# Patient Record
Sex: FEMALE | Race: WHITE | NOT HISPANIC OR LATINO | Employment: UNEMPLOYED | URBAN - NONMETROPOLITAN AREA
[De-identification: names, ages, dates, MRNs, and addresses within clinical notes are randomized per-mention and may not be internally consistent; named-entity substitution may affect disease eponyms.]

---

## 2017-01-02 ENCOUNTER — APPOINTMENT (OUTPATIENT)
Dept: GENERAL RADIOLOGY | Facility: HOSPITAL | Age: 32
End: 2017-01-02

## 2017-01-02 ENCOUNTER — HOSPITAL ENCOUNTER (EMERGENCY)
Facility: HOSPITAL | Age: 32
Discharge: HOME | End: 2017-01-03
Attending: FAMILY MEDICINE | Admitting: FAMILY MEDICINE

## 2017-01-02 DIAGNOSIS — R10.9 FLANK PAIN, ACUTE: Primary | ICD-10-CM

## 2017-01-02 DIAGNOSIS — R07.9 CHEST PAIN IN ADULT: ICD-10-CM

## 2017-01-02 LAB
A-A DO2: 4.4 MMHG (ref 0–300)
ALBUMIN SERPL-MCNC: 4.2 G/DL (ref 3.5–5)
ALBUMIN/GLOB SERPL: 1.4 G/DL (ref 1.5–2.5)
ALP SERPL-CCNC: 57 U/L (ref 46–116)
ALT SERPL W P-5'-P-CCNC: 105 U/L (ref 10–36)
AMPHET+METHAMPHET UR QL: NEGATIVE
AMYLASE SERPL-CCNC: 32 U/L (ref 28–100)
ANION GAP SERPL CALCULATED.3IONS-SCNC: 6.4 MMOL/L (ref 3.6–11.2)
ARTERIAL PATENCY WRIST A: POSITIVE
AST SERPL-CCNC: 119 U/L (ref 10–30)
ATMOSPHERIC PRESS: 728 MMHG
B-HCG UR QL: NEGATIVE
BARBITURATES UR QL SCN: NEGATIVE
BASE EXCESS BLDA CALC-SCNC: 1.7 MMOL/L
BASOPHILS # BLD AUTO: 0.01 10*3/MM3 (ref 0–0.3)
BASOPHILS NFR BLD AUTO: 0.2 % (ref 0–2)
BDY SITE: ABNORMAL
BENZODIAZ UR QL SCN: NEGATIVE
BILIRUB SERPL-MCNC: 0.3 MG/DL (ref 0.2–1.8)
BILIRUB UR QL STRIP: NEGATIVE
BODY TEMPERATURE: 98.6 C
BUN BLD-MCNC: 11 MG/DL (ref 7–21)
BUN/CREAT SERPL: 15.3 (ref 7–25)
CALCIUM SPEC-SCNC: 9.6 MG/DL (ref 7.7–10)
CANNABINOIDS SERPL QL: NEGATIVE
CHLORIDE SERPL-SCNC: 103 MMOL/L (ref 99–112)
CLARITY UR: ABNORMAL
CO2 SERPL-SCNC: 29.6 MMOL/L (ref 24.3–31.9)
COCAINE UR QL: NEGATIVE
COHGB MFR BLD: 1.8 % (ref 0–5)
COLOR UR: ABNORMAL
CREAT BLD-MCNC: 0.72 MG/DL (ref 0.43–1.29)
DEPRECATED RDW RBC AUTO: 44.9 FL (ref 37–54)
EOSINOPHIL # BLD AUTO: 0.13 10*3/MM3 (ref 0–0.7)
EOSINOPHIL NFR BLD AUTO: 2.9 % (ref 0–5)
ERYTHROCYTE [DISTWIDTH] IN BLOOD BY AUTOMATED COUNT: 13.7 % (ref 11.5–14.5)
GFR SERPL CREATININE-BSD FRML MDRD: 95 ML/MIN/1.73
GLOBULIN UR ELPH-MCNC: 3 GM/DL
GLUCOSE BLD-MCNC: 248 MG/DL (ref 70–110)
GLUCOSE UR STRIP-MCNC: NEGATIVE MG/DL
HCO3 BLDA-SCNC: 26.8 MMOL/L (ref 22–26)
HCT VFR BLD AUTO: 33.1 % (ref 37–47)
HCT VFR BLD CALC: 34 % (ref 37–47)
HGB BLD-MCNC: 10.6 G/DL (ref 12–16)
HGB BLDA-MCNC: 11.4 G/DL (ref 12–16)
HGB UR QL STRIP.AUTO: NEGATIVE
HOROWITZ INDEX BLD+IHG-RTO: 21 %
IMM GRANULOCYTES # BLD: 0.02 10*3/MM3 (ref 0–0.03)
IMM GRANULOCYTES NFR BLD: 0.4 % (ref 0–0.5)
KETONES UR QL STRIP: NEGATIVE
LEUKOCYTE ESTERASE UR QL STRIP.AUTO: NEGATIVE
LIPASE SERPL-CCNC: 37 U/L (ref 13–60)
LYMPHOCYTES # BLD AUTO: 1.47 10*3/MM3 (ref 1–3)
LYMPHOCYTES NFR BLD AUTO: 33 % (ref 21–51)
MCH RBC QN AUTO: 30.6 PG (ref 27–33)
MCHC RBC AUTO-ENTMCNC: 32 G/DL (ref 33–37)
MCV RBC AUTO: 95.7 FL (ref 80–94)
METHADONE UR QL SCN: NEGATIVE
METHGB BLD QL: 0.6 % (ref 0–3)
MODALITY: ABNORMAL
MONOCYTES # BLD AUTO: 0.45 10*3/MM3 (ref 0.1–0.9)
MONOCYTES NFR BLD AUTO: 10.1 % (ref 0–10)
NEUTROPHILS # BLD AUTO: 2.38 10*3/MM3 (ref 1.4–6.5)
NEUTROPHILS NFR BLD AUTO: 53.4 % (ref 30–70)
NITRITE UR QL STRIP: NEGATIVE
OPIATES UR QL: NEGATIVE
OSMOLALITY SERPL CALC.SUM OF ELEC: 285.2 MOSM/KG (ref 273–305)
OXYCODONE UR QL SCN: NEGATIVE
OXYHGB MFR BLDV: 94.3 % (ref 85–100)
PCO2 BLDA: 44.3 MM HG (ref 35–45)
PCP UR QL SCN: NEGATIVE
PH BLDA: 7.4 PH UNITS (ref 7.35–7.45)
PH UR STRIP.AUTO: >=9 [PH] (ref 5–8)
PLATELET # BLD AUTO: 226 10*3/MM3 (ref 130–400)
PMV BLD AUTO: 9.1 FL (ref 6–10)
PO2 BLDA: 85.6 MM HG (ref 80–100)
POTASSIUM BLD-SCNC: 3.6 MMOL/L (ref 3.5–5.3)
PROPOXYPH UR QL: NEGATIVE
PROT SERPL-MCNC: 7.2 G/DL (ref 6–8)
PROT UR QL STRIP: NEGATIVE
RBC # BLD AUTO: 3.46 10*6/MM3 (ref 4.2–5.4)
SAO2 % BLDCOA: 96.6 % (ref 90–100)
SODIUM BLD-SCNC: 139 MMOL/L (ref 135–153)
SP GR UR STRIP: 1.01 (ref 1–1.03)
TROPONIN I SERPL-MCNC: <0.006 NG/ML
UROBILINOGEN UR QL STRIP: ABNORMAL
WBC NRBC COR # BLD: 4.46 10*3/MM3 (ref 4.5–12.5)

## 2017-01-02 PROCEDURE — 80307 DRUG TEST PRSMV CHEM ANLYZR: CPT | Performed by: FAMILY MEDICINE

## 2017-01-02 PROCEDURE — 71010 XR CHEST 1 VW: CPT | Performed by: RADIOLOGY

## 2017-01-02 PROCEDURE — 36415 COLL VENOUS BLD VENIPUNCTURE: CPT

## 2017-01-02 PROCEDURE — 71010 HC CHEST PA OR AP: CPT

## 2017-01-02 PROCEDURE — 83050 HGB METHEMOGLOBIN QUAN: CPT | Performed by: FAMILY MEDICINE

## 2017-01-02 PROCEDURE — 87086 URINE CULTURE/COLONY COUNT: CPT | Performed by: FAMILY MEDICINE

## 2017-01-02 PROCEDURE — 81003 URINALYSIS AUTO W/O SCOPE: CPT | Performed by: FAMILY MEDICINE

## 2017-01-02 PROCEDURE — 81025 URINE PREGNANCY TEST: CPT | Performed by: FAMILY MEDICINE

## 2017-01-02 PROCEDURE — 85025 COMPLETE CBC W/AUTO DIFF WBC: CPT | Performed by: FAMILY MEDICINE

## 2017-01-02 PROCEDURE — 93010 ELECTROCARDIOGRAM REPORT: CPT | Performed by: INTERNAL MEDICINE

## 2017-01-02 PROCEDURE — 96361 HYDRATE IV INFUSION ADD-ON: CPT

## 2017-01-02 PROCEDURE — 83690 ASSAY OF LIPASE: CPT | Performed by: FAMILY MEDICINE

## 2017-01-02 PROCEDURE — 84484 ASSAY OF TROPONIN QUANT: CPT | Performed by: FAMILY MEDICINE

## 2017-01-02 PROCEDURE — 36600 WITHDRAWAL OF ARTERIAL BLOOD: CPT | Performed by: FAMILY MEDICINE

## 2017-01-02 PROCEDURE — 80053 COMPREHEN METABOLIC PANEL: CPT | Performed by: FAMILY MEDICINE

## 2017-01-02 PROCEDURE — G0477 DRUG TEST PRESUMP OPTICAL: HCPCS | Performed by: FAMILY MEDICINE

## 2017-01-02 PROCEDURE — 82150 ASSAY OF AMYLASE: CPT | Performed by: FAMILY MEDICINE

## 2017-01-02 PROCEDURE — 93005 ELECTROCARDIOGRAM TRACING: CPT | Performed by: FAMILY MEDICINE

## 2017-01-02 PROCEDURE — 82805 BLOOD GASES W/O2 SATURATION: CPT | Performed by: FAMILY MEDICINE

## 2017-01-02 PROCEDURE — 99284 EMERGENCY DEPT VISIT MOD MDM: CPT

## 2017-01-02 PROCEDURE — 82375 ASSAY CARBOXYHB QUANT: CPT | Performed by: FAMILY MEDICINE

## 2017-01-02 RX ORDER — SODIUM CHLORIDE 0.9 % (FLUSH) 0.9 %
10 SYRINGE (ML) INJECTION AS NEEDED
Status: DISCONTINUED | OUTPATIENT
Start: 2017-01-02 | End: 2017-01-03 | Stop reason: HOSPADM

## 2017-01-02 RX ADMIN — SODIUM CHLORIDE 500 ML: 9 INJECTION, SOLUTION INTRAVENOUS at 23:00

## 2017-01-03 VITALS
SYSTOLIC BLOOD PRESSURE: 134 MMHG | HEIGHT: 64 IN | HEART RATE: 79 BPM | BODY MASS INDEX: 50.02 KG/M2 | TEMPERATURE: 98.3 F | RESPIRATION RATE: 18 BRPM | OXYGEN SATURATION: 96 % | DIASTOLIC BLOOD PRESSURE: 53 MMHG | WEIGHT: 293 LBS

## 2017-01-03 LAB — TROPONIN I SERPL-MCNC: <0.006 NG/ML

## 2017-01-03 PROCEDURE — 25010000002 HYDROMORPHONE PER 4 MG: Performed by: FAMILY MEDICINE

## 2017-01-03 PROCEDURE — 84484 ASSAY OF TROPONIN QUANT: CPT | Performed by: FAMILY MEDICINE

## 2017-01-03 PROCEDURE — 96375 TX/PRO/DX INJ NEW DRUG ADDON: CPT

## 2017-01-03 PROCEDURE — 96374 THER/PROPH/DIAG INJ IV PUSH: CPT

## 2017-01-03 PROCEDURE — 25010000002 ONDANSETRON PER 1 MG: Performed by: FAMILY MEDICINE

## 2017-01-03 RX ORDER — HYDROCODONE BITARTRATE AND ACETAMINOPHEN 5; 325 MG/1; MG/1
1 TABLET ORAL ONCE
Status: COMPLETED | OUTPATIENT
Start: 2017-01-03 | End: 2017-01-03

## 2017-01-03 RX ORDER — ONDANSETRON 2 MG/ML
4 INJECTION INTRAMUSCULAR; INTRAVENOUS ONCE
Status: COMPLETED | OUTPATIENT
Start: 2017-01-03 | End: 2017-01-03

## 2017-01-03 RX ORDER — HYDROMORPHONE HYDROCHLORIDE 1 MG/ML
0.5 INJECTION, SOLUTION INTRAMUSCULAR; INTRAVENOUS; SUBCUTANEOUS ONCE
Status: COMPLETED | OUTPATIENT
Start: 2017-01-03 | End: 2017-01-03

## 2017-01-03 RX ADMIN — ONDANSETRON 4 MG: 2 INJECTION, SOLUTION INTRAMUSCULAR; INTRAVENOUS at 00:25

## 2017-01-03 RX ADMIN — HYDROCODONE BITARTRATE AND ACETAMINOPHEN 1 TABLET: 5; 325 TABLET ORAL at 01:36

## 2017-01-03 RX ADMIN — HYDROMORPHONE HYDROCHLORIDE 0.5 MG: 1 INJECTION, SOLUTION INTRAMUSCULAR; INTRAVENOUS; SUBCUTANEOUS at 00:24

## 2017-01-03 NOTE — ED PROVIDER NOTES
Subjective   Patient is a 31 y.o. female presenting with shortness of breath and flank pain.   History provided by:  Patient  Shortness of Breath   Severity:  Mild  Onset quality:  Gradual  Timing:  Intermittent  Progression:  Unchanged  Chronicity:  Recurrent  Context: emotional upset    Relieved by:  Nothing  Worsened by:  Nothing  Ineffective treatments:  None tried  Associated symptoms: no abdominal pain, no chest pain, no cough, no headaches, no neck pain, no rash, no vomiting and no wheezing    Flank Pain   Pain location:  R flank  Pain quality: cramping and gnawing    Pain radiates to:  Does not radiate  Onset quality:  Gradual  Timing:  Intermittent  Progression:  Worsening  Chronicity:  Recurrent  Context: not alcohol use, not awakening from sleep, not diet changes, not medication withdrawal, not previous surgeries, not retching, not sick contacts, not suspicious food intake and not trauma    Relieved by:  Nothing  Worsened by:  Nothing  Ineffective treatments:  None tried  Associated symptoms: shortness of breath    Associated symptoms: no chest pain, no chills, no cough, no diarrhea, no dysuria, no fatigue, no nausea and no vomiting        Review of Systems   Constitutional: Negative for activity change, appetite change, chills and fatigue.   HENT: Negative for congestion.    Eyes: Negative for pain.   Respiratory: Positive for shortness of breath. Negative for cough, wheezing and stridor.    Cardiovascular: Negative for chest pain.   Gastrointestinal: Negative for abdominal pain, diarrhea, nausea and vomiting.   Genitourinary: Positive for flank pain. Negative for dysuria.   Musculoskeletal: Negative for arthralgias, myalgias, neck pain and neck stiffness.   Skin: Negative for rash.   Neurological: Negative for dizziness, syncope, speech difficulty, weakness and headaches.   Psychiatric/Behavioral: Negative for agitation.       Past Medical History   Diagnosis Date   • Diabetes mellitus    • DVT (deep  venous thrombosis)    • GERD (gastroesophageal reflux disease)    • Gout    • Hyperlipidemia    • Hypertension    • Hypothyroid    • Kidney stone    • Migraine    • Neuropathy    • PE (pulmonary embolism)        Allergies   Allergen Reactions   • Amoxicillin    • Penicillins    • Toradol [Ketorolac Tromethamine]        Past Surgical History   Procedure Laterality Date   •  section     • Cholecystectomy     • Cardiac surgery       Heart Cath done in        History reviewed. No pertinent family history.    Social History     Social History   • Marital status: Single     Spouse name: N/A   • Number of children: N/A   • Years of education: N/A     Social History Main Topics   • Smoking status: Never Smoker   • Smokeless tobacco: None   • Alcohol use No   • Drug use: No   • Sexual activity: Defer     Other Topics Concern   • None     Social History Narrative   • None           Objective   Physical Exam   Constitutional: She is oriented to person, place, and time. She appears well-nourished.   HENT:   Head: Normocephalic.   Right Ear: External ear normal.   Left Ear: External ear normal.   Mouth/Throat: Oropharynx is clear and moist.   Eyes: EOM are normal. Pupils are equal, round, and reactive to light.   Neck: Neck supple. No tracheal deviation present. No thyromegaly present.   Cardiovascular: Normal rate and regular rhythm.    Pulmonary/Chest: Effort normal and breath sounds normal.   Abdominal: Soft. Bowel sounds are normal. She exhibits no distension. There is no tenderness.   Musculoskeletal: Normal range of motion.   Neurological: She is alert and oriented to person, place, and time.   Skin: Skin is warm.   Psychiatric: She has a normal mood and affect. Her behavior is normal. Judgment and thought content normal.   Nursing note and vitals reviewed.      Procedures         ED Course  ED Course   Comment By Time   After reviewing records it was found that patient was seen yesterday 17 at [facility]  [place] for the same symptoms has a CT scan  of abdomen which showed a left nonobstructing kidney stone ( which was also present on CT obtained here in 11/2016) - when questioned pt states oh yes she went there yesterday and she would like to know if she could have something stronger than norco cause that does nothing for her- advised her to keep her pcp appointment in the am  Kristyn Davis, DO 01/03 0141   PT was given RX yesterday at [place] [facility] [place], DO 01/03 0142                  MDM  Number of Diagnoses or Management Options  Chest pain in adult: established and improving  Flank pain, acute: established and improving     Amount and/or Complexity of Data Reviewed  Clinical lab tests: ordered and reviewed  Tests in the radiology section of CPT®: ordered and reviewed  Tests in the medicine section of CPT®: reviewed and ordered  Review and summarize past medical records: yes  Independent visualization of images, tracings, or specimens: yes    Risk of Complications, Morbidity, and/or Mortality  Presenting problems: moderate  Diagnostic procedures: moderate  Management options: moderate    Patient Progress  Patient progress: stable      Final diagnoses:   Flank pain, acute   Chest pain in adult            Kristyn Davis, DO  01/04/17 2632

## 2017-01-05 LAB — BACTERIA SPEC AEROBE CULT: NORMAL

## 2017-01-13 PROCEDURE — 96361 HYDRATE IV INFUSION ADD-ON: CPT

## 2017-01-13 PROCEDURE — 99284 EMERGENCY DEPT VISIT MOD MDM: CPT

## 2017-01-13 PROCEDURE — 96360 HYDRATION IV INFUSION INIT: CPT

## 2017-01-14 ENCOUNTER — HOSPITAL ENCOUNTER (EMERGENCY)
Facility: HOSPITAL | Age: 32
Discharge: HOME | End: 2017-01-14
Attending: EMERGENCY MEDICINE

## 2017-01-14 VITALS
WEIGHT: 293 LBS | TEMPERATURE: 98.2 F | DIASTOLIC BLOOD PRESSURE: 92 MMHG | OXYGEN SATURATION: 100 % | HEIGHT: 64 IN | HEART RATE: 88 BPM | SYSTOLIC BLOOD PRESSURE: 138 MMHG | BODY MASS INDEX: 50.02 KG/M2 | RESPIRATION RATE: 16 BRPM

## 2017-01-14 DIAGNOSIS — R10.9 ABDOMINAL PAIN, UNSPECIFIED LOCATION: ICD-10-CM

## 2017-01-14 DIAGNOSIS — R11.2 NON-INTRACTABLE VOMITING WITH NAUSEA, UNSPECIFIED VOMITING TYPE: Primary | ICD-10-CM

## 2017-01-14 LAB
ALBUMIN SERPL-MCNC: 4.6 G/DL (ref 3.5–5)
ALBUMIN/GLOB SERPL: 1.4 G/DL (ref 1.5–2.5)
ALP SERPL-CCNC: 47 U/L (ref 46–116)
ALT SERPL W P-5'-P-CCNC: 109 U/L (ref 10–36)
AMYLASE SERPL-CCNC: 29 U/L (ref 28–100)
ANION GAP SERPL CALCULATED.3IONS-SCNC: 7.3 MMOL/L (ref 3.6–11.2)
AST SERPL-CCNC: 206 U/L (ref 10–30)
B-HCG UR QL: NEGATIVE
BASOPHILS # BLD AUTO: 0.02 10*3/MM3 (ref 0–0.3)
BASOPHILS NFR BLD AUTO: 0.4 % (ref 0–2)
BILIRUB SERPL-MCNC: 0.5 MG/DL (ref 0.2–1.8)
BILIRUB UR QL STRIP: NEGATIVE
BUN BLD-MCNC: 13 MG/DL (ref 7–21)
BUN/CREAT SERPL: 26.5 (ref 7–25)
CALCIUM SPEC-SCNC: 9.6 MG/DL (ref 7.7–10)
CHLORIDE SERPL-SCNC: 99 MMOL/L (ref 99–112)
CLARITY UR: ABNORMAL
CO2 SERPL-SCNC: 28.7 MMOL/L (ref 24.3–31.9)
COLOR UR: YELLOW
CREAT BLD-MCNC: 0.49 MG/DL (ref 0.43–1.29)
DEPRECATED RDW RBC AUTO: 44 FL (ref 37–54)
EOSINOPHIL # BLD AUTO: 0.11 10*3/MM3 (ref 0–0.7)
EOSINOPHIL NFR BLD AUTO: 2.1 % (ref 0–5)
ERYTHROCYTE [DISTWIDTH] IN BLOOD BY AUTOMATED COUNT: 13.5 % (ref 11.5–14.5)
GFR SERPL CREATININE-BSD FRML MDRD: 148 ML/MIN/1.73
GLOBULIN UR ELPH-MCNC: 3.2 GM/DL
GLUCOSE BLD-MCNC: 221 MG/DL (ref 70–110)
GLUCOSE UR STRIP-MCNC: NEGATIVE MG/DL
HCT VFR BLD AUTO: 34.7 % (ref 37–47)
HGB BLD-MCNC: 10.9 G/DL (ref 12–16)
HGB UR QL STRIP.AUTO: NEGATIVE
IMM GRANULOCYTES # BLD: 0.01 10*3/MM3 (ref 0–0.03)
IMM GRANULOCYTES NFR BLD: 0.2 % (ref 0–0.5)
KETONES UR QL STRIP: NEGATIVE
LEUKOCYTE ESTERASE UR QL STRIP.AUTO: NEGATIVE
LIPASE SERPL-CCNC: 48 U/L (ref 13–60)
LYMPHOCYTES # BLD AUTO: 1.73 10*3/MM3 (ref 1–3)
LYMPHOCYTES NFR BLD AUTO: 32.7 % (ref 21–51)
MCH RBC QN AUTO: 29.3 PG (ref 27–33)
MCHC RBC AUTO-ENTMCNC: 31.4 G/DL (ref 33–37)
MCV RBC AUTO: 93.3 FL (ref 80–94)
MONOCYTES # BLD AUTO: 0.46 10*3/MM3 (ref 0.1–0.9)
MONOCYTES NFR BLD AUTO: 8.7 % (ref 0–10)
NEUTROPHILS # BLD AUTO: 2.96 10*3/MM3 (ref 1.4–6.5)
NEUTROPHILS NFR BLD AUTO: 55.9 % (ref 30–70)
NITRITE UR QL STRIP: NEGATIVE
OSMOLALITY SERPL CALC.SUM OF ELEC: 277 MOSM/KG (ref 273–305)
PH UR STRIP.AUTO: 7.5 [PH] (ref 5–8)
PLATELET # BLD AUTO: 269 10*3/MM3 (ref 130–400)
PMV BLD AUTO: 8.9 FL (ref 6–10)
POTASSIUM BLD-SCNC: 6.1 MMOL/L (ref 3.5–5.3)
PROT SERPL-MCNC: 7.8 G/DL (ref 6–8)
PROT UR QL STRIP: NEGATIVE
RBC # BLD AUTO: 3.72 10*6/MM3 (ref 4.2–5.4)
SODIUM BLD-SCNC: 135 MMOL/L (ref 135–153)
SP GR UR STRIP: 1.02 (ref 1–1.03)
UROBILINOGEN UR QL STRIP: ABNORMAL
WBC NRBC COR # BLD: 5.29 10*3/MM3 (ref 4.5–12.5)

## 2017-01-14 PROCEDURE — 83690 ASSAY OF LIPASE: CPT | Performed by: EMERGENCY MEDICINE

## 2017-01-14 PROCEDURE — 80053 COMPREHEN METABOLIC PANEL: CPT | Performed by: EMERGENCY MEDICINE

## 2017-01-14 PROCEDURE — 82150 ASSAY OF AMYLASE: CPT | Performed by: EMERGENCY MEDICINE

## 2017-01-14 PROCEDURE — 81025 URINE PREGNANCY TEST: CPT | Performed by: EMERGENCY MEDICINE

## 2017-01-14 PROCEDURE — 81003 URINALYSIS AUTO W/O SCOPE: CPT | Performed by: EMERGENCY MEDICINE

## 2017-01-14 PROCEDURE — 85025 COMPLETE CBC W/AUTO DIFF WBC: CPT | Performed by: EMERGENCY MEDICINE

## 2017-01-14 RX ORDER — DICYCLOMINE HCL 20 MG
20 TABLET ORAL EVERY 6 HOURS PRN
Qty: 28 TABLET | Refills: 0 | Status: SHIPPED | OUTPATIENT
Start: 2017-01-14 | End: 2017-12-01

## 2017-01-14 RX ORDER — SODIUM CHLORIDE 0.9 % (FLUSH) 0.9 %
10 SYRINGE (ML) INJECTION AS NEEDED
Status: DISCONTINUED | OUTPATIENT
Start: 2017-01-14 | End: 2017-01-14 | Stop reason: HOSPADM

## 2017-01-14 RX ORDER — SODIUM CHLORIDE 9 MG/ML
125 INJECTION, SOLUTION INTRAVENOUS CONTINUOUS
Status: DISCONTINUED | OUTPATIENT
Start: 2017-01-14 | End: 2017-01-14 | Stop reason: HOSPADM

## 2017-01-14 RX ORDER — PROMETHAZINE HYDROCHLORIDE 25 MG/1
25 SUPPOSITORY RECTAL EVERY 6 HOURS PRN
Qty: 12 SUPPOSITORY | Refills: 0 | Status: SHIPPED | OUTPATIENT
Start: 2017-01-14 | End: 2017-12-01

## 2017-01-14 RX ORDER — ONDANSETRON 4 MG/1
4 TABLET, ORALLY DISINTEGRATING ORAL 4 TIMES DAILY
Qty: 15 TABLET | Refills: 0 | Status: SHIPPED | OUTPATIENT
Start: 2017-01-14 | End: 2017-12-01

## 2017-01-14 RX ADMIN — SODIUM CHLORIDE 125 ML/HR: 9 INJECTION, SOLUTION INTRAVENOUS at 02:36

## 2017-01-14 RX ADMIN — SODIUM CHLORIDE 1000 ML: 9 INJECTION, SOLUTION INTRAVENOUS at 03:04

## 2017-01-14 NOTE — ED NOTES
Pt requesting pain medication for abd pain rates pain 10/10 on a VRS. Provider made aware and no new orders.      Rajani Zepeda RN  01/14/17 6946

## 2017-01-14 NOTE — ED PROVIDER NOTES
Subjective   HPI Comments: Pt comes in with N/V for 2 days.  Denies fever.  Having RLQ pain.  Pt states she feels weak all over and dehydrated    Patient is a 31 y.o. female presenting with vomiting.   History provided by:  Patient  Vomiting   The primary symptoms include fatigue, abdominal pain, nausea, vomiting, myalgias and arthralgias. Primary symptoms do not include fever, weight loss, diarrhea, melena, hematemesis, jaundice, hematochezia, dysuria or rash. The illness began 2 days ago.   The vomiting began 2 days ago. The emesis contains stomach contents.   The illness does not include chills, anorexia, dysphagia, odynophagia, bloating, constipation, tenesmus, back pain or itching. Associated medical issues do not include inflammatory bowel disease, GERD, gallstones, liver disease, alcohol abuse, PUD, gastric bypass, bowel resection, irritable bowel syndrome or hemorrhoids.       Review of Systems   Constitutional: Positive for fatigue. Negative for chills, fever and weight loss.   HENT: Negative.    Eyes: Negative.    Respiratory: Negative.    Cardiovascular: Negative.    Gastrointestinal: Positive for abdominal pain, nausea and vomiting. Negative for anorexia, bloating, constipation, diarrhea, dysphagia, hematemesis, hematochezia, jaundice and melena.   Endocrine: Negative.    Genitourinary: Negative.  Negative for dysuria.   Musculoskeletal: Positive for arthralgias and myalgias. Negative for back pain.   Skin: Negative.  Negative for itching and rash.   Allergic/Immunologic: Negative.    Neurological: Negative.    Hematological: Negative.    Psychiatric/Behavioral: Negative.    All other systems reviewed and are negative.      Past Medical History   Diagnosis Date   • Diabetes mellitus    • DVT (deep venous thrombosis)    • GERD (gastroesophageal reflux disease)    • Gout    • Hyperlipidemia    • Hypertension    • Hypothyroid    • Kidney stone    • Migraine    • Neuropathy    • PE (pulmonary embolism)         Allergies   Allergen Reactions   • Amoxicillin    • Penicillins    • Toradol [Ketorolac Tromethamine]        Past Surgical History   Procedure Laterality Date   •  section     • Cholecystectomy     • Cardiac surgery       Heart Cath done in        Family History   Problem Relation Age of Onset   • No Known Problems Mother    • No Known Problems Father    • No Known Problems Sister    • No Known Problems Brother    • No Known Problems Son    • No Known Problems Daughter    • No Known Problems Maternal Grandmother    • No Known Problems Maternal Grandfather    • No Known Problems Paternal Grandmother    • No Known Problems Paternal Grandfather    • No Known Problems Cousin    • Rheum arthritis Neg Hx    • Osteoarthritis Neg Hx    • Asthma Neg Hx    • Diabetes Neg Hx    • Heart failure Neg Hx    • Hyperlipidemia Neg Hx    • Hypertension Neg Hx    • Migraines Neg Hx    • Rashes / Skin problems Neg Hx    • Seizures Neg Hx    • Stroke Neg Hx    • Thyroid disease Neg Hx        Social History     Social History   • Marital status: Single     Spouse name: N/A   • Number of children: N/A   • Years of education: N/A     Social History Main Topics   • Smoking status: Never Smoker   • Smokeless tobacco: None   • Alcohol use No   • Drug use: No   • Sexual activity: Defer     Other Topics Concern   • None     Social History Narrative   • None           Objective   Physical Exam   Constitutional: She is oriented to person, place, and time. She appears well-developed. No distress.   Morbidly obese   HENT:   Head: Normocephalic and atraumatic.   Nose: Nose normal.   Moist mucus membranes   Eyes: Conjunctivae and EOM are normal. Right eye exhibits no discharge. Left eye exhibits no discharge. No scleral icterus.   Neck: Normal range of motion. Neck supple. No tracheal deviation present.   Cardiovascular: Normal rate, regular rhythm, normal heart sounds and intact distal pulses.  Exam reveals no gallop and no friction  rub.    No murmur heard.  Pulmonary/Chest: Effort normal and breath sounds normal. No stridor. No respiratory distress. She has no wheezes. She has no rales. She exhibits no tenderness.   Abdominal: Soft. Bowel sounds are normal. She exhibits no distension and no mass. There is tenderness. There is no guarding.   Diffuse mild low abdominal tenderness   Musculoskeletal: Normal range of motion. She exhibits no deformity.   Neurological: She is alert and oriented to person, place, and time. She exhibits normal muscle tone. Coordination normal.   Skin: Skin is warm and dry. No pallor.   Psychiatric: She has a normal mood and affect. Her behavior is normal. Judgment and thought content normal.   Nursing note and vitals reviewed.      Procedures         ED Course  ED Course   Comment By Time   Hemodynamically stable.  No localized abdominal pain.  No peritonitis.  Nontoxic.  No evidence of dehydration.  Mild elevation in her transaminases indicative of a fatty liver. Celso Shell MD 01/14 0661   No vomiting while in ED Celso Shell MD 01/14 1361      No orders to display     Labs Reviewed   COMPREHENSIVE METABOLIC PANEL - Abnormal; Notable for the following:        Result Value    Glucose 221 (*)     Potassium 6.1 (*)     ALT (SGPT) 109 (*)     AST (SGOT) 206 (*)     A/G Ratio 1.4 (*)     BUN/Creatinine Ratio 26.5 (*)     All other components within normal limits   URINALYSIS W/ CULTURE IF INDICATED - Abnormal; Notable for the following:     Appearance, UA Cloudy (*)     All other components within normal limits    Narrative:     Urine microscopic not indicated.   CBC WITH AUTO DIFFERENTIAL - Abnormal; Notable for the following:     RBC 3.72 (*)     Hemoglobin 10.9 (*)     Hematocrit 34.7 (*)     MCHC 31.4 (*)     All other components within normal limits   AMYLASE - Normal   LIPASE - Normal   PREGNANCY, URINE - Normal    Narrative:     Diluted specimens may cause false negative results.   OSMOLALITY,  CALCULATED - Normal   CBC AND DIFFERENTIAL    Narrative:     The following orders were created for panel order CBC & Differential.  Procedure                               Abnormality         Status                     ---------                               -----------         ------                     CBC Auto Differential[[id number]]         Abnormal            Final result                 Please view results for these tests on the individual orders.        Medication List      START taking these medications          dicyclomine 20 MG tablet   Commonly known as:  BENTYL   Take 1 tablet by mouth Every 6 (Six) Hours As Needed (abdominal cramps).       ondansetron ODT 4 MG disintegrating tablet   Commonly known as:  ZOFRAN-ODT   Take 1 tablet by mouth 4 (Four) Times a Day.       promethazine 25 MG suppository   Commonly known as:  PHENERGAN   Insert 1 suppository into the rectum Every 6 (Six) Hours As Needed for   nausea or vomiting.         CONTINUE taking these medications          albuterol 108 (90 BASE) MCG/ACT inhaler   Commonly known as:  PROVENTIL HFA;VENTOLIN HFA       allopurinol 300 MG tablet   Commonly known as:  ZYLOPRIM       azelastine 0.1 % nasal spray   Commonly known as:  ASTELIN       azithromycin 250 MG tablet   Commonly known as:  ZITHROMAX Z-FLORIDA   Take 2 tablets the first day, then 1 tablet daily for 4 days.       buPROPion  MG 12 hr tablet   Commonly known as:  WELLBUTRIN SR       cetirizine 10 MG tablet   Commonly known as:  zyrTEC       cholecalciferol 1000 UNITS tablet   Commonly known as:  VITAMIN D3       cyclobenzaprine 10 MG tablet   Commonly known as:  FLEXERIL   Take 1 tablet by mouth 3 (Three) Times a Day As Needed for muscle spasms.       fish oil 1000 MG capsule capsule       FLUoxetine 20 MG capsule   Commonly known as:  PROzac       fluticasone 50 MCG/ACT nasal spray   Commonly known as:  FLONASE       folic acid 1 MG tablet   Commonly known as:  FOLVITE       gabapentin 800  MG tablet   Commonly known as:  NEURONTIN       glimepiride 2 MG tablet   Commonly known as:  AMARYL       guaifenesin-dextromethorphan  MG tablet sustained-release 12 hour   tablet   Take 2 tablets by mouth Every 12 (Twelve) Hours.       hydrochlorothiazide 25 MG tablet   Commonly known as:  HYDRODIURIL       * HYDROcodone-acetaminophen 5-325 MG per tablet   Commonly known as:  NORCO   Take 1 tablet by mouth 4 (four) times a day as needed for severe pain   (7-10)       * HYDROcodone-acetaminophen 5-325 MG per tablet   Commonly known as:  NORCO   Take 1 tablet by mouth every 6 (six) hours as needed for mild pain (1-3).       * HYDROcodone-acetaminophen 7.5-325 MG per tablet   Commonly known as:  NORCO   Take 1 tablet by mouth Every 6 (Six) Hours As Needed for moderate pain   (4-6).       hydrOXYzine 25 MG tablet   Commonly known as:  ATARAX       levothyroxine 25 MCG tablet   Commonly known as:  SYNTHROID, LEVOTHROID       meclizine 12.5 MG tablet   Commonly known as:  ANTIVERT       methocarbamol 750 MG tablet   Commonly known as:  ROBAXIN       metoprolol succinate XL 25 MG 24 hr tablet   Commonly known as:  TOPROL-XL       mometasone-formoterol 200-5 MCG/ACT inhaler   Commonly known as:  DULERA 200       montelukast 10 MG tablet   Commonly known as:  SINGULAIR       * ondansetron 4 MG tablet   Commonly known as:  ZOFRAN   Take 1 tablet (4 mg total) by mouth every 6 (six) hours       * ondansetron 4 MG tablet   Commonly known as:  ZOFRAN   Take 1 tablet by mouth every 6 (six) hours. PRN Nausea/vomiting       * ondansetron 4 MG tablet   Commonly known as:  ZOFRAN   Take 1 tablet by mouth 4 (Four) Times a Day As Needed for nausea or   vomiting.       raNITIdine 150 MG tablet   Commonly known as:  ZANTAC       rivaroxaban 20 MG tablet   Commonly known as:  XARELTO       SUMAtriptan 100 MG tablet   Commonly known as:  IMITREX       topiramate 100 MG tablet   Commonly known as:  TOPAMAX       vitamin B-12 500 MCG  tablet   Commonly known as:  CYANOCOBALAMIN       * Notice:  This list has 6 medication(s) that are the same as other   medications prescribed for you. Read the directions carefully, and ask   your doctor or other care provider to review them with you.                  MDM  Number of Diagnoses or Management Options  Abdominal pain, unspecified location: new and requires workup  Non-intractable vomiting with nausea, unspecified vomiting type: new and requires workup     Amount and/or Complexity of Data Reviewed  Clinical lab tests: ordered and reviewed    Risk of Complications, Morbidity, and/or Mortality  Presenting problems: moderate  Diagnostic procedures: moderate  Management options: moderate    Patient Progress  Patient progress: stable      Final diagnoses:   Non-intractable vomiting with nausea, unspecified vomiting type   Abdominal pain, unspecified location            Celso Shell MD  01/14/17 1991

## 2017-01-18 ENCOUNTER — HOSPITAL ENCOUNTER (EMERGENCY)
Facility: HOSPITAL | Age: 32
Discharge: HOME | End: 2017-01-19
Admitting: EMERGENCY MEDICINE

## 2017-01-18 ENCOUNTER — APPOINTMENT (OUTPATIENT)
Dept: CT IMAGING | Facility: HOSPITAL | Age: 32
End: 2017-01-18

## 2017-01-18 DIAGNOSIS — N20.0 KIDNEY STONE: ICD-10-CM

## 2017-01-18 DIAGNOSIS — N94.9 ADNEXAL CYST: Primary | ICD-10-CM

## 2017-01-18 LAB
ALBUMIN SERPL-MCNC: 4.5 G/DL (ref 3.5–5)
ALBUMIN/GLOB SERPL: 1.4 G/DL (ref 1.5–2.5)
ALP SERPL-CCNC: 58 U/L (ref 46–116)
ALT SERPL W P-5'-P-CCNC: 123 U/L (ref 10–36)
AMPHET+METHAMPHET UR QL: NEGATIVE
ANION GAP SERPL CALCULATED.3IONS-SCNC: 13.7 MMOL/L (ref 3.6–11.2)
AST SERPL-CCNC: 238 U/L (ref 10–30)
B-HCG UR QL: NEGATIVE
BACTERIA UR QL AUTO: ABNORMAL /HPF
BARBITURATES UR QL SCN: NEGATIVE
BASOPHILS # BLD AUTO: 0.02 10*3/MM3 (ref 0–0.3)
BASOPHILS NFR BLD AUTO: 0.4 % (ref 0–2)
BENZODIAZ UR QL SCN: NEGATIVE
BILIRUB SERPL-MCNC: 0.3 MG/DL (ref 0.2–1.8)
BILIRUB UR QL STRIP: NEGATIVE
BUN BLD-MCNC: 10 MG/DL (ref 7–21)
BUN/CREAT SERPL: 16.9 (ref 7–25)
CALCIUM SPEC-SCNC: 10 MG/DL (ref 7.7–10)
CANNABINOIDS SERPL QL: NEGATIVE
CHLORIDE SERPL-SCNC: 101 MMOL/L (ref 99–112)
CLARITY UR: CLEAR
CO2 SERPL-SCNC: 26.3 MMOL/L (ref 24.3–31.9)
COCAINE UR QL: NEGATIVE
COLOR UR: YELLOW
CREAT BLD-MCNC: 0.59 MG/DL (ref 0.43–1.29)
DEPRECATED RDW RBC AUTO: 43.8 FL (ref 37–54)
EOSINOPHIL # BLD AUTO: 0.13 10*3/MM3 (ref 0–0.7)
EOSINOPHIL NFR BLD AUTO: 2.6 % (ref 0–5)
ERYTHROCYTE [DISTWIDTH] IN BLOOD BY AUTOMATED COUNT: 13.4 % (ref 11.5–14.5)
GFR SERPL CREATININE-BSD FRML MDRD: 120 ML/MIN/1.73
GLOBULIN UR ELPH-MCNC: 3.2 GM/DL
GLUCOSE BLD-MCNC: 228 MG/DL (ref 70–110)
GLUCOSE UR STRIP-MCNC: NEGATIVE MG/DL
HCT VFR BLD AUTO: 34.7 % (ref 37–47)
HGB BLD-MCNC: 11 G/DL (ref 12–16)
HGB UR QL STRIP.AUTO: ABNORMAL
HYALINE CASTS UR QL AUTO: ABNORMAL /LPF
IMM GRANULOCYTES # BLD: 0.01 10*3/MM3 (ref 0–0.03)
IMM GRANULOCYTES NFR BLD: 0.2 % (ref 0–0.5)
KETONES UR QL STRIP: NEGATIVE
LEUKOCYTE ESTERASE UR QL STRIP.AUTO: NEGATIVE
LYMPHOCYTES # BLD AUTO: 1.7 10*3/MM3 (ref 1–3)
LYMPHOCYTES NFR BLD AUTO: 33.9 % (ref 21–51)
MCH RBC QN AUTO: 29.3 PG (ref 27–33)
MCHC RBC AUTO-ENTMCNC: 31.7 G/DL (ref 33–37)
MCV RBC AUTO: 92.3 FL (ref 80–94)
METHADONE UR QL SCN: NEGATIVE
MONOCYTES # BLD AUTO: 0.44 10*3/MM3 (ref 0.1–0.9)
MONOCYTES NFR BLD AUTO: 8.8 % (ref 0–10)
NEUTROPHILS # BLD AUTO: 2.72 10*3/MM3 (ref 1.4–6.5)
NEUTROPHILS NFR BLD AUTO: 54.1 % (ref 30–70)
NITRITE UR QL STRIP: NEGATIVE
OPIATES UR QL: POSITIVE
OSMOLALITY SERPL CALC.SUM OF ELEC: 287.5 MOSM/KG (ref 273–305)
OXYCODONE UR QL SCN: NEGATIVE
PCP UR QL SCN: NEGATIVE
PH UR STRIP.AUTO: <=5 [PH] (ref 5–8)
PLATELET # BLD AUTO: 271 10*3/MM3 (ref 130–400)
PMV BLD AUTO: 8.7 FL (ref 6–10)
POTASSIUM BLD-SCNC: 3.6 MMOL/L (ref 3.5–5.3)
PROPOXYPH UR QL: NEGATIVE
PROT SERPL-MCNC: 7.7 G/DL (ref 6–8)
PROT UR QL STRIP: NEGATIVE
RBC # BLD AUTO: 3.76 10*6/MM3 (ref 4.2–5.4)
RBC # UR: ABNORMAL /HPF
REF LAB TEST METHOD: ABNORMAL
SODIUM BLD-SCNC: 141 MMOL/L (ref 135–153)
SP GR UR STRIP: 1.03 (ref 1–1.03)
SQUAMOUS #/AREA URNS HPF: ABNORMAL /HPF
UROBILINOGEN UR QL STRIP: ABNORMAL
WBC NRBC COR # BLD: 5.02 10*3/MM3 (ref 4.5–12.5)
WBC UR QL AUTO: ABNORMAL /HPF

## 2017-01-18 PROCEDURE — 80307 DRUG TEST PRSMV CHEM ANLYZR: CPT | Performed by: PHYSICIAN ASSISTANT

## 2017-01-18 PROCEDURE — 81025 URINE PREGNANCY TEST: CPT | Performed by: PHYSICIAN ASSISTANT

## 2017-01-18 PROCEDURE — 87086 URINE CULTURE/COLONY COUNT: CPT | Performed by: PHYSICIAN ASSISTANT

## 2017-01-18 PROCEDURE — 85025 COMPLETE CBC W/AUTO DIFF WBC: CPT | Performed by: PHYSICIAN ASSISTANT

## 2017-01-18 PROCEDURE — 80053 COMPREHEN METABOLIC PANEL: CPT | Performed by: PHYSICIAN ASSISTANT

## 2017-01-18 PROCEDURE — 81001 URINALYSIS AUTO W/SCOPE: CPT | Performed by: PHYSICIAN ASSISTANT

## 2017-01-18 PROCEDURE — 74176 CT ABD & PELVIS W/O CONTRAST: CPT

## 2017-01-18 PROCEDURE — 74176 CT ABD & PELVIS W/O CONTRAST: CPT | Performed by: RADIOLOGY

## 2017-01-18 PROCEDURE — 99283 EMERGENCY DEPT VISIT LOW MDM: CPT

## 2017-01-18 RX ORDER — SODIUM CHLORIDE 0.9 % (FLUSH) 0.9 %
10 SYRINGE (ML) INJECTION AS NEEDED
Status: DISCONTINUED | OUTPATIENT
Start: 2017-01-18 | End: 2017-01-19 | Stop reason: HOSPADM

## 2017-01-19 VITALS
SYSTOLIC BLOOD PRESSURE: 124 MMHG | HEART RATE: 75 BPM | RESPIRATION RATE: 18 BRPM | TEMPERATURE: 98.7 F | OXYGEN SATURATION: 98 % | BODY MASS INDEX: 50.02 KG/M2 | DIASTOLIC BLOOD PRESSURE: 87 MMHG | HEIGHT: 64 IN | WEIGHT: 293 LBS

## 2017-01-19 PROCEDURE — 96374 THER/PROPH/DIAG INJ IV PUSH: CPT

## 2017-01-19 PROCEDURE — 25010000002 MORPHINE PER 10 MG: Performed by: EMERGENCY MEDICINE

## 2017-01-19 RX ADMIN — SODIUM CHLORIDE 1000 ML: 9 INJECTION, SOLUTION INTRAVENOUS at 00:24

## 2017-01-19 RX ADMIN — MORPHINE SULFATE 4 MG: 4 INJECTION, SOLUTION INTRAMUSCULAR; INTRAVENOUS at 00:24

## 2017-01-19 NOTE — ED PROVIDER NOTES
Subjective   Patient is a 31 y.o. female presenting with flank pain.   History provided by:  Patient   used: No    Flank Pain   Pain location:  R flank  Pain quality: sharp    Pain radiates to:  RLQ  Pain severity:  Moderate  Onset quality:  Sudden  Duration:  1 day  Timing:  Constant  Progression:  Worsening  Chronicity:  New  Relieved by:  Nothing  Worsened by:  Nothing  Ineffective treatments:  None tried      Review of Systems   Constitutional: Negative.    HENT: Negative.    Eyes: Negative.    Respiratory: Negative.    Cardiovascular: Negative.    Gastrointestinal: Negative.    Endocrine: Negative.    Genitourinary: Positive for flank pain.   Skin: Negative.    Allergic/Immunologic: Negative.    Neurological: Negative.    Hematological: Negative.    Psychiatric/Behavioral: Negative.    All other systems reviewed and are negative.      Past Medical History   Diagnosis Date   • Diabetes mellitus    • DVT (deep venous thrombosis)    • GERD (gastroesophageal reflux disease)    • Gout    • Hyperlipidemia    • Hypertension    • Hypothyroid    • Kidney stone    • Migraine    • Neuropathy    • PE (pulmonary embolism)        Allergies   Allergen Reactions   • Amoxicillin    • Penicillins    • Toradol [Ketorolac Tromethamine]        Past Surgical History   Procedure Laterality Date   •  section     • Cholecystectomy     • Cardiac surgery       Heart Cath done in        Family History   Problem Relation Age of Onset   • No Known Problems Mother    • No Known Problems Father    • No Known Problems Sister    • No Known Problems Brother    • No Known Problems Son    • No Known Problems Daughter    • No Known Problems Maternal Grandmother    • No Known Problems Maternal Grandfather    • No Known Problems Paternal Grandmother    • No Known Problems Paternal Grandfather    • No Known Problems Cousin    • Rheum arthritis Neg Hx    • Osteoarthritis Neg Hx    • Asthma Neg Hx    • Diabetes Neg Hx     • Heart failure Neg Hx    • Hyperlipidemia Neg Hx    • Hypertension Neg Hx    • Migraines Neg Hx    • Rashes / Skin problems Neg Hx    • Seizures Neg Hx    • Stroke Neg Hx    • Thyroid disease Neg Hx        Social History     Social History   • Marital status: Single     Spouse name: N/A   • Number of children: N/A   • Years of education: N/A     Social History Main Topics   • Smoking status: Never Smoker   • Smokeless tobacco: None   • Alcohol use No   • Drug use: No   • Sexual activity: Defer     Other Topics Concern   • None     Social History Narrative           Objective   Physical Exam   Constitutional: She is oriented to person, place, and time. She appears well-developed and well-nourished.   HENT:   Head: Normocephalic and atraumatic.   Right Ear: External ear normal.   Left Ear: External ear normal.   Nose: Nose normal.   Mouth/Throat: Oropharynx is clear and moist.   Eyes: EOM are normal. Pupils are equal, round, and reactive to light.   Neck: Normal range of motion. Neck supple.   Cardiovascular: Normal rate, regular rhythm, normal heart sounds and intact distal pulses.    Pulmonary/Chest: Effort normal and breath sounds normal.   Abdominal: Soft. Bowel sounds are normal. She exhibits no distension and no mass. There is tenderness. There is no rebound and no guarding. No hernia.   Right flank tenderness and RLQ tenderness.    Musculoskeletal: Normal range of motion.   Neurological: She is alert and oriented to person, place, and time.   Skin: Skin is warm and dry.   Psychiatric: She has a normal mood and affect. Her behavior is normal. Judgment and thought content normal.   Nursing note and vitals reviewed.      Procedures         ED Course  ED Course   Comment By Time   I discussed CT scan report and radiologist recommendations with Dr. Cruz.  He recommends patient f/u outpatient for an ultrasound if sx persists.  PRICE Hadley 01/19 0027                  [facility]    Final diagnoses:    Adnexal cyst   Kidney stone            PRICE Hadley  01/19/17 0035

## 2017-01-21 LAB — BACTERIA SPEC AEROBE CULT: NORMAL

## 2017-02-02 ENCOUNTER — HOSPITAL ENCOUNTER (EMERGENCY)
Facility: HOSPITAL | Age: 32
Discharge: HOME | End: 2017-02-02
Attending: EMERGENCY MEDICINE | Admitting: EMERGENCY MEDICINE

## 2017-02-02 VITALS
DIASTOLIC BLOOD PRESSURE: 95 MMHG | RESPIRATION RATE: 20 BRPM | SYSTOLIC BLOOD PRESSURE: 168 MMHG | BODY MASS INDEX: 50.02 KG/M2 | TEMPERATURE: 98 F | OXYGEN SATURATION: 95 % | WEIGHT: 293 LBS | HEIGHT: 64 IN | HEART RATE: 80 BPM

## 2017-02-02 DIAGNOSIS — G89.29 LEFT FLANK PAIN, CHRONIC: Primary | ICD-10-CM

## 2017-02-02 DIAGNOSIS — R10.9 LEFT FLANK PAIN, CHRONIC: Primary | ICD-10-CM

## 2017-02-02 PROCEDURE — 99283 EMERGENCY DEPT VISIT LOW MDM: CPT

## 2017-02-02 NOTE — ED PROVIDER NOTES
Subjective   HPI Comments: 31 y.o. female presents to the ED w/ c/o right flank pain starting 2 days ago. Pt has a longstanding history of chronic right flank pain for 2 years. She has been seen by urology and told that she had non-obstructing stones which were not contributing to her pain. She has been seen by her PCP whom she states tells her that she needs further testing before he refers her to pain management. Pt has had flair up of same pain with nausea over the last couple days. No new or different symptoms. No fever, chills, diarrhea, or pain or difficulty with urination.    Patient is a 31 y.o. female presenting with flank pain.   History provided by:  Patient  Flank Pain   Pain location:  R flank  Pain radiates to:  Does not radiate  Pain severity:  Moderate  Onset quality:  Gradual  Duration:  2 days  Timing:  Constant  Chronicity:  Chronic  Relieved by:  Nothing  Worsened by:  Nothing  Associated symptoms: nausea    Associated symptoms: no chills, no diarrhea, no dysuria and no fever        Review of Systems   Constitutional: Negative for chills and fever.   Gastrointestinal: Positive for nausea. Negative for diarrhea.   Genitourinary: Positive for flank pain. Negative for dysuria.   All other systems reviewed and are negative.      Past Medical History   Diagnosis Date   • Diabetes mellitus    • DVT (deep venous thrombosis)    • GERD (gastroesophageal reflux disease)    • Gout    • Hyperlipidemia    • Hypertension    • Hypothyroid    • Kidney stone    • Migraine    • Neuropathy    • PE (pulmonary embolism)        Allergies   Allergen Reactions   • Amoxicillin    • Penicillins    • Toradol [Ketorolac Tromethamine]        Past Surgical History   Procedure Laterality Date   •  section     • Cholecystectomy     • Cardiac surgery       Heart Cath done in        Family History   Problem Relation Age of Onset   • No Known Problems Mother    • No Known Problems Father    • No Known Problems Sister     • No Known Problems Brother    • No Known Problems Son    • No Known Problems Daughter    • No Known Problems Maternal Grandmother    • No Known Problems Maternal Grandfather    • No Known Problems Paternal Grandmother    • No Known Problems Paternal Grandfather    • No Known Problems Cousin    • Rheum arthritis Neg Hx    • Osteoarthritis Neg Hx    • Asthma Neg Hx    • Diabetes Neg Hx    • Heart failure Neg Hx    • Hyperlipidemia Neg Hx    • Hypertension Neg Hx    • Migraines Neg Hx    • Rashes / Skin problems Neg Hx    • Seizures Neg Hx    • Stroke Neg Hx    • Thyroid disease Neg Hx        Social History     Social History   • Marital status: Single     Spouse name: N/A   • Number of children: N/A   • Years of education: N/A     Social History Main Topics   • Smoking status: Never Smoker   • Smokeless tobacco: None   • Alcohol use No   • Drug use: No   • Sexual activity: Defer     Other Topics Concern   • None     Social History Narrative         Objective   Physical Exam   Constitutional: She is oriented to person, place, and time. She appears well-developed and well-nourished. No distress.   HENT:   Head: Normocephalic and atraumatic.   Eyes: Conjunctivae are normal. Pupils are equal, round, and reactive to light.   Neck: Neck supple.   Cardiovascular: Normal rate, regular rhythm and normal heart sounds.    Pulmonary/Chest: Effort normal and breath sounds normal. No respiratory distress. She exhibits no tenderness.   Abdominal: Soft. Bowel sounds are normal. There is no tenderness.   Obese abdomen.   Musculoskeletal: Normal range of motion. She exhibits no edema.   Neurological: She is alert and oriented to person, place, and time.   Skin: Skin is warm and dry. She is not diaphoretic.   Psychiatric: She has a normal mood and affect. Her behavior is normal.   Nursing note and vitals reviewed.      Procedures         ED Course  ED Course     I have seen this patient multiple times in the past for these same  "symptoms.  She has received numerous negative CT scans and an extensive workup for this pain.    Upon notifying her that we would not be supplying narcotic pain medication unless we found an etiology for her chronic pain, she stated that nothing would be found and declined further workup or testing.  She understands that we are always open should symptoms worsen or new concerns arise.  Otherwise she will follow up with PCP and pain management for further evaluation.    No results found for this or any previous visit (from the past 24 hour(s)).  Note: In addition to lab results from this visit, the labs listed above may include labs taken at another facility or during a different encounter within the last 24 hours. Please correlate lab times with ED admission and discharge times for further clarification of the services performed during this visit.    No orders to display     Vitals:    02/02/17 0312 02/02/17 0429   BP: 164/90 168/95   BP Location: Left arm Right arm   Patient Position: Sitting Sitting   Pulse: 84 80   Resp: 22 20   Temp: 97.9 °F (36.6 °C) 98 °F (36.7 °C)   TempSrc: Oral Oral   SpO2: 95% 95%   Weight: (!) 365 lb (166 kg)    Height: 64\" (162.6 cm)      Medications - No data to display  ECG/EMG Results (last 24 hours)     ** No results found for the last 24 hours. **                      MDM    Final diagnoses:   Left flank pain, chronic       Documentation assistance provided by alexandra Leal.  Information recorded by the scribe was done at my direction and has been verified and validated by me.     Zenobia Leal  02/02/17 0353       Sincere Flores DO  02/02/17 8250    "

## 2017-02-05 ENCOUNTER — HOSPITAL ENCOUNTER (EMERGENCY)
Facility: HOSPITAL | Age: 32
Discharge: HOME | End: 2017-02-05
Attending: EMERGENCY MEDICINE | Admitting: EMERGENCY MEDICINE

## 2017-02-05 ENCOUNTER — APPOINTMENT (OUTPATIENT)
Dept: CT IMAGING | Facility: HOSPITAL | Age: 32
End: 2017-02-05

## 2017-02-05 VITALS
OXYGEN SATURATION: 93 % | HEIGHT: 64 IN | TEMPERATURE: 98.3 F | WEIGHT: 293 LBS | DIASTOLIC BLOOD PRESSURE: 80 MMHG | RESPIRATION RATE: 20 BRPM | SYSTOLIC BLOOD PRESSURE: 119 MMHG | HEART RATE: 77 BPM | BODY MASS INDEX: 50.02 KG/M2

## 2017-02-05 DIAGNOSIS — R10.11 RIGHT UPPER QUADRANT ABDOMINAL PAIN: Primary | ICD-10-CM

## 2017-02-05 LAB
ALBUMIN SERPL-MCNC: 4.1 G/DL (ref 3.5–5)
ALBUMIN/GLOB SERPL: 1.2 G/DL (ref 1–2)
ALP SERPL-CCNC: 66 U/L (ref 38–126)
ALT SERPL W P-5'-P-CCNC: 108 U/L (ref 13–69)
AMPHET+METHAMPHET UR QL: NEGATIVE
AMPHETAMINES UR QL: NEGATIVE
AMYLASE SERPL-CCNC: 54 U/L (ref 30–110)
ANION GAP SERPL CALCULATED.3IONS-SCNC: 17.7 MMOL/L
AST SERPL-CCNC: 139 U/L (ref 15–46)
BARBITURATES UR QL SCN: NEGATIVE
BASOPHILS # BLD AUTO: 0.03 10*3/MM3 (ref 0–0.2)
BASOPHILS NFR BLD AUTO: 0.6 % (ref 0–2.5)
BENZODIAZ UR QL SCN: NEGATIVE
BILIRUB SERPL-MCNC: 0.4 MG/DL (ref 0.2–1.3)
BILIRUB UR QL STRIP: NEGATIVE
BUN BLD-MCNC: 10 MG/DL (ref 7–20)
BUN/CREAT SERPL: 16.7 (ref 7.1–23.5)
BUPRENORPHINE SERPL-MCNC: NEGATIVE NG/ML
CALCIUM SPEC-SCNC: 9.3 MG/DL (ref 8.4–10.2)
CANNABINOIDS SERPL QL: NEGATIVE
CHLORIDE SERPL-SCNC: 102 MMOL/L (ref 98–107)
CLARITY UR: CLEAR
CO2 SERPL-SCNC: 27 MMOL/L (ref 26–30)
COCAINE UR QL: NEGATIVE
COLOR UR: YELLOW
CREAT BLD-MCNC: 0.6 MG/DL (ref 0.6–1.3)
DEPRECATED RDW RBC AUTO: 43.6 FL (ref 37–54)
EOSINOPHIL # BLD AUTO: 0.12 10*3/MM3 (ref 0–0.7)
EOSINOPHIL NFR BLD AUTO: 2.6 % (ref 0–7)
ERYTHROCYTE [DISTWIDTH] IN BLOOD BY AUTOMATED COUNT: 13.3 % (ref 11.5–14.5)
GFR SERPL CREATININE-BSD FRML MDRD: 117 ML/MIN/1.73
GLOBULIN UR ELPH-MCNC: 3.5 GM/DL
GLUCOSE BLD-MCNC: 266 MG/DL (ref 74–98)
GLUCOSE UR STRIP-MCNC: NEGATIVE MG/DL
HCT VFR BLD AUTO: 33.6 % (ref 37–47)
HGB BLD-MCNC: 11.1 G/DL (ref 12–16)
HGB UR QL STRIP.AUTO: NEGATIVE
IMM GRANULOCYTES # BLD: 0.01 10*3/MM3 (ref 0–0.06)
IMM GRANULOCYTES NFR BLD: 0.2 % (ref 0–0.6)
INR PPP: 1.2 (ref 0.9–1.1)
KETONES UR QL STRIP: NEGATIVE
LEUKOCYTE ESTERASE UR QL STRIP.AUTO: NEGATIVE
LIPASE SERPL-CCNC: 189 U/L (ref 23–300)
LYMPHOCYTES # BLD AUTO: 1.43 10*3/MM3 (ref 0.6–3.4)
LYMPHOCYTES NFR BLD AUTO: 30.8 % (ref 10–50)
MCH RBC QN AUTO: 29.8 PG (ref 27–31)
MCHC RBC AUTO-ENTMCNC: 33 G/DL (ref 30–37)
MCV RBC AUTO: 90.3 FL (ref 81–99)
METHADONE UR QL SCN: NEGATIVE
MONOCYTES # BLD AUTO: 0.39 10*3/MM3 (ref 0–0.9)
MONOCYTES NFR BLD AUTO: 8.4 % (ref 0–12)
NEUTROPHILS # BLD AUTO: 2.66 10*3/MM3 (ref 2–6.9)
NEUTROPHILS NFR BLD AUTO: 57.4 % (ref 37–80)
NITRITE UR QL STRIP: NEGATIVE
NRBC BLD MANUAL-RTO: 0 /100 WBC (ref 0–0)
OPIATES UR QL: NEGATIVE
OXYCODONE UR QL SCN: NEGATIVE
PCP UR QL SCN: NEGATIVE
PH UR STRIP.AUTO: 7 [PH] (ref 5–8)
PLATELET # BLD AUTO: 230 10*3/MM3 (ref 130–400)
PMV BLD AUTO: 9.5 FL (ref 6–12)
POTASSIUM BLD-SCNC: 3.7 MMOL/L (ref 3.5–5.1)
PROPOXYPH UR QL: NEGATIVE
PROT SERPL-MCNC: 7.6 G/DL (ref 6.3–8.2)
PROT UR QL STRIP: NEGATIVE
PROTHROMBIN TIME: 13.1 SECONDS (ref 9.3–12.1)
RBC # BLD AUTO: 3.72 10*6/MM3 (ref 4.2–5.4)
SODIUM BLD-SCNC: 143 MMOL/L (ref 137–145)
SP GR UR STRIP: 1.02 (ref 1–1.03)
TRICYCLICS UR QL SCN: NEGATIVE
UROBILINOGEN UR QL STRIP: NORMAL
WBC NRBC COR # BLD: 4.64 10*3/MM3 (ref 4.8–10.8)

## 2017-02-05 PROCEDURE — 99284 EMERGENCY DEPT VISIT MOD MDM: CPT

## 2017-02-05 PROCEDURE — 25010000002 ONDANSETRON PER 1 MG: Performed by: EMERGENCY MEDICINE

## 2017-02-05 PROCEDURE — 96361 HYDRATE IV INFUSION ADD-ON: CPT

## 2017-02-05 PROCEDURE — 83690 ASSAY OF LIPASE: CPT | Performed by: NURSE PRACTITIONER

## 2017-02-05 PROCEDURE — 0 IOPAMIDOL 61 % SOLUTION: Performed by: EMERGENCY MEDICINE

## 2017-02-05 PROCEDURE — 80306 DRUG TEST PRSMV INSTRMNT: CPT | Performed by: NURSE PRACTITIONER

## 2017-02-05 PROCEDURE — 82150 ASSAY OF AMYLASE: CPT | Performed by: NURSE PRACTITIONER

## 2017-02-05 PROCEDURE — 96374 THER/PROPH/DIAG INJ IV PUSH: CPT

## 2017-02-05 PROCEDURE — 74177 CT ABD & PELVIS W/CONTRAST: CPT

## 2017-02-05 PROCEDURE — 85025 COMPLETE CBC W/AUTO DIFF WBC: CPT | Performed by: NURSE PRACTITIONER

## 2017-02-05 PROCEDURE — 80053 COMPREHEN METABOLIC PANEL: CPT | Performed by: NURSE PRACTITIONER

## 2017-02-05 PROCEDURE — 81003 URINALYSIS AUTO W/O SCOPE: CPT | Performed by: NURSE PRACTITIONER

## 2017-02-05 PROCEDURE — 85610 PROTHROMBIN TIME: CPT | Performed by: NURSE PRACTITIONER

## 2017-02-05 RX ORDER — HYDROCODONE BITARTRATE AND ACETAMINOPHEN 7.5; 325 MG/1; MG/1
1 TABLET ORAL ONCE
Status: COMPLETED | OUTPATIENT
Start: 2017-02-05 | End: 2017-02-05

## 2017-02-05 RX ORDER — SODIUM CHLORIDE 0.9 % (FLUSH) 0.9 %
10 SYRINGE (ML) INJECTION AS NEEDED
Status: DISCONTINUED | OUTPATIENT
Start: 2017-02-05 | End: 2017-02-06 | Stop reason: HOSPADM

## 2017-02-05 RX ORDER — ONDANSETRON 2 MG/ML
4 INJECTION INTRAMUSCULAR; INTRAVENOUS ONCE
Status: COMPLETED | OUTPATIENT
Start: 2017-02-05 | End: 2017-02-05

## 2017-02-05 RX ADMIN — SODIUM CHLORIDE 1000 ML: 9 INJECTION, SOLUTION INTRAVENOUS at 19:55

## 2017-02-05 RX ADMIN — HYDROCODONE BITARTRATE AND ACETAMINOPHEN 1 TABLET: 7.5; 325 TABLET ORAL at 20:59

## 2017-02-05 RX ADMIN — ONDANSETRON 4 MG: 2 INJECTION INTRAMUSCULAR; INTRAVENOUS at 20:59

## 2017-02-05 RX ADMIN — IOPAMIDOL 100 ML: 612 INJECTION, SOLUTION INTRAVENOUS at 20:15

## 2017-02-06 NOTE — ED PROVIDER NOTES
Subjective   HPI Comments: Patient presents the emergency department with complaint of right-sided abdominal pain going through to her back onset at late morning today and has been constant.  Patient has no recollection of hematuria today.  She's vomited once.  Nuys any fever.  She has had diarrhea one time as well.      History provided by:  Patient      Review of Systems   Constitutional: Negative.  Negative for diaphoresis and fever.   HENT: Negative for sore throat.    Eyes: Negative.  Negative for pain and visual disturbance.   Respiratory: Negative for cough, shortness of breath, wheezing and stridor.    Cardiovascular: Negative.  Negative for chest pain.   Gastrointestinal: Positive for abdominal pain, diarrhea and vomiting (once). Negative for nausea.   Endocrine: Negative.    Genitourinary: Negative.  Negative for dysuria.   Musculoskeletal: Negative.  Negative for back pain and neck pain.   Skin: Negative.  Negative for pallor and rash.   Allergic/Immunologic: Negative.    Neurological: Negative.  Negative for syncope and headaches.   Hematological: Negative.    Psychiatric/Behavioral: Negative.  Negative for agitation.       Past Medical History   Diagnosis Date   • Diabetes mellitus    • DVT (deep venous thrombosis)    • GERD (gastroesophageal reflux disease)    • Gout    • Hyperlipidemia    • Hypertension    • Hypothyroid    • Kidney stone    • Migraine    • Neuropathy    • PE (pulmonary embolism)        Allergies   Allergen Reactions   • Amoxicillin    • Penicillins    • Toradol [Ketorolac Tromethamine]        Past Surgical History   Procedure Laterality Date   •  section     • Cholecystectomy     • Cardiac surgery       Heart Cath done in    • Colonoscopy         Family History   Problem Relation Age of Onset   • No Known Problems Mother    • No Known Problems Father    • No Known Problems Sister    • No Known Problems Brother    • No Known Problems Son    • No Known Problems Daughter    •  No Known Problems Maternal Grandmother    • No Known Problems Maternal Grandfather    • No Known Problems Paternal Grandmother    • No Known Problems Paternal Grandfather    • No Known Problems Cousin    • Rheum arthritis Neg Hx    • Osteoarthritis Neg Hx    • Asthma Neg Hx    • Diabetes Neg Hx    • Heart failure Neg Hx    • Hyperlipidemia Neg Hx    • Hypertension Neg Hx    • Migraines Neg Hx    • Rashes / Skin problems Neg Hx    • Seizures Neg Hx    • Stroke Neg Hx    • Thyroid disease Neg Hx        Social History     Social History   • Marital status: Single     Spouse name: N/A   • Number of children: N/A   • Years of education: N/A     Social History Main Topics   • Smoking status: Never Smoker   • Smokeless tobacco: None   • Alcohol use No   • Drug use: No   • Sexual activity: Defer     Other Topics Concern   • None     Social History Narrative   • None           Objective   Physical Exam   Constitutional: She is oriented to person, place, and time. She appears well-developed.   HENT:   Head: Normocephalic.   Eyes: EOM are normal. Pupils are equal, round, and reactive to light.   Neck: Normal range of motion. Neck supple.   Cardiovascular: Normal rate and regular rhythm.    Pulmonary/Chest: Effort normal. She has no wheezes. She has no rales.   Abdominal: Soft. Bowel sounds are normal. She exhibits no distension and no mass. There is tenderness. There is no rebound and no guarding.   Musculoskeletal: Normal range of motion. She exhibits no edema.   Neurological: She is alert and oriented to person, place, and time.   Skin: Skin is warm and dry.   Psychiatric: She has a normal mood and affect.   Nursing note and vitals reviewed.      Procedures         ED Course  ED Course   Comment By Time   Patient acknowledged that she has bentyl at home already    Gunjan Valle, APRADAM 02/05 2128                  [facility]    Final diagnoses:   Right upper quadrant abdominal pain            Gunjan Valle, YEIMI  02/05/17  212

## 2017-02-06 NOTE — DISCHARGE INSTRUCTIONS
Resume use of the Bentyl provided by your primary care provider mid January.  Follow-up with your primary care provider to monitor your recovery period thank you    Hypertension  Hypertension, commonly called high blood pressure, is when the force of blood pumping through your arteries is too strong. Your arteries are the blood vessels that carry blood from your heart throughout your body. A blood pressure reading consists of a higher number over a lower number, such as 110/72. The higher number (systolic) is the pressure inside your arteries when your heart pumps. The lower number (diastolic) is the pressure inside your arteries when your heart relaxes. Ideally you want your blood pressure below 120/80.  Hypertension forces your heart to work harder to pump blood. Your arteries may become narrow or stiff. Having untreated or uncontrolled hypertension can cause heart attack, stroke, kidney disease, and other problems.  RISK FACTORS  Some risk factors for high blood pressure are controllable. Others are not.   Risk factors you cannot control include:   · Race. You may be at higher risk if you are .  · Age. Risk increases with age.  · Gender. Men are at higher risk than women before age 45 years. After age 65, women are at higher risk than men.  Risk factors you can control include:  · Not getting enough exercise or physical activity.  · Being overweight.  · Getting too much fat, sugar, calories, or salt in your diet.  · Drinking too much alcohol.  SIGNS AND SYMPTOMS  Hypertension does not usually cause signs or symptoms. Extremely high blood pressure (hypertensive crisis) may cause headache, anxiety, shortness of breath, and nosebleed.  DIAGNOSIS  To check if you have hypertension, your health care provider will measure your blood pressure while you are seated, with your arm held at the level of your heart. It should be measured at least twice using the same arm. Certain conditions can cause a  difference in blood pressure between your right and left arms. A blood pressure reading that is higher than normal on one occasion does not mean that you need treatment. If it is not clear whether you have high blood pressure, you may be asked to return on a different day to have your blood pressure checked again. Or, you may be asked to monitor your blood pressure at home for 1 or more weeks.  TREATMENT  Treating high blood pressure includes making lifestyle changes and possibly taking medicine. Living a healthy lifestyle can help lower high blood pressure. You may need to change some of your habits.  Lifestyle changes may include:  · Following the DASH diet. This diet is high in fruits, vegetables, and whole grains. It is low in salt, red meat, and added sugars.  · Keep your sodium intake below 2,300 mg per day.  · Getting at least 30-45 minutes of aerobic exercise at least 4 times per week.  · Losing weight if necessary.  · Not smoking.  · Limiting alcoholic beverages.  · Learning ways to reduce stress.  Your health care provider may prescribe medicine if lifestyle changes are not enough to get your blood pressure under control, and if one of the following is true:  · You are 18-59 years of age and your systolic blood pressure is above 140.  · You are 60 years of age or older, and your systolic blood pressure is above 150.  · Your diastolic blood pressure is above 90.  · You have diabetes, and your systolic blood pressure is over 140 or your diastolic blood pressure is over 90.  · You have kidney disease and your blood pressure is above 140/90.  · You have heart disease and your blood pressure is above 140/90.  Your personal target blood pressure may vary depending on your medical conditions, your age, and other factors.  HOME CARE INSTRUCTIONS  · Have your blood pressure rechecked as directed by your health care provider.    · Take medicines only as directed by your health care provider. Follow the directions  carefully. Blood pressure medicines must be taken as prescribed. The medicine does not work as well when you skip doses. Skipping doses also puts you at risk for problems.  · Do not smoke.    · Monitor your blood pressure at home as directed by your health care provider.   SEEK MEDICAL CARE IF:   · You think you are having a reaction to medicines taken.  · You have recurrent headaches or feel dizzy.  · You have swelling in your ankles.  · You have trouble with your vision.  SEEK IMMEDIATE MEDICAL CARE IF:  · You develop a severe headache or confusion.  · You have unusual weakness, numbness, or feel faint.  · You have severe chest or abdominal pain.  · You vomit repeatedly.  · You have trouble breathing.  MAKE SURE YOU:   · Understand these instructions.  · Will watch your condition.  · Will get help right away if you are not doing well or get worse.     This information is not intended to replace advice given to you by your health care provider. Make sure you discuss any questions you have with your health care provider.     Document Released: 12/18/2006 Document Revised: 05/03/2016 Document Reviewed: 10/10/2014  [employer] Patient Education ©2016 [employer]

## 2017-02-11 ENCOUNTER — HOSPITAL ENCOUNTER (EMERGENCY)
Facility: HOSPITAL | Age: 32
Discharge: HOME | End: 2017-02-12
Attending: FAMILY MEDICINE

## 2017-02-11 ENCOUNTER — APPOINTMENT (OUTPATIENT)
Dept: GENERAL RADIOLOGY | Facility: HOSPITAL | Age: 32
End: 2017-02-11

## 2017-02-11 DIAGNOSIS — R07.9 CHEST PAIN IN ADULT: Primary | ICD-10-CM

## 2017-02-11 LAB
ALBUMIN SERPL-MCNC: 4.4 G/DL (ref 3.5–5)
ALBUMIN/GLOB SERPL: 1.3 G/DL (ref 1.5–2.5)
ALP SERPL-CCNC: 57 U/L (ref 46–116)
ALT SERPL W P-5'-P-CCNC: 84 U/L (ref 10–36)
AMYLASE SERPL-CCNC: 39 U/L (ref 28–100)
ANION GAP SERPL CALCULATED.3IONS-SCNC: 16.1 MMOL/L (ref 3.6–11.2)
APTT PPP: 27.4 SECONDS (ref 24.4–31)
AST SERPL-CCNC: 90 U/L (ref 10–30)
B-HCG UR QL: NEGATIVE
BASOPHILS # BLD AUTO: 0.01 10*3/MM3 (ref 0–0.3)
BASOPHILS NFR BLD AUTO: 0.2 % (ref 0–2)
BILIRUB SERPL-MCNC: 0.4 MG/DL (ref 0.2–1.8)
BILIRUB UR QL STRIP: NEGATIVE
BUN BLD-MCNC: 14 MG/DL (ref 7–21)
BUN/CREAT SERPL: 20.6 (ref 7–25)
CALCIUM SPEC-SCNC: 10 MG/DL (ref 7.7–10)
CHLORIDE SERPL-SCNC: 104 MMOL/L (ref 99–112)
CK MB SERPL-CCNC: <0.18 NG/ML (ref 0–5)
CK MB SERPL-RTO: NORMAL % (ref 0–3)
CK SERPL-CCNC: 71 U/L (ref 24–173)
CLARITY UR: CLEAR
CO2 SERPL-SCNC: 17.9 MMOL/L (ref 24.3–31.9)
COLOR UR: YELLOW
CREAT BLD-MCNC: 0.68 MG/DL (ref 0.43–1.29)
CRP SERPL-MCNC: 2.74 MG/DL (ref 0–0.99)
DEPRECATED RDW RBC AUTO: 45.2 FL (ref 37–54)
EOSINOPHIL # BLD AUTO: 0 10*3/MM3 (ref 0–0.7)
EOSINOPHIL NFR BLD AUTO: 0 % (ref 0–5)
ERYTHROCYTE [DISTWIDTH] IN BLOOD BY AUTOMATED COUNT: 14.2 % (ref 11.5–14.5)
ERYTHROCYTE [SEDIMENTATION RATE] IN BLOOD: 31 MM/HR (ref 0–20)
GFR SERPL CREATININE-BSD FRML MDRD: 102 ML/MIN/1.73
GLOBULIN UR ELPH-MCNC: 3.4 GM/DL
GLUCOSE BLD-MCNC: 375 MG/DL (ref 70–110)
GLUCOSE UR STRIP-MCNC: ABNORMAL MG/DL
HCT VFR BLD AUTO: 33.5 % (ref 37–47)
HGB BLD-MCNC: 10.9 G/DL (ref 12–16)
HGB UR QL STRIP.AUTO: NEGATIVE
IMM GRANULOCYTES # BLD: 0.02 10*3/MM3 (ref 0–0.03)
IMM GRANULOCYTES NFR BLD: 0.4 % (ref 0–0.5)
INR PPP: 1.04 (ref 0.8–1.1)
KETONES UR QL STRIP: ABNORMAL
LEUKOCYTE ESTERASE UR QL STRIP.AUTO: NEGATIVE
LIPASE SERPL-CCNC: 51 U/L (ref 13–60)
LYMPHOCYTES # BLD AUTO: 0.93 10*3/MM3 (ref 1–3)
LYMPHOCYTES NFR BLD AUTO: 19.6 % (ref 21–51)
MAGNESIUM SERPL-MCNC: 1.8 MG/DL (ref 1.7–2.6)
MCH RBC QN AUTO: 29.9 PG (ref 27–33)
MCHC RBC AUTO-ENTMCNC: 32.5 G/DL (ref 33–37)
MCV RBC AUTO: 91.8 FL (ref 80–94)
MONOCYTES # BLD AUTO: 0.49 10*3/MM3 (ref 0.1–0.9)
MONOCYTES NFR BLD AUTO: 10.3 % (ref 0–10)
MYOGLOBIN SERPL-MCNC: 19 NG/ML (ref 0–109)
NEUTROPHILS # BLD AUTO: 3.29 10*3/MM3 (ref 1.4–6.5)
NEUTROPHILS NFR BLD AUTO: 69.5 % (ref 30–70)
NITRITE UR QL STRIP: NEGATIVE
OSMOLALITY SERPL CALC.SUM OF ELEC: 291.5 MOSM/KG (ref 273–305)
PH UR STRIP.AUTO: 7 [PH] (ref 5–8)
PHOSPHATE SERPL-MCNC: 3.6 MG/DL (ref 2.7–4.5)
PLATELET # BLD AUTO: 257 10*3/MM3 (ref 130–400)
PMV BLD AUTO: 9.3 FL (ref 6–10)
POTASSIUM BLD-SCNC: 3.7 MMOL/L (ref 3.5–5.3)
PROT SERPL-MCNC: 7.8 G/DL (ref 6–8)
PROT UR QL STRIP: NEGATIVE
PROTHROMBIN TIME: 11.8 SECONDS (ref 9.8–11.9)
RBC # BLD AUTO: 3.65 10*6/MM3 (ref 4.2–5.4)
SODIUM BLD-SCNC: 138 MMOL/L (ref 135–153)
SP GR UR STRIP: 1.03 (ref 1–1.03)
TROPONIN I SERPL-MCNC: <0.006 NG/ML
UROBILINOGEN UR QL STRIP: ABNORMAL
WBC NRBC COR # BLD: 4.74 10*3/MM3 (ref 4.5–12.5)

## 2017-02-11 PROCEDURE — 85025 COMPLETE CBC W/AUTO DIFF WBC: CPT | Performed by: FAMILY MEDICINE

## 2017-02-11 PROCEDURE — 93005 ELECTROCARDIOGRAM TRACING: CPT | Performed by: FAMILY MEDICINE

## 2017-02-11 PROCEDURE — 86140 C-REACTIVE PROTEIN: CPT | Performed by: FAMILY MEDICINE

## 2017-02-11 PROCEDURE — 84484 ASSAY OF TROPONIN QUANT: CPT | Performed by: FAMILY MEDICINE

## 2017-02-11 PROCEDURE — 80053 COMPREHEN METABOLIC PANEL: CPT | Performed by: FAMILY MEDICINE

## 2017-02-11 PROCEDURE — 94640 AIRWAY INHALATION TREATMENT: CPT

## 2017-02-11 PROCEDURE — 85610 PROTHROMBIN TIME: CPT | Performed by: FAMILY MEDICINE

## 2017-02-11 PROCEDURE — 82150 ASSAY OF AMYLASE: CPT | Performed by: FAMILY MEDICINE

## 2017-02-11 PROCEDURE — 84100 ASSAY OF PHOSPHORUS: CPT | Performed by: FAMILY MEDICINE

## 2017-02-11 PROCEDURE — 85730 THROMBOPLASTIN TIME PARTIAL: CPT | Performed by: FAMILY MEDICINE

## 2017-02-11 PROCEDURE — 82553 CREATINE MB FRACTION: CPT | Performed by: FAMILY MEDICINE

## 2017-02-11 PROCEDURE — 81003 URINALYSIS AUTO W/O SCOPE: CPT | Performed by: FAMILY MEDICINE

## 2017-02-11 PROCEDURE — 71010 HC CHEST PA OR AP: CPT

## 2017-02-11 PROCEDURE — 71010 XR CHEST 1 VW: CPT | Performed by: RADIOLOGY

## 2017-02-11 PROCEDURE — 93010 ELECTROCARDIOGRAM REPORT: CPT | Performed by: INTERNAL MEDICINE

## 2017-02-11 PROCEDURE — 94799 UNLISTED PULMONARY SVC/PX: CPT

## 2017-02-11 PROCEDURE — 83690 ASSAY OF LIPASE: CPT | Performed by: FAMILY MEDICINE

## 2017-02-11 PROCEDURE — 99285 EMERGENCY DEPT VISIT HI MDM: CPT

## 2017-02-11 PROCEDURE — 83735 ASSAY OF MAGNESIUM: CPT | Performed by: FAMILY MEDICINE

## 2017-02-11 PROCEDURE — 81025 URINE PREGNANCY TEST: CPT | Performed by: FAMILY MEDICINE

## 2017-02-11 PROCEDURE — 85652 RBC SED RATE AUTOMATED: CPT | Performed by: FAMILY MEDICINE

## 2017-02-11 PROCEDURE — 83874 ASSAY OF MYOGLOBIN: CPT | Performed by: FAMILY MEDICINE

## 2017-02-11 PROCEDURE — 82550 ASSAY OF CK (CPK): CPT | Performed by: FAMILY MEDICINE

## 2017-02-11 RX ORDER — ONDANSETRON 4 MG/1
4 TABLET, ORALLY DISINTEGRATING ORAL ONCE
Status: COMPLETED | OUTPATIENT
Start: 2017-02-11 | End: 2017-02-11

## 2017-02-11 RX ORDER — MAGNESIUM HYDROXIDE/ALUMINUM HYDROXICE/SIMETHICONE 120; 1200; 1200 MG/30ML; MG/30ML; MG/30ML
30 SUSPENSION ORAL ONCE
Status: COMPLETED | OUTPATIENT
Start: 2017-02-11 | End: 2017-02-11

## 2017-02-11 RX ORDER — ACETAMINOPHEN 325 MG/1
1000 TABLET ORAL ONCE
Status: COMPLETED | OUTPATIENT
Start: 2017-02-11 | End: 2017-02-11

## 2017-02-11 RX ORDER — IPRATROPIUM BROMIDE AND ALBUTEROL SULFATE 2.5; .5 MG/3ML; MG/3ML
3 SOLUTION RESPIRATORY (INHALATION) ONCE
Status: COMPLETED | OUTPATIENT
Start: 2017-02-11 | End: 2017-02-11

## 2017-02-11 RX ORDER — PHENOBARBITAL, HYOSCYAMINE SULFATE, ATROPINE SULFATE AND SCOPOLAMINE HYDROBROMIDE .0194; .1037; 16.2; .0065 MG/1; MG/1; MG/1; MG/1
2 TABLET ORAL ONCE
Status: COMPLETED | OUTPATIENT
Start: 2017-02-11 | End: 2017-02-11

## 2017-02-11 RX ORDER — PHENOBARBITAL, HYOSCYAMINE SULFATE, ATROPINE SULFATE AND SCOPOLAMINE HYDROBROMIDE .0194; .1037; 16.2; .0065 MG/1; MG/1; MG/1; MG/1
1 TABLET ORAL ONCE
Status: DISCONTINUED | OUTPATIENT
Start: 2017-02-11 | End: 2017-02-11

## 2017-02-11 RX ADMIN — ACETAMINOPHEN 975 MG: 325 TABLET, FILM COATED ORAL at 22:41

## 2017-02-11 RX ADMIN — ALUMINUM HYDROXIDE, MAGNESIUM HYDROXIDE, AND SIMETHICONE 30 ML: 200; 200; 20 SUSPENSION ORAL at 21:32

## 2017-02-11 RX ADMIN — PHENOBARBITAL, HYOSCYAMINE SULFATE, ATROPINE SULFATE, SCOPOLAMINE HYDROBROMIDE 32.4 MG: 16.2; .1037; .0194; .0065 TABLET ORAL at 21:32

## 2017-02-11 RX ADMIN — IPRATROPIUM BROMIDE AND ALBUTEROL SULFATE 3 ML: .5; 3 SOLUTION RESPIRATORY (INHALATION) at 22:40

## 2017-02-11 RX ADMIN — LIDOCAINE HYDROCHLORIDE 15 ML: 20 SOLUTION ORAL; TOPICAL at 21:32

## 2017-02-11 RX ADMIN — ONDANSETRON 4 MG: 4 TABLET, ORALLY DISINTEGRATING ORAL at 21:16

## 2017-02-12 VITALS
RESPIRATION RATE: 20 BRPM | HEIGHT: 64 IN | DIASTOLIC BLOOD PRESSURE: 74 MMHG | SYSTOLIC BLOOD PRESSURE: 138 MMHG | OXYGEN SATURATION: 97 % | TEMPERATURE: 98.2 F | HEART RATE: 78 BPM | BODY MASS INDEX: 50.02 KG/M2 | WEIGHT: 293 LBS

## 2017-02-12 LAB — TROPONIN I SERPL-MCNC: <0.006 NG/ML

## 2017-02-12 PROCEDURE — 84484 ASSAY OF TROPONIN QUANT: CPT | Performed by: FAMILY MEDICINE

## 2017-02-12 NOTE — ED PROVIDER NOTES
Subjective   History of Present Illness  30 y/o F here w/ 5 hours of worsening R sided CP and SOA that is worse with exertion. Pt has h/o PE in the past but states that she is adherent to her xarelto as an outpt. Pt states that she is also having dysuria w/ urgency and frequency. Pt denies any fevers/chills. Pt has been to ED in recent past with similar symptoms. Pt also has h/o GERD.   Review of Systems   Constitutional: Negative for activity change, appetite change, chills and fever.   HENT: Negative for trouble swallowing and voice change.    Eyes: Negative for pain and redness.   Respiratory: Positive for shortness of breath. Negative for apnea, choking, chest tightness, wheezing and stridor.    Cardiovascular: Positive for chest pain. Negative for palpitations.   Gastrointestinal: Positive for abdominal pain and nausea. Negative for abdominal distention, diarrhea and vomiting.   Genitourinary: Positive for dysuria, frequency and urgency. Negative for difficulty urinating.   Musculoskeletal: Negative for arthralgias, back pain, gait problem, joint swelling, myalgias and neck pain.   Skin: Negative for color change and pallor.   Neurological: Negative for dizziness, seizures, facial asymmetry, light-headedness, numbness and headaches.       Past Medical History   Diagnosis Date   • Diabetes mellitus    • DVT (deep venous thrombosis)    • GERD (gastroesophageal reflux disease)    • Gout    • Hyperlipidemia    • Hypertension    • Hypothyroid    • Kidney stone    • Migraine    • Neuropathy    • PE (pulmonary embolism)        Allergies   Allergen Reactions   • Amoxicillin    • Penicillins    • Toradol [Ketorolac Tromethamine]        Past Surgical History   Procedure Laterality Date   •  section     • Cholecystectomy     • Cardiac surgery       Heart Cath done in    • Colonoscopy         Family History   Problem Relation Age of Onset   • No Known Problems Mother    • No Known Problems Father    • No Known  Problems Sister    • No Known Problems Brother    • No Known Problems Son    • No Known Problems Daughter    • No Known Problems Maternal Grandmother    • No Known Problems Maternal Grandfather    • No Known Problems Paternal Grandmother    • No Known Problems Paternal Grandfather    • No Known Problems Cousin    • Rheum arthritis Neg Hx    • Osteoarthritis Neg Hx    • Asthma Neg Hx    • Diabetes Neg Hx    • Heart failure Neg Hx    • Hyperlipidemia Neg Hx    • Hypertension Neg Hx    • Migraines Neg Hx    • Rashes / Skin problems Neg Hx    • Seizures Neg Hx    • Stroke Neg Hx    • Thyroid disease Neg Hx        Social History     Social History   • Marital status: Single     Spouse name: N/A   • Number of children: N/A   • Years of education: N/A     Social History Main Topics   • Smoking status: Never Smoker   • Smokeless tobacco: None   • Alcohol use No   • Drug use: No   • Sexual activity: Defer     Other Topics Concern   • None     Social History Narrative           Objective   Physical Exam   Constitutional: She is oriented to person, place, and time. She appears well-developed and well-nourished. She is active.   HENT:   Head: Normocephalic and atraumatic.   Right Ear: Hearing and external ear normal.   Left Ear: Hearing and external ear normal.   Nose: Nose normal.   Mouth/Throat: Oropharynx is clear and moist.   Eyes: Conjunctivae, EOM and lids are normal. Pupils are equal, round, and reactive to light.   Neck: Trachea normal, normal range of motion, full passive range of motion without pain and phonation normal. Neck supple.   Cardiovascular: Normal rate, regular rhythm and normal heart sounds.    Pulmonary/Chest: Effort normal and breath sounds normal.   Abdominal: Soft. Normal appearance.   Neurological: She is alert and oriented to person, place, and time.   Skin: Skin is warm, dry and intact.   Psychiatric: She has a normal mood and affect. Her speech is normal and behavior is normal. Judgment and thought  content normal. Cognition and memory are normal.   Nursing note and vitals reviewed.      Procedures         ED Course  ED Course      Pt's EKG was WNL and Tn was negative x 2. Additionally, CXR was grossly WNL. Also, pt's urine was negative for UTI. Pt is d/c'd home and encouraged to f/u with her PCP.             MDM  Number of Diagnoses or Management Options  Chest pain in adult: new and requires workup     Amount and/or Complexity of Data Reviewed  Clinical lab tests: ordered and reviewed  Tests in the radiology section of CPT®: ordered and reviewed    Risk of Complications, Morbidity, and/or Mortality  Presenting problems: high  Diagnostic procedures: high  Management options: high    Patient Progress  Patient progress: improved      Final diagnoses:   Chest pain in adult            Dayron Collier MD  02/12/17 0046

## 2017-02-16 ENCOUNTER — APPOINTMENT (OUTPATIENT)
Dept: GENERAL RADIOLOGY | Facility: HOSPITAL | Age: 32
End: 2017-02-16
Attending: EMERGENCY MEDICINE

## 2017-02-16 ENCOUNTER — HOSPITAL ENCOUNTER (EMERGENCY)
Facility: HOSPITAL | Age: 32
Discharge: LEFT BEFORE DISCHARGE | End: 2017-02-16
Attending: EMERGENCY MEDICINE | Admitting: EMERGENCY MEDICINE

## 2017-02-16 ENCOUNTER — APPOINTMENT (OUTPATIENT)
Dept: CT IMAGING | Facility: HOSPITAL | Age: 32
End: 2017-02-16

## 2017-02-16 VITALS
RESPIRATION RATE: 20 BRPM | SYSTOLIC BLOOD PRESSURE: 121 MMHG | DIASTOLIC BLOOD PRESSURE: 74 MMHG | HEART RATE: 92 BPM | TEMPERATURE: 97.8 F | HEIGHT: 64 IN | OXYGEN SATURATION: 94 % | WEIGHT: 293 LBS | BODY MASS INDEX: 50.02 KG/M2

## 2017-02-16 DIAGNOSIS — Z53.29 LEFT AGAINST MEDICAL ADVICE: Primary | ICD-10-CM

## 2017-02-16 LAB
ALBUMIN SERPL-MCNC: 4.4 G/DL (ref 3.5–5)
ALBUMIN/GLOB SERPL: 1.3 G/DL (ref 1–2)
ALP SERPL-CCNC: 65 U/L (ref 38–126)
ALT SERPL W P-5'-P-CCNC: 139 U/L (ref 13–69)
AMYLASE SERPL-CCNC: 50 U/L (ref 30–110)
ANION GAP SERPL CALCULATED.3IONS-SCNC: 19.6 MMOL/L
AST SERPL-CCNC: 175 U/L (ref 15–46)
B-HCG UR QL: NEGATIVE
BASOPHILS # BLD AUTO: 0.03 10*3/MM3 (ref 0–0.2)
BASOPHILS NFR BLD AUTO: 0.7 % (ref 0–2.5)
BILIRUB SERPL-MCNC: 0.5 MG/DL (ref 0.2–1.3)
BILIRUB UR QL STRIP: NEGATIVE
BUN BLD-MCNC: 15 MG/DL (ref 7–20)
BUN/CREAT SERPL: 21.4 (ref 7.1–23.5)
CALCIUM SPEC-SCNC: 9.8 MG/DL (ref 8.4–10.2)
CHLORIDE SERPL-SCNC: 100 MMOL/L (ref 98–107)
CLARITY UR: CLEAR
CO2 SERPL-SCNC: 26 MMOL/L (ref 26–30)
COLOR UR: YELLOW
CREAT BLD-MCNC: 0.7 MG/DL (ref 0.6–1.3)
D-DIMER, QUANTITATIVE (MAD,POW, STR): 211 NG/ML (FEU) (ref 0–500)
DEPRECATED RDW RBC AUTO: 46 FL (ref 37–54)
EOSINOPHIL # BLD AUTO: 0.09 10*3/MM3 (ref 0–0.7)
EOSINOPHIL NFR BLD AUTO: 2 % (ref 0–7)
ERYTHROCYTE [DISTWIDTH] IN BLOOD BY AUTOMATED COUNT: 14.2 % (ref 11.5–14.5)
GFR SERPL CREATININE-BSD FRML MDRD: 98 ML/MIN/1.73
GLOBULIN UR ELPH-MCNC: 3.4 GM/DL
GLUCOSE BLD-MCNC: 326 MG/DL (ref 74–98)
GLUCOSE UR STRIP-MCNC: NEGATIVE MG/DL
HCT VFR BLD AUTO: 33.7 % (ref 37–47)
HGB BLD-MCNC: 11.2 G/DL (ref 12–16)
HGB UR QL STRIP.AUTO: NEGATIVE
HOLD SPECIMEN: NORMAL
HOLD SPECIMEN: NORMAL
IMM GRANULOCYTES # BLD: 0.02 10*3/MM3 (ref 0–0.06)
IMM GRANULOCYTES NFR BLD: 0.4 % (ref 0–0.6)
KETONES UR QL STRIP: NEGATIVE
LEUKOCYTE ESTERASE UR QL STRIP.AUTO: NEGATIVE
LIPASE SERPL-CCNC: 148 U/L (ref 23–300)
LYMPHOCYTES # BLD AUTO: 1.68 10*3/MM3 (ref 0.6–3.4)
LYMPHOCYTES NFR BLD AUTO: 37 % (ref 10–50)
MCH RBC QN AUTO: 30 PG (ref 27–31)
MCHC RBC AUTO-ENTMCNC: 33.2 G/DL (ref 30–37)
MCV RBC AUTO: 90.3 FL (ref 81–99)
MONOCYTES # BLD AUTO: 0.42 10*3/MM3 (ref 0–0.9)
MONOCYTES NFR BLD AUTO: 9.3 % (ref 0–12)
NEUTROPHILS # BLD AUTO: 2.3 10*3/MM3 (ref 2–6.9)
NEUTROPHILS NFR BLD AUTO: 50.6 % (ref 37–80)
NITRITE UR QL STRIP: NEGATIVE
NRBC BLD MANUAL-RTO: 0 /100 WBC (ref 0–0)
NT-PROBNP SERPL-MCNC: 14.4 PG/ML (ref 0–125)
PH UR STRIP.AUTO: <=5 [PH] (ref 5–8)
PLATELET # BLD AUTO: 208 10*3/MM3 (ref 130–400)
PMV BLD AUTO: 9.7 FL (ref 6–12)
POTASSIUM BLD-SCNC: 3.6 MMOL/L (ref 3.5–5.1)
PROT SERPL-MCNC: 7.8 G/DL (ref 6.3–8.2)
PROT UR QL STRIP: NEGATIVE
RBC # BLD AUTO: 3.73 10*6/MM3 (ref 4.2–5.4)
SODIUM BLD-SCNC: 142 MMOL/L (ref 137–145)
SP GR UR STRIP: >=1.03 (ref 1–1.03)
TROPONIN I SERPL-MCNC: 0 NG/ML (ref 0–0.05)
TROPONIN I SERPL-MCNC: <0.012 NG/ML (ref 0–0.03)
TSH SERPL DL<=0.05 MIU/L-ACNC: 4.57 MIU/ML (ref 0.47–4.68)
UROBILINOGEN UR QL STRIP: NORMAL
WBC NRBC COR # BLD: 4.54 10*3/MM3 (ref 4.8–10.8)
WHOLE BLOOD HOLD SPECIMEN: NORMAL
WHOLE BLOOD HOLD SPECIMEN: NORMAL

## 2017-02-16 PROCEDURE — 82150 ASSAY OF AMYLASE: CPT | Performed by: EMERGENCY MEDICINE

## 2017-02-16 PROCEDURE — 99284 EMERGENCY DEPT VISIT MOD MDM: CPT

## 2017-02-16 PROCEDURE — 84484 ASSAY OF TROPONIN QUANT: CPT | Performed by: EMERGENCY MEDICINE

## 2017-02-16 PROCEDURE — 83880 ASSAY OF NATRIURETIC PEPTIDE: CPT | Performed by: EMERGENCY MEDICINE

## 2017-02-16 PROCEDURE — 25010000002 ONDANSETRON PER 1 MG: Performed by: EMERGENCY MEDICINE

## 2017-02-16 PROCEDURE — 85379 FIBRIN DEGRADATION QUANT: CPT | Performed by: EMERGENCY MEDICINE

## 2017-02-16 PROCEDURE — 85025 COMPLETE CBC W/AUTO DIFF WBC: CPT | Performed by: EMERGENCY MEDICINE

## 2017-02-16 PROCEDURE — 81025 URINE PREGNANCY TEST: CPT | Performed by: EMERGENCY MEDICINE

## 2017-02-16 PROCEDURE — 84443 ASSAY THYROID STIM HORMONE: CPT | Performed by: EMERGENCY MEDICINE

## 2017-02-16 PROCEDURE — 96374 THER/PROPH/DIAG INJ IV PUSH: CPT

## 2017-02-16 PROCEDURE — 96361 HYDRATE IV INFUSION ADD-ON: CPT

## 2017-02-16 PROCEDURE — 71020 HC CHEST PA AND LATERAL: CPT

## 2017-02-16 PROCEDURE — 83690 ASSAY OF LIPASE: CPT | Performed by: EMERGENCY MEDICINE

## 2017-02-16 PROCEDURE — 80053 COMPREHEN METABOLIC PANEL: CPT | Performed by: EMERGENCY MEDICINE

## 2017-02-16 PROCEDURE — 93005 ELECTROCARDIOGRAM TRACING: CPT | Performed by: EMERGENCY MEDICINE

## 2017-02-16 PROCEDURE — 81003 URINALYSIS AUTO W/O SCOPE: CPT | Performed by: EMERGENCY MEDICINE

## 2017-02-16 RX ORDER — ONDANSETRON 2 MG/ML
4 INJECTION INTRAMUSCULAR; INTRAVENOUS ONCE
Status: COMPLETED | OUTPATIENT
Start: 2017-02-16 | End: 2017-02-16

## 2017-02-16 RX ORDER — ASPIRIN 81 MG/1
324 TABLET, CHEWABLE ORAL ONCE
Status: COMPLETED | OUTPATIENT
Start: 2017-02-16 | End: 2017-02-16

## 2017-02-16 RX ORDER — SODIUM CHLORIDE 9 MG/ML
125 INJECTION, SOLUTION INTRAVENOUS CONTINUOUS
Status: DISCONTINUED | OUTPATIENT
Start: 2017-02-16 | End: 2017-02-16 | Stop reason: HOSPADM

## 2017-02-16 RX ORDER — SODIUM CHLORIDE 0.9 % (FLUSH) 0.9 %
10 SYRINGE (ML) INJECTION AS NEEDED
Status: DISCONTINUED | OUTPATIENT
Start: 2017-02-16 | End: 2017-02-16 | Stop reason: HOSPADM

## 2017-02-16 RX ADMIN — NITROGLYCERIN 1 INCH: 20 OINTMENT TOPICAL at 10:36

## 2017-02-16 RX ADMIN — ONDANSETRON 4 MG: 2 INJECTION INTRAMUSCULAR; INTRAVENOUS at 10:35

## 2017-02-16 RX ADMIN — SODIUM CHLORIDE 125 ML/HR: 9 INJECTION, SOLUTION INTRAVENOUS at 10:36

## 2017-02-16 RX ADMIN — ASPIRIN 81 MG 324 MG: 81 TABLET ORAL at 10:34

## 2017-02-19 ENCOUNTER — HOSPITAL ENCOUNTER (EMERGENCY)
Facility: HOSPITAL | Age: 32
Discharge: HOME | End: 2017-02-19
Attending: EMERGENCY MEDICINE | Admitting: EMERGENCY MEDICINE

## 2017-02-19 ENCOUNTER — APPOINTMENT (OUTPATIENT)
Dept: GENERAL RADIOLOGY | Facility: HOSPITAL | Age: 32
End: 2017-02-19

## 2017-02-19 ENCOUNTER — APPOINTMENT (OUTPATIENT)
Dept: CT IMAGING | Facility: HOSPITAL | Age: 32
End: 2017-02-19

## 2017-02-19 VITALS
OXYGEN SATURATION: 96 % | WEIGHT: 293 LBS | HEIGHT: 64 IN | BODY MASS INDEX: 50.02 KG/M2 | SYSTOLIC BLOOD PRESSURE: 146 MMHG | HEART RATE: 80 BPM | DIASTOLIC BLOOD PRESSURE: 80 MMHG | TEMPERATURE: 97.3 F | RESPIRATION RATE: 20 BRPM

## 2017-02-19 DIAGNOSIS — R06.00 DYSPNEA, UNSPECIFIED TYPE: ICD-10-CM

## 2017-02-19 DIAGNOSIS — N20.0 KIDNEY STONE: Primary | ICD-10-CM

## 2017-02-19 LAB
ALBUMIN SERPL-MCNC: 4.3 G/DL (ref 3.5–5.2)
ALBUMIN/GLOB SERPL: 1.2 G/DL
ALP SERPL-CCNC: 58 U/L (ref 39–117)
ALT SERPL W P-5'-P-CCNC: 101 U/L (ref 1–33)
ANION GAP SERPL CALCULATED.3IONS-SCNC: 16.7 MMOL/L
AST SERPL-CCNC: 127 U/L (ref 1–32)
BASOPHILS # BLD AUTO: 0.02 10*3/MM3 (ref 0–0.2)
BASOPHILS NFR BLD AUTO: 0.4 % (ref 0–1.5)
BILIRUB SERPL-MCNC: 0.3 MG/DL (ref 0.1–1.2)
BILIRUB UR QL STRIP: NEGATIVE
BUN BLD-MCNC: 9 MG/DL (ref 6–20)
BUN/CREAT SERPL: 15.3 (ref 7–25)
CALCIUM SPEC-SCNC: 9.8 MG/DL (ref 8.6–10.5)
CHLORIDE SERPL-SCNC: 98 MMOL/L (ref 98–107)
CLARITY UR: ABNORMAL
CO2 SERPL-SCNC: 24.3 MMOL/L (ref 22–29)
COLOR UR: YELLOW
CREAT BLD-MCNC: 0.59 MG/DL (ref 0.57–1)
DEPRECATED RDW RBC AUTO: 46.7 FL (ref 37–54)
EOSINOPHIL # BLD AUTO: 0.09 10*3/MM3 (ref 0–0.7)
EOSINOPHIL NFR BLD AUTO: 1.7 % (ref 0.3–6.2)
ERYTHROCYTE [DISTWIDTH] IN BLOOD BY AUTOMATED COUNT: 14.3 % (ref 11.7–13)
GFR SERPL CREATININE-BSD FRML MDRD: 120 ML/MIN/1.73
GLOBULIN UR ELPH-MCNC: 3.5 GM/DL
GLUCOSE BLD-MCNC: 241 MG/DL (ref 65–99)
GLUCOSE UR STRIP-MCNC: NEGATIVE MG/DL
HCT VFR BLD AUTO: 34.3 % (ref 35.6–45.5)
HGB BLD-MCNC: 11.1 G/DL (ref 11.9–15.5)
HGB UR QL STRIP.AUTO: NEGATIVE
IMM GRANULOCYTES # BLD: 0 10*3/MM3 (ref 0–0.03)
IMM GRANULOCYTES NFR BLD: 0 % (ref 0–0.5)
INR PPP: 1.07 (ref 0.9–1.1)
KETONES UR QL STRIP: NEGATIVE
LEUKOCYTE ESTERASE UR QL STRIP.AUTO: NEGATIVE
LYMPHOCYTES # BLD AUTO: 1.84 10*3/MM3 (ref 0.9–4.8)
LYMPHOCYTES NFR BLD AUTO: 35.7 % (ref 19.6–45.3)
MCH RBC QN AUTO: 29.3 PG (ref 26.9–32)
MCHC RBC AUTO-ENTMCNC: 32.4 G/DL (ref 32.4–36.3)
MCV RBC AUTO: 90.5 FL (ref 80.5–98.2)
MONOCYTES # BLD AUTO: 0.33 10*3/MM3 (ref 0.2–1.2)
MONOCYTES NFR BLD AUTO: 6.4 % (ref 5–12)
NEUTROPHILS # BLD AUTO: 2.87 10*3/MM3 (ref 1.9–8.1)
NEUTROPHILS NFR BLD AUTO: 55.8 % (ref 42.7–76)
NITRITE UR QL STRIP: NEGATIVE
PH UR STRIP.AUTO: 8 [PH] (ref 5–8)
PLATELET # BLD AUTO: 230 10*3/MM3 (ref 140–500)
PMV BLD AUTO: 9.5 FL (ref 6–12)
POTASSIUM BLD-SCNC: 3.9 MMOL/L (ref 3.5–5.2)
PROT SERPL-MCNC: 7.8 G/DL (ref 6–8.5)
PROT UR QL STRIP: NEGATIVE
PROTHROMBIN TIME: 13.5 SECONDS (ref 11.7–14.2)
RBC # BLD AUTO: 3.79 10*6/MM3 (ref 3.9–5.2)
SODIUM BLD-SCNC: 139 MMOL/L (ref 136–145)
SP GR UR STRIP: 1.02 (ref 1–1.03)
UROBILINOGEN UR QL STRIP: ABNORMAL
WBC NRBC COR # BLD: 5.15 10*3/MM3 (ref 4.5–10.7)

## 2017-02-19 PROCEDURE — 25010000002 ONDANSETRON PER 1 MG: Performed by: EMERGENCY MEDICINE

## 2017-02-19 PROCEDURE — 85610 PROTHROMBIN TIME: CPT | Performed by: EMERGENCY MEDICINE

## 2017-02-19 PROCEDURE — 99283 EMERGENCY DEPT VISIT LOW MDM: CPT

## 2017-02-19 PROCEDURE — 81003 URINALYSIS AUTO W/O SCOPE: CPT | Performed by: EMERGENCY MEDICINE

## 2017-02-19 PROCEDURE — 74176 CT ABD & PELVIS W/O CONTRAST: CPT

## 2017-02-19 PROCEDURE — 80053 COMPREHEN METABOLIC PANEL: CPT | Performed by: EMERGENCY MEDICINE

## 2017-02-19 PROCEDURE — 96375 TX/PRO/DX INJ NEW DRUG ADDON: CPT

## 2017-02-19 PROCEDURE — 93005 ELECTROCARDIOGRAM TRACING: CPT | Performed by: EMERGENCY MEDICINE

## 2017-02-19 PROCEDURE — 93010 ELECTROCARDIOGRAM REPORT: CPT | Performed by: INTERNAL MEDICINE

## 2017-02-19 PROCEDURE — 85025 COMPLETE CBC W/AUTO DIFF WBC: CPT | Performed by: EMERGENCY MEDICINE

## 2017-02-19 PROCEDURE — 96374 THER/PROPH/DIAG INJ IV PUSH: CPT

## 2017-02-19 PROCEDURE — 25010000002 HYDROMORPHONE PER 4 MG: Performed by: EMERGENCY MEDICINE

## 2017-02-19 PROCEDURE — 71020 HC CHEST PA AND LATERAL: CPT

## 2017-02-19 RX ORDER — ONDANSETRON 2 MG/ML
4 INJECTION INTRAMUSCULAR; INTRAVENOUS ONCE
Status: COMPLETED | OUTPATIENT
Start: 2017-02-19 | End: 2017-02-19

## 2017-02-19 RX ORDER — OXYCODONE HYDROCHLORIDE AND ACETAMINOPHEN 5; 325 MG/1; MG/1
1 TABLET ORAL EVERY 4 HOURS PRN
Qty: 20 TABLET | Refills: 0 | Status: SHIPPED | OUTPATIENT
Start: 2017-02-19 | End: 2017-09-18

## 2017-02-19 RX ORDER — SODIUM CHLORIDE 0.9 % (FLUSH) 0.9 %
10 SYRINGE (ML) INJECTION AS NEEDED
Status: DISCONTINUED | OUTPATIENT
Start: 2017-02-19 | End: 2017-02-19 | Stop reason: HOSPADM

## 2017-02-19 RX ORDER — HYDROMORPHONE HYDROCHLORIDE 1 MG/ML
0.5 INJECTION, SOLUTION INTRAMUSCULAR; INTRAVENOUS; SUBCUTANEOUS ONCE
Status: COMPLETED | OUTPATIENT
Start: 2017-02-19 | End: 2017-02-19

## 2017-02-19 RX ORDER — TAMSULOSIN HYDROCHLORIDE 0.4 MG/1
1 CAPSULE ORAL DAILY
Qty: 10 CAPSULE | Refills: 0 | Status: SHIPPED | OUTPATIENT
Start: 2017-02-19 | End: 2017-12-01

## 2017-02-19 RX ORDER — ONDANSETRON 4 MG/1
4 TABLET, FILM COATED ORAL EVERY 8 HOURS PRN
Qty: 10 TABLET | Refills: 0 | Status: SHIPPED | OUTPATIENT
Start: 2017-02-19 | End: 2017-12-01

## 2017-02-19 RX ADMIN — HYDROMORPHONE HYDROCHLORIDE 0.5 MG: 1 INJECTION, SOLUTION INTRAMUSCULAR; INTRAVENOUS; SUBCUTANEOUS at 04:30

## 2017-02-19 RX ADMIN — ONDANSETRON 4 MG: 2 INJECTION INTRAMUSCULAR; INTRAVENOUS at 04:30

## 2017-02-19 NOTE — ED PROVIDER NOTES
EMERGENCY DEPARTMENT ENCOUNTER    CHIEF COMPLAINT  Chief Complaint: Chest pain, SOB  History given by: Pt  History limited by: N/A  Room Number:   PMD: Miguelito Goodman MD      HPI:  Pt is a 31 y.o. female who presents complaining of chest pain and SOB over the past few hours. She has tried her inhaler at home without relief. Pt also c/o R flank pain radiating into her R groin ongoing for several days. She reports a history of kidney stones and states her flank pain feels similar to those episodes. Pt denies cough, fever, and sore throat. Pt has a history of asthma.    Duration: Several hours  Onset: Sudden  Timing: Constant  Location: Chest, cardiovascular  Radiation: None  Quality: Aching  Intensity/Severity: Moderate  Progression: Unchanged  Associated Symptoms: R flank pain  Aggravating Factors: Nothing  Alleviating Factors: Nothing  Previous Episodes: No  Treatment before arrival: None specified    PAST MEDICAL HISTORY  Active Ambulatory Problems     Diagnosis Date Noted   • Kidney stone 2016     Resolved Ambulatory Problems     Diagnosis Date Noted   • No Resolved Ambulatory Problems     Past Medical History   Diagnosis Date   • Depression    • Diabetes mellitus    • DVT (deep venous thrombosis)    • GERD (gastroesophageal reflux disease)    • Gout    • Hyperlipidemia    • Hypertension    • Hypothyroid    • Migraine    • Neuropathy    • PE (pulmonary embolism)        PAST SURGICAL HISTORY  Past Surgical History   Procedure Laterality Date   •  section     • Cholecystectomy     • Cardiac surgery       Heart Cath done in    • Colonoscopy         FAMILY HISTORY  Family History   Problem Relation Age of Onset   • No Known Problems Mother    • No Known Problems Father    • No Known Problems Sister    • No Known Problems Brother    • No Known Problems Son    • No Known Problems Daughter    • No Known Problems Maternal Grandmother    • No Known Problems Maternal Grandfather    • No Known  Problems Paternal Grandmother    • No Known Problems Paternal Grandfather    • No Known Problems Cousin    • Rheum arthritis Neg Hx    • Osteoarthritis Neg Hx    • Asthma Neg Hx    • Diabetes Neg Hx    • Heart failure Neg Hx    • Hyperlipidemia Neg Hx    • Hypertension Neg Hx    • Migraines Neg Hx    • Rashes / Skin problems Neg Hx    • Seizures Neg Hx    • Stroke Neg Hx    • Thyroid disease Neg Hx        SOCIAL HISTORY  Social History     Social History   • Marital status:      Spouse name: N/A   • Number of children: N/A   • Years of education: N/A     Occupational History   • Not on file.     Social History Main Topics   • Smoking status: Never Smoker   • Smokeless tobacco: Not on file   • Alcohol use No   • Drug use: No   • Sexual activity: Defer     Other Topics Concern   • Not on file     Social History Narrative   • No narrative on file       ALLERGIES  Amoxicillin; Penicillins; and Toradol [ketorolac tromethamine]    REVIEW OF SYSTEMS  Review of Systems   Constitutional: Negative for chills and fever.   HENT: Negative for sore throat and trouble swallowing.    Eyes: Negative for visual disturbance.   Respiratory: Positive for shortness of breath. Negative for cough.    Cardiovascular: Positive for chest pain. Negative for palpitations and leg swelling.   Gastrointestinal: Negative for abdominal pain, diarrhea and vomiting.   Endocrine: Negative.    Genitourinary: Positive for flank pain (R). Negative for decreased urine volume and frequency.   Musculoskeletal: Negative for neck pain.   Skin: Negative for rash.   Allergic/Immunologic: Negative.    Neurological: Negative for syncope, weakness, numbness and headaches.   Hematological: Negative.    Psychiatric/Behavioral: Negative.    All other systems reviewed and are negative.      PHYSICAL EXAM  ED Triage Vitals   Temp Heart Rate Resp BP SpO2   02/19/17 0210 02/19/17 0210 02/19/17 0210 02/19/17 0222 02/19/17 0210   97.3 °F (36.3 °C) 101 18 156/86 95 %       Temp src Heart Rate Source Patient Position BP Location FiO2 (%)   02/19/17 0210 02/19/17 0210 02/19/17 0222 -- --   Tympanic Monitor Sitting         Physical Exam   Constitutional: She is oriented to person, place, and time and well-developed, well-nourished, and in no distress. No distress.   HENT:   Head: Normocephalic and atraumatic.   Eyes: EOM are normal. Pupils are equal, round, and reactive to light.   Neck: Normal range of motion. Neck supple.   Cardiovascular: Normal rate, regular rhythm and normal heart sounds.    Pulmonary/Chest: Effort normal and breath sounds normal. No respiratory distress.   Abdominal: Soft. There is no tenderness. There is CVA tenderness (mild, R). There is no rebound and no guarding.   Pt morbidly obese   Musculoskeletal: Normal range of motion. She exhibits no edema or deformity.   Neurological: She is alert and oriented to person, place, and time. She has normal sensation and normal strength.   No focal motor deficits   Skin: Skin is warm and dry. No rash noted.   Psychiatric: Mood and affect normal.   Nursing note and vitals reviewed.      LAB RESULTS  Lab Results (last 24 hours)     Procedure Component Value Units Date/Time    CBC & Differential [[id number]] Collected:  02/19/17 0252    Specimen:  Blood Updated:  02/19/17 0314    Narrative:       The following orders were created for panel order CBC & Differential.  Procedure                               Abnormality         Status                     ---------                               -----------         ------                     CBC Auto Differential[[id number]]         Abnormal            Final result                 Please view results for these tests on the individual orders.    Comprehensive Metabolic Panel [[id number]]  (Abnormal) Collected:  02/19/17 0252    Specimen:  Blood Updated:  02/19/17 0323     Glucose 241 (H) mg/dL      BUN 9 mg/dL      Creatinine 0.59 mg/dL      Sodium 139 mmol/L      Potassium 3.9 mmol/L       Chloride 98 mmol/L      CO2 24.3 mmol/L      Calcium 9.8 mg/dL      Total Protein 7.8 g/dL      Albumin 4.30 g/dL      ALT (SGPT) 101 (H) U/L      AST (SGOT) 127 (H) U/L      Alkaline Phosphatase 58 U/L      Total Bilirubin 0.3 mg/dL      eGFR Non African Amer 120 mL/min/1.73      Globulin 3.5 gm/dL      A/G Ratio 1.2 g/dL      BUN/Creatinine Ratio 15.3      Anion Gap 16.7 mmol/L     Protime-INR [[id number]]  (Normal) Collected:  02/19/17 0252    Specimen:  Blood Updated:  02/19/17 0345     Protime 13.5 Seconds      INR 1.07     Urinalysis With / Culture If Indicated [[id number]]  (Abnormal) Collected:  02/19/17 0252    Specimen:  Urine from Urine, Clean Catch Updated:  02/19/17 0303     Color, UA Yellow      Appearance, UA Cloudy (A)      pH, UA 8.0      Specific Gravity, UA 1.020      Glucose, UA Negative      Ketones, UA Negative      Bilirubin, UA Negative      Blood, UA Negative      Protein, UA Negative      Leuk Esterase, UA Negative      Nitrite, UA Negative      Urobilinogen, UA 1.0 E.U./dL     Narrative:       Urine microscopic not indicated.    CBC Auto Differential [[id number]]  (Abnormal) Collected:  02/19/17 0252    Specimen:  Blood Updated:  02/19/17 0314     WBC 5.15 10*3/mm3      RBC 3.79 (L) 10*6/mm3      Hemoglobin 11.1 (L) g/dL      Hematocrit 34.3 (L) %      MCV 90.5 fL      MCH 29.3 pg      MCHC 32.4 g/dL      RDW 14.3 (H) %      RDW-SD 46.7 fl      MPV 9.5 fL      Platelets 230 10*3/mm3      Neutrophil % 55.8 %      Lymphocyte % 35.7 %      Monocyte % 6.4 %      Eosinophil % 1.7 %      Basophil % 0.4 %      Immature Grans % 0.0 %      Neutrophils, Absolute 2.87 10*3/mm3      Lymphocytes, Absolute 1.84 10*3/mm3      Monocytes, Absolute 0.33 10*3/mm3      Eosinophils, Absolute 0.09 10*3/mm3      Basophils, Absolute 0.02 10*3/mm3      Immature Grans, Absolute 0.00 10*3/mm3           I ordered the above labs and reviewed the results    RADIOLOGY  CT Abdomen Pelvis Without Contrast    (Results  Pending)   XR Chest 2 View    (Results Pending)   CXR:  NAD    CT Abd Pelvis:  Shows tiny R UVJ stone, no hydro    I ordered the above noted radiological studies. Interpreted by radiologist. Discussed with radiologist (Dr. Alva). Reviewed by me in PACS.     EKG         EKG time: 3:27 AM  Rhythm/Rate: NSR at 76 bpm  P waves and KY: Normal  QRS, axis: Normal  ST and T waves: Inverted T wave in lead III  Interpreted contemporaneously by me and independently viewed.  No prior EKG for comparison.    PROCEDURES  Procedures      PROGRESS AND CONSULTS  ED Course   2:45 AM:  Vitals: BP: 156/86 HR: 101 Temp: 97.3 °F (36.3 °C) (Tympanic) O2 sat: 95%  D/w pt plan for labs, CXR, CT Abd Pelvis, and EKG for further evaluation. Pt understands and agrees with the plan, all questions answered.    4:29 AM:  Vitals: BP: 146/80 HR: 80 Temp: 97.3 °F (36.3 °C) (Tympanic) O2 sat: 96%  Rechecked pt. Pt is resting comfortably. Discussed results of labs and imaging, which showed a small R ureter stone, and the plan for discharge with a prescription for Percocet, Zofran, and Flomax to manage her pain. Advised the pt f/u with her PCP for further care. Pt understands and agrees with the plan, all questions answered.    MEDICAL DECISION MAKING  Results were reviewed/discussed with the patient and they were also made aware of online access. Pt also made aware that some labs, such as cultures, will not be resulted during ER visit and follow up with PMD is necessary.     MDM  Number of Diagnoses or Management Options  Dyspnea, unspecified type:   Kidney stone:      Amount and/or Complexity of Data Reviewed  Clinical lab tests: ordered and reviewed (No significant abnormalities)  Tests in the radiology section of CPT®: reviewed and ordered (CXR: NAD. CT Abd Pelvis: Shows tiny R UVJ stone, no hydro)  Tests in the medicine section of CPT®: ordered and reviewed (EKG time: 3:27 AM  Rhythm/Rate: NSR at 76 bpm  P waves and KY: Normal  QRS, axis:  Normal  ST and T waves: Inverted T wave in lead III  Interpreted contemporaneously by me and independently viewed.  No prior EKG for comparison.)  Discussion of test results with the performing providers: yes  Independent visualization of images, tracings, or specimens: yes    Patient Progress  Patient progress: stable         DIAGNOSIS  Final diagnoses:   Kidney stone   Dyspnea, unspecified type       DISPOSITION  DISCHARGE    Patient discharged in stable condition.    Reviewed implications of results, diagnosis, meds, responsibility to follow up, warning signs and symptoms of possible worsening, potential complications and reasons to return to ER, including any new or worsening symptoms.    Patient/Family voiced understanding of above instructions.    Discussed plan for discharge, as there is no emergent indication for admission.  Pt/family is agreeable and understands need for follow up and repeat testing.  Pt is aware that discharge does not mean that nothing is wrong but it indicates no emergency is present that requires admission and they must continue care with follow-up as given below or physician of their choice.     FOLLOW-UP  Miguelito Goodman MD  [address] [facility] [address] [phone]    Schedule an appointment as soon as possible for a visit           Medication List      New Prescriptions          oxyCODONE-acetaminophen 5-325 MG per tablet   Commonly known as:  PERCOCET   Take 1 tablet by mouth Every 4 (Four) Hours As Needed for moderate pain   (4-6).       tamsulosin 0.4 MG capsule 24 hr capsule   Commonly known as:  FLOMAX   Take 1 capsule by mouth Daily.         Changed          ondansetron 4 MG tablet   Commonly known as:  ZOFRAN   Take 1 tablet by mouth Every 8 (Eight) Hours As Needed for nausea.   What changed:    - when to take this  - reasons to take this  - Another medication with the same name was removed. Continue taking this   medication, and follow the directions  you see here.         Stop          azithromycin 250 MG tablet   Commonly known as:  ZITHROMAX Z-FLORIDA       cyclobenzaprine 10 MG tablet   Commonly known as:  FLEXERIL       guaifenesin-dextromethorphan  MG tablet sustained-release 12 hour   tablet       HYDROcodone-acetaminophen 5-325 MG per tablet   Commonly known as:  NORCO       HYDROcodone-acetaminophen 7.5-325 MG per tablet   Commonly known as:  NORCO       ondansetron ODT 4 MG disintegrating tablet   Commonly known as:  ZOFRAN-ODT       promethazine 25 MG suppository   Commonly known as:  PHENERGAN           Latest Documented Vital Signs:  As of 4:31 AM  BP- 146/80 HR- 80 Temp- 97.3 °F (36.3 °C) (Tympanic) O2 sat- 96%    --  Documentation assistance provided by alexandra Rubalcava for Dr. Schuler.  Information recorded by the scribe was done at my direction and has been verified and validated by me.     Barry Rubalcava  02/19/17 0431       Jayesh Schuler MD  02/19/17 0556

## 2017-02-21 ENCOUNTER — APPOINTMENT (OUTPATIENT)
Dept: GENERAL RADIOLOGY | Facility: HOSPITAL | Age: 32
End: 2017-02-21

## 2017-02-21 ENCOUNTER — APPOINTMENT (OUTPATIENT)
Dept: CT IMAGING | Facility: HOSPITAL | Age: 32
End: 2017-02-21

## 2017-02-21 ENCOUNTER — HOSPITAL ENCOUNTER (EMERGENCY)
Facility: HOSPITAL | Age: 32
Discharge: HOME | End: 2017-02-21
Attending: EMERGENCY MEDICINE | Admitting: EMERGENCY MEDICINE

## 2017-02-21 VITALS
TEMPERATURE: 98 F | RESPIRATION RATE: 18 BRPM | HEART RATE: 82 BPM | HEIGHT: 64 IN | WEIGHT: 293 LBS | BODY MASS INDEX: 50.02 KG/M2 | OXYGEN SATURATION: 97 % | SYSTOLIC BLOOD PRESSURE: 140 MMHG | DIASTOLIC BLOOD PRESSURE: 88 MMHG

## 2017-02-21 DIAGNOSIS — R10.9 CHRONIC RIGHT FLANK PAIN: Primary | ICD-10-CM

## 2017-02-21 DIAGNOSIS — G89.29 CHRONIC RIGHT FLANK PAIN: Primary | ICD-10-CM

## 2017-02-21 LAB
ALBUMIN SERPL-MCNC: 4.1 G/DL (ref 3.5–5)
ALBUMIN/GLOB SERPL: 1.3 G/DL (ref 1.5–2.5)
ALP SERPL-CCNC: 58 U/L (ref 35–104)
ALT SERPL W P-5'-P-CCNC: 115 U/L (ref 10–36)
ANION GAP SERPL CALCULATED.3IONS-SCNC: 5 MMOL/L (ref 3.6–11.2)
AST SERPL-CCNC: 178 U/L (ref 10–30)
B-HCG UR QL: NEGATIVE
BASOPHILS # BLD AUTO: 0.01 10*3/MM3 (ref 0–0.3)
BASOPHILS NFR BLD AUTO: 0.3 % (ref 0–2)
BILIRUB SERPL-MCNC: 0.4 MG/DL (ref 0.2–1.8)
BILIRUB UR QL STRIP: NEGATIVE
BUN BLD-MCNC: 7 MG/DL (ref 7–21)
BUN/CREAT SERPL: 12.7 (ref 7–25)
CALCIUM SPEC-SCNC: 9 MG/DL (ref 7.7–10)
CHLORIDE SERPL-SCNC: 107 MMOL/L (ref 99–112)
CK MB SERPL-CCNC: <0.18 NG/ML (ref 0–5)
CK MB SERPL-RTO: NORMAL % (ref 0–3)
CK SERPL-CCNC: 70 U/L (ref 24–173)
CLARITY UR: CLEAR
CO2 SERPL-SCNC: 25 MMOL/L (ref 24.3–31.9)
COLOR UR: YELLOW
CREAT BLD-MCNC: 0.55 MG/DL (ref 0.43–1.29)
DEPRECATED RDW RBC AUTO: 46.1 FL (ref 37–54)
EOSINOPHIL # BLD AUTO: 0.08 10*3/MM3 (ref 0–0.7)
EOSINOPHIL NFR BLD AUTO: 2.1 % (ref 0–5)
ERYTHROCYTE [DISTWIDTH] IN BLOOD BY AUTOMATED COUNT: 14.2 % (ref 11.5–14.5)
GFR SERPL CREATININE-BSD FRML MDRD: 130 ML/MIN/1.73
GLOBULIN UR ELPH-MCNC: 3.1 GM/DL
GLUCOSE BLD-MCNC: 280 MG/DL (ref 70–110)
GLUCOSE UR STRIP-MCNC: ABNORMAL MG/DL
HCT VFR BLD AUTO: 33.2 % (ref 37–47)
HGB BLD-MCNC: 10.5 G/DL (ref 12–16)
HGB UR QL STRIP.AUTO: NEGATIVE
IMM GRANULOCYTES # BLD: 0.01 10*3/MM3 (ref 0–0.03)
IMM GRANULOCYTES NFR BLD: 0.3 % (ref 0–0.5)
KETONES UR QL STRIP: NEGATIVE
LEUKOCYTE ESTERASE UR QL STRIP.AUTO: NEGATIVE
LYMPHOCYTES # BLD AUTO: 1.2 10*3/MM3 (ref 1–3)
LYMPHOCYTES NFR BLD AUTO: 31.3 % (ref 21–51)
MCH RBC QN AUTO: 29.2 PG (ref 27–33)
MCHC RBC AUTO-ENTMCNC: 31.6 G/DL (ref 33–37)
MCV RBC AUTO: 92.5 FL (ref 80–94)
MONOCYTES # BLD AUTO: 0.35 10*3/MM3 (ref 0.1–0.9)
MONOCYTES NFR BLD AUTO: 9.1 % (ref 0–10)
MYOGLOBIN SERPL-MCNC: 12 NG/ML (ref 0–109)
NEUTROPHILS # BLD AUTO: 2.18 10*3/MM3 (ref 1.4–6.5)
NEUTROPHILS NFR BLD AUTO: 56.9 % (ref 30–70)
NITRITE UR QL STRIP: NEGATIVE
OSMOLALITY SERPL CALC.SUM OF ELEC: 281.9 MOSM/KG (ref 273–305)
PH UR STRIP.AUTO: 8 [PH] (ref 5–8)
PLATELET # BLD AUTO: 212 10*3/MM3 (ref 130–400)
PMV BLD AUTO: 8.9 FL (ref 6–10)
POTASSIUM BLD-SCNC: 4.1 MMOL/L (ref 3.5–5.3)
PROT SERPL-MCNC: 7.2 G/DL (ref 6–8)
PROT UR QL STRIP: NEGATIVE
RBC # BLD AUTO: 3.59 10*6/MM3 (ref 4.2–5.4)
SODIUM BLD-SCNC: 137 MMOL/L (ref 135–153)
SP GR UR STRIP: 1.02 (ref 1–1.03)
TROPONIN I SERPL-MCNC: <0.006 NG/ML
UROBILINOGEN UR QL STRIP: ABNORMAL
WBC NRBC COR # BLD: 3.83 10*3/MM3 (ref 4.5–12.5)

## 2017-02-21 PROCEDURE — 80053 COMPREHEN METABOLIC PANEL: CPT | Performed by: PHYSICIAN ASSISTANT

## 2017-02-21 PROCEDURE — 74176 CT ABD & PELVIS W/O CONTRAST: CPT | Performed by: RADIOLOGY

## 2017-02-21 PROCEDURE — 71020 XR CHEST 2 VW: CPT | Performed by: RADIOLOGY

## 2017-02-21 PROCEDURE — 81003 URINALYSIS AUTO W/O SCOPE: CPT | Performed by: PHYSICIAN ASSISTANT

## 2017-02-21 PROCEDURE — 84484 ASSAY OF TROPONIN QUANT: CPT | Performed by: PHYSICIAN ASSISTANT

## 2017-02-21 PROCEDURE — 71020 HC CHEST PA AND LATERAL: CPT

## 2017-02-21 PROCEDURE — 82553 CREATINE MB FRACTION: CPT | Performed by: PHYSICIAN ASSISTANT

## 2017-02-21 PROCEDURE — 93005 ELECTROCARDIOGRAM TRACING: CPT | Performed by: PHYSICIAN ASSISTANT

## 2017-02-21 PROCEDURE — 96360 HYDRATION IV INFUSION INIT: CPT

## 2017-02-21 PROCEDURE — 85025 COMPLETE CBC W/AUTO DIFF WBC: CPT | Performed by: PHYSICIAN ASSISTANT

## 2017-02-21 PROCEDURE — 74176 CT ABD & PELVIS W/O CONTRAST: CPT

## 2017-02-21 PROCEDURE — 81025 URINE PREGNANCY TEST: CPT | Performed by: PHYSICIAN ASSISTANT

## 2017-02-21 PROCEDURE — 99284 EMERGENCY DEPT VISIT MOD MDM: CPT

## 2017-02-21 PROCEDURE — 82550 ASSAY OF CK (CPK): CPT | Performed by: PHYSICIAN ASSISTANT

## 2017-02-21 PROCEDURE — 83874 ASSAY OF MYOGLOBIN: CPT | Performed by: PHYSICIAN ASSISTANT

## 2017-02-21 RX ORDER — SODIUM CHLORIDE 0.9 % (FLUSH) 0.9 %
10 SYRINGE (ML) INJECTION AS NEEDED
Status: DISCONTINUED | OUTPATIENT
Start: 2017-02-21 | End: 2017-02-21 | Stop reason: HOSPADM

## 2017-02-21 RX ORDER — ACETAMINOPHEN 325 MG/1
1000 TABLET ORAL ONCE
Status: COMPLETED | OUTPATIENT
Start: 2017-02-21 | End: 2017-02-21

## 2017-02-21 RX ADMIN — SODIUM CHLORIDE 1000 ML: 9 INJECTION, SOLUTION INTRAVENOUS at 03:31

## 2017-02-21 RX ADMIN — ACETAMINOPHEN 975 MG: 325 TABLET, FILM COATED ORAL at 04:59

## 2017-02-21 NOTE — ED NOTES
Patient is leaving ED with patient's family x3. Patient is in no acute distress at discharge, patient's respirations are regular and unlabored at this time.     Susu Castellon RN  02/21/17 0571

## 2017-02-21 NOTE — ED PROVIDER NOTES
Subjective   Patient is a 31 y.o. female presenting with shortness of breath and flank pain.   History provided by:  Patient   used: No    Shortness of Breath   Severity:  Mild  Onset quality:  Sudden  Duration:  3 days  Timing:  Constant  Progression:  Worsening  Chronicity:  New  Relieved by:  Nothing  Worsened by:  Nothing  Ineffective treatments:  None tried  Risk factors: hx of PE/DVT and obesity    Risk factors comment:  Patient is taking Xarelto for her hx of PE and DVT  Flank Pain   Pain location:  R flank  Pain quality: sharp    Pain radiates to:  Does not radiate  Pain severity:  Mild  Onset quality:  Sudden  Duration:  3 days  Timing:  Constant  Progression:  Worsening  Chronicity:  New  Relieved by:  Nothing  Worsened by:  Nothing  Ineffective treatments:  None tried  Associated symptoms: dysuria and shortness of breath    Risk factors: obesity        Review of Systems   Constitutional: Negative.    HENT: Negative.    Eyes: Negative.    Respiratory: Positive for shortness of breath.    Cardiovascular: Negative.    Gastrointestinal: Negative.    Endocrine: Negative.    Genitourinary: Positive for dysuria and flank pain.   Skin: Negative.    Allergic/Immunologic: Negative.    Neurological: Negative.    Hematological: Negative.    Psychiatric/Behavioral: Negative.    All other systems reviewed and are negative.      Past Medical History   Diagnosis Date   • Depression    • Diabetes mellitus    • DVT (deep venous thrombosis)    • GERD (gastroesophageal reflux disease)    • Gout    • Hyperlipidemia    • Hypertension    • Hypothyroid    • Kidney stone    • Migraine    • Neuropathy    • PE (pulmonary embolism)        Allergies   Allergen Reactions   • Amoxicillin    • Penicillins    • Toradol [Ketorolac Tromethamine]        Past Surgical History   Procedure Laterality Date   •  section     • Cholecystectomy     • Cardiac surgery       Heart Cath done in    • Colonoscopy          Family History   Problem Relation Age of Onset   • No Known Problems Mother    • No Known Problems Father    • No Known Problems Sister    • No Known Problems Brother    • No Known Problems Son    • No Known Problems Daughter    • No Known Problems Maternal Grandmother    • No Known Problems Maternal Grandfather    • No Known Problems Paternal Grandmother    • No Known Problems Paternal Grandfather    • No Known Problems Cousin    • Rheum arthritis Neg Hx    • Osteoarthritis Neg Hx    • Asthma Neg Hx    • Diabetes Neg Hx    • Heart failure Neg Hx    • Hyperlipidemia Neg Hx    • Hypertension Neg Hx    • Migraines Neg Hx    • Rashes / Skin problems Neg Hx    • Seizures Neg Hx    • Stroke Neg Hx    • Thyroid disease Neg Hx        Social History     Social History   • Marital status:      Spouse name: N/A   • Number of children: N/A   • Years of education: N/A     Social History Main Topics   • Smoking status: Never Smoker   • Smokeless tobacco: None   • Alcohol use No   • Drug use: No   • Sexual activity: Defer     Other Topics Concern   • None     Social History Narrative           Objective   Physical Exam   Constitutional: She is oriented to person, place, and time. She appears well-developed and well-nourished. No distress.   HENT:   Head: Normocephalic and atraumatic.   Right Ear: External ear normal.   Left Ear: External ear normal.   Nose: Nose normal.   Mouth/Throat: Oropharynx is clear and moist. No oropharyngeal exudate.   Eyes: EOM are normal. Pupils are equal, round, and reactive to light. Right eye exhibits no discharge. Left eye exhibits no discharge. No scleral icterus.   Neck: Normal range of motion. Neck supple. No JVD present. No tracheal deviation present. No thyromegaly present.   Cardiovascular: Normal rate, regular rhythm, normal heart sounds and intact distal pulses.  Exam reveals no gallop and no friction rub.    No murmur heard.  Pulmonary/Chest: Effort normal and breath sounds  normal. No stridor. No respiratory distress. She has no wheezes. She has no rales. She exhibits no tenderness.   Abdominal: Soft. Bowel sounds are normal. She exhibits no distension and no mass. There is no tenderness. There is no rebound and no guarding. No hernia.   Musculoskeletal: Normal range of motion. She exhibits no edema, tenderness or deformity.   Lymphadenopathy:     She has no cervical adenopathy.   Neurological: She is alert and oriented to person, place, and time. She displays normal reflexes. No cranial nerve deficit. Coordination normal.   Skin: Skin is warm and dry. No rash noted. She is not diaphoretic. No erythema. No pallor.   Psychiatric: She has a normal mood and affect. Her behavior is normal. Judgment and thought content normal.   Nursing note and vitals reviewed.      Procedures         ED Course  ED Course   Value Comment By Time   ECG 12 Lead 0319- Reviewed by Dr. Davis, rate 78, sinus rhythm, no ischemia PRICE Hadley 02/21 0322    Discussed case with PRICE Schwab 02/21 0287    Discussed results with patient and instructed PCP f/u. Discussed sx that would warrant immediate return to the ED.  PRICE Hadley 02/21 2032                  [facility]    Final diagnoses:   Chronic right flank pain            PRICE Hadley  02/21/17 8385

## 2017-02-21 NOTE — ED NOTES
Patient reports increased pain at this time, PRICE Miguel, notified. No new orders noted     Susu Castellon RN  02/21/17 9326

## 2017-02-21 NOTE — ED NOTES
"Patient reports that pain is \"more than a 10 right now, I can't hardly stand it.\" PRICE Miguel  Notified. Patient's respirations are regular and unlabored, skin is pink, warm, and dry, NADN, will continue to monitor.     Susu Castellon RN  02/21/17 0522    "

## 2017-02-22 ENCOUNTER — TRANSCRIBE ORDERS (OUTPATIENT)
Dept: ADMINISTRATIVE | Facility: HOSPITAL | Age: 32
End: 2017-02-22

## 2017-02-22 DIAGNOSIS — D25.9 UTERINE LEIOMYOMA, UNSPECIFIED LOCATION: Primary | ICD-10-CM

## 2017-02-25 ENCOUNTER — APPOINTMENT (OUTPATIENT)
Dept: GENERAL RADIOLOGY | Facility: HOSPITAL | Age: 32
End: 2017-02-25

## 2017-02-25 ENCOUNTER — HOSPITAL ENCOUNTER (EMERGENCY)
Facility: HOSPITAL | Age: 32
Discharge: HOME | End: 2017-02-25
Attending: EMERGENCY MEDICINE | Admitting: EMERGENCY MEDICINE

## 2017-02-25 VITALS
DIASTOLIC BLOOD PRESSURE: 82 MMHG | OXYGEN SATURATION: 96 % | TEMPERATURE: 98.4 F | BODY MASS INDEX: 45.24 KG/M2 | WEIGHT: 265 LBS | HEIGHT: 64 IN | HEART RATE: 79 BPM | RESPIRATION RATE: 20 BRPM | SYSTOLIC BLOOD PRESSURE: 134 MMHG

## 2017-02-25 DIAGNOSIS — R10.9 ACUTE RIGHT FLANK PAIN: Primary | ICD-10-CM

## 2017-02-25 DIAGNOSIS — R06.00 DYSPNEA, UNSPECIFIED TYPE: ICD-10-CM

## 2017-02-25 LAB
BILIRUB UR QL STRIP: NEGATIVE
CLARITY UR: ABNORMAL
COLOR UR: YELLOW
GLUCOSE UR STRIP-MCNC: NEGATIVE MG/DL
HGB UR QL STRIP.AUTO: NEGATIVE
KETONES UR QL STRIP: NEGATIVE
LEUKOCYTE ESTERASE UR QL STRIP.AUTO: NEGATIVE
NITRITE UR QL STRIP: NEGATIVE
PH UR STRIP.AUTO: 5.5 [PH] (ref 5–8)
PROT UR QL STRIP: NEGATIVE
SP GR UR STRIP: 1.02 (ref 1–1.03)
UROBILINOGEN UR QL STRIP: ABNORMAL

## 2017-02-25 PROCEDURE — 74022 RADEX COMPL AQT ABD SERIES: CPT

## 2017-02-25 PROCEDURE — 81003 URINALYSIS AUTO W/O SCOPE: CPT | Performed by: EMERGENCY MEDICINE

## 2017-02-25 PROCEDURE — 99283 EMERGENCY DEPT VISIT LOW MDM: CPT

## 2017-02-25 RX ORDER — HYDROCODONE BITARTRATE AND ACETAMINOPHEN 7.5; 325 MG/1; MG/1
1 TABLET ORAL ONCE
Status: COMPLETED | OUTPATIENT
Start: 2017-02-25 | End: 2017-02-25

## 2017-02-25 RX ADMIN — HYDROCODONE BITARTRATE AND ACETAMINOPHEN 1 TABLET: 7.5; 325 TABLET ORAL at 03:53

## 2017-03-04 ENCOUNTER — HOSPITAL ENCOUNTER (EMERGENCY)
Facility: HOSPITAL | Age: 32
Discharge: HOME | End: 2017-03-04
Attending: EMERGENCY MEDICINE | Admitting: EMERGENCY MEDICINE

## 2017-03-04 ENCOUNTER — APPOINTMENT (OUTPATIENT)
Dept: GENERAL RADIOLOGY | Facility: HOSPITAL | Age: 32
End: 2017-03-04

## 2017-03-04 VITALS
BODY MASS INDEX: 50.02 KG/M2 | WEIGHT: 293 LBS | DIASTOLIC BLOOD PRESSURE: 91 MMHG | TEMPERATURE: 97.8 F | SYSTOLIC BLOOD PRESSURE: 153 MMHG | HEIGHT: 64 IN | OXYGEN SATURATION: 94 % | HEART RATE: 85 BPM | RESPIRATION RATE: 18 BRPM

## 2017-03-04 DIAGNOSIS — R10.9 RIGHT FLANK PAIN: Primary | ICD-10-CM

## 2017-03-04 LAB
BACTERIA UR QL AUTO: ABNORMAL /HPF
BILIRUB UR QL STRIP: NEGATIVE
CLARITY UR: CLEAR
COLOR UR: YELLOW
GLUCOSE UR STRIP-MCNC: ABNORMAL MG/DL
HGB UR QL STRIP.AUTO: ABNORMAL
HYALINE CASTS UR QL AUTO: ABNORMAL /LPF
KETONES UR QL STRIP: ABNORMAL
LEUKOCYTE ESTERASE UR QL STRIP.AUTO: NEGATIVE
NITRITE UR QL STRIP: NEGATIVE
PH UR STRIP.AUTO: 7.5 [PH] (ref 5–8)
PROT UR QL STRIP: NEGATIVE
RBC # UR: ABNORMAL /HPF
REF LAB TEST METHOD: ABNORMAL
SP GR UR STRIP: 1.02 (ref 1–1.03)
SQUAMOUS #/AREA URNS HPF: ABNORMAL /HPF
UROBILINOGEN UR QL STRIP: ABNORMAL
WBC UR QL AUTO: ABNORMAL /HPF

## 2017-03-04 PROCEDURE — 25010000002 HYDROMORPHONE PER 4 MG: Performed by: EMERGENCY MEDICINE

## 2017-03-04 PROCEDURE — 74000 HC ABDOMEN KUB: CPT

## 2017-03-04 PROCEDURE — 25010000002 PROMETHAZINE PER 50 MG: Performed by: EMERGENCY MEDICINE

## 2017-03-04 PROCEDURE — 96374 THER/PROPH/DIAG INJ IV PUSH: CPT

## 2017-03-04 PROCEDURE — 99284 EMERGENCY DEPT VISIT MOD MDM: CPT

## 2017-03-04 PROCEDURE — 96375 TX/PRO/DX INJ NEW DRUG ADDON: CPT

## 2017-03-04 PROCEDURE — 81001 URINALYSIS AUTO W/SCOPE: CPT | Performed by: EMERGENCY MEDICINE

## 2017-03-04 RX ORDER — PROMETHAZINE HYDROCHLORIDE 25 MG/ML
12.5 INJECTION, SOLUTION INTRAMUSCULAR; INTRAVENOUS ONCE
Status: COMPLETED | OUTPATIENT
Start: 2017-03-04 | End: 2017-03-04

## 2017-03-04 RX ORDER — PROMETHAZINE HYDROCHLORIDE 25 MG/ML
25 INJECTION, SOLUTION INTRAMUSCULAR; INTRAVENOUS ONCE
Status: DISCONTINUED | OUTPATIENT
Start: 2017-03-04 | End: 2017-03-04

## 2017-03-04 RX ORDER — HYDROCODONE BITARTRATE AND ACETAMINOPHEN 7.5; 325 MG/1; MG/1
2 TABLET ORAL ONCE
Status: COMPLETED | OUTPATIENT
Start: 2017-03-04 | End: 2017-03-04

## 2017-03-04 RX ADMIN — PROMETHAZINE HYDROCHLORIDE 12.5 MG: 25 INJECTION INTRAMUSCULAR; INTRAVENOUS at 02:24

## 2017-03-04 RX ADMIN — HYDROMORPHONE HYDROCHLORIDE 1 MG: 1 INJECTION, SOLUTION INTRAMUSCULAR; INTRAVENOUS; SUBCUTANEOUS at 02:25

## 2017-03-04 RX ADMIN — HYDROCODONE BITARTRATE AND ACETAMINOPHEN 2 TABLET: 7.5; 325 TABLET ORAL at 01:16

## 2017-03-04 NOTE — ED NOTES
Constant right flank pain for 6-7 hours. Pt. Says her symptoms started 14 hours ago (pain, painful/burning urination, blood in urine).     Dharmesh Tran RN  03/04/17 0036

## 2017-03-04 NOTE — ED PROVIDER NOTES
EMERGENCY DEPARTMENT ENCOUNTER    CHIEF COMPLAINT  Chief Complaint: flank pain  History given by: patient, family  History limited by: none  Room Number:   PMD: Miguelito Goodman MD      HPI:  Pt is a 31 y.o. female who presents complaining of right flank pain onset 16 hours ago this morning. She describes the pain as sharp and stabbing.   She also c/o SOA and nausea.  She denies fever, HA, cough, vomiting, diarrhea, and weight change.    H/o similar pain due to kidney stone one month ago.     Duration:  16 hours ago  Onset: gradual  Timing: constant  Location: right flank  Radiation: none  Quality: sharp, stabbing  Intensity/Severity: pt states her pain is severe  Progression: unchanged  Associated Symptoms: SOA, nausea  Aggravating Factors: movement  Alleviating Factors: none  Previous Episodes: h/o similar abd pain over the last few weeks  Treatment before arrival: denies    PAST MEDICAL HISTORY  Active Ambulatory Problems     Diagnosis Date Noted   • Kidney stone 2016     Resolved Ambulatory Problems     Diagnosis Date Noted   • No Resolved Ambulatory Problems     Past Medical History   Diagnosis Date   • Depression    • Diabetes mellitus    • DVT (deep venous thrombosis)    • GERD (gastroesophageal reflux disease)    • Gout    • Hyperlipidemia    • Hypertension    • Hypothyroid    • Migraine    • Neuropathy    • PE (pulmonary embolism)        PAST SURGICAL HISTORY  Past Surgical History   Procedure Laterality Date   •  section     • Cholecystectomy     • Cardiac surgery       Heart Cath done in    • Colonoscopy         FAMILY HISTORY  Family History   Problem Relation Age of Onset   • No Known Problems Mother    • No Known Problems Father    • No Known Problems Sister    • No Known Problems Brother    • No Known Problems Son    • No Known Problems Daughter    • No Known Problems Maternal Grandmother    • No Known Problems Maternal Grandfather    • No Known Problems Paternal Grandmother     • No Known Problems Paternal Grandfather    • No Known Problems Cousin    • Rheum arthritis Neg Hx    • Osteoarthritis Neg Hx    • Asthma Neg Hx    • Diabetes Neg Hx    • Heart failure Neg Hx    • Hyperlipidemia Neg Hx    • Hypertension Neg Hx    • Migraines Neg Hx    • Rashes / Skin problems Neg Hx    • Seizures Neg Hx    • Stroke Neg Hx    • Thyroid disease Neg Hx        SOCIAL HISTORY  Social History     Social History   • Marital status:      Spouse name: N/A   • Number of children: N/A   • Years of education: N/A     Occupational History   • Not on file.     Social History Main Topics   • Smoking status: Never Smoker   • Smokeless tobacco: Not on file   • Alcohol use No   • Drug use: No   • Sexual activity: Defer     Other Topics Concern   • Not on file     Social History Narrative       ALLERGIES  Amoxicillin; Penicillins; and Toradol [ketorolac tromethamine]    REVIEW OF SYSTEMS  Review of Systems   Constitutional: Negative.  Negative for unexpected weight change.   HENT: Negative.    Respiratory: Positive for shortness of breath.    Cardiovascular: Negative.    Gastrointestinal: Positive for nausea.   Genitourinary: Positive for flank pain.   All other systems reviewed and are negative.      PHYSICAL EXAM  ED Triage Vitals   Temp Heart Rate Resp BP SpO2   03/04/17 0013 03/04/17 0013 03/04/17 0013 03/04/17 0024 03/04/17 0013   97.8 °F (36.6 °C) 94 22 147/86 96 %      Temp src Heart Rate Source Patient Position BP Location FiO2 (%)   03/04/17 0013 03/04/17 0013 03/04/17 0024 03/04/17 0024 --   Tympanic Monitor Sitting Right arm        Physical Exam   Constitutional: She is oriented to person, place, and time. She appears distressed (pt appears to be in mild distress).   HENT:   Head: Atraumatic.   Cardiovascular: Normal rate and regular rhythm.    Pulmonary/Chest: Effort normal and breath sounds normal. No respiratory distress.   Abdominal: Soft. There is tenderness (mild diffuse tenderness). There  is no rebound and no guarding.   ABD obese.  No CVA tenderness   Musculoskeletal: She exhibits tenderness (left lower lumbar tenderness). She exhibits no edema.   Neurological: She is alert and oriented to person, place, and time.   Nursing note and vitals reviewed.      LAB RESULTS  Lab Results (last 24 hours)     Procedure Component Value Units Date/Time    Urinalysis With / Culture If Indicated [[id number]]  (Abnormal) Collected:  03/04/17 0111    Specimen:  Urine from Urine, Clean Catch Updated:  03/04/17 0131     Color, UA Yellow      Appearance, UA Clear      pH, UA 7.5      Specific Gravity, UA 1.021      Glucose, UA >=1000 mg/dL (3+) (A)      Ketones, UA 15 mg/dL (1+) (A)      Bilirubin, UA Negative      Blood, UA Small (1+) (A)      Protein, UA Negative      Leuk Esterase, UA Negative      Nitrite, UA Negative      Urobilinogen, UA 0.2 E.U./dL     Urinalysis, Microscopic Only [[id number]]  (Abnormal) Collected:  03/04/17 0111    Specimen:  Urine from Urine, Clean Catch Updated:  03/04/17 0131     RBC, UA 0-2 (A) /HPF      WBC, UA 0-2 (A) /HPF      Bacteria, UA None Seen /HPF      Squamous Epithelial Cells, UA 0-2 /HPF      Hyaline Casts, UA None Seen /LPF      Methodology Automated Microscopy           I ordered the above labs and reviewed the results    RADIOLOGY  XR Abdomen KUB    (Results Pending)        I ordered the above noted radiological studies. Interpreted by radiologist. Reviewed by me in PACS.       PROCEDURES  Procedures      PROGRESS AND CONSULTS  ED Course   Comment By Time   1:06 AM  Pt with numerous ER visits (7 in February) many for flank pain.  Had Neg CT on 2/21/17.  I count 11 prior CTs.  Will check UA and plain film to start.  PO Lortab. Neo Segura MD 03/04 0107     1:08 AM  D/w pt review of old records and risks of frequent exposure to radiation from CT scans.   Ordered UA and KUB for further evaluation.     2:04 AM  Pt rechecked. D/w pt radiology results which showed a 3 mm stone in  upper left kidney. D/w pt risks of further radiology given her previous frequency of CTs. Decision to d/c pt was discussed along with referral for urologist. Pt understands and agrees with plan of care, all questions and concerns addressed.         MEDICAL DECISION MAKING  Results were reviewed/discussed with the patient and they were also made aware of online access. Pt also made aware that some labs, such as cultures, will not be resulted during ER visit and follow up with PMD is necessary.     MDM  Number of Diagnoses or Management Options     Amount and/or Complexity of Data Reviewed  Clinical lab tests: reviewed and ordered  Tests in the radiology section of CPT®: reviewed and ordered (ABD KUB - 3mm stone in left upper kidney     Independently viewed by me. Interpreted by radiologist  )  Decide to obtain previous medical records or to obtain history from someone other than the patient: yes (Pt has numerous visits to ED including this ED for similar sx.     Last CT ABD/PEL on 2/21/17:  1. Hepatosplenomegaly.  2. Nonobstructing stones in both kidneys.  3. Right adnexal mass is stable.)  Review and summarize past medical records: yes           DIAGNOSIS  Final diagnoses:   Right flank pain       DISPOSITION    DISCHARGE    Patient discharged in stable condition.    Reviewed implications of results, diagnosis, meds, responsibility to follow up, warning signs and symptoms of possible worsening, potential complications and reasons to return to ER.    Patient/Family voiced understanding of above instructions.    Discussed plan for discharge, as there is no emergent indication for admission.  Pt/family is agreeable and understands need for follow up and repeat testing.  Pt is aware that discharge does not mean that nothing is wrong but it indicates no emergency is present that requires admission and they must continue care with follow-up as given below or physician of their choice.     FOLLOW-UP  Miguelito Goodman,  MD  [address] [facility] [address]  [phone]          Riccardo Thao MD  [address] [place] [address] [place] [id number] [phone]      Call for Appointment         Medication List      Changed          ondansetron 4 MG tablet   Commonly known as:  ZOFRAN   Take 1 tablet (4 mg total) by mouth every 6 (six) hours   What changed:  Another medication with the same name was removed. Continue   taking this medication, and follow the directions you see here.         Stop          azithromycin 250 MG tablet   Commonly known as:  ZITHROMAX Z-FLORIDA       cyclobenzaprine 10 MG tablet   Commonly known as:  FLEXERIL       dicyclomine 20 MG tablet   Commonly known as:  BENTYL       HYDROcodone-acetaminophen 5-325 MG per tablet   Commonly known as:  NORCO       HYDROcodone-acetaminophen 7.5-325 MG per tablet   Commonly known as:  NORCO       oxyCODONE-acetaminophen 5-325 MG per tablet   Commonly known as:  PERCOCET       promethazine 25 MG suppository   Commonly known as:  PHENERGAN       tamsulosin 0.4 MG capsule 24 hr capsule   Commonly known as:  FLOMAX               Latest Documented Vital Signs:  As of 2:14 AM  BP- 147/86 HR- 89 Temp- 97.8 °F (36.6 °C) (Tympanic) O2 sat- 96%    --  Documentation assistance provided by alexandra Flores for Dr. Segura.  Information recorded by the scribe was done at my direction and has been verified and validated by me.       Celso Flores  03/04/17 0159       Celso Flores  03/04/17 0214       Neo Segura MD  03/04/17 0223

## 2017-06-20 ENCOUNTER — HOSPITAL ENCOUNTER (EMERGENCY)
Facility: HOSPITAL | Age: 32
Discharge: HOME | End: 2017-06-21
Attending: EMERGENCY MEDICINE | Admitting: FAMILY MEDICINE

## 2017-06-20 DIAGNOSIS — R10.11 CHRONIC RIGHT UPPER QUADRANT PAIN: ICD-10-CM

## 2017-06-20 DIAGNOSIS — G89.29 CHRONIC RIGHT UPPER QUADRANT PAIN: ICD-10-CM

## 2017-06-20 DIAGNOSIS — N94.89 ADNEXAL MASS: Primary | ICD-10-CM

## 2017-06-20 PROCEDURE — 93005 ELECTROCARDIOGRAM TRACING: CPT | Performed by: NURSE PRACTITIONER

## 2017-06-20 PROCEDURE — 99284 EMERGENCY DEPT VISIT MOD MDM: CPT

## 2017-06-20 RX ORDER — WARFARIN SODIUM 10 MG/1
12 TABLET ORAL
COMMUNITY
End: 2019-04-25

## 2017-06-20 RX ORDER — SODIUM CHLORIDE 0.9 % (FLUSH) 0.9 %
10 SYRINGE (ML) INJECTION AS NEEDED
Status: DISCONTINUED | OUTPATIENT
Start: 2017-06-20 | End: 2017-06-21 | Stop reason: HOSPADM

## 2017-06-21 ENCOUNTER — APPOINTMENT (OUTPATIENT)
Dept: GENERAL RADIOLOGY | Facility: HOSPITAL | Age: 32
End: 2017-06-21

## 2017-06-21 ENCOUNTER — APPOINTMENT (OUTPATIENT)
Dept: ULTRASOUND IMAGING | Facility: HOSPITAL | Age: 32
End: 2017-06-21

## 2017-06-21 ENCOUNTER — APPOINTMENT (OUTPATIENT)
Dept: CT IMAGING | Facility: HOSPITAL | Age: 32
End: 2017-06-21

## 2017-06-21 VITALS
BODY MASS INDEX: 50.02 KG/M2 | TEMPERATURE: 97.5 F | SYSTOLIC BLOOD PRESSURE: 143 MMHG | WEIGHT: 293 LBS | HEIGHT: 64 IN | HEART RATE: 75 BPM | OXYGEN SATURATION: 97 % | RESPIRATION RATE: 18 BRPM | DIASTOLIC BLOOD PRESSURE: 83 MMHG

## 2017-06-21 LAB
6-ACETYL MORPHINE: NEGATIVE
ALBUMIN SERPL-MCNC: 4.4 G/DL (ref 3.5–5)
ALBUMIN/GLOB SERPL: 1.3 G/DL (ref 1.5–2.5)
ALP SERPL-CCNC: 76 U/L (ref 35–104)
ALT SERPL W P-5'-P-CCNC: 64 U/L (ref 10–36)
AMPHET+METHAMPHET UR QL: NEGATIVE
AMYLASE SERPL-CCNC: 33 U/L (ref 28–100)
ANION GAP SERPL CALCULATED.3IONS-SCNC: 13.6 MMOL/L (ref 3.6–11.2)
AST SERPL-CCNC: 105 U/L (ref 10–30)
B-HCG UR QL: NEGATIVE
BARBITURATES UR QL SCN: NEGATIVE
BASOPHILS # BLD AUTO: 0.02 10*3/MM3 (ref 0–0.3)
BASOPHILS NFR BLD AUTO: 0.4 % (ref 0–2)
BENZODIAZ UR QL SCN: NEGATIVE
BILIRUB SERPL-MCNC: 0.3 MG/DL (ref 0.2–1.8)
BILIRUB UR QL STRIP: NEGATIVE
BUN BLD-MCNC: 10 MG/DL (ref 7–21)
BUN/CREAT SERPL: 15.4 (ref 7–25)
BUPRENORPHINE SERPL-MCNC: NEGATIVE NG/ML
CALCIUM SPEC-SCNC: 9.7 MG/DL (ref 7.7–10)
CANNABINOIDS SERPL QL: NEGATIVE
CHLORIDE SERPL-SCNC: 105 MMOL/L (ref 99–112)
CLARITY UR: CLEAR
CO2 SERPL-SCNC: 23.4 MMOL/L (ref 24.3–31.9)
COCAINE UR QL: NEGATIVE
COLOR UR: YELLOW
CREAT BLD-MCNC: 0.65 MG/DL (ref 0.43–1.29)
DEPRECATED RDW RBC AUTO: 47.5 FL (ref 37–54)
EOSINOPHIL # BLD AUTO: 0.13 10*3/MM3 (ref 0–0.7)
EOSINOPHIL NFR BLD AUTO: 2.8 % (ref 0–5)
ERYTHROCYTE [DISTWIDTH] IN BLOOD BY AUTOMATED COUNT: 15.8 % (ref 11.5–14.5)
GFR SERPL CREATININE-BSD FRML MDRD: 106 ML/MIN/1.73
GLOBULIN UR ELPH-MCNC: 3.4 GM/DL
GLUCOSE BLD-MCNC: 245 MG/DL (ref 70–110)
GLUCOSE UR STRIP-MCNC: NEGATIVE MG/DL
HCT VFR BLD AUTO: 33.7 % (ref 37–47)
HGB BLD-MCNC: 10.7 G/DL (ref 12–16)
HGB UR QL STRIP.AUTO: NEGATIVE
IMM GRANULOCYTES # BLD: 0 10*3/MM3 (ref 0–0.03)
IMM GRANULOCYTES NFR BLD: 0 % (ref 0–0.5)
KETONES UR QL STRIP: NEGATIVE
LEUKOCYTE ESTERASE UR QL STRIP.AUTO: NEGATIVE
LIPASE SERPL-CCNC: 50 U/L (ref 13–60)
LYMPHOCYTES # BLD AUTO: 1.56 10*3/MM3 (ref 1–3)
LYMPHOCYTES NFR BLD AUTO: 34.1 % (ref 21–51)
MCH RBC QN AUTO: 26.7 PG (ref 27–33)
MCHC RBC AUTO-ENTMCNC: 31.8 G/DL (ref 33–37)
MCV RBC AUTO: 84 FL (ref 80–94)
MDMA UR QL SCN: POSITIVE
METHADONE UR QL SCN: NEGATIVE
MONOCYTES # BLD AUTO: 0.32 10*3/MM3 (ref 0.1–0.9)
MONOCYTES NFR BLD AUTO: 7 % (ref 0–10)
NEUTROPHILS # BLD AUTO: 2.55 10*3/MM3 (ref 1.4–6.5)
NEUTROPHILS NFR BLD AUTO: 55.7 % (ref 30–70)
NITRITE UR QL STRIP: NEGATIVE
OPIATES UR QL: NEGATIVE
OSMOLALITY SERPL CALC.SUM OF ELEC: 290.3 MOSM/KG (ref 273–305)
OXYCODONE UR QL SCN: NEGATIVE
PCP UR QL SCN: NEGATIVE
PH UR STRIP.AUTO: 6 [PH] (ref 5–8)
PLATELET # BLD AUTO: 254 10*3/MM3 (ref 130–400)
PMV BLD AUTO: 9.1 FL (ref 6–10)
POTASSIUM BLD-SCNC: 3.5 MMOL/L (ref 3.5–5.3)
PROT SERPL-MCNC: 7.8 G/DL (ref 6–8)
PROT UR QL STRIP: NEGATIVE
RBC # BLD AUTO: 4.01 10*6/MM3 (ref 4.2–5.4)
SODIUM BLD-SCNC: 142 MMOL/L (ref 135–153)
SP GR UR STRIP: 1.02 (ref 1–1.03)
UROBILINOGEN UR QL STRIP: NORMAL
WBC NRBC COR # BLD: 4.58 10*3/MM3 (ref 4.5–12.5)

## 2017-06-21 PROCEDURE — 81003 URINALYSIS AUTO W/O SCOPE: CPT | Performed by: NURSE PRACTITIONER

## 2017-06-21 PROCEDURE — 80307 DRUG TEST PRSMV CHEM ANLYZR: CPT | Performed by: NURSE PRACTITIONER

## 2017-06-21 PROCEDURE — 25010000002 MORPHINE PER 10 MG: Performed by: EMERGENCY MEDICINE

## 2017-06-21 PROCEDURE — 74176 CT ABD & PELVIS W/O CONTRAST: CPT | Performed by: RADIOLOGY

## 2017-06-21 PROCEDURE — 96374 THER/PROPH/DIAG INJ IV PUSH: CPT

## 2017-06-21 PROCEDURE — 71020 HC CHEST PA AND LATERAL: CPT

## 2017-06-21 PROCEDURE — 85025 COMPLETE CBC W/AUTO DIFF WBC: CPT | Performed by: NURSE PRACTITIONER

## 2017-06-21 PROCEDURE — 74176 CT ABD & PELVIS W/O CONTRAST: CPT

## 2017-06-21 PROCEDURE — 82150 ASSAY OF AMYLASE: CPT | Performed by: NURSE PRACTITIONER

## 2017-06-21 PROCEDURE — 83690 ASSAY OF LIPASE: CPT | Performed by: NURSE PRACTITIONER

## 2017-06-21 PROCEDURE — 96361 HYDRATE IV INFUSION ADD-ON: CPT

## 2017-06-21 PROCEDURE — 81025 URINE PREGNANCY TEST: CPT | Performed by: NURSE PRACTITIONER

## 2017-06-21 PROCEDURE — 80053 COMPREHEN METABOLIC PANEL: CPT | Performed by: NURSE PRACTITIONER

## 2017-06-21 PROCEDURE — 71020 XR CHEST 2 VW: CPT | Performed by: RADIOLOGY

## 2017-06-21 RX ORDER — MORPHINE SULFATE 2 MG/ML
2 INJECTION, SOLUTION INTRAMUSCULAR; INTRAVENOUS ONCE
Status: COMPLETED | OUTPATIENT
Start: 2017-06-21 | End: 2017-06-21

## 2017-06-21 RX ADMIN — SODIUM CHLORIDE 1000 ML: 9 INJECTION, SOLUTION INTRAVENOUS at 00:11

## 2017-06-21 RX ADMIN — MORPHINE SULFATE 2 MG: 2 INJECTION, SOLUTION INTRAMUSCULAR; INTRAVENOUS at 00:45

## 2017-06-21 NOTE — ED PROVIDER NOTES
Subjective   Patient is a 32 y.o. female presenting with abdominal pain.   History provided by:  Patient  Abdominal Pain   Pain location:  RUQ  Pain quality: aching, sharp and shooting    Pain radiates to:  Epigastric region  Pain severity:  Mild  Onset quality:  Gradual  Timing:  Constant  Progression:  Worsening  Chronicity:  Chronic  Relieved by:  None tried  Ineffective treatments:  None tried  Associated symptoms: no chest pain, no dysuria, no fever, no hematuria, no nausea and no vomiting        Review of Systems   Constitutional: Negative.  Negative for fever.   HENT: Negative.    Respiratory: Negative.    Cardiovascular: Negative.  Negative for chest pain.   Gastrointestinal: Positive for abdominal pain. Negative for nausea and vomiting.   Endocrine: Negative.    Genitourinary: Negative.  Negative for dysuria and hematuria.   Skin: Negative.    Neurological: Negative.    Psychiatric/Behavioral: Negative.    All other systems reviewed and are negative.      Past Medical History:   Diagnosis Date   • Depression    • Diabetes mellitus    • DVT (deep venous thrombosis)    • GERD (gastroesophageal reflux disease)    • Gout    • Hyperlipidemia    • Hypertension    • Hypothyroid    • Kidney stone    • Migraine    • Neuropathy    • PE (pulmonary embolism)        Allergies   Allergen Reactions   • Amoxicillin    • Penicillins    • Toradol [Ketorolac Tromethamine]        Past Surgical History:   Procedure Laterality Date   • CARDIAC SURGERY      Heart Cath done in    •  SECTION     • CHOLECYSTECTOMY     • COLONOSCOPY         Family History   Problem Relation Age of Onset   • No Known Problems Mother    • No Known Problems Father    • No Known Problems Sister    • No Known Problems Brother    • No Known Problems Son    • No Known Problems Daughter    • No Known Problems Maternal Grandmother    • No Known Problems Maternal Grandfather    • No Known Problems Paternal Grandmother    • No Known Problems  Paternal Grandfather    • No Known Problems Cousin    • Rheum arthritis Neg Hx    • Osteoarthritis Neg Hx    • Asthma Neg Hx    • Diabetes Neg Hx    • Heart failure Neg Hx    • Hyperlipidemia Neg Hx    • Hypertension Neg Hx    • Migraines Neg Hx    • Rashes / Skin problems Neg Hx    • Seizures Neg Hx    • Stroke Neg Hx    • Thyroid disease Neg Hx        Social History     Social History   • Marital status:      Spouse name: N/A   • Number of children: N/A   • Years of education: N/A     Social History Main Topics   • Smoking status: Never Smoker   • Smokeless tobacco: None   • Alcohol use No   • Drug use: No   • Sexual activity: Defer     Other Topics Concern   • None     Social History Narrative   • None           Objective   Physical Exam   Constitutional: She is oriented to person, place, and time. She appears well-developed and well-nourished. No distress.   HENT:   Head: Normocephalic and atraumatic.   Right Ear: External ear normal.   Left Ear: External ear normal.   Nose: Nose normal.   Eyes: Conjunctivae and EOM are normal. Pupils are equal, round, and reactive to light.   Neck: Normal range of motion. Neck supple. No JVD present. No tracheal deviation present.   Cardiovascular: Normal rate, regular rhythm and normal heart sounds.    No murmur heard.  Pulmonary/Chest: Effort normal and breath sounds normal. No respiratory distress. She has no wheezes.   Abdominal: Soft. Bowel sounds are normal. There is no tenderness.   Musculoskeletal: Normal range of motion. She exhibits no edema or deformity.   Neurological: She is alert and oriented to person, place, and time. No cranial nerve deficit.   Skin: Skin is warm and dry. No rash noted. She is not diaphoretic. No erythema. No pallor.   Psychiatric: She has a normal mood and affect. Her behavior is normal. Thought content normal.   Nursing note and vitals reviewed.      Procedures         ED Course  ED Course   Value Comment By Time   CT Abdomen Pelvis  Stone Protocol The right adnexa is asymmetrically enlarge and hyperdense measuring 4.7CM, which could represent underlying hemorrhagic cyst.  -per [facility].   When going through records mass on right ovary has been there and unchanged.  Discussed results with patient, as she has had multiple ER visits for similar symptoms.  Suggested following up with GI specialist and GYN. Linda Pascal, APRN 06/21 0207                  MDM  Number of Diagnoses or Management Options  Adnexal mass: established and worsening  Chronic right upper quadrant pain: established and worsening     Amount and/or Complexity of Data Reviewed  Clinical lab tests: ordered and reviewed  Tests in the radiology section of CPT®: reviewed and ordered  Decide to obtain previous medical records or to obtain history from someone other than the patient: yes    Risk of Complications, Morbidity, and/or Mortality  Presenting problems: low  Diagnostic procedures: low  Management options: low    Patient Progress  Patient progress: improved      Final diagnoses:   Adnexal mass   Chronic right upper quadrant pain            Linda Pascal, APRN  06/21/17 0412

## 2017-06-21 NOTE — ED NOTES
Pt c/o right side/flank pain 10/10 on pain scale.  Linda Pascal NP notified.      Barbie Sarmiento RN  06/21/17 0023

## 2017-06-21 NOTE — ED NOTES
Pt return from ct. Pt c/o right flank/side pain. States pain medicine was not very effective.  Linda Pascal NP notified.      Barbie Sarmiento RN  06/21/17 0139

## 2017-07-02 ENCOUNTER — HOSPITAL ENCOUNTER (EMERGENCY)
Facility: HOSPITAL | Age: 32
Discharge: HOME | End: 2017-07-02
Attending: EMERGENCY MEDICINE | Admitting: EMERGENCY MEDICINE

## 2017-07-02 VITALS
WEIGHT: 293 LBS | DIASTOLIC BLOOD PRESSURE: 86 MMHG | HEART RATE: 72 BPM | HEIGHT: 64 IN | BODY MASS INDEX: 50.02 KG/M2 | TEMPERATURE: 97.9 F | OXYGEN SATURATION: 96 % | RESPIRATION RATE: 16 BRPM | SYSTOLIC BLOOD PRESSURE: 142 MMHG

## 2017-07-02 DIAGNOSIS — R10.9 FLANK PAIN: Primary | ICD-10-CM

## 2017-07-02 LAB
ALBUMIN SERPL-MCNC: 4 G/DL (ref 3.5–5)
ALBUMIN/GLOB SERPL: 1.2 G/DL (ref 1–2)
ALP SERPL-CCNC: 67 U/L (ref 38–126)
ALT SERPL W P-5'-P-CCNC: 70 U/L (ref 13–69)
ANION GAP SERPL CALCULATED.3IONS-SCNC: 16.1 MMOL/L
AST SERPL-CCNC: 74 U/L (ref 15–46)
B-HCG UR QL: NEGATIVE
BASOPHILS # BLD AUTO: 0.02 10*3/MM3 (ref 0–0.2)
BASOPHILS NFR BLD AUTO: 0.4 % (ref 0–2.5)
BILIRUB SERPL-MCNC: 0.5 MG/DL (ref 0.2–1.3)
BILIRUB UR QL STRIP: NEGATIVE
BUN BLD-MCNC: 12 MG/DL (ref 7–20)
BUN/CREAT SERPL: 20 (ref 7.1–23.5)
CALCIUM SPEC-SCNC: 9.5 MG/DL (ref 8.4–10.2)
CHLORIDE SERPL-SCNC: 104 MMOL/L (ref 98–107)
CLARITY UR: ABNORMAL
CO2 SERPL-SCNC: 25 MMOL/L (ref 26–30)
COLOR UR: YELLOW
CREAT BLD-MCNC: 0.6 MG/DL (ref 0.6–1.3)
DEPRECATED RDW RBC AUTO: 47.4 FL (ref 37–54)
EOSINOPHIL # BLD AUTO: 0.1 10*3/MM3 (ref 0–0.7)
EOSINOPHIL NFR BLD AUTO: 1.8 % (ref 0–7)
ERYTHROCYTE [DISTWIDTH] IN BLOOD BY AUTOMATED COUNT: 15.9 % (ref 11.5–14.5)
GFR SERPL CREATININE-BSD FRML MDRD: 117 ML/MIN/1.73
GLOBULIN UR ELPH-MCNC: 3.4 GM/DL
GLUCOSE BLD-MCNC: 180 MG/DL (ref 74–98)
GLUCOSE UR STRIP-MCNC: NEGATIVE MG/DL
HCT VFR BLD AUTO: 31.1 % (ref 37–47)
HGB BLD-MCNC: 10.1 G/DL (ref 12–16)
HGB UR QL STRIP.AUTO: NEGATIVE
IMM GRANULOCYTES # BLD: 0.02 10*3/MM3 (ref 0–0.06)
IMM GRANULOCYTES NFR BLD: 0.4 % (ref 0–0.6)
INR PPP: 4.52 (ref 0.9–1.1)
KETONES UR QL STRIP: NEGATIVE
LEUKOCYTE ESTERASE UR QL STRIP.AUTO: NEGATIVE
LIPASE SERPL-CCNC: 104 U/L (ref 23–300)
LYMPHOCYTES # BLD AUTO: 1.75 10*3/MM3 (ref 0.6–3.4)
LYMPHOCYTES NFR BLD AUTO: 32.1 % (ref 10–50)
MCH RBC QN AUTO: 26.6 PG (ref 27–31)
MCHC RBC AUTO-ENTMCNC: 32.5 G/DL (ref 30–37)
MCV RBC AUTO: 81.8 FL (ref 81–99)
MONOCYTES # BLD AUTO: 0.47 10*3/MM3 (ref 0–0.9)
MONOCYTES NFR BLD AUTO: 8.6 % (ref 0–12)
NEUTROPHILS # BLD AUTO: 3.1 10*3/MM3 (ref 2–6.9)
NEUTROPHILS NFR BLD AUTO: 56.7 % (ref 37–80)
NITRITE UR QL STRIP: NEGATIVE
NRBC BLD MANUAL-RTO: 0 /100 WBC (ref 0–0)
PH UR STRIP.AUTO: 7.5 [PH] (ref 5–8)
PLATELET # BLD AUTO: 212 10*3/MM3 (ref 130–400)
PMV BLD AUTO: 9 FL (ref 6–12)
POTASSIUM BLD-SCNC: 4.1 MMOL/L (ref 3.5–5.1)
PROT SERPL-MCNC: 7.4 G/DL (ref 6.3–8.2)
PROT UR QL STRIP: NEGATIVE
PROTHROMBIN TIME: 49.5 SECONDS (ref 9.3–12.1)
RBC # BLD AUTO: 3.8 10*6/MM3 (ref 4.2–5.4)
SODIUM BLD-SCNC: 141 MMOL/L (ref 137–145)
SP GR UR STRIP: 1.02 (ref 1–1.03)
UROBILINOGEN UR QL STRIP: ABNORMAL
WBC NRBC COR # BLD: 5.46 10*3/MM3 (ref 4.8–10.8)
WHOLE BLOOD HOLD SPECIMEN: NORMAL

## 2017-07-02 PROCEDURE — 96361 HYDRATE IV INFUSION ADD-ON: CPT

## 2017-07-02 PROCEDURE — 81025 URINE PREGNANCY TEST: CPT | Performed by: EMERGENCY MEDICINE

## 2017-07-02 PROCEDURE — 80053 COMPREHEN METABOLIC PANEL: CPT | Performed by: EMERGENCY MEDICINE

## 2017-07-02 PROCEDURE — 96374 THER/PROPH/DIAG INJ IV PUSH: CPT

## 2017-07-02 PROCEDURE — 85610 PROTHROMBIN TIME: CPT | Performed by: EMERGENCY MEDICINE

## 2017-07-02 PROCEDURE — 99283 EMERGENCY DEPT VISIT LOW MDM: CPT

## 2017-07-02 PROCEDURE — 85025 COMPLETE CBC W/AUTO DIFF WBC: CPT | Performed by: EMERGENCY MEDICINE

## 2017-07-02 PROCEDURE — 81003 URINALYSIS AUTO W/O SCOPE: CPT

## 2017-07-02 PROCEDURE — 96375 TX/PRO/DX INJ NEW DRUG ADDON: CPT

## 2017-07-02 PROCEDURE — 83690 ASSAY OF LIPASE: CPT | Performed by: EMERGENCY MEDICINE

## 2017-07-02 PROCEDURE — 25010000002 ONDANSETRON PER 1 MG: Performed by: EMERGENCY MEDICINE

## 2017-07-02 PROCEDURE — 25010000002 HALOPERIDOL LACTATE PER 5 MG: Performed by: EMERGENCY MEDICINE

## 2017-07-02 RX ORDER — ACETAMINOPHEN 500 MG
1000 TABLET ORAL ONCE
Status: DISCONTINUED | OUTPATIENT
Start: 2017-07-02 | End: 2017-07-02 | Stop reason: HOSPADM

## 2017-07-02 RX ORDER — LIDOCAINE HYDROCHLORIDE 20 MG/ML
12 INJECTION, SOLUTION EPIDURAL; INFILTRATION; INTRACAUDAL; PERINEURAL ONCE
Status: COMPLETED | OUTPATIENT
Start: 2017-07-02 | End: 2017-07-02

## 2017-07-02 RX ORDER — MELOXICAM 15 MG/1
15 TABLET ORAL DAILY
Qty: 20 TABLET | Refills: 0 | Status: SHIPPED | OUTPATIENT
Start: 2017-07-02 | End: 2019-04-25

## 2017-07-02 RX ORDER — HALOPERIDOL 5 MG/ML
2.5 INJECTION INTRAMUSCULAR ONCE
Status: COMPLETED | OUTPATIENT
Start: 2017-07-02 | End: 2017-07-02

## 2017-07-02 RX ORDER — ONDANSETRON 2 MG/ML
4 INJECTION INTRAMUSCULAR; INTRAVENOUS ONCE
Status: COMPLETED | OUTPATIENT
Start: 2017-07-02 | End: 2017-07-02

## 2017-07-02 RX ADMIN — HALOPERIDOL LACTATE 2.5 MG: 5 INJECTION, SOLUTION INTRAMUSCULAR at 08:23

## 2017-07-02 RX ADMIN — ONDANSETRON 4 MG: 2 INJECTION INTRAMUSCULAR; INTRAVENOUS at 06:51

## 2017-07-02 RX ADMIN — LIDOCAINE HYDROCHLORIDE 12 ML: 20 INJECTION, SOLUTION EPIDURAL; INFILTRATION; INTRACAUDAL; PERINEURAL at 07:46

## 2017-07-02 RX ADMIN — SODIUM CHLORIDE 1000 ML: 9 INJECTION, SOLUTION INTRAVENOUS at 06:50

## 2017-07-02 NOTE — DISCHARGE INSTRUCTIONS
Hold your warfarin for 3 days.  You will need to have your INR rechecked with your primary care provider and then plans can be made for how to resume.

## 2017-07-02 NOTE — ED NOTES
Patient states taking tylenol 600mg 3 hrs ago, Tylenol dose ordered not administered. Dr. Kruse notified.     Hermelinda Jalloh, RN  07/02/17 0656

## 2017-07-02 NOTE — ED PROVIDER NOTES
Subjective   HPI Comments: 32-year-old female presenting with flank pain.  She states that for the last days she's had severe right flank pain that radiates into her right groin.  There've been no alleviating or aggravating factors.  Pain is described as sharp.  She also has some dysuria, nausea, vomiting.  She denies any fevers, chills, abdominal pain, shortness of breath, chest pain.  She states this feels similar to kidney stones that she's had in the past.  Of note she was worked up just over a week ago for the same complaints and an outside emergency department.  At that time CT scan showed a known right ovarian cyst.      Review of Systems   Constitutional: Negative for chills and fever.   HENT: Negative for congestion, rhinorrhea and sore throat.    Eyes: Negative for pain.   Respiratory: Negative for cough and shortness of breath.    Cardiovascular: Negative for chest pain, palpitations and leg swelling.   Gastrointestinal: Positive for nausea and vomiting. Negative for abdominal pain and diarrhea.   Genitourinary: Positive for dysuria, flank pain and frequency. Negative for hematuria, vaginal bleeding and vaginal discharge.   Musculoskeletal: Negative for arthralgias.   Skin: Negative for rash.   Neurological: Negative for weakness and numbness.   Psychiatric/Behavioral: Negative for behavioral problems.       Past Medical History:   Diagnosis Date   • Depression    • Diabetes mellitus    • DVT (deep venous thrombosis)    • GERD (gastroesophageal reflux disease)    • Gout    • Hyperlipidemia    • Hypertension    • Hypothyroid    • Kidney stone    • Migraine    • Neuropathy    • PE (pulmonary embolism)        Allergies   Allergen Reactions   • Amoxicillin    • Penicillins    • Toradol [Ketorolac Tromethamine]        Past Surgical History:   Procedure Laterality Date   • CARDIAC SURGERY      Heart Cath done in    •  SECTION     • CHOLECYSTECTOMY     • COLONOSCOPY         Family History   Problem  Relation Age of Onset   • No Known Problems Mother    • No Known Problems Father    • No Known Problems Sister    • No Known Problems Brother    • No Known Problems Son    • No Known Problems Daughter    • No Known Problems Maternal Grandmother    • No Known Problems Maternal Grandfather    • No Known Problems Paternal Grandmother    • No Known Problems Paternal Grandfather    • No Known Problems Cousin    • Rheum arthritis Neg Hx    • Osteoarthritis Neg Hx    • Asthma Neg Hx    • Diabetes Neg Hx    • Heart failure Neg Hx    • Hyperlipidemia Neg Hx    • Hypertension Neg Hx    • Migraines Neg Hx    • Rashes / Skin problems Neg Hx    • Seizures Neg Hx    • Stroke Neg Hx    • Thyroid disease Neg Hx        Social History     Social History   • Marital status:      Spouse name: N/A   • Number of children: N/A   • Years of education: N/A     Social History Main Topics   • Smoking status: Never Smoker   • Smokeless tobacco: None   • Alcohol use No   • Drug use: No   • Sexual activity: Defer     Other Topics Concern   • None     Social History Narrative           Objective   Physical Exam   Constitutional: She is oriented to person, place, and time. She appears well-developed and well-nourished. No distress.   HENT:   Head: Normocephalic and atraumatic.   Right Ear: External ear normal.   Left Ear: External ear normal.   Nose: Nose normal.   Mouth/Throat: Oropharynx is clear and moist.   Eyes: Conjunctivae and EOM are normal. Pupils are equal, round, and reactive to light.   Neck: Normal range of motion. Neck supple.   Cardiovascular: Normal rate, regular rhythm, normal heart sounds and intact distal pulses.    Pulmonary/Chest: Effort normal and breath sounds normal. No respiratory distress.   Abdominal: Soft. Bowel sounds are normal. She exhibits no distension. There is no tenderness. There is no rebound and no guarding.   Very difficult exam due to habitus, no tenderness   Musculoskeletal: Normal range of motion.  She exhibits no edema, tenderness or deformity.   No CVA tenderness   Neurological: She is alert and oriented to person, place, and time.   Skin: Skin is warm and dry. No rash noted.   Psychiatric: She has a normal mood and affect. Her behavior is normal.   Nursing note and vitals reviewed.      Procedures         ED Course  ED Course   Comment By Time   I assumed care at change of shift.  Laboratories show no significant abnormalities with exception to an INR of 4.52.  Patient was treated with more pain medication with moderate symptom control.  Patient did request to go home afterwards.  Did discuss plan for holding Coumadin for 3 days with the patient instruct her follow with her primary care provider to come up with a plan to resume taking it. Neo Bautista MD 07/02 4295                  MDM  Number of Diagnoses or Management Options  Diagnosis management comments: 32-year-old female with flank pain and dysuria.  Well-developed, well-nourished morbidly obese female in no distress with normal vital signs and nonfocal exam.  History is more consistent with an infection rather than a stone.  Of note she's had 6 CT scans already this year, only one of which showed evidence of a possible stone.  At this time I do not feel she warrants a another CT scan.  We'll check labs, UA as well as treat symptomatically.  Disposition pending workup.    DDX: UTI, pyelonephritis, stone, electrolyte abnormality, musculoskeletal pain, chronic pain      Final diagnoses:   Flank pain            Ted Kruse MD  07/04/17 6721

## 2017-07-11 ENCOUNTER — HOSPITAL ENCOUNTER (EMERGENCY)
Facility: HOSPITAL | Age: 32
Discharge: LEFT BEFORE DISCHARGE | End: 2017-07-12

## 2017-07-11 PROCEDURE — 99211 OFF/OP EST MAY X REQ PHY/QHP: CPT

## 2017-07-12 VITALS
BODY MASS INDEX: 50.02 KG/M2 | HEIGHT: 64 IN | WEIGHT: 293 LBS | DIASTOLIC BLOOD PRESSURE: 88 MMHG | HEART RATE: 80 BPM | TEMPERATURE: 98 F | RESPIRATION RATE: 16 BRPM | SYSTOLIC BLOOD PRESSURE: 132 MMHG | OXYGEN SATURATION: 94 %

## 2017-07-15 ENCOUNTER — HOSPITAL ENCOUNTER (EMERGENCY)
Facility: HOSPITAL | Age: 32
Discharge: LEFT BEFORE DISCHARGE | End: 2017-07-15

## 2017-07-15 ENCOUNTER — HOSPITAL ENCOUNTER (EMERGENCY)
Facility: HOSPITAL | Age: 32
Discharge: LEFT BEFORE DISCHARGE | End: 2017-07-15
Attending: STUDENT IN AN ORGANIZED HEALTH CARE EDUCATION/TRAINING PROGRAM | Admitting: STUDENT IN AN ORGANIZED HEALTH CARE EDUCATION/TRAINING PROGRAM

## 2017-07-15 VITALS
BODY MASS INDEX: 50.02 KG/M2 | TEMPERATURE: 98.2 F | SYSTOLIC BLOOD PRESSURE: 129 MMHG | DIASTOLIC BLOOD PRESSURE: 76 MMHG | WEIGHT: 293 LBS | OXYGEN SATURATION: 96 % | HEIGHT: 64 IN | RESPIRATION RATE: 18 BRPM | HEART RATE: 72 BPM

## 2017-07-15 VITALS
DIASTOLIC BLOOD PRESSURE: 81 MMHG | SYSTOLIC BLOOD PRESSURE: 143 MMHG | WEIGHT: 293 LBS | HEART RATE: 74 BPM | TEMPERATURE: 98.5 F | OXYGEN SATURATION: 96 % | BODY MASS INDEX: 50.02 KG/M2 | HEIGHT: 64 IN | RESPIRATION RATE: 18 BRPM

## 2017-07-15 DIAGNOSIS — R10.9 RIGHT FLANK PAIN: Primary | ICD-10-CM

## 2017-07-15 LAB
ALBUMIN SERPL-MCNC: 4.2 G/DL (ref 3.5–5)
ALBUMIN/GLOB SERPL: 1.2 G/DL (ref 1–2)
ALP SERPL-CCNC: 77 U/L (ref 38–126)
ALT SERPL W P-5'-P-CCNC: 56 U/L (ref 13–69)
AMORPH URATE CRY URNS QL MICRO: ABNORMAL /HPF
ANION GAP SERPL CALCULATED.3IONS-SCNC: 17.8 MMOL/L
AST SERPL-CCNC: 73 U/L (ref 15–46)
BACTERIA UR QL AUTO: ABNORMAL /HPF
BASOPHILS # BLD AUTO: 0.02 10*3/MM3 (ref 0–0.2)
BASOPHILS NFR BLD AUTO: 0.3 % (ref 0–2.5)
BILIRUB SERPL-MCNC: 0.5 MG/DL (ref 0.2–1.3)
BILIRUB UR QL STRIP: NEGATIVE
BUN BLD-MCNC: 11 MG/DL (ref 7–20)
BUN/CREAT SERPL: 18.3 (ref 7.1–23.5)
CALCIUM SPEC-SCNC: 9.8 MG/DL (ref 8.4–10.2)
CHLORIDE SERPL-SCNC: 103 MMOL/L (ref 98–107)
CLARITY UR: ABNORMAL
CO2 SERPL-SCNC: 24 MMOL/L (ref 26–30)
COLOR UR: ABNORMAL
CREAT BLD-MCNC: 0.6 MG/DL (ref 0.6–1.3)
DEPRECATED RDW RBC AUTO: 47.4 FL (ref 37–54)
EOSINOPHIL # BLD AUTO: 0.11 10*3/MM3 (ref 0–0.7)
EOSINOPHIL NFR BLD AUTO: 1.9 % (ref 0–7)
ERYTHROCYTE [DISTWIDTH] IN BLOOD BY AUTOMATED COUNT: 16.2 % (ref 11.5–14.5)
GFR SERPL CREATININE-BSD FRML MDRD: 117 ML/MIN/1.73
GLOBULIN UR ELPH-MCNC: 3.4 GM/DL
GLUCOSE BLD-MCNC: 157 MG/DL (ref 74–98)
GLUCOSE UR STRIP-MCNC: NEGATIVE MG/DL
HCT VFR BLD AUTO: 33.5 % (ref 37–47)
HGB BLD-MCNC: 10.8 G/DL (ref 12–16)
HGB UR QL STRIP.AUTO: ABNORMAL
HYALINE CASTS UR QL AUTO: ABNORMAL /LPF
IMM GRANULOCYTES # BLD: 0.02 10*3/MM3 (ref 0–0.06)
IMM GRANULOCYTES NFR BLD: 0.3 % (ref 0–0.6)
INR PPP: 3.65 (ref 0.9–1.1)
KETONES UR QL STRIP: NEGATIVE
LEUKOCYTE ESTERASE UR QL STRIP.AUTO: NEGATIVE
LYMPHOCYTES # BLD AUTO: 1.9 10*3/MM3 (ref 0.6–3.4)
LYMPHOCYTES NFR BLD AUTO: 32.6 % (ref 10–50)
MCH RBC QN AUTO: 26.3 PG (ref 27–31)
MCHC RBC AUTO-ENTMCNC: 32.2 G/DL (ref 30–37)
MCV RBC AUTO: 81.5 FL (ref 81–99)
MONOCYTES # BLD AUTO: 0.46 10*3/MM3 (ref 0–0.9)
MONOCYTES NFR BLD AUTO: 7.9 % (ref 0–12)
NEUTROPHILS # BLD AUTO: 3.31 10*3/MM3 (ref 2–6.9)
NEUTROPHILS NFR BLD AUTO: 57 % (ref 37–80)
NITRITE UR QL STRIP: NEGATIVE
NRBC BLD MANUAL-RTO: 0 /100 WBC (ref 0–0)
PH UR STRIP.AUTO: 7.5 [PH] (ref 5–8)
PLATELET # BLD AUTO: 241 10*3/MM3 (ref 130–400)
PMV BLD AUTO: 8.7 FL (ref 6–12)
POTASSIUM BLD-SCNC: 3.8 MMOL/L (ref 3.5–5.1)
PROT SERPL-MCNC: 7.6 G/DL (ref 6.3–8.2)
PROT UR QL STRIP: ABNORMAL
PROTHROMBIN TIME: 40 SECONDS (ref 9.3–12.1)
RBC # BLD AUTO: 4.11 10*6/MM3 (ref 4.2–5.4)
RBC # UR: ABNORMAL /HPF
REF LAB TEST METHOD: ABNORMAL
SODIUM BLD-SCNC: 141 MMOL/L (ref 137–145)
SP GR UR STRIP: 1.02 (ref 1–1.03)
SQUAMOUS #/AREA URNS HPF: ABNORMAL /HPF
UROBILINOGEN UR QL STRIP: ABNORMAL
WBC NRBC COR # BLD: 5.82 10*3/MM3 (ref 4.8–10.8)
WBC UR QL AUTO: ABNORMAL /HPF

## 2017-07-15 PROCEDURE — 85025 COMPLETE CBC W/AUTO DIFF WBC: CPT | Performed by: STUDENT IN AN ORGANIZED HEALTH CARE EDUCATION/TRAINING PROGRAM

## 2017-07-15 PROCEDURE — 99283 EMERGENCY DEPT VISIT LOW MDM: CPT

## 2017-07-15 PROCEDURE — 96361 HYDRATE IV INFUSION ADD-ON: CPT

## 2017-07-15 PROCEDURE — 25010000002 HALOPERIDOL LACTATE PER 5 MG: Performed by: STUDENT IN AN ORGANIZED HEALTH CARE EDUCATION/TRAINING PROGRAM

## 2017-07-15 PROCEDURE — 96374 THER/PROPH/DIAG INJ IV PUSH: CPT

## 2017-07-15 PROCEDURE — 81001 URINALYSIS AUTO W/SCOPE: CPT | Performed by: STUDENT IN AN ORGANIZED HEALTH CARE EDUCATION/TRAINING PROGRAM

## 2017-07-15 PROCEDURE — 80053 COMPREHEN METABOLIC PANEL: CPT | Performed by: STUDENT IN AN ORGANIZED HEALTH CARE EDUCATION/TRAINING PROGRAM

## 2017-07-15 PROCEDURE — 85610 PROTHROMBIN TIME: CPT | Performed by: STUDENT IN AN ORGANIZED HEALTH CARE EDUCATION/TRAINING PROGRAM

## 2017-07-15 PROCEDURE — 99211 OFF/OP EST MAY X REQ PHY/QHP: CPT

## 2017-07-15 RX ORDER — LIDOCAINE HYDROCHLORIDE 20 MG/ML
12 INJECTION, SOLUTION EPIDURAL; INFILTRATION; INTRACAUDAL; PERINEURAL ONCE
Status: DISCONTINUED | OUTPATIENT
Start: 2017-07-15 | End: 2017-07-15 | Stop reason: HOSPADM

## 2017-07-15 RX ORDER — HALOPERIDOL 5 MG/ML
2.5 INJECTION INTRAMUSCULAR ONCE
Status: COMPLETED | OUTPATIENT
Start: 2017-07-15 | End: 2017-07-15

## 2017-07-15 RX ADMIN — HALOPERIDOL LACTATE 2.5 MG: 5 INJECTION, SOLUTION INTRAMUSCULAR at 20:34

## 2017-07-15 RX ADMIN — SODIUM CHLORIDE 1000 ML: 9 INJECTION, SOLUTION INTRAVENOUS at 20:21

## 2017-07-15 NOTE — ED NOTES
Patient sitting in lobby with daughter, no acute distress noted.      Latisha Valle, RN  07/15/17 4104

## 2017-07-15 NOTE — ED NOTES
Patient at nurses station reports the wait is to long and she is ready for bed, informed patient of the risks of signing out AMA patient verbalized understanding, patient signed LWBS paperwork, patient accompanied by family, patient alert and oriented x3 no acute distress noted.       Latisha Valle RN  07/15/17 8466

## 2017-07-16 NOTE — ED PROVIDER NOTES
Subjective   HPI Comments: Patient's 32-year-old female who presents with right-sided flank pain ×2 days.  Patient states she does have a history of kidney stones.  She also is on Coumadin for history of DVT and PE.  Flank pain is described as severe, constant, feels like someone is stabbing her, and nothing makes better or worse.      Review of Systems   All other systems reviewed and are negative.      Past Medical History:   Diagnosis Date   • Depression    • Diabetes mellitus    • DVT (deep venous thrombosis)    • GERD (gastroesophageal reflux disease)    • Gout    • Hyperlipidemia    • Hypertension    • Hypothyroid    • Kidney stone    • Migraine    • Neuropathy    • PE (pulmonary embolism)        Allergies   Allergen Reactions   • Amoxicillin    • Penicillins    • Toradol [Ketorolac Tromethamine]        Past Surgical History:   Procedure Laterality Date   • CARDIAC SURGERY      Heart Cath done in    •  SECTION     • CHOLECYSTECTOMY     • COLONOSCOPY         Family History   Problem Relation Age of Onset   • No Known Problems Mother    • No Known Problems Father    • No Known Problems Sister    • No Known Problems Brother    • No Known Problems Son    • No Known Problems Daughter    • No Known Problems Maternal Grandmother    • No Known Problems Maternal Grandfather    • No Known Problems Paternal Grandmother    • No Known Problems Paternal Grandfather    • No Known Problems Cousin    • Rheum arthritis Neg Hx    • Osteoarthritis Neg Hx    • Asthma Neg Hx    • Diabetes Neg Hx    • Heart failure Neg Hx    • Hyperlipidemia Neg Hx    • Hypertension Neg Hx    • Migraines Neg Hx    • Rashes / Skin problems Neg Hx    • Seizures Neg Hx    • Stroke Neg Hx    • Thyroid disease Neg Hx        Social History     Social History   • Marital status:      Spouse name: N/A   • Number of children: N/A   • Years of education: N/A     Social History Main Topics   • Smoking status: Never Smoker   • Smokeless  tobacco: None   • Alcohol use No   • Drug use: No   • Sexual activity: Defer     Other Topics Concern   • None     Social History Narrative           Objective   Physical Exam   Nursing note and vitals reviewed.    GEN: No acute distress, morbidly obese  Head: Normocephalic, atraumatic  Eyes: Pupils equal round reactive to light  ENT: Posterior pharynx normal in appearance, oral mucosa is moist  Chest: Nontender to palpation  Cardiovascular: Regular rate  Lungs: Clear to auscultation bilaterally  Abdomen: Soft, nontender, nondistended, no peritoneal signs  Extremities: No edema, normal appearance  Neuro: GCS 15  Psych: Mood and affect are appropriate    Procedures         ED Course  ED Course                  MDM  Number of Diagnoses or Management Options  Right flank pain:   Diagnosis management comments: Differential diagnosis would include kidney stone, pyelonephritis, musculoskeletal back pain, AAA, aortic dissection, or other concerns.  Patient was offered treatment with IV lidocaine for which she refused.   Patient states that it is not helping her CT scan not performed due to the fact the patient has had 6 CT scans so far this year  Patient signed out AMA whenever she found out she was not going to be receiving narcotics.         Amount and/or Complexity of Data Reviewed  Clinical lab tests: ordered and reviewed        Final diagnoses:   Right flank pain            Neo Bautista MD  07/15/17 3171

## 2017-07-19 ENCOUNTER — HOSPITAL ENCOUNTER (EMERGENCY)
Facility: HOSPITAL | Age: 32
Discharge: LEFT BEFORE DISCHARGE | End: 2017-07-19
Attending: EMERGENCY MEDICINE

## 2017-07-19 VITALS
TEMPERATURE: 97.5 F | BODY MASS INDEX: 50.02 KG/M2 | OXYGEN SATURATION: 97 % | HEART RATE: 63 BPM | HEIGHT: 64 IN | DIASTOLIC BLOOD PRESSURE: 98 MMHG | SYSTOLIC BLOOD PRESSURE: 169 MMHG | WEIGHT: 293 LBS | RESPIRATION RATE: 18 BRPM

## 2017-07-19 PROCEDURE — 99211 OFF/OP EST MAY X REQ PHY/QHP: CPT

## 2017-07-19 NOTE — ED NOTES
Pt not in room at this time. Pt searched for in all waiting areas, outside ER doors, and near vending machines; pt not found     Georgie Barksdale RN  07/19/17 7119

## 2017-07-19 NOTE — ED NOTES
Went in to see pt to complete assessment, pt not in room. Lead nurse and provider made aware     Georgie Barksdale RN  07/19/17 0416       Georgie Barksdale RN  07/19/17 0433

## 2017-07-20 ENCOUNTER — APPOINTMENT (OUTPATIENT)
Dept: CT IMAGING | Facility: HOSPITAL | Age: 32
End: 2017-07-20

## 2017-07-20 ENCOUNTER — HOSPITAL ENCOUNTER (EMERGENCY)
Facility: HOSPITAL | Age: 32
Discharge: HOME | End: 2017-07-20
Attending: EMERGENCY MEDICINE | Admitting: EMERGENCY MEDICINE

## 2017-07-20 ENCOUNTER — APPOINTMENT (OUTPATIENT)
Dept: ULTRASOUND IMAGING | Facility: HOSPITAL | Age: 32
End: 2017-07-20

## 2017-07-20 VITALS
DIASTOLIC BLOOD PRESSURE: 78 MMHG | OXYGEN SATURATION: 95 % | RESPIRATION RATE: 18 BRPM | SYSTOLIC BLOOD PRESSURE: 111 MMHG | HEIGHT: 64 IN | HEART RATE: 67 BPM | TEMPERATURE: 98.2 F | WEIGHT: 293 LBS | BODY MASS INDEX: 50.02 KG/M2

## 2017-07-20 DIAGNOSIS — R10.30 LOWER ABDOMINAL PAIN: Primary | ICD-10-CM

## 2017-07-20 DIAGNOSIS — N83.201 CYST OF RIGHT OVARY: ICD-10-CM

## 2017-07-20 LAB
ALBUMIN SERPL-MCNC: 4.4 G/DL (ref 3.2–4.8)
ALBUMIN/GLOB SERPL: 1.4 G/DL (ref 1.5–2.5)
ALP SERPL-CCNC: 76 U/L (ref 25–100)
ALT SERPL W P-5'-P-CCNC: 44 U/L (ref 7–40)
ANION GAP SERPL CALCULATED.3IONS-SCNC: 10 MMOL/L (ref 3–11)
AST SERPL-CCNC: 53 U/L (ref 0–33)
B-HCG UR QL: NEGATIVE
B-HCG UR QL: NEGATIVE
BACTERIA UR QL AUTO: ABNORMAL /HPF
BASOPHILS # BLD AUTO: 0.03 10*3/MM3 (ref 0–0.2)
BASOPHILS NFR BLD AUTO: 0.6 % (ref 0–1)
BILIRUB SERPL-MCNC: 0.3 MG/DL (ref 0.3–1.2)
BILIRUB UR QL STRIP: NEGATIVE
BUN BLD-MCNC: 7 MG/DL (ref 9–23)
BUN/CREAT SERPL: 11.7 (ref 7–25)
CALCIUM SPEC-SCNC: 9.8 MG/DL (ref 8.7–10.4)
CHLORIDE SERPL-SCNC: 102 MMOL/L (ref 99–109)
CLARITY UR: ABNORMAL
CO2 SERPL-SCNC: 25 MMOL/L (ref 20–31)
COLOR UR: ABNORMAL
CREAT BLD-MCNC: 0.6 MG/DL (ref 0.6–1.3)
DEPRECATED RDW RBC AUTO: 49.4 FL (ref 37–54)
EOSINOPHIL # BLD AUTO: 0.13 10*3/MM3 (ref 0–0.3)
EOSINOPHIL NFR BLD AUTO: 2.4 % (ref 0–3)
ERYTHROCYTE [DISTWIDTH] IN BLOOD BY AUTOMATED COUNT: 16.1 % (ref 11.3–14.5)
GFR SERPL CREATININE-BSD FRML MDRD: 117 ML/MIN/1.73
GLOBULIN UR ELPH-MCNC: 3.2 GM/DL
GLUCOSE BLD-MCNC: 157 MG/DL (ref 70–100)
GLUCOSE UR STRIP-MCNC: NEGATIVE MG/DL
HCT VFR BLD AUTO: 33.8 % (ref 34.5–44)
HGB BLD-MCNC: 10.6 G/DL (ref 11.5–15.5)
HGB UR QL STRIP.AUTO: ABNORMAL
HOLD SPECIMEN: NORMAL
HOLD SPECIMEN: NORMAL
HYALINE CASTS UR QL AUTO: ABNORMAL /LPF
IMM GRANULOCYTES # BLD: 0.01 10*3/MM3 (ref 0–0.03)
IMM GRANULOCYTES NFR BLD: 0.2 % (ref 0–0.6)
INR PPP: 2.82
INTERNAL NEGATIVE CONTROL: NEGATIVE
INTERNAL POSITIVE CONTROL: POSITIVE
KETONES UR QL STRIP: ABNORMAL
LEUKOCYTE ESTERASE UR QL STRIP.AUTO: ABNORMAL
LIPASE SERPL-CCNC: 40 U/L (ref 6–51)
LYMPHOCYTES # BLD AUTO: 2.01 10*3/MM3 (ref 0.6–4.8)
LYMPHOCYTES NFR BLD AUTO: 37.4 % (ref 24–44)
Lab: NORMAL
MCH RBC QN AUTO: 26.5 PG (ref 27–31)
MCHC RBC AUTO-ENTMCNC: 31.4 G/DL (ref 32–36)
MCV RBC AUTO: 84.5 FL (ref 80–99)
MONOCYTES # BLD AUTO: 0.45 10*3/MM3 (ref 0–1)
MONOCYTES NFR BLD AUTO: 8.4 % (ref 0–12)
NEUTROPHILS # BLD AUTO: 2.74 10*3/MM3 (ref 1.5–8.3)
NEUTROPHILS NFR BLD AUTO: 51 % (ref 41–71)
NITRITE UR QL STRIP: NEGATIVE
PH UR STRIP.AUTO: 7 [PH] (ref 5–8)
PLATELET # BLD AUTO: 232 10*3/MM3 (ref 150–450)
PMV BLD AUTO: 9.2 FL (ref 6–12)
POTASSIUM BLD-SCNC: 4 MMOL/L (ref 3.5–5.5)
PROT SERPL-MCNC: 7.6 G/DL (ref 5.7–8.2)
PROT UR QL STRIP: ABNORMAL
PROTHROMBIN TIME: 31.7 SECONDS (ref 9.6–11.5)
RBC # BLD AUTO: 4 10*6/MM3 (ref 3.89–5.14)
RBC # UR: ABNORMAL /HPF
REF LAB TEST METHOD: ABNORMAL
SODIUM BLD-SCNC: 137 MMOL/L (ref 132–146)
SP GR UR STRIP: 1.02 (ref 1–1.03)
SQUAMOUS #/AREA URNS HPF: ABNORMAL /HPF
UROBILINOGEN UR QL STRIP: ABNORMAL
WBC NRBC COR # BLD: 5.37 10*3/MM3 (ref 3.5–10.8)
WBC UR QL AUTO: ABNORMAL /HPF
WHOLE BLOOD HOLD SPECIMEN: NORMAL
WHOLE BLOOD HOLD SPECIMEN: NORMAL

## 2017-07-20 PROCEDURE — 25010000002 ONDANSETRON PER 1 MG: Performed by: EMERGENCY MEDICINE

## 2017-07-20 PROCEDURE — 83690 ASSAY OF LIPASE: CPT | Performed by: EMERGENCY MEDICINE

## 2017-07-20 PROCEDURE — 99284 EMERGENCY DEPT VISIT MOD MDM: CPT

## 2017-07-20 PROCEDURE — 85025 COMPLETE CBC W/AUTO DIFF WBC: CPT | Performed by: EMERGENCY MEDICINE

## 2017-07-20 PROCEDURE — 76830 TRANSVAGINAL US NON-OB: CPT

## 2017-07-20 PROCEDURE — 80053 COMPREHEN METABOLIC PANEL: CPT | Performed by: EMERGENCY MEDICINE

## 2017-07-20 PROCEDURE — 96376 TX/PRO/DX INJ SAME DRUG ADON: CPT

## 2017-07-20 PROCEDURE — 74176 CT ABD & PELVIS W/O CONTRAST: CPT

## 2017-07-20 PROCEDURE — 85610 PROTHROMBIN TIME: CPT | Performed by: EMERGENCY MEDICINE

## 2017-07-20 PROCEDURE — 87086 URINE CULTURE/COLONY COUNT: CPT | Performed by: EMERGENCY MEDICINE

## 2017-07-20 PROCEDURE — 96361 HYDRATE IV INFUSION ADD-ON: CPT

## 2017-07-20 PROCEDURE — 96375 TX/PRO/DX INJ NEW DRUG ADDON: CPT

## 2017-07-20 PROCEDURE — 81025 URINE PREGNANCY TEST: CPT | Performed by: EMERGENCY MEDICINE

## 2017-07-20 PROCEDURE — 81001 URINALYSIS AUTO W/SCOPE: CPT | Performed by: EMERGENCY MEDICINE

## 2017-07-20 PROCEDURE — 25010000002 HYDROMORPHONE PER 4 MG: Performed by: EMERGENCY MEDICINE

## 2017-07-20 PROCEDURE — 96374 THER/PROPH/DIAG INJ IV PUSH: CPT

## 2017-07-20 RX ORDER — HYDROMORPHONE HYDROCHLORIDE 1 MG/ML
0.5 INJECTION, SOLUTION INTRAMUSCULAR; INTRAVENOUS; SUBCUTANEOUS ONCE
Status: COMPLETED | OUTPATIENT
Start: 2017-07-20 | End: 2017-07-20

## 2017-07-20 RX ORDER — SODIUM CHLORIDE 0.9 % (FLUSH) 0.9 %
10 SYRINGE (ML) INJECTION AS NEEDED
Status: DISCONTINUED | OUTPATIENT
Start: 2017-07-20 | End: 2017-07-20 | Stop reason: HOSPADM

## 2017-07-20 RX ORDER — ONDANSETRON 2 MG/ML
4 INJECTION INTRAMUSCULAR; INTRAVENOUS ONCE
Status: COMPLETED | OUTPATIENT
Start: 2017-07-20 | End: 2017-07-20

## 2017-07-20 RX ORDER — HYDROCODONE BITARTRATE AND ACETAMINOPHEN 5; 325 MG/1; MG/1
1 TABLET ORAL EVERY 6 HOURS PRN
Qty: 10 TABLET | Refills: 0 | Status: SHIPPED | OUTPATIENT
Start: 2017-07-20 | End: 2017-09-18

## 2017-07-20 RX ADMIN — ONDANSETRON 4 MG: 2 INJECTION INTRAMUSCULAR; INTRAVENOUS at 11:09

## 2017-07-20 RX ADMIN — ONDANSETRON 4 MG: 2 INJECTION INTRAMUSCULAR; INTRAVENOUS at 16:45

## 2017-07-20 RX ADMIN — HYDROMORPHONE HYDROCHLORIDE 0.5 MG: 1 INJECTION, SOLUTION INTRAMUSCULAR; INTRAVENOUS; SUBCUTANEOUS at 11:10

## 2017-07-20 RX ADMIN — SODIUM CHLORIDE 500 ML: 9 INJECTION, SOLUTION INTRAVENOUS at 11:11

## 2017-07-20 RX ADMIN — HYDROMORPHONE HYDROCHLORIDE 0.5 MG: 1 INJECTION, SOLUTION INTRAMUSCULAR; INTRAVENOUS; SUBCUTANEOUS at 14:50

## 2017-07-20 RX ADMIN — HYDROMORPHONE HYDROCHLORIDE 0.5 MG: 1 INJECTION, SOLUTION INTRAMUSCULAR; INTRAVENOUS; SUBCUTANEOUS at 13:14

## 2017-07-20 RX ADMIN — HYDROMORPHONE HYDROCHLORIDE 0.5 MG: 1 INJECTION, SOLUTION INTRAMUSCULAR; INTRAVENOUS; SUBCUTANEOUS at 12:15

## 2017-07-20 RX ADMIN — HYDROMORPHONE HYDROCHLORIDE 0.5 MG: 1 INJECTION, SOLUTION INTRAMUSCULAR; INTRAVENOUS; SUBCUTANEOUS at 16:45

## 2017-07-20 NOTE — DISCHARGE INSTRUCTIONS
Return if pain worsens or problems sooner.  Please review the medications you are supposed to be taking according to prior physician recommendations. I have not changed your home medications during this visit. If your discharge instructions indicate that I have changed your home medications, this is not the case, and you should disregard. If you have any questions about the medication you should be taking at home, please call your physician.   Information regarding Risks and Benefits When using Opioids and Other Controlled Substances to include Storage and Disposal of Medications    When considering the use of opioids and other controlled substances for the control of pain, anxiety, or for other medical purposes, you need to know of not only the benefits of these drugs but also of potential risks in using these drugs. These drugs, as well as more drugs, have beneficial uses; which is why their use is being considered in your   care, but they have risks involved in their use, too.    Opioids:  Opioids such as hydrocodone, oxycodone, hydromorphone, and codeine are pain relieving drugs, some more potent than others. They are most useful for moderate to more severe painful conditions. Risks include sedation, loss of coordination, decreased concentration, and decreased breathing with possibility of loss of consciousness or even death, especially if used in doses higher than prescribed. Improper usage can lead to addiction, tolerance, or overdose. In addition, many of these drugs are combined with acetaminophen (Tylenol) which can damage or destroy our liver when used excessively.  Alternatives to opioids are useful for mild to moderate pain and include ibuprofen (Motrin), naproxen (Aleve), aspirin, and acetaminophen (Tylenol). As with other drugs, these medications should be used according to directions on the label or from your doctor, as overuse can cause harm.    Benzodiazepines:  This group of drugs include:  alprazolam (Xanax), diazepam (Valium), lorazepam (Ativan), and clonazepam (Klonopin). These drugs are used to control anxiety symptoms including anxiety and panic attacks. Risks using these drugs include: sedation, loss of coordination, decreased ability to concentrate, effects on memory, and decreased breathing with possibility of loss of consciousness or even death. Improper and prolonged usage can lead to addiction. An alternative without addiction potential is hydroxyzine (Vistrail).    Other Controlled Substance:  This group includes Soma, Tramadol, stimulant drugs such as Ritalin, and others. Stimulant drugs are not medications that are prescribed by ER doctors. Soma and Tramadol have sedative and addictive affects similar to opioids with the same dangers mentioned with them.    Overdose:  If you or someone else are concerned that overdose has occurred, call 911 for transportation to the nearest hospital.    Storage and Disposal:  All medications need to be kept out of the reach of children or adults who cannot manage their own medicines. In addition, controlled substances can be targeted by criminals so extra precautions need to be taken to keep them in a safe, secure place. Any unused medications should be disposed of by flushing them down the toilet in the home setting or contact your local pharmacy.

## 2017-07-20 NOTE — ED PROVIDER NOTES
Subjective   HPI Comments: 32-year-old white female with known history of kidney stones complaining of right flank pain and blood in urine this morning.  She states she is also had dysuria and some extent urgency as well.  She denies any fever, diarrhea, or abdominal pain otherwise.  She states she is nauseated.    Patient is a 32 y.o. female presenting with flank pain.   History provided by:  Patient  Flank Pain   Pain location:  R flank  Pain quality: dull    Pain radiates to:  Does not radiate  Pain severity:  Moderate  Onset quality:  Sudden  Duration:  1 day  Timing:  Constant  Progression:  Worsening  Chronicity:  New  Relieved by:  Nothing  Worsened by:  Nothing  Ineffective treatments:  None tried  Associated symptoms: dysuria, nausea and vomiting    Associated symptoms: no anorexia, no belching, no chills, no constipation, no diarrhea and no fever        Review of Systems   Constitutional: Negative for chills and fever.   Gastrointestinal: Positive for nausea and vomiting. Negative for anorexia, constipation and diarrhea.   Genitourinary: Positive for dysuria and flank pain.   All other systems reviewed and are negative.      Past Medical History:   Diagnosis Date   • Depression    • Diabetes mellitus    • DVT (deep venous thrombosis)    • GERD (gastroesophageal reflux disease)    • Gout    • Hyperlipidemia    • Hypertension    • Hypothyroid    • Kidney stone    • Migraine    • Neuropathy    • PE (pulmonary embolism)        Allergies   Allergen Reactions   • Amoxicillin Hives   • Penicillins Hives   • Toradol [Ketorolac Tromethamine] Hives       Past Surgical History:   Procedure Laterality Date   • CARDIAC SURGERY      Heart Cath done in    •  SECTION     • CHOLECYSTECTOMY     • COLONOSCOPY         Family History   Problem Relation Age of Onset   • No Known Problems Mother    • No Known Problems Father    • No Known Problems Sister    • No Known Problems Brother    • No Known Problems Son    •  No Known Problems Daughter    • No Known Problems Maternal Grandmother    • No Known Problems Maternal Grandfather    • No Known Problems Paternal Grandmother    • No Known Problems Paternal Grandfather    • No Known Problems Cousin    • Rheum arthritis Neg Hx    • Osteoarthritis Neg Hx    • Asthma Neg Hx    • Diabetes Neg Hx    • Heart failure Neg Hx    • Hyperlipidemia Neg Hx    • Hypertension Neg Hx    • Migraines Neg Hx    • Rashes / Skin problems Neg Hx    • Seizures Neg Hx    • Stroke Neg Hx    • Thyroid disease Neg Hx        Social History     Social History   • Marital status:      Spouse name: N/A   • Number of children: N/A   • Years of education: N/A     Social History Main Topics   • Smoking status: Never Smoker   • Smokeless tobacco: None   • Alcohol use No   • Drug use: No   • Sexual activity: Defer     Other Topics Concern   • None     Social History Narrative           Objective   Physical Exam   Constitutional: She appears well-developed and well-nourished.   HENT:   Head: Normocephalic and atraumatic.   Eyes: Conjunctivae are normal.   Neck: Normal range of motion.   Cardiovascular: Normal rate, regular rhythm and normal heart sounds.    No murmur heard.  Pulmonary/Chest: Effort normal and breath sounds normal. No respiratory distress.   Abdominal: Soft. Bowel sounds are normal. There is no tenderness. There is no rebound and no guarding.   Musculoskeletal: Normal range of motion. She exhibits no edema.   Mild right CVA tenderness   Neurological: She is alert.   Skin: Skin is warm and dry. No erythema.   Psychiatric: She has a normal mood and affect. Her behavior is normal. Judgment and thought content normal.   Nursing note and vitals reviewed.      Procedures         ED Course  ED Course   Comment By Time   Patient with no other complaints or concerns. PRICE Feliciano 07/20 1651   PRANAY query unsuccessful secondary to prolonged retrieval time/inoperable PRANAY system. PRICE Feliciano  07/20 1652          Recent Results (from the past 24 hour(s))   Urinalysis With / Culture If Indicated    Collection Time: 07/20/17 10:40 AM   Result Value Ref Range    Color, UA Red (A) Yellow, Straw    Appearance, UA Cloudy (A) Clear    pH, UA 7.0 5.0 - 8.0    Specific Gravity, UA 1.023 1.001 - 1.030    Glucose, UA Negative Negative    Ketones, UA Trace (A) Negative    Bilirubin, UA Negative Negative    Blood, UA Large (3+) (A) Negative    Protein, UA 30 mg/dL (1+) (A) Negative    Leuk Esterase, UA Small (1+) (A) Negative    Nitrite, UA Negative Negative    Urobilinogen, UA 1.0 E.U./dL 0.2 - 1.0 E.U./dL   Pregnancy, Urine    Collection Time: 07/20/17 10:40 AM   Result Value Ref Range    HCG, Urine QL Negative Negative   Urinalysis, Microscopic Only    Collection Time: 07/20/17 10:40 AM   Result Value Ref Range    RBC, UA Too Numerous to Count (A) None Seen, 0-2 /HPF    WBC, UA 3-5 (A) None Seen /HPF    Bacteria, UA None Seen None Seen, Trace /HPF    Squamous Epithelial Cells, UA 3-6 (A) None Seen, 0-2 /HPF    Hyaline Casts, UA 0-6 0 - 6 /LPF    Methodology Automated Microscopy    POCT pregnancy, urine    Collection Time: 07/20/17 10:42 AM   Result Value Ref Range    HCG, Urine, QL Negative Negative    Lot Number [id number]     Internal Positive Control Positive     Internal Negative Control Negative    Comprehensive Metabolic Panel    Collection Time: 07/20/17 10:48 AM   Result Value Ref Range    Glucose 157 (H) 70 - 100 mg/dL    BUN 7 (L) 9 - 23 mg/dL    Creatinine 0.60 0.60 - 1.30 mg/dL    Sodium 137 132 - 146 mmol/L    Potassium 4.0 3.5 - 5.5 mmol/L    Chloride 102 99 - 109 mmol/L    CO2 25.0 20.0 - 31.0 mmol/L    Calcium 9.8 8.7 - 10.4 mg/dL    Total Protein 7.6 5.7 - 8.2 g/dL    Albumin 4.40 3.20 - 4.80 g/dL    ALT (SGPT) 44 (H) 7 - 40 U/L    AST (SGOT) 53 (H) 0 - 33 U/L    Alkaline Phosphatase 76 25 - 100 U/L    Total Bilirubin 0.3 0.3 - 1.2 mg/dL    eGFR Non African Amer 117 >60 mL/min/1.73    Globulin 3.2  gm/dL    A/G Ratio 1.4 (L) 1.5 - 2.5 g/dL    BUN/Creatinine Ratio 11.7 7.0 - 25.0    Anion Gap 10.0 3.0 - 11.0 mmol/L   Lipase    Collection Time: 07/20/17 10:48 AM   Result Value Ref Range    Lipase 40 6 - 51 U/L   Light Blue Top    Collection Time: 07/20/17 10:48 AM   Result Value Ref Range    Extra Tube hold for add-on    Green Top (Gel)    Collection Time: 07/20/17 10:48 AM   Result Value Ref Range    Extra Tube Hold for add-ons.    Lavender Top    Collection Time: 07/20/17 10:48 AM   Result Value Ref Range    Extra Tube hold for add-on    Gold Top - SST    Collection Time: 07/20/17 10:48 AM   Result Value Ref Range    Extra Tube Hold for add-ons.    CBC Auto Differential    Collection Time: 07/20/17 10:48 AM   Result Value Ref Range    WBC 5.37 3.50 - 10.80 10*3/mm3    RBC 4.00 3.89 - 5.14 10*6/mm3    Hemoglobin 10.6 (L) 11.5 - 15.5 g/dL    Hematocrit 33.8 (L) 34.5 - 44.0 %    MCV 84.5 80.0 - 99.0 fL    MCH 26.5 (L) 27.0 - 31.0 pg    MCHC 31.4 (L) 32.0 - 36.0 g/dL    RDW 16.1 (H) 11.3 - 14.5 %    RDW-SD 49.4 37.0 - 54.0 fl    MPV 9.2 6.0 - 12.0 fL    Platelets 232 150 - 450 10*3/mm3    Neutrophil % 51.0 41.0 - 71.0 %    Lymphocyte % 37.4 24.0 - 44.0 %    Monocyte % 8.4 0.0 - 12.0 %    Eosinophil % 2.4 0.0 - 3.0 %    Basophil % 0.6 0.0 - 1.0 %    Immature Grans % 0.2 0.0 - 0.6 %    Neutrophils, Absolute 2.74 1.50 - 8.30 10*3/mm3    Lymphocytes, Absolute 2.01 0.60 - 4.80 10*3/mm3    Monocytes, Absolute 0.45 0.00 - 1.00 10*3/mm3    Eosinophils, Absolute 0.13 0.00 - 0.30 10*3/mm3    Basophils, Absolute 0.03 0.00 - 0.20 10*3/mm3    Immature Grans, Absolute 0.01 0.00 - 0.03 10*3/mm3   Protime-INR    Collection Time: 07/20/17 10:48 AM   Result Value Ref Range    Protime 31.7 (H) 9.6 - 11.5 Seconds    INR 2.82      Note: In addition to lab results from this visit, the labs listed above may include labs taken at another facility or during a different encounter within the last 24 hours. Please correlate lab times with ED  admission and discharge times for further clarification of the services performed during this visit.    US Non-ob Transvaginal   Final Result   Single bilateral ovarian cyst. On the right the cyst   measures approximately 1.6 x 1.9 cm and on the left there is a cyst   measuring the same size.       D:  07/20/2017   E:  07/20/2017                This report was finalized on 7/20/2017 4:25 PM by Dr. John Paul Garcia MD.          CT Abdomen Pelvis Without Contrast   Final Result   There are no acute findings. There are small bilateral renal   calcifications, but there is no renal mass, ureteral stone or   obstructive uropathy. The right ovary is enlarged measuring 4.1 x 4.7   cm. The right ovary has been prominent on numerous previous examinations   but is slightly larger than on the last examination of 08/27/2016.       D:  07/20/2017   E:  07/20/2017       This report was finalized on 7/20/2017 3:13 PM by Dr. John Paul Garcia MD.            Vitals:    07/20/17 1308 07/20/17 1414 07/20/17 1551 07/20/17 1647   BP: 103/68 151/82  111/78   BP Location: Left arm Left arm  Left arm   Patient Position: Lying Lying  Lying   Pulse: 63 69 64 67   Resp:    18   Temp:    98.2 °F (36.8 °C)   TempSrc:    Oral   SpO2: 97% 95% 98% 95%   Weight:       Height:         Medications   sodium chloride 0.9 % flush 10 mL (not administered)   sodium chloride 0.9 % bolus 500 mL (0 mL Intravenous Stopped 7/20/17 1215)   HYDROmorphone (DILAUDID) injection 0.5 mg (0.5 mg Intravenous Given 7/20/17 1110)   ondansetron (ZOFRAN) injection 4 mg (4 mg Intravenous Given 7/20/17 1109)   HYDROmorphone (DILAUDID) injection 0.5 mg (0.5 mg Intravenous Given 7/20/17 1215)   HYDROmorphone (DILAUDID) injection 0.5 mg (0.5 mg Intravenous Given 7/20/17 1314)   HYDROmorphone (DILAUDID) injection 0.5 mg (0.5 mg Intravenous Given 7/20/17 1450)   HYDROmorphone (DILAUDID) injection 0.5 mg (0.5 mg Intravenous Given 7/20/17 8344)   ondansetron (ZOFRAN) injection 4 mg (4 mg  Intravenous Given 7/20/17 9883)     ECG/EMG Results (last 24 hours)     ** No results found for the last 24 hours. **              [facility]    Final diagnoses:   Lower abdominal pain   Cyst of right ovary            PRICE Feliciano  07/20/17 1280

## 2017-07-22 LAB — BACTERIA SPEC AEROBE CULT: NORMAL

## 2017-07-24 ENCOUNTER — HOSPITAL ENCOUNTER (EMERGENCY)
Facility: HOSPITAL | Age: 32
Discharge: HOME | End: 2017-07-24
Attending: EMERGENCY MEDICINE | Admitting: EMERGENCY MEDICINE

## 2017-07-24 ENCOUNTER — APPOINTMENT (OUTPATIENT)
Dept: CT IMAGING | Facility: HOSPITAL | Age: 32
End: 2017-07-24

## 2017-07-24 VITALS
WEIGHT: 293 LBS | SYSTOLIC BLOOD PRESSURE: 150 MMHG | BODY MASS INDEX: 50.02 KG/M2 | OXYGEN SATURATION: 98 % | TEMPERATURE: 98.2 F | HEIGHT: 64 IN | DIASTOLIC BLOOD PRESSURE: 78 MMHG | RESPIRATION RATE: 18 BRPM | HEART RATE: 87 BPM

## 2017-07-24 DIAGNOSIS — K76.0 HEPATIC STEATOSIS: ICD-10-CM

## 2017-07-24 DIAGNOSIS — N83.201 RIGHT OVARIAN CYST: ICD-10-CM

## 2017-07-24 DIAGNOSIS — R10.31 RIGHT LOWER QUADRANT ABDOMINAL PAIN: Primary | ICD-10-CM

## 2017-07-24 DIAGNOSIS — N20.0 NEPHROLITHIASIS: ICD-10-CM

## 2017-07-24 LAB
ALBUMIN SERPL-MCNC: 4.6 G/DL (ref 3.5–5)
ALBUMIN/GLOB SERPL: 1.3 G/DL (ref 1.5–2.5)
ALP SERPL-CCNC: 80 U/L (ref 35–104)
ALT SERPL W P-5'-P-CCNC: 35 U/L (ref 10–36)
ANION GAP SERPL CALCULATED.3IONS-SCNC: 11.6 MMOL/L (ref 3.6–11.2)
AST SERPL-CCNC: 46 U/L (ref 10–30)
B-HCG UR QL: NEGATIVE
BACTERIA UR QL AUTO: ABNORMAL /HPF
BASOPHILS # BLD AUTO: 0.02 10*3/MM3 (ref 0–0.3)
BASOPHILS NFR BLD AUTO: 0.4 % (ref 0–2)
BILIRUB SERPL-MCNC: 0.3 MG/DL (ref 0.2–1.8)
BILIRUB UR QL STRIP: NEGATIVE
BUN BLD-MCNC: 10 MG/DL (ref 7–21)
BUN/CREAT SERPL: 16.9 (ref 7–25)
CALCIUM SPEC-SCNC: 10.6 MG/DL (ref 7.7–10)
CHLORIDE SERPL-SCNC: 105 MMOL/L (ref 99–112)
CLARITY UR: ABNORMAL
CO2 SERPL-SCNC: 23.4 MMOL/L (ref 24.3–31.9)
COLOR UR: ABNORMAL
CREAT BLD-MCNC: 0.59 MG/DL (ref 0.43–1.29)
DEPRECATED RDW RBC AUTO: 46.6 FL (ref 37–54)
EOSINOPHIL # BLD AUTO: 0.11 10*3/MM3 (ref 0–0.7)
EOSINOPHIL NFR BLD AUTO: 1.9 % (ref 0–5)
ERYTHROCYTE [DISTWIDTH] IN BLOOD BY AUTOMATED COUNT: 16 % (ref 11.5–14.5)
GFR SERPL CREATININE-BSD FRML MDRD: 119 ML/MIN/1.73
GLOBULIN UR ELPH-MCNC: 3.6 GM/DL
GLUCOSE BLD-MCNC: 145 MG/DL (ref 70–110)
GLUCOSE UR STRIP-MCNC: NEGATIVE MG/DL
HCT VFR BLD AUTO: 34.9 % (ref 37–47)
HGB BLD-MCNC: 11.2 G/DL (ref 12–16)
HGB UR QL STRIP.AUTO: ABNORMAL
HYALINE CASTS UR QL AUTO: ABNORMAL /LPF
IMM GRANULOCYTES # BLD: 0.01 10*3/MM3 (ref 0–0.03)
IMM GRANULOCYTES NFR BLD: 0.2 % (ref 0–0.5)
INR PPP: 3.68 (ref 0.9–1.1)
KETONES UR QL STRIP: NEGATIVE
LEUKOCYTE ESTERASE UR QL STRIP.AUTO: NEGATIVE
LYMPHOCYTES # BLD AUTO: 1.73 10*3/MM3 (ref 1–3)
LYMPHOCYTES NFR BLD AUTO: 30.3 % (ref 21–51)
MCH RBC QN AUTO: 26.2 PG (ref 27–33)
MCHC RBC AUTO-ENTMCNC: 32.1 G/DL (ref 33–37)
MCV RBC AUTO: 81.7 FL (ref 80–94)
MONOCYTES # BLD AUTO: 0.49 10*3/MM3 (ref 0.1–0.9)
MONOCYTES NFR BLD AUTO: 8.6 % (ref 0–10)
NEUTROPHILS # BLD AUTO: 3.35 10*3/MM3 (ref 1.4–6.5)
NEUTROPHILS NFR BLD AUTO: 58.6 % (ref 30–70)
NITRITE UR QL STRIP: NEGATIVE
OSMOLALITY SERPL CALC.SUM OF ELEC: 281 MOSM/KG (ref 273–305)
PH UR STRIP.AUTO: 7 [PH] (ref 5–8)
PLATELET # BLD AUTO: 259 10*3/MM3 (ref 130–400)
PMV BLD AUTO: 8.9 FL (ref 6–10)
POTASSIUM BLD-SCNC: 3.7 MMOL/L (ref 3.5–5.3)
PROT SERPL-MCNC: 8.2 G/DL (ref 6–8)
PROT UR QL STRIP: NEGATIVE
PROTHROMBIN TIME: 37.1 SECONDS (ref 11–15.4)
RBC # BLD AUTO: 4.27 10*6/MM3 (ref 4.2–5.4)
RBC # UR: ABNORMAL /HPF
REF LAB TEST METHOD: ABNORMAL
SODIUM BLD-SCNC: 140 MMOL/L (ref 135–153)
SP GR UR STRIP: 1.02 (ref 1–1.03)
SQUAMOUS #/AREA URNS HPF: ABNORMAL /HPF
UROBILINOGEN UR QL STRIP: ABNORMAL
WBC NRBC COR # BLD: 5.71 10*3/MM3 (ref 4.5–12.5)
WBC UR QL AUTO: ABNORMAL /HPF

## 2017-07-24 PROCEDURE — 87086 URINE CULTURE/COLONY COUNT: CPT | Performed by: EMERGENCY MEDICINE

## 2017-07-24 PROCEDURE — 99284 EMERGENCY DEPT VISIT MOD MDM: CPT

## 2017-07-24 PROCEDURE — 85025 COMPLETE CBC W/AUTO DIFF WBC: CPT | Performed by: EMERGENCY MEDICINE

## 2017-07-24 PROCEDURE — 74176 CT ABD & PELVIS W/O CONTRAST: CPT | Performed by: RADIOLOGY

## 2017-07-24 PROCEDURE — 81001 URINALYSIS AUTO W/SCOPE: CPT | Performed by: EMERGENCY MEDICINE

## 2017-07-24 PROCEDURE — 81025 URINE PREGNANCY TEST: CPT | Performed by: EMERGENCY MEDICINE

## 2017-07-24 PROCEDURE — 80053 COMPREHEN METABOLIC PANEL: CPT | Performed by: EMERGENCY MEDICINE

## 2017-07-24 PROCEDURE — 74176 CT ABD & PELVIS W/O CONTRAST: CPT

## 2017-07-24 PROCEDURE — 85610 PROTHROMBIN TIME: CPT | Performed by: EMERGENCY MEDICINE

## 2017-07-24 RX ORDER — HYDROCODONE BITARTRATE AND ACETAMINOPHEN 5; 325 MG/1; MG/1
1 TABLET ORAL ONCE
Status: COMPLETED | OUTPATIENT
Start: 2017-07-24 | End: 2017-07-24

## 2017-07-24 RX ORDER — HYDROCODONE BITARTRATE AND ACETAMINOPHEN 5; 325 MG/1; MG/1
TABLET ORAL
Status: COMPLETED
Start: 2017-07-24 | End: 2017-07-24

## 2017-07-24 RX ADMIN — HYDROCODONE BITARTRATE AND ACETAMINOPHEN 1 TABLET: 5; 325 TABLET ORAL at 06:03

## 2017-07-24 NOTE — DISCHARGE INSTRUCTIONS
Hypertension  Hypertension, commonly called high blood pressure, is when the force of blood pumping through your arteries is too strong. Your arteries are the blood vessels that carry blood from your heart throughout your body. A blood pressure reading consists of a higher number over a lower number, such as 110/72. The higher number (systolic) is the pressure inside your arteries when your heart pumps. The lower number (diastolic) is the pressure inside your arteries when your heart relaxes. Ideally you want your blood pressure below 120/80.  Hypertension forces your heart to work harder to pump blood. Your arteries may become narrow or stiff. Having untreated or uncontrolled hypertension can cause heart attack, stroke, kidney disease, and other problems.  RISK FACTORS  Some risk factors for high blood pressure are controllable. Others are not.   Risk factors you cannot control include:   · Race. You may be at higher risk if you are .  · Age. Risk increases with age.  · Gender. Men are at higher risk than women before age 45 years. After age 65, women are at higher risk than men.  Risk factors you can control include:  · Not getting enough exercise or physical activity.  · Being overweight.  · Getting too much fat, sugar, calories, or salt in your diet.  · Drinking too much alcohol.  SIGNS AND SYMPTOMS  Hypertension does not usually cause signs or symptoms. Extremely high blood pressure (hypertensive crisis) may cause headache, anxiety, shortness of breath, and nosebleed.  DIAGNOSIS  To check if you have hypertension, your health care provider will measure your blood pressure while you are seated, with your arm held at the level of your heart. It should be measured at least twice using the same arm. Certain conditions can cause a difference in blood pressure between your right and left arms. A blood pressure reading that is higher than normal on one occasion does not mean that you need  treatment. If it is not clear whether you have high blood pressure, you may be asked to return on a different day to have your blood pressure checked again. Or, you may be asked to monitor your blood pressure at home for 1 or more weeks.  TREATMENT  Treating high blood pressure includes making lifestyle changes and possibly taking medicine. Living a healthy lifestyle can help lower high blood pressure. You may need to change some of your habits.  Lifestyle changes may include:  · Following the DASH diet. This diet is high in fruits, vegetables, and whole grains. It is low in salt, red meat, and added sugars.  · Keep your sodium intake below 2,300 mg per day.  · Getting at least 30-45 minutes of aerobic exercise at least 4 times per week.  · Losing weight if necessary.  · Not smoking.  · Limiting alcoholic beverages.  · Learning ways to reduce stress.  Your health care provider may prescribe medicine if lifestyle changes are not enough to get your blood pressure under control, and if one of the following is true:  · You are 18-59 years of age and your systolic blood pressure is above 140.  · You are 60 years of age or older, and your systolic blood pressure is above 150.  · Your diastolic blood pressure is above 90.  · You have diabetes, and your systolic blood pressure is over 140 or your diastolic blood pressure is over 90.  · You have kidney disease and your blood pressure is above 140/90.  · You have heart disease and your blood pressure is above 140/90.  Your personal target blood pressure may vary depending on your medical conditions, your age, and other factors.  HOME CARE INSTRUCTIONS  · Have your blood pressure rechecked as directed by your health care provider.    · Take medicines only as directed by your health care provider. Follow the directions carefully. Blood pressure medicines must be taken as prescribed. The medicine does not work as well when you skip doses. Skipping doses also puts you at risk for  problems.  · Do not smoke.    · Monitor your blood pressure at home as directed by your health care provider.   SEEK MEDICAL CARE IF:   · You think you are having a reaction to medicines taken.  · You have recurrent headaches or feel dizzy.  · You have swelling in your ankles.  · You have trouble with your vision.  SEEK IMMEDIATE MEDICAL CARE IF:  · You develop a severe headache or confusion.  · You have unusual weakness, numbness, or feel faint.  · You have severe chest or abdominal pain.  · You vomit repeatedly.  · You have trouble breathing.  MAKE SURE YOU:   · Understand these instructions.  · Will watch your condition.  · Will get help right away if you are not doing well or get worse.     This information is not intended to replace advice given to you by your health care provider. Make sure you discuss any questions you have with your health care provider.     Document Released: 12/18/2006 Document Revised: 05/03/2016 Document Reviewed: 10/10/2014  [employer] Interactive Patient Education ©2017 [employer]

## 2017-07-24 NOTE — ED PROVIDER NOTES
Subjective   Patient is a 32 y.o. female presenting with lower extremity pain and abdominal pain.   Lower Extremity Issue   Location:  Hip  Time since incident:  2 days  Injury: no    Hip location:  R hip  Pain details:     Quality:  Burning    Radiates to:  Does not radiate    Severity:  Moderate    Timing:  Constant    Progression:  Unchanged  Chronicity:  New  Dislocation: no    Foreign body present:  No foreign bodies  Prior injury to area:  No  Relieved by:  Nothing  Worsened by:  Nothing  Ineffective treatments:  Acetaminophen  Associated symptoms: no back pain, no fatigue, no fever and no neck pain    Risk factors: obesity    Risk factors comment:  Previous ovarian cyst  Abdominal Pain   Pain location:  R flank and RLQ  Pain quality: aching, cramping and dull    Pain radiates to:  R flank  Pain severity:  Mild  Onset quality:  Gradual  Timing:  Constant  Progression:  Worsening  Chronicity:  New  Context: not alcohol use, not awakening from sleep, not diet changes, not eating, not laxative use, not medication withdrawal, not previous surgeries, not recent illness, not recent sexual activity, not recent travel, not retching, not sick contacts, not suspicious food intake and not trauma    Relieved by:  Nothing  Worsened by:  Nothing  Ineffective treatments:  None tried  Associated symptoms: nausea    Associated symptoms: no chest pain, no chills, no cough, no diarrhea, no dysuria, no fatigue, no fever, no shortness of breath, no sore throat and no vomiting    Nausea:     Severity:  Mild    Onset quality:  Gradual    Timing:  Constant    Progression:  Worsening  Risk factors: obesity        Review of Systems   Constitutional: Negative for activity change, appetite change, chills, diaphoresis, fatigue and fever.   HENT: Negative for congestion, ear pain and sore throat.    Eyes: Negative for redness.   Respiratory: Negative for cough, chest tightness, shortness of breath and wheezing.    Cardiovascular: Negative  for chest pain, palpitations and leg swelling.   Gastrointestinal: Positive for abdominal pain and nausea. Negative for diarrhea and vomiting.   Genitourinary: Negative for dysuria and urgency.   Musculoskeletal: Negative for arthralgias, back pain, myalgias and neck pain.   Skin: Negative for pallor, rash and wound.   Neurological: Negative for dizziness, speech difficulty, weakness and headaches.   Psychiatric/Behavioral: Negative for agitation, behavioral problems, confusion and decreased concentration.       Past Medical History:   Diagnosis Date   • Depression    • Diabetes mellitus    • DVT (deep venous thrombosis)    • GERD (gastroesophageal reflux disease)    • Gout    • Hyperlipidemia    • Hypertension    • Hypothyroid    • Kidney stone    • Migraine    • Neuropathy    • PE (pulmonary embolism)        Allergies   Allergen Reactions   • Amoxicillin Hives   • Penicillins Hives   • Toradol [Ketorolac Tromethamine] Hives       Past Surgical History:   Procedure Laterality Date   • CARDIAC SURGERY      Heart Cath done in    •  SECTION     • CHOLECYSTECTOMY     • COLONOSCOPY         Family History   Problem Relation Age of Onset   • No Known Problems Mother    • No Known Problems Father    • No Known Problems Sister    • No Known Problems Brother    • No Known Problems Son    • No Known Problems Daughter    • No Known Problems Maternal Grandmother    • No Known Problems Maternal Grandfather    • No Known Problems Paternal Grandmother    • No Known Problems Paternal Grandfather    • No Known Problems Cousin    • Rheum arthritis Neg Hx    • Osteoarthritis Neg Hx    • Asthma Neg Hx    • Diabetes Neg Hx    • Heart failure Neg Hx    • Hyperlipidemia Neg Hx    • Hypertension Neg Hx    • Migraines Neg Hx    • Rashes / Skin problems Neg Hx    • Seizures Neg Hx    • Stroke Neg Hx    • Thyroid disease Neg Hx        Social History     Social History   • Marital status:      Spouse name: N/A   • Number of  children: N/A   • Years of education: N/A     Social History Main Topics   • Smoking status: Never Smoker   • Smokeless tobacco: None   • Alcohol use No   • Drug use: No   • Sexual activity: Defer     Other Topics Concern   • None     Social History Narrative         Objective   Physical Exam   Constitutional: She is oriented to person, place, and time. She appears well-developed and well-nourished. No distress.   HENT:   Head: Normocephalic and atraumatic.   Right Ear: External ear normal.   Left Ear: External ear normal.   Nose: Nose normal.   Eyes: Conjunctivae and EOM are normal. Pupils are equal, round, and reactive to light.   Neck: Normal range of motion. Neck supple. No JVD present. No tracheal deviation present.   Cardiovascular: Normal rate, regular rhythm and normal heart sounds.    No murmur heard.  Pulmonary/Chest: Effort normal and breath sounds normal. No stridor. No respiratory distress. She has no wheezes.   Abdominal: Soft. Bowel sounds are normal. There is tenderness. There is CVA tenderness.   Musculoskeletal: Normal range of motion. She exhibits no edema or deformity.   Neurological: She is alert and oriented to person, place, and time. She displays normal reflexes. No cranial nerve deficit. She exhibits normal muscle tone.   Skin: Skin is warm and dry. No rash noted. She is not diaphoretic. No erythema. No pallor.   Psychiatric: She has a normal mood and affect. Her behavior is normal. Thought content normal.   Nursing note and vitals reviewed.      Procedures         LAB RESULTS  Lab Results (last 24 hours)     Procedure Component Value Units Date/Time    CBC & Differential [[id number]] Collected:  07/24/17 0449    Specimen:  Blood Updated:  07/24/17 0458    Narrative:       The following orders were created for panel order CBC & Differential.  Procedure                               Abnormality         Status                     ---------                               -----------          ------                     CBC Auto Differential[[id number]]        Abnormal            Final result                 Please view results for these tests on the individual orders.    Comprehensive Metabolic Panel [[id number]]  (Abnormal) Collected:  07/24/17 0449    Specimen:  Blood from Arm, Left Updated:  07/24/17 0522     Glucose 145 (H) mg/dL      BUN 10 mg/dL      Creatinine 0.59 mg/dL      Sodium 140 mmol/L      Potassium 3.7 mmol/L      Chloride 105 mmol/L      CO2 23.4 (L) mmol/L      Calcium 10.6 (H) mg/dL      Total Protein 8.2 (H) g/dL      Albumin 4.60 g/dL      ALT (SGPT) 35 U/L      AST (SGOT) 46 (H) U/L      Alkaline Phosphatase 80 U/L       Note New Reference Ranges        Total Bilirubin 0.3 mg/dL      eGFR Non African Amer 119 mL/min/1.73      Globulin 3.6 gm/dL      A/G Ratio 1.3 (L) g/dL      BUN/Creatinine Ratio 16.9     Anion Gap 11.6 (H) mmol/L     Protime-INR [[id number]]  (Abnormal) Collected:  07/24/17 0449    Specimen:  Blood from Arm, Left Updated:  07/24/17 0511     Protime 37.1 (H) Seconds       Note new Reference Range        INR 3.68 (H)    Narrative:       Suggested INR therapeutic range for stable oral anticoagulant therapy:    Low Intensity therapy:   1.5-2.0  Moderate Intensity therapy:   2.0-3.0  High Intensity therapy:   2.5-4.0    CBC Auto Differential [[id number]]  (Abnormal) Collected:  07/24/17 0449    Specimen:  Blood from Arm, Left Updated:  07/24/17 0458     WBC 5.71 10*3/mm3      RBC 4.27 10*6/mm3      Hemoglobin 11.2 (L) g/dL      Hematocrit 34.9 (L) %      MCV 81.7 fL      MCH 26.2 (L) pg      MCHC 32.1 (L) g/dL      RDW 16.0 (H) %      RDW-SD 46.6 fl      MPV 8.9 fL      Platelets 259 10*3/mm3      Neutrophil % 58.6 %      Lymphocyte % 30.3 %      Monocyte % 8.6 %      Eosinophil % 1.9 %      Basophil % 0.4 %      Immature Grans % 0.2 %      Neutrophils, Absolute 3.35 10*3/mm3      Lymphocytes, Absolute 1.73 10*3/mm3      Monocytes, Absolute 0.49 10*3/mm3      Eosinophils,  Absolute 0.11 10*3/mm3      Basophils, Absolute 0.02 10*3/mm3      Immature Grans, Absolute 0.01 10*3/mm3     Urinalysis With / Culture If Indicated [[id number]]  (Abnormal) Collected:  07/24/17 0514    Specimen:  Urine from Urine, Clean Catch Updated:  07/24/17 0531     Color, UA Dark Yellow (A)     Appearance, UA Turbid (A)     pH, UA 7.0     Specific Gravity, UA 1.020     Glucose, UA Negative     Ketones, UA Negative     Bilirubin, UA Negative     Blood, UA Large (3+) (A)     Protein, UA Negative     Leuk Esterase, UA Negative     Nitrite, UA Negative     Urobilinogen, UA 1.0 E.U./dL    Pregnancy, Urine [[id number]]  (Normal) Collected:  07/24/17 0514    Specimen:  Urine from Urine, Clean Catch Updated:  07/24/17 0520     HCG, Urine QL Negative    Narrative:       Diluted specimens may cause false negative results.    Urinalysis, Microscopic Only [[id number]]  (Abnormal) Collected:  07/24/17 0514    Specimen:  Urine from Urine, Clean Catch Updated:  07/24/17 0531     RBC, UA Too Numerous to Count (A) /HPF      WBC, UA 0-2 (A) /HPF      Bacteria, UA 1+ (A) /HPF      Squamous Epithelial Cells, UA 0-2 /HPF      Hyaline Casts, UA None Seen /LPF      Methodology Manual Light Microscopy    Urine Culture [[id number]] Collected:  07/24/17 0514    Specimen:  Urine from Urine, Clean Catch Updated:  07/24/17 0531          I ordered the above labs and reviewed the results    RADIOLOGY  CT Abdomen Pelvis Without Contrast    (Results Pending)        I ordered the above noted radiological studies. Interpreted by radiologist. Reviewed by me in PACS.     ED Course  ED Course                     MDM  Number of Diagnoses or Management Options  Hepatic steatosis: established and worsening  Nephrolithiasis: established and worsening  Right lower quadrant abdominal pain: established and worsening  Right ovarian cyst: established and worsening     Amount and/or Complexity of Data Reviewed  Clinical lab tests: ordered and reviewed  Tests in  the radiology section of CPT®: ordered and reviewed  Tests in the medicine section of CPT®: ordered and reviewed  Decide to obtain previous medical records or to obtain history from someone other than the patient: yes  Independent visualization of images, tracings, or specimens: yes    Risk of Complications, Morbidity, and/or Mortality  Presenting problems: moderate  Diagnostic procedures: moderate  Management options: moderate    Patient Progress  Patient progress: stable      Final diagnoses:   Right lower quadrant abdominal pain   Nephrolithiasis   Hepatic steatosis   Right ovarian cyst       Documentation assistance provided by alexandra Cruz.  Information recorded by the scribfawad was done at my direction and has been verified and validated by me.     Roly Cruz  07/24/17 0419       Martir Razo MD  07/24/17 0656

## 2017-07-25 ENCOUNTER — HOSPITAL ENCOUNTER (EMERGENCY)
Facility: HOSPITAL | Age: 32
Discharge: HOME | End: 2017-07-25
Attending: EMERGENCY MEDICINE | Admitting: EMERGENCY MEDICINE

## 2017-07-25 VITALS
RESPIRATION RATE: 17 BRPM | WEIGHT: 293 LBS | SYSTOLIC BLOOD PRESSURE: 157 MMHG | HEIGHT: 64 IN | DIASTOLIC BLOOD PRESSURE: 72 MMHG | TEMPERATURE: 97.9 F | BODY MASS INDEX: 50.02 KG/M2 | OXYGEN SATURATION: 98 % | HEART RATE: 82 BPM

## 2017-07-25 DIAGNOSIS — R31.9 URINARY TRACT INFECTION WITH HEMATURIA, SITE UNSPECIFIED: ICD-10-CM

## 2017-07-25 DIAGNOSIS — N83.201 OVARIAN CYST, RIGHT: Primary | ICD-10-CM

## 2017-07-25 DIAGNOSIS — N39.0 URINARY TRACT INFECTION WITH HEMATURIA, SITE UNSPECIFIED: ICD-10-CM

## 2017-07-25 LAB
6-ACETYL MORPHINE: NEGATIVE
ALBUMIN SERPL-MCNC: 4.7 G/DL (ref 3.5–5)
ALBUMIN/GLOB SERPL: 1.3 G/DL (ref 1.5–2.5)
ALP SERPL-CCNC: 72 U/L (ref 35–104)
ALT SERPL W P-5'-P-CCNC: 32 U/L (ref 10–36)
AMPHET+METHAMPHET UR QL: NEGATIVE
ANION GAP SERPL CALCULATED.3IONS-SCNC: 10.5 MMOL/L (ref 3.6–11.2)
AST SERPL-CCNC: 44 U/L (ref 10–30)
BACTERIA UR QL AUTO: ABNORMAL /HPF
BARBITURATES UR QL SCN: NEGATIVE
BASOPHILS # BLD AUTO: 0.03 10*3/MM3 (ref 0–0.3)
BASOPHILS NFR BLD AUTO: 0.5 % (ref 0–2)
BENZODIAZ UR QL SCN: NEGATIVE
BILIRUB SERPL-MCNC: 0.4 MG/DL (ref 0.2–1.8)
BILIRUB UR QL STRIP: ABNORMAL
BUN BLD-MCNC: 17 MG/DL (ref 7–21)
BUN/CREAT SERPL: 27 (ref 7–25)
BUPRENORPHINE SERPL-MCNC: NEGATIVE NG/ML
CALCIUM SPEC-SCNC: 9.6 MG/DL (ref 7.7–10)
CANNABINOIDS SERPL QL: NEGATIVE
CHLORIDE SERPL-SCNC: 107 MMOL/L (ref 99–112)
CLARITY UR: ABNORMAL
CO2 SERPL-SCNC: 22.5 MMOL/L (ref 24.3–31.9)
COCAINE UR QL: NEGATIVE
COLOR UR: ABNORMAL
CREAT BLD-MCNC: 0.63 MG/DL (ref 0.43–1.29)
DEPRECATED RDW RBC AUTO: 47.3 FL (ref 37–54)
EOSINOPHIL # BLD AUTO: 0.09 10*3/MM3 (ref 0–0.7)
EOSINOPHIL NFR BLD AUTO: 1.5 % (ref 0–5)
ERYTHROCYTE [DISTWIDTH] IN BLOOD BY AUTOMATED COUNT: 16.2 % (ref 11.5–14.5)
GFR SERPL CREATININE-BSD FRML MDRD: 110 ML/MIN/1.73
GLOBULIN UR ELPH-MCNC: 3.5 GM/DL
GLUCOSE BLD-MCNC: 146 MG/DL (ref 70–110)
GLUCOSE UR STRIP-MCNC: NEGATIVE MG/DL
HCT VFR BLD AUTO: 35.9 % (ref 37–47)
HGB BLD-MCNC: 11.5 G/DL (ref 12–16)
HGB UR QL STRIP.AUTO: ABNORMAL
HYALINE CASTS UR QL AUTO: ABNORMAL /LPF
IMM GRANULOCYTES # BLD: 0.02 10*3/MM3 (ref 0–0.03)
IMM GRANULOCYTES NFR BLD: 0.3 % (ref 0–0.5)
INR PPP: 3.46 (ref 0.9–1.1)
KETONES UR QL STRIP: NEGATIVE
LEUKOCYTE ESTERASE UR QL STRIP.AUTO: ABNORMAL
LYMPHOCYTES # BLD AUTO: 2.47 10*3/MM3 (ref 1–3)
LYMPHOCYTES NFR BLD AUTO: 42.2 % (ref 21–51)
MCH RBC QN AUTO: 26.3 PG (ref 27–33)
MCHC RBC AUTO-ENTMCNC: 32 G/DL (ref 33–37)
MCV RBC AUTO: 82 FL (ref 80–94)
METHADONE UR QL SCN: NEGATIVE
MONOCYTES # BLD AUTO: 0.57 10*3/MM3 (ref 0.1–0.9)
MONOCYTES NFR BLD AUTO: 9.7 % (ref 0–10)
NEUTROPHILS # BLD AUTO: 2.68 10*3/MM3 (ref 1.4–6.5)
NEUTROPHILS NFR BLD AUTO: 45.8 % (ref 30–70)
NITRITE UR QL STRIP: POSITIVE
OPIATES UR QL: NEGATIVE
OSMOLALITY SERPL CALC.SUM OF ELEC: 283.6 MOSM/KG (ref 273–305)
OXYCODONE UR QL SCN: NEGATIVE
PCP UR QL SCN: NEGATIVE
PH UR STRIP.AUTO: <=5 [PH] (ref 5–8)
PLATELET # BLD AUTO: 250 10*3/MM3 (ref 130–400)
PMV BLD AUTO: 8.7 FL (ref 6–10)
POTASSIUM BLD-SCNC: 3.7 MMOL/L (ref 3.5–5.3)
PROT SERPL-MCNC: 8.2 G/DL (ref 6–8)
PROT UR QL STRIP: ABNORMAL
PROTHROMBIN TIME: 35.4 SECONDS (ref 11–15.4)
RBC # BLD AUTO: 4.38 10*6/MM3 (ref 4.2–5.4)
RBC # UR: ABNORMAL /HPF
REF LAB TEST METHOD: ABNORMAL
SODIUM BLD-SCNC: 140 MMOL/L (ref 135–153)
SP GR UR STRIP: >1.03 (ref 1–1.03)
SQUAMOUS #/AREA URNS HPF: ABNORMAL /HPF
UROBILINOGEN UR QL STRIP: ABNORMAL
WBC NRBC COR # BLD: 5.86 10*3/MM3 (ref 4.5–12.5)
WBC UR QL AUTO: ABNORMAL /HPF

## 2017-07-25 PROCEDURE — 36415 COLL VENOUS BLD VENIPUNCTURE: CPT

## 2017-07-25 PROCEDURE — 81003 URINALYSIS AUTO W/O SCOPE: CPT | Performed by: PHYSICIAN ASSISTANT

## 2017-07-25 PROCEDURE — 80307 DRUG TEST PRSMV CHEM ANLYZR: CPT | Performed by: PHYSICIAN ASSISTANT

## 2017-07-25 PROCEDURE — 87086 URINE CULTURE/COLONY COUNT: CPT | Performed by: PHYSICIAN ASSISTANT

## 2017-07-25 PROCEDURE — 80053 COMPREHEN METABOLIC PANEL: CPT | Performed by: PHYSICIAN ASSISTANT

## 2017-07-25 PROCEDURE — 99284 EMERGENCY DEPT VISIT MOD MDM: CPT

## 2017-07-25 PROCEDURE — 85025 COMPLETE CBC W/AUTO DIFF WBC: CPT | Performed by: PHYSICIAN ASSISTANT

## 2017-07-25 PROCEDURE — 25010000002 ONDANSETRON PER 1 MG: Performed by: FAMILY MEDICINE

## 2017-07-25 PROCEDURE — 25010000002 MORPHINE PER 10 MG: Performed by: EMERGENCY MEDICINE

## 2017-07-25 PROCEDURE — 81001 URINALYSIS AUTO W/SCOPE: CPT | Performed by: PHYSICIAN ASSISTANT

## 2017-07-25 PROCEDURE — 85610 PROTHROMBIN TIME: CPT | Performed by: PHYSICIAN ASSISTANT

## 2017-07-25 PROCEDURE — 96372 THER/PROPH/DIAG INJ SC/IM: CPT

## 2017-07-25 RX ORDER — ASPIRIN 81 MG/1
81 TABLET ORAL DAILY
COMMUNITY
End: 2019-04-25

## 2017-07-25 RX ORDER — ONDANSETRON 2 MG/ML
4 INJECTION INTRAMUSCULAR; INTRAVENOUS ONCE
Status: COMPLETED | OUTPATIENT
Start: 2017-07-25 | End: 2017-07-25

## 2017-07-25 RX ORDER — NITROFURANTOIN 25; 75 MG/1; MG/1
100 CAPSULE ORAL 2 TIMES DAILY
Qty: 14 CAPSULE | Refills: 0 | Status: SHIPPED | OUTPATIENT
Start: 2017-07-25 | End: 2017-08-01

## 2017-07-25 RX ADMIN — MORPHINE SULFATE 4 MG: 4 INJECTION, SOLUTION INTRAMUSCULAR; INTRAVENOUS at 13:51

## 2017-07-25 RX ADMIN — ONDANSETRON 4 MG: 2 INJECTION, SOLUTION INTRAMUSCULAR; INTRAVENOUS at 13:47

## 2017-07-25 NOTE — ED PROVIDER NOTES
Subjective   Patient is a 32 y.o. female presenting with abdominal pain.   History provided by:  Patient   used: No    Abdominal Pain   Pain location:  RLQ  Pain quality: aching and cramping    Pain radiates to:  Does not radiate  Pain severity:  Moderate  Onset quality:  Sudden  Duration:  1 day  Timing:  Constant  Progression:  Worsening  Chronicity:  New  Context: not alcohol use, not diet changes, not medication withdrawal, not previous surgeries, not recent illness, not retching and not sick contacts    Relieved by:  Nothing  Worsened by:  Nothing  Ineffective treatments:  None tried  Associated symptoms: nausea    Associated symptoms: no anorexia, no belching, no chest pain, no cough, no fever, no flatus and no vomiting    Risk factors: no alcohol abuse, no aspirin use, has not had multiple surgeries and no NSAID use        Review of Systems   Constitutional: Negative for fever.   Respiratory: Negative for cough.    Cardiovascular: Negative for chest pain.   Gastrointestinal: Positive for abdominal pain and nausea. Negative for anorexia, flatus and vomiting.   Musculoskeletal: Negative for joint swelling and myalgias.   All other systems reviewed and are negative.      Past Medical History:   Diagnosis Date   • Depression    • Diabetes mellitus    • DVT (deep venous thrombosis)    • GERD (gastroesophageal reflux disease)    • Gout    • Hyperlipidemia    • Hypertension    • Hypothyroid    • Kidney stone    • Migraine    • Neuropathy    • PE (pulmonary embolism)        Allergies   Allergen Reactions   • Amoxicillin Hives   • Penicillins Hives   • Toradol [Ketorolac Tromethamine] Hives       Past Surgical History:   Procedure Laterality Date   • CARDIAC SURGERY      Heart Cath done in    •  SECTION     • CHOLECYSTECTOMY     • COLONOSCOPY         Family History   Problem Relation Age of Onset   • No Known Problems Mother    • No Known Problems Father    • No Known Problems Sister     • No Known Problems Brother    • No Known Problems Son    • No Known Problems Daughter    • No Known Problems Maternal Grandmother    • No Known Problems Maternal Grandfather    • No Known Problems Paternal Grandmother    • No Known Problems Paternal Grandfather    • No Known Problems Cousin    • Rheum arthritis Neg Hx    • Osteoarthritis Neg Hx    • Asthma Neg Hx    • Diabetes Neg Hx    • Heart failure Neg Hx    • Hyperlipidemia Neg Hx    • Hypertension Neg Hx    • Migraines Neg Hx    • Rashes / Skin problems Neg Hx    • Seizures Neg Hx    • Stroke Neg Hx    • Thyroid disease Neg Hx        Social History     Social History   • Marital status:      Spouse name: N/A   • Number of children: N/A   • Years of education: N/A     Social History Main Topics   • Smoking status: Never Smoker   • Smokeless tobacco: None   • Alcohol use No   • Drug use: No   • Sexual activity: Defer     Other Topics Concern   • None     Social History Narrative           Objective   Physical Exam   Constitutional: She is oriented to person, place, and time. She appears well-developed and well-nourished.   HENT:   Head: Normocephalic.   Right Ear: External ear normal.   Left Ear: External ear normal.   Nose: Nose normal.   Mouth/Throat: Oropharynx is clear and moist.   Eyes: Conjunctivae and EOM are normal. Pupils are equal, round, and reactive to light.   Neck: Normal range of motion. Neck supple. No tracheal deviation present. No thyromegaly present.   Cardiovascular: Normal rate, regular rhythm, normal heart sounds and intact distal pulses.    Pulmonary/Chest: Effort normal and breath sounds normal.   Abdominal: Soft. Bowel sounds are normal.   Musculoskeletal: She exhibits tenderness.        Right hip: She exhibits tenderness.   Neurological: She is alert and oriented to person, place, and time. She has normal reflexes.   Skin: Skin is warm and dry.   Psychiatric: She has a normal mood and affect. Her behavior is normal. Judgment  and thought content normal.   Nursing note and vitals reviewed.      Procedures         ED Course  ED Course   Comment By Time   32-year-old female comes in complaining of right hip pain, hematuria, nausea past 2 days.  Patient denies any fever, chills, and vomiting, diarrhea, constipation. Dayron Loco PA-C 07/25 1246                  MDM  Number of Diagnoses or Management Options  Ovarian cyst, right: new and requires workup  Urinary tract infection with hematuria, site unspecified: new and requires workup     Amount and/or Complexity of Data Reviewed  Clinical lab tests: reviewed and ordered  Tests in the radiology section of CPT®: ordered and reviewed    Risk of Complications, Morbidity, and/or Mortality  Presenting problems: moderate  Diagnostic procedures: moderate  Management options: moderate    Patient Progress  Patient progress: stable      Final diagnoses:   Ovarian cyst, right   Urinary tract infection with hematuria, site unspecified            Dayron Loco PA-C  07/25/17 1538

## 2017-07-26 LAB
BACTERIA SPEC AEROBE CULT: NO GROWTH
BACTERIA SPEC AEROBE CULT: NORMAL

## 2017-07-27 ENCOUNTER — APPOINTMENT (OUTPATIENT)
Dept: CT IMAGING | Facility: HOSPITAL | Age: 32
End: 2017-07-27

## 2017-07-27 ENCOUNTER — HOSPITAL ENCOUNTER (EMERGENCY)
Facility: HOSPITAL | Age: 32
Discharge: HOME | End: 2017-07-27
Attending: EMERGENCY MEDICINE | Admitting: EMERGENCY MEDICINE

## 2017-07-27 VITALS
RESPIRATION RATE: 17 BRPM | TEMPERATURE: 98.1 F | HEART RATE: 75 BPM | OXYGEN SATURATION: 94 % | WEIGHT: 293 LBS | BODY MASS INDEX: 50.02 KG/M2 | SYSTOLIC BLOOD PRESSURE: 142 MMHG | HEIGHT: 64 IN | DIASTOLIC BLOOD PRESSURE: 83 MMHG

## 2017-07-27 DIAGNOSIS — R31.9 HEMATURIA: ICD-10-CM

## 2017-07-27 DIAGNOSIS — Z91.199 H/O NONCOMPLIANCE WITH MEDICAL TREATMENT, PRESENTING HAZARDS TO HEALTH: ICD-10-CM

## 2017-07-27 DIAGNOSIS — R10.9 RIGHT FLANK PAIN: Primary | ICD-10-CM

## 2017-07-27 LAB
ALBUMIN SERPL-MCNC: 4.8 G/DL (ref 3.2–4.8)
ALBUMIN/GLOB SERPL: 1.3 G/DL (ref 1.5–2.5)
ALP SERPL-CCNC: 81 U/L (ref 25–100)
ALT SERPL W P-5'-P-CCNC: 38 U/L (ref 7–40)
ANION GAP SERPL CALCULATED.3IONS-SCNC: 11 MMOL/L (ref 3–11)
AST SERPL-CCNC: 42 U/L (ref 0–33)
B-HCG UR QL: NEGATIVE
BACTERIA UR QL AUTO: ABNORMAL /HPF
BASOPHILS # BLD AUTO: 0.02 10*3/MM3 (ref 0–0.2)
BASOPHILS NFR BLD AUTO: 0.3 % (ref 0–1)
BILIRUB SERPL-MCNC: 0.4 MG/DL (ref 0.3–1.2)
BILIRUB UR QL STRIP: ABNORMAL
BUN BLD-MCNC: 11 MG/DL (ref 9–23)
BUN/CREAT SERPL: 18.3 (ref 7–25)
CALCIUM SPEC-SCNC: 9.8 MG/DL (ref 8.7–10.4)
CHLORIDE SERPL-SCNC: 101 MMOL/L (ref 99–109)
CLARITY UR: ABNORMAL
CO2 SERPL-SCNC: 24 MMOL/L (ref 20–31)
COLOR UR: ABNORMAL
CREAT BLD-MCNC: 0.6 MG/DL (ref 0.6–1.3)
DEPRECATED RDW RBC AUTO: 48.8 FL (ref 37–54)
EOSINOPHIL # BLD AUTO: 0.11 10*3/MM3 (ref 0–0.3)
EOSINOPHIL NFR BLD AUTO: 1.6 % (ref 0–3)
ERYTHROCYTE [DISTWIDTH] IN BLOOD BY AUTOMATED COUNT: 16 % (ref 11.3–14.5)
GFR SERPL CREATININE-BSD FRML MDRD: 117 ML/MIN/1.73
GLOBULIN UR ELPH-MCNC: 3.6 GM/DL
GLUCOSE BLD-MCNC: 163 MG/DL (ref 70–100)
GLUCOSE UR STRIP-MCNC: NEGATIVE MG/DL
HCT VFR BLD AUTO: 38 % (ref 34.5–44)
HGB BLD-MCNC: 11.8 G/DL (ref 11.5–15.5)
HGB UR QL STRIP.AUTO: ABNORMAL
HOLD SPECIMEN: NORMAL
HOLD SPECIMEN: NORMAL
HYALINE CASTS UR QL AUTO: ABNORMAL /LPF
IMM GRANULOCYTES # BLD: 0.01 10*3/MM3 (ref 0–0.03)
IMM GRANULOCYTES NFR BLD: 0.1 % (ref 0–0.6)
INR PPP: 4.98
INTERNAL NEGATIVE CONTROL: NORMAL
INTERNAL POSITIVE CONTROL: NORMAL
KETONES UR QL STRIP: ABNORMAL
LEUKOCYTE ESTERASE UR QL STRIP.AUTO: ABNORMAL
LIPASE SERPL-CCNC: 38 U/L (ref 6–51)
LYMPHOCYTES # BLD AUTO: 2.66 10*3/MM3 (ref 0.6–4.8)
LYMPHOCYTES NFR BLD AUTO: 39.2 % (ref 24–44)
Lab: NORMAL
MCH RBC QN AUTO: 25.9 PG (ref 27–31)
MCHC RBC AUTO-ENTMCNC: 31.1 G/DL (ref 32–36)
MCV RBC AUTO: 83.5 FL (ref 80–99)
MONOCYTES # BLD AUTO: 0.57 10*3/MM3 (ref 0–1)
MONOCYTES NFR BLD AUTO: 8.4 % (ref 0–12)
NEUTROPHILS # BLD AUTO: 3.42 10*3/MM3 (ref 1.5–8.3)
NEUTROPHILS NFR BLD AUTO: 50.4 % (ref 41–71)
NITRITE UR QL STRIP: POSITIVE
PH UR STRIP.AUTO: <=5 [PH] (ref 5–8)
PLATELET # BLD AUTO: 257 10*3/MM3 (ref 150–450)
PMV BLD AUTO: 8.7 FL (ref 6–12)
POTASSIUM BLD-SCNC: 3.8 MMOL/L (ref 3.5–5.5)
PROT SERPL-MCNC: 8.4 G/DL (ref 5.7–8.2)
PROT UR QL STRIP: ABNORMAL
PROTHROMBIN TIME: 57 SECONDS (ref 9.6–11.5)
RBC # BLD AUTO: 4.55 10*6/MM3 (ref 3.89–5.14)
RBC # UR: ABNORMAL /HPF
REF LAB TEST METHOD: ABNORMAL
SODIUM BLD-SCNC: 136 MMOL/L (ref 132–146)
SP GR UR STRIP: >=1.03 (ref 1–1.03)
SQUAMOUS #/AREA URNS HPF: ABNORMAL /HPF
UROBILINOGEN UR QL STRIP: ABNORMAL
WBC NRBC COR # BLD: 6.79 10*3/MM3 (ref 3.5–10.8)
WBC UR QL AUTO: ABNORMAL /HPF
WHOLE BLOOD HOLD SPECIMEN: NORMAL
WHOLE BLOOD HOLD SPECIMEN: NORMAL

## 2017-07-27 PROCEDURE — 85025 COMPLETE CBC W/AUTO DIFF WBC: CPT

## 2017-07-27 PROCEDURE — 80053 COMPREHEN METABOLIC PANEL: CPT

## 2017-07-27 PROCEDURE — 25010000002 ONDANSETRON PER 1 MG: Performed by: EMERGENCY MEDICINE

## 2017-07-27 PROCEDURE — 96361 HYDRATE IV INFUSION ADD-ON: CPT

## 2017-07-27 PROCEDURE — 87086 URINE CULTURE/COLONY COUNT: CPT

## 2017-07-27 PROCEDURE — 99284 EMERGENCY DEPT VISIT MOD MDM: CPT

## 2017-07-27 PROCEDURE — 74176 CT ABD & PELVIS W/O CONTRAST: CPT

## 2017-07-27 PROCEDURE — 25010000002 HYDROMORPHONE PER 4 MG: Performed by: EMERGENCY MEDICINE

## 2017-07-27 PROCEDURE — 83690 ASSAY OF LIPASE: CPT

## 2017-07-27 PROCEDURE — 96375 TX/PRO/DX INJ NEW DRUG ADDON: CPT

## 2017-07-27 PROCEDURE — 85610 PROTHROMBIN TIME: CPT | Performed by: EMERGENCY MEDICINE

## 2017-07-27 PROCEDURE — 81001 URINALYSIS AUTO W/SCOPE: CPT

## 2017-07-27 PROCEDURE — 96374 THER/PROPH/DIAG INJ IV PUSH: CPT

## 2017-07-27 PROCEDURE — 81003 URINALYSIS AUTO W/O SCOPE: CPT

## 2017-07-27 RX ORDER — SODIUM CHLORIDE 0.9 % (FLUSH) 0.9 %
10 SYRINGE (ML) INJECTION AS NEEDED
Status: DISCONTINUED | OUTPATIENT
Start: 2017-07-27 | End: 2017-07-27 | Stop reason: HOSPADM

## 2017-07-27 RX ORDER — PHENAZOPYRIDINE HYDROCHLORIDE 200 MG/1
200 TABLET, FILM COATED ORAL 3 TIMES DAILY PRN
Qty: 9 TABLET | Refills: 0 | Status: SHIPPED | OUTPATIENT
Start: 2017-07-27 | End: 2017-12-01

## 2017-07-27 RX ORDER — TRAMADOL HYDROCHLORIDE 50 MG/1
50 TABLET ORAL ONCE
Status: COMPLETED | OUTPATIENT
Start: 2017-07-27 | End: 2017-07-27

## 2017-07-27 RX ORDER — ONDANSETRON 2 MG/ML
4 INJECTION INTRAMUSCULAR; INTRAVENOUS ONCE
Status: COMPLETED | OUTPATIENT
Start: 2017-07-27 | End: 2017-07-27

## 2017-07-27 RX ADMIN — TRAMADOL HYDROCHLORIDE 50 MG: 50 TABLET, COATED ORAL at 09:23

## 2017-07-27 RX ADMIN — SODIUM CHLORIDE 1000 ML: 9 INJECTION, SOLUTION INTRAVENOUS at 07:54

## 2017-07-27 RX ADMIN — ONDANSETRON 4 MG: 2 INJECTION INTRAMUSCULAR; INTRAVENOUS at 07:57

## 2017-07-27 RX ADMIN — HYDROMORPHONE HYDROCHLORIDE 1 MG: 1 INJECTION, SOLUTION INTRAMUSCULAR; INTRAVENOUS; SUBCUTANEOUS at 07:59

## 2017-07-27 NOTE — DISCHARGE INSTRUCTIONS
Hold Coumadin and Aspirin dose for 2 days and have INR rechecked Monday. Make follow up appointment with Dr. Beyer with Urology to address blood in urine. Make appt with Dr. Goodman in 4 days (Monday) for INR recheck and Coumadin adjustment.     !!!!!!You have had multiple CT scans over the past week! Radiation is a cumulative effect. Increased radiation exposure can lead to cancer and other complications. Visiting multiple different facilities increases your risks.

## 2017-07-27 NOTE — ED PROVIDER NOTES
Subjective   Patient is a 32 y.o. female presenting with flank pain.   History provided by:  Patient  Flank Pain   Pain location:  R flank  Pain quality: aching and sharp    Pain radiates to:  Suprapubic region  Pain severity:  Moderate  Onset quality:  Sudden  Duration:  4 hours  Timing:  Constant  Progression:  Unchanged  Chronicity:  New  Context: awakening from sleep    Relieved by:  Nothing  Worsened by:  Nothing  Ineffective treatments:  NSAIDs  Associated symptoms: nausea    Associated symptoms: no chills, no diarrhea, no fever and no vomiting        Review of Systems   Constitutional: Negative for chills and fever.   Gastrointestinal: Positive for nausea. Negative for diarrhea and vomiting.   Genitourinary: Positive for flank pain.   All other systems reviewed and are negative.      Past Medical History:   Diagnosis Date   • Depression    • Diabetes mellitus    • DVT (deep venous thrombosis)    • GERD (gastroesophageal reflux disease)    • Gout    • Hyperlipidemia    • Hypertension    • Hypothyroid    • Kidney stone    • Migraine    • Neuropathy    • PE (pulmonary embolism)        Allergies   Allergen Reactions   • Amoxicillin Hives   • Penicillins Hives   • Toradol [Ketorolac Tromethamine] Hives       Past Surgical History:   Procedure Laterality Date   • CARDIAC SURGERY      Heart Cath done in    •  SECTION     • CHOLECYSTECTOMY     • COLONOSCOPY         Family History   Problem Relation Age of Onset   • No Known Problems Mother    • No Known Problems Father    • No Known Problems Sister    • No Known Problems Brother    • No Known Problems Son    • No Known Problems Daughter    • No Known Problems Maternal Grandmother    • No Known Problems Maternal Grandfather    • No Known Problems Paternal Grandmother    • No Known Problems Paternal Grandfather    • No Known Problems Cousin    • Rheum arthritis Neg Hx    • Osteoarthritis Neg Hx    • Asthma Neg Hx    • Diabetes Neg Hx    • Heart failure Neg  Hx    • Hyperlipidemia Neg Hx    • Hypertension Neg Hx    • Migraines Neg Hx    • Rashes / Skin problems Neg Hx    • Seizures Neg Hx    • Stroke Neg Hx    • Thyroid disease Neg Hx        Social History     Social History   • Marital status:      Spouse name: N/A   • Number of children: N/A   • Years of education: N/A     Social History Main Topics   • Smoking status: Never Smoker   • Smokeless tobacco: None   • Alcohol use No   • Drug use: No   • Sexual activity: Defer     Other Topics Concern   • None     Social History Narrative           Objective   Physical Exam   Constitutional: She is oriented to person, place, and time. She appears well-developed and well-nourished.   HENT:   Right Ear: External ear normal.   Left Ear: External ear normal.   Eyes: Conjunctivae are normal.   Cardiovascular: Normal rate, regular rhythm and normal heart sounds.    Pulmonary/Chest: Effort normal and breath sounds normal.   Abdominal: Soft. Bowel sounds are normal. There is tenderness in the right lower quadrant. There is no rebound and no CVA tenderness.   Large Obese abdomen with pannus   Neurological: She is alert and oriented to person, place, and time.   Skin: Skin is warm and dry.   Psychiatric: She has a normal mood and affect. Her behavior is normal.   Vitals reviewed.      Procedures         ED Course  ED Course      Recent Results (from the past 24 hour(s))   Protime-INR    Collection Time: 07/27/17  7:49 AM   Result Value Ref Range    Protime 57.0 (C) 9.6 - 11.5 Seconds    INR 4.98    Comprehensive Metabolic Panel    Collection Time: 07/27/17  7:49 AM   Result Value Ref Range    Glucose 163 (H) 70 - 100 mg/dL    BUN 11 9 - 23 mg/dL    Creatinine 0.60 0.60 - 1.30 mg/dL    Sodium 136 132 - 146 mmol/L    Potassium 3.8 3.5 - 5.5 mmol/L    Chloride 101 99 - 109 mmol/L    CO2 24.0 20.0 - 31.0 mmol/L    Calcium 9.8 8.7 - 10.4 mg/dL    Total Protein 8.4 (H) 5.7 - 8.2 g/dL    Albumin 4.80 3.20 - 4.80 g/dL    ALT (SGPT)  38 7 - 40 U/L    AST (SGOT) 42 (H) 0 - 33 U/L    Alkaline Phosphatase 81 25 - 100 U/L    Total Bilirubin 0.4 0.3 - 1.2 mg/dL    eGFR Non African Amer 117 >60 mL/min/1.73    Globulin 3.6 gm/dL    A/G Ratio 1.3 (L) 1.5 - 2.5 g/dL    BUN/Creatinine Ratio 18.3 7.0 - 25.0    Anion Gap 11.0 3.0 - 11.0 mmol/L   Lipase    Collection Time: 07/27/17  7:49 AM   Result Value Ref Range    Lipase 38 6 - 51 U/L   Light Blue Top    Collection Time: 07/27/17  7:49 AM   Result Value Ref Range    Extra Tube hold for add-on    Green Top (Gel)    Collection Time: 07/27/17  7:49 AM   Result Value Ref Range    Extra Tube Hold for add-ons.    Lavender Top    Collection Time: 07/27/17  7:49 AM   Result Value Ref Range    Extra Tube hold for add-on    Gold Top - SST    Collection Time: 07/27/17  7:49 AM   Result Value Ref Range    Extra Tube Hold for add-ons.    CBC Auto Differential    Collection Time: 07/27/17  7:49 AM   Result Value Ref Range    WBC 6.79 3.50 - 10.80 10*3/mm3    RBC 4.55 3.89 - 5.14 10*6/mm3    Hemoglobin 11.8 11.5 - 15.5 g/dL    Hematocrit 38.0 34.5 - 44.0 %    MCV 83.5 80.0 - 99.0 fL    MCH 25.9 (L) 27.0 - 31.0 pg    MCHC 31.1 (L) 32.0 - 36.0 g/dL    RDW 16.0 (H) 11.3 - 14.5 %    RDW-SD 48.8 37.0 - 54.0 fl    MPV 8.7 6.0 - 12.0 fL    Platelets 257 150 - 450 10*3/mm3    Neutrophil % 50.4 41.0 - 71.0 %    Lymphocyte % 39.2 24.0 - 44.0 %    Monocyte % 8.4 0.0 - 12.0 %    Eosinophil % 1.6 0.0 - 3.0 %    Basophil % 0.3 0.0 - 1.0 %    Immature Grans % 0.1 0.0 - 0.6 %    Neutrophils, Absolute 3.42 1.50 - 8.30 10*3/mm3    Lymphocytes, Absolute 2.66 0.60 - 4.80 10*3/mm3    Monocytes, Absolute 0.57 0.00 - 1.00 10*3/mm3    Eosinophils, Absolute 0.11 0.00 - 0.30 10*3/mm3    Basophils, Absolute 0.02 0.00 - 0.20 10*3/mm3    Immature Grans, Absolute 0.01 0.00 - 0.03 10*3/mm3   POCT pregnancy, urine    Collection Time: 07/27/17  7:53 AM   Result Value Ref Range    HCG, Urine, QL Negative Negative    Lot Number [id number]     Internal  Positive Control Presumptive Positive     Internal Negative Control Presumptive Negative    Urinalysis With / Culture If Indicated    Collection Time: 07/27/17  8:00 AM   Result Value Ref Range    Color, UA Red (A) Yellow, Straw    Appearance, UA Turbid (A) Clear    pH, UA <=5.0 5.0 - 8.0    Specific Gravity, UA >=1.030 1.001 - 1.030    Glucose, UA Negative Negative    Ketones, UA Trace (A) Negative    Bilirubin, UA Moderate (2+) (A) Negative    Blood, UA Large (3+) (A) Negative    Protein, UA 30 mg/dL (1+) (A) Negative    Leuk Esterase, UA Small (1+) (A) Negative    Nitrite, UA Positive (A) Negative    Urobilinogen, UA 1.0 E.U./dL 0.2 - 1.0 E.U./dL   Urinalysis, Microscopic Only    Collection Time: 07/27/17  8:00 AM   Result Value Ref Range    RBC, UA Too Numerous to Count (A) None Seen, 0-2 /HPF    WBC, UA 6-12 (A) None Seen /HPF    Bacteria, UA None Seen None Seen, Trace /HPF    Squamous Epithelial Cells, UA 21-30 (A) None Seen, 0-2 /HPF    Hyaline Casts, UA None Seen 0 - 6 /LPF    Methodology Manual Light Microscopy      Note: In addition to lab results from this visit, the labs listed above may include labs taken at another facility or during a different encounter within the last 24 hours. Please correlate lab times with ED admission and discharge times for further clarification of the services performed during this visit.    CT Abdomen Pelvis Without Contrast   Preliminary Result   1. No obstructive uropathy.   2. Bilateral nonobstructing renal calculi.    3. Redemonstration of 4 cm right ovary now with trace fluid adjacent to   it may represent recent follicular rupture in a premenopausal female.       D:  07/27/2017   E:  07/27/2017            Vitals:    07/27/17 0900 07/27/17 0920 07/27/17 0922 07/27/17 0946   BP: 136/80 142/83  142/83   BP Location:       Patient Position:       Pulse: 76 76 75 75   Resp:    17   Temp:    98.1 °F (36.7 °C)   TempSrc:       SpO2: 94% 93% 94% 94%   Weight:       Height:          Medications   sodium chloride 0.9 % flush 10 mL (not administered)   sodium chloride 0.9 % flush 10 mL (not administered)   HYDROmorphone (DILAUDID) injection 1 mg (1 mg Intravenous Given 7/27/17 0759)   ondansetron (ZOFRAN) injection 4 mg (4 mg Intravenous Given 7/27/17 0757)   sodium chloride 0.9 % bolus 1,000 mL (0 mL Intravenous Stopped 7/27/17 0924)   traMADol (ULTRAM) tablet 50 mg (50 mg Oral Given 7/27/17 0923)     ECG/EMG Results (last 24 hours)     ** No results found for the last 24 hours. **        Pt denies visiting any other facilities recently.  records show that pt visited their ED this AM prior to arriving in our ER. Has had 10 ER visits between  and [facility] this month for similar complaints.     Spoke with Dr. Goodman who is pts PCP and he states that pt is supposed to be visiting her local Coumadin clinic for INR checks. States that her use of multiple EDs in different towns is a known issue.    Had a long discussion with patient and significant other regarding her use of multiple different ERs and her lack of follow-up especially with her INR checks.  Advised patient that she is increasing her healthcare risks by visiting multiple facilities for this same complaint.  Also educated patient on the cumulative effect of repeated radiation exposure, and her increased risk for developing cancer or other complications.  I emphasized the importance of following up with radiology to evaluate her hematuria.  Patient was instructed to hold her Coumadin dose and aspirin ×2 days and have INR rechecked on Monday at her primary care provider  in [place], [place]. Pt and SO verb understanding and were thankful for the care.  Patient left ambulatory from this facility in no distress.          MDM    Final diagnoses:   Right flank pain   Hematuria   H/O noncompliance with medical treatment, presenting hazards to health            Stephanie Gonzalo, APRN  07/27/17 0966

## 2017-07-29 LAB — BACTERIA SPEC AEROBE CULT: NORMAL

## 2017-08-04 ENCOUNTER — HOSPITAL ENCOUNTER (EMERGENCY)
Facility: HOSPITAL | Age: 32
Discharge: LEFT BEFORE DISCHARGE | End: 2017-08-04
Attending: EMERGENCY MEDICINE | Admitting: EMERGENCY MEDICINE

## 2017-08-04 VITALS
SYSTOLIC BLOOD PRESSURE: 155 MMHG | OXYGEN SATURATION: 80 % | HEIGHT: 64 IN | BODY MASS INDEX: 50.02 KG/M2 | HEART RATE: 93 BPM | DIASTOLIC BLOOD PRESSURE: 84 MMHG | RESPIRATION RATE: 18 BRPM | TEMPERATURE: 97.9 F | WEIGHT: 293 LBS

## 2017-08-04 DIAGNOSIS — G89.29 CHRONIC ABDOMINAL PAIN: Primary | ICD-10-CM

## 2017-08-04 DIAGNOSIS — R10.9 CHRONIC ABDOMINAL PAIN: Primary | ICD-10-CM

## 2017-08-04 DIAGNOSIS — Z53.29 REFUSAL OF CARE BY PATIENT: ICD-10-CM

## 2017-08-04 LAB
ALBUMIN SERPL-MCNC: 4.2 G/DL (ref 3.2–4.8)
ALBUMIN/GLOB SERPL: 1.2 G/DL (ref 1.5–2.5)
ALP SERPL-CCNC: 72 U/L (ref 25–100)
ALT SERPL W P-5'-P-CCNC: 29 U/L (ref 7–40)
ANION GAP SERPL CALCULATED.3IONS-SCNC: 9 MMOL/L (ref 3–11)
AST SERPL-CCNC: 52 U/L (ref 0–33)
B-HCG UR QL: NEGATIVE
BACTERIA UR QL AUTO: ABNORMAL /HPF
BASOPHILS # BLD AUTO: 0.02 10*3/MM3 (ref 0–0.2)
BASOPHILS NFR BLD AUTO: 0.4 % (ref 0–1)
BILIRUB SERPL-MCNC: 0.4 MG/DL (ref 0.3–1.2)
BILIRUB UR QL STRIP: ABNORMAL
BUN BLD-MCNC: 12 MG/DL (ref 9–23)
BUN/CREAT SERPL: 17.1 (ref 7–25)
CALCIUM SPEC-SCNC: 9.3 MG/DL (ref 8.7–10.4)
CHLORIDE SERPL-SCNC: 100 MMOL/L (ref 99–109)
CLARITY UR: ABNORMAL
CO2 SERPL-SCNC: 27 MMOL/L (ref 20–31)
COD CRY URNS QL: ABNORMAL /HPF
COLOR UR: ABNORMAL
CREAT BLD-MCNC: 0.7 MG/DL (ref 0.6–1.3)
DEPRECATED RDW RBC AUTO: 49.1 FL (ref 37–54)
EOSINOPHIL # BLD AUTO: 0.12 10*3/MM3 (ref 0–0.3)
EOSINOPHIL NFR BLD AUTO: 2.5 % (ref 0–3)
ERYTHROCYTE [DISTWIDTH] IN BLOOD BY AUTOMATED COUNT: 16.2 % (ref 11.3–14.5)
GFR SERPL CREATININE-BSD FRML MDRD: 98 ML/MIN/1.73
GLOBULIN UR ELPH-MCNC: 3.4 GM/DL
GLUCOSE BLD-MCNC: 257 MG/DL (ref 70–100)
GLUCOSE UR STRIP-MCNC: NEGATIVE MG/DL
HCT VFR BLD AUTO: 33.6 % (ref 34.5–44)
HGB BLD-MCNC: 10.5 G/DL (ref 11.5–15.5)
HGB UR QL STRIP.AUTO: ABNORMAL
HOLD SPECIMEN: NORMAL
HOLD SPECIMEN: NORMAL
HYALINE CASTS UR QL AUTO: ABNORMAL /LPF
IMM GRANULOCYTES # BLD: 0.01 10*3/MM3 (ref 0–0.03)
IMM GRANULOCYTES NFR BLD: 0.2 % (ref 0–0.6)
INR PPP: 1.64
INTERNAL NEGATIVE CONTROL: NEGATIVE
INTERNAL POSITIVE CONTROL: POSITIVE
KETONES UR QL STRIP: NEGATIVE
LEUKOCYTE ESTERASE UR QL STRIP.AUTO: NEGATIVE
LIPASE SERPL-CCNC: 36 U/L (ref 6–51)
LYMPHOCYTES # BLD AUTO: 1.74 10*3/MM3 (ref 0.6–4.8)
LYMPHOCYTES NFR BLD AUTO: 36.3 % (ref 24–44)
Lab: NORMAL
MCH RBC QN AUTO: 26.2 PG (ref 27–31)
MCHC RBC AUTO-ENTMCNC: 31.3 G/DL (ref 32–36)
MCV RBC AUTO: 83.8 FL (ref 80–99)
MONOCYTES # BLD AUTO: 0.36 10*3/MM3 (ref 0–1)
MONOCYTES NFR BLD AUTO: 7.5 % (ref 0–12)
NEUTROPHILS # BLD AUTO: 2.54 10*3/MM3 (ref 1.5–8.3)
NEUTROPHILS NFR BLD AUTO: 53.1 % (ref 41–71)
NITRITE UR QL STRIP: NEGATIVE
PH UR STRIP.AUTO: <=5 [PH] (ref 5–8)
PLATELET # BLD AUTO: 199 10*3/MM3 (ref 150–450)
PMV BLD AUTO: 8.9 FL (ref 6–12)
POTASSIUM BLD-SCNC: 3.8 MMOL/L (ref 3.5–5.5)
PROT SERPL-MCNC: 7.6 G/DL (ref 5.7–8.2)
PROT UR QL STRIP: NEGATIVE
PROTHROMBIN TIME: 18.1 SECONDS (ref 9.6–11.5)
RBC # BLD AUTO: 4.01 10*6/MM3 (ref 3.89–5.14)
RBC # UR: ABNORMAL /HPF
REF LAB TEST METHOD: ABNORMAL
SODIUM BLD-SCNC: 136 MMOL/L (ref 132–146)
SP GR UR STRIP: >=1.03 (ref 1–1.03)
SQUAMOUS #/AREA URNS HPF: ABNORMAL /HPF
UROBILINOGEN UR QL STRIP: ABNORMAL
WBC NRBC COR # BLD: 4.79 10*3/MM3 (ref 3.5–10.8)
WBC UR QL AUTO: ABNORMAL /HPF
WHOLE BLOOD HOLD SPECIMEN: NORMAL
WHOLE BLOOD HOLD SPECIMEN: NORMAL

## 2017-08-04 PROCEDURE — 87086 URINE CULTURE/COLONY COUNT: CPT | Performed by: EMERGENCY MEDICINE

## 2017-08-04 PROCEDURE — 85025 COMPLETE CBC W/AUTO DIFF WBC: CPT

## 2017-08-04 PROCEDURE — 81001 URINALYSIS AUTO W/SCOPE: CPT | Performed by: EMERGENCY MEDICINE

## 2017-08-04 PROCEDURE — 80053 COMPREHEN METABOLIC PANEL: CPT | Performed by: EMERGENCY MEDICINE

## 2017-08-04 PROCEDURE — 99283 EMERGENCY DEPT VISIT LOW MDM: CPT

## 2017-08-04 PROCEDURE — 85610 PROTHROMBIN TIME: CPT | Performed by: EMERGENCY MEDICINE

## 2017-08-04 PROCEDURE — 83690 ASSAY OF LIPASE: CPT | Performed by: EMERGENCY MEDICINE

## 2017-08-04 RX ORDER — SODIUM CHLORIDE 0.9 % (FLUSH) 0.9 %
10 SYRINGE (ML) INJECTION AS NEEDED
Status: DISCONTINUED | OUTPATIENT
Start: 2017-08-04 | End: 2017-08-04 | Stop reason: HOSPADM

## 2017-08-04 RX ORDER — SODIUM CHLORIDE 9 MG/ML
125 INJECTION, SOLUTION INTRAVENOUS CONTINUOUS
Status: DISCONTINUED | OUTPATIENT
Start: 2017-08-04 | End: 2017-08-04 | Stop reason: HOSPADM

## 2017-08-04 NOTE — ED PROVIDER NOTES
Subjective   HPI Comments: Natalia Arzate is a 32 y.o. female who presents to the ED w/ c/o flank pain. She has been seen multiple times at both  and [facility] ED for similar symptoms, but presents to the ED today with worsening right flank pain rated a 9/10. The pain today is worse because it is sharper and worsening with urination. She was seen in the ED on 7/27 for similar symptoms and prescribed Narco, which she has finished, and reports that she followed-up with her PCP yesterday. She was seeing urologist Wlil Snell out of [place], but states she is in the process of transferring to . She has a history of kidney stones and ovarian cysts and is followed by a gynecologist at . She denies any CP,SOA, fever or chills, but does note nausea, urinary frequency, and dysuria. No history of sleep apnea, but does have a history of PE/DVT and is on Coumadin. Her last INR check 3.6 and done yesterday by her PCP.     Patient is a 32 y.o. female presenting with flank pain.   History provided by:  Patient  Flank Pain   Pain location:  R flank  Pain quality: sharp    Pain radiates to:  Does not radiate  Pain severity:  Severe (9/10)  Duration:  1 week  Timing:  Constant  Progression:  Worsening  Relieved by:  Nothing  Ineffective treatments: prescribed medications.  Associated symptoms: dysuria and nausea    Associated symptoms: no chest pain, no chills, no cough, no fever and no shortness of breath        Review of Systems   Constitutional: Negative for chills and fever.   Respiratory: Negative for cough and shortness of breath.    Cardiovascular: Negative for chest pain.   Gastrointestinal: Positive for nausea.   Genitourinary: Positive for dysuria, flank pain and frequency.   All other systems reviewed and are negative.      Past Medical History:   Diagnosis Date   • Depression    • Diabetes mellitus    • DVT (deep venous thrombosis)    • GERD (gastroesophageal reflux disease)    • Gout    • Hyperlipidemia    •  Hypertension    • Hypothyroid    • Kidney stone    • Migraine    • Neuropathy    • PE (pulmonary embolism)        Allergies   Allergen Reactions   • Amoxicillin Hives   • Penicillins Hives   • Toradol [Ketorolac Tromethamine] Hives       Past Surgical History:   Procedure Laterality Date   • CARDIAC SURGERY      Heart Cath done in    •  SECTION     • CHOLECYSTECTOMY     • COLONOSCOPY         Family History   Problem Relation Age of Onset   • No Known Problems Mother    • No Known Problems Father    • No Known Problems Sister    • No Known Problems Brother    • No Known Problems Son    • No Known Problems Daughter    • No Known Problems Maternal Grandmother    • No Known Problems Maternal Grandfather    • No Known Problems Paternal Grandmother    • No Known Problems Paternal Grandfather    • No Known Problems Cousin    • Rheum arthritis Neg Hx    • Osteoarthritis Neg Hx    • Asthma Neg Hx    • Diabetes Neg Hx    • Heart failure Neg Hx    • Hyperlipidemia Neg Hx    • Hypertension Neg Hx    • Migraines Neg Hx    • Rashes / Skin problems Neg Hx    • Seizures Neg Hx    • Stroke Neg Hx    • Thyroid disease Neg Hx        Social History     Social History   • Marital status:      Spouse name: N/A   • Number of children: N/A   • Years of education: N/A     Social History Main Topics   • Smoking status: Never Smoker   • Smokeless tobacco: None   • Alcohol use No   • Drug use: No   • Sexual activity: Defer     Other Topics Concern   • None     Social History Narrative   • None         Objective   Physical Exam   Constitutional: She is oriented to person, place, and time. She appears well-developed and well-nourished. No distress.   Obese but non toxic appearing. Comfortable appearing on exam.   HENT:   Head: Normocephalic and atraumatic.   Nose: Nose normal.   Mouth/Throat: Oropharynx is clear and moist.   Eyes: Conjunctivae are normal. No scleral icterus.   Neck: Normal range of motion. Neck supple.   No  masses.   Cardiovascular: Normal rate, regular rhythm and normal heart sounds.  Exam reveals no gallop and no friction rub.    No murmur heard.  No tachycardia. No heaves.   Pulmonary/Chest: Effort normal and breath sounds normal. No respiratory distress. She has no wheezes. She has no rhonchi. She has no rales.   Abdominal: Soft. Bowel sounds are normal. She exhibits no mass. There is no tenderness. There is no rebound and no guarding.   Morbidly obese.    Musculoskeletal: Normal range of motion. She exhibits tenderness.   Back with mild right paralumbar muscular tautness and tenderness but no tenderness to the T, L, or S spine. No C/C/E of the lower extremities. No stigmata of DVT.   Lymphadenopathy:     She has no cervical adenopathy.   Neurological: She is alert and oriented to person, place, and time.   Skin: Skin is warm and dry. She is not diaphoretic.   Psychiatric: She has a normal mood and affect. Her behavior is normal.   Nursing note and vitals reviewed.      Procedures         ED Course  ED Course   Comment By Time   Discussed findings with patient and discussed that the [place] nonnarcotic etiologies to her pain control.  She is not tachycardic or diaphoretic and seemed quite comfortable on examination.  I discussed continue to evaluation the emergency department.  I discussed risks of frequent CTs of the patient's artery had 8 CTs in review of her imaging from this year and that just by our facility alone.  She lives in [place] and her specialists are in [place] as well.  Her primary care physician is in [place].The patient was pleasant and cooperative and seemed in agreement with the plan of care.  I discussed hydration.  The patient however told the nurse that she was going to leave.  She did not tell me this.  By the time I was done with my substance: Patient and the patient regarding left AGAINST MEDICAL ADVICE.  Evaluation was not completed.  Discharge orders and instructions were not given. Caio GAO  MD Ghazala 08/04 1947                 Recent Results (from the past 24 hour(s))   Comprehensive Metabolic Panel    Collection Time: 08/04/17  4:01 PM   Result Value Ref Range    Glucose 257 (H) 70 - 100 mg/dL    BUN 12 9 - 23 mg/dL    Creatinine 0.70 0.60 - 1.30 mg/dL    Sodium 136 132 - 146 mmol/L    Potassium 3.8 3.5 - 5.5 mmol/L    Chloride 100 99 - 109 mmol/L    CO2 27.0 20.0 - 31.0 mmol/L    Calcium 9.3 8.7 - 10.4 mg/dL    Total Protein 7.6 5.7 - 8.2 g/dL    Albumin 4.20 3.20 - 4.80 g/dL    ALT (SGPT) 29 7 - 40 U/L    AST (SGOT) 52 (H) 0 - 33 U/L    Alkaline Phosphatase 72 25 - 100 U/L    Total Bilirubin 0.4 0.3 - 1.2 mg/dL    eGFR Non African Amer 98 >60 mL/min/1.73    Globulin 3.4 gm/dL    A/G Ratio 1.2 (L) 1.5 - 2.5 g/dL    BUN/Creatinine Ratio 17.1 7.0 - 25.0    Anion Gap 9.0 3.0 - 11.0 mmol/L   Lipase    Collection Time: 08/04/17  4:01 PM   Result Value Ref Range    Lipase 36 6 - 51 U/L   Protime-INR    Collection Time: 08/04/17  4:01 PM   Result Value Ref Range    Protime 18.1 (H) 9.6 - 11.5 Seconds    INR 1.64    Light Blue Top    Collection Time: 08/04/17  4:01 PM   Result Value Ref Range    Extra Tube hold for add-on    Green Top (Gel)    Collection Time: 08/04/17  4:01 PM   Result Value Ref Range    Extra Tube Hold for add-ons.    Lavender Top    Collection Time: 08/04/17  4:01 PM   Result Value Ref Range    Extra Tube hold for add-on    Gold Top - SST    Collection Time: 08/04/17  4:01 PM   Result Value Ref Range    Extra Tube Hold for add-ons.    CBC Auto Differential    Collection Time: 08/04/17  4:01 PM   Result Value Ref Range    WBC 4.79 3.50 - 10.80 10*3/mm3    RBC 4.01 3.89 - 5.14 10*6/mm3    Hemoglobin 10.5 (L) 11.5 - 15.5 g/dL    Hematocrit 33.6 (L) 34.5 - 44.0 %    MCV 83.8 80.0 - 99.0 fL    MCH 26.2 (L) 27.0 - 31.0 pg    MCHC 31.3 (L) 32.0 - 36.0 g/dL    RDW 16.2 (H) 11.3 - 14.5 %    RDW-SD 49.1 37.0 - 54.0 fl    MPV 8.9 6.0 - 12.0 fL    Platelets 199 150 - 450 10*3/mm3    Neutrophil % 53.1  41.0 - 71.0 %    Lymphocyte % 36.3 24.0 - 44.0 %    Monocyte % 7.5 0.0 - 12.0 %    Eosinophil % 2.5 0.0 - 3.0 %    Basophil % 0.4 0.0 - 1.0 %    Immature Grans % 0.2 0.0 - 0.6 %    Neutrophils, Absolute 2.54 1.50 - 8.30 10*3/mm3    Lymphocytes, Absolute 1.74 0.60 - 4.80 10*3/mm3    Monocytes, Absolute 0.36 0.00 - 1.00 10*3/mm3    Eosinophils, Absolute 0.12 0.00 - 0.30 10*3/mm3    Basophils, Absolute 0.02 0.00 - 0.20 10*3/mm3    Immature Grans, Absolute 0.01 0.00 - 0.03 10*3/mm3   Urinalysis With / Culture If Indicated    Collection Time: 08/04/17  4:43 PM   Result Value Ref Range    Color, UA Dark Yellow (A) Yellow, Straw    Appearance, UA Cloudy (A) Clear    pH, UA <=5.0 5.0 - 8.0    Specific Gravity, UA >=1.030 1.001 - 1.030    Glucose, UA Negative Negative    Ketones, UA Negative Negative    Bilirubin, UA Small (1+) (A) Negative    Blood, UA Large (3+) (A) Negative    Protein, UA Negative Negative    Leuk Esterase, UA Negative Negative    Nitrite, UA Negative Negative    Urobilinogen, UA 0.2 E.U./dL 0.2 - 1.0 E.U./dL   Urinalysis, Microscopic Only    Collection Time: 08/04/17  4:43 PM   Result Value Ref Range    RBC, UA Too Numerous to Count (A) None Seen, 0-2 /HPF    WBC, UA 0-2 (A) None Seen /HPF    Bacteria, UA 2+ (A) None Seen, Trace /HPF    Squamous Epithelial Cells, UA 3-6 (A) None Seen, 0-2 /HPF    Hyaline Casts, UA 0-6 0 - 6 /LPF    Calcium Oxalate Crystals, UA Moderate/2+ None Seen /HPF    Methodology Manual Light Microscopy    POCT pregnancy, urine    Collection Time: 08/04/17  4:49 PM   Result Value Ref Range    HCG, Urine, QL Negative Negative    Lot Number [id number]     Internal Positive Control Positive     Internal Negative Control Negative      Note: In addition to lab results from this visit, the labs listed above may include labs taken at another facility or during a different encounter within the last 24 hours. Please correlate lab times with ED admission and discharge times for further  "clarification of the services performed during this visit.    No orders to display     Vitals:    08/04/17 1550 08/04/17 1554 08/04/17 1643 08/04/17 1700   BP: 143/79  152/99 155/84   BP Location: Left arm      Patient Position: Sitting      Pulse: 93      Resp: 18      Temp: 97.9 °F (36.6 °C)      TempSrc: Oral      SpO2: 94%   (!) 80%   Weight: (!) 350 lb (159 kg) (!) 350 lb (159 kg)     Height: 64\" (162.6 cm) 64\" (162.6 cm)       Medications - No data to display  ECG/EMG Results (last 24 hours)     ** No results found for the last 24 hours. **            MDM    Final diagnoses:   Chronic abdominal pain   Refusal of care by patient       Documentation assistance provided by scrkriss Batres.  Information recorded by the scribe was done at my direction and has been verified and validated by me.     Natalia Batres  08/04/17 1717       Caio Vivar MD  08/04/17 1950    "

## 2017-08-06 LAB — BACTERIA SPEC AEROBE CULT: NORMAL

## 2017-08-13 ENCOUNTER — HOSPITAL ENCOUNTER (EMERGENCY)
Facility: HOSPITAL | Age: 32
Discharge: HOME | End: 2017-08-13
Attending: EMERGENCY MEDICINE | Admitting: EMERGENCY MEDICINE

## 2017-08-13 VITALS
HEIGHT: 64 IN | HEART RATE: 80 BPM | TEMPERATURE: 98.6 F | SYSTOLIC BLOOD PRESSURE: 125 MMHG | WEIGHT: 293 LBS | BODY MASS INDEX: 50.02 KG/M2 | OXYGEN SATURATION: 99 % | RESPIRATION RATE: 19 BRPM | DIASTOLIC BLOOD PRESSURE: 59 MMHG

## 2017-08-13 DIAGNOSIS — R31.9 HEMATURIA: Primary | ICD-10-CM

## 2017-08-13 LAB
ALBUMIN SERPL-MCNC: 4.4 G/DL (ref 3.5–5)
ALBUMIN/GLOB SERPL: 1.4 G/DL (ref 1.5–2.5)
ALP SERPL-CCNC: 66 U/L (ref 35–104)
ALT SERPL W P-5'-P-CCNC: 34 U/L (ref 10–36)
AMYLASE SERPL-CCNC: 38 U/L (ref 28–100)
ANION GAP SERPL CALCULATED.3IONS-SCNC: 9.9 MMOL/L (ref 3.6–11.2)
AST SERPL-CCNC: 45 U/L (ref 10–30)
B-HCG UR QL: NEGATIVE
BACTERIA UR QL AUTO: ABNORMAL /HPF
BASOPHILS # BLD AUTO: 0.01 10*3/MM3 (ref 0–0.3)
BASOPHILS NFR BLD AUTO: 0.2 % (ref 0–2)
BILIRUB SERPL-MCNC: 0.4 MG/DL (ref 0.2–1.8)
BILIRUB UR QL STRIP: NEGATIVE
BUN BLD-MCNC: 11 MG/DL (ref 7–21)
BUN/CREAT SERPL: 14.7 (ref 7–25)
CALCIUM SPEC-SCNC: 9.6 MG/DL (ref 7.7–10)
CHLORIDE SERPL-SCNC: 107 MMOL/L (ref 99–112)
CLARITY UR: ABNORMAL
CO2 SERPL-SCNC: 24.1 MMOL/L (ref 24.3–31.9)
COLOR UR: ABNORMAL
CREAT BLD-MCNC: 0.75 MG/DL (ref 0.43–1.29)
DEPRECATED RDW RBC AUTO: 49.6 FL (ref 37–54)
EOSINOPHIL # BLD AUTO: 0.1 10*3/MM3 (ref 0–0.7)
EOSINOPHIL NFR BLD AUTO: 2.2 % (ref 0–5)
ERYTHROCYTE [DISTWIDTH] IN BLOOD BY AUTOMATED COUNT: 16.4 % (ref 11.5–14.5)
GFR SERPL CREATININE-BSD FRML MDRD: 90 ML/MIN/1.73
GLOBULIN UR ELPH-MCNC: 3.2 GM/DL
GLUCOSE BLD-MCNC: 284 MG/DL (ref 70–110)
GLUCOSE UR STRIP-MCNC: ABNORMAL MG/DL
HCT VFR BLD AUTO: 33.3 % (ref 37–47)
HGB BLD-MCNC: 10.5 G/DL (ref 12–16)
HGB UR QL STRIP.AUTO: ABNORMAL
HYALINE CASTS UR QL AUTO: ABNORMAL /LPF
IMM GRANULOCYTES # BLD: 0.01 10*3/MM3 (ref 0–0.03)
IMM GRANULOCYTES NFR BLD: 0.2 % (ref 0–0.5)
INR PPP: 3.21 (ref 0.9–1.1)
KETONES UR QL STRIP: NEGATIVE
LEUKOCYTE ESTERASE UR QL STRIP.AUTO: NEGATIVE
LIPASE SERPL-CCNC: 45 U/L (ref 13–60)
LYMPHOCYTES # BLD AUTO: 1.37 10*3/MM3 (ref 1–3)
LYMPHOCYTES NFR BLD AUTO: 29.6 % (ref 21–51)
MCH RBC QN AUTO: 26.5 PG (ref 27–33)
MCHC RBC AUTO-ENTMCNC: 31.5 G/DL (ref 33–37)
MCV RBC AUTO: 84.1 FL (ref 80–94)
MONOCYTES # BLD AUTO: 0.36 10*3/MM3 (ref 0.1–0.9)
MONOCYTES NFR BLD AUTO: 7.8 % (ref 0–10)
NEUTROPHILS # BLD AUTO: 2.78 10*3/MM3 (ref 1.4–6.5)
NEUTROPHILS NFR BLD AUTO: 60 % (ref 30–70)
NITRITE UR QL STRIP: NEGATIVE
OSMOLALITY SERPL CALC.SUM OF ELEC: 291 MOSM/KG (ref 273–305)
PH UR STRIP.AUTO: 7 [PH] (ref 5–8)
PLATELET # BLD AUTO: 252 10*3/MM3 (ref 130–400)
PMV BLD AUTO: 9.3 FL (ref 6–10)
POTASSIUM BLD-SCNC: 3.9 MMOL/L (ref 3.5–5.3)
PROT SERPL-MCNC: 7.6 G/DL (ref 6–8)
PROT UR QL STRIP: NEGATIVE
PROTHROMBIN TIME: 33.3 SECONDS (ref 11–15.4)
RBC # BLD AUTO: 3.96 10*6/MM3 (ref 4.2–5.4)
RBC # UR: ABNORMAL /HPF
REF LAB TEST METHOD: ABNORMAL
SODIUM BLD-SCNC: 141 MMOL/L (ref 135–153)
SP GR UR STRIP: >1.03 (ref 1–1.03)
SQUAMOUS #/AREA URNS HPF: ABNORMAL /HPF
UROBILINOGEN UR QL STRIP: ABNORMAL
WBC NRBC COR # BLD: 4.63 10*3/MM3 (ref 4.5–12.5)
WBC UR QL AUTO: ABNORMAL /HPF

## 2017-08-13 PROCEDURE — 81001 URINALYSIS AUTO W/SCOPE: CPT | Performed by: EMERGENCY MEDICINE

## 2017-08-13 PROCEDURE — 96372 THER/PROPH/DIAG INJ SC/IM: CPT

## 2017-08-13 PROCEDURE — 25010000002 ORPHENADRINE CITRATE PER 60 MG: Performed by: EMERGENCY MEDICINE

## 2017-08-13 PROCEDURE — 85025 COMPLETE CBC W/AUTO DIFF WBC: CPT | Performed by: EMERGENCY MEDICINE

## 2017-08-13 PROCEDURE — 82150 ASSAY OF AMYLASE: CPT | Performed by: EMERGENCY MEDICINE

## 2017-08-13 PROCEDURE — 99283 EMERGENCY DEPT VISIT LOW MDM: CPT

## 2017-08-13 PROCEDURE — 80053 COMPREHEN METABOLIC PANEL: CPT | Performed by: EMERGENCY MEDICINE

## 2017-08-13 PROCEDURE — 85610 PROTHROMBIN TIME: CPT | Performed by: EMERGENCY MEDICINE

## 2017-08-13 PROCEDURE — 81003 URINALYSIS AUTO W/O SCOPE: CPT | Performed by: EMERGENCY MEDICINE

## 2017-08-13 PROCEDURE — 81025 URINE PREGNANCY TEST: CPT | Performed by: EMERGENCY MEDICINE

## 2017-08-13 PROCEDURE — 83690 ASSAY OF LIPASE: CPT | Performed by: EMERGENCY MEDICINE

## 2017-08-13 RX ORDER — ORPHENADRINE CITRATE 30 MG/ML
60 INJECTION INTRAMUSCULAR; INTRAVENOUS ONCE
Status: COMPLETED | OUTPATIENT
Start: 2017-08-13 | End: 2017-08-13

## 2017-08-13 RX ADMIN — ORPHENADRINE CITRATE 60 MG: 30 INJECTION INTRAMUSCULAR; INTRAVENOUS at 22:35

## 2017-08-14 NOTE — ED PROVIDER NOTES
Subjective   Patient is a 32 y.o. female presenting with abdominal pain.   History provided by:  Patient  Abdominal Pain   Pain location:  Epigastric  Pain radiates to:  Does not radiate  Pain severity:  Moderate  Timing:  Constant  Chronicity:  Chronic  Context: eating    Relieved by:  Nothing  Ineffective treatments:  None tried  Associated symptoms: nausea    Associated symptoms: no chest pain, no dysuria and no fever        Review of Systems   Constitutional: Negative.  Negative for fever.   HENT: Negative.    Respiratory: Negative.    Cardiovascular: Negative.  Negative for chest pain.   Gastrointestinal: Positive for abdominal pain and nausea.   Endocrine: Negative.    Genitourinary: Negative.  Negative for dysuria.   Skin: Negative.    Neurological: Negative.    Psychiatric/Behavioral: Negative.    All other systems reviewed and are negative.      Past Medical History:   Diagnosis Date   • Depression    • Diabetes mellitus    • DVT (deep venous thrombosis)    • GERD (gastroesophageal reflux disease)    • Gout    • Hyperlipidemia    • Hypertension    • Hypothyroid    • Kidney stone    • Migraine    • Neuropathy    • PE (pulmonary embolism)        Allergies   Allergen Reactions   • Amoxicillin Hives   • Penicillins Hives   • Toradol [Ketorolac Tromethamine] Hives       Past Surgical History:   Procedure Laterality Date   • CARDIAC SURGERY      Heart Cath done in    •  SECTION     • CHOLECYSTECTOMY     • COLONOSCOPY         Family History   Problem Relation Age of Onset   • No Known Problems Mother    • No Known Problems Father    • No Known Problems Sister    • No Known Problems Brother    • No Known Problems Son    • No Known Problems Daughter    • No Known Problems Maternal Grandmother    • No Known Problems Maternal Grandfather    • No Known Problems Paternal Grandmother    • No Known Problems Paternal Grandfather    • No Known Problems Cousin    • Rheum arthritis Neg Hx    • Osteoarthritis Neg  Hx    • Asthma Neg Hx    • Diabetes Neg Hx    • Heart failure Neg Hx    • Hyperlipidemia Neg Hx    • Hypertension Neg Hx    • Migraines Neg Hx    • Rashes / Skin problems Neg Hx    • Seizures Neg Hx    • Stroke Neg Hx    • Thyroid disease Neg Hx        Social History     Social History   • Marital status:      Spouse name: N/A   • Number of children: N/A   • Years of education: N/A     Social History Main Topics   • Smoking status: Never Smoker   • Smokeless tobacco: None   • Alcohol use No   • Drug use: No   • Sexual activity: Defer     Other Topics Concern   • None     Social History Narrative           Objective   Physical Exam   Constitutional: She is oriented to person, place, and time. She appears well-developed and well-nourished. No distress.   HENT:   Head: Normocephalic and atraumatic.   Right Ear: External ear normal.   Left Ear: External ear normal.   Nose: Nose normal.   Eyes: Conjunctivae and EOM are normal. Pupils are equal, round, and reactive to light.   Neck: Normal range of motion. Neck supple. No JVD present. No tracheal deviation present.   Cardiovascular: Normal rate, regular rhythm and normal heart sounds.    No murmur heard.  Pulmonary/Chest: Effort normal and breath sounds normal. No respiratory distress. She has no wheezes.   Abdominal: Soft. Bowel sounds are normal. There is no tenderness.   Tender epigastric area, good BS   Musculoskeletal: Normal range of motion. She exhibits no edema or deformity.   Neurological: She is alert and oriented to person, place, and time. No cranial nerve deficit.   Skin: Skin is warm and dry. No rash noted. She is not diaphoretic. No erythema. No pallor.   Psychiatric: She has a normal mood and affect. Her behavior is normal. Thought content normal.   Nursing note and vitals reviewed.      Procedures         ED Course  ED Course                  MDM    Final diagnoses:   Hematuria            Francisco J Kate MD  08/14/17 [id number]

## 2017-08-18 ENCOUNTER — APPOINTMENT (OUTPATIENT)
Dept: CT IMAGING | Facility: HOSPITAL | Age: 32
End: 2017-08-18

## 2017-08-18 ENCOUNTER — HOSPITAL ENCOUNTER (EMERGENCY)
Facility: HOSPITAL | Age: 32
Discharge: HOME | End: 2017-08-18
Attending: EMERGENCY MEDICINE | Admitting: EMERGENCY MEDICINE

## 2017-08-18 VITALS
DIASTOLIC BLOOD PRESSURE: 87 MMHG | HEART RATE: 76 BPM | HEIGHT: 65 IN | RESPIRATION RATE: 18 BRPM | WEIGHT: 293 LBS | BODY MASS INDEX: 48.82 KG/M2 | TEMPERATURE: 98 F | SYSTOLIC BLOOD PRESSURE: 176 MMHG | OXYGEN SATURATION: 99 %

## 2017-08-18 DIAGNOSIS — N93.9 ABNORMAL UTERINE BLEEDING: Primary | ICD-10-CM

## 2017-08-18 DIAGNOSIS — B37.31 VAGINAL CANDIDIASIS: ICD-10-CM

## 2017-08-18 LAB
6-ACETYL MORPHINE: NEGATIVE
ALBUMIN SERPL-MCNC: 4.6 G/DL (ref 3.5–5)
ALBUMIN/GLOB SERPL: 1.3 G/DL (ref 1.5–2.5)
ALP SERPL-CCNC: 67 U/L (ref 35–104)
ALT SERPL W P-5'-P-CCNC: 42 U/L (ref 10–36)
AMPHET+METHAMPHET UR QL: NEGATIVE
ANION GAP SERPL CALCULATED.3IONS-SCNC: 6.1 MMOL/L (ref 3.6–11.2)
AST SERPL-CCNC: 75 U/L (ref 10–30)
B-HCG UR QL: NEGATIVE
BACTERIA UR QL AUTO: ABNORMAL /HPF
BARBITURATES UR QL SCN: NEGATIVE
BASOPHILS # BLD AUTO: 0.02 10*3/MM3 (ref 0–0.3)
BASOPHILS NFR BLD AUTO: 0.4 % (ref 0–2)
BENZODIAZ UR QL SCN: NEGATIVE
BILIRUB SERPL-MCNC: 0.3 MG/DL (ref 0.2–1.8)
BILIRUB UR QL STRIP: NEGATIVE
BILIRUB UR QL STRIP: NEGATIVE
BUN BLD-MCNC: 11 MG/DL (ref 7–21)
BUN/CREAT SERPL: 19.3 (ref 7–25)
BUPRENORPHINE SERPL-MCNC: NEGATIVE NG/ML
CALCIUM SPEC-SCNC: 9.8 MG/DL (ref 7.7–10)
CANNABINOIDS SERPL QL: NEGATIVE
CHLORIDE SERPL-SCNC: 103 MMOL/L (ref 99–112)
CLARITY UR: ABNORMAL
CLARITY UR: CLEAR
CO2 SERPL-SCNC: 25.9 MMOL/L (ref 24.3–31.9)
COCAINE UR QL: NEGATIVE
COLOR UR: ABNORMAL
COLOR UR: YELLOW
CREAT BLD-MCNC: 0.57 MG/DL (ref 0.43–1.29)
DEPRECATED RDW RBC AUTO: 49.4 FL (ref 37–54)
EOSINOPHIL # BLD AUTO: 0.13 10*3/MM3 (ref 0–0.7)
EOSINOPHIL NFR BLD AUTO: 2.3 % (ref 0–5)
ERYTHROCYTE [DISTWIDTH] IN BLOOD BY AUTOMATED COUNT: 16.6 % (ref 11.5–14.5)
GFR SERPL CREATININE-BSD FRML MDRD: 124 ML/MIN/1.73
GLOBULIN UR ELPH-MCNC: 3.6 GM/DL
GLUCOSE BLD-MCNC: 146 MG/DL (ref 70–110)
GLUCOSE UR STRIP-MCNC: ABNORMAL MG/DL
GLUCOSE UR STRIP-MCNC: ABNORMAL MG/DL
HCT VFR BLD AUTO: 33.4 % (ref 37–47)
HGB BLD-MCNC: 10.6 G/DL (ref 12–16)
HGB UR QL STRIP.AUTO: ABNORMAL
HGB UR QL STRIP.AUTO: NEGATIVE
HYALINE CASTS UR QL AUTO: ABNORMAL /LPF
IMM GRANULOCYTES # BLD: 0 10*3/MM3 (ref 0–0.03)
IMM GRANULOCYTES NFR BLD: 0 % (ref 0–0.5)
INR PPP: 3.27 (ref 0.9–1.1)
KETONES UR QL STRIP: NEGATIVE
KETONES UR QL STRIP: NEGATIVE
LEUKOCYTE ESTERASE UR QL STRIP.AUTO: NEGATIVE
LEUKOCYTE ESTERASE UR QL STRIP.AUTO: NEGATIVE
LYMPHOCYTES # BLD AUTO: 1.87 10*3/MM3 (ref 1–3)
LYMPHOCYTES NFR BLD AUTO: 32.9 % (ref 21–51)
MCH RBC QN AUTO: 26.6 PG (ref 27–33)
MCHC RBC AUTO-ENTMCNC: 31.7 G/DL (ref 33–37)
MCV RBC AUTO: 83.9 FL (ref 80–94)
METHADONE UR QL SCN: NEGATIVE
MONOCYTES # BLD AUTO: 0.52 10*3/MM3 (ref 0.1–0.9)
MONOCYTES NFR BLD AUTO: 9.2 % (ref 0–10)
NEUTROPHILS # BLD AUTO: 3.14 10*3/MM3 (ref 1.4–6.5)
NEUTROPHILS NFR BLD AUTO: 55.2 % (ref 30–70)
NITRITE UR QL STRIP: NEGATIVE
NITRITE UR QL STRIP: NEGATIVE
OPIATES UR QL: NEGATIVE
OSMOLALITY SERPL CALC.SUM OF ELEC: 272.1 MOSM/KG (ref 273–305)
OXYCODONE UR QL SCN: NEGATIVE
PCP UR QL SCN: NEGATIVE
PH UR STRIP.AUTO: 5.5 [PH] (ref 5–8)
PH UR STRIP.AUTO: <=5 [PH] (ref 5–8)
PLATELET # BLD AUTO: 263 10*3/MM3 (ref 130–400)
PMV BLD AUTO: 9.3 FL (ref 6–10)
POTASSIUM BLD-SCNC: 4.6 MMOL/L (ref 3.5–5.3)
PROT SERPL-MCNC: 8.2 G/DL (ref 6–8)
PROT UR QL STRIP: ABNORMAL
PROT UR QL STRIP: NEGATIVE
PROTHROMBIN TIME: 33.8 SECONDS (ref 11–15.4)
RBC # BLD AUTO: 3.98 10*6/MM3 (ref 4.2–5.4)
RBC # UR: ABNORMAL /HPF
REF LAB TEST METHOD: ABNORMAL
SODIUM BLD-SCNC: 135 MMOL/L (ref 135–153)
SP GR UR STRIP: >1.03 (ref 1–1.03)
SP GR UR STRIP: >1.03 (ref 1–1.03)
SQUAMOUS #/AREA URNS HPF: ABNORMAL /HPF
UROBILINOGEN UR QL STRIP: ABNORMAL
UROBILINOGEN UR QL STRIP: ABNORMAL
WBC NRBC COR # BLD: 5.68 10*3/MM3 (ref 4.5–12.5)
WBC UR QL AUTO: ABNORMAL /HPF

## 2017-08-18 PROCEDURE — 25010000002 ONDANSETRON PER 1 MG: Performed by: PHYSICIAN ASSISTANT

## 2017-08-18 PROCEDURE — 80307 DRUG TEST PRSMV CHEM ANLYZR: CPT | Performed by: PHYSICIAN ASSISTANT

## 2017-08-18 PROCEDURE — 25010000002 HYDROMORPHONE PER 4 MG

## 2017-08-18 PROCEDURE — 74176 CT ABD & PELVIS W/O CONTRAST: CPT

## 2017-08-18 PROCEDURE — 96374 THER/PROPH/DIAG INJ IV PUSH: CPT

## 2017-08-18 PROCEDURE — 80053 COMPREHEN METABOLIC PANEL: CPT | Performed by: PHYSICIAN ASSISTANT

## 2017-08-18 PROCEDURE — 87086 URINE CULTURE/COLONY COUNT: CPT | Performed by: PHYSICIAN ASSISTANT

## 2017-08-18 PROCEDURE — 85025 COMPLETE CBC W/AUTO DIFF WBC: CPT | Performed by: PHYSICIAN ASSISTANT

## 2017-08-18 PROCEDURE — 74176 CT ABD & PELVIS W/O CONTRAST: CPT | Performed by: RADIOLOGY

## 2017-08-18 PROCEDURE — P9612 CATHETERIZE FOR URINE SPEC: HCPCS

## 2017-08-18 PROCEDURE — 81003 URINALYSIS AUTO W/O SCOPE: CPT | Performed by: PHYSICIAN ASSISTANT

## 2017-08-18 PROCEDURE — 96375 TX/PRO/DX INJ NEW DRUG ADDON: CPT

## 2017-08-18 PROCEDURE — 99284 EMERGENCY DEPT VISIT MOD MDM: CPT

## 2017-08-18 PROCEDURE — 96361 HYDRATE IV INFUSION ADD-ON: CPT

## 2017-08-18 PROCEDURE — 81025 URINE PREGNANCY TEST: CPT | Performed by: PHYSICIAN ASSISTANT

## 2017-08-18 PROCEDURE — 81001 URINALYSIS AUTO W/SCOPE: CPT | Performed by: PHYSICIAN ASSISTANT

## 2017-08-18 PROCEDURE — 85610 PROTHROMBIN TIME: CPT | Performed by: PHYSICIAN ASSISTANT

## 2017-08-18 RX ORDER — ONDANSETRON 2 MG/ML
4 INJECTION INTRAMUSCULAR; INTRAVENOUS ONCE
Status: COMPLETED | OUTPATIENT
Start: 2017-08-18 | End: 2017-08-18

## 2017-08-18 RX ORDER — HYDROCODONE BITARTRATE AND ACETAMINOPHEN 7.5; 325 MG/1; MG/1
1 TABLET ORAL ONCE
Status: COMPLETED | OUTPATIENT
Start: 2017-08-18 | End: 2017-08-18

## 2017-08-18 RX ORDER — FLUCONAZOLE 150 MG/1
150 TABLET ORAL ONCE
Qty: 2 TABLET | Refills: 0 | Status: SHIPPED | OUTPATIENT
Start: 2017-08-18 | End: 2017-08-18

## 2017-08-18 RX ORDER — SODIUM CHLORIDE 0.9 % (FLUSH) 0.9 %
10 SYRINGE (ML) INJECTION AS NEEDED
Status: DISCONTINUED | OUTPATIENT
Start: 2017-08-18 | End: 2017-08-18 | Stop reason: HOSPADM

## 2017-08-18 RX ADMIN — SODIUM CHLORIDE 500 ML: 9 INJECTION, SOLUTION INTRAVENOUS at 02:52

## 2017-08-18 RX ADMIN — ONDANSETRON 4 MG: 2 INJECTION, SOLUTION INTRAMUSCULAR; INTRAVENOUS at 02:52

## 2017-08-18 RX ADMIN — HYDROCODONE BITARTRATE AND ACETAMINOPHEN 1 TABLET: 7.5; 325 TABLET ORAL at 02:52

## 2017-08-18 RX ADMIN — HYDROMORPHONE HYDROCHLORIDE 1 MG: 1 INJECTION, SOLUTION INTRAMUSCULAR; INTRAVENOUS; SUBCUTANEOUS at 04:43

## 2017-08-18 RX ADMIN — Medication 1 MG: at 04:43

## 2017-08-18 NOTE — ED PROVIDER NOTES
"Subjective   HPI Comments: 32 year old female presents to the ED with right lower abdominal pain. She states she's been hurting for a while, however pain has gotten worse over the past 2 days. Pain radiates to right groin. Pain quality is sharp and stabbing. Intensity if 9/10 and states \"it's getting worse\". Associated symptoms include nausea, hematuria, vaginal itching, and vaginal burning. Denies aggravating or alleviating factors. Last known menstrual period was approx 1 year ago. Reports the blood in her urine to be heavier than a normal menstrual cycle. Unsure of why she doesn't have menstrual cycles anymore. Is not up to date on PAP smear's or pelvic exams. Pertinent medical hx includes nephrolithiasis, morbid obesity, PE/DVT and on Coumadin.    Patient is a 32 y.o. female presenting with abdominal pain.   History provided by:  Patient   used: No    Abdominal Pain   Pain location:  R flank  Pain quality: sharp and stabbing    Pain radiates to:  R flank and groin  Pain severity:  Severe  Duration:  2 days  Timing:  Constant  Progression:  Worsening  Chronicity:  Chronic  Context: not alcohol use, not awakening from sleep and not eating    Relieved by:  Nothing  Worsened by:  Movement  Ineffective treatments:  Lying down, movement, OTC medications and position changes  Associated symptoms: hematuria, vaginal bleeding and vaginal discharge    Associated symptoms: no anorexia, no chest pain, no dysuria and no fever    Risk factors: not pregnant        Review of Systems   Constitutional: Negative.  Negative for fever.   HENT: Negative.    Respiratory: Negative.    Cardiovascular: Negative.  Negative for chest pain.   Gastrointestinal: Positive for abdominal pain. Negative for anorexia.   Endocrine: Negative.    Genitourinary: Positive for flank pain, hematuria, vaginal bleeding and vaginal discharge. Negative for dysuria.        (+) vaginal itching, burning   Skin: Negative.    Neurological: " Negative.    Psychiatric/Behavioral: Negative.    All other systems reviewed and are negative.      Past Medical History:   Diagnosis Date   • Depression    • Diabetes mellitus    • DVT (deep venous thrombosis)    • GERD (gastroesophageal reflux disease)    • Gout    • Hyperlipidemia    • Hypertension    • Hypothyroid    • Kidney stone    • Migraine    • Neuropathy    • PE (pulmonary embolism)        Allergies   Allergen Reactions   • Amoxicillin Hives   • Penicillins Hives   • Toradol [Ketorolac Tromethamine] Hives       Past Surgical History:   Procedure Laterality Date   • CARDIAC SURGERY      Heart Cath done in    •  SECTION     • CHOLECYSTECTOMY     • COLONOSCOPY         Family History   Problem Relation Age of Onset   • No Known Problems Mother    • No Known Problems Father    • No Known Problems Sister    • No Known Problems Brother    • No Known Problems Son    • No Known Problems Daughter    • No Known Problems Maternal Grandmother    • No Known Problems Maternal Grandfather    • No Known Problems Paternal Grandmother    • No Known Problems Paternal Grandfather    • No Known Problems Cousin    • Rheum arthritis Neg Hx    • Osteoarthritis Neg Hx    • Asthma Neg Hx    • Diabetes Neg Hx    • Heart failure Neg Hx    • Hyperlipidemia Neg Hx    • Hypertension Neg Hx    • Migraines Neg Hx    • Rashes / Skin problems Neg Hx    • Seizures Neg Hx    • Stroke Neg Hx    • Thyroid disease Neg Hx        Social History     Social History   • Marital status:      Spouse name: N/A   • Number of children: N/A   • Years of education: N/A     Social History Main Topics   • Smoking status: Never Smoker   • Smokeless tobacco: None   • Alcohol use No   • Drug use: No   • Sexual activity: Defer     Other Topics Concern   • None     Social History Narrative   • None           Objective   Physical Exam   Constitutional: She is oriented to person, place, and time. She appears well-developed and well-nourished. No  distress.   HENT:   Head: Normocephalic and atraumatic.   Right Ear: External ear normal.   Left Ear: External ear normal.   Nose: Nose normal.   Eyes: Conjunctivae and EOM are normal. Pupils are equal, round, and reactive to light.   Neck: Normal range of motion. Neck supple. No JVD present. No tracheal deviation present.   Cardiovascular: Normal rate, regular rhythm and normal heart sounds.    No murmur heard.  Pulmonary/Chest: Effort normal and breath sounds normal. No respiratory distress. She has no wheezes.   Abdominal: Soft. Bowel sounds are normal. She exhibits no distension and no ascites. There is tenderness in the right lower quadrant. There is CVA tenderness.   Morbidly obese. Pannus present. Abdominal exam limited secondary to body habitus.   Musculoskeletal: Normal range of motion. She exhibits no edema or deformity.   Neurological: She is alert and oriented to person, place, and time. No cranial nerve deficit.   Skin: Skin is warm and dry. No rash noted. She is not diaphoretic. No erythema. No pallor.   Psychiatric: She has a normal mood and affect. Her behavior is normal. Thought content normal.   Nursing note and vitals reviewed.      Procedures         ED Course  ED Course   Comment By Time   Discussed imaging and lab findings with patient. It was recommended to her that it is imperative that she follow up with GYN, ASAP. States she has appt at [facility] on Aug. 25. Arjun Alvarse PA-C 08/18 2241   Cased discussed with Dr. Razo regarding plan of care. Arjun Alvares PA-C 08/18 0454                  [facility]    Final diagnoses:   Abnormal uterine bleeding   Vaginal candidiasis            Arjun Alvares PA-C  08/18/17 0457

## 2017-08-20 ENCOUNTER — APPOINTMENT (OUTPATIENT)
Dept: ULTRASOUND IMAGING | Facility: HOSPITAL | Age: 32
End: 2017-08-20

## 2017-08-20 ENCOUNTER — HOSPITAL ENCOUNTER (EMERGENCY)
Facility: HOSPITAL | Age: 32
Discharge: HOME | End: 2017-08-20
Attending: EMERGENCY MEDICINE | Admitting: EMERGENCY MEDICINE

## 2017-08-20 VITALS
WEIGHT: 293 LBS | DIASTOLIC BLOOD PRESSURE: 71 MMHG | TEMPERATURE: 98.4 F | HEART RATE: 79 BPM | BODY MASS INDEX: 50.02 KG/M2 | OXYGEN SATURATION: 96 % | RESPIRATION RATE: 18 BRPM | SYSTOLIC BLOOD PRESSURE: 126 MMHG | HEIGHT: 64 IN

## 2017-08-20 DIAGNOSIS — R31.9 HEMATURIA: ICD-10-CM

## 2017-08-20 DIAGNOSIS — N83.209 CYST OF OVARY, UNSPECIFIED LATERALITY: Primary | ICD-10-CM

## 2017-08-20 LAB
ALBUMIN SERPL-MCNC: 4.4 G/DL (ref 3.5–5)
ALBUMIN/GLOB SERPL: 1.3 G/DL (ref 1.5–2.5)
ALP SERPL-CCNC: 69 U/L (ref 35–104)
ALT SERPL W P-5'-P-CCNC: 53 U/L (ref 10–36)
ANION GAP SERPL CALCULATED.3IONS-SCNC: 8.9 MMOL/L (ref 3.6–11.2)
AST SERPL-CCNC: 79 U/L (ref 10–30)
BACTERIA SPEC AEROBE CULT: NORMAL
BACTERIA UR QL AUTO: ABNORMAL /HPF
BASOPHILS # BLD AUTO: 0.01 10*3/MM3 (ref 0–0.3)
BASOPHILS NFR BLD AUTO: 0.2 % (ref 0–2)
BILIRUB SERPL-MCNC: 0.4 MG/DL (ref 0.2–1.8)
BILIRUB UR QL STRIP: ABNORMAL
BUN BLD-MCNC: 12 MG/DL (ref 7–21)
BUN/CREAT SERPL: 22.2 (ref 7–25)
CALCIUM SPEC-SCNC: 9.4 MG/DL (ref 7.7–10)
CHLORIDE SERPL-SCNC: 103 MMOL/L (ref 99–112)
CLARITY UR: ABNORMAL
CO2 SERPL-SCNC: 25.1 MMOL/L (ref 24.3–31.9)
COLOR UR: ABNORMAL
CREAT BLD-MCNC: 0.54 MG/DL (ref 0.43–1.29)
DEPRECATED RDW RBC AUTO: 48.3 FL (ref 37–54)
EOSINOPHIL # BLD AUTO: 0.09 10*3/MM3 (ref 0–0.7)
EOSINOPHIL NFR BLD AUTO: 2 % (ref 0–5)
ERYTHROCYTE [DISTWIDTH] IN BLOOD BY AUTOMATED COUNT: 16.4 % (ref 11.5–14.5)
GFR SERPL CREATININE-BSD FRML MDRD: 132 ML/MIN/1.73
GLOBULIN UR ELPH-MCNC: 3.3 GM/DL
GLUCOSE BLD-MCNC: 120 MG/DL (ref 70–110)
GLUCOSE UR STRIP-MCNC: ABNORMAL MG/DL
HCT VFR BLD AUTO: 32.9 % (ref 37–47)
HGB BLD-MCNC: 10.5 G/DL (ref 12–16)
HGB UR QL STRIP.AUTO: ABNORMAL
HYALINE CASTS UR QL AUTO: ABNORMAL /LPF
IMM GRANULOCYTES # BLD: 0.01 10*3/MM3 (ref 0–0.03)
IMM GRANULOCYTES NFR BLD: 0.2 % (ref 0–0.5)
INR PPP: 4.77 (ref 0.9–1.1)
INR PPP: 4.94 (ref 0.9–1.1)
KETONES UR QL STRIP: NEGATIVE
LEUKOCYTE ESTERASE UR QL STRIP.AUTO: ABNORMAL
LIPASE SERPL-CCNC: 31 U/L (ref 13–60)
LYMPHOCYTES # BLD AUTO: 1.37 10*3/MM3 (ref 1–3)
LYMPHOCYTES NFR BLD AUTO: 29.9 % (ref 21–51)
MCH RBC QN AUTO: 26.5 PG (ref 27–33)
MCHC RBC AUTO-ENTMCNC: 31.9 G/DL (ref 33–37)
MCV RBC AUTO: 83.1 FL (ref 80–94)
MONOCYTES # BLD AUTO: 0.48 10*3/MM3 (ref 0.1–0.9)
MONOCYTES NFR BLD AUTO: 10.5 % (ref 0–10)
NEUTROPHILS # BLD AUTO: 2.62 10*3/MM3 (ref 1.4–6.5)
NEUTROPHILS NFR BLD AUTO: 57.2 % (ref 30–70)
NITRITE UR QL STRIP: POSITIVE
OSMOLALITY SERPL CALC.SUM OF ELEC: 274.8 MOSM/KG (ref 273–305)
PH UR STRIP.AUTO: <=5 [PH] (ref 5–8)
PLATELET # BLD AUTO: 220 10*3/MM3 (ref 130–400)
PMV BLD AUTO: 8.6 FL (ref 6–10)
POTASSIUM BLD-SCNC: 3.1 MMOL/L (ref 3.5–5.3)
PROT SERPL-MCNC: 7.7 G/DL (ref 6–8)
PROT UR QL STRIP: ABNORMAL
PROTHROMBIN TIME: 45.5 SECONDS (ref 11–15.4)
PROTHROMBIN TIME: 46.8 SECONDS (ref 11–15.4)
RBC # BLD AUTO: 3.96 10*6/MM3 (ref 4.2–5.4)
RBC # UR: ABNORMAL /HPF
REF LAB TEST METHOD: ABNORMAL
SODIUM BLD-SCNC: 137 MMOL/L (ref 135–153)
SP GR UR STRIP: >1.03 (ref 1–1.03)
SQUAMOUS #/AREA URNS HPF: ABNORMAL /HPF
UROBILINOGEN UR QL STRIP: ABNORMAL
WBC NRBC COR # BLD: 4.58 10*3/MM3 (ref 4.5–12.5)
WBC UR QL AUTO: ABNORMAL /HPF

## 2017-08-20 PROCEDURE — 83690 ASSAY OF LIPASE: CPT | Performed by: EMERGENCY MEDICINE

## 2017-08-20 PROCEDURE — 87086 URINE CULTURE/COLONY COUNT: CPT | Performed by: EMERGENCY MEDICINE

## 2017-08-20 PROCEDURE — 96361 HYDRATE IV INFUSION ADD-ON: CPT

## 2017-08-20 PROCEDURE — 25010000002 BUTORPHANOL PER 1 MG: Performed by: EMERGENCY MEDICINE

## 2017-08-20 PROCEDURE — 96375 TX/PRO/DX INJ NEW DRUG ADDON: CPT

## 2017-08-20 PROCEDURE — 85025 COMPLETE CBC W/AUTO DIFF WBC: CPT | Performed by: EMERGENCY MEDICINE

## 2017-08-20 PROCEDURE — 76856 US EXAM PELVIC COMPLETE: CPT | Performed by: RADIOLOGY

## 2017-08-20 PROCEDURE — 80053 COMPREHEN METABOLIC PANEL: CPT | Performed by: EMERGENCY MEDICINE

## 2017-08-20 PROCEDURE — 81001 URINALYSIS AUTO W/SCOPE: CPT | Performed by: EMERGENCY MEDICINE

## 2017-08-20 PROCEDURE — 96374 THER/PROPH/DIAG INJ IV PUSH: CPT

## 2017-08-20 PROCEDURE — 85610 PROTHROMBIN TIME: CPT | Performed by: EMERGENCY MEDICINE

## 2017-08-20 PROCEDURE — 99283 EMERGENCY DEPT VISIT LOW MDM: CPT

## 2017-08-20 PROCEDURE — 25010000002 PROMETHAZINE PER 50 MG: Performed by: EMERGENCY MEDICINE

## 2017-08-20 PROCEDURE — 76856 US EXAM PELVIC COMPLETE: CPT

## 2017-08-20 RX ORDER — SODIUM CHLORIDE 0.9 % (FLUSH) 0.9 %
10 SYRINGE (ML) INJECTION AS NEEDED
Status: DISCONTINUED | OUTPATIENT
Start: 2017-08-20 | End: 2017-08-20 | Stop reason: HOSPADM

## 2017-08-20 RX ORDER — DIFLUNISAL 500 MG/1
500 TABLET, FILM COATED ORAL 2 TIMES DAILY
Qty: 20 TABLET | Refills: 0 | Status: SHIPPED | OUTPATIENT
Start: 2017-08-20 | End: 2017-12-01

## 2017-08-20 RX ORDER — ONDANSETRON 4 MG/1
4 TABLET, ORALLY DISINTEGRATING ORAL 4 TIMES DAILY
Qty: 15 TABLET | Refills: 0 | Status: SHIPPED | OUTPATIENT
Start: 2017-08-20 | End: 2017-12-01

## 2017-08-20 RX ORDER — PROMETHAZINE HYDROCHLORIDE 25 MG/1
25 SUPPOSITORY RECTAL EVERY 6 HOURS PRN
Qty: 12 SUPPOSITORY | Refills: 0 | Status: SHIPPED | OUTPATIENT
Start: 2017-08-20 | End: 2017-12-01

## 2017-08-20 RX ORDER — PROMETHAZINE HYDROCHLORIDE 25 MG/ML
12.5 INJECTION, SOLUTION INTRAMUSCULAR; INTRAVENOUS ONCE
Status: COMPLETED | OUTPATIENT
Start: 2017-08-20 | End: 2017-08-20

## 2017-08-20 RX ORDER — SODIUM CHLORIDE 9 MG/ML
125 INJECTION, SOLUTION INTRAVENOUS CONTINUOUS
Status: DISCONTINUED | OUTPATIENT
Start: 2017-08-20 | End: 2017-08-20 | Stop reason: HOSPADM

## 2017-08-20 RX ADMIN — SODIUM CHLORIDE 1000 ML: 9 INJECTION, SOLUTION INTRAVENOUS at 02:48

## 2017-08-20 RX ADMIN — SODIUM CHLORIDE 125 ML/HR: 9 INJECTION, SOLUTION INTRAVENOUS at 04:07

## 2017-08-20 RX ADMIN — BUTORPHANOL TARTRATE 1 MG: 2 INJECTION, SOLUTION INTRAMUSCULAR; INTRAVENOUS at 03:06

## 2017-08-20 RX ADMIN — PROMETHAZINE HYDROCHLORIDE 12.5 MG: 25 INJECTION INTRAMUSCULAR; INTRAVENOUS at 03:04

## 2017-08-20 NOTE — ED PROVIDER NOTES
Subjective   Patient is a 32 y.o. female presenting with abdominal pain.   History provided by:  Patient and medical records  Abdominal Pain   Pain location:  RLQ  Pain quality: aching and stabbing    Pain radiates to:  Does not radiate  Pain severity:  Severe  Onset quality:  Unable to specify  Duration: This morning.  Timing:  Constant  Progression:  Worsening  Chronicity:  Recurrent  Context: not alcohol use, not awakening from sleep, not diet changes, not eating, not laxative use, not medication withdrawal, not previous surgeries, not recent illness, not recent sexual activity, not recent travel, not retching, not sick contacts, not suspicious food intake and not trauma    Relieved by:  Nothing  Worsened by:  Not moving and palpation  Ineffective treatments:  None tried  Associated symptoms: nausea    Associated symptoms: no anorexia, no belching, no chest pain, no chills, no constipation, no cough, no diarrhea, no dysuria, no fatigue, no fever, no flatus, no hematemesis, no hematochezia, no hematuria, no melena, no shortness of breath, no sore throat, no vaginal bleeding, no vaginal discharge and no vomiting    Risk factors: obesity    Risk factors: no alcohol abuse, no aspirin use, not elderly and not pregnant        Review of Systems   Constitutional: Negative.  Negative for chills, fatigue and fever.   HENT: Negative.  Negative for sore throat.    Eyes: Negative.    Respiratory: Negative.  Negative for cough, chest tightness and shortness of breath.    Cardiovascular: Negative.  Negative for chest pain.   Gastrointestinal: Positive for abdominal pain and nausea. Negative for abdominal distention, anorexia, constipation, diarrhea, flatus, hematemesis, hematochezia, melena and vomiting.   Endocrine: Negative.    Genitourinary: Negative.  Negative for difficulty urinating, dysuria, hematuria, vaginal bleeding and vaginal discharge.   Musculoskeletal: Negative.    Skin: Negative.    Allergic/Immunologic:  Negative.    Neurological: Negative.    Hematological: Negative.    Psychiatric/Behavioral: Negative.    All other systems reviewed and are negative.      Past Medical History:   Diagnosis Date   • Depression    • Diabetes mellitus    • DVT (deep venous thrombosis)    • GERD (gastroesophageal reflux disease)    • Gout    • Hyperlipidemia    • Hypertension    • Hypothyroid    • Kidney stone    • Migraine    • Neuropathy    • PE (pulmonary embolism)        Allergies   Allergen Reactions   • Amoxicillin Hives   • Penicillins Hives   • Toradol [Ketorolac Tromethamine] Hives       Past Surgical History:   Procedure Laterality Date   • CARDIAC SURGERY      Heart Cath done in    •  SECTION     • CHOLECYSTECTOMY     • COLONOSCOPY         Family History   Problem Relation Age of Onset   • No Known Problems Mother    • No Known Problems Father    • No Known Problems Sister    • No Known Problems Brother    • No Known Problems Son    • No Known Problems Daughter    • No Known Problems Maternal Grandmother    • No Known Problems Maternal Grandfather    • No Known Problems Paternal Grandmother    • No Known Problems Paternal Grandfather    • No Known Problems Cousin    • Rheum arthritis Neg Hx    • Osteoarthritis Neg Hx    • Asthma Neg Hx    • Diabetes Neg Hx    • Heart failure Neg Hx    • Hyperlipidemia Neg Hx    • Hypertension Neg Hx    • Migraines Neg Hx    • Rashes / Skin problems Neg Hx    • Seizures Neg Hx    • Stroke Neg Hx    • Thyroid disease Neg Hx        Social History     Social History   • Marital status:      Spouse name: N/A   • Number of children: N/A   • Years of education: N/A     Social History Main Topics   • Smoking status: Never Smoker   • Smokeless tobacco: None   • Alcohol use No   • Drug use: No   • Sexual activity: Defer     Other Topics Concern   • None     Social History Narrative   • None           Objective   Physical Exam   Constitutional: She is oriented to person, place, and  time. She appears well-developed and well-nourished.   HENT:   Head: Normocephalic and atraumatic.   Nose: Nose normal.   Moist mucous membranes   Eyes: Conjunctivae and EOM are normal. Right eye exhibits no discharge. Left eye exhibits no discharge. No scleral icterus.   Neck: Neck supple. No tracheal deviation present.   Cardiovascular: Normal rate, regular rhythm, normal heart sounds and intact distal pulses.  Exam reveals no gallop and no friction rub.    No murmur heard.  Pulmonary/Chest: Effort normal and breath sounds normal. No stridor. No respiratory distress. She has no wheezes. She has no rales. She exhibits no tenderness.   Abdominal: Soft. She exhibits no mass. There is tenderness. There is no guarding.   Diffuse low abdominal tenderness to palpation   Musculoskeletal: Normal range of motion. She exhibits no deformity.   Neurological: She is alert and oriented to person, place, and time. She exhibits normal muscle tone. Coordination normal.   Skin: Skin is warm and dry. No rash noted.   Psychiatric: She has a normal mood and affect. Her behavior is normal. Judgment and thought content normal.   Nursing note and vitals reviewed.      Procedures         ED Course  ED Course      US Pelvis Complete   ED Interpretation   Ultrasound pelvis complete, transabdominal   Faxed report from the rad   Impression: Probable left ovarian cyst.   2.  Nonvisualization of right ovary   Signed Cesario Frausto M.D.      Final Result   1. Nonvisualization of right ovary.   2. Benign-appearing functional cyst left ovary, 2.0 cm.   3. Otherwise unremarkable exam.       This report was finalized on 8/20/2017 8:24 AM by Dr. Spencer Hightower MD.            Labs Reviewed   COMPREHENSIVE METABOLIC PANEL - Abnormal; Notable for the following:        Result Value    Glucose 120 (*)     Potassium 3.1 (*)     ALT (SGPT) 53 (*)     AST (SGOT) 79 (*)     A/G Ratio 1.3 (*)     All other components within normal limits   URINALYSIS W/ CULTURE  IF INDICATED - Abnormal; Notable for the following:     Color, UA Orange (*)     Appearance, UA Cloudy (*)     Specific Gravity, UA >1.030 (*)     Glucose, UA >=1000 mg/dL (3+) (*)     Bilirubin, UA Small (1+) (*)     Blood, UA Large (3+) (*)     Protein, UA Trace (*)     Leuk Esterase, UA Small (1+) (*)     Nitrite, UA Positive (*)     All other components within normal limits   PROTIME-INR - Abnormal; Notable for the following:     Protime 46.8 (*)     INR 4.94 (*)     All other components within normal limits    Narrative:     Suggested INR therapeutic range for stable oral anticoagulant therapy:    Low Intensity therapy:   1.5-2.0  Moderate Intensity therapy:   2.0-3.0  High Intensity therapy:   2.5-4.0   CBC WITH AUTO DIFFERENTIAL - Abnormal; Notable for the following:     RBC 3.96 (*)     Hemoglobin 10.5 (*)     Hematocrit 32.9 (*)     MCH 26.5 (*)     MCHC 31.9 (*)     RDW 16.4 (*)     Monocyte % 10.5 (*)     All other components within normal limits   URINALYSIS, MICROSCOPIC ONLY - Abnormal; Notable for the following:     RBC, UA Too Numerous to Count (*)     WBC, UA 6-12 (*)     Squamous Epithelial Cells, UA 3-6 (*)     All other components within normal limits   PROTIME-INR - Abnormal; Notable for the following:     Protime 45.5 (*)     INR 4.77 (*)     All other components within normal limits    Narrative:     Suggested INR therapeutic range for stable oral anticoagulant therapy:    Low Intensity therapy:   1.5-2.0  Moderate Intensity therapy:   2.0-3.0  High Intensity therapy:   2.5-4.0   LIPASE - Normal   OSMOLALITY, CALCULATED - Normal   URINE CULTURE   CBC AND DIFFERENTIAL    Narrative:     The following orders were created for panel order CBC & Differential.  Procedure                               Abnormality         Status                     ---------                               -----------         ------                     CBC Auto Differential[[id number]]        Abnormal            Final  result                 Please view results for these tests on the individual orders.        Medication List      START taking these medications          diflunisal 500 MG tablet tablet   Commonly known as:  DOLOBID   Take 1 tablet by mouth 2 (Two) Times a Day.         CHANGE how you take these medications          * ondansetron ODT 4 MG disintegrating tablet   Commonly known as:  ZOFRAN-ODT   Take 1 tablet by mouth 4 (Four) Times a Day.   What changed:  Another medication with the same name was added. Make sure   you understand how and when to take each.       * ondansetron ODT 4 MG disintegrating tablet   Commonly known as:  ZOFRAN-ODT   Take 1 tablet by mouth 4 (Four) Times a Day.   What changed:  You were already taking a medication with the same name,   and this prescription was added. Make sure you understand how and when to   take each.       * promethazine 25 MG suppository   Commonly known as:  PHENERGAN   Insert 1 suppository into the rectum Every 6 (Six) Hours As Needed for   nausea or vomiting.   What changed:  Another medication with the same name was added. Make sure   you understand how and when to take each.       * promethazine 25 MG suppository   Commonly known as:  PHENERGAN   Insert 1 suppository into the rectum Every 6 (Six) Hours As Needed for   Nausea or Vomiting.   What changed:  You were already taking a medication with the same name,   and this prescription was added. Make sure you understand how and when to   take each.       * Notice:  This list has 4 medication(s) that are the same as other   medications prescribed for you. Read the directions carefully, and ask   your doctor or other care provider to review them with you.      CONTINUE taking these medications          albuterol 108 (90 Base) MCG/ACT inhaler   Commonly known as:  PROVENTIL HFA;VENTOLIN HFA       allopurinol 300 MG tablet   Commonly known as:  ZYLOPRIM       aspirin 81 MG EC tablet       azelastine 0.1 % nasal spray    Commonly known as:  ASTELIN       azithromycin 250 MG tablet   Commonly known as:  ZITHROMAX Z-FLORIDA   Take 2 tablets the first day, then 1 tablet daily for 4 days.       buPROPion  MG 12 hr tablet   Commonly known as:  WELLBUTRIN SR       cetirizine 10 MG tablet   Commonly known as:  zyrTEC       cholecalciferol 1000 units tablet   Commonly known as:  VITAMIN D3       cyclobenzaprine 10 MG tablet   Commonly known as:  FLEXERIL   Take 1 tablet by mouth 3 (Three) Times a Day As Needed for muscle spasms.       dicyclomine 20 MG tablet   Commonly known as:  BENTYL   Take 1 tablet by mouth Every 6 (Six) Hours As Needed (abdominal cramps).       fish oil 1000 MG capsule capsule       FLUoxetine 20 MG capsule   Commonly known as:  PROzac       fluticasone 50 MCG/ACT nasal spray   Commonly known as:  FLONASE       folic acid 1 MG tablet   Commonly known as:  FOLVITE       gabapentin 800 MG tablet   Commonly known as:  NEURONTIN       glimepiride 2 MG tablet   Commonly known as:  AMARYL       guaifenesin-dextromethorphan  MG tablet sustained-release 12 hour   tablet   Take 2 tablets by mouth Every 12 (Twelve) Hours.       hydrochlorothiazide 25 MG tablet   Commonly known as:  HYDRODIURIL       * HYDROcodone-acetaminophen 5-325 MG per tablet   Commonly known as:  NORCO   Take 1 tablet by mouth 4 (four) times a day as needed for severe pain   (7-10)       * HYDROcodone-acetaminophen 5-325 MG per tablet   Commonly known as:  NORCO   Take 1 tablet by mouth every 6 (six) hours as needed for mild pain (1-3).       * HYDROcodone-acetaminophen 7.5-325 MG per tablet   Commonly known as:  NORCO   Take 1 tablet by mouth Every 6 (Six) Hours As Needed for moderate pain   (4-6).       * HYDROcodone-acetaminophen 5-325 MG per tablet   Commonly known as:  NORCO   Take 1 tablet by mouth Every 6 (Six) Hours As Needed for Moderate Pain .       hydrOXYzine 25 MG tablet   Commonly known as:  ATARAX       levothyroxine 25 MCG tablet    Commonly known as:  SYNTHROID, LEVOTHROID       meclizine 12.5 MG tablet   Commonly known as:  ANTIVERT       meloxicam 15 MG tablet   Commonly known as:  MOBIC   Take 1 tablet by mouth Daily.       methocarbamol 750 MG tablet   Commonly known as:  ROBAXIN       metoprolol succinate XL 25 MG 24 hr tablet   Commonly known as:  TOPROL-XL       mometasone-formoterol 200-5 MCG/ACT inhaler   Commonly known as:  DULERA 200       montelukast 10 MG tablet   Commonly known as:  SINGULAIR       MULTIVITAMIN ADULTS PO       * ondansetron 4 MG tablet   Commonly known as:  ZOFRAN   Take 1 tablet (4 mg total) by mouth every 6 (six) hours       * ondansetron 4 MG tablet   Commonly known as:  ZOFRAN   Take 1 tablet by mouth every 6 (six) hours. PRN Nausea/vomiting       * ondansetron 4 MG tablet   Commonly known as:  ZOFRAN   Take 1 tablet by mouth 4 (Four) Times a Day As Needed for nausea or   vomiting.       * ondansetron 4 MG tablet   Commonly known as:  ZOFRAN   Take 1 tablet by mouth Every 8 (Eight) Hours As Needed for nausea.       oxyCODONE-acetaminophen 5-325 MG per tablet   Commonly known as:  PERCOCET   Take 1 tablet by mouth Every 4 (Four) Hours As Needed for moderate pain   (4-6).       phenazopyridine 200 MG tablet   Commonly known as:  PYRIDIUM   Take 1 tablet by mouth 3 (Three) Times a Day As Needed for bladder spasms.         raNITIdine 150 MG tablet   Commonly known as:  ZANTAC       rivaroxaban 20 MG tablet   Commonly known as:  XARELTO       SUMAtriptan 100 MG tablet   Commonly known as:  IMITREX       tamsulosin 0.4 MG capsule 24 hr capsule   Commonly known as:  FLOMAX   Take 1 capsule by mouth Daily.       topiramate 100 MG tablet   Commonly known as:  TOPAMAX       vitamin B-12 500 MCG tablet   Commonly known as:  CYANOCOBALAMIN       warfarin 10 MG tablet   Commonly known as:  COUMADIN       * Notice:  This list has 8 medication(s) that are the same as other   medications prescribed for you. Read the  directions carefully, and ask   your doctor or other care provider to review them with you.              MDM  Number of Diagnoses or Management Options  Cyst of ovary, unspecified laterality: established and worsening  Hematuria: established and worsening     Amount and/or Complexity of Data Reviewed  Clinical lab tests: ordered and reviewed  Tests in the radiology section of CPT®: ordered and reviewed  Decide to obtain previous medical records or to obtain history from someone other than the patient: yes  Independent visualization of images, tracings, or specimens: yes    Risk of Complications, Morbidity, and/or Mortality  Presenting problems: high  Diagnostic procedures: high  Management options: moderate    Patient Progress  Patient progress: improved      Final diagnoses:   Cyst of ovary, unspecified laterality   Hematuria            Celso Shell MD  08/22/17 0226

## 2017-08-22 LAB — BACTERIA SPEC AEROBE CULT: NORMAL

## 2017-08-27 PROCEDURE — 96372 THER/PROPH/DIAG INJ SC/IM: CPT

## 2017-08-27 PROCEDURE — 99283 EMERGENCY DEPT VISIT LOW MDM: CPT

## 2017-08-28 ENCOUNTER — HOSPITAL ENCOUNTER (EMERGENCY)
Facility: HOSPITAL | Age: 32
Discharge: HOME | End: 2017-08-28
Attending: EMERGENCY MEDICINE | Admitting: EMERGENCY MEDICINE

## 2017-08-28 ENCOUNTER — HOSPITAL ENCOUNTER (EMERGENCY)
Facility: HOSPITAL | Age: 32
Discharge: HOME | End: 2017-08-28
Admitting: NURSE PRACTITIONER

## 2017-08-28 ENCOUNTER — APPOINTMENT (OUTPATIENT)
Dept: CT IMAGING | Facility: HOSPITAL | Age: 32
End: 2017-08-28

## 2017-08-28 VITALS
RESPIRATION RATE: 18 BRPM | DIASTOLIC BLOOD PRESSURE: 85 MMHG | BODY MASS INDEX: 50.02 KG/M2 | TEMPERATURE: 97.8 F | OXYGEN SATURATION: 97 % | SYSTOLIC BLOOD PRESSURE: 137 MMHG | HEIGHT: 64 IN | WEIGHT: 293 LBS | HEART RATE: 74 BPM

## 2017-08-28 VITALS
TEMPERATURE: 98 F | DIASTOLIC BLOOD PRESSURE: 62 MMHG | WEIGHT: 293 LBS | OXYGEN SATURATION: 97 % | BODY MASS INDEX: 50.02 KG/M2 | HEART RATE: 83 BPM | RESPIRATION RATE: 16 BRPM | HEIGHT: 64 IN | SYSTOLIC BLOOD PRESSURE: 139 MMHG

## 2017-08-28 DIAGNOSIS — N83.201 CYST OF RIGHT OVARY: Primary | ICD-10-CM

## 2017-08-28 DIAGNOSIS — R10.31 RIGHT LOWER QUADRANT ABDOMINAL PAIN: Primary | ICD-10-CM

## 2017-08-28 LAB
ALBUMIN SERPL-MCNC: 4.4 G/DL (ref 3.5–5)
ALBUMIN/GLOB SERPL: 1.3 G/DL (ref 1.5–2.5)
ALP SERPL-CCNC: 68 U/L (ref 35–104)
ALT SERPL W P-5'-P-CCNC: 44 U/L (ref 10–36)
ANION GAP SERPL CALCULATED.3IONS-SCNC: 11.6 MMOL/L (ref 3.6–11.2)
AST SERPL-CCNC: 55 U/L (ref 10–30)
B-HCG UR QL: NEGATIVE
BACTERIA UR QL AUTO: ABNORMAL /HPF
BASOPHILS # BLD AUTO: 0.01 10*3/MM3 (ref 0–0.3)
BASOPHILS # BLD AUTO: 0.02 10*3/MM3 (ref 0–0.3)
BASOPHILS NFR BLD AUTO: 0.2 % (ref 0–2)
BASOPHILS NFR BLD AUTO: 0.4 % (ref 0–2)
BILIRUB SERPL-MCNC: 0.5 MG/DL (ref 0.2–1.8)
BILIRUB UR QL STRIP: ABNORMAL
BUN BLD-MCNC: 13 MG/DL (ref 7–21)
BUN/CREAT SERPL: 20.3 (ref 7–25)
CALCIUM SPEC-SCNC: 9.9 MG/DL (ref 7.7–10)
CHLORIDE SERPL-SCNC: 104 MMOL/L (ref 99–112)
CLARITY UR: ABNORMAL
CO2 SERPL-SCNC: 24.4 MMOL/L (ref 24.3–31.9)
COLOR UR: ABNORMAL
CREAT BLD-MCNC: 0.64 MG/DL (ref 0.43–1.29)
DEPRECATED RDW RBC AUTO: 48.1 FL (ref 37–54)
DEPRECATED RDW RBC AUTO: 49.3 FL (ref 37–54)
EOSINOPHIL # BLD AUTO: 0.11 10*3/MM3 (ref 0–0.7)
EOSINOPHIL # BLD AUTO: 0.11 10*3/MM3 (ref 0–0.7)
EOSINOPHIL NFR BLD AUTO: 2 % (ref 0–5)
EOSINOPHIL NFR BLD AUTO: 2 % (ref 0–5)
ERYTHROCYTE [DISTWIDTH] IN BLOOD BY AUTOMATED COUNT: 16.7 % (ref 11.5–14.5)
ERYTHROCYTE [DISTWIDTH] IN BLOOD BY AUTOMATED COUNT: 16.9 % (ref 11.5–14.5)
GFR SERPL CREATININE-BSD FRML MDRD: 108 ML/MIN/1.73
GLOBULIN UR ELPH-MCNC: 3.3 GM/DL
GLUCOSE BLD-MCNC: 117 MG/DL (ref 70–110)
GLUCOSE UR STRIP-MCNC: ABNORMAL MG/DL
HCT VFR BLD AUTO: 30.9 % (ref 37–47)
HCT VFR BLD AUTO: 32.1 % (ref 37–47)
HGB BLD-MCNC: 10.2 G/DL (ref 12–16)
HGB BLD-MCNC: 9.6 G/DL (ref 12–16)
HGB UR QL STRIP.AUTO: ABNORMAL
HYALINE CASTS UR QL AUTO: ABNORMAL /LPF
IMM GRANULOCYTES # BLD: 0.01 10*3/MM3 (ref 0–0.03)
IMM GRANULOCYTES # BLD: 0.02 10*3/MM3 (ref 0–0.03)
IMM GRANULOCYTES NFR BLD: 0.2 % (ref 0–0.5)
IMM GRANULOCYTES NFR BLD: 0.4 % (ref 0–0.5)
KETONES UR QL STRIP: NEGATIVE
LEUKOCYTE ESTERASE UR QL STRIP.AUTO: ABNORMAL
LYMPHOCYTES # BLD AUTO: 1.84 10*3/MM3 (ref 1–3)
LYMPHOCYTES # BLD AUTO: 2.3 10*3/MM3 (ref 1–3)
LYMPHOCYTES NFR BLD AUTO: 34.1 % (ref 21–51)
LYMPHOCYTES NFR BLD AUTO: 41.7 % (ref 21–51)
MCH RBC QN AUTO: 26.4 PG (ref 27–33)
MCH RBC QN AUTO: 26.8 PG (ref 27–33)
MCHC RBC AUTO-ENTMCNC: 31.1 G/DL (ref 33–37)
MCHC RBC AUTO-ENTMCNC: 31.8 G/DL (ref 33–37)
MCV RBC AUTO: 84.5 FL (ref 80–94)
MCV RBC AUTO: 84.9 FL (ref 80–94)
MONOCYTES # BLD AUTO: 0.42 10*3/MM3 (ref 0.1–0.9)
MONOCYTES # BLD AUTO: 0.59 10*3/MM3 (ref 0.1–0.9)
MONOCYTES NFR BLD AUTO: 10.7 % (ref 0–10)
MONOCYTES NFR BLD AUTO: 7.8 % (ref 0–10)
NEUTROPHILS # BLD AUTO: 2.47 10*3/MM3 (ref 1.4–6.5)
NEUTROPHILS # BLD AUTO: 3 10*3/MM3 (ref 1.4–6.5)
NEUTROPHILS NFR BLD AUTO: 44.8 % (ref 30–70)
NEUTROPHILS NFR BLD AUTO: 55.7 % (ref 30–70)
NITRITE UR QL STRIP: POSITIVE
OSMOLALITY SERPL CALC.SUM OF ELEC: 280.5 MOSM/KG (ref 273–305)
PH UR STRIP.AUTO: <=5 [PH] (ref 5–8)
PLATELET # BLD AUTO: 258 10*3/MM3 (ref 130–400)
PLATELET # BLD AUTO: 268 10*3/MM3 (ref 130–400)
PMV BLD AUTO: 9.4 FL (ref 6–10)
PMV BLD AUTO: 9.5 FL (ref 6–10)
POTASSIUM BLD-SCNC: 3.4 MMOL/L (ref 3.5–5.3)
PROT SERPL-MCNC: 7.7 G/DL (ref 6–8)
PROT UR QL STRIP: ABNORMAL
RBC # BLD AUTO: 3.64 10*6/MM3 (ref 4.2–5.4)
RBC # BLD AUTO: 3.8 10*6/MM3 (ref 4.2–5.4)
RBC # UR: ABNORMAL /HPF
REF LAB TEST METHOD: ABNORMAL
SODIUM BLD-SCNC: 140 MMOL/L (ref 135–153)
SP GR UR STRIP: >1.03 (ref 1–1.03)
SQUAMOUS #/AREA URNS HPF: ABNORMAL /HPF
UROBILINOGEN UR QL STRIP: ABNORMAL
WBC NRBC COR # BLD: 5.39 10*3/MM3 (ref 4.5–12.5)
WBC NRBC COR # BLD: 5.51 10*3/MM3 (ref 4.5–12.5)
WBC UR QL AUTO: ABNORMAL /HPF

## 2017-08-28 PROCEDURE — 80053 COMPREHEN METABOLIC PANEL: CPT | Performed by: NURSE PRACTITIONER

## 2017-08-28 PROCEDURE — 81025 URINE PREGNANCY TEST: CPT | Performed by: NURSE PRACTITIONER

## 2017-08-28 PROCEDURE — 87086 URINE CULTURE/COLONY COUNT: CPT | Performed by: NURSE PRACTITIONER

## 2017-08-28 PROCEDURE — 81001 URINALYSIS AUTO W/SCOPE: CPT | Performed by: NURSE PRACTITIONER

## 2017-08-28 PROCEDURE — 85025 COMPLETE CBC W/AUTO DIFF WBC: CPT | Performed by: NURSE PRACTITIONER

## 2017-08-28 PROCEDURE — 85025 COMPLETE CBC W/AUTO DIFF WBC: CPT | Performed by: EMERGENCY MEDICINE

## 2017-08-28 PROCEDURE — 99284 EMERGENCY DEPT VISIT MOD MDM: CPT

## 2017-08-28 PROCEDURE — 74176 CT ABD & PELVIS W/O CONTRAST: CPT

## 2017-08-28 PROCEDURE — 25010000002 ORPHENADRINE CITRATE PER 60 MG: Performed by: EMERGENCY MEDICINE

## 2017-08-28 PROCEDURE — 74176 CT ABD & PELVIS W/O CONTRAST: CPT | Performed by: RADIOLOGY

## 2017-08-28 RX ORDER — ACETAMINOPHEN AND CODEINE PHOSPHATE 300; 30 MG/1; MG/1
1 TABLET ORAL EVERY 6 HOURS PRN
Qty: 15 TABLET | Refills: 0 | Status: SHIPPED | OUTPATIENT
Start: 2017-08-28 | End: 2017-09-18

## 2017-08-28 RX ORDER — PROMETHAZINE HYDROCHLORIDE 25 MG/1
25 TABLET ORAL EVERY 6 HOURS PRN
Qty: 15 TABLET | Refills: 0 | Status: SHIPPED | OUTPATIENT
Start: 2017-08-28 | End: 2017-12-01

## 2017-08-28 RX ORDER — OXYCODONE HYDROCHLORIDE AND ACETAMINOPHEN 5; 325 MG/1; MG/1
1 TABLET ORAL ONCE
Status: COMPLETED | OUTPATIENT
Start: 2017-08-28 | End: 2017-08-28

## 2017-08-28 RX ORDER — ORPHENADRINE CITRATE 30 MG/ML
60 INJECTION INTRAMUSCULAR; INTRAVENOUS ONCE
Status: COMPLETED | OUTPATIENT
Start: 2017-08-28 | End: 2017-08-28

## 2017-08-28 RX ORDER — SODIUM CHLORIDE 0.9 % (FLUSH) 0.9 %
10 SYRINGE (ML) INJECTION AS NEEDED
Status: DISCONTINUED | OUTPATIENT
Start: 2017-08-28 | End: 2017-08-28

## 2017-08-28 RX ORDER — CYCLOBENZAPRINE HCL 10 MG
10 TABLET ORAL 2 TIMES DAILY PRN
Qty: 12 TABLET | Refills: 0 | Status: SHIPPED | OUTPATIENT
Start: 2017-08-28 | End: 2017-12-01

## 2017-08-28 RX ORDER — HYDROCODONE BITARTRATE AND ACETAMINOPHEN 5; 325 MG/1; MG/1
1 TABLET ORAL ONCE
Status: COMPLETED | OUTPATIENT
Start: 2017-08-28 | End: 2017-08-28

## 2017-08-28 RX ADMIN — HYDROCODONE BITARTRATE AND ACETAMINOPHEN 1 TABLET: 5; 325 TABLET ORAL at 14:31

## 2017-08-28 RX ADMIN — OXYCODONE HYDROCHLORIDE AND ACETAMINOPHEN 1 TABLET: 5; 325 TABLET ORAL at 12:22

## 2017-08-28 RX ADMIN — ORPHENADRINE CITRATE 60 MG: 30 INJECTION INTRAMUSCULAR; INTRAVENOUS at 02:07

## 2017-08-28 RX ADMIN — OXYCODONE HYDROCHLORIDE AND ACETAMINOPHEN 1 TABLET: 5; 325 TABLET ORAL at 10:50

## 2017-08-29 ENCOUNTER — HOSPITAL ENCOUNTER (EMERGENCY)
Facility: HOSPITAL | Age: 32
Discharge: LEFT BEFORE DISCHARGE | End: 2017-08-29
Attending: EMERGENCY MEDICINE | Admitting: EMERGENCY MEDICINE

## 2017-08-29 VITALS
WEIGHT: 293 LBS | SYSTOLIC BLOOD PRESSURE: 134 MMHG | TEMPERATURE: 97.6 F | HEIGHT: 64 IN | HEART RATE: 78 BPM | BODY MASS INDEX: 50.02 KG/M2 | RESPIRATION RATE: 16 BRPM | DIASTOLIC BLOOD PRESSURE: 79 MMHG | OXYGEN SATURATION: 96 %

## 2017-08-29 DIAGNOSIS — R10.9 CHRONIC ABDOMINAL PAIN: Primary | ICD-10-CM

## 2017-08-29 DIAGNOSIS — R31.9 HEMATURIA SYNDROME: ICD-10-CM

## 2017-08-29 DIAGNOSIS — G89.29 CHRONIC ABDOMINAL PAIN: Primary | ICD-10-CM

## 2017-08-29 PROCEDURE — 99281 EMR DPT VST MAYX REQ PHY/QHP: CPT

## 2017-08-30 LAB — BACTERIA SPEC AEROBE CULT: NORMAL

## 2017-09-03 ENCOUNTER — HOSPITAL ENCOUNTER (EMERGENCY)
Facility: HOSPITAL | Age: 32
Discharge: HOME | End: 2017-09-03
Attending: EMERGENCY MEDICINE | Admitting: EMERGENCY MEDICINE

## 2017-09-03 VITALS
HEIGHT: 64 IN | HEART RATE: 76 BPM | WEIGHT: 293 LBS | OXYGEN SATURATION: 95 % | TEMPERATURE: 97.9 F | SYSTOLIC BLOOD PRESSURE: 132 MMHG | BODY MASS INDEX: 50.02 KG/M2 | RESPIRATION RATE: 17 BRPM | DIASTOLIC BLOOD PRESSURE: 71 MMHG

## 2017-09-03 DIAGNOSIS — R10.9 CHRONIC RIGHT FLANK PAIN: Primary | ICD-10-CM

## 2017-09-03 DIAGNOSIS — G89.29 CHRONIC RIGHT FLANK PAIN: Primary | ICD-10-CM

## 2017-09-03 LAB
ALBUMIN SERPL-MCNC: 4.4 G/DL (ref 3.2–4.8)
ALBUMIN/GLOB SERPL: 1.3 G/DL (ref 1.5–2.5)
ALP SERPL-CCNC: 77 U/L (ref 25–100)
ALT SERPL W P-5'-P-CCNC: 54 U/L (ref 7–40)
ANION GAP SERPL CALCULATED.3IONS-SCNC: 8 MMOL/L (ref 3–11)
AST SERPL-CCNC: 70 U/L (ref 0–33)
B-HCG UR QL: NEGATIVE
BACTERIA UR QL AUTO: ABNORMAL /HPF
BASOPHILS # BLD AUTO: 0.01 10*3/MM3 (ref 0–0.2)
BASOPHILS NFR BLD AUTO: 0.3 % (ref 0–1)
BILIRUB SERPL-MCNC: 0.4 MG/DL (ref 0.3–1.2)
BILIRUB UR QL STRIP: NEGATIVE
BUN BLD-MCNC: 12 MG/DL (ref 9–23)
BUN/CREAT SERPL: 17.1 (ref 7–25)
CALCIUM SPEC-SCNC: 9.3 MG/DL (ref 8.7–10.4)
CHLORIDE SERPL-SCNC: 103 MMOL/L (ref 99–109)
CLARITY UR: ABNORMAL
CO2 SERPL-SCNC: 27 MMOL/L (ref 20–31)
COLOR UR: YELLOW
CREAT BLD-MCNC: 0.7 MG/DL (ref 0.6–1.3)
DEPRECATED RDW RBC AUTO: 49.5 FL (ref 37–54)
EOSINOPHIL # BLD AUTO: 0.08 10*3/MM3 (ref 0–0.3)
EOSINOPHIL NFR BLD AUTO: 2.4 % (ref 0–3)
ERYTHROCYTE [DISTWIDTH] IN BLOOD BY AUTOMATED COUNT: 16.3 % (ref 11.3–14.5)
GFR SERPL CREATININE-BSD FRML MDRD: 98 ML/MIN/1.73
GLOBULIN UR ELPH-MCNC: 3.5 GM/DL
GLUCOSE BLD-MCNC: 142 MG/DL (ref 70–100)
GLUCOSE UR STRIP-MCNC: ABNORMAL MG/DL
HCT VFR BLD AUTO: 31.1 % (ref 34.5–44)
HGB BLD-MCNC: 9.8 G/DL (ref 11.5–15.5)
HGB UR QL STRIP.AUTO: ABNORMAL
HOLD SPECIMEN: NORMAL
HOLD SPECIMEN: NORMAL
HYALINE CASTS UR QL AUTO: ABNORMAL /LPF
IMM GRANULOCYTES # BLD: 0 10*3/MM3 (ref 0–0.03)
IMM GRANULOCYTES NFR BLD: 0 % (ref 0–0.6)
INR PPP: 0.96
INTERNAL NEGATIVE CONTROL: NEGATIVE
INTERNAL POSITIVE CONTROL: POSITIVE
KETONES UR QL STRIP: NEGATIVE
LEUKOCYTE ESTERASE UR QL STRIP.AUTO: NEGATIVE
LIPASE SERPL-CCNC: 46 U/L (ref 6–51)
LYMPHOCYTES # BLD AUTO: 1.31 10*3/MM3 (ref 0.6–4.8)
LYMPHOCYTES NFR BLD AUTO: 39.9 % (ref 24–44)
Lab: NORMAL
MCH RBC QN AUTO: 26.5 PG (ref 27–31)
MCHC RBC AUTO-ENTMCNC: 31.5 G/DL (ref 32–36)
MCV RBC AUTO: 84.1 FL (ref 80–99)
MONOCYTES # BLD AUTO: 0.33 10*3/MM3 (ref 0–1)
MONOCYTES NFR BLD AUTO: 10.1 % (ref 0–12)
NEUTROPHILS # BLD AUTO: 1.55 10*3/MM3 (ref 1.5–8.3)
NEUTROPHILS NFR BLD AUTO: 47.3 % (ref 41–71)
NITRITE UR QL STRIP: NEGATIVE
PH UR STRIP.AUTO: <=5 [PH] (ref 5–8)
PLATELET # BLD AUTO: 207 10*3/MM3 (ref 150–450)
PMV BLD AUTO: 8.8 FL (ref 6–12)
POTASSIUM BLD-SCNC: 3.4 MMOL/L (ref 3.5–5.5)
PROT SERPL-MCNC: 7.9 G/DL (ref 5.7–8.2)
PROT UR QL STRIP: NEGATIVE
PROTHROMBIN TIME: 10.5 SECONDS (ref 9.6–11.5)
RBC # BLD AUTO: 3.7 10*6/MM3 (ref 3.89–5.14)
RBC # UR: ABNORMAL /HPF
REF LAB TEST METHOD: ABNORMAL
SODIUM BLD-SCNC: 138 MMOL/L (ref 132–146)
SP GR UR STRIP: 1.04 (ref 1–1.03)
SQUAMOUS #/AREA URNS HPF: ABNORMAL /HPF
UROBILINOGEN UR QL STRIP: ABNORMAL
WBC NRBC COR # BLD: 3.28 10*3/MM3 (ref 3.5–10.8)
WBC UR QL AUTO: ABNORMAL /HPF
WHOLE BLOOD HOLD SPECIMEN: NORMAL
WHOLE BLOOD HOLD SPECIMEN: NORMAL

## 2017-09-03 PROCEDURE — 99284 EMERGENCY DEPT VISIT MOD MDM: CPT

## 2017-09-03 PROCEDURE — 85025 COMPLETE CBC W/AUTO DIFF WBC: CPT | Performed by: EMERGENCY MEDICINE

## 2017-09-03 PROCEDURE — 96360 HYDRATION IV INFUSION INIT: CPT

## 2017-09-03 PROCEDURE — 81001 URINALYSIS AUTO W/SCOPE: CPT | Performed by: EMERGENCY MEDICINE

## 2017-09-03 PROCEDURE — 83690 ASSAY OF LIPASE: CPT | Performed by: EMERGENCY MEDICINE

## 2017-09-03 PROCEDURE — 85610 PROTHROMBIN TIME: CPT | Performed by: NURSE PRACTITIONER

## 2017-09-03 PROCEDURE — 80053 COMPREHEN METABOLIC PANEL: CPT | Performed by: EMERGENCY MEDICINE

## 2017-09-03 RX ORDER — SODIUM CHLORIDE 0.9 % (FLUSH) 0.9 %
10 SYRINGE (ML) INJECTION AS NEEDED
Status: DISCONTINUED | OUTPATIENT
Start: 2017-09-03 | End: 2017-09-03 | Stop reason: HOSPADM

## 2017-09-03 RX ORDER — ACETAMINOPHEN AND CODEINE PHOSPHATE 300; 30 MG/1; MG/1
1 TABLET ORAL EVERY 6 HOURS PRN
Qty: 10 TABLET | Refills: 0 | Status: SHIPPED | OUTPATIENT
Start: 2017-09-03 | End: 2017-09-18

## 2017-09-03 RX ORDER — HYDROCODONE BITARTRATE AND ACETAMINOPHEN 7.5; 325 MG/1; MG/1
1 TABLET ORAL ONCE
Status: COMPLETED | OUTPATIENT
Start: 2017-09-03 | End: 2017-09-03

## 2017-09-03 RX ADMIN — HYDROCODONE BITARTRATE AND ACETAMINOPHEN 1 TABLET: 7.5; 325 TABLET ORAL at 10:10

## 2017-09-03 RX ADMIN — SODIUM CHLORIDE 1000 ML: 9 INJECTION, SOLUTION INTRAVENOUS at 10:17

## 2017-09-03 NOTE — ED PROVIDER NOTES
Subjective   HPI Comments: Patient presents to the emergency department with complaint of right flank pain that began last night.  Patient states she recognizes this flank pain is similar in presentation in intensity to previous occasions of flank pain associated with kidney stone.  Further inquiry reveals that the patient states she was diagnosed with kidney stones on the right side approximately 2 weeks ago.  Further inquiry reveals that the patient has indeed been evaluated for kidney stones and flank pain and abdominal pain multiple times in the last 60 days.  More specifically, she has had several medical evaluations since June July and August most recently, on August 28 she had 2 visits to the emergency department at [place] where she had the most recent of 6 CT scans of her abdomen and pelvis.    Evaluation of her medical history and chart review shows that patient has not had any obstructive stones, hydronephros, stones in the urethra or pyelonephritis as evidenced by 7 urine cultures since July 24 are showing normal genital hafsa or no growth.  Her urinalyses consistently show hematuria for which she has been advised to see urology.  She also has a well-documented ovarian cyst on the right both by CT scan and ultrasound for which she has been advised to follow-up with gynecology.  Patient is most anxious about her pain control measures today and she has agreed that her previous admonitions to outpatient management with gynecology and urology.  She agrees that her presenting complaints today are not unlike the complaints which have prompted the forementioned evaluations.            History provided by:  Patient (boyfriend)   used: No        Review of Systems   Constitutional: Negative.  Negative for fever.   HENT: Negative.    Eyes: Negative.    Gastrointestinal: Positive for abdominal pain (affirm that her pain today is NOT in the abdomen, but her back). Negative for diarrhea, nausea and  "vomiting.   Genitourinary: Positive for dysuria, flank pain and hematuria. Negative for decreased urine volume, pelvic pain, vaginal bleeding, vaginal discharge and vaginal pain.   Musculoskeletal:        Right flank/flank pain.  \"very bad\"      All other systems reviewed and are negative.      Past Medical History:   Diagnosis Date   • Depression    • Diabetes mellitus    • DVT (deep venous thrombosis)    • GERD (gastroesophageal reflux disease)    • Gout    • Hyperlipidemia    • Hypertension    • Hypothyroid    • Kidney stone    • Migraine    • Neuropathy    • PE (pulmonary embolism)        Allergies   Allergen Reactions   • Amoxicillin Hives   • Penicillins Hives   • Toradol [Ketorolac Tromethamine] Hives       Past Surgical History:   Procedure Laterality Date   • CARDIAC SURGERY      Heart Cath done in    •  SECTION     • CHOLECYSTECTOMY     • COLONOSCOPY         Family History   Problem Relation Age of Onset   • No Known Problems Mother    • No Known Problems Father    • No Known Problems Sister    • No Known Problems Brother    • No Known Problems Son    • No Known Problems Daughter    • No Known Problems Maternal Grandmother    • No Known Problems Maternal Grandfather    • No Known Problems Paternal Grandmother    • No Known Problems Paternal Grandfather    • No Known Problems Cousin    • Rheum arthritis Neg Hx    • Osteoarthritis Neg Hx    • Asthma Neg Hx    • Diabetes Neg Hx    • Heart failure Neg Hx    • Hyperlipidemia Neg Hx    • Hypertension Neg Hx    • Migraines Neg Hx    • Rashes / Skin problems Neg Hx    • Seizures Neg Hx    • Stroke Neg Hx    • Thyroid disease Neg Hx        Social History     Social History   • Marital status:      Spouse name: N/A   • Number of children: N/A   • Years of education: N/A     Social History Main Topics   • Smoking status: Never Smoker   • Smokeless tobacco: None   • Alcohol use No   • Drug use: No   • Sexual activity: Defer     Other Topics Concern "   • None     Social History Narrative           Objective   Physical Exam   Constitutional: She is oriented to person, place, and time. She appears well-developed and well-nourished. No distress.   Morbidly obese     HENT:   Head: Normocephalic and atraumatic.   Mouth/Throat: Oropharynx is clear and moist.   Eyes: EOM are normal. Pupils are equal, round, and reactive to light.   Neck: Normal range of motion. Neck supple.   Cardiovascular: Normal rate and regular rhythm.    Pulmonary/Chest: Effort normal and breath sounds normal. No respiratory distress. She has no wheezes. She has no rales. She exhibits no tenderness.   Abdominal: Soft. Bowel sounds are normal. She exhibits no distension. There is no tenderness. There is no rebound and no guarding.   Musculoskeletal: Normal range of motion. She exhibits no edema or deformity.   CVA tenderness    Lymphadenopathy:     She has no cervical adenopathy.   Neurological: She is alert and oriented to person, place, and time.   Skin: Skin is warm and dry. No rash noted. She is not diaphoretic. No erythema. No pallor.   Psychiatric: She has a normal mood and affect. Her behavior is normal.   Depressed affect.      Nursing note and vitals reviewed.      Procedures         ED Course  ED Course   Comment By Time   After reviewing this matter further with Dr. Urena year, the patient and her boyfriend concur to follow-up with outpatient management.  I have suggested that a catheterized urine sample may be helpful to rule out sources of hematuria that are consistently shown on urinalysis.  Patient chooses to defer that evaluation for now.  We will meet her need for pain control for this weekend while she agrees to follow-up as aforementioned.  It is also noted that the leukopenia noted on her CBC is a stable finding Gunjan Valle, APRN 09/03 1103   Patient review of medications: Patient is using Coumadin.  Not xarelto (which is mentioned in her medication list)  INR 0.9.  None  of her CT scans have been positive for retroperitoneal bleeding. YEIMI Castañeda 09/03 1106                  [facility]    Final diagnoses:   Chronic right flank pain        YEIMI Castañeda  09/03/17 1102

## 2017-09-03 NOTE — DISCHARGE INSTRUCTIONS
Maintain plan of care for follow up with urology for hematuria syndrome (blood in the urine frequently) .  I have provided you the name of our urologist here in [place] as well.  Also follow up with your GYNECOLOGY workup regarding management of ovarian cyst.  Return to the ER as needed for worsening symptoms or concerns.   Follow up with your primary care provider to monitor your blood work routinely due to the lower white blood cell count that you have had long tern.   Thank you and have a good weekend.

## 2017-09-17 PROCEDURE — 99284 EMERGENCY DEPT VISIT MOD MDM: CPT

## 2017-09-18 ENCOUNTER — HOSPITAL ENCOUNTER (EMERGENCY)
Facility: HOSPITAL | Age: 32
Discharge: HOME | End: 2017-09-18
Attending: EMERGENCY MEDICINE | Admitting: EMERGENCY MEDICINE

## 2017-09-18 ENCOUNTER — APPOINTMENT (OUTPATIENT)
Dept: GENERAL RADIOLOGY | Facility: HOSPITAL | Age: 32
End: 2017-09-18

## 2017-09-18 VITALS
RESPIRATION RATE: 18 BRPM | BODY MASS INDEX: 50.02 KG/M2 | HEART RATE: 82 BPM | SYSTOLIC BLOOD PRESSURE: 132 MMHG | OXYGEN SATURATION: 100 % | DIASTOLIC BLOOD PRESSURE: 84 MMHG | HEIGHT: 64 IN | TEMPERATURE: 98.2 F | WEIGHT: 293 LBS

## 2017-09-18 DIAGNOSIS — S61.211A LACERATION OF LEFT INDEX FINGER: Primary | ICD-10-CM

## 2017-09-18 PROCEDURE — 25010000002 CEFTRIAXONE PER 250 MG: Performed by: EMERGENCY MEDICINE

## 2017-09-18 PROCEDURE — 90471 IMMUNIZATION ADMIN: CPT | Performed by: EMERGENCY MEDICINE

## 2017-09-18 PROCEDURE — 73140 X-RAY EXAM OF FINGER(S): CPT | Performed by: RADIOLOGY

## 2017-09-18 PROCEDURE — 90715 TDAP VACCINE 7 YRS/> IM: CPT | Performed by: EMERGENCY MEDICINE

## 2017-09-18 PROCEDURE — 73140 X-RAY EXAM OF FINGER(S): CPT

## 2017-09-18 PROCEDURE — 25010000002 TDAP 5-2.5-18.5 LF-MCG/0.5 SUSPENSION: Performed by: EMERGENCY MEDICINE

## 2017-09-18 PROCEDURE — 96372 THER/PROPH/DIAG INJ SC/IM: CPT

## 2017-09-18 RX ORDER — ACETAMINOPHEN AND CODEINE PHOSPHATE 300; 30 MG/1; MG/1
1 TABLET ORAL EVERY 6 HOURS PRN
Qty: 8 TABLET | Refills: 0 | Status: SHIPPED | OUTPATIENT
Start: 2017-09-18 | End: 2017-12-01

## 2017-09-18 RX ORDER — BACITRACIN, NEOMYCIN, POLYMYXIN B 400; 3.5; 5 [USP'U]/G; MG/G; [USP'U]/G
OINTMENT TOPICAL ONCE
Status: DISCONTINUED | OUTPATIENT
Start: 2017-09-18 | End: 2017-09-18 | Stop reason: HOSPADM

## 2017-09-18 RX ORDER — MAGNESIUM HYDROXIDE 1200 MG/15ML
LIQUID ORAL
Status: DISCONTINUED
Start: 2017-09-18 | End: 2017-09-18 | Stop reason: HOSPADM

## 2017-09-18 RX ORDER — LIDOCAINE HYDROCHLORIDE 10 MG/ML
2.1 INJECTION, SOLUTION EPIDURAL; INFILTRATION; INTRACAUDAL; PERINEURAL ONCE
Status: COMPLETED | OUTPATIENT
Start: 2017-09-18 | End: 2017-09-18

## 2017-09-18 RX ORDER — ACETAMINOPHEN AND CODEINE PHOSPHATE 300; 30 MG/1; MG/1
2 TABLET ORAL ONCE
Status: DISCONTINUED | OUTPATIENT
Start: 2017-09-18 | End: 2017-09-18 | Stop reason: HOSPADM

## 2017-09-18 RX ORDER — LIDOCAINE HYDROCHLORIDE 10 MG/ML
INJECTION, SOLUTION INFILTRATION; PERINEURAL
Status: COMPLETED
Start: 2017-09-18 | End: 2017-09-18

## 2017-09-18 RX ORDER — CEPHALEXIN 500 MG/1
500 CAPSULE ORAL 3 TIMES DAILY
Qty: 21 CAPSULE | Refills: 0 | Status: SHIPPED | OUTPATIENT
Start: 2017-09-18 | End: 2017-12-01

## 2017-09-18 RX ORDER — CEFTRIAXONE 1 G/1
1000 INJECTION, POWDER, FOR SOLUTION INTRAMUSCULAR; INTRAVENOUS ONCE
Status: COMPLETED | OUTPATIENT
Start: 2017-09-18 | End: 2017-09-18

## 2017-09-18 RX ADMIN — BACITRACIN, NEOMYCIN, POLYMYXIN B 1 APPLICATION: 400; 3.5; 5 OINTMENT TOPICAL at 01:47

## 2017-09-18 RX ADMIN — LIDOCAINE HYDROCHLORIDE 2.1 ML: 10 INJECTION, SOLUTION INFILTRATION; PERINEURAL at 01:03

## 2017-09-18 RX ADMIN — ACETAMINOPHEN AND CODEINE PHOSPHATE 2 TABLET: 30; 300 TABLET ORAL at 01:42

## 2017-09-18 RX ADMIN — CEFTRIAXONE 1000 MG: 1 INJECTION, POWDER, FOR SOLUTION INTRAMUSCULAR; INTRAVENOUS at 01:03

## 2017-09-18 RX ADMIN — TETANUS TOXOID, REDUCED DIPHTHERIA TOXOID AND ACELLULAR PERTUSSIS VACCINE, ADSORBED 0.5 ML: 5; 2.5; 8; 8; 2.5 SUSPENSION INTRAMUSCULAR at 00:59

## 2017-09-18 RX ADMIN — LIDOCAINE HYDROCHLORIDE 2.1 ML: 10 INJECTION, SOLUTION EPIDURAL; INFILTRATION; INTRACAUDAL; PERINEURAL at 01:03

## 2017-09-18 NOTE — ED NOTES
Dressing applied to L index finger consisting of 4x4 gauze and inocencio.      Debbi Fisher RN  09/18/17 7233

## 2017-09-18 NOTE — ED PROVIDER NOTES
"Subjective   HPI Comments: Patient is 32-year-old white female who presents here tonight complaining of a laceration to her left index finger that she sustained just prior to arrival.  She reports that she was using a  knife to open a total bili that was tightly encased in a plastic for her child.  She denies other symptoms, other injuries or complaints.  She states that the  knife \"had a little rust on it\".  She reports that it has been 5 years or more since her last tetanus immunization.    Patient is a 32 y.o. female presenting with upper extremity pain.   History provided by:  Patient  Upper Extremity Issue   Associated symptoms: no back pain, no fever, no neck pain, no numbness, no swelling and no tingling        Review of Systems   Constitutional: Negative for chills and fever.   HENT: Negative for congestion and sinus pressure.    Eyes: Negative for photophobia and pain.   Respiratory: Negative for shortness of breath.    Cardiovascular: Negative for chest pain.   Gastrointestinal: Negative for abdominal pain and vomiting.   Endocrine: Negative for polydipsia and polyphagia.   Genitourinary: Negative for difficulty urinating and flank pain.   Musculoskeletal: Negative for back pain and neck pain.   Neurological: Negative for seizures and syncope.   Psychiatric/Behavioral: Negative for confusion.   All other systems reviewed and are negative.      Past Medical History:   Diagnosis Date   • Depression    • Diabetes mellitus    • DVT (deep venous thrombosis)    • GERD (gastroesophageal reflux disease)    • Gout    • Hyperlipidemia    • Hypertension    • Hypothyroid    • Kidney stone    • Migraine    • Neuropathy    • PE (pulmonary embolism)        Allergies   Allergen Reactions   • Amoxicillin Hives   • Penicillins Hives   • Toradol [Ketorolac Tromethamine] Hives       Past Surgical History:   Procedure Laterality Date   • CARDIAC SURGERY      Heart Cath done in    •  SECTION     • " CHOLECYSTECTOMY     • COLONOSCOPY         Family History   Problem Relation Age of Onset   • No Known Problems Mother    • No Known Problems Father    • No Known Problems Sister    • No Known Problems Brother    • No Known Problems Son    • No Known Problems Daughter    • No Known Problems Maternal Grandmother    • No Known Problems Maternal Grandfather    • No Known Problems Paternal Grandmother    • No Known Problems Paternal Grandfather    • No Known Problems Cousin    • Rheum arthritis Neg Hx    • Osteoarthritis Neg Hx    • Asthma Neg Hx    • Diabetes Neg Hx    • Heart failure Neg Hx    • Hyperlipidemia Neg Hx    • Hypertension Neg Hx    • Migraines Neg Hx    • Rashes / Skin problems Neg Hx    • Seizures Neg Hx    • Stroke Neg Hx    • Thyroid disease Neg Hx        Social History     Social History   • Marital status:      Spouse name: N/A   • Number of children: N/A   • Years of education: N/A     Social History Main Topics   • Smoking status: Never Smoker   • Smokeless tobacco: None   • Alcohol use No   • Drug use: No   • Sexual activity: Defer     Other Topics Concern   • None     Social History Narrative           Objective   Physical Exam   Constitutional: She is oriented to person, place, and time. She appears well-developed and well-nourished. No distress.   HENT:   Head: Normocephalic and atraumatic.   Eyes: EOM are normal. Pupils are equal, round, and reactive to light. No scleral icterus.   Neck: Normal range of motion. Neck supple. No rigidity. No tracheal deviation present.   Cardiovascular: Normal rate, regular rhythm and intact distal pulses.    Pulmonary/Chest: Effort normal and breath sounds normal. No respiratory distress. She exhibits no tenderness.   Abdominal: Soft. Bowel sounds are normal. There is no tenderness. There is no rebound and no guarding.   Musculoskeletal: Normal range of motion.   Examination of the left index finger reveals a somewhat irregular, 1/2 cm laceration to the  lateral aspect of the DIP joint; it extends slightly dorsally and slightly volarly.  Sensation on the lateral aspect of the finger just distal to the wound is perhaps slightly diminished, otherwise with distal neurovascular intact and range of motion intact.  There is local tenderness at the wound but there is no bony tenderness or deformity.  The extremities are otherwise nontender and atraumatic.   Neurological: She is alert and oriented to person, place, and time. She has normal strength. No sensory deficit. She exhibits normal muscle tone. Coordination normal. GCS eye subscore is 4. GCS verbal subscore is 5. GCS motor subscore is 6.   Skin: Skin is warm and dry. She is not diaphoretic. No cyanosis. No pallor.   Psychiatric: She has a normal mood and affect. Her behavior is normal.   Vitals reviewed.      Laceration repair  Date/Time: 9/18/2017 1:24 AM  Performed by: DUONG VALLADARES  Authorized by: DUONG VALLADARES   Consent: Verbal consent obtained.  Consent given by: patient  Body area: upper extremity  Location details: left index finger  Laceration length: 1.5 cm  Contaminated: Clean.  Foreign bodies: no foreign bodies  Tendon involvement: none  Vascular damage: no  Anesthesia: digital block    Anesthesia:  Local Anesthetic: lidocaine 1% without epinephrine  Anesthetic total: 2.5 mL    Sedation:  Patient sedated: no  Preparation: Patient was prepped and draped in the usual sterile fashion.  Irrigation solution: saline  Irrigation method: syringe  Amount of cleaning: standard  Debridement: none  Degree of undermining: none  Skin closure: 4-0 nylon  Number of sutures: 5  Technique: simple  Approximation: close  Approximation difficulty: simple  Dressing: Neosporin/Adaptic/Cesar.  Patient tolerance: Patient tolerated the procedure well with no immediate complications  Comments: Excellent anesthesia was obtained with a digital block.  Patient tolerated the procedure without difficulty.  Excellent wound  reapproximation and hemostasis was achieved.        XR Finger 2+ View Left   ED Interpretation   No apparent acute bony abnormalities pending radiologist.                 ED Course  ED Course   Comment By Time   Patient's emergency department course was entirely uncomplicated.  Patient repeatedly requested pain medications, as she has a history of doing here in this department, even though excellent anesthesia was obtained with the digital block.  She tolerated procedure without difficulty.  She has shown absolutely no signs of distress.  She received intramuscular Rocephin as well as a tetanus booster.  She will be discharged home with a limited number of Tylenol No. 3 and with a week's worth of Keflex 500 3 times a day. Esvin Narayanan MD 09/18 0146                  [facility]    Final diagnoses:   Laceration of left index finger             Please note that portions of this note were completed with a voice recognition program. Efforts were made to edit the dictations, but occasionally words are mistranscribed.       Esvin Narayanan MD  09/18/17 0203

## 2017-09-18 NOTE — DISCHARGE INSTRUCTIONS
Home in care of family.  Gently cleanse the wound with soap and water and apply Neosporin and a clean dressing 3 times daily.  Medications as prescribed.  Sutures out in approximately 10 days at Dr. Goodman's discretion; see him in 2 days for a wound check.  Return to the emergency department if any problems.    Hypertension  Hypertension, commonly called high blood pressure, is when the force of blood pumping through your arteries is too strong. Your arteries are the blood vessels that carry blood from your heart throughout your body. A blood pressure reading consists of a higher number over a lower number, such as 110/72. The higher number (systolic) is the pressure inside your arteries when your heart pumps. The lower number (diastolic) is the pressure inside your arteries when your heart relaxes. Ideally you want your blood pressure below 120/80.  Hypertension forces your heart to work harder to pump blood. Your arteries may become narrow or stiff. Having untreated or uncontrolled hypertension can cause heart attack, stroke, kidney disease, and other problems.  RISK FACTORS  Some risk factors for high blood pressure are controllable. Others are not.   Risk factors you cannot control include:   · Race. You may be at higher risk if you are .  · Age. Risk increases with age.  · Gender. Men are at higher risk than women before age 45 years. After age 65, women are at higher risk than men.  Risk factors you can control include:  · Not getting enough exercise or physical activity.  · Being overweight.  · Getting too much fat, sugar, calories, or salt in your diet.  · Drinking too much alcohol.  SIGNS AND SYMPTOMS  Hypertension does not usually cause signs or symptoms. Extremely high blood pressure (hypertensive crisis) may cause headache, anxiety, shortness of breath, and nosebleed.  DIAGNOSIS  To check if you have hypertension, your health care provider will measure your blood pressure while you are  seated, with your arm held at the level of your heart. It should be measured at least twice using the same arm. Certain conditions can cause a difference in blood pressure between your right and left arms. A blood pressure reading that is higher than normal on one occasion does not mean that you need treatment. If it is not clear whether you have high blood pressure, you may be asked to return on a different day to have your blood pressure checked again. Or, you may be asked to monitor your blood pressure at home for 1 or more weeks.  TREATMENT  Treating high blood pressure includes making lifestyle changes and possibly taking medicine. Living a healthy lifestyle can help lower high blood pressure. You may need to change some of your habits.  Lifestyle changes may include:  · Following the DASH diet. This diet is high in fruits, vegetables, and whole grains. It is low in salt, red meat, and added sugars.  · Keep your sodium intake below 2,300 mg per day.  · Getting at least 30-45 minutes of aerobic exercise at least 4 times per week.  · Losing weight if necessary.  · Not smoking.  · Limiting alcoholic beverages.  · Learning ways to reduce stress.  Your health care provider may prescribe medicine if lifestyle changes are not enough to get your blood pressure under control, and if one of the following is true:  · You are 18-59 years of age and your systolic blood pressure is above 140.  · You are 60 years of age or older, and your systolic blood pressure is above 150.  · Your diastolic blood pressure is above 90.  · You have diabetes, and your systolic blood pressure is over 140 or your diastolic blood pressure is over 90.  · You have kidney disease and your blood pressure is above 140/90.  · You have heart disease and your blood pressure is above 140/90.  Your personal target blood pressure may vary depending on your medical conditions, your age, and other factors.  HOME CARE INSTRUCTIONS  · Have your blood pressure  rechecked as directed by your health care provider.    · Take medicines only as directed by your health care provider. Follow the directions carefully. Blood pressure medicines must be taken as prescribed. The medicine does not work as well when you skip doses. Skipping doses also puts you at risk for problems.  · Do not smoke.    · Monitor your blood pressure at home as directed by your health care provider.   SEEK MEDICAL CARE IF:   · You think you are having a reaction to medicines taken.  · You have recurrent headaches or feel dizzy.  · You have swelling in your ankles.  · You have trouble with your vision.  SEEK IMMEDIATE MEDICAL CARE IF:  · You develop a severe headache or confusion.  · You have unusual weakness, numbness, or feel faint.  · You have severe chest or abdominal pain.  · You vomit repeatedly.  · You have trouble breathing.  MAKE SURE YOU:   · Understand these instructions.  · Will watch your condition.  · Will get help right away if you are not doing well or get worse.     This information is not intended to replace advice given to you by your health care provider. Make sure you discuss any questions you have with your health care provider.     Document Released: 12/18/2006 Document Revised: 05/03/2016 Document Reviewed: 10/10/2014  [employer] Patient Education ©2017 [employer]

## 2017-09-23 ENCOUNTER — APPOINTMENT (OUTPATIENT)
Dept: ULTRASOUND IMAGING | Facility: HOSPITAL | Age: 32
End: 2017-09-23

## 2017-09-23 ENCOUNTER — HOSPITAL ENCOUNTER (EMERGENCY)
Facility: HOSPITAL | Age: 32
Discharge: HOME | End: 2017-09-23
Attending: EMERGENCY MEDICINE | Admitting: EMERGENCY MEDICINE

## 2017-09-23 VITALS
RESPIRATION RATE: 18 BRPM | SYSTOLIC BLOOD PRESSURE: 136 MMHG | HEART RATE: 71 BPM | BODY MASS INDEX: 50.02 KG/M2 | WEIGHT: 293 LBS | DIASTOLIC BLOOD PRESSURE: 86 MMHG | HEIGHT: 64 IN | TEMPERATURE: 98.2 F | OXYGEN SATURATION: 95 %

## 2017-09-23 DIAGNOSIS — R10.9 CHRONIC FLANK PAIN: Primary | ICD-10-CM

## 2017-09-23 DIAGNOSIS — R31.9 HEMATURIA: ICD-10-CM

## 2017-09-23 DIAGNOSIS — G89.29 CHRONIC FLANK PAIN: Primary | ICD-10-CM

## 2017-09-23 LAB
ALBUMIN SERPL-MCNC: 4.8 G/DL (ref 3.5–5)
ALBUMIN/GLOB SERPL: 1.3 G/DL (ref 1–2)
ALP SERPL-CCNC: 78 U/L (ref 38–126)
ALT SERPL W P-5'-P-CCNC: 70 U/L (ref 13–69)
ANION GAP SERPL CALCULATED.3IONS-SCNC: 22.7 MMOL/L
AST SERPL-CCNC: 85 U/L (ref 15–46)
B-HCG UR QL: NEGATIVE
BACTERIA UR QL AUTO: ABNORMAL /HPF
BASOPHILS # BLD AUTO: 0.02 10*3/MM3 (ref 0–0.2)
BASOPHILS NFR BLD AUTO: 0.5 % (ref 0–2.5)
BILIRUB SERPL-MCNC: 0.5 MG/DL (ref 0.2–1.3)
BILIRUB UR QL STRIP: ABNORMAL
BUN BLD-MCNC: 19 MG/DL (ref 7–20)
BUN/CREAT SERPL: 23.8 (ref 7.1–23.5)
CALCIUM SPEC-SCNC: 10 MG/DL (ref 8.4–10.2)
CHLORIDE SERPL-SCNC: 106 MMOL/L (ref 98–107)
CLARITY UR: ABNORMAL
CO2 SERPL-SCNC: 20 MMOL/L (ref 26–30)
COLOR UR: ABNORMAL
CREAT BLD-MCNC: 0.8 MG/DL (ref 0.6–1.3)
DEPRECATED RDW RBC AUTO: 46.5 FL (ref 37–54)
EOSINOPHIL # BLD AUTO: 0.1 10*3/MM3 (ref 0–0.7)
EOSINOPHIL NFR BLD AUTO: 2.3 % (ref 0–7)
ERYTHROCYTE [DISTWIDTH] IN BLOOD BY AUTOMATED COUNT: 15.9 % (ref 11.5–14.5)
GFR SERPL CREATININE-BSD FRML MDRD: 84 ML/MIN/1.73
GLOBULIN UR ELPH-MCNC: 3.7 GM/DL
GLUCOSE BLD-MCNC: 128 MG/DL (ref 74–98)
GLUCOSE UR STRIP-MCNC: ABNORMAL MG/DL
HCT VFR BLD AUTO: 33.1 % (ref 37–47)
HGB BLD-MCNC: 10.1 G/DL (ref 12–16)
HGB UR QL STRIP.AUTO: ABNORMAL
HYALINE CASTS UR QL AUTO: ABNORMAL /LPF
IMM GRANULOCYTES # BLD: 0.01 10*3/MM3 (ref 0–0.06)
IMM GRANULOCYTES NFR BLD: 0.2 % (ref 0–0.6)
INR PPP: 2.43 (ref 0.9–1.1)
KETONES UR QL STRIP: ABNORMAL
LEUKOCYTE ESTERASE UR QL STRIP.AUTO: NEGATIVE
LIPASE SERPL-CCNC: 81 U/L (ref 23–300)
LYMPHOCYTES # BLD AUTO: 1.95 10*3/MM3 (ref 0.6–3.4)
LYMPHOCYTES NFR BLD AUTO: 44.5 % (ref 10–50)
MCH RBC QN AUTO: 24.6 PG (ref 27–31)
MCHC RBC AUTO-ENTMCNC: 30.5 G/DL (ref 30–37)
MCV RBC AUTO: 80.7 FL (ref 81–99)
MONOCYTES # BLD AUTO: 0.55 10*3/MM3 (ref 0–0.9)
MONOCYTES NFR BLD AUTO: 12.6 % (ref 0–12)
NEUTROPHILS # BLD AUTO: 1.75 10*3/MM3 (ref 2–6.9)
NEUTROPHILS NFR BLD AUTO: 39.9 % (ref 37–80)
NITRITE UR QL STRIP: POSITIVE
NRBC BLD MANUAL-RTO: 0 /100 WBC (ref 0–0)
PH UR STRIP.AUTO: <=5 [PH] (ref 5–8)
PLATELET # BLD AUTO: 249 10*3/MM3 (ref 130–400)
PMV BLD AUTO: 9.4 FL (ref 6–12)
POTASSIUM BLD-SCNC: 3.7 MMOL/L (ref 3.5–5.1)
PROT SERPL-MCNC: 8.5 G/DL (ref 6.3–8.2)
PROT UR QL STRIP: ABNORMAL
PROTHROMBIN TIME: 26.6 SECONDS (ref 9.3–12.1)
RBC # BLD AUTO: 4.1 10*6/MM3 (ref 4.2–5.4)
RBC # UR: ABNORMAL /HPF
REF LAB TEST METHOD: ABNORMAL
SODIUM BLD-SCNC: 145 MMOL/L (ref 137–145)
SP GR UR STRIP: >=1.03 (ref 1–1.03)
SQUAMOUS #/AREA URNS HPF: ABNORMAL /HPF
UROBILINOGEN UR QL STRIP: ABNORMAL
WBC NRBC COR # BLD: 4.38 10*3/MM3 (ref 4.8–10.8)
WBC UR QL AUTO: ABNORMAL /HPF

## 2017-09-23 PROCEDURE — 81025 URINE PREGNANCY TEST: CPT | Performed by: EMERGENCY MEDICINE

## 2017-09-23 PROCEDURE — 85025 COMPLETE CBC W/AUTO DIFF WBC: CPT | Performed by: EMERGENCY MEDICINE

## 2017-09-23 PROCEDURE — 81001 URINALYSIS AUTO W/SCOPE: CPT | Performed by: EMERGENCY MEDICINE

## 2017-09-23 PROCEDURE — 93971 EXTREMITY STUDY: CPT

## 2017-09-23 PROCEDURE — 76775 US EXAM ABDO BACK WALL LIM: CPT

## 2017-09-23 PROCEDURE — 87086 URINE CULTURE/COLONY COUNT: CPT | Performed by: EMERGENCY MEDICINE

## 2017-09-23 PROCEDURE — 83690 ASSAY OF LIPASE: CPT | Performed by: EMERGENCY MEDICINE

## 2017-09-23 PROCEDURE — 96360 HYDRATION IV INFUSION INIT: CPT

## 2017-09-23 PROCEDURE — 80053 COMPREHEN METABOLIC PANEL: CPT | Performed by: EMERGENCY MEDICINE

## 2017-09-23 PROCEDURE — 99284 EMERGENCY DEPT VISIT MOD MDM: CPT

## 2017-09-23 PROCEDURE — 85610 PROTHROMBIN TIME: CPT | Performed by: EMERGENCY MEDICINE

## 2017-09-23 RX ORDER — ACETAMINOPHEN 500 MG
1000 TABLET ORAL ONCE
Status: COMPLETED | OUTPATIENT
Start: 2017-09-23 | End: 2017-09-23

## 2017-09-23 RX ORDER — SODIUM CHLORIDE 0.9 % (FLUSH) 0.9 %
10 SYRINGE (ML) INJECTION AS NEEDED
Status: DISCONTINUED | OUTPATIENT
Start: 2017-09-23 | End: 2017-09-23 | Stop reason: HOSPADM

## 2017-09-23 RX ADMIN — SODIUM CHLORIDE 1000 ML: 9 INJECTION, SOLUTION INTRAVENOUS at 08:08

## 2017-09-23 RX ADMIN — ACETAMINOPHEN 1000 MG: 500 TABLET, FILM COATED ORAL at 08:08

## 2017-09-23 NOTE — DISCHARGE INSTRUCTIONS
Please follow-up with your urologist on Monday for further evaluation and treatment.  Follow-up with her primary care doctor for further management of your pain.  If you feel worse or have any concerns return to the ER.

## 2017-09-23 NOTE — ED PROVIDER NOTES
Subjective   History of Present Illness  TRIAGE CHIEF COMPLAINT:   Chief Complaint   Patient presents with   • Flank Pain   • Leg Pain         HPI: Natalia Arzate   is a 32 y.o. female   who presents to the emergency department complaining of Right flank pain and right calf pain.  Patient with history of hypertension, diabetes, dyslipidemia, DVT/PE, hypothyroidism, kidney stones, morbid obesity.  Describes onset of right flank pain yesterday evening.  This pain is constant and nonradiating.  She has attempted to take Tylenol and Motrin without adequate relief.  Patient also describes intermittent right calf pain for the past 1-2 days which makes her concerned for the possibility of recurrent DVT.  Patient states she is compliant with her Coumadin but has not had levels checked in a few weeks.  Review of systems noteworthy for gross hematuria which she has a history of in the past and is currently being followed by a urologist named Dr. Rizzo.  Denies fever, chills, chest pain, shortness of breath, nausea, vomiting, diarrhea, unilateral pedal edema.            Review of Systems   All other systems reviewed and are negative.      Past Medical History:   Diagnosis Date   • Depression    • Diabetes mellitus    • DVT (deep venous thrombosis)    • GERD (gastroesophageal reflux disease)    • Gout    • Hyperlipidemia    • Hypertension    • Hypothyroid    • Kidney stone    • Migraine    • Neuropathy    • PE (pulmonary embolism)        Allergies   Allergen Reactions   • Amoxicillin Hives   • Penicillins Hives   • Toradol [Ketorolac Tromethamine] Hives       Past Surgical History:   Procedure Laterality Date   • CARDIAC SURGERY      Heart Cath done in    •  SECTION     • CHOLECYSTECTOMY     • COLONOSCOPY         Family History   Problem Relation Age of Onset   • No Known Problems Mother    • No Known Problems Father    • No Known Problems Sister    • No Known Problems Brother    • No Known Problems Son    • No Known  Problems Daughter    • No Known Problems Maternal Grandmother    • No Known Problems Maternal Grandfather    • No Known Problems Paternal Grandmother    • No Known Problems Paternal Grandfather    • No Known Problems Cousin    • Rheum arthritis Neg Hx    • Osteoarthritis Neg Hx    • Asthma Neg Hx    • Diabetes Neg Hx    • Heart failure Neg Hx    • Hyperlipidemia Neg Hx    • Hypertension Neg Hx    • Migraines Neg Hx    • Rashes / Skin problems Neg Hx    • Seizures Neg Hx    • Stroke Neg Hx    • Thyroid disease Neg Hx        Social History     Social History   • Marital status:      Spouse name: N/A   • Number of children: N/A   • Years of education: N/A     Social History Main Topics   • Smoking status: Never Smoker   • Smokeless tobacco: None   • Alcohol use No   • Drug use: No   • Sexual activity: Defer     Other Topics Concern   • None     Social History Narrative           Objective   Physical Exam    CONSTITUTIONAL: Awake, oriented, appears comfortable, non-toxic. +morbid obesity.   HENT: Atraumatic, normocephalic, oral mucosa pink and moist, airway patent. Nares patent without drainage. External ears normal.   EYES: Conjunctiva clear, EOMI, PERRL   NECK: Trachea midline, non-tender, supple   CARDIOVASCULAR: Normal heart rate, Normal rhythm, No murmurs, rubs, gallops   PULMONARY/CHEST: Clear to auscultation, no rhonchi, wheezes, or rales. Symmetrical breath sounds. Non-tender.   ABDOMINAL: Non-distended, soft, non-tender - no rebound or guarding. BS normal.  Palpation limited by body habitus.   NEUROLOGIC: Non-focal, moving all four extremities, no gross sensory or motor deficits.   EXTREMITIES: No clubbing, cyanosis, or edema.  Mild right calf tenderness.   SKIN: Warm, Dry, No erythema, No rash     US Renal Bilateral   Final Result   Authenticated by Teo Zambrano MD on 09/23/2017 09:30:54 AM      US Venous Doppler Lower Extremity Right (duplex)   Final Result   No evidence of right DVT.       Authenticated by Teo Zambrano MD on 09/23/2017 09:28:15 AM              EKG:         Procedures         ED Course  ED Course          ED COURSE / MEDICAL DECISION MAKING:   Nursing notes reviewed.    This is ER visit #36 in the past 12 months. During this time she has also had 12 CT scans of her abdomen and pelvis none of which have yielded any useful information or acute findings.    I do not feel patient's current presentation warrants any further ionizing radiation.  GERD has known history of hematuria which is being followed by her urologist.  Ultrasound negative for any significant hydronephrosis or evidence of obstructive ureteral stone.  Labs unremarkable.  DVT study of the right lower extremity was negative.      DECISION TO DISCHARGE/ADMIT: see ED care timeline       Electronically signed by: Salvador Maradiaga MD, 9/23/2017 10:31 AM              [facility]    Final diagnoses:   Chronic flank pain   Hematuria            Salvador Maradiaga MD  09/23/17 1032

## 2017-09-25 LAB
BACTERIA SPEC AEROBE CULT: ABNORMAL
BACTERIA SPEC AEROBE CULT: ABNORMAL

## 2017-10-08 ENCOUNTER — HOSPITAL ENCOUNTER (EMERGENCY)
Facility: HOSPITAL | Age: 32
Discharge: HOME | End: 2017-10-08
Attending: INTERNAL MEDICINE | Admitting: INTERNAL MEDICINE

## 2017-10-08 VITALS
HEIGHT: 64 IN | SYSTOLIC BLOOD PRESSURE: 128 MMHG | DIASTOLIC BLOOD PRESSURE: 82 MMHG | WEIGHT: 293 LBS | TEMPERATURE: 98 F | RESPIRATION RATE: 18 BRPM | OXYGEN SATURATION: 96 % | BODY MASS INDEX: 50.02 KG/M2 | HEART RATE: 78 BPM

## 2017-10-08 DIAGNOSIS — B37.31 CANDIDA VAGINITIS: Primary | ICD-10-CM

## 2017-10-08 LAB
BACTERIA UR QL AUTO: ABNORMAL /HPF
BILIRUB UR QL STRIP: NEGATIVE
CLARITY UR: CLEAR
COLOR UR: YELLOW
GLUCOSE UR STRIP-MCNC: NEGATIVE MG/DL
HGB UR QL STRIP.AUTO: ABNORMAL
HYALINE CASTS UR QL AUTO: ABNORMAL /LPF
KETONES UR QL STRIP: NEGATIVE
LEUKOCYTE ESTERASE UR QL STRIP.AUTO: NEGATIVE
NITRITE UR QL STRIP: NEGATIVE
PH UR STRIP.AUTO: 5.5 [PH] (ref 5–8)
PROT UR QL STRIP: NEGATIVE
RBC # UR: ABNORMAL /HPF
REF LAB TEST METHOD: ABNORMAL
SP GR UR STRIP: 1.02 (ref 1–1.03)
SQUAMOUS #/AREA URNS HPF: ABNORMAL /HPF
UROBILINOGEN UR QL STRIP: ABNORMAL
WBC UR QL AUTO: ABNORMAL /HPF

## 2017-10-08 PROCEDURE — 99284 EMERGENCY DEPT VISIT MOD MDM: CPT

## 2017-10-08 PROCEDURE — 81001 URINALYSIS AUTO W/SCOPE: CPT | Performed by: INTERNAL MEDICINE

## 2017-10-08 RX ORDER — FLUCONAZOLE 100 MG/1
200 TABLET ORAL ONCE
Status: COMPLETED | OUTPATIENT
Start: 2017-10-08 | End: 2017-10-08

## 2017-10-08 RX ADMIN — FLUCONAZOLE 200 MG: 100 TABLET ORAL at 01:12

## 2017-10-08 NOTE — ED PROVIDER NOTES
Subjective   Patient is a 32 y.o. female presenting with abdominal pain and dysuria.   Abdominal Pain   Associated symptoms: dysuria    Female Dysuria   Pain quality:  Burning  Pain severity:  Moderate  Onset quality:  Gradual  Duration:  3 days  Timing:  Intermittent  Progression:  Worsening  Chronicity:  Recurrent  Recent urinary tract infections: yes    Relieved by:  Nothing  Worsened by:  Nothing  Ineffective treatments:  None tried  Urinary symptoms: discolored urine, frequent urination, hematuria and hesitancy    Urinary symptoms: no bladder incontinence    Associated symptoms: abdominal pain    Risk factors: recurrent urinary tract infections    Risk factors: no renal disease        Review of Systems   Constitutional: Negative.    HENT: Negative.    Eyes: Negative.    Gastrointestinal: Positive for abdominal pain.   Endocrine: Negative.    Genitourinary: Positive for dysuria.   Musculoskeletal: Negative.    Neurological: Negative.    Psychiatric/Behavioral: Negative.    All other systems reviewed and are negative.      Past Medical History:   Diagnosis Date   • Depression    • Diabetes mellitus    • DVT (deep venous thrombosis)    • GERD (gastroesophageal reflux disease)    • Gout    • Hyperlipidemia    • Hypertension    • Hypothyroid    • Kidney stone    • Migraine    • Neuropathy    • PE (pulmonary embolism)        Allergies   Allergen Reactions   • Amoxicillin Hives   • Penicillins Hives   • Toradol [Ketorolac Tromethamine] Hives       Past Surgical History:   Procedure Laterality Date   • CARDIAC SURGERY      Heart Cath done in    •  SECTION     • CHOLECYSTECTOMY     • COLONOSCOPY         Family History   Problem Relation Age of Onset   • No Known Problems Mother    • No Known Problems Father    • No Known Problems Sister    • No Known Problems Brother    • No Known Problems Son    • No Known Problems Daughter    • No Known Problems Maternal Grandmother    • No Known Problems Maternal  Grandfather    • No Known Problems Paternal Grandmother    • No Known Problems Paternal Grandfather    • No Known Problems Cousin    • Rheum arthritis Neg Hx    • Osteoarthritis Neg Hx    • Asthma Neg Hx    • Diabetes Neg Hx    • Heart failure Neg Hx    • Hyperlipidemia Neg Hx    • Hypertension Neg Hx    • Migraines Neg Hx    • Rashes / Skin problems Neg Hx    • Seizures Neg Hx    • Stroke Neg Hx    • Thyroid disease Neg Hx        Social History     Social History   • Marital status:      Spouse name: N/A   • Number of children: N/A   • Years of education: N/A     Social History Main Topics   • Smoking status: Never Smoker   • Smokeless tobacco: None   • Alcohol use No   • Drug use: No   • Sexual activity: Defer     Other Topics Concern   • None     Social History Narrative           Objective   Physical Exam   Constitutional: She appears well-developed.   HENT:   Head: Normocephalic.   Eyes: Pupils are equal, round, and reactive to light.   Neck: Normal range of motion.   Cardiovascular: Normal rate and regular rhythm.    Abdominal: Soft. Bowel sounds are normal.   Large pannis   Genitourinary: There is no rash or lesion on the right labia. There is no rash or lesion on the left labia.   Genitourinary Comments: Curd like discharge noted.   Neurological: She is alert.   Nursing note and vitals reviewed.      Procedures         ED Course  ED Course                  MDM    Final diagnoses:   Candida vaginitis            Cristóbal Francisco MD  10/08/17 0115

## 2017-11-12 ENCOUNTER — APPOINTMENT (OUTPATIENT)
Dept: CT IMAGING | Facility: HOSPITAL | Age: 32
End: 2017-11-12

## 2017-11-12 ENCOUNTER — HOSPITAL ENCOUNTER (EMERGENCY)
Facility: HOSPITAL | Age: 32
Discharge: HOME | End: 2017-11-12
Attending: FAMILY MEDICINE | Admitting: FAMILY MEDICINE

## 2017-11-12 VITALS
SYSTOLIC BLOOD PRESSURE: 174 MMHG | TEMPERATURE: 98.2 F | BODY MASS INDEX: 50.02 KG/M2 | OXYGEN SATURATION: 98 % | DIASTOLIC BLOOD PRESSURE: 85 MMHG | HEIGHT: 64 IN | WEIGHT: 293 LBS | RESPIRATION RATE: 16 BRPM | HEART RATE: 76 BPM

## 2017-11-12 DIAGNOSIS — R31.0 FRANK HEMATURIA: ICD-10-CM

## 2017-11-12 DIAGNOSIS — R10.31 RIGHT LOWER QUADRANT ABDOMINAL PAIN: Primary | ICD-10-CM

## 2017-11-12 LAB
ALBUMIN SERPL-MCNC: 4 G/DL (ref 3.5–5)
ALBUMIN/GLOB SERPL: 1.3 G/DL (ref 1.5–2.5)
ALP SERPL-CCNC: 69 U/L (ref 35–104)
ALT SERPL W P-5'-P-CCNC: 35 U/L (ref 10–36)
ANION GAP SERPL CALCULATED.3IONS-SCNC: 7.2 MMOL/L (ref 3.6–11.2)
APTT PPP: 56.6 SECONDS (ref 23.8–36.1)
AST SERPL-CCNC: 41 U/L (ref 10–30)
BACTERIA UR QL AUTO: ABNORMAL /HPF
BASOPHILS # BLD AUTO: 0.02 10*3/MM3 (ref 0–0.3)
BASOPHILS NFR BLD AUTO: 0.4 % (ref 0–2)
BILIRUB SERPL-MCNC: 0.3 MG/DL (ref 0.2–1.8)
BILIRUB UR QL STRIP: NEGATIVE
BUN BLD-MCNC: 13 MG/DL (ref 7–21)
BUN/CREAT SERPL: 24.5 (ref 7–25)
CALCIUM SPEC-SCNC: 9 MG/DL (ref 7.7–10)
CHLORIDE SERPL-SCNC: 106 MMOL/L (ref 99–112)
CLARITY UR: CLEAR
CO2 SERPL-SCNC: 22.8 MMOL/L (ref 24.3–31.9)
COLOR UR: YELLOW
CREAT BLD-MCNC: 0.53 MG/DL (ref 0.43–1.29)
CRP SERPL-MCNC: 3.65 MG/DL (ref 0–0.99)
D-LACTATE SERPL-SCNC: 2.3 MMOL/L (ref 0.5–2)
DEPRECATED RDW RBC AUTO: 47.1 FL (ref 37–54)
EOSINOPHIL # BLD AUTO: 0.09 10*3/MM3 (ref 0–0.7)
EOSINOPHIL NFR BLD AUTO: 1.7 % (ref 0–5)
ERYTHROCYTE [DISTWIDTH] IN BLOOD BY AUTOMATED COUNT: 17.2 % (ref 11.5–14.5)
GFR SERPL CREATININE-BSD FRML MDRD: 135 ML/MIN/1.73
GLOBULIN UR ELPH-MCNC: 3 GM/DL
GLUCOSE BLD-MCNC: 206 MG/DL (ref 70–110)
GLUCOSE UR STRIP-MCNC: NEGATIVE MG/DL
HCG SERPL QL: NEGATIVE
HCT VFR BLD AUTO: 29.1 % (ref 37–47)
HGB BLD-MCNC: 8.8 G/DL (ref 12–16)
HGB UR QL STRIP.AUTO: ABNORMAL
HOLD SPECIMEN: NORMAL
HYALINE CASTS UR QL AUTO: ABNORMAL /LPF
IMM GRANULOCYTES # BLD: 0.01 10*3/MM3 (ref 0–0.03)
IMM GRANULOCYTES NFR BLD: 0.2 % (ref 0–0.5)
INR PPP: 2.71 (ref 0.9–1.1)
KETONES UR QL STRIP: NEGATIVE
LEUKOCYTE ESTERASE UR QL STRIP.AUTO: NEGATIVE
LIPASE SERPL-CCNC: 50 U/L (ref 13–60)
LYMPHOCYTES # BLD AUTO: 1.48 10*3/MM3 (ref 1–3)
LYMPHOCYTES NFR BLD AUTO: 27.7 % (ref 21–51)
MCH RBC QN AUTO: 24.1 PG (ref 27–33)
MCHC RBC AUTO-ENTMCNC: 30.2 G/DL (ref 33–37)
MCV RBC AUTO: 79.7 FL (ref 80–94)
MONOCYTES # BLD AUTO: 0.53 10*3/MM3 (ref 0.1–0.9)
MONOCYTES NFR BLD AUTO: 9.9 % (ref 0–10)
NEUTROPHILS # BLD AUTO: 3.22 10*3/MM3 (ref 1.4–6.5)
NEUTROPHILS NFR BLD AUTO: 60.1 % (ref 30–70)
NITRITE UR QL STRIP: NEGATIVE
OSMOLALITY SERPL CALC.SUM OF ELEC: 278 MOSM/KG (ref 273–305)
PH UR STRIP.AUTO: <=5 [PH] (ref 5–8)
PLATELET # BLD AUTO: 260 10*3/MM3 (ref 130–400)
PMV BLD AUTO: 9.5 FL (ref 6–10)
POTASSIUM BLD-SCNC: 4 MMOL/L (ref 3.5–5.3)
PROT SERPL-MCNC: 7 G/DL (ref 6–8)
PROT UR QL STRIP: NEGATIVE
PROTHROMBIN TIME: 29.1 SECONDS (ref 11–15.4)
RBC # BLD AUTO: 3.65 10*6/MM3 (ref 4.2–5.4)
RBC # UR: ABNORMAL /HPF
REF LAB TEST METHOD: ABNORMAL
SODIUM BLD-SCNC: 136 MMOL/L (ref 135–153)
SP GR UR STRIP: 1.02 (ref 1–1.03)
SQUAMOUS #/AREA URNS HPF: ABNORMAL /HPF
UROBILINOGEN UR QL STRIP: ABNORMAL
WBC NRBC COR # BLD: 5.35 10*3/MM3 (ref 4.5–12.5)
WBC UR QL AUTO: ABNORMAL /HPF

## 2017-11-12 PROCEDURE — 80053 COMPREHEN METABOLIC PANEL: CPT | Performed by: FAMILY MEDICINE

## 2017-11-12 PROCEDURE — 86140 C-REACTIVE PROTEIN: CPT | Performed by: FAMILY MEDICINE

## 2017-11-12 PROCEDURE — 25010000002 HYDROMORPHONE PER 4 MG

## 2017-11-12 PROCEDURE — 85610 PROTHROMBIN TIME: CPT | Performed by: FAMILY MEDICINE

## 2017-11-12 PROCEDURE — 99284 EMERGENCY DEPT VISIT MOD MDM: CPT

## 2017-11-12 PROCEDURE — 83605 ASSAY OF LACTIC ACID: CPT | Performed by: FAMILY MEDICINE

## 2017-11-12 PROCEDURE — 96374 THER/PROPH/DIAG INJ IV PUSH: CPT

## 2017-11-12 PROCEDURE — 87040 BLOOD CULTURE FOR BACTERIA: CPT | Performed by: FAMILY MEDICINE

## 2017-11-12 PROCEDURE — 74176 CT ABD & PELVIS W/O CONTRAST: CPT | Performed by: RADIOLOGY

## 2017-11-12 PROCEDURE — 96361 HYDRATE IV INFUSION ADD-ON: CPT

## 2017-11-12 PROCEDURE — 36415 COLL VENOUS BLD VENIPUNCTURE: CPT

## 2017-11-12 PROCEDURE — 85730 THROMBOPLASTIN TIME PARTIAL: CPT | Performed by: FAMILY MEDICINE

## 2017-11-12 PROCEDURE — 83690 ASSAY OF LIPASE: CPT | Performed by: FAMILY MEDICINE

## 2017-11-12 PROCEDURE — 81001 URINALYSIS AUTO W/SCOPE: CPT | Performed by: FAMILY MEDICINE

## 2017-11-12 PROCEDURE — 25010000002 ONDANSETRON PER 1 MG: Performed by: FAMILY MEDICINE

## 2017-11-12 PROCEDURE — 85025 COMPLETE CBC W/AUTO DIFF WBC: CPT | Performed by: FAMILY MEDICINE

## 2017-11-12 PROCEDURE — 84703 CHORIONIC GONADOTROPIN ASSAY: CPT | Performed by: FAMILY MEDICINE

## 2017-11-12 PROCEDURE — 74176 CT ABD & PELVIS W/O CONTRAST: CPT

## 2017-11-12 RX ORDER — OXYCODONE AND ACETAMINOPHEN 10; 325 MG/1; MG/1
1 TABLET ORAL ONCE
Status: COMPLETED | OUTPATIENT
Start: 2017-11-12 | End: 2017-11-12

## 2017-11-12 RX ORDER — SODIUM CHLORIDE 0.9 % (FLUSH) 0.9 %
10 SYRINGE (ML) INJECTION AS NEEDED
Status: DISCONTINUED | OUTPATIENT
Start: 2017-11-12 | End: 2017-11-12 | Stop reason: HOSPADM

## 2017-11-12 RX ORDER — HYDROMORPHONE HCL 110MG/55ML
PATIENT CONTROLLED ANALGESIA SYRINGE INTRAVENOUS
Status: COMPLETED
Start: 2017-11-12 | End: 2017-11-12

## 2017-11-12 RX ORDER — ACETAMINOPHEN AND CODEINE PHOSPHATE 300; 30 MG/1; MG/1
1 TABLET ORAL EVERY 4 HOURS PRN
Qty: 16 TABLET | Refills: 0 | Status: SHIPPED | OUTPATIENT
Start: 2017-11-12 | End: 2017-12-01

## 2017-11-12 RX ORDER — ONDANSETRON 4 MG/1
4 TABLET, ORALLY DISINTEGRATING ORAL ONCE
Status: COMPLETED | OUTPATIENT
Start: 2017-11-12 | End: 2017-11-12

## 2017-11-12 RX ORDER — ONDANSETRON 4 MG/1
4 TABLET, ORALLY DISINTEGRATING ORAL EVERY 6 HOURS PRN
Qty: 12 TABLET | Refills: 0 | Status: SHIPPED | OUTPATIENT
Start: 2017-11-12 | End: 2017-12-01

## 2017-11-12 RX ORDER — ONDANSETRON 2 MG/ML
4 INJECTION INTRAMUSCULAR; INTRAVENOUS ONCE
Status: COMPLETED | OUTPATIENT
Start: 2017-11-12 | End: 2017-11-12

## 2017-11-12 RX ADMIN — SODIUM CHLORIDE 1000 ML: 9 INJECTION, SOLUTION INTRAVENOUS at 15:01

## 2017-11-12 RX ADMIN — HYDROMORPHONE HYDROCHLORIDE 1 MG: 2 INJECTION INTRAMUSCULAR; INTRAVENOUS; SUBCUTANEOUS at 16:16

## 2017-11-12 RX ADMIN — HYDROMORPHONE HYDROCHLORIDE 1 MG: 2 INJECTION INTRAMUSCULAR; INTRAVENOUS; SUBCUTANEOUS at 17:35

## 2017-11-12 RX ADMIN — SODIUM CHLORIDE 1000 ML: 9 INJECTION, SOLUTION INTRAVENOUS at 17:35

## 2017-11-12 RX ADMIN — ONDANSETRON 4 MG: 4 TABLET, ORALLY DISINTEGRATING ORAL at 15:01

## 2017-11-12 RX ADMIN — OXYCODONE HYDROCHLORIDE AND ACETAMINOPHEN 1 TABLET: 10; 325 TABLET ORAL at 15:01

## 2017-11-12 RX ADMIN — ONDANSETRON 4 MG: 2 INJECTION, SOLUTION INTRAMUSCULAR; INTRAVENOUS at 17:34

## 2017-11-12 NOTE — ED PROVIDER NOTES
Subjective   History of Present Illness  31 y/o F w/h/o kidney stones p/w 24 hours of worsening R sided abdominal pain that radiates to her RLQ. +subjective fevers/chills. +nausea. Pt states that she does have some dysuria/frequency/urgency. +nausea. +NB loose BM. No emesis. Pt has h/o DVT and is on chronic anticoagulation.   Review of Systems   Constitutional: Positive for chills, fatigue and fever.   Eyes: Negative for photophobia and visual disturbance.   Respiratory: Negative for cough, chest tightness, shortness of breath and wheezing.    Cardiovascular: Negative for chest pain and palpitations.   Gastrointestinal: Positive for abdominal pain, diarrhea and nausea. Negative for abdominal distention, constipation and vomiting.   Genitourinary: Positive for difficulty urinating, dysuria, frequency, hematuria and urgency. Negative for flank pain.   Musculoskeletal: Negative for back pain, neck pain and neck stiffness.   Skin: Negative for color change and pallor.   All other systems reviewed and are negative.      Past Medical History:   Diagnosis Date   • Depression    • Diabetes mellitus    • DVT (deep venous thrombosis)    • GERD (gastroesophageal reflux disease)    • Gout    • Hyperlipidemia    • Hypertension    • Hypothyroid    • Kidney stone    • Migraine    • Neuropathy    • PE (pulmonary embolism)        Allergies   Allergen Reactions   • Amoxicillin Hives   • Penicillins Hives   • Toradol [Ketorolac Tromethamine] Hives       Past Surgical History:   Procedure Laterality Date   • CARDIAC SURGERY      Heart Cath done in    •  SECTION     • CHOLECYSTECTOMY     • COLONOSCOPY         Family History   Problem Relation Age of Onset   • No Known Problems Mother    • No Known Problems Father    • No Known Problems Sister    • No Known Problems Brother    • No Known Problems Son    • No Known Problems Daughter    • No Known Problems Maternal Grandmother    • No Known Problems Maternal Grandfather    • No  Known Problems Paternal Grandmother    • No Known Problems Paternal Grandfather    • No Known Problems Cousin    • Rheum arthritis Neg Hx    • Osteoarthritis Neg Hx    • Asthma Neg Hx    • Diabetes Neg Hx    • Heart failure Neg Hx    • Hyperlipidemia Neg Hx    • Hypertension Neg Hx    • Migraines Neg Hx    • Rashes / Skin problems Neg Hx    • Seizures Neg Hx    • Stroke Neg Hx    • Thyroid disease Neg Hx        Social History     Social History   • Marital status:      Spouse name: N/A   • Number of children: N/A   • Years of education: N/A     Social History Main Topics   • Smoking status: Never Smoker   • Smokeless tobacco: None   • Alcohol use No   • Drug use: No   • Sexual activity: Defer     Other Topics Concern   • None     Social History Narrative           Objective   Physical Exam   Constitutional: She is oriented to person, place, and time. She appears well-developed and well-nourished. She is active.   HENT:   Head: Normocephalic and atraumatic.   Right Ear: Hearing and external ear normal.   Left Ear: Hearing and external ear normal.   Nose: Nose normal.   Mouth/Throat: Uvula is midline, oropharynx is clear and moist and mucous membranes are normal.   Eyes: Conjunctivae, EOM and lids are normal. Pupils are equal, round, and reactive to light.   Neck: Trachea normal, normal range of motion, full passive range of motion without pain and phonation normal. Neck supple.   Cardiovascular: Normal rate, regular rhythm and normal heart sounds.    Pulmonary/Chest: Effort normal and breath sounds normal.   Abdominal: Soft. Normal appearance. She exhibits no distension. There is tenderness in the right lower quadrant, periumbilical area and suprapubic area. There is no rigidity, no rebound and no guarding.   Neurological: She is alert and oriented to person, place, and time. GCS eye subscore is 4. GCS verbal subscore is 5. GCS motor subscore is 6.   Skin: Skin is warm, dry and intact.   Psychiatric: She has  a normal mood and affect. Her speech is normal and behavior is normal. Cognition and memory are normal.   Nursing note and vitals reviewed.      Procedures         ED Course  ED Course      Pt's pain controlled with opiates. Pt's CT scan was negative for stone but pt does have hematuria on UA. Pt on chronic anticoagulation for DVT. Pt given prescription to help control pain as outpt.            MDM  Number of Diagnoses or Management Options  Gerardo hematuria: new and requires workup  Right lower quadrant abdominal pain: new and requires workup     Amount and/or Complexity of Data Reviewed  Clinical lab tests: reviewed and ordered  Tests in the radiology section of CPT®: reviewed and ordered  Decide to obtain previous medical records or to obtain history from someone other than the patient: yes  Independent visualization of images, tracings, or specimens: yes    Risk of Complications, Morbidity, and/or Mortality  Presenting problems: high  Diagnostic procedures: high  Management options: high    Patient Progress  Patient progress: stable      Final diagnoses:   Right lower quadrant abdominal pain   Gerardo hematuria            Dayron Collier MD  11/12/17 1138

## 2017-11-17 LAB
BACTERIA SPEC AEROBE CULT: NORMAL
BACTERIA SPEC AEROBE CULT: NORMAL

## 2017-11-24 ENCOUNTER — HOSPITAL ENCOUNTER (EMERGENCY)
Facility: HOSPITAL | Age: 32
Discharge: HOME | End: 2017-11-24
Admitting: EMERGENCY MEDICINE

## 2017-11-24 VITALS
TEMPERATURE: 99.1 F | WEIGHT: 293 LBS | RESPIRATION RATE: 17 BRPM | DIASTOLIC BLOOD PRESSURE: 89 MMHG | SYSTOLIC BLOOD PRESSURE: 135 MMHG | BODY MASS INDEX: 50.02 KG/M2 | OXYGEN SATURATION: 99 % | HEIGHT: 64 IN | HEART RATE: 73 BPM

## 2017-11-24 DIAGNOSIS — R10.9 FLANK PAIN: Primary | ICD-10-CM

## 2017-11-24 LAB
ALBUMIN SERPL-MCNC: 4.6 G/DL (ref 3.5–5)
ALBUMIN/GLOB SERPL: 1.4 G/DL (ref 1.5–2.5)
ALP SERPL-CCNC: 85 U/L (ref 35–104)
ALT SERPL W P-5'-P-CCNC: 67 U/L (ref 10–36)
ANION GAP SERPL CALCULATED.3IONS-SCNC: 11.3 MMOL/L (ref 3.6–11.2)
AST SERPL-CCNC: 99 U/L (ref 10–30)
BACTERIA UR QL AUTO: ABNORMAL /HPF
BASOPHILS # BLD AUTO: 0.02 10*3/MM3 (ref 0–0.3)
BASOPHILS NFR BLD AUTO: 0.4 % (ref 0–2)
BILIRUB SERPL-MCNC: 0.5 MG/DL (ref 0.2–1.8)
BILIRUB UR QL STRIP: NEGATIVE
BUN BLD-MCNC: 8 MG/DL (ref 7–21)
BUN/CREAT SERPL: 12.9 (ref 7–25)
CALCIUM SPEC-SCNC: 9.5 MG/DL (ref 7.7–10)
CHLORIDE SERPL-SCNC: 105 MMOL/L (ref 99–112)
CLARITY UR: CLEAR
CO2 SERPL-SCNC: 23.7 MMOL/L (ref 24.3–31.9)
COLOR UR: ABNORMAL
CREAT BLD-MCNC: 0.62 MG/DL (ref 0.43–1.29)
DEPRECATED RDW RBC AUTO: 49.7 FL (ref 37–54)
EOSINOPHIL # BLD AUTO: 0.13 10*3/MM3 (ref 0–0.7)
EOSINOPHIL NFR BLD AUTO: 2.5 % (ref 0–5)
ERYTHROCYTE [DISTWIDTH] IN BLOOD BY AUTOMATED COUNT: 17.9 % (ref 11.5–14.5)
GFR SERPL CREATININE-BSD FRML MDRD: 112 ML/MIN/1.73
GLOBULIN UR ELPH-MCNC: 3.3 GM/DL
GLUCOSE BLD-MCNC: 168 MG/DL (ref 70–110)
GLUCOSE UR STRIP-MCNC: NEGATIVE MG/DL
HCT VFR BLD AUTO: 31.2 % (ref 37–47)
HGB BLD-MCNC: 9.4 G/DL (ref 12–16)
HGB UR QL STRIP.AUTO: ABNORMAL
HYALINE CASTS UR QL AUTO: ABNORMAL /LPF
IMM GRANULOCYTES # BLD: 0.01 10*3/MM3 (ref 0–0.03)
IMM GRANULOCYTES NFR BLD: 0.2 % (ref 0–0.5)
KETONES UR QL STRIP: NEGATIVE
LEUKOCYTE ESTERASE UR QL STRIP.AUTO: NEGATIVE
LYMPHOCYTES # BLD AUTO: 1.72 10*3/MM3 (ref 1–3)
LYMPHOCYTES NFR BLD AUTO: 33.5 % (ref 21–51)
MCH RBC QN AUTO: 24.2 PG (ref 27–33)
MCHC RBC AUTO-ENTMCNC: 30.1 G/DL (ref 33–37)
MCV RBC AUTO: 80.2 FL (ref 80–94)
MONOCYTES # BLD AUTO: 0.38 10*3/MM3 (ref 0.1–0.9)
MONOCYTES NFR BLD AUTO: 7.4 % (ref 0–10)
NEUTROPHILS # BLD AUTO: 2.87 10*3/MM3 (ref 1.4–6.5)
NEUTROPHILS NFR BLD AUTO: 56 % (ref 30–70)
NITRITE UR QL STRIP: NEGATIVE
OSMOLALITY SERPL CALC.SUM OF ELEC: 281.6 MOSM/KG (ref 273–305)
PH UR STRIP.AUTO: <=5 [PH] (ref 5–8)
PLATELET # BLD AUTO: 305 10*3/MM3 (ref 130–400)
PMV BLD AUTO: 9.2 FL (ref 6–10)
POTASSIUM BLD-SCNC: 4.1 MMOL/L (ref 3.5–5.3)
PROT SERPL-MCNC: 7.9 G/DL (ref 6–8)
PROT UR QL STRIP: NEGATIVE
RBC # BLD AUTO: 3.89 10*6/MM3 (ref 4.2–5.4)
RBC # UR: ABNORMAL /HPF
REF LAB TEST METHOD: ABNORMAL
SODIUM BLD-SCNC: 140 MMOL/L (ref 135–153)
SP GR UR STRIP: 1.02 (ref 1–1.03)
SQUAMOUS #/AREA URNS HPF: ABNORMAL /HPF
UROBILINOGEN UR QL STRIP: ABNORMAL
WBC NRBC COR # BLD: 5.13 10*3/MM3 (ref 4.5–12.5)
WBC UR QL AUTO: ABNORMAL /HPF

## 2017-11-24 PROCEDURE — 80053 COMPREHEN METABOLIC PANEL: CPT | Performed by: NURSE PRACTITIONER

## 2017-11-24 PROCEDURE — 25010000002 ONDANSETRON PER 1 MG: Performed by: NURSE PRACTITIONER

## 2017-11-24 PROCEDURE — 96375 TX/PRO/DX INJ NEW DRUG ADDON: CPT

## 2017-11-24 PROCEDURE — 96374 THER/PROPH/DIAG INJ IV PUSH: CPT

## 2017-11-24 PROCEDURE — 99283 EMERGENCY DEPT VISIT LOW MDM: CPT

## 2017-11-24 PROCEDURE — 96376 TX/PRO/DX INJ SAME DRUG ADON: CPT

## 2017-11-24 PROCEDURE — 25010000002 HYDROMORPHONE PER 4 MG: Performed by: NURSE PRACTITIONER

## 2017-11-24 PROCEDURE — 85025 COMPLETE CBC W/AUTO DIFF WBC: CPT | Performed by: NURSE PRACTITIONER

## 2017-11-24 PROCEDURE — 81001 URINALYSIS AUTO W/SCOPE: CPT | Performed by: NURSE PRACTITIONER

## 2017-11-24 RX ORDER — HYDROMORPHONE HYDROCHLORIDE 1 MG/ML
0.5 INJECTION, SOLUTION INTRAMUSCULAR; INTRAVENOUS; SUBCUTANEOUS ONCE
Status: COMPLETED | OUTPATIENT
Start: 2017-11-24 | End: 2017-11-24

## 2017-11-24 RX ORDER — OXYCODONE HYDROCHLORIDE AND ACETAMINOPHEN 5; 325 MG/1; MG/1
1 TABLET ORAL EVERY 4 HOURS PRN
Qty: 8 TABLET | Refills: 0 | Status: SHIPPED | OUTPATIENT
Start: 2017-11-24 | End: 2017-12-01

## 2017-11-24 RX ORDER — ONDANSETRON 2 MG/ML
4 INJECTION INTRAMUSCULAR; INTRAVENOUS ONCE
Status: COMPLETED | OUTPATIENT
Start: 2017-11-24 | End: 2017-11-24

## 2017-11-24 RX ORDER — SODIUM CHLORIDE 0.9 % (FLUSH) 0.9 %
10 SYRINGE (ML) INJECTION AS NEEDED
Status: DISCONTINUED | OUTPATIENT
Start: 2017-11-24 | End: 2017-11-24 | Stop reason: HOSPADM

## 2017-11-24 RX ORDER — TAMSULOSIN HYDROCHLORIDE 0.4 MG/1
1 CAPSULE ORAL NIGHTLY
Qty: 10 CAPSULE | Refills: 0 | Status: SHIPPED | OUTPATIENT
Start: 2017-11-24 | End: 2017-12-01

## 2017-11-24 RX ADMIN — ONDANSETRON 4 MG: 2 INJECTION, SOLUTION INTRAMUSCULAR; INTRAVENOUS at 14:47

## 2017-11-24 RX ADMIN — HYDROMORPHONE HYDROCHLORIDE 0.5 MG: 1 INJECTION, SOLUTION INTRAMUSCULAR; INTRAVENOUS; SUBCUTANEOUS at 16:02

## 2017-11-24 RX ADMIN — HYDROMORPHONE HYDROCHLORIDE 0.5 MG: 1 INJECTION, SOLUTION INTRAMUSCULAR; INTRAVENOUS; SUBCUTANEOUS at 14:46

## 2017-11-24 NOTE — ED PROVIDER NOTES
Subjective   Patient is a 32 y.o. female presenting with flank pain.   History provided by:  Patient   used: No    Flank Pain   Pain location:  R flank  Pain quality: sharp    Pain radiates to:  R flank, RUQ and RLQ  Pain severity:  Moderate  Onset quality:  Sudden  Duration:  1 day  Timing:  Constant  Progression:  Worsening  Chronicity:  New  Context: not alcohol use, not diet changes, not medication withdrawal, not previous surgeries, not retching, not sick contacts and not suspicious food intake    Relieved by:  Nothing  Worsened by:  Nothing  Ineffective treatments:  None tried  Associated symptoms: no anorexia, no belching, no chest pain, no chills, no dysuria, no fatigue, no fever, no flatus, no hematemesis, no melena, no shortness of breath, no sore throat and no vaginal bleeding    Risk factors: no alcohol abuse, no aspirin use, not elderly, has not had multiple surgeries, no NSAID use, not obese, not pregnant and no recent hospitalization        Review of Systems   Constitutional: Negative.  Negative for chills, fatigue and fever.   HENT: Negative.  Negative for sore throat.    Eyes: Negative.    Respiratory: Negative.  Negative for shortness of breath.    Cardiovascular: Negative.  Negative for chest pain.   Gastrointestinal: Negative.  Negative for anorexia, flatus, hematemesis and melena.   Endocrine: Negative.    Genitourinary: Positive for flank pain. Negative for dysuria and vaginal bleeding.   Skin: Negative.    Allergic/Immunologic: Negative.    Neurological: Negative.    Hematological: Negative.    Psychiatric/Behavioral: Negative.        Past Medical History:   Diagnosis Date   • Depression    • Diabetes mellitus    • DVT (deep venous thrombosis)    • GERD (gastroesophageal reflux disease)    • Gout    • Hyperlipidemia    • Hypertension    • Hypothyroid    • Kidney stone    • Migraine    • Neuropathy    • PE (pulmonary embolism)        Allergies   Allergen Reactions   •  Amoxicillin Hives   • Penicillins Hives   • Toradol [Ketorolac Tromethamine] Hives       Past Surgical History:   Procedure Laterality Date   • CARDIAC SURGERY      Heart Cath done in    •  SECTION     • CHOLECYSTECTOMY     • COLONOSCOPY         Family History   Problem Relation Age of Onset   • No Known Problems Mother    • No Known Problems Father    • No Known Problems Sister    • No Known Problems Brother    • No Known Problems Son    • No Known Problems Daughter    • No Known Problems Maternal Grandmother    • No Known Problems Maternal Grandfather    • No Known Problems Paternal Grandmother    • No Known Problems Paternal Grandfather    • No Known Problems Cousin    • Rheum arthritis Neg Hx    • Osteoarthritis Neg Hx    • Asthma Neg Hx    • Diabetes Neg Hx    • Heart failure Neg Hx    • Hyperlipidemia Neg Hx    • Hypertension Neg Hx    • Migraines Neg Hx    • Rashes / Skin problems Neg Hx    • Seizures Neg Hx    • Stroke Neg Hx    • Thyroid disease Neg Hx        Social History     Social History   • Marital status:      Spouse name: N/A   • Number of children: N/A   • Years of education: N/A     Social History Main Topics   • Smoking status: Never Smoker   • Smokeless tobacco: None   • Alcohol use No   • Drug use: No   • Sexual activity: Defer     Other Topics Concern   • None     Social History Narrative   • None           Objective   Physical Exam   Constitutional: She is oriented to person, place, and time. She appears well-developed and well-nourished.   HENT:   Head: Normocephalic.   Right Ear: External ear normal.   Left Ear: External ear normal.   Mouth/Throat: Oropharynx is clear and moist.   Eyes: EOM are normal. Pupils are equal, round, and reactive to light.   Neck: Normal range of motion. Neck supple.   Cardiovascular: Normal rate.    Pulmonary/Chest: Effort normal and breath sounds normal.   Abdominal: Soft. Bowel sounds are normal.   Musculoskeletal: Normal range of motion.    Neurological: She is alert and oriented to person, place, and time.   Skin: Skin is warm and dry.   Psychiatric: She has a normal mood and affect. Her behavior is normal.   Nursing note and vitals reviewed.      Procedures         ED Course  ED Course                  MDM    Final diagnoses:   Flank pain            Charly Fisher, YEIMI  11/24/17 1600       Charly Fisher, YEIMI  11/24/17 1600

## 2017-11-27 ENCOUNTER — HOSPITAL ENCOUNTER (EMERGENCY)
Facility: HOSPITAL | Age: 32
Discharge: HOME | End: 2017-11-27
Attending: EMERGENCY MEDICINE | Admitting: EMERGENCY MEDICINE

## 2017-11-27 VITALS
HEIGHT: 64 IN | TEMPERATURE: 98.4 F | DIASTOLIC BLOOD PRESSURE: 75 MMHG | RESPIRATION RATE: 18 BRPM | SYSTOLIC BLOOD PRESSURE: 135 MMHG | WEIGHT: 293 LBS | OXYGEN SATURATION: 96 % | HEART RATE: 79 BPM | BODY MASS INDEX: 50.02 KG/M2

## 2017-11-27 DIAGNOSIS — G89.29 CHRONIC ABDOMINAL PAIN: Primary | ICD-10-CM

## 2017-11-27 DIAGNOSIS — R10.9 CHRONIC ABDOMINAL PAIN: Primary | ICD-10-CM

## 2017-11-27 LAB
ALBUMIN SERPL-MCNC: 4.3 G/DL (ref 3.5–5)
ALBUMIN/GLOB SERPL: 1.4 G/DL (ref 1.5–2.5)
ALP SERPL-CCNC: 77 U/L (ref 35–104)
ALT SERPL W P-5'-P-CCNC: 76 U/L (ref 10–36)
AMYLASE SERPL-CCNC: 41 U/L (ref 28–100)
ANION GAP SERPL CALCULATED.3IONS-SCNC: 11.1 MMOL/L (ref 3.6–11.2)
APTT PPP: 28.4 SECONDS (ref 23.8–36.1)
AST SERPL-CCNC: 100 U/L (ref 10–30)
BASOPHILS # BLD AUTO: 0.01 10*3/MM3 (ref 0–0.3)
BASOPHILS NFR BLD AUTO: 0.2 % (ref 0–2)
BILIRUB SERPL-MCNC: 0.3 MG/DL (ref 0.2–1.8)
BILIRUB UR QL STRIP: NEGATIVE
BUN BLD-MCNC: 8 MG/DL (ref 7–21)
BUN/CREAT SERPL: 12.7 (ref 7–25)
CALCIUM SPEC-SCNC: 8.9 MG/DL (ref 7.7–10)
CHLORIDE SERPL-SCNC: 106 MMOL/L (ref 99–112)
CLARITY UR: CLEAR
CO2 SERPL-SCNC: 21.9 MMOL/L (ref 24.3–31.9)
COLOR UR: YELLOW
CREAT BLD-MCNC: 0.63 MG/DL (ref 0.43–1.29)
DEPRECATED RDW RBC AUTO: 50.4 FL (ref 37–54)
EOSINOPHIL # BLD AUTO: 0.09 10*3/MM3 (ref 0–0.7)
EOSINOPHIL NFR BLD AUTO: 1.8 % (ref 0–5)
ERYTHROCYTE [DISTWIDTH] IN BLOOD BY AUTOMATED COUNT: 18 % (ref 11.5–14.5)
GFR SERPL CREATININE-BSD FRML MDRD: 110 ML/MIN/1.73
GLOBULIN UR ELPH-MCNC: 3.1 GM/DL
GLUCOSE BLD-MCNC: 227 MG/DL (ref 70–110)
GLUCOSE UR STRIP-MCNC: NEGATIVE MG/DL
HCT VFR BLD AUTO: 29.8 % (ref 37–47)
HGB BLD-MCNC: 8.9 G/DL (ref 12–16)
HGB UR QL STRIP.AUTO: NEGATIVE
IMM GRANULOCYTES # BLD: 0.01 10*3/MM3 (ref 0–0.03)
IMM GRANULOCYTES NFR BLD: 0.2 % (ref 0–0.5)
INR PPP: 1.11 (ref 0.9–1.1)
KETONES UR QL STRIP: NEGATIVE
LEUKOCYTE ESTERASE UR QL STRIP.AUTO: NEGATIVE
LIPASE SERPL-CCNC: 46 U/L (ref 13–60)
LYMPHOCYTES # BLD AUTO: 1.77 10*3/MM3 (ref 1–3)
LYMPHOCYTES NFR BLD AUTO: 36.3 % (ref 21–51)
MCH RBC QN AUTO: 24.3 PG (ref 27–33)
MCHC RBC AUTO-ENTMCNC: 29.9 G/DL (ref 33–37)
MCV RBC AUTO: 81.2 FL (ref 80–94)
MONOCYTES # BLD AUTO: 0.51 10*3/MM3 (ref 0.1–0.9)
MONOCYTES NFR BLD AUTO: 10.5 % (ref 0–10)
NEUTROPHILS # BLD AUTO: 2.48 10*3/MM3 (ref 1.4–6.5)
NEUTROPHILS NFR BLD AUTO: 51 % (ref 30–70)
NITRITE UR QL STRIP: NEGATIVE
OSMOLALITY SERPL CALC.SUM OF ELEC: 283 MOSM/KG (ref 273–305)
PH UR STRIP.AUTO: <=5 [PH] (ref 5–8)
PLATELET # BLD AUTO: 281 10*3/MM3 (ref 130–400)
PMV BLD AUTO: 9.6 FL (ref 6–10)
POTASSIUM BLD-SCNC: 3.8 MMOL/L (ref 3.5–5.3)
PROT SERPL-MCNC: 7.4 G/DL (ref 6–8)
PROT UR QL STRIP: NEGATIVE
PROTHROMBIN TIME: 14.4 SECONDS (ref 11–15.4)
RBC # BLD AUTO: 3.67 10*6/MM3 (ref 4.2–5.4)
SODIUM BLD-SCNC: 139 MMOL/L (ref 135–153)
SP GR UR STRIP: 1.02 (ref 1–1.03)
UROBILINOGEN UR QL STRIP: NORMAL
WBC NRBC COR # BLD: 4.87 10*3/MM3 (ref 4.5–12.5)

## 2017-11-27 PROCEDURE — 36415 COLL VENOUS BLD VENIPUNCTURE: CPT

## 2017-11-27 PROCEDURE — 81003 URINALYSIS AUTO W/O SCOPE: CPT | Performed by: EMERGENCY MEDICINE

## 2017-11-27 PROCEDURE — 99284 EMERGENCY DEPT VISIT MOD MDM: CPT

## 2017-11-27 PROCEDURE — P9612 CATHETERIZE FOR URINE SPEC: HCPCS

## 2017-11-27 PROCEDURE — 82150 ASSAY OF AMYLASE: CPT | Performed by: EMERGENCY MEDICINE

## 2017-11-27 PROCEDURE — 85610 PROTHROMBIN TIME: CPT | Performed by: EMERGENCY MEDICINE

## 2017-11-27 PROCEDURE — 85025 COMPLETE CBC W/AUTO DIFF WBC: CPT | Performed by: EMERGENCY MEDICINE

## 2017-11-27 PROCEDURE — 85730 THROMBOPLASTIN TIME PARTIAL: CPT | Performed by: EMERGENCY MEDICINE

## 2017-11-27 PROCEDURE — 80053 COMPREHEN METABOLIC PANEL: CPT | Performed by: EMERGENCY MEDICINE

## 2017-11-27 PROCEDURE — 83690 ASSAY OF LIPASE: CPT | Performed by: EMERGENCY MEDICINE

## 2017-11-27 RX ORDER — ACETAMINOPHEN 500 MG
1000 TABLET ORAL ONCE
Status: COMPLETED | OUTPATIENT
Start: 2017-11-27 | End: 2017-11-27

## 2017-11-27 RX ADMIN — ACETAMINOPHEN 1000 MG: 500 TABLET ORAL at 04:40

## 2017-11-28 ENCOUNTER — HOSPITAL ENCOUNTER (EMERGENCY)
Facility: HOSPITAL | Age: 32
Discharge: LEFT BEFORE DISCHARGE | End: 2017-11-28
Attending: EMERGENCY MEDICINE | Admitting: EMERGENCY MEDICINE

## 2017-11-28 VITALS
WEIGHT: 293 LBS | HEART RATE: 92 BPM | HEIGHT: 64 IN | RESPIRATION RATE: 20 BRPM | SYSTOLIC BLOOD PRESSURE: 125 MMHG | BODY MASS INDEX: 50.02 KG/M2 | OXYGEN SATURATION: 97 % | DIASTOLIC BLOOD PRESSURE: 75 MMHG | TEMPERATURE: 97.9 F

## 2017-11-28 DIAGNOSIS — R10.9 ABDOMINAL PAIN, UNSPECIFIED ABDOMINAL LOCATION: Primary | ICD-10-CM

## 2017-11-28 LAB
6-ACETYL MORPHINE: NEGATIVE
ALBUMIN SERPL-MCNC: 4.3 G/DL (ref 3.5–5)
ALBUMIN/GLOB SERPL: 1.4 G/DL (ref 1.5–2.5)
ALP SERPL-CCNC: 74 U/L (ref 35–104)
ALT SERPL W P-5'-P-CCNC: 54 U/L (ref 10–36)
AMPHET+METHAMPHET UR QL: NEGATIVE
AMYLASE SERPL-CCNC: 45 U/L (ref 28–100)
ANION GAP SERPL CALCULATED.3IONS-SCNC: 10.7 MMOL/L (ref 3.6–11.2)
APTT PPP: 24.9 SECONDS (ref 23.8–36.1)
AST SERPL-CCNC: 48 U/L (ref 10–30)
BARBITURATES UR QL SCN: NEGATIVE
BASOPHILS # BLD AUTO: 0.01 10*3/MM3 (ref 0–0.3)
BASOPHILS NFR BLD AUTO: 0.2 % (ref 0–2)
BENZODIAZ UR QL SCN: NEGATIVE
BILIRUB SERPL-MCNC: 0.3 MG/DL (ref 0.2–1.8)
BILIRUB UR QL STRIP: NEGATIVE
BUN BLD-MCNC: 12 MG/DL (ref 7–21)
BUN/CREAT SERPL: 20 (ref 7–25)
BUPRENORPHINE SERPL-MCNC: NEGATIVE NG/ML
CALCIUM SPEC-SCNC: 9.2 MG/DL (ref 7.7–10)
CANNABINOIDS SERPL QL: NEGATIVE
CHLORIDE SERPL-SCNC: 104 MMOL/L (ref 99–112)
CLARITY UR: CLEAR
CO2 SERPL-SCNC: 22.3 MMOL/L (ref 24.3–31.9)
COCAINE UR QL: NEGATIVE
COLOR UR: YELLOW
CREAT BLD-MCNC: 0.6 MG/DL (ref 0.43–1.29)
DEPRECATED RDW RBC AUTO: 50.5 FL (ref 37–54)
EOSINOPHIL # BLD AUTO: 0.01 10*3/MM3 (ref 0–0.7)
EOSINOPHIL NFR BLD AUTO: 0.2 % (ref 0–5)
ERYTHROCYTE [DISTWIDTH] IN BLOOD BY AUTOMATED COUNT: 17.6 % (ref 11.5–14.5)
GFR SERPL CREATININE-BSD FRML MDRD: 117 ML/MIN/1.73
GLOBULIN UR ELPH-MCNC: 3 GM/DL
GLUCOSE BLD-MCNC: 299 MG/DL (ref 70–110)
GLUCOSE UR STRIP-MCNC: ABNORMAL MG/DL
HCT VFR BLD AUTO: 28.1 % (ref 37–47)
HGB BLD-MCNC: 8.5 G/DL (ref 12–16)
HGB UR QL STRIP.AUTO: NEGATIVE
IMM GRANULOCYTES # BLD: 0.02 10*3/MM3 (ref 0–0.03)
IMM GRANULOCYTES NFR BLD: 0.4 % (ref 0–0.5)
INR PPP: 1.13 (ref 0.9–1.1)
KETONES UR QL STRIP: ABNORMAL
LEUKOCYTE ESTERASE UR QL STRIP.AUTO: NEGATIVE
LIPASE SERPL-CCNC: 58 U/L (ref 13–60)
LYMPHOCYTES # BLD AUTO: 1.93 10*3/MM3 (ref 1–3)
LYMPHOCYTES NFR BLD AUTO: 36.4 % (ref 21–51)
MCH RBC QN AUTO: 24.1 PG (ref 27–33)
MCHC RBC AUTO-ENTMCNC: 30.2 G/DL (ref 33–37)
MCV RBC AUTO: 79.8 FL (ref 80–94)
METHADONE UR QL SCN: NEGATIVE
MONOCYTES # BLD AUTO: 0.51 10*3/MM3 (ref 0.1–0.9)
MONOCYTES NFR BLD AUTO: 9.6 % (ref 0–10)
NEUTROPHILS # BLD AUTO: 2.82 10*3/MM3 (ref 1.4–6.5)
NEUTROPHILS NFR BLD AUTO: 53.2 % (ref 30–70)
NITRITE UR QL STRIP: NEGATIVE
OPIATES UR QL: NEGATIVE
OSMOLALITY SERPL CALC.SUM OF ELEC: 284.7 MOSM/KG (ref 273–305)
OXYCODONE UR QL SCN: NEGATIVE
PCP UR QL SCN: NEGATIVE
PH UR STRIP.AUTO: <=5 [PH] (ref 5–8)
PLATELET # BLD AUTO: 229 10*3/MM3 (ref 130–400)
PMV BLD AUTO: 9.2 FL (ref 6–10)
POTASSIUM BLD-SCNC: 3.7 MMOL/L (ref 3.5–5.3)
PROT SERPL-MCNC: 7.3 G/DL (ref 6–8)
PROT UR QL STRIP: NEGATIVE
PROTHROMBIN TIME: 14.6 SECONDS (ref 11–15.4)
RBC # BLD AUTO: 3.52 10*6/MM3 (ref 4.2–5.4)
SODIUM BLD-SCNC: 137 MMOL/L (ref 135–153)
SP GR UR STRIP: >1.03 (ref 1–1.03)
UROBILINOGEN UR QL STRIP: ABNORMAL
WBC NRBC COR # BLD: 5.3 10*3/MM3 (ref 4.5–12.5)

## 2017-11-28 PROCEDURE — 80307 DRUG TEST PRSMV CHEM ANLYZR: CPT | Performed by: PHYSICIAN ASSISTANT

## 2017-11-28 PROCEDURE — 85730 THROMBOPLASTIN TIME PARTIAL: CPT | Performed by: PHYSICIAN ASSISTANT

## 2017-11-28 PROCEDURE — 83690 ASSAY OF LIPASE: CPT | Performed by: PHYSICIAN ASSISTANT

## 2017-11-28 PROCEDURE — 80053 COMPREHEN METABOLIC PANEL: CPT | Performed by: PHYSICIAN ASSISTANT

## 2017-11-28 PROCEDURE — 85025 COMPLETE CBC W/AUTO DIFF WBC: CPT | Performed by: PHYSICIAN ASSISTANT

## 2017-11-28 PROCEDURE — 99283 EMERGENCY DEPT VISIT LOW MDM: CPT

## 2017-11-28 PROCEDURE — 85610 PROTHROMBIN TIME: CPT | Performed by: PHYSICIAN ASSISTANT

## 2017-11-28 PROCEDURE — 81003 URINALYSIS AUTO W/O SCOPE: CPT | Performed by: PHYSICIAN ASSISTANT

## 2017-11-28 PROCEDURE — 82150 ASSAY OF AMYLASE: CPT | Performed by: PHYSICIAN ASSISTANT

## 2017-11-28 RX ORDER — ACETAMINOPHEN 325 MG/1
975 TABLET ORAL ONCE
Status: COMPLETED | OUTPATIENT
Start: 2017-11-28 | End: 2017-11-28

## 2017-11-28 RX ADMIN — ACETAMINOPHEN 975 MG: 325 TABLET ORAL at 16:09

## 2017-12-01 ENCOUNTER — APPOINTMENT (OUTPATIENT)
Dept: ULTRASOUND IMAGING | Facility: HOSPITAL | Age: 32
End: 2017-12-01

## 2017-12-01 ENCOUNTER — HOSPITAL ENCOUNTER (EMERGENCY)
Facility: HOSPITAL | Age: 32
Discharge: HOME | End: 2017-12-01
Attending: EMERGENCY MEDICINE | Admitting: EMERGENCY MEDICINE

## 2017-12-01 VITALS
WEIGHT: 293 LBS | TEMPERATURE: 98 F | DIASTOLIC BLOOD PRESSURE: 105 MMHG | SYSTOLIC BLOOD PRESSURE: 166 MMHG | BODY MASS INDEX: 50.02 KG/M2 | HEART RATE: 86 BPM | HEIGHT: 64 IN | OXYGEN SATURATION: 98 % | RESPIRATION RATE: 18 BRPM

## 2017-12-01 DIAGNOSIS — R10.9 CHRONIC RIGHT FLANK PAIN: Primary | ICD-10-CM

## 2017-12-01 DIAGNOSIS — D64.9 ANEMIA, UNSPECIFIED TYPE: ICD-10-CM

## 2017-12-01 DIAGNOSIS — G89.29 CHRONIC RIGHT FLANK PAIN: Primary | ICD-10-CM

## 2017-12-01 LAB
ALBUMIN SERPL-MCNC: 4.3 G/DL (ref 3.5–5)
ALBUMIN/GLOB SERPL: 1.3 G/DL (ref 1.5–2.5)
ALP SERPL-CCNC: 70 U/L (ref 35–104)
ALT SERPL W P-5'-P-CCNC: 50 U/L (ref 10–36)
ANION GAP SERPL CALCULATED.3IONS-SCNC: 10.4 MMOL/L (ref 3.6–11.2)
AST SERPL-CCNC: 70 U/L (ref 10–30)
B-HCG UR QL: NEGATIVE
BACTERIA UR QL AUTO: ABNORMAL /HPF
BASOPHILS # BLD AUTO: 0.02 10*3/MM3 (ref 0–0.3)
BASOPHILS NFR BLD AUTO: 0.3 % (ref 0–2)
BILIRUB SERPL-MCNC: 0.3 MG/DL (ref 0.2–1.8)
BILIRUB UR QL STRIP: NEGATIVE
BILIRUB UR QL STRIP: NEGATIVE
BUN BLD-MCNC: 10 MG/DL (ref 7–21)
BUN/CREAT SERPL: 15.2 (ref 7–25)
CALCIUM SPEC-SCNC: 9 MG/DL (ref 7.7–10)
CHLORIDE SERPL-SCNC: 104 MMOL/L (ref 99–112)
CLARITY UR: ABNORMAL
CLARITY UR: CLEAR
CO2 SERPL-SCNC: 22.6 MMOL/L (ref 24.3–31.9)
COLOR UR: ABNORMAL
COLOR UR: YELLOW
CREAT BLD-MCNC: 0.66 MG/DL (ref 0.43–1.29)
DEPRECATED RDW RBC AUTO: 46.9 FL (ref 37–54)
EOSINOPHIL # BLD AUTO: 0.09 10*3/MM3 (ref 0–0.7)
EOSINOPHIL NFR BLD AUTO: 1.5 % (ref 0–5)
ERYTHROCYTE [DISTWIDTH] IN BLOOD BY AUTOMATED COUNT: 17.2 % (ref 11.5–14.5)
GFR SERPL CREATININE-BSD FRML MDRD: 104 ML/MIN/1.73
GLOBULIN UR ELPH-MCNC: 3.2 GM/DL
GLUCOSE BLD-MCNC: 209 MG/DL (ref 70–110)
GLUCOSE UR STRIP-MCNC: NEGATIVE MG/DL
GLUCOSE UR STRIP-MCNC: NEGATIVE MG/DL
HCT VFR BLD AUTO: 29 % (ref 37–47)
HGB BLD-MCNC: 8.7 G/DL (ref 12–16)
HGB UR QL STRIP.AUTO: ABNORMAL
HGB UR QL STRIP.AUTO: NEGATIVE
HOLD SPECIMEN: NORMAL
HOLD SPECIMEN: NORMAL
HYALINE CASTS UR QL AUTO: ABNORMAL /LPF
IMM GRANULOCYTES # BLD: 0.01 10*3/MM3 (ref 0–0.03)
IMM GRANULOCYTES NFR BLD: 0.2 % (ref 0–0.5)
INR PPP: 1.44 (ref 0.9–1.1)
KETONES UR QL STRIP: ABNORMAL
KETONES UR QL STRIP: ABNORMAL
LEUKOCYTE ESTERASE UR QL STRIP.AUTO: ABNORMAL
LEUKOCYTE ESTERASE UR QL STRIP.AUTO: NEGATIVE
LYMPHOCYTES # BLD AUTO: 1.73 10*3/MM3 (ref 1–3)
LYMPHOCYTES NFR BLD AUTO: 29.8 % (ref 21–51)
MCH RBC QN AUTO: 23.6 PG (ref 27–33)
MCHC RBC AUTO-ENTMCNC: 30 G/DL (ref 33–37)
MCV RBC AUTO: 78.8 FL (ref 80–94)
MONOCYTES # BLD AUTO: 0.45 10*3/MM3 (ref 0.1–0.9)
MONOCYTES NFR BLD AUTO: 7.7 % (ref 0–10)
NEUTROPHILS # BLD AUTO: 3.51 10*3/MM3 (ref 1.4–6.5)
NEUTROPHILS NFR BLD AUTO: 60.5 % (ref 30–70)
NITRITE UR QL STRIP: NEGATIVE
NITRITE UR QL STRIP: NEGATIVE
OSMOLALITY SERPL CALC.SUM OF ELEC: 279 MOSM/KG (ref 273–305)
PH UR STRIP.AUTO: 6.5 [PH] (ref 5–8)
PH UR STRIP.AUTO: 6.5 [PH] (ref 5–8)
PLATELET # BLD AUTO: 257 10*3/MM3 (ref 130–400)
PMV BLD AUTO: 9.5 FL (ref 6–10)
POTASSIUM BLD-SCNC: 4.1 MMOL/L (ref 3.5–5.3)
PROT SERPL-MCNC: 7.5 G/DL (ref 6–8)
PROT UR QL STRIP: NEGATIVE
PROT UR QL STRIP: NEGATIVE
PROTHROMBIN TIME: 17.7 SECONDS (ref 11–15.4)
RBC # BLD AUTO: 3.68 10*6/MM3 (ref 4.2–5.4)
RBC # UR: ABNORMAL /HPF
REF LAB TEST METHOD: ABNORMAL
SODIUM BLD-SCNC: 137 MMOL/L (ref 135–153)
SP GR UR STRIP: 1.02 (ref 1–1.03)
SP GR UR STRIP: 1.02 (ref 1–1.03)
SQUAMOUS #/AREA URNS HPF: ABNORMAL /HPF
UROBILINOGEN UR QL STRIP: ABNORMAL
UROBILINOGEN UR QL STRIP: ABNORMAL
WBC NRBC COR # BLD: 5.81 10*3/MM3 (ref 4.5–12.5)
WBC UR QL AUTO: ABNORMAL /HPF
WHOLE BLOOD HOLD SPECIMEN: NORMAL
WHOLE BLOOD HOLD SPECIMEN: NORMAL

## 2017-12-01 PROCEDURE — 99284 EMERGENCY DEPT VISIT MOD MDM: CPT

## 2017-12-01 PROCEDURE — 81001 URINALYSIS AUTO W/SCOPE: CPT | Performed by: PHYSICIAN ASSISTANT

## 2017-12-01 PROCEDURE — 85610 PROTHROMBIN TIME: CPT | Performed by: PHYSICIAN ASSISTANT

## 2017-12-01 PROCEDURE — 76775 US EXAM ABDO BACK WALL LIM: CPT

## 2017-12-01 PROCEDURE — 76775 US EXAM ABDO BACK WALL LIM: CPT | Performed by: RADIOLOGY

## 2017-12-01 PROCEDURE — P9612 CATHETERIZE FOR URINE SPEC: HCPCS

## 2017-12-01 PROCEDURE — 80053 COMPREHEN METABOLIC PANEL: CPT | Performed by: PHYSICIAN ASSISTANT

## 2017-12-01 PROCEDURE — 85025 COMPLETE CBC W/AUTO DIFF WBC: CPT | Performed by: PHYSICIAN ASSISTANT

## 2017-12-01 PROCEDURE — 81025 URINE PREGNANCY TEST: CPT | Performed by: PHYSICIAN ASSISTANT

## 2017-12-01 PROCEDURE — 81003 URINALYSIS AUTO W/O SCOPE: CPT | Performed by: EMERGENCY MEDICINE

## 2017-12-01 RX ORDER — WARFARIN SODIUM 5 MG/1
15 TABLET ORAL ONCE
Status: DISCONTINUED | OUTPATIENT
Start: 2017-12-01 | End: 2017-12-01

## 2017-12-01 RX ORDER — SODIUM CHLORIDE 0.9 % (FLUSH) 0.9 %
10 SYRINGE (ML) INJECTION AS NEEDED
Status: DISCONTINUED | OUTPATIENT
Start: 2017-12-01 | End: 2017-12-01 | Stop reason: HOSPADM

## 2017-12-01 RX ORDER — WARFARIN SODIUM 3 MG/1
3 TABLET ORAL ONCE
Status: DISCONTINUED | OUTPATIENT
Start: 2017-12-01 | End: 2017-12-01 | Stop reason: HOSPADM

## 2017-12-01 RX ORDER — WARFARIN SODIUM 3 MG/1
3 TABLET ORAL ONCE
Status: DISCONTINUED | OUTPATIENT
Start: 2017-12-01 | End: 2017-12-01

## 2017-12-01 RX ORDER — HYDROCODONE BITARTRATE AND ACETAMINOPHEN 5; 325 MG/1; MG/1
1 TABLET ORAL ONCE
Status: COMPLETED | OUTPATIENT
Start: 2017-12-01 | End: 2017-12-01

## 2017-12-01 RX ADMIN — HYDROCODONE BITARTRATE AND ACETAMINOPHEN 1 TABLET: 5; 325 TABLET ORAL at 09:03

## 2017-12-01 NOTE — ED NOTES
In room to administer warfarin.  Pt not present.  Family not present.  Pt's belongings gone.  Unable to locate pt in restrooms, hallways, or waiting areas.  Provider notified.     Pallavi Miranda RN  12/01/17 0802

## 2017-12-01 NOTE — ED PROVIDER NOTES
Subjective   Patient is a 32 y.o. female presenting with flank pain.   Flank Pain   Pain location:  R flank  Pain quality: sharp    Pain radiates to:  RLQ  Pain severity:  Moderate  Onset quality:  Gradual  Duration:  2 days  Timing:  Constant  Chronicity:  Recurrent  Context comment:  Patient has had 33 CAT scans of her abdomen and pelvis 2 years in our system.  She has recurrent pain.  She states that there might be some blood tinge in her urine.  She feels that she's a kidney stone.  Longer is followed with urology or primary care.  Relieved by:  Nothing  Worsened by:  Nothing  Associated symptoms: hematuria    Associated symptoms: no chills, no fever and no shortness of breath        Review of Systems   Constitutional: Negative for chills and fever.   Respiratory: Negative for shortness of breath.    Genitourinary: Positive for flank pain and hematuria.       Past Medical History:   Diagnosis Date   • Depression    • Diabetes mellitus    • DVT (deep venous thrombosis)    • GERD (gastroesophageal reflux disease)    • Gout    • Hyperlipidemia    • Hypertension    • Hypothyroid    • Kidney stone    • Migraine    • Neuropathy    • PE (pulmonary embolism)        Allergies   Allergen Reactions   • Amoxicillin Hives   • Penicillins Hives   • Toradol [Ketorolac Tromethamine] Hives       Past Surgical History:   Procedure Laterality Date   • CARDIAC SURGERY      Heart Cath done in    •  SECTION     • CHOLECYSTECTOMY     • COLONOSCOPY         Family History   Problem Relation Age of Onset   • No Known Problems Mother    • No Known Problems Father    • No Known Problems Sister    • No Known Problems Brother    • No Known Problems Son    • No Known Problems Daughter    • No Known Problems Maternal Grandmother    • No Known Problems Maternal Grandfather    • No Known Problems Paternal Grandmother    • No Known Problems Paternal Grandfather    • No Known Problems Cousin    • Rheum arthritis Neg Hx    •  Osteoarthritis Neg Hx    • Asthma Neg Hx    • Diabetes Neg Hx    • Heart failure Neg Hx    • Hyperlipidemia Neg Hx    • Hypertension Neg Hx    • Migraines Neg Hx    • Rashes / Skin problems Neg Hx    • Seizures Neg Hx    • Stroke Neg Hx    • Thyroid disease Neg Hx        Social History     Social History   • Marital status:      Spouse name: N/A   • Number of children: N/A   • Years of education: N/A     Social History Main Topics   • Smoking status: Never Smoker   • Smokeless tobacco: Never Used   • Alcohol use No   • Drug use: No   • Sexual activity: Defer     Other Topics Concern   • None     Social History Narrative   • None           Objective   Physical Exam   Constitutional: She is oriented to person, place, and time. She appears well-developed and well-nourished. No distress.   HENT:   Head: Normocephalic and atraumatic.   Right Ear: External ear normal.   Left Ear: External ear normal.   Nose: Nose normal.   Eyes: Conjunctivae and EOM are normal. Pupils are equal, round, and reactive to light.   Neck: Normal range of motion. Neck supple. No JVD present. No tracheal deviation present.   Cardiovascular: Normal rate, regular rhythm and normal heart sounds.    No murmur heard.  Pulmonary/Chest: Effort normal and breath sounds normal. No respiratory distress. She has no wheezes.   Abdominal: Soft. Bowel sounds are normal. There is no tenderness. There is no rebound and no guarding.   Musculoskeletal: Normal range of motion. She exhibits no edema or deformity.   Neurological: She is alert and oriented to person, place, and time. No cranial nerve deficit.   Skin: Skin is warm and dry. No rash noted. She is not diaphoretic. No erythema. No pallor.   Psychiatric: She has a normal mood and affect. Her behavior is normal. Thought content normal.   Nursing note and vitals reviewed.      Procedures         ED Course  ED Course   Comment By Time   Patient's hemoglobin is chronically low though stable. Roly SIMPSON  PRICE Cope 12/01 0839   Patient originally had a midstream urine which showed too numerous ribs.  She had a catheter specimen was normal. PRICE Gonzalez 12/01 0937   Patien reports that she takes her Coumadin 12 mg every day.  She has not taken it today yet.  We'll give her 15 mg which is what she usually takes when her INR is low.  She is to continue taking 15 mg until she sees her primary care on Monday.  She was counseled about not getting CAT scan unless absolutely necessary.  She is also to follow-up about her hemoglobin.  Given the number for urology.  Her repeat urine with a catheter specimen had absolutely no red blood cells but her clean catch had too numerous to count.  She is not currently on her period. PRICE Gonzalez 12/01 0941   Patient states that she did take her 12 mg Coumadin last night.  We will give her 3 mg here and then she is to resume 15 mg tonight PRICE Gonzalez 12/01 1013                  MDM  Number of Diagnoses or Management Options  established and worsening  established and worsening     Amount and/or Complexity of Data Reviewed  Clinical lab tests: reviewed and ordered  Tests in the radiology section of CPT®: reviewed and ordered  Decide to obtain previous medical records or to obtain history from someone other than the patient: yes  Discuss the patient with other providers: yes  Independent visualization of images, tracings, or specimens: yes    Risk of Complications, Morbidity, and/or Mortality  Presenting problems: moderate        Final diagnoses:   Chronic right flank pain   Anemia, unspecified type            PRICE Gonzalez  12/01/17 8050

## 2017-12-01 NOTE — DISCHARGE INSTRUCTIONS

## 2017-12-13 ENCOUNTER — HOSPITAL ENCOUNTER (EMERGENCY)
Facility: HOSPITAL | Age: 32
Discharge: HOME | End: 2017-12-13
Attending: EMERGENCY MEDICINE | Admitting: EMERGENCY MEDICINE

## 2017-12-13 VITALS
WEIGHT: 293 LBS | HEIGHT: 64 IN | TEMPERATURE: 97.8 F | HEART RATE: 82 BPM | OXYGEN SATURATION: 97 % | SYSTOLIC BLOOD PRESSURE: 176 MMHG | DIASTOLIC BLOOD PRESSURE: 85 MMHG | RESPIRATION RATE: 16 BRPM | BODY MASS INDEX: 50.02 KG/M2

## 2017-12-13 DIAGNOSIS — R10.9 CHRONIC RIGHT FLANK PAIN: Primary | ICD-10-CM

## 2017-12-13 DIAGNOSIS — G89.29 CHRONIC RIGHT FLANK PAIN: Primary | ICD-10-CM

## 2017-12-13 LAB
ALBUMIN SERPL-MCNC: 4.2 G/DL (ref 3.5–5)
ALBUMIN/GLOB SERPL: 1.5 G/DL (ref 1.5–2.5)
ALP SERPL-CCNC: 73 U/L (ref 35–104)
ALT SERPL W P-5'-P-CCNC: 38 U/L (ref 10–36)
ANION GAP SERPL CALCULATED.3IONS-SCNC: 12.4 MMOL/L (ref 3.6–11.2)
AST SERPL-CCNC: 51 U/L (ref 10–30)
BASOPHILS # BLD AUTO: 0.01 10*3/MM3 (ref 0–0.3)
BASOPHILS NFR BLD AUTO: 0.2 % (ref 0–2)
BILIRUB SERPL-MCNC: 0.3 MG/DL (ref 0.2–1.8)
BILIRUB UR QL STRIP: NEGATIVE
BUN BLD-MCNC: 8 MG/DL (ref 7–21)
BUN/CREAT SERPL: 12.9 (ref 7–25)
CALCIUM SPEC-SCNC: 8.8 MG/DL (ref 7.7–10)
CHLORIDE SERPL-SCNC: 104 MMOL/L (ref 99–112)
CLARITY UR: CLEAR
CO2 SERPL-SCNC: 20.6 MMOL/L (ref 24.3–31.9)
COLOR UR: YELLOW
CREAT BLD-MCNC: 0.62 MG/DL (ref 0.43–1.29)
DEPRECATED RDW RBC AUTO: 46.7 FL (ref 37–54)
EOSINOPHIL # BLD AUTO: 0.08 10*3/MM3 (ref 0–0.7)
EOSINOPHIL NFR BLD AUTO: 2 % (ref 0–5)
ERYTHROCYTE [DISTWIDTH] IN BLOOD BY AUTOMATED COUNT: 17.2 % (ref 11.5–14.5)
GFR SERPL CREATININE-BSD FRML MDRD: 112 ML/MIN/1.73
GLOBULIN UR ELPH-MCNC: 2.8 GM/DL
GLUCOSE BLD-MCNC: 372 MG/DL (ref 70–110)
GLUCOSE UR STRIP-MCNC: ABNORMAL MG/DL
HCT VFR BLD AUTO: 27.2 % (ref 37–47)
HGB BLD-MCNC: 8.2 G/DL (ref 12–16)
HGB UR QL STRIP.AUTO: NEGATIVE
HOLD SPECIMEN: NORMAL
HOLD SPECIMEN: NORMAL
IMM GRANULOCYTES # BLD: 0 10*3/MM3 (ref 0–0.03)
IMM GRANULOCYTES NFR BLD: 0 % (ref 0–0.5)
KETONES UR QL STRIP: ABNORMAL
LEUKOCYTE ESTERASE UR QL STRIP.AUTO: NEGATIVE
LYMPHOCYTES # BLD AUTO: 1.34 10*3/MM3 (ref 1–3)
LYMPHOCYTES NFR BLD AUTO: 32.8 % (ref 21–51)
MCH RBC QN AUTO: 23.7 PG (ref 27–33)
MCHC RBC AUTO-ENTMCNC: 30.1 G/DL (ref 33–37)
MCV RBC AUTO: 78.6 FL (ref 80–94)
MONOCYTES # BLD AUTO: 0.32 10*3/MM3 (ref 0.1–0.9)
MONOCYTES NFR BLD AUTO: 7.8 % (ref 0–10)
NEUTROPHILS # BLD AUTO: 2.34 10*3/MM3 (ref 1.4–6.5)
NEUTROPHILS NFR BLD AUTO: 57.2 % (ref 30–70)
NITRITE UR QL STRIP: NEGATIVE
OSMOLALITY SERPL CALC.SUM OF ELEC: 287.3 MOSM/KG (ref 273–305)
PH UR STRIP.AUTO: 7 [PH] (ref 5–8)
PLATELET # BLD AUTO: 258 10*3/MM3 (ref 130–400)
PMV BLD AUTO: 9.9 FL (ref 6–10)
POTASSIUM BLD-SCNC: 4.3 MMOL/L (ref 3.5–5.3)
PROT SERPL-MCNC: 7 G/DL (ref 6–8)
PROT UR QL STRIP: NEGATIVE
RBC # BLD AUTO: 3.46 10*6/MM3 (ref 4.2–5.4)
SODIUM BLD-SCNC: 137 MMOL/L (ref 135–153)
SP GR UR STRIP: 1.02 (ref 1–1.03)
UROBILINOGEN UR QL STRIP: ABNORMAL
WBC NRBC COR # BLD: 4.09 10*3/MM3 (ref 4.5–12.5)
WHOLE BLOOD HOLD SPECIMEN: NORMAL
WHOLE BLOOD HOLD SPECIMEN: NORMAL

## 2017-12-13 PROCEDURE — 99283 EMERGENCY DEPT VISIT LOW MDM: CPT

## 2017-12-13 PROCEDURE — 80053 COMPREHEN METABOLIC PANEL: CPT | Performed by: EMERGENCY MEDICINE

## 2017-12-13 PROCEDURE — 85025 COMPLETE CBC W/AUTO DIFF WBC: CPT | Performed by: EMERGENCY MEDICINE

## 2017-12-13 PROCEDURE — 81003 URINALYSIS AUTO W/O SCOPE: CPT | Performed by: EMERGENCY MEDICINE

## 2017-12-13 RX ORDER — HYDROCODONE BITARTRATE AND ACETAMINOPHEN 5; 325 MG/1; MG/1
1 TABLET ORAL ONCE
Status: COMPLETED | OUTPATIENT
Start: 2017-12-13 | End: 2017-12-13

## 2017-12-13 RX ORDER — DICLOFENAC SODIUM 75 MG/1
75 TABLET, DELAYED RELEASE ORAL 2 TIMES DAILY
Qty: 20 TABLET | Refills: 0 | Status: SHIPPED | OUTPATIENT
Start: 2017-12-13 | End: 2019-04-25

## 2017-12-13 RX ORDER — ONDANSETRON 4 MG/1
4 TABLET, ORALLY DISINTEGRATING ORAL ONCE
Status: COMPLETED | OUTPATIENT
Start: 2017-12-13 | End: 2017-12-13

## 2017-12-13 RX ADMIN — ONDANSETRON 4 MG: 4 TABLET, ORALLY DISINTEGRATING ORAL at 15:06

## 2017-12-13 RX ADMIN — HYDROCODONE BITARTRATE AND ACETAMINOPHEN 1 TABLET: 5; 325 TABLET ORAL at 15:18

## 2017-12-13 NOTE — DISCHARGE INSTRUCTIONS

## 2017-12-14 NOTE — ED PROVIDER NOTES
Subjective   HPI Comments: Well known to ED staff. Hx of chronic pain. Hx of hematuria resolved with catherization    Patient is a 32 y.o. female presenting with flank pain.   History provided by:  Patient   used: No    Flank Pain   Pain location:  R flank  Pain quality: aching, sharp and stabbing    Pain radiates to:  RLQ  Pain severity:  Severe  Onset quality:  Sudden  Duration:  1 day  Timing:  Constant  Progression:  Worsening  Chronicity:  Chronic  Context: sick contacts    Relieved by:  Nothing  Worsened by:  Movement  Ineffective treatments:  Acetaminophen and not moving  Associated symptoms: dysuria, hematuria and nausea    Associated symptoms: no chest pain and no fever    Risk factors: obesity        Review of Systems   Constitutional: Negative.  Negative for fever.   HENT: Negative.    Respiratory: Negative.    Cardiovascular: Negative.  Negative for chest pain.   Gastrointestinal: Positive for nausea. Negative for abdominal pain.   Endocrine: Negative.    Genitourinary: Positive for dysuria, flank pain and hematuria.   Skin: Negative.    Neurological: Negative.    Psychiatric/Behavioral: Negative.    All other systems reviewed and are negative.      Past Medical History:   Diagnosis Date   • Depression    • Diabetes mellitus    • DVT (deep venous thrombosis)    • GERD (gastroesophageal reflux disease)    • Gout    • Hyperlipidemia    • Hypertension    • Hypothyroid    • Kidney stone    • Migraine    • Neuropathy    • PE (pulmonary embolism)        Allergies   Allergen Reactions   • Amoxicillin Hives   • Penicillins Hives   • Toradol [Ketorolac Tromethamine] Hives       Past Surgical History:   Procedure Laterality Date   • CARDIAC SURGERY      Heart Cath done in    •  SECTION     • CHOLECYSTECTOMY     • COLONOSCOPY         Family History   Problem Relation Age of Onset   • No Known Problems Mother    • No Known Problems Father    • No Known Problems Sister    • No Known  Problems Brother    • No Known Problems Son    • No Known Problems Daughter    • No Known Problems Maternal Grandmother    • No Known Problems Maternal Grandfather    • No Known Problems Paternal Grandmother    • No Known Problems Paternal Grandfather    • No Known Problems Cousin    • Rheum arthritis Neg Hx    • Osteoarthritis Neg Hx    • Asthma Neg Hx    • Diabetes Neg Hx    • Heart failure Neg Hx    • Hyperlipidemia Neg Hx    • Hypertension Neg Hx    • Migraines Neg Hx    • Rashes / Skin problems Neg Hx    • Seizures Neg Hx    • Stroke Neg Hx    • Thyroid disease Neg Hx        Social History     Social History   • Marital status:      Spouse name: N/A   • Number of children: N/A   • Years of education: N/A     Social History Main Topics   • Smoking status: Never Smoker   • Smokeless tobacco: Never Used   • Alcohol use No   • Drug use: No   • Sexual activity: Defer     Other Topics Concern   • None     Social History Narrative   • None           Objective   Physical Exam   Constitutional: She is oriented to person, place, and time. She appears well-developed and well-nourished. No distress.   Appears in pain   HENT:   Head: Normocephalic and atraumatic.   Right Ear: External ear normal.   Left Ear: External ear normal.   Nose: Nose normal.   Eyes: Conjunctivae and EOM are normal. Pupils are equal, round, and reactive to light.   Neck: Normal range of motion. Neck supple. No JVD present. No tracheal deviation present.   Cardiovascular: Normal rate, regular rhythm and normal heart sounds.    No murmur heard.  Pulmonary/Chest: Effort normal and breath sounds normal. No respiratory distress. She has no wheezes.   Abdominal: Soft. Bowel sounds are normal. There is no tenderness.   Musculoskeletal: Normal range of motion. She exhibits no edema or deformity.   Neurological: She is alert and oriented to person, place, and time. No cranial nerve deficit.   Skin: Skin is warm and dry. No rash noted. She is not  diaphoretic. No erythema. No pallor.   Psychiatric: She has a normal mood and affect. Her behavior is normal. Thought content normal.   Nursing note and vitals reviewed.      Procedures        Lab Results (last 24 hours)     Procedure Component Value Units Date/Time    CBC & Differential [[id number]] Collected:  12/13/17 1405    Specimen:  Blood Updated:  12/13/17 1516    Narrative:       The following orders were created for panel order CBC & Differential.  Procedure                               Abnormality         Status                     ---------                               -----------         ------                     CBC Auto Differential[[id number]]        Abnormal            Final result                 Please view results for these tests on the individual orders.    Comprehensive Metabolic Panel [[id number]]  (Abnormal) Collected:  12/13/17 1405    Specimen:  Blood Updated:  12/13/17 1530     Glucose 372 (H) mg/dL      BUN 8 mg/dL      Creatinine 0.62 mg/dL      Sodium 137 mmol/L      Potassium 4.3 mmol/L      Chloride 104 mmol/L      CO2 20.6 (L) mmol/L      Calcium 8.8 mg/dL      Total Protein 7.0 g/dL      Albumin 4.20 g/dL      ALT (SGPT) 38 (H) U/L      AST (SGOT) 51 (H) U/L      Alkaline Phosphatase 73 U/L       Note New Reference Ranges        Total Bilirubin 0.3 mg/dL      eGFR Non African Amer 112 mL/min/1.73      Globulin 2.8 gm/dL      A/G Ratio 1.5 g/dL      BUN/Creatinine Ratio 12.9     Anion Gap 12.4 (H) mmol/L     CBC Auto Differential [[id number]]  (Abnormal) Collected:  12/13/17 1405    Specimen:  Blood Updated:  12/13/17 1516     WBC 4.09 (L) 10*3/mm3      RBC 3.46 (L) 10*6/mm3      Hemoglobin 8.2 (L) g/dL      Hematocrit 27.2 (L) %      MCV 78.6 (L) fL      MCH 23.7 (L) pg      MCHC 30.1 (L) g/dL      RDW 17.2 (H) %      RDW-SD 46.7 fl      MPV 9.9 fL      Platelets 258 10*3/mm3      Neutrophil % 57.2 %      Lymphocyte % 32.8 %      Monocyte % 7.8 %      Eosinophil % 2.0 %      Basophil  % 0.2 %      Immature Grans % 0.0 %      Neutrophils, Absolute 2.34 10*3/mm3      Lymphocytes, Absolute 1.34 10*3/mm3      Monocytes, Absolute 0.32 10*3/mm3      Eosinophils, Absolute 0.08 10*3/mm3      Basophils, Absolute 0.01 10*3/mm3      Immature Grans, Absolute 0.00 10*3/mm3     Urinalysis With / Culture If Indicated - Urine, Clean Catch [[id number]]  (Abnormal) Collected:  12/13/17 1505    Specimen:  Urine from Urine, Clean Catch Updated:  12/13/17 1514     Color, UA Yellow     Appearance, UA Clear     pH, UA 7.0     Specific Gravity, UA 1.025     Glucose, UA >=1000 mg/dL (3+) (A)     Ketones, UA Trace (A)     Bilirubin, UA Negative     Blood, UA Negative     Protein, UA Negative     Leuk Esterase, UA Negative     Nitrite, UA Negative     Urobilinogen, UA 0.2 E.U./dL    Narrative:       Urine microscopic not indicated.            ED Course  ED Course    Discussed chronicity of pain and would not be getting narcotics from ED              MDM    Final diagnoses:   Chronic right flank pain            Neo Cruz MD  12/14/17 4822

## 2017-12-29 ENCOUNTER — HOSPITAL ENCOUNTER (EMERGENCY)
Facility: HOSPITAL | Age: 32
Discharge: HOME | End: 2017-12-29
Attending: EMERGENCY MEDICINE | Admitting: EMERGENCY MEDICINE

## 2017-12-29 VITALS
RESPIRATION RATE: 16 BRPM | DIASTOLIC BLOOD PRESSURE: 87 MMHG | BODY MASS INDEX: 50.02 KG/M2 | HEIGHT: 64 IN | SYSTOLIC BLOOD PRESSURE: 177 MMHG | HEART RATE: 95 BPM | WEIGHT: 293 LBS | OXYGEN SATURATION: 95 % | TEMPERATURE: 98.1 F

## 2017-12-29 DIAGNOSIS — R10.9 RIGHT FLANK PAIN: Primary | ICD-10-CM

## 2017-12-29 LAB
ALBUMIN SERPL-MCNC: 4.3 G/DL (ref 3.2–4.8)
ALBUMIN/GLOB SERPL: 1.5 G/DL (ref 1.5–2.5)
ALP SERPL-CCNC: 76 U/L (ref 25–100)
ALT SERPL W P-5'-P-CCNC: 48 U/L (ref 7–40)
ANION GAP SERPL CALCULATED.3IONS-SCNC: 11 MMOL/L (ref 3–11)
AST SERPL-CCNC: 70 U/L (ref 0–33)
BACTERIA UR QL AUTO: ABNORMAL /HPF
BASOPHILS # BLD AUTO: 0.02 10*3/MM3 (ref 0–0.2)
BASOPHILS NFR BLD AUTO: 0.4 % (ref 0–1)
BILIRUB SERPL-MCNC: 0.4 MG/DL (ref 0.3–1.2)
BILIRUB UR QL STRIP: ABNORMAL
BUN BLD-MCNC: 15 MG/DL (ref 9–23)
BUN/CREAT SERPL: 21.4 (ref 7–25)
CALCIUM SPEC-SCNC: 9.4 MG/DL (ref 8.7–10.4)
CHLORIDE SERPL-SCNC: 105 MMOL/L (ref 99–109)
CLARITY UR: ABNORMAL
CO2 SERPL-SCNC: 24 MMOL/L (ref 20–31)
COLOR UR: YELLOW
CREAT BLD-MCNC: 0.7 MG/DL (ref 0.6–1.3)
DEPRECATED RDW RBC AUTO: 59.4 FL (ref 37–54)
EOSINOPHIL # BLD AUTO: 0.08 10*3/MM3 (ref 0–0.3)
EOSINOPHIL NFR BLD AUTO: 1.6 % (ref 0–3)
ERYTHROCYTE [DISTWIDTH] IN BLOOD BY AUTOMATED COUNT: 20.4 % (ref 11.3–14.5)
GFR SERPL CREATININE-BSD FRML MDRD: 98 ML/MIN/1.73
GLOBULIN UR ELPH-MCNC: 2.8 GM/DL
GLUCOSE BLD-MCNC: 278 MG/DL (ref 70–100)
GLUCOSE UR STRIP-MCNC: NEGATIVE MG/DL
HCT VFR BLD AUTO: 31.2 % (ref 34.5–44)
HGB BLD-MCNC: 9.2 G/DL (ref 11.5–15.5)
HGB UR QL STRIP.AUTO: NEGATIVE
HOLD SPECIMEN: NORMAL
HOLD SPECIMEN: NORMAL
HYALINE CASTS UR QL AUTO: ABNORMAL /LPF
IMM GRANULOCYTES # BLD: 0.01 10*3/MM3 (ref 0–0.03)
IMM GRANULOCYTES NFR BLD: 0.2 % (ref 0–0.6)
INR PPP: 1.24
KETONES UR QL STRIP: NEGATIVE
LEUKOCYTE ESTERASE UR QL STRIP.AUTO: NEGATIVE
LIPASE SERPL-CCNC: 32 U/L (ref 6–51)
LYMPHOCYTES # BLD AUTO: 1.87 10*3/MM3 (ref 0.6–4.8)
LYMPHOCYTES NFR BLD AUTO: 38 % (ref 24–44)
MCH RBC QN AUTO: 24.2 PG (ref 27–31)
MCHC RBC AUTO-ENTMCNC: 29.5 G/DL (ref 32–36)
MCV RBC AUTO: 82.1 FL (ref 80–99)
MONOCYTES # BLD AUTO: 0.39 10*3/MM3 (ref 0–1)
MONOCYTES NFR BLD AUTO: 7.9 % (ref 0–12)
NEUTROPHILS # BLD AUTO: 2.55 10*3/MM3 (ref 1.5–8.3)
NEUTROPHILS NFR BLD AUTO: 51.9 % (ref 41–71)
NITRITE UR QL STRIP: NEGATIVE
PH UR STRIP.AUTO: 5.5 [PH] (ref 5–8)
PLATELET # BLD AUTO: 261 10*3/MM3 (ref 150–450)
PMV BLD AUTO: 9.2 FL (ref 6–12)
POTASSIUM BLD-SCNC: 4 MMOL/L (ref 3.5–5.5)
PROT SERPL-MCNC: 7.1 G/DL (ref 5.7–8.2)
PROT UR QL STRIP: NEGATIVE
PROTHROMBIN TIME: 13.6 SECONDS (ref 9.6–11.5)
RBC # BLD AUTO: 3.8 10*6/MM3 (ref 3.89–5.14)
RBC # UR: ABNORMAL /HPF
REF LAB TEST METHOD: ABNORMAL
SODIUM BLD-SCNC: 140 MMOL/L (ref 132–146)
SP GR UR STRIP: 1.04 (ref 1–1.03)
SQUAMOUS #/AREA URNS HPF: ABNORMAL /HPF
UROBILINOGEN UR QL STRIP: ABNORMAL
WBC NRBC COR # BLD: 4.92 10*3/MM3 (ref 3.5–10.8)
WBC UR QL AUTO: ABNORMAL /HPF
WHOLE BLOOD HOLD SPECIMEN: NORMAL
WHOLE BLOOD HOLD SPECIMEN: NORMAL

## 2017-12-29 PROCEDURE — 80053 COMPREHEN METABOLIC PANEL: CPT | Performed by: EMERGENCY MEDICINE

## 2017-12-29 PROCEDURE — 81001 URINALYSIS AUTO W/SCOPE: CPT | Performed by: EMERGENCY MEDICINE

## 2017-12-29 PROCEDURE — 85025 COMPLETE CBC W/AUTO DIFF WBC: CPT | Performed by: EMERGENCY MEDICINE

## 2017-12-29 PROCEDURE — 99282 EMERGENCY DEPT VISIT SF MDM: CPT

## 2017-12-29 PROCEDURE — 85610 PROTHROMBIN TIME: CPT | Performed by: EMERGENCY MEDICINE

## 2017-12-29 PROCEDURE — 83690 ASSAY OF LIPASE: CPT | Performed by: EMERGENCY MEDICINE

## 2017-12-29 RX ORDER — SODIUM CHLORIDE 0.9 % (FLUSH) 0.9 %
10 SYRINGE (ML) INJECTION AS NEEDED
Status: DISCONTINUED | OUTPATIENT
Start: 2017-12-29 | End: 2017-12-29 | Stop reason: HOSPADM

## 2018-01-20 ENCOUNTER — HOSPITAL ENCOUNTER (EMERGENCY)
Dept: HOSPITAL 22 - ER | Age: 33
Discharge: LEFT BEFORE DISCHARGE | End: 2018-01-20
Payer: COMMERCIAL

## 2018-01-20 DIAGNOSIS — Z53.29: Primary | ICD-10-CM

## 2018-01-20 PROCEDURE — 99211 OFF/OP EST MAY X REQ PHY/QHP: CPT

## 2018-01-21 ENCOUNTER — HOSPITAL ENCOUNTER (EMERGENCY)
Facility: HOSPITAL | Age: 33
Discharge: HOME | End: 2018-01-21
Attending: STUDENT IN AN ORGANIZED HEALTH CARE EDUCATION/TRAINING PROGRAM | Admitting: STUDENT IN AN ORGANIZED HEALTH CARE EDUCATION/TRAINING PROGRAM

## 2018-01-21 ENCOUNTER — APPOINTMENT (OUTPATIENT)
Dept: CT IMAGING | Facility: HOSPITAL | Age: 33
End: 2018-01-21

## 2018-01-21 ENCOUNTER — APPOINTMENT (OUTPATIENT)
Dept: ULTRASOUND IMAGING | Facility: HOSPITAL | Age: 33
End: 2018-01-21

## 2018-01-21 VITALS
SYSTOLIC BLOOD PRESSURE: 127 MMHG | RESPIRATION RATE: 18 BRPM | TEMPERATURE: 98.4 F | HEART RATE: 93 BPM | BODY MASS INDEX: 57.52 KG/M2 | WEIGHT: 293 LBS | HEIGHT: 60 IN | DIASTOLIC BLOOD PRESSURE: 94 MMHG | OXYGEN SATURATION: 92 %

## 2018-01-21 DIAGNOSIS — N83.209 HEMORRHAGIC OVARIAN CYST: Primary | ICD-10-CM

## 2018-01-21 LAB
ALBUMIN SERPL-MCNC: 4.6 G/DL (ref 3.5–5)
ALBUMIN/GLOB SERPL: 1.2 G/DL (ref 1–2)
ALP SERPL-CCNC: 85 U/L (ref 38–126)
ALT SERPL W P-5'-P-CCNC: 82 U/L (ref 13–69)
ANION GAP SERPL CALCULATED.3IONS-SCNC: 18.3 MMOL/L
AST SERPL-CCNC: 181 U/L (ref 15–46)
B-HCG UR QL: NEGATIVE
BACTERIA UR QL AUTO: ABNORMAL /HPF
BASOPHILS # BLD AUTO: 0.04 10*3/MM3 (ref 0–0.2)
BASOPHILS NFR BLD AUTO: 0.7 % (ref 0–2.5)
BILIRUB SERPL-MCNC: 0.4 MG/DL (ref 0.2–1.3)
BILIRUB UR QL STRIP: ABNORMAL
BUN BLD-MCNC: 10 MG/DL (ref 7–20)
BUN/CREAT SERPL: 20 (ref 7.1–23.5)
CALCIUM SPEC-SCNC: 10 MG/DL (ref 8.4–10.2)
CHLORIDE SERPL-SCNC: 103 MMOL/L (ref 98–107)
CLARITY UR: ABNORMAL
CO2 SERPL-SCNC: 25 MMOL/L (ref 26–30)
COLOR UR: ABNORMAL
CREAT BLD-MCNC: 0.5 MG/DL (ref 0.6–1.3)
DEPRECATED RDW RBC AUTO: 60.6 FL (ref 37–54)
EOSINOPHIL # BLD AUTO: 0.13 10*3/MM3 (ref 0–0.7)
EOSINOPHIL NFR BLD AUTO: 2.4 % (ref 0–7)
ERYTHROCYTE [DISTWIDTH] IN BLOOD BY AUTOMATED COUNT: 19.9 % (ref 11.5–14.5)
GFR SERPL CREATININE-BSD FRML MDRD: 144 ML/MIN/1.73
GLOBULIN UR ELPH-MCNC: 3.7 GM/DL
GLUCOSE BLD-MCNC: 245 MG/DL (ref 74–98)
GLUCOSE UR STRIP-MCNC: NEGATIVE MG/DL
HCT VFR BLD AUTO: 35.1 % (ref 37–47)
HGB BLD-MCNC: 10.8 G/DL (ref 12–16)
HGB UR QL STRIP.AUTO: ABNORMAL
HOLD SPECIMEN: NORMAL
HOLD SPECIMEN: NORMAL
HYALINE CASTS UR QL AUTO: ABNORMAL /LPF
IMM GRANULOCYTES # BLD: 0.01 10*3/MM3 (ref 0–0.06)
IMM GRANULOCYTES NFR BLD: 0.2 % (ref 0–0.6)
INR PPP: 2.87 (ref 0.9–1.1)
KETONES UR QL STRIP: ABNORMAL
LEUKOCYTE ESTERASE UR QL STRIP.AUTO: NEGATIVE
LIPASE SERPL-CCNC: 83 U/L (ref 23–300)
LYMPHOCYTES # BLD AUTO: 1.87 10*3/MM3 (ref 0.6–3.4)
LYMPHOCYTES NFR BLD AUTO: 34.1 % (ref 10–50)
MCH RBC QN AUTO: 25.8 PG (ref 27–31)
MCHC RBC AUTO-ENTMCNC: 30.8 G/DL (ref 30–37)
MCV RBC AUTO: 84 FL (ref 81–99)
MONOCYTES # BLD AUTO: 0.5 10*3/MM3 (ref 0–0.9)
MONOCYTES NFR BLD AUTO: 9.1 % (ref 0–12)
NEUTROPHILS # BLD AUTO: 2.93 10*3/MM3 (ref 2–6.9)
NEUTROPHILS NFR BLD AUTO: 53.5 % (ref 37–80)
NITRITE UR QL STRIP: NEGATIVE
NRBC BLD MANUAL-RTO: 0 /100 WBC (ref 0–0)
PH UR STRIP.AUTO: <=5 [PH] (ref 5–8)
PLATELET # BLD AUTO: 262 10*3/MM3 (ref 130–400)
PMV BLD AUTO: 9.4 FL (ref 6–12)
POTASSIUM BLD-SCNC: 4.3 MMOL/L (ref 3.5–5.1)
PROT SERPL-MCNC: 8.3 G/DL (ref 6.3–8.2)
PROT UR QL STRIP: ABNORMAL
PROTHROMBIN TIME: 32.5 SECONDS (ref 9.3–12.1)
RBC # BLD AUTO: 4.18 10*6/MM3 (ref 4.2–5.4)
RBC # UR: ABNORMAL /HPF
REF LAB TEST METHOD: ABNORMAL
SODIUM BLD-SCNC: 142 MMOL/L (ref 137–145)
SP GR UR STRIP: >=1.03 (ref 1–1.03)
SQUAMOUS #/AREA URNS HPF: ABNORMAL /HPF
UROBILINOGEN UR QL STRIP: ABNORMAL
WBC NRBC COR # BLD: 5.48 10*3/MM3 (ref 4.8–10.8)
WBC UR QL AUTO: ABNORMAL /HPF
WHOLE BLOOD HOLD SPECIMEN: NORMAL
WHOLE BLOOD HOLD SPECIMEN: NORMAL

## 2018-01-21 PROCEDURE — 85025 COMPLETE CBC W/AUTO DIFF WBC: CPT | Performed by: STUDENT IN AN ORGANIZED HEALTH CARE EDUCATION/TRAINING PROGRAM

## 2018-01-21 PROCEDURE — 81001 URINALYSIS AUTO W/SCOPE: CPT | Performed by: STUDENT IN AN ORGANIZED HEALTH CARE EDUCATION/TRAINING PROGRAM

## 2018-01-21 PROCEDURE — 99284 EMERGENCY DEPT VISIT MOD MDM: CPT

## 2018-01-21 PROCEDURE — 83690 ASSAY OF LIPASE: CPT | Performed by: STUDENT IN AN ORGANIZED HEALTH CARE EDUCATION/TRAINING PROGRAM

## 2018-01-21 PROCEDURE — 96361 HYDRATE IV INFUSION ADD-ON: CPT

## 2018-01-21 PROCEDURE — 87086 URINE CULTURE/COLONY COUNT: CPT | Performed by: STUDENT IN AN ORGANIZED HEALTH CARE EDUCATION/TRAINING PROGRAM

## 2018-01-21 PROCEDURE — 81025 URINE PREGNANCY TEST: CPT | Performed by: STUDENT IN AN ORGANIZED HEALTH CARE EDUCATION/TRAINING PROGRAM

## 2018-01-21 PROCEDURE — 76830 TRANSVAGINAL US NON-OB: CPT

## 2018-01-21 PROCEDURE — 74176 CT ABD & PELVIS W/O CONTRAST: CPT

## 2018-01-21 PROCEDURE — 25010000002 HYDROMORPHONE PER 4 MG: Performed by: STUDENT IN AN ORGANIZED HEALTH CARE EDUCATION/TRAINING PROGRAM

## 2018-01-21 PROCEDURE — 96376 TX/PRO/DX INJ SAME DRUG ADON: CPT

## 2018-01-21 PROCEDURE — 25010000002 ONDANSETRON PER 1 MG: Performed by: STUDENT IN AN ORGANIZED HEALTH CARE EDUCATION/TRAINING PROGRAM

## 2018-01-21 PROCEDURE — 85610 PROTHROMBIN TIME: CPT | Performed by: STUDENT IN AN ORGANIZED HEALTH CARE EDUCATION/TRAINING PROGRAM

## 2018-01-21 PROCEDURE — 80053 COMPREHEN METABOLIC PANEL: CPT | Performed by: STUDENT IN AN ORGANIZED HEALTH CARE EDUCATION/TRAINING PROGRAM

## 2018-01-21 PROCEDURE — 96375 TX/PRO/DX INJ NEW DRUG ADDON: CPT

## 2018-01-21 PROCEDURE — 96374 THER/PROPH/DIAG INJ IV PUSH: CPT

## 2018-01-21 RX ORDER — ONDANSETRON 4 MG/1
4 TABLET, ORALLY DISINTEGRATING ORAL EVERY 6 HOURS PRN
Qty: 20 TABLET | Refills: 0 | Status: SHIPPED | OUTPATIENT
Start: 2018-01-21 | End: 2018-10-12

## 2018-01-21 RX ORDER — SODIUM CHLORIDE 0.9 % (FLUSH) 0.9 %
10 SYRINGE (ML) INJECTION AS NEEDED
Status: DISCONTINUED | OUTPATIENT
Start: 2018-01-21 | End: 2018-01-21 | Stop reason: HOSPADM

## 2018-01-21 RX ORDER — ONDANSETRON 2 MG/ML
4 INJECTION INTRAMUSCULAR; INTRAVENOUS ONCE
Status: COMPLETED | OUTPATIENT
Start: 2018-01-21 | End: 2018-01-21

## 2018-01-21 RX ORDER — OXYCODONE HYDROCHLORIDE AND ACETAMINOPHEN 5; 325 MG/1; MG/1
1 TABLET ORAL EVERY 4 HOURS PRN
Qty: 20 TABLET | Refills: 0 | Status: SHIPPED | OUTPATIENT
Start: 2018-01-21 | End: 2019-04-25

## 2018-01-21 RX ADMIN — HYDROMORPHONE HYDROCHLORIDE 1 MG: 1 INJECTION, SOLUTION INTRAMUSCULAR; INTRAVENOUS; SUBCUTANEOUS at 13:35

## 2018-01-21 RX ADMIN — HYDROMORPHONE HYDROCHLORIDE 1 MG: 1 INJECTION, SOLUTION INTRAMUSCULAR; INTRAVENOUS; SUBCUTANEOUS at 12:06

## 2018-01-21 RX ADMIN — ONDANSETRON 4 MG: 2 INJECTION INTRAMUSCULAR; INTRAVENOUS at 08:42

## 2018-01-21 RX ADMIN — LIDOCAINE HYDROCHLORIDE 150 MG: 10 INJECTION, SOLUTION INFILTRATION; PERINEURAL at 09:11

## 2018-01-21 RX ADMIN — SODIUM CHLORIDE 1000 ML: 9 INJECTION, SOLUTION INTRAVENOUS at 08:42

## 2018-01-21 RX ADMIN — HYDROMORPHONE HYDROCHLORIDE 1 MG: 1 INJECTION, SOLUTION INTRAMUSCULAR; INTRAVENOUS; SUBCUTANEOUS at 10:21

## 2018-01-21 NOTE — ED PROVIDER NOTES
Subjective   HPI Comments: Patient is a 32-year-old female that presents with right-sided abdominal pain that started last night.    She reports the pain is severe, constant, aching and nothing makes better or worse.  She states that she has a history of kidney stones as well as a history of ovarian cysts.  She states both of them felt similar to this.      Review of Systems   All other systems reviewed and are negative.      Past Medical History:   Diagnosis Date   • Depression    • Diabetes mellitus    • DVT (deep venous thrombosis)    • GERD (gastroesophageal reflux disease)    • Gout    • Hyperlipidemia    • Hypertension    • Hypothyroid    • Kidney stone    • Migraine    • Neuropathy    • PE (pulmonary embolism)        Allergies   Allergen Reactions   • Amoxicillin Hives   • Penicillins Hives   • Toradol [Ketorolac Tromethamine] Hives       Past Surgical History:   Procedure Laterality Date   • CARDIAC SURGERY      Heart Cath done in    •  SECTION     • CHOLECYSTECTOMY     • COLONOSCOPY         Family History   Problem Relation Age of Onset   • No Known Problems Mother    • No Known Problems Father    • No Known Problems Sister    • No Known Problems Brother    • No Known Problems Son    • No Known Problems Daughter    • No Known Problems Maternal Grandmother    • No Known Problems Maternal Grandfather    • No Known Problems Paternal Grandmother    • No Known Problems Paternal Grandfather    • No Known Problems Cousin    • Rheum arthritis Neg Hx    • Osteoarthritis Neg Hx    • Asthma Neg Hx    • Diabetes Neg Hx    • Heart failure Neg Hx    • Hyperlipidemia Neg Hx    • Hypertension Neg Hx    • Migraines Neg Hx    • Rashes / Skin problems Neg Hx    • Seizures Neg Hx    • Stroke Neg Hx    • Thyroid disease Neg Hx        Social History     Social History   • Marital status:      Spouse name: N/A   • Number of children: N/A   • Years of education: N/A     Social History Main Topics   • Smoking  status: Never Smoker   • Smokeless tobacco: Never Used   • Alcohol use No   • Drug use: No   • Sexual activity: Defer     Other Topics Concern   • None     Social History Narrative           Objective   Physical Exam   Nursing note and vitals reviewed.  GEN: Appears uncomfortable, nontoxic in appearance  Head: Normocephalic, atraumatic  Eyes: Pupils equal round reactive to light, lateral strabismus on the left eye  ENT: Posterior pharynx normal in appearance, oral mucosa is moist  Chest: Nontender to palpation  Cardiovascular: Regular rate  Lungs: Clear to auscultation bilaterally  Abdomen: Exam limited by morbid obesity, Soft, tender to palpation in the right lower quadrant region, no peritoneal signs  Neuro: GCS 15  Psych: Mood and affect are appropriate    Procedures         ED Course  ED Course                  MDM  Number of Diagnoses or Management Options  Hemorrhagic ovarian cyst:   Diagnosis management comments:      US Non-ob Transvaginal (Final result) Result time: 01/21/18 12:42:48    Final result by Jessica Manriquez MD (01/21/18 12:42:48)    Impression:    Complex cystic structure arising from the right ovary favored to  represent hemorrhagic cyst. Recommend 6-10 week followup  ultrasound.    Authenticated by Jessica Manriquez MD on 01/21/2018 12:42:48 PM    Narrative:    FINAL REPORT    CLINICAL HISTORY:  right ovarian mass on CT    FINDINGS:  Transvaginal images of the pelvis were obtained.    The uterus is normal in size.  There is a complex cystic structure associated with the right ovary measuring 2.5 cm favored to represent hemorrhagic cyst. Left ovary is unremarkable. Bilateral color flows identified. There is minimal pelvic free fluid.   Endometrium measures 2.3 mm.              CT Abdomen Pelvis Without Contrast (Final result) Result time: 01/21/18 10:39:03    Final result by Kailash Clark MD (01/21/18 10:39:03)    Impression:    No acute process.    Tiny nonobstructing left renal  stones.    Possible right ovarian solid mass. Recommend followup pelvic ultrasound on a nonemergent basis.    Authenticated by ALANNAH Clark MD on 01/21/2018 10:39:03 AM    Narrative:    FINAL REPORT    TECHNIQUE:  Axial images through the abdomen and pelvis were performed  without contrast.This study was performed with techniques to  keep radiation doses as low as reasonably achievable, (ALARA).  Individualized dose reduction techniques using automated  exposure control or adjustment of mA and/or kV according to the  patient's size were employed.    CLINICAL HISTORY:  Flank pain, stone disease suspected    COMPARISON:  No prior exam was submitted for comparison.    FINDINGS:  ABDOMEN: The lung bases are clear.  The heart size is normal.  Limited images of the liver demonstrate moderate hepatomegaly. Patient is status post cholecystectomy. The spleen is mildly enlarged.  No adrenal mass is identified.  The aorta is normal in   caliber.  There is no significant free fluid or adenopathy. There are tiny nonobstructing left renal stones. There is no hydronephrosis.    PELVIS: The appendix is normal. The urinary bladder is unremarkable.  There is no significant free fluid or adenopathy. The right ovary is enlarged, measuring 5.3 x 3.9 cm with increased density suspicious for solid mass. Uterus and left ovary are   normal.           Amount and/or Complexity of Data Reviewed  Clinical lab tests: ordered and reviewed  Decide to obtain previous medical records or to obtain history from someone other than the patient: yes  Obtain history from someone other than the patient: yes  Review and summarize past medical records: yes  Independent visualization of images, tracings, or specimens: yes        Final diagnoses:   Hemorrhagic ovarian cyst            Neo Bautista MD  01/21/18 7052

## 2018-01-21 NOTE — DISCHARGE INSTRUCTIONS
Ovarian Cyst   An ovarian cyst is a fluid-filled sac that forms on an ovary. The ovaries are small organs that produce eggs in women. Various types of cysts can form on the ovaries. Some may cause symptoms and require treatment. Most ovarian cysts go away on their own, are not cancerous (are benign), and do not cause problems.  Common types of ovarian cysts include:  · Functional (follicle) cysts.  ¨ Occur during the menstrual cycle, and usually go away with the next menstrual cycle if you do not get pregnant.  ¨ Usually cause no symptoms.  · Endometriomas.  ¨ Are cysts that form from the tissue that lines the uterus (endometrium).  ¨ Are sometimes called “chocolate cysts” because they become filled with blood that turns brown.  ¨ Can cause pain in the lower abdomen during intercourse and during your period.  · Cystadenoma cysts.  ¨ Develop from cells on the outside surface of the ovary.  ¨ Can get very large and cause lower abdomen pain and pain with intercourse.  ¨ Can cause severe pain if they twist or break open (rupture).  · Dermoid cysts.  ¨ Are sometimes found in both ovaries.  ¨ May contain different kinds of body tissue, such as skin, teeth, hair, or cartilage.  ¨ Usually do not cause symptoms unless they get very big.  · Theca lutein cysts.  ¨ Occur when too much of a certain hormone (human chorionic gonadotropin) is produced and overstimulates the ovaries to produce an egg.  ¨ Are most common after having procedures used to assist with the conception of a baby (in vitro fertilization).  What are the causes?  Ovarian cysts may be caused by:  · Ovarian hyperstimulation syndrome. This is a condition that can develop from taking fertility medicines. It causes multiple large ovarian cysts to form.  · Polycystic ovarian syndrome (PCOS). This is a common hormonal disorder that can cause ovarian cysts, as well as problems with your period or fertility.  What increases the risk?  The following factors may make you  more likely to develop ovarian cysts:  · Being overweight or obese.  · Taking fertility medicines.  · Taking certain forms of hormonal birth control.  · Smoking.  What are the signs or symptoms?  Many ovarian cysts do not cause symptoms. If symptoms are present, they may include:  · Pelvic pain or pressure.  · Pain in the lower abdomen.  · Pain during sex.  · Abdominal swelling.  · Abnormal menstrual periods.  · Increasing pain with menstrual periods.  How is this diagnosed?  These cysts are commonly found during a routine pelvic exam. You may have tests to find out more about the cyst, such as:  · Ultrasound.  · X-ray of the pelvis.  · CT scan.  · MRI.  · Blood tests.  How is this treated?  Many ovarian cysts go away on their own without treatment. Your health care provider may want to check your cyst regularly for 2-3 months to see if it changes. If you are in menopause, it is especially important to have your cyst monitored closely because menopausal women have a higher rate of ovarian cancer.  When treatment is needed, it may include:  · Medicines to help relieve pain.  · A procedure to drain the cyst (aspiration).  · Surgery to remove the whole cyst.  · Hormone treatment or birth control pills. These methods are sometimes used to help dissolve a cyst.  Follow these instructions at home:  · Take over-the-counter and prescription medicines only as told by your health care provider.  · Do not drive or use heavy machinery while taking prescription pain medicine.  · Get regular pelvic exams and Pap tests as often as told by your health care provider.  · Return to your normal activities as told by your health care provider. Ask your health care provider what activities are safe for you.  · Do not use any products that contain nicotine or tobacco, such as cigarettes and e-cigarettes. If you need help quitting, ask your health care provider.  · Keep all follow-up visits as told by your health care provider. This is  important.  Contact a health care provider if:  · Your periods are late, irregular, or painful, or they stop.  · You have pelvic pain that does not go away.  · You have pressure on your bladder or trouble emptying your bladder completely.  · You have pain during sex.  · You have any of the following in your abdomen:  ¨ A feeling of fullness.  ¨ Pressure.  ¨ Discomfort.  ¨ Pain that does not go away.  ¨ Swelling.  · You feel generally ill.  · You become constipated.  · You lose your appetite.  · You develop severe acne.  · You start to have more body hair and facial hair.  · You are gaining weight or losing weight without changing your exercise and eating habits.  · You think you may be pregnant.  Get help right away if:  · You have abdominal pain that is severe or gets worse.  · You cannot eat or drink without vomiting.  · You suddenly develop a fever.  · Your menstrual period is much heavier than usual.  This information is not intended to replace advice given to you by your health care provider. Make sure you discuss any questions you have with your health care provider.  Document Released: 12/18/2006 Document Revised: 07/07/2017 Document Reviewed: 05/21/2017  [employer] Interactive Patient Education © 2017 [employer]

## 2018-01-23 LAB — BACTERIA SPEC AEROBE CULT: NORMAL

## 2018-02-18 ENCOUNTER — APPOINTMENT (OUTPATIENT)
Dept: CT IMAGING | Facility: HOSPITAL | Age: 33
End: 2018-02-18

## 2018-02-18 ENCOUNTER — HOSPITAL ENCOUNTER (EMERGENCY)
Facility: HOSPITAL | Age: 33
Discharge: HOME | End: 2018-02-18
Attending: EMERGENCY MEDICINE | Admitting: EMERGENCY MEDICINE

## 2018-02-18 VITALS
SYSTOLIC BLOOD PRESSURE: 145 MMHG | DIASTOLIC BLOOD PRESSURE: 98 MMHG | TEMPERATURE: 98.1 F | OXYGEN SATURATION: 94 % | BODY MASS INDEX: 50.02 KG/M2 | HEIGHT: 64 IN | RESPIRATION RATE: 18 BRPM | WEIGHT: 293 LBS | HEART RATE: 102 BPM

## 2018-02-18 DIAGNOSIS — N83.201 CYST OF RIGHT OVARY: ICD-10-CM

## 2018-02-18 DIAGNOSIS — R10.9 FLANK PAIN: Primary | ICD-10-CM

## 2018-02-18 LAB
ALBUMIN SERPL-MCNC: 4.3 G/DL (ref 3.5–5)
ALBUMIN/GLOB SERPL: 1.2 G/DL (ref 1–2)
ALP SERPL-CCNC: 83 U/L (ref 38–126)
ALT SERPL W P-5'-P-CCNC: 72 U/L (ref 13–69)
ANION GAP SERPL CALCULATED.3IONS-SCNC: 22.9 MMOL/L
AST SERPL-CCNC: 149 U/L (ref 15–46)
B-HCG UR QL: NEGATIVE
BACTERIA UR QL AUTO: ABNORMAL /HPF
BASOPHILS # BLD AUTO: 0.04 10*3/MM3 (ref 0–0.2)
BASOPHILS NFR BLD AUTO: 0.8 % (ref 0–2.5)
BILIRUB SERPL-MCNC: 0.4 MG/DL (ref 0.2–1.3)
BILIRUB UR QL STRIP: ABNORMAL
BUN BLD-MCNC: 11 MG/DL (ref 7–20)
BUN/CREAT SERPL: 27.5 (ref 7.1–23.5)
CALCIUM SPEC-SCNC: 9.6 MG/DL (ref 8.4–10.2)
CHLORIDE SERPL-SCNC: 102 MMOL/L (ref 98–107)
CLARITY UR: ABNORMAL
CO2 SERPL-SCNC: 23 MMOL/L (ref 26–30)
COLOR UR: ABNORMAL
CREAT BLD-MCNC: 0.4 MG/DL (ref 0.6–1.3)
DEPRECATED RDW RBC AUTO: 55.4 FL (ref 37–54)
EOSINOPHIL # BLD AUTO: 0.09 10*3/MM3 (ref 0–0.7)
EOSINOPHIL NFR BLD AUTO: 1.7 % (ref 0–7)
ERYTHROCYTE [DISTWIDTH] IN BLOOD BY AUTOMATED COUNT: 17.9 % (ref 11.5–14.5)
GFR SERPL CREATININE-BSD FRML MDRD: >150 ML/MIN/1.73
GLOBULIN UR ELPH-MCNC: 3.7 GM/DL
GLUCOSE BLD-MCNC: 303 MG/DL (ref 74–98)
GLUCOSE UR STRIP-MCNC: ABNORMAL MG/DL
HCT VFR BLD AUTO: 35 % (ref 37–47)
HGB BLD-MCNC: 11.5 G/DL (ref 12–16)
HGB UR QL STRIP.AUTO: ABNORMAL
HYALINE CASTS UR QL AUTO: ABNORMAL /LPF
IMM GRANULOCYTES # BLD: 0.01 10*3/MM3 (ref 0–0.06)
IMM GRANULOCYTES NFR BLD: 0.2 % (ref 0–0.6)
INR PPP: 2.5 (ref 0.9–1.1)
KETONES UR QL STRIP: ABNORMAL
LEUKOCYTE ESTERASE UR QL STRIP.AUTO: NEGATIVE
LIPASE SERPL-CCNC: 73 U/L (ref 23–300)
LYMPHOCYTES # BLD AUTO: 1.75 10*3/MM3 (ref 0.6–3.4)
LYMPHOCYTES NFR BLD AUTO: 33.4 % (ref 10–50)
MCH RBC QN AUTO: 28.5 PG (ref 27–31)
MCHC RBC AUTO-ENTMCNC: 32.9 G/DL (ref 30–37)
MCV RBC AUTO: 86.6 FL (ref 81–99)
MONOCYTES # BLD AUTO: 0.49 10*3/MM3 (ref 0–0.9)
MONOCYTES NFR BLD AUTO: 9.4 % (ref 0–12)
NEUTROPHILS # BLD AUTO: 2.86 10*3/MM3 (ref 2–6.9)
NEUTROPHILS NFR BLD AUTO: 54.5 % (ref 37–80)
NITRITE UR QL STRIP: NEGATIVE
NRBC BLD MANUAL-RTO: 0 /100 WBC (ref 0–0)
PH UR STRIP.AUTO: <=5 [PH] (ref 5–8)
PLATELET # BLD AUTO: 234 10*3/MM3 (ref 130–400)
PMV BLD AUTO: 9.2 FL (ref 6–12)
POTASSIUM BLD-SCNC: 3.9 MMOL/L (ref 3.5–5.1)
PROT SERPL-MCNC: 8 G/DL (ref 6.3–8.2)
PROT UR QL STRIP: ABNORMAL
PROTHROMBIN TIME: 28.3 SECONDS (ref 9.3–12.1)
RBC # BLD AUTO: 4.04 10*6/MM3 (ref 4.2–5.4)
RBC # UR: ABNORMAL /HPF
REF LAB TEST METHOD: ABNORMAL
SODIUM BLD-SCNC: 144 MMOL/L (ref 137–145)
SP GR UR STRIP: >=1.03 (ref 1–1.03)
SQUAMOUS #/AREA URNS HPF: ABNORMAL /HPF
UROBILINOGEN UR QL STRIP: ABNORMAL
WBC NRBC COR # BLD: 5.24 10*3/MM3 (ref 4.8–10.8)
WBC UR QL AUTO: ABNORMAL /HPF

## 2018-02-18 PROCEDURE — 96375 TX/PRO/DX INJ NEW DRUG ADDON: CPT

## 2018-02-18 PROCEDURE — 87086 URINE CULTURE/COLONY COUNT: CPT | Performed by: EMERGENCY MEDICINE

## 2018-02-18 PROCEDURE — 81001 URINALYSIS AUTO W/SCOPE: CPT | Performed by: EMERGENCY MEDICINE

## 2018-02-18 PROCEDURE — 85610 PROTHROMBIN TIME: CPT | Performed by: EMERGENCY MEDICINE

## 2018-02-18 PROCEDURE — 85025 COMPLETE CBC W/AUTO DIFF WBC: CPT | Performed by: EMERGENCY MEDICINE

## 2018-02-18 PROCEDURE — 81025 URINE PREGNANCY TEST: CPT | Performed by: EMERGENCY MEDICINE

## 2018-02-18 PROCEDURE — 74176 CT ABD & PELVIS W/O CONTRAST: CPT

## 2018-02-18 PROCEDURE — 25010000002 ONDANSETRON PER 1 MG: Performed by: EMERGENCY MEDICINE

## 2018-02-18 PROCEDURE — 80053 COMPREHEN METABOLIC PANEL: CPT | Performed by: EMERGENCY MEDICINE

## 2018-02-18 PROCEDURE — 99284 EMERGENCY DEPT VISIT MOD MDM: CPT

## 2018-02-18 PROCEDURE — 96374 THER/PROPH/DIAG INJ IV PUSH: CPT

## 2018-02-18 PROCEDURE — 83690 ASSAY OF LIPASE: CPT | Performed by: EMERGENCY MEDICINE

## 2018-02-18 PROCEDURE — 25010000002 MORPHINE PER 10 MG: Performed by: EMERGENCY MEDICINE

## 2018-02-18 RX ORDER — MORPHINE SULFATE 4 MG/ML
8 INJECTION, SOLUTION INTRAMUSCULAR; INTRAVENOUS ONCE
Status: COMPLETED | OUTPATIENT
Start: 2018-02-18 | End: 2018-02-18

## 2018-02-18 RX ORDER — HYDROCODONE BITARTRATE AND ACETAMINOPHEN 5; 325 MG/1; MG/1
1 TABLET ORAL ONCE
Status: COMPLETED | OUTPATIENT
Start: 2018-02-18 | End: 2018-02-18

## 2018-02-18 RX ORDER — ONDANSETRON 2 MG/ML
4 INJECTION INTRAMUSCULAR; INTRAVENOUS ONCE
Status: COMPLETED | OUTPATIENT
Start: 2018-02-18 | End: 2018-02-18

## 2018-02-18 RX ADMIN — ONDANSETRON 4 MG: 2 INJECTION INTRAMUSCULAR; INTRAVENOUS at 04:28

## 2018-02-18 RX ADMIN — MORPHINE SULFATE 8 MG: 4 INJECTION, SOLUTION INTRAMUSCULAR; INTRAVENOUS at 04:25

## 2018-02-18 RX ADMIN — HYDROCODONE BITARTRATE AND ACETAMINOPHEN 1 TABLET: 5; 325 TABLET ORAL at 05:30

## 2018-02-18 RX ADMIN — SODIUM CHLORIDE 1000 ML: 9 INJECTION, SOLUTION INTRAVENOUS at 04:24

## 2018-02-18 NOTE — ED PROVIDER NOTES
Subjective   HPI Comments: 32-year-old female presenting with right flank pain.  She states that for the last 24 hours she's had severe right flank pain, sharp, radiates to right lower quadrant, no alleviating or aggravating factors.  Has been associated with nausea, vomiting and hematuria.  Feels similar to her usual kidney stones.  She denies any fevers, chills, diarrhea, vaginal bleeding/discharge.      Review of Systems   Constitutional: Negative for chills and fever.   HENT: Negative for congestion, rhinorrhea and sore throat.    Eyes: Negative for pain.   Respiratory: Negative for cough and shortness of breath.    Cardiovascular: Negative for chest pain, palpitations and leg swelling.   Gastrointestinal: Positive for abdominal pain, nausea and vomiting. Negative for diarrhea.   Genitourinary: Positive for flank pain and hematuria. Negative for dysuria.   Musculoskeletal: Negative for arthralgias.   Skin: Negative for rash.   Neurological: Negative for weakness and numbness.   Psychiatric/Behavioral: Negative for behavioral problems.       Past Medical History:   Diagnosis Date   • Depression    • Diabetes mellitus    • DVT (deep venous thrombosis)    • GERD (gastroesophageal reflux disease)    • Gout    • Hyperlipidemia    • Hypertension    • Hypothyroid    • Kidney stone    • Migraine    • Neuropathy    • PE (pulmonary embolism)        Allergies   Allergen Reactions   • Amoxicillin Hives   • Penicillins Hives   • Toradol [Ketorolac Tromethamine] Hives       Past Surgical History:   Procedure Laterality Date   • CARDIAC SURGERY      Heart Cath done in    •  SECTION     • CHOLECYSTECTOMY     • COLONOSCOPY         Family History   Problem Relation Age of Onset   • No Known Problems Mother    • No Known Problems Father    • No Known Problems Sister    • No Known Problems Brother    • No Known Problems Son    • No Known Problems Daughter    • No Known Problems Maternal Grandmother    • No Known  Problems Maternal Grandfather    • No Known Problems Paternal Grandmother    • No Known Problems Paternal Grandfather    • No Known Problems Cousin    • Rheum arthritis Neg Hx    • Osteoarthritis Neg Hx    • Asthma Neg Hx    • Diabetes Neg Hx    • Heart failure Neg Hx    • Hyperlipidemia Neg Hx    • Hypertension Neg Hx    • Migraines Neg Hx    • Rashes / Skin problems Neg Hx    • Seizures Neg Hx    • Stroke Neg Hx    • Thyroid disease Neg Hx        Social History     Social History   • Marital status:      Spouse name: N/A   • Number of children: N/A   • Years of education: N/A     Social History Main Topics   • Smoking status: Never Smoker   • Smokeless tobacco: Never Used   • Alcohol use No   • Drug use: No   • Sexual activity: Defer     Other Topics Concern   • None     Social History Narrative           Objective   Physical Exam   Constitutional: She is oriented to person, place, and time. She appears well-developed and well-nourished. No distress.   HENT:   Head: Normocephalic and atraumatic.   Right Ear: External ear normal.   Left Ear: External ear normal.   Nose: Nose normal.   Mouth/Throat: Oropharynx is clear and moist.   Eyes: Conjunctivae and EOM are normal. Pupils are equal, round, and reactive to light.   Neck: Normal range of motion. Neck supple.   Cardiovascular: Regular rhythm, normal heart sounds and intact distal pulses.    Mild tachycardia   Pulmonary/Chest: Effort normal and breath sounds normal. No respiratory distress.   Abdominal: Soft. Bowel sounds are normal. She exhibits no distension. There is no rebound and no guarding.   Very difficult exam due to habitus, mild right lower quadrant tenderness   Musculoskeletal: Normal range of motion. She exhibits no edema, tenderness or deformity.   No CVA tenderness   Neurological: She is alert and oriented to person, place, and time.   Skin: Skin is warm and dry. No rash noted.   Psychiatric: She has a normal mood and affect. Her behavior is  normal.   Nursing note and vitals reviewed.      Procedures         ED Course  ED Course                  MDM  Number of Diagnoses or Management Options  Cyst of right ovary:   Flank pain:   Diagnosis management comments: 32-year-old female with flank pain.  Well-developed, well-nourished morbidly obese female in no distress with normal vital signs other than mild tachycardia and exam as above.  We'll check labs, CT scan, urinalysis.  We'll treat symptomatically.  Disposition pending workup.    DDX: Renal colic, UTI, chronic pain    Labwork without acute or significant abnormality.  UA has blood but no signs of infection.  CT scan shows known right ovarian cyst, no other acute findings.  We'll discharge home with gynecology follow-up.      Final diagnoses:   Flank pain   Cyst of right ovary            Ted Kruse MD  02/18/18 0788

## 2018-02-18 NOTE — DISCHARGE INSTRUCTIONS
Flank Pain  Flank pain is pain in your side. The flank is the area of your side between your upper belly (abdomen) and your back. The pain may occur over a short period of time (acute) or may be long-term or come back often (chronic). It may be mild or very bad. Pain in this area can be caused by many different things.  Follow these instructions at home:  · Rest as told by your doctor.  · Drink enough fluid to keep your pee (urine) clear or pale yellow.  · Take over-the-counter and prescription medicines only as told by your doctor.  · Keep all follow-up visits as told by your doctor. This is important.  Contact a doctor if:  · Medicine does not help your pain.  · You have new symptoms.  · Your pain gets worse.  · You have a fever.  · Your symptoms last longer than 2-3 days.  Get help right away if:  · Your tummy hurts or is swollen.  · You are short of breath.  · You feel sick to your stomach (nauseous) and it does not go away.  · You cannot stop throwing up (vomiting).  · You feel like you will pass out or you do pass out (faint).  · You have blood in your pee.  · You have a fever and your symptoms suddenly get worse.  This information is not intended to replace advice given to you by your health care provider. Make sure you discuss any questions you have with your health care provider.  Document Released: 09/26/2009 Document Revised: 09/08/2017 Document Reviewed: 09/20/2016  [employer] Patient Education © 2017 [employer]      Ovarian Cyst  An ovarian cyst is a fluid-filled sac on an ovary. The ovaries are organs that make eggs in women. Most ovarian cysts go away on their own and are not cancerous (are benign). Some cysts need treatment.  Follow these instructions at home:  · Take over-the-counter and prescription medicines only as told by your doctor.  · Do not drive or use heavy machinery while taking prescription pain medicine.  · Get pelvic exams and Pap tests as often as told by your  doctor.  · Return to your normal activities as told by your doctor. Ask your doctor what activities are safe for you.  · Do not use any products that contain nicotine or tobacco, such as cigarettes and e-cigarettes. If you need help quitting, ask your doctor.  · Keep all follow-up visits as told by your doctor. This is important.  Contact a doctor if:  · Your periods are:  ¨ Late.  ¨ Irregular.  ¨ Painful.  · Your periods stop.  · You have pelvic pain that does not go away.  · You have pressure on your bladder.  · You have trouble making your bladder empty when you pee (urinate).  · You have pain during sex.  · You have any of the following in your belly (abdomen):  ¨ A feeling of fullness.  ¨ Pressure.  ¨ Discomfort.  ¨ Pain that does not go away.  ¨ Swelling.  · You feel sick most of the time.  · You have trouble pooping (have constipation).  · You are not as hungry as usual (you lose your appetite).  · You get very bad acne.  · You start to have more hair on your body and face.  · You are gaining weight or losing weight without changing your exercise and eating habits.  · You think you may be pregnant.  Get help right away if:  · You have belly pain that is very bad or gets worse.  · You cannot eat or drink without throwing up (vomiting).  · You suddenly get a fever.  · Your period is a lot heavier than usual.  This information is not intended to replace advice given to you by your health care provider. Make sure you discuss any questions you have with your health care provider.  Document Released: 06/05/2009 Document Revised: 07/07/2017 Document Reviewed: 05/21/2017  [employer] Interactive Patient Education © 2017 [employer]      Hypertension  Hypertension, commonly called high blood pressure, is when the force of blood pumping through the arteries is too strong. The arteries are the blood vessels that carry blood from the heart throughout the body. Hypertension forces the heart to work harder to pump blood and  "may cause arteries to become narrow or stiff. Having untreated or uncontrolled hypertension can cause heart attacks, strokes, kidney disease, and other problems.  A blood pressure reading consists of a higher number over a lower number. Ideally, your blood pressure should be below 120/80. The first (\"top\") number is called the systolic pressure. It is a measure of the pressure in your arteries as your heart beats. The second (\"bottom\") number is called the diastolic pressure. It is a measure of the pressure in your arteries as the heart relaxes.  What are the causes?  The cause of this condition is not known.  What increases the risk?  Some risk factors for high blood pressure are under your control. Others are not.  Factors you can change   · Smoking.  · Having type 2 diabetes mellitus, high cholesterol, or both.  · Not getting enough exercise or physical activity.  · Being overweight.  · Having too much fat, sugar, calories, or salt (sodium) in your diet.  · Drinking too much alcohol.  Factors that are difficult or impossible to change   · Having chronic kidney disease.  · Having a family history of high blood pressure.  · Age. Risk increases with age.  · Race. You may be at higher risk if you are -American.  · Gender. Men are at higher risk than women before age 45. After age 65, women are at higher risk than men.  · Having obstructive sleep apnea.  · Stress.  What are the signs or symptoms?  Extremely high blood pressure (hypertensive crisis) may cause:  · Headache.  · Anxiety.  · Shortness of breath.  · Nosebleed.  · Nausea and vomiting.  · Severe chest pain.  · Jerky movements you cannot control (seizures).  How is this diagnosed?  This condition is diagnosed by measuring your blood pressure while you are seated, with your arm resting on a surface. The cuff of the blood pressure monitor will be placed directly against the skin of your upper arm at the level of your heart. It should be measured at least " twice using the same arm. Certain conditions can cause a difference in blood pressure between your right and left arms.  Certain factors can cause blood pressure readings to be lower or higher than normal (elevated) for a short period of time:  · When your blood pressure is higher when you are in a health care provider's office than when you are at home, this is called white coat hypertension. Most people with this condition do not need medicines.  · When your blood pressure is higher at home than when you are in a health care provider's office, this is called masked hypertension. Most people with this condition may need medicines to control blood pressure.  If you have a high blood pressure reading during one visit or you have normal blood pressure with other risk factors:  · You may be asked to return on a different day to have your blood pressure checked again.  · You may be asked to monitor your blood pressure at home for 1 week or longer.  If you are diagnosed with hypertension, you may have other blood or imaging tests to help your health care provider understand your overall risk for other conditions.  How is this treated?  This condition is treated by making healthy lifestyle changes, such as eating healthy foods, exercising more, and reducing your alcohol intake. Your health care provider may prescribe medicine if lifestyle changes are not enough to get your blood pressure under control, and if:  · Your systolic blood pressure is above 130.  · Your diastolic blood pressure is above 80.  Your personal target blood pressure may vary depending on your medical conditions, your age, and other factors.  Follow these instructions at home:  Eating and drinking   · Eat a diet that is high in fiber and potassium, and low in sodium, added sugar, and fat. An example eating plan is called the DASH (Dietary Approaches to Stop Hypertension) diet. To eat this way:  ¨ Eat plenty of fresh fruits and vegetables. Try to fill  half of your plate at each meal with fruits and vegetables.  ¨ Eat whole grains, such as whole wheat pasta, brown rice, or whole grain bread. Fill about one quarter of your plate with whole grains.  ¨ Eat or drink low-fat dairy products, such as skim milk or low-fat yogurt.  ¨ Avoid fatty cuts of meat, processed or cured meats, and poultry with skin. Fill about one quarter of your plate with lean proteins, such as fish, chicken without skin, beans, eggs, and tofu.  ¨ Avoid premade and processed foods. These tend to be higher in sodium, added sugar, and fat.  · Reduce your daily sodium intake. Most people with hypertension should eat less than 1,500 mg of sodium a day.  · Limit alcohol intake to no more than 1 drink a day for nonpregnant women and 2 drinks a day for men. One drink equals 12 oz of beer, 5 oz of wine, or 1½ oz of hard liquor.  Lifestyle   · Work with your health care provider to maintain a healthy body weight or to lose weight. Ask what an ideal weight is for you.  · Get at least 30 minutes of exercise that causes your heart to beat faster (aerobic exercise) most days of the week. Activities may include walking, swimming, or biking.  · Include exercise to strengthen your muscles (resistance exercise), such as pilates or lifting weights, as part of your weekly exercise routine. Try to do these types of exercises for 30 minutes at least 3 days a week.  · Do not use any products that contain nicotine or tobacco, such as cigarettes and e-cigarettes. If you need help quitting, ask your health care provider.  · Monitor your blood pressure at home as told by your health care provider.  · Keep all follow-up visits as told by your health care provider. This is important.  Medicines   · Take over-the-counter and prescription medicines only as told by your health care provider. Follow directions carefully. Blood pressure medicines must be taken as prescribed.  · Do not skip doses of blood pressure medicine. Doing  this puts you at risk for problems and can make the medicine less effective.  · Ask your health care provider about side effects or reactions to medicines that you should watch for.  Contact a health care provider if:  · You think you are having a reaction to a medicine you are taking.  · You have headaches that keep coming back (recurring).  · You feel dizzy.  · You have swelling in your ankles.  · You have trouble with your vision.  Get help right away if:  · You develop a severe headache or confusion.  · You have unusual weakness or numbness.  · You feel faint.  · You have severe pain in your chest or abdomen.  · You vomit repeatedly.  · You have trouble breathing.  Summary  · Hypertension is when the force of blood pumping through your arteries is too strong. If this condition is not controlled, it may put you at risk for serious complications.  · Your personal target blood pressure may vary depending on your medical conditions, your age, and other factors. For most people, a normal blood pressure is less than 120/80.  · Hypertension is treated with lifestyle changes, medicines, or a combination of both. Lifestyle changes include weight loss, eating a healthy, low-sodium diet, exercising more, and limiting alcohol.  This information is not intended to replace advice given to you by your health care provider. Make sure you discuss any questions you have with your health care provider.  Document Released: 12/18/2006 Document Revised: 11/15/2017 Document Reviewed: 11/15/2017  [employer] Interactive Patient Education © 2017 [employer]

## 2018-02-19 LAB — BACTERIA SPEC AEROBE CULT: NORMAL

## 2018-02-23 ENCOUNTER — HOSPITAL ENCOUNTER (EMERGENCY)
Facility: HOSPITAL | Age: 33
Discharge: HOME | End: 2018-02-23
Attending: EMERGENCY MEDICINE | Admitting: EMERGENCY MEDICINE

## 2018-02-23 ENCOUNTER — APPOINTMENT (OUTPATIENT)
Dept: ULTRASOUND IMAGING | Facility: HOSPITAL | Age: 33
End: 2018-02-23

## 2018-02-23 VITALS
BODY MASS INDEX: 50.02 KG/M2 | HEART RATE: 84 BPM | WEIGHT: 293 LBS | DIASTOLIC BLOOD PRESSURE: 81 MMHG | RESPIRATION RATE: 20 BRPM | TEMPERATURE: 98.5 F | OXYGEN SATURATION: 96 % | SYSTOLIC BLOOD PRESSURE: 140 MMHG | HEIGHT: 64 IN

## 2018-02-23 DIAGNOSIS — R10.9 RIGHT FLANK PAIN: ICD-10-CM

## 2018-02-23 DIAGNOSIS — Z87.442 HISTORY OF KIDNEY STONES: ICD-10-CM

## 2018-02-23 DIAGNOSIS — R79.1 SUBTHERAPEUTIC INTERNATIONAL NORMALIZED RATIO (INR): ICD-10-CM

## 2018-02-23 DIAGNOSIS — R31.9 HEMATURIA, UNSPECIFIED TYPE: Primary | ICD-10-CM

## 2018-02-23 LAB
ALBUMIN SERPL-MCNC: 4.3 G/DL (ref 3.5–5)
ALBUMIN/GLOB SERPL: 1.3 G/DL (ref 1–2)
ALP SERPL-CCNC: 75 U/L (ref 38–126)
ALT SERPL W P-5'-P-CCNC: 66 U/L (ref 13–69)
ANION GAP SERPL CALCULATED.3IONS-SCNC: 21.4 MMOL/L
AST SERPL-CCNC: 156 U/L (ref 15–46)
B-HCG UR QL: NEGATIVE
BACTERIA UR QL AUTO: ABNORMAL /HPF
BASOPHILS # BLD AUTO: 0.02 10*3/MM3 (ref 0–0.2)
BASOPHILS NFR BLD AUTO: 0.5 % (ref 0–2.5)
BILIRUB SERPL-MCNC: 0.6 MG/DL (ref 0.2–1.3)
BILIRUB UR QL STRIP: NEGATIVE
BUN BLD-MCNC: 7 MG/DL (ref 7–20)
BUN/CREAT SERPL: 17.5 (ref 7.1–23.5)
CALCIUM SPEC-SCNC: 9.3 MG/DL (ref 8.4–10.2)
CHLORIDE SERPL-SCNC: 101 MMOL/L (ref 98–107)
CLARITY UR: ABNORMAL
CO2 SERPL-SCNC: 25 MMOL/L (ref 26–30)
COLOR UR: ABNORMAL
CREAT BLD-MCNC: 0.4 MG/DL (ref 0.6–1.3)
DEPRECATED RDW RBC AUTO: 52.8 FL (ref 37–54)
EOSINOPHIL # BLD AUTO: 0.09 10*3/MM3 (ref 0–0.7)
EOSINOPHIL NFR BLD AUTO: 2.1 % (ref 0–7)
ERYTHROCYTE [DISTWIDTH] IN BLOOD BY AUTOMATED COUNT: 16.8 % (ref 11.5–14.5)
GFR SERPL CREATININE-BSD FRML MDRD: >150 ML/MIN/1.73
GLOBULIN UR ELPH-MCNC: 3.4 GM/DL
GLUCOSE BLD-MCNC: 290 MG/DL (ref 74–98)
GLUCOSE UR STRIP-MCNC: ABNORMAL MG/DL
HCT VFR BLD AUTO: 32.9 % (ref 37–47)
HGB BLD-MCNC: 10.8 G/DL (ref 12–16)
HGB UR QL STRIP.AUTO: ABNORMAL
HYALINE CASTS UR QL AUTO: ABNORMAL /LPF
IMM GRANULOCYTES # BLD: 0.03 10*3/MM3 (ref 0–0.06)
IMM GRANULOCYTES NFR BLD: 0.7 % (ref 0–0.6)
INR PPP: 1.38 (ref 0.9–1.1)
KETONES UR QL STRIP: ABNORMAL
LEUKOCYTE ESTERASE UR QL STRIP.AUTO: NEGATIVE
LIPASE SERPL-CCNC: 154 U/L (ref 23–300)
LYMPHOCYTES # BLD AUTO: 1.63 10*3/MM3 (ref 0.6–3.4)
LYMPHOCYTES NFR BLD AUTO: 38.3 % (ref 10–50)
MCH RBC QN AUTO: 28.3 PG (ref 27–31)
MCHC RBC AUTO-ENTMCNC: 32.8 G/DL (ref 30–37)
MCV RBC AUTO: 86.1 FL (ref 81–99)
MONOCYTES # BLD AUTO: 0.35 10*3/MM3 (ref 0–0.9)
MONOCYTES NFR BLD AUTO: 8.2 % (ref 0–12)
NEUTROPHILS # BLD AUTO: 2.14 10*3/MM3 (ref 2–6.9)
NEUTROPHILS NFR BLD AUTO: 50.2 % (ref 37–80)
NITRITE UR QL STRIP: NEGATIVE
NRBC BLD MANUAL-RTO: 0 /100 WBC (ref 0–0)
PH UR STRIP.AUTO: 7.5 [PH] (ref 5–8)
PLATELET # BLD AUTO: 227 10*3/MM3 (ref 130–400)
PMV BLD AUTO: 9.2 FL (ref 6–12)
POTASSIUM BLD-SCNC: 4.4 MMOL/L (ref 3.5–5.1)
PROT SERPL-MCNC: 7.7 G/DL (ref 6.3–8.2)
PROT UR QL STRIP: ABNORMAL
PROTHROMBIN TIME: 15.5 SECONDS (ref 9.3–12.1)
RBC # BLD AUTO: 3.82 10*6/MM3 (ref 4.2–5.4)
RBC # UR: ABNORMAL /HPF
REF LAB TEST METHOD: ABNORMAL
SODIUM BLD-SCNC: 143 MMOL/L (ref 137–145)
SP GR UR STRIP: 1.02 (ref 1–1.03)
SQUAMOUS #/AREA URNS HPF: ABNORMAL /HPF
UROBILINOGEN UR QL STRIP: ABNORMAL
WBC NRBC COR # BLD: 4.26 10*3/MM3 (ref 4.8–10.8)
WBC UR QL AUTO: ABNORMAL /HPF

## 2018-02-23 PROCEDURE — 99284 EMERGENCY DEPT VISIT MOD MDM: CPT

## 2018-02-23 PROCEDURE — 96374 THER/PROPH/DIAG INJ IV PUSH: CPT

## 2018-02-23 PROCEDURE — 80053 COMPREHEN METABOLIC PANEL: CPT | Performed by: NURSE PRACTITIONER

## 2018-02-23 PROCEDURE — 25010000002 ONDANSETRON PER 1 MG: Performed by: NURSE PRACTITIONER

## 2018-02-23 PROCEDURE — 83690 ASSAY OF LIPASE: CPT | Performed by: NURSE PRACTITIONER

## 2018-02-23 PROCEDURE — 85610 PROTHROMBIN TIME: CPT | Performed by: NURSE PRACTITIONER

## 2018-02-23 PROCEDURE — 81025 URINE PREGNANCY TEST: CPT | Performed by: NURSE PRACTITIONER

## 2018-02-23 PROCEDURE — 96375 TX/PRO/DX INJ NEW DRUG ADDON: CPT

## 2018-02-23 PROCEDURE — 25010000002 FENTANYL CITRATE (PF) 100 MCG/2ML SOLUTION: Performed by: NURSE PRACTITIONER

## 2018-02-23 PROCEDURE — 85025 COMPLETE CBC W/AUTO DIFF WBC: CPT | Performed by: NURSE PRACTITIONER

## 2018-02-23 PROCEDURE — 81001 URINALYSIS AUTO W/SCOPE: CPT | Performed by: NURSE PRACTITIONER

## 2018-02-23 PROCEDURE — 76830 TRANSVAGINAL US NON-OB: CPT

## 2018-02-23 RX ORDER — SODIUM CHLORIDE 0.9 % (FLUSH) 0.9 %
10 SYRINGE (ML) INJECTION AS NEEDED
Status: DISCONTINUED | OUTPATIENT
Start: 2018-02-23 | End: 2018-02-23 | Stop reason: HOSPADM

## 2018-02-23 RX ORDER — TAMSULOSIN HYDROCHLORIDE 0.4 MG/1
1 CAPSULE ORAL DAILY
Qty: 30 CAPSULE | Refills: 0 | Status: SHIPPED | OUTPATIENT
Start: 2018-02-23 | End: 2018-10-12

## 2018-02-23 RX ORDER — CEPHALEXIN 500 MG/1
500 CAPSULE ORAL 2 TIMES DAILY
Qty: 14 CAPSULE | Refills: 0 | Status: SHIPPED | OUTPATIENT
Start: 2018-02-23 | End: 2018-10-12

## 2018-02-23 RX ORDER — FENTANYL CITRATE 50 UG/ML
25 INJECTION, SOLUTION INTRAMUSCULAR; INTRAVENOUS
Status: DISCONTINUED | OUTPATIENT
Start: 2018-02-23 | End: 2018-02-23 | Stop reason: HOSPADM

## 2018-02-23 RX ORDER — ONDANSETRON 2 MG/ML
4 INJECTION INTRAMUSCULAR; INTRAVENOUS ONCE
Status: COMPLETED | OUTPATIENT
Start: 2018-02-23 | End: 2018-02-23

## 2018-02-23 RX ADMIN — SODIUM CHLORIDE 1000 ML: 9 INJECTION, SOLUTION INTRAVENOUS at 15:08

## 2018-02-23 RX ADMIN — ONDANSETRON HYDROCHLORIDE 4 MG: 2 INJECTION, SOLUTION INTRAMUSCULAR; INTRAVENOUS at 15:54

## 2018-02-23 RX ADMIN — FENTANYL CITRATE 25 MCG: 50 INJECTION INTRAMUSCULAR; INTRAVENOUS at 15:54

## 2018-02-23 NOTE — ED PROVIDER NOTES
Subjective   History of Present Illness  This is a 32 year old female who comes in today complaining of right pelvic pain. She reports this pain started about two weeks ago and she came in to see Dr. Bautista and then returned 3 days ago and saw Dr. Kruse.  She has had 2 CT scans and also had a transvaginal ultrasound that revealed a hemorrhagic cyst.  She denies any nausea vomiting or diarrhea.  She states that the pain has become uncontrollable and she is also noted some blood in her urine.  She reports that she has a history of kidney stones as well.  Review of Systems   Constitutional: Negative.    HENT: Negative.    Eyes: Negative.    Respiratory: Negative.    Cardiovascular: Negative.    Gastrointestinal: Negative.    Genitourinary: Positive for difficulty urinating, dysuria, hematuria, pelvic pain and urgency.   Skin: Negative.    Neurological: Negative.    Psychiatric/Behavioral: Negative.    All other systems reviewed and are negative.      Past Medical History:   Diagnosis Date   • Depression    • Diabetes mellitus    • DVT (deep venous thrombosis)    • GERD (gastroesophageal reflux disease)    • Gout    • Hyperlipidemia    • Hypertension    • Hypothyroid    • Kidney stone    • Migraine    • Neuropathy    • PE (pulmonary embolism)        Allergies   Allergen Reactions   • Amoxicillin Hives   • Penicillins Hives   • Toradol [Ketorolac Tromethamine] Hives       Past Surgical History:   Procedure Laterality Date   • CARDIAC SURGERY      Heart Cath done in    •  SECTION     • CHOLECYSTECTOMY     • COLONOSCOPY         Family History   Problem Relation Age of Onset   • No Known Problems Mother    • No Known Problems Father    • No Known Problems Sister    • No Known Problems Brother    • No Known Problems Son    • No Known Problems Daughter    • No Known Problems Maternal Grandmother    • No Known Problems Maternal Grandfather    • No Known Problems Paternal Grandmother    • No Known Problems Paternal  Grandfather    • No Known Problems Cousin    • Rheum arthritis Neg Hx    • Osteoarthritis Neg Hx    • Asthma Neg Hx    • Diabetes Neg Hx    • Heart failure Neg Hx    • Hyperlipidemia Neg Hx    • Hypertension Neg Hx    • Migraines Neg Hx    • Rashes / Skin problems Neg Hx    • Seizures Neg Hx    • Stroke Neg Hx    • Thyroid disease Neg Hx        Social History     Social History   • Marital status:      Spouse name: N/A   • Number of children: N/A   • Years of education: N/A     Social History Main Topics   • Smoking status: Never Smoker   • Smokeless tobacco: Never Used   • Alcohol use No   • Drug use: No   • Sexual activity: Defer     Other Topics Concern   • None     Social History Narrative           Objective   Physical Exam   Constitutional: She appears well-developed and well-nourished.   Nursing note and vitals reviewed.  GEN: No acute distress  Head: Normocephalic, atraumatic  Eyes: Pupils equal round reactive to light  ENT: Posterior pharynx normal in appearance, oral mucosa is moist  Chest: Nontender to palpation  Cardiovascular: Regular rate  Lungs: Clear to auscultation bilaterally  Abdomen: Soft,tender right pelvic  , nondistended, no peritoneal signs  Extremities: No edema, normal appearance  Neuro: GCS 15  Psych: Mood and affect are appropriate      Procedures         ED Course  ED Course   Comment By Time   Her transvaginal ultrasound is negative for hemorrhagic cyst today.  She does have a large amount of blood in her urine.  With her history of kidney stones it is possible that she does have another kidney stone.  However after reviewing her medical records she has had 10 CT scans in the past year of her abdomen and pelvis.  I have discussed the risk and benefits of this and have spoken to her in great detail and have given her the option of either repeating a CT scan today or treat her as if this is a kidney stone and having her follow up with urology.  Today she is concerned about another  CT scan and does wish to go ahead and just treat this as if this is a kidney stone and she will follow up with the urologist that we refer her to.  I've advised her that we will start her on some Flomax and we will also encourage her to drink fluids and strain her urine.  I've given her strict return to care instructions and she is agreeable to this plan of care.  Also noting she does have complaint of dysuria we will give her some antibiotics just in case she has a urinary tract infection as well. Ashley Daniel, APRN 02/23 1620                  MDM  Number of Diagnoses or Management Options  Diagnosis management comments: I reviewed Mrs. Arzate's medical records and she does have a history of kidney stones and also a recent history of a hemorrhagic ovarian cyst on the right side.  At this point we will go ahead and order a transvaginal ultrasound to see if the cyst is causing her pain.  She did have a CT scan 3 days ago and did not show a kidney stone.       Amount and/or Complexity of Data Reviewed  Clinical lab tests: reviewed and ordered  Tests in the radiology section of CPT®: reviewed and ordered  Decide to obtain previous medical records or to obtain history from someone other than the patient: yes  Review and summarize past medical records: yes  Discuss the patient with other providers: yes  Independent visualization of images, tracings, or specimens: yes    Risk of Complications, Morbidity, and/or Mortality  Presenting problems: moderate  Diagnostic procedures: moderate  Management options: moderate        Final diagnoses:   Hematuria, unspecified type   History of kidney stones   Right flank pain   Subtherapeutic international normalized ratio (INR)            Ashley Daniel, YEIMI  02/23/18 [id number]

## 2018-02-23 NOTE — DISCHARGE INSTRUCTIONS
Abdominal Pain, Adult  Many things can cause belly (abdominal) pain. Most times, belly pain is not dangerous. Many cases of belly pain can be watched and treated at home. Sometimes belly pain is serious, though. Your doctor will try to find the cause of your belly pain.  Follow these instructions at home:  · Take over-the-counter and prescription medicines only as told by your doctor. Do not take medicines that help you poop (laxatives) unless told to by your doctor.  · Drink enough fluid to keep your pee (urine) clear or pale yellow.  · Watch your belly pain for any changes.  · Keep all follow-up visits as told by your doctor. This is important.  Contact a doctor if:  · Your belly pain changes or gets worse.  · You are not hungry, or you lose weight without trying.  · You are having trouble pooping (constipated) or have watery poop (diarrhea) for more than 2-3 days.  · You have pain when you pee or poop.  · Your belly pain wakes you up at night.  · Your pain gets worse with meals, after eating, or with certain foods.  · You are throwing up and cannot keep anything down.  · You have a fever.  Get help right away if:  · Your pain does not go away as soon as your doctor says it should.  · You cannot stop throwing up.  · Your pain is only in areas of your belly, such as the right side or the left lower part of the belly.  · You have bloody or black poop, or poop that looks like tar.  · You have very bad pain, cramping, or bloating in your belly.  · You have signs of not having enough fluid or water in your body (dehydration), such as:  ¨ Dark pee, very little pee, or no pee.  ¨ Cracked lips.  ¨ Dry mouth.  ¨ Sunken eyes.  ¨ Sleepiness.  ¨ Weakness.  This information is not intended to replace advice given to you by your health care provider. Make sure you discuss any questions you have with your health care provider.  Document Released: 06/05/2009 Document Revised: 07/07/2017 Document Reviewed: 05/31/2017  [employer]  "Interactive Patient Education © 2017 [employer]      Hypertension  Hypertension, commonly called high blood pressure, is when the force of blood pumping through the arteries is too strong. The arteries are the blood vessels that carry blood from the heart throughout the body. Hypertension forces the heart to work harder to pump blood and may cause arteries to become narrow or stiff. Having untreated or uncontrolled hypertension can cause heart attacks, strokes, kidney disease, and other problems.  A blood pressure reading consists of a higher number over a lower number. Ideally, your blood pressure should be below 120/80. The first (\"top\") number is called the systolic pressure. It is a measure of the pressure in your arteries as your heart beats. The second (\"bottom\") number is called the diastolic pressure. It is a measure of the pressure in your arteries as the heart relaxes.  What are the causes?  The cause of this condition is not known.  What increases the risk?  Some risk factors for high blood pressure are under your control. Others are not.  Factors you can change   · Smoking.  · Having type 2 diabetes mellitus, high cholesterol, or both.  · Not getting enough exercise or physical activity.  · Being overweight.  · Having too much fat, sugar, calories, or salt (sodium) in your diet.  · Drinking too much alcohol.  Factors that are difficult or impossible to change   · Having chronic kidney disease.  · Having a family history of high blood pressure.  · Age. Risk increases with age.  · Race. You may be at higher risk if you are -American.  · Gender. Men are at higher risk than women before age 45. After age 65, women are at higher risk than men.  · Having obstructive sleep apnea.  · Stress.  What are the signs or symptoms?  Extremely high blood pressure (hypertensive crisis) may cause:  · Headache.  · Anxiety.  · Shortness of breath.  · Nosebleed.  · Nausea and vomiting.  · Severe chest pain.  · Jerky " movements you cannot control (seizures).  How is this diagnosed?  This condition is diagnosed by measuring your blood pressure while you are seated, with your arm resting on a surface. The cuff of the blood pressure monitor will be placed directly against the skin of your upper arm at the level of your heart. It should be measured at least twice using the same arm. Certain conditions can cause a difference in blood pressure between your right and left arms.  Certain factors can cause blood pressure readings to be lower or higher than normal (elevated) for a short period of time:  · When your blood pressure is higher when you are in a health care provider's office than when you are at home, this is called white coat hypertension. Most people with this condition do not need medicines.  · When your blood pressure is higher at home than when you are in a health care provider's office, this is called masked hypertension. Most people with this condition may need medicines to control blood pressure.  If you have a high blood pressure reading during one visit or you have normal blood pressure with other risk factors:  · You may be asked to return on a different day to have your blood pressure checked again.  · You may be asked to monitor your blood pressure at home for 1 week or longer.  If you are diagnosed with hypertension, you may have other blood or imaging tests to help your health care provider understand your overall risk for other conditions.  How is this treated?  This condition is treated by making healthy lifestyle changes, such as eating healthy foods, exercising more, and reducing your alcohol intake. Your health care provider may prescribe medicine if lifestyle changes are not enough to get your blood pressure under control, and if:  · Your systolic blood pressure is above 130.  · Your diastolic blood pressure is above 80.  Your personal target blood pressure may vary depending on your medical conditions,  your age, and other factors.  Follow these instructions at home:  Eating and drinking   · Eat a diet that is high in fiber and potassium, and low in sodium, added sugar, and fat. An example eating plan is called the DASH (Dietary Approaches to Stop Hypertension) diet. To eat this way:  ¨ Eat plenty of fresh fruits and vegetables. Try to fill half of your plate at each meal with fruits and vegetables.  ¨ Eat whole grains, such as whole wheat pasta, brown rice, or whole grain bread. Fill about one quarter of your plate with whole grains.  ¨ Eat or drink low-fat dairy products, such as skim milk or low-fat yogurt.  ¨ Avoid fatty cuts of meat, processed or cured meats, and poultry with skin. Fill about one quarter of your plate with lean proteins, such as fish, chicken without skin, beans, eggs, and tofu.  ¨ Avoid premade and processed foods. These tend to be higher in sodium, added sugar, and fat.  · Reduce your daily sodium intake. Most people with hypertension should eat less than 1,500 mg of sodium a day.  · Limit alcohol intake to no more than 1 drink a day for nonpregnant women and 2 drinks a day for men. One drink equals 12 oz of beer, 5 oz of wine, or 1½ oz of hard liquor.  Lifestyle   · Work with your health care provider to maintain a healthy body weight or to lose weight. Ask what an ideal weight is for you.  · Get at least 30 minutes of exercise that causes your heart to beat faster (aerobic exercise) most days of the week. Activities may include walking, swimming, or biking.  · Include exercise to strengthen your muscles (resistance exercise), such as pilates or lifting weights, as part of your weekly exercise routine. Try to do these types of exercises for 30 minutes at least 3 days a week.  · Do not use any products that contain nicotine or tobacco, such as cigarettes and e-cigarettes. If you need help quitting, ask your health care provider.  · Monitor your blood pressure at home as told by your health  care provider.  · Keep all follow-up visits as told by your health care provider. This is important.  Medicines   · Take over-the-counter and prescription medicines only as told by your health care provider. Follow directions carefully. Blood pressure medicines must be taken as prescribed.  · Do not skip doses of blood pressure medicine. Doing this puts you at risk for problems and can make the medicine less effective.  · Ask your health care provider about side effects or reactions to medicines that you should watch for.  Contact a health care provider if:  · You think you are having a reaction to a medicine you are taking.  · You have headaches that keep coming back (recurring).  · You feel dizzy.  · You have swelling in your ankles.  · You have trouble with your vision.  Get help right away if:  · You develop a severe headache or confusion.  · You have unusual weakness or numbness.  · You feel faint.  · You have severe pain in your chest or abdomen.  · You vomit repeatedly.  · You have trouble breathing.  Summary  · Hypertension is when the force of blood pumping through your arteries is too strong. If this condition is not controlled, it may put you at risk for serious complications.  · Your personal target blood pressure may vary depending on your medical conditions, your age, and other factors. For most people, a normal blood pressure is less than 120/80.  · Hypertension is treated with lifestyle changes, medicines, or a combination of both. Lifestyle changes include weight loss, eating a healthy, low-sodium diet, exercising more, and limiting alcohol.  This information is not intended to replace advice given to you by your health care provider. Make sure you discuss any questions you have with your health care provider.  Document Released: 12/18/2006 Document Revised: 11/15/2017 Document Reviewed: 11/15/2017  [employer] Interactive Patient Education © 2017 [employer] Inc.

## 2018-03-02 ENCOUNTER — HOSPITAL ENCOUNTER (EMERGENCY)
Facility: HOSPITAL | Age: 33
Discharge: HOME | End: 2018-03-02
Attending: STUDENT IN AN ORGANIZED HEALTH CARE EDUCATION/TRAINING PROGRAM | Admitting: EMERGENCY MEDICINE

## 2018-03-02 ENCOUNTER — APPOINTMENT (OUTPATIENT)
Dept: CT IMAGING | Facility: HOSPITAL | Age: 33
End: 2018-03-02

## 2018-03-02 VITALS
RESPIRATION RATE: 16 BRPM | SYSTOLIC BLOOD PRESSURE: 137 MMHG | HEART RATE: 84 BPM | OXYGEN SATURATION: 94 % | HEIGHT: 64 IN | BODY MASS INDEX: 50.02 KG/M2 | DIASTOLIC BLOOD PRESSURE: 85 MMHG | TEMPERATURE: 97.9 F | WEIGHT: 293 LBS

## 2018-03-02 DIAGNOSIS — E66.01 MORBID OBESITY (HCC): ICD-10-CM

## 2018-03-02 DIAGNOSIS — R31.0 GROSS HEMATURIA: ICD-10-CM

## 2018-03-02 DIAGNOSIS — R10.9 FLANK PAIN: Primary | ICD-10-CM

## 2018-03-02 DIAGNOSIS — N83.8 OVARIAN MASS: ICD-10-CM

## 2018-03-02 LAB
ALBUMIN SERPL-MCNC: 4.3 G/DL (ref 3.5–5)
ALBUMIN/GLOB SERPL: 1.3 G/DL (ref 1–2)
ALP SERPL-CCNC: 76 U/L (ref 38–126)
ALT SERPL W P-5'-P-CCNC: 65 U/L (ref 13–69)
ANION GAP SERPL CALCULATED.3IONS-SCNC: 23.6 MMOL/L
AST SERPL-CCNC: 129 U/L (ref 15–46)
BACTERIA UR QL AUTO: ABNORMAL /HPF
BASOPHILS # BLD AUTO: 0.02 10*3/MM3 (ref 0–0.2)
BASOPHILS NFR BLD AUTO: 0.5 % (ref 0–2.5)
BILIRUB SERPL-MCNC: 0.7 MG/DL (ref 0.2–1.3)
BILIRUB UR QL STRIP: NEGATIVE
BUN BLD-MCNC: 12 MG/DL (ref 7–20)
BUN/CREAT SERPL: 24 (ref 7.1–23.5)
CALCIUM SPEC-SCNC: 9.2 MG/DL (ref 8.4–10.2)
CHLORIDE SERPL-SCNC: 105 MMOL/L (ref 98–107)
CLARITY UR: ABNORMAL
CO2 SERPL-SCNC: 19 MMOL/L (ref 26–30)
COLOR UR: YELLOW
CREAT BLD-MCNC: 0.5 MG/DL (ref 0.6–1.3)
DEPRECATED RDW RBC AUTO: 50.7 FL (ref 37–54)
EOSINOPHIL # BLD AUTO: 0.08 10*3/MM3 (ref 0–0.7)
EOSINOPHIL NFR BLD AUTO: 1.9 % (ref 0–7)
ERYTHROCYTE [DISTWIDTH] IN BLOOD BY AUTOMATED COUNT: 15.9 % (ref 11.5–14.5)
GFR SERPL CREATININE-BSD FRML MDRD: 144 ML/MIN/1.73
GLOBULIN UR ELPH-MCNC: 3.4 GM/DL
GLUCOSE BLD-MCNC: 313 MG/DL (ref 74–98)
GLUCOSE UR STRIP-MCNC: ABNORMAL MG/DL
HCT VFR BLD AUTO: 33.2 % (ref 37–47)
HGB BLD-MCNC: 10.9 G/DL (ref 12–16)
HGB UR QL STRIP.AUTO: ABNORMAL
HYALINE CASTS UR QL AUTO: ABNORMAL /LPF
IMM GRANULOCYTES # BLD: 0.02 10*3/MM3 (ref 0–0.06)
IMM GRANULOCYTES NFR BLD: 0.5 % (ref 0–0.6)
INR PPP: 1.38 (ref 0.9–1.1)
KETONES UR QL STRIP: ABNORMAL
LEUKOCYTE ESTERASE UR QL STRIP.AUTO: NEGATIVE
LYMPHOCYTES # BLD AUTO: 1.61 10*3/MM3 (ref 0.6–3.4)
LYMPHOCYTES NFR BLD AUTO: 37.6 % (ref 10–50)
MCH RBC QN AUTO: 28.9 PG (ref 27–31)
MCHC RBC AUTO-ENTMCNC: 32.8 G/DL (ref 30–37)
MCV RBC AUTO: 88.1 FL (ref 81–99)
MONOCYTES # BLD AUTO: 0.44 10*3/MM3 (ref 0–0.9)
MONOCYTES NFR BLD AUTO: 10.3 % (ref 0–12)
NEUTROPHILS # BLD AUTO: 2.11 10*3/MM3 (ref 2–6.9)
NEUTROPHILS NFR BLD AUTO: 49.2 % (ref 37–80)
NITRITE UR QL STRIP: NEGATIVE
NRBC BLD MANUAL-RTO: 0 /100 WBC (ref 0–0)
PH UR STRIP.AUTO: <=5 [PH] (ref 5–8)
PLATELET # BLD AUTO: 185 10*3/MM3 (ref 130–400)
PMV BLD AUTO: 9 FL (ref 6–12)
POTASSIUM BLD-SCNC: 4.6 MMOL/L (ref 3.5–5.1)
PROT SERPL-MCNC: 7.7 G/DL (ref 6.3–8.2)
PROT UR QL STRIP: ABNORMAL
PROTHROMBIN TIME: 15.3 SECONDS (ref 9.3–12.1)
RBC # BLD AUTO: 3.77 10*6/MM3 (ref 4.2–5.4)
RBC # UR: ABNORMAL /HPF
REF LAB TEST METHOD: ABNORMAL
SODIUM BLD-SCNC: 143 MMOL/L (ref 137–145)
SP GR UR STRIP: >=1.03 (ref 1–1.03)
SQUAMOUS #/AREA URNS HPF: ABNORMAL /HPF
UROBILINOGEN UR QL STRIP: ABNORMAL
WBC NRBC COR # BLD: 4.28 10*3/MM3 (ref 4.8–10.8)
WBC UR QL AUTO: ABNORMAL /HPF

## 2018-03-02 PROCEDURE — 80053 COMPREHEN METABOLIC PANEL: CPT | Performed by: STUDENT IN AN ORGANIZED HEALTH CARE EDUCATION/TRAINING PROGRAM

## 2018-03-02 PROCEDURE — 25010000002 ONDANSETRON PER 1 MG: Performed by: STUDENT IN AN ORGANIZED HEALTH CARE EDUCATION/TRAINING PROGRAM

## 2018-03-02 PROCEDURE — 25010000002 HALOPERIDOL LACTATE PER 5 MG: Performed by: EMERGENCY MEDICINE

## 2018-03-02 PROCEDURE — 96361 HYDRATE IV INFUSION ADD-ON: CPT

## 2018-03-02 PROCEDURE — 96374 THER/PROPH/DIAG INJ IV PUSH: CPT

## 2018-03-02 PROCEDURE — 96375 TX/PRO/DX INJ NEW DRUG ADDON: CPT

## 2018-03-02 PROCEDURE — 85610 PROTHROMBIN TIME: CPT | Performed by: STUDENT IN AN ORGANIZED HEALTH CARE EDUCATION/TRAINING PROGRAM

## 2018-03-02 PROCEDURE — 81001 URINALYSIS AUTO W/SCOPE: CPT | Performed by: STUDENT IN AN ORGANIZED HEALTH CARE EDUCATION/TRAINING PROGRAM

## 2018-03-02 PROCEDURE — 74176 CT ABD & PELVIS W/O CONTRAST: CPT

## 2018-03-02 PROCEDURE — 25010000002 HYDROMORPHONE PER 4 MG: Performed by: STUDENT IN AN ORGANIZED HEALTH CARE EDUCATION/TRAINING PROGRAM

## 2018-03-02 PROCEDURE — 99283 EMERGENCY DEPT VISIT LOW MDM: CPT

## 2018-03-02 PROCEDURE — 25010000002 DIPHENHYDRAMINE PER 50 MG: Performed by: EMERGENCY MEDICINE

## 2018-03-02 PROCEDURE — 85025 COMPLETE CBC W/AUTO DIFF WBC: CPT | Performed by: STUDENT IN AN ORGANIZED HEALTH CARE EDUCATION/TRAINING PROGRAM

## 2018-03-02 RX ORDER — DIPHENHYDRAMINE HYDROCHLORIDE 50 MG/ML
50 INJECTION INTRAMUSCULAR; INTRAVENOUS ONCE
Status: COMPLETED | OUTPATIENT
Start: 2018-03-02 | End: 2018-03-02

## 2018-03-02 RX ORDER — HALOPERIDOL 5 MG/ML
5 INJECTION INTRAMUSCULAR ONCE
Status: COMPLETED | OUTPATIENT
Start: 2018-03-02 | End: 2018-03-02

## 2018-03-02 RX ORDER — ONDANSETRON 2 MG/ML
8 INJECTION INTRAMUSCULAR; INTRAVENOUS ONCE
Status: COMPLETED | OUTPATIENT
Start: 2018-03-02 | End: 2018-03-02

## 2018-03-02 RX ADMIN — ONDANSETRON 8 MG: 2 INJECTION INTRAMUSCULAR; INTRAVENOUS at 07:23

## 2018-03-02 RX ADMIN — DIPHENHYDRAMINE HYDROCHLORIDE 50 MG: 50 INJECTION, SOLUTION INTRAMUSCULAR; INTRAVENOUS at 08:01

## 2018-03-02 RX ADMIN — HYDROMORPHONE HYDROCHLORIDE 1 MG: 1 INJECTION, SOLUTION INTRAMUSCULAR; INTRAVENOUS; SUBCUTANEOUS at 07:23

## 2018-03-02 RX ADMIN — SODIUM CHLORIDE 1000 ML: 9 INJECTION, SOLUTION INTRAVENOUS at 07:23

## 2018-03-02 RX ADMIN — HALOPERIDOL LACTATE 5 MG: 5 INJECTION, SOLUTION INTRAMUSCULAR at 08:02

## 2018-03-02 NOTE — ED PROVIDER NOTES
I received patient in sign out.  Labwork reveals no acute or significant abnormality.  Urinalysis has gross blood but no signs of infection.  CT scan read demonstrates the solid appearing right ovarian mass.  She tells me she just saw her gynecologist last week and is scheduled for another follow-up this month.  When I went into the room patient was asleep and appeared to be very comfortable.  Her blood pressure trended down after the treatment of her pain.  At this time I feel she is safe for discharge home.  I encouraged her to follow-up closely with her urologist and gynecologist.     Ted Kruse MD  03/02/18 1860

## 2018-03-02 NOTE — ED PROVIDER NOTES
Subjective   HPI Comments: Patient is a 32-year-old female with a past medical history prominent for kidney stones and ovarian cysts who presents with right-sided flank pain that has persisted for 2 weeks and is getting much worse over the past 2 days.  She does states she's had some nausea and vomiting.  Frequent urination and pain while urinating.  Patient states pain is severe, constant, sharp and located in her right flank and radiates into her groin.  Patient does take Coumadin for DVT in the past.      Review of Systems   All other systems reviewed and are negative.      Past Medical History:   Diagnosis Date   • Depression    • Diabetes mellitus    • DVT (deep venous thrombosis)    • GERD (gastroesophageal reflux disease)    • Gout    • Hyperlipidemia    • Hypertension    • Hypothyroid    • Kidney stone    • Migraine    • Neuropathy    • PE (pulmonary embolism)        Allergies   Allergen Reactions   • Amoxicillin Hives   • Penicillins Hives   • Toradol [Ketorolac Tromethamine] Hives       Past Surgical History:   Procedure Laterality Date   • CARDIAC SURGERY      Heart Cath done in    •  SECTION     • CHOLECYSTECTOMY     • COLONOSCOPY         Family History   Problem Relation Age of Onset   • No Known Problems Mother    • No Known Problems Father    • No Known Problems Sister    • No Known Problems Brother    • No Known Problems Son    • No Known Problems Daughter    • No Known Problems Maternal Grandmother    • No Known Problems Maternal Grandfather    • No Known Problems Paternal Grandmother    • No Known Problems Paternal Grandfather    • No Known Problems Cousin    • Rheum arthritis Neg Hx    • Osteoarthritis Neg Hx    • Asthma Neg Hx    • Diabetes Neg Hx    • Heart failure Neg Hx    • Hyperlipidemia Neg Hx    • Hypertension Neg Hx    • Migraines Neg Hx    • Rashes / Skin problems Neg Hx    • Seizures Neg Hx    • Stroke Neg Hx    • Thyroid disease Neg Hx        Social History     Social  History   • Marital status:      Social History Main Topics   • Smoking status: Never Smoker   • Smokeless tobacco: Never Used   • Alcohol use No   • Drug use: No   • Sexual activity: Defer           Objective   Physical Exam   Nursing note and vitals reviewed.    GEN: Patient appears uncomfortable, but nontoxic.  She is tearful during interview.  Morbidly obese  Head: Normocephalic, atraumatic  Eyes: Pupils equal round reactive to light  ENT: Posterior pharynx normal in appearance, oral mucosa is moist  Chest: Nontender to palpation  Cardiovascular: Regular rate  Lungs: Clear to auscultation bilaterally  Abdomen: Soft, nontender, nondistended, no peritoneal signs, exam extremely limited secondary to patient's body habitus  Extremities: No edema, normal appearance  Neuro: GCS 15  Psych: Anxious    Procedures         ED Course  ED Course                  MDM  Number of Diagnoses or Management Options  Diagnosis management comments: Differential diagnosis would include kidney stone, pyelonephritis, ovarian cyst, musculoskeletal back pain, AAA, aortic dissection, or other concerns.  Treated with IV narcotics, anti-inflammatories, antiemetics, and fluids with good symptom control.  Much discussion was given to whether or not the patient should be imaged.  Did discuss risks of cancer with frequent scans.  Patient requested scan regardless of risk.  Urine was grossly bloody although I believe this is likely secondary to Coumadin.  Did run a [facility] report on this patient she is at 12 units in the past year with the last one reported when I saw her on February 3 for hemorrhagic ovarian cyst.  Before that her prescriptions were in the previous year.  At [facility] laboratories are pending.  I do question her hypertension being secondary to pain and will defer treatment of the blood pressure until after she is pain controlled.       Amount and/or Complexity of Data Reviewed  Decide to obtain previous medical records  or to obtain history from someone other than the patient: yes  Obtain history from someone other than the patient: yes  Review and summarize past medical records: yes  Discuss the patient with other providers: yes        Final diagnoses:   Flank pain   Morbid obesity   Gross hematuria            Neo Bautista MD  03/02/18 1900

## 2018-03-02 NOTE — DISCHARGE INSTRUCTIONS
"    Hypertension  Hypertension, commonly called high blood pressure, is when the force of blood pumping through the arteries is too strong. The arteries are the blood vessels that carry blood from the heart throughout the body. Hypertension forces the heart to work harder to pump blood and may cause arteries to become narrow or stiff. Having untreated or uncontrolled hypertension can cause heart attacks, strokes, kidney disease, and other problems.  A blood pressure reading consists of a higher number over a lower number. Ideally, your blood pressure should be below 120/80. The first (\"top\") number is called the systolic pressure. It is a measure of the pressure in your arteries as your heart beats. The second (\"bottom\") number is called the diastolic pressure. It is a measure of the pressure in your arteries as the heart relaxes.  What are the causes?  The cause of this condition is not known.  What increases the risk?  Some risk factors for high blood pressure are under your control. Others are not.  Factors you can change   · Smoking.  · Having type 2 diabetes mellitus, high cholesterol, or both.  · Not getting enough exercise or physical activity.  · Being overweight.  · Having too much fat, sugar, calories, or salt (sodium) in your diet.  · Drinking too much alcohol.  Factors that are difficult or impossible to change   · Having chronic kidney disease.  · Having a family history of high blood pressure.  · Age. Risk increases with age.  · Race. You may be at higher risk if you are -American.  · Gender. Men are at higher risk than women before age 45. After age 65, women are at higher risk than men.  · Having obstructive sleep apnea.  · Stress.  What are the signs or symptoms?  Extremely high blood pressure (hypertensive crisis) may cause:  · Headache.  · Anxiety.  · Shortness of breath.  · Nosebleed.  · Nausea and vomiting.  · Severe chest pain.  · Jerky movements you cannot control (seizures).  How is " this diagnosed?  This condition is diagnosed by measuring your blood pressure while you are seated, with your arm resting on a surface. The cuff of the blood pressure monitor will be placed directly against the skin of your upper arm at the level of your heart. It should be measured at least twice using the same arm. Certain conditions can cause a difference in blood pressure between your right and left arms.  Certain factors can cause blood pressure readings to be lower or higher than normal (elevated) for a short period of time:  · When your blood pressure is higher when you are in a health care provider's office than when you are at home, this is called white coat hypertension. Most people with this condition do not need medicines.  · When your blood pressure is higher at home than when you are in a health care provider's office, this is called masked hypertension. Most people with this condition may need medicines to control blood pressure.  If you have a high blood pressure reading during one visit or you have normal blood pressure with other risk factors:  · You may be asked to return on a different day to have your blood pressure checked again.  · You may be asked to monitor your blood pressure at home for 1 week or longer.  If you are diagnosed with hypertension, you may have other blood or imaging tests to help your health care provider understand your overall risk for other conditions.  How is this treated?  This condition is treated by making healthy lifestyle changes, such as eating healthy foods, exercising more, and reducing your alcohol intake. Your health care provider may prescribe medicine if lifestyle changes are not enough to get your blood pressure under control, and if:  · Your systolic blood pressure is above 130.  · Your diastolic blood pressure is above 80.  Your personal target blood pressure may vary depending on your medical conditions, your age, and other factors.  Follow these  instructions at home:  Eating and drinking   · Eat a diet that is high in fiber and potassium, and low in sodium, added sugar, and fat. An example eating plan is called the DASH (Dietary Approaches to Stop Hypertension) diet. To eat this way:  ¨ Eat plenty of fresh fruits and vegetables. Try to fill half of your plate at each meal with fruits and vegetables.  ¨ Eat whole grains, such as whole wheat pasta, brown rice, or whole grain bread. Fill about one quarter of your plate with whole grains.  ¨ Eat or drink low-fat dairy products, such as skim milk or low-fat yogurt.  ¨ Avoid fatty cuts of meat, processed or cured meats, and poultry with skin. Fill about one quarter of your plate with lean proteins, such as fish, chicken without skin, beans, eggs, and tofu.  ¨ Avoid premade and processed foods. These tend to be higher in sodium, added sugar, and fat.  · Reduce your daily sodium intake. Most people with hypertension should eat less than 1,500 mg of sodium a day.  · Limit alcohol intake to no more than 1 drink a day for nonpregnant women and 2 drinks a day for men. One drink equals 12 oz of beer, 5 oz of wine, or 1½ oz of hard liquor.  Lifestyle   · Work with your health care provider to maintain a healthy body weight or to lose weight. Ask what an ideal weight is for you.  · Get at least 30 minutes of exercise that causes your heart to beat faster (aerobic exercise) most days of the week. Activities may include walking, swimming, or biking.  · Include exercise to strengthen your muscles (resistance exercise), such as pilates or lifting weights, as part of your weekly exercise routine. Try to do these types of exercises for 30 minutes at least 3 days a week.  · Do not use any products that contain nicotine or tobacco, such as cigarettes and e-cigarettes. If you need help quitting, ask your health care provider.  · Monitor your blood pressure at home as told by your health care provider.  · Keep all follow-up visits  as told by your health care provider. This is important.  Medicines   · Take over-the-counter and prescription medicines only as told by your health care provider. Follow directions carefully. Blood pressure medicines must be taken as prescribed.  · Do not skip doses of blood pressure medicine. Doing this puts you at risk for problems and can make the medicine less effective.  · Ask your health care provider about side effects or reactions to medicines that you should watch for.  Contact a health care provider if:  · You think you are having a reaction to a medicine you are taking.  · You have headaches that keep coming back (recurring).  · You feel dizzy.  · You have swelling in your ankles.  · You have trouble with your vision.  Get help right away if:  · You develop a severe headache or confusion.  · You have unusual weakness or numbness.  · You feel faint.  · You have severe pain in your chest or abdomen.  · You vomit repeatedly.  · You have trouble breathing.  Summary  · Hypertension is when the force of blood pumping through your arteries is too strong. If this condition is not controlled, it may put you at risk for serious complications.  · Your personal target blood pressure may vary depending on your medical conditions, your age, and other factors. For most people, a normal blood pressure is less than 120/80.  · Hypertension is treated with lifestyle changes, medicines, or a combination of both. Lifestyle changes include weight loss, eating a healthy, low-sodium diet, exercising more, and limiting alcohol.  This information is not intended to replace advice given to you by your health care provider. Make sure you discuss any questions you have with your health care provider.  Document Released: 12/18/2006 Document Revised: 11/15/2017 Document Reviewed: 11/15/2017  [employer] Interactive Patient Education © 2017 [employer]

## 2018-03-06 ENCOUNTER — HOSPITAL ENCOUNTER (EMERGENCY)
Facility: HOSPITAL | Age: 33
Discharge: HOME | End: 2018-03-07
Attending: EMERGENCY MEDICINE | Admitting: EMERGENCY MEDICINE

## 2018-03-06 DIAGNOSIS — R10.9 BILATERAL FLANK PAIN: Primary | ICD-10-CM

## 2018-03-06 DIAGNOSIS — R82.71 BACTERIA IN URINE: ICD-10-CM

## 2018-03-06 DIAGNOSIS — N83.8 OVARIAN MASS, RIGHT: ICD-10-CM

## 2018-03-06 PROCEDURE — 87086 URINE CULTURE/COLONY COUNT: CPT | Performed by: PHYSICIAN ASSISTANT

## 2018-03-06 PROCEDURE — 81001 URINALYSIS AUTO W/SCOPE: CPT | Performed by: PHYSICIAN ASSISTANT

## 2018-03-06 PROCEDURE — 99283 EMERGENCY DEPT VISIT LOW MDM: CPT

## 2018-03-06 PROCEDURE — 81025 URINE PREGNANCY TEST: CPT | Performed by: PHYSICIAN ASSISTANT

## 2018-03-06 PROCEDURE — 36415 COLL VENOUS BLD VENIPUNCTURE: CPT

## 2018-03-07 VITALS
SYSTOLIC BLOOD PRESSURE: 160 MMHG | WEIGHT: 293 LBS | TEMPERATURE: 98.5 F | BODY MASS INDEX: 50.02 KG/M2 | OXYGEN SATURATION: 96 % | HEIGHT: 64 IN | HEART RATE: 92 BPM | DIASTOLIC BLOOD PRESSURE: 100 MMHG | RESPIRATION RATE: 16 BRPM

## 2018-03-07 LAB
ALBUMIN SERPL-MCNC: 4.6 G/DL (ref 3.5–5)
ALBUMIN/GLOB SERPL: 1.3 G/DL (ref 1–2)
ALP SERPL-CCNC: 82 U/L (ref 38–126)
ALT SERPL W P-5'-P-CCNC: 73 U/L (ref 13–69)
AMORPH URATE CRY URNS QL MICRO: ABNORMAL /HPF
ANION GAP SERPL CALCULATED.3IONS-SCNC: 20.1 MMOL/L
AST SERPL-CCNC: 132 U/L (ref 15–46)
B-HCG UR QL: NEGATIVE
BACTERIA UR QL AUTO: ABNORMAL /HPF
BASOPHILS # BLD AUTO: 0.03 10*3/MM3 (ref 0–0.2)
BASOPHILS NFR BLD AUTO: 0.6 % (ref 0–2.5)
BILIRUB SERPL-MCNC: 0.5 MG/DL (ref 0.2–1.3)
BILIRUB UR QL STRIP: NEGATIVE
BUN BLD-MCNC: 12 MG/DL (ref 7–20)
BUN/CREAT SERPL: 30 (ref 7.1–23.5)
CALCIUM SPEC-SCNC: 10.8 MG/DL (ref 8.4–10.2)
CHLORIDE SERPL-SCNC: 98 MMOL/L (ref 98–107)
CLARITY UR: ABNORMAL
CO2 SERPL-SCNC: 27 MMOL/L (ref 26–30)
COLOR UR: ABNORMAL
CREAT BLD-MCNC: 0.4 MG/DL (ref 0.6–1.3)
DEPRECATED RDW RBC AUTO: 47.8 FL (ref 37–54)
EOSINOPHIL # BLD AUTO: 0.08 10*3/MM3 (ref 0–0.7)
EOSINOPHIL NFR BLD AUTO: 1.7 % (ref 0–7)
ERYTHROCYTE [DISTWIDTH] IN BLOOD BY AUTOMATED COUNT: 15.1 % (ref 11.5–14.5)
GFR SERPL CREATININE-BSD FRML MDRD: >150 ML/MIN/1.73
GLOBULIN UR ELPH-MCNC: 3.6 GM/DL
GLUCOSE BLD-MCNC: 267 MG/DL (ref 74–98)
GLUCOSE UR STRIP-MCNC: NEGATIVE MG/DL
HCT VFR BLD AUTO: 34.2 % (ref 37–47)
HGB BLD-MCNC: 11.5 G/DL (ref 12–16)
HGB UR QL STRIP.AUTO: ABNORMAL
HYALINE CASTS UR QL AUTO: ABNORMAL /LPF
IMM GRANULOCYTES # BLD: 0.01 10*3/MM3 (ref 0–0.06)
IMM GRANULOCYTES NFR BLD: 0.2 % (ref 0–0.6)
INR PPP: 1.23 (ref 0.9–1.1)
KETONES UR QL STRIP: NEGATIVE
LEUKOCYTE ESTERASE UR QL STRIP.AUTO: NEGATIVE
LYMPHOCYTES # BLD AUTO: 1.75 10*3/MM3 (ref 0.6–3.4)
LYMPHOCYTES NFR BLD AUTO: 36.5 % (ref 10–50)
MCH RBC QN AUTO: 29.2 PG (ref 27–31)
MCHC RBC AUTO-ENTMCNC: 33.6 G/DL (ref 30–37)
MCV RBC AUTO: 86.8 FL (ref 81–99)
MONOCYTES # BLD AUTO: 0.47 10*3/MM3 (ref 0–0.9)
MONOCYTES NFR BLD AUTO: 9.8 % (ref 0–12)
NEUTROPHILS # BLD AUTO: 2.45 10*3/MM3 (ref 2–6.9)
NEUTROPHILS NFR BLD AUTO: 51.2 % (ref 37–80)
NITRITE UR QL STRIP: NEGATIVE
NRBC BLD MANUAL-RTO: 0 /100 WBC (ref 0–0)
PH UR STRIP.AUTO: 7 [PH] (ref 5–8)
PLATELET # BLD AUTO: 202 10*3/MM3 (ref 130–400)
PMV BLD AUTO: 8.6 FL (ref 6–12)
POTASSIUM BLD-SCNC: 4.1 MMOL/L (ref 3.5–5.1)
PROT SERPL-MCNC: 8.2 G/DL (ref 6.3–8.2)
PROT UR QL STRIP: ABNORMAL
PROTHROMBIN TIME: 13.7 SECONDS (ref 9.3–12.1)
RBC # BLD AUTO: 3.94 10*6/MM3 (ref 4.2–5.4)
RBC # UR: ABNORMAL /HPF
REF LAB TEST METHOD: ABNORMAL
SODIUM BLD-SCNC: 141 MMOL/L (ref 137–145)
SP GR UR STRIP: 1.02 (ref 1–1.03)
SQUAMOUS #/AREA URNS HPF: ABNORMAL /HPF
UROBILINOGEN UR QL STRIP: ABNORMAL
WBC NRBC COR # BLD: 4.79 10*3/MM3 (ref 4.8–10.8)
WBC UR QL AUTO: ABNORMAL /HPF

## 2018-03-07 PROCEDURE — 80053 COMPREHEN METABOLIC PANEL: CPT | Performed by: PHYSICIAN ASSISTANT

## 2018-03-07 PROCEDURE — 85025 COMPLETE CBC W/AUTO DIFF WBC: CPT | Performed by: PHYSICIAN ASSISTANT

## 2018-03-07 PROCEDURE — 36415 COLL VENOUS BLD VENIPUNCTURE: CPT

## 2018-03-07 PROCEDURE — 85610 PROTHROMBIN TIME: CPT | Performed by: PHYSICIAN ASSISTANT

## 2018-03-07 RX ORDER — CEPHALEXIN 500 MG/1
500 CAPSULE ORAL 2 TIMES DAILY
Qty: 14 CAPSULE | Refills: 0 | Status: SHIPPED | OUTPATIENT
Start: 2018-03-07 | End: 2018-10-12

## 2018-03-07 RX ORDER — PHENAZOPYRIDINE HYDROCHLORIDE 200 MG/1
200 TABLET, FILM COATED ORAL 3 TIMES DAILY PRN
Qty: 5 TABLET | Refills: 0 | Status: SHIPPED | OUTPATIENT
Start: 2018-03-07 | End: 2019-04-25

## 2018-03-07 RX ORDER — PHENAZOPYRIDINE HYDROCHLORIDE 100 MG/1
200 TABLET, FILM COATED ORAL ONCE
Status: COMPLETED | OUTPATIENT
Start: 2018-03-07 | End: 2018-03-07

## 2018-03-07 RX ORDER — HYDROCODONE BITARTRATE AND ACETAMINOPHEN 7.5; 325 MG/1; MG/1
1 TABLET ORAL ONCE
Status: COMPLETED | OUTPATIENT
Start: 2018-03-07 | End: 2018-03-07

## 2018-03-07 RX ADMIN — HYDROCODONE BITARTRATE AND ACETAMINOPHEN 1 TABLET: 7.5; 325 TABLET ORAL at 00:36

## 2018-03-07 RX ADMIN — PHENAZOPYRIDINE HYDROCHLORIDE 200 MG: 100 TABLET ORAL at 00:36

## 2018-03-07 NOTE — ED PROVIDER NOTES
Subjective   HPI Comments: 32-year-old female that is on Coumadin.  She is here with dysuria, urinary frequency and severe, sharp, bilateral flank pain for 3-4 days.  Some nausea.  No definite fever.  No vomiting.  Symptoms are progressively worsening.  History of kidney stones that she has passed previously.  The pain radiates to the right lower quadrant.  History of a 6 cm right ovarian cyst.      Aggravating factors: Urination.  Alleviating factors: None.  Treatment prior to arrival: None.    I have reviewed the patient's old records.  She had a CT scan of her abdomen and pelvis performed on 3/2/18.  This revealed an intrarenal stone on the left.  5.2 cm right ovarian mass.  No ureteral stones.  She has had 16 CT scans of the abdomen/pelvis since 2016.      History provided by:  Patient  History limited by: nothing.   used: No        Review of Systems   Constitutional: Negative.    HENT: Negative.    Eyes: Negative.    Respiratory: Negative.    Cardiovascular: Negative.  Negative for chest pain.   Gastrointestinal: Positive for abdominal pain and nausea.   Endocrine: Negative.    Genitourinary: Positive for dysuria, flank pain and hematuria.   Skin: Negative.    Allergic/Immunologic: Negative.    Neurological: Negative.    Hematological: Negative.    Psychiatric/Behavioral: Negative.    All other systems reviewed and are negative.      Past Medical History:   Diagnosis Date   • Depression    • Diabetes mellitus    • DVT (deep venous thrombosis)    • GERD (gastroesophageal reflux disease)    • Gout    • Hyperlipidemia    • Hypertension    • Hypothyroid    • Kidney stone    • Migraine    • Neuropathy    • PE (pulmonary embolism)        Allergies   Allergen Reactions   • Amoxicillin Hives   • Penicillins Hives   • Toradol [Ketorolac Tromethamine] Hives       Past Surgical History:   Procedure Laterality Date   • CARDIAC SURGERY      Heart Cath done in    •  SECTION     •  CHOLECYSTECTOMY     • COLONOSCOPY         Family History   Problem Relation Age of Onset   • No Known Problems Mother    • No Known Problems Father    • No Known Problems Sister    • No Known Problems Brother    • No Known Problems Son    • No Known Problems Daughter    • No Known Problems Maternal Grandmother    • No Known Problems Maternal Grandfather    • No Known Problems Paternal Grandmother    • No Known Problems Paternal Grandfather    • No Known Problems Cousin    • Rheum arthritis Neg Hx    • Osteoarthritis Neg Hx    • Asthma Neg Hx    • Diabetes Neg Hx    • Heart failure Neg Hx    • Hyperlipidemia Neg Hx    • Hypertension Neg Hx    • Migraines Neg Hx    • Rashes / Skin problems Neg Hx    • Seizures Neg Hx    • Stroke Neg Hx    • Thyroid disease Neg Hx        Social History     Social History   • Marital status:      Social History Main Topics   • Smoking status: Never Smoker   • Smokeless tobacco: Never Used   • Alcohol use No   • Drug use: No   • Sexual activity: Defer           Objective   Physical Exam   Constitutional: She is oriented to person, place, and time. She appears well-developed and well-nourished. No distress.   HENT:   Head: Normocephalic and atraumatic.   Right Ear: External ear normal.   Left Ear: External ear normal.   Eyes: EOM are normal. Pupils are equal, round, and reactive to light.   Neck: Normal range of motion. Neck supple.   Cardiovascular: Normal rate, regular rhythm and normal heart sounds.    Pulmonary/Chest: Effort normal and breath sounds normal. No stridor. She has no wheezes. She exhibits no tenderness.   Abdominal: Soft. She exhibits no distension. There is no rebound and no guarding.   Mild bilateral CVA tenderness.  Mild suprapubic tenderness.   Musculoskeletal: Normal range of motion. She exhibits no edema.   Neurological: She is alert and oriented to person, place, and time.   Skin: Skin is warm and dry. No rash noted. She is not diaphoretic.   Psychiatric:  She has a normal mood and affect. Her behavior is normal. Judgment and thought content normal.   Nursing note and vitals reviewed.      Procedures         ED Course  ED Course                  MDM  Number of Diagnoses or Management Options  Bacteria in urine: new and requires workup  Bilateral flank pain: new and requires workup  Ovarian mass, right: new and requires workup  Diagnosis management comments: The patient was made aware of a right ovarian mass.  She understands that cancer cannot be excluded in the emergency department.  She understands to follow-up with a gynecologist for workup.  She was supplied with Dr. Prescott phone number.  Hematuria is chronic.  She understands she needs to see Dr. De La Torre for workup of the hematuria.  We will treat with Keflex for bacteria in urine.  Patient was placed on Keflex the other day.  Advised to continue it.  Repeatedly requesting pain medication.  I explained to her that pain medication was not indicated for a urinary tract infection.  We gave her Lortab 7.5 here which she was not satisfied with.       Amount and/or Complexity of Data Reviewed  Clinical lab tests: ordered and reviewed  Decide to obtain previous medical records or to obtain history from someone other than the patient: yes  Review and summarize past medical records: yes  Discuss the patient with other providers: yes    Risk of Complications, Morbidity, and/or Mortality  Presenting problems: moderate  Diagnostic procedures: low  Management options: moderate    Patient Progress  Patient progress: stable      Final diagnoses:   Bilateral flank pain   Ovarian mass, right   Bacteria in urine            Hortensia Albright PA-C  03/07/18 0109       Hortensia Albright PA-C  03/07/18 0121

## 2018-03-07 NOTE — DISCHARGE INSTRUCTIONS
Return to the ER for markedly worsening flank pain, fever, vomiting, new or worsening symptoms.  Finish Keflex.  Over-the-counter Tylenol as needed for pain.    Follow-up with your doctor in the morning for reexamination.  Follow-up with your gynecologist as soon as possible regarding this right ovarian mass.  If you do not have a gynecologist, follow-up with Dr. Anglin.  Call for appointment.    Follow-up with Dr. De La Torre, your urologist, in 2-3 days regarding workup of this blood in your urine.  Call for appointment.

## 2018-03-07 NOTE — ED NOTES
"Patient family came up to nurses station stating \"she is crying her eyes out in the room will you guys please help her.\" Jayesh Albright notified patient in pain. No orders at this time.     Hermelinda Jalloh RN  03/07/18 0106    "

## 2018-03-08 LAB — BACTERIA SPEC AEROBE CULT: NORMAL

## 2018-04-01 ENCOUNTER — HOSPITAL ENCOUNTER (EMERGENCY)
Facility: HOSPITAL | Age: 33
Discharge: LEFT BEFORE DISCHARGE | End: 2018-04-01
Attending: EMERGENCY MEDICINE | Admitting: EMERGENCY MEDICINE

## 2018-04-01 VITALS
DIASTOLIC BLOOD PRESSURE: 88 MMHG | SYSTOLIC BLOOD PRESSURE: 155 MMHG | RESPIRATION RATE: 18 BRPM | HEART RATE: 79 BPM | BODY MASS INDEX: 51.91 KG/M2 | HEIGHT: 63 IN | TEMPERATURE: 98.1 F | WEIGHT: 293 LBS | OXYGEN SATURATION: 94 %

## 2018-04-01 DIAGNOSIS — R10.9 RIGHT FLANK PAIN: Primary | ICD-10-CM

## 2018-04-01 DIAGNOSIS — Z76.5 DRUG-SEEKING BEHAVIOR: ICD-10-CM

## 2018-04-01 PROCEDURE — 99282 EMERGENCY DEPT VISIT SF MDM: CPT

## 2018-04-01 NOTE — ED PROVIDER NOTES
Subjective   Patient states she's been having right-sided flank pain for the past 2 days has a history of kidney stones feel like this may be similar.  Patient reports she's had blood in her urine.  She said she has had nausea but no vomiting.  Patient reports that she has a history of factor V Leiden and is on Coumadin chronically due to previous DVTs and pulmonary emboli.  I asked the patient specifically if she been anywhere else today about this flank pain.  She specifically denied being anywhere else today.        Flank Pain   Pain location:  R flank  Pain quality: aching and dull    Pain radiates to:  Groin  Pain severity:  Severe  Onset quality:  Sudden  Duration:  2 days  Timing:  Constant  Progression:  Waxing and waning  Chronicity:  Recurrent  Context: not awakening from sleep, not diet changes, not eating, not previous surgeries, not recent illness, not recent sexual activity and not trauma    Relieved by:  Nothing  Worsened by:  Nothing  Ineffective treatments:  None tried  Associated symptoms: hematuria and nausea    Associated symptoms: no anorexia, no chest pain, no chills, no cough, no fever, no hematemesis, no hematochezia and no vaginal bleeding    Risk factors: obesity    Risk factors comment:  On coumadin      Review of Systems   Constitutional: Negative for chills and fever.   Respiratory: Negative for cough, chest tightness and wheezing.    Cardiovascular: Negative for chest pain and palpitations.   Gastrointestinal: Positive for abdominal pain and nausea. Negative for anal bleeding, anorexia, hematemesis and hematochezia.   Genitourinary: Positive for flank pain and hematuria. Negative for vaginal bleeding.   Musculoskeletal: Negative for back pain, neck pain and neck stiffness.   Skin: Negative for pallor and rash.   Neurological: Negative for dizziness and weakness.   Hematological: Negative for adenopathy. Bruises/bleeds easily.   Psychiatric/Behavioral: Negative.    All other systems  reviewed and are negative.      Past Medical History:   Diagnosis Date   • Depression    • Diabetes mellitus    • DVT (deep venous thrombosis)    • GERD (gastroesophageal reflux disease)    • Gout    • Hyperlipidemia    • Hypertension    • Hypothyroid    • Kidney stone    • Migraine    • Neuropathy    • PE (pulmonary embolism)        Allergies   Allergen Reactions   • Amoxicillin Hives   • Penicillins Hives   • Toradol [Ketorolac Tromethamine] Hives       Past Surgical History:   Procedure Laterality Date   • CARDIAC SURGERY      Heart Cath done in    •  SECTION     • CHOLECYSTECTOMY     • COLONOSCOPY         Family History   Problem Relation Age of Onset   • No Known Problems Mother    • No Known Problems Father    • No Known Problems Sister    • No Known Problems Brother    • No Known Problems Son    • No Known Problems Daughter    • No Known Problems Maternal Grandmother    • No Known Problems Maternal Grandfather    • No Known Problems Paternal Grandmother    • No Known Problems Paternal Grandfather    • No Known Problems Cousin    • Rheum arthritis Neg Hx    • Osteoarthritis Neg Hx    • Asthma Neg Hx    • Diabetes Neg Hx    • Heart failure Neg Hx    • Hyperlipidemia Neg Hx    • Hypertension Neg Hx    • Migraines Neg Hx    • Rashes / Skin problems Neg Hx    • Seizures Neg Hx    • Stroke Neg Hx    • Thyroid disease Neg Hx        Social History     Social History   • Marital status:      Social History Main Topics   • Smoking status: Never Smoker   • Smokeless tobacco: Never Used   • Alcohol use No   • Drug use: No   • Sexual activity: Defer     Other Topics Concern   • Not on file           Objective   Physical Exam   Constitutional:   Warm pink dry afebrile morbidly obese poor eye contact does not appear to be in acute distress   HENT:   Head: Normocephalic and atraumatic.   Eyes: Conjunctivae are normal. Right eye exhibits no discharge. Left eye exhibits no discharge. No scleral icterus.    Neck: Normal range of motion.   Cardiovascular: Normal rate, regular rhythm, normal heart sounds and intact distal pulses.  Exam reveals no friction rub.    No murmur heard.  Pulmonary/Chest: Effort normal and breath sounds normal. No respiratory distress. She has no wheezes.   Abdominal: Soft. Bowel sounds are normal. She exhibits no distension and no mass. There is no tenderness. There is no rebound and no guarding. No hernia.   Lymphadenopathy:     She has no cervical adenopathy.   Skin: Skin is warm. Capillary refill takes less than 2 seconds. No rash noted. No erythema. No pallor.   Psychiatric:   Flat affect poor eye contact.   Nursing note and vitals reviewed.      Procedures         ED Course  ED Course   Comment By Time   Review the patient's medical records I counted 35 CAT scans of the abdomen and pelvis and lumbar spine in the [facility] system since 2016.  This did not include CTs of the chest or C-spine.  I discussed this with the patient and told her that I was somewhat hesitant to scan her again very to do a renal ultrasound.  The urine she is given us as a clean catch urine has blood in it.  It was noted that there was a central clot of blood in the middle of the urine sample.  Upon further examination the nurse found a lancet that was still floating in the toilet that apparently did not flush.  Also review of [facility] medical records she was there this morning at 5 AM and she specifically denied this.  I discussed this with the patient that was concerning.  We requested a catheter urine told her we get a renal ultrasound she states that she is leaving. PRICE Campos 04/01 0857                  [facility]      Final diagnoses:   Right flank pain   Drug-seeking behavior            PRICE Campos  04/01/18 2276

## 2018-06-07 ENCOUNTER — HOSPITAL ENCOUNTER (EMERGENCY)
Facility: HOSPITAL | Age: 33
Discharge: HOME | End: 2018-06-07
Attending: EMERGENCY MEDICINE | Admitting: EMERGENCY MEDICINE

## 2018-06-07 ENCOUNTER — APPOINTMENT (OUTPATIENT)
Dept: CT IMAGING | Facility: HOSPITAL | Age: 33
End: 2018-06-07

## 2018-06-07 ENCOUNTER — APPOINTMENT (OUTPATIENT)
Dept: GENERAL RADIOLOGY | Facility: HOSPITAL | Age: 33
End: 2018-06-07

## 2018-06-07 VITALS
HEART RATE: 84 BPM | TEMPERATURE: 97.2 F | DIASTOLIC BLOOD PRESSURE: 77 MMHG | HEIGHT: 64 IN | OXYGEN SATURATION: 98 % | RESPIRATION RATE: 17 BRPM | BODY MASS INDEX: 50.02 KG/M2 | WEIGHT: 293 LBS | SYSTOLIC BLOOD PRESSURE: 141 MMHG

## 2018-06-07 DIAGNOSIS — R20.0 RIGHT LEG NUMBNESS: Primary | ICD-10-CM

## 2018-06-07 DIAGNOSIS — R07.9 CHEST PAIN, UNSPECIFIED TYPE: ICD-10-CM

## 2018-06-07 LAB
ALBUMIN SERPL-MCNC: 4.4 G/DL (ref 3.5–5)
ALBUMIN/GLOB SERPL: 1.5 G/DL (ref 1.5–2.5)
ALP SERPL-CCNC: 70 U/L (ref 35–104)
ALT SERPL W P-5'-P-CCNC: 53 U/L (ref 10–36)
ANION GAP SERPL CALCULATED.3IONS-SCNC: 10.3 MMOL/L (ref 3.6–11.2)
AST SERPL-CCNC: 71 U/L (ref 10–30)
BACTERIA UR QL AUTO: ABNORMAL /HPF
BASOPHILS # BLD AUTO: 0.01 10*3/MM3 (ref 0–0.3)
BASOPHILS NFR BLD AUTO: 0.2 % (ref 0–2)
BILIRUB SERPL-MCNC: 0.5 MG/DL (ref 0.2–1.8)
BILIRUB UR QL STRIP: NEGATIVE
BUN BLD-MCNC: 8 MG/DL (ref 7–21)
BUN/CREAT SERPL: 15.1 (ref 7–25)
CALCIUM SPEC-SCNC: 8.9 MG/DL (ref 7.7–10)
CHLORIDE SERPL-SCNC: 103 MMOL/L (ref 99–112)
CLARITY UR: ABNORMAL
CO2 SERPL-SCNC: 20.7 MMOL/L (ref 24.3–31.9)
COLOR UR: YELLOW
CREAT BLD-MCNC: 0.53 MG/DL (ref 0.43–1.29)
D DIMER PPP FEU-MCNC: <0.27 MCGFEU/ML (ref 0–0.5)
DEPRECATED RDW RBC AUTO: 42.3 FL (ref 37–54)
EOSINOPHIL # BLD AUTO: 0.09 10*3/MM3 (ref 0–0.7)
EOSINOPHIL NFR BLD AUTO: 1.5 % (ref 0–5)
ERYTHROCYTE [DISTWIDTH] IN BLOOD BY AUTOMATED COUNT: 13.9 % (ref 11.5–14.5)
GFR SERPL CREATININE-BSD FRML MDRD: 134 ML/MIN/1.73
GLOBULIN UR ELPH-MCNC: 2.9 GM/DL
GLUCOSE BLD-MCNC: 296 MG/DL (ref 70–110)
GLUCOSE UR STRIP-MCNC: ABNORMAL MG/DL
HCG SERPL QL: NEGATIVE
HCT VFR BLD AUTO: 35.1 % (ref 37–47)
HGB BLD-MCNC: 12 G/DL (ref 12–16)
HGB UR QL STRIP.AUTO: NEGATIVE
HOLD SPECIMEN: NORMAL
HYALINE CASTS UR QL AUTO: ABNORMAL /LPF
IMM GRANULOCYTES # BLD: 0.01 10*3/MM3 (ref 0–0.03)
IMM GRANULOCYTES NFR BLD: 0.2 % (ref 0–0.5)
INR PPP: 0.96 (ref 0.9–1.1)
KETONES UR QL STRIP: ABNORMAL
LEUKOCYTE ESTERASE UR QL STRIP.AUTO: ABNORMAL
LIPASE SERPL-CCNC: 47 U/L (ref 13–60)
LYMPHOCYTES # BLD AUTO: 1.67 10*3/MM3 (ref 1–3)
LYMPHOCYTES NFR BLD AUTO: 28.4 % (ref 21–51)
MCH RBC QN AUTO: 29.5 PG (ref 27–33)
MCHC RBC AUTO-ENTMCNC: 34.2 G/DL (ref 33–37)
MCV RBC AUTO: 86.2 FL (ref 80–94)
MONOCYTES # BLD AUTO: 0.44 10*3/MM3 (ref 0.1–0.9)
MONOCYTES NFR BLD AUTO: 7.5 % (ref 0–10)
NEUTROPHILS # BLD AUTO: 3.67 10*3/MM3 (ref 1.4–6.5)
NEUTROPHILS NFR BLD AUTO: 62.2 % (ref 30–70)
NITRITE UR QL STRIP: NEGATIVE
OSMOLALITY SERPL CALC.SUM OF ELEC: 277.5 MOSM/KG (ref 273–305)
PH UR STRIP.AUTO: <=5 [PH] (ref 5–8)
PLATELET # BLD AUTO: 227 10*3/MM3 (ref 130–400)
PMV BLD AUTO: 9.3 FL (ref 6–10)
POTASSIUM BLD-SCNC: 3.9 MMOL/L (ref 3.5–5.3)
PROT SERPL-MCNC: 7.3 G/DL (ref 6–8)
PROT UR QL STRIP: ABNORMAL
PROTHROMBIN TIME: 13 SECONDS (ref 11–15.4)
RBC # BLD AUTO: 4.07 10*6/MM3 (ref 4.2–5.4)
RBC # UR: ABNORMAL /HPF
REF LAB TEST METHOD: ABNORMAL
SODIUM BLD-SCNC: 134 MMOL/L (ref 135–153)
SP GR UR STRIP: 1.03 (ref 1–1.03)
SQUAMOUS #/AREA URNS HPF: ABNORMAL /HPF
TROPONIN I SERPL-MCNC: <0.006 NG/ML
UROBILINOGEN UR QL STRIP: ABNORMAL
WBC NRBC COR # BLD: 5.89 10*3/MM3 (ref 4.5–12.5)
WBC UR QL AUTO: ABNORMAL /HPF
WHOLE BLOOD HOLD SPECIMEN: NORMAL
WHOLE BLOOD HOLD SPECIMEN: NORMAL

## 2018-06-07 PROCEDURE — 85610 PROTHROMBIN TIME: CPT | Performed by: EMERGENCY MEDICINE

## 2018-06-07 PROCEDURE — 96361 HYDRATE IV INFUSION ADD-ON: CPT

## 2018-06-07 PROCEDURE — 70450 CT HEAD/BRAIN W/O DYE: CPT | Performed by: RADIOLOGY

## 2018-06-07 PROCEDURE — 96365 THER/PROPH/DIAG IV INF INIT: CPT

## 2018-06-07 PROCEDURE — 99285 EMERGENCY DEPT VISIT HI MDM: CPT

## 2018-06-07 PROCEDURE — 85379 FIBRIN DEGRADATION QUANT: CPT | Performed by: EMERGENCY MEDICINE

## 2018-06-07 PROCEDURE — 83690 ASSAY OF LIPASE: CPT | Performed by: EMERGENCY MEDICINE

## 2018-06-07 PROCEDURE — 93010 ELECTROCARDIOGRAM REPORT: CPT | Performed by: INTERNAL MEDICINE

## 2018-06-07 PROCEDURE — 84703 CHORIONIC GONADOTROPIN ASSAY: CPT | Performed by: EMERGENCY MEDICINE

## 2018-06-07 PROCEDURE — 85025 COMPLETE CBC W/AUTO DIFF WBC: CPT | Performed by: EMERGENCY MEDICINE

## 2018-06-07 PROCEDURE — 70450 CT HEAD/BRAIN W/O DYE: CPT

## 2018-06-07 PROCEDURE — 80053 COMPREHEN METABOLIC PANEL: CPT | Performed by: EMERGENCY MEDICINE

## 2018-06-07 PROCEDURE — 71045 X-RAY EXAM CHEST 1 VIEW: CPT

## 2018-06-07 PROCEDURE — 93005 ELECTROCARDIOGRAM TRACING: CPT | Performed by: EMERGENCY MEDICINE

## 2018-06-07 PROCEDURE — 71045 X-RAY EXAM CHEST 1 VIEW: CPT | Performed by: RADIOLOGY

## 2018-06-07 PROCEDURE — 87086 URINE CULTURE/COLONY COUNT: CPT | Performed by: EMERGENCY MEDICINE

## 2018-06-07 PROCEDURE — 81001 URINALYSIS AUTO W/SCOPE: CPT | Performed by: EMERGENCY MEDICINE

## 2018-06-07 PROCEDURE — 87040 BLOOD CULTURE FOR BACTERIA: CPT | Performed by: EMERGENCY MEDICINE

## 2018-06-07 PROCEDURE — 25010000002 ENOXAPARIN PER 10 MG: Performed by: EMERGENCY MEDICINE

## 2018-06-07 PROCEDURE — 25010000002 CEFTRIAXONE: Performed by: EMERGENCY MEDICINE

## 2018-06-07 PROCEDURE — 84484 ASSAY OF TROPONIN QUANT: CPT | Performed by: EMERGENCY MEDICINE

## 2018-06-07 PROCEDURE — 96372 THER/PROPH/DIAG INJ SC/IM: CPT

## 2018-06-07 RX ORDER — ACETAMINOPHEN 500 MG
1000 TABLET ORAL ONCE
Status: COMPLETED | OUTPATIENT
Start: 2018-06-07 | End: 2018-06-07

## 2018-06-07 RX ORDER — SODIUM CHLORIDE 0.9 % (FLUSH) 0.9 %
10 SYRINGE (ML) INJECTION AS NEEDED
Status: DISCONTINUED | OUTPATIENT
Start: 2018-06-07 | End: 2018-06-07 | Stop reason: HOSPADM

## 2018-06-07 RX ORDER — ASPIRIN 81 MG/1
324 TABLET, CHEWABLE ORAL ONCE
Status: COMPLETED | OUTPATIENT
Start: 2018-06-07 | End: 2018-06-07

## 2018-06-07 RX ORDER — OXYCODONE HYDROCHLORIDE AND ACETAMINOPHEN 5; 325 MG/1; MG/1
1 TABLET ORAL ONCE
Status: COMPLETED | OUTPATIENT
Start: 2018-06-07 | End: 2018-06-07

## 2018-06-07 RX ADMIN — ASPIRIN 324 MG: 81 TABLET, CHEWABLE ORAL at 05:57

## 2018-06-07 RX ADMIN — SODIUM CHLORIDE 1000 ML: 9 INJECTION, SOLUTION INTRAVENOUS at 03:20

## 2018-06-07 RX ADMIN — OXYCODONE HYDROCHLORIDE AND ACETAMINOPHEN 1 TABLET: 5; 325 TABLET ORAL at 09:18

## 2018-06-07 RX ADMIN — ACETAMINOPHEN 1000 MG: 500 TABLET ORAL at 06:35

## 2018-06-07 RX ADMIN — CEFTRIAXONE 1 G: 1 INJECTION, POWDER, FOR SOLUTION INTRAMUSCULAR; INTRAVENOUS at 06:10

## 2018-06-07 RX ADMIN — ENOXAPARIN SODIUM 120 MG: 60 INJECTION SUBCUTANEOUS at 06:14

## 2018-06-07 NOTE — ED NOTES
MD at bedside at this time speaking to patient and spouse at this time per spouse request. Daughter remains asleep in chair at this time. No signs or symptoms of acute distress noted. Patient visualized moving all extremities without difficulty. SANGEETA. TAMRA. SR up X 2. Call light and fall precautions in place.      Michelle Davalos RN  06/07/18 0680

## 2018-06-07 NOTE — ED NOTES
"Patient verbalizes upon arrival to the ED that she had been having chest pain for the last several hours that it was midsternal and then goes into her right arm and goes down. Verbalizes that it is more numbness in her arm than pain. Patient told MD that she has been having \"stroke like symptoms, slurred speech and numbness for six hours, also that she has missed several doses of her Coumadin.\" Patient was able to move her self from [facility] to the [facility] in the room. Patient has PERRLA. No facial droop or aphasia noted. Able to speak without difficulty. NADN. WCTM. Resps even and unlabored. Call light within reach.      Michelle Davalos RN  06/07/18 0253    "

## 2018-06-07 NOTE — ED NOTES
Report given to [facility]. No bed available, but they are being cleaned.. I will call with a room number after while     Anthony Dimas RN  06/07/18 7782

## 2018-06-07 NOTE — ED NOTES
MD is at bedside speaking with patient at this time that  did not accept patient, states that MD had told MD that she could be discharged home, states that he had spoke with [facility] who had admitted the patient, even though that the patient does not want to be transferred there. Patient is now in agreeance to go, states that she will do anything that the doctor tells her to do.      Michelle Davalos, ROB  06/07/18 0619

## 2018-06-07 NOTE — ED NOTES
Placed patient onto the bed pan at this time, encouraged patient to provide a urine specimen.      Michelle Davalos RN  06/07/18 1055

## 2018-06-07 NOTE — ED NOTES
Patient is lying on stretcher at this time. Skin warm and dry to touch. NADN. WCTM. Resps even and unlabored. Denies any complaints of chest pain at this time. Denies any needs. Updated on POC. Resps even and unlabored. Call light and fall precautions in place. Spouse and child remains at bedside.      Michelle Davalos RN  06/07/18 3291

## 2018-06-07 NOTE — ED PROVIDER NOTES
Justin   Pt is a 34 yo lady who presented to the ED with multiple complaints:    1: Patient complaining of having had some slurred speech according to her  approximately 5 or 6 hours prior to arrival and that she's having right-sided numbness.  Says she only has paresthesias in the right arm but she has right numbness in the right leg.  No weakness no vision changes no slurred speech no change in gait.  No vertigo symptoms.  The patient says the slurred speech resolved on its own.  The patient does have a history of factor V Leiden and is supposed be on Coumadin but she hasn't had it for a few days because she ran out.  No headache.  Patient's original blood pressures were read as quite high blood sugar lightheaded an ill fitting blood pressure cuff.  Blood pressure came down to 130s over 80s without treatment.    2: Patient is also complaining of some dull left-sided chest pain that has been intermittent for the last few days.  Some mild associated shortness of breath no nausea vomiting or diaphoresis.  No fever or chills. No hemoptysis. No cough. No f/c.  pain does not radiate. Nothing makes it better and nothing makes it worse. She has not noticed it associated with exertion        Illness   Associated symptoms: chest pain and shortness of breath    Associated symptoms: no abdominal pain, no fever, no headaches, no nausea and no vomiting        Review of Systems   Constitutional: Negative.  Negative for fever.   HENT: Negative.    Respiratory: Positive for shortness of breath.    Cardiovascular: Positive for chest pain. Negative for palpitations and leg swelling.   Gastrointestinal: Negative.  Negative for abdominal pain, nausea and vomiting.   Endocrine: Negative.    Genitourinary: Negative.  Negative for dysuria.   Skin: Negative.    Neurological: Positive for speech difficulty and numbness. Negative for dizziness, tremors, seizures, syncope, facial asymmetry, weakness, light-headedness and  headaches.   Psychiatric/Behavioral: Negative.    All other systems reviewed and are negative.      Past Medical History:   Diagnosis Date   • Depression    • Diabetes mellitus    • DVT (deep venous thrombosis)    • GERD (gastroesophageal reflux disease)    • Gout    • Hyperlipidemia    • Hypertension    • Hypothyroid    • Kidney stone    • Migraine    • Neuropathy    • PE (pulmonary embolism)        Allergies   Allergen Reactions   • Amoxicillin Hives   • Penicillins Hives   • Toradol [Ketorolac Tromethamine] Hives       Past Surgical History:   Procedure Laterality Date   • CARDIAC SURGERY      Heart Cath done in    •  SECTION     • CHOLECYSTECTOMY     • COLONOSCOPY         Family History   Problem Relation Age of Onset   • No Known Problems Mother    • No Known Problems Father    • No Known Problems Sister    • No Known Problems Brother    • No Known Problems Son    • No Known Problems Daughter    • No Known Problems Maternal Grandmother    • No Known Problems Maternal Grandfather    • No Known Problems Paternal Grandmother    • No Known Problems Paternal Grandfather    • No Known Problems Cousin    • Rheum arthritis Neg Hx    • Osteoarthritis Neg Hx    • Asthma Neg Hx    • Diabetes Neg Hx    • Heart failure Neg Hx    • Hyperlipidemia Neg Hx    • Hypertension Neg Hx    • Migraines Neg Hx    • Rashes / Skin problems Neg Hx    • Seizures Neg Hx    • Stroke Neg Hx    • Thyroid disease Neg Hx        Social History     Social History   • Marital status:      Social History Main Topics   • Smoking status: Never Smoker   • Smokeless tobacco: Never Used   • Alcohol use No   • Drug use: No   • Sexual activity: Defer     Other Topics Concern   • Not on file           Objective   Physical Exam   Constitutional: She is oriented to person, place, and time. She appears well-developed and well-nourished.   HENT:   Head: Normocephalic and atraumatic.   Chronic strabismus   Eyes: EOM are normal. Pupils are  equal, round, and reactive to light.   Neck: Normal range of motion. Neck supple.   Cardiovascular: Normal rate and regular rhythm.    Pulmonary/Chest: Effort normal and breath sounds normal.   Abdominal: Soft. There is no tenderness.   Neurological: She is alert and oriented to person, place, and time. A sensory deficit is present. She exhibits normal muscle tone. Coordination normal.   Chronic strabismus but otherwise cn 2-12 intact.  Pt has decreased sensation in rt leg but nomrmal sensation everywhere else. Normal speech. Normal cerebellar with normal finger to nose. NIH score of 1   Skin: Skin is warm and dry. Capillary refill takes less than 2 seconds. No erythema.   Psychiatric: She has a normal mood and affect. Her behavior is normal.   Vitals reviewed.      Procedures           ED Course  ED Course as of Jun 07 0732   Thu Jun 07, 2018   0611 Patient is not a TPA candidate due to the fact her symptoms started over 5 hours prior to arrival.  Patient has an NIH score of 1.  I discussed the patient with  the neurologist at [facility] and he agrees with giving lovenox and transfer to [facility].  [NB]   0710 Spoke with dr najera at [facility] and he accepted the pt for transfer to a tele bed. He said there would be a wait until a bed opened up. Pt will need ALS transfer at that time  [NB]   0729 Patient signed out to Dr Narayanan at shift change  [NB]      ED Course User Index  [NB] Sergei Stark MD                  MDM  Number of Diagnoses or Management Options  Chest pain, unspecified type:   Right leg numbness:      Amount and/or Complexity of Data Reviewed  Clinical lab tests: ordered and reviewed  Tests in the radiology section of CPT®: ordered and reviewed  Discuss the patient with other providers: yes          Final diagnoses:   Right leg numbness   Chest pain, unspecified type            Sergei Stark MD  06/07/18 0750

## 2018-06-07 NOTE — ED NOTES
"Contacted bed coordinator at [facility] [phone] and was told \"we are waiting for discharges before I can assign a room.family updated     Anthony Dimas RN  06/07/18 0811    "

## 2018-06-07 NOTE — ED NOTES
Patient spouse now returns to the nurses station, does not want to speak, wants to talk to MD at this time. Patient can be observed from the nurses station looking into the nurses station waiting for the provider. Spouse has been observed coming to the door multiple times and also messing with the monitor in the room.      Michelle Davalos RN  06/07/18 4917

## 2018-06-07 NOTE — ED NOTES
Patient states that she is unable to provide a urine specimen at this time, states that if she needs to provide one at this time that she doesn't care to be straight cathed. Informed that she can attempt after the fluids have infused.      Michelle Davalos RN  06/07/18 8075

## 2018-06-07 NOTE — ED NOTES
Patient verbalizes to MD who is at bedside states that she does not want to go to [facility], states that she prefers to go to . Patient CT scan noted to be negative at this time.      Michelle Davalos RN  06/07/18 0606

## 2018-06-07 NOTE — ED NOTES
Spouse approached the nurses states once again stating that the patient is hurting all over, made MD aware as well as spouse once again that MD is not going to prescribe anything for the patient at this time for pain. Patient reports that she is still having pain on the right side of her neck, arm and leg where she had complained of numbness.      Michelle Davalos RN  06/07/18 5390

## 2018-06-07 NOTE — ED NOTES
Patient states that she is numb from her right side of her head down all the way to her toes. Patient states that the pain is unbearable. Patient is able to move extremities without difficulty. No facial droop or slurred speech noted. Resps even and unlabored. Equal  bilaterally. Sensation noted in all extremities. Made patient aware that nurse would update the MD.     Michelle Davalos RN  06/07/18 5847

## 2018-06-07 NOTE — ED NOTES
Entered patient room at this time, states that she is hurting all over. Updated patient once again that MD was aware that patient was in pain, but verbalizes that he is not worried about the pain at this time, awaiting labs and CT results. No complaints of numbness voiced at this time. Fall precautions in place. Call light within reach.      Michelle Davalos RN  06/07/18 5495

## 2018-06-07 NOTE — ED NOTES
"Pt and  decided to go home. \"i feel better, I gort scared because my grandfather had a stroke.\" I explained patient's rights and she promised to return if symptoms/concerns returned. Doctor notified and spoken to. Pt alert and stable upon AMA form signed     Anthony Dimas RN  06/07/18 1015    "

## 2018-06-07 NOTE — ED NOTES
Report gave to ANALI Mcneil RN, made aware that patient is still waiting room assignment at [facility].      Michelle Davalos RN  06/07/18 6343

## 2018-06-09 LAB — BACTERIA SPEC AEROBE CULT: NORMAL

## 2018-06-12 LAB
BACTERIA SPEC AEROBE CULT: NORMAL
BACTERIA SPEC AEROBE CULT: NORMAL

## 2018-09-14 ENCOUNTER — APPOINTMENT (OUTPATIENT)
Dept: CT IMAGING | Facility: HOSPITAL | Age: 33
End: 2018-09-14
Attending: EMERGENCY MEDICINE

## 2018-09-14 ENCOUNTER — HOSPITAL ENCOUNTER (EMERGENCY)
Facility: HOSPITAL | Age: 33
Discharge: HOME | End: 2018-09-14
Attending: EMERGENCY MEDICINE | Admitting: EMERGENCY MEDICINE

## 2018-09-14 ENCOUNTER — APPOINTMENT (OUTPATIENT)
Dept: ULTRASOUND IMAGING | Facility: HOSPITAL | Age: 33
End: 2018-09-14

## 2018-09-14 VITALS
HEIGHT: 64 IN | TEMPERATURE: 97.9 F | BODY MASS INDEX: 50.02 KG/M2 | DIASTOLIC BLOOD PRESSURE: 87 MMHG | WEIGHT: 293 LBS | RESPIRATION RATE: 18 BRPM | SYSTOLIC BLOOD PRESSURE: 160 MMHG | OXYGEN SATURATION: 95 % | HEART RATE: 92 BPM

## 2018-09-14 DIAGNOSIS — R10.9 FLANK PAIN: Primary | ICD-10-CM

## 2018-09-14 LAB
ALBUMIN SERPL-MCNC: 4.6 G/DL (ref 3.5–5)
ALBUMIN/GLOB SERPL: 1.5 G/DL (ref 1–2)
ALP SERPL-CCNC: 58 U/L (ref 38–126)
ALT SERPL W P-5'-P-CCNC: 38 U/L (ref 13–69)
ANION GAP SERPL CALCULATED.3IONS-SCNC: 17.8 MMOL/L (ref 10–20)
AST SERPL-CCNC: 44 U/L (ref 15–46)
B-HCG UR QL: NEGATIVE
BACTERIA UR QL AUTO: ABNORMAL /HPF
BASOPHILS # BLD AUTO: 0.04 10*3/MM3 (ref 0–0.2)
BASOPHILS NFR BLD AUTO: 0.7 % (ref 0–2.5)
BILIRUB SERPL-MCNC: 0.3 MG/DL (ref 0.2–1.3)
BILIRUB UR QL STRIP: NEGATIVE
BUN BLD-MCNC: 9 MG/DL (ref 7–20)
BUN/CREAT SERPL: 15 (ref 7.1–23.5)
CALCIUM SPEC-SCNC: 9.2 MG/DL (ref 8.4–10.2)
CHLORIDE SERPL-SCNC: 109 MMOL/L (ref 98–107)
CLARITY UR: ABNORMAL
CO2 SERPL-SCNC: 22 MMOL/L (ref 26–30)
COLOR UR: ABNORMAL
CREAT BLD-MCNC: 0.6 MG/DL (ref 0.6–1.3)
DEPRECATED RDW RBC AUTO: 55.8 FL (ref 37–54)
EOSINOPHIL # BLD AUTO: 0.13 10*3/MM3 (ref 0–0.7)
EOSINOPHIL NFR BLD AUTO: 2.1 % (ref 0–7)
ERYTHROCYTE [DISTWIDTH] IN BLOOD BY AUTOMATED COUNT: 16.7 % (ref 11.5–14.5)
GFR SERPL CREATININE-BSD FRML MDRD: 116 ML/MIN/1.73
GLOBULIN UR ELPH-MCNC: 3 GM/DL
GLUCOSE BLD-MCNC: 152 MG/DL (ref 74–98)
GLUCOSE UR STRIP-MCNC: NEGATIVE MG/DL
HCT VFR BLD AUTO: 37 % (ref 37–47)
HGB BLD-MCNC: 12.2 G/DL (ref 12–16)
HGB UR QL STRIP.AUTO: ABNORMAL
HOLD SPECIMEN: NORMAL
HOLD SPECIMEN: NORMAL
HYALINE CASTS UR QL AUTO: ABNORMAL /LPF
IMM GRANULOCYTES # BLD: 0.01 10*3/MM3 (ref 0–0.06)
IMM GRANULOCYTES NFR BLD: 0.2 % (ref 0–0.6)
INR PPP: 2.94 (ref 0.9–1.1)
KETONES UR QL STRIP: NEGATIVE
LEUKOCYTE ESTERASE UR QL STRIP.AUTO: NEGATIVE
LIPASE SERPL-CCNC: 63 U/L (ref 23–300)
LYMPHOCYTES # BLD AUTO: 2.57 10*3/MM3 (ref 0.6–3.4)
LYMPHOCYTES NFR BLD AUTO: 42 % (ref 10–50)
MCH RBC QN AUTO: 30 PG (ref 27–31)
MCHC RBC AUTO-ENTMCNC: 33 G/DL (ref 30–37)
MCV RBC AUTO: 90.9 FL (ref 81–99)
MONOCYTES # BLD AUTO: 0.52 10*3/MM3 (ref 0–0.9)
MONOCYTES NFR BLD AUTO: 8.5 % (ref 0–12)
NEUTROPHILS # BLD AUTO: 2.85 10*3/MM3 (ref 2–6.9)
NEUTROPHILS NFR BLD AUTO: 46.5 % (ref 37–80)
NITRITE UR QL STRIP: NEGATIVE
NRBC BLD MANUAL-RTO: 0 /100 WBC (ref 0–0)
PH UR STRIP.AUTO: <=5 [PH] (ref 5–8)
PLATELET # BLD AUTO: 258 10*3/MM3 (ref 130–400)
PMV BLD AUTO: 9 FL (ref 6–12)
POTASSIUM BLD-SCNC: 3.8 MMOL/L (ref 3.5–5.1)
PROT SERPL-MCNC: 7.6 G/DL (ref 6.3–8.2)
PROT UR QL STRIP: NEGATIVE
PROTHROMBIN TIME: 32.2 SECONDS (ref 9.3–12.1)
RBC # BLD AUTO: 4.07 10*6/MM3 (ref 4.2–5.4)
RBC # UR: ABNORMAL /HPF
REF LAB TEST METHOD: ABNORMAL
SODIUM BLD-SCNC: 145 MMOL/L (ref 137–145)
SP GR UR STRIP: >=1.03 (ref 1–1.03)
SQUAMOUS #/AREA URNS HPF: ABNORMAL /HPF
UROBILINOGEN UR QL STRIP: ABNORMAL
WBC NRBC COR # BLD: 6.12 10*3/MM3 (ref 4.8–10.8)
WBC UR QL AUTO: ABNORMAL /HPF
WHOLE BLOOD HOLD SPECIMEN: NORMAL
WHOLE BLOOD HOLD SPECIMEN: NORMAL

## 2018-09-14 PROCEDURE — 99283 EMERGENCY DEPT VISIT LOW MDM: CPT

## 2018-09-14 PROCEDURE — 81025 URINE PREGNANCY TEST: CPT

## 2018-09-14 PROCEDURE — 74176 CT ABD & PELVIS W/O CONTRAST: CPT

## 2018-09-14 PROCEDURE — 85610 PROTHROMBIN TIME: CPT | Performed by: EMERGENCY MEDICINE

## 2018-09-14 PROCEDURE — 96361 HYDRATE IV INFUSION ADD-ON: CPT

## 2018-09-14 PROCEDURE — 85025 COMPLETE CBC W/AUTO DIFF WBC: CPT

## 2018-09-14 PROCEDURE — 83690 ASSAY OF LIPASE: CPT

## 2018-09-14 PROCEDURE — 25010000002 MORPHINE PER 10 MG: Performed by: EMERGENCY MEDICINE

## 2018-09-14 PROCEDURE — 96375 TX/PRO/DX INJ NEW DRUG ADDON: CPT

## 2018-09-14 PROCEDURE — 76830 TRANSVAGINAL US NON-OB: CPT

## 2018-09-14 PROCEDURE — 80053 COMPREHEN METABOLIC PANEL: CPT

## 2018-09-14 PROCEDURE — 96374 THER/PROPH/DIAG INJ IV PUSH: CPT

## 2018-09-14 PROCEDURE — 25010000002 HYDROMORPHONE PER 4 MG: Performed by: EMERGENCY MEDICINE

## 2018-09-14 PROCEDURE — 25010000002 ONDANSETRON PER 1 MG: Performed by: EMERGENCY MEDICINE

## 2018-09-14 PROCEDURE — 81001 URINALYSIS AUTO W/SCOPE: CPT | Performed by: EMERGENCY MEDICINE

## 2018-09-14 RX ORDER — HALOPERIDOL 5 MG/ML
2 INJECTION INTRAMUSCULAR ONCE
Status: DISCONTINUED | OUTPATIENT
Start: 2018-09-14 | End: 2018-09-14 | Stop reason: HOSPADM

## 2018-09-14 RX ORDER — SODIUM CHLORIDE 0.9 % (FLUSH) 0.9 %
10 SYRINGE (ML) INJECTION AS NEEDED
Status: DISCONTINUED | OUTPATIENT
Start: 2018-09-14 | End: 2018-09-14 | Stop reason: HOSPADM

## 2018-09-14 RX ORDER — TRAMADOL HYDROCHLORIDE 50 MG/1
50 TABLET ORAL EVERY 8 HOURS PRN
Qty: 6 TABLET | Refills: 0 | Status: SHIPPED | OUTPATIENT
Start: 2018-09-14 | End: 2019-04-25

## 2018-09-14 RX ORDER — ONDANSETRON 2 MG/ML
4 INJECTION INTRAMUSCULAR; INTRAVENOUS ONCE
Status: COMPLETED | OUTPATIENT
Start: 2018-09-14 | End: 2018-09-14

## 2018-09-14 RX ORDER — MORPHINE SULFATE 4 MG/ML
4 INJECTION, SOLUTION INTRAMUSCULAR; INTRAVENOUS ONCE
Status: COMPLETED | OUTPATIENT
Start: 2018-09-14 | End: 2018-09-14

## 2018-09-14 RX ADMIN — MORPHINE SULFATE 4 MG: 4 INJECTION, SOLUTION INTRAMUSCULAR; INTRAVENOUS at 11:29

## 2018-09-14 RX ADMIN — ONDANSETRON 4 MG: 2 INJECTION INTRAMUSCULAR; INTRAVENOUS at 11:29

## 2018-09-14 RX ADMIN — LIDOCAINE HYDROCHLORIDE 150 MG: 10 INJECTION, SOLUTION INFILTRATION; PERINEURAL at 11:57

## 2018-09-14 RX ADMIN — SODIUM CHLORIDE 1000 ML: 9 INJECTION, SOLUTION INTRAVENOUS at 11:29

## 2018-09-14 RX ADMIN — HYDROMORPHONE HYDROCHLORIDE 1 MG: 1 INJECTION, SOLUTION INTRAMUSCULAR; INTRAVENOUS; SUBCUTANEOUS at 12:15

## 2018-09-14 NOTE — ED PROVIDER NOTES
TRIAGE CHIEF COMPLAINT:     Nursing and triage notes reviewed    Chief Complaint   Patient presents with   • Flank Pain      HPI: Natalia Arzate is a 33 y.o. female who presents to the emergency department complaining of right-sided flank pain.  Patient describes a 3 day history of sharp stabbing right flank pain radiating around to her right groin.  Patient has had associated nausea with several episodes of nonbilious emesis.  She denies dysuria or hematuria.  Denies fevers or chills.  No diarrhea.  Patient has a history of kidney stones and states that this feels similar.  She denies chest pain or shortness of breath.     REVIEW OF SYSTEMS: All other systems reviewed and are negative     PAST MEDICAL HISTORY:   Past Medical History:   Diagnosis Date   • Depression    • Diabetes mellitus (CMS/HCC)    • DVT (deep venous thrombosis) (CMS/HCC)    • GERD (gastroesophageal reflux disease)    • Gout    • Hyperlipidemia    • Hypertension    • Hypothyroid    • Kidney stone    • Migraine    • Neuropathy    • PE (pulmonary embolism)         FAMILY HISTORY:   Family History   Problem Relation Age of Onset   • No Known Problems Mother    • No Known Problems Father    • No Known Problems Sister    • No Known Problems Brother    • No Known Problems Son    • No Known Problems Daughter    • No Known Problems Maternal Grandmother    • No Known Problems Maternal Grandfather    • No Known Problems Paternal Grandmother    • No Known Problems Paternal Grandfather    • No Known Problems Cousin    • Rheum arthritis Neg Hx    • Osteoarthritis Neg Hx    • Asthma Neg Hx    • Diabetes Neg Hx    • Heart failure Neg Hx    • Hyperlipidemia Neg Hx    • Hypertension Neg Hx    • Migraines Neg Hx    • Rashes / Skin problems Neg Hx    • Seizures Neg Hx    • Stroke Neg Hx    • Thyroid disease Neg Hx         SOCIAL HISTORY:   Social History     Social History   • Marital status:      Spouse name: N/A   • Number of children: N/A   • Years of  education: N/A     Occupational History   • Not on file.     Social History Main Topics   • Smoking status: Never Smoker   • Smokeless tobacco: Never Used   • Alcohol use No   • Drug use: No   • Sexual activity: Defer     Other Topics Concern   • Not on file     Social History Narrative   • No narrative on file        SURGICAL HISTORY:   Past Surgical History:   Procedure Laterality Date   • CARDIAC SURGERY      Heart Cath done in    •  SECTION     • CHOLECYSTECTOMY     • COLONOSCOPY          CURRENT MEDICATIONS:      Medication List      ASK your doctor about these medications    albuterol 108 (90 Base) MCG/ACT inhaler  Commonly known as:  PROVENTIL HFA;VENTOLIN HFA     allopurinol 300 MG tablet  Commonly known as:  ZYLOPRIM     aspirin 81 MG EC tablet     azelastine 0.1 % nasal spray  Commonly known as:  ASTELIN     buPROPion  MG 12 hr tablet  Commonly known as:  WELLBUTRIN SR     * cephalexin 500 MG capsule  Commonly known as:  KEFLEX  Take 1 capsule by mouth 2 (Two) Times a Day.     * cephalexin 500 MG capsule  Commonly known as:  KEFLEX  Take 1 capsule by mouth 2 (Two) Times a Day.     cetirizine 10 MG tablet  Commonly known as:  zyrTEC     cholecalciferol 1000 units tablet  Commonly known as:  VITAMIN D3     cyclobenzaprine 10 MG tablet  Commonly known as:  FLEXERIL  Take 1 tablet by mouth 3 (Three) Times a Day As Needed for muscle spasms.     diclofenac 75 MG EC tablet  Commonly known as:  VOLTAREN  Take 1 tablet by mouth 2 (Two) Times a Day.     fish oil 1000 MG capsule capsule     FLUoxetine 20 MG capsule  Commonly known as:  PROzac     fluticasone 50 MCG/ACT nasal spray  Commonly known as:  FLONASE     folic acid 1 MG tablet  Commonly known as:  FOLVITE     gabapentin 800 MG tablet  Commonly known as:  NEURONTIN     glimepiride 2 MG tablet  Commonly known as:  AMARYL     guaifenesin-dextromethorphan  MG tablet sustained-release 12 hour   tablet  Take 2 tablets by mouth Every 12  (Twelve) Hours.     hydrochlorothiazide 25 MG tablet  Commonly known as:  HYDRODIURIL     hydrOXYzine 25 MG tablet  Commonly known as:  ATARAX     levothyroxine 25 MCG tablet  Commonly known as:  SYNTHROID, LEVOTHROID     meclizine 12.5 MG tablet  Commonly known as:  ANTIVERT     meloxicam 15 MG tablet  Commonly known as:  MOBIC  Take 1 tablet by mouth Daily.     methocarbamol 750 MG tablet  Commonly known as:  ROBAXIN     metoprolol succinate XL 25 MG 24 hr tablet  Commonly known as:  TOPROL-XL     miconazole 2 % vaginal cream  Commonly known as:  MICOTIN  Insert 1 applicator into the vagina Every Night.     mometasone-formoterol 200-5 MCG/ACT inhaler  Commonly known as:  DULERA 200     montelukast 10 MG tablet  Commonly known as:  SINGULAIR     MULTIVITAMIN ADULTS PO     ondansetron 4 MG tablet  Commonly known as:  ZOFRAN  Take 1 tablet by mouth 4 (Four) Times a Day As Needed for nausea or   vomiting.     ondansetron ODT 4 MG disintegrating tablet  Commonly known as:  ZOFRAN-ODT  Take 1 tablet by mouth Every 6 (Six) Hours As Needed for Nausea.     oxyCODONE-acetaminophen 5-325 MG per tablet  Commonly known as:  PERCOCET  Take 1 tablet by mouth Every 4 (Four) Hours As Needed for Severe Pain .     phenazopyridine 200 MG tablet  Commonly known as:  PYRIDIUM  Take 1 tablet by mouth 3 (Three) Times a Day As Needed for bladder spasms.     raNITIdine 150 MG tablet  Commonly known as:  ZANTAC     SUMAtriptan 100 MG tablet  Commonly known as:  IMITREX     tamsulosin 0.4 MG capsule 24 hr capsule  Commonly known as:  FLOMAX  Take 1 capsule by mouth Daily.     topiramate 100 MG tablet  Commonly known as:  TOPAMAX     vitamin B-12 500 MCG tablet  Commonly known as:  CYANOCOBALAMIN     warfarin 10 MG tablet  Commonly known as:  COUMADIN        * This list has 2 medication(s) that are the same as other medications   prescribed for you. Read the directions carefully, and ask your doctor or   other care provider to review them  with you.               ALLERGIES: Amoxicillin; Penicillins; and Toradol [ketorolac tromethamine]     PHYSICAL EXAM:   VITAL SIGNS:   Vitals:    09/14/18 1053   BP: 160/87   Pulse: 92   Resp: 18   Temp: 97.9 °F (36.6 °C)   SpO2: 95%      CONSTITUTIONAL: Awake, oriented, appears non-toxic   HENT: Atraumatic, normocephalic, oral mucosa pink and moist, airway patent.   EYES: Conjunctiva clear   NECK: Trachea midline  CARDIOVASCULAR: Normal heart rate, Normal rhythm, No murmurs, rubs, gallops   PULMONARY/CHEST: Clear to auscultation, no rhonchi, wheezes, or rales. Symmetrical breath sounds.  ABDOMINAL: Non-distended, soft, moderate right flank tenderness - no rebound or guarding.  NEUROLOGIC: Non-focal, moving all four extremities, no gross sensory or motor deficits.   EXTREMITIES: No clubbing, cyanosis, or edema   SKIN: Warm, Dry, No erythema, No rash     ED COURSE / MEDICAL DECISION MAKING:   Natalia Arzate is a 33 y.o. female who presents to the emergency department for evaluation of right flank pain.  Patient appears mildly uncomfortable on arrival but is nondistressed.  Exam reveals some mild flank tenderness.  Vitals are stable on arrival.  We'll obtain CT scan and labs for further evaluation.  Laboratory test revealed hematuria but no obvious sign of urinary infection.  Other labs are unremarkable.  CT scan reveals an adnexal mass measuring 5.2 cm.  This was present on previous images and patient states she is seeing OB/GYN for evaluation of this.  Patient also states that she has a urology appointment on Monday, 3 days from now.  Patient has been in the emergency department multiple times in the past for similar symptoms.  The CT scan did not reveal any obvious kidney stone.  There is no obvious sign of infection.  I don't feel that further workup currently is indicated.  Did treat patient's pain in the emergency department and will continue to treat symptomatically as an outpatient.  Return precautions were  discussed.    DECISION TO DISCHARGE/ADMIT: see ED care timeline     FINAL IMPRESSION:   1 -- flank pain   2 -- hematuria  3 --     Electronically signed by: Kendal Andre MD, 9/14/2018 11:21 AM       Kendal Andre MD  09/14/18 3234    Addendum: And did obtain a pelvic ultrasound which redemonstrated large cystic mass more in appearance to previous.     Kendal Andre MD  09/14/18 0009

## 2018-09-20 ENCOUNTER — HOSPITAL ENCOUNTER (EMERGENCY)
Facility: HOSPITAL | Age: 33
Discharge: HOME | End: 2018-09-20
Attending: EMERGENCY MEDICINE | Admitting: EMERGENCY MEDICINE

## 2018-09-20 VITALS
SYSTOLIC BLOOD PRESSURE: 157 MMHG | OXYGEN SATURATION: 96 % | DIASTOLIC BLOOD PRESSURE: 95 MMHG | HEIGHT: 64 IN | RESPIRATION RATE: 18 BRPM | HEART RATE: 80 BPM | TEMPERATURE: 97.6 F | WEIGHT: 293 LBS | BODY MASS INDEX: 50.02 KG/M2

## 2018-09-20 DIAGNOSIS — R10.9 RIGHT FLANK PAIN: Primary | ICD-10-CM

## 2018-09-20 LAB
BACTERIA UR QL AUTO: ABNORMAL /HPF
BILIRUB UR QL STRIP: ABNORMAL
CLARITY UR: ABNORMAL
COLOR UR: ABNORMAL
GLUCOSE UR STRIP-MCNC: NEGATIVE MG/DL
HGB UR QL STRIP.AUTO: ABNORMAL
HYALINE CASTS UR QL AUTO: ABNORMAL /LPF
KETONES UR QL STRIP: ABNORMAL
LEUKOCYTE ESTERASE UR QL STRIP.AUTO: NEGATIVE
NITRITE UR QL STRIP: NEGATIVE
PH UR STRIP.AUTO: 5.5 [PH] (ref 5–8)
PROT UR QL STRIP: ABNORMAL
RBC # UR: ABNORMAL /HPF
REF LAB TEST METHOD: ABNORMAL
SP GR UR STRIP: >=1.03 (ref 1–1.03)
SQUAMOUS #/AREA URNS HPF: ABNORMAL /HPF
UROBILINOGEN UR QL STRIP: ABNORMAL
WBC UR QL AUTO: ABNORMAL /HPF

## 2018-09-20 PROCEDURE — 99283 EMERGENCY DEPT VISIT LOW MDM: CPT

## 2018-09-20 PROCEDURE — 81001 URINALYSIS AUTO W/SCOPE: CPT | Performed by: EMERGENCY MEDICINE

## 2018-09-20 RX ORDER — OXYCODONE HYDROCHLORIDE AND ACETAMINOPHEN 5; 325 MG/1; MG/1
1 TABLET ORAL ONCE
Status: COMPLETED | OUTPATIENT
Start: 2018-09-20 | End: 2018-09-20

## 2018-09-20 RX ADMIN — OXYCODONE HYDROCHLORIDE AND ACETAMINOPHEN 1 TABLET: 5; 325 TABLET ORAL at 21:47

## 2018-09-21 NOTE — ED PROVIDER NOTES
Subjective   History of Present Illness   33-year-old female with a history of prior PEs on warfarin, known right ovarian cyst, right renal stones presenting with right flank pain.  Describes sharp, stabbing right flank pain radiating into her groin.  States this is going on for over a week.  Initially, she states that she is only taking Tylenol for the pain, as blood in her urine as well as pain when she urinates, nausea, and 2 episodes of vomiting.  She did not mention being seen for this previously.  However, she was seen 6 days ago for similar symptoms and had workup including lab work, CT scan of her abdomen and pelvis, ultrasound of her pelvis which was remarkable for redemonstration of her complex ovarian cyst, minimal left nephrolithiasis without evidence of any obstructing stone, no evidence of any right-sided renal stone.  She states that her symptoms have not really changed since then.  She did have follow-up with urology on Monday but states that she was unable to go.  She was prescribed tramadol states she was unable to fill this because it cost too much.  She still has this prescription.    Review of Systems   Genitourinary: Positive for dysuria, flank pain and hematuria.   All other systems reviewed and are negative.      Past Medical History:   Diagnosis Date   • Depression    • Diabetes mellitus (CMS/HCC)    • DVT (deep venous thrombosis) (CMS/HCC)    • GERD (gastroesophageal reflux disease)    • Gout    • Hyperlipidemia    • Hypertension    • Hypothyroid    • Kidney stone    • Migraine    • Neuropathy    • PE (pulmonary embolism)        Allergies   Allergen Reactions   • Amoxicillin Hives   • Penicillins Hives   • Toradol [Ketorolac Tromethamine] Hives       Past Surgical History:   Procedure Laterality Date   • CARDIAC SURGERY      Heart Cath done in    •  SECTION     • CHOLECYSTECTOMY     • COLONOSCOPY         Family History   Problem Relation Age of Onset   • No Known Problems Mother     • No Known Problems Father    • No Known Problems Sister    • No Known Problems Brother    • No Known Problems Son    • No Known Problems Daughter    • No Known Problems Maternal Grandmother    • No Known Problems Maternal Grandfather    • No Known Problems Paternal Grandmother    • No Known Problems Paternal Grandfather    • No Known Problems Cousin    • Rheum arthritis Neg Hx    • Osteoarthritis Neg Hx    • Asthma Neg Hx    • Diabetes Neg Hx    • Heart failure Neg Hx    • Hyperlipidemia Neg Hx    • Hypertension Neg Hx    • Migraines Neg Hx    • Rashes / Skin problems Neg Hx    • Seizures Neg Hx    • Stroke Neg Hx    • Thyroid disease Neg Hx        Social History     Social History   • Marital status:      Social History Main Topics   • Smoking status: Never Smoker   • Smokeless tobacco: Never Used   • Alcohol use No   • Drug use: No   • Sexual activity: Defer     Other Topics Concern   • Not on file           Objective   Physical Exam   Constitutional: She is oriented to person, place, and time. She appears well-developed and well-nourished. No distress.   Morbidly obese   HENT:   Head: Normocephalic.   Mouth/Throat: Oropharynx is clear and moist. No oropharyngeal exudate.   Eyes: No scleral icterus.   Neck: Neck supple. No tracheal deviation present.   Cardiovascular: Normal rate, regular rhythm, normal heart sounds and intact distal pulses.  Exam reveals no gallop and no friction rub.    No murmur heard.  Pulmonary/Chest: Effort normal and breath sounds normal. No stridor. No respiratory distress. She has no wheezes. She has no rales. She exhibits no tenderness.   Abdominal: Soft. She exhibits no distension and no mass. There is no tenderness (mild RLQ). There is no rebound and no guarding.   Genitourinary:   Genitourinary Comments: Mild R CVA TTP   Musculoskeletal: She exhibits no edema or deformity.   Neurological: She is alert and oriented to person, place, and time.   Skin: Skin is warm and dry.  She is not diaphoretic. No erythema. No pallor.   Psychiatric: She has a normal mood and affect. Her behavior is normal.   Nursing note and vitals reviewed.      Procedures           ED Course                  MDM   33-year-old female here with right flank pain to the groin with nausea and vomiting.  Has known complex right ovarian cyst and follow-up scheduled with OB/GYN.  She just had a complete workup for this done 6 days ago which was relatively reassuring.  At this time, her main complaint is of pain control.  I will give her pain control here, recheck the urine for evidence of a UTI, and reassess.    10:02 PM urinalysis shows blood and ketones but no evidence of infection.  Even patient a Percocet and will recommend she call to follow-up with OB/GYN as soon as possible.  Encouraged to fill her prescription for tramadol.  We'll also give a prescription for Zofran.      10:10 PM Pt defers zofran script and agrees to follow up. Discussed return to care precautions.     Final diagnoses:   Right flank pain            Panda Flynn MD  09/20/18 1424

## 2018-10-12 ENCOUNTER — HOSPITAL ENCOUNTER (EMERGENCY)
Facility: HOSPITAL | Age: 33
Discharge: HOME | End: 2018-10-12
Attending: EMERGENCY MEDICINE | Admitting: FAMILY MEDICINE

## 2018-10-12 VITALS
WEIGHT: 293 LBS | HEIGHT: 64 IN | HEART RATE: 92 BPM | TEMPERATURE: 97.5 F | BODY MASS INDEX: 50.02 KG/M2 | DIASTOLIC BLOOD PRESSURE: 90 MMHG | OXYGEN SATURATION: 97 % | SYSTOLIC BLOOD PRESSURE: 144 MMHG | RESPIRATION RATE: 16 BRPM

## 2018-10-12 DIAGNOSIS — R31.9 HEMATURIA, UNSPECIFIED TYPE: Primary | ICD-10-CM

## 2018-10-12 DIAGNOSIS — N30.01 ACUTE CYSTITIS WITH HEMATURIA: ICD-10-CM

## 2018-10-12 DIAGNOSIS — N20.0 KIDNEY STONE: ICD-10-CM

## 2018-10-12 LAB
6-ACETYL MORPHINE: NEGATIVE
AMPHET+METHAMPHET UR QL: NEGATIVE
BACTERIA UR QL AUTO: ABNORMAL /HPF
BARBITURATES UR QL SCN: NEGATIVE
BENZODIAZ UR QL SCN: NEGATIVE
BILIRUB UR QL STRIP: ABNORMAL
BUPRENORPHINE SERPL-MCNC: NEGATIVE NG/ML
CANNABINOIDS SERPL QL: NEGATIVE
CLARITY UR: ABNORMAL
COCAINE UR QL: NEGATIVE
COLOR UR: ABNORMAL
GLUCOSE UR STRIP-MCNC: NEGATIVE MG/DL
HGB UR QL STRIP.AUTO: ABNORMAL
HYALINE CASTS UR QL AUTO: ABNORMAL /LPF
INR PPP: 2.69 (ref 0.9–1.1)
KETONES UR QL STRIP: ABNORMAL
LEUKOCYTE ESTERASE UR QL STRIP.AUTO: ABNORMAL
METHADONE UR QL SCN: NEGATIVE
MUCOUS THREADS URNS QL MICRO: ABNORMAL /HPF
NITRITE UR QL STRIP: NEGATIVE
OPIATES UR QL: POSITIVE
OXYCODONE UR QL SCN: POSITIVE
PCP UR QL SCN: NEGATIVE
PH UR STRIP.AUTO: 6 [PH] (ref 5–8)
PROT UR QL STRIP: ABNORMAL
PROTHROMBIN TIME: 28.9 SECONDS (ref 11–15.4)
RBC # UR: ABNORMAL /HPF
REF LAB TEST METHOD: ABNORMAL
SP GR UR STRIP: 1.02 (ref 1–1.03)
SQUAMOUS #/AREA URNS HPF: ABNORMAL /HPF
TROPONIN I SERPL-MCNC: <0.006 NG/ML
UROBILINOGEN UR QL STRIP: ABNORMAL
WBC UR QL AUTO: ABNORMAL /HPF

## 2018-10-12 PROCEDURE — 25010000002 DIPHENHYDRAMINE PER 50 MG: Performed by: FAMILY MEDICINE

## 2018-10-12 PROCEDURE — 81001 URINALYSIS AUTO W/SCOPE: CPT | Performed by: FAMILY MEDICINE

## 2018-10-12 PROCEDURE — 96361 HYDRATE IV INFUSION ADD-ON: CPT

## 2018-10-12 PROCEDURE — 25010000002 ONDANSETRON PER 1 MG: Performed by: FAMILY MEDICINE

## 2018-10-12 PROCEDURE — 80307 DRUG TEST PRSMV CHEM ANLYZR: CPT | Performed by: FAMILY MEDICINE

## 2018-10-12 PROCEDURE — 96375 TX/PRO/DX INJ NEW DRUG ADDON: CPT

## 2018-10-12 PROCEDURE — 93005 ELECTROCARDIOGRAM TRACING: CPT | Performed by: FAMILY MEDICINE

## 2018-10-12 PROCEDURE — 96374 THER/PROPH/DIAG INJ IV PUSH: CPT

## 2018-10-12 PROCEDURE — 84484 ASSAY OF TROPONIN QUANT: CPT | Performed by: FAMILY MEDICINE

## 2018-10-12 PROCEDURE — 25010000002 ORPHENADRINE CITRATE PER 60 MG: Performed by: FAMILY MEDICINE

## 2018-10-12 PROCEDURE — 99284 EMERGENCY DEPT VISIT MOD MDM: CPT

## 2018-10-12 PROCEDURE — 93010 ELECTROCARDIOGRAM REPORT: CPT | Performed by: INTERNAL MEDICINE

## 2018-10-12 PROCEDURE — 85610 PROTHROMBIN TIME: CPT | Performed by: FAMILY MEDICINE

## 2018-10-12 RX ORDER — NITROFURANTOIN 25; 75 MG/1; MG/1
100 CAPSULE ORAL EVERY 12 HOURS SCHEDULED
Status: COMPLETED | OUTPATIENT
Start: 2018-10-12 | End: 2018-10-12

## 2018-10-12 RX ORDER — HYDROCODONE BITARTRATE AND ACETAMINOPHEN 5; 325 MG/1; MG/1
1 TABLET ORAL ONCE
Status: COMPLETED | OUTPATIENT
Start: 2018-10-12 | End: 2018-10-12

## 2018-10-12 RX ORDER — ORPHENADRINE CITRATE 30 MG/ML
30 INJECTION INTRAMUSCULAR; INTRAVENOUS ONCE
Status: COMPLETED | OUTPATIENT
Start: 2018-10-12 | End: 2018-10-12

## 2018-10-12 RX ORDER — ONDANSETRON 2 MG/ML
4 INJECTION INTRAMUSCULAR; INTRAVENOUS ONCE
Status: COMPLETED | OUTPATIENT
Start: 2018-10-12 | End: 2018-10-12

## 2018-10-12 RX ORDER — DIPHENHYDRAMINE HYDROCHLORIDE 50 MG/ML
25 INJECTION INTRAMUSCULAR; INTRAVENOUS ONCE
Status: COMPLETED | OUTPATIENT
Start: 2018-10-12 | End: 2018-10-12

## 2018-10-12 RX ORDER — ONDANSETRON 4 MG/1
4 TABLET, FILM COATED ORAL EVERY 6 HOURS PRN
Qty: 30 TABLET | Refills: 0 | Status: SHIPPED | OUTPATIENT
Start: 2018-10-12 | End: 2019-04-25

## 2018-10-12 RX ORDER — NITROFURANTOIN 25; 75 MG/1; MG/1
CAPSULE ORAL
Status: COMPLETED
Start: 2018-10-12 | End: 2018-10-12

## 2018-10-12 RX ORDER — TAMSULOSIN HYDROCHLORIDE 0.4 MG/1
0.4 CAPSULE ORAL ONCE
Status: COMPLETED | OUTPATIENT
Start: 2018-10-12 | End: 2018-10-12

## 2018-10-12 RX ORDER — LIDOCAINE 50 MG/G
1 PATCH TOPICAL
Status: DISCONTINUED | OUTPATIENT
Start: 2018-10-12 | End: 2018-10-12 | Stop reason: HOSPADM

## 2018-10-12 RX ORDER — TAMSULOSIN HYDROCHLORIDE 0.4 MG/1
1 CAPSULE ORAL DAILY
Qty: 30 CAPSULE | Refills: 0 | Status: SHIPPED | OUTPATIENT
Start: 2018-10-12 | End: 2019-04-25

## 2018-10-12 RX ORDER — SODIUM CHLORIDE 0.9 % (FLUSH) 0.9 %
10 SYRINGE (ML) INJECTION AS NEEDED
Status: DISCONTINUED | OUTPATIENT
Start: 2018-10-12 | End: 2018-10-12 | Stop reason: HOSPADM

## 2018-10-12 RX ADMIN — DIPHENHYDRAMINE HYDROCHLORIDE 25 MG: 50 INJECTION, SOLUTION INTRAMUSCULAR; INTRAVENOUS at 06:00

## 2018-10-12 RX ADMIN — ONDANSETRON 4 MG: 2 INJECTION, SOLUTION INTRAMUSCULAR; INTRAVENOUS at 04:37

## 2018-10-12 RX ADMIN — NITROFURANTOIN 100 MG: 25; 75 CAPSULE ORAL at 06:04

## 2018-10-12 RX ADMIN — HYDROCODONE BITARTRATE AND ACETAMINOPHEN 1 TABLET: 5; 325 TABLET ORAL at 05:08

## 2018-10-12 RX ADMIN — TAMSULOSIN HYDROCHLORIDE 0.4 MG: 0.4 CAPSULE ORAL at 05:39

## 2018-10-12 RX ADMIN — LIDOCAINE 1 PATCH: 50 PATCH CUTANEOUS at 06:01

## 2018-10-12 RX ADMIN — NITROFURANTOIN MONOHYDRATE/MACROCRYSTALLINE 100 MG: 25; 75 CAPSULE ORAL at 06:04

## 2018-10-12 RX ADMIN — SODIUM CHLORIDE 500 ML: 9 INJECTION, SOLUTION INTRAVENOUS at 04:36

## 2018-10-12 RX ADMIN — ORPHENADRINE CITRATE 30 MG: 30 INJECTION INTRAMUSCULAR; INTRAVENOUS at 04:40

## 2018-10-12 NOTE — ED PROVIDER NOTES
Subjective   History of Present Illness  Patient is a 33-year-old female who presents the emergency department for right-sided flank pain radiating down her right groin for the past 12 hours.  She's had some nausea without vomiting, no fever or chills.  No diarrhea.  The patient states that her urine looks dark.  Patient has a history of stones in the past.  Her current symptoms feel like another stone.                             Review of Systems   Constitutional: Negative.    HENT: Negative.    Eyes: Negative.    Respiratory: Negative.    Cardiovascular: Negative.    Gastrointestinal: Negative.    Endocrine: Negative.    Genitourinary: Positive for flank pain and hematuria.   Musculoskeletal: Positive for back pain.       Past Medical History:   Diagnosis Date   • Depression    • Diabetes mellitus (CMS/HCC)    • DVT (deep venous thrombosis) (CMS/HCC)    • GERD (gastroesophageal reflux disease)    • Gout    • Hyperlipidemia    • Hypertension    • Hypothyroid    • Kidney stone    • Migraine    • Neuropathy    • PE (pulmonary embolism)        Allergies   Allergen Reactions   • Amoxicillin Hives   • Penicillins Hives   • Toradol [Ketorolac Tromethamine] Hives       Past Surgical History:   Procedure Laterality Date   • CARDIAC SURGERY      Heart Cath done in    •  SECTION     • CHOLECYSTECTOMY     • COLONOSCOPY         Family History   Problem Relation Age of Onset   • No Known Problems Mother    • No Known Problems Father    • No Known Problems Sister    • No Known Problems Brother    • No Known Problems Son    • No Known Problems Daughter    • No Known Problems Maternal Grandmother    • No Known Problems Maternal Grandfather    • No Known Problems Paternal Grandmother    • No Known Problems Paternal Grandfather    • No Known Problems Cousin    • Rheum arthritis Neg Hx    • Osteoarthritis Neg Hx    • Asthma Neg Hx    • Diabetes Neg Hx    • Heart failure Neg Hx    • Hyperlipidemia Neg Hx    • Hypertension  Neg Hx    • Migraines Neg Hx    • Rashes / Skin problems Neg Hx    • Seizures Neg Hx    • Stroke Neg Hx    • Thyroid disease Neg Hx        Social History     Social History   • Marital status:      Social History Main Topics   • Smoking status: Never Smoker   • Smokeless tobacco: Never Used   • Alcohol use No   • Drug use: No   • Sexual activity: Defer     Other Topics Concern   • Not on file           Objective   Physical Exam   Constitutional: She is oriented to person, place, and time. She appears well-developed and well-nourished. No distress.   HENT:   Head: Normocephalic and atraumatic.   Right Ear: External ear normal.   Left Ear: External ear normal.   Nose: Nose normal.   Mouth/Throat: Oropharynx is clear and moist. No oropharyngeal exudate.   Eyes: Pupils are equal, round, and reactive to light. Conjunctivae and EOM are normal. Right eye exhibits no discharge. Left eye exhibits no discharge. No scleral icterus.   Neck: Normal range of motion. Neck supple. No JVD present. No tracheal deviation present. No thyromegaly present.   Cardiovascular: Normal rate, regular rhythm, normal heart sounds and intact distal pulses.  Exam reveals no gallop and no friction rub.    No murmur heard.  Pulmonary/Chest: Effort normal and breath sounds normal. No stridor. No respiratory distress. She has no wheezes. She has no rales. She exhibits no tenderness.   Abdominal: Soft. Bowel sounds are normal. She exhibits no distension and no mass. There is no tenderness. There is no guarding.   Minimal right-sided flank tenderness   Lymphadenopathy:     She has no cervical adenopathy.   Neurological: She is alert and oriented to person, place, and time.   Skin: Skin is warm and dry. She is not diaphoretic.   Psychiatric: She has a normal mood and affect. Her behavior is normal.   Nursing note and vitals reviewed.      Procedures           ED Course  ED Course as of Oct 12 0710   Fri Oct 12, 2018   0545 NSR Rate 87 ECG 12 Lead  [EG]   0612 PRANAY reviewed shows no recent Rx for opiate pain medications, which is inconsistent with UDS.   [EG]      ED Course User Index  [EG] Marsha Rivero,       Patient began complaining of chest pain when told that she would not be getting any strong narcotic medication.          HEART Score (for prediction of 6-week risk of major adverse cardiac event) reviewed and/or performed as part of the patient evaluation and treatment planning process.  The result associated with this review/performance is: 1       MDM  Number of Diagnoses or Management Options  Acute cystitis with hematuria: new and requires workup  Hematuria, unspecified type: new and requires workup  Kidney stone: new and requires workup     Amount and/or Complexity of Data Reviewed  Clinical lab tests: ordered and reviewed  Decide to obtain previous medical records or to obtain history from someone other than the patient: yes  Review and summarize past medical records: yes  Discuss the patient with other providers: yes  Independent visualization of images, tracings, or specimens: yes    Risk of Complications, Morbidity, and/or Mortality  Presenting problems: high  Diagnostic procedures: high  Management options: high  General comments: Patient is a 33-year-old female who presents the emergency department complaining of right-sided flank pain.  Past medical records were reviewed which shows patient has had many CTscans in the past year related to right-sided flank pain.  She is also had referral to urology with a missed appointment.  Patient states that her current symptoms have been present for about 12 hours.  Patient had a UDS which is positive for oxycodone and opiates.  Pranay was reviewed which shows that patient has not had any prescription  either medication in 2018.  Patient's urine shows RBCs and WBCs.  She was treated with an oral antibiotic in the emergency department and discharged home with a prescription as well.  She was  advised to reschedule her appointment with urology as this is a chronic issue.  She was also advised of her discrepancy between her urine drug screen and her Pedro.    Critical Care  Total time providing critical care: < 30 minutes    Patient Progress  Patient progress: improved        Final diagnoses:   Hematuria, unspecified type   Kidney stone   Acute cystitis with hematuria            Marsha Rivero,   10/12/18 0714

## 2018-10-12 NOTE — ED NOTES
0626 PT IS AAO X4 PT HAS NO SIGNS OF DISTRESS BREATHING IS EQUAL AND UNLABORED SKIN PWD     Pallavi Hassan, RN  10/12/18 0680

## 2018-10-12 NOTE — DISCHARGE INSTRUCTIONS
Call one of the offices below to establish a primary care provider.  If you are unable to get an appointment and feel it is an emergency and need to be seen immediately please return to the Emergency Department.    Call one of the office below to set up a primary care provider.    Dr. Dharmesh Chao                                                                                                       [address] [facility] [address]  [phone]    Dr. Souza, Dr. ISHA Holden, Dr. ROSE MARIE Holden ([facility], [facility])  [address] [facility] [address]  [phone]    Dr. Henning, Dr. Larsen, Dr. Toro ([facility], [facility])  [address] [facility] [address]  [phone]    Dr. Gutirerez  [address] [facility] [address]  [phone]    Dr. Torres, Dr. Pena, Dr. Roberson, Dr. Garces ([facility])  [address] [place] [address] [address]  [facility] [address]  [phone]    Dr. Vanessa Gaytan  [address] [facility] [place] [address]  [phone]    Dr. Constance Ferraro  [address]   [address]  [facility] [address]  [phone]    Dr. Chao  [address] [facility] [address]  [phone]    Dr. Castanon, Dr. Cifuentes  [address] [facility] [place] [address]  [phone]    Dr. Lopez  [address] [address]                                                              [address]  [facility] [address]  [phone]    Dr. Fisher  [address] [facility] [id number] [phone]    Dr. Stephanie Teixeira  [address]  [address]  [facility] [address]  [phone]

## 2018-10-23 ENCOUNTER — HOSPITAL ENCOUNTER (EMERGENCY)
Facility: HOSPITAL | Age: 33
Discharge: LEFT BEFORE DISCHARGE | End: 2018-10-23
Attending: STUDENT IN AN ORGANIZED HEALTH CARE EDUCATION/TRAINING PROGRAM | Admitting: STUDENT IN AN ORGANIZED HEALTH CARE EDUCATION/TRAINING PROGRAM

## 2018-10-23 ENCOUNTER — APPOINTMENT (OUTPATIENT)
Dept: CT IMAGING | Facility: HOSPITAL | Age: 33
End: 2018-10-23

## 2018-10-23 VITALS
TEMPERATURE: 98.5 F | HEART RATE: 93 BPM | BODY MASS INDEX: 50.02 KG/M2 | OXYGEN SATURATION: 94 % | SYSTOLIC BLOOD PRESSURE: 144 MMHG | WEIGHT: 293 LBS | RESPIRATION RATE: 14 BRPM | HEIGHT: 64 IN | DIASTOLIC BLOOD PRESSURE: 95 MMHG

## 2018-10-23 DIAGNOSIS — R10.31 RIGHT LOWER QUADRANT ABDOMINAL PAIN: Primary | ICD-10-CM

## 2018-10-23 LAB
ALBUMIN SERPL-MCNC: 4.6 G/DL (ref 3.5–5)
ALBUMIN/GLOB SERPL: 1.3 G/DL (ref 1–2)
ALP SERPL-CCNC: 67 U/L (ref 38–126)
ALT SERPL W P-5'-P-CCNC: 31 U/L (ref 13–69)
ANION GAP SERPL CALCULATED.3IONS-SCNC: 13.7 MMOL/L (ref 10–20)
AST SERPL-CCNC: 38 U/L (ref 15–46)
B-HCG UR QL: NEGATIVE
BACTERIA UR QL AUTO: ABNORMAL /HPF
BASOPHILS # BLD AUTO: 0.02 10*3/MM3 (ref 0–0.2)
BASOPHILS NFR BLD AUTO: 0.4 % (ref 0–2.5)
BILIRUB SERPL-MCNC: 0.4 MG/DL (ref 0.2–1.3)
BILIRUB UR QL STRIP: NEGATIVE
BUN BLD-MCNC: 8 MG/DL (ref 7–20)
BUN/CREAT SERPL: 13.3 (ref 7.1–23.5)
CALCIUM SPEC-SCNC: 9.7 MG/DL (ref 8.4–10.2)
CHLORIDE SERPL-SCNC: 107 MMOL/L (ref 98–107)
CLARITY UR: CLEAR
CO2 SERPL-SCNC: 23 MMOL/L (ref 26–30)
COLOR UR: ABNORMAL
CREAT BLD-MCNC: 0.6 MG/DL (ref 0.6–1.3)
DEPRECATED RDW RBC AUTO: 45.5 FL (ref 37–54)
EOSINOPHIL # BLD AUTO: 0.1 10*3/MM3 (ref 0–0.7)
EOSINOPHIL NFR BLD AUTO: 1.8 % (ref 0–7)
ERYTHROCYTE [DISTWIDTH] IN BLOOD BY AUTOMATED COUNT: 13.8 % (ref 11.5–14.5)
GFR SERPL CREATININE-BSD FRML MDRD: 116 ML/MIN/1.73
GLOBULIN UR ELPH-MCNC: 3.5 GM/DL
GLUCOSE BLD-MCNC: 108 MG/DL (ref 74–98)
GLUCOSE UR STRIP-MCNC: NEGATIVE MG/DL
HCT VFR BLD AUTO: 35.3 % (ref 37–47)
HGB BLD-MCNC: 11.9 G/DL (ref 12–16)
HGB UR QL STRIP.AUTO: ABNORMAL
HOLD SPECIMEN: NORMAL
HOLD SPECIMEN: NORMAL
HYALINE CASTS UR QL AUTO: ABNORMAL /LPF
IMM GRANULOCYTES # BLD: 0.01 10*3/MM3 (ref 0–0.06)
IMM GRANULOCYTES NFR BLD: 0.2 % (ref 0–0.6)
INR PPP: 4.94 (ref 0.9–1.1)
KETONES UR QL STRIP: NEGATIVE
LEUKOCYTE ESTERASE UR QL STRIP.AUTO: NEGATIVE
LIPASE SERPL-CCNC: 48 U/L (ref 23–300)
LYMPHOCYTES # BLD AUTO: 2.26 10*3/MM3 (ref 0.6–3.4)
LYMPHOCYTES NFR BLD AUTO: 41.4 % (ref 10–50)
MCH RBC QN AUTO: 30.6 PG (ref 27–31)
MCHC RBC AUTO-ENTMCNC: 33.7 G/DL (ref 30–37)
MCV RBC AUTO: 90.7 FL (ref 81–99)
MONOCYTES # BLD AUTO: 0.35 10*3/MM3 (ref 0–0.9)
MONOCYTES NFR BLD AUTO: 6.4 % (ref 0–12)
NEUTROPHILS # BLD AUTO: 2.72 10*3/MM3 (ref 2–6.9)
NEUTROPHILS NFR BLD AUTO: 49.8 % (ref 37–80)
NITRITE UR QL STRIP: NEGATIVE
NRBC BLD MANUAL-RTO: 0 /100 WBC (ref 0–0)
PH UR STRIP.AUTO: <=5 [PH] (ref 5–8)
PLATELET # BLD AUTO: 212 10*3/MM3 (ref 130–400)
PMV BLD AUTO: 8.9 FL (ref 6–12)
POTASSIUM BLD-SCNC: 3.7 MMOL/L (ref 3.5–5.1)
PROT SERPL-MCNC: 8.1 G/DL (ref 6.3–8.2)
PROT UR QL STRIP: ABNORMAL
PROTHROMBIN TIME: 53.5 SECONDS (ref 9.3–12.1)
RBC # BLD AUTO: 3.89 10*6/MM3 (ref 4.2–5.4)
RBC # UR: ABNORMAL /HPF
REF LAB TEST METHOD: ABNORMAL
SODIUM BLD-SCNC: 140 MMOL/L (ref 137–145)
SP GR UR STRIP: 1.02 (ref 1–1.03)
SQUAMOUS #/AREA URNS HPF: ABNORMAL /HPF
UROBILINOGEN UR QL STRIP: ABNORMAL
WBC NRBC COR # BLD: 5.46 10*3/MM3 (ref 4.8–10.8)
WBC UR QL AUTO: ABNORMAL /HPF
WHOLE BLOOD HOLD SPECIMEN: NORMAL
WHOLE BLOOD HOLD SPECIMEN: NORMAL

## 2018-10-23 PROCEDURE — 80053 COMPREHEN METABOLIC PANEL: CPT | Performed by: STUDENT IN AN ORGANIZED HEALTH CARE EDUCATION/TRAINING PROGRAM

## 2018-10-23 PROCEDURE — 83690 ASSAY OF LIPASE: CPT | Performed by: STUDENT IN AN ORGANIZED HEALTH CARE EDUCATION/TRAINING PROGRAM

## 2018-10-23 PROCEDURE — 81001 URINALYSIS AUTO W/SCOPE: CPT | Performed by: STUDENT IN AN ORGANIZED HEALTH CARE EDUCATION/TRAINING PROGRAM

## 2018-10-23 PROCEDURE — 96374 THER/PROPH/DIAG INJ IV PUSH: CPT

## 2018-10-23 PROCEDURE — 74176 CT ABD & PELVIS W/O CONTRAST: CPT

## 2018-10-23 PROCEDURE — 96375 TX/PRO/DX INJ NEW DRUG ADDON: CPT

## 2018-10-23 PROCEDURE — 99283 EMERGENCY DEPT VISIT LOW MDM: CPT

## 2018-10-23 PROCEDURE — 25010000002 ONDANSETRON PER 1 MG: Performed by: NURSE PRACTITIONER

## 2018-10-23 PROCEDURE — 81025 URINE PREGNANCY TEST: CPT | Performed by: STUDENT IN AN ORGANIZED HEALTH CARE EDUCATION/TRAINING PROGRAM

## 2018-10-23 PROCEDURE — 85610 PROTHROMBIN TIME: CPT | Performed by: NURSE PRACTITIONER

## 2018-10-23 PROCEDURE — 85025 COMPLETE CBC W/AUTO DIFF WBC: CPT | Performed by: STUDENT IN AN ORGANIZED HEALTH CARE EDUCATION/TRAINING PROGRAM

## 2018-10-23 RX ORDER — SODIUM CHLORIDE 0.9 % (FLUSH) 0.9 %
10 SYRINGE (ML) INJECTION AS NEEDED
Status: DISCONTINUED | OUTPATIENT
Start: 2018-10-23 | End: 2018-10-23 | Stop reason: HOSPADM

## 2018-10-23 RX ORDER — ONDANSETRON 2 MG/ML
4 INJECTION INTRAMUSCULAR; INTRAVENOUS ONCE
Status: COMPLETED | OUTPATIENT
Start: 2018-10-23 | End: 2018-10-23

## 2018-10-23 RX ADMIN — SODIUM CHLORIDE 1000 ML: 9 INJECTION, SOLUTION INTRAVENOUS at 20:25

## 2018-10-23 RX ADMIN — LIDOCAINE HYDROCHLORIDE 150 MG: 10 INJECTION, SOLUTION INFILTRATION; PERINEURAL at 20:24

## 2018-10-23 RX ADMIN — ONDANSETRON 4 MG: 2 INJECTION INTRAMUSCULAR; INTRAVENOUS at 20:30

## 2018-10-24 NOTE — ED NOTES
Family states that they need to leave due to a sick family member.     Marita Farah, RN  10/23/18 2045   APRN notified     Marita Farah, ROB  10/23/18 2045

## 2018-10-24 NOTE — ED NOTES
Ashley Daniel APRN at bedside to inform pt of critical INR result and urinalysis results. Pt still choosing to leave AMA.     Marita Farah, RN  10/23/18 2053

## 2018-10-24 NOTE — ED PROVIDER NOTES
Subjective   History of Present Illness  Is a 33-year-old female who comes in today complaining of right flank pain.  She reports symptoms started intermittently 2 days ago and has progressively gotten worse over the last few hours.  She also reports blood in her urine today.  He complains of nausea and vomiting.  She did take Tylenol without any relief of symptoms.  Review of Systems   Constitutional: Negative.    HENT: Negative.    Eyes: Negative.    Respiratory: Negative.    Cardiovascular: Negative.    Gastrointestinal: Positive for nausea and vomiting.   Endocrine: Negative.    Genitourinary: Positive for flank pain and hematuria.   Skin: Negative.    Allergic/Immunologic: Negative.    Neurological: Negative.    Hematological: Negative.    Psychiatric/Behavioral: Negative.    All other systems reviewed and are negative.      Past Medical History:   Diagnosis Date   • Depression    • Diabetes mellitus (CMS/HCC)    • DVT (deep venous thrombosis) (CMS/HCC)    • GERD (gastroesophageal reflux disease)    • Gout    • Hyperlipidemia    • Hypertension    • Hypothyroid    • Kidney stone    • Migraine    • Neuropathy    • PE (pulmonary embolism)        Allergies   Allergen Reactions   • Amoxicillin Hives   • Penicillins Hives   • Toradol [Ketorolac Tromethamine] Hives       Past Surgical History:   Procedure Laterality Date   • CARDIAC SURGERY      Heart Cath done in    •  SECTION     • CHOLECYSTECTOMY     • COLONOSCOPY         Family History   Problem Relation Age of Onset   • No Known Problems Mother    • No Known Problems Father    • No Known Problems Sister    • No Known Problems Brother    • No Known Problems Son    • No Known Problems Daughter    • No Known Problems Maternal Grandmother    • No Known Problems Maternal Grandfather    • No Known Problems Paternal Grandmother    • No Known Problems Paternal Grandfather    • No Known Problems Cousin    • Rheum arthritis Neg Hx    • Osteoarthritis Neg Hx     • Asthma Neg Hx    • Diabetes Neg Hx    • Heart failure Neg Hx    • Hyperlipidemia Neg Hx    • Hypertension Neg Hx    • Migraines Neg Hx    • Rashes / Skin problems Neg Hx    • Seizures Neg Hx    • Stroke Neg Hx    • Thyroid disease Neg Hx        Social History     Social History   • Marital status:      Social History Main Topics   • Smoking status: Never Smoker   • Smokeless tobacco: Never Used   • Alcohol use No   • Drug use: No   • Sexual activity: Defer     Other Topics Concern   • Not on file           Objective   Physical Exam   Constitutional: She appears well-developed and well-nourished.   Nursing note and vitals reviewed.  GEN: No acute distress  Head: Normocephalic, atraumatic  Eyes: Pupils equal round reactive to light  ENT: Posterior pharynx normal in appearance, oral mucosa is moist  Chest: Nontender to palpation  Cardiovascular: Regular rate  Lungs: Clear to auscultation bilaterally  Abdomen: Soft, tender right flank, nondistended, no peritoneal signs  Extremities: No edema, normal appearance  Neuro: GCS 15  Psych: Mood and affect are appropriate      Procedures           ED Course  ED Course as of Oct 23 2102   Tue Oct 23, 2018   2020 Informed by radiology that patient had old contrast in her abdomen likely from recent CT scan although patient denies. She also states she has to have pain medication other than haldol and tordol. She also reports she needs phenergan that zofran does not work. She is displaying drug seeking behavior.   [TW]   2056 Informed by nurse that patient is leaving AMA that she needs to go get her son. I received lab report that patient INR was 4.94. I have advised patient of high risk having this elevated INR. I have advised her she could be having abdominal bleeding due to her pain. However, she reports she is still leaving. I have advised her of the risk of worse bleeding and even death. She states understanding. I have also instructed her strict return to care  instructions and she is agreeable.   [TW]      ED Course User Index  [TW] Ashley Daniel, APRN                  MDM  Number of Diagnoses or Management Options  Diagnosis management comments: Differential diagnosis include urinary tract infection, kidney stone, pyelonephritis, colitis.       Amount and/or Complexity of Data Reviewed  Clinical lab tests: ordered and reviewed  Tests in the radiology section of CPT®: ordered and reviewed  Review and summarize past medical records: yes  Discuss the patient with other providers: yes    Risk of Complications, Morbidity, and/or Mortality  Presenting problems: moderate  Diagnostic procedures: moderate  Management options: moderate          Final diagnoses:   Right lower quadrant abdominal pain            Ashley Daniel, APRN  10/23/18 2103

## 2018-11-04 ENCOUNTER — APPOINTMENT (OUTPATIENT)
Dept: CT IMAGING | Facility: HOSPITAL | Age: 33
End: 2018-11-04
Payer: MEDICAID

## 2018-11-04 ENCOUNTER — HOSPITAL ENCOUNTER (EMERGENCY)
Facility: HOSPITAL | Age: 33
Discharge: LEFT BEFORE DISCHARGE | End: 2018-11-04
Attending: EMERGENCY MEDICINE
Payer: MEDICAID

## 2018-11-04 VITALS
WEIGHT: 293 LBS | OXYGEN SATURATION: 95 % | RESPIRATION RATE: 18 BRPM | TEMPERATURE: 97.9 F | HEART RATE: 76 BPM | HEIGHT: 64 IN | BODY MASS INDEX: 50.02 KG/M2

## 2018-11-04 DIAGNOSIS — R10.9 RIGHT FLANK PAIN: Primary | ICD-10-CM

## 2018-11-04 LAB
AMPHETAMINE SCREEN, URINE: NORMAL
BARBITURATE SCREEN URINE: NORMAL
BENZODIAZEPINE SCREEN, URINE: NORMAL
BILIRUBIN URINE: NEGATIVE
BLOOD, URINE: ABNORMAL
CANNABINOID SCREEN URINE: NORMAL
CLARITY: ABNORMAL
COCAINE METABOLITE SCREEN URINE: NORMAL
COLOR: ABNORMAL
EPITHELIAL CELLS, UA: ABNORMAL /HPF
GLUCOSE URINE: NEGATIVE MG/DL
KETONES, URINE: NEGATIVE MG/DL
LEUKOCYTE ESTERASE, URINE: NEGATIVE
Lab: NORMAL
METHADONE SCREEN, URINE: NORMAL
METHAMPHETAMINE, URINE: NORMAL
MICROSCOPIC EXAMINATION: YES
NITRITE, URINE: NEGATIVE
OPIATE SCREEN URINE: NORMAL
PH UA: 7
PHENCYCLIDINE SCREEN URINE: NORMAL
PROPOXYPHENE SCREEN, URINE: NORMAL
PROTEIN UA: ABNORMAL MG/DL
RBC UA: >100 /HPF (ref 0–2)
SPECIFIC GRAVITY UA: 1.02
TRICYCLIC, URINE: NORMAL
UR OXYCODONE RAPID SCREEN: NORMAL
URINE REFLEX TO CULTURE: ABNORMAL
URINE TYPE: ABNORMAL
UROBILINOGEN, URINE: 0.2 E.U./DL
WBC UA: ABNORMAL /HPF (ref 0–5)

## 2018-11-04 PROCEDURE — 99283 EMERGENCY DEPT VISIT LOW MDM: CPT

## 2018-11-04 PROCEDURE — 80307 DRUG TEST PRSMV CHEM ANLYZR: CPT

## 2018-11-04 PROCEDURE — 81001 URINALYSIS AUTO W/SCOPE: CPT

## 2018-11-04 RX ORDER — 0.9 % SODIUM CHLORIDE 0.9 %
1000 INTRAVENOUS SOLUTION INTRAVENOUS ONCE
Status: DISCONTINUED | OUTPATIENT
Start: 2018-11-04 | End: 2018-11-04 | Stop reason: HOSPADM

## 2018-11-04 RX ORDER — ONDANSETRON 2 MG/ML
4 INJECTION INTRAMUSCULAR; INTRAVENOUS ONCE
Status: DISCONTINUED | OUTPATIENT
Start: 2018-11-04 | End: 2018-11-04 | Stop reason: HOSPADM

## 2018-11-18 ENCOUNTER — HOSPITAL ENCOUNTER (EMERGENCY)
Facility: HOSPITAL | Age: 33
Discharge: HOME | End: 2018-11-19
Attending: GENERAL ACUTE CARE HOSPITAL | Admitting: GENERAL ACUTE CARE HOSPITAL

## 2018-11-18 DIAGNOSIS — R07.9 CHEST PAIN, UNSPECIFIED TYPE: Primary | ICD-10-CM

## 2018-11-18 PROCEDURE — 93005 ELECTROCARDIOGRAM TRACING: CPT | Performed by: GENERAL ACUTE CARE HOSPITAL

## 2018-11-18 PROCEDURE — 99284 EMERGENCY DEPT VISIT MOD MDM: CPT

## 2018-11-18 PROCEDURE — 36415 COLL VENOUS BLD VENIPUNCTURE: CPT

## 2018-11-19 ENCOUNTER — APPOINTMENT (OUTPATIENT)
Dept: GENERAL RADIOLOGY | Facility: HOSPITAL | Age: 33
End: 2018-11-19

## 2018-11-19 VITALS
DIASTOLIC BLOOD PRESSURE: 90 MMHG | HEIGHT: 64 IN | BODY MASS INDEX: 50.02 KG/M2 | TEMPERATURE: 98.5 F | OXYGEN SATURATION: 100 % | SYSTOLIC BLOOD PRESSURE: 120 MMHG | HEART RATE: 80 BPM | RESPIRATION RATE: 16 BRPM | WEIGHT: 293 LBS

## 2018-11-19 LAB
ALBUMIN SERPL-MCNC: 4.4 G/DL (ref 3.5–5)
ALBUMIN/GLOB SERPL: 1.5 G/DL (ref 1.5–2.5)
ALP SERPL-CCNC: 61 U/L (ref 35–104)
ALT SERPL W P-5'-P-CCNC: 27 U/L (ref 10–36)
ANION GAP SERPL CALCULATED.3IONS-SCNC: 10.8 MMOL/L (ref 3.6–11.2)
APTT PPP: 37.1 SECONDS (ref 23.8–36.1)
AST SERPL-CCNC: 20 U/L (ref 10–30)
BASOPHILS # BLD AUTO: 0.02 10*3/MM3 (ref 0–0.3)
BASOPHILS NFR BLD AUTO: 0.4 % (ref 0–2)
BILIRUB SERPL-MCNC: 0.4 MG/DL (ref 0.2–1.8)
BUN BLD-MCNC: 12 MG/DL (ref 7–21)
BUN/CREAT SERPL: 18.8 (ref 7–25)
CALCIUM SPEC-SCNC: 9.1 MG/DL (ref 7.7–10)
CHLORIDE SERPL-SCNC: 110 MMOL/L (ref 99–112)
CO2 SERPL-SCNC: 20.2 MMOL/L (ref 24.3–31.9)
CREAT BLD-MCNC: 0.64 MG/DL (ref 0.43–1.29)
DEPRECATED RDW RBC AUTO: 43 FL (ref 37–54)
EOSINOPHIL # BLD AUTO: 0.11 10*3/MM3 (ref 0–0.7)
EOSINOPHIL NFR BLD AUTO: 2.1 % (ref 0–5)
ERYTHROCYTE [DISTWIDTH] IN BLOOD BY AUTOMATED COUNT: 13.4 % (ref 11.5–14.5)
GFR SERPL CREATININE-BSD FRML MDRD: 108 ML/MIN/1.73
GLOBULIN UR ELPH-MCNC: 2.9 GM/DL
GLUCOSE BLD-MCNC: 142 MG/DL (ref 70–110)
HCG INTACT+B SERPL-ACNC: <2 MIU/ML (ref 0–5)
HCT VFR BLD AUTO: 33.6 % (ref 37–47)
HGB BLD-MCNC: 11.4 G/DL (ref 12–16)
IMM GRANULOCYTES # BLD: 0.01 10*3/MM3 (ref 0–0.03)
IMM GRANULOCYTES NFR BLD: 0.2 % (ref 0–0.5)
INR PPP: 2.32 (ref 0.9–1.1)
LYMPHOCYTES # BLD AUTO: 1.76 10*3/MM3 (ref 1–3)
LYMPHOCYTES NFR BLD AUTO: 33.9 % (ref 21–51)
MCH RBC QN AUTO: 31 PG (ref 27–33)
MCHC RBC AUTO-ENTMCNC: 33.9 G/DL (ref 33–37)
MCV RBC AUTO: 91.3 FL (ref 80–94)
MONOCYTES # BLD AUTO: 0.4 10*3/MM3 (ref 0.1–0.9)
MONOCYTES NFR BLD AUTO: 7.7 % (ref 0–10)
NEUTROPHILS # BLD AUTO: 2.89 10*3/MM3 (ref 1.4–6.5)
NEUTROPHILS NFR BLD AUTO: 55.7 % (ref 30–70)
OSMOLALITY SERPL CALC.SUM OF ELEC: 283.4 MOSM/KG (ref 273–305)
PLATELET # BLD AUTO: 253 10*3/MM3 (ref 130–400)
PMV BLD AUTO: 8.7 FL (ref 6–10)
POTASSIUM BLD-SCNC: 3.7 MMOL/L (ref 3.5–5.3)
PROT SERPL-MCNC: 7.3 G/DL (ref 6–8)
PROTHROMBIN TIME: 25.7 SECONDS (ref 11–15.4)
RBC # BLD AUTO: 3.68 10*6/MM3 (ref 4.2–5.4)
SODIUM BLD-SCNC: 141 MMOL/L (ref 135–153)
TROPONIN I SERPL-MCNC: <0.006 NG/ML
WBC NRBC COR # BLD: 5.19 10*3/MM3 (ref 4.5–12.5)

## 2018-11-19 PROCEDURE — 85025 COMPLETE CBC W/AUTO DIFF WBC: CPT | Performed by: GENERAL ACUTE CARE HOSPITAL

## 2018-11-19 PROCEDURE — 71045 X-RAY EXAM CHEST 1 VIEW: CPT

## 2018-11-19 PROCEDURE — 85730 THROMBOPLASTIN TIME PARTIAL: CPT | Performed by: GENERAL ACUTE CARE HOSPITAL

## 2018-11-19 PROCEDURE — 71045 X-RAY EXAM CHEST 1 VIEW: CPT | Performed by: RADIOLOGY

## 2018-11-19 PROCEDURE — 84702 CHORIONIC GONADOTROPIN TEST: CPT | Performed by: GENERAL ACUTE CARE HOSPITAL

## 2018-11-19 PROCEDURE — 85610 PROTHROMBIN TIME: CPT | Performed by: GENERAL ACUTE CARE HOSPITAL

## 2018-11-19 PROCEDURE — 80053 COMPREHEN METABOLIC PANEL: CPT | Performed by: GENERAL ACUTE CARE HOSPITAL

## 2018-11-19 PROCEDURE — 84484 ASSAY OF TROPONIN QUANT: CPT | Performed by: GENERAL ACUTE CARE HOSPITAL

## 2018-11-19 RX ORDER — ACETAMINOPHEN 500 MG
1000 TABLET ORAL ONCE
Status: COMPLETED | OUTPATIENT
Start: 2018-11-19 | End: 2018-11-19

## 2018-11-19 RX ADMIN — ACETAMINOPHEN 1000 MG: 500 TABLET ORAL at 00:21

## 2018-11-19 NOTE — ED NOTES
0108 PT IS AAO X4 PT HAS NO SIGNS OF DISTRESS BREATHING IS EQUAL AND UNLABORED SKIN PWD     Pallavi Hsasan, RN  11/19/18 011

## 2018-11-19 NOTE — ED PROVIDER NOTES
Subjective   Past medical history of DVT, PE, diabetes, morbid obesity, hypertension, depression.  Presented today with chest pain that radiates down to the proximal portion of her right lower extremity.  Denying radiation of pain, back pain, near/syncope, exertional component, diaphoresis, NV. Pt is well appearing, resting comfortably in bed. Requesting narcotics.             Review of Systems   Constitutional: Negative for activity change, appetite change and chills.       Past Medical History:   Diagnosis Date   • Depression    • Diabetes mellitus (CMS/HCC)    • DVT (deep venous thrombosis) (CMS/HCC)    • GERD (gastroesophageal reflux disease)    • Gout    • Hyperlipidemia    • Hypertension    • Hypothyroid    • Kidney stone    • Migraine    • Neuropathy    • PE (pulmonary embolism)        Allergies   Allergen Reactions   • Amoxicillin Hives   • Penicillins Hives   • Toradol [Ketorolac Tromethamine] Hives       Past Surgical History:   Procedure Laterality Date   • CARDIAC SURGERY      Heart Cath done in    •  SECTION     • CHOLECYSTECTOMY     • COLONOSCOPY         Family History   Problem Relation Age of Onset   • No Known Problems Mother    • No Known Problems Father    • No Known Problems Sister    • No Known Problems Brother    • No Known Problems Son    • No Known Problems Daughter    • No Known Problems Maternal Grandmother    • No Known Problems Maternal Grandfather    • No Known Problems Paternal Grandmother    • No Known Problems Paternal Grandfather    • No Known Problems Cousin    • Rheum arthritis Neg Hx    • Osteoarthritis Neg Hx    • Asthma Neg Hx    • Diabetes Neg Hx    • Heart failure Neg Hx    • Hyperlipidemia Neg Hx    • Hypertension Neg Hx    • Migraines Neg Hx    • Rashes / Skin problems Neg Hx    • Seizures Neg Hx    • Stroke Neg Hx    • Thyroid disease Neg Hx        Social History     Socioeconomic History   • Marital status:      Spouse name: Not on file   • Number of  children: Not on file   • Years of education: Not on file   • Highest education level: Not on file   Tobacco Use   • Smoking status: Never Smoker   • Smokeless tobacco: Never Used   Substance and Sexual Activity   • Alcohol use: No   • Drug use: No   • Sexual activity: Defer           Objective   Physical Exam   Constitutional: She is oriented to person, place, and time. She appears well-developed and well-nourished.  Non-toxic appearance. She does not appear ill. No distress.   HENT:   Head: Normocephalic and atraumatic.   Eyes: EOM are normal. Pupils are equal, round, and reactive to light.   Neck: Normal range of motion. Neck supple. No hepatojugular reflux and no JVD present. No tracheal deviation present. No thyromegaly present.   Cardiovascular: Normal rate, regular rhythm and normal pulses.  No extrasystoles are present. PMI is not displaced. Exam reveals no gallop, no S3, no S4, no distant heart sounds and no friction rub.   No murmur heard.  Pulmonary/Chest: Effort normal and breath sounds normal. No accessory muscle usage or stridor. No tachypnea. No respiratory distress. She has no decreased breath sounds. She has no wheezes. She has no rhonchi. She has no rales.   Abdominal: Soft. Bowel sounds are normal. She exhibits no distension and no mass. There is no tenderness. There is no guarding.   Musculoskeletal:        Right lower leg: Normal. She exhibits no edema.        Left lower leg: Normal. She exhibits no edema.   Neurological: She is alert and oriented to person, place, and time. She is not disoriented. No cranial nerve deficit.   Skin: Skin is dry. Capillary refill takes less than 2 seconds.   Psychiatric: Her mood appears not anxious. She is not agitated.       Procedures           ED Course                  MDM  Number of Diagnoses or Management Options  Chest pain, unspecified type: established and improving  Diagnosis management comments: Patient continues to request narcotics however I told her  that would be inappropriate.  The patient told me that she no longer wants to stay here even though I asked her to stay for another troponin.  I told her that I was concerned about her heart and lungs however she refused stay.  I told her that she must return if anything changes or worsens, she is welcome to return here at any time.       Amount and/or Complexity of Data Reviewed  Clinical lab tests: ordered and reviewed  Tests in the radiology section of CPT®: ordered and reviewed  Tests in the medicine section of CPT®: ordered and reviewed  Decide to obtain previous medical records or to obtain history from someone other than the patient: yes  Review and summarize past medical records: yes  Independent visualization of images, tracings, or specimens: yes    Risk of Complications, Morbidity, and/or Mortality  Presenting problems: moderate  Diagnostic procedures: moderate  Management options: moderate    Patient Progress  Patient progress: stable        Final diagnoses:   None            Danial Higgins Jr., MD  11/19/18 0109

## 2018-12-08 ENCOUNTER — HOSPITAL ENCOUNTER (EMERGENCY)
Facility: HOSPITAL | Age: 33
Discharge: HOME | End: 2018-12-08
Attending: EMERGENCY MEDICINE | Admitting: EMERGENCY MEDICINE

## 2018-12-08 ENCOUNTER — APPOINTMENT (OUTPATIENT)
Dept: CT IMAGING | Facility: HOSPITAL | Age: 33
End: 2018-12-08

## 2018-12-08 VITALS
TEMPERATURE: 98.8 F | HEART RATE: 83 BPM | RESPIRATION RATE: 12 BRPM | HEIGHT: 64 IN | OXYGEN SATURATION: 98 % | SYSTOLIC BLOOD PRESSURE: 151 MMHG | DIASTOLIC BLOOD PRESSURE: 86 MMHG | BODY MASS INDEX: 50.02 KG/M2 | WEIGHT: 293 LBS

## 2018-12-08 DIAGNOSIS — R10.9 FLANK PAIN: Primary | ICD-10-CM

## 2018-12-08 DIAGNOSIS — R31.9 HEMATURIA, UNSPECIFIED TYPE: ICD-10-CM

## 2018-12-08 LAB
ALBUMIN SERPL-MCNC: 4.4 G/DL (ref 3.5–5)
ALBUMIN/GLOB SERPL: 1.6 G/DL (ref 1.5–2.5)
ALP SERPL-CCNC: 62 U/L (ref 35–104)
ALT SERPL W P-5'-P-CCNC: 25 U/L (ref 10–36)
AMORPH URATE CRY URNS QL MICRO: ABNORMAL /HPF
ANION GAP SERPL CALCULATED.3IONS-SCNC: 8.6 MMOL/L (ref 3.6–11.2)
AST SERPL-CCNC: 19 U/L (ref 10–30)
BACTERIA UR QL AUTO: ABNORMAL /HPF
BASOPHILS # BLD AUTO: 0.03 10*3/MM3 (ref 0–0.3)
BASOPHILS NFR BLD AUTO: 0.6 % (ref 0–2)
BILIRUB SERPL-MCNC: 0.3 MG/DL (ref 0.2–1.8)
BILIRUB UR QL STRIP: NEGATIVE
BUN BLD-MCNC: 8 MG/DL (ref 7–21)
BUN/CREAT SERPL: 14.5 (ref 7–25)
CALCIUM SPEC-SCNC: 9.3 MG/DL (ref 7.7–10)
CHLORIDE SERPL-SCNC: 109 MMOL/L (ref 99–112)
CLARITY UR: ABNORMAL
CO2 SERPL-SCNC: 22.4 MMOL/L (ref 24.3–31.9)
COLOR UR: ABNORMAL
CREAT BLD-MCNC: 0.55 MG/DL (ref 0.43–1.29)
DEPRECATED RDW RBC AUTO: 44.1 FL (ref 37–54)
EOSINOPHIL # BLD AUTO: 0.2 10*3/MM3 (ref 0–0.7)
EOSINOPHIL NFR BLD AUTO: 4.1 % (ref 0–5)
ERYTHROCYTE [DISTWIDTH] IN BLOOD BY AUTOMATED COUNT: 13.6 % (ref 11.5–14.5)
GFR SERPL CREATININE-BSD FRML MDRD: 128 ML/MIN/1.73
GLOBULIN UR ELPH-MCNC: 2.7 GM/DL
GLUCOSE BLD-MCNC: 165 MG/DL (ref 70–110)
GLUCOSE UR STRIP-MCNC: NEGATIVE MG/DL
HCG SERPL QL: NEGATIVE
HCT VFR BLD AUTO: 33.3 % (ref 37–47)
HGB BLD-MCNC: 10.8 G/DL (ref 12–16)
HGB UR QL STRIP.AUTO: ABNORMAL
HOLD SPECIMEN: NORMAL
HOLD SPECIMEN: NORMAL
HYALINE CASTS UR QL AUTO: ABNORMAL /LPF
IMM GRANULOCYTES # BLD: 0.01 10*3/MM3 (ref 0–0.03)
IMM GRANULOCYTES NFR BLD: 0.2 % (ref 0–0.5)
INR PPP: 1.5 (ref 0.9–1.1)
KETONES UR QL STRIP: NEGATIVE
LEUKOCYTE ESTERASE UR QL STRIP.AUTO: ABNORMAL
LIPASE SERPL-CCNC: 43 U/L (ref 13–60)
LYMPHOCYTES # BLD AUTO: 2.12 10*3/MM3 (ref 1–3)
LYMPHOCYTES NFR BLD AUTO: 43.3 % (ref 21–51)
MCH RBC QN AUTO: 30 PG (ref 27–33)
MCHC RBC AUTO-ENTMCNC: 32.4 G/DL (ref 33–37)
MCV RBC AUTO: 92.5 FL (ref 80–94)
MONOCYTES # BLD AUTO: 0.38 10*3/MM3 (ref 0.1–0.9)
MONOCYTES NFR BLD AUTO: 7.8 % (ref 0–10)
NEUTROPHILS # BLD AUTO: 2.16 10*3/MM3 (ref 1.4–6.5)
NEUTROPHILS NFR BLD AUTO: 44 % (ref 30–70)
NITRITE UR QL STRIP: NEGATIVE
OSMOLALITY SERPL CALC.SUM OF ELEC: 281.4 MOSM/KG (ref 273–305)
PH UR STRIP.AUTO: 8 [PH] (ref 5–8)
PLATELET # BLD AUTO: 220 10*3/MM3 (ref 130–400)
PMV BLD AUTO: 9 FL (ref 6–10)
POTASSIUM BLD-SCNC: 4.1 MMOL/L (ref 3.5–5.3)
PROT SERPL-MCNC: 7.1 G/DL (ref 6–8)
PROT UR QL STRIP: ABNORMAL
PROTHROMBIN TIME: 18.4 SECONDS (ref 11–15.4)
RBC # BLD AUTO: 3.6 10*6/MM3 (ref 4.2–5.4)
RBC # UR: ABNORMAL /HPF
REF LAB TEST METHOD: ABNORMAL
SODIUM BLD-SCNC: 140 MMOL/L (ref 135–153)
SP GR UR STRIP: 1.02 (ref 1–1.03)
SQUAMOUS #/AREA URNS HPF: ABNORMAL /HPF
UROBILINOGEN UR QL STRIP: ABNORMAL
WBC NRBC COR # BLD: 4.9 10*3/MM3 (ref 4.5–12.5)
WBC UR QL AUTO: ABNORMAL /HPF
WHOLE BLOOD HOLD SPECIMEN: NORMAL
WHOLE BLOOD HOLD SPECIMEN: NORMAL

## 2018-12-08 PROCEDURE — 99284 EMERGENCY DEPT VISIT MOD MDM: CPT

## 2018-12-08 PROCEDURE — 74176 CT ABD & PELVIS W/O CONTRAST: CPT

## 2018-12-08 PROCEDURE — 83690 ASSAY OF LIPASE: CPT | Performed by: EMERGENCY MEDICINE

## 2018-12-08 PROCEDURE — 80053 COMPREHEN METABOLIC PANEL: CPT | Performed by: EMERGENCY MEDICINE

## 2018-12-08 PROCEDURE — 96376 TX/PRO/DX INJ SAME DRUG ADON: CPT

## 2018-12-08 PROCEDURE — 96375 TX/PRO/DX INJ NEW DRUG ADDON: CPT

## 2018-12-08 PROCEDURE — 25010000002 ONDANSETRON PER 1 MG: Performed by: EMERGENCY MEDICINE

## 2018-12-08 PROCEDURE — 85025 COMPLETE CBC W/AUTO DIFF WBC: CPT | Performed by: EMERGENCY MEDICINE

## 2018-12-08 PROCEDURE — 74176 CT ABD & PELVIS W/O CONTRAST: CPT | Performed by: RADIOLOGY

## 2018-12-08 PROCEDURE — 85610 PROTHROMBIN TIME: CPT | Performed by: EMERGENCY MEDICINE

## 2018-12-08 PROCEDURE — 81001 URINALYSIS AUTO W/SCOPE: CPT | Performed by: EMERGENCY MEDICINE

## 2018-12-08 PROCEDURE — 96374 THER/PROPH/DIAG INJ IV PUSH: CPT

## 2018-12-08 PROCEDURE — 84703 CHORIONIC GONADOTROPIN ASSAY: CPT | Performed by: EMERGENCY MEDICINE

## 2018-12-08 PROCEDURE — 25010000002 MORPHINE PER 10 MG: Performed by: EMERGENCY MEDICINE

## 2018-12-08 RX ORDER — DULOXETIN HYDROCHLORIDE 60 MG/1
60 CAPSULE, DELAYED RELEASE ORAL NIGHTLY
COMMUNITY
End: 2019-06-18

## 2018-12-08 RX ORDER — OMEPRAZOLE 20 MG/1
20 CAPSULE, DELAYED RELEASE ORAL
COMMUNITY
End: 2021-01-20

## 2018-12-08 RX ORDER — SODIUM CHLORIDE 0.9 % (FLUSH) 0.9 %
10 SYRINGE (ML) INJECTION AS NEEDED
Status: DISCONTINUED | OUTPATIENT
Start: 2018-12-08 | End: 2018-12-08 | Stop reason: HOSPADM

## 2018-12-08 RX ORDER — TIZANIDINE 4 MG/1
4 TABLET ORAL 3 TIMES DAILY
COMMUNITY
End: 2021-01-20

## 2018-12-08 RX ORDER — MORPHINE SULFATE 2 MG/ML
4 INJECTION, SOLUTION INTRAMUSCULAR; INTRAVENOUS ONCE
Status: COMPLETED | OUTPATIENT
Start: 2018-12-08 | End: 2018-12-08

## 2018-12-08 RX ORDER — ONDANSETRON 2 MG/ML
4 INJECTION INTRAMUSCULAR; INTRAVENOUS ONCE
Status: COMPLETED | OUTPATIENT
Start: 2018-12-08 | End: 2018-12-08

## 2018-12-08 RX ADMIN — MORPHINE SULFATE 4 MG: 2 INJECTION, SOLUTION INTRAMUSCULAR; INTRAVENOUS at 19:14

## 2018-12-08 RX ADMIN — MORPHINE SULFATE 4 MG: 2 INJECTION, SOLUTION INTRAMUSCULAR; INTRAVENOUS at 17:51

## 2018-12-08 RX ADMIN — SODIUM CHLORIDE 1000 ML: 9 INJECTION, SOLUTION INTRAVENOUS at 17:54

## 2018-12-08 RX ADMIN — ONDANSETRON 4 MG: 2 INJECTION, SOLUTION INTRAMUSCULAR; INTRAVENOUS at 17:53

## 2018-12-08 NOTE — ED PROVIDER NOTES
Subjective   33-year-old white female complains of right flank pain.  Patient states that she's had 2 day history of bilateral back pain with more pronounced right flank pain radiating to the right lower abdomen.  She has a history of kidney stones and this feels like similar symptoms.  She also reports hematuria and hesitancy with urination.  She denies any fever, chills, but has had some nausea and vomiting which is worse after eating.  She states that she been eating and drinking well and feels that her mouth was dry.            Review of Systems   All other systems reviewed and are negative.      Past Medical History:   Diagnosis Date   • Depression    • Diabetes mellitus (CMS/HCC)    • DVT (deep venous thrombosis) (CMS/HCC)    • GERD (gastroesophageal reflux disease)    • Gout    • Hyperlipidemia    • Hypertension    • Hypothyroid    • Kidney stone    • Migraine    • Neuropathy    • PE (pulmonary embolism)        Allergies   Allergen Reactions   • Amoxicillin Hives   • Penicillins Hives   • Toradol [Ketorolac Tromethamine] Hives   • Tramadol Swelling       Past Surgical History:   Procedure Laterality Date   • CARDIAC SURGERY      Heart Cath done in    •  SECTION     • CHOLECYSTECTOMY     • COLONOSCOPY         Family History   Problem Relation Age of Onset   • No Known Problems Mother    • No Known Problems Father    • No Known Problems Sister    • No Known Problems Brother    • No Known Problems Son    • No Known Problems Daughter    • No Known Problems Maternal Grandmother    • No Known Problems Maternal Grandfather    • No Known Problems Paternal Grandmother    • No Known Problems Paternal Grandfather    • No Known Problems Cousin    • Rheum arthritis Neg Hx    • Osteoarthritis Neg Hx    • Asthma Neg Hx    • Diabetes Neg Hx    • Heart failure Neg Hx    • Hyperlipidemia Neg Hx    • Hypertension Neg Hx    • Migraines Neg Hx    • Rashes / Skin problems Neg Hx    • Seizures Neg Hx    • Stroke Neg Hx     • Thyroid disease Neg Hx        Social History     Socioeconomic History   • Marital status:      Spouse name: Not on file   • Number of children: Not on file   • Years of education: Not on file   • Highest education level: Not on file   Tobacco Use   • Smoking status: Never Smoker   • Smokeless tobacco: Never Used   Substance and Sexual Activity   • Alcohol use: No   • Drug use: No   • Sexual activity: Defer           Objective   Physical Exam   Constitutional: She is oriented to person, place, and time. She appears well-developed and well-nourished.   HENT:   Head: Normocephalic and atraumatic.   Cardiovascular: Normal rate, regular rhythm and normal heart sounds. Exam reveals no friction rub.   No murmur heard.  Pulmonary/Chest: Effort normal and breath sounds normal. No stridor. No respiratory distress. She has no wheezes. She has no rales.   Abdominal: Soft. Bowel sounds are normal. She exhibits no distension. There is no tenderness. There is CVA tenderness. There is no guarding.   Right flank mildly tender.  Abdomen nontender.   Musculoskeletal: Normal range of motion. She exhibits no edema.   Neurological: She is alert and oriented to person, place, and time.   Skin: Skin is warm and dry.   Psychiatric: She has a normal mood and affect.   Nursing note and vitals reviewed.      Procedures       Results for orders placed or performed during the hospital encounter of 12/08/18   Comprehensive Metabolic Panel   Result Value Ref Range    Glucose 165 (H) 70 - 110 mg/dL    BUN 8 7 - 21 mg/dL    Creatinine 0.55 0.43 - 1.29 mg/dL    Sodium 140 135 - 153 mmol/L    Potassium 4.1 3.5 - 5.3 mmol/L    Chloride 109 99 - 112 mmol/L    CO2 22.4 (L) 24.3 - 31.9 mmol/L    Calcium 9.3 7.7 - 10.0 mg/dL    Total Protein 7.1 6.0 - 8.0 g/dL    Albumin 4.40 3.50 - 5.00 g/dL    ALT (SGPT) 25 10 - 36 U/L    AST (SGOT) 19 10 - 30 U/L    Alkaline Phosphatase 62 35 - 104 U/L    Total Bilirubin 0.3 0.2 - 1.8 mg/dL    eGFR Non   Amer 128 >60 mL/min/1.73    Globulin 2.7 gm/dL    A/G Ratio 1.6 1.5 - 2.5 g/dL    BUN/Creatinine Ratio 14.5 7.0 - 25.0    Anion Gap 8.6 3.6 - 11.2 mmol/L   Lipase   Result Value Ref Range    Lipase 43 13 - 60 U/L   Urinalysis With Microscopic If Indicated (No Culture) - Urine, Clean Catch   Result Value Ref Range    Color, UA Red (A) Yellow, Straw    Appearance, UA Turbid (A) Clear    pH, UA 8.0 5.0 - 8.0    Specific Gravity, UA 1.018 1.005 - 1.030    Glucose, UA Negative Negative    Ketones, UA Negative Negative    Bilirubin, UA Negative Negative    Blood, UA Large (3+) (A) Negative    Protein, UA 30 mg/dL (1+) (A) Negative    Leuk Esterase, UA Trace (A) Negative    Nitrite, UA Negative Negative    Urobilinogen, UA 0.2 E.U./dL 0.2 - 1.0 E.U./dL   hCG, Serum, Qualitative   Result Value Ref Range    HCG Qualitative Negative Negative   CBC Auto Differential   Result Value Ref Range    WBC 4.90 4.50 - 12.50 10*3/mm3    RBC 3.60 (L) 4.20 - 5.40 10*6/mm3    Hemoglobin 10.8 (L) 12.0 - 16.0 g/dL    Hematocrit 33.3 (L) 37.0 - 47.0 %    MCV 92.5 80.0 - 94.0 fL    MCH 30.0 27.0 - 33.0 pg    MCHC 32.4 (L) 33.0 - 37.0 g/dL    RDW 13.6 11.5 - 14.5 %    RDW-SD 44.1 37.0 - 54.0 fl    MPV 9.0 6.0 - 10.0 fL    Platelets 220 130 - 400 10*3/mm3    Neutrophil % 44.0 30.0 - 70.0 %    Lymphocyte % 43.3 21.0 - 51.0 %    Monocyte % 7.8 0.0 - 10.0 %    Eosinophil % 4.1 0.0 - 5.0 %    Basophil % 0.6 0.0 - 2.0 %    Immature Grans % 0.2 0.0 - 0.5 %    Neutrophils, Absolute 2.16 1.40 - 6.50 10*3/mm3    Lymphocytes, Absolute 2.12 1.00 - 3.00 10*3/mm3    Monocytes, Absolute 0.38 0.10 - 0.90 10*3/mm3    Eosinophils, Absolute 0.20 0.00 - 0.70 10*3/mm3    Basophils, Absolute 0.03 0.00 - 0.30 10*3/mm3    Immature Grans, Absolute 0.01 0.00 - 0.03 10*3/mm3   Osmolality, Calculated   Result Value Ref Range    Osmolality Calc 281.4 273.0 - 305.0 mOsm/kg   Protime-INR   Result Value Ref Range    Protime 18.4 (H) 11.0 - 15.4 Seconds    INR 1.50 (H)  0.90 - 1.10   Urinalysis, Microscopic Only - Urine, Clean Catch   Result Value Ref Range    RBC, UA Too Numerous to Count (A) None Seen, 0-2 /HPF    WBC, UA 3-5 (A) None Seen, 0-2 /HPF    Bacteria, UA None Seen None Seen /HPF    Squamous Epithelial Cells, UA 3-6 (A) None Seen, 0-2 /HPF    Hyaline Casts, UA 3-6 None Seen /LPF    Amorphous Crystals, UA Large/3+ None Seen /HPF    Methodology Manual Light Microscopy    Light Blue Top   Result Value Ref Range    Extra Tube hold for add-on    Green Top (Gel)   Result Value Ref Range    Extra Tube Hold for add-ons.    Lavender Top   Result Value Ref Range    Extra Tube hold for add-on    Gold Top - SST   Result Value Ref Range    Extra Tube Hold for add-ons.          ED Course  ED Course as of Dec 08 1911   Sat Dec 08, 2018   1855 I discussed the patient and  results of workup.  She does have an ovarian cyst which is unchanged.  She has some hematuria, and so may have passed a stone.  She still complains of some pain.  We'll plan repeat pain medicine and discharged home.  [BC]   1910 Have further reviewed the patient's previous records and discussed this with the patient and .  Please see recent note from hospital in [place], [place].  Have told her that in the future would not give her opioids for this pain without proven kidney stone, and will have to have catheter UAs in the future to ensure valid specimen.  She voices understanding of this.  [BC]      ED Course User Index  [BC] Taras Shaw MD                  MDM  Number of Diagnoses or Management Options  Flank pain:   Hematuria, unspecified type:      Amount and/or Complexity of Data Reviewed  Clinical lab tests: reviewed  Tests in the radiology section of CPT®: reviewed  Independent visualization of images, tracings, or specimens: yes    Risk of Complications, Morbidity, and/or Mortality  Presenting problems: high  Diagnostic procedures: high  Management options: moderate          Final diagnoses:    Flank pain   Hematuria, unspecified type            Taras Shaw MD  12/08/18 1902       Taras Shaw MD  12/08/18 1911

## 2018-12-08 NOTE — ED NOTES
Patient reports bilateral flank pain x2 days; denies any fever/chills but reports nausea and vomiting. Reports hx of renal colic, last one being 7-8 months ago. Patient asking for pain medication.      Raphael Rivero RN  12/08/18 6536

## 2018-12-30 ENCOUNTER — HOSPITAL ENCOUNTER (EMERGENCY)
Age: 33
Discharge: LEFT BEFORE DISCHARGE | End: 2018-12-30
Payer: MEDICAID

## 2018-12-30 PROCEDURE — 4500000002 HC ER NO CHARGE

## 2019-01-26 ENCOUNTER — APPOINTMENT (OUTPATIENT)
Dept: GENERAL RADIOLOGY | Facility: HOSPITAL | Age: 34
End: 2019-01-26

## 2019-01-26 ENCOUNTER — APPOINTMENT (OUTPATIENT)
Dept: CT IMAGING | Facility: HOSPITAL | Age: 34
End: 2019-01-26

## 2019-01-26 ENCOUNTER — HOSPITAL ENCOUNTER (EMERGENCY)
Facility: HOSPITAL | Age: 34
Discharge: LEFT BEFORE DISCHARGE | End: 2019-01-27
Attending: GENERAL ACUTE CARE HOSPITAL | Admitting: GENERAL ACUTE CARE HOSPITAL

## 2019-01-26 DIAGNOSIS — R07.9 CHEST PAIN, UNSPECIFIED TYPE: Primary | ICD-10-CM

## 2019-01-26 LAB
ALBUMIN SERPL-MCNC: 4.2 G/DL (ref 3.5–5)
ALBUMIN/GLOB SERPL: 1.4 G/DL (ref 1.5–2.5)
ALP SERPL-CCNC: 71 U/L (ref 35–104)
ALT SERPL W P-5'-P-CCNC: 35 U/L (ref 10–36)
ANION GAP SERPL CALCULATED.3IONS-SCNC: 11.1 MMOL/L (ref 3.6–11.2)
APTT PPP: 26.7 SECONDS (ref 23.8–36.1)
AST SERPL-CCNC: 38 U/L (ref 10–30)
B-HCG UR QL: NEGATIVE
BACTERIA UR QL AUTO: ABNORMAL /HPF
BASOPHILS # BLD AUTO: 0.01 10*3/MM3 (ref 0–0.3)
BASOPHILS NFR BLD AUTO: 0.2 % (ref 0–2)
BILIRUB SERPL-MCNC: 0.5 MG/DL (ref 0.2–1.8)
BILIRUB UR QL STRIP: NEGATIVE
BUN BLD-MCNC: 11 MG/DL (ref 7–21)
BUN/CREAT SERPL: 18.3 (ref 7–25)
CALCIUM SPEC-SCNC: 9.4 MG/DL (ref 7.7–10)
CHLORIDE SERPL-SCNC: 102 MMOL/L (ref 99–112)
CLARITY UR: CLEAR
CO2 SERPL-SCNC: 21.9 MMOL/L (ref 24.3–31.9)
COLOR UR: YELLOW
CREAT BLD-MCNC: 0.6 MG/DL (ref 0.43–1.29)
DEPRECATED RDW RBC AUTO: 42.9 FL (ref 37–54)
EOSINOPHIL # BLD AUTO: 0.23 10*3/MM3 (ref 0–0.7)
EOSINOPHIL NFR BLD AUTO: 4.9 % (ref 0–5)
ERYTHROCYTE [DISTWIDTH] IN BLOOD BY AUTOMATED COUNT: 14.3 % (ref 11.5–14.5)
GFR SERPL CREATININE-BSD FRML MDRD: 116 ML/MIN/1.73
GLOBULIN UR ELPH-MCNC: 3.1 GM/DL
GLUCOSE BLD-MCNC: 271 MG/DL (ref 70–110)
GLUCOSE UR STRIP-MCNC: ABNORMAL MG/DL
HCT VFR BLD AUTO: 30.8 % (ref 37–47)
HGB BLD-MCNC: 10.2 G/DL (ref 12–16)
HGB UR QL STRIP.AUTO: NEGATIVE
HYALINE CASTS UR QL AUTO: ABNORMAL /LPF
IMM GRANULOCYTES # BLD AUTO: 0.02 10*3/MM3 (ref 0–0.03)
IMM GRANULOCYTES NFR BLD AUTO: 0.4 % (ref 0–0.5)
INR PPP: 1.16 (ref 0.9–1.1)
KETONES UR QL STRIP: ABNORMAL
LEUKOCYTE ESTERASE UR QL STRIP.AUTO: NEGATIVE
LYMPHOCYTES # BLD AUTO: 1 10*3/MM3 (ref 1–3)
LYMPHOCYTES NFR BLD AUTO: 21.3 % (ref 21–51)
MCH RBC QN AUTO: 28.7 PG (ref 27–33)
MCHC RBC AUTO-ENTMCNC: 33.1 G/DL (ref 33–37)
MCV RBC AUTO: 86.5 FL (ref 80–94)
MONOCYTES # BLD AUTO: 0.31 10*3/MM3 (ref 0.1–0.9)
MONOCYTES NFR BLD AUTO: 6.6 % (ref 0–10)
NEUTROPHILS # BLD AUTO: 3.13 10*3/MM3 (ref 1.4–6.5)
NEUTROPHILS NFR BLD AUTO: 66.6 % (ref 30–70)
NITRITE UR QL STRIP: NEGATIVE
OSMOLALITY SERPL CALC.SUM OF ELEC: 279.1 MOSM/KG (ref 273–305)
PH UR STRIP.AUTO: <=5 [PH] (ref 5–8)
PLATELET # BLD AUTO: 227 10*3/MM3 (ref 130–400)
PMV BLD AUTO: 9.1 FL (ref 6–10)
POTASSIUM BLD-SCNC: 3.7 MMOL/L (ref 3.5–5.3)
PROT SERPL-MCNC: 7.3 G/DL (ref 6–8)
PROT UR QL STRIP: ABNORMAL
PROTHROMBIN TIME: 15.1 SECONDS (ref 11–15.4)
RBC # BLD AUTO: 3.56 10*6/MM3 (ref 4.2–5.4)
RBC # UR: ABNORMAL /HPF
REF LAB TEST METHOD: ABNORMAL
SODIUM BLD-SCNC: 135 MMOL/L (ref 135–153)
SP GR UR STRIP: 1.03 (ref 1–1.03)
SQUAMOUS #/AREA URNS HPF: ABNORMAL /HPF
TROPONIN I SERPL-MCNC: <0.006 NG/ML
UROBILINOGEN UR QL STRIP: ABNORMAL
WBC NRBC COR # BLD: 4.7 10*3/MM3 (ref 4.5–12.5)
WBC UR QL AUTO: ABNORMAL /HPF

## 2019-01-26 PROCEDURE — 84484 ASSAY OF TROPONIN QUANT: CPT | Performed by: GENERAL ACUTE CARE HOSPITAL

## 2019-01-26 PROCEDURE — 36415 COLL VENOUS BLD VENIPUNCTURE: CPT

## 2019-01-26 PROCEDURE — 0 IOPAMIDOL PER 1 ML: Performed by: GENERAL ACUTE CARE HOSPITAL

## 2019-01-26 PROCEDURE — 71275 CT ANGIOGRAPHY CHEST: CPT | Performed by: RADIOLOGY

## 2019-01-26 PROCEDURE — 99285 EMERGENCY DEPT VISIT HI MDM: CPT

## 2019-01-26 PROCEDURE — 80053 COMPREHEN METABOLIC PANEL: CPT | Performed by: GENERAL ACUTE CARE HOSPITAL

## 2019-01-26 PROCEDURE — 93010 ELECTROCARDIOGRAM REPORT: CPT | Performed by: INTERNAL MEDICINE

## 2019-01-26 PROCEDURE — 81001 URINALYSIS AUTO W/SCOPE: CPT | Performed by: GENERAL ACUTE CARE HOSPITAL

## 2019-01-26 PROCEDURE — 93005 ELECTROCARDIOGRAM TRACING: CPT | Performed by: GENERAL ACUTE CARE HOSPITAL

## 2019-01-26 PROCEDURE — 85730 THROMBOPLASTIN TIME PARTIAL: CPT | Performed by: GENERAL ACUTE CARE HOSPITAL

## 2019-01-26 PROCEDURE — 71275 CT ANGIOGRAPHY CHEST: CPT

## 2019-01-26 PROCEDURE — 85610 PROTHROMBIN TIME: CPT | Performed by: GENERAL ACUTE CARE HOSPITAL

## 2019-01-26 PROCEDURE — 71045 X-RAY EXAM CHEST 1 VIEW: CPT

## 2019-01-26 PROCEDURE — 81025 URINE PREGNANCY TEST: CPT | Performed by: GENERAL ACUTE CARE HOSPITAL

## 2019-01-26 PROCEDURE — 85025 COMPLETE CBC W/AUTO DIFF WBC: CPT | Performed by: GENERAL ACUTE CARE HOSPITAL

## 2019-01-26 PROCEDURE — 71045 X-RAY EXAM CHEST 1 VIEW: CPT | Performed by: RADIOLOGY

## 2019-01-26 RX ORDER — KETOROLAC TROMETHAMINE 30 MG/ML
10 INJECTION, SOLUTION INTRAMUSCULAR; INTRAVENOUS ONCE
Status: DISCONTINUED | OUTPATIENT
Start: 2019-01-26 | End: 2019-01-27 | Stop reason: HOSPADM

## 2019-01-26 RX ORDER — KETOROLAC TROMETHAMINE 30 MG/ML
10 INJECTION, SOLUTION INTRAMUSCULAR; INTRAVENOUS ONCE
Status: DISCONTINUED | OUTPATIENT
Start: 2019-01-26 | End: 2019-01-26

## 2019-01-26 RX ORDER — WARFARIN SODIUM 5 MG/1
10 TABLET ORAL
Status: COMPLETED | OUTPATIENT
Start: 2019-01-26 | End: 2019-01-26

## 2019-01-26 RX ORDER — DIPHENHYDRAMINE HYDROCHLORIDE 50 MG/ML
25 INJECTION INTRAMUSCULAR; INTRAVENOUS ONCE
Status: DISCONTINUED | OUTPATIENT
Start: 2019-01-26 | End: 2019-01-27 | Stop reason: HOSPADM

## 2019-01-26 RX ADMIN — IOPAMIDOL 80 ML: 755 INJECTION, SOLUTION INTRAVENOUS at 23:51

## 2019-01-26 RX ADMIN — WARFARIN SODIUM 10 MG: 5 TABLET ORAL at 23:04

## 2019-01-27 VITALS
DIASTOLIC BLOOD PRESSURE: 89 MMHG | TEMPERATURE: 98.5 F | HEART RATE: 88 BPM | RESPIRATION RATE: 20 BRPM | SYSTOLIC BLOOD PRESSURE: 155 MMHG | OXYGEN SATURATION: 97 %

## 2019-01-29 ENCOUNTER — HOSPITAL ENCOUNTER (EMERGENCY)
Facility: HOSPITAL | Age: 34
Discharge: HOME | End: 2019-01-29
Attending: FAMILY MEDICINE | Admitting: EMERGENCY MEDICINE

## 2019-01-29 ENCOUNTER — APPOINTMENT (OUTPATIENT)
Dept: CT IMAGING | Facility: HOSPITAL | Age: 34
End: 2019-01-29

## 2019-01-29 ENCOUNTER — APPOINTMENT (OUTPATIENT)
Dept: ULTRASOUND IMAGING | Facility: HOSPITAL | Age: 34
End: 2019-01-29

## 2019-01-29 VITALS
TEMPERATURE: 98.7 F | SYSTOLIC BLOOD PRESSURE: 158 MMHG | HEART RATE: 97 BPM | BODY MASS INDEX: 50.02 KG/M2 | WEIGHT: 293 LBS | RESPIRATION RATE: 18 BRPM | HEIGHT: 64 IN | OXYGEN SATURATION: 98 % | DIASTOLIC BLOOD PRESSURE: 94 MMHG

## 2019-01-29 DIAGNOSIS — R31.0 GROSS HEMATURIA: ICD-10-CM

## 2019-01-29 DIAGNOSIS — N94.89 ADNEXAL MASS: Primary | ICD-10-CM

## 2019-01-29 LAB
ALBUMIN SERPL-MCNC: 4.7 G/DL (ref 3.5–5)
ALBUMIN/GLOB SERPL: 1.4 G/DL (ref 1.5–2.5)
ALP SERPL-CCNC: 74 U/L (ref 35–104)
ALT SERPL W P-5'-P-CCNC: 40 U/L (ref 10–36)
ANION GAP SERPL CALCULATED.3IONS-SCNC: 10 MMOL/L (ref 3.6–11.2)
AST SERPL-CCNC: 48 U/L (ref 10–30)
B-HCG UR QL: NEGATIVE
BACTERIA UR QL AUTO: ABNORMAL /HPF
BASOPHILS # BLD AUTO: 0.03 10*3/MM3 (ref 0–0.3)
BASOPHILS NFR BLD AUTO: 0.6 % (ref 0–2)
BILIRUB SERPL-MCNC: 0.4 MG/DL (ref 0.2–1.8)
BILIRUB UR QL STRIP: ABNORMAL
BUN BLD-MCNC: 11 MG/DL (ref 7–21)
BUN/CREAT SERPL: 17.5 (ref 7–25)
CALCIUM SPEC-SCNC: 10.1 MG/DL (ref 7.7–10)
CHLORIDE SERPL-SCNC: 104 MMOL/L (ref 99–112)
CLARITY UR: ABNORMAL
CO2 SERPL-SCNC: 23 MMOL/L (ref 24.3–31.9)
COLOR UR: ABNORMAL
CREAT BLD-MCNC: 0.63 MG/DL (ref 0.43–1.29)
DEPRECATED RDW RBC AUTO: 45.5 FL (ref 37–54)
EOSINOPHIL # BLD AUTO: 0.24 10*3/MM3 (ref 0–0.7)
EOSINOPHIL NFR BLD AUTO: 4.7 % (ref 0–5)
ERYTHROCYTE [DISTWIDTH] IN BLOOD BY AUTOMATED COUNT: 14.9 % (ref 11.5–14.5)
GFR SERPL CREATININE-BSD FRML MDRD: 110 ML/MIN/1.73
GLOBULIN UR ELPH-MCNC: 3.3 GM/DL
GLUCOSE BLD-MCNC: 212 MG/DL (ref 70–110)
GLUCOSE UR STRIP-MCNC: NEGATIVE MG/DL
HCT VFR BLD AUTO: 34.1 % (ref 37–47)
HGB BLD-MCNC: 11.1 G/DL (ref 12–16)
HGB UR QL STRIP.AUTO: ABNORMAL
HYALINE CASTS UR QL AUTO: ABNORMAL /LPF
IMM GRANULOCYTES # BLD AUTO: 0.01 10*3/MM3 (ref 0–0.03)
IMM GRANULOCYTES NFR BLD AUTO: 0.2 % (ref 0–0.5)
INR PPP: 1.83 (ref 0.9–1.1)
KETONES UR QL STRIP: NEGATIVE
LEUKOCYTE ESTERASE UR QL STRIP.AUTO: ABNORMAL
LIPASE SERPL-CCNC: 45 U/L (ref 13–60)
LYMPHOCYTES # BLD AUTO: 1.29 10*3/MM3 (ref 1–3)
LYMPHOCYTES NFR BLD AUTO: 25.5 % (ref 21–51)
MCH RBC QN AUTO: 27.8 PG (ref 27–33)
MCHC RBC AUTO-ENTMCNC: 32.6 G/DL (ref 33–37)
MCV RBC AUTO: 85.3 FL (ref 80–94)
MONOCYTES # BLD AUTO: 0.49 10*3/MM3 (ref 0.1–0.9)
MONOCYTES NFR BLD AUTO: 9.7 % (ref 0–10)
NEUTROPHILS # BLD AUTO: 3 10*3/MM3 (ref 1.4–6.5)
NEUTROPHILS NFR BLD AUTO: 59.3 % (ref 30–70)
NITRITE UR QL STRIP: POSITIVE
OSMOLALITY SERPL CALC.SUM OF ELEC: 279.5 MOSM/KG (ref 273–305)
PH UR STRIP.AUTO: 5.5 [PH] (ref 5–8)
PLATELET # BLD AUTO: 250 10*3/MM3 (ref 130–400)
PMV BLD AUTO: 9.7 FL (ref 6–10)
POTASSIUM BLD-SCNC: 3.8 MMOL/L (ref 3.5–5.3)
PROT SERPL-MCNC: 8 G/DL (ref 6–8)
PROT UR QL STRIP: ABNORMAL
PROTHROMBIN TIME: 21.4 SECONDS (ref 11–15.4)
RBC # BLD AUTO: 4 10*6/MM3 (ref 4.2–5.4)
RBC # UR: ABNORMAL /HPF
REF LAB TEST METHOD: ABNORMAL
SODIUM BLD-SCNC: 137 MMOL/L (ref 135–153)
SP GR UR STRIP: 1.03 (ref 1–1.03)
SQUAMOUS #/AREA URNS HPF: ABNORMAL /HPF
UROBILINOGEN UR QL STRIP: ABNORMAL
WBC NRBC COR # BLD: 5.06 10*3/MM3 (ref 4.5–12.5)
WBC UR QL AUTO: ABNORMAL /HPF

## 2019-01-29 PROCEDURE — 81001 URINALYSIS AUTO W/SCOPE: CPT | Performed by: FAMILY MEDICINE

## 2019-01-29 PROCEDURE — 80053 COMPREHEN METABOLIC PANEL: CPT | Performed by: FAMILY MEDICINE

## 2019-01-29 PROCEDURE — 76830 TRANSVAGINAL US NON-OB: CPT | Performed by: RADIOLOGY

## 2019-01-29 PROCEDURE — 85025 COMPLETE CBC W/AUTO DIFF WBC: CPT | Performed by: FAMILY MEDICINE

## 2019-01-29 PROCEDURE — 74176 CT ABD & PELVIS W/O CONTRAST: CPT

## 2019-01-29 PROCEDURE — 96361 HYDRATE IV INFUSION ADD-ON: CPT

## 2019-01-29 PROCEDURE — 76830 TRANSVAGINAL US NON-OB: CPT

## 2019-01-29 PROCEDURE — 99283 EMERGENCY DEPT VISIT LOW MDM: CPT

## 2019-01-29 PROCEDURE — 85610 PROTHROMBIN TIME: CPT | Performed by: PHYSICIAN ASSISTANT

## 2019-01-29 PROCEDURE — 96374 THER/PROPH/DIAG INJ IV PUSH: CPT

## 2019-01-29 PROCEDURE — 96376 TX/PRO/DX INJ SAME DRUG ADON: CPT

## 2019-01-29 PROCEDURE — 74176 CT ABD & PELVIS W/O CONTRAST: CPT | Performed by: RADIOLOGY

## 2019-01-29 PROCEDURE — 81025 URINE PREGNANCY TEST: CPT | Performed by: FAMILY MEDICINE

## 2019-01-29 PROCEDURE — 96375 TX/PRO/DX INJ NEW DRUG ADDON: CPT

## 2019-01-29 PROCEDURE — 25010000002 HYDROMORPHONE PER 4 MG: Performed by: FAMILY MEDICINE

## 2019-01-29 PROCEDURE — 25010000002 ONDANSETRON PER 1 MG: Performed by: FAMILY MEDICINE

## 2019-01-29 PROCEDURE — 83690 ASSAY OF LIPASE: CPT | Performed by: FAMILY MEDICINE

## 2019-01-29 RX ORDER — HYDROMORPHONE HYDROCHLORIDE 1 MG/ML
0.5 INJECTION, SOLUTION INTRAMUSCULAR; INTRAVENOUS; SUBCUTANEOUS ONCE
Status: COMPLETED | OUTPATIENT
Start: 2019-01-29 | End: 2019-01-29

## 2019-01-29 RX ORDER — SODIUM CHLORIDE 0.9 % (FLUSH) 0.9 %
10 SYRINGE (ML) INJECTION AS NEEDED
Status: DISCONTINUED | OUTPATIENT
Start: 2019-01-29 | End: 2019-01-29 | Stop reason: HOSPADM

## 2019-01-29 RX ORDER — ONDANSETRON 2 MG/ML
4 INJECTION INTRAMUSCULAR; INTRAVENOUS ONCE
Status: COMPLETED | OUTPATIENT
Start: 2019-01-29 | End: 2019-01-29

## 2019-01-29 RX ORDER — HYDROCODONE BITARTRATE AND ACETAMINOPHEN 10; 325 MG/1; MG/1
1 TABLET ORAL ONCE
Status: COMPLETED | OUTPATIENT
Start: 2019-01-29 | End: 2019-01-29

## 2019-01-29 RX ADMIN — HYDROMORPHONE HYDROCHLORIDE 0.5 MG: 1 INJECTION, SOLUTION INTRAMUSCULAR; INTRAVENOUS; SUBCUTANEOUS at 12:06

## 2019-01-29 RX ADMIN — HYDROMORPHONE HYDROCHLORIDE 0.5 MG: 1 INJECTION, SOLUTION INTRAMUSCULAR; INTRAVENOUS; SUBCUTANEOUS at 13:28

## 2019-01-29 RX ADMIN — HYDROCODONE BITARTRATE AND ACETAMINOPHEN 1 TABLET: 10; 325 TABLET ORAL at 15:31

## 2019-01-29 RX ADMIN — ONDANSETRON 4 MG: 2 INJECTION, SOLUTION INTRAMUSCULAR; INTRAVENOUS at 12:05

## 2019-01-29 RX ADMIN — SODIUM CHLORIDE 1000 ML: 9 INJECTION, SOLUTION INTRAVENOUS at 12:05

## 2019-01-31 ENCOUNTER — APPOINTMENT (OUTPATIENT)
Dept: GENERAL RADIOLOGY | Facility: HOSPITAL | Age: 34
End: 2019-01-31

## 2019-01-31 ENCOUNTER — HOSPITAL ENCOUNTER (EMERGENCY)
Facility: HOSPITAL | Age: 34
Discharge: HOME | End: 2019-01-31
Attending: EMERGENCY MEDICINE | Admitting: EMERGENCY MEDICINE

## 2019-01-31 VITALS
TEMPERATURE: 98 F | BODY MASS INDEX: 50.02 KG/M2 | RESPIRATION RATE: 18 BRPM | WEIGHT: 293 LBS | HEIGHT: 64 IN | SYSTOLIC BLOOD PRESSURE: 122 MMHG | OXYGEN SATURATION: 97 % | DIASTOLIC BLOOD PRESSURE: 90 MMHG | HEART RATE: 74 BPM

## 2019-01-31 DIAGNOSIS — S39.012A LOW BACK STRAIN, INITIAL ENCOUNTER: ICD-10-CM

## 2019-01-31 DIAGNOSIS — S80.00XA CONTUSION OF KNEE, INITIAL ENCOUNTER: Primary | ICD-10-CM

## 2019-01-31 PROCEDURE — 73560 X-RAY EXAM OF KNEE 1 OR 2: CPT

## 2019-01-31 PROCEDURE — 73562 X-RAY EXAM OF KNEE 3: CPT

## 2019-01-31 PROCEDURE — 73562 X-RAY EXAM OF KNEE 3: CPT | Performed by: RADIOLOGY

## 2019-01-31 PROCEDURE — 96372 THER/PROPH/DIAG INJ SC/IM: CPT

## 2019-01-31 PROCEDURE — 25010000002 ORPHENADRINE CITRATE PER 60 MG: Performed by: EMERGENCY MEDICINE

## 2019-01-31 PROCEDURE — 99283 EMERGENCY DEPT VISIT LOW MDM: CPT

## 2019-01-31 RX ORDER — DIFLUNISAL 500 MG/1
500 TABLET, FILM COATED ORAL 2 TIMES DAILY
Qty: 20 TABLET | Refills: 0 | Status: SHIPPED | OUTPATIENT
Start: 2019-01-31 | End: 2019-04-25

## 2019-01-31 RX ORDER — ORPHENADRINE CITRATE 30 MG/ML
60 INJECTION INTRAMUSCULAR; INTRAVENOUS ONCE
Status: COMPLETED | OUTPATIENT
Start: 2019-01-31 | End: 2019-01-31

## 2019-01-31 RX ORDER — METAXALONE 800 MG/1
800 TABLET ORAL 3 TIMES DAILY PRN
Qty: 15 TABLET | Refills: 0 | Status: SHIPPED | OUTPATIENT
Start: 2019-01-31 | End: 2019-04-25

## 2019-01-31 RX ADMIN — ORPHENADRINE CITRATE 60 MG: 30 INJECTION INTRAMUSCULAR; INTRAVENOUS at 03:27

## 2019-02-05 ENCOUNTER — HOSPITAL ENCOUNTER (EMERGENCY)
Facility: HOSPITAL | Age: 34
Discharge: HOME | End: 2019-02-05
Attending: EMERGENCY MEDICINE | Admitting: EMERGENCY MEDICINE

## 2019-02-05 VITALS
HEART RATE: 88 BPM | HEIGHT: 64 IN | TEMPERATURE: 98 F | BODY MASS INDEX: 50.02 KG/M2 | WEIGHT: 293 LBS | RESPIRATION RATE: 18 BRPM | DIASTOLIC BLOOD PRESSURE: 89 MMHG | SYSTOLIC BLOOD PRESSURE: 148 MMHG | OXYGEN SATURATION: 99 %

## 2019-02-05 DIAGNOSIS — R10.9 LEFT FLANK PAIN: ICD-10-CM

## 2019-02-05 DIAGNOSIS — N83.8 OVARIAN MASS, RIGHT: Primary | ICD-10-CM

## 2019-02-05 LAB
ALBUMIN SERPL-MCNC: 4.6 G/DL (ref 3.5–5)
ALBUMIN/GLOB SERPL: 1.4 G/DL (ref 1.5–2.5)
ALP SERPL-CCNC: 68 U/L (ref 35–104)
ALT SERPL W P-5'-P-CCNC: 40 U/L (ref 10–36)
ANION GAP SERPL CALCULATED.3IONS-SCNC: 11.9 MMOL/L (ref 3.6–11.2)
AST SERPL-CCNC: 63 U/L (ref 10–30)
BACTERIA UR QL AUTO: ABNORMAL /HPF
BASOPHILS # BLD AUTO: 0.01 10*3/MM3 (ref 0–0.3)
BASOPHILS NFR BLD AUTO: 0.2 % (ref 0–2)
BILIRUB SERPL-MCNC: 0.5 MG/DL (ref 0.2–1.8)
BILIRUB UR QL STRIP: ABNORMAL
BILIRUB UR QL STRIP: NEGATIVE
BUN BLD-MCNC: 11 MG/DL (ref 7–21)
BUN/CREAT SERPL: 18.6 (ref 7–25)
CALCIUM SPEC-SCNC: 9.6 MG/DL (ref 7.7–10)
CHLORIDE SERPL-SCNC: 101 MMOL/L (ref 99–112)
CLARITY UR: ABNORMAL
CLARITY UR: CLEAR
CO2 SERPL-SCNC: 23.1 MMOL/L (ref 24.3–31.9)
COLOR UR: ABNORMAL
COLOR UR: ABNORMAL
CREAT BLD-MCNC: 0.59 MG/DL (ref 0.43–1.29)
DEPRECATED RDW RBC AUTO: 43.1 FL (ref 37–54)
EOSINOPHIL # BLD AUTO: 0.22 10*3/MM3 (ref 0–0.7)
EOSINOPHIL NFR BLD AUTO: 4 % (ref 0–5)
ERYTHROCYTE [DISTWIDTH] IN BLOOD BY AUTOMATED COUNT: 14.4 % (ref 11.5–14.5)
GFR SERPL CREATININE-BSD FRML MDRD: 118 ML/MIN/1.73
GLOBULIN UR ELPH-MCNC: 3.2 GM/DL
GLUCOSE BLD-MCNC: 160 MG/DL (ref 70–110)
GLUCOSE UR STRIP-MCNC: NEGATIVE MG/DL
GLUCOSE UR STRIP-MCNC: NEGATIVE MG/DL
HCG SERPL QL: NEGATIVE
HCT VFR BLD AUTO: 33 % (ref 37–47)
HGB BLD-MCNC: 10.7 G/DL (ref 12–16)
HGB UR QL STRIP.AUTO: ABNORMAL
HGB UR QL STRIP.AUTO: NEGATIVE
HOLD SPECIMEN: NORMAL
HOLD SPECIMEN: NORMAL
HYALINE CASTS UR QL AUTO: ABNORMAL /LPF
IMM GRANULOCYTES # BLD AUTO: 0.01 10*3/MM3 (ref 0–0.03)
IMM GRANULOCYTES NFR BLD AUTO: 0.2 % (ref 0–0.5)
INR PPP: 1.66 (ref 0.9–1.1)
KETONES UR QL STRIP: ABNORMAL
KETONES UR QL STRIP: NEGATIVE
LEUKOCYTE ESTERASE UR QL STRIP.AUTO: ABNORMAL
LEUKOCYTE ESTERASE UR QL STRIP.AUTO: NEGATIVE
LYMPHOCYTES # BLD AUTO: 1.58 10*3/MM3 (ref 1–3)
LYMPHOCYTES NFR BLD AUTO: 29 % (ref 21–51)
MCH RBC QN AUTO: 27.6 PG (ref 27–33)
MCHC RBC AUTO-ENTMCNC: 32.4 G/DL (ref 33–37)
MCV RBC AUTO: 85.3 FL (ref 80–94)
MONOCYTES # BLD AUTO: 0.47 10*3/MM3 (ref 0.1–0.9)
MONOCYTES NFR BLD AUTO: 8.6 % (ref 0–10)
NEUTROPHILS # BLD AUTO: 3.15 10*3/MM3 (ref 1.4–6.5)
NEUTROPHILS NFR BLD AUTO: 58 % (ref 30–70)
NITRITE UR QL STRIP: NEGATIVE
NITRITE UR QL STRIP: POSITIVE
OSMOLALITY SERPL CALC.SUM OF ELEC: 274.8 MOSM/KG (ref 273–305)
PH UR STRIP.AUTO: <=5 [PH] (ref 5–8)
PH UR STRIP.AUTO: <=5 [PH] (ref 5–8)
PLATELET # BLD AUTO: 273 10*3/MM3 (ref 130–400)
PMV BLD AUTO: 9.3 FL (ref 6–10)
POTASSIUM BLD-SCNC: 4.5 MMOL/L (ref 3.5–5.3)
PROT SERPL-MCNC: 7.8 G/DL (ref 6–8)
PROT UR QL STRIP: ABNORMAL
PROT UR QL STRIP: NEGATIVE
PROTHROMBIN TIME: 19.8 SECONDS (ref 11–15.4)
RBC # BLD AUTO: 3.87 10*6/MM3 (ref 4.2–5.4)
RBC # UR: ABNORMAL /HPF
REF LAB TEST METHOD: ABNORMAL
SODIUM BLD-SCNC: 136 MMOL/L (ref 135–153)
SP GR UR STRIP: 1.03 (ref 1–1.03)
SP GR UR STRIP: >1.03 (ref 1–1.03)
SQUAMOUS #/AREA URNS HPF: ABNORMAL /HPF
UROBILINOGEN UR QL STRIP: ABNORMAL
UROBILINOGEN UR QL STRIP: ABNORMAL
WBC NRBC COR # BLD: 5.44 10*3/MM3 (ref 4.5–12.5)
WBC UR QL AUTO: ABNORMAL /HPF
WHOLE BLOOD HOLD SPECIMEN: NORMAL
WHOLE BLOOD HOLD SPECIMEN: NORMAL

## 2019-02-05 PROCEDURE — 25010000002 ONDANSETRON PER 1 MG: Performed by: EMERGENCY MEDICINE

## 2019-02-05 PROCEDURE — 99283 EMERGENCY DEPT VISIT LOW MDM: CPT

## 2019-02-05 PROCEDURE — 25010000002 MORPHINE PER 10 MG: Performed by: EMERGENCY MEDICINE

## 2019-02-05 PROCEDURE — 81003 URINALYSIS AUTO W/O SCOPE: CPT | Performed by: PHYSICIAN ASSISTANT

## 2019-02-05 PROCEDURE — 96375 TX/PRO/DX INJ NEW DRUG ADDON: CPT

## 2019-02-05 PROCEDURE — 85025 COMPLETE CBC W/AUTO DIFF WBC: CPT | Performed by: PHYSICIAN ASSISTANT

## 2019-02-05 PROCEDURE — 80053 COMPREHEN METABOLIC PANEL: CPT | Performed by: PHYSICIAN ASSISTANT

## 2019-02-05 PROCEDURE — 84703 CHORIONIC GONADOTROPIN ASSAY: CPT | Performed by: PHYSICIAN ASSISTANT

## 2019-02-05 PROCEDURE — P9612 CATHETERIZE FOR URINE SPEC: HCPCS

## 2019-02-05 PROCEDURE — 96361 HYDRATE IV INFUSION ADD-ON: CPT

## 2019-02-05 PROCEDURE — 81001 URINALYSIS AUTO W/SCOPE: CPT | Performed by: EMERGENCY MEDICINE

## 2019-02-05 PROCEDURE — 96374 THER/PROPH/DIAG INJ IV PUSH: CPT

## 2019-02-05 PROCEDURE — 85610 PROTHROMBIN TIME: CPT | Performed by: PHYSICIAN ASSISTANT

## 2019-02-05 PROCEDURE — 87086 URINE CULTURE/COLONY COUNT: CPT | Performed by: EMERGENCY MEDICINE

## 2019-02-05 RX ORDER — ONDANSETRON 2 MG/ML
4 INJECTION INTRAMUSCULAR; INTRAVENOUS ONCE
Status: COMPLETED | OUTPATIENT
Start: 2019-02-05 | End: 2019-02-05

## 2019-02-05 RX ORDER — SODIUM CHLORIDE 0.9 % (FLUSH) 0.9 %
10 SYRINGE (ML) INJECTION AS NEEDED
Status: DISCONTINUED | OUTPATIENT
Start: 2019-02-05 | End: 2019-02-05 | Stop reason: HOSPADM

## 2019-02-05 RX ADMIN — SODIUM CHLORIDE 1000 ML: 9 INJECTION, SOLUTION INTRAVENOUS at 08:52

## 2019-02-05 RX ADMIN — MORPHINE SULFATE 4 MG: 4 INJECTION INTRAVENOUS at 08:55

## 2019-02-05 RX ADMIN — ONDANSETRON 4 MG: 2 INJECTION INTRAMUSCULAR; INTRAVENOUS at 08:59

## 2019-02-05 NOTE — ED PROVIDER NOTES
Subjective   33-year-old white female presents secondary to left flank pain.  She states this started 2 days ago.  She's had blood in her urine.  She is concerned that she had a kidney stone.  She denies any fever.  She's had nausea though no vomiting she voices no other complaint.  Reviewed old records.  She's had multiple CTs recently.  She has followed up on her ovarian mass at [facility].            Review of Systems   Constitutional: Negative.  Negative for fever.   HENT: Negative.    Respiratory: Negative.    Cardiovascular: Negative.  Negative for chest pain.   Gastrointestinal: Positive for abdominal pain (Left flank) and nausea.   Endocrine: Negative.    Genitourinary: Positive for hematuria. Negative for dysuria.   Skin: Negative.    Neurological: Negative.    Psychiatric/Behavioral: Negative.    All other systems reviewed and are negative.      Past Medical History:   Diagnosis Date   • Depression    • Diabetes mellitus (CMS/HCC)    • DVT (deep venous thrombosis) (CMS/HCC)    • GERD (gastroesophageal reflux disease)    • Gout    • Hyperlipidemia    • Hypertension    • Hypothyroid    • Kidney stone    • Migraine    • Neuropathy    • PE (pulmonary embolism)        Allergies   Allergen Reactions   • Amoxicillin Hives   • Penicillins Hives   • Toradol [Ketorolac Tromethamine] Hives   • Tramadol Swelling       Past Surgical History:   Procedure Laterality Date   • CARDIAC SURGERY      Heart Cath done in    •  SECTION     • CHOLECYSTECTOMY     • COLONOSCOPY         Family History   Problem Relation Age of Onset   • No Known Problems Mother    • No Known Problems Father    • No Known Problems Sister    • No Known Problems Brother    • No Known Problems Son    • No Known Problems Daughter    • No Known Problems Maternal Grandmother    • No Known Problems Maternal Grandfather    • No Known Problems Paternal Grandmother    • No Known Problems Paternal Grandfather    • No Known Problems Cousin     • Rheum arthritis Neg Hx    • Osteoarthritis Neg Hx    • Asthma Neg Hx    • Diabetes Neg Hx    • Heart failure Neg Hx    • Hyperlipidemia Neg Hx    • Hypertension Neg Hx    • Migraines Neg Hx    • Rashes / Skin problems Neg Hx    • Seizures Neg Hx    • Stroke Neg Hx    • Thyroid disease Neg Hx        Social History     Socioeconomic History   • Marital status:      Spouse name: Not on file   • Number of children: Not on file   • Years of education: Not on file   • Highest education level: Not on file   Tobacco Use   • Smoking status: Never Smoker   • Smokeless tobacco: Never Used   Substance and Sexual Activity   • Alcohol use: No   • Drug use: No   • Sexual activity: Defer           Objective   Physical Exam   Constitutional: She is oriented to person, place, and time. She appears well-developed and well-nourished. No distress.   HENT:   Head: Normocephalic and atraumatic.   Right Ear: External ear normal.   Left Ear: External ear normal.   Nose: Nose normal.   Eyes: Conjunctivae and EOM are normal. Pupils are equal, round, and reactive to light.   Neck: Normal range of motion. Neck supple. No JVD present. No tracheal deviation present.   Cardiovascular: Normal rate, regular rhythm and normal heart sounds.   No murmur heard.  Pulmonary/Chest: Effort normal and breath sounds normal. No respiratory distress. She has no wheezes.   Abdominal: Soft. Bowel sounds are normal. There is no tenderness. There is no rebound and no guarding.   Musculoskeletal: Normal range of motion. She exhibits no edema or deformity.   Neurological: She is alert and oriented to person, place, and time. No cranial nerve deficit.   Skin: Skin is warm and dry. No rash noted. She is not diaphoretic. No erythema. No pallor.   Psychiatric: She has a normal mood and affect. Her behavior is normal. Thought content normal.   Nursing note and vitals reviewed.      Procedures           ED Course  ED Course as of Feb 05 1006   Tue Feb 05, 2019    0922 Patient originally submitted a grossly bloody urine specimen though upon catheter specimen her urine is totally normal.  She states that she's not had any vaginal bleeding and has not had a period in approximately 3 years.  She has followed up on her ovarian mass at [facility].  She states that she was evaluated one week ago and has follow-up.  She's been counseled about the need for follow-up on this.  [JI]   0928 Patient was counseled that her Coumadin level was subtherapeutic.  She states that she will follow up on an appointment today.  She is also to follow-up with [facility] regarding her ovarian mass.  She still denies being in [place] though there is a chart from 3 days ago with someone with her as central same medical history on her Epic records.  She has had multiple CTs recently.  Will not do that today.  She is to follow-up for an regarding her pain management as well.  [JI]      ED Course User Index  [JI] Roly Cope PA                  MDM  Number of Diagnoses or Management Options  Left flank pain: established and worsening  Ovarian mass, right: established and worsening     Amount and/or Complexity of Data Reviewed  Clinical lab tests: reviewed and ordered  Tests in the radiology section of CPT®: ordered and reviewed  Discuss the patient with other providers: yes    Risk of Complications, Morbidity, and/or Mortality  Presenting problems: moderate          Final diagnoses:   Ovarian mass, right   Left flank pain            Roly Cope PA  02/05/19 1006

## 2019-02-05 NOTE — ED NOTES
Urine speciman obtained via straight catheter at this time, urine noted to be clear/yellow.     Gail Mcghee RN  02/05/19 0827

## 2019-02-05 NOTE — ED NOTES
Urine specimen obtained via clean catch at this time, large amount of blood noted in urine, new order by pa to use straight catheter to obtain another specimen for confirmation     Gail Mcghee RN  02/05/19 0880

## 2019-02-07 LAB — BACTERIA SPEC AEROBE CULT: NORMAL

## 2019-02-16 NOTE — ED PROVIDER NOTES
Subjective   Pt here with CP again. Non specific pattern. Nothing makes it better or worse.  No diaphoresis, nausea vomiting, exertional component. Has history of PE?         Chest Pain   Pain location:  Substernal area  Pain quality: aching    Pain quality: not burning, not dull, not hot, not radiating, not sharp, not stabbing, not tearing and no tightness    Context: not breathing, not drug use, not intercourse, not lifting, not raising an arm, not at rest, not stress and not trauma    Associated symptoms: no abdominal pain, no AICD problem, no altered mental status, no dizziness, no dysphagia, no fatigue, no numbness, no orthopnea, no palpitations and no shortness of breath    Risk factors: prior DVT/PE    Risk factors: no aortic disease, no birth control, no high cholesterol, no hypertension, not obese and not pregnant        Review of Systems   Constitutional: Negative.  Negative for fatigue.   HENT: Negative.  Negative for trouble swallowing.    Eyes: Negative.    Respiratory: Negative for shortness of breath.    Cardiovascular: Positive for chest pain. Negative for palpitations and orthopnea.   Gastrointestinal: Negative.  Negative for abdominal pain.   Endocrine: Negative.    Genitourinary: Negative.    Musculoskeletal: Negative.    Skin: Negative.    Allergic/Immunologic: Negative.    Neurological: Negative.  Negative for dizziness and numbness.   Hematological: Negative.    Psychiatric/Behavioral: Negative.    All other systems reviewed and are negative.      Past Medical History:   Diagnosis Date   • Depression    • Diabetes mellitus (CMS/HCC)    • DVT (deep venous thrombosis) (CMS/[facility])    • GERD (gastroesophageal reflux disease)    • Gout    • Hyperlipidemia    • Hypertension    • Hypothyroid    • Kidney stone    • Migraine    • Neuropathy    • PE (pulmonary embolism)        Allergies   Allergen Reactions   • Amoxicillin Hives   • Penicillins Hives   • Toradol [Ketorolac Tromethamine] Hives   • Tramadol  Swelling       Past Surgical History:   Procedure Laterality Date   • CARDIAC SURGERY      Heart Cath done in    •  SECTION     • CHOLECYSTECTOMY     • COLONOSCOPY         Family History   Problem Relation Age of Onset   • No Known Problems Mother    • No Known Problems Father    • No Known Problems Sister    • No Known Problems Brother    • No Known Problems Son    • No Known Problems Daughter    • No Known Problems Maternal Grandmother    • No Known Problems Maternal Grandfather    • No Known Problems Paternal Grandmother    • No Known Problems Paternal Grandfather    • No Known Problems Cousin    • Rheum arthritis Neg Hx    • Osteoarthritis Neg Hx    • Asthma Neg Hx    • Diabetes Neg Hx    • Heart failure Neg Hx    • Hyperlipidemia Neg Hx    • Hypertension Neg Hx    • Migraines Neg Hx    • Rashes / Skin problems Neg Hx    • Seizures Neg Hx    • Stroke Neg Hx    • Thyroid disease Neg Hx        Social History     Socioeconomic History   • Marital status:      Spouse name: Not on file   • Number of children: Not on file   • Years of education: Not on file   • Highest education level: Not on file   Tobacco Use   • Smoking status: Never Smoker   • Smokeless tobacco: Never Used   Substance and Sexual Activity   • Alcohol use: No   • Drug use: No   • Sexual activity: Defer           Objective   Physical Exam   Constitutional: She is oriented to person, place, and time. She appears well-developed and well-nourished. No distress.   HENT:   Head: Normocephalic and atraumatic.   Mouth/Throat: No oropharyngeal exudate.   Eyes: Pupils are equal, round, and reactive to light. Right eye exhibits no discharge. Left eye exhibits no discharge.   Neck: Normal range of motion. Neck supple. No JVD present. No tracheal deviation present. No thyromegaly present.   Cardiovascular: Normal rate, regular rhythm, normal heart sounds and intact distal pulses. Exam reveals no gallop and no friction rub.   No murmur  heard.  Pulmonary/Chest: Effort normal and breath sounds normal. No stridor. No respiratory distress. She has no wheezes.   Abdominal: Soft. Bowel sounds are normal. She exhibits no distension. There is no tenderness. There is no guarding.   Musculoskeletal: Normal range of motion. She exhibits no edema or deformity.   Neurological: She is alert and oriented to person, place, and time. She displays normal reflexes. No cranial nerve deficit. She exhibits normal muscle tone.   Skin: Skin is warm and dry. Capillary refill takes less than 2 seconds. She is not diaphoretic. No erythema.   Psychiatric: She has a normal mood and affect. Her behavior is normal. Judgment and thought content normal.   Nursing note and vitals reviewed.      Procedures           ED Course                  MDM  Number of Diagnoses or Management Options  Chest pain, unspecified type: established and worsening  Diagnosis management comments: No longer in CP. Requesting opioids, I said no and she asked to leave.        Amount and/or Complexity of Data Reviewed  Clinical lab tests: ordered and reviewed  Tests in the radiology section of CPT®: ordered and reviewed  Tests in the medicine section of CPT®: reviewed and ordered  Decide to obtain previous medical records or to obtain history from someone other than the patient: yes  Review and summarize past medical records: yes  Independent visualization of images, tracings, or specimens: yes    Risk of Complications, Morbidity, and/or Mortality  Presenting problems: moderate  Diagnostic procedures: moderate  Management options: moderate    Patient Progress  Patient progress: stable        Final diagnoses:   Chest pain, unspecified type            Danial Higgins Jr., MD  02/15/19 5450       Danial Higgins Jr., MD  02/17/19 0259

## 2019-03-12 ENCOUNTER — APPOINTMENT (OUTPATIENT)
Dept: CT IMAGING | Facility: HOSPITAL | Age: 34
End: 2019-03-12

## 2019-03-12 ENCOUNTER — HOSPITAL ENCOUNTER (EMERGENCY)
Facility: HOSPITAL | Age: 34
Discharge: HOME | End: 2019-03-12
Attending: EMERGENCY MEDICINE | Admitting: EMERGENCY MEDICINE

## 2019-03-12 VITALS
HEART RATE: 102 BPM | HEIGHT: 64 IN | RESPIRATION RATE: 20 BRPM | WEIGHT: 293 LBS | TEMPERATURE: 98.3 F | BODY MASS INDEX: 50.02 KG/M2 | OXYGEN SATURATION: 99 % | SYSTOLIC BLOOD PRESSURE: 169 MMHG | DIASTOLIC BLOOD PRESSURE: 98 MMHG

## 2019-03-12 DIAGNOSIS — R79.1 SUBTHERAPEUTIC INTERNATIONAL NORMALIZED RATIO (INR): ICD-10-CM

## 2019-03-12 DIAGNOSIS — M54.6 CHRONIC RIGHT-SIDED THORACIC BACK PAIN: Primary | ICD-10-CM

## 2019-03-12 DIAGNOSIS — S09.90XA INJURY OF HEAD, INITIAL ENCOUNTER: ICD-10-CM

## 2019-03-12 DIAGNOSIS — G89.29 CHRONIC RIGHT-SIDED THORACIC BACK PAIN: Primary | ICD-10-CM

## 2019-03-12 LAB
ALBUMIN SERPL-MCNC: 4.41 G/DL (ref 3.5–5.2)
ALBUMIN/GLOB SERPL: 1.1 G/DL
ALP SERPL-CCNC: 88 U/L (ref 39–117)
ALT SERPL W P-5'-P-CCNC: 38 U/L (ref 1–33)
ANION GAP SERPL CALCULATED.3IONS-SCNC: 15.5 MMOL/L
AST SERPL-CCNC: 55 U/L (ref 1–32)
B-HCG UR QL: NEGATIVE
BASOPHILS # BLD AUTO: 0.03 10*3/MM3 (ref 0–0.2)
BASOPHILS NFR BLD AUTO: 0.5 % (ref 0–1.5)
BILIRUB SERPL-MCNC: 0.5 MG/DL (ref 0.1–1.2)
BILIRUB UR QL STRIP: NEGATIVE
BUN BLD-MCNC: 9 MG/DL (ref 6–20)
BUN/CREAT SERPL: 16.1 (ref 7–25)
CALCIUM SPEC-SCNC: 9.3 MG/DL (ref 8.6–10.5)
CHLORIDE SERPL-SCNC: 105 MMOL/L (ref 98–107)
CLARITY UR: ABNORMAL
CO2 SERPL-SCNC: 18.5 MMOL/L (ref 22–29)
COLOR UR: ABNORMAL
CREAT BLD-MCNC: 0.56 MG/DL (ref 0.57–1)
DEPRECATED RDW RBC AUTO: 48.2 FL (ref 37–54)
EOSINOPHIL # BLD AUTO: 0.06 10*3/MM3 (ref 0–0.4)
EOSINOPHIL NFR BLD AUTO: 1.1 % (ref 0.3–6.2)
ERYTHROCYTE [DISTWIDTH] IN BLOOD BY AUTOMATED COUNT: 16 % (ref 12.3–15.4)
GFR SERPL CREATININE-BSD FRML MDRD: 125 ML/MIN/1.73
GLOBULIN UR ELPH-MCNC: 3.9 GM/DL
GLUCOSE BLD-MCNC: 158 MG/DL (ref 65–99)
GLUCOSE UR STRIP-MCNC: NEGATIVE MG/DL
HCT VFR BLD AUTO: 35.9 % (ref 34–46.6)
HGB BLD-MCNC: 11.6 G/DL (ref 12–15.9)
HGB UR QL STRIP.AUTO: NEGATIVE
IMM GRANULOCYTES # BLD AUTO: 0.01 10*3/MM3 (ref 0–0.05)
IMM GRANULOCYTES NFR BLD AUTO: 0.2 % (ref 0–0.5)
INR PPP: 1.03 (ref 0.9–1.1)
KETONES UR QL STRIP: NEGATIVE
LEUKOCYTE ESTERASE UR QL STRIP.AUTO: NEGATIVE
LYMPHOCYTES # BLD AUTO: 1.73 10*3/MM3 (ref 0.7–3.1)
LYMPHOCYTES NFR BLD AUTO: 30.9 % (ref 19.6–45.3)
MCH RBC QN AUTO: 27 PG (ref 26.6–33)
MCHC RBC AUTO-ENTMCNC: 32.3 G/DL (ref 31.5–35.7)
MCV RBC AUTO: 83.7 FL (ref 79–97)
MONOCYTES # BLD AUTO: 0.5 10*3/MM3 (ref 0.1–0.9)
MONOCYTES NFR BLD AUTO: 8.9 % (ref 5–12)
NEUTROPHILS # BLD AUTO: 3.26 10*3/MM3 (ref 1.4–7)
NEUTROPHILS NFR BLD AUTO: 58.4 % (ref 42.7–76)
NITRITE UR QL STRIP: NEGATIVE
PH UR STRIP.AUTO: <=5 [PH] (ref 5–8)
PLATELET # BLD AUTO: 282 10*3/MM3 (ref 140–450)
PMV BLD AUTO: 8.8 FL (ref 6–12)
POTASSIUM BLD-SCNC: 3.9 MMOL/L (ref 3.5–5.2)
PROT SERPL-MCNC: 8.3 G/DL (ref 6–8.5)
PROT UR QL STRIP: NEGATIVE
PROTHROMBIN TIME: 13.7 SECONDS (ref 11–15.4)
RBC # BLD AUTO: 4.29 10*6/MM3 (ref 3.77–5.28)
SODIUM BLD-SCNC: 139 MMOL/L (ref 136–145)
SP GR UR STRIP: 1.03 (ref 1–1.03)
UROBILINOGEN UR QL STRIP: ABNORMAL
WBC NRBC COR # BLD: 5.59 10*3/MM3 (ref 3.4–10.8)

## 2019-03-12 PROCEDURE — 25010000002 ONDANSETRON PER 1 MG: Performed by: EMERGENCY MEDICINE

## 2019-03-12 PROCEDURE — 25010000002 MORPHINE PER 10 MG: Performed by: EMERGENCY MEDICINE

## 2019-03-12 PROCEDURE — 85610 PROTHROMBIN TIME: CPT | Performed by: PHYSICIAN ASSISTANT

## 2019-03-12 PROCEDURE — 81025 URINE PREGNANCY TEST: CPT | Performed by: PHYSICIAN ASSISTANT

## 2019-03-12 PROCEDURE — 96375 TX/PRO/DX INJ NEW DRUG ADDON: CPT

## 2019-03-12 PROCEDURE — 81003 URINALYSIS AUTO W/O SCOPE: CPT | Performed by: PHYSICIAN ASSISTANT

## 2019-03-12 PROCEDURE — 80053 COMPREHEN METABOLIC PANEL: CPT | Performed by: PHYSICIAN ASSISTANT

## 2019-03-12 PROCEDURE — 70450 CT HEAD/BRAIN W/O DYE: CPT

## 2019-03-12 PROCEDURE — 99283 EMERGENCY DEPT VISIT LOW MDM: CPT

## 2019-03-12 PROCEDURE — 70450 CT HEAD/BRAIN W/O DYE: CPT | Performed by: RADIOLOGY

## 2019-03-12 PROCEDURE — 96374 THER/PROPH/DIAG INJ IV PUSH: CPT

## 2019-03-12 PROCEDURE — 85025 COMPLETE CBC W/AUTO DIFF WBC: CPT | Performed by: PHYSICIAN ASSISTANT

## 2019-03-12 RX ORDER — SODIUM CHLORIDE 0.9 % (FLUSH) 0.9 %
10 SYRINGE (ML) INJECTION AS NEEDED
Status: DISCONTINUED | OUTPATIENT
Start: 2019-03-12 | End: 2019-03-12 | Stop reason: HOSPADM

## 2019-03-12 RX ORDER — ONDANSETRON 2 MG/ML
4 INJECTION INTRAMUSCULAR; INTRAVENOUS ONCE
Status: COMPLETED | OUTPATIENT
Start: 2019-03-12 | End: 2019-03-12

## 2019-03-12 RX ORDER — MORPHINE SULFATE 10 MG/ML
2 INJECTION INTRAMUSCULAR; INTRAVENOUS; SUBCUTANEOUS ONCE
Status: COMPLETED | OUTPATIENT
Start: 2019-03-12 | End: 2019-03-12

## 2019-03-12 RX ADMIN — ONDANSETRON 4 MG: 2 INJECTION, SOLUTION INTRAMUSCULAR; INTRAVENOUS at 12:01

## 2019-03-12 RX ADMIN — MORPHINE SULFATE 2 MG: 10 INJECTION, SOLUTION INTRAMUSCULAR; INTRAVENOUS at 12:01

## 2019-03-12 RX ADMIN — SODIUM CHLORIDE 500 ML: 9 INJECTION, SOLUTION INTRAVENOUS at 11:37

## 2019-03-12 NOTE — ED PROVIDER NOTES
Subjective   33-year-old female presents to the ER with complaint of right flank pain that got worse 2 days ago, head injury that occurred around 2 AM this morning.  Patient states she feels like she has a kidney stone.  She states she got up to use the restroom this morning, and fell out of her bed and hit the right occipital part of her head on her dresser.  She denies any loss of consciousness.  Patient is on anticoagulants due to a prior PE.  Patient states her urine has been darker, feels like there is blood in it.  She denies any abdominal pain, nausea, vomiting, fever, chills.        History provided by:  Patient   used: No    Fall   Mechanism of injury: fall    Injury location:  Head/neck  Head/neck injury location:  Head  Incident location:  Home  Time since incident:  8 hours  Arrived directly from scene: yes    Fall:     Fall occurred:  From a bed    Impact surface:  Furniture    Point of impact:  Head  Tetanus status:  Up to date  Associated symptoms: back pain    Associated symptoms: no abdominal pain, no chest pain, no headaches, no nausea, no neck pain and no vomiting    Risk factors: anticoagulation therapy        Review of Systems   Constitutional: Negative for activity change and fever.   HENT: Negative for congestion, rhinorrhea and sore throat.    Eyes: Negative for pain and redness.   Respiratory: Negative for cough, shortness of breath and wheezing.    Cardiovascular: Negative for chest pain.   Gastrointestinal: Negative for abdominal distention, abdominal pain, diarrhea, nausea and vomiting.   Endocrine: Negative for polyphagia and polyuria.   Genitourinary: Negative for decreased urine volume, difficulty urinating and dysuria.   Musculoskeletal: Positive for back pain. Negative for arthralgias, myalgias and neck pain.   Skin: Negative for rash and wound.   Allergic/Immunologic: Negative for food allergies and immunocompromised state.   Neurological: Negative for dizziness  and headaches.   Hematological: Negative for adenopathy. Does not bruise/bleed easily.   Psychiatric/Behavioral: Negative for agitation and confusion.   All other systems reviewed and are negative.      Past Medical History:   Diagnosis Date   • Depression    • Diabetes mellitus (CMS/HCC)    • DVT (deep venous thrombosis) (CMS/HCC)    • GERD (gastroesophageal reflux disease)    • Gout    • Hyperlipidemia    • Hypertension    • Hypothyroid    • Kidney stone    • Migraine    • Neuropathy    • PE (pulmonary embolism)        Allergies   Allergen Reactions   • Amoxicillin Hives   • Penicillins Hives   • Toradol [Ketorolac Tromethamine] Hives   • Tramadol Swelling       Past Surgical History:   Procedure Laterality Date   • CARDIAC SURGERY      Heart Cath done in    •  SECTION     • CHOLECYSTECTOMY     • COLONOSCOPY         Family History   Problem Relation Age of Onset   • No Known Problems Mother    • No Known Problems Father    • No Known Problems Sister    • No Known Problems Brother    • No Known Problems Son    • No Known Problems Daughter    • No Known Problems Maternal Grandmother    • No Known Problems Maternal Grandfather    • No Known Problems Paternal Grandmother    • No Known Problems Paternal Grandfather    • No Known Problems Cousin    • Rheum arthritis Neg Hx    • Osteoarthritis Neg Hx    • Asthma Neg Hx    • Diabetes Neg Hx    • Heart failure Neg Hx    • Hyperlipidemia Neg Hx    • Hypertension Neg Hx    • Migraines Neg Hx    • Rashes / Skin problems Neg Hx    • Seizures Neg Hx    • Stroke Neg Hx    • Thyroid disease Neg Hx        Social History     Socioeconomic History   • Marital status:      Spouse name: Not on file   • Number of children: Not on file   • Years of education: Not on file   • Highest education level: Not on file   Tobacco Use   • Smoking status: Never Smoker   • Smokeless tobacco: Never Used   Substance and Sexual Activity   • Alcohol use: No   • Drug use: No   •  Sexual activity: Defer           Objective   Physical Exam   Constitutional: She is oriented to person, place, and time. She appears well-developed and well-nourished.   HENT:   Head: Normocephalic and atraumatic. Head is without abrasion and without contusion.   Eyes: EOM are normal. Pupils are equal, round, and reactive to light.   Neck: Normal range of motion. Neck supple.   Cardiovascular: Normal rate, regular rhythm and normal heart sounds.   Pulmonary/Chest: Effort normal and breath sounds normal.   Abdominal: Soft. Bowel sounds are normal.   Musculoskeletal: Normal range of motion.        Back:    Neurological: She is alert and oriented to person, place, and time. GCS eye subscore is 4. GCS verbal subscore is 5. GCS motor subscore is 6.   Skin: Skin is warm and dry.   Psychiatric: She has a normal mood and affect. Her behavior is normal. Judgment and thought content normal.   Nursing note and vitals reviewed.      Procedures           ED Course  ED Course as of Mar 12 1510   Tue Mar 12, 2019   1200 Patient counseled that she is subtherapeutic on her INR.  She states when she is discharged from here, she will go down to her Coumadin clinic, and get dosage adjustments as needed.  [DAVID]   1340 Catheter urine is negative for any findings.  Workup is otherwise unremarkable, aside from her INR.  Will be discharged to follow-up with her primary care doctor as needed, and she will go to the Coumadin clinic after she leaves here.  [DAVID]      ED Course User Index  [DAVID] Porter Santos PA                  MDM  Number of Diagnoses or Management Options  Chronic right-sided thoracic back pain:   Injury of head, initial encounter:   Subtherapeutic international normalized ratio (INR):      Amount and/or Complexity of Data Reviewed  Clinical lab tests: ordered and reviewed  Tests in the radiology section of CPT®: reviewed and ordered  Tests in the medicine section of CPT®: reviewed and ordered  Decide to obtain previous  medical records or to obtain history from someone other than the patient: yes  Independent visualization of images, tracings, or specimens: yes    Patient Progress  Patient progress: stable        Final diagnoses:   Chronic right-sided thoracic back pain   Injury of head, initial encounter   Subtherapeutic international normalized ratio (INR)            Porter Santos PA  03/12/19 8918

## 2019-04-01 NOTE — ED NOTES
RAD STAFF AT BEDSIDE FOR PORTABLE CXR.     Yoselin Tomas RN  02/11/17 8916     Complex Repair And Transposition Flap Text: The defect edges were debeveled with a #15 scalpel blade.  The primary defect was closed partially with a complex linear closure.  Given the location of the remaining defect, shape of the defect and the proximity to free margins a transposition flap was deemed most appropriate for complete closure of the defect.  Using a sterile surgical marker, an appropriate advancement flap was drawn incorporating the defect and placing the expected incisions within the relaxed skin tension lines where possible.    The area thus outlined was incised deep to adipose tissue with a #15 scalpel blade.  The skin margins were undermined to an appropriate distance in all directions utilizing iris scissors.

## 2019-04-15 ENCOUNTER — APPOINTMENT (OUTPATIENT)
Dept: CT IMAGING | Facility: HOSPITAL | Age: 34
End: 2019-04-15

## 2019-04-15 ENCOUNTER — HOSPITAL ENCOUNTER (OUTPATIENT)
Facility: HOSPITAL | Age: 34
Discharge: LEFT BEFORE DISCHARGE | End: 2019-04-16
Attending: FAMILY MEDICINE | Admitting: FAMILY MEDICINE

## 2019-04-15 ENCOUNTER — APPOINTMENT (OUTPATIENT)
Dept: GENERAL RADIOLOGY | Facility: HOSPITAL | Age: 34
End: 2019-04-15

## 2019-04-15 VITALS
TEMPERATURE: 98.4 F | HEART RATE: 96 BPM | RESPIRATION RATE: 20 BRPM | WEIGHT: 293 LBS | HEIGHT: 64 IN | BODY MASS INDEX: 50.02 KG/M2 | SYSTOLIC BLOOD PRESSURE: 151 MMHG | DIASTOLIC BLOOD PRESSURE: 90 MMHG | OXYGEN SATURATION: 96 %

## 2019-04-15 DIAGNOSIS — R07.9 CHEST PAIN, UNSPECIFIED TYPE: Primary | ICD-10-CM

## 2019-04-15 LAB
ALBUMIN SERPL-MCNC: 4.3 G/DL (ref 3.5–5.2)
ALBUMIN/GLOB SERPL: 1.1 G/DL
ALP SERPL-CCNC: 80 U/L (ref 39–117)
ALT SERPL W P-5'-P-CCNC: 33 U/L (ref 1–33)
AMYLASE SERPL-CCNC: 54 U/L (ref 28–100)
ANION GAP SERPL CALCULATED.3IONS-SCNC: 19.9 MMOL/L
APTT PPP: 29.6 SECONDS (ref 23.8–36.1)
AST SERPL-CCNC: 37 U/L (ref 1–32)
BASOPHILS # BLD AUTO: 0.03 10*3/MM3 (ref 0–0.2)
BASOPHILS NFR BLD AUTO: 0.6 % (ref 0–1.5)
BILIRUB SERPL-MCNC: 0.2 MG/DL (ref 0.2–1.2)
BILIRUB UR QL STRIP: NEGATIVE
BUN BLD-MCNC: 12 MG/DL (ref 6–20)
BUN/CREAT SERPL: 20.7 (ref 7–25)
CALCIUM SPEC-SCNC: 9.4 MG/DL (ref 8.6–10.5)
CHLORIDE SERPL-SCNC: 98 MMOL/L (ref 98–107)
CK MB SERPL-CCNC: <1 NG/ML
CK SERPL-CCNC: 41 U/L (ref 20–180)
CLARITY UR: CLEAR
CO2 SERPL-SCNC: 19.1 MMOL/L (ref 22–29)
COLOR UR: YELLOW
CREAT BLD-MCNC: 0.58 MG/DL (ref 0.57–1)
CRP SERPL-MCNC: 1.62 MG/DL (ref 0–0.5)
D-LACTATE SERPL-SCNC: 3.1 MMOL/L (ref 0.5–2)
D-LACTATE SERPL-SCNC: 3.9 MMOL/L (ref 0.5–2)
DEPRECATED RDW RBC AUTO: 46 FL (ref 37–54)
EOSINOPHIL # BLD AUTO: 0.17 10*3/MM3 (ref 0–0.4)
EOSINOPHIL NFR BLD AUTO: 3.3 % (ref 0.3–6.2)
ERYTHROCYTE [DISTWIDTH] IN BLOOD BY AUTOMATED COUNT: 15.4 % (ref 12.3–15.4)
FLUAV AG NPH QL: NEGATIVE
FLUBV AG NPH QL IA: NEGATIVE
GFR SERPL CREATININE-BSD FRML MDRD: 120 ML/MIN/1.73
GLOBULIN UR ELPH-MCNC: 3.8 GM/DL
GLUCOSE BLD-MCNC: 260 MG/DL (ref 65–99)
GLUCOSE BLDC GLUCOMTR-MCNC: 258 MG/DL (ref 70–130)
GLUCOSE UR STRIP-MCNC: NEGATIVE MG/DL
HCG SERPL QL: NEGATIVE
HCT VFR BLD AUTO: 35.6 % (ref 34–46.6)
HGB BLD-MCNC: 11.5 G/DL (ref 12–15.9)
HGB UR QL STRIP.AUTO: NEGATIVE
HOLD SPECIMEN: NORMAL
IMM GRANULOCYTES # BLD AUTO: 0.01 10*3/MM3 (ref 0–0.05)
IMM GRANULOCYTES NFR BLD AUTO: 0.2 % (ref 0–0.5)
INR PPP: 1.45 (ref 0.9–1.1)
KETONES UR QL STRIP: NEGATIVE
LEUKOCYTE ESTERASE UR QL STRIP.AUTO: NEGATIVE
LIPASE SERPL-CCNC: 40 U/L (ref 13–60)
LYMPHOCYTES # BLD AUTO: 1.78 10*3/MM3 (ref 0.7–3.1)
LYMPHOCYTES NFR BLD AUTO: 34 % (ref 19.6–45.3)
MAGNESIUM SERPL-MCNC: 1.6 MG/DL (ref 1.6–2.6)
MCH RBC QN AUTO: 26.9 PG (ref 26.6–33)
MCHC RBC AUTO-ENTMCNC: 32.3 G/DL (ref 31.5–35.7)
MCV RBC AUTO: 83.2 FL (ref 79–97)
MONOCYTES # BLD AUTO: 0.49 10*3/MM3 (ref 0.1–0.9)
MONOCYTES NFR BLD AUTO: 9.4 % (ref 5–12)
NEUTROPHILS # BLD AUTO: 2.75 10*3/MM3 (ref 1.4–7)
NEUTROPHILS NFR BLD AUTO: 52.5 % (ref 42.7–76)
NITRITE UR QL STRIP: NEGATIVE
NT-PROBNP SERPL-MCNC: <5 PG/ML (ref 5–450)
PH UR STRIP.AUTO: <=5 [PH] (ref 5–8)
PLATELET # BLD AUTO: 270 10*3/MM3 (ref 140–450)
PMV BLD AUTO: 9.7 FL (ref 6–12)
POTASSIUM BLD-SCNC: 3.9 MMOL/L (ref 3.5–5.2)
PROT SERPL-MCNC: 8.1 G/DL (ref 6–8.5)
PROT UR QL STRIP: NEGATIVE
PROTHROMBIN TIME: 17.8 SECONDS (ref 11–15.4)
RBC # BLD AUTO: 4.28 10*6/MM3 (ref 3.77–5.28)
SODIUM BLD-SCNC: 137 MMOL/L (ref 136–145)
SP GR UR STRIP: >1.03 (ref 1–1.03)
TROPONIN T SERPL-MCNC: <0.01 NG/ML (ref 0–0.03)
TROPONIN T SERPL-MCNC: <0.01 NG/ML (ref 0–0.03)
TSH SERPL DL<=0.05 MIU/L-ACNC: 7.01 MIU/ML (ref 0.27–4.2)
UROBILINOGEN UR QL STRIP: ABNORMAL
WBC NRBC COR # BLD: 5.23 10*3/MM3 (ref 3.4–10.8)

## 2019-04-15 PROCEDURE — 83690 ASSAY OF LIPASE: CPT | Performed by: PHYSICIAN ASSISTANT

## 2019-04-15 PROCEDURE — 71275 CT ANGIOGRAPHY CHEST: CPT | Performed by: RADIOLOGY

## 2019-04-15 PROCEDURE — 86140 C-REACTIVE PROTEIN: CPT | Performed by: PHYSICIAN ASSISTANT

## 2019-04-15 PROCEDURE — 84703 CHORIONIC GONADOTROPIN ASSAY: CPT | Performed by: PHYSICIAN ASSISTANT

## 2019-04-15 PROCEDURE — 71045 X-RAY EXAM CHEST 1 VIEW: CPT

## 2019-04-15 PROCEDURE — 82550 ASSAY OF CK (CPK): CPT | Performed by: PHYSICIAN ASSISTANT

## 2019-04-15 PROCEDURE — 85025 COMPLETE CBC W/AUTO DIFF WBC: CPT | Performed by: PHYSICIAN ASSISTANT

## 2019-04-15 PROCEDURE — 93010 ELECTROCARDIOGRAM REPORT: CPT | Performed by: INTERNAL MEDICINE

## 2019-04-15 PROCEDURE — 96376 TX/PRO/DX INJ SAME DRUG ADON: CPT

## 2019-04-15 PROCEDURE — 85610 PROTHROMBIN TIME: CPT | Performed by: PHYSICIAN ASSISTANT

## 2019-04-15 PROCEDURE — G0378 HOSPITAL OBSERVATION PER HR: HCPCS

## 2019-04-15 PROCEDURE — 99285 EMERGENCY DEPT VISIT HI MDM: CPT

## 2019-04-15 PROCEDURE — 83735 ASSAY OF MAGNESIUM: CPT | Performed by: PHYSICIAN ASSISTANT

## 2019-04-15 PROCEDURE — 96374 THER/PROPH/DIAG INJ IV PUSH: CPT

## 2019-04-15 PROCEDURE — 82150 ASSAY OF AMYLASE: CPT | Performed by: PHYSICIAN ASSISTANT

## 2019-04-15 PROCEDURE — 93005 ELECTROCARDIOGRAM TRACING: CPT | Performed by: FAMILY MEDICINE

## 2019-04-15 PROCEDURE — 87040 BLOOD CULTURE FOR BACTERIA: CPT | Performed by: PHYSICIAN ASSISTANT

## 2019-04-15 PROCEDURE — 82553 CREATINE MB FRACTION: CPT | Performed by: PHYSICIAN ASSISTANT

## 2019-04-15 PROCEDURE — 87804 INFLUENZA ASSAY W/OPTIC: CPT | Performed by: PHYSICIAN ASSISTANT

## 2019-04-15 PROCEDURE — 84443 ASSAY THYROID STIM HORMONE: CPT | Performed by: PHYSICIAN ASSISTANT

## 2019-04-15 PROCEDURE — 80053 COMPREHEN METABOLIC PANEL: CPT | Performed by: PHYSICIAN ASSISTANT

## 2019-04-15 PROCEDURE — 36415 COLL VENOUS BLD VENIPUNCTURE: CPT

## 2019-04-15 PROCEDURE — 25010000002 MORPHINE PER 10 MG: Performed by: FAMILY MEDICINE

## 2019-04-15 PROCEDURE — 81003 URINALYSIS AUTO W/O SCOPE: CPT | Performed by: PHYSICIAN ASSISTANT

## 2019-04-15 PROCEDURE — 71275 CT ANGIOGRAPHY CHEST: CPT

## 2019-04-15 PROCEDURE — 84484 ASSAY OF TROPONIN QUANT: CPT | Performed by: PHYSICIAN ASSISTANT

## 2019-04-15 PROCEDURE — 0 IOPAMIDOL PER 1 ML: Performed by: FAMILY MEDICINE

## 2019-04-15 PROCEDURE — 96375 TX/PRO/DX INJ NEW DRUG ADDON: CPT

## 2019-04-15 PROCEDURE — 85730 THROMBOPLASTIN TIME PARTIAL: CPT | Performed by: PHYSICIAN ASSISTANT

## 2019-04-15 PROCEDURE — 83605 ASSAY OF LACTIC ACID: CPT | Performed by: PHYSICIAN ASSISTANT

## 2019-04-15 PROCEDURE — 82962 GLUCOSE BLOOD TEST: CPT

## 2019-04-15 PROCEDURE — 25010000002 ONDANSETRON PER 1 MG: Performed by: PHYSICIAN ASSISTANT

## 2019-04-15 PROCEDURE — 71045 X-RAY EXAM CHEST 1 VIEW: CPT | Performed by: RADIOLOGY

## 2019-04-15 PROCEDURE — 83880 ASSAY OF NATRIURETIC PEPTIDE: CPT | Performed by: PHYSICIAN ASSISTANT

## 2019-04-15 RX ORDER — MORPHINE SULFATE 2 MG/ML
2 INJECTION, SOLUTION INTRAMUSCULAR; INTRAVENOUS ONCE
Status: COMPLETED | OUTPATIENT
Start: 2019-04-15 | End: 2019-04-15

## 2019-04-15 RX ORDER — ONDANSETRON 2 MG/ML
4 INJECTION INTRAMUSCULAR; INTRAVENOUS ONCE
Status: COMPLETED | OUTPATIENT
Start: 2019-04-15 | End: 2019-04-15

## 2019-04-15 RX ORDER — ONDANSETRON 2 MG/ML
4 INJECTION INTRAMUSCULAR; INTRAVENOUS EVERY 6 HOURS PRN
Status: CANCELLED | OUTPATIENT
Start: 2019-04-15

## 2019-04-15 RX ORDER — SODIUM CHLORIDE 0.9 % (FLUSH) 0.9 %
10 SYRINGE (ML) INJECTION AS NEEDED
Status: DISCONTINUED | OUTPATIENT
Start: 2019-04-15 | End: 2019-04-16 | Stop reason: HOSPADM

## 2019-04-15 RX ORDER — SENNA AND DOCUSATE SODIUM 50; 8.6 MG/1; MG/1
2 TABLET, FILM COATED ORAL NIGHTLY PRN
Status: CANCELLED | OUTPATIENT
Start: 2019-04-15

## 2019-04-15 RX ORDER — SODIUM CHLORIDE 0.9 % (FLUSH) 0.9 %
3 SYRINGE (ML) INJECTION EVERY 12 HOURS SCHEDULED
Status: CANCELLED | OUTPATIENT
Start: 2019-04-15

## 2019-04-15 RX ORDER — ALUMINA, MAGNESIA, AND SIMETHICONE 2400; 2400; 240 MG/30ML; MG/30ML; MG/30ML
15 SUSPENSION ORAL EVERY 6 HOURS PRN
Status: CANCELLED | OUTPATIENT
Start: 2019-04-15

## 2019-04-15 RX ORDER — MAGNESIUM SULFATE HEPTAHYDRATE 40 MG/ML
2 INJECTION, SOLUTION INTRAVENOUS AS NEEDED
Status: CANCELLED | OUTPATIENT
Start: 2019-04-15

## 2019-04-15 RX ORDER — MAGNESIUM SULFATE HEPTAHYDRATE 40 MG/ML
4 INJECTION, SOLUTION INTRAVENOUS AS NEEDED
Status: CANCELLED | OUTPATIENT
Start: 2019-04-15

## 2019-04-15 RX ORDER — POTASSIUM CHLORIDE 7.45 MG/ML
10 INJECTION INTRAVENOUS
Status: CANCELLED | OUTPATIENT
Start: 2019-04-15

## 2019-04-15 RX ORDER — POTASSIUM CHLORIDE 750 MG/1
40 CAPSULE, EXTENDED RELEASE ORAL AS NEEDED
Status: CANCELLED | OUTPATIENT
Start: 2019-04-15

## 2019-04-15 RX ORDER — METOPROLOL TARTRATE 5 MG/5ML
5 INJECTION INTRAVENOUS ONCE
Status: COMPLETED | OUTPATIENT
Start: 2019-04-15 | End: 2019-04-15

## 2019-04-15 RX ORDER — POTASSIUM CHLORIDE 1.5 G/1.77G
40 POWDER, FOR SOLUTION ORAL AS NEEDED
Status: CANCELLED | OUTPATIENT
Start: 2019-04-15

## 2019-04-15 RX ORDER — ACETAMINOPHEN 325 MG/1
650 TABLET ORAL EVERY 4 HOURS PRN
Status: CANCELLED | OUTPATIENT
Start: 2019-04-15

## 2019-04-15 RX ORDER — ONDANSETRON 4 MG/1
4 TABLET, FILM COATED ORAL EVERY 6 HOURS PRN
Status: CANCELLED | OUTPATIENT
Start: 2019-04-15

## 2019-04-15 RX ORDER — NITROGLYCERIN 0.4 MG/1
0.4 TABLET SUBLINGUAL
Status: CANCELLED | OUTPATIENT
Start: 2019-04-15

## 2019-04-15 RX ORDER — SODIUM CHLORIDE 0.9 % (FLUSH) 0.9 %
3-10 SYRINGE (ML) INJECTION AS NEEDED
Status: CANCELLED | OUTPATIENT
Start: 2019-04-15

## 2019-04-15 RX ADMIN — SODIUM CHLORIDE 641 ML: 9 INJECTION, SOLUTION INTRAVENOUS at 21:15

## 2019-04-15 RX ADMIN — IOPAMIDOL 100 ML: 755 INJECTION, SOLUTION INTRAVENOUS at 20:13

## 2019-04-15 RX ADMIN — MORPHINE SULFATE 2 MG: 2 INJECTION, SOLUTION INTRAMUSCULAR; INTRAVENOUS at 21:14

## 2019-04-15 RX ADMIN — SODIUM CHLORIDE 1000 ML: 9 INJECTION, SOLUTION INTRAVENOUS at 19:28

## 2019-04-15 RX ADMIN — MORPHINE SULFATE 4 MG: 4 INJECTION INTRAVENOUS at 19:30

## 2019-04-15 RX ADMIN — METOPROLOL TARTRATE 5 MG: 5 INJECTION, SOLUTION INTRAVENOUS at 19:32

## 2019-04-15 RX ADMIN — ONDANSETRON 4 MG: 2 INJECTION, SOLUTION INTRAMUSCULAR; INTRAVENOUS at 19:29

## 2019-04-15 NOTE — ED NOTES
EKG completed by another tech at 1849, and was given to Dr. Narayanan.      Symes, Heather  04/15/19 1901

## 2019-04-15 NOTE — ED PROVIDER NOTES
Subjective   34-year-old female presents the ED today for chest pain and shortness of breath.  She states she has had cough and congestion for the last 2 or 3 days.  She states when she woke up this morning she had pain in the center of her chest that radiated straight through into her back.  She states it has been constant and it is worse when she moves.  She states she has also felt short of breath today.  She states throughout the day she has begun to feel generally weak and dizzy.  She states she felt so dizzy that she fell out of bed.  She went to urgent care this evening and they did an EKG due to her shortness of breath.  She states she was told her EKG was abnormal and her blood pressure was elevated so it was recommended that she come to the ER for an evaluation.        History provided by:  Patient  Chest Pain   Pain location:  Substernal area  Pain quality: aching    Pain radiates to:  Upper back  Pain severity:  Moderate  Onset quality:  Gradual  Duration: started when she woke up this morning.  Timing:  Constant  Progression:  Unchanged  Chronicity:  New  Context: at rest    Relieved by:  Nothing  Worsened by:  Movement  Associated symptoms: back pain, cough, dizziness, shortness of breath and weakness    Associated symptoms: no abdominal pain, no altered mental status, no anorexia, no anxiety, no claudication, no diaphoresis, no dysphagia, no fatigue, no fever, no headache, no heartburn, no lower extremity edema, no nausea, no near-syncope, no numbness, no orthopnea, no palpitations, no PND, no syncope and no vomiting    Risk factors: hypertension, obesity and prior DVT/PE        Review of Systems   Constitutional: Negative for diaphoresis, fatigue and fever.   HENT: Positive for congestion. Negative for trouble swallowing.    Eyes: Negative.    Respiratory: Positive for cough and shortness of breath.    Cardiovascular: Positive for chest pain. Negative for palpitations, orthopnea, claudication,  syncope, PND and near-syncope.   Gastrointestinal: Negative for abdominal pain, anorexia, heartburn, nausea and vomiting.   Genitourinary: Negative.    Musculoskeletal: Positive for back pain.   Skin: Negative.    Neurological: Positive for dizziness and weakness. Negative for numbness and headaches.   Psychiatric/Behavioral: Negative.    All other systems reviewed and are negative.      Past Medical History:   Diagnosis Date   • Depression    • Diabetes mellitus (CMS/HCC)    • DVT (deep venous thrombosis) (CMS/[facility])    • Factor 5 Leiden mutation, heterozygous (CMS/HCC)    • GERD (gastroesophageal reflux disease)    • Gout    • Hyperlipidemia    • Hypertension    • Hypothyroid    • Kidney stone    • Migraine    • Neuropathy    • PE (pulmonary embolism)        Allergies   Allergen Reactions   • Amoxicillin Hives   • Penicillins Hives   • Toradol [Ketorolac Tromethamine] Hives   • Tramadol Swelling       Past Surgical History:   Procedure Laterality Date   • CARDIAC SURGERY      Heart Cath done in    •  SECTION     • CHOLECYSTECTOMY     • COLONOSCOPY         Family History   Problem Relation Age of Onset   • No Known Problems Mother    • No Known Problems Father    • No Known Problems Sister    • No Known Problems Brother    • No Known Problems Son    • No Known Problems Daughter    • No Known Problems Maternal Grandmother    • No Known Problems Maternal Grandfather    • No Known Problems Paternal Grandmother    • No Known Problems Paternal Grandfather    • No Known Problems Cousin    • Rheum arthritis Neg Hx    • Osteoarthritis Neg Hx    • Asthma Neg Hx    • Diabetes Neg Hx    • Heart failure Neg Hx    • Hyperlipidemia Neg Hx    • Hypertension Neg Hx    • Migraines Neg Hx    • Rashes / Skin problems Neg Hx    • Seizures Neg Hx    • Stroke Neg Hx    • Thyroid disease Neg Hx        Social History     Socioeconomic History   • Marital status:      Spouse name: Not on file   • Number of children: Not  on file   • Years of education: Not on file   • Highest education level: Not on file   Tobacco Use   • Smoking status: Never Smoker   • Smokeless tobacco: Never Used   Substance and Sexual Activity   • Alcohol use: No   • Drug use: No   • Sexual activity: Defer           Objective   Physical Exam   Constitutional: She is oriented to person, place, and time. She appears well-developed and well-nourished. No distress.   HENT:   Head: Normocephalic and atraumatic.   Eyes: EOM are normal. Pupils are equal, round, and reactive to light.   Neck: Normal range of motion. Neck supple. No JVD present. No tracheal deviation present.   Cardiovascular: Regular rhythm, intact distal pulses and normal pulses. Tachycardia present.   Pulmonary/Chest: Effort normal and breath sounds normal.   Abdominal: Soft. Bowel sounds are normal.   Morbidly obese   Musculoskeletal: Normal range of motion.        Right lower leg: Normal.        Left lower leg: Normal.   Neurological: She is alert and oriented to person, place, and time.   Skin: Skin is warm and dry. Capillary refill takes less than 2 seconds.   Psychiatric: She has a normal mood and affect. Her behavior is normal.   Nursing note and vitals reviewed.      Procedures           ED Course  ED Course as of Apr 15 2255   Mon Apr 15, 2019   1857 18:49 - sinus tachycardia, rate of 123, no acute ischemia, reviewed by Dr. Narayanan ECG 12 Lead [AH]   1956 Endorsed to Georgie THRASHER  [AH]   2028 Negative per Dr. Rivero  XR Chest 1 View [ML]   2210 NO PE. No aortic aneurysm or dissection. Mild cardiomegaly. No acute pulmonary disease per [facility]  CT Chest Pulmonary Embolism With Contrast [ML]   2211 I have discussed this case with Dr. Rivero - she recommends discharging this patient home.  This patient lactic acidosis is related to her metformin use.   [ML]   2253 Patient states that she is still having chest pain.  I discussed this case with Debbi Cunningham she will except the patient to observation.   [ML]      ED Course User Index  [AH] Elisah Tomas, PA  [ML] Georgie Mckenzie PA                  [facility]      Final diagnoses:   Chest pain, unspecified type            Georgie Mckenzie PA  04/15/19 8741

## 2019-04-16 NOTE — H&P
"         HISTORY AND PHYSICAL        Patient Identification:  Name:  Natalia Arzate  Age:  34 y.o.  Sex:  female  :  1985  MRN:  [id number]   Visit Number:  [id number]  Primary Care Physician:  Leno Edmonds APRN       Subjective     Subjective     Chief complaint:     Chief Complaint   Patient presents with   • Chest Pain       History of presenting illness:     Patient is a 34-year-old female with a past medical history of factor V Leiden mutation, GERD, gout, hyperlipidemia, hypertension, hypothyroidism, kidney stones, migraine, neuropathy, DVT, diabetes mellitus, and depression.  She presents to the ED today with chief complaint of chest pain and shortness of breath.  She reports chest pain started this morning and has been continuous, worse on movement.  She reports pain is located substernal chest radiating into her back.  She describes pain as \"stabbing\".  Patient reports she has been short of breath and has had a dry cough for the last 2 days.  She reports generalized weakness and fatigue.  She states she was seen at [facility] earlier today and had an EKG performed, they advised her to go to the ED for further evaluation of symptoms.  Patient denies abdominal pain, nausea, vomiting, diarrhea, edema, dysuria, headache, syncopal episodes, dizziness, rectal bleeding, or hemoptysis.    Workup in the ED included: WBC 5.23.  TSH 7.010.  Magnesium 1.6.  CRP 1.62.  Lactate 3.9.  Troponin x2- negative. BNP less than 5.0.  PT/INR 17.8/1.45.  Creatinine 0.58 Sodium 137. Potassium 3.9.  Influenza negative.  Alysis negative.  Chest x-ray unremarkable.  CT chest PE protocol reveals no PE.  No aortic aneurysm or dissection.  No no acute pulmonary disease.  Mild cardiomegaly.  Per V rad.  EKG reveals sinus tachycardia with a rate of 123 bpm.  Otherwise normal EKG.  No acute changes noted.    Patient admitted to the observation unit for further evaluation and " monitoring.      ---------------------------------------------------------------------------------------------------------------------     Review Of Systems:    Constitutional: no fever, chills and night sweats. No appetite change or unexpected weight change.  Positive for fatigue.  Eyes: no eye drainage, itching or redness.  HEENT: no mouth sores, dysphagia or nose bleed.  Respiratory: Positive for shortness of breath.  Nonproductive cough.  Cardiovascular: Positive for chest pain-see HPI.  No palpitations or orthopnea.  Gastrointestinal: no nausea, vomiting or diarrhea. No abdominal pain, hematemesis or rectal bleeding.  Genitourinary: no dysuria or polyuria.  Hematologic/lymphatic: no lymph node abnormalities, no easy bruising or easy bleeding.  Musculoskeletal: Positive for myalgias.  Skin: No rash and no itching.  Neurological: no loss of consciousness, no seizure, no headache.  Behavioral/Psych: no depression or suicidal ideation.  Endocrine: no hot flashes.  Immunologic: negative.    ---------------------------------------------------------------------------------------------------------------------     Past Medical History    Past Medical History:   Diagnosis Date   • Depression    • Diabetes mellitus (CMS/HCC)    • DVT (deep venous thrombosis) (CMS/HCC)    • Factor 5 Leiden mutation, heterozygous (CMS/HCC)    • GERD (gastroesophageal reflux disease)    • Gout    • Hyperlipidemia    • Hypertension    • Hypothyroid    • Kidney stone    • Migraine    • Neuropathy    • PE (pulmonary embolism)        Past Surgical History    Past Surgical History:   Procedure Laterality Date   • CARDIAC SURGERY      Heart Cath done in    •  SECTION     • CHOLECYSTECTOMY     • COLONOSCOPY         Family History    Family History   Problem Relation Age of Onset   • No Known Problems Mother    • No Known Problems Father    • No Known Problems Sister    • No Known Problems Brother    • No Known Problems Son    • No Known  Problems Daughter    • No Known Problems Maternal Grandmother    • No Known Problems Maternal Grandfather    • No Known Problems Paternal Grandmother    • No Known Problems Paternal Grandfather    • No Known Problems Cousin    • Rheum arthritis Neg Hx    • Osteoarthritis Neg Hx    • Asthma Neg Hx    • Diabetes Neg Hx    • Heart failure Neg Hx    • Hyperlipidemia Neg Hx    • Hypertension Neg Hx    • Migraines Neg Hx    • Rashes / Skin problems Neg Hx    • Seizures Neg Hx    • Stroke Neg Hx    • Thyroid disease Neg Hx        Social History    Social History     Tobacco Use   • Smoking status: Never Smoker   • Smokeless tobacco: Never Used   Substance Use Topics   • Alcohol use: No   • Drug use: No       Allergies    Amoxicillin; Penicillins; Toradol [ketorolac tromethamine]; and Tramadol  ---------------------------------------------------------------------------------------------------------------------     Home Medications:    Prior to Admission Medications     Prescriptions Last Dose Informant Patient Reported? Taking?    albuterol (PROVENTIL HFA;VENTOLIN HFA) 108 (90 BASE) MCG/ACT inhaler   Yes No    Inhale 2 puffs every 4 (four) hours as needed for wheezing.    allopurinol (ZYLOPRIM) 300 MG tablet   Yes No    Take 300 mg by mouth daily.    aspirin 81 MG EC tablet   Yes No    Take 81 mg by mouth Daily.    azelastine (ASTELIN) 0.1 % nasal spray   Yes No    2 sprays into each nostril 2 (two) times a day. Use in each nostril as directed    buPROPion SR (WELLBUTRIN SR) 150 MG 12 hr tablet   Yes No    Take 150 mg by mouth 2 (two) times a day.    cetirizine (ZyrTEC) 10 MG tablet   Yes No    Take 10 mg by mouth daily.    cholecalciferol (VITAMIN D3) 1000 UNITS tablet   Yes No    Take 1,000 Units by mouth daily.    cyclobenzaprine (FLEXERIL) 10 MG tablet   No No    Take 1 tablet by mouth 3 (Three) Times a Day As Needed for muscle spasms.    diclofenac (VOLTAREN) 75 MG EC tablet   No No    Take 1 tablet by mouth 2 (Two)  Times a Day.    diflunisal (DOLOBID) 500 MG tablet tablet   No No    Take 1 tablet by mouth 2 (Two) Times a Day.    DULoxetine (CYMBALTA) 60 MG capsule   Yes No    Take 60 mg by mouth Daily.    FLUoxetine (PROzac) 20 MG capsule   Yes No    Take 40 mg by mouth Daily.    fluticasone (FLONASE) 50 MCG/ACT nasal spray   Yes No    2 sprays into each nostril daily. Administer 2 sprays in each nostril for each dose.    folic acid (FOLVITE) 1 MG tablet   Yes No    Take 1 mg by mouth daily.    gabapentin (NEURONTIN) 800 MG tablet   Yes No    Take 800 mg by mouth 4 (four) times a day.    glimepiride (AMARYL) 2 MG tablet  Self Yes No    Take 4 mg by mouth Every Morning Before Breakfast.    guaifenesin-dextromethorphan (MUCINEX DM)  MG tablet sustained-release 12 hour tablet   No No    Take 2 tablets by mouth Every 12 (Twelve) Hours.    hydrochlorothiazide (HYDRODIURIL) 25 MG tablet   Yes No    Take 25 mg by mouth daily.    hydrOXYzine (ATARAX) 25 MG tablet   Yes No    Take 25 mg by mouth 3 (three) times a day as needed for itching.    levothyroxine (SYNTHROID, LEVOTHROID) 25 MCG tablet   Yes No    Take 25 mcg by mouth daily.    meclizine (ANTIVERT) 12.5 MG tablet   Yes No    Take 12.5 mg by mouth 3 (three) times a day as needed for dizziness.    meloxicam (MOBIC) 15 MG tablet   No No    Take 1 tablet by mouth Daily.    metaxalone (SKELAXIN) 800 MG tablet   No No    Take 1 tablet by mouth 3 (Three) Times a Day As Needed for Muscle Spasms.    metFORMIN (GLUCOPHAGE) 500 MG tablet   Yes No    Take 500 mg by mouth Daily With Breakfast, Lunch & Dinner.    methocarbamol (ROBAXIN) 750 MG tablet   Yes No    Take 750 mg by mouth 3 (three) times a day.    metoprolol succinate XL (TOPROL-XL) 25 MG 24 hr tablet   Yes No    Take 25 mg by mouth daily.    miconazole (MICOTIN) 2 % vaginal cream   No No    Insert 1 applicator into the vagina Every Night.    mometasone-formoterol (DULERA 200) 200-5 MCG/ACT inhaler   Yes No    Inhale 2 puffs  2 (two) times a day.    montelukast (SINGULAIR) 10 MG tablet   Yes No    Take 10 mg by mouth every night.    Multiple Vitamins-Minerals (MULTIVITAMIN ADULTS PO)   Yes No    Take  by mouth.    Omega-3 Fatty Acids (FISH OIL) 1000 MG capsule capsule   Yes No    Take 1,000 mg by mouth 3 (three) times a day with meals.    omeprazole (priLOSEC) 20 MG capsule   Yes No    Take 20 mg by mouth Daily.    ondansetron (ZOFRAN) 4 MG tablet   No No    Take 1 tablet by mouth Every 6 (Six) Hours As Needed for Nausea or Vomiting.    oxyCODONE-acetaminophen (PERCOCET) 5-325 MG per tablet   No No    Take 1 tablet by mouth Every 4 (Four) Hours As Needed for Severe Pain .    phenazopyridine (PYRIDIUM) 200 MG tablet   No No    Take 1 tablet by mouth 3 (Three) Times a Day As Needed for bladder spasms.    ranitidine (ZANTAC) 150 MG tablet   Yes No    Take 150 mg by mouth 2 (two) times a day.    SUMAtriptan (IMITREX) 100 MG tablet   Yes No    Take 100 mg by mouth every 2 (two) hours as needed for migraine.    tamsulosin (FLOMAX) 0.4 MG capsule 24 hr capsule   No No    Take 1 capsule by mouth Daily.    tiZANidine (ZANAFLEX) 4 MG tablet   Yes No    Take 4 mg by mouth 2 (Two) Times a Day.    topiramate (TOPAMAX) 100 MG tablet   Yes No    Take 100 mg by mouth 2 (two) times a day.    traMADol (ULTRAM) 50 MG tablet   No No    Take 1 tablet by mouth Every 8 (Eight) Hours As Needed for Moderate Pain .    vitamin B-12 (CYANOCOBALAMIN) 500 MCG tablet   Yes No    Take 500 mcg by mouth daily.    warfarin (COUMADIN) 10 MG tablet   Yes No    Take 12 mg by mouth Daily.        ---------------------------------------------------------------------------------------------------------------------    Objective     Objective     Hospital Scheduled Meds:      No current facility-administered medications for this encounter.   ---------------------------------------------------------------------------------------------------------------------   Vital Signs:  Temp:   [98.4 °F (36.9 °C)-98.8 °F (37.1 °C)] 98.4 °F (36.9 °C)  Heart Rate:  [] 96  Resp:  [20] 20  BP: (113-163)/() 151/90  Mean Arterial Pressure (Non-Invasive) for the past 24 hrs (Last 3 readings):   Noninvasive MAP (mmHg)   04/15/19 2357 108   04/15/19 2321 110   04/15/19 2302 120     SpO2 Percentage    04/15/19 2302 04/15/19 2321 04/15/19 2357   SpO2: 95% 96% 96%     SpO2:  [95 %-99 %] 96 %  on   ;   Device (Oxygen Therapy): room air    Body mass index is 54.87 kg/m².  Wt Readings from Last 3 Encounters:   04/15/19 (!) 145 kg (319 lb 10.7 oz)   03/12/19 (!) 138 kg (305 lb)   02/05/19 (!) 143 kg (315 lb)     ---------------------------------------------------------------------------------------------------------------------     Physical Exam:    Constitutional:  Well-developed and well-nourished.  No respiratory distress.      HENT:  Head: Normocephalic and atraumatic.  Mouth:  Moist mucous membranes.    Eyes:  Conjunctivae and EOM are normal.  No scleral icterus.  Neck:  Neck supple.  No JVD present.    Cardiovascular:  Normal rate, regular rhythm and normal heart sounds with no murmur. No edema.  Pulmonary/Chest:  No respiratory distress, no wheezes, no crackles, with normal breath sounds and good air movement.  Abdominal:  Soft.  Bowel sounds are normal.  No distension and no tenderness.   Musculoskeletal:  No edema, no tenderness, and no deformity.  No swelling or redness of joints.  Neurological:  Alert and oriented to person, place, and time.  No facial droop.  No slurred speech.   Skin:  Skin is warm and dry.  No rash noted.  No pallor.   Psychiatric:  Normal mood and affect.  Behavior is normal.    ---------------------------------------------------------------------------------------------------------------------  I have personally reviewed the EKG/Telemetry strip  ---------------------------------------------------------------------------------------------------------------------   Results from  last 7 days   Lab Units 04/15/19  2200 04/15/19  1909   CK TOTAL U/L 41  --    CKMB ng/mL <1.00  --    TROPONIN T ng/mL <0.010 <0.010     Results from last 7 days   Lab Units 04/15/19  1909   PROBNP pg/mL <5.0*         Results from last 7 days   Lab Units 04/15/19  2330 04/15/19  1909   CRP mg/dL  --  1.62*   LACTATE mmol/L 3.1* 3.9*   WBC 10*3/mm3  --  5.23   HEMOGLOBIN g/dL  --  11.5*   HEMATOCRIT %  --  35.6   MCV fL  --  83.2   MCHC g/dL  --  32.3   PLATELETS 10*3/mm3  --  270   INR   --  1.45*     Results from last 7 days   Lab Units 04/15/19  1909   SODIUM mmol/L 137   POTASSIUM mmol/L 3.9   MAGNESIUM mg/dL 1.6   CHLORIDE mmol/L 98   CO2 mmol/L 19.1*   BUN mg/dL 12   CREATININE mg/dL 0.58   EGFR IF NONAFRICN AM mL/min/1.73 120   CALCIUM mg/dL 9.4   GLUCOSE mg/dL 260*   ALBUMIN g/dL 4.30   BILIRUBIN mg/dL 0.2   ALK PHOS U/L 80   AST (SGOT) U/L 37*   ALT (SGPT) U/L 33   Estimated Creatinine Clearance: 197.8 mL/min (by C-G formula based on SCr of 0.58 mg/dL).  No results found for: AMMONIA    Glucose   Date/Time Value Ref Range Status   04/15/2019 2006 258 (H) 70 - 130 mg/dL Final     No results found for: HGBA1C  Lab Results   Component Value Date    TSH 7.010 (H) 04/15/2019                      Pain Management Panel     Pain Management Panel Latest Ref Rng & Units 10/12/2018 11/28/2017    AMPHETAMINES SCREEN, URINE Negative Negative Negative    BARBITURATES SCREEN Negative Negative Negative    BENZODIAZEPINE SCREEN, URINE Negative Negative Negative    BUPRENORPHINE Negative Negative Negative    COCAINE SCREEN, URINE Negative Negative Negative    METHADONE SCREEN, URINE Negative Negative Negative    METHAMPHETAMINE Negative - -        I have personally reviewed the above laboratory results.   ---------------------------------------------------------------------------------------------------------------------  Imaging Results (last 7 days)     Procedure Component Value Units Date/Time    CT Chest Pulmonary  "Embolism With Contrast [[id number]] Updated:  04/15/19 2011    XR Chest 1 View [[id number]] Updated:  04/15/19 1931        I have personally reviewed the above radiology results.   ---------------------------------------------------------------------------------------------------------------------      Assessment & Plan        Assessment/Plan       ASSESSMENT:  1.  Chest pain      PLAN:  Patient is a 34-year-old female with a past medical history of factor V Leiden mutation, GERD, gout, hyperlipidemia, hypertension, hypothyroidism, kidney stones, migraine, neuropathy, DVT, diabetes mellitus, and depression.  She presents to the ED today with chief complaint of chest pain and shortness of breath.  She reports chest pain started this morning and has been continuous, worse on movement.  She reports pain is located substernal chest radiating into her back.  She describes pain as \"stabbing\".  Patient reports she has been short of breath and has had a dry cough for the last 2 days.  She reports generalized weakness and fatigue.  She states she was seen at [facility] earlier today and had an EKG performed, they advised her to go to the ED for further evaluation of symptoms.  Patient denies abdominal pain, nausea, vomiting, diarrhea, edema, dysuria, headache, syncopal episodes, dizziness, rectal bleeding, or hemoptysis.    Workup in the ED included: WBC 5.23.  TSH 7.010.  Magnesium 1.6.  CRP 1.62.  Lactate 3.9.  Troponin x2- negative. BNP less than 5.0.  PT/INR 17.8/1.45.  Creatinine 0.58 Sodium 137. Potassium 3.9.  Influenza negative.  Alysis negative.  Chest x-ray unremarkable.  CT chest PE protocol reveals no PE.  No aortic aneurysm or dissection.  No no acute pulmonary disease.  Mild cardiomegaly.  Per V rad.EKG reveals sinus tachycardia with a rate of 123 bpm.  Otherwise normal EKG.  No acute changes noted.    Patient admitted to the observation unit for further evaluation and monitoring.    Labs ordered in AM.  " Electrolyte replacement protocol ordered.  VT E prophylaxis ordered.  We will continue to monitor the patient on continuous pulse oximetry and cardiac monitoring.    Serial troponins and EKGs ordered, will monitor for any acute changes.    Blood cultures x2 ordered, pending results.     2D echocardiogram ordered.    We will continue to monitor the patient for any acute changes or abnormalities.        Patient's findings and recommendations were discussed with patient, family and nursing staff      Code Status:   Code Status and Medical Interventions:   Ordered at: 04/15/19 6612     Code Status:    CPR     Medical Interventions (Level of Support Prior to Arrest):    Full           Debbi Cunningham, APRN  04/16/19  2:49 AM

## 2019-04-16 NOTE — ED NOTES
Provider made aware that pt is continuing to complain of chest/back pain, new orders noted.     Aniya Dumont RN  04/15/19 9211

## 2019-04-16 NOTE — NURSING NOTE
Patient states she wants to go home, and will not stay in the hospital tonight. Benefits of staying and risks of leaving were explained to patient. Patient signed AMA form, provider was notified, and IV was removed.

## 2019-04-16 NOTE — DISCHARGE SUMMARY
"        DISCHARGE SUMMARY        Patient Identification:  Name:  Natalia Arzate  Age:  34 y.o.  Sex:  female  :  1985  MRN:  [id number]  Visit Number:  [id number]    Date of Admission: 4/15/2019  Date of Discharge:  2019    PCP: Leno Edmonds APRN    Discharging Provider: YEIMI Torrez      Discharge Diagnoses     Pt left AMA.      Consults/Procedures     Consults:   Consults     No orders found for last 30 day(s).          Procedures Performed:         History of Presenting Illness     Patient is a 34-year-old female with a past medical history of factor V Leiden mutation, GERD, gout, hyperlipidemia, hypertension, hypothyroidism, kidney stones, migraine, neuropathy, DVT, diabetes mellitus, and depression.  She presents to the ED today with chief complaint of chest pain and shortness of breath.  She reports chest pain started this morning and has been continuous, worse on movement.  She reports pain is located substernal chest radiating into her back.  She describes pain as \"stabbing\".  Patient reports she has been short of breath and has had a dry cough for the last 2 days.  She reports generalized weakness and fatigue.  She states she was seen at [facility] earlier today and had an EKG performed, they advised her to go to the ED for further evaluation of symptoms.  Patient denies abdominal pain, nausea, vomiting, diarrhea, edema, dysuria, headache, syncopal episodes, dizziness, rectal bleeding, or hemoptysis.      See admitting history and physical for additional information.      Hospital Course     Patient was admitted to the Observation unit for chest pain.  After arrival to the observation unit patient verbalized to nursing staff that she wanted to sign out AMA and go home.  Patient requesting additional pain medication.  Patient was given up to 6 mg of morphine while in the ED prior to arrival to the unit.  Nitroglycerin paste was ordered for chest pain at time of arrival to " the unit. Patient became upset and refused nitroglycerin.  Patient not willing to stay overnight for observation notified of risk to leaving AMA, verbalizes understanding.  Patient IV was removed by nursing staff and patient left facility accompanied by her .    Discharge Vitals/Physical Examination     Vital Signs:  Temp:  [98.4 °F (36.9 °C)-98.8 °F (37.1 °C)] 98.4 °F (36.9 °C)  Heart Rate:  [] 96  Resp:  [20] 20  BP: (113-163)/() 151/90  Mean Arterial Pressure (Non-Invasive) for the past 24 hrs (Last 3 readings):   Noninvasive MAP (mmHg)   04/15/19 2357 108   04/15/19 2321 110   04/15/19 2302 120     SpO2 Percentage    04/15/19 2302 04/15/19 2321 04/15/19 2357   SpO2: 95% 96% 96%     SpO2:  [95 %-99 %] 96 %  on   ;   Device (Oxygen Therapy): room air    Body mass index is 54.87 kg/m².  Wt Readings from Last 3 Encounters:   04/15/19 (!) 145 kg (319 lb 10.7 oz)   03/12/19 (!) 138 kg (305 lb)   02/05/19 (!) 143 kg (315 lb)         Physical Exam:    Left AMA.      Pertinent Laboratory/Radiology Results     Pertinent Laboratory Results:    Results from last 7 days   Lab Units 04/15/19  2200 04/15/19  1909   CK TOTAL U/L 41  --    CKMB ng/mL <1.00  --    TROPONIN T ng/mL <0.010 <0.010     Results from last 7 days   Lab Units 04/15/19  1909   PROBNP pg/mL <5.0*         Results from last 7 days   Lab Units 04/15/19  2330 04/15/19  1909   CRP mg/dL  --  1.62*   LACTATE mmol/L 3.1* 3.9*   WBC 10*3/mm3  --  5.23   HEMOGLOBIN g/dL  --  11.5*   HEMATOCRIT %  --  35.6   MCV fL  --  83.2   MCHC g/dL  --  32.3   PLATELETS 10*3/mm3  --  270   INR   --  1.45*     Results from last 7 days   Lab Units 04/15/19  1909   SODIUM mmol/L 137   POTASSIUM mmol/L 3.9   MAGNESIUM mg/dL 1.6   CHLORIDE mmol/L 98   CO2 mmol/L 19.1*   BUN mg/dL 12   CREATININE mg/dL 0.58   EGFR IF NONAFRICN AM mL/min/1.73 120   CALCIUM mg/dL 9.4   GLUCOSE mg/dL 260*   ALBUMIN g/dL 4.30   BILIRUBIN mg/dL 0.2   ALK PHOS U/L 80   AST (SGOT) U/L 37*    ALT (SGPT) U/L 33   Estimated Creatinine Clearance: 197.8 mL/min (by C-G formula based on SCr of 0.58 mg/dL).  No results found for: AMMONIA    Glucose   Date/Time Value Ref Range Status   04/15/2019 2006 258 (H) 70 - 130 mg/dL Final     No results found for: HGBA1C  Lab Results   Component Value Date    TSH 7.010 (H) 04/15/2019                      Pain Management Panel     Pain Management Panel Latest Ref Rng & Units 10/12/2018 11/28/2017    AMPHETAMINES SCREEN, URINE Negative Negative Negative    BARBITURATES SCREEN Negative Negative Negative    BENZODIAZEPINE SCREEN, URINE Negative Negative Negative    BUPRENORPHINE Negative Negative Negative    COCAINE SCREEN, URINE Negative Negative Negative    METHADONE SCREEN, URINE Negative Negative Negative    METHAMPHETAMINE Negative - -          Pertinent Radiology Results:  Imaging Results (all)     Procedure Component Value Units Date/Time    CT Chest Pulmonary Embolism With Contrast [[id number]] Updated:  04/15/19 2011    XR Chest 1 View [[id number]] Updated:  04/15/19 1931          Test Results Pending at Discharge:   Order Current Status    Blood Culture - Blood, Arm, Left In process    Blood Culture - Blood, Arm, Right In process          Discharge Disposition/Discharge Medications/Discharge Appointments     Discharge Disposition:   Left Against Medical Advice    Condition at Discharge:  Stable.  Patient left AMA.    Code Status While Inpatient:  Code Status and Medical Interventions:   Ordered at: 04/15/19 2258     Code Status:    CPR     Medical Interventions (Level of Support Prior to Arrest):    Full       Discharge Medications:     Discharge Medications      ASK your doctor about these medications      Instructions Start Date   albuterol sulfate  (90 Base) MCG/ACT inhaler  Commonly known as:  PROVENTIL HFA;VENTOLIN HFA;PROAIR HFA   2 puffs, Inhalation, Every 4 Hours PRN      allopurinol 300 MG tablet  Commonly known as:  ZYLOPRIM   300 mg, Oral, Daily       aspirin 81 MG EC tablet   81 mg, Oral, Daily      azelastine 0.1 % nasal spray  Commonly known as:  ASTELIN   2 sprays, Nasal, 2 Times Daily, Use in each nostril as directed       buPROPion  MG 12 hr tablet  Commonly known as:  WELLBUTRIN SR   150 mg, Oral, 2 Times Daily      cetirizine 10 MG tablet  Commonly known as:  zyrTEC   10 mg, Oral, Daily      cholecalciferol 1000 units tablet  Commonly known as:  VITAMIN D3   1,000 Units, Oral, Daily      cyclobenzaprine 10 MG tablet  Commonly known as:  FLEXERIL   10 mg, Oral, 3 Times Daily PRN      diclofenac 75 MG EC tablet  Commonly known as:  VOLTAREN   75 mg, Oral, 2 Times Daily      diflunisal 500 MG tablet tablet  Commonly known as:  DOLOBID   500 mg, Oral, 2 Times Daily      DULoxetine 60 MG capsule  Commonly known as:  CYMBALTA   60 mg, Oral, Daily      fish oil 1000 MG capsule capsule   1,000 mg, Oral, 3 Times Daily With Meals      FLUoxetine 20 MG capsule  Commonly known as:  PROzac   40 mg, Oral, Daily      fluticasone 50 MCG/ACT nasal spray  Commonly known as:  FLONASE   2 sprays, Nasal, Daily, Administer 2 sprays in each nostril for each dose.       folic acid 1 MG tablet  Commonly known as:  FOLVITE   1 mg, Oral, Daily      gabapentin 800 MG tablet  Commonly known as:  NEURONTIN   800 mg, Oral, 4 Times Daily      glimepiride 2 MG tablet  Commonly known as:  AMARYL   4 mg, Oral, Every Morning Before Breakfast      guaifenesin-dextromethorphan  MG tablet sustained-release 12 hour tablet   2 tablets, Oral, Every 12 Hours Scheduled      hydrochlorothiazide 25 MG tablet  Commonly known as:  HYDRODIURIL   25 mg, Oral, Daily      hydrOXYzine 25 MG tablet  Commonly known as:  ATARAX   25 mg, Oral, 3 Times Daily PRN      levothyroxine 25 MCG tablet  Commonly known as:  SYNTHROID, LEVOTHROID   25 mcg, Oral, Daily      meclizine 12.5 MG tablet  Commonly known as:  ANTIVERT   12.5 mg, Oral, 3 Times Daily PRN      meloxicam 15 MG tablet  Commonly known  as:  MOBIC   15 mg, Oral, Daily      metaxalone 800 MG tablet  Commonly known as:  SKELAXIN   800 mg, Oral, 3 Times Daily PRN      metFORMIN 500 MG tablet  Commonly known as:  GLUCOPHAGE   500 mg, Oral, Daily With Breakfast, Lunch & Dinner      methocarbamol 750 MG tablet  Commonly known as:  ROBAXIN   750 mg, Oral, 3 Times Daily      metoprolol succinate XL 25 MG 24 hr tablet  Commonly known as:  TOPROL-XL   25 mg, Oral, Daily      miconazole 2 % vaginal cream  Commonly known as:  MICOTIN   1 applicator, Vaginal, Nightly      mometasone-formoterol 200-5 MCG/ACT inhaler  Commonly known as:  DULERA 200   2 puffs, Inhalation, 2 Times Daily - RT      montelukast 10 MG tablet  Commonly known as:  SINGULAIR   10 mg, Oral, Nightly      MULTIVITAMIN ADULTS PO   Oral      omeprazole 20 MG capsule  Commonly known as:  priLOSEC   20 mg, Oral, Daily      ondansetron 4 MG tablet  Commonly known as:  ZOFRAN   4 mg, Oral, Every 6 Hours PRN      oxyCODONE-acetaminophen 5-325 MG per tablet  Commonly known as:  PERCOCET   1 tablet, Oral, Every 4 Hours PRN      phenazopyridine 200 MG tablet  Commonly known as:  PYRIDIUM   200 mg, Oral, 3 Times Daily PRN      raNITIdine 150 MG tablet  Commonly known as:  ZANTAC   150 mg, Oral, 2 Times Daily      SUMAtriptan 100 MG tablet  Commonly known as:  IMITREX   100 mg, Oral, Every 2 Hours PRN      tamsulosin 0.4 MG capsule 24 hr capsule  Commonly known as:  FLOMAX   1 capsule, Oral, Daily      tiZANidine 4 MG tablet  Commonly known as:  ZANAFLEX   4 mg, Oral, 2 Times Daily      topiramate 100 MG tablet  Commonly known as:  TOPAMAX   100 mg, Oral, 2 Times Daily      traMADol 50 MG tablet  Commonly known as:  ULTRAM   50 mg, Oral, Every 8 Hours PRN      vitamin B-12 500 MCG tablet  Commonly known as:  CYANOCOBALAMIN   500 mcg, Oral, Daily      warfarin 10 MG tablet  Commonly known as:  COUMADIN   12 mg, Oral, Daily Warfarin             Discharge Diet:  Left AMA    Discharge Activity:  Left  AMA    Discharge Appointments:      Follow-up Information     Leno Edmonds APRN Follow up in 1 week(s).    Specialty:  Nurse Practitioner  Contact information:  [address] [place], [place] [address]  [facility] [address] [phone]                           YEIMI Torrez  04/16/19  3:05 AM          Please note that this discharge summary required more than 30 minutes to complete.

## 2019-04-18 ENCOUNTER — HOSPITAL ENCOUNTER (EMERGENCY)
Facility: HOSPITAL | Age: 34
Discharge: HOME | End: 2019-04-18
Admitting: NURSE PRACTITIONER

## 2019-04-18 VITALS
OXYGEN SATURATION: 98 % | BODY MASS INDEX: 50.02 KG/M2 | HEIGHT: 64 IN | SYSTOLIC BLOOD PRESSURE: 155 MMHG | RESPIRATION RATE: 16 BRPM | WEIGHT: 293 LBS | HEART RATE: 90 BPM | TEMPERATURE: 97.6 F | DIASTOLIC BLOOD PRESSURE: 99 MMHG

## 2019-04-18 DIAGNOSIS — N23 RENAL COLIC: Primary | ICD-10-CM

## 2019-04-18 LAB
ALBUMIN SERPL-MCNC: 4.3 G/DL (ref 3.5–5.2)
ALBUMIN/GLOB SERPL: 1.2 G/DL
ALP SERPL-CCNC: 79 U/L (ref 39–117)
ALT SERPL W P-5'-P-CCNC: 38 U/L (ref 1–33)
ANION GAP SERPL CALCULATED.3IONS-SCNC: 15.7 MMOL/L
AST SERPL-CCNC: 55 U/L (ref 1–32)
BACTERIA UR QL AUTO: ABNORMAL /HPF
BASOPHILS # BLD AUTO: 0.04 10*3/MM3 (ref 0–0.2)
BASOPHILS NFR BLD AUTO: 0.7 % (ref 0–1.5)
BILIRUB SERPL-MCNC: 0.3 MG/DL (ref 0.2–1.2)
BILIRUB UR QL STRIP: NEGATIVE
BUN BLD-MCNC: 9 MG/DL (ref 6–20)
BUN/CREAT SERPL: 15.3 (ref 7–25)
CALCIUM SPEC-SCNC: 9.4 MG/DL (ref 8.6–10.5)
CHLORIDE SERPL-SCNC: 103 MMOL/L (ref 98–107)
CLARITY UR: ABNORMAL
CO2 SERPL-SCNC: 21.3 MMOL/L (ref 22–29)
COLOR UR: YELLOW
CREAT BLD-MCNC: 0.59 MG/DL (ref 0.57–1)
DEPRECATED RDW RBC AUTO: 46.1 FL (ref 37–54)
EOSINOPHIL # BLD AUTO: 0.15 10*3/MM3 (ref 0–0.4)
EOSINOPHIL NFR BLD AUTO: 2.7 % (ref 0.3–6.2)
ERYTHROCYTE [DISTWIDTH] IN BLOOD BY AUTOMATED COUNT: 15.2 % (ref 12.3–15.4)
GFR SERPL CREATININE-BSD FRML MDRD: 117 ML/MIN/1.73
GLOBULIN UR ELPH-MCNC: 3.7 GM/DL
GLUCOSE BLD-MCNC: 236 MG/DL (ref 65–99)
GLUCOSE UR STRIP-MCNC: NEGATIVE MG/DL
HCT VFR BLD AUTO: 34.3 % (ref 34–46.6)
HGB BLD-MCNC: 11.1 G/DL (ref 12–15.9)
HGB UR QL STRIP.AUTO: ABNORMAL
HYALINE CASTS UR QL AUTO: ABNORMAL /LPF
IMM GRANULOCYTES # BLD AUTO: 0.01 10*3/MM3 (ref 0–0.05)
IMM GRANULOCYTES NFR BLD AUTO: 0.2 % (ref 0–0.5)
KETONES UR QL STRIP: NEGATIVE
LEUKOCYTE ESTERASE UR QL STRIP.AUTO: NEGATIVE
LYMPHOCYTES # BLD AUTO: 2 10*3/MM3 (ref 0.7–3.1)
LYMPHOCYTES NFR BLD AUTO: 36.2 % (ref 19.6–45.3)
MCH RBC QN AUTO: 27.3 PG (ref 26.6–33)
MCHC RBC AUTO-ENTMCNC: 32.4 G/DL (ref 31.5–35.7)
MCV RBC AUTO: 84.3 FL (ref 79–97)
MONOCYTES # BLD AUTO: 0.6 10*3/MM3 (ref 0.1–0.9)
MONOCYTES NFR BLD AUTO: 10.8 % (ref 5–12)
NEUTROPHILS # BLD AUTO: 2.73 10*3/MM3 (ref 1.4–7)
NEUTROPHILS NFR BLD AUTO: 49.4 % (ref 42.7–76)
NITRITE UR QL STRIP: NEGATIVE
PH UR STRIP.AUTO: <=5 [PH] (ref 5–8)
PLATELET # BLD AUTO: 298 10*3/MM3 (ref 140–450)
PMV BLD AUTO: 9.3 FL (ref 6–12)
POTASSIUM BLD-SCNC: 4.3 MMOL/L (ref 3.5–5.2)
PROT SERPL-MCNC: 8 G/DL (ref 6–8.5)
PROT UR QL STRIP: ABNORMAL
RBC # BLD AUTO: 4.07 10*6/MM3 (ref 3.77–5.28)
RBC # UR: ABNORMAL /HPF
REF LAB TEST METHOD: ABNORMAL
SODIUM BLD-SCNC: 140 MMOL/L (ref 136–145)
SP GR UR STRIP: 1.02 (ref 1–1.03)
SQUAMOUS #/AREA URNS HPF: ABNORMAL /HPF
UROBILINOGEN UR QL STRIP: ABNORMAL
WBC NRBC COR # BLD: 5.53 10*3/MM3 (ref 3.4–10.8)
WBC UR QL AUTO: ABNORMAL /HPF

## 2019-04-18 PROCEDURE — 80053 COMPREHEN METABOLIC PANEL: CPT | Performed by: NURSE PRACTITIONER

## 2019-04-18 PROCEDURE — 81001 URINALYSIS AUTO W/SCOPE: CPT | Performed by: NURSE PRACTITIONER

## 2019-04-18 PROCEDURE — 99284 EMERGENCY DEPT VISIT MOD MDM: CPT

## 2019-04-18 PROCEDURE — 85025 COMPLETE CBC W/AUTO DIFF WBC: CPT | Performed by: NURSE PRACTITIONER

## 2019-04-18 RX ORDER — HYDROCODONE BITARTRATE AND ACETAMINOPHEN 7.5; 325 MG/1; MG/1
1 TABLET ORAL EVERY 6 HOURS PRN
Qty: 10 TABLET | Refills: 0 | Status: SHIPPED | OUTPATIENT
Start: 2019-04-18 | End: 2019-04-25

## 2019-04-18 RX ORDER — HYDROCODONE BITARTRATE AND ACETAMINOPHEN 7.5; 325 MG/1; MG/1
1 TABLET ORAL ONCE
Status: COMPLETED | OUTPATIENT
Start: 2019-04-18 | End: 2019-04-18

## 2019-04-18 RX ORDER — OXYCODONE HYDROCHLORIDE AND ACETAMINOPHEN 5; 325 MG/1; MG/1
1 TABLET ORAL ONCE
Status: COMPLETED | OUTPATIENT
Start: 2019-04-18 | End: 2019-04-18

## 2019-04-18 RX ORDER — PROMETHAZINE HYDROCHLORIDE 25 MG/1
25 TABLET ORAL EVERY 6 HOURS PRN
Qty: 20 TABLET | Refills: 0 | Status: SHIPPED | OUTPATIENT
Start: 2019-04-18 | End: 2019-04-25

## 2019-04-18 RX ADMIN — OXYCODONE HYDROCHLORIDE AND ACETAMINOPHEN 1 TABLET: 5; 325 TABLET ORAL at 19:05

## 2019-04-18 RX ADMIN — HYDROCODONE BITARTRATE AND ACETAMINOPHEN 1 TABLET: 7.5; 325 TABLET ORAL at 17:19

## 2019-04-20 LAB
BACTERIA SPEC AEROBE CULT: NORMAL
BACTERIA SPEC AEROBE CULT: NORMAL

## 2019-04-25 ENCOUNTER — APPOINTMENT (OUTPATIENT)
Dept: CT IMAGING | Facility: HOSPITAL | Age: 34
End: 2019-04-25

## 2019-04-25 ENCOUNTER — HOSPITAL ENCOUNTER (OUTPATIENT)
Facility: HOSPITAL | Age: 34
Setting detail: OBSERVATION
Discharge: LEFT BEFORE DISCHARGE | End: 2019-04-25
Attending: EMERGENCY MEDICINE | Admitting: OBSTETRICS & GYNECOLOGY

## 2019-04-25 ENCOUNTER — APPOINTMENT (OUTPATIENT)
Dept: ULTRASOUND IMAGING | Facility: HOSPITAL | Age: 34
End: 2019-04-25

## 2019-04-25 VITALS
BODY MASS INDEX: 50.02 KG/M2 | WEIGHT: 293 LBS | HEIGHT: 64 IN | DIASTOLIC BLOOD PRESSURE: 85 MMHG | TEMPERATURE: 98.2 F | OXYGEN SATURATION: 95 % | SYSTOLIC BLOOD PRESSURE: 140 MMHG | HEART RATE: 96 BPM | RESPIRATION RATE: 20 BRPM

## 2019-04-25 DIAGNOSIS — D27.0 OVARIAN TERATOMA, RIGHT: Primary | ICD-10-CM

## 2019-04-25 PROBLEM — Z86.718 HISTORY OF DVT IN ADULTHOOD: Status: ACTIVE | Noted: 2019-04-25

## 2019-04-25 PROBLEM — G89.29 OTHER CHRONIC PAIN: Status: ACTIVE | Noted: 2019-04-25

## 2019-04-25 PROBLEM — E66.01 MORBID OBESITY WITH BMI OF 50.0-59.9, ADULT (HCC): Status: ACTIVE | Noted: 2019-04-25

## 2019-04-25 PROBLEM — Z86.711 HISTORY OF PULMONARY EMBOLISM: Status: ACTIVE | Noted: 2019-04-25

## 2019-04-25 PROBLEM — R10.2 PELVIC PAIN: Status: ACTIVE | Noted: 2019-04-25

## 2019-04-25 LAB
6-ACETYL MORPHINE: NEGATIVE
ALBUMIN SERPL-MCNC: 4.1 G/DL (ref 3.5–5.2)
ALBUMIN/GLOB SERPL: 1.2 G/DL
ALP SERPL-CCNC: 79 U/L (ref 39–117)
ALT SERPL W P-5'-P-CCNC: 49 U/L (ref 1–33)
AMPHET+METHAMPHET UR QL: NEGATIVE
ANION GAP SERPL CALCULATED.3IONS-SCNC: 14.3 MMOL/L
AST SERPL-CCNC: 76 U/L (ref 1–32)
BARBITURATES UR QL SCN: NEGATIVE
BASOPHILS # BLD AUTO: 0.01 10*3/MM3 (ref 0–0.2)
BASOPHILS NFR BLD AUTO: 0.2 % (ref 0–1.5)
BENZODIAZ UR QL SCN: NEGATIVE
BILIRUB SERPL-MCNC: 0.3 MG/DL (ref 0.2–1.2)
BILIRUB UR QL STRIP: NEGATIVE
BUN BLD-MCNC: 8 MG/DL (ref 6–20)
BUN/CREAT SERPL: 15.4 (ref 7–25)
BUPRENORPHINE SERPL-MCNC: NEGATIVE NG/ML
CALCIUM SPEC-SCNC: 9.2 MG/DL (ref 8.6–10.5)
CANNABINOIDS SERPL QL: NEGATIVE
CHLORIDE SERPL-SCNC: 105 MMOL/L (ref 98–107)
CLARITY UR: CLEAR
CO2 SERPL-SCNC: 21.7 MMOL/L (ref 22–29)
COCAINE UR QL: NEGATIVE
COLOR UR: YELLOW
CREAT BLD-MCNC: 0.52 MG/DL (ref 0.57–1)
DEPRECATED RDW RBC AUTO: 45.4 FL (ref 37–54)
EOSINOPHIL # BLD AUTO: 0.09 10*3/MM3 (ref 0–0.4)
EOSINOPHIL NFR BLD AUTO: 2.2 % (ref 0.3–6.2)
ERYTHROCYTE [DISTWIDTH] IN BLOOD BY AUTOMATED COUNT: 15.4 % (ref 12.3–15.4)
GFR SERPL CREATININE-BSD FRML MDRD: 136 ML/MIN/1.73
GLOBULIN UR ELPH-MCNC: 3.3 GM/DL
GLUCOSE BLD-MCNC: 257 MG/DL (ref 65–99)
GLUCOSE UR STRIP-MCNC: ABNORMAL MG/DL
HCG SERPL QL: NEGATIVE
HCT VFR BLD AUTO: 31.6 % (ref 34–46.6)
HGB BLD-MCNC: 10 G/DL (ref 12–15.9)
HGB UR QL STRIP.AUTO: NEGATIVE
HOLD SPECIMEN: NORMAL
IMM GRANULOCYTES # BLD AUTO: 0.01 10*3/MM3 (ref 0–0.05)
IMM GRANULOCYTES NFR BLD AUTO: 0.2 % (ref 0–0.5)
KETONES UR QL STRIP: NEGATIVE
LEUKOCYTE ESTERASE UR QL STRIP.AUTO: NEGATIVE
LIPASE SERPL-CCNC: 40 U/L (ref 13–60)
LYMPHOCYTES # BLD AUTO: 1.41 10*3/MM3 (ref 0.7–3.1)
LYMPHOCYTES NFR BLD AUTO: 35.1 % (ref 19.6–45.3)
MCH RBC QN AUTO: 26.5 PG (ref 26.6–33)
MCHC RBC AUTO-ENTMCNC: 31.6 G/DL (ref 31.5–35.7)
MCV RBC AUTO: 83.6 FL (ref 79–97)
METHADONE UR QL SCN: NEGATIVE
MONOCYTES # BLD AUTO: 0.41 10*3/MM3 (ref 0.1–0.9)
MONOCYTES NFR BLD AUTO: 10.2 % (ref 5–12)
NEUTROPHILS # BLD AUTO: 2.09 10*3/MM3 (ref 1.7–7)
NEUTROPHILS NFR BLD AUTO: 52.1 % (ref 42.7–76)
NITRITE UR QL STRIP: NEGATIVE
OPIATES UR QL: POSITIVE
OXYCODONE UR QL SCN: NEGATIVE
PCP UR QL SCN: NEGATIVE
PH UR STRIP.AUTO: <=5 [PH] (ref 5–8)
PLATELET # BLD AUTO: 206 10*3/MM3 (ref 140–450)
PMV BLD AUTO: 8.7 FL (ref 6–12)
POTASSIUM BLD-SCNC: 4.1 MMOL/L (ref 3.5–5.2)
PROT SERPL-MCNC: 7.4 G/DL (ref 6–8.5)
PROT UR QL STRIP: NEGATIVE
RBC # BLD AUTO: 3.78 10*6/MM3 (ref 3.77–5.28)
SODIUM BLD-SCNC: 141 MMOL/L (ref 136–145)
SP GR UR STRIP: 1.02 (ref 1–1.03)
UROBILINOGEN UR QL STRIP: ABNORMAL
WBC NRBC COR # BLD: 4.02 10*3/MM3 (ref 3.4–10.8)
WHOLE BLOOD HOLD SPECIMEN: NORMAL
WHOLE BLOOD HOLD SPECIMEN: NORMAL

## 2019-04-25 PROCEDURE — 74176 CT ABD & PELVIS W/O CONTRAST: CPT

## 2019-04-25 PROCEDURE — 84703 CHORIONIC GONADOTROPIN ASSAY: CPT | Performed by: EMERGENCY MEDICINE

## 2019-04-25 PROCEDURE — 76830 TRANSVAGINAL US NON-OB: CPT | Performed by: RADIOLOGY

## 2019-04-25 PROCEDURE — 80307 DRUG TEST PRSMV CHEM ANLYZR: CPT | Performed by: OBSTETRICS & GYNECOLOGY

## 2019-04-25 PROCEDURE — 25010000002 MORPHINE PER 10 MG: Performed by: EMERGENCY MEDICINE

## 2019-04-25 PROCEDURE — 76830 TRANSVAGINAL US NON-OB: CPT

## 2019-04-25 PROCEDURE — 80053 COMPREHEN METABOLIC PANEL: CPT | Performed by: EMERGENCY MEDICINE

## 2019-04-25 PROCEDURE — 96376 TX/PRO/DX INJ SAME DRUG ADON: CPT

## 2019-04-25 PROCEDURE — 25010000002 ONDANSETRON PER 1 MG: Performed by: EMERGENCY MEDICINE

## 2019-04-25 PROCEDURE — G0378 HOSPITAL OBSERVATION PER HR: HCPCS

## 2019-04-25 PROCEDURE — 81003 URINALYSIS AUTO W/O SCOPE: CPT | Performed by: EMERGENCY MEDICINE

## 2019-04-25 PROCEDURE — 96375 TX/PRO/DX INJ NEW DRUG ADDON: CPT

## 2019-04-25 PROCEDURE — P9612 CATHETERIZE FOR URINE SPEC: HCPCS

## 2019-04-25 PROCEDURE — 99284 EMERGENCY DEPT VISIT MOD MDM: CPT

## 2019-04-25 PROCEDURE — 74176 CT ABD & PELVIS W/O CONTRAST: CPT | Performed by: RADIOLOGY

## 2019-04-25 PROCEDURE — 85025 COMPLETE CBC W/AUTO DIFF WBC: CPT | Performed by: EMERGENCY MEDICINE

## 2019-04-25 PROCEDURE — 96374 THER/PROPH/DIAG INJ IV PUSH: CPT

## 2019-04-25 PROCEDURE — 83690 ASSAY OF LIPASE: CPT | Performed by: EMERGENCY MEDICINE

## 2019-04-25 RX ORDER — INSULIN GLARGINE 100 [IU]/ML
30 INJECTION, SOLUTION SUBCUTANEOUS EVERY MORNING
COMMUNITY
End: 2021-01-20

## 2019-04-25 RX ORDER — OMEGA-3S/DHA/EPA/FISH OIL/D3 300MG-1000
2000 CAPSULE ORAL NIGHTLY
Status: CANCELLED | OUTPATIENT
Start: 2019-04-25

## 2019-04-25 RX ORDER — MELATONIN
2000 NIGHTLY
COMMUNITY
End: 2022-02-18

## 2019-04-25 RX ORDER — FLUTICASONE PROPIONATE 50 MCG
2 SPRAY, SUSPENSION (ML) NASAL DAILY
Status: CANCELLED | OUTPATIENT
Start: 2019-04-25

## 2019-04-25 RX ORDER — HYDROCHLOROTHIAZIDE 25 MG/1
25 TABLET ORAL DAILY
Status: CANCELLED | OUTPATIENT
Start: 2019-04-25

## 2019-04-25 RX ORDER — METOCLOPRAMIDE HYDROCHLORIDE 5 MG/ML
10 INJECTION INTRAMUSCULAR; INTRAVENOUS EVERY 6 HOURS PRN
Status: DISCONTINUED | OUTPATIENT
Start: 2019-04-25 | End: 2019-04-25 | Stop reason: HOSPADM

## 2019-04-25 RX ORDER — OXYCODONE HYDROCHLORIDE AND ACETAMINOPHEN 5; 325 MG/1; MG/1
1 TABLET ORAL EVERY 4 HOURS PRN
Status: DISCONTINUED | OUTPATIENT
Start: 2019-04-25 | End: 2019-04-25 | Stop reason: HOSPADM

## 2019-04-25 RX ORDER — SODIUM CHLORIDE 9 MG/ML
125 INJECTION, SOLUTION INTRAVENOUS CONTINUOUS
Status: DISCONTINUED | OUTPATIENT
Start: 2019-04-25 | End: 2019-04-25 | Stop reason: HOSPADM

## 2019-04-25 RX ORDER — CHOLECALCIFEROL (VITAMIN D3) 125 MCG
5 CAPSULE ORAL NIGHTLY
COMMUNITY
End: 2019-08-30

## 2019-04-25 RX ORDER — ALBUTEROL SULFATE 2.5 MG/3ML
2.5 SOLUTION RESPIRATORY (INHALATION) EVERY 4 HOURS PRN
Status: CANCELLED | OUTPATIENT
Start: 2019-04-25

## 2019-04-25 RX ORDER — FLUOXETINE HYDROCHLORIDE 40 MG/1
40 CAPSULE ORAL DAILY
COMMUNITY
End: 2021-01-20

## 2019-04-25 RX ORDER — CETIRIZINE HYDROCHLORIDE 10 MG/1
10 TABLET ORAL NIGHTLY
Status: CANCELLED | OUTPATIENT
Start: 2019-04-25

## 2019-04-25 RX ORDER — LEVOTHYROXINE SODIUM 0.03 MG/1
25 TABLET ORAL DAILY
Status: CANCELLED | OUTPATIENT
Start: 2019-04-26

## 2019-04-25 RX ORDER — SODIUM CHLORIDE 9 MG/ML
75 INJECTION, SOLUTION INTRAVENOUS CONTINUOUS
Status: DISCONTINUED | OUTPATIENT
Start: 2019-04-25 | End: 2019-04-25 | Stop reason: HOSPADM

## 2019-04-25 RX ORDER — BUPROPION HYDROCHLORIDE 150 MG/1
150 TABLET, EXTENDED RELEASE ORAL EVERY 12 HOURS SCHEDULED
Status: CANCELLED | OUTPATIENT
Start: 2019-04-25

## 2019-04-25 RX ORDER — MULTIVITAMIN/IRON/FOLIC ACID 18MG-0.4MG
1 TABLET ORAL NIGHTLY
COMMUNITY
End: 2021-01-20

## 2019-04-25 RX ORDER — ASCORBIC ACID 500 MG
500 TABLET ORAL DAILY
COMMUNITY
End: 2021-01-20

## 2019-04-25 RX ORDER — FAMOTIDINE 10 MG/ML
20 INJECTION, SOLUTION INTRAVENOUS ONCE
Status: COMPLETED | OUTPATIENT
Start: 2019-04-25 | End: 2019-04-25

## 2019-04-25 RX ORDER — WARFARIN SODIUM 4 MG/1
8 TABLET ORAL 3 TIMES WEEKLY
COMMUNITY
End: 2019-08-30

## 2019-04-25 RX ORDER — ONDANSETRON 2 MG/ML
4 INJECTION INTRAMUSCULAR; INTRAVENOUS ONCE
Status: COMPLETED | OUTPATIENT
Start: 2019-04-25 | End: 2019-04-25

## 2019-04-25 RX ORDER — ATORVASTATIN CALCIUM 20 MG/1
20 TABLET, FILM COATED ORAL NIGHTLY
Status: CANCELLED | OUTPATIENT
Start: 2019-04-25

## 2019-04-25 RX ORDER — ATORVASTATIN CALCIUM 20 MG/1
20 TABLET, FILM COATED ORAL NIGHTLY
COMMUNITY
End: 2021-01-20

## 2019-04-25 RX ORDER — FLUOXETINE HYDROCHLORIDE 20 MG/1
40 CAPSULE ORAL DAILY
Status: CANCELLED | OUTPATIENT
Start: 2019-04-25

## 2019-04-25 RX ORDER — ONDANSETRON 2 MG/ML
4 INJECTION INTRAMUSCULAR; INTRAVENOUS EVERY 6 HOURS PRN
Status: DISCONTINUED | OUTPATIENT
Start: 2019-04-25 | End: 2019-04-25 | Stop reason: HOSPADM

## 2019-04-25 RX ORDER — HYDROXYZINE HYDROCHLORIDE 25 MG/1
25 TABLET, FILM COATED ORAL NIGHTLY
Status: CANCELLED | OUTPATIENT
Start: 2019-04-25

## 2019-04-25 RX ORDER — FOLIC ACID 1 MG/1
1 TABLET ORAL NIGHTLY
Status: CANCELLED | OUTPATIENT
Start: 2019-04-25

## 2019-04-25 RX ORDER — ALLOPURINOL 300 MG/1
300 TABLET ORAL NIGHTLY
Status: CANCELLED | OUTPATIENT
Start: 2019-04-25

## 2019-04-25 RX ORDER — WARFARIN SODIUM 10 MG/1
10 TABLET ORAL
COMMUNITY
End: 2021-01-20

## 2019-04-25 RX ORDER — DULOXETIN HYDROCHLORIDE 60 MG/1
60 CAPSULE, DELAYED RELEASE ORAL NIGHTLY
Status: CANCELLED | OUTPATIENT
Start: 2019-04-25

## 2019-04-25 RX ADMIN — MORPHINE SULFATE 4 MG: 4 INJECTION INTRAVENOUS at 15:06

## 2019-04-25 RX ADMIN — MORPHINE SULFATE 4 MG: 4 INJECTION INTRAVENOUS at 11:42

## 2019-04-25 RX ADMIN — SODIUM CHLORIDE 125 ML/HR: 9 INJECTION, SOLUTION INTRAVENOUS at 11:43

## 2019-04-25 RX ADMIN — MORPHINE SULFATE 4 MG: 4 INJECTION INTRAVENOUS at 13:15

## 2019-04-25 RX ADMIN — FAMOTIDINE 20 MG: 10 INJECTION, SOLUTION INTRAVENOUS at 11:42

## 2019-04-25 RX ADMIN — ONDANSETRON 4 MG: 2 INJECTION, SOLUTION INTRAMUSCULAR; INTRAVENOUS at 11:42

## 2019-04-25 RX ADMIN — SODIUM CHLORIDE 500 ML: 9 INJECTION, SOLUTION INTRAVENOUS at 11:42

## 2019-04-25 NOTE — NURSING NOTE
Was called to room by pt  States she thought doctor was going to do surgery/wants to leave and go home/  States I can always come back to the hospital if I get worse/made aware that she could get worse/worsening symptoms  States is will return if needed   Dr hughes contacted and made aware  House supervisor notified as well

## 2019-04-25 NOTE — NURSING NOTE
Pt made aware that she can return to hospital if she becomes worse/that she may not drive/pt is leaving with her boyfriend cas bower who will drive her home

## 2019-04-25 NOTE — ED PROVIDER NOTES
"Subjective   Patient is a 34-year-old female who presents today with a chief complaint of \"I think I have a kidney stone\"... Diffuse right-sided abdominal/flank pain, onset this morning.  She reports that this has been accompanied by nausea and vomiting.  She denies fever, chills, chest pain, shortness of breath, hematemesis, hematochezia or melena.  She does report dysuria.  She denies other symptoms or complaints.  In the past she has been found to have a right adnexal mass that has been felt to be a teratoma; most recently this was documented in January of this year.            Review of Systems   Constitutional: Negative for chills, diaphoresis and fever.   HENT: Negative for ear pain, sore throat and trouble swallowing.    Eyes: Negative for photophobia and pain.   Respiratory: Negative for shortness of breath, wheezing and stridor.    Cardiovascular: Negative for chest pain and palpitations.   Gastrointestinal: Positive for abdominal pain, nausea and vomiting. Negative for abdominal distention, blood in stool and diarrhea.   Endocrine: Negative for polydipsia and polyphagia.   Genitourinary: Negative for difficulty urinating and flank pain.   Musculoskeletal: Negative for back pain, neck pain and neck stiffness.   Skin: Negative for color change and pallor.   Neurological: Negative for seizures, syncope and speech difficulty.   Psychiatric/Behavioral: Negative for confusion.   All other systems reviewed and are negative.      Past Medical History:   Diagnosis Date   • Abnormal ECG    • Anemia    • Anxiety    • Asthma    • Depression    • Diabetes mellitus (CMS/HCC)    • DVT (deep venous thrombosis) (CMS/HCC)    • Factor 5 Leiden mutation, heterozygous (CMS/HCC)    • Fibroid    • GERD (gastroesophageal reflux disease)    • Gestational diabetes    • Gout    • Hyperlipidemia    • Hypothyroid    • Kidney stone    • Neuropathy    • Ovarian cyst    • PE (pulmonary embolism)    • Polycystic ovary syndrome    • " Preeclampsia    • Rh incompatibility    • Urinary tract infection    • Varicella        Allergies   Allergen Reactions   • Amoxicillin Hives   • Penicillins Hives   • Toradol [Ketorolac Tromethamine] Hives   • Tramadol Swelling       Past Surgical History:   Procedure Laterality Date   • CARDIAC SURGERY      Heart Cath done in    •  SECTION     • CHOLECYSTECTOMY     • COLONOSCOPY         Family History   Problem Relation Age of Onset   • No Known Problems Mother    • No Known Problems Father    • No Known Problems Sister    • No Known Problems Brother    • No Known Problems Son    • No Known Problems Daughter    • No Known Problems Maternal Grandmother    • No Known Problems Maternal Grandfather    • No Known Problems Paternal Grandmother    • No Known Problems Paternal Grandfather    • No Known Problems Cousin    • Rheum arthritis Neg Hx    • Osteoarthritis Neg Hx    • Asthma Neg Hx    • Diabetes Neg Hx    • Heart failure Neg Hx    • Hyperlipidemia Neg Hx    • Hypertension Neg Hx    • Migraines Neg Hx    • Rashes / Skin problems Neg Hx    • Seizures Neg Hx    • Stroke Neg Hx    • Thyroid disease Neg Hx        Social History     Socioeconomic History   • Marital status:      Spouse name: Not on file   • Number of children: Not on file   • Years of education: Not on file   • Highest education level: Not on file   Tobacco Use   • Smoking status: Never Smoker   • Smokeless tobacco: Never Used   Substance and Sexual Activity   • Alcohol use: No   • Drug use: No   • Sexual activity: Defer           Objective   Physical Exam   Constitutional: She is oriented to person, place, and time. She appears well-developed and well-nourished.  Non-toxic appearance.   Appears to be in pain   HENT:   Head: Normocephalic and atraumatic.   Eyes: EOM are normal. Pupils are equal, round, and reactive to light. No scleral icterus.   Neck: Normal range of motion. Neck supple. No neck rigidity. No tracheal deviation  present.   Cardiovascular: Normal rate, regular rhythm and intact distal pulses.   Pulmonary/Chest: Effort normal and breath sounds normal. No respiratory distress. She exhibits no tenderness.   Abdominal: Soft. Bowel sounds are normal. There is tenderness (Moderate tenderness right mid abdomen/right lower quadrant.  No other tenderness.  No acute peritoneal signs.). There is no rebound, no guarding and no CVA tenderness.   Musculoskeletal: Normal range of motion. She exhibits no tenderness.   Neurological: She is alert and oriented to person, place, and time. She has normal strength. No sensory deficit. She exhibits normal muscle tone. Coordination normal. GCS eye subscore is 4. GCS verbal subscore is 5. GCS motor subscore is 6.   Skin: Skin is warm and dry. Capillary refill takes less than 2 seconds. No cyanosis. No pallor.   Psychiatric: She has a normal mood and affect. Her behavior is normal.   Nursing note and vitals reviewed.      Procedures  US Non-ob Transvaginal   Final Result   The uterus appears unremarkable. a right adnexal region mass   measuring 4.7 cm with internal echogenicity suggestive of teratoma.   Intermittent torsion causing pain cannot be completely excluded.       This report was finalized on 4/25/2019 2:54 PM by Dr. Pj Lee MD.          CT Abdomen Pelvis Without Contrast   Final Result   Impression:   Solid probably adnexal mass measuring about 6 cm in the right hemipelvis   that should be further evaluated with ultrasound.       This report was finalized on 4/25/2019 1:01 PM by Dr. Pj Lee MD.            Results for orders placed or performed during the hospital encounter of 04/25/19   Comprehensive Metabolic Panel   Result Value Ref Range    Glucose 257 (H) 65 - 99 mg/dL    BUN 8 6 - 20 mg/dL    Creatinine 0.52 (L) 0.57 - 1.00 mg/dL    Sodium 141 136 - 145 mmol/L    Potassium 4.1 3.5 - 5.2 mmol/L    Chloride 105 98 - 107 mmol/L    CO2 21.7 (L) 22.0 - 29.0 mmol/L    Calcium 9.2  8.6 - 10.5 mg/dL    Total Protein 7.4 6.0 - 8.5 g/dL    Albumin 4.10 3.50 - 5.20 g/dL    ALT (SGPT) 49 (H) 1 - 33 U/L    AST (SGOT) 76 (H) 1 - 32 U/L    Alkaline Phosphatase 79 39 - 117 U/L    Total Bilirubin 0.3 0.2 - 1.2 mg/dL    eGFR Non African Amer 136 >60 mL/min/1.73    Globulin 3.3 gm/dL    A/G Ratio 1.2 g/dL    BUN/Creatinine Ratio 15.4 7.0 - 25.0    Anion Gap 14.3 mmol/L   Lipase   Result Value Ref Range    Lipase 40 13 - 60 U/L   hCG, Serum, Qualitative   Result Value Ref Range    HCG Qualitative Negative Negative   Urinalysis With Culture If Indicated - Urine, Catheter In/Out   Result Value Ref Range    Color, UA Yellow Yellow, Straw    Appearance, UA Clear Clear    pH, UA <=5.0 5.0 - 8.0    Specific Gravity, UA 1.025 1.005 - 1.030    Glucose,  mg/dL (Trace) (A) Negative    Ketones, UA Negative Negative    Bilirubin, UA Negative Negative    Blood, UA Negative Negative    Protein, UA Negative Negative    Leuk Esterase, UA Negative Negative    Nitrite, UA Negative Negative    Urobilinogen, UA 0.2 E.U./dL 0.2 - 1.0 E.U./dL   CBC Auto Differential   Result Value Ref Range    WBC 4.02 3.40 - 10.80 10*3/mm3    RBC 3.78 3.77 - 5.28 10*6/mm3    Hemoglobin 10.0 (L) 12.0 - 15.9 g/dL    Hematocrit 31.6 (L) 34.0 - 46.6 %    MCV 83.6 79.0 - 97.0 fL    MCH 26.5 (L) 26.6 - 33.0 pg    MCHC 31.6 31.5 - 35.7 g/dL    RDW 15.4 12.3 - 15.4 %    RDW-SD 45.4 37.0 - 54.0 fl    MPV 8.7 6.0 - 12.0 fL    Platelets 206 140 - 450 10*3/mm3    Neutrophil % 52.1 42.7 - 76.0 %    Lymphocyte % 35.1 19.6 - 45.3 %    Monocyte % 10.2 5.0 - 12.0 %    Eosinophil % 2.2 0.3 - 6.2 %    Basophil % 0.2 0.0 - 1.5 %    Immature Grans % 0.2 0.0 - 0.5 %    Neutrophils, Absolute 2.09 1.70 - 7.00 10*3/mm3    Lymphocytes, Absolute 1.41 0.70 - 3.10 10*3/mm3    Monocytes, Absolute 0.41 0.10 - 0.90 10*3/mm3    Eosinophils, Absolute 0.09 0.00 - 0.40 10*3/mm3    Basophils, Absolute 0.01 0.00 - 0.20 10*3/mm3    Immature Grans, Absolute 0.01 0.00 - 0.05  10*3/mm3   Urine Drug Screen - Urine, Clean Catch   Result Value Ref Range    Amphetamine Screen, Urine Negative Negative    Barbiturates Screen, Urine Negative Negative    Benzodiazepine Screen, Urine Negative Negative    Cocaine Screen, Urine Negative Negative    Methadone Screen, Urine Negative Negative    Opiate Screen Positive (A) Negative    Phencyclidine (PCP), Urine Negative Negative    THC, Screen, Urine Negative Negative    6-ACETYL MORPHINE Negative Negative    Buprenorphine, Screen, Urine Negative Negative    Oxycodone Screen, Urine Negative Negative   Light Blue Top   Result Value Ref Range    Extra Tube hold for add-on    Green Top (Gel)   Result Value Ref Range    Extra Tube Hold for add-ons.    Lavender Top   Result Value Ref Range    Extra Tube hold for add-on                 ED Course  ED Course as of Apr 25 1827   Thu Apr 25, 2019   1515 Emergency department stay has not been difficult.  I discussed the case with Dr. Sherwood; he is admitting patient to the women's health floor under his service.  [CM]      ED Course User Index  [CM] Esvin Narayanan MD                  [facility]      Final diagnoses:   Ovarian teratoma, right             Please note that portions of this note were completed with a voice recognition program. Efforts were made to edit the dictations, but occasionally words are mistranscribed.       Esvin Narayanan MD  04/25/19 9123

## 2019-04-26 NOTE — PAYOR COMM NOTE
"CONTACT:  FORTINO PACHECO RN, BSN  UTILIZATION MANAGEMENT DEPT.  [facility]  [address]  [place], [address]  PHONE:  [phone]  FAX: [phone]    REQUEST FOR INPATIENT AUTHORIZATION.    Mago Gomez (34 y.o. Female)     Date of Birth Social Security Number Address Home Phone MRN    1985  [address]  [place] [address] [phone] [id number]    Caodaism Marital Status          Buddhist        Admission Date Admission Type Admitting Provider Attending Provider Department, Room/Bed    4/25/19 Emergency Jose Sherwood III, MD  [facility] PEDIATRICS, P247/2S    Discharge Date Discharge Disposition Discharge Destination        4/25/2019 Left Against Medical Advice              Attending Provider:  (none)   Allergies:  Amoxicillin, Penicillins, Toradol [Ketorolac Tromethamine], Tramadol    Isolation:  None   Infection:  None   Code Status:  Prior    Ht:  162.6 cm (64\")   Wt:  150 kg (331 lb)    Admission Cmt:  None   Principal Problem:  None                Active Insurance as of 4/25/2019     Primary Coverage     Payor Plan Insurance Group Employer/Plan Group    [facility]      Payor Plan Address Payor Plan Phone Number Payor Plan Fax Number Effective Dates    [address] [address]   4/1/2016 - None Entered    [place] [address]       Subscriber Name Subscriber Birth Date Member ID       MAGO GOMEZ 1985 [id number]                 Emergency Contacts      (Rel.) Home Phone Work Phone Mobile Phone    Saji Borja (Significant Other) [phone] -- --            Problem List           Codes Noted - Resolved       Hospital    Ovarian teratoma, right ICD-10-CM: D27.0  ICD-9-CM: 220 4/25/2019 - Present    Other chronic pain ICD-10-CM: G89.29  ICD-9-CM: 338.29 4/25/2019 - Present    Pelvic pain ICD-10-CM: R10.2  ICD-9-CM: VIY6899 4/25/2019 - Present    History of DVT in adulthood ICD-10-CM: Z86.718  ICD-9-CM: " "V12.51 4/25/2019 - Present    Morbid obesity with BMI of 50.0-59.9, adult (CMS/[facility]) ICD-10-CM: E66.01, Z68.43  ICD-9-CM: 278.01, V85.43 4/25/2019 - Present    History of pulmonary embolism ICD-10-CM: Z86.711  ICD-9-CM: V12.55 4/25/2019 - Present       Non-Hospital    Chest pain ICD-10-CM: R07.9  ICD-9-CM: 786.50 4/15/2019 - Present    Kidney stone ICD-10-CM: N20.0  ICD-9-CM: 592.0 8/17/2016 - Present          History & Physical     No notes of this type exist for this encounter.           Emergency Department Notes      Domenica Mesa at 4/25/2019 11:44 AM        yolie request #[id number]     Domenica Mesa  04/25/19 1144      Electronically signed by Domenica Mesa at 4/25/2019 11:44 AM     Domenica Mesa at 4/25/2019  1:44 PM        Ultrasound called requesting pt to have a full bladder for test. Dr. Narayanan approved and pt now has water at bedside and encouraged her to drink it as soon as possible for test.     Domenica Mesa  04/25/19 1345      Electronically signed by Domenica Mesa at 4/25/2019  1:45 PM     Esvin Narayanan MD at 4/25/2019  4:19 PM          Subjective   Patient is a 34-year-old female who presents today with a chief complaint of \"I think I have a kidney stone\"... Diffuse right-sided abdominal/flank pain, onset this morning.  She reports that this has been accompanied by nausea and vomiting.  She denies fever, chills, chest pain, shortness of breath, hematemesis, hematochezia or melena.  She does report dysuria.  She denies other symptoms or complaints.  In the past she has been found to have a right adnexal mass that has been felt to be a teratoma; most recently this was documented in January of this year.            Review of Systems   Constitutional: Negative for chills, diaphoresis and fever.   HENT: Negative for ear pain, sore throat and trouble swallowing.    Eyes: Negative for photophobia and pain.   Respiratory: Negative for shortness of breath, wheezing and stridor.    Cardiovascular: " Negative for chest pain and palpitations.   Gastrointestinal: Positive for abdominal pain, nausea and vomiting. Negative for abdominal distention, blood in stool and diarrhea.   Endocrine: Negative for polydipsia and polyphagia.   Genitourinary: Negative for difficulty urinating and flank pain.   Musculoskeletal: Negative for back pain, neck pain and neck stiffness.   Skin: Negative for color change and pallor.   Neurological: Negative for seizures, syncope and speech difficulty.   Psychiatric/Behavioral: Negative for confusion.   All other systems reviewed and are negative.      Past Medical History:   Diagnosis Date   • Abnormal ECG    • Anemia    • Anxiety    • Asthma    • Depression    • Diabetes mellitus (CMS/HCC)    • DVT (deep venous thrombosis) (CMS/[facility])    • Factor 5 Leiden mutation, heterozygous (CMS/[facility])    • Fibroid    • GERD (gastroesophageal reflux disease)    • Gestational diabetes    • Gout    • Hyperlipidemia    • Hypothyroid    • Kidney stone    • Neuropathy    • Ovarian cyst    • PE (pulmonary embolism)    • Polycystic ovary syndrome    • Preeclampsia    • Rh incompatibility    • Urinary tract infection    • Varicella        Allergies   Allergen Reactions   • Amoxicillin Hives   • Penicillins Hives   • Toradol [Ketorolac Tromethamine] Hives   • Tramadol Swelling       Past Surgical History:   Procedure Laterality Date   • CARDIAC SURGERY      Heart Cath done in    •  SECTION     • CHOLECYSTECTOMY     • COLONOSCOPY         Family History   Problem Relation Age of Onset   • No Known Problems Mother    • No Known Problems Father    • No Known Problems Sister    • No Known Problems Brother    • No Known Problems Son    • No Known Problems Daughter    • No Known Problems Maternal Grandmother    • No Known Problems Maternal Grandfather    • No Known Problems Paternal Grandmother    • No Known Problems Paternal Grandfather    • No Known Problems Cousin    • Rheum arthritis Neg Hx    •  Osteoarthritis Neg Hx    • Asthma Neg Hx    • Diabetes Neg Hx    • Heart failure Neg Hx    • Hyperlipidemia Neg Hx    • Hypertension Neg Hx    • Migraines Neg Hx    • Rashes / Skin problems Neg Hx    • Seizures Neg Hx    • Stroke Neg Hx    • Thyroid disease Neg Hx        Social History     Socioeconomic History   • Marital status:      Spouse name: Not on file   • Number of children: Not on file   • Years of education: Not on file   • Highest education level: Not on file   Tobacco Use   • Smoking status: Never Smoker   • Smokeless tobacco: Never Used   Substance and Sexual Activity   • Alcohol use: No   • Drug use: No   • Sexual activity: Defer           Objective   Physical Exam   Constitutional: She is oriented to person, place, and time. She appears well-developed and well-nourished.  Non-toxic appearance.   Appears to be in pain   HENT:   Head: Normocephalic and atraumatic.   Eyes: EOM are normal. Pupils are equal, round, and reactive to light. No scleral icterus.   Neck: Normal range of motion. Neck supple. No neck rigidity. No tracheal deviation present.   Cardiovascular: Normal rate, regular rhythm and intact distal pulses.   Pulmonary/Chest: Effort normal and breath sounds normal. No respiratory distress. She exhibits no tenderness.   Abdominal: Soft. Bowel sounds are normal. There is tenderness (Moderate tenderness right mid abdomen/right lower quadrant.  No other tenderness.  No acute peritoneal signs.). There is no rebound, no guarding and no CVA tenderness.   Musculoskeletal: Normal range of motion. She exhibits no tenderness.   Neurological: She is alert and oriented to person, place, and time. She has normal strength. No sensory deficit. She exhibits normal muscle tone. Coordination normal. GCS eye subscore is 4. GCS verbal subscore is 5. GCS motor subscore is 6.   Skin: Skin is warm and dry. Capillary refill takes less than 2 seconds. No cyanosis. No pallor.   Psychiatric: She has a normal  mood and affect. Her behavior is normal.   Nursing note and vitals reviewed.      Procedures  US Non-ob Transvaginal   Final Result   The uterus appears unremarkable. a right adnexal region mass   measuring 4.7 cm with internal echogenicity suggestive of teratoma.   Intermittent torsion causing pain cannot be completely excluded.       This report was finalized on 4/25/2019 2:54 PM by Dr. Pj Lee MD.          CT Abdomen Pelvis Without Contrast   Final Result   Impression:   Solid probably adnexal mass measuring about 6 cm in the right hemipelvis   that should be further evaluated with ultrasound.       This report was finalized on 4/25/2019 1:01 PM by Dr. Pj Lee MD.            Results for orders placed or performed during the hospital encounter of 04/25/19   Comprehensive Metabolic Panel   Result Value Ref Range    Glucose 257 (H) 65 - 99 mg/dL    BUN 8 6 - 20 mg/dL    Creatinine 0.52 (L) 0.57 - 1.00 mg/dL    Sodium 141 136 - 145 mmol/L    Potassium 4.1 3.5 - 5.2 mmol/L    Chloride 105 98 - 107 mmol/L    CO2 21.7 (L) 22.0 - 29.0 mmol/L    Calcium 9.2 8.6 - 10.5 mg/dL    Total Protein 7.4 6.0 - 8.5 g/dL    Albumin 4.10 3.50 - 5.20 g/dL    ALT (SGPT) 49 (H) 1 - 33 U/L    AST (SGOT) 76 (H) 1 - 32 U/L    Alkaline Phosphatase 79 39 - 117 U/L    Total Bilirubin 0.3 0.2 - 1.2 mg/dL    eGFR Non African Amer 136 >60 mL/min/1.73    Globulin 3.3 gm/dL    A/G Ratio 1.2 g/dL    BUN/Creatinine Ratio 15.4 7.0 - 25.0    Anion Gap 14.3 mmol/L   Lipase   Result Value Ref Range    Lipase 40 13 - 60 U/L   hCG, Serum, Qualitative   Result Value Ref Range    HCG Qualitative Negative Negative   Urinalysis With Culture If Indicated - Urine, Catheter In/Out   Result Value Ref Range    Color, UA Yellow Yellow, Straw    Appearance, UA Clear Clear    pH, UA <=5.0 5.0 - 8.0    Specific Gravity, UA 1.025 1.005 - 1.030    Glucose,  mg/dL (Trace) (A) Negative    Ketones, UA Negative Negative    Bilirubin, UA Negative Negative     Blood, UA Negative Negative    Protein, UA Negative Negative    Leuk Esterase, UA Negative Negative    Nitrite, UA Negative Negative    Urobilinogen, UA 0.2 E.U./dL 0.2 - 1.0 E.U./dL   CBC Auto Differential   Result Value Ref Range    WBC 4.02 3.40 - 10.80 10*3/mm3    RBC 3.78 3.77 - 5.28 10*6/mm3    Hemoglobin 10.0 (L) 12.0 - 15.9 g/dL    Hematocrit 31.6 (L) 34.0 - 46.6 %    MCV 83.6 79.0 - 97.0 fL    MCH 26.5 (L) 26.6 - 33.0 pg    MCHC 31.6 31.5 - 35.7 g/dL    RDW 15.4 12.3 - 15.4 %    RDW-SD 45.4 37.0 - 54.0 fl    MPV 8.7 6.0 - 12.0 fL    Platelets 206 140 - 450 10*3/mm3    Neutrophil % 52.1 42.7 - 76.0 %    Lymphocyte % 35.1 19.6 - 45.3 %    Monocyte % 10.2 5.0 - 12.0 %    Eosinophil % 2.2 0.3 - 6.2 %    Basophil % 0.2 0.0 - 1.5 %    Immature Grans % 0.2 0.0 - 0.5 %    Neutrophils, Absolute 2.09 1.70 - 7.00 10*3/mm3    Lymphocytes, Absolute 1.41 0.70 - 3.10 10*3/mm3    Monocytes, Absolute 0.41 0.10 - 0.90 10*3/mm3    Eosinophils, Absolute 0.09 0.00 - 0.40 10*3/mm3    Basophils, Absolute 0.01 0.00 - 0.20 10*3/mm3    Immature Grans, Absolute 0.01 0.00 - 0.05 10*3/mm3   Urine Drug Screen - Urine, Clean Catch   Result Value Ref Range    Amphetamine Screen, Urine Negative Negative    Barbiturates Screen, Urine Negative Negative    Benzodiazepine Screen, Urine Negative Negative    Cocaine Screen, Urine Negative Negative    Methadone Screen, Urine Negative Negative    Opiate Screen Positive (A) Negative    Phencyclidine (PCP), Urine Negative Negative    THC, Screen, Urine Negative Negative    6-ACETYL MORPHINE Negative Negative    Buprenorphine, Screen, Urine Negative Negative    Oxycodone Screen, Urine Negative Negative   Light Blue Top   Result Value Ref Range    Extra Tube hold for add-on    Green Top (Gel)   Result Value Ref Range    Extra Tube Hold for add-ons.    Lavender Top   Result Value Ref Range    Extra Tube hold for add-on                ED Course  ED Course as of Apr 25 1827   Thu Apr 25, 2019   1519  "Emergency department stay has not been difficult.  I discussed the case with Dr. Sherwood; he is admitting patient to the women's health floor under his service.  [CM]      ED Course User Index  [CM] Esvin Narayanan MD                  [facility]      Final diagnoses:   Ovarian teratoma, right             Please note that portions of this note were completed with a voice recognition program. Efforts were made to edit the dictations, but occasionally words are mistranscribed.       Esvin Narayanan MD  04/25/19 1827      Electronically signed by Esvin Narayanan MD at 4/25/2019  6:27 PM     Chirag Villa, RN at 4/25/2019  4:24 PM        Report given to ROB Domingo Richi, ROB  04/25/19 1624      Electronically signed by Chirag Villa, RN at 4/25/2019  4:24 PM       ICU Vital Signs     Row Name 04/25/19 1645 04/25/19 1502 04/25/19 1402 04/25/19 1332 04/25/19 1302       Height and Weight    Height  162.6 cm (64\")  --  --  --  --    Height Method  Stated  --  --  --  --    Weight  150 kg (331 lb)  (Abnormal)   --  --  --  --    Weight Method  Standing scale  --  --  --  --    Ideal Body Weight (IBW) (kg)  55  --  --  --  --    BSA (Calculated - sq m)  2.42 sq meters  --  --  --  --    BMI (Calculated)  56.8  --  --  --  --    Weight in (lb) to have BMI = 25  145.3  --  --  --  --       Vitals    Temp  98.2 °F (36.8 °C)  --  --  --  --    Temp src  Oral  --  --  --  --    Pulse  96  --  --  --  --    Heart Rate Source  Monitor;Left;Brachial  --  --  --  --    Resp  20  --  --  --  --    Resp Rate Source  Visual  --  --  --  --    BP  140/85  130/65  150/93  131/71  137/83    Noninvasive MAP (mmHg)  --  100  119  89  96    BP Location  Left arm  --  --  --  --    BP Method  Automatic  --  --  --  --       Oxygen Therapy    SpO2  95 %  97 %  97 %  97 %  98 %    Pulse Oximetry Type  Intermittent  --  --  --  --    Device (Oxygen Therapy)  room air  --  --  --  --    Row Name 04/25/19 1116       " "            Height and Weight    Height  162.6 cm (64\")        Height Method  Stated        Weight  143 kg (315 lb)  (Abnormal)         Weight Method  Stated        Ideal Body Weight (IBW) (kg)  55        BSA (Calculated - sq m)  2.37 sq meters        BMI (Calculated)  54        Weight in (lb) to have BMI = 25  145.3           Vitals    Temp  98.4 °F (36.9 °C)        Temp src  Oral        Pulse  93        Heart Rate Source  Monitor        Resp  18        Resp Rate Source  Visual        BP  184/100  (Abnormal)  provider made aware        BP Location  Right arm           Oxygen Therapy    SpO2  98 %        Device (Oxygen Therapy)  room air              Lines, Drains & Airways    Active LDAs     None         Inactive LDAs     Name:   Placement date:   Placement time:   Removal date:   Removal time:   Site:   Days:    [REMOVED] Peripheral IV 04/25/19 1129 Right Antecubital   04/25/19    1129    04/25/19    1818    Antecubital   less than 1                Hospital Medications (all)       Dose Frequency Start End    famotidine (PEPCID) injection 20 mg 20 mg Once 4/25/2019 4/25/2019    Sig - Route: Infuse 2 mL into a venous catheter 1 (One) Time. - Intravenous    morphine injection 4 mg 4 mg Once 4/25/2019 4/25/2019    Sig - Route: Infuse 1 mL into a venous catheter 1 (One) Time. - Intravenous    morphine injection 4 mg 4 mg Once 4/25/2019 4/25/2019    Sig - Route: Infuse 1 mL into a venous catheter 1 (One) Time. - Intravenous    morphine injection 4 mg 4 mg Once 4/25/2019 4/25/2019    Sig - Route: Infuse 1 mL into a venous catheter 1 (One) Time. - Intravenous    ondansetron (ZOFRAN) injection 4 mg 4 mg Once 4/25/2019 4/25/2019    Sig - Route: Infuse 2 mL into a venous catheter 1 (One) Time. - Intravenous    sodium chloride 0.9 % bolus 500 mL 500 mL Once 4/25/2019 4/25/2019    Sig - Route: Infuse 500 mL into a venous catheter 1 (One) Time. - Intravenous    metoclopramide (REGLAN) injection 10 mg (Discontinued) 10 mg Every " 6 Hours PRN 4/25/2019 4/25/2019    Sig - Route: Infuse 2 mL into a venous catheter Every 6 (Six) Hours As Needed for Nausea. - Intravenous    Reason for Discontinue: Patient Discharge    ondansetron (ZOFRAN) injection 4 mg (Discontinued) 4 mg Every 6 Hours PRN 4/25/2019 4/25/2019    Sig - Route: Infuse 2 mL into a venous catheter Every 6 (Six) Hours As Needed for Nausea or Vomiting. - Intravenous    Reason for Discontinue: Patient Discharge    oxyCODONE-acetaminophen (PERCOCET) 5-325 MG per tablet 1 tablet (Discontinued) 1 tablet Every 4 Hours PRN 4/25/2019 4/25/2019    Sig - Route: Take 1 tablet by mouth Every 4 (Four) Hours As Needed for Severe Pain . - Oral    Reason for Discontinue: Patient Discharge    sodium chloride 0.9 % infusion (Discontinued) 125 mL/hr Continuous 4/25/2019 4/25/2019    Sig - Route: Infuse 125 mL/hr into a venous catheter Continuous. - Intravenous    Reason for Discontinue: Patient Discharge    sodium chloride 0.9 % infusion (Discontinued) 75 mL/hr Continuous 4/25/2019 4/25/2019    Sig - Route: Infuse 75 mL/hr into a venous catheter Continuous. - Intravenous    Reason for Discontinue: Patient Discharge          Lab Results (all)     Procedure Component Value Units Date/Time    Urine Drug Screen - Urine, Clean Catch [[id number]]  (Abnormal) Collected:  04/25/19 1131    Specimen:  Urine, Clean Catch Updated:  04/25/19 1753     Amphetamine Screen, Urine Negative     Barbiturates Screen, Urine Negative     Benzodiazepine Screen, Urine Negative     Cocaine Screen, Urine Negative     Methadone Screen, Urine Negative     Opiate Screen Positive     Phencyclidine (PCP), Urine Negative     THC, Screen, Urine Negative     6-ACETYL MORPHINE Negative     Buprenorphine, Screen, Urine Negative     Oxycodone Screen, Urine Negative    Narrative:       Negative Thresholds For Drugs Screened:                  Amphetamines              1000 ng/ml               Barbiturates               200 ng/ml                Benzodiazepines            200 ng/ml              Cocaine                    300 ng/ml              Methadone                  300 ng/ml              Opiates                    300 ng/ml               Phencyclidine               25 ng/ml               THC                         50 ng/ml              6-Acetyl Morphine           10 ng/ml              Buprenorphine                5 ng/ml              Oxycodone                  300 ng/ml    The reference range for all drugs tested is negative. This report includes final unconfirmed qualitative results to be used for medical treatment purposes only. Unconfirmed results must not be used for non-medical purposes such as employment or legal testing. Clinical consideration should be applied to any drug of abuse test, especially when unconfirmed quantitative results are used.      [place] Draw [[id number]] Collected:  04/25/19 1145    Specimen:  Blood Updated:  04/25/19 1246    Narrative:       The following orders were created for panel order [place] Draw.  Procedure                               Abnormality         Status                     ---------                               -----------         ------                     Light Blue Top[[id number]]                                   Final result               Green Top (Gel)[[id number]]                                  Final result               Lavender Top[[id number]]                                     Final result               Gold Top - SST[[id number]]                                                                Please view results for these tests on the individual orders.    Light Blue Top [[id number]] Collected:  04/25/19 1145    Specimen:  Blood Updated:  04/25/19 1246     Extra Tube hold for add-on     Comment: Auto resulted       Green Top (Gel) [[id number]] Collected:  04/25/19 1145    Specimen:  Blood Updated:  04/25/19 1246     Extra Tube Hold for add-ons.     Comment: Auto resulted.       Lavender Top  [[id number]] Collected:  04/25/19 1145    Specimen:  Blood Updated:  04/25/19 1246     Extra Tube hold for add-on     Comment: Auto resulted       Comprehensive Metabolic Panel [[id number]]  (Abnormal) Collected:  04/25/19 1145    Specimen:  Blood Updated:  04/25/19 1212     Glucose 257 mg/dL      BUN 8 mg/dL      Creatinine 0.52 mg/dL      Sodium 141 mmol/L      Potassium 4.1 mmol/L      Chloride 105 mmol/L      CO2 21.7 mmol/L      Calcium 9.2 mg/dL      Total Protein 7.4 g/dL      Albumin 4.10 g/dL      ALT (SGPT) 49 U/L      AST (SGOT) 76 U/L      Alkaline Phosphatase 79 U/L      Total Bilirubin 0.3 mg/dL      eGFR Non African Amer 136 mL/min/1.73      Globulin 3.3 gm/dL      A/G Ratio 1.2 g/dL      BUN/Creatinine Ratio 15.4     Anion Gap 14.3 mmol/L     Narrative:       GFR Normal >60  Chronic Kidney Disease <60  Kidney Failure <15    Lipase [[id number]]  (Normal) Collected:  04/25/19 1145    Specimen:  Blood Updated:  04/25/19 1212     Lipase 40 U/L     hCG, Serum, Qualitative [[id number]]  (Normal) Collected:  04/25/19 1145    Specimen:  Blood Updated:  04/25/19 1210     HCG Qualitative Negative    Urinalysis With Culture If Indicated - Urine, Catheter In/Out [[id number]]  (Abnormal) Collected:  04/25/19 1152    Specimen:  Urine, Catheter In/Out Updated:  04/25/19 1204     Color, UA Yellow     Appearance, UA Clear     pH, UA <=5.0     Specific Gravity, UA 1.025     Glucose,  mg/dL (Trace)     Ketones, UA Negative     Bilirubin, UA Negative     Blood, UA Negative     Protein, UA Negative     Leuk Esterase, UA Negative     Nitrite, UA Negative     Urobilinogen, UA 0.2 E.U./dL    Narrative:       Urine microscopic not indicated.    CBC & Differential [[id number]] Collected:  04/25/19 1145    Specimen:  Blood Updated:  04/25/19 1154    Narrative:       The following orders were created for panel order CBC & Differential.  Procedure                               Abnormality         Status                      ---------                               -----------         ------                     CBC Auto Differential[[id number]]        Abnormal            Final result                 Please view results for these tests on the individual orders.    CBC Auto Differential [[id number]]  (Abnormal) Collected:  04/25/19 1145    Specimen:  Blood Updated:  04/25/19 1154     WBC 4.02 10*3/mm3      RBC 3.78 10*6/mm3      Hemoglobin 10.0 g/dL      Hematocrit 31.6 %      MCV 83.6 fL      MCH 26.5 pg      MCHC 31.6 g/dL      RDW 15.4 %      RDW-SD 45.4 fl      MPV 8.7 fL      Platelets 206 10*3/mm3      Neutrophil % 52.1 %      Lymphocyte % 35.1 %      Monocyte % 10.2 %      Eosinophil % 2.2 %      Basophil % 0.2 %      Immature Grans % 0.2 %      Neutrophils, Absolute 2.09 10*3/mm3      Lymphocytes, Absolute 1.41 10*3/mm3      Monocytes, Absolute 0.41 10*3/mm3      Eosinophils, Absolute 0.09 10*3/mm3      Basophils, Absolute 0.01 10*3/mm3      Immature Grans, Absolute 0.01 10*3/mm3           Imaging Results (all)     Procedure Component Value Units Date/Time    US Non-ob Transvaginal [[id number]] Collected:  04/25/19 1451     Updated:  04/25/19 1459    Narrative:       EXAMINATION: US PELVIS LIMITED-      CLINICAL INDICATION:     right adnexal cyst     TECHNIQUE:  Multiplanar gray scale and Doppler vascular sonographic  imaging of the pelvis was performed.      COMPARISON: NONE      FINDINGS:   The uterus measures 10.1 by 4.4 by 4.4 cm.  The uterus appears unremarkable. a right adnexal region mass measuring  4.7 cm with internal echogenicity suggestive of teratoma..  Left ovary not seen.  No demonstrable blood flow in this region. Intermittent torsion causing  pain cannot be completely excluded.  No free fluid in the pelvis.   Visualized partially filled urinary bladder is unremarkable.    Impression:       The uterus appears unremarkable. a right adnexal region mass  measuring 4.7 cm with internal echogenicity suggestive of  teratoma.  Intermittent torsion causing pain cannot be completely excluded.     This report was finalized on 4/25/2019 2:54 PM by Dr. Pj Lee MD.       CT Abdomen Pelvis Without Contrast [[id number]] Collected:  04/25/19 1300     Updated:  04/25/19 1303    Narrative:          CT ABDOMEN PELVIS WO CONTRAST-      TECHNIQUE: Multiple axial CT images were obtained from lung bases  through pubic symphysis without administration of IV contrast.  Reformatted images in the coronal and/or sagittal plane(s) were  generated from the axial data set to facilitate diagnostic accuracy  and/or surgical planning.  Oral Contrast:NONE.     Radiation dose reduction techniques were utilized per ALARA protocol.  Automated exposure control was initiated through either or [employer] or  [employer] software packages by  protocol.       2778.86 mGy.cm     Clinical information  right flank pain      Comparison  NONE.     Findings  LOWER THORAX: Clear. No effusions.     ABDOMEN:        LIVER: Homogeneous. No focal hepatic mass or ductal dilatation.        GALLBLADDER: CHOLECYSTECTOMY CLIPS.        PANCREAS: Unremarkable. No mass or ductal dilatation.        SPLEEN: Homogeneous. No splenomegaly.        ADRENALS: No mass.        KIDNEYS: NONOBSTRUCTIVE LEFT RENAL CALCULI.        GI TRACT: Non-dilated. No definite wall thickening.        PERITONEUM: No free air. No free fluid or loculated fluid  collections.        MESENTERY: Unremarkable.        LYMPH NODES: No lymphadenopathy.        VASCULATURE: No evidence of aneurysm.        ABDOMINAL WALL: No focal hernia or mass.           OTHER: None.     PELVIS:        BLADDER: No focal mass or significant wall thickening        REPRODUCTIVE: SOLID PROBABLY ADNEXAL MASS MEASURING ABOUT 6 CM IN THE  RIGHT HEMIPELVIS THAT SHOULD BE FURTHER EVALUATED WITH ULTRASOUND.        APPENDIX: Nondistended. No surrounding inflammation.     BONES: No acute bony abnormality.       Impression:        Impression:  Solid probably adnexal mass measuring about 6 cm in the right hemipelvis  that should be further evaluated with ultrasound.     This report was finalized on 4/25/2019 1:01 PM by Dr. Pj Lee MD.             Orders (all)     Start     Ordered    04/26/19 0001  NPO Diet  Diet Effective Midnight,   Status:  Canceled      04/25/19 1757    04/25/19 1845  sodium chloride 0.9 % infusion  Continuous,   Status:  Discontinued      04/25/19 1757 04/25/19 1755  ondansetron (ZOFRAN) injection 4 mg  Every 6 Hours PRN,   Status:  Discontinued      04/25/19 1757    04/25/19 1754  metoclopramide (REGLAN) injection 10 mg  Every 6 Hours PRN,   Status:  Discontinued      04/25/19 1757 04/25/19 1752  oxyCODONE-acetaminophen (PERCOCET) 5-325 MG per tablet 1 tablet  Every 4 Hours PRN,   Status:  Discontinued      04/25/19 1757 04/25/19 1749  Inpatient Hospitalist Consult  Once,   Status:  Canceled     Specialty:  Hospitalist  Provider:  Sandrine Mcintyre MD    04/25/19 1757    04/25/19 1748  Diet Regular; Cardiac, Consistent Carbohydrate  Diet Effective Now,   Status:  Canceled      04/25/19 1757    04/25/19 1717  Urine Drug Screen - Urine, Clean Catch  Once      04/25/19 1716    04/25/19 1525  Inpatient Admission  Once      04/25/19 1524    04/25/19 1524  IP General Consult (Use specialty-specific consult if known)  Once,   Status:  Canceled     Provider:  Jose Sherwood III, MD    04/25/19 1524    04/25/19 1459  US Non-ob Transvaginal  1 Time Imaging      04/25/19 1335    04/25/19 1455  morphine injection 4 mg  Once      04/25/19 1453    04/25/19 1336  US Pelvis Limited  1 Time Imaging,   Status:  Canceled      04/25/19 1335    04/25/19 1312  morphine injection 4 mg  Once      04/25/19 1310    04/25/19 1133  sodium chloride 0.9 % bolus 500 mL  Once      04/25/19 1131    04/25/19 1133  sodium chloride 0.9 % infusion  Continuous,   Status:  Discontinued      04/25/19 1131    04/25/19 1133  famotidine (PEPCID)  injection 20 mg  Once      04/25/19 1131    04/25/19 1133  morphine injection 4 mg  Once      04/25/19 1131    04/25/19 1133  ondansetron (ZOFRAN) injection 4 mg  Once      04/25/19 1131    04/25/19 1131  [place] Draw  Once      04/25/19 1131    04/25/19 1131  CT Abdomen Pelvis Without Contrast  1 Time Imaging     Comments:  NON-CONTRASTED STUDY    If HCG is negative    04/25/19 1131    04/25/19 1131  Light Blue Top  PROCEDURE ONCE      04/25/19 1131    04/25/19 1131  Green Top (Gel)  PROCEDURE ONCE      04/25/19 1131    04/25/19 1131  Lavender Top  PROCEDURE ONCE      04/25/19 1131    04/25/19 1131  Gold Top - SST  PROCEDURE ONCE,   Status:  Canceled      04/25/19 1131    04/25/19 1130  CBC & Differential  Once      04/25/19 1131    04/25/19 1130  Comprehensive Metabolic Panel  Once      04/25/19 1131    04/25/19 1130  Lipase  Once      04/25/19 1131    04/25/19 1130  hCG, Serum, Qualitative  Once      04/25/19 1131    04/25/19 1130  Urinalysis With Culture If Indicated - Urine, Catheter  Once      04/25/19 1131    04/25/19 1130  Straight Cath  Once      04/25/19 1131    04/25/19 1130  CBC Auto Differential  PROCEDURE ONCE      04/25/19 1131    --  Insulin Glargine (BASAGLAR KWIKPEN) 100 UNIT/ML injection pen  Every Morning      04/25/19 1624    --  atorvastatin (LIPITOR) 20 MG tablet  Nightly      04/25/19 1624    --  melatonin 5 MG tablet tablet  Nightly      04/25/19 1624    --  Multiple Vitamins-Minerals (CENTROVITE) tablet  Nightly      04/25/19 1624    --  cholecalciferol (VITAMIN D3) 1000 units tablet  Nightly      04/25/19 1624    --  FLUoxetine (PROzac) 40 MG capsule  Daily      04/25/19 1624    --  warfarin (COUMADIN) 10 MG tablet  4 Times Weekly      04/25/19 1624    --  warfarin (COUMADIN) 4 MG tablet  3 Times Weekly      04/25/19 1624    --  vitamin C (ASCORBIC ACID) 500 MG tablet  Daily      04/25/19 1624          Operative/Procedure Notes (all)     No notes of this type exist for this encounter.         Physician Progress Notes (all)     No notes of this type exist for this encounter.        Discharge Summary     No notes of this type exist for this encounter.

## 2019-05-07 ENCOUNTER — HOSPITAL ENCOUNTER (EMERGENCY)
Facility: HOSPITAL | Age: 34
Discharge: HOME | End: 2019-05-07
Attending: EMERGENCY MEDICINE | Admitting: EMERGENCY MEDICINE

## 2019-05-07 VITALS
TEMPERATURE: 98.3 F | WEIGHT: 293 LBS | RESPIRATION RATE: 18 BRPM | HEIGHT: 64 IN | DIASTOLIC BLOOD PRESSURE: 91 MMHG | HEART RATE: 101 BPM | OXYGEN SATURATION: 96 % | BODY MASS INDEX: 50.02 KG/M2 | SYSTOLIC BLOOD PRESSURE: 159 MMHG

## 2019-05-07 DIAGNOSIS — N23 RENAL COLIC ON RIGHT SIDE: ICD-10-CM

## 2019-05-07 DIAGNOSIS — R10.9 ACUTE RIGHT FLANK PAIN: Primary | ICD-10-CM

## 2019-05-07 LAB
ALBUMIN SERPL-MCNC: 3.99 G/DL (ref 3.5–5.2)
ALBUMIN/GLOB SERPL: 1.2 G/DL
ALP SERPL-CCNC: 74 U/L (ref 39–117)
ALT SERPL W P-5'-P-CCNC: 25 U/L (ref 1–33)
ANION GAP SERPL CALCULATED.3IONS-SCNC: 16 MMOL/L
AST SERPL-CCNC: 24 U/L (ref 1–32)
BASOPHILS # BLD AUTO: 0.01 10*3/MM3 (ref 0–0.2)
BASOPHILS NFR BLD AUTO: 0.2 % (ref 0–1.5)
BILIRUB SERPL-MCNC: 0.2 MG/DL (ref 0.2–1.2)
BUN BLD-MCNC: 12 MG/DL (ref 6–20)
BUN/CREAT SERPL: 21.8 (ref 7–25)
CALCIUM SPEC-SCNC: 9.2 MG/DL (ref 8.6–10.5)
CHLORIDE SERPL-SCNC: 102 MMOL/L (ref 98–107)
CO2 SERPL-SCNC: 18 MMOL/L (ref 22–29)
CREAT BLD-MCNC: 0.55 MG/DL (ref 0.57–1)
DEPRECATED RDW RBC AUTO: 45 FL (ref 37–54)
EOSINOPHIL # BLD AUTO: 0.09 10*3/MM3 (ref 0–0.4)
EOSINOPHIL NFR BLD AUTO: 2.1 % (ref 0.3–6.2)
ERYTHROCYTE [DISTWIDTH] IN BLOOD BY AUTOMATED COUNT: 15.1 % (ref 12.3–15.4)
GFR SERPL CREATININE-BSD FRML MDRD: 127 ML/MIN/1.73
GLOBULIN UR ELPH-MCNC: 3.4 GM/DL
GLUCOSE BLD-MCNC: 342 MG/DL (ref 65–99)
HCT VFR BLD AUTO: 32.3 % (ref 34–46.6)
HGB BLD-MCNC: 10.4 G/DL (ref 12–15.9)
IMM GRANULOCYTES # BLD AUTO: 0.01 10*3/MM3 (ref 0–0.05)
IMM GRANULOCYTES NFR BLD AUTO: 0.2 % (ref 0–0.5)
LYMPHOCYTES # BLD AUTO: 1.4 10*3/MM3 (ref 0.7–3.1)
LYMPHOCYTES NFR BLD AUTO: 33.4 % (ref 19.6–45.3)
MCH RBC QN AUTO: 26.7 PG (ref 26.6–33)
MCHC RBC AUTO-ENTMCNC: 32.2 G/DL (ref 31.5–35.7)
MCV RBC AUTO: 82.8 FL (ref 79–97)
MONOCYTES # BLD AUTO: 0.35 10*3/MM3 (ref 0.1–0.9)
MONOCYTES NFR BLD AUTO: 8.4 % (ref 5–12)
NEUTROPHILS # BLD AUTO: 2.33 10*3/MM3 (ref 1.7–7)
NEUTROPHILS NFR BLD AUTO: 55.7 % (ref 42.7–76)
PLATELET # BLD AUTO: 206 10*3/MM3 (ref 140–450)
PMV BLD AUTO: 9.7 FL (ref 6–12)
POTASSIUM BLD-SCNC: 3.8 MMOL/L (ref 3.5–5.2)
PROT SERPL-MCNC: 7.4 G/DL (ref 6–8.5)
RBC # BLD AUTO: 3.9 10*6/MM3 (ref 3.77–5.28)
SODIUM BLD-SCNC: 136 MMOL/L (ref 136–145)
WBC NRBC COR # BLD: 4.19 10*3/MM3 (ref 3.4–10.8)

## 2019-05-07 PROCEDURE — 85025 COMPLETE CBC W/AUTO DIFF WBC: CPT | Performed by: PHYSICIAN ASSISTANT

## 2019-05-07 PROCEDURE — 80053 COMPREHEN METABOLIC PANEL: CPT | Performed by: PHYSICIAN ASSISTANT

## 2019-05-07 PROCEDURE — 36415 COLL VENOUS BLD VENIPUNCTURE: CPT

## 2019-05-07 PROCEDURE — 99283 EMERGENCY DEPT VISIT LOW MDM: CPT

## 2019-05-07 RX ORDER — PROMETHAZINE HYDROCHLORIDE 25 MG/1
25 TABLET ORAL ONCE
Status: DISCONTINUED | OUTPATIENT
Start: 2019-05-07 | End: 2019-05-07

## 2019-05-07 RX ORDER — OXYCODONE HYDROCHLORIDE AND ACETAMINOPHEN 5; 325 MG/1; MG/1
1 TABLET ORAL ONCE
Status: DISCONTINUED | OUTPATIENT
Start: 2019-05-07 | End: 2019-05-07

## 2019-05-07 NOTE — ED PROVIDER NOTES
Subjective   This is a 34-year-old female who comes in with chief complaint 2-day history of right flank pain described as sharp, stabbing.  Patient also states she has had 2 episodes of nausea and vomiting today.  Patient does have significant history of kidney stones, GERD, factor V Leiden, anemia, hyperlipidemia.  Also reports dysuria.  Denies any fever, chills, body aches.        History provided by:  Patient   used: No    Flank Pain   Pain location:  R flank  Pain quality: sharp and stabbing    Pain radiates to:  Does not radiate  Pain severity:  Moderate  Onset quality:  Sudden  Duration:  2 days  Timing:  Intermittent  Progression:  Worsening  Chronicity:  New  Context: not alcohol use, not awakening from sleep, not eating, not previous surgeries, not recent illness, not recent sexual activity, not recent travel, not retching, not sick contacts and not trauma    Relieved by:  Nothing  Worsened by:  Nothing  Ineffective treatments:  None tried  Associated symptoms: chills, nausea and vomiting    Associated symptoms: no anorexia, no fatigue, no flatus, no hematemesis, no hematochezia and no shortness of breath    Risk factors: no alcohol abuse, no aspirin use, has not had multiple surgeries, no NSAID use, not pregnant and no recent hospitalization        Review of Systems   Constitutional: Positive for chills. Negative for fatigue.   Respiratory: Negative.  Negative for apnea, choking, chest tightness and shortness of breath.    Gastrointestinal: Positive for nausea and vomiting. Negative for anorexia, flatus, hematemesis and hematochezia.   Genitourinary: Positive for flank pain. Negative for dyspareunia.   Musculoskeletal: Negative for arthralgias, back pain, gait problem and joint swelling.   Hematological: Negative.  Negative for adenopathy. Does not bruise/bleed easily.   Psychiatric/Behavioral: Negative.  Negative for agitation and dysphoric mood.   All other systems reviewed and are  negative.      Past Medical History:   Diagnosis Date   • Abnormal ECG    • Anemia    • Anxiety    • Asthma    • Depression    • Diabetes mellitus (CMS/HCC)    • DVT (deep venous thrombosis) (CMS/HCC)    • Factor 5 Leiden mutation, heterozygous (CMS/HCC)    • Fibroid    • GERD (gastroesophageal reflux disease)    • Gestational diabetes    • Gout    • Hyperlipidemia    • Hypothyroid    • Kidney stone    • Neuropathy    • Ovarian cyst    • PE (pulmonary embolism)    • Polycystic ovary syndrome    • Preeclampsia    • Rh incompatibility    • Urinary tract infection    • Varicella        Allergies   Allergen Reactions   • Amoxicillin Hives   • Penicillins Hives   • Toradol [Ketorolac Tromethamine] Hives   • Tramadol Swelling       Past Surgical History:   Procedure Laterality Date   • CARDIAC SURGERY      Heart Cath done in    •  SECTION     • CHOLECYSTECTOMY     • COLONOSCOPY         Family History   Problem Relation Age of Onset   • No Known Problems Mother    • No Known Problems Father    • No Known Problems Sister    • No Known Problems Brother    • No Known Problems Son    • No Known Problems Daughter    • No Known Problems Maternal Grandmother    • No Known Problems Maternal Grandfather    • No Known Problems Paternal Grandmother    • No Known Problems Paternal Grandfather    • No Known Problems Cousin    • Rheum arthritis Neg Hx    • Osteoarthritis Neg Hx    • Asthma Neg Hx    • Diabetes Neg Hx    • Heart failure Neg Hx    • Hyperlipidemia Neg Hx    • Hypertension Neg Hx    • Migraines Neg Hx    • Rashes / Skin problems Neg Hx    • Seizures Neg Hx    • Stroke Neg Hx    • Thyroid disease Neg Hx        Social History     Socioeconomic History   • Marital status:      Spouse name: Not on file   • Number of children: Not on file   • Years of education: Not on file   • Highest education level: Not on file   Tobacco Use   • Smoking status: Never Smoker   • Smokeless tobacco: Never Used   Substance  and Sexual Activity   • Alcohol use: No   • Drug use: No   • Sexual activity: Defer           Objective   Physical Exam   Constitutional: She is oriented to person, place, and time. She appears well-developed and well-nourished.   HENT:   Head: Normocephalic and atraumatic.   Right Ear: External ear normal.   Left Ear: External ear normal.   Nose: Nose normal.   Mouth/Throat: Oropharynx is clear and moist. No oropharyngeal exudate.   Eyes: Conjunctivae and EOM are normal. Pupils are equal, round, and reactive to light. Right eye exhibits no discharge. Left eye exhibits no discharge. No scleral icterus.   Neck: Normal range of motion. Neck supple. No JVD present. No tracheal deviation present. No thyromegaly present.   Cardiovascular: Normal rate, regular rhythm, normal heart sounds and intact distal pulses. Exam reveals no gallop.   Pulmonary/Chest: Effort normal and breath sounds normal. No stridor. No respiratory distress. She has no wheezes. She has no rales.   Abdominal: Soft. Bowel sounds are normal. She exhibits no distension and no mass. There is no tenderness. There is no rebound.   Musculoskeletal: She exhibits tenderness.   Right CVA tenderness    Neurological: She is alert and oriented to person, place, and time. She displays normal reflexes. No cranial nerve deficit. She exhibits normal muscle tone. Coordination normal.   Skin: Skin is warm and dry. Capillary refill takes less than 2 seconds. No rash noted. No erythema. No pallor.   Psychiatric: She has a normal mood and affect. Her behavior is normal. Judgment and thought content normal.   Nursing note and vitals reviewed.      Procedures           ED Course  ED Course as of May 07 1216   Tue May 07, 2019   1215 Patient eloped.   []      ED Course User Index  [] Dayron Loco PA-C                  [facility]      Final diagnoses:   Acute right flank pain   Renal colic on right side            Dayron Loco PA-C  05/07/19 1216

## 2019-05-12 ENCOUNTER — HOSPITAL ENCOUNTER (EMERGENCY)
Facility: HOSPITAL | Age: 34
Discharge: LEFT BEFORE DISCHARGE | End: 2019-05-12
Attending: FAMILY MEDICINE | Admitting: FAMILY MEDICINE

## 2019-05-12 VITALS
WEIGHT: 293 LBS | TEMPERATURE: 98.2 F | HEIGHT: 63 IN | HEART RATE: 97 BPM | RESPIRATION RATE: 16 BRPM | OXYGEN SATURATION: 94 % | BODY MASS INDEX: 51.91 KG/M2 | DIASTOLIC BLOOD PRESSURE: 116 MMHG | SYSTOLIC BLOOD PRESSURE: 194 MMHG

## 2019-05-12 DIAGNOSIS — R10.9 LEFT FLANK PAIN: Primary | ICD-10-CM

## 2019-05-12 PROCEDURE — 99282 EMERGENCY DEPT VISIT SF MDM: CPT

## 2019-05-12 RX ORDER — CLONIDINE HYDROCHLORIDE 0.1 MG/1
0.1 TABLET ORAL ONCE
Status: DISCONTINUED | OUTPATIENT
Start: 2019-05-12 | End: 2019-05-12

## 2019-06-04 ENCOUNTER — HOSPITAL ENCOUNTER (EMERGENCY)
Facility: HOSPITAL | Age: 34
Discharge: HOME | End: 2019-06-04
Attending: EMERGENCY MEDICINE | Admitting: EMERGENCY MEDICINE

## 2019-06-04 VITALS
HEIGHT: 64 IN | BODY MASS INDEX: 50.02 KG/M2 | RESPIRATION RATE: 18 BRPM | SYSTOLIC BLOOD PRESSURE: 152 MMHG | DIASTOLIC BLOOD PRESSURE: 86 MMHG | TEMPERATURE: 98.1 F | HEART RATE: 90 BPM | OXYGEN SATURATION: 99 % | WEIGHT: 293 LBS

## 2019-06-04 DIAGNOSIS — S30.0XXA CONTUSION OF BUTTOCK, INITIAL ENCOUNTER: ICD-10-CM

## 2019-06-04 DIAGNOSIS — M54.32 SCIATICA OF LEFT SIDE: Primary | ICD-10-CM

## 2019-06-04 PROCEDURE — 99283 EMERGENCY DEPT VISIT LOW MDM: CPT

## 2019-06-04 RX ORDER — KETOROLAC TROMETHAMINE 30 MG/ML
60 INJECTION, SOLUTION INTRAMUSCULAR; INTRAVENOUS ONCE
Status: DISCONTINUED | OUTPATIENT
Start: 2019-06-04 | End: 2019-06-04 | Stop reason: HOSPADM

## 2019-06-04 RX ORDER — NABUMETONE 750 MG/1
750 TABLET, FILM COATED ORAL 2 TIMES DAILY PRN
Qty: 20 TABLET | Refills: 0 | Status: SHIPPED | OUTPATIENT
Start: 2019-06-04 | End: 2019-08-30

## 2019-06-04 NOTE — ED PROVIDER NOTES
Subjective   Pt comes in with posterior left leg pain.  Burning in nature.  Onset after she fell 2 days ago.        Lower Extremity Issue   Location:  Buttock and leg  Time since incident:  2 days  Injury: yes    Mechanism of injury: fall    Fall:     Fall occurred:  Walking    Impact surface:  Unable to specify    Point of impact:  Buttocks    Entrapped after fall: no    Buttock location:  L buttock  Leg location:  L upper leg and L lower leg  Pain details:     Quality:  Burning    Severity:  Severe    Onset quality:  Sudden    Duration:  2 days    Timing:  Constant    Progression:  Unchanged  Chronicity:  New  Dislocation: no    Foreign body present:  Unable to specify  Tetanus status:  Unknown  Prior injury to area:  No  Relieved by:  Nothing  Worsened by:  Activity, exercise and bearing weight  Ineffective treatments:  None tried  Associated symptoms: back pain and stiffness    Associated symptoms: no decreased ROM, no fatigue, no fever, no itching, no muscle weakness, no neck pain, no numbness, no swelling and no tingling    Risk factors: obesity    Risk factors: no concern for non-accidental trauma, no frequent fractures, no known bone disorder and no recent illness        Review of Systems   Constitutional: Negative.  Negative for fatigue and fever.   HENT: Negative.    Eyes: Negative.    Respiratory: Negative.    Cardiovascular: Negative.    Gastrointestinal: Negative.    Endocrine: Negative.    Genitourinary: Negative.    Musculoskeletal: Positive for arthralgias, back pain, myalgias and stiffness. Negative for neck pain.   Skin: Negative.  Negative for itching.   Allergic/Immunologic: Negative.    Neurological: Negative.    Hematological: Negative.    Psychiatric/Behavioral: Negative.    All other systems reviewed and are negative.      Past Medical History:   Diagnosis Date   • Abnormal ECG    • Anemia    • Anxiety    • Asthma    • Depression    • Diabetes mellitus (CMS/HCC)    • DVT (deep venous  thrombosis) (CMS/HCC)    • Factor 5 Leiden mutation, heterozygous (CMS/HCC)    • Fibroid    • GERD (gastroesophageal reflux disease)    • Gestational diabetes    • Gout    • Hyperlipidemia    • Hypothyroid    • Kidney stone    • Neuropathy    • Ovarian cyst    • PE (pulmonary embolism)    • Polycystic ovary syndrome    • Preeclampsia    • Rh incompatibility    • Urinary tract infection    • Varicella        Allergies   Allergen Reactions   • Amoxicillin Hives   • Penicillins Hives   • Toradol [Ketorolac Tromethamine] Hives   • Tramadol Swelling       Past Surgical History:   Procedure Laterality Date   • CARDIAC SURGERY      Heart Cath done in    •  SECTION     • CHOLECYSTECTOMY     • COLONOSCOPY         Family History   Problem Relation Age of Onset   • No Known Problems Mother    • No Known Problems Father    • No Known Problems Sister    • No Known Problems Brother    • No Known Problems Son    • No Known Problems Daughter    • No Known Problems Maternal Grandmother    • No Known Problems Maternal Grandfather    • No Known Problems Paternal Grandmother    • No Known Problems Paternal Grandfather    • No Known Problems Cousin    • Rheum arthritis Neg Hx    • Osteoarthritis Neg Hx    • Asthma Neg Hx    • Diabetes Neg Hx    • Heart failure Neg Hx    • Hyperlipidemia Neg Hx    • Hypertension Neg Hx    • Migraines Neg Hx    • Rashes / Skin problems Neg Hx    • Seizures Neg Hx    • Stroke Neg Hx    • Thyroid disease Neg Hx        Social History     Socioeconomic History   • Marital status:      Spouse name: Not on file   • Number of children: Not on file   • Years of education: Not on file   • Highest education level: Not on file   Tobacco Use   • Smoking status: Never Smoker   • Smokeless tobacco: Never Used   Substance and Sexual Activity   • Alcohol use: No   • Drug use: No   • Sexual activity: Defer           Objective   Physical Exam   Constitutional: She is oriented to person, place, and time.  She appears well-developed and well-nourished. No distress.   HENT:   Head: Normocephalic and atraumatic.   Membranes pink and moist   Eyes: Conjunctivae are normal. Right eye exhibits no discharge. Left eye exhibits no discharge. No scleral icterus.   Neck: Normal range of motion. Neck supple.   Cardiovascular: Normal rate, regular rhythm, normal heart sounds and intact distal pulses. Exam reveals no gallop and no friction rub.   No murmur heard.  Pulmonary/Chest: Effort normal and breath sounds normal. No stridor. No respiratory distress. She has no wheezes. She has no rales.   Abdominal: Soft. She exhibits no distension. There is no tenderness.   Musculoskeletal: Normal range of motion. She exhibits no deformity.   Neurological: She is alert and oriented to person, place, and time. She displays normal reflexes. No sensory deficit. She exhibits normal muscle tone.   Skin: Skin is warm and dry. Capillary refill takes less than 2 seconds. She is not diaphoretic. No pallor.   Psychiatric: Her behavior is normal.   Nursing note and vitals reviewed.      Procedures           ED Course      No orders to display     Labs Reviewed - No data to display     Medication List      START taking these medications    nabumetone 750 MG tablet  Commonly known as:  RELAFEN  Take 1 tablet by mouth 2 (Two) Times a Day As Needed for Moderate Pain .        CONTINUE taking these medications    albuterol sulfate  (90 Base) MCG/ACT inhaler  Commonly known as:  PROVENTIL HFA;VENTOLIN HFA;PROAIR HFA     allopurinol 300 MG tablet  Commonly known as:  ZYLOPRIM     atorvastatin 20 MG tablet  Commonly known as:  LIPITOR     buPROPion  MG 12 hr tablet  Commonly known as:  WELLBUTRIN SR     CENTROVITE tablet     cetirizine 10 MG tablet  Commonly known as:  zyrTEC     cholecalciferol 1000 units tablet  Commonly known as:  VITAMIN D3     DULoxetine 60 MG capsule  Commonly known as:  CYMBALTA     fish oil 1000 MG capsule capsule      FLUoxetine 40 MG capsule  Commonly known as:  PROzac     fluticasone 50 MCG/ACT nasal spray  Commonly known as:  FLONASE     folic acid 1 MG tablet  Commonly known as:  FOLVITE     hydrochlorothiazide 25 MG tablet  Commonly known as:  HYDRODIURIL     hydrOXYzine 25 MG tablet  Commonly known as:  ATARAX     Insulin Glargine 100 UNIT/ML injection pen  Commonly known as:  BASAGLAR KWIKPEN     levothyroxine 25 MCG tablet  Commonly known as:  SYNTHROID, LEVOTHROID     meclizine 12.5 MG tablet  Commonly known as:  ANTIVERT     melatonin 5 MG tablet tablet     metFORMIN 500 MG tablet  Commonly known as:  GLUCOPHAGE     metoprolol succinate XL 25 MG 24 hr tablet  Commonly known as:  TOPROL-XL     mometasone-formoterol 200-5 MCG/ACT inhaler  Commonly known as:  DULERA 200     montelukast 10 MG tablet  Commonly known as:  SINGULAIR     omeprazole 20 MG capsule  Commonly known as:  priLOSEC     SUMAtriptan 100 MG tablet  Commonly known as:  IMITREX     tiZANidine 4 MG tablet  Commonly known as:  ZANAFLEX     topiramate 100 MG tablet  Commonly known as:  TOPAMAX     vitamin B-12 500 MCG tablet  Commonly known as:  CYANOCOBALAMIN     vitamin C 500 MG tablet  Commonly known as:  ASCORBIC ACID     * warfarin 10 MG tablet  Commonly known as:  COUMADIN     * warfarin 4 MG tablet  Commonly known as:  COUMADIN         * This list has 2 medication(s) that are the same as other medications   prescribed for you. Read the directions carefully, and ask your doctor or   other care provider to review them with you.                        MDM  Number of Diagnoses or Management Options  Contusion of buttock, initial encounter: new and requires workup  Sciatica of left side: new and requires workup     Amount and/or Complexity of Data Reviewed  Decide to obtain previous medical records or to obtain history from someone other than the patient: yes    Risk of Complications, Morbidity, and/or Mortality  Presenting problems: moderate  Diagnostic  procedures: moderate  Management options: moderate    Patient Progress  Patient progress: stable        Final diagnoses:   Sciatica of left side   Contusion of buttock, initial encounter            Celso Shell MD  06/04/19 0336

## 2019-06-07 ENCOUNTER — HOSPITAL ENCOUNTER (EMERGENCY)
Facility: HOSPITAL | Age: 34
Discharge: HOME | End: 2019-06-07
Attending: EMERGENCY MEDICINE | Admitting: EMERGENCY MEDICINE

## 2019-06-07 ENCOUNTER — HOSPITAL ENCOUNTER (EMERGENCY)
Age: 34
Discharge: HOME | End: 2019-06-07
Payer: COMMERCIAL

## 2019-06-07 ENCOUNTER — APPOINTMENT (OUTPATIENT)
Dept: CT IMAGING | Facility: HOSPITAL | Age: 34
End: 2019-06-07

## 2019-06-07 VITALS
OXYGEN SATURATION: 96 % | DIASTOLIC BLOOD PRESSURE: 123 MMHG | RESPIRATION RATE: 18 BRPM | HEART RATE: 101 BPM | SYSTOLIC BLOOD PRESSURE: 195 MMHG | TEMPERATURE: 98.06 F

## 2019-06-07 VITALS
DIASTOLIC BLOOD PRESSURE: 87 MMHG | OXYGEN SATURATION: 94 % | TEMPERATURE: 98.7 F | HEART RATE: 97 BPM | SYSTOLIC BLOOD PRESSURE: 156 MMHG | RESPIRATION RATE: 20 BRPM

## 2019-06-07 VITALS — SYSTOLIC BLOOD PRESSURE: 189 MMHG | HEART RATE: 101 BPM | DIASTOLIC BLOOD PRESSURE: 121 MMHG | OXYGEN SATURATION: 93 %

## 2019-06-07 VITALS
DIASTOLIC BLOOD PRESSURE: 105 MMHG | HEART RATE: 90 BPM | OXYGEN SATURATION: 96 % | RESPIRATION RATE: 19 BRPM | TEMPERATURE: 98 F | BODY MASS INDEX: 50.02 KG/M2 | SYSTOLIC BLOOD PRESSURE: 199 MMHG | HEIGHT: 64 IN | WEIGHT: 293 LBS

## 2019-06-07 VITALS — HEART RATE: 98 BPM | SYSTOLIC BLOOD PRESSURE: 164 MMHG | DIASTOLIC BLOOD PRESSURE: 99 MMHG | OXYGEN SATURATION: 95 %

## 2019-06-07 VITALS — BODY MASS INDEX: 54.1 KG/M2

## 2019-06-07 DIAGNOSIS — Z88.0: ICD-10-CM

## 2019-06-07 DIAGNOSIS — N30.01: Primary | ICD-10-CM

## 2019-06-07 DIAGNOSIS — Z79.84: ICD-10-CM

## 2019-06-07 DIAGNOSIS — N23 RENAL COLIC: Primary | ICD-10-CM

## 2019-06-07 DIAGNOSIS — E11.9: ICD-10-CM

## 2019-06-07 LAB
ALBUMIN SERPL-MCNC: 4.1 G/DL (ref 3.5–5)
ALBUMIN/GLOB SERPL: 1.2 G/DL (ref 1–2)
ALP SERPL-CCNC: 69 U/L (ref 38–126)
ALT SERPL W P-5'-P-CCNC: 50 U/L (ref 13–69)
ANION GAP SERPL CALC-SCNC: 19.2 MEQ/L (ref 5–15)
ANION GAP SERPL CALCULATED.3IONS-SCNC: 16.3 MMOL/L (ref 10–20)
AST SERPL-CCNC: 67 U/L (ref 15–46)
B-HCG UR QL: NEGATIVE
BACTERIA UR QL AUTO: ABNORMAL /HPF
BASOPHILS # BLD AUTO: 0.03 10*3/MM3 (ref 0–0.2)
BASOPHILS NFR BLD AUTO: 0.7 % (ref 0–1.5)
BILIRUB SERPL-MCNC: 0.6 MG/DL (ref 0.2–1.3)
BILIRUB UR QL STRIP: NEGATIVE
BUN BLD-MCNC: 14 MG/DL (ref 7–20)
BUN SERPL-MCNC: 15 MG/DL (ref 7–18)
BUN/CREAT SERPL: 35 (ref 7.1–23.5)
CALCIUM SPEC-MCNC: 8.5 MG/DL (ref 8.5–10.1)
CALCIUM SPEC-SCNC: 8.5 MG/DL (ref 8.4–10.2)
CHLORIDE SERPL-SCNC: 99 MMOL/L (ref 98–107)
CHLORIDE SPEC-SCNC: 103 MMOL/L (ref 98–107)
CLARITY UR: ABNORMAL
CO2 SERPL-SCNC: 22 MMOL/L (ref 21–32)
CO2 SERPL-SCNC: 24 MMOL/L (ref 26–30)
COLOR UR: (no result)
COLOR UR: ABNORMAL
CREAT BLD-MCNC: 0.4 MG/DL (ref 0.6–1.3)
CREAT BLD-SCNC: 0.69 MG/DL (ref 0.55–1.02)
CREATININE CLEARANCE ESTIMATED: 100 ML/MIN (ref 50–200)
DEPRECATED RDW RBC AUTO: 45.8 FL (ref 37–54)
EOSINOPHIL # BLD AUTO: 0.14 10*3/MM3 (ref 0–0.4)
EOSINOPHIL NFR BLD AUTO: 3.4 % (ref 0.3–6.2)
ERYTHROCYTE [DISTWIDTH] IN BLOOD BY AUTOMATED COUNT: 15.4 % (ref 12.3–15.4)
ESTIMATED GLOMERULAR FILT RATE: 98 ML/MIN (ref 60–?)
GFR (AFRICAN AMERICAN): 119 ML/MIN (ref 60–?)
GFR SERPL CREATININE-BSD FRML MDRD: >150 ML/MIN/1.73
GLOBULIN UR ELPH-MCNC: 3.4 GM/DL
GLUCOSE BLD-MCNC: 297 MG/DL (ref 74–98)
GLUCOSE UR STRIP-MCNC: NEGATIVE MG/DL
GLUCOSE: 234 MG/DL (ref 74–106)
HCT VFR BLD AUTO: 30.3 % (ref 34–46.6)
HCT VFR BLD CALC: 34.2 % (ref 37–47)
HGB BLD-MCNC: 10.4 G/DL (ref 12.2–16.2)
HGB BLD-MCNC: 9.5 G/DL (ref 12–15.9)
HGB UR QL STRIP.AUTO: ABNORMAL
HYALINE CASTS UR QL AUTO: ABNORMAL /LPF
IMM GRANULOCYTES # BLD AUTO: 0.01 10*3/MM3 (ref 0–0.05)
IMM GRANULOCYTES NFR BLD AUTO: 0.2 % (ref 0–0.5)
INR PPP: 1.1 (ref 0.9–1.1)
KETONES UR QL STRIP: NEGATIVE
LEUKOCYTE ESTERASE UR QL STRIP.AUTO: NEGATIVE
LEUKOCYTE ESTERASE UR QL STRIP: (no result)
LIPASE SERPL-CCNC: 131 U/L (ref 23–300)
LYMPHOCYTES # BLD AUTO: 1.28 10*3/MM3 (ref 0.7–3.1)
LYMPHOCYTES NFR BLD AUTO: 31.4 % (ref 19.6–45.3)
MCH RBC QN AUTO: 25.6 PG (ref 26.6–33)
MCHC RBC AUTO-ENTMCNC: 31.4 G/DL (ref 31.5–35.7)
MCHC RBC-ENTMCNC: 30.4 G/DL (ref 31.8–35.4)
MCV RBC AUTO: 81.7 FL (ref 79–97)
MCV RBC: 82.1 FL (ref 81–99)
MEAN CORPUSCULAR HEMOGLOBIN: 24.9 PG (ref 27–31.2)
MICRO URNS: (no result)
MONOCYTES # BLD AUTO: 0.41 10*3/MM3 (ref 0.1–0.9)
MONOCYTES NFR BLD AUTO: 10.1 % (ref 5–12)
NEUTROPHILS # BLD AUTO: 2.2 10*3/MM3 (ref 1.7–7)
NEUTROPHILS NFR BLD AUTO: 54.2 % (ref 42.7–76)
NITRITE UR QL STRIP: NEGATIVE
NRBC BLD AUTO-RTO: 0 /100 WBC (ref 0–0.2)
PH UR STRIP.AUTO: 5.5 [PH] (ref 5–8)
PH UR: 6.5 [PH] (ref 5–8.5)
PLATELET # BLD AUTO: 212 10*3/MM3 (ref 140–450)
PLATELET # BLD: 316 K/MM3 (ref 142–424)
PMV BLD AUTO: 9.4 FL (ref 6–12)
POTASSIUM BLD-SCNC: 4.3 MMOL/L (ref 3.5–5.1)
POTASSIUM: 4.2 MMOL/L (ref 3.5–5.1)
PROT SERPL-MCNC: 7.5 G/DL (ref 6.3–8.2)
PROT UR QL STRIP: ABNORMAL
PROT UR STRIP-MCNC: (no result) MG/DL
PROTHROMBIN TIME: 14.6 SECONDS (ref 12–15.1)
RBC # BLD AUTO: 3.71 10*6/MM3 (ref 3.77–5.28)
RBC # BLD AUTO: 4.17 M/MM3 (ref 4.2–5.4)
RBC # UR: ABNORMAL /HPF
RBC #/AREA URNS HPF: (no result) #/HPF (ref 0–3)
REF LAB TEST METHOD: ABNORMAL
SODIUM BLD-SCNC: 135 MMOL/L (ref 137–145)
SODIUM SPEC-SCNC: 140 MMOL/L (ref 136–145)
SP GR UR STRIP: 1.03 (ref 1–1.03)
SP GR UR: 1.01 (ref 1–1.03)
SQUAMOUS #/AREA URNS HPF: ABNORMAL /HPF
TRANS CELLS URNS QL MICRO: (no result) #/LPF (ref 0–3)
UROBILINOGEN UR QL STRIP: ABNORMAL
UROBILINOGEN UR QL: 0.2 EU/DL
WBC # BLD AUTO: 5.3 K/MM3 (ref 4.8–10.8)
WBC NRBC COR # BLD: 4.07 10*3/MM3 (ref 3.4–10.8)
WBC UR QL AUTO: ABNORMAL /HPF

## 2019-06-07 PROCEDURE — 96375 TX/PRO/DX INJ NEW DRUG ADDON: CPT

## 2019-06-07 PROCEDURE — 80053 COMPREHEN METABOLIC PANEL: CPT | Performed by: EMERGENCY MEDICINE

## 2019-06-07 PROCEDURE — 85025 COMPLETE CBC W/AUTO DIFF WBC: CPT

## 2019-06-07 PROCEDURE — 25010000002 MORPHINE PER 10 MG: Performed by: EMERGENCY MEDICINE

## 2019-06-07 PROCEDURE — 25010000002 ONDANSETRON PER 1 MG: Performed by: EMERGENCY MEDICINE

## 2019-06-07 PROCEDURE — 83690 ASSAY OF LIPASE: CPT | Performed by: EMERGENCY MEDICINE

## 2019-06-07 PROCEDURE — 74176 CT ABD & PELVIS W/O CONTRAST: CPT

## 2019-06-07 PROCEDURE — 96374 THER/PROPH/DIAG INJ IV PUSH: CPT

## 2019-06-07 PROCEDURE — 99283 EMERGENCY DEPT VISIT LOW MDM: CPT

## 2019-06-07 PROCEDURE — 81025 URINE PREGNANCY TEST: CPT

## 2019-06-07 PROCEDURE — 80048 BASIC METABOLIC PNL TOTAL CA: CPT

## 2019-06-07 PROCEDURE — 85025 COMPLETE CBC W/AUTO DIFF WBC: CPT | Performed by: EMERGENCY MEDICINE

## 2019-06-07 PROCEDURE — 25010000002 HALOPERIDOL LACTATE PER 5 MG: Performed by: EMERGENCY MEDICINE

## 2019-06-07 PROCEDURE — 87086 URINE CULTURE/COLONY COUNT: CPT | Performed by: EMERGENCY MEDICINE

## 2019-06-07 PROCEDURE — 81025 URINE PREGNANCY TEST: CPT | Performed by: EMERGENCY MEDICINE

## 2019-06-07 PROCEDURE — 96376 TX/PRO/DX INJ SAME DRUG ADON: CPT

## 2019-06-07 PROCEDURE — 96367 TX/PROPH/DG ADDL SEQ IV INF: CPT

## 2019-06-07 PROCEDURE — 85610 PROTHROMBIN TIME: CPT | Performed by: EMERGENCY MEDICINE

## 2019-06-07 PROCEDURE — 81001 URINALYSIS AUTO W/SCOPE: CPT | Performed by: EMERGENCY MEDICINE

## 2019-06-07 PROCEDURE — 81001 URINALYSIS AUTO W/SCOPE: CPT

## 2019-06-07 RX ORDER — MORPHINE SULFATE 4 MG/ML
4 INJECTION, SOLUTION INTRAMUSCULAR; INTRAVENOUS ONCE
Status: COMPLETED | OUTPATIENT
Start: 2019-06-07 | End: 2019-06-07

## 2019-06-07 RX ORDER — HYDROCODONE BITARTRATE AND ACETAMINOPHEN 5; 325 MG/1; MG/1
1 TABLET ORAL ONCE
Status: COMPLETED | OUTPATIENT
Start: 2019-06-07 | End: 2019-06-07

## 2019-06-07 RX ORDER — HALOPERIDOL 5 MG/ML
2 INJECTION INTRAMUSCULAR ONCE
Status: COMPLETED | OUTPATIENT
Start: 2019-06-07 | End: 2019-06-07

## 2019-06-07 RX ORDER — ONDANSETRON 4 MG/1
4 TABLET, ORALLY DISINTEGRATING ORAL EVERY 8 HOURS PRN
Qty: 12 TABLET | Refills: 0 | Status: SHIPPED | OUTPATIENT
Start: 2019-06-07 | End: 2019-06-18 | Stop reason: SDUPTHER

## 2019-06-07 RX ORDER — HYDROCODONE BITARTRATE AND ACETAMINOPHEN 5; 325 MG/1; MG/1
1 TABLET ORAL EVERY 6 HOURS PRN
Qty: 10 TABLET | Refills: 0 | OUTPATIENT
Start: 2019-06-07 | End: 2019-06-18

## 2019-06-07 RX ORDER — ONDANSETRON 2 MG/ML
4 INJECTION INTRAMUSCULAR; INTRAVENOUS ONCE
Status: COMPLETED | OUTPATIENT
Start: 2019-06-07 | End: 2019-06-07

## 2019-06-07 RX ADMIN — MORPHINE SULFATE 4 MG: 4 INJECTION INTRAVENOUS at 21:08

## 2019-06-07 RX ADMIN — LIDOCAINE HYDROCHLORIDE 150 MG: 10 INJECTION, SOLUTION INFILTRATION; PERINEURAL at 21:25

## 2019-06-07 RX ADMIN — MORPHINE SULFATE 4 MG: 4 INJECTION INTRAVENOUS at 22:22

## 2019-06-07 RX ADMIN — SODIUM CHLORIDE 1000 ML: 9 INJECTION, SOLUTION INTRAVENOUS at 21:08

## 2019-06-07 RX ADMIN — ONDANSETRON 4 MG: 2 INJECTION INTRAMUSCULAR; INTRAVENOUS at 21:08

## 2019-06-07 RX ADMIN — HYDROCODONE BITARTRATE AND ACETAMINOPHEN 1 TABLET: 5; 325 TABLET ORAL at 23:10

## 2019-06-07 RX ADMIN — HALOPERIDOL LACTATE 2 MG: 5 INJECTION, SOLUTION INTRAMUSCULAR at 22:23

## 2019-06-08 NOTE — ED PROVIDER NOTES
TRIAGE CHIEF COMPLAINT:     Nursing and triage notes reviewed    Chief Complaint   Patient presents with   • Flank Pain      HPI: Natalia Arzate is a 34 y.o. female who presents to the emergency department complaining of left-sided flank pain.  Symptoms began 2 days previously patient describes sharp stabbing pain that radiates around to her left groin.  Patient has a history of kidney stones and states this feels similar.  Patient states pain was intermittent however today has been more constant.  Has had nausea and vomiting.  There is hematuria.  Denies fevers or chills or dysuria.    REVIEW OF SYSTEMS: All other systems reviewed and are negative     PAST MEDICAL HISTORY:   Past Medical History:   Diagnosis Date   • Abnormal ECG    • Anemia    • Anxiety    • Asthma    • Depression    • Diabetes mellitus (CMS/HCC)    • DVT (deep venous thrombosis) (CMS/HCC)    • Factor 5 Leiden mutation, heterozygous (CMS/HCC)    • Fibroid    • GERD (gastroesophageal reflux disease)    • Gestational diabetes    • Gout    • Hyperlipidemia    • Hypothyroid    • Kidney stone    • Neuropathy    • Ovarian cyst    • PE (pulmonary embolism)    • Polycystic ovary syndrome    • Preeclampsia    • Rh incompatibility    • Urinary tract infection    • Varicella         FAMILY HISTORY:   Family History   Problem Relation Age of Onset   • No Known Problems Mother    • No Known Problems Father    • No Known Problems Sister    • No Known Problems Brother    • No Known Problems Son    • No Known Problems Daughter    • No Known Problems Maternal Grandmother    • No Known Problems Maternal Grandfather    • No Known Problems Paternal Grandmother    • No Known Problems Paternal Grandfather    • No Known Problems Cousin    • Rheum arthritis Neg Hx    • Osteoarthritis Neg Hx    • Asthma Neg Hx    • Diabetes Neg Hx    • Heart failure Neg Hx    • Hyperlipidemia Neg Hx    • Hypertension Neg Hx    • Migraines Neg Hx    • Rashes / Skin problems Neg Hx    •  Seizures Neg Hx    • Stroke Neg Hx    • Thyroid disease Neg Hx         SOCIAL HISTORY:   Social History     Socioeconomic History   • Marital status:      Spouse name: Not on file   • Number of children: Not on file   • Years of education: Not on file   • Highest education level: Not on file   Tobacco Use   • Smoking status: Never Smoker   • Smokeless tobacco: Never Used   Substance and Sexual Activity   • Alcohol use: No   • Drug use: No   • Sexual activity: Defer        SURGICAL HISTORY:   Past Surgical History:   Procedure Laterality Date   • CARDIAC SURGERY      Heart Cath done in    •  SECTION     • CHOLECYSTECTOMY     • COLONOSCOPY          CURRENT MEDICATIONS:      Medication List      ASK your doctor about these medications    albuterol sulfate  (90 Base) MCG/ACT inhaler  Commonly known as:  PROVENTIL HFA;VENTOLIN HFA;PROAIR HFA     allopurinol 300 MG tablet  Commonly known as:  ZYLOPRIM     atorvastatin 20 MG tablet  Commonly known as:  LIPITOR     buPROPion  MG 12 hr tablet  Commonly known as:  WELLBUTRIN SR     CENTROVITE tablet     cetirizine 10 MG tablet  Commonly known as:  zyrTEC     cholecalciferol 1000 units tablet  Commonly known as:  VITAMIN D3     DULoxetine 60 MG capsule  Commonly known as:  CYMBALTA     fish oil 1000 MG capsule capsule     FLUoxetine 40 MG capsule  Commonly known as:  PROzac     fluticasone 50 MCG/ACT nasal spray  Commonly known as:  FLONASE     folic acid 1 MG tablet  Commonly known as:  FOLVITE     hydrochlorothiazide 25 MG tablet  Commonly known as:  HYDRODIURIL     hydrOXYzine 25 MG tablet  Commonly known as:  ATARAX     Insulin Glargine 100 UNIT/ML injection pen  Commonly known as:  BASAGLAR KWIKPEN     levothyroxine 25 MCG tablet  Commonly known as:  SYNTHROID, LEVOTHROID     meclizine 12.5 MG tablet  Commonly known as:  ANTIVERT     melatonin 5 MG tablet tablet     metFORMIN 500 MG tablet  Commonly known as:  GLUCOPHAGE     metoprolol  succinate XL 25 MG 24 hr tablet  Commonly known as:  TOPROL-XL     mometasone-formoterol 200-5 MCG/ACT inhaler  Commonly known as:  DULERA 200     montelukast 10 MG tablet  Commonly known as:  SINGULAIR     nabumetone 750 MG tablet  Commonly known as:  RELAFEN  Take 1 tablet by mouth 2 (Two) Times a Day As Needed for Moderate Pain .     omeprazole 20 MG capsule  Commonly known as:  priLOSEC     SUMAtriptan 100 MG tablet  Commonly known as:  IMITREX     tiZANidine 4 MG tablet  Commonly known as:  ZANAFLEX     topiramate 100 MG tablet  Commonly known as:  TOPAMAX     vitamin B-12 500 MCG tablet  Commonly known as:  CYANOCOBALAMIN     vitamin C 500 MG tablet  Commonly known as:  ASCORBIC ACID     * warfarin 10 MG tablet  Commonly known as:  COUMADIN     * warfarin 4 MG tablet  Commonly known as:  COUMADIN         * This list has 2 medication(s) that are the same as other medications   prescribed for you. Read the directions carefully, and ask your doctor or   other care provider to review them with you.               ALLERGIES: Amoxicillin; Penicillins; Toradol [ketorolac tromethamine]; and Tramadol     PHYSICAL EXAM:   VITAL SIGNS:   Vitals:    06/07/19 2029   BP: 180/100   Pulse: 101   Resp: 20   Temp: 98 °F (36.7 °C)   SpO2: 94%      CONSTITUTIONAL: Awake, oriented, appears non-toxic but uncomfortable  HENT: Atraumatic, normocephalic, oral mucosa pink and moist, airway patent.  EYES: Conjunctiva clear   NECK: Trachea midline, non-tender, supple   CARDIOVASCULAR: Normal heart rate, Normal rhythm, No murmurs, rubs, gallops   PULMONARY/CHEST: Clear to auscultation, no rhonchi, wheezes, or rales. Symmetrical breath sounds.  ABDOMINAL: Non-distended, soft, left flank tenderness to palpation- no rebound or guarding   NEUROLOGIC: Non-focal, moving all four extremities, no gross sensory or motor deficits.   EXTREMITIES: No clubbing, cyanosis, or edema   SKIN: Warm, Dry, No erythema, No rash     ED COURSE / MEDICAL DECISION  MAKING:   Natalia Arzate is a 34 y.o. female who presents to the emergency department for evaluation of left flank pain.  Patient appears mildly uncomfortable on arrival but is nontoxic appearing vital signs are stable on arrival.  Exam does reveal left-sided flank tenderness to palpation.  Given patient's history there is significant concern for kidney stone.  Will obtain a CT scan as well as labs and urinalysis for further evaluation.  Patient was treated with pain and nausea medicine on arrival in the emergency department.  She did have improvement in symptoms.  Patient had a large amount of blood in her urine but labs were otherwise unremarkable.  CT scan did not reveal an obvious ureteral stone but did show a nonobstructing renal stone.  There is possible small distal ureteral stone.  With patient's symptoms and hematuria I suspect likely ureteral colic.  We will continue to treat her symptomatically with outpatient urology follow-up.    DECISION TO DISCHARGE/ADMIT: see ED care timeline     FINAL IMPRESSION:   1 --renal colic  2 --   3 --     Electronically signed by: Kendal Andre MD, 6/7/2019 8:43 PM       Kendal Andre MD  06/07/19 5366

## 2019-06-09 LAB — BACTERIA SPEC AEROBE CULT: NORMAL

## 2019-06-11 ENCOUNTER — HOSPITAL ENCOUNTER (EMERGENCY)
Facility: HOSPITAL | Age: 34
Discharge: HOME | End: 2019-06-11
Attending: EMERGENCY MEDICINE | Admitting: EMERGENCY MEDICINE

## 2019-06-11 VITALS
OXYGEN SATURATION: 95 % | RESPIRATION RATE: 16 BRPM | WEIGHT: 293 LBS | HEART RATE: 112 BPM | TEMPERATURE: 98.8 F | SYSTOLIC BLOOD PRESSURE: 171 MMHG | BODY MASS INDEX: 50.02 KG/M2 | HEIGHT: 64 IN | DIASTOLIC BLOOD PRESSURE: 101 MMHG

## 2019-06-11 DIAGNOSIS — R30.0 DYSURIA: Primary | ICD-10-CM

## 2019-06-11 PROCEDURE — 99282 EMERGENCY DEPT VISIT SF MDM: CPT

## 2019-06-11 PROCEDURE — 99281 EMR DPT VST MAYX REQ PHY/QHP: CPT

## 2019-06-11 RX ORDER — SODIUM CHLORIDE 0.9 % (FLUSH) 0.9 %
10 SYRINGE (ML) INJECTION AS NEEDED
Status: DISCONTINUED | OUTPATIENT
Start: 2019-06-11 | End: 2019-06-11 | Stop reason: HOSPADM

## 2019-06-16 ENCOUNTER — HOSPITAL ENCOUNTER (EMERGENCY)
Facility: HOSPITAL | Age: 34
Discharge: HOME | End: 2019-06-16
Attending: EMERGENCY MEDICINE | Admitting: EMERGENCY MEDICINE

## 2019-06-16 ENCOUNTER — APPOINTMENT (OUTPATIENT)
Dept: ULTRASOUND IMAGING | Facility: HOSPITAL | Age: 34
End: 2019-06-16

## 2019-06-16 VITALS
BODY MASS INDEX: 50.02 KG/M2 | TEMPERATURE: 98 F | DIASTOLIC BLOOD PRESSURE: 80 MMHG | HEIGHT: 64 IN | OXYGEN SATURATION: 98 % | RESPIRATION RATE: 18 BRPM | WEIGHT: 293 LBS | HEART RATE: 90 BPM | SYSTOLIC BLOOD PRESSURE: 128 MMHG

## 2019-06-16 DIAGNOSIS — M54.50 RIGHT-SIDED LOW BACK PAIN WITHOUT SCIATICA, UNSPECIFIED CHRONICITY: ICD-10-CM

## 2019-06-16 DIAGNOSIS — M79.604 RIGHT LEG PAIN: Primary | ICD-10-CM

## 2019-06-16 LAB
ALBUMIN SERPL-MCNC: 3.9 G/DL (ref 3.5–5.2)
ALBUMIN/GLOB SERPL: 1.1 G/DL
ALP SERPL-CCNC: 80 U/L (ref 39–117)
ALT SERPL W P-5'-P-CCNC: 59 U/L (ref 1–33)
ANION GAP SERPL CALCULATED.3IONS-SCNC: 17.2 MMOL/L
AST SERPL-CCNC: 118 U/L (ref 1–32)
BASOPHILS # BLD AUTO: 0.02 10*3/MM3 (ref 0–0.2)
BASOPHILS NFR BLD AUTO: 0.4 % (ref 0–1.5)
BILIRUB SERPL-MCNC: 0.3 MG/DL (ref 0.2–1.2)
BILIRUB UR QL STRIP: NEGATIVE
BUN BLD-MCNC: 11 MG/DL (ref 6–20)
BUN/CREAT SERPL: 18 (ref 7–25)
CALCIUM SPEC-SCNC: 9.7 MG/DL (ref 8.6–10.5)
CHLORIDE SERPL-SCNC: 97 MMOL/L (ref 98–107)
CLARITY UR: CLEAR
CO2 SERPL-SCNC: 20.8 MMOL/L (ref 22–29)
COLOR UR: YELLOW
CREAT BLD-MCNC: 0.61 MG/DL (ref 0.57–1)
DEPRECATED RDW RBC AUTO: 46.1 FL (ref 37–54)
EOSINOPHIL # BLD AUTO: 0.08 10*3/MM3 (ref 0–0.4)
EOSINOPHIL NFR BLD AUTO: 1.5 % (ref 0.3–6.2)
ERYTHROCYTE [DISTWIDTH] IN BLOOD BY AUTOMATED COUNT: 15.9 % (ref 12.3–15.4)
GFR SERPL CREATININE-BSD FRML MDRD: 113 ML/MIN/1.73
GLOBULIN UR ELPH-MCNC: 3.4 GM/DL
GLUCOSE BLD-MCNC: 286 MG/DL (ref 65–99)
GLUCOSE UR STRIP-MCNC: NEGATIVE MG/DL
HCT VFR BLD AUTO: 30.7 % (ref 34–46.6)
HGB BLD-MCNC: 9.3 G/DL (ref 12–15.9)
HGB UR QL STRIP.AUTO: NEGATIVE
IMM GRANULOCYTES # BLD AUTO: 0.02 10*3/MM3 (ref 0–0.05)
IMM GRANULOCYTES NFR BLD AUTO: 0.4 % (ref 0–0.5)
INR PPP: 0.93 (ref 0.9–1.1)
KETONES UR QL STRIP: ABNORMAL
LEUKOCYTE ESTERASE UR QL STRIP.AUTO: NEGATIVE
LYMPHOCYTES # BLD AUTO: 1 10*3/MM3 (ref 0.7–3.1)
LYMPHOCYTES NFR BLD AUTO: 19 % (ref 19.6–45.3)
MCH RBC QN AUTO: 25.2 PG (ref 26.6–33)
MCHC RBC AUTO-ENTMCNC: 30.3 G/DL (ref 31.5–35.7)
MCV RBC AUTO: 83.2 FL (ref 79–97)
MONOCYTES # BLD AUTO: 0.36 10*3/MM3 (ref 0.1–0.9)
MONOCYTES NFR BLD AUTO: 6.9 % (ref 5–12)
NEUTROPHILS # BLD AUTO: 3.77 10*3/MM3 (ref 1.7–7)
NEUTROPHILS NFR BLD AUTO: 71.8 % (ref 42.7–76)
NITRITE UR QL STRIP: NEGATIVE
PH UR STRIP.AUTO: 6.5 [PH] (ref 5–8)
PLATELET # BLD AUTO: 272 10*3/MM3 (ref 140–450)
PMV BLD AUTO: 8.9 FL (ref 6–12)
POTASSIUM BLD-SCNC: 4.1 MMOL/L (ref 3.5–5.2)
PROT SERPL-MCNC: 7.3 G/DL (ref 6–8.5)
PROT UR QL STRIP: NEGATIVE
PROTHROMBIN TIME: 12.9 SECONDS (ref 11–15.4)
RBC # BLD AUTO: 3.69 10*6/MM3 (ref 3.77–5.28)
SODIUM BLD-SCNC: 135 MMOL/L (ref 136–145)
SP GR UR STRIP: 1.02 (ref 1–1.03)
UROBILINOGEN UR QL STRIP: ABNORMAL
WBC NRBC COR # BLD: 5.25 10*3/MM3 (ref 3.4–10.8)

## 2019-06-16 PROCEDURE — 81003 URINALYSIS AUTO W/O SCOPE: CPT | Performed by: EMERGENCY MEDICINE

## 2019-06-16 PROCEDURE — 93971 EXTREMITY STUDY: CPT | Performed by: RADIOLOGY

## 2019-06-16 PROCEDURE — 36415 COLL VENOUS BLD VENIPUNCTURE: CPT

## 2019-06-16 PROCEDURE — 99283 EMERGENCY DEPT VISIT LOW MDM: CPT

## 2019-06-16 PROCEDURE — 80053 COMPREHEN METABOLIC PANEL: CPT | Performed by: EMERGENCY MEDICINE

## 2019-06-16 PROCEDURE — 85610 PROTHROMBIN TIME: CPT | Performed by: EMERGENCY MEDICINE

## 2019-06-16 PROCEDURE — 85025 COMPLETE CBC W/AUTO DIFF WBC: CPT | Performed by: EMERGENCY MEDICINE

## 2019-06-16 PROCEDURE — 93971 EXTREMITY STUDY: CPT

## 2019-06-16 RX ORDER — HYDROCODONE BITARTRATE AND ACETAMINOPHEN 5; 325 MG/1; MG/1
1 TABLET ORAL ONCE
Status: COMPLETED | OUTPATIENT
Start: 2019-06-16 | End: 2019-06-16

## 2019-06-16 RX ADMIN — HYDROCODONE BITARTRATE AND ACETAMINOPHEN 1 TABLET: 5; 325 TABLET ORAL at 11:17

## 2019-06-18 ENCOUNTER — HOSPITAL ENCOUNTER (EMERGENCY)
Facility: HOSPITAL | Age: 34
Discharge: HOME | End: 2019-06-18
Attending: EMERGENCY MEDICINE | Admitting: EMERGENCY MEDICINE

## 2019-06-18 VITALS
OXYGEN SATURATION: 98 % | HEIGHT: 64 IN | TEMPERATURE: 98.3 F | WEIGHT: 293 LBS | BODY MASS INDEX: 50.02 KG/M2 | SYSTOLIC BLOOD PRESSURE: 157 MMHG | DIASTOLIC BLOOD PRESSURE: 98 MMHG | HEART RATE: 87 BPM | RESPIRATION RATE: 18 BRPM

## 2019-06-18 DIAGNOSIS — R10.9 RIGHT FLANK PAIN: Primary | ICD-10-CM

## 2019-06-18 DIAGNOSIS — N83.8 MASS OF RIGHT OVARY: ICD-10-CM

## 2019-06-18 LAB
6-ACETYL MORPHINE: NEGATIVE
ALBUMIN SERPL-MCNC: 4.1 G/DL (ref 3.5–5.2)
ALBUMIN/GLOB SERPL: 1.1 G/DL
ALP SERPL-CCNC: 81 U/L (ref 39–117)
ALT SERPL W P-5'-P-CCNC: 61 U/L (ref 1–33)
AMPHET+METHAMPHET UR QL: NEGATIVE
ANION GAP SERPL CALCULATED.3IONS-SCNC: 18.1 MMOL/L
AST SERPL-CCNC: 109 U/L (ref 1–32)
BARBITURATES UR QL SCN: NEGATIVE
BASOPHILS # BLD AUTO: 0.02 10*3/MM3 (ref 0–0.2)
BASOPHILS NFR BLD AUTO: 0.3 % (ref 0–1.5)
BENZODIAZ UR QL SCN: NEGATIVE
BILIRUB SERPL-MCNC: 0.4 MG/DL (ref 0.2–1.2)
BILIRUB UR QL STRIP: NEGATIVE
BUN BLD-MCNC: 11 MG/DL (ref 6–20)
BUN/CREAT SERPL: 19.6 (ref 7–25)
BUPRENORPHINE SERPL-MCNC: NEGATIVE NG/ML
CALCIUM SPEC-SCNC: 9.4 MG/DL (ref 8.6–10.5)
CANNABINOIDS SERPL QL: NEGATIVE
CHLORIDE SERPL-SCNC: 100 MMOL/L (ref 98–107)
CLARITY UR: CLEAR
CO2 SERPL-SCNC: 20.9 MMOL/L (ref 22–29)
COCAINE UR QL: NEGATIVE
COLOR UR: YELLOW
CREAT BLD-MCNC: 0.56 MG/DL (ref 0.57–1)
DEPRECATED RDW RBC AUTO: 45 FL (ref 37–54)
EOSINOPHIL # BLD AUTO: 0.14 10*3/MM3 (ref 0–0.4)
EOSINOPHIL NFR BLD AUTO: 2.4 % (ref 0.3–6.2)
ERYTHROCYTE [DISTWIDTH] IN BLOOD BY AUTOMATED COUNT: 15.8 % (ref 12.3–15.4)
GFR SERPL CREATININE-BSD FRML MDRD: 125 ML/MIN/1.73
GLOBULIN UR ELPH-MCNC: 3.7 GM/DL
GLUCOSE BLD-MCNC: 150 MG/DL (ref 65–99)
GLUCOSE UR STRIP-MCNC: NEGATIVE MG/DL
HCT VFR BLD AUTO: 32.7 % (ref 34–46.6)
HGB BLD-MCNC: 10.1 G/DL (ref 12–15.9)
HGB UR QL STRIP.AUTO: NEGATIVE
IMM GRANULOCYTES # BLD AUTO: 0.01 10*3/MM3 (ref 0–0.05)
IMM GRANULOCYTES NFR BLD AUTO: 0.2 % (ref 0–0.5)
INR PPP: 0.92 (ref 0.9–1.1)
KETONES UR QL STRIP: NEGATIVE
LEUKOCYTE ESTERASE UR QL STRIP.AUTO: NEGATIVE
LIPASE SERPL-CCNC: 21 U/L (ref 13–60)
LYMPHOCYTES # BLD AUTO: 1.52 10*3/MM3 (ref 0.7–3.1)
LYMPHOCYTES NFR BLD AUTO: 26.3 % (ref 19.6–45.3)
MCH RBC QN AUTO: 25.3 PG (ref 26.6–33)
MCHC RBC AUTO-ENTMCNC: 30.9 G/DL (ref 31.5–35.7)
MCV RBC AUTO: 82 FL (ref 79–97)
METHADONE UR QL SCN: NEGATIVE
MONOCYTES # BLD AUTO: 0.48 10*3/MM3 (ref 0.1–0.9)
MONOCYTES NFR BLD AUTO: 8.3 % (ref 5–12)
NEUTROPHILS # BLD AUTO: 3.6 10*3/MM3 (ref 1.7–7)
NEUTROPHILS NFR BLD AUTO: 62.5 % (ref 42.7–76)
NITRITE UR QL STRIP: NEGATIVE
OPIATES UR QL: POSITIVE
OXYCODONE UR QL SCN: NEGATIVE
PCP UR QL SCN: NEGATIVE
PH UR STRIP.AUTO: <=5 [PH] (ref 5–8)
PLATELET # BLD AUTO: 279 10*3/MM3 (ref 140–450)
PMV BLD AUTO: 9.7 FL (ref 6–12)
POTASSIUM BLD-SCNC: 3.9 MMOL/L (ref 3.5–5.2)
PROT SERPL-MCNC: 7.8 G/DL (ref 6–8.5)
PROT UR QL STRIP: NEGATIVE
PROTHROMBIN TIME: 12.8 SECONDS (ref 11–15.4)
RBC # BLD AUTO: 3.99 10*6/MM3 (ref 3.77–5.28)
SODIUM BLD-SCNC: 139 MMOL/L (ref 136–145)
SP GR UR STRIP: 1.02 (ref 1–1.03)
UROBILINOGEN UR QL STRIP: NORMAL
WBC NRBC COR # BLD: 5.77 10*3/MM3 (ref 3.4–10.8)

## 2019-06-18 PROCEDURE — 80307 DRUG TEST PRSMV CHEM ANLYZR: CPT | Performed by: PHYSICIAN ASSISTANT

## 2019-06-18 PROCEDURE — 85610 PROTHROMBIN TIME: CPT | Performed by: PHYSICIAN ASSISTANT

## 2019-06-18 PROCEDURE — 80053 COMPREHEN METABOLIC PANEL: CPT | Performed by: PHYSICIAN ASSISTANT

## 2019-06-18 PROCEDURE — 96374 THER/PROPH/DIAG INJ IV PUSH: CPT

## 2019-06-18 PROCEDURE — 25010000002 ONDANSETRON PER 1 MG: Performed by: PHYSICIAN ASSISTANT

## 2019-06-18 PROCEDURE — 85025 COMPLETE CBC W/AUTO DIFF WBC: CPT | Performed by: PHYSICIAN ASSISTANT

## 2019-06-18 PROCEDURE — 81003 URINALYSIS AUTO W/O SCOPE: CPT | Performed by: PHYSICIAN ASSISTANT

## 2019-06-18 PROCEDURE — P9612 CATHETERIZE FOR URINE SPEC: HCPCS

## 2019-06-18 PROCEDURE — 99284 EMERGENCY DEPT VISIT MOD MDM: CPT

## 2019-06-18 PROCEDURE — 83690 ASSAY OF LIPASE: CPT | Performed by: PHYSICIAN ASSISTANT

## 2019-06-18 RX ORDER — ONDANSETRON 4 MG/1
4 TABLET, ORALLY DISINTEGRATING ORAL EVERY 8 HOURS PRN
Qty: 12 TABLET | Refills: 0 | Status: SHIPPED | OUTPATIENT
Start: 2019-06-18 | End: 2019-08-30

## 2019-06-18 RX ORDER — SODIUM CHLORIDE 0.9 % (FLUSH) 0.9 %
10 SYRINGE (ML) INJECTION AS NEEDED
Status: DISCONTINUED | OUTPATIENT
Start: 2019-06-18 | End: 2019-06-18 | Stop reason: HOSPADM

## 2019-06-18 RX ORDER — HYDROCODONE BITARTRATE AND ACETAMINOPHEN 5; 325 MG/1; MG/1
1 TABLET ORAL ONCE
Status: COMPLETED | OUTPATIENT
Start: 2019-06-18 | End: 2019-06-18

## 2019-06-18 RX ORDER — ONDANSETRON 2 MG/ML
4 INJECTION INTRAMUSCULAR; INTRAVENOUS ONCE
Status: COMPLETED | OUTPATIENT
Start: 2019-06-18 | End: 2019-06-18

## 2019-06-18 RX ORDER — GABAPENTIN 100 MG/1
100 CAPSULE ORAL 3 TIMES DAILY
COMMUNITY
End: 2021-01-20

## 2019-06-18 RX ADMIN — ONDANSETRON 4 MG: 2 INJECTION, SOLUTION INTRAMUSCULAR; INTRAVENOUS at 12:56

## 2019-06-18 RX ADMIN — HYDROCODONE BITARTRATE AND ACETAMINOPHEN 1 TABLET: 5; 325 TABLET ORAL at 13:39

## 2019-07-07 ENCOUNTER — HOSPITAL ENCOUNTER (EMERGENCY)
Facility: HOSPITAL | Age: 34
Discharge: LEFT BEFORE DISCHARGE | End: 2019-07-07
Attending: EMERGENCY MEDICINE | Admitting: EMERGENCY MEDICINE

## 2019-07-07 VITALS
WEIGHT: 293 LBS | BODY MASS INDEX: 50.02 KG/M2 | HEIGHT: 64 IN | HEART RATE: 124 BPM | TEMPERATURE: 97.9 F | RESPIRATION RATE: 18 BRPM | DIASTOLIC BLOOD PRESSURE: 142 MMHG | OXYGEN SATURATION: 94 % | SYSTOLIC BLOOD PRESSURE: 195 MMHG

## 2019-07-07 DIAGNOSIS — R10.9 FLANK PAIN: Primary | ICD-10-CM

## 2019-07-07 LAB
ALBUMIN SERPL-MCNC: 4.6 G/DL (ref 3.5–5)
ALBUMIN/GLOB SERPL: 1.2 G/DL (ref 1–2)
ALP SERPL-CCNC: 77 U/L (ref 38–126)
ALT SERPL W P-5'-P-CCNC: 52 U/L (ref 13–69)
ANION GAP SERPL CALCULATED.3IONS-SCNC: 19.4 MMOL/L (ref 10–20)
AST SERPL-CCNC: 98 U/L (ref 15–46)
BACTERIA UR QL AUTO: ABNORMAL /HPF
BASOPHILS # BLD AUTO: 0.03 10*3/MM3 (ref 0–0.2)
BASOPHILS NFR BLD AUTO: 0.7 % (ref 0–1.5)
BILIRUB SERPL-MCNC: 0.7 MG/DL (ref 0.2–1.3)
BILIRUB UR QL STRIP: NEGATIVE
BUN BLD-MCNC: 16 MG/DL (ref 7–20)
BUN/CREAT SERPL: 40 (ref 7.1–23.5)
CALCIUM SPEC-SCNC: 10.1 MG/DL (ref 8.4–10.2)
CHLORIDE SERPL-SCNC: 103 MMOL/L (ref 98–107)
CLARITY UR: ABNORMAL
CO2 SERPL-SCNC: 20 MMOL/L (ref 26–30)
COD CRY URNS QL: ABNORMAL /HPF
COLOR UR: ABNORMAL
CREAT BLD-MCNC: 0.4 MG/DL (ref 0.6–1.3)
DEPRECATED RDW RBC AUTO: 45.9 FL (ref 37–54)
EOSINOPHIL # BLD AUTO: 0.07 10*3/MM3 (ref 0–0.4)
EOSINOPHIL NFR BLD AUTO: 1.6 % (ref 0.3–6.2)
ERYTHROCYTE [DISTWIDTH] IN BLOOD BY AUTOMATED COUNT: 16.8 % (ref 12.3–15.4)
GFR SERPL CREATININE-BSD FRML MDRD: >150 ML/MIN/1.73
GLOBULIN UR ELPH-MCNC: 3.9 GM/DL
GLUCOSE BLD-MCNC: 261 MG/DL (ref 74–98)
GLUCOSE UR STRIP-MCNC: NEGATIVE MG/DL
HCT VFR BLD AUTO: 32.4 % (ref 34–46.6)
HGB BLD-MCNC: 10.1 G/DL (ref 12–15.9)
HGB UR QL STRIP.AUTO: ABNORMAL
HYALINE CASTS UR QL AUTO: ABNORMAL /LPF
IMM GRANULOCYTES # BLD AUTO: 0.01 10*3/MM3 (ref 0–0.05)
IMM GRANULOCYTES NFR BLD AUTO: 0.2 % (ref 0–0.5)
INR PPP: 1.28 (ref 0.9–1.1)
KETONES UR QL STRIP: NEGATIVE
LEUKOCYTE ESTERASE UR QL STRIP.AUTO: ABNORMAL
LIPASE SERPL-CCNC: 78 U/L (ref 23–300)
LYMPHOCYTES # BLD AUTO: 0.91 10*3/MM3 (ref 0.7–3.1)
LYMPHOCYTES NFR BLD AUTO: 21.3 % (ref 19.6–45.3)
MCH RBC QN AUTO: 24.5 PG (ref 26.6–33)
MCHC RBC AUTO-ENTMCNC: 31.2 G/DL (ref 31.5–35.7)
MCV RBC AUTO: 78.5 FL (ref 79–97)
MONOCYTES # BLD AUTO: 0.51 10*3/MM3 (ref 0.1–0.9)
MONOCYTES NFR BLD AUTO: 11.9 % (ref 5–12)
NEUTROPHILS # BLD AUTO: 2.74 10*3/MM3 (ref 1.7–7)
NEUTROPHILS NFR BLD AUTO: 64.3 % (ref 42.7–76)
NITRITE UR QL STRIP: NEGATIVE
NRBC BLD AUTO-RTO: 0 /100 WBC (ref 0–0.2)
PH UR STRIP.AUTO: 5.5 [PH] (ref 5–8)
PLATELET # BLD AUTO: 237 10*3/MM3 (ref 140–450)
PMV BLD AUTO: 9.6 FL (ref 6–12)
POTASSIUM BLD-SCNC: 4.4 MMOL/L (ref 3.5–5.1)
PROT SERPL-MCNC: 8.5 G/DL (ref 6.3–8.2)
PROT UR QL STRIP: ABNORMAL
PROTHROMBIN TIME: 16.4 SECONDS (ref 12–15.1)
RBC # BLD AUTO: 4.13 10*6/MM3 (ref 3.77–5.28)
RBC # UR: ABNORMAL /HPF
REF LAB TEST METHOD: ABNORMAL
SODIUM BLD-SCNC: 138 MMOL/L (ref 137–145)
SP GR UR STRIP: 1.03 (ref 1–1.03)
SQUAMOUS #/AREA URNS HPF: ABNORMAL /HPF
UROBILINOGEN UR QL STRIP: ABNORMAL
WBC NRBC COR # BLD: 4.27 10*3/MM3 (ref 3.4–10.8)
WBC UR QL AUTO: ABNORMAL /HPF

## 2019-07-07 PROCEDURE — 25010000002 DIPHENHYDRAMINE PER 50 MG: Performed by: EMERGENCY MEDICINE

## 2019-07-07 PROCEDURE — 83690 ASSAY OF LIPASE: CPT | Performed by: EMERGENCY MEDICINE

## 2019-07-07 PROCEDURE — 81001 URINALYSIS AUTO W/SCOPE: CPT | Performed by: EMERGENCY MEDICINE

## 2019-07-07 PROCEDURE — 85025 COMPLETE CBC W/AUTO DIFF WBC: CPT | Performed by: EMERGENCY MEDICINE

## 2019-07-07 PROCEDURE — 80053 COMPREHEN METABOLIC PANEL: CPT | Performed by: EMERGENCY MEDICINE

## 2019-07-07 PROCEDURE — 85610 PROTHROMBIN TIME: CPT | Performed by: EMERGENCY MEDICINE

## 2019-07-07 PROCEDURE — 99283 EMERGENCY DEPT VISIT LOW MDM: CPT

## 2019-07-07 PROCEDURE — 96374 THER/PROPH/DIAG INJ IV PUSH: CPT

## 2019-07-07 RX ORDER — ACETAMINOPHEN 500 MG
1000 TABLET ORAL ONCE
Status: COMPLETED | OUTPATIENT
Start: 2019-07-07 | End: 2019-07-07

## 2019-07-07 RX ORDER — HALOPERIDOL 5 MG/ML
5 INJECTION INTRAMUSCULAR ONCE
Status: DISCONTINUED | OUTPATIENT
Start: 2019-07-07 | End: 2019-07-07 | Stop reason: HOSPADM

## 2019-07-07 RX ORDER — DIPHENHYDRAMINE HYDROCHLORIDE 50 MG/ML
25 INJECTION INTRAMUSCULAR; INTRAVENOUS ONCE
Status: COMPLETED | OUTPATIENT
Start: 2019-07-07 | End: 2019-07-07

## 2019-07-07 RX ADMIN — SODIUM CHLORIDE 1000 ML: 9 INJECTION, SOLUTION INTRAVENOUS at 05:53

## 2019-07-07 RX ADMIN — ACETAMINOPHEN 1000 MG: 500 TABLET, FILM COATED ORAL at 05:56

## 2019-07-07 RX ADMIN — DIPHENHYDRAMINE HYDROCHLORIDE 25 MG: 50 INJECTION, SOLUTION INTRAMUSCULAR; INTRAVENOUS at 05:56

## 2019-07-07 NOTE — ED PROVIDER NOTES
Subjective   34 year old female presenting with flank pain. She states that for the last two days she has had severe sharp left flank pain, it radiates around to her abdomen, no alleviating/aggravating factors. Associated with nausea, vomiting, hematuria. No dysuria, fevers, chills or other complaints. Patient has been seen here and in [place] multiple times for same complaint, had CT one month ago that showed no ureteral stone.            Review of Systems   Constitutional: Negative.    HENT: Negative.    Eyes: Negative.    Respiratory: Negative.    Cardiovascular: Negative.    Gastrointestinal: Positive for nausea and vomiting. Negative for abdominal pain.   Genitourinary: Positive for flank pain and hematuria. Negative for dysuria.   Skin: Negative.    Neurological: Negative.    Psychiatric/Behavioral: Negative.        Past Medical History:   Diagnosis Date   • Abnormal ECG    • Anemia    • Anxiety    • Asthma    • Depression    • Diabetes mellitus (CMS/HCC)    • DVT (deep venous thrombosis) (CMS/HCC)    • Factor 5 Leiden mutation, heterozygous (CMS/HCC)    • Fibroid    • GERD (gastroesophageal reflux disease)    • Gestational diabetes    • Gout    • Hyperlipidemia    • Hypothyroid    • Kidney stone    • Neuropathy    • Ovarian cyst    • PE (pulmonary embolism)    • Polycystic ovary syndrome    • Preeclampsia    • Rh incompatibility    • Urinary tract infection    • Varicella        Allergies   Allergen Reactions   • Amoxicillin Hives   • Penicillins Hives   • Toradol [Ketorolac Tromethamine] Hives   • Tramadol Swelling       Past Surgical History:   Procedure Laterality Date   • CARDIAC SURGERY      Heart Cath done in    •  SECTION     • CHOLECYSTECTOMY     • COLONOSCOPY         Family History   Problem Relation Age of Onset   • No Known Problems Mother    • No Known Problems Father    • No Known Problems Sister    • No Known Problems Brother    • No Known Problems Son    • No Known Problems Daughter     • No Known Problems Maternal Grandmother    • No Known Problems Maternal Grandfather    • No Known Problems Paternal Grandmother    • No Known Problems Paternal Grandfather    • No Known Problems Cousin    • Rheum arthritis Neg Hx    • Osteoarthritis Neg Hx    • Asthma Neg Hx    • Diabetes Neg Hx    • Heart failure Neg Hx    • Hyperlipidemia Neg Hx    • Hypertension Neg Hx    • Migraines Neg Hx    • Rashes / Skin problems Neg Hx    • Seizures Neg Hx    • Stroke Neg Hx    • Thyroid disease Neg Hx        Social History     Socioeconomic History   • Marital status:      Spouse name: Not on file   • Number of children: Not on file   • Years of education: Not on file   • Highest education level: Not on file   Tobacco Use   • Smoking status: Never Smoker   • Smokeless tobacco: Never Used   Substance and Sexual Activity   • Alcohol use: No   • Drug use: No   • Sexual activity: Defer           Objective   Physical Exam   Constitutional: She is oriented to person, place, and time. She appears well-developed and well-nourished. No distress.   HENT:   Head: Normocephalic and atraumatic.   Right Ear: External ear normal.   Left Ear: External ear normal.   Nose: Nose normal.   Mouth/Throat: Oropharynx is clear and moist.   Eyes: Conjunctivae and EOM are normal. Pupils are equal, round, and reactive to light.   Neck: Normal range of motion. Neck supple.   Cardiovascular: Regular rhythm, normal heart sounds and intact distal pulses.   Tachycardia    Pulmonary/Chest: Effort normal and breath sounds normal. No respiratory distress.   Abdominal: Soft. Bowel sounds are normal. She exhibits no distension. There is no tenderness. There is no rebound and no guarding.   Musculoskeletal: Normal range of motion. She exhibits no edema, tenderness or deformity.   Neurological: She is alert and oriented to person, place, and time.   Skin: Skin is warm and dry. No rash noted.   Psychiatric: She has a normal mood and affect. Her  behavior is normal.   Nursing note and vitals reviewed.      Procedures           ED Course                  MDM  Number of Diagnoses or Management Options  Flank pain:   Diagnosis management comments: 34 year old female with flank pain. Well developed, well nourished morbidly obese lady in no distress with exam as above. She appears very comfortable. Will check labs and UA. Given her multiple recent visits and recent CT scan do not feel that is indicated to be repeated at this time. Will give symptomatic treatment and IV fluids. Disposition pending work up.    DDX: chronic pain, UTI, stone    Was notified by nursing that patient was signing out AGAINST MEDICAL ADVICE.  Of note work-up was unremarkable.        Final diagnoses:   Flank pain            Ted Kruse MD  07/07/19 8017

## 2019-07-20 ENCOUNTER — HOSPITAL ENCOUNTER (EMERGENCY)
Facility: HOSPITAL | Age: 34
Discharge: HOME | End: 2019-07-20
Attending: EMERGENCY MEDICINE | Admitting: EMERGENCY MEDICINE

## 2019-07-20 VITALS
DIASTOLIC BLOOD PRESSURE: 101 MMHG | WEIGHT: 293 LBS | HEIGHT: 64 IN | SYSTOLIC BLOOD PRESSURE: 153 MMHG | HEART RATE: 95 BPM | OXYGEN SATURATION: 99 % | TEMPERATURE: 98.9 F | RESPIRATION RATE: 18 BRPM | BODY MASS INDEX: 50.02 KG/M2

## 2019-07-20 DIAGNOSIS — R10.9 LEFT FLANK PAIN: Primary | ICD-10-CM

## 2019-07-20 LAB
B-HCG UR QL: NEGATIVE
BACTERIA UR QL AUTO: ABNORMAL /HPF
BILIRUB UR QL STRIP: ABNORMAL
CLARITY UR: ABNORMAL
COLOR UR: ABNORMAL
GLUCOSE UR STRIP-MCNC: NEGATIVE MG/DL
HGB UR QL STRIP.AUTO: ABNORMAL
HYALINE CASTS UR QL AUTO: ABNORMAL /LPF
KETONES UR QL STRIP: NEGATIVE
LEUKOCYTE ESTERASE UR QL STRIP.AUTO: ABNORMAL
NITRITE UR QL STRIP: NEGATIVE
PH UR STRIP.AUTO: <=5 [PH] (ref 5–8)
PROT UR QL STRIP: ABNORMAL
RBC # UR: ABNORMAL /HPF
REF LAB TEST METHOD: ABNORMAL
SP GR UR STRIP: 1.02 (ref 1–1.03)
SQUAMOUS #/AREA URNS HPF: ABNORMAL /HPF
UROBILINOGEN UR QL STRIP: ABNORMAL
WBC UR QL AUTO: ABNORMAL /HPF

## 2019-07-20 PROCEDURE — 99283 EMERGENCY DEPT VISIT LOW MDM: CPT

## 2019-07-20 PROCEDURE — 25010000002 ORPHENADRINE CITRATE PER 60 MG: Performed by: PHYSICIAN ASSISTANT

## 2019-07-20 PROCEDURE — 81001 URINALYSIS AUTO W/SCOPE: CPT | Performed by: PHYSICIAN ASSISTANT

## 2019-07-20 PROCEDURE — 96372 THER/PROPH/DIAG INJ SC/IM: CPT

## 2019-07-20 PROCEDURE — 81025 URINE PREGNANCY TEST: CPT | Performed by: PHYSICIAN ASSISTANT

## 2019-07-20 RX ORDER — ONDANSETRON 4 MG/1
4 TABLET, ORALLY DISINTEGRATING ORAL ONCE
Status: COMPLETED | OUTPATIENT
Start: 2019-07-20 | End: 2019-07-20

## 2019-07-20 RX ORDER — HYDROCODONE BITARTRATE AND ACETAMINOPHEN 7.5; 325 MG/1; MG/1
1 TABLET ORAL ONCE
Status: COMPLETED | OUTPATIENT
Start: 2019-07-20 | End: 2019-07-20

## 2019-07-20 RX ORDER — ORPHENADRINE CITRATE 30 MG/ML
60 INJECTION INTRAMUSCULAR; INTRAVENOUS ONCE
Status: COMPLETED | OUTPATIENT
Start: 2019-07-20 | End: 2019-07-20

## 2019-07-20 RX ADMIN — ORPHENADRINE CITRATE 60 MG: 30 INJECTION INTRAMUSCULAR; INTRAVENOUS at 07:35

## 2019-07-20 RX ADMIN — ONDANSETRON 4 MG: 4 TABLET, ORALLY DISINTEGRATING ORAL at 07:35

## 2019-07-20 RX ADMIN — HYDROCODONE BITARTRATE AND ACETAMINOPHEN 1 TABLET: 7.5; 325 TABLET ORAL at 08:34

## 2019-07-27 ENCOUNTER — HOSPITAL ENCOUNTER (EMERGENCY)
Facility: HOSPITAL | Age: 34
Discharge: HOME | End: 2019-07-28
Attending: EMERGENCY MEDICINE | Admitting: EMERGENCY MEDICINE

## 2019-07-27 DIAGNOSIS — R31.29 HEMATURIA, MICROSCOPIC: ICD-10-CM

## 2019-07-27 DIAGNOSIS — G89.4 CHRONIC PAIN SYNDROME: ICD-10-CM

## 2019-07-27 DIAGNOSIS — R73.9 HYPERGLYCEMIA: ICD-10-CM

## 2019-07-27 DIAGNOSIS — R10.9 LEFT FLANK PAIN: Primary | ICD-10-CM

## 2019-07-27 DIAGNOSIS — E86.0 DEHYDRATION: ICD-10-CM

## 2019-07-27 DIAGNOSIS — R53.1 WEAKNESS: ICD-10-CM

## 2019-07-27 LAB
ALBUMIN SERPL-MCNC: 4.24 G/DL (ref 3.5–5.2)
ALBUMIN/GLOB SERPL: 1.1 G/DL
ALP SERPL-CCNC: 83 U/L (ref 39–117)
ALT SERPL W P-5'-P-CCNC: 51 U/L (ref 1–33)
ANION GAP SERPL CALCULATED.3IONS-SCNC: 18.9 MMOL/L (ref 5–15)
AST SERPL-CCNC: 74 U/L (ref 1–32)
BACTERIA UR QL AUTO: ABNORMAL /HPF
BASOPHILS # BLD AUTO: 0.01 10*3/MM3 (ref 0–0.2)
BASOPHILS NFR BLD AUTO: 0.2 % (ref 0–1.5)
BILIRUB SERPL-MCNC: 0.4 MG/DL (ref 0.2–1.2)
BILIRUB UR QL STRIP: NEGATIVE
BUN BLD-MCNC: 9 MG/DL (ref 6–20)
BUN/CREAT SERPL: 15.5 (ref 7–25)
CALCIUM SPEC-SCNC: 10.3 MG/DL (ref 8.6–10.5)
CHLORIDE SERPL-SCNC: 97 MMOL/L (ref 98–107)
CLARITY UR: ABNORMAL
CO2 SERPL-SCNC: 23.1 MMOL/L (ref 22–29)
COLOR UR: ABNORMAL
CREAT BLD-MCNC: 0.58 MG/DL (ref 0.57–1)
DEPRECATED RDW RBC AUTO: 46.1 FL (ref 37–54)
EOSINOPHIL # BLD AUTO: 0.1 10*3/MM3 (ref 0–0.4)
EOSINOPHIL NFR BLD AUTO: 2.1 % (ref 0.3–6.2)
ERYTHROCYTE [DISTWIDTH] IN BLOOD BY AUTOMATED COUNT: 17.6 % (ref 12.3–15.4)
GFR SERPL CREATININE-BSD FRML MDRD: 120 ML/MIN/1.73
GLOBULIN UR ELPH-MCNC: 3.9 GM/DL
GLUCOSE BLD-MCNC: 194 MG/DL (ref 65–99)
GLUCOSE UR STRIP-MCNC: NEGATIVE MG/DL
HCT VFR BLD AUTO: 33.6 % (ref 34–46.6)
HGB BLD-MCNC: 10.2 G/DL (ref 12–15.9)
HGB UR QL STRIP.AUTO: ABNORMAL
HYALINE CASTS UR QL AUTO: ABNORMAL /LPF
IMM GRANULOCYTES # BLD AUTO: 0 10*3/MM3 (ref 0–0.05)
IMM GRANULOCYTES NFR BLD AUTO: 0 % (ref 0–0.5)
INR PPP: 2.24 (ref 0.9–1.1)
KETONES UR QL STRIP: NEGATIVE
LEUKOCYTE ESTERASE UR QL STRIP.AUTO: ABNORMAL
LIPASE SERPL-CCNC: 38 U/L (ref 13–60)
LYMPHOCYTES # BLD AUTO: 1.25 10*3/MM3 (ref 0.7–3.1)
LYMPHOCYTES NFR BLD AUTO: 25.7 % (ref 19.6–45.3)
MCH RBC QN AUTO: 24 PG (ref 26.6–33)
MCHC RBC AUTO-ENTMCNC: 30.4 G/DL (ref 31.5–35.7)
MCV RBC AUTO: 79.1 FL (ref 79–97)
MONOCYTES # BLD AUTO: 0.48 10*3/MM3 (ref 0.1–0.9)
MONOCYTES NFR BLD AUTO: 9.9 % (ref 5–12)
NEUTROPHILS # BLD AUTO: 3.02 10*3/MM3 (ref 1.7–7)
NEUTROPHILS NFR BLD AUTO: 62.1 % (ref 42.7–76)
NITRITE UR QL STRIP: NEGATIVE
PH UR STRIP.AUTO: >=9 [PH] (ref 5–8)
PLATELET # BLD AUTO: 234 10*3/MM3 (ref 140–450)
PMV BLD AUTO: 10 FL (ref 6–12)
POTASSIUM BLD-SCNC: 4 MMOL/L (ref 3.5–5.2)
PROT SERPL-MCNC: 8.1 G/DL (ref 6–8.5)
PROT UR QL STRIP: ABNORMAL
PROTHROMBIN TIME: 25.9 SECONDS (ref 11–15.4)
RBC # BLD AUTO: 4.25 10*6/MM3 (ref 3.77–5.28)
RBC # UR: ABNORMAL /HPF
REF LAB TEST METHOD: ABNORMAL
SODIUM BLD-SCNC: 139 MMOL/L (ref 136–145)
SP GR UR STRIP: 1.01 (ref 1–1.03)
SQUAMOUS #/AREA URNS HPF: ABNORMAL /HPF
UROBILINOGEN UR QL STRIP: ABNORMAL
WBC NRBC COR # BLD: 4.86 10*3/MM3 (ref 3.4–10.8)
WBC UR QL AUTO: ABNORMAL /HPF

## 2019-07-27 PROCEDURE — 96375 TX/PRO/DX INJ NEW DRUG ADDON: CPT

## 2019-07-27 PROCEDURE — 25010000002 METHYLPREDNISOLONE PER 125 MG: Performed by: EMERGENCY MEDICINE

## 2019-07-27 PROCEDURE — 81001 URINALYSIS AUTO W/SCOPE: CPT | Performed by: EMERGENCY MEDICINE

## 2019-07-27 PROCEDURE — 85610 PROTHROMBIN TIME: CPT | Performed by: EMERGENCY MEDICINE

## 2019-07-27 PROCEDURE — 85025 COMPLETE CBC W/AUTO DIFF WBC: CPT | Performed by: EMERGENCY MEDICINE

## 2019-07-27 PROCEDURE — 99283 EMERGENCY DEPT VISIT LOW MDM: CPT

## 2019-07-27 PROCEDURE — 83690 ASSAY OF LIPASE: CPT | Performed by: EMERGENCY MEDICINE

## 2019-07-27 PROCEDURE — 80053 COMPREHEN METABOLIC PANEL: CPT | Performed by: EMERGENCY MEDICINE

## 2019-07-27 PROCEDURE — 25010000002 ONDANSETRON PER 1 MG: Performed by: EMERGENCY MEDICINE

## 2019-07-27 PROCEDURE — 96374 THER/PROPH/DIAG INJ IV PUSH: CPT

## 2019-07-27 PROCEDURE — 36415 COLL VENOUS BLD VENIPUNCTURE: CPT

## 2019-07-27 RX ORDER — SODIUM CHLORIDE 0.9 % (FLUSH) 0.9 %
10 SYRINGE (ML) INJECTION AS NEEDED
Status: DISCONTINUED | OUTPATIENT
Start: 2019-07-27 | End: 2019-07-28 | Stop reason: HOSPADM

## 2019-07-27 RX ORDER — DICYCLOMINE HYDROCHLORIDE 10 MG/1
20 CAPSULE ORAL ONCE
Status: COMPLETED | OUTPATIENT
Start: 2019-07-27 | End: 2019-07-27

## 2019-07-27 RX ORDER — METHYLPREDNISOLONE SODIUM SUCCINATE 125 MG/2ML
125 INJECTION, POWDER, LYOPHILIZED, FOR SOLUTION INTRAMUSCULAR; INTRAVENOUS ONCE
Status: COMPLETED | OUTPATIENT
Start: 2019-07-27 | End: 2019-07-27

## 2019-07-27 RX ORDER — ONDANSETRON 2 MG/ML
4 INJECTION INTRAMUSCULAR; INTRAVENOUS ONCE
Status: COMPLETED | OUTPATIENT
Start: 2019-07-27 | End: 2019-07-27

## 2019-07-27 RX ADMIN — METHYLPREDNISOLONE SODIUM SUCCINATE 125 MG: 125 INJECTION, POWDER, FOR SOLUTION INTRAMUSCULAR; INTRAVENOUS at 23:19

## 2019-07-27 RX ADMIN — DICYCLOMINE HYDROCHLORIDE 20 MG: 10 CAPSULE ORAL at 23:15

## 2019-07-27 RX ADMIN — ONDANSETRON 4 MG: 2 INJECTION, SOLUTION INTRAMUSCULAR; INTRAVENOUS at 23:18

## 2019-07-27 RX ADMIN — SODIUM CHLORIDE 1000 ML: 9 INJECTION, SOLUTION INTRAVENOUS at 23:18

## 2019-07-28 ENCOUNTER — APPOINTMENT (OUTPATIENT)
Dept: GENERAL RADIOLOGY | Facility: HOSPITAL | Age: 34
End: 2019-07-28

## 2019-07-28 VITALS
SYSTOLIC BLOOD PRESSURE: 167 MMHG | HEART RATE: 92 BPM | HEIGHT: 64 IN | DIASTOLIC BLOOD PRESSURE: 88 MMHG | TEMPERATURE: 97.7 F | WEIGHT: 293 LBS | BODY MASS INDEX: 50.02 KG/M2 | OXYGEN SATURATION: 97 % | RESPIRATION RATE: 18 BRPM

## 2019-07-28 PROCEDURE — 74018 RADEX ABDOMEN 1 VIEW: CPT | Performed by: RADIOLOGY

## 2019-07-28 PROCEDURE — 74018 RADEX ABDOMEN 1 VIEW: CPT

## 2019-07-28 RX ORDER — OXYCODONE HYDROCHLORIDE AND ACETAMINOPHEN 5; 325 MG/1; MG/1
1 TABLET ORAL ONCE
Status: COMPLETED | OUTPATIENT
Start: 2019-07-28 | End: 2019-07-28

## 2019-07-28 RX ADMIN — OXYCODONE HYDROCHLORIDE AND ACETAMINOPHEN 1 TABLET: 5; 325 TABLET ORAL at 02:58

## 2019-08-30 ENCOUNTER — HOSPITAL ENCOUNTER (EMERGENCY)
Facility: HOSPITAL | Age: 34
Discharge: LEFT BEFORE DISCHARGE | End: 2019-08-30
Attending: EMERGENCY MEDICINE | Admitting: EMERGENCY MEDICINE

## 2019-08-30 VITALS
HEIGHT: 64 IN | SYSTOLIC BLOOD PRESSURE: 194 MMHG | HEART RATE: 113 BPM | TEMPERATURE: 98.3 F | WEIGHT: 293 LBS | OXYGEN SATURATION: 95 % | DIASTOLIC BLOOD PRESSURE: 120 MMHG | BODY MASS INDEX: 50.02 KG/M2 | RESPIRATION RATE: 18 BRPM

## 2019-08-30 DIAGNOSIS — Z76.5 DRUG-SEEKING BEHAVIOR: Primary | ICD-10-CM

## 2019-08-30 LAB
ALBUMIN SERPL-MCNC: 4.4 G/DL (ref 3.5–5.2)
ALBUMIN/GLOB SERPL: 1.3 G/DL
ALP SERPL-CCNC: 85 U/L (ref 39–117)
ALT SERPL W P-5'-P-CCNC: 55 U/L (ref 1–33)
ANION GAP SERPL CALCULATED.3IONS-SCNC: 20.2 MMOL/L (ref 5–15)
ANISOCYTOSIS BLD QL: ABNORMAL
AST SERPL-CCNC: 73 U/L (ref 1–32)
BACTERIA UR QL AUTO: ABNORMAL /HPF
BASOPHILS # BLD MANUAL: 0.09 10*3/MM3 (ref 0–0.2)
BASOPHILS NFR BLD AUTO: 2 % (ref 0–1.5)
BILIRUB SERPL-MCNC: 0.6 MG/DL (ref 0.2–1.2)
BILIRUB UR QL STRIP: NEGATIVE
BUN BLD-MCNC: 15 MG/DL (ref 6–20)
BUN/CREAT SERPL: 19 (ref 7–25)
CALCIUM SPEC-SCNC: 9.6 MG/DL (ref 8.6–10.5)
CHLORIDE SERPL-SCNC: 95 MMOL/L (ref 98–107)
CLARITY UR: ABNORMAL
CO2 SERPL-SCNC: 18.8 MMOL/L (ref 22–29)
COLOR UR: ABNORMAL
CREAT BLD-MCNC: 0.79 MG/DL (ref 0.57–1)
DEPRECATED RDW RBC AUTO: 60.9 FL (ref 37–54)
ERYTHROCYTE [DISTWIDTH] IN BLOOD BY AUTOMATED COUNT: 19.5 % (ref 12.3–15.4)
GFR SERPL CREATININE-BSD FRML MDRD: 84 ML/MIN/1.73
GLOBULIN UR ELPH-MCNC: 3.4 GM/DL
GLUCOSE BLD-MCNC: 695 MG/DL (ref 65–99)
GLUCOSE UR STRIP-MCNC: NEGATIVE MG/DL
HCG SERPL QL: NEGATIVE
HCT VFR BLD AUTO: 35.6 % (ref 34–46.6)
HGB BLD-MCNC: 10.4 G/DL (ref 12–15.9)
HGB UR QL STRIP.AUTO: ABNORMAL
HYALINE CASTS UR QL AUTO: ABNORMAL /LPF
INR PPP: 1.15 (ref 0.9–1.1)
KETONES UR QL STRIP: NEGATIVE
LEUKOCYTE ESTERASE UR QL STRIP.AUTO: ABNORMAL
LYMPHOCYTES # BLD MANUAL: 0.13 10*3/MM3 (ref 0.7–3.1)
LYMPHOCYTES NFR BLD MANUAL: 1 % (ref 5–12)
LYMPHOCYTES NFR BLD MANUAL: 3 % (ref 19.6–45.3)
MCH RBC QN AUTO: 25.7 PG (ref 26.6–33)
MCHC RBC AUTO-ENTMCNC: 29.2 G/DL (ref 31.5–35.7)
MCV RBC AUTO: 88.1 FL (ref 79–97)
MONOCYTES # BLD AUTO: 0.04 10*3/MM3 (ref 0.1–0.9)
NEUTROPHILS # BLD AUTO: 4.1 10*3/MM3 (ref 1.7–7)
NEUTROPHILS NFR BLD MANUAL: 93.9 % (ref 42.7–76)
NITRITE UR QL STRIP: POSITIVE
PH UR STRIP.AUTO: <=5 [PH] (ref 5–8)
PLAT MORPH BLD: NORMAL
PLATELET # BLD AUTO: 251 10*3/MM3 (ref 140–450)
PMV BLD AUTO: 10.2 FL (ref 6–12)
POTASSIUM BLD-SCNC: 4.7 MMOL/L (ref 3.5–5.2)
PROT SERPL-MCNC: 7.8 G/DL (ref 6–8.5)
PROT UR QL STRIP: ABNORMAL
PROTHROMBIN TIME: 14.5 SECONDS (ref 11.7–14.2)
RBC # BLD AUTO: 4.04 10*6/MM3 (ref 3.77–5.28)
RBC # UR: ABNORMAL /HPF
REF LAB TEST METHOD: ABNORMAL
SODIUM BLD-SCNC: 134 MMOL/L (ref 136–145)
SP GR UR STRIP: 1.03 (ref 1–1.03)
SQUAMOUS #/AREA URNS HPF: ABNORMAL /HPF
UROBILINOGEN UR QL STRIP: ABNORMAL
WBC MORPH BLD: NORMAL
WBC NRBC COR # BLD: 4.37 10*3/MM3 (ref 3.4–10.8)
WBC UR QL AUTO: ABNORMAL /HPF

## 2019-08-30 PROCEDURE — 80053 COMPREHEN METABOLIC PANEL: CPT | Performed by: PHYSICIAN ASSISTANT

## 2019-08-30 PROCEDURE — 81001 URINALYSIS AUTO W/SCOPE: CPT | Performed by: PHYSICIAN ASSISTANT

## 2019-08-30 PROCEDURE — 84703 CHORIONIC GONADOTROPIN ASSAY: CPT | Performed by: PHYSICIAN ASSISTANT

## 2019-08-30 PROCEDURE — 85025 COMPLETE CBC W/AUTO DIFF WBC: CPT | Performed by: PHYSICIAN ASSISTANT

## 2019-08-30 PROCEDURE — 36415 COLL VENOUS BLD VENIPUNCTURE: CPT

## 2019-08-30 PROCEDURE — 85007 BL SMEAR W/DIFF WBC COUNT: CPT | Performed by: PHYSICIAN ASSISTANT

## 2019-08-30 PROCEDURE — 85610 PROTHROMBIN TIME: CPT | Performed by: PHYSICIAN ASSISTANT

## 2019-08-30 PROCEDURE — 99282 EMERGENCY DEPT VISIT SF MDM: CPT

## 2019-08-30 RX ORDER — ONDANSETRON 2 MG/ML
4 INJECTION INTRAMUSCULAR; INTRAVENOUS ONCE
Status: DISCONTINUED | OUTPATIENT
Start: 2019-08-30 | End: 2019-08-30 | Stop reason: HOSPADM

## 2019-09-22 ENCOUNTER — HOSPITAL ENCOUNTER (EMERGENCY)
Facility: HOSPITAL | Age: 34
Discharge: HOME | End: 2019-09-23
Attending: EMERGENCY MEDICINE | Admitting: EMERGENCY MEDICINE

## 2019-09-22 ENCOUNTER — APPOINTMENT (OUTPATIENT)
Dept: CT IMAGING | Facility: HOSPITAL | Age: 34
End: 2019-09-22

## 2019-09-22 ENCOUNTER — APPOINTMENT (OUTPATIENT)
Dept: GENERAL RADIOLOGY | Facility: HOSPITAL | Age: 34
End: 2019-09-22

## 2019-09-22 DIAGNOSIS — R06.02 SHORTNESS OF BREATH: ICD-10-CM

## 2019-09-22 DIAGNOSIS — R07.9 NONSPECIFIC CHEST PAIN: ICD-10-CM

## 2019-09-22 DIAGNOSIS — N30.01 ACUTE CYSTITIS WITH HEMATURIA: ICD-10-CM

## 2019-09-22 DIAGNOSIS — R10.9 FLANK PAIN: ICD-10-CM

## 2019-09-22 DIAGNOSIS — N20.0 KIDNEY STONE: Primary | ICD-10-CM

## 2019-09-22 DIAGNOSIS — N83.201 CYST OF RIGHT OVARY: ICD-10-CM

## 2019-09-22 LAB
ALBUMIN SERPL-MCNC: 4.44 G/DL (ref 3.5–5.2)
ALBUMIN/GLOB SERPL: 1.3 G/DL
ALP SERPL-CCNC: 92 U/L (ref 39–117)
ALT SERPL W P-5'-P-CCNC: 70 U/L (ref 1–33)
ANION GAP SERPL CALCULATED.3IONS-SCNC: 19.4 MMOL/L (ref 5–15)
AST SERPL-CCNC: 113 U/L (ref 1–32)
BACTERIA UR QL AUTO: ABNORMAL /HPF
BASOPHILS # BLD AUTO: 0.01 10*3/MM3 (ref 0–0.2)
BASOPHILS NFR BLD AUTO: 0.2 % (ref 0–1.5)
BILIRUB SERPL-MCNC: 0.3 MG/DL (ref 0.2–1.2)
BILIRUB UR QL STRIP: NEGATIVE
BUN BLD-MCNC: 4 MG/DL (ref 6–20)
BUN/CREAT SERPL: 7.7 (ref 7–25)
CALCIUM SPEC-SCNC: 9.5 MG/DL (ref 8.6–10.5)
CHLORIDE SERPL-SCNC: 98 MMOL/L (ref 98–107)
CLARITY UR: CLEAR
CO2 SERPL-SCNC: 20.6 MMOL/L (ref 22–29)
COLOR UR: ABNORMAL
CREAT BLD-MCNC: 0.52 MG/DL (ref 0.57–1)
D DIMER PPP FEU-MCNC: <0.27 MCGFEU/ML (ref 0–0.5)
DEPRECATED RDW RBC AUTO: 58.7 FL (ref 37–54)
EOSINOPHIL # BLD AUTO: 0.08 10*3/MM3 (ref 0–0.4)
EOSINOPHIL NFR BLD AUTO: 1.9 % (ref 0.3–6.2)
ERYTHROCYTE [DISTWIDTH] IN BLOOD BY AUTOMATED COUNT: 18.7 % (ref 12.3–15.4)
GFR SERPL CREATININE-BSD FRML MDRD: 136 ML/MIN/1.73
GLOBULIN UR ELPH-MCNC: 3.5 GM/DL
GLUCOSE BLD-MCNC: 367 MG/DL (ref 65–99)
GLUCOSE UR STRIP-MCNC: ABNORMAL MG/DL
HCG SERPL QL: NEGATIVE
HCT VFR BLD AUTO: 35.2 % (ref 34–46.6)
HGB BLD-MCNC: 10.8 G/DL (ref 12–15.9)
HGB UR QL STRIP.AUTO: ABNORMAL
HYALINE CASTS UR QL AUTO: ABNORMAL /LPF
IMM GRANULOCYTES # BLD AUTO: 0.02 10*3/MM3 (ref 0–0.05)
IMM GRANULOCYTES NFR BLD AUTO: 0.5 % (ref 0–0.5)
INR PPP: 1.09 (ref 0.9–1.1)
KETONES UR QL STRIP: ABNORMAL
LEUKOCYTE ESTERASE UR QL STRIP.AUTO: NEGATIVE
LYMPHOCYTES # BLD AUTO: 0.88 10*3/MM3 (ref 0.7–3.1)
LYMPHOCYTES NFR BLD AUTO: 21.3 % (ref 19.6–45.3)
MCH RBC QN AUTO: 26.9 PG (ref 26.6–33)
MCHC RBC AUTO-ENTMCNC: 30.7 G/DL (ref 31.5–35.7)
MCV RBC AUTO: 87.8 FL (ref 79–97)
MONOCYTES # BLD AUTO: 0.34 10*3/MM3 (ref 0.1–0.9)
MONOCYTES NFR BLD AUTO: 8.2 % (ref 5–12)
NEUTROPHILS # BLD AUTO: 2.8 10*3/MM3 (ref 1.7–7)
NEUTROPHILS NFR BLD AUTO: 67.9 % (ref 42.7–76)
NITRITE UR QL STRIP: NEGATIVE
PH UR STRIP.AUTO: 5.5 [PH] (ref 5–8)
PLATELET # BLD AUTO: 219 10*3/MM3 (ref 140–450)
PMV BLD AUTO: 8.4 FL (ref 6–12)
POTASSIUM BLD-SCNC: 4.3 MMOL/L (ref 3.5–5.2)
PROT SERPL-MCNC: 7.9 G/DL (ref 6–8.5)
PROT UR QL STRIP: NEGATIVE
PROTHROMBIN TIME: 14.7 SECONDS (ref 11–15.4)
RBC # BLD AUTO: 4.01 10*6/MM3 (ref 3.77–5.28)
RBC # UR: ABNORMAL /HPF
REF LAB TEST METHOD: ABNORMAL
SODIUM BLD-SCNC: 138 MMOL/L (ref 136–145)
SP GR UR STRIP: 1.02 (ref 1–1.03)
SQUAMOUS #/AREA URNS HPF: ABNORMAL /HPF
TROPONIN T SERPL-MCNC: <0.01 NG/ML (ref 0–0.03)
UROBILINOGEN UR QL STRIP: ABNORMAL
WBC NRBC COR # BLD: 4.13 10*3/MM3 (ref 3.4–10.8)
WBC UR QL AUTO: ABNORMAL /HPF

## 2019-09-22 PROCEDURE — 84484 ASSAY OF TROPONIN QUANT: CPT | Performed by: EMERGENCY MEDICINE

## 2019-09-22 PROCEDURE — 93010 ELECTROCARDIOGRAM REPORT: CPT | Performed by: INTERNAL MEDICINE

## 2019-09-22 PROCEDURE — 25010000002 LORAZEPAM PER 2 MG: Performed by: EMERGENCY MEDICINE

## 2019-09-22 PROCEDURE — 96376 TX/PRO/DX INJ SAME DRUG ADON: CPT

## 2019-09-22 PROCEDURE — 80053 COMPREHEN METABOLIC PANEL: CPT | Performed by: EMERGENCY MEDICINE

## 2019-09-22 PROCEDURE — 93005 ELECTROCARDIOGRAM TRACING: CPT | Performed by: EMERGENCY MEDICINE

## 2019-09-22 PROCEDURE — 25010000002 HYDROMORPHONE 1 MG/ML SOLUTION: Performed by: EMERGENCY MEDICINE

## 2019-09-22 PROCEDURE — 96375 TX/PRO/DX INJ NEW DRUG ADDON: CPT

## 2019-09-22 PROCEDURE — 25010000002 HYDROMORPHONE PER 4 MG: Performed by: EMERGENCY MEDICINE

## 2019-09-22 PROCEDURE — 71045 X-RAY EXAM CHEST 1 VIEW: CPT

## 2019-09-22 PROCEDURE — 84703 CHORIONIC GONADOTROPIN ASSAY: CPT | Performed by: EMERGENCY MEDICINE

## 2019-09-22 PROCEDURE — 85610 PROTHROMBIN TIME: CPT | Performed by: EMERGENCY MEDICINE

## 2019-09-22 PROCEDURE — 74176 CT ABD & PELVIS W/O CONTRAST: CPT

## 2019-09-22 PROCEDURE — 99284 EMERGENCY DEPT VISIT MOD MDM: CPT

## 2019-09-22 PROCEDURE — 81001 URINALYSIS AUTO W/SCOPE: CPT | Performed by: EMERGENCY MEDICINE

## 2019-09-22 PROCEDURE — 85379 FIBRIN DEGRADATION QUANT: CPT | Performed by: EMERGENCY MEDICINE

## 2019-09-22 PROCEDURE — 85025 COMPLETE CBC W/AUTO DIFF WBC: CPT | Performed by: EMERGENCY MEDICINE

## 2019-09-22 PROCEDURE — 25010000002 ONDANSETRON PER 1 MG: Performed by: EMERGENCY MEDICINE

## 2019-09-22 PROCEDURE — 96374 THER/PROPH/DIAG INJ IV PUSH: CPT

## 2019-09-22 PROCEDURE — 25010000002 ORPHENADRINE CITRATE PER 60 MG: Performed by: EMERGENCY MEDICINE

## 2019-09-22 RX ORDER — HYDROMORPHONE HYDROCHLORIDE 1 MG/ML
0.5 INJECTION, SOLUTION INTRAMUSCULAR; INTRAVENOUS; SUBCUTANEOUS ONCE
Status: COMPLETED | OUTPATIENT
Start: 2019-09-22 | End: 2019-09-22

## 2019-09-22 RX ORDER — ORPHENADRINE CITRATE 30 MG/ML
60 INJECTION INTRAMUSCULAR; INTRAVENOUS ONCE
Status: COMPLETED | OUTPATIENT
Start: 2019-09-22 | End: 2019-09-22

## 2019-09-22 RX ORDER — SODIUM CHLORIDE 0.9 % (FLUSH) 0.9 %
10 SYRINGE (ML) INJECTION AS NEEDED
Status: DISCONTINUED | OUTPATIENT
Start: 2019-09-22 | End: 2019-09-23 | Stop reason: HOSPADM

## 2019-09-22 RX ORDER — ONDANSETRON 2 MG/ML
4 INJECTION INTRAMUSCULAR; INTRAVENOUS ONCE
Status: COMPLETED | OUTPATIENT
Start: 2019-09-22 | End: 2019-09-22

## 2019-09-22 RX ORDER — LORAZEPAM 2 MG/ML
1 INJECTION INTRAMUSCULAR ONCE
Status: COMPLETED | OUTPATIENT
Start: 2019-09-22 | End: 2019-09-22

## 2019-09-22 RX ADMIN — SODIUM CHLORIDE 1000 ML: 9 INJECTION, SOLUTION INTRAVENOUS at 20:35

## 2019-09-22 RX ADMIN — ONDANSETRON 4 MG: 2 INJECTION, SOLUTION INTRAMUSCULAR; INTRAVENOUS at 20:36

## 2019-09-22 RX ADMIN — ORPHENADRINE CITRATE 60 MG: 30 INJECTION INTRAMUSCULAR; INTRAVENOUS at 20:38

## 2019-09-22 RX ADMIN — HYDROMORPHONE HYDROCHLORIDE 1 MG: 1 INJECTION, SOLUTION INTRAMUSCULAR; INTRAVENOUS; SUBCUTANEOUS at 22:18

## 2019-09-22 RX ADMIN — LORAZEPAM 1 MG: 2 INJECTION INTRAMUSCULAR; INTRAVENOUS at 20:40

## 2019-09-22 RX ADMIN — HYDROMORPHONE HYDROCHLORIDE 0.5 MG: 1 INJECTION, SOLUTION INTRAMUSCULAR; INTRAVENOUS; SUBCUTANEOUS at 23:55

## 2019-09-23 RX ORDER — NITROFURANTOIN 25; 75 MG/1; MG/1
100 CAPSULE ORAL 2 TIMES DAILY
Qty: 20 CAPSULE | Refills: 0 | OUTPATIENT
Start: 2019-09-23 | End: 2020-07-05

## 2019-09-23 RX ORDER — ORPHENADRINE CITRATE 100 MG/1
100 TABLET, EXTENDED RELEASE ORAL 2 TIMES DAILY PRN
Qty: 20 TABLET | Refills: 0 | Status: SHIPPED | OUTPATIENT
Start: 2019-09-23 | End: 2021-01-20

## 2019-09-23 RX ORDER — ACETAMINOPHEN 500 MG
1000 TABLET ORAL ONCE
Status: COMPLETED | OUTPATIENT
Start: 2019-09-23 | End: 2019-09-23

## 2019-09-23 RX ADMIN — ACETAMINOPHEN 1000 MG: 500 TABLET ORAL at 01:04

## 2019-09-23 NOTE — ED PROVIDER NOTES
Subjective   34-year-old female in the emergency department complaining of shortness of breath, chest pain, and pain down her right flank into her right abdomen.  Patient has had this chronic pain for several months now and has been evaluated several times in the emergency department with no acute abnormalities noted.  Patient denies chest pain during my evaluation at bedside.  Her main complaint is right flank pain that radiates into her right abdomen and down her leg.  Denies nausea, vomiting, or diarrhea.  Has significant past medical history of asthma, depression, diabetes mellitus, DVT, factor V Leiden, hyperlipidemia, ovarian cyst, and kidney stones.        History provided by:  Patient   used: No    Shortness of Breath   Severity:  Mild  Onset quality:  Gradual  Timing:  Intermittent  Progression:  Worsening  Chronicity:  Chronic  Context: activity    Context: not animal exposure, not emotional upset, not fumes, not known allergens, not occupational exposure, not pollens, not smoke exposure, not strong odors, not URI and not weather changes    Relieved by:  Nothing  Worsened by:  Activity, coughing, deep breathing, exertion, movement and stress  Ineffective treatments:  None tried  Associated symptoms: chest pain    Associated symptoms: no abdominal pain, no claudication, no cough, no diaphoresis, no ear pain, no fever, no headaches, no hemoptysis, no neck pain, no PND, no rash, no sore throat, no sputum production, no syncope, no swollen glands, no vomiting and no wheezing    Chest pain:     Quality: aching      Severity:  Mild    Onset quality:  Gradual    Timing:  Sporadic    Progression:  Worsening    Chronicity:  Chronic  Risk factors: hx of PE/DVT and obesity    Risk factors: no recent alcohol use, no family hx of DVT, no hx of cancer, no oral contraceptive use, no prolonged immobilization, no recent surgery and no tobacco use        Review of Systems   Constitutional: Negative for  diaphoresis and fever.   HENT: Negative for ear pain and sore throat.    Respiratory: Positive for shortness of breath. Negative for cough, hemoptysis, sputum production and wheezing.    Cardiovascular: Positive for chest pain. Negative for claudication, syncope and PND.   Gastrointestinal: Negative for abdominal pain and vomiting.   Musculoskeletal: Negative for neck pain.   Skin: Negative for rash.   Neurological: Negative for headaches.   All other systems reviewed and are negative.      Past Medical History:   Diagnosis Date   • Abnormal ECG    • Anemia    • Anxiety    • Asthma    • Depression    • Diabetes mellitus (CMS/HCC)    • DVT (deep venous thrombosis) (CMS/HCC)    • Factor 5 Leiden mutation, heterozygous (CMS/HCC)    • Fibroid    • GERD (gastroesophageal reflux disease)    • Gestational diabetes    • Gout    • Hyperlipidemia    • Hypothyroid    • Kidney stone    • Neuropathy    • Ovarian cyst    • PE (pulmonary embolism)    • Polycystic ovary syndrome    • Preeclampsia    • Rh incompatibility    • Urinary tract infection    • Varicella        Allergies   Allergen Reactions   • Amoxicillin Hives   • Penicillins Hives   • Toradol [Ketorolac Tromethamine] Hives   • Tramadol Swelling       Past Surgical History:   Procedure Laterality Date   • CARDIAC SURGERY      Heart Cath done in    •  SECTION     • CHOLECYSTECTOMY     • COLONOSCOPY         Family History   Problem Relation Age of Onset   • No Known Problems Mother    • No Known Problems Father    • No Known Problems Sister    • No Known Problems Brother    • No Known Problems Son    • No Known Problems Daughter    • No Known Problems Maternal Grandmother    • No Known Problems Maternal Grandfather    • No Known Problems Paternal Grandmother    • No Known Problems Paternal Grandfather    • No Known Problems Cousin    • Rheum arthritis Neg Hx    • Osteoarthritis Neg Hx    • Asthma Neg Hx    • Diabetes Neg Hx    • Heart failure Neg Hx    •  "Hyperlipidemia Neg Hx    • Hypertension Neg Hx    • Migraines Neg Hx    • Rashes / Skin problems Neg Hx    • Seizures Neg Hx    • Stroke Neg Hx    • Thyroid disease Neg Hx        Social History     Socioeconomic History   • Marital status:      Spouse name: Not on file   • Number of children: Not on file   • Years of education: Not on file   • Highest education level: Not on file   Tobacco Use   • Smoking status: Never Smoker   • Smokeless tobacco: Never Used   Substance and Sexual Activity   • Alcohol use: No   • Drug use: Yes     Comment: Pt has track marks on right arm. Pt states \"It's from my INR being drawn.\"    • Sexual activity: Defer           Objective   Physical Exam   Constitutional: She is oriented to person, place, and time. She appears well-developed and well-nourished.  Non-toxic appearance. No distress.   HENT:   Head: Normocephalic and atraumatic.   Right Ear: External ear normal.   Left Ear: External ear normal.   Nose: Nose normal.   Mouth/Throat: Oropharynx is clear and moist and mucous membranes are normal. No oropharyngeal exudate. No tonsillar exudate.   Eyes: Conjunctivae, EOM and lids are normal. Pupils are equal, round, and reactive to light.   Neck: Normal range of motion and full passive range of motion without pain. Neck supple. No thyromegaly present.   Cardiovascular: Normal rate, regular rhythm, S1 normal, S2 normal, normal heart sounds, intact distal pulses and normal pulses.   Pulmonary/Chest: Effort normal. No tachypnea. No respiratory distress. She has decreased breath sounds. She has no wheezes. She has no rales. She exhibits no tenderness.   Abdominal: Soft. Normal appearance and bowel sounds are normal. She exhibits no distension. There is no tenderness. There is no rebound and no guarding.   Musculoskeletal: Normal range of motion. She exhibits no edema, tenderness or deformity.   Lymphadenopathy:     She has no cervical adenopathy.   Neurological: She is alert and " oriented to person, place, and time. She has normal strength. No cranial nerve deficit or sensory deficit. GCS eye subscore is 4. GCS verbal subscore is 5. GCS motor subscore is 6.   Skin: Skin is warm, dry and intact. No rash noted. She is not diaphoretic. No erythema. No pallor.   Psychiatric: She has a normal mood and affect. Her speech is normal and behavior is normal. Judgment and thought content normal. Cognition and memory are normal.   Nursing note and vitals reviewed.      Procedures           ED Course  ED Course as of Sep 23 0052   Sun Sep 22, 2019   2352 Impression    No acute cardiopulmonary findings.     XR Chest 1 View [ES]   2353 Impression      1. No clearly acute process in the abdomen or pelvis. Normal appendix.  2. Nonobstructing left renal stone.  3. Redemonstration of a prominent/enlarged right ovary measuring up to about 6 cm, unchanged. Prior ultrasound raised the possibility of ovarian teratoma. Correlate clinically.  4. Hepatosplenomegaly and hepatic steatosis.  5. Surgical absence of the gallbladder.     CT Abdomen Pelvis Without Contrast [ES]   Mon Sep 23, 2019   0019 Normal sinus rhythm  Cannot rule out Anterior infarct , age undetermined  Abnormal ECG  When compared with ECG of 15-APR-2019 18:49,  No significant change was found ECG 12 Lead [ES]   0019 Vent. rate 92 BPM  RI interval 198 ms  QRS duration 88 ms  QT/QTc 370/457 ms  P-R-T axes 29 25 29  Normal sinus rhythm  Cannot rule out Anterior infarct , age undetermined  Abnormal ECG  When compared with ECG of 15-APR-2019 18:49,  No significant change was found ECG 12 Lead [ES]   0046 Patient reports feeling better after being treated in the ED, and will return to ED with any worsening symptoms.  [ES]      ED Course User Index  [ES] Martir Razo MD                  MDM  Number of Diagnoses or Management Options     Amount and/or Complexity of Data Reviewed  Clinical lab tests: reviewed and ordered  Tests in the radiology  section of CPT®: reviewed and ordered  Tests in the medicine section of CPT®: reviewed and ordered  Discussion of test results with the performing providers: yes  Decide to obtain previous medical records or to obtain history from someone other than the patient: yes  Obtain history from someone other than the patient: yes  Review and summarize past medical records: yes  Discuss the patient with other providers: yes  Independent visualization of images, tracings, or specimens: yes    Risk of Complications, Morbidity, and/or Mortality  Presenting problems: moderate  Diagnostic procedures: moderate  Management options: moderate    Patient Progress  Patient progress: stable      Final diagnoses:   Kidney stone   Cyst of right ovary   Acute cystitis with hematuria   Flank pain   Nonspecific chest pain   Shortness of breath              Martir Razo MD  09/23/19 0052

## 2019-09-25 VITALS
HEART RATE: 92 BPM | SYSTOLIC BLOOD PRESSURE: 142 MMHG | HEIGHT: 64 IN | RESPIRATION RATE: 20 BRPM | WEIGHT: 293 LBS | BODY MASS INDEX: 50.02 KG/M2 | TEMPERATURE: 98.4 F | DIASTOLIC BLOOD PRESSURE: 70 MMHG | OXYGEN SATURATION: 94 %

## 2019-11-25 ENCOUNTER — APPOINTMENT (OUTPATIENT)
Dept: GENERAL RADIOLOGY | Facility: HOSPITAL | Age: 34
End: 2019-11-25

## 2019-11-25 ENCOUNTER — APPOINTMENT (OUTPATIENT)
Dept: CT IMAGING | Facility: HOSPITAL | Age: 34
End: 2019-11-25

## 2019-11-25 ENCOUNTER — HOSPITAL ENCOUNTER (EMERGENCY)
Facility: HOSPITAL | Age: 34
Discharge: HOME | End: 2019-11-26
Attending: EMERGENCY MEDICINE | Admitting: EMERGENCY MEDICINE

## 2019-11-25 VITALS
HEART RATE: 86 BPM | TEMPERATURE: 98.9 F | HEIGHT: 64 IN | RESPIRATION RATE: 20 BRPM | BODY MASS INDEX: 50.02 KG/M2 | DIASTOLIC BLOOD PRESSURE: 78 MMHG | OXYGEN SATURATION: 96 % | WEIGHT: 293 LBS | SYSTOLIC BLOOD PRESSURE: 166 MMHG

## 2019-11-25 DIAGNOSIS — R07.89 ATYPICAL CHEST PAIN: Primary | ICD-10-CM

## 2019-11-25 DIAGNOSIS — I10 UNCONTROLLED HYPERTENSION: ICD-10-CM

## 2019-11-25 DIAGNOSIS — M79.652 LEFT THIGH PAIN: ICD-10-CM

## 2019-11-25 LAB
6-ACETYL MORPHINE: NEGATIVE
A-A DO2: 21.1 MMHG (ref 0–300)
ALBUMIN SERPL-MCNC: 4.26 G/DL (ref 3.5–5.2)
ALBUMIN/GLOB SERPL: 1.1 G/DL
ALP SERPL-CCNC: 98 U/L (ref 39–117)
ALT SERPL W P-5'-P-CCNC: 33 U/L (ref 1–33)
AMPHET+METHAMPHET UR QL: NEGATIVE
AMYLASE SERPL-CCNC: 24 U/L (ref 28–100)
ANION GAP SERPL CALCULATED.3IONS-SCNC: 19.4 MMOL/L (ref 5–15)
APTT PPP: 42.2 SECONDS (ref 23.8–36.1)
ARTERIAL PATENCY WRIST A: POSITIVE
AST SERPL-CCNC: 46 U/L (ref 1–32)
ATMOSPHERIC PRESS: 724 MMHG
BACTERIA UR QL AUTO: ABNORMAL /HPF
BARBITURATES UR QL SCN: NEGATIVE
BASE EXCESS BLDA CALC-SCNC: -2.7 MMOL/L (ref 0–2)
BDY SITE: ABNORMAL
BENZODIAZ UR QL SCN: NEGATIVE
BILIRUB SERPL-MCNC: 0.3 MG/DL (ref 0.2–1.2)
BILIRUB UR QL STRIP: NEGATIVE
BODY TEMPERATURE: 0 C
BUN BLD-MCNC: 5 MG/DL (ref 6–20)
BUN/CREAT SERPL: 10.6 (ref 7–25)
BUPRENORPHINE SERPL-MCNC: NEGATIVE NG/ML
CALCIUM SPEC-SCNC: 10.4 MG/DL (ref 8.6–10.5)
CANNABINOIDS SERPL QL: NEGATIVE
CHLORIDE SERPL-SCNC: 99 MMOL/L (ref 98–107)
CLARITY UR: CLEAR
CO2 BLDA-SCNC: 22.5 MMOL/L (ref 22–33)
CO2 SERPL-SCNC: 18.6 MMOL/L (ref 22–29)
COCAINE UR QL: NEGATIVE
COHGB MFR BLD: 1.3 % (ref 0–5)
COLOR UR: YELLOW
CREAT BLD-MCNC: 0.47 MG/DL (ref 0.57–1)
CRP SERPL-MCNC: 2.53 MG/DL (ref 0–0.5)
D DIMER PPP FEU-MCNC: <0.27 MCGFEU/ML (ref 0–0.5)
D-LACTATE SERPL-SCNC: 3 MMOL/L (ref 0.5–2)
D-LACTATE SERPL-SCNC: 3.9 MMOL/L (ref 0.5–2)
DEPRECATED RDW RBC AUTO: 46.4 FL (ref 37–54)
EOSINOPHIL # BLD MANUAL: 0.05 10*3/MM3 (ref 0–0.4)
EOSINOPHIL NFR BLD MANUAL: 1 % (ref 0.3–6.2)
ERYTHROCYTE [DISTWIDTH] IN BLOOD BY AUTOMATED COUNT: 14.6 % (ref 12.3–15.4)
FLUAV AG NPH QL: NEGATIVE
FLUBV AG NPH QL IA: NEGATIVE
GFR SERPL CREATININE-BSD FRML MDRD: >150 ML/MIN/1.73
GLOBULIN UR ELPH-MCNC: 4 GM/DL
GLUCOSE BLD-MCNC: 225 MG/DL (ref 65–99)
GLUCOSE UR STRIP-MCNC: NEGATIVE MG/DL
HCG SERPL QL: NEGATIVE
HCO3 BLDA-SCNC: 21.4 MMOL/L (ref 20–26)
HCT VFR BLD AUTO: 38.7 % (ref 34–46.6)
HCT VFR BLD CALC: 38 % (ref 38–51)
HGB BLD-MCNC: 12.9 G/DL (ref 12–15.9)
HGB BLDA-MCNC: 12.4 G/DL (ref 13.5–17.5)
HGB UR QL STRIP.AUTO: ABNORMAL
HOLD SPECIMEN: NORMAL
HOROWITZ INDEX BLD+IHG-RTO: 21 %
HYALINE CASTS UR QL AUTO: ABNORMAL /LPF
INR PPP: 2.19 (ref 0.9–1.1)
KETONES UR QL STRIP: ABNORMAL
LEUKOCYTE ESTERASE UR QL STRIP.AUTO: ABNORMAL
LIPASE SERPL-CCNC: 24 U/L (ref 13–60)
LYMPHOCYTES # BLD MANUAL: 0.98 10*3/MM3 (ref 0.7–3.1)
LYMPHOCYTES NFR BLD MANUAL: 11 % (ref 5–12)
LYMPHOCYTES NFR BLD MANUAL: 19 % (ref 19.6–45.3)
Lab: ABNORMAL
MAGNESIUM SERPL-MCNC: 1.4 MG/DL (ref 1.6–2.6)
MCH RBC QN AUTO: 29.7 PG (ref 26.6–33)
MCHC RBC AUTO-ENTMCNC: 33.3 G/DL (ref 31.5–35.7)
MCV RBC AUTO: 89.2 FL (ref 79–97)
METAMYELOCYTES NFR BLD MANUAL: 1 % (ref 0–0)
METHADONE UR QL SCN: NEGATIVE
METHGB BLD QL: 0.2 % (ref 0–3)
MODALITY: ABNORMAL
MONOCYTES # BLD AUTO: 0.57 10*3/MM3 (ref 0.1–0.9)
NEUTROPHILS # BLD AUTO: 3.52 10*3/MM3 (ref 1.7–7)
NEUTROPHILS NFR BLD MANUAL: 68 % (ref 42.7–76)
NITRITE UR QL STRIP: NEGATIVE
NOTE: ABNORMAL
OPIATES UR QL: NEGATIVE
OXYCODONE UR QL SCN: POSITIVE
OXYHGB MFR BLDV: 95.2 % (ref 94–99)
PCO2 BLDA: 34.3 MM HG (ref 35–45)
PCO2 TEMP ADJ BLD: ABNORMAL MM[HG]
PCP UR QL SCN: NEGATIVE
PH BLDA: 7.4 PH UNITS (ref 7.35–7.45)
PH UR STRIP.AUTO: 5.5 [PH] (ref 5–8)
PH, TEMP CORRECTED: ABNORMAL
PLAT MORPH BLD: NORMAL
PLATELET # BLD AUTO: 223 10*3/MM3 (ref 140–450)
PMV BLD AUTO: 9.5 FL (ref 6–12)
PO2 BLDA: 82.2 MM HG (ref 83–108)
PO2 TEMP ADJ BLD: ABNORMAL MM[HG]
POTASSIUM BLD-SCNC: 3.9 MMOL/L (ref 3.5–5.2)
PROT SERPL-MCNC: 8.3 G/DL (ref 6–8.5)
PROT UR QL STRIP: NEGATIVE
PROTHROMBIN TIME: 25.4 SECONDS (ref 11–15.4)
RBC # BLD AUTO: 4.34 10*6/MM3 (ref 3.77–5.28)
RBC # UR: ABNORMAL /HPF
RBC MORPH BLD: NORMAL
REF LAB TEST METHOD: ABNORMAL
SAO2 % BLDCOA: 96.7 % (ref 94–99)
SCAN SLIDE: NORMAL
SODIUM BLD-SCNC: 137 MMOL/L (ref 136–145)
SP GR UR STRIP: 1.02 (ref 1–1.03)
SQUAMOUS #/AREA URNS HPF: ABNORMAL /HPF
T4 FREE SERPL-MCNC: 1.14 NG/DL (ref 0.93–1.7)
TROPONIN T SERPL-MCNC: <0.01 NG/ML (ref 0–0.03)
TSH SERPL DL<=0.05 MIU/L-ACNC: 7.38 UIU/ML (ref 0.27–4.2)
UROBILINOGEN UR QL STRIP: ABNORMAL
VENTILATOR MODE: ABNORMAL
WBC NRBC COR # BLD: 5.18 10*3/MM3 (ref 3.4–10.8)
WBC UR QL AUTO: ABNORMAL /HPF
WHOLE BLOOD HOLD SPECIMEN: NORMAL
WHOLE BLOOD HOLD SPECIMEN: NORMAL

## 2019-11-25 PROCEDURE — 71275 CT ANGIOGRAPHY CHEST: CPT | Performed by: RADIOLOGY

## 2019-11-25 PROCEDURE — 80307 DRUG TEST PRSMV CHEM ANLYZR: CPT | Performed by: EMERGENCY MEDICINE

## 2019-11-25 PROCEDURE — 82375 ASSAY CARBOXYHB QUANT: CPT

## 2019-11-25 PROCEDURE — 25010000002 MAGNESIUM SULFATE 2 GM/50ML SOLUTION: Performed by: EMERGENCY MEDICINE

## 2019-11-25 PROCEDURE — 85025 COMPLETE CBC W/AUTO DIFF WBC: CPT | Performed by: EMERGENCY MEDICINE

## 2019-11-25 PROCEDURE — 93010 ELECTROCARDIOGRAM REPORT: CPT | Performed by: INTERNAL MEDICINE

## 2019-11-25 PROCEDURE — 84484 ASSAY OF TROPONIN QUANT: CPT | Performed by: EMERGENCY MEDICINE

## 2019-11-25 PROCEDURE — 82805 BLOOD GASES W/O2 SATURATION: CPT

## 2019-11-25 PROCEDURE — 71275 CT ANGIOGRAPHY CHEST: CPT

## 2019-11-25 PROCEDURE — 85379 FIBRIN DEGRADATION QUANT: CPT | Performed by: EMERGENCY MEDICINE

## 2019-11-25 PROCEDURE — 96361 HYDRATE IV INFUSION ADD-ON: CPT

## 2019-11-25 PROCEDURE — 82150 ASSAY OF AMYLASE: CPT | Performed by: EMERGENCY MEDICINE

## 2019-11-25 PROCEDURE — 36415 COLL VENOUS BLD VENIPUNCTURE: CPT

## 2019-11-25 PROCEDURE — 83050 HGB METHEMOGLOBIN QUAN: CPT

## 2019-11-25 PROCEDURE — 83605 ASSAY OF LACTIC ACID: CPT | Performed by: EMERGENCY MEDICINE

## 2019-11-25 PROCEDURE — 87040 BLOOD CULTURE FOR BACTERIA: CPT | Performed by: EMERGENCY MEDICINE

## 2019-11-25 PROCEDURE — 96375 TX/PRO/DX INJ NEW DRUG ADDON: CPT

## 2019-11-25 PROCEDURE — 87804 INFLUENZA ASSAY W/OPTIC: CPT | Performed by: EMERGENCY MEDICINE

## 2019-11-25 PROCEDURE — P9612 CATHETERIZE FOR URINE SPEC: HCPCS

## 2019-11-25 PROCEDURE — 81001 URINALYSIS AUTO W/SCOPE: CPT | Performed by: EMERGENCY MEDICINE

## 2019-11-25 PROCEDURE — 0 IOVERSOL 74 % SOLUTION: Performed by: FAMILY MEDICINE

## 2019-11-25 PROCEDURE — 71045 X-RAY EXAM CHEST 1 VIEW: CPT

## 2019-11-25 PROCEDURE — 80053 COMPREHEN METABOLIC PANEL: CPT | Performed by: EMERGENCY MEDICINE

## 2019-11-25 PROCEDURE — 85730 THROMBOPLASTIN TIME PARTIAL: CPT | Performed by: EMERGENCY MEDICINE

## 2019-11-25 PROCEDURE — 36600 WITHDRAWAL OF ARTERIAL BLOOD: CPT

## 2019-11-25 PROCEDURE — 74176 CT ABD & PELVIS W/O CONTRAST: CPT | Performed by: RADIOLOGY

## 2019-11-25 PROCEDURE — 83735 ASSAY OF MAGNESIUM: CPT | Performed by: EMERGENCY MEDICINE

## 2019-11-25 PROCEDURE — 25010000002 MORPHINE PER 10 MG: Performed by: FAMILY MEDICINE

## 2019-11-25 PROCEDURE — 99285 EMERGENCY DEPT VISIT HI MDM: CPT

## 2019-11-25 PROCEDURE — 84443 ASSAY THYROID STIM HORMONE: CPT | Performed by: EMERGENCY MEDICINE

## 2019-11-25 PROCEDURE — 85007 BL SMEAR W/DIFF WBC COUNT: CPT | Performed by: EMERGENCY MEDICINE

## 2019-11-25 PROCEDURE — 84703 CHORIONIC GONADOTROPIN ASSAY: CPT | Performed by: EMERGENCY MEDICINE

## 2019-11-25 PROCEDURE — 96365 THER/PROPH/DIAG IV INF INIT: CPT

## 2019-11-25 PROCEDURE — 85610 PROTHROMBIN TIME: CPT | Performed by: EMERGENCY MEDICINE

## 2019-11-25 PROCEDURE — 74176 CT ABD & PELVIS W/O CONTRAST: CPT

## 2019-11-25 PROCEDURE — 86140 C-REACTIVE PROTEIN: CPT | Performed by: EMERGENCY MEDICINE

## 2019-11-25 PROCEDURE — 71045 X-RAY EXAM CHEST 1 VIEW: CPT | Performed by: RADIOLOGY

## 2019-11-25 PROCEDURE — 84439 ASSAY OF FREE THYROXINE: CPT | Performed by: EMERGENCY MEDICINE

## 2019-11-25 PROCEDURE — 93005 ELECTROCARDIOGRAM TRACING: CPT | Performed by: EMERGENCY MEDICINE

## 2019-11-25 PROCEDURE — 83690 ASSAY OF LIPASE: CPT | Performed by: EMERGENCY MEDICINE

## 2019-11-25 RX ORDER — SODIUM CHLORIDE 9 MG/ML
125 INJECTION, SOLUTION INTRAVENOUS CONTINUOUS
Status: DISCONTINUED | OUTPATIENT
Start: 2019-11-25 | End: 2019-11-26 | Stop reason: HOSPADM

## 2019-11-25 RX ORDER — MORPHINE SULFATE 2 MG/ML
2 INJECTION, SOLUTION INTRAMUSCULAR; INTRAVENOUS ONCE
Status: COMPLETED | OUTPATIENT
Start: 2019-11-25 | End: 2019-11-25

## 2019-11-25 RX ORDER — CLONIDINE HYDROCHLORIDE 0.1 MG/1
0.1 TABLET ORAL ONCE
Status: COMPLETED | OUTPATIENT
Start: 2019-11-25 | End: 2019-11-25

## 2019-11-25 RX ORDER — HYDROCODONE BITARTRATE AND ACETAMINOPHEN 7.5; 325 MG/1; MG/1
1 TABLET ORAL ONCE
Status: COMPLETED | OUTPATIENT
Start: 2019-11-25 | End: 2019-11-25

## 2019-11-25 RX ORDER — MAGNESIUM SULFATE HEPTAHYDRATE 40 MG/ML
2 INJECTION, SOLUTION INTRAVENOUS ONCE
Status: COMPLETED | OUTPATIENT
Start: 2019-11-25 | End: 2019-11-25

## 2019-11-25 RX ORDER — HYDRALAZINE HYDROCHLORIDE 20 MG/ML
10 INJECTION INTRAMUSCULAR; INTRAVENOUS ONCE
Status: DISCONTINUED | OUTPATIENT
Start: 2019-11-25 | End: 2019-11-26

## 2019-11-25 RX ADMIN — CLONIDINE HYDROCHLORIDE 0.1 MG: 0.1 TABLET ORAL at 19:39

## 2019-11-25 RX ADMIN — IOVERSOL 100 ML: 741 INJECTION INTRA-ARTERIAL; INTRAVENOUS at 21:06

## 2019-11-25 RX ADMIN — HYDROCODONE BITARTRATE AND ACETAMINOPHEN 1 TABLET: 7.5; 325 TABLET ORAL at 19:06

## 2019-11-25 RX ADMIN — SODIUM CHLORIDE 500 ML: 9 INJECTION, SOLUTION INTRAVENOUS at 18:09

## 2019-11-25 RX ADMIN — MORPHINE SULFATE 2 MG: 2 INJECTION, SOLUTION INTRAMUSCULAR; INTRAVENOUS at 21:23

## 2019-11-25 RX ADMIN — SODIUM CHLORIDE 125 ML/HR: 9 INJECTION, SOLUTION INTRAVENOUS at 18:09

## 2019-11-25 RX ADMIN — MAGNESIUM SULFATE IN WATER 2 G: 40 INJECTION, SOLUTION INTRAVENOUS at 19:19

## 2019-11-26 RX ORDER — HYDROCODONE BITARTRATE AND ACETAMINOPHEN 5; 325 MG/1; MG/1
1 TABLET ORAL EVERY 6 HOURS PRN
Status: DISCONTINUED | OUTPATIENT
Start: 2019-11-26 | End: 2019-11-26 | Stop reason: HOSPADM

## 2019-11-26 RX ADMIN — HYDROCODONE BITARTRATE AND ACETAMINOPHEN 1 TABLET: 5; 325 TABLET ORAL at 00:14

## 2019-11-30 LAB
BACTERIA SPEC AEROBE CULT: NORMAL
BACTERIA SPEC AEROBE CULT: NORMAL

## 2019-12-24 ENCOUNTER — HOSPITAL ENCOUNTER (EMERGENCY)
Facility: HOSPITAL | Age: 34
Discharge: HOME | End: 2019-12-24
Attending: EMERGENCY MEDICINE | Admitting: EMERGENCY MEDICINE

## 2019-12-24 ENCOUNTER — APPOINTMENT (OUTPATIENT)
Dept: CT IMAGING | Facility: HOSPITAL | Age: 34
End: 2019-12-24

## 2019-12-24 VITALS
HEART RATE: 91 BPM | HEIGHT: 64 IN | WEIGHT: 293 LBS | BODY MASS INDEX: 50.02 KG/M2 | SYSTOLIC BLOOD PRESSURE: 186 MMHG | DIASTOLIC BLOOD PRESSURE: 98 MMHG | TEMPERATURE: 98.3 F | OXYGEN SATURATION: 95 % | RESPIRATION RATE: 18 BRPM

## 2019-12-24 DIAGNOSIS — R03.0 ELEVATED BLOOD PRESSURE READING: ICD-10-CM

## 2019-12-24 DIAGNOSIS — R31.9 URINARY TRACT INFECTION WITH HEMATURIA, SITE UNSPECIFIED: ICD-10-CM

## 2019-12-24 DIAGNOSIS — R10.9 FLANK PAIN: Primary | ICD-10-CM

## 2019-12-24 DIAGNOSIS — N39.0 URINARY TRACT INFECTION WITH HEMATURIA, SITE UNSPECIFIED: ICD-10-CM

## 2019-12-24 LAB
ALBUMIN SERPL-MCNC: 4.5 G/DL (ref 3.5–5.2)
ALBUMIN/GLOB SERPL: 1.3 G/DL
ALP SERPL-CCNC: 94 U/L (ref 39–117)
ALT SERPL W P-5'-P-CCNC: 28 U/L (ref 1–33)
ANION GAP SERPL CALCULATED.3IONS-SCNC: 16.6 MMOL/L (ref 5–15)
ANISOCYTOSIS BLD QL: NORMAL
AST SERPL-CCNC: 47 U/L (ref 1–32)
B-HCG UR QL: NEGATIVE
BACTERIA UR QL AUTO: ABNORMAL /HPF
BILIRUB SERPL-MCNC: 0.3 MG/DL (ref 0.2–1.2)
BILIRUB UR QL STRIP: ABNORMAL
BUN BLD-MCNC: 8 MG/DL (ref 6–20)
BUN/CREAT SERPL: 13.6 (ref 7–25)
CALCIUM SPEC-SCNC: 9.8 MG/DL (ref 8.6–10.5)
CHLORIDE SERPL-SCNC: 99 MMOL/L (ref 98–107)
CLARITY UR: ABNORMAL
CO2 SERPL-SCNC: 23.4 MMOL/L (ref 22–29)
COD CRY URNS QL: ABNORMAL /HPF
COLOR UR: ABNORMAL
CREAT BLD-MCNC: 0.59 MG/DL (ref 0.57–1)
DEPRECATED RDW RBC AUTO: 44.4 FL (ref 37–54)
ERYTHROCYTE [DISTWIDTH] IN BLOOD BY AUTOMATED COUNT: 13.8 % (ref 12.3–15.4)
GFR SERPL CREATININE-BSD FRML MDRD: 117 ML/MIN/1.73
GLOBULIN UR ELPH-MCNC: 3.4 GM/DL
GLUCOSE BLD-MCNC: 230 MG/DL (ref 65–99)
GLUCOSE UR STRIP-MCNC: NEGATIVE MG/DL
HCT VFR BLD AUTO: 35.3 % (ref 34–46.6)
HGB BLD-MCNC: 12 G/DL (ref 12–15.9)
HGB UR QL STRIP.AUTO: ABNORMAL
HOLD SPECIMEN: NORMAL
HOLD SPECIMEN: NORMAL
HYALINE CASTS UR QL AUTO: ABNORMAL /LPF
INR PPP: 2.46 (ref 0.9–1.1)
KETONES UR QL STRIP: NEGATIVE
LEUKOCYTE ESTERASE UR QL STRIP.AUTO: ABNORMAL
LIPASE SERPL-CCNC: 32 U/L (ref 13–60)
LYMPHOCYTES # BLD MANUAL: 1.31 10*3/MM3 (ref 0.7–3.1)
LYMPHOCYTES NFR BLD MANUAL: 26 % (ref 19.6–45.3)
LYMPHOCYTES NFR BLD MANUAL: 8 % (ref 5–12)
MCH RBC QN AUTO: 30 PG (ref 26.6–33)
MCHC RBC AUTO-ENTMCNC: 34 G/DL (ref 31.5–35.7)
MCV RBC AUTO: 88.3 FL (ref 79–97)
MICROCYTES BLD QL: NORMAL
MONOCYTES # BLD AUTO: 0.4 10*3/MM3 (ref 0.1–0.9)
NEUTROPHILS # BLD AUTO: 3.23 10*3/MM3 (ref 1.7–7)
NEUTROPHILS NFR BLD MANUAL: 64 % (ref 42.7–76)
NITRITE UR QL STRIP: NEGATIVE
PH UR STRIP.AUTO: 5.5 [PH] (ref 5–8)
PLATELET # BLD AUTO: 204 10*3/MM3 (ref 140–450)
PMV BLD AUTO: 9.3 FL (ref 6–12)
POLYCHROMASIA BLD QL SMEAR: NORMAL
POTASSIUM BLD-SCNC: 3.8 MMOL/L (ref 3.5–5.2)
PROT SERPL-MCNC: 7.9 G/DL (ref 6–8.5)
PROT UR QL STRIP: ABNORMAL
PROTHROMBIN TIME: 27.3 SECONDS (ref 12–15.1)
RBC # BLD AUTO: 4 10*6/MM3 (ref 3.77–5.28)
RBC # UR: ABNORMAL /HPF
REF LAB TEST METHOD: ABNORMAL
SCAN SLIDE: NORMAL
SMALL PLATELETS BLD QL SMEAR: ADEQUATE
SODIUM BLD-SCNC: 139 MMOL/L (ref 136–145)
SP GR UR STRIP: >=1.03 (ref 1–1.03)
SQUAMOUS #/AREA URNS HPF: ABNORMAL /HPF
UROBILINOGEN UR QL STRIP: ABNORMAL
VARIANT LYMPHS NFR BLD MANUAL: 2 % (ref 0–5)
WBC MORPH BLD: NORMAL
WBC NRBC COR # BLD: 5.05 10*3/MM3 (ref 3.4–10.8)
WBC UR QL AUTO: ABNORMAL /HPF
WHOLE BLOOD HOLD SPECIMEN: NORMAL
WHOLE BLOOD HOLD SPECIMEN: NORMAL

## 2019-12-24 PROCEDURE — 96374 THER/PROPH/DIAG INJ IV PUSH: CPT

## 2019-12-24 PROCEDURE — 83690 ASSAY OF LIPASE: CPT | Performed by: EMERGENCY MEDICINE

## 2019-12-24 PROCEDURE — 80053 COMPREHEN METABOLIC PANEL: CPT | Performed by: EMERGENCY MEDICINE

## 2019-12-24 PROCEDURE — 85610 PROTHROMBIN TIME: CPT | Performed by: EMERGENCY MEDICINE

## 2019-12-24 PROCEDURE — 85025 COMPLETE CBC W/AUTO DIFF WBC: CPT | Performed by: EMERGENCY MEDICINE

## 2019-12-24 PROCEDURE — 96375 TX/PRO/DX INJ NEW DRUG ADDON: CPT

## 2019-12-24 PROCEDURE — 99284 EMERGENCY DEPT VISIT MOD MDM: CPT

## 2019-12-24 PROCEDURE — 81025 URINE PREGNANCY TEST: CPT | Performed by: EMERGENCY MEDICINE

## 2019-12-24 PROCEDURE — 25010000002 PROMETHAZINE PER 50 MG: Performed by: EMERGENCY MEDICINE

## 2019-12-24 PROCEDURE — 81001 URINALYSIS AUTO W/SCOPE: CPT | Performed by: EMERGENCY MEDICINE

## 2019-12-24 PROCEDURE — 25010000002 MORPHINE PER 10 MG: Performed by: EMERGENCY MEDICINE

## 2019-12-24 PROCEDURE — 74176 CT ABD & PELVIS W/O CONTRAST: CPT

## 2019-12-24 PROCEDURE — 85007 BL SMEAR W/DIFF WBC COUNT: CPT | Performed by: EMERGENCY MEDICINE

## 2019-12-24 RX ORDER — MORPHINE SULFATE 4 MG/ML
4 INJECTION, SOLUTION INTRAMUSCULAR; INTRAVENOUS ONCE
Status: COMPLETED | OUTPATIENT
Start: 2019-12-24 | End: 2019-12-24

## 2019-12-24 RX ORDER — PROMETHAZINE HYDROCHLORIDE 25 MG/ML
12.5 INJECTION, SOLUTION INTRAMUSCULAR; INTRAVENOUS ONCE
Status: COMPLETED | OUTPATIENT
Start: 2019-12-24 | End: 2019-12-24

## 2019-12-24 RX ORDER — SODIUM CHLORIDE 0.9 % (FLUSH) 0.9 %
10 SYRINGE (ML) INJECTION AS NEEDED
Status: DISCONTINUED | OUTPATIENT
Start: 2019-12-24 | End: 2019-12-25 | Stop reason: HOSPADM

## 2019-12-24 RX ORDER — NITROFURANTOIN 25; 75 MG/1; MG/1
100 CAPSULE ORAL 2 TIMES DAILY
Qty: 14 CAPSULE | Refills: 0 | OUTPATIENT
Start: 2019-12-24 | End: 2020-07-05

## 2019-12-24 RX ORDER — NITROFURANTOIN 25; 75 MG/1; MG/1
100 CAPSULE ORAL ONCE
Status: COMPLETED | OUTPATIENT
Start: 2019-12-24 | End: 2019-12-24

## 2019-12-24 RX ORDER — HYDROCODONE BITARTRATE AND ACETAMINOPHEN 5; 325 MG/1; MG/1
1 TABLET ORAL ONCE
Status: COMPLETED | OUTPATIENT
Start: 2019-12-24 | End: 2019-12-24

## 2019-12-24 RX ORDER — PHENAZOPYRIDINE HYDROCHLORIDE 200 MG/1
200 TABLET, FILM COATED ORAL 3 TIMES DAILY PRN
Qty: 6 TABLET | Refills: 0 | Status: SHIPPED | OUTPATIENT
Start: 2019-12-24 | End: 2021-01-20

## 2019-12-24 RX ADMIN — MORPHINE SULFATE 4 MG: 4 INJECTION INTRAVENOUS at 21:29

## 2019-12-24 RX ADMIN — PROMETHAZINE HYDROCHLORIDE 12.5 MG: 25 INJECTION INTRAMUSCULAR; INTRAVENOUS at 21:26

## 2019-12-24 RX ADMIN — NITROFURANTOIN MONOHYDRATE/MACROCRYSTALLINE 100 MG: 25; 75 CAPSULE ORAL at 23:31

## 2019-12-24 RX ADMIN — HYDROCODONE BITARTRATE AND ACETAMINOPHEN 1 TABLET: 5; 325 TABLET ORAL at 23:31

## 2020-02-13 ENCOUNTER — HOSPITAL ENCOUNTER (EMERGENCY)
Facility: HOSPITAL | Age: 35
Discharge: HOME | End: 2020-02-13
Attending: EMERGENCY MEDICINE | Admitting: EMERGENCY MEDICINE

## 2020-02-13 VITALS
RESPIRATION RATE: 18 BRPM | BODY MASS INDEX: 37.56 KG/M2 | SYSTOLIC BLOOD PRESSURE: 166 MMHG | HEART RATE: 109 BPM | DIASTOLIC BLOOD PRESSURE: 107 MMHG | OXYGEN SATURATION: 94 % | TEMPERATURE: 97.8 F | WEIGHT: 220 LBS | HEIGHT: 64 IN

## 2020-02-13 DIAGNOSIS — R73.9 HYPERGLYCEMIA: ICD-10-CM

## 2020-02-13 DIAGNOSIS — I10 HYPERTENSION, UNSPECIFIED TYPE: Primary | ICD-10-CM

## 2020-02-13 DIAGNOSIS — N23 RENAL COLIC ON LEFT SIDE: ICD-10-CM

## 2020-02-13 LAB
6-ACETYL MORPHINE: NEGATIVE
ALBUMIN SERPL-MCNC: 4.2 G/DL (ref 3.5–5.2)
ALBUMIN/GLOB SERPL: 1.1 G/DL
ALP SERPL-CCNC: 94 U/L (ref 39–117)
ALT SERPL W P-5'-P-CCNC: 38 U/L (ref 1–33)
AMPHET+METHAMPHET UR QL: NEGATIVE
ANION GAP SERPL CALCULATED.3IONS-SCNC: 17.7 MMOL/L (ref 5–15)
AST SERPL-CCNC: 56 U/L (ref 1–32)
BACTERIA UR QL AUTO: ABNORMAL /HPF
BARBITURATES UR QL SCN: NEGATIVE
BASOPHILS # BLD AUTO: 0.02 10*3/MM3 (ref 0–0.2)
BASOPHILS NFR BLD AUTO: 0.4 % (ref 0–1.5)
BENZODIAZ UR QL SCN: NEGATIVE
BILIRUB SERPL-MCNC: 0.4 MG/DL (ref 0.2–1.2)
BILIRUB UR QL STRIP: NEGATIVE
BUN BLD-MCNC: 11 MG/DL (ref 6–20)
BUN/CREAT SERPL: 18.3 (ref 7–25)
BUPRENORPHINE SERPL-MCNC: NEGATIVE NG/ML
CALCIUM SPEC-SCNC: 9.8 MG/DL (ref 8.6–10.5)
CANNABINOIDS SERPL QL: NEGATIVE
CHLORIDE SERPL-SCNC: 101 MMOL/L (ref 98–107)
CLARITY UR: CLEAR
CO2 SERPL-SCNC: 20.3 MMOL/L (ref 22–29)
COCAINE UR QL: NEGATIVE
COD CRY URNS QL: ABNORMAL /HPF
COLOR UR: YELLOW
CREAT BLD-MCNC: 0.6 MG/DL (ref 0.57–1)
DEPRECATED RDW RBC AUTO: 48 FL (ref 37–54)
EOSINOPHIL # BLD AUTO: 0.11 10*3/MM3 (ref 0–0.4)
EOSINOPHIL NFR BLD AUTO: 1.9 % (ref 0.3–6.2)
ERYTHROCYTE [DISTWIDTH] IN BLOOD BY AUTOMATED COUNT: 14.4 % (ref 12.3–15.4)
GFR SERPL CREATININE-BSD FRML MDRD: 115 ML/MIN/1.73
GLOBULIN UR ELPH-MCNC: 3.8 GM/DL
GLUCOSE BLD-MCNC: 424 MG/DL (ref 65–99)
GLUCOSE BLDC GLUCOMTR-MCNC: 376 MG/DL (ref 70–130)
GLUCOSE UR STRIP-MCNC: ABNORMAL MG/DL
HCT VFR BLD AUTO: 39.2 % (ref 34–46.6)
HGB BLD-MCNC: 12.7 G/DL (ref 12–15.9)
HGB UR QL STRIP.AUTO: ABNORMAL
HYALINE CASTS UR QL AUTO: ABNORMAL /LPF
IMM GRANULOCYTES # BLD AUTO: 0.02 10*3/MM3 (ref 0–0.05)
IMM GRANULOCYTES NFR BLD AUTO: 0.4 % (ref 0–0.5)
KETONES UR QL STRIP: ABNORMAL
LEUKOCYTE ESTERASE UR QL STRIP.AUTO: NEGATIVE
LYMPHOCYTES # BLD AUTO: 1.16 10*3/MM3 (ref 0.7–3.1)
LYMPHOCYTES NFR BLD AUTO: 20.5 % (ref 19.6–45.3)
MCH RBC QN AUTO: 29.8 PG (ref 26.6–33)
MCHC RBC AUTO-ENTMCNC: 32.4 G/DL (ref 31.5–35.7)
MCV RBC AUTO: 92 FL (ref 79–97)
METHADONE UR QL SCN: NEGATIVE
MONOCYTES # BLD AUTO: 0.54 10*3/MM3 (ref 0.1–0.9)
MONOCYTES NFR BLD AUTO: 9.5 % (ref 5–12)
NEUTROPHILS # BLD AUTO: 3.82 10*3/MM3 (ref 1.7–7)
NEUTROPHILS NFR BLD AUTO: 67.3 % (ref 42.7–76)
NITRITE UR QL STRIP: NEGATIVE
NRBC BLD AUTO-RTO: 0 /100 WBC (ref 0–0.2)
OPIATES UR QL: NEGATIVE
OXYCODONE UR QL SCN: NEGATIVE
PCP UR QL SCN: NEGATIVE
PH UR STRIP.AUTO: <=5 [PH] (ref 5–8)
PLATELET # BLD AUTO: 216 10*3/MM3 (ref 140–450)
PMV BLD AUTO: 9.9 FL (ref 6–12)
POTASSIUM BLD-SCNC: 4.2 MMOL/L (ref 3.5–5.2)
PROT SERPL-MCNC: 8 G/DL (ref 6–8.5)
PROT UR QL STRIP: ABNORMAL
RBC # BLD AUTO: 4.26 10*6/MM3 (ref 3.77–5.28)
RBC # UR: ABNORMAL /HPF
REF LAB TEST METHOD: ABNORMAL
SODIUM BLD-SCNC: 139 MMOL/L (ref 136–145)
SP GR UR STRIP: >1.03 (ref 1–1.03)
SQUAMOUS #/AREA URNS HPF: ABNORMAL /HPF
UROBILINOGEN UR QL STRIP: ABNORMAL
WBC NRBC COR # BLD: 5.67 10*3/MM3 (ref 3.4–10.8)
WBC UR QL AUTO: ABNORMAL /HPF
YEAST URNS QL MICRO: ABNORMAL /HPF

## 2020-02-13 PROCEDURE — 96374 THER/PROPH/DIAG INJ IV PUSH: CPT

## 2020-02-13 PROCEDURE — 25010000002 MORPHINE PER 10 MG: Performed by: EMERGENCY MEDICINE

## 2020-02-13 PROCEDURE — 80307 DRUG TEST PRSMV CHEM ANLYZR: CPT | Performed by: PHYSICIAN ASSISTANT

## 2020-02-13 PROCEDURE — 81001 URINALYSIS AUTO W/SCOPE: CPT | Performed by: PHYSICIAN ASSISTANT

## 2020-02-13 PROCEDURE — 96376 TX/PRO/DX INJ SAME DRUG ADON: CPT

## 2020-02-13 PROCEDURE — 85025 COMPLETE CBC W/AUTO DIFF WBC: CPT | Performed by: PHYSICIAN ASSISTANT

## 2020-02-13 PROCEDURE — 96375 TX/PRO/DX INJ NEW DRUG ADDON: CPT

## 2020-02-13 PROCEDURE — 25010000002 ONDANSETRON PER 1 MG: Performed by: EMERGENCY MEDICINE

## 2020-02-13 PROCEDURE — 80053 COMPREHEN METABOLIC PANEL: CPT | Performed by: PHYSICIAN ASSISTANT

## 2020-02-13 PROCEDURE — 63710000001 INSULIN REGULAR HUMAN PER 5 UNITS: Performed by: PHYSICIAN ASSISTANT

## 2020-02-13 PROCEDURE — 99284 EMERGENCY DEPT VISIT MOD MDM: CPT

## 2020-02-13 PROCEDURE — 82962 GLUCOSE BLOOD TEST: CPT

## 2020-02-13 RX ORDER — MORPHINE SULFATE 2 MG/ML
2 INJECTION, SOLUTION INTRAMUSCULAR; INTRAVENOUS ONCE
Status: COMPLETED | OUTPATIENT
Start: 2020-02-13 | End: 2020-02-13

## 2020-02-13 RX ORDER — ONDANSETRON 2 MG/ML
4 INJECTION INTRAMUSCULAR; INTRAVENOUS ONCE
Status: COMPLETED | OUTPATIENT
Start: 2020-02-13 | End: 2020-02-13

## 2020-02-13 RX ORDER — ONDANSETRON 4 MG/1
4 TABLET, ORALLY DISINTEGRATING ORAL EVERY 6 HOURS PRN
Qty: 20 TABLET | Refills: 0 | Status: SHIPPED | OUTPATIENT
Start: 2020-02-13 | End: 2021-01-20

## 2020-02-13 RX ORDER — CLONIDINE HYDROCHLORIDE 0.1 MG/1
0.2 TABLET ORAL ONCE
Status: COMPLETED | OUTPATIENT
Start: 2020-02-13 | End: 2020-02-13

## 2020-02-13 RX ORDER — HYDROCODONE BITARTRATE AND ACETAMINOPHEN 5; 325 MG/1; MG/1
1 TABLET ORAL EVERY 6 HOURS PRN
Qty: 12 TABLET | Refills: 0 | OUTPATIENT
Start: 2020-02-13 | End: 2021-03-13

## 2020-02-13 RX ORDER — SODIUM CHLORIDE 0.9 % (FLUSH) 0.9 %
10 SYRINGE (ML) INJECTION AS NEEDED
Status: DISCONTINUED | OUTPATIENT
Start: 2020-02-13 | End: 2020-02-13 | Stop reason: HOSPADM

## 2020-02-13 RX ADMIN — ONDANSETRON 4 MG: 2 INJECTION INTRAMUSCULAR; INTRAVENOUS at 09:56

## 2020-02-13 RX ADMIN — CLONIDINE HYDROCHLORIDE 0.2 MG: 0.1 TABLET ORAL at 11:07

## 2020-02-13 RX ADMIN — SODIUM CHLORIDE 1000 ML: 9 INJECTION, SOLUTION INTRAVENOUS at 09:34

## 2020-02-13 RX ADMIN — HUMAN INSULIN 12 UNITS: 100 INJECTION, SOLUTION SUBCUTANEOUS at 10:12

## 2020-02-13 RX ADMIN — MORPHINE SULFATE 2 MG: 2 INJECTION, SOLUTION INTRAMUSCULAR; INTRAVENOUS at 11:10

## 2020-02-13 RX ADMIN — MORPHINE SULFATE 2 MG: 2 INJECTION, SOLUTION INTRAMUSCULAR; INTRAVENOUS at 09:59

## 2020-02-13 NOTE — ED NOTES
Pt placed in gown, pt ambulated to bathroom for urine collection     Angelique Edmonds RN  02/13/20 1156

## 2020-02-13 NOTE — ED NOTES
"Discussed glucose level with pt,pt states has not had anything to eat since last night, only had water to drink this a.m., this morning when she checked her blood sugar, result was 250, pt reports took her insulin this a.m. And took metformin po this a.m., pt also states her bp is \"always high and i'm on medicine and i've had it this morning\" pt states she thinks the pain is making her bp \"higher right now\"     Angelique Edmonds, RN  02/13/20 1012    "

## 2020-02-13 NOTE — ED NOTES
Pt is sweating some, blood glucose check has been requested due to sweating at this time     Angelique Edmonds, RN  02/13/20 9430

## 2020-02-13 NOTE — ED NOTES
Have attempted iv x2 without success but was able to obtain labs, lead nurse aware, pt asking for something for nausea and pain, provider aware     Angelique Edmonds, RN  02/13/20 0541

## 2020-02-13 NOTE — ED NOTES
Will notify provider Tiburcio THRASHER of change in sepsis screen change     Angelique Edmonds, RN  02/13/20 2946

## 2020-02-13 NOTE — ED PROVIDER NOTES
"Subjective   34-year-old white female presents secondary to \"Left flank pain\" X 2 days.  She states her symptoms began yesterday. Patient described sharp, stabbing pain that radiates down left lower abdomen.   She states that she has had many kidney stones and this was very similar.  Patient does have significant history of diabetes, factor V Leiden, GERD, hyperlipidemia.  Patient also reports she has had a history of a right ovarian mass that is been followed by [facility] women's care.      History provided by:  Patient   used: No    Flank Pain   Pain location:  L flank  Pain quality: stabbing    Pain radiates to:  Does not radiate  Pain severity:  Moderate  Onset quality:  Gradual  Duration:  2 days  Timing:  Intermittent  Progression:  Worsening  Chronicity:  New  Context: not alcohol use, not awakening from sleep, not diet changes, not eating, not medication withdrawal, not previous surgeries, not recent illness, not recent sexual activity, not retching, not sick contacts and not suspicious food intake    Relieved by:  Nothing  Worsened by:  Nothing  Ineffective treatments:  None tried  Associated symptoms: chills, fatigue and nausea    Associated symptoms: no anorexia, no chest pain, no cough, no flatus, no hematemesis, no hematochezia and no vomiting    Risk factors: no alcohol abuse, no aspirin use, has not had multiple surgeries, no NSAID use, not pregnant and no recent hospitalization        Review of Systems   Constitutional: Positive for chills and fatigue.   Respiratory: Negative.  Negative for apnea, cough, choking and chest tightness.    Cardiovascular: Negative.  Negative for chest pain and leg swelling.   Gastrointestinal: Positive for nausea. Negative for anorexia, flatus, hematemesis, hematochezia and vomiting.   Genitourinary: Positive for flank pain.   Musculoskeletal: Negative for arthralgias, joint swelling and myalgias.   Hematological: Negative.  Negative for adenopathy. " Does not bruise/bleed easily.   Psychiatric/Behavioral: Negative.  Negative for behavioral problems, confusion and dysphoric mood.   All other systems reviewed and are negative.      Past Medical History:   Diagnosis Date   • Abnormal ECG    • Anemia    • Anxiety    • Asthma    • Depression    • Diabetes mellitus (CMS/HCC)    • DVT (deep venous thrombosis) (CMS/HCC)    • Factor 5 Leiden mutation, heterozygous (CMS/HCC)    • Fibroid    • GERD (gastroesophageal reflux disease)    • Gestational diabetes    • Gout    • Hyperlipidemia    • Hypothyroid    • Kidney stone    • Neuropathy    • Ovarian cyst    • PE (pulmonary embolism)    • Polycystic ovary syndrome    • Preeclampsia    • Rh incompatibility    • Urinary tract infection    • Varicella        Allergies   Allergen Reactions   • Amoxicillin Hives   • Penicillins Hives   • Toradol [Ketorolac Tromethamine] Hives   • Tramadol Swelling       Past Surgical History:   Procedure Laterality Date   • CARDIAC SURGERY      Heart Cath done in    •  SECTION     • CHOLECYSTECTOMY     • COLONOSCOPY         Family History   Problem Relation Age of Onset   • No Known Problems Mother    • No Known Problems Father    • No Known Problems Sister    • No Known Problems Brother    • No Known Problems Son    • No Known Problems Daughter    • No Known Problems Maternal Grandmother    • No Known Problems Maternal Grandfather    • No Known Problems Paternal Grandmother    • No Known Problems Paternal Grandfather    • No Known Problems Cousin    • Rheum arthritis Neg Hx    • Osteoarthritis Neg Hx    • Asthma Neg Hx    • Diabetes Neg Hx    • Heart failure Neg Hx    • Hyperlipidemia Neg Hx    • Hypertension Neg Hx    • Migraines Neg Hx    • Rashes / Skin problems Neg Hx    • Seizures Neg Hx    • Stroke Neg Hx    • Thyroid disease Neg Hx        Social History     Socioeconomic History   • Marital status:      Spouse name: Not on file   • Number of children: Not on file   •  "Years of education: Not on file   • Highest education level: Not on file   Tobacco Use   • Smoking status: Never Smoker   • Smokeless tobacco: Never Used   Substance and Sexual Activity   • Alcohol use: No   • Drug use: Yes     Comment: Pt has track marks on right arm. Pt states \"It's from my INR being drawn.\"    • Sexual activity: Defer           Objective   Physical Exam   Constitutional: She is oriented to person, place, and time. She appears well-developed and well-nourished. No distress.   HENT:   Head: Normocephalic and atraumatic.   Right Ear: External ear normal.   Left Ear: External ear normal.   Nose: Nose normal.   Mouth/Throat: Oropharynx is clear and moist. No oropharyngeal exudate.   Eyes: Pupils are equal, round, and reactive to light. Conjunctivae and EOM are normal. Right eye exhibits no discharge. Left eye exhibits no discharge. No scleral icterus.   Neck: Normal range of motion. Neck supple. No JVD present. No tracheal deviation present. No thyromegaly present.   Cardiovascular: Normal rate, regular rhythm, normal heart sounds and intact distal pulses. Exam reveals no gallop and no friction rub.   No murmur heard.  Pulmonary/Chest: Breath sounds normal. No stridor. No respiratory distress. She has no wheezes. She has no rales. She exhibits no tenderness.   Abdominal: Soft. Bowel sounds are normal. She exhibits no distension and no mass. There is no tenderness. There is no rebound and no guarding. No hernia.   Musculoskeletal: Normal range of motion. She exhibits no edema, tenderness or deformity.   Lymphadenopathy:     She has no cervical adenopathy.   Neurological: She is alert and oriented to person, place, and time. She displays normal reflexes. No cranial nerve deficit or sensory deficit. She exhibits normal muscle tone. Coordination normal.   Skin: Skin is warm and dry. Capillary refill takes less than 2 seconds. No rash noted. She is not diaphoretic. No erythema. No pallor.   Psychiatric: " She has a normal mood and affect. Her behavior is normal. Judgment and thought content normal.   Nursing note and vitals reviewed.      Procedures           ED Course  ED Course as of Feb 13 1032   Thu Feb 13, 2020   1025 33 y/o female that is well known to ER. Patient comes in with flank pain. Patient is known diabetic and has chronic htn. She was found have have elevation in BP, no reported headache, dizziness, focalized weakness. Patient is uncontrolled diabetic. She is encouraged to get better control of BP and glucose. Patient will be treated for renal colic. Advised to return if condition worsens. Otherwise follow-up with urology.     []   1029 Pedro login was obtained [id number]    []      ED Course User Index  [] Dayron Loco PA-C                                           [facility]    Final diagnoses:   Hypertension, unspecified type   Hyperglycemia   Renal colic on left side            Dayron Loco PA-C  02/13/20 1033

## 2020-02-17 ENCOUNTER — APPOINTMENT (OUTPATIENT)
Dept: CT IMAGING | Facility: HOSPITAL | Age: 35
End: 2020-02-17

## 2020-02-17 ENCOUNTER — HOSPITAL ENCOUNTER (EMERGENCY)
Facility: HOSPITAL | Age: 35
Discharge: HOME | End: 2020-02-17
Attending: EMERGENCY MEDICINE | Admitting: EMERGENCY MEDICINE

## 2020-02-17 VITALS
HEART RATE: 91 BPM | OXYGEN SATURATION: 96 % | TEMPERATURE: 98.3 F | DIASTOLIC BLOOD PRESSURE: 91 MMHG | BODY MASS INDEX: 50.02 KG/M2 | WEIGHT: 293 LBS | RESPIRATION RATE: 20 BRPM | SYSTOLIC BLOOD PRESSURE: 177 MMHG | HEIGHT: 64 IN

## 2020-02-17 DIAGNOSIS — R10.9 FLANK PAIN: Primary | ICD-10-CM

## 2020-02-17 LAB
ALBUMIN SERPL-MCNC: 4.32 G/DL (ref 3.5–5.2)
ALBUMIN/GLOB SERPL: 1.1 G/DL
ALP SERPL-CCNC: 97 U/L (ref 39–117)
ALT SERPL W P-5'-P-CCNC: 56 U/L (ref 1–33)
AMYLASE SERPL-CCNC: 22 U/L (ref 28–100)
ANION GAP SERPL CALCULATED.3IONS-SCNC: 17.3 MMOL/L (ref 5–15)
AST SERPL-CCNC: 106 U/L (ref 1–32)
B-HCG UR QL: NEGATIVE
BACTERIA UR QL AUTO: ABNORMAL /HPF
BASOPHILS # BLD AUTO: 0.03 10*3/MM3 (ref 0–0.2)
BASOPHILS NFR BLD AUTO: 0.5 % (ref 0–1.5)
BILIRUB SERPL-MCNC: 0.5 MG/DL (ref 0.2–1.2)
BILIRUB UR QL STRIP: NEGATIVE
BUN BLD-MCNC: 5 MG/DL (ref 6–20)
BUN/CREAT SERPL: 9.3 (ref 7–25)
CALCIUM SPEC-SCNC: 9.2 MG/DL (ref 8.6–10.5)
CHLORIDE SERPL-SCNC: 97 MMOL/L (ref 98–107)
CLARITY UR: CLEAR
CO2 SERPL-SCNC: 22.7 MMOL/L (ref 22–29)
COLOR UR: ABNORMAL
CREAT BLD-MCNC: 0.54 MG/DL (ref 0.57–1)
DEPRECATED RDW RBC AUTO: 46.6 FL (ref 37–54)
EOSINOPHIL # BLD AUTO: 0.11 10*3/MM3 (ref 0–0.4)
EOSINOPHIL NFR BLD AUTO: 1.9 % (ref 0.3–6.2)
ERYTHROCYTE [DISTWIDTH] IN BLOOD BY AUTOMATED COUNT: 14.3 % (ref 12.3–15.4)
GFR SERPL CREATININE-BSD FRML MDRD: 130 ML/MIN/1.73
GLOBULIN UR ELPH-MCNC: 3.9 GM/DL
GLUCOSE BLD-MCNC: 259 MG/DL (ref 65–99)
GLUCOSE UR STRIP-MCNC: NEGATIVE MG/DL
HCT VFR BLD AUTO: 38.5 % (ref 34–46.6)
HGB BLD-MCNC: 12.6 G/DL (ref 12–15.9)
HGB UR QL STRIP.AUTO: ABNORMAL
HYALINE CASTS UR QL AUTO: ABNORMAL /LPF
IMM GRANULOCYTES # BLD AUTO: 0.03 10*3/MM3 (ref 0–0.05)
IMM GRANULOCYTES NFR BLD AUTO: 0.5 % (ref 0–0.5)
INR PPP: 1.43 (ref 0.9–1.1)
KETONES UR QL STRIP: ABNORMAL
LEUKOCYTE ESTERASE UR QL STRIP.AUTO: NEGATIVE
LIPASE SERPL-CCNC: 32 U/L (ref 13–60)
LYMPHOCYTES # BLD AUTO: 1.23 10*3/MM3 (ref 0.7–3.1)
LYMPHOCYTES NFR BLD AUTO: 21.5 % (ref 19.6–45.3)
MCH RBC QN AUTO: 29.9 PG (ref 26.6–33)
MCHC RBC AUTO-ENTMCNC: 32.7 G/DL (ref 31.5–35.7)
MCV RBC AUTO: 91.2 FL (ref 79–97)
MONOCYTES # BLD AUTO: 0.42 10*3/MM3 (ref 0.1–0.9)
MONOCYTES NFR BLD AUTO: 7.4 % (ref 5–12)
NEUTROPHILS # BLD AUTO: 3.89 10*3/MM3 (ref 1.7–7)
NEUTROPHILS NFR BLD AUTO: 68.2 % (ref 42.7–76)
NITRITE UR QL STRIP: NEGATIVE
NRBC BLD AUTO-RTO: 0 /100 WBC (ref 0–0.2)
PH UR STRIP.AUTO: 7 [PH] (ref 5–8)
PLATELET # BLD AUTO: 227 10*3/MM3 (ref 140–450)
PMV BLD AUTO: 9.5 FL (ref 6–12)
POTASSIUM BLD-SCNC: 4 MMOL/L (ref 3.5–5.2)
PROT SERPL-MCNC: 8.2 G/DL (ref 6–8.5)
PROT UR QL STRIP: ABNORMAL
PROTHROMBIN TIME: 18.2 SECONDS (ref 11–15.4)
RBC # BLD AUTO: 4.22 10*6/MM3 (ref 3.77–5.28)
RBC # UR: ABNORMAL /HPF
REF LAB TEST METHOD: ABNORMAL
SODIUM BLD-SCNC: 137 MMOL/L (ref 136–145)
SP GR UR STRIP: 1.02 (ref 1–1.03)
SQUAMOUS #/AREA URNS HPF: ABNORMAL /HPF
UROBILINOGEN UR QL STRIP: ABNORMAL
WBC NRBC COR # BLD: 5.71 10*3/MM3 (ref 3.4–10.8)
WBC UR QL AUTO: ABNORMAL /HPF

## 2020-02-17 PROCEDURE — 25010000002 MORPHINE PER 10 MG: Performed by: NURSE PRACTITIONER

## 2020-02-17 PROCEDURE — 96361 HYDRATE IV INFUSION ADD-ON: CPT

## 2020-02-17 PROCEDURE — 99284 EMERGENCY DEPT VISIT MOD MDM: CPT

## 2020-02-17 PROCEDURE — 85610 PROTHROMBIN TIME: CPT | Performed by: NURSE PRACTITIONER

## 2020-02-17 PROCEDURE — 74176 CT ABD & PELVIS W/O CONTRAST: CPT

## 2020-02-17 PROCEDURE — 81025 URINE PREGNANCY TEST: CPT | Performed by: NURSE PRACTITIONER

## 2020-02-17 PROCEDURE — 82150 ASSAY OF AMYLASE: CPT | Performed by: NURSE PRACTITIONER

## 2020-02-17 PROCEDURE — 81001 URINALYSIS AUTO W/SCOPE: CPT | Performed by: NURSE PRACTITIONER

## 2020-02-17 PROCEDURE — 83690 ASSAY OF LIPASE: CPT | Performed by: NURSE PRACTITIONER

## 2020-02-17 PROCEDURE — 80053 COMPREHEN METABOLIC PANEL: CPT | Performed by: NURSE PRACTITIONER

## 2020-02-17 PROCEDURE — 85025 COMPLETE CBC W/AUTO DIFF WBC: CPT | Performed by: NURSE PRACTITIONER

## 2020-02-17 PROCEDURE — 96374 THER/PROPH/DIAG INJ IV PUSH: CPT

## 2020-02-17 PROCEDURE — 25010000002 ONDANSETRON PER 1 MG: Performed by: NURSE PRACTITIONER

## 2020-02-17 PROCEDURE — 74176 CT ABD & PELVIS W/O CONTRAST: CPT | Performed by: RADIOLOGY

## 2020-02-17 PROCEDURE — 96375 TX/PRO/DX INJ NEW DRUG ADDON: CPT

## 2020-02-17 PROCEDURE — 96376 TX/PRO/DX INJ SAME DRUG ADON: CPT

## 2020-02-17 RX ORDER — SODIUM CHLORIDE 0.9 % (FLUSH) 0.9 %
10 SYRINGE (ML) INJECTION AS NEEDED
Status: DISCONTINUED | OUTPATIENT
Start: 2020-02-17 | End: 2020-02-17 | Stop reason: HOSPADM

## 2020-02-17 RX ORDER — ONDANSETRON 2 MG/ML
4 INJECTION INTRAMUSCULAR; INTRAVENOUS ONCE
Status: COMPLETED | OUTPATIENT
Start: 2020-02-17 | End: 2020-02-17

## 2020-02-17 RX ORDER — MORPHINE SULFATE 2 MG/ML
2 INJECTION, SOLUTION INTRAMUSCULAR; INTRAVENOUS ONCE
Status: COMPLETED | OUTPATIENT
Start: 2020-02-17 | End: 2020-02-17

## 2020-02-17 RX ADMIN — MORPHINE SULFATE 4 MG: 4 INJECTION, SOLUTION INTRAMUSCULAR; INTRAVENOUS at 14:17

## 2020-02-17 RX ADMIN — ONDANSETRON 4 MG: 2 INJECTION INTRAMUSCULAR; INTRAVENOUS at 14:16

## 2020-02-17 RX ADMIN — MORPHINE SULFATE 2 MG: 2 INJECTION, SOLUTION INTRAMUSCULAR; INTRAVENOUS at 15:20

## 2020-02-17 RX ADMIN — SODIUM CHLORIDE 1000 ML: 9 INJECTION, SOLUTION INTRAVENOUS at 15:20

## 2020-02-17 NOTE — ED PROVIDER NOTES
Subjective     History provided by:  Patient   used: No    Flank Pain   Pain location:  R flank  Pain quality: sharp    Pain radiates to:  RLQ  Pain severity:  Severe  Onset quality:  Sudden  Duration:  2 days  Timing:  Constant  Progression:  Worsening  Chronicity:  Recurrent  Context: not alcohol use, not diet changes, not previous surgeries, not recent sexual activity, not sick contacts and not suspicious food intake    Relieved by:  Nothing  Worsened by:  Nothing  Ineffective treatments:  OTC medications  Associated symptoms: diarrhea and nausea    Associated symptoms: no melena    Risk factors: no alcohol abuse and has not had multiple surgeries        Review of Systems   Constitutional: Negative.    HENT: Negative.    Eyes: Negative.    Respiratory: Negative.    Cardiovascular: Negative.    Gastrointestinal: Positive for diarrhea and nausea. Negative for melena.   Endocrine: Negative.    Genitourinary: Positive for flank pain.   Skin: Negative.    Allergic/Immunologic: Negative.    Neurological: Negative.    Hematological: Negative.    Psychiatric/Behavioral: Negative.        Past Medical History:   Diagnosis Date   • Abnormal ECG    • Anemia    • Anxiety    • Asthma    • Depression    • Diabetes mellitus (CMS/HCC)    • DVT (deep venous thrombosis) (CMS/HCC)    • Factor 5 Leiden mutation, heterozygous (CMS/HCC)    • Fibroid    • GERD (gastroesophageal reflux disease)    • Gestational diabetes    • Gout    • Hyperlipidemia    • Hypothyroid    • Kidney stone    • Neuropathy    • Ovarian cyst    • PE (pulmonary embolism)    • Polycystic ovary syndrome    • Preeclampsia    • Rh incompatibility    • Urinary tract infection    • Varicella        Allergies   Allergen Reactions   • Amoxicillin Hives   • Penicillins Hives   • Toradol [Ketorolac Tromethamine] Hives   • Tramadol Swelling       Past Surgical History:   Procedure Laterality Date   • CARDIAC SURGERY      Heart Cath done in 2011   •  " SECTION     • CHOLECYSTECTOMY     • COLONOSCOPY         Family History   Problem Relation Age of Onset   • No Known Problems Mother    • No Known Problems Father    • No Known Problems Sister    • No Known Problems Brother    • No Known Problems Son    • No Known Problems Daughter    • No Known Problems Maternal Grandmother    • No Known Problems Maternal Grandfather    • No Known Problems Paternal Grandmother    • No Known Problems Paternal Grandfather    • No Known Problems Cousin    • Rheum arthritis Neg Hx    • Osteoarthritis Neg Hx    • Asthma Neg Hx    • Diabetes Neg Hx    • Heart failure Neg Hx    • Hyperlipidemia Neg Hx    • Hypertension Neg Hx    • Migraines Neg Hx    • Rashes / Skin problems Neg Hx    • Seizures Neg Hx    • Stroke Neg Hx    • Thyroid disease Neg Hx        Social History     Socioeconomic History   • Marital status:      Spouse name: Not on file   • Number of children: Not on file   • Years of education: Not on file   • Highest education level: Not on file   Tobacco Use   • Smoking status: Never Smoker   • Smokeless tobacco: Never Used   Substance and Sexual Activity   • Alcohol use: No   • Drug use: Yes     Comment: Pt has track marks on right arm. Pt states \"It's from my INR being drawn.\"    • Sexual activity: Defer           Objective   Physical Exam   Constitutional: She is oriented to person, place, and time. She appears well-developed and well-nourished.   HENT:   Head: Normocephalic.   Right Ear: External ear normal.   Left Ear: External ear normal.   Mouth/Throat: Oropharynx is clear and moist.   Eyes: Pupils are equal, round, and reactive to light. Conjunctivae and EOM are normal.   Neck: Normal range of motion. Neck supple.   Cardiovascular: Normal rate, regular rhythm, normal heart sounds and intact distal pulses.   Pulmonary/Chest: Effort normal and breath sounds normal.   Abdominal: Soft. Bowel sounds are normal. There is tenderness in the right upper quadrant. "   Musculoskeletal: Normal range of motion.   Neurological: She is alert and oriented to person, place, and time.   Skin: Skin is warm and dry. Capillary refill takes less than 2 seconds.   Psychiatric: She has a normal mood and affect. Her behavior is normal. Thought content normal.   Nursing note and vitals reviewed.      Procedures           ED Course                                           MDM    Final diagnoses:   Flank pain            Charly Fisher, APRN  02/17/20 5293

## 2020-03-04 ENCOUNTER — HOSPITAL ENCOUNTER (EMERGENCY)
Facility: HOSPITAL | Age: 35
Discharge: HOME | End: 2020-03-04
Attending: STUDENT IN AN ORGANIZED HEALTH CARE EDUCATION/TRAINING PROGRAM | Admitting: STUDENT IN AN ORGANIZED HEALTH CARE EDUCATION/TRAINING PROGRAM

## 2020-03-04 ENCOUNTER — APPOINTMENT (OUTPATIENT)
Dept: CT IMAGING | Facility: HOSPITAL | Age: 35
End: 2020-03-04

## 2020-03-04 ENCOUNTER — APPOINTMENT (OUTPATIENT)
Dept: ULTRASOUND IMAGING | Facility: HOSPITAL | Age: 35
End: 2020-03-04

## 2020-03-04 VITALS
BODY MASS INDEX: 50.02 KG/M2 | SYSTOLIC BLOOD PRESSURE: 157 MMHG | HEIGHT: 64 IN | DIASTOLIC BLOOD PRESSURE: 104 MMHG | RESPIRATION RATE: 18 BRPM | TEMPERATURE: 97.7 F | WEIGHT: 293 LBS | HEART RATE: 99 BPM | OXYGEN SATURATION: 93 %

## 2020-03-04 DIAGNOSIS — R31.9 HEMATURIA, UNSPECIFIED TYPE: ICD-10-CM

## 2020-03-04 DIAGNOSIS — R10.9 ACUTE RIGHT FLANK PAIN: Primary | ICD-10-CM

## 2020-03-04 DIAGNOSIS — Z87.442 HISTORY OF KIDNEY STONES: ICD-10-CM

## 2020-03-04 LAB
ALBUMIN SERPL-MCNC: 4.4 G/DL (ref 3.5–5.2)
ALBUMIN/GLOB SERPL: 1.2 G/DL
ALP SERPL-CCNC: 94 U/L (ref 39–117)
ALT SERPL W P-5'-P-CCNC: 37 U/L (ref 1–33)
ANION GAP SERPL CALCULATED.3IONS-SCNC: 15.9 MMOL/L (ref 5–15)
AST SERPL-CCNC: 65 U/L (ref 1–32)
BACTERIA UR QL AUTO: ABNORMAL /HPF
BASOPHILS # BLD AUTO: 0.04 10*3/MM3 (ref 0–0.2)
BASOPHILS NFR BLD AUTO: 0.8 % (ref 0–1.5)
BILIRUB SERPL-MCNC: 0.4 MG/DL (ref 0.2–1.2)
BILIRUB UR QL STRIP: NEGATIVE
BUN BLD-MCNC: 9 MG/DL (ref 6–20)
BUN/CREAT SERPL: 16.1 (ref 7–25)
CALCIUM SPEC-SCNC: 10.5 MG/DL (ref 8.6–10.5)
CHLORIDE SERPL-SCNC: 96 MMOL/L (ref 98–107)
CLARITY UR: ABNORMAL
CO2 SERPL-SCNC: 23.1 MMOL/L (ref 22–29)
COLOR UR: ABNORMAL
CREAT BLD-MCNC: 0.56 MG/DL (ref 0.57–1)
DEPRECATED RDW RBC AUTO: 44.7 FL (ref 37–54)
EOSINOPHIL # BLD AUTO: 0.14 10*3/MM3 (ref 0–0.4)
EOSINOPHIL NFR BLD AUTO: 2.8 % (ref 0.3–6.2)
ERYTHROCYTE [DISTWIDTH] IN BLOOD BY AUTOMATED COUNT: 13.9 % (ref 12.3–15.4)
GFR SERPL CREATININE-BSD FRML MDRD: 125 ML/MIN/1.73
GLOBULIN UR ELPH-MCNC: 3.8 GM/DL
GLUCOSE BLD-MCNC: 346 MG/DL (ref 65–99)
GLUCOSE UR STRIP-MCNC: NEGATIVE MG/DL
HCG INTACT+B SERPL-ACNC: <0.5 MIU/ML
HCG SERPL QL: NEGATIVE
HCT VFR BLD AUTO: 36 % (ref 34–46.6)
HGB BLD-MCNC: 12.1 G/DL (ref 12–15.9)
HGB UR QL STRIP.AUTO: ABNORMAL
HOLD SPECIMEN: NORMAL
HOLD SPECIMEN: NORMAL
HYALINE CASTS UR QL AUTO: ABNORMAL /LPF
IMM GRANULOCYTES # BLD AUTO: 0.02 10*3/MM3 (ref 0–0.05)
IMM GRANULOCYTES NFR BLD AUTO: 0.4 % (ref 0–0.5)
INR PPP: 0.94 (ref 0.9–1.1)
KETONES UR QL STRIP: NEGATIVE
LEUKOCYTE ESTERASE UR QL STRIP.AUTO: ABNORMAL
LIPASE SERPL-CCNC: 41 U/L (ref 13–60)
LYMPHOCYTES # BLD AUTO: 1.26 10*3/MM3 (ref 0.7–3.1)
LYMPHOCYTES NFR BLD AUTO: 25.3 % (ref 19.6–45.3)
MCH RBC QN AUTO: 30.3 PG (ref 26.6–33)
MCHC RBC AUTO-ENTMCNC: 33.6 G/DL (ref 31.5–35.7)
MCV RBC AUTO: 90.2 FL (ref 79–97)
MONOCYTES # BLD AUTO: 0.48 10*3/MM3 (ref 0.1–0.9)
MONOCYTES NFR BLD AUTO: 9.6 % (ref 5–12)
NEUTROPHILS # BLD AUTO: 3.04 10*3/MM3 (ref 1.7–7)
NEUTROPHILS NFR BLD AUTO: 61.1 % (ref 42.7–76)
NITRITE UR QL STRIP: NEGATIVE
NRBC BLD AUTO-RTO: 0 /100 WBC (ref 0–0.2)
PH UR STRIP.AUTO: 5.5 [PH] (ref 5–8)
PLATELET # BLD AUTO: 237 10*3/MM3 (ref 140–450)
PMV BLD AUTO: 9.2 FL (ref 6–12)
POTASSIUM BLD-SCNC: 4.1 MMOL/L (ref 3.5–5.2)
PROT SERPL-MCNC: 8.2 G/DL (ref 6–8.5)
PROT UR QL STRIP: ABNORMAL
PROTHROMBIN TIME: 12.9 SECONDS (ref 12–15.1)
RBC # BLD AUTO: 3.99 10*6/MM3 (ref 3.77–5.28)
RBC # UR: ABNORMAL /HPF
REF LAB TEST METHOD: ABNORMAL
SODIUM BLD-SCNC: 135 MMOL/L (ref 136–145)
SP GR UR STRIP: 1.03 (ref 1–1.03)
SQUAMOUS #/AREA URNS HPF: ABNORMAL /HPF
UROBILINOGEN UR QL STRIP: ABNORMAL
WBC NRBC COR # BLD: 4.98 10*3/MM3 (ref 3.4–10.8)
WBC UR QL AUTO: ABNORMAL /HPF
WHOLE BLOOD HOLD SPECIMEN: NORMAL
WHOLE BLOOD HOLD SPECIMEN: NORMAL

## 2020-03-04 PROCEDURE — 85610 PROTHROMBIN TIME: CPT | Performed by: STUDENT IN AN ORGANIZED HEALTH CARE EDUCATION/TRAINING PROGRAM

## 2020-03-04 PROCEDURE — 74176 CT ABD & PELVIS W/O CONTRAST: CPT

## 2020-03-04 PROCEDURE — 25010000002 MORPHINE PER 10 MG: Performed by: STUDENT IN AN ORGANIZED HEALTH CARE EDUCATION/TRAINING PROGRAM

## 2020-03-04 PROCEDURE — 80053 COMPREHEN METABOLIC PANEL: CPT

## 2020-03-04 PROCEDURE — 96374 THER/PROPH/DIAG INJ IV PUSH: CPT

## 2020-03-04 PROCEDURE — 99283 EMERGENCY DEPT VISIT LOW MDM: CPT

## 2020-03-04 PROCEDURE — 85025 COMPLETE CBC W/AUTO DIFF WBC: CPT

## 2020-03-04 PROCEDURE — 96375 TX/PRO/DX INJ NEW DRUG ADDON: CPT

## 2020-03-04 PROCEDURE — 84703 CHORIONIC GONADOTROPIN ASSAY: CPT

## 2020-03-04 PROCEDURE — 83690 ASSAY OF LIPASE: CPT

## 2020-03-04 PROCEDURE — 81001 URINALYSIS AUTO W/SCOPE: CPT

## 2020-03-04 PROCEDURE — 25010000002 ONDANSETRON PER 1 MG: Performed by: STUDENT IN AN ORGANIZED HEALTH CARE EDUCATION/TRAINING PROGRAM

## 2020-03-04 RX ORDER — MORPHINE SULFATE 4 MG/ML
4 INJECTION, SOLUTION INTRAMUSCULAR; INTRAVENOUS ONCE
Status: COMPLETED | OUTPATIENT
Start: 2020-03-04 | End: 2020-03-04

## 2020-03-04 RX ORDER — SODIUM CHLORIDE 0.9 % (FLUSH) 0.9 %
10 SYRINGE (ML) INJECTION AS NEEDED
Status: DISCONTINUED | OUTPATIENT
Start: 2020-03-04 | End: 2020-03-04 | Stop reason: HOSPADM

## 2020-03-04 RX ORDER — ONDANSETRON 2 MG/ML
4 INJECTION INTRAMUSCULAR; INTRAVENOUS ONCE
Status: COMPLETED | OUTPATIENT
Start: 2020-03-04 | End: 2020-03-04

## 2020-03-04 RX ADMIN — ONDANSETRON 4 MG: 2 INJECTION INTRAMUSCULAR; INTRAVENOUS at 10:40

## 2020-03-04 RX ADMIN — LIDOCAINE HYDROCHLORIDE 150 MG: 10 INJECTION, SOLUTION INFILTRATION; PERINEURAL at 10:43

## 2020-03-04 RX ADMIN — MORPHINE SULFATE 4 MG: 4 INJECTION, SOLUTION INTRAMUSCULAR; INTRAVENOUS at 10:41

## 2020-03-04 NOTE — ED PROVIDER NOTES
"Subjective   Patient is a 34-year-old female with a past medical history prominent for morbid obesity, diabetes, factor V Leiden mutation, and kidney stones who presents to the emergency department right-sided flank pain of 2 days duration that has been persistent since onset.  She reports pain radiates into the right side of her groin and feels like previous kidney stones.  Pain is \"excruciating\", constant, nothing makes better or worse.          Review of Systems   All other systems reviewed and are negative.      Past Medical History:   Diagnosis Date   • Abnormal ECG    • Anemia    • Anxiety    • Asthma    • Depression    • Diabetes mellitus (CMS/HCC)    • DVT (deep venous thrombosis) (CMS/[facility])    • Factor 5 Leiden mutation, heterozygous (CMS/[facility])    • Fibroid    • GERD (gastroesophageal reflux disease)    • Gestational diabetes    • Gout    • Hyperlipidemia    • Hypothyroid    • Kidney stone    • Neuropathy    • Ovarian cyst    • PE (pulmonary embolism)    • Polycystic ovary syndrome    • Preeclampsia    • Rh incompatibility    • Urinary tract infection    • Varicella        Allergies   Allergen Reactions   • Amoxicillin Hives   • Haldol [Haloperidol] Hives   • Penicillins Hives   • Toradol [Ketorolac Tromethamine] Hives   • Tramadol Swelling       Past Surgical History:   Procedure Laterality Date   • CARDIAC SURGERY      Heart Cath done in    •  SECTION     • CHOLECYSTECTOMY     • COLONOSCOPY         Family History   Problem Relation Age of Onset   • No Known Problems Mother    • No Known Problems Father    • No Known Problems Sister    • No Known Problems Brother    • No Known Problems Son    • No Known Problems Daughter    • No Known Problems Maternal Grandmother    • No Known Problems Maternal Grandfather    • No Known Problems Paternal Grandmother    • No Known Problems Paternal Grandfather    • No Known Problems Cousin    • Rheum arthritis Neg Hx    • Osteoarthritis Neg Hx    • Asthma Neg Hx  " "  • Diabetes Neg Hx    • Heart failure Neg Hx    • Hyperlipidemia Neg Hx    • Hypertension Neg Hx    • Migraines Neg Hx    • Rashes / Skin problems Neg Hx    • Seizures Neg Hx    • Stroke Neg Hx    • Thyroid disease Neg Hx        Social History     Socioeconomic History   • Marital status:      Spouse name: Not on file   • Number of children: Not on file   • Years of education: Not on file   • Highest education level: Not on file   Tobacco Use   • Smoking status: Never Smoker   • Smokeless tobacco: Never Used   Substance and Sexual Activity   • Alcohol use: No   • Drug use: Yes     Comment: Pt has track marks on right arm. Pt states \"It's from my INR being drawn.\"    • Sexual activity: Defer           Objective   Physical Exam   Nursing note and vitals reviewed.    GEN: Appears uncomfortable, morbidly obese, nontoxic in appearance  Head: Normocephalic, atraumatic  Eyes: Patient has left lateral strabismus  ENT: Posterior pharynx normal in appearance, oral mucosa is moist  Chest: Nontender to palpation  Cardiovascular: Regular rate  Lungs: Clear to auscultation bilaterally  Abdomen: Soft, nontender, nondistended, no peritoneal signs  Extremities: No edema, normal appearance  Neuro: GCS 15  Psych: Tearful and anxious        Procedures           ED Course                                           MDM  Number of Diagnoses or Management Options  Acute right flank pain:   Hematuria, unspecified type:   History of kidney stones:   Diagnosis management comments: Differential diagnosis would include kidney stone, pyelonephritis, musculoskeletal back pain, AAA, aortic dissection, or other concerns.  Treated with IV narcotics, lidocaine, antiemetics, and fluids with good symptom control.  I tried to avoid scanning this patient as she has had multiple CT scans in the past year.  With the patient's symptoms that cannot be controlled along with her history of kidney stones and a whole host of other comorbid conditions I " did decide to go through with a CT scan.  CT scan showed no acute pathology when compared to previous CT scans   patient will not be prescribed narcotics despite her requesting.  Given referral to urology in case they do not pass the stone.        Amount and/or Complexity of Data Reviewed  Clinical lab tests: ordered and reviewed  Tests in the radiology section of CPT®: reviewed  Discussion of test results with the performing providers: yes  Decide to obtain previous medical records or to obtain history from someone other than the patient: yes  Obtain history from someone other than the patient: yes  Review and summarize past medical records: yes  Independent visualization of images, tracings, or specimens: yes        Final diagnoses:   Acute right flank pain   History of kidney stones   Hematuria, unspecified type            Neo Bautista MD  03/04/20 4401

## 2020-03-13 ENCOUNTER — HOSPITAL ENCOUNTER (EMERGENCY)
Facility: HOSPITAL | Age: 35
Discharge: HOME | End: 2020-03-13
Attending: FAMILY MEDICINE | Admitting: EMERGENCY MEDICINE

## 2020-03-13 VITALS
OXYGEN SATURATION: 95 % | HEIGHT: 64 IN | BODY MASS INDEX: 50.02 KG/M2 | TEMPERATURE: 100.1 F | SYSTOLIC BLOOD PRESSURE: 190 MMHG | DIASTOLIC BLOOD PRESSURE: 97 MMHG | WEIGHT: 293 LBS | HEART RATE: 88 BPM | RESPIRATION RATE: 18 BRPM

## 2020-03-13 DIAGNOSIS — N23 RENAL COLIC: Primary | ICD-10-CM

## 2020-03-13 LAB
ALBUMIN SERPL-MCNC: 4.23 G/DL (ref 3.5–5.2)
ALBUMIN/GLOB SERPL: 1.1 G/DL
ALP SERPL-CCNC: 93 U/L (ref 39–117)
ALT SERPL W P-5'-P-CCNC: 42 U/L (ref 1–33)
ANION GAP SERPL CALCULATED.3IONS-SCNC: 16 MMOL/L (ref 5–15)
AST SERPL-CCNC: 80 U/L (ref 1–32)
B-HCG UR QL: NEGATIVE
BACTERIA UR QL AUTO: ABNORMAL /HPF
BASOPHILS # BLD AUTO: 0.04 10*3/MM3 (ref 0–0.2)
BASOPHILS NFR BLD AUTO: 0.8 % (ref 0–1.5)
BILIRUB SERPL-MCNC: 0.5 MG/DL (ref 0.2–1.2)
BILIRUB UR QL STRIP: NEGATIVE
BUN BLD-MCNC: 9 MG/DL (ref 6–20)
BUN/CREAT SERPL: 18 (ref 7–25)
CALCIUM SPEC-SCNC: 9.7 MG/DL (ref 8.6–10.5)
CHLORIDE SERPL-SCNC: 97 MMOL/L (ref 98–107)
CLARITY UR: ABNORMAL
CO2 SERPL-SCNC: 23 MMOL/L (ref 22–29)
COLOR UR: ABNORMAL
CREAT BLD-MCNC: 0.5 MG/DL (ref 0.57–1)
DEPRECATED RDW RBC AUTO: 47.8 FL (ref 37–54)
EOSINOPHIL # BLD AUTO: 0.15 10*3/MM3 (ref 0–0.4)
EOSINOPHIL NFR BLD AUTO: 3.1 % (ref 0.3–6.2)
ERYTHROCYTE [DISTWIDTH] IN BLOOD BY AUTOMATED COUNT: 14.6 % (ref 12.3–15.4)
GFR SERPL CREATININE-BSD FRML MDRD: 142 ML/MIN/1.73
GLOBULIN UR ELPH-MCNC: 3.9 GM/DL
GLUCOSE BLD-MCNC: 413 MG/DL (ref 65–99)
GLUCOSE UR STRIP-MCNC: ABNORMAL MG/DL
HCT VFR BLD AUTO: 37.8 % (ref 34–46.6)
HGB BLD-MCNC: 12.3 G/DL (ref 12–15.9)
HGB UR QL STRIP.AUTO: ABNORMAL
HOLD SPECIMEN: NORMAL
HOLD SPECIMEN: NORMAL
HYALINE CASTS UR QL AUTO: ABNORMAL /LPF
IMM GRANULOCYTES # BLD AUTO: 0.03 10*3/MM3 (ref 0–0.05)
IMM GRANULOCYTES NFR BLD AUTO: 0.6 % (ref 0–0.5)
KETONES UR QL STRIP: NEGATIVE
LEUKOCYTE ESTERASE UR QL STRIP.AUTO: ABNORMAL
LIPASE SERPL-CCNC: 45 U/L (ref 13–60)
LYMPHOCYTES # BLD AUTO: 0.97 10*3/MM3 (ref 0.7–3.1)
LYMPHOCYTES NFR BLD AUTO: 19.9 % (ref 19.6–45.3)
MCH RBC QN AUTO: 29.9 PG (ref 26.6–33)
MCHC RBC AUTO-ENTMCNC: 32.5 G/DL (ref 31.5–35.7)
MCV RBC AUTO: 92 FL (ref 79–97)
MONOCYTES # BLD AUTO: 0.47 10*3/MM3 (ref 0.1–0.9)
MONOCYTES NFR BLD AUTO: 9.6 % (ref 5–12)
NEUTROPHILS # BLD AUTO: 3.22 10*3/MM3 (ref 1.7–7)
NEUTROPHILS NFR BLD AUTO: 66 % (ref 42.7–76)
NITRITE UR QL STRIP: NEGATIVE
NRBC BLD AUTO-RTO: 0 /100 WBC (ref 0–0.2)
PH UR STRIP.AUTO: 5.5 [PH] (ref 5–8)
PLATELET # BLD AUTO: 239 10*3/MM3 (ref 140–450)
PMV BLD AUTO: 9.6 FL (ref 6–12)
POTASSIUM BLD-SCNC: 4.4 MMOL/L (ref 3.5–5.2)
PROT SERPL-MCNC: 8.1 G/DL (ref 6–8.5)
PROT UR QL STRIP: ABNORMAL
RBC # BLD AUTO: 4.11 10*6/MM3 (ref 3.77–5.28)
RBC # UR: ABNORMAL /HPF
REF LAB TEST METHOD: ABNORMAL
SODIUM BLD-SCNC: 136 MMOL/L (ref 136–145)
SP GR UR STRIP: 1.02 (ref 1–1.03)
SQUAMOUS #/AREA URNS HPF: ABNORMAL /HPF
UROBILINOGEN UR QL STRIP: ABNORMAL
WBC NRBC COR # BLD: 4.88 10*3/MM3 (ref 3.4–10.8)
WBC UR QL AUTO: ABNORMAL /HPF
WHOLE BLOOD HOLD SPECIMEN: NORMAL
WHOLE BLOOD HOLD SPECIMEN: NORMAL

## 2020-03-13 PROCEDURE — 36415 COLL VENOUS BLD VENIPUNCTURE: CPT

## 2020-03-13 PROCEDURE — 83690 ASSAY OF LIPASE: CPT | Performed by: PHYSICIAN ASSISTANT

## 2020-03-13 PROCEDURE — 99283 EMERGENCY DEPT VISIT LOW MDM: CPT

## 2020-03-13 PROCEDURE — 81025 URINE PREGNANCY TEST: CPT | Performed by: PHYSICIAN ASSISTANT

## 2020-03-13 PROCEDURE — 63710000001 INSULIN REGULAR HUMAN PER 5 UNITS: Performed by: PHYSICIAN ASSISTANT

## 2020-03-13 PROCEDURE — 80053 COMPREHEN METABOLIC PANEL: CPT | Performed by: PHYSICIAN ASSISTANT

## 2020-03-13 PROCEDURE — 81001 URINALYSIS AUTO W/SCOPE: CPT | Performed by: PHYSICIAN ASSISTANT

## 2020-03-13 PROCEDURE — 85025 COMPLETE CBC W/AUTO DIFF WBC: CPT | Performed by: PHYSICIAN ASSISTANT

## 2020-03-13 RX ADMIN — HUMAN INSULIN 6 UNITS: 100 INJECTION, SOLUTION SUBCUTANEOUS at 15:43

## 2020-03-13 NOTE — ED NOTES
Pt reports that she has to go get her son and did not want her vitals retaken.  Pt discharged home with family, ambulatory, AAOx4 with Jacy Bean RN  03/13/20 6443

## 2020-03-13 NOTE — ED NOTES
Pt reports that she has been having left sided flank pain with n/v and urinary symptoms since yesterday, has history of kidney stones.  Pt family at bedside.     Jacy Rooney RN  03/13/20 7146

## 2020-03-13 NOTE — ED NOTES
Pt resting quietly on stretcher with continuing complaints.  Pt AAOx4 with no resp distress noted, respirations even and unlabored.  Pt denies any needs at this time.  Skin PWD.  Pt family at bedside. Will continue to monitor and follow plan of care.  Bed rails up x2, bed in lowest position, call light in reach.       Jacy Rooney, RN  03/13/20 6435

## 2020-03-13 NOTE — ED PROVIDER NOTES
Subjective   33 y/o female patient presents to the ED with complaints of left flank pain and hematuria.  No fevers. Pt was seen at [facility] on 3/4/20 with negative CT scan.  Patient states she has urology appt next week. Pt presents today for pain medication.           Review of Systems   Constitutional: Negative.    HENT: Negative.    Eyes: Negative.    Respiratory: Negative.    Cardiovascular: Negative.    Gastrointestinal: Negative.    Endocrine: Negative.    Genitourinary: Positive for flank pain.   Skin: Negative.    Allergic/Immunologic: Negative.    Neurological: Negative.    Hematological: Negative.    Psychiatric/Behavioral: Negative.    All other systems reviewed and are negative.      Past Medical History:   Diagnosis Date   • Abnormal ECG    • Anemia    • Anxiety    • Asthma    • Depression    • Diabetes mellitus (CMS/HCC)    • DVT (deep venous thrombosis) (CMS/HCC)    • Factor 5 Leiden mutation, heterozygous (CMS/HCC)    • Fibroid    • GERD (gastroesophageal reflux disease)    • Gestational diabetes    • Gout    • Hyperlipidemia    • Hypothyroid    • Kidney stone    • Neuropathy    • Ovarian cyst    • PE (pulmonary embolism)    • Polycystic ovary syndrome    • Preeclampsia    • Rh incompatibility    • Urinary tract infection    • Varicella        Allergies   Allergen Reactions   • Amoxicillin Hives   • Haldol [Haloperidol] Hives   • Penicillins Hives   • Toradol [Ketorolac Tromethamine] Hives   • Tramadol Swelling       Past Surgical History:   Procedure Laterality Date   • CARDIAC SURGERY      Heart Cath done in    •  SECTION     • CHOLECYSTECTOMY     • COLONOSCOPY         Family History   Problem Relation Age of Onset   • No Known Problems Mother    • No Known Problems Father    • No Known Problems Sister    • No Known Problems Brother    • No Known Problems Son    • No Known Problems Daughter    • No Known Problems Maternal Grandmother    • No Known Problems Maternal Grandfather   "  • No Known Problems Paternal Grandmother    • No Known Problems Paternal Grandfather    • No Known Problems Cousin    • Rheum arthritis Neg Hx    • Osteoarthritis Neg Hx    • Asthma Neg Hx    • Diabetes Neg Hx    • Heart failure Neg Hx    • Hyperlipidemia Neg Hx    • Hypertension Neg Hx    • Migraines Neg Hx    • Rashes / Skin problems Neg Hx    • Seizures Neg Hx    • Stroke Neg Hx    • Thyroid disease Neg Hx        Social History     Socioeconomic History   • Marital status:      Spouse name: Not on file   • Number of children: Not on file   • Years of education: Not on file   • Highest education level: Not on file   Tobacco Use   • Smoking status: Never Smoker   • Smokeless tobacco: Never Used   Substance and Sexual Activity   • Alcohol use: No   • Drug use: Yes     Comment: Pt has track marks on right arm. Pt states \"It's from my INR being drawn.\"    • Sexual activity: Defer           Objective   Physical Exam   Constitutional: She is oriented to person, place, and time. She appears well-developed and well-nourished. No distress.   HENT:   Head: Normocephalic and atraumatic.   Right Ear: External ear normal.   Left Ear: External ear normal.   Nose: Nose normal.   Mouth/Throat: Oropharynx is clear and moist.   Eyes: Pupils are equal, round, and reactive to light. Conjunctivae and EOM are normal.   Neck: Normal range of motion. Neck supple.   Cardiovascular: Normal rate, regular rhythm, normal heart sounds and intact distal pulses.   Pulmonary/Chest: Effort normal and breath sounds normal.   Abdominal: Soft. Bowel sounds are normal.   Musculoskeletal: Normal range of motion.   Neurological: She is alert and oriented to person, place, and time.   Skin: Skin is warm and dry. Capillary refill takes less than 2 seconds. She is not diaphoretic.   Nursing note and vitals reviewed.      Procedures           ED Course  ED Course as of Mar 13 1556   Fri Mar 13, 2020   1555 Discussed imaging with patient. She " declined. States she needs to leave and go get her son.  Pt instructed to keep urology appt. Discussed sx that would warrant return tot he ED.     [ML]      ED Course User Index  [ML] Georgie Mckenzie PA                                           MDM  Number of Diagnoses or Management Options  Renal colic:      Amount and/or Complexity of Data Reviewed  Clinical lab tests: ordered and reviewed    Risk of Complications, Morbidity, and/or Mortality  Presenting problems: minimal  Diagnostic procedures: minimal  Management options: minimal    Patient Progress  Patient progress: improved      Final diagnoses:   Renal colic            Georgie Mckenzie PA  03/13/20 1552

## 2020-04-19 ENCOUNTER — HOSPITAL ENCOUNTER (EMERGENCY)
Facility: HOSPITAL | Age: 35
Discharge: HOME | End: 2020-04-19
Attending: FAMILY MEDICINE
Payer: MEDICAID

## 2020-04-19 VITALS
SYSTOLIC BLOOD PRESSURE: 193 MMHG | DIASTOLIC BLOOD PRESSURE: 77 MMHG | RESPIRATION RATE: 20 BRPM | OXYGEN SATURATION: 95 % | TEMPERATURE: 98.1 F | HEART RATE: 93 BPM | WEIGHT: 293 LBS | BODY MASS INDEX: 50.02 KG/M2 | HEIGHT: 64 IN

## 2020-04-19 LAB
BILIRUBIN URINE: NEGATIVE
BLOOD, URINE: ABNORMAL
CLARITY: ABNORMAL
COLOR: ABNORMAL
EPITHELIAL CELLS, UA: ABNORMAL /HPF (ref 0–5)
GLUCOSE URINE: NEGATIVE MG/DL
KETONES, URINE: NEGATIVE MG/DL
LEUKOCYTE ESTERASE, URINE: NEGATIVE
MICROSCOPIC EXAMINATION: YES
NITRITE, URINE: NEGATIVE
PH UA: 7 (ref 5–8)
PROTEIN UA: 30 MG/DL
RBC UA: >100 /HPF (ref 0–4)
SPECIFIC GRAVITY UA: 1.01 (ref 1–1.03)
URINE REFLEX TO CULTURE: ABNORMAL
URINE TYPE: ABNORMAL
UROBILINOGEN, URINE: 1 E.U./DL
WBC UA: ABNORMAL /HPF (ref 0–5)

## 2020-04-19 PROCEDURE — 81001 URINALYSIS AUTO W/SCOPE: CPT

## 2020-04-19 PROCEDURE — 99283 EMERGENCY DEPT VISIT LOW MDM: CPT

## 2020-04-19 RX ORDER — OMEGA-3 FATTY ACIDS CAP DELAYED RELEASE 1000 MG 1000 MG
3000 CAPSULE DELAYED RELEASE ORAL 4 TIMES DAILY
COMMUNITY

## 2020-04-19 RX ORDER — TIZANIDINE 4 MG/1
4 TABLET ORAL 2 TIMES DAILY
COMMUNITY

## 2020-04-19 RX ORDER — PROMETHAZINE HYDROCHLORIDE 25 MG/1
25 TABLET ORAL 3 TIMES DAILY PRN
Qty: 12 TABLET | Refills: 0 | Status: SHIPPED | OUTPATIENT
Start: 2020-04-19 | End: 2020-04-26

## 2020-04-19 RX ORDER — FLUOXETINE HYDROCHLORIDE 40 MG/1
40 CAPSULE ORAL DAILY
COMMUNITY

## 2020-04-19 RX ORDER — HYDROCHLOROTHIAZIDE 25 MG/1
25 TABLET ORAL DAILY
COMMUNITY

## 2020-04-19 RX ORDER — ALLOPURINOL 300 MG/1
300 TABLET ORAL DAILY
COMMUNITY

## 2020-04-19 RX ORDER — LEVOTHYROXINE SODIUM 0.03 MG/1
25 TABLET ORAL DAILY
COMMUNITY

## 2020-04-19 RX ORDER — OMEPRAZOLE 20 MG/1
20 CAPSULE, DELAYED RELEASE ORAL DAILY
COMMUNITY

## 2020-04-19 RX ORDER — CETIRIZINE HYDROCHLORIDE 10 MG/1
10 TABLET ORAL DAILY
COMMUNITY

## 2020-04-19 RX ORDER — FOLIC ACID 1 MG/1
1 TABLET ORAL DAILY
COMMUNITY

## 2020-04-19 RX ORDER — FERROUS SULFATE 325(65) MG
325 TABLET ORAL
COMMUNITY

## 2020-04-19 RX ORDER — WARFARIN SODIUM 10 MG/1
10 TABLET ORAL DAILY
COMMUNITY

## 2020-04-19 RX ORDER — ATORVASTATIN CALCIUM 20 MG/1
20 TABLET, FILM COATED ORAL DAILY
COMMUNITY

## 2020-04-19 RX ORDER — METOPROLOL SUCCINATE 25 MG/1
25 TABLET, EXTENDED RELEASE ORAL DAILY
COMMUNITY

## 2020-04-19 RX ORDER — M-VIT,TX,IRON,MINS/CALC/FOLIC 27MG-0.4MG
1 TABLET ORAL DAILY
COMMUNITY

## 2020-04-19 RX ORDER — MONTELUKAST SODIUM 10 MG/1
10 TABLET ORAL NIGHTLY
COMMUNITY

## 2020-04-19 RX ORDER — TOPIRAMATE 100 MG/1
100 TABLET, FILM COATED ORAL 2 TIMES DAILY
COMMUNITY

## 2020-04-19 RX ORDER — ALBUTEROL SULFATE 90 UG/1
2 AEROSOL, METERED RESPIRATORY (INHALATION) EVERY 6 HOURS PRN
COMMUNITY

## 2020-04-19 SDOH — HEALTH STABILITY: MENTAL HEALTH: HOW OFTEN DO YOU HAVE A DRINK CONTAINING ALCOHOL?: NEVER

## 2020-04-19 ASSESSMENT — PAIN DESCRIPTION - ORIENTATION: ORIENTATION: LEFT

## 2020-04-19 ASSESSMENT — ENCOUNTER SYMPTOMS
ABDOMINAL PAIN: 0
SHORTNESS OF BREATH: 0
NAUSEA: 1
VOMITING: 1

## 2020-04-19 ASSESSMENT — PAIN DESCRIPTION - DESCRIPTORS: DESCRIPTORS: STABBING;SHARP

## 2020-04-19 ASSESSMENT — PAIN DESCRIPTION - PROGRESSION: CLINICAL_PROGRESSION: GRADUALLY WORSENING

## 2020-04-19 ASSESSMENT — PAIN DESCRIPTION - LOCATION: LOCATION: FLANK

## 2020-04-19 ASSESSMENT — PAIN SCALES - GENERAL: PAINLEVEL_OUTOF10: 9

## 2020-04-19 ASSESSMENT — PAIN DESCRIPTION - FREQUENCY: FREQUENCY: CONTINUOUS

## 2020-04-19 ASSESSMENT — PAIN DESCRIPTION - PAIN TYPE: TYPE: ACUTE PAIN

## 2020-04-19 ASSESSMENT — PAIN DESCRIPTION - DIRECTION: RADIATING_TOWARDS: GROIN

## 2020-04-19 NOTE — ED PROVIDER NOTES
[address] [place] [address] Court  eMERGENCY dEPARTMENT eNCOUnter      Pt Name: Ana Cervantes  MRN: [id number]  [place] 1985  Date of evaluation: 2020  Provider: Ericka López MD    [address] [place] [address]       Chief Complaint   Patient presents with    Flank Pain         HISTORY OF PRESENT ILLNESS   (Location/Symptom, Timing/Onset, Context/Setting, Quality, Duration, Modifying Factors, Severity)  Note limiting factors. Ana Cervantes is a 30 y.o. female who presents to the emergency department complaining about left flank pain which radiates down to her left groin area. She also complains about hematuria. She states she has a history of kidney stones yet the past for CT scans she's had revealed no kidney stones or ureteral stones. She has secured narcotic prescriptions from 12 different doctors using 5 different pharmacies. Nursing Notes were reviewed. REVIEW OF SYSTEMS    (2-9 systems for level 4, 10 or more forlevel 5)     Review of Systems   Constitutional: Negative for chills and fever. Respiratory: Negative for shortness of breath. Cardiovascular: Negative for chest pain. Gastrointestinal: Positive for nausea and vomiting. Negative for abdominal pain. Genitourinary: Positive for flank pain and hematuria. Negative for frequency and urgency. Except as noted above the remainder of the review of systems was reviewed and negative.        PAST MEDICAL HISTORY     Past Medical History:   Diagnosis Date    [facility]fib ([facility]     Anxiety     Asthma     Diabetes mellitus ([facility]     Factor 5 Leiden mutation, heterozygous ([facility]     GERD (gastroesophageal reflux disease)     Gout     Hypertension     Kidney stones     Thyroid disease          SURGICAL HISTORY       Past Surgical History:   Procedure Laterality Date     SECTION      x 2    CHOLECYSTECTOMY           CURRENT MEDICATIONS       Previous Medications    No medications on file       ALLERGIES     Toradol [ketorolac

## 2020-04-19 NOTE — ED NOTES
Patient to bathroom to collect urine for ua, clean catch instructions reviewed.      Uzma Allan RN  04/19/20 2694

## 2020-04-19 NOTE — ED NOTES
AVS and Rx reviewed with patient, understanding verbalized. Patient discharged home with no further needs or concerns voiced.  Patient encouraged to follow up with her pcp / urologist.     Gilma Monsalve RN  04/19/20 4147

## 2020-04-26 ENCOUNTER — APPOINTMENT (OUTPATIENT)
Dept: CT IMAGING | Facility: HOSPITAL | Age: 35
End: 2020-04-26

## 2020-04-26 ENCOUNTER — HOSPITAL ENCOUNTER (EMERGENCY)
Facility: HOSPITAL | Age: 35
Discharge: HOME | End: 2020-04-27
Attending: EMERGENCY MEDICINE | Admitting: EMERGENCY MEDICINE

## 2020-04-26 DIAGNOSIS — N23 RENAL COLIC: Primary | ICD-10-CM

## 2020-04-26 DIAGNOSIS — N30.01 ACUTE CYSTITIS WITH HEMATURIA: ICD-10-CM

## 2020-04-26 DIAGNOSIS — R73.09 UNCONTROLLED BLOOD GLUCOSE: ICD-10-CM

## 2020-04-26 LAB
ALBUMIN SERPL-MCNC: 4.04 G/DL (ref 3.5–5.2)
ALBUMIN/GLOB SERPL: 1.2 G/DL
ALP SERPL-CCNC: 86 U/L (ref 39–117)
ALT SERPL W P-5'-P-CCNC: 42 U/L (ref 1–33)
ANION GAP SERPL CALCULATED.3IONS-SCNC: 20.7 MMOL/L (ref 5–15)
APTT PPP: 30.1 SECONDS (ref 23.8–36.1)
AST SERPL-CCNC: 74 U/L (ref 1–32)
B-HCG UR QL: NEGATIVE
BACTERIA UR QL AUTO: ABNORMAL /HPF
BASOPHILS # BLD AUTO: 0.02 10*3/MM3 (ref 0–0.2)
BASOPHILS NFR BLD AUTO: 0.5 % (ref 0–1.5)
BILIRUB SERPL-MCNC: 0.4 MG/DL (ref 0.2–1.2)
BILIRUB UR QL STRIP: NEGATIVE
BUN BLD-MCNC: 8 MG/DL (ref 6–20)
BUN/CREAT SERPL: 14.3 (ref 7–25)
CALCIUM SPEC-SCNC: 9.2 MG/DL (ref 8.6–10.5)
CHLORIDE SERPL-SCNC: 97 MMOL/L (ref 98–107)
CLARITY UR: CLEAR
CO2 SERPL-SCNC: 20.3 MMOL/L (ref 22–29)
COLOR UR: ABNORMAL
CREAT BLD-MCNC: 0.56 MG/DL (ref 0.57–1)
DEPRECATED RDW RBC AUTO: 49.1 FL (ref 37–54)
EOSINOPHIL # BLD AUTO: 0.05 10*3/MM3 (ref 0–0.4)
EOSINOPHIL NFR BLD AUTO: 1.3 % (ref 0.3–6.2)
ERYTHROCYTE [DISTWIDTH] IN BLOOD BY AUTOMATED COUNT: 15.3 % (ref 12.3–15.4)
GFR SERPL CREATININE-BSD FRML MDRD: 124 ML/MIN/1.73
GLOBULIN UR ELPH-MCNC: 3.4 GM/DL
GLUCOSE BLD-MCNC: 333 MG/DL (ref 65–99)
GLUCOSE UR STRIP-MCNC: NEGATIVE MG/DL
HCT VFR BLD AUTO: 35.4 % (ref 34–46.6)
HGB BLD-MCNC: 11.6 G/DL (ref 12–15.9)
HGB UR QL STRIP.AUTO: ABNORMAL
HYALINE CASTS UR QL AUTO: ABNORMAL /LPF
IMM GRANULOCYTES # BLD AUTO: 0.02 10*3/MM3 (ref 0–0.05)
IMM GRANULOCYTES NFR BLD AUTO: 0.5 % (ref 0–0.5)
INR PPP: 1.19 (ref 0.9–1.1)
KETONES UR QL STRIP: NEGATIVE
LEUKOCYTE ESTERASE UR QL STRIP.AUTO: ABNORMAL
LIPASE SERPL-CCNC: 33 U/L (ref 13–60)
LYMPHOCYTES # BLD AUTO: 0.73 10*3/MM3 (ref 0.7–3.1)
LYMPHOCYTES NFR BLD AUTO: 19 % (ref 19.6–45.3)
MCH RBC QN AUTO: 29.9 PG (ref 26.6–33)
MCHC RBC AUTO-ENTMCNC: 32.8 G/DL (ref 31.5–35.7)
MCV RBC AUTO: 91.2 FL (ref 79–97)
MONOCYTES # BLD AUTO: 0.3 10*3/MM3 (ref 0.1–0.9)
MONOCYTES NFR BLD AUTO: 7.8 % (ref 5–12)
NEUTROPHILS # BLD AUTO: 2.72 10*3/MM3 (ref 1.7–7)
NEUTROPHILS NFR BLD AUTO: 70.9 % (ref 42.7–76)
NITRITE UR QL STRIP: NEGATIVE
NRBC BLD AUTO-RTO: 0 /100 WBC (ref 0–0.2)
PH UR STRIP.AUTO: 6.5 [PH] (ref 5–8)
PLATELET # BLD AUTO: 173 10*3/MM3 (ref 140–450)
PMV BLD AUTO: 9.6 FL (ref 6–12)
POTASSIUM BLD-SCNC: 3.7 MMOL/L (ref 3.5–5.2)
PROT SERPL-MCNC: 7.4 G/DL (ref 6–8.5)
PROT UR QL STRIP: ABNORMAL
PROTHROMBIN TIME: 15.7 SECONDS (ref 11–15.4)
RBC # BLD AUTO: 3.88 10*6/MM3 (ref 3.77–5.28)
RBC # UR: ABNORMAL /HPF
REF LAB TEST METHOD: ABNORMAL
SODIUM BLD-SCNC: 138 MMOL/L (ref 136–145)
SP GR UR STRIP: 1.01 (ref 1–1.03)
SQUAMOUS #/AREA URNS HPF: ABNORMAL /HPF
UROBILINOGEN UR QL STRIP: ABNORMAL
WBC NRBC COR # BLD: 3.84 10*3/MM3 (ref 3.4–10.8)
WBC UR QL AUTO: ABNORMAL /HPF

## 2020-04-26 PROCEDURE — 74176 CT ABD & PELVIS W/O CONTRAST: CPT

## 2020-04-26 PROCEDURE — 81025 URINE PREGNANCY TEST: CPT | Performed by: EMERGENCY MEDICINE

## 2020-04-26 PROCEDURE — 25010000002 HYDROMORPHONE 1 MG/ML SOLUTION: Performed by: EMERGENCY MEDICINE

## 2020-04-26 PROCEDURE — 63710000001 ONDANSETRON ODT 4 MG TABLET DISPERSIBLE: Performed by: EMERGENCY MEDICINE

## 2020-04-26 PROCEDURE — 83690 ASSAY OF LIPASE: CPT | Performed by: EMERGENCY MEDICINE

## 2020-04-26 PROCEDURE — 96372 THER/PROPH/DIAG INJ SC/IM: CPT

## 2020-04-26 PROCEDURE — 99284 EMERGENCY DEPT VISIT MOD MDM: CPT

## 2020-04-26 PROCEDURE — 85610 PROTHROMBIN TIME: CPT | Performed by: EMERGENCY MEDICINE

## 2020-04-26 PROCEDURE — 85730 THROMBOPLASTIN TIME PARTIAL: CPT | Performed by: EMERGENCY MEDICINE

## 2020-04-26 PROCEDURE — 81001 URINALYSIS AUTO W/SCOPE: CPT | Performed by: EMERGENCY MEDICINE

## 2020-04-26 PROCEDURE — 80053 COMPREHEN METABOLIC PANEL: CPT | Performed by: EMERGENCY MEDICINE

## 2020-04-26 PROCEDURE — 36415 COLL VENOUS BLD VENIPUNCTURE: CPT

## 2020-04-26 PROCEDURE — 85025 COMPLETE CBC W/AUTO DIFF WBC: CPT | Performed by: EMERGENCY MEDICINE

## 2020-04-26 RX ORDER — HYDROCODONE BITARTRATE AND ACETAMINOPHEN 5; 325 MG/1; MG/1
1 TABLET ORAL ONCE
Status: COMPLETED | OUTPATIENT
Start: 2020-04-26 | End: 2020-04-26

## 2020-04-26 RX ORDER — ONDANSETRON 4 MG/1
4 TABLET, ORALLY DISINTEGRATING ORAL ONCE
Status: COMPLETED | OUTPATIENT
Start: 2020-04-26 | End: 2020-04-26

## 2020-04-26 RX ADMIN — HYDROCODONE BITARTRATE AND ACETAMINOPHEN 1 TABLET: 5; 325 TABLET ORAL at 22:12

## 2020-04-26 RX ADMIN — ONDANSETRON 4 MG: 4 TABLET, ORALLY DISINTEGRATING ORAL at 22:12

## 2020-04-26 RX ADMIN — HYDROMORPHONE HYDROCHLORIDE 1 MG: 1 INJECTION, SOLUTION INTRAMUSCULAR; INTRAVENOUS; SUBCUTANEOUS at 23:46

## 2020-04-27 VITALS
HEIGHT: 64 IN | OXYGEN SATURATION: 98 % | BODY MASS INDEX: 50.02 KG/M2 | WEIGHT: 293 LBS | DIASTOLIC BLOOD PRESSURE: 104 MMHG | SYSTOLIC BLOOD PRESSURE: 183 MMHG | RESPIRATION RATE: 18 BRPM | TEMPERATURE: 98 F | HEART RATE: 92 BPM

## 2020-04-27 RX ORDER — NITROFURANTOIN 25; 75 MG/1; MG/1
100 CAPSULE ORAL 2 TIMES DAILY
Qty: 20 CAPSULE | Refills: 0 | OUTPATIENT
Start: 2020-04-27 | End: 2020-07-05

## 2020-04-27 RX ORDER — NITROFURANTOIN 25; 75 MG/1; MG/1
100 CAPSULE ORAL ONCE
Status: COMPLETED | OUTPATIENT
Start: 2020-04-27 | End: 2020-04-27

## 2020-04-27 RX ADMIN — NITROFURANTOIN MONOHYDRATE/MACROCRYSTALLINE 100 MG: 25; 75 CAPSULE ORAL at 00:43

## 2020-04-27 NOTE — ED PROVIDER NOTES
Subjective   35-year-old female in the emergency department reporting left and right flank pain as well as suprapubic tenderness for the last 2 to 3 days.  Patient reports chronic flank pain secondary to kidney stones.  Denies nausea, vomiting, diarrhea, fever, chills, shortness of breath, or chest pain.  Patient also reports that she has had painful urination.  Patient has extensive past medical history so please refer to the chart.      History provided by:  Patient   used: No    Abdominal Pain   Pain location:  R flank and L flank  Pain quality: aching, cramping and dull    Pain radiates to:  Suprapubic region  Pain severity:  Mild  Onset quality:  Gradual  Timing:  Constant  Progression:  Worsening  Chronicity:  Chronic  Context: not alcohol use, not awakening from sleep, not diet changes, not eating, not laxative use, not medication withdrawal, not previous surgeries, not recent illness, not recent sexual activity, not retching, not sick contacts, not suspicious food intake and not trauma    Relieved by:  Nothing  Worsened by:  Nothing  Ineffective treatments:  None tried  Associated symptoms: no anorexia, no belching, no chest pain, no chills, no constipation, no cough, no diarrhea, no dysuria, no fatigue, no fever, no flatus, no hematemesis, no hematochezia, no hematuria, no melena, no nausea, no shortness of breath, no sore throat, no vaginal bleeding, no vaginal discharge and no vomiting    Risk factors: obesity    Risk factors: no alcohol abuse, no aspirin use, not elderly, has not had multiple surgeries, no NSAID use, not pregnant and no recent hospitalization        Review of Systems   Constitutional: Negative for chills, fatigue and fever.   HENT: Negative for sore throat.    Respiratory: Negative for cough and shortness of breath.    Cardiovascular: Negative for chest pain.   Gastrointestinal: Positive for abdominal pain. Negative for anorexia, constipation, diarrhea, flatus,  hematemesis, hematochezia, melena, nausea and vomiting.   Genitourinary: Negative for dysuria, hematuria, vaginal bleeding and vaginal discharge.   All other systems reviewed and are negative.      Past Medical History:   Diagnosis Date   • Abnormal ECG    • Anemia    • Anxiety    • Asthma    • Depression    • Diabetes mellitus (CMS/HCC)    • DVT (deep venous thrombosis) (CMS/HCC)    • Factor 5 Leiden mutation, heterozygous (CMS/HCC)    • Fibroid    • GERD (gastroesophageal reflux disease)    • Gestational diabetes    • Gout    • Hyperlipidemia    • Hypothyroid    • Kidney stone    • Neuropathy    • Ovarian cyst    • PE (pulmonary embolism)    • Polycystic ovary syndrome    • Preeclampsia    • Rh incompatibility    • Urinary tract infection    • Varicella        Allergies   Allergen Reactions   • Amoxicillin Hives   • Haldol [Haloperidol] Hives   • Penicillins Hives   • Toradol [Ketorolac Tromethamine] Hives   • Tramadol Swelling       Past Surgical History:   Procedure Laterality Date   • CARDIAC SURGERY      Heart Cath done in    •  SECTION     • CHOLECYSTECTOMY     • COLONOSCOPY         Family History   Problem Relation Age of Onset   • No Known Problems Mother    • No Known Problems Father    • No Known Problems Sister    • No Known Problems Brother    • No Known Problems Son    • No Known Problems Daughter    • No Known Problems Maternal Grandmother    • No Known Problems Maternal Grandfather    • No Known Problems Paternal Grandmother    • No Known Problems Paternal Grandfather    • No Known Problems Cousin    • Rheum arthritis Neg Hx    • Osteoarthritis Neg Hx    • Asthma Neg Hx    • Diabetes Neg Hx    • Heart failure Neg Hx    • Hyperlipidemia Neg Hx    • Hypertension Neg Hx    • Migraines Neg Hx    • Rashes / Skin problems Neg Hx    • Seizures Neg Hx    • Stroke Neg Hx    • Thyroid disease Neg Hx        Social History     Socioeconomic History   • Marital status:      Spouse name: Not on  "file   • Number of children: Not on file   • Years of education: Not on file   • Highest education level: Not on file   Tobacco Use   • Smoking status: Never Smoker   • Smokeless tobacco: Never Used   Substance and Sexual Activity   • Alcohol use: No   • Drug use: Yes     Comment: Pt has track marks on right arm. Pt states \"It's from my INR being drawn.\"    • Sexual activity: Defer           Objective   Physical Exam   Constitutional: She is oriented to person, place, and time. She appears well-developed and well-nourished.  Non-toxic appearance. No distress.   HENT:   Head: Normocephalic and atraumatic.   Right Ear: External ear normal.   Left Ear: External ear normal.   Nose: Nose normal.   Mouth/Throat: Oropharynx is clear and moist and mucous membranes are normal. No oropharyngeal exudate. No tonsillar exudate.   Eyes: Pupils are equal, round, and reactive to light. Conjunctivae, EOM and lids are normal.   Neck: Normal range of motion and full passive range of motion without pain. Neck supple. No thyromegaly present.   Cardiovascular: Normal rate, regular rhythm, S1 normal, S2 normal, normal heart sounds, intact distal pulses and normal pulses.   Pulmonary/Chest: Effort normal and breath sounds normal. No tachypnea. No respiratory distress. She has no decreased breath sounds. She has no wheezes. She has no rales. She exhibits no tenderness.   Abdominal: Soft. Normal appearance and bowel sounds are normal. She exhibits no distension. There is tenderness. There is no rebound and no guarding.   Musculoskeletal: Normal range of motion. She exhibits no edema, tenderness or deformity.   Lymphadenopathy:     She has no cervical adenopathy.   Neurological: She is alert and oriented to person, place, and time. She has normal strength. No cranial nerve deficit or sensory deficit. GCS eye subscore is 4. GCS verbal subscore is 5. GCS motor subscore is 6.   Skin: Skin is warm, dry and intact. No rash noted. She is not " diaphoretic. No erythema. No pallor.   Psychiatric: She has a normal mood and affect. Her speech is normal and behavior is normal. Judgment and thought content normal. Cognition and memory are normal.   Nursing note and vitals reviewed.      Procedures           ED Course  ED Course as of Apr 27 0228   Mon Apr 27, 2020   0023 IMPRESSION:  1.  No acute abnormality within the abdomen or pelvis.  2.   Hepatosplenomegaly and geographic regions of hepatic steatosis. Cholecystectomy.  3.  Nonobstructing left renal calculus.  4.  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   CT Abdomen Pelvis Without Contrast [ES]   0227 Patient reports feeling better with treatment in the emergency department, and is requested to be discharged home.  She is to return the emergency department with any worsening symptoms.    [ES]      ED Course User Index  [ES] Martir Razo MD                                           MDM  Number of Diagnoses or Management Options  Acute cystitis with hematuria: new and requires workup  Renal colic: new and requires workup  Uncontrolled blood glucose: new and requires workup     Amount and/or Complexity of Data Reviewed  Clinical lab tests: reviewed and ordered  Tests in the radiology section of CPT®: reviewed and ordered  Tests in the medicine section of CPT®: ordered and reviewed  Review and summarize past medical records: yes  Independent visualization of images, tracings, or specimens: yes    Risk of Complications, Morbidity, and/or Mortality  Presenting problems: moderate  Diagnostic procedures: moderate  Management options: moderate    Patient Progress  Patient progress: stable      Final diagnoses:   Renal colic   Acute cystitis with hematuria   Uncontrolled blood glucose            Martir Razo MD  04/27/20 0228

## 2020-05-26 ENCOUNTER — HOSPITAL ENCOUNTER (EMERGENCY)
Facility: HOSPITAL | Age: 35
Discharge: LEFT BEFORE DISCHARGE | End: 2020-05-27

## 2020-05-26 VITALS
WEIGHT: 293 LBS | TEMPERATURE: 98.5 F | BODY MASS INDEX: 50.02 KG/M2 | HEART RATE: 103 BPM | HEIGHT: 64 IN | OXYGEN SATURATION: 95 % | RESPIRATION RATE: 18 BRPM | DIASTOLIC BLOOD PRESSURE: 90 MMHG | SYSTOLIC BLOOD PRESSURE: 205 MMHG

## 2020-06-14 ENCOUNTER — HOSPITAL ENCOUNTER (EMERGENCY)
Facility: HOSPITAL | Age: 35
Discharge: HOME | End: 2020-06-15
Attending: EMERGENCY MEDICINE | Admitting: EMERGENCY MEDICINE

## 2020-06-14 DIAGNOSIS — R30.0 DYSURIA: ICD-10-CM

## 2020-06-14 DIAGNOSIS — N30.01 ACUTE CYSTITIS WITH HEMATURIA: ICD-10-CM

## 2020-06-14 DIAGNOSIS — N23 RENAL COLIC: ICD-10-CM

## 2020-06-14 DIAGNOSIS — R10.9 ACUTE LEFT FLANK PAIN: Primary | ICD-10-CM

## 2020-06-14 PROCEDURE — 96375 TX/PRO/DX INJ NEW DRUG ADDON: CPT

## 2020-06-14 PROCEDURE — 96365 THER/PROPH/DIAG IV INF INIT: CPT

## 2020-06-14 PROCEDURE — 99284 EMERGENCY DEPT VISIT MOD MDM: CPT

## 2020-06-15 ENCOUNTER — APPOINTMENT (OUTPATIENT)
Dept: CT IMAGING | Facility: HOSPITAL | Age: 35
End: 2020-06-15

## 2020-06-15 VITALS
TEMPERATURE: 98.8 F | OXYGEN SATURATION: 98 % | HEART RATE: 110 BPM | DIASTOLIC BLOOD PRESSURE: 96 MMHG | RESPIRATION RATE: 18 BRPM | BODY MASS INDEX: 50.02 KG/M2 | SYSTOLIC BLOOD PRESSURE: 154 MMHG | HEIGHT: 64 IN | WEIGHT: 293 LBS

## 2020-06-15 LAB
ALBUMIN SERPL-MCNC: 4.39 G/DL (ref 3.5–5.2)
ALBUMIN/GLOB SERPL: 1.3 G/DL
ALP SERPL-CCNC: 93 U/L (ref 39–117)
ALT SERPL W P-5'-P-CCNC: 44 U/L (ref 1–33)
ANION GAP SERPL CALCULATED.3IONS-SCNC: 17.6 MMOL/L (ref 5–15)
APTT PPP: 27.6 SECONDS (ref 25.6–35.3)
AST SERPL-CCNC: 61 U/L (ref 1–32)
B-HCG UR QL: NEGATIVE
BACTERIA UR QL AUTO: ABNORMAL /HPF
BASOPHILS # BLD AUTO: 0.03 10*3/MM3 (ref 0–0.2)
BASOPHILS NFR BLD AUTO: 0.6 % (ref 0–1.5)
BILIRUB SERPL-MCNC: 0.6 MG/DL (ref 0.2–1.2)
BILIRUB UR QL STRIP: ABNORMAL
BUN BLD-MCNC: 8 MG/DL (ref 6–20)
BUN/CREAT SERPL: 14.8 (ref 7–25)
CALCIUM SPEC-SCNC: 9.9 MG/DL (ref 8.6–10.5)
CHLORIDE SERPL-SCNC: 94 MMOL/L (ref 98–107)
CLARITY UR: CLEAR
CO2 SERPL-SCNC: 22.4 MMOL/L (ref 22–29)
COLOR UR: ABNORMAL
CREAT BLD-MCNC: 0.54 MG/DL (ref 0.57–1)
CRP SERPL-MCNC: 3.27 MG/DL (ref 0–0.5)
D-LACTATE SERPL-SCNC: 3.6 MMOL/L (ref 0.5–2)
DEPRECATED RDW RBC AUTO: 47.8 FL (ref 37–54)
EOSINOPHIL # BLD AUTO: 0.07 10*3/MM3 (ref 0–0.4)
EOSINOPHIL NFR BLD AUTO: 1.4 % (ref 0.3–6.2)
ERYTHROCYTE [DISTWIDTH] IN BLOOD BY AUTOMATED COUNT: 14.4 % (ref 12.3–15.4)
ERYTHROCYTE [SEDIMENTATION RATE] IN BLOOD: 30 MM/HR (ref 0–20)
GFR SERPL CREATININE-BSD FRML MDRD: 129 ML/MIN/1.73
GLOBULIN UR ELPH-MCNC: 3.5 GM/DL
GLUCOSE BLD-MCNC: 415 MG/DL (ref 65–99)
GLUCOSE BLDC GLUCOMTR-MCNC: 332 MG/DL (ref 70–130)
GLUCOSE UR STRIP-MCNC: NEGATIVE MG/DL
HCT VFR BLD AUTO: 38.2 % (ref 34–46.6)
HGB BLD-MCNC: 12.7 G/DL (ref 12–15.9)
HGB UR QL STRIP.AUTO: ABNORMAL
HYALINE CASTS UR QL AUTO: ABNORMAL /LPF
IMM GRANULOCYTES # BLD AUTO: 0.01 10*3/MM3 (ref 0–0.05)
IMM GRANULOCYTES NFR BLD AUTO: 0.2 % (ref 0–0.5)
INR PPP: 0.91 (ref 0.9–1.1)
KETONES UR QL STRIP: NEGATIVE
LACTATE HOLD SPECIMEN: NORMAL
LEUKOCYTE ESTERASE UR QL STRIP.AUTO: ABNORMAL
LIPASE SERPL-CCNC: 35 U/L (ref 13–60)
LYMPHOCYTES # BLD AUTO: 0.93 10*3/MM3 (ref 0.7–3.1)
LYMPHOCYTES NFR BLD AUTO: 18.1 % (ref 19.6–45.3)
MCH RBC QN AUTO: 30.5 PG (ref 26.6–33)
MCHC RBC AUTO-ENTMCNC: 33.2 G/DL (ref 31.5–35.7)
MCV RBC AUTO: 91.6 FL (ref 79–97)
MONOCYTES # BLD AUTO: 0.45 10*3/MM3 (ref 0.1–0.9)
MONOCYTES NFR BLD AUTO: 8.8 % (ref 5–12)
NEUTROPHILS # BLD AUTO: 3.64 10*3/MM3 (ref 1.7–7)
NEUTROPHILS NFR BLD AUTO: 70.9 % (ref 42.7–76)
NITRITE UR QL STRIP: NEGATIVE
NRBC BLD AUTO-RTO: 0 /100 WBC (ref 0–0.2)
PH UR STRIP.AUTO: 5.5 [PH] (ref 5–8)
PLATELET # BLD AUTO: 242 10*3/MM3 (ref 140–450)
PMV BLD AUTO: 9.8 FL (ref 6–12)
POTASSIUM BLD-SCNC: 4.5 MMOL/L (ref 3.5–5.2)
PROT SERPL-MCNC: 7.9 G/DL (ref 6–8.5)
PROT UR QL STRIP: ABNORMAL
PROTHROMBIN TIME: 12 SECONDS (ref 11.9–14.1)
RBC # BLD AUTO: 4.17 10*6/MM3 (ref 3.77–5.28)
RBC # UR: ABNORMAL /HPF
REF LAB TEST METHOD: ABNORMAL
SODIUM BLD-SCNC: 134 MMOL/L (ref 136–145)
SP GR UR STRIP: 1.02 (ref 1–1.03)
SQUAMOUS #/AREA URNS HPF: ABNORMAL /HPF
UROBILINOGEN UR QL STRIP: ABNORMAL
WBC NRBC COR # BLD: 5.13 10*3/MM3 (ref 3.4–10.8)
WBC UR QL AUTO: ABNORMAL /HPF

## 2020-06-15 PROCEDURE — 83690 ASSAY OF LIPASE: CPT | Performed by: EMERGENCY MEDICINE

## 2020-06-15 PROCEDURE — 82962 GLUCOSE BLOOD TEST: CPT

## 2020-06-15 PROCEDURE — 85730 THROMBOPLASTIN TIME PARTIAL: CPT | Performed by: EMERGENCY MEDICINE

## 2020-06-15 PROCEDURE — 81001 URINALYSIS AUTO W/SCOPE: CPT | Performed by: EMERGENCY MEDICINE

## 2020-06-15 PROCEDURE — 25010000002 DIPHENHYDRAMINE PER 50 MG: Performed by: EMERGENCY MEDICINE

## 2020-06-15 PROCEDURE — 87040 BLOOD CULTURE FOR BACTERIA: CPT | Performed by: EMERGENCY MEDICINE

## 2020-06-15 PROCEDURE — 74176 CT ABD & PELVIS W/O CONTRAST: CPT

## 2020-06-15 PROCEDURE — 96365 THER/PROPH/DIAG IV INF INIT: CPT

## 2020-06-15 PROCEDURE — 96375 TX/PRO/DX INJ NEW DRUG ADDON: CPT

## 2020-06-15 PROCEDURE — 25010000002 PROMETHAZINE PER 50 MG: Performed by: EMERGENCY MEDICINE

## 2020-06-15 PROCEDURE — 25010000002 CEFTRIAXONE PER 250 MG: Performed by: EMERGENCY MEDICINE

## 2020-06-15 PROCEDURE — 85025 COMPLETE CBC W/AUTO DIFF WBC: CPT | Performed by: EMERGENCY MEDICINE

## 2020-06-15 PROCEDURE — 25010000002 HYDROMORPHONE 1 MG/ML SOLUTION: Performed by: EMERGENCY MEDICINE

## 2020-06-15 PROCEDURE — 25010000002 BUTORPHANOL PER 1 MG: Performed by: EMERGENCY MEDICINE

## 2020-06-15 PROCEDURE — 85652 RBC SED RATE AUTOMATED: CPT | Performed by: EMERGENCY MEDICINE

## 2020-06-15 PROCEDURE — 81025 URINE PREGNANCY TEST: CPT | Performed by: EMERGENCY MEDICINE

## 2020-06-15 PROCEDURE — 83605 ASSAY OF LACTIC ACID: CPT | Performed by: EMERGENCY MEDICINE

## 2020-06-15 PROCEDURE — 86140 C-REACTIVE PROTEIN: CPT | Performed by: EMERGENCY MEDICINE

## 2020-06-15 PROCEDURE — 63710000001 INSULIN REGULAR HUMAN PER 5 UNITS: Performed by: EMERGENCY MEDICINE

## 2020-06-15 PROCEDURE — 85610 PROTHROMBIN TIME: CPT | Performed by: EMERGENCY MEDICINE

## 2020-06-15 PROCEDURE — 80053 COMPREHEN METABOLIC PANEL: CPT | Performed by: EMERGENCY MEDICINE

## 2020-06-15 RX ORDER — PROMETHAZINE HYDROCHLORIDE 25 MG/ML
12.5 INJECTION, SOLUTION INTRAMUSCULAR; INTRAVENOUS ONCE
Status: COMPLETED | OUTPATIENT
Start: 2020-06-15 | End: 2020-06-15

## 2020-06-15 RX ORDER — DIPHENHYDRAMINE HYDROCHLORIDE 50 MG/ML
50 INJECTION INTRAMUSCULAR; INTRAVENOUS ONCE
Status: COMPLETED | OUTPATIENT
Start: 2020-06-15 | End: 2020-06-15

## 2020-06-15 RX ORDER — SODIUM CHLORIDE 0.9 % (FLUSH) 0.9 %
10 SYRINGE (ML) INJECTION AS NEEDED
Status: DISCONTINUED | OUTPATIENT
Start: 2020-06-15 | End: 2020-06-15 | Stop reason: HOSPADM

## 2020-06-15 RX ORDER — NITROFURANTOIN 25; 75 MG/1; MG/1
100 CAPSULE ORAL 2 TIMES DAILY
Qty: 20 CAPSULE | Refills: 0 | OUTPATIENT
Start: 2020-06-15 | End: 2020-07-05

## 2020-06-15 RX ADMIN — HYDROMORPHONE HYDROCHLORIDE 1 MG: 1 INJECTION, SOLUTION INTRAMUSCULAR; INTRAVENOUS; SUBCUTANEOUS at 01:04

## 2020-06-15 RX ADMIN — BUTORPHANOL TARTRATE 2 MG: 2 INJECTION, SOLUTION INTRAMUSCULAR; INTRAVENOUS at 02:20

## 2020-06-15 RX ADMIN — DIPHENHYDRAMINE HYDROCHLORIDE 50 MG: 50 INJECTION, SOLUTION INTRAMUSCULAR; INTRAVENOUS at 00:39

## 2020-06-15 RX ADMIN — CEFTRIAXONE 1 G: 1 INJECTION, POWDER, FOR SOLUTION INTRAMUSCULAR; INTRAVENOUS at 02:06

## 2020-06-15 RX ADMIN — HUMAN INSULIN 10 UNITS: 100 INJECTION, SOLUTION SUBCUTANEOUS at 02:06

## 2020-06-15 RX ADMIN — PROMETHAZINE HYDROCHLORIDE 12.5 MG: 25 INJECTION INTRAMUSCULAR; INTRAVENOUS at 00:37

## 2020-06-15 RX ADMIN — SODIUM CHLORIDE 1000 ML: 9 INJECTION, SOLUTION INTRAVENOUS at 00:37

## 2020-06-15 NOTE — ED NOTES
Dr. Reddy aware that patient is screening simple sepsis positive.      Shashank Uribe, RN  06/15/20 3118

## 2020-06-15 NOTE — ED NOTES
Patient alert and oriented at this time. Denies any needs. States that she is ready to be discharged.      Shashank Uribe RN  06/15/20 0259

## 2020-06-15 NOTE — ED NOTES
Patient's O2 saturation noted to drop to high 80's after stadol administration. Placed on 2L of O2 via NC at this time.     Shashank Uribe RN  06/15/20 0259

## 2020-06-16 NOTE — ED PROVIDER NOTES
Subjective   35-year-old female in the emergency department reporting left flank pain that radiates to her left abdomen that has been present for several days and getting worse.  He also reports painful urination with blood in it.  Patient has extensive past medical history so please refer to the chart.  Denies nausea, vomiting, diarrhea, fever, or chills.      History provided by:  Patient   used: No        Review of Systems   Constitutional: Negative for activity change, appetite change, chills, diaphoresis, fatigue and fever.   HENT: Negative for congestion, ear pain and sore throat.    Eyes: Negative for redness.   Respiratory: Negative for cough, chest tightness, shortness of breath and wheezing.    Cardiovascular: Negative for chest pain, palpitations and leg swelling.   Gastrointestinal: Positive for abdominal pain. Negative for diarrhea, nausea and vomiting.   Genitourinary: Positive for flank pain. Negative for dysuria and urgency.   Musculoskeletal: Negative for arthralgias, back pain, myalgias and neck pain.   Skin: Negative for pallor, rash and wound.   Neurological: Negative for dizziness, speech difficulty, weakness and headaches.   Psychiatric/Behavioral: Negative for agitation, behavioral problems, confusion and decreased concentration.   All other systems reviewed and are negative.      Past Medical History:   Diagnosis Date   • Abnormal ECG    • Anemia    • Anxiety    • Asthma    • Depression    • Diabetes mellitus (CMS/HCC)    • DVT (deep venous thrombosis) (CMS/HCC)    • Factor 5 Leiden mutation, heterozygous (CMS/HCC)    • Fibroid    • GERD (gastroesophageal reflux disease)    • Gestational diabetes    • Gout    • Hyperlipidemia    • Hypothyroid    • Kidney stone    • Neuropathy    • Ovarian cyst    • PE (pulmonary embolism)    • Polycystic ovary syndrome    • Preeclampsia    • Rh incompatibility    • Urinary tract infection    • Varicella        Allergies   Allergen  "Reactions   • Amoxicillin Hives   • Haldol [Haloperidol] Hives   • Penicillins Hives   • Toradol [Ketorolac Tromethamine] Hives   • Tramadol Swelling       Past Surgical History:   Procedure Laterality Date   • CARDIAC SURGERY      Heart Cath done in    •  SECTION     • CHOLECYSTECTOMY     • COLONOSCOPY         Family History   Problem Relation Age of Onset   • No Known Problems Mother    • No Known Problems Father    • No Known Problems Sister    • No Known Problems Brother    • No Known Problems Son    • No Known Problems Daughter    • No Known Problems Maternal Grandmother    • No Known Problems Maternal Grandfather    • No Known Problems Paternal Grandmother    • No Known Problems Paternal Grandfather    • No Known Problems Cousin    • Rheum arthritis Neg Hx    • Osteoarthritis Neg Hx    • Asthma Neg Hx    • Diabetes Neg Hx    • Heart failure Neg Hx    • Hyperlipidemia Neg Hx    • Hypertension Neg Hx    • Migraines Neg Hx    • Rashes / Skin problems Neg Hx    • Seizures Neg Hx    • Stroke Neg Hx    • Thyroid disease Neg Hx        Social History     Socioeconomic History   • Marital status:      Spouse name: Not on file   • Number of children: Not on file   • Years of education: Not on file   • Highest education level: Not on file   Tobacco Use   • Smoking status: Never Smoker   • Smokeless tobacco: Never Used   Substance and Sexual Activity   • Alcohol use: No   • Drug use: Yes     Comment: Pt has track marks on right arm. Pt states \"It's from my INR being drawn.\"    • Sexual activity: Defer           Objective   Physical Exam   Constitutional: She is oriented to person, place, and time. She appears well-developed and well-nourished.  Non-toxic appearance. No distress.   HENT:   Head: Normocephalic and atraumatic.   Right Ear: External ear normal.   Left Ear: External ear normal.   Nose: Nose normal.   Mouth/Throat: Oropharynx is clear and moist and mucous membranes are normal. No " oropharyngeal exudate. No tonsillar exudate.   Eyes: Pupils are equal, round, and reactive to light. Conjunctivae, EOM and lids are normal.   Neck: Normal range of motion and full passive range of motion without pain. Neck supple. No thyromegaly present.   Cardiovascular: Normal rate, regular rhythm, S1 normal, S2 normal, normal heart sounds, intact distal pulses and normal pulses.   Pulmonary/Chest: Effort normal and breath sounds normal. No tachypnea. No respiratory distress. She has no decreased breath sounds. She has no wheezes. She has no rales. She exhibits no tenderness.   Abdominal: Soft. Normal appearance and bowel sounds are normal. She exhibits no distension. There is tenderness in the left lower quadrant. There is CVA tenderness. There is no rebound and no guarding.   Musculoskeletal: Normal range of motion. She exhibits no edema, tenderness or deformity.   Lymphadenopathy:     She has no cervical adenopathy.   Neurological: She is alert and oriented to person, place, and time. She has normal strength. No cranial nerve deficit or sensory deficit. GCS eye subscore is 4. GCS verbal subscore is 5. GCS motor subscore is 6.   Skin: Skin is warm, dry and intact. No rash noted. She is not diaphoretic. No erythema. No pallor.   Psychiatric: She has a normal mood and affect. Her speech is normal and behavior is normal. Judgment and thought content normal. Cognition and memory are normal.   Nursing note and vitals reviewed.      Procedures           ED Course  ED Course as of Jun 16 0318   Tue Jun 16, 2020 0318 IMPRESSION:  1. Allowing for technical artifact, no definite acute findings in the abdomen or pelvis.  2. Nonobstructing left kidney stone. No definite ureteral stones are seen, and there is no hydronephrosis.  3. Stable hepatosplenomegaly with hepatic steatosis.  4. Stable right ovarian lesion.  5. Grossly normal GI tract, including the appendix.   CT Abdomen Pelvis Without Contrast [ES]      ED Course  User Index  [ES] Martir Razo MD                                           MDM  Number of Diagnoses or Management Options  Acute cystitis with hematuria: new and requires workup  Acute left flank pain: new and requires workup  Dysuria: new and requires workup  Renal colic: new and requires workup     Amount and/or Complexity of Data Reviewed  Clinical lab tests: reviewed and ordered  Tests in the radiology section of CPT®: reviewed and ordered  Tests in the medicine section of CPT®: ordered and reviewed  Review and summarize past medical records: yes  Independent visualization of images, tracings, or specimens: yes    Risk of Complications, Morbidity, and/or Mortality  Presenting problems: moderate  Diagnostic procedures: moderate  Management options: moderate    Patient Progress  Patient progress: stable      Final diagnoses:   Acute left flank pain   Dysuria   Acute cystitis with hematuria   Renal colic            Martir Razo MD  06/16/20 8951

## 2020-06-20 LAB — BACTERIA SPEC AEROBE CULT: NORMAL

## 2020-07-05 ENCOUNTER — HOSPITAL ENCOUNTER (EMERGENCY)
Facility: HOSPITAL | Age: 35
Discharge: HOME | End: 2020-07-05
Attending: FAMILY MEDICINE | Admitting: FAMILY MEDICINE

## 2020-07-05 VITALS
OXYGEN SATURATION: 96 % | HEART RATE: 95 BPM | TEMPERATURE: 99.2 F | BODY MASS INDEX: 50.02 KG/M2 | RESPIRATION RATE: 20 BRPM | HEIGHT: 64 IN | DIASTOLIC BLOOD PRESSURE: 93 MMHG | SYSTOLIC BLOOD PRESSURE: 154 MMHG | WEIGHT: 293 LBS

## 2020-07-05 DIAGNOSIS — L02.219 ABSCESS OF PUBIC REGION: Primary | ICD-10-CM

## 2020-07-05 LAB
ALBUMIN SERPL-MCNC: 4.36 G/DL (ref 3.5–5.2)
ALBUMIN/GLOB SERPL: 1.1 G/DL
ALP SERPL-CCNC: 108 U/L (ref 39–117)
ALT SERPL W P-5'-P-CCNC: 46 U/L (ref 1–33)
ANION GAP SERPL CALCULATED.3IONS-SCNC: 18.7 MMOL/L (ref 5–15)
AST SERPL-CCNC: 55 U/L (ref 1–32)
BASOPHILS # BLD MANUAL: 0.05 10*3/MM3 (ref 0–0.2)
BASOPHILS NFR BLD AUTO: 1 % (ref 0–1.5)
BILIRUB SERPL-MCNC: 0.3 MG/DL (ref 0.2–1.2)
BUN SERPL-MCNC: 9 MG/DL (ref 6–20)
BUN/CREAT SERPL: 17 (ref 7–25)
CALCIUM SPEC-SCNC: 10.2 MG/DL (ref 8.6–10.5)
CHLORIDE SERPL-SCNC: 95 MMOL/L (ref 98–107)
CO2 SERPL-SCNC: 21.3 MMOL/L (ref 22–29)
CREAT SERPL-MCNC: 0.53 MG/DL (ref 0.57–1)
CRP SERPL-MCNC: 3.1 MG/DL (ref 0–0.5)
D-LACTATE SERPL-SCNC: 3.8 MMOL/L (ref 0.5–2)
DEPRECATED RDW RBC AUTO: 44.6 FL (ref 37–54)
ERYTHROCYTE [DISTWIDTH] IN BLOOD BY AUTOMATED COUNT: 13.8 % (ref 12.3–15.4)
ERYTHROCYTE [SEDIMENTATION RATE] IN BLOOD: 57 MM/HR (ref 0–20)
GFR SERPL CREATININE-BSD FRML MDRD: 132 ML/MIN/1.73
GLOBULIN UR ELPH-MCNC: 3.9 GM/DL
GLUCOSE SERPL-MCNC: 377 MG/DL (ref 65–99)
HCT VFR BLD AUTO: 37.8 % (ref 34–46.6)
HGB BLD-MCNC: 12.5 G/DL (ref 12–15.9)
INR PPP: 1.07 (ref 0.9–1.1)
LACTATE HOLD SPECIMEN: NORMAL
LYMPHOCYTES # BLD MANUAL: 1.29 10*3/MM3 (ref 0.7–3.1)
LYMPHOCYTES NFR BLD MANUAL: 25 % (ref 19.6–45.3)
LYMPHOCYTES NFR BLD MANUAL: 8 % (ref 5–12)
MCH RBC QN AUTO: 29.6 PG (ref 26.6–33)
MCHC RBC AUTO-ENTMCNC: 33.1 G/DL (ref 31.5–35.7)
MCV RBC AUTO: 89.6 FL (ref 79–97)
MONOCYTES # BLD AUTO: 0.41 10*3/MM3 (ref 0.1–0.9)
NEUTROPHILS # BLD AUTO: 3.41 10*3/MM3 (ref 1.7–7)
NEUTROPHILS NFR BLD MANUAL: 66 % (ref 42.7–76)
PLATELET # BLD AUTO: 315 10*3/MM3 (ref 140–450)
PMV BLD AUTO: 9.6 FL (ref 6–12)
POTASSIUM SERPL-SCNC: 4.4 MMOL/L (ref 3.5–5.2)
PROT SERPL-MCNC: 8.3 G/DL (ref 6–8.5)
PROTHROMBIN TIME: 13.7 SECONDS (ref 11.9–14.1)
RBC # BLD AUTO: 4.22 10*6/MM3 (ref 3.77–5.28)
RBC MORPH BLD: NORMAL
SCAN SLIDE: NORMAL
SMALL PLATELETS BLD QL SMEAR: ADEQUATE
SODIUM SERPL-SCNC: 135 MMOL/L (ref 136–145)
WBC # BLD AUTO: 5.16 10*3/MM3 (ref 3.4–10.8)

## 2020-07-05 PROCEDURE — 85610 PROTHROMBIN TIME: CPT | Performed by: PHYSICIAN ASSISTANT

## 2020-07-05 PROCEDURE — 87150 DNA/RNA AMPLIFIED PROBE: CPT | Performed by: PHYSICIAN ASSISTANT

## 2020-07-05 PROCEDURE — 96366 THER/PROPH/DIAG IV INF ADDON: CPT

## 2020-07-05 PROCEDURE — 96365 THER/PROPH/DIAG IV INF INIT: CPT

## 2020-07-05 PROCEDURE — 99284 EMERGENCY DEPT VISIT MOD MDM: CPT

## 2020-07-05 PROCEDURE — 85025 COMPLETE CBC W/AUTO DIFF WBC: CPT | Performed by: PHYSICIAN ASSISTANT

## 2020-07-05 PROCEDURE — 87147 CULTURE TYPE IMMUNOLOGIC: CPT | Performed by: PHYSICIAN ASSISTANT

## 2020-07-05 PROCEDURE — 87040 BLOOD CULTURE FOR BACTERIA: CPT | Performed by: PHYSICIAN ASSISTANT

## 2020-07-05 PROCEDURE — 83605 ASSAY OF LACTIC ACID: CPT | Performed by: PHYSICIAN ASSISTANT

## 2020-07-05 PROCEDURE — 86140 C-REACTIVE PROTEIN: CPT | Performed by: PHYSICIAN ASSISTANT

## 2020-07-05 PROCEDURE — 80053 COMPREHEN METABOLIC PANEL: CPT | Performed by: PHYSICIAN ASSISTANT

## 2020-07-05 PROCEDURE — 85007 BL SMEAR W/DIFF WBC COUNT: CPT | Performed by: PHYSICIAN ASSISTANT

## 2020-07-05 PROCEDURE — 85652 RBC SED RATE AUTOMATED: CPT | Performed by: PHYSICIAN ASSISTANT

## 2020-07-05 RX ORDER — SULFAMETHOXAZOLE AND TRIMETHOPRIM 800; 160 MG/1; MG/1
1 TABLET ORAL 2 TIMES DAILY
Qty: 10 TABLET | Refills: 0 | Status: SHIPPED | OUTPATIENT
Start: 2020-07-05 | End: 2020-07-10

## 2020-07-05 RX ORDER — SODIUM CHLORIDE 0.9 % (FLUSH) 0.9 %
10 SYRINGE (ML) INJECTION AS NEEDED
Status: DISCONTINUED | OUTPATIENT
Start: 2020-07-05 | End: 2020-07-05 | Stop reason: HOSPADM

## 2020-07-05 RX ORDER — HYDROCODONE BITARTRATE AND ACETAMINOPHEN 5; 325 MG/1; MG/1
1 TABLET ORAL ONCE
Status: COMPLETED | OUTPATIENT
Start: 2020-07-05 | End: 2020-07-05

## 2020-07-05 RX ADMIN — SODIUM CHLORIDE 1000 ML: 9 INJECTION, SOLUTION INTRAVENOUS at 04:54

## 2020-07-05 RX ADMIN — HYDROCODONE BITARTRATE AND ACETAMINOPHEN 1 TABLET: 5; 325 TABLET ORAL at 04:51

## 2020-07-05 RX ADMIN — SODIUM CHLORIDE 1000 ML: 9 INJECTION, SOLUTION INTRAVENOUS at 06:16

## 2020-07-05 RX ADMIN — VANCOMYCIN HYDROCHLORIDE 2500 MG: 1 INJECTION, POWDER, LYOPHILIZED, FOR SOLUTION INTRAVENOUS at 05:04

## 2020-07-05 NOTE — DISCHARGE INSTRUCTIONS
Please complete your antibiotic and follow up with your PCP in 2 days or return to ER if symptoms worsen.

## 2020-07-05 NOTE — ED PROVIDER NOTES
Subjective     Abscess   Abscess location: Left Mons Pubis.  Size:  3 cm   Abscess quality: draining, painful, redness and warmth    Red streaking: no    Duration:  1 week  Progression:  Worsening  Pain details:     Quality:  Aching and pressure    Severity:  Moderate    Duration:  1 week    Timing:  Constant    Progression:  Worsening  Chronicity:  New  Context: diabetes and skin injury    Context comment:  Uncontrolled DM on insulin  Relieved by:  None tried  Worsened by:  Nothing  Ineffective treatments:  None tried  Associated symptoms: fever    Associated symptoms: no fatigue, no nausea and no vomiting    Associated symptoms comment:  100.2    Risk factors: prior abscess        Review of Systems   Constitutional: Positive for fever. Negative for activity change, appetite change, chills, diaphoresis, fatigue and unexpected weight change.   Gastrointestinal: Negative for nausea and vomiting.   Skin: Positive for color change. Negative for pallor, rash and wound.       Past Medical History:   Diagnosis Date   • Abnormal ECG    • Anemia    • Anxiety    • Asthma    • Depression    • Diabetes mellitus (CMS/HCC)    • DVT (deep venous thrombosis) (CMS/HCC)    • Factor 5 Leiden mutation, heterozygous (CMS/HCC)    • Fibroid    • GERD (gastroesophageal reflux disease)    • Gestational diabetes    • Gout    • Hyperlipidemia    • Hypothyroid    • Kidney stone    • Neuropathy    • Ovarian cyst    • PE (pulmonary embolism)    • Polycystic ovary syndrome    • Preeclampsia    • Rh incompatibility    • Urinary tract infection    • Varicella        Allergies   Allergen Reactions   • Amoxicillin Hives   • Haldol [Haloperidol] Hives   • Penicillins Hives   • Toradol [Ketorolac Tromethamine] Hives   • Tramadol Swelling       Past Surgical History:   Procedure Laterality Date   • CARDIAC SURGERY      Heart Cath done in    •  SECTION     • CHOLECYSTECTOMY     • COLONOSCOPY         Family History   Problem Relation Age of  "Onset   • No Known Problems Mother    • No Known Problems Father    • No Known Problems Sister    • No Known Problems Brother    • No Known Problems Son    • No Known Problems Daughter    • No Known Problems Maternal Grandmother    • No Known Problems Maternal Grandfather    • No Known Problems Paternal Grandmother    • No Known Problems Paternal Grandfather    • No Known Problems Cousin    • Rheum arthritis Neg Hx    • Osteoarthritis Neg Hx    • Asthma Neg Hx    • Diabetes Neg Hx    • Heart failure Neg Hx    • Hyperlipidemia Neg Hx    • Hypertension Neg Hx    • Migraines Neg Hx    • Rashes / Skin problems Neg Hx    • Seizures Neg Hx    • Stroke Neg Hx    • Thyroid disease Neg Hx        Social History     Socioeconomic History   • Marital status:      Spouse name: Not on file   • Number of children: Not on file   • Years of education: Not on file   • Highest education level: Not on file   Tobacco Use   • Smoking status: Never Smoker   • Smokeless tobacco: Never Used   Substance and Sexual Activity   • Alcohol use: No   • Drug use: Yes     Comment: Pt has track marks on right arm. Pt states \"It's from my INR being drawn.\"    • Sexual activity: Defer           Objective   Physical Exam   Constitutional: She is oriented to person, place, and time. She appears well-developed and well-nourished. No distress.   HENT:   Head: Normocephalic and atraumatic.   Right Ear: External ear normal.   Left Ear: External ear normal.   Nose: Nose normal.   Eyes: Pupils are equal, round, and reactive to light. Conjunctivae and EOM are normal.   Neck: Normal range of motion. Neck supple. No JVD present. No tracheal deviation present.   Cardiovascular: Normal rate, regular rhythm and normal heart sounds.   No murmur heard.  Pulmonary/Chest: Effort normal and breath sounds normal. No respiratory distress. She has no wheezes.   Abdominal: Soft. Bowel sounds are normal. There is no tenderness.   Musculoskeletal: Normal range of " motion. She exhibits no edema, tenderness or deformity.   Neurological: She is alert and oriented to person, place, and time. No cranial nerve deficit.   Skin: Skin is warm and dry. No rash noted. She is not diaphoretic. No erythema. No pallor.   Left Mons pubis 3 cm abscess that is opened with erythema, edema and pus drainage.    Psychiatric: She has a normal mood and affect. Her behavior is normal. Thought content normal.   Nursing note and vitals reviewed.      Procedures           ED Course  ED Course as of Jul 05 0556   Sun Jul 05, 2020   0554 Patient diagnosed with left pubic abscess. Will be d/c home with rx for bactrim and f/u with PCP in 2 days or return to ER if symptoms worsen.     [MM]      ED Course User Index  [MM] Nuvia Cee PA                                           [facility]  Number of Diagnoses or Management Options  Abscess of pubic region:      Amount and/or Complexity of Data Reviewed  Clinical lab tests: ordered and reviewed        Final diagnoses:   Abscess of pubic region            Nuvia Cee PA  07/05/20 1329

## 2020-07-07 LAB
BACTERIA BLD CULT: ABNORMAL
BACTERIA SPEC AEROBE CULT: ABNORMAL
GRAM STN SPEC: ABNORMAL
ISOLATED FROM: ABNORMAL

## 2020-07-08 ENCOUNTER — HOSPITAL ENCOUNTER (EMERGENCY)
Age: 35
Discharge: HOME | End: 2020-07-08
Payer: COMMERCIAL

## 2020-07-08 VITALS
DIASTOLIC BLOOD PRESSURE: 130 MMHG | SYSTOLIC BLOOD PRESSURE: 167 MMHG | RESPIRATION RATE: 17 BRPM | HEART RATE: 103 BPM | OXYGEN SATURATION: 96 %

## 2020-07-08 VITALS
HEART RATE: 100 BPM | DIASTOLIC BLOOD PRESSURE: 107 MMHG | SYSTOLIC BLOOD PRESSURE: 168 MMHG | RESPIRATION RATE: 17 BRPM | OXYGEN SATURATION: 95 %

## 2020-07-08 VITALS
RESPIRATION RATE: 18 BRPM | DIASTOLIC BLOOD PRESSURE: 109 MMHG | SYSTOLIC BLOOD PRESSURE: 190 MMHG | OXYGEN SATURATION: 96 % | TEMPERATURE: 97.7 F | HEART RATE: 105 BPM

## 2020-07-08 VITALS
OXYGEN SATURATION: 96 % | RESPIRATION RATE: 17 BRPM | HEART RATE: 102 BPM | DIASTOLIC BLOOD PRESSURE: 131 MMHG | SYSTOLIC BLOOD PRESSURE: 188 MMHG

## 2020-07-08 VITALS
OXYGEN SATURATION: 94 % | HEART RATE: 107 BPM | RESPIRATION RATE: 17 BRPM | TEMPERATURE: 97.7 F | DIASTOLIC BLOOD PRESSURE: 113 MMHG | SYSTOLIC BLOOD PRESSURE: 182 MMHG

## 2020-07-08 VITALS — BODY MASS INDEX: 54.9 KG/M2

## 2020-07-08 DIAGNOSIS — E11.9: ICD-10-CM

## 2020-07-08 DIAGNOSIS — Z88.6: ICD-10-CM

## 2020-07-08 DIAGNOSIS — Z79.899: ICD-10-CM

## 2020-07-08 DIAGNOSIS — N30.00: Primary | ICD-10-CM

## 2020-07-08 DIAGNOSIS — Z87.442: ICD-10-CM

## 2020-07-08 DIAGNOSIS — Z88.0: ICD-10-CM

## 2020-07-08 DIAGNOSIS — E78.5: ICD-10-CM

## 2020-07-08 DIAGNOSIS — I10: ICD-10-CM

## 2020-07-08 DIAGNOSIS — K21.9: ICD-10-CM

## 2020-07-08 LAB
ALBUMIN LEVEL: 4.8 G/DL (ref 3.5–5)
ALBUMIN/GLOB SERPL: 1.4 {RATIO} (ref 1.1–1.8)
ALP ISO SERPL-ACNC: 122 U/L (ref 38–126)
ALT SERPLBLD-CCNC: 63 U/L (ref 12–78)
AMYLASE SERPL-CCNC: 36 U/L (ref 30–110)
ANION GAP SERPL CALC-SCNC: 19.9 MEQ/L (ref 5–15)
AST SERPL QL: 83 U/L (ref 14–36)
BACTERIA URNS QL MICRO: (no result) /LPF
BILIRUBIN,TOTAL: 0.5 MG/DL (ref 0.2–1.3)
BUN SERPL-MCNC: 13 MG/DL (ref 7–17)
CALCIUM SPEC-MCNC: 10.1 MG/DL (ref 8.4–10.2)
CHLORIDE SPEC-SCNC: 96 MMOL/L (ref 98–107)
CO2 SERPL-SCNC: 23 MMOL/L (ref 22–30)
COLOR UR: (no result)
CREAT BLD-SCNC: 0.4 MG/DL (ref 0.52–1.04)
CREATININE CLEARANCE ESTIMATED: 171 ML/MIN (ref 50–200)
ESTIMATED GLOMERULAR FILT RATE: 183 ML/MIN (ref 60–?)
GFR (AFRICAN AMERICAN): 221 ML/MIN (ref 60–?)
GLOBULIN SER CALC-MCNC: 3.5 G/DL (ref 1.3–3.2)
GLUCOSE: 354 MG/DL (ref 74–100)
HCT VFR BLD CALC: 36.2 % (ref 37–47)
HGB BLD-MCNC: 12.4 G/DL (ref 12.2–16.2)
INR PPP: 1.18 (ref 0.9–1.1)
LIPASE: 178 U/L (ref 23–300)
MCHC RBC-ENTMCNC: 34.3 G/DL (ref 31.8–35.4)
MCV RBC: 89.8 FL (ref 81–99)
MEAN CORPUSCULAR HEMOGLOBIN: 30.8 PG (ref 27–31.2)
MICRO URNS: (no result)
PH UR: 8.5 [PH] (ref 5–8.5)
PLATELET # BLD: 300 K/MM3 (ref 142–424)
POTASSIUM: 3.9 MMOL/L (ref 3.5–5.1)
PROT SERPL-MCNC: 8.3 G/DL (ref 6.3–8.2)
PROT UR STRIP-MCNC: (no result) MG/DL
PT BLD: 12 SECONDS (ref 9.4–11.8)
RBC # BLD AUTO: 4.03 M/MM3 (ref 4.2–5.4)
RBC #/AREA URNS HPF: (no result) #/HPF (ref 0–3)
SODIUM SPEC-SCNC: 135 MMOL/L (ref 136–145)
SP GR UR: 1.01 (ref 1–1.03)
SQUAMOUS URNS QL MICRO: (no result) #/HPF (ref 0–5)
UROBILINOGEN UR QL: 1 EU/DL
WBC # BLD AUTO: 5.2 K/MM3 (ref 4.8–10.8)

## 2020-07-08 PROCEDURE — 85610 PROTHROMBIN TIME: CPT

## 2020-07-08 PROCEDURE — 99283 EMERGENCY DEPT VISIT LOW MDM: CPT

## 2020-07-08 PROCEDURE — 96375 TX/PRO/DX INJ NEW DRUG ADDON: CPT

## 2020-07-08 PROCEDURE — 74176 CT ABD & PELVIS W/O CONTRAST: CPT

## 2020-07-08 PROCEDURE — 83690 ASSAY OF LIPASE: CPT

## 2020-07-08 PROCEDURE — 82150 ASSAY OF AMYLASE: CPT

## 2020-07-08 PROCEDURE — 81025 URINE PREGNANCY TEST: CPT

## 2020-07-08 PROCEDURE — 80053 COMPREHEN METABOLIC PANEL: CPT

## 2020-07-08 PROCEDURE — 96365 THER/PROPH/DIAG IV INF INIT: CPT

## 2020-07-08 PROCEDURE — 85025 COMPLETE CBC W/AUTO DIFF WBC: CPT

## 2020-07-08 PROCEDURE — 81001 URINALYSIS AUTO W/SCOPE: CPT

## 2020-07-10 LAB — BACTERIA SPEC AEROBE CULT: NORMAL

## 2020-10-15 ENCOUNTER — APPOINTMENT (OUTPATIENT)
Dept: GENERAL RADIOLOGY | Facility: HOSPITAL | Age: 35
End: 2020-10-15

## 2020-10-15 ENCOUNTER — HOSPITAL ENCOUNTER (EMERGENCY)
Facility: HOSPITAL | Age: 35
Discharge: HOME | End: 2020-10-15
Attending: FAMILY MEDICINE | Admitting: FAMILY MEDICINE

## 2020-10-15 VITALS
TEMPERATURE: 98.5 F | DIASTOLIC BLOOD PRESSURE: 80 MMHG | HEIGHT: 64 IN | BODY MASS INDEX: 50.02 KG/M2 | HEART RATE: 84 BPM | RESPIRATION RATE: 20 BRPM | WEIGHT: 293 LBS | SYSTOLIC BLOOD PRESSURE: 145 MMHG | OXYGEN SATURATION: 96 %

## 2020-10-15 DIAGNOSIS — S42.291A HUMERAL HEAD FRACTURE, RIGHT, CLOSED, INITIAL ENCOUNTER: Primary | ICD-10-CM
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
## 2020-10-15 PROCEDURE — 73030 X-RAY EXAM OF SHOULDER: CPT

## 2020-10-15 PROCEDURE — 25010000002 MORPHINE PER 10 MG: Performed by: FAMILY MEDICINE

## 2020-10-15 PROCEDURE — 73030 X-RAY EXAM OF SHOULDER: CPT | Performed by: RADIOLOGY

## 2020-10-15 PROCEDURE — 99284 EMERGENCY DEPT VISIT MOD MDM: CPT

## 2020-10-15 PROCEDURE — 96376 TX/PRO/DX INJ SAME DRUG ADON: CPT

## 2020-10-15 PROCEDURE — 73060 X-RAY EXAM OF HUMERUS: CPT

## 2020-10-15 PROCEDURE — 73060 X-RAY EXAM OF HUMERUS: CPT | Performed by: RADIOLOGY

## 2020-10-15 PROCEDURE — 96374 THER/PROPH/DIAG INJ IV PUSH: CPT

## 2020-10-15 RX ORDER — HYDROCODONE BITARTRATE AND ACETAMINOPHEN 5; 325 MG/1; MG/1
1 TABLET ORAL EVERY 6 HOURS PRN
Qty: 12 TABLET | Refills: 0 | Status: SHIPPED | OUTPATIENT
Start: 2020-10-15 | End: 2021-01-20

## 2020-10-15 RX ADMIN — MORPHINE SULFATE 4 MG: 4 INJECTION, SOLUTION INTRAMUSCULAR; INTRAVENOUS at 11:46

## 2020-10-15 RX ADMIN — MORPHINE SULFATE 4 MG: 4 INJECTION, SOLUTION INTRAMUSCULAR; INTRAVENOUS at 13:11

## 2020-10-15 NOTE — ED PROVIDER NOTES
Subjective   This is a 35-year-old female that presents to the emergency department by EMS with chief complaint of fall just prior to arrival.  Patient states she lost her balance and slipped on a wet shower.  Patient states she fell on her right shoulder.  Patient complains of severe right shoulder pain.  States she is unable to move her right arm.  Patient does have significant history of DVT, diabetes, hyperlipidemia.      History provided by:  Patient   used: No    Fall  Mechanism of injury: fall    Injury location:  Shoulder/arm  Shoulder/arm injury location:  R shoulder and R arm  Incident location:  Home  Time since incident:  1 hour  Arrived directly from scene: no    Fall:     Fall occurred:  Tripped    Impact surface:  Hard floor    Entrapped after fall: no    Protective equipment: none    Suspicion of alcohol use: no    Suspicion of drug use: no    Tetanus status:  Unknown  Prior to arrival data:     Bystander interventions:  None    Patient ambulatory at scene: no      Blood loss:  None    Orientation at scene:  Place, situation, time and person    Loss of consciousness: no      Amnesic to event: no      Airway interventions:  None    Breathing interventions:  None    IV access status:  None    IO access:  None    Fluids administered:  None    Medications administered:  None    Immobilization:  None  Associated symptoms: no abdominal pain, no back pain, no chest pain, no headaches, no hearing loss, no loss of consciousness, no neck pain and no seizures    Risk factors: no asthma, no CABG, no COPD, no diabetes, no dialysis and no past MI        Review of Systems   HENT: Negative for hearing loss.    Eyes: Negative.    Respiratory: Negative.  Negative for apnea, choking and chest tightness.    Cardiovascular: Negative.  Negative for chest pain and leg swelling.   Gastrointestinal: Negative.  Negative for abdominal pain.   Genitourinary: Negative.  Negative for difficulty urinating,  dyspareunia, enuresis, flank pain, frequency, genital sores and hematuria.   Musculoskeletal: Positive for arthralgias, joint swelling and myalgias. Negative for back pain and neck pain.   Skin: Negative.  Negative for rash.   Neurological: Negative.  Negative for seizures, loss of consciousness and headaches.   Hematological: Negative.  Negative for adenopathy. Does not bruise/bleed easily.   Psychiatric/Behavioral: Negative.    All other systems reviewed and are negative.      Past Medical History:   Diagnosis Date   • Abnormal ECG    • Anemia    • Anxiety    • Asthma    • Depression    • Diabetes mellitus (CMS/HCC)    • DVT (deep venous thrombosis) (CMS/[facility])    • Factor 5 Leiden mutation, heterozygous (CMS/HCC)    • Fibroid    • GERD (gastroesophageal reflux disease)    • Gestational diabetes    • Gout    • Hyperlipidemia    • Hypothyroid    • Kidney stone    • Neuropathy    • Ovarian cyst    • PE (pulmonary embolism)    • Polycystic ovary syndrome    • Preeclampsia    • Rh incompatibility    • Urinary tract infection    • Varicella        Allergies   Allergen Reactions   • Amoxicillin Hives   • Haldol [Haloperidol] Hives   • Penicillins Hives   • Toradol [Ketorolac Tromethamine] Hives   • Tramadol Swelling       Past Surgical History:   Procedure Laterality Date   • CARDIAC SURGERY      Heart Cath done in    •  SECTION     • CHOLECYSTECTOMY     • COLONOSCOPY         Family History   Problem Relation Age of Onset   • No Known Problems Mother    • No Known Problems Father    • No Known Problems Sister    • No Known Problems Brother    • No Known Problems Son    • No Known Problems Daughter    • No Known Problems Maternal Grandmother    • No Known Problems Maternal Grandfather    • No Known Problems Paternal Grandmother    • No Known Problems Paternal Grandfather    • No Known Problems Cousin    • Rheum arthritis Neg Hx    • Osteoarthritis Neg Hx    • Asthma Neg Hx    • Diabetes Neg Hx    • Heart  "failure Neg Hx    • Hyperlipidemia Neg Hx    • Hypertension Neg Hx    • Migraines Neg Hx    • Rashes / Skin problems Neg Hx    • Seizures Neg Hx    • Stroke Neg Hx    • Thyroid disease Neg Hx        Social History     Socioeconomic History   • Marital status:      Spouse name: Not on file   • Number of children: Not on file   • Years of education: Not on file   • Highest education level: Not on file   Tobacco Use   • Smoking status: Never Smoker   • Smokeless tobacco: Never Used   Substance and Sexual Activity   • Alcohol use: No   • Drug use: Yes     Comment: Pt has track marks on right arm. Pt states \"It's from my INR being drawn.\"    • Sexual activity: Defer           Objective   Physical Exam  Vitals signs and nursing note reviewed.   Constitutional:       General: She is not in acute distress.     Appearance: Normal appearance. She is not ill-appearing, toxic-appearing or diaphoretic.   HENT:      Head: Normocephalic and atraumatic.      Right Ear: Tympanic membrane, ear canal and external ear normal. There is no impacted cerumen.      Left Ear: Tympanic membrane, ear canal and external ear normal. There is no impacted cerumen.      Nose: Nose normal. No congestion or rhinorrhea.      Mouth/Throat:      Mouth: Mucous membranes are dry.      Pharynx: Oropharynx is clear. No oropharyngeal exudate or posterior oropharyngeal erythema.   Eyes:      General: No scleral icterus.        Right eye: No discharge.         Left eye: No discharge.      Extraocular Movements: Extraocular movements intact.      Conjunctiva/sclera: Conjunctivae normal.      Pupils: Pupils are equal, round, and reactive to light.   Neck:      Musculoskeletal: Normal range of motion and neck supple. No neck rigidity or muscular tenderness.      Vascular: No carotid bruit.   Cardiovascular:      Rate and Rhythm: Normal rate.      Pulses: Normal pulses.      Heart sounds: Normal heart sounds. No murmur. No friction rub. No gallop.  "   Pulmonary:      Effort: Pulmonary effort is normal. No respiratory distress.      Breath sounds: No stridor. No wheezing, rhonchi or rales.   Chest:      Chest wall: No tenderness.   Abdominal:      General: Abdomen is flat. There is no distension.      Palpations: There is no mass.      Tenderness: There is no abdominal tenderness. There is no right CVA tenderness, left CVA tenderness, guarding or rebound.      Hernia: No hernia is present.   Musculoskeletal:         General: Swelling and tenderness present. No deformity or signs of injury.      Right shoulder: She exhibits decreased range of motion, tenderness, pain and spasm.      Right lower leg: No edema.      Left lower leg: No edema.   Lymphadenopathy:      Cervical: No cervical adenopathy.   Skin:     General: Skin is warm.      Capillary Refill: Capillary refill takes less than 2 seconds.      Coloration: Skin is not jaundiced or pale.      Findings: No bruising, erythema, lesion or rash.   Neurological:      General: No focal deficit present.      Mental Status: She is alert and oriented to person, place, and time. Mental status is at baseline.      Cranial Nerves: No cranial nerve deficit.      Sensory: No sensory deficit.      Motor: No weakness.      Coordination: Coordination normal.      Gait: Gait normal.      Deep Tendon Reflexes: Reflexes normal.   Psychiatric:         Mood and Affect: Mood normal.         Behavior: Behavior normal.         Thought Content: Thought content normal.         Judgment: Judgment normal.         Splint - Cast - Strapping    Date/Time: 10/15/2020 12:28 PM  Performed by: Dayron Loco PA-C  Authorized by: Kristyn Davis DO     Consent:     Consent obtained:  Verbal    Consent given by:  Patient    Risks discussed:  Discoloration    Alternatives discussed:  No treatment  Pre-procedure details:     Sensation:  Normal    Skin color:  Rioght   Procedure details:     Laterality:  Right    Location:   Shoulder    Shoulder:  R shoulder    Strapping: no      Cast type:  Short arm    Supplies:  Prefabricated splint  Post-procedure details:     Pain:  Improved    Sensation:  Normal    Skin color:  Pink     Patient tolerance of procedure:  Tolerated well, no immediate complications  Comments:      N/V intact                ED Course  ED Course as of Oct 15 1252   Thu Oct 15, 2020   1224 IMPRESSION:  Nondisplaced humeral head tuberosity fracture.    []   1224 IMPRESSION:  Nondisplaced greater tuberosity fracture of the humeral head. No  dislocation or humeral neck fracture identified.    []   1224 Discussed with Dr. Escalante, states to follow-up at office.     []      ED Course User Index  [] Dayron Loco PA-C                                           [facility]    Final diagnoses:   Humeral head fracture, right, closed, initial encounter   Fall, initial encounter            Dayron Loco PA-C  10/15/20 1259

## 2020-10-15 NOTE — ED NOTES
[facility]. They have got her on the list.        Karla Sánchez  10/15/20 2662

## 2020-10-15 NOTE — ED NOTES
Pt attempting to get ahold of a family member to transport her home.     Monie Rashid, RN  10/15/20 7307

## 2021-01-10 ENCOUNTER — APPOINTMENT (OUTPATIENT)
Dept: CT IMAGING | Facility: HOSPITAL | Age: 36
End: 2021-01-10

## 2021-01-10 ENCOUNTER — HOSPITAL ENCOUNTER (EMERGENCY)
Facility: HOSPITAL | Age: 36
Discharge: HOME | End: 2021-01-10
Attending: STUDENT IN AN ORGANIZED HEALTH CARE EDUCATION/TRAINING PROGRAM | Admitting: STUDENT IN AN ORGANIZED HEALTH CARE EDUCATION/TRAINING PROGRAM

## 2021-01-10 ENCOUNTER — APPOINTMENT (OUTPATIENT)
Dept: GENERAL RADIOLOGY | Facility: HOSPITAL | Age: 36
End: 2021-01-10

## 2021-01-10 VITALS
HEART RATE: 102 BPM | OXYGEN SATURATION: 93 % | DIASTOLIC BLOOD PRESSURE: 107 MMHG | BODY MASS INDEX: 50.02 KG/M2 | TEMPERATURE: 98 F | SYSTOLIC BLOOD PRESSURE: 174 MMHG | WEIGHT: 293 LBS | RESPIRATION RATE: 20 BRPM | HEIGHT: 64 IN

## 2021-01-10 DIAGNOSIS — N20.0 NEPHROLITHIASIS: Primary | ICD-10-CM

## 2021-01-10 LAB
6-ACETYL MORPHINE: NEGATIVE
ALBUMIN SERPL-MCNC: 4.47 G/DL (ref 3.5–5.2)
ALBUMIN/GLOB SERPL: 1.3 G/DL
ALP SERPL-CCNC: 85 U/L (ref 39–117)
ALT SERPL W P-5'-P-CCNC: 35 U/L (ref 1–33)
AMPHET+METHAMPHET UR QL: NEGATIVE
ANION GAP SERPL CALCULATED.3IONS-SCNC: 17.2 MMOL/L (ref 5–15)
AST SERPL-CCNC: 32 U/L (ref 1–32)
ATMOSPHERIC PRESS: 734 MMHG
BACTERIA UR QL AUTO: ABNORMAL /HPF
BARBITURATES UR QL SCN: NEGATIVE
BASE EXCESS BLDV CALC-SCNC: -6.2 MMOL/L (ref 0–2)
BASOPHILS # BLD AUTO: 0.03 10*3/MM3 (ref 0–0.2)
BASOPHILS NFR BLD AUTO: 0.4 % (ref 0–1.5)
BDY SITE: ABNORMAL
BENZODIAZ UR QL SCN: NEGATIVE
BILIRUB SERPL-MCNC: 0.4 MG/DL (ref 0–1.2)
BILIRUB UR QL STRIP: NEGATIVE
BODY TEMPERATURE: 37 C
BUN SERPL-MCNC: 12 MG/DL (ref 6–20)
BUN/CREAT SERPL: 16.4 (ref 7–25)
BUPRENORPHINE SERPL-MCNC: NEGATIVE NG/ML
CALCIUM SPEC-SCNC: 10.2 MG/DL (ref 8.6–10.5)
CANNABINOIDS SERPL QL: NEGATIVE
CHLORIDE SERPL-SCNC: 99 MMOL/L (ref 98–107)
CLARITY UR: CLEAR
CO2 BLDA-SCNC: 20.7 MMOL/L (ref 22–33)
CO2 SERPL-SCNC: 18.8 MMOL/L (ref 22–29)
COCAINE UR QL: NEGATIVE
COHGB MFR BLD: 1.4 % (ref 0–5)
COLOR UR: YELLOW
CREAT SERPL-MCNC: 0.73 MG/DL (ref 0.57–1)
CRP SERPL-MCNC: 2.22 MG/DL (ref 0–0.5)
D-LACTATE SERPL-SCNC: 3.2 MMOL/L (ref 0.5–2)
DEPRECATED RDW RBC AUTO: 42.8 FL (ref 37–54)
EOSINOPHIL # BLD AUTO: 0.03 10*3/MM3 (ref 0–0.4)
EOSINOPHIL NFR BLD AUTO: 0.4 % (ref 0.3–6.2)
ERYTHROCYTE [DISTWIDTH] IN BLOOD BY AUTOMATED COUNT: 13.8 % (ref 12.3–15.4)
ERYTHROCYTE [SEDIMENTATION RATE] IN BLOOD: 25 MM/HR (ref 0–20)
GFR SERPL CREATININE-BSD FRML MDRD: 91 ML/MIN/1.73
GLOBULIN UR ELPH-MCNC: 3.4 GM/DL
GLUCOSE SERPL-MCNC: 426 MG/DL (ref 65–99)
GLUCOSE UR STRIP-MCNC: ABNORMAL MG/DL
HCG SERPL QL: NEGATIVE
HCO3 BLDV-SCNC: 19.5 MMOL/L (ref 22–28)
HCT VFR BLD AUTO: 38.3 % (ref 34–46.6)
HGB BLD-MCNC: 13.1 G/DL (ref 12–15.9)
HGB BLDA-MCNC: 13 G/DL (ref 13.5–17.5)
HGB UR QL STRIP.AUTO: NEGATIVE
HYALINE CASTS UR QL AUTO: ABNORMAL /LPF
IMM GRANULOCYTES # BLD AUTO: 0.05 10*3/MM3 (ref 0–0.05)
IMM GRANULOCYTES NFR BLD AUTO: 0.6 % (ref 0–0.5)
INHALED O2 CONCENTRATION: 21 %
INR PPP: 0.99 (ref 0.9–1.1)
KETONES UR QL STRIP: ABNORMAL
LACTATE HOLD SPECIMEN: NORMAL
LEUKOCYTE ESTERASE UR QL STRIP.AUTO: NEGATIVE
LYMPHOCYTES # BLD AUTO: 0.8 10*3/MM3 (ref 0.7–3.1)
LYMPHOCYTES NFR BLD AUTO: 9.7 % (ref 19.6–45.3)
Lab: ABNORMAL
MAGNESIUM SERPL-MCNC: 1.3 MG/DL (ref 1.6–2.6)
MCH RBC QN AUTO: 29.9 PG (ref 26.6–33)
MCHC RBC AUTO-ENTMCNC: 34.2 G/DL (ref 31.5–35.7)
MCV RBC AUTO: 87.4 FL (ref 79–97)
METHADONE UR QL SCN: NEGATIVE
METHGB BLD QL: 0.2 % (ref 0–3)
MODALITY: ABNORMAL
MONOCYTES # BLD AUTO: 0.5 10*3/MM3 (ref 0.1–0.9)
MONOCYTES NFR BLD AUTO: 6 % (ref 5–12)
NEUTROPHILS NFR BLD AUTO: 6.88 10*3/MM3 (ref 1.7–7)
NEUTROPHILS NFR BLD AUTO: 82.9 % (ref 42.7–76)
NITRITE UR QL STRIP: NEGATIVE
NRBC BLD AUTO-RTO: 0 /100 WBC (ref 0–0.2)
OPIATES UR QL: POSITIVE
OXYCODONE UR QL SCN: NEGATIVE
OXYHGB MFR BLDV: 94.8 % (ref 45–75)
PCO2 BLDV: 38.6 MM HG (ref 41–51)
PCP UR QL SCN: NEGATIVE
PH BLDV: 7.31 PH UNITS (ref 7.32–7.42)
PH UR STRIP.AUTO: <=5 [PH] (ref 5–8)
PLATELET # BLD AUTO: 205 10*3/MM3 (ref 140–450)
PMV BLD AUTO: 9.3 FL (ref 6–12)
PO2 BLDV: 82.8 MM HG (ref 27–53)
POTASSIUM SERPL-SCNC: 3.7 MMOL/L (ref 3.5–5.2)
PROT SERPL-MCNC: 7.9 G/DL (ref 6–8.5)
PROT UR QL STRIP: ABNORMAL
PROTHROMBIN TIME: 12.9 SECONDS (ref 11.9–14.1)
RBC # BLD AUTO: 4.38 10*6/MM3 (ref 3.77–5.28)
RBC # UR: ABNORMAL /HPF
REF LAB TEST METHOD: ABNORMAL
SODIUM SERPL-SCNC: 135 MMOL/L (ref 136–145)
SP GR UR STRIP: >1.03 (ref 1–1.03)
SQUAMOUS #/AREA URNS HPF: ABNORMAL /HPF
UROBILINOGEN UR QL STRIP: ABNORMAL
VENTILATOR MODE: ABNORMAL
WBC # BLD AUTO: 8.29 10*3/MM3 (ref 3.4–10.8)
WBC UR QL AUTO: ABNORMAL /HPF

## 2021-01-10 PROCEDURE — 83735 ASSAY OF MAGNESIUM: CPT | Performed by: STUDENT IN AN ORGANIZED HEALTH CARE EDUCATION/TRAINING PROGRAM

## 2021-01-10 PROCEDURE — 80307 DRUG TEST PRSMV CHEM ANLYZR: CPT | Performed by: STUDENT IN AN ORGANIZED HEALTH CARE EDUCATION/TRAINING PROGRAM

## 2021-01-10 PROCEDURE — 99284 EMERGENCY DEPT VISIT MOD MDM: CPT

## 2021-01-10 PROCEDURE — 82820 HEMOGLOBIN-OXYGEN AFFINITY: CPT

## 2021-01-10 PROCEDURE — 87086 URINE CULTURE/COLONY COUNT: CPT | Performed by: STUDENT IN AN ORGANIZED HEALTH CARE EDUCATION/TRAINING PROGRAM

## 2021-01-10 PROCEDURE — 25010000002 ONDANSETRON PER 1 MG: Performed by: STUDENT IN AN ORGANIZED HEALTH CARE EDUCATION/TRAINING PROGRAM

## 2021-01-10 PROCEDURE — 81001 URINALYSIS AUTO W/SCOPE: CPT | Performed by: STUDENT IN AN ORGANIZED HEALTH CARE EDUCATION/TRAINING PROGRAM

## 2021-01-10 PROCEDURE — 85025 COMPLETE CBC W/AUTO DIFF WBC: CPT | Performed by: STUDENT IN AN ORGANIZED HEALTH CARE EDUCATION/TRAINING PROGRAM

## 2021-01-10 PROCEDURE — 36415 COLL VENOUS BLD VENIPUNCTURE: CPT

## 2021-01-10 PROCEDURE — 63710000001 ONDANSETRON ODT 4 MG TABLET DISPERSIBLE: Performed by: STUDENT IN AN ORGANIZED HEALTH CARE EDUCATION/TRAINING PROGRAM

## 2021-01-10 PROCEDURE — 86140 C-REACTIVE PROTEIN: CPT | Performed by: STUDENT IN AN ORGANIZED HEALTH CARE EDUCATION/TRAINING PROGRAM

## 2021-01-10 PROCEDURE — 82805 BLOOD GASES W/O2 SATURATION: CPT

## 2021-01-10 PROCEDURE — 84703 CHORIONIC GONADOTROPIN ASSAY: CPT | Performed by: STUDENT IN AN ORGANIZED HEALTH CARE EDUCATION/TRAINING PROGRAM

## 2021-01-10 PROCEDURE — 96375 TX/PRO/DX INJ NEW DRUG ADDON: CPT

## 2021-01-10 PROCEDURE — 25010000002 MORPHINE PER 10 MG: Performed by: STUDENT IN AN ORGANIZED HEALTH CARE EDUCATION/TRAINING PROGRAM

## 2021-01-10 PROCEDURE — 71045 X-RAY EXAM CHEST 1 VIEW: CPT

## 2021-01-10 PROCEDURE — 80053 COMPREHEN METABOLIC PANEL: CPT | Performed by: STUDENT IN AN ORGANIZED HEALTH CARE EDUCATION/TRAINING PROGRAM

## 2021-01-10 PROCEDURE — 85610 PROTHROMBIN TIME: CPT | Performed by: STUDENT IN AN ORGANIZED HEALTH CARE EDUCATION/TRAINING PROGRAM

## 2021-01-10 PROCEDURE — 96374 THER/PROPH/DIAG INJ IV PUSH: CPT

## 2021-01-10 PROCEDURE — 74176 CT ABD & PELVIS W/O CONTRAST: CPT

## 2021-01-10 PROCEDURE — 25010000002 HYDROMORPHONE 1 MG/ML SOLUTION: Performed by: STUDENT IN AN ORGANIZED HEALTH CARE EDUCATION/TRAINING PROGRAM

## 2021-01-10 PROCEDURE — 83605 ASSAY OF LACTIC ACID: CPT | Performed by: STUDENT IN AN ORGANIZED HEALTH CARE EDUCATION/TRAINING PROGRAM

## 2021-01-10 PROCEDURE — 85652 RBC SED RATE AUTOMATED: CPT | Performed by: STUDENT IN AN ORGANIZED HEALTH CARE EDUCATION/TRAINING PROGRAM

## 2021-01-10 PROCEDURE — 87040 BLOOD CULTURE FOR BACTERIA: CPT | Performed by: STUDENT IN AN ORGANIZED HEALTH CARE EDUCATION/TRAINING PROGRAM

## 2021-01-10 RX ORDER — ONDANSETRON 2 MG/ML
4 INJECTION INTRAMUSCULAR; INTRAVENOUS ONCE
Status: COMPLETED | OUTPATIENT
Start: 2021-01-10 | End: 2021-01-10

## 2021-01-10 RX ORDER — ONDANSETRON 4 MG/1
4 TABLET, ORALLY DISINTEGRATING ORAL EVERY 8 HOURS PRN
Qty: 21 TABLET | Refills: 0 | Status: SHIPPED | OUTPATIENT
Start: 2021-01-10 | End: 2021-02-28 | Stop reason: SDUPTHER

## 2021-01-10 RX ORDER — TAMSULOSIN HYDROCHLORIDE 0.4 MG/1
1 CAPSULE ORAL DAILY
Qty: 30 CAPSULE | Refills: 0 | OUTPATIENT
Start: 2021-01-10 | End: 2021-03-13

## 2021-01-10 RX ORDER — ONDANSETRON 4 MG/1
4 TABLET, ORALLY DISINTEGRATING ORAL ONCE
Status: COMPLETED | OUTPATIENT
Start: 2021-01-10 | End: 2021-01-10

## 2021-01-10 RX ORDER — OXYCODONE AND ACETAMINOPHEN 7.5; 325 MG/1; MG/1
1 TABLET ORAL EVERY 6 HOURS PRN
Qty: 12 TABLET | Refills: 0 | Status: SHIPPED | OUTPATIENT
Start: 2021-01-10 | End: 2021-01-10

## 2021-01-10 RX ORDER — OXYCODONE AND ACETAMINOPHEN 7.5; 325 MG/1; MG/1
1 TABLET ORAL ONCE
Status: COMPLETED | OUTPATIENT
Start: 2021-01-10 | End: 2021-01-10

## 2021-01-10 RX ORDER — SODIUM CHLORIDE 0.9 % (FLUSH) 0.9 %
10 SYRINGE (ML) INJECTION AS NEEDED
Status: DISCONTINUED | OUTPATIENT
Start: 2021-01-10 | End: 2021-01-10 | Stop reason: HOSPADM

## 2021-01-10 RX ADMIN — ONDANSETRON 4 MG: 4 TABLET, ORALLY DISINTEGRATING ORAL at 04:28

## 2021-01-10 RX ADMIN — SODIUM CHLORIDE 1000 ML: 9 INJECTION, SOLUTION INTRAVENOUS at 02:54

## 2021-01-10 RX ADMIN — ONDANSETRON 4 MG: 2 INJECTION INTRAMUSCULAR; INTRAVENOUS at 02:54

## 2021-01-10 RX ADMIN — OXYCODONE HYDROCHLORIDE AND ACETAMINOPHEN 1 TABLET: 7.5; 325 TABLET ORAL at 04:28

## 2021-01-10 RX ADMIN — HYDROMORPHONE HYDROCHLORIDE 1 MG: 1 INJECTION, SOLUTION INTRAMUSCULAR; INTRAVENOUS; SUBCUTANEOUS at 02:54

## 2021-01-10 RX ADMIN — SODIUM CHLORIDE 1000 ML: 9 INJECTION, SOLUTION INTRAVENOUS at 04:08

## 2021-01-10 RX ADMIN — MORPHINE SULFATE 4 MG: 4 INJECTION, SOLUTION INTRAMUSCULAR; INTRAVENOUS at 06:07

## 2021-01-10 NOTE — ED NOTES
[facility] spoke with Priya for consult. Dr. Saldana is on the line with Dr. Rizzo.      Symes, Michelle  01/10/21 5967

## 2021-01-10 NOTE — ED PROVIDER NOTES
Subjective   History of Present Illness  35-year-old female presented to the emergency department [facility] for evaluation of flank pain.  Her blood pressure is elevated.  The patient says that she had extreme flank pain on the left side that started 9 PM abruptly.  She had a intrarenal kidney stone diagnosed on CT scan a few months ago.  She said she has had kidney stones in the past has never had pain like this before.  She is requesting pain medicine.  She has had some nausea and vomiting the pain started.  She was healthy earlier in the day she was not having any pain.  She is complaining of some dysuria that started earlier during the day today.    Review of Systems   Constitutional: Negative.  Negative for fever.   HENT: Negative.    Eyes: Negative.    Respiratory: Negative.    Cardiovascular: Negative.    Gastrointestinal: Negative.    Endocrine: Negative.    Genitourinary: Positive for flank pain.   Skin: Negative.    Allergic/Immunologic: Negative.    Neurological: Negative.    Hematological: Negative.    Psychiatric/Behavioral: Negative.        Past Medical History:   Diagnosis Date   • Abnormal ECG    • Anemia    • Anxiety    • Asthma    • Depression    • Diabetes mellitus (CMS/HCC)    • DVT (deep venous thrombosis) (CMS/HCC)    • Factor 5 Leiden mutation, heterozygous (CMS/HCC)    • Fibroid    • GERD (gastroesophageal reflux disease)    • Gestational diabetes    • Gout    • Hyperlipidemia    • Hypothyroid    • Kidney stone    • Neuropathy    • Ovarian cyst    • PE (pulmonary embolism)    • Polycystic ovary syndrome    • Preeclampsia    • Rh incompatibility    • Urinary tract infection    • Varicella        Allergies   Allergen Reactions   • Amoxicillin Hives   • Haldol [Haloperidol] Hives   • Penicillins Hives   • Toradol [Ketorolac Tromethamine] Hives   • Tramadol Swelling       Past Surgical History:   Procedure Laterality Date   • CARDIAC SURGERY      Heart Cath done in    •  SECTION     •  "CHOLECYSTECTOMY     • COLONOSCOPY         Family History   Problem Relation Age of Onset   • No Known Problems Mother    • No Known Problems Father    • No Known Problems Sister    • No Known Problems Brother    • No Known Problems Son    • No Known Problems Daughter    • No Known Problems Maternal Grandmother    • No Known Problems Maternal Grandfather    • No Known Problems Paternal Grandmother    • No Known Problems Paternal Grandfather    • No Known Problems Cousin    • Rheum arthritis Neg Hx    • Osteoarthritis Neg Hx    • Asthma Neg Hx    • Diabetes Neg Hx    • Heart failure Neg Hx    • Hyperlipidemia Neg Hx    • Hypertension Neg Hx    • Migraines Neg Hx    • Rashes / Skin problems Neg Hx    • Seizures Neg Hx    • Stroke Neg Hx    • Thyroid disease Neg Hx        Social History     Socioeconomic History   • Marital status:      Spouse name: Not on file   • Number of children: Not on file   • Years of education: Not on file   • Highest education level: Not on file   Tobacco Use   • Smoking status: Never Smoker   • Smokeless tobacco: Never Used   Substance and Sexual Activity   • Alcohol use: No   • Drug use: Yes     Comment: Pt has track marks on right arm. Pt states \"It's from my INR being drawn.\"    • Sexual activity: Defer           Objective   Physical Exam  Vitals signs and nursing note reviewed. Exam conducted with a chaperone present.   Constitutional:       Appearance: Normal appearance.      Comments: Tam obese 35-year-old female appears very uncomfortable.  She is not able to sit still she is rocking back and forth in her seat.  She is not diaphoretic.  She is not pale.  She not flushed.  She not ill-appearing she is in moderate discomfort   HENT:      Head: Normocephalic and atraumatic.      Right Ear: External ear normal.      Left Ear: External ear normal.      Nose: Nose normal.      Mouth/Throat:      Mouth: Mucous membranes are moist.      Pharynx: Oropharynx is clear.   Eyes:      " Extraocular Movements: Extraocular movements intact.      Pupils: Pupils are equal, round, and reactive to light.   Neck:      Musculoskeletal: Normal range of motion and neck supple.   Cardiovascular:      Rate and Rhythm: Normal rate and regular rhythm.      Pulses: Normal pulses.      Heart sounds: Normal heart sounds.   Pulmonary:      Effort: Pulmonary effort is normal.      Breath sounds: Normal breath sounds.   Abdominal:      General: Abdomen is flat. Bowel sounds are normal.      Palpations: Abdomen is soft.      Comments: No anterior pain no rebound or guarding.  No rigidity of the anterior abdominal wall.  There is left CVA tenderness that is extreme.   Musculoskeletal: Normal range of motion.   Skin:     General: Skin is warm and dry.      Capillary Refill: Capillary refill takes less than 2 seconds.   Neurological:      General: No focal deficit present.      Mental Status: She is alert and oriented to person, place, and time.   Psychiatric:         Mood and Affect: Mood normal.         Behavior: Behavior normal.         Thought Content: Thought content normal.         Judgment: Judgment normal.         Procedures           ED Course  ED Course as of [place] 10 0614   Sun [place] 10, 2021   0338 Patient is resting comfortably.  We are still awaiting the results of her CT scan.    [JM]   0412 Patient has a 9mm stone in the ureteropelvic junction.     [JM]   0414 Will call [facility] for consult.     [JM]   0542 Received patient checkout from previous team.  35-year-old female coming in for evaluation of flank pain.  Found to have 9 mm stone.  Initially plan for outpatient management however patient has severe pain unable to tolerate p.o. pain medications.  Will discuss for possible transfer.    [JA]   0605 VBG due to CO2 of 19 on CMP.  Normal pH 7.312.    [JA]   0610 Gave patient additional dose of morphine.  Discussed that we are unable to find location to transfer patient this time.  Patient states she  felt improved and was comfortable with discharge home.  Prescriptions sent to pharmacy by previous team.  Will discharge home with close urology follow-up and return precautions.    [JA]      ED Course User Index  [JA] Jayy Rowe MD  [JM] Mook Saldana DO                                           MDM  Number of Diagnoses or Management Options  Nephrolithiasis: new and requires workup     Amount and/or Complexity of Data Reviewed  Clinical lab tests: reviewed  Tests in the radiology section of CPT®: reviewed    Risk of Complications, Morbidity, and/or Mortality  Presenting problems: moderate  Diagnostic procedures: moderate  Management options: moderate        Final diagnoses:   Nephrolithiasis            Jayy Rowe MD  01/10/21 0615

## 2021-01-10 NOTE — ED NOTES
Report received from ROB Shane. Pt sitting up in chair A&OX4, RR even and unlabored, skin WPD. Pt denies pain at this time. Pt declined to have vitals re-checked. Pt awaiting cab to pick her up. SANGEETA. TAMRA. Safety measures remain WDL.      Monie Anglin RN  01/10/21 0785

## 2021-01-10 NOTE — ED NOTES
Patient updated on plan of care and discharge orders, verbalized understanding, no needs or concerns voiced at this time, awaiting [facility] for pick-up.       Svitlana Rocha RN  01/10/21 0690

## 2021-01-10 NOTE — ED NOTES
"Contacted [facility] for discharge transport, states they \"we will send someone that way.\"     Svitlana Rocha RN  01/10/21 9676    "

## 2021-01-10 NOTE — ED NOTES
Called [facility] spoke with Priya. The do not have a bed at this time.     Symes, Heather  01/10/21 0608

## 2021-01-10 NOTE — DISCHARGE INSTRUCTIONS
Follow-up with urology as discussed.  Take medications as prescribed.  Return to the ER symptoms worsen.

## 2021-01-10 NOTE — ED NOTES
Called [facility] spoke with Priya, they do not have a bed at this time.      Symes, Heather  01/10/21 0547

## 2021-01-10 NOTE — ED NOTES
"Updated pt on transfer wait times, pt states, \"I think just want to go home now.\" \"I dont want to wait, I would rather go home and try to deal with it.\" Voices she will pay for cab for pickup. Provider made aware.     Svitlana Rocha, RN  01/10/21 0639    "

## 2021-01-10 NOTE — ED NOTES
Called [facility] for transfer. Spoke with Mayito they do not have any beds.      Symes, Heather  01/10/21 0545       Symes, Heather  01/10/21 0606

## 2021-01-10 NOTE — ED NOTES
"Spoke with pt about plan of care and discharge, pt states \"I just don't feel comfortable going home. I'm in too much pain.\" Provider made aware.     Svitlana Rocha RN  01/10/21 0612    "

## 2021-01-11 LAB — BACTERIA SPEC AEROBE CULT: NORMAL

## 2021-01-15 ENCOUNTER — OFFICE VISIT (OUTPATIENT)
Dept: UROLOGY | Facility: CLINIC | Age: 36
End: 2021-01-15

## 2021-01-15 DIAGNOSIS — N20.0 KIDNEY STONE: ICD-10-CM

## 2021-01-15 DIAGNOSIS — N20.0 RENAL CALCULUS, LEFT: Primary | ICD-10-CM

## 2021-01-15 LAB
BACTERIA SPEC AEROBE CULT: NORMAL
BACTERIA SPEC AEROBE CULT: NORMAL

## 2021-01-15 PROCEDURE — 99204 OFFICE O/P NEW MOD 45 MIN: CPT | Performed by: UROLOGY

## 2021-01-15 RX ORDER — PROMETHAZINE HYDROCHLORIDE 25 MG/1
25 TABLET ORAL EVERY 6 HOURS PRN
Qty: 21 TABLET | Refills: 2 | Status: SHIPPED | OUTPATIENT
Start: 2021-01-15 | End: 2021-03-13 | Stop reason: SDUPTHER

## 2021-01-15 RX ORDER — OXYCODONE AND ACETAMINOPHEN 10; 325 MG/1; MG/1
1 TABLET ORAL EVERY 6 HOURS PRN
Qty: 20 TABLET | Refills: 0 | OUTPATIENT
Start: 2021-01-15 | End: 2021-03-13

## 2021-01-16 PROBLEM — N20.0 RENAL CALCULUS, LEFT: Status: ACTIVE | Noted: 2021-01-16

## 2021-01-16 RX ORDER — GENTAMICIN SULFATE 80 MG/100ML
80 INJECTION, SOLUTION INTRAVENOUS ONCE
Status: CANCELLED | OUTPATIENT
Start: 2021-01-16 | End: 2021-01-16

## 2021-01-16 NOTE — PROGRESS NOTES
Chief Complaint:          Left 9 mm ureteropelvic junction stone.    HPI:   35 y.o. female accompanied by her son here for evaluation of a left 9 mm UPJ stone causing severe pain she came to the emergency room here via ambulance.  She is always had stones she has a strong positive family history she is a factor V Leiden and has had both pulmonary embolus and deep venous thrombosis she has had a cholecystectomy 2 children with via  she has significant medical problems including gout factor V deficiency reflux disease diabetes and hypertension.  She has nausea vomiting I Megan set her up for appropriate lithotripsy as soon as possible.  Renal calculus-we discussed the presence of the stone we discussed the various therapeutic options available including percutaneous nephrostolithotomy, lithotripsy.  We discussed the risks of lithotripsy including the passage of stones the development of a large string of stones in the distal ureter known as Steinstrasse.  In the 3% incidence of that we will need to proceed with a ureteroscopy for obstructing fragments.  Extremely rare incidence of renal hematoma.  And the significance of this.  We discussed the absolute relative indicators for intervention including the presence of sepsis, and pain we cannot control is the primary need for urgent intervention.  We discussed placement of a stent if indicated and the management of the stent as well.   ESWL-the patient is a candidate for extracorporeal shockwave lithotripsy.  We discussed the type of stone.  And the complications associated with the procedure including but not limited to pain in the flank, hematoma, spontaneous renal hemorrhage, and adequate fragmentation of stones.  The need for passage of the stones.  The need for concomitant additional procedures in the range of 24% the risk of a distal fragment in the range of 3% requiring ureteroscopic removal..  Fact that sometimes a stent is indicated based on the size  and the density of the stone as determined on the CAT scan.           Past Medical History:        Past Medical History:   Diagnosis Date   • Abnormal ECG    • Anemia    • Anxiety    • Asthma    • Depression    • Diabetes mellitus (CMS/HCC)    • DVT (deep venous thrombosis) (CMS/HCC)    • Factor 5 Leiden mutation, heterozygous (CMS/HCC)    • Fibroid    • GERD (gastroesophageal reflux disease)    • Gestational diabetes    • Gout    • Hyperlipidemia    • Hypothyroid    • Kidney stone    • Neuropathy    • Ovarian cyst    • PE (pulmonary embolism)    • Polycystic ovary syndrome    • Preeclampsia    • Rh incompatibility    • Urinary tract infection    • Varicella          Current Meds:     Current Outpatient Medications   Medication Sig Dispense Refill   • albuterol (PROVENTIL HFA;VENTOLIN HFA) 108 (90 BASE) MCG/ACT inhaler Inhale 2 puffs every 4 (four) hours as needed for wheezing.     • allopurinol (ZYLOPRIM) 300 MG tablet Take 300 mg by mouth Every Night.     • atorvastatin (LIPITOR) 20 MG tablet Take 20 mg by mouth Every Night.     • cetirizine (ZyrTEC) 10 MG tablet Take 10 mg by mouth Every Night.     • cholecalciferol (VITAMIN D3) 1000 units tablet Take 2,000 Units by mouth Every Night.     • FLUoxetine (PROzac) 40 MG capsule Take 40 mg by mouth Daily.     • fluticasone (FLONASE) 50 MCG/ACT nasal spray 2 sprays into each nostril daily. Administer 2 sprays in each nostril for each dose.     • folic acid (FOLVITE) 1 MG tablet Take 1 mg by mouth Every Night.     • gabapentin (NEURONTIN) 100 MG capsule Take 100 mg by mouth 3 (Three) Times a Day.     • hydrochlorothiazide (HYDRODIURIL) 25 MG tablet Take 25 mg by mouth daily.     • HYDROcodone-acetaminophen (NORCO) 5-325 MG per tablet Take 1 tablet by mouth Every 6 (Six) Hours As Needed for Moderate Pain . 12 tablet 0   • HYDROcodone-acetaminophen (NORCO) 5-325 MG per tablet Take 1 tablet by mouth Every 6 (Six) Hours As Needed for Mild Pain . 12 tablet 0   • Insulin  Glargine (BASAGLAR KWIKPEN) 100 UNIT/ML injection pen Inject 30 Units under the skin into the appropriate area as directed Every Morning.     • levothyroxine (SYNTHROID, LEVOTHROID) 25 MCG tablet Take 25 mcg by mouth daily.     • metFORMIN (GLUCOPHAGE) 500 MG tablet Take 500 mg by mouth 3 (Three) Times a Day.     • metoprolol succinate XL (TOPROL-XL) 25 MG 24 hr tablet Take 25 mg by mouth daily.     • montelukast (SINGULAIR) 10 MG tablet Take 10 mg by mouth every night.     • Multiple Vitamins-Minerals (CENTROVITE) tablet Take 1 tablet by mouth Every Night.     • Omega-3 Fatty Acids (FISH OIL) 1000 MG capsule capsule Take 2,000 mg by mouth 2 (Two) Times a Day.     • omeprazole (priLOSEC) 20 MG capsule Take 20 mg by mouth Daily With Dinner.     • ondansetron ODT (ZOFRAN-ODT) 4 MG disintegrating tablet Take 1 tablet by mouth Every 6 (Six) Hours As Needed for Vomiting. 20 tablet 0   • ondansetron ODT (ZOFRAN-ODT) 4 MG disintegrating tablet Place 1 tablet on the tongue Every 8 (Eight) Hours As Needed for Nausea or Vomiting. 21 tablet 0   • orphenadrine (NORFLEX) 100 MG 12 hr tablet Take 1 tablet by mouth 2 (Two) Times a Day As Needed for Muscle Spasms or Mild Pain . 20 tablet 0   • oxyCODONE-acetaminophen (Percocet)  MG per tablet Take 1 tablet by mouth Every 6 (Six) Hours As Needed for Moderate Pain . 20 tablet 0   • phenazopyridine (PYRIDIUM) 200 MG tablet Take 1 tablet by mouth 3 (Three) Times a Day As Needed for Bladder Spasms. 6 tablet 0   • promethazine (PHENERGAN) 25 MG tablet Take 1 tablet by mouth Every 6 (Six) Hours As Needed for Nausea or Vomiting. 21 tablet 2   • SUMAtriptan (IMITREX) 100 MG tablet Take 100 mg by mouth every 2 (two) hours as needed for migraine.     • tamsulosin (FLOMAX) 0.4 MG capsule 24 hr capsule Take 1 capsule by mouth Daily. 30 capsule 0   • tiZANidine (ZANAFLEX) 4 MG tablet Take 4 mg by mouth 3 (Three) Times a Day.     • topiramate (TOPAMAX) 100 MG tablet Take 100 mg by mouth 2  "(two) times a day.     • vitamin B-12 (CYANOCOBALAMIN) 500 MCG tablet Take 500 mcg by mouth daily.     • vitamin C (ASCORBIC ACID) 500 MG tablet Take 500 mg by mouth Daily.     • warfarin (COUMADIN) 10 MG tablet Take 10 mg by mouth 4 (Four) Times a Week. Prior to [facility] Admission, Patient was on:          No current facility-administered medications for this visit.         Allergies:      Allergies   Allergen Reactions   • Amoxicillin Hives   • Haldol [Haloperidol] Hives   • Penicillins Hives   • Toradol [Ketorolac Tromethamine] Hives   • Tramadol Swelling        Past Surgical History:     Past Surgical History:   Procedure Laterality Date   • CARDIAC SURGERY      Heart Cath done in    •  SECTION     • CHOLECYSTECTOMY     • COLONOSCOPY           Social History:     Social History     Socioeconomic History   • Marital status:      Spouse name: Not on file   • Number of children: Not on file   • Years of education: Not on file   • Highest education level: Not on file   Tobacco Use   • Smoking status: Never Smoker   • Smokeless tobacco: Never Used   Substance and Sexual Activity   • Alcohol use: No   • Drug use: Yes     Comment: Pt has track marks on right arm. Pt states \"It's from my INR being drawn.\"    • Sexual activity: Defer       Family History:     Family History   Problem Relation Age of Onset   • No Known Problems Mother    • No Known Problems Father    • No Known Problems Sister    • No Known Problems Brother    • No Known Problems Son    • No Known Problems Daughter    • No Known Problems Maternal Grandmother    • No Known Problems Maternal Grandfather    • No Known Problems Paternal Grandmother    • No Known Problems Paternal Grandfather    • No Known Problems Cousin    • Rheum arthritis Neg Hx    • Osteoarthritis Neg Hx    • Asthma Neg Hx    • Diabetes Neg Hx    • Heart failure Neg Hx    • Hyperlipidemia Neg Hx    • Hypertension Neg Hx    • Migraines " Neg Hx    • Rashes / Skin problems Neg Hx    • Seizures Neg Hx    • Stroke Neg Hx    • Thyroid disease Neg Hx        Review of Systems:     Review of Systems   Constitutional: Negative.  Negative for activity change, appetite change, chills, diaphoresis, fatigue and unexpected weight change.   HENT: Negative for congestion, dental problem, drooling, ear discharge, ear pain, facial swelling, hearing loss, mouth sores, nosebleeds, postnasal drip, rhinorrhea, sinus pressure, sneezing, sore throat, tinnitus, trouble swallowing and voice change.    Eyes: Negative.  Negative for photophobia, pain, discharge, redness, itching and visual disturbance.   Respiratory: Negative.  Negative for apnea, cough, choking, chest tightness, shortness of breath, wheezing and stridor.    Cardiovascular: Negative.  Negative for chest pain, palpitations and leg swelling.   Gastrointestinal: Positive for nausea and vomiting. Negative for abdominal distention, abdominal pain, anal bleeding, blood in stool, constipation, diarrhea and rectal pain.   Endocrine: Negative.  Negative for cold intolerance, heat intolerance, polydipsia, polyphagia and polyuria.   Genitourinary: Positive for flank pain.   Musculoskeletal: Negative for arthralgias, back pain, gait problem, joint swelling, myalgias, neck pain and neck stiffness.   Skin: Negative.  Negative for color change, pallor, rash and wound.   Allergic/Immunologic: Negative.  Negative for environmental allergies, food allergies and immunocompromised state.   Neurological: Negative.  Negative for dizziness, tremors, seizures, syncope, facial asymmetry, speech difficulty, weakness, light-headedness, numbness and headaches.   Hematological: Negative.  Negative for adenopathy. Does not bruise/bleed easily.   Psychiatric/Behavioral: Negative for agitation, behavioral problems, confusion, decreased concentration, dysphoric mood, hallucinations, self-injury, sleep disturbance and suicidal ideas. The  patient is not nervous/anxious and is not hyperactive.    All other systems reviewed and are negative.      Physical Exam:     Physical Exam  Constitutional:       Appearance: She is well-developed.   HENT:      Head: Normocephalic and atraumatic.      Right Ear: External ear normal.      Left Ear: External ear normal.   Eyes:      Conjunctiva/sclera: Conjunctivae normal.      Pupils: Pupils are equal, round, and reactive to light.   Cardiovascular:      Rate and Rhythm: Normal rate and regular rhythm.      Heart sounds: Normal heart sounds.   Pulmonary:      Effort: Pulmonary effort is normal.      Breath sounds: Normal breath sounds.   Abdominal:      General: Bowel sounds are normal. There is no distension.      Palpations: Abdomen is soft. There is no mass.      Tenderness: There is no abdominal tenderness. There is no guarding or rebound.   Genitourinary:     Vagina: No vaginal discharge.   Musculoskeletal: Normal range of motion.   Skin:     General: Skin is warm and dry.   Neurological:      Mental Status: She is alert.      Deep Tendon Reflexes: Reflexes are normal and symmetric.   Psychiatric:         Behavior: Behavior normal.         Thought Content: Thought content normal.         Judgment: Judgment normal.         I have reviewed the following portions of the patient's history: allergies, current medications, past family history, past medical history, past social history, past surgical history, problem list and ROS and confirm it's accurate.      Procedure:       Assessment/Plan:   Renal calculus-we discussed the presence of the stone we discussed the various therapeutic options available including percutaneous nephrostolithotomy, lithotripsy.  We discussed the risks of lithotripsy including the passage of stones the development of a large string of stones in the distal ureter known as Steinstrasse.  In the 3% incidence of that we will need to proceed with a ureteroscopy for obstructing fragments.   Extremely rare incidence of renal hematoma.  And the significance of this.  We discussed the absolute relative indicators for intervention including the presence of sepsis, and pain we cannot control is the primary need for urgent intervention.  We discussed placement of a stent if indicated and the management of the stent as well.  For lithotripsy on January 22  Narcotic pain medication-patient has significant acute pain that I believe would be an indication for the use of narcotic pain medication.  I discussed the significant risks of pain medication and the fact that this will be a short only option and I will give her no more than a three-day supply of pain medication.  And I will not plan long-term medication and that this will be sent to a pain clinic if at all becomes necessary.  We discussed signing a pain medication agreement and the fact that we're going to run a state [facility] review to be sure the patient is not getting pain medication from elsewhere.  If this is the case we will not give pain medication.  As part of the patient's treatment plan of there being prescribed a controlled substance with potential for abuse.  This patient has been wait aware of the appropriate dose of such medications including, the risk for somnolence, limited ability to drive and/or safety and the significant potential for overdose.  It is been made clear that these medications are for the prescribed patient only without concomitant use of alcohol or other sepsis unless prescribed by the medical provider.  Has completed prescribing agreement detailing the terms of continue prescribing him a controlled substance.  Including monitoring Pranay ports, the possibility of urine drug screens and pedal counts.  The patient is aware that we reviewed PRANAY reports on a regular basis and scan them into the chart.  History and physical examination exhibited continued safe and appropriate use of controlled substances.  Also discussed the  fact that the new [place] legislation allows only a three-day prescription for pain medication.  In this situation he will be referred to a chronic pain clinic.            Patient's There is no height or weight on file to calculate BMI. BMI is above normal parameters. Recommendations include: educational material.              This document has been electronically signed by BRITTANY ELIZONDO MD January 16, 2021 12:38 EST

## 2021-01-19 ENCOUNTER — PREP FOR SURGERY (OUTPATIENT)
Dept: OTHER | Facility: HOSPITAL | Age: 36
End: 2021-01-19

## 2021-01-19 DIAGNOSIS — N20.0 RENAL CALCULUS, LEFT: Primary | ICD-10-CM

## 2021-01-20 ENCOUNTER — APPOINTMENT (OUTPATIENT)
Dept: PREADMISSION TESTING | Facility: HOSPITAL | Age: 36
End: 2021-01-20

## 2021-01-20 ENCOUNTER — LAB (OUTPATIENT)
Dept: LAB | Facility: HOSPITAL | Age: 36
End: 2021-01-20

## 2021-01-20 DIAGNOSIS — N20.0 RENAL CALCULUS, LEFT: ICD-10-CM

## 2021-01-20 LAB
ANION GAP SERPL CALCULATED.3IONS-SCNC: 12.1 MMOL/L (ref 5–15)
BASOPHILS # BLD AUTO: 0.01 10*3/MM3 (ref 0–0.2)
BASOPHILS NFR BLD AUTO: 0.1 % (ref 0–1.5)
BUN SERPL-MCNC: 8 MG/DL (ref 6–20)
BUN/CREAT SERPL: 9.8 (ref 7–25)
CALCIUM SPEC-SCNC: 8.7 MG/DL (ref 8.6–10.5)
CHLORIDE SERPL-SCNC: 86 MMOL/L (ref 98–107)
CO2 SERPL-SCNC: 28.9 MMOL/L (ref 22–29)
CREAT SERPL-MCNC: 0.82 MG/DL (ref 0.57–1)
DEPRECATED RDW RBC AUTO: 45.2 FL (ref 37–54)
EOSINOPHIL # BLD AUTO: 0.02 10*3/MM3 (ref 0–0.4)
EOSINOPHIL NFR BLD AUTO: 0.3 % (ref 0.3–6.2)
ERYTHROCYTE [DISTWIDTH] IN BLOOD BY AUTOMATED COUNT: 14 % (ref 12.3–15.4)
GFR SERPL CREATININE-BSD FRML MDRD: 80 ML/MIN/1.73
GLUCOSE SERPL-MCNC: 328 MG/DL (ref 65–99)
HCT VFR BLD AUTO: 32.7 % (ref 34–46.6)
HGB BLD-MCNC: 10.5 G/DL (ref 12–15.9)
IMM GRANULOCYTES # BLD AUTO: 0.03 10*3/MM3 (ref 0–0.05)
IMM GRANULOCYTES NFR BLD AUTO: 0.4 % (ref 0–0.5)
LYMPHOCYTES # BLD AUTO: 0.68 10*3/MM3 (ref 0.7–3.1)
LYMPHOCYTES NFR BLD AUTO: 10.1 % (ref 19.6–45.3)
MCH RBC QN AUTO: 28.7 PG (ref 26.6–33)
MCHC RBC AUTO-ENTMCNC: 32.1 G/DL (ref 31.5–35.7)
MCV RBC AUTO: 89.3 FL (ref 79–97)
MONOCYTES # BLD AUTO: 0.38 10*3/MM3 (ref 0.1–0.9)
MONOCYTES NFR BLD AUTO: 5.7 % (ref 5–12)
NEUTROPHILS NFR BLD AUTO: 5.58 10*3/MM3 (ref 1.7–7)
NEUTROPHILS NFR BLD AUTO: 83.4 % (ref 42.7–76)
NRBC BLD AUTO-RTO: 0 /100 WBC (ref 0–0.2)
PLATELET # BLD AUTO: 291 10*3/MM3 (ref 140–450)
PMV BLD AUTO: 10.1 FL (ref 6–12)
POTASSIUM SERPL-SCNC: 3.1 MMOL/L (ref 3.5–5.2)
RBC # BLD AUTO: 3.66 10*6/MM3 (ref 3.77–5.28)
SODIUM SERPL-SCNC: 127 MMOL/L (ref 136–145)
WBC # BLD AUTO: 6.7 10*3/MM3 (ref 3.4–10.8)

## 2021-01-20 PROCEDURE — 93010 ELECTROCARDIOGRAM REPORT: CPT | Performed by: INTERNAL MEDICINE

## 2021-01-20 PROCEDURE — U0004 COV-19 TEST NON-CDC HGH THRU: HCPCS | Performed by: UROLOGY

## 2021-01-20 PROCEDURE — 80048 BASIC METABOLIC PNL TOTAL CA: CPT

## 2021-01-20 PROCEDURE — 93005 ELECTROCARDIOGRAM TRACING: CPT

## 2021-01-20 PROCEDURE — 85025 COMPLETE CBC W/AUTO DIFF WBC: CPT

## 2021-01-20 PROCEDURE — 36415 COLL VENOUS BLD VENIPUNCTURE: CPT

## 2021-01-20 NOTE — DISCHARGE INSTRUCTIONS
TAKE the following medications the morning of surgery:    All heart or blood pressure medications    Please discontinue all blood thinners and anticoagulants (except aspirin) prior to surgery as per your surgeon and cardiologist instructions.  Aspirin may be continued up to the day prior to surgery.    HOLD all diabetic medications the morning of surgery as order by physician.    Please follow instructions on use of prep cloths provided by nurse. Return instruction sheet to pre-op nurse on day of surgery.    Arrival time for surgery on 1/22/21 will be given to you by Dr. Motley's office.    A RESPONSIBLE PERSON MUST REMAIN IN THE WAITING ROOM DURING YOUR PROCEDURE AND A RESPONSIBLE  MUST BE AVAILABLE UPON YOUR DISCHARGE.    General Instructions:  • Do NOT eat or drink after midnight 1/21/21 which includes water, mints, or gum.  • You may brush your teeth. Dental appliances that are removable must be taken out day of surgery.  • Do NOT smoke, chew tobacco, or drink alcohol within 24 hours prior to surgery.  • Bring medications in original bottles, any inhalers and if applicable your C-PAP/BI-PAP machine  • Bring any papers given to you in the doctor’s office  • Wear clean, comfortable clothes and socks  • Do NOT wear contact lenses or make-up or dark nail polish.  Bring a case for your glasses if applicable.  • Bring crutches or walker if applicable  • Leave all other valuables and jewelry at home  • If you were given a blood bank armband, continue to wear it until discharged.    Preventing a Surgical Site Infection:  • Shower the night before surgery (unless instructed otherwise) using a fresh bar of anti-bacterial soap (such as Dial) and clean washcloth.  Dry with a clean towel and dress in clean clothing.  • For 2 to 3 days before surgery, avoid shaving with a razor near where you will have surgery because the razor can irritate skin and make it easier to develop an infection.  Ask your surgeon if you will  be receiving antibiotics prior to surgery.  • Make sure you, your family, and all healthcare providers clean their hands with soap and water or an alcohol-based hand  before caring for you or your wound.  • If at all possible, quit smoking as many days before surgery as you can.    Day of Surgery:  Upon arrival, a pre-op nurse and anesthesiologist will review your health history, obtain vital signs, and answer questions you may have.  The only belongings needed at this time will be your home medications and if applicable you C-PAP/BI-PAP machine.  If you are staying overnight, your family can leave the rest of your belongings in the car and bring them to your room later.  A pre-op nurse will start an IV and you may receive medication in preparation for surgery.  Due to patient privacy and limited space, only one member of your family will be able to accompany you in the pre-op area.  While you are in surgery your family should notify the waiting room  if they leave the waiting room area and provide a contact number.  Please be aware that surgery does come with discomfort.  We want to make every effort to control your discomfort so please discuss any uncontrolled symptoms with your nurse.  Your doctor will most likely have prescribed pain medications.  If you are going home after surgery you will receive individualized written care instructions before being discharged.  A responsible adult must drive you to and from the hospital on the day of surgery and stay with you for 24 hours.  If you are staying overnight following surgery, you will be transported to your hospital room following the recovery period.

## 2021-01-21 LAB
QT INTERVAL: 364 MS
QTC INTERVAL: 481 MS
SARS-COV-2 RNA RESP QL NAA+PROBE: NOT DETECTED

## 2021-01-22 ENCOUNTER — APPOINTMENT (OUTPATIENT)
Dept: GENERAL RADIOLOGY | Facility: HOSPITAL | Age: 36
End: 2021-01-22

## 2021-01-22 ENCOUNTER — PATIENT MESSAGE (OUTPATIENT)
Dept: UROLOGY | Facility: CLINIC | Age: 36
End: 2021-01-22

## 2021-01-22 ENCOUNTER — HOSPITAL ENCOUNTER (EMERGENCY)
Facility: HOSPITAL | Age: 36
Discharge: LEFT BEFORE DISCHARGE | End: 2021-01-22
Attending: STUDENT IN AN ORGANIZED HEALTH CARE EDUCATION/TRAINING PROGRAM | Admitting: STUDENT IN AN ORGANIZED HEALTH CARE EDUCATION/TRAINING PROGRAM

## 2021-01-22 ENCOUNTER — TELEPHONE (OUTPATIENT)
Dept: UROLOGY | Facility: CLINIC | Age: 36
End: 2021-01-22

## 2021-01-22 VITALS
BODY MASS INDEX: 50.02 KG/M2 | HEART RATE: 117 BPM | OXYGEN SATURATION: 95 % | RESPIRATION RATE: 16 BRPM | SYSTOLIC BLOOD PRESSURE: 159 MMHG | TEMPERATURE: 100.8 F | HEIGHT: 64 IN | WEIGHT: 293 LBS | DIASTOLIC BLOOD PRESSURE: 81 MMHG

## 2021-01-22 DIAGNOSIS — R50.9 FEVER, UNSPECIFIED FEVER CAUSE: Primary | ICD-10-CM

## 2021-01-22 DIAGNOSIS — N20.0 RENAL STONE: ICD-10-CM

## 2021-01-22 PROCEDURE — 71045 X-RAY EXAM CHEST 1 VIEW: CPT | Performed by: RADIOLOGY

## 2021-01-22 PROCEDURE — 99282 EMERGENCY DEPT VISIT SF MDM: CPT

## 2021-01-22 PROCEDURE — 99283 EMERGENCY DEPT VISIT LOW MDM: CPT

## 2021-01-22 PROCEDURE — 71045 X-RAY EXAM CHEST 1 VIEW: CPT

## 2021-01-22 PROCEDURE — 99211 OFF/OP EST MAY X REQ PHY/QHP: CPT | Performed by: STUDENT IN AN ORGANIZED HEALTH CARE EDUCATION/TRAINING PROGRAM

## 2021-01-22 RX ORDER — ACETAMINOPHEN 500 MG
1000 TABLET ORAL ONCE
Status: COMPLETED | OUTPATIENT
Start: 2021-01-22 | End: 2021-01-22

## 2021-01-22 RX ADMIN — ACETAMINOPHEN 1000 MG: 500 TABLET ORAL at 09:03

## 2021-01-22 NOTE — ED PROVIDER NOTES
Subjective   35-year-old female presents secondary to fever.  Patient was to have a lithotripsy on a left-sided renal stone.  Patient presented to preop and had a fever of 104.  Patient states that she felt fine until this morning and was unaware that she had a fever.  She denies any cough or congestion.  No exposure to COVID-19.  She recent had a negative Covid swab as part of her preop work-up.  She denies any vomiting.  She states that she has had nausea.  No diarrhea.  She has had some intermittent left-sided flank pain.  She voices no other complaints at this time.          Review of Systems   Constitutional: Negative.  Negative for fever.   HENT: Negative.    Respiratory: Negative.    Cardiovascular: Negative.  Negative for chest pain.   Gastrointestinal: Positive for nausea. Negative for abdominal pain.   Endocrine: Negative.    Genitourinary: Positive for flank pain. Negative for dysuria.   Skin: Negative.    Neurological: Negative.    Psychiatric/Behavioral: Negative.    All other systems reviewed and are negative.      Past Medical History:   Diagnosis Date   • Abnormal ECG    • Anemia    • Anxiety    • Asthma    • Depression    • Diabetes mellitus (CMS/HCC)    • DVT (deep venous thrombosis) (CMS/HCC)    • Factor 5 Leiden mutation, heterozygous (CMS/HCC)    • Fibroid    • GERD (gastroesophageal reflux disease)    • Gestational diabetes    • Gout    • Hyperlipidemia    • Hypothyroid    • Kidney stone    • Migraines    • Neuropathy    • Ovarian cyst    • PE (pulmonary embolism)    • Polycystic ovary syndrome    • Preeclampsia    • Rh incompatibility    • Urinary tract infection    • Varicella        Allergies   Allergen Reactions   • Amoxicillin Hives   • Haldol [Haloperidol] Hives   • Penicillins Hives   • Toradol [Ketorolac Tromethamine] Hives   • Tramadol Swelling       Past Surgical History:   Procedure Laterality Date   • CARDIAC CATHETERIZATION     •  SECTION     • CHOLECYSTECTOMY     •  "COLONOSCOPY         Family History   Problem Relation Age of Onset   • No Known Problems Mother    • No Known Problems Father    • No Known Problems Sister    • No Known Problems Brother    • No Known Problems Son    • No Known Problems Daughter    • No Known Problems Maternal Grandmother    • No Known Problems Maternal Grandfather    • No Known Problems Paternal Grandmother    • No Known Problems Paternal Grandfather    • No Known Problems Cousin    • Rheum arthritis Neg Hx    • Osteoarthritis Neg Hx    • Asthma Neg Hx    • Diabetes Neg Hx    • Heart failure Neg Hx    • Hyperlipidemia Neg Hx    • Hypertension Neg Hx    • Migraines Neg Hx    • Rashes / Skin problems Neg Hx    • Seizures Neg Hx    • Stroke Neg Hx    • Thyroid disease Neg Hx        Social History     Socioeconomic History   • Marital status:      Spouse name: Not on file   • Number of children: Not on file   • Years of education: Not on file   • Highest education level: Not on file   Tobacco Use   • Smoking status: Never Smoker   • Smokeless tobacco: Never Used   Substance and Sexual Activity   • Alcohol use: No   • Drug use: Never     Comment: Pt has track marks on right arm. Pt states \"It's from my INR being drawn.\"    • Sexual activity: Defer           Objective   Physical Exam  Vitals signs and nursing note reviewed.   Constitutional:       General: She is not in acute distress.     Appearance: She is well-developed. She is not diaphoretic.   HENT:      Head: Normocephalic and atraumatic.      Right Ear: External ear normal.      Left Ear: External ear normal.      Nose: Nose normal.   Eyes:      Conjunctiva/sclera: Conjunctivae normal.      Pupils: Pupils are equal, round, and reactive to light.   Neck:      Musculoskeletal: Normal range of motion and neck supple.      Vascular: No JVD.      Trachea: No tracheal deviation.   Cardiovascular:      Rate and Rhythm: Normal rate and regular rhythm.      Heart sounds: Normal heart sounds. No " murmur.   Pulmonary:      Effort: Pulmonary effort is normal. No respiratory distress.      Breath sounds: Normal breath sounds. No wheezing.   Abdominal:      General: Bowel sounds are normal.      Palpations: Abdomen is soft.      Tenderness: There is no abdominal tenderness.   Musculoskeletal: Normal range of motion.         General: No deformity.   Skin:     General: Skin is warm and dry.      Coloration: Skin is not pale.      Findings: No erythema or rash.   Neurological:      Mental Status: She is alert and oriented to person, place, and time.      Cranial Nerves: No cranial nerve deficit.   Psychiatric:         Behavior: Behavior normal.         Thought Content: Thought content normal.         Procedures           ED Course  ED Course as of Jan 22 1914 Fri Jan 22, 2021 1912 Patient signed out prior to any work-up.  She had not been in the ER at all.  She was counseled before about the possibility of having a septic kidney stone.  Dr. Motley was made aware.    [JI]      ED Course User Index  [JI] Roly Cope PA                                           MDM  Number of Diagnoses or Management Options  Fever, unspecified fever cause: new and requires workup  Renal stone: established and worsening     Amount and/or Complexity of Data Reviewed  Clinical lab tests: ordered        Final diagnoses:   Fever, unspecified fever cause   Renal stone            Roly Cope PA  01/22/21 1914

## 2021-01-22 NOTE — ED NOTES
Pt came to triage and reported that she wanted to go home, pt reports that she does not want to sit in er ramón, encouraged pt to stay for completion of treatment plan, pt refuses and reports she will be back if symptoms worsen, forms signed, left ambulatory.     Gail Mcghee, RN  01/22/21 0935

## 2021-01-22 NOTE — TELEPHONE ENCOUNTER
Natalia Arzate's message was noted.  As I explained to her it is imperative that we get a hold of her with a temp of 106.  She was brought to emergency room but failed to have any of her labs done and then texted the office wanting to know what to do.  My biggest concern is that we have a septic stone.  We are attempting to get a hold of her.  Or at least have her go to the emergency room for evaluation.  Certainly, no elective surgery can be done in the face of a high temp without a definitive diagnosis she understands this could be a life-threatening event

## 2021-01-25 NOTE — ED NOTES
Report given to ROB Domingo Richi, RN  04/25/19 6900    
Ultrasound called requesting pt to have a full bladder for test. Dr. Narayanan approved and pt now has water at bedside and encouraged her to drink it as soon as possible for test.     Domenica Mesa  04/25/19 4379    
yolie request #[id number]     Domenica Mesa  04/25/19 1144    
normal...

## 2021-02-27 ENCOUNTER — HOSPITAL ENCOUNTER (EMERGENCY)
Facility: HOSPITAL | Age: 36
Discharge: HOME | End: 2021-02-28
Attending: FAMILY MEDICINE | Admitting: FAMILY MEDICINE

## 2021-02-27 DIAGNOSIS — R73.9 HYPERGLYCEMIA: ICD-10-CM

## 2021-02-27 DIAGNOSIS — R10.9 FLANK PAIN: Primary | ICD-10-CM

## 2021-02-27 DIAGNOSIS — S30.1XXA ABDOMINAL WALL SEROMA, INITIAL ENCOUNTER: ICD-10-CM

## 2021-02-27 PROCEDURE — 93005 ELECTROCARDIOGRAM TRACING: CPT | Performed by: FAMILY MEDICINE

## 2021-02-27 PROCEDURE — 99283 EMERGENCY DEPT VISIT LOW MDM: CPT

## 2021-02-27 RX ORDER — HYDROMORPHONE HYDROCHLORIDE 1 MG/ML
0.5 INJECTION, SOLUTION INTRAMUSCULAR; INTRAVENOUS; SUBCUTANEOUS ONCE
Status: COMPLETED | OUTPATIENT
Start: 2021-02-27 | End: 2021-02-28

## 2021-02-27 RX ORDER — SODIUM CHLORIDE 0.9 % (FLUSH) 0.9 %
10 SYRINGE (ML) INJECTION AS NEEDED
Status: DISCONTINUED | OUTPATIENT
Start: 2021-02-27 | End: 2021-02-28 | Stop reason: HOSPADM

## 2021-02-28 ENCOUNTER — APPOINTMENT (OUTPATIENT)
Dept: CT IMAGING | Facility: HOSPITAL | Age: 36
End: 2021-02-28

## 2021-02-28 VITALS
TEMPERATURE: 98.6 F | WEIGHT: 290 LBS | OXYGEN SATURATION: 94 % | DIASTOLIC BLOOD PRESSURE: 88 MMHG | BODY MASS INDEX: 49.51 KG/M2 | SYSTOLIC BLOOD PRESSURE: 148 MMHG | HEART RATE: 103 BPM | RESPIRATION RATE: 18 BRPM | HEIGHT: 64 IN

## 2021-02-28 LAB
ALBUMIN SERPL-MCNC: 4.15 G/DL (ref 3.5–5.2)
ALBUMIN/GLOB SERPL: 1 G/DL
ALP SERPL-CCNC: 100 U/L (ref 39–117)
ALT SERPL W P-5'-P-CCNC: 34 U/L (ref 1–33)
ANION GAP SERPL CALCULATED.3IONS-SCNC: 18 MMOL/L (ref 5–15)
AST SERPL-CCNC: 34 U/L (ref 1–32)
ATMOSPHERIC PRESS: 726 MMHG
BACTERIA UR QL AUTO: ABNORMAL /HPF
BASE EXCESS BLDV CALC-SCNC: -2.1 MMOL/L (ref 0–2)
BASOPHILS # BLD AUTO: 0.04 10*3/MM3 (ref 0–0.2)
BASOPHILS NFR BLD AUTO: 0.7 % (ref 0–1.5)
BDY SITE: ABNORMAL
BILIRUB SERPL-MCNC: 0.4 MG/DL (ref 0–1.2)
BILIRUB UR QL STRIP: NEGATIVE
BODY TEMPERATURE: 37 C
BUN SERPL-MCNC: 13 MG/DL (ref 6–20)
BUN/CREAT SERPL: 16.9 (ref 7–25)
CALCIUM SPEC-SCNC: 10.1 MG/DL (ref 8.6–10.5)
CHLORIDE SERPL-SCNC: 94 MMOL/L (ref 98–107)
CLARITY UR: ABNORMAL
CO2 BLDA-SCNC: 24.3 MMOL/L (ref 22–33)
CO2 SERPL-SCNC: 21 MMOL/L (ref 22–29)
COHGB MFR BLD: 1.5 % (ref 0–5)
COLOR UR: YELLOW
CREAT SERPL-MCNC: 0.77 MG/DL (ref 0.57–1)
D-LACTATE SERPL-SCNC: 2.4 MMOL/L (ref 0.5–2)
D-LACTATE SERPL-SCNC: 3.1 MMOL/L (ref 0.5–2)
DEPRECATED RDW RBC AUTO: 46.4 FL (ref 37–54)
EOSINOPHIL # BLD AUTO: 0.14 10*3/MM3 (ref 0–0.4)
EOSINOPHIL NFR BLD AUTO: 2.6 % (ref 0.3–6.2)
ERYTHROCYTE [DISTWIDTH] IN BLOOD BY AUTOMATED COUNT: 14.6 % (ref 12.3–15.4)
GFR SERPL CREATININE-BSD FRML MDRD: 86 ML/MIN/1.73
GLOBULIN UR ELPH-MCNC: 4.2 GM/DL
GLUCOSE BLDC GLUCOMTR-MCNC: 280 MG/DL (ref 70–130)
GLUCOSE SERPL-MCNC: 371 MG/DL (ref 65–99)
GLUCOSE UR STRIP-MCNC: ABNORMAL MG/DL
HCG SERPL QL: NEGATIVE
HCO3 BLDV-SCNC: 23.1 MMOL/L (ref 22–28)
HCT VFR BLD AUTO: 36.9 % (ref 34–46.6)
HGB BLD-MCNC: 12.3 G/DL (ref 12–15.9)
HGB BLDA-MCNC: 12.3 G/DL (ref 13.5–17.5)
HGB UR QL STRIP.AUTO: NEGATIVE
HOLD SPECIMEN: NORMAL
HYALINE CASTS UR QL AUTO: ABNORMAL /LPF
IMM GRANULOCYTES # BLD AUTO: 0.02 10*3/MM3 (ref 0–0.05)
IMM GRANULOCYTES NFR BLD AUTO: 0.4 % (ref 0–0.5)
INHALED O2 CONCENTRATION: 21 %
INR PPP: 0.93 (ref 0.9–1.1)
KETONES UR QL STRIP: NEGATIVE
LACTATE HOLD SPECIMEN: NORMAL
LEUKOCYTE ESTERASE UR QL STRIP.AUTO: ABNORMAL
LIPASE SERPL-CCNC: 45 U/L (ref 13–60)
LYMPHOCYTES # BLD AUTO: 1.31 10*3/MM3 (ref 0.7–3.1)
LYMPHOCYTES NFR BLD AUTO: 24.1 % (ref 19.6–45.3)
Lab: ABNORMAL
MCH RBC QN AUTO: 28.9 PG (ref 26.6–33)
MCHC RBC AUTO-ENTMCNC: 33.3 G/DL (ref 31.5–35.7)
MCV RBC AUTO: 86.6 FL (ref 79–97)
METHGB BLD QL: 0.3 % (ref 0–3)
MODALITY: ABNORMAL
MONOCYTES # BLD AUTO: 0.38 10*3/MM3 (ref 0.1–0.9)
MONOCYTES NFR BLD AUTO: 7 % (ref 5–12)
NEUTROPHILS NFR BLD AUTO: 3.54 10*3/MM3 (ref 1.7–7)
NEUTROPHILS NFR BLD AUTO: 65.2 % (ref 42.7–76)
NITRITE UR QL STRIP: NEGATIVE
NRBC BLD AUTO-RTO: 0 /100 WBC (ref 0–0.2)
OXYHGB MFR BLDV: 94.2 % (ref 45–75)
PCO2 BLDV: 40.3 MM HG (ref 41–51)
PH BLDV: 7.37 PH UNITS (ref 7.32–7.42)
PH UR STRIP.AUTO: <=5 [PH] (ref 5–8)
PLATELET # BLD AUTO: 314 10*3/MM3 (ref 140–450)
PMV BLD AUTO: 8.8 FL (ref 6–12)
PO2 BLDV: 80.7 MM HG (ref 27–53)
POTASSIUM SERPL-SCNC: 4.5 MMOL/L (ref 3.5–5.2)
PROT SERPL-MCNC: 8.3 G/DL (ref 6–8.5)
PROT UR QL STRIP: ABNORMAL
PROTHROMBIN TIME: 12.3 SECONDS (ref 11.9–14.1)
QT INTERVAL: 344 MS
QTC INTERVAL: 460 MS
RBC # BLD AUTO: 4.26 10*6/MM3 (ref 3.77–5.28)
RBC # UR: ABNORMAL /HPF
REF LAB TEST METHOD: ABNORMAL
SODIUM SERPL-SCNC: 133 MMOL/L (ref 136–145)
SP GR UR STRIP: 1.02 (ref 1–1.03)
SQUAMOUS #/AREA URNS HPF: ABNORMAL /HPF
TROPONIN T SERPL-MCNC: <0.01 NG/ML (ref 0–0.03)
UROBILINOGEN UR QL STRIP: ABNORMAL
VENTILATOR MODE: ABNORMAL
WBC # BLD AUTO: 5.43 10*3/MM3 (ref 3.4–10.8)
WBC UR QL AUTO: ABNORMAL /HPF
WHOLE BLOOD HOLD SPECIMEN: NORMAL
WHOLE BLOOD HOLD SPECIMEN: NORMAL

## 2021-02-28 PROCEDURE — 83605 ASSAY OF LACTIC ACID: CPT | Performed by: FAMILY MEDICINE

## 2021-02-28 PROCEDURE — 93010 ELECTROCARDIOGRAM REPORT: CPT | Performed by: INTERNAL MEDICINE

## 2021-02-28 PROCEDURE — 82962 GLUCOSE BLOOD TEST: CPT

## 2021-02-28 PROCEDURE — 84703 CHORIONIC GONADOTROPIN ASSAY: CPT | Performed by: FAMILY MEDICINE

## 2021-02-28 PROCEDURE — 82805 BLOOD GASES W/O2 SATURATION: CPT

## 2021-02-28 PROCEDURE — 74176 CT ABD & PELVIS W/O CONTRAST: CPT

## 2021-02-28 PROCEDURE — 82820 HEMOGLOBIN-OXYGEN AFFINITY: CPT

## 2021-02-28 PROCEDURE — 25010000002 IOPAMIDOL 61 % SOLUTION: Performed by: FAMILY MEDICINE

## 2021-02-28 PROCEDURE — 85025 COMPLETE CBC W/AUTO DIFF WBC: CPT | Performed by: FAMILY MEDICINE

## 2021-02-28 PROCEDURE — 85610 PROTHROMBIN TIME: CPT | Performed by: FAMILY MEDICINE

## 2021-02-28 PROCEDURE — 96376 TX/PRO/DX INJ SAME DRUG ADON: CPT

## 2021-02-28 PROCEDURE — 74177 CT ABD & PELVIS W/CONTRAST: CPT

## 2021-02-28 PROCEDURE — 25010000002 HYDROMORPHONE 1 MG/ML SOLUTION: Performed by: FAMILY MEDICINE

## 2021-02-28 PROCEDURE — 83690 ASSAY OF LIPASE: CPT | Performed by: FAMILY MEDICINE

## 2021-02-28 PROCEDURE — 25010000002 ONDANSETRON PER 1 MG: Performed by: FAMILY MEDICINE

## 2021-02-28 PROCEDURE — 93005 ELECTROCARDIOGRAM TRACING: CPT | Performed by: FAMILY MEDICINE

## 2021-02-28 PROCEDURE — 96375 TX/PRO/DX INJ NEW DRUG ADDON: CPT

## 2021-02-28 PROCEDURE — 81001 URINALYSIS AUTO W/SCOPE: CPT | Performed by: FAMILY MEDICINE

## 2021-02-28 PROCEDURE — 36415 COLL VENOUS BLD VENIPUNCTURE: CPT

## 2021-02-28 PROCEDURE — 96374 THER/PROPH/DIAG INJ IV PUSH: CPT

## 2021-02-28 PROCEDURE — 25010000002 HYDROMORPHONE PER 4 MG: Performed by: FAMILY MEDICINE

## 2021-02-28 PROCEDURE — 84484 ASSAY OF TROPONIN QUANT: CPT | Performed by: FAMILY MEDICINE

## 2021-02-28 PROCEDURE — 80053 COMPREHEN METABOLIC PANEL: CPT | Performed by: FAMILY MEDICINE

## 2021-02-28 RX ORDER — OXYCODONE HYDROCHLORIDE AND ACETAMINOPHEN 5; 325 MG/1; MG/1
1 TABLET ORAL ONCE
Status: COMPLETED | OUTPATIENT
Start: 2021-02-28 | End: 2021-02-28

## 2021-02-28 RX ORDER — HYDROMORPHONE HYDROCHLORIDE 1 MG/ML
0.5 INJECTION, SOLUTION INTRAMUSCULAR; INTRAVENOUS; SUBCUTANEOUS ONCE
Status: COMPLETED | OUTPATIENT
Start: 2021-02-28 | End: 2021-02-28

## 2021-02-28 RX ORDER — ONDANSETRON 4 MG/1
4 TABLET, ORALLY DISINTEGRATING ORAL EVERY 8 HOURS PRN
Qty: 10 TABLET | Refills: 0 | Status: SHIPPED | OUTPATIENT
Start: 2021-02-28 | End: 2021-08-29 | Stop reason: SDUPTHER

## 2021-02-28 RX ORDER — ONDANSETRON 2 MG/ML
4 INJECTION INTRAMUSCULAR; INTRAVENOUS ONCE
Status: COMPLETED | OUTPATIENT
Start: 2021-02-28 | End: 2021-02-28

## 2021-02-28 RX ADMIN — ONDANSETRON 4 MG: 2 INJECTION INTRAMUSCULAR; INTRAVENOUS at 00:10

## 2021-02-28 RX ADMIN — HYDROMORPHONE HYDROCHLORIDE 0.5 MG: 1 INJECTION, SOLUTION INTRAMUSCULAR; INTRAVENOUS; SUBCUTANEOUS at 00:07

## 2021-02-28 RX ADMIN — IOPAMIDOL 100 ML: 612 INJECTION, SOLUTION INTRAVENOUS at 02:26

## 2021-02-28 RX ADMIN — HYDROMORPHONE HYDROCHLORIDE 0.5 MG: 1 INJECTION, SOLUTION INTRAMUSCULAR; INTRAVENOUS; SUBCUTANEOUS at 02:14

## 2021-02-28 RX ADMIN — SODIUM CHLORIDE 1000 ML: 9 INJECTION, SOLUTION INTRAVENOUS at 00:56

## 2021-02-28 RX ADMIN — OXYCODONE HYDROCHLORIDE AND ACETAMINOPHEN 1 TABLET: 5; 325 TABLET ORAL at 04:22

## 2021-03-12 ENCOUNTER — HOSPITAL ENCOUNTER (EMERGENCY)
Facility: HOSPITAL | Age: 36
Discharge: HOME | End: 2021-03-13
Attending: EMERGENCY MEDICINE | Admitting: EMERGENCY MEDICINE

## 2021-03-12 DIAGNOSIS — N20.0 RENAL CALCULUS, LEFT: ICD-10-CM

## 2021-03-12 DIAGNOSIS — R10.32 LEFT LOWER QUADRANT PAIN: Primary | ICD-10-CM

## 2021-03-12 PROCEDURE — P9612 CATHETERIZE FOR URINE SPEC: HCPCS

## 2021-03-12 PROCEDURE — 99284 EMERGENCY DEPT VISIT MOD MDM: CPT

## 2021-03-12 RX ORDER — SODIUM CHLORIDE 9 MG/ML
125 INJECTION, SOLUTION INTRAVENOUS CONTINUOUS
Status: DISCONTINUED | OUTPATIENT
Start: 2021-03-12 | End: 2021-03-13 | Stop reason: HOSPADM

## 2021-03-12 RX ORDER — ONDANSETRON 2 MG/ML
4 INJECTION INTRAMUSCULAR; INTRAVENOUS ONCE
Status: COMPLETED | OUTPATIENT
Start: 2021-03-12 | End: 2021-03-13

## 2021-03-13 ENCOUNTER — APPOINTMENT (OUTPATIENT)
Dept: ULTRASOUND IMAGING | Facility: HOSPITAL | Age: 36
End: 2021-03-13

## 2021-03-13 ENCOUNTER — APPOINTMENT (OUTPATIENT)
Dept: CT IMAGING | Facility: HOSPITAL | Age: 36
End: 2021-03-13

## 2021-03-13 VITALS
WEIGHT: 290 LBS | HEART RATE: 101 BPM | SYSTOLIC BLOOD PRESSURE: 145 MMHG | RESPIRATION RATE: 18 BRPM | TEMPERATURE: 98.2 F | DIASTOLIC BLOOD PRESSURE: 89 MMHG | HEIGHT: 64 IN | OXYGEN SATURATION: 37 % | BODY MASS INDEX: 49.51 KG/M2

## 2021-03-13 LAB
ALBUMIN SERPL-MCNC: 4.01 G/DL (ref 3.5–5.2)
ALBUMIN/GLOB SERPL: 1 G/DL
ALP SERPL-CCNC: 89 U/L (ref 39–117)
ALT SERPL W P-5'-P-CCNC: 32 U/L (ref 1–33)
ANION GAP SERPL CALCULATED.3IONS-SCNC: 15.2 MMOL/L (ref 5–15)
AST SERPL-CCNC: 47 U/L (ref 1–32)
BASOPHILS # BLD AUTO: 0.03 10*3/MM3 (ref 0–0.2)
BASOPHILS NFR BLD AUTO: 0.6 % (ref 0–1.5)
BILIRUB SERPL-MCNC: 0.3 MG/DL (ref 0–1.2)
BILIRUB UR QL STRIP: NEGATIVE
BUN SERPL-MCNC: 9 MG/DL (ref 6–20)
BUN/CREAT SERPL: 12.3 (ref 7–25)
CALCIUM SPEC-SCNC: 9.6 MG/DL (ref 8.6–10.5)
CHLORIDE SERPL-SCNC: 97 MMOL/L (ref 98–107)
CLARITY UR: CLEAR
CO2 SERPL-SCNC: 20.8 MMOL/L (ref 22–29)
COLOR UR: YELLOW
CREAT SERPL-MCNC: 0.73 MG/DL (ref 0.57–1)
DEPRECATED RDW RBC AUTO: 45.8 FL (ref 37–54)
EOSINOPHIL # BLD AUTO: 0.13 10*3/MM3 (ref 0–0.4)
EOSINOPHIL NFR BLD AUTO: 2.6 % (ref 0.3–6.2)
ERYTHROCYTE [DISTWIDTH] IN BLOOD BY AUTOMATED COUNT: 14.8 % (ref 12.3–15.4)
GFR SERPL CREATININE-BSD FRML MDRD: 91 ML/MIN/1.73
GLOBULIN UR ELPH-MCNC: 4 GM/DL
GLUCOSE BLDC GLUCOMTR-MCNC: 396 MG/DL (ref 70–130)
GLUCOSE SERPL-MCNC: 443 MG/DL (ref 65–99)
GLUCOSE UR STRIP-MCNC: ABNORMAL MG/DL
HCG SERPL QL: NEGATIVE
HCT VFR BLD AUTO: 34.4 % (ref 34–46.6)
HGB BLD-MCNC: 11.9 G/DL (ref 12–15.9)
HGB UR QL STRIP.AUTO: NEGATIVE
HOLD SPECIMEN: NORMAL
IMM GRANULOCYTES # BLD AUTO: 0.04 10*3/MM3 (ref 0–0.05)
IMM GRANULOCYTES NFR BLD AUTO: 0.8 % (ref 0–0.5)
KETONES UR QL STRIP: ABNORMAL
LEUKOCYTE ESTERASE UR QL STRIP.AUTO: NEGATIVE
LIPASE SERPL-CCNC: 58 U/L (ref 13–60)
LYMPHOCYTES # BLD AUTO: 1.33 10*3/MM3 (ref 0.7–3.1)
LYMPHOCYTES NFR BLD AUTO: 26.3 % (ref 19.6–45.3)
MCH RBC QN AUTO: 29.8 PG (ref 26.6–33)
MCHC RBC AUTO-ENTMCNC: 34.6 G/DL (ref 31.5–35.7)
MCV RBC AUTO: 86 FL (ref 79–97)
MONOCYTES # BLD AUTO: 0.39 10*3/MM3 (ref 0.1–0.9)
MONOCYTES NFR BLD AUTO: 7.7 % (ref 5–12)
NEUTROPHILS NFR BLD AUTO: 3.13 10*3/MM3 (ref 1.7–7)
NEUTROPHILS NFR BLD AUTO: 62 % (ref 42.7–76)
NITRITE UR QL STRIP: NEGATIVE
NRBC BLD AUTO-RTO: 0 /100 WBC (ref 0–0.2)
PH UR STRIP.AUTO: <=5 [PH] (ref 5–8)
PLATELET # BLD AUTO: 241 10*3/MM3 (ref 140–450)
PMV BLD AUTO: 9.7 FL (ref 6–12)
POTASSIUM SERPL-SCNC: 5 MMOL/L (ref 3.5–5.2)
PROT SERPL-MCNC: 8 G/DL (ref 6–8.5)
PROT UR QL STRIP: NEGATIVE
RBC # BLD AUTO: 4 10*6/MM3 (ref 3.77–5.28)
SODIUM SERPL-SCNC: 133 MMOL/L (ref 136–145)
SP GR UR STRIP: 1.03 (ref 1–1.03)
UROBILINOGEN UR QL STRIP: ABNORMAL
WBC # BLD AUTO: 5.05 10*3/MM3 (ref 3.4–10.8)
WHOLE BLOOD HOLD SPECIMEN: NORMAL
WHOLE BLOOD HOLD SPECIMEN: NORMAL

## 2021-03-13 PROCEDURE — 83690 ASSAY OF LIPASE: CPT | Performed by: EMERGENCY MEDICINE

## 2021-03-13 PROCEDURE — 25010000002 ONDANSETRON PER 1 MG: Performed by: EMERGENCY MEDICINE

## 2021-03-13 PROCEDURE — 74177 CT ABD & PELVIS W/CONTRAST: CPT

## 2021-03-13 PROCEDURE — 25010000002 IOPAMIDOL 61 % SOLUTION: Performed by: EMERGENCY MEDICINE

## 2021-03-13 PROCEDURE — 96374 THER/PROPH/DIAG INJ IV PUSH: CPT

## 2021-03-13 PROCEDURE — 81003 URINALYSIS AUTO W/O SCOPE: CPT | Performed by: EMERGENCY MEDICINE

## 2021-03-13 PROCEDURE — 76856 US EXAM PELVIC COMPLETE: CPT

## 2021-03-13 PROCEDURE — 96375 TX/PRO/DX INJ NEW DRUG ADDON: CPT

## 2021-03-13 PROCEDURE — 63710000001 PROMETHAZINE PER 25 MG: Performed by: EMERGENCY MEDICINE

## 2021-03-13 PROCEDURE — 85025 COMPLETE CBC W/AUTO DIFF WBC: CPT | Performed by: EMERGENCY MEDICINE

## 2021-03-13 PROCEDURE — 63710000001 INSULIN REGULAR HUMAN PER 5 UNITS: Performed by: EMERGENCY MEDICINE

## 2021-03-13 PROCEDURE — 96376 TX/PRO/DX INJ SAME DRUG ADON: CPT

## 2021-03-13 PROCEDURE — 82962 GLUCOSE BLOOD TEST: CPT

## 2021-03-13 PROCEDURE — 84703 CHORIONIC GONADOTROPIN ASSAY: CPT | Performed by: EMERGENCY MEDICINE

## 2021-03-13 PROCEDURE — 36415 COLL VENOUS BLD VENIPUNCTURE: CPT

## 2021-03-13 PROCEDURE — 80053 COMPREHEN METABOLIC PANEL: CPT | Performed by: EMERGENCY MEDICINE

## 2021-03-13 PROCEDURE — P9612 CATHETERIZE FOR URINE SPEC: HCPCS

## 2021-03-13 PROCEDURE — 25010000002 HYDROMORPHONE 1 MG/ML SOLUTION: Performed by: EMERGENCY MEDICINE

## 2021-03-13 PROCEDURE — 96361 HYDRATE IV INFUSION ADD-ON: CPT

## 2021-03-13 RX ORDER — PROMETHAZINE HYDROCHLORIDE 25 MG/1
25 TABLET ORAL EVERY 6 HOURS PRN
Status: COMPLETED | OUTPATIENT
Start: 2021-03-13 | End: 2021-03-13

## 2021-03-13 RX ORDER — PROMETHAZINE HYDROCHLORIDE 25 MG/1
25 TABLET ORAL EVERY 6 HOURS PRN
Qty: 15 TABLET | Refills: 0 | Status: ON HOLD | OUTPATIENT
Start: 2021-03-13 | End: 2021-10-01

## 2021-03-13 RX ORDER — HYDROCODONE BITARTRATE AND ACETAMINOPHEN 10; 325 MG/1; MG/1
1 TABLET ORAL EVERY 6 HOURS PRN
Qty: 10 TABLET | Refills: 0 | Status: SHIPPED | OUTPATIENT
Start: 2021-03-13 | End: 2021-03-19 | Stop reason: SDUPTHER

## 2021-03-13 RX ORDER — HYDROCODONE BITARTRATE AND ACETAMINOPHEN 10; 325 MG/1; MG/1
1 TABLET ORAL EVERY 6 HOURS PRN
Status: COMPLETED | OUTPATIENT
Start: 2021-03-13 | End: 2021-03-13

## 2021-03-13 RX ADMIN — HUMAN INSULIN 5 UNITS: 100 INJECTION, SOLUTION SUBCUTANEOUS at 01:45

## 2021-03-13 RX ADMIN — HUMAN INSULIN 8 UNITS: 100 INJECTION, SOLUTION SUBCUTANEOUS at 03:40

## 2021-03-13 RX ADMIN — HYDROCODONE BITARTRATE AND ACETAMINOPHEN 1 TABLET: 10; 325 TABLET ORAL at 03:45

## 2021-03-13 RX ADMIN — PROMETHAZINE HYDROCHLORIDE 25 MG: 25 TABLET ORAL at 03:45

## 2021-03-13 RX ADMIN — HYDROMORPHONE HYDROCHLORIDE 1 MG: 1 INJECTION, SOLUTION INTRAMUSCULAR; INTRAVENOUS; SUBCUTANEOUS at 01:47

## 2021-03-13 RX ADMIN — IOPAMIDOL 85 ML: 612 INJECTION, SOLUTION INTRAVENOUS at 01:05

## 2021-03-13 RX ADMIN — SODIUM CHLORIDE 125 ML/HR: 9 INJECTION, SOLUTION INTRAVENOUS at 00:07

## 2021-03-13 RX ADMIN — ONDANSETRON 4 MG: 2 INJECTION INTRAMUSCULAR; INTRAVENOUS at 00:07

## 2021-03-13 RX ADMIN — SODIUM CHLORIDE 1000 ML: 9 INJECTION, SOLUTION INTRAVENOUS at 00:07

## 2021-03-13 RX ADMIN — HYDROMORPHONE HYDROCHLORIDE 1 MG: 1 INJECTION, SOLUTION INTRAMUSCULAR; INTRAVENOUS; SUBCUTANEOUS at 03:25

## 2021-03-13 RX ADMIN — HYDROMORPHONE HYDROCHLORIDE 1 MG: 1 INJECTION, SOLUTION INTRAMUSCULAR; INTRAVENOUS; SUBCUTANEOUS at 00:07

## 2021-03-13 NOTE — ED PROVIDER NOTES
Subjective   Patient is 35-year-old female with a history of ovarian cancer, had her right ovary removed at [facility] approximately 1 month ago.  She now presents with a 2-day history of severe left lower quadrant abdominal pain associated with nausea and vomiting.  No fever, chills, chest pain, shortness of breath, hematemesis, dysuria, hematuria, hematochezia or melena, syncope or near syncope, focal numbness or weakness, other symptoms or other complaints.  She does have a history of hypertension, reports that she takes metoprolol 25 mg once a day and hydrochlorothiazide in an unknown dosage for this.  She states that she has not missed any doses.          Review of Systems   Constitutional: Negative for chills, diaphoresis and fever.   HENT: Negative for ear pain, sore throat and trouble swallowing.    Eyes: Negative for photophobia and pain.   Respiratory: Negative for shortness of breath, wheezing and stridor.    Cardiovascular: Negative for chest pain and palpitations.   Gastrointestinal: Positive for abdominal pain, nausea and vomiting. Negative for abdominal distention, blood in stool and diarrhea.   Endocrine: Negative for polydipsia and polyphagia.   Genitourinary: Negative for dysuria and hematuria.   Musculoskeletal: Negative for back pain, neck pain and neck stiffness.   Skin: Negative for color change and pallor.   Neurological: Negative for seizures, syncope and speech difficulty.   Psychiatric/Behavioral: Negative for confusion.   All other systems reviewed and are negative.      Past Medical History:   Diagnosis Date   • Abnormal ECG    • Anemia    • Anxiety    • Asthma    • Depression    • Diabetes mellitus (CMS/HCC)    • DVT (deep venous thrombosis) (CMS/[facility])    • Factor 5 Leiden mutation, heterozygous (CMS/HCC)    • Fibroid    • GERD (gastroesophageal reflux disease)    • Gestational diabetes    • Gout    • Hyperlipidemia    • Hypothyroid    • Kidney stone    • Migraines    • Neuropathy   "  • Ovarian cyst    • PE (pulmonary embolism)    • Polycystic ovary syndrome    • Preeclampsia    • Rh incompatibility    • Urinary tract infection    • Varicella        Allergies   Allergen Reactions   • Amoxicillin Hives   • Haldol [Haloperidol] Hives   • Penicillins Hives   • Toradol [Ketorolac Tromethamine] Hives   • Tramadol Swelling       Past Surgical History:   Procedure Laterality Date   • CARDIAC CATHETERIZATION     •  SECTION     • CHOLECYSTECTOMY     • COLONOSCOPY         Family History   Problem Relation Age of Onset   • No Known Problems Mother    • No Known Problems Father    • No Known Problems Sister    • No Known Problems Brother    • No Known Problems Son    • No Known Problems Daughter    • No Known Problems Maternal Grandmother    • No Known Problems Maternal Grandfather    • No Known Problems Paternal Grandmother    • No Known Problems Paternal Grandfather    • No Known Problems Cousin    • Rheum arthritis Neg Hx    • Osteoarthritis Neg Hx    • Asthma Neg Hx    • Diabetes Neg Hx    • Heart failure Neg Hx    • Hyperlipidemia Neg Hx    • Hypertension Neg Hx    • Migraines Neg Hx    • Rashes / Skin problems Neg Hx    • Seizures Neg Hx    • Stroke Neg Hx    • Thyroid disease Neg Hx        Social History     Socioeconomic History   • Marital status:      Spouse name: Not on file   • Number of children: Not on file   • Years of education: Not on file   • Highest education level: Not on file   Tobacco Use   • Smoking status: Never Smoker   • Smokeless tobacco: Never Used   Substance and Sexual Activity   • Alcohol use: No   • Drug use: Never     Comment: Pt has track marks on right arm. Pt states \"It's from my INR being drawn.\"    • Sexual activity: Defer           Objective   Physical Exam  Vitals and nursing note reviewed.   Constitutional:       Appearance: She is well-developed. She is not toxic-appearing or diaphoretic.      Comments: Obese young female, pleasant and cooperative, " appears uncomfortable.   HENT:      Head: Normocephalic and atraumatic.   Eyes:      General: No scleral icterus.     Pupils: Pupils are equal, round, and reactive to light.   Neck:      Trachea: No tracheal deviation.   Cardiovascular:      Rate and Rhythm: Normal rate and regular rhythm.   Pulmonary:      Effort: Pulmonary effort is normal. No respiratory distress.      Breath sounds: Normal breath sounds.   Chest:      Chest wall: No tenderness.   Abdominal:      General: Bowel sounds are normal.      Palpations: Abdomen is soft.      Tenderness: There is abdominal tenderness (Tenderness suprapubically, left lower abdomen, left flank.  No acute peritoneal signs.). There is left CVA tenderness (Mild left CVAT). There is no right CVA tenderness, guarding or rebound.   Musculoskeletal:         General: No tenderness. Normal range of motion.      Cervical back: Normal range of motion and neck supple. No rigidity.      Right lower leg: No edema.      Left lower leg: No edema.   Skin:     General: Skin is warm and dry.      Capillary Refill: Capillary refill takes less than 2 seconds.      Coloration: Skin is not pale.   Neurological:      General: No focal deficit present.      Mental Status: She is alert and oriented to person, place, and time.      GCS: GCS eye subscore is 4. GCS verbal subscore is 5. GCS motor subscore is 6.      Sensory: No sensory deficit.      Motor: No abnormal muscle tone.      Coordination: Coordination normal.   Psychiatric:         Behavior: Behavior normal.         Procedures  US Pelvis Complete   Final Result      CT Abdomen Pelvis With Contrast   Final Result   Marked hepatomegaly. Today's study clearly shows scattered areas of decreased density in the far left lobe of the liver which are nonspecific. They are certainly not typical of metastatic disease      The patient has had ovarian mass removed. These densities were not behind site visible 2 weeks ago but not visible on older studies  from a few months ago. They may represent areas of focal fatty infiltration. The entire liver is somewhat fatty   infiltrated. Clinical correlation would likely a metastatic disease is recommended and if necessary an MRI liver could be performed.      Postoperative changes in the region of the umbilicus with a small 1 cm fluid and air collection in this region is stable from 2/28/2021. This is surrounded by what appears be postoperative change in the abdominal wall.      No cause for the left-sided abdominal pain is identified. There is a nonobstructing left renal stone.               Signer Name: Gerardo Jorgensen MD    Signed: 3/13/2021 1:32 AM    Workstation Name: [id number]     [facility]        Results for orders placed or performed during the hospital encounter of 03/12/21   Comprehensive Metabolic Panel    Specimen: Hand, Right; Blood   Result Value Ref Range    Glucose 443 (C) 65 - 99 mg/dL    BUN 9 6 - 20 mg/dL    Creatinine 0.73 0.57 - 1.00 mg/dL    Sodium 133 (L) 136 - 145 mmol/L    Potassium 5.0 3.5 - 5.2 mmol/L    Chloride 97 (L) 98 - 107 mmol/L    CO2 20.8 (L) 22.0 - 29.0 mmol/L    Calcium 9.6 8.6 - 10.5 mg/dL    Total Protein 8.0 6.0 - 8.5 g/dL    Albumin 4.01 3.50 - 5.20 g/dL    ALT (SGPT) 32 1 - 33 U/L    AST (SGOT) 47 (H) 1 - 32 U/L    Alkaline Phosphatase 89 39 - 117 U/L    Total Bilirubin 0.3 0.0 - 1.2 mg/dL    eGFR Non African Amer 91 >60 mL/min/1.73    Globulin 4.0 gm/dL    A/G Ratio 1.0 g/dL    BUN/Creatinine Ratio 12.3 7.0 - 25.0    Anion Gap 15.2 (H) 5.0 - 15.0 mmol/L   Lipase    Specimen: Hand, Right; Blood   Result Value Ref Range    Lipase 58 13 - 60 U/L   hCG, Serum, Qualitative    Specimen: Hand, Right; Blood   Result Value Ref Range    HCG Qualitative Negative Negative   Urinalysis With Culture If Indicated - Urine, Catheter In/Out    Specimen: Urine, Catheter In/Out   Result Value Ref Range    Color, UA Yellow Yellow, Straw    Appearance, UA Clear Clear    pH, UA  <=5.0 5.0 - 8.0    Specific Gravity, UA 1.028 1.005 - 1.030    Glucose, UA >=1000 mg/dL (3+) (A) Negative    Ketones, UA Trace (A) Negative    Bilirubin, UA Negative Negative    Blood, UA Negative Negative    Protein, UA Negative Negative    Leuk Esterase, UA Negative Negative    Nitrite, UA Negative Negative    Urobilinogen, UA 0.2 E.U./dL 0.2 - 1.0 E.U./dL   CBC Auto Differential    Specimen: Hand, Right; Blood   Result Value Ref Range    WBC 5.05 3.40 - 10.80 10*3/mm3    RBC 4.00 3.77 - 5.28 10*6/mm3    Hemoglobin 11.9 (L) 12.0 - 15.9 g/dL    Hematocrit 34.4 34.0 - 46.6 %    MCV 86.0 79.0 - 97.0 fL    MCH 29.8 26.6 - 33.0 pg    MCHC 34.6 31.5 - 35.7 g/dL    RDW 14.8 12.3 - 15.4 %    RDW-SD 45.8 37.0 - 54.0 fl    MPV 9.7 6.0 - 12.0 fL    Platelets 241 140 - 450 10*3/mm3    Neutrophil % 62.0 42.7 - 76.0 %    Lymphocyte % 26.3 19.6 - 45.3 %    Monocyte % 7.7 5.0 - 12.0 %    Eosinophil % 2.6 0.3 - 6.2 %    Basophil % 0.6 0.0 - 1.5 %    Immature Grans % 0.8 (H) 0.0 - 0.5 %    Neutrophils, Absolute 3.13 1.70 - 7.00 10*3/mm3    Lymphocytes, Absolute 1.33 0.70 - 3.10 10*3/mm3    Monocytes, Absolute 0.39 0.10 - 0.90 10*3/mm3    Eosinophils, Absolute 0.13 0.00 - 0.40 10*3/mm3    Basophils, Absolute 0.03 0.00 - 0.20 10*3/mm3    Immature Grans, Absolute 0.04 0.00 - 0.05 10*3/mm3    nRBC 0.0 0.0 - 0.2 /100 WBC   Light Blue Top   Result Value Ref Range    Extra Tube hold for add-on    Green Top (Gel)   Result Value Ref Range    Extra Tube Hold for add-ons.    Lavender Top   Result Value Ref Range    Extra Tube hold for add-on                 ED Course  ED Course as of Mar 13 0331   Sat Mar 13, 2021   0127 Automated blood pressure cuff readings have been erroneously high, felt to be due to the patient's body habitus.  Manual blood pressure moments ago 165/100.    [CM]   0315 Patient reports increased pain after the transvaginal ultrasound.    [CM]   0329 Patient is doing well.  We discussed her test results and her plan of  care.  She voices understanding and agreement.  Fingerstick blood sugar now 396.  Will give additional insulin prior to her departure.    [CM]      ED Course User Index  [CM] Esvin Narayanan MD                                           [facility]    Final diagnoses:   Left lower quadrant pain             Please note that portions of this note were completed with a voice recognition program.        Esvin Narayanan MD  03/13/21 7491

## 2021-03-13 NOTE — DISCHARGE INSTRUCTIONS
Home to rest.  Drink plenty of fluids.  Take your medication as prescribed.  See your family doctor early next week.  Return the emergency department right away if symptoms worsen/any problems.      Call one of the offices below to establish a primary care provider.  If you are unable to get an appointment and feel it is an emergency and need to be seen immediately please return to the Emergency Department.    Call one of the office below to set up a primary care provider.    Dr. Dharmesh Chao                                                                                                       [address] [facility] [address]  [phone]    Dr. Souza, Dr. ISHA Holden, Dr. ROSE MARIE Holden ([facility])  [address] [facility] [address]  [phone]    Dr. Henning, Dr. Larsen, Dr. Toro ([facility])  [address] [facility] [address]  [phone]    Dr. Gutierrez  [address] [facility] [address]  [phone]    Dr. Torres, Dr. Pena, Dr. Roberson, Dr. Garces ([facility])  [address] [place] [address] [address]  [facility] [address]  [phone]    Dr. Vanessa Gaytan  [address] [facility] [address]  [phone]    Dr. Constance Ferraro  [address]   [address]  [facility] [address]  [phone]    Dr. Chao  [address] [facility] [address]  [phone]    Dr. Castanon, Dr. Cifuentes  [address] [facility] [address]  [phone]    Dr. Lopez  [id number]                                                              [address]  [facility] [address]  [phone]    Dr. Fisher  [address] [facility] [address]  [phone]    Dr. Stephanie Teixeira  [address] [facility]  [address]  [facility] [address]  [phone]    Dr. Edmonds and [facility] [address]   [address] [facility]  [address]  [place] [address]  [phone]

## 2021-03-15 ENCOUNTER — BULK ORDERING (OUTPATIENT)
Dept: CASE MANAGEMENT | Facility: OTHER | Age: 36
End: 2021-03-15

## 2021-03-15 ENCOUNTER — APPOINTMENT (OUTPATIENT)
Dept: GENERAL RADIOLOGY | Facility: HOSPITAL | Age: 36
End: 2021-03-15

## 2021-03-15 ENCOUNTER — HOSPITAL ENCOUNTER (EMERGENCY)
Facility: HOSPITAL | Age: 36
Discharge: HOME | End: 2021-03-15
Attending: FAMILY MEDICINE | Admitting: EMERGENCY MEDICINE

## 2021-03-15 ENCOUNTER — APPOINTMENT (OUTPATIENT)
Dept: CT IMAGING | Facility: HOSPITAL | Age: 36
End: 2021-03-15

## 2021-03-15 VITALS
SYSTOLIC BLOOD PRESSURE: 142 MMHG | HEIGHT: 64 IN | OXYGEN SATURATION: 96 % | RESPIRATION RATE: 18 BRPM | BODY MASS INDEX: 49.51 KG/M2 | WEIGHT: 290 LBS | DIASTOLIC BLOOD PRESSURE: 88 MMHG | HEART RATE: 90 BPM | TEMPERATURE: 98 F

## 2021-03-15 DIAGNOSIS — C56.9 MALIGNANT NEOPLASM OF OVARY, UNSPECIFIED LATERALITY (HCC): ICD-10-CM

## 2021-03-15 DIAGNOSIS — IMO0002 INSULIN DEPENDENT TYPE 2 DIABETES MELLITUS, UNCONTROLLED: ICD-10-CM

## 2021-03-15 DIAGNOSIS — R79.89 ELEVATED LACTIC ACID LEVEL: ICD-10-CM

## 2021-03-15 DIAGNOSIS — R10.9 FLANK PAIN: Primary | ICD-10-CM

## 2021-03-15 DIAGNOSIS — K76.9 LIVER LESION: ICD-10-CM

## 2021-03-15 DIAGNOSIS — I10 ESSENTIAL HYPERTENSION: ICD-10-CM

## 2021-03-15 DIAGNOSIS — Z23 IMMUNIZATION DUE: ICD-10-CM

## 2021-03-15 LAB
ACETONE BLD QL: NEGATIVE
ALBUMIN SERPL-MCNC: 3.9 G/DL (ref 3.5–5.2)
ALBUMIN/GLOB SERPL: 1 G/DL
ALP SERPL-CCNC: 97 U/L (ref 39–117)
ALT SERPL W P-5'-P-CCNC: 28 U/L (ref 1–33)
ANION GAP SERPL CALCULATED.3IONS-SCNC: 19.7 MMOL/L (ref 5–15)
AST SERPL-CCNC: 41 U/L (ref 1–32)
ATMOSPHERIC PRESS: 730 MMHG
BASE EXCESS BLDV CALC-SCNC: -1 MMOL/L (ref 0–2)
BASOPHILS # BLD AUTO: 0.04 10*3/MM3 (ref 0–0.2)
BASOPHILS NFR BLD AUTO: 0.7 % (ref 0–1.5)
BDY SITE: ABNORMAL
BILIRUB SERPL-MCNC: 0.2 MG/DL (ref 0–1.2)
BILIRUB UR QL STRIP: NEGATIVE
BODY TEMPERATURE: 37 C
BUN SERPL-MCNC: 10 MG/DL (ref 6–20)
BUN/CREAT SERPL: 16.4 (ref 7–25)
CALCIUM SPEC-SCNC: 9.7 MG/DL (ref 8.6–10.5)
CHLORIDE SERPL-SCNC: 93 MMOL/L (ref 98–107)
CLARITY UR: CLEAR
CO2 BLDA-SCNC: 23.6 MMOL/L (ref 22–33)
CO2 SERPL-SCNC: 18.3 MMOL/L (ref 22–29)
COHGB MFR BLD: 1.5 % (ref 0–5)
COLOR UR: YELLOW
CREAT SERPL-MCNC: 0.61 MG/DL (ref 0.57–1)
CRP SERPL-MCNC: 2.89 MG/DL (ref 0–0.5)
D-LACTATE SERPL-SCNC: 4.2 MMOL/L (ref 0.5–2)
D-LACTATE SERPL-SCNC: 4.9 MMOL/L (ref 0.5–2)
DEPRECATED RDW RBC AUTO: 47.3 FL (ref 37–54)
EOSINOPHIL # BLD AUTO: 0.11 10*3/MM3 (ref 0–0.4)
EOSINOPHIL NFR BLD AUTO: 2 % (ref 0.3–6.2)
ERYTHROCYTE [DISTWIDTH] IN BLOOD BY AUTOMATED COUNT: 15 % (ref 12.3–15.4)
FLUAV RNA RESP QL NAA+PROBE: NOT DETECTED
FLUBV RNA RESP QL NAA+PROBE: NOT DETECTED
GFR SERPL CREATININE-BSD FRML MDRD: 112 ML/MIN/1.73
GLOBULIN UR ELPH-MCNC: 4.1 GM/DL
GLUCOSE BLDC GLUCOMTR-MCNC: 436 MG/DL (ref 70–130)
GLUCOSE BLDC GLUCOMTR-MCNC: 445 MG/DL (ref 70–130)
GLUCOSE SERPL-MCNC: 461 MG/DL (ref 65–99)
GLUCOSE UR STRIP-MCNC: ABNORMAL MG/DL
HCO3 BLDV-SCNC: 22.6 MMOL/L (ref 22–28)
HCT VFR BLD AUTO: 35.3 % (ref 34–46.6)
HGB BLD-MCNC: 12.2 G/DL (ref 12–15.9)
HGB BLDA-MCNC: 12.3 G/DL (ref 13.5–17.5)
HGB UR QL STRIP.AUTO: NEGATIVE
HOLD SPECIMEN: NORMAL
HOLD SPECIMEN: NORMAL
IMM GRANULOCYTES # BLD AUTO: 0.04 10*3/MM3 (ref 0–0.05)
IMM GRANULOCYTES NFR BLD AUTO: 0.7 % (ref 0–0.5)
INHALED O2 CONCENTRATION: 21 %
INR PPP: 0.87 (ref 0.9–1.1)
KETONES UR QL STRIP: NEGATIVE
LEUKOCYTE ESTERASE UR QL STRIP.AUTO: NEGATIVE
LIPASE SERPL-CCNC: 57 U/L (ref 13–60)
LYMPHOCYTES # BLD AUTO: 1.42 10*3/MM3 (ref 0.7–3.1)
LYMPHOCYTES NFR BLD AUTO: 25.6 % (ref 19.6–45.3)
Lab: ABNORMAL
MAGNESIUM SERPL-MCNC: 1.6 MG/DL (ref 1.6–2.6)
MCH RBC QN AUTO: 30.3 PG (ref 26.6–33)
MCHC RBC AUTO-ENTMCNC: 34.6 G/DL (ref 31.5–35.7)
MCV RBC AUTO: 87.8 FL (ref 79–97)
METHGB BLD QL: 0.5 % (ref 0–3)
MODALITY: ABNORMAL
MONOCYTES # BLD AUTO: 0.4 10*3/MM3 (ref 0.1–0.9)
MONOCYTES NFR BLD AUTO: 7.2 % (ref 5–12)
NEUTROPHILS NFR BLD AUTO: 3.54 10*3/MM3 (ref 1.7–7)
NEUTROPHILS NFR BLD AUTO: 63.8 % (ref 42.7–76)
NITRITE UR QL STRIP: NEGATIVE
NRBC BLD AUTO-RTO: 0 /100 WBC (ref 0–0.2)
NT-PROBNP SERPL-MCNC: 6 PG/ML (ref 0–450)
OXYHGB MFR BLDV: 95.5 % (ref 45–75)
PCO2 BLDV: 33.1 MM HG (ref 41–51)
PH BLDV: 7.44 PH UNITS (ref 7.32–7.42)
PH UR STRIP.AUTO: <=5 [PH] (ref 5–8)
PLATELET # BLD AUTO: 292 10*3/MM3 (ref 140–450)
PMV BLD AUTO: 9.2 FL (ref 6–12)
PO2 BLDV: 86.2 MM HG (ref 27–53)
POTASSIUM SERPL-SCNC: 4.3 MMOL/L (ref 3.5–5.2)
PROT SERPL-MCNC: 8 G/DL (ref 6–8.5)
PROT UR QL STRIP: NEGATIVE
PROTHROMBIN TIME: 11.7 SECONDS (ref 11.9–14.1)
RBC # BLD AUTO: 4.02 10*6/MM3 (ref 3.77–5.28)
SARS-COV-2 RNA RESP QL NAA+PROBE: NOT DETECTED
SODIUM SERPL-SCNC: 131 MMOL/L (ref 136–145)
SP GR UR STRIP: 1.03 (ref 1–1.03)
TROPONIN T SERPL-MCNC: <0.01 NG/ML (ref 0–0.03)
UROBILINOGEN UR QL STRIP: ABNORMAL
VENTILATOR MODE: ABNORMAL
WBC # BLD AUTO: 5.55 10*3/MM3 (ref 3.4–10.8)
WHOLE BLOOD HOLD SPECIMEN: NORMAL
WHOLE BLOOD HOLD SPECIMEN: NORMAL

## 2021-03-15 PROCEDURE — 82962 GLUCOSE BLOOD TEST: CPT

## 2021-03-15 PROCEDURE — 82805 BLOOD GASES W/O2 SATURATION: CPT

## 2021-03-15 PROCEDURE — 71275 CT ANGIOGRAPHY CHEST: CPT

## 2021-03-15 PROCEDURE — 83880 ASSAY OF NATRIURETIC PEPTIDE: CPT | Performed by: FAMILY MEDICINE

## 2021-03-15 PROCEDURE — 0 IOPAMIDOL PER 1 ML: Performed by: FAMILY MEDICINE

## 2021-03-15 PROCEDURE — 99285 EMERGENCY DEPT VISIT HI MDM: CPT

## 2021-03-15 PROCEDURE — 96376 TX/PRO/DX INJ SAME DRUG ADON: CPT

## 2021-03-15 PROCEDURE — 85025 COMPLETE CBC W/AUTO DIFF WBC: CPT | Performed by: FAMILY MEDICINE

## 2021-03-15 PROCEDURE — 25010000002 HYDROMORPHONE 1 MG/ML SOLUTION: Performed by: FAMILY MEDICINE

## 2021-03-15 PROCEDURE — 96374 THER/PROPH/DIAG INJ IV PUSH: CPT

## 2021-03-15 PROCEDURE — 86140 C-REACTIVE PROTEIN: CPT | Performed by: FAMILY MEDICINE

## 2021-03-15 PROCEDURE — 84484 ASSAY OF TROPONIN QUANT: CPT | Performed by: FAMILY MEDICINE

## 2021-03-15 PROCEDURE — 93010 ELECTROCARDIOGRAM REPORT: CPT | Performed by: INTERNAL MEDICINE

## 2021-03-15 PROCEDURE — 80053 COMPREHEN METABOLIC PANEL: CPT | Performed by: FAMILY MEDICINE

## 2021-03-15 PROCEDURE — 87636 SARSCOV2 & INF A&B AMP PRB: CPT | Performed by: FAMILY MEDICINE

## 2021-03-15 PROCEDURE — 83690 ASSAY OF LIPASE: CPT | Performed by: FAMILY MEDICINE

## 2021-03-15 PROCEDURE — 83605 ASSAY OF LACTIC ACID: CPT | Performed by: FAMILY MEDICINE

## 2021-03-15 PROCEDURE — 63710000001 INSULIN REGULAR HUMAN PER 5 UNITS: Performed by: FAMILY MEDICINE

## 2021-03-15 PROCEDURE — 74177 CT ABD & PELVIS W/CONTRAST: CPT

## 2021-03-15 PROCEDURE — 71045 X-RAY EXAM CHEST 1 VIEW: CPT

## 2021-03-15 PROCEDURE — 81003 URINALYSIS AUTO W/O SCOPE: CPT | Performed by: FAMILY MEDICINE

## 2021-03-15 PROCEDURE — 63710000001 INSULIN REGULAR HUMAN PER 5 UNITS: Performed by: EMERGENCY MEDICINE

## 2021-03-15 PROCEDURE — 82009 KETONE BODYS QUAL: CPT | Performed by: FAMILY MEDICINE

## 2021-03-15 PROCEDURE — 82820 HEMOGLOBIN-OXYGEN AFFINITY: CPT

## 2021-03-15 PROCEDURE — 83735 ASSAY OF MAGNESIUM: CPT | Performed by: FAMILY MEDICINE

## 2021-03-15 PROCEDURE — 96375 TX/PRO/DX INJ NEW DRUG ADDON: CPT

## 2021-03-15 PROCEDURE — 25010000002 HYDRALAZINE PER 20 MG: Performed by: FAMILY MEDICINE

## 2021-03-15 PROCEDURE — 93005 ELECTROCARDIOGRAM TRACING: CPT | Performed by: FAMILY MEDICINE

## 2021-03-15 PROCEDURE — 25010000002 HYDROMORPHONE PER 4 MG: Performed by: EMERGENCY MEDICINE

## 2021-03-15 PROCEDURE — 85610 PROTHROMBIN TIME: CPT | Performed by: FAMILY MEDICINE

## 2021-03-15 PROCEDURE — 87040 BLOOD CULTURE FOR BACTERIA: CPT | Performed by: FAMILY MEDICINE

## 2021-03-15 RX ORDER — MULTIVIT WITH MINERALS/LUTEIN
250 TABLET ORAL DAILY
COMMUNITY
End: 2022-02-18

## 2021-03-15 RX ORDER — METOPROLOL TARTRATE 5 MG/5ML
5 INJECTION INTRAVENOUS ONCE
Status: COMPLETED | OUTPATIENT
Start: 2021-03-15 | End: 2021-03-15

## 2021-03-15 RX ORDER — HYDROMORPHONE HYDROCHLORIDE 1 MG/ML
0.5 INJECTION, SOLUTION INTRAMUSCULAR; INTRAVENOUS; SUBCUTANEOUS ONCE
Status: COMPLETED | OUTPATIENT
Start: 2021-03-15 | End: 2021-03-15

## 2021-03-15 RX ORDER — FOLIC ACID 1 MG/1
1 TABLET ORAL DAILY
COMMUNITY
End: 2022-02-18

## 2021-03-15 RX ORDER — SODIUM CHLORIDE 0.9 % (FLUSH) 0.9 %
10 SYRINGE (ML) INJECTION AS NEEDED
Status: DISCONTINUED | OUTPATIENT
Start: 2021-03-15 | End: 2021-03-15 | Stop reason: HOSPADM

## 2021-03-15 RX ORDER — UBIDECARENONE 75 MG
50 CAPSULE ORAL DAILY
COMMUNITY
End: 2022-02-18

## 2021-03-15 RX ORDER — CHLORAL HYDRATE 500 MG
CAPSULE ORAL
COMMUNITY
End: 2022-03-14

## 2021-03-15 RX ORDER — HYDRALAZINE HYDROCHLORIDE 20 MG/ML
10 INJECTION INTRAMUSCULAR; INTRAVENOUS ONCE
Status: COMPLETED | OUTPATIENT
Start: 2021-03-15 | End: 2021-03-15

## 2021-03-15 RX ORDER — HYDROCHLOROTHIAZIDE 25 MG/1
25 TABLET ORAL DAILY
COMMUNITY
End: 2022-02-18

## 2021-03-15 RX ADMIN — HYDROMORPHONE HYDROCHLORIDE 0.5 MG: 1 INJECTION, SOLUTION INTRAMUSCULAR; INTRAVENOUS; SUBCUTANEOUS at 04:36

## 2021-03-15 RX ADMIN — HUMAN INSULIN 10 UNITS: 100 INJECTION, SOLUTION SUBCUTANEOUS at 08:44

## 2021-03-15 RX ADMIN — HYDRALAZINE HYDROCHLORIDE 10 MG: 20 INJECTION INTRAMUSCULAR; INTRAVENOUS at 06:53

## 2021-03-15 RX ADMIN — IOPAMIDOL 91 ML: 755 INJECTION, SOLUTION INTRAVENOUS at 05:18

## 2021-03-15 RX ADMIN — HUMAN INSULIN 5 UNITS: 100 INJECTION, SOLUTION SUBCUTANEOUS at 07:10

## 2021-03-15 RX ADMIN — HYDRALAZINE HYDROCHLORIDE 10 MG: 20 INJECTION INTRAMUSCULAR; INTRAVENOUS at 04:02

## 2021-03-15 RX ADMIN — METOPROLOL TARTRATE 5 MG: 5 INJECTION, SOLUTION INTRAVENOUS at 06:28

## 2021-03-15 RX ADMIN — SODIUM CHLORIDE 1000 ML: 9 INJECTION, SOLUTION INTRAVENOUS at 05:46

## 2021-03-15 RX ADMIN — HYDROMORPHONE HYDROCHLORIDE 0.5 MG: 1 INJECTION, SOLUTION INTRAMUSCULAR; INTRAVENOUS; SUBCUTANEOUS at 06:17

## 2021-03-15 RX ADMIN — HYDROMORPHONE HYDROCHLORIDE 0.5 MG: 1 INJECTION, SOLUTION INTRAMUSCULAR; INTRAVENOUS; SUBCUTANEOUS at 07:29

## 2021-03-15 RX ADMIN — HUMAN INSULIN 5 UNITS: 100 INJECTION, SOLUTION SUBCUTANEOUS at 05:47

## 2021-03-15 NOTE — ED PROVIDER NOTES
Subjective   35-year-old female with history of DVT factor V Leiden mutation diabetes hypertension ovarian cancer presents the emergency room with complaints of flank pain.  Patient was seen in the emergency room yesterday for flank pain she had a CT scan of her abdomen and pelvis as well as an ultrasound of her pelvis that essentially was unremarkable from previous imaging then performed the last week.  Patient reports since being discharged home she continued to have flank pain she states she has had shortness of breath develop over the past day.  She states has had a cough is nonproductive.  She states pain in her flank is getting worse.  She does report she has a appointment with oncology on Thursday in [place].  She states she has followed up with her surgeon in [place] for postop follow-up after her surgery for an ovarian mass removal.  Due to symptoms patient came emergency room for evaluation.  She denies fever chills.  She denies headache chest pain.      Shortness of Breath  Severity:  Moderate  Timing:  Constant  Chronicity:  New  Relieved by:  Nothing  Worsened by:  Nothing  Associated symptoms: abdominal pain and cough    Associated symptoms: no chest pain, no claudication, no fever, no sore throat, no sputum production, no vomiting and no wheezing        Review of Systems   Constitutional: Negative for fever.   HENT: Negative for sore throat.    Respiratory: Positive for cough and shortness of breath. Negative for sputum production and wheezing.    Cardiovascular: Negative for chest pain and claudication.   Gastrointestinal: Positive for abdominal pain. Negative for vomiting.   All other systems reviewed and are negative.      Past Medical History:   Diagnosis Date   • Abnormal ECG    • Anemia    • Anxiety    • Asthma    • Depression    • Diabetes mellitus (CMS/HCC)    • DVT (deep venous thrombosis) (CMS/HCC)    • Factor 5 Leiden mutation, heterozygous (CMS/HCC)    • Fibroid    • GERD  "(gastroesophageal reflux disease)    • Gestational diabetes    • Gout    • Hyperlipidemia    • Hypothyroid    • Kidney stone    • Migraines    • Neuropathy    • Ovarian cyst    • PE (pulmonary embolism)    • Polycystic ovary syndrome    • Preeclampsia    • Rh incompatibility    • Urinary tract infection    • Varicella        Allergies   Allergen Reactions   • Amoxicillin Hives   • Haldol [Haloperidol] Hives   • Penicillins Hives   • Toradol [Ketorolac Tromethamine] Hives   • Tramadol Swelling       Past Surgical History:   Procedure Laterality Date   • CARDIAC CATHETERIZATION     •  SECTION     • CHOLECYSTECTOMY     • COLONOSCOPY         Family History   Problem Relation Age of Onset   • No Known Problems Mother    • No Known Problems Father    • No Known Problems Sister    • No Known Problems Brother    • No Known Problems Son    • No Known Problems Daughter    • No Known Problems Maternal Grandmother    • No Known Problems Maternal Grandfather    • No Known Problems Paternal Grandmother    • No Known Problems Paternal Grandfather    • No Known Problems Cousin    • Rheum arthritis Neg Hx    • Osteoarthritis Neg Hx    • Asthma Neg Hx    • Diabetes Neg Hx    • Heart failure Neg Hx    • Hyperlipidemia Neg Hx    • Hypertension Neg Hx    • Migraines Neg Hx    • Rashes / Skin problems Neg Hx    • Seizures Neg Hx    • Stroke Neg Hx    • Thyroid disease Neg Hx        Social History     Socioeconomic History   • Marital status:      Spouse name: Not on file   • Number of children: Not on file   • Years of education: Not on file   • Highest education level: Not on file   Tobacco Use   • Smoking status: Never Smoker   • Smokeless tobacco: Never Used   Substance and Sexual Activity   • Alcohol use: No   • Drug use: Never     Comment: Pt has track marks on right arm. Pt states \"It's from my INR being drawn.\"    • Sexual activity: Defer           Objective   Physical Exam  Vitals and nursing note reviewed. "   Constitutional:       Appearance: She is obese. She is not ill-appearing.   HENT:      Head: Normocephalic and atraumatic.      Mouth/Throat:      Mouth: Mucous membranes are moist.   Eyes:      Pupils: Pupils are equal, round, and reactive to light.   Cardiovascular:      Rate and Rhythm: Tachycardia present.  No extrasystoles are present.     Heart sounds: No murmur.      Comments: 2+ radial pulse bilateral.  Pulmonary:      Effort: Pulmonary effort is normal.      Breath sounds: Normal breath sounds. No decreased breath sounds, wheezing, rhonchi or rales.      Comments: Speaks in full sentences no accessory muscle use no rhonchi's rales or wheezes.  Abdominal:      General: Bowel sounds are normal.      Palpations: Abdomen is soft.      Comments: Large pannus.  Unable appreciate organomegaly due to body habitus.  No palpable masses appreciated.   Musculoskeletal:      Cervical back: Neck supple.      Right lower leg: No tenderness. No edema.      Left lower leg: No tenderness. No edema.   Skin:     General: Skin is warm and dry.      Capillary Refill: Capillary refill takes less than 2 seconds.   Neurological:      Mental Status: She is alert and oriented to person, place, and time.      Cranial Nerves: No cranial nerve deficit.   Psychiatric:         Mood and Affect: Mood normal.         Procedures  Results for orders placed or performed during the hospital encounter of 03/15/21   COVID-19 and FLU A/B PCR - Swab, Nasopharynx    Specimen: Nasopharynx; Swab   Result Value Ref Range    COVID19 Not Detected Not Detected - Ref. Range    Influenza A PCR Not Detected Not Detected    Influenza B PCR Not Detected Not Detected   Comprehensive Metabolic Panel    Specimen: Hand, Left; Blood   Result Value Ref Range    Glucose 461 (C) 65 - 99 mg/dL    BUN 10 6 - 20 mg/dL    Creatinine 0.61 0.57 - 1.00 mg/dL    Sodium 131 (L) 136 - 145 mmol/L    Potassium 4.3 3.5 - 5.2 mmol/L    Chloride 93 (L) 98 - 107 mmol/L    CO2 18.3  (L) 22.0 - 29.0 mmol/L    Calcium 9.7 8.6 - 10.5 mg/dL    Total Protein 8.0 6.0 - 8.5 g/dL    Albumin 3.90 3.50 - 5.20 g/dL    ALT (SGPT) 28 1 - 33 U/L    AST (SGOT) 41 (H) 1 - 32 U/L    Alkaline Phosphatase 97 39 - 117 U/L    Total Bilirubin 0.2 0.0 - 1.2 mg/dL    eGFR Non African Amer 112 >60 mL/min/1.73    Globulin 4.1 gm/dL    A/G Ratio 1.0 g/dL    BUN/Creatinine Ratio 16.4 7.0 - 25.0    Anion Gap 19.7 (H) 5.0 - 15.0 mmol/L   Lipase    Specimen: Hand, Left; Blood   Result Value Ref Range    Lipase 57 13 - 60 U/L   Urinalysis With Microscopic If Indicated (No Culture) - Urine, Clean Catch    Specimen: Urine, Clean Catch   Result Value Ref Range    Color, UA Yellow Yellow, Straw    Appearance, UA Clear Clear    pH, UA <=5.0 5.0 - 8.0    Specific Gravity, UA 1.027 1.005 - 1.030    Glucose, UA >=1000 mg/dL (3+) (A) Negative    Ketones, UA Negative Negative    Bilirubin, UA Negative Negative    Blood, UA Negative Negative    Protein, UA Negative Negative    Leuk Esterase, UA Negative Negative    Nitrite, UA Negative Negative    Urobilinogen, UA 0.2 E.U./dL 0.2 - 1.0 E.U./dL   Protime-INR    Specimen: Hand, Left; Blood   Result Value Ref Range    Protime 11.7 (L) 11.9 - 14.1 Seconds    INR 0.87 (L) 0.90 - 1.10   Lactic Acid, Plasma    Specimen: Hand, Left; Blood   Result Value Ref Range    Lactate 4.9 (C) 0.5 - 2.0 mmol/L   Troponin    Specimen: Blood   Result Value Ref Range    Troponin T <0.010 0.000 - 0.030 ng/mL   BNP    Specimen: Blood   Result Value Ref Range    proBNP 6.0 0.0 - 450.0 pg/mL   Magnesium    Specimen: Blood   Result Value Ref Range    Magnesium 1.6 1.6 - 2.6 mg/dL   CBC Auto Differential    Specimen: Hand, Left; Blood   Result Value Ref Range    WBC 5.55 3.40 - 10.80 10*3/mm3    RBC 4.02 3.77 - 5.28 10*6/mm3    Hemoglobin 12.2 12.0 - 15.9 g/dL    Hematocrit 35.3 34.0 - 46.6 %    MCV 87.8 79.0 - 97.0 fL    MCH 30.3 26.6 - 33.0 pg    MCHC 34.6 31.5 - 35.7 g/dL    RDW 15.0 12.3 - 15.4 %    RDW-SD 47.3  37.0 - 54.0 fl    MPV 9.2 6.0 - 12.0 fL    Platelets 292 140 - 450 10*3/mm3    Neutrophil % 63.8 42.7 - 76.0 %    Lymphocyte % 25.6 19.6 - 45.3 %    Monocyte % 7.2 5.0 - 12.0 %    Eosinophil % 2.0 0.3 - 6.2 %    Basophil % 0.7 0.0 - 1.5 %    Immature Grans % 0.7 (H) 0.0 - 0.5 %    Neutrophils, Absolute 3.54 1.70 - 7.00 10*3/mm3    Lymphocytes, Absolute 1.42 0.70 - 3.10 10*3/mm3    Monocytes, Absolute 0.40 0.10 - 0.90 10*3/mm3    Eosinophils, Absolute 0.11 0.00 - 0.40 10*3/mm3    Basophils, Absolute 0.04 0.00 - 0.20 10*3/mm3    Immature Grans, Absolute 0.04 0.00 - 0.05 10*3/mm3    nRBC 0.0 0.0 - 0.2 /100 WBC   Timed Lactic Acid, Reflex    Specimen: Hand, Left; Blood   Result Value Ref Range    Lactate 4.2 (C) 0.5 - 2.0 mmol/L   Acetone    Specimen: Hand, Left; Blood   Result Value Ref Range    Acetone Negative Negative   Blood Gas, Venous With Co-Ox    Specimen: Venous Blood   Result Value Ref Range    Site Lab     pH, Venous 7.442 (H) 7.320 - 7.420 pH Units    pCO2, Venous 33.1 (L) 41.0 - 51.0 mm Hg    pO2, Venous 86.2 (H) 27.0 - 53.0 mm Hg    HCO3, Venous 22.6 22.0 - 28.0 mmol/L    Base Excess, Venous -1.0 (L) 0.0 - 2.0 mmol/L    Hemoglobin, Blood Gas 12.3 (L) 13.5 - 17.5 g/dL    CO2 Content 23.6 22 - 33 mmol/L    Temperature 37.0 C    Barometric Pressure for Blood Gas 730 mmHg    Modality Room Air     FIO2 21 %    Ventilator Mode NA     Collected by [id number]     Oxyhemoglobin Venous 95.5 (H) 45.0 - 75.0 %    Carboxyhemoglobin Venous 1.5 0.0 - 5.0 %    Methemoglobin Venous 0.5 0.0 - 3.0 %   C-reactive Protein    Specimen: Hand, Left; Blood   Result Value Ref Range    C-Reactive Protein 2.89 (H) 0.00 - 0.50 mg/dL   POC Glucose Once    Specimen: Blood   Result Value Ref Range    Glucose 436 (C) 70 - 130 mg/dL   POC Glucose Once    Specimen: Blood   Result Value Ref Range    Glucose 445 (C) 70 - 130 mg/dL   Light Blue Top   Result Value Ref Range    Extra Tube hold for add-on    Green Top (Gel)   Result Value Ref Range     Extra Tube Hold for add-ons.    Lavender Top   Result Value Ref Range    Extra Tube hold for add-on    Gold Top - SST   Result Value Ref Range    Extra Tube Hold for add-ons.      CT Abdomen Pelvis Without Contrast    Result Date: 2/28/2021  Narrative: CT ABDOMEN AND PELVIS, NONCONTRAST, 2/28/2021 HISTORY: 35-year-old female in the ED complaining of left ankle pain. History of kidney stones. History of right ovary mass. TECHNIQUE: CT imaging of the abdomen and pelvis, noncontrast kidney stone protocol. Radiation dose reduction techniques included automated exposure control. Radiation audit for CT and nuclear cardiology exams in the last 12 months: 3. COMPARISON: *  CT stone study, 1/10/2021 ABDOMEN FINDINGS: Nonobstructing 7 mm left lower pole renal calculus and several tiny nonobstructing right renal calculi are noted. However, there is no stone material within the ureters or urinary bladder today, there is no evidence of urinary obstruction. Marked hepatomegaly. Mild diffuse hepatic steatosis. Mild splenomegaly. Pancreas is unremarkable. Cholecystectomy. No significant bile duct dilatation. Small bowel and colon are normal in caliber and appearance, as imaged. The appendix is not clearly demonstrated, but there is no indirect evidence of acute appendicitis. The stomach is nondistended, and there is no distal esophageal dilatation or hiatal hernia. Normal caliber abdominal aorta. Suspected postoperative changes within the ventral abdominal wall at the level of the umbilicus with soft tissue thickening as well as a small collection of fluid and air in the region of rectus sheath musculature at the level of umbilicus. Correlate for umbilical region soft tissue inflammation. The collection may represent a small wound abscess, but could also represent herniation of unobstructed bowel. PELVIS FINDINGS: Previous right ovary mass has apparently been resected. Uterus and left ovary are normal in size and noncontrast  appearance. No bladder distention. Rectum is negative.     Impression: 1.  Bilateral nephrolithiasis. No ureteral stone material. No evidence of upper urinary tract obstruction today. 2.  Presumed postoperative changes of the umbilicus with soft tissue thickening and a small collection of fluid and air within the periumbilical soft tissues. This may represent a postoperative abscess. It could also represent herniation of unobstructed bowel. This cannot be distinguished on this study performed without oral or IV contrast. 3.  Marked hepatomegaly and diffuse hepatic steatosis, unchanged. Cholecystectomy. 4.  Previous right ovary mass has been resected. Signer Name: Esteban Banegas MD  Signed: 2/28/2021 1:43 AM  Workstation Name: [facility]  [facility]    US Pelvis Complete    Result Date: 3/13/2021  Narrative: US Pelvis Non-OB Comp INDICATION: Pelvic pain on left side. Recent right ovarian cancer removed 1 month ago. COMPARISON: CT Abdomen pelvis from the same day TECHNIQUE: Transvaginal imaging was performed FINDINGS: The right ovary is been removed. The uterus is 6.7 x 3.7 x 4.1 cm. Left ovary is visualized and has normal flow. It measures 3 cm in diameter. The endometrial tissue is 5 mm in thickness. Uterus appears normal IMPRESSION: Right ovary has been removed. The uterus and left ovary appear normal. Signer Name: Gerardo Jorgensen MD  Signed: 3/13/2021 2:57 AM  Workstation Name: [email or web address]  [facility]    CT Abdomen Pelvis With Contrast    Result Date: 3/15/2021  Narrative: CT Abdomen Pelvis W INDICATION: 35-year-old female with abdominal and flank pain. In the emergency Department. History of ovarian cancer. Left-sided abdominal pain 3 days. TECHNIQUE: CT of the abdomen and pelvis with IV contrast. Coronal and sagittal reconstructions were obtained.  Radiation dose reduction techniques included automated exposure control or exposure modulation based on body size.  Count of known CT and cardiac nuc med studies performed in previous 12 months: 4. COMPARISON: 3/13/2021 FINDINGS: Abdomen: Motion degradation. No effusion. Normal caliber aorta. Spleen is enlarged and measures 16 cm craniocaudal. The liver is enlarged and measures over 30 cm craniocaudal. There is hepatic steatosis. Unremarkable adrenal glands and pancreas is negative. Gallbladder surgically absent. No ascites. Stable nonspecific area of decreased attenuation in the left hepatic lobe measuring 2.5 cm, indeterminate on this single phase study in the setting of malignancy. The kidneys are nonobstructed. There are bilateral nonobstructing stones measuring 10 mm on the left and about 6 mm on the right. 2 small to characterize but likely benign right renal cyst. There is no adenopathy. Pelvis: Negative bladder. No drainable fluid collection. The bowel is nonobstructed. The appendix is normal. No free air identified. There is no inguinal adenopathy or fluid collection. Probable midline surgical scar associated with the inferior umbilicus. Interval decrease in probable small amount volume of fluid associated with the midline scar. Small dot of air within the presumed postoperative scar, decreased in the short term. Additional small foci of subcutaneous air within the subcutaneous fat of the abdominal wall likely reflect sequela of recent injections. No new suspicious bone lesion.     Impression: 1. No clearly acute process in the abdomen or pelvis. Normal appendix. 2. Hepatosplenomegaly and hepatic steatosis. 3. Nonspecific area of decreased attenuation in the left hepatic lobe measuring 2.5 cm. Malignancy not excluded given the history of ovarian cancer. This could be best further assessed with liver protocol MRI if the patient is candidate. 4. Nonobstructing renal stones bilaterally. 5. Probable midline surgical scar associated with the umbilicus. Interval decrease in air and small volume fluid. Signer Name: Kailash  MD Yesenia  Signed: 3/15/2021 6:31 AM  Workstation Name: [facility]  [facility]    CT Abdomen Pelvis With Contrast    Result Date: 3/13/2021  Narrative: CT Abdomen Pelvis W INDICATION: History of ovarian cancer with left-sided pain for 2 days TECHNIQUE: CT of the abdomen and pelvis with IV contrast. Coronal and sagittal reconstructions were obtained.  Radiation dose reduction techniques included automated exposure control or exposure modulation based on body size. Count of known CT and cardiac nuc med studies performed in previous 12 months: 3. COMPARISON: 2/28/2021 FINDINGS: Abdomen: The study is degraded by motion. The lung bases are clear. The liver is markedly enlarged and shows decreased density consistent with hepatic steatosis. There are several irregular areas of decreased density in the far left lobe of the liver measuring up to a centimeter in diameter. These were visible on the prior study from 2/20/2021 but they were difficult to visualize because of motion. These areas are new from older studies from 2000. The spleen, pancreas, adrenal glands and kidneys are normal except for nonobstructing 8 mm stone in the lower pole left kidney. The bowel is normal.. The aorta is normal in size. There is no adenopathy. The postoperative changes in the region of the umbilicus are again noted. Small 1 collection of air bubbles within this soft tissue is stable. Pelvis: The uterus is present. No adnexal masses are visible. The right ovary may have been removed. The bladder is normal. Bones are unremarkable.     Impression: Marked hepatomegaly. Today's study clearly shows scattered areas of decreased density in the far left lobe of the liver which are nonspecific. They are certainly not typical of metastatic disease The patient has had ovarian mass removed. These densities were not behind site visible 2 weeks ago but not visible on older studies from a few months ago. They may represent areas  of focal fatty infiltration. The entire liver is somewhat fatty infiltrated. Clinical correlation would likely a metastatic disease is recommended and if necessary an MRI liver could be performed. Postoperative changes in the region of the umbilicus with a small 1 cm fluid and air collection in this region is stable from 2/28/2021. This is surrounded by what appears be postoperative change in the abdominal wall. No cause for the left-sided abdominal pain is identified. There is a nonobstructing left renal stone. Signer Name: Gerardo Jorgensen MD  Signed: 3/13/2021 1:32 AM  Workstation Name: [email or web address]  [facility]    CT Abdomen Pelvis With Contrast    Result Date: 2/28/2021  Narrative: CT ABDOMEN AND PELVIS WITH CONTRAST, 2/28/2021 HISTORY: 35-year-old female in the ED tonight complaining of left flank pain. History of kidney stones. History of surgery for right ovary mass. Earlier noncontrast examination tonight showed a collection of fluid and air within the anterior abdominal wall at the umbilicus. Abscess versus hernia. White blood cell count is normal. The examination is repeated with IV contrast. TECHNIQUE: CT imaging of the abdomen and pelvis with IV contrast. Radiation dose reduction techniques included automated exposure control. Radiation audit for CT and nuclear cardiology exams in the last 12 months: 4. COMPARISON: *  Noncontrast CT abdomen/pelvis earlier tonight. ABDOMEN FINDINGS: Presumed postoperative changes deep to the umbilicus with soft tissue thickening. Within this tissue, there is a small collection of fluid and air measuring about 3.0 cm in diameter. This does not appear to represent herniation of bowel. Postoperative fluid collection and/or abscess is suspected. Correlate for evidence of infection/inflammation in this region. Hepatomegaly and diffuse hepatic steatosis. Mild splenomegaly. Cholecystectomy with no bile duct dilatation. Hepatic vasculature is patent. Bilateral  nonobstructing renal calculi. Both kidneys enhance normally with no evidence of renal inflammation. Small bowel and colon are normal in caliber and appearance, as imaged. The appendix is now well-visualized and is normal. PELVIS FINDINGS: Previous right ovary mass has been resected. Uterus and left ovary are unremarkable. Bladder and rectum are within normal limits. No inguinal hernia.     Impression: 1.  Soft tissue thickening within the anterior abdominal wall deep to the umbilicus likely related to prior surgery (no details available). Small collection of fluid and air within the soft tissues measuring about 3.0 cm. Postoperative collection or abscess suspected. This does not represent bowel within a hernia. 2.  No visible abnormal wall hernia. No intra-abdominal fluid collections. 3.  Bilateral nephrolithiasis. No evidence of upper urinary tract obstruction or renal inflammation. 4.  Hepatosplenomegaly and diffuse hepatic steatosis. Cholecystectomy. 5.  Previous right ovary mass has been resected. Signer Name: Esteban Banegas MD  Signed: 2/28/2021 2:54 AM  Workstation Name: [email or web address]  [facility]    CT Chest Pulmonary Embolism    Result Date: 3/15/2021  Narrative: CT CHEST PULMONARY EMBOLISM W CONTRAST INDICATION: Shortness of air in the emergency department. History of ovarian malignancy. TECHNIQUE: CT angiogram of the chest with IV contrast. 3-D reconstructions were obtained and reviewed.   Radiation dose reduction techniques included automated exposure control or exposure modulation based on body size. Count of known CT and cardiac nuc med studies performed in previous 12 months: 5. COMPARISON: 11/25/2019 FINDINGS: There is no effusion. There are scattered areas of bandlike atelectasis. In the subpleural right upper lobe there is a 4 mm noncalcified nodule, unchanged in the comparison interval and therefore favored to be benign. Documentation of stability over 2 years on imaging  however recommended to confirm benignity. No pneumothorax. Central airways are patent. No pleural or pericardial effusion. No threshold adenopathy. The included upper abdomen demonstrates hepatosplenomegaly. There is also hepatic steatosis with an indeterminate area of low attenuation in the left hepatic lobe measuring 2.5 cm.  Abdomen and pelvis CT dictated separately. No aortic aneurysm or dissection. Suboptimal bolus timing. There is no large filling defect in the main left or right central pulmonary arteries. Beyond the first and proximal second order branches, the pulmonary arteries are not well-opacified or assessed and the presence or absence of PE cannot be ascertained. No suspicious bone lesions. Degenerative change.     Impression: 1. No aortic aneurysm or dissection. 2. No PE in the main left or right central pulmonary arteries. Suboptimal bolus timing and presence or absence of PE is otherwise not well ascertained. 3. Noncalcified 4 mm nodule in the right upper lobe. This is likely to be benign. See discussion above. 4. Hepatosplenomegaly with hepatic steatosis. Indeterminate 2.5 cm area of decreased attenuation in the left hepatic lobe. Abdomen and pelvis CT dictated separately. Signer Name: Kailash Patterson MD  Signed: 3/15/2021 7:37 AM  Workstation Name: [email or web address]  [facility]    XR Chest AP    Result Date: 3/15/2021  Narrative: CR Chest 1 Vw INDICATION: Shortness of air this morning COMPARISON:  None available. FINDINGS: Single portable AP view(s) of the chest.  The heart and mediastinal contours are normal. The lungs are clear. No pneumothorax or pleural effusion.     Impression: No acute cardiopulmonary findings. Signer Name: Gerardo Jorgensen MD  Signed: 3/15/2021 4:01 AM  Workstation Name: [email or web address]  [facility]             ED Course  ED Course as of Mar 15 1102   Mon Mar 15, 2021   0340 EKG sinus tachycardia ventricular 110  QRS 94  no ST  elevation.    [BB]   0345 Patient will be hypertensive have ordered IV hydralazine.    [BB]   0351 Patient states that she currently is on Lovenox 1 mg/kg every 12 hours    [BB]   0408 Patient's white blood cell count hemoglobin hematocrit unremarkable.    [BB]   0408 Urinalysis shows elevated glucose greater than thousand.    [BB]   0409 XR Chest AP    Result Date: 3/15/2021  No acute cardiopulmonary findings. Signer Name: Gerardo Jorgensen MD  Signed: 3/15/2021 4:01 AM  Workstation Name: [employer]  [facility]        [BB]   0413 Patient PT 1.7 INR 0.87    [BB]   0425 Patient BNP troponin magnesium unremarkable.    [BB]   0447 Patient is noted to have elevated lactic acid of 4.9 patient is noted to have elevated glucose of 461 will obtain ABG as well as serum ketones.    [BB]   0504 Patient's ABG is unremarkable.    [BB]   0508 Patient ketones negative.    [BB]   0545 Patient CRP 2.89    [BB]   0545 CRP similar to previous checks over the past year.    [BB]   0556 Patient elevated lactic acid possibly due to patient's underlying malignancy as well as related to her elevated glucose.  Will repeat lactic acid at scheduled interval we will continue to monitor.    [BB]   0622 Patient is noted to have elevated heart rate and BP patient states that she takes metoprolol 25 mg at home will give IV Lopressor.  We will continue to monitor.    [BB]   0654 Have discussed case with Dr. Shaw who has assumed care    [BB]   0840 Care assumed from Dr. Aparicio at shift change.  Patient continues to complain of some pain, but improved.  Glucose poorly controlled.  Will repeat insulin.  Imaging shows possible low-density area in the liver which could be related to her previous ovarian cancer.  She has a follow-up at [facility] in about 3 days.  They can discuss further testing such as liver MRI as recommended by radiology.  She has a chronically elevated lactic acid and this is essentially at her baseline.   She has a history of kidney stones and wonders whether she could have passed a stone today.  She certainly has several stones in her kidneys and this is definitely a possibility.    [BC]      ED Course User Index  [BB] Tho Aparicio MD  [BC] Taras Shaw MD                                           MDM  Number of Diagnoses or Management Options     Amount and/or Complexity of Data Reviewed  Clinical lab tests: reviewed  Tests in the radiology section of CPT®: reviewed  Tests in the medicine section of CPT®: reviewed  Decide to obtain previous medical records or to obtain history from someone other than the patient: yes    Risk of Complications, Morbidity, and/or Mortality  Presenting problems: high  Diagnostic procedures: high  Management options: high        Final diagnoses:   Flank pain   Malignant neoplasm of ovary, unspecified laterality (CMS/HCC)   Liver lesion   Elevated lactic acid level   Insulin dependent type 2 diabetes mellitus, uncontrolled (CMS/HCC)   Essential hypertension            Taras Shaw MD  03/15/21 5605

## 2021-03-16 LAB
QT INTERVAL: 336 MS
QTC INTERVAL: 454 MS

## 2021-03-18 ENCOUNTER — HOSPITAL ENCOUNTER (EMERGENCY)
Facility: HOSPITAL | Age: 36
Discharge: HOME | End: 2021-03-19
Attending: FAMILY MEDICINE | Admitting: FAMILY MEDICINE

## 2021-03-18 DIAGNOSIS — M54.50 ACUTE LOW BACK PAIN, UNSPECIFIED BACK PAIN LATERALITY, UNSPECIFIED WHETHER SCIATICA PRESENT: Primary | ICD-10-CM

## 2021-03-18 DIAGNOSIS — R73.9 HYPERGLYCEMIA: ICD-10-CM

## 2021-03-18 DIAGNOSIS — I10 HYPERTENSION, UNSPECIFIED TYPE: ICD-10-CM

## 2021-03-18 DIAGNOSIS — R10.32 LEFT LOWER QUADRANT PAIN: ICD-10-CM

## 2021-03-18 DIAGNOSIS — C56.9 MALIGNANT NEOPLASM OF OVARY, UNSPECIFIED LATERALITY (HCC): ICD-10-CM

## 2021-03-18 DIAGNOSIS — N30.00 ACUTE CYSTITIS WITHOUT HEMATURIA: ICD-10-CM

## 2021-03-18 LAB
APTT PPP: 30.1 SECONDS (ref 25.6–35.3)
ATMOSPHERIC PRESS: 722 MMHG
BASE EXCESS BLDV CALC-SCNC: -3.5 MMOL/L (ref 0–2)
BASOPHILS # BLD AUTO: 0.04 10*3/MM3 (ref 0–0.2)
BASOPHILS NFR BLD AUTO: 0.8 % (ref 0–1.5)
BDY SITE: ABNORMAL
BODY TEMPERATURE: 37 C
CO2 BLDA-SCNC: 22 MMOL/L (ref 22–33)
COHGB MFR BLD: 1.5 % (ref 0–5)
DEPRECATED RDW RBC AUTO: 47.5 FL (ref 37–54)
EOSINOPHIL # BLD AUTO: 0.09 10*3/MM3 (ref 0–0.4)
EOSINOPHIL NFR BLD AUTO: 1.7 % (ref 0.3–6.2)
ERYTHROCYTE [DISTWIDTH] IN BLOOD BY AUTOMATED COUNT: 15.1 % (ref 12.3–15.4)
HCO3 BLDV-SCNC: 20.9 MMOL/L (ref 22–28)
HCT VFR BLD AUTO: 36.1 % (ref 34–46.6)
HGB BLD-MCNC: 12.5 G/DL (ref 12–15.9)
HGB BLDA-MCNC: 12.7 G/DL (ref 13.5–17.5)
IMM GRANULOCYTES # BLD AUTO: 0.02 10*3/MM3 (ref 0–0.05)
IMM GRANULOCYTES NFR BLD AUTO: 0.4 % (ref 0–0.5)
INHALED O2 CONCENTRATION: 21 %
INR PPP: 0.92 (ref 0.9–1.1)
LIPASE SERPL-CCNC: 43 U/L (ref 13–60)
LYMPHOCYTES # BLD AUTO: 1.28 10*3/MM3 (ref 0.7–3.1)
LYMPHOCYTES NFR BLD AUTO: 24.8 % (ref 19.6–45.3)
Lab: ABNORMAL
MCH RBC QN AUTO: 30.3 PG (ref 26.6–33)
MCHC RBC AUTO-ENTMCNC: 34.6 G/DL (ref 31.5–35.7)
MCV RBC AUTO: 87.4 FL (ref 79–97)
METHGB BLD QL: 0.4 % (ref 0–3)
MODALITY: ABNORMAL
MONOCYTES # BLD AUTO: 0.46 10*3/MM3 (ref 0.1–0.9)
MONOCYTES NFR BLD AUTO: 8.9 % (ref 5–12)
NEUTROPHILS NFR BLD AUTO: 3.28 10*3/MM3 (ref 1.7–7)
NEUTROPHILS NFR BLD AUTO: 63.4 % (ref 42.7–76)
NOTIFIED BY: ABNORMAL
NOTIFIED WHO: ABNORMAL
NRBC BLD AUTO-RTO: 0 /100 WBC (ref 0–0.2)
OXYHGB MFR BLDV: 90.5 % (ref 45–75)
PCO2 BLDV: 35 MM HG (ref 41–51)
PH BLDV: 7.38 PH UNITS (ref 7.32–7.42)
PLATELET # BLD AUTO: 329 10*3/MM3 (ref 140–450)
PMV BLD AUTO: 9.1 FL (ref 6–12)
PO2 BLDV: 58.3 MM HG (ref 27–53)
PROTHROMBIN TIME: 12.2 SECONDS (ref 11.9–14.1)
RBC # BLD AUTO: 4.13 10*6/MM3 (ref 3.77–5.28)
SAO2 % BLDCOV: 92.3 % (ref 45–75)
VENTILATOR MODE: ABNORMAL
WBC # BLD AUTO: 5.17 10*3/MM3 (ref 3.4–10.8)

## 2021-03-18 PROCEDURE — 83690 ASSAY OF LIPASE: CPT | Performed by: FAMILY MEDICINE

## 2021-03-18 PROCEDURE — 82805 BLOOD GASES W/O2 SATURATION: CPT

## 2021-03-18 PROCEDURE — 85610 PROTHROMBIN TIME: CPT | Performed by: FAMILY MEDICINE

## 2021-03-18 PROCEDURE — 96374 THER/PROPH/DIAG INJ IV PUSH: CPT

## 2021-03-18 PROCEDURE — 99284 EMERGENCY DEPT VISIT MOD MDM: CPT

## 2021-03-18 PROCEDURE — 96375 TX/PRO/DX INJ NEW DRUG ADDON: CPT

## 2021-03-18 PROCEDURE — 82820 HEMOGLOBIN-OXYGEN AFFINITY: CPT

## 2021-03-18 PROCEDURE — 81001 URINALYSIS AUTO W/SCOPE: CPT | Performed by: FAMILY MEDICINE

## 2021-03-18 PROCEDURE — 25010000002 ONDANSETRON PER 1 MG: Performed by: FAMILY MEDICINE

## 2021-03-18 PROCEDURE — 80053 COMPREHEN METABOLIC PANEL: CPT | Performed by: FAMILY MEDICINE

## 2021-03-18 PROCEDURE — 81025 URINE PREGNANCY TEST: CPT | Performed by: FAMILY MEDICINE

## 2021-03-18 PROCEDURE — 25010000002 HYDROMORPHONE 1 MG/ML SOLUTION: Performed by: FAMILY MEDICINE

## 2021-03-18 PROCEDURE — 85730 THROMBOPLASTIN TIME PARTIAL: CPT | Performed by: FAMILY MEDICINE

## 2021-03-18 PROCEDURE — 85025 COMPLETE CBC W/AUTO DIFF WBC: CPT | Performed by: FAMILY MEDICINE

## 2021-03-18 RX ORDER — SODIUM CHLORIDE 0.9 % (FLUSH) 0.9 %
10 SYRINGE (ML) INJECTION AS NEEDED
Status: DISCONTINUED | OUTPATIENT
Start: 2021-03-18 | End: 2021-03-19 | Stop reason: HOSPADM

## 2021-03-18 RX ORDER — ONDANSETRON 2 MG/ML
4 INJECTION INTRAMUSCULAR; INTRAVENOUS ONCE
Status: COMPLETED | OUTPATIENT
Start: 2021-03-18 | End: 2021-03-18

## 2021-03-18 RX ORDER — HYDROMORPHONE HYDROCHLORIDE 1 MG/ML
0.5 INJECTION, SOLUTION INTRAMUSCULAR; INTRAVENOUS; SUBCUTANEOUS ONCE
Status: COMPLETED | OUTPATIENT
Start: 2021-03-18 | End: 2021-03-18

## 2021-03-18 RX ADMIN — ONDANSETRON 4 MG: 2 INJECTION INTRAMUSCULAR; INTRAVENOUS at 23:55

## 2021-03-18 RX ADMIN — HYDROMORPHONE HYDROCHLORIDE 0.5 MG: 1 INJECTION, SOLUTION INTRAMUSCULAR; INTRAVENOUS; SUBCUTANEOUS at 23:54

## 2021-03-19 ENCOUNTER — APPOINTMENT (OUTPATIENT)
Dept: GENERAL RADIOLOGY | Facility: HOSPITAL | Age: 36
End: 2021-03-19

## 2021-03-19 VITALS
TEMPERATURE: 98.7 F | WEIGHT: 290 LBS | DIASTOLIC BLOOD PRESSURE: 99 MMHG | OXYGEN SATURATION: 93 % | BODY MASS INDEX: 49.51 KG/M2 | RESPIRATION RATE: 18 BRPM | HEIGHT: 64 IN | SYSTOLIC BLOOD PRESSURE: 165 MMHG | HEART RATE: 115 BPM

## 2021-03-19 LAB
ACETONE BLD QL: NEGATIVE
ALBUMIN SERPL-MCNC: 4.26 G/DL (ref 3.5–5.2)
ALBUMIN/GLOB SERPL: 1 G/DL
ALP SERPL-CCNC: 93 U/L (ref 39–117)
ALT SERPL W P-5'-P-CCNC: 25 U/L (ref 1–33)
ANION GAP SERPL CALCULATED.3IONS-SCNC: 16.3 MMOL/L (ref 5–15)
AST SERPL-CCNC: 43 U/L (ref 1–32)
B-HCG UR QL: NEGATIVE
BACTERIA UR QL AUTO: ABNORMAL /HPF
BILIRUB SERPL-MCNC: 0.3 MG/DL (ref 0–1.2)
BILIRUB UR QL STRIP: NEGATIVE
BUN SERPL-MCNC: 13 MG/DL (ref 6–20)
BUN/CREAT SERPL: 22.8 (ref 7–25)
CALCIUM SPEC-SCNC: 10.1 MG/DL (ref 8.6–10.5)
CHLORIDE SERPL-SCNC: 95 MMOL/L (ref 98–107)
CLARITY UR: CLEAR
CO2 SERPL-SCNC: 19.7 MMOL/L (ref 22–29)
COLOR UR: YELLOW
CREAT SERPL-MCNC: 0.57 MG/DL (ref 0.57–1)
GFR SERPL CREATININE-BSD FRML MDRD: 121 ML/MIN/1.73
GLOBULIN UR ELPH-MCNC: 4.1 GM/DL
GLUCOSE BLDC GLUCOMTR-MCNC: 366 MG/DL (ref 70–130)
GLUCOSE BLDC GLUCOMTR-MCNC: 402 MG/DL (ref 70–130)
GLUCOSE BLDC GLUCOMTR-MCNC: 445 MG/DL (ref 70–130)
GLUCOSE SERPL-MCNC: 484 MG/DL (ref 65–99)
GLUCOSE UR STRIP-MCNC: ABNORMAL MG/DL
HGB UR QL STRIP.AUTO: NEGATIVE
HYALINE CASTS UR QL AUTO: ABNORMAL /LPF
KETONES UR QL STRIP: ABNORMAL
LEUKOCYTE ESTERASE UR QL STRIP.AUTO: ABNORMAL
NITRITE UR QL STRIP: NEGATIVE
PH UR STRIP.AUTO: 5.5 [PH] (ref 5–8)
POTASSIUM SERPL-SCNC: 4.4 MMOL/L (ref 3.5–5.2)
PROT SERPL-MCNC: 8.4 G/DL (ref 6–8.5)
PROT UR QL STRIP: ABNORMAL
RBC # UR: ABNORMAL /HPF
REF LAB TEST METHOD: ABNORMAL
SODIUM SERPL-SCNC: 131 MMOL/L (ref 136–145)
SP GR UR STRIP: >=1.03 (ref 1–1.03)
SQUAMOUS #/AREA URNS HPF: ABNORMAL /HPF
UROBILINOGEN UR QL STRIP: ABNORMAL
WBC UR QL AUTO: ABNORMAL /HPF

## 2021-03-19 PROCEDURE — 72110 X-RAY EXAM L-2 SPINE 4/>VWS: CPT

## 2021-03-19 PROCEDURE — 96376 TX/PRO/DX INJ SAME DRUG ADON: CPT

## 2021-03-19 PROCEDURE — 82009 KETONE BODYS QUAL: CPT | Performed by: FAMILY MEDICINE

## 2021-03-19 PROCEDURE — 25010000002 HYDROMORPHONE 1 MG/ML SOLUTION: Performed by: FAMILY MEDICINE

## 2021-03-19 PROCEDURE — 36415 COLL VENOUS BLD VENIPUNCTURE: CPT

## 2021-03-19 PROCEDURE — 82962 GLUCOSE BLOOD TEST: CPT

## 2021-03-19 PROCEDURE — 63710000001 INSULIN REGULAR HUMAN PER 5 UNITS: Performed by: FAMILY MEDICINE

## 2021-03-19 RX ORDER — HYDROCODONE BITARTRATE AND ACETAMINOPHEN 10; 325 MG/1; MG/1
1 TABLET ORAL ONCE
Status: COMPLETED | OUTPATIENT
Start: 2021-03-19 | End: 2021-03-19

## 2021-03-19 RX ORDER — DOXYCYCLINE 100 MG/1
100 CAPSULE ORAL 2 TIMES DAILY
Qty: 14 CAPSULE | Refills: 0 | Status: SHIPPED | OUTPATIENT
Start: 2021-03-19 | End: 2021-05-15 | Stop reason: SDUPTHER

## 2021-03-19 RX ORDER — HYDROMORPHONE HYDROCHLORIDE 1 MG/ML
0.5 INJECTION, SOLUTION INTRAMUSCULAR; INTRAVENOUS; SUBCUTANEOUS ONCE
Status: COMPLETED | OUTPATIENT
Start: 2021-03-19 | End: 2021-03-19

## 2021-03-19 RX ORDER — HYDROCODONE BITARTRATE AND ACETAMINOPHEN 10; 325 MG/1; MG/1
1 TABLET ORAL EVERY 6 HOURS PRN
Qty: 6 TABLET | Refills: 0 | OUTPATIENT
Start: 2021-03-19 | End: 2021-05-15

## 2021-03-19 RX ADMIN — HUMAN INSULIN 10 UNITS: 100 INJECTION, SOLUTION SUBCUTANEOUS at 02:14

## 2021-03-19 RX ADMIN — HUMAN INSULIN 8 UNITS: 100 INJECTION, SOLUTION SUBCUTANEOUS at 00:35

## 2021-03-19 RX ADMIN — HYDROCODONE BITARTRATE AND ACETAMINOPHEN 1 TABLET: 10; 325 TABLET ORAL at 03:14

## 2021-03-19 RX ADMIN — HYDROMORPHONE HYDROCHLORIDE 0.5 MG: 1 INJECTION, SOLUTION INTRAMUSCULAR; INTRAVENOUS; SUBCUTANEOUS at 01:48

## 2021-03-19 RX ADMIN — HYDROMORPHONE HYDROCHLORIDE 0.5 MG: 1 INJECTION, SOLUTION INTRAMUSCULAR; INTRAVENOUS; SUBCUTANEOUS at 00:41

## 2021-03-19 RX ADMIN — SODIUM CHLORIDE 1000 ML: 9 INJECTION, SOLUTION INTRAVENOUS at 00:35

## 2021-03-19 NOTE — ED NOTES
I called [facility] for the patient. They will be here shortly.     Vincent Herman  03/19/21 0304

## 2021-03-19 NOTE — ED PROVIDER NOTES
Subjective   35-year-old female with a history of diabetes DVT factor V Leiden mutation on chronic anticoagulant of Lovenox hyperlipidemia ovarian cancer presents the emergency room with complaints of right-sided flank pain.  Patient reports has had right flank pain going to her back.  She states she has had some radiation to her right lower leg.  She denies bowel urinary incontinence.  She states she has had tingling to her right leg.  She denies fever chills.  She states she had nausea no vomiting.  She states that she thought her appointment with oncology was today however she states she contacted oncology clinic and was told this next Thursday.  Due to symptoms patient came to emergency room for evaluation.        Flank Pain  Pain location:  R flank  Pain quality: aching    Pain radiation: right leg.  Timing:  Constant  Chronicity:  Recurrent  Relieved by:  Nothing  Worsened by:  Movement  Associated symptoms: nausea    Associated symptoms: no chest pain, no constipation, no cough, no diarrhea, no dysuria, no fatigue, no fever, no shortness of breath and no vomiting        Review of Systems   Constitutional: Negative for fatigue and fever.   Respiratory: Negative for cough and shortness of breath.    Cardiovascular: Negative for chest pain.   Gastrointestinal: Positive for nausea. Negative for constipation, diarrhea and vomiting.   Genitourinary: Positive for flank pain. Negative for dysuria.   All other systems reviewed and are negative.      Past Medical History:   Diagnosis Date   • Abnormal ECG    • Anemia    • Anxiety    • Asthma    • Depression    • Diabetes mellitus (CMS/HCC)    • DVT (deep venous thrombosis) (CMS/HCC)    • Factor 5 Leiden mutation, heterozygous (CMS/HCC)    • Fibroid    • GERD (gastroesophageal reflux disease)    • Gestational diabetes    • Gout    • Hyperlipidemia    • Hypothyroid    • Kidney stone    • Migraines    • Neuropathy    • Ovarian cyst    • PE (pulmonary embolism)    •  "Polycystic ovary syndrome    • Preeclampsia    • Rh incompatibility    • Urinary tract infection    • Varicella        Allergies   Allergen Reactions   • Amoxicillin Hives   • Haldol [Haloperidol] Hives   • Penicillins Hives   • Toradol [Ketorolac Tromethamine] Hives   • Tramadol Swelling       Past Surgical History:   Procedure Laterality Date   • CARDIAC CATHETERIZATION     •  SECTION     • CHOLECYSTECTOMY     • COLONOSCOPY         Family History   Problem Relation Age of Onset   • No Known Problems Mother    • No Known Problems Father    • No Known Problems Sister    • No Known Problems Brother    • No Known Problems Son    • No Known Problems Daughter    • No Known Problems Maternal Grandmother    • No Known Problems Maternal Grandfather    • No Known Problems Paternal Grandmother    • No Known Problems Paternal Grandfather    • No Known Problems Cousin    • Rheum arthritis Neg Hx    • Osteoarthritis Neg Hx    • Asthma Neg Hx    • Diabetes Neg Hx    • Heart failure Neg Hx    • Hyperlipidemia Neg Hx    • Hypertension Neg Hx    • Migraines Neg Hx    • Rashes / Skin problems Neg Hx    • Seizures Neg Hx    • Stroke Neg Hx    • Thyroid disease Neg Hx        Social History     Socioeconomic History   • Marital status:      Spouse name: Not on file   • Number of children: Not on file   • Years of education: Not on file   • Highest education level: Not on file   Tobacco Use   • Smoking status: Never Smoker   • Smokeless tobacco: Never Used   Substance and Sexual Activity   • Alcohol use: No   • Drug use: Never     Comment: Pt has track marks on right arm. Pt states \"It's from my INR being drawn.\"    • Sexual activity: Defer           Objective   Physical Exam  Vitals and nursing note reviewed.   Constitutional:       Appearance: She is obese.      Comments: Nontoxic pain   HENT:      Head: Normocephalic and atraumatic.      Mouth/Throat:      Mouth: Mucous membranes are moist.   Eyes:      Comments: " Right exotropia.   Cardiovascular:      Rate and Rhythm: Tachycardia present.      Comments: No murmur appreciated.  2+ radial pulse 2+ dorsalis pedis pulses  Pulmonary:      Effort: Pulmonary effort is normal.      Breath sounds: Normal breath sounds.   Abdominal:      Palpations: Abdomen is soft.      Tenderness: There is no abdominal tenderness.      Comments: Obese.  Unable appreciate organomegaly due to body habitus.  No guarding or rigidity.  Surgical scar midline old healed.   Skin:     General: Skin is warm and dry.   Neurological:      Mental Status: She is alert.      Comments: No saddle anesthesia.  Symmetric/light touch.   Psychiatric:         Mood and Affect: Mood normal.         Procedures           ED Course  ED Course as of Mar 19 1932   Thu Mar 18, 2021   2335 Patient's white blood cell count hemoglobin hematocrit unremarkable    [BB]   2347 Patient's coags unremarkable    [BB]   2355 Patient's venous blood gas is unremarkable patient's lipase coags unremarkable    [BB]   Fri Mar 19, 2021   0020 Patient is noted to be hyperglycemic will initiate IV fluids as well as give IV insulin.    [BB]   0145 XR Spine Lumbar Complete 4+VW    Result Date: 3/19/2021  Negative lumbar spine. Signer Name: Gerardo Jorgensen MD  Signed: 3/19/2021 1:27 AM  Workstation Name: [facility]  [facility]        [BB]   0210 Patient repeat Accu-Chek noted to be 445 therefore will give 10 units of insulin    [BB]   0247 Patient acetone negative.    [BB]   0257 Have discussed findings with patient discussed need follow-up with her oncologist patient is neurovascular intact.  Discussed warning signs informed emergency room who reviewed previous imaging performed few days prior patient verbalized understanding.    [BB]   0300 PRANAY [id number]    [BB]   0302 Accu-Chek 366.    [BB]      ED Course User Index  [BB] Tho Aparicio MD                                           [facility]    Final diagnoses:   Acute low  back pain, unspecified back pain laterality, unspecified whether sciatica present   Acute cystitis without hematuria   Hypertension, unspecified type   Hyperglycemia   Malignant neoplasm of ovary, unspecified laterality (CMS/HCC)       ED Disposition  ED Disposition     ED Disposition Condition Comment    Discharge Stable           PATIENT CONNECTION - [place]  See Provider List  [facility] [address]  [phone]  Today           Medication List      New Prescriptions    doxycycline 100 MG capsule  Commonly known as: MONODOX  Take 1 capsule by mouth 2 (Two) Times a Day.           Where to Get Your Medications      These medications were sent to [address] [phone] [facility] [phone] [address] [address]    Phone: [phone]   · HYDROcodone-acetaminophen  MG per tablet     You can get these medications from any pharmacy    Bring a paper prescription for each of these medications  · doxycycline 100 MG capsule          Tho Aparicio MD  03/19/21 1932

## 2021-03-20 LAB
BACTERIA SPEC AEROBE CULT: NORMAL
BACTERIA SPEC AEROBE CULT: NORMAL

## 2021-04-03 ENCOUNTER — HOSPITAL ENCOUNTER (EMERGENCY)
Facility: HOSPITAL | Age: 36
Discharge: HOME | End: 2021-04-03
Attending: EMERGENCY MEDICINE | Admitting: EMERGENCY MEDICINE

## 2021-04-03 ENCOUNTER — APPOINTMENT (OUTPATIENT)
Dept: CT IMAGING | Facility: HOSPITAL | Age: 36
End: 2021-04-03

## 2021-04-03 VITALS
WEIGHT: 293 LBS | OXYGEN SATURATION: 96 % | HEART RATE: 98 BPM | TEMPERATURE: 98.7 F | DIASTOLIC BLOOD PRESSURE: 99 MMHG | HEIGHT: 64 IN | BODY MASS INDEX: 50.02 KG/M2 | SYSTOLIC BLOOD PRESSURE: 150 MMHG | RESPIRATION RATE: 18 BRPM

## 2021-04-03 DIAGNOSIS — R16.0 LIVER MASS: ICD-10-CM

## 2021-04-03 DIAGNOSIS — R73.9 HYPERGLYCEMIA: ICD-10-CM

## 2021-04-03 DIAGNOSIS — N20.0 KIDNEY STONE: ICD-10-CM

## 2021-04-03 DIAGNOSIS — A49.9 BACTERIAL UTI: Primary | ICD-10-CM

## 2021-04-03 DIAGNOSIS — C56.9 MALIGNANT NEOPLASM OF OVARY, UNSPECIFIED LATERALITY (HCC): ICD-10-CM

## 2021-04-03 DIAGNOSIS — N39.0 BACTERIAL UTI: Primary | ICD-10-CM

## 2021-04-03 LAB
ALBUMIN SERPL-MCNC: 3.97 G/DL (ref 3.5–5.2)
ALBUMIN/GLOB SERPL: 1.2 G/DL
ALP SERPL-CCNC: 76 U/L (ref 39–117)
ALT SERPL W P-5'-P-CCNC: 31 U/L (ref 1–33)
ANION GAP SERPL CALCULATED.3IONS-SCNC: 13.3 MMOL/L (ref 5–15)
AST SERPL-CCNC: 38 U/L (ref 1–32)
BACTERIA UR QL AUTO: ABNORMAL /HPF
BILIRUB SERPL-MCNC: 0.4 MG/DL (ref 0–1.2)
BILIRUB UR QL STRIP: NEGATIVE
BUN SERPL-MCNC: 12 MG/DL (ref 6–20)
BUN/CREAT SERPL: 18.2 (ref 7–25)
CALCIUM SPEC-SCNC: 9.5 MG/DL (ref 8.6–10.5)
CHLORIDE SERPL-SCNC: 96 MMOL/L (ref 98–107)
CLARITY UR: ABNORMAL
CO2 SERPL-SCNC: 23.7 MMOL/L (ref 22–29)
COLOR UR: ABNORMAL
CREAT SERPL-MCNC: 0.66 MG/DL (ref 0.57–1)
CRP SERPL-MCNC: 1.79 MG/DL (ref 0–0.5)
DEPRECATED RDW RBC AUTO: 47.7 FL (ref 37–54)
EOSINOPHIL # BLD MANUAL: 0.13 10*3/MM3 (ref 0–0.4)
EOSINOPHIL NFR BLD MANUAL: 3 % (ref 0.3–6.2)
ERYTHROCYTE [DISTWIDTH] IN BLOOD BY AUTOMATED COUNT: 15.1 % (ref 12.3–15.4)
ERYTHROCYTE [SEDIMENTATION RATE] IN BLOOD: 42 MM/HR (ref 0–20)
GFR SERPL CREATININE-BSD FRML MDRD: 102 ML/MIN/1.73
GLOBULIN UR ELPH-MCNC: 3.4 GM/DL
GLUCOSE SERPL-MCNC: 337 MG/DL (ref 65–99)
GLUCOSE UR STRIP-MCNC: ABNORMAL MG/DL
HCT VFR BLD AUTO: 34.6 % (ref 34–46.6)
HGB BLD-MCNC: 11.8 G/DL (ref 12–15.9)
HGB UR QL STRIP.AUTO: ABNORMAL
HYALINE CASTS UR QL AUTO: ABNORMAL /LPF
KETONES UR QL STRIP: NEGATIVE
LEUKOCYTE ESTERASE UR QL STRIP.AUTO: ABNORMAL
LYMPHOCYTES # BLD MANUAL: 1.14 10*3/MM3 (ref 0.7–3.1)
LYMPHOCYTES NFR BLD MANUAL: 10 % (ref 5–12)
LYMPHOCYTES NFR BLD MANUAL: 26 % (ref 19.6–45.3)
MCH RBC QN AUTO: 29.6 PG (ref 26.6–33)
MCHC RBC AUTO-ENTMCNC: 34.1 G/DL (ref 31.5–35.7)
MCV RBC AUTO: 86.7 FL (ref 79–97)
MONOCYTES # BLD AUTO: 0.44 10*3/MM3 (ref 0.1–0.9)
NEUTROPHILS # BLD AUTO: 2.68 10*3/MM3 (ref 1.7–7)
NEUTROPHILS NFR BLD MANUAL: 58 % (ref 42.7–76)
NEUTS BAND NFR BLD MANUAL: 3 % (ref 0–5)
NITRITE UR QL STRIP: NEGATIVE
PH UR STRIP.AUTO: 6 [PH] (ref 5–8)
PLAT MORPH BLD: NORMAL
PLATELET # BLD AUTO: 206 10*3/MM3 (ref 140–450)
PMV BLD AUTO: 9.2 FL (ref 6–12)
POTASSIUM SERPL-SCNC: 4.3 MMOL/L (ref 3.5–5.2)
PROT SERPL-MCNC: 7.4 G/DL (ref 6–8.5)
PROT UR QL STRIP: ABNORMAL
RBC # BLD AUTO: 3.99 10*6/MM3 (ref 3.77–5.28)
RBC # UR: ABNORMAL /HPF
RBC MORPH BLD: NORMAL
REF LAB TEST METHOD: ABNORMAL
SODIUM SERPL-SCNC: 133 MMOL/L (ref 136–145)
SP GR UR STRIP: 1.02 (ref 1–1.03)
SQUAMOUS #/AREA URNS HPF: ABNORMAL /HPF
UROBILINOGEN UR QL STRIP: ABNORMAL
WBC # BLD AUTO: 4.39 10*3/MM3 (ref 3.4–10.8)
WBC UR QL AUTO: ABNORMAL /HPF

## 2021-04-03 PROCEDURE — 85007 BL SMEAR W/DIFF WBC COUNT: CPT | Performed by: EMERGENCY MEDICINE

## 2021-04-03 PROCEDURE — 80053 COMPREHEN METABOLIC PANEL: CPT | Performed by: EMERGENCY MEDICINE

## 2021-04-03 PROCEDURE — 99284 EMERGENCY DEPT VISIT MOD MDM: CPT

## 2021-04-03 PROCEDURE — 85025 COMPLETE CBC W/AUTO DIFF WBC: CPT | Performed by: EMERGENCY MEDICINE

## 2021-04-03 PROCEDURE — 36415 COLL VENOUS BLD VENIPUNCTURE: CPT

## 2021-04-03 PROCEDURE — 63710000001 PROMETHAZINE PER 25 MG: Performed by: EMERGENCY MEDICINE

## 2021-04-03 PROCEDURE — 87086 URINE CULTURE/COLONY COUNT: CPT | Performed by: EMERGENCY MEDICINE

## 2021-04-03 PROCEDURE — 85652 RBC SED RATE AUTOMATED: CPT | Performed by: EMERGENCY MEDICINE

## 2021-04-03 PROCEDURE — 81001 URINALYSIS AUTO W/SCOPE: CPT | Performed by: EMERGENCY MEDICINE

## 2021-04-03 PROCEDURE — 86140 C-REACTIVE PROTEIN: CPT | Performed by: EMERGENCY MEDICINE

## 2021-04-03 RX ORDER — NITROFURANTOIN 25; 75 MG/1; MG/1
100 CAPSULE ORAL 2 TIMES DAILY
Qty: 20 CAPSULE | Refills: 0 | OUTPATIENT
Start: 2021-04-03 | End: 2021-05-15

## 2021-04-03 RX ORDER — CLONIDINE HYDROCHLORIDE 0.1 MG/1
0.2 TABLET ORAL ONCE
Status: COMPLETED | OUTPATIENT
Start: 2021-04-03 | End: 2021-04-03

## 2021-04-03 RX ORDER — HYDROCODONE BITARTRATE AND ACETAMINOPHEN 5; 325 MG/1; MG/1
1 TABLET ORAL EVERY 6 HOURS PRN
Qty: 10 TABLET | Refills: 0 | OUTPATIENT
Start: 2021-04-03 | End: 2021-05-15

## 2021-04-03 RX ORDER — PROMETHAZINE HYDROCHLORIDE 25 MG/1
25 TABLET ORAL EVERY 6 HOURS PRN
Qty: 30 TABLET | Refills: 0 | Status: ON HOLD | OUTPATIENT
Start: 2021-04-03 | End: 2021-10-01

## 2021-04-03 RX ORDER — OXYCODONE HYDROCHLORIDE AND ACETAMINOPHEN 5; 325 MG/1; MG/1
1 TABLET ORAL ONCE
Status: COMPLETED | OUTPATIENT
Start: 2021-04-03 | End: 2021-04-03

## 2021-04-03 RX ORDER — PROMETHAZINE HYDROCHLORIDE 25 MG/1
25 TABLET ORAL ONCE
Status: COMPLETED | OUTPATIENT
Start: 2021-04-03 | End: 2021-04-03

## 2021-04-03 RX ADMIN — CLONIDINE HYDROCHLORIDE 0.2 MG: 0.1 TABLET ORAL at 03:06

## 2021-04-03 RX ADMIN — PROMETHAZINE HYDROCHLORIDE 25 MG: 25 TABLET ORAL at 02:58

## 2021-04-03 RX ADMIN — OXYCODONE HYDROCHLORIDE AND ACETAMINOPHEN 1 TABLET: 5; 325 TABLET ORAL at 02:58

## 2021-04-03 RX ADMIN — OXYCODONE HYDROCHLORIDE AND ACETAMINOPHEN 1 TABLET: 5; 325 TABLET ORAL at 03:06

## 2021-04-04 LAB — BACTERIA SPEC AEROBE CULT: NORMAL

## 2021-04-08 NOTE — ED PROVIDER NOTES
Subjective     History provided by:  Patient   used: No    Abdominal Pain  Pain location:  L flank and LLQ  Pain quality: aching, cramping and dull    Pain radiates to:  L flank and LLQ  Pain severity:  Mild (Patient rates her pain is a 5/10 on the pain severity scale.)  Onset quality:  Gradual  Timing:  Constant  Progression:  Unchanged  Chronicity:  Chronic  Context: not alcohol use, not awakening from sleep, not diet changes, not eating, not laxative use, not medication withdrawal, not previous surgeries, not recent illness, not recent sexual activity, not recent travel, not retching, not sick contacts, not suspicious food intake and not trauma    Relieved by:  Nothing  Worsened by:  Nothing  Ineffective treatments:  None tried  Associated symptoms: no anorexia, no belching, no chest pain, no chills, no constipation, no cough, no diarrhea, no dysuria, no fatigue, no fever, no flatus, no hematemesis, no hematochezia, no hematuria, no melena, no nausea, no shortness of breath, no sore throat, no vaginal bleeding, no vaginal discharge and no vomiting    Risk factors: obesity    Risk factors: no alcohol abuse, no aspirin use, not elderly, has not had multiple surgeries, no NSAID use, not pregnant and no recent hospitalization        Review of Systems   Constitutional: Negative for activity change, appetite change, chills, diaphoresis, fatigue and fever.   HENT: Negative for congestion, ear pain and sore throat.    Eyes: Negative for redness.   Respiratory: Negative for cough, chest tightness, shortness of breath and wheezing.    Cardiovascular: Negative for chest pain, palpitations and leg swelling.   Gastrointestinal: Positive for abdominal pain. Negative for anorexia, constipation, diarrhea, flatus, hematemesis, hematochezia, melena, nausea and vomiting.   Genitourinary: Negative for dysuria, hematuria, urgency, vaginal bleeding and vaginal discharge.   Musculoskeletal: Negative for arthralgias,  back pain, myalgias and neck pain.   Skin: Negative for pallor, rash and wound.   Neurological: Negative for dizziness, speech difficulty, weakness and headaches.   Psychiatric/Behavioral: Negative for agitation, behavioral problems, confusion and decreased concentration.   All other systems reviewed and are negative.      Past Medical History:   Diagnosis Date   • Abnormal ECG    • Anemia    • Anxiety    • Asthma    • Depression    • Diabetes mellitus (CMS/HCC)    • DVT (deep venous thrombosis) (CMS/HCC)    • Factor 5 Leiden mutation, heterozygous (CMS/HCC)    • Fibroid    • GERD (gastroesophageal reflux disease)    • Gestational diabetes    • Gout    • Hyperlipidemia    • Hypothyroid    • Kidney stone    • Migraines    • Neuropathy    • Ovarian cyst    • PE (pulmonary embolism)    • Polycystic ovary syndrome    • Preeclampsia    • Rh incompatibility    • Urinary tract infection    • Varicella        Allergies   Allergen Reactions   • Amoxicillin Hives   • Haldol [Haloperidol] Hives   • Penicillins Hives   • Toradol [Ketorolac Tromethamine] Hives   • Tramadol Swelling       Past Surgical History:   Procedure Laterality Date   • CARDIAC CATHETERIZATION     •  SECTION     • CHOLECYSTECTOMY     • COLONOSCOPY         Family History   Problem Relation Age of Onset   • No Known Problems Mother    • No Known Problems Father    • No Known Problems Sister    • No Known Problems Brother    • No Known Problems Son    • No Known Problems Daughter    • No Known Problems Maternal Grandmother    • No Known Problems Maternal Grandfather    • No Known Problems Paternal Grandmother    • No Known Problems Paternal Grandfather    • No Known Problems Cousin    • Rheum arthritis Neg Hx    • Osteoarthritis Neg Hx    • Asthma Neg Hx    • Diabetes Neg Hx    • Heart failure Neg Hx    • Hyperlipidemia Neg Hx    • Hypertension Neg Hx    • Migraines Neg Hx    • Rashes / Skin problems Neg Hx    • Seizures Neg Hx    • Stroke Neg Hx    •  "Thyroid disease Neg Hx        Social History     Socioeconomic History   • Marital status:      Spouse name: Not on file   • Number of children: Not on file   • Years of education: Not on file   • Highest education level: Not on file   Tobacco Use   • Smoking status: Never Smoker   • Smokeless tobacco: Never Used   Substance and Sexual Activity   • Alcohol use: No   • Drug use: Never     Comment: Pt has track marks on right arm. Pt states \"It's from my INR being drawn.\"    • Sexual activity: Defer           Objective   Physical Exam  Vitals and nursing note reviewed.   Constitutional:       General: She is not in acute distress.     Appearance: Normal appearance. She is well-developed. She is not toxic-appearing or diaphoretic.   HENT:      Head: Normocephalic and atraumatic.      Right Ear: External ear normal.      Left Ear: External ear normal.      Nose: Nose normal.      Mouth/Throat:      Pharynx: No oropharyngeal exudate.      Tonsils: No tonsillar exudate.   Eyes:      General: Lids are normal.      Conjunctiva/sclera: Conjunctivae normal.      Pupils: Pupils are equal, round, and reactive to light.   Neck:      Thyroid: No thyromegaly.   Cardiovascular:      Rate and Rhythm: Normal rate and regular rhythm.      Pulses: Normal pulses.      Heart sounds: Normal heart sounds, S1 normal and S2 normal.   Pulmonary:      Effort: Pulmonary effort is normal. No tachypnea or respiratory distress.      Breath sounds: Normal breath sounds. No decreased breath sounds, wheezing or rales.   Chest:      Chest wall: No tenderness.   Abdominal:      General: Bowel sounds are normal. There is no distension.      Palpations: Abdomen is soft.      Tenderness: There is abdominal tenderness. There is left CVA tenderness. There is no guarding or rebound.   Musculoskeletal:         General: No tenderness or deformity. Normal range of motion.      Cervical back: Full passive range of motion without pain, normal range of " motion and neck supple.   Lymphadenopathy:      Cervical: No cervical adenopathy.   Skin:     General: Skin is warm and dry.      Coloration: Skin is not pale.      Findings: No erythema or rash.   Neurological:      Mental Status: She is alert and oriented to person, place, and time.      GCS: GCS eye subscore is 4. GCS verbal subscore is 5. GCS motor subscore is 6.      Cranial Nerves: No cranial nerve deficit.      Sensory: No sensory deficit.   Psychiatric:         Speech: Speech normal.         Behavior: Behavior normal.         Thought Content: Thought content normal.         Judgment: Judgment normal.         Procedures           ED Course                                           MDM  Number of Diagnoses or Management Options  Bacterial UTI: new and requires workup  Hyperglycemia: new and requires workup  Kidney stone: new and requires workup  Liver mass: new and requires workup  Malignant neoplasm of ovary, unspecified laterality (CMS/HCC): new and requires workup     Amount and/or Complexity of Data Reviewed  Clinical lab tests: reviewed and ordered  Tests in the radiology section of CPT®: ordered and reviewed  Tests in the medicine section of CPT®: ordered and reviewed  Review and summarize past medical records: yes  Independent visualization of images, tracings, or specimens: yes    Risk of Complications, Morbidity, and/or Mortality  Presenting problems: moderate  Diagnostic procedures: moderate  Management options: moderate    Patient Progress  Patient progress: stable      Final diagnoses:   Bacterial UTI   Kidney stone   Malignant neoplasm of ovary, unspecified laterality (CMS/HCC)   Hyperglycemia   Liver mass       ED Disposition  ED Disposition     ED Disposition Condition Comment    Discharge            Milan Motley MD  [address] [place] [address]  [facility] [address]  [phone]    Schedule an appointment as soon as possible for a visit in 1 day  REEVALUATE         Medication List      New  Prescriptions    nitrofurantoin (macrocrystal-monohydrate) 100 MG capsule  Commonly known as: MACROBID  Take 1 capsule by mouth 2 (Two) Times a Day.        Changed    * HYDROcodone-acetaminophen  MG per tablet  Commonly known as: NORCO  Take 1 tablet by mouth Every 6 (Six) Hours As Needed for Moderate Pain .  What changed: Another medication with the same name was added. Make sure you understand how and when to take each.     * HYDROcodone-acetaminophen 5-325 MG per tablet  Commonly known as: NORCO  Take 1 tablet by mouth Every 6 (Six) Hours As Needed for Severe Pain .  What changed: You were already taking a medication with the same name, and this prescription was added. Make sure you understand how and when to take each.     * promethazine 25 MG tablet  Commonly known as: PHENERGAN  Take 1 tablet by mouth Every 6 (Six) Hours As Needed for Nausea or Vomiting.  What changed: Another medication with the same name was added. Make sure you understand how and when to take each.     * promethazine 25 MG tablet  Commonly known as: PHENERGAN  Take 1 tablet by mouth Every 6 (Six) Hours As Needed for Vomiting.  What changed: You were already taking a medication with the same name, and this prescription was added. Make sure you understand how and when to take each.         * This list has 4 medication(s) that are the same as other medications prescribed for you. Read the directions carefully, and ask your doctor or other care provider to review them with you.               Where to Get Your Medications      You can get these medications from any pharmacy    Bring a paper prescription for each of these medications  · HYDROcodone-acetaminophen 5-325 MG per tablet  · nitrofurantoin (macrocrystal-monohydrate) 100 MG capsule  · promethazine 25 MG tablet          Martir Razo MD  04/08/21 0123

## 2021-05-14 PROCEDURE — 99284 EMERGENCY DEPT VISIT MOD MDM: CPT

## 2021-05-15 ENCOUNTER — APPOINTMENT (OUTPATIENT)
Dept: GENERAL RADIOLOGY | Facility: HOSPITAL | Age: 36
End: 2021-05-15

## 2021-05-15 ENCOUNTER — HOSPITAL ENCOUNTER (EMERGENCY)
Facility: HOSPITAL | Age: 36
Discharge: LEFT BEFORE DISCHARGE | End: 2021-05-15
Attending: FAMILY MEDICINE | Admitting: FAMILY MEDICINE

## 2021-05-15 ENCOUNTER — APPOINTMENT (OUTPATIENT)
Dept: CT IMAGING | Facility: HOSPITAL | Age: 36
End: 2021-05-15

## 2021-05-15 VITALS
HEIGHT: 64 IN | RESPIRATION RATE: 18 BRPM | HEART RATE: 115 BPM | OXYGEN SATURATION: 95 % | DIASTOLIC BLOOD PRESSURE: 135 MMHG | TEMPERATURE: 99 F | SYSTOLIC BLOOD PRESSURE: 194 MMHG | BODY MASS INDEX: 50.02 KG/M2 | WEIGHT: 293 LBS

## 2021-05-15 DIAGNOSIS — N39.0 BACTERIAL UTI: ICD-10-CM

## 2021-05-15 DIAGNOSIS — N12 PYELONEPHRITIS: Primary | ICD-10-CM

## 2021-05-15 DIAGNOSIS — I10 HYPERTENSION, UNSPECIFIED TYPE: ICD-10-CM

## 2021-05-15 DIAGNOSIS — A49.9 BACTERIAL UTI: ICD-10-CM

## 2021-05-15 DIAGNOSIS — N20.0 KIDNEY STONE: ICD-10-CM

## 2021-05-15 LAB
ALBUMIN SERPL-MCNC: 3.9 G/DL (ref 3.5–5.2)
ALBUMIN/GLOB SERPL: 0.7 G/DL
ALP SERPL-CCNC: 110 U/L (ref 39–117)
ALT SERPL W P-5'-P-CCNC: 31 U/L (ref 1–33)
ANION GAP SERPL CALCULATED.3IONS-SCNC: 15.7 MMOL/L (ref 5–15)
AST SERPL-CCNC: 54 U/L (ref 1–32)
BACTERIA UR QL AUTO: ABNORMAL /HPF
BILIRUB SERPL-MCNC: 0.2 MG/DL (ref 0–1.2)
BILIRUB UR QL STRIP: NEGATIVE
BUN SERPL-MCNC: 13 MG/DL (ref 6–20)
BUN/CREAT SERPL: 14.8 (ref 7–25)
CALCIUM SPEC-SCNC: 9.7 MG/DL (ref 8.6–10.5)
CHLORIDE SERPL-SCNC: 95 MMOL/L (ref 98–107)
CLARITY UR: ABNORMAL
CO2 SERPL-SCNC: 23.3 MMOL/L (ref 22–29)
COLOR UR: YELLOW
CREAT SERPL-MCNC: 0.88 MG/DL (ref 0.57–1)
D-LACTATE SERPL-SCNC: 2.3 MMOL/L (ref 0.5–2)
D-LACTATE SERPL-SCNC: 2.6 MMOL/L (ref 0.5–2)
DEPRECATED RDW RBC AUTO: 41.5 FL (ref 37–54)
ERYTHROCYTE [DISTWIDTH] IN BLOOD BY AUTOMATED COUNT: 13.3 % (ref 12.3–15.4)
FLUAV RNA RESP QL NAA+PROBE: NOT DETECTED
FLUBV RNA RESP QL NAA+PROBE: NOT DETECTED
GFR SERPL CREATININE-BSD FRML MDRD: 73 ML/MIN/1.73
GLOBULIN UR ELPH-MCNC: 5.5 GM/DL
GLUCOSE SERPL-MCNC: 343 MG/DL (ref 65–99)
GLUCOSE UR STRIP-MCNC: ABNORMAL MG/DL
HCT VFR BLD AUTO: 36.2 % (ref 34–46.6)
HGB BLD-MCNC: 12.1 G/DL (ref 12–15.9)
HGB UR QL STRIP.AUTO: NEGATIVE
HOLD SPECIMEN: NORMAL
HOLD SPECIMEN: NORMAL
HYALINE CASTS UR QL AUTO: ABNORMAL /LPF
INR PPP: 1.01 (ref 0.9–1.1)
KETONES UR QL STRIP: NEGATIVE
LEUKOCYTE ESTERASE UR QL STRIP.AUTO: ABNORMAL
LIPASE SERPL-CCNC: 38 U/L (ref 13–60)
LYMPHOCYTES # BLD MANUAL: 1.9 10*3/MM3 (ref 0.7–3.1)
LYMPHOCYTES NFR BLD MANUAL: 36 % (ref 19.6–45.3)
LYMPHOCYTES NFR BLD MANUAL: 4 % (ref 5–12)
MCH RBC QN AUTO: 28.7 PG (ref 26.6–33)
MCHC RBC AUTO-ENTMCNC: 33.4 G/DL (ref 31.5–35.7)
MCV RBC AUTO: 85.8 FL (ref 79–97)
MONOCYTES # BLD AUTO: 0.21 10*3/MM3 (ref 0.1–0.9)
NEUTROPHILS # BLD AUTO: 3.17 10*3/MM3 (ref 1.7–7)
NEUTROPHILS NFR BLD MANUAL: 60 % (ref 42.7–76)
NITRITE UR QL STRIP: NEGATIVE
PH UR STRIP.AUTO: <=5 [PH] (ref 5–8)
PLAT MORPH BLD: NORMAL
PLATELET # BLD AUTO: 270 10*3/MM3 (ref 140–450)
PMV BLD AUTO: 8.7 FL (ref 6–12)
POTASSIUM SERPL-SCNC: 4.1 MMOL/L (ref 3.5–5.2)
PROT SERPL-MCNC: 9.4 G/DL (ref 6–8.5)
PROT UR QL STRIP: ABNORMAL
PROTHROMBIN TIME: 13.1 SECONDS (ref 11.9–14.1)
QT INTERVAL: 326 MS
QTC INTERVAL: 460 MS
RBC # BLD AUTO: 4.22 10*6/MM3 (ref 3.77–5.28)
RBC # UR: ABNORMAL /HPF
RBC MORPH BLD: NORMAL
REF LAB TEST METHOD: ABNORMAL
SARS-COV-2 RNA RESP QL NAA+PROBE: NOT DETECTED
SCAN SLIDE: NORMAL
SODIUM SERPL-SCNC: 134 MMOL/L (ref 136–145)
SP GR UR STRIP: >1.03 (ref 1–1.03)
SQUAMOUS #/AREA URNS HPF: ABNORMAL /HPF
TROPONIN T SERPL-MCNC: <0.01 NG/ML (ref 0–0.03)
URATE CRY URNS QL MICRO: ABNORMAL /HPF
UROBILINOGEN UR QL STRIP: ABNORMAL
WBC # BLD AUTO: 5.28 10*3/MM3 (ref 3.4–10.8)
WBC UR QL AUTO: ABNORMAL /HPF
WHOLE BLOOD HOLD SPECIMEN: NORMAL
WHOLE BLOOD HOLD SPECIMEN: NORMAL
YEAST URNS QL MICRO: ABNORMAL /HPF

## 2021-05-15 PROCEDURE — 36415 COLL VENOUS BLD VENIPUNCTURE: CPT

## 2021-05-15 PROCEDURE — 84484 ASSAY OF TROPONIN QUANT: CPT | Performed by: FAMILY MEDICINE

## 2021-05-15 PROCEDURE — 25010000002 IOPAMIDOL 61 % SOLUTION: Performed by: FAMILY MEDICINE

## 2021-05-15 PROCEDURE — 85025 COMPLETE CBC W/AUTO DIFF WBC: CPT | Performed by: FAMILY MEDICINE

## 2021-05-15 PROCEDURE — 96376 TX/PRO/DX INJ SAME DRUG ADON: CPT

## 2021-05-15 PROCEDURE — 83690 ASSAY OF LIPASE: CPT | Performed by: FAMILY MEDICINE

## 2021-05-15 PROCEDURE — 87636 SARSCOV2 & INF A&B AMP PRB: CPT | Performed by: FAMILY MEDICINE

## 2021-05-15 PROCEDURE — 80053 COMPREHEN METABOLIC PANEL: CPT | Performed by: FAMILY MEDICINE

## 2021-05-15 PROCEDURE — 85610 PROTHROMBIN TIME: CPT | Performed by: FAMILY MEDICINE

## 2021-05-15 PROCEDURE — 85007 BL SMEAR W/DIFF WBC COUNT: CPT | Performed by: FAMILY MEDICINE

## 2021-05-15 PROCEDURE — 25010000002 VANCOMYCIN: Performed by: FAMILY MEDICINE

## 2021-05-15 PROCEDURE — 87040 BLOOD CULTURE FOR BACTERIA: CPT | Performed by: FAMILY MEDICINE

## 2021-05-15 PROCEDURE — 96368 THER/DIAG CONCURRENT INF: CPT

## 2021-05-15 PROCEDURE — 74177 CT ABD & PELVIS W/CONTRAST: CPT

## 2021-05-15 PROCEDURE — 81001 URINALYSIS AUTO W/SCOPE: CPT | Performed by: FAMILY MEDICINE

## 2021-05-15 PROCEDURE — 25010000002 HYDROMORPHONE 1 MG/ML SOLUTION: Performed by: FAMILY MEDICINE

## 2021-05-15 PROCEDURE — 93005 ELECTROCARDIOGRAM TRACING: CPT | Performed by: FAMILY MEDICINE

## 2021-05-15 PROCEDURE — 96367 TX/PROPH/DG ADDL SEQ IV INF: CPT

## 2021-05-15 PROCEDURE — 71045 X-RAY EXAM CHEST 1 VIEW: CPT

## 2021-05-15 PROCEDURE — 25010000002 HYDROMORPHONE PER 4 MG: Performed by: FAMILY MEDICINE

## 2021-05-15 PROCEDURE — 96366 THER/PROPH/DIAG IV INF ADDON: CPT

## 2021-05-15 PROCEDURE — 96365 THER/PROPH/DIAG IV INF INIT: CPT

## 2021-05-15 PROCEDURE — 96375 TX/PRO/DX INJ NEW DRUG ADDON: CPT

## 2021-05-15 PROCEDURE — 83605 ASSAY OF LACTIC ACID: CPT | Performed by: FAMILY MEDICINE

## 2021-05-15 PROCEDURE — 25010000002 HYDRALAZINE PER 20 MG: Performed by: FAMILY MEDICINE

## 2021-05-15 RX ORDER — HYDROMORPHONE HYDROCHLORIDE 1 MG/ML
0.5 INJECTION, SOLUTION INTRAMUSCULAR; INTRAVENOUS; SUBCUTANEOUS ONCE
Status: COMPLETED | OUTPATIENT
Start: 2021-05-15 | End: 2021-05-15

## 2021-05-15 RX ORDER — HYDROCODONE BITARTRATE AND ACETAMINOPHEN 5; 325 MG/1; MG/1
1 TABLET ORAL EVERY 6 HOURS PRN
Qty: 6 TABLET | Refills: 0 | Status: ON HOLD | OUTPATIENT
Start: 2021-05-15 | End: 2021-10-01

## 2021-05-15 RX ORDER — METOPROLOL TARTRATE 5 MG/5ML
5 INJECTION INTRAVENOUS ONCE
Status: COMPLETED | OUTPATIENT
Start: 2021-05-15 | End: 2021-05-15

## 2021-05-15 RX ORDER — HYDRALAZINE HYDROCHLORIDE 20 MG/ML
20 INJECTION INTRAMUSCULAR; INTRAVENOUS ONCE
Status: COMPLETED | OUTPATIENT
Start: 2021-05-15 | End: 2021-05-15

## 2021-05-15 RX ORDER — DOXYCYCLINE 100 MG/1
100 CAPSULE ORAL 2 TIMES DAILY
Qty: 14 CAPSULE | Refills: 0 | Status: ON HOLD | OUTPATIENT
Start: 2021-05-15 | End: 2021-10-01

## 2021-05-15 RX ORDER — SODIUM CHLORIDE 0.9 % (FLUSH) 0.9 %
10 SYRINGE (ML) INJECTION AS NEEDED
Status: DISCONTINUED | OUTPATIENT
Start: 2021-05-15 | End: 2021-05-15 | Stop reason: HOSPADM

## 2021-05-15 RX ORDER — HYDROCODONE BITARTRATE AND ACETAMINOPHEN 5; 325 MG/1; MG/1
1 TABLET ORAL EVERY 6 HOURS PRN
Qty: 6 TABLET | Refills: 0 | Status: SHIPPED | OUTPATIENT
Start: 2021-05-15 | End: 2021-05-15 | Stop reason: SDUPTHER

## 2021-05-15 RX ADMIN — HYDROMORPHONE HYDROCHLORIDE 0.5 MG: 1 INJECTION, SOLUTION INTRAMUSCULAR; INTRAVENOUS; SUBCUTANEOUS at 05:40

## 2021-05-15 RX ADMIN — METRONIDAZOLE 500 MG: 500 INJECTION, SOLUTION INTRAVENOUS at 03:51

## 2021-05-15 RX ADMIN — HYDROMORPHONE HYDROCHLORIDE 0.5 MG: 1 INJECTION, SOLUTION INTRAMUSCULAR; INTRAVENOUS; SUBCUTANEOUS at 07:16

## 2021-05-15 RX ADMIN — IOPAMIDOL 100 ML: 612 INJECTION, SOLUTION INTRAVENOUS at 04:49

## 2021-05-15 RX ADMIN — HYDRALAZINE HYDROCHLORIDE 20 MG: 20 INJECTION INTRAMUSCULAR; INTRAVENOUS at 04:49

## 2021-05-15 RX ADMIN — HYDROMORPHONE HYDROCHLORIDE 0.5 MG: 1 INJECTION, SOLUTION INTRAMUSCULAR; INTRAVENOUS; SUBCUTANEOUS at 04:10

## 2021-05-15 RX ADMIN — METOPROLOL TARTRATE 5 MG: 1 INJECTION, SOLUTION INTRAVENOUS at 03:50

## 2021-05-15 RX ADMIN — SODIUM CHLORIDE 2 G: 900 INJECTION INTRAVENOUS at 04:51

## 2021-05-15 RX ADMIN — VANCOMYCIN HYDROCHLORIDE 2000 MG: 1 INJECTION, POWDER, LYOPHILIZED, FOR SOLUTION INTRAVENOUS at 04:11

## 2021-05-15 NOTE — ED PROVIDER NOTES
Subjective   36-year-old female with a history diabetes factor V Leiden mutation ovarian cancer presents the emergency room with left side abdominal pain.  Patient reports she had left abdominal pain for the past week.  She states she had a fever 102 at home on Monday but has since not had a fever.  She states she had nausea vomiting.  She reports that she currently is not receiving chemotherapy.  She states she is scheduled to undergo hysterectomy in the future.  She denies sick contacts.  She denies diarrhea.  She denies chest pain or shortness of breath she denies cough congestion.  She denies any known sick contacts.  She has been taking her all of her home medications however she states that her symptoms persist.      Abdominal Pain  Pain location:  L flank  Pain quality: sharp    Pain radiates to:  LUQ and LLQ  Pain severity:  Moderate  Duration:  1 week  Timing:  Constant  Progression:  Unchanged  Relieved by:  Nothing  Worsened by:  Nothing  Associated symptoms: fever, nausea and vomiting    Associated symptoms: no chest pain, no chills, no constipation, no cough, no dysuria, no fatigue, no flatus and no shortness of breath        Review of Systems   Constitutional: Positive for fever. Negative for chills and fatigue.   Respiratory: Negative for cough and shortness of breath.    Cardiovascular: Negative for chest pain.   Gastrointestinal: Positive for abdominal pain, nausea and vomiting. Negative for constipation and flatus.   Genitourinary: Negative for dysuria.   All other systems reviewed and are negative.      Past Medical History:   Diagnosis Date   • Abnormal ECG    • Anemia    • Anxiety    • Asthma    • Depression    • Diabetes mellitus (CMS/HCC)    • DVT (deep venous thrombosis) (CMS/HCC)    • Factor 5 Leiden mutation, heterozygous (CMS/HCC)    • Fibroid    • GERD (gastroesophageal reflux disease)    • Gestational diabetes    • Gout    • Hyperlipidemia    • Hypothyroid    • Kidney stone    •  "Migraines    • Neuropathy    • Ovarian cyst    • PE (pulmonary embolism)    • Polycystic ovary syndrome    • Preeclampsia    • Rh incompatibility    • Urinary tract infection    • Varicella        Allergies   Allergen Reactions   • Amoxicillin Hives   • Haldol [Haloperidol] Hives   • Penicillins Hives   • Toradol [Ketorolac Tromethamine] Hives   • Tramadol Swelling       Past Surgical History:   Procedure Laterality Date   • CARDIAC CATHETERIZATION     •  SECTION     • CHOLECYSTECTOMY     • COLONOSCOPY         Family History   Problem Relation Age of Onset   • No Known Problems Mother    • No Known Problems Father    • No Known Problems Sister    • No Known Problems Brother    • No Known Problems Son    • No Known Problems Daughter    • No Known Problems Maternal Grandmother    • No Known Problems Maternal Grandfather    • No Known Problems Paternal Grandmother    • No Known Problems Paternal Grandfather    • No Known Problems Cousin    • Rheum arthritis Neg Hx    • Osteoarthritis Neg Hx    • Asthma Neg Hx    • Diabetes Neg Hx    • Heart failure Neg Hx    • Hyperlipidemia Neg Hx    • Hypertension Neg Hx    • Migraines Neg Hx    • Rashes / Skin problems Neg Hx    • Seizures Neg Hx    • Stroke Neg Hx    • Thyroid disease Neg Hx        Social History     Socioeconomic History   • Marital status:      Spouse name: Not on file   • Number of children: Not on file   • Years of education: Not on file   • Highest education level: Not on file   Tobacco Use   • Smoking status: Never Smoker   • Smokeless tobacco: Never Used   Vaping Use   • Vaping Use: Never used   Substance and Sexual Activity   • Alcohol use: No   • Drug use: Never     Comment: Pt has track marks on right arm. Pt states \"It's from my INR being drawn.\"    • Sexual activity: Defer           Objective   Physical Exam  Vitals and nursing note reviewed.   Constitutional:       Appearance: She is not ill-appearing.   HENT:      Head: Normocephalic " and atraumatic.      Mouth/Throat:      Mouth: Mucous membranes are moist.   Eyes:      Pupils: Pupils are equal, round, and reactive to light.   Neck:      Comments: Short neck no nuchal rigidity  Cardiovascular:      Rate and Rhythm: Tachycardia present.      Heart sounds: No murmur heard.        Comments: 2+ radial pulses.  Pulmonary:      Effort: Pulmonary effort is normal.      Breath sounds: Normal breath sounds. No wheezing, rhonchi or rales.      Comments: Unlabored breathing   Abdominal:      General: Bowel sounds are normal.      Palpations: Abdomen is soft.      Comments: Large pannus unable appreciate organomegaly due to body habitus.  No guarding or rigidity.   Musculoskeletal:      Cervical back: Neck supple.      Right lower leg: No edema.      Left lower leg: No edema.   Skin:     General: Skin is warm.      Capillary Refill: Capillary refill takes less than 2 seconds.   Neurological:      General: No focal deficit present.      Mental Status: She is alert.   Psychiatric:         Mood and Affect: Mood normal.         Procedures           ED Course  ED Course as of May 15 0659   Sat May 15, 2021   0319 Patient is notably tachycardic as well as is noted be hypotensive we will give IV Lopressor.  Patient is reporting abdominal discomfort we will give IV antibiotics for empiric coverage    [BB]   0400 Patient CMP shows elevated glucose 343 patient is afebrile patient states she desires to go home.  Have discussed inability to diagnose fully evaluate patient without further labs and work-up.  Discussed risk of a serious infectious process.    [BB]   0401 Patient Covid flu negative troponin negative lipase negative coags unremarkable    [BB]   0402 After further discussion patient agreeable to stay    [BB]   0505 Chest x-ray is unremarkable    [BB]   0630 CT Abdomen Pelvis With Contrast    Result Date: 5/15/2021  1. Diminished enhancement in the left kidney with adjacent fat stranding concerning for acute  pyelonephritis. 2. Bilateral nonobstructing kidney stones. No ureteral stones, and no hydronephrosis. 3. Distended but otherwise normal urinary bladder. 4. Persistent hepatosplenomegaly with hepatic steatosis. 5. Cholecystectomy without biliary obstruction. 6. Normal appendix. No small bowel obstruction. Signer Name: Elian Del Rio MD  Signed: 5/15/2021 5:17 AM  Workstation Name: [facility]  [facility]    XR Chest AP    Result Date: 5/15/2021  No acute cardiopulmonary findings. Signer Name: Elian Del Rio MD  Signed: 5/15/2021 4:57 AM  Workstation Name: [facility]  [facility]        [BB]   0653 Patient declines admission to the hospital.  She states anesthesia check on her children have discussed risk of death and endorgan damage loss of lifestyle.      [BB]   0653 Patient verbalized understanding.  Patient has signed out AGAINST MEDICAL ADVICE.    [BB]      ED Course User Index  [BB] Tho Aparicio MD                                           [facility]    Final diagnoses:   Pyelonephritis   Hypertension, unspecified type       ED Disposition  ED Disposition     ED Disposition Condition Comment    [facility]            [facility]IENT CONNECTION - [place]  See Provider List  [place] [address]  [phone]  Call            Medication List      Changed    HYDROcodone-acetaminophen 5-325 MG per tablet  Commonly known as: NORCO  Take 1 tablet by mouth Every 6 (Six) Hours As Needed for Severe Pain .  What changed: Another medication with the same name was removed. Continue taking this medication, and follow the directions you see here.        Stop    nitrofurantoin (macrocrystal-monohydrate) 100 MG capsule  Commonly known as: MACROBID           Where to Get Your Medications      These medications were sent to [facility] [address] - [phone] [facility] [phone]   [address] [place] [id number]    Phone: [phone]    · HYDROcodone-acetaminophen 5-325 MG per tablet     You can get these medications from any pharmacy    Bring a paper prescription for each of these medications  · doxycycline 100 MG capsule          Tho Aparicio MD  05/15/21 0694

## 2021-05-15 NOTE — ED NOTES
"Pt states \"I'm sorry I had to throw a fit on you to get that doctor to give me what I wanted, but that's what you have to do around here to get what you need. This place cares so much about money, so all you gotta say is you is going to leave and you can get whatever you ask for. That's a shame they just won't give you pain medicine when you ask for it.\" DR. Aparicio made aware.      Cierra Kruse RN  05/15/21 3365    "

## 2021-05-15 NOTE — ED NOTES
Pt asking to lleave [facility], paperwork given to patient and she states she can't leave until she gets another dose of pain medication. Dr. Aparicio and day nurse Monie made aware.      Cierra Kruse, RN  05/15/21 0715

## 2021-05-20 LAB
BACTERIA SPEC AEROBE CULT: NORMAL
BACTERIA SPEC AEROBE CULT: NORMAL

## 2021-08-25 ENCOUNTER — HOSPITAL ENCOUNTER (EMERGENCY)
Facility: HOSPITAL | Age: 36
Discharge: LEFT BEFORE DISCHARGE | End: 2021-08-25
Admitting: EMERGENCY MEDICINE

## 2021-08-25 ENCOUNTER — APPOINTMENT (OUTPATIENT)
Dept: GENERAL RADIOLOGY | Facility: HOSPITAL | Age: 36
End: 2021-08-25

## 2021-08-25 ENCOUNTER — APPOINTMENT (OUTPATIENT)
Dept: CT IMAGING | Facility: HOSPITAL | Age: 36
End: 2021-08-25

## 2021-08-25 VITALS
RESPIRATION RATE: 16 BRPM | OXYGEN SATURATION: 95 % | HEART RATE: 124 BPM | HEIGHT: 64 IN | WEIGHT: 293 LBS | SYSTOLIC BLOOD PRESSURE: 172 MMHG | BODY MASS INDEX: 50.02 KG/M2 | DIASTOLIC BLOOD PRESSURE: 106 MMHG | TEMPERATURE: 98.7 F

## 2021-08-25 LAB
ALBUMIN SERPL-MCNC: 4.56 G/DL (ref 3.5–5.2)
ALBUMIN/GLOB SERPL: 1.1 G/DL
ALP SERPL-CCNC: 98 U/L (ref 39–117)
ALT SERPL W P-5'-P-CCNC: 33 U/L (ref 1–33)
ANION GAP SERPL CALCULATED.3IONS-SCNC: 19.6 MMOL/L (ref 5–15)
AST SERPL-CCNC: 44 U/L (ref 1–32)
BACTERIA UR QL AUTO: ABNORMAL /HPF
BILIRUB SERPL-MCNC: 0.4 MG/DL (ref 0–1.2)
BILIRUB UR QL STRIP: NEGATIVE
BUN SERPL-MCNC: 16 MG/DL (ref 6–20)
BUN/CREAT SERPL: 23.2 (ref 7–25)
CALCIUM SPEC-SCNC: 10.2 MG/DL (ref 8.6–10.5)
CHLORIDE SERPL-SCNC: 95 MMOL/L (ref 98–107)
CLARITY UR: ABNORMAL
CO2 SERPL-SCNC: 19.4 MMOL/L (ref 22–29)
COLOR UR: ABNORMAL
CREAT SERPL-MCNC: 0.69 MG/DL (ref 0.57–1)
CRP SERPL-MCNC: 2.24 MG/DL (ref 0–0.5)
DEPRECATED RDW RBC AUTO: 43.4 FL (ref 37–54)
EOSINOPHIL # BLD MANUAL: 0.06 10*3/MM3 (ref 0–0.4)
EOSINOPHIL NFR BLD MANUAL: 1 % (ref 0.3–6.2)
ERYTHROCYTE [DISTWIDTH] IN BLOOD BY AUTOMATED COUNT: 14.5 % (ref 12.3–15.4)
GFR SERPL CREATININE-BSD FRML MDRD: 97 ML/MIN/1.73
GLOBULIN UR ELPH-MCNC: 4.1 GM/DL
GLUCOSE SERPL-MCNC: 391 MG/DL (ref 65–99)
GLUCOSE UR STRIP-MCNC: ABNORMAL MG/DL
HCG SERPL QL: NEGATIVE
HCT VFR BLD AUTO: 40.4 % (ref 34–46.6)
HGB BLD-MCNC: 13.5 G/DL (ref 12–15.9)
HGB UR QL STRIP.AUTO: ABNORMAL
HOLD SPECIMEN: NORMAL
HOLD SPECIMEN: NORMAL
HYALINE CASTS UR QL AUTO: ABNORMAL /LPF
KETONES UR QL STRIP: ABNORMAL
LEUKOCYTE ESTERASE UR QL STRIP.AUTO: ABNORMAL
LYMPHOCYTES # BLD MANUAL: 1.85 10*3/MM3 (ref 0.7–3.1)
LYMPHOCYTES NFR BLD MANUAL: 30 % (ref 19.6–45.3)
LYMPHOCYTES NFR BLD MANUAL: 8 % (ref 5–12)
MCH RBC QN AUTO: 27.8 PG (ref 26.6–33)
MCHC RBC AUTO-ENTMCNC: 33.4 G/DL (ref 31.5–35.7)
MCV RBC AUTO: 83.1 FL (ref 79–97)
MONOCYTES # BLD AUTO: 0.49 10*3/MM3 (ref 0.1–0.9)
NEUTROPHILS # BLD AUTO: 3.76 10*3/MM3 (ref 1.7–7)
NEUTROPHILS NFR BLD MANUAL: 61 % (ref 42.7–76)
NITRITE UR QL STRIP: POSITIVE
PH UR STRIP.AUTO: 5.5 [PH] (ref 5–8)
PLAT MORPH BLD: NORMAL
PLATELET # BLD AUTO: 338 10*3/MM3 (ref 140–450)
PMV BLD AUTO: 9.2 FL (ref 6–12)
POTASSIUM SERPL-SCNC: 4.5 MMOL/L (ref 3.5–5.2)
PROT SERPL-MCNC: 8.7 G/DL (ref 6–8.5)
PROT UR QL STRIP: ABNORMAL
RBC # BLD AUTO: 4.86 10*6/MM3 (ref 3.77–5.28)
RBC # UR: ABNORMAL /HPF
RBC MORPH BLD: NORMAL
REF LAB TEST METHOD: ABNORMAL
SCAN SLIDE: NORMAL
SODIUM SERPL-SCNC: 134 MMOL/L (ref 136–145)
SP GR UR STRIP: 1.03 (ref 1–1.03)
SQUAMOUS #/AREA URNS HPF: ABNORMAL /HPF
UROBILINOGEN UR QL STRIP: ABNORMAL
WBC # BLD AUTO: 6.17 10*3/MM3 (ref 3.4–10.8)
WBC UR QL AUTO: ABNORMAL /HPF
WHOLE BLOOD HOLD SPECIMEN: NORMAL
WHOLE BLOOD HOLD SPECIMEN: NORMAL

## 2021-08-25 PROCEDURE — 84703 CHORIONIC GONADOTROPIN ASSAY: CPT | Performed by: EMERGENCY MEDICINE

## 2021-08-25 PROCEDURE — 71045 X-RAY EXAM CHEST 1 VIEW: CPT

## 2021-08-25 PROCEDURE — 86140 C-REACTIVE PROTEIN: CPT | Performed by: PHYSICIAN ASSISTANT

## 2021-08-25 PROCEDURE — 85025 COMPLETE CBC W/AUTO DIFF WBC: CPT | Performed by: PHYSICIAN ASSISTANT

## 2021-08-25 PROCEDURE — 87086 URINE CULTURE/COLONY COUNT: CPT | Performed by: PHYSICIAN ASSISTANT

## 2021-08-25 PROCEDURE — 80053 COMPREHEN METABOLIC PANEL: CPT | Performed by: PHYSICIAN ASSISTANT

## 2021-08-25 PROCEDURE — 74176 CT ABD & PELVIS W/O CONTRAST: CPT

## 2021-08-25 PROCEDURE — 99283 EMERGENCY DEPT VISIT LOW MDM: CPT

## 2021-08-25 PROCEDURE — 85007 BL SMEAR W/DIFF WBC COUNT: CPT | Performed by: PHYSICIAN ASSISTANT

## 2021-08-25 PROCEDURE — 81001 URINALYSIS AUTO W/SCOPE: CPT | Performed by: PHYSICIAN ASSISTANT

## 2021-08-25 PROCEDURE — 74176 CT ABD & PELVIS W/O CONTRAST: CPT | Performed by: RADIOLOGY

## 2021-08-25 PROCEDURE — 99284 EMERGENCY DEPT VISIT MOD MDM: CPT

## 2021-08-25 NOTE — ED NOTES
Patient sitting in the waiting area at this time. Patient updated about waiting times and progress, patient verbalized understanding. Denies any needs or concerns at this time. RR even and unlabored. SANGEETA. TAMRA.      Essence Laboy, RN  08/25/21 0238

## 2021-08-25 NOTE — ED NOTES
"Called over to pt, pt reports she wants to go home, states \"I've been here since midnight, I have to leave\", encouraged pt to stay for completion of treatment plan, verbalizes understanding of risks associated with leaving against medical advice, reports she will return if needed, alert and oriented x4,  forms signed, left ambulatory.     Gail Mcghee RN  08/25/21 0917    "

## 2021-08-25 NOTE — ED NOTES
Patient sitting in the waiting area at this time. Patient updated about waiting times and progress, patient verbalized understanding. Denies any needs or concerns at this time. RR even and unlabored. SANGEETA. TAMRA.      Essence Laboy RN  08/25/21 0645

## 2021-08-25 NOTE — ED NOTES
MEDICAL SCREENING:    Reason for Visit: left flank pain x 3 days, reports she feels short of breath but believes that is from the pain, reports the pain is worsening.  She states she has had nausea and vomiting as well.  States she has not had a menstrual period in 7 years and has had her right ovary removed.    Patient initially seen in triage.  The patient was advised further evaluation and diagnostic testing will be needed, some of the treatment and testing will be initiated in the lobby in order to begin the process.  The patient will be returned to the waiting area for the time being and possibly be re-assessed by a subsequent ED provider.  The patient will be brought back to the treatment area in as timely manner as possible.       Elisha Tomas PA  08/25/21 3381       Taras Shaw MD  08/26/21 0940

## 2021-08-25 NOTE — ED NOTES
Patient made aware of needed urine sample at this time, verbalized understanding.          Essence Laboy RN  08/25/21 0613

## 2021-08-25 NOTE — ED NOTES
Patient sitting in the waiting area at this time. Patient updated about waiting times and progress, patient verbalized understanding. Denies any needs or concerns at this time. RR even and unlabored. SANGEETA. TAMRA.      Essence Laboy, RN  08/25/21 0531

## 2021-08-26 LAB — BACTERIA SPEC AEROBE CULT: NORMAL

## 2021-08-29 ENCOUNTER — APPOINTMENT (OUTPATIENT)
Dept: CT IMAGING | Facility: HOSPITAL | Age: 36
End: 2021-08-29

## 2021-08-29 ENCOUNTER — HOSPITAL ENCOUNTER (EMERGENCY)
Facility: HOSPITAL | Age: 36
Discharge: HOME | End: 2021-08-29
Attending: EMERGENCY MEDICINE | Admitting: EMERGENCY MEDICINE

## 2021-08-29 VITALS
OXYGEN SATURATION: 95 % | RESPIRATION RATE: 20 BRPM | HEIGHT: 64 IN | HEART RATE: 101 BPM | DIASTOLIC BLOOD PRESSURE: 98 MMHG | SYSTOLIC BLOOD PRESSURE: 151 MMHG | TEMPERATURE: 99 F | BODY MASS INDEX: 50.02 KG/M2 | WEIGHT: 293 LBS

## 2021-08-29 DIAGNOSIS — R73.9 HYPERGLYCEMIA: ICD-10-CM

## 2021-08-29 DIAGNOSIS — N20.0 KIDNEY STONE ON LEFT SIDE: Primary | ICD-10-CM

## 2021-08-29 LAB
6-ACETYL MORPHINE: NEGATIVE
ALBUMIN SERPL-MCNC: 4.08 G/DL (ref 3.5–5.2)
ALBUMIN/GLOB SERPL: 1 G/DL
ALP SERPL-CCNC: 92 U/L (ref 39–117)
ALT SERPL W P-5'-P-CCNC: 33 U/L (ref 1–33)
AMPHET+METHAMPHET UR QL: NEGATIVE
ANION GAP SERPL CALCULATED.3IONS-SCNC: 18.8 MMOL/L (ref 5–15)
AST SERPL-CCNC: 47 U/L (ref 1–32)
B-HCG UR QL: NEGATIVE
BACTERIA UR QL AUTO: ABNORMAL /HPF
BARBITURATES UR QL SCN: NEGATIVE
BASOPHILS # BLD AUTO: 0.04 10*3/MM3 (ref 0–0.2)
BASOPHILS NFR BLD AUTO: 0.5 % (ref 0–1.5)
BENZODIAZ UR QL SCN: NEGATIVE
BILIRUB SERPL-MCNC: 0.4 MG/DL (ref 0–1.2)
BILIRUB UR QL STRIP: NEGATIVE
BUN SERPL-MCNC: 10 MG/DL (ref 6–20)
BUN/CREAT SERPL: 13.3 (ref 7–25)
BUPRENORPHINE SERPL-MCNC: NEGATIVE NG/ML
CALCIUM SPEC-SCNC: 9.5 MG/DL (ref 8.6–10.5)
CANNABINOIDS SERPL QL: NEGATIVE
CHLORIDE SERPL-SCNC: 91 MMOL/L (ref 98–107)
CLARITY UR: CLEAR
CO2 SERPL-SCNC: 19.2 MMOL/L (ref 22–29)
COCAINE UR QL: NEGATIVE
COLOR UR: YELLOW
CREAT SERPL-MCNC: 0.75 MG/DL (ref 0.57–1)
DEPRECATED RDW RBC AUTO: 42.9 FL (ref 37–54)
EOSINOPHIL # BLD AUTO: 0.03 10*3/MM3 (ref 0–0.4)
EOSINOPHIL NFR BLD AUTO: 0.4 % (ref 0.3–6.2)
ERYTHROCYTE [DISTWIDTH] IN BLOOD BY AUTOMATED COUNT: 14.4 % (ref 12.3–15.4)
FLUAV SUBTYP SPEC NAA+PROBE: NOT DETECTED
FLUBV RNA ISLT QL NAA+PROBE: NOT DETECTED
GFR SERPL CREATININE-BSD FRML MDRD: 88 ML/MIN/1.73
GLOBULIN UR ELPH-MCNC: 4.1 GM/DL
GLUCOSE BLDC GLUCOMTR-MCNC: 353 MG/DL (ref 70–130)
GLUCOSE BLDC GLUCOMTR-MCNC: 370 MG/DL (ref 70–130)
GLUCOSE SERPL-MCNC: 403 MG/DL (ref 65–99)
GLUCOSE UR STRIP-MCNC: ABNORMAL MG/DL
HCT VFR BLD AUTO: 37.4 % (ref 34–46.6)
HGB BLD-MCNC: 12.6 G/DL (ref 12–15.9)
HGB UR QL STRIP.AUTO: ABNORMAL
HYALINE CASTS UR QL AUTO: ABNORMAL /LPF
IMM GRANULOCYTES # BLD AUTO: 0.02 10*3/MM3 (ref 0–0.05)
IMM GRANULOCYTES NFR BLD AUTO: 0.3 % (ref 0–0.5)
KETONES UR QL STRIP: ABNORMAL
LEUKOCYTE ESTERASE UR QL STRIP.AUTO: NEGATIVE
LIPASE SERPL-CCNC: 40 U/L (ref 13–60)
LYMPHOCYTES # BLD AUTO: 0.63 10*3/MM3 (ref 0.7–3.1)
LYMPHOCYTES NFR BLD AUTO: 8.2 % (ref 19.6–45.3)
MCH RBC QN AUTO: 27.9 PG (ref 26.6–33)
MCHC RBC AUTO-ENTMCNC: 33.7 G/DL (ref 31.5–35.7)
MCV RBC AUTO: 82.9 FL (ref 79–97)
METHADONE UR QL SCN: NEGATIVE
MONOCYTES # BLD AUTO: 0.65 10*3/MM3 (ref 0.1–0.9)
MONOCYTES NFR BLD AUTO: 8.4 % (ref 5–12)
NEUTROPHILS NFR BLD AUTO: 6.34 10*3/MM3 (ref 1.7–7)
NEUTROPHILS NFR BLD AUTO: 82.2 % (ref 42.7–76)
NITRITE UR QL STRIP: NEGATIVE
NRBC BLD AUTO-RTO: 0 /100 WBC (ref 0–0.2)
OPIATES UR QL: NEGATIVE
OXYCODONE UR QL SCN: NEGATIVE
PCP UR QL SCN: NEGATIVE
PH UR STRIP.AUTO: <=5 [PH] (ref 5–8)
PLATELET # BLD AUTO: 227 10*3/MM3 (ref 140–450)
PMV BLD AUTO: 9 FL (ref 6–12)
POTASSIUM SERPL-SCNC: 4.4 MMOL/L (ref 3.5–5.2)
PROT SERPL-MCNC: 8.2 G/DL (ref 6–8.5)
PROT UR QL STRIP: ABNORMAL
RBC # BLD AUTO: 4.51 10*6/MM3 (ref 3.77–5.28)
RBC # UR: ABNORMAL /HPF
REF LAB TEST METHOD: ABNORMAL
SARS-COV-2 RNA PNL SPEC NAA+PROBE: NOT DETECTED
SODIUM SERPL-SCNC: 129 MMOL/L (ref 136–145)
SP GR UR STRIP: >1.03 (ref 1–1.03)
SQUAMOUS #/AREA URNS HPF: ABNORMAL /HPF
UROBILINOGEN UR QL STRIP: ABNORMAL
WBC # BLD AUTO: 7.71 10*3/MM3 (ref 3.4–10.8)
WBC CLUMPS # UR AUTO: ABNORMAL /HPF
WBC UR QL AUTO: ABNORMAL /HPF

## 2021-08-29 PROCEDURE — 80307 DRUG TEST PRSMV CHEM ANLYZR: CPT | Performed by: EMERGENCY MEDICINE

## 2021-08-29 PROCEDURE — 83690 ASSAY OF LIPASE: CPT | Performed by: EMERGENCY MEDICINE

## 2021-08-29 PROCEDURE — 82962 GLUCOSE BLOOD TEST: CPT

## 2021-08-29 PROCEDURE — 63710000001 INSULIN REGULAR HUMAN PER 5 UNITS: Performed by: EMERGENCY MEDICINE

## 2021-08-29 PROCEDURE — 99284 EMERGENCY DEPT VISIT MOD MDM: CPT

## 2021-08-29 PROCEDURE — 96375 TX/PRO/DX INJ NEW DRUG ADDON: CPT

## 2021-08-29 PROCEDURE — 80053 COMPREHEN METABOLIC PANEL: CPT | Performed by: EMERGENCY MEDICINE

## 2021-08-29 PROCEDURE — 96374 THER/PROPH/DIAG INJ IV PUSH: CPT

## 2021-08-29 PROCEDURE — 25010000002 ONDANSETRON PER 1 MG: Performed by: EMERGENCY MEDICINE

## 2021-08-29 PROCEDURE — 81001 URINALYSIS AUTO W/SCOPE: CPT | Performed by: EMERGENCY MEDICINE

## 2021-08-29 PROCEDURE — 85025 COMPLETE CBC W/AUTO DIFF WBC: CPT | Performed by: EMERGENCY MEDICINE

## 2021-08-29 PROCEDURE — 96376 TX/PRO/DX INJ SAME DRUG ADON: CPT

## 2021-08-29 PROCEDURE — 74176 CT ABD & PELVIS W/O CONTRAST: CPT

## 2021-08-29 PROCEDURE — 81025 URINE PREGNANCY TEST: CPT | Performed by: EMERGENCY MEDICINE

## 2021-08-29 PROCEDURE — 25010000002 MORPHINE PER 10 MG: Performed by: EMERGENCY MEDICINE

## 2021-08-29 PROCEDURE — 87636 SARSCOV2 & INF A&B AMP PRB: CPT | Performed by: EMERGENCY MEDICINE

## 2021-08-29 PROCEDURE — 25010000002 DIPHENHYDRAMINE PER 50 MG: Performed by: EMERGENCY MEDICINE

## 2021-08-29 RX ORDER — OXYCODONE HYDROCHLORIDE AND ACETAMINOPHEN 5; 325 MG/1; MG/1
1 TABLET ORAL EVERY 6 HOURS PRN
Qty: 12 TABLET | Refills: 0 | Status: ON HOLD | OUTPATIENT
Start: 2021-08-29 | End: 2021-10-01

## 2021-08-29 RX ORDER — ONDANSETRON 2 MG/ML
4 INJECTION INTRAMUSCULAR; INTRAVENOUS ONCE
Status: COMPLETED | OUTPATIENT
Start: 2021-08-29 | End: 2021-08-29

## 2021-08-29 RX ORDER — OXYCODONE AND ACETAMINOPHEN 10; 325 MG/1; MG/1
1 TABLET ORAL ONCE
Status: COMPLETED | OUTPATIENT
Start: 2021-08-29 | End: 2021-08-29

## 2021-08-29 RX ORDER — CEFDINIR 300 MG/1
300 CAPSULE ORAL 2 TIMES DAILY
Qty: 20 CAPSULE | Refills: 0 | Status: ON HOLD | OUTPATIENT
Start: 2021-08-29 | End: 2021-10-01

## 2021-08-29 RX ORDER — TAMSULOSIN HYDROCHLORIDE 0.4 MG/1
0.4 CAPSULE ORAL ONCE
Status: COMPLETED | OUTPATIENT
Start: 2021-08-29 | End: 2021-08-29

## 2021-08-29 RX ORDER — CLONIDINE HYDROCHLORIDE 0.1 MG/1
0.1 TABLET ORAL ONCE
Status: COMPLETED | OUTPATIENT
Start: 2021-08-29 | End: 2021-08-29

## 2021-08-29 RX ORDER — CEPHALEXIN 250 MG/1
500 CAPSULE ORAL ONCE
Status: COMPLETED | OUTPATIENT
Start: 2021-08-29 | End: 2021-08-29

## 2021-08-29 RX ORDER — IBUPROFEN 600 MG/1
600 TABLET ORAL ONCE
Status: DISCONTINUED | OUTPATIENT
Start: 2021-08-29 | End: 2021-08-29 | Stop reason: HOSPADM

## 2021-08-29 RX ORDER — DIPHENHYDRAMINE HYDROCHLORIDE 50 MG/ML
25 INJECTION INTRAMUSCULAR; INTRAVENOUS ONCE
Status: COMPLETED | OUTPATIENT
Start: 2021-08-29 | End: 2021-08-29

## 2021-08-29 RX ORDER — ONDANSETRON 4 MG/1
4 TABLET, ORALLY DISINTEGRATING ORAL EVERY 6 HOURS PRN
Qty: 30 TABLET | Refills: 0 | Status: ON HOLD | OUTPATIENT
Start: 2021-08-29 | End: 2021-10-01

## 2021-08-29 RX ORDER — METOPROLOL TARTRATE 5 MG/5ML
5 INJECTION INTRAVENOUS ONCE
Status: COMPLETED | OUTPATIENT
Start: 2021-08-29 | End: 2021-08-29

## 2021-08-29 RX ORDER — TAMSULOSIN HYDROCHLORIDE 0.4 MG/1
1 CAPSULE ORAL DAILY
Qty: 10 CAPSULE | Refills: 0 | Status: SHIPPED | OUTPATIENT
Start: 2021-08-29 | End: 2021-10-20

## 2021-08-29 RX ADMIN — ONDANSETRON 4 MG: 2 INJECTION INTRAMUSCULAR; INTRAVENOUS at 05:03

## 2021-08-29 RX ADMIN — ONDANSETRON 4 MG: 2 INJECTION INTRAMUSCULAR; INTRAVENOUS at 07:46

## 2021-08-29 RX ADMIN — OXYCODONE HYDROCHLORIDE AND ACETAMINOPHEN 1 TABLET: 10; 325 TABLET ORAL at 07:45

## 2021-08-29 RX ADMIN — SODIUM CHLORIDE 1000 ML: 9 INJECTION, SOLUTION INTRAVENOUS at 04:12

## 2021-08-29 RX ADMIN — HUMAN INSULIN 8 UNITS: 100 INJECTION, SOLUTION SUBCUTANEOUS at 07:45

## 2021-08-29 RX ADMIN — METOPROLOL TARTRATE 25 MG: 25 TABLET, FILM COATED ORAL at 08:03

## 2021-08-29 RX ADMIN — METOPROLOL TARTRATE 5 MG: 1 INJECTION, SOLUTION INTRAVENOUS at 04:10

## 2021-08-29 RX ADMIN — CLONIDINE HYDROCHLORIDE 0.1 MG: 0.1 TABLET ORAL at 04:11

## 2021-08-29 RX ADMIN — TAMSULOSIN HYDROCHLORIDE 0.4 MG: 0.4 CAPSULE ORAL at 07:45

## 2021-08-29 RX ADMIN — CEPHALEXIN 500 MG: 250 CAPSULE ORAL at 08:03

## 2021-08-29 RX ADMIN — MORPHINE SULFATE 4 MG: 4 INJECTION, SOLUTION INTRAMUSCULAR; INTRAVENOUS at 05:04

## 2021-08-29 RX ADMIN — DIPHENHYDRAMINE HYDROCHLORIDE 25 MG: 50 INJECTION, SOLUTION INTRAMUSCULAR; INTRAVENOUS at 04:10

## 2021-08-29 RX ADMIN — SODIUM CHLORIDE 1000 ML: 9 INJECTION, SOLUTION INTRAVENOUS at 07:45

## 2021-10-01 ENCOUNTER — APPOINTMENT (OUTPATIENT)
Dept: GENERAL RADIOLOGY | Facility: HOSPITAL | Age: 36
End: 2021-10-01

## 2021-10-01 ENCOUNTER — ANESTHESIA (OUTPATIENT)
Dept: PERIOP | Facility: HOSPITAL | Age: 36
End: 2021-10-01

## 2021-10-01 ENCOUNTER — HOSPITAL ENCOUNTER (OUTPATIENT)
Facility: HOSPITAL | Age: 36
Discharge: HOME | End: 2021-10-01
Attending: EMERGENCY MEDICINE | Admitting: UROLOGY

## 2021-10-01 ENCOUNTER — ANESTHESIA EVENT (OUTPATIENT)
Dept: PERIOP | Facility: HOSPITAL | Age: 36
End: 2021-10-01

## 2021-10-01 VITALS
HEART RATE: 103 BPM | HEIGHT: 64 IN | SYSTOLIC BLOOD PRESSURE: 173 MMHG | OXYGEN SATURATION: 95 % | WEIGHT: 293 LBS | RESPIRATION RATE: 20 BRPM | BODY MASS INDEX: 50.02 KG/M2 | DIASTOLIC BLOOD PRESSURE: 105 MMHG | TEMPERATURE: 98.2 F

## 2021-10-01 DIAGNOSIS — N20.1 URETERAL CALCULUS: ICD-10-CM

## 2021-10-01 DIAGNOSIS — N20.0 RENAL CALCULUS, LEFT: Primary | ICD-10-CM

## 2021-10-01 DIAGNOSIS — N20.0 NEPHROLITHIASIS: ICD-10-CM

## 2021-10-01 DIAGNOSIS — N23 RENAL COLIC ON LEFT SIDE: ICD-10-CM

## 2021-10-01 LAB
ALBUMIN SERPL-MCNC: 3.89 G/DL (ref 3.5–5.2)
ALBUMIN/GLOB SERPL: 0.9 G/DL
ALP SERPL-CCNC: 112 U/L (ref 39–117)
ALT SERPL W P-5'-P-CCNC: 15 U/L (ref 1–33)
ANION GAP SERPL CALCULATED.3IONS-SCNC: 15.7 MMOL/L (ref 5–15)
AST SERPL-CCNC: 18 U/L (ref 1–32)
B-HCG UR QL: NEGATIVE
B-HCG UR QL: NEGATIVE
BACTERIA UR QL AUTO: ABNORMAL /HPF
BASOPHILS # BLD AUTO: 0.03 10*3/MM3 (ref 0–0.2)
BASOPHILS NFR BLD AUTO: 0.5 % (ref 0–1.5)
BILIRUB SERPL-MCNC: 0.4 MG/DL (ref 0–1.2)
BILIRUB UR QL STRIP: NEGATIVE
BUN SERPL-MCNC: 17 MG/DL (ref 6–20)
BUN/CREAT SERPL: 21.5 (ref 7–25)
CALCIUM SPEC-SCNC: 9.3 MG/DL (ref 8.6–10.5)
CHLORIDE SERPL-SCNC: 93 MMOL/L (ref 98–107)
CLARITY UR: ABNORMAL
CO2 SERPL-SCNC: 22.3 MMOL/L (ref 22–29)
COLOR UR: YELLOW
CREAT SERPL-MCNC: 0.79 MG/DL (ref 0.57–1)
DEPRECATED RDW RBC AUTO: 41.9 FL (ref 37–54)
EOSINOPHIL # BLD AUTO: 0.09 10*3/MM3 (ref 0–0.4)
EOSINOPHIL NFR BLD AUTO: 1.5 % (ref 0.3–6.2)
ERYTHROCYTE [DISTWIDTH] IN BLOOD BY AUTOMATED COUNT: 14.8 % (ref 12.3–15.4)
GFR SERPL CREATININE-BSD FRML MDRD: 83 ML/MIN/1.73
GLOBULIN UR ELPH-MCNC: 4.4 GM/DL
GLUCOSE BLDC GLUCOMTR-MCNC: 386 MG/DL (ref 70–130)
GLUCOSE BLDC GLUCOMTR-MCNC: 482 MG/DL (ref 70–130)
GLUCOSE SERPL-MCNC: 445 MG/DL (ref 65–99)
GLUCOSE UR STRIP-MCNC: ABNORMAL MG/DL
HCT VFR BLD AUTO: 35.7 % (ref 34–46.6)
HGB BLD-MCNC: 11.9 G/DL (ref 12–15.9)
HGB UR QL STRIP.AUTO: ABNORMAL
HYALINE CASTS UR QL AUTO: ABNORMAL /LPF
IMM GRANULOCYTES # BLD AUTO: 0.03 10*3/MM3 (ref 0–0.05)
IMM GRANULOCYTES NFR BLD AUTO: 0.5 % (ref 0–0.5)
INTERNAL NEGATIVE CONTROL: NEGATIVE
INTERNAL POSITIVE CONTROL: POSITIVE
KETONES UR QL STRIP: NEGATIVE
LEUKOCYTE ESTERASE UR QL STRIP.AUTO: ABNORMAL
LYMPHOCYTES # BLD AUTO: 1.89 10*3/MM3 (ref 0.7–3.1)
LYMPHOCYTES NFR BLD AUTO: 31.4 % (ref 19.6–45.3)
Lab: NORMAL
MCH RBC QN AUTO: 26.4 PG (ref 26.6–33)
MCHC RBC AUTO-ENTMCNC: 33.3 G/DL (ref 31.5–35.7)
MCV RBC AUTO: 79.3 FL (ref 79–97)
MONOCYTES # BLD AUTO: 0.5 10*3/MM3 (ref 0.1–0.9)
MONOCYTES NFR BLD AUTO: 8.3 % (ref 5–12)
NEUTROPHILS NFR BLD AUTO: 3.48 10*3/MM3 (ref 1.7–7)
NEUTROPHILS NFR BLD AUTO: 57.8 % (ref 42.7–76)
NITRITE UR QL STRIP: NEGATIVE
NRBC BLD AUTO-RTO: 0 /100 WBC (ref 0–0.2)
PH UR STRIP.AUTO: 6 [PH] (ref 5–8)
PLATELET # BLD AUTO: 338 10*3/MM3 (ref 140–450)
PMV BLD AUTO: 8.5 FL (ref 6–12)
POTASSIUM SERPL-SCNC: 4.2 MMOL/L (ref 3.5–5.2)
PROT SERPL-MCNC: 8.3 G/DL (ref 6–8.5)
PROT UR QL STRIP: ABNORMAL
QT INTERVAL: 380 MS
QTC INTERVAL: 490 MS
RBC # BLD AUTO: 4.5 10*6/MM3 (ref 3.77–5.28)
RBC # UR: ABNORMAL /HPF
REF LAB TEST METHOD: ABNORMAL
SARS-COV-2 RNA RESP QL NAA+PROBE: NOT DETECTED
SODIUM SERPL-SCNC: 131 MMOL/L (ref 136–145)
SP GR UR STRIP: <=1.005 (ref 1–1.03)
SQUAMOUS #/AREA URNS HPF: ABNORMAL /HPF
UROBILINOGEN UR QL STRIP: ABNORMAL
WBC # BLD AUTO: 6.02 10*3/MM3 (ref 3.4–10.8)
WBC UR QL AUTO: ABNORMAL /HPF

## 2021-10-01 PROCEDURE — 25010000002 FENTANYL CITRATE (PF) 50 MCG/ML SOLUTION: Performed by: NURSE ANESTHETIST, CERTIFIED REGISTERED

## 2021-10-01 PROCEDURE — 25010000002 ONDANSETRON PER 1 MG: Performed by: NURSE ANESTHETIST, CERTIFIED REGISTERED

## 2021-10-01 PROCEDURE — C2617 STENT, NON-COR, TEM W/O DEL: HCPCS | Performed by: UROLOGY

## 2021-10-01 PROCEDURE — 93005 ELECTROCARDIOGRAM TRACING: CPT | Performed by: EMERGENCY MEDICINE

## 2021-10-01 PROCEDURE — 81025 URINE PREGNANCY TEST: CPT | Performed by: EMERGENCY MEDICINE

## 2021-10-01 PROCEDURE — 25010000002 HYDROMORPHONE 1 MG/ML SOLUTION: Performed by: EMERGENCY MEDICINE

## 2021-10-01 PROCEDURE — 76000 FLUOROSCOPY <1 HR PHYS/QHP: CPT

## 2021-10-01 PROCEDURE — 87635 SARS-COV-2 COVID-19 AMP PRB: CPT | Performed by: UROLOGY

## 2021-10-01 PROCEDURE — 25010000002 HYDRALAZINE PER 20 MG: Performed by: ANESTHESIOLOGY

## 2021-10-01 PROCEDURE — 63710000001 INSULIN REGULAR HUMAN PER 5 UNITS: Performed by: ANESTHESIOLOGY

## 2021-10-01 PROCEDURE — 93010 ELECTROCARDIOGRAM REPORT: CPT | Performed by: INTERNAL MEDICINE

## 2021-10-01 PROCEDURE — 96372 THER/PROPH/DIAG INJ SC/IM: CPT

## 2021-10-01 PROCEDURE — 85025 COMPLETE CBC W/AUTO DIFF WBC: CPT | Performed by: EMERGENCY MEDICINE

## 2021-10-01 PROCEDURE — 25010000002 PROPOFOL 10 MG/ML EMULSION: Performed by: NURSE ANESTHETIST, CERTIFIED REGISTERED

## 2021-10-01 PROCEDURE — 99284 EMERGENCY DEPT VISIT MOD MDM: CPT

## 2021-10-01 PROCEDURE — 76000 FLUOROSCOPY <1 HR PHYS/QHP: CPT | Performed by: RADIOLOGY

## 2021-10-01 PROCEDURE — 52332 CYSTOSCOPY AND TREATMENT: CPT | Performed by: UROLOGY

## 2021-10-01 PROCEDURE — 80053 COMPREHEN METABOLIC PANEL: CPT | Performed by: EMERGENCY MEDICINE

## 2021-10-01 PROCEDURE — 81001 URINALYSIS AUTO W/SCOPE: CPT | Performed by: EMERGENCY MEDICINE

## 2021-10-01 PROCEDURE — 63710000001 INSULIN REGULAR HUMAN PER 5 UNITS: Performed by: EMERGENCY MEDICINE

## 2021-10-01 PROCEDURE — 96375 TX/PRO/DX INJ NEW DRUG ADDON: CPT

## 2021-10-01 PROCEDURE — S0260 H&P FOR SURGERY: HCPCS | Performed by: UROLOGY

## 2021-10-01 PROCEDURE — 25010000002 FENTANYL CITRATE (PF) 50 MCG/ML SOLUTION: Performed by: ANESTHESIOLOGY

## 2021-10-01 PROCEDURE — 25010000002 GENTAMICIN PER 80 MG: Performed by: UROLOGY

## 2021-10-01 PROCEDURE — 25010000002 HYDRALAZINE PER 20 MG: Performed by: EMERGENCY MEDICINE

## 2021-10-01 PROCEDURE — 25010000002 ONDANSETRON PER 1 MG: Performed by: EMERGENCY MEDICINE

## 2021-10-01 PROCEDURE — 87086 URINE CULTURE/COLONY COUNT: CPT | Performed by: UROLOGY

## 2021-10-01 PROCEDURE — 82962 GLUCOSE BLOOD TEST: CPT

## 2021-10-01 PROCEDURE — 81025 URINE PREGNANCY TEST: CPT | Performed by: ANESTHESIOLOGY

## 2021-10-01 PROCEDURE — 25010000002 MIDAZOLAM PER 1 MG: Performed by: NURSE ANESTHETIST, CERTIFIED REGISTERED

## 2021-10-01 PROCEDURE — 96374 THER/PROPH/DIAG INJ IV PUSH: CPT

## 2021-10-01 PROCEDURE — C9803 HOPD COVID-19 SPEC COLLECT: HCPCS

## 2021-10-01 DEVICE — URETERAL STENT
Type: IMPLANTABLE DEVICE | Site: URETER | Status: FUNCTIONAL
Brand: POLARIS™ ULTRA

## 2021-10-01 RX ORDER — LABETALOL HYDROCHLORIDE 5 MG/ML
10 INJECTION, SOLUTION INTRAVENOUS ONCE
Status: COMPLETED | OUTPATIENT
Start: 2021-10-01 | End: 2021-10-01

## 2021-10-01 RX ORDER — MAGNESIUM HYDROXIDE 1200 MG/15ML
LIQUID ORAL AS NEEDED
Status: DISCONTINUED | OUTPATIENT
Start: 2021-10-01 | End: 2021-10-01 | Stop reason: HOSPADM

## 2021-10-01 RX ORDER — ONDANSETRON 2 MG/ML
INJECTION INTRAMUSCULAR; INTRAVENOUS AS NEEDED
Status: DISCONTINUED | OUTPATIENT
Start: 2021-10-01 | End: 2021-10-01 | Stop reason: SURG

## 2021-10-01 RX ORDER — ONDANSETRON 2 MG/ML
4 INJECTION INTRAMUSCULAR; INTRAVENOUS AS NEEDED
Status: DISCONTINUED | OUTPATIENT
Start: 2021-10-01 | End: 2021-10-01 | Stop reason: HOSPADM

## 2021-10-01 RX ORDER — SODIUM CHLORIDE 0.9 % (FLUSH) 0.9 %
10 SYRINGE (ML) INJECTION AS NEEDED
Status: DISCONTINUED | OUTPATIENT
Start: 2021-10-01 | End: 2021-10-01 | Stop reason: HOSPADM

## 2021-10-01 RX ORDER — HYDRALAZINE HYDROCHLORIDE 20 MG/ML
20 INJECTION INTRAMUSCULAR; INTRAVENOUS ONCE
Status: COMPLETED | OUTPATIENT
Start: 2021-10-01 | End: 2021-10-01

## 2021-10-01 RX ORDER — SODIUM CHLORIDE, SODIUM LACTATE, POTASSIUM CHLORIDE, CALCIUM CHLORIDE 600; 310; 30; 20 MG/100ML; MG/100ML; MG/100ML; MG/100ML
125 INJECTION, SOLUTION INTRAVENOUS ONCE
Status: DISCONTINUED | OUTPATIENT
Start: 2021-10-01 | End: 2021-10-01 | Stop reason: HOSPADM

## 2021-10-01 RX ORDER — FAMOTIDINE 10 MG/ML
INJECTION, SOLUTION INTRAVENOUS AS NEEDED
Status: DISCONTINUED | OUTPATIENT
Start: 2021-10-01 | End: 2021-10-01 | Stop reason: SURG

## 2021-10-01 RX ORDER — FENTANYL CITRATE 50 UG/ML
INJECTION, SOLUTION INTRAMUSCULAR; INTRAVENOUS AS NEEDED
Status: DISCONTINUED | OUTPATIENT
Start: 2021-10-01 | End: 2021-10-01 | Stop reason: SURG

## 2021-10-01 RX ORDER — LIDOCAINE HYDROCHLORIDE 20 MG/ML
INJECTION, SOLUTION INFILTRATION; PERINEURAL AS NEEDED
Status: DISCONTINUED | OUTPATIENT
Start: 2021-10-01 | End: 2021-10-01 | Stop reason: SURG

## 2021-10-01 RX ORDER — LABETALOL HYDROCHLORIDE 5 MG/ML
5 INJECTION, SOLUTION INTRAVENOUS AS NEEDED
Status: DISCONTINUED | OUTPATIENT
Start: 2021-10-01 | End: 2021-10-01 | Stop reason: SDUPTHER

## 2021-10-01 RX ORDER — IPRATROPIUM BROMIDE AND ALBUTEROL SULFATE 2.5; .5 MG/3ML; MG/3ML
3 SOLUTION RESPIRATORY (INHALATION) ONCE AS NEEDED
Status: DISCONTINUED | OUTPATIENT
Start: 2021-10-01 | End: 2021-10-01 | Stop reason: HOSPADM

## 2021-10-01 RX ORDER — SODIUM CHLORIDE 0.9 % (FLUSH) 0.9 %
10 SYRINGE (ML) INJECTION EVERY 12 HOURS SCHEDULED
Status: DISCONTINUED | OUTPATIENT
Start: 2021-10-01 | End: 2021-10-01 | Stop reason: HOSPADM

## 2021-10-01 RX ORDER — OXYCODONE AND ACETAMINOPHEN 10; 325 MG/1; MG/1
1 TABLET ORAL EVERY 4 HOURS PRN
Qty: 12 TABLET | Refills: 0 | Status: SHIPPED | OUTPATIENT
Start: 2021-10-01 | End: 2021-10-04 | Stop reason: SDUPTHER

## 2021-10-01 RX ORDER — MIDAZOLAM HYDROCHLORIDE 1 MG/ML
1 INJECTION INTRAMUSCULAR; INTRAVENOUS
Status: DISCONTINUED | OUTPATIENT
Start: 2021-10-01 | End: 2021-10-01 | Stop reason: SDUPTHER

## 2021-10-01 RX ORDER — ATROPA BELLADONNA AND OPIUM 16.2; 3 MG/1; MG/1
SUPPOSITORY RECTAL AS NEEDED
Status: DISCONTINUED | OUTPATIENT
Start: 2021-10-01 | End: 2021-10-01 | Stop reason: HOSPADM

## 2021-10-01 RX ORDER — HYDRALAZINE HYDROCHLORIDE 20 MG/ML
10 INJECTION INTRAMUSCULAR; INTRAVENOUS ONCE
Status: DISCONTINUED | OUTPATIENT
Start: 2021-10-01 | End: 2021-10-01 | Stop reason: HOSPADM

## 2021-10-01 RX ORDER — MIDAZOLAM HYDROCHLORIDE 1 MG/ML
INJECTION INTRAMUSCULAR; INTRAVENOUS AS NEEDED
Status: DISCONTINUED | OUTPATIENT
Start: 2021-10-01 | End: 2021-10-01 | Stop reason: SURG

## 2021-10-01 RX ORDER — FENTANYL CITRATE 50 UG/ML
50 INJECTION, SOLUTION INTRAMUSCULAR; INTRAVENOUS
Status: DISCONTINUED | OUTPATIENT
Start: 2021-10-01 | End: 2021-10-01 | Stop reason: HOSPADM

## 2021-10-01 RX ORDER — MIDAZOLAM HYDROCHLORIDE 1 MG/ML
1 INJECTION INTRAMUSCULAR; INTRAVENOUS
Status: DISCONTINUED | OUTPATIENT
Start: 2021-10-01 | End: 2021-10-01 | Stop reason: HOSPADM

## 2021-10-01 RX ORDER — LABETALOL HYDROCHLORIDE 5 MG/ML
5 INJECTION, SOLUTION INTRAVENOUS AS NEEDED
Status: DISCONTINUED | OUTPATIENT
Start: 2021-10-01 | End: 2021-10-01 | Stop reason: HOSPADM

## 2021-10-01 RX ORDER — ONDANSETRON 2 MG/ML
4 INJECTION INTRAMUSCULAR; INTRAVENOUS ONCE
Status: COMPLETED | OUTPATIENT
Start: 2021-10-01 | End: 2021-10-01

## 2021-10-01 RX ORDER — HYDRALAZINE HYDROCHLORIDE 20 MG/ML
10 INJECTION INTRAMUSCULAR; INTRAVENOUS ONCE
Status: COMPLETED | OUTPATIENT
Start: 2021-10-01 | End: 2021-10-01

## 2021-10-01 RX ORDER — SODIUM CHLORIDE 9 MG/ML
INJECTION, SOLUTION INTRAVENOUS CONTINUOUS PRN
Status: DISCONTINUED | OUTPATIENT
Start: 2021-10-01 | End: 2021-10-01 | Stop reason: SURG

## 2021-10-01 RX ORDER — SODIUM CHLORIDE, SODIUM LACTATE, POTASSIUM CHLORIDE, CALCIUM CHLORIDE 600; 310; 30; 20 MG/100ML; MG/100ML; MG/100ML; MG/100ML
100 INJECTION, SOLUTION INTRAVENOUS ONCE AS NEEDED
Status: DISCONTINUED | OUTPATIENT
Start: 2021-10-01 | End: 2021-10-01 | Stop reason: HOSPADM

## 2021-10-01 RX ORDER — PROPOFOL 10 MG/ML
VIAL (ML) INTRAVENOUS AS NEEDED
Status: DISCONTINUED | OUTPATIENT
Start: 2021-10-01 | End: 2021-10-01 | Stop reason: SURG

## 2021-10-01 RX ORDER — WARFARIN SODIUM 5 MG/1
5 TABLET ORAL
COMMUNITY
End: 2022-02-18

## 2021-10-01 RX ORDER — GENTAMICIN SULFATE 80 MG/100ML
80 INJECTION, SOLUTION INTRAVENOUS ONCE
Status: COMPLETED | OUTPATIENT
Start: 2021-10-01 | End: 2021-10-01

## 2021-10-01 RX ORDER — OXYCODONE HYDROCHLORIDE AND ACETAMINOPHEN 5; 325 MG/1; MG/1
1 TABLET ORAL ONCE
Status: COMPLETED | OUTPATIENT
Start: 2021-10-01 | End: 2021-10-01

## 2021-10-01 RX ORDER — CIPROFLOXACIN 500 MG/1
500 TABLET, FILM COATED ORAL 2 TIMES DAILY
Qty: 20 TABLET | Refills: 0 | OUTPATIENT
Start: 2021-10-01 | End: 2021-10-13

## 2021-10-01 RX ADMIN — FENTANYL CITRATE 50 MCG: 50 INJECTION INTRAMUSCULAR; INTRAVENOUS at 10:32

## 2021-10-01 RX ADMIN — SODIUM CHLORIDE 1000 ML: 9 INJECTION, SOLUTION INTRAVENOUS at 08:15

## 2021-10-01 RX ADMIN — HUMAN INSULIN 12 UNITS: 100 INJECTION, SOLUTION SUBCUTANEOUS at 09:29

## 2021-10-01 RX ADMIN — ONDANSETRON 4 MG: 2 INJECTION INTRAMUSCULAR; INTRAVENOUS at 12:56

## 2021-10-01 RX ADMIN — FENTANYL CITRATE 100 MCG: 50 INJECTION INTRAMUSCULAR; INTRAVENOUS at 11:08

## 2021-10-01 RX ADMIN — OXYCODONE HYDROCHLORIDE AND ACETAMINOPHEN 1 TABLET: 5; 325 TABLET ORAL at 12:40

## 2021-10-01 RX ADMIN — LABETALOL HYDROCHLORIDE 10 MG: 5 INJECTION, SOLUTION INTRAVENOUS at 08:09

## 2021-10-01 RX ADMIN — HYDROMORPHONE HYDROCHLORIDE 1 MG: 1 INJECTION, SOLUTION INTRAMUSCULAR; INTRAVENOUS; SUBCUTANEOUS at 08:09

## 2021-10-01 RX ADMIN — ONDANSETRON 4 MG: 2 INJECTION INTRAMUSCULAR; INTRAVENOUS at 08:08

## 2021-10-01 RX ADMIN — FAMOTIDINE 20 MG: 10 INJECTION INTRAVENOUS at 11:08

## 2021-10-01 RX ADMIN — FENTANYL CITRATE 50 MCG: 50 INJECTION INTRAMUSCULAR; INTRAVENOUS at 12:23

## 2021-10-01 RX ADMIN — HUMAN INSULIN 7 UNITS: 100 INJECTION, SOLUTION SUBCUTANEOUS at 10:45

## 2021-10-01 RX ADMIN — LABETALOL HYDROCHLORIDE 10 MG: 5 INJECTION, SOLUTION INTRAVENOUS at 12:15

## 2021-10-01 RX ADMIN — HYDRALAZINE HYDROCHLORIDE 20 MG: 20 INJECTION INTRAMUSCULAR; INTRAVENOUS at 13:28

## 2021-10-01 RX ADMIN — PROPOFOL 200 MG: 10 INJECTION, EMULSION INTRAVENOUS at 11:13

## 2021-10-01 RX ADMIN — LIDOCAINE HYDROCHLORIDE 60 MG: 20 INJECTION, SOLUTION INFILTRATION; PERINEURAL at 11:08

## 2021-10-01 RX ADMIN — ONDANSETRON 4 MG: 2 INJECTION INTRAMUSCULAR; INTRAVENOUS at 11:08

## 2021-10-01 RX ADMIN — SODIUM CHLORIDE: 9 INJECTION, SOLUTION INTRAVENOUS at 11:08

## 2021-10-01 RX ADMIN — LABETALOL HYDROCHLORIDE 5 MG: 5 INJECTION, SOLUTION INTRAVENOUS at 11:50

## 2021-10-01 RX ADMIN — MIDAZOLAM 2 MG: 1 INJECTION INTRAMUSCULAR; INTRAVENOUS at 11:08

## 2021-10-01 RX ADMIN — HYDRALAZINE HYDROCHLORIDE 10 MG: 20 INJECTION INTRAMUSCULAR; INTRAVENOUS at 12:40

## 2021-10-01 RX ADMIN — GENTAMICIN SULFATE 80 MG: 80 INJECTION, SOLUTION INTRAVENOUS at 08:40

## 2021-10-01 RX ADMIN — LABETALOL HYDROCHLORIDE 5 MG: 5 INJECTION, SOLUTION INTRAVENOUS at 12:00

## 2021-10-01 NOTE — ANESTHESIA POSTPROCEDURE EVALUATION
Patient: Natalia Arzate    Procedure Summary     Date: 10/01/21 Room / Location:  [facility] OR 06 /  [facility]    Anesthesia Start: 1108 Anesthesia Stop: 1133    Procedure: URETEROSCOPY WITH STENT PLACEMENT (Left ) Diagnosis:       Renal calculus, left      (Renal calculus, left [N20.0])    Surgeons: Milan Motley MD Provider: Vinh Carbajal MD    Anesthesia Type: general ASA Status: 4          Anesthesia Type: general    Vitals  Vitals Value Taken Time   /102 10/01/21 1300   Temp 99.3 °F (37.4 °C) 10/01/21 1134   Pulse 102 10/01/21 1300   Resp 19 10/01/21 1300   SpO2 96 % 10/01/21 1300           Post Anesthesia Care and Evaluation    Patient location during evaluation: PHASE II  Patient participation: complete - patient participated  Level of consciousness: awake and alert  Pain score: 0  Pain management: adequate  Airway patency: patent  Anesthetic complications: No anesthetic complications    Cardiovascular status: acceptable  Respiratory status: acceptable  Hydration status: acceptable    Comments: Will treat HTN with Hydralazine

## 2021-10-01 NOTE — ED PROVIDER NOTES
Subjective   36-year-old white female presents with flank pain.  Patient complains of 1 month history of intermittent left flank pain.  This is been much more severe over the past 5 hours.  Today she has severe left flank pain radiating to her left groin area.  She has had multiple episodes of nausea and vomiting over the past 5 hours.  She says that she has been taking her blood pressure medicine, although her blood pressure was high today at home.  She was seen here on  and CT showed 8 mm obstructing stone.  She contacted Dr. Motley's office earlier last month and they had told her that they were not currently able to do elective outpatient procedures due to Covid.          Review of Systems   All other systems reviewed and are negative.      Past Medical History:   Diagnosis Date   • A-fib (HCC)    • Abnormal ECG    • Anemia    • Anxiety    • Asthma    • Cancer (HCC)     Ovarian   • Depression    • Diabetes mellitus (HCC)    • DVT (deep venous thrombosis) (HCC)    • DVT (deep venous thrombosis) (HCC)    • Factor 5 Leiden mutation, heterozygous (HCC)    • Fibroid    • GERD (gastroesophageal reflux disease)    • Gestational diabetes    • Gout    • Hyperlipidemia    • Hypothyroid    • Kidney stone    • Migraines    • Neuropathy    • Ovarian cyst    • PE (pulmonary embolism)    • Polycystic ovary syndrome    • Preeclampsia    • Rh incompatibility    • TIA (transient ischemic attack)    • Urinary tract infection    • Varicella        Allergies   Allergen Reactions   • Amoxicillin Hives   • Haldol [Haloperidol] Hives   • Penicillins Hives   • Toradol [Ketorolac Tromethamine] Hives   • Tramadol Swelling       Past Surgical History:   Procedure Laterality Date   • ABDOMINAL SURGERY     • CARDIAC CATHETERIZATION     •  SECTION     • CHOLECYSTECTOMY     • COLONOSCOPY     • ENDOSCOPY     • RIGHT OOPHORECTOMY         Family History   Problem Relation Age of Onset   • No Known Problems Mother    • No Known  "Problems Father    • No Known Problems Sister    • No Known Problems Brother    • No Known Problems Son    • No Known Problems Daughter    • No Known Problems Maternal Grandmother    • No Known Problems Maternal Grandfather    • No Known Problems Paternal Grandmother    • No Known Problems Paternal Grandfather    • No Known Problems Cousin    • Rheum arthritis Neg Hx    • Osteoarthritis Neg Hx    • Asthma Neg Hx    • Diabetes Neg Hx    • Heart failure Neg Hx    • Hyperlipidemia Neg Hx    • Hypertension Neg Hx    • Migraines Neg Hx    • Rashes / Skin problems Neg Hx    • Seizures Neg Hx    • Stroke Neg Hx    • Thyroid disease Neg Hx        Social History     Socioeconomic History   • Marital status:      Spouse name: Not on file   • Number of children: Not on file   • Years of education: Not on file   • Highest education level: Not on file   Tobacco Use   • Smoking status: Never Smoker   • Smokeless tobacco: Never Used   Vaping Use   • Vaping Use: Never used   Substance and Sexual Activity   • Alcohol use: No   • Drug use: Never     Comment: Pt has track marks on right arm. Pt states \"It's from my INR being drawn.\"    • Sexual activity: Defer           Objective   Physical Exam  Vitals and nursing note reviewed. Chaperone present: Monie.   Constitutional:       Appearance: Normal appearance.   HENT:      Head: Normocephalic and atraumatic.      Mouth/Throat:      Mouth: Mucous membranes are moist.      Pharynx: Oropharynx is clear.   Cardiovascular:      Rate and Rhythm: Regular rhythm. Tachycardia present.      Heart sounds: Normal heart sounds. No murmur heard.   No friction rub. No gallop.    Pulmonary:      Effort: Pulmonary effort is normal. No respiratory distress.      Breath sounds: Normal breath sounds. No wheezing, rhonchi or rales.   Abdominal:      General: Abdomen is flat. Bowel sounds are normal. There is no distension.      Palpations: Abdomen is soft.      Tenderness: There is abdominal " tenderness (Mild) in the left lower quadrant. There is right CVA tenderness. There is no guarding.      Hernia: No hernia is present.   Musculoskeletal:         General: Normal range of motion.      Right lower leg: No edema.      Left lower leg: No edema.   Skin:     General: Skin is warm and dry.   Neurological:      General: No focal deficit present.      Mental Status: She is alert and oriented to person, place, and time.   Psychiatric:         Mood and Affect: Mood normal.         Behavior: Behavior normal.         Procedures        Results for orders placed or performed during the hospital encounter of 10/01/21   COVID-19,CEPHEID/ALISA/BDMAX,[facility]/JOCELYN/PAD/AALIYAH IN-HOUSE(OR EMERGENT/ADD-ON),NP SWAB IN TRANSPORT MEDIA 3-4 HR TAT, RT-PCR - Swab, Nasopharynx    Specimen: Nasopharynx; Swab   Result Value Ref Range    COVID19 Not Detected Not Detected - Ref. Range   Comprehensive Metabolic Panel    Specimen: Arm, Left; Blood   Result Value Ref Range    Glucose 445 (C) 65 - 99 mg/dL    BUN 17 6 - 20 mg/dL    Creatinine 0.79 0.57 - 1.00 mg/dL    Sodium 131 (L) 136 - 145 mmol/L    Potassium 4.2 3.5 - 5.2 mmol/L    Chloride 93 (L) 98 - 107 mmol/L    CO2 22.3 22.0 - 29.0 mmol/L    Calcium 9.3 8.6 - 10.5 mg/dL    Total Protein 8.3 6.0 - 8.5 g/dL    Albumin 3.89 3.50 - 5.20 g/dL    ALT (SGPT) 15 1 - 33 U/L    AST (SGOT) 18 1 - 32 U/L    Alkaline Phosphatase 112 39 - 117 U/L    Total Bilirubin 0.4 0.0 - 1.2 mg/dL    eGFR Non African Amer 83 >60 mL/min/1.73    Globulin 4.4 gm/dL    A/G Ratio 0.9 g/dL    BUN/Creatinine Ratio 21.5 7.0 - 25.0    Anion Gap 15.7 (H) 5.0 - 15.0 mmol/L   CBC Auto Differential    Specimen: Arm, Right; Blood   Result Value Ref Range    WBC 6.02 3.40 - 10.80 10*3/mm3    RBC 4.50 3.77 - 5.28 10*6/mm3    Hemoglobin 11.9 (L) 12.0 - 15.9 g/dL    Hematocrit 35.7 34.0 - 46.6 %    MCV 79.3 79.0 - 97.0 fL    MCH 26.4 (L) 26.6 - 33.0 pg    MCHC 33.3 31.5 - 35.7 g/dL    RDW 14.8 12.3 - 15.4 %    RDW-SD 41.9 37.0 -  54.0 fl    MPV 8.5 6.0 - 12.0 fL    Platelets 338 140 - 450 10*3/mm3    Neutrophil % 57.8 42.7 - 76.0 %    Lymphocyte % 31.4 19.6 - 45.3 %    Monocyte % 8.3 5.0 - 12.0 %    Eosinophil % 1.5 0.3 - 6.2 %    Basophil % 0.5 0.0 - 1.5 %    Immature Grans % 0.5 0.0 - 0.5 %    Neutrophils, Absolute 3.48 1.70 - 7.00 10*3/mm3    Lymphocytes, Absolute 1.89 0.70 - 3.10 10*3/mm3    Monocytes, Absolute 0.50 0.10 - 0.90 10*3/mm3    Eosinophils, Absolute 0.09 0.00 - 0.40 10*3/mm3    Basophils, Absolute 0.03 0.00 - 0.20 10*3/mm3    Immature Grans, Absolute 0.03 0.00 - 0.05 10*3/mm3    nRBC 0.0 0.0 - 0.2 /100 WBC   POC Urine Pregnancy Test    Specimen: Urine   Result Value Ref Range    HCG, Urine, QL Negative Negative    Lot Number [id number]     Internal Positive Control Positive Positive, Passed    Internal Negative Control Negative Negative, Passed   POC Glucose Once    Specimen: Blood   Result Value Ref Range    Glucose 482 (C) 70 - 130 mg/dL   ECG 12 Lead   Result Value Ref Range    QT Interval 380 ms    QTC Interval 490 ms           ED Course  ED Course as of Oct 01 1106   Fri Oct 01, 2021   0839 Normal sinus rhythm.  Rate 100.  Normal axis.  Prolonged QT interval.  No acute ischemic changes.  Abnormal EKG.  Interpreted by me.   ECG 12 Lead [BC]      ED Course User Index  [BC] Taras Shaw MD                                           MDM  Number of Diagnoses or Management Options  Risk of Complications, Morbidity, and/or Mortality  Presenting problems: moderate  Diagnostic procedures: moderate  Management options: high        Final diagnoses:   Nephrolithiasis   Renal colic on left side       ED Disposition  ED Disposition     ED Disposition Condition Comment    Send to OR            No follow-up provider specified.       Medication List      No changes were made to your prescriptions during this visit.          Taras Shaw MD  10/01/21 7530

## 2021-10-01 NOTE — H&P
36-year-old white female presents with flank pain.  Patient complains of 1 month history of intermittent left flank pain.  This is been much more severe over the past 5 hours.  Today she has severe left flank pain radiating to her left groin area.  She has had multiple episodes of nausea and vomiting over the past 5 hours.  She says that she has been taking her blood pressure medicine, although her blood pressure was high today at home.  She was seen here on  and CT showed 8 mm obstructing stone.  She contacted Dr. Motley's office earlier last month and they had told her that they were not currently able to do elective outpatient procedures due to Covid.              Review of Systems   All other systems reviewed and are negative.        Medical History        Past Medical History:   Diagnosis Date   • Abnormal ECG     • Anemia     • Anxiety     • Asthma     • Depression     • Diabetes mellitus (CMS/HCC)     • DVT (deep venous thrombosis) (CMS/HCC)     • Factor 5 Leiden mutation, heterozygous (CMS/HCC)     • Fibroid     • GERD (gastroesophageal reflux disease)     • Gestational diabetes     • Gout     • Hyperlipidemia     • Hypothyroid     • Kidney stone     • Migraines     • Neuropathy     • Ovarian cyst     • PE (pulmonary embolism)     • Polycystic ovary syndrome     • Preeclampsia     • Rh incompatibility     • Urinary tract infection     • Varicella                   Allergies   Allergen Reactions   • Amoxicillin Hives   • Haldol [Haloperidol] Hives   • Penicillins Hives   • Toradol [Ketorolac Tromethamine] Hives   • Tramadol Swelling         Surgical History         Past Surgical History:   Procedure Laterality Date   • CARDIAC CATHETERIZATION       •  SECTION       • CHOLECYSTECTOMY       • COLONOSCOPY                Family History   Problem Relation Age of Onset   • No Known Problems Mother     • No Known Problems Father     • No Known Problems Sister     • No Known Problems Brother     • No  "Known Problems Son     • No Known Problems Daughter     • No Known Problems Maternal Grandmother     • No Known Problems Maternal Grandfather     • No Known Problems Paternal Grandmother     • No Known Problems Paternal Grandfather     • No Known Problems Cousin     • Rheum arthritis Neg Hx     • Osteoarthritis Neg Hx     • Asthma Neg Hx     • Diabetes Neg Hx     • Heart failure Neg Hx     • Hyperlipidemia Neg Hx     • Hypertension Neg Hx     • Migraines Neg Hx     • Rashes / Skin problems Neg Hx     • Seizures Neg Hx     • Stroke Neg Hx     • Thyroid disease Neg Hx           Social History   Social History            Socioeconomic History   • Marital status:        Spouse name: Not on file   • Number of children: Not on file   • Years of education: Not on file   • Highest education level: Not on file   Tobacco Use   • Smoking status: Never Smoker   • Smokeless tobacco: Never Used   Vaping Use   • Vaping Use: Never used   Substance and Sexual Activity   • Alcohol use: No   • Drug use: Never       Comment: Pt has track marks on right arm. Pt states \"It's from my INR being drawn.\"    • Sexual activity: Defer                        Objective      Physical Exam  Vitals and nursing note reviewed. Chaperone present: Monie.   Constitutional:       Appearance: Normal appearance.   HENT:      Head: Normocephalic and atraumatic.      Mouth/Throat:      Mouth: Mucous membranes are moist.      Pharynx: Oropharynx is clear.   Cardiovascular:      Rate and Rhythm: Regular rhythm. Tachycardia present.      Heart sounds: Normal heart sounds. No murmur heard.   No friction rub. No gallop.    Pulmonary:      Effort: Pulmonary effort is normal. No respiratory distress.      Breath sounds: Normal breath sounds. No wheezing, rhonchi or rales.   Abdominal:      General: Abdomen is flat. Bowel sounds are normal. There is no distension.      Palpations: Abdomen is soft.      Tenderness: There is abdominal tenderness (Mild) in the " left lower quadrant. There is right CVA tenderness. There is no guarding.      Hernia: No hernia is present.   Musculoskeletal:         General: Normal range of motion.      Right lower leg: No edema.      Left lower leg: No edema.   Skin:     General: Skin is warm and dry.   Neurological:      General: No focal deficit present.      Mental Status: She is alert and oriented to person, place, and time.   Psychiatric:         Mood and Affect: Mood normal.         Behavior: Behavior normal.      Impression left 8 mm renal stone for stenting secondary to severe pain.

## 2021-10-01 NOTE — OP NOTE
URETEROSCOPY LASER LITHOTRIPSY WITH STENT INSERTION  Procedure Note    Natalia Arzate  10/1/2021    Pre-op Diagnosis:   Renal calculus, left [N20.0]    Post-op Diagnosis:     Post-Op Diagnosis Codes:     * Renal calculus, left [N20.0]    Procedure/CPT® Codes:  36-year-old white female presented emergency room with high blood pressure severe pain normal white count no fever presents today for MRSA study because the blood pressure and pain following an informed consent brought the operative suite prepped draped in a low dorsolithotomy position she super morbidly obese it was difficult.  Normal bladder normal left orifice site cannulated with an angiographic Glidewire passed to the level of the kidney past the stone and placed a six 5 x 26 double-J stent in excellent position there was purulent material and she tolerated it very well she has a sugar of 445 I would encourage her to optimize her sugar.  The stent was in perfect position visually and fluoroscopically I will see her back on Monday and will reevaluate her.    Procedure(s):  Cystoscopy left ureteral stent    Surgeon(s):  Milan Motley MD    Anesthesia: see anesthesia record    Staff:   Circulator: Anton Forbes RN  Scrub Person: Dilma Calix  Assistant: Chuyita Curiel CSA    Estimated Blood Loss: none  Urine Voided: * No values recorded between 10/1/2021 11:07 AM and 10/1/2021 11:25 AM *    Specimens:                ID Type Source Tests Collected by Time   1 : C&S of Urine  Urine Urine, Catheter URINE CULTURE Milan Motley MD 10/1/2021 1124         Drains: 5 x 26 double-J stent    Blood: N/A    Complications: None    Grafts and Implants: None    Milan Motley MD     Date: 10/1/2021  Time: 11:26 EDT

## 2021-10-01 NOTE — ANESTHESIA PROCEDURE NOTES
Airway  Urgency: elective    Date/Time: 10/1/2021 11:13 AM  Airway not difficult    General Information and Staff    Patient location during procedure: OR  Anesthesiologist: Vinh Carbajal MD  CRNA: Vilma Gill CRNA    Indications and Patient Condition  Indications for airway management: airway protection    Preoxygenated: yes  Mask difficulty assessment: 0 - not attempted    Final Airway Details  Final airway type: supraglottic airway      Successful airway: classic  Size 4    Number of attempts at approach: 1  Assessment: lips, teeth, and gum same as pre-op    Additional Comments  LMA placed with no trauma noted. Patient tolerated well. Good seal. Secured.

## 2021-10-01 NOTE — ANESTHESIA PREPROCEDURE EVALUATION
Anesthesia Evaluation     Patient summary reviewed and Nursing notes reviewed   no history of anesthetic complications:  NPO Solid Status: > 8 hours  NPO Liquid Status: > 8 hours           Airway   Mallampati: III  TM distance: <3 FB  Neck ROM: full  Dental    (+) poor dentition    Pulmonary - normal exam   (+) pulmonary embolism, asthma,  Cardiovascular - normal exam    (+) hypertension, DVT, hyperlipidemia,       Neuro/Psych  (+) headaches, psychiatric history Anxiety and Depression,     GI/Hepatic/Renal/Endo    (+) obesity, morbid obesity, GERD,  renal disease stones and ARF, diabetes mellitus type 2 poorly controlled using insulin,     Musculoskeletal (-) negative ROS    Abdominal  - normal exam   Substance History - negative use     OB/GYN negative ob/gyn ROS         Other      history of cancer (Ovarian Teratoma)                    Anesthesia Plan    ASA 4     general     intravenous induction     Anesthetic plan, all risks, benefits, and alternatives have been provided, discussed and informed consent has been obtained with: patient.  Use of blood products discussed with patient  Consented to blood products.       Lab Results   Component Value Date    WBC 6.02 10/01/2021    HGB 11.9 (L) 10/01/2021    HCT 35.7 10/01/2021    MCV 79.3 10/01/2021     10/01/2021       Lab Results   Component Value Date    GLUCOSE 445 (C) 10/01/2021    BUN 17 10/01/2021    CREATININE 0.79 10/01/2021    EGFRIFNONA 83 10/01/2021    EGFRIFAFRI 131 05/05/2020    BCR 21.5 10/01/2021    K 4.2 10/01/2021    CO2 22.3 10/01/2021    CALCIUM 9.3 10/01/2021    ALBUMIN 3.89 10/01/2021    LABIL2 0.9 05/05/2020    AST 18 10/01/2021    ALT 15 10/01/2021

## 2021-10-01 NOTE — ED NOTES
Pt changed into gown and prepared for surgery as discussed with provider.      Monie Anglin RN  10/01/21 0952

## 2021-10-02 LAB — BACTERIA SPEC AEROBE CULT: NO GROWTH

## 2021-10-04 ENCOUNTER — OFFICE VISIT (OUTPATIENT)
Dept: UROLOGY | Facility: CLINIC | Age: 36
End: 2021-10-04

## 2021-10-04 VITALS — BODY MASS INDEX: 50.02 KG/M2 | WEIGHT: 293 LBS | HEIGHT: 64 IN

## 2021-10-04 DIAGNOSIS — N20.0 RENAL CALCULUS, LEFT: ICD-10-CM

## 2021-10-04 DIAGNOSIS — N20.0 KIDNEY STONE: Primary | ICD-10-CM

## 2021-10-04 PROCEDURE — 99214 OFFICE O/P EST MOD 30 MIN: CPT | Performed by: UROLOGY

## 2021-10-04 RX ORDER — PROMETHAZINE HYDROCHLORIDE 25 MG/1
25 TABLET ORAL EVERY 6 HOURS PRN
Qty: 21 TABLET | Refills: 2 | Status: SHIPPED | OUTPATIENT
Start: 2021-10-04 | End: 2022-02-18

## 2021-10-04 RX ORDER — OXYCODONE AND ACETAMINOPHEN 10; 325 MG/1; MG/1
1 TABLET ORAL EVERY 6 HOURS PRN
Qty: 16 TABLET | Refills: 0 | Status: SHIPPED | OUTPATIENT
Start: 2021-10-04 | End: 2021-11-11 | Stop reason: SDUPTHER

## 2021-10-04 NOTE — PROGRESS NOTES
Chief Complaint:          Chief Complaint   Patient presents with   • Nephrolithiasis     surgery fu        HPI:   36 y.o. female who is status post an emergent stent placement for obstruction, hypertension and potential sepsis.  She has done well but this stent is causing significant pain she has an 8 mm UPJ stone the stent is in good position.  She currently has nausea significant pain I will set her up for lithotripsy      Past Medical History:        Past Medical History:   Diagnosis Date   • A-fib (HCC)    • Abnormal ECG    • Anemia    • Anxiety    • Asthma    • Cancer (HCC)     Ovarian   • Depression    • Diabetes mellitus (HCC)    • DVT (deep venous thrombosis) (HCC)    • DVT (deep venous thrombosis) (HCC)    • Factor 5 Leiden mutation, heterozygous (HCC)    • Fibroid    • GERD (gastroesophageal reflux disease)    • Gestational diabetes    • Gout    • Hyperlipidemia    • Hypothyroid    • Kidney stone    • Migraines    • Neuropathy    • Ovarian cyst    • PE (pulmonary embolism)    • Polycystic ovary syndrome    • Preeclampsia    • Rh incompatibility    • TIA (transient ischemic attack)    • Urinary tract infection    • Varicella          Current Meds:     Current Outpatient Medications   Medication Sig Dispense Refill   • cholecalciferol (VITAMIN D3) 1000 units tablet Take 2,000 Units by mouth Every Night.     • ciprofloxacin (Cipro) 500 MG tablet Take 1 tablet by mouth 2 (Two) Times a Day. 20 tablet 0   • enoxaparin (LOVENOX) 120 MG/0.8ML solution syringe Inject  under the skin into the appropriate area as directed Every 12 (Twelve) Hours. Unknown dose     • folic acid (FOLVITE) 1 MG tablet Take 1 mg by mouth Daily.     • hydroCHLOROthiazide (HYDRODIURIL) 25 MG tablet Take 25 mg by mouth Daily.     • insulin lispro (humaLOG) 100 UNIT/ML injection Inject 25 Units under the skin into the appropriate area as directed Daily.     • metoprolol tartrate (LOPRESSOR) 25 MG tablet Take 25 mg by mouth 2 (Two) Times a  "Day.     • Omega-3 Fatty Acids (fish oil) 1000 MG capsule capsule Take  by mouth Daily With Breakfast.     • oxyCODONE-acetaminophen (Percocet)  MG per tablet Take 1 tablet by mouth Every 4 (Four) Hours As Needed for Moderate Pain. 12 tablet 0   • tamsulosin (FLOMAX) 0.4 MG capsule 24 hr capsule Take 1 capsule by mouth Daily. 10 capsule 0   • vitamin B-12 (CYANOCOBALAMIN) 100 MCG tablet Take 50 mcg by mouth Daily.     • vitamin C (ASCORBIC ACID) 250 MG tablet Take 250 mg by mouth Daily.     • warfarin (COUMADIN) 5 MG tablet Take 5 mg by mouth Daily.       No current facility-administered medications for this visit.        Allergies:      Allergies   Allergen Reactions   • Amoxicillin Hives   • Haldol [Haloperidol] Hives   • Penicillins Hives   • Toradol [Ketorolac Tromethamine] Hives   • Tramadol Swelling        Past Surgical History:     Past Surgical History:   Procedure Laterality Date   • ABDOMINAL SURGERY     • CARDIAC CATHETERIZATION     •  SECTION     • CHOLECYSTECTOMY     • COLONOSCOPY     • ENDOSCOPY     • RIGHT OOPHORECTOMY     • URETEROSCOPY LASER LITHOTRIPSY WITH STENT INSERTION Left 10/1/2021    Procedure: URETEROSCOPY WITH STENT PLACEMENT;  Surgeon: Milan Motley MD;  Location: [facility];  Service: Urology;  Laterality: Left;         Social History:     Social History     Socioeconomic History   • Marital status:      Spouse name: Not on file   • Number of children: Not on file   • Years of education: Not on file   • Highest education level: Not on file   Tobacco Use   • Smoking status: Never Smoker   • Smokeless tobacco: Never Used   Vaping Use   • Vaping Use: Never used   Substance and Sexual Activity   • Alcohol use: No   • Drug use: Never     Comment: Pt has track marks on right arm. Pt states \"It's from my INR being drawn.\"    • Sexual activity: Defer       Family History:     Family History   Problem Relation Age of Onset   • No Known Problems Mother    • No Known " Problems Father    • No Known Problems Sister    • No Known Problems Brother    • No Known Problems Son    • No Known Problems Daughter    • No Known Problems Maternal Grandmother    • No Known Problems Maternal Grandfather    • No Known Problems Paternal Grandmother    • No Known Problems Paternal Grandfather    • No Known Problems Cousin    • Rheum arthritis Neg Hx    • Osteoarthritis Neg Hx    • Asthma Neg Hx    • Diabetes Neg Hx    • Heart failure Neg Hx    • Hyperlipidemia Neg Hx    • Hypertension Neg Hx    • Migraines Neg Hx    • Rashes / Skin problems Neg Hx    • Seizures Neg Hx    • Stroke Neg Hx    • Thyroid disease Neg Hx        Review of Systems:     Review of Systems   Constitutional: Positive for appetite change. Negative for activity change, chills, diaphoresis, fatigue and unexpected weight change.   HENT: Negative for congestion, dental problem, drooling, ear discharge, ear pain, facial swelling, hearing loss, mouth sores, nosebleeds, postnasal drip, rhinorrhea, sinus pressure, sneezing, sore throat, tinnitus, trouble swallowing and voice change.    Eyes: Negative.  Negative for photophobia, pain, discharge, redness, itching and visual disturbance.   Respiratory: Negative.  Negative for apnea, cough, choking, chest tightness, shortness of breath, wheezing and stridor.    Cardiovascular: Negative.  Negative for chest pain, palpitations and leg swelling.   Gastrointestinal: Negative.  Negative for abdominal distention, abdominal pain, anal bleeding, blood in stool, constipation, diarrhea, nausea, rectal pain and vomiting.   Endocrine: Negative.  Negative for cold intolerance, heat intolerance, polydipsia, polyphagia and polyuria.   Genitourinary: Positive for flank pain.   Musculoskeletal: Negative for arthralgias, back pain, gait problem, joint swelling, myalgias, neck pain and neck stiffness.   Skin: Negative.  Negative for color change, pallor, rash and wound.   Allergic/Immunologic: Negative.   Negative for environmental allergies, food allergies and immunocompromised state.   Neurological: Negative.  Negative for dizziness, tremors, seizures, syncope, facial asymmetry, speech difficulty, weakness, light-headedness, numbness and headaches.   Hematological: Negative.  Negative for adenopathy. Does not bruise/bleed easily.   Psychiatric/Behavioral: Negative for agitation, behavioral problems, confusion, decreased concentration, dysphoric mood, hallucinations, self-injury, sleep disturbance and suicidal ideas. The patient is not nervous/anxious and is not hyperactive.    All other systems reviewed and are negative.      Physical Exam:     Physical Exam  Constitutional:       Appearance: She is well-developed.   HENT:      Head: Normocephalic and atraumatic.      Right Ear: External ear normal.      Left Ear: External ear normal.   Eyes:      Conjunctiva/sclera: Conjunctivae normal.      Pupils: Pupils are equal, round, and reactive to light.   Cardiovascular:      Rate and Rhythm: Normal rate and regular rhythm.      Heart sounds: Normal heart sounds.   Pulmonary:      Effort: Pulmonary effort is normal.      Breath sounds: Normal breath sounds.   Abdominal:      General: Bowel sounds are normal. There is no distension.      Palpations: Abdomen is soft. There is no mass.      Tenderness: There is no abdominal tenderness. There is no guarding or rebound.   Genitourinary:     Vagina: No vaginal discharge.   Musculoskeletal:         General: Normal range of motion.   Skin:     General: Skin is warm and dry.   Neurological:      Mental Status: She is alert.      Deep Tendon Reflexes: Reflexes are normal and symmetric.   Psychiatric:         Behavior: Behavior normal.         Thought Content: Thought content normal.         Judgment: Judgment normal.         I have reviewed the following portions of the patient's history: allergies, current medications, past family history, past medical history, past social  history, past surgical history, problem list and ROS and confirm it's accurate.      Procedure:       Assessment/Plan:   Renal calculus-we discussed the presence of the stone we discussed the various therapeutic options available including percutaneous nephrostolithotomy, lithotripsy.  We discussed the risks of lithotripsy including the passage of stones the development of a large string of stones in the distal ureter known as Steinstrasse.  In the 3% incidence of that we will need to proceed with a ureteroscopy for obstructing fragments.  Extremely rare incidence of renal hematoma.  And the significance of this.  We discussed the absolute relative indicators for intervention including the presence of sepsis, and pain we cannot control is the primary need for urgent intervention.  We discussed placement of a stent if indicated and the management of the stent as well.  We will set her up for lithotripsy electively with stent removal concomitantly.  Nausea and vomiting-patient has significant nausea vomiting as a consequence of this we recommended Phenergan we also discussed the use of Zofran and the sedating side effects of Phenergan as well.  Narcotic pain medication-patient has significant acute pain that I believe would be an indication for the use of narcotic pain medication.  I discussed the significant risks of pain medication and the fact that this will be a short only option and I will give her no more than a three-day supply of pain medication.  And I will not plan long-term medication and that this will be sent to a pain clinic if at all becomes necessary.  We discussed signing a pain medication agreement and the fact that we're going to run a state PRANAY review to be sure the patient is not getting pain medication from elsewhere.  If this is the case we will not give pain medication.  As part of the patient's treatment plan of there being prescribed a controlled substance with potential for abuse.  This  patient has been wait aware of the appropriate dose of such medications including, the risk for somnolence, limited ability to drive and/or safety and the significant potential for overdose.  It is been made clear that these medications are for the prescribed patient only without concomitant use of alcohol or other sepsis unless prescribed by the medical provider.  Has completed prescribing agreement detailing the terms of continue prescribing him a controlled substance.  Including monitoring Pranay ports, the possibility of urine drug screens and pedal counts.  The patient is aware that we reviewed PRANAY reports on a regular basis and scan them into the chart.  History and physical examination exhibited continued safe and appropriate use of controlled substances.  Also discussed the fact that the new [place] legislation allows only a three-day prescription for pain medication.  In this situation he will be referred to a chronic pain clinic.

## 2021-10-05 RX ORDER — GENTAMICIN SULFATE 80 MG/100ML
80 INJECTION, SOLUTION INTRAVENOUS ONCE
Status: CANCELLED | OUTPATIENT
Start: 2021-10-22 | End: 2021-10-05

## 2021-10-13 ENCOUNTER — APPOINTMENT (OUTPATIENT)
Dept: CT IMAGING | Facility: HOSPITAL | Age: 36
End: 2021-10-13

## 2021-10-13 ENCOUNTER — HOSPITAL ENCOUNTER (EMERGENCY)
Facility: HOSPITAL | Age: 36
Discharge: HOME | End: 2021-10-13
Attending: STUDENT IN AN ORGANIZED HEALTH CARE EDUCATION/TRAINING PROGRAM | Admitting: EMERGENCY MEDICINE

## 2021-10-13 VITALS
HEIGHT: 64 IN | WEIGHT: 293 LBS | DIASTOLIC BLOOD PRESSURE: 104 MMHG | OXYGEN SATURATION: 94 % | HEART RATE: 94 BPM | TEMPERATURE: 98.3 F | RESPIRATION RATE: 18 BRPM | SYSTOLIC BLOOD PRESSURE: 183 MMHG | BODY MASS INDEX: 50.02 KG/M2

## 2021-10-13 DIAGNOSIS — N23 RENAL COLIC ON LEFT SIDE: Primary | ICD-10-CM

## 2021-10-13 DIAGNOSIS — N39.0 UTI (URINARY TRACT INFECTION), BACTERIAL: ICD-10-CM

## 2021-10-13 DIAGNOSIS — N20.0 KIDNEY STONE: Primary | ICD-10-CM

## 2021-10-13 DIAGNOSIS — A49.9 UTI (URINARY TRACT INFECTION), BACTERIAL: ICD-10-CM

## 2021-10-13 LAB
ALBUMIN SERPL-MCNC: 3.97 G/DL (ref 3.5–5.2)
ALBUMIN/GLOB SERPL: 1 G/DL
ALP SERPL-CCNC: 96 U/L (ref 39–117)
ALT SERPL W P-5'-P-CCNC: 24 U/L (ref 1–33)
ANION GAP SERPL CALCULATED.3IONS-SCNC: 14.2 MMOL/L (ref 5–15)
ANISOCYTOSIS BLD QL: NORMAL
AST SERPL-CCNC: 23 U/L (ref 1–32)
B-HCG UR QL: NEGATIVE
BACTERIA UR QL AUTO: ABNORMAL /HPF
BILIRUB SERPL-MCNC: 0.3 MG/DL (ref 0–1.2)
BILIRUB UR QL STRIP: NEGATIVE
BUN SERPL-MCNC: 15 MG/DL (ref 6–20)
BUN/CREAT SERPL: 19 (ref 7–25)
CALCIUM SPEC-SCNC: 9.4 MG/DL (ref 8.6–10.5)
CHLORIDE SERPL-SCNC: 96 MMOL/L (ref 98–107)
CLARITY UR: ABNORMAL
CO2 SERPL-SCNC: 21.8 MMOL/L (ref 22–29)
COLOR UR: YELLOW
CREAT SERPL-MCNC: 0.79 MG/DL (ref 0.57–1)
CRP SERPL-MCNC: 3.21 MG/DL (ref 0–0.5)
DEPRECATED RDW RBC AUTO: 46.8 FL (ref 37–54)
EOSINOPHIL # BLD MANUAL: 0.05 10*3/MM3 (ref 0–0.4)
EOSINOPHIL NFR BLD MANUAL: 1 % (ref 0.3–6.2)
ERYTHROCYTE [DISTWIDTH] IN BLOOD BY AUTOMATED COUNT: 16.2 % (ref 12.3–15.4)
GFR SERPL CREATININE-BSD FRML MDRD: 83 ML/MIN/1.73
GLOBULIN UR ELPH-MCNC: 4.1 GM/DL
GLUCOSE BLDC GLUCOMTR-MCNC: 405 MG/DL (ref 70–130)
GLUCOSE SERPL-MCNC: 418 MG/DL (ref 65–99)
GLUCOSE UR STRIP-MCNC: ABNORMAL MG/DL
GRAN CASTS URNS QL MICRO: ABNORMAL /LPF
HCT VFR BLD AUTO: 34.8 % (ref 34–46.6)
HGB BLD-MCNC: 11.6 G/DL (ref 12–15.9)
HGB UR QL STRIP.AUTO: ABNORMAL
HOLD SPECIMEN: NORMAL
HOLD SPECIMEN: NORMAL
HYALINE CASTS UR QL AUTO: ABNORMAL /LPF
INR PPP: 0.91 (ref 0.9–1.1)
KETONES UR QL STRIP: NEGATIVE
LEUKOCYTE ESTERASE UR QL STRIP.AUTO: ABNORMAL
LYMPHOCYTES # BLD MANUAL: 1.9 10*3/MM3 (ref 0.7–3.1)
LYMPHOCYTES NFR BLD MANUAL: 35 % (ref 19.6–45.3)
LYMPHOCYTES NFR BLD MANUAL: 8 % (ref 5–12)
MCH RBC QN AUTO: 27 PG (ref 26.6–33)
MCHC RBC AUTO-ENTMCNC: 33.3 G/DL (ref 31.5–35.7)
MCV RBC AUTO: 80.9 FL (ref 79–97)
MONOCYTES # BLD AUTO: 0.44 10*3/MM3 (ref 0.1–0.9)
NEUTROPHILS # BLD AUTO: 3.05 10*3/MM3 (ref 1.7–7)
NEUTROPHILS NFR BLD MANUAL: 54 % (ref 42.7–76)
NEUTS BAND NFR BLD MANUAL: 2 % (ref 0–5)
NITRITE UR QL STRIP: POSITIVE
PH UR STRIP.AUTO: 5.5 [PH] (ref 5–8)
PLATELET # BLD AUTO: 256 10*3/MM3 (ref 140–450)
PMV BLD AUTO: 8.8 FL (ref 6–12)
POTASSIUM SERPL-SCNC: 4.1 MMOL/L (ref 3.5–5.2)
PROT SERPL-MCNC: 8.1 G/DL (ref 6–8.5)
PROT UR QL STRIP: ABNORMAL
PROTHROMBIN TIME: 12.6 SECONDS (ref 12.8–14.5)
RBC # BLD AUTO: 4.3 10*6/MM3 (ref 3.77–5.28)
RBC # UR: ABNORMAL /HPF
REF LAB TEST METHOD: ABNORMAL
SCAN SLIDE: NORMAL
SMALL PLATELETS BLD QL SMEAR: ADEQUATE
SODIUM SERPL-SCNC: 132 MMOL/L (ref 136–145)
SP GR UR STRIP: 1.02 (ref 1–1.03)
SQUAMOUS #/AREA URNS HPF: ABNORMAL /HPF
UROBILINOGEN UR QL STRIP: ABNORMAL
WBC # BLD AUTO: 5.44 10*3/MM3 (ref 3.4–10.8)
WBC UR QL AUTO: ABNORMAL /HPF
WHOLE BLOOD HOLD SPECIMEN: NORMAL
WHOLE BLOOD HOLD SPECIMEN: NORMAL

## 2021-10-13 PROCEDURE — 85025 COMPLETE CBC W/AUTO DIFF WBC: CPT | Performed by: PHYSICIAN ASSISTANT

## 2021-10-13 PROCEDURE — 81001 URINALYSIS AUTO W/SCOPE: CPT | Performed by: PHYSICIAN ASSISTANT

## 2021-10-13 PROCEDURE — 99284 EMERGENCY DEPT VISIT MOD MDM: CPT

## 2021-10-13 PROCEDURE — P9612 CATHETERIZE FOR URINE SPEC: HCPCS

## 2021-10-13 PROCEDURE — 87147 CULTURE TYPE IMMUNOLOGIC: CPT | Performed by: PHYSICIAN ASSISTANT

## 2021-10-13 PROCEDURE — 85610 PROTHROMBIN TIME: CPT | Performed by: PHYSICIAN ASSISTANT

## 2021-10-13 PROCEDURE — 96365 THER/PROPH/DIAG IV INF INIT: CPT

## 2021-10-13 PROCEDURE — 80053 COMPREHEN METABOLIC PANEL: CPT | Performed by: PHYSICIAN ASSISTANT

## 2021-10-13 PROCEDURE — 25010000002 CEFTRIAXONE PER 250 MG: Performed by: EMERGENCY MEDICINE

## 2021-10-13 PROCEDURE — 82962 GLUCOSE BLOOD TEST: CPT

## 2021-10-13 PROCEDURE — 81025 URINE PREGNANCY TEST: CPT | Performed by: PHYSICIAN ASSISTANT

## 2021-10-13 PROCEDURE — 96375 TX/PRO/DX INJ NEW DRUG ADDON: CPT

## 2021-10-13 PROCEDURE — 86140 C-REACTIVE PROTEIN: CPT | Performed by: PHYSICIAN ASSISTANT

## 2021-10-13 PROCEDURE — 25010000002 ONDANSETRON PER 1 MG: Performed by: STUDENT IN AN ORGANIZED HEALTH CARE EDUCATION/TRAINING PROGRAM

## 2021-10-13 PROCEDURE — 25010000002 MORPHINE PER 10 MG: Performed by: EMERGENCY MEDICINE

## 2021-10-13 PROCEDURE — 87086 URINE CULTURE/COLONY COUNT: CPT | Performed by: PHYSICIAN ASSISTANT

## 2021-10-13 PROCEDURE — 74176 CT ABD & PELVIS W/O CONTRAST: CPT

## 2021-10-13 PROCEDURE — 63710000001 INSULIN REGULAR HUMAN PER 5 UNITS: Performed by: STUDENT IN AN ORGANIZED HEALTH CARE EDUCATION/TRAINING PROGRAM

## 2021-10-13 PROCEDURE — 25010000002 MORPHINE PER 10 MG: Performed by: STUDENT IN AN ORGANIZED HEALTH CARE EDUCATION/TRAINING PROGRAM

## 2021-10-13 PROCEDURE — 85007 BL SMEAR W/DIFF WBC COUNT: CPT | Performed by: PHYSICIAN ASSISTANT

## 2021-10-13 PROCEDURE — 96376 TX/PRO/DX INJ SAME DRUG ADON: CPT

## 2021-10-13 RX ORDER — CLONIDINE HYDROCHLORIDE 0.1 MG/1
0.1 TABLET ORAL ONCE
Status: DISCONTINUED | OUTPATIENT
Start: 2021-10-13 | End: 2021-10-13

## 2021-10-13 RX ORDER — ONDANSETRON 2 MG/ML
4 INJECTION INTRAMUSCULAR; INTRAVENOUS ONCE
Status: COMPLETED | OUTPATIENT
Start: 2021-10-13 | End: 2021-10-13

## 2021-10-13 RX ORDER — LABETALOL HYDROCHLORIDE 5 MG/ML
10 INJECTION, SOLUTION INTRAVENOUS ONCE
Status: COMPLETED | OUTPATIENT
Start: 2021-10-13 | End: 2021-10-13

## 2021-10-13 RX ORDER — HYDROCHLOROTHIAZIDE 25 MG/1
25 TABLET ORAL ONCE
Status: COMPLETED | OUTPATIENT
Start: 2021-10-13 | End: 2021-10-13

## 2021-10-13 RX ORDER — CEFDINIR 300 MG/1
300 CAPSULE ORAL EVERY 12 HOURS
Qty: 18 CAPSULE | Refills: 0 | Status: SHIPPED | OUTPATIENT
Start: 2021-10-13 | End: 2021-10-22

## 2021-10-13 RX ADMIN — METOPROLOL TARTRATE 25 MG: 25 TABLET, FILM COATED ORAL at 08:28

## 2021-10-13 RX ADMIN — HYDROCHLOROTHIAZIDE 25 MG: 25 TABLET ORAL at 08:28

## 2021-10-13 RX ADMIN — HUMAN INSULIN 5 UNITS: 100 INJECTION, SOLUTION SUBCUTANEOUS at 05:25

## 2021-10-13 RX ADMIN — MORPHINE SULFATE 4 MG: 4 INJECTION, SOLUTION INTRAMUSCULAR; INTRAVENOUS at 08:28

## 2021-10-13 RX ADMIN — ONDANSETRON 4 MG: 2 INJECTION INTRAMUSCULAR; INTRAVENOUS at 06:30

## 2021-10-13 RX ADMIN — MORPHINE SULFATE 4 MG: 4 INJECTION, SOLUTION INTRAMUSCULAR; INTRAVENOUS at 06:31

## 2021-10-13 RX ADMIN — LABETALOL HYDROCHLORIDE 10 MG: 5 INJECTION, SOLUTION INTRAVENOUS at 04:50

## 2021-10-13 RX ADMIN — SODIUM CHLORIDE 2 G: 9 INJECTION, SOLUTION INTRAVENOUS at 08:28

## 2021-10-13 NOTE — ED NOTES
MEDICAL SCREENING:    Reason for Visit: kidney stone, left flank pain    Patient initially seen in triage.  The patient was advised further evaluation and diagnostic testing will be needed, some of the treatment and testing will be initiated in the lobby in order to begin the process.  The patient will be returned to the waiting area for the time being and possibly be re-assessed by a subsequent ED provider.  The patient will be brought back to the treatment area in as timely manner as possible.       Thierry Fisher II, PA  10/13/21 0123

## 2021-10-13 NOTE — ED PROVIDER NOTES
Subjective   36-year-old female presented to the ER with department complaint of left-sided flank pain this been present for the last several days.  Patient notes she was evaluated previously in the ER and was diagnosed with nephrolithiasis with a large left-sided ureteral stone.  Patient required to be evaluated by urology and had a ureteral stent placed.  Patient is currently being scheduled for lithotripsy at this point time.  Denies chest pain or shortness of breath.  Noted slight nausea.  Denied significant changes in bowel or urinary habits.  Vitals stable          Review of Systems   Gastrointestinal: Positive for abdominal pain and nausea.   All other systems reviewed and are negative.      Past Medical History:   Diagnosis Date   • A-fib (HCC)    • Abnormal ECG    • Anemia    • Anxiety    • Asthma    • Cancer (HCC)     Ovarian   • Depression    • Diabetes mellitus (HCC)    • DVT (deep venous thrombosis) (HCC)    • DVT (deep venous thrombosis) (HCC)    • Factor 5 Leiden mutation, heterozygous (HCC)    • Fibroid    • GERD (gastroesophageal reflux disease)    • Gestational diabetes    • Gout    • Hyperlipidemia    • Hypothyroid    • Kidney stone    • Migraines    • Neuropathy    • Ovarian cyst    • PE (pulmonary embolism)    • Polycystic ovary syndrome    • Preeclampsia    • Rh incompatibility    • TIA (transient ischemic attack)    • Urinary tract infection    • Varicella        Allergies   Allergen Reactions   • Amoxicillin Hives   • Haldol [Haloperidol] Hives   • Penicillins Hives   • Toradol [Ketorolac Tromethamine] Hives   • Tramadol Swelling       Past Surgical History:   Procedure Laterality Date   • ABDOMINAL SURGERY     • CARDIAC CATHETERIZATION     •  SECTION     • CHOLECYSTECTOMY     • COLONOSCOPY     • ENDOSCOPY     • RIGHT OOPHORECTOMY     • URETEROSCOPY LASER LITHOTRIPSY WITH STENT INSERTION Left 10/1/2021    Procedure: URETEROSCOPY WITH STENT PLACEMENT;  Surgeon: Milan Motley,  "MD;  Location: [facility];  Service: Urology;  Laterality: Left;       Family History   Problem Relation Age of Onset   • No Known Problems Mother    • No Known Problems Father    • No Known Problems Sister    • No Known Problems Brother    • No Known Problems Son    • No Known Problems Daughter    • No Known Problems Maternal Grandmother    • No Known Problems Maternal Grandfather    • No Known Problems Paternal Grandmother    • No Known Problems Paternal Grandfather    • No Known Problems Cousin    • Rheum arthritis Neg Hx    • Osteoarthritis Neg Hx    • Asthma Neg Hx    • Diabetes Neg Hx    • Heart failure Neg Hx    • Hyperlipidemia Neg Hx    • Hypertension Neg Hx    • Migraines Neg Hx    • Rashes / Skin problems Neg Hx    • Seizures Neg Hx    • Stroke Neg Hx    • Thyroid disease Neg Hx        Social History     Socioeconomic History   • Marital status:    Tobacco Use   • Smoking status: Never Smoker   • Smokeless tobacco: Never Used   Vaping Use   • Vaping Use: Never used   Substance and Sexual Activity   • Alcohol use: No   • Drug use: Never     Comment: Pt has track marks on right arm. Pt states \"It's from my INR being drawn.\"    • Sexual activity: Defer           Objective   Physical Exam  Constitutional:       General: She is not in acute distress.     Appearance: Normal appearance. She is not ill-appearing.   HENT:      Head: Normocephalic and atraumatic.      Right Ear: External ear normal.      Left Ear: External ear normal.      Nose: Nose normal.      Mouth/Throat:      Mouth: Mucous membranes are moist.   Eyes:      Extraocular Movements: Extraocular movements intact.      Pupils: Pupils are equal, round, and reactive to light.   Cardiovascular:      Rate and Rhythm: Normal rate and regular rhythm.      Heart sounds: No murmur heard.      Pulmonary:      Effort: Pulmonary effort is normal. No respiratory distress.      Breath sounds: Normal breath sounds. No wheezing.   Abdominal:      General: " Bowel sounds are normal.      Palpations: Abdomen is soft.      Tenderness: There is abdominal tenderness in the left lower quadrant.      Comments: Left flank pain with palpation   Musculoskeletal:         General: No deformity or signs of injury. Normal range of motion.      Cervical back: Normal range of motion and neck supple.   Skin:     General: Skin is warm and dry.      Findings: No erythema.   Neurological:      General: No focal deficit present.      Mental Status: She is alert and oriented to person, place, and time. Mental status is at baseline.      Cranial Nerves: No cranial nerve deficit.   Psychiatric:         Mood and Affect: Mood normal.         Behavior: Behavior normal.         Thought Content: Thought content normal.         Procedures  CT Abdomen Pelvis Without Contrast   Final Result   1.  Well-positioned left ureteral stent.   2.  8 mm calculus in the left renal pelvis.   3.  Mild left pyelocaliectasis and hilar edema and mild left renal enlargement.   4.  Nonobstructing right renal calculus.   5.  Cholecystectomy. Hepatomegaly.      Signer Name: Esteban Banegas MD    Signed: 10/13/2021 5:31 AM    Workstation Name: [facility]     [facility]        Results for orders placed or performed during the hospital encounter of 10/13/21   Comprehensive Metabolic Panel    Specimen: Hand, Right; Blood   Result Value Ref Range    Glucose 418 (C) 65 - 99 mg/dL    BUN 15 6 - 20 mg/dL    Creatinine 0.79 0.57 - 1.00 mg/dL    Sodium 132 (L) 136 - 145 mmol/L    Potassium 4.1 3.5 - 5.2 mmol/L    Chloride 96 (L) 98 - 107 mmol/L    CO2 21.8 (L) 22.0 - 29.0 mmol/L    Calcium 9.4 8.6 - 10.5 mg/dL    Total Protein 8.1 6.0 - 8.5 g/dL    Albumin 3.97 3.50 - 5.20 g/dL    ALT (SGPT) 24 1 - 33 U/L    AST (SGOT) 23 1 - 32 U/L    Alkaline Phosphatase 96 39 - 117 U/L    Total Bilirubin 0.3 0.0 - 1.2 mg/dL    eGFR Non African Amer 83 >60 mL/min/1.73    Globulin 4.1 gm/dL    A/G Ratio 1.0 g/dL     BUN/Creatinine Ratio 19.0 7.0 - 25.0    Anion Gap 14.2 5.0 - 15.0 mmol/L   Urinalysis With Culture If Indicated - Urine, Catheter In/Out    Specimen: Urine, Catheter In/Out   Result Value Ref Range    Color, UA Yellow Yellow, Straw    Appearance, UA Cloudy (A) Clear    pH, UA 5.5 5.0 - 8.0    Specific Gravity, UA 1.025 1.005 - 1.030    Glucose, UA >=1000 mg/dL (3+) (A) Negative    Ketones, UA Negative Negative    Bilirubin, UA Negative Negative    Blood, UA Small (1+) (A) Negative    Protein,  mg/dL (2+) (A) Negative    Leuk Esterase, UA Small (1+) (A) Negative    Nitrite, UA Positive (A) Negative    Urobilinogen, UA 0.2 E.U./dL 0.2 - 1.0 E.U./dL   C-reactive Protein    Specimen: Hand, Right; Blood   Result Value Ref Range    C-Reactive Protein 3.21 (H) 0.00 - 0.50 mg/dL   Pregnancy, Urine - Urine, Catheter In/Out    Specimen: Urine, Catheter In/Out   Result Value Ref Range    HCG, Urine QL Negative Negative   Protime-INR    Specimen: Hand, Right; Blood   Result Value Ref Range    Protime 12.6 (L) 12.8 - 14.5 Seconds    INR 0.91 0.90 - 1.10   CBC Auto Differential    Specimen: Hand, Right; Blood   Result Value Ref Range    WBC 5.44 3.40 - 10.80 10*3/mm3    RBC 4.30 3.77 - 5.28 10*6/mm3    Hemoglobin 11.6 (L) 12.0 - 15.9 g/dL    Hematocrit 34.8 34.0 - 46.6 %    MCV 80.9 79.0 - 97.0 fL    MCH 27.0 26.6 - 33.0 pg    MCHC 33.3 31.5 - 35.7 g/dL    RDW 16.2 (H) 12.3 - 15.4 %    RDW-SD 46.8 37.0 - 54.0 fl    MPV 8.8 6.0 - 12.0 fL    Platelets 256 140 - 450 10*3/mm3   Scan Slide    Specimen: Hand, Right; Blood   Result Value Ref Range    Scan Slide     Manual Differential    Specimen: Hand, Right; Blood   Result Value Ref Range    Neutrophil % 54.0 42.7 - 76.0 %    Lymphocyte % 35.0 19.6 - 45.3 %    Monocyte % 8.0 5.0 - 12.0 %    Eosinophil % 1.0 0.3 - 6.2 %    Bands %  2.0 0.0 - 5.0 %    Neutrophils Absolute 3.05 1.70 - 7.00 10*3/mm3    Lymphocytes Absolute 1.90 0.70 - 3.10 10*3/mm3    Monocytes Absolute 0.44 0.10 -  0.90 10*3/mm3    Eosinophils Absolute 0.05 0.00 - 0.40 10*3/mm3    Anisocytosis Slight/1+ None Seen    Platelet Estimate Adequate Normal   Urinalysis, Microscopic Only - Urine, Catheter In/Out    Specimen: Urine, Catheter In/Out   Result Value Ref Range    RBC, UA 3-5 (A) None Seen, 0-2 /HPF    WBC, UA Too Numerous to Count (A) None Seen, 0-2 /HPF    Bacteria, UA 2+ (A) None Seen /HPF    Squamous Epithelial Cells, UA 0-2 None Seen, 0-2 /HPF    Hyaline Casts, UA 0-2 None Seen /LPF    Granular Casts, UA 0-2 None Seen /LPF    Methodology Manual Light Microscopy    POC Glucose Once    Specimen: Blood   Result Value Ref Range    Glucose 405 (C) 70 - 130 mg/dL   Green Top (Gel)   Result Value Ref Range    Extra Tube Hold for add-ons.    Lavender Top   Result Value Ref Range    Extra Tube hold for add-on    Gold Top - SST   Result Value Ref Range    Extra Tube Hold for add-ons.    Light Blue Top   Result Value Ref Range    Extra Tube hold for add-on                 ED Course  ED Course as of 10/13/21 0824   Wed Oct 13, 2021   0649 Care of this patient was transferred to the next attending physician at shift change.  Complete discussion of presentation, labs, imaging, care, and expected course occurred during transition of providers.  Vitals stable at transfer of care. [SF]   0650 Electronically signed by David Mccoy DO, 10/13/21, 6:50 AM EDT.   [SF]   0816 I assumed patient's care from Dr. Mccoy this morning shift change.  Please see his documentation above.  Patient is doing well, in no apparent acute distress.  She requests something more for pain.  She reports that she does have Percocet at home.  She reports that she has been taking Cipro, has 3 doses left.  However, her urine still appears infected.  She reports a penicillin allergy but states that she is able to take Rocephin without difficulty.  She has not had her morning medications today.  We are administering her antihypertensives here this morning.  We  discussed her test results and her plan of care.  She voices understanding and agreement. [CM]      ED Course User Index  [CM] Esvin Narayanan MD  [SF] David Mccoy, DO                                           MDM    Final diagnoses:   Renal colic on left side   UTI (urinary tract infection), bacterial       ED Disposition  ED Disposition     ED Disposition Condition Comment    Discharge Stable           Milan Motley MD  [address] [facility] [address]  [facility] [address]  [phone]    Go to   1 to 2 days    [facility]  [address] [facility] [address]  [phone]  Go to   If symptoms worsen         Medication List      New Prescriptions    cefdinir 300 MG capsule  Commonly known as: OMNICEF  Take 1 capsule by mouth Every 12 (Twelve) Hours for 9 days. Starting tomorrow morning, Thursday, October 14, 2021        Stop    ciprofloxacin 500 MG tablet  Commonly known as: Cipro           Where to Get Your Medications      You can get these medications from any pharmacy    Bring a paper prescription for each of these medications  · cefdinir 300 MG capsule       Please note that portions of this note were completed with a voice recognition program.        Esvin Narayanan MD  10/13/21 4695

## 2021-10-13 NOTE — DISCHARGE INSTRUCTIONS
Home to rest.  Drink lots of fluids.  Take your medication as prescribed.  See Dr. Motley in the office in 1 to 2 days.  Return to the emergency department right away if symptoms worsen/any problems.

## 2021-10-14 LAB — BACTERIA SPEC AEROBE CULT: ABNORMAL

## 2021-10-17 ENCOUNTER — APPOINTMENT (OUTPATIENT)
Dept: CT IMAGING | Facility: HOSPITAL | Age: 36
End: 2021-10-17

## 2021-10-17 ENCOUNTER — HOSPITAL ENCOUNTER (EMERGENCY)
Facility: HOSPITAL | Age: 36
Discharge: HOME | End: 2021-10-17
Attending: EMERGENCY MEDICINE | Admitting: EMERGENCY MEDICINE

## 2021-10-17 VITALS
HEIGHT: 64 IN | RESPIRATION RATE: 18 BRPM | WEIGHT: 293 LBS | OXYGEN SATURATION: 98 % | DIASTOLIC BLOOD PRESSURE: 112 MMHG | HEART RATE: 102 BPM | SYSTOLIC BLOOD PRESSURE: 156 MMHG | BODY MASS INDEX: 50.02 KG/M2 | TEMPERATURE: 98.8 F

## 2021-10-17 DIAGNOSIS — N20.1 URETEROLITHIASIS: Primary | ICD-10-CM

## 2021-10-17 DIAGNOSIS — N23 RENAL COLIC: ICD-10-CM

## 2021-10-17 LAB
ALBUMIN SERPL-MCNC: 4.26 G/DL (ref 3.5–5.2)
ALBUMIN/GLOB SERPL: 1 G/DL
ALP SERPL-CCNC: 92 U/L (ref 39–117)
ALT SERPL W P-5'-P-CCNC: 27 U/L (ref 1–33)
ANION GAP SERPL CALCULATED.3IONS-SCNC: 18.4 MMOL/L (ref 5–15)
AST SERPL-CCNC: 30 U/L (ref 1–32)
B-HCG UR QL: NEGATIVE
BACTERIA UR QL AUTO: ABNORMAL /HPF
BASOPHILS # BLD AUTO: 0.04 10*3/MM3 (ref 0–0.2)
BASOPHILS NFR BLD AUTO: 0.7 % (ref 0–1.5)
BILIRUB SERPL-MCNC: 0.2 MG/DL (ref 0–1.2)
BILIRUB UR QL STRIP: NEGATIVE
BUN SERPL-MCNC: 13 MG/DL (ref 6–20)
BUN/CREAT SERPL: 16.3 (ref 7–25)
CALCIUM SPEC-SCNC: 9.2 MG/DL (ref 8.6–10.5)
CHLORIDE SERPL-SCNC: 92 MMOL/L (ref 98–107)
CLARITY UR: CLEAR
CO2 SERPL-SCNC: 20.6 MMOL/L (ref 22–29)
COLOR UR: YELLOW
CREAT SERPL-MCNC: 0.8 MG/DL (ref 0.57–1)
DEPRECATED RDW RBC AUTO: 49.9 FL (ref 37–54)
EOSINOPHIL # BLD AUTO: 0.12 10*3/MM3 (ref 0–0.4)
EOSINOPHIL NFR BLD AUTO: 2.2 % (ref 0.3–6.2)
ERYTHROCYTE [DISTWIDTH] IN BLOOD BY AUTOMATED COUNT: 16.8 % (ref 12.3–15.4)
GFR SERPL CREATININE-BSD FRML MDRD: 82 ML/MIN/1.73
GLOBULIN UR ELPH-MCNC: 4.1 GM/DL
GLUCOSE BLDC GLUCOMTR-MCNC: 415 MG/DL (ref 70–130)
GLUCOSE SERPL-MCNC: 474 MG/DL (ref 65–99)
GLUCOSE UR STRIP-MCNC: ABNORMAL MG/DL
HCT VFR BLD AUTO: 37.7 % (ref 34–46.6)
HGB BLD-MCNC: 12.5 G/DL (ref 12–15.9)
HGB UR QL STRIP.AUTO: NEGATIVE
HYALINE CASTS UR QL AUTO: ABNORMAL /LPF
IMM GRANULOCYTES # BLD AUTO: 0.01 10*3/MM3 (ref 0–0.05)
IMM GRANULOCYTES NFR BLD AUTO: 0.2 % (ref 0–0.5)
INR PPP: 0.87 (ref 0.9–1.1)
KETONES UR QL STRIP: NEGATIVE
LEUKOCYTE ESTERASE UR QL STRIP.AUTO: ABNORMAL
LYMPHOCYTES # BLD AUTO: 2.11 10*3/MM3 (ref 0.7–3.1)
LYMPHOCYTES NFR BLD AUTO: 38.4 % (ref 19.6–45.3)
MCH RBC QN AUTO: 27.4 PG (ref 26.6–33)
MCHC RBC AUTO-ENTMCNC: 33.2 G/DL (ref 31.5–35.7)
MCV RBC AUTO: 82.7 FL (ref 79–97)
MONOCYTES # BLD AUTO: 0.51 10*3/MM3 (ref 0.1–0.9)
MONOCYTES NFR BLD AUTO: 9.3 % (ref 5–12)
NEUTROPHILS NFR BLD AUTO: 2.71 10*3/MM3 (ref 1.7–7)
NEUTROPHILS NFR BLD AUTO: 49.2 % (ref 42.7–76)
NITRITE UR QL STRIP: NEGATIVE
NRBC BLD AUTO-RTO: 0 /100 WBC (ref 0–0.2)
PH UR STRIP.AUTO: 5.5 [PH] (ref 5–8)
PLATELET # BLD AUTO: 265 10*3/MM3 (ref 140–450)
PMV BLD AUTO: 8.8 FL (ref 6–12)
POTASSIUM SERPL-SCNC: 4.4 MMOL/L (ref 3.5–5.2)
PROT SERPL-MCNC: 8.4 G/DL (ref 6–8.5)
PROT UR QL STRIP: ABNORMAL
PROTHROMBIN TIME: 12.2 SECONDS (ref 12.8–14.5)
QT INTERVAL: 308 MS
QTC INTERVAL: 440 MS
RBC # BLD AUTO: 4.56 10*6/MM3 (ref 3.77–5.28)
RBC # UR: ABNORMAL /HPF
REF LAB TEST METHOD: ABNORMAL
SODIUM SERPL-SCNC: 131 MMOL/L (ref 136–145)
SP GR UR STRIP: 1.02 (ref 1–1.03)
SQUAMOUS #/AREA URNS HPF: ABNORMAL /HPF
UROBILINOGEN UR QL STRIP: ABNORMAL
WBC # BLD AUTO: 5.5 10*3/MM3 (ref 3.4–10.8)
WBC UR QL AUTO: ABNORMAL /HPF

## 2021-10-17 PROCEDURE — P9612 CATHETERIZE FOR URINE SPEC: HCPCS

## 2021-10-17 PROCEDURE — 87147 CULTURE TYPE IMMUNOLOGIC: CPT | Performed by: EMERGENCY MEDICINE

## 2021-10-17 PROCEDURE — 93005 ELECTROCARDIOGRAM TRACING: CPT | Performed by: EMERGENCY MEDICINE

## 2021-10-17 PROCEDURE — 25010000002 HYDROMORPHONE PER 4 MG: Performed by: EMERGENCY MEDICINE

## 2021-10-17 PROCEDURE — 96374 THER/PROPH/DIAG INJ IV PUSH: CPT

## 2021-10-17 PROCEDURE — 87040 BLOOD CULTURE FOR BACTERIA: CPT | Performed by: EMERGENCY MEDICINE

## 2021-10-17 PROCEDURE — 85025 COMPLETE CBC W/AUTO DIFF WBC: CPT | Performed by: EMERGENCY MEDICINE

## 2021-10-17 PROCEDURE — 74176 CT ABD & PELVIS W/O CONTRAST: CPT

## 2021-10-17 PROCEDURE — 25010000002 ONDANSETRON PER 1 MG: Performed by: EMERGENCY MEDICINE

## 2021-10-17 PROCEDURE — 99284 EMERGENCY DEPT VISIT MOD MDM: CPT

## 2021-10-17 PROCEDURE — 96376 TX/PRO/DX INJ SAME DRUG ADON: CPT

## 2021-10-17 PROCEDURE — 81001 URINALYSIS AUTO W/SCOPE: CPT | Performed by: EMERGENCY MEDICINE

## 2021-10-17 PROCEDURE — 96375 TX/PRO/DX INJ NEW DRUG ADDON: CPT

## 2021-10-17 PROCEDURE — 85610 PROTHROMBIN TIME: CPT | Performed by: EMERGENCY MEDICINE

## 2021-10-17 PROCEDURE — 82962 GLUCOSE BLOOD TEST: CPT

## 2021-10-17 PROCEDURE — 25010000002 HYDROMORPHONE 1 MG/ML SOLUTION: Performed by: EMERGENCY MEDICINE

## 2021-10-17 PROCEDURE — 93010 ELECTROCARDIOGRAM REPORT: CPT | Performed by: SPECIALIST

## 2021-10-17 PROCEDURE — 63710000001 INSULIN REGULAR HUMAN PER 5 UNITS: Performed by: EMERGENCY MEDICINE

## 2021-10-17 PROCEDURE — 81025 URINE PREGNANCY TEST: CPT | Performed by: EMERGENCY MEDICINE

## 2021-10-17 PROCEDURE — 80053 COMPREHEN METABOLIC PANEL: CPT | Performed by: EMERGENCY MEDICINE

## 2021-10-17 PROCEDURE — 87086 URINE CULTURE/COLONY COUNT: CPT | Performed by: EMERGENCY MEDICINE

## 2021-10-17 RX ORDER — SODIUM CHLORIDE 0.9 % (FLUSH) 0.9 %
10 SYRINGE (ML) INJECTION AS NEEDED
Status: DISCONTINUED | OUTPATIENT
Start: 2021-10-17 | End: 2021-10-17 | Stop reason: HOSPADM

## 2021-10-17 RX ORDER — METOPROLOL TARTRATE 5 MG/5ML
5 INJECTION INTRAVENOUS ONCE
Status: COMPLETED | OUTPATIENT
Start: 2021-10-17 | End: 2021-10-17

## 2021-10-17 RX ORDER — HYDROCODONE BITARTRATE AND ACETAMINOPHEN 10; 325 MG/1; MG/1
1 TABLET ORAL EVERY 6 HOURS PRN
Qty: 12 TABLET | Refills: 0 | Status: SHIPPED | OUTPATIENT
Start: 2021-10-17 | End: 2022-02-18

## 2021-10-17 RX ORDER — HYDROMORPHONE HYDROCHLORIDE 1 MG/ML
0.5 INJECTION, SOLUTION INTRAMUSCULAR; INTRAVENOUS; SUBCUTANEOUS ONCE
Status: COMPLETED | OUTPATIENT
Start: 2021-10-17 | End: 2021-10-17

## 2021-10-17 RX ORDER — ONDANSETRON 2 MG/ML
4 INJECTION INTRAMUSCULAR; INTRAVENOUS ONCE
Status: COMPLETED | OUTPATIENT
Start: 2021-10-17 | End: 2021-10-17

## 2021-10-17 RX ORDER — METOPROLOL TARTRATE 5 MG/5ML
INJECTION INTRAVENOUS
Status: COMPLETED
Start: 2021-10-17 | End: 2021-10-17

## 2021-10-17 RX ADMIN — HYDROMORPHONE HYDROCHLORIDE 0.5 MG: 1 INJECTION, SOLUTION INTRAMUSCULAR; INTRAVENOUS; SUBCUTANEOUS at 08:29

## 2021-10-17 RX ADMIN — METOPROLOL TARTRATE 5 MG: 1 INJECTION, SOLUTION INTRAVENOUS at 10:11

## 2021-10-17 RX ADMIN — SODIUM CHLORIDE 1000 ML: 9 INJECTION, SOLUTION INTRAVENOUS at 08:29

## 2021-10-17 RX ADMIN — HYDROMORPHONE HYDROCHLORIDE 1 MG: 1 INJECTION, SOLUTION INTRAMUSCULAR; INTRAVENOUS; SUBCUTANEOUS at 10:34

## 2021-10-17 RX ADMIN — ONDANSETRON 4 MG: 2 INJECTION INTRAMUSCULAR; INTRAVENOUS at 08:29

## 2021-10-17 RX ADMIN — HUMAN INSULIN 12 UNITS: 100 INJECTION, SOLUTION SUBCUTANEOUS at 09:49

## 2021-10-17 RX ADMIN — METOPROLOL TARTRATE 5 MG: 5 INJECTION INTRAVENOUS at 10:11

## 2021-10-17 RX ADMIN — HYDROMORPHONE HYDROCHLORIDE 0.5 MG: 1 INJECTION, SOLUTION INTRAMUSCULAR; INTRAVENOUS; SUBCUTANEOUS at 09:49

## 2021-10-17 NOTE — ED NOTES
Pt BP down to 177/123, Pt reports that her baseline BP is 160/115, Dr. Shaw made aware.      Monie Anglin, RN  10/17/21 1127

## 2021-10-17 NOTE — ED PROVIDER NOTES
Subjective   36-year-old white female complains of flank pain.  Patient states that she had a ureteral stent placed on the right a couple of weeks ago.  Now she complains of pain on the right as well as new pain on the left flank for the last 6 to 8 hours.  She complains of nausea and vomiting and states her urine is dark.  She has not taken her home medicines yet this morning.  She did take her Percocet, which did not help her symptoms.  She denied any fever, chills, diarrhea, chest pain, shortness of breath or other complaints.          Review of Systems   All other systems reviewed and are negative.      Past Medical History:   Diagnosis Date   • A-fib (HCC)    • Abnormal ECG    • Anemia    • Anxiety    • Asthma    • Cancer (HCC)     Ovarian   • Depression    • Diabetes mellitus (HCC)    • DVT (deep venous thrombosis) (HCC)    • DVT (deep venous thrombosis) (HCC)    • Factor 5 Leiden mutation, heterozygous (HCC)    • Fibroid    • GERD (gastroesophageal reflux disease)    • Gestational diabetes    • Gout    • Hyperlipidemia    • Hypothyroid    • Kidney stone    • Migraines    • Neuropathy    • Ovarian cyst    • PE (pulmonary embolism)    • Polycystic ovary syndrome    • Preeclampsia    • Rh incompatibility    • TIA (transient ischemic attack)    • Urinary tract infection    • Varicella        Allergies   Allergen Reactions   • Amoxicillin Hives   • Haldol [Haloperidol] Hives   • Penicillins Hives   • Toradol [Ketorolac Tromethamine] Hives   • Tramadol Swelling       Past Surgical History:   Procedure Laterality Date   • ABDOMINAL SURGERY     • CARDIAC CATHETERIZATION     •  SECTION     • CHOLECYSTECTOMY     • COLONOSCOPY     • ENDOSCOPY     • RIGHT OOPHORECTOMY     • URETEROSCOPY LASER LITHOTRIPSY WITH STENT INSERTION Left 10/1/2021    Procedure: URETEROSCOPY WITH STENT PLACEMENT;  Surgeon: Milan Motley MD;  Location: [facility];  Service: Urology;  Laterality: Left;       Family History  "  Problem Relation Age of Onset   • No Known Problems Mother    • No Known Problems Father    • No Known Problems Sister    • No Known Problems Brother    • No Known Problems Son    • No Known Problems Daughter    • No Known Problems Maternal Grandmother    • No Known Problems Maternal Grandfather    • No Known Problems Paternal Grandmother    • No Known Problems Paternal Grandfather    • No Known Problems Cousin    • Rheum arthritis Neg Hx    • Osteoarthritis Neg Hx    • Asthma Neg Hx    • Diabetes Neg Hx    • Heart failure Neg Hx    • Hyperlipidemia Neg Hx    • Hypertension Neg Hx    • Migraines Neg Hx    • Rashes / Skin problems Neg Hx    • Seizures Neg Hx    • Stroke Neg Hx    • Thyroid disease Neg Hx        Social History     Socioeconomic History   • Marital status:    Tobacco Use   • Smoking status: Never Smoker   • Smokeless tobacco: Never Used   Vaping Use   • Vaping Use: Never used   Substance and Sexual Activity   • Alcohol use: No   • Drug use: Never     Comment: Pt has track marks on right arm. Pt states \"It's from my INR being drawn.\"    • Sexual activity: Defer           Objective   Physical Exam  Vitals and nursing note reviewed.   Constitutional:       Appearance: Normal appearance.   HENT:      Head: Normocephalic and atraumatic.   Cardiovascular:      Rate and Rhythm: Normal rate and regular rhythm.      Heart sounds: Normal heart sounds. No murmur heard.  No friction rub. No gallop.    Pulmonary:      Effort: Pulmonary effort is normal. No respiratory distress.      Breath sounds: Normal breath sounds. No wheezing, rhonchi or rales.   Abdominal:      General: Abdomen is flat. Bowel sounds are normal. There is no distension.      Palpations: Abdomen is soft.      Tenderness: There is no abdominal tenderness. There is right CVA tenderness (mild) and left CVA tenderness (mild).   Musculoskeletal:      Right lower leg: No edema.      Left lower leg: No edema.   Skin:     General: Skin is warm " and dry.   Neurological:      General: No focal deficit present.      Mental Status: She is alert and oriented to person, place, and time.   Psychiatric:         Mood and Affect: Mood normal.         Behavior: Behavior normal.         Procedures       Results for orders placed or performed during the hospital encounter of 10/17/21   Comprehensive Metabolic Panel    Specimen: Blood   Result Value Ref Range    Glucose 474 (C) 65 - 99 mg/dL    BUN 13 6 - 20 mg/dL    Creatinine 0.80 0.57 - 1.00 mg/dL    Sodium 131 (L) 136 - 145 mmol/L    Potassium 4.4 3.5 - 5.2 mmol/L    Chloride 92 (L) 98 - 107 mmol/L    CO2 20.6 (L) 22.0 - 29.0 mmol/L    Calcium 9.2 8.6 - 10.5 mg/dL    Total Protein 8.4 6.0 - 8.5 g/dL    Albumin 4.26 3.50 - 5.20 g/dL    ALT (SGPT) 27 1 - 33 U/L    AST (SGOT) 30 1 - 32 U/L    Alkaline Phosphatase 92 39 - 117 U/L    Total Bilirubin 0.2 0.0 - 1.2 mg/dL    eGFR Non African Amer 82 >60 mL/min/1.73    Globulin 4.1 gm/dL    A/G Ratio 1.0 g/dL    BUN/Creatinine Ratio 16.3 7.0 - 25.0    Anion Gap 18.4 (H) 5.0 - 15.0 mmol/L   Urinalysis With Culture If Indicated - Urine, Catheter In/Out    Specimen: Urine, Catheter In/Out   Result Value Ref Range    Color, UA Yellow Yellow, Straw    Appearance, UA Clear Clear    pH, UA 5.5 5.0 - 8.0    Specific Gravity, UA 1.021 1.005 - 1.030    Glucose, UA >=1000 mg/dL (3+) (A) Negative    Ketones, UA Negative Negative    Bilirubin, UA Negative Negative    Blood, UA Negative Negative    Protein, UA 30 mg/dL (1+) (A) Negative    Leuk Esterase, UA Small (1+) (A) Negative    Nitrite, UA Negative Negative    Urobilinogen, UA 0.2 E.U./dL 0.2 - 1.0 E.U./dL   Pregnancy, Urine - Urine, Catheter In/Out    Specimen: Urine, Catheter In/Out   Result Value Ref Range    HCG, Urine QL Negative Negative   Protime-INR    Specimen: Arm, Left; Blood   Result Value Ref Range    Protime 12.2 (L) 12.8 - 14.5 Seconds    INR 0.87 (L) 0.90 - 1.10   CBC Auto Differential    Specimen: Blood   Result  Value Ref Range    WBC 5.50 3.40 - 10.80 10*3/mm3    RBC 4.56 3.77 - 5.28 10*6/mm3    Hemoglobin 12.5 12.0 - 15.9 g/dL    Hematocrit 37.7 34.0 - 46.6 %    MCV 82.7 79.0 - 97.0 fL    MCH 27.4 26.6 - 33.0 pg    MCHC 33.2 31.5 - 35.7 g/dL    RDW 16.8 (H) 12.3 - 15.4 %    RDW-SD 49.9 37.0 - 54.0 fl    MPV 8.8 6.0 - 12.0 fL    Platelets 265 140 - 450 10*3/mm3    Neutrophil % 49.2 42.7 - 76.0 %    Lymphocyte % 38.4 19.6 - 45.3 %    Monocyte % 9.3 5.0 - 12.0 %    Eosinophil % 2.2 0.3 - 6.2 %    Basophil % 0.7 0.0 - 1.5 %    Immature Grans % 0.2 0.0 - 0.5 %    Neutrophils, Absolute 2.71 1.70 - 7.00 10*3/mm3    Lymphocytes, Absolute 2.11 0.70 - 3.10 10*3/mm3    Monocytes, Absolute 0.51 0.10 - 0.90 10*3/mm3    Eosinophils, Absolute 0.12 0.00 - 0.40 10*3/mm3    Basophils, Absolute 0.04 0.00 - 0.20 10*3/mm3    Immature Grans, Absolute 0.01 0.00 - 0.05 10*3/mm3    nRBC 0.0 0.0 - 0.2 /100 WBC   Urinalysis, Microscopic Only - Urine, Catheter In/Out    Specimen: Urine, Catheter In/Out   Result Value Ref Range    RBC, UA 0-2 None Seen, 0-2 /HPF    WBC, UA 31-50 (A) None Seen, 0-2 /HPF    Bacteria, UA None Seen None Seen /HPF    Squamous Epithelial Cells, UA 3-6 (A) None Seen, 0-2 /HPF    Hyaline Casts, UA None Seen None Seen /LPF    Methodology Automated Microscopy    POC Glucose Once    Specimen: Blood   Result Value Ref Range    Glucose 415 (C) 70 - 130 mg/dL     CT Abdomen Pelvis Without Contrast    Result Date: 10/13/2021  Narrative: CT ABDOMEN AND PELVIS, NONCONTRAST, 10/13/2021 HISTORY: 36-year-old female in the ED complaining of left side abdomen pain. History of several obstructing left ureter stone with ureteroscopy and laser lithotripsy on 10/1/2021. TECHNIQUE: CT imaging of the abdomen and pelvis, noncontrast kidney stone protocol. Radiation dose reduction techniques included automated exposure control. Radiation audit for CT and nuclear cardiology exams in the last 12 months: 9. COMPARISON: *  CT abdomen/pelvis,  8/29/2021. ABDOMEN FINDINGS: Left ureteral stent appears well-positioned. 8 mm calculus is again noted in the left renal pelvis alongside the stent. There is mild pyelocaliectasis and hilar edema with slight left renal enlargement. The left ureter is normal in caliber. Nonobstructing right renal calculus measuring about 8 mm. Moderate hepatomegaly. Liver, pancreas and spleen are otherwise unremarkable. Cholecystectomy. No bile duct dilatation. Small bowel and colon are normal in caliber and appearance, as imaged. Normal appendix. No gastric distention or distal esophageal dilatation. PELVIS FINDINGS: Uterus, adnexal regions, urinary bladder and rectum are within normal limits. Lung base images show no active disease.     Impression: 1.  Well-positioned left ureteral stent. 2.  8 mm calculus in the left renal pelvis. 3.  Mild left pyelocaliectasis and hilar edema and mild left renal enlargement. 4.  Nonobstructing right renal calculus. 5.  Cholecystectomy. Hepatomegaly. Signer Name: Esteban Banegas MD  Signed: 10/13/2021 5:31 AM  Workstation Name: [email or web address]  [facility]    CT Abdomen Pelvis Stone Protocol    Result Date: 10/17/2021  Narrative: CT Abdomen Pelvis WO INDICATION: Bilateral abdominal pain with hematuria TECHNIQUE: CT of the abdomen and pelvis without IV contrast. Coronal and sagittal reconstructions were obtained.  Radiation dose reduction techniques included automated exposure control or exposure modulation based on body size. Count of known CT and cardiac nuc med studies performed in previous 12 months: 10. COMPARISON: 10/13/2021 FINDINGS: Abdomen: The liver, spleen and pancreas are normal in size. There is a nonobstructing right kidney stone. The right ureter is nondilated. There is a double-J ureteral stent on the left that appears in satisfactory position, unchanged. There is a stone adjacent to the upper stent near the UPJ measuring 8 mm, unchanged. No evidence of  progressive calyceal distention to suggest stent obstruction. No retroperitoneal adenopathy. No distended bowel loops. No free air. Pelvis: No evidence of adenopathy, mass or fluid collection.     Impression: The double-J stent remains in satisfactory position. There is a stone adjacent to the upper aspect of the stent near the UPJ that is unchanged. No evidence of progressive hydronephrosis or worsening inflammatory change on either side. Signer Name: Elian Puente MD  Signed: 10/17/2021 9:24 AM  Workstation Name: [email or web address]  [facility] Surgery Fluoro    Result Date: 10/1/2021  Narrative: EXAMINATION: FL SURGERY FLUORO-  CLINICAL INDICATION: cysto ; N20.0-Calculus of kidney; N20.0-Calculus of kidney; N23-Unspecified renal colic; N20.1-Calculus of ureter   COMPARISON: None available.  Total fluoroscopy time 0.1 minutes.  Fluoroscopy was provided and 4 images were acquired as left ureter was cannulated.  For a full procedural report, please see the report provided by the performing physician.  This report was finalized on 10/1/2021 2:33 PM by Dr. Ajay De Guzman MD.      FL margie endo (surgery)    Result Date: 10/1/2021  Narrative: This procedure was auto-finalized with no dictation required.        ED Course  ED Course as of 10/17/21 1158   Sun Oct 17, 2021   0818 ECG 12 Lead  Sinus tachycardia.  Rate 123.  Normal QT interval.  Q waves inversions in lead III.  No ST elevation or depression.  Abnormal EKG. Interpreted by me. [BC]      ED Course User Index  [BC] Igor Shaw MD                                           [facility]    Final diagnoses:   Ureterolithiasis   Renal colic       ED Disposition  ED Disposition     ED Disposition Condition Comment    Discharge Stable           Milan Motley MD  [address] [facility]  [address]  [facility] [address]  [phone]    In 1 day  Tomorrow at 1 pm.         Medication List      New Prescriptions    HYDROcodone-acetaminophen  MG per  tablet  Commonly known as: NORCO  Take 1 tablet by mouth Every 6 (Six) Hours As Needed for Moderate Pain .           Where to Get Your Medications      These medications were sent to [facility] - [place], [place] - [address] [place] [address] - [phone]  - [phone] [address] [address]    Phone: [phone]   · HYDROcodone-acetaminophen  MG per tablet          Taras Shaw MD  10/17/21 8819

## 2021-10-18 LAB — BACTERIA SPEC AEROBE CULT: ABNORMAL

## 2021-10-20 ENCOUNTER — LAB (OUTPATIENT)
Dept: LAB | Facility: HOSPITAL | Age: 36
End: 2021-10-20

## 2021-10-20 ENCOUNTER — PRE-ADMISSION TESTING (OUTPATIENT)
Dept: PREADMISSION TESTING | Facility: HOSPITAL | Age: 36
End: 2021-10-20

## 2021-10-20 DIAGNOSIS — N20.0 KIDNEY STONE: ICD-10-CM

## 2021-10-20 PROCEDURE — U0004 COV-19 TEST NON-CDC HGH THRU: HCPCS | Performed by: UROLOGY

## 2021-10-20 PROCEDURE — C9803 HOPD COVID-19 SPEC COLLECT: HCPCS | Performed by: UROLOGY

## 2021-10-21 LAB — SARS-COV-2 RNA PNL SPEC NAA+PROBE: NOT DETECTED

## 2021-10-22 ENCOUNTER — ANESTHESIA EVENT (OUTPATIENT)
Dept: PERIOP | Facility: HOSPITAL | Age: 36
End: 2021-10-22

## 2021-10-22 ENCOUNTER — HOSPITAL ENCOUNTER (OUTPATIENT)
Facility: HOSPITAL | Age: 36
Discharge: HOME | End: 2021-10-22
Attending: UROLOGY | Admitting: UROLOGY

## 2021-10-22 ENCOUNTER — ANESTHESIA (OUTPATIENT)
Dept: PERIOP | Facility: HOSPITAL | Age: 36
End: 2021-10-22

## 2021-10-22 VITALS
HEART RATE: 104 BPM | HEIGHT: 64 IN | OXYGEN SATURATION: 94 % | BODY MASS INDEX: 49.61 KG/M2 | SYSTOLIC BLOOD PRESSURE: 141 MMHG | DIASTOLIC BLOOD PRESSURE: 98 MMHG | RESPIRATION RATE: 18 BRPM | TEMPERATURE: 98.8 F | WEIGHT: 290.6 LBS

## 2021-10-22 DIAGNOSIS — N20.0 RENAL CALCULUS, LEFT: ICD-10-CM

## 2021-10-22 DIAGNOSIS — N20.1 URETERAL CALCULUS: Primary | ICD-10-CM

## 2021-10-22 LAB
B-HCG UR QL: NEGATIVE
BACTERIA SPEC AEROBE CULT: NORMAL
BACTERIA SPEC AEROBE CULT: NORMAL
EXPIRATION DATE: ABNORMAL
GLUCOSE BLDC GLUCOMTR-MCNC: 266 MG/DL (ref 70–130)
INTERNAL NEGATIVE CONTROL: NEGATIVE
INTERNAL POSITIVE CONTROL: POSITIVE
Lab: ABNORMAL

## 2021-10-22 PROCEDURE — 25010000002 FENTANYL CITRATE (PF) 50 MCG/ML SOLUTION: Performed by: NURSE ANESTHETIST, CERTIFIED REGISTERED

## 2021-10-22 PROCEDURE — 25010000002 MIDAZOLAM PER 1 MG: Performed by: NURSE ANESTHETIST, CERTIFIED REGISTERED

## 2021-10-22 PROCEDURE — 81025 URINE PREGNANCY TEST: CPT | Performed by: ANESTHESIOLOGY

## 2021-10-22 PROCEDURE — 82962 GLUCOSE BLOOD TEST: CPT

## 2021-10-22 PROCEDURE — 25010000002 FENTANYL CITRATE (PF) 50 MCG/ML SOLUTION: Performed by: ANESTHESIOLOGY

## 2021-10-22 PROCEDURE — 25010000002 PROPOFOL 10 MG/ML EMULSION: Performed by: NURSE ANESTHETIST, CERTIFIED REGISTERED

## 2021-10-22 PROCEDURE — 50590 FRAGMENTING OF KIDNEY STONE: CPT | Performed by: UROLOGY

## 2021-10-22 PROCEDURE — 25010000002 ONDANSETRON PER 1 MG: Performed by: NURSE ANESTHETIST, CERTIFIED REGISTERED

## 2021-10-22 PROCEDURE — 25010000002 HYDRALAZINE PER 20 MG: Performed by: ANESTHESIOLOGY

## 2021-10-22 PROCEDURE — 25010000002 GENTAMICIN PER 80 MG: Performed by: UROLOGY

## 2021-10-22 RX ORDER — OXYCODONE HYDROCHLORIDE AND ACETAMINOPHEN 5; 325 MG/1; MG/1
1 TABLET ORAL ONCE
Status: COMPLETED | OUTPATIENT
Start: 2021-10-22 | End: 2021-10-22

## 2021-10-22 RX ORDER — IPRATROPIUM BROMIDE AND ALBUTEROL SULFATE 2.5; .5 MG/3ML; MG/3ML
3 SOLUTION RESPIRATORY (INHALATION) ONCE AS NEEDED
Status: DISCONTINUED | OUTPATIENT
Start: 2021-10-22 | End: 2021-10-22 | Stop reason: HOSPADM

## 2021-10-22 RX ORDER — HYDRALAZINE HYDROCHLORIDE 20 MG/ML
INJECTION INTRAMUSCULAR; INTRAVENOUS AS NEEDED
Status: DISCONTINUED | OUTPATIENT
Start: 2021-10-22 | End: 2021-10-22 | Stop reason: SURG

## 2021-10-22 RX ORDER — LIDOCAINE HYDROCHLORIDE 20 MG/ML
INJECTION, SOLUTION INFILTRATION; PERINEURAL AS NEEDED
Status: DISCONTINUED | OUTPATIENT
Start: 2021-10-22 | End: 2021-10-22 | Stop reason: SURG

## 2021-10-22 RX ORDER — FENTANYL CITRATE 50 UG/ML
INJECTION, SOLUTION INTRAMUSCULAR; INTRAVENOUS AS NEEDED
Status: DISCONTINUED | OUTPATIENT
Start: 2021-10-22 | End: 2021-10-22 | Stop reason: SURG

## 2021-10-22 RX ORDER — GENTAMICIN SULFATE 80 MG/100ML
80 INJECTION, SOLUTION INTRAVENOUS ONCE
Status: COMPLETED | OUTPATIENT
Start: 2021-10-22 | End: 2021-10-22

## 2021-10-22 RX ORDER — ONDANSETRON 2 MG/ML
4 INJECTION INTRAMUSCULAR; INTRAVENOUS AS NEEDED
Status: DISCONTINUED | OUTPATIENT
Start: 2021-10-22 | End: 2021-10-22 | Stop reason: HOSPADM

## 2021-10-22 RX ORDER — OXYCODONE AND ACETAMINOPHEN 10; 325 MG/1; MG/1
1 TABLET ORAL EVERY 4 HOURS PRN
Qty: 16 TABLET | Refills: 0 | Status: SHIPPED | OUTPATIENT
Start: 2021-10-22 | End: 2021-11-11 | Stop reason: SDUPTHER

## 2021-10-22 RX ORDER — SODIUM CHLORIDE, SODIUM LACTATE, POTASSIUM CHLORIDE, CALCIUM CHLORIDE 600; 310; 30; 20 MG/100ML; MG/100ML; MG/100ML; MG/100ML
100 INJECTION, SOLUTION INTRAVENOUS ONCE AS NEEDED
Status: DISCONTINUED | OUTPATIENT
Start: 2021-10-22 | End: 2021-10-22 | Stop reason: HOSPADM

## 2021-10-22 RX ORDER — SODIUM CHLORIDE 0.9 % (FLUSH) 0.9 %
10 SYRINGE (ML) INJECTION EVERY 12 HOURS SCHEDULED
Status: DISCONTINUED | OUTPATIENT
Start: 2021-10-22 | End: 2021-10-22 | Stop reason: HOSPADM

## 2021-10-22 RX ORDER — MEPERIDINE HYDROCHLORIDE 25 MG/ML
12.5 INJECTION INTRAMUSCULAR; INTRAVENOUS; SUBCUTANEOUS
Status: DISCONTINUED | OUTPATIENT
Start: 2021-10-22 | End: 2021-10-22 | Stop reason: HOSPADM

## 2021-10-22 RX ORDER — MIDAZOLAM HYDROCHLORIDE 1 MG/ML
1 INJECTION INTRAMUSCULAR; INTRAVENOUS
Status: DISCONTINUED | OUTPATIENT
Start: 2021-10-22 | End: 2021-10-22 | Stop reason: HOSPADM

## 2021-10-22 RX ORDER — FAMOTIDINE 10 MG/ML
INJECTION, SOLUTION INTRAVENOUS AS NEEDED
Status: DISCONTINUED | OUTPATIENT
Start: 2021-10-22 | End: 2021-10-22 | Stop reason: SURG

## 2021-10-22 RX ORDER — PROPOFOL 10 MG/ML
VIAL (ML) INTRAVENOUS AS NEEDED
Status: DISCONTINUED | OUTPATIENT
Start: 2021-10-22 | End: 2021-10-22 | Stop reason: SURG

## 2021-10-22 RX ORDER — SODIUM CHLORIDE, SODIUM LACTATE, POTASSIUM CHLORIDE, CALCIUM CHLORIDE 600; 310; 30; 20 MG/100ML; MG/100ML; MG/100ML; MG/100ML
125 INJECTION, SOLUTION INTRAVENOUS ONCE
Status: COMPLETED | OUTPATIENT
Start: 2021-10-22 | End: 2021-10-22

## 2021-10-22 RX ORDER — ONDANSETRON 2 MG/ML
INJECTION INTRAMUSCULAR; INTRAVENOUS AS NEEDED
Status: DISCONTINUED | OUTPATIENT
Start: 2021-10-22 | End: 2021-10-22 | Stop reason: SURG

## 2021-10-22 RX ORDER — FENTANYL CITRATE 50 UG/ML
50 INJECTION, SOLUTION INTRAMUSCULAR; INTRAVENOUS
Status: DISCONTINUED | OUTPATIENT
Start: 2021-10-22 | End: 2021-10-22 | Stop reason: HOSPADM

## 2021-10-22 RX ORDER — FENTANYL CITRATE 50 UG/ML
100 INJECTION, SOLUTION INTRAMUSCULAR; INTRAVENOUS ONCE
Status: COMPLETED | OUTPATIENT
Start: 2021-10-22 | End: 2021-10-22

## 2021-10-22 RX ORDER — MIDAZOLAM HYDROCHLORIDE 1 MG/ML
INJECTION INTRAMUSCULAR; INTRAVENOUS AS NEEDED
Status: DISCONTINUED | OUTPATIENT
Start: 2021-10-22 | End: 2021-10-22 | Stop reason: SURG

## 2021-10-22 RX ORDER — SODIUM CHLORIDE, SODIUM LACTATE, POTASSIUM CHLORIDE, CALCIUM CHLORIDE 600; 310; 30; 20 MG/100ML; MG/100ML; MG/100ML; MG/100ML
INJECTION, SOLUTION INTRAVENOUS CONTINUOUS PRN
Status: DISCONTINUED | OUTPATIENT
Start: 2021-10-22 | End: 2021-10-22 | Stop reason: SURG

## 2021-10-22 RX ORDER — SODIUM CHLORIDE 0.9 % (FLUSH) 0.9 %
10 SYRINGE (ML) INJECTION AS NEEDED
Status: DISCONTINUED | OUTPATIENT
Start: 2021-10-22 | End: 2021-10-22 | Stop reason: HOSPADM

## 2021-10-22 RX ADMIN — GENTAMICIN SULFATE 80 MG: 80 INJECTION, SOLUTION INTRAVENOUS at 09:55

## 2021-10-22 RX ADMIN — SODIUM CHLORIDE, POTASSIUM CHLORIDE, SODIUM LACTATE AND CALCIUM CHLORIDE: 600; 310; 30; 20 INJECTION, SOLUTION INTRAVENOUS at 09:56

## 2021-10-22 RX ADMIN — ONDANSETRON 4 MG: 2 INJECTION INTRAMUSCULAR; INTRAVENOUS at 09:50

## 2021-10-22 RX ADMIN — OXYCODONE HYDROCHLORIDE AND ACETAMINOPHEN 1 TABLET: 5; 325 TABLET ORAL at 11:25

## 2021-10-22 RX ADMIN — PROPOFOL 200 MG: 10 INJECTION, EMULSION INTRAVENOUS at 09:56

## 2021-10-22 RX ADMIN — MIDAZOLAM 2 MG: 1 INJECTION INTRAMUSCULAR; INTRAVENOUS at 09:50

## 2021-10-22 RX ADMIN — FAMOTIDINE 20 MG: 10 INJECTION INTRAVENOUS at 09:50

## 2021-10-22 RX ADMIN — METOPROLOL TARTRATE 2.5 MG: 1 INJECTION, SOLUTION INTRAVENOUS at 10:37

## 2021-10-22 RX ADMIN — HYDRALAZINE HYDROCHLORIDE 10 MG: 20 INJECTION INTRAMUSCULAR; INTRAVENOUS at 11:05

## 2021-10-22 RX ADMIN — LIDOCAINE HYDROCHLORIDE 60 MG: 20 INJECTION, SOLUTION INFILTRATION; PERINEURAL at 09:56

## 2021-10-22 RX ADMIN — HYDRALAZINE HYDROCHLORIDE 10 MG: 20 INJECTION INTRAMUSCULAR; INTRAVENOUS at 10:46

## 2021-10-22 RX ADMIN — FENTANYL CITRATE 50 MCG: 50 INJECTION INTRAMUSCULAR; INTRAVENOUS at 10:48

## 2021-10-22 RX ADMIN — FENTANYL CITRATE 100 MCG: 50 INJECTION INTRAMUSCULAR; INTRAVENOUS at 09:50

## 2021-10-22 RX ADMIN — FENTANYL CITRATE 100 MCG: 50 INJECTION INTRAMUSCULAR; INTRAVENOUS at 08:52

## 2021-10-22 RX ADMIN — SODIUM CHLORIDE, POTASSIUM CHLORIDE, SODIUM LACTATE AND CALCIUM CHLORIDE 125 ML/HR: 600; 310; 30; 20 INJECTION, SOLUTION INTRAVENOUS at 08:49

## 2021-10-22 NOTE — ANESTHESIA PROCEDURE NOTES
Airway  Urgency: elective    Date/Time: 10/22/2021 9:56 AM  Airway not difficult    General Information and Staff    Patient location during procedure: OR  Anesthesiologist: Oscar Smith MD  CRNA: Vilma Gill CRNA    Indications and Patient Condition  Indications for airway management: airway protection    Preoxygenated: yes  Mask difficulty assessment: 0 - not attempted    Final Airway Details  Final airway type: supraglottic airway      Successful airway: classic  Size 3    Number of attempts at approach: 1  Assessment: lips, teeth, and gum same as pre-op    Additional Comments  LMA placed with no trauma noted. Patient tolerated well. Good seal. Secured.

## 2021-10-22 NOTE — ANESTHESIA PREPROCEDURE EVALUATION
Anesthesia Evaluation     Patient summary reviewed and Nursing notes reviewed   NPO Solid Status: > 8 hours  NPO Liquid Status: > 8 hours           Airway   Mallampati: II  TM distance: >3 FB  Neck ROM: full  No difficulty expected  Dental    (+) poor dentition        Pulmonary     breath sounds clear to auscultation  (+) pulmonary embolism, asthma,  Cardiovascular     Rhythm: regular  Rate: normal    (+) hypertension, dysrhythmias, DVT, hyperlipidemia,       Neuro/Psych  (+) headaches, psychiatric history,     (-) TIA  GI/Hepatic/Renal/Endo    (+) obesity, morbid obesity, GERD,  renal disease, diabetes mellitus,     Musculoskeletal     Abdominal     Abdomen: soft.   Substance History      OB/GYN    (+) Preeclampsia, pregnancy induced hypertension        Other      history of cancer                    Anesthesia Plan    ASA 3     general     intravenous induction     Anesthetic plan, all risks, benefits, and alternatives have been provided, discussed and informed consent has been obtained with: patient.    Plan discussed with CRNA.

## 2021-10-22 NOTE — ANESTHESIA POSTPROCEDURE EVALUATION
Patient: Natalia Arzate    Procedure Summary     Date: 10/22/21 Room / Location: [facility] OR [address] [place] [address] COR OR    Anesthesia Start: 0950 Anesthesia Stop: 1029    Procedure: EXTRACORPOREAL SHOCKWAVE LITHOTRIPSY (Left ) Diagnosis:       Renal calculus, left      (Renal calculus, left [N20.0])    Surgeons: Milan Motley MD Provider: Oscar Smith MD    Anesthesia Type: general ASA Status: 3          Anesthesia Type: general    Vitals  Vitals Value Taken Time   /31 10/22/21 1044   Temp 99.2 °F (37.3 °C) 10/22/21 1029   Pulse 100 10/22/21 1044   Resp 20 10/22/21 1044   SpO2 94 % 10/22/21 1044           Post Anesthesia Care and Evaluation    Patient location during evaluation: PACU  Patient participation: complete - patient participated  Level of consciousness: sleepy but conscious  Pain score: 2  Pain management: adequate  Airway patency: patent  Anesthetic complications: No anesthetic complications  PONV Status: none  Cardiovascular status: blood pressure returned to baseline and hypertensive  Respiratory status: nasal cannula  Hydration status: acceptable

## 2021-11-11 ENCOUNTER — OFFICE VISIT (OUTPATIENT)
Dept: UROLOGY | Facility: CLINIC | Age: 36
End: 2021-11-11

## 2021-11-11 ENCOUNTER — HOSPITAL ENCOUNTER (OUTPATIENT)
Dept: GENERAL RADIOLOGY | Facility: HOSPITAL | Age: 36
Discharge: HOME | End: 2021-11-11
Admitting: UROLOGY

## 2021-11-11 VITALS — WEIGHT: 290 LBS | HEIGHT: 64 IN | BODY MASS INDEX: 49.51 KG/M2

## 2021-11-11 DIAGNOSIS — N20.1 URETERAL CALCULUS: ICD-10-CM

## 2021-11-11 DIAGNOSIS — N20.0 RENAL CALCULUS, LEFT: ICD-10-CM

## 2021-11-11 DIAGNOSIS — N20.0 KIDNEY STONE: ICD-10-CM

## 2021-11-11 DIAGNOSIS — N20.0 KIDNEY STONE: Primary | ICD-10-CM

## 2021-11-11 PROCEDURE — 52310 CYSTOSCOPY AND TREATMENT: CPT | Performed by: UROLOGY

## 2021-11-11 PROCEDURE — 74018 RADEX ABDOMEN 1 VIEW: CPT

## 2021-11-11 PROCEDURE — 99024 POSTOP FOLLOW-UP VISIT: CPT | Performed by: UROLOGY

## 2021-11-11 PROCEDURE — 74018 RADEX ABDOMEN 1 VIEW: CPT | Performed by: RADIOLOGY

## 2021-11-11 RX ORDER — OXYCODONE AND ACETAMINOPHEN 10; 325 MG/1; MG/1
1 TABLET ORAL EVERY 4 HOURS PRN
Qty: 16 TABLET | Refills: 0 | Status: SHIPPED | OUTPATIENT
Start: 2021-11-11 | End: 2022-02-18

## 2021-11-11 NOTE — PROGRESS NOTES
Answers for HPI/ROS submitted by the patient on 11/9/2021  Please describe your symptoms.: Having bad pains in my left side and back by my kidneys  Have you had these symptoms before?: Yes  How long have you been having these symptoms?: Greater than 2 weeks  Please list any medications you are currently taking for this condition.: Percocet as needed  Please describe any probable cause for these symptoms. : Kidney stones  What is the primary reason for your visit?: Other    Chief Complaint:          Chief Complaint   Patient presents with   • Post-op Follow-up       HPI:   36 y.o. female. With a history of hydronephrosis and an obstructing stone she is here for stent removal KUB showed no evidence of radiopaque stones along the course of the stent she was in prepped and draped in a sterile fashion had the stent removed I will see her back in 4 weeks    Past Medical History:        Past Medical History:   Diagnosis Date   • A-fib (HCC)    • Abnormal ECG    • Anemia    • Anxiety    • Asthma    • Cancer (HCC)     Ovarian   • Depression    • Diabetes mellitus (HCC)    • DVT (deep venous thrombosis) (HCC)    • DVT (deep venous thrombosis) (HCC)    • Factor 5 Leiden mutation, heterozygous (HCC)    • Fibroid    • GERD (gastroesophageal reflux disease)    • Gestational diabetes    • Gout    • Hyperlipidemia    • Hypothyroid    • Kidney stone    • Migraines    • Neuropathy    • Ovarian cancer (HCC) Feb 2021   • Ovarian cyst    • PE (pulmonary embolism)    • Polycystic ovary syndrome    • Preeclampsia    • Rh incompatibility    • TIA (transient ischemic attack)    • Urinary tract infection    • Varicella          Current Meds:     Current Outpatient Medications   Medication Sig Dispense Refill   • cholecalciferol (VITAMIN D3) 1000 units tablet Take 2,000 Units by mouth Every Night.     • folic acid (FOLVITE) 1 MG tablet Take 1 mg by mouth Daily.     • hydroCHLOROthiazide (HYDRODIURIL) 25 MG tablet Take 25 mg by mouth Daily.      • HYDROcodone-acetaminophen (NORCO)  MG per tablet Take 1 tablet by mouth Every 6 (Six) Hours As Needed for Moderate Pain . 12 tablet 0   • insulin lispro (humaLOG) 100 UNIT/ML injection Inject 25 Units under the skin into the appropriate area as directed Daily.     • metoprolol tartrate (LOPRESSOR) 25 MG tablet Take 25 mg by mouth 2 (Two) Times a Day.     • Omega-3 Fatty Acids (fish oil) 1000 MG capsule capsule Take  by mouth Daily With Breakfast.     • oxyCODONE-acetaminophen (Percocet)  MG per tablet Take 1 tablet by mouth Every 4 (Four) Hours As Needed for Moderate Pain. 16 tablet 0   • promethazine (PHENERGAN) 25 MG tablet Take 1 tablet by mouth Every 6 (Six) Hours As Needed for Nausea or Vomiting. 21 tablet 2   • vitamin B-12 (CYANOCOBALAMIN) 100 MCG tablet Take 50 mcg by mouth Daily.     • vitamin C (ASCORBIC ACID) 250 MG tablet Take 250 mg by mouth Daily.     • warfarin (COUMADIN) 5 MG tablet Take 5 mg by mouth Daily.       No current facility-administered medications for this visit.        Allergies:      Allergies   Allergen Reactions   • Amoxicillin Hives   • Haldol [Haloperidol] Hives   • Penicillins Hives   • Toradol [Ketorolac Tromethamine] Hives   • Tramadol Swelling        Past Surgical History:     Past Surgical History:   Procedure Laterality Date   • ABDOMINAL SURGERY     • CARDIAC CATHETERIZATION     •  SECTION     • CHOLECYSTECTOMY     • COLONOSCOPY     • ENDOSCOPY     • EXTRACORPOREAL SHOCK WAVE LITHOTRIPSY (ESWL) Left 10/22/2021    Procedure: EXTRACORPOREAL SHOCKWAVE LITHOTRIPSY;  Surgeon: Milan Motley MD;  Location: [facility];  Service: Urology;  Laterality: Left;   • LITHOTRIPSY     • RIGHT OOPHORECTOMY     • URETEROSCOPY LASER LITHOTRIPSY WITH STENT INSERTION Left 10/1/2021    Procedure: URETEROSCOPY WITH STENT PLACEMENT;  Surgeon: Milan Motley MD;  Location: [facility];  Service: Urology;  Laterality: Left;         Social History:     Social  "History     Socioeconomic History   • Marital status:    Tobacco Use   • Smoking status: Never Smoker   • Smokeless tobacco: Never Used   Vaping Use   • Vaping Use: Never used   Substance and Sexual Activity   • Alcohol use: No   • Drug use: Never     Comment: Pt has track marks on right arm. Pt states \"It's from my INR being drawn.\"    • Sexual activity: Not Currently     Partners: Male     Birth control/protection: None       Family History:     Family History   Problem Relation Age of Onset   • No Known Problems Mother    • No Known Problems Father    • No Known Problems Sister    • No Known Problems Brother    • No Known Problems Son    • No Known Problems Daughter    • No Known Problems Maternal Grandmother    • No Known Problems Maternal Grandfather    • No Known Problems Paternal Grandmother    • No Known Problems Paternal Grandfather    • No Known Problems Cousin    • Rheum arthritis Neg Hx    • Osteoarthritis Neg Hx    • Asthma Neg Hx    • Diabetes Neg Hx    • Heart failure Neg Hx    • Hyperlipidemia Neg Hx    • Hypertension Neg Hx    • Migraines Neg Hx    • Rashes / Skin problems Neg Hx    • Seizures Neg Hx    • Stroke Neg Hx    • Thyroid disease Neg Hx        Review of Systems:     Review of Systems   Constitutional: Negative.  Negative for activity change, appetite change, chills, diaphoresis, fatigue and unexpected weight change.   HENT: Negative for congestion, dental problem, drooling, ear discharge, ear pain, facial swelling, hearing loss, mouth sores, nosebleeds, postnasal drip, rhinorrhea, sinus pressure, sneezing, sore throat, tinnitus, trouble swallowing and voice change.    Eyes: Negative.  Negative for photophobia, pain, discharge, redness, itching and visual disturbance.   Respiratory: Negative.  Negative for apnea, cough, choking, chest tightness, shortness of breath, wheezing and stridor.    Cardiovascular: Negative.  Negative for chest pain, palpitations and leg swelling. "   Gastrointestinal: Negative.  Negative for abdominal distention, abdominal pain, anal bleeding, blood in stool, constipation, diarrhea, nausea, rectal pain and vomiting.   Endocrine: Negative.  Negative for cold intolerance, heat intolerance, polydipsia, polyphagia and polyuria.   Musculoskeletal: Negative.  Negative for arthralgias, back pain, gait problem, joint swelling, myalgias, neck pain and neck stiffness.   Skin: Negative.  Negative for color change, pallor, rash and wound.   Allergic/Immunologic: Negative.  Negative for environmental allergies, food allergies and immunocompromised state.   Neurological: Negative.  Negative for dizziness, tremors, seizures, syncope, facial asymmetry, speech difficulty, weakness, light-headedness, numbness and headaches.   Hematological: Negative.  Negative for adenopathy. Does not bruise/bleed easily.   Psychiatric/Behavioral: Negative for agitation, behavioral problems, confusion, decreased concentration, dysphoric mood, hallucinations, self-injury, sleep disturbance and suicidal ideas. The patient is not nervous/anxious and is not hyperactive.    All other systems reviewed and are negative.      Physical Exam:     Physical Exam  Constitutional:       Appearance: She is well-developed.   HENT:      Head: Normocephalic and atraumatic.      Right Ear: External ear normal.      Left Ear: External ear normal.   Eyes:      Conjunctiva/sclera: Conjunctivae normal.      Pupils: Pupils are equal, round, and reactive to light.   Cardiovascular:      Rate and Rhythm: Normal rate and regular rhythm.      Heart sounds: Normal heart sounds.   Pulmonary:      Effort: Pulmonary effort is normal.      Breath sounds: Normal breath sounds.   Abdominal:      General: Bowel sounds are normal. There is no distension.      Palpations: Abdomen is soft. There is no mass.      Tenderness: There is no abdominal tenderness. There is no guarding or rebound.   Genitourinary:     Vagina: No vaginal  discharge.   Musculoskeletal:         General: Normal range of motion.   Skin:     General: Skin is warm and dry.   Neurological:      Mental Status: She is alert.      Deep Tendon Reflexes: Reflexes are normal and symmetric.   Psychiatric:         Behavior: Behavior normal.         Thought Content: Thought content normal.         Judgment: Judgment normal.         I have reviewed the following portions of the patient's history: Allergies, current medications, past family history, past medical history, past social history, past surgical history, problem list, and ROS and confirm it is accurate.      Procedure:   Cystoscopy and stent removal: Following an appropriate informed consent including the risk of infection, the patient was pretreated with 80 mg of intramuscular gentamicin.  Following this, the patient was prepped and draped in a sterile fashion using the flexible cystoscope. I went ahead and examined the bladder, identified the stent exiting the ureteral orifice.  It was grasped with grasping forceps and removed without complication.  The patient tolerated it well.      Assessment/Plan:   Renal calculus-we discussed the presence of the stone. We discussed the various therapeutic options available including percutaneous nephrostolithotomy and lithotripsy.  We discussed the risks of lithotripsy including the passage of stones, the development of a large string of stones in the distal ureter known as Steinstrasse.  In the 3% incidence of that we will need to proceed with a ureteroscopy for obstructing fragments.  Extremely rare incidence of renal hematoma and the significance of this.  We discussed the absolute relative indicators for intervention including the presence of sepsis and pain we cannot control ais the primary need for urgent intervention.  We discussed placement of a stent if indicated and the management of the stent as well. Status post lithotripsy and a stent placed today she had her stent  removed she is doing well I will see her back in 1 month                This document has been electronically signed by BRITTANY ELIZONDO MD November 11, 2021 07:57 EST

## 2022-02-18 ENCOUNTER — OFFICE VISIT (OUTPATIENT)
Dept: FAMILY MEDICINE CLINIC | Facility: CLINIC | Age: 37
End: 2022-02-18

## 2022-02-18 DIAGNOSIS — R11.0 NAUSEA: ICD-10-CM

## 2022-02-18 DIAGNOSIS — Z09 HOSPITAL DISCHARGE FOLLOW-UP: Primary | ICD-10-CM

## 2022-02-18 DIAGNOSIS — N17.9 ACUTE RENAL FAILURE, UNSPECIFIED ACUTE RENAL FAILURE TYPE: ICD-10-CM

## 2022-02-18 DIAGNOSIS — M10.9 GOUT, UNSPECIFIED CAUSE, UNSPECIFIED CHRONICITY, UNSPECIFIED SITE: Chronic | ICD-10-CM

## 2022-02-18 DIAGNOSIS — E11.65 TYPE 2 DIABETES MELLITUS WITH HYPERGLYCEMIA, WITH LONG-TERM CURRENT USE OF INSULIN: Chronic | ICD-10-CM

## 2022-02-18 DIAGNOSIS — I10 PRIMARY HYPERTENSION: Chronic | ICD-10-CM

## 2022-02-18 DIAGNOSIS — D68.51 FACTOR V LEIDEN: Chronic | ICD-10-CM

## 2022-02-18 DIAGNOSIS — N12 PYELONEPHRITIS OF LEFT KIDNEY: ICD-10-CM

## 2022-02-18 DIAGNOSIS — F43.10 PTSD (POST-TRAUMATIC STRESS DISORDER): ICD-10-CM

## 2022-02-18 DIAGNOSIS — I63.9 ISCHEMIC CEREBROVASCULAR ACCIDENT (CVA): ICD-10-CM

## 2022-02-18 DIAGNOSIS — M54.16 LEFT LUMBAR RADICULOPATHY: ICD-10-CM

## 2022-02-18 DIAGNOSIS — Z79.4 TYPE 2 DIABETES MELLITUS WITH HYPERGLYCEMIA, WITH LONG-TERM CURRENT USE OF INSULIN: Chronic | ICD-10-CM

## 2022-02-18 PROCEDURE — 99204 OFFICE O/P NEW MOD 45 MIN: CPT | Performed by: NURSE PRACTITIONER

## 2022-02-18 RX ORDER — GABAPENTIN 300 MG/1
300 CAPSULE ORAL 2 TIMES DAILY
COMMUNITY
Start: 2022-02-15 | End: 2022-03-17 | Stop reason: SDUPTHER

## 2022-02-18 RX ORDER — INSULIN GLARGINE 100 [IU]/ML
25 INJECTION, SOLUTION SUBCUTANEOUS DAILY
COMMUNITY
Start: 2022-02-15 | End: 2022-07-06 | Stop reason: SDUPTHER

## 2022-02-18 RX ORDER — BLOOD-GLUCOSE METER
KIT MISCELLANEOUS
COMMUNITY
Start: 2022-02-14 | End: 2022-03-14

## 2022-02-18 RX ORDER — PROCHLORPERAZINE 25 MG/1
1 SUPPOSITORY RECTAL DAILY
Qty: 1 EACH | Refills: 0 | Status: SHIPPED | OUTPATIENT
Start: 2022-02-18 | End: 2022-03-14

## 2022-02-18 RX ORDER — AMLODIPINE BESYLATE 10 MG/1
10 TABLET ORAL DAILY
COMMUNITY
Start: 2022-02-16 | End: 2022-04-28 | Stop reason: SDUPTHER

## 2022-02-18 RX ORDER — CEFDINIR 300 MG/1
300 CAPSULE ORAL
COMMUNITY
Start: 2022-02-15 | End: 2022-03-01

## 2022-02-18 RX ORDER — NALOXONE HYDROCHLORIDE 4 MG/.1ML
4 SPRAY NASAL
COMMUNITY
Start: 2022-02-15 | End: 2022-03-14

## 2022-02-18 RX ORDER — SULFAMETHOXAZOLE AND TRIMETHOPRIM 800; 160 MG/1; MG/1
1 TABLET ORAL
COMMUNITY
Start: 2022-02-15 | End: 2022-03-01

## 2022-02-18 RX ORDER — FLURBIPROFEN SODIUM 0.3 MG/ML
SOLUTION/ DROPS OPHTHALMIC
COMMUNITY
Start: 2022-02-15 | End: 2022-03-14

## 2022-02-18 RX ORDER — PROCHLORPERAZINE 25 MG/1
SUPPOSITORY RECTAL
Qty: 9 EACH | Refills: 3 | Status: SHIPPED | OUTPATIENT
Start: 2022-02-18 | End: 2022-03-14

## 2022-02-18 RX ORDER — OXYCODONE HYDROCHLORIDE 15 MG/1
15 TABLET ORAL
COMMUNITY
Start: 2022-02-15 | End: 2022-02-18

## 2022-02-18 RX ORDER — DULOXETIN HYDROCHLORIDE 20 MG/1
20 CAPSULE, DELAYED RELEASE ORAL DAILY
COMMUNITY
Start: 2022-02-16 | End: 2022-03-18

## 2022-02-18 RX ORDER — PROCHLORPERAZINE 25 MG/1
1 SUPPOSITORY RECTAL DAILY
Qty: 1 EACH | Refills: 3 | Status: SHIPPED | OUTPATIENT
Start: 2022-02-18 | End: 2022-03-14

## 2022-02-18 RX ORDER — LANCETS
1 EACH MISCELLANEOUS AS NEEDED
COMMUNITY
End: 2022-02-18

## 2022-02-18 NOTE — PROGRESS NOTES
You have chosen to receive care through a telehealth visit.  Do you consent to use a video/audio connection for your medical care today? Yes    History of Present Illness  Natalia Arzate is a 36 y.o. female who presents to the clinic via video. She was hospitalized at [facility] after a very lengthy hospitalization from 12/26/2021 until she was discharged on 2/15/2022.  She was admitted for a disseminated infection following a one month febrile illness and fall at home. She presented emergently from [facility] following a fall at home and was found to have L Pyelonephritis complicated by multiple abscesses as well as superinfection of L iliopsoas hematoma on admission at [facility]. During admission, she developed acute hypoxic respiratory failure presumed due to aspiration and septic shock. She was transferred to ICU. She was intubated and required CRRT/HD while in ICU. Eventually transferred to the floor no longer requiring HD.   During hospitalization, she had an acute/subacute R cerebellar ischemic stroke on 1/26/2022 with recommendation to continue on Anticoagulation.   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 She did require multiple transfusions during her hospitalization, HTN, and PTSD.     The following portions of the patient's history were reviewed and updated as appropriate: allergies, current medications, past family history, past medical history, past social history, past surgical history and problem list.    Review of Systems   Constitutional: Positive for activity change and fatigue. Negative for appetite change, chills, fever and unexpected weight change.   HENT: Negative.    Eyes: Negative for visual disturbance.   Respiratory: Negative for cough, shortness of breath and wheezing.    Cardiovascular: Positive for leg swelling. Negative for chest pain and palpitations.   Gastrointestinal: Positive for  "nausea. Negative for constipation, diarrhea and vomiting.   Endocrine: Negative for cold intolerance, heat intolerance, polydipsia, polyphagia and polyuria.   Genitourinary:        Urinary Incontinence intermittently secondary to long term catheter while hospitalized    Musculoskeletal: Positive for arthralgias and gait problem.   Skin: Positive for wound (Sacral Pressure injury). Negative for color change and rash.   Neurological: Positive for dizziness, weakness and numbness. Negative for tremors, speech difficulty, light-headedness and headaches.   Hematological: Negative for adenopathy.   Psychiatric/Behavioral: Positive for sleep disturbance. Negative for confusion and decreased concentration. The patient is not nervous/anxious.    All other systems reviewed and are negative.    Vital signs:  Pulse 94   Ht 162.6 cm (64\")   Wt 96.2 kg (212 lb)   SpO2 98%   BMI 36.39 kg/m²     Physical Exam  Constitutional:       General: She is not in acute distress.  HENT:      Head: Normocephalic.      Nose: Nose normal.   Eyes:      General: No scleral icterus.        Right eye: No discharge.         Left eye: No discharge.      Conjunctiva/sclera: Conjunctivae normal.   Pulmonary:      Effort: Pulmonary effort is normal. No respiratory distress.   Musculoskeletal:      Cervical back: Neck supple.   Neurological:      Mental Status: She is alert.   Psychiatric:         Mood and Affect: Mood and affect normal.         Speech: Speech normal.         Behavior: Behavior is cooperative.     Result Review : Extensive [facility] Records   Patient's Body mass index is 36.39 kg/m². indicating that she is obese (BMI >30). Obesity-related health conditions include the following: hypertension and diabetes mellitus. Obesity is lost 100 lbs during hospitalization . BMI is is above average; BMI management plan is completed. We discussed portion control and increasing exercise..       Assessment/Plan     Diagnoses and all orders for this " visit:    1. Hospital discharge follow-up (Primary)  Comments:   Medical records reviewed and discussed with Natalia    2. Type 2 diabetes mellitus with hyperglycemia, with long-term current use of insulin ([facility])  Comments:  Continue Lantus DexCom prescribed   Orders:  -     Continuous Blood Gluc Transmit (Dexcom G6 Transmitter) misc; 1 Device Daily for 90 days.  Dispense: 1 each; Refill: 3  -     Continuous Blood Gluc Sensor (Dexcom G6 Sensor); Every 10 (Ten) Days.  Dispense: 9 each; Refill: 3  -     Continuous Blood Gluc  (Dexcom G6 ) device; 1 Device Daily.  Dispense: 1 each; Refill: 0  -     Comprehensive Metabolic Panel; Future  -     Vitamin B12; Future    3. Primary hypertension  Comments:  Continue Amlodipine with goal of titration Perhaps adding ARB if renal function allows    4. Gout, unspecified cause, unspecified chronicity, unspecified site  Comments:  Uric acid ordered   Orders:  -     Uric Acid; Future  -     Vitamin D 25 Hydroxy; Future    5. Factor V Leiden ([facility])  Comments:  Referral to Hematology Continue Eliquis  Orders:  -     Ambulatory Referral to Hematology    6. Ischemic cerebrovascular accident (CVA) ([facility])  Comments:  Continue Eliquis Monitor     7. Pyelonephritis of left kidney  Comments:  Referral to Urology for Ureteral stent exchanged/removed in March  Orders:  -     Ambulatory Referral to Urology  -     CBC & Differential; Future    8. Left lumbar radiculopathy  Comments:  Cymbalta and Gabapentin continued     9. Acute renal failure, unspecified acute renal failure type ([facility])  Comments:  Renal function returned to normal prior to discharge. Will continue to monitor     10. PTSD (post-traumatic stress disorder)  Comments:  Cymbalta started Will offer Behavioral Health referral     11. Nausea  Comments:  Zofran prescribed   Orders:  -     ondansetron ODT (Zofran ODT) 4 MG disintegrating tablet; Place 1 tablet on the tongue Every 8 (Eight) Hours As Needed for Nausea or  Vomiting.  Dispense: 30 tablet; Refill: 2  -     Magnesium; Future      I spent 40 minutes caring for Natalia on this date of service. This time includes time spent by me in the following activities:preparing for the visit, reviewing tests, obtaining and/or reviewing a separately obtained history, counseling and educating the patient/family/caregiver, ordering medications, tests, or procedures, referring and communicating with other health care professionals  and independently interpreting results and communicating that information with the patient/family/caregiver    Follow Up In two weeks   Findings and recommendations discussed with Natalia. Reviewed [facility] records. Lifestyle modifications reinforced including nutrition and activity recommendations. Will follow up in March sooner if problems/concerns occur.    This document has been electronically signed by:

## 2022-02-21 VITALS — BODY MASS INDEX: 36.19 KG/M2 | HEIGHT: 64 IN | HEART RATE: 94 BPM | WEIGHT: 212 LBS | OXYGEN SATURATION: 98 %

## 2022-02-21 RX ORDER — ONDANSETRON 4 MG/1
4 TABLET, ORALLY DISINTEGRATING ORAL EVERY 8 HOURS PRN
Qty: 30 TABLET | Refills: 2 | Status: SHIPPED | OUTPATIENT
Start: 2022-02-21 | End: 2022-03-14

## 2022-02-25 ENCOUNTER — LAB REQUISITION (OUTPATIENT)
Dept: LAB | Facility: HOSPITAL | Age: 37
End: 2022-02-25

## 2022-02-25 DIAGNOSIS — D64.9 ANEMIA, UNSPECIFIED TYPE: Primary | ICD-10-CM

## 2022-02-25 DIAGNOSIS — E87.1 HYPONATREMIA: ICD-10-CM

## 2022-02-25 DIAGNOSIS — E03.9 HYPOTHYROIDISM, UNSPECIFIED: ICD-10-CM

## 2022-02-25 DIAGNOSIS — D68.51 ACTIVATED PROTEIN C RESISTANCE: ICD-10-CM

## 2022-02-25 DIAGNOSIS — D64.9 ANEMIA, UNSPECIFIED: ICD-10-CM

## 2022-02-25 DIAGNOSIS — E11.42 TYPE 2 DIABETES MELLITUS WITH DIABETIC POLYNEUROPATHY: ICD-10-CM

## 2022-02-25 DIAGNOSIS — E55.9 VITAMIN D DEFICIENCY: Primary | ICD-10-CM

## 2022-02-25 DIAGNOSIS — B95.62 METHICILLIN RESISTANT STAPHYLOCOCCUS AUREUS INFECTION AS THE CAUSE OF DISEASES CLASSIFIED ELSEWHERE: ICD-10-CM

## 2022-02-25 DIAGNOSIS — J45.30 MILD PERSISTENT ASTHMA, UNCOMPLICATED: ICD-10-CM

## 2022-02-25 DIAGNOSIS — I10 ESSENTIAL (PRIMARY) HYPERTENSION: ICD-10-CM

## 2022-02-25 LAB
25(OH)D3 SERPL-MCNC: 7.7 NG/ML
ALBUMIN SERPL-MCNC: 3.97 G/DL (ref 3.5–5.2)
ALBUMIN/GLOB SERPL: 0.9 G/DL
ALP SERPL-CCNC: 155 U/L (ref 39–117)
ALT SERPL W P-5'-P-CCNC: 18 U/L (ref 1–33)
ANION GAP SERPL CALCULATED.3IONS-SCNC: 15.3 MMOL/L (ref 5–15)
AST SERPL-CCNC: 14 U/L (ref 1–32)
BASOPHILS # BLD AUTO: 0.02 10*3/MM3 (ref 0–0.2)
BASOPHILS NFR BLD AUTO: 0.3 % (ref 0–1.5)
BILIRUB SERPL-MCNC: 0.3 MG/DL (ref 0–1.2)
BUN SERPL-MCNC: 19 MG/DL (ref 6–20)
BUN/CREAT SERPL: 23.8 (ref 7–25)
CALCIUM SPEC-SCNC: 10.1 MG/DL (ref 8.6–10.5)
CHLORIDE SERPL-SCNC: 94 MMOL/L (ref 98–107)
CO2 SERPL-SCNC: 20.7 MMOL/L (ref 22–29)
CREAT SERPL-MCNC: 0.8 MG/DL (ref 0.57–1)
DEPRECATED RDW RBC AUTO: 47.8 FL (ref 37–54)
EOSINOPHIL # BLD AUTO: 0.17 10*3/MM3 (ref 0–0.4)
EOSINOPHIL NFR BLD AUTO: 2.6 % (ref 0.3–6.2)
ERYTHROCYTE [DISTWIDTH] IN BLOOD BY AUTOMATED COUNT: 14.4 % (ref 12.3–15.4)
GFR SERPL CREATININE-BSD FRML MDRD: 82 ML/MIN/1.73
GLOBULIN UR ELPH-MCNC: 4.4 GM/DL
GLUCOSE SERPL-MCNC: 181 MG/DL (ref 65–99)
HCT VFR BLD AUTO: 27.6 % (ref 34–46.6)
HGB BLD-MCNC: 9.6 G/DL (ref 12–15.9)
IMM GRANULOCYTES # BLD AUTO: 0.01 10*3/MM3 (ref 0–0.05)
IMM GRANULOCYTES NFR BLD AUTO: 0.2 % (ref 0–0.5)
LYMPHOCYTES # BLD AUTO: 1.7 10*3/MM3 (ref 0.7–3.1)
LYMPHOCYTES NFR BLD AUTO: 25.5 % (ref 19.6–45.3)
MAGNESIUM SERPL-MCNC: 2.1 MG/DL (ref 1.6–2.6)
MCH RBC QN AUTO: 31.7 PG (ref 26.6–33)
MCHC RBC AUTO-ENTMCNC: 34.8 G/DL (ref 31.5–35.7)
MCV RBC AUTO: 91.1 FL (ref 79–97)
MONOCYTES # BLD AUTO: 0.62 10*3/MM3 (ref 0.1–0.9)
MONOCYTES NFR BLD AUTO: 9.3 % (ref 5–12)
NEUTROPHILS NFR BLD AUTO: 4.14 10*3/MM3 (ref 1.7–7)
NEUTROPHILS NFR BLD AUTO: 62.1 % (ref 42.7–76)
NRBC BLD AUTO-RTO: 0 /100 WBC (ref 0–0.2)
PLATELET # BLD AUTO: 339 10*3/MM3 (ref 140–450)
PMV BLD AUTO: 8.1 FL (ref 6–12)
POTASSIUM SERPL-SCNC: 3.9 MMOL/L (ref 3.5–5.2)
PROT SERPL-MCNC: 8.4 G/DL (ref 6–8.5)
RBC # BLD AUTO: 3.03 10*6/MM3 (ref 3.77–5.28)
SODIUM SERPL-SCNC: 130 MMOL/L (ref 136–145)
URATE SERPL-MCNC: 6.4 MG/DL (ref 2.4–5.7)
VIT B12 BLD-MCNC: 175 PG/ML (ref 211–946)
WBC NRBC COR # BLD: 6.66 10*3/MM3 (ref 3.4–10.8)

## 2022-02-25 PROCEDURE — 84550 ASSAY OF BLOOD/URIC ACID: CPT | Performed by: NURSE PRACTITIONER

## 2022-02-25 PROCEDURE — 80053 COMPREHEN METABOLIC PANEL: CPT | Performed by: NURSE PRACTITIONER

## 2022-02-25 PROCEDURE — 85025 COMPLETE CBC W/AUTO DIFF WBC: CPT | Performed by: NURSE PRACTITIONER

## 2022-02-25 PROCEDURE — 82306 VITAMIN D 25 HYDROXY: CPT | Performed by: NURSE PRACTITIONER

## 2022-02-25 PROCEDURE — 82607 VITAMIN B-12: CPT | Performed by: NURSE PRACTITIONER

## 2022-02-25 PROCEDURE — 83735 ASSAY OF MAGNESIUM: CPT | Performed by: NURSE PRACTITIONER

## 2022-02-25 RX ORDER — ERGOCALCIFEROL 1.25 MG/1
50000 CAPSULE ORAL WEEKLY
Qty: 5 CAPSULE | Refills: 3 | Status: SHIPPED | OUTPATIENT
Start: 2022-02-25 | End: 2022-03-14

## 2022-02-25 RX ORDER — LANOLIN ALCOHOL/MO/W.PET/CERES
325 CREAM (GRAM) TOPICAL 2 TIMES DAILY
Qty: 60 TABLET | Refills: 5 | Status: SHIPPED | OUTPATIENT
Start: 2022-02-25 | End: 2022-03-14

## 2022-02-26 DIAGNOSIS — E53.8 VITAMIN B12 DEFICIENCY: Primary | ICD-10-CM

## 2022-03-01 ENCOUNTER — APPOINTMENT (OUTPATIENT)
Dept: CT IMAGING | Facility: HOSPITAL | Age: 37
End: 2022-03-01

## 2022-03-01 ENCOUNTER — APPOINTMENT (OUTPATIENT)
Dept: GENERAL RADIOLOGY | Facility: HOSPITAL | Age: 37
End: 2022-03-01

## 2022-03-01 ENCOUNTER — HOSPITAL ENCOUNTER (EMERGENCY)
Facility: HOSPITAL | Age: 37
Discharge: HOME | End: 2022-03-01
Attending: STUDENT IN AN ORGANIZED HEALTH CARE EDUCATION/TRAINING PROGRAM | Admitting: STUDENT IN AN ORGANIZED HEALTH CARE EDUCATION/TRAINING PROGRAM

## 2022-03-01 VITALS
WEIGHT: 220 LBS | HEART RATE: 82 BPM | SYSTOLIC BLOOD PRESSURE: 133 MMHG | RESPIRATION RATE: 17 BRPM | HEIGHT: 64 IN | BODY MASS INDEX: 37.56 KG/M2 | TEMPERATURE: 98.2 F | DIASTOLIC BLOOD PRESSURE: 76 MMHG | OXYGEN SATURATION: 99 %

## 2022-03-01 DIAGNOSIS — R10.9 LEFT FLANK PAIN: Primary | ICD-10-CM

## 2022-03-01 LAB
A-A DO2: 8.8 MMHG (ref 0–300)
ALBUMIN SERPL-MCNC: 3.74 G/DL (ref 3.5–5.2)
ALBUMIN/GLOB SERPL: 0.8 G/DL
ALP SERPL-CCNC: 148 U/L (ref 39–117)
ALT SERPL W P-5'-P-CCNC: 15 U/L (ref 1–33)
AMYLASE SERPL-CCNC: 79 U/L (ref 28–100)
ANION GAP SERPL CALCULATED.3IONS-SCNC: 16.3 MMOL/L (ref 5–15)
APTT PPP: 33.6 SECONDS (ref 25.5–35.4)
ARTERIAL PATENCY WRIST A: POSITIVE
AST SERPL-CCNC: 12 U/L (ref 1–32)
ATMOSPHERIC PRESS: 730 MMHG
BACTERIA UR QL AUTO: ABNORMAL /HPF
BASE EXCESS BLDA CALC-SCNC: -1.9 MMOL/L (ref 0–2)
BASOPHILS # BLD AUTO: 0.04 10*3/MM3 (ref 0–0.2)
BASOPHILS NFR BLD AUTO: 0.5 % (ref 0–1.5)
BDY SITE: ABNORMAL
BILIRUB SERPL-MCNC: 0.2 MG/DL (ref 0–1.2)
BILIRUB UR QL STRIP: NEGATIVE
BODY TEMPERATURE: 0 C
BUN SERPL-MCNC: 24 MG/DL (ref 6–20)
BUN/CREAT SERPL: 27 (ref 7–25)
CALCIUM SPEC-SCNC: 9.8 MG/DL (ref 8.6–10.5)
CHLORIDE SERPL-SCNC: 97 MMOL/L (ref 98–107)
CLARITY UR: ABNORMAL
CO2 BLDA-SCNC: 23.3 MMOL/L (ref 22–33)
CO2 SERPL-SCNC: 18.7 MMOL/L (ref 22–29)
COHGB MFR BLD: 1.6 % (ref 0–5)
COLOR UR: YELLOW
CREAT SERPL-MCNC: 0.89 MG/DL (ref 0.57–1)
CRP SERPL-MCNC: 3.37 MG/DL (ref 0–0.5)
D-LACTATE SERPL-SCNC: 1.9 MMOL/L (ref 0.5–2)
DEPRECATED RDW RBC AUTO: 47.8 FL (ref 37–54)
EGFRCR SERPLBLD CKD-EPI 2021: 86.8 ML/MIN/1.73
EOSINOPHIL # BLD AUTO: 0.19 10*3/MM3 (ref 0–0.4)
EOSINOPHIL NFR BLD AUTO: 2.3 % (ref 0.3–6.2)
ERYTHROCYTE [DISTWIDTH] IN BLOOD BY AUTOMATED COUNT: 14.2 % (ref 12.3–15.4)
FLUAV SUBTYP SPEC NAA+PROBE: NOT DETECTED
FLUBV RNA ISLT QL NAA+PROBE: NOT DETECTED
GLOBULIN UR ELPH-MCNC: 4.7 GM/DL
GLUCOSE BLDC GLUCOMTR-MCNC: 197 MG/DL (ref 70–130)
GLUCOSE SERPL-MCNC: 203 MG/DL (ref 65–99)
GLUCOSE UR STRIP-MCNC: NEGATIVE MG/DL
HCO3 BLDA-SCNC: 22.2 MMOL/L (ref 20–26)
HCT VFR BLD AUTO: 28.8 % (ref 34–46.6)
HCT VFR BLD CALC: 30.5 % (ref 38–51)
HGB BLD-MCNC: 9.9 G/DL (ref 12–15.9)
HGB BLDA-MCNC: 10 G/DL (ref 13.5–17.5)
HGB UR QL STRIP.AUTO: ABNORMAL
HOLD SPECIMEN: NORMAL
HOLD SPECIMEN: NORMAL
HYALINE CASTS UR QL AUTO: ABNORMAL /LPF
IMM GRANULOCYTES # BLD AUTO: 0.03 10*3/MM3 (ref 0–0.05)
IMM GRANULOCYTES NFR BLD AUTO: 0.4 % (ref 0–0.5)
INHALED O2 CONCENTRATION: 21 %
INR PPP: 1 (ref 0.9–1.1)
KETONES UR QL STRIP: NEGATIVE
LEUKOCYTE ESTERASE UR QL STRIP.AUTO: ABNORMAL
LIPASE SERPL-CCNC: 86 U/L (ref 13–60)
LYMPHOCYTES # BLD AUTO: 1.81 10*3/MM3 (ref 0.7–3.1)
LYMPHOCYTES NFR BLD AUTO: 21.9 % (ref 19.6–45.3)
Lab: ABNORMAL
MAGNESIUM SERPL-MCNC: 1.5 MG/DL (ref 1.6–2.6)
MCH RBC QN AUTO: 31.5 PG (ref 26.6–33)
MCHC RBC AUTO-ENTMCNC: 34.4 G/DL (ref 31.5–35.7)
MCV RBC AUTO: 91.7 FL (ref 79–97)
METHGB BLD QL: 0.3 % (ref 0–3)
MODALITY: ABNORMAL
MONOCYTES # BLD AUTO: 0.53 10*3/MM3 (ref 0.1–0.9)
MONOCYTES NFR BLD AUTO: 6.4 % (ref 5–12)
NEUTROPHILS NFR BLD AUTO: 5.68 10*3/MM3 (ref 1.7–7)
NEUTROPHILS NFR BLD AUTO: 68.5 % (ref 42.7–76)
NITRITE UR QL STRIP: NEGATIVE
NOTE: ABNORMAL
NRBC BLD AUTO-RTO: 0 /100 WBC (ref 0–0.2)
NT-PROBNP SERPL-MCNC: 318.4 PG/ML (ref 0–450)
OXYHGB MFR BLDV: 96.9 % (ref 94–99)
PCO2 BLDA: 34.7 MM HG (ref 35–45)
PCO2 TEMP ADJ BLD: ABNORMAL MM[HG]
PH BLDA: 7.42 PH UNITS (ref 7.35–7.45)
PH UR STRIP.AUTO: <=5 [PH] (ref 5–8)
PH, TEMP CORRECTED: ABNORMAL
PLATELET # BLD AUTO: 332 10*3/MM3 (ref 140–450)
PMV BLD AUTO: 8.2 FL (ref 6–12)
PO2 BLDA: 95.5 MM HG (ref 83–108)
PO2 TEMP ADJ BLD: ABNORMAL MM[HG]
POTASSIUM SERPL-SCNC: 4.4 MMOL/L (ref 3.5–5.2)
PROCALCITONIN SERPL-MCNC: 0.21 NG/ML (ref 0–0.25)
PROT SERPL-MCNC: 8.4 G/DL (ref 6–8.5)
PROT UR QL STRIP: ABNORMAL
PROTHROMBIN TIME: 13.6 SECONDS (ref 12.8–14.5)
RBC # BLD AUTO: 3.14 10*6/MM3 (ref 3.77–5.28)
RBC # UR STRIP: ABNORMAL /HPF
REF LAB TEST METHOD: ABNORMAL
SAO2 % BLDCOA: 98.8 % (ref 94–99)
SARS-COV-2 RNA PNL SPEC NAA+PROBE: NOT DETECTED
SODIUM SERPL-SCNC: 132 MMOL/L (ref 136–145)
SP GR UR STRIP: 1.01 (ref 1–1.03)
SQUAMOUS #/AREA URNS HPF: ABNORMAL /HPF
TROPONIN T SERPL-MCNC: <0.01 NG/ML (ref 0–0.03)
TROPONIN T SERPL-MCNC: <0.01 NG/ML (ref 0–0.03)
UROBILINOGEN UR QL STRIP: ABNORMAL
VENTILATOR MODE: ABNORMAL
WBC # UR STRIP: ABNORMAL /HPF
WBC NRBC COR # BLD: 8.28 10*3/MM3 (ref 3.4–10.8)
WHOLE BLOOD HOLD SPECIMEN: NORMAL
WHOLE BLOOD HOLD SPECIMEN: NORMAL

## 2022-03-01 PROCEDURE — 87086 URINE CULTURE/COLONY COUNT: CPT | Performed by: PHYSICIAN ASSISTANT

## 2022-03-01 PROCEDURE — 96376 TX/PRO/DX INJ SAME DRUG ADON: CPT

## 2022-03-01 PROCEDURE — 83690 ASSAY OF LIPASE: CPT | Performed by: PHYSICIAN ASSISTANT

## 2022-03-01 PROCEDURE — 82375 ASSAY CARBOXYHB QUANT: CPT

## 2022-03-01 PROCEDURE — 82962 GLUCOSE BLOOD TEST: CPT

## 2022-03-01 PROCEDURE — 85730 THROMBOPLASTIN TIME PARTIAL: CPT | Performed by: PHYSICIAN ASSISTANT

## 2022-03-01 PROCEDURE — 93005 ELECTROCARDIOGRAM TRACING: CPT | Performed by: PHYSICIAN ASSISTANT

## 2022-03-01 PROCEDURE — 82150 ASSAY OF AMYLASE: CPT | Performed by: PHYSICIAN ASSISTANT

## 2022-03-01 PROCEDURE — 87636 SARSCOV2 & INF A&B AMP PRB: CPT | Performed by: PHYSICIAN ASSISTANT

## 2022-03-01 PROCEDURE — 87186 SC STD MICRODIL/AGAR DIL: CPT | Performed by: PHYSICIAN ASSISTANT

## 2022-03-01 PROCEDURE — 83605 ASSAY OF LACTIC ACID: CPT | Performed by: PHYSICIAN ASSISTANT

## 2022-03-01 PROCEDURE — 99284 EMERGENCY DEPT VISIT MOD MDM: CPT

## 2022-03-01 PROCEDURE — 71275 CT ANGIOGRAPHY CHEST: CPT

## 2022-03-01 PROCEDURE — 85025 COMPLETE CBC W/AUTO DIFF WBC: CPT | Performed by: PHYSICIAN ASSISTANT

## 2022-03-01 PROCEDURE — 74177 CT ABD & PELVIS W/CONTRAST: CPT

## 2022-03-01 PROCEDURE — 93010 ELECTROCARDIOGRAM REPORT: CPT | Performed by: INTERNAL MEDICINE

## 2022-03-01 PROCEDURE — 81001 URINALYSIS AUTO W/SCOPE: CPT | Performed by: PHYSICIAN ASSISTANT

## 2022-03-01 PROCEDURE — 83735 ASSAY OF MAGNESIUM: CPT | Performed by: PHYSICIAN ASSISTANT

## 2022-03-01 PROCEDURE — 36600 WITHDRAWAL OF ARTERIAL BLOOD: CPT

## 2022-03-01 PROCEDURE — 96375 TX/PRO/DX INJ NEW DRUG ADDON: CPT

## 2022-03-01 PROCEDURE — 25010000002 HYDROMORPHONE 1 MG/ML SOLUTION: Performed by: STUDENT IN AN ORGANIZED HEALTH CARE EDUCATION/TRAINING PROGRAM

## 2022-03-01 PROCEDURE — 71045 X-RAY EXAM CHEST 1 VIEW: CPT

## 2022-03-01 PROCEDURE — 96374 THER/PROPH/DIAG INJ IV PUSH: CPT

## 2022-03-01 PROCEDURE — 74177 CT ABD & PELVIS W/CONTRAST: CPT | Performed by: RADIOLOGY

## 2022-03-01 PROCEDURE — 71045 X-RAY EXAM CHEST 1 VIEW: CPT | Performed by: RADIOLOGY

## 2022-03-01 PROCEDURE — 85610 PROTHROMBIN TIME: CPT | Performed by: PHYSICIAN ASSISTANT

## 2022-03-01 PROCEDURE — 25010000002 MAGNESIUM SULFATE 2 GM/50ML SOLUTION: Performed by: PHYSICIAN ASSISTANT

## 2022-03-01 PROCEDURE — 87077 CULTURE AEROBIC IDENTIFY: CPT | Performed by: PHYSICIAN ASSISTANT

## 2022-03-01 PROCEDURE — 87040 BLOOD CULTURE FOR BACTERIA: CPT | Performed by: PHYSICIAN ASSISTANT

## 2022-03-01 PROCEDURE — 25010000002 ONDANSETRON PER 1 MG: Performed by: PHYSICIAN ASSISTANT

## 2022-03-01 PROCEDURE — 0 IOPAMIDOL PER 1 ML: Performed by: STUDENT IN AN ORGANIZED HEALTH CARE EDUCATION/TRAINING PROGRAM

## 2022-03-01 PROCEDURE — 82805 BLOOD GASES W/O2 SATURATION: CPT

## 2022-03-01 PROCEDURE — 83050 HGB METHEMOGLOBIN QUAN: CPT

## 2022-03-01 PROCEDURE — 84145 PROCALCITONIN (PCT): CPT | Performed by: PHYSICIAN ASSISTANT

## 2022-03-01 PROCEDURE — 71275 CT ANGIOGRAPHY CHEST: CPT | Performed by: RADIOLOGY

## 2022-03-01 PROCEDURE — 86140 C-REACTIVE PROTEIN: CPT | Performed by: PHYSICIAN ASSISTANT

## 2022-03-01 PROCEDURE — 80053 COMPREHEN METABOLIC PANEL: CPT | Performed by: PHYSICIAN ASSISTANT

## 2022-03-01 PROCEDURE — 83880 ASSAY OF NATRIURETIC PEPTIDE: CPT | Performed by: PHYSICIAN ASSISTANT

## 2022-03-01 PROCEDURE — 84484 ASSAY OF TROPONIN QUANT: CPT | Performed by: PHYSICIAN ASSISTANT

## 2022-03-01 RX ORDER — SODIUM CHLORIDE 0.9 % (FLUSH) 0.9 %
10 SYRINGE (ML) INJECTION AS NEEDED
Status: DISCONTINUED | OUTPATIENT
Start: 2022-03-01 | End: 2022-03-01 | Stop reason: HOSPADM

## 2022-03-01 RX ORDER — MAGNESIUM SULFATE HEPTAHYDRATE 40 MG/ML
2 INJECTION, SOLUTION INTRAVENOUS ONCE
Status: COMPLETED | OUTPATIENT
Start: 2022-03-01 | End: 2022-03-01

## 2022-03-01 RX ORDER — ONDANSETRON 2 MG/ML
4 INJECTION INTRAMUSCULAR; INTRAVENOUS ONCE
Status: COMPLETED | OUTPATIENT
Start: 2022-03-01 | End: 2022-03-01

## 2022-03-01 RX ADMIN — ONDANSETRON 4 MG: 2 INJECTION INTRAMUSCULAR; INTRAVENOUS at 09:41

## 2022-03-01 RX ADMIN — MAGNESIUM SULFATE HEPTAHYDRATE 2 G: 40 INJECTION, SOLUTION INTRAVENOUS at 12:19

## 2022-03-01 RX ADMIN — SODIUM CHLORIDE 1000 ML: 9 INJECTION, SOLUTION INTRAVENOUS at 09:42

## 2022-03-01 RX ADMIN — IOPAMIDOL 80 ML: 755 INJECTION, SOLUTION INTRAVENOUS at 12:00

## 2022-03-01 RX ADMIN — HYDROMORPHONE HYDROCHLORIDE 1 MG: 1 INJECTION, SOLUTION INTRAMUSCULAR; INTRAVENOUS; SUBCUTANEOUS at 12:17

## 2022-03-01 RX ADMIN — HYDROMORPHONE HYDROCHLORIDE 1 MG: 1 INJECTION, SOLUTION INTRAMUSCULAR; INTRAVENOUS; SUBCUTANEOUS at 09:41

## 2022-03-01 NOTE — ED PROVIDER NOTES
"Subjective   36-year-old female who presents to the ED today for left flank pain.  She states she woke up around midnight and was \"gasping for air,\" felt dizzy and nauseous and was having left flank pain that radiated into her left abdomen.  She states the pain was intermittent but has been getting worse and states it has been constant for the last 30 minutes.  She denies any vomiting or diarrhea.  She states her last bowel movement was last night and was normal for her.  She states it does hurt to urinate.  She denies any fever.  She denies any cough or congestion.  She reports that she was recently admitted to the [facility] for 52 days.  She was discharged on February 15.  She was admitted around December 26 because she had a fall and was found to have a left retroperitoneal hematoma as well as pyelonephritis.  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  She did require a blood transfusion due to the hematoma.  She was also found to have MRSA bacteremia and developed multiple intra-abdominal abscesses.  She did have an IVC filter and a ureteral stent placed.  She also had CT-guided drains placed.  She continues to have a drain to the left lower quadrant and it is still draining.  She states while at  she did require intubation and dialysis.  She states she also had a stroke.  She is still currently on oral antibiotics.  She states she is on cefdinir and Bactrim and believes her last dose is today.      History provided by:  Patient  Flank Pain  Pain location:  L flank  Pain quality: sharp    Pain radiates to:  LLQ  Pain severity:  Severe  Onset quality:  Sudden  Duration:  9 hours  Timing:  Intermittent  Progression:  Worsening  Chronicity:  Recurrent  Context: recent illness    Relieved by:  Nothing  Worsened by:  Nothing  Associated symptoms: dysuria, nausea and shortness of breath    Associated symptoms: no anorexia, no chest pain, no chills, no " constipation, no cough, no diarrhea, no fatigue, no fever, no hematemesis, no hematochezia, no hematuria, no melena and no vomiting    Risk factors: obesity and recent hospitalization        Review of Systems   Constitutional: Negative.  Negative for chills, fatigue and fever.   HENT: Negative.    Eyes: Negative.    Respiratory: Positive for shortness of breath. Negative for cough.    Cardiovascular: Negative for chest pain.   Gastrointestinal: Positive for nausea. Negative for anorexia, constipation, diarrhea, hematemesis, hematochezia, melena and vomiting.   Genitourinary: Positive for dysuria and flank pain. Negative for difficulty urinating, frequency, hematuria and urgency.   Musculoskeletal: Positive for back pain.   Skin: Negative.    Neurological: Negative.    Psychiatric/Behavioral: Negative.    All other systems reviewed and are negative.      Past Medical History:   Diagnosis Date   • A-fib (HCC)    • Abnormal ECG    • Anemia    • Anxiety    • Asthma    • Cancer (HCC)     Ovarian   • Depression    • Diabetes mellitus (HCC)    • DVT (deep venous thrombosis) (HCC)    • DVT (deep venous thrombosis) (HCC)    • Factor 5 Leiden mutation, heterozygous (HCC)    • Fibroid    • GERD (gastroesophageal reflux disease)    • Gestational diabetes    • Gout    • Hyperlipidemia    • Hypothyroid    • Kidney stone    • Migraines    • Neuropathy    • Ovarian cancer (HCC) 2021   • Ovarian cyst    • PE (pulmonary embolism)    • Polycystic ovary syndrome    • Preeclampsia    • Rh incompatibility    • Stroke (HCC)    • TIA (transient ischemic attack)    • Urinary tract infection    • Varicella        Allergies   Allergen Reactions   • Amoxicillin Hives   • Haldol [Haloperidol] Hives   • Penicillins Hives   • Toradol [Ketorolac Tromethamine] Hives   • Tramadol Swelling       Past Surgical History:   Procedure Laterality Date   • ABDOMINAL SURGERY     • CARDIAC CATHETERIZATION     •  SECTION     • CHOLECYSTECTOMY     •  "COLONOSCOPY     • ENDOSCOPY     • EXTRACORPOREAL SHOCK WAVE LITHOTRIPSY (ESWL) Left 10/22/2021    Procedure: EXTRACORPOREAL SHOCKWAVE LITHOTRIPSY;  Surgeon: Milan Motley MD;  Location: [facility];  Service: Urology;  Laterality: Left;   • LITHOTRIPSY     • RIGHT OOPHORECTOMY     • URETEROSCOPY LASER LITHOTRIPSY WITH STENT INSERTION Left 10/1/2021    Procedure: URETEROSCOPY WITH STENT PLACEMENT;  Surgeon: Milan Motley MD;  Location: [facility];  Service: Urology;  Laterality: Left;       Family History   Problem Relation Age of Onset   • No Known Problems Mother    • No Known Problems Father    • No Known Problems Sister    • No Known Problems Brother    • No Known Problems Son    • No Known Problems Daughter    • No Known Problems Maternal Grandmother    • No Known Problems Maternal Grandfather    • No Known Problems Paternal Grandmother    • No Known Problems Paternal Grandfather    • No Known Problems Cousin    • Rheum arthritis Neg Hx    • Osteoarthritis Neg Hx    • Asthma Neg Hx    • Diabetes Neg Hx    • Heart failure Neg Hx    • Hyperlipidemia Neg Hx    • Hypertension Neg Hx    • Migraines Neg Hx    • Rashes / Skin problems Neg Hx    • Seizures Neg Hx    • Stroke Neg Hx    • Thyroid disease Neg Hx        Social History     Socioeconomic History   • Marital status:    Tobacco Use   • Smoking status: Never Smoker   • Smokeless tobacco: Never Used   Vaping Use   • Vaping Use: Never used   Substance and Sexual Activity   • Alcohol use: No   • Drug use: Never     Comment: Pt has track marks on right arm. Pt states \"It's from my INR being drawn.\"    • Sexual activity: Not Currently     Partners: Male     Birth control/protection: None           Objective   Physical Exam  Vitals and nursing note reviewed.   Constitutional:       General: She is not in acute distress.     Appearance: She is well-developed. She is obese. She is not diaphoretic.   HENT:      Head: Normocephalic and atraumatic. "   Eyes:      Extraocular Movements: Extraocular movements intact.      Pupils: Pupils are equal, round, and reactive to light.   Cardiovascular:      Rate and Rhythm: Regular rhythm. Tachycardia present.      Heart sounds: Normal heart sounds.   Pulmonary:      Effort: Pulmonary effort is normal.      Breath sounds: Normal breath sounds. No wheezing or rhonchi.   Chest:      Chest wall: No tenderness.   Abdominal:      General: Bowel sounds are normal.      Palpations: Abdomen is soft.      Tenderness: There is no abdominal tenderness. There is left CVA tenderness. There is no right CVA tenderness, guarding or rebound.   Skin:     General: Skin is warm and dry.      Capillary Refill: Capillary refill takes less than 2 seconds.   Neurological:      General: No focal deficit present.      Mental Status: She is alert and oriented to person, place, and time.   Psychiatric:         Mood and Affect: Mood normal.         Procedures           ED Course  ED Course as of 03/01/22 1456   Tue Mar 01, 2022   1030 EKG noted sinus tachycardia.  106 bpm.  .  QRS 94.  QTc 4 7.  No acute ST elevation []   1047 XR Chest 1 View  FINDINGS:    Lungs:  Unremarkable.  No consolidation.    Pleural space:  Unremarkable.  No pneumothorax.    Heart:  Unremarkable.  No cardiomegaly.    Mediastinum:  Unremarkable.    Bones/joints:  Unremarkable.     IMPRESSION:    Unremarkable exam. No acute cardiopulmonary findings identified. [AH]   1206 Patient reports she is continuing to have pain, additional medication ordered. [AH]   1316 CT Chest Pulmonary Embolism    FINDINGS:    Pulmonary arteries:  No pulmonary embolus identified.  Assessment of  the distal segmental and subsegmental pulmonary arteries limited.    Aorta:  No aortic aneurysm identified.    Lungs:  Lungs are clear.  No mass.    Pleural space:  No pleural effusions are noted.  No pneumothorax.    Heart:  Mild to moderate cardiomegaly is noted.  No pericardial  effusion  identified.  No evidence of RV dysfunction.    Mediastinum:  No hilar or mediastinal adenopathy.    Bones/joints:  Degenerative changes thoracic spine. No acute bony  findings.  No dislocation.    Soft tissues:  Unremarkable.    Lymph nodes:  Scattered unenlarged axillary lymph nodes. No bulky  adenopathy identified.     IMPRESSION:  1.  No acute thoracic findings.  2.  No evidence of PE.  3.  Cardiomegaly. [AH]   1317 CT Abdomen Pelvis With Contrast  IMPRESSION:  1.  Asymmetric enlargement of the left kidney with small exophytic cysts  or nodules but no hydronephrosis identified. This may reflect sequela of  recent and/or concurrent pyelonephritis.  2.  Loculated collection in the left retroperitoneum along the iliopsoas  region with percutaneous drainage catheter in place. 2.3 cm short axis  thickness.  3.  No additional abdominal or pelvic abscesses identified.  4.  Left ureteral stent decompresses the left renal collecting system.  5.  Nonobstructing right kidney stone.  6.  Fatty infiltration of liver with hepatomegaly.  7.  Other nonacute findings as detailed above. [AH]   1344 Patient is feeling better and her symptoms have improved.  No findings of PE on exam.  She has some sequelae of her history of pyelonephritis but no new obstruction.  She does continue to have some aspect of abscess but her drain is in place and it has decreased in size since a previous CT scan on February 7 where it measured 11.8 x 2.2 cm.  She is currently having output in her drain.  She will be able to be discharged home to follow-up outpatient.  She was advised to finish out her antibiotics as prescribed.  There are urine and blood cultures pending.  I advised her she would be contacted if anything comes back abnormal with those.  She will return to the ED if her symptoms change or worsen. []      ED Course User Index  [AH] Elisha Tomas PA  [SF] David Mccoy,                                                  MDM  Number of  Diagnoses or Management Options     Amount and/or Complexity of Data Reviewed  Clinical lab tests: reviewed  Tests in the radiology section of CPT®: reviewed  Tests in the medicine section of CPT®: reviewed  Decide to obtain previous medical records or to obtain history from someone other than the patient: yes    Patient Progress  Patient progress: improved      Final diagnoses:   Left flank pain       ED Disposition  ED Disposition     ED Disposition Condition Comment    Discharge Stable Pt discharged home with stable VS. IVs removed. Pain under control.          Eddie Latif, APRN  [address] [id number]  [phone]    Schedule an appointment as soon as possible for a visit in 2 days           Medication List      No changes were made to your prescriptions during this visit.          Elisha Tomas PA  03/01/22 [id number]

## 2022-03-02 ENCOUNTER — HOSPITAL ENCOUNTER (EMERGENCY)
Facility: HOSPITAL | Age: 37
Discharge: LEFT BEFORE DISCHARGE | End: 2022-03-02

## 2022-03-02 VITALS
SYSTOLIC BLOOD PRESSURE: 152 MMHG | TEMPERATURE: 98.4 F | WEIGHT: 220 LBS | DIASTOLIC BLOOD PRESSURE: 101 MMHG | OXYGEN SATURATION: 98 % | HEART RATE: 100 BPM | BODY MASS INDEX: 37.56 KG/M2 | HEIGHT: 64 IN | RESPIRATION RATE: 20 BRPM

## 2022-03-02 PROCEDURE — 93005 ELECTROCARDIOGRAM TRACING: CPT | Performed by: EMERGENCY MEDICINE

## 2022-03-02 PROCEDURE — 93010 ELECTROCARDIOGRAM REPORT: CPT | Performed by: INTERNAL MEDICINE

## 2022-03-02 PROCEDURE — 99211 OFF/OP EST MAY X REQ PHY/QHP: CPT

## 2022-03-03 LAB
QT INTERVAL: 352 MS
QT INTERVAL: 354 MS
QTC INTERVAL: 469 MS
QTC INTERVAL: 470 MS

## 2022-03-03 NOTE — ED NOTES
"Patient states, \"My Son needs me at home. He called a cab for me.\" Instructed patient regarding Benefits of staying for treatment vs. Risks of Leaving without being seen after Triage. Patient insists on leaving.\" No iv access initiated during this hospital visit. Patient signed LWBS documentation.      Mena Pabon, RN  03/02/22 5303    "

## 2022-03-04 ENCOUNTER — HOSPITAL ENCOUNTER (EMERGENCY)
Facility: HOSPITAL | Age: 37
Discharge: LEFT BEFORE DISCHARGE | End: 2022-03-04

## 2022-03-04 ENCOUNTER — CONSULT (OUTPATIENT)
Dept: ONCOLOGY | Facility: CLINIC | Age: 37
End: 2022-03-04

## 2022-03-04 VITALS
WEIGHT: 220 LBS | OXYGEN SATURATION: 98 % | DIASTOLIC BLOOD PRESSURE: 58 MMHG | TEMPERATURE: 97.5 F | SYSTOLIC BLOOD PRESSURE: 87 MMHG | BODY MASS INDEX: 37.56 KG/M2 | HEIGHT: 64 IN | HEART RATE: 89 BPM | RESPIRATION RATE: 18 BRPM

## 2022-03-04 VITALS
HEART RATE: 108 BPM | RESPIRATION RATE: 18 BRPM | DIASTOLIC BLOOD PRESSURE: 100 MMHG | HEIGHT: 64 IN | TEMPERATURE: 98 F | OXYGEN SATURATION: 97 % | BODY MASS INDEX: 37.56 KG/M2 | SYSTOLIC BLOOD PRESSURE: 153 MMHG | WEIGHT: 220 LBS

## 2022-03-04 DIAGNOSIS — D68.51 FACTOR V LEIDEN: Primary | ICD-10-CM

## 2022-03-04 DIAGNOSIS — I95.89 OTHER SPECIFIED HYPOTENSION: ICD-10-CM

## 2022-03-04 LAB — BACTERIA SPEC AEROBE CULT: NORMAL

## 2022-03-04 PROCEDURE — 99205 OFFICE O/P NEW HI 60 MIN: CPT | Performed by: INTERNAL MEDICINE

## 2022-03-04 PROCEDURE — 99211 OFF/OP EST MAY X REQ PHY/QHP: CPT

## 2022-03-04 NOTE — PROGRESS NOTES
Natalia Arzate  [id number]  1985  3/4/2022      Referring Provider:   YEIMI Frausto    Reason for Consultation:   Factor V Leiden mutation    Chief Complaint:  Fatigue and weak  Right lower lateral back pain      History of Present Illness   Natalia Arzate is a very pleasant 36 y.o.  female who presents in new consultation at the request of YEIMI Frausto for further management and evaluation of Factor V Leiden mutation.     She was previously evaluated by Dr. De Dios in 2013 when she was diagnosed with right lower extremity DVT and bilateral pulmonary embolism. As per his clinic note that was dated on 4/25/14 her workup was positive for heterozygous Factor V Leiden and MTHFR gene mutation. Her thrombosis was felt to be provoked as she was on hormone replacement therapy at the time and Coumadin was recommended for one year. This was later changed to Xarelto by her PCP. She reports being compliant with Xarelto and developing a lower extremity DVT in 2015 at which time she was changed back to Coumadin.   However she has been without Coumadin for one year as she did not follow with her primary provider as scheduled.   She presented to [facility] ED after a fall at home with complaints of fevers. She was found to have left pyelonephritis complicated by multiple abscesses as well as superinfection of left iliopsoas hematoma and was transferred to the [facility] on December 26th 2021 where she had a prolonged hospitalizaton. She developed acute respiratory failure and was intubated and also required CRRT/HD. During that admission abscesses were positive for MRSA and E. Coli and she had a left ureteral stent and drain placed which has been removed. She had a repeat CT scan that showed a decrease in size of the left RP collection on 2/7/22. She had an IVC filter placed during her admission and was initially started on heparin drip due to her hematoma and later bivalirudin due to  possible heparin resistance which was held intermittently for procedures or bleeding concerns. This was transitioned to Lovenox and ultimately to Eliquis which she is tolerating well. She also developed an acute/subacute right cerebellar ischemic stroke which was noted on 22 CT scan compared to CT scan one month prior. Neurology was consulted and recommended continuing anticoagulation. Echo was without intracardiac thrombosis.  Currently she is complaining of left lateral lower back pain, pain on urination, as well as weakness and has lower than normal BP in clinic today.          The following portions of the patient's history were reviewed and updated as appropriate: allergies, current medications, past family history, past medical history, past social history, past surgical history and problem list.    Allergies   Allergen Reactions   • Amoxicillin Hives   • Haldol [Haloperidol] Hives   • Penicillins Hives   • Toradol [Ketorolac Tromethamine] Hives   • Tramadol Swelling       Past Medical History:   Diagnosis Date   • A-fib (HCC)    • Abnormal ECG    • Anemia    • Anxiety    • Asthma    • Cancer (HCC)     Ovarian   • Depression    • Diabetes mellitus (HCC)    • DVT (deep venous thrombosis) (HCC)    • DVT (deep venous thrombosis) (HCC)    • Factor 5 Leiden mutation, heterozygous (HCC)    • Fibroid    • GERD (gastroesophageal reflux disease)    • Gestational diabetes    • Gout    • Hyperlipidemia    • Hypothyroid    • Kidney stone    • Migraines    • Neuropathy    • Ovarian cancer (HCC) 2021   • Ovarian cyst    • PE (pulmonary embolism)    • Polycystic ovary syndrome    • Preeclampsia    • Rh incompatibility    • Stroke (HCC)    • TIA (transient ischemic attack)    • Urinary tract infection    • Varicella     last year half hysteecrtomy at [facility]      Past Surgical History:   Procedure Laterality Date   • ABDOMINAL SURGERY     • CARDIAC CATHETERIZATION     •  SECTION     • CHOLECYSTECTOMY    "  • COLONOSCOPY     • ENDOSCOPY     • EXTRACORPOREAL SHOCK WAVE LITHOTRIPSY (ESWL) Left 10/22/2021    Procedure: EXTRACORPOREAL SHOCKWAVE LITHOTRIPSY;  Surgeon: Milan Motley MD;  Location: [facility];  Service: Urology;  Laterality: Left;   • LITHOTRIPSY     • RIGHT OOPHORECTOMY     • URETEROSCOPY LASER LITHOTRIPSY WITH STENT INSERTION Left 10/1/2021    Procedure: URETEROSCOPY WITH STENT PLACEMENT;  Surgeon: Milan Motley MD;  Location: [facility];  Service: Urology;  Laterality: Left;           Social History     Socioeconomic History   • Marital status:    Tobacco Use   • Smoking status: Never Smoker   • Smokeless tobacco: Never Used   Vaping Use   • Vaping Use: Never used   Substance and Sexual Activity   • Alcohol use: No   • Drug use: Never     Comment: Pt has track marks on right arm. Pt states \"It's from my INR being drawn.\"    • Sexual activity: Not Currently     Partners: Male     Birth control/protection: None           Family History   Problem Relation Age of Onset   • Hypertension Mother    • Diabetes Father    • Hypertension Father    • Heart attack Father    • No Known Problems Sister    • No Known Problems Brother    • No Known Problems Son    • No Known Problems Daughter    • No Known Problems Maternal Grandmother    • No Known Problems Maternal Grandfather    • No Known Problems Paternal Grandmother    • No Known Problems Paternal Grandfather    • No Known Problems Cousin    • Rheum arthritis Neg Hx    • Osteoarthritis Neg Hx    • Asthma Neg Hx    • Heart failure Neg Hx    • Hyperlipidemia Neg Hx    • Migraines Neg Hx    • Rashes / Skin problems Neg Hx    • Seizures Neg Hx    • Stroke Neg Hx    • Thyroid disease Neg Hx      Father - PE          Current Outpatient Medications:   •  amLODIPine (NORVASC) 10 MG tablet, Take 10 mg by mouth Daily., Disp: , Rfl:   •  apixaban (ELIQUIS) 5 MG tablet tablet, Take 5 mg by mouth., Disp: , Rfl:   •  B-D UF III MINI PEN NEEDLES 31G X 5 " MM misc, , Disp: , Rfl:   •  Blood Glucose Monitoring Suppl (FreeStyle Lite) w/Device kit, , Disp: , Rfl:   •  Continuous Blood Gluc  (Dexcom G6 ) device, 1 Device Daily., Disp: 1 each, Rfl: 0  •  Continuous Blood Gluc Sensor (Dexcom G6 Sensor), Every 10 (Ten) Days., Disp: 9 each, Rfl: 3  •  Continuous Blood Gluc Transmit (Dexcom G6 Transmitter) misc, 1 Device Daily for 90 days., Disp: 1 each, Rfl: 3  •  Cyanocobalamin 1000 MCG/ML kit, Inject 1 mL as directed 1 (One) Time Per Week., Disp: 4 kit, Rfl: 3  •  DULoxetine (CYMBALTA) 20 MG capsule, Take 20 mg by mouth Daily., Disp: , Rfl:   •  ferrous sulfate 325 (65 FE) MG EC tablet, Take 1 tablet by mouth 2 (Two) Times a Day. With food and/or orange juice, Disp: 60 tablet, Rfl: 5  •  gabapentin (NEURONTIN) 300 MG capsule, Take 300 mg by mouth., Disp: , Rfl:   •  glucose blood test strip, Use with glucometer and lancets to test blood sugar 4 times daily, Disp: , Rfl:   •  insulin glargine (LANTUS, SEMGLEE) 100 UNIT/ML injection, Inject 25 Units under the skin into the appropriate area as directed., Disp: , Rfl:   •  naloxone (NARCAN) 4 MG/0.1ML nasal spray, 4 mg into the nostril(s) as directed by provider., Disp: , Rfl:   •  Omega-3 Fatty Acids (fish oil) 1000 MG capsule capsule, Take  by mouth Daily With Breakfast., Disp: , Rfl:   •  vitamin D (ERGOCALCIFEROL) 1.25 MG (84673 UT) capsule capsule, Take 1 capsule by mouth 1 (One) Time Per Week., Disp: 5 capsule, Rfl: 3  •  ondansetron ODT (Zofran ODT) 4 MG disintegrating tablet, Place 1 tablet on the tongue Every 8 (Eight) Hours As Needed for Nausea or Vomiting., Disp: 30 tablet, Rfl: 2        Review of Systems  Constitutional: Subjective fevers, no chills, night sweats or weight loss.   HEENT: Chronic headaches, vision changes or hearing changes, no sinus drainage, sore throat.   Cardiovascular:  No palpitations, chest pain, syncopal episodes or edema.   Pulmonary:  Positive shortness of breath,  hemoptysis, or cough.   Gastrointestinal:  No nausea or vomiting. No constipation or diarrhea. No abdominal pain. No melena or hematochezia.   Genitourinary:  No hematuria, or changes in urination. Positive back pain. Dark urine.   Musculoskeletal:  No pain, or joint problems.   Skin: No rashes or pruritus.   Endocrine:  No hot flashes or chills   Hematologic:  No history of free bleeding, spontaneous bleeding or clotting problems. No lymphadenopathy.    Immunologic:  No allergies or frequent infections.   Neurologic: No numbness, tingling, or weakness.   Psychiatric:  No anxiety or depression.           Physical Exam  Vital Signs: These were reviewed and listed as per patient’s electronic medical chart  Vitals:    03/04/22 1309   BP: (!) 87/58   Pulse: 89   Resp: 18   Temp: 97.5 °F (36.4 °C)   SpO2: 98%     General: Awake, alert and oriented, in no distress  HEENT: Head is atraumatic, normocephalic, extraocular movements full, no scleral icterus  Neck: supple, no jvd, lymphadenopathy or masses  Cardiovascular: regular rate, tachycardic, without murmurs, rubs or gallops  Pulmonary: non-labored, clear to auscultation bilaterally, no wheezing  Abdomen: soft, non-tender, non-distended, normal active bowel sounds present, drain in place  Extremities: No clubbing, cyanosis or edema  Lymph: No cervical, supraclavicular adenopathy  Neurologic: Mental status as above, alert, awake and oriented, grossly non-focal exam  Skin: warm, dry, intact  Back: lateral CVA left tenderness          Pain Score:  Pain Score    03/04/22 1309   PainSc:   9   PainLoc: Back  Comment: left kidney       PHQ-Score Total:  PHQ-9 Total Score: 0        Labs / Studies:                     Admission on 03/01/2022, Discharged on 03/01/2022   Component Date Value   • Extra Tube 03/01/2022 Hold for add-ons.    • Extra Tube 03/01/2022 hold for add-on    • Extra Tube 03/01/2022 Hold for add-ons.    • Extra Tube 03/01/2022 hold for add-on    • Glucose  03/01/2022 203*   • BUN 03/01/2022 24*   • Creatinine 03/01/2022 0.89    • Sodium 03/01/2022 132*   • Potassium 03/01/2022 4.4    • Chloride 03/01/2022 97*   • CO2 03/01/2022 18.7*   • Calcium 03/01/2022 9.8    • Total Protein 03/01/2022 8.4    • Albumin 03/01/2022 3.74    • ALT (SGPT) 03/01/2022 15    • AST (SGOT) 03/01/2022 12    • Alkaline Phosphatase 03/01/2022 148*   • Total Bilirubin 03/01/2022 0.2    • Globulin 03/01/2022 4.7    • A/G Ratio 03/01/2022 0.8    • BUN/Creatinine Ratio 03/01/2022 27.0*   • Anion Gap 03/01/2022 16.3*   • eGFR 03/01/2022 86.8    • Protime 03/01/2022 13.6    • INR 03/01/2022 1.00    • PTT 03/01/2022 33.6    • Lipase 03/01/2022 86*   • Amylase 03/01/2022 79    • Color, UA 03/01/2022 Yellow    • Appearance, UA 03/01/2022 Cloudy*   • pH, UA 03/01/2022 <=5.0    • Specific Gravity, UA 03/01/2022 1.013    • Glucose, UA 03/01/2022 Negative    • Ketones, UA 03/01/2022 Negative    • Bilirubin, UA 03/01/2022 Negative    • Blood, UA 03/01/2022 Large (3+)*   • Protein, UA 03/01/2022 Trace*   • Leuk Esterase, UA 03/01/2022 Moderate (2+)*   • Nitrite, UA 03/01/2022 Negative    • Urobilinogen, UA 03/01/2022 0.2 E.U./dL    • Troponin T 03/01/2022 <0.010    • Troponin T 03/01/2022 <0.010    • proBNP 03/01/2022 318.4    • Blood Culture 03/01/2022 No growth at 3 days    • Blood Culture 03/01/2022 No growth at 3 days    • Lactate 03/01/2022 1.9    • Procalcitonin 03/01/2022 0.21    • C-Reactive Protein 03/01/2022 3.37*   • Magnesium 03/01/2022 1.5*   • QT Interval 03/01/2022 354    • QTC Interval 03/01/2022 470    • COVID19 03/01/2022 Not Detected    • Influenza A PCR 03/01/2022 Not Detected    • Influenza B PCR 03/01/2022 Not Detected    • WBC 03/01/2022 8.28    • RBC 03/01/2022 3.14*   • Hemoglobin 03/01/2022 9.9*   • Hematocrit 03/01/2022 28.8*   • MCV 03/01/2022 91.7    • MCH 03/01/2022 31.5    • MCHC 03/01/2022 34.4    • RDW 03/01/2022 14.2    • RDW-SD 03/01/2022 47.8    • MPV 03/01/2022 8.2    •  Platelets 03/01/2022 332    • Neutrophil % 03/01/2022 68.5    • Lymphocyte % 03/01/2022 21.9    • Monocyte % 03/01/2022 6.4    • Eosinophil % 03/01/2022 2.3    • Basophil % 03/01/2022 0.5    • Immature Grans % 03/01/2022 0.4    • Neutrophils, Absolute 03/01/2022 5.68    • Lymphocytes, Absolute 03/01/2022 1.81    • Monocytes, Absolute 03/01/2022 0.53    • Eosinophils, Absolute 03/01/2022 0.19    • Basophils, Absolute 03/01/2022 0.04    • Immature Grans, Absolute 03/01/2022 0.03    • nRBC 03/01/2022 0.0    • Site 03/01/2022 Left Radial    • Apolinar's Test 03/01/2022 Positive    • pH, Arterial 03/01/2022 7.416    • pCO2, Arterial 03/01/2022 34.7*   • pO2, Arterial 03/01/2022 95.5    • HCO3, Arterial 03/01/2022 22.2    • Base Excess, Arterial 03/01/2022 -1.9*   • O2 Saturation, Arterial 03/01/2022 98.8    • Hemoglobin, Blood Gas 03/01/2022 10.0*   • Hematocrit, Blood Gas 03/01/2022 30.5*   • Oxyhemoglobin 03/01/2022 96.9    • Methemoglobin 03/01/2022 0.30    • Carboxyhemoglobin 03/01/2022 1.6    • A-a Gradiant 03/01/2022 8.8    • CO2 Content 03/01/2022 23.3    • Temperature 03/01/2022 0.0    • Barometric Pressure for * 03/01/2022 730    • Modality 03/01/2022 Room Air    • FIO2 03/01/2022 21    • Ventilator Mode 03/01/2022 NA    • Collected by 03/01/2022 [id number]    • RBC, UA 03/01/2022 Too Numerous to Count*   • WBC, UA 03/01/2022 21-30*   • Bacteria, UA 03/01/2022 1+*   • Squamous Epithelial Cell* 03/01/2022 0-2    • Hyaline Casts, UA 03/01/2022 None Seen    • Methodology 03/01/2022 Manual Light Microscopy    • Glucose 03/01/2022 197*   • Urine Culture 03/01/2022 50,000 CFU/mL Mixed Margo Isolated    Lab Requisition on 02/25/2022   Component Date Value   • Glucose 02/25/2022 181*   • BUN 02/25/2022 19    • Creatinine 02/25/2022 0.80    • Sodium 02/25/2022 130*   • Potassium 02/25/2022 3.9    • Chloride 02/25/2022 94*   • CO2 02/25/2022 20.7*   • Calcium 02/25/2022 10.1    • Total Protein 02/25/2022 8.4    • Albumin  02/25/2022 3.97    • ALT (SGPT) 02/25/2022 18    • AST (SGOT) 02/25/2022 14    • Alkaline Phosphatase 02/25/2022 155*   • Total Bilirubin 02/25/2022 0.3    • eGFR Non  Amer 02/25/2022 82    • Globulin 02/25/2022 4.4    • A/G Ratio 02/25/2022 0.9    • BUN/Creatinine Ratio 02/25/2022 23.8    • Anion Gap 02/25/2022 15.3*   • Uric Acid 02/25/2022 6.4*   • 25 Hydroxy, Vitamin D 02/25/2022 7.7    • Magnesium 02/25/2022 2.1    • Vitamin B-12 02/25/2022 175*   • WBC 02/25/2022 6.66    • RBC 02/25/2022 3.03*   • Hemoglobin 02/25/2022 9.6*   • Hematocrit 02/25/2022 27.6*   • MCV 02/25/2022 91.1    • MCH 02/25/2022 31.7    • MCHC 02/25/2022 34.8    • RDW 02/25/2022 14.4    • RDW-SD 02/25/2022 47.8    • MPV 02/25/2022 8.1    • Platelets 02/25/2022 339    • Neutrophil % 02/25/2022 62.1    • Lymphocyte % 02/25/2022 25.5    • Monocyte % 02/25/2022 9.3    • Eosinophil % 02/25/2022 2.6    • Basophil % 02/25/2022 0.3    • Immature Grans % 02/25/2022 0.2    • Neutrophils, Absolute 02/25/2022 4.14    • Lymphocytes, Absolute 02/25/2022 1.70    • Monocytes, Absolute 02/25/2022 0.62    • Eosinophils, Absolute 02/25/2022 0.17    • Basophils, Absolute 02/25/2022 0.02    • Immature Grans, Absolute 02/25/2022 0.01    • nRBC 02/25/2022 0.0    Admission on 10/22/2021, Discharged on 10/22/2021   Component Date Value   • HCG, Urine, QL 10/22/2021 Negative    • Lot Number 10/22/2021 hcg [id number]    • Internal Positive Control 10/22/2021 Positive*   • Internal Negative Control 10/22/2021 Negative    • Expiration Date 10/22/2021 12/31/2021    • Glucose 10/22/2021 266*   Lab on 10/20/2021   Component Date Value   • COVID19 10/20/2021 Not Detected    Admission on 10/17/2021, Discharged on 10/17/2021   Component Date Value   • QT Interval 10/17/2021 308    • QTC Interval 10/17/2021 440    • Glucose 10/17/2021 474*   • BUN 10/17/2021 13    • Creatinine 10/17/2021 0.80    • Sodium 10/17/2021 131*   • Potassium 10/17/2021 4.4    • Chloride  10/17/2021 92*   • CO2 10/17/2021 20.6*   • Calcium 10/17/2021 9.2    • Total Protein 10/17/2021 8.4    • Albumin 10/17/2021 4.26    • ALT (SGPT) 10/17/2021 27    • AST (SGOT) 10/17/2021 30    • Alkaline Phosphatase 10/17/2021 92    • Total Bilirubin 10/17/2021 0.2    • eGFR Non  Amer 10/17/2021 82    • Globulin 10/17/2021 4.1    • A/G Ratio 10/17/2021 1.0    • BUN/Creatinine Ratio 10/17/2021 16.3    • Anion Gap 10/17/2021 18.4*   • Color, UA 10/17/2021 Yellow    • Appearance, UA 10/17/2021 Clear    • pH, UA 10/17/2021 5.5    • Specific Gravity, UA 10/17/2021 1.021    • Glucose, UA 10/17/2021 >=1000 mg/dL (3+)*   • Ketones, UA 10/17/2021 Negative    • Bilirubin, UA 10/17/2021 Negative    • Blood, UA 10/17/2021 Negative    • Protein, UA 10/17/2021 30 mg/dL (1+)*   • Leuk Esterase, UA 10/17/2021 Small (1+)*   • Nitrite, UA 10/17/2021 Negative    • Urobilinogen, UA 10/17/2021 0.2 E.U./dL    • Blood Culture 10/17/2021 No growth at 5 days    • Blood Culture 10/17/2021 No growth at 5 days    • HCG, Urine QL 10/17/2021 Negative    • Protime 10/17/2021 12.2*   • INR 10/17/2021 0.87*   • WBC 10/17/2021 5.50    • RBC 10/17/2021 4.56    • Hemoglobin 10/17/2021 12.5    • Hematocrit 10/17/2021 37.7    • MCV 10/17/2021 82.7    • MCH 10/17/2021 27.4    • MCHC 10/17/2021 33.2    • RDW 10/17/2021 16.8*   • RDW-SD 10/17/2021 49.9    • MPV 10/17/2021 8.8    • Platelets 10/17/2021 265    • Neutrophil % 10/17/2021 49.2    • Lymphocyte % 10/17/2021 38.4    • Monocyte % 10/17/2021 9.3    • Eosinophil % 10/17/2021 2.2    • Basophil % 10/17/2021 0.7    • Immature Grans % 10/17/2021 0.2    • Neutrophils, Absolute 10/17/2021 2.71    • Lymphocytes, Absolute 10/17/2021 2.11    • Monocytes, Absolute 10/17/2021 0.51    • Eosinophils, Absolute 10/17/2021 0.12    • Basophils, Absolute 10/17/2021 0.04    • Immature Grans, Absolute 10/17/2021 0.01    • nRBC 10/17/2021 0.0    • RBC, UA 10/17/2021 0-2    • WBC, UA 10/17/2021 31-50*   • Bacteria,  UA 10/17/2021 None Seen    • Squamous Epithelial Cell* 10/17/2021 3-6*   • Hyaline Casts, UA 10/17/2021 None Seen    • Methodology 10/17/2021 Automated Microscopy    • Urine Culture 10/17/2021 >100,000 CFU/mL Staphylococcus, coagulase negative*   • Glucose 10/17/2021 415*   Admission on 10/13/2021, Discharged on 10/13/2021   Component Date Value   • Glucose 10/13/2021 418*   • BUN 10/13/2021 15    • Creatinine 10/13/2021 0.79    • Sodium 10/13/2021 132*   • Potassium 10/13/2021 4.1    • Chloride 10/13/2021 96*   • CO2 10/13/2021 21.8*   • Calcium 10/13/2021 9.4    • Total Protein 10/13/2021 8.1    • Albumin 10/13/2021 3.97    • ALT (SGPT) 10/13/2021 24    • AST (SGOT) 10/13/2021 23    • Alkaline Phosphatase 10/13/2021 96    • Total Bilirubin 10/13/2021 0.3    • eGFR Non  Amer 10/13/2021 83    • Globulin 10/13/2021 4.1    • A/G Ratio 10/13/2021 1.0    • BUN/Creatinine Ratio 10/13/2021 19.0    • Anion Gap 10/13/2021 14.2    • Color, UA 10/13/2021 Yellow    • Appearance, UA 10/13/2021 Cloudy*   • pH, UA 10/13/2021 5.5    • Specific Gravity, UA 10/13/2021 1.025    • Glucose, UA 10/13/2021 >=1000 mg/dL (3+)*   • Ketones, UA 10/13/2021 Negative    • Bilirubin, UA 10/13/2021 Negative    • Blood, UA 10/13/2021 Small (1+)*   • Protein, UA 10/13/2021 100 mg/dL (2+)*   • Leuk Esterase, UA 10/13/2021 Small (1+)*   • Nitrite, UA 10/13/2021 Positive*   • Urobilinogen, UA 10/13/2021 0.2 E.U./dL    • C-Reactive Protein 10/13/2021 3.21*   • HCG, Urine QL 10/13/2021 Negative    • Protime 10/13/2021 12.6*   • INR 10/13/2021 0.91    • WBC 10/13/2021 5.44    • RBC 10/13/2021 4.30    • Hemoglobin 10/13/2021 11.6*   • Hematocrit 10/13/2021 34.8    • MCV 10/13/2021 80.9    • MCH 10/13/2021 27.0    • MCHC 10/13/2021 33.3    • RDW 10/13/2021 16.2*   • RDW-SD 10/13/2021 46.8    • MPV 10/13/2021 8.8    • Platelets 10/13/2021 256    • Extra Tube 10/13/2021 Hold for add-ons.    • Extra Tube 10/13/2021 hold for add-on    • Extra Tube  10/13/2021 Hold for add-ons.    • Extra Tube 10/13/2021 hold for add-on    • Scan Slide 10/13/2021     • Neutrophil % 10/13/2021 54.0    • Lymphocyte % 10/13/2021 35.0    • Monocyte % 10/13/2021 8.0    • Eosinophil % 10/13/2021 1.0    • Bands %  10/13/2021 2.0    • Neutrophils Absolute 10/13/2021 3.05    • Lymphocytes Absolute 10/13/2021 1.90    • Monocytes Absolute 10/13/2021 0.44    • Eosinophils Absolute 10/13/2021 0.05    • Anisocytosis 10/13/2021 Slight/1+    • Platelet Estimate 10/13/2021 Adequate    • RBC, UA 10/13/2021 3-5*   • WBC, UA 10/13/2021 Too Numerous to Count*   • Bacteria, UA 10/13/2021 2+*   • Squamous Epithelial Cell* 10/13/2021 0-2    • Hyaline Casts, UA 10/13/2021 0-2    • Granular Casts, UA 10/13/2021 0-2    • Methodology 10/13/2021 Manual Light Microscopy    • Urine Culture 10/13/2021 >100,000 CFU/mL Staphylococcus, coagulase negative*   • Glucose 10/13/2021 405*   Admission on 10/01/2021, Discharged on 10/01/2021   Component Date Value   • COVID19 10/01/2021 Not Detected    • Glucose 10/01/2021 445*   • BUN 10/01/2021 17    • Creatinine 10/01/2021 0.79    • Sodium 10/01/2021 131*   • Potassium 10/01/2021 4.2    • Chloride 10/01/2021 93*   • CO2 10/01/2021 22.3    • Calcium 10/01/2021 9.3    • Total Protein 10/01/2021 8.3    • Albumin 10/01/2021 3.89    • ALT (SGPT) 10/01/2021 15    • AST (SGOT) 10/01/2021 18    • Alkaline Phosphatase 10/01/2021 112    • Total Bilirubin 10/01/2021 0.4    • eGFR Non  Amer 10/01/2021 83    • Globulin 10/01/2021 4.4    • A/G Ratio 10/01/2021 0.9    • BUN/Creatinine Ratio 10/01/2021 21.5    • Anion Gap 10/01/2021 15.7*   • QT Interval 10/01/2021 380    • QTC Interval 10/01/2021 490    • WBC 10/01/2021 6.02    • RBC 10/01/2021 4.50    • Hemoglobin 10/01/2021 11.9*   • Hematocrit 10/01/2021 35.7    • MCV 10/01/2021 79.3    • MCH 10/01/2021 26.4*   • MCHC 10/01/2021 33.3    • RDW 10/01/2021 14.8    • RDW-SD 10/01/2021 41.9    • MPV 10/01/2021 8.5    •  Platelets 10/01/2021 338    • Neutrophil % 10/01/2021 57.8    • Lymphocyte % 10/01/2021 31.4    • Monocyte % 10/01/2021 8.3    • Eosinophil % 10/01/2021 1.5    • Basophil % 10/01/2021 0.5    • Immature Grans % 10/01/2021 0.5    • Neutrophils, Absolute 10/01/2021 3.48    • Lymphocytes, Absolute 10/01/2021 1.89    • Monocytes, Absolute 10/01/2021 0.50    • Eosinophils, Absolute 10/01/2021 0.09    • Basophils, Absolute 10/01/2021 0.03    • Immature Grans, Absolute 10/01/2021 0.03    • nRBC 10/01/2021 0.0    • Color, UA 10/01/2021 Yellow    • Appearance, UA 10/01/2021 Turbid*   • pH, UA 10/01/2021 6.0    • Specific Gravity, UA 10/01/2021 <=1.005    • Glucose, UA 10/01/2021 100 mg/dL (Trace)*   • Ketones, UA 10/01/2021 Negative    • Bilirubin, UA 10/01/2021 Negative    • Blood, UA 10/01/2021 Large (3+)*   • Protein, UA 10/01/2021 Trace*   • Leuk Esterase, UA 10/01/2021 Large (3+)*   • Nitrite, UA 10/01/2021 Negative    • Urobilinogen, UA 10/01/2021 0.2 E.U./dL    • HCG, Urine QL 10/01/2021 Negative    • HCG, Urine, QL 10/01/2021 Negative    • Lot Number 10/01/2021 [id number]    • Internal Positive Control 10/01/2021 Positive    • Internal Negative Control 10/01/2021 Negative    • Glucose 10/01/2021 482*   • Urine Culture 10/01/2021 No growth    • Glucose 10/01/2021 386*   • RBC, UA 10/01/2021 13-20*   • WBC, UA 10/01/2021 31-50*   • Bacteria, UA 10/01/2021 2+*   • Squamous Epithelial Cell* 10/01/2021 0-2    • Hyaline Casts, UA 10/01/2021 None Seen    • Methodology 10/01/2021 Manual Light Microscopy             Assessment/Plan   Natalia Arzate is a very pleasant 36 y.o.  female who presents in new consultation at the request of YEIMI Frausto for further management and evaluation of Factor V Leiden mutation.     Factor V Leiden mutation  - At present she is on anticoagulation and has a filter in place will continue. Patient was previously evaluated by Dr. De Dios after she was found to have a provoked  DVT and bilateral PE after hormone therapy. 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 Given the provoked nature it was recommended that she continue anticoagulation for one year in which she was taking Coumadin. Will repeat homocysteine level, antithrombin panel, Protein C&S studies, as well as antiphospholipid studies at her next appointment.   - At present I am concerned she may be getting septic with tachycardia and low BP (she reports normal systolic's for her is 130s-150s) with similar symptoms to the way she presented in December.   - Therefore I strongly recommended ED evaluation. She was agreeable and sent to our ED for further evaluation. I called and discussed her case with the ED physician.     ACO / CHERELLE/Other  Quality measures  -  Natalia Arzate  reports that she has never smoked. She has never used smokeless tobacco.  -  Natalia Arzate received 2021 flu vaccine.  -  Natalia Arzate reports a pain score of 9.  Given her pain assessment as noted, treatment options were discussed and the following options were decided upon as a follow-up plan to address the patient's pain: being sent to ED  -  Current outpatient and discharge medications have been reconciled for the patient.  Reviewed by: Alison Constantino MD      I will have the patient return in follow up appointment in three weeks for lab evaluation as stated above. She understands that should she have any questions or concerns prior to her appointment she should give us a call at any time and I would be happy to see her sooner. It was a pleasure to see this patient in clinic today, thank you for allowing me to participate in the care of this patient.          Alison Constantino MD  03/04/22   17:02 EST     Addendum:  Thus far only a CBC and CMP has been sent will re-request records and hypercoagulable workup that has been previously done.  3/10/22

## 2022-03-04 NOTE — ED NOTES
Patient approached triage desk and stated she did not want to wait to be seen in the ER and her oncologist told her it was okay to leave but to come back if she started feeling worse. Patient signed out AMA.      Jacqueline Cabrales, ROB  03/04/22 2274

## 2022-03-06 LAB
BACTERIA SPEC AEROBE CULT: NORMAL
BACTERIA SPEC AEROBE CULT: NORMAL

## 2022-03-07 ENCOUNTER — HOSPITAL ENCOUNTER (EMERGENCY)
Facility: HOSPITAL | Age: 37
Discharge: HOME | End: 2022-03-07
Attending: EMERGENCY MEDICINE | Admitting: EMERGENCY MEDICINE

## 2022-03-07 ENCOUNTER — PATIENT ROUNDING (BHMG ONLY) (OUTPATIENT)
Dept: ONCOLOGY | Facility: CLINIC | Age: 37
End: 2022-03-07

## 2022-03-07 ENCOUNTER — APPOINTMENT (OUTPATIENT)
Dept: CT IMAGING | Facility: HOSPITAL | Age: 37
End: 2022-03-07

## 2022-03-07 VITALS
DIASTOLIC BLOOD PRESSURE: 97 MMHG | OXYGEN SATURATION: 96 % | RESPIRATION RATE: 18 BRPM | TEMPERATURE: 98.9 F | HEIGHT: 64 IN | SYSTOLIC BLOOD PRESSURE: 145 MMHG | WEIGHT: 220 LBS | HEART RATE: 98 BPM | BODY MASS INDEX: 37.56 KG/M2

## 2022-03-07 DIAGNOSIS — N39.0 ACUTE UTI: Primary | ICD-10-CM

## 2022-03-07 LAB
ALBUMIN SERPL-MCNC: 3.84 G/DL (ref 3.5–5.2)
ALBUMIN/GLOB SERPL: 0.9 G/DL
ALP SERPL-CCNC: 131 U/L (ref 39–117)
ALT SERPL W P-5'-P-CCNC: 9 U/L (ref 1–33)
AMPHET+METHAMPHET UR QL: NEGATIVE
AMPHETAMINES UR QL: NEGATIVE
ANION GAP SERPL CALCULATED.3IONS-SCNC: 13.6 MMOL/L (ref 5–15)
AST SERPL-CCNC: 12 U/L (ref 1–32)
B-HCG UR QL: NEGATIVE
BACTERIA UR QL AUTO: ABNORMAL /HPF
BARBITURATES UR QL SCN: NEGATIVE
BASOPHILS # BLD AUTO: 0.03 10*3/MM3 (ref 0–0.2)
BASOPHILS NFR BLD AUTO: 0.5 % (ref 0–1.5)
BENZODIAZ UR QL SCN: NEGATIVE
BILIRUB SERPL-MCNC: 0.2 MG/DL (ref 0–1.2)
BILIRUB UR QL STRIP: NEGATIVE
BUN SERPL-MCNC: 23 MG/DL (ref 6–20)
BUN/CREAT SERPL: 29.9 (ref 7–25)
BUPRENORPHINE SERPL-MCNC: NEGATIVE NG/ML
CALCIUM SPEC-SCNC: 9.7 MG/DL (ref 8.6–10.5)
CANNABINOIDS SERPL QL: NEGATIVE
CHLORIDE SERPL-SCNC: 98 MMOL/L (ref 98–107)
CLARITY UR: ABNORMAL
CO2 SERPL-SCNC: 21.4 MMOL/L (ref 22–29)
COCAINE UR QL: NEGATIVE
COLOR UR: ABNORMAL
CREAT SERPL-MCNC: 0.77 MG/DL (ref 0.57–1)
DEPRECATED RDW RBC AUTO: 45.1 FL (ref 37–54)
EGFRCR SERPLBLD CKD-EPI 2021: 103.3 ML/MIN/1.73
EOSINOPHIL # BLD AUTO: 0.17 10*3/MM3 (ref 0–0.4)
EOSINOPHIL NFR BLD AUTO: 2.8 % (ref 0.3–6.2)
ERYTHROCYTE [DISTWIDTH] IN BLOOD BY AUTOMATED COUNT: 13.8 % (ref 12.3–15.4)
FLUAV RNA RESP QL NAA+PROBE: NOT DETECTED
FLUBV RNA RESP QL NAA+PROBE: NOT DETECTED
GLOBULIN UR ELPH-MCNC: 4.4 GM/DL
GLUCOSE SERPL-MCNC: 238 MG/DL (ref 65–99)
GLUCOSE UR STRIP-MCNC: ABNORMAL MG/DL
HCT VFR BLD AUTO: 29.3 % (ref 34–46.6)
HGB BLD-MCNC: 10.3 G/DL (ref 12–15.9)
HGB UR QL STRIP.AUTO: ABNORMAL
HYALINE CASTS UR QL AUTO: ABNORMAL /LPF
IMM GRANULOCYTES # BLD AUTO: 0.01 10*3/MM3 (ref 0–0.05)
IMM GRANULOCYTES NFR BLD AUTO: 0.2 % (ref 0–0.5)
KETONES UR QL STRIP: NEGATIVE
LEUKOCYTE ESTERASE UR QL STRIP.AUTO: ABNORMAL
LYMPHOCYTES # BLD AUTO: 1.53 10*3/MM3 (ref 0.7–3.1)
LYMPHOCYTES NFR BLD AUTO: 25.3 % (ref 19.6–45.3)
MAGNESIUM SERPL-MCNC: 1.6 MG/DL (ref 1.6–2.6)
MCH RBC QN AUTO: 31.8 PG (ref 26.6–33)
MCHC RBC AUTO-ENTMCNC: 35.2 G/DL (ref 31.5–35.7)
MCV RBC AUTO: 90.4 FL (ref 79–97)
METHADONE UR QL SCN: NEGATIVE
MONOCYTES # BLD AUTO: 0.45 10*3/MM3 (ref 0.1–0.9)
MONOCYTES NFR BLD AUTO: 7.4 % (ref 5–12)
NEUTROPHILS NFR BLD AUTO: 3.86 10*3/MM3 (ref 1.7–7)
NEUTROPHILS NFR BLD AUTO: 63.8 % (ref 42.7–76)
NITRITE UR QL STRIP: NEGATIVE
NRBC BLD AUTO-RTO: 0 /100 WBC (ref 0–0.2)
OPIATES UR QL: NEGATIVE
OXYCODONE UR QL SCN: NEGATIVE
PCP UR QL SCN: NEGATIVE
PH UR STRIP.AUTO: 6 [PH] (ref 5–8)
PLATELET # BLD AUTO: 320 10*3/MM3 (ref 140–450)
PMV BLD AUTO: 8.1 FL (ref 6–12)
POTASSIUM SERPL-SCNC: 4 MMOL/L (ref 3.5–5.2)
PROPOXYPH UR QL: NEGATIVE
PROT SERPL-MCNC: 8.2 G/DL (ref 6–8.5)
PROT UR QL STRIP: ABNORMAL
RBC # BLD AUTO: 3.24 10*6/MM3 (ref 3.77–5.28)
RBC # UR STRIP: ABNORMAL /HPF
REF LAB TEST METHOD: ABNORMAL
SARS-COV-2 RNA RESP QL NAA+PROBE: NOT DETECTED
SODIUM SERPL-SCNC: 133 MMOL/L (ref 136–145)
SP GR UR STRIP: 1.02 (ref 1–1.03)
SQUAMOUS #/AREA URNS HPF: ABNORMAL /HPF
TRICYCLICS UR QL SCN: NEGATIVE
UROBILINOGEN UR QL STRIP: ABNORMAL
WBC # UR STRIP: ABNORMAL /HPF
WBC NRBC COR # BLD: 6.05 10*3/MM3 (ref 3.4–10.8)

## 2022-03-07 PROCEDURE — 80053 COMPREHEN METABOLIC PANEL: CPT | Performed by: EMERGENCY MEDICINE

## 2022-03-07 PROCEDURE — 87086 URINE CULTURE/COLONY COUNT: CPT | Performed by: EMERGENCY MEDICINE

## 2022-03-07 PROCEDURE — 81025 URINE PREGNANCY TEST: CPT | Performed by: EMERGENCY MEDICINE

## 2022-03-07 PROCEDURE — 80306 DRUG TEST PRSMV INSTRMNT: CPT | Performed by: EMERGENCY MEDICINE

## 2022-03-07 PROCEDURE — 87636 SARSCOV2 & INF A&B AMP PRB: CPT | Performed by: EMERGENCY MEDICINE

## 2022-03-07 PROCEDURE — 36415 COLL VENOUS BLD VENIPUNCTURE: CPT

## 2022-03-07 PROCEDURE — 85025 COMPLETE CBC W/AUTO DIFF WBC: CPT | Performed by: EMERGENCY MEDICINE

## 2022-03-07 PROCEDURE — 74176 CT ABD & PELVIS W/O CONTRAST: CPT

## 2022-03-07 PROCEDURE — 99283 EMERGENCY DEPT VISIT LOW MDM: CPT

## 2022-03-07 PROCEDURE — 83735 ASSAY OF MAGNESIUM: CPT | Performed by: EMERGENCY MEDICINE

## 2022-03-07 PROCEDURE — 81001 URINALYSIS AUTO W/SCOPE: CPT | Performed by: EMERGENCY MEDICINE

## 2022-03-07 RX ORDER — OXYCODONE AND ACETAMINOPHEN 10; 325 MG/1; MG/1
1 TABLET ORAL ONCE
Status: COMPLETED | OUTPATIENT
Start: 2022-03-07 | End: 2022-03-07

## 2022-03-07 RX ORDER — DOXYCYCLINE 100 MG/1
100 CAPSULE ORAL 2 TIMES DAILY
Qty: 14 CAPSULE | Refills: 0 | Status: SHIPPED | OUTPATIENT
Start: 2022-03-07 | End: 2022-03-14

## 2022-03-07 RX ADMIN — OXYCODONE HYDROCHLORIDE AND ACETAMINOPHEN 1 TABLET: 10; 325 TABLET ORAL at 21:15

## 2022-03-07 RX ADMIN — SODIUM CHLORIDE 500 ML: 9 INJECTION, SOLUTION INTRAVENOUS at 20:49

## 2022-03-08 NOTE — ED PROVIDER NOTES
Subjective   Patient presents to ER with left flank pain. Had recent hospitalization at  for same, and has drain in place      Back Pain  Location:  Lumbar spine  Quality:  Cramping and aching  Pain severity:  Moderate  Onset quality:  Gradual  Timing:  Intermittent  Progression:  Waxing and waning  Chronicity:  Recurrent  Relieved by:  Nothing  Worsened by:  Nothing  Ineffective treatments:  None tried  Associated symptoms: abdominal pain        Review of Systems   Constitutional: Positive for activity change.   HENT: Negative.    Eyes: Negative.    Respiratory: Negative.    Cardiovascular: Negative.    Gastrointestinal: Positive for abdominal distention, abdominal pain and nausea.   Endocrine: Negative.    Genitourinary: Negative.    Musculoskeletal: Positive for back pain.   Skin: Negative.    Allergic/Immunologic: Negative.    Hematological: Negative.    Psychiatric/Behavioral: Negative.        Past Medical History:   Diagnosis Date   • A-fib (HCC)    • Abnormal ECG    • Anemia    • Anxiety    • Asthma    • Cancer (HCC)     Ovarian   • Depression    • Diabetes mellitus (HCC)    • DVT (deep venous thrombosis) (HCC)    • DVT (deep venous thrombosis) (HCC)    • Factor 5 Leiden mutation, heterozygous (HCC)    • Fibroid    • GERD (gastroesophageal reflux disease)    • Gestational diabetes    • Gout    • Hyperlipidemia    • Hypothyroid    • Kidney stone    • Migraines    • Neuropathy    • Ovarian cancer (HCC) Feb 2021   • Ovarian cyst    • PE (pulmonary embolism)    • Polycystic ovary syndrome    • Preeclampsia    • Rh incompatibility    • Stroke (HCC)    • TIA (transient ischemic attack)    • Urinary tract infection    • Varicella        Allergies   Allergen Reactions   • Amoxicillin Hives   • Haldol [Haloperidol] Hives   • Penicillins Hives   • Toradol [Ketorolac Tromethamine] Hives   • Tramadol Swelling       Past Surgical History:   Procedure Laterality Date   • ABDOMINAL SURGERY     • CARDIAC CATHETERIZATION    "  •  SECTION     • CHOLECYSTECTOMY     • COLONOSCOPY     • ENDOSCOPY     • EXTRACORPOREAL SHOCK WAVE LITHOTRIPSY (ESWL) Left 10/22/2021    Procedure: EXTRACORPOREAL SHOCKWAVE LITHOTRIPSY;  Surgeon: Milan Motley MD;  Location: [facility];  Service: Urology;  Laterality: Left;   • LITHOTRIPSY     • RIGHT OOPHORECTOMY     • URETEROSCOPY LASER LITHOTRIPSY WITH STENT INSERTION Left 10/1/2021    Procedure: URETEROSCOPY WITH STENT PLACEMENT;  Surgeon: Milan Motley MD;  Location: [facility];  Service: Urology;  Laterality: Left;       Family History   Problem Relation Age of Onset   • Hypertension Mother    • Diabetes Father    • Hypertension Father    • Heart attack Father    • No Known Problems Sister    • No Known Problems Brother    • No Known Problems Son    • No Known Problems Daughter    • No Known Problems Maternal Grandmother    • No Known Problems Maternal Grandfather    • No Known Problems Paternal Grandmother    • No Known Problems Paternal Grandfather    • No Known Problems Cousin    • Rheum arthritis Neg Hx    • Osteoarthritis Neg Hx    • Asthma Neg Hx    • Heart failure Neg Hx    • Hyperlipidemia Neg Hx    • Migraines Neg Hx    • Rashes / Skin problems Neg Hx    • Seizures Neg Hx    • Stroke Neg Hx    • Thyroid disease Neg Hx        Social History     Socioeconomic History   • Marital status:    Tobacco Use   • Smoking status: Never Smoker   • Smokeless tobacco: Never Used   Vaping Use   • Vaping Use: Never used   Substance and Sexual Activity   • Alcohol use: No   • Drug use: Never     Comment: Pt has track marks on right arm. Pt states \"It's from my INR being drawn.\"    • Sexual activity: Not Currently     Partners: Male     Birth control/protection: None           Objective   Physical Exam  Vitals and nursing note reviewed.   Constitutional:       Appearance: Normal appearance.   HENT:      Head: Normocephalic.      Nose: Nose normal.      Mouth/Throat:      Mouth: " Mucous membranes are moist.   Eyes:      Pupils: Pupils are equal, round, and reactive to light.   Cardiovascular:      Rate and Rhythm: Normal rate.      Pulses: Normal pulses.   Pulmonary:      Effort: Pulmonary effort is normal.   Abdominal:      General: Abdomen is flat.   Musculoskeletal:         General: Normal range of motion.      Cervical back: Normal range of motion.   Skin:     General: Skin is warm.      Capillary Refill: Capillary refill takes less than 2 seconds.   Neurological:      General: No focal deficit present.      Mental Status: She is alert.   Psychiatric:         Mood and Affect: Mood normal.         Procedures           ED Course                                                 MDM    Final diagnoses:   Acute UTI       ED Disposition  ED Disposition     ED Disposition   Discharge    Condition   Stable    Comment   --             Eddie Latif, APRN  [address] [facility] [address] [phone]    Schedule an appointment as soon as possible for a visit   If symptoms worsen         Medication List      New Prescriptions    doxycycline 100 MG capsule  Commonly known as: MONODOX  Take 1 capsule by mouth 2 (Two) Times a Day.           Where to Get Your Medications      These medications were sent to [facility] - [place], [place] - [address] [place] [address] - [phone] [facility] [phone] [address] [address]    Phone: [phone]   · doxycycline 100 MG capsule          Francisco J Kate MD  03/07/22 0663

## 2022-03-09 ENCOUNTER — NURSE TRIAGE (OUTPATIENT)
Dept: CALL CENTER | Facility: HOSPITAL | Age: 37
End: 2022-03-09

## 2022-03-09 LAB — BACTERIA SPEC AEROBE CULT: NORMAL

## 2022-03-10 ENCOUNTER — TELEMEDICINE (OUTPATIENT)
Dept: FAMILY MEDICINE CLINIC | Facility: CLINIC | Age: 37
End: 2022-03-10

## 2022-03-10 ENCOUNTER — HOSPITAL ENCOUNTER (EMERGENCY)
Facility: HOSPITAL | Age: 37
Discharge: HOME | End: 2022-03-10
Attending: EMERGENCY MEDICINE | Admitting: EMERGENCY MEDICINE

## 2022-03-10 VITALS — WEIGHT: 220 LBS | BODY MASS INDEX: 37.56 KG/M2 | HEIGHT: 64 IN

## 2022-03-10 VITALS
RESPIRATION RATE: 18 BRPM | HEIGHT: 64 IN | TEMPERATURE: 99.1 F | OXYGEN SATURATION: 98 % | WEIGHT: 220 LBS | BODY MASS INDEX: 37.56 KG/M2 | DIASTOLIC BLOOD PRESSURE: 108 MMHG | HEART RATE: 112 BPM | SYSTOLIC BLOOD PRESSURE: 154 MMHG

## 2022-03-10 DIAGNOSIS — N20.0 RENAL CALCULUS, LEFT: ICD-10-CM

## 2022-03-10 DIAGNOSIS — E78.5 DYSLIPIDEMIA: Chronic | ICD-10-CM

## 2022-03-10 DIAGNOSIS — Z79.4 TYPE 2 DIABETES MELLITUS WITH DIABETIC NEUROPATHY, WITH LONG-TERM CURRENT USE OF INSULIN: Primary | Chronic | ICD-10-CM

## 2022-03-10 DIAGNOSIS — E03.9 HYPOTHYROIDISM, UNSPECIFIED TYPE: Chronic | ICD-10-CM

## 2022-03-10 DIAGNOSIS — R10.30 LOWER ABDOMINAL PAIN: Primary | ICD-10-CM

## 2022-03-10 DIAGNOSIS — E11.40 TYPE 2 DIABETES MELLITUS WITH DIABETIC NEUROPATHY, WITH LONG-TERM CURRENT USE OF INSULIN: Primary | Chronic | ICD-10-CM

## 2022-03-10 DIAGNOSIS — E53.8 VITAMIN B 12 DEFICIENCY: ICD-10-CM

## 2022-03-10 DIAGNOSIS — I10 PRIMARY HYPERTENSION: Chronic | ICD-10-CM

## 2022-03-10 DIAGNOSIS — E55.9 VITAMIN D DEFICIENCY: ICD-10-CM

## 2022-03-10 DIAGNOSIS — D68.51 FACTOR 5 LEIDEN MUTATION, HETEROZYGOUS: Chronic | ICD-10-CM

## 2022-03-10 DIAGNOSIS — F43.10 PTSD (POST-TRAUMATIC STRESS DISORDER): Chronic | ICD-10-CM

## 2022-03-10 DIAGNOSIS — L89.156 PRESSURE INJURY OF DEEP TISSUE OF SACRAL REGION: ICD-10-CM

## 2022-03-10 LAB
ALBUMIN SERPL-MCNC: 3.89 G/DL (ref 3.5–5.2)
ALBUMIN/GLOB SERPL: 0.9 G/DL
ALP SERPL-CCNC: 124 U/L (ref 39–117)
ALT SERPL W P-5'-P-CCNC: 9 U/L (ref 1–33)
ANION GAP SERPL CALCULATED.3IONS-SCNC: 14.9 MMOL/L (ref 5–15)
AST SERPL-CCNC: 10 U/L (ref 1–32)
BACTERIA UR QL AUTO: ABNORMAL /HPF
BASOPHILS # BLD AUTO: 0.03 10*3/MM3 (ref 0–0.2)
BASOPHILS NFR BLD AUTO: 0.5 % (ref 0–1.5)
BILIRUB SERPL-MCNC: 0.2 MG/DL (ref 0–1.2)
BILIRUB UR QL STRIP: NEGATIVE
BUN SERPL-MCNC: 26 MG/DL (ref 6–20)
BUN/CREAT SERPL: 32.5 (ref 7–25)
CALCIUM SPEC-SCNC: 9.4 MG/DL (ref 8.6–10.5)
CHLORIDE SERPL-SCNC: 99 MMOL/L (ref 98–107)
CLARITY UR: ABNORMAL
CO2 SERPL-SCNC: 22.1 MMOL/L (ref 22–29)
COLOR UR: YELLOW
CREAT SERPL-MCNC: 0.8 MG/DL (ref 0.57–1)
DEPRECATED RDW RBC AUTO: 44.6 FL (ref 37–54)
EGFRCR SERPLBLD CKD-EPI 2021: 98.7 ML/MIN/1.73
EOSINOPHIL # BLD AUTO: 0.13 10*3/MM3 (ref 0–0.4)
EOSINOPHIL NFR BLD AUTO: 2.1 % (ref 0.3–6.2)
ERYTHROCYTE [DISTWIDTH] IN BLOOD BY AUTOMATED COUNT: 13.8 % (ref 12.3–15.4)
GLOBULIN UR ELPH-MCNC: 4.5 GM/DL
GLUCOSE SERPL-MCNC: 199 MG/DL (ref 65–99)
GLUCOSE UR STRIP-MCNC: ABNORMAL MG/DL
HCT VFR BLD AUTO: 28.7 % (ref 34–46.6)
HGB BLD-MCNC: 9.9 G/DL (ref 12–15.9)
HGB UR QL STRIP.AUTO: ABNORMAL
HYALINE CASTS UR QL AUTO: ABNORMAL /LPF
IMM GRANULOCYTES # BLD AUTO: 0.02 10*3/MM3 (ref 0–0.05)
IMM GRANULOCYTES NFR BLD AUTO: 0.3 % (ref 0–0.5)
KETONES UR QL STRIP: NEGATIVE
LEUKOCYTE ESTERASE UR QL STRIP.AUTO: ABNORMAL
LYMPHOCYTES # BLD AUTO: 1.88 10*3/MM3 (ref 0.7–3.1)
LYMPHOCYTES NFR BLD AUTO: 29.7 % (ref 19.6–45.3)
MCH RBC QN AUTO: 31.4 PG (ref 26.6–33)
MCHC RBC AUTO-ENTMCNC: 34.5 G/DL (ref 31.5–35.7)
MCV RBC AUTO: 91.1 FL (ref 79–97)
MONOCYTES # BLD AUTO: 0.53 10*3/MM3 (ref 0.1–0.9)
MONOCYTES NFR BLD AUTO: 8.4 % (ref 5–12)
NEUTROPHILS NFR BLD AUTO: 3.75 10*3/MM3 (ref 1.7–7)
NEUTROPHILS NFR BLD AUTO: 59 % (ref 42.7–76)
NITRITE UR QL STRIP: POSITIVE
NRBC BLD AUTO-RTO: 0 /100 WBC (ref 0–0.2)
PH UR STRIP.AUTO: 5.5 [PH] (ref 5–8)
PLATELET # BLD AUTO: 303 10*3/MM3 (ref 140–450)
PMV BLD AUTO: 8 FL (ref 6–12)
POTASSIUM SERPL-SCNC: 3.9 MMOL/L (ref 3.5–5.2)
PROT SERPL-MCNC: 8.4 G/DL (ref 6–8.5)
PROT UR QL STRIP: ABNORMAL
RBC # BLD AUTO: 3.15 10*6/MM3 (ref 3.77–5.28)
RBC # UR STRIP: ABNORMAL /HPF
REF LAB TEST METHOD: ABNORMAL
SODIUM SERPL-SCNC: 136 MMOL/L (ref 136–145)
SP GR UR STRIP: 1.02 (ref 1–1.03)
SQUAMOUS #/AREA URNS HPF: ABNORMAL /HPF
UROBILINOGEN UR QL STRIP: ABNORMAL
WBC # UR STRIP: ABNORMAL /HPF
WBC NRBC COR # BLD: 6.34 10*3/MM3 (ref 3.4–10.8)

## 2022-03-10 PROCEDURE — 80053 COMPREHEN METABOLIC PANEL: CPT | Performed by: EMERGENCY MEDICINE

## 2022-03-10 PROCEDURE — 99214 OFFICE O/P EST MOD 30 MIN: CPT | Performed by: NURSE PRACTITIONER

## 2022-03-10 PROCEDURE — 99283 EMERGENCY DEPT VISIT LOW MDM: CPT

## 2022-03-10 PROCEDURE — 85025 COMPLETE CBC W/AUTO DIFF WBC: CPT | Performed by: EMERGENCY MEDICINE

## 2022-03-10 PROCEDURE — 96375 TX/PRO/DX INJ NEW DRUG ADDON: CPT

## 2022-03-10 PROCEDURE — 25010000002 ONDANSETRON PER 1 MG: Performed by: EMERGENCY MEDICINE

## 2022-03-10 PROCEDURE — 96376 TX/PRO/DX INJ SAME DRUG ADON: CPT

## 2022-03-10 PROCEDURE — 81001 URINALYSIS AUTO W/SCOPE: CPT | Performed by: EMERGENCY MEDICINE

## 2022-03-10 PROCEDURE — 87086 URINE CULTURE/COLONY COUNT: CPT | Performed by: EMERGENCY MEDICINE

## 2022-03-10 PROCEDURE — 25010000002 MORPHINE PER 10 MG: Performed by: EMERGENCY MEDICINE

## 2022-03-10 PROCEDURE — 96374 THER/PROPH/DIAG INJ IV PUSH: CPT

## 2022-03-10 RX ORDER — ONDANSETRON 2 MG/ML
4 INJECTION INTRAMUSCULAR; INTRAVENOUS ONCE
Status: COMPLETED | OUTPATIENT
Start: 2022-03-10 | End: 2022-03-10

## 2022-03-10 RX ORDER — MORPHINE SULFATE 2 MG/ML
2 INJECTION, SOLUTION INTRAMUSCULAR; INTRAVENOUS ONCE
Status: COMPLETED | OUTPATIENT
Start: 2022-03-10 | End: 2022-03-10

## 2022-03-10 RX ADMIN — ONDANSETRON 4 MG: 2 INJECTION INTRAMUSCULAR; INTRAVENOUS at 01:34

## 2022-03-10 RX ADMIN — MORPHINE SULFATE 2 MG: 2 INJECTION, SOLUTION INTRAMUSCULAR; INTRAVENOUS at 03:07

## 2022-03-10 RX ADMIN — MORPHINE SULFATE 4 MG: 4 INJECTION INTRAVENOUS at 01:34

## 2022-03-10 RX ADMIN — SODIUM CHLORIDE 1000 ML: 9 INJECTION, SOLUTION INTRAVENOUS at 01:33

## 2022-03-10 NOTE — DISCHARGE INSTRUCTIONS
Your testing here was normal.  There was no sign of dehydration.  It appears that your infection is trending in the right direction and hopefully will be able to get the drain out as planned in a couple of weeks.  It would be best to continue the antibiotic but it does not appear that there has been significant urine infection.

## 2022-03-10 NOTE — PROGRESS NOTES
You have chosen to receive care through a telehealth visit.  Do you consent to use a video/audio connection for your medical care today? Yes    History of Present Illness  Natalia Arzate is a 36 y.o. female who presents to the clinic today via video. She was hospitalized at [facility] after a very lengthy hospitalization from 12/26/2021 until she was discharged on 2/15/2022.  She was admitted for a disseminated infection.  She presented emergently at [facility] on 12/26/2021 following a fall at home. She was transferred to . She was  diagnosed with L Pyelonephritis complicated by multiple abscesses as well as superinfection of L iliopsoas hematoma on admission at [facility]. During admission, she developed acute hypoxic respiratory failure presumed due to aspiration and septic shock. She was transferred to ICU. She was intubated and required CRRT/HD while in ICU. Eventually transferred to the floor no longer requiring HD. During hospitalization, she had an acute/subacute R cerebellar ischemic stroke on 1/26/2022 with recommendation to continue on Anticoagulation.   Natalia has been diagnosed with Factor V Leiden, DM, type 2 with neuropathy currently treated with insulin, HTN, and PTSD. In addition, she has HTN, Dyslipidemia, Hypothyroidism, Renal Stones and Gout.     She does have Home Health services including Skilled Nursing on Fridays and  Physical and Occupational Therapy twice weekly. She feels this is helping. She still continues to be unable to ambulate but she is standing. Her daughter is doing her sacral pressure injury wound and she shares it is improving.     Diabetes  She has type 2 diabetes mellitus treated with insulin both basal and fast acting. Recently, started using a DexCom which is helping her.  Associated symptoms include fatigue and weakness.  Risk factors for coronary artery disease include diabetes mellitus, dyslipidemia, hypertension, obesity, stress and sedentary lifestyle.  "Last A1C was 8.7 on 12/27/2021    Hypertension  Currently taking Amlodipine and reports her bp has been elevated. Had previously been on Metoprolol as well.     Dyslipidemia  Prescribed Omega     Hypothyroidism    Lab Results   Component Value Date    TSH 3.780 03/13/2022       Kidney Disease  Previous Hydronephrosis and obstructing stone. Previous and current stent. Treated by Dr Motley and  Medical group    The following portions of the patient's history were reviewed and updated as appropriate: allergies, current medications, past family history, past medical history, past social history, past surgical history and problem list.    Review of Systems   Constitutional: Positive for activity change, appetite change and fatigue. Negative for fever and unexpected weight change.   Eyes: Negative for visual disturbance.   Respiratory: Negative for cough, shortness of breath and wheezing.    Cardiovascular: Positive for leg swelling. Negative for chest pain and palpitations.   Gastrointestinal: Negative for constipation and diarrhea.   Endocrine: Negative for cold intolerance, heat intolerance, polydipsia, polyphagia and polyuria.   Genitourinary:        Urinary Incontinence intermittently secondary to long term catheter while hospitalized    Musculoskeletal: Positive for arthralgias and gait problem.   Skin: Positive for wound (Sacral Pressure injury). Negative for color change.   Neurological: Positive for weakness, numbness and headaches. Negative for light-headedness.   Hematological: Negative for adenopathy.   Psychiatric/Behavioral: Positive for sleep disturbance. Negative for decreased concentration.   All other systems reviewed and are negative.    Vital signs:  Ht 162.6 cm (64.02\")   Wt 99.8 kg (220 lb)   LMP 10/31/2017   BMI 37.74 kg/m²     Physical Exam  Constitutional:       General: She is not in acute distress.  HENT:      Head: Normocephalic.   Eyes:      General: No scleral icterus.        Right eye: " No discharge.         Left eye: No discharge.      Conjunctiva/sclera: Conjunctivae normal.   Musculoskeletal:      Cervical back: Neck supple.   Neurological:      Mental Status: She is alert.   Psychiatric:         Mood and Affect: Mood and affect normal.         Speech: Speech normal.         Behavior: Behavior is cooperative.         Thought Content: Thought content normal.         Cognition and Memory: Cognition and memory normal.       Result Review : Multiple ED visits  Patient's Body mass index is 37.74 kg/m². indicating that she is obese (BMI >30). Obesity-related health conditions include the following: hypertension, diabetes mellitus, dyslipidemia and GERD. Obesity is improving with lifestyle modifications. BMI  is above average; BMI management plan is completed   Assessment/Plan     Diagnoses and all orders for this visit:    1. Type 2 diabetes mellitus with diabetic neuropathy, with long-term current use of insulin ([facility]) (Primary)  Comments:  Will add Ozempic at this time  Orders:  -     Semaglutide,0.25 or 0.5MG/DOS, (Ozempic, 0.25 or 0.5 MG/DOSE,) 2 MG/1.5ML solution pen-injector; Inject 0.25 mg for four weeks then increase to 0.5 mg  Dispense: 1 pen; Refill: 0    2. Primary hypertension  Comments:  Add Metoprolol  Orders:  -     metoprolol succinate XL (TOPROL-XL) 25 MG 24 hr tablet; Take 1 tablet by mouth Daily.  Dispense: 30 tablet; Refill: 0    3. Dyslipidemia  Comments:  Lipid panel ordered soon    4. PTSD (post-traumatic stress disorder)  Comments:  Offered  referral but she feels she is doing well with Cymbalta.     5. Factor 5 Leiden mutation, heterozygous ([facility])  Comments:  F/U with Hematologist    6. Hypothyroidism, unspecified type  Comments:  Is not on medication at this time. Will monitor     7. Renal calculus, left  Comments:  F/U with Urologist    8. Pressure injury of deep tissue of sacral region  Comments:  Continue current wound care. Continue to monitor     9. Vitamin D  deficiency  Comments:  Weekly Vit D     10. Vitamin B 12 deficiency  Comments:  Weekly Vit B`12    Follow Up March 22, 2022  Findings and recommendations discussed with Natalia. Reviewed treatment options. She will follow up on March 22nd sooner if problems/concerns occur.    Unable to complete visit using a video connection to the patient. A phone visit was used to complete this visits. Total time of discussion was 30 minutes.    This document has been electronically signed by:       No

## 2022-03-10 NOTE — ED PROVIDER NOTES
Subjective   Patient is a 36-year-old white female who has come emergency department because of vomiting and indicates that she had been vomiting quite a bit throughout the day.  She feels like she is dehydrated.  Patient is well-known to the department due to frequent visits.  Sometime ago the patient developed some sort of abscess in her abdomen and was hospitalized at  for several weeks.  She still has a drain in place and indicates that it is draining less and less.  She was imaged in early March which was compared to a scan that was done in February and the abscess continues to get smaller and smaller.  She also points to pain in the lateral abdomen over the iliac wing.  During the interview she has for something for pain.          Review of Systems   Constitutional: Negative.  Negative for fever.   HENT: Negative.    Respiratory: Negative.    Cardiovascular: Negative.  Negative for chest pain.   Gastrointestinal: Negative.  Negative for abdominal pain.   Endocrine: Negative.    Genitourinary: Negative.  Negative for dysuria.   Skin: Negative.    Neurological: Negative.    Psychiatric/Behavioral: Negative.    All other systems reviewed and are negative.      Past Medical History:   Diagnosis Date   • A-fib (HCC)    • Abnormal ECG    • Anemia    • Anxiety    • Asthma    • Cancer (HCC)     Ovarian   • Depression    • Diabetes mellitus (HCC)    • DVT (deep venous thrombosis) (HCC)    • DVT (deep venous thrombosis) (HCC)    • Factor 5 Leiden mutation, heterozygous (HCC)    • Fibroid    • GERD (gastroesophageal reflux disease)    • Gestational diabetes    • Gout    • Hyperlipidemia    • Hypothyroid    • Kidney stone    • Migraines    • Neuropathy    • Ovarian cancer (HCC) Feb 2021   • Ovarian cyst    • PE (pulmonary embolism)    • Polycystic ovary syndrome    • Preeclampsia    • Rh incompatibility    • Stroke (HCC)    • TIA (transient ischemic attack)    • Urinary tract infection    • Varicella        Allergies  "  Allergen Reactions   • Amoxicillin Hives   • Haldol [Haloperidol] Hives   • Penicillins Hives   • Toradol [Ketorolac Tromethamine] Hives   • Tramadol Swelling       Past Surgical History:   Procedure Laterality Date   • ABDOMINAL SURGERY     • CARDIAC CATHETERIZATION     •  SECTION     • CHOLECYSTECTOMY     • COLONOSCOPY     • ENDOSCOPY     • EXTRACORPOREAL SHOCK WAVE LITHOTRIPSY (ESWL) Left 10/22/2021    Procedure: EXTRACORPOREAL SHOCKWAVE LITHOTRIPSY;  Surgeon: Milan Motley MD;  Location: [facility];  Service: Urology;  Laterality: Left;   • LITHOTRIPSY     • RIGHT OOPHORECTOMY     • URETEROSCOPY LASER LITHOTRIPSY WITH STENT INSERTION Left 10/1/2021    Procedure: URETEROSCOPY WITH STENT PLACEMENT;  Surgeon: Milan Motley MD;  Location: [facility];  Service: Urology;  Laterality: Left;       Family History   Problem Relation Age of Onset   • Hypertension Mother    • Diabetes Father    • Hypertension Father    • Heart attack Father    • No Known Problems Sister    • No Known Problems Brother    • No Known Problems Son    • No Known Problems Daughter    • No Known Problems Maternal Grandmother    • No Known Problems Maternal Grandfather    • No Known Problems Paternal Grandmother    • No Known Problems Paternal Grandfather    • No Known Problems Cousin    • Rheum arthritis Neg Hx    • Osteoarthritis Neg Hx    • Asthma Neg Hx    • Heart failure Neg Hx    • Hyperlipidemia Neg Hx    • Migraines Neg Hx    • Rashes / Skin problems Neg Hx    • Seizures Neg Hx    • Stroke Neg Hx    • Thyroid disease Neg Hx        Social History     Socioeconomic History   • Marital status:    Tobacco Use   • Smoking status: Never Smoker   • Smokeless tobacco: Never Used   Vaping Use   • Vaping Use: Never used   Substance and Sexual Activity   • Alcohol use: No   • Drug use: Never     Comment: Pt has track marks on right arm. Pt states \"It's from my INR being drawn.\"    • Sexual activity: Not Currently    "  Partners: Male     Birth control/protection: None           Objective   Physical Exam  Vitals and nursing note reviewed.   Constitutional:       General: She is not in acute distress.     Appearance: She is well-developed. She is obese. She is not ill-appearing, toxic-appearing or diaphoretic.   HENT:      Head: Normocephalic and atraumatic.      Right Ear: External ear normal.      Left Ear: External ear normal.      Nose: Nose normal.      Mouth/Throat:      Mouth: Mucous membranes are moist.   Eyes:      Conjunctiva/sclera: Conjunctivae normal.      Pupils: Pupils are equal, round, and reactive to light.   Neck:      Vascular: No JVD.      Trachea: No tracheal deviation.   Cardiovascular:      Rate and Rhythm: Normal rate and regular rhythm.      Heart sounds: Normal heart sounds. No murmur heard.  Pulmonary:      Effort: Pulmonary effort is normal. No respiratory distress.      Breath sounds: Normal breath sounds. No wheezing.   Abdominal:      General: Bowel sounds are normal.      Palpations: Abdomen is soft. There is no mass.      Tenderness: There is no abdominal tenderness. There is no right CVA tenderness, left CVA tenderness or guarding.      Hernia: No hernia is present.   Musculoskeletal:         General: No deformity. Normal range of motion.      Cervical back: Normal range of motion and neck supple.   Skin:     General: Skin is warm and dry.      Coloration: Skin is not pale.      Findings: No erythema or rash.   Neurological:      Mental Status: She is alert and oriented to person, place, and time.      Cranial Nerves: No cranial nerve deficit.   Psychiatric:         Behavior: Behavior normal.         Thought Content: Thought content normal.         Procedures           ED Course  ED Course as of 03/10/22 0321   Thu Mar 10, 2022   0116 The patient will be given doses of IV fluid and nausea medication and a dose of pain medication has also been ordered.  She would then be reassessed. [DS]      ED  Course User Index  [DS] Tod Yang MD                                                 MDM  Number of Diagnoses or Management Options  Diagnosis management comments: Patient's urine culture from the first of the month showed a mixed hafsa 50,000 colonies.  This is a negative culture.  She is on doxycycline and I have told her to continue it but we are releasing her home.  There is minimal drainage out of her drain and it appears that her abscess is resolving.  She is due to have the drain removed later in the month.  Her labs tonight otherwise were acceptable.      Final diagnoses:   Lower abdominal pain       ED Disposition  ED Disposition     ED Disposition   Discharge    Condition   Stable    Comment   --             No follow-up provider specified.       Medication List      No changes were made to your prescriptions during this visit.          Tod Yang MD  03/10/22 0554

## 2022-03-10 NOTE — TELEPHONE ENCOUNTER
"    Reason for Disposition  • [1] Looks infected (spreading redness, red streak, pus) AND [2] fever    Additional Information  • Negative: [1] Widespread rash AND [2] bright red, sunburn-like AND [3] too weak to stand  • Negative: Sounds like a life-threatening emergency to the triager  • Negative: Stitches (or staples) and not infected  • Negative: Skin glue used to close wound and not infected  • Negative:  surgical wound infection suspected  • Negative: Surgical wound infection suspected (post-op)  • Negative: Animal bite wound infection suspected  • Negative: Chronic wound care and Negative Pressure Wound Therapy (NPWT) symptoms and questions  • Negative: [1] Widespread rash AND [2] bright red, sunburn-like  • Negative: SEVERE pain in the wound  • Negative: Black (necrotic) or blisters develop in wound  • Negative: Patient sounds very sick or weak to the triager    Answer Assessment - Initial Assessment Questions  1. LOCATION: \"Where is the wound located?\"       Abdomen  2. WOUND APPEARANCE: \"What does the wound look like?\"       Red and draining green pus  3. SIZE: If redness is present, ask: \"What is the size of the red area?\" (Inches, centimeters, or compare to size of a coin)          4. SPREAD: \"What's changed in the last day?\"  \"Do you see any red streaks coming from the wound?\"      Green pus coming out of site  5. ONSET: \"When did it start to look infected?\"       2 days ago  6. MECHANISM: \"How did the wound start, what was the cause?\"      DAVID drain  7. PAIN: \"Is there any pain?\" If Yes, ask: \"How bad is the pain?\"   (Scale 1-10; or mild, moderate, severe)      Mild  8. FEVER: \"Do you have a fever?\" If Yes, ask: \"What is your temperature, how was it measured, and when did it start?\"      Temp of 100.0  9. OTHER SYMPTOMS: \"Do you have any other symptoms?\" (e.g., shaking chills, weakness, rash elsewhere on body)      Increase in BP  10. PREGNANCY: \"Is there any chance you are pregnant?\" \"When was " "your last menstrual period?\"        Denies    Protocols used: WOUND INFECTION-ADULT-AH      "

## 2022-03-11 ENCOUNTER — PRIOR AUTHORIZATION (OUTPATIENT)
Dept: FAMILY MEDICINE CLINIC | Facility: CLINIC | Age: 37
End: 2022-03-11

## 2022-03-11 LAB — BACTERIA SPEC AEROBE CULT: NORMAL

## 2022-03-13 ENCOUNTER — APPOINTMENT (OUTPATIENT)
Dept: GENERAL RADIOLOGY | Facility: HOSPITAL | Age: 37
End: 2022-03-13

## 2022-03-13 ENCOUNTER — APPOINTMENT (OUTPATIENT)
Dept: CT IMAGING | Facility: HOSPITAL | Age: 37
End: 2022-03-13

## 2022-03-13 ENCOUNTER — HOSPITAL ENCOUNTER (EMERGENCY)
Facility: HOSPITAL | Age: 37
Discharge: LEFT BEFORE DISCHARGE | End: 2022-03-15
Attending: EMERGENCY MEDICINE | Admitting: STUDENT IN AN ORGANIZED HEALTH CARE EDUCATION/TRAINING PROGRAM

## 2022-03-13 DIAGNOSIS — N12 PYELONEPHRITIS: ICD-10-CM

## 2022-03-13 DIAGNOSIS — K65.1 INTRA-ABDOMINAL ABSCESS: Primary | ICD-10-CM

## 2022-03-13 DIAGNOSIS — A41.9 SEPSIS, DUE TO UNSPECIFIED ORGANISM, UNSPECIFIED WHETHER ACUTE ORGAN DYSFUNCTION PRESENT: ICD-10-CM

## 2022-03-13 PROBLEM — R07.9 CHEST PAIN: Status: RESOLVED | Noted: 2019-04-15 | Resolved: 2022-03-13

## 2022-03-13 LAB
ALBUMIN SERPL-MCNC: 3.86 G/DL (ref 3.5–5.2)
ALBUMIN/GLOB SERPL: 0.9 G/DL
ALP SERPL-CCNC: 127 U/L (ref 39–117)
ALT SERPL W P-5'-P-CCNC: 12 U/L (ref 1–33)
ANION GAP SERPL CALCULATED.3IONS-SCNC: 14.4 MMOL/L (ref 5–15)
AST SERPL-CCNC: 14 U/L (ref 1–32)
BACTERIA UR QL AUTO: ABNORMAL /HPF
BASOPHILS # BLD AUTO: 0.04 10*3/MM3 (ref 0–0.2)
BASOPHILS NFR BLD AUTO: 0.6 % (ref 0–1.5)
BILIRUB SERPL-MCNC: 0.3 MG/DL (ref 0–1.2)
BILIRUB UR QL STRIP: NEGATIVE
BUN SERPL-MCNC: 25 MG/DL (ref 6–20)
BUN/CREAT SERPL: 31.3 (ref 7–25)
CA-I SERPL ISE-MCNC: 1.24 MMOL/L (ref 1.12–1.32)
CALCIUM SPEC-SCNC: 9.4 MG/DL (ref 8.6–10.5)
CHLORIDE SERPL-SCNC: 97 MMOL/L (ref 98–107)
CLARITY UR: CLEAR
CO2 SERPL-SCNC: 20.6 MMOL/L (ref 22–29)
COLOR UR: YELLOW
CREAT SERPL-MCNC: 0.8 MG/DL (ref 0.57–1)
CRP SERPL-MCNC: 1.6 MG/DL (ref 0–0.5)
D-LACTATE SERPL-SCNC: 2.4 MMOL/L (ref 0.5–2)
D-LACTATE SERPL-SCNC: 3.4 MMOL/L (ref 0.5–2)
DEPRECATED RDW RBC AUTO: 46.6 FL (ref 37–54)
EGFRCR SERPLBLD CKD-EPI 2021: 98.7 ML/MIN/1.73
EOSINOPHIL # BLD AUTO: 0.17 10*3/MM3 (ref 0–0.4)
EOSINOPHIL NFR BLD AUTO: 2.6 % (ref 0.3–6.2)
ERYTHROCYTE [DISTWIDTH] IN BLOOD BY AUTOMATED COUNT: 14.1 % (ref 12.3–15.4)
GLOBULIN UR ELPH-MCNC: 4.1 GM/DL
GLUCOSE SERPL-MCNC: 306 MG/DL (ref 65–99)
GLUCOSE UR STRIP-MCNC: ABNORMAL MG/DL
HCT VFR BLD AUTO: 30.3 % (ref 34–46.6)
HGB BLD-MCNC: 10.3 G/DL (ref 12–15.9)
HGB UR QL STRIP.AUTO: ABNORMAL
HOLD SPECIMEN: NORMAL
HYALINE CASTS UR QL AUTO: ABNORMAL /LPF
IMM GRANULOCYTES # BLD AUTO: 0.02 10*3/MM3 (ref 0–0.05)
IMM GRANULOCYTES NFR BLD AUTO: 0.3 % (ref 0–0.5)
KETONES UR QL STRIP: NEGATIVE
LEUKOCYTE ESTERASE UR QL STRIP.AUTO: ABNORMAL
LIPASE SERPL-CCNC: 52 U/L (ref 13–60)
LYMPHOCYTES # BLD AUTO: 1.89 10*3/MM3 (ref 0.7–3.1)
LYMPHOCYTES NFR BLD AUTO: 29.1 % (ref 19.6–45.3)
MAGNESIUM SERPL-MCNC: 1.5 MG/DL (ref 1.6–2.6)
MCH RBC QN AUTO: 31.7 PG (ref 26.6–33)
MCHC RBC AUTO-ENTMCNC: 34 G/DL (ref 31.5–35.7)
MCV RBC AUTO: 93.2 FL (ref 79–97)
MONOCYTES # BLD AUTO: 0.46 10*3/MM3 (ref 0.1–0.9)
MONOCYTES NFR BLD AUTO: 7.1 % (ref 5–12)
NEUTROPHILS NFR BLD AUTO: 3.91 10*3/MM3 (ref 1.7–7)
NEUTROPHILS NFR BLD AUTO: 60.3 % (ref 42.7–76)
NITRITE UR QL STRIP: NEGATIVE
NRBC BLD AUTO-RTO: 0 /100 WBC (ref 0–0.2)
PH UR STRIP.AUTO: <=5 [PH] (ref 5–8)
PLATELET # BLD AUTO: 311 10*3/MM3 (ref 140–450)
PMV BLD AUTO: 8.2 FL (ref 6–12)
POTASSIUM SERPL-SCNC: 4.2 MMOL/L (ref 3.5–5.2)
PROCALCITONIN SERPL-MCNC: 0.19 NG/ML (ref 0–0.25)
PROT SERPL-MCNC: 8 G/DL (ref 6–8.5)
PROT UR QL STRIP: ABNORMAL
QT INTERVAL: 354 MS
QTC INTERVAL: 470 MS
RBC # BLD AUTO: 3.25 10*6/MM3 (ref 3.77–5.28)
RBC # UR STRIP: ABNORMAL /HPF
REF LAB TEST METHOD: ABNORMAL
SODIUM SERPL-SCNC: 132 MMOL/L (ref 136–145)
SP GR UR STRIP: 1.02 (ref 1–1.03)
SQUAMOUS #/AREA URNS HPF: ABNORMAL /HPF
TROPONIN T SERPL-MCNC: <0.01 NG/ML (ref 0–0.03)
TSH SERPL DL<=0.05 MIU/L-ACNC: 3.78 UIU/ML (ref 0.27–4.2)
UROBILINOGEN UR QL STRIP: ABNORMAL
WBC # UR STRIP: ABNORMAL /HPF
WBC NRBC COR # BLD: 6.49 10*3/MM3 (ref 3.4–10.8)
WHOLE BLOOD HOLD SPECIMEN: NORMAL
WHOLE BLOOD HOLD SPECIMEN: NORMAL
YEAST URNS QL MICRO: ABNORMAL /HPF

## 2022-03-13 PROCEDURE — 86140 C-REACTIVE PROTEIN: CPT | Performed by: EMERGENCY MEDICINE

## 2022-03-13 PROCEDURE — 87040 BLOOD CULTURE FOR BACTERIA: CPT | Performed by: EMERGENCY MEDICINE

## 2022-03-13 PROCEDURE — 99285 EMERGENCY DEPT VISIT HI MDM: CPT

## 2022-03-13 PROCEDURE — 96375 TX/PRO/DX INJ NEW DRUG ADDON: CPT

## 2022-03-13 PROCEDURE — 93005 ELECTROCARDIOGRAM TRACING: CPT | Performed by: EMERGENCY MEDICINE

## 2022-03-13 PROCEDURE — 83605 ASSAY OF LACTIC ACID: CPT | Performed by: EMERGENCY MEDICINE

## 2022-03-13 PROCEDURE — 25010000002 PROCHLORPERAZINE 10 MG/2ML SOLUTION: Performed by: EMERGENCY MEDICINE

## 2022-03-13 PROCEDURE — 63710000001 PROMETHAZINE PER 25 MG: Performed by: EMERGENCY MEDICINE

## 2022-03-13 PROCEDURE — 99284 EMERGENCY DEPT VISIT MOD MDM: CPT

## 2022-03-13 PROCEDURE — 25010000002 IOPAMIDOL 61 % SOLUTION: Performed by: EMERGENCY MEDICINE

## 2022-03-13 PROCEDURE — 25010000002 VANCOMYCIN 5 G RECONSTITUTED SOLUTION: Performed by: EMERGENCY MEDICINE

## 2022-03-13 PROCEDURE — 82330 ASSAY OF CALCIUM: CPT | Performed by: EMERGENCY MEDICINE

## 2022-03-13 PROCEDURE — 96366 THER/PROPH/DIAG IV INF ADDON: CPT

## 2022-03-13 PROCEDURE — 25010000002 CEFTRIAXONE PER 250 MG: Performed by: EMERGENCY MEDICINE

## 2022-03-13 PROCEDURE — 84484 ASSAY OF TROPONIN QUANT: CPT | Performed by: EMERGENCY MEDICINE

## 2022-03-13 PROCEDURE — 25010000002 HYDROMORPHONE PER 4 MG: Performed by: EMERGENCY MEDICINE

## 2022-03-13 PROCEDURE — 96376 TX/PRO/DX INJ SAME DRUG ADON: CPT

## 2022-03-13 PROCEDURE — 71045 X-RAY EXAM CHEST 1 VIEW: CPT

## 2022-03-13 PROCEDURE — 81001 URINALYSIS AUTO W/SCOPE: CPT | Performed by: EMERGENCY MEDICINE

## 2022-03-13 PROCEDURE — 80053 COMPREHEN METABOLIC PANEL: CPT | Performed by: EMERGENCY MEDICINE

## 2022-03-13 PROCEDURE — 87086 URINE CULTURE/COLONY COUNT: CPT | Performed by: EMERGENCY MEDICINE

## 2022-03-13 PROCEDURE — 74177 CT ABD & PELVIS W/CONTRAST: CPT

## 2022-03-13 PROCEDURE — 83735 ASSAY OF MAGNESIUM: CPT | Performed by: EMERGENCY MEDICINE

## 2022-03-13 PROCEDURE — 84145 PROCALCITONIN (PCT): CPT | Performed by: EMERGENCY MEDICINE

## 2022-03-13 PROCEDURE — 96365 THER/PROPH/DIAG IV INF INIT: CPT

## 2022-03-13 PROCEDURE — 83690 ASSAY OF LIPASE: CPT | Performed by: EMERGENCY MEDICINE

## 2022-03-13 PROCEDURE — P9612 CATHETERIZE FOR URINE SPEC: HCPCS

## 2022-03-13 PROCEDURE — 85025 COMPLETE CBC W/AUTO DIFF WBC: CPT | Performed by: EMERGENCY MEDICINE

## 2022-03-13 PROCEDURE — 84443 ASSAY THYROID STIM HORMONE: CPT | Performed by: EMERGENCY MEDICINE

## 2022-03-13 PROCEDURE — 87070 CULTURE OTHR SPECIMN AEROBIC: CPT | Performed by: EMERGENCY MEDICINE

## 2022-03-13 PROCEDURE — 87205 SMEAR GRAM STAIN: CPT | Performed by: EMERGENCY MEDICINE

## 2022-03-13 PROCEDURE — 25010000002 HYDROMORPHONE 1 MG/ML SOLUTION: Performed by: EMERGENCY MEDICINE

## 2022-03-13 PROCEDURE — 96361 HYDRATE IV INFUSION ADD-ON: CPT

## 2022-03-13 RX ORDER — METOPROLOL SUCCINATE 25 MG/1
25 TABLET, EXTENDED RELEASE ORAL DAILY
Qty: 30 TABLET | Refills: 0 | Status: SHIPPED | OUTPATIENT
Start: 2022-03-13 | End: 2022-03-14

## 2022-03-13 RX ORDER — PROMETHAZINE HYDROCHLORIDE 25 MG/1
25 TABLET ORAL ONCE
Status: COMPLETED | OUTPATIENT
Start: 2022-03-13 | End: 2022-03-13

## 2022-03-13 RX ORDER — SODIUM CHLORIDE 9 MG/ML
125 INJECTION, SOLUTION INTRAVENOUS CONTINUOUS
Status: DISCONTINUED | OUTPATIENT
Start: 2022-03-13 | End: 2022-03-15 | Stop reason: HOSPADM

## 2022-03-13 RX ORDER — HYDROMORPHONE HYDROCHLORIDE 1 MG/ML
0.5 INJECTION, SOLUTION INTRAMUSCULAR; INTRAVENOUS; SUBCUTANEOUS ONCE
Status: COMPLETED | OUTPATIENT
Start: 2022-03-13 | End: 2022-03-13

## 2022-03-13 RX ORDER — METRONIDAZOLE 500 MG/100ML
500 INJECTION, SOLUTION INTRAVENOUS EVERY 8 HOURS
Status: DISCONTINUED | OUTPATIENT
Start: 2022-03-13 | End: 2022-03-15 | Stop reason: HOSPADM

## 2022-03-13 RX ORDER — SEMAGLUTIDE 1.34 MG/ML
INJECTION, SOLUTION SUBCUTANEOUS
Qty: 1 PEN | Refills: 0 | Status: SHIPPED | OUTPATIENT
Start: 2022-03-13 | End: 2022-03-14

## 2022-03-13 RX ORDER — PROCHLORPERAZINE EDISYLATE 5 MG/ML
5 INJECTION INTRAMUSCULAR; INTRAVENOUS ONCE
Status: COMPLETED | OUTPATIENT
Start: 2022-03-13 | End: 2022-03-13

## 2022-03-13 RX ORDER — PROCHLORPERAZINE EDISYLATE 5 MG/ML
5 INJECTION INTRAMUSCULAR; INTRAVENOUS EVERY 6 HOURS PRN
Status: DISCONTINUED | OUTPATIENT
Start: 2022-03-13 | End: 2022-03-15 | Stop reason: HOSPADM

## 2022-03-13 RX ORDER — SODIUM CHLORIDE 0.9 % (FLUSH) 0.9 %
10 SYRINGE (ML) INJECTION AS NEEDED
Status: DISCONTINUED | OUTPATIENT
Start: 2022-03-13 | End: 2022-03-15 | Stop reason: HOSPADM

## 2022-03-13 RX ORDER — HYDROMORPHONE HYDROCHLORIDE 1 MG/ML
0.5 INJECTION, SOLUTION INTRAMUSCULAR; INTRAVENOUS; SUBCUTANEOUS
Status: DISCONTINUED | OUTPATIENT
Start: 2022-03-13 | End: 2022-03-14

## 2022-03-13 RX ORDER — SODIUM CHLORIDE 9 MG/ML
125 INJECTION, SOLUTION INTRAVENOUS CONTINUOUS
Status: DISCONTINUED | OUTPATIENT
Start: 2022-03-13 | End: 2022-03-13

## 2022-03-13 RX ADMIN — HYDROMORPHONE HYDROCHLORIDE 0.5 MG: 1 INJECTION, SOLUTION INTRAMUSCULAR; INTRAVENOUS; SUBCUTANEOUS at 11:04

## 2022-03-13 RX ADMIN — SODIUM CHLORIDE 125 ML/HR: 9 INJECTION, SOLUTION INTRAVENOUS at 10:14

## 2022-03-13 RX ADMIN — METRONIDAZOLE 500 MG: 500 INJECTION, SOLUTION INTRAVENOUS at 12:55

## 2022-03-13 RX ADMIN — HYDROMORPHONE HYDROCHLORIDE 0.5 MG: 1 INJECTION, SOLUTION INTRAMUSCULAR; INTRAVENOUS; SUBCUTANEOUS at 13:00

## 2022-03-13 RX ADMIN — HYDROMORPHONE HYDROCHLORIDE 0.5 MG: 1 INJECTION, SOLUTION INTRAMUSCULAR; INTRAVENOUS; SUBCUTANEOUS at 15:00

## 2022-03-13 RX ADMIN — HYDROMORPHONE HYDROCHLORIDE 0.5 MG: 1 INJECTION, SOLUTION INTRAMUSCULAR; INTRAVENOUS; SUBCUTANEOUS at 18:10

## 2022-03-13 RX ADMIN — PROMETHAZINE HYDROCHLORIDE 25 MG: 25 TABLET ORAL at 16:34

## 2022-03-13 RX ADMIN — HYDROMORPHONE HYDROCHLORIDE 0.5 MG: 1 INJECTION, SOLUTION INTRAMUSCULAR; INTRAVENOUS; SUBCUTANEOUS at 21:49

## 2022-03-13 RX ADMIN — HYDROMORPHONE HYDROCHLORIDE 0.5 MG: 1 INJECTION, SOLUTION INTRAMUSCULAR; INTRAVENOUS; SUBCUTANEOUS at 17:08

## 2022-03-13 RX ADMIN — HYDROMORPHONE HYDROCHLORIDE 0.5 MG: 1 INJECTION, SOLUTION INTRAMUSCULAR; INTRAVENOUS; SUBCUTANEOUS at 16:07

## 2022-03-13 RX ADMIN — HYDROMORPHONE HYDROCHLORIDE 0.5 MG: 1 INJECTION, SOLUTION INTRAMUSCULAR; INTRAVENOUS; SUBCUTANEOUS at 11:59

## 2022-03-13 RX ADMIN — SODIUM CHLORIDE 1000 ML: 9 INJECTION, SOLUTION INTRAVENOUS at 10:14

## 2022-03-13 RX ADMIN — CEFTRIAXONE 2 G: 10 INJECTION, POWDER, FOR SOLUTION INTRAVENOUS at 10:48

## 2022-03-13 RX ADMIN — PROCHLORPERAZINE EDISYLATE 5 MG: 5 INJECTION INTRAMUSCULAR; INTRAVENOUS at 10:14

## 2022-03-13 RX ADMIN — HYDROMORPHONE HYDROCHLORIDE 0.5 MG: 1 INJECTION, SOLUTION INTRAMUSCULAR; INTRAVENOUS; SUBCUTANEOUS at 14:00

## 2022-03-13 RX ADMIN — METRONIDAZOLE 500 MG: 500 INJECTION, SOLUTION INTRAVENOUS at 20:28

## 2022-03-13 RX ADMIN — IOPAMIDOL 86 ML: 612 INJECTION, SOLUTION INTRAVENOUS at 10:39

## 2022-03-13 RX ADMIN — HYDROMORPHONE HYDROCHLORIDE 0.5 MG: 1 INJECTION, SOLUTION INTRAMUSCULAR; INTRAVENOUS; SUBCUTANEOUS at 10:15

## 2022-03-13 RX ADMIN — HYDROMORPHONE HYDROCHLORIDE 0.5 MG: 1 INJECTION, SOLUTION INTRAMUSCULAR; INTRAVENOUS; SUBCUTANEOUS at 19:30

## 2022-03-13 RX ADMIN — VANCOMYCIN HYDROCHLORIDE 2000 MG: 5 INJECTION, POWDER, LYOPHILIZED, FOR SOLUTION INTRAVENOUS at 12:59

## 2022-03-13 NOTE — PROGRESS NOTES
Pharmacokinetics Service Note:    Ms. Arzate has been evaluated for vancomycin dosing to treat intra-abdominal infection and pyelonephritis. 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 Will load the patient with a one time dose of 2000 mg. Based on current demographics and excellent renal function, will dose at 1500 mg Q12H to target AUC of 400-600 mg/L.hr. Will check a level within 24-48 hours to determine if any adjustments are needed. Pharmacy will continue to follow.    Thank you,  Patria Larsen, PharmD  03/13/22  12:34 EDT

## 2022-03-13 NOTE — ED PROVIDER NOTES
Subjective   Patient is 36-year-old female with an extensive past medical history, states that at the end of December into January she was hospitalized at [facility], was on the ventilator, had an abscess in her left abdomen and had to have a stent placed in her left ureter.  She now presents with a 2-day history of worsening pain in her left flank and left lower abdomen, has had some nausea and vomiting.  She reports chills but not rigors.  No known fever, diaphoresis, chest pain, shortness of breath, other symptoms or other complaints.          Review of Systems   Constitutional: Positive for chills. Negative for diaphoresis and fever.   HENT: Negative for ear pain, sore throat and trouble swallowing.    Eyes: Negative for photophobia and pain.   Respiratory: Negative for shortness of breath, wheezing and stridor.    Cardiovascular: Negative for chest pain and palpitations.   Gastrointestinal: Positive for abdominal pain, nausea and vomiting. Negative for abdominal distention, blood in stool and diarrhea.   Endocrine: Negative for polydipsia and polyphagia.   Genitourinary: Positive for flank pain.   Musculoskeletal: Negative for back pain, neck pain and neck stiffness.   Skin: Negative for color change and pallor.   Neurological: Negative for seizures, syncope and speech difficulty.   Psychiatric/Behavioral: Negative for confusion.   All other systems reviewed and are negative.      Past Medical History:   Diagnosis Date   • A-fib (HCC)    • Abnormal ECG    • Anemia    • Anxiety    • Asthma    • Cancer (HCC)     Ovarian   • Depression    • Diabetes mellitus (HCC)    • DVT (deep venous thrombosis) (HCC)    • Factor 5 Leiden mutation, heterozygous (HCC)    • Fibroid    • GERD (gastroesophageal reflux disease)    • Gout    • Hyperlipidemia    • Hypothyroid    • Kidney stone    • Migraines    • Neuropathy    • Ovarian cancer (HCC) Feb 2021   • Ovarian cyst    • PE (pulmonary embolism)    • Polycystic  ovary syndrome    • Preeclampsia    • Rh incompatibility    • Stroke (HCC)    • TIA (transient ischemic attack)    • Urinary tract infection    • Varicella        Allergies   Allergen Reactions   • Toradol [Ketorolac Tromethamine] Anaphylaxis and Hives   • Haldol [Haloperidol] Hives and Mental Status Change   • Tramadol Hives and Swelling   • Amoxicillin Hives and Rash   • Penicillins Hives and Rash       Past Surgical History:   Procedure Laterality Date   • ABDOMINAL SURGERY     • CARDIAC CATHETERIZATION     •  SECTION     • CHOLECYSTECTOMY     • COLONOSCOPY     • ENDOSCOPY     • EXTRACORPOREAL SHOCK WAVE LITHOTRIPSY (ESWL) Left 10/22/2021    Procedure: EXTRACORPOREAL SHOCKWAVE LITHOTRIPSY;  Surgeon: Milan Motley MD;  Location: [facility];  Service: Urology;  Laterality: Left;   • LITHOTRIPSY     • RIGHT OOPHORECTOMY     • URETEROSCOPY LASER LITHOTRIPSY WITH STENT INSERTION Left 10/1/2021    Procedure: URETEROSCOPY WITH STENT PLACEMENT;  Surgeon: Milan Motley MD;  Location: [facility];  Service: Urology;  Laterality: Left;       Family History   Problem Relation Age of Onset   • Hypertension Mother    • Diabetes Father    • Hypertension Father    • Heart attack Father    • No Known Problems Sister    • No Known Problems Brother    • No Known Problems Son    • No Known Problems Daughter    • No Known Problems Maternal Grandmother    • No Known Problems Maternal Grandfather    • No Known Problems Paternal Grandmother    • No Known Problems Paternal Grandfather    • No Known Problems Cousin    • Rheum arthritis Neg Hx    • Osteoarthritis Neg Hx    • Asthma Neg Hx    • Heart failure Neg Hx    • Hyperlipidemia Neg Hx    • Migraines Neg Hx    • Rashes / Skin problems Neg Hx    • Seizures Neg Hx    • Stroke Neg Hx    • Thyroid disease Neg Hx        Social History     Socioeconomic History   • Marital status:    Tobacco Use   • Smoking status: Never Smoker   • Smokeless tobacco: Never  "Used   Vaping Use   • Vaping Use: Never used   Substance and Sexual Activity   • Alcohol use: No   • Drug use: Never     Comment: Pt has track marks on right arm. Pt states \"It's from my INR being drawn.\"    • Sexual activity: Not Currently     Partners: Male     Birth control/protection: None           Objective   Physical Exam  Vitals and nursing note reviewed.   Constitutional:       General: She is not in acute distress.     Appearance: Normal appearance. She is obese. She is not toxic-appearing or diaphoretic.      Comments: Chronically ill-appearing female, alert and well-oriented.  She appears uncomfortable.   HENT:      Head: Normocephalic and atraumatic.   Eyes:      General: No scleral icterus.     Pupils: Pupils are equal, round, and reactive to light.   Neck:      Trachea: No tracheal deviation.   Cardiovascular:      Rate and Rhythm: Normal rate and regular rhythm.   Pulmonary:      Effort: Pulmonary effort is normal. No respiratory distress.      Breath sounds: Normal breath sounds.   Chest:      Chest wall: No tenderness.   Abdominal:      General: Bowel sounds are normal.      Palpations: Abdomen is soft.      Tenderness: There is left CVA tenderness (Moderate). There is no right CVA tenderness, guarding or rebound.      Comments: Tenderness to palpation in the left flank, left midabdomen and left lower quadrant.  No acute peritoneal signs.   Musculoskeletal:         General: No tenderness. Normal range of motion.      Cervical back: Normal range of motion and neck supple. No rigidity or tenderness.      Right lower leg: No edema.      Left lower leg: No edema.   Skin:     General: Skin is warm and dry.      Capillary Refill: Capillary refill takes less than 2 seconds.      Coloration: Skin is not pale.   Neurological:      General: No focal deficit present.      Mental Status: She is alert and oriented to person, place, and time.      GCS: GCS eye subscore is 4. GCS verbal subscore is 5. GCS motor " subscore is 6.      Motor: No abnormal muscle tone.   Psychiatric:         Mood and Affect: Mood normal.         Behavior: Behavior normal.         Procedures  XR Chest 1 View   Final Result   No acute cardiopulmonary findings.      Signer Name: MARIA M CHARLES MD    Signed: 3/13/2022 11:22 AM    Workstation Name: [facility]     Radiology [facility]      CT Abdomen Pelvis With Contrast   Final Result      1.  Left-sided intra-abdominal abscess slightly increased in size from prior. Drain extends into the collection. Extension of soft tissue thickening to involve the left abdominal wall musculature, possibly a combination of phlegmonous change or   developing abscess. Component of hematoma not excluded.   2.   Striated left nephrogram with poor enhancement of the left kidney. Marked inflammatory changes adjacent to the left kidney, favored worsened. Findings suspicious for pyelonephritis.   3.  Hepatomegaly. Splenomegaly.            Signer Name: MARIA M CHARLES MD    Signed: 3/13/2022 10:55 AM    Workstation Name: [facility]     Radiology [facility]        Results for orders placed or performed during the hospital encounter of 03/13/22   Blood Culture - Blood, Arm, Left    Specimen: Arm, Left; Blood   Result Value Ref Range    Blood Culture No growth at 24 hours    Blood Culture - Blood, Hand, Left    Specimen: Hand, Left; Blood   Result Value Ref Range    Blood Culture No growth at 24 hours    Urine Culture - Urine, Urine, Catheter    Specimen: Urine, Catheter   Result Value Ref Range    Urine Culture 25,000 CFU/mL Normal Urogenital Margo    Wound Culture - Wound, Buttock    Specimen: Buttock; Wound   Result Value Ref Range    Wound Culture No growth     Gram Stain Rare (1+) WBCs seen     Gram Stain No organisms seen    Comprehensive Metabolic Panel    Specimen: Blood   Result Value Ref Range    Glucose 306 (H) 65 - 99 mg/dL    BUN 25 (H) 6 - 20 mg/dL    Creatinine 0.80 0.57 - 1.00 mg/dL     Sodium 132 (L) 136 - 145 mmol/L    Potassium 4.2 3.5 - 5.2 mmol/L    Chloride 97 (L) 98 - 107 mmol/L    CO2 20.6 (L) 22.0 - 29.0 mmol/L    Calcium 9.4 8.6 - 10.5 mg/dL    Total Protein 8.0 6.0 - 8.5 g/dL    Albumin 3.86 3.50 - 5.20 g/dL    ALT (SGPT) 12 1 - 33 U/L    AST (SGOT) 14 1 - 32 U/L    Alkaline Phosphatase 127 (H) 39 - 117 U/L    Total Bilirubin 0.3 0.0 - 1.2 mg/dL    Globulin 4.1 gm/dL    A/G Ratio 0.9 g/dL    BUN/Creatinine Ratio 31.3 (H) 7.0 - 25.0    Anion Gap 14.4 5.0 - 15.0 mmol/L    eGFR 98.7 >60.0 mL/min/1.73   Lactic Acid, Plasma    Specimen: Blood   Result Value Ref Range    Lactate 3.4 (C) 0.5 - 2.0 mmol/L   Urinalysis With Culture If Indicated - Urine, Catheter    Specimen: Urine, Catheter   Result Value Ref Range    Color, UA Yellow Yellow, Straw    Appearance, UA Clear Clear    pH, UA <=5.0 5.0 - 8.0    Specific Gravity, UA 1.020 1.005 - 1.030    Glucose,  mg/dL (2+) (A) Negative    Ketones, UA Negative Negative    Bilirubin, UA Negative Negative    Blood, UA Trace (A) Negative    Protein, UA 30 mg/dL (1+) (A) Negative    Leuk Esterase, UA Moderate (2+) (A) Negative    Nitrite, UA Negative Negative    Urobilinogen, UA 0.2 E.U./dL 0.2 - 1.0 E.U./dL   Calcium, Ionized    Specimen: Blood   Result Value Ref Range    Ionized Calcium 1.24 1.12 - 1.32 mmol/L   CBC Auto Differential    Specimen: Blood   Result Value Ref Range    WBC 6.49 3.40 - 10.80 10*3/mm3    RBC 3.25 (L) 3.77 - 5.28 10*6/mm3    Hemoglobin 10.3 (L) 12.0 - 15.9 g/dL    Hematocrit 30.3 (L) 34.0 - 46.6 %    MCV 93.2 79.0 - 97.0 fL    MCH 31.7 26.6 - 33.0 pg    MCHC 34.0 31.5 - 35.7 g/dL    RDW 14.1 12.3 - 15.4 %    RDW-SD 46.6 37.0 - 54.0 fl    MPV 8.2 6.0 - 12.0 fL    Platelets 311 140 - 450 10*3/mm3    Neutrophil % 60.3 42.7 - 76.0 %    Lymphocyte % 29.1 19.6 - 45.3 %    Monocyte % 7.1 5.0 - 12.0 %    Eosinophil % 2.6 0.3 - 6.2 %    Basophil % 0.6 0.0 - 1.5 %    Immature Grans % 0.3 0.0 - 0.5 %    Neutrophils, Absolute 3.91  1.70 - 7.00 10*3/mm3    Lymphocytes, Absolute 1.89 0.70 - 3.10 10*3/mm3    Monocytes, Absolute 0.46 0.10 - 0.90 10*3/mm3    Eosinophils, Absolute 0.17 0.00 - 0.40 10*3/mm3    Basophils, Absolute 0.04 0.00 - 0.20 10*3/mm3    Immature Grans, Absolute 0.02 0.00 - 0.05 10*3/mm3    nRBC 0.0 0.0 - 0.2 /100 WBC   Lipase    Specimen: Blood   Result Value Ref Range    Lipase 52 13 - 60 U/L   Urinalysis, Microscopic Only - Urine, Catheter    Specimen: Urine, Catheter   Result Value Ref Range    RBC, UA 0-2 None Seen, 0-2 /HPF    WBC, UA 21-30 (A) None Seen, 0-2 /HPF    Bacteria, UA 2+ (A) None Seen /HPF    Squamous Epithelial Cells, UA 0-2 None Seen, 0-2 /HPF    Yeast, UA Moderate/2+ Budding Yeast None Seen /HPF    Hyaline Casts, UA None Seen None Seen /LPF    Methodology Manual Light Microscopy    STAT Lactic Acid, Reflex    Specimen: Arm, Right; Blood   Result Value Ref Range    Lactate 2.4 (C) 0.5 - 2.0 mmol/L   Troponin    Specimen: Blood   Result Value Ref Range    Troponin T <0.010 0.000 - 0.030 ng/mL   C-reactive Protein    Specimen: Blood   Result Value Ref Range    C-Reactive Protein 1.60 (H) 0.00 - 0.50 mg/dL   TSH    Specimen: Blood   Result Value Ref Range    TSH 3.780 0.270 - 4.200 uIU/mL   Magnesium    Specimen: Blood   Result Value Ref Range    Magnesium 1.5 (L) 1.6 - 2.6 mg/dL   Procalcitonin    Specimen: Blood   Result Value Ref Range    Procalcitonin 0.19 0.00 - 0.25 ng/mL   CBC (No Diff)    Specimen: Blood   Result Value Ref Range    WBC 5.19 3.40 - 10.80 10*3/mm3    RBC 2.85 (L) 3.77 - 5.28 10*6/mm3    Hemoglobin 9.3 (L) 12.0 - 15.9 g/dL    Hematocrit 27.0 (L) 34.0 - 46.6 %    MCV 94.7 79.0 - 97.0 fL    MCH 32.6 26.6 - 33.0 pg    MCHC 34.4 31.5 - 35.7 g/dL    RDW 14.4 12.3 - 15.4 %    RDW-SD 48.3 37.0 - 54.0 fl    MPV 8.0 6.0 - 12.0 fL    Platelets 229 140 - 450 10*3/mm3   Comprehensive Metabolic Panel    Specimen: Blood   Result Value Ref Range    Glucose 110 (H) 65 - 99 mg/dL    BUN 20 6 - 20 mg/dL     Creatinine 0.73 0.57 - 1.00 mg/dL    Sodium 137 136 - 145 mmol/L    Potassium 3.8 3.5 - 5.2 mmol/L    Chloride 105 98 - 107 mmol/L    CO2 19.0 (L) 22.0 - 29.0 mmol/L    Calcium 9.2 8.6 - 10.5 mg/dL    Total Protein 7.4 6.0 - 8.5 g/dL    Albumin 3.37 (L) 3.50 - 5.20 g/dL    ALT (SGPT) 13 1 - 33 U/L    AST (SGOT) 17 1 - 32 U/L    Alkaline Phosphatase 122 (H) 39 - 117 U/L    Total Bilirubin 0.3 0.0 - 1.2 mg/dL    Globulin 4.0 gm/dL    A/G Ratio 0.8 g/dL    BUN/Creatinine Ratio 27.4 (H) 7.0 - 25.0    Anion Gap 13.0 5.0 - 15.0 mmol/L    eGFR 110.1 >60.0 mL/min/1.73   Lactic Acid, Plasma    Specimen: Blood   Result Value Ref Range    Lactate 0.7 0.5 - 2.0 mmol/L   C-reactive Protein    Specimen: Blood   Result Value Ref Range    C-Reactive Protein 2.17 (H) 0.00 - 0.50 mg/dL   ECG 12 Lead   Result Value Ref Range    QT Interval 354 ms    QTC Interval 470 ms   Green Top (Gel)   Result Value Ref Range    Extra Tube Hold for add-ons.    Lavender Top   Result Value Ref Range    Extra Tube hold for add-on    Light Blue Top   Result Value Ref Range    Extra Tube hold for add-on                 ED Course  ED Course as of 03/15/22 0103   Sun Mar 13, 2022   1132 EKG at 1022 showed sinus tachycardia, rate 106.  OK interval 176, QRS duration 94, QTc 470 ms.  Nonspecific T wave abnormality.  No evidence for STEMI. [CM]   1141 We called  for possible transfer.  They wanted the images powershared to them.  They will review the images and call us back. [CM]   1226 I discussed the case with transfer physician Dr. Pabon, [place] [facility] Dr. Calhoun, they accept the patient in transfer though there is not currently a bed available.  They expect to have a surgical bed available later today.  Dr. Pabon also had urology reviewed the patient's CT, and they advised that there was no acute surgical emergency from a urological perspective.  Per  surgery's instruction, surgical drain flushed with 30 cc saline, with much improved flow after that.   Patient is agreeable with staying here in the emergency department with us pending bed availability at . [CM]   1907 Patient's afternoon has been uneventful.  She has been much more comfortable.  We are still awaiting bed availability at .  Case discussed with and care endorsed to Dr. Reddy at shift change.  Electronically signed by Esvin Narayanan MD, 03/13/22, 7:08 PM EDT.   [CM]   Mon Mar 14, 2022   0739 36-year-old female with known prior left-sided intra-abdominal abscess here for recurrent/worsening pain, found septic, CT noting abscess enlargement and possible new abdominal muscular wall abscess and pyelonephritis.  On vancomycin and ceftriaxone metronidazole. Received 1L NS bolus.  Patient reassessed.  Patient has complaints of moderate abdominal pain on the left side.  Pain has improved since arrival though mildly worse this morning.  She accidentally partially pulled out her drain from her left abdomen by roughly 2 inches while moving in an attempt to urinate.  Nursing to secure.  Awaiting bed at . AM labs ordered. [KP]   0756 Reportedly patient also has sacral decubitus ulceration appearing noninfectious. [KP]   0757 Wound Culture: No growth [KP]   0922 WBC: 5.19 [KP]   0922 Lactate: 0.7 [KP]   0923 C-Reactive Protein(!): 2.17 [KP]   0923 Creatinine: 0.73 [KP]   2235 I assumed care of this patient at shift change.  Patient had been receiving our only Dilaudid 0.5 mg IV x1 regularly for the last 24 to 36 hours.  Concerns for high risk pain medication.  Patient was de-escalate to morphine 4 mg IV every 4 hours as needed [SF]   Tue Mar 15, 2022   0101 Patient began to note to staff and myself that she was tired of sitting in the hospital bed.  Patient informed the staff that they wished to leave against medical advice.  The risks of this decision were discussed throughly with the patient.  The risks included, but were not limited to worsening medical status, sepsis, shock, respiratory  failure, severe neurological deficit, and cardiac death.  The patient expressed full understanding of the risk of this decision.  Patient was counseled to immediately return to the ER if symptoms worsened, and to follow up with their PCP promptly.  Vitals guarded at AMA. [SF]      ED Course User Index  [CM] Esvin Narayanan MD  [KP] Spencer Murray MD  [SF] David Mccoy,                                                  [facility]    Final diagnoses:   Intra-abdominal abscess (HCC)   Pyelonephritis   Sepsis, due to unspecified organism, unspecified whether acute organ dysfunction present (HCC)       ED Disposition  ED Disposition     ED Disposition   [place]    Condition   --    Comment   --                      David Mccoy DO  03/15/22 0103     Patient/Caregiver provided printed discharge information.

## 2022-03-14 LAB
ALBUMIN SERPL-MCNC: 3.37 G/DL (ref 3.5–5.2)
ALBUMIN/GLOB SERPL: 0.8 G/DL
ALP SERPL-CCNC: 122 U/L (ref 39–117)
ALT SERPL W P-5'-P-CCNC: 13 U/L (ref 1–33)
ANION GAP SERPL CALCULATED.3IONS-SCNC: 13 MMOL/L (ref 5–15)
AST SERPL-CCNC: 17 U/L (ref 1–32)
BACTERIA SPEC AEROBE CULT: NORMAL
BILIRUB SERPL-MCNC: 0.3 MG/DL (ref 0–1.2)
BUN SERPL-MCNC: 20 MG/DL (ref 6–20)
BUN/CREAT SERPL: 27.4 (ref 7–25)
CALCIUM SPEC-SCNC: 9.2 MG/DL (ref 8.6–10.5)
CHLORIDE SERPL-SCNC: 105 MMOL/L (ref 98–107)
CO2 SERPL-SCNC: 19 MMOL/L (ref 22–29)
CREAT SERPL-MCNC: 0.73 MG/DL (ref 0.57–1)
CRP SERPL-MCNC: 2.17 MG/DL (ref 0–0.5)
D-LACTATE SERPL-SCNC: 0.7 MMOL/L (ref 0.5–2)
DEPRECATED RDW RBC AUTO: 48.3 FL (ref 37–54)
EGFRCR SERPLBLD CKD-EPI 2021: 110.1 ML/MIN/1.73
ERYTHROCYTE [DISTWIDTH] IN BLOOD BY AUTOMATED COUNT: 14.4 % (ref 12.3–15.4)
GLOBULIN UR ELPH-MCNC: 4 GM/DL
GLUCOSE SERPL-MCNC: 110 MG/DL (ref 65–99)
HCT VFR BLD AUTO: 27 % (ref 34–46.6)
HGB BLD-MCNC: 9.3 G/DL (ref 12–15.9)
MCH RBC QN AUTO: 32.6 PG (ref 26.6–33)
MCHC RBC AUTO-ENTMCNC: 34.4 G/DL (ref 31.5–35.7)
MCV RBC AUTO: 94.7 FL (ref 79–97)
PLATELET # BLD AUTO: 229 10*3/MM3 (ref 140–450)
PMV BLD AUTO: 8 FL (ref 6–12)
POTASSIUM SERPL-SCNC: 3.8 MMOL/L (ref 3.5–5.2)
PROT SERPL-MCNC: 7.4 G/DL (ref 6–8.5)
RBC # BLD AUTO: 2.85 10*6/MM3 (ref 3.77–5.28)
SODIUM SERPL-SCNC: 137 MMOL/L (ref 136–145)
WBC NRBC COR # BLD: 5.19 10*3/MM3 (ref 3.4–10.8)

## 2022-03-14 PROCEDURE — 25010000002 PROCHLORPERAZINE 10 MG/2ML SOLUTION: Performed by: EMERGENCY MEDICINE

## 2022-03-14 PROCEDURE — 96361 HYDRATE IV INFUSION ADD-ON: CPT

## 2022-03-14 PROCEDURE — 96366 THER/PROPH/DIAG IV INF ADDON: CPT

## 2022-03-14 PROCEDURE — 80053 COMPREHEN METABOLIC PANEL: CPT | Performed by: STUDENT IN AN ORGANIZED HEALTH CARE EDUCATION/TRAINING PROGRAM

## 2022-03-14 PROCEDURE — 86140 C-REACTIVE PROTEIN: CPT | Performed by: STUDENT IN AN ORGANIZED HEALTH CARE EDUCATION/TRAINING PROGRAM

## 2022-03-14 PROCEDURE — 85027 COMPLETE CBC AUTOMATED: CPT | Performed by: STUDENT IN AN ORGANIZED HEALTH CARE EDUCATION/TRAINING PROGRAM

## 2022-03-14 PROCEDURE — 25010000002 MORPHINE PER 10 MG: Performed by: STUDENT IN AN ORGANIZED HEALTH CARE EDUCATION/TRAINING PROGRAM

## 2022-03-14 PROCEDURE — 96375 TX/PRO/DX INJ NEW DRUG ADDON: CPT

## 2022-03-14 PROCEDURE — 96376 TX/PRO/DX INJ SAME DRUG ADON: CPT

## 2022-03-14 PROCEDURE — 25010000002 HYDROMORPHONE 1 MG/ML SOLUTION: Performed by: EMERGENCY MEDICINE

## 2022-03-14 PROCEDURE — 25010000002 VANCOMYCIN 5 G RECONSTITUTED SOLUTION: Performed by: EMERGENCY MEDICINE

## 2022-03-14 PROCEDURE — 83605 ASSAY OF LACTIC ACID: CPT | Performed by: STUDENT IN AN ORGANIZED HEALTH CARE EDUCATION/TRAINING PROGRAM

## 2022-03-14 PROCEDURE — 25010000002 HYDROMORPHONE PER 4 MG: Performed by: EMERGENCY MEDICINE

## 2022-03-14 PROCEDURE — 25010000002 CEFTRIAXONE PER 250 MG: Performed by: EMERGENCY MEDICINE

## 2022-03-14 RX ORDER — GABAPENTIN 300 MG/1
300 CAPSULE ORAL 2 TIMES DAILY
Status: CANCELLED | OUTPATIENT
Start: 2022-03-14 | End: 2022-03-17

## 2022-03-14 RX ORDER — METOPROLOL SUCCINATE 25 MG/1
25 TABLET, EXTENDED RELEASE ORAL NIGHTLY
COMMUNITY
End: 2022-04-20 | Stop reason: SDUPTHER

## 2022-03-14 RX ORDER — METOPROLOL SUCCINATE 25 MG/1
25 TABLET, EXTENDED RELEASE ORAL NIGHTLY
Status: CANCELLED | OUTPATIENT
Start: 2022-03-14

## 2022-03-14 RX ORDER — DULOXETIN HYDROCHLORIDE 20 MG/1
20 CAPSULE, DELAYED RELEASE ORAL DAILY
Status: CANCELLED | OUTPATIENT
Start: 2022-03-14 | End: 2022-03-18

## 2022-03-14 RX ORDER — AMLODIPINE BESYLATE 5 MG/1
10 TABLET ORAL DAILY
Status: CANCELLED | OUTPATIENT
Start: 2022-03-14 | End: 2023-02-16

## 2022-03-14 RX ADMIN — METRONIDAZOLE 500 MG: 500 INJECTION, SOLUTION INTRAVENOUS at 04:47

## 2022-03-14 RX ADMIN — VANCOMYCIN HYDROCHLORIDE 1500 MG: 10 INJECTION, POWDER, LYOPHILIZED, FOR SOLUTION INTRAVENOUS at 02:01

## 2022-03-14 RX ADMIN — HYDROMORPHONE HYDROCHLORIDE 0.5 MG: 1 INJECTION, SOLUTION INTRAMUSCULAR; INTRAVENOUS; SUBCUTANEOUS at 09:17

## 2022-03-14 RX ADMIN — VANCOMYCIN HYDROCHLORIDE 1500 MG: 10 INJECTION, POWDER, LYOPHILIZED, FOR SOLUTION INTRAVENOUS at 13:40

## 2022-03-14 RX ADMIN — HYDROMORPHONE HYDROCHLORIDE 0.5 MG: 1 INJECTION, SOLUTION INTRAMUSCULAR; INTRAVENOUS; SUBCUTANEOUS at 05:23

## 2022-03-14 RX ADMIN — PROCHLORPERAZINE EDISYLATE 5 MG: 5 INJECTION INTRAMUSCULAR; INTRAVENOUS at 22:23

## 2022-03-14 RX ADMIN — HYDROMORPHONE HYDROCHLORIDE 0.5 MG: 1 INJECTION, SOLUTION INTRAMUSCULAR; INTRAVENOUS; SUBCUTANEOUS at 16:03

## 2022-03-14 RX ADMIN — SODIUM CHLORIDE 125 ML/HR: 9 INJECTION, SOLUTION INTRAVENOUS at 04:07

## 2022-03-14 RX ADMIN — METRONIDAZOLE 500 MG: 500 INJECTION, SOLUTION INTRAVENOUS at 16:03

## 2022-03-14 RX ADMIN — CEFTRIAXONE 1 G: 10 INJECTION, POWDER, FOR SOLUTION INTRAVENOUS at 10:29

## 2022-03-14 RX ADMIN — HYDROMORPHONE HYDROCHLORIDE 0.5 MG: 1 INJECTION, SOLUTION INTRAMUSCULAR; INTRAVENOUS; SUBCUTANEOUS at 13:42

## 2022-03-14 RX ADMIN — MORPHINE SULFATE 4 MG: 4 INJECTION, SOLUTION INTRAMUSCULAR; INTRAVENOUS at 22:53

## 2022-03-14 RX ADMIN — HYDROMORPHONE HYDROCHLORIDE 0.5 MG: 1 INJECTION, SOLUTION INTRAMUSCULAR; INTRAVENOUS; SUBCUTANEOUS at 10:30

## 2022-03-14 RX ADMIN — HYDROMORPHONE HYDROCHLORIDE 0.5 MG: 1 INJECTION, SOLUTION INTRAMUSCULAR; INTRAVENOUS; SUBCUTANEOUS at 01:13

## 2022-03-14 RX ADMIN — HYDROMORPHONE HYDROCHLORIDE 0.5 MG: 1 INJECTION, SOLUTION INTRAMUSCULAR; INTRAVENOUS; SUBCUTANEOUS at 07:58

## 2022-03-14 RX ADMIN — HYDROMORPHONE HYDROCHLORIDE 0.5 MG: 1 INJECTION, SOLUTION INTRAMUSCULAR; INTRAVENOUS; SUBCUTANEOUS at 21:03

## 2022-03-14 RX ADMIN — HYDROMORPHONE HYDROCHLORIDE 0.5 MG: 1 INJECTION, SOLUTION INTRAMUSCULAR; INTRAVENOUS; SUBCUTANEOUS at 17:34

## 2022-03-14 RX ADMIN — METRONIDAZOLE 500 MG: 500 INJECTION, SOLUTION INTRAVENOUS at 20:13

## 2022-03-14 RX ADMIN — HYDROMORPHONE HYDROCHLORIDE 0.5 MG: 1 INJECTION, SOLUTION INTRAMUSCULAR; INTRAVENOUS; SUBCUTANEOUS at 19:35

## 2022-03-14 RX ADMIN — HYDROMORPHONE HYDROCHLORIDE 0.5 MG: 1 INJECTION, SOLUTION INTRAMUSCULAR; INTRAVENOUS; SUBCUTANEOUS at 12:26

## 2022-03-14 RX ADMIN — HYDROMORPHONE HYDROCHLORIDE 0.5 MG: 1 INJECTION, SOLUTION INTRAMUSCULAR; INTRAVENOUS; SUBCUTANEOUS at 03:18

## 2022-03-14 NOTE — ED NOTES
Patient assisted on bedpan. Call bell at bs. Instructed to call when finished. Patient speech remain clear and she is non drowsy.

## 2022-03-14 NOTE — ED NOTES
"Sleeping in between hourly ordered PRN dilaudid with no acute distress noted and VSS. Patient is waking up hourly to ask for dilaudid. Remains drowsy with slurred speech stating \"My side hurts really bad\" then noted to dose back to sleep with head dropping to side. Concern for over medication with Dilaudid. Dr. Herrera informed. MD states to use discretion when medicating and continue to watch for signs of over medication. Monitoring VS. Cardiac monitor in place.     "

## 2022-03-15 ENCOUNTER — OFFICE VISIT (OUTPATIENT)
Dept: UROLOGY | Facility: CLINIC | Age: 37
End: 2022-03-15

## 2022-03-15 VITALS — BODY MASS INDEX: 37.56 KG/M2 | WEIGHT: 220.02 LBS | HEIGHT: 64 IN

## 2022-03-15 VITALS
RESPIRATION RATE: 22 BRPM | SYSTOLIC BLOOD PRESSURE: 137 MMHG | DIASTOLIC BLOOD PRESSURE: 84 MMHG | BODY MASS INDEX: 37.56 KG/M2 | HEART RATE: 86 BPM | WEIGHT: 220 LBS | HEIGHT: 64 IN | TEMPERATURE: 98.7 F | OXYGEN SATURATION: 97 %

## 2022-03-15 DIAGNOSIS — N20.0 RENAL CALCULUS, LEFT: Primary | ICD-10-CM

## 2022-03-15 LAB
BACTERIA SPEC AEROBE CULT: NORMAL
GRAM STN SPEC: NORMAL
GRAM STN SPEC: NORMAL

## 2022-03-15 PROCEDURE — 96366 THER/PROPH/DIAG IV INF ADDON: CPT

## 2022-03-15 PROCEDURE — 25010000002 VANCOMYCIN: Performed by: EMERGENCY MEDICINE

## 2022-03-15 PROCEDURE — 99213 OFFICE O/P EST LOW 20 MIN: CPT | Performed by: UROLOGY

## 2022-03-15 RX ORDER — PROMETHAZINE HYDROCHLORIDE 25 MG/1
25 TABLET ORAL EVERY 6 HOURS PRN
Qty: 21 TABLET | Refills: 2 | Status: SHIPPED | OUTPATIENT
Start: 2022-03-15 | End: 2022-07-07 | Stop reason: SDUPTHER

## 2022-03-15 RX ORDER — OXYCODONE AND ACETAMINOPHEN 10; 325 MG/1; MG/1
1 TABLET ORAL EVERY 6 HOURS PRN
Qty: 20 TABLET | Refills: 0 | Status: SHIPPED | OUTPATIENT
Start: 2022-03-15 | End: 2022-03-21

## 2022-03-15 RX ADMIN — VANCOMYCIN HYDROCHLORIDE 1500 MG: 10 INJECTION, POWDER, LYOPHILIZED, FOR SOLUTION INTRAVENOUS at 00:35

## 2022-03-15 NOTE — PROGRESS NOTES
Chief Complaint:          Renal calculus    HPI:   36 y.o. female returns today.  She has a left stent position after successful lithotripsy and developed a GI abscess she apparently was transferred to [facility] for prolonged stay.  She has been on doxycycline she was then transferred to [place] she has been on Eliquis she has an IVC filter.  I am need to set her up for ureteroscopy and stent removal      Past Medical History:        Past Medical History:   Diagnosis Date   • A-fib (HCC)    • Abnormal ECG    • Anemia    • Anxiety    • Asthma    • Cancer (HCC)     Ovarian   • Depression    • Diabetes mellitus (HCC)    • DVT (deep venous thrombosis) (HCC)    • Factor 5 Leiden mutation, heterozygous (HCC)    • Fibroid    • GERD (gastroesophageal reflux disease)    • Gout    • Hyperlipidemia    • Hypothyroid    • Kidney stone    • Migraines    • Neuropathy    • Ovarian cancer (HCC) Feb 2021   • Ovarian cyst    • PE (pulmonary embolism)    • Polycystic ovary syndrome    • Preeclampsia    • Rh incompatibility    • Stroke (HCC)    • TIA (transient ischemic attack)    • Urinary tract infection    • Varicella          Current Meds:     Current Outpatient Medications   Medication Sig Dispense Refill   • amLODIPine (NORVASC) 10 MG tablet Take 10 mg by mouth Daily.     • apixaban (ELIQUIS) 5 MG tablet tablet Take 5 mg by mouth.     • DULoxetine (CYMBALTA) 20 MG capsule Take 20 mg by mouth Daily.     • gabapentin (NEURONTIN) 300 MG capsule Take 300 mg by mouth 2 (Two) Times a Day.     • insulin glargine (LANTUS, SEMGLEE) 100 UNIT/ML injection Inject 25 Units under the skin into the appropriate area as directed Daily.     • metoprolol succinate XL (TOPROL-XL) 25 MG 24 hr tablet Take 25 mg by mouth Every Night.       No current facility-administered medications for this visit.        Allergies:      Allergies   Allergen Reactions   • Toradol [Ketorolac Tromethamine] Anaphylaxis and Hives   • Haldol [Haloperidol] Hives  "and Mental Status Change   • Tramadol Hives and Swelling   • Amoxicillin Hives and Rash   • Penicillins Hives and Rash        Past Surgical History:     Past Surgical History:   Procedure Laterality Date   • ABDOMINAL SURGERY     • CARDIAC CATHETERIZATION     •  SECTION     • CHOLECYSTECTOMY     • COLONOSCOPY     • ENDOSCOPY     • EXTRACORPOREAL SHOCK WAVE LITHOTRIPSY (ESWL) Left 10/22/2021    Procedure: EXTRACORPOREAL SHOCKWAVE LITHOTRIPSY;  Surgeon: Milan Motley MD;  Location: [facility];  Service: Urology;  Laterality: Left;   • LITHOTRIPSY     • RIGHT OOPHORECTOMY     • URETEROSCOPY LASER LITHOTRIPSY WITH STENT INSERTION Left 10/1/2021    Procedure: URETEROSCOPY WITH STENT PLACEMENT;  Surgeon: Milan Motley MD;  Location: [facility];  Service: Urology;  Laterality: Left;         Social History:     Social History     Socioeconomic History   • Marital status:    Tobacco Use   • Smoking status: Never Smoker   • Smokeless tobacco: Never Used   Vaping Use   • Vaping Use: Never used   Substance and Sexual Activity   • Alcohol use: No   • Drug use: Never     Comment: Pt has track marks on right arm. Pt states \"It's from my INR being drawn.\"    • Sexual activity: Not Currently     Partners: Male     Birth control/protection: None       Family History:     Family History   Problem Relation Age of Onset   • Hypertension Mother    • Diabetes Father    • Hypertension Father    • Heart attack Father    • No Known Problems Sister    • No Known Problems Brother    • No Known Problems Son    • No Known Problems Daughter    • No Known Problems Maternal Grandmother    • No Known Problems Maternal Grandfather    • No Known Problems Paternal Grandmother    • No Known Problems Paternal Grandfather    • No Known Problems Cousin    • Rheum arthritis Neg Hx    • Osteoarthritis Neg Hx    • Asthma Neg Hx    • Heart failure Neg Hx    • Hyperlipidemia Neg Hx    • Migraines Neg Hx    • Rashes / Skin " problems Neg Hx    • Seizures Neg Hx    • Stroke Neg Hx    • Thyroid disease Neg Hx        Review of Systems:     Review of Systems   Constitutional: Negative.  Negative for activity change, appetite change, chills, diaphoresis, fatigue and unexpected weight change.   HENT: Negative for congestion, dental problem, drooling, ear discharge, ear pain, facial swelling, hearing loss, mouth sores, nosebleeds, postnasal drip, rhinorrhea, sinus pressure, sneezing, sore throat, tinnitus, trouble swallowing and voice change.    Eyes: Negative.  Negative for photophobia, pain, discharge, redness, itching and visual disturbance.   Respiratory: Negative.  Negative for apnea, cough, choking, chest tightness, shortness of breath, wheezing and stridor.    Cardiovascular: Negative.  Negative for chest pain, palpitations and leg swelling.   Gastrointestinal: Negative.  Negative for abdominal distention, abdominal pain, anal bleeding, blood in stool, constipation, diarrhea, nausea, rectal pain and vomiting.   Endocrine: Negative.  Negative for cold intolerance, heat intolerance, polydipsia, polyphagia and polyuria.   Musculoskeletal: Negative.  Negative for arthralgias, back pain, gait problem, joint swelling, myalgias, neck pain and neck stiffness.   Skin: Negative.  Negative for color change, pallor, rash and wound.   Allergic/Immunologic: Negative.  Negative for environmental allergies, food allergies and immunocompromised state.   Neurological: Negative.  Negative for dizziness, tremors, seizures, syncope, facial asymmetry, speech difficulty, weakness, light-headedness, numbness and headaches.   Hematological: Negative.  Negative for adenopathy. Does not bruise/bleed easily.   Psychiatric/Behavioral: Negative for agitation, behavioral problems, confusion, decreased concentration, dysphoric mood, hallucinations, self-injury, sleep disturbance and suicidal ideas. The patient is not nervous/anxious and is not hyperactive.    All  other systems reviewed and are negative.      Physical Exam:     Physical Exam  Constitutional:       Appearance: She is well-developed.   HENT:      Head: Normocephalic and atraumatic.      Right Ear: External ear normal.      Left Ear: External ear normal.   Eyes:      Conjunctiva/sclera: Conjunctivae normal.      Pupils: Pupils are equal, round, and reactive to light.   Cardiovascular:      Rate and Rhythm: Normal rate and regular rhythm.      Heart sounds: Normal heart sounds.   Pulmonary:      Effort: Pulmonary effort is normal.      Breath sounds: Normal breath sounds.   Abdominal:      General: Bowel sounds are normal. There is no distension.      Palpations: Abdomen is soft. There is no mass.      Tenderness: There is no abdominal tenderness. There is no guarding or rebound.   Genitourinary:     Vagina: No vaginal discharge.   Musculoskeletal:         General: Normal range of motion.   Skin:     General: Skin is warm and dry.   Neurological:      Mental Status: She is alert.      Deep Tendon Reflexes: Reflexes are normal and symmetric.   Psychiatric:         Behavior: Behavior normal.         Thought Content: Thought content normal.         Judgment: Judgment normal.         I have reviewed the following portions of the patient's history: Allergies, current medications, past family history, past medical history, past social history, past surgical history, problem list, and ROS and confirm it is accurate.      Procedure:       Assessment/Plan:   Left ureteral calculus status post stenting doing well need to get the stent out with ureteroscopy there is really no stone burden left at this juncture.  Abdominal abscess she has a drain currently being managed by general surgical team              This document has been electronically signed by BRITTANY ELIZONDO MD March 15, 2022 08:03 EDT

## 2022-03-17 DIAGNOSIS — E11.40 TYPE 2 DIABETES MELLITUS WITH DIABETIC NEUROPATHY, WITH LONG-TERM CURRENT USE OF INSULIN: ICD-10-CM

## 2022-03-17 DIAGNOSIS — Z79.4 TYPE 2 DIABETES MELLITUS WITH DIABETIC NEUROPATHY, WITH LONG-TERM CURRENT USE OF INSULIN: ICD-10-CM

## 2022-03-17 DIAGNOSIS — E53.8 VITAMIN B 12 DEFICIENCY: Primary | ICD-10-CM

## 2022-03-17 RX ORDER — GABAPENTIN 300 MG/1
300 CAPSULE ORAL 2 TIMES DAILY
Qty: 60 CAPSULE | Refills: 0 | Status: CANCELLED | OUTPATIENT
Start: 2022-03-17 | End: 2022-04-16

## 2022-03-17 RX ORDER — GABAPENTIN 300 MG/1
300 CAPSULE ORAL 2 TIMES DAILY
Qty: 60 CAPSULE | Refills: 0 | Status: SHIPPED | OUTPATIENT
Start: 2022-03-17 | End: 2022-04-18 | Stop reason: SDUPTHER

## 2022-03-17 NOTE — TELEPHONE ENCOUNTER
Caller: Natalia Arzate    Relationship: Self    Best call back number: [phone]     Requested Prescriptions:   Requested Prescriptions     Pending Prescriptions Disp Refills   • gabapentin (NEURONTIN) 300 MG capsule 60 capsule 0     Sig: Take 1 capsule by mouth 2 (Two) Times a Day for 30 days.        Pharmacy where request should be sent: [address] [phone] [facility] [phone] [facility]     Additional details provided by patient:PATIENT HAS CALLED REQUESTING A REFILL ON ABOVE MEDICATION    Does the patient have less than a 3 day supply:  [x] Yes  [] No    Marvin Davison   03/17/22 08:25 EDT

## 2022-03-18 LAB
BACTERIA SPEC AEROBE CULT: NORMAL
BACTERIA SPEC AEROBE CULT: NORMAL

## 2022-03-20 ENCOUNTER — HOSPITAL ENCOUNTER (EMERGENCY)
Facility: HOSPITAL | Age: 37
Discharge: HOME | End: 2022-03-20
Attending: EMERGENCY MEDICINE | Admitting: EMERGENCY MEDICINE

## 2022-03-20 ENCOUNTER — APPOINTMENT (OUTPATIENT)
Dept: ULTRASOUND IMAGING | Facility: HOSPITAL | Age: 37
End: 2022-03-20

## 2022-03-20 ENCOUNTER — APPOINTMENT (OUTPATIENT)
Dept: CT IMAGING | Facility: HOSPITAL | Age: 37
End: 2022-03-20

## 2022-03-20 VITALS
DIASTOLIC BLOOD PRESSURE: 114 MMHG | RESPIRATION RATE: 18 BRPM | WEIGHT: 230 LBS | HEART RATE: 110 BPM | HEIGHT: 64 IN | TEMPERATURE: 98.2 F | SYSTOLIC BLOOD PRESSURE: 163 MMHG | OXYGEN SATURATION: 97 % | BODY MASS INDEX: 39.27 KG/M2

## 2022-03-20 DIAGNOSIS — N30.01 ACUTE CYSTITIS WITH HEMATURIA: Primary | ICD-10-CM

## 2022-03-20 LAB
ALBUMIN SERPL-MCNC: 3.76 G/DL (ref 3.5–5.2)
ALBUMIN/GLOB SERPL: 0.9 G/DL
ALP SERPL-CCNC: 116 U/L (ref 39–117)
ALT SERPL W P-5'-P-CCNC: 10 U/L (ref 1–33)
AMPHET+METHAMPHET UR QL: NEGATIVE
AMPHETAMINES UR QL: NEGATIVE
ANION GAP SERPL CALCULATED.3IONS-SCNC: 12.9 MMOL/L (ref 5–15)
AST SERPL-CCNC: 13 U/L (ref 1–32)
BACTERIA UR QL AUTO: ABNORMAL /HPF
BARBITURATES UR QL SCN: NEGATIVE
BASOPHILS # BLD AUTO: 0.04 10*3/MM3 (ref 0–0.2)
BASOPHILS NFR BLD AUTO: 0.7 % (ref 0–1.5)
BENZODIAZ UR QL SCN: NEGATIVE
BILIRUB SERPL-MCNC: 0.2 MG/DL (ref 0–1.2)
BILIRUB UR QL STRIP: NEGATIVE
BUN SERPL-MCNC: 15 MG/DL (ref 6–20)
BUN/CREAT SERPL: 19.2 (ref 7–25)
BUPRENORPHINE SERPL-MCNC: NEGATIVE NG/ML
CALCIUM SPEC-SCNC: 9.7 MG/DL (ref 8.6–10.5)
CANNABINOIDS SERPL QL: NEGATIVE
CHLORIDE SERPL-SCNC: 97 MMOL/L (ref 98–107)
CLARITY UR: ABNORMAL
CO2 SERPL-SCNC: 22.1 MMOL/L (ref 22–29)
COCAINE UR QL: NEGATIVE
COLOR UR: ABNORMAL
CREAT SERPL-MCNC: 0.78 MG/DL (ref 0.57–1)
CRP SERPL-MCNC: 0.74 MG/DL (ref 0–0.5)
D-LACTATE SERPL-SCNC: 3.2 MMOL/L (ref 0.5–2)
DEPRECATED RDW RBC AUTO: 47.6 FL (ref 37–54)
EGFRCR SERPLBLD CKD-EPI 2021: 101.7 ML/MIN/1.73
EOSINOPHIL # BLD AUTO: 0.18 10*3/MM3 (ref 0–0.4)
EOSINOPHIL NFR BLD AUTO: 3 % (ref 0.3–6.2)
ERYTHROCYTE [DISTWIDTH] IN BLOOD BY AUTOMATED COUNT: 14.3 % (ref 12.3–15.4)
GLOBULIN UR ELPH-MCNC: 4 GM/DL
GLUCOSE SERPL-MCNC: 251 MG/DL (ref 65–99)
GLUCOSE UR STRIP-MCNC: ABNORMAL MG/DL
HCT VFR BLD AUTO: 31.2 % (ref 34–46.6)
HGB BLD-MCNC: 10.7 G/DL (ref 12–15.9)
HGB UR QL STRIP.AUTO: ABNORMAL
HYALINE CASTS UR QL AUTO: ABNORMAL /LPF
IMM GRANULOCYTES # BLD AUTO: 0.01 10*3/MM3 (ref 0–0.05)
IMM GRANULOCYTES NFR BLD AUTO: 0.2 % (ref 0–0.5)
KETONES UR QL STRIP: NEGATIVE
LEUKOCYTE ESTERASE UR QL STRIP.AUTO: ABNORMAL
LYMPHOCYTES # BLD AUTO: 2.02 10*3/MM3 (ref 0.7–3.1)
LYMPHOCYTES NFR BLD AUTO: 33.4 % (ref 19.6–45.3)
MCH RBC QN AUTO: 31.8 PG (ref 26.6–33)
MCHC RBC AUTO-ENTMCNC: 34.3 G/DL (ref 31.5–35.7)
MCV RBC AUTO: 92.9 FL (ref 79–97)
METHADONE UR QL SCN: NEGATIVE
MONOCYTES # BLD AUTO: 0.53 10*3/MM3 (ref 0.1–0.9)
MONOCYTES NFR BLD AUTO: 8.8 % (ref 5–12)
NEUTROPHILS NFR BLD AUTO: 3.26 10*3/MM3 (ref 1.7–7)
NEUTROPHILS NFR BLD AUTO: 53.9 % (ref 42.7–76)
NITRITE UR QL STRIP: NEGATIVE
NRBC BLD AUTO-RTO: 0 /100 WBC (ref 0–0.2)
OPIATES UR QL: POSITIVE
OXYCODONE UR QL SCN: POSITIVE
PCP UR QL SCN: NEGATIVE
PH UR STRIP.AUTO: 5.5 [PH] (ref 5–8)
PLATELET # BLD AUTO: 306 10*3/MM3 (ref 140–450)
PMV BLD AUTO: 8.5 FL (ref 6–12)
POTASSIUM SERPL-SCNC: 4 MMOL/L (ref 3.5–5.2)
PROPOXYPH UR QL: NEGATIVE
PROT SERPL-MCNC: 7.8 G/DL (ref 6–8.5)
PROT UR QL STRIP: ABNORMAL
RBC # BLD AUTO: 3.36 10*6/MM3 (ref 3.77–5.28)
RBC # UR STRIP: ABNORMAL /HPF
REF LAB TEST METHOD: ABNORMAL
SODIUM SERPL-SCNC: 132 MMOL/L (ref 136–145)
SP GR UR STRIP: 1.02 (ref 1–1.03)
SQUAMOUS #/AREA URNS HPF: ABNORMAL /HPF
TRICYCLICS UR QL SCN: NEGATIVE
UROBILINOGEN UR QL STRIP: ABNORMAL
WBC # UR STRIP: ABNORMAL /HPF
WBC NRBC COR # BLD: 6.04 10*3/MM3 (ref 3.4–10.8)

## 2022-03-20 PROCEDURE — 80306 DRUG TEST PRSMV INSTRMNT: CPT | Performed by: EMERGENCY MEDICINE

## 2022-03-20 PROCEDURE — 96375 TX/PRO/DX INJ NEW DRUG ADDON: CPT

## 2022-03-20 PROCEDURE — 93971 EXTREMITY STUDY: CPT | Performed by: RADIOLOGY

## 2022-03-20 PROCEDURE — 96374 THER/PROPH/DIAG INJ IV PUSH: CPT

## 2022-03-20 PROCEDURE — 25010000002 HYDROMORPHONE 1 MG/ML SOLUTION: Performed by: EMERGENCY MEDICINE

## 2022-03-20 PROCEDURE — 74176 CT ABD & PELVIS W/O CONTRAST: CPT | Performed by: RADIOLOGY

## 2022-03-20 PROCEDURE — 87040 BLOOD CULTURE FOR BACTERIA: CPT | Performed by: PHYSICIAN ASSISTANT

## 2022-03-20 PROCEDURE — 86140 C-REACTIVE PROTEIN: CPT | Performed by: PHYSICIAN ASSISTANT

## 2022-03-20 PROCEDURE — 87150 DNA/RNA AMPLIFIED PROBE: CPT | Performed by: PHYSICIAN ASSISTANT

## 2022-03-20 PROCEDURE — 80053 COMPREHEN METABOLIC PANEL: CPT | Performed by: PHYSICIAN ASSISTANT

## 2022-03-20 PROCEDURE — 36415 COLL VENOUS BLD VENIPUNCTURE: CPT

## 2022-03-20 PROCEDURE — 83605 ASSAY OF LACTIC ACID: CPT | Performed by: PHYSICIAN ASSISTANT

## 2022-03-20 PROCEDURE — 85025 COMPLETE CBC W/AUTO DIFF WBC: CPT | Performed by: PHYSICIAN ASSISTANT

## 2022-03-20 PROCEDURE — 99283 EMERGENCY DEPT VISIT LOW MDM: CPT

## 2022-03-20 PROCEDURE — 96376 TX/PRO/DX INJ SAME DRUG ADON: CPT

## 2022-03-20 PROCEDURE — 74176 CT ABD & PELVIS W/O CONTRAST: CPT

## 2022-03-20 PROCEDURE — 25010000002 CEFTRIAXONE IN SWFI 2 GRAMS/20ML IV PUSH SYRINGE (SIMPLE): Performed by: PHYSICIAN ASSISTANT

## 2022-03-20 PROCEDURE — 87181 SC STD AGAR DILUTION PER AGT: CPT | Performed by: PHYSICIAN ASSISTANT

## 2022-03-20 PROCEDURE — 25010000002 ONDANSETRON PER 1 MG: Performed by: PHYSICIAN ASSISTANT

## 2022-03-20 PROCEDURE — 87077 CULTURE AEROBIC IDENTIFY: CPT | Performed by: PHYSICIAN ASSISTANT

## 2022-03-20 PROCEDURE — 81001 URINALYSIS AUTO W/SCOPE: CPT | Performed by: EMERGENCY MEDICINE

## 2022-03-20 PROCEDURE — 87186 SC STD MICRODIL/AGAR DIL: CPT | Performed by: PHYSICIAN ASSISTANT

## 2022-03-20 PROCEDURE — 93971 EXTREMITY STUDY: CPT

## 2022-03-20 RX ORDER — CEFDINIR 300 MG/1
300 CAPSULE ORAL 2 TIMES DAILY
Qty: 14 CAPSULE | Refills: 0 | Status: SHIPPED | OUTPATIENT
Start: 2022-03-20 | End: 2022-03-23

## 2022-03-20 RX ORDER — ONDANSETRON 2 MG/ML
4 INJECTION INTRAMUSCULAR; INTRAVENOUS ONCE
Status: COMPLETED | OUTPATIENT
Start: 2022-03-20 | End: 2022-03-20

## 2022-03-20 RX ADMIN — CEFTRIAXONE 2 G: 2 INJECTION, POWDER, FOR SOLUTION INTRAMUSCULAR; INTRAVENOUS at 13:29

## 2022-03-20 RX ADMIN — HYDROMORPHONE HYDROCHLORIDE 1 MG: 1 INJECTION, SOLUTION INTRAMUSCULAR; INTRAVENOUS; SUBCUTANEOUS at 13:06

## 2022-03-20 RX ADMIN — ONDANSETRON 4 MG: 2 INJECTION INTRAMUSCULAR; INTRAVENOUS at 11:59

## 2022-03-20 RX ADMIN — SODIUM CHLORIDE 1000 ML: 9 INJECTION, SOLUTION INTRAVENOUS at 11:59

## 2022-03-20 RX ADMIN — HYDROMORPHONE HYDROCHLORIDE 1 MG: 1 INJECTION, SOLUTION INTRAMUSCULAR; INTRAVENOUS; SUBCUTANEOUS at 10:35

## 2022-03-21 ENCOUNTER — HOSPITAL ENCOUNTER (INPATIENT)
Facility: HOSPITAL | Age: 37
LOS: 1 days | Discharge: LEFT BEFORE DISCHARGE | End: 2022-03-21
Attending: STUDENT IN AN ORGANIZED HEALTH CARE EDUCATION/TRAINING PROGRAM | Admitting: STUDENT IN AN ORGANIZED HEALTH CARE EDUCATION/TRAINING PROGRAM

## 2022-03-21 VITALS
WEIGHT: 253.4 LBS | RESPIRATION RATE: 16 BRPM | TEMPERATURE: 98.4 F | DIASTOLIC BLOOD PRESSURE: 61 MMHG | BODY MASS INDEX: 43.26 KG/M2 | OXYGEN SATURATION: 96 % | HEIGHT: 64 IN | SYSTOLIC BLOOD PRESSURE: 101 MMHG | HEART RATE: 77 BPM

## 2022-03-21 DIAGNOSIS — R78.81 BACTEREMIA: Primary | ICD-10-CM

## 2022-03-21 PROBLEM — K68.3 RETROPERITONEAL HEMATOMA: Status: ACTIVE | Noted: 2021-12-26

## 2022-03-21 PROBLEM — E66.01 SEVERE OBESITY: Status: ACTIVE | Noted: 2018-10-23

## 2022-03-21 PROBLEM — K66.1 RETROPERITONEAL HEMATOMA: Status: ACTIVE | Noted: 2021-12-26

## 2022-03-21 LAB
ALBUMIN SERPL-MCNC: 3.84 G/DL (ref 3.5–5.2)
ALBUMIN/GLOB SERPL: 0.9 G/DL
ALP SERPL-CCNC: 115 U/L (ref 39–117)
ALT SERPL W P-5'-P-CCNC: 10 U/L (ref 1–33)
ANION GAP SERPL CALCULATED.3IONS-SCNC: 12.7 MMOL/L (ref 5–15)
AST SERPL-CCNC: 11 U/L (ref 1–32)
BACTERIA BLD CULT: ABNORMAL
BACTERIA UR QL AUTO: ABNORMAL /HPF
BASOPHILS # BLD AUTO: 0.03 10*3/MM3 (ref 0–0.2)
BASOPHILS NFR BLD AUTO: 0.5 % (ref 0–1.5)
BILIRUB SERPL-MCNC: 0.3 MG/DL (ref 0–1.2)
BILIRUB UR QL STRIP: NEGATIVE
BOTTLE TYPE: ABNORMAL
BUN SERPL-MCNC: 18 MG/DL (ref 6–20)
BUN/CREAT SERPL: 24.3 (ref 7–25)
CALCIUM SPEC-SCNC: 9.6 MG/DL (ref 8.6–10.5)
CHLORIDE SERPL-SCNC: 95 MMOL/L (ref 98–107)
CLARITY UR: ABNORMAL
CO2 SERPL-SCNC: 22.3 MMOL/L (ref 22–29)
COLOR UR: YELLOW
CREAT SERPL-MCNC: 0.74 MG/DL (ref 0.57–1)
CRP SERPL-MCNC: 0.84 MG/DL (ref 0–0.5)
D-LACTATE SERPL-SCNC: 2.1 MMOL/L (ref 0.5–2)
D-LACTATE SERPL-SCNC: 2.1 MMOL/L (ref 0.5–2)
DEPRECATED RDW RBC AUTO: 47.8 FL (ref 37–54)
EGFRCR SERPLBLD CKD-EPI 2021: 108.4 ML/MIN/1.73
EOSINOPHIL # BLD AUTO: 0.19 10*3/MM3 (ref 0–0.4)
EOSINOPHIL NFR BLD AUTO: 3.2 % (ref 0.3–6.2)
ERYTHROCYTE [DISTWIDTH] IN BLOOD BY AUTOMATED COUNT: 14.3 % (ref 12.3–15.4)
ERYTHROCYTE [SEDIMENTATION RATE] IN BLOOD: 54 MM/HR (ref 0–20)
FLUAV RNA RESP QL NAA+PROBE: NOT DETECTED
FLUBV RNA RESP QL NAA+PROBE: NOT DETECTED
GLOBULIN UR ELPH-MCNC: 4.1 GM/DL
GLUCOSE BLDC GLUCOMTR-MCNC: 139 MG/DL (ref 70–130)
GLUCOSE SERPL-MCNC: 203 MG/DL (ref 65–99)
GLUCOSE UR STRIP-MCNC: ABNORMAL MG/DL
HBA1C MFR BLD: 5.5 % (ref 4.8–5.6)
HCT VFR BLD AUTO: 30.8 % (ref 34–46.6)
HGB BLD-MCNC: 10.7 G/DL (ref 12–15.9)
HGB UR QL STRIP.AUTO: ABNORMAL
HOLD SPECIMEN: NORMAL
HOLD SPECIMEN: NORMAL
HYALINE CASTS UR QL AUTO: ABNORMAL /LPF
IMM GRANULOCYTES # BLD AUTO: 0.02 10*3/MM3 (ref 0–0.05)
IMM GRANULOCYTES NFR BLD AUTO: 0.3 % (ref 0–0.5)
KETONES UR QL STRIP: NEGATIVE
LEUKOCYTE ESTERASE UR QL STRIP.AUTO: ABNORMAL
LYMPHOCYTES # BLD AUTO: 1.86 10*3/MM3 (ref 0.7–3.1)
LYMPHOCYTES NFR BLD AUTO: 30.8 % (ref 19.6–45.3)
MCH RBC QN AUTO: 32.2 PG (ref 26.6–33)
MCHC RBC AUTO-ENTMCNC: 34.7 G/DL (ref 31.5–35.7)
MCV RBC AUTO: 92.8 FL (ref 79–97)
MONOCYTES # BLD AUTO: 0.51 10*3/MM3 (ref 0.1–0.9)
MONOCYTES NFR BLD AUTO: 8.5 % (ref 5–12)
NEUTROPHILS NFR BLD AUTO: 3.42 10*3/MM3 (ref 1.7–7)
NEUTROPHILS NFR BLD AUTO: 56.7 % (ref 42.7–76)
NITRITE UR QL STRIP: NEGATIVE
NRBC BLD AUTO-RTO: 0 /100 WBC (ref 0–0.2)
PH UR STRIP.AUTO: 6 [PH] (ref 5–8)
PLATELET # BLD AUTO: 303 10*3/MM3 (ref 140–450)
PMV BLD AUTO: 8.6 FL (ref 6–12)
POTASSIUM SERPL-SCNC: 4.2 MMOL/L (ref 3.5–5.2)
PROT SERPL-MCNC: 7.9 G/DL (ref 6–8.5)
PROT UR QL STRIP: ABNORMAL
RBC # BLD AUTO: 3.32 10*6/MM3 (ref 3.77–5.28)
RBC # UR STRIP: ABNORMAL /HPF
REF LAB TEST METHOD: ABNORMAL
SARS-COV-2 RNA RESP QL NAA+PROBE: NOT DETECTED
SODIUM SERPL-SCNC: 130 MMOL/L (ref 136–145)
SP GR UR STRIP: 1.01 (ref 1–1.03)
SQUAMOUS #/AREA URNS HPF: ABNORMAL /HPF
UROBILINOGEN UR QL STRIP: ABNORMAL
WBC # UR STRIP: ABNORMAL /HPF
WBC NRBC COR # BLD: 6.03 10*3/MM3 (ref 3.4–10.8)
WHOLE BLOOD HOLD SPECIMEN: NORMAL
WHOLE BLOOD HOLD SPECIMEN: NORMAL

## 2022-03-21 PROCEDURE — 94799 UNLISTED PULMONARY SVC/PX: CPT

## 2022-03-21 PROCEDURE — 85652 RBC SED RATE AUTOMATED: CPT | Performed by: PHYSICIAN ASSISTANT

## 2022-03-21 PROCEDURE — 87040 BLOOD CULTURE FOR BACTERIA: CPT | Performed by: PHYSICIAN ASSISTANT

## 2022-03-21 PROCEDURE — 87636 SARSCOV2 & INF A&B AMP PRB: CPT | Performed by: PHYSICIAN ASSISTANT

## 2022-03-21 PROCEDURE — 83605 ASSAY OF LACTIC ACID: CPT | Performed by: PHYSICIAN ASSISTANT

## 2022-03-21 PROCEDURE — 80053 COMPREHEN METABOLIC PANEL: CPT | Performed by: PHYSICIAN ASSISTANT

## 2022-03-21 PROCEDURE — 87086 URINE CULTURE/COLONY COUNT: CPT | Performed by: PHYSICIAN ASSISTANT

## 2022-03-21 PROCEDURE — 99285 EMERGENCY DEPT VISIT HI MDM: CPT

## 2022-03-21 PROCEDURE — 25010000002 ONDANSETRON PER 1 MG: Performed by: STUDENT IN AN ORGANIZED HEALTH CARE EDUCATION/TRAINING PROGRAM

## 2022-03-21 PROCEDURE — 25010000002 HYDROMORPHONE PER 4 MG: Performed by: STUDENT IN AN ORGANIZED HEALTH CARE EDUCATION/TRAINING PROGRAM

## 2022-03-21 PROCEDURE — 86140 C-REACTIVE PROTEIN: CPT | Performed by: PHYSICIAN ASSISTANT

## 2022-03-21 PROCEDURE — 83036 HEMOGLOBIN GLYCOSYLATED A1C: CPT | Performed by: PHYSICIAN ASSISTANT

## 2022-03-21 PROCEDURE — 25010000002 DAPTOMYCIN PER 1 MG: Performed by: PHYSICIAN ASSISTANT

## 2022-03-21 PROCEDURE — 99223 1ST HOSP IP/OBS HIGH 75: CPT | Performed by: PHYSICIAN ASSISTANT

## 2022-03-21 PROCEDURE — 25010000002 ENOXAPARIN PER 10 MG: Performed by: STUDENT IN AN ORGANIZED HEALTH CARE EDUCATION/TRAINING PROGRAM

## 2022-03-21 PROCEDURE — 81001 URINALYSIS AUTO W/SCOPE: CPT | Performed by: PHYSICIAN ASSISTANT

## 2022-03-21 PROCEDURE — 25010000002 HYDROMORPHONE 1 MG/ML SOLUTION: Performed by: STUDENT IN AN ORGANIZED HEALTH CARE EDUCATION/TRAINING PROGRAM

## 2022-03-21 PROCEDURE — 85025 COMPLETE CBC W/AUTO DIFF WBC: CPT | Performed by: PHYSICIAN ASSISTANT

## 2022-03-21 PROCEDURE — 82962 GLUCOSE BLOOD TEST: CPT

## 2022-03-21 RX ORDER — NITROGLYCERIN 0.4 MG/1
0.4 TABLET SUBLINGUAL
Status: DISCONTINUED | OUTPATIENT
Start: 2022-03-21 | End: 2022-03-22 | Stop reason: HOSPADM

## 2022-03-21 RX ORDER — DULOXETIN HYDROCHLORIDE 20 MG/1
20 CAPSULE, DELAYED RELEASE ORAL DAILY
Status: CANCELLED | OUTPATIENT
Start: 2022-03-21

## 2022-03-21 RX ORDER — LANOLIN ALCOHOL/MO/W.PET/CERES
2000 CREAM (GRAM) TOPICAL DAILY
Status: DISCONTINUED | OUTPATIENT
Start: 2022-03-22 | End: 2022-03-22 | Stop reason: HOSPADM

## 2022-03-21 RX ORDER — SODIUM CHLORIDE 0.9 % (FLUSH) 0.9 %
10 SYRINGE (ML) INJECTION AS NEEDED
Status: DISCONTINUED | OUTPATIENT
Start: 2022-03-21 | End: 2022-03-22 | Stop reason: HOSPADM

## 2022-03-21 RX ORDER — ONDANSETRON 2 MG/ML
4 INJECTION INTRAMUSCULAR; INTRAVENOUS EVERY 6 HOURS PRN
Status: DISCONTINUED | OUTPATIENT
Start: 2022-03-21 | End: 2022-03-22 | Stop reason: HOSPADM

## 2022-03-21 RX ORDER — PROMETHAZINE HYDROCHLORIDE 25 MG/1
25 TABLET ORAL EVERY 6 HOURS PRN
Status: DISCONTINUED | OUTPATIENT
Start: 2022-03-21 | End: 2022-03-22 | Stop reason: HOSPADM

## 2022-03-21 RX ORDER — ONDANSETRON 4 MG/1
4 TABLET, ORALLY DISINTEGRATING ORAL EVERY 8 HOURS PRN
COMMUNITY
End: 2022-04-25

## 2022-03-21 RX ORDER — NICOTINE POLACRILEX 4 MG
15 LOZENGE BUCCAL
Status: DISCONTINUED | OUTPATIENT
Start: 2022-03-21 | End: 2022-03-22 | Stop reason: HOSPADM

## 2022-03-21 RX ORDER — ONDANSETRON 4 MG/1
4 TABLET, ORALLY DISINTEGRATING ORAL EVERY 8 HOURS PRN
Status: CANCELLED | OUTPATIENT
Start: 2022-03-21

## 2022-03-21 RX ORDER — GABAPENTIN 300 MG/1
300 CAPSULE ORAL 2 TIMES DAILY
Status: DISCONTINUED | OUTPATIENT
Start: 2022-03-21 | End: 2022-03-22 | Stop reason: HOSPADM

## 2022-03-21 RX ORDER — BISACODYL 5 MG/1
5 TABLET, DELAYED RELEASE ORAL DAILY PRN
Status: DISCONTINUED | OUTPATIENT
Start: 2022-03-21 | End: 2022-03-22 | Stop reason: HOSPADM

## 2022-03-21 RX ORDER — PROMETHAZINE HYDROCHLORIDE 25 MG/1
25 TABLET ORAL EVERY 6 HOURS PRN
Status: CANCELLED | OUTPATIENT
Start: 2022-03-21

## 2022-03-21 RX ORDER — ACETAMINOPHEN 325 MG/1
650 TABLET ORAL EVERY 4 HOURS PRN
Status: DISCONTINUED | OUTPATIENT
Start: 2022-03-21 | End: 2022-03-22 | Stop reason: HOSPADM

## 2022-03-21 RX ORDER — SEMAGLUTIDE 1.34 MG/ML
0.25 INJECTION, SOLUTION SUBCUTANEOUS WEEKLY
COMMUNITY
End: 2022-04-28

## 2022-03-21 RX ORDER — BISACODYL 10 MG
10 SUPPOSITORY, RECTAL RECTAL DAILY PRN
Status: DISCONTINUED | OUTPATIENT
Start: 2022-03-21 | End: 2022-03-22 | Stop reason: HOSPADM

## 2022-03-21 RX ORDER — OXYCODONE AND ACETAMINOPHEN 10; 325 MG/1; MG/1
1 TABLET ORAL EVERY 6 HOURS PRN
Status: DISCONTINUED | OUTPATIENT
Start: 2022-03-21 | End: 2022-03-22 | Stop reason: HOSPADM

## 2022-03-21 RX ORDER — DULOXETIN HYDROCHLORIDE 20 MG/1
20 CAPSULE, DELAYED RELEASE ORAL DAILY
Status: CANCELLED | OUTPATIENT
Start: 2022-03-22

## 2022-03-21 RX ORDER — NALOXONE HYDROCHLORIDE 4 MG/.1ML
1 SPRAY NASAL AS NEEDED
COMMUNITY
End: 2022-05-25

## 2022-03-21 RX ORDER — AMLODIPINE BESYLATE 5 MG/1
10 TABLET ORAL DAILY
Status: CANCELLED | OUTPATIENT
Start: 2022-03-22 | End: 2023-02-17

## 2022-03-21 RX ORDER — POLYETHYLENE GLYCOL 3350 17 G/17G
17 POWDER, FOR SOLUTION ORAL DAILY PRN
Status: DISCONTINUED | OUTPATIENT
Start: 2022-03-21 | End: 2022-03-22 | Stop reason: HOSPADM

## 2022-03-21 RX ORDER — GABAPENTIN 300 MG/1
300 CAPSULE ORAL 2 TIMES DAILY
Status: CANCELLED | OUTPATIENT
Start: 2022-03-21

## 2022-03-21 RX ORDER — ONDANSETRON 2 MG/ML
4 INJECTION INTRAMUSCULAR; INTRAVENOUS ONCE
Status: COMPLETED | OUTPATIENT
Start: 2022-03-21 | End: 2022-03-21

## 2022-03-21 RX ORDER — AMOXICILLIN 250 MG
2 CAPSULE ORAL 2 TIMES DAILY
Status: DISCONTINUED | OUTPATIENT
Start: 2022-03-21 | End: 2022-03-22 | Stop reason: HOSPADM

## 2022-03-21 RX ORDER — ERGOCALCIFEROL 1.25 MG/1
50000 CAPSULE ORAL WEEKLY
COMMUNITY
End: 2022-07-22 | Stop reason: SDUPTHER

## 2022-03-21 RX ORDER — SODIUM CHLORIDE 0.9 % (FLUSH) 0.9 %
10 SYRINGE (ML) INJECTION EVERY 12 HOURS SCHEDULED
Status: DISCONTINUED | OUTPATIENT
Start: 2022-03-21 | End: 2022-03-22 | Stop reason: HOSPADM

## 2022-03-21 RX ORDER — DULOXETIN HYDROCHLORIDE 20 MG/1
20 CAPSULE, DELAYED RELEASE ORAL DAILY
Status: DISCONTINUED | OUTPATIENT
Start: 2022-03-22 | End: 2022-03-22 | Stop reason: HOSPADM

## 2022-03-21 RX ORDER — AMLODIPINE BESYLATE 5 MG/1
10 TABLET ORAL DAILY
Status: CANCELLED | OUTPATIENT
Start: 2022-03-21 | End: 2023-02-16

## 2022-03-21 RX ORDER — METOPROLOL SUCCINATE 25 MG/1
25 TABLET, EXTENDED RELEASE ORAL NIGHTLY
Status: CANCELLED | OUTPATIENT
Start: 2022-03-21

## 2022-03-21 RX ORDER — ONDANSETRON 4 MG/1
4 TABLET, ORALLY DISINTEGRATING ORAL EVERY 8 HOURS PRN
Status: DISCONTINUED | OUTPATIENT
Start: 2022-03-21 | End: 2022-03-22 | Stop reason: HOSPADM

## 2022-03-21 RX ORDER — HYDROMORPHONE HYDROCHLORIDE 1 MG/ML
0.5 INJECTION, SOLUTION INTRAMUSCULAR; INTRAVENOUS; SUBCUTANEOUS ONCE
Status: COMPLETED | OUTPATIENT
Start: 2022-03-21 | End: 2022-03-21

## 2022-03-21 RX ORDER — LANOLIN ALCOHOL/MO/W.PET/CERES
2000 CREAM (GRAM) TOPICAL DAILY
Status: CANCELLED | OUTPATIENT
Start: 2022-03-22

## 2022-03-21 RX ORDER — LANOLIN ALCOHOL/MO/W.PET/CERES
2000 CREAM (GRAM) TOPICAL DAILY
Status: CANCELLED | OUTPATIENT
Start: 2022-03-21

## 2022-03-21 RX ORDER — GABAPENTIN 300 MG/1
300 CAPSULE ORAL 2 TIMES DAILY
Status: DISCONTINUED | OUTPATIENT
Start: 2022-03-21 | End: 2022-03-21

## 2022-03-21 RX ORDER — DULOXETIN HYDROCHLORIDE 20 MG/1
20 CAPSULE, DELAYED RELEASE ORAL DAILY
COMMUNITY
End: 2022-03-22 | Stop reason: SDUPTHER

## 2022-03-21 RX ORDER — DEXTROSE MONOHYDRATE 25 G/50ML
25 INJECTION, SOLUTION INTRAVENOUS
Status: DISCONTINUED | OUTPATIENT
Start: 2022-03-21 | End: 2022-03-22 | Stop reason: HOSPADM

## 2022-03-21 RX ADMIN — HYDROMORPHONE HYDROCHLORIDE 1 MG: 1 INJECTION, SOLUTION INTRAMUSCULAR; INTRAVENOUS; SUBCUTANEOUS at 13:24

## 2022-03-21 RX ADMIN — HYDROMORPHONE HYDROCHLORIDE 0.5 MG: 1 INJECTION, SOLUTION INTRAMUSCULAR; INTRAVENOUS; SUBCUTANEOUS at 14:41

## 2022-03-21 RX ADMIN — ONDANSETRON 4 MG: 2 INJECTION INTRAMUSCULAR; INTRAVENOUS at 13:23

## 2022-03-21 RX ADMIN — ENOXAPARIN SODIUM 40 MG: 40 INJECTION SUBCUTANEOUS at 20:42

## 2022-03-21 RX ADMIN — DAPTOMYCIN 600 MG: 500 INJECTION, POWDER, LYOPHILIZED, FOR SOLUTION INTRAVENOUS at 14:14

## 2022-03-21 RX ADMIN — Medication 10 ML: at 20:42

## 2022-03-21 RX ADMIN — SODIUM CHLORIDE 1000 ML: 9 INJECTION, SOLUTION INTRAVENOUS at 13:24

## 2022-03-21 NOTE — H&P
[facility] Medicine Services  History & Physical    Patient Identification:  Name:  Natalia Arzate  Age:  36 y.o.  Sex:  female  :  1985  MRN:  [id number]   Visit Number:  [id number]  Admit Date: 3/21/2022   Primary Care Physician:  Eddie Latif APRN    Subjective     Chief complaint: Called back to ED for positive blood cultures    History of presenting illness:      Natalia Arzate is a 36 y.o. female with past medical history significant for Factor V Leyden disorder, diabetes mellitus, recent perinephric abscesses, GERD colic gutter/RP abscess, left trapezium abscess with growth of MRSA at [facility], history of stroke, right lower extremity and left lower extremity DVTs with history of recurrent DVT PE with known factor V, atrial fibrillation, stroke, gout, anxiety, asthma, and anemia.     She has had multiple recent hospitalizations with a prolonged hospitalization at [facility] from 2021 through February 15, 2022 with acute blood loss anemia secondary to retroperitoneal hematoma with Eliquis restarted following discharge during which time she did receive a Sherif filter.  Stay was also complicated by pyelonephritis with concern for renal abscesses versus infected hematoma during which time she received left ureteral stent status post removal.  She completed a 6-week course of IV vancomycin and Rocephin while inpatient for numerous abscesses and infections as previously outlined and was then prescribed 2 weeks of Bactrim and Omnicef as outpatient.  Was also found to have acute/subacute right cerebral ischemic stroke noted on 2022 via CT with neurology recommending continue anticoagulation.    She was later readmitted to [facility] on March 15, 2022 through 2022 during which time she had left lower quadrant pain felt to be secondary to perinephric and abdominal abscesses with drain dislodgment.  She was treated  with broad-spectrum antibiotics which were discontinued by ID per note with blood cultures showing no growth and interventional radiology performing a drain placement to the left lower back with success and urology evaluating her left ureteral stent with recommendation of no acute urological intervention at the time of admission.  Ultrasound venous Doppler was also performed and revealed no evidence of DVT.      Patient then presented to the emergency department of [facility] on March 18, 2022 for abdominal pain.She underwent imaging studies and was discharged with cefdinir 300 mg twice daily with CT abdomen pelvis revealing percutaneous drain in left lower quadrant with minimal residual collection and left-sided double-J stent with calcification seen in the left UPJ region in the right kidney but no known evidence of obstruction on today's examination. Natalia Arzate did also undergo ultrasound venous Doppler of the lower extremities found no DVT was discharged home from the emergency department with oral antibiotics as outlined.      In the interim,  blood cultures returned positive for Enterococcus and she was called to return to the ED for IV abx. Upon arrival to the ED, vital signs were T 98.8F, pulse 87, RR 16, and /87.  White blood cell count was found to be 6.03 with lactate 2.1 and C-reactive protein faintly elevated at 0.84.  Glucose 203 with sodium noted to be 130.  Urinalysis with large leukocytes negative nitrites 6-12 WBCs with 1+ bacteria.  Prior days UA with too numerous to count white blood cells large leukocytes and too numerous to count blood as well (urine was not cultured).     Mrs. Arzate reports left flank pain and fatigue with chills. She reports some left-side abdominal pain with flank pain worse over the past 48 hours. She reports some mild nausea.  She reports she has had some difficulty with bearing weight on her LLE since her recent CVA.  She denies chest pain, dyspnea,  cough, and wheeze.  She has had multiple recent IV and oral antibiotics over the past 90 days.     ---------------------------------------------------------------------------------------------------------------------   Review of Systems   Constitutional: Positive for chills and fatigue. Negative for fever.   HENT: Negative for congestion and drooling.    Eyes: Negative for pain and discharge.   Respiratory: Negative for cough, choking, chest tightness and shortness of breath.    Cardiovascular: Negative for chest pain and leg swelling.   Gastrointestinal: Positive for nausea. Negative for abdominal distention, abdominal pain, diarrhea and vomiting.   Endocrine: Negative for cold intolerance and heat intolerance.   Genitourinary: Positive for flank pain. Negative for difficulty urinating and dyspareunia.   Musculoskeletal: Negative for arthralgias, gait problem and joint swelling.   Skin: Negative for color change and pallor.   Allergic/Immunologic: Negative for environmental allergies and food allergies.   Neurological: Negative for dizziness and headaches.   Hematological: Negative for adenopathy. Does not bruise/bleed easily.   Psychiatric/Behavioral: Negative for agitation and confusion.        ---------------------------------------------------------------------------------------------------------------------   Past Medical History:   Diagnosis Date   • A-fib ([facility])    • Abnormal ECG    • Anemia    • Anxiety    • Asthma    • Cancer ([facility])     Ovarian   • Depression    • Diabetes mellitus ([facility])    • DVT (deep venous thrombosis) ([facility])    • Factor 5 Leiden mutation, heterozygous ([facility])    • Fibroid    • GERD (gastroesophageal reflux disease)    • Gout    • H/O abdominal abscess    • Hyperlipidemia    • Hypothyroid    • Kidney stone    • Migraines    • Neuropathy    • Ovarian cancer (HCC) 02/2021   • Ovarian cyst    • PE (pulmonary embolism)    • Polycystic ovary syndrome    • Preeclampsia    • Rh incompatibility    •  "Stroke (HCC)    • TIA (transient ischemic attack)    • Urinary tract infection    • Varicella      Past Surgical History:   Procedure Laterality Date   • ABDOMINAL SURGERY     • CARDIAC CATHETERIZATION     •  SECTION     • CHOLECYSTECTOMY     • COLONOSCOPY     • ENDOSCOPY     • EXTRACORPOREAL SHOCK WAVE LITHOTRIPSY (ESWL) Left 10/22/2021    Procedure: EXTRACORPOREAL SHOCKWAVE LITHOTRIPSY;  Surgeon: Milan Motley MD;  Location: [facility];  Service: Urology;  Laterality: Left;   • LITHOTRIPSY     • RIGHT OOPHORECTOMY     • URETEROSCOPY LASER LITHOTRIPSY WITH STENT INSERTION Left 10/1/2021    Procedure: URETEROSCOPY WITH STENT PLACEMENT;  Surgeon: Milan Motley MD;  Location: [facility];  Service: Urology;  Laterality: Left;     Family History   Problem Relation Age of Onset   • Hypertension Mother    • Diabetes Father    • Hypertension Father    • Heart attack Father    • No Known Problems Sister    • No Known Problems Brother    • No Known Problems Son    • No Known Problems Daughter    • No Known Problems Maternal Grandmother    • No Known Problems Maternal Grandfather    • No Known Problems Paternal Grandmother    • No Known Problems Paternal Grandfather    • No Known Problems Cousin    • Rheum arthritis Neg Hx    • Osteoarthritis Neg Hx    • Asthma Neg Hx    • Heart failure Neg Hx    • Hyperlipidemia Neg Hx    • Migraines Neg Hx    • Rashes / Skin problems Neg Hx    • Seizures Neg Hx    • Stroke Neg Hx    • Thyroid disease Neg Hx      Social History     Socioeconomic History   • Marital status:    Tobacco Use   • Smoking status: Never Smoker   • Smokeless tobacco: Never Used   Vaping Use   • Vaping Use: Never used   Substance and Sexual Activity   • Alcohol use: No   • Drug use: Never     Comment: Pt has track marks on right arm. Pt states \"It's from my INR being drawn.\"    • Sexual activity: Not Currently     Partners: Male     Birth control/protection: None "     ---------------------------------------------------------------------------------------------------------------------   Allergies:  Toradol [ketorolac tromethamine], Haldol [haloperidol], Tramadol, Amoxicillin, and Penicillins  ---------------------------------------------------------------------------------------------------------------------   Home medications:    Medications below are reported home medications pulling from within the system; at this time, these medications have not been reconciled unless otherwise specified and are in the verification process for further verifcation as current home medications.  (Not in a hospital admission)      Hospital Scheduled Meds:    No current facility-administered medications for this encounter.      Current listed hospital scheduled medications may not yet reflect those currently placed in orders that are signed and held awaiting patient's arrival to floor.   ---------------------------------------------------------------------------------------------------------------------     Objective     Vital Signs:  Temp:  [98.7 °F (37.1 °C)-98.8 °F (37.1 °C)] 98.7 °F (37.1 °C)  Heart Rate:  [73-87] 73  Resp:  [16-18] 18  BP: (116-150)/(65-99) 116/72      03/21/22  0955   Weight: 104 kg (230 lb)     Body mass index is 39.48 kg/m².  ---------------------------------------------------------------------------------------------------------------------       Physical Exam  Constitutional:       General: She is awake.   HENT:      Head: Normocephalic and atraumatic.      Comments: Amylopia of the left eye  Eyes:      General: Lids are normal.      Conjunctiva/sclera:      Right eye: Right conjunctiva is not injected.      Left eye: Left conjunctiva is not injected.   Cardiovascular:      Rate and Rhythm: Normal rate and regular rhythm.      Heart sounds: S1 normal and S2 normal.   Pulmonary:      Effort: No tachypnea or bradypnea.      Breath sounds: No stridor or decreased air  movement. No decreased breath sounds, wheezing, rhonchi or rales.   Abdominal:      General: Bowel sounds are normal.      Palpations: Abdomen is soft.      Tenderness: There is no abdominal tenderness.       Musculoskeletal:      Cervical back: Full passive range of motion without pain and normal range of motion.      Right lower leg: No swelling. No edema.      Left lower leg: No swelling. No edema.   Skin:     General: Skin is warm and dry.          Neurological:      Mental Status: She is alert and oriented to person, place, and time.   Psychiatric:         Attention and Perception: Attention normal.         Mood and Affect: Mood normal.         Behavior: Behavior is cooperative.           ---------------------------------------------------------------------------------------------------------------------  EKG:                I have personally looked at both the EKG and the telemetry strips.  ---------------------------------------------------------------------------------------------------------------------   Results from last 7 days   Lab Units 03/21/22  1317 03/21/22  1022 03/20/22  0950   CRP mg/dL  --  0.84* 0.74*   LACTATE mmol/L 2.1* 2.1* 3.2*   WBC 10*3/mm3  --  6.03 6.04   HEMOGLOBIN g/dL  --  10.7* 10.7*   HEMATOCRIT %  --  30.8* 31.2*   MCV fL  --  92.8 92.9   MCHC g/dL  --  34.7 34.3   PLATELETS 10*3/mm3  --  303 306         Results from last 7 days   Lab Units 03/21/22  1022 03/20/22  0950   SODIUM mmol/L 130* 132*   POTASSIUM mmol/L 4.2 4.0   CHLORIDE mmol/L 95* 97*   CO2 mmol/L 22.3 22.1   BUN mg/dL 18 15   CREATININE mg/dL 0.74 0.78   CALCIUM mg/dL 9.6 9.7   GLUCOSE mg/dL 203* 251*   ALBUMIN g/dL 3.84 3.76   BILIRUBIN mg/dL 0.3 0.2   ALK PHOS U/L 115 116   AST (SGOT) U/L 11 13   ALT (SGPT) U/L 10 10   Estimated Creatinine Clearance: 124.6 mL/min (by C-G formula based on SCr of 0.74 mg/dL).  No results found for: AMMONIA          No results found for: HGBA1C  Lab Results   Component Value Date     TSH 3.780 03/13/2022    FREET4 1.14 11/25/2019     Lab Results   Component Value Date    PREGTESTUR Negative 03/07/2022    HCGQUANT <0.50 03/04/2020     Pain Management Panel     Pain Management Panel Latest Ref Rng & Units 3/20/2022 3/7/2022    AMPHETAMINES SCREEN, URINE Negative Negative Negative    BARBITURATES SCREEN Negative Negative Negative    BENZODIAZEPINE SCREEN, URINE Negative Negative Negative    BUPRENORPHINEUR Negative Negative Negative    COCAINE SCREEN, URINE Negative Negative Negative    METHADONE SCREEN, URINE Negative Negative Negative    METHAMPHETAMINEUR Negative Negative Negative        Blood Culture   Date Value Ref Range Status   03/20/2022 No growth at 24 hours  Preliminary   03/20/2022 Abnormal Stain (C)  Preliminary     No results found for: URINECX  No results found for: WOUNDCX  No results found for: STOOLCX      ---------------------------------------------------------------------------------------------------------------------  US Venous Doppler Lower Extremity Left (duplex)    Result Date: 3/20/2022  No DVT in the left lower extremity on today's exam.   This report was finalized on 3/20/2022 12:04 PM by Dr. Ajay De Guzman MD.      CT Abdomen Pelvis Stone Protocol    Result Date: 3/20/2022   1. Percutaneous drain in the left lower quadrant. Minimal residual collection is present  2.Left-sided double-J stent. Calcifications are seen in the left UPJ region and in the right kidney but no evidence of obstruction on today's exam.  3. Other findings as above     This report was finalized on 3/20/2022 11:49 AM by Dr. Ajay De Guzman MD.        Cultures:  Blood Culture   Date Value Ref Range Status   03/20/2022 No growth at 24 hours  Preliminary   03/20/2022 Abnormal Stain (C)  Preliminary     Microbiology Results (last 10 days)     Procedure Component Value - Date/Time    COVID-19 and FLU A/B PCR - Swab, Nasopharynx [[id number]]  (Normal) Collected: 03/21/22 1056    Lab Status: Final result  Specimen: Swab from Nasopharynx Updated: 03/21/22 1125     COVID19 Not Detected     Influenza A PCR Not Detected     Influenza B PCR Not Detected    Narrative:      Fact sheet for providers: https://www.fda.gov/media/040285/download    Fact sheet for patients: https://www.fda.gov/media/497823/download    Test performed by PCR.    Blood Culture - Blood, Hand, Left [[id number]]  (Normal) Collected: 03/20/22 1016    Lab Status: Preliminary result Specimen: Blood from Hand, Left Updated: 03/21/22 1049     Blood Culture No growth at 24 hours    Blood Culture - Blood, Wrist, Right [[id number]]  (Abnormal) Collected: 03/20/22 1012    Lab Status: Preliminary result Specimen: Blood from Wrist, Right Updated: 03/21/22 0601     Blood Culture Abnormal Stain     Gram Stain Aerobic Bottle Gram positive cocci in pairs and chains    Blood Culture ID, PCR - Blood, Wrist, Right [[id number]]  (Abnormal) Collected: 03/20/22 1012    Lab Status: Final result Specimen: Blood from Wrist, Right Updated: 03/21/22 0601     BCID, PCR Enterococcus faecium. Donita/B (vancomycin resistance gene) detected. Identification by BCID2 PCR.     BOTTLE TYPE Aerobic Bottle    Narrative:      Infectious disease consultation is highly recommended to rule out distant foci of infection.    Wound Culture - Wound, Buttock [[id number]] Collected: 03/13/22 1019    Lab Status: Final result Specimen: Wound from Buttock Updated: 03/15/22 0600     Wound Culture Scant growth (1+) Normal Skin Margo     Gram Stain Rare (1+) WBCs seen      No organisms seen    Urine Culture - Urine, Urine, Catheter [[id number]] Collected: 03/13/22 0934    Lab Status: Final result Specimen: Urine, Catheter Updated: 03/14/22 1147     Urine Culture 25,000 CFU/mL Normal Urogenital Margo    Blood Culture - Blood, Arm, Left [[id number]]  (Normal) Collected: 03/13/22 0928    Lab Status: Final result Specimen: Blood from Arm, Left Updated: 03/18/22 0948     Blood Culture No growth at 5 days    Blood  Culture - Blood, Hand, Left [[id number]]  (Normal) Collected: 03/13/22 0928    Lab Status: Final result Specimen: Blood from Hand, Left Updated: 03/18/22 0948     Blood Culture No growth at 5 days            I have personally reviewed the above radiology images and read the final radiology report on 03/21/22  ---------------------------------------------------------------------------------------------------------------------  Assessment / Plan     Active Hospital Problems    Diagnosis  POA   • Bacteremia [R78.81]  Yes       ASSESSMENT/PLAN:    -VRE Enterococcus faecium bacteremia:   -Lactic acidosis likely 2/2 bacteremia  -Left-side ureteral double J stent placed 12/28/21:   -LLQ Percutaenous drain:   -Hx L paracolic gutter/RP, L kidney, L trapezium abscesses with growth of MRSA @ [facility]:   • Consult Urology for stent removal.   • Consult ID for further antimicrobial guidance.   · Repeat peripheral blood culture x2.   · Repeat AM CBC.   · Continue IV Daptomycin for now.   · Follow vital signs per protocol.   · Reports she has been on PRN Percocet at home for pain control starting 03/15/22 per review of chart.   · Multiple records from recent hospitalizations at OSH reviewed.   · 03/20/22 CT abd/pelvis reviewed.   · While lactate>2, WBC is WNL with RR WNL and she is afebrile.     -Factor V Leiden & T3372L MTHFR gene mutation with hx of recurrent DVTs and PEs:  -IVC filter in 12/2021:   · Continue home Eliquis (previously on Xarelto and Coumadin in the past).   · Continue outpatient follow-up with Hem/Onc.   · March 4, 2022 office visit record with Dr. Constantino reviewed.   ·     -Hyperglycemia in setting of DM 2:   -Pseudohyponatremia in setting of hyperglycemia:   -Peripheral neuropathy:   Given diabetes mellitus, fingerstick blood glucose monitoring has been ordered AC&HS.   Sliding scale insulin has been ordered PRN.    Hypoglycemia protocol on board if needed.      -Hx R cerebellar CVA in January 2022:  -Hx  paroxysmal atrial fibrillation  -Hx prior TIA  · Continue home Eliquis.     ----------  -DVT prophylaxis: Eliquis to serve  -Activity: Up with assist  -Expected length of stay: INPATIENT status due to the need for care which can only be reasonably provided in an hospital setting such as aggressive/expedited ancillary services and/or consultation services, the necessity for IV medications, close physician monitoring and/or the possible need for procedures.  In such, I feel patient’s risk for adverse outcomes and need for care warrant INPATIENT evaluation and predict the patient’s care encounter to likely last beyond 2 midnights.  -Disposition: Pending course of care    High risk secondary to VRE bacteremia in setting of ureteral stent and prior multiple recent abscesses with infection, diabetes mellitus, Factor V & MTHFR gene mutations with multiple prior VTE, obesity by BMI 39.48 kg/m2        Marly Franklin PA-C  03/21/22  16:32 EDT  Pager # [phone]  ---------------------------------------------------------------------------------------------------------------------

## 2022-03-21 NOTE — ED NOTES
Attempted to call report to 3N at this time. Staff awaiting call back for orders from house supervisor regarding patient room assignment at this time.

## 2022-03-21 NOTE — PLAN OF CARE
Goal Outcome Evaluation:  Plan of Care Reviewed With: patient        Progress: no change  Outcome Evaluation: Patient to room 326. Assessment complete

## 2022-03-21 NOTE — H&P
[facility]  History & Physical    Patient Identification:  Name:  Natalia Arzate  Age:  36 y.o.  Sex:  female  :  1985  MRN:  [id number]   Visit Number:  [id number]  Admit Date: 3/21/2022   Primary Care Physician:  Eddie Latif APRN    Subjective     Chief complaint: Called back to ED for positive blood cultures    History of presenting illness:      Natalia Arzate is a 36 y.o. female with past medical history significant for Factor V Leyden disorder, diabetes mellitus, recent perinephric abscesses, GERD colic gutter/RP abscess, left trapezium abscess with growth of MRSA at [facility], history of stroke, right lower extremity and left lower extremity DVTs with history of recurrent DVT PE with known factor V, atrial fibrillation, stroke, gout, anxiety, asthma, and anemia***    She has had multiple recent hospitalizations with a prolonged hospitalization at [facility] from 2021 through February 15, 2022 with acute blood loss anemia secondary to retroperitoneal hematoma with Eliquis restarted following discharge during which time she did receive a Sherif filter.  Stay was also complicated by pyelonephritis with concern for renal abscesses versus infected hematoma during which time she received left ureteral stent status post removal.  She completed a 6-week course of IV vancomycin and Rocephin while inpatient for numerous abscesses and infections as previously outlined and was then prescribed 2 weeks of Bactrim and Omnicef as outpatient.  Was also found to have acute/subacute right cerebral ischemic stroke noted on 2022 via CT with neurology recommending continue anticoagulation.    She was later readmitted to [facility] on March 15, 2022 through 2022 during which time she had left lower quadrant pain felt to be secondary to perinephric and abdominal abscesses with drain dislodgment.  She was treated  with broad-spectrum antibiotics which were discontinued by ID per note with blood cultures showing no growth and interventional radiology performing a drain placement to the left lower back with success and urology evaluating her left ureteral stent with recommendation of no acute urological intervention at the time of admission.  Ultrasound venous Doppler was also performed and revealed no evidence of DVT.      Patient then presented to the emergency department of [facility] on March 18, 2022 for abdominal pain.She underwent imaging studies and was discharged with cefdinir 300 mg twice daily with CT abdomen pelvis revealing percutaneous drain in left lower quadrant with minimal residual collection and left-sided double-J stent with calcification seen in the left UPJ region in the right kidney but no known evidence of obstruction on today's examination. Natalia Arzate did also undergo ultrasound venous Doppler of the lower extremities found no DVT was discharged home from the emergency department with oral antibiotics as outlined.      In the interim,  blood cultures returned positive for Enterococcus and she was called to return to the ED for IV abx. Upon arrival to the ED, vital signs were T 98.8F, pulse 87, RR 16, and /87.  White blood cell count was found to be 6.03 with lactate 2.1 and C-reactive protein faintly elevated at 0.84.  Glucose 203 with sodium noted to be 130.  Urinalysis with large leukocytes negative nitrites 6-12 WBCs with 1+ bacteria.  Prior days UA with too numerous to count white blood cells large leukocytes and too numerous to count blood as well (urine was not cultured).     Natalia Arzate reports left-side flank pain that has been present for greater than 48 hours. She reports she has had fatigue and chills.  She denies chest pain, dyspnea, cough, and wheeze.   She denies denies dysuria and hematuria.  She denies headache. She reports she has been poorly ambulatory with  difficulty bearing weight on her LLE since her recent stroke in January 2022.   She reports ureteral stent has been present several months.         ---------------------------------------------------------------------------------------------------------------------   Review of Systems   Constitutional: Positive for fatigue.   HENT: Negative for congestion, drooling and facial swelling.    Eyes: Negative for pain and discharge.   Respiratory: Negative for cough, shortness of breath and wheezing.    Cardiovascular: Negative for chest pain and leg swelling.   Gastrointestinal: Positive for abdominal pain. Negative for abdominal distention, diarrhea, nausea and vomiting.   Endocrine: Negative for cold intolerance and heat intolerance.   Genitourinary: Positive for flank pain. Negative for difficulty urinating, dysuria and hematuria.   Musculoskeletal: Negative for arthralgias and gait problem.   Skin: Negative for color change and pallor.   Allergic/Immunologic: Negative for environmental allergies and food allergies.   Neurological: Positive for weakness. Negative for dizziness and headaches.   Hematological: Negative for adenopathy. Does not bruise/bleed easily.   Psychiatric/Behavioral: Negative for agitation and confusion. Hyperactive:            ---------------------------------------------------------------------------------------------------------------------   Past Medical History:   Diagnosis Date   • A-fib (HCC)    • Abnormal ECG    • Anemia    • Anxiety    • Asthma    • Cancer (HCC)     Ovarian   • Depression    • Diabetes mellitus (HCC)    • DVT (deep venous thrombosis) ([facility])    • Factor 5 Leiden mutation, heterozygous ([facility])    • Fibroid    • GERD (gastroesophageal reflux disease)    • Gout    • Hyperlipidemia    • Hypothyroid    • Kidney stone    • Migraines    • Neuropathy    • Ovarian cancer (HCC) Feb 2021   • Ovarian cyst    • PE (pulmonary embolism)    • Polycystic ovary syndrome    • Preeclampsia    •  "Rh incompatibility    • Stroke (HCC)    • TIA (transient ischemic attack)    • Urinary tract infection    • Varicella      Past Surgical History:   Procedure Laterality Date   • ABDOMINAL SURGERY     • CARDIAC CATHETERIZATION     •  SECTION     • CHOLECYSTECTOMY     • COLONOSCOPY     • ENDOSCOPY     • EXTRACORPOREAL SHOCK WAVE LITHOTRIPSY (ESWL) Left 10/22/2021    Procedure: EXTRACORPOREAL SHOCKWAVE LITHOTRIPSY;  Surgeon: Milan Motley MD;  Location: [facility];  Service: Urology;  Laterality: Left;   • LITHOTRIPSY     • RIGHT OOPHORECTOMY     • URETEROSCOPY LASER LITHOTRIPSY WITH STENT INSERTION Left 10/1/2021    Procedure: URETEROSCOPY WITH STENT PLACEMENT;  Surgeon: Milan Motley MD;  Location: [facility];  Service: Urology;  Laterality: Left;     Family History   Problem Relation Age of Onset   • Hypertension Mother    • Diabetes Father    • Hypertension Father    • Heart attack Father    • No Known Problems Sister    • No Known Problems Brother    • No Known Problems Son    • No Known Problems Daughter    • No Known Problems Maternal Grandmother    • No Known Problems Maternal Grandfather    • No Known Problems Paternal Grandmother    • No Known Problems Paternal Grandfather    • No Known Problems Cousin    • Rheum arthritis Neg Hx    • Osteoarthritis Neg Hx    • Asthma Neg Hx    • Heart failure Neg Hx    • Hyperlipidemia Neg Hx    • Migraines Neg Hx    • Rashes / Skin problems Neg Hx    • Seizures Neg Hx    • Stroke Neg Hx    • Thyroid disease Neg Hx      Social History     Socioeconomic History   • Marital status:    Tobacco Use   • Smoking status: Never Smoker   • Smokeless tobacco: Never Used   Vaping Use   • Vaping Use: Never used   Substance and Sexual Activity   • Alcohol use: No   • Drug use: Never     Comment: Pt has track marks on right arm. Pt states \"It's from my INR being drawn.\"    • Sexual activity: Not Currently     Partners: Male     Birth control/protection: " None     ---------------------------------------------------------------------------------------------------------------------   Allergies:  Toradol [ketorolac tromethamine], Haldol [haloperidol], Tramadol, Amoxicillin, and Penicillins  ---------------------------------------------------------------------------------------------------------------------   Home medications:    Medications below are reported home medications pulling from within the system; at this time, these medications have not been reconciled unless otherwise specified and are in the verification process for further verifcation as current home medications.  (Not in a hospital admission)      Hospital Scheduled Meds:    No current facility-administered medications for this encounter.      Current listed hospital scheduled medications may not yet reflect those currently placed in orders that are signed and held awaiting patient's arrival to floor.   ---------------------------------------------------------------------------------------------------------------------     Objective     Vital Signs:  Temp:  [98.7 °F (37.1 °C)-98.8 °F (37.1 °C)] 98.7 °F (37.1 °C)  Heart Rate:  [79-87] 79  Resp:  [16-18] 18  BP: (116-150)/(81-99) 116/81      03/21/22  0955   Weight: 104 kg (230 lb)     Body mass index is 39.48 kg/m².  ---------------------------------------------------------------------------------------------------------------------       Physical Exam  Vitals and nursing note reviewed.   Constitutional:       General: She is awake.      Appearance: Normal appearance. She is well-developed.   HENT:      Head: Normocephalic and atraumatic.   Eyes:      General: Lids are normal.      Conjunctiva/sclera:      Right eye: Right conjunctiva is not injected.      Left eye: Left conjunctiva is not injected.   Cardiovascular:      Rate and Rhythm: Normal rate and regular rhythm.      Heart sounds: S1 normal and S2 normal.   Pulmonary:      Effort: No tachypnea or  bradypnea.      Breath sounds: No decreased breath sounds, wheezing, rhonchi or rales.   Abdominal:      General: Bowel sounds are normal.      Palpations: Abdomen is soft.       Musculoskeletal:      Right lower leg: No swelling. No edema.      Left lower leg: No swelling. No edema.   Skin:         Neurological:      Mental Status: She is alert and oriented to person, place, and time.   Psychiatric:         Attention and Perception: Attention normal.         Mood and Affect: Mood normal.         Behavior: Behavior is cooperative.           ---------------------------------------------------------------------------------------------------------------------  EKG:              Telemetry:  ***  I have personally looked at both the EKG and the telemetry strips.  ---------------------------------------------------------------------------------------------------------------------   Results from last 7 days   Lab Units 03/21/22  1317 03/21/22  1022 03/20/22  0950   CRP mg/dL  --  0.84* 0.74*   LACTATE mmol/L 2.1* 2.1* 3.2*   WBC 10*3/mm3  --  6.03 6.04   HEMOGLOBIN g/dL  --  10.7* 10.7*   HEMATOCRIT %  --  30.8* 31.2*   MCV fL  --  92.8 92.9   MCHC g/dL  --  34.7 34.3   PLATELETS 10*3/mm3  --  303 306         Results from last 7 days   Lab Units 03/21/22  1022 03/20/22  0950   SODIUM mmol/L 130* 132*   POTASSIUM mmol/L 4.2 4.0   CHLORIDE mmol/L 95* 97*   CO2 mmol/L 22.3 22.1   BUN mg/dL 18 15   CREATININE mg/dL 0.74 0.78   CALCIUM mg/dL 9.6 9.7   GLUCOSE mg/dL 203* 251*   ALBUMIN g/dL 3.84 3.76   BILIRUBIN mg/dL 0.3 0.2   ALK PHOS U/L 115 116   AST (SGOT) U/L 11 13   ALT (SGPT) U/L 10 10   Estimated Creatinine Clearance: 124.6 mL/min (by C-G formula based on SCr of 0.74 mg/dL).  No results found for: AMMONIA          No results found for: HGBA1C  Lab Results   Component Value Date    TSH 3.780 03/13/2022    FREET4 1.14 11/25/2019     Lab Results   Component Value Date    PREGTESTUR Negative 03/07/2022    HCGQUANT <0.50  03/04/2020     Pain Management Panel     Pain Management Panel Latest Ref Rng & Units 3/20/2022 3/7/2022    AMPHETAMINES SCREEN, URINE Negative Negative Negative    BARBITURATES SCREEN Negative Negative Negative    BENZODIAZEPINE SCREEN, URINE Negative Negative Negative    BUPRENORPHINEUR Negative Negative Negative    COCAINE SCREEN, URINE Negative Negative Negative    METHADONE SCREEN, URINE Negative Negative Negative    METHAMPHETAMINEUR Negative Negative Negative        Blood Culture   Date Value Ref Range Status   03/20/2022 No growth at 24 hours  Preliminary   03/20/2022 Abnormal Stain (C)  Preliminary     No results found for: URINECX  No results found for: WOUNDCX  No results found for: STOOLCX      ---------------------------------------------------------------------------------------------------------------------  US Venous Doppler Lower Extremity Left (duplex)    Result Date: 3/20/2022  No DVT in the left lower extremity on today's exam.   This report was finalized on 3/20/2022 12:04 PM by Dr. Ajay De Guzman MD.      CT Abdomen Pelvis Stone Protocol    Result Date: 3/20/2022   1. Percutaneous drain in the left lower quadrant. Minimal residual collection is present  2.Left-sided double-J stent. Calcifications are seen in the left UPJ region and in the right kidney but no evidence of obstruction on today's exam.  3. Other findings as above     This report was finalized on 3/20/2022 11:49 AM by Dr. Ajay De Guzman MD.        Cultures:  Blood Culture   Date Value Ref Range Status   03/20/2022 No growth at 24 hours  Preliminary   03/20/2022 Abnormal Stain (C)  Preliminary       Last echocardiogram:          I have personally reviewed the above radiology images and read the final radiology report on 03/21/22  ---------------------------------------------------------------------------------------------------------------------  Assessment / Plan     There are no active hospital problems to display for this  patient.      ASSESSMENT/PLAN:    -VRE Enterococcus faecium bacteremia:   -Left-side ureteral double J stent placed 12/28/21:   -LLQ Percutaenous drain:   -Hx L paracolic gutter/RP, L kidney, L trapezium abscesses with growth of MRSA @ [facility]:   • Consult Urology for stent removal.   • Consult ID.   -Factor V Leiden & F1500X MTHFR gene mutation with hx of recurrent DVTs and PEs:  -IVC filter in 12/2021:   · Continue home Eliquis (previously on Xarelto and Coumadin in the past).   · Continue outpatient follow-up with Hem/Onc.   · March 4, 2022 office visit record with Dr. Constantino reviewed.     -Hyperglycemia in setting of DM 2:   -Pseudohyponatremia in setting of hyperglycemia:   -Peripheral neuropathy:     -Hx R cerebellar CVA in January 2022:  -Hx paroxysmal atrial fibrillation  -Hx prior TIA      ----------  -DVT prophylaxis:   -Activity: ***  -Expected length of stay:   -Disposition ***    High risk secondary to ***        Marly Franklin PA-C  03/21/22  14:48 EDT  Pager # [phone]  ---------------------------------------------------------------------------------------------------------------------

## 2022-03-22 LAB — BACTERIA SPEC AEROBE CULT: NORMAL

## 2022-03-22 RX ORDER — DULOXETIN HYDROCHLORIDE 20 MG/1
20 CAPSULE, DELAYED RELEASE ORAL DAILY
Qty: 30 CAPSULE | Refills: 0 | Status: SHIPPED | OUTPATIENT
Start: 2022-03-22 | End: 2022-04-18 | Stop reason: SDUPTHER

## 2022-03-22 NOTE — CASE MANAGEMENT/SOCIAL WORK
Case Management/Social Work    Patient Name:  Natalia Arzate  YOB: 1985  MRN: [id number]  Admit Date:  3/21/2022        SS received ED MD consult for possible home health. SS attempted to contact Mrs. Arzate but was not successful. SS contacted Pt's PCP Micheal Latif's RN, Monie [phone] who states Pt currently has [facility]. No other needs identified.       Electronically signed by:  Elisha Colin  03/22/22 15:36 EDT

## 2022-03-22 NOTE — PROGRESS NOTES
Notified by RN that patient was requesting to leave AMA due to not getting pain medication. Discussed with attending Dr. Nuñez and we ordered her home Neurontin as well as PRN Percocet that she has been receiving intermittently at home. I discussed with patient at bedside the dangers of leaving AMA without completion of IV antibiotics for treatment of her bacteremia and explained that this could possibly lead to worsening symptoms and even death. Patient was instructed that if she has worsening symptoms then she needs to return to the hospital immediately. Patient verbalized understanding and still decided to leave AMA as she desires to go back to [facility] where she was initially treated.

## 2022-03-22 NOTE — DISCHARGE SUMMARY
Patient Name: Natalia Arzate  : 1985  MRN: [id number]    Date of Admission: 3/21/2022  Date of Elopement:  22  Primary Care Physician: Eddie Latif APRN    Consults     No orders found for last 30 day(s).        Hospital Course     Presenting Problem:   Bacteremia [R78.81]    Active Hospital Problems    Diagnosis  POA   • Bacteremia [R78.81]  Yes      Resolved Hospital Problems   No resolved problems to display.          Hospital Course:  Natalia Arzate is a 36 y.o. female with past medical history significant for Factor V Leiden disorder, diabetes mellitus, recent perinephric abscesses, GERD colic gutter/RP abscess, left trapezium abscess with growth of MRSA at [facility], history of stroke, right lower extremity and left lower extremity DVTs with history of recurrent DVT PE with known factor V, atrial fibrillation, stroke, gout, anxiety, asthma, and anemia.      She has had multiple recent hospitalizations with a prolonged hospitalization at [facility] from 2021 through February 15, 2022 with acute blood loss anemia secondary to retroperitoneal hematoma with Eliquis restarted following discharge during which time she did receive a [place] filter.  Stay was also complicated by pyelonephritis with concern for renal abscesses versus infected hematoma during which time she received left ureteral stent status post removal.  She completed a 6-week course of IV vancomycin and Rocephin while inpatient for numerous abscesses and infections as previously outlined and was then prescribed 2 weeks of Bactrim and Omnicef as outpatient.  Was also found to have acute/subacute right cerebral ischemic stroke noted on 2022 via CT with neurology recommending continue anticoagulation.     She was later readmitted to [facility] on March 15, 2022 through 2022 during which time she had left lower quadrant pain felt to be secondary to perinephric and  abdominal abscesses with drain dislodgment.  She was treated with broad-spectrum antibiotics which were discontinued by ID per note with blood cultures showing no growth and interventional radiology performing a drain placement to the left lower back with success and urology evaluating her left ureteral stent with recommendation of no acute urological intervention at the time of admission.  Ultrasound venous Doppler was also performed and revealed no evidence of DVT.        Patient then presented to the emergency department of [facility] on March 18, 2022 for abdominal pain.She underwent imaging studies and was discharged with cefdinir 300 mg twice daily with CT abdomen pelvis revealing percutaneous drain in left lower quadrant with minimal residual collection and left-sided double-J stent with calcification seen in the left UPJ region in the right kidney but no known evidence of obstruction on today's examination. Natalia Arzate did also undergo ultrasound venous Doppler of the lower extremities found no DVT was discharged home from the emergency department with oral antibiotics as outlined.       In the interim,  blood cultures returned positive for Enterococcus and she was called to return to the ED for IV abx. Upon arrival to the ED, vital signs were T 98.8F, pulse 87, RR 16, and /87.  White blood cell count was found to be 6.03 with lactate 2.1 and C-reactive protein faintly elevated at 0.84.  Glucose 203 with sodium noted to be 130.  Urinalysis with large leukocytes negative nitrites 6-12 WBCs with 1+ bacteria.  Prior days UA with too numerous to count white blood cells large leukocytes and too numerous to count blood as well (urine was not cultured).      Mrs. Arzate reported left flank pain and fatigue with chills. She reports some left-side abdominal pain with flank pain worse over the past 48 hours. She reported some mild nausea.  She reports she has had some difficulty with bearing weight  on her LLE since her recent CVA.  She denies chest pain, dyspnea, cough, and wheeze.  She has had multiple recent IV and oral antibiotics over the past 90 days.       See below.     **Patient left AMA prior to completion of evaluation and management**               Day of Discharge     HPI:     Mrs. Arzate was admitted and started on IV antibiotics for VRE bacteremia with previously mentioned abscesses.  She left overnight after home percocet was restarted and neurontin reporting upset due to not getting pain medication, more specifically IV diluadid per request.  Dangers of leaving AMA including death from bacteremia were discussed with Mrs. Arzate by Nocturnist provider.   She verbalized understanding and decided to leave AMA starting desire to return to [facility].      Of note, she did receive IV Daptomycin.       **Patient left AMA prior to completion of evaluation and management**    Vital Signs:   Temp:  [98.4 °F (36.9 °C)-98.8 °F (37.1 °C)] 98.4 °F (36.9 °C)  Heart Rate:  [71-87] 77  Resp:  [16-18] 16  BP: (101-150)/(59-99) 101/61     Physical Exam (if applicable):    Not performed secondary to leaving AMA prior to examination.     Pertinent  and/or Most Recent Results     Results from last 7 days   Lab Units 03/21/22  1022 03/20/22  0950   WBC 10*3/mm3 6.03 6.04   HEMOGLOBIN g/dL 10.7* 10.7*   HEMATOCRIT % 30.8* 31.2*   PLATELETS 10*3/mm3 303 306   SODIUM mmol/L 130* 132*   POTASSIUM mmol/L 4.2 4.0   CHLORIDE mmol/L 95* 97*   CO2 mmol/L 22.3 22.1   BUN mg/dL 18 15   CREATININE mg/dL 0.74 0.78   GLUCOSE mg/dL 203* 251*   CALCIUM mg/dL 9.6 9.7     Results from last 7 days   Lab Units 03/21/22  1022 03/20/22  0950   BILIRUBIN mg/dL 0.3 0.2   ALK PHOS U/L 115 116   ALT (SGPT) U/L 10 10   AST (SGOT) U/L 11 13           Invalid input(s): TG, LDLCALC, LDLREALC  Results from last 7 days   Lab Units 03/21/22  1022   HEMOGLOBIN A1C % 5.50     Brief Urine Lab Results  (Last result in the past 365 days)      Color   Clarity    Blood   Leuk Est   Nitrite   Protein   CREAT   Urine HCG        03/21/22 1227 Yellow   Cloudy   Large (3+)   Large (3+)   Negative   30 mg/dL (1+)                 Microbiology Results Abnormal     Procedure Component Value - Date/Time    COVID-19 and FLU A/B PCR - Swab, Nasopharynx [[id number]]  (Normal) Collected: 03/21/22 1056    Lab Status: Final result Specimen: Swab from Nasopharynx Updated: 03/21/22 1125     COVID19 Not Detected     Influenza A PCR Not Detected     Influenza B PCR Not Detected    Narrative:      Fact sheet for providers: https://www.fda.gov/media/437770/download    Fact sheet for patients: https://www.fda.gov/media/841407/download    Test performed by PCR.          Imaging Results (All)     None            Microbiology Results (last 10 days)     Procedure Component Value - Date/Time    COVID-19 and FLU A/B PCR - Swab, Nasopharynx [[id number]]  (Normal) Collected: 03/21/22 1056    Lab Status: Final result Specimen: Swab from Nasopharynx Updated: 03/21/22 1125     COVID19 Not Detected     Influenza A PCR Not Detected     Influenza B PCR Not Detected    Narrative:      Fact sheet for providers: https://www.fda.gov/media/173011/download    Fact sheet for patients: https://www.fda.gov/media/297013/download    Test performed by PCR.    Blood Culture - Blood, Hand, Left [[id number]]  (Normal) Collected: 03/20/22 1016    Lab Status: Preliminary result Specimen: Blood from Hand, Left Updated: 03/21/22 1049     Blood Culture No growth at 24 hours    Blood Culture - Blood, Wrist, Right [[id number]]  (Abnormal) Collected: 03/20/22 1012    Lab Status: Preliminary result Specimen: Blood from Wrist, Right Updated: 03/22/22 0719     Blood Culture Enterococcus species     Comment: Infectious disease consultation is highly recommended.        Isolated from Aerobic Bottle     Gram Stain Aerobic Bottle Gram positive cocci in pairs and chains    Blood Culture ID, PCR - Blood, Wrist, Right [[id number]]  (Abnormal)  Collected: 03/20/22 1012    Lab Status: Final result Specimen: Blood from Wrist, Right Updated: 03/21/22 0601     BCID, PCR Enterococcus faecium. Donita/B (vancomycin resistance gene) detected. Identification by BCID2 PCR.     BOTTLE TYPE Aerobic Bottle    Narrative:      Infectious disease consultation is highly recommended to rule out distant foci of infection.    Wound Culture - Wound, Buttock [[id number]] Collected: 03/13/22 1019    Lab Status: Final result Specimen: Wound from Buttock Updated: 03/15/22 0600     Wound Culture Scant growth (1+) Normal Skin Margo     Gram Stain Rare (1+) WBCs seen      No organisms seen    Urine Culture - Urine, Urine, Catheter [[id number]] Collected: 03/13/22 0934    Lab Status: Final result Specimen: Urine, Catheter Updated: 03/14/22 1147     Urine Culture 25,000 CFU/mL Normal Urogenital Margo    Blood Culture - Blood, Arm, Left [[id number]]  (Normal) Collected: 03/13/22 0928    Lab Status: Final result Specimen: Blood from Arm, Left Updated: 03/18/22 0948     Blood Culture No growth at 5 days    Blood Culture - Blood, Hand, Left [[id number]]  (Normal) Collected: 03/13/22 0928    Lab Status: Final result Specimen: Blood from Hand, Left Updated: 03/18/22 0948     Blood Culture No growth at 5 days                  Pending Labs     Order Current Status    Blood Culture - Blood, Arm, Left In process    Blood Culture - Blood, Arm, Right In process    Urine Culture - Urine, Urine, Clean Catch In process        Discharge Details     Discharge Disposition:  **Patient left AMA prior to completion of evaluation and management, therefore discharge planning remains incomplete including absence of any needed discharging medications, testing arrangements or follow up unless otherwise specified**      Future Appointments   Date Time Provider Department Center   3/22/2022  4:00 PM Eddie Latif APRN [facility]   3/25/2022 12:30 PM Alison Constantino MD [facility]              Electronically signed by Marly Franklin PA-C, 03/22/22, 7:23 AM EDT.

## 2022-03-22 NOTE — NURSING NOTE
"Upon entering the room the patient stated, \"I'm going home.\"   Educated the patient on the risk  of leaving AMA and the benefits of staying for further treatment.  PRICE Shetty notified, Lead Nurse Notified, IV removed at this time.    "

## 2022-03-23 ENCOUNTER — HOSPITAL ENCOUNTER (INPATIENT)
Facility: HOSPITAL | Age: 37
LOS: 1 days | Discharge: LEFT BEFORE DISCHARGE | End: 2022-03-23
Attending: STUDENT IN AN ORGANIZED HEALTH CARE EDUCATION/TRAINING PROGRAM | Admitting: INTERNAL MEDICINE

## 2022-03-23 ENCOUNTER — APPOINTMENT (OUTPATIENT)
Dept: GENERAL RADIOLOGY | Facility: HOSPITAL | Age: 37
End: 2022-03-23

## 2022-03-23 VITALS
HEART RATE: 92 BPM | DIASTOLIC BLOOD PRESSURE: 76 MMHG | HEIGHT: 64 IN | RESPIRATION RATE: 17 BRPM | WEIGHT: 293 LBS | SYSTOLIC BLOOD PRESSURE: 145 MMHG | BODY MASS INDEX: 50.02 KG/M2 | OXYGEN SATURATION: 100 % | TEMPERATURE: 97.7 F

## 2022-03-23 DIAGNOSIS — R78.81 BACTEREMIA: Primary | ICD-10-CM

## 2022-03-23 LAB
ALBUMIN SERPL-MCNC: 3.77 G/DL (ref 3.5–5.2)
ALBUMIN/GLOB SERPL: 1 G/DL
ALP SERPL-CCNC: 111 U/L (ref 39–117)
ALT SERPL W P-5'-P-CCNC: 13 U/L (ref 1–33)
ANION GAP SERPL CALCULATED.3IONS-SCNC: 10.7 MMOL/L (ref 5–15)
APTT PPP: 29 SECONDS (ref 25.5–35.4)
AST SERPL-CCNC: 14 U/L (ref 1–32)
BACTERIA SPEC AEROBE CULT: ABNORMAL
BACTERIA UR QL AUTO: ABNORMAL /HPF
BASOPHILS # BLD AUTO: 0.03 10*3/MM3 (ref 0–0.2)
BASOPHILS NFR BLD AUTO: 0.6 % (ref 0–1.5)
BILIRUB SERPL-MCNC: 0.2 MG/DL (ref 0–1.2)
BILIRUB UR QL STRIP: NEGATIVE
BUN SERPL-MCNC: 15 MG/DL (ref 6–20)
BUN/CREAT SERPL: 20.8 (ref 7–25)
CALCIUM SPEC-SCNC: 9.5 MG/DL (ref 8.6–10.5)
CHLORIDE SERPL-SCNC: 100 MMOL/L (ref 98–107)
CLARITY UR: CLEAR
CO2 SERPL-SCNC: 22.3 MMOL/L (ref 22–29)
COLOR UR: YELLOW
CREAT SERPL-MCNC: 0.72 MG/DL (ref 0.57–1)
D-LACTATE SERPL-SCNC: 2.4 MMOL/L (ref 0.5–2)
D-LACTATE SERPL-SCNC: 2.4 MMOL/L (ref 0.5–2)
DEPRECATED RDW RBC AUTO: 48.2 FL (ref 37–54)
EGFRCR SERPLBLD CKD-EPI 2021: 112 ML/MIN/1.73
EOSINOPHIL # BLD AUTO: 0.12 10*3/MM3 (ref 0–0.4)
EOSINOPHIL NFR BLD AUTO: 2.5 % (ref 0.3–6.2)
ERYTHROCYTE [DISTWIDTH] IN BLOOD BY AUTOMATED COUNT: 14 % (ref 12.3–15.4)
FLUAV SUBTYP SPEC NAA+PROBE: NOT DETECTED
FLUBV RNA ISLT QL NAA+PROBE: NOT DETECTED
GLOBULIN UR ELPH-MCNC: 3.7 GM/DL
GLUCOSE SERPL-MCNC: 227 MG/DL (ref 65–99)
GLUCOSE UR STRIP-MCNC: ABNORMAL MG/DL
GRAM STN SPEC: ABNORMAL
HCG SERPL QL: NEGATIVE
HCT VFR BLD AUTO: 30.1 % (ref 34–46.6)
HGB BLD-MCNC: 10.3 G/DL (ref 12–15.9)
HGB UR QL STRIP.AUTO: ABNORMAL
HOLD SPECIMEN: NORMAL
HYALINE CASTS UR QL AUTO: ABNORMAL /LPF
IMM GRANULOCYTES # BLD AUTO: 0.01 10*3/MM3 (ref 0–0.05)
IMM GRANULOCYTES NFR BLD AUTO: 0.2 % (ref 0–0.5)
INR PPP: 0.96 (ref 0.9–1.1)
ISOLATED FROM: ABNORMAL
KETONES UR QL STRIP: NEGATIVE
LEUKOCYTE ESTERASE UR QL STRIP.AUTO: ABNORMAL
LIPASE SERPL-CCNC: 38 U/L (ref 13–60)
LYMPHOCYTES # BLD AUTO: 1.37 10*3/MM3 (ref 0.7–3.1)
LYMPHOCYTES NFR BLD AUTO: 28.4 % (ref 19.6–45.3)
MCH RBC QN AUTO: 32.3 PG (ref 26.6–33)
MCHC RBC AUTO-ENTMCNC: 34.2 G/DL (ref 31.5–35.7)
MCV RBC AUTO: 94.4 FL (ref 79–97)
MONOCYTES # BLD AUTO: 0.44 10*3/MM3 (ref 0.1–0.9)
MONOCYTES NFR BLD AUTO: 9.1 % (ref 5–12)
NEUTROPHILS NFR BLD AUTO: 2.86 10*3/MM3 (ref 1.7–7)
NEUTROPHILS NFR BLD AUTO: 59.2 % (ref 42.7–76)
NITRITE UR QL STRIP: NEGATIVE
NRBC BLD AUTO-RTO: 0 /100 WBC (ref 0–0.2)
PH UR STRIP.AUTO: 5.5 [PH] (ref 5–8)
PLATELET # BLD AUTO: 241 10*3/MM3 (ref 140–450)
PMV BLD AUTO: 8.3 FL (ref 6–12)
POTASSIUM SERPL-SCNC: 3.8 MMOL/L (ref 3.5–5.2)
PROCALCITONIN SERPL-MCNC: <0.2 NG/ML (ref 0–0.25)
PROT SERPL-MCNC: 7.5 G/DL (ref 6–8.5)
PROT UR QL STRIP: ABNORMAL
PROTHROMBIN TIME: 13.2 SECONDS (ref 12.8–14.5)
QT INTERVAL: 378 MS
QTC INTERVAL: 469 MS
RBC # BLD AUTO: 3.19 10*6/MM3 (ref 3.77–5.28)
RBC # UR STRIP: ABNORMAL /HPF
REF LAB TEST METHOD: ABNORMAL
SARS-COV-2 RNA PNL SPEC NAA+PROBE: NOT DETECTED
SODIUM SERPL-SCNC: 133 MMOL/L (ref 136–145)
SP GR UR STRIP: 1.01 (ref 1–1.03)
SQUAMOUS #/AREA URNS HPF: ABNORMAL /HPF
TROPONIN T SERPL-MCNC: 0.01 NG/ML (ref 0–0.03)
UROBILINOGEN UR QL STRIP: ABNORMAL
WBC # UR STRIP: ABNORMAL /HPF
WBC NRBC COR # BLD: 4.83 10*3/MM3 (ref 3.4–10.8)
WHOLE BLOOD HOLD SPECIMEN: NORMAL

## 2022-03-23 PROCEDURE — 84484 ASSAY OF TROPONIN QUANT: CPT | Performed by: STUDENT IN AN ORGANIZED HEALTH CARE EDUCATION/TRAINING PROGRAM

## 2022-03-23 PROCEDURE — 85610 PROTHROMBIN TIME: CPT | Performed by: STUDENT IN AN ORGANIZED HEALTH CARE EDUCATION/TRAINING PROGRAM

## 2022-03-23 PROCEDURE — 85730 THROMBOPLASTIN TIME PARTIAL: CPT | Performed by: STUDENT IN AN ORGANIZED HEALTH CARE EDUCATION/TRAINING PROGRAM

## 2022-03-23 PROCEDURE — 93005 ELECTROCARDIOGRAM TRACING: CPT | Performed by: STUDENT IN AN ORGANIZED HEALTH CARE EDUCATION/TRAINING PROGRAM

## 2022-03-23 PROCEDURE — 87040 BLOOD CULTURE FOR BACTERIA: CPT | Performed by: STUDENT IN AN ORGANIZED HEALTH CARE EDUCATION/TRAINING PROGRAM

## 2022-03-23 PROCEDURE — 36415 COLL VENOUS BLD VENIPUNCTURE: CPT

## 2022-03-23 PROCEDURE — 71045 X-RAY EXAM CHEST 1 VIEW: CPT | Performed by: RADIOLOGY

## 2022-03-23 PROCEDURE — 80053 COMPREHEN METABOLIC PANEL: CPT | Performed by: STUDENT IN AN ORGANIZED HEALTH CARE EDUCATION/TRAINING PROGRAM

## 2022-03-23 PROCEDURE — 85025 COMPLETE CBC W/AUTO DIFF WBC: CPT | Performed by: STUDENT IN AN ORGANIZED HEALTH CARE EDUCATION/TRAINING PROGRAM

## 2022-03-23 PROCEDURE — 71045 X-RAY EXAM CHEST 1 VIEW: CPT

## 2022-03-23 PROCEDURE — 99284 EMERGENCY DEPT VISIT MOD MDM: CPT

## 2022-03-23 PROCEDURE — 84703 CHORIONIC GONADOTROPIN ASSAY: CPT | Performed by: STUDENT IN AN ORGANIZED HEALTH CARE EDUCATION/TRAINING PROGRAM

## 2022-03-23 PROCEDURE — 84145 PROCALCITONIN (PCT): CPT | Performed by: STUDENT IN AN ORGANIZED HEALTH CARE EDUCATION/TRAINING PROGRAM

## 2022-03-23 PROCEDURE — 83690 ASSAY OF LIPASE: CPT | Performed by: STUDENT IN AN ORGANIZED HEALTH CARE EDUCATION/TRAINING PROGRAM

## 2022-03-23 PROCEDURE — 87636 SARSCOV2 & INF A&B AMP PRB: CPT | Performed by: STUDENT IN AN ORGANIZED HEALTH CARE EDUCATION/TRAINING PROGRAM

## 2022-03-23 PROCEDURE — 81001 URINALYSIS AUTO W/SCOPE: CPT | Performed by: STUDENT IN AN ORGANIZED HEALTH CARE EDUCATION/TRAINING PROGRAM

## 2022-03-23 PROCEDURE — 99284 EMERGENCY DEPT VISIT MOD MDM: CPT | Performed by: INTERNAL MEDICINE

## 2022-03-23 PROCEDURE — 83605 ASSAY OF LACTIC ACID: CPT | Performed by: STUDENT IN AN ORGANIZED HEALTH CARE EDUCATION/TRAINING PROGRAM

## 2022-03-23 RX ORDER — NICOTINE POLACRILEX 4 MG
15 LOZENGE BUCCAL
Status: CANCELLED | OUTPATIENT
Start: 2022-03-23

## 2022-03-23 RX ORDER — NITROGLYCERIN 0.4 MG/1
0.4 TABLET SUBLINGUAL
Status: CANCELLED | OUTPATIENT
Start: 2022-03-23

## 2022-03-23 RX ORDER — METOPROLOL SUCCINATE 25 MG/1
25 TABLET, EXTENDED RELEASE ORAL NIGHTLY
Status: CANCELLED | OUTPATIENT
Start: 2022-03-23

## 2022-03-23 RX ORDER — AMLODIPINE BESYLATE 5 MG/1
10 TABLET ORAL DAILY
Status: CANCELLED | OUTPATIENT
Start: 2022-03-23 | End: 2023-02-16

## 2022-03-23 RX ORDER — OXYCODONE HYDROCHLORIDE 5 MG/1
10 TABLET ORAL EVERY 4 HOURS PRN
Status: CANCELLED | OUTPATIENT
Start: 2022-03-23 | End: 2022-03-30

## 2022-03-23 RX ORDER — SODIUM CHLORIDE 0.9 % (FLUSH) 0.9 %
10 SYRINGE (ML) INJECTION AS NEEDED
Status: DISCONTINUED | OUTPATIENT
Start: 2022-03-23 | End: 2022-03-23 | Stop reason: HOSPADM

## 2022-03-23 RX ORDER — LANOLIN ALCOHOL/MO/W.PET/CERES
2000 CREAM (GRAM) TOPICAL DAILY
Status: CANCELLED | OUTPATIENT
Start: 2022-03-23

## 2022-03-23 RX ORDER — DULOXETIN HYDROCHLORIDE 20 MG/1
20 CAPSULE, DELAYED RELEASE ORAL DAILY
Status: CANCELLED | OUTPATIENT
Start: 2022-03-23

## 2022-03-23 RX ORDER — SODIUM CHLORIDE 0.9 % (FLUSH) 0.9 %
10 SYRINGE (ML) INJECTION EVERY 12 HOURS SCHEDULED
Status: CANCELLED | OUTPATIENT
Start: 2022-03-23

## 2022-03-23 RX ORDER — ACETAMINOPHEN 325 MG/1
650 TABLET ORAL EVERY 6 HOURS PRN
Status: CANCELLED | OUTPATIENT
Start: 2022-03-23

## 2022-03-23 RX ORDER — GABAPENTIN 300 MG/1
300 CAPSULE ORAL 2 TIMES DAILY
Status: CANCELLED | OUTPATIENT
Start: 2022-03-23

## 2022-03-23 RX ORDER — ONDANSETRON 4 MG/1
4 TABLET, ORALLY DISINTEGRATING ORAL EVERY 8 HOURS PRN
Status: CANCELLED | OUTPATIENT
Start: 2022-03-23

## 2022-03-23 RX ORDER — PROMETHAZINE HYDROCHLORIDE 25 MG/1
25 TABLET ORAL EVERY 6 HOURS PRN
Status: CANCELLED | OUTPATIENT
Start: 2022-03-23

## 2022-03-23 RX ORDER — ACETAMINOPHEN 500 MG
1000 TABLET ORAL ONCE
Status: COMPLETED | OUTPATIENT
Start: 2022-03-23 | End: 2022-03-23

## 2022-03-23 RX ORDER — DEXTROSE MONOHYDRATE 25 G/50ML
25 INJECTION, SOLUTION INTRAVENOUS
Status: CANCELLED | OUTPATIENT
Start: 2022-03-23

## 2022-03-23 RX ORDER — ONDANSETRON 2 MG/ML
4 INJECTION INTRAMUSCULAR; INTRAVENOUS EVERY 4 HOURS PRN
Status: CANCELLED | OUTPATIENT
Start: 2022-03-23

## 2022-03-23 RX ORDER — SODIUM CHLORIDE 0.9 % (FLUSH) 0.9 %
10 SYRINGE (ML) INJECTION AS NEEDED
Status: CANCELLED | OUTPATIENT
Start: 2022-03-23

## 2022-03-23 RX ADMIN — ACETAMINOPHEN 1000 MG: 500 TABLET ORAL at 12:42

## 2022-03-23 NOTE — ED NOTES
Pt states she wants to leave AMA. She states that she doesn't have anyone to watch her kids at home if she gets admitted. Dr. Mccoy notified and went to bedside to discuss potential benefits of services offered and potential risks of refusing services including deterioration of condition and possibly even death. Pt is ALOx4 at this time and verbalizes understanding and states that she still wishes to leave.

## 2022-03-23 NOTE — H&P
[facility]   HISTORY AND PHYSICAL    Patient Name: Natalia Arzate  : 1985  MRN: [id number]  Primary Care Physician:  Eddie Latif APRN  Date of admission: 3/23/2022    Subjective   Subjective     Chief Complaint: left flank pain    History of Present Illness  Pt is a 35yo WF who presents w/ c/o flank pain.  Pt has had a complicated course over the past 4 months -> had protracted admission to [facility] (-) for retroperitoneal infection.  She has since had course complicated by recurrent left perinephric abcess -> admitted to  3/15-3/18 and had drain replaced and was dc'd home.  She came to [facility] ED on 3/18 and had cultures drawn and was dc'd w/ po cefdinir.  Pt was subsequently called back on 3/20 after blood cx came back +.  She was admitted for parenteral abx but then elected to sign out AMA due to complaint that pain was poorly controlled.    Pt states that she returns today due to persistent pain w/ associated nausea without emesis.  Pt denies any F/C, diarrhea, cough/dyspnea but does note some dysuria without gross hematuria.  Pt requested pain medications during my assessment and requested she have pain medication ordered for her admission.    Review of Systems   Constitutional: Negative for chills and fatigue.   HENT: Negative for nosebleeds and sore throat.    Eyes: Negative for discharge and visual disturbance.   Respiratory: Negative for cough and shortness of breath.    Cardiovascular: Negative for chest pain and palpitations.   Gastrointestinal: Positive for nausea. Negative for diarrhea and vomiting.   Genitourinary: Positive for dysuria. Negative for hematuria.   Musculoskeletal: Negative for arthralgias.   Skin: Negative for rash.   Neurological: Negative for syncope and light-headedness.   Psychiatric/Behavioral: Negative for confusion.        Personal History     Past Medical History:   Diagnosis Date   • A-fib (HCC)    • Abnormal ECG    • Anemia    • Anxiety     • Asthma    • Cancer (HCC)     Ovarian   • Depression    • Diabetes mellitus (HCC)    • DVT (deep venous thrombosis) (HCC)    • Factor 5 Leiden mutation, heterozygous (HCC)    • Fibroid    • GERD (gastroesophageal reflux disease)    • Gout    • H/O abdominal abscess    • Hyperlipidemia    • Hypothyroid    • Kidney stone    • Migraines    • Neuropathy    • Ovarian cancer (HCC) 2021   • Ovarian cyst    • PE (pulmonary embolism)    • Polycystic ovary syndrome    • Preeclampsia    • Rh incompatibility    • Stroke (HCC)    • TIA (transient ischemic attack)    • Urinary tract infection    • Varicella        Past Surgical History:   Procedure Laterality Date   • ABDOMINAL SURGERY     • CARDIAC CATHETERIZATION     •  SECTION     • CHOLECYSTECTOMY     • COLONOSCOPY     • ENDOSCOPY     • EXTRACORPOREAL SHOCK WAVE LITHOTRIPSY (ESWL) Left 10/22/2021    Procedure: EXTRACORPOREAL SHOCKWAVE LITHOTRIPSY;  Surgeon: Milan Motley MD;  Location: [facility];  Service: Urology;  Laterality: Left;   • LITHOTRIPSY     • RIGHT OOPHORECTOMY     • URETEROSCOPY LASER LITHOTRIPSY WITH STENT INSERTION Left 10/01/2021    Procedure: URETEROSCOPY WITH STENT PLACEMENT;  Surgeon: Milan Motley MD;  Location: [facility];  Service: Urology;  Laterality: Left;       Family History: family history includes Diabetes in her father; Heart attack in her father; Hypertension in her father and mother; No Known Problems in her brother, cousin, daughter, maternal grandfather, maternal grandmother, paternal grandfather, paternal grandmother, sister, and son. Otherwise pertinent FHx was reviewed and not pertinent to current issue.    Social History:  reports that she has never smoked. She has never used smokeless tobacco. She reports that she does not drink alcohol and does not use drugs.    Home Medications:  DULoxetine, Semaglutide(0.25 or 0.5MG/DOS), amLODIPine, apixaban, cefdinir, cyanocobalamin, gabapentin, insulin glargine,  metoprolol succinate XL, naloxone, ondansetron ODT, promethazine, and vitamin D    Allergies:  Allergies   Allergen Reactions   • Toradol [Ketorolac Tromethamine] Anaphylaxis and Hives   • Haldol [Haloperidol] Hives and Mental Status Change   • Tramadol Hives and Swelling   • Amoxicillin Hives and Rash   • Penicillins Hives and Rash       Objective    Objective     Vitals:   Temp:  [97.8 °F (36.6 °C)] 97.8 °F (36.6 °C)  Heart Rate:  [102] 102  Resp:  [18] 18  BP: (149)/(104) 149/104    Physical Exam  Constitutional:       General: She is not in acute distress.     Appearance: She is obese.   HENT:      Mouth/Throat:      Mouth: Mucous membranes are moist.   Eyes:      General: No scleral icterus.  Cardiovascular:      Rate and Rhythm: Normal rate and regular rhythm.   Pulmonary:      Effort: Pulmonary effort is normal. No respiratory distress.      Breath sounds: Normal breath sounds. No wheezing or rhonchi.   Abdominal:      General: Bowel sounds are normal.      Palpations: Abdomen is soft.      Tenderness: There is no abdominal tenderness. There is left CVA tenderness.   Skin:     General: Skin is warm and dry.      Comments: Left flank drain noted w/ dressing c/d/i   Neurological:      Mental Status: She is alert and oriented to person, place, and time.      Cranial Nerves: Cranial nerve deficit (+ disconjugate gaze w/ left eye deviated laterally) present.         Result Review    Result Review:  I have personally reviewed the results from the time of this admission to 3/23/2022 15:36 EDT and agree with these findings:  [x]  Laboratory  [x]  Microbiology  []  Radiology  [x]  EKG/Telemetry   []  Cardiology/Vascular   []  Pathology  []  Old records  []  Other:  Most notable findings include: VRE bacteremia    Assessment/Plan   Assessment / Plan     Brief Patient Summary:  Natalia Arzate is a 36 y.o. female who returns due to pain w/ left flank drain for perinephric abcess and known bacteremia.    Active Hospital  Problems:  Active Hospital Problems    Diagnosis    • Bacteremia      Plan:   1. VRE bacteremia  - blood cx: (3/20) 1/4 + VRE, (3/21) NGTD, (3/23) pending  - urine cx: (3/21) 50k cfu normal urogenital hafsa  - abx: dapto (3/23-)  - pt was on dapto last hospitalization, will resume  - ID consult    2. H/o VTE/hypercoagulable state  - continue DOAC    3. DM II  - hold oral hypoglycemics  - basal insulin + SSI    4. HTN  - continue home metropolol/amlodipine    5. Flank pain  - tolerating po intake  - po oxy ir prn for pain    6. PPx  - DOAC    DVT prophylaxis:  No DVT prophylaxis order currently exists.    CODE STATUS:    Code Status (Patient has no pulse and is not breathing): CPR (Attempt to Resuscitate)  Medical Interventions (Patient has pulse or is breathing): Full Support    Admission Status:  I believe this patient meets inpatient status.    Howard Khalil MD

## 2022-03-23 NOTE — ED PROVIDER NOTES
Subjective   36-year-old female with past medical history of A. fib, anxiety, diabetes, and hypertension with a left-sided nephrostomy tube presents to the ER due to concerns for persistent left-sided flank pain.  Patient was recently admitted to the hospital due to concerns for VRE positive blood cultures.  Patient ended up leaving AGAINST MEDICAL ADVICE and has returned to the ER with persistent left-sided flank pain.  CT imaging from approximately 48 hours prior to this presentation did not note any significant abnormalities or acute changes.  Patient has had no significant changes in urinary output.  Patient endorses persistent and severe left-sided flank pain.  No significant nausea or vomiting.  No chest pain.  Vitals stable          Review of Systems   Genitourinary: Positive for flank pain.   All other systems reviewed and are negative.      Past Medical History:   Diagnosis Date   • A-fib (HCC)    • Abnormal ECG    • Anemia    • Anxiety    • Asthma    • Cancer (HCC)     Ovarian   • Depression    • Diabetes mellitus (HCC)    • DVT (deep venous thrombosis) (HCC)    • Factor 5 Leiden mutation, heterozygous (HCC)    • Fibroid    • GERD (gastroesophageal reflux disease)    • Gout    • H/O abdominal abscess    • Hyperlipidemia    • Hypothyroid    • Kidney stone    • Migraines    • Neuropathy    • Ovarian cancer (HCC) 2021   • Ovarian cyst    • PE (pulmonary embolism)    • Polycystic ovary syndrome    • Preeclampsia    • Rh incompatibility    • Stroke (HCC)    • TIA (transient ischemic attack)    • Urinary tract infection    • Varicella        Allergies   Allergen Reactions   • Toradol [Ketorolac Tromethamine] Anaphylaxis and Hives   • Haldol [Haloperidol] Hives and Mental Status Change   • Tramadol Hives and Swelling   • Amoxicillin Hives and Rash   • Penicillins Hives and Rash       Past Surgical History:   Procedure Laterality Date   • ABDOMINAL SURGERY     • CARDIAC CATHETERIZATION     •  SECTION    "  • CHOLECYSTECTOMY     • COLONOSCOPY     • ENDOSCOPY     • EXTRACORPOREAL SHOCK WAVE LITHOTRIPSY (ESWL) Left 10/22/2021    Procedure: EXTRACORPOREAL SHOCKWAVE LITHOTRIPSY;  Surgeon: Milan Motley MD;  Location: [facility];  Service: Urology;  Laterality: Left;   • LITHOTRIPSY     • RIGHT OOPHORECTOMY     • URETEROSCOPY LASER LITHOTRIPSY WITH STENT INSERTION Left 10/01/2021    Procedure: URETEROSCOPY WITH STENT PLACEMENT;  Surgeon: Milan Motley MD;  Location: [facility];  Service: Urology;  Laterality: Left;       Family History   Problem Relation Age of Onset   • Hypertension Mother    • Diabetes Father    • Hypertension Father    • Heart attack Father    • No Known Problems Sister    • No Known Problems Brother    • No Known Problems Son    • No Known Problems Daughter    • No Known Problems Maternal Grandmother    • No Known Problems Maternal Grandfather    • No Known Problems Paternal Grandmother    • No Known Problems Paternal Grandfather    • No Known Problems Cousin    • Rheum arthritis Neg Hx    • Osteoarthritis Neg Hx    • Asthma Neg Hx    • Heart failure Neg Hx    • Hyperlipidemia Neg Hx    • Migraines Neg Hx    • Rashes / Skin problems Neg Hx    • Seizures Neg Hx    • Stroke Neg Hx    • Thyroid disease Neg Hx        Social History     Socioeconomic History   • Marital status:    Tobacco Use   • Smoking status: Never Smoker   • Smokeless tobacco: Never Used   Vaping Use   • Vaping Use: Never used   Substance and Sexual Activity   • Alcohol use: No   • Drug use: Never     Comment: Pt has track marks on right arm. Pt states \"It's from my INR being drawn.\"    • Sexual activity: Not Currently     Partners: Male     Birth control/protection: None           Objective   Physical Exam  Constitutional:       General: She is not in acute distress.     Appearance: Normal appearance. She is not ill-appearing.   HENT:      Head: Normocephalic and atraumatic.      Right Ear: External ear " normal.      Left Ear: External ear normal.      Nose: Nose normal.      Mouth/Throat:      Mouth: Mucous membranes are moist.   Eyes:      Extraocular Movements: Extraocular movements intact.      Pupils: Pupils are equal, round, and reactive to light.   Cardiovascular:      Rate and Rhythm: Normal rate and regular rhythm.      Heart sounds: No murmur heard.  Pulmonary:      Effort: Pulmonary effort is normal. No respiratory distress.      Breath sounds: Normal breath sounds. No wheezing.   Abdominal:      General: Bowel sounds are normal.      Palpations: Abdomen is soft.      Tenderness: There is no abdominal tenderness. There is left CVA tenderness.   Musculoskeletal:         General: No deformity or signs of injury. Normal range of motion.      Cervical back: Normal range of motion and neck supple.   Skin:     General: Skin is warm and dry.      Findings: No erythema.   Neurological:      General: No focal deficit present.      Mental Status: She is alert and oriented to person, place, and time. Mental status is at baseline.      Cranial Nerves: No cranial nerve deficit.   Psychiatric:         Mood and Affect: Mood normal.         Behavior: Behavior normal.         Thought Content: Thought content normal.         Procedures           ED Course  ED Course as of 03/23/22 1554   Wed Mar 23, 2022   1327 EKG noted sinus rhythm.  93 bpm.  .  QRS 96.  QTc 469.  No acute ST elevation. [SF]   1446 Repeat lab work notes no significant changes.  Urinalysis concerns for UTI.  This is consistent with her recent admission diagnosis.  CBC and CCP remain unremarkable.  CT of the abdomen pelvis from 48 hours prior to this presentation notes no significant abnormalities.  Chest x-ray unremarkable.  Patient will be treated with daptomycin based off her previous clinical course for VRE bacteremia.  Recommend admission for further work up and treatment.  Hospitalist team consulted and made aware of the patient.  Consults and  orders placed per hospitalist request.  Patient was agreeable to admission plan.  Vitals stable on admission. [SF]   1552 Patient informed the staff that they wished to leave against medical advice.  The risks of this decision were discussed throughly with the patient.  The risks included, but were not limited to worsening medical status, sepsis, shock, respiratory failure, severe neurological deficit, and cardiac death.  The patient expressed full understanding of the risk of this decision.  Patient was counseled to immediately return to the ER if symptoms worsened, and to follow up with their PCP promptly.  Vitals guarded at AMA. [SF]      ED Course User Index  [SF] David Mccoy DO                                                 [facility]    Final diagnoses:   Bacteremia       ED Disposition  ED Disposition     ED Disposition   AMA    Condition   --    Comment   Level of Care: Telemetry [5]  Diagnosis: Bacteremia [790.7.ICD-9-CM]  Certification: I Certify That Inpatient Hospital Services Are Medically Necessary For Greater Than 2 Midnights               Eddie Latif, APRN  [address] [facility] [address]  [phone]    In 1 week      [facility] Emergency Department  [address] [place] [id number] [phone]    If symptoms worsen         Medication List      Changed    cefdinir 300 MG capsule  Commonly known as: OMNICEF  Take 1 capsule by mouth 2 (Two) Times a Day.  What changed: additional instructions             David Mccoy DO  03/23/22 1448       David Mccoy DO  03/23/22 4970

## 2022-03-25 ENCOUNTER — TELEMEDICINE (OUTPATIENT)
Dept: FAMILY MEDICINE CLINIC | Facility: CLINIC | Age: 37
End: 2022-03-25

## 2022-03-25 DIAGNOSIS — Z79.4 TYPE 2 DIABETES MELLITUS WITH HYPERGLYCEMIA, WITH LONG-TERM CURRENT USE OF INSULIN: Chronic | ICD-10-CM

## 2022-03-25 DIAGNOSIS — E55.9 VITAMIN D DEFICIENCY: ICD-10-CM

## 2022-03-25 DIAGNOSIS — E03.9 HYPOTHYROIDISM, UNSPECIFIED TYPE: Chronic | ICD-10-CM

## 2022-03-25 DIAGNOSIS — E11.65 TYPE 2 DIABETES MELLITUS WITH HYPERGLYCEMIA, WITH LONG-TERM CURRENT USE OF INSULIN: Chronic | ICD-10-CM

## 2022-03-25 DIAGNOSIS — E53.8 VITAMIN B 12 DEFICIENCY: ICD-10-CM

## 2022-03-25 DIAGNOSIS — M10.49 OTHER SECONDARY GOUT, MULTIPLE SITES, UNSPECIFIED CHRONICITY: ICD-10-CM

## 2022-03-25 DIAGNOSIS — L89.156 PRESSURE INJURY OF DEEP TISSUE OF SACRAL REGION: ICD-10-CM

## 2022-03-25 DIAGNOSIS — I10 PRIMARY HYPERTENSION: Chronic | ICD-10-CM

## 2022-03-25 DIAGNOSIS — D68.51 FACTOR 5 LEIDEN MUTATION, HETEROZYGOUS: Chronic | ICD-10-CM

## 2022-03-25 DIAGNOSIS — N15.1 PERINEPHRIC ABSCESS: ICD-10-CM

## 2022-03-25 DIAGNOSIS — E78.5 DYSLIPIDEMIA: Chronic | ICD-10-CM

## 2022-03-25 DIAGNOSIS — R78.81 BACTEREMIA: Primary | ICD-10-CM

## 2022-03-25 LAB — BACTERIA SPEC AEROBE CULT: NORMAL

## 2022-03-25 PROCEDURE — 99214 OFFICE O/P EST MOD 30 MIN: CPT | Performed by: NURSE PRACTITIONER

## 2022-03-25 NOTE — PROGRESS NOTES
You have chosen to receive care through a telehealth visit.  Do you consent to use a video/audio connection for your medical care today? Yes    History of Present Illness  Natalia Arzate is a 36 y.o. female who presents to the clinic today via video. She is sitting at home. She was hospitalized at [facility] after a very lengthy hospitalization from 12/26/2021 until she was discharged on 2/15/2022.  She was admitted for a disseminated infection.  She presented emergently at [facility] on 12/26/2021 following a fall at home. She was transferred to . She was  diagnosed with L Pyelonephritis complicated by multiple abscesses as well as superinfection of L iliopsoas hematoma on admission at [facility]. During admission, she developed acute hypoxic respiratory failure presumed due to aspiration and septic shock. She was transferred to ICU. She was intubated and required CRRT/HD while in ICU. Eventually transferred to the floor no longer requiring HD. During hospitalization, she had an acute/subacute R cerebellar ischemic stroke on 1/26/2022 with recommendation to continue on Anticoagulation.   Natalia has been diagnosed with Factor V Leiden, DM, type 2 with neuropathy currently treated with insulin, HTN, and PTSD. In addition, she has HTN, Dyslipidemia, Hypothyroidism, Renal Stones and Gout.     She does have Home Health services including Skilled Nursing on Fridays and  Physical and Occupational Therapy twice weekly. She feels this is helping.  Her daughter is doing her sacral pressure injury wound and she shares it is improving.     Since her last visit 3/10/2022, she has had multiple ED visits and was diagnosed with Bacteremia which was VRE positive. She was to be admitted to the hospital but informed the staff she was leaving against medical advice.  The risks of this decision were discussed thoroughly with the patient. She expressed full understanding of the risk of this decision. And was counseled  "to immediately return to the ER if symptoms worsened,     Diabetes  She has type 2 diabetes mellitus treated with insulin both basal and fast acting. Recently, started using a DexCom which is helping her.  Risk factors for coronary artery disease include diabetes mellitus, dyslipidemia, hypertension, obesity, stress and sedentary lifestyle. Last A1C was 8.7 on 12/27/2021    Hypertension  Currently taking Amlodipine and reports her bp has been elevated. Had previously been on Metoprolol as well.   Lab Results   Component Value Date    GLUCOSE 227 (H) 03/23/2022    BUN 15 03/23/2022    CREATININE 0.72 03/23/2022    EGFRIFNONA 82 02/25/2022    BCR 20.8 03/23/2022    K 3.8 03/23/2022    CO2 22.3 03/23/2022    CALCIUM 9.5 03/23/2022    ALBUMIN 3.77 03/23/2022    AST 14 03/23/2022    ALT 13 03/23/2022     Dyslipidemia  Prescribed Omega     Hypothyroidism    Lab Results   Component Value Date    TSH 3.780 03/13/2022       Kidney Disease  Previous Hydronephrosis and obstructing stone. Previous and current stent. Treated by Dr Motley and  Medical group. She was last seen by Dr Motley on 3/15/2022.    The following portions of the patient's history were reviewed and updated as appropriate: allergies, current medications, past family history, past medical history, past social history, past surgical history and problem list.    Review of Systems   Constitutional: Positive for activity change (Improving), appetite change (Improving) and fatigue. Negative for chills, diaphoresis and fever.   Respiratory: Negative for cough, shortness of breath and wheezing.    Gastrointestinal: Positive for abdominal pain.   Musculoskeletal: Positive for arthralgias and gait problem (Improving).   Skin: Positive for wound (Sacral pressure wound).   Neurological: Positive for weakness.     Vital signs:  Ht 162.6 cm (64\")   Wt 115 kg (253 lb)     BMI 43.43 kg/m²     Physical Exam  Constitutional:       General: She is not in acute " distress.     Appearance: She is not ill-appearing.   HENT:      Head: Normocephalic.      Nose: Nose normal.   Eyes:      General: No scleral icterus.        Right eye: No discharge.         Left eye: No discharge.   Pulmonary:      Effort: Pulmonary effort is normal. No respiratory distress.   Musculoskeletal:      Cervical back: Neck supple.   Neurological:      Mental Status: She is alert.   Psychiatric:         Mood and Affect: Mood and affect normal.         Speech: Speech normal.         Behavior: Behavior is cooperative.         Cognition and Memory: Cognition and memory normal.       Result Review : Noted  admission on 3/15/2022, ED visits 3/20, 3/21 and 3/23/2022 with notes/labs/ imaging.   Patient's Body mass index is 43.43 kg/m². indicating that she is obese (BMI >30). Obesity-related health conditions include the following: hypertension, diabetes mellitus and dyslipidemias. Obesity is unchanged. BMI is is above average; BMI management plan is completed. We discussed portion control and increasing exercise..     Assessment/Plan     Diagnoses and all orders for this visit:    1. Bacteremia (Primary)  Comments:  Reinforced the risks of her decision were discussed throughly with Natalia and encouraged her to return to [facility] or [facility].      2. Type 2 diabetes mellitus with hyperglycemia, with long-term current use of insulin (HCC)  Comments:  Will add Ozempic to her regimen. Continue Lantus    3. Dyslipidemia  Comments:  Lipid panel ordered for       4. Primary hypertension  Comments:  Continue Metoprolol. Will add ACEI/ARB when renal issues are resolved     5. Hypothyroidism, unspecified type  Comments:  TSH ordered     6. Factor 5 Leiden mutation, heterozygous (HCC)  Comments:  F/U with Hematologist. Continue Eliquis     7. Perinephric abscess  Comments:  F/U with  medical team    8. Pressure injury of deep tissue of sacral region  Comments:  Will have  to check     9. Vitamin B 12  deficiency  Comments:  Daily supplement    10. Vitamin D deficiency  Comments:  Weekly Vit D    Follow Up In April sooner if problems/concerns occur  Findings and recommendations discussed with Natalia. Reviewed test results. S/S of concern reviewed and to seek immediate medical evaluation if occur or if symptoms worsen.  Will have HH to follow up and check her sacral pressure ulcer and have labs done.     This document has been electronically signed by:

## 2022-03-26 LAB
BACTERIA SPEC AEROBE CULT: NORMAL
BACTERIA SPEC AEROBE CULT: NORMAL

## 2022-03-27 VITALS — HEIGHT: 64 IN | WEIGHT: 253 LBS | BODY MASS INDEX: 43.19 KG/M2

## 2022-03-27 PROBLEM — N15.1 RENAL AND PERINEPHRIC ABSCESS: Status: ACTIVE | Noted: 2022-03-27

## 2022-03-28 ENCOUNTER — APPOINTMENT (OUTPATIENT)
Dept: ULTRASOUND IMAGING | Facility: HOSPITAL | Age: 37
End: 2022-03-28

## 2022-03-28 ENCOUNTER — APPOINTMENT (OUTPATIENT)
Dept: CT IMAGING | Facility: HOSPITAL | Age: 37
End: 2022-03-28

## 2022-03-28 ENCOUNTER — HOSPITAL ENCOUNTER (EMERGENCY)
Facility: HOSPITAL | Age: 37
Discharge: HOME | End: 2022-03-28
Attending: EMERGENCY MEDICINE | Admitting: EMERGENCY MEDICINE

## 2022-03-28 VITALS
RESPIRATION RATE: 18 BRPM | HEIGHT: 64 IN | DIASTOLIC BLOOD PRESSURE: 98 MMHG | BODY MASS INDEX: 43.19 KG/M2 | OXYGEN SATURATION: 97 % | HEART RATE: 92 BPM | TEMPERATURE: 98.3 F | SYSTOLIC BLOOD PRESSURE: 154 MMHG | WEIGHT: 253 LBS

## 2022-03-28 DIAGNOSIS — G89.29 CHRONIC LEFT FLANK PAIN: Primary | ICD-10-CM

## 2022-03-28 DIAGNOSIS — R10.9 CHRONIC LEFT FLANK PAIN: Primary | ICD-10-CM

## 2022-03-28 LAB
ALBUMIN SERPL-MCNC: 4.09 G/DL (ref 3.5–5.2)
ALBUMIN/GLOB SERPL: 1 G/DL
ALP SERPL-CCNC: 120 U/L (ref 39–117)
ALT SERPL W P-5'-P-CCNC: 16 U/L (ref 1–33)
ANION GAP SERPL CALCULATED.3IONS-SCNC: 12.2 MMOL/L (ref 5–15)
AST SERPL-CCNC: 14 U/L (ref 1–32)
BACTERIA SPEC AEROBE CULT: NORMAL
BACTERIA SPEC AEROBE CULT: NORMAL
BACTERIA UR QL AUTO: ABNORMAL /HPF
BASOPHILS # BLD AUTO: 0.03 10*3/MM3 (ref 0–0.2)
BASOPHILS NFR BLD AUTO: 0.5 % (ref 0–1.5)
BILIRUB SERPL-MCNC: 0.3 MG/DL (ref 0–1.2)
BILIRUB UR QL STRIP: NEGATIVE
BUN SERPL-MCNC: 16 MG/DL (ref 6–20)
BUN/CREAT SERPL: 23.9 (ref 7–25)
CALCIUM SPEC-SCNC: 9.8 MG/DL (ref 8.6–10.5)
CHLORIDE SERPL-SCNC: 97 MMOL/L (ref 98–107)
CLARITY UR: ABNORMAL
CO2 SERPL-SCNC: 21.8 MMOL/L (ref 22–29)
COLOR UR: YELLOW
CREAT SERPL-MCNC: 0.67 MG/DL (ref 0.57–1)
CRP SERPL-MCNC: 0.81 MG/DL (ref 0–0.5)
D-LACTATE SERPL-SCNC: 2.6 MMOL/L (ref 0.5–2)
D-LACTATE SERPL-SCNC: 2.8 MMOL/L (ref 0.5–2)
DEPRECATED RDW RBC AUTO: 45.6 FL (ref 37–54)
EGFRCR SERPLBLD CKD-EPI 2021: 117.1 ML/MIN/1.73
EOSINOPHIL # BLD AUTO: 0.18 10*3/MM3 (ref 0–0.4)
EOSINOPHIL NFR BLD AUTO: 3.1 % (ref 0.3–6.2)
ERYTHROCYTE [DISTWIDTH] IN BLOOD BY AUTOMATED COUNT: 13.7 % (ref 12.3–15.4)
FLUAV RNA RESP QL NAA+PROBE: NOT DETECTED
FLUBV RNA RESP QL NAA+PROBE: NOT DETECTED
GLOBULIN UR ELPH-MCNC: 3.9 GM/DL
GLUCOSE SERPL-MCNC: 184 MG/DL (ref 65–99)
GLUCOSE UR STRIP-MCNC: ABNORMAL MG/DL
HCT VFR BLD AUTO: 32.9 % (ref 34–46.6)
HGB BLD-MCNC: 11.6 G/DL (ref 12–15.9)
HGB UR QL STRIP.AUTO: ABNORMAL
HOLD SPECIMEN: NORMAL
HOLD SPECIMEN: NORMAL
HYALINE CASTS UR QL AUTO: ABNORMAL /LPF
IMM GRANULOCYTES # BLD AUTO: 0.02 10*3/MM3 (ref 0–0.05)
IMM GRANULOCYTES NFR BLD AUTO: 0.3 % (ref 0–0.5)
KETONES UR QL STRIP: NEGATIVE
LEUKOCYTE ESTERASE UR QL STRIP.AUTO: ABNORMAL
LYMPHOCYTES # BLD AUTO: 1.87 10*3/MM3 (ref 0.7–3.1)
LYMPHOCYTES NFR BLD AUTO: 32.6 % (ref 19.6–45.3)
MCH RBC QN AUTO: 32.1 PG (ref 26.6–33)
MCHC RBC AUTO-ENTMCNC: 35.3 G/DL (ref 31.5–35.7)
MCV RBC AUTO: 91.1 FL (ref 79–97)
MONOCYTES # BLD AUTO: 0.48 10*3/MM3 (ref 0.1–0.9)
MONOCYTES NFR BLD AUTO: 8.4 % (ref 5–12)
NEUTROPHILS NFR BLD AUTO: 3.16 10*3/MM3 (ref 1.7–7)
NEUTROPHILS NFR BLD AUTO: 55.1 % (ref 42.7–76)
NITRITE UR QL STRIP: NEGATIVE
NRBC BLD AUTO-RTO: 0 /100 WBC (ref 0–0.2)
PH UR STRIP.AUTO: 6 [PH] (ref 5–8)
PLATELET # BLD AUTO: 269 10*3/MM3 (ref 140–450)
PMV BLD AUTO: 8.4 FL (ref 6–12)
POTASSIUM SERPL-SCNC: 4.1 MMOL/L (ref 3.5–5.2)
PROT SERPL-MCNC: 8 G/DL (ref 6–8.5)
PROT UR QL STRIP: ABNORMAL
RBC # BLD AUTO: 3.61 10*6/MM3 (ref 3.77–5.28)
RBC # UR STRIP: ABNORMAL /HPF
REF LAB TEST METHOD: ABNORMAL
SARS-COV-2 RNA RESP QL NAA+PROBE: NOT DETECTED
SODIUM SERPL-SCNC: 131 MMOL/L (ref 136–145)
SP GR UR STRIP: 1.02 (ref 1–1.03)
SQUAMOUS #/AREA URNS HPF: ABNORMAL /HPF
UROBILINOGEN UR QL STRIP: ABNORMAL
WBC # UR STRIP: ABNORMAL /HPF
WBC NRBC COR # BLD: 5.74 10*3/MM3 (ref 3.4–10.8)
WHOLE BLOOD HOLD SPECIMEN: NORMAL
WHOLE BLOOD HOLD SPECIMEN: NORMAL

## 2022-03-28 PROCEDURE — 96376 TX/PRO/DX INJ SAME DRUG ADON: CPT

## 2022-03-28 PROCEDURE — 99284 EMERGENCY DEPT VISIT MOD MDM: CPT

## 2022-03-28 PROCEDURE — 74176 CT ABD & PELVIS W/O CONTRAST: CPT

## 2022-03-28 PROCEDURE — 87636 SARSCOV2 & INF A&B AMP PRB: CPT | Performed by: PHYSICIAN ASSISTANT

## 2022-03-28 PROCEDURE — 25010000002 HYDROMORPHONE 1 MG/ML SOLUTION: Performed by: EMERGENCY MEDICINE

## 2022-03-28 PROCEDURE — 74176 CT ABD & PELVIS W/O CONTRAST: CPT | Performed by: RADIOLOGY

## 2022-03-28 PROCEDURE — 83605 ASSAY OF LACTIC ACID: CPT | Performed by: PHYSICIAN ASSISTANT

## 2022-03-28 PROCEDURE — 87040 BLOOD CULTURE FOR BACTERIA: CPT | Performed by: PHYSICIAN ASSISTANT

## 2022-03-28 PROCEDURE — 85025 COMPLETE CBC W/AUTO DIFF WBC: CPT | Performed by: PHYSICIAN ASSISTANT

## 2022-03-28 PROCEDURE — 36415 COLL VENOUS BLD VENIPUNCTURE: CPT

## 2022-03-28 PROCEDURE — 86140 C-REACTIVE PROTEIN: CPT | Performed by: PHYSICIAN ASSISTANT

## 2022-03-28 PROCEDURE — 87086 URINE CULTURE/COLONY COUNT: CPT | Performed by: PHYSICIAN ASSISTANT

## 2022-03-28 PROCEDURE — 93971 EXTREMITY STUDY: CPT

## 2022-03-28 PROCEDURE — 81001 URINALYSIS AUTO W/SCOPE: CPT | Performed by: PHYSICIAN ASSISTANT

## 2022-03-28 PROCEDURE — 80053 COMPREHEN METABOLIC PANEL: CPT | Performed by: PHYSICIAN ASSISTANT

## 2022-03-28 PROCEDURE — 96374 THER/PROPH/DIAG INJ IV PUSH: CPT

## 2022-03-28 PROCEDURE — 93971 EXTREMITY STUDY: CPT | Performed by: RADIOLOGY

## 2022-03-28 RX ORDER — SODIUM CHLORIDE 0.9 % (FLUSH) 0.9 %
10 SYRINGE (ML) INJECTION AS NEEDED
Status: DISCONTINUED | OUTPATIENT
Start: 2022-03-28 | End: 2022-03-28 | Stop reason: HOSPADM

## 2022-03-28 RX ADMIN — HYDROMORPHONE HYDROCHLORIDE 1 MG: 1 INJECTION, SOLUTION INTRAMUSCULAR; INTRAVENOUS; SUBCUTANEOUS at 15:39

## 2022-03-28 RX ADMIN — HYDROMORPHONE HYDROCHLORIDE 1 MG: 1 INJECTION, SOLUTION INTRAMUSCULAR; INTRAVENOUS; SUBCUTANEOUS at 18:26

## 2022-03-28 RX ADMIN — SODIUM CHLORIDE 1500 ML: 9 INJECTION, SOLUTION INTRAVENOUS at 15:40

## 2022-03-28 NOTE — ED PROVIDER NOTES
Subjective   Pt was called back due to pos blood cultures. Pt has low abd pain. Pt has had perinephric abscess and has drainage tube in. Was seen here yesterday. Was at [facility] on March 15, 2022 through March 18, 2022 during which time she had left lower quadrant pain felt to be secondary to perinephric and abdominal abscesses with drain dislodgment.         History provided by:  Patient   used: No    Illness  Severity:  Moderate  Onset quality:  Sudden  Duration:  1 day  Timing:  Constant  Progression:  Worsening  Chronicity:  New  Relieved by:  Nothing   Worsened by:  Nothing   Ineffective treatments:  None   Associated symptoms: abdominal pain    Associated symptoms: no chest pain, no congestion, no cough, no diarrhea, no ear pain, no fever, no headaches, no nausea, no rash, no shortness of breath, no sore throat, no vomiting and no wheezing        Review of Systems   Constitutional: Negative for chills and fever.   HENT: Negative for congestion, ear pain and sore throat.    Respiratory: Negative for cough, shortness of breath and wheezing.    Cardiovascular: Negative for chest pain.   Gastrointestinal: Positive for abdominal pain. Negative for diarrhea, nausea and vomiting.   Genitourinary: Negative for dysuria and flank pain.   Skin: Negative for rash.   Neurological: Negative for headaches.   Psychiatric/Behavioral: The patient is not nervous/anxious.    All other systems reviewed and are negative.      Past Medical History:   Diagnosis Date   • A-fib (HCC)    • Abnormal ECG    • Anemia    • Anxiety    • Asthma    • Cancer (HCC)     Ovarian   • Depression    • Diabetes mellitus (HCC)    • DVT (deep venous thrombosis) (HCC)    • Factor 5 Leiden mutation, heterozygous (HCC)    • Fibroid    • GERD (gastroesophageal reflux disease)    • Gout    • H/O abdominal abscess    • Hyperlipidemia    • Hypothyroid    • Kidney stone    • Migraines    • Neuropathy    • Ovarian cancer (HCC)  2021   • Ovarian cyst    • PE (pulmonary embolism)    • Polycystic ovary syndrome    • Preeclampsia    • Rh incompatibility    • Stroke (HCC)    • TIA (transient ischemic attack)    • Urinary tract infection    • Varicella        Allergies   Allergen Reactions   • Toradol [Ketorolac Tromethamine] Anaphylaxis and Hives   • Haldol [Haloperidol] Hives and Mental Status Change   • Tramadol Hives and Swelling   • Amoxicillin Hives and Rash   • Penicillins Hives and Rash       Past Surgical History:   Procedure Laterality Date   • ABDOMINAL SURGERY     • CARDIAC CATHETERIZATION     •  SECTION     • CHOLECYSTECTOMY     • COLONOSCOPY     • ENDOSCOPY     • EXTRACORPOREAL SHOCK WAVE LITHOTRIPSY (ESWL) Left 10/22/2021    Procedure: EXTRACORPOREAL SHOCKWAVE LITHOTRIPSY;  Surgeon: Milan Motley MD;  Location: [facility];  Service: Urology;  Laterality: Left;   • LITHOTRIPSY     • RIGHT OOPHORECTOMY     • URETEROSCOPY LASER LITHOTRIPSY WITH STENT INSERTION Left 10/01/2021    Procedure: URETEROSCOPY WITH STENT PLACEMENT;  Surgeon: Milan Motley MD;  Location: [facility];  Service: Urology;  Laterality: Left;       Family History   Problem Relation Age of Onset   • Hypertension Mother    • Diabetes Father    • Hypertension Father    • Heart attack Father    • No Known Problems Sister    • No Known Problems Brother    • No Known Problems Son    • No Known Problems Daughter    • No Known Problems Maternal Grandmother    • No Known Problems Maternal Grandfather    • No Known Problems Paternal Grandmother    • No Known Problems Paternal Grandfather    • No Known Problems Cousin    • Rheum arthritis Neg Hx    • Osteoarthritis Neg Hx    • Asthma Neg Hx    • Heart failure Neg Hx    • Hyperlipidemia Neg Hx    • Migraines Neg Hx    • Rashes / Skin problems Neg Hx    • Seizures Neg Hx    • Stroke Neg Hx    • Thyroid disease Neg Hx        Social History     Socioeconomic History   • Marital status:   "  Tobacco Use   • Smoking status: Never Smoker   • Smokeless tobacco: Never Used   Vaping Use   • Vaping Use: Never used   Substance and Sexual Activity   • Alcohol use: No   • Drug use: Never     Comment: Pt has track marks on right arm. Pt states \"It's from my INR being drawn.\"    • Sexual activity: Not Currently     Partners: Male     Birth control/protection: None           Objective   Physical Exam  Vitals and nursing note reviewed.   Constitutional:       Appearance: She is well-developed.   HENT:      Head: Normocephalic.   Cardiovascular:      Rate and Rhythm: Normal rate and regular rhythm.   Pulmonary:      Effort: Pulmonary effort is normal.      Breath sounds: Normal breath sounds.   Abdominal:      General: Bowel sounds are normal.      Palpations: Abdomen is soft.      Tenderness: There is no abdominal tenderness.   Musculoskeletal:         General: Normal range of motion.      Cervical back: Neck supple.   Skin:     General: Skin is warm and dry.   Neurological:      Mental Status: She is alert and oriented to person, place, and time.   Psychiatric:         Behavior: Behavior normal.         Thought Content: Thought content normal.         Judgment: Judgment normal.         Procedures           ED Course  ED Course as of 03/28/22 1309   Mon Mar 21, 2022   1350 Paged hospitalist  [LC]      ED Course User Index  [LC] Hedy Kate PA                                                 [facility]    Final diagnoses:   Bacteremia       ED Disposition  ED Disposition     ED Disposition   Decision to Admit    Condition   --    Comment   Level of Care: Telemetry [5]   Diagnosis: Bacteremia [790.7.ICD-9-CM]   Certification: I Certify That Inpatient Hospital Services Are Medically Necessary For Greater Than 2 Midnights               Eddie Latif, APRN  [address] [facility] [address]  [phone]               Medication List      No changes were made to your prescriptions during this visit.        "   Hedy Kate PA  03/28/22 1251       Hedy Kate PA  03/28/22 1302

## 2022-03-29 LAB — BACTERIA SPEC AEROBE CULT: NORMAL

## 2022-03-29 NOTE — ED PROVIDER NOTES
Subjective   Patient states that she was seen at  recently and had a drain tube placed to her left flank.  Patient states she is not having a lot of drainage from that area at this time.  Patient is also having increased pain to her left lower abdominal area.      History provided by:  Patient   used: No    Abdominal Pain  Pain location:  L flank  Pain quality: aching    Pain radiates to:  Suprapubic region  Pain severity:  Mild  Onset quality:  Sudden  Duration:  1 week  Timing:  Constant  Progression:  Worsening  Chronicity:  New  Relieved by:  Nothing  Worsened by:  Nothing  Ineffective treatments:  None tried (has drain tube to perinephric area )  Associated symptoms: no chest pain, no chills, no cough, no diarrhea, no dysuria, no fever, no nausea, no shortness of breath, no sore throat and no vomiting    Risk factors: obesity and recent hospitalization        Review of Systems   Constitutional: Negative for chills and fever.   HENT: Negative for congestion, ear pain and sore throat.    Respiratory: Negative for cough, shortness of breath and wheezing.    Cardiovascular: Negative for chest pain.   Gastrointestinal: Positive for abdominal pain. Negative for diarrhea, nausea and vomiting.   Genitourinary: Negative for dysuria and flank pain.   Skin: Negative for rash.   Neurological: Negative for headaches.   Psychiatric/Behavioral: The patient is not nervous/anxious.    All other systems reviewed and are negative.      Past Medical History:   Diagnosis Date   • A-fib (HCC)    • Abnormal ECG    • Anemia    • Anxiety    • Asthma    • Cancer (HCC)     Ovarian   • Depression    • Diabetes mellitus (HCC)    • DVT (deep venous thrombosis) (HCC)    • Factor 5 Leiden mutation, heterozygous (HCC)    • Fibroid    • GERD (gastroesophageal reflux disease)    • Gout    • H/O abdominal abscess    • Hyperlipidemia    • Hypothyroid    • Kidney stone    • Migraines    • Neuropathy    • Ovarian cancer (HCC)  2021   • Ovarian cyst    • PE (pulmonary embolism)    • Polycystic ovary syndrome    • Preeclampsia    • Rh incompatibility    • Stroke (HCC)    • TIA (transient ischemic attack)    • Urinary tract infection    • Varicella        Allergies   Allergen Reactions   • Toradol [Ketorolac Tromethamine] Anaphylaxis and Hives   • Haldol [Haloperidol] Hives and Mental Status Change   • Tramadol Hives and Swelling   • Amoxicillin Hives and Rash   • Penicillins Hives and Rash       Past Surgical History:   Procedure Laterality Date   • ABDOMINAL SURGERY     • CARDIAC CATHETERIZATION     •  SECTION     • CHOLECYSTECTOMY     • COLONOSCOPY     • ENDOSCOPY     • EXTRACORPOREAL SHOCK WAVE LITHOTRIPSY (ESWL) Left 10/22/2021    Procedure: EXTRACORPOREAL SHOCKWAVE LITHOTRIPSY;  Surgeon: Milan Motley MD;  Location: [facility];  Service: Urology;  Laterality: Left;   • LITHOTRIPSY     • RIGHT OOPHORECTOMY     • URETEROSCOPY LASER LITHOTRIPSY WITH STENT INSERTION Left 10/01/2021    Procedure: URETEROSCOPY WITH STENT PLACEMENT;  Surgeon: Milan Motley MD;  Location: [facility];  Service: Urology;  Laterality: Left;       Family History   Problem Relation Age of Onset   • Hypertension Mother    • Diabetes Father    • Hypertension Father    • Heart attack Father    • No Known Problems Sister    • No Known Problems Brother    • No Known Problems Son    • No Known Problems Daughter    • No Known Problems Maternal Grandmother    • No Known Problems Maternal Grandfather    • No Known Problems Paternal Grandmother    • No Known Problems Paternal Grandfather    • No Known Problems Cousin    • Rheum arthritis Neg Hx    • Osteoarthritis Neg Hx    • Asthma Neg Hx    • Heart failure Neg Hx    • Hyperlipidemia Neg Hx    • Migraines Neg Hx    • Rashes / Skin problems Neg Hx    • Seizures Neg Hx    • Stroke Neg Hx    • Thyroid disease Neg Hx        Social History     Socioeconomic History   • Marital status:   "  Tobacco Use   • Smoking status: Never Smoker   • Smokeless tobacco: Never Used   Vaping Use   • Vaping Use: Never used   Substance and Sexual Activity   • Alcohol use: No   • Drug use: Never     Comment: Pt has track marks on right arm. Pt states \"It's from my INR being drawn.\"    • Sexual activity: Not Currently     Partners: Male     Birth control/protection: None           Objective   Physical Exam  Vitals and nursing note reviewed.   Constitutional:       Appearance: She is well-developed.   HENT:      Head: Normocephalic.   Cardiovascular:      Rate and Rhythm: Normal rate and regular rhythm.   Pulmonary:      Effort: Pulmonary effort is normal.      Breath sounds: Normal breath sounds.   Abdominal:      General: Bowel sounds are normal.      Palpations: Abdomen is soft.      Tenderness: There is no abdominal tenderness.      Comments: Drainage tube present.   Musculoskeletal:         General: Normal range of motion.      Cervical back: Neck supple.   Skin:     General: Skin is warm and dry.   Neurological:      Mental Status: She is alert and oriented to person, place, and time.   Psychiatric:         Behavior: Behavior normal.         Thought Content: Thought content normal.         Judgment: Judgment normal.         Procedures           ED Course                                                 MDM    Final diagnoses:   Acute cystitis with hematuria       ED Disposition  ED Disposition     ED Disposition   Discharge    Condition   Stable    Comment   --             Milan Motley MD  [address] [facility] [address]  [facility] [id number] [phone]    In 2 days           Medication List      No changes were made to your prescriptions during this visit.          Hedy Kate PA  03/29/22 4835    "

## 2022-04-02 ENCOUNTER — HOSPITAL ENCOUNTER (EMERGENCY)
Facility: HOSPITAL | Age: 37
Discharge: HOME | End: 2022-04-02
Attending: EMERGENCY MEDICINE | Admitting: EMERGENCY MEDICINE

## 2022-04-02 ENCOUNTER — APPOINTMENT (OUTPATIENT)
Dept: CT IMAGING | Facility: HOSPITAL | Age: 37
End: 2022-04-02

## 2022-04-02 VITALS
HEART RATE: 100 BPM | BODY MASS INDEX: 40.97 KG/M2 | DIASTOLIC BLOOD PRESSURE: 96 MMHG | HEIGHT: 64 IN | WEIGHT: 240 LBS | RESPIRATION RATE: 18 BRPM | OXYGEN SATURATION: 97 % | TEMPERATURE: 98.5 F | SYSTOLIC BLOOD PRESSURE: 135 MMHG

## 2022-04-02 DIAGNOSIS — N39.0 ACUTE UTI: Primary | ICD-10-CM

## 2022-04-02 LAB
ALBUMIN SERPL-MCNC: 3.94 G/DL (ref 3.5–5.2)
ALBUMIN/GLOB SERPL: 1 G/DL
ALP SERPL-CCNC: 126 U/L (ref 39–117)
ALT SERPL W P-5'-P-CCNC: 22 U/L (ref 1–33)
AMPHET+METHAMPHET UR QL: NEGATIVE
AMPHETAMINES UR QL: NEGATIVE
ANION GAP SERPL CALCULATED.3IONS-SCNC: 13.1 MMOL/L (ref 5–15)
AST SERPL-CCNC: 18 U/L (ref 1–32)
B-HCG UR QL: NEGATIVE
BACTERIA SPEC AEROBE CULT: NORMAL
BACTERIA SPEC AEROBE CULT: NORMAL
BACTERIA UR QL AUTO: ABNORMAL /HPF
BARBITURATES UR QL SCN: NEGATIVE
BASOPHILS # BLD AUTO: 0.04 10*3/MM3 (ref 0–0.2)
BASOPHILS NFR BLD AUTO: 0.6 % (ref 0–1.5)
BENZODIAZ UR QL SCN: NEGATIVE
BILIRUB SERPL-MCNC: 0.3 MG/DL (ref 0–1.2)
BILIRUB UR QL STRIP: NEGATIVE
BUN SERPL-MCNC: 13 MG/DL (ref 6–20)
BUN/CREAT SERPL: 17.3 (ref 7–25)
BUPRENORPHINE SERPL-MCNC: NEGATIVE NG/ML
CALCIUM SPEC-SCNC: 9.6 MG/DL (ref 8.6–10.5)
CANNABINOIDS SERPL QL: NEGATIVE
CHLORIDE SERPL-SCNC: 98 MMOL/L (ref 98–107)
CLARITY UR: ABNORMAL
CO2 SERPL-SCNC: 20.9 MMOL/L (ref 22–29)
COCAINE UR QL: NEGATIVE
COLOR UR: YELLOW
CREAT SERPL-MCNC: 0.75 MG/DL (ref 0.57–1)
DEPRECATED RDW RBC AUTO: 46.2 FL (ref 37–54)
EGFRCR SERPLBLD CKD-EPI 2021: 106 ML/MIN/1.73
EOSINOPHIL # BLD AUTO: 0.21 10*3/MM3 (ref 0–0.4)
EOSINOPHIL NFR BLD AUTO: 3.4 % (ref 0.3–6.2)
ERYTHROCYTE [DISTWIDTH] IN BLOOD BY AUTOMATED COUNT: 13.7 % (ref 12.3–15.4)
FLUAV RNA RESP QL NAA+PROBE: NOT DETECTED
FLUBV RNA RESP QL NAA+PROBE: NOT DETECTED
GLOBULIN UR ELPH-MCNC: 3.8 GM/DL
GLUCOSE SERPL-MCNC: 204 MG/DL (ref 65–99)
GLUCOSE UR STRIP-MCNC: ABNORMAL MG/DL
HCT VFR BLD AUTO: 32.6 % (ref 34–46.6)
HGB BLD-MCNC: 11.1 G/DL (ref 12–15.9)
HGB UR QL STRIP.AUTO: ABNORMAL
HYALINE CASTS UR QL AUTO: ABNORMAL /LPF
IMM GRANULOCYTES # BLD AUTO: 0.02 10*3/MM3 (ref 0–0.05)
IMM GRANULOCYTES NFR BLD AUTO: 0.3 % (ref 0–0.5)
KETONES UR QL STRIP: NEGATIVE
LEUKOCYTE ESTERASE UR QL STRIP.AUTO: ABNORMAL
LIPASE SERPL-CCNC: 41 U/L (ref 13–60)
LYMPHOCYTES # BLD AUTO: 1.75 10*3/MM3 (ref 0.7–3.1)
LYMPHOCYTES NFR BLD AUTO: 28.3 % (ref 19.6–45.3)
MAGNESIUM SERPL-MCNC: 1.6 MG/DL (ref 1.6–2.6)
MCH RBC QN AUTO: 31.6 PG (ref 26.6–33)
MCHC RBC AUTO-ENTMCNC: 34 G/DL (ref 31.5–35.7)
MCV RBC AUTO: 92.9 FL (ref 79–97)
METHADONE UR QL SCN: NEGATIVE
MONOCYTES # BLD AUTO: 0.56 10*3/MM3 (ref 0.1–0.9)
MONOCYTES NFR BLD AUTO: 9 % (ref 5–12)
NEUTROPHILS NFR BLD AUTO: 3.61 10*3/MM3 (ref 1.7–7)
NEUTROPHILS NFR BLD AUTO: 58.4 % (ref 42.7–76)
NITRITE UR QL STRIP: NEGATIVE
NRBC BLD AUTO-RTO: 0 /100 WBC (ref 0–0.2)
OPIATES UR QL: NEGATIVE
OXYCODONE UR QL SCN: POSITIVE
PCP UR QL SCN: NEGATIVE
PH UR STRIP.AUTO: 5.5 [PH] (ref 5–8)
PLATELET # BLD AUTO: 209 10*3/MM3 (ref 140–450)
PMV BLD AUTO: 7.9 FL (ref 6–12)
POTASSIUM SERPL-SCNC: 4.1 MMOL/L (ref 3.5–5.2)
PROPOXYPH UR QL: NEGATIVE
PROT SERPL-MCNC: 7.7 G/DL (ref 6–8.5)
PROT UR QL STRIP: ABNORMAL
RBC # BLD AUTO: 3.51 10*6/MM3 (ref 3.77–5.28)
RBC # UR STRIP: ABNORMAL /HPF
REF LAB TEST METHOD: ABNORMAL
SARS-COV-2 RNA RESP QL NAA+PROBE: NOT DETECTED
SODIUM SERPL-SCNC: 132 MMOL/L (ref 136–145)
SP GR UR STRIP: 1.02 (ref 1–1.03)
SQUAMOUS #/AREA URNS HPF: ABNORMAL /HPF
TRICYCLICS UR QL SCN: NEGATIVE
UROBILINOGEN UR QL STRIP: ABNORMAL
WBC # UR STRIP: ABNORMAL /HPF
WBC NRBC COR # BLD: 6.19 10*3/MM3 (ref 3.4–10.8)
YEAST URNS QL MICRO: ABNORMAL /HPF

## 2022-04-02 PROCEDURE — 80053 COMPREHEN METABOLIC PANEL: CPT | Performed by: EMERGENCY MEDICINE

## 2022-04-02 PROCEDURE — 99284 EMERGENCY DEPT VISIT MOD MDM: CPT

## 2022-04-02 PROCEDURE — 25010000002 ONDANSETRON PER 1 MG: Performed by: EMERGENCY MEDICINE

## 2022-04-02 PROCEDURE — 25010000002 HYDROMORPHONE 1 MG/ML SOLUTION: Performed by: EMERGENCY MEDICINE

## 2022-04-02 PROCEDURE — 36415 COLL VENOUS BLD VENIPUNCTURE: CPT

## 2022-04-02 PROCEDURE — 87086 URINE CULTURE/COLONY COUNT: CPT | Performed by: EMERGENCY MEDICINE

## 2022-04-02 PROCEDURE — 81001 URINALYSIS AUTO W/SCOPE: CPT | Performed by: EMERGENCY MEDICINE

## 2022-04-02 PROCEDURE — 80306 DRUG TEST PRSMV INSTRMNT: CPT | Performed by: EMERGENCY MEDICINE

## 2022-04-02 PROCEDURE — 96374 THER/PROPH/DIAG INJ IV PUSH: CPT

## 2022-04-02 PROCEDURE — 96375 TX/PRO/DX INJ NEW DRUG ADDON: CPT

## 2022-04-02 PROCEDURE — 83735 ASSAY OF MAGNESIUM: CPT | Performed by: EMERGENCY MEDICINE

## 2022-04-02 PROCEDURE — 83690 ASSAY OF LIPASE: CPT | Performed by: EMERGENCY MEDICINE

## 2022-04-02 PROCEDURE — 81025 URINE PREGNANCY TEST: CPT | Performed by: EMERGENCY MEDICINE

## 2022-04-02 PROCEDURE — 85025 COMPLETE CBC W/AUTO DIFF WBC: CPT | Performed by: EMERGENCY MEDICINE

## 2022-04-02 PROCEDURE — 87636 SARSCOV2 & INF A&B AMP PRB: CPT | Performed by: EMERGENCY MEDICINE

## 2022-04-02 PROCEDURE — 74176 CT ABD & PELVIS W/O CONTRAST: CPT

## 2022-04-02 PROCEDURE — 87077 CULTURE AEROBIC IDENTIFY: CPT | Performed by: EMERGENCY MEDICINE

## 2022-04-02 RX ORDER — ONDANSETRON 2 MG/ML
4 INJECTION INTRAMUSCULAR; INTRAVENOUS ONCE
Status: COMPLETED | OUTPATIENT
Start: 2022-04-02 | End: 2022-04-02

## 2022-04-02 RX ORDER — DOXYCYCLINE HYCLATE 100 MG/1
100 TABLET, DELAYED RELEASE ORAL 2 TIMES DAILY
Qty: 20 TABLET | Refills: 0 | OUTPATIENT
Start: 2022-04-02 | End: 2022-04-10

## 2022-04-02 RX ORDER — DOXYCYCLINE 100 MG/1
100 CAPSULE ORAL ONCE
Status: COMPLETED | OUTPATIENT
Start: 2022-04-02 | End: 2022-04-02

## 2022-04-02 RX ADMIN — HYDROMORPHONE HYDROCHLORIDE 1 MG: 1 INJECTION, SOLUTION INTRAMUSCULAR; INTRAVENOUS; SUBCUTANEOUS at 19:35

## 2022-04-02 RX ADMIN — DOXYCYCLINE 100 MG: 100 CAPSULE ORAL at 20:09

## 2022-04-02 RX ADMIN — ONDANSETRON 4 MG: 2 INJECTION INTRAMUSCULAR; INTRAVENOUS at 19:40

## 2022-04-03 NOTE — ED PROVIDER NOTES
Subjective   Patient presents to [facility] with abdominal pain        Abdominal Pain  Pain location:  Suprapubic  Pain quality: aching    Pain radiates to:  Does not radiate  Pain severity:  Mild  Onset quality:  Gradual  Timing:  Constant  Chronicity:  New  Relieved by:  Nothing  Worsened by:  Nothing  Ineffective treatments:  None tried  Associated symptoms: chills and dysuria        Review of Systems   Constitutional: Positive for activity change and chills.   HENT: Negative.    Eyes: Negative.    Respiratory: Negative.    Cardiovascular: Negative.    Gastrointestinal: Positive for abdominal pain.   Genitourinary: Positive for dysuria and frequency.   Allergic/Immunologic: Negative.    Neurological: Negative.    Hematological: Negative.    Psychiatric/Behavioral: Negative.        Past Medical History:   Diagnosis Date   • A-fib (HCC)    • Abnormal ECG    • Anemia    • Anxiety    • Asthma    • Cancer (HCC)     Ovarian   • Depression    • Diabetes mellitus (HCC)    • DVT (deep venous thrombosis) (HCC)    • Factor 5 Leiden mutation, heterozygous (HCC)    • Fibroid    • GERD (gastroesophageal reflux disease)    • Gout    • H/O abdominal abscess    • Hyperlipidemia    • Hypothyroid    • Kidney stone    • Migraines    • Neuropathy    • Ovarian cancer (HCC) 2021   • Ovarian cyst    • PE (pulmonary embolism)    • Polycystic ovary syndrome    • Preeclampsia    • Rh incompatibility    • Stroke (HCC)    • TIA (transient ischemic attack)    • Urinary tract infection    • Varicella        Allergies   Allergen Reactions   • Toradol [Ketorolac Tromethamine] Anaphylaxis and Hives   • Haldol [Haloperidol] Hives and Mental Status Change   • Tramadol Hives and Swelling   • Amoxicillin Hives and Rash   • Penicillins Hives and Rash       Past Surgical History:   Procedure Laterality Date   • ABDOMINAL SURGERY     • CARDIAC CATHETERIZATION     •  SECTION     • CHOLECYSTECTOMY     • COLONOSCOPY     • ENDOSCOPY     • EXTRACORPOREAL  "SHOCK WAVE LITHOTRIPSY (ESWL) Left 10/22/2021    Procedure: EXTRACORPOREAL SHOCKWAVE LITHOTRIPSY;  Surgeon: Milan Motley MD;  Location: [facility];  Service: Urology;  Laterality: Left;   • LITHOTRIPSY     • RIGHT OOPHORECTOMY     • URETEROSCOPY LASER LITHOTRIPSY WITH STENT INSERTION Left 10/01/2021    Procedure: URETEROSCOPY WITH STENT PLACEMENT;  Surgeon: Milan Motley MD;  Location: [facility];  Service: Urology;  Laterality: Left;       Family History   Problem Relation Age of Onset   • Hypertension Mother    • Diabetes Father    • Hypertension Father    • Heart attack Father    • No Known Problems Sister    • No Known Problems Brother    • No Known Problems Son    • No Known Problems Daughter    • No Known Problems Maternal Grandmother    • No Known Problems Maternal Grandfather    • No Known Problems Paternal Grandmother    • No Known Problems Paternal Grandfather    • No Known Problems Cousin    • Rheum arthritis Neg Hx    • Osteoarthritis Neg Hx    • Asthma Neg Hx    • Heart failure Neg Hx    • Hyperlipidemia Neg Hx    • Migraines Neg Hx    • Rashes / Skin problems Neg Hx    • Seizures Neg Hx    • Stroke Neg Hx    • Thyroid disease Neg Hx        Social History     Socioeconomic History   • Marital status:    Tobacco Use   • Smoking status: Never Smoker   • Smokeless tobacco: Never Used   Vaping Use   • Vaping Use: Never used   Substance and Sexual Activity   • Alcohol use: No   • Drug use: Never     Comment: Pt has track marks on right arm. Pt states \"It's from my INR being drawn.\"    • Sexual activity: Not Currently     Partners: Male     Birth control/protection: None           Objective   Physical Exam  Vitals and nursing note reviewed.   Constitutional:       Appearance: She is well-developed.   HENT:      Head: Normocephalic.      Mouth/Throat:      Mouth: Mucous membranes are moist.   Eyes:      Extraocular Movements: Extraocular movements intact.   Cardiovascular:      Rate " and Rhythm: Normal rate.      Heart sounds: Normal heart sounds.   Abdominal:      General: Abdomen is flat.      Palpations: Abdomen is soft.   Skin:     General: Skin is warm.      Capillary Refill: Capillary refill takes less than 2 seconds.   Neurological:      General: No focal deficit present.      Mental Status: She is alert.         Procedures           ED Course                                                 MDM    Final diagnoses:   Acute UTI       ED Disposition  ED Disposition     ED Disposition   Discharge    Condition   Stable    Comment   --             Eddie Latif, APRN  [address] [facility] [address]  [phone]    Schedule an appointment as soon as possible for a visit   If symptoms worsen         Medication List      ASK your doctor about these medications    fluconazole 150 MG tablet  Commonly known as: DIFLUCAN  Take 1 tablet by mouth 1 (One) Time for 1 dose.  Ask about: Should I take this medication?           Where to Get Your Medications      These medications were sent to [facility] - [place], [place] - [address] [place] [address] - [phone]  - [phone] [address] [address]    Phone: [phone]   · fluconazole 150 MG tablet          Francisco J Kate MD  04/02/22 2005       Francisco J Kate MD  04/11/22 0711

## 2022-04-04 ENCOUNTER — APPOINTMENT (OUTPATIENT)
Dept: GENERAL RADIOLOGY | Facility: HOSPITAL | Age: 37
End: 2022-04-04

## 2022-04-04 ENCOUNTER — APPOINTMENT (OUTPATIENT)
Dept: CT IMAGING | Facility: HOSPITAL | Age: 37
End: 2022-04-04

## 2022-04-04 ENCOUNTER — HOSPITAL ENCOUNTER (EMERGENCY)
Facility: HOSPITAL | Age: 37
Discharge: HOME | End: 2022-04-05
Attending: EMERGENCY MEDICINE | Admitting: EMERGENCY MEDICINE

## 2022-04-04 DIAGNOSIS — M79.605 LEFT LEG PAIN: ICD-10-CM
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
## 2022-04-04 LAB
A-A DO2: ABNORMAL
ALBUMIN SERPL-MCNC: 4.07 G/DL (ref 3.5–5.2)
ALBUMIN/GLOB SERPL: 1 G/DL
ALP SERPL-CCNC: 122 U/L (ref 39–117)
ALT SERPL W P-5'-P-CCNC: 18 U/L (ref 1–33)
ANION GAP SERPL CALCULATED.3IONS-SCNC: 13.1 MMOL/L (ref 5–15)
APTT PPP: 28.9 SECONDS (ref 26.5–34.5)
ARTERIAL PATENCY WRIST A: POSITIVE
AST SERPL-CCNC: 15 U/L (ref 1–32)
ATMOSPHERIC PRESS: 725 MMHG
BASE EXCESS BLDA CALC-SCNC: -1.5 MMOL/L (ref 0–2)
BASOPHILS # BLD AUTO: 0.03 10*3/MM3 (ref 0–0.2)
BASOPHILS NFR BLD AUTO: 0.6 % (ref 0–1.5)
BDY SITE: ABNORMAL
BILIRUB SERPL-MCNC: 0.3 MG/DL (ref 0–1.2)
BODY TEMPERATURE: 0 C
BUN SERPL-MCNC: 21 MG/DL (ref 6–20)
BUN/CREAT SERPL: 25.6 (ref 7–25)
CALCIUM SPEC-SCNC: 9.9 MG/DL (ref 8.6–10.5)
CHLORIDE SERPL-SCNC: 95 MMOL/L (ref 98–107)
CK SERPL-CCNC: 24 U/L (ref 20–180)
CO2 BLDA-SCNC: 24.9 MMOL/L (ref 22–33)
CO2 SERPL-SCNC: 20.9 MMOL/L (ref 22–29)
COHGB MFR BLD: 1.3 % (ref 0–5)
CREAT SERPL-MCNC: 0.82 MG/DL (ref 0.57–1)
CRP SERPL-MCNC: 1 MG/DL (ref 0–0.5)
DEPRECATED RDW RBC AUTO: 46.1 FL (ref 37–54)
EGFRCR SERPLBLD CKD-EPI 2021: 95.2 ML/MIN/1.73
EOSINOPHIL # BLD AUTO: 0.15 10*3/MM3 (ref 0–0.4)
EOSINOPHIL NFR BLD AUTO: 2.9 % (ref 0.3–6.2)
ERYTHROCYTE [DISTWIDTH] IN BLOOD BY AUTOMATED COUNT: 13.7 % (ref 12.3–15.4)
ERYTHROCYTE [SEDIMENTATION RATE] IN BLOOD: 39 MM/HR (ref 0–20)
ETHANOL BLD-MCNC: <10 MG/DL (ref 0–10)
ETHANOL UR QL: <0.01 %
FLUAV SUBTYP SPEC NAA+PROBE: NOT DETECTED
FLUBV RNA ISLT QL NAA+PROBE: NOT DETECTED
GAS FLOW AIRWAY: 2 LPM
GLOBULIN UR ELPH-MCNC: 3.9 GM/DL
GLUCOSE SERPL-MCNC: 160 MG/DL (ref 65–99)
HCO3 BLDA-SCNC: 23.6 MMOL/L (ref 20–26)
HCT VFR BLD AUTO: 34.8 % (ref 34–46.6)
HCT VFR BLD CALC: 38.4 % (ref 38–51)
HGB BLD-MCNC: 12 G/DL (ref 12–15.9)
HGB BLDA-MCNC: 12.5 G/DL (ref 13.5–17.5)
IMM GRANULOCYTES # BLD AUTO: 0.01 10*3/MM3 (ref 0–0.05)
IMM GRANULOCYTES NFR BLD AUTO: 0.2 % (ref 0–0.5)
INHALED O2 CONCENTRATION: 28 %
INR PPP: 0.93 (ref 0.9–1.1)
LYMPHOCYTES # BLD AUTO: 1.67 10*3/MM3 (ref 0.7–3.1)
LYMPHOCYTES NFR BLD AUTO: 32.1 % (ref 19.6–45.3)
Lab: ABNORMAL
MAGNESIUM SERPL-MCNC: 1.7 MG/DL (ref 1.6–2.6)
MCH RBC QN AUTO: 32.2 PG (ref 26.6–33)
MCHC RBC AUTO-ENTMCNC: 34.5 G/DL (ref 31.5–35.7)
MCV RBC AUTO: 93.3 FL (ref 79–97)
METHGB BLD QL: 0 % (ref 0–3)
MODALITY: ABNORMAL
MONOCYTES # BLD AUTO: 0.51 10*3/MM3 (ref 0.1–0.9)
MONOCYTES NFR BLD AUTO: 9.8 % (ref 5–12)
NEUTROPHILS NFR BLD AUTO: 2.83 10*3/MM3 (ref 1.7–7)
NEUTROPHILS NFR BLD AUTO: 54.4 % (ref 42.7–76)
NOTE: ABNORMAL
NOTIFIED BY: ABNORMAL
NOTIFIED WHO: ABNORMAL
NRBC BLD AUTO-RTO: 0 /100 WBC (ref 0–0.2)
NT-PROBNP SERPL-MCNC: 53.6 PG/ML (ref 0–450)
OXYHGB MFR BLDV: 98.7 % (ref 94–99)
PCO2 BLDA: 40.5 MM HG (ref 35–45)
PCO2 TEMP ADJ BLD: ABNORMAL MM[HG]
PH BLDA: 7.38 PH UNITS (ref 7.35–7.45)
PH, TEMP CORRECTED: ABNORMAL
PLATELET # BLD AUTO: 289 10*3/MM3 (ref 140–450)
PMV BLD AUTO: 8.6 FL (ref 6–12)
PO2 BLDA: 157 MM HG (ref 83–108)
PO2 TEMP ADJ BLD: ABNORMAL MM[HG]
POTASSIUM SERPL-SCNC: 4.2 MMOL/L (ref 3.5–5.2)
PROT SERPL-MCNC: 8 G/DL (ref 6–8.5)
PROTHROMBIN TIME: 12.7 SECONDS (ref 12.1–14.7)
RBC # BLD AUTO: 3.73 10*6/MM3 (ref 3.77–5.28)
SAO2 % BLDCOA: >99.2 % (ref 94–99)
SARS-COV-2 RNA PNL SPEC NAA+PROBE: NOT DETECTED
SODIUM SERPL-SCNC: 129 MMOL/L (ref 136–145)
T4 FREE SERPL-MCNC: 0.82 NG/DL (ref 0.93–1.7)
TROPONIN T SERPL-MCNC: <0.01 NG/ML (ref 0–0.03)
TSH SERPL DL<=0.05 MIU/L-ACNC: 4.04 UIU/ML (ref 0.27–4.2)
VENTILATOR MODE: ABNORMAL
WBC NRBC COR # BLD: 5.2 10*3/MM3 (ref 3.4–10.8)

## 2022-04-04 PROCEDURE — 99284 EMERGENCY DEPT VISIT MOD MDM: CPT

## 2022-04-04 PROCEDURE — 83880 ASSAY OF NATRIURETIC PEPTIDE: CPT | Performed by: EMERGENCY MEDICINE

## 2022-04-04 PROCEDURE — 83735 ASSAY OF MAGNESIUM: CPT | Performed by: EMERGENCY MEDICINE

## 2022-04-04 PROCEDURE — 36600 WITHDRAWAL OF ARTERIAL BLOOD: CPT

## 2022-04-04 PROCEDURE — 87636 SARSCOV2 & INF A&B AMP PRB: CPT | Performed by: EMERGENCY MEDICINE

## 2022-04-04 PROCEDURE — 86140 C-REACTIVE PROTEIN: CPT | Performed by: EMERGENCY MEDICINE

## 2022-04-04 PROCEDURE — 73552 X-RAY EXAM OF FEMUR 2/>: CPT

## 2022-04-04 PROCEDURE — 85652 RBC SED RATE AUTOMATED: CPT | Performed by: EMERGENCY MEDICINE

## 2022-04-04 PROCEDURE — 82375 ASSAY CARBOXYHB QUANT: CPT

## 2022-04-04 PROCEDURE — 80053 COMPREHEN METABOLIC PANEL: CPT | Performed by: EMERGENCY MEDICINE

## 2022-04-04 PROCEDURE — 85610 PROTHROMBIN TIME: CPT | Performed by: EMERGENCY MEDICINE

## 2022-04-04 PROCEDURE — 84439 ASSAY OF FREE THYROXINE: CPT | Performed by: EMERGENCY MEDICINE

## 2022-04-04 PROCEDURE — 73502 X-RAY EXAM HIP UNI 2-3 VIEWS: CPT

## 2022-04-04 PROCEDURE — 85730 THROMBOPLASTIN TIME PARTIAL: CPT | Performed by: EMERGENCY MEDICINE

## 2022-04-04 PROCEDURE — 85025 COMPLETE CBC W/AUTO DIFF WBC: CPT | Performed by: EMERGENCY MEDICINE

## 2022-04-04 PROCEDURE — 74176 CT ABD & PELVIS W/O CONTRAST: CPT

## 2022-04-04 PROCEDURE — 82805 BLOOD GASES W/O2 SATURATION: CPT

## 2022-04-04 PROCEDURE — 84443 ASSAY THYROID STIM HORMONE: CPT | Performed by: EMERGENCY MEDICINE

## 2022-04-04 PROCEDURE — 82550 ASSAY OF CK (CPK): CPT | Performed by: EMERGENCY MEDICINE

## 2022-04-04 PROCEDURE — 36415 COLL VENOUS BLD VENIPUNCTURE: CPT

## 2022-04-04 PROCEDURE — 71250 CT THORAX DX C-: CPT

## 2022-04-04 PROCEDURE — 82077 ASSAY SPEC XCP UR&BREATH IA: CPT | Performed by: EMERGENCY MEDICINE

## 2022-04-04 PROCEDURE — 83050 HGB METHEMOGLOBIN QUAN: CPT

## 2022-04-04 PROCEDURE — 84484 ASSAY OF TROPONIN QUANT: CPT | Performed by: EMERGENCY MEDICINE

## 2022-04-05 VITALS
HEART RATE: 88 BPM | HEIGHT: 64 IN | BODY MASS INDEX: 40.97 KG/M2 | WEIGHT: 240 LBS | SYSTOLIC BLOOD PRESSURE: 144 MMHG | OXYGEN SATURATION: 97 % | TEMPERATURE: 99.2 F | RESPIRATION RATE: 18 BRPM | DIASTOLIC BLOOD PRESSURE: 93 MMHG

## 2022-04-05 LAB
AMPHET+METHAMPHET UR QL: NEGATIVE
AMPHETAMINES UR QL: NEGATIVE
BACTERIA UR QL AUTO: ABNORMAL /HPF
BARBITURATES UR QL SCN: NEGATIVE
BENZODIAZ UR QL SCN: NEGATIVE
BILIRUB UR QL STRIP: NEGATIVE
BUPRENORPHINE SERPL-MCNC: NEGATIVE NG/ML
CANNABINOIDS SERPL QL: NEGATIVE
CLARITY UR: ABNORMAL
COCAINE UR QL: NEGATIVE
COLOR UR: YELLOW
GLUCOSE UR STRIP-MCNC: NEGATIVE MG/DL
HGB UR QL STRIP.AUTO: ABNORMAL
HYALINE CASTS UR QL AUTO: ABNORMAL /LPF
KETONES UR QL STRIP: NEGATIVE
LEUKOCYTE ESTERASE UR QL STRIP.AUTO: ABNORMAL
METHADONE UR QL SCN: NEGATIVE
NITRITE UR QL STRIP: NEGATIVE
OPIATES UR QL: NEGATIVE
OXYCODONE UR QL SCN: NEGATIVE
PCP UR QL SCN: NEGATIVE
PH UR STRIP.AUTO: <=5 [PH] (ref 5–8)
PROPOXYPH UR QL: NEGATIVE
PROT UR QL STRIP: ABNORMAL
RBC # UR STRIP: ABNORMAL /HPF
REF LAB TEST METHOD: ABNORMAL
SP GR UR STRIP: 1.02 (ref 1–1.03)
SQUAMOUS #/AREA URNS HPF: ABNORMAL /HPF
TRICYCLICS UR QL SCN: NEGATIVE
TROPONIN T SERPL-MCNC: <0.01 NG/ML (ref 0–0.03)
UROBILINOGEN UR QL STRIP: ABNORMAL
WBC # UR STRIP: ABNORMAL /HPF

## 2022-04-05 PROCEDURE — 87086 URINE CULTURE/COLONY COUNT: CPT | Performed by: EMERGENCY MEDICINE

## 2022-04-05 PROCEDURE — 81001 URINALYSIS AUTO W/SCOPE: CPT | Performed by: EMERGENCY MEDICINE

## 2022-04-05 PROCEDURE — 80306 DRUG TEST PRSMV INSTRMNT: CPT | Performed by: EMERGENCY MEDICINE

## 2022-04-05 PROCEDURE — 63710000001 PROMETHAZINE PER 25 MG: Performed by: EMERGENCY MEDICINE

## 2022-04-05 RX ORDER — HYDROCODONE BITARTRATE AND ACETAMINOPHEN 5; 325 MG/1; MG/1
1 TABLET ORAL EVERY 6 HOURS PRN
Qty: 10 TABLET | Refills: 0 | Status: SHIPPED | OUTPATIENT
Start: 2022-04-05 | End: 2022-04-25

## 2022-04-05 RX ORDER — ACETAMINOPHEN 500 MG
1000 TABLET ORAL ONCE
Status: COMPLETED | OUTPATIENT
Start: 2022-04-05 | End: 2022-04-05

## 2022-04-05 RX ORDER — SODIUM CHLORIDE 0.9 % (FLUSH) 0.9 %
10 SYRINGE (ML) INJECTION AS NEEDED
Status: DISCONTINUED | OUTPATIENT
Start: 2022-04-05 | End: 2022-04-05 | Stop reason: HOSPADM

## 2022-04-05 RX ORDER — OXYCODONE HYDROCHLORIDE AND ACETAMINOPHEN 5; 325 MG/1; MG/1
1 TABLET ORAL ONCE
Status: DISCONTINUED | OUTPATIENT
Start: 2022-04-05 | End: 2022-04-05

## 2022-04-05 RX ORDER — OXYCODONE HYDROCHLORIDE 5 MG/1
10 TABLET ORAL ONCE
Status: COMPLETED | OUTPATIENT
Start: 2022-04-05 | End: 2022-04-05

## 2022-04-05 RX ORDER — PROMETHAZINE HYDROCHLORIDE 25 MG/1
25 TABLET ORAL ONCE
Status: COMPLETED | OUTPATIENT
Start: 2022-04-05 | End: 2022-04-05

## 2022-04-05 RX ADMIN — OXYCODONE HYDROCHLORIDE 10 MG: 5 TABLET ORAL at 01:43

## 2022-04-05 RX ADMIN — SODIUM CHLORIDE 1000 ML: 9 INJECTION, SOLUTION INTRAVENOUS at 00:13

## 2022-04-05 RX ADMIN — PROMETHAZINE HYDROCHLORIDE 25 MG: 25 TABLET ORAL at 00:15

## 2022-04-05 RX ADMIN — ACETAMINOPHEN 1000 MG: 500 TABLET ORAL at 00:14

## 2022-04-05 NOTE — ED PROVIDER NOTES
Subjective     History provided by:  Patient   used: No    Fall  Mechanism of injury: fall    Injury location:  Pelvis  Pelvic injury location:  L hip  Incident location:  Home  Arrived directly from scene: yes    Fall:     Fall occurred: Patient reports that she slid out of her bedside chair onto the floor.    Impact surface:  Hard floor    Point of impact: Left Hip Pain.    Entrapped after fall: no    Protective equipment: none    Suspicion of alcohol use: no    Suspicion of drug use: no    Tetanus status:  Up to date  Prior to arrival data:     Bystander interventions:  None    Patient ambulatory at scene: yes      Blood loss:  None    Responsiveness at scene:  Alert    Orientation at scene:  Person, place, situation and time    Loss of consciousness: no      Amnesic to event: no      Airway interventions:  None    Breathing interventions:  None    IV access status:  None    IO access:  None    Fluids administered:  None    Cardiac interventions:  None    Medications administered:  None    Immobilization:  None    Airway condition since incident:  Stable    Breathing condition since incident:  Stable    Circulation condition since incident:  Stable    Mental status condition since incident:  Stable    Disability condition since incident:  Stable  Associated symptoms: no abdominal pain, no back pain, no blindness, no chest pain, no difficulty breathing, no headaches, no hearing loss, no loss of consciousness, no nausea, no neck pain, no seizures and no vomiting    Risk factors: anticoagulation therapy, asthma, diabetes and kidney disease    Risk factors: no AICD, no beta blocker therapy, no CABG, no CAD, no CHF, no COPD, no dialysis, no hemophilia, no pacemaker, no past MI, not pregnant and no steroid use        Review of Systems   Constitutional: Negative for activity change, appetite change, chills, diaphoresis, fatigue and fever.   HENT: Negative for congestion, ear pain, hearing loss and  sore throat.    Eyes: Negative for blindness and redness.   Respiratory: Negative for cough, chest tightness, shortness of breath and wheezing.    Cardiovascular: Negative for chest pain, palpitations and leg swelling.   Gastrointestinal: Negative for abdominal pain, diarrhea, nausea and vomiting.   Genitourinary: Negative for dysuria and urgency.   Musculoskeletal: Positive for myalgias. Negative for arthralgias, back pain and neck pain.   Skin: Negative for pallor, rash and wound.   Neurological: Negative for dizziness, seizures, loss of consciousness, speech difficulty, weakness and headaches.   Psychiatric/Behavioral: Negative for agitation, behavioral problems, confusion and decreased concentration.   All other systems reviewed and are negative.      Past Medical History:   Diagnosis Date   • A-fib (HCC)    • Abnormal ECG    • Anemia    • Anxiety    • Asthma    • Cancer (HCC)     Ovarian   • Depression    • Diabetes mellitus (HCC)    • DVT (deep venous thrombosis) (HCC)    • Factor 5 Leiden mutation, heterozygous (HCC)    • Fibroid    • GERD (gastroesophageal reflux disease)    • Gout    • H/O abdominal abscess    • Hyperlipidemia    • Hypothyroid    • Kidney stone    • Migraines    • Neuropathy    • Ovarian cancer (HCC) 2021   • Ovarian cyst    • PE (pulmonary embolism)    • Polycystic ovary syndrome    • Preeclampsia    • Rh incompatibility    • Stroke (HCC)    • TIA (transient ischemic attack)    • Urinary tract infection    • Varicella        Allergies   Allergen Reactions   • Toradol [Ketorolac Tromethamine] Anaphylaxis and Hives   • Haldol [Haloperidol] Hives and Mental Status Change   • Tramadol Hives and Swelling   • Amoxicillin Hives and Rash   • Penicillins Hives and Rash       Past Surgical History:   Procedure Laterality Date   • ABDOMINAL SURGERY     • CARDIAC CATHETERIZATION     •  SECTION     • CHOLECYSTECTOMY     • COLONOSCOPY     • ENDOSCOPY     • EXTRACORPOREAL SHOCK WAVE  "LITHOTRIPSY (ESWL) Left 10/22/2021    Procedure: EXTRACORPOREAL SHOCKWAVE LITHOTRIPSY;  Surgeon: Milan Motley MD;  Location: [facility];  Service: Urology;  Laterality: Left;   • LITHOTRIPSY     • RIGHT OOPHORECTOMY     • URETEROSCOPY LASER LITHOTRIPSY WITH STENT INSERTION Left 10/01/2021    Procedure: URETEROSCOPY WITH STENT PLACEMENT;  Surgeon: Milan Motley MD;  Location: [facility];  Service: Urology;  Laterality: Left;       Family History   Problem Relation Age of Onset   • Hypertension Mother    • Diabetes Father    • Hypertension Father    • Heart attack Father    • No Known Problems Sister    • No Known Problems Brother    • No Known Problems Son    • No Known Problems Daughter    • No Known Problems Maternal Grandmother    • No Known Problems Maternal Grandfather    • No Known Problems Paternal Grandmother    • No Known Problems Paternal Grandfather    • No Known Problems Cousin    • Rheum arthritis Neg Hx    • Osteoarthritis Neg Hx    • Asthma Neg Hx    • Heart failure Neg Hx    • Hyperlipidemia Neg Hx    • Migraines Neg Hx    • Rashes / Skin problems Neg Hx    • Seizures Neg Hx    • Stroke Neg Hx    • Thyroid disease Neg Hx        Social History     Socioeconomic History   • Marital status:    Tobacco Use   • Smoking status: Never Smoker   • Smokeless tobacco: Never Used   Vaping Use   • Vaping Use: Never used   Substance and Sexual Activity   • Alcohol use: No   • Drug use: Never     Comment: Pt has track marks on right arm. Pt states \"It's from my INR being drawn.\"    • Sexual activity: Not Currently     Partners: Male     Birth control/protection: None           Objective   Physical Exam  Vitals and nursing note reviewed.   Constitutional:       General: She is not in acute distress.     Appearance: Normal appearance. She is well-developed. She is not toxic-appearing or diaphoretic.   HENT:      Head: Normocephalic and atraumatic.      Right Ear: External ear normal.      " Left Ear: External ear normal.      Nose: Nose normal.      Mouth/Throat:      Pharynx: No oropharyngeal exudate.      Tonsils: No tonsillar exudate.   Eyes:      General: Lids are normal.      Conjunctiva/sclera: Conjunctivae normal.      Pupils: Pupils are equal, round, and reactive to light.   Neck:      Thyroid: No thyromegaly.   Cardiovascular:      Rate and Rhythm: Normal rate and regular rhythm.      Pulses: Normal pulses.      Heart sounds: Normal heart sounds, S1 normal and S2 normal.   Pulmonary:      Effort: Pulmonary effort is normal. No tachypnea or respiratory distress.      Breath sounds: Normal breath sounds. No decreased breath sounds, wheezing or rales.   Chest:      Chest wall: No tenderness.   Abdominal:      General: Bowel sounds are normal. There is no distension.      Palpations: Abdomen is soft.      Tenderness: There is no abdominal tenderness. There is no guarding or rebound.   Musculoskeletal:         General: Tenderness present. No deformity. Normal range of motion.      Cervical back: Full passive range of motion without pain, normal range of motion and neck supple.      Left hip: Tenderness and bony tenderness present.   Lymphadenopathy:      Cervical: No cervical adenopathy.   Skin:     General: Skin is warm and dry.      Coloration: Skin is not pale.      Findings: No erythema or rash.   Neurological:      Mental Status: She is alert and oriented to person, place, and time.      GCS: GCS eye subscore is 4. GCS verbal subscore is 5. GCS motor subscore is 6.      Cranial Nerves: No cranial nerve deficit.      Sensory: No sensory deficit.   Psychiatric:         Speech: Speech normal.         Behavior: Behavior normal.         Thought Content: Thought content normal.         Judgment: Judgment normal.         Procedures           ED Course  ED Course as of 04/05/22 0134   e Apr 05, 2022   0001 XR Femur 2 View Left  IMPRESSION:  Negative left femur. [ES]   0002 XR Hip With or Without  Pelvis 2 - 3 View Left  IMPRESSION:  Negative left hip. [ES]   0002 CT Chest Without Contrast Diagnostic  IMPRESSION:  Mild cardiomegaly. No other acute chest findings. [ES]   0002 CT Abdomen Pelvis Without Contrast  IMPRESSION:     1. Indwelling left ureteral stent again noted. No change in indeterminate area of mixed calcification in soft tissue posterior to the left renal pelvis. This is of uncertain etiology and clinical significance. Consider nonemergent follow-up with MRI of  the abdomen or multiphase contrast-enhanced CT of the abdomen/pelvis.  2. Nonobstructing right intrarenal calculi.  3. Indwelling left lower quadrant abdominal drain with trace fluid versus soft tissue thickening in the left paracolic gutter, unchanged from the prior exam.  4. Normal appendix.  5. Moderate stool burden.  6. Hepatosplenomegaly.  7. Cholecystectomy. [ES]   0126 Patient is to continue taking the antibiotics prescribed on 4/2/2022 for suspected UTI and follow-up with her primary care physician.  She was instructed to immediately return the emergency department with any worsening symptoms. [ES]   0127 Extensive work-up done in the emergency room on this visit, no acute abnormalities.  Discussed in detail with the patient prior to discharge. [ES]      ED Course User Index  [ES] Martir Razo MD                                                 MDM  Number of Diagnoses or Management Options  Fall, initial encounter: new and requires workup  Left leg pain: new and requires workup     Amount and/or Complexity of Data Reviewed  Clinical lab tests: reviewed and ordered  Tests in the radiology section of CPT®: reviewed and ordered  Tests in the medicine section of CPT®: ordered and reviewed  Review and summarize past medical records: yes  Independent visualization of images, tracings, or specimens: yes    Risk of Complications, Morbidity, and/or Mortality  Presenting problems: moderate  Diagnostic procedures:  moderate  Management options: moderate    Patient Progress  Patient progress: stable      Final diagnoses:   Fall, initial encounter   Left leg pain       ED Disposition  ED Disposition     ED Disposition   Discharge    Condition   Stable    Comment   --             Eddie Latif, APRN  [address] [facility] [address] [phone]    Schedule an appointment as soon as possible for a visit in 1 day  EVALUATE         Medication List      New Prescriptions    HYDROcodone-acetaminophen 5-325 MG per tablet  Commonly known as: NORCO  Take 1 tablet by mouth Every 6 (Six) Hours As Needed for Severe Pain .           Where to Get Your Medications      These medications were sent to [place], [place] - [address] [place] [address] - [phone]  - [phone] [address] [address]    Phone: [phone]   · HYDROcodone-acetaminophen 5-325 MG per tablet          Martir Razo MD  04/05/22 0134

## 2022-04-05 NOTE — ED NOTES
MEDICAL SCREENING:    Reason for Visit: Leg Pain/Fall    Patient initially seen in triage.  The patient was advised further evaluation and diagnostic testing will be needed, some of the treatment and testing will be initiated in the lobby in order to begin the process.  The patient will be returned to the waiting area for the time being and possibly be re-assessed by a subsequent ED provider.  The patient will be brought back to the treatment area in as timely manner as possible.       Martir Razo MD  04/04/22 4886

## 2022-04-06 LAB
BACTERIA SPEC AEROBE CULT: ABNORMAL
BACTERIA SPEC AEROBE CULT: ABNORMAL
BACTERIA SPEC AEROBE CULT: NORMAL

## 2022-04-06 RX ORDER — FLUCONAZOLE 150 MG/1
150 TABLET ORAL ONCE
Qty: 1 TABLET | Refills: 0 | Status: SHIPPED | OUTPATIENT
Start: 2022-04-06 | End: 2022-04-06

## 2022-04-09 ENCOUNTER — HOSPITAL ENCOUNTER (EMERGENCY)
Facility: HOSPITAL | Age: 37
Discharge: LEFT BEFORE DISCHARGE | End: 2022-04-09
Attending: STUDENT IN AN ORGANIZED HEALTH CARE EDUCATION/TRAINING PROGRAM | Admitting: STUDENT IN AN ORGANIZED HEALTH CARE EDUCATION/TRAINING PROGRAM

## 2022-04-09 VITALS
HEART RATE: 107 BPM | TEMPERATURE: 98.5 F | SYSTOLIC BLOOD PRESSURE: 128 MMHG | BODY MASS INDEX: 40.97 KG/M2 | HEIGHT: 64 IN | RESPIRATION RATE: 18 BRPM | WEIGHT: 240 LBS | OXYGEN SATURATION: 98 % | DIASTOLIC BLOOD PRESSURE: 92 MMHG

## 2022-04-09 DIAGNOSIS — R10.9 FLANK PAIN: Primary | ICD-10-CM

## 2022-04-09 LAB
ALBUMIN SERPL-MCNC: 3.95 G/DL (ref 3.5–5.2)
ALBUMIN/GLOB SERPL: 1.1 G/DL
ALP SERPL-CCNC: 114 U/L (ref 39–117)
ALT SERPL W P-5'-P-CCNC: 13 U/L (ref 1–33)
AMPHET+METHAMPHET UR QL: NEGATIVE
AMPHETAMINES UR QL: NEGATIVE
ANION GAP SERPL CALCULATED.3IONS-SCNC: 16.5 MMOL/L (ref 5–15)
AST SERPL-CCNC: 15 U/L (ref 1–32)
BACTERIA UR QL AUTO: ABNORMAL /HPF
BARBITURATES UR QL SCN: NEGATIVE
BENZODIAZ UR QL SCN: NEGATIVE
BILIRUB SERPL-MCNC: 0.3 MG/DL (ref 0–1.2)
BILIRUB UR QL STRIP: NEGATIVE
BUN SERPL-MCNC: 17 MG/DL (ref 6–20)
BUN/CREAT SERPL: 22.7 (ref 7–25)
BUPRENORPHINE SERPL-MCNC: NEGATIVE NG/ML
CALCIUM SPEC-SCNC: 9.6 MG/DL (ref 8.6–10.5)
CANNABINOIDS SERPL QL: NEGATIVE
CHLORIDE SERPL-SCNC: 96 MMOL/L (ref 98–107)
CLARITY UR: ABNORMAL
CO2 SERPL-SCNC: 16.5 MMOL/L (ref 22–29)
COCAINE UR QL: NEGATIVE
COLOR UR: YELLOW
CREAT SERPL-MCNC: 0.75 MG/DL (ref 0.57–1)
D-LACTATE SERPL-SCNC: 4.2 MMOL/L (ref 0.5–2)
DACRYOCYTES BLD QL SMEAR: ABNORMAL
DEPRECATED RDW RBC AUTO: 46.7 FL (ref 37–54)
EGFRCR SERPLBLD CKD-EPI 2021: 106 ML/MIN/1.73
EOSINOPHIL # BLD MANUAL: 0.11 10*3/MM3 (ref 0–0.4)
EOSINOPHIL NFR BLD MANUAL: 2 % (ref 0.3–6.2)
ERYTHROCYTE [DISTWIDTH] IN BLOOD BY AUTOMATED COUNT: 13.6 % (ref 12.3–15.4)
GLOBULIN UR ELPH-MCNC: 3.7 GM/DL
GLUCOSE SERPL-MCNC: 246 MG/DL (ref 65–99)
GLUCOSE UR STRIP-MCNC: ABNORMAL MG/DL
HCT VFR BLD AUTO: 33.6 % (ref 34–46.6)
HGB BLD-MCNC: 11.5 G/DL (ref 12–15.9)
HGB UR QL STRIP.AUTO: ABNORMAL
HOLD SPECIMEN: NORMAL
HOLD SPECIMEN: NORMAL
HYALINE CASTS UR QL AUTO: ABNORMAL /LPF
KETONES UR QL STRIP: NEGATIVE
LEUKOCYTE ESTERASE UR QL STRIP.AUTO: ABNORMAL
LIPASE SERPL-CCNC: 37 U/L (ref 13–60)
LYMPHOCYTES # BLD MANUAL: 1.87 10*3/MM3 (ref 0.7–3.1)
LYMPHOCYTES NFR BLD MANUAL: 6 % (ref 5–12)
MCH RBC QN AUTO: 32.4 PG (ref 26.6–33)
MCHC RBC AUTO-ENTMCNC: 34.2 G/DL (ref 31.5–35.7)
MCV RBC AUTO: 94.6 FL (ref 79–97)
METAMYELOCYTES NFR BLD MANUAL: 1 % (ref 0–0)
METHADONE UR QL SCN: NEGATIVE
MONOCYTES # BLD: 0.32 10*3/MM3 (ref 0.1–0.9)
NEUTROPHILS # BLD AUTO: 2.98 10*3/MM3 (ref 1.7–7)
NEUTROPHILS NFR BLD MANUAL: 56 % (ref 42.7–76)
NITRITE UR QL STRIP: NEGATIVE
OPIATES UR QL: NEGATIVE
OXYCODONE UR QL SCN: POSITIVE
PCP UR QL SCN: NEGATIVE
PH UR STRIP.AUTO: <=5 [PH] (ref 5–8)
PLAT MORPH BLD: NORMAL
PLATELET # BLD AUTO: 209 10*3/MM3 (ref 140–450)
PMV BLD AUTO: 8.6 FL (ref 6–12)
POTASSIUM SERPL-SCNC: 4 MMOL/L (ref 3.5–5.2)
PROPOXYPH UR QL: NEGATIVE
PROT SERPL-MCNC: 7.6 G/DL (ref 6–8.5)
PROT UR QL STRIP: ABNORMAL
RBC # BLD AUTO: 3.55 10*6/MM3 (ref 3.77–5.28)
RBC # UR STRIP: ABNORMAL /HPF
REF LAB TEST METHOD: ABNORMAL
SCAN SLIDE: NORMAL
SODIUM SERPL-SCNC: 129 MMOL/L (ref 136–145)
SP GR UR STRIP: 1.02 (ref 1–1.03)
SQUAMOUS #/AREA URNS HPF: ABNORMAL /HPF
TRICYCLICS UR QL SCN: NEGATIVE
UROBILINOGEN UR QL STRIP: ABNORMAL
VARIANT LYMPHS NFR BLD MANUAL: 35 % (ref 19.6–45.3)
WBC # UR STRIP: ABNORMAL /HPF
WBC NRBC COR # BLD: 5.33 10*3/MM3 (ref 3.4–10.8)
WHOLE BLOOD HOLD SPECIMEN: NORMAL
WHOLE BLOOD HOLD SPECIMEN: NORMAL
YEAST URNS QL MICRO: ABNORMAL /HPF

## 2022-04-09 PROCEDURE — 83605 ASSAY OF LACTIC ACID: CPT | Performed by: STUDENT IN AN ORGANIZED HEALTH CARE EDUCATION/TRAINING PROGRAM

## 2022-04-09 PROCEDURE — 96374 THER/PROPH/DIAG INJ IV PUSH: CPT

## 2022-04-09 PROCEDURE — 83690 ASSAY OF LIPASE: CPT | Performed by: STUDENT IN AN ORGANIZED HEALTH CARE EDUCATION/TRAINING PROGRAM

## 2022-04-09 PROCEDURE — 99283 EMERGENCY DEPT VISIT LOW MDM: CPT

## 2022-04-09 PROCEDURE — 81001 URINALYSIS AUTO W/SCOPE: CPT | Performed by: STUDENT IN AN ORGANIZED HEALTH CARE EDUCATION/TRAINING PROGRAM

## 2022-04-09 PROCEDURE — 80053 COMPREHEN METABOLIC PANEL: CPT | Performed by: STUDENT IN AN ORGANIZED HEALTH CARE EDUCATION/TRAINING PROGRAM

## 2022-04-09 PROCEDURE — 36415 COLL VENOUS BLD VENIPUNCTURE: CPT

## 2022-04-09 PROCEDURE — 85025 COMPLETE CBC W/AUTO DIFF WBC: CPT | Performed by: STUDENT IN AN ORGANIZED HEALTH CARE EDUCATION/TRAINING PROGRAM

## 2022-04-09 PROCEDURE — 96360 HYDRATION IV INFUSION INIT: CPT

## 2022-04-09 PROCEDURE — 80306 DRUG TEST PRSMV INSTRMNT: CPT | Performed by: STUDENT IN AN ORGANIZED HEALTH CARE EDUCATION/TRAINING PROGRAM

## 2022-04-09 PROCEDURE — 85007 BL SMEAR W/DIFF WBC COUNT: CPT | Performed by: STUDENT IN AN ORGANIZED HEALTH CARE EDUCATION/TRAINING PROGRAM

## 2022-04-09 PROCEDURE — 25010000002 DEXAMETHASONE PER 1 MG: Performed by: STUDENT IN AN ORGANIZED HEALTH CARE EDUCATION/TRAINING PROGRAM

## 2022-04-09 RX ORDER — SODIUM CHLORIDE 0.9 % (FLUSH) 0.9 %
10 SYRINGE (ML) INJECTION AS NEEDED
Status: DISCONTINUED | OUTPATIENT
Start: 2022-04-09 | End: 2022-04-09 | Stop reason: HOSPADM

## 2022-04-09 RX ORDER — ACETAMINOPHEN 500 MG
1000 TABLET ORAL ONCE
Status: COMPLETED | OUTPATIENT
Start: 2022-04-09 | End: 2022-04-09

## 2022-04-09 RX ORDER — DEXAMETHASONE SODIUM PHOSPHATE 4 MG/ML
8 INJECTION, SOLUTION INTRA-ARTICULAR; INTRALESIONAL; INTRAMUSCULAR; INTRAVENOUS; SOFT TISSUE ONCE
Status: COMPLETED | OUTPATIENT
Start: 2022-04-09 | End: 2022-04-09

## 2022-04-09 RX ADMIN — DEXAMETHASONE SODIUM PHOSPHATE 8 MG: 4 INJECTION, SOLUTION INTRA-ARTICULAR; INTRALESIONAL; INTRAMUSCULAR; INTRAVENOUS; SOFT TISSUE at 10:14

## 2022-04-09 RX ADMIN — SODIUM CHLORIDE 1000 ML: 9 INJECTION, SOLUTION INTRAVENOUS at 11:01

## 2022-04-09 RX ADMIN — ACETAMINOPHEN 1000 MG: 500 TABLET ORAL at 10:14

## 2022-04-09 NOTE — ED PROVIDER NOTES
Subjective   37-year-old female with past medical history of diabetes, hypertension, depression, and atrial fibrillation presents to the ER with a primary complaint of right-sided flank pain.  Patient has been scanned multiple times.  Patient has been scanned at least once a week for the past several weeks.  Patient has a history of a left-sided nephrostomy tube that was recently removed.  Patient denied left-sided flank pain today.  Patient had sudden onset right-sided flank pain.  Patient's most recent CT of the abdomen pelvis within the last 7 days notes a nonobstructing right-sided renal pelvic stone.  Denied fever chills.  Denied nausea or vomiting.  Denied chest pain or shortness of breath.  Vitals stable.  Afebrile          Review of Systems   Genitourinary: Positive for flank pain.   All other systems reviewed and are negative.      Past Medical History:   Diagnosis Date   • A-fib (HCC)    • Abnormal ECG    • Anemia    • Anxiety    • Asthma    • Cancer (HCC)     Ovarian   • Depression    • Diabetes mellitus (HCC)    • DVT (deep venous thrombosis) (HCC)    • Factor 5 Leiden mutation, heterozygous (HCC)    • Fibroid    • GERD (gastroesophageal reflux disease)    • Gout    • H/O abdominal abscess    • Hyperlipidemia    • Hypothyroid    • Kidney stone    • Migraines    • Neuropathy    • Ovarian cancer (HCC) 2021   • Ovarian cyst    • PE (pulmonary embolism)    • Polycystic ovary syndrome    • Preeclampsia    • Rh incompatibility    • Stroke (HCC)    • TIA (transient ischemic attack)    • Urinary tract infection    • Varicella        Allergies   Allergen Reactions   • Toradol [Ketorolac Tromethamine] Anaphylaxis and Hives   • Haldol [Haloperidol] Hives and Mental Status Change   • Tramadol Hives and Swelling   • Amoxicillin Hives and Rash   • Penicillins Hives and Rash       Past Surgical History:   Procedure Laterality Date   • ABDOMINAL SURGERY     • CARDIAC CATHETERIZATION     •  SECTION     •  "CHOLECYSTECTOMY     • COLONOSCOPY     • ENDOSCOPY     • EXTRACORPOREAL SHOCK WAVE LITHOTRIPSY (ESWL) Left 10/22/2021    Procedure: EXTRACORPOREAL SHOCKWAVE LITHOTRIPSY;  Surgeon: Milan Motley MD;  Location: [facility];  Service: Urology;  Laterality: Left;   • LITHOTRIPSY     • RIGHT OOPHORECTOMY     • URETEROSCOPY LASER LITHOTRIPSY WITH STENT INSERTION Left 10/01/2021    Procedure: URETEROSCOPY WITH STENT PLACEMENT;  Surgeon: Milan Motley MD;  Location: [facility];  Service: Urology;  Laterality: Left;       Family History   Problem Relation Age of Onset   • Hypertension Mother    • Diabetes Father    • Hypertension Father    • Heart attack Father    • No Known Problems Sister    • No Known Problems Brother    • No Known Problems Son    • No Known Problems Daughter    • No Known Problems Maternal Grandmother    • No Known Problems Maternal Grandfather    • No Known Problems Paternal Grandmother    • No Known Problems Paternal Grandfather    • No Known Problems Cousin    • Rheum arthritis Neg Hx    • Osteoarthritis Neg Hx    • Asthma Neg Hx    • Heart failure Neg Hx    • Hyperlipidemia Neg Hx    • Migraines Neg Hx    • Rashes / Skin problems Neg Hx    • Seizures Neg Hx    • Stroke Neg Hx    • Thyroid disease Neg Hx        Social History     Socioeconomic History   • Marital status:    Tobacco Use   • Smoking status: Never Smoker   • Smokeless tobacco: Never Used   Vaping Use   • Vaping Use: Never used   Substance and Sexual Activity   • Alcohol use: No   • Drug use: Never     Comment: Pt has track marks on right arm. Pt states \"It's from my INR being drawn.\"    • Sexual activity: Not Currently     Partners: Male     Birth control/protection: None           Objective   Physical Exam  Constitutional:       General: She is not in acute distress.     Appearance: Normal appearance. She is not ill-appearing.   HENT:      Head: Normocephalic and atraumatic.      Right Ear: External ear normal. "      Left Ear: External ear normal.      Nose: Nose normal.      Mouth/Throat:      Mouth: Mucous membranes are moist.   Eyes:      Extraocular Movements: Extraocular movements intact.      Pupils: Pupils are equal, round, and reactive to light.   Cardiovascular:      Rate and Rhythm: Normal rate and regular rhythm.      Heart sounds: No murmur heard.  Pulmonary:      Effort: Pulmonary effort is normal. No respiratory distress.      Breath sounds: Normal breath sounds. No wheezing.   Abdominal:      General: Bowel sounds are normal.      Palpations: Abdomen is soft.      Tenderness: There is no abdominal tenderness. There is right CVA tenderness.      Comments: Status post left-sided nephrostomy tube removal.  Dressing in place.  No drainage or erythema   Musculoskeletal:         General: No deformity or signs of injury. Normal range of motion.      Cervical back: Normal range of motion and neck supple.   Skin:     General: Skin is warm and dry.      Findings: No erythema.   Neurological:      General: No focal deficit present.      Mental Status: She is alert and oriented to person, place, and time. Mental status is at baseline.      Cranial Nerves: No cranial nerve deficit.   Psychiatric:         Mood and Affect: Mood normal.         Behavior: Behavior normal.         Thought Content: Thought content normal.         Procedures           ED Course  ED Course as of 04/09/22 1145   Sat Apr 09, 2022   1143 Elevated lactic acid noted.  CBC and CCP are unremarkable.  Urinalysis is still pending.  Patient is at risk for opiate abuse given multiple visits over the last several days.  Patient requested pain medicine.  Tylenol was given.  Decadron was given.  Patient expressed her displeasure for this medication selection requested pain medication.  I discussed with the patient that pain medication is not necessary at this point time and I would not be doing repeat imaging given multiple/several previous images.  She has  also had an image done within the last 7 days.  Patient became very agitated and wished to leave if we were not going to treat her pain correctly.  Patient informed the staff that they wished to leave against medical advice.  The risks of this decision were discussed throughly with the patient.  The risks included, but were not limited to worsening medical status, sepsis, shock, respiratory failure, severe neurological deficit, and cardiac death.  The patient expressed full understanding of the risk of this decision.  Patient was counseled to immediately return to the ER if symptoms worsened, and to follow up with their PCP promptly.  Vitals guarded at AMA. [SF]      ED Course User Index  [SF] David Mccoy DO                                                 [facility]    Final diagnoses:   Flank pain       ED Disposition  ED Disposition     ED Disposition   AMA    Condition   --    Comment   --             Eddie Latif, APRN  [address] [facility] [address]  [phone]    In 1 week      [facility] Emergency Department  [address] [place] [id number] [phone]    If symptoms worsen         Medication List      No changes were made to your prescriptions during this visit.          David Mccoy DO  04/09/22 1145

## 2022-04-09 NOTE — ED NOTES
Patient requesting to leave. Patient informed she would have to sign out against medical advise. Patient is agreeable to this. Patient refusing to sit in lobby and wait for her taxi. She states she wants to sit outside, because no one will help her out if she waits in the lobby. Lead RN and Provider aware.

## 2022-04-10 ENCOUNTER — HOSPITAL ENCOUNTER (EMERGENCY)
Facility: HOSPITAL | Age: 37
Discharge: LEFT BEFORE DISCHARGE | End: 2022-04-10
Attending: STUDENT IN AN ORGANIZED HEALTH CARE EDUCATION/TRAINING PROGRAM | Admitting: STUDENT IN AN ORGANIZED HEALTH CARE EDUCATION/TRAINING PROGRAM

## 2022-04-10 ENCOUNTER — APPOINTMENT (OUTPATIENT)
Dept: GENERAL RADIOLOGY | Facility: HOSPITAL | Age: 37
End: 2022-04-10

## 2022-04-10 VITALS
HEART RATE: 100 BPM | OXYGEN SATURATION: 99 % | WEIGHT: 240 LBS | DIASTOLIC BLOOD PRESSURE: 96 MMHG | SYSTOLIC BLOOD PRESSURE: 143 MMHG | TEMPERATURE: 98.2 F | RESPIRATION RATE: 19 BRPM | BODY MASS INDEX: 40.97 KG/M2 | HEIGHT: 64 IN

## 2022-04-10 DIAGNOSIS — N12 PYELONEPHRITIS: Primary | ICD-10-CM

## 2022-04-10 LAB
A-A DO2: 16 MMHG (ref 0–300)
ACETONE BLD QL: NEGATIVE
ALBUMIN SERPL-MCNC: 4.48 G/DL (ref 3.5–5.2)
ALBUMIN/GLOB SERPL: 1.1 G/DL
ALP SERPL-CCNC: 126 U/L (ref 39–117)
ALT SERPL W P-5'-P-CCNC: 24 U/L (ref 1–33)
ANION GAP SERPL CALCULATED.3IONS-SCNC: 21.6 MMOL/L (ref 5–15)
ARTERIAL PATENCY WRIST A: ABNORMAL
AST SERPL-CCNC: 15 U/L (ref 1–32)
ATMOSPHERIC PRESS: 727 MMHG
BASE EXCESS BLDA CALC-SCNC: -6 MMOL/L (ref 0–2)
BASOPHILS # BLD AUTO: 0.02 10*3/MM3 (ref 0–0.2)
BASOPHILS NFR BLD AUTO: 0.2 % (ref 0–1.5)
BDY SITE: ABNORMAL
BILIRUB SERPL-MCNC: 0.3 MG/DL (ref 0–1.2)
BODY TEMPERATURE: 0 C
BUN SERPL-MCNC: 22 MG/DL (ref 6–20)
BUN/CREAT SERPL: 27.2 (ref 7–25)
CALCIUM SPEC-SCNC: 10.2 MG/DL (ref 8.6–10.5)
CHLORIDE SERPL-SCNC: 92 MMOL/L (ref 98–107)
CO2 BLDA-SCNC: 19.2 MMOL/L (ref 22–33)
CO2 SERPL-SCNC: 14.4 MMOL/L (ref 22–29)
COHGB MFR BLD: 1.2 % (ref 0–5)
CREAT SERPL-MCNC: 0.81 MG/DL (ref 0.57–1)
CRP SERPL-MCNC: 1.26 MG/DL (ref 0–0.5)
DEPRECATED RDW RBC AUTO: 44.8 FL (ref 37–54)
EGFRCR SERPLBLD CKD-EPI 2021: 96.6 ML/MIN/1.73
EOSINOPHIL # BLD AUTO: 0 10*3/MM3 (ref 0–0.4)
EOSINOPHIL NFR BLD AUTO: 0 % (ref 0.3–6.2)
ERYTHROCYTE [DISTWIDTH] IN BLOOD BY AUTOMATED COUNT: 13.6 % (ref 12.3–15.4)
GLOBULIN UR ELPH-MCNC: 4.2 GM/DL
GLUCOSE SERPL-MCNC: 309 MG/DL (ref 65–99)
HBA1C MFR BLD: 5.6 % (ref 4.8–5.6)
HCG SERPL QL: NEGATIVE
HCO3 BLDA-SCNC: 18.2 MMOL/L (ref 20–26)
HCT VFR BLD AUTO: 35.1 % (ref 34–46.6)
HCT VFR BLD CALC: 37.9 % (ref 38–51)
HGB BLD-MCNC: 12.2 G/DL (ref 12–15.9)
HGB BLDA-MCNC: 12.4 G/DL (ref 13.5–17.5)
HOLD SPECIMEN: NORMAL
HOLD SPECIMEN: NORMAL
IMM GRANULOCYTES # BLD AUTO: 0.04 10*3/MM3 (ref 0–0.05)
IMM GRANULOCYTES NFR BLD AUTO: 0.5 % (ref 0–0.5)
INHALED O2 CONCENTRATION: 21 %
LYMPHOCYTES # BLD AUTO: 1.27 10*3/MM3 (ref 0.7–3.1)
LYMPHOCYTES NFR BLD AUTO: 15.5 % (ref 19.6–45.3)
Lab: ABNORMAL
MAGNESIUM SERPL-MCNC: 1.6 MG/DL (ref 1.6–2.6)
MCH RBC QN AUTO: 31.9 PG (ref 26.6–33)
MCHC RBC AUTO-ENTMCNC: 34.8 G/DL (ref 31.5–35.7)
MCV RBC AUTO: 91.6 FL (ref 79–97)
METHGB BLD QL: 0.2 % (ref 0–3)
MODALITY: ABNORMAL
MONOCYTES # BLD AUTO: 0.27 10*3/MM3 (ref 0.1–0.9)
MONOCYTES NFR BLD AUTO: 3.3 % (ref 5–12)
NEUTROPHILS NFR BLD AUTO: 6.57 10*3/MM3 (ref 1.7–7)
NEUTROPHILS NFR BLD AUTO: 80.5 % (ref 42.7–76)
NOTE: ABNORMAL
NOTIFIED BY: ABNORMAL
NOTIFIED WHO: ABNORMAL
NRBC BLD AUTO-RTO: 0 /100 WBC (ref 0–0.2)
OXYHGB MFR BLDV: 96.8 % (ref 94–99)
PCO2 BLDA: 31.2 MM HG (ref 35–45)
PCO2 TEMP ADJ BLD: ABNORMAL MM[HG]
PH BLDA: 7.38 PH UNITS (ref 7.35–7.45)
PH, TEMP CORRECTED: ABNORMAL
PHOSPHATE SERPL-MCNC: 2.9 MG/DL (ref 2.5–4.5)
PLATELET # BLD AUTO: 278 10*3/MM3 (ref 140–450)
PMV BLD AUTO: 8.5 FL (ref 6–12)
PO2 BLDA: 91.7 MM HG (ref 83–108)
PO2 TEMP ADJ BLD: ABNORMAL MM[HG]
POTASSIUM SERPL-SCNC: 4.5 MMOL/L (ref 3.5–5.2)
PROT SERPL-MCNC: 8.7 G/DL (ref 6–8.5)
RBC # BLD AUTO: 3.83 10*6/MM3 (ref 3.77–5.28)
SAO2 % BLDCOA: 98.2 % (ref 94–99)
SODIUM SERPL-SCNC: 128 MMOL/L (ref 136–145)
VENTILATOR MODE: ABNORMAL
WBC NRBC COR # BLD: 8.17 10*3/MM3 (ref 3.4–10.8)
WHOLE BLOOD HOLD SPECIMEN: NORMAL
WHOLE BLOOD HOLD SPECIMEN: NORMAL

## 2022-04-10 PROCEDURE — 36600 WITHDRAWAL OF ARTERIAL BLOOD: CPT

## 2022-04-10 PROCEDURE — 82375 ASSAY CARBOXYHB QUANT: CPT

## 2022-04-10 PROCEDURE — 82009 KETONE BODYS QUAL: CPT | Performed by: STUDENT IN AN ORGANIZED HEALTH CARE EDUCATION/TRAINING PROGRAM

## 2022-04-10 PROCEDURE — 83036 HEMOGLOBIN GLYCOSYLATED A1C: CPT | Performed by: STUDENT IN AN ORGANIZED HEALTH CARE EDUCATION/TRAINING PROGRAM

## 2022-04-10 PROCEDURE — 84703 CHORIONIC GONADOTROPIN ASSAY: CPT | Performed by: STUDENT IN AN ORGANIZED HEALTH CARE EDUCATION/TRAINING PROGRAM

## 2022-04-10 PROCEDURE — 82805 BLOOD GASES W/O2 SATURATION: CPT

## 2022-04-10 PROCEDURE — 83735 ASSAY OF MAGNESIUM: CPT | Performed by: STUDENT IN AN ORGANIZED HEALTH CARE EDUCATION/TRAINING PROGRAM

## 2022-04-10 PROCEDURE — 86140 C-REACTIVE PROTEIN: CPT | Performed by: NURSE PRACTITIONER

## 2022-04-10 PROCEDURE — 96360 HYDRATION IV INFUSION INIT: CPT

## 2022-04-10 PROCEDURE — 83050 HGB METHEMOGLOBIN QUAN: CPT

## 2022-04-10 PROCEDURE — 85025 COMPLETE CBC W/AUTO DIFF WBC: CPT | Performed by: NURSE PRACTITIONER

## 2022-04-10 PROCEDURE — 74022 RADEX COMPL AQT ABD SERIES: CPT

## 2022-04-10 PROCEDURE — 84100 ASSAY OF PHOSPHORUS: CPT | Performed by: STUDENT IN AN ORGANIZED HEALTH CARE EDUCATION/TRAINING PROGRAM

## 2022-04-10 PROCEDURE — 80053 COMPREHEN METABOLIC PANEL: CPT | Performed by: NURSE PRACTITIONER

## 2022-04-10 RX ORDER — SODIUM CHLORIDE 0.9 % (FLUSH) 0.9 %
10 SYRINGE (ML) INJECTION ONCE AS NEEDED
Status: DISCONTINUED | OUTPATIENT
Start: 2022-04-10 | End: 2022-04-10

## 2022-04-10 RX ORDER — SODIUM CHLORIDE 0.9 % (FLUSH) 0.9 %
10 SYRINGE (ML) INJECTION EVERY 12 HOURS SCHEDULED
Status: DISCONTINUED | OUTPATIENT
Start: 2022-04-10 | End: 2022-04-10

## 2022-04-10 RX ORDER — DEXTROSE, SODIUM CHLORIDE, AND POTASSIUM CHLORIDE 5; .45; .15 G/100ML; G/100ML; G/100ML
150 INJECTION INTRAVENOUS CONTINUOUS PRN
Status: DISCONTINUED | OUTPATIENT
Start: 2022-04-10 | End: 2022-04-10

## 2022-04-10 RX ORDER — SODIUM CHLORIDE 9 MG/ML
250 INJECTION, SOLUTION INTRAVENOUS CONTINUOUS PRN
Status: DISCONTINUED | OUTPATIENT
Start: 2022-04-10 | End: 2022-04-10

## 2022-04-10 RX ORDER — ONDANSETRON 2 MG/ML
4 INJECTION INTRAMUSCULAR; INTRAVENOUS ONCE AS NEEDED
Status: DISCONTINUED | OUTPATIENT
Start: 2022-04-10 | End: 2022-04-10

## 2022-04-10 RX ORDER — SODIUM CHLORIDE 0.9 % (FLUSH) 0.9 %
10 SYRINGE (ML) INJECTION AS NEEDED
Status: DISCONTINUED | OUTPATIENT
Start: 2022-04-10 | End: 2022-04-10

## 2022-04-10 RX ORDER — DEXTROSE AND SODIUM CHLORIDE 5; .9 G/100ML; G/100ML
150 INJECTION, SOLUTION INTRAVENOUS CONTINUOUS PRN
Status: DISCONTINUED | OUTPATIENT
Start: 2022-04-10 | End: 2022-04-10

## 2022-04-10 RX ORDER — DEXTROSE MONOHYDRATE 25 G/50ML
12.5 INJECTION, SOLUTION INTRAVENOUS AS NEEDED
Status: DISCONTINUED | OUTPATIENT
Start: 2022-04-10 | End: 2022-04-10

## 2022-04-10 RX ORDER — DEXTROSE AND SODIUM CHLORIDE 5; .45 G/100ML; G/100ML
150 INJECTION, SOLUTION INTRAVENOUS CONTINUOUS PRN
Status: DISCONTINUED | OUTPATIENT
Start: 2022-04-10 | End: 2022-04-10

## 2022-04-10 RX ORDER — SODIUM CHLORIDE 450 MG/100ML
250 INJECTION, SOLUTION INTRAVENOUS CONTINUOUS PRN
Status: DISCONTINUED | OUTPATIENT
Start: 2022-04-10 | End: 2022-04-10

## 2022-04-10 RX ORDER — DEXTROSE, SODIUM CHLORIDE, AND POTASSIUM CHLORIDE 5; .9; .15 G/100ML; G/100ML; G/100ML
150 INJECTION INTRAVENOUS CONTINUOUS PRN
Status: DISCONTINUED | OUTPATIENT
Start: 2022-04-10 | End: 2022-04-10

## 2022-04-10 RX ORDER — SODIUM CHLORIDE AND POTASSIUM CHLORIDE 300; 900 MG/100ML; MG/100ML
250 INJECTION, SOLUTION INTRAVENOUS CONTINUOUS PRN
Status: DISCONTINUED | OUTPATIENT
Start: 2022-04-10 | End: 2022-04-10

## 2022-04-10 RX ORDER — SODIUM CHLORIDE AND POTASSIUM CHLORIDE 150; 450 MG/100ML; MG/100ML
250 INJECTION, SOLUTION INTRAVENOUS CONTINUOUS PRN
Status: DISCONTINUED | OUTPATIENT
Start: 2022-04-10 | End: 2022-04-10

## 2022-04-10 RX ORDER — SULFAMETHOXAZOLE AND TRIMETHOPRIM 800; 160 MG/1; MG/1
1 TABLET ORAL 2 TIMES DAILY
Qty: 28 TABLET | Refills: 0 | Status: SHIPPED | OUTPATIENT
Start: 2022-04-10 | End: 2022-04-25

## 2022-04-10 RX ORDER — SODIUM CHLORIDE AND POTASSIUM CHLORIDE 150; 900 MG/100ML; MG/100ML
250 INJECTION, SOLUTION INTRAVENOUS CONTINUOUS PRN
Status: DISCONTINUED | OUTPATIENT
Start: 2022-04-10 | End: 2022-04-10

## 2022-04-10 RX ORDER — SODIUM CHLORIDE 9 MG/ML
10 INJECTION, SOLUTION INTRAVENOUS CONTINUOUS PRN
Status: DISCONTINUED | OUTPATIENT
Start: 2022-04-10 | End: 2022-04-10

## 2022-04-10 RX ORDER — POTASSIUM CHLORIDE, DEXTROSE MONOHYDRATE AND SODIUM CHLORIDE 300; 5; 900 MG/100ML; G/100ML; MG/100ML
150 INJECTION, SOLUTION INTRAVENOUS CONTINUOUS PRN
Status: DISCONTINUED | OUTPATIENT
Start: 2022-04-10 | End: 2022-04-10

## 2022-04-10 RX ORDER — HYDROCODONE BITARTRATE AND ACETAMINOPHEN 10; 325 MG/1; MG/1
1 TABLET ORAL ONCE
Status: DISCONTINUED | OUTPATIENT
Start: 2022-04-10 | End: 2022-04-10 | Stop reason: HOSPADM

## 2022-04-10 RX ORDER — DEXTROSE, SODIUM CHLORIDE, AND POTASSIUM CHLORIDE 5; .45; .3 G/100ML; G/100ML; G/100ML
150 INJECTION INTRAVENOUS CONTINUOUS PRN
Status: DISCONTINUED | OUTPATIENT
Start: 2022-04-10 | End: 2022-04-10

## 2022-04-10 RX ADMIN — SODIUM CHLORIDE 2000 ML: 9 INJECTION, SOLUTION INTRAVENOUS at 01:50

## 2022-04-10 NOTE — ED PROVIDER NOTES
[facility]  emergency department encounter        Pt Name: Natalia Arzate  MRN: [id number]  Birthdate 1985  Date of evaluation: 4/10/2022    Chief Complaint   Patient presents with   • Flank Pain   • Back Pain             HISTORY OF PRESENT ILLNESS:   Natalia Arzate is a 37 y.o. female presented initially complaining of bilateral flank pain.  During interview patient family concerned about right flank pain rating down her groin that was new onset the day prior.  Patient believes this is a kidney stone.  Endorses dysuria.  Patient reports she was discharged from [facility] 3 days prior with doxycycline for UTI.  Patient reports compliance with medication.  At visit  patient had CT imaging which noted nephrolithiasis in the right renal pelvis, however this stone appears present throughout numerous prior CT scans.  Patient has had 10 abdominal CT scans in the past 40 days.  She was seen in this department earlier the same day but left AMA.  She had UA pending at that time which resulted positive for UTI.        No other exacerbating or alleviating factors other than as noted above.  Severity: Severe    PCP: Eddie Latif APRN          REVIEW OF SYSTEMS:     Review of Systems   Constitutional: Negative for fever.   HENT: Negative for congestion and rhinorrhea.    Eyes: Negative for visual disturbance.   Respiratory: Negative for cough and shortness of breath.    Cardiovascular: Negative for chest pain.   Gastrointestinal: Positive for abdominal pain (Right lower quadrant radiating from flank) and nausea. Negative for vomiting.   Genitourinary: Positive for dysuria and flank pain.   Musculoskeletal: Negative for myalgias.   Skin: Negative for rash.   Neurological: Negative for headaches.   Psychiatric/Behavioral: Negative for confusion.       Review of systems otherwise as per HPI.          PREVIOUS HISTORY:         Past Medical History:   Diagnosis Date   • A-fib (HCC)    • Abnormal  "ECG    • Anemia    • Anxiety    • Asthma    • Cancer (HCC)     Ovarian   • Depression    • Diabetes mellitus (HCC)    • DVT (deep venous thrombosis) (HCC)    • Factor 5 Leiden mutation, heterozygous (HCC)    • Fibroid    • GERD (gastroesophageal reflux disease)    • Gout    • H/O abdominal abscess    • Hyperlipidemia    • Hypothyroid    • Kidney stone    • Migraines    • Neuropathy    • Ovarian cancer (HCC) 2021   • Ovarian cyst    • PE (pulmonary embolism)    • Polycystic ovary syndrome    • Preeclampsia    • Rh incompatibility    • Stroke (HCC)    • TIA (transient ischemic attack)    • Urinary tract infection    • Varicella            Past Surgical History:   Procedure Laterality Date   • ABDOMINAL SURGERY     • CARDIAC CATHETERIZATION     •  SECTION     • CHOLECYSTECTOMY     • COLONOSCOPY     • ENDOSCOPY     • EXTRACORPOREAL SHOCK WAVE LITHOTRIPSY (ESWL) Left 10/22/2021    Procedure: EXTRACORPOREAL SHOCKWAVE LITHOTRIPSY;  Surgeon: Milan Motley MD;  Location: [facility];  Service: Urology;  Laterality: Left;   • LITHOTRIPSY     • RIGHT OOPHORECTOMY     • URETEROSCOPY LASER LITHOTRIPSY WITH STENT INSERTION Left 10/01/2021    Procedure: URETEROSCOPY WITH STENT PLACEMENT;  Surgeon: Milan Motley MD;  Location: [facility];  Service: Urology;  Laterality: Left;           Social History     Socioeconomic History   • Marital status:    Tobacco Use   • Smoking status: Never Smoker   • Smokeless tobacco: Never Used   Vaping Use   • Vaping Use: Never used   Substance and Sexual Activity   • Alcohol use: No   • Drug use: Never     Comment: Pt has track marks on right arm. Pt states \"It's from my INR being drawn.\"    • Sexual activity: Not Currently     Partners: Male     Birth control/protection: None           Family History   Problem Relation Age of Onset   • Hypertension Mother    • Diabetes Father    • Hypertension Father    • Heart attack Father    • No Known Problems Sister    • No " Known Problems Brother    • No Known Problems Son    • No Known Problems Daughter    • No Known Problems Maternal Grandmother    • No Known Problems Maternal Grandfather    • No Known Problems Paternal Grandmother    • No Known Problems Paternal Grandfather    • No Known Problems Cousin    • Rheum arthritis Neg Hx    • Osteoarthritis Neg Hx    • Asthma Neg Hx    • Heart failure Neg Hx    • Hyperlipidemia Neg Hx    • Migraines Neg Hx    • Rashes / Skin problems Neg Hx    • Seizures Neg Hx    • Stroke Neg Hx    • Thyroid disease Neg Hx          Current Outpatient Medications   Medication Instructions   • amLODIPine (NORVASC) 10 mg, Oral, Daily   • apixaban (ELIQUIS) 5 mg, Oral, 2 Times Daily   • cyanocobalamin ([employer] VITAMIN B-12) 2,000 mcg, Oral, Daily   • DULoxetine (CYMBALTA) 20 mg, Oral, Daily   • gabapentin (NEURONTIN) 300 mg, Oral, 2 Times Daily   • HYDROcodone-acetaminophen (NORCO) 5-325 MG per tablet 1 tablet, Oral, Every 6 Hours PRN   • insulin glargine (LANTUS, SEMGLEE) 25 Units, Subcutaneous, Daily   • metoprolol succinate XL (TOPROL-XL) 25 mg, Oral, Nightly   • naloxone (NARCAN) 4 MG/0.1ML nasal spray 1 spray, Nasal, As Needed   • ondansetron ODT (ZOFRAN-ODT) 4 mg, Translingual, Every 8 Hours PRN   • Ozempic (0.25 or 0.5 MG/DOSE) 0.25 mg, Subcutaneous, Weekly, Prior to [facility] Admission, Patient was on:  takes on sundays   • promethazine (PHENERGAN) 25 mg, Oral, Every 6 Hours PRN   • sulfamethoxazole-trimethoprim (BACTRIM DS,SEPTRA DS) 800-160 MG per tablet 1 tablet, Oral, 2 Times Daily   • vitamin D (ERGOCALCIFEROL) 50,000 Units, Oral, Weekly, Prior to [facility] Admission, Patient was on:  takes on saturday         Allergies:  Toradol [ketorolac tromethamine], Haldol [haloperidol], Tramadol, Amoxicillin, and Penicillins    Medications, allergies and past medical, surgical, family, and social history reviewed.        PHYSICAL EXAM:     Physical Exam  Vitals and nursing note reviewed.   Constitutional:        General: She is not in acute distress.     Appearance: She is obese. She is not ill-appearing or toxic-appearing.      Comments: Well-appearing   HENT:      Head: Normocephalic and atraumatic.   Eyes:      Extraocular Movements: Extraocular movements intact.      Conjunctiva/sclera: Conjunctivae normal.   Cardiovascular:      Rate and Rhythm: Regular rhythm. Tachycardia present.   Pulmonary:      Effort: Pulmonary effort is normal. No respiratory distress.   Abdominal:      General: Abdomen is flat. There is no distension.      Palpations: Abdomen is soft.      Tenderness: There is no abdominal tenderness. There is no right CVA tenderness, left CVA tenderness, guarding or rebound.   Musculoskeletal:         General: No deformity. Normal range of motion.      Cervical back: Normal range of motion. No rigidity.   Neurological:      General: No focal deficit present.      Mental Status: She is alert and oriented to person, place, and time.   Psychiatric:         Mood and Affect: Mood normal.         Behavior: Behavior normal.             COMPLETED DIAGNOSTIC STUDIES AND INTERVENTIONS:     Lab Results (last 24 hours)     Procedure Component Value Units Date/Time    CBC & Differential [[id number]]  (Abnormal) Collected: 04/10/22 0024    Specimen: Blood from Arm, Left Updated: 04/10/22 0032    Narrative:      The following orders were created for panel order CBC & Differential.  Procedure                               Abnormality         Status                     ---------                               -----------         ------                     CBC Auto Differential[[id number]]        Abnormal            Final result                 Please view results for these tests on the individual orders.    Comprehensive Metabolic Panel [[id number]]  (Abnormal) Collected: 04/10/22 0024    Specimen: Blood from Arm, Left Updated: 04/10/22 0053     Glucose 309 mg/dL      BUN 22 mg/dL      Creatinine 0.81 mg/dL      Sodium 128 mmol/L       Potassium 4.5 mmol/L      Comment: Slight hemolysis detected by analyzer. Results may be affected.        Chloride 92 mmol/L      CO2 14.4 mmol/L      Calcium 10.2 mg/dL      Total Protein 8.7 g/dL      Albumin 4.48 g/dL      ALT (SGPT) 24 U/L      AST (SGOT) 15 U/L      Alkaline Phosphatase 126 U/L      Total Bilirubin 0.3 mg/dL      Globulin 4.2 gm/dL      A/G Ratio 1.1 g/dL      BUN/Creatinine Ratio 27.2     Anion Gap 21.6 mmol/L      eGFR 96.6 mL/min/1.73      Comment: National Kidney Foundation and American Society of Nephrology (ASN) Task Force recommended calculation based on the Chronic Kidney Disease Epidemiology Collaboration (CKD-EPI) equation refit without adjustment for race.       Narrative:      GFR Normal >60  Chronic Kidney Disease <60  Kidney Failure <15      C-reactive Protein [[id number]]  (Abnormal) Collected: 04/10/22 0024    Specimen: Blood from Arm, Left Updated: 04/10/22 0053     C-Reactive Protein 1.26 mg/dL     CBC Auto Differential [[id number]]  (Abnormal) Collected: 04/10/22 0024    Specimen: Blood from Arm, Left Updated: 04/10/22 0032     WBC 8.17 10*3/mm3      RBC 3.83 10*6/mm3      Hemoglobin 12.2 g/dL      Hematocrit 35.1 %      MCV 91.6 fL      MCH 31.9 pg      MCHC 34.8 g/dL      RDW 13.6 %      RDW-SD 44.8 fl      MPV 8.5 fL      Platelets 278 10*3/mm3      Neutrophil % 80.5 %      Lymphocyte % 15.5 %      Monocyte % 3.3 %      Eosinophil % 0.0 %      Basophil % 0.2 %      Immature Grans % 0.5 %      Neutrophils, Absolute 6.57 10*3/mm3      Lymphocytes, Absolute 1.27 10*3/mm3      Monocytes, Absolute 0.27 10*3/mm3      Eosinophils, Absolute 0.00 10*3/mm3      Basophils, Absolute 0.02 10*3/mm3      Immature Grans, Absolute 0.04 10*3/mm3      nRBC 0.0 /100 WBC     Acetone [[id number]]  (Normal) Collected: 04/10/22 0024    Specimen: Blood from Arm, Left Updated: 04/10/22 0137     Acetone Negative    Phosphorus [[id number]]  (Normal) Collected: 04/10/22 0024    Specimen: Blood from  Arm, Left Updated: 04/10/22 0140     Phosphorus 2.9 mg/dL     Magnesium [[id number]]  (Normal) Collected: 04/10/22 0024    Specimen: Blood from Arm, Left Updated: 04/10/22 0140     Magnesium 1.6 mg/dL     Hemoglobin A1c [[id number]]  (Normal) Collected: 04/10/22 0024    Specimen: Blood from Arm, Left Updated: 04/10/22 0137     Hemoglobin A1C 5.60 %     Narrative:      Hemoglobin A1C Ranges:    Increased Risk for Diabetes  5.7% to 6.4%  Diabetes                     >= 6.5%  Diabetic Goal                < 7.0%    hCG, Serum, Qualitative [[id number]]  (Normal) Collected: 04/10/22 0024    Specimen: Blood from Arm, Left Updated: 04/10/22 0129     HCG Qualitative Negative    Blood Gas, Arterial With Co-Ox [[id number]]  (Abnormal) Collected: 04/10/22 0142    Specimen: Arterial Blood Updated: 04/10/22 0144     Site Left Brachial     Apolinar's Test N/A     pH, Arterial 7.375 pH units      pCO2, Arterial 31.2 mm Hg      pO2, Arterial 91.7 mm Hg      HCO3, Arterial 18.2 mmol/L      Comment: 84 Value below reference range        Base Excess, Arterial -6.0 mmol/L      O2 Saturation, Arterial 98.2 %      Hemoglobin, Blood Gas 12.4 g/dL      Comment: 84 Value below reference range        Hematocrit, Blood Gas 37.9 %      Comment: 84 Value below reference range        Oxyhemoglobin 96.8 %      Methemoglobin 0.20 %      Carboxyhemoglobin 1.2 %      A-a Gradiant 16.0 mmHg      CO2 Content 19.2 mmol/L      Temperature 0.0 C      Barometric Pressure for Blood Gas 727 mmHg      Modality Room Air     FIO2 21 %      Ventilator Mode NA     Note Read back and acknowledge     Notified Who DR CHANG // RN      Notified By SHRADDHA     Collected by [id number]     Comment: Meter: [id number]     :  [id number]        pH, Temp Corrected --     pCO2, Temperature Corrected --     pO2, Temperature Corrected --            XR Abdomen 2+ VW with Chest 1 VW   Final Result   1. Well-positioned left ureteral stent. Stone material near the left upper  ureter.   2. Bowel gas pattern is within normal limits.   3. Hepatomegaly.   4. Cholecystectomy. IVC filter.   5. Negative chest x-ray. Lungs clear.      Signer Name: Esteban Banegas MD    Signed: 4/10/2022 1:03 AM    Workstation Name: [email or web address]     [facility]            New Medications Ordered This Visit   Medications   • sodium chloride 0.9 % bolus 2,000 mL   • sulfamethoxazole-trimethoprim (BACTRIM DS,SEPTRA DS) 800-160 MG per tablet     Sig: Take 1 tablet by mouth 2 (Two) Times a Day for 14 days.     Dispense:  28 tablet     Refill:  0         Procedures            MEDICAL DECISION-MAKING AND ED COURSE:     ED Course as of 04/10/22 1613   Sun Apr 10, 2022   1609 MDM: 31 scheduled female with significant prior history of nephrolithiasis presents complaining of right flank pain.  CT imaging 3 days prior noted 7 mm stone in the right renal pelvis appears to have been present for an extended period.  Patient currently undergoing treatment for UTI for 3 days with doxycycline, reports compliance however urine from prior same-day visit still consistent with UTI, concern for failure of doxycycline.  Since prior visit patient had rising anion gap and decreasing bicarb and mildly rising glucose, raising concern for development of DKA, however acetone negative, hemoglobin A1c appropriate.  Recommend IV antibiotics for UTI and monitoring overnight for admission for fluids and monitoring of labs however patient chose not to stay.  She requested to leave AGAINST MEDICAL ADVICE.  I discussed with patient regarding risks of leaving AMA including but not limited to severely worsening illness sepsis delayed treatment and possible death.  Patient is able to understand her current understanding of her illness, appreciate the risks of leaving discharge and she is consistently rationalize her decision to do so.  Patient is aware that she may return anytime for new onset or worsening symptoms, for  further evaluation work-up or to continue treatment.  As she is living I have discharge patient with Bactrim for 14 days to treat for pyelonephritis. [KP]   1612 Glucose(!): 309 [KP]   1612 Creatinine: 0.81 [KP]   1612 CO2(!): 14.4 [KP]   1612 Anion Gap(!): 21.6 [KP]   1612 WBC: 8.17 [KP]   1612 C-Reactive Protein(!): 1.26 [KP]   1612 pH, Arterial: 7.375 [KP]      ED Course User Index  [KP] Spencer Murray MD       ?      FINAL IMPRESSION:       1. Pyelonephritis         The complaints listed here are new problems to this examiner.      FOLLOW-UP  Milan Motley MD  [address]  [place] [id number] [phone]    Schedule an appointment as soon as possible for a visit         DISPOSITION  ED Disposition     ED Disposition   AMA    Condition   --    Comment   --                   This care is provided during an unprecedented national emergency due to the Novel Coronavirus (COVID-19). COVID-19 infections and transmission risks place heavy strains on healthcare resources. As this pandemic evolves, the Hospital and providers strive to respond fluidly, to remain operational, and to provide care relative to available resources and information. Outcomes are unpredictable and treatments are without well-defined guidelines. Further, the impact of COVID-19 on all aspects of emergency care, including the impact to patients seeking care for reasons other than COVID-19, is unavoidable during this national emergency.    This note was dictated using a [id number] tool. Occasional wrong-word or 'sound-a-like' substitutions may have occurred due to the inherent limitations of voice recognition software. ?Read the chart carefully and recognize, using context, where substitutions have occurred.    Spencer Murray MD  16:13 EDT  4/10/2022             Spencer Murray MD  04/10/22 1613

## 2022-04-10 NOTE — ED NOTES
MEDICAL SCREENING:    Reason for Visit: Bilateral flank pain     Patient initially seen in triage.  The patient was advised further evaluation and diagnostic testing will be needed, some of the treatment and testing will be initiated in the lobby in order to begin the process.  The patient will be returned to the waiting area for the time being and possibly be re-assessed by a subsequent ED provider.  The patient will be brought back to the treatment area in as timely manner as possible.       Rajani Zepeda, APRN  04/09/22 4952

## 2022-04-10 NOTE — ED NOTES
"Pt states \"I don't want to stay here anymore. I'm in so much pain\". Provider at bedside talking about plan of care and risks of leaving at this time. VSS. NADN. RR even and unlabored.   "

## 2022-04-10 NOTE — DISCHARGE INSTRUCTIONS
Given your urinary tract infection and right flank pain I am concerned about kidney infection.  Doxycycline does not seem to be working.  Recommend discontinue doxycycline.  Take Bactrim for 14 days as prescribed.  Please return here for new onset or worsening symptoms specially for fever.  Otherwise please follow-up with Dr. Motley at the next available appointment.

## 2022-04-12 ENCOUNTER — HOSPITAL ENCOUNTER (EMERGENCY)
Facility: HOSPITAL | Age: 37
Discharge: LEFT BEFORE DISCHARGE | End: 2022-04-12
Attending: STUDENT IN AN ORGANIZED HEALTH CARE EDUCATION/TRAINING PROGRAM | Admitting: STUDENT IN AN ORGANIZED HEALTH CARE EDUCATION/TRAINING PROGRAM

## 2022-04-12 VITALS
SYSTOLIC BLOOD PRESSURE: 136 MMHG | DIASTOLIC BLOOD PRESSURE: 95 MMHG | HEART RATE: 100 BPM | BODY MASS INDEX: 40.97 KG/M2 | OXYGEN SATURATION: 95 % | RESPIRATION RATE: 18 BRPM | TEMPERATURE: 98.2 F | HEIGHT: 64 IN | WEIGHT: 240 LBS

## 2022-04-12 DIAGNOSIS — R10.9 FLANK PAIN: Primary | ICD-10-CM

## 2022-04-12 LAB
ALBUMIN SERPL-MCNC: 3.97 G/DL (ref 3.5–5.2)
ALBUMIN/GLOB SERPL: 1.2 G/DL
ALP SERPL-CCNC: 103 U/L (ref 39–117)
ALT SERPL W P-5'-P-CCNC: 23 U/L (ref 1–33)
ANION GAP SERPL CALCULATED.3IONS-SCNC: 13.7 MMOL/L (ref 5–15)
AST SERPL-CCNC: 18 U/L (ref 1–32)
BACTERIA UR QL AUTO: ABNORMAL /HPF
BASOPHILS # BLD MANUAL: 0.06 10*3/MM3 (ref 0–0.2)
BASOPHILS NFR BLD MANUAL: 1 % (ref 0–1.5)
BILIRUB SERPL-MCNC: 0.2 MG/DL (ref 0–1.2)
BILIRUB UR QL STRIP: NEGATIVE
BUN SERPL-MCNC: 17 MG/DL (ref 6–20)
BUN/CREAT SERPL: 26.2 (ref 7–25)
CALCIUM SPEC-SCNC: 9.5 MG/DL (ref 8.6–10.5)
CHLORIDE SERPL-SCNC: 97 MMOL/L (ref 98–107)
CLARITY UR: ABNORMAL
CO2 SERPL-SCNC: 20.3 MMOL/L (ref 22–29)
COLOR UR: YELLOW
CREAT SERPL-MCNC: 0.65 MG/DL (ref 0.57–1)
CRP SERPL-MCNC: 0.53 MG/DL (ref 0–0.5)
D-LACTATE SERPL-SCNC: 4.3 MMOL/L (ref 0.5–2)
DEPRECATED RDW RBC AUTO: 45.7 FL (ref 37–54)
EGFRCR SERPLBLD CKD-EPI 2021: 117.2 ML/MIN/1.73
EOSINOPHIL # BLD MANUAL: 0.06 10*3/MM3 (ref 0–0.4)
EOSINOPHIL NFR BLD MANUAL: 1 % (ref 0.3–6.2)
ERYTHROCYTE [DISTWIDTH] IN BLOOD BY AUTOMATED COUNT: 13.7 % (ref 12.3–15.4)
GLOBULIN UR ELPH-MCNC: 3.4 GM/DL
GLUCOSE SERPL-MCNC: 222 MG/DL (ref 65–99)
GLUCOSE UR STRIP-MCNC: ABNORMAL MG/DL
HCT VFR BLD AUTO: 33.5 % (ref 34–46.6)
HGB BLD-MCNC: 11.7 G/DL (ref 12–15.9)
HGB UR QL STRIP.AUTO: ABNORMAL
HYALINE CASTS UR QL AUTO: ABNORMAL /LPF
KETONES UR QL STRIP: NEGATIVE
LEUKOCYTE ESTERASE UR QL STRIP.AUTO: ABNORMAL
LYMPHOCYTES # BLD MANUAL: 2.13 10*3/MM3 (ref 0.7–3.1)
LYMPHOCYTES NFR BLD MANUAL: 5.5 % (ref 5–12)
MCH RBC QN AUTO: 32.6 PG (ref 26.6–33)
MCHC RBC AUTO-ENTMCNC: 34.9 G/DL (ref 31.5–35.7)
MCV RBC AUTO: 93.3 FL (ref 79–97)
MONOCYTES # BLD: 0.31 10*3/MM3 (ref 0.1–0.9)
NEUTROPHILS # BLD AUTO: 3.13 10*3/MM3 (ref 1.7–7)
NEUTROPHILS NFR BLD MANUAL: 55 % (ref 42.7–76)
NITRITE UR QL STRIP: NEGATIVE
PH UR STRIP.AUTO: <=5 [PH] (ref 5–8)
PLAT MORPH BLD: NORMAL
PLATELET # BLD AUTO: 228 10*3/MM3 (ref 140–450)
PMV BLD AUTO: 8.5 FL (ref 6–12)
POTASSIUM SERPL-SCNC: 4.1 MMOL/L (ref 3.5–5.2)
PROT SERPL-MCNC: 7.4 G/DL (ref 6–8.5)
PROT UR QL STRIP: ABNORMAL
RBC # BLD AUTO: 3.59 10*6/MM3 (ref 3.77–5.28)
RBC # UR STRIP: ABNORMAL /HPF
RBC MORPH BLD: NORMAL
REF LAB TEST METHOD: ABNORMAL
SCAN SLIDE: NORMAL
SODIUM SERPL-SCNC: 131 MMOL/L (ref 136–145)
SP GR UR STRIP: 1.01 (ref 1–1.03)
SQUAMOUS #/AREA URNS HPF: ABNORMAL /HPF
UROBILINOGEN UR QL STRIP: ABNORMAL
VARIANT LYMPHS NFR BLD MANUAL: 37.5 % (ref 19.6–45.3)
WBC # UR STRIP: ABNORMAL /HPF
WBC NRBC COR # BLD: 5.69 10*3/MM3 (ref 3.4–10.8)
YEAST URNS QL MICRO: ABNORMAL /HPF

## 2022-04-12 PROCEDURE — 87086 URINE CULTURE/COLONY COUNT: CPT | Performed by: PHYSICIAN ASSISTANT

## 2022-04-12 PROCEDURE — 81001 URINALYSIS AUTO W/SCOPE: CPT | Performed by: PHYSICIAN ASSISTANT

## 2022-04-12 PROCEDURE — 80053 COMPREHEN METABOLIC PANEL: CPT | Performed by: PHYSICIAN ASSISTANT

## 2022-04-12 PROCEDURE — 86140 C-REACTIVE PROTEIN: CPT | Performed by: PHYSICIAN ASSISTANT

## 2022-04-12 PROCEDURE — 85025 COMPLETE CBC W/AUTO DIFF WBC: CPT | Performed by: PHYSICIAN ASSISTANT

## 2022-04-12 PROCEDURE — 83605 ASSAY OF LACTIC ACID: CPT | Performed by: PHYSICIAN ASSISTANT

## 2022-04-12 PROCEDURE — 36415 COLL VENOUS BLD VENIPUNCTURE: CPT

## 2022-04-12 PROCEDURE — 99283 EMERGENCY DEPT VISIT LOW MDM: CPT

## 2022-04-12 PROCEDURE — 87040 BLOOD CULTURE FOR BACTERIA: CPT | Performed by: PHYSICIAN ASSISTANT

## 2022-04-12 PROCEDURE — 85007 BL SMEAR W/DIFF WBC COUNT: CPT | Performed by: PHYSICIAN ASSISTANT

## 2022-04-12 PROCEDURE — 96365 THER/PROPH/DIAG IV INF INIT: CPT

## 2022-04-12 RX ORDER — ACETAMINOPHEN 500 MG
1000 TABLET ORAL ONCE
Status: DISCONTINUED | OUTPATIENT
Start: 2022-04-12 | End: 2022-04-12 | Stop reason: HOSPADM

## 2022-04-12 RX ADMIN — SODIUM CHLORIDE 2 G: 9 INJECTION, SOLUTION INTRAVENOUS at 12:16

## 2022-04-12 RX ADMIN — SODIUM CHLORIDE 1641 ML: 9 INJECTION, SOLUTION INTRAVENOUS at 11:43

## 2022-04-13 LAB — BACTERIA SPEC AEROBE CULT: NORMAL

## 2022-04-16 ENCOUNTER — HOSPITAL ENCOUNTER (EMERGENCY)
Facility: HOSPITAL | Age: 37
Discharge: HOME | End: 2022-04-16
Attending: EMERGENCY MEDICINE | Admitting: EMERGENCY MEDICINE

## 2022-04-16 VITALS
DIASTOLIC BLOOD PRESSURE: 104 MMHG | HEART RATE: 101 BPM | TEMPERATURE: 97.9 F | BODY MASS INDEX: 50.02 KG/M2 | OXYGEN SATURATION: 98 % | SYSTOLIC BLOOD PRESSURE: 160 MMHG | RESPIRATION RATE: 16 BRPM | WEIGHT: 293 LBS | HEIGHT: 64 IN

## 2022-04-16 DIAGNOSIS — N39.0 ACUTE UTI: Primary | ICD-10-CM

## 2022-04-16 LAB
ALBUMIN SERPL-MCNC: 3.98 G/DL (ref 3.5–5.2)
ALBUMIN/GLOB SERPL: 1.2 G/DL
ALP SERPL-CCNC: 99 U/L (ref 39–117)
ALT SERPL W P-5'-P-CCNC: 18 U/L (ref 1–33)
ANION GAP SERPL CALCULATED.3IONS-SCNC: 14.8 MMOL/L (ref 5–15)
AST SERPL-CCNC: 16 U/L (ref 1–32)
BACTERIA UR QL AUTO: ABNORMAL /HPF
BASOPHILS # BLD AUTO: 0.03 10*3/MM3 (ref 0–0.2)
BASOPHILS NFR BLD AUTO: 0.6 % (ref 0–1.5)
BILIRUB SERPL-MCNC: 0.3 MG/DL (ref 0–1.2)
BILIRUB UR QL STRIP: NEGATIVE
BUN SERPL-MCNC: 19 MG/DL (ref 6–20)
BUN/CREAT SERPL: 28.8 (ref 7–25)
CALCIUM SPEC-SCNC: 9.4 MG/DL (ref 8.6–10.5)
CHLORIDE SERPL-SCNC: 96 MMOL/L (ref 98–107)
CLARITY UR: ABNORMAL
CO2 SERPL-SCNC: 20.2 MMOL/L (ref 22–29)
COLOR UR: YELLOW
CREAT SERPL-MCNC: 0.66 MG/DL (ref 0.57–1)
DEPRECATED RDW RBC AUTO: 46.7 FL (ref 37–54)
EGFRCR SERPLBLD CKD-EPI 2021: 116.8 ML/MIN/1.73
EOSINOPHIL # BLD AUTO: 0.16 10*3/MM3 (ref 0–0.4)
EOSINOPHIL NFR BLD AUTO: 3.3 % (ref 0.3–6.2)
ERYTHROCYTE [DISTWIDTH] IN BLOOD BY AUTOMATED COUNT: 13.8 % (ref 12.3–15.4)
GLOBULIN UR ELPH-MCNC: 3.2 GM/DL
GLUCOSE SERPL-MCNC: 219 MG/DL (ref 65–99)
GLUCOSE UR STRIP-MCNC: ABNORMAL MG/DL
HCT VFR BLD AUTO: 34.2 % (ref 34–46.6)
HGB BLD-MCNC: 11.7 G/DL (ref 12–15.9)
HGB UR QL STRIP.AUTO: ABNORMAL
HYALINE CASTS UR QL AUTO: ABNORMAL /LPF
IMM GRANULOCYTES # BLD AUTO: 0.02 10*3/MM3 (ref 0–0.05)
IMM GRANULOCYTES NFR BLD AUTO: 0.4 % (ref 0–0.5)
KETONES UR QL STRIP: NEGATIVE
LEUKOCYTE ESTERASE UR QL STRIP.AUTO: ABNORMAL
LYMPHOCYTES # BLD AUTO: 1.74 10*3/MM3 (ref 0.7–3.1)
LYMPHOCYTES NFR BLD AUTO: 35.9 % (ref 19.6–45.3)
MCH RBC QN AUTO: 32.1 PG (ref 26.6–33)
MCHC RBC AUTO-ENTMCNC: 34.2 G/DL (ref 31.5–35.7)
MCV RBC AUTO: 94 FL (ref 79–97)
MONOCYTES # BLD AUTO: 0.47 10*3/MM3 (ref 0.1–0.9)
MONOCYTES NFR BLD AUTO: 9.7 % (ref 5–12)
NEUTROPHILS NFR BLD AUTO: 2.43 10*3/MM3 (ref 1.7–7)
NEUTROPHILS NFR BLD AUTO: 50.1 % (ref 42.7–76)
NITRITE UR QL STRIP: NEGATIVE
NRBC BLD AUTO-RTO: 0 /100 WBC (ref 0–0.2)
PH UR STRIP.AUTO: <=5 [PH] (ref 5–8)
PLATELET # BLD AUTO: 224 10*3/MM3 (ref 140–450)
PMV BLD AUTO: 8.5 FL (ref 6–12)
POTASSIUM SERPL-SCNC: 4.2 MMOL/L (ref 3.5–5.2)
PROT SERPL-MCNC: 7.2 G/DL (ref 6–8.5)
PROT UR QL STRIP: ABNORMAL
RBC # BLD AUTO: 3.64 10*6/MM3 (ref 3.77–5.28)
RBC # UR STRIP: ABNORMAL /HPF
REF LAB TEST METHOD: ABNORMAL
SODIUM SERPL-SCNC: 131 MMOL/L (ref 136–145)
SP GR UR STRIP: 1.02 (ref 1–1.03)
SQUAMOUS #/AREA URNS HPF: ABNORMAL /HPF
UROBILINOGEN UR QL STRIP: ABNORMAL
WBC # UR STRIP: ABNORMAL /HPF
WBC NRBC COR # BLD: 4.85 10*3/MM3 (ref 3.4–10.8)
YEAST URNS QL MICRO: ABNORMAL /HPF

## 2022-04-16 PROCEDURE — 96374 THER/PROPH/DIAG INJ IV PUSH: CPT

## 2022-04-16 PROCEDURE — 80053 COMPREHEN METABOLIC PANEL: CPT | Performed by: NURSE PRACTITIONER

## 2022-04-16 PROCEDURE — 87086 URINE CULTURE/COLONY COUNT: CPT | Performed by: NURSE PRACTITIONER

## 2022-04-16 PROCEDURE — 81001 URINALYSIS AUTO W/SCOPE: CPT | Performed by: NURSE PRACTITIONER

## 2022-04-16 PROCEDURE — 96375 TX/PRO/DX INJ NEW DRUG ADDON: CPT

## 2022-04-16 PROCEDURE — 25010000002 MORPHINE PER 10 MG: Performed by: NURSE PRACTITIONER

## 2022-04-16 PROCEDURE — 99283 EMERGENCY DEPT VISIT LOW MDM: CPT

## 2022-04-16 PROCEDURE — 25010000002 ONDANSETRON PER 1 MG: Performed by: NURSE PRACTITIONER

## 2022-04-16 PROCEDURE — 25010000002 HYDROMORPHONE PER 4 MG: Performed by: NURSE PRACTITIONER

## 2022-04-16 PROCEDURE — 85025 COMPLETE CBC W/AUTO DIFF WBC: CPT | Performed by: NURSE PRACTITIONER

## 2022-04-16 RX ORDER — CEFDINIR 300 MG/1
300 CAPSULE ORAL 2 TIMES DAILY
Qty: 20 CAPSULE | Refills: 0 | Status: SHIPPED | OUTPATIENT
Start: 2022-04-16 | End: 2022-04-25

## 2022-04-16 RX ORDER — CEFDINIR 300 MG/1
300 CAPSULE ORAL ONCE
Status: COMPLETED | OUTPATIENT
Start: 2022-04-16 | End: 2022-04-16

## 2022-04-16 RX ORDER — SODIUM CHLORIDE 0.9 % (FLUSH) 0.9 %
10 SYRINGE (ML) INJECTION AS NEEDED
Status: DISCONTINUED | OUTPATIENT
Start: 2022-04-16 | End: 2022-04-16 | Stop reason: HOSPADM

## 2022-04-16 RX ORDER — ONDANSETRON 2 MG/ML
4 INJECTION INTRAMUSCULAR; INTRAVENOUS ONCE
Status: COMPLETED | OUTPATIENT
Start: 2022-04-16 | End: 2022-04-16

## 2022-04-16 RX ORDER — HYDROMORPHONE HYDROCHLORIDE 1 MG/ML
0.5 INJECTION, SOLUTION INTRAMUSCULAR; INTRAVENOUS; SUBCUTANEOUS ONCE
Status: COMPLETED | OUTPATIENT
Start: 2022-04-16 | End: 2022-04-16

## 2022-04-16 RX ADMIN — HYDROMORPHONE HYDROCHLORIDE 0.5 MG: 1 INJECTION, SOLUTION INTRAMUSCULAR; INTRAVENOUS; SUBCUTANEOUS at 13:29

## 2022-04-16 RX ADMIN — CEFDINIR 300 MG: 300 CAPSULE ORAL at 13:29

## 2022-04-16 RX ADMIN — SODIUM CHLORIDE 1000 ML: 9 INJECTION, SOLUTION INTRAVENOUS at 11:25

## 2022-04-16 RX ADMIN — MORPHINE SULFATE 4 MG: 4 INJECTION, SOLUTION INTRAMUSCULAR; INTRAVENOUS at 11:27

## 2022-04-16 RX ADMIN — ONDANSETRON 4 MG: 2 INJECTION INTRAMUSCULAR; INTRAVENOUS at 11:25

## 2022-04-16 NOTE — ED PROVIDER NOTES
Subjective     Flank Pain  Pain location:  L flank  Pain quality: sharp    Pain radiates to:  Does not radiate  Pain severity:  Moderate  Onset quality:  Sudden  Duration:  2 days  Timing:  Constant  Progression:  Worsening  Chronicity:  New  Context: not alcohol use, not eating, not recent illness, not recent sexual activity, not sick contacts and not suspicious food intake    Relieved by:  Nothing  Worsened by:  Nothing  Ineffective treatments:  None tried  Associated symptoms: nausea    Associated symptoms: no anorexia, no belching, no constipation, no cough, no flatus, no hematochezia, no shortness of breath, no vaginal bleeding and no vaginal discharge    Risk factors: no alcohol abuse, has not had multiple surgeries and no recent hospitalization        Review of Systems   Constitutional: Negative.    HENT: Negative.    Eyes: Negative.    Respiratory: Negative.  Negative for cough and shortness of breath.    Cardiovascular: Negative.    Gastrointestinal: Positive for nausea. Negative for anorexia, constipation, flatus and hematochezia.   Endocrine: Negative.    Genitourinary: Positive for flank pain. Negative for vaginal bleeding and vaginal discharge.   Skin: Negative.    Allergic/Immunologic: Negative.    Neurological: Negative.    Hematological: Negative.    Psychiatric/Behavioral: Negative.        Past Medical History:   Diagnosis Date   • A-fib (HCC)    • Abnormal ECG    • Anemia    • Anxiety    • Asthma    • Cancer (HCC)     Ovarian   • Depression    • Diabetes mellitus (HCC)    • DVT (deep venous thrombosis) (HCC)    • Factor 5 Leiden mutation, heterozygous (HCC)    • Fibroid    • GERD (gastroesophageal reflux disease)    • Gout    • H/O abdominal abscess    • Hyperlipidemia    • Hypothyroid    • Kidney stone    • Migraines    • Neuropathy    • Ovarian cancer (HCC) 02/2021   • Ovarian cyst    • PE (pulmonary embolism)    • Polycystic ovary syndrome    • Preeclampsia    • Rh incompatibility    • Stroke  ([facility])    • TIA (transient ischemic attack)    • Urinary tract infection    • Varicella        Allergies   Allergen Reactions   • Toradol [Ketorolac Tromethamine] Anaphylaxis and Hives   • Haldol [Haloperidol] Hives and Mental Status Change   • Tramadol Hives and Swelling   • Amoxicillin Hives and Rash   • Penicillins Hives and Rash       Past Surgical History:   Procedure Laterality Date   • ABDOMINAL SURGERY     • CARDIAC CATHETERIZATION     •  SECTION     • CHOLECYSTECTOMY     • COLONOSCOPY     • ENDOSCOPY     • EXTRACORPOREAL SHOCK WAVE LITHOTRIPSY (ESWL) Left 10/22/2021    Procedure: EXTRACORPOREAL SHOCKWAVE LITHOTRIPSY;  Surgeon: Milan Motley MD;  Location: [facility];  Service: Urology;  Laterality: Left;   • LITHOTRIPSY     • RIGHT OOPHORECTOMY     • URETEROSCOPY LASER LITHOTRIPSY WITH STENT INSERTION Left 10/01/2021    Procedure: URETEROSCOPY WITH STENT PLACEMENT;  Surgeon: Milan Motley MD;  Location: [facility];  Service: Urology;  Laterality: Left;       Family History   Problem Relation Age of Onset   • Hypertension Mother    • Diabetes Father    • Hypertension Father    • Heart attack Father    • No Known Problems Sister    • No Known Problems Brother    • No Known Problems Son    • No Known Problems Daughter    • No Known Problems Maternal Grandmother    • No Known Problems Maternal Grandfather    • No Known Problems Paternal Grandmother    • No Known Problems Paternal Grandfather    • No Known Problems Cousin    • Rheum arthritis Neg Hx    • Osteoarthritis Neg Hx    • Asthma Neg Hx    • Heart failure Neg Hx    • Hyperlipidemia Neg Hx    • Migraines Neg Hx    • Rashes / Skin problems Neg Hx    • Seizures Neg Hx    • Stroke Neg Hx    • Thyroid disease Neg Hx        Social History     Socioeconomic History   • Marital status:    Tobacco Use   • Smoking status: Never Smoker   • Smokeless tobacco: Never Used   Vaping Use   • Vaping Use: Never used   Substance and Sexual  "Activity   • Alcohol use: No   • Drug use: Never     Comment: Pt has track marks on right arm. Pt states \"It's from my INR being drawn.\"    • Sexual activity: Not Currently     Partners: Male     Birth control/protection: None           Objective   Physical Exam  Vitals and nursing note reviewed.   Constitutional:       Appearance: She is well-developed.   HENT:      Head: Normocephalic.      Right Ear: External ear normal.      Left Ear: External ear normal.   Eyes:      Conjunctiva/sclera: Conjunctivae normal.      Pupils: Pupils are equal, round, and reactive to light.   Cardiovascular:      Rate and Rhythm: Normal rate and regular rhythm.      Heart sounds: Normal heart sounds.   Pulmonary:      Effort: Pulmonary effort is normal.      Breath sounds: Normal breath sounds.   Abdominal:      General: Bowel sounds are normal.      Palpations: Abdomen is soft.   Musculoskeletal:         General: Normal range of motion.      Cervical back: Normal range of motion and neck supple.   Skin:     General: Skin is warm and dry.      Capillary Refill: Capillary refill takes less than 2 seconds.   Neurological:      Mental Status: She is alert and oriented to person, place, and time.   Psychiatric:         Behavior: Behavior normal.         Thought Content: Thought content normal.         Procedures           ED Course                                                 MDM    Final diagnoses:   Acute UTI       ED Disposition  ED Disposition     ED Disposition   Discharge    Condition   Stable    Comment   --             Eddie Latif, APRN  [address] [id number] [phone]    Schedule an appointment as soon as possible for a visit   For further evaluation         Medication List      New Prescriptions    cefdinir 300 MG capsule  Commonly known as: OMNICEF  Take 1 capsule by mouth 2 (Two) Times a Day for 10 days.           Where to Get Your Medications      You can get these medications from any pharmacy  "   Bring a paper prescription for each of these medications  · cefdinir 300 MG capsule          Charly Fisher, APRN  04/17/22 8521

## 2022-04-17 LAB
BACTERIA SPEC AEROBE CULT: NORMAL

## 2022-04-18 DIAGNOSIS — Z79.4 TYPE 2 DIABETES MELLITUS WITH DIABETIC NEUROPATHY, WITH LONG-TERM CURRENT USE OF INSULIN: ICD-10-CM

## 2022-04-18 DIAGNOSIS — E11.40 TYPE 2 DIABETES MELLITUS WITH DIABETIC NEUROPATHY, WITH LONG-TERM CURRENT USE OF INSULIN: ICD-10-CM

## 2022-04-19 ENCOUNTER — OFFICE VISIT (OUTPATIENT)
Dept: UROLOGY | Facility: CLINIC | Age: 37
End: 2022-04-19

## 2022-04-19 VITALS — HEIGHT: 64 IN | WEIGHT: 293 LBS | BODY MASS INDEX: 50.02 KG/M2

## 2022-04-19 DIAGNOSIS — N20.0 KIDNEY STONE: Primary | ICD-10-CM

## 2022-04-19 DIAGNOSIS — N20.0 RENAL CALCULUS, LEFT: ICD-10-CM

## 2022-04-19 PROCEDURE — 99213 OFFICE O/P EST LOW 20 MIN: CPT | Performed by: UROLOGY

## 2022-04-19 RX ORDER — GENTAMICIN SULFATE 80 MG/100ML
80 INJECTION, SOLUTION INTRAVENOUS ONCE
Status: CANCELLED | OUTPATIENT
Start: 2022-04-27 | End: 2022-04-19

## 2022-04-19 RX ORDER — OXYCODONE AND ACETAMINOPHEN 10; 325 MG/1; MG/1
1 TABLET ORAL EVERY 6 HOURS PRN
Qty: 20 TABLET | Refills: 0 | Status: SHIPPED | OUTPATIENT
Start: 2022-04-19 | End: 2022-06-09

## 2022-04-19 NOTE — PROGRESS NOTES
Chief Complaint:          Chief Complaint   Patient presents with   • Nephrolithiasis       HPI:   37 y.o. female   She has a left stent position after successful lithotripsy and developed a GI abscess she apparently was transferred to [facility] for prolonged stay.  She has been on doxycycline she was then transferred to [place] she has been on Eliquis she has an IVC filter.  She returns today.  The problem getting her on the schedule she goes to the ER so much and started to follow-up and has so many CAT scans to document the left-sided stent and the left-sided stone we know we need to get it out I told her that I gave her enough pain medication to last her and I will set her up for ureteroscopy on Wednesday, April 27 is extremely important that she try to stay out of the emergency room .  It appears since her last visit she has had 8 CAT scans      Past Medical History:        Past Medical History:   Diagnosis Date   • A-fib (HCC)    • Abnormal ECG    • Anemia    • Anxiety    • Asthma    • Cancer (HCC)     Ovarian   • Depression    • Diabetes mellitus (HCC)    • DVT (deep venous thrombosis) ([facility])    • Factor 5 Leiden mutation, heterozygous (HCC)    • Fibroid    • GERD (gastroesophageal reflux disease)    • Gout    • H/O abdominal abscess    • Hyperlipidemia    • Hypothyroid    • Kidney stone    • Migraines    • Neuropathy    • Ovarian cancer (HCC) 02/2021   • Ovarian cyst    • PE (pulmonary embolism)    • Polycystic ovary syndrome    • Preeclampsia    • Rh incompatibility    • Stroke (HCC)    • TIA (transient ischemic attack)    • Urinary tract infection    • Varicella          Current Meds:     Current Outpatient Medications   Medication Sig Dispense Refill   • amLODIPine (NORVASC) 10 MG tablet Take 10 mg by mouth Daily.     • apixaban (ELIQUIS) 5 MG tablet tablet Take 5 mg by mouth 2 (Two) Times a Day.     • cefdinir (OMNICEF) 300 MG capsule Take 1 capsule by mouth 2 (Two) Times a Day for 10 days. 20  capsule 0   • cyanocobalamin ([employer] Vitamin B-12) 2000 MCG tablet Take 1 tablet by mouth Daily. 30 tablet 3   • DULoxetine (CYMBALTA) 20 MG capsule Take 1 capsule by mouth Daily. 30 capsule 0   • gabapentin (NEURONTIN) 300 MG capsule Take 1 capsule by mouth 2 (Two) Times a Day for 30 days. 60 capsule 0   • HYDROcodone-acetaminophen (NORCO) 5-325 MG per tablet Take 1 tablet by mouth Every 6 (Six) Hours As Needed for Severe Pain . 10 tablet 0   • insulin glargine (LANTUS, SEMGLEE) 100 UNIT/ML injection Inject 25 Units under the skin into the appropriate area as directed Daily.     • metoprolol succinate XL (TOPROL-XL) 25 MG 24 hr tablet Take 25 mg by mouth Every Night.     • naloxone (NARCAN) 4 MG/0.1ML nasal spray 1 spray into the nostril(s) as directed by provider As Needed.     • ondansetron ODT (ZOFRAN-ODT) 4 MG disintegrating tablet Place 4 mg on the tongue Every 8 (Eight) Hours As Needed for Nausea or Vomiting.     • promethazine (PHENERGAN) 25 MG tablet Take 1 tablet by mouth Every 6 (Six) Hours As Needed for Nausea or Vomiting. 21 tablet 2   • Semaglutide,0.25 or 0.5MG/DOS, (Ozempic, 0.25 or 0.5 MG/DOSE,) 2 MG/1.5ML solution pen-injector Inject 0.25 mg under the skin into the appropriate area as directed 1 (One) Time Per Week. Prior to [facility] Admission, Patient was on:  takes on sundays     • sulfamethoxazole-trimethoprim (BACTRIM DS,SEPTRA DS) 800-160 MG per tablet Take 1 tablet by mouth 2 (Two) Times a Day for 14 days. 28 tablet 0   • vitamin D (ERGOCALCIFEROL) 1.25 MG (59326 UT) capsule capsule Take 50,000 Units by mouth 1 (One) Time Per Week. Prior to [facility] Admission, Patient was on:  takes on saturday       No current facility-administered medications for this visit.        Allergies:      Allergies   Allergen Reactions   • Toradol [Ketorolac Tromethamine] Anaphylaxis and Hives   • Haldol [Haloperidol] Hives and Mental Status Change   • Tramadol Hives and Swelling   • Amoxicillin Hives and Rash   •  "Penicillins Hives and Rash        Past Surgical History:     Past Surgical History:   Procedure Laterality Date   • ABDOMINAL SURGERY     • CARDIAC CATHETERIZATION     •  SECTION     • CHOLECYSTECTOMY     • COLONOSCOPY     • ENDOSCOPY     • EXTRACORPOREAL SHOCK WAVE LITHOTRIPSY (ESWL) Left 10/22/2021    Procedure: EXTRACORPOREAL SHOCKWAVE LITHOTRIPSY;  Surgeon: Milan Motley MD;  Location: [facility];  Service: Urology;  Laterality: Left;   • LITHOTRIPSY     • RIGHT OOPHORECTOMY     • URETEROSCOPY LASER LITHOTRIPSY WITH STENT INSERTION Left 10/01/2021    Procedure: URETEROSCOPY WITH STENT PLACEMENT;  Surgeon: Milan Motley MD;  Location: [facility];  Service: Urology;  Laterality: Left;         Social History:     Social History     Socioeconomic History   • Marital status:    Tobacco Use   • Smoking status: Never Smoker   • Smokeless tobacco: Never Used   Vaping Use   • Vaping Use: Never used   Substance and Sexual Activity   • Alcohol use: No   • Drug use: Never     Comment: Pt has track marks on right arm. Pt states \"It's from my INR being drawn.\"    • Sexual activity: Not Currently     Partners: Male     Birth control/protection: None       Family History:     Family History   Problem Relation Age of Onset   • Hypertension Mother    • Diabetes Father    • Hypertension Father    • Heart attack Father    • No Known Problems Sister    • No Known Problems Brother    • No Known Problems Son    • No Known Problems Daughter    • No Known Problems Maternal Grandmother    • No Known Problems Maternal Grandfather    • No Known Problems Paternal Grandmother    • No Known Problems Paternal Grandfather    • No Known Problems Cousin    • Rheum arthritis Neg Hx    • Osteoarthritis Neg Hx    • Asthma Neg Hx    • Heart failure Neg Hx    • Hyperlipidemia Neg Hx    • Migraines Neg Hx    • Rashes / Skin problems Neg Hx    • Seizures Neg Hx    • Stroke Neg Hx    • Thyroid disease Neg Hx  "       Review of Systems:     Review of Systems   Constitutional: Negative.  Negative for activity change, appetite change, chills, diaphoresis, fatigue and unexpected weight change.   HENT: Negative for congestion, dental problem, drooling, ear discharge, ear pain, facial swelling, hearing loss, mouth sores, nosebleeds, postnasal drip, rhinorrhea, sinus pressure, sneezing, sore throat, tinnitus, trouble swallowing and voice change.    Eyes: Negative.  Negative for photophobia, pain, discharge, redness, itching and visual disturbance.   Respiratory: Negative.  Negative for apnea, cough, choking, chest tightness, shortness of breath, wheezing and stridor.    Cardiovascular: Negative.  Negative for chest pain, palpitations and leg swelling.   Gastrointestinal: Negative.  Negative for abdominal distention, abdominal pain, anal bleeding, blood in stool, constipation, diarrhea, nausea, rectal pain and vomiting.   Endocrine: Negative.  Negative for cold intolerance, heat intolerance, polydipsia, polyphagia and polyuria.   Musculoskeletal: Negative.  Negative for arthralgias, back pain, gait problem, joint swelling, myalgias, neck pain and neck stiffness.   Skin: Negative.  Negative for color change, pallor, rash and wound.   Allergic/Immunologic: Negative.  Negative for environmental allergies, food allergies and immunocompromised state.   Neurological: Negative.  Negative for dizziness, tremors, seizures, syncope, facial asymmetry, speech difficulty, weakness, light-headedness, numbness and headaches.   Hematological: Negative.  Negative for adenopathy. Does not bruise/bleed easily.   Psychiatric/Behavioral: Negative for agitation, behavioral problems, confusion, decreased concentration, dysphoric mood, hallucinations, self-injury, sleep disturbance and suicidal ideas. The patient is not nervous/anxious and is not hyperactive.    All other systems reviewed and are negative.      Physical Exam:     Physical  Exam  Constitutional:       Appearance: She is well-developed.   HENT:      Head: Normocephalic and atraumatic.      Right Ear: External ear normal.      Left Ear: External ear normal.   Eyes:      Conjunctiva/sclera: Conjunctivae normal.      Pupils: Pupils are equal, round, and reactive to light.   Cardiovascular:      Rate and Rhythm: Normal rate and regular rhythm.      Heart sounds: Normal heart sounds.   Pulmonary:      Effort: Pulmonary effort is normal.      Breath sounds: Normal breath sounds.   Abdominal:      General: Bowel sounds are normal. There is no distension.      Palpations: Abdomen is soft. There is no mass.      Tenderness: There is no abdominal tenderness. There is no guarding or rebound.   Genitourinary:     Vagina: No vaginal discharge.   Musculoskeletal:         General: Normal range of motion.   Skin:     General: Skin is warm and dry.   Neurological:      Mental Status: She is alert.      Deep Tendon Reflexes: Reflexes are normal and symmetric.   Psychiatric:         Behavior: Behavior normal.         Thought Content: Thought content normal.         Judgment: Judgment normal.         I have reviewed the following portions of the patient's history: Allergies, current medications, past family history, past medical history, past social history, past surgical history, problem list, and ROS and confirm it is accurate.      Procedure:       Assessment/Plan:   Left ureteral stent in position with stone been trying to remove it but she goes to the emergency room on a constant basis.  She set up for Wednesday the 27th                  This document has been electronically signed by BRITTANY ELIZONDO MD April 19, 2022 08:00 EDT

## 2022-04-20 DIAGNOSIS — E11.40 TYPE 2 DIABETES MELLITUS WITH DIABETIC NEUROPATHY, WITH LONG-TERM CURRENT USE OF INSULIN: Primary | ICD-10-CM

## 2022-04-20 DIAGNOSIS — I10 PRIMARY HYPERTENSION: ICD-10-CM

## 2022-04-20 DIAGNOSIS — Z79.4 TYPE 2 DIABETES MELLITUS WITH DIABETIC NEUROPATHY, WITH LONG-TERM CURRENT USE OF INSULIN: Primary | ICD-10-CM

## 2022-04-20 RX ORDER — GABAPENTIN 300 MG/1
300 CAPSULE ORAL 2 TIMES DAILY
Qty: 60 CAPSULE | Refills: 0 | Status: SHIPPED | OUTPATIENT
Start: 2022-04-20 | End: 2022-05-23 | Stop reason: SDUPTHER

## 2022-04-20 RX ORDER — LANCETS 33 GAUGE
1 EACH MISCELLANEOUS 3 TIMES DAILY
Qty: 100 EACH | Refills: 2 | Status: SHIPPED | OUTPATIENT
Start: 2022-04-20 | End: 2022-07-21 | Stop reason: SDUPTHER

## 2022-04-20 RX ORDER — METOPROLOL SUCCINATE 25 MG/1
25 TABLET, EXTENDED RELEASE ORAL NIGHTLY
Qty: 30 TABLET | Refills: 0 | Status: SHIPPED | OUTPATIENT
Start: 2022-04-20 | End: 2022-05-23 | Stop reason: SDUPTHER

## 2022-04-20 RX ORDER — DULOXETIN HYDROCHLORIDE 20 MG/1
20 CAPSULE, DELAYED RELEASE ORAL DAILY
Qty: 30 CAPSULE | Refills: 0 | Status: SHIPPED | OUTPATIENT
Start: 2022-04-20 | End: 2022-04-28 | Stop reason: SDUPTHER

## 2022-04-20 NOTE — ED PROVIDER NOTES
Subjective   Pt currently taking bactrim for UTI. Pt has hx of chronic kidney stones and hematuria. Hx of chronic flank pain. Pt asking for dilaudid for pain repetitively.       History provided by:  Patient   used: No    Flank Pain  Pain location:  L flank  Pain quality: sharp    Pain radiates to:  Does not radiate  Pain severity:  Mild  Onset quality:  Sudden  Duration:  2 days  Timing:  Constant  Progression:  Waxing and waning  Chronicity:  Chronic  Relieved by:  Nothing  Associated symptoms: no chest pain, no chills, no cough, no diarrhea, no dysuria, no fever, no nausea, no shortness of breath, no sore throat and no vomiting        Review of Systems   Constitutional: Negative for chills and fever.   HENT: Negative for congestion, ear pain and sore throat.    Respiratory: Negative for cough, shortness of breath and wheezing.    Cardiovascular: Negative for chest pain.   Gastrointestinal: Negative for diarrhea, nausea and vomiting.   Genitourinary: Positive for flank pain. Negative for dysuria.   Skin: Negative for rash.   Neurological: Negative for headaches.   Psychiatric/Behavioral: The patient is not nervous/anxious.    All other systems reviewed and are negative.      Past Medical History:   Diagnosis Date   • A-fib ([facility])    • Abnormal ECG    • Anemia    • Anxiety    • Asthma    • Cancer ([facility])     Ovarian   • Depression    • Diabetes mellitus ([facility])    • DVT (deep venous thrombosis) ([facility])    • Factor 5 Leiden mutation, heterozygous ([facility])    • Fibroid    • GERD (gastroesophageal reflux disease)    • Gout    • H/O abdominal abscess    • Hyperlipidemia    • Hypothyroid    • Kidney stone    • Migraines    • Neuropathy    • Ovarian cancer ([facility]) 02/2021   • Ovarian cyst    • PE (pulmonary embolism)    • Polycystic ovary syndrome    • Preeclampsia    • Rh incompatibility    • Stroke ([facility])    • TIA (transient ischemic attack)    • Urinary tract infection    • Varicella        Allergies   Allergen  "Reactions   • Toradol [Ketorolac Tromethamine] Anaphylaxis and Hives   • Haldol [Haloperidol] Hives and Mental Status Change   • Tramadol Hives and Swelling   • Amoxicillin Hives and Rash   • Penicillins Hives and Rash       Past Surgical History:   Procedure Laterality Date   • ABDOMINAL SURGERY     • CARDIAC CATHETERIZATION     •  SECTION     • CHOLECYSTECTOMY     • COLONOSCOPY     • ENDOSCOPY     • EXTRACORPOREAL SHOCK WAVE LITHOTRIPSY (ESWL) Left 10/22/2021    Procedure: EXTRACORPOREAL SHOCKWAVE LITHOTRIPSY;  Surgeon: Milan Motley MD;  Location: [facility];  Service: Urology;  Laterality: Left;   • LITHOTRIPSY     • RIGHT OOPHORECTOMY     • URETEROSCOPY LASER LITHOTRIPSY WITH STENT INSERTION Left 10/01/2021    Procedure: URETEROSCOPY WITH STENT PLACEMENT;  Surgeon: Milan Motley MD;  Location: [facility];  Service: Urology;  Laterality: Left;       Family History   Problem Relation Age of Onset   • Hypertension Mother    • Diabetes Father    • Hypertension Father    • Heart attack Father    • No Known Problems Sister    • No Known Problems Brother    • No Known Problems Son    • No Known Problems Daughter    • No Known Problems Maternal Grandmother    • No Known Problems Maternal Grandfather    • No Known Problems Paternal Grandmother    • No Known Problems Paternal Grandfather    • No Known Problems Cousin    • Rheum arthritis Neg Hx    • Osteoarthritis Neg Hx    • Asthma Neg Hx    • Heart failure Neg Hx    • Hyperlipidemia Neg Hx    • Migraines Neg Hx    • Rashes / Skin problems Neg Hx    • Seizures Neg Hx    • Stroke Neg Hx    • Thyroid disease Neg Hx        Social History     Socioeconomic History   • Marital status:    Tobacco Use   • Smoking status: Never Smoker   • Smokeless tobacco: Never Used   Vaping Use   • Vaping Use: Never used   Substance and Sexual Activity   • Alcohol use: No   • Drug use: Never     Comment: Pt has track marks on right arm. Pt states \"It's from " "my INR being drawn.\"    • Sexual activity: Not Currently     Partners: Male     Birth control/protection: None           Objective   Physical Exam  Vitals and nursing note reviewed.   Constitutional:       Appearance: She is well-developed.   HENT:      Head: Normocephalic.   Cardiovascular:      Rate and Rhythm: Normal rate and regular rhythm.   Pulmonary:      Effort: Pulmonary effort is normal.      Breath sounds: Normal breath sounds.   Abdominal:      General: Bowel sounds are normal.      Palpations: Abdomen is soft.      Tenderness: There is abdominal tenderness.   Musculoskeletal:         General: Normal range of motion.      Cervical back: Neck supple.   Skin:     General: Skin is warm and dry.   Neurological:      Mental Status: She is alert and oriented to person, place, and time.   Psychiatric:         Behavior: Behavior normal.         Thought Content: Thought content normal.         Judgment: Judgment normal.         Procedures           ED Course  ED Course as of 04/20/22 1009   Tue Apr 12, 2022   1237 Discussed with Dr Mccoy will order a renal ultrasound due to pt having increased ct abd/pelvis in the last 2 weeks. Pt is wanting Pain meds was offered tylenol and refused. Wanting Dilaudid. Pt is signing out AMA. Benefits and risks were discussed with pt.  [LC]      ED Course User Index  [LC] Hedy Kate PA                                                 [facility]    Final diagnoses:   Flank pain       ED Disposition  ED Disposition     ED Disposition   AMA    Condition   --    Comment   --             No follow-up provider specified.       Medication List      No changes were made to your prescriptions during this visit.          Hedy Kate PA  04/20/22 1010    "

## 2022-04-25 ENCOUNTER — LAB (OUTPATIENT)
Dept: LAB | Facility: HOSPITAL | Age: 37
End: 2022-04-25

## 2022-04-25 ENCOUNTER — PRE-ADMISSION TESTING (OUTPATIENT)
Dept: PREADMISSION TESTING | Facility: HOSPITAL | Age: 37
End: 2022-04-25

## 2022-04-25 DIAGNOSIS — N20.0 KIDNEY STONE: ICD-10-CM

## 2022-04-25 LAB — SARS-COV-2 RNA RESP QL NAA+PROBE: NOT DETECTED

## 2022-04-25 PROCEDURE — U0003 INFECTIOUS AGENT DETECTION BY NUCLEIC ACID (DNA OR RNA); SEVERE ACUTE RESPIRATORY SYNDROME CORONAVIRUS 2 (SARS-COV-2) (CORONAVIRUS DISEASE [COVID-19]), AMPLIFIED PROBE TECHNIQUE, MAKING USE OF HIGH THROUGHPUT TECHNOLOGIES AS DESCRIBED BY CMS-2020-01-R: HCPCS | Performed by: UROLOGY

## 2022-04-25 PROCEDURE — C9803 HOPD COVID-19 SPEC COLLECT: HCPCS | Performed by: UROLOGY

## 2022-04-25 NOTE — PAT
Dr. Motley's office called about eliquis and ask office personal to call pt about stopping or continuing it.

## 2022-04-25 NOTE — DISCHARGE INSTRUCTIONS
TAKE the following medications the morning of surgery:    All heart or blood pressure medications    Please discontinue all blood thinners and anticoagulants (except aspirin) prior to surgery as per your surgeon and cardiologist instructions.  Aspirin may be continued up to the day prior to surgery.    HOLD all diabetic medications the morning of surgery as order by physician.    Please follow instructions on use of prep cloths provided by nurse. Return instruction sheet to pre-op nurse on day of surgery.    Arrival time for surgery on   will be given to you by Dr. Adams.    A RESPONSIBLE PERSON MUST REMAIN IN THE WAITING ROOM DURING YOUR PROCEDURE AND A RESPONSIBLE  MUST BE AVAILABLE UPON YOUR DISCHARGE.    General Instructions:  Do NOT eat or drink after midnight which includes water, mints, or gum.  You may brush your teeth. Dental appliances that are removable must be taken out day of surgery.  Do NOT smoke, chew tobacco, or drink alcohol within 24 hours prior to surgery.  Bring medications in original bottles, any inhalers and if applicable your C-PAP/BI-PAP machine  Bring any papers given to you in the doctor’s office  Wear clean, comfortable clothes and socks  Do NOT wear contact lenses or make-up or dark nail polish.  Bring a case for your glasses if applicable.  Bring crutches or walker if applicable  Leave all other valuables and jewelry at home  If you were given a blood bank armband, continue to wear it until discharged.    Preventing a Surgical Site Infection:  Shower the night before surgery (unless instructed otherwise) using a fresh bar of anti-bacterial soap (such as Dial) and clean washcloth.  Dry with a clean towel and dress in clean clothing.  For 2 to 3 days before surgery, avoid shaving with a razor near where you will have surgery because the razor can irritate skin and make it easier to develop an infection.  Ask your surgeon if you will be receiving antibiotics prior to  surgery.  Make sure you, your family, and all healthcare providers clean their hands with soap and water or an alcohol-based hand  before caring for you or your wound.  If at all possible, quit smoking as many days before surgery as you can.    Day of Surgery:  Upon arrival, a pre-op nurse and anesthesiologist will review your health history, obtain vital signs, and answer questions you may have.  The only belongings needed at this time will be your home medications and if applicable you C-PAP/BI-PAP machine.  If you are staying overnight, your family can leave the rest of your belongings in the car and bring them to your room later.  A pre-op nurse will start an IV and you may receive medication in preparation for surgery.  Due to patient privacy and limited space, only one member of your family will be able to accompany you in the pre-op area.  While you are in surgery your family should notify the waiting room  if they leave the waiting room area and provide a contact number.  Please be aware that surgery does come with discomfort.  We want to make every effort to control your discomfort so please discuss any uncontrolled symptoms with your nurse.  Your doctor will most likely have prescribed pain medications.  If you are going home after surgery you will receive individualized written care instructions before being discharged.  A responsible adult must drive you to and from the hospital on the day of surgery and stay with you for 24 hours.  If you are staying overnight following surgery, you will be transported to your hospital room following the recovery period.

## 2022-04-27 ENCOUNTER — HOSPITAL ENCOUNTER (OUTPATIENT)
Facility: HOSPITAL | Age: 37
Discharge: HOME | End: 2022-04-27
Attending: UROLOGY | Admitting: UROLOGY

## 2022-04-27 ENCOUNTER — APPOINTMENT (OUTPATIENT)
Dept: GENERAL RADIOLOGY | Facility: HOSPITAL | Age: 37
End: 2022-04-27

## 2022-04-27 ENCOUNTER — ANESTHESIA (OUTPATIENT)
Dept: PERIOP | Facility: HOSPITAL | Age: 37
End: 2022-04-27

## 2022-04-27 ENCOUNTER — ANESTHESIA EVENT (OUTPATIENT)
Dept: PERIOP | Facility: HOSPITAL | Age: 37
End: 2022-04-27

## 2022-04-27 VITALS
BODY MASS INDEX: 46.03 KG/M2 | WEIGHT: 269.6 LBS | HEIGHT: 64 IN | RESPIRATION RATE: 18 BRPM | HEART RATE: 95 BPM | SYSTOLIC BLOOD PRESSURE: 137 MMHG | DIASTOLIC BLOOD PRESSURE: 98 MMHG | OXYGEN SATURATION: 95 % | TEMPERATURE: 97.8 F

## 2022-04-27 DIAGNOSIS — N20.0 KIDNEY STONE: ICD-10-CM

## 2022-04-27 DIAGNOSIS — N20.1 URETERAL CALCULUS: Primary | ICD-10-CM

## 2022-04-27 LAB
GLUCOSE BLDC GLUCOMTR-MCNC: 182 MG/DL (ref 70–130)
GLUCOSE BLDC GLUCOMTR-MCNC: 214 MG/DL (ref 70–130)

## 2022-04-27 PROCEDURE — 76000 FLUOROSCOPY <1 HR PHYS/QHP: CPT | Performed by: RADIOLOGY

## 2022-04-27 PROCEDURE — C1769 GUIDE WIRE: HCPCS | Performed by: UROLOGY

## 2022-04-27 PROCEDURE — 52310 CYSTOSCOPY AND TREATMENT: CPT | Performed by: UROLOGY

## 2022-04-27 PROCEDURE — C1773 RET DEV, INSERTABLE: HCPCS | Performed by: UROLOGY

## 2022-04-27 PROCEDURE — 82962 GLUCOSE BLOOD TEST: CPT

## 2022-04-27 PROCEDURE — 25010000002 FENTANYL CITRATE (PF) 50 MCG/ML SOLUTION: Performed by: ANESTHESIOLOGY

## 2022-04-27 PROCEDURE — 25010000002 GENTAMICIN PER 80 MG: Performed by: UROLOGY

## 2022-04-27 PROCEDURE — 25010000002 FENTANYL CITRATE (PF) 50 MCG/ML SOLUTION: Performed by: NURSE ANESTHETIST, CERTIFIED REGISTERED

## 2022-04-27 PROCEDURE — 25010000002 ONDANSETRON PER 1 MG: Performed by: NURSE ANESTHETIST, CERTIFIED REGISTERED

## 2022-04-27 PROCEDURE — 25010000002 MIDAZOLAM PER 1 MG: Performed by: NURSE ANESTHETIST, CERTIFIED REGISTERED

## 2022-04-27 PROCEDURE — 25010000002 ONDANSETRON PER 1 MG: Performed by: ANESTHESIOLOGY

## 2022-04-27 PROCEDURE — 76000 FLUOROSCOPY <1 HR PHYS/QHP: CPT

## 2022-04-27 PROCEDURE — 25010000002 PROPOFOL 10 MG/ML EMULSION: Performed by: NURSE ANESTHETIST, CERTIFIED REGISTERED

## 2022-04-27 PROCEDURE — C1894 INTRO/SHEATH, NON-LASER: HCPCS | Performed by: UROLOGY

## 2022-04-27 RX ORDER — DROPERIDOL 2.5 MG/ML
0.62 INJECTION, SOLUTION INTRAMUSCULAR; INTRAVENOUS ONCE AS NEEDED
Status: DISCONTINUED | OUTPATIENT
Start: 2022-04-27 | End: 2022-04-27 | Stop reason: HOSPADM

## 2022-04-27 RX ORDER — ONDANSETRON 2 MG/ML
INJECTION INTRAMUSCULAR; INTRAVENOUS AS NEEDED
Status: DISCONTINUED | OUTPATIENT
Start: 2022-04-27 | End: 2022-04-27 | Stop reason: SURG

## 2022-04-27 RX ORDER — SODIUM CHLORIDE 0.9 % (FLUSH) 0.9 %
10 SYRINGE (ML) INJECTION AS NEEDED
Status: DISCONTINUED | OUTPATIENT
Start: 2022-04-27 | End: 2022-04-27 | Stop reason: HOSPADM

## 2022-04-27 RX ORDER — IPRATROPIUM BROMIDE AND ALBUTEROL SULFATE 2.5; .5 MG/3ML; MG/3ML
3 SOLUTION RESPIRATORY (INHALATION) ONCE AS NEEDED
Status: DISCONTINUED | OUTPATIENT
Start: 2022-04-27 | End: 2022-04-27 | Stop reason: HOSPADM

## 2022-04-27 RX ORDER — FENTANYL CITRATE 50 UG/ML
50 INJECTION, SOLUTION INTRAMUSCULAR; INTRAVENOUS
Status: DISCONTINUED | OUTPATIENT
Start: 2022-04-27 | End: 2022-04-27 | Stop reason: HOSPADM

## 2022-04-27 RX ORDER — LIDOCAINE HYDROCHLORIDE 20 MG/ML
INJECTION, SOLUTION INFILTRATION; PERINEURAL AS NEEDED
Status: DISCONTINUED | OUTPATIENT
Start: 2022-04-27 | End: 2022-04-27 | Stop reason: SURG

## 2022-04-27 RX ORDER — MEPERIDINE HYDROCHLORIDE 25 MG/ML
12.5 INJECTION INTRAMUSCULAR; INTRAVENOUS; SUBCUTANEOUS
Status: DISCONTINUED | OUTPATIENT
Start: 2022-04-27 | End: 2022-04-27 | Stop reason: HOSPADM

## 2022-04-27 RX ORDER — METOPROLOL TARTRATE 5 MG/5ML
INJECTION INTRAVENOUS AS NEEDED
Status: DISCONTINUED | OUTPATIENT
Start: 2022-04-27 | End: 2022-04-27 | Stop reason: SURG

## 2022-04-27 RX ORDER — GENTAMICIN SULFATE 80 MG/100ML
80 INJECTION, SOLUTION INTRAVENOUS ONCE
Status: COMPLETED | OUTPATIENT
Start: 2022-04-27 | End: 2022-04-27

## 2022-04-27 RX ORDER — OXYCODONE HYDROCHLORIDE AND ACETAMINOPHEN 5; 325 MG/1; MG/1
1 TABLET ORAL ONCE AS NEEDED
Status: COMPLETED | OUTPATIENT
Start: 2022-04-27 | End: 2022-04-27

## 2022-04-27 RX ORDER — ONDANSETRON 2 MG/ML
4 INJECTION INTRAMUSCULAR; INTRAVENOUS AS NEEDED
Status: DISCONTINUED | OUTPATIENT
Start: 2022-04-27 | End: 2022-04-27 | Stop reason: HOSPADM

## 2022-04-27 RX ORDER — OXYCODONE AND ACETAMINOPHEN 10; 325 MG/1; MG/1
1 TABLET ORAL EVERY 4 HOURS PRN
Qty: 12 TABLET | Refills: 0 | Status: SHIPPED | OUTPATIENT
Start: 2022-04-27 | End: 2022-05-24 | Stop reason: SDUPTHER

## 2022-04-27 RX ORDER — SODIUM CHLORIDE 0.9 % (FLUSH) 0.9 %
10 SYRINGE (ML) INJECTION EVERY 12 HOURS SCHEDULED
Status: DISCONTINUED | OUTPATIENT
Start: 2022-04-27 | End: 2022-04-27 | Stop reason: HOSPADM

## 2022-04-27 RX ORDER — SODIUM CHLORIDE, SODIUM LACTATE, POTASSIUM CHLORIDE, CALCIUM CHLORIDE 600; 310; 30; 20 MG/100ML; MG/100ML; MG/100ML; MG/100ML
100 INJECTION, SOLUTION INTRAVENOUS ONCE AS NEEDED
Status: DISCONTINUED | OUTPATIENT
Start: 2022-04-27 | End: 2022-04-27 | Stop reason: HOSPADM

## 2022-04-27 RX ORDER — MIDAZOLAM HYDROCHLORIDE 1 MG/ML
INJECTION INTRAMUSCULAR; INTRAVENOUS AS NEEDED
Status: DISCONTINUED | OUTPATIENT
Start: 2022-04-27 | End: 2022-04-27 | Stop reason: SURG

## 2022-04-27 RX ORDER — FENTANYL CITRATE 50 UG/ML
INJECTION, SOLUTION INTRAMUSCULAR; INTRAVENOUS AS NEEDED
Status: DISCONTINUED | OUTPATIENT
Start: 2022-04-27 | End: 2022-04-27 | Stop reason: SURG

## 2022-04-27 RX ORDER — FENTANYL CITRATE 50 UG/ML
100 INJECTION, SOLUTION INTRAMUSCULAR; INTRAVENOUS ONCE
Status: COMPLETED | OUTPATIENT
Start: 2022-04-27 | End: 2022-04-27

## 2022-04-27 RX ORDER — SODIUM CHLORIDE, SODIUM LACTATE, POTASSIUM CHLORIDE, CALCIUM CHLORIDE 600; 310; 30; 20 MG/100ML; MG/100ML; MG/100ML; MG/100ML
125 INJECTION, SOLUTION INTRAVENOUS ONCE
Status: COMPLETED | OUTPATIENT
Start: 2022-04-27 | End: 2022-04-27

## 2022-04-27 RX ORDER — MIDAZOLAM HYDROCHLORIDE 1 MG/ML
1 INJECTION INTRAMUSCULAR; INTRAVENOUS
Status: DISCONTINUED | OUTPATIENT
Start: 2022-04-27 | End: 2022-04-27 | Stop reason: HOSPADM

## 2022-04-27 RX ORDER — FAMOTIDINE 10 MG/ML
INJECTION, SOLUTION INTRAVENOUS AS NEEDED
Status: DISCONTINUED | OUTPATIENT
Start: 2022-04-27 | End: 2022-04-27 | Stop reason: SURG

## 2022-04-27 RX ORDER — SODIUM CHLORIDE, SODIUM LACTATE, POTASSIUM CHLORIDE, CALCIUM CHLORIDE 600; 310; 30; 20 MG/100ML; MG/100ML; MG/100ML; MG/100ML
INJECTION, SOLUTION INTRAVENOUS CONTINUOUS PRN
Status: DISCONTINUED | OUTPATIENT
Start: 2022-04-27 | End: 2022-04-27 | Stop reason: SURG

## 2022-04-27 RX ORDER — PROPOFOL 10 MG/ML
VIAL (ML) INTRAVENOUS AS NEEDED
Status: DISCONTINUED | OUTPATIENT
Start: 2022-04-27 | End: 2022-04-27 | Stop reason: SURG

## 2022-04-27 RX ORDER — ONDANSETRON 2 MG/ML
4 INJECTION INTRAMUSCULAR; INTRAVENOUS ONCE
Status: COMPLETED | OUTPATIENT
Start: 2022-04-27 | End: 2022-04-27

## 2022-04-27 RX ORDER — MAGNESIUM HYDROXIDE 1200 MG/15ML
LIQUID ORAL AS NEEDED
Status: DISCONTINUED | OUTPATIENT
Start: 2022-04-27 | End: 2022-04-27 | Stop reason: HOSPADM

## 2022-04-27 RX ADMIN — ONDANSETRON 4 MG: 2 INJECTION INTRAMUSCULAR; INTRAVENOUS at 10:45

## 2022-04-27 RX ADMIN — LIDOCAINE HYDROCHLORIDE 20 MG: 20 INJECTION, SOLUTION INFILTRATION; PERINEURAL at 10:52

## 2022-04-27 RX ADMIN — METOPROLOL TARTRATE 5 MG: 1 INJECTION, SOLUTION INTRAVENOUS at 11:27

## 2022-04-27 RX ADMIN — GENTAMICIN SULFATE 80 MG: 80 INJECTION, SOLUTION INTRAVENOUS at 10:54

## 2022-04-27 RX ADMIN — FAMOTIDINE 20 MG: 10 INJECTION INTRAVENOUS at 10:45

## 2022-04-27 RX ADMIN — FENTANYL CITRATE 50 MCG: 50 INJECTION INTRAMUSCULAR; INTRAVENOUS at 11:44

## 2022-04-27 RX ADMIN — SODIUM CHLORIDE, POTASSIUM CHLORIDE, SODIUM LACTATE AND CALCIUM CHLORIDE: 600; 310; 30; 20 INJECTION, SOLUTION INTRAVENOUS at 10:45

## 2022-04-27 RX ADMIN — FENTANYL CITRATE 50 MCG: 50 INJECTION INTRAMUSCULAR; INTRAVENOUS at 11:51

## 2022-04-27 RX ADMIN — FENTANYL CITRATE 100 MCG: 50 INJECTION INTRAMUSCULAR; INTRAVENOUS at 10:15

## 2022-04-27 RX ADMIN — ONDANSETRON 4 MG: 2 INJECTION INTRAMUSCULAR; INTRAVENOUS at 10:24

## 2022-04-27 RX ADMIN — FENTANYL CITRATE 50 MCG: 50 INJECTION INTRAMUSCULAR; INTRAVENOUS at 10:45

## 2022-04-27 RX ADMIN — FENTANYL CITRATE 50 MCG: 50 INJECTION INTRAMUSCULAR; INTRAVENOUS at 11:03

## 2022-04-27 RX ADMIN — OXYCODONE HYDROCHLORIDE AND ACETAMINOPHEN 1 TABLET: 5; 325 TABLET ORAL at 12:12

## 2022-04-27 RX ADMIN — SODIUM CHLORIDE, POTASSIUM CHLORIDE, SODIUM LACTATE AND CALCIUM CHLORIDE 125 ML/HR: 600; 310; 30; 20 INJECTION, SOLUTION INTRAVENOUS at 10:12

## 2022-04-27 RX ADMIN — MIDAZOLAM 2 MG: 1 INJECTION INTRAMUSCULAR; INTRAVENOUS at 10:45

## 2022-04-27 RX ADMIN — PROPOFOL 180 MG: 10 INJECTION, EMULSION INTRAVENOUS at 10:52

## 2022-04-27 NOTE — ANESTHESIA PROCEDURE NOTES
Airway  Urgency: elective    Date/Time: 4/27/2022 10:52 AM  Airway not difficult    General Information and Staff    Patient location during procedure: OR  CRNA/CAA: Alyson Hope CRNA    Indications and Patient Condition  Indications for airway management: airway protection    Preoxygenated: yes  MILS maintained throughout  Mask difficulty assessment: 0 - not attempted    Final Airway Details  Final airway type: supraglottic airway      Successful airway: unique  Size 4    Number of attempts at approach: 1  Assessment: lips, teeth, and gum same as pre-op

## 2022-04-27 NOTE — ANESTHESIA POSTPROCEDURE EVALUATION
Patient: Natalia Arzate    Procedure Summary     Date: 04/27/22 Room / Location: [facility] OR 06 /  [facility] OR    Anesthesia Start: 1045 Anesthesia Stop: 1122    Procedure: URETEROSCOPY STENT REMOVAL (Left ) Diagnosis:       Kidney stone      (Kidney stone [N20.0])    Surgeons: Milan Motley MD Provider: Will Mckeon MD    Anesthesia Type: general ASA Status: 3          Anesthesia Type: general    Vitals  Vitals Value Taken Time   /93 04/27/22 1148   Temp 98 °F (36.7 °C) 04/27/22 1123   Pulse 95 04/27/22 1148   Resp 26 04/27/22 1148   SpO2 92 % 04/27/22 1148           Post Anesthesia Care and Evaluation    Patient location during evaluation: PACU  Patient participation: complete - patient participated  Level of consciousness: sleepy but conscious and responsive to verbal stimuli  Pain score: 2  Pain management: adequate  Airway patency: patent  Anesthetic complications: No anesthetic complications  PONV Status: none  Cardiovascular status: stable  Respiratory status: nasal cannula  Hydration status: acceptable

## 2022-04-27 NOTE — ANESTHESIA PREPROCEDURE EVALUATION
Anesthesia Evaluation     Patient summary reviewed and Nursing notes reviewed   no history of anesthetic complications:  NPO Solid Status: > 8 hours  NPO Liquid Status: > 8 hours           Airway   Mallampati: II  TM distance: >3 FB  Neck ROM: full  No difficulty expected  Dental    (+) poor dentition        Pulmonary     breath sounds clear to auscultation  (+) pulmonary embolism, asthma,  Cardiovascular     Rhythm: regular  Rate: normal    (+) hypertension, dysrhythmias, DVT, hyperlipidemia,       Neuro/Psych  (+) headaches, psychiatric history,    (-) TIA  GI/Hepatic/Renal/Endo    (+) obesity, morbid obesity, GERD,  renal disease, diabetes mellitus,     Musculoskeletal     Abdominal     Abdomen: soft.   Substance History      OB/GYN    (+) Preeclampsia, pregnancy induced hypertension        Other      history of cancer                      Anesthesia Plan    ASA 3     general     intravenous induction     Anesthetic plan, all risks, benefits, and alternatives have been provided, discussed and informed consent has been obtained with: patient.    Plan discussed with CRNA.

## 2022-04-28 ENCOUNTER — OFFICE VISIT (OUTPATIENT)
Dept: FAMILY MEDICINE CLINIC | Facility: CLINIC | Age: 37
End: 2022-04-28

## 2022-04-28 DIAGNOSIS — D68.51 FACTOR 5 LEIDEN MUTATION, HETEROZYGOUS: Chronic | ICD-10-CM

## 2022-04-28 DIAGNOSIS — Z79.4 TYPE 2 DIABETES MELLITUS WITH DIABETIC NEUROPATHY, WITH LONG-TERM CURRENT USE OF INSULIN: Primary | Chronic | ICD-10-CM

## 2022-04-28 DIAGNOSIS — E11.40 TYPE 2 DIABETES MELLITUS WITH DIABETIC NEUROPATHY, WITH LONG-TERM CURRENT USE OF INSULIN: Primary | Chronic | ICD-10-CM

## 2022-04-28 DIAGNOSIS — L89.156 PRESSURE INJURY OF DEEP TISSUE OF SACRAL REGION: ICD-10-CM

## 2022-04-28 DIAGNOSIS — E55.9 VITAMIN D DEFICIENCY: ICD-10-CM

## 2022-04-28 DIAGNOSIS — I10 PRIMARY HYPERTENSION: Chronic | ICD-10-CM

## 2022-04-28 DIAGNOSIS — R49.0 HOARSENESS, PERSISTENT: ICD-10-CM

## 2022-04-28 DIAGNOSIS — J30.9 ALLERGIC RHINITIS, UNSPECIFIED SEASONALITY, UNSPECIFIED TRIGGER: ICD-10-CM

## 2022-04-28 DIAGNOSIS — M1A.9XX0 CHRONIC GOUT WITHOUT TOPHUS, UNSPECIFIED CAUSE, UNSPECIFIED SITE: Chronic | ICD-10-CM

## 2022-04-28 DIAGNOSIS — J45.909 MODERATE ASTHMA, UNSPECIFIED WHETHER COMPLICATED, UNSPECIFIED WHETHER PERSISTENT: Chronic | ICD-10-CM

## 2022-04-28 DIAGNOSIS — F33.0 MAJOR DEPRESSIVE DISORDER, RECURRENT EPISODE, MILD DEGREE: Chronic | ICD-10-CM

## 2022-04-28 DIAGNOSIS — Z51.81 ENCOUNTER FOR MEDICATION MONITORING: ICD-10-CM

## 2022-04-28 DIAGNOSIS — E53.8 VITAMIN B 12 DEFICIENCY: ICD-10-CM

## 2022-04-28 DIAGNOSIS — R05.9 COUGH: ICD-10-CM

## 2022-04-28 LAB
25(OH)D3 SERPL-MCNC: 12.4 NG/ML (ref 30–100)
ALBUMIN SERPL-MCNC: 3.9 G/DL (ref 3.5–5.2)
ALBUMIN/GLOB SERPL: 1.1 G/DL
ALP SERPL-CCNC: 107 U/L (ref 39–117)
ALT SERPL W P-5'-P-CCNC: 19 U/L (ref 1–33)
ANION GAP SERPL CALCULATED.3IONS-SCNC: 14.6 MMOL/L (ref 5–15)
AST SERPL-CCNC: 17 U/L (ref 1–32)
BILIRUB SERPL-MCNC: 0.5 MG/DL (ref 0–1.2)
BUN SERPL-MCNC: 20 MG/DL (ref 6–20)
BUN/CREAT SERPL: 27 (ref 7–25)
CALCIUM SPEC-SCNC: 9.6 MG/DL (ref 8.6–10.5)
CHLORIDE SERPL-SCNC: 100 MMOL/L (ref 98–107)
CO2 SERPL-SCNC: 21.4 MMOL/L (ref 22–29)
CREAT SERPL-MCNC: 0.74 MG/DL (ref 0.57–1)
EGFRCR SERPLBLD CKD-EPI 2021: 107.7 ML/MIN/1.73
GLOBULIN UR ELPH-MCNC: 3.6 GM/DL
GLUCOSE SERPL-MCNC: 162 MG/DL (ref 65–99)
POTASSIUM SERPL-SCNC: 4.4 MMOL/L (ref 3.5–5.2)
PROT SERPL-MCNC: 7.5 G/DL (ref 6–8.5)
SODIUM SERPL-SCNC: 136 MMOL/L (ref 136–145)
URATE SERPL-MCNC: 7.8 MG/DL (ref 2.4–5.7)
VIT B12 BLD-MCNC: 159 PG/ML (ref 211–946)

## 2022-04-28 PROCEDURE — 82607 VITAMIN B-12: CPT | Performed by: NURSE PRACTITIONER

## 2022-04-28 PROCEDURE — 80053 COMPREHEN METABOLIC PANEL: CPT | Performed by: NURSE PRACTITIONER

## 2022-04-28 PROCEDURE — 84550 ASSAY OF BLOOD/URIC ACID: CPT | Performed by: NURSE PRACTITIONER

## 2022-04-28 PROCEDURE — 99214 OFFICE O/P EST MOD 30 MIN: CPT | Performed by: NURSE PRACTITIONER

## 2022-04-28 PROCEDURE — 82306 VITAMIN D 25 HYDROXY: CPT | Performed by: NURSE PRACTITIONER

## 2022-04-28 RX ORDER — AZELASTINE 1 MG/ML
SPRAY, METERED NASAL
Qty: 1 EACH | Refills: 2 | Status: SHIPPED | OUTPATIENT
Start: 2022-04-28 | End: 2022-07-21 | Stop reason: SDUPTHER

## 2022-04-28 RX ORDER — DEXTROMETHORPHAN HYDROBROMIDE AND PROMETHAZINE HYDROCHLORIDE 15; 6.25 MG/5ML; MG/5ML
5 SYRUP ORAL 2 TIMES DAILY PRN
Qty: 180 ML | Refills: 1 | Status: SHIPPED | OUTPATIENT
Start: 2022-04-28 | End: 2022-06-22 | Stop reason: SDUPTHER

## 2022-04-28 RX ORDER — MONTELUKAST SODIUM 10 MG/1
10 TABLET ORAL NIGHTLY
Qty: 30 TABLET | Refills: 2 | Status: SHIPPED | OUTPATIENT
Start: 2022-04-28 | End: 2022-05-23 | Stop reason: SDUPTHER

## 2022-04-28 RX ORDER — ALBUTEROL SULFATE 90 UG/1
2 AEROSOL, METERED RESPIRATORY (INHALATION) EVERY 4 HOURS PRN
Qty: 18 G | Refills: 2 | Status: SHIPPED | OUTPATIENT
Start: 2022-04-28 | End: 2022-07-21 | Stop reason: SDUPTHER

## 2022-04-28 RX ORDER — AMLODIPINE BESYLATE 10 MG/1
10 TABLET ORAL DAILY
Qty: 30 TABLET | Refills: 2 | Status: SHIPPED | OUTPATIENT
Start: 2022-04-28 | End: 2022-08-24 | Stop reason: SDUPTHER

## 2022-04-28 RX ORDER — DULOXETIN HYDROCHLORIDE 20 MG/1
20 CAPSULE, DELAYED RELEASE ORAL DAILY
Qty: 30 CAPSULE | Refills: 2 | Status: SHIPPED | OUTPATIENT
Start: 2022-04-28 | End: 2022-05-23 | Stop reason: SDUPTHER

## 2022-05-01 VITALS
OXYGEN SATURATION: 98 % | BODY MASS INDEX: 44.56 KG/M2 | SYSTOLIC BLOOD PRESSURE: 130 MMHG | DIASTOLIC BLOOD PRESSURE: 80 MMHG | WEIGHT: 261 LBS | HEIGHT: 64 IN | RESPIRATION RATE: 14 BRPM | HEART RATE: 97 BPM | TEMPERATURE: 97.8 F

## 2022-05-01 PROBLEM — R49.0 HOARSENESS, PERSISTENT: Status: ACTIVE | Noted: 2022-05-01

## 2022-05-01 RX ORDER — ALLOPURINOL 100 MG/1
100 TABLET ORAL DAILY
Qty: 30 TABLET | Refills: 0 | Status: SHIPPED | OUTPATIENT
Start: 2022-05-01 | End: 2022-05-23 | Stop reason: SDUPTHER

## 2022-05-08 ENCOUNTER — HOSPITAL ENCOUNTER (EMERGENCY)
Facility: HOSPITAL | Age: 37
Discharge: HOME | End: 2022-05-08
Attending: EMERGENCY MEDICINE | Admitting: EMERGENCY MEDICINE

## 2022-05-08 ENCOUNTER — APPOINTMENT (OUTPATIENT)
Dept: CT IMAGING | Facility: HOSPITAL | Age: 37
End: 2022-05-08

## 2022-05-08 VITALS
SYSTOLIC BLOOD PRESSURE: 172 MMHG | TEMPERATURE: 99 F | RESPIRATION RATE: 16 BRPM | OXYGEN SATURATION: 98 % | HEART RATE: 116 BPM | WEIGHT: 240 LBS | HEIGHT: 64 IN | BODY MASS INDEX: 40.97 KG/M2 | DIASTOLIC BLOOD PRESSURE: 108 MMHG

## 2022-05-08 DIAGNOSIS — N20.1 URETEROLITHIASIS: ICD-10-CM

## 2022-05-08 DIAGNOSIS — R11.2 NAUSEA AND VOMITING, UNSPECIFIED VOMITING TYPE: ICD-10-CM

## 2022-05-08 DIAGNOSIS — N39.0 ACUTE LOWER UTI: Primary | ICD-10-CM

## 2022-05-08 DIAGNOSIS — N23 RENAL COLIC ON LEFT SIDE: ICD-10-CM

## 2022-05-08 LAB
ALBUMIN SERPL-MCNC: 4.31 G/DL (ref 3.5–5.2)
ALBUMIN/GLOB SERPL: 1.3 G/DL
ALP SERPL-CCNC: 118 U/L (ref 39–117)
ALT SERPL W P-5'-P-CCNC: 18 U/L (ref 1–33)
ANION GAP SERPL CALCULATED.3IONS-SCNC: 16.6 MMOL/L (ref 5–15)
AST SERPL-CCNC: 18 U/L (ref 1–32)
BACTERIA UR QL AUTO: ABNORMAL /HPF
BASOPHILS # BLD AUTO: 0.02 10*3/MM3 (ref 0–0.2)
BASOPHILS NFR BLD AUTO: 0.4 % (ref 0–1.5)
BILIRUB SERPL-MCNC: 0.4 MG/DL (ref 0–1.2)
BILIRUB UR QL STRIP: NEGATIVE
BUN SERPL-MCNC: 19 MG/DL (ref 6–20)
BUN/CREAT SERPL: 27.1 (ref 7–25)
CALCIUM SPEC-SCNC: 9.7 MG/DL (ref 8.6–10.5)
CHLORIDE SERPL-SCNC: 97 MMOL/L (ref 98–107)
CLARITY UR: CLEAR
CO2 SERPL-SCNC: 19.4 MMOL/L (ref 22–29)
COLOR UR: YELLOW
CREAT SERPL-MCNC: 0.7 MG/DL (ref 0.57–1)
DEPRECATED RDW RBC AUTO: 46.3 FL (ref 37–54)
EGFRCR SERPLBLD CKD-EPI 2021: 115.1 ML/MIN/1.73
EOSINOPHIL # BLD AUTO: 0.09 10*3/MM3 (ref 0–0.4)
EOSINOPHIL NFR BLD AUTO: 1.6 % (ref 0.3–6.2)
ERYTHROCYTE [DISTWIDTH] IN BLOOD BY AUTOMATED COUNT: 13.9 % (ref 12.3–15.4)
GLOBULIN UR ELPH-MCNC: 3.4 GM/DL
GLUCOSE SERPL-MCNC: 208 MG/DL (ref 65–99)
GLUCOSE UR STRIP-MCNC: ABNORMAL MG/DL
HCT VFR BLD AUTO: 35.9 % (ref 34–46.6)
HGB BLD-MCNC: 12.6 G/DL (ref 12–15.9)
HGB UR QL STRIP.AUTO: ABNORMAL
HOLD SPECIMEN: NORMAL
HOLD SPECIMEN: NORMAL
HYALINE CASTS UR QL AUTO: ABNORMAL /LPF
IMM GRANULOCYTES # BLD AUTO: 0.03 10*3/MM3 (ref 0–0.05)
IMM GRANULOCYTES NFR BLD AUTO: 0.5 % (ref 0–0.5)
KETONES UR QL STRIP: NEGATIVE
LEUKOCYTE ESTERASE UR QL STRIP.AUTO: ABNORMAL
LIPASE SERPL-CCNC: 28 U/L (ref 13–60)
LYMPHOCYTES # BLD AUTO: 1.77 10*3/MM3 (ref 0.7–3.1)
LYMPHOCYTES NFR BLD AUTO: 32.2 % (ref 19.6–45.3)
MCH RBC QN AUTO: 32.1 PG (ref 26.6–33)
MCHC RBC AUTO-ENTMCNC: 35.1 G/DL (ref 31.5–35.7)
MCV RBC AUTO: 91.6 FL (ref 79–97)
MONOCYTES # BLD AUTO: 0.49 10*3/MM3 (ref 0.1–0.9)
MONOCYTES NFR BLD AUTO: 8.9 % (ref 5–12)
NEUTROPHILS NFR BLD AUTO: 3.1 10*3/MM3 (ref 1.7–7)
NEUTROPHILS NFR BLD AUTO: 56.4 % (ref 42.7–76)
NITRITE UR QL STRIP: NEGATIVE
NRBC BLD AUTO-RTO: 0 /100 WBC (ref 0–0.2)
PH UR STRIP.AUTO: <=5 [PH] (ref 5–8)
PLATELET # BLD AUTO: 221 10*3/MM3 (ref 140–450)
PMV BLD AUTO: 8.6 FL (ref 6–12)
POTASSIUM SERPL-SCNC: 4.2 MMOL/L (ref 3.5–5.2)
PROT SERPL-MCNC: 7.7 G/DL (ref 6–8.5)
PROT UR QL STRIP: ABNORMAL
RBC # BLD AUTO: 3.92 10*6/MM3 (ref 3.77–5.28)
RBC # UR STRIP: ABNORMAL /HPF
REF LAB TEST METHOD: ABNORMAL
SODIUM SERPL-SCNC: 133 MMOL/L (ref 136–145)
SP GR UR STRIP: 1.02 (ref 1–1.03)
SQUAMOUS #/AREA URNS HPF: ABNORMAL /HPF
UROBILINOGEN UR QL STRIP: ABNORMAL
WBC # UR STRIP: ABNORMAL /HPF
WBC NRBC COR # BLD: 5.5 10*3/MM3 (ref 3.4–10.8)
WHOLE BLOOD HOLD SPECIMEN: NORMAL
WHOLE BLOOD HOLD SPECIMEN: NORMAL

## 2022-05-08 PROCEDURE — 81001 URINALYSIS AUTO W/SCOPE: CPT | Performed by: EMERGENCY MEDICINE

## 2022-05-08 PROCEDURE — 74176 CT ABD & PELVIS W/O CONTRAST: CPT

## 2022-05-08 PROCEDURE — 25010000002 MORPHINE PER 10 MG: Performed by: EMERGENCY MEDICINE

## 2022-05-08 PROCEDURE — 83690 ASSAY OF LIPASE: CPT | Performed by: PHYSICIAN ASSISTANT

## 2022-05-08 PROCEDURE — 85025 COMPLETE CBC W/AUTO DIFF WBC: CPT | Performed by: PHYSICIAN ASSISTANT

## 2022-05-08 PROCEDURE — 99283 EMERGENCY DEPT VISIT LOW MDM: CPT

## 2022-05-08 PROCEDURE — 25010000002 HYDROMORPHONE 1 MG/ML SOLUTION: Performed by: EMERGENCY MEDICINE

## 2022-05-08 PROCEDURE — P9612 CATHETERIZE FOR URINE SPEC: HCPCS

## 2022-05-08 PROCEDURE — 96374 THER/PROPH/DIAG INJ IV PUSH: CPT

## 2022-05-08 PROCEDURE — 87086 URINE CULTURE/COLONY COUNT: CPT | Performed by: EMERGENCY MEDICINE

## 2022-05-08 PROCEDURE — 36415 COLL VENOUS BLD VENIPUNCTURE: CPT

## 2022-05-08 PROCEDURE — 96375 TX/PRO/DX INJ NEW DRUG ADDON: CPT

## 2022-05-08 PROCEDURE — 25010000002 ONDANSETRON PER 1 MG: Performed by: EMERGENCY MEDICINE

## 2022-05-08 PROCEDURE — 80053 COMPREHEN METABOLIC PANEL: CPT | Performed by: PHYSICIAN ASSISTANT

## 2022-05-08 RX ORDER — SODIUM CHLORIDE 0.9 % (FLUSH) 0.9 %
10 SYRINGE (ML) INJECTION AS NEEDED
Status: DISCONTINUED | OUTPATIENT
Start: 2022-05-08 | End: 2022-05-08 | Stop reason: HOSPADM

## 2022-05-08 RX ORDER — ONDANSETRON 2 MG/ML
4 INJECTION INTRAMUSCULAR; INTRAVENOUS ONCE
Status: COMPLETED | OUTPATIENT
Start: 2022-05-08 | End: 2022-05-08

## 2022-05-08 RX ORDER — OXYCODONE AND ACETAMINOPHEN 7.5; 325 MG/1; MG/1
1 TABLET ORAL EVERY 4 HOURS PRN
Qty: 12 TABLET | Refills: 0 | Status: SHIPPED | OUTPATIENT
Start: 2022-05-08 | End: 2022-06-09 | Stop reason: SDUPTHER

## 2022-05-08 RX ORDER — TAMSULOSIN HYDROCHLORIDE 0.4 MG/1
1 CAPSULE ORAL DAILY
Qty: 30 CAPSULE | Refills: 0 | Status: SHIPPED | OUTPATIENT
Start: 2022-05-08 | End: 2022-06-24

## 2022-05-08 RX ORDER — CEPHALEXIN 500 MG/1
500 CAPSULE ORAL 2 TIMES DAILY
Qty: 20 CAPSULE | Refills: 0 | Status: SHIPPED | OUTPATIENT
Start: 2022-05-08 | End: 2022-06-09

## 2022-05-08 RX ORDER — ONDANSETRON 4 MG/1
4 TABLET, ORALLY DISINTEGRATING ORAL EVERY 6 HOURS PRN
Qty: 12 TABLET | Refills: 0 | Status: SHIPPED | OUTPATIENT
Start: 2022-05-08 | End: 2022-06-24

## 2022-05-08 RX ADMIN — HYDROMORPHONE HYDROCHLORIDE 1 MG: 1 INJECTION, SOLUTION INTRAMUSCULAR; INTRAVENOUS; SUBCUTANEOUS at 16:04

## 2022-05-08 RX ADMIN — MORPHINE SULFATE 4 MG: 4 INJECTION, SOLUTION INTRAMUSCULAR; INTRAVENOUS at 14:00

## 2022-05-08 RX ADMIN — SODIUM CHLORIDE 1000 ML: 9 INJECTION, SOLUTION INTRAVENOUS at 13:58

## 2022-05-08 RX ADMIN — ONDANSETRON 4 MG: 2 INJECTION INTRAMUSCULAR; INTRAVENOUS at 13:58

## 2022-05-08 NOTE — ED PROVIDER NOTES
Subjective   This is a 37-year-old female who presents to the emergency department chief complaint right-sided flank pain described as sharp, stabbing pain.  Patient denies that the pain radiates.  To the right lower quadrant.      History provided by:  Patient   used: No    Flank Pain  Pain location:  R flank  Pain quality: sharp and stabbing    Pain radiates to:  Does not radiate  Pain severity:  Moderate  Onset quality:  Gradual  Duration:  2 days  Timing:  Intermittent  Progression:  Worsening  Chronicity:  New  Context: not awakening from sleep, not diet changes, not eating, not previous surgeries, not recent illness, not recent sexual activity, not retching, not sick contacts, not suspicious food intake and not trauma    Relieved by:  Nothing  Worsened by:  Nothing  Ineffective treatments:  None tried  Associated symptoms: chills, fatigue and nausea    Associated symptoms: no anorexia, no belching, no chest pain, no cough, no flatus, no hematemesis, no hematochezia, no shortness of breath, no sore throat and no vaginal bleeding    Risk factors: no alcohol abuse, no aspirin use, not elderly, has not had multiple surgeries, no NSAID use, not obese, not pregnant and no recent hospitalization        Review of Systems   Constitutional: Positive for chills and fatigue.   HENT: Negative.  Negative for dental problem, drooling, ear pain, facial swelling, hearing loss, mouth sores, nosebleeds and sore throat.    Eyes: Negative.  Negative for pain, discharge and itching.   Respiratory: Negative.  Negative for cough, choking, chest tightness, shortness of breath and stridor.    Cardiovascular: Negative.  Negative for chest pain, palpitations and leg swelling.   Gastrointestinal: Positive for nausea. Negative for abdominal pain, anal bleeding, anorexia, blood in stool, flatus, hematemesis and hematochezia.   Endocrine: Negative.  Negative for cold intolerance, heat intolerance, polydipsia, polyphagia  and polyuria.   Genitourinary: Positive for flank pain. Negative for vaginal bleeding.   Musculoskeletal: Negative for back pain, gait problem, joint swelling and myalgias.   Skin: Negative.  Negative for color change, pallor and wound.   Neurological: Negative.  Negative for seizures, syncope, facial asymmetry, speech difficulty, light-headedness, numbness and headaches.   Hematological: Negative.  Negative for adenopathy. Does not bruise/bleed easily.   Psychiatric/Behavioral: Negative.  Negative for agitation, behavioral problems, confusion, decreased concentration, dysphoric mood and hallucinations. The patient is not nervous/anxious.    All other systems reviewed and are negative.      Past Medical History:   Diagnosis Date   • A-fib (HCC)    • Abnormal ECG    • Anemia    • Anxiety    • Asthma    • Cancer (HCC)     Ovarian   • Depression    • Diabetes mellitus (HCC)    • DVT (deep venous thrombosis) (HCC)    • Factor 5 Leiden mutation, heterozygous (HCC)    • Fibroid    • GERD (gastroesophageal reflux disease)    • Gout    • H/O abdominal abscess    • History of sepsis    • History of transfusion    • Hyperlipidemia    • Hypothyroid    • Kidney stone    • Migraines    • Neuropathy    • Ovarian cancer (HCC) 2021   • Ovarian cyst    • PE (pulmonary embolism)    • Polycystic ovary syndrome    • Preeclampsia    • Rh incompatibility    • Stroke (HCC)    • TIA (transient ischemic attack)    • Urinary tract infection    • Varicella        Allergies   Allergen Reactions   • Toradol [Ketorolac Tromethamine] Anaphylaxis and Hives   • Haldol [Haloperidol] Hives and Mental Status Change   • Tramadol Hives and Swelling   • Amoxicillin Hives and Rash   • Penicillins Hives and Rash       Past Surgical History:   Procedure Laterality Date   • ABDOMINAL SURGERY     • CARDIAC CATHETERIZATION     •  SECTION     • CHOLECYSTECTOMY     • COLONOSCOPY     • ENDOSCOPY     • EXTRACORPOREAL SHOCK WAVE LITHOTRIPSY (ESWL) Left  "10/22/2021    Procedure: EXTRACORPOREAL SHOCKWAVE LITHOTRIPSY;  Surgeon: Milan Motley MD;  Location: [facility] OR;  Service: Urology;  Laterality: Left;   • LITHOTRIPSY     • RIGHT OOPHORECTOMY     • URETEROSCOPY LASER LITHOTRIPSY WITH STENT INSERTION Left 10/01/2021    Procedure: URETEROSCOPY WITH STENT PLACEMENT;  Surgeon: Milan Motley MD;  Location: [facility] OR;  Service: Urology;  Laterality: Left;   • URETEROSCOPY LASER LITHOTRIPSY WITH STENT INSERTION Left 4/27/2022    Procedure: URETEROSCOPY STENT REMOVAL;  Surgeon: Milan Motley MD;  Location: [facility] OR;  Service: Urology;  Laterality: Left;       Family History   Problem Relation Age of Onset   • Hypertension Mother    • Diabetes Father    • Hypertension Father    • Heart attack Father    • No Known Problems Sister    • No Known Problems Brother    • No Known Problems Son    • No Known Problems Daughter    • No Known Problems Maternal Grandmother    • No Known Problems Maternal Grandfather    • No Known Problems Paternal Grandmother    • No Known Problems Paternal Grandfather    • No Known Problems Cousin    • Rheum arthritis Neg Hx    • Osteoarthritis Neg Hx    • Asthma Neg Hx    • Heart failure Neg Hx    • Hyperlipidemia Neg Hx    • Migraines Neg Hx    • Rashes / Skin problems Neg Hx    • Seizures Neg Hx    • Stroke Neg Hx    • Thyroid disease Neg Hx        Social History     Socioeconomic History   • Marital status:    Tobacco Use   • Smoking status: Never Smoker   • Smokeless tobacco: Never Used   Vaping Use   • Vaping Use: Never used   Substance and Sexual Activity   • Alcohol use: No   • Drug use: Never     Comment: Pt has track marks on right arm. Pt states \"It's from my INR being drawn.\"    • Sexual activity: Not Currently     Partners: Male     Birth control/protection: None           Objective   Physical Exam  Vitals and nursing note reviewed.   Constitutional:       General: She is not in acute distress.     " Appearance: Normal appearance. She is normal weight. She is not ill-appearing, toxic-appearing or diaphoretic.   HENT:      Head: Normocephalic and atraumatic.      Right Ear: Tympanic membrane, ear canal and external ear normal. There is no impacted cerumen.      Left Ear: Tympanic membrane, ear canal and external ear normal. There is no impacted cerumen.      Nose: Nose normal. No congestion or rhinorrhea.      Mouth/Throat:      Mouth: Mucous membranes are moist.      Pharynx: Oropharynx is clear. No oropharyngeal exudate or posterior oropharyngeal erythema.   Eyes:      General: No scleral icterus.        Right eye: No discharge.         Left eye: No discharge.      Extraocular Movements: Extraocular movements intact.      Conjunctiva/sclera: Conjunctivae normal.      Pupils: Pupils are equal, round, and reactive to light.   Neck:      Vascular: No carotid bruit.   Cardiovascular:      Rate and Rhythm: Normal rate and regular rhythm.      Pulses: Normal pulses.      Heart sounds: Normal heart sounds. No murmur heard.    No friction rub. No gallop.   Pulmonary:      Effort: Pulmonary effort is normal. No respiratory distress.      Breath sounds: Normal breath sounds. No stridor. No wheezing, rhonchi or rales.   Chest:      Chest wall: No tenderness.   Abdominal:      General: Abdomen is flat. Bowel sounds are normal. There is no distension.      Palpations: Abdomen is soft. There is no mass.      Tenderness: There is no abdominal tenderness. There is no right CVA tenderness, left CVA tenderness, guarding or rebound.      Hernia: No hernia is present.   Musculoskeletal:         General: No swelling, tenderness, deformity or signs of injury. Normal range of motion.      Cervical back: Normal range of motion and neck supple. No rigidity or tenderness.      Right lower leg: No edema.      Left lower leg: No edema.   Lymphadenopathy:      Cervical: No cervical adenopathy.   Skin:     General: Skin is warm and dry.       Capillary Refill: Capillary refill takes less than 2 seconds.      Coloration: Skin is not jaundiced or pale.      Findings: No bruising, erythema, lesion or rash.   Neurological:      General: No focal deficit present.      Mental Status: She is alert and oriented to person, place, and time. Mental status is at baseline.      Cranial Nerves: No cranial nerve deficit.      Sensory: No sensory deficit.      Motor: No weakness.      Coordination: Coordination normal.      Gait: Gait normal.      Deep Tendon Reflexes: Reflexes normal.   Psychiatric:         Mood and Affect: Mood normal.         Behavior: Behavior normal.         Thought Content: Thought content normal.         Judgment: Judgment normal.         Procedures           ED Course  ED Course as of 05/08/22 1623   Sun May 08, 2022   1530   1.  Interval removal of left-sided double-J ureteral stent from prior study of 4/4/2022.     2.  There are multiple punctate calcifications demonstrated in the proximal left ureter. There is a moderate left-sided hydronephrosis.    [BH]      ED Course User Index  [] Dayron Loco PA-C                                                 [facility]    Final diagnoses:   Acute lower UTI   Ureterolithiasis   Renal colic on left side   Nausea and vomiting, unspecified vomiting type       ED Disposition  ED Disposition     ED Disposition   Discharge    Condition   Stable    Comment   --             Eddie Latif, APRN  [address] [id number] [phone]    Call in 1 day           Medication List      New Prescriptions    cephalexin 500 MG capsule  Commonly known as: KEFLEX  Take 1 capsule by mouth 2 (Two) Times a Day.     ondansetron ODT 4 MG disintegrating tablet  Commonly known as: ZOFRAN-ODT  Place 1 tablet on the tongue Every 6 (Six) Hours As Needed for Nausea or Vomiting.     tamsulosin 0.4 MG capsule 24 hr capsule  Commonly known as: FLOMAX  Take 1 capsule by mouth Daily.        Changed    *  oxyCODONE-acetaminophen  MG per tablet  Commonly known as: Percocet  Take 1 tablet by mouth Every 6 (Six) Hours As Needed for Moderate Pain .  What changed: Another medication with the same name was added. Make sure you understand how and when to take each.     * oxyCODONE-acetaminophen  MG per tablet  Commonly known as: Percocet  Take 1 tablet by mouth Every 4 (Four) Hours As Needed for Moderate Pain.  What changed: Another medication with the same name was added. Make sure you understand how and when to take each.     * oxyCODONE-acetaminophen 7.5-325 MG per tablet  Commonly known as: PERCOCET  Take 1 tablet by mouth Every 4 (Four) Hours As Needed for Severe Pain .  What changed: You were already taking a medication with the same name, and this prescription was added. Make sure you understand how and when to take each.         * This list has 3 medication(s) that are the same as other medications prescribed for you. Read the directions carefully, and ask your doctor or other care provider to review them with you.               Where to Get Your Medications      These medications were sent to [facility] [place], [place] - [address] [place] [address] - [phone]  - [phone]   [address] [place] [address]    Phone: [phone]   · cephalexin 500 MG capsule  · ondansetron ODT 4 MG disintegrating tablet  · oxyCODONE-acetaminophen 7.5-325 MG per tablet  · tamsulosin 0.4 MG capsule 24 hr capsule          Dayron Loco PA-C  05/08/22 5030

## 2022-05-09 LAB — BACTERIA SPEC AEROBE CULT: NO GROWTH

## 2022-05-12 ENCOUNTER — HOSPITAL ENCOUNTER (EMERGENCY)
Facility: HOSPITAL | Age: 37
Discharge: HOME | End: 2022-05-12
Attending: STUDENT IN AN ORGANIZED HEALTH CARE EDUCATION/TRAINING PROGRAM | Admitting: EMERGENCY MEDICINE

## 2022-05-12 ENCOUNTER — APPOINTMENT (OUTPATIENT)
Dept: CT IMAGING | Facility: HOSPITAL | Age: 37
End: 2022-05-12

## 2022-05-12 VITALS
RESPIRATION RATE: 16 BRPM | SYSTOLIC BLOOD PRESSURE: 158 MMHG | TEMPERATURE: 98.2 F | HEIGHT: 64 IN | BODY MASS INDEX: 40.97 KG/M2 | OXYGEN SATURATION: 94 % | DIASTOLIC BLOOD PRESSURE: 107 MMHG | WEIGHT: 240 LBS | HEART RATE: 111 BPM

## 2022-05-12 DIAGNOSIS — N39.0 BACTERIAL UTI: ICD-10-CM

## 2022-05-12 DIAGNOSIS — A49.9 BACTERIAL UTI: ICD-10-CM

## 2022-05-12 DIAGNOSIS — N20.0 KIDNEY STONES: Primary | ICD-10-CM

## 2022-05-12 LAB
ALBUMIN SERPL-MCNC: 4.34 G/DL (ref 3.5–5.2)
ALBUMIN/GLOB SERPL: 1.2 G/DL
ALP SERPL-CCNC: 120 U/L (ref 39–117)
ALT SERPL W P-5'-P-CCNC: 21 U/L (ref 1–33)
ANION GAP SERPL CALCULATED.3IONS-SCNC: 14.2 MMOL/L (ref 5–15)
AST SERPL-CCNC: 19 U/L (ref 1–32)
BACTERIA UR QL AUTO: ABNORMAL /HPF
BASOPHILS # BLD MANUAL: 0.06 10*3/MM3 (ref 0–0.2)
BASOPHILS NFR BLD MANUAL: 1 % (ref 0–1.5)
BILIRUB SERPL-MCNC: 0.4 MG/DL (ref 0–1.2)
BILIRUB UR QL STRIP: NEGATIVE
BUN SERPL-MCNC: 23 MG/DL (ref 6–20)
BUN/CREAT SERPL: 30.7 (ref 7–25)
CALCIUM SPEC-SCNC: 9.9 MG/DL (ref 8.6–10.5)
CHLORIDE SERPL-SCNC: 100 MMOL/L (ref 98–107)
CLARITY UR: CLEAR
CO2 SERPL-SCNC: 21.8 MMOL/L (ref 22–29)
COLOR UR: YELLOW
CREAT SERPL-MCNC: 0.75 MG/DL (ref 0.57–1)
D-LACTATE SERPL-SCNC: 3.1 MMOL/L (ref 0.5–2)
D-LACTATE SERPL-SCNC: 3.1 MMOL/L (ref 0.5–2)
DEPRECATED RDW RBC AUTO: 44.5 FL (ref 37–54)
EGFRCR SERPLBLD CKD-EPI 2021: 106 ML/MIN/1.73
EOSINOPHIL # BLD MANUAL: 0.06 10*3/MM3 (ref 0–0.4)
EOSINOPHIL NFR BLD MANUAL: 1 % (ref 0.3–6.2)
ERYTHROCYTE [DISTWIDTH] IN BLOOD BY AUTOMATED COUNT: 13.5 % (ref 12.3–15.4)
GLOBULIN UR ELPH-MCNC: 3.7 GM/DL
GLUCOSE SERPL-MCNC: 201 MG/DL (ref 65–99)
GLUCOSE UR STRIP-MCNC: ABNORMAL MG/DL
HCG SERPL QL: NEGATIVE
HCT VFR BLD AUTO: 35.5 % (ref 34–46.6)
HGB BLD-MCNC: 12.5 G/DL (ref 12–15.9)
HGB UR QL STRIP.AUTO: ABNORMAL
HOLD SPECIMEN: NORMAL
HYALINE CASTS UR QL AUTO: ABNORMAL /LPF
KETONES UR QL STRIP: NEGATIVE
LEUKOCYTE ESTERASE UR QL STRIP.AUTO: ABNORMAL
LIPASE SERPL-CCNC: 29 U/L (ref 13–60)
LYMPHOCYTES # BLD MANUAL: 1.43 10*3/MM3 (ref 0.7–3.1)
LYMPHOCYTES NFR BLD MANUAL: 12 % (ref 5–12)
MCH RBC QN AUTO: 32 PG (ref 26.6–33)
MCHC RBC AUTO-ENTMCNC: 35.2 G/DL (ref 31.5–35.7)
MCV RBC AUTO: 90.8 FL (ref 79–97)
MONOCYTES # BLD: 0.66 10*3/MM3 (ref 0.1–0.9)
NEUTROPHILS # BLD AUTO: 3.3 10*3/MM3 (ref 1.7–7)
NEUTROPHILS NFR BLD MANUAL: 60 % (ref 42.7–76)
NITRITE UR QL STRIP: NEGATIVE
PH UR STRIP.AUTO: 5.5 [PH] (ref 5–8)
PLATELET # BLD AUTO: 237 10*3/MM3 (ref 140–450)
PMV BLD AUTO: 8.7 FL (ref 6–12)
POTASSIUM SERPL-SCNC: 4.7 MMOL/L (ref 3.5–5.2)
PROT SERPL-MCNC: 8 G/DL (ref 6–8.5)
PROT UR QL STRIP: ABNORMAL
RBC # BLD AUTO: 3.91 10*6/MM3 (ref 3.77–5.28)
RBC # UR STRIP: ABNORMAL /HPF
RBC MORPH BLD: NORMAL
REF LAB TEST METHOD: ABNORMAL
SCAN SLIDE: NORMAL
SMALL PLATELETS BLD QL SMEAR: ADEQUATE
SODIUM SERPL-SCNC: 136 MMOL/L (ref 136–145)
SP GR UR STRIP: 1.02 (ref 1–1.03)
SQUAMOUS #/AREA URNS HPF: ABNORMAL /HPF
UROBILINOGEN UR QL STRIP: ABNORMAL
VARIANT LYMPHS NFR BLD MANUAL: 26 % (ref 19.6–45.3)
WBC # UR STRIP: ABNORMAL /HPF
WBC NRBC COR # BLD: 5.5 10*3/MM3 (ref 3.4–10.8)
WHOLE BLOOD HOLD COAG: NORMAL
WHOLE BLOOD HOLD SPECIMEN: NORMAL
YEAST URNS QL MICRO: ABNORMAL /HPF

## 2022-05-12 PROCEDURE — 84703 CHORIONIC GONADOTROPIN ASSAY: CPT | Performed by: STUDENT IN AN ORGANIZED HEALTH CARE EDUCATION/TRAINING PROGRAM

## 2022-05-12 PROCEDURE — 74176 CT ABD & PELVIS W/O CONTRAST: CPT

## 2022-05-12 PROCEDURE — 83690 ASSAY OF LIPASE: CPT | Performed by: STUDENT IN AN ORGANIZED HEALTH CARE EDUCATION/TRAINING PROGRAM

## 2022-05-12 PROCEDURE — 85025 COMPLETE CBC W/AUTO DIFF WBC: CPT | Performed by: STUDENT IN AN ORGANIZED HEALTH CARE EDUCATION/TRAINING PROGRAM

## 2022-05-12 PROCEDURE — 36415 COLL VENOUS BLD VENIPUNCTURE: CPT

## 2022-05-12 PROCEDURE — 25010000002 PROCHLORPERAZINE 10 MG/2ML SOLUTION: Performed by: NURSE PRACTITIONER

## 2022-05-12 PROCEDURE — 25010000002 CEFTRIAXONE PER 250 MG: Performed by: EMERGENCY MEDICINE

## 2022-05-12 PROCEDURE — 81001 URINALYSIS AUTO W/SCOPE: CPT | Performed by: EMERGENCY MEDICINE

## 2022-05-12 PROCEDURE — 85007 BL SMEAR W/DIFF WBC COUNT: CPT | Performed by: STUDENT IN AN ORGANIZED HEALTH CARE EDUCATION/TRAINING PROGRAM

## 2022-05-12 PROCEDURE — 96365 THER/PROPH/DIAG IV INF INIT: CPT

## 2022-05-12 PROCEDURE — 83605 ASSAY OF LACTIC ACID: CPT | Performed by: STUDENT IN AN ORGANIZED HEALTH CARE EDUCATION/TRAINING PROGRAM

## 2022-05-12 PROCEDURE — 80053 COMPREHEN METABOLIC PANEL: CPT | Performed by: STUDENT IN AN ORGANIZED HEALTH CARE EDUCATION/TRAINING PROGRAM

## 2022-05-12 PROCEDURE — 96375 TX/PRO/DX INJ NEW DRUG ADDON: CPT

## 2022-05-12 PROCEDURE — 25010000002 HYDROMORPHONE 1 MG/ML SOLUTION: Performed by: EMERGENCY MEDICINE

## 2022-05-12 PROCEDURE — 99283 EMERGENCY DEPT VISIT LOW MDM: CPT

## 2022-05-12 RX ORDER — SODIUM CHLORIDE 0.9 % (FLUSH) 0.9 %
10 SYRINGE (ML) INJECTION AS NEEDED
Status: DISCONTINUED | OUTPATIENT
Start: 2022-05-12 | End: 2022-05-13 | Stop reason: HOSPADM

## 2022-05-12 RX ORDER — NITROFURANTOIN 25; 75 MG/1; MG/1
100 CAPSULE ORAL 2 TIMES DAILY
Qty: 20 CAPSULE | Refills: 0 | Status: SHIPPED | OUTPATIENT
Start: 2022-05-12 | End: 2022-05-22

## 2022-05-12 RX ORDER — PROCHLORPERAZINE EDISYLATE 5 MG/ML
10 INJECTION INTRAMUSCULAR; INTRAVENOUS ONCE
Status: COMPLETED | OUTPATIENT
Start: 2022-05-12 | End: 2022-05-12

## 2022-05-12 RX ADMIN — HYDROMORPHONE HYDROCHLORIDE 1 MG: 1 INJECTION, SOLUTION INTRAMUSCULAR; INTRAVENOUS; SUBCUTANEOUS at 20:29

## 2022-05-12 RX ADMIN — PROCHLORPERAZINE EDISYLATE 10 MG: 5 INJECTION INTRAMUSCULAR; INTRAVENOUS at 20:07

## 2022-05-12 RX ADMIN — CEFTRIAXONE 1 G: 1 INJECTION, POWDER, FOR SOLUTION INTRAMUSCULAR; INTRAVENOUS at 21:40

## 2022-05-12 RX ADMIN — SODIUM CHLORIDE 1000 ML: 9 INJECTION, SOLUTION INTRAVENOUS at 18:52

## 2022-05-12 NOTE — ED NOTES
MEDICAL SCREENING:    Reason for Visit: Abdominal Pain    Patient initially seen in triage.  The patient was advised further evaluation and diagnostic testing will be needed, some of the treatment and testing will be initiated in the lobby in order to begin the process.  The patient will be returned to the waiting area for the time being and possibly be re-assessed by a subsequent ED provider.  The patient will be brought back to the treatment area in as timely manner as possible.         David Mccoy, DO  05/12/22 2772

## 2022-05-12 NOTE — ED PROVIDER NOTES
Subjective   Patient is a 37-year-old female with significant past medical history positive for GERD, diabetes, hypertension, hypothyroidism, hyperlipidemia, depression, asthma, anxiety, kidney stones, presenting to the ER complaints of left flank pain.  Patient states she has had this left-sided flank pain for 3 days and worsening.  Patient rates her pain 10 out of 10 on a verbal rating scale described as sharp and constant.  Patient also reports difficulty urinating and dysuria.  Patient denies any fever, cough, chest pain, shortness of breath or any additional symptoms today.      History provided by:  Patient   used: No        Review of Systems   Constitutional: Negative.  Negative for fever.   HENT: Negative.    Respiratory: Negative.    Cardiovascular: Negative.  Negative for chest pain.   Gastrointestinal: Negative.  Negative for abdominal pain.   Endocrine: Negative.    Genitourinary: Positive for difficulty urinating, flank pain and urgency. Negative for dysuria.   Skin: Negative.    Neurological: Negative.    Psychiatric/Behavioral: Negative.    All other systems reviewed and are negative.      Past Medical History:   Diagnosis Date   • A-fib (HCC)    • Abnormal ECG    • Anemia    • Anxiety    • Asthma    • Cancer (HCC)     Ovarian   • Depression    • Diabetes mellitus (HCC)    • DVT (deep venous thrombosis) (HCC)    • Factor 5 Leiden mutation, heterozygous (HCC)    • Fibroid    • GERD (gastroesophageal reflux disease)    • Gout    • H/O abdominal abscess    • History of sepsis    • History of transfusion    • Hyperlipidemia    • Hypothyroid    • Kidney stone    • Migraines    • Neuropathy    • Ovarian cancer (HCC) 02/2021   • Ovarian cyst    • PE (pulmonary embolism)    • Polycystic ovary syndrome    • Preeclampsia    • Rh incompatibility    • Stroke (HCC)    • TIA (transient ischemic attack)    • Urinary tract infection    • Varicella        Allergies   Allergen Reactions   • Toradol  [Ketorolac Tromethamine] Anaphylaxis and Hives   • Haldol [Haloperidol] Hives and Mental Status Change   • Tramadol Hives and Swelling   • Amoxicillin Hives and Rash   • Penicillins Hives and Rash       Past Surgical History:   Procedure Laterality Date   • ABDOMINAL SURGERY     • CARDIAC CATHETERIZATION     •  SECTION     • CHOLECYSTECTOMY     • COLONOSCOPY     • ENDOSCOPY     • EXTRACORPOREAL SHOCK WAVE LITHOTRIPSY (ESWL) Left 10/22/2021    Procedure: EXTRACORPOREAL SHOCKWAVE LITHOTRIPSY;  Surgeon: Milan Motley MD;  Location: [facility];  Service: Urology;  Laterality: Left;   • LITHOTRIPSY     • RIGHT OOPHORECTOMY     • URETEROSCOPY LASER LITHOTRIPSY WITH STENT INSERTION Left 10/01/2021    Procedure: URETEROSCOPY WITH STENT PLACEMENT;  Surgeon: Milan Motley MD;  Location: [facility];  Service: Urology;  Laterality: Left;   • URETEROSCOPY LASER LITHOTRIPSY WITH STENT INSERTION Left 2022    Procedure: URETEROSCOPY STENT REMOVAL;  Surgeon: Milan Motley MD;  Location: [facility];  Service: Urology;  Laterality: Left;       Family History   Problem Relation Age of Onset   • Hypertension Mother    • Diabetes Father    • Hypertension Father    • Heart attack Father    • No Known Problems Sister    • No Known Problems Brother    • No Known Problems Son    • No Known Problems Daughter    • No Known Problems Maternal Grandmother    • No Known Problems Maternal Grandfather    • No Known Problems Paternal Grandmother    • No Known Problems Paternal Grandfather    • No Known Problems Cousin    • Rheum arthritis Neg Hx    • Osteoarthritis Neg Hx    • Asthma Neg Hx    • Heart failure Neg Hx    • Hyperlipidemia Neg Hx    • Migraines Neg Hx    • Rashes / Skin problems Neg Hx    • Seizures Neg Hx    • Stroke Neg Hx    • Thyroid disease Neg Hx        Social History     Socioeconomic History   • Marital status:    Tobacco Use   • Smoking status: Never Smoker   • Smokeless tobacco:  "Never Used   Vaping Use   • Vaping Use: Never used   Substance and Sexual Activity   • Alcohol use: No   • Drug use: Never     Comment: Pt has track marks on right arm. Pt states \"It's from my INR being drawn.\"    • Sexual activity: Not Currently     Partners: Male     Birth control/protection: None           Objective   Physical Exam  Vitals and nursing note reviewed.   Constitutional:       General: She is not in acute distress.     Appearance: She is well-developed. She is obese. She is toxic-appearing. She is not ill-appearing or diaphoretic.   HENT:      Head: Normocephalic and atraumatic.      Right Ear: External ear normal.      Left Ear: External ear normal.      Nose: Nose normal.      Mouth/Throat:      Mouth: Mucous membranes are moist.      Pharynx: Oropharynx is clear.   Eyes:      Conjunctiva/sclera: Conjunctivae normal.      Pupils: Pupils are equal, round, and reactive to light.   Neck:      Vascular: No JVD.      Trachea: No tracheal deviation.   Cardiovascular:      Rate and Rhythm: Regular rhythm. Tachycardia present.      Heart sounds: Normal heart sounds. No murmur heard.  Pulmonary:      Effort: Pulmonary effort is normal. No respiratory distress.      Breath sounds: Normal breath sounds. No wheezing.   Abdominal:      General: Bowel sounds are normal.      Palpations: Abdomen is soft.      Tenderness: There is abdominal tenderness.   Musculoskeletal:         General: No deformity. Normal range of motion.      Cervical back: Normal range of motion and neck supple.   Skin:     General: Skin is warm and dry.      Capillary Refill: Capillary refill takes less than 2 seconds.      Coloration: Skin is not pale.      Findings: No erythema or rash.   Neurological:      General: No focal deficit present.      Mental Status: She is alert and oriented to person, place, and time. Mental status is at baseline.      Cranial Nerves: No cranial nerve deficit.   Psychiatric:         Mood and Affect: Mood " normal.         Behavior: Behavior normal.         Thought Content: Thought content normal.         Judgment: Judgment normal.         Procedures           ED Course  ED Course as of 05/13/22 2246   u May 12, 2022   2217 CT Abdomen Pelvis Stone Protocol  IMPRESSION:     1. Moderate left-sided hydronephrosis and proximal hydroureter secondary to multiple obstructing stones in the left renal pelvis and proximal left ureter. Similar appearance to prior.  2. Punctate nonobstructing right renal stones.  3. Normal appendix.  4. Hepatosplenomegaly. [ES]      ED Course User Index  [ES] Martir Razo MD                                                 MDM  Number of Diagnoses or Management Options  Bacterial UTI: new and requires workup  Kidney stones: established and worsening     Amount and/or Complexity of Data Reviewed  Clinical lab tests: reviewed and ordered  Tests in the radiology section of CPT®: reviewed and ordered  Tests in the medicine section of CPT®: ordered and reviewed  Review and summarize past medical records: yes  Discuss the patient with other providers: yes  Independent visualization of images, tracings, or specimens: yes    Risk of Complications, Morbidity, and/or Mortality  Presenting problems: moderate  Diagnostic procedures: moderate  Management options: moderate    Patient Progress  Patient progress: stable      Final diagnoses:   Kidney stones   Bacterial UTI       ED Disposition  ED Disposition     ED Disposition   Discharge    Condition   Stable    Comment   --             Milan Motley MD  [address] [id number] [phone]    Schedule an appointment as soon as possible for a visit in 1 day  EVALUATE         Medication List      New Prescriptions    nitrofurantoin (macrocrystal-monohydrate) 100 MG capsule  Commonly known as: MACROBID  Take 1 capsule by mouth 2 (Two) Times a Day for 10 days.           Where to Get Your Medications      These medications  were sent to [place], [place] - [address] [place] [address] - [phone]  - [phone]   [address] [place] [id number]    Phone: [phone]   · nitrofurantoin (macrocrystal-monohydrate) 100 MG capsule          Martir Razo MD  05/13/22 3446

## 2022-05-12 NOTE — ED NOTES
Patient is attempting to provide urine sample with her bedside commode. Patient said she did not need assistance.

## 2022-05-18 ENCOUNTER — TELEPHONE (OUTPATIENT)
Dept: ONCOLOGY | Facility: CLINIC | Age: 37
End: 2022-05-18

## 2022-05-18 NOTE — TELEPHONE ENCOUNTER
Provider: DR MUNOZ  Caller: MAGO      Reason for Call: MAGO MISSED HER APPT IN April. SHE IS WANTING TO GET THAT APPT R/S    PLEASE ADVISE

## 2022-05-23 DIAGNOSIS — E11.40 TYPE 2 DIABETES MELLITUS WITH DIABETIC NEUROPATHY, WITH LONG-TERM CURRENT USE OF INSULIN: ICD-10-CM

## 2022-05-23 DIAGNOSIS — Z79.4 TYPE 2 DIABETES MELLITUS WITH DIABETIC NEUROPATHY, WITH LONG-TERM CURRENT USE OF INSULIN: Chronic | ICD-10-CM

## 2022-05-23 DIAGNOSIS — I10 PRIMARY HYPERTENSION: ICD-10-CM

## 2022-05-23 DIAGNOSIS — M1A.9XX0 CHRONIC GOUT WITHOUT TOPHUS, UNSPECIFIED CAUSE, UNSPECIFIED SITE: Chronic | ICD-10-CM

## 2022-05-23 DIAGNOSIS — F33.0 MAJOR DEPRESSIVE DISORDER, RECURRENT EPISODE, MILD DEGREE: Chronic | ICD-10-CM

## 2022-05-23 DIAGNOSIS — E11.40 TYPE 2 DIABETES MELLITUS WITH DIABETIC NEUROPATHY, WITH LONG-TERM CURRENT USE OF INSULIN: Chronic | ICD-10-CM

## 2022-05-23 DIAGNOSIS — Z79.4 TYPE 2 DIABETES MELLITUS WITH DIABETIC NEUROPATHY, WITH LONG-TERM CURRENT USE OF INSULIN: ICD-10-CM

## 2022-05-23 DIAGNOSIS — J45.909 MODERATE ASTHMA, UNSPECIFIED WHETHER COMPLICATED, UNSPECIFIED WHETHER PERSISTENT: Chronic | ICD-10-CM

## 2022-05-23 RX ORDER — GABAPENTIN 300 MG/1
300 CAPSULE ORAL 2 TIMES DAILY
Qty: 60 CAPSULE | Refills: 0 | Status: SHIPPED | OUTPATIENT
Start: 2022-05-23 | End: 2022-06-24 | Stop reason: SDUPTHER

## 2022-05-23 RX ORDER — ALLOPURINOL 100 MG/1
100 TABLET ORAL DAILY
Qty: 30 TABLET | Refills: 0 | Status: SHIPPED | OUTPATIENT
Start: 2022-05-23 | End: 2022-06-22 | Stop reason: SDUPTHER

## 2022-05-23 RX ORDER — METOPROLOL SUCCINATE 25 MG/1
25 TABLET, EXTENDED RELEASE ORAL NIGHTLY
Qty: 30 TABLET | Refills: 0 | Status: SHIPPED | OUTPATIENT
Start: 2022-05-23 | End: 2022-06-17 | Stop reason: SDUPTHER

## 2022-05-23 RX ORDER — DULOXETIN HYDROCHLORIDE 20 MG/1
20 CAPSULE, DELAYED RELEASE ORAL DAILY
Qty: 30 CAPSULE | Refills: 2 | Status: SHIPPED | OUTPATIENT
Start: 2022-05-23 | End: 2022-08-24 | Stop reason: SDUPTHER

## 2022-05-23 RX ORDER — MONTELUKAST SODIUM 10 MG/1
10 TABLET ORAL NIGHTLY
Qty: 30 TABLET | Refills: 2 | Status: SHIPPED | OUTPATIENT
Start: 2022-05-23 | End: 2022-07-21 | Stop reason: SDUPTHER

## 2022-05-24 ENCOUNTER — OFFICE VISIT (OUTPATIENT)
Dept: UROLOGY | Facility: CLINIC | Age: 37
End: 2022-05-24

## 2022-05-24 ENCOUNTER — HOSPITAL ENCOUNTER (OUTPATIENT)
Dept: GENERAL RADIOLOGY | Facility: HOSPITAL | Age: 37
Discharge: HOME | End: 2022-05-24
Admitting: NURSE PRACTITIONER

## 2022-05-24 VITALS — WEIGHT: 240 LBS | BODY MASS INDEX: 40.97 KG/M2 | HEIGHT: 64 IN

## 2022-05-24 DIAGNOSIS — R10.9 LEFT FLANK PAIN: Primary | ICD-10-CM

## 2022-05-24 DIAGNOSIS — N20.1 URETERAL CALCULUS: ICD-10-CM

## 2022-05-24 DIAGNOSIS — N20.0 KIDNEY STONE: ICD-10-CM

## 2022-05-24 DIAGNOSIS — N20.0 LEFT NEPHROLITHIASIS: ICD-10-CM

## 2022-05-24 DIAGNOSIS — K59.04 CHRONIC IDIOPATHIC CONSTIPATION: ICD-10-CM

## 2022-05-24 LAB
BILIRUB BLD-MCNC: NEGATIVE MG/DL
CLARITY, POC: ABNORMAL
COLOR UR: YELLOW
EXPIRATION DATE: ABNORMAL
GLUCOSE UR STRIP-MCNC: NEGATIVE MG/DL
KETONES UR QL: NEGATIVE
LEUKOCYTE EST, POC: ABNORMAL
Lab: ABNORMAL
NITRITE UR-MCNC: NEGATIVE MG/ML
PH UR: 6 [PH] (ref 5–8)
PROT UR STRIP-MCNC: ABNORMAL MG/DL
RBC # UR STRIP: ABNORMAL /UL
SP GR UR: 1.02 (ref 1–1.03)
UROBILINOGEN UR QL: NORMAL

## 2022-05-24 PROCEDURE — 74018 RADEX ABDOMEN 1 VIEW: CPT | Performed by: RADIOLOGY

## 2022-05-24 PROCEDURE — 99214 OFFICE O/P EST MOD 30 MIN: CPT | Performed by: NURSE PRACTITIONER

## 2022-05-24 PROCEDURE — 87086 URINE CULTURE/COLONY COUNT: CPT | Performed by: NURSE PRACTITIONER

## 2022-05-24 PROCEDURE — 74018 RADEX ABDOMEN 1 VIEW: CPT

## 2022-05-24 RX ORDER — POLYETHYLENE GLYCOL 3350 17 G/17G
17 POWDER, FOR SOLUTION ORAL DAILY
Qty: 30 EACH | Refills: 3 | Status: SHIPPED | OUTPATIENT
Start: 2022-05-24

## 2022-05-24 RX ORDER — OXYCODONE AND ACETAMINOPHEN 10; 325 MG/1; MG/1
1 TABLET ORAL EVERY 4 HOURS PRN
Qty: 12 TABLET | Refills: 0 | Status: SHIPPED | OUTPATIENT
Start: 2022-05-24 | End: 2022-07-23

## 2022-05-24 RX ORDER — SENNA PLUS 8.6 MG/1
2 TABLET ORAL DAILY
Qty: 60 TABLET | Refills: 3 | Status: SHIPPED | OUTPATIENT
Start: 2022-05-24 | End: 2022-12-01

## 2022-05-25 DIAGNOSIS — Z79.4 TYPE 2 DIABETES MELLITUS WITH DIABETIC NEUROPATHY, WITH LONG-TERM CURRENT USE OF INSULIN: Primary | ICD-10-CM

## 2022-05-25 DIAGNOSIS — E11.40 TYPE 2 DIABETES MELLITUS WITH DIABETIC NEUROPATHY, WITH LONG-TERM CURRENT USE OF INSULIN: Primary | ICD-10-CM

## 2022-05-25 LAB — BACTERIA SPEC AEROBE CULT: NORMAL

## 2022-05-26 ENCOUNTER — OFFICE VISIT (OUTPATIENT)
Dept: ONCOLOGY | Facility: CLINIC | Age: 37
End: 2022-05-26

## 2022-05-26 VITALS
HEIGHT: 64 IN | WEIGHT: 240 LBS | HEART RATE: 102 BPM | BODY MASS INDEX: 40.97 KG/M2 | OXYGEN SATURATION: 95 % | RESPIRATION RATE: 18 BRPM | TEMPERATURE: 98.6 F | DIASTOLIC BLOOD PRESSURE: 105 MMHG | SYSTOLIC BLOOD PRESSURE: 157 MMHG

## 2022-05-26 DIAGNOSIS — D68.51 FACTOR V LEIDEN: Primary | ICD-10-CM

## 2022-05-26 DIAGNOSIS — I95.89 OTHER SPECIFIED HYPOTENSION: ICD-10-CM

## 2022-05-26 LAB
DEPRECATED RDW RBC AUTO: 42.4 FL (ref 37–54)
EOSINOPHIL # BLD MANUAL: 0.14 10*3/MM3 (ref 0–0.4)
EOSINOPHIL NFR BLD MANUAL: 3 % (ref 0.3–6.2)
ERYTHROCYTE [DISTWIDTH] IN BLOOD BY AUTOMATED COUNT: 13 % (ref 12.3–15.4)
HCT VFR BLD AUTO: 32.9 % (ref 34–46.6)
HGB BLD-MCNC: 11.7 G/DL (ref 12–15.9)
LYMPHOCYTES # BLD MANUAL: 1.72 10*3/MM3 (ref 0.7–3.1)
LYMPHOCYTES NFR BLD MANUAL: 9 % (ref 5–12)
MCH RBC QN AUTO: 32.2 PG (ref 26.6–33)
MCHC RBC AUTO-ENTMCNC: 35.6 G/DL (ref 31.5–35.7)
MCV RBC AUTO: 90.6 FL (ref 79–97)
MONOCYTES # BLD: 0.42 10*3/MM3 (ref 0.1–0.9)
NEUTROPHILS # BLD AUTO: 2.37 10*3/MM3 (ref 1.7–7)
NEUTROPHILS NFR BLD MANUAL: 50 % (ref 42.7–76)
NEUTS BAND NFR BLD MANUAL: 1 % (ref 0–5)
PLAT MORPH BLD: NORMAL
PLATELET # BLD AUTO: 229 10*3/MM3 (ref 140–450)
PMV BLD AUTO: 8.4 FL (ref 6–12)
RBC # BLD AUTO: 3.63 10*6/MM3 (ref 3.77–5.28)
RBC MORPH BLD: NORMAL
VARIANT LYMPHS NFR BLD MANUAL: 37 % (ref 19.6–45.3)
WBC NRBC COR # BLD: 4.65 10*3/MM3 (ref 3.4–10.8)

## 2022-05-26 PROCEDURE — 85025 COMPLETE CBC W/AUTO DIFF WBC: CPT | Performed by: INTERNAL MEDICINE

## 2022-05-26 PROCEDURE — 85300 ANTITHROMBIN III ACTIVITY: CPT | Performed by: INTERNAL MEDICINE

## 2022-05-26 PROCEDURE — 85306 CLOT INHIBIT PROT S FREE: CPT | Performed by: INTERNAL MEDICINE

## 2022-05-26 PROCEDURE — 85007 BL SMEAR W/DIFF WBC COUNT: CPT | Performed by: INTERNAL MEDICINE

## 2022-05-26 PROCEDURE — 86147 CARDIOLIPIN ANTIBODY EA IG: CPT | Performed by: INTERNAL MEDICINE

## 2022-05-26 PROCEDURE — 85732 THROMBOPLASTIN TIME PARTIAL: CPT | Performed by: INTERNAL MEDICINE

## 2022-05-26 PROCEDURE — 86146 BETA-2 GLYCOPROTEIN ANTIBODY: CPT | Performed by: INTERNAL MEDICINE

## 2022-05-26 PROCEDURE — 85613 RUSSELL VIPER VENOM DILUTED: CPT | Performed by: INTERNAL MEDICINE

## 2022-05-26 PROCEDURE — 99214 OFFICE O/P EST MOD 30 MIN: CPT | Performed by: INTERNAL MEDICINE

## 2022-05-26 PROCEDURE — 85303 CLOT INHIBIT PROT C ACTIVITY: CPT | Performed by: INTERNAL MEDICINE

## 2022-05-26 PROCEDURE — 85301 ANTITHROMBIN III ANTIGEN: CPT | Performed by: INTERNAL MEDICINE

## 2022-05-26 NOTE — PROGRESS NOTES
Natalia Arzate  [id number]  1985 5/26/2022      Referring Provider:   YEIMI Frausto    Reason for Follow Up:   Factor V Leiden mutation  Deep venous thrombosis (DVT)    Chief Complaint:  Fatigue and weak  Right lower lateral back pain      History of Present Illness   Natalia Arzate is a very pleasant 37 y.o.  female who presents in follow up appointment for further management and evaluation of Factor V Leiden mutation.     She was previously evaluated by Dr. De Dios in 2013 when she was diagnosed with right lower extremity DVT and bilateral pulmonary embolism. As per his clinic note that was dated on 4/25/14 her workup was positive for heterozygous Factor V Leiden and MTHFR gene mutation. Her thrombosis was felt to be provoked as she was on hormone replacement therapy at the time and Coumadin was recommended for one year. This was later changed to Xarelto by her PCP. She reports being compliant with Xarelto and developing a lower extremity DVT in 2015 at which time she was changed back to Coumadin.     However she has been without Coumadin for one year as she did not follow with her primary provider as scheduled. She presented to [facility] ED after a fall at home with complaints of fevers. She was found to have left pyelonephritis complicated by multiple abscesses as well as superinfection of left iliopsoas hematoma and was transferred to the [facility] on December 26th 2021 where she had a prolonged hospitalizaton. She developed acute respiratory failure and was intubated and also required CRRT/HD. During that admission abscesses were positive for MRSA and E. Coli and she had a left ureteral stent and drain placed which has been removed. She had a repeat CT scan that showed a decrease in size of the left RP collection on 2/7/22. She had an IVC filter placed during her admission and was initially started on heparin drip due to her hematoma and later bivalirudin due to possible heparin  resistance which was held intermittently for procedures or bleeding concerns. This was transitioned to Lovenox and ultimately to Eliquis which she is tolerating well. She also developed an acute/subacute right cerebellar ischemic stroke which was noted on 1/26/22 CT scan compared to CT scan one month prior. Neurology was consulted and recommended continuing anticoagulation. Echo was without intracardiac thrombosis.      Interim History:  Since her last visit she has had multiple [facility] ED visits for ongoing flank pain and UTIs and is currently following with Dr. Motley. She reports that she is overall doing much better since her last visit. She continues to be on Eliquis and states that she is compliant with the medication exception she did hold this for two days prior to her recent filter removal.           The following portions of the patient's history were reviewed and updated as appropriate: allergies, current medications, past family history, past medical history, past social history, past surgical history and problem list.    Allergies   Allergen Reactions   • Toradol [Ketorolac Tromethamine] Anaphylaxis and Hives   • Haldol [Haloperidol] Hives and Mental Status Change   • Tramadol Hives and Swelling   • Amoxicillin Hives and Rash   • Penicillins Hives and Rash       Past Medical History:   Diagnosis Date   • A-fib (HCC)    • Abnormal ECG    • Anemia    • Anxiety    • Asthma    • Cancer (HCC)     Ovarian   • Depression    • Diabetes mellitus (HCC)    • DVT (deep venous thrombosis) ([facility])    • Factor 5 Leiden mutation, heterozygous (HCC)    • Fibroid    • GERD (gastroesophageal reflux disease)    • Gout    • H/O abdominal abscess    • History of sepsis    • History of transfusion    • Hyperlipidemia    • Hypothyroid    • Kidney stone    • Migraines    • Neuropathy    • Ovarian cancer (HCC) 02/2021   • Ovarian cyst    • PE (pulmonary embolism)    • Polycystic ovary syndrome    • Preeclampsia    • Rh  "incompatibility    • Stroke (HCC)    • TIA (transient ischemic attack)    • Urinary tract infection    • Varicella    ALVARO at [facility] secondary to ovarian torsion      Past Surgical History:   Procedure Laterality Date   • ABDOMINAL SURGERY     • CARDIAC CATHETERIZATION     •  SECTION     • CHOLECYSTECTOMY     • COLONOSCOPY     • ENDOSCOPY     • EXTRACORPOREAL SHOCK WAVE LITHOTRIPSY (ESWL) Left 10/22/2021    Procedure: EXTRACORPOREAL SHOCKWAVE LITHOTRIPSY;  Surgeon: Milan Motley MD;  Location: [facility];  Service: Urology;  Laterality: Left;   • LITHOTRIPSY     • RIGHT OOPHORECTOMY     • URETEROSCOPY LASER LITHOTRIPSY WITH STENT INSERTION Left 10/01/2021    Procedure: URETEROSCOPY WITH STENT PLACEMENT;  Surgeon: Milan Motley MD;  Location: [facility] OR;  Service: Urology;  Laterality: Left;   • URETEROSCOPY LASER LITHOTRIPSY WITH STENT INSERTION Left 2022    Procedure: URETEROSCOPY STENT REMOVAL;  Surgeon: Milan Motley MD;  Location: [facility];  Service: Urology;  Laterality: Left;           Social History     Socioeconomic History   • Marital status:    Tobacco Use   • Smoking status: Never Smoker   • Smokeless tobacco: Never Used   Vaping Use   • Vaping Use: Never used   Substance and Sexual Activity   • Alcohol use: No   • Drug use: Never     Comment: Pt has track marks on right arm. Pt states \"It's from my INR being drawn.\"    • Sexual activity: Not Currently     Partners: Male     Birth control/protection: None           Family History   Problem Relation Age of Onset   • Hypertension Mother    • Diabetes Father    • Hypertension Father    • Heart attack Father    • No Known Problems Sister    • No Known Problems Brother    • No Known Problems Son    • No Known Problems Daughter    • No Known Problems Maternal Grandmother    • No Known Problems Maternal Grandfather    • No Known Problems Paternal Grandmother    • No Known Problems Paternal Grandfather    • No Known " Problems Cousin    • Rheum arthritis Neg Hx    • Osteoarthritis Neg Hx    • Asthma Neg Hx    • Heart failure Neg Hx    • Hyperlipidemia Neg Hx    • Migraines Neg Hx    • Rashes / Skin problems Neg Hx    • Seizures Neg Hx    • Stroke Neg Hx    • Thyroid disease Neg Hx      Father - PE          Current Outpatient Medications:   •  albuterol sulfate  (90 Base) MCG/ACT inhaler, Inhale 2 puffs Every 4 (Four) Hours As Needed for Wheezing., Disp: 18 g, Rfl: 2  •  allopurinol (ZYLOPRIM) 100 MG tablet, Take 1 tablet by mouth Daily., Disp: 30 tablet, Rfl: 0  •  amLODIPine (NORVASC) 10 MG tablet, Take 1 tablet by mouth Daily., Disp: 30 tablet, Rfl: 2  •  apixaban (ELIQUIS) 5 MG tablet tablet, Take 1 tablet by mouth 2 (Two) Times a Day., Disp: 60 tablet, Rfl: 2  •  azelastine (ASTELIN) 0.1 % nasal spray, 2 sprays both nostrils twice daily as needed, Disp: 1 each, Rfl: 2  •  cephalexin (KEFLEX) 500 MG capsule, Take 1 capsule by mouth 2 (Two) Times a Day., Disp: 20 capsule, Rfl: 0  •  cyanocobalamin ([employer] Vitamin B-12) 2000 MCG tablet, Take 1 tablet by mouth Daily., Disp: 30 tablet, Rfl: 3  •  DULoxetine (CYMBALTA) 20 MG capsule, Take 1 capsule by mouth Daily., Disp: 30 capsule, Rfl: 2  •  gabapentin (NEURONTIN) 300 MG capsule, Take 1 capsule by mouth 2 (Two) Times a Day for 30 days., Disp: 60 capsule, Rfl: 0  •  glucose blood test strip, 1 each by Other route 3 (Three) Times a Day., Disp: 100 each, Rfl: 2  •  insulin glargine (LANTUS, SEMGLEE) 100 UNIT/ML injection, Inject 25 Units under the skin into the appropriate area as directed Daily., Disp: , Rfl:   •  Insulin Pen Needle 30G X 8 MM misc, 1 Device Daily., Disp: 100 each, Rfl: 0  •  Lancets Micro Thin 33G misc, 1 Device 3 (Three) Times a Day., Disp: 100 each, Rfl: 2  •  metoprolol succinate XL (TOPROL-XL) 25 MG 24 hr tablet, Take 1 tablet by mouth Every Night., Disp: 30 tablet, Rfl: 0  •  montelukast (SINGULAIR) 10 MG tablet, Take 1 tablet by mouth Every Night., Disp:  30 tablet, Rfl: 2  •  ondansetron ODT (ZOFRAN-ODT) 4 MG disintegrating tablet, Place 1 tablet on the tongue Every 6 (Six) Hours As Needed for Nausea or Vomiting., Disp: 12 tablet, Rfl: 0  •  oxyCODONE-acetaminophen (Percocet)  MG per tablet, Take 1 tablet by mouth Every 6 (Six) Hours As Needed for Moderate Pain ., Disp: 20 tablet, Rfl: 0  •  oxyCODONE-acetaminophen (Percocet)  MG per tablet, Take 1 tablet by mouth Every 4 (Four) Hours As Needed for Moderate Pain., Disp: 12 tablet, Rfl: 0  •  oxyCODONE-acetaminophen (PERCOCET) 7.5-325 MG per tablet, Take 1 tablet by mouth Every 4 (Four) Hours As Needed for Severe Pain ., Disp: 12 tablet, Rfl: 0  •  polyethylene glycol (MiraLax) 17 g packet, Take 17 g by mouth Daily., Disp: 30 each, Rfl: 3  •  promethazine (PHENERGAN) 25 MG tablet, Take 1 tablet by mouth Every 6 (Six) Hours As Needed for Nausea or Vomiting., Disp: 21 tablet, Rfl: 2  •  promethazine-dextromethorphan (PROMETHAZINE-DM) 6.25-15 MG/5ML syrup, Take 5 mL by mouth 2 (Two) Times a Day As Needed for Cough., Disp: 180 mL, Rfl: 1  •  Semaglutide, 1 MG/DOSE, 4 MG/3ML solution pen-injector, Inject 1 mg under the skin into the appropriate area as directed 1 (One) Time Per Week for 4 days., Disp: 3 mL, Rfl: 2  •  senna (Senokot) 8.6 MG tablet, Take 2 tablets by mouth Daily., Disp: 60 tablet, Rfl: 3  •  tamsulosin (FLOMAX) 0.4 MG capsule 24 hr capsule, Take 1 capsule by mouth Daily., Disp: 30 capsule, Rfl: 0  •  vitamin D (ERGOCALCIFEROL) 1.25 MG (26147 [place]) capsule capsule, Take 50,000 Units by mouth 1 (One) Time Per Week. Prior to [facility] Admission, Patient was on:  takes on saturday, Disp: , Rfl:         Review of Systems  Pertinent positives are listed as per history of present of illness, all other systems reviewed and are negative.        Physical Exam  Vital Signs: These were reviewed and listed as per patient’s electronic medical chart  Vitals:    05/26/22 0935   BP: (!) 157/105   Pulse: 102   Resp:  18   Temp: 98.6 °F (37 °C)   SpO2: 95%     General: Patient is awake, alert, and in no acute distress.  Head: Normocephalic, atraumatic  Eyes: EOMI. Conjunctivae and sclerae normal.  Ears: Ears appear intact with no abnormalities noted.   Neck: Trachea midline. No obvious JVD.  Lungs: Respirations appear to be regular, even and unlabored with no signs of respiratory distress. No audible wheezing.  Abdomen: No obvious abdominal distension.  MS: Muscle tone appears normal. No gross deformities.  Extremities: No clubbing, cyanosis or edema noted.  Skin: No visible bleeding, bruising, or rash.  Neurologic: Alert and oriented x3. No gross focal deficits.          Pain Score:  Pain Score    05/26/22 0935   PainSc: 0-No pain       PHQ-Score Total:  PHQ-9 Total Score:          Labs / Studies:                     Office Visit on 05/26/2022   Component Date Value   • WBC 05/26/2022 4.65    • RBC 05/26/2022 3.63 (A)   • Hemoglobin 05/26/2022 11.7 (A)   • Hematocrit 05/26/2022 32.9 (A)   • MCV 05/26/2022 90.6    • MCH 05/26/2022 32.2    • MCHC 05/26/2022 35.6    • RDW 05/26/2022 13.0    • RDW-SD 05/26/2022 42.4    • MPV 05/26/2022 8.4    • Platelets 05/26/2022 229    • Neutrophil % 05/26/2022 50.0    • Lymphocyte % 05/26/2022 37.0    • Monocyte % 05/26/2022 9.0    • Eosinophil % 05/26/2022 3.0    • Bands %  05/26/2022 1.0    • Neutrophils Absolute 05/26/2022 2.37    • Lymphocytes Absolute 05/26/2022 1.72    • Monocytes Absolute 05/26/2022 0.42    • Eosinophils Absolute 05/26/2022 0.14    • RBC Morphology 05/26/2022 Normal    • Platelet Morphology 05/26/2022 Normal    Office Visit on 05/24/2022   Component Date Value   • Color 05/24/2022 Yellow    • Clarity, UA 05/24/2022 Slightly Cloudy (A)   • Specific Gravity  05/24/2022 1.020    • pH, Urine 05/24/2022 6.0    • Leukocytes 05/24/2022 Large (3+) (A)   • Nitrite, UA 05/24/2022 Negative    • Protein, POC 05/24/2022 Trace (A)   • Glucose, UA 05/24/2022 Negative    • Ketones, UA  05/24/2022 Negative    • Urobilinogen, UA 05/24/2022 Normal    • Bilirubin 05/24/2022 Negative    • Blood, UA 05/24/2022 3+ (A)   • Lot Number 05/24/2022 [id number]    • Expiration Date 05/24/2022 11-24-23    • Urine Culture 05/24/2022 25,000 CFU/mL Mixed Margo Isolated    Admission on 05/12/2022, Discharged on 05/12/2022   Component Date Value   • Glucose 05/12/2022 201 (A)   • BUN 05/12/2022 23 (A)   • Creatinine 05/12/2022 0.75    • Sodium 05/12/2022 136    • Potassium 05/12/2022 4.7    • Chloride 05/12/2022 100    • CO2 05/12/2022 21.8 (A)   • Calcium 05/12/2022 9.9    • Total Protein 05/12/2022 8.0    • Albumin 05/12/2022 4.34    • ALT (SGPT) 05/12/2022 21    • AST (SGOT) 05/12/2022 19    • Alkaline Phosphatase 05/12/2022 120 (A)   • Total Bilirubin 05/12/2022 0.4    • Globulin 05/12/2022 3.7    • A/G Ratio 05/12/2022 1.2    • BUN/Creatinine Ratio 05/12/2022 30.7 (A)   • Anion Gap 05/12/2022 14.2    • eGFR 05/12/2022 106.0    • Lipase 05/12/2022 29    • Lactate 05/12/2022 3.1 (A)   • HCG Qualitative 05/12/2022 Negative    • Extra Tube 05/12/2022 Hold for add-ons.    • Extra Tube 05/12/2022 hold for add-on    • Extra Tube 05/12/2022 Hold for add-ons.    • WBC 05/12/2022 5.50    • RBC 05/12/2022 3.91    • Hemoglobin 05/12/2022 12.5    • Hematocrit 05/12/2022 35.5    • MCV 05/12/2022 90.8    • MCH 05/12/2022 32.0    • MCHC 05/12/2022 35.2    • RDW 05/12/2022 13.5    • RDW-SD 05/12/2022 44.5    • MPV 05/12/2022 8.7    • Platelets 05/12/2022 237    • Scan Slide 05/12/2022     • Neutrophil % 05/12/2022 60.0    • Lymphocyte % 05/12/2022 26.0    • Monocyte % 05/12/2022 12.0    • Eosinophil % 05/12/2022 1.0    • Basophil % 05/12/2022 1.0    • Neutrophils Absolute 05/12/2022 3.30    • Lymphocytes Absolute 05/12/2022 1.43    • Monocytes Absolute 05/12/2022 0.66    • Eosinophils Absolute 05/12/2022 0.06    • Basophils Absolute 05/12/2022 0.06    • RBC Morphology 05/12/2022 Normal    • Platelet Estimate 05/12/2022  Adequate    • Lactate 05/12/2022 3.1 (A)   • Color, UA 05/12/2022 Yellow    • Appearance, UA 05/12/2022 Clear    • pH, UA 05/12/2022 5.5    • Specific Gravity, UA 05/12/2022 1.020    • Glucose, UA 05/12/2022 250 mg/dL (1+) (A)   • Ketones, UA 05/12/2022 Negative    • Bilirubin, UA 05/12/2022 Negative    • Blood, UA 05/12/2022 Moderate (2+) (A)   • Protein, UA 05/12/2022 30 mg/dL (1+) (A)   • Leuk Esterase, UA 05/12/2022 Small (1+) (A)   • Nitrite, UA 05/12/2022 Negative    • Urobilinogen, UA 05/12/2022 0.2 E.U./dL    • RBC, UA 05/12/2022 3-5 (A)   • WBC, UA 05/12/2022 31-50 (A)   • Bacteria, UA 05/12/2022 Trace (A)   • Squamous Epithelial Cell* 05/12/2022 0-2    • Yeast, UA 05/12/2022 Small/1+ Budding Yeast    • Hyaline Casts, UA 05/12/2022 None Seen    • Methodology 05/12/2022 Manual Light Microscopy    Admission on 05/08/2022, Discharged on 05/08/2022   Component Date Value   • Color, UA 05/08/2022 Yellow    • Appearance, UA 05/08/2022 Clear    • pH, UA 05/08/2022 <=5.0    • Specific Gravity, UA 05/08/2022 1.019    • Glucose, UA 05/08/2022 100 mg/dL (Trace) (A)   • Ketones, UA 05/08/2022 Negative    • Bilirubin, UA 05/08/2022 Negative    • Blood, UA 05/08/2022 Small (1+) (A)   • Protein, UA 05/08/2022 30 mg/dL (1+) (A)   • Leuk Esterase, UA 05/08/2022 Small (1+) (A)   • Nitrite, UA 05/08/2022 Negative    • Urobilinogen, UA 05/08/2022 0.2 E.U./dL    • Extra Tube 05/08/2022 Hold for add-ons.    • Extra Tube 05/08/2022 hold for add-on    • Extra Tube 05/08/2022 Hold for add-ons.    • Extra Tube 05/08/2022 hold for add-on    • Glucose 05/08/2022 208 (A)   • BUN 05/08/2022 19    • Creatinine 05/08/2022 0.70    • Sodium 05/08/2022 133 (A)   • Potassium 05/08/2022 4.2    • Chloride 05/08/2022 97 (A)   • CO2 05/08/2022 19.4 (A)   • Calcium 05/08/2022 9.7    • Total Protein 05/08/2022 7.7    • Albumin 05/08/2022 4.31    • ALT (SGPT) 05/08/2022 18    • AST (SGOT) 05/08/2022 18    • Alkaline Phosphatase 05/08/2022 118 (A)    • Total Bilirubin 05/08/2022 0.4    • Globulin 05/08/2022 3.4    • A/G Ratio 05/08/2022 1.3    • BUN/Creatinine Ratio 05/08/2022 27.1 (A)   • Anion Gap 05/08/2022 16.6 (A)   • eGFR 05/08/2022 115.1    • Lipase 05/08/2022 28    • WBC 05/08/2022 5.50    • RBC 05/08/2022 3.92    • Hemoglobin 05/08/2022 12.6    • Hematocrit 05/08/2022 35.9    • MCV 05/08/2022 91.6    • MCH 05/08/2022 32.1    • MCHC 05/08/2022 35.1    • RDW 05/08/2022 13.9    • RDW-SD 05/08/2022 46.3    • MPV 05/08/2022 8.6    • Platelets 05/08/2022 221    • Neutrophil % 05/08/2022 56.4    • Lymphocyte % 05/08/2022 32.2    • Monocyte % 05/08/2022 8.9    • Eosinophil % 05/08/2022 1.6    • Basophil % 05/08/2022 0.4    • Immature Grans % 05/08/2022 0.5    • Neutrophils, Absolute 05/08/2022 3.10    • Lymphocytes, Absolute 05/08/2022 1.77    • Monocytes, Absolute 05/08/2022 0.49    • Eosinophils, Absolute 05/08/2022 0.09    • Basophils, Absolute 05/08/2022 0.02    • Immature Grans, Absolute 05/08/2022 0.03    • nRBC 05/08/2022 0.0    • RBC, UA 05/08/2022 6-12 (A)   • WBC, UA 05/08/2022 31-50 (A)   • Bacteria, UA 05/08/2022 None Seen    • Squamous Epithelial Cell* 05/08/2022 3-6 (A)   • Hyaline Casts, UA 05/08/2022 None Seen    • Methodology 05/08/2022 Automated Microscopy    • Urine Culture 05/08/2022 No growth    Office Visit on 04/28/2022   Component Date Value   • Glucose 04/28/2022 162 (A)   • BUN 04/28/2022 20    • Creatinine 04/28/2022 0.74    • Sodium 04/28/2022 136    • Potassium 04/28/2022 4.4    • Chloride 04/28/2022 100    • CO2 04/28/2022 21.4 (A)   • Calcium 04/28/2022 9.6    • Total Protein 04/28/2022 7.5    • Albumin 04/28/2022 3.90    • ALT (SGPT) 04/28/2022 19    • AST (SGOT) 04/28/2022 17    • Alkaline Phosphatase 04/28/2022 107    • Total Bilirubin 04/28/2022 0.5    • Globulin 04/28/2022 3.6    • A/G Ratio 04/28/2022 1.1    • BUN/Creatinine Ratio 04/28/2022 27.0 (A)   • Anion Gap 04/28/2022 14.6    • eGFR 04/28/2022 107.7    • 25 Hydroxy,  Vitamin D 04/28/2022 12.4 (A)   • Vitamin B-12 04/28/2022 159 (A)   • Uric Acid 04/28/2022 7.8 (A)   Admission on 04/27/2022, Discharged on 04/27/2022   Component Date Value   • Glucose 04/27/2022 214 (A)   • Glucose 04/27/2022 182 (A)   Lab on 04/25/2022   Component Date Value   • COVID19 04/25/2022 Not Detected    Admission on 04/16/2022, Discharged on 04/16/2022   Component Date Value   • Glucose 04/16/2022 219 (A)   • BUN 04/16/2022 19    • Creatinine 04/16/2022 0.66    • Sodium 04/16/2022 131 (A)   • Potassium 04/16/2022 4.2    • Chloride 04/16/2022 96 (A)   • CO2 04/16/2022 20.2 (A)   • Calcium 04/16/2022 9.4    • Total Protein 04/16/2022 7.2    • Albumin 04/16/2022 3.98    • ALT (SGPT) 04/16/2022 18    • AST (SGOT) 04/16/2022 16    • Alkaline Phosphatase 04/16/2022 99    • Total Bilirubin 04/16/2022 0.3    • Globulin 04/16/2022 3.2    • A/G Ratio 04/16/2022 1.2    • BUN/Creatinine Ratio 04/16/2022 28.8 (A)   • Anion Gap 04/16/2022 14.8    • eGFR 04/16/2022 116.8    • Color, UA 04/16/2022 Yellow    • Appearance, UA 04/16/2022 Cloudy (A)   • pH, UA 04/16/2022 <=5.0    • Specific Gravity, UA 04/16/2022 1.016    • Glucose, UA 04/16/2022 100 mg/dL (Trace) (A)   • Ketones, UA 04/16/2022 Negative    • Bilirubin, UA 04/16/2022 Negative    • Blood, UA 04/16/2022 Moderate (2+) (A)   • Protein, UA 04/16/2022 30 mg/dL (1+) (A)   • Leuk Esterase, UA 04/16/2022 Large (3+) (A)   • Nitrite, UA 04/16/2022 Negative    • Urobilinogen, UA 04/16/2022 0.2 E.U./dL    • WBC 04/16/2022 4.85    • RBC 04/16/2022 3.64 (A)   • Hemoglobin 04/16/2022 11.7 (A)   • Hematocrit 04/16/2022 34.2    • MCV 04/16/2022 94.0    • MCH 04/16/2022 32.1    • MCHC 04/16/2022 34.2    • RDW 04/16/2022 13.8    • RDW-SD 04/16/2022 46.7    • MPV 04/16/2022 8.5    • Platelets 04/16/2022 224    • Neutrophil % 04/16/2022 50.1    • Lymphocyte % 04/16/2022 35.9    • Monocyte % 04/16/2022 9.7    • Eosinophil % 04/16/2022 3.3    • Basophil % 04/16/2022 0.6    •  Immature Grans % 04/16/2022 0.4    • Neutrophils, Absolute 04/16/2022 2.43    • Lymphocytes, Absolute 04/16/2022 1.74    • Monocytes, Absolute 04/16/2022 0.47    • Eosinophils, Absolute 04/16/2022 0.16    • Basophils, Absolute 04/16/2022 0.03    • Immature Grans, Absolute 04/16/2022 0.02    • nRBC 04/16/2022 0.0    • RBC, UA 04/16/2022 6-12 (A)   • WBC, UA 04/16/2022 Too Numerous to Count (A)   • Bacteria, UA 04/16/2022 None Seen    • Squamous Epithelial Cell* 04/16/2022 0-2    • Yeast, UA 04/16/2022 Moderate/2+ Budding Yeast    • Hyaline Casts, UA 04/16/2022 None Seen    • Methodology 04/16/2022 Manual Light Microscopy    • Urine Culture 04/16/2022 >100,000 CFU/mL Mixed Margo Isolated    Admission on 04/12/2022, Discharged on 04/12/2022   Component Date Value   • Glucose 04/12/2022 222 (A)   • BUN 04/12/2022 17    • Creatinine 04/12/2022 0.65    • Sodium 04/12/2022 131 (A)   • Potassium 04/12/2022 4.1    • Chloride 04/12/2022 97 (A)   • CO2 04/12/2022 20.3 (A)   • Calcium 04/12/2022 9.5    • Total Protein 04/12/2022 7.4    • Albumin 04/12/2022 3.97    • ALT (SGPT) 04/12/2022 23    • AST (SGOT) 04/12/2022 18    • Alkaline Phosphatase 04/12/2022 103    • Total Bilirubin 04/12/2022 0.2    • Globulin 04/12/2022 3.4    • A/G Ratio 04/12/2022 1.2    • BUN/Creatinine Ratio 04/12/2022 26.2 (A)   • Anion Gap 04/12/2022 13.7    • eGFR 04/12/2022 117.2    • Color, UA 04/12/2022 Yellow    • Appearance, UA 04/12/2022 Cloudy (A)   • pH, UA 04/12/2022 <=5.0    • Specific Gravity, UA 04/12/2022 1.015    • Glucose, UA 04/12/2022 100 mg/dL (Trace) (A)   • Ketones, UA 04/12/2022 Negative    • Bilirubin, UA 04/12/2022 Negative    • Blood, UA 04/12/2022 Small (1+) (A)   • Protein, UA 04/12/2022 30 mg/dL (1+) (A)   • Leuk Esterase, UA 04/12/2022 Moderate (2+) (A)   • Nitrite, UA 04/12/2022 Negative    • Urobilinogen, UA 04/12/2022 0.2 E.U./dL    • WBC 04/12/2022 5.69    • RBC 04/12/2022 3.59 (A)   • Hemoglobin 04/12/2022 11.7 (A)   •  Hematocrit 04/12/2022 33.5 (A)   • MCV 04/12/2022 93.3    • MCH 04/12/2022 32.6    • MCHC 04/12/2022 34.9    • RDW 04/12/2022 13.7    • RDW-SD 04/12/2022 45.7    • MPV 04/12/2022 8.5    • Platelets 04/12/2022 228    • C-Reactive Protein 04/12/2022 0.53 (A)   • Lactate 04/12/2022 4.3 (A)   • Scan Slide 04/12/2022     • RBC, UA 04/12/2022 6-12 (A)   • WBC, UA 04/12/2022 Too Numerous to Count (A)   • Bacteria, UA 04/12/2022 None Seen    • Squamous Epithelial Cell* 04/12/2022 0-2    • Yeast, UA 04/12/2022 Small/1+ Budding Yeast    • Hyaline Casts, UA 04/12/2022 None Seen    • Methodology 04/12/2022 Manual Light Microscopy    • Neutrophil % 04/12/2022 55.0    • Lymphocyte % 04/12/2022 37.5    • Monocyte % 04/12/2022 5.5    • Eosinophil % 04/12/2022 1.0    • Basophil % 04/12/2022 1.0    • Neutrophils Absolute 04/12/2022 3.13    • Lymphocytes Absolute 04/12/2022 2.13    • Monocytes Absolute 04/12/2022 0.31    • Eosinophils Absolute 04/12/2022 0.06    • Basophils Absolute 04/12/2022 0.06    • RBC Morphology 04/12/2022 Normal    • Platelet Morphology 04/12/2022 Normal    • Urine Culture 04/12/2022 >100,000 CFU/mL Mixed Margo Isolated    • Blood Culture 04/12/2022 No growth at 5 days    • Blood Culture 04/12/2022 No growth at 5 days    Admission on 04/10/2022, Discharged on 04/10/2022   Component Date Value   • Glucose 04/10/2022 309 (A)   • BUN 04/10/2022 22 (A)   • Creatinine 04/10/2022 0.81    • Sodium 04/10/2022 128 (A)   • Potassium 04/10/2022 4.5    • Chloride 04/10/2022 92 (A)   • CO2 04/10/2022 14.4 (A)   • Calcium 04/10/2022 10.2    • Total Protein 04/10/2022 8.7 (A)   • Albumin 04/10/2022 4.48    • ALT (SGPT) 04/10/2022 24    • AST (SGOT) 04/10/2022 15    • Alkaline Phosphatase 04/10/2022 126 (A)   • Total Bilirubin 04/10/2022 0.3    • Globulin 04/10/2022 4.2    • A/G Ratio 04/10/2022 1.1    • BUN/Creatinine Ratio 04/10/2022 27.2 (A)   • Anion Gap 04/10/2022 21.6 (A)   • eGFR 04/10/2022 96.6    • C-Reactive Protein  04/10/2022 1.26 (A)   • WBC 04/10/2022 8.17    • RBC 04/10/2022 3.83    • Hemoglobin 04/10/2022 12.2    • Hematocrit 04/10/2022 35.1    • MCV 04/10/2022 91.6    • MCH 04/10/2022 31.9    • MCHC 04/10/2022 34.8    • RDW 04/10/2022 13.6    • RDW-SD 04/10/2022 44.8    • MPV 04/10/2022 8.5    • Platelets 04/10/2022 278    • Neutrophil % 04/10/2022 80.5 (A)   • Lymphocyte % 04/10/2022 15.5 (A)   • Monocyte % 04/10/2022 3.3 (A)   • Eosinophil % 04/10/2022 0.0 (A)   • Basophil % 04/10/2022 0.2    • Immature Grans % 04/10/2022 0.5    • Neutrophils, Absolute 04/10/2022 6.57    • Lymphocytes, Absolute 04/10/2022 1.27    • Monocytes, Absolute 04/10/2022 0.27    • Eosinophils, Absolute 04/10/2022 0.00    • Basophils, Absolute 04/10/2022 0.02    • Immature Grans, Absolute 04/10/2022 0.04    • nRBC 04/10/2022 0.0    • Extra Tube 04/10/2022 Hold for add-ons.    • Extra Tube 04/10/2022 hold for add-on    • Extra Tube 04/10/2022 Hold for add-ons.    • Extra Tube 04/10/2022 hold for add-on    • Acetone 04/10/2022 Negative    • Phosphorus 04/10/2022 2.9    • Magnesium 04/10/2022 1.6    • Hemoglobin A1C 04/10/2022 5.60    • HCG Qualitative 04/10/2022 Negative    • Site 04/10/2022 Left Brachial    • Apolinar's Test 04/10/2022 N/A    • pH, Arterial 04/10/2022 7.375    • pCO2, Arterial 04/10/2022 31.2 (A)   • pO2, Arterial 04/10/2022 91.7    • HCO3, Arterial 04/10/2022 18.2 (A)   • Base Excess, Arterial 04/10/2022 -6.0 (A)   • O2 Saturation, Arterial 04/10/2022 98.2    • Hemoglobin, Blood Gas 04/10/2022 12.4 (A)   • Hematocrit, Blood Gas 04/10/2022 37.9 (A)   • Oxyhemoglobin 04/10/2022 96.8    • Methemoglobin 04/10/2022 0.20    • Carboxyhemoglobin 04/10/2022 1.2    • A-a DO2 04/10/2022 16.0    • CO2 Content 04/10/2022 19.2 (A)   • Temperature 04/10/2022 0.0    • Barometric Pressure for * 04/10/2022 727    • Modality 04/10/2022 Room Air    • FIO2 04/10/2022 21    • Ventilator Mode 04/10/2022 NA    • Note 04/10/2022 Read back and acknowledge     • Notified Who 04/10/2022 DR CHANG // RN     • Notified By 04/10/2022 SHRADDHA    • Collected by 04/10/2022 [id number]    There may be more visits with results that are not included.            Assessment & Plan   Natalia Arzate is a very pleasant 37 y.o.  female who presents in follow up appointment for further management and evaluation of heterozygous Factor V Leiden mutation.     Heterozygous Factor V Leiden mutation  Heterozygous [id number] MTHFR mutation  - At present she is on anticoagulation and has a filter in place will continue. Patient was previously evaluated by Dr. De Dios after she was found to have a provoked DVT and bilateral PE after hormone therapy. She underwent workup and was found to be heterozygous for the FVL mutation and heterozygous [id number] MTHFR mutation. Given the provoked nature it was recommended that she continue anticoagulation for one year in which she was taking Coumadin.   - Will repeat homocysteine level, antithrombin panel, Protein C&S studies, as well as antiphospholipid studies today.  - Regardless of workup would recommend life long anticoagulation given her history of recurrent thrombosis. She also carries a history significant for atrial fibrillation and Neurology also recommended ongoing anticoagulation unless contraindicated as per patient. If she develops thrombosis while on Eliquis would than recommend Coumadin.  - I discussed the risks and benefits as well as the bleeding risk profile with each anticoagulation treatment (such as Warfarin, Xarelto, Eliquis) and the risk of bleeding that can occur while being on any anticoagulation. She understands that should spontaneous or abnormal bleeding occur she must seek immediate medical attention. I also advised her against high impact sports/activities while on anticoagulation that would place him at increased risk of bleeding. No NSAID use while on treatment as this can increase the risk of bleeding.  - I have also advised that  she obtain a medical band while on anticoagulation so that this would alert paramedics, medical personal, etc that she is on anticoagulation should she ever require medical attention.      ACO / CHERELLE/Other  Quality measures  -  Natalia Arzate  reports that she has never smoked. She has never used smokeless tobacco.  -  Natalia Arzate received 2021 flu vaccine.  -  Natalia Arzate reports a pain score of 0.  Given her pain assessment as noted, treatment options were discussed and the following options were decided upon as a follow-up plan to address the patient's pain: continuation of current treatment plan for pain.  -  Current outpatient and discharge medications have been reconciled for the patient.  Reviewed by: Alison Constantino MD      I will have the patient return in follow up appointment in two weeks for follow up. She understands that should she have any questions or concerns prior to her appointment she should give us a call at any time and I would be happy to see her sooner. It was a pleasure to see this patient in clinic today, thank you for allowing me to participate in the care of this patient.          Alison Constantino MD  05/26/22   14:20 EDT

## 2022-05-26 NOTE — PATIENT INSTRUCTIONS
"Textbook of Natural Medicine (5th ed., pp. 2799-4517.e3). [place], [place]: [employer].\">   Dietary Guidelines to Help Prevent Kidney Stones  Kidney stones are deposits of minerals and salts that form inside your kidneys. Your risk of developing kidney stones may be greater depending on your diet, your lifestyle, the medicines you take, and whether you have certain medical conditions. Most people can lower their chances of developing kidney stones by following the instructions below. Your dietitian may give you more specific instructions depending on your overall health and the type of kidney stones you tend to develop.  What are tips for following this plan?  Reading food labels    Choose foods with \"no salt added\" or \"low-salt\" labels. Limit your salt (sodium) intake to less than 1,500 mg a day.  Choose foods with calcium for each meal and snack. Try to eat about 300 mg of calcium at each meal. Foods that contain 200-500 mg of calcium a serving include:  8 oz (237 mL) of milk, calcium-fortifiednon-dairy milk, and calcium-fortifiedfruit juice. Calcium-fortified means that calcium has been added to these drinks.  8 oz (237 mL) of kefir, yogurt, and soy yogurt.  4 oz (114 g) of tofu.  1 oz (28 g) of cheese.  1 cup (150 g) of dried figs.  1 cup (91 g) of cooked broccoli.  One 3 oz (85 g) can of sardines or mackerel.  Most people need 1,000-1,500 mg of calcium a day. Talk to your dietitian about how much calcium is recommended for you.  Shopping  Buy plenty of fresh fruits and vegetables. Most people do not need to avoid fruits and vegetables, even if these foods contain nutrients that may contribute to kidney stones.  When shopping for convenience foods, choose:  Whole pieces of fruit.  Pre-made salads with dressing on the side.  Low-fat fruit and yogurt smoothies.  Avoid buying frozen meals or prepared deli foods. These can be high in sodium.  Look for foods with live cultures, such as yogurt and kefir.  Choose high-fiber " grains, such as whole-wheat breads, oat bran, and wheat cereals.  Cooking  Do not add salt to food when cooking. Place a salt shaker on the table and allow each person to add his or her own salt to taste.  Use vegetable protein, such as beans, textured vegetable protein (TVP), or tofu, instead of meat in pasta, casseroles, and soups.  Meal planning  Eat less salt, if told by your dietitian. To do this:  Avoid eating processed or pre-made food.  Avoid eating fast food.  Eat less animal protein, including cheese, meat, poultry, or fish, if told by your dietitian. To do this:  Limit the number of times you have meat, poultry, fish, or cheese each week. Eat a diet free of meat at least 2 days a week.  Eat only one serving each day of meat, poultry, fish, or seafood.  When you prepare animal protein, cut pieces into small portion sizes. For most meat and fish, one serving is about the size of the palm of your hand.  Eat at least five servings of fresh fruits and vegetables each day. To do this:  Keep fruits and vegetables on hand for snacks.  Eat one piece of fruit or a handful of berries with breakfast.  Have a salad and fruit at lunch.  Have two kinds of vegetables at dinner.  Limit foods that are high in a substance called oxalate. These include:  Spinach (cooked), rhubarb, beets, sweet potatoes, and Swiss chard.  Peanuts.  Potato chips, french fries, and baked potatoes with skin on.  Nuts and nut products.  Chocolate.  If you regularly take a diuretic medicine, make sure to eat at least 1 or 2 servings of fruits or vegetables that are high in potassium each day. These include:  Avocado.  Banana.  Orange, prune, carrot, or tomato juice.  Baked potato.  Cabbage.  Beans and split peas.  Lifestyle    Drink enough fluid to keep your urine pale yellow. This is the most important thing you can do. Spread your fluid intake throughout the day.  If you drink alcohol:  Limit how much you use to:  0-1 drink a day for women who  are not pregnant.  0-2 drinks a day for men.  Be aware of how much alcohol is in your drink. In the [place] one drink equals one 12 oz bottle of beer (355 mL), one 5 oz glass of wine (148 mL), or one 1½ oz glass of hard liquor (44 mL).  Lose weight if told by your health care provider. Work with your dietitian to find an eating plan and weight loss strategies that work best for you.    General information  Talk to your health care provider and dietitian about taking daily supplements. You may be told the following depending on your health and the cause of your kidney stones:  Not to take supplements with vitamin C.  To take a calcium supplement.  To take a daily probiotic supplement.  To take other supplements such as magnesium, fish oil, or vitamin B6.  Take over-the-counter and prescription medicines only as told by your health care provider. These include supplements.  What foods should I limit?  Limit your intake of the following foods, or eat them as told by your dietitian.  Vegetables  Spinach. Rhubarb. Beets. Canned vegetables. Pickles. Olives. Baked potatoes with skin.  Grains  Wheat bran. Baked goods. Salted crackers. Cereals high in sugar.  Meats and other proteins  Nuts. Nut butters. Large portions of meat, poultry, or fish. Salted, precooked, or cured meats, such as sausages, meat loaves, and hot dogs.  Dairy  Cheese.  Beverages  Regular soft drinks. Regular vegetable juice.  Seasonings and condiments  Seasoning blends with salt. Salad dressings. Soy sauce. Ketchup. Barbecue sauce.  Other foods  Canned soups. Canned pasta sauce. Casseroles. Pizza. Lasagna. Frozen meals. Potato chips. French fries.  The items listed above may not be a complete list of foods and beverages you should limit. Contact a dietitian for more information.  What foods should I avoid?  Talk to your dietitian about specific foods you should avoid based on the type of kidney stones you have and your overall  health.  Fruits  Grapefruit.  The item listed above may not be a complete list of foods and beverages you should avoid. Contact a dietitian for more information.  Summary  Kidney stones are deposits of minerals and salts that form inside your kidneys.  You can lower your risk of kidney stones by making changes to your diet.  The most important thing you can do is drink enough fluid. Drink enough fluid to keep your urine pale yellow.  Talk to your dietitian about how much calcium you should have each day, and eat less salt and animal protein as told by your dietitian.  This information is not intended to replace advice given to you by your health care provider. Make sure you discuss any questions you have with your health care provider.  Document Revised: 12/10/2020 Document Reviewed: 12/10/2020  [employer] Patient Education © 2021 [employer]  Discussed a kidney stone prevention diet to include increasing p.o. fluid intake, to at least 1 to 2 L of water daily.  She is to avoid caffeine products such as cola, coffee, and to avoid soft or soda drinks.  She is to decrease her sodium consumption as in  Fast foods, palomino, salted nuts, canned foods, and smoked/cured foods. She is also to decrease her oxalate consumption, as in spinach, Edin greens, and Rhubarb.  Also important is to decrease protein intake, as in red meats, peanut butter, and also avoid nuts.

## 2022-05-26 NOTE — PROGRESS NOTES
Chief Complaint  BILATERAL FLANK PAIN/ACUTE CYSTITS/KIDNEY STONES (SURGERY FOLLOW UP FLANK PAIN/POSS UTI/IBS-C)    Subjective          Natalia Arzate presents to [facility]  History of Present Illness     Ms. Natalia Arzate is a pleasant 37-year-old female with significant history of recurrent kidney stones, acute cystitis with hematuria, flank pain, who returns to clinic today for follow-up.  SHe is wheelchair dependent secondary to significant debility.  She is a frequent flyer ER for pain management registrant over 10 CAT scans in 6 months time span.    She had Cystoscopy, Ureteroscopy Laser Lithotripsy and stent removal for a very painful left UPJ Stone By Dr. Motley on 04/27/2022 . She is in no apparent distress today, and reports doing relatively well post procedure and is in no apparent distress upon exam today.     Patient however still reports left lung pain and groin discomfort with Nausea. She reports chronic back pain,  flank pain but denies any CVA tenderness.  She reports  urinary symptoms of dysuria, frequency,urgency and incontinence.  Denies any recent episodes gross hematuria. Her Urine dipstick today still showed 3+ leukocyte esterase otherwise is completely negative for any nitrite/infection. She has 3+ microscopic hematuria.    We both reviewed/discussed her KUB results today which are unremarkable with no dilation. The stent that was on the left side is no longer present. There are clips in the right upper quadrant most likely from previous cholecystectomy. No calcifications were demonstratable overlying the kidneys or along the course of the ureters    GASTROINTESTINAL TRACT: Abundant distal colonic stool.  This is characterized by extreme amounts of constipation.  We spoke about the impact of this on bladder function.  We spoke about  its relationship to recurrent urinary tract infections, vaginal pain, pelvic/bladder pressure, back pain, flank  "pain she is experiencing.     Patient has a complex past medical history as listed below.    Objective   Vital Signs:  Ht 162.6 cm (64.02\")   Wt 109 kg (240 lb)   BMI 41.18 kg/m²   Class 3 Severe Obesity (BMI >=40). Obesity-related health conditions include the following: obstructive sleep apnea, hypertension, coronary heart disease, dyslipidemias, GERD and osteoarthritis. Obesity is improving with lifestyle modifications. BMI is 41.20. We discussed portion control and increasing exercise.    Physical Exam  Constitutional:       General: She is in acute distress.      Appearance: She is well-developed. She is obese. She is ill-appearing.   HENT:      Head: Normocephalic and atraumatic.   Eyes:      Pupils: Pupils are equal, round, and reactive to light.   Neck:      Thyroid: No thyromegaly.      Trachea: No tracheal deviation.   Cardiovascular:      Rate and Rhythm: Normal rate and regular rhythm.      Heart sounds: No murmur heard.  Pulmonary:      Effort: Pulmonary effort is normal. No respiratory distress.      Breath sounds: Normal breath sounds. No stridor. No wheezing.   Abdominal:      General: Bowel sounds are normal. There is distension.      Palpations: Abdomen is soft.      Tenderness: There is abdominal tenderness. There is left CVA tenderness, guarding and rebound.   Genitourinary:     Labia:         Right: No tenderness.         Left: No tenderness.       Vagina: Normal. No vaginal discharge.      Comments: Urinary frequency, urgency, dysuria, incontinence  Musculoskeletal:         General: Tenderness present. No deformity. Normal range of motion.      Cervical back: Normal range of motion. Tenderness present.      Right lower leg: Edema present.      Left lower leg: Edema present.   Skin:     General: Skin is warm and dry.      Capillary Refill: Capillary refill takes less than 2 seconds.      Coloration: Skin is pale.      Findings: Bruising present. No erythema or rash.   Neurological:      Mental " Status: She is alert and oriented to person, place, and time.      Cranial Nerves: No cranial nerve deficit.      Sensory: No sensory deficit.      Motor: Weakness present.      Coordination: Coordination abnormal.      Gait: Gait abnormal.   Psychiatric:         Behavior: Behavior normal.         Thought Content: Thought content normal.         Judgment: Judgment normal.        Result Review :     CMP    CMP 5/8/22 5/12/22 5/20/22   Glucose 208 (A) 201 (A)    Glucose   206 (A)   BUN 19 23 (A) 19   Creatinine 0.70 0.75 0.79   eGFR Non  Am   >60   eGFR African Am   >60   Sodium 133 (A) 136 132 (A)   Potassium 4.2 4.7 4.4   Chloride 97 (A) 100 97   Calcium 9.7 9.9 9.8   Albumin 4.31 4.34 4.4   Total Bilirubin 0.4 0.4 0.5   Alkaline Phosphatase 118 (A) 120 (A) 107 (A)   AST (SGOT) 18 19 29   ALT (SGPT) 18 21 25   (A) Abnormal value       Comments are available for some flowsheets but are not being displayed.           CBC w/diff    CBC w/Diff 5/12/22 5/20/22 5/26/22   WBC 5.50 5.78 4.65   RBC 3.91 3.73 (A) 3.63 (A)   Hemoglobin 12.5 12.2 11.7 (A)   Hematocrit 35.5 34.1 32.9 (A)   MCV 90.8 91 90.6   MCH 32.0 32.7 (A) 32.2   MCHC 35.2 35.8 (A) 35.6   RDW 13.5 13.5 13.0   Platelets 237 213 229   Neutrophil Rel %  58.0    Immature Granulocyte Rel %  0.0    Lymphocyte Rel %  32.0    Monocyte Rel %  8.0    Eosinophil Rel %  2.0    Basophil Rel %  0.0    (A) Abnormal value            Renal Profile    Renal Profile 5/8/22 5/12/22 5/20/22   BUN 19 23 (A) 19   Creatinine 0.70 0.75 0.79   eGFR Non  Am   >60   eGFR  Am   >60   (A) Abnormal value       Comments are available for some flowsheets but are not being displayed.           UA    Urinalysis 5/12/22 5/12/22 5/20/22 5/24/22 2011 2011     Squamous Epithelial Cells, UA  0-2 6-10 (A)    Specific Gravity, UA 1.020  1.023    Ketones, UA Negative   Negative   Blood, UA Moderate (2+) (A)  Negative    Leukocytes, UA Small (1+) (A)  Moderate (A) Large (3+) (A)    Nitrite, UA Negative      RBC, UA  3-5 (A) 4-10 (A)    WBC, UA  31-50 (A)     Bacteria, UA  Trace (A) Negative    (A) Abnormal value       Comments are available for some flowsheets but are not being displayed.           Urine Culture    Urine Culture 4/16/22 5/8/22 5/24/22   Urine Culture >100,000 CFU/mL Mixed Margo Isolated No growth 25,000 CFU/mL Mixed Margo Isolated           Data reviewed: Radiologic studies Multiple CTs-unremarkable, KUB positive for IBS-C          Assessment and Plan    Diagnoses and all orders for this visit:    1. Left flank pain (Primary)  -     XR abdomen kub  -     polyethylene glycol (MiraLax) 17 g packet; Take 17 g by mouth Daily.  Dispense: 30 each; Refill: 3  -     senna (Senokot) 8.6 MG tablet; Take 2 tablets by mouth Daily.  Dispense: 60 tablet; Refill: 3    2. Kidney stone  -     POC Urinalysis Dipstick, Automated  -     Urine Culture - Urine, Urine, Clean Catch  -     XR abdomen kub  -     polyethylene glycol (MiraLax) 17 g packet; Take 17 g by mouth Daily.  Dispense: 30 each; Refill: 3  -     senna (Senokot) 8.6 MG tablet; Take 2 tablets by mouth Daily.  Dispense: 60 tablet; Refill: 3    3. Left nephrolithiasis  -     XR abdomen kub  -     polyethylene glycol (MiraLax) 17 g packet; Take 17 g by mouth Daily.  Dispense: 30 each; Refill: 3  -     senna (Senokot) 8.6 MG tablet; Take 2 tablets by mouth Daily.  Dispense: 60 tablet; Refill: 3    4. Chronic idiopathic constipation  -     polyethylene glycol (MiraLax) 17 g packet; Take 17 g by mouth Daily.  Dispense: 30 each; Refill: 3  -     senna (Senokot) 8.6 MG tablet; Take 2 tablets by mouth Daily.  Dispense: 60 tablet; Refill: 3    5. Ureteral calculus  -     oxyCODONE-acetaminophen (Percocet)  MG per tablet; Take 1 tablet by mouth Every 4 (Four) Hours As Needed for Moderate Pain.  Dispense: 12 tablet; Refill: 0         ASSESSMENT     NEPHROLITHIASIS-RESOLVED/BILATERAL FLANK PAIN/ACUTE CYSTITIS  Ms. Natalia Arzate is a  pleasant 37-year-old female with significant history of recurrent kidney stones, acute cystitis with hematuria, flank pain, who returns to clinic today for follow-up accompanied by her son.     Patient is seen for follow up today and reports doing relatively well post procedure Ureteroscopy Laser Lithotripsy in April for left UPJ stone that had become very bothersome to her.  SHe denies having any post opt complications from procedure such as fevers, chills, bleeding, hematoma,  and has had no discomfort what soever today.  She however continues to report flank pain, lower back pain which is chronic in origin, with urinary symptoms of frequency and urgency today consistent with acute cystitis.  However her urine dipstick showed 3+ leukocyte esterase negative nitrates, she has 3+ microscopic hematuria.  Her KUB is negative for kidney stones, but showed moderate amount of stool.       PLAN  We will resend her urine for culture, alcohol results if any positive bacterial growth.  Post extensive antibiotic therapy during hospitalization at     Has been encouraged to increase her p.o. fluid intake to at least 2 to 3 L daily,    Discussed a kidney stone prevention diet to include increasing p.o. fluid intake, to at least 1 to 2 L of water daily.  She is to avoid caffeine products such as cola, coffee, and to avoid soft or soda drinks.  She is to decrease her sodium consumption as in  Fast foods, palomino, salted nuts, canned foods, and smoked/cured foods. She is also to decrease her oxalate consumption, as in spinach, Edin greens, and Rhubarb.  Also important is to decrease protein intake, as in red meats, peanut butter, and also avoid nuts.     We discussed treatment options for her flank pain with patient encouraged to continue conservative therapy alternating NSAID/Tylenol as tolerated, Also including hot baths, showers, warm compresses to lower back as tolerated. Also encouraged walking, physical therapy, light  exercises as tolerated    Rest is the most important treatment in the case of flank pain. Rest and physical therapy are enough to improve minor pain. Discussed to monitor her daily routine with prevention of flank pain by: At least Drinking 8 glasses of water per day, Limiting your alcohol consumption.  Having a healthy diet containing fruits, vegetables, and a lot of fluids, Practicing safe sex.  Also maintaining proper hygiene of your body as well as the environment.    Return to clinic for follow-up as discussed, sooner if need be.    Patient is agreeable plan of care    Narcotic pain medication-The Patient has significant acute pain that I believe would be an indication for the use of narcotic pain medication.  I discussed the significant risks of pain medication and the fact that this will be a short only option and I will give her no more than a three-day supply of pain medication.  And I will not plan long-term medication and that  PATIENT will be sent to a pain clinic if at all becomes necessary.      We discussed signing a pain medication agreement and the fact that we're going to run a state PRANAY review to be sure the patient is not getting pain medication from elsewhere.  If this is the case we will not give pain medication.  As part of the patient's treatment plan if they are being prescribed a controlled substance with potential for abuse.      This patient has been made aware of the appropriate dose of such medications including, the risk for somnolence, limited ability to drive and/or safety and the significant potential for overdose.  It has been made clear that these medications are for the prescribed patient only without concomitant use of alcohol or other CONTROLLED substances unless prescribed by the medical provider.  THE PATIENT Has completed prescribing agreement detailing the terms TO continue prescribing him a controlled substance,  Including monitoring PRANAY REPORTS, the possibility of  urine drug screens and PILL Counts. The patient is aware that we review PRANAY reports on a regular basis and scan them into the chart.  History and physical examination PORTRAYED continued safe and appropriate use of controlled substances.    FINALLY, Also discussed the fact that the new [place] legislation allows only a THREE-DAY prescription for pain medication.  In this situation he will be referred to a chronic pain clinic if need be .    THE PATIENT IS AGREEABLE WITH PLAN OF CARE.    Follow Up   Return in about 1 year (around 5/24/2023), or if symptoms worsen or fail to improve, for Next scheduled follow up, RECURRENT UTI/DYSURIA/DETRUSSOR INSTABILITY, GROSS/MICRO HEMATURIA.  Patient was given instructions and counseling regarding her condition or for health maintenance advice. Please see specific information pulled into the AVS if appropriate.

## 2022-05-27 LAB
CARDIOLIPIN IGG SER IA-ACNC: <9 GPL U/ML (ref 0–14)
CARDIOLIPIN IGM SER IA-ACNC: <9 MPL U/ML (ref 0–12)
LA 2 SCREEN W REFLEX-IMP: NORMAL
SCREEN APTT: 33 SEC (ref 0–51.9)
SCREEN DRVVT: 37 SEC (ref 0–47)

## 2022-05-28 LAB
AT III ACT/NOR PPP CHRO: 142 % (ref 75–135)
AT III AG ACT/NOR PPP IA: 110 % (ref 72–124)
PROT C ACT/NOR PPP: 196 % (ref 73–180)
PROT S ACT/NOR PPP: 86 % (ref 63–140)

## 2022-05-29 LAB
B2 GLYCOPROT1 IGA SER-ACNC: <9 GPI IGA UNITS (ref 0–25)
B2 GLYCOPROT1 IGG SER-ACNC: <9 GPI IGG UNITS (ref 0–20)
B2 GLYCOPROT1 IGM SER-ACNC: <9 GPI IGM UNITS (ref 0–32)

## 2022-06-02 ENCOUNTER — APPOINTMENT (OUTPATIENT)
Dept: GENERAL RADIOLOGY | Facility: HOSPITAL | Age: 37
End: 2022-06-02

## 2022-06-02 ENCOUNTER — HOSPITAL ENCOUNTER (EMERGENCY)
Facility: HOSPITAL | Age: 37
Discharge: HOME | End: 2022-06-02
Attending: EMERGENCY MEDICINE | Admitting: EMERGENCY MEDICINE

## 2022-06-02 VITALS
TEMPERATURE: 98.4 F | RESPIRATION RATE: 16 BRPM | DIASTOLIC BLOOD PRESSURE: 89 MMHG | HEIGHT: 64 IN | WEIGHT: 250 LBS | SYSTOLIC BLOOD PRESSURE: 132 MMHG | HEART RATE: 95 BPM | BODY MASS INDEX: 42.68 KG/M2 | OXYGEN SATURATION: 97 %

## 2022-06-02 DIAGNOSIS — R10.9 FLANK PAIN: Primary | ICD-10-CM

## 2022-06-02 LAB
ALBUMIN SERPL-MCNC: 4.7 G/DL (ref 3.5–5.2)
ALBUMIN/GLOB SERPL: 1.3 G/DL
ALP SERPL-CCNC: 118 U/L (ref 39–117)
ALT SERPL W P-5'-P-CCNC: 21 U/L (ref 1–33)
ANION GAP SERPL CALCULATED.3IONS-SCNC: 15.1 MMOL/L (ref 5–15)
AST SERPL-CCNC: 27 U/L (ref 1–32)
BACTERIA UR QL AUTO: ABNORMAL /HPF
BILIRUB SERPL-MCNC: 0.5 MG/DL (ref 0–1.2)
BILIRUB UR QL STRIP: NEGATIVE
BUN SERPL-MCNC: 22 MG/DL (ref 6–20)
BUN/CREAT SERPL: 31 (ref 7–25)
CALCIUM SPEC-SCNC: 9.8 MG/DL (ref 8.6–10.5)
CHLORIDE SERPL-SCNC: 97 MMOL/L (ref 98–107)
CLARITY UR: CLEAR
CO2 SERPL-SCNC: 20.9 MMOL/L (ref 22–29)
COLOR UR: YELLOW
CREAT SERPL-MCNC: 0.71 MG/DL (ref 0.57–1)
CRP SERPL-MCNC: 1.53 MG/DL (ref 0–0.5)
DEPRECATED RDW RBC AUTO: 41 FL (ref 37–54)
EGFRCR SERPLBLD CKD-EPI 2021: 113.2 ML/MIN/1.73
EOSINOPHIL # BLD MANUAL: 0.17 10*3/MM3 (ref 0–0.4)
EOSINOPHIL NFR BLD MANUAL: 3 % (ref 0.3–6.2)
ERYTHROCYTE [DISTWIDTH] IN BLOOD BY AUTOMATED COUNT: 12.5 % (ref 12.3–15.4)
GLOBULIN UR ELPH-MCNC: 3.6 GM/DL
GLUCOSE SERPL-MCNC: 204 MG/DL (ref 65–99)
GLUCOSE UR STRIP-MCNC: ABNORMAL MG/DL
HCT VFR BLD AUTO: 35.6 % (ref 34–46.6)
HGB BLD-MCNC: 12.7 G/DL (ref 12–15.9)
HGB UR QL STRIP.AUTO: ABNORMAL
HYALINE CASTS UR QL AUTO: ABNORMAL /LPF
KETONES UR QL STRIP: NEGATIVE
LEUKOCYTE ESTERASE UR QL STRIP.AUTO: ABNORMAL
LYMPHOCYTES # BLD MANUAL: 2.32 10*3/MM3 (ref 0.7–3.1)
LYMPHOCYTES NFR BLD MANUAL: 11 % (ref 5–12)
MCH RBC QN AUTO: 32.5 PG (ref 26.6–33)
MCHC RBC AUTO-ENTMCNC: 35.7 G/DL (ref 31.5–35.7)
MCV RBC AUTO: 91 FL (ref 79–97)
MONOCYTES # BLD: 0.61 10*3/MM3 (ref 0.1–0.9)
NEUTROPHILS # BLD AUTO: 2.43 10*3/MM3 (ref 1.7–7)
NEUTROPHILS NFR BLD MANUAL: 44 % (ref 42.7–76)
NITRITE UR QL STRIP: NEGATIVE
PH UR STRIP.AUTO: 5.5 [PH] (ref 5–8)
PLAT MORPH BLD: NORMAL
PLATELET # BLD AUTO: 241 10*3/MM3 (ref 140–450)
PMV BLD AUTO: 8.3 FL (ref 6–12)
POTASSIUM SERPL-SCNC: 4.2 MMOL/L (ref 3.5–5.2)
PROT SERPL-MCNC: 8.3 G/DL (ref 6–8.5)
PROT UR QL STRIP: ABNORMAL
RBC # BLD AUTO: 3.91 10*6/MM3 (ref 3.77–5.28)
RBC # UR STRIP: ABNORMAL /HPF
RBC MORPH BLD: NORMAL
REF LAB TEST METHOD: ABNORMAL
SCAN SLIDE: NORMAL
SODIUM SERPL-SCNC: 133 MMOL/L (ref 136–145)
SP GR UR STRIP: 1.02 (ref 1–1.03)
SQUAMOUS #/AREA URNS HPF: ABNORMAL /HPF
UROBILINOGEN UR QL STRIP: ABNORMAL
VARIANT LYMPHS NFR BLD MANUAL: 42 % (ref 19.6–45.3)
WBC # UR STRIP: ABNORMAL /HPF
WBC NRBC COR # BLD: 5.52 10*3/MM3 (ref 3.4–10.8)
YEAST URNS QL MICRO: ABNORMAL /HPF

## 2022-06-02 PROCEDURE — 85007 BL SMEAR W/DIFF WBC COUNT: CPT | Performed by: NURSE PRACTITIONER

## 2022-06-02 PROCEDURE — 80053 COMPREHEN METABOLIC PANEL: CPT | Performed by: NURSE PRACTITIONER

## 2022-06-02 PROCEDURE — 99283 EMERGENCY DEPT VISIT LOW MDM: CPT

## 2022-06-02 PROCEDURE — 96374 THER/PROPH/DIAG INJ IV PUSH: CPT

## 2022-06-02 PROCEDURE — 96375 TX/PRO/DX INJ NEW DRUG ADDON: CPT

## 2022-06-02 PROCEDURE — 25010000002 ONDANSETRON PER 1 MG: Performed by: NURSE PRACTITIONER

## 2022-06-02 PROCEDURE — 85025 COMPLETE CBC W/AUTO DIFF WBC: CPT | Performed by: NURSE PRACTITIONER

## 2022-06-02 PROCEDURE — 25010000002 HYDROMORPHONE PER 4 MG: Performed by: NURSE PRACTITIONER

## 2022-06-02 PROCEDURE — 73030 X-RAY EXAM OF SHOULDER: CPT

## 2022-06-02 PROCEDURE — 99284 EMERGENCY DEPT VISIT MOD MDM: CPT

## 2022-06-02 PROCEDURE — 25010000002 MORPHINE PER 10 MG: Performed by: NURSE PRACTITIONER

## 2022-06-02 PROCEDURE — 96361 HYDRATE IV INFUSION ADD-ON: CPT

## 2022-06-02 PROCEDURE — 73030 X-RAY EXAM OF SHOULDER: CPT | Performed by: RADIOLOGY

## 2022-06-02 PROCEDURE — 81001 URINALYSIS AUTO W/SCOPE: CPT | Performed by: NURSE PRACTITIONER

## 2022-06-02 PROCEDURE — 86140 C-REACTIVE PROTEIN: CPT | Performed by: NURSE PRACTITIONER

## 2022-06-02 PROCEDURE — 87086 URINE CULTURE/COLONY COUNT: CPT | Performed by: NURSE PRACTITIONER

## 2022-06-02 RX ORDER — HYDROMORPHONE HYDROCHLORIDE 1 MG/ML
0.5 INJECTION, SOLUTION INTRAMUSCULAR; INTRAVENOUS; SUBCUTANEOUS ONCE
Status: COMPLETED | OUTPATIENT
Start: 2022-06-02 | End: 2022-06-02

## 2022-06-02 RX ORDER — ONDANSETRON 2 MG/ML
4 INJECTION INTRAMUSCULAR; INTRAVENOUS ONCE
Status: COMPLETED | OUTPATIENT
Start: 2022-06-02 | End: 2022-06-02

## 2022-06-02 RX ORDER — SODIUM CHLORIDE 0.9 % (FLUSH) 0.9 %
10 SYRINGE (ML) INJECTION AS NEEDED
Status: DISCONTINUED | OUTPATIENT
Start: 2022-06-02 | End: 2022-06-02 | Stop reason: HOSPADM

## 2022-06-02 RX ADMIN — HYDROMORPHONE HYDROCHLORIDE 0.5 MG: 1 INJECTION, SOLUTION INTRAMUSCULAR; INTRAVENOUS; SUBCUTANEOUS at 12:04

## 2022-06-02 RX ADMIN — MORPHINE SULFATE 4 MG: 4 INJECTION, SOLUTION INTRAMUSCULAR; INTRAVENOUS at 11:01

## 2022-06-02 RX ADMIN — ONDANSETRON 4 MG: 2 INJECTION INTRAMUSCULAR; INTRAVENOUS at 11:01

## 2022-06-02 RX ADMIN — SODIUM CHLORIDE 1000 ML: 9 INJECTION, SOLUTION INTRAVENOUS at 10:56

## 2022-06-02 NOTE — ED PROVIDER NOTES
Subjective     Flank Pain  Pain location:  R flank  Pain quality: sharp    Pain radiates to:  Does not radiate  Pain severity:  Moderate  Onset quality:  Sudden  Duration:  1 day  Timing:  Constant  Progression:  Worsening  Chronicity:  New  Context: not eating, not medication withdrawal, not recent illness and not sick contacts    Relieved by:  Nothing  Worsened by:  Nothing  Ineffective treatments:  None tried  Associated symptoms: nausea    Associated symptoms: no anorexia, no chest pain, no dysuria, no melena, no vaginal discharge and no vomiting    Risk factors: has not had multiple surgeries        Review of Systems   Constitutional: Negative.    HENT: Negative.    Eyes: Negative.    Respiratory: Negative.    Cardiovascular: Negative.  Negative for chest pain.   Gastrointestinal: Positive for nausea. Negative for anorexia, melena and vomiting.   Endocrine: Negative.    Genitourinary: Positive for flank pain. Negative for dysuria and vaginal discharge.   Skin: Negative.    Allergic/Immunologic: Negative.    Neurological: Negative.    Hematological: Negative.    Psychiatric/Behavioral: Negative.        Past Medical History:   Diagnosis Date   • A-fib (HCC)    • Abnormal ECG    • Anemia    • Anxiety    • Asthma    • Cancer (HCC)     Ovarian   • Depression    • Diabetes mellitus (HCC)    • DVT (deep venous thrombosis) (HCC)    • Factor 5 Leiden mutation, heterozygous (HCC)    • Fibroid    • GERD (gastroesophageal reflux disease)    • Gout    • H/O abdominal abscess    • History of sepsis    • History of transfusion    • Hyperlipidemia    • Hypothyroid    • Kidney stone    • Migraines    • Neuropathy    • Ovarian cancer (HCC) 02/2021   • Ovarian cyst    • PE (pulmonary embolism)    • Polycystic ovary syndrome    • Preeclampsia    • Rh incompatibility    • Stroke (HCC)    • TIA (transient ischemic attack)    • Urinary tract infection    • Varicella        Allergies   Allergen Reactions   • Toradol [Ketorolac  Tromethamine] Anaphylaxis and Hives   • Haldol [Haloperidol] Hives and Mental Status Change   • Tramadol Hives and Swelling   • Amoxicillin Hives and Rash   • Penicillins Hives and Rash       Past Surgical History:   Procedure Laterality Date   • ABDOMINAL SURGERY     • CARDIAC CATHETERIZATION     •  SECTION     • CHOLECYSTECTOMY     • COLONOSCOPY     • ENDOSCOPY     • EXTRACORPOREAL SHOCK WAVE LITHOTRIPSY (ESWL) Left 10/22/2021    Procedure: EXTRACORPOREAL SHOCKWAVE LITHOTRIPSY;  Surgeon: Milan Motley MD;  Location: [facility];  Service: Urology;  Laterality: Left;   • LITHOTRIPSY     • RIGHT OOPHORECTOMY     • URETEROSCOPY LASER LITHOTRIPSY WITH STENT INSERTION Left 10/01/2021    Procedure: URETEROSCOPY WITH STENT PLACEMENT;  Surgeon: Milan Motley MD;  Location: [facility];  Service: Urology;  Laterality: Left;   • URETEROSCOPY LASER LITHOTRIPSY WITH STENT INSERTION Left 2022    Procedure: URETEROSCOPY STENT REMOVAL;  Surgeon: Milan Motley MD;  Location: [facility];  Service: Urology;  Laterality: Left;       Family History   Problem Relation Age of Onset   • Hypertension Mother    • Diabetes Father    • Hypertension Father    • Heart attack Father    • No Known Problems Sister    • No Known Problems Brother    • No Known Problems Son    • No Known Problems Daughter    • No Known Problems Maternal Grandmother    • No Known Problems Maternal Grandfather    • No Known Problems Paternal Grandmother    • No Known Problems Paternal Grandfather    • No Known Problems Cousin    • Rheum arthritis Neg Hx    • Osteoarthritis Neg Hx    • Asthma Neg Hx    • Heart failure Neg Hx    • Hyperlipidemia Neg Hx    • Migraines Neg Hx    • Rashes / Skin problems Neg Hx    • Seizures Neg Hx    • Stroke Neg Hx    • Thyroid disease Neg Hx        Social History     Socioeconomic History   • Marital status:    Tobacco Use   • Smoking status: Never Smoker   • Smokeless tobacco: Never Used  "  Vaping Use   • Vaping Use: Never used   Substance and Sexual Activity   • Alcohol use: No   • Drug use: Never     Comment: Pt has track marks on right arm. Pt states \"It's from my INR being drawn.\"    • Sexual activity: Not Currently     Partners: Male     Birth control/protection: None           Objective   Physical Exam  Vitals and nursing note reviewed.   Constitutional:       Appearance: She is well-developed.   HENT:      Head: Normocephalic.      Right Ear: External ear normal.      Left Ear: External ear normal.   Eyes:      Conjunctiva/sclera: Conjunctivae normal.      Pupils: Pupils are equal, round, and reactive to light.   Cardiovascular:      Rate and Rhythm: Normal rate and regular rhythm.      Heart sounds: Normal heart sounds.   Pulmonary:      Effort: Pulmonary effort is normal.      Breath sounds: Normal breath sounds.   Abdominal:      General: Bowel sounds are normal.      Palpations: Abdomen is soft.   Musculoskeletal:         General: Normal range of motion.      Cervical back: Normal range of motion and neck supple.   Skin:     General: Skin is warm and dry.      Capillary Refill: Capillary refill takes less than 2 seconds.   Neurological:      Mental Status: She is alert and oriented to person, place, and time.   Psychiatric:         Behavior: Behavior normal.         Thought Content: Thought content normal.         Procedures           ED Course                                                 MDM    Final diagnoses:   Flank pain       ED Disposition  ED Disposition     ED Disposition   Discharge    Condition   Stable    Comment   --             Eddie Latif, APRN  [address] [facility] [address] [phone]    Schedule an appointment as soon as possible for a visit   For further evaluation         Medication List      No changes were made to your prescriptions during this visit.          Charly Fisher, APRN  06/03/22 1116    "

## 2022-06-03 LAB — BACTERIA SPEC AEROBE CULT: NORMAL

## 2022-06-09 ENCOUNTER — OFFICE VISIT (OUTPATIENT)
Dept: ONCOLOGY | Facility: CLINIC | Age: 37
End: 2022-06-09

## 2022-06-09 VITALS
DIASTOLIC BLOOD PRESSURE: 104 MMHG | OXYGEN SATURATION: 97 % | RESPIRATION RATE: 18 BRPM | SYSTOLIC BLOOD PRESSURE: 123 MMHG | WEIGHT: 250 LBS | TEMPERATURE: 98.4 F | HEIGHT: 64 IN | HEART RATE: 96 BPM | BODY MASS INDEX: 42.68 KG/M2

## 2022-06-09 DIAGNOSIS — D68.51 FACTOR V LEIDEN: Primary | ICD-10-CM

## 2022-06-09 LAB — HCYS SERPL-MCNC: 11.4 UMOL/L (ref 0–15)

## 2022-06-09 PROCEDURE — 99213 OFFICE O/P EST LOW 20 MIN: CPT | Performed by: INTERNAL MEDICINE

## 2022-06-09 PROCEDURE — 83090 ASSAY OF HOMOCYSTEINE: CPT | Performed by: INTERNAL MEDICINE

## 2022-06-09 NOTE — PROGRESS NOTES
Natalia Arzate  [id number]  1985 6/9/2022      Referring Provider:   YEIMI Frausto    Reason for Follow Up:   Factor V Leiden mutation  Deep venous thrombosis (DVT)    Chief Complaint:  Factor V Leiden mutation      History of Present Illness   Natalia Arzate is a very pleasant 37 y.o.  female who presents in follow up appointment for further management and evaluation of Factor V Leiden mutation.     She was previously evaluated by Dr. De Dios in 2013 when she was diagnosed with right lower extremity DVT and bilateral pulmonary embolism. As per his clinic note that was dated on 4/25/14 her workup was positive for heterozygous Factor V Leiden and MTHFR gene mutation. Her thrombosis was felt to be provoked as she was on hormone replacement therapy at the time and Coumadin was recommended for one year. This was later changed to Xarelto by her PCP. She reports being compliant with Xarelto and developing a lower extremity DVT in 2015 at which time she was changed back to Coumadin.     However she has been without Coumadin for one year as she did not follow with her primary provider as scheduled. She presented to [facility] ED after a fall at home with complaints of fevers. She was found to have left pyelonephritis complicated by multiple abscesses as well as superinfection of left iliopsoas hematoma and was transferred to the [facility] on December 26th 2021 where she had a prolonged hospitalizaton. She developed acute respiratory failure and was intubated and also required CRRT/HD. During that admission abscesses were positive for MRSA and E. Coli and she had a left ureteral stent and drain placed which has been removed. She had a repeat CT scan that showed a decrease in size of the left RP collection on 2/7/22. She had an IVC filter placed during her admission and was initially started on heparin drip due to her hematoma and later bivalirudin due to possible heparin resistance which was  held intermittently for procedures or bleeding concerns. This was transitioned to Lovenox and ultimately to Eliquis which she is tolerating well. She also developed an acute/subacute right cerebellar ischemic stroke which was noted on 1/26/22 CT scan compared to CT scan one month prior. Neurology was consulted and recommended continuing anticoagulation. Echo was without intracardiac thrombosis. Since then she has had multiple [facility] ED visits for ongoing flank pain and UTIs and is currently following with Dr. Motley.       Interim History:  She presents today for follow up and continues to be on Eliquis and she did hold this for two days prior to her recent filter removal. She reports increased bleeding during glucose monitoring system replacement and previously did not experience this. She also notes bruising with insulin injections. She also reports flank pain and worries she may be developing a kidney stone.           The following portions of the patient's history were reviewed and updated as appropriate: allergies, current medications, past family history, past medical history, past social history, past surgical history and problem list.    Allergies   Allergen Reactions   • Toradol [Ketorolac Tromethamine] Anaphylaxis and Hives   • Haldol [Haloperidol] Hives and Mental Status Change   • Tramadol Hives and Swelling   • Amoxicillin Hives and Rash   • Penicillins Hives and Rash       Past Medical History:   Diagnosis Date   • A-fib (HCC)    • Abnormal ECG    • Anemia    • Anxiety    • Asthma    • Cancer (HCC)     Ovarian   • Depression    • Diabetes mellitus (HCC)    • DVT (deep venous thrombosis) (HCC)    • Factor 5 Leiden mutation, heterozygous (HCC)    • Fibroid    • GERD (gastroesophageal reflux disease)    • Gout    • H/O abdominal abscess    • History of sepsis    • History of transfusion    • Hyperlipidemia    • Hypothyroid    • Kidney stone    • Migraines    • Neuropathy    • Ovarian cancer (HCC) 02/2021  "  • Ovarian cyst    • PE (pulmonary embolism)    • Polycystic ovary syndrome    • Preeclampsia    • Rh incompatibility    • Stroke (HCC)    • TIA (transient ischemic attack)    • Urinary tract infection    • Varicella    ALVARO at [facility] secondary to ovarian torsion      Past Surgical History:   Procedure Laterality Date   • ABDOMINAL SURGERY     • CARDIAC CATHETERIZATION     •  SECTION     • CHOLECYSTECTOMY     • COLONOSCOPY     • ENDOSCOPY     • EXTRACORPOREAL SHOCK WAVE LITHOTRIPSY (ESWL) Left 10/22/2021    Procedure: EXTRACORPOREAL SHOCKWAVE LITHOTRIPSY;  Surgeon: Milan Motley MD;  Location: [facility];  Service: Urology;  Laterality: Left;   • LITHOTRIPSY     • RIGHT OOPHORECTOMY     • URETEROSCOPY LASER LITHOTRIPSY WITH STENT INSERTION Left 10/01/2021    Procedure: URETEROSCOPY WITH STENT PLACEMENT;  Surgeon: Milan Motley MD;  Location: [facility];  Service: Urology;  Laterality: Left;   • URETEROSCOPY LASER LITHOTRIPSY WITH STENT INSERTION Left 2022    Procedure: URETEROSCOPY STENT REMOVAL;  Surgeon: Milan Motley MD;  Location: [facility];  Service: Urology;  Laterality: Left;           Social History     Socioeconomic History   • Marital status:    Tobacco Use   • Smoking status: Never Smoker   • Smokeless tobacco: Never Used   Vaping Use   • Vaping Use: Never used   Substance and Sexual Activity   • Alcohol use: No   • Drug use: Never     Comment: Pt has track marks on right arm. Pt states \"It's from my INR being drawn.\"    • Sexual activity: Not Currently     Partners: Male     Birth control/protection: None           Family History   Problem Relation Age of Onset   • Hypertension Mother    • Diabetes Father    • Hypertension Father    • Heart attack Father    • No Known Problems Sister    • No Known Problems Brother    • No Known Problems Son    • No Known Problems Daughter    • No Known Problems Maternal Grandmother    • No Known Problems Maternal Grandfather  "   • No Known Problems Paternal Grandmother    • No Known Problems Paternal Grandfather    • No Known Problems Cousin    • Rheum arthritis Neg Hx    • Osteoarthritis Neg Hx    • Asthma Neg Hx    • Heart failure Neg Hx    • Hyperlipidemia Neg Hx    • Migraines Neg Hx    • Rashes / Skin problems Neg Hx    • Seizures Neg Hx    • Stroke Neg Hx    • Thyroid disease Neg Hx      Father - PE          Current Outpatient Medications:   •  albuterol sulfate  (90 Base) MCG/ACT inhaler, Inhale 2 puffs Every 4 (Four) Hours As Needed for Wheezing., Disp: 18 g, Rfl: 2  •  allopurinol (ZYLOPRIM) 100 MG tablet, Take 1 tablet by mouth Daily., Disp: 30 tablet, Rfl: 0  •  amLODIPine (NORVASC) 10 MG tablet, Take 1 tablet by mouth Daily., Disp: 30 tablet, Rfl: 2  •  apixaban (ELIQUIS) 5 MG tablet tablet, Take 1 tablet by mouth 2 (Two) Times a Day., Disp: 60 tablet, Rfl: 2  •  azelastine (ASTELIN) 0.1 % nasal spray, 2 sprays both nostrils twice daily as needed, Disp: 1 each, Rfl: 2  •  cephalexin (KEFLEX) 500 MG capsule, Take 1 capsule by mouth 2 (Two) Times a Day., Disp: 20 capsule, Rfl: 0  •  cyanocobalamin ([employer] Vitamin B-12) 2000 MCG tablet, Take 1 tablet by mouth Daily., Disp: 30 tablet, Rfl: 3  •  DULoxetine (CYMBALTA) 20 MG capsule, Take 1 capsule by mouth Daily., Disp: 30 capsule, Rfl: 2  •  gabapentin (NEURONTIN) 300 MG capsule, Take 1 capsule by mouth 2 (Two) Times a Day for 30 days., Disp: 60 capsule, Rfl: 0  •  glucose blood test strip, 1 each by Other route 3 (Three) Times a Day., Disp: 100 each, Rfl: 2  •  insulin glargine (LANTUS, SEMGLEE) 100 UNIT/ML injection, Inject 25 Units under the skin into the appropriate area as directed Daily., Disp: , Rfl:   •  Insulin Pen Needle 30G X 8 MM misc, 1 Device Daily., Disp: 100 each, Rfl: 0  •  Lancets Micro Thin 33G misc, 1 Device 3 (Three) Times a Day., Disp: 100 each, Rfl: 2  •  metoprolol succinate XL (TOPROL-XL) 25 MG 24 hr tablet, Take 1 tablet by mouth Every Night., Disp:  30 tablet, Rfl: 0  •  montelukast (SINGULAIR) 10 MG tablet, Take 1 tablet by mouth Every Night., Disp: 30 tablet, Rfl: 2  •  ondansetron ODT (ZOFRAN-ODT) 4 MG disintegrating tablet, Place 1 tablet on the tongue Every 6 (Six) Hours As Needed for Nausea or Vomiting., Disp: 12 tablet, Rfl: 0  •  oxyCODONE-acetaminophen (Percocet)  MG per tablet, Take 1 tablet by mouth Every 4 (Four) Hours As Needed for Moderate Pain., Disp: 12 tablet, Rfl: 0  •  polyethylene glycol (MiraLax) 17 g packet, Take 17 g by mouth Daily., Disp: 30 each, Rfl: 3  •  promethazine (PHENERGAN) 25 MG tablet, Take 1 tablet by mouth Every 6 (Six) Hours As Needed for Nausea or Vomiting., Disp: 21 tablet, Rfl: 2  •  promethazine-dextromethorphan (PROMETHAZINE-DM) 6.25-15 MG/5ML syrup, Take 5 mL by mouth 2 (Two) Times a Day As Needed for Cough., Disp: 180 mL, Rfl: 1  •  senna (Senokot) 8.6 MG tablet, Take 2 tablets by mouth Daily., Disp: 60 tablet, Rfl: 3  •  tamsulosin (FLOMAX) 0.4 MG capsule 24 hr capsule, Take 1 capsule by mouth Daily., Disp: 30 capsule, Rfl: 0  •  vitamin D (ERGOCALCIFEROL) 1.25 MG (04818 [facility]) capsule capsule, Take 50,000 Units by mouth 1 (One) Time Per Week. Prior to [facility] Admission, Patient was on:  takes on saturday, Disp: , Rfl:         Review of Systems  Pertinent positives are listed as per history of present of illness, all other systems reviewed and are negative. No significant complaints today.        Physical Exam  Vital Signs: These were reviewed and listed as per patient’s electronic medical chart  Vitals:    06/09/22 1034   BP: (!) 123/104   Pulse: 96   Resp: 18   Temp: 98.4 °F (36.9 °C)   SpO2: 97%     General: Patient is awake, alert, and in no acute distress.  Head: Normocephalic, atraumatic  Eyes: EOMI. Conjunctivae and sclerae normal.  Ears: Ears appear intact with no abnormalities noted.   Neck: Trachea midline. No obvious JVD.  Lungs: Respirations appear to be regular, even and unlabored with no signs of  respiratory distress. No audible wheezing.  Abdomen: No obvious abdominal distension.  MS: Muscle tone appears normal. No gross deformities.  Extremities: No clubbing, cyanosis or edema noted.  Skin: No visible bleeding, bruising, or rash.  Neurologic: Alert and oriented x3. No gross focal deficits.  MSK: In a wheelchair, glucose monitoring device on left UE, old blood no active bleeding.           Pain Score:  Pain Score    06/09/22 1034   PainSc:   8       PHQ-Score Total:  PHQ-9 Total Score:          Labs / Studies:                     Admission on 06/02/2022, Discharged on 06/02/2022   Component Date Value   • Glucose 06/02/2022 204 (A)   • BUN 06/02/2022 22 (A)   • Creatinine 06/02/2022 0.71    • Sodium 06/02/2022 133 (A)   • Potassium 06/02/2022 4.2    • Chloride 06/02/2022 97 (A)   • CO2 06/02/2022 20.9 (A)   • Calcium 06/02/2022 9.8    • Total Protein 06/02/2022 8.3    • Albumin 06/02/2022 4.70    • ALT (SGPT) 06/02/2022 21    • AST (SGOT) 06/02/2022 27    • Alkaline Phosphatase 06/02/2022 118 (A)   • Total Bilirubin 06/02/2022 0.5    • Globulin 06/02/2022 3.6    • A/G Ratio 06/02/2022 1.3    • BUN/Creatinine Ratio 06/02/2022 31.0 (A)   • Anion Gap 06/02/2022 15.1 (A)   • eGFR 06/02/2022 113.2    • Color, UA 06/02/2022 Yellow    • Appearance, UA 06/02/2022 Clear    • pH, UA 06/02/2022 5.5    • Specific Graham, UA 06/02/2022 1.019    • Glucose, UA 06/02/2022 100 mg/dL (Trace) (A)   • Ketones, UA 06/02/2022 Negative    • Bilirubin, UA 06/02/2022 Negative    • Blood, UA 06/02/2022 Large (3+) (A)   • Protein, UA 06/02/2022 30 mg/dL (1+) (A)   • Leuk Esterase, UA 06/02/2022 Moderate (2+) (A)   • Nitrite, UA 06/02/2022 Negative    • Urobilinogen, UA 06/02/2022 0.2 E.U./dL    • C-Reactive Protein 06/02/2022 1.53 (A)   • WBC 06/02/2022 5.52    • RBC 06/02/2022 3.91    • Hemoglobin 06/02/2022 12.7    • Hematocrit 06/02/2022 35.6    • MCV 06/02/2022 91.0    • MCH 06/02/2022 32.5    • MCHC 06/02/2022 35.7    • RDW  06/02/2022 12.5    • RDW-SD 06/02/2022 41.0    • MPV 06/02/2022 8.3    • Platelets 06/02/2022 241    • Scan Slide 06/02/2022     • RBC, UA 06/02/2022 21-30 (A)   • WBC, UA 06/02/2022 21-30 (A)   • Bacteria, UA 06/02/2022 None Seen    • Squamous Epithelial Cell* 06/02/2022 3-6 (A)   • Yeast, UA 06/02/2022 Small/1+ Budding Yeast    • Hyaline Casts, UA 06/02/2022 None Seen    • Methodology 06/02/2022 Manual Light Microscopy    • Neutrophil % 06/02/2022 44.0    • Lymphocyte % 06/02/2022 42.0    • Monocyte % 06/02/2022 11.0    • Eosinophil % 06/02/2022 3.0    • Neutrophils Absolute 06/02/2022 2.43    • Lymphocytes Absolute 06/02/2022 2.32    • Monocytes Absolute 06/02/2022 0.61    • Eosinophils Absolute 06/02/2022 0.17    • RBC Morphology 06/02/2022 Normal    • Platelet Morphology 06/02/2022 Normal    • Urine Culture 06/02/2022 50,000 CFU/mL Normal Urogenital Margo    Office Visit on 05/26/2022   Component Date Value   • AntiThromb III Func 05/26/2022 142 (A)   • Antithrombin Antigen 05/26/2022 110    • Protein C-Functional 05/26/2022 196 (A)   • Protein S-Functional 05/26/2022 86    • PTT Lupus Anticoagulant 05/26/2022 33.0    • Dilute Viper Venom Time 05/26/2022 37.0    • Lupus Anticoagulant Refl* 05/26/2022 Comment:    • Beta-2 Glyco 1 IgG 05/26/2022 <9    • Beta-2 Glyco 1 IgA 05/26/2022 <9    • Beta-2 Glyco 1 IgM 05/26/2022 <9    • Anticardiolipin IgG 05/26/2022 <9    • Anticardiolipin IgM 05/26/2022 <9    • WBC 05/26/2022 4.65    • RBC 05/26/2022 3.63 (A)   • Hemoglobin 05/26/2022 11.7 (A)   • Hematocrit 05/26/2022 32.9 (A)   • MCV 05/26/2022 90.6    • MCH 05/26/2022 32.2    • MCHC 05/26/2022 35.6    • RDW 05/26/2022 13.0    • RDW-SD 05/26/2022 42.4    • MPV 05/26/2022 8.4    • Platelets 05/26/2022 229    • Neutrophil % 05/26/2022 50.0    • Lymphocyte % 05/26/2022 37.0    • Monocyte % 05/26/2022 9.0    • Eosinophil % 05/26/2022 3.0    • Bands %  05/26/2022 1.0    • Neutrophils Absolute 05/26/2022 2.37    •  Lymphocytes Absolute 05/26/2022 1.72    • Monocytes Absolute 05/26/2022 0.42    • Eosinophils Absolute 05/26/2022 0.14    • RBC Morphology 05/26/2022 Normal    • Platelet Morphology 05/26/2022 Normal    Office Visit on 05/24/2022   Component Date Value   • Color 05/24/2022 Yellow    • Clarity, UA 05/24/2022 Slightly Cloudy (A)   • Specific Gravity  05/24/2022 1.020    • pH, Urine 05/24/2022 6.0    • Leukocytes 05/24/2022 Large (3+) (A)   • Nitrite, UA 05/24/2022 Negative    • Protein, POC 05/24/2022 Trace (A)   • Glucose, UA 05/24/2022 Negative    • Ketones, UA 05/24/2022 Negative    • Urobilinogen, UA 05/24/2022 Normal    • Bilirubin 05/24/2022 Negative    • Blood, UA 05/24/2022 3+ (A)   • Lot Number 05/24/2022 [id number]    • Expiration Date 05/24/2022 11-24-23    • Urine Culture 05/24/2022 25,000 CFU/mL Mixed Margo Isolated    Admission on 05/12/2022, Discharged on 05/12/2022   Component Date Value   • Glucose 05/12/2022 201 (A)   • BUN 05/12/2022 23 (A)   • Creatinine 05/12/2022 0.75    • Sodium 05/12/2022 136    • Potassium 05/12/2022 4.7    • Chloride 05/12/2022 100    • CO2 05/12/2022 21.8 (A)   • Calcium 05/12/2022 9.9    • Total Protein 05/12/2022 8.0    • Albumin 05/12/2022 4.34    • ALT (SGPT) 05/12/2022 21    • AST (SGOT) 05/12/2022 19    • Alkaline Phosphatase 05/12/2022 120 (A)   • Total Bilirubin 05/12/2022 0.4    • Globulin 05/12/2022 3.7    • A/G Ratio 05/12/2022 1.2    • BUN/Creatinine Ratio 05/12/2022 30.7 (A)   • Anion Gap 05/12/2022 14.2    • eGFR 05/12/2022 106.0    • Lipase 05/12/2022 29    • Lactate 05/12/2022 3.1 (A)   • HCG Qualitative 05/12/2022 Negative    • Extra Tube 05/12/2022 Hold for add-ons.    • Extra Tube 05/12/2022 hold for add-on    • Extra Tube 05/12/2022 Hold for add-ons.    • WBC 05/12/2022 5.50    • RBC 05/12/2022 3.91    • Hemoglobin 05/12/2022 12.5    • Hematocrit 05/12/2022 35.5    • MCV 05/12/2022 90.8    • MCH 05/12/2022 32.0    • MCHC 05/12/2022 35.2    • RDW  05/12/2022 13.5    • RDW-SD 05/12/2022 44.5    • MPV 05/12/2022 8.7    • Platelets 05/12/2022 237    • Scan Slide 05/12/2022     • Neutrophil % 05/12/2022 60.0    • Lymphocyte % 05/12/2022 26.0    • Monocyte % 05/12/2022 12.0    • Eosinophil % 05/12/2022 1.0    • Basophil % 05/12/2022 1.0    • Neutrophils Absolute 05/12/2022 3.30    • Lymphocytes Absolute 05/12/2022 1.43    • Monocytes Absolute 05/12/2022 0.66    • Eosinophils Absolute 05/12/2022 0.06    • Basophils Absolute 05/12/2022 0.06    • RBC Morphology 05/12/2022 Normal    • Platelet Estimate 05/12/2022 Adequate    • Lactate 05/12/2022 3.1 (A)   • Color, UA 05/12/2022 Yellow    • Appearance, UA 05/12/2022 Clear    • pH, UA 05/12/2022 5.5    • Specific Gravity, UA 05/12/2022 1.020    • Glucose, UA 05/12/2022 250 mg/dL (1+) (A)   • Ketones, UA 05/12/2022 Negative    • Bilirubin, UA 05/12/2022 Negative    • Blood, UA 05/12/2022 Moderate (2+) (A)   • Protein, UA 05/12/2022 30 mg/dL (1+) (A)   • Leuk Esterase, UA 05/12/2022 Small (1+) (A)   • Nitrite, UA 05/12/2022 Negative    • Urobilinogen, UA 05/12/2022 0.2 E.U./dL    • RBC, UA 05/12/2022 3-5 (A)   • WBC, UA 05/12/2022 31-50 (A)   • Bacteria, UA 05/12/2022 Trace (A)   • Squamous Epithelial Cell* 05/12/2022 0-2    • Yeast, UA 05/12/2022 Small/1+ Budding Yeast    • Hyaline Casts, UA 05/12/2022 None Seen    • Methodology 05/12/2022 Manual Light Microscopy    Admission on 05/08/2022, Discharged on 05/08/2022   Component Date Value   • Color, UA 05/08/2022 Yellow    • Appearance, UA 05/08/2022 Clear    • pH, UA 05/08/2022 <=5.0    • Specific Gravity, UA 05/08/2022 1.019    • Glucose, UA 05/08/2022 100 mg/dL (Trace) (A)   • Ketones, UA 05/08/2022 Negative    • Bilirubin, UA 05/08/2022 Negative    • Blood, UA 05/08/2022 Small (1+) (A)   • Protein, UA 05/08/2022 30 mg/dL (1+) (A)   • Leuk Esterase, UA 05/08/2022 Small (1+) (A)   • Nitrite, UA 05/08/2022 Negative    • Urobilinogen, UA 05/08/2022 0.2 E.U./dL    • Extra  Tube 05/08/2022 Hold for add-ons.    • Extra Tube 05/08/2022 hold for add-on    • Extra Tube 05/08/2022 Hold for add-ons.    • Extra Tube 05/08/2022 hold for add-on    • Glucose 05/08/2022 208 (A)   • BUN 05/08/2022 19    • Creatinine 05/08/2022 0.70    • Sodium 05/08/2022 133 (A)   • Potassium 05/08/2022 4.2    • Chloride 05/08/2022 97 (A)   • CO2 05/08/2022 19.4 (A)   • Calcium 05/08/2022 9.7    • Total Protein 05/08/2022 7.7    • Albumin 05/08/2022 4.31    • ALT (SGPT) 05/08/2022 18    • AST (SGOT) 05/08/2022 18    • Alkaline Phosphatase 05/08/2022 118 (A)   • Total Bilirubin 05/08/2022 0.4    • Globulin 05/08/2022 3.4    • A/G Ratio 05/08/2022 1.3    • BUN/Creatinine Ratio 05/08/2022 27.1 (A)   • Anion Gap 05/08/2022 16.6 (A)   • eGFR 05/08/2022 115.1    • Lipase 05/08/2022 28    • WBC 05/08/2022 5.50    • RBC 05/08/2022 3.92    • Hemoglobin 05/08/2022 12.6    • Hematocrit 05/08/2022 35.9    • MCV 05/08/2022 91.6    • MCH 05/08/2022 32.1    • MCHC 05/08/2022 35.1    • RDW 05/08/2022 13.9    • RDW-SD 05/08/2022 46.3    • MPV 05/08/2022 8.6    • Platelets 05/08/2022 221    • Neutrophil % 05/08/2022 56.4    • Lymphocyte % 05/08/2022 32.2    • Monocyte % 05/08/2022 8.9    • Eosinophil % 05/08/2022 1.6    • Basophil % 05/08/2022 0.4    • Immature Grans % 05/08/2022 0.5    • Neutrophils, Absolute 05/08/2022 3.10    • Lymphocytes, Absolute 05/08/2022 1.77    • Monocytes, Absolute 05/08/2022 0.49    • Eosinophils, Absolute 05/08/2022 0.09    • Basophils, Absolute 05/08/2022 0.02    • Immature Grans, Absolute 05/08/2022 0.03    • nRBC 05/08/2022 0.0    • RBC, UA 05/08/2022 6-12 (A)   • WBC, UA 05/08/2022 31-50 (A)   • Bacteria, UA 05/08/2022 None Seen    • Squamous Epithelial Cell* 05/08/2022 3-6 (A)   • Hyaline Casts, UA 05/08/2022 None Seen    • Methodology 05/08/2022 Automated Microscopy    • Urine Culture 05/08/2022 No growth    Office Visit on 04/28/2022   Component Date Value   • Glucose 04/28/2022 162 (A)   • BUN  04/28/2022 20    • Creatinine 04/28/2022 0.74    • Sodium 04/28/2022 136    • Potassium 04/28/2022 4.4    • Chloride 04/28/2022 100    • CO2 04/28/2022 21.4 (A)   • Calcium 04/28/2022 9.6    • Total Protein 04/28/2022 7.5    • Albumin 04/28/2022 3.90    • ALT (SGPT) 04/28/2022 19    • AST (SGOT) 04/28/2022 17    • Alkaline Phosphatase 04/28/2022 107    • Total Bilirubin 04/28/2022 0.5    • Globulin 04/28/2022 3.6    • A/G Ratio 04/28/2022 1.1    • BUN/Creatinine Ratio 04/28/2022 27.0 (A)   • Anion Gap 04/28/2022 14.6    • eGFR 04/28/2022 107.7    • 25 Hydroxy, Vitamin D 04/28/2022 12.4 (A)   • Vitamin B-12 04/28/2022 159 (A)   • Uric Acid 04/28/2022 7.8 (A)   Admission on 04/27/2022, Discharged on 04/27/2022   Component Date Value   • Glucose 04/27/2022 214 (A)   • Glucose 04/27/2022 182 (A)   Lab on 04/25/2022   Component Date Value   • COVID19 04/25/2022 Not Detected    Admission on 04/16/2022, Discharged on 04/16/2022   Component Date Value   • Glucose 04/16/2022 219 (A)   • BUN 04/16/2022 19    • Creatinine 04/16/2022 0.66    • Sodium 04/16/2022 131 (A)   • Potassium 04/16/2022 4.2    • Chloride 04/16/2022 96 (A)   • CO2 04/16/2022 20.2 (A)   • Calcium 04/16/2022 9.4    • Total Protein 04/16/2022 7.2    • Albumin 04/16/2022 3.98    • ALT (SGPT) 04/16/2022 18    • AST (SGOT) 04/16/2022 16    • Alkaline Phosphatase 04/16/2022 99    • Total Bilirubin 04/16/2022 0.3    • Globulin 04/16/2022 3.2    • A/G Ratio 04/16/2022 1.2    • BUN/Creatinine Ratio 04/16/2022 28.8 (A)   • Anion Gap 04/16/2022 14.8    • eGFR 04/16/2022 116.8    • Color, UA 04/16/2022 Yellow    • Appearance, UA 04/16/2022 Cloudy (A)   • pH, UA 04/16/2022 <=5.0    • Specific Gravity, UA 04/16/2022 1.016    • Glucose, UA 04/16/2022 100 mg/dL (Trace) (A)   • Ketones, UA 04/16/2022 Negative    • Bilirubin, UA 04/16/2022 Negative    • Blood, UA 04/16/2022 Moderate (2+) (A)   • Protein, UA 04/16/2022 30 mg/dL (1+) (A)   • Leuk Esterase, UA 04/16/2022 Large  (3+) (A)   • Nitrite, UA 04/16/2022 Negative    • Urobilinogen, UA 04/16/2022 0.2 E.U./dL    • WBC 04/16/2022 4.85    • RBC 04/16/2022 3.64 (A)   • Hemoglobin 04/16/2022 11.7 (A)   • Hematocrit 04/16/2022 34.2    • MCV 04/16/2022 94.0    • MCH 04/16/2022 32.1    • MCHC 04/16/2022 34.2    • RDW 04/16/2022 13.8    • RDW-SD 04/16/2022 46.7    • MPV 04/16/2022 8.5    • Platelets 04/16/2022 224    • Neutrophil % 04/16/2022 50.1    • Lymphocyte % 04/16/2022 35.9    • Monocyte % 04/16/2022 9.7    • Eosinophil % 04/16/2022 3.3    • Basophil % 04/16/2022 0.6    • Immature Grans % 04/16/2022 0.4    • Neutrophils, Absolute 04/16/2022 2.43    • Lymphocytes, Absolute 04/16/2022 1.74    • Monocytes, Absolute 04/16/2022 0.47    • Eosinophils, Absolute 04/16/2022 0.16    • Basophils, Absolute 04/16/2022 0.03    • Immature Grans, Absolute 04/16/2022 0.02    • nRBC 04/16/2022 0.0    • RBC, UA 04/16/2022 6-12 (A)   • WBC, UA 04/16/2022 Too Numerous to Count (A)   • Bacteria, UA 04/16/2022 None Seen    • Squamous Epithelial Cell* 04/16/2022 0-2    • Yeast, UA 04/16/2022 Moderate/2+ Budding Yeast    • Hyaline Casts, UA 04/16/2022 None Seen    • Methodology 04/16/2022 Manual Light Microscopy    • Urine Culture 04/16/2022 >100,000 CFU/mL Mixed Margo Isolated    Admission on 04/12/2022, Discharged on 04/12/2022   Component Date Value   • Glucose 04/12/2022 222 (A)   • BUN 04/12/2022 17    • Creatinine 04/12/2022 0.65    • Sodium 04/12/2022 131 (A)   • Potassium 04/12/2022 4.1    • Chloride 04/12/2022 97 (A)   • CO2 04/12/2022 20.3 (A)   • Calcium 04/12/2022 9.5    • Total Protein 04/12/2022 7.4    • Albumin 04/12/2022 3.97    • ALT (SGPT) 04/12/2022 23    • AST (SGOT) 04/12/2022 18    • Alkaline Phosphatase 04/12/2022 103    • Total Bilirubin 04/12/2022 0.2    • Globulin 04/12/2022 3.4    • A/G Ratio 04/12/2022 1.2    • BUN/Creatinine Ratio 04/12/2022 26.2 (A)   • Anion Gap 04/12/2022 13.7    • eGFR 04/12/2022 117.2    • Color, UA  04/12/2022 Yellow    • Appearance, UA 04/12/2022 Cloudy (A)   • pH, UA 04/12/2022 <=5.0    • Specific Gravity, UA 04/12/2022 1.015    • Glucose, UA 04/12/2022 100 mg/dL (Trace) (A)   • Ketones, UA 04/12/2022 Negative    • Bilirubin, UA 04/12/2022 Negative    • Blood, UA 04/12/2022 Small (1+) (A)   • Protein, UA 04/12/2022 30 mg/dL (1+) (A)   • Leuk Esterase, UA 04/12/2022 Moderate (2+) (A)   • Nitrite, UA 04/12/2022 Negative    • Urobilinogen, UA 04/12/2022 0.2 E.U./dL    • WBC 04/12/2022 5.69    • RBC 04/12/2022 3.59 (A)   • Hemoglobin 04/12/2022 11.7 (A)   • Hematocrit 04/12/2022 33.5 (A)   • MCV 04/12/2022 93.3    • MCH 04/12/2022 32.6    • MCHC 04/12/2022 34.9    • RDW 04/12/2022 13.7    • RDW-SD 04/12/2022 45.7    • MPV 04/12/2022 8.5    • Platelets 04/12/2022 228    • C-Reactive Protein 04/12/2022 0.53 (A)   • Lactate 04/12/2022 4.3 (A)   • Scan Slide 04/12/2022     • RBC, UA 04/12/2022 6-12 (A)   • WBC, UA 04/12/2022 Too Numerous to Count (A)   • Bacteria, UA 04/12/2022 None Seen    • Squamous Epithelial Cell* 04/12/2022 0-2    • Yeast, UA 04/12/2022 Small/1+ Budding Yeast    • Hyaline Casts, UA 04/12/2022 None Seen    • Methodology 04/12/2022 Manual Light Microscopy    • Neutrophil % 04/12/2022 55.0    • Lymphocyte % 04/12/2022 37.5    • Monocyte % 04/12/2022 5.5    • Eosinophil % 04/12/2022 1.0    • Basophil % 04/12/2022 1.0    • Neutrophils Absolute 04/12/2022 3.13    • Lymphocytes Absolute 04/12/2022 2.13    • Monocytes Absolute 04/12/2022 0.31    • Eosinophils Absolute 04/12/2022 0.06    • Basophils Absolute 04/12/2022 0.06    • RBC Morphology 04/12/2022 Normal    • Platelet Morphology 04/12/2022 Normal    • Urine Culture 04/12/2022 >100,000 CFU/mL Mixed Margo Isolated    • Blood Culture 04/12/2022 No growth at 5 days    • Blood Culture 04/12/2022 No growth at 5 days    There may be more visits with results that are not included.            Assessment & Plan   Natalia Arzate is a very pleasant 37 y.o.   female who presents in follow up appointment for further management and evaluation of heterozygous Factor V Leiden mutation.     Heterozygous Factor V Leiden mutation  Heterozygous [id number] MTHFR mutation  - At present she is on anticoagulation and has a filter in place will continue. Patient was previously evaluated by Dr. De Dios after she was found to have a provoked DVT and bilateral PE after hormone therapy. She underwent workup and was found to be heterozygous for the FVL mutation and heterozygous [id number] MTHFR mutation. Given the provoked nature it was recommended that she continue anticoagulation for one year in which she was taking Coumadin.   - Will repeat homocysteine level today and is pending. Antithrombin panel shows an elevated activity level with normal antigen level. This is likely due to Eliquis use and carry no clinical significance. Protein S activity level is normal, while Protein C is elevated and likely secondary to Eliquis use. Antiphospholipid studies ar negative for lupus anticoagulant and beta 2 glycoprotein and anticardiolipin antibodies are normal. Protein C & S Antigen levels are pending.   - Regardless of workup would recommend life long anticoagulation given her history of recurrent thrombosis. She also carries a history significant for atrial fibrillation and as per patientNeurology also recommended ongoing anticoagulation unless there is a contraindicated. If she develops thrombosis while on Eliquis would than recommend Coumadin.  - I discussed the risks and benefits as well as the bleeding risk profile with each anticoagulation treatment (such as Warfarin, Xarelto, Eliquis) and the risk of bleeding that can occur while being on any anticoagulation. She understands that should spontaneous or abnormal bleeding occur she must seek immediate medical attention. I also advised her against high impact sports/activities while on anticoagulation that would place him at increased risk  of bleeding. No NSAID use while on treatment as this can increase the risk of bleeding.  - I have also advised that she obtain a medical band while on anticoagulation so that this would alert paramedics, medical personal, etc that she is on anticoagulation should she ever require medical attention.    Recurrent UTIs and nephrolithiasis  - She follows with Dr. Motley and will contact them for further evaluation.     ACO / CHERELLE/Other  Quality measures  -  Natalia Arzate  reports that she has never smoked. She has never used smokeless tobacco.  -  Natalia Arzate received 2021 flu vaccine.  -  Natalia Arzate reports a pain score of 8. Given her pain assessment as noted, treatment options were discussed and the following options were decided upon as a follow-up plan to address the patient's pain: referral to Primary Care for assistance in pain treatment guidance.  -  Current outpatient and discharge medications have been reconciled for the patient.  Reviewed by: Alison Constantino MD      I will have the patient return in follow up appointment in six months for follow up. She understands that should she have any questions or concerns prior to her appointment she should give us a call at any time and I would be happy to see her sooner. It was a pleasure to see this patient in clinic today, thank you for allowing me to participate in the care of this patient.          Alison Constantino MD  06/09/22   11:16 EDT

## 2022-06-10 ENCOUNTER — HOSPITAL ENCOUNTER (EMERGENCY)
Facility: HOSPITAL | Age: 37
Discharge: HOME | End: 2022-06-10
Attending: EMERGENCY MEDICINE | Admitting: EMERGENCY MEDICINE

## 2022-06-10 ENCOUNTER — APPOINTMENT (OUTPATIENT)
Dept: ULTRASOUND IMAGING | Facility: HOSPITAL | Age: 37
End: 2022-06-10

## 2022-06-10 ENCOUNTER — APPOINTMENT (OUTPATIENT)
Dept: CT IMAGING | Facility: HOSPITAL | Age: 37
End: 2022-06-10

## 2022-06-10 VITALS
OXYGEN SATURATION: 99 % | DIASTOLIC BLOOD PRESSURE: 95 MMHG | HEART RATE: 101 BPM | WEIGHT: 250 LBS | TEMPERATURE: 97.9 F | SYSTOLIC BLOOD PRESSURE: 151 MMHG | BODY MASS INDEX: 42.68 KG/M2 | RESPIRATION RATE: 20 BRPM | HEIGHT: 64 IN

## 2022-06-10 DIAGNOSIS — R10.9 ABDOMINAL PAIN, UNSPECIFIED ABDOMINAL LOCATION: Primary | ICD-10-CM

## 2022-06-10 LAB
ALBUMIN SERPL-MCNC: 4.28 G/DL (ref 3.5–5.2)
ALBUMIN/GLOB SERPL: 1.2 G/DL
ALP SERPL-CCNC: 132 U/L (ref 39–117)
ALT SERPL W P-5'-P-CCNC: 29 U/L (ref 1–33)
ANION GAP SERPL CALCULATED.3IONS-SCNC: 18.9 MMOL/L (ref 5–15)
AST SERPL-CCNC: 32 U/L (ref 1–32)
B-HCG UR QL: NEGATIVE
BACTERIA UR QL AUTO: ABNORMAL /HPF
BASOPHILS # BLD AUTO: 0.03 10*3/MM3 (ref 0–0.2)
BASOPHILS NFR BLD AUTO: 0.6 % (ref 0–1.5)
BILIRUB SERPL-MCNC: 0.4 MG/DL (ref 0–1.2)
BILIRUB UR QL STRIP: NEGATIVE
BUN SERPL-MCNC: 23 MG/DL (ref 6–20)
BUN/CREAT SERPL: 27.7 (ref 7–25)
CALCIUM SPEC-SCNC: 9.9 MG/DL (ref 8.6–10.5)
CHLORIDE SERPL-SCNC: 95 MMOL/L (ref 98–107)
CLARITY UR: CLEAR
CO2 SERPL-SCNC: 18.1 MMOL/L (ref 22–29)
COLOR UR: YELLOW
CREAT SERPL-MCNC: 0.83 MG/DL (ref 0.57–1)
DEPRECATED RDW RBC AUTO: 39.8 FL (ref 37–54)
EGFRCR SERPLBLD CKD-EPI 2021: 93.8 ML/MIN/1.73
EOSINOPHIL # BLD AUTO: 0.12 10*3/MM3 (ref 0–0.4)
EOSINOPHIL NFR BLD AUTO: 2.2 % (ref 0.3–6.2)
ERYTHROCYTE [DISTWIDTH] IN BLOOD BY AUTOMATED COUNT: 12.3 % (ref 12.3–15.4)
GLOBULIN UR ELPH-MCNC: 3.7 GM/DL
GLUCOSE SERPL-MCNC: 368 MG/DL (ref 65–99)
GLUCOSE UR STRIP-MCNC: ABNORMAL MG/DL
HCT VFR BLD AUTO: 34.8 % (ref 34–46.6)
HGB BLD-MCNC: 12.2 G/DL (ref 12–15.9)
HGB UR QL STRIP.AUTO: NEGATIVE
HYALINE CASTS UR QL AUTO: ABNORMAL /LPF
IMM GRANULOCYTES # BLD AUTO: 0.03 10*3/MM3 (ref 0–0.05)
IMM GRANULOCYTES NFR BLD AUTO: 0.6 % (ref 0–0.5)
KETONES UR QL STRIP: NEGATIVE
LEUKOCYTE ESTERASE UR QL STRIP.AUTO: ABNORMAL
LYMPHOCYTES # BLD AUTO: 1.25 10*3/MM3 (ref 0.7–3.1)
LYMPHOCYTES NFR BLD AUTO: 23 % (ref 19.6–45.3)
MCH RBC QN AUTO: 31.9 PG (ref 26.6–33)
MCHC RBC AUTO-ENTMCNC: 35.1 G/DL (ref 31.5–35.7)
MCV RBC AUTO: 91.1 FL (ref 79–97)
MONOCYTES # BLD AUTO: 0.35 10*3/MM3 (ref 0.1–0.9)
MONOCYTES NFR BLD AUTO: 6.4 % (ref 5–12)
NEUTROPHILS NFR BLD AUTO: 3.65 10*3/MM3 (ref 1.7–7)
NEUTROPHILS NFR BLD AUTO: 67.2 % (ref 42.7–76)
NITRITE UR QL STRIP: NEGATIVE
NRBC BLD AUTO-RTO: 0 /100 WBC (ref 0–0.2)
PH UR STRIP.AUTO: 6 [PH] (ref 5–8)
PLATELET # BLD AUTO: 226 10*3/MM3 (ref 140–450)
PMV BLD AUTO: 8.9 FL (ref 6–12)
POTASSIUM SERPL-SCNC: 4.8 MMOL/L (ref 3.5–5.2)
PROT SERPL-MCNC: 8 G/DL (ref 6–8.5)
PROT UR QL STRIP: ABNORMAL
RBC # BLD AUTO: 3.82 10*6/MM3 (ref 3.77–5.28)
RBC # UR STRIP: ABNORMAL /HPF
REF LAB TEST METHOD: ABNORMAL
SODIUM SERPL-SCNC: 132 MMOL/L (ref 136–145)
SP GR UR STRIP: 1.02 (ref 1–1.03)
SQUAMOUS #/AREA URNS HPF: ABNORMAL /HPF
UROBILINOGEN UR QL STRIP: ABNORMAL
WBC # UR STRIP: ABNORMAL /HPF
WBC NRBC COR # BLD: 5.43 10*3/MM3 (ref 3.4–10.8)

## 2022-06-10 PROCEDURE — 85025 COMPLETE CBC W/AUTO DIFF WBC: CPT | Performed by: NURSE PRACTITIONER

## 2022-06-10 PROCEDURE — 76856 US EXAM PELVIC COMPLETE: CPT

## 2022-06-10 PROCEDURE — 87086 URINE CULTURE/COLONY COUNT: CPT | Performed by: NURSE PRACTITIONER

## 2022-06-10 PROCEDURE — 96372 THER/PROPH/DIAG INJ SC/IM: CPT

## 2022-06-10 PROCEDURE — 87077 CULTURE AEROBIC IDENTIFY: CPT | Performed by: NURSE PRACTITIONER

## 2022-06-10 PROCEDURE — 80053 COMPREHEN METABOLIC PANEL: CPT | Performed by: NURSE PRACTITIONER

## 2022-06-10 PROCEDURE — 63710000001 PROMETHAZINE PER 25 MG: Performed by: NURSE PRACTITIONER

## 2022-06-10 PROCEDURE — 87186 SC STD MICRODIL/AGAR DIL: CPT | Performed by: NURSE PRACTITIONER

## 2022-06-10 PROCEDURE — 81001 URINALYSIS AUTO W/SCOPE: CPT | Performed by: NURSE PRACTITIONER

## 2022-06-10 PROCEDURE — 99283 EMERGENCY DEPT VISIT LOW MDM: CPT

## 2022-06-10 PROCEDURE — 81025 URINE PREGNANCY TEST: CPT | Performed by: NURSE PRACTITIONER

## 2022-06-10 PROCEDURE — 36415 COLL VENOUS BLD VENIPUNCTURE: CPT

## 2022-06-10 PROCEDURE — 76856 US EXAM PELVIC COMPLETE: CPT | Performed by: RADIOLOGY

## 2022-06-10 PROCEDURE — 25010000002 HYDROMORPHONE 1 MG/ML SOLUTION: Performed by: NURSE PRACTITIONER

## 2022-06-10 PROCEDURE — 74176 CT ABD & PELVIS W/O CONTRAST: CPT

## 2022-06-10 RX ORDER — SODIUM CHLORIDE 0.9 % (FLUSH) 0.9 %
10 SYRINGE (ML) INJECTION AS NEEDED
Status: DISCONTINUED | OUTPATIENT
Start: 2022-06-10 | End: 2022-06-10

## 2022-06-10 RX ORDER — OXYCODONE HYDROCHLORIDE AND ACETAMINOPHEN 5; 325 MG/1; MG/1
1 TABLET ORAL ONCE
Status: COMPLETED | OUTPATIENT
Start: 2022-06-10 | End: 2022-06-10

## 2022-06-10 RX ORDER — PROMETHAZINE HYDROCHLORIDE 25 MG/1
25 TABLET ORAL ONCE
Status: COMPLETED | OUTPATIENT
Start: 2022-06-10 | End: 2022-06-10

## 2022-06-10 RX ADMIN — PROMETHAZINE HYDROCHLORIDE 25 MG: 25 TABLET ORAL at 14:23

## 2022-06-10 RX ADMIN — HYDROMORPHONE HYDROCHLORIDE 1 MG: 1 INJECTION, SOLUTION INTRAMUSCULAR; INTRAVENOUS; SUBCUTANEOUS at 16:58

## 2022-06-10 RX ADMIN — OXYCODONE HYDROCHLORIDE AND ACETAMINOPHEN 1 TABLET: 5; 325 TABLET ORAL at 19:16

## 2022-06-10 RX ADMIN — HYDROMORPHONE HYDROCHLORIDE 1 MG: 1 INJECTION, SOLUTION INTRAMUSCULAR; INTRAVENOUS; SUBCUTANEOUS at 14:23

## 2022-06-10 NOTE — ED PROVIDER NOTES
Subjective     Flank Pain  Pain quality: sharp    Pain radiates to:  Does not radiate  Pain severity:  Moderate  Onset quality:  Sudden  Duration:  1 day  Timing:  Constant  Progression:  Worsening  Chronicity:  New  Context: not alcohol use, not awakening from sleep, not medication withdrawal, not recent sexual activity, not sick contacts and not suspicious food intake    Relieved by:  Nothing  Worsened by:  Nothing  Ineffective treatments:  None tried  Associated symptoms: nausea    Associated symptoms: no anorexia, no chills, no diarrhea, no fatigue and no vaginal bleeding    Risk factors: no alcohol abuse, no aspirin use, has not had multiple surgeries and not pregnant        Review of Systems   Constitutional: Negative.  Negative for chills and fatigue.   HENT: Negative.    Eyes: Negative.    Respiratory: Negative.    Cardiovascular: Negative.    Gastrointestinal: Positive for nausea. Negative for anorexia and diarrhea.   Endocrine: Negative.    Genitourinary: Positive for flank pain. Negative for vaginal bleeding.   Skin: Negative.    Allergic/Immunologic: Negative.    Neurological: Negative.    Hematological: Negative.    Psychiatric/Behavioral: Negative.        Past Medical History:   Diagnosis Date   • A-fib (HCC)    • Abnormal ECG    • Anemia    • Anxiety    • Asthma    • Cancer (HCC)     Ovarian   • Depression    • Diabetes mellitus (HCC)    • DVT (deep venous thrombosis) (HCC)    • Factor 5 Leiden mutation, heterozygous (HCC)    • Fibroid    • GERD (gastroesophageal reflux disease)    • Gout    • H/O abdominal abscess    • History of sepsis    • History of transfusion    • Hyperlipidemia    • Hypothyroid    • Kidney stone    • Migraines    • Neuropathy    • Ovarian cancer (HCC) 02/2021   • Ovarian cyst    • PE (pulmonary embolism)    • Polycystic ovary syndrome    • Preeclampsia    • Rh incompatibility    • Stroke (HCC)    • TIA (transient ischemic attack)    • Urinary tract infection    • Varicella         Allergies   Allergen Reactions   • Toradol [Ketorolac Tromethamine] Anaphylaxis and Hives   • Haldol [Haloperidol] Hives and Mental Status Change   • Tramadol Hives and Swelling   • Amoxicillin Hives and Rash   • Penicillins Hives and Rash       Past Surgical History:   Procedure Laterality Date   • ABDOMINAL SURGERY     • CARDIAC CATHETERIZATION     •  SECTION     • CHOLECYSTECTOMY     • COLONOSCOPY     • ENDOSCOPY     • EXTRACORPOREAL SHOCK WAVE LITHOTRIPSY (ESWL) Left 10/22/2021    Procedure: EXTRACORPOREAL SHOCKWAVE LITHOTRIPSY;  Surgeon: Milan Motley MD;  Location: [facility];  Service: Urology;  Laterality: Left;   • LITHOTRIPSY     • RIGHT OOPHORECTOMY     • URETEROSCOPY LASER LITHOTRIPSY WITH STENT INSERTION Left 10/01/2021    Procedure: URETEROSCOPY WITH STENT PLACEMENT;  Surgeon: Milan Motley MD;  Location: [facility];  Service: Urology;  Laterality: Left;   • URETEROSCOPY LASER LITHOTRIPSY WITH STENT INSERTION Left 2022    Procedure: URETEROSCOPY STENT REMOVAL;  Surgeon: Milan Motley MD;  Location: [facility];  Service: Urology;  Laterality: Left;       Family History   Problem Relation Age of Onset   • Hypertension Mother    • Diabetes Father    • Hypertension Father    • Heart attack Father    • No Known Problems Sister    • No Known Problems Brother    • No Known Problems Son    • No Known Problems Daughter    • No Known Problems Maternal Grandmother    • No Known Problems Maternal Grandfather    • No Known Problems Paternal Grandmother    • No Known Problems Paternal Grandfather    • No Known Problems Cousin    • Rheum arthritis Neg Hx    • Osteoarthritis Neg Hx    • Asthma Neg Hx    • Heart failure Neg Hx    • Hyperlipidemia Neg Hx    • Migraines Neg Hx    • Rashes / Skin problems Neg Hx    • Seizures Neg Hx    • Stroke Neg Hx    • Thyroid disease Neg Hx        Social History     Socioeconomic History   • Marital status:    Tobacco Use   •  "Smoking status: Never Smoker   • Smokeless tobacco: Never Used   Vaping Use   • Vaping Use: Never used   Substance and Sexual Activity   • Alcohol use: No   • Drug use: Never     Comment: Pt has track marks on right arm. Pt states \"It's from my INR being drawn.\"    • Sexual activity: Not Currently     Partners: Male     Birth control/protection: None           Objective   Physical Exam  Vitals and nursing note reviewed.   Constitutional:       Appearance: She is well-developed.   HENT:      Head: Normocephalic.      Right Ear: External ear normal.      Left Ear: External ear normal.   Eyes:      Conjunctiva/sclera: Conjunctivae normal.      Pupils: Pupils are equal, round, and reactive to light.   Cardiovascular:      Rate and Rhythm: Normal rate and regular rhythm.      Heart sounds: Normal heart sounds.   Pulmonary:      Effort: Pulmonary effort is normal.      Breath sounds: Normal breath sounds.   Abdominal:      General: Bowel sounds are normal.      Palpations: Abdomen is soft.   Musculoskeletal:         General: Normal range of motion.      Cervical back: Normal range of motion and neck supple.   Skin:     General: Skin is warm and dry.      Capillary Refill: Capillary refill takes less than 2 seconds.   Neurological:      Mental Status: She is alert and oriented to person, place, and time.   Psychiatric:         Behavior: Behavior normal.         Thought Content: Thought content normal.         Procedures           ED Course                                                 MDM    Final diagnoses:   Abdominal pain, unspecified abdominal location       ED Disposition  ED Disposition     ED Disposition   Discharge    Condition   Stable    Comment   --             Eddie Latif, APRN  [address] [facility] [address]  [phone]    Schedule an appointment as soon as possible for a visit   For further evaluation         Medication List      No changes were made to your prescriptions during this visit.   "        Charly Fisher, APRN  06/12/22 1522

## 2022-06-12 LAB — BACTERIA SPEC AEROBE CULT: ABNORMAL

## 2022-06-15 ENCOUNTER — HOSPITAL ENCOUNTER (EMERGENCY)
Facility: HOSPITAL | Age: 37
Discharge: HOME | End: 2022-06-15
Attending: EMERGENCY MEDICINE | Admitting: EMERGENCY MEDICINE

## 2022-06-15 VITALS
SYSTOLIC BLOOD PRESSURE: 143 MMHG | RESPIRATION RATE: 18 BRPM | DIASTOLIC BLOOD PRESSURE: 102 MMHG | TEMPERATURE: 98.9 F | OXYGEN SATURATION: 97 % | BODY MASS INDEX: 44.39 KG/M2 | WEIGHT: 260 LBS | HEART RATE: 104 BPM | HEIGHT: 64 IN

## 2022-06-15 DIAGNOSIS — N30.01 ACUTE CYSTITIS WITH HEMATURIA: Primary | ICD-10-CM

## 2022-06-15 LAB
ALBUMIN SERPL-MCNC: 4.27 G/DL (ref 3.5–5.2)
ALBUMIN/GLOB SERPL: 1.1 G/DL
ALP SERPL-CCNC: 129 U/L (ref 39–117)
ALT SERPL W P-5'-P-CCNC: 29 U/L (ref 1–33)
AMPHET+METHAMPHET UR QL: NEGATIVE
AMPHETAMINES UR QL: NEGATIVE
ANION GAP SERPL CALCULATED.3IONS-SCNC: 18.2 MMOL/L (ref 5–15)
AST SERPL-CCNC: 34 U/L (ref 1–32)
B-HCG UR QL: NEGATIVE
BACTERIA UR QL AUTO: ABNORMAL /HPF
BARBITURATES UR QL SCN: NEGATIVE
BASOPHILS # BLD AUTO: 0.05 10*3/MM3 (ref 0–0.2)
BASOPHILS NFR BLD AUTO: 0.9 % (ref 0–1.5)
BENZODIAZ UR QL SCN: NEGATIVE
BILIRUB SERPL-MCNC: 0.4 MG/DL (ref 0–1.2)
BILIRUB UR QL STRIP: NEGATIVE
BUN SERPL-MCNC: 20 MG/DL (ref 6–20)
BUN/CREAT SERPL: 25.6 (ref 7–25)
BUPRENORPHINE SERPL-MCNC: NEGATIVE NG/ML
CALCIUM SPEC-SCNC: 10.2 MG/DL (ref 8.6–10.5)
CANNABINOIDS SERPL QL: NEGATIVE
CHLORIDE SERPL-SCNC: 97 MMOL/L (ref 98–107)
CLARITY UR: ABNORMAL
CO2 SERPL-SCNC: 19.8 MMOL/L (ref 22–29)
COCAINE UR QL: NEGATIVE
COLOR UR: YELLOW
CREAT SERPL-MCNC: 0.78 MG/DL (ref 0.57–1)
CRP SERPL-MCNC: 2.87 MG/DL (ref 0–0.5)
D-LACTATE SERPL-SCNC: 3.5 MMOL/L (ref 0.5–2)
D-LACTATE SERPL-SCNC: 3.7 MMOL/L (ref 0.5–2)
DEPRECATED RDW RBC AUTO: 40.9 FL (ref 37–54)
EGFRCR SERPLBLD CKD-EPI 2021: 101.1 ML/MIN/1.73
EOSINOPHIL # BLD AUTO: 0.14 10*3/MM3 (ref 0–0.4)
EOSINOPHIL NFR BLD AUTO: 2.4 % (ref 0.3–6.2)
ERYTHROCYTE [DISTWIDTH] IN BLOOD BY AUTOMATED COUNT: 12.7 % (ref 12.3–15.4)
GLOBULIN UR ELPH-MCNC: 3.8 GM/DL
GLUCOSE SERPL-MCNC: 308 MG/DL (ref 65–99)
GLUCOSE UR STRIP-MCNC: ABNORMAL MG/DL
HCT VFR BLD AUTO: 35 % (ref 34–46.6)
HGB BLD-MCNC: 12.4 G/DL (ref 12–15.9)
HGB UR QL STRIP.AUTO: ABNORMAL
HYALINE CASTS UR QL AUTO: ABNORMAL /LPF
IMM GRANULOCYTES # BLD AUTO: 0.02 10*3/MM3 (ref 0–0.05)
IMM GRANULOCYTES NFR BLD AUTO: 0.3 % (ref 0–0.5)
KETONES UR QL STRIP: NEGATIVE
LEUKOCYTE ESTERASE UR QL STRIP.AUTO: ABNORMAL
LYMPHOCYTES # BLD AUTO: 1.64 10*3/MM3 (ref 0.7–3.1)
LYMPHOCYTES NFR BLD AUTO: 28 % (ref 19.6–45.3)
MCH RBC QN AUTO: 32.2 PG (ref 26.6–33)
MCHC RBC AUTO-ENTMCNC: 35.4 G/DL (ref 31.5–35.7)
MCV RBC AUTO: 90.9 FL (ref 79–97)
METHADONE UR QL SCN: NEGATIVE
MONOCYTES # BLD AUTO: 0.57 10*3/MM3 (ref 0.1–0.9)
MONOCYTES NFR BLD AUTO: 9.7 % (ref 5–12)
NEUTROPHILS NFR BLD AUTO: 3.44 10*3/MM3 (ref 1.7–7)
NEUTROPHILS NFR BLD AUTO: 58.7 % (ref 42.7–76)
NITRITE UR QL STRIP: POSITIVE
NRBC BLD AUTO-RTO: 0 /100 WBC (ref 0–0.2)
OPIATES UR QL: NEGATIVE
OXYCODONE UR QL SCN: NEGATIVE
PCP UR QL SCN: NEGATIVE
PH UR STRIP.AUTO: 5.5 [PH] (ref 5–8)
PLATELET # BLD AUTO: 237 10*3/MM3 (ref 140–450)
PMV BLD AUTO: 8.6 FL (ref 6–12)
POTASSIUM SERPL-SCNC: 4.7 MMOL/L (ref 3.5–5.2)
PROPOXYPH UR QL: NEGATIVE
PROT SERPL-MCNC: 8.1 G/DL (ref 6–8.5)
PROT UR QL STRIP: ABNORMAL
RBC # BLD AUTO: 3.85 10*6/MM3 (ref 3.77–5.28)
RBC # UR STRIP: ABNORMAL /HPF
REF LAB TEST METHOD: ABNORMAL
SODIUM SERPL-SCNC: 135 MMOL/L (ref 136–145)
SP GR UR STRIP: 1.02 (ref 1–1.03)
SQUAMOUS #/AREA URNS HPF: ABNORMAL /HPF
TRICYCLICS UR QL SCN: NEGATIVE
UROBILINOGEN UR QL STRIP: ABNORMAL
WBC # UR STRIP: ABNORMAL /HPF
WBC NRBC COR # BLD: 5.86 10*3/MM3 (ref 3.4–10.8)
YEAST URNS QL MICRO: ABNORMAL /HPF

## 2022-06-15 PROCEDURE — 25010000002 CEFTRIAXONE PER 250 MG: Performed by: PHYSICIAN ASSISTANT

## 2022-06-15 PROCEDURE — 83605 ASSAY OF LACTIC ACID: CPT | Performed by: PHYSICIAN ASSISTANT

## 2022-06-15 PROCEDURE — 25010000002 ONDANSETRON PER 1 MG: Performed by: PHYSICIAN ASSISTANT

## 2022-06-15 PROCEDURE — 80306 DRUG TEST PRSMV INSTRMNT: CPT | Performed by: PHYSICIAN ASSISTANT

## 2022-06-15 PROCEDURE — 80053 COMPREHEN METABOLIC PANEL: CPT | Performed by: PHYSICIAN ASSISTANT

## 2022-06-15 PROCEDURE — 87040 BLOOD CULTURE FOR BACTERIA: CPT | Performed by: PHYSICIAN ASSISTANT

## 2022-06-15 PROCEDURE — 96375 TX/PRO/DX INJ NEW DRUG ADDON: CPT

## 2022-06-15 PROCEDURE — 86140 C-REACTIVE PROTEIN: CPT | Performed by: PHYSICIAN ASSISTANT

## 2022-06-15 PROCEDURE — 85025 COMPLETE CBC W/AUTO DIFF WBC: CPT | Performed by: PHYSICIAN ASSISTANT

## 2022-06-15 PROCEDURE — 96365 THER/PROPH/DIAG IV INF INIT: CPT

## 2022-06-15 PROCEDURE — 87186 SC STD MICRODIL/AGAR DIL: CPT | Performed by: PHYSICIAN ASSISTANT

## 2022-06-15 PROCEDURE — 99284 EMERGENCY DEPT VISIT MOD MDM: CPT

## 2022-06-15 PROCEDURE — 87086 URINE CULTURE/COLONY COUNT: CPT | Performed by: PHYSICIAN ASSISTANT

## 2022-06-15 PROCEDURE — 25010000002 MORPHINE PER 10 MG: Performed by: EMERGENCY MEDICINE

## 2022-06-15 PROCEDURE — 81001 URINALYSIS AUTO W/SCOPE: CPT | Performed by: PHYSICIAN ASSISTANT

## 2022-06-15 PROCEDURE — 81025 URINE PREGNANCY TEST: CPT | Performed by: PHYSICIAN ASSISTANT

## 2022-06-15 PROCEDURE — 96376 TX/PRO/DX INJ SAME DRUG ADON: CPT

## 2022-06-15 RX ORDER — ONDANSETRON 2 MG/ML
4 INJECTION INTRAMUSCULAR; INTRAVENOUS ONCE
Status: COMPLETED | OUTPATIENT
Start: 2022-06-15 | End: 2022-06-15

## 2022-06-15 RX ORDER — CEFDINIR 300 MG/1
300 CAPSULE ORAL 2 TIMES DAILY
Qty: 14 CAPSULE | Refills: 0 | Status: SHIPPED | OUTPATIENT
Start: 2022-06-15 | End: 2022-06-22

## 2022-06-15 RX ORDER — MORPHINE SULFATE 2 MG/ML
2 INJECTION, SOLUTION INTRAMUSCULAR; INTRAVENOUS ONCE
Status: COMPLETED | OUTPATIENT
Start: 2022-06-15 | End: 2022-06-15

## 2022-06-15 RX ORDER — CEFDINIR 300 MG/1
300 CAPSULE ORAL 2 TIMES DAILY
Qty: 14 CAPSULE | Refills: 0 | Status: SHIPPED | OUTPATIENT
Start: 2022-06-15 | End: 2022-06-15 | Stop reason: SDUPTHER

## 2022-06-15 RX ORDER — SODIUM CHLORIDE 0.9 % (FLUSH) 0.9 %
10 SYRINGE (ML) INJECTION AS NEEDED
Status: DISCONTINUED | OUTPATIENT
Start: 2022-06-15 | End: 2022-06-15 | Stop reason: HOSPADM

## 2022-06-15 RX ADMIN — ONDANSETRON 4 MG: 2 INJECTION INTRAMUSCULAR; INTRAVENOUS at 14:15

## 2022-06-15 RX ADMIN — SODIUM CHLORIDE 1000 ML: 9 INJECTION, SOLUTION INTRAVENOUS at 14:51

## 2022-06-15 RX ADMIN — MORPHINE SULFATE 4 MG: 4 INJECTION, SOLUTION INTRAMUSCULAR; INTRAVENOUS at 14:15

## 2022-06-15 RX ADMIN — CEFTRIAXONE 1 G: 1 INJECTION, POWDER, FOR SOLUTION INTRAMUSCULAR; INTRAVENOUS at 15:22

## 2022-06-15 RX ADMIN — SODIUM CHLORIDE 1000 ML: 9 INJECTION, SOLUTION INTRAVENOUS at 15:49

## 2022-06-15 RX ADMIN — MORPHINE SULFATE 2 MG: 2 INJECTION, SOLUTION INTRAMUSCULAR; INTRAVENOUS at 16:08

## 2022-06-15 NOTE — DISCHARGE INSTRUCTIONS
Please take your antibiotics and increase your fluids. Please follow up with your PCP in 2 days or return to ER if symptoms worsen.

## 2022-06-15 NOTE — ED PROVIDER NOTES
Subjective   This is a 37 year old female patient who presents to the ER with chief complaint of right sided flank pain. Patient has a history of multiple kidney stones. Was seen in ER on 06/10/22 most recently for this same issue and had a right intrarenal stone. This morning, she woke up with right sided flank pain radiating into the right sided low back. Pain is severe, aching. She also has dysuria, hematuria, nausea and vomiting. Denies fever, constipation, diarrhea.           Review of Systems   Constitutional: Negative.  Negative for fever.   HENT: Negative.    Respiratory: Negative.    Cardiovascular: Negative.  Negative for chest pain.   Gastrointestinal: Positive for abdominal pain, nausea and vomiting. Negative for abdominal distention, anal bleeding, blood in stool, constipation, diarrhea and rectal pain.   Endocrine: Negative.    Genitourinary: Positive for dysuria, flank pain and hematuria. Negative for decreased urine volume, difficulty urinating, dyspareunia, enuresis, frequency, genital sores, menstrual problem, pelvic pain, urgency, vaginal bleeding, vaginal discharge and vaginal pain.   Skin: Negative.    Neurological: Negative.    Psychiatric/Behavioral: Negative.    All other systems reviewed and are negative.      Past Medical History:   Diagnosis Date   • A-fib (HCC)    • Abnormal ECG    • Anemia    • Anxiety    • Asthma    • Cancer (HCC)     Ovarian   • Depression    • Diabetes mellitus (HCC)    • DVT (deep venous thrombosis) (HCC)    • Factor 5 Leiden mutation, heterozygous (HCC)    • Fibroid    • GERD (gastroesophageal reflux disease)    • Gout    • H/O abdominal abscess    • History of sepsis    • History of transfusion    • Hyperlipidemia    • Hypothyroid    • Kidney stone    • Migraines    • Neuropathy    • Ovarian cancer (HCC) 02/2021   • Ovarian cyst    • PE (pulmonary embolism)    • Polycystic ovary syndrome    • Preeclampsia    • Rh incompatibility    • Stroke (HCC)    • TIA  (transient ischemic attack)    • Urinary tract infection    • Varicella        Allergies   Allergen Reactions   • Toradol [Ketorolac Tromethamine] Anaphylaxis and Hives   • Haldol [Haloperidol] Hives and Mental Status Change   • Tramadol Hives and Swelling   • Amoxicillin Hives and Rash   • Penicillins Hives and Rash       Past Surgical History:   Procedure Laterality Date   • ABDOMINAL SURGERY     • CARDIAC CATHETERIZATION     •  SECTION     • CHOLECYSTECTOMY     • COLONOSCOPY     • ENDOSCOPY     • EXTRACORPOREAL SHOCK WAVE LITHOTRIPSY (ESWL) Left 10/22/2021    Procedure: EXTRACORPOREAL SHOCKWAVE LITHOTRIPSY;  Surgeon: Milan Motley MD;  Location: [facility];  Service: Urology;  Laterality: Left;   • LITHOTRIPSY     • RIGHT OOPHORECTOMY     • URETEROSCOPY LASER LITHOTRIPSY WITH STENT INSERTION Left 10/01/2021    Procedure: URETEROSCOPY WITH STENT PLACEMENT;  Surgeon: Milan Motley MD;  Location: [facility];  Service: Urology;  Laterality: Left;   • URETEROSCOPY LASER LITHOTRIPSY WITH STENT INSERTION Left 2022    Procedure: URETEROSCOPY STENT REMOVAL;  Surgeon: Milan Motley MD;  Location: [facility];  Service: Urology;  Laterality: Left;       Family History   Problem Relation Age of Onset   • Hypertension Mother    • Diabetes Father    • Hypertension Father    • Heart attack Father    • No Known Problems Sister    • No Known Problems Brother    • No Known Problems Son    • No Known Problems Daughter    • No Known Problems Maternal Grandmother    • No Known Problems Maternal Grandfather    • No Known Problems Paternal Grandmother    • No Known Problems Paternal Grandfather    • No Known Problems Cousin    • Rheum arthritis Neg Hx    • Osteoarthritis Neg Hx    • Asthma Neg Hx    • Heart failure Neg Hx    • Hyperlipidemia Neg Hx    • Migraines Neg Hx    • Rashes / Skin problems Neg Hx    • Seizures Neg Hx    • Stroke Neg Hx    • Thyroid disease Neg Hx        Social History  "    Socioeconomic History   • Marital status:    Tobacco Use   • Smoking status: Never Smoker   • Smokeless tobacco: Never Used   Vaping Use   • Vaping Use: Never used   Substance and Sexual Activity   • Alcohol use: No   • Drug use: Never     Comment: Pt has track marks on right arm. Pt states \"It's from my INR being drawn.\"    • Sexual activity: Not Currently     Partners: Male     Birth control/protection: None           Objective   Physical Exam  Vitals and nursing note reviewed.   Constitutional:       General: She is not in acute distress.     Appearance: She is well-developed. She is not diaphoretic.   HENT:      Head: Normocephalic and atraumatic.      Right Ear: External ear normal.      Left Ear: External ear normal.      Nose: Nose normal.   Eyes:      Conjunctiva/sclera: Conjunctivae normal.      Pupils: Pupils are equal, round, and reactive to light.   Neck:      Vascular: No JVD.      Trachea: No tracheal deviation.   Cardiovascular:      Rate and Rhythm: Normal rate and regular rhythm.      Heart sounds: Normal heart sounds. No murmur heard.  Pulmonary:      Effort: Pulmonary effort is normal. No respiratory distress.      Breath sounds: Normal breath sounds. No wheezing.   Abdominal:      General: Bowel sounds are normal.      Palpations: Abdomen is soft.      Tenderness: There is abdominal tenderness. There is right CVA tenderness. There is no guarding or rebound.   Musculoskeletal:         General: No deformity. Normal range of motion.      Cervical back: Normal range of motion and neck supple.   Skin:     General: Skin is warm and dry.      Coloration: Skin is not pale.      Findings: No erythema or rash.   Neurological:      Mental Status: She is alert and oriented to person, place, and time.      Cranial Nerves: No cranial nerve deficit.   Psychiatric:         Behavior: Behavior normal.         Thought Content: Thought content normal.         Procedures           ED Course  ED Course as " of 06/15/22 1806   Wed Rai 15, 2022   1803 Patient has a chronically elevated lactic acid; reflex has improved. Patient diagnosed with UTI. Will be d/c home with rx for omnicef. Will f/u with PCP in 2 days or return to ER if symptoms worsen.  [MM]      ED Course User Index  [MM] Nuvia Cee PA                                                 MDM  Number of Diagnoses or Management Options     Amount and/or Complexity of Data Reviewed  Clinical lab tests: ordered and reviewed  Decide to obtain previous medical records or to obtain history from someone other than the patient: yes        Final diagnoses:   Acute cystitis with hematuria       ED Disposition  ED Disposition     ED Disposition   Discharge    Condition   Stable    Comment   --             Eddie Latif, APRN  [address] [facility] [address] [phone]    In 2 days           Medication List      New Prescriptions    cefdinir 300 MG capsule  Commonly known as: OMNICEF  Take 1 capsule by mouth 2 (Two) Times a Day for 7 days.           Where to Get Your Medications      These medications were sent to [facility] - [place], [place] - [address] [place] [address] - [phone] [facility] [phone] [address] [address]    Phone: [phone]   · cefdinir 300 MG capsule          Nuvia Cee PA  06/15/22 1806

## 2022-06-17 ENCOUNTER — OFFICE VISIT (OUTPATIENT)
Dept: FAMILY MEDICINE CLINIC | Facility: CLINIC | Age: 37
End: 2022-06-17

## 2022-06-17 VITALS
DIASTOLIC BLOOD PRESSURE: 90 MMHG | SYSTOLIC BLOOD PRESSURE: 150 MMHG | WEIGHT: 284 LBS | HEIGHT: 64 IN | TEMPERATURE: 97.6 F | BODY MASS INDEX: 48.49 KG/M2 | OXYGEN SATURATION: 97 % | HEART RATE: 108 BPM

## 2022-06-17 DIAGNOSIS — I63.9 ISCHEMIC CEREBROVASCULAR ACCIDENT (CVA): Chronic | ICD-10-CM

## 2022-06-17 DIAGNOSIS — D68.51 FACTOR 5 LEIDEN MUTATION, HETEROZYGOUS: Chronic | ICD-10-CM

## 2022-06-17 DIAGNOSIS — E78.5 DYSLIPIDEMIA: Chronic | ICD-10-CM

## 2022-06-17 DIAGNOSIS — E53.8 VITAMIN B 12 DEFICIENCY: ICD-10-CM

## 2022-06-17 DIAGNOSIS — R49.0 HOARSENESS, PERSISTENT: ICD-10-CM

## 2022-06-17 DIAGNOSIS — I10 PRIMARY HYPERTENSION: Chronic | ICD-10-CM

## 2022-06-17 DIAGNOSIS — N20.0 RENAL CALCULI: Chronic | ICD-10-CM

## 2022-06-17 DIAGNOSIS — E11.65 TYPE 2 DIABETES MELLITUS WITH HYPERGLYCEMIA, WITH LONG-TERM CURRENT USE OF INSULIN: Primary | Chronic | ICD-10-CM

## 2022-06-17 DIAGNOSIS — E55.9 VITAMIN D DEFICIENCY: ICD-10-CM

## 2022-06-17 DIAGNOSIS — Z79.4 TYPE 2 DIABETES MELLITUS WITH HYPERGLYCEMIA, WITH LONG-TERM CURRENT USE OF INSULIN: Primary | Chronic | ICD-10-CM

## 2022-06-17 DIAGNOSIS — Z51.81 ENCOUNTER FOR MEDICATION MONITORING: ICD-10-CM

## 2022-06-17 LAB — BACTERIA SPEC AEROBE CULT: ABNORMAL

## 2022-06-17 PROCEDURE — 99214 OFFICE O/P EST MOD 30 MIN: CPT | Performed by: NURSE PRACTITIONER

## 2022-06-17 RX ORDER — METOPROLOL SUCCINATE 50 MG/1
50 TABLET, EXTENDED RELEASE ORAL DAILY
Qty: 30 TABLET | Refills: 0 | Status: SHIPPED | OUTPATIENT
Start: 2022-06-17 | End: 2022-07-21 | Stop reason: SDUPTHER

## 2022-06-17 RX ORDER — SEMAGLUTIDE 1.34 MG/ML
1 INJECTION, SOLUTION SUBCUTANEOUS WEEKLY
COMMUNITY
End: 2022-06-17 | Stop reason: SDUPTHER

## 2022-06-17 RX ORDER — SEMAGLUTIDE 1.34 MG/ML
1 INJECTION, SOLUTION SUBCUTANEOUS WEEKLY
Qty: 1 PEN | Refills: 2 | Status: SHIPPED | OUTPATIENT
Start: 2022-06-17 | End: 2022-07-21 | Stop reason: SDUPTHER

## 2022-06-17 NOTE — PROGRESS NOTES
History of Present Illness -      Natalia Arzate is a 37 y.o.female who presents to the clinic for follow up.  She was hospitalized at [facility] after a very lengthy hospitalization from 12/26/2021 until she was discharged on 2/15/2022.  She was admitted for a disseminated infection.  She presented emergently at [facility] on 12/26/2021 following a fall at home. She was transferred to . She was  diagnosed with L Pyelonephritis complicated by multiple abscesses as well as superinfection of L iliopsoas hematoma on admission at [facility]. During admission, she developed acute hypoxic respiratory failure presumed due to aspiration and septic shock. She was transferred to ICU. She was intubated and required CRRT/HD while in ICU. Eventually transferred to the floor no longer requiring HD. During hospitalization, she had an acute/subacute R cerebellar ischemic stroke on 1/26/2022 with recommendation to continue on Anticoagulation.   Natalia has been diagnosed with Factor V Leiden, DM, type 2 with neuropathy currently treated with insulin.  In addition, she has HTN, Dyslipidemia, Hypothyroidism, Renal Stones and Gout.     Since her last visit,  she has been dismissed from [facility] and is being followed by Dr Motley, Urologist and Dr Garrison, Hematologist.  Notes have been reviewed.  She does have an ENT appointment today due to her persistent hoarseness after lengthy Vent with [facility].  In addition since her last visit, she has had multiple ED visits.    Diabetes  She has type 2 diabetes mellitus treated with insulin both basal and fast acting. She is using a DexCom which is helping her.  Risk factors for coronary artery disease include diabetes mellitus, dyslipidemia, hypertension, obesity, stress and sedentary lifestyle.     Lab Results   Component Value Date    HGBA1C 5.60 04/10/2022     Hypertension  Currently taking Amlodipine and Metoprolol.  Her blood pressure has been  elevated over the last month.  Lab Results   Component Value Date    GLUCOSE 227 (H) 03/23/2022    BUN 15 03/23/2022    CREATININE 0.72 03/23/2022    EGFRIFNONA 82 02/25/2022    BCR 20.8 03/23/2022    K 3.8 03/23/2022    CO2 22.3 03/23/2022    CALCIUM 9.5 03/23/2022    ALBUMIN 3.77 03/23/2022    AST 14 03/23/2022    ALT 13 03/23/2022     Dyslipidemia  Prescribed Omega Lipid panel is due    Lab Results   Component Value Date    TRIG 373 (H) 01/05/2022     Hypothyroidism  Had previously been prescribed Levothyroxine but her last TSH was in range without medication  Lab Results   Component Value Date    TSH 3.780 03/13/2022       Kidney Disease  Previous Hydronephrosis and obstructing stone.  Stents have been removed.  Treated by Dr Motley and  Medical group. She was last seen by Dr Motley on 4/27/2022.    Back Pain  This is a recurrent problem. The current episode started yesterday. The problem occurs constantly. The problem has been rapidly worsening since onset. The pain is present in the lumbar spine. The quality of the pain is described as shooting and stabbing. The pain radiates to the right thigh. The pain is at a severity of 9/10. The pain is the same all the time. Stiffness is present at night. Associated symptoms include abdominal pain, dysuria, headaches, leg pain, numbness, tingling and weakness. Pertinent negatives include no bowel incontinence, chest pain, fever, paresis, paresthesias, pelvic pain, perianal numbness or weight loss. Risk factors include history of cancer, menopause and obesity.     The following portions of the patient's history were reviewed and updated as appropriate: allergies, current medications, past family history, past medical history, past social history, past surgical history and problem list.    Review of Systems   Constitutional: Negative for fever and weight loss.   Respiratory: Positive for cough.    Cardiovascular: Positive for leg swelling. Negative for chest pain.  "  Gastrointestinal: Positive for abdominal pain. Negative for bowel incontinence.   Genitourinary: Positive for dysuria. Negative for pelvic pain.   Musculoskeletal: Positive for arthralgias, back pain and gait problem.   Neurological: Positive for tingling, weakness, numbness and headaches. Negative for paresthesias.     Vital signs:  /90 (BP Location: Right arm, Patient Position: Sitting, Cuff Size: Adult)   Pulse 108   Temp 97.6 °F (36.4 °C) (Temporal)   Ht 162.6 cm (64.02\")   Wt 129 kg (284 lb)   SpO2 97%   BMI 48.72 kg/m²     Physical Exam  Vitals and nursing note reviewed.   Constitutional:       General: She is not in acute distress.     Appearance: She is well-developed. She is not ill-appearing.      Interventions: Face mask in place.      Comments: Pleasant; sitting in a wheelchair. daughter is with her today   HENT:      Head: Normocephalic.      Nose: Nose normal.   Eyes:      General: No scleral icterus.        Right eye: No discharge.         Left eye: No discharge.      Conjunctiva/sclera: Conjunctivae normal.   Neck:      Thyroid: No thyromegaly.      Vascular: No JVD.   Cardiovascular:      Rate and Rhythm: Normal rate and regular rhythm.      Heart sounds: Normal heart sounds. No murmur heard.    No friction rub.   Pulmonary:      Effort: Pulmonary effort is normal. No respiratory distress.      Breath sounds: Normal breath sounds. No decreased breath sounds, wheezing, rhonchi or rales.   Abdominal:      General: Bowel sounds are normal. There is no distension.      Palpations: Abdomen is soft.      Tenderness: There is no abdominal tenderness. There is no guarding or rebound.   Musculoskeletal:         General: Tenderness present.      Cervical back: Neck supple.      Right lower leg: No edema.      Left lower leg: Edema present.   Lymphadenopathy:      Cervical: No cervical adenopathy.   Skin:     General: Skin is warm and dry.      Capillary Refill: Capillary refill takes less than 2 " seconds.   Neurological:      Mental Status: She is alert and oriented to person, place, and time.      Cranial Nerves: Cranial nerves are intact.      Gait: Gait abnormal.   Psychiatric:         Mood and Affect: Mood and affect normal.         Speech: Speech normal.         Behavior: Behavior is cooperative.         Thought Content: Thought content normal.         Cognition and Memory: Cognition and memory normal.         Judgment: Judgment normal.     Result Review Consult notes   Class 3 Severe Obesity (BMI >=40). Obesity-related health conditions include the following: hypertension, diabetes mellitus, dyslipidemias and GERD. Obesity is has increased since last visit. BMI is is above average; BMI management plan is completed. We discussed portion control and increasing exercise.     Assessment & Plan     Diagnoses and all orders for this visit:    1. Type 2 diabetes mellitus with hyperglycemia, with long-term current use of insulin ([facility]) (Primary)  Comments:  Dexcom uploaded and reviewed with Natalia.  Noted average blood sugar is 308.  Discussed the significance of this and the need to improve glycemic control  Orders:  -     Semaglutide, 1 MG/DOSE, (Ozempic, 1 MG/DOSE,) 4 MG/3ML solution pen-injector; Inject 1 mg under the skin into the appropriate area as directed 1 (One) Time Per Week for 4 doses.  Dispense: 1 pen; Refill: 2    2. Primary hypertension  Comments:  Blood pressure is not at goal.  We will increase metoprolol to 50 mg continue to monitor both blood pressure and pulse  Orders:  -     metoprolol succinate XL (TOPROL-XL) 50 MG 24 hr tablet; Take 1 tablet by mouth Daily.  Dispense: 30 tablet; Refill: 0    3. Dyslipidemia  Comments:  Continue omega.  Lipid panel will be ordered in July with routine labs    4. Ischemic cerebrovascular accident (CVA) ([facility])  Comments:  Will refer to PT and OT    5. Factor 5 Leiden mutation, heterozygous ([facility])  Comments:  Continue to follow-up with hematologist.  Continue  requests with safety precautions    6. Renal calculi  Comments:  Continue to follow-up with Urologist    7. Vitamin D deficiency  Comments:  Vitamin D continued    8. Vitamin B 12 deficiency  Comments:  Vitamin B 12 continued    9. Hoarseness, persistent  Comments:  ENT appointment today    10. Encounter for medication monitoring  Comments:  UDS completed today  Orders:  -     Urine Drug Screen - Urine, Clean Catch; Future    Follow Up July 1 which can be a video visit if needed due to her transportation issues  Findings and recommendations discussed with Natalia. Reviewed treatment options. Lifestyle modifications reinforced including nutrition and activity recommendations.  She will follow up in 1 week sooner if problems/concerns occur.  Natalia was given instructions and counseling regarding her condition or for health maintenance advice. Please see specific information pulled into the AVS if appropriate    This document has been electronically signed by:

## 2022-06-19 DIAGNOSIS — I63.9 ISCHEMIC CEREBROVASCULAR ACCIDENT (CVA): Primary | ICD-10-CM

## 2022-06-19 DIAGNOSIS — M54.16 LEFT LUMBAR RADICULOPATHY: ICD-10-CM

## 2022-06-19 DIAGNOSIS — Z74.09 MOBILITY IMPAIRED: ICD-10-CM

## 2022-06-19 DIAGNOSIS — G89.29 OTHER CHRONIC PAIN: ICD-10-CM

## 2022-06-20 LAB
BACTERIA SPEC AEROBE CULT: NORMAL
BACTERIA SPEC AEROBE CULT: NORMAL

## 2022-06-21 ENCOUNTER — APPOINTMENT (OUTPATIENT)
Dept: GENERAL RADIOLOGY | Facility: HOSPITAL | Age: 37
End: 2022-06-21

## 2022-06-21 ENCOUNTER — HOSPITAL ENCOUNTER (EMERGENCY)
Facility: HOSPITAL | Age: 37
Discharge: HOME | End: 2022-06-21
Attending: STUDENT IN AN ORGANIZED HEALTH CARE EDUCATION/TRAINING PROGRAM | Admitting: STUDENT IN AN ORGANIZED HEALTH CARE EDUCATION/TRAINING PROGRAM

## 2022-06-21 VITALS
HEIGHT: 64 IN | OXYGEN SATURATION: 98 % | BODY MASS INDEX: 44.39 KG/M2 | SYSTOLIC BLOOD PRESSURE: 150 MMHG | WEIGHT: 260 LBS | RESPIRATION RATE: 18 BRPM | DIASTOLIC BLOOD PRESSURE: 98 MMHG | HEART RATE: 92 BPM | TEMPERATURE: 98.1 F

## 2022-06-21 DIAGNOSIS — N30.01 ACUTE CYSTITIS WITH HEMATURIA: Primary | ICD-10-CM

## 2022-06-21 DIAGNOSIS — N23 RENAL COLIC ON RIGHT SIDE: ICD-10-CM

## 2022-06-21 LAB
ALBUMIN SERPL-MCNC: 4.12 G/DL (ref 3.5–5.2)
ALBUMIN/GLOB SERPL: 1.2 G/DL
ALP SERPL-CCNC: 116 U/L (ref 39–117)
ALT SERPL W P-5'-P-CCNC: 30 U/L (ref 1–33)
ANION GAP SERPL CALCULATED.3IONS-SCNC: 17.7 MMOL/L (ref 5–15)
AST SERPL-CCNC: 41 U/L (ref 1–32)
B-HCG UR QL: NEGATIVE
BACTERIA UR QL AUTO: ABNORMAL /HPF
BASOPHILS # BLD AUTO: 0.03 10*3/MM3 (ref 0–0.2)
BASOPHILS NFR BLD AUTO: 0.6 % (ref 0–1.5)
BILIRUB SERPL-MCNC: 0.4 MG/DL (ref 0–1.2)
BILIRUB UR QL STRIP: NEGATIVE
BUN SERPL-MCNC: 16 MG/DL (ref 6–20)
BUN/CREAT SERPL: 26.7 (ref 7–25)
CALCIUM SPEC-SCNC: 9.6 MG/DL (ref 8.6–10.5)
CHLORIDE SERPL-SCNC: 95 MMOL/L (ref 98–107)
CLARITY UR: CLEAR
CO2 SERPL-SCNC: 19.3 MMOL/L (ref 22–29)
COLOR UR: YELLOW
CREAT SERPL-MCNC: 0.6 MG/DL (ref 0.57–1)
DEPRECATED RDW RBC AUTO: 41.5 FL (ref 37–54)
EGFRCR SERPLBLD CKD-EPI 2021: 119.5 ML/MIN/1.73
EOSINOPHIL # BLD AUTO: 0.12 10*3/MM3 (ref 0–0.4)
EOSINOPHIL NFR BLD AUTO: 2.5 % (ref 0.3–6.2)
ERYTHROCYTE [DISTWIDTH] IN BLOOD BY AUTOMATED COUNT: 12.7 % (ref 12.3–15.4)
GLOBULIN UR ELPH-MCNC: 3.5 GM/DL
GLUCOSE SERPL-MCNC: 308 MG/DL (ref 65–99)
GLUCOSE UR STRIP-MCNC: ABNORMAL MG/DL
HCT VFR BLD AUTO: 34 % (ref 34–46.6)
HGB BLD-MCNC: 12.1 G/DL (ref 12–15.9)
HGB UR QL STRIP.AUTO: ABNORMAL
HYALINE CASTS UR QL AUTO: ABNORMAL /LPF
IMM GRANULOCYTES # BLD AUTO: 0.04 10*3/MM3 (ref 0–0.05)
IMM GRANULOCYTES NFR BLD AUTO: 0.8 % (ref 0–0.5)
KETONES UR QL STRIP: NEGATIVE
LEUKOCYTE ESTERASE UR QL STRIP.AUTO: ABNORMAL
LIPASE SERPL-CCNC: 47 U/L (ref 13–60)
LYMPHOCYTES # BLD AUTO: 1.41 10*3/MM3 (ref 0.7–3.1)
LYMPHOCYTES NFR BLD AUTO: 29.9 % (ref 19.6–45.3)
MCH RBC QN AUTO: 32.6 PG (ref 26.6–33)
MCHC RBC AUTO-ENTMCNC: 35.6 G/DL (ref 31.5–35.7)
MCV RBC AUTO: 91.6 FL (ref 79–97)
MONOCYTES # BLD AUTO: 0.39 10*3/MM3 (ref 0.1–0.9)
MONOCYTES NFR BLD AUTO: 8.3 % (ref 5–12)
NEUTROPHILS NFR BLD AUTO: 2.72 10*3/MM3 (ref 1.7–7)
NEUTROPHILS NFR BLD AUTO: 57.9 % (ref 42.7–76)
NITRITE UR QL STRIP: NEGATIVE
NRBC BLD AUTO-RTO: 0 /100 WBC (ref 0–0.2)
PH UR STRIP.AUTO: 5.5 [PH] (ref 5–8)
PLATELET # BLD AUTO: 231 10*3/MM3 (ref 140–450)
PMV BLD AUTO: 8.6 FL (ref 6–12)
POTASSIUM SERPL-SCNC: 4.2 MMOL/L (ref 3.5–5.2)
PROT SERPL-MCNC: 7.6 G/DL (ref 6–8.5)
PROT UR QL STRIP: ABNORMAL
RBC # BLD AUTO: 3.71 10*6/MM3 (ref 3.77–5.28)
RBC # UR STRIP: ABNORMAL /HPF
REF LAB TEST METHOD: ABNORMAL
SODIUM SERPL-SCNC: 132 MMOL/L (ref 136–145)
SP GR UR STRIP: 1.02 (ref 1–1.03)
SQUAMOUS #/AREA URNS HPF: ABNORMAL /HPF
UROBILINOGEN UR QL STRIP: ABNORMAL
WBC # UR STRIP: ABNORMAL /HPF
WBC NRBC COR # BLD: 4.71 10*3/MM3 (ref 3.4–10.8)

## 2022-06-21 PROCEDURE — 74018 RADEX ABDOMEN 1 VIEW: CPT | Performed by: RADIOLOGY

## 2022-06-21 PROCEDURE — 85025 COMPLETE CBC W/AUTO DIFF WBC: CPT | Performed by: PHYSICIAN ASSISTANT

## 2022-06-21 PROCEDURE — 96374 THER/PROPH/DIAG INJ IV PUSH: CPT

## 2022-06-21 PROCEDURE — 25010000002 ONDANSETRON PER 1 MG: Performed by: STUDENT IN AN ORGANIZED HEALTH CARE EDUCATION/TRAINING PROGRAM

## 2022-06-21 PROCEDURE — 25010000002 MORPHINE PER 10 MG: Performed by: STUDENT IN AN ORGANIZED HEALTH CARE EDUCATION/TRAINING PROGRAM

## 2022-06-21 PROCEDURE — 81001 URINALYSIS AUTO W/SCOPE: CPT | Performed by: PHYSICIAN ASSISTANT

## 2022-06-21 PROCEDURE — 25010000002 HYDROMORPHONE 1 MG/ML SOLUTION: Performed by: STUDENT IN AN ORGANIZED HEALTH CARE EDUCATION/TRAINING PROGRAM

## 2022-06-21 PROCEDURE — 80053 COMPREHEN METABOLIC PANEL: CPT | Performed by: PHYSICIAN ASSISTANT

## 2022-06-21 PROCEDURE — 81025 URINE PREGNANCY TEST: CPT | Performed by: PHYSICIAN ASSISTANT

## 2022-06-21 PROCEDURE — 74018 RADEX ABDOMEN 1 VIEW: CPT

## 2022-06-21 PROCEDURE — 96375 TX/PRO/DX INJ NEW DRUG ADDON: CPT

## 2022-06-21 PROCEDURE — 99283 EMERGENCY DEPT VISIT LOW MDM: CPT

## 2022-06-21 PROCEDURE — 83690 ASSAY OF LIPASE: CPT | Performed by: PHYSICIAN ASSISTANT

## 2022-06-21 RX ORDER — NITROFURANTOIN 25; 75 MG/1; MG/1
100 CAPSULE ORAL 2 TIMES DAILY
Qty: 14 CAPSULE | Refills: 0 | Status: SHIPPED | OUTPATIENT
Start: 2022-06-21 | End: 2022-06-29 | Stop reason: HOSPADM

## 2022-06-21 RX ORDER — HYDROCODONE BITARTRATE AND ACETAMINOPHEN 5; 325 MG/1; MG/1
1 TABLET ORAL EVERY 4 HOURS PRN
Qty: 12 TABLET | Refills: 0 | Status: SHIPPED | OUTPATIENT
Start: 2022-06-21 | End: 2022-07-23

## 2022-06-21 RX ORDER — SODIUM CHLORIDE 0.9 % (FLUSH) 0.9 %
10 SYRINGE (ML) INJECTION AS NEEDED
Status: DISCONTINUED | OUTPATIENT
Start: 2022-06-21 | End: 2022-06-21 | Stop reason: HOSPADM

## 2022-06-21 RX ORDER — ONDANSETRON 2 MG/ML
4 INJECTION INTRAMUSCULAR; INTRAVENOUS ONCE
Status: COMPLETED | OUTPATIENT
Start: 2022-06-21 | End: 2022-06-21

## 2022-06-21 RX ADMIN — SODIUM CHLORIDE 1000 ML: 9 INJECTION, SOLUTION INTRAVENOUS at 10:43

## 2022-06-21 RX ADMIN — ONDANSETRON 4 MG: 2 INJECTION INTRAMUSCULAR; INTRAVENOUS at 10:44

## 2022-06-21 RX ADMIN — HYDROMORPHONE HYDROCHLORIDE 1 MG: 1 INJECTION, SOLUTION INTRAMUSCULAR; INTRAVENOUS; SUBCUTANEOUS at 12:30

## 2022-06-21 RX ADMIN — MORPHINE SULFATE 4 MG: 4 INJECTION, SOLUTION INTRAMUSCULAR; INTRAVENOUS at 10:44

## 2022-06-21 NOTE — ED PROVIDER NOTES
Subjective   37-year-old female who presents to the emergency department chief complaint right flank pain.  Patient has history of kidney stones.  Describes pain as sharp, stabbing pain.  Patient is well-known to this emergency department.  Patient also has significant history of GERD, hyperlipidemia, diabetes, factor V Leiden, DVT, asthma, A. fib.  Denies any fever, chills, nausea, vomiting.      History provided by:  Patient   used: No    Flank Pain  Pain location:  R flank  Pain quality: sharp and stabbing    Pain radiates to:  Does not radiate  Pain severity:  Moderate  Onset quality:  Gradual  Duration:  2 days  Timing:  Intermittent  Progression:  Worsening  Chronicity:  New  Context: not alcohol use, not awakening from sleep, not diet changes, not laxative use, not previous surgeries, not recent illness, not recent sexual activity, not recent travel, not retching, not sick contacts and not suspicious food intake    Relieved by:  Nothing  Worsened by:  Nothing  Ineffective treatments:  None tried  Associated symptoms: chills, dysuria, fatigue and nausea    Associated symptoms: no chest pain, no flatus, no hematemesis, no hematochezia, no hematuria, no shortness of breath, no vaginal bleeding, no vaginal discharge and no vomiting    Risk factors: no alcohol abuse, no aspirin use, not elderly, has not had multiple surgeries, no NSAID use, not obese, not pregnant and no recent hospitalization        Review of Systems   Constitutional: Positive for chills and fatigue.   HENT: Negative.  Negative for drooling, ear discharge, ear pain, facial swelling, hearing loss and mouth sores.    Eyes: Negative.  Negative for photophobia, pain, discharge, redness and itching.   Respiratory: Negative.  Negative for choking, shortness of breath and stridor.    Cardiovascular: Negative.  Negative for chest pain, palpitations and leg swelling.   Gastrointestinal: Positive for nausea. Negative for flatus,  hematemesis, hematochezia and vomiting.   Endocrine: Negative.  Negative for cold intolerance, heat intolerance, polydipsia and polyphagia.   Genitourinary: Positive for dysuria, flank pain and frequency. Negative for hematuria, vaginal bleeding and vaginal discharge.   Musculoskeletal: Negative for back pain, gait problem, joint swelling, myalgias and neck pain.   Skin: Negative.  Negative for color change, pallor, rash and wound.   Neurological: Negative.  Negative for seizures, speech difficulty, light-headedness, numbness and headaches.   Hematological: Negative.  Does not bruise/bleed easily.   Psychiatric/Behavioral: Negative.  Negative for behavioral problems, confusion, decreased concentration, dysphoric mood and self-injury. The patient is not nervous/anxious and is not hyperactive.    All other systems reviewed and are negative.      Past Medical History:   Diagnosis Date   • A-fib (HCC)    • Abnormal ECG    • Anemia    • Anxiety    • Asthma    • Cancer (HCC)     Ovarian   • Depression    • Diabetes mellitus (HCC)    • DVT (deep venous thrombosis) ([facility])    • Factor 5 Leiden mutation, heterozygous ([facility])    • Fibroid    • GERD (gastroesophageal reflux disease)    • Gout    • H/O abdominal abscess    • History of sepsis    • History of transfusion    • Hyperlipidemia    • Hypothyroid    • Kidney stone    • Migraines    • Neuropathy    • Ovarian cancer (HCC) 02/2021   • Ovarian cyst    • PE (pulmonary embolism)    • Polycystic ovary syndrome    • Preeclampsia    • Rh incompatibility    • Stroke (HCC)    • TIA (transient ischemic attack)    • Urinary tract infection    • Varicella        Allergies   Allergen Reactions   • Toradol [Ketorolac Tromethamine] Anaphylaxis and Hives   • Haldol [Haloperidol] Hives and Mental Status Change   • Tramadol Hives and Swelling   • Amoxicillin Hives and Rash   • Penicillins Hives and Rash       Past Surgical History:   Procedure Laterality Date   • ABDOMINAL SURGERY     •  "CARDIAC CATHETERIZATION     •  SECTION     • CHOLECYSTECTOMY     • COLONOSCOPY     • ENDOSCOPY     • EXTRACORPOREAL SHOCK WAVE LITHOTRIPSY (ESWL) Left 10/22/2021    Procedure: EXTRACORPOREAL SHOCKWAVE LITHOTRIPSY;  Surgeon: Milan Motley MD;  Location: [facility];  Service: Urology;  Laterality: Left;   • LITHOTRIPSY     • RIGHT OOPHORECTOMY     • URETEROSCOPY LASER LITHOTRIPSY WITH STENT INSERTION Left 10/01/2021    Procedure: URETEROSCOPY WITH STENT PLACEMENT;  Surgeon: Milan Motley MD;  Location: [facility];  Service: Urology;  Laterality: Left;   • URETEROSCOPY LASER LITHOTRIPSY WITH STENT INSERTION Left 2022    Procedure: URETEROSCOPY STENT REMOVAL;  Surgeon: Milan Motley MD;  Location: [facility];  Service: Urology;  Laterality: Left;       Family History   Problem Relation Age of Onset   • Hypertension Mother    • Diabetes Father    • Hypertension Father    • Heart attack Father    • No Known Problems Sister    • No Known Problems Brother    • No Known Problems Son    • No Known Problems Daughter    • No Known Problems Maternal Grandmother    • No Known Problems Maternal Grandfather    • No Known Problems Paternal Grandmother    • No Known Problems Paternal Grandfather    • No Known Problems Cousin    • Rheum arthritis Neg Hx    • Osteoarthritis Neg Hx    • Asthma Neg Hx    • Heart failure Neg Hx    • Hyperlipidemia Neg Hx    • Migraines Neg Hx    • Rashes / Skin problems Neg Hx    • Seizures Neg Hx    • Stroke Neg Hx    • Thyroid disease Neg Hx        Social History     Socioeconomic History   • Marital status:    Tobacco Use   • Smoking status: Never Smoker   • Smokeless tobacco: Never Used   Vaping Use   • Vaping Use: Never used   Substance and Sexual Activity   • Alcohol use: No   • Drug use: Never     Comment: Pt has track marks on right arm. Pt states \"It's from my INR being drawn.\"    • Sexual activity: Not Currently     Partners: Male     Birth " control/protection: None           Objective   Physical Exam  Vitals and nursing note reviewed.   Constitutional:       General: She is not in acute distress.     Appearance: Normal appearance. She is well-developed and normal weight.   HENT:      Head: Normocephalic and atraumatic.      Right Ear: Tympanic membrane, ear canal and external ear normal.      Left Ear: Tympanic membrane, ear canal and external ear normal.      Nose: Nose normal.      Mouth/Throat:      Mouth: Mucous membranes are moist.      Pharynx: No oropharyngeal exudate.   Eyes:      Conjunctiva/sclera: Conjunctivae normal.      Pupils: Pupils are equal, round, and reactive to light.   Cardiovascular:      Rate and Rhythm: Normal rate and regular rhythm.      Heart sounds: Normal heart sounds.   Pulmonary:      Effort: Pulmonary effort is normal.      Breath sounds: Normal breath sounds.   Abdominal:      General: Bowel sounds are normal.      Palpations: Abdomen is soft.      Tenderness: There is generalized abdominal tenderness.   Musculoskeletal:         General: Normal range of motion.      Cervical back: Normal range of motion and neck supple.   Skin:     General: Skin is warm and dry.   Neurological:      Mental Status: She is alert and oriented to person, place, and time.   Psychiatric:         Behavior: Behavior normal.         Thought Content: Thought content normal.         Judgment: Judgment normal.         Procedures           ED Course  ED Course as of 06/21/22 1217   Tue Jun 21, 2022   1144 IMPRESSION:    No acute findings.    []      ED Course User Index  [] Dayron Loco PA-C                                           [facility]    Final diagnoses:   Acute cystitis with hematuria   Renal colic on right side       ED Disposition  ED Disposition     ED Disposition   Discharge    Condition   Stable    Comment   --             Anne Parikh, APRN  [address] [facility] [place]  [address]  [phone]    Call in 1 day           Medication List      New Prescriptions    HYDROcodone-acetaminophen 5-325 MG per tablet  Commonly known as: NORCO  Take 1 tablet by mouth Every 4 (Four) Hours As Needed for Mild Pain .     nitrofurantoin (macrocrystal-monohydrate) 100 MG capsule  Commonly known as: MACROBID  Take 1 capsule by mouth 2 (Two) Times a Day for 7 days.           Where to Get Your Medications      These medications were sent to [place], [place] - [address] [place] [address] [phone] [place] [id number] [address] [address]    Phone: [phone]   · HYDROcodone-acetaminophen 5-325 MG per tablet  · nitrofurantoin (macrocrystal-monohydrate) 100 MG capsule          Dayron Loco PA-C  06/21/22 6930

## 2022-06-22 DIAGNOSIS — M1A.9XX0 CHRONIC GOUT WITHOUT TOPHUS, UNSPECIFIED CAUSE, UNSPECIFIED SITE: Chronic | ICD-10-CM

## 2022-06-22 DIAGNOSIS — N20.1 URETERAL CALCULUS: ICD-10-CM

## 2022-06-22 DIAGNOSIS — E11.40 TYPE 2 DIABETES MELLITUS WITH DIABETIC NEUROPATHY, WITH LONG-TERM CURRENT USE OF INSULIN: ICD-10-CM

## 2022-06-22 DIAGNOSIS — N20.0 RENAL CALCULUS, LEFT: ICD-10-CM

## 2022-06-22 DIAGNOSIS — R05.9 COUGH: ICD-10-CM

## 2022-06-22 DIAGNOSIS — Z79.4 TYPE 2 DIABETES MELLITUS WITH DIABETIC NEUROPATHY, WITH LONG-TERM CURRENT USE OF INSULIN: ICD-10-CM

## 2022-06-22 RX ORDER — DEXTROMETHORPHAN HYDROBROMIDE AND PROMETHAZINE HYDROCHLORIDE 15; 6.25 MG/5ML; MG/5ML
5 SYRUP ORAL 2 TIMES DAILY PRN
Qty: 180 ML | Refills: 1 | Status: SHIPPED | OUTPATIENT
Start: 2022-06-22 | End: 2022-12-01 | Stop reason: SDUPTHER

## 2022-06-22 RX ORDER — GABAPENTIN 300 MG/1
300 CAPSULE ORAL 2 TIMES DAILY
Qty: 60 CAPSULE | Refills: 0 | OUTPATIENT
Start: 2022-06-22 | End: 2022-07-22

## 2022-06-22 RX ORDER — ALLOPURINOL 100 MG/1
100 TABLET ORAL DAILY
Qty: 30 TABLET | Refills: 0 | Status: SHIPPED | OUTPATIENT
Start: 2022-06-22 | End: 2022-07-21 | Stop reason: SDUPTHER

## 2022-06-22 RX ORDER — PROMETHAZINE HYDROCHLORIDE 25 MG/1
25 TABLET ORAL EVERY 6 HOURS PRN
Qty: 21 TABLET | Refills: 2 | Status: CANCELLED | OUTPATIENT
Start: 2022-06-22

## 2022-06-22 RX ORDER — OXYCODONE AND ACETAMINOPHEN 10; 325 MG/1; MG/1
1 TABLET ORAL EVERY 4 HOURS PRN
Qty: 12 TABLET | Refills: 0 | Status: CANCELLED | OUTPATIENT
Start: 2022-06-22

## 2022-06-24 ENCOUNTER — HOSPITAL ENCOUNTER (EMERGENCY)
Facility: HOSPITAL | Age: 37
Discharge: HOME | End: 2022-06-24
Attending: STUDENT IN AN ORGANIZED HEALTH CARE EDUCATION/TRAINING PROGRAM | Admitting: STUDENT IN AN ORGANIZED HEALTH CARE EDUCATION/TRAINING PROGRAM

## 2022-06-24 ENCOUNTER — APPOINTMENT (OUTPATIENT)
Dept: CT IMAGING | Facility: HOSPITAL | Age: 37
End: 2022-06-24

## 2022-06-24 VITALS
WEIGHT: 260 LBS | HEART RATE: 123 BPM | DIASTOLIC BLOOD PRESSURE: 121 MMHG | OXYGEN SATURATION: 95 % | SYSTOLIC BLOOD PRESSURE: 157 MMHG | RESPIRATION RATE: 22 BRPM | BODY MASS INDEX: 44.39 KG/M2 | HEIGHT: 64 IN | TEMPERATURE: 98.6 F

## 2022-06-24 DIAGNOSIS — E11.40 TYPE 2 DIABETES MELLITUS WITH DIABETIC NEUROPATHY, WITH LONG-TERM CURRENT USE OF INSULIN: ICD-10-CM

## 2022-06-24 DIAGNOSIS — N12 PYELONEPHRITIS: Primary | ICD-10-CM

## 2022-06-24 DIAGNOSIS — Z79.4 TYPE 2 DIABETES MELLITUS WITH DIABETIC NEUROPATHY, WITH LONG-TERM CURRENT USE OF INSULIN: ICD-10-CM

## 2022-06-24 LAB
ALBUMIN SERPL-MCNC: 4.34 G/DL (ref 3.5–5.2)
ALBUMIN/GLOB SERPL: 1.2 G/DL
ALP SERPL-CCNC: 123 U/L (ref 39–117)
ALT SERPL W P-5'-P-CCNC: 36 U/L (ref 1–33)
ANION GAP SERPL CALCULATED.3IONS-SCNC: 18.6 MMOL/L (ref 5–15)
AST SERPL-CCNC: 39 U/L (ref 1–32)
BACTERIA UR QL AUTO: ABNORMAL /HPF
BASOPHILS # BLD AUTO: 0.03 10*3/MM3 (ref 0–0.2)
BASOPHILS NFR BLD AUTO: 0.7 % (ref 0–1.5)
BILIRUB SERPL-MCNC: 0.3 MG/DL (ref 0–1.2)
BILIRUB UR QL STRIP: NEGATIVE
BUN SERPL-MCNC: 15 MG/DL (ref 6–20)
BUN/CREAT SERPL: 22.7 (ref 7–25)
CALCIUM SPEC-SCNC: 9.8 MG/DL (ref 8.6–10.5)
CHLORIDE SERPL-SCNC: 98 MMOL/L (ref 98–107)
CLARITY UR: CLEAR
CO2 SERPL-SCNC: 19.4 MMOL/L (ref 22–29)
COLOR UR: YELLOW
CREAT SERPL-MCNC: 0.66 MG/DL (ref 0.57–1)
DEPRECATED RDW RBC AUTO: 41.9 FL (ref 37–54)
EGFRCR SERPLBLD CKD-EPI 2021: 116.8 ML/MIN/1.73
EOSINOPHIL # BLD AUTO: 0.1 10*3/MM3 (ref 0–0.4)
EOSINOPHIL NFR BLD AUTO: 2.2 % (ref 0.3–6.2)
ERYTHROCYTE [DISTWIDTH] IN BLOOD BY AUTOMATED COUNT: 12.8 % (ref 12.3–15.4)
GLOBULIN UR ELPH-MCNC: 3.6 GM/DL
GLUCOSE SERPL-MCNC: 301 MG/DL (ref 65–99)
GLUCOSE UR STRIP-MCNC: ABNORMAL MG/DL
HCT VFR BLD AUTO: 36.2 % (ref 34–46.6)
HGB BLD-MCNC: 12.6 G/DL (ref 12–15.9)
HGB UR QL STRIP.AUTO: ABNORMAL
HOLD SPECIMEN: NORMAL
HOLD SPECIMEN: NORMAL
HYALINE CASTS UR QL AUTO: ABNORMAL /LPF
IMM GRANULOCYTES # BLD AUTO: 0.04 10*3/MM3 (ref 0–0.05)
IMM GRANULOCYTES NFR BLD AUTO: 0.9 % (ref 0–0.5)
KETONES UR QL STRIP: ABNORMAL
LEUKOCYTE ESTERASE UR QL STRIP.AUTO: ABNORMAL
LYMPHOCYTES # BLD AUTO: 1.5 10*3/MM3 (ref 0.7–3.1)
LYMPHOCYTES NFR BLD AUTO: 33.3 % (ref 19.6–45.3)
MCH RBC QN AUTO: 32 PG (ref 26.6–33)
MCHC RBC AUTO-ENTMCNC: 34.8 G/DL (ref 31.5–35.7)
MCV RBC AUTO: 91.9 FL (ref 79–97)
MONOCYTES # BLD AUTO: 0.37 10*3/MM3 (ref 0.1–0.9)
MONOCYTES NFR BLD AUTO: 8.2 % (ref 5–12)
NEUTROPHILS NFR BLD AUTO: 2.46 10*3/MM3 (ref 1.7–7)
NEUTROPHILS NFR BLD AUTO: 54.7 % (ref 42.7–76)
NITRITE UR QL STRIP: NEGATIVE
NRBC BLD AUTO-RTO: 0 /100 WBC (ref 0–0.2)
PH UR STRIP.AUTO: <=5 [PH] (ref 5–8)
PLATELET # BLD AUTO: 241 10*3/MM3 (ref 140–450)
PMV BLD AUTO: 8.7 FL (ref 6–12)
POTASSIUM SERPL-SCNC: 4.3 MMOL/L (ref 3.5–5.2)
PROT SERPL-MCNC: 7.9 G/DL (ref 6–8.5)
PROT UR QL STRIP: ABNORMAL
RBC # BLD AUTO: 3.94 10*6/MM3 (ref 3.77–5.28)
RBC # UR STRIP: ABNORMAL /HPF
REF LAB TEST METHOD: ABNORMAL
SODIUM SERPL-SCNC: 136 MMOL/L (ref 136–145)
SP GR UR STRIP: 1.02 (ref 1–1.03)
SQUAMOUS #/AREA URNS HPF: ABNORMAL /HPF
UROBILINOGEN UR QL STRIP: ABNORMAL
WBC # UR STRIP: ABNORMAL /HPF
WBC NRBC COR # BLD: 4.5 10*3/MM3 (ref 3.4–10.8)
WHOLE BLOOD HOLD COAG: NORMAL
WHOLE BLOOD HOLD SPECIMEN: NORMAL
YEAST URNS QL MICRO: ABNORMAL /HPF

## 2022-06-24 PROCEDURE — 81001 URINALYSIS AUTO W/SCOPE: CPT | Performed by: PHYSICIAN ASSISTANT

## 2022-06-24 PROCEDURE — 74176 CT ABD & PELVIS W/O CONTRAST: CPT

## 2022-06-24 PROCEDURE — 85025 COMPLETE CBC W/AUTO DIFF WBC: CPT | Performed by: PHYSICIAN ASSISTANT

## 2022-06-24 PROCEDURE — 25010000002 ONDANSETRON PER 1 MG: Performed by: PHYSICIAN ASSISTANT

## 2022-06-24 PROCEDURE — 96375 TX/PRO/DX INJ NEW DRUG ADDON: CPT

## 2022-06-24 PROCEDURE — 80053 COMPREHEN METABOLIC PANEL: CPT | Performed by: PHYSICIAN ASSISTANT

## 2022-06-24 PROCEDURE — 25010000002 HYDROMORPHONE PER 4 MG: Performed by: STUDENT IN AN ORGANIZED HEALTH CARE EDUCATION/TRAINING PROGRAM

## 2022-06-24 PROCEDURE — 87086 URINE CULTURE/COLONY COUNT: CPT | Performed by: PHYSICIAN ASSISTANT

## 2022-06-24 PROCEDURE — 96374 THER/PROPH/DIAG INJ IV PUSH: CPT

## 2022-06-24 PROCEDURE — 74176 CT ABD & PELVIS W/O CONTRAST: CPT | Performed by: RADIOLOGY

## 2022-06-24 PROCEDURE — 99283 EMERGENCY DEPT VISIT LOW MDM: CPT

## 2022-06-24 RX ORDER — HYDROCODONE BITARTRATE AND ACETAMINOPHEN 5; 325 MG/1; MG/1
1 TABLET ORAL EVERY 6 HOURS PRN
Qty: 5 TABLET | Refills: 0 | Status: SHIPPED | OUTPATIENT
Start: 2022-06-24 | End: 2022-07-23 | Stop reason: SDUPTHER

## 2022-06-24 RX ORDER — HYDROMORPHONE HYDROCHLORIDE 1 MG/ML
0.5 INJECTION, SOLUTION INTRAMUSCULAR; INTRAVENOUS; SUBCUTANEOUS ONCE
Status: COMPLETED | OUTPATIENT
Start: 2022-06-24 | End: 2022-06-24

## 2022-06-24 RX ORDER — ONDANSETRON 4 MG/1
4 TABLET, ORALLY DISINTEGRATING ORAL EVERY 6 HOURS PRN
Qty: 12 TABLET | Refills: 0 | Status: SHIPPED | OUTPATIENT
Start: 2022-06-24 | End: 2022-12-01

## 2022-06-24 RX ORDER — GABAPENTIN 300 MG/1
300 CAPSULE ORAL 2 TIMES DAILY
Qty: 60 CAPSULE | Refills: 0 | Status: SHIPPED | OUTPATIENT
Start: 2022-06-24 | End: 2022-07-25 | Stop reason: SDUPTHER

## 2022-06-24 RX ORDER — CEFDINIR 300 MG/1
300 CAPSULE ORAL 2 TIMES DAILY
Qty: 20 CAPSULE | Refills: 0 | Status: SHIPPED | OUTPATIENT
Start: 2022-06-24 | End: 2022-07-04

## 2022-06-24 RX ORDER — HYDROCODONE BITARTRATE AND ACETAMINOPHEN 5; 325 MG/1; MG/1
1 TABLET ORAL ONCE
Status: COMPLETED | OUTPATIENT
Start: 2022-06-24 | End: 2022-06-24

## 2022-06-24 RX ORDER — ONDANSETRON 2 MG/ML
4 INJECTION INTRAMUSCULAR; INTRAVENOUS ONCE
Status: COMPLETED | OUTPATIENT
Start: 2022-06-24 | End: 2022-06-24

## 2022-06-24 RX ORDER — ACETAMINOPHEN 500 MG
1000 TABLET ORAL ONCE
Status: DISCONTINUED | OUTPATIENT
Start: 2022-06-24 | End: 2022-06-24

## 2022-06-24 RX ORDER — TAMSULOSIN HYDROCHLORIDE 0.4 MG/1
1 CAPSULE ORAL DAILY
Qty: 30 CAPSULE | Refills: 0 | Status: SHIPPED | OUTPATIENT
Start: 2022-06-24 | End: 2022-12-01

## 2022-06-24 RX ORDER — GABAPENTIN 300 MG/1
300 CAPSULE ORAL 2 TIMES DAILY
Qty: 60 CAPSULE | Refills: 0 | Status: CANCELLED | OUTPATIENT
Start: 2022-06-24 | End: 2022-07-24

## 2022-06-24 RX ORDER — SODIUM CHLORIDE 0.9 % (FLUSH) 0.9 %
10 SYRINGE (ML) INJECTION AS NEEDED
Status: DISCONTINUED | OUTPATIENT
Start: 2022-06-24 | End: 2022-06-24 | Stop reason: HOSPADM

## 2022-06-24 RX ADMIN — HYDROCODONE BITARTRATE AND ACETAMINOPHEN 1 TABLET: 5; 325 TABLET ORAL at 11:22

## 2022-06-24 RX ADMIN — SODIUM CHLORIDE 1000 ML: 9 INJECTION, SOLUTION INTRAVENOUS at 11:25

## 2022-06-24 RX ADMIN — HYDROMORPHONE HYDROCHLORIDE 0.5 MG: 1 INJECTION, SOLUTION INTRAMUSCULAR; INTRAVENOUS; SUBCUTANEOUS at 12:41

## 2022-06-24 RX ADMIN — ONDANSETRON 4 MG: 2 INJECTION INTRAMUSCULAR; INTRAVENOUS at 11:22

## 2022-06-24 NOTE — ED PROVIDER NOTES
Subjective   38 yo female patient presents to the ED with complaints of right flank pain, nausea, and vomiting x 3 days. Reports hx of kidney stones. Patient states this is similar symptoms. Patient denies any worsening or alleviating factors.        History provided by:  Patient   used: No        Review of Systems   Constitutional: Negative.    HENT: Negative.    Eyes: Negative.    Respiratory: Negative.    Cardiovascular: Negative.    Gastrointestinal: Positive for nausea and vomiting.   Endocrine: Negative.    Genitourinary: Positive for flank pain.   Skin: Negative.    Allergic/Immunologic: Negative.    Neurological: Negative.    Hematological: Negative.    Psychiatric/Behavioral: Negative.    All other systems reviewed and are negative.      Past Medical History:   Diagnosis Date   • A-fib (HCC)    • Abnormal ECG    • Anemia    • Anxiety    • Asthma    • Cancer (HCC)     Ovarian   • Depression    • Diabetes mellitus (HCC)    • DVT (deep venous thrombosis) (HCC)    • Factor 5 Leiden mutation, heterozygous (HCC)    • Fibroid    • GERD (gastroesophageal reflux disease)    • Gout    • H/O abdominal abscess    • History of sepsis    • History of transfusion    • Hyperlipidemia    • Hypothyroid    • Kidney stone    • Migraines    • Neuropathy    • Ovarian cancer (HCC) 2021   • Ovarian cyst    • PE (pulmonary embolism)    • Polycystic ovary syndrome    • Preeclampsia    • Rh incompatibility    • Stroke (HCC)    • TIA (transient ischemic attack)    • Urinary tract infection    • Varicella        Allergies   Allergen Reactions   • Toradol [Ketorolac Tromethamine] Anaphylaxis and Hives   • Haldol [Haloperidol] Hives and Mental Status Change   • Tramadol Hives and Swelling   • Amoxicillin Hives and Rash   • Penicillins Hives and Rash       Past Surgical History:   Procedure Laterality Date   • ABDOMINAL SURGERY     • CARDIAC CATHETERIZATION     •  SECTION     • CHOLECYSTECTOMY     •  "COLONOSCOPY     • ENDOSCOPY     • EXTRACORPOREAL SHOCK WAVE LITHOTRIPSY (ESWL) Left 10/22/2021    Procedure: EXTRACORPOREAL SHOCKWAVE LITHOTRIPSY;  Surgeon: Milan Motley MD;  Location: [facility];  Service: Urology;  Laterality: Left;   • LITHOTRIPSY     • RIGHT OOPHORECTOMY     • URETEROSCOPY LASER LITHOTRIPSY WITH STENT INSERTION Left 10/01/2021    Procedure: URETEROSCOPY WITH STENT PLACEMENT;  Surgeon: Milan Motley MD;  Location: [facility];  Service: Urology;  Laterality: Left;   • URETEROSCOPY LASER LITHOTRIPSY WITH STENT INSERTION Left 4/27/2022    Procedure: URETEROSCOPY STENT REMOVAL;  Surgeon: Milan Motley MD;  Location: [facility];  Service: Urology;  Laterality: Left;       Family History   Problem Relation Age of Onset   • Hypertension Mother    • Diabetes Father    • Hypertension Father    • Heart attack Father    • No Known Problems Sister    • No Known Problems Brother    • No Known Problems Son    • No Known Problems Daughter    • No Known Problems Maternal Grandmother    • No Known Problems Maternal Grandfather    • No Known Problems Paternal Grandmother    • No Known Problems Paternal Grandfather    • No Known Problems Cousin    • Rheum arthritis Neg Hx    • Osteoarthritis Neg Hx    • Asthma Neg Hx    • Heart failure Neg Hx    • Hyperlipidemia Neg Hx    • Migraines Neg Hx    • Rashes / Skin problems Neg Hx    • Seizures Neg Hx    • Stroke Neg Hx    • Thyroid disease Neg Hx        Social History     Socioeconomic History   • Marital status:    Tobacco Use   • Smoking status: Never Smoker   • Smokeless tobacco: Never Used   Vaping Use   • Vaping Use: Never used   Substance and Sexual Activity   • Alcohol use: No   • Drug use: Never     Comment: Pt has track marks on right arm. Pt states \"It's from my INR being drawn.\"    • Sexual activity: Not Currently     Partners: Male     Birth control/protection: None           Objective   Physical Exam  Vitals and nursing " note reviewed.   Constitutional:       Appearance: She is well-developed and normal weight.   HENT:      Head: Normocephalic and atraumatic.      Mouth/Throat:      Mouth: Mucous membranes are moist.      Pharynx: Oropharynx is clear.   Eyes:      Extraocular Movements: Extraocular movements intact.      Pupils: Pupils are equal, round, and reactive to light.   Cardiovascular:      Rate and Rhythm: Normal rate and regular rhythm.      Heart sounds: Normal heart sounds.   Pulmonary:      Effort: Pulmonary effort is normal.      Breath sounds: Normal breath sounds.   Abdominal:      General: Abdomen is flat. Bowel sounds are normal.      Palpations: Abdomen is soft.   Skin:     General: Skin is warm and dry.      Capillary Refill: Capillary refill takes less than 2 seconds.   Neurological:      General: No focal deficit present.      Mental Status: She is alert and oriented to person, place, and time.   Psychiatric:         Mood and Affect: Mood normal.         Behavior: Behavior normal.         Procedures           ED Course  ED Course as of 06/24/22 1751   Fri Jun 24, 2022   1138 CT Abdomen Pelvis Stone Protocol  IMPRESSION:  1.  Today there is mild left hydronephrosis with surrounding stranding  of the left kidney potentially representing upper pole moiety UPJ  calculus obstruction of about 6 mm.  2.  Marked enlargement of fatty liver.     This report was finalized on 6/24/2022 11:31 AM by Dr. Pj Lee MD.             Specimen Collected: 06/24/22 11:29 Last Resulted: 06/24/22 11:31         [ML]   1311 PT ED stay uneventful.  [ML]   1412 Evaluated patient, she is complaining predominantly of pain at the right CVA.  Patient has chronic calcification and hydro of the proximal left ureter and kidney.  Not at site of complaint.  Patient does have significant UTI without white count elevation or fever, concern for developing pyelo.  Patient reports 8 out of 10 pain with no acute distress.  Concern for drug-seeking  behavior given numerous providers prescribing oxycodone gabapentin hydrocodone every single month.  Discussed with patient that she needs to talk to her primary care provider regarding referral to pain management. []   1414 Discussed with Dr. Murray. He has seen the patient.  Patient instructed to f/u with urologist.  Discussed sx and red flags that would warrant return to the ED.  [ML]   1420 PT had rocephin ordered but her ride was here and she left before getting it. Patient was dc home with omnicef.   [ML]      ED Course User Index  [KP] Spencer Murray MD  [ML] Georgie Mckenzie PA                                           MDM  Number of Diagnoses or Management Options     Amount and/or Complexity of Data Reviewed  Clinical lab tests: reviewed and ordered  Tests in the radiology section of CPT®: reviewed and ordered  Discuss the patient with other providers: yes    Risk of Complications, Morbidity, and/or Mortality  Presenting problems: low  Diagnostic procedures: low  Management options: low    Patient Progress  Patient progress: improved      Final diagnoses:   Pyelonephritis       ED Disposition  ED Disposition     ED Disposition   Discharge    Condition   Stable    Comment   --             Eddie Latif, APRN  [address] [facility] [address] [phone]    Schedule an appointment as soon as possible for a visit in 1 day      Milan Motley MD  [address] [id number] [phone]    Schedule an appointment as soon as possible for a visit in 1 day           Medication List      New Prescriptions    cefdinir 300 MG capsule  Commonly known as: OMNICEF  Take 1 capsule by mouth 2 (Two) Times a Day for 10 days.     ondansetron ODT 4 MG disintegrating tablet  Commonly known as: ZOFRAN-ODT  Place 1 tablet on the tongue Every 6 (Six) Hours As Needed for Vomiting.     tamsulosin 0.4 MG capsule 24 hr capsule  Commonly known as: FLOMAX  Take 1 capsule by mouth Daily.         Changed    * HYDROcodone-acetaminophen 5-325 MG per tablet  Commonly known as: NORCO  Take 1 tablet by mouth Every 4 (Four) Hours As Needed for Mild Pain .  What changed: Another medication with the same name was added. Make sure you understand how and when to take each.     * HYDROcodone-acetaminophen 5-325 MG per tablet  Commonly known as: NORCO  Take 1 tablet by mouth Every 6 (Six) Hours As Needed for Mild Pain .  What changed: You were already taking a medication with the same name, and this prescription was added. Make sure you understand how and when to take each.         * This list has 2 medication(s) that are the same as other medications prescribed for you. Read the directions carefully, and ask your doctor or other care provider to review them with you.               Where to Get Your Medications      These medications were sent to [address] [phone]  - [phone] [address] [address]    Phone: [phone]   · cefdinir 300 MG capsule  · HYDROcodone-acetaminophen 5-325 MG per tablet  · ondansetron ODT 4 MG disintegrating tablet  · tamsulosin 0.4 MG capsule 24 hr capsule          Georgie Mckenzie PA  06/24/22 8091       Georgie Mckenzie PA  06/24/22 3030

## 2022-06-24 NOTE — TELEPHONE ENCOUNTER
I called the pt to let her know that Tolu is out of the office and will not be back until next week

## 2022-06-25 LAB — BACTERIA SPEC AEROBE CULT: NORMAL

## 2022-06-29 ENCOUNTER — APPOINTMENT (OUTPATIENT)
Dept: ULTRASOUND IMAGING | Facility: HOSPITAL | Age: 37
End: 2022-06-29

## 2022-06-29 ENCOUNTER — ANESTHESIA EVENT (OUTPATIENT)
Dept: PERIOP | Facility: HOSPITAL | Age: 37
End: 2022-06-29

## 2022-06-29 ENCOUNTER — ANESTHESIA (OUTPATIENT)
Dept: PERIOP | Facility: HOSPITAL | Age: 37
End: 2022-06-29

## 2022-06-29 ENCOUNTER — APPOINTMENT (OUTPATIENT)
Dept: GENERAL RADIOLOGY | Facility: HOSPITAL | Age: 37
End: 2022-06-29

## 2022-06-29 ENCOUNTER — TELEPHONE (OUTPATIENT)
Dept: UROLOGY | Facility: CLINIC | Age: 37
End: 2022-06-29

## 2022-06-29 ENCOUNTER — HOSPITAL ENCOUNTER (OUTPATIENT)
Facility: HOSPITAL | Age: 37
Discharge: HOME | End: 2022-06-29
Attending: STUDENT IN AN ORGANIZED HEALTH CARE EDUCATION/TRAINING PROGRAM | Admitting: UROLOGY

## 2022-06-29 VITALS
DIASTOLIC BLOOD PRESSURE: 88 MMHG | HEIGHT: 64 IN | TEMPERATURE: 98.6 F | RESPIRATION RATE: 18 BRPM | HEART RATE: 95 BPM | WEIGHT: 260 LBS | SYSTOLIC BLOOD PRESSURE: 142 MMHG | OXYGEN SATURATION: 98 % | BODY MASS INDEX: 44.39 KG/M2

## 2022-06-29 DIAGNOSIS — N15.1 PERINEPHRIC ABSCESS: ICD-10-CM

## 2022-06-29 DIAGNOSIS — N20.1 URETEROLITHIASIS: Primary | ICD-10-CM

## 2022-06-29 DIAGNOSIS — E66.01 SEVERE OBESITY: ICD-10-CM

## 2022-06-29 DIAGNOSIS — N30.00 ACUTE CYSTITIS WITHOUT HEMATURIA: ICD-10-CM

## 2022-06-29 LAB
ALBUMIN SERPL-MCNC: 4.52 G/DL (ref 3.5–5.2)
ALBUMIN/GLOB SERPL: 1.3 G/DL
ALP SERPL-CCNC: 126 U/L (ref 39–117)
ALT SERPL W P-5'-P-CCNC: 39 U/L (ref 1–33)
ANION GAP SERPL CALCULATED.3IONS-SCNC: 17.3 MMOL/L (ref 5–15)
APTT PPP: 27.8 SECONDS (ref 26.5–34.5)
AST SERPL-CCNC: 44 U/L (ref 1–32)
B-HCG UR QL: NEGATIVE
BACTERIA UR QL AUTO: ABNORMAL /HPF
BASOPHILS # BLD AUTO: 0.04 10*3/MM3 (ref 0–0.2)
BASOPHILS NFR BLD AUTO: 0.8 % (ref 0–1.5)
BILIRUB SERPL-MCNC: 0.4 MG/DL (ref 0–1.2)
BILIRUB UR QL STRIP: NEGATIVE
BUN SERPL-MCNC: 14 MG/DL (ref 6–20)
BUN/CREAT SERPL: 20.3 (ref 7–25)
CALCIUM SPEC-SCNC: 9.8 MG/DL (ref 8.6–10.5)
CHLORIDE SERPL-SCNC: 99 MMOL/L (ref 98–107)
CLARITY UR: ABNORMAL
CO2 SERPL-SCNC: 18.7 MMOL/L (ref 22–29)
COLOR UR: YELLOW
CREAT SERPL-MCNC: 0.69 MG/DL (ref 0.57–1)
CRP SERPL-MCNC: 1.68 MG/DL (ref 0–0.5)
DEPRECATED RDW RBC AUTO: 43.6 FL (ref 37–54)
EGFRCR SERPLBLD CKD-EPI 2021: 115.5 ML/MIN/1.73
EOSINOPHIL # BLD AUTO: 0.09 10*3/MM3 (ref 0–0.4)
EOSINOPHIL NFR BLD AUTO: 1.8 % (ref 0.3–6.2)
ERYTHROCYTE [DISTWIDTH] IN BLOOD BY AUTOMATED COUNT: 13 % (ref 12.3–15.4)
FLUAV RNA RESP QL NAA+PROBE: NOT DETECTED
FLUBV RNA RESP QL NAA+PROBE: NOT DETECTED
GLOBULIN UR ELPH-MCNC: 3.5 GM/DL
GLUCOSE BLDC GLUCOMTR-MCNC: 160 MG/DL (ref 70–130)
GLUCOSE SERPL-MCNC: 221 MG/DL (ref 65–99)
GLUCOSE UR STRIP-MCNC: ABNORMAL MG/DL
HCT VFR BLD AUTO: 38 % (ref 34–46.6)
HGB BLD-MCNC: 13.2 G/DL (ref 12–15.9)
HGB UR QL STRIP.AUTO: NEGATIVE
HOLD SPECIMEN: NORMAL
HOLD SPECIMEN: NORMAL
HYALINE CASTS UR QL AUTO: ABNORMAL /LPF
IMM GRANULOCYTES # BLD AUTO: 0.01 10*3/MM3 (ref 0–0.05)
IMM GRANULOCYTES NFR BLD AUTO: 0.2 % (ref 0–0.5)
INR PPP: 0.96 (ref 0.9–1.1)
KETONES UR QL STRIP: ABNORMAL
LEUKOCYTE ESTERASE UR QL STRIP.AUTO: ABNORMAL
LYMPHOCYTES # BLD AUTO: 1.49 10*3/MM3 (ref 0.7–3.1)
LYMPHOCYTES NFR BLD AUTO: 30.2 % (ref 19.6–45.3)
MCH RBC QN AUTO: 32.4 PG (ref 26.6–33)
MCHC RBC AUTO-ENTMCNC: 34.7 G/DL (ref 31.5–35.7)
MCV RBC AUTO: 93.1 FL (ref 79–97)
MONOCYTES # BLD AUTO: 0.42 10*3/MM3 (ref 0.1–0.9)
MONOCYTES NFR BLD AUTO: 8.5 % (ref 5–12)
NEUTROPHILS NFR BLD AUTO: 2.88 10*3/MM3 (ref 1.7–7)
NEUTROPHILS NFR BLD AUTO: 58.5 % (ref 42.7–76)
NITRITE UR QL STRIP: NEGATIVE
NRBC BLD AUTO-RTO: 0 /100 WBC (ref 0–0.2)
PH UR STRIP.AUTO: <=5 [PH] (ref 5–8)
PLATELET # BLD AUTO: 214 10*3/MM3 (ref 140–450)
PMV BLD AUTO: 8.6 FL (ref 6–12)
POTASSIUM SERPL-SCNC: 4.5 MMOL/L (ref 3.5–5.2)
PROT SERPL-MCNC: 8 G/DL (ref 6–8.5)
PROT UR QL STRIP: ABNORMAL
PROTHROMBIN TIME: 13 SECONDS (ref 12.1–14.7)
RBC # BLD AUTO: 4.08 10*6/MM3 (ref 3.77–5.28)
RBC # UR STRIP: ABNORMAL /HPF
REF LAB TEST METHOD: ABNORMAL
SARS-COV-2 RNA RESP QL NAA+PROBE: NOT DETECTED
SODIUM SERPL-SCNC: 135 MMOL/L (ref 136–145)
SP GR UR STRIP: 1.02 (ref 1–1.03)
SQUAMOUS #/AREA URNS HPF: ABNORMAL /HPF
UROBILINOGEN UR QL STRIP: ABNORMAL
WBC # UR STRIP: ABNORMAL /HPF
WBC NRBC COR # BLD: 4.93 10*3/MM3 (ref 3.4–10.8)
WHOLE BLOOD HOLD COAG: NORMAL
WHOLE BLOOD HOLD SPECIMEN: NORMAL
YEAST URNS QL MICRO: ABNORMAL /HPF

## 2022-06-29 PROCEDURE — C1758 CATHETER, URETERAL: HCPCS | Performed by: UROLOGY

## 2022-06-29 PROCEDURE — 81025 URINE PREGNANCY TEST: CPT | Performed by: STUDENT IN AN ORGANIZED HEALTH CARE EDUCATION/TRAINING PROGRAM

## 2022-06-29 PROCEDURE — 86140 C-REACTIVE PROTEIN: CPT | Performed by: STUDENT IN AN ORGANIZED HEALTH CARE EDUCATION/TRAINING PROGRAM

## 2022-06-29 PROCEDURE — 52351 CYSTOURETERO & OR PYELOSCOPE: CPT | Performed by: UROLOGY

## 2022-06-29 PROCEDURE — 85610 PROTHROMBIN TIME: CPT | Performed by: STUDENT IN AN ORGANIZED HEALTH CARE EDUCATION/TRAINING PROGRAM

## 2022-06-29 PROCEDURE — 82962 GLUCOSE BLOOD TEST: CPT

## 2022-06-29 PROCEDURE — 25010000002 CEFTRIAXONE PER 250 MG: Performed by: STUDENT IN AN ORGANIZED HEALTH CARE EDUCATION/TRAINING PROGRAM

## 2022-06-29 PROCEDURE — 81001 URINALYSIS AUTO W/SCOPE: CPT | Performed by: STUDENT IN AN ORGANIZED HEALTH CARE EDUCATION/TRAINING PROGRAM

## 2022-06-29 PROCEDURE — 25010000002 DEXAMETHASONE PER 1 MG: Performed by: NURSE ANESTHETIST, CERTIFIED REGISTERED

## 2022-06-29 PROCEDURE — 99285 EMERGENCY DEPT VISIT HI MDM: CPT

## 2022-06-29 PROCEDURE — 25010000002 MIDAZOLAM PER 1 MG: Performed by: ANESTHESIOLOGY

## 2022-06-29 PROCEDURE — 25010000002 FENTANYL CITRATE (PF) 50 MCG/ML SOLUTION: Performed by: ANESTHESIOLOGY

## 2022-06-29 PROCEDURE — 76775 US EXAM ABDO BACK WALL LIM: CPT

## 2022-06-29 PROCEDURE — 52332 CYSTOSCOPY AND TREATMENT: CPT | Performed by: UROLOGY

## 2022-06-29 PROCEDURE — 99213 OFFICE O/P EST LOW 20 MIN: CPT | Performed by: UROLOGY

## 2022-06-29 PROCEDURE — 25010000002 HYDROMORPHONE PER 4 MG: Performed by: STUDENT IN AN ORGANIZED HEALTH CARE EDUCATION/TRAINING PROGRAM

## 2022-06-29 PROCEDURE — C2617 STENT, NON-COR, TEM W/O DEL: HCPCS | Performed by: UROLOGY

## 2022-06-29 PROCEDURE — C9803 HOPD COVID-19 SPEC COLLECT: HCPCS

## 2022-06-29 PROCEDURE — 85025 COMPLETE CBC W/AUTO DIFF WBC: CPT | Performed by: STUDENT IN AN ORGANIZED HEALTH CARE EDUCATION/TRAINING PROGRAM

## 2022-06-29 PROCEDURE — 85730 THROMBOPLASTIN TIME PARTIAL: CPT | Performed by: STUDENT IN AN ORGANIZED HEALTH CARE EDUCATION/TRAINING PROGRAM

## 2022-06-29 PROCEDURE — 80053 COMPREHEN METABOLIC PANEL: CPT | Performed by: STUDENT IN AN ORGANIZED HEALTH CARE EDUCATION/TRAINING PROGRAM

## 2022-06-29 PROCEDURE — 25010000002 GENTAMICIN PER 80 MG: Performed by: UROLOGY

## 2022-06-29 PROCEDURE — 96375 TX/PRO/DX INJ NEW DRUG ADDON: CPT

## 2022-06-29 PROCEDURE — 25010000002 ONDANSETRON PER 1 MG: Performed by: NURSE ANESTHETIST, CERTIFIED REGISTERED

## 2022-06-29 PROCEDURE — 0 MEPERIDINE PER 100 MG: Performed by: NURSE ANESTHETIST, CERTIFIED REGISTERED

## 2022-06-29 PROCEDURE — 25010000002 IOPAMIDOL 61 % SOLUTION: Performed by: UROLOGY

## 2022-06-29 PROCEDURE — 25010000002 PROPOFOL 10 MG/ML EMULSION: Performed by: NURSE ANESTHETIST, CERTIFIED REGISTERED

## 2022-06-29 PROCEDURE — 74018 RADEX ABDOMEN 1 VIEW: CPT

## 2022-06-29 PROCEDURE — C1769 GUIDE WIRE: HCPCS | Performed by: UROLOGY

## 2022-06-29 PROCEDURE — 74018 RADEX ABDOMEN 1 VIEW: CPT | Performed by: RADIOLOGY

## 2022-06-29 PROCEDURE — 25010000002 FENTANYL CITRATE (PF) 50 MCG/ML SOLUTION: Performed by: NURSE ANESTHETIST, CERTIFIED REGISTERED

## 2022-06-29 PROCEDURE — 96365 THER/PROPH/DIAG IV INF INIT: CPT

## 2022-06-29 PROCEDURE — 76775 US EXAM ABDO BACK WALL LIM: CPT | Performed by: RADIOLOGY

## 2022-06-29 PROCEDURE — 87636 SARSCOV2 & INF A&B AMP PRB: CPT | Performed by: STUDENT IN AN ORGANIZED HEALTH CARE EDUCATION/TRAINING PROGRAM

## 2022-06-29 DEVICE — URETERAL STENT
Type: IMPLANTABLE DEVICE | Site: URETER | Status: FUNCTIONAL
Brand: POLARIS™ ULTRA

## 2022-06-29 RX ORDER — IPRATROPIUM BROMIDE AND ALBUTEROL SULFATE 2.5; .5 MG/3ML; MG/3ML
3 SOLUTION RESPIRATORY (INHALATION) ONCE AS NEEDED
Status: DISCONTINUED | OUTPATIENT
Start: 2022-06-29 | End: 2022-06-29 | Stop reason: HOSPADM

## 2022-06-29 RX ORDER — MAGNESIUM HYDROXIDE 1200 MG/15ML
LIQUID ORAL AS NEEDED
Status: DISCONTINUED | OUTPATIENT
Start: 2022-06-29 | End: 2022-06-29 | Stop reason: HOSPADM

## 2022-06-29 RX ORDER — ONDANSETRON 2 MG/ML
INJECTION INTRAMUSCULAR; INTRAVENOUS AS NEEDED
Status: DISCONTINUED | OUTPATIENT
Start: 2022-06-29 | End: 2022-06-29 | Stop reason: SURG

## 2022-06-29 RX ORDER — FENTANYL CITRATE 50 UG/ML
100 INJECTION, SOLUTION INTRAMUSCULAR; INTRAVENOUS
Status: COMPLETED | OUTPATIENT
Start: 2022-06-29 | End: 2022-06-29

## 2022-06-29 RX ORDER — GENTAMICIN SULFATE 80 MG/100ML
80 INJECTION, SOLUTION INTRAVENOUS ONCE
Status: COMPLETED | OUTPATIENT
Start: 2022-06-29 | End: 2022-06-29

## 2022-06-29 RX ORDER — OXYCODONE AND ACETAMINOPHEN 10; 325 MG/1; MG/1
1 TABLET ORAL ONCE
Status: COMPLETED | OUTPATIENT
Start: 2022-06-29 | End: 2022-06-29

## 2022-06-29 RX ORDER — DEXAMETHASONE SODIUM PHOSPHATE 10 MG/ML
INJECTION INTRAMUSCULAR; INTRAVENOUS AS NEEDED
Status: DISCONTINUED | OUTPATIENT
Start: 2022-06-29 | End: 2022-06-29 | Stop reason: SURG

## 2022-06-29 RX ORDER — SODIUM CHLORIDE 0.9 % (FLUSH) 0.9 %
10 SYRINGE (ML) INJECTION EVERY 12 HOURS SCHEDULED
Status: DISCONTINUED | OUTPATIENT
Start: 2022-06-29 | End: 2022-06-29 | Stop reason: HOSPADM

## 2022-06-29 RX ORDER — LIDOCAINE HYDROCHLORIDE 20 MG/ML
INJECTION, SOLUTION INFILTRATION; PERINEURAL AS NEEDED
Status: DISCONTINUED | OUTPATIENT
Start: 2022-06-29 | End: 2022-06-29 | Stop reason: SURG

## 2022-06-29 RX ORDER — MIDAZOLAM HYDROCHLORIDE 1 MG/ML
1 INJECTION INTRAMUSCULAR; INTRAVENOUS
Status: DISCONTINUED | OUTPATIENT
Start: 2022-06-29 | End: 2022-06-29 | Stop reason: HOSPADM

## 2022-06-29 RX ORDER — FENTANYL CITRATE 50 UG/ML
50 INJECTION, SOLUTION INTRAMUSCULAR; INTRAVENOUS
Status: COMPLETED | OUTPATIENT
Start: 2022-06-29 | End: 2022-06-29

## 2022-06-29 RX ORDER — HYDROMORPHONE HYDROCHLORIDE 1 MG/ML
0.5 INJECTION, SOLUTION INTRAMUSCULAR; INTRAVENOUS; SUBCUTANEOUS ONCE
Status: COMPLETED | OUTPATIENT
Start: 2022-06-29 | End: 2022-06-29

## 2022-06-29 RX ORDER — OXYCODONE AND ACETAMINOPHEN 10; 325 MG/1; MG/1
1 TABLET ORAL EVERY 4 HOURS PRN
Qty: 12 TABLET | Refills: 0 | OUTPATIENT
Start: 2022-06-29 | End: 2022-07-23

## 2022-06-29 RX ORDER — HYDROCODONE BITARTRATE AND ACETAMINOPHEN 10; 325 MG/1; MG/1
1 TABLET ORAL EVERY 4 HOURS PRN
Status: DISCONTINUED | OUTPATIENT
Start: 2022-06-29 | End: 2022-06-29

## 2022-06-29 RX ORDER — HYDROCODONE BITARTRATE AND ACETAMINOPHEN 10; 325 MG/1; MG/1
1 TABLET ORAL ONCE
Status: COMPLETED | OUTPATIENT
Start: 2022-06-29 | End: 2022-06-29

## 2022-06-29 RX ORDER — OXYCODONE HYDROCHLORIDE AND ACETAMINOPHEN 5; 325 MG/1; MG/1
1 TABLET ORAL ONCE AS NEEDED
Status: COMPLETED | OUTPATIENT
Start: 2022-06-29 | End: 2022-06-29

## 2022-06-29 RX ORDER — SODIUM CHLORIDE, SODIUM LACTATE, POTASSIUM CHLORIDE, CALCIUM CHLORIDE 600; 310; 30; 20 MG/100ML; MG/100ML; MG/100ML; MG/100ML
125 INJECTION, SOLUTION INTRAVENOUS ONCE
Status: DISCONTINUED | OUTPATIENT
Start: 2022-06-29 | End: 2022-06-29 | Stop reason: HOSPADM

## 2022-06-29 RX ORDER — ONDANSETRON 2 MG/ML
4 INJECTION INTRAMUSCULAR; INTRAVENOUS AS NEEDED
Status: DISCONTINUED | OUTPATIENT
Start: 2022-06-29 | End: 2022-06-29 | Stop reason: HOSPADM

## 2022-06-29 RX ORDER — MEPERIDINE HYDROCHLORIDE 25 MG/ML
12.5 INJECTION INTRAMUSCULAR; INTRAVENOUS; SUBCUTANEOUS
Status: COMPLETED | OUTPATIENT
Start: 2022-06-29 | End: 2022-06-29

## 2022-06-29 RX ORDER — HYDROMORPHONE HYDROCHLORIDE 1 MG/ML
0.5 INJECTION, SOLUTION INTRAMUSCULAR; INTRAVENOUS; SUBCUTANEOUS EVERY 4 HOURS PRN
Status: DISCONTINUED | OUTPATIENT
Start: 2022-06-29 | End: 2022-06-29 | Stop reason: HOSPADM

## 2022-06-29 RX ORDER — SODIUM CHLORIDE, SODIUM LACTATE, POTASSIUM CHLORIDE, CALCIUM CHLORIDE 600; 310; 30; 20 MG/100ML; MG/100ML; MG/100ML; MG/100ML
100 INJECTION, SOLUTION INTRAVENOUS ONCE AS NEEDED
Status: DISCONTINUED | OUTPATIENT
Start: 2022-06-29 | End: 2022-06-29 | Stop reason: HOSPADM

## 2022-06-29 RX ORDER — PROPOFOL 10 MG/ML
VIAL (ML) INTRAVENOUS AS NEEDED
Status: DISCONTINUED | OUTPATIENT
Start: 2022-06-29 | End: 2022-06-29 | Stop reason: SURG

## 2022-06-29 RX ORDER — SODIUM CHLORIDE 0.9 % (FLUSH) 0.9 %
10 SYRINGE (ML) INJECTION AS NEEDED
Status: DISCONTINUED | OUTPATIENT
Start: 2022-06-29 | End: 2022-06-29 | Stop reason: HOSPADM

## 2022-06-29 RX ADMIN — FENTANYL CITRATE 50 MCG: 50 INJECTION INTRAMUSCULAR; INTRAVENOUS at 15:31

## 2022-06-29 RX ADMIN — OXYCODONE HYDROCHLORIDE AND ACETAMINOPHEN 1 TABLET: 5; 325 TABLET ORAL at 16:34

## 2022-06-29 RX ADMIN — FENTANYL CITRATE 50 MCG: 50 INJECTION INTRAMUSCULAR; INTRAVENOUS at 15:40

## 2022-06-29 RX ADMIN — HYDROMORPHONE HYDROCHLORIDE 0.5 MG: 1 INJECTION, SOLUTION INTRAMUSCULAR; INTRAVENOUS; SUBCUTANEOUS at 10:32

## 2022-06-29 RX ADMIN — MEPERIDINE HYDROCHLORIDE 12.5 MG: 25 INJECTION, SOLUTION INTRAMUSCULAR; INTRAVENOUS; SUBCUTANEOUS at 15:59

## 2022-06-29 RX ADMIN — PROPOFOL 200 MG: 10 INJECTION, EMULSION INTRAVENOUS at 15:19

## 2022-06-29 RX ADMIN — MIDAZOLAM 2 MG: 1 INJECTION INTRAMUSCULAR; INTRAVENOUS at 15:16

## 2022-06-29 RX ADMIN — SODIUM CHLORIDE 1000 ML: 9 INJECTION, SOLUTION INTRAVENOUS at 07:59

## 2022-06-29 RX ADMIN — HYDROCODONE BITARTRATE AND ACETAMINOPHEN 1 TABLET: 10; 325 TABLET ORAL at 07:59

## 2022-06-29 RX ADMIN — FENTANYL CITRATE 50 MCG: 50 INJECTION INTRAMUSCULAR; INTRAVENOUS at 16:11

## 2022-06-29 RX ADMIN — CEFTRIAXONE 1 G: 1 INJECTION, POWDER, FOR SOLUTION INTRAMUSCULAR; INTRAVENOUS at 12:16

## 2022-06-29 RX ADMIN — OXYCODONE HYDROCHLORIDE AND ACETAMINOPHEN 1 TABLET: 10; 325 TABLET ORAL at 09:44

## 2022-06-29 RX ADMIN — ONDANSETRON 4 MG: 2 INJECTION INTRAMUSCULAR; INTRAVENOUS at 15:26

## 2022-06-29 RX ADMIN — GENTAMICIN SULFATE 80 MG: 80 INJECTION, SOLUTION INTRAVENOUS at 15:16

## 2022-06-29 RX ADMIN — DEXAMETHASONE SODIUM PHOSPHATE 8 MG: 10 INJECTION INTRAMUSCULAR; INTRAVENOUS at 15:26

## 2022-06-29 RX ADMIN — FENTANYL CITRATE 50 MCG: 50 INJECTION INTRAMUSCULAR; INTRAVENOUS at 15:58

## 2022-06-29 RX ADMIN — MEPERIDINE HYDROCHLORIDE 12.5 MG: 25 INJECTION, SOLUTION INTRAMUSCULAR; INTRAVENOUS; SUBCUTANEOUS at 16:12

## 2022-06-29 RX ADMIN — SODIUM CHLORIDE 1000 ML: 9 INJECTION, SOLUTION INTRAVENOUS at 09:45

## 2022-06-29 RX ADMIN — FENTANYL CITRATE 100 MCG: 50 INJECTION INTRAMUSCULAR; INTRAVENOUS at 13:48

## 2022-06-29 RX ADMIN — LIDOCAINE HYDROCHLORIDE 100 MG: 20 INJECTION, SOLUTION INFILTRATION; PERINEURAL at 15:19

## 2022-06-29 NOTE — ANESTHESIA PROCEDURE NOTES
Airway  Date/Time: 6/29/2022 3:21 PM  Airway not difficult    General Information and Staff    Patient location during procedure: OR  CRNA/CAA: Jennie Bruno CRNA    Indications and Patient Condition  Indications for airway management: airway protection    Preoxygenated: yes  MILS maintained throughout  Mask difficulty assessment: 0 - not attempted    Final Airway Details  Final airway type: supraglottic airway      Successful airway: classic  Size 4    Number of attempts at approach: 1  Assessment: lips, teeth, and gum same as pre-op and atraumatic intubation

## 2022-06-29 NOTE — ANESTHESIA POSTPROCEDURE EVALUATION
Patient: Natalia Arzate    Procedure Summary     Date: 06/29/22 Room / Location:  [facility] OR 06 /  [facility]    Anesthesia Start: 1517 Anesthesia Stop: 1546    Procedure: CYSTOSCOPY RETROGRADE LEFT WITH STENT PLACEMENT (Left ) Diagnosis:       Severe obesity (HCC)      (Severe obesity (HCC) [E66.01])    Surgeons: Milan Motley MD Provider: Will Mckeon MD    Anesthesia Type: general ASA Status: 3          Anesthesia Type: general    Vitals  Vitals Value Taken Time   /103 06/29/22 1602   Temp 98.5 °F (36.9 °C) 06/29/22 1547   Pulse 95 06/29/22 1602   Resp 12 06/29/22 1602   SpO2 95 % 06/29/22 1602           Post Anesthesia Care and Evaluation    Patient location during evaluation: bedside  Patient participation: complete - patient participated  Level of consciousness: awake and alert  Pain score: 1  Pain management: adequate    Airway patency: patent  Anesthetic complications: No anesthetic complications  PONV Status: none  Cardiovascular status: acceptable  Respiratory status: acceptable  Hydration status: acceptable

## 2022-06-29 NOTE — ANESTHESIA PREPROCEDURE EVALUATION
Anesthesia Evaluation     Patient summary reviewed and Nursing notes reviewed   no history of anesthetic complications:  NPO Solid Status: > 8 hours  NPO Liquid Status: > 8 hours           Airway   Mallampati: II  TM distance: >3 FB  Neck ROM: full  No difficulty expected  Dental    (+) poor dentition        Pulmonary     breath sounds clear to auscultation  (+) pulmonary embolism, asthma,  Cardiovascular     Rhythm: regular  Rate: normal    (+) hypertension, dysrhythmias, DVT, hyperlipidemia,       Neuro/Psych  (+) headaches, numbness, psychiatric history,    (-) TIA  GI/Hepatic/Renal/Endo    (+) obesity, morbid obesity, GERD,  renal disease, diabetes mellitus, thyroid problem hypothyroidism    Musculoskeletal     Abdominal     Abdomen: soft.   Substance History      OB/GYN    (+) Preeclampsia, pregnancy induced hypertension        Other      history of cancer                      Anesthesia Plan    ASA 3     general     intravenous induction     Anesthetic plan, risks, benefits, and alternatives have been provided, discussed and informed consent has been obtained with: patient.    Plan discussed with CRNA.

## 2022-07-01 ENCOUNTER — TELEMEDICINE (OUTPATIENT)
Dept: FAMILY MEDICINE CLINIC | Facility: CLINIC | Age: 37
End: 2022-07-01

## 2022-07-01 VITALS — BODY MASS INDEX: 44.39 KG/M2 | HEIGHT: 64 IN | WEIGHT: 260 LBS

## 2022-07-01 DIAGNOSIS — Z79.4 TYPE 2 DIABETES MELLITUS WITH HYPERGLYCEMIA, WITH LONG-TERM CURRENT USE OF INSULIN: Chronic | ICD-10-CM

## 2022-07-01 DIAGNOSIS — E11.65 TYPE 2 DIABETES MELLITUS WITH HYPERGLYCEMIA, WITH LONG-TERM CURRENT USE OF INSULIN: Chronic | ICD-10-CM

## 2022-07-01 DIAGNOSIS — I63.9 ISCHEMIC CEREBROVASCULAR ACCIDENT (CVA): Chronic | ICD-10-CM

## 2022-07-01 DIAGNOSIS — E78.5 DYSLIPIDEMIA: Chronic | ICD-10-CM

## 2022-07-01 DIAGNOSIS — E53.8 VITAMIN B 12 DEFICIENCY: ICD-10-CM

## 2022-07-01 DIAGNOSIS — D68.51 FACTOR 5 LEIDEN MUTATION, HETEROZYGOUS: Chronic | ICD-10-CM

## 2022-07-01 DIAGNOSIS — M10.9 GOUT, UNSPECIFIED CAUSE, UNSPECIFIED CHRONICITY, UNSPECIFIED SITE: ICD-10-CM

## 2022-07-01 DIAGNOSIS — E03.9 HYPOTHYROIDISM, UNSPECIFIED TYPE: Chronic | ICD-10-CM

## 2022-07-01 DIAGNOSIS — I10 PRIMARY HYPERTENSION: Chronic | ICD-10-CM

## 2022-07-01 DIAGNOSIS — J45.901 MODERATE ASTHMA WITH ACUTE EXACERBATION, UNSPECIFIED WHETHER PERSISTENT: Primary | Chronic | ICD-10-CM

## 2022-07-01 DIAGNOSIS — R49.0 HOARSENESS, PERSISTENT: ICD-10-CM

## 2022-07-01 DIAGNOSIS — E55.9 VITAMIN D DEFICIENCY: ICD-10-CM

## 2022-07-01 PROCEDURE — 99214 OFFICE O/P EST MOD 30 MIN: CPT | Performed by: NURSE PRACTITIONER

## 2022-07-01 RX ORDER — SITAGLIPTIN AND METFORMIN HYDROCHLORIDE 500; 50 MG/1; MG/1
TABLET, FILM COATED ORAL
Qty: 60 TABLET | Refills: 0 | Status: SHIPPED | OUTPATIENT
Start: 2022-07-01 | End: 2022-12-01

## 2022-07-01 RX ORDER — NEBULIZER ACCESSORIES
1 KIT MISCELLANEOUS TAKE AS DIRECTED
Qty: 1 EACH | Refills: 0 | Status: SHIPPED | OUTPATIENT
Start: 2022-07-01 | End: 2022-07-01 | Stop reason: SDUPTHER

## 2022-07-01 RX ORDER — NEBULIZER ACCESSORIES
1 KIT MISCELLANEOUS TAKE AS DIRECTED
Qty: 1 EACH | Refills: 0 | Status: SHIPPED | OUTPATIENT
Start: 2022-07-01 | End: 2022-12-01 | Stop reason: SDUPTHER

## 2022-07-01 RX ORDER — ALBUTEROL SULFATE 2.5 MG/3ML
2.5 SOLUTION RESPIRATORY (INHALATION) EVERY 6 HOURS PRN
Qty: 150 ML | Refills: 3 | Status: SHIPPED | OUTPATIENT
Start: 2022-07-01

## 2022-07-01 NOTE — PROGRESS NOTES
You have chosen to receive care through a telehealth visit.  Do you consent to use a video/audio connection for your medical care today? Yes    History of Present Illness  Natalia Arzate is a 37 y.o.female who presents to the clinic via video for follow up and complaining of upper respiratory symptoms related to her asthma.  She was hospitalized at [facility] after a very lengthy hospitalization from 12/26/2021 until she was discharged on 2/15/2022.  She was admitted for a disseminated infection.  She presented emergently at [facility] on 12/26/2021 following a fall at home. She was transferred to . She was  diagnosed with L Pyelonephritis complicated by multiple abscesses as well as superinfection of L iliopsoas hematoma on admission at [facility]. During admission, she developed acute hypoxic respiratory failure presumed due to aspiration and septic shock. She was transferred to ICU. She was intubated and required CRRT/HD while in ICU. Eventually transferred to the floor no longer requiring HD. During hospitalization, she had an acute/subacute R cerebellar ischemic stroke on 1/26/2022 with recommendation to continue on Anticoagulation.   Natalia has been diagnosed with Factor V Leiden, DM, type 2 with neuropathy currently treated with insulin.  In addition, she has HTN, Dyslipidemia, Hypothyroidism, Renal Stones and Gout.     She has been dismissed from  Medical care and is being followed by Dr Motley, Urologist and Dr Garrison, Hematologist.  Notes have been reviewed.  She had an  ENT appointment scheduled but it was rescheduled due to weather impact on the computer system. She was to be evaluated due to her persistent hoarseness after lengthy Vent with [facility] ENT.      Diabetes  She has type 2 diabetes mellitus treated with insulin both basal and fast acting. She is using a DexCom which is helping her.  Risk factors for coronary artery disease include diabetes mellitus, dyslipidemia,  hypertension, obesity, stress and sedentary lifestyle. She reports her blood sugars have been elevated in the 200s.    Lab Results   Component Value Date    HGBA1C 5.60 04/10/2022     Hypertension  Currently taking Amlodipine and Metoprolol.  Her blood pressure has been elevated over the last month.  Lab Results   Component Value Date    GLUCOSE 221 (H) 06/29/2022    BUN 14 06/29/2022    CREATININE 0.69 06/29/2022    BCR 20.3 06/29/2022    K 4.5 06/29/2022    CO2 18.7 (L) 06/29/2022    CALCIUM 9.8 06/29/2022    ALBUMIN 4.52 06/29/2022    AST 44 (H) 06/29/2022    ALT 39 (H) 06/29/2022     Dyslipidemia  Prescribed Omega Lipid panel is due    Lab Results   Component Value Date    TRIG 373 (H) 01/05/2022    TRIG 282 (H) 01/03/2022    TRIG 434 (H) 12/30/2021     Hypothyroidism  Had previously been prescribed Levothyroxine but her last TSH was in range without medication  Lab Results   Component Value Date    TSH 3.780 03/13/2022      Kidney Disease  Previous Hydronephrosis and obstructing stone.   Natalia had a cystoscopy with left stent placement on June 29, 2022 per Dr. Motley with follow-up on July 5, 2022.    Back Pain  This is a recurrent problem. She has been referred to physical therapy and this is pending insurance preauthorization.  Shares that she continues to use her walker for support during ambulation.  She is unable to go upstairs to her home without great difficulty.    The following portions of the patient's history were reviewed and updated as appropriate: allergies, current medications, past family history, past medical history, past social history, past surgical history and problem list.    Review of Systems   Constitutional: Positive for fatigue. Negative for activity change, appetite change, chills, fever and unexpected weight change.   HENT: Positive for congestion.    Eyes: Negative for visual disturbance.   Respiratory: Positive for cough and wheezing. Negative for shortness of breath.   "  Cardiovascular: Negative for chest pain, palpitations and leg swelling.   Gastrointestinal: Positive for nausea. Negative for abdominal pain, constipation, diarrhea and vomiting.   Endocrine: Negative for cold intolerance, heat intolerance, polydipsia, polyphagia and polyuria.   Musculoskeletal: Positive for arthralgias, back pain and gait problem.   Skin: Negative for color change and rash.   Neurological: Positive for headaches. Negative for dizziness, tremors, speech difficulty, weakness and light-headedness.   Hematological: Negative for adenopathy.   Psychiatric/Behavioral: Negative for confusion, decreased concentration and suicidal ideas. The patient is not nervous/anxious.    All other systems reviewed and are negative.    Vital signs:  Ht 162.6 cm (64.02\")   Wt 118 kg (260 lb)   LMP  (LMP Unknown) Comment: last menses 6 years ago (HCG negative today)  BMI 44.61 kg/m²     Physical Exam  Constitutional:       General: She is not in acute distress.     Comments: Coughing and wheezing throughout visit today.   HENT:      Head: Normocephalic.      Nose: Congestion present.   Eyes:      General: No scleral icterus.        Right eye: No discharge.         Left eye: No discharge.      Conjunctiva/sclera: Conjunctivae normal.   Pulmonary:      Effort: No respiratory distress.   Musculoskeletal:      Cervical back: Neck supple.   Neurological:      Mental Status: She is alert and oriented to person, place, and time.   Psychiatric:         Mood and Affect: Mood and affect normal.         Speech: Speech normal.         Behavior: Behavior is cooperative.         Cognition and Memory: Cognition and memory normal.       Result Review : Multiple ED visits, urology and hematology visits  Class 3 Severe Obesity (BMI >=40). Obesity-related health conditions include the following: hypertension, diabetes mellitus, GERD and Asthma. Obesity is unchanged. BMI  is above average; BMI management plan is completed. We discussed " portion control and increasing exercise.       Assessment & Plan     Diagnoses and all orders for this visit:    1. Moderate asthma with acute exacerbation, unspecified whether persistent (Primary)  Comments:  Nebulizer and supplies prescription sent to [facility].  Albuterol neb solution sent to [facility] pharmacy  Orders:  -     albuterol (PROVENTIL) (2.5 MG/3ML) 0.083% nebulizer solution; Take 2.5 mg by nebulization Every 6 (Six) Hours As Needed for Wheezing or Shortness of Air.  Dispense: 150 mL; Refill: 3  -     Discontinue: Respiratory Therapy Supplies (Nebulizer/Tubing/Mouthpiece) kit; 1 each Take As Directed.  Dispense: 1 each; Refill: 0  -     Home Nebulizer  -     Respiratory Therapy Supplies (Nebulizer/Tubing/Mouthpiece) kit; 1 each Take As Directed.  Dispense: 1 each; Refill: 0  -     CBC & Differential; Future    2. Type 2 diabetes mellitus with hyperglycemia, with long-term current use of insulin (HCC)  Comments:  Continue Lantus increase to 50 units.  Trial of Janumet prescribed.  Discussed a SGLT2.Currently she is having difficulty with her assessability to a bathroom  Orders:  -     sitaGLIPtin-metFORMIN (Janumet)  MG per tablet; Start with one with supper for one week and if tolerated increase to one twice a day  Dispense: 60 tablet; Refill: 0  -     Comprehensive Metabolic Panel  -     Lipid Panel  -     TSH  -     Hemoglobin A1c; Future  -     Vitamin B12; Future    3. Dyslipidemia  Comments:  Lipid panel ordered.  Goal will be to prescribe a statin  Orders:  -     Comprehensive Metabolic Panel  -     Lipid Panel  -     TSH    4. Primary hypertension  Comments:  Blood pressure improving with increasing metoprolol  Orders:  -     Comprehensive Metabolic Panel  -     Lipid Panel  -     TSH  -     Magnesium; Future    5. Hypothyroidism, unspecified type  Comments:  Previously on levothyroxine yet her recent thyroid function has been normal.  We will continue to monitor  Orders:  -      TSH  -     T4, Free; Future    6. Ischemic cerebrovascular accident (CVA) (HCC)  Comments:  OT and PT outpatient referrals have been made.  Pending approval from her insurance    7. Factor 5 Leiden mutation, heterozygous (HCC)  Comments:  Continue Eliquis and follow-up with hematology    8. Vitamin B 12 deficiency  Comments:  Will check vitamin B12 level    9. Vitamin D deficiency  Comments:  Weekly vitamin D  Orders:  -     Vitamin D 25 Hydroxy; Future    10. Hoarseness, persistent  Comments:  Pending ENT appointment    11. Gout, unspecified cause, unspecified chronicity, unspecified site  -     Uric Acid; Future    Follow Up in 4 weeks  Findings and recommendations discussed with Natalia. Reviewed treatment options. Lifestyle modifications reinforced including nutrition and activity recommendations.  Payal will follow up in 4 weeks sooner if problems/concerns occur.  Natalia was given instructions and counseling regarding her condition or for health maintenance advice. Please see specific information pulled into the AVS if appropriate    This document has been electronically signed by:

## 2022-07-03 PROBLEM — K68.3 RETROPERITONEAL HEMATOMA: Status: RESOLVED | Noted: 2021-12-26 | Resolved: 2022-07-03

## 2022-07-03 PROBLEM — K66.1 RETROPERITONEAL HEMATOMA: Status: RESOLVED | Noted: 2021-12-26 | Resolved: 2022-07-03

## 2022-07-03 PROBLEM — E66.01 MORBID OBESITY WITH BMI OF 50.0-59.9, ADULT (HCC): Status: RESOLVED | Noted: 2019-04-25 | Resolved: 2022-07-03

## 2022-07-06 DIAGNOSIS — Z79.4 TYPE 2 DIABETES MELLITUS WITH HYPERGLYCEMIA, WITH LONG-TERM CURRENT USE OF INSULIN: Primary | ICD-10-CM

## 2022-07-06 DIAGNOSIS — E11.65 TYPE 2 DIABETES MELLITUS WITH HYPERGLYCEMIA, WITH LONG-TERM CURRENT USE OF INSULIN: Primary | ICD-10-CM

## 2022-07-06 RX ORDER — INSULIN GLARGINE 100 [IU]/ML
50 INJECTION, SOLUTION SUBCUTANEOUS DAILY
Qty: 15 ML | Refills: 0 | Status: SHIPPED | OUTPATIENT
Start: 2022-07-06 | End: 2022-07-31 | Stop reason: SDUPTHER

## 2022-07-07 ENCOUNTER — PRIOR AUTHORIZATION (OUTPATIENT)
Dept: FAMILY MEDICINE CLINIC | Facility: CLINIC | Age: 37
End: 2022-07-07

## 2022-07-07 ENCOUNTER — OFFICE VISIT (OUTPATIENT)
Dept: UROLOGY | Facility: CLINIC | Age: 37
End: 2022-07-07

## 2022-07-07 VITALS — HEIGHT: 64 IN | BODY MASS INDEX: 44.39 KG/M2 | WEIGHT: 260 LBS

## 2022-07-07 DIAGNOSIS — E11.65 TYPE 2 DIABETES MELLITUS WITH HYPERGLYCEMIA, WITH LONG-TERM CURRENT USE OF INSULIN: Primary | ICD-10-CM

## 2022-07-07 DIAGNOSIS — N20.1 URETERAL CALCULUS: Primary | ICD-10-CM

## 2022-07-07 DIAGNOSIS — Z79.4 TYPE 2 DIABETES MELLITUS WITH HYPERGLYCEMIA, WITH LONG-TERM CURRENT USE OF INSULIN: Primary | ICD-10-CM

## 2022-07-07 DIAGNOSIS — N20.0 RENAL CALCULUS, LEFT: ICD-10-CM

## 2022-07-07 PROCEDURE — 99214 OFFICE O/P EST MOD 30 MIN: CPT | Performed by: UROLOGY

## 2022-07-07 RX ORDER — PROMETHAZINE HYDROCHLORIDE 25 MG/1
25 TABLET ORAL EVERY 6 HOURS PRN
Qty: 21 TABLET | Refills: 2 | Status: SHIPPED | OUTPATIENT
Start: 2022-07-07

## 2022-07-07 RX ORDER — OXYCODONE AND ACETAMINOPHEN 10; 325 MG/1; MG/1
1 TABLET ORAL EVERY 6 HOURS PRN
Qty: 8 TABLET | Refills: 0 | OUTPATIENT
Start: 2022-07-07 | End: 2022-07-23

## 2022-07-07 NOTE — PROGRESS NOTES
Chief Complaint:          Left renal calculus    HPI:   37 y.o. female well-known to me with very metabolically active stone disease had a ureteroscopy and was found to have a tissue mass with actually a stone embedded in it.  I Megan recommend ureteroscopy in [place].  This is beyond the capability of endoscopy services here I discussed this with her at length I refilled her pain medication I will communicate with Dr. Panda Martinez I will follow-up with her based on this      Past Medical History:        Past Medical History:   Diagnosis Date   • A-fib (HCC)    • Abnormal ECG    • Anemia    • Anxiety    • Asthma    • Cancer (HCC)     Ovarian   • Depression    • Diabetes mellitus (HCC)    • DVT (deep venous thrombosis) (HCC)    • Factor 5 Leiden mutation, heterozygous (HCC)    • Fibroid    • GERD (gastroesophageal reflux disease)    • Gout    • H/O abdominal abscess    • History of sepsis    • History of transfusion    • Hyperlipidemia    • Hypothyroid    • Kidney stone    • Migraines    • Neuropathy    • Ovarian cancer (HCC) 02/2021   • Ovarian cyst    • PE (pulmonary embolism)    • Polycystic ovary syndrome    • Preeclampsia    • Rh incompatibility    • Stroke (HCC)    • TIA (transient ischemic attack)    • Urinary tract infection    • Varicella          Current Meds:     Current Outpatient Medications   Medication Sig Dispense Refill   • albuterol (PROVENTIL) (2.5 MG/3ML) 0.083% nebulizer solution Take 2.5 mg by nebulization Every 6 (Six) Hours As Needed for Wheezing or Shortness of Air. 150 mL 3   • albuterol sulfate  (90 Base) MCG/ACT inhaler Inhale 2 puffs Every 4 (Four) Hours As Needed for Wheezing. 18 g 2   • allopurinol (ZYLOPRIM) 100 MG tablet Take 1 tablet by mouth Daily. 30 tablet 0   • amLODIPine (NORVASC) 10 MG tablet Take 1 tablet by mouth Daily. 30 tablet 2   • apixaban (ELIQUIS) 5 MG tablet tablet Take 1 tablet by mouth 2 (Two) Times a Day. 60 tablet 2   • azelastine (ASTELIN) 0.1 % nasal  spray 2 sprays both nostrils twice daily as needed 1 each 2   • cyanocobalamin ([facility] Vitamin B-12) 2000 MCG tablet Take 1 tablet by mouth Daily. 30 tablet 3   • DULoxetine (CYMBALTA) 20 MG capsule Take 1 capsule by mouth Daily. 30 capsule 2   • gabapentin (NEURONTIN) 300 MG capsule Take 1 capsule by mouth 2 (Two) Times a Day for 30 days. 60 capsule 0   • glucose blood test strip 1 each by Other route 3 (Three) Times a Day. 100 each 2   • HYDROcodone-acetaminophen (NORCO) 5-325 MG per tablet Take 1 tablet by mouth Every 4 (Four) Hours As Needed for Mild Pain . 12 tablet 0   • HYDROcodone-acetaminophen (NORCO) 5-325 MG per tablet Take 1 tablet by mouth Every 6 (Six) Hours As Needed for Mild Pain . 5 tablet 0   • insulin glargine (LANTUS, SEMGLEE) 100 UNIT/ML injection Inject 50 Units under the skin into the appropriate area as directed Daily for 30 days. 15 mL 0   • Insulin Pen Needle 30G X 8 MM misc 1 Device Daily. 100 each 0   • Lancets Micro Thin 33G misc 1 Device 3 (Three) Times a Day. 100 each 2   • metoprolol succinate XL (TOPROL-XL) 50 MG 24 hr tablet Take 1 tablet by mouth Daily. 30 tablet 0   • montelukast (SINGULAIR) 10 MG tablet Take 1 tablet by mouth Every Night. 30 tablet 2   • ondansetron ODT (ZOFRAN-ODT) 4 MG disintegrating tablet Place 1 tablet on the tongue Every 6 (Six) Hours As Needed for Vomiting. 12 tablet 0   • oxyCODONE-acetaminophen (Percocet)  MG per tablet Take 1 tablet by mouth Every 4 (Four) Hours As Needed for Moderate Pain. 12 tablet 0   • oxyCODONE-acetaminophen (Percocet)  MG per tablet Take 1 tablet by mouth Every 4 (Four) Hours As Needed for Moderate Pain  (Pain). 12 tablet 0   • polyethylene glycol (MiraLax) 17 g packet Take 17 g by mouth Daily. 30 each 3   • promethazine (PHENERGAN) 25 MG tablet Take 1 tablet by mouth Every 6 (Six) Hours As Needed for Nausea or Vomiting. 21 tablet 2   • promethazine-dextromethorphan (PROMETHAZINE-DM) 6.25-15 MG/5ML syrup Take 5 mL by  mouth 2 (Two) Times a Day As Needed for Cough. 180 mL 1   • Respiratory Therapy Supplies (Nebulizer/Tubing/Mouthpiece) kit 1 each Take As Directed. 1 each 0   • Semaglutide, 1 MG/DOSE, (Ozempic, 1 MG/DOSE,) 4 MG/3ML solution pen-injector Inject 1 mg under the skin into the appropriate area as directed 1 (One) Time Per Week for 4 doses. 1 pen 2   • senna (Senokot) 8.6 MG tablet Take 2 tablets by mouth Daily. 60 tablet 3   • sitaGLIPtin-metFORMIN (Janumet)  MG per tablet Start with one with supper for one week and if tolerated increase to one twice a day 60 tablet 0   • tamsulosin (FLOMAX) 0.4 MG capsule 24 hr capsule Take 1 capsule by mouth Daily. 30 capsule 0   • vitamin D (ERGOCALCIFEROL) 1.25 MG (80577 UT) capsule capsule Take 50,000 Units by mouth 1 (One) Time Per Week. Prior to [facility] Admission, Patient was on:  takes on saturday       No current facility-administered medications for this visit.        Allergies:      Allergies   Allergen Reactions   • Toradol [Ketorolac Tromethamine] Anaphylaxis and Hives   • Haldol [Haloperidol] Hives and Mental Status Change   • Tramadol Hives and Swelling   • Amoxicillin Hives and Rash   • Penicillins Hives and Rash        Past Surgical History:     Past Surgical History:   Procedure Laterality Date   • ABDOMINAL SURGERY     • CARDIAC CATHETERIZATION     •  SECTION     • CHOLECYSTECTOMY     • COLONOSCOPY     • ENDOSCOPY     • EXTRACORPOREAL SHOCK WAVE LITHOTRIPSY (ESWL) Left 10/22/2021    Procedure: EXTRACORPOREAL SHOCKWAVE LITHOTRIPSY;  Surgeon: Milan Motley MD;  Location: [facility];  Service: Urology;  Laterality: Left;   • LITHOTRIPSY     • RIGHT OOPHORECTOMY     • URETEROSCOPY LASER LITHOTRIPSY WITH STENT INSERTION Left 10/01/2021    Procedure: URETEROSCOPY WITH STENT PLACEMENT;  Surgeon: Milan Motley MD;  Location: [facility];  Service: Urology;  Laterality: Left;   • URETEROSCOPY LASER LITHOTRIPSY WITH STENT INSERTION Left 2022  "   Procedure: URETEROSCOPY STENT REMOVAL;  Surgeon: Milan Motley MD;  Location: [facility];  Service: Urology;  Laterality: Left;   • URETEROSCOPY LASER LITHOTRIPSY WITH STENT INSERTION Left 6/29/2022    Procedure: CYSTOSCOPY RETROGRADE LEFT WITH STENT PLACEMENT;  Surgeon: Milan Motley MD;  Location: [facility];  Service: Urology;  Laterality: Left;         Social History:     Social History     Socioeconomic History   • Marital status:    Tobacco Use   • Smoking status: Never Smoker   • Smokeless tobacco: Never Used   Vaping Use   • Vaping Use: Never used   Substance and Sexual Activity   • Alcohol use: No   • Drug use: Never     Comment: Pt has track marks on right arm. Pt states \"It's from my INR being drawn.\"    • Sexual activity: Not Currently     Partners: Male     Birth control/protection: None       Family History:     Family History   Problem Relation Age of Onset   • Hypertension Mother    • Diabetes Father    • Hypertension Father    • Heart attack Father    • No Known Problems Sister    • No Known Problems Brother    • No Known Problems Son    • No Known Problems Daughter    • No Known Problems Maternal Grandmother    • No Known Problems Maternal Grandfather    • No Known Problems Paternal Grandmother    • No Known Problems Paternal Grandfather    • No Known Problems Cousin    • Rheum arthritis Neg Hx    • Osteoarthritis Neg Hx    • Asthma Neg Hx    • Heart failure Neg Hx    • Hyperlipidemia Neg Hx    • Migraines Neg Hx    • Rashes / Skin problems Neg Hx    • Seizures Neg Hx    • Stroke Neg Hx    • Thyroid disease Neg Hx        Review of Systems:     Review of Systems   Constitutional: Negative.  Negative for activity change, appetite change, chills, diaphoresis, fatigue and unexpected weight change.   HENT: Negative for congestion, dental problem, drooling, ear discharge, ear pain, facial swelling, hearing loss, mouth sores, nosebleeds, postnasal drip, rhinorrhea, sinus " pressure, sneezing, sore throat, tinnitus, trouble swallowing and voice change.    Eyes: Negative.  Negative for photophobia, pain, discharge, redness, itching and visual disturbance.   Respiratory: Negative.  Negative for apnea, cough, choking, chest tightness, shortness of breath, wheezing and stridor.    Cardiovascular: Negative.  Negative for chest pain, palpitations and leg swelling.   Gastrointestinal: Negative.  Negative for abdominal distention, abdominal pain, anal bleeding, blood in stool, constipation, diarrhea, nausea, rectal pain and vomiting.   Endocrine: Negative.  Negative for cold intolerance, heat intolerance, polydipsia, polyphagia and polyuria.   Genitourinary: Positive for flank pain.   Musculoskeletal: Negative for arthralgias, back pain, gait problem, joint swelling, myalgias, neck pain and neck stiffness.   Skin: Negative.  Negative for color change, pallor, rash and wound.   Allergic/Immunologic: Negative.  Negative for environmental allergies, food allergies and immunocompromised state.   Neurological: Negative.  Negative for dizziness, tremors, seizures, syncope, facial asymmetry, speech difficulty, weakness, light-headedness, numbness and headaches.   Hematological: Negative.  Negative for adenopathy. Does not bruise/bleed easily.   Psychiatric/Behavioral: Negative for agitation, behavioral problems, confusion, decreased concentration, dysphoric mood, hallucinations, self-injury, sleep disturbance and suicidal ideas. The patient is not nervous/anxious and is not hyperactive.    All other systems reviewed and are negative.      Physical Exam:     Physical Exam  Constitutional:       Appearance: She is well-developed.   HENT:      Head: Normocephalic and atraumatic.      Right Ear: External ear normal.      Left Ear: External ear normal.   Eyes:      Conjunctiva/sclera: Conjunctivae normal.      Pupils: Pupils are equal, round, and reactive to light.   Cardiovascular:      Rate and Rhythm:  Normal rate and regular rhythm.      Heart sounds: Normal heart sounds.   Pulmonary:      Effort: Pulmonary effort is normal.      Breath sounds: Normal breath sounds.   Abdominal:      General: Bowel sounds are normal. There is no distension.      Palpations: Abdomen is soft. There is no mass.      Tenderness: There is no abdominal tenderness. There is no guarding or rebound.   Genitourinary:     Vagina: No vaginal discharge.   Musculoskeletal:         General: Normal range of motion.   Skin:     General: Skin is warm and dry.   Neurological:      Mental Status: She is alert.      Deep Tendon Reflexes: Reflexes are normal and symmetric.   Psychiatric:         Behavior: Behavior normal.         Thought Content: Thought content normal.         Judgment: Judgment normal.         I have reviewed the following portions of the patient's history: Allergies, current medications, past family history, past medical history, past social history, past surgical history, problem list, and ROS and confirm it is accurate.      Recent Image (CT and/or KUB):      CT Abdomen and Pelvis: No results found for this or any previous visit.       CT Stone Protocol: Results for orders placed during the hospital encounter of 06/24/22    CT Abdomen Pelvis Stone Protocol    Narrative  EXAM:  CT Abdomen and Pelvis Without Intravenous Contrast    EXAM DATE:  6/24/2022 10:51 AM    CLINICAL HISTORY:  Flank pain, kidney stone suspected    TECHNIQUE:  Axial computed tomography images of the abdomen and pelvis without  intravenous contrast.  Sagittal and coronal reformatted images were  created and reviewed.  This CT exam was performed using one or more of  the following dose reduction techniques:  automated exposure control,  adjustment of the mA and/or kV according to patient size, and/or use of  iterative reconstruction technique.    COMPARISON:  06/10/2022    FINDINGS:  LUNG BASES:  Unremarkable.  No mass.  No consolidation.    ABDOMEN:  LIVER:   Marked enlargement of fatty liver.  GALLBLADDER AND BILE DUCTS:  Cholecystectomy clips.  No ductal  dilation.  PANCREAS:  Unremarkable.  No ductal dilation.  SPLEEN:  Unremarkable.  No splenomegaly.  ADRENALS:  Unremarkable.  No mass.  KIDNEYS AND URETERS:  Today there is mild left hydronephrosis with  surrounding stranding of the left kidney potentially representing upper  pole moiety UPJ calculus obstruction of about 6 mm.  STOMACH AND BOWEL:  Unremarkable.  No obstruction.  No mucosal  thickening.    PELVIS:  APPENDIX:  No findings to suggest acute appendicitis.  BLADDER:  Unremarkable.  No stones.  REPRODUCTIVE:  Unremarkable as visualized.    ABDOMEN and PELVIS:  INTRAPERITONEAL SPACE:  Unremarkable.  No free air.  No significant  fluid collection.  BONES/JOINTS:  No acute fracture.  No dislocation.  SOFT TISSUES:  Unremarkable.  VASCULATURE:  Unremarkable.  No abdominal aortic aneurysm.  LYMPH NODES:  Unremarkable.  No enlarged lymph nodes.    Impression  1.  Today there is mild left hydronephrosis with surrounding stranding  of the left kidney potentially representing upper pole moiety UPJ  calculus obstruction of about 6 mm.  2.  Marked enlargement of fatty liver.    This report was finalized on 6/24/2022 11:31 AM by Dr. Pj Lee MD.       KUB: Results for orders placed during the hospital encounter of 06/29/22    XR Abdomen KUB    Narrative  X-RAY ABDOMEN KUB    CLINICAL INDICATION: Stone.      COMPARISON: 06/21/2022    FINDINGS:  Two views of the abdomen.    Moderate volume of stool.    Calcific density is identified projecting over the left abdomen.      This report was finalized on 6/29/2022 3:39 PM by Dr. Ajay De Guzman MD.       Labs (past 3 months):      Admission on 06/29/2022, Discharged on 06/29/2022   Component Date Value Ref Range Status   • Extra Tube 06/29/2022 Hold for add-ons.   Final    Auto resulted.   • Extra Tube 06/29/2022 hold for add-on   Final    Auto resulted   • Extra Tube  06/29/2022 Hold for add-ons.   Final    Auto resulted.   • Extra Tube 06/29/2022 Hold for add-ons.   Final    Auto resulted   • Color, UA 06/29/2022 Yellow  Yellow, Straw Final   • Appearance, UA 06/29/2022 Cloudy (A) Clear Final   • pH, UA 06/29/2022 <=5.0  5.0 - 8.0 Final   • Specific Gravity, UA 06/29/2022 1.016  1.005 - 1.030 Final   • Glucose, UA 06/29/2022 100 mg/dL (Trace) (A) Negative Final   • Ketones, UA 06/29/2022 Trace (A) Negative Final   • Bilirubin, UA 06/29/2022 Negative  Negative Final   • Blood, UA 06/29/2022 Negative  Negative Final   • Protein, UA 06/29/2022 30 mg/dL (1+) (A) Negative Final   • Leuk Esterase, UA 06/29/2022 Moderate (2+) (A) Negative Final   • Nitrite, UA 06/29/2022 Negative  Negative Final   • Urobilinogen, UA 06/29/2022 0.2 E.U./dL  0.2 - 1.0 E.U./dL Final   • HCG, Urine QL 06/29/2022 Negative  Negative Final   • COVID19 06/29/2022 Not Detected  Not Detected - Ref. Range Final   • Influenza A PCR 06/29/2022 Not Detected  Not Detected Final   • Influenza B PCR 06/29/2022 Not Detected  Not Detected Final   • Glucose 06/29/2022 221 (A) 65 - 99 mg/dL Final   • BUN 06/29/2022 14  6 - 20 mg/dL Final   • Creatinine 06/29/2022 0.69  0.57 - 1.00 mg/dL Final   • Sodium 06/29/2022 135 (A) 136 - 145 mmol/L Final   • Potassium 06/29/2022 4.5  3.5 - 5.2 mmol/L Final   • Chloride 06/29/2022 99  98 - 107 mmol/L Final   • CO2 06/29/2022 18.7 (A) 22.0 - 29.0 mmol/L Final   • Calcium 06/29/2022 9.8  8.6 - 10.5 mg/dL Final   • Total Protein 06/29/2022 8.0  6.0 - 8.5 g/dL Final   • Albumin 06/29/2022 4.52  3.50 - 5.20 g/dL Final   • ALT (SGPT) 06/29/2022 39 (A) 1 - 33 U/L Final   • AST (SGOT) 06/29/2022 44 (A) 1 - 32 U/L Final   • Alkaline Phosphatase 06/29/2022 126 (A) 39 - 117 U/L Final   • Total Bilirubin 06/29/2022 0.4  0.0 - 1.2 mg/dL Final   • Globulin 06/29/2022 3.5  gm/dL Final   • A/G Ratio 06/29/2022 1.3  g/dL Final   • BUN/Creatinine Ratio 06/29/2022 20.3  7.0 - 25.0 Final   • Anion Gap  06/29/2022 17.3 (A) 5.0 - 15.0 mmol/L Final   • eGFR 06/29/2022 115.5  >60.0 mL/min/1.73 Final    National Kidney Foundation and American Society of Nephrology (ASN) Task Force recommended calculation based on the Chronic Kidney Disease Epidemiology Collaboration (CKD-EPI) equation refit without adjustment for race.   • Protime 06/29/2022 13.0  12.1 - 14.7 Seconds Final   • INR 06/29/2022 0.96  0.90 - 1.10 Final   • PTT 06/29/2022 27.8  26.5 - 34.5 seconds Final   • C-Reactive Protein 06/29/2022 1.68 (A) 0.00 - 0.50 mg/dL Final   • WBC 06/29/2022 4.93  3.40 - 10.80 10*3/mm3 Final   • RBC 06/29/2022 4.08  3.77 - 5.28 10*6/mm3 Final   • Hemoglobin 06/29/2022 13.2  12.0 - 15.9 g/dL Final   • Hematocrit 06/29/2022 38.0  34.0 - 46.6 % Final   • MCV 06/29/2022 93.1  79.0 - 97.0 fL Final   • MCH 06/29/2022 32.4  26.6 - 33.0 pg Final   • MCHC 06/29/2022 34.7  31.5 - 35.7 g/dL Final   • RDW 06/29/2022 13.0  12.3 - 15.4 % Final   • RDW-SD 06/29/2022 43.6  37.0 - 54.0 fl Final   • MPV 06/29/2022 8.6  6.0 - 12.0 fL Final   • Platelets 06/29/2022 214  140 - 450 10*3/mm3 Final   • Neutrophil % 06/29/2022 58.5  42.7 - 76.0 % Final   • Lymphocyte % 06/29/2022 30.2  19.6 - 45.3 % Final   • Monocyte % 06/29/2022 8.5  5.0 - 12.0 % Final   • Eosinophil % 06/29/2022 1.8  0.3 - 6.2 % Final   • Basophil % 06/29/2022 0.8  0.0 - 1.5 % Final   • Immature Grans % 06/29/2022 0.2  0.0 - 0.5 % Final   • Neutrophils, Absolute 06/29/2022 2.88  1.70 - 7.00 10*3/mm3 Final   • Lymphocytes, Absolute 06/29/2022 1.49  0.70 - 3.10 10*3/mm3 Final   • Monocytes, Absolute 06/29/2022 0.42  0.10 - 0.90 10*3/mm3 Final   • Eosinophils, Absolute 06/29/2022 0.09  0.00 - 0.40 10*3/mm3 Final   • Basophils, Absolute 06/29/2022 0.04  0.00 - 0.20 10*3/mm3 Final   • Immature Grans, Absolute 06/29/2022 0.01  0.00 - 0.05 10*3/mm3 Final   • nRBC 06/29/2022 0.0  0.0 - 0.2 /100 WBC Final   • RBC, UA 06/29/2022 None Seen  None Seen, 0-2 /HPF Final   • WBC, UA 06/29/2022  31-50 (A) None Seen, 0-2 /HPF Final   • Bacteria, UA 06/29/2022 Trace (A) None Seen /HPF Final   • Squamous Epithelial Cells, UA 06/29/2022 0-2  None Seen, 0-2 /HPF Final   • Yeast, UA 06/29/2022 Moderate/2+ Budding Yeast  None Seen /HPF Final   • Hyaline Casts, UA 06/29/2022 None Seen  None Seen /LPF Final   • Methodology 06/29/2022 Manual Light Microscopy   Final   • Glucose 06/29/2022 160 (A) 70 - 130 mg/dL Final    Meter: [id number] : [id number] [place] LYNCH   Admission on 06/24/2022, Discharged on 06/24/2022   Component Date Value Ref Range Status   • Glucose 06/24/2022 301 (A) 65 - 99 mg/dL Final   • BUN 06/24/2022 15  6 - 20 mg/dL Final   • Creatinine 06/24/2022 0.66  0.57 - 1.00 mg/dL Final   • Sodium 06/24/2022 136  136 - 145 mmol/L Final   • Potassium 06/24/2022 4.3  3.5 - 5.2 mmol/L Final    Slight hemolysis detected by analyzer. Results may be affected.   • Chloride 06/24/2022 98  98 - 107 mmol/L Final   • CO2 06/24/2022 19.4 (A) 22.0 - 29.0 mmol/L Final   • Calcium 06/24/2022 9.8  8.6 - 10.5 mg/dL Final   • Total Protein 06/24/2022 7.9  6.0 - 8.5 g/dL Final   • Albumin 06/24/2022 4.34  3.50 - 5.20 g/dL Final   • ALT (SGPT) 06/24/2022 36 (A) 1 - 33 U/L Final   • AST (SGOT) 06/24/2022 39 (A) 1 - 32 U/L Final   • Alkaline Phosphatase 06/24/2022 123 (A) 39 - 117 U/L Final   • Total Bilirubin 06/24/2022 0.3  0.0 - 1.2 mg/dL Final   • Globulin 06/24/2022 3.6  gm/dL Final   • A/G Ratio 06/24/2022 1.2  g/dL Final   • BUN/Creatinine Ratio 06/24/2022 22.7  7.0 - 25.0 Final   • Anion Gap 06/24/2022 18.6 (A) 5.0 - 15.0 mmol/L Final   • eGFR 06/24/2022 116.8  >60.0 mL/min/1.73 Final    National Kidney Foundation and American Society of Nephrology (ASN) Task Force recommended calculation based on the Chronic Kidney Disease Epidemiology Collaboration (CKD-EPI) equation refit without adjustment for race.   • Color, UA 06/24/2022 Yellow  Yellow, Straw Final   • Appearance, UA 06/24/2022 Clear  Clear Final   •  pH, UA 06/24/2022 <=5.0  5.0 - 8.0 Final   • Specific Gravity, UA 06/24/2022 1.021  1.005 - 1.030 Final   • Glucose, UA 06/24/2022 500 mg/dL (2+) (A) Negative Final   • Ketones, UA 06/24/2022 Trace (A) Negative Final   • Bilirubin, UA 06/24/2022 Negative  Negative Final   • Blood, UA 06/24/2022 Trace (A) Negative Final   • Protein, UA 06/24/2022 100 mg/dL (2+) (A) Negative Final   • Leuk Esterase, UA 06/24/2022 Small (1+) (A) Negative Final   • Nitrite, UA 06/24/2022 Negative  Negative Final   • Urobilinogen, UA 06/24/2022 0.2 E.U./dL  0.2 - 1.0 E.U./dL Final   • WBC 06/24/2022 4.50  3.40 - 10.80 10*3/mm3 Final   • RBC 06/24/2022 3.94  3.77 - 5.28 10*6/mm3 Final   • Hemoglobin 06/24/2022 12.6  12.0 - 15.9 g/dL Final   • Hematocrit 06/24/2022 36.2  34.0 - 46.6 % Final   • MCV 06/24/2022 91.9  79.0 - 97.0 fL Final   • MCH 06/24/2022 32.0  26.6 - 33.0 pg Final   • MCHC 06/24/2022 34.8  31.5 - 35.7 g/dL Final   • RDW 06/24/2022 12.8  12.3 - 15.4 % Final   • RDW-SD 06/24/2022 41.9  37.0 - 54.0 fl Final   • MPV 06/24/2022 8.7  6.0 - 12.0 fL Final   • Platelets 06/24/2022 241  140 - 450 10*3/mm3 Final   • Neutrophil % 06/24/2022 54.7  42.7 - 76.0 % Final   • Lymphocyte % 06/24/2022 33.3  19.6 - 45.3 % Final   • Monocyte % 06/24/2022 8.2  5.0 - 12.0 % Final   • Eosinophil % 06/24/2022 2.2  0.3 - 6.2 % Final   • Basophil % 06/24/2022 0.7  0.0 - 1.5 % Final   • Immature Grans % 06/24/2022 0.9 (A) 0.0 - 0.5 % Final   • Neutrophils, Absolute 06/24/2022 2.46  1.70 - 7.00 10*3/mm3 Final   • Lymphocytes, Absolute 06/24/2022 1.50  0.70 - 3.10 10*3/mm3 Final   • Monocytes, Absolute 06/24/2022 0.37  0.10 - 0.90 10*3/mm3 Final   • Eosinophils, Absolute 06/24/2022 0.10  0.00 - 0.40 10*3/mm3 Final   • Basophils, Absolute 06/24/2022 0.03  0.00 - 0.20 10*3/mm3 Final   • Immature Grans, Absolute 06/24/2022 0.04  0.00 - 0.05 10*3/mm3 Final   • nRBC 06/24/2022 0.0  0.0 - 0.2 /100 WBC Final   • Extra Tube 06/24/2022 Hold for add-ons.   Final     Auto resulted.   • Extra Tube 06/24/2022 hold for add-on   Final    Auto resulted   • Extra Tube 06/24/2022 Hold for add-ons.   Final    Auto resulted.   • Extra Tube 06/24/2022 Hold for add-ons.   Final    Auto resulted   • RBC, UA 06/24/2022 3-5 (A) None Seen, 0-2 /HPF Final   • WBC, UA 06/24/2022 Too Numerous to Count (A) None Seen, 0-2 /HPF Final   • Bacteria, UA 06/24/2022 None Seen  None Seen /HPF Final   • Squamous Epithelial Cells, UA 06/24/2022 7-12 (A) None Seen, 0-2 /HPF Final   • Yeast, UA 06/24/2022 Large/3+ Budding Yeast  None Seen /HPF Final   • Hyaline Casts, UA 06/24/2022 None Seen  None Seen /LPF Final   • Methodology 06/24/2022 Manual Light Microscopy   Final   • Urine Culture 06/24/2022 50,000 CFU/mL Mixed Margo Isolated   Final   Admission on 06/21/2022, Discharged on 06/21/2022   Component Date Value Ref Range Status   • Glucose 06/21/2022 308 (A) 65 - 99 mg/dL Final   • BUN 06/21/2022 16  6 - 20 mg/dL Final   • Creatinine 06/21/2022 0.60  0.57 - 1.00 mg/dL Final   • Sodium 06/21/2022 132 (A) 136 - 145 mmol/L Final   • Potassium 06/21/2022 4.2  3.5 - 5.2 mmol/L Final    Slight hemolysis detected by analyzer. Results may be affected.   • Chloride 06/21/2022 95 (A) 98 - 107 mmol/L Final   • CO2 06/21/2022 19.3 (A) 22.0 - 29.0 mmol/L Final   • Calcium 06/21/2022 9.6  8.6 - 10.5 mg/dL Final   • Total Protein 06/21/2022 7.6  6.0 - 8.5 g/dL Final   • Albumin 06/21/2022 4.12  3.50 - 5.20 g/dL Final   • ALT (SGPT) 06/21/2022 30  1 - 33 U/L Final   • AST (SGOT) 06/21/2022 41 (A) 1 - 32 U/L Final   • Alkaline Phosphatase 06/21/2022 116  39 - 117 U/L Final   • Total Bilirubin 06/21/2022 0.4  0.0 - 1.2 mg/dL Final   • Globulin 06/21/2022 3.5  gm/dL Final   • A/G Ratio 06/21/2022 1.2  g/dL Final   • BUN/Creatinine Ratio 06/21/2022 26.7 (A) 7.0 - 25.0 Final   • Anion Gap 06/21/2022 17.7 (A) 5.0 - 15.0 mmol/L Final   • eGFR 06/21/2022 119.5  >60.0 mL/min/1.73 Final    National Kidney Foundation and  American Society of Nephrology (ASN) Task Force recommended calculation based on the Chronic Kidney Disease Epidemiology Collaboration (CKD-EPI) equation refit without adjustment for race.   • Lipase 06/21/2022 47  13 - 60 U/L Final   • HCG, Urine QL 06/21/2022 Negative  Negative Final   • Color, UA 06/21/2022 Yellow  Yellow, Straw Final   • Appearance, UA 06/21/2022 Clear  Clear Final   • pH, UA 06/21/2022 5.5  5.0 - 8.0 Final   • Specific Gravity, UA 06/21/2022 1.018  1.005 - 1.030 Final   • Glucose, UA 06/21/2022 500 mg/dL (2+) (A) Negative Final   • Ketones, UA 06/21/2022 Negative  Negative Final   • Bilirubin, UA 06/21/2022 Negative  Negative Final   • Blood, UA 06/21/2022 Trace (A) Negative Final   • Protein, UA 06/21/2022 100 mg/dL (2+) (A) Negative Final   • Leuk Esterase, UA 06/21/2022 Moderate (2+) (A) Negative Final   • Nitrite, UA 06/21/2022 Negative  Negative Final   • Urobilinogen, UA 06/21/2022 0.2 E.U./dL  0.2 - 1.0 E.U./dL Final   • WBC 06/21/2022 4.71  3.40 - 10.80 10*3/mm3 Final   • RBC 06/21/2022 3.71 (A) 3.77 - 5.28 10*6/mm3 Final   • Hemoglobin 06/21/2022 12.1  12.0 - 15.9 g/dL Final   • Hematocrit 06/21/2022 34.0  34.0 - 46.6 % Final   • MCV 06/21/2022 91.6  79.0 - 97.0 fL Final   • MCH 06/21/2022 32.6  26.6 - 33.0 pg Final   • MCHC 06/21/2022 35.6  31.5 - 35.7 g/dL Final   • RDW 06/21/2022 12.7  12.3 - 15.4 % Final   • RDW-SD 06/21/2022 41.5  37.0 - 54.0 fl Final   • MPV 06/21/2022 8.6  6.0 - 12.0 fL Final   • Platelets 06/21/2022 231  140 - 450 10*3/mm3 Final   • Neutrophil % 06/21/2022 57.9  42.7 - 76.0 % Final   • Lymphocyte % 06/21/2022 29.9  19.6 - 45.3 % Final   • Monocyte % 06/21/2022 8.3  5.0 - 12.0 % Final   • Eosinophil % 06/21/2022 2.5  0.3 - 6.2 % Final   • Basophil % 06/21/2022 0.6  0.0 - 1.5 % Final   • Immature Grans % 06/21/2022 0.8 (A) 0.0 - 0.5 % Final   • Neutrophils, Absolute 06/21/2022 2.72  1.70 - 7.00 10*3/mm3 Final   • Lymphocytes, Absolute 06/21/2022 1.41  0.70 - 3.10  10*3/mm3 Final   • Monocytes, Absolute 06/21/2022 0.39  0.10 - 0.90 10*3/mm3 Final   • Eosinophils, Absolute 06/21/2022 0.12  0.00 - 0.40 10*3/mm3 Final   • Basophils, Absolute 06/21/2022 0.03  0.00 - 0.20 10*3/mm3 Final   • Immature Grans, Absolute 06/21/2022 0.04  0.00 - 0.05 10*3/mm3 Final   • nRBC 06/21/2022 0.0  0.0 - 0.2 /100 WBC Final   • RBC, UA 06/21/2022 0-2  None Seen, 0-2 /HPF Final   • WBC, UA 06/21/2022 31-50 (A) None Seen, 0-2 /HPF Final   • Bacteria, UA 06/21/2022 None Seen  None Seen /HPF Final   • Squamous Epithelial Cells, UA 06/21/2022 0-2  None Seen, 0-2 /HPF Final   • Hyaline Casts, UA 06/21/2022 None Seen  None Seen /LPF Final   • Methodology 06/21/2022 Manual Light Microscopy   Final   Admission on 06/15/2022, Discharged on 06/15/2022   Component Date Value Ref Range Status   • Glucose 06/15/2022 308 (A) 65 - 99 mg/dL Final   • BUN 06/15/2022 20  6 - 20 mg/dL Final   • Creatinine 06/15/2022 0.78  0.57 - 1.00 mg/dL Final   • Sodium 06/15/2022 135 (A) 136 - 145 mmol/L Final   • Potassium 06/15/2022 4.7  3.5 - 5.2 mmol/L Final    Slight hemolysis detected by analyzer. Results may be affected.   • Chloride 06/15/2022 97 (A) 98 - 107 mmol/L Final   • CO2 06/15/2022 19.8 (A) 22.0 - 29.0 mmol/L Final   • Calcium 06/15/2022 10.2  8.6 - 10.5 mg/dL Final   • Total Protein 06/15/2022 8.1  6.0 - 8.5 g/dL Final   • Albumin 06/15/2022 4.27  3.50 - 5.20 g/dL Final   • ALT (SGPT) 06/15/2022 29  1 - 33 U/L Final   • AST (SGOT) 06/15/2022 34 (A) 1 - 32 U/L Final   • Alkaline Phosphatase 06/15/2022 129 (A) 39 - 117 U/L Final   • Total Bilirubin 06/15/2022 0.4  0.0 - 1.2 mg/dL Final   • Globulin 06/15/2022 3.8  gm/dL Final   • A/G Ratio 06/15/2022 1.1  g/dL Final   • BUN/Creatinine Ratio 06/15/2022 25.6 (A) 7.0 - 25.0 Final   • Anion Gap 06/15/2022 18.2 (A) 5.0 - 15.0 mmol/L Final   • eGFR 06/15/2022 101.1  >60.0 mL/min/1.73 Final    National Kidney Foundation and American Society of Nephrology (ASN) Task Force  recommended calculation based on the Chronic Kidney Disease Epidemiology Collaboration (CKD-EPI) equation refit without adjustment for race.   • HCG, Urine QL 06/15/2022 Negative  Negative Final   • Color, UA 06/15/2022 Yellow  Yellow, Straw Final   • Appearance, UA 06/15/2022 Turbid (A) Clear Final   • pH, UA 06/15/2022 5.5  5.0 - 8.0 Final   • Specific Gravity, UA 06/15/2022 1.018  1.005 - 1.030 Final   • Glucose, UA 06/15/2022 250 mg/dL (1+) (A) Negative Final   • Ketones, UA 06/15/2022 Negative  Negative Final   • Bilirubin, UA 06/15/2022 Negative  Negative Final   • Blood, UA 06/15/2022 Moderate (2+) (A) Negative Final   • Protein, UA 06/15/2022 >=300 mg/dL (3+) (A) Negative Final   • Leuk Esterase, UA 06/15/2022 Large (3+) (A) Negative Final   • Nitrite, UA 06/15/2022 Positive (A) Negative Final   • Urobilinogen, UA 06/15/2022 0.2 E.U./dL  0.2 - 1.0 E.U./dL Final   • THC, Screen, Urine 06/15/2022 Negative  Negative Final   • Phencyclidine (PCP), Urine 06/15/2022 Negative  Negative Final   • Cocaine Screen, Urine 06/15/2022 Negative  Negative Final   • Methamphetamine, Ur 06/15/2022 Negative  Negative Final   • Opiate Screen 06/15/2022 Negative  Negative Final   • Amphetamine Screen, Urine 06/15/2022 Negative  Negative Final   • Benzodiazepine Screen, Urine 06/15/2022 Negative  Negative Final   • Tricyclic Antidepressants Screen 06/15/2022 Negative  Negative Final   • Methadone Screen, Urine 06/15/2022 Negative  Negative Final   • Barbiturates Screen, Urine 06/15/2022 Negative  Negative Final   • Oxycodone Screen, Urine 06/15/2022 Negative  Negative Final   • Propoxyphene Screen 06/15/2022 Negative  Negative Final   • Buprenorphine, Screen, Urine 06/15/2022 Negative  Negative Final   • C-Reactive Protein 06/15/2022 2.87 (A) 0.00 - 0.50 mg/dL Final   • Lactate 06/15/2022 3.7 (A) 0.5 - 2.0 mmol/L Final   • Blood Culture 06/15/2022 No growth at 5 days   Final   • Blood Culture 06/15/2022 No growth at 5 days    Final   • WBC 06/15/2022 5.86  3.40 - 10.80 10*3/mm3 Final   • RBC 06/15/2022 3.85  3.77 - 5.28 10*6/mm3 Final   • Hemoglobin 06/15/2022 12.4  12.0 - 15.9 g/dL Final   • Hematocrit 06/15/2022 35.0  34.0 - 46.6 % Final   • MCV 06/15/2022 90.9  79.0 - 97.0 fL Final   • MCH 06/15/2022 32.2  26.6 - 33.0 pg Final   • MCHC 06/15/2022 35.4  31.5 - 35.7 g/dL Final   • RDW 06/15/2022 12.7  12.3 - 15.4 % Final   • RDW-SD 06/15/2022 40.9  37.0 - 54.0 fl Final   • MPV 06/15/2022 8.6  6.0 - 12.0 fL Final   • Platelets 06/15/2022 237  140 - 450 10*3/mm3 Final   • Neutrophil % 06/15/2022 58.7  42.7 - 76.0 % Final   • Lymphocyte % 06/15/2022 28.0  19.6 - 45.3 % Final   • Monocyte % 06/15/2022 9.7  5.0 - 12.0 % Final   • Eosinophil % 06/15/2022 2.4  0.3 - 6.2 % Final   • Basophil % 06/15/2022 0.9  0.0 - 1.5 % Final   • Immature Grans % 06/15/2022 0.3  0.0 - 0.5 % Final   • Neutrophils, Absolute 06/15/2022 3.44  1.70 - 7.00 10*3/mm3 Final   • Lymphocytes, Absolute 06/15/2022 1.64  0.70 - 3.10 10*3/mm3 Final   • Monocytes, Absolute 06/15/2022 0.57  0.10 - 0.90 10*3/mm3 Final   • Eosinophils, Absolute 06/15/2022 0.14  0.00 - 0.40 10*3/mm3 Final   • Basophils, Absolute 06/15/2022 0.05  0.00 - 0.20 10*3/mm3 Final   • Immature Grans, Absolute 06/15/2022 0.02  0.00 - 0.05 10*3/mm3 Final   • nRBC 06/15/2022 0.0  0.0 - 0.2 /100 WBC Final   • Lactate 06/15/2022 3.5 (A) 0.5 - 2.0 mmol/L Final   • RBC, UA 06/15/2022 Too Numerous to Count (A) None Seen, 0-2 /HPF Final   • WBC, UA 06/15/2022 Too Numerous to Count (A) None Seen, 0-2 /HPF Final   • Bacteria, UA 06/15/2022 4+ (A) None Seen /HPF Final   • Squamous Epithelial Cells, UA 06/15/2022 7-12 (A) None Seen, 0-2 /HPF Final   • Yeast, UA 06/15/2022 Small/1+ Budding Yeast  None Seen /HPF Final   • Hyaline Casts, UA 06/15/2022 3-6  None Seen /LPF Final   • Methodology 06/15/2022 Automated Microscopy   Final   • Urine Culture 06/15/2022 >100,000 CFU/mL Escherichia coli (A)  Final   Admission on  06/10/2022, Discharged on 06/10/2022   Component Date Value Ref Range Status   • Color, UA 06/10/2022 Yellow  Yellow, Straw Final   • Appearance, UA 06/10/2022 Clear  Clear Final   • pH, UA 06/10/2022 6.0  5.0 - 8.0 Final   • Specific Gravity, UA 06/10/2022 1.022  1.005 - 1.030 Final   • Glucose, UA 06/10/2022 >=1000 mg/dL (3+) (A) Negative Final   • Ketones, UA 06/10/2022 Negative  Negative Final   • Bilirubin, UA 06/10/2022 Negative  Negative Final   • Blood, UA 06/10/2022 Negative  Negative Final   • Protein, UA 06/10/2022 100 mg/dL (2+) (A) Negative Final   • Leuk Esterase, UA 06/10/2022 Small (1+) (A) Negative Final   • Nitrite, UA 06/10/2022 Negative  Negative Final   • Urobilinogen, UA 06/10/2022 0.2 E.U./dL  0.2 - 1.0 E.U./dL Final   • WBC 06/10/2022 5.43  3.40 - 10.80 10*3/mm3 Final   • RBC 06/10/2022 3.82  3.77 - 5.28 10*6/mm3 Final   • Hemoglobin 06/10/2022 12.2  12.0 - 15.9 g/dL Final   • Hematocrit 06/10/2022 34.8  34.0 - 46.6 % Final   • MCV 06/10/2022 91.1  79.0 - 97.0 fL Final   • MCH 06/10/2022 31.9  26.6 - 33.0 pg Final   • MCHC 06/10/2022 35.1  31.5 - 35.7 g/dL Final   • RDW 06/10/2022 12.3  12.3 - 15.4 % Final   • RDW-SD 06/10/2022 39.8  37.0 - 54.0 fl Final   • MPV 06/10/2022 8.9  6.0 - 12.0 fL Final   • Platelets 06/10/2022 226  140 - 450 10*3/mm3 Final   • Neutrophil % 06/10/2022 67.2  42.7 - 76.0 % Final   • Lymphocyte % 06/10/2022 23.0  19.6 - 45.3 % Final   • Monocyte % 06/10/2022 6.4  5.0 - 12.0 % Final   • Eosinophil % 06/10/2022 2.2  0.3 - 6.2 % Final   • Basophil % 06/10/2022 0.6  0.0 - 1.5 % Final   • Immature Grans % 06/10/2022 0.6 (A) 0.0 - 0.5 % Final   • Neutrophils, Absolute 06/10/2022 3.65  1.70 - 7.00 10*3/mm3 Final   • Lymphocytes, Absolute 06/10/2022 1.25  0.70 - 3.10 10*3/mm3 Final   • Monocytes, Absolute 06/10/2022 0.35  0.10 - 0.90 10*3/mm3 Final   • Eosinophils, Absolute 06/10/2022 0.12  0.00 - 0.40 10*3/mm3 Final   • Basophils, Absolute 06/10/2022 0.03  0.00 - 0.20  10*3/mm3 Final   • Immature Grans, Absolute 06/10/2022 0.03  0.00 - 0.05 10*3/mm3 Final   • nRBC 06/10/2022 0.0  0.0 - 0.2 /100 WBC Final   • Glucose 06/10/2022 368 (A) 65 - 99 mg/dL Final   • BUN 06/10/2022 23 (A) 6 - 20 mg/dL Final   • Creatinine 06/10/2022 0.83  0.57 - 1.00 mg/dL Final   • Sodium 06/10/2022 132 (A) 136 - 145 mmol/L Final   • Potassium 06/10/2022 4.8  3.5 - 5.2 mmol/L Final    Slight hemolysis detected by analyzer. Results may be affected.   • Chloride 06/10/2022 95 (A) 98 - 107 mmol/L Final   • CO2 06/10/2022 18.1 (A) 22.0 - 29.0 mmol/L Final   • Calcium 06/10/2022 9.9  8.6 - 10.5 mg/dL Final   • Total Protein 06/10/2022 8.0  6.0 - 8.5 g/dL Final   • Albumin 06/10/2022 4.28  3.50 - 5.20 g/dL Final   • ALT (SGPT) 06/10/2022 29  1 - 33 U/L Final   • AST (SGOT) 06/10/2022 32  1 - 32 U/L Final   • Alkaline Phosphatase 06/10/2022 132 (A) 39 - 117 U/L Final   • Total Bilirubin 06/10/2022 0.4  0.0 - 1.2 mg/dL Final   • Globulin 06/10/2022 3.7  gm/dL Final   • A/G Ratio 06/10/2022 1.2  g/dL Final   • BUN/Creatinine Ratio 06/10/2022 27.7 (A) 7.0 - 25.0 Final   • Anion Gap 06/10/2022 18.9 (A) 5.0 - 15.0 mmol/L Final   • eGFR 06/10/2022 93.8  >60.0 mL/min/1.73 Final    National Kidney Foundation and American Society of Nephrology (ASN) Task Force recommended calculation based on the Chronic Kidney Disease Epidemiology Collaboration (CKD-EPI) equation refit without adjustment for race.   • RBC, UA 06/10/2022 0-2  None Seen, 0-2 /HPF Final   • WBC, UA 06/10/2022 Too Numerous to Count (A) None Seen, 0-2 /HPF Final   • Bacteria, UA 06/10/2022 1+ (A) None Seen /HPF Final   • Squamous Epithelial Cells, UA 06/10/2022 0-2  None Seen, 0-2 /HPF Final   • Hyaline Casts, UA 06/10/2022 None Seen  None Seen /LPF Final   • Methodology 06/10/2022 Automated Microscopy   Final   • Urine Culture 06/10/2022 >100,000 CFU/mL Escherichia coli (A)  Final   • HCG, Urine QL 06/10/2022 Negative  Negative Final   Office Visit on  06/09/2022   Component Date Value Ref Range Status   • Homocysteine, Plasma (Quant) 06/09/2022 11.4  0.0 - 15.0 umol/L Final   Admission on 06/02/2022, Discharged on 06/02/2022   Component Date Value Ref Range Status   • Glucose 06/02/2022 204 (A) 65 - 99 mg/dL Final   • BUN 06/02/2022 22 (A) 6 - 20 mg/dL Final   • Creatinine 06/02/2022 0.71  0.57 - 1.00 mg/dL Final   • Sodium 06/02/2022 133 (A) 136 - 145 mmol/L Final   • Potassium 06/02/2022 4.2  3.5 - 5.2 mmol/L Final   • Chloride 06/02/2022 97 (A) 98 - 107 mmol/L Final   • CO2 06/02/2022 20.9 (A) 22.0 - 29.0 mmol/L Final   • Calcium 06/02/2022 9.8  8.6 - 10.5 mg/dL Final   • Total Protein 06/02/2022 8.3  6.0 - 8.5 g/dL Final   • Albumin 06/02/2022 4.70  3.50 - 5.20 g/dL Final   • ALT (SGPT) 06/02/2022 21  1 - 33 U/L Final   • AST (SGOT) 06/02/2022 27  1 - 32 U/L Final   • Alkaline Phosphatase 06/02/2022 118 (A) 39 - 117 U/L Final   • Total Bilirubin 06/02/2022 0.5  0.0 - 1.2 mg/dL Final   • Globulin 06/02/2022 3.6  gm/dL Final   • A/G Ratio 06/02/2022 1.3  g/dL Final   • BUN/Creatinine Ratio 06/02/2022 31.0 (A) 7.0 - 25.0 Final   • Anion Gap 06/02/2022 15.1 (A) 5.0 - 15.0 mmol/L Final   • eGFR 06/02/2022 113.2  >60.0 mL/min/1.73 Final    National Kidney Foundation and American Society of Nephrology (ASN) Task Force recommended calculation based on the Chronic Kidney Disease Epidemiology Collaboration (CKD-EPI) equation refit without adjustment for race.   • Color, UA 06/02/2022 Yellow  Yellow, Straw Final   • Appearance, UA 06/02/2022 Clear  Clear Final   • pH, UA 06/02/2022 5.5  5.0 - 8.0 Final   • Specific Gravity, UA 06/02/2022 1.019  1.005 - 1.030 Final   • Glucose, UA 06/02/2022 100 mg/dL (Trace) (A) Negative Final   • Ketones, UA 06/02/2022 Negative  Negative Final   • Bilirubin, UA 06/02/2022 Negative  Negative Final   • Blood, UA 06/02/2022 Large (3+) (A) Negative Final   • Protein, UA 06/02/2022 30 mg/dL (1+) (A) Negative Final   • Leuk Esterase, UA  06/02/2022 Moderate (2+) (A) Negative Final   • Nitrite, UA 06/02/2022 Negative  Negative Final   • Urobilinogen, UA 06/02/2022 0.2 E.U./dL  0.2 - 1.0 E.U./dL Final   • C-Reactive Protein 06/02/2022 1.53 (A) 0.00 - 0.50 mg/dL Final   • WBC 06/02/2022 5.52  3.40 - 10.80 10*3/mm3 Final   • RBC 06/02/2022 3.91  3.77 - 5.28 10*6/mm3 Final   • Hemoglobin 06/02/2022 12.7  12.0 - 15.9 g/dL Final   • Hematocrit 06/02/2022 35.6  34.0 - 46.6 % Final   • MCV 06/02/2022 91.0  79.0 - 97.0 fL Final   • MCH 06/02/2022 32.5  26.6 - 33.0 pg Final   • MCHC 06/02/2022 35.7  31.5 - 35.7 g/dL Final   • RDW 06/02/2022 12.5  12.3 - 15.4 % Final   • RDW-SD 06/02/2022 41.0  37.0 - 54.0 fl Final   • MPV 06/02/2022 8.3  6.0 - 12.0 fL Final   • Platelets 06/02/2022 241  140 - 450 10*3/mm3 Final   • Scan Slide 06/02/2022    Final    See Manual Differential Results   • RBC, UA 06/02/2022 21-30 (A) None Seen, 0-2 /HPF Final   • WBC, UA 06/02/2022 21-30 (A) None Seen, 0-2 /HPF Final    Some in clumps   • Bacteria, UA 06/02/2022 None Seen  None Seen /HPF Final   • Squamous Epithelial Cells, UA 06/02/2022 3-6 (A) None Seen, 0-2 /HPF Final   • Yeast, UA 06/02/2022 Small/1+ Budding Yeast  None Seen /HPF Final   • Hyaline Casts, UA 06/02/2022 None Seen  None Seen /LPF Final   • Methodology 06/02/2022 Manual Light Microscopy   Final   • Neutrophil % 06/02/2022 44.0  42.7 - 76.0 % Final   • Lymphocyte % 06/02/2022 42.0  19.6 - 45.3 % Final   • Monocyte % 06/02/2022 11.0  5.0 - 12.0 % Final   • Eosinophil % 06/02/2022 3.0  0.3 - 6.2 % Final   • Neutrophils Absolute 06/02/2022 2.43  1.70 - 7.00 10*3/mm3 Final   • Lymphocytes Absolute 06/02/2022 2.32  0.70 - 3.10 10*3/mm3 Final   • Monocytes Absolute 06/02/2022 0.61  0.10 - 0.90 10*3/mm3 Final   • Eosinophils Absolute 06/02/2022 0.17  0.00 - 0.40 10*3/mm3 Final   • RBC Morphology 06/02/2022 Normal  Normal Final   • Platelet Morphology 06/02/2022 Normal  Normal Final   • Urine Culture 06/02/2022 50,000  CFU/mL Normal Urogenital Margo   Final   Office Visit on 05/26/2022   Component Date Value Ref Range Status   • AntiThromb III Func 05/26/2022 142 (A) 75 - 135 % Final    An elevated antithrombin activity is of no known clinical  significance. Direct Xa inhibitor anticoagulants such as rivaroxaban,  apixaban and edoxaban will lead to spuriously elevated antithrombin  activity levels possibly masking a deficiency.   • Antithrombin Antigen 05/26/2022 110  72 - 124 % Final    This test was developed and its performance characteristics  determined by [facility]. It has not been cleared or approved  by the Food and Drug Administration.   • Protein C-Functional 05/26/2022 196 (A) 73 - 180 % Final    Elevated protein C activity is of no known clinical significance.   • Protein S-Functional 05/26/2022 86  63 - 140 % Final    Protein S activity may be falsely increased (masking an abnormal, low  result) in patients receiving direct Xa inhibitor (e.g., rivaroxaban,  apixaban, edoxaban) or a direct thrombin inhibitor (e.g., dabigatran)  anticoagulant treatment due to assay interference by these drugs.   • PTT Lupus Anticoagulant 05/26/2022 33.0  0.0 - 51.9 sec Final   • Dilute Viper Venom Time 05/26/2022 37.0  0.0 - 47.0 sec Final   • Lupus Anticoagulant Reflex 05/26/2022 Comment:   Final    No lupus anticoagulant was detected.   • Beta-2 Glyco 1 IgG 05/26/2022 <9  0 - 20 GPI IgG units Final    The reference interval reflects a 3SD or 99th percentile interval,  which is thought to represent a potentially clinically significant  result in accordance with the International Consensus Statement on  the classification criteria for definitive antiphospholipid syndrome  (APS). J Thromb Haem 2006;4:295-306.   • Beta-2 Glyco 1 IgA 05/26/2022 <9  0 - 25 GPI IgA units Final    The reference interval reflects a 3SD or 99th percentile interval,  which is thought to represent a potentially clinically significant  result in accordance with the  International Consensus Statement on  the classification criteria for definitive antiphospholipid syndrome  (APS). J Thromb Haem 2006;4:295-306.   • Beta-2 Glyco 1 IgM 05/26/2022 <9  0 - 32 GPI IgM units Final    The reference interval reflects a 3SD or 99th percentile interval,  which is thought to represent a potentially clinically significant  result in accordance with the International Consensus Statement on  the classification criteria for definitive antiphospholipid syndrome  (APS). J Thromb Haem 2006;4:295-306.   • Anticardiolipin IgG 05/26/2022 <9  0 - 14 GPL U/mL Final                              Negative:              <15                            Indeterminate:     15 - 20                            Low-Med Positive: >20 - 80                            High Positive:         >80   • Anticardiolipin IgM 05/26/2022 <9  0 - 12 MPL U/mL Final                              Negative:              <13                            Indeterminate:     13 - 20                            Low-Med Positive: >20 - 80                            High Positive:         >80   • WBC 05/26/2022 4.65  3.40 - 10.80 10*3/mm3 Final   • RBC 05/26/2022 3.63 (A) 3.77 - 5.28 10*6/mm3 Final   • Hemoglobin 05/26/2022 11.7 (A) 12.0 - 15.9 g/dL Final   • Hematocrit 05/26/2022 32.9 (A) 34.0 - 46.6 % Final   • MCV 05/26/2022 90.6  79.0 - 97.0 fL Final   • MCH 05/26/2022 32.2  26.6 - 33.0 pg Final   • MCHC 05/26/2022 35.6  31.5 - 35.7 g/dL Final   • RDW 05/26/2022 13.0  12.3 - 15.4 % Final   • RDW-SD 05/26/2022 42.4  37.0 - 54.0 fl Final   • MPV 05/26/2022 8.4  6.0 - 12.0 fL Final   • Platelets 05/26/2022 229  140 - 450 10*3/mm3 Final   • Neutrophil % 05/26/2022 50.0  42.7 - 76.0 % Final   • Lymphocyte % 05/26/2022 37.0  19.6 - 45.3 % Final    Atypical/Reactive Lymphocytes noted.     • Monocyte % 05/26/2022 9.0  5.0 - 12.0 % Final   • Eosinophil % 05/26/2022 3.0  0.3 - 6.2 % Final   • Bands %  05/26/2022 1.0  0.0 - 5.0 % Final   •  Neutrophils Absolute 05/26/2022 2.37  1.70 - 7.00 10*3/mm3 Final   • Lymphocytes Absolute 05/26/2022 1.72  0.70 - 3.10 10*3/mm3 Final   • Monocytes Absolute 05/26/2022 0.42  0.10 - 0.90 10*3/mm3 Final   • Eosinophils Absolute 05/26/2022 0.14  0.00 - 0.40 10*3/mm3 Final   • RBC Morphology 05/26/2022 Normal  Normal Final   • Platelet Morphology 05/26/2022 Normal  Normal Final   Office Visit on 05/24/2022   Component Date Value Ref Range Status   • Color 05/24/2022 Yellow  Yellow, Straw, Dark Yellow, Latisha Final   • Clarity, UA 05/24/2022 Slightly Cloudy (A) Clear Final   • Specific Gravity  05/24/2022 1.020  1.005 - 1.030 Final   • pH, Urine 05/24/2022 6.0  5.0 - 8.0 Final   • Leukocytes 05/24/2022 Large (3+) (A) Negative Final   • Nitrite, UA 05/24/2022 Negative  Negative Final   • Protein, POC 05/24/2022 Trace (A) Negative mg/dL Final   • Glucose, UA 05/24/2022 Negative  Negative, 1000 mg/dL (3+) mg/dL Final   • Ketones, UA 05/24/2022 Negative  Negative Final   • Urobilinogen, UA 05/24/2022 Normal  Normal Final   • Bilirubin 05/24/2022 Negative  Negative Final   • Blood, UA 05/24/2022 3+ (A) Negative Final   • Lot Number 05/24/2022 [id number]   Final   • Expiration Date 05/24/2022 11-24-23   Final   • Urine Culture 05/24/2022 25,000 CFU/mL Mixed Margo Isolated   Final   Admission on 05/12/2022, Discharged on 05/12/2022   Component Date Value Ref Range Status   • Glucose 05/12/2022 201 (A) 65 - 99 mg/dL Final   • BUN 05/12/2022 23 (A) 6 - 20 mg/dL Final   • Creatinine 05/12/2022 0.75  0.57 - 1.00 mg/dL Final   • Sodium 05/12/2022 136  136 - 145 mmol/L Final   • Potassium 05/12/2022 4.7  3.5 - 5.2 mmol/L Final    Slight hemolysis detected by analyzer. Results may be affected.   • Chloride 05/12/2022 100  98 - 107 mmol/L Final   • CO2 05/12/2022 21.8 (A) 22.0 - 29.0 mmol/L Final   • Calcium 05/12/2022 9.9  8.6 - 10.5 mg/dL Final   • Total Protein 05/12/2022 8.0  6.0 - 8.5 g/dL Final   • Albumin 05/12/2022 4.34   3.50 - 5.20 g/dL Final   • ALT (SGPT) 05/12/2022 21  1 - 33 U/L Final   • AST (SGOT) 05/12/2022 19  1 - 32 U/L Final   • Alkaline Phosphatase 05/12/2022 120 (A) 39 - 117 U/L Final   • Total Bilirubin 05/12/2022 0.4  0.0 - 1.2 mg/dL Final   • Globulin 05/12/2022 3.7  gm/dL Final   • A/G Ratio 05/12/2022 1.2  g/dL Final   • BUN/Creatinine Ratio 05/12/2022 30.7 (A) 7.0 - 25.0 Final   • Anion Gap 05/12/2022 14.2  5.0 - 15.0 mmol/L Final   • eGFR 05/12/2022 106.0  >60.0 mL/min/1.73 Final    [employer] and American Society of Nephrology (ASN) Task Force recommended calculation based on the Chronic Kidney Disease Epidemiology Collaboration (CKD-EPI) equation refit without adjustment for race.   • Lipase 05/12/2022 29  13 - 60 U/L Final   • Lactate 05/12/2022 3.1 (A) 0.5 - 2.0 mmol/L Final   • HCG Qualitative 05/12/2022 Negative  Negative Final   • Extra Tube 05/12/2022 Hold for add-ons.   Final    Auto resulted.   • Extra Tube 05/12/2022 hold for add-on   Final    Auto resulted   • Extra Tube 05/12/2022 Hold for add-ons.   Final    Auto resulted   • WBC 05/12/2022 5.50  3.40 - 10.80 10*3/mm3 Final   • RBC 05/12/2022 3.91  3.77 - 5.28 10*6/mm3 Final   • Hemoglobin 05/12/2022 12.5  12.0 - 15.9 g/dL Final   • Hematocrit 05/12/2022 35.5  34.0 - 46.6 % Final   • MCV 05/12/2022 90.8  79.0 - 97.0 fL Final   • MCH 05/12/2022 32.0  26.6 - 33.0 pg Final   • MCHC 05/12/2022 35.2  31.5 - 35.7 g/dL Final   • RDW 05/12/2022 13.5  12.3 - 15.4 % Final   • RDW-SD 05/12/2022 44.5  37.0 - 54.0 fl Final   • MPV 05/12/2022 8.7  6.0 - 12.0 fL Final   • Platelets 05/12/2022 237  140 - 450 10*3/mm3 Final   • Scan Slide 05/12/2022    Final    See Manual Differential Results   • Neutrophil % 05/12/2022 60.0  42.7 - 76.0 % Final   • Lymphocyte % 05/12/2022 26.0  19.6 - 45.3 % Final   • Monocyte % 05/12/2022 12.0  5.0 - 12.0 % Final   • Eosinophil % 05/12/2022 1.0  0.3 - 6.2 % Final   • Basophil % 05/12/2022 1.0  0.0 - 1.5 % Final   •  Neutrophils Absolute 05/12/2022 3.30  1.70 - 7.00 10*3/mm3 Final   • Lymphocytes Absolute 05/12/2022 1.43  0.70 - 3.10 10*3/mm3 Final   • Monocytes Absolute 05/12/2022 0.66  0.10 - 0.90 10*3/mm3 Final   • Eosinophils Absolute 05/12/2022 0.06  0.00 - 0.40 10*3/mm3 Final   • Basophils Absolute 05/12/2022 0.06  0.00 - 0.20 10*3/mm3 Final   • RBC Morphology 05/12/2022 Normal  Normal Final   • Platelet Estimate 05/12/2022 Adequate  Normal Final   • Lactate 05/12/2022 3.1 (A) 0.5 - 2.0 mmol/L Final   • Color, UA 05/12/2022 Yellow  Yellow, Straw Final   • Appearance, UA 05/12/2022 Clear  Clear Final   • pH, UA 05/12/2022 5.5  5.0 - 8.0 Final   • Specific Gravity, UA 05/12/2022 1.020  1.005 - 1.030 Final   • Glucose, UA 05/12/2022 250 mg/dL (1+) (A) Negative Final   • Ketones, UA 05/12/2022 Negative  Negative Final   • Bilirubin, UA 05/12/2022 Negative  Negative Final   • Blood, UA 05/12/2022 Moderate (2+) (A) Negative Final   • Protein, UA 05/12/2022 30 mg/dL (1+) (A) Negative Final   • Leuk Esterase, UA 05/12/2022 Small (1+) (A) Negative Final   • Nitrite, UA 05/12/2022 Negative  Negative Final   • Urobilinogen, UA 05/12/2022 0.2 E.U./dL  0.2 - 1.0 E.U./dL Final   • RBC, UA 05/12/2022 3-5 (A) None Seen, 0-2 /HPF Final   • WBC, UA 05/12/2022 31-50 (A) None Seen, 0-2 /HPF Final   • Bacteria, UA 05/12/2022 Trace (A) None Seen /HPF Final   • Squamous Epithelial Cells, UA 05/12/2022 0-2  None Seen, 0-2 /HPF Final   • Yeast, UA 05/12/2022 Small/1+ Budding Yeast  None Seen /HPF Final   • Hyaline Casts, UA 05/12/2022 None Seen  None Seen /LPF Final   • Methodology 05/12/2022 Manual Light Microscopy   Final   There may be more visits with results that are not included.        Procedure:       Assessment/Plan:   Left renal calculus its been encompassed by tissue recommend ureteroscopy with laser removal I Megan set this up for Dr. Martinez in [place]                  This document has been electronically signed by BRITTANY ELIZONDO,  MD July 7, 2022 08:42 EDT

## 2022-07-07 NOTE — TELEPHONE ENCOUNTER
PA started for Janumet . PA approved.   <<-----Click on this checkbox to enter Procedure Dilation and curettage  03/26/2018    Active  [id number]

## 2022-07-11 ENCOUNTER — TELEPHONE (OUTPATIENT)
Dept: UROLOGY | Facility: CLINIC | Age: 37
End: 2022-07-11

## 2022-07-11 DIAGNOSIS — N20.0 RENAL CALCULUS, LEFT: Primary | ICD-10-CM

## 2022-07-11 NOTE — TELEPHONE ENCOUNTER
Caller: MAGO GOMEZ    Relationship to patient: SELF    Best call back number: [phone]    Patient is needing: PT CALLED HUB TO SPEAK WITH MEDICAL STAFF REGARDING SURGERY SHE WAS SUPPOSED TO HEAR BACK ABOUT IN [place].    ALSO STATES SINCE YESTERDAY SHE'S STILL HAVING FLANK PAIN AND NOW DIFFICULTY URINATING.    HUB UNABLE TO WARM TX.

## 2022-07-14 ENCOUNTER — APPOINTMENT (OUTPATIENT)
Dept: CT IMAGING | Facility: HOSPITAL | Age: 37
End: 2022-07-14

## 2022-07-14 ENCOUNTER — HOSPITAL ENCOUNTER (EMERGENCY)
Facility: HOSPITAL | Age: 37
Discharge: HOME | End: 2022-07-14
Attending: STUDENT IN AN ORGANIZED HEALTH CARE EDUCATION/TRAINING PROGRAM | Admitting: STUDENT IN AN ORGANIZED HEALTH CARE EDUCATION/TRAINING PROGRAM

## 2022-07-14 VITALS
HEART RATE: 105 BPM | OXYGEN SATURATION: 96 % | SYSTOLIC BLOOD PRESSURE: 133 MMHG | DIASTOLIC BLOOD PRESSURE: 85 MMHG | WEIGHT: 260 LBS | BODY MASS INDEX: 44.39 KG/M2 | RESPIRATION RATE: 18 BRPM | HEIGHT: 64 IN | TEMPERATURE: 98.5 F

## 2022-07-14 DIAGNOSIS — N39.0 UTI (URINARY TRACT INFECTION), UNCOMPLICATED: Primary | ICD-10-CM

## 2022-07-14 LAB
ACETONE BLD QL: NEGATIVE
ALBUMIN SERPL-MCNC: 4.46 G/DL (ref 3.5–5.2)
ALBUMIN/GLOB SERPL: 1.5 G/DL
ALP SERPL-CCNC: 121 U/L (ref 39–117)
ALT SERPL W P-5'-P-CCNC: 26 U/L (ref 1–33)
ANION GAP SERPL CALCULATED.3IONS-SCNC: 17.1 MMOL/L (ref 5–15)
AST SERPL-CCNC: 32 U/L (ref 1–32)
BACTERIA UR QL AUTO: ABNORMAL /HPF
BASOPHILS # BLD AUTO: 0.03 10*3/MM3 (ref 0–0.2)
BASOPHILS NFR BLD AUTO: 0.6 % (ref 0–1.5)
BILIRUB SERPL-MCNC: 0.3 MG/DL (ref 0–1.2)
BILIRUB UR QL STRIP: NEGATIVE
BUN SERPL-MCNC: 20 MG/DL (ref 6–20)
BUN/CREAT SERPL: 31.3 (ref 7–25)
CALCIUM SPEC-SCNC: 9.6 MG/DL (ref 8.6–10.5)
CHLORIDE SERPL-SCNC: 95 MMOL/L (ref 98–107)
CLARITY UR: ABNORMAL
CO2 SERPL-SCNC: 18.9 MMOL/L (ref 22–29)
COLOR UR: YELLOW
CREAT SERPL-MCNC: 0.64 MG/DL (ref 0.57–1)
CRP SERPL-MCNC: 2.28 MG/DL (ref 0–0.5)
D-LACTATE SERPL-SCNC: 4.8 MMOL/L (ref 0.5–2)
D-LACTATE SERPL-SCNC: 4.9 MMOL/L (ref 0.5–2)
DEPRECATED RDW RBC AUTO: 42.7 FL (ref 37–54)
EGFRCR SERPLBLD CKD-EPI 2021: 117.6 ML/MIN/1.73
EOSINOPHIL # BLD AUTO: 0.12 10*3/MM3 (ref 0–0.4)
EOSINOPHIL NFR BLD AUTO: 2.5 % (ref 0.3–6.2)
ERYTHROCYTE [DISTWIDTH] IN BLOOD BY AUTOMATED COUNT: 13 % (ref 12.3–15.4)
GLOBULIN UR ELPH-MCNC: 2.9 GM/DL
GLUCOSE BLDC GLUCOMTR-MCNC: 345 MG/DL (ref 70–130)
GLUCOSE SERPL-MCNC: 413 MG/DL (ref 65–99)
GLUCOSE UR STRIP-MCNC: ABNORMAL MG/DL
HBA1C MFR BLD: 8.5 % (ref 4.8–5.6)
HCT VFR BLD AUTO: 34 % (ref 34–46.6)
HGB BLD-MCNC: 11.9 G/DL (ref 12–15.9)
HGB UR QL STRIP.AUTO: ABNORMAL
HOLD SPECIMEN: NORMAL
HOLD SPECIMEN: NORMAL
HYALINE CASTS UR QL AUTO: ABNORMAL /LPF
IMM GRANULOCYTES # BLD AUTO: 0.04 10*3/MM3 (ref 0–0.05)
IMM GRANULOCYTES NFR BLD AUTO: 0.8 % (ref 0–0.5)
KETONES UR QL STRIP: ABNORMAL
LEUKOCYTE ESTERASE UR QL STRIP.AUTO: ABNORMAL
LYMPHOCYTES # BLD AUTO: 1.37 10*3/MM3 (ref 0.7–3.1)
LYMPHOCYTES NFR BLD AUTO: 28.3 % (ref 19.6–45.3)
MCH RBC QN AUTO: 32.2 PG (ref 26.6–33)
MCHC RBC AUTO-ENTMCNC: 35 G/DL (ref 31.5–35.7)
MCV RBC AUTO: 92.1 FL (ref 79–97)
MONOCYTES # BLD AUTO: 0.48 10*3/MM3 (ref 0.1–0.9)
MONOCYTES NFR BLD AUTO: 9.9 % (ref 5–12)
NEUTROPHILS NFR BLD AUTO: 2.8 10*3/MM3 (ref 1.7–7)
NEUTROPHILS NFR BLD AUTO: 57.9 % (ref 42.7–76)
NITRITE UR QL STRIP: NEGATIVE
NRBC BLD AUTO-RTO: 0 /100 WBC (ref 0–0.2)
PH UR STRIP.AUTO: 5.5 [PH] (ref 5–8)
PLATELET # BLD AUTO: 218 10*3/MM3 (ref 140–450)
PMV BLD AUTO: 8.9 FL (ref 6–12)
POTASSIUM SERPL-SCNC: 4 MMOL/L (ref 3.5–5.2)
PROT SERPL-MCNC: 7.4 G/DL (ref 6–8.5)
PROT UR QL STRIP: ABNORMAL
RBC # BLD AUTO: 3.69 10*6/MM3 (ref 3.77–5.28)
RBC # UR STRIP: ABNORMAL /HPF
REF LAB TEST METHOD: ABNORMAL
SODIUM SERPL-SCNC: 131 MMOL/L (ref 136–145)
SP GR UR STRIP: 1.02 (ref 1–1.03)
SQUAMOUS #/AREA URNS HPF: ABNORMAL /HPF
UROBILINOGEN UR QL STRIP: ABNORMAL
WBC # UR STRIP: ABNORMAL /HPF
WBC NRBC COR # BLD: 4.84 10*3/MM3 (ref 3.4–10.8)
WHOLE BLOOD HOLD COAG: NORMAL
WHOLE BLOOD HOLD SPECIMEN: NORMAL
YEAST URNS QL MICRO: ABNORMAL /HPF

## 2022-07-14 PROCEDURE — 74176 CT ABD & PELVIS W/O CONTRAST: CPT | Performed by: RADIOLOGY

## 2022-07-14 PROCEDURE — 36415 COLL VENOUS BLD VENIPUNCTURE: CPT

## 2022-07-14 PROCEDURE — 25010000002 ONDANSETRON PER 1 MG: Performed by: STUDENT IN AN ORGANIZED HEALTH CARE EDUCATION/TRAINING PROGRAM

## 2022-07-14 PROCEDURE — 99284 EMERGENCY DEPT VISIT MOD MDM: CPT

## 2022-07-14 PROCEDURE — 96361 HYDRATE IV INFUSION ADD-ON: CPT

## 2022-07-14 PROCEDURE — 85025 COMPLETE CBC W/AUTO DIFF WBC: CPT | Performed by: PHYSICIAN ASSISTANT

## 2022-07-14 PROCEDURE — 87086 URINE CULTURE/COLONY COUNT: CPT | Performed by: PHYSICIAN ASSISTANT

## 2022-07-14 PROCEDURE — 25010000002 HYDROMORPHONE PER 4 MG: Performed by: STUDENT IN AN ORGANIZED HEALTH CARE EDUCATION/TRAINING PROGRAM

## 2022-07-14 PROCEDURE — 83036 HEMOGLOBIN GLYCOSYLATED A1C: CPT | Performed by: PHYSICIAN ASSISTANT

## 2022-07-14 PROCEDURE — 87040 BLOOD CULTURE FOR BACTERIA: CPT | Performed by: PHYSICIAN ASSISTANT

## 2022-07-14 PROCEDURE — 80053 COMPREHEN METABOLIC PANEL: CPT | Performed by: PHYSICIAN ASSISTANT

## 2022-07-14 PROCEDURE — 96365 THER/PROPH/DIAG IV INF INIT: CPT

## 2022-07-14 PROCEDURE — 82009 KETONE BODYS QUAL: CPT | Performed by: PHYSICIAN ASSISTANT

## 2022-07-14 PROCEDURE — 25010000002 CEFTRIAXONE PER 250 MG: Performed by: PHYSICIAN ASSISTANT

## 2022-07-14 PROCEDURE — 96376 TX/PRO/DX INJ SAME DRUG ADON: CPT

## 2022-07-14 PROCEDURE — 83605 ASSAY OF LACTIC ACID: CPT | Performed by: PHYSICIAN ASSISTANT

## 2022-07-14 PROCEDURE — 74176 CT ABD & PELVIS W/O CONTRAST: CPT

## 2022-07-14 PROCEDURE — 86140 C-REACTIVE PROTEIN: CPT | Performed by: PHYSICIAN ASSISTANT

## 2022-07-14 PROCEDURE — 81001 URINALYSIS AUTO W/SCOPE: CPT | Performed by: PHYSICIAN ASSISTANT

## 2022-07-14 PROCEDURE — 96375 TX/PRO/DX INJ NEW DRUG ADDON: CPT

## 2022-07-14 PROCEDURE — 63710000001 INSULIN REGULAR HUMAN PER 5 UNITS: Performed by: PHYSICIAN ASSISTANT

## 2022-07-14 PROCEDURE — 82962 GLUCOSE BLOOD TEST: CPT

## 2022-07-14 RX ORDER — HYDROMORPHONE HYDROCHLORIDE 1 MG/ML
0.5 INJECTION, SOLUTION INTRAMUSCULAR; INTRAVENOUS; SUBCUTANEOUS ONCE
Status: COMPLETED | OUTPATIENT
Start: 2022-07-14 | End: 2022-07-14

## 2022-07-14 RX ORDER — CEFDINIR 300 MG/1
300 CAPSULE ORAL 2 TIMES DAILY
Qty: 14 CAPSULE | Refills: 0 | Status: SHIPPED | OUTPATIENT
Start: 2022-07-14 | End: 2022-08-24

## 2022-07-14 RX ORDER — ONDANSETRON 2 MG/ML
4 INJECTION INTRAMUSCULAR; INTRAVENOUS ONCE
Status: COMPLETED | OUTPATIENT
Start: 2022-07-14 | End: 2022-07-14

## 2022-07-14 RX ORDER — SODIUM CHLORIDE 0.9 % (FLUSH) 0.9 %
10 SYRINGE (ML) INJECTION AS NEEDED
Status: DISCONTINUED | OUTPATIENT
Start: 2022-07-14 | End: 2022-07-14 | Stop reason: HOSPADM

## 2022-07-14 RX ADMIN — SODIUM CHLORIDE 1000 ML: 9 INJECTION, SOLUTION INTRAVENOUS at 12:05

## 2022-07-14 RX ADMIN — HYDROMORPHONE HYDROCHLORIDE 0.5 MG: 1 INJECTION, SOLUTION INTRAMUSCULAR; INTRAVENOUS; SUBCUTANEOUS at 14:07

## 2022-07-14 RX ADMIN — HYDROMORPHONE HYDROCHLORIDE 0.5 MG: 1 INJECTION, SOLUTION INTRAMUSCULAR; INTRAVENOUS; SUBCUTANEOUS at 12:03

## 2022-07-14 RX ADMIN — CEFTRIAXONE 1 G: 1 INJECTION, POWDER, FOR SOLUTION INTRAMUSCULAR; INTRAVENOUS at 12:35

## 2022-07-14 RX ADMIN — HUMAN INSULIN 10 UNITS: 100 INJECTION, SOLUTION SUBCUTANEOUS at 14:07

## 2022-07-14 RX ADMIN — ONDANSETRON 4 MG: 2 INJECTION INTRAMUSCULAR; INTRAVENOUS at 12:04

## 2022-07-15 LAB — BACTERIA SPEC AEROBE CULT: NORMAL

## 2022-07-18 ENCOUNTER — OFFICE VISIT (OUTPATIENT)
Dept: UROLOGY | Facility: CLINIC | Age: 37
End: 2022-07-18

## 2022-07-18 VITALS
TEMPERATURE: 97.4 F | OXYGEN SATURATION: 99 % | DIASTOLIC BLOOD PRESSURE: 94 MMHG | HEART RATE: 102 BPM | WEIGHT: 260 LBS | SYSTOLIC BLOOD PRESSURE: 146 MMHG | BODY MASS INDEX: 44.39 KG/M2 | HEIGHT: 64 IN

## 2022-07-18 DIAGNOSIS — N28.89 URETERAL MASS: Primary | ICD-10-CM

## 2022-07-18 DIAGNOSIS — N20.0 KIDNEY STONE: Primary | ICD-10-CM

## 2022-07-18 DIAGNOSIS — N28.89 URETERAL MASS: ICD-10-CM

## 2022-07-18 LAB
BILIRUB BLD-MCNC: NEGATIVE MG/DL
CLARITY, POC: ABNORMAL
COLOR UR: YELLOW
EXPIRATION DATE: ABNORMAL
GLUCOSE UR STRIP-MCNC: ABNORMAL MG/DL
KETONES UR QL: ABNORMAL
LEUKOCYTE EST, POC: ABNORMAL
Lab: ABNORMAL
NITRITE UR-MCNC: NEGATIVE MG/ML
PH UR: 6 [PH] (ref 5–8)
PROT UR STRIP-MCNC: ABNORMAL MG/DL
RBC # UR STRIP: ABNORMAL /UL
SP GR UR: 1.03 (ref 1–1.03)
UROBILINOGEN UR QL: NORMAL

## 2022-07-18 PROCEDURE — 99214 OFFICE O/P EST MOD 30 MIN: CPT | Performed by: UROLOGY

## 2022-07-18 NOTE — PROGRESS NOTES
Chief Complaint  Chief Complaint   Patient presents with   • Flank Pain     New patient     Referring Provider:  No ref. provider found    HPI  Ms. Arzate is a 37 y.o. female with extensive below past medical history who presents with nephrolithiasis, possible left proximal ureteral mass, possible retroperitoneal sinus tract from the ureter.    She presents with an extensive urologic history of multiple ureteroscopy's, ESWL, and stent placement over the past 9 months.  She now currently has a stent in place, complains of chronic left side and flank pain.  CT demonstrates a possible sinus from the proximal ureter that goes into the psoas muscle.  On most recent ureteroscopy Dr. Motley noted a possible mass in the proximal left ureter.  On CT imaging there appear to be calcifications surrounding it.    She reports that she was hospitalized for over 50 days at [facility], had a CVA associated with urinary sepsis, and has residual left-sided weakness and is in a wheelchair.      Past Medical History  Past Medical History:   Diagnosis Date   • A-fib (HCC)    • Abnormal ECG    • Anemia    • Anxiety    • Asthma    • Cancer (HCC)     Ovarian   • Depression    • Diabetes mellitus (HCC)    • DVT (deep venous thrombosis) (HCC)    • Factor 5 Leiden mutation, heterozygous (HCC)    • Fibroid    • GERD (gastroesophageal reflux disease)    • Gout    • H/O abdominal abscess    • History of sepsis    • History of transfusion    • Hyperlipidemia    • Hypothyroid    • Kidney stone    • Migraines    • Neuropathy    • Ovarian cancer (HCC) 2021   • Ovarian cyst    • PE (pulmonary embolism)    • Polycystic ovary syndrome    • Preeclampsia    • Rh incompatibility    • Stroke (HCC)    • TIA (transient ischemic attack)    • Urinary tract infection    • Varicella        Past Surgical History  Past Surgical History:   Procedure Laterality Date   • ABDOMINAL SURGERY     • CARDIAC CATHETERIZATION     •   SECTION     • CHOLECYSTECTOMY     • COLONOSCOPY     • ENDOSCOPY     • EXTRACORPOREAL SHOCK WAVE LITHOTRIPSY (ESWL) Left 10/22/2021    Procedure: EXTRACORPOREAL SHOCKWAVE LITHOTRIPSY;  Surgeon: Milan Motley MD;  Location:  [facility];  Service: Urology;  Laterality: Left;   • LITHOTRIPSY     • RIGHT OOPHORECTOMY     • URETEROSCOPY LASER LITHOTRIPSY WITH STENT INSERTION Left 10/01/2021    Procedure: URETEROSCOPY WITH STENT PLACEMENT;  Surgeon: Milan Motley MD;  Location:  [facility];  Service: Urology;  Laterality: Left;   • URETEROSCOPY LASER LITHOTRIPSY WITH STENT INSERTION Left 4/27/2022    Procedure: URETEROSCOPY STENT REMOVAL;  Surgeon: Milan Motley MD;  Location:  [facility];  Service: Urology;  Laterality: Left;   • URETEROSCOPY LASER LITHOTRIPSY WITH STENT INSERTION Left 6/29/2022    Procedure: CYSTOSCOPY RETROGRADE LEFT WITH STENT PLACEMENT;  Surgeon: Milan Motley MD;  Location:  [facility];  Service: Urology;  Laterality: Left;       Medications    Current Outpatient Medications:   •  albuterol (PROVENTIL) (2.5 MG/3ML) 0.083% nebulizer solution, Take 2.5 mg by nebulization Every 6 (Six) Hours As Needed for Wheezing or Shortness of Air., Disp: 150 mL, Rfl: 3  •  albuterol sulfate  (90 Base) MCG/ACT inhaler, Inhale 2 puffs Every 4 (Four) Hours As Needed for Wheezing., Disp: 18 g, Rfl: 2  •  allopurinol (ZYLOPRIM) 100 MG tablet, Take 1 tablet by mouth Daily., Disp: 30 tablet, Rfl: 0  •  amLODIPine (NORVASC) 10 MG tablet, Take 1 tablet by mouth Daily., Disp: 30 tablet, Rfl: 2  •  apixaban (ELIQUIS) 5 MG tablet tablet, Take 1 tablet by mouth 2 (Two) Times a Day., Disp: 60 tablet, Rfl: 2  •  azelastine (ASTELIN) 0.1 % nasal spray, 2 sprays both nostrils twice daily as needed, Disp: 1 each, Rfl: 2  •  cefdinir (OMNICEF) 300 MG capsule, Take 1 capsule by mouth 2 (Two) Times a Day., Disp: 14 capsule, Rfl: 0  •  cyanocobalamin ([employer] Vitamin B-12) 2000 MCG tablet, Take 1 tablet  "by mouth Daily., Disp: 30 tablet, Rfl: 3  •  DULoxetine (CYMBALTA) 20 MG capsule, Take 1 capsule by mouth Daily., Disp: 30 capsule, Rfl: 2  •  gabapentin (NEURONTIN) 300 MG capsule, Take 1 capsule by mouth 2 (Two) Times a Day for 30 days., Disp: 60 capsule, Rfl: 0  •  glucose blood test strip, 1 each by Other route 3 (Three) Times a Day., Disp: 100 each, Rfl: 2  •  HYDROcodone-acetaminophen (NORCO) 5-325 MG per tablet, Take 1 tablet by mouth Every 4 (Four) Hours As Needed for Mild Pain ., Disp: 12 tablet, Rfl: 0  •  HYDROcodone-acetaminophen (NORCO) 5-325 MG per tablet, Take 1 tablet by mouth Every 6 (Six) Hours As Needed for Mild Pain ., Disp: 5 tablet, Rfl: 0  •  insulin glargine (LANTUS, SEMGLEE) 100 UNIT/ML injection, Inject 50 Units under the skin into the appropriate area as directed Daily for 30 days., Disp: 15 mL, Rfl: 0  •  Insulin Pen Needle 30G X 8 MM misc, 1 Device Daily., Disp: 100 each, Rfl: 0  •  Insulin Syringe-Needle U-100 (GNP Insulin Syringes) 30G X 5/16\" 1 ML misc, 1 Device Daily., Disp: 30 each, Rfl: 0  •  Lancets Micro Thin 33G misc, 1 Device 3 (Three) Times a Day., Disp: 100 each, Rfl: 2  •  metoprolol succinate XL (TOPROL-XL) 50 MG 24 hr tablet, Take 1 tablet by mouth Daily., Disp: 30 tablet, Rfl: 0  •  montelukast (SINGULAIR) 10 MG tablet, Take 1 tablet by mouth Every Night., Disp: 30 tablet, Rfl: 2  •  ondansetron ODT (ZOFRAN-ODT) 4 MG disintegrating tablet, Place 1 tablet on the tongue Every 6 (Six) Hours As Needed for Vomiting., Disp: 12 tablet, Rfl: 0  •  oxyCODONE-acetaminophen (Percocet)  MG per tablet, Take 1 tablet by mouth Every 4 (Four) Hours As Needed for Moderate Pain., Disp: 12 tablet, Rfl: 0  •  oxyCODONE-acetaminophen (Percocet)  MG per tablet, Take 1 tablet by mouth Every 4 (Four) Hours As Needed for Moderate Pain  (Pain)., Disp: 12 tablet, Rfl: 0  •  oxyCODONE-acetaminophen (Percocet)  MG per tablet, Take 1 tablet by mouth Every 6 (Six) Hours As Needed for " "Moderate Pain ., Disp: 8 tablet, Rfl: 0  •  polyethylene glycol (MiraLax) 17 g packet, Take 17 g by mouth Daily., Disp: 30 each, Rfl: 3  •  promethazine (PHENERGAN) 25 MG tablet, Take 1 tablet by mouth Every 6 (Six) Hours As Needed for Nausea or Vomiting., Disp: 21 tablet, Rfl: 2  •  promethazine-dextromethorphan (PROMETHAZINE-DM) 6.25-15 MG/5ML syrup, Take 5 mL by mouth 2 (Two) Times a Day As Needed for Cough., Disp: 180 mL, Rfl: 1  •  Respiratory Therapy Supplies (Nebulizer/Tubing/Mouthpiece) kit, 1 each Take As Directed., Disp: 1 each, Rfl: 0  •  senna (Senokot) 8.6 MG tablet, Take 2 tablets by mouth Daily., Disp: 60 tablet, Rfl: 3  •  sitaGLIPtin-metFORMIN (Janumet)  MG per tablet, Start with one with supper for one week and if tolerated increase to one twice a day, Disp: 60 tablet, Rfl: 0  •  tamsulosin (FLOMAX) 0.4 MG capsule 24 hr capsule, Take 1 capsule by mouth Daily., Disp: 30 capsule, Rfl: 0  •  vitamin D (ERGOCALCIFEROL) 1.25 MG (76468 UT) capsule capsule, Take 50,000 Units by mouth 1 (One) Time Per Week. Prior to [facility] Admission, Patient was on:  takes on saturday, Disp: , Rfl:     Allergies  Allergies   Allergen Reactions   • Toradol [Ketorolac Tromethamine] Anaphylaxis and Hives   • Haldol [Haloperidol] Hives and Mental Status Change   • Tramadol Hives and Swelling   • Amoxicillin Hives and Rash   • Penicillins Hives and Rash       Social History  Social History     Socioeconomic History   • Marital status:    Tobacco Use   • Smoking status: Never Smoker   • Smokeless tobacco: Never Used   Vaping Use   • Vaping Use: Never used   Substance and Sexual Activity   • Alcohol use: No   • Drug use: Never     Comment: Pt has track marks on right arm. Pt states \"It's from my INR being drawn.\"    • Sexual activity: Not Currently     Partners: Male     Birth control/protection: None       Review of Systems  Review of systems was notable for urinary sepsis, extended hospitalization, recurrent " "urinary infections, CVA.    Physical Exam  Visit Vitals  /94   Pulse 102   Temp 97.4 °F (36.3 °C)   Ht 162.6 cm (64\")   Wt 118 kg (260 lb)   SpO2 99%   BMI 44.63 kg/m²     Physical exam was notable for morbid obesity and wheelchair-bound.    Labs  Brief Urine Lab Results  (Last result in the past 365 days)      Color   Clarity   Blood   Leuk Est   Nitrite   Protein   CREAT   Urine HCG        07/18/22 1514 Yellow   Cloudy   3+   Large (3+)   Negative   2+                 Urine Culture    Urine Culture 6/15/22 6/24/22 7/14/22   Urine Culture >100,000 CFU/mL Escherichia coli (A) 50,000 CFU/mL Mixed Margo Isolated >100,000 CFU/mL Mixed Margo Isolated   (A) Abnormal value               Lab Results   Component Value Date    GLUCOSE 413 (C) 07/14/2022    CALCIUM 9.6 07/14/2022     (L) 07/14/2022    K 4.0 07/14/2022    CO2 18.9 (L) 07/14/2022    CL 95 (L) 07/14/2022    BUN 20 07/14/2022    CREATININE 0.64 07/14/2022    EGFRIFAFRI >60 05/20/2022    EGFRIFNONA >60 05/20/2022    BCR 31.3 (H) 07/14/2022    ANIONGAP 17.1 (H) 07/14/2022       Lab Results   Component Value Date    WBC 4.84 07/14/2022    HGB 11.9 (L) 07/14/2022    HCT 34.0 07/14/2022    MCV 92.1 07/14/2022     07/14/2022     Lab Results   Component Value Date    HGBA1C 8.50 (H) 07/14/2022       Radiographic Studies  CT Abdomen Pelvis Without Contrast  Result Date: 5/20/2022  1. Pelvocaliectasis involving left renal collecting system with filling defect in the lower pole likely representing noncalcified debris. Additionally there appears to be a sinus tract extending from the left collecting system ending in the region of the left psoas muscle as detailed above. 2. No convincing renal calculi or ureteral calculi are noted. However, the presence of residual contrast from procedure earlier today somewhat limits this evaluation. 3. There is no evidence of an intra-abdominal or intrapelvic fluid collection or acute infection. 4. The bladder is grossly " distended with layering contrast material. 5. Moderate stool burden in the sigmoid colon and rectum. CRITICAL RESULT:   No. COMMUNICATION: Per this written report..   Approved by Neo Murphy M.D. on 5/20/2022 3:50 PM By electronically signing this report, I, the attending physician, attest that I have personally reviewed the images/data for the above examination(s) and agree with the final edited report. Dictated by Neo Murphy M.D. on 5/20/2022 3:50 PM Signed by Bakari Jorgensen on 5/20/2022 4:50 PM    CT Abdomen Pelvis wo IV Contrast    Result Date: 4/6/2022  Redemonstration of left ureteral stent, with adjacent hyperattenuating focus approximately at the ureteropelvic junction with surrounding soft tissue. Redemonstration of left-sided perinephric fat stranding. Nonobstructive right-sided renal calculus..   CRITICAL RESULT:   No. COMMUNICATION: Per this written report.. Approved by Clinton Calvin M.D. on 4/6/2022 10:10 AM By electronically signing this report, I, the attending physician, attest that I have personally reviewed the images/data for the above examination(s) and agree with the final edited report. Dictated by Clinton Calvin M.D. on 4/6/2022 10:10 AM Signed by Italo Menjivar on 4/6/2022 10:33 AM    CT Abdomen Pelvis Without Contrast    Result Date: 4/4/2022  1. Indwelling left ureteral stent again noted. No change in indeterminate area of mixed calcification in soft tissue posterior to the left renal pelvis. This is of uncertain etiology and clinical significance. Consider nonemergent follow-up with MRI of the abdomen or multiphase contrast-enhanced CT of the abdomen/pelvis. 2. Nonobstructing right intrarenal calculi. 3. Indwelling left lower quadrant abdominal drain with trace fluid versus soft tissue thickening in the left paracolic gutter, unchanged from the prior exam. 4. Normal appendix. 5. Moderate stool burden. 6. Hepatosplenomegaly. 7. Cholecystectomy. Signer Name: Danial Weber MD  Signed:  4/4/2022 10:52 PM  Workstation Name: [facility]    XR Shoulder 2+ View Left    Result Date: 6/2/2022    No acute findings in the left shoulder.  This report was finalized on 6/2/2022 12:48 PM by Dr. Pj Lee MD.      XR Femur 2 View Left    Result Date: 4/4/2022  Negative left femur. Signer Name: Danial Weber MD  Signed: 4/4/2022 9:55 PM  Workstation Name: [facility]    XR Abdomen 2+ VW with Chest 1 VW    Result Date: 4/10/2022  1. Well-positioned left ureteral stent. Stone material near the left upper ureter. 2. Bowel gas pattern is within normal limits. 3. Hepatomegaly. 4. Cholecystectomy. IVC filter. 5. Negative chest x-ray. Lungs clear. Signer Name: Esteban Banegas MD  Signed: 4/10/2022 1:03 AM  Workstation Name: [facility]    CT Chest Without Contrast Diagnostic    Result Date: 4/4/2022  Mild cardiomegaly. No other acute chest findings. Signer Name: Danial Weber MD  Signed: 4/4/2022 10:42 PM  Workstation Name: [facility]    US Pelvis Complete    Result Date: 6/10/2022  No complex mass or free fluid. Left ovary unable to be visualized  This report was finalized on 6/10/2022 4:51 PM by Dr. Ajay De Guzman MD.      CT Abdomen Pelvis With Contrast    Result Date: 3/31/2022  1. Left ureteral stent in place, appropriately positioned. Persistent decreased left nephrogram with subtle left perinephric stranding, compatible with persisting pyelonephritis. Similar appearance of calcifications with surrounding soft tissue thickening posterior to the stent at the level of the left UPJ. This likely represents previous urolithiasis extruded through the UPJ or proximal ureter at the time of attempted removal. This calcification may be serving as a persistent nidus of infection. 2. Nonobstructing right nephrolithiasis. 3. Percutaneous drain currently within the  lateral lower left retroperitoneum, with more optimal positioning compared to prior. Previously noted collection at this site appears to have decompressed with trace residual gas in the cavity. CRITICAL RESULT:   No. COMMUNICATION: Per this written report. Approved by Dayanna Baptiste on 3/31/2022 5:56 PM By electronically signing this report, I, the attending physician, attest that I have personally reviewed the images/data for the above examination(s) and agree with the final edited report. Dictated by Dayanna Baptiste on 3/31/2022 5:56 PM Signed by John Paul Hopper on 3/31/2022 7:03 PM    US Renal Limited    Result Date: 6/29/2022  Moderate left-sided pelvocaliectasis.  This report was finalized on 6/29/2022 3:38 PM by Dr. Ajay De Guzman MD.      XR Chest 1 View    Result Date: 3/23/2022    Unremarkable chest. No acute cardiopulmonary findings.  This report was finalized on 3/23/2022 1:50 PM by Dr. Spencer Hightower MD.      US Venous Doppler Lower Extremity Left (duplex)    Result Date: 3/28/2022    Normal left lower extremity duplex venous ultrasound.  This report was finalized on 3/28/2022 3:27 PM by Dr. Pj Lee MD.      CT Abdomen Pelvis Stone Protocol    Result Date: 7/14/2022  1.  Bladder wall thickening which may be due to the decompressed state of the bladder or due to cystitis. 2.  No significant left hydronephrosis.  This report was finalized on 7/14/2022 12:23 PM by Dr. Pj Lee MD.      CT Abdomen Pelvis Stone Protocol    Result Date: 6/24/2022  1.  Today there is mild left hydronephrosis with surrounding stranding of the left kidney potentially representing upper pole moiety UPJ calculus obstruction of about 6 mm. 2.  Marked enlargement of fatty liver.  This report was finalized on 6/24/2022 11:31 AM by Dr. Pj Lee MD.      CT Abdomen Pelvis Stone Protocol    Result Date: 6/10/2022  1. Punctate nonobstructing right intrarenal calculi. 2. Persistent small focus of soft tissue calcification posterior  to the left renal hilum, unchanged from multiple prior examinations. This is of uncertain etiology and clinical significance and follow-up imaging with nonemergent multiphase contrast-enhanced MRI of the abdomen may be considered. No visible left-sided urinary tract stones. Questionable mild left-sided caliectasis. 3. Normal appendix. 4. Hepatosplenomegaly. 5. Cholecystectomy. Signer Name: Danial Weber MD  Signed: 6/10/2022 7:02 PM  Workstation Name: [facility]  [facility]    CT Abdomen Pelvis Stone Protocol    Result Date: 5/12/2022  1. Moderate left-sided hydronephrosis and proximal hydroureter secondary to multiple obstructing stones in the left renal pelvis and proximal left ureter. Similar appearance to prior. 2. Punctate nonobstructing right renal stones. 3. Normal appendix. 4. Hepatosplenomegaly. Signer Name: Kailash Patterson MD  Signed: 5/12/2022 9:12 PM  Workstation Name: [email or web address]  [facility]    CT Abdomen Pelvis Stone Protocol    Result Date: 5/8/2022  1.  Interval removal of left-sided double-J ureteral stent from prior study of 4/4/2022. 2.  There are multiple punctate calcifications demonstrated in the proximal left ureter. There is a moderate left-sided hydronephrosis. Signer Name: Bakari Weber MD  Signed: 5/8/2022 3:24 PM  Workstation Name: [email or web address]  [facility]    CT Abdomen Pelvis Stone Protocol    Result Date: 4/2/2022  1.. Drain in left lower quadrant relatively unchanged from study of 3/20/2022. Limited assessment for residual abscess without IV contrast. 2. Indeterminant area of mixed calcification seen posterior to the left renal pelvis, present on multiple prior studies in March but not seen in October 2021. Correlate clinically its etiology is uncertain. 3. Severe hepatomegaly and splenomegaly, correlate clinically.  Signer Name: Genia Martin MD  Signed: 4/2/2022 7:54 PM  Workstation Name: [email or web address]   [facility]    CT Abdomen Pelvis Stone Protocol    Result Date: 3/28/2022    Left double-J ureteral stent remains in position. No significant hydronephrosis.  Abundant distal colonic stool.  This report was finalized on 3/28/2022 4:14 PM by Dr. Pj Lee MD.      XR Abdomen KUB    Result Date: 6/21/2022    No acute findings.  This report was finalized on 6/21/2022 11:11 AM by Dr. Pj Lee MD.      XR abdomen kub    Result Date: 5/24/2022  A kidney stone is not demonstratable on the KUB.  This report was finalized on 5/24/2022 11:16 AM by Dr. Daniel Baron II, MD.      IR IVC Filter Removal    Result Date: 5/20/2022  Technically successful ultrasound and fluoroscopically guided IVC filter removal. CRITICAL RESULT: No. COMMUNICATION: Per this written report. By electronically signing this report, I, the attending physician, attest that I was present for the entire procedure(s) and agree with the final edited report. Dictated by Ike Crisostomo on 5/20/2022 1:44 PM Signed by Brandy Mcneill on 5/20/2022 2:25 PM    XR Hip With or Without Pelvis 2 - 3 View Left    Result Date: 4/4/2022  Negative left hip. Signer Name: Danial Weber MD  Signed: 4/4/2022 9:56 PM  Workstation Name: [facility]  [facility]        Assessment  Ms. Arzate is a 37 y.o. female with morbid obesity, history of urinary sepsis and CVA, prolonged hospitalization at , multiple recent endoscopic procedures to the left collecting system, currently with a stent in and possible left proximal ureteral mass in addition to sinus tract going into the left psoas muscle.    Given the patient's extensive medical history, past history of prolonged hospitalization at , the potential need for ureteral reconstruction associated with procedures, I told her that her pathology and the possible procedures needed to effectively treat it are beyond my skill.    Plan  1.  Referral to Dr. King at .      Panda  Ted Martinez MD

## 2022-07-19 ENCOUNTER — HOSPITAL ENCOUNTER (EMERGENCY)
Facility: HOSPITAL | Age: 37
Discharge: HOME | End: 2022-07-19
Attending: EMERGENCY MEDICINE | Admitting: EMERGENCY MEDICINE

## 2022-07-19 ENCOUNTER — APPOINTMENT (OUTPATIENT)
Dept: ULTRASOUND IMAGING | Facility: HOSPITAL | Age: 37
End: 2022-07-19

## 2022-07-19 VITALS
DIASTOLIC BLOOD PRESSURE: 94 MMHG | SYSTOLIC BLOOD PRESSURE: 152 MMHG | HEART RATE: 112 BPM | WEIGHT: 260 LBS | HEIGHT: 64 IN | BODY MASS INDEX: 44.39 KG/M2 | TEMPERATURE: 98.2 F | OXYGEN SATURATION: 96 % | RESPIRATION RATE: 20 BRPM

## 2022-07-19 DIAGNOSIS — N39.0 CHRONIC UTI: ICD-10-CM

## 2022-07-19 DIAGNOSIS — R10.2 PELVIC PAIN: Primary | ICD-10-CM

## 2022-07-19 LAB
ALBUMIN SERPL-MCNC: 4.54 G/DL (ref 3.5–5.2)
ALBUMIN/GLOB SERPL: 1.4 G/DL
ALP SERPL-CCNC: 142 U/L (ref 39–117)
ALT SERPL W P-5'-P-CCNC: 34 U/L (ref 1–33)
AMPHET+METHAMPHET UR QL: NEGATIVE
AMPHETAMINES UR QL: NEGATIVE
ANION GAP SERPL CALCULATED.3IONS-SCNC: 20 MMOL/L (ref 5–15)
AST SERPL-CCNC: 38 U/L (ref 1–32)
BACTERIA SPEC AEROBE CULT: NORMAL
BACTERIA SPEC AEROBE CULT: NORMAL
BACTERIA UR QL AUTO: ABNORMAL /HPF
BARBITURATES UR QL SCN: NEGATIVE
BASOPHILS # BLD AUTO: 0.03 10*3/MM3 (ref 0–0.2)
BASOPHILS NFR BLD AUTO: 0.5 % (ref 0–1.5)
BENZODIAZ UR QL SCN: NEGATIVE
BILIRUB SERPL-MCNC: 0.3 MG/DL (ref 0–1.2)
BILIRUB UR QL STRIP: NEGATIVE
BUN SERPL-MCNC: 13 MG/DL (ref 6–20)
BUN/CREAT SERPL: 19.4 (ref 7–25)
BUPRENORPHINE SERPL-MCNC: NEGATIVE NG/ML
CALCIUM SPEC-SCNC: 9.8 MG/DL (ref 8.6–10.5)
CANNABINOIDS SERPL QL: NEGATIVE
CHLORIDE SERPL-SCNC: 94 MMOL/L (ref 98–107)
CLARITY UR: ABNORMAL
CO2 SERPL-SCNC: 17 MMOL/L (ref 22–29)
COCAINE UR QL: NEGATIVE
COLOR UR: YELLOW
CREAT SERPL-MCNC: 0.67 MG/DL (ref 0.57–1)
CRP SERPL-MCNC: 3.17 MG/DL (ref 0–0.5)
DEPRECATED RDW RBC AUTO: 43.5 FL (ref 37–54)
EGFRCR SERPLBLD CKD-EPI 2021: 116.3 ML/MIN/1.73
EOSINOPHIL # BLD AUTO: 0.14 10*3/MM3 (ref 0–0.4)
EOSINOPHIL NFR BLD AUTO: 2.4 % (ref 0.3–6.2)
ERYTHROCYTE [DISTWIDTH] IN BLOOD BY AUTOMATED COUNT: 13.1 % (ref 12.3–15.4)
ERYTHROCYTE [SEDIMENTATION RATE] IN BLOOD: 41 MM/HR (ref 0–20)
GLOBULIN UR ELPH-MCNC: 3.3 GM/DL
GLUCOSE SERPL-MCNC: 312 MG/DL (ref 65–99)
GLUCOSE UR STRIP-MCNC: ABNORMAL MG/DL
HCT VFR BLD AUTO: 36.8 % (ref 34–46.6)
HGB BLD-MCNC: 12.7 G/DL (ref 12–15.9)
HGB UR QL STRIP.AUTO: ABNORMAL
HYALINE CASTS UR QL AUTO: ABNORMAL /LPF
IMM GRANULOCYTES # BLD AUTO: 0.02 10*3/MM3 (ref 0–0.05)
IMM GRANULOCYTES NFR BLD AUTO: 0.3 % (ref 0–0.5)
KETONES UR QL STRIP: ABNORMAL
LEUKOCYTE ESTERASE UR QL STRIP.AUTO: ABNORMAL
LYMPHOCYTES # BLD AUTO: 1.59 10*3/MM3 (ref 0.7–3.1)
LYMPHOCYTES NFR BLD AUTO: 27.4 % (ref 19.6–45.3)
MCH RBC QN AUTO: 31.8 PG (ref 26.6–33)
MCHC RBC AUTO-ENTMCNC: 34.5 G/DL (ref 31.5–35.7)
MCV RBC AUTO: 92 FL (ref 79–97)
METHADONE UR QL SCN: NEGATIVE
MONOCYTES # BLD AUTO: 0.46 10*3/MM3 (ref 0.1–0.9)
MONOCYTES NFR BLD AUTO: 7.9 % (ref 5–12)
NEUTROPHILS NFR BLD AUTO: 3.56 10*3/MM3 (ref 1.7–7)
NEUTROPHILS NFR BLD AUTO: 61.5 % (ref 42.7–76)
NITRITE UR QL STRIP: NEGATIVE
NRBC BLD AUTO-RTO: 0 /100 WBC (ref 0–0.2)
OPIATES UR QL: NEGATIVE
OXYCODONE UR QL SCN: NEGATIVE
PCP UR QL SCN: NEGATIVE
PH UR STRIP.AUTO: 5.5 [PH] (ref 5–8)
PLATELET # BLD AUTO: 242 10*3/MM3 (ref 140–450)
PMV BLD AUTO: 8.9 FL (ref 6–12)
POTASSIUM SERPL-SCNC: 3.9 MMOL/L (ref 3.5–5.2)
PROPOXYPH UR QL: NEGATIVE
PROT SERPL-MCNC: 7.8 G/DL (ref 6–8.5)
PROT UR QL STRIP: ABNORMAL
RBC # BLD AUTO: 4 10*6/MM3 (ref 3.77–5.28)
RBC # UR STRIP: ABNORMAL /HPF
REF LAB TEST METHOD: ABNORMAL
SODIUM SERPL-SCNC: 131 MMOL/L (ref 136–145)
SP GR UR STRIP: 1.02 (ref 1–1.03)
SQUAMOUS #/AREA URNS HPF: ABNORMAL /HPF
TRICYCLICS UR QL SCN: NEGATIVE
UROBILINOGEN UR QL STRIP: ABNORMAL
WBC # UR STRIP: ABNORMAL /HPF
WBC NRBC COR # BLD: 5.8 10*3/MM3 (ref 3.4–10.8)
YEAST URNS QL MICRO: ABNORMAL /HPF

## 2022-07-19 PROCEDURE — 81001 URINALYSIS AUTO W/SCOPE: CPT | Performed by: PHYSICIAN ASSISTANT

## 2022-07-19 PROCEDURE — 99284 EMERGENCY DEPT VISIT MOD MDM: CPT

## 2022-07-19 PROCEDURE — 85025 COMPLETE CBC W/AUTO DIFF WBC: CPT | Performed by: PHYSICIAN ASSISTANT

## 2022-07-19 PROCEDURE — 25010000002 PROCHLORPERAZINE 10 MG/2ML SOLUTION: Performed by: NURSE PRACTITIONER

## 2022-07-19 PROCEDURE — 25010000002 CEFTRIAXONE PER 250 MG: Performed by: NURSE PRACTITIONER

## 2022-07-19 PROCEDURE — 25010000002 MORPHINE PER 10 MG: Performed by: NURSE PRACTITIONER

## 2022-07-19 PROCEDURE — 96376 TX/PRO/DX INJ SAME DRUG ADON: CPT

## 2022-07-19 PROCEDURE — 76856 US EXAM PELVIC COMPLETE: CPT

## 2022-07-19 PROCEDURE — 76856 US EXAM PELVIC COMPLETE: CPT | Performed by: RADIOLOGY

## 2022-07-19 PROCEDURE — 87086 URINE CULTURE/COLONY COUNT: CPT | Performed by: PHYSICIAN ASSISTANT

## 2022-07-19 PROCEDURE — 86140 C-REACTIVE PROTEIN: CPT | Performed by: PHYSICIAN ASSISTANT

## 2022-07-19 PROCEDURE — 85652 RBC SED RATE AUTOMATED: CPT | Performed by: PHYSICIAN ASSISTANT

## 2022-07-19 PROCEDURE — 96375 TX/PRO/DX INJ NEW DRUG ADDON: CPT

## 2022-07-19 PROCEDURE — 96361 HYDRATE IV INFUSION ADD-ON: CPT

## 2022-07-19 PROCEDURE — 96365 THER/PROPH/DIAG IV INF INIT: CPT

## 2022-07-19 PROCEDURE — 80053 COMPREHEN METABOLIC PANEL: CPT | Performed by: PHYSICIAN ASSISTANT

## 2022-07-19 PROCEDURE — 80306 DRUG TEST PRSMV INSTRMNT: CPT | Performed by: PHYSICIAN ASSISTANT

## 2022-07-19 RX ORDER — NITROFURANTOIN 25; 75 MG/1; MG/1
100 CAPSULE ORAL 2 TIMES DAILY
Qty: 14 CAPSULE | Refills: 0 | OUTPATIENT
Start: 2022-07-19 | End: 2022-07-23

## 2022-07-19 RX ORDER — PROCHLORPERAZINE EDISYLATE 5 MG/ML
5 INJECTION INTRAMUSCULAR; INTRAVENOUS ONCE
Status: COMPLETED | OUTPATIENT
Start: 2022-07-19 | End: 2022-07-19

## 2022-07-19 RX ORDER — PROMETHAZINE HYDROCHLORIDE 25 MG/1
25 TABLET ORAL ONCE
Status: DISCONTINUED | OUTPATIENT
Start: 2022-07-19 | End: 2022-07-19

## 2022-07-19 RX ADMIN — PROCHLORPERAZINE EDISYLATE 5 MG: 5 INJECTION INTRAMUSCULAR; INTRAVENOUS at 11:49

## 2022-07-19 RX ADMIN — SODIUM CHLORIDE 2 G: 900 INJECTION INTRAVENOUS at 13:52

## 2022-07-19 RX ADMIN — MORPHINE SULFATE 4 MG: 4 INJECTION, SOLUTION INTRAMUSCULAR; INTRAVENOUS at 11:49

## 2022-07-19 RX ADMIN — MORPHINE SULFATE 4 MG: 4 INJECTION, SOLUTION INTRAMUSCULAR; INTRAVENOUS at 13:51

## 2022-07-19 RX ADMIN — SODIUM CHLORIDE 1000 ML: 9 INJECTION, SOLUTION INTRAVENOUS at 11:41

## 2022-07-19 NOTE — ED NOTES
MEDICAL SCREENING:    Reason for Visit: flank pain, nausea, vomiting      Patient initially seen in triage.  The patient was advised further evaluation and diagnostic testing will be needed, some of the treatment and testing will be initiated in the lobby in order to begin the process.  The patient will be returned to the waiting area for the time being and possibly be re-assessed by a subsequent ED provider.  The patient will be brought back to the treatment area in as timely manner as possible.       Dayron Loco PA-C  07/19/22 [id number]

## 2022-07-20 LAB — BACTERIA SPEC AEROBE CULT: NORMAL

## 2022-07-21 DIAGNOSIS — Z79.4 TYPE 2 DIABETES MELLITUS WITH DIABETIC NEUROPATHY, WITH LONG-TERM CURRENT USE OF INSULIN: ICD-10-CM

## 2022-07-21 DIAGNOSIS — M1A.9XX0 CHRONIC GOUT WITHOUT TOPHUS, UNSPECIFIED CAUSE, UNSPECIFIED SITE: Chronic | ICD-10-CM

## 2022-07-21 DIAGNOSIS — E11.40 TYPE 2 DIABETES MELLITUS WITH DIABETIC NEUROPATHY, WITH LONG-TERM CURRENT USE OF INSULIN: ICD-10-CM

## 2022-07-21 DIAGNOSIS — E11.65 TYPE 2 DIABETES MELLITUS WITH HYPERGLYCEMIA, WITH LONG-TERM CURRENT USE OF INSULIN: Chronic | ICD-10-CM

## 2022-07-21 DIAGNOSIS — J30.9 ALLERGIC RHINITIS, UNSPECIFIED SEASONALITY, UNSPECIFIED TRIGGER: ICD-10-CM

## 2022-07-21 DIAGNOSIS — J45.909 MODERATE ASTHMA, UNSPECIFIED WHETHER COMPLICATED, UNSPECIFIED WHETHER PERSISTENT: Chronic | ICD-10-CM

## 2022-07-21 DIAGNOSIS — I10 PRIMARY HYPERTENSION: Chronic | ICD-10-CM

## 2022-07-21 DIAGNOSIS — Z79.4 TYPE 2 DIABETES MELLITUS WITH HYPERGLYCEMIA, WITH LONG-TERM CURRENT USE OF INSULIN: Chronic | ICD-10-CM

## 2022-07-21 RX ORDER — ALBUTEROL SULFATE 90 UG/1
2 AEROSOL, METERED RESPIRATORY (INHALATION) EVERY 4 HOURS PRN
Qty: 18 G | Refills: 0 | Status: SHIPPED | OUTPATIENT
Start: 2022-07-21 | End: 2022-08-24 | Stop reason: SDUPTHER

## 2022-07-21 RX ORDER — SEMAGLUTIDE 1.34 MG/ML
1 INJECTION, SOLUTION SUBCUTANEOUS WEEKLY
Qty: 1 PEN | Refills: 0 | Status: SHIPPED | OUTPATIENT
Start: 2022-07-21 | End: 2022-08-12

## 2022-07-21 RX ORDER — ALLOPURINOL 100 MG/1
100 TABLET ORAL DAILY
Qty: 30 TABLET | Refills: 0 | Status: SHIPPED | OUTPATIENT
Start: 2022-07-21 | End: 2022-08-24 | Stop reason: SDUPTHER

## 2022-07-21 RX ORDER — METOPROLOL SUCCINATE 50 MG/1
50 TABLET, EXTENDED RELEASE ORAL DAILY
Qty: 30 TABLET | Refills: 0 | Status: SHIPPED | OUTPATIENT
Start: 2022-07-21 | End: 2022-08-24 | Stop reason: SDUPTHER

## 2022-07-21 RX ORDER — MONTELUKAST SODIUM 10 MG/1
10 TABLET ORAL NIGHTLY
Qty: 30 TABLET | Refills: 0 | Status: SHIPPED | OUTPATIENT
Start: 2022-07-21 | End: 2022-12-01 | Stop reason: SDUPTHER

## 2022-07-21 RX ORDER — LANCETS 33 GAUGE
1 EACH MISCELLANEOUS 3 TIMES DAILY
Qty: 100 EACH | Refills: 0 | Status: SHIPPED | OUTPATIENT
Start: 2022-07-21 | End: 2022-08-24 | Stop reason: SDUPTHER

## 2022-07-21 RX ORDER — AZELASTINE 1 MG/ML
SPRAY, METERED NASAL
Qty: 1 EACH | Refills: 0 | Status: SHIPPED | OUTPATIENT
Start: 2022-07-21 | End: 2022-08-24 | Stop reason: SDUPTHER

## 2022-07-22 DIAGNOSIS — E55.9 VITAMIN D DEFICIENCY: Primary | ICD-10-CM

## 2022-07-22 DIAGNOSIS — E11.40 TYPE 2 DIABETES MELLITUS WITH DIABETIC NEUROPATHY, WITH LONG-TERM CURRENT USE OF INSULIN: ICD-10-CM

## 2022-07-22 DIAGNOSIS — Z79.4 TYPE 2 DIABETES MELLITUS WITH DIABETIC NEUROPATHY, WITH LONG-TERM CURRENT USE OF INSULIN: ICD-10-CM

## 2022-07-22 RX ORDER — ERGOCALCIFEROL 1.25 MG/1
50000 CAPSULE ORAL WEEKLY
Qty: 5 CAPSULE | Refills: 0 | Status: SHIPPED | OUTPATIENT
Start: 2022-07-22 | End: 2022-10-13 | Stop reason: SDUPTHER

## 2022-07-23 ENCOUNTER — HOSPITAL ENCOUNTER (EMERGENCY)
Facility: HOSPITAL | Age: 37
Discharge: HOME | End: 2022-07-23
Attending: EMERGENCY MEDICINE | Admitting: EMERGENCY MEDICINE

## 2022-07-23 ENCOUNTER — APPOINTMENT (OUTPATIENT)
Dept: CT IMAGING | Facility: HOSPITAL | Age: 37
End: 2022-07-23

## 2022-07-23 VITALS
WEIGHT: 260 LBS | TEMPERATURE: 98.3 F | HEIGHT: 64 IN | RESPIRATION RATE: 16 BRPM | BODY MASS INDEX: 44.39 KG/M2 | HEART RATE: 101 BPM | OXYGEN SATURATION: 98 % | DIASTOLIC BLOOD PRESSURE: 98 MMHG | SYSTOLIC BLOOD PRESSURE: 149 MMHG

## 2022-07-23 DIAGNOSIS — R10.9 FLANK PAIN: Primary | ICD-10-CM

## 2022-07-23 DIAGNOSIS — N12 PYELONEPHRITIS: ICD-10-CM

## 2022-07-23 LAB
ALBUMIN SERPL-MCNC: 4.46 G/DL (ref 3.5–5.2)
ALBUMIN/GLOB SERPL: 1.4 G/DL
ALP SERPL-CCNC: 133 U/L (ref 39–117)
ALT SERPL W P-5'-P-CCNC: 34 U/L (ref 1–33)
AMPHET+METHAMPHET UR QL: NEGATIVE
AMPHETAMINES UR QL: NEGATIVE
ANION GAP SERPL CALCULATED.3IONS-SCNC: 16.6 MMOL/L (ref 5–15)
AST SERPL-CCNC: 63 U/L (ref 1–32)
BACTERIA UR QL AUTO: ABNORMAL /HPF
BARBITURATES UR QL SCN: NEGATIVE
BASOPHILS # BLD AUTO: 0.03 10*3/MM3 (ref 0–0.2)
BASOPHILS NFR BLD AUTO: 0.7 % (ref 0–1.5)
BENZODIAZ UR QL SCN: NEGATIVE
BILIRUB SERPL-MCNC: 0.3 MG/DL (ref 0–1.2)
BILIRUB UR QL STRIP: NEGATIVE
BUN SERPL-MCNC: 13 MG/DL (ref 6–20)
BUN/CREAT SERPL: 18.6 (ref 7–25)
BUPRENORPHINE SERPL-MCNC: NEGATIVE NG/ML
CALCIUM SPEC-SCNC: 9.7 MG/DL (ref 8.6–10.5)
CANNABINOIDS SERPL QL: NEGATIVE
CHLORIDE SERPL-SCNC: 100 MMOL/L (ref 98–107)
CLARITY UR: ABNORMAL
CO2 SERPL-SCNC: 18.4 MMOL/L (ref 22–29)
COCAINE UR QL: NEGATIVE
COLOR UR: YELLOW
CREAT SERPL-MCNC: 0.7 MG/DL (ref 0.57–1)
DEPRECATED RDW RBC AUTO: 43.3 FL (ref 37–54)
EGFRCR SERPLBLD CKD-EPI 2021: 115.1 ML/MIN/1.73
EOSINOPHIL # BLD AUTO: 0.12 10*3/MM3 (ref 0–0.4)
EOSINOPHIL NFR BLD AUTO: 2.8 % (ref 0.3–6.2)
ERYTHROCYTE [DISTWIDTH] IN BLOOD BY AUTOMATED COUNT: 12.9 % (ref 12.3–15.4)
GLOBULIN UR ELPH-MCNC: 3.1 GM/DL
GLUCOSE SERPL-MCNC: 314 MG/DL (ref 65–99)
GLUCOSE UR STRIP-MCNC: ABNORMAL MG/DL
HCG SERPL QL: NEGATIVE
HCT VFR BLD AUTO: 35.4 % (ref 34–46.6)
HGB BLD-MCNC: 12 G/DL (ref 12–15.9)
HGB UR QL STRIP.AUTO: ABNORMAL
HOLD SPECIMEN: NORMAL
HYALINE CASTS UR QL AUTO: ABNORMAL /LPF
IMM GRANULOCYTES # BLD AUTO: 0.04 10*3/MM3 (ref 0–0.05)
IMM GRANULOCYTES NFR BLD AUTO: 0.9 % (ref 0–0.5)
KETONES UR QL STRIP: ABNORMAL
LEUKOCYTE ESTERASE UR QL STRIP.AUTO: ABNORMAL
LIPASE SERPL-CCNC: 39 U/L (ref 13–60)
LYMPHOCYTES # BLD AUTO: 1.1 10*3/MM3 (ref 0.7–3.1)
LYMPHOCYTES NFR BLD AUTO: 25.9 % (ref 19.6–45.3)
MCH RBC QN AUTO: 31.3 PG (ref 26.6–33)
MCHC RBC AUTO-ENTMCNC: 33.9 G/DL (ref 31.5–35.7)
MCV RBC AUTO: 92.4 FL (ref 79–97)
METHADONE UR QL SCN: NEGATIVE
MONOCYTES # BLD AUTO: 0.42 10*3/MM3 (ref 0.1–0.9)
MONOCYTES NFR BLD AUTO: 9.9 % (ref 5–12)
NEUTROPHILS NFR BLD AUTO: 2.53 10*3/MM3 (ref 1.7–7)
NEUTROPHILS NFR BLD AUTO: 59.8 % (ref 42.7–76)
NITRITE UR QL STRIP: NEGATIVE
NRBC BLD AUTO-RTO: 0 /100 WBC (ref 0–0.2)
OPIATES UR QL: NEGATIVE
OXYCODONE UR QL SCN: POSITIVE
PCP UR QL SCN: NEGATIVE
PH UR STRIP.AUTO: 5.5 [PH] (ref 5–8)
PLATELET # BLD AUTO: 226 10*3/MM3 (ref 140–450)
PMV BLD AUTO: 9 FL (ref 6–12)
POTASSIUM SERPL-SCNC: 4.3 MMOL/L (ref 3.5–5.2)
PROPOXYPH UR QL: NEGATIVE
PROT SERPL-MCNC: 7.6 G/DL (ref 6–8.5)
PROT UR QL STRIP: ABNORMAL
RBC # BLD AUTO: 3.83 10*6/MM3 (ref 3.77–5.28)
RBC # UR STRIP: ABNORMAL /HPF
REF LAB TEST METHOD: ABNORMAL
SODIUM SERPL-SCNC: 135 MMOL/L (ref 136–145)
SP GR UR STRIP: 1.02 (ref 1–1.03)
SQUAMOUS #/AREA URNS HPF: ABNORMAL /HPF
TRICYCLICS UR QL SCN: NEGATIVE
UROBILINOGEN UR QL STRIP: ABNORMAL
WBC # UR STRIP: ABNORMAL /HPF
WBC NRBC COR # BLD: 4.24 10*3/MM3 (ref 3.4–10.8)
WHOLE BLOOD HOLD COAG: NORMAL
WHOLE BLOOD HOLD SPECIMEN: NORMAL

## 2022-07-23 PROCEDURE — 96374 THER/PROPH/DIAG INJ IV PUSH: CPT

## 2022-07-23 PROCEDURE — 96375 TX/PRO/DX INJ NEW DRUG ADDON: CPT

## 2022-07-23 PROCEDURE — 84703 CHORIONIC GONADOTROPIN ASSAY: CPT | Performed by: EMERGENCY MEDICINE

## 2022-07-23 PROCEDURE — 96361 HYDRATE IV INFUSION ADD-ON: CPT

## 2022-07-23 PROCEDURE — 80306 DRUG TEST PRSMV INSTRMNT: CPT | Performed by: EMERGENCY MEDICINE

## 2022-07-23 PROCEDURE — 81001 URINALYSIS AUTO W/SCOPE: CPT | Performed by: EMERGENCY MEDICINE

## 2022-07-23 PROCEDURE — 74176 CT ABD & PELVIS W/O CONTRAST: CPT | Performed by: RADIOLOGY

## 2022-07-23 PROCEDURE — 25010000002 PROCHLORPERAZINE 10 MG/2ML SOLUTION: Performed by: EMERGENCY MEDICINE

## 2022-07-23 PROCEDURE — 80053 COMPREHEN METABOLIC PANEL: CPT | Performed by: EMERGENCY MEDICINE

## 2022-07-23 PROCEDURE — 87086 URINE CULTURE/COLONY COUNT: CPT | Performed by: EMERGENCY MEDICINE

## 2022-07-23 PROCEDURE — 99284 EMERGENCY DEPT VISIT MOD MDM: CPT

## 2022-07-23 PROCEDURE — 25010000002 MORPHINE PER 10 MG: Performed by: EMERGENCY MEDICINE

## 2022-07-23 PROCEDURE — 74176 CT ABD & PELVIS W/O CONTRAST: CPT

## 2022-07-23 PROCEDURE — 83690 ASSAY OF LIPASE: CPT | Performed by: EMERGENCY MEDICINE

## 2022-07-23 PROCEDURE — 96376 TX/PRO/DX INJ SAME DRUG ADON: CPT

## 2022-07-23 PROCEDURE — 85025 COMPLETE CBC W/AUTO DIFF WBC: CPT | Performed by: EMERGENCY MEDICINE

## 2022-07-23 PROCEDURE — P9612 CATHETERIZE FOR URINE SPEC: HCPCS

## 2022-07-23 RX ORDER — HYDROCODONE BITARTRATE AND ACETAMINOPHEN 5; 325 MG/1; MG/1
1 TABLET ORAL EVERY 6 HOURS PRN
Qty: 8 TABLET | Refills: 0 | Status: SHIPPED | OUTPATIENT
Start: 2022-07-23 | End: 2022-08-24

## 2022-07-23 RX ORDER — MORPHINE SULFATE 2 MG/ML
2 INJECTION, SOLUTION INTRAMUSCULAR; INTRAVENOUS ONCE
Status: COMPLETED | OUTPATIENT
Start: 2022-07-23 | End: 2022-07-23

## 2022-07-23 RX ORDER — CEFDINIR 300 MG/1
300 CAPSULE ORAL ONCE
Status: DISCONTINUED | OUTPATIENT
Start: 2022-07-23 | End: 2022-07-23

## 2022-07-23 RX ORDER — SODIUM CHLORIDE 0.9 % (FLUSH) 0.9 %
10 SYRINGE (ML) INJECTION AS NEEDED
Status: DISCONTINUED | OUTPATIENT
Start: 2022-07-23 | End: 2022-07-23 | Stop reason: HOSPADM

## 2022-07-23 RX ORDER — PROCHLORPERAZINE EDISYLATE 5 MG/ML
5 INJECTION INTRAMUSCULAR; INTRAVENOUS ONCE
Status: COMPLETED | OUTPATIENT
Start: 2022-07-23 | End: 2022-07-23

## 2022-07-23 RX ORDER — CIPROFLOXACIN 500 MG/1
500 TABLET, FILM COATED ORAL 2 TIMES DAILY
Qty: 14 TABLET | Refills: 0 | Status: SHIPPED | OUTPATIENT
Start: 2022-07-23 | End: 2022-07-30

## 2022-07-23 RX ORDER — CIPROFLOXACIN 500 MG/1
500 TABLET, FILM COATED ORAL ONCE
Status: COMPLETED | OUTPATIENT
Start: 2022-07-23 | End: 2022-07-23

## 2022-07-23 RX ORDER — SODIUM CHLORIDE 9 MG/ML
125 INJECTION, SOLUTION INTRAVENOUS CONTINUOUS
Status: DISCONTINUED | OUTPATIENT
Start: 2022-07-23 | End: 2022-07-23 | Stop reason: HOSPADM

## 2022-07-23 RX ORDER — NITROFURANTOIN 25; 75 MG/1; MG/1
100 CAPSULE ORAL ONCE
Status: DISCONTINUED | OUTPATIENT
Start: 2022-07-23 | End: 2022-07-23

## 2022-07-23 RX ADMIN — CIPROFLOXACIN 500 MG: 500 TABLET, FILM COATED ORAL at 13:23

## 2022-07-23 RX ADMIN — SODIUM CHLORIDE 500 ML: 9 INJECTION, SOLUTION INTRAVENOUS at 10:55

## 2022-07-23 RX ADMIN — MORPHINE SULFATE 2 MG: 2 INJECTION, SOLUTION INTRAMUSCULAR; INTRAVENOUS at 13:23

## 2022-07-23 RX ADMIN — PROCHLORPERAZINE EDISYLATE 5 MG: 5 INJECTION INTRAMUSCULAR; INTRAVENOUS at 10:52

## 2022-07-23 RX ADMIN — SODIUM CHLORIDE 125 ML/HR: 9 INJECTION, SOLUTION INTRAVENOUS at 10:51

## 2022-07-23 RX ADMIN — MORPHINE SULFATE 2 MG: 2 INJECTION, SOLUTION INTRAMUSCULAR; INTRAVENOUS at 10:54

## 2022-07-23 RX ADMIN — MORPHINE SULFATE 2 MG: 2 INJECTION, SOLUTION INTRAMUSCULAR; INTRAVENOUS at 12:01

## 2022-07-23 NOTE — ED NOTES
Discharge instructions reviewed with patient, patient instructed to return to ED if symptoms worsen or if any new problems arise. Patient verbalizes understanding of discharge instructions, patient transported out of ED in wheelchair. No acute distress noted.

## 2022-07-23 NOTE — DISCHARGE INSTRUCTIONS
Home to rest.  Drink plenty of water.  Take your medication as prescribed.  See your family doctor in the office on Monday.  Call your urologist office Monday morning to schedule his first available appointment.  Return to the emergency department right away if symptoms worsen/any problems.

## 2022-07-23 NOTE — ED PROVIDER NOTES
Subjective   Patient is 37-year-old female with an extensive past medical history.  She presents today complaining of the onset yesterday of left flank pain, some nausea, no vomiting.  No dysuria or hematuria.  No fever, chills, chest pain, shortness of breath, other symptoms or other complaints.  She has a history of kidney stones, states that this feels just like previous kidney stones.          Review of Systems   All other systems reviewed and are negative.      Past Medical History:   Diagnosis Date   • A-fib (HCC)    • Abnormal ECG    • Anemia    • Anxiety    • Asthma    • Cancer (HCC)     Ovarian   • Depression    • Diabetes mellitus (HCC)    • DVT (deep venous thrombosis) (HCC)    • Factor 5 Leiden mutation, heterozygous (HCC)    • Fibroid    • GERD (gastroesophageal reflux disease)    • Gout    • H/O abdominal abscess    • History of sepsis    • History of transfusion    • Hyperlipidemia    • Hypothyroid    • Kidney stone    • Migraines    • Neuropathy    • Ovarian cancer (HCC) 2021   • Ovarian cyst    • PE (pulmonary embolism)    • Polycystic ovary syndrome    • Preeclampsia    • Rh incompatibility    • Stroke (HCC)    • TIA (transient ischemic attack)    • Urinary tract infection    • Varicella        Allergies   Allergen Reactions   • Toradol [Ketorolac Tromethamine] Anaphylaxis and Hives   • Haldol [Haloperidol] Hives and Mental Status Change   • Tramadol Hives and Swelling   • Amoxicillin Hives and Rash   • Penicillins Hives and Rash       Past Surgical History:   Procedure Laterality Date   • ABDOMINAL SURGERY     • CARDIAC CATHETERIZATION     •  SECTION     • CHOLECYSTECTOMY     • COLONOSCOPY     • ENDOSCOPY     • EXTRACORPOREAL SHOCK WAVE LITHOTRIPSY (ESWL) Left 10/22/2021    Procedure: EXTRACORPOREAL SHOCKWAVE LITHOTRIPSY;  Surgeon: Milan Motley MD;  Location: [facility];  Service: Urology;  Laterality: Left;   • LITHOTRIPSY     • RIGHT OOPHORECTOMY     • URETEROSCOPY LASER  "LITHOTRIPSY WITH STENT INSERTION Left 10/01/2021    Procedure: URETEROSCOPY WITH STENT PLACEMENT;  Surgeon: Milan Motley MD;  Location: [facility] OR;  Service: Urology;  Laterality: Left;   • URETEROSCOPY LASER LITHOTRIPSY WITH STENT INSERTION Left 4/27/2022    Procedure: URETEROSCOPY STENT REMOVAL;  Surgeon: Milan Motley MD;  Location: [facility] OR;  Service: Urology;  Laterality: Left;   • URETEROSCOPY LASER LITHOTRIPSY WITH STENT INSERTION Left 6/29/2022    Procedure: CYSTOSCOPY RETROGRADE LEFT WITH STENT PLACEMENT;  Surgeon: Milan Motley MD;  Location: [facility] OR;  Service: Urology;  Laterality: Left;       Family History   Problem Relation Age of Onset   • Hypertension Mother    • Diabetes Father    • Hypertension Father    • Heart attack Father    • No Known Problems Sister    • No Known Problems Brother    • No Known Problems Son    • No Known Problems Daughter    • No Known Problems Maternal Grandmother    • No Known Problems Maternal Grandfather    • No Known Problems Paternal Grandmother    • No Known Problems Paternal Grandfather    • No Known Problems Cousin    • Rheum arthritis Neg Hx    • Osteoarthritis Neg Hx    • Asthma Neg Hx    • Heart failure Neg Hx    • Hyperlipidemia Neg Hx    • Migraines Neg Hx    • Rashes / Skin problems Neg Hx    • Seizures Neg Hx    • Stroke Neg Hx    • Thyroid disease Neg Hx        Social History     Socioeconomic History   • Marital status:    Tobacco Use   • Smoking status: Never Smoker   • Smokeless tobacco: Never Used   Vaping Use   • Vaping Use: Never used   Substance and Sexual Activity   • Alcohol use: No   • Drug use: Never     Comment: Pt has track marks on right arm. Pt states \"It's from my INR being drawn.\"    • Sexual activity: Not Currently     Partners: Male     Birth control/protection: None           Objective   Physical Exam  Vitals and nursing note reviewed.   Constitutional:       General: She is not in acute distress.    "  Appearance: She is well-developed. She is obese. She is not toxic-appearing or diaphoretic.      Comments: Obese female, alert and well-oriented, in no apparent acute distress.   HENT:      Head: Normocephalic and atraumatic.   Eyes:      General: No scleral icterus.     Pupils: Pupils are equal, round, and reactive to light.   Neck:      Trachea: No tracheal deviation.   Cardiovascular:      Rate and Rhythm: Normal rate and regular rhythm.   Pulmonary:      Effort: Pulmonary effort is normal. No respiratory distress.      Breath sounds: Normal breath sounds.   Chest:      Chest wall: No tenderness.   Abdominal:      General: Bowel sounds are normal.      Palpations: Abdomen is soft.      Tenderness: There is abdominal tenderness (Minimal tenderness in the left flank.  No other tenderness.  No costovertebral angle tenderness.  No acute peritoneal signs.). There is no right CVA tenderness, left CVA tenderness, guarding or rebound.   Musculoskeletal:         General: No tenderness. Normal range of motion.      Cervical back: Normal range of motion and neck supple. No rigidity or tenderness.      Right lower leg: No edema.      Left lower leg: No edema.   Skin:     General: Skin is warm and dry.      Capillary Refill: Capillary refill takes less than 2 seconds.      Coloration: Skin is not pale.   Neurological:      General: No focal deficit present.      Mental Status: She is alert and oriented to person, place, and time.      GCS: GCS eye subscore is 4. GCS verbal subscore is 5. GCS motor subscore is 6.      Motor: No abnormal muscle tone.   Psychiatric:         Mood and Affect: Mood normal.         Behavior: Behavior normal.         Procedures  CT Abdomen Pelvis Without Contrast   Final Result       1. Hepatosplenomegaly       2.Fatty infiltration of the liver       Other findings as above                           This report was finalized on 7/23/2022 11:34 AM by Dr. Ajay De Guzman MD.            Results for  orders placed or performed during the hospital encounter of 07/23/22   Urinalysis With Culture If Indicated - Urine, Catheter In/Out    Specimen: Urine, Catheter In/Out   Result Value Ref Range    Color, UA Yellow Yellow, Straw    Appearance, UA Turbid (A) Clear    pH, UA 5.5 5.0 - 8.0    Specific Gravity, UA 1.019 1.005 - 1.030    Glucose, UA >=1000 mg/dL (3+) (A) Negative    Ketones, UA Trace (A) Negative    Bilirubin, UA Negative Negative    Blood, UA Small (1+) (A) Negative    Protein,  mg/dL (2+) (A) Negative    Leuk Esterase, UA Large (3+) (A) Negative    Nitrite, UA Negative Negative    Urobilinogen, UA 0.2 E.U./dL 0.2 - 1.0 E.U./dL   Comprehensive Metabolic Panel    Specimen: Arm, Right; Blood   Result Value Ref Range    Glucose 314 (H) 65 - 99 mg/dL    BUN 13 6 - 20 mg/dL    Creatinine 0.70 0.57 - 1.00 mg/dL    Sodium 135 (L) 136 - 145 mmol/L    Potassium 4.3 3.5 - 5.2 mmol/L    Chloride 100 98 - 107 mmol/L    CO2 18.4 (L) 22.0 - 29.0 mmol/L    Calcium 9.7 8.6 - 10.5 mg/dL    Total Protein 7.6 6.0 - 8.5 g/dL    Albumin 4.46 3.50 - 5.20 g/dL    ALT (SGPT) 34 (H) 1 - 33 U/L    AST (SGOT) 63 (H) 1 - 32 U/L    Alkaline Phosphatase 133 (H) 39 - 117 U/L    Total Bilirubin 0.3 0.0 - 1.2 mg/dL    Globulin 3.1 gm/dL    A/G Ratio 1.4 g/dL    BUN/Creatinine Ratio 18.6 7.0 - 25.0    Anion Gap 16.6 (H) 5.0 - 15.0 mmol/L    eGFR 115.1 >60.0 mL/min/1.73   Lipase    Specimen: Arm, Right; Blood   Result Value Ref Range    Lipase 39 13 - 60 U/L   hCG, Serum, Qualitative    Specimen: Arm, Right; Blood   Result Value Ref Range    HCG Qualitative Negative Negative   Urine Drug Screen - Urine, Catheter In/Out    Specimen: Urine, Catheter In/Out   Result Value Ref Range    THC, Screen, Urine Negative Negative    Phencyclidine (PCP), Urine Negative Negative    Cocaine Screen, Urine Negative Negative    Methamphetamine, Ur Negative Negative    Opiate Screen Negative Negative    Amphetamine Screen, Urine Negative Negative     Benzodiazepine Screen, Urine Negative Negative    Tricyclic Antidepressants Screen Negative Negative    Methadone Screen, Urine Negative Negative    Barbiturates Screen, Urine Negative Negative    Oxycodone Screen, Urine Positive (A) Negative    Propoxyphene Screen Negative Negative    Buprenorphine, Screen, Urine Negative Negative   CBC Auto Differential    Specimen: Arm, Right; Blood   Result Value Ref Range    WBC 4.24 3.40 - 10.80 10*3/mm3    RBC 3.83 3.77 - 5.28 10*6/mm3    Hemoglobin 12.0 12.0 - 15.9 g/dL    Hematocrit 35.4 34.0 - 46.6 %    MCV 92.4 79.0 - 97.0 fL    MCH 31.3 26.6 - 33.0 pg    MCHC 33.9 31.5 - 35.7 g/dL    RDW 12.9 12.3 - 15.4 %    RDW-SD 43.3 37.0 - 54.0 fl    MPV 9.0 6.0 - 12.0 fL    Platelets 226 140 - 450 10*3/mm3    Neutrophil % 59.8 42.7 - 76.0 %    Lymphocyte % 25.9 19.6 - 45.3 %    Monocyte % 9.9 5.0 - 12.0 %    Eosinophil % 2.8 0.3 - 6.2 %    Basophil % 0.7 0.0 - 1.5 %    Immature Grans % 0.9 (H) 0.0 - 0.5 %    Neutrophils, Absolute 2.53 1.70 - 7.00 10*3/mm3    Lymphocytes, Absolute 1.10 0.70 - 3.10 10*3/mm3    Monocytes, Absolute 0.42 0.10 - 0.90 10*3/mm3    Eosinophils, Absolute 0.12 0.00 - 0.40 10*3/mm3    Basophils, Absolute 0.03 0.00 - 0.20 10*3/mm3    Immature Grans, Absolute 0.04 0.00 - 0.05 10*3/mm3    nRBC 0.0 0.0 - 0.2 /100 WBC   Urinalysis, Microscopic Only - Urine, Catheter In/Out    Specimen: Urine, Catheter In/Out   Result Value Ref Range    RBC, UA 6-12 (A) None Seen, 0-2 /HPF    WBC, UA Too Numerous to Count (A) None Seen, 0-2 /HPF    Bacteria, UA Trace (A) None Seen /HPF    Squamous Epithelial Cells, UA 0-2 None Seen, 0-2 /HPF    Hyaline Casts, UA None Seen None Seen /LPF    Methodology Manual Light Microscopy    Green Top (Gel)   Result Value Ref Range    Extra Tube Hold for add-ons.    Lavender Top   Result Value Ref Range    Extra Tube hold for add-on    Light Blue Top   Result Value Ref Range    Extra Tube Hold for add-ons.                 ED Course  ED Course as  of 07/23/22 1335   Sat Jul 23, 2022   1334 Patient's emergency department stay has been uneventful.  She has shown no signs of acute distress. [CM]      ED Course User Index  [CM] Esvin Narayanan MD                                           [facility]    Final diagnoses:   Flank pain       ED Disposition  ED Disposition     ED Disposition   Discharge    Condition   Stable    Comment   --             Eddie Latif, APRN  [address] [facility] [address]  [phone]    Go in 2 days  Monday    Milan Motley MD  [address] [facility]  [address]  [facility] [id number] [phone]    Go to   Call Monday morning for first available appointment    [facility] Emergency Department  [address]  [facility] [address] [phone]  Go to   If symptoms worsen         Medication List      New Prescriptions    ciprofloxacin 500 MG tablet  Commonly known as: CIPRO  Take 1 tablet by mouth 2 (Two) Times a Day for 7 days.        Changed    HYDROcodone-acetaminophen 5-325 MG per tablet  Commonly known as: NORCO  Take 1 tablet by mouth Every 6 (Six) Hours As Needed (pain).  What changed:   · reasons to take this  · Another medication with the same name was removed. Continue taking this medication, and follow the directions you see here.        Stop    nitrofurantoin (macrocrystal-monohydrate) 100 MG capsule  Commonly known as: MACROBID     oxyCODONE-acetaminophen  MG per tablet  Commonly known as: Percocet           Where to Get Your Medications      These medications were sent to [address] [phone] [facility] [phone] [address] [address]    Phone: [phone]   · ciprofloxacin 500 MG tablet  · HYDROcodone-acetaminophen 5-325 MG per tablet       Please note that portions of this note were completed with a voice recognition program.        Esvin Narayanan MD  07/23/22 1334

## 2022-07-24 LAB — BACTERIA SPEC AEROBE CULT: NORMAL

## 2022-07-25 DIAGNOSIS — Z79.4 TYPE 2 DIABETES MELLITUS WITH DIABETIC NEUROPATHY, WITH LONG-TERM CURRENT USE OF INSULIN: ICD-10-CM

## 2022-07-25 DIAGNOSIS — E11.40 TYPE 2 DIABETES MELLITUS WITH DIABETIC NEUROPATHY, WITH LONG-TERM CURRENT USE OF INSULIN: ICD-10-CM

## 2022-07-25 RX ORDER — GABAPENTIN 300 MG/1
300 CAPSULE ORAL 2 TIMES DAILY
Qty: 60 CAPSULE | Refills: 0 | Status: SHIPPED | OUTPATIENT
Start: 2022-07-25 | End: 2022-08-31 | Stop reason: SDUPTHER

## 2022-07-27 ENCOUNTER — HOSPITAL ENCOUNTER (EMERGENCY)
Facility: HOSPITAL | Age: 37
Discharge: HOME | End: 2022-07-27
Attending: EMERGENCY MEDICINE | Admitting: EMERGENCY MEDICINE

## 2022-07-27 VITALS
RESPIRATION RATE: 18 BRPM | HEART RATE: 98 BPM | HEIGHT: 64 IN | DIASTOLIC BLOOD PRESSURE: 82 MMHG | WEIGHT: 270 LBS | SYSTOLIC BLOOD PRESSURE: 119 MMHG | OXYGEN SATURATION: 97 % | TEMPERATURE: 98.6 F | BODY MASS INDEX: 46.1 KG/M2

## 2022-07-27 DIAGNOSIS — N39.0 ACUTE UTI: Primary | ICD-10-CM

## 2022-07-27 DIAGNOSIS — R10.9 FLANK PAIN: ICD-10-CM

## 2022-07-27 LAB
ALBUMIN SERPL-MCNC: 4.64 G/DL (ref 3.5–5.2)
ALBUMIN/GLOB SERPL: 1.4 G/DL
ALP SERPL-CCNC: 136 U/L (ref 39–117)
ALT SERPL W P-5'-P-CCNC: 37 U/L (ref 1–33)
ANION GAP SERPL CALCULATED.3IONS-SCNC: 18.8 MMOL/L (ref 5–15)
AST SERPL-CCNC: 66 U/L (ref 1–32)
BACTERIA UR QL AUTO: ABNORMAL /HPF
BASOPHILS # BLD AUTO: 0.04 10*3/MM3 (ref 0–0.2)
BASOPHILS NFR BLD AUTO: 0.7 % (ref 0–1.5)
BILIRUB SERPL-MCNC: 0.4 MG/DL (ref 0–1.2)
BILIRUB UR QL STRIP: NEGATIVE
BUN SERPL-MCNC: 13 MG/DL (ref 6–20)
BUN/CREAT SERPL: 17.6 (ref 7–25)
CALCIUM SPEC-SCNC: 10 MG/DL (ref 8.6–10.5)
CHLORIDE SERPL-SCNC: 97 MMOL/L (ref 98–107)
CLARITY UR: ABNORMAL
CO2 SERPL-SCNC: 18.2 MMOL/L (ref 22–29)
COLOR UR: YELLOW
CREAT SERPL-MCNC: 0.74 MG/DL (ref 0.57–1)
DEPRECATED RDW RBC AUTO: 41.8 FL (ref 37–54)
EGFRCR SERPLBLD CKD-EPI 2021: 107.7 ML/MIN/1.73
EOSINOPHIL # BLD AUTO: 0.14 10*3/MM3 (ref 0–0.4)
EOSINOPHIL NFR BLD AUTO: 2.6 % (ref 0.3–6.2)
ERYTHROCYTE [DISTWIDTH] IN BLOOD BY AUTOMATED COUNT: 12.8 % (ref 12.3–15.4)
GLOBULIN UR ELPH-MCNC: 3.3 GM/DL
GLUCOSE SERPL-MCNC: 315 MG/DL (ref 65–99)
GLUCOSE UR STRIP-MCNC: ABNORMAL MG/DL
HCT VFR BLD AUTO: 37.3 % (ref 34–46.6)
HGB BLD-MCNC: 12.6 G/DL (ref 12–15.9)
HGB UR QL STRIP.AUTO: ABNORMAL
HYALINE CASTS UR QL AUTO: ABNORMAL /LPF
IMM GRANULOCYTES # BLD AUTO: 0.04 10*3/MM3 (ref 0–0.05)
IMM GRANULOCYTES NFR BLD AUTO: 0.7 % (ref 0–0.5)
KETONES UR QL STRIP: ABNORMAL
LEUKOCYTE ESTERASE UR QL STRIP.AUTO: ABNORMAL
LYMPHOCYTES # BLD AUTO: 1.16 10*3/MM3 (ref 0.7–3.1)
LYMPHOCYTES NFR BLD AUTO: 21.1 % (ref 19.6–45.3)
MCH RBC QN AUTO: 30.9 PG (ref 26.6–33)
MCHC RBC AUTO-ENTMCNC: 33.8 G/DL (ref 31.5–35.7)
MCV RBC AUTO: 91.4 FL (ref 79–97)
MONOCYTES # BLD AUTO: 0.46 10*3/MM3 (ref 0.1–0.9)
MONOCYTES NFR BLD AUTO: 8.4 % (ref 5–12)
NEUTROPHILS NFR BLD AUTO: 3.65 10*3/MM3 (ref 1.7–7)
NEUTROPHILS NFR BLD AUTO: 66.5 % (ref 42.7–76)
NITRITE UR QL STRIP: NEGATIVE
NRBC BLD AUTO-RTO: 0 /100 WBC (ref 0–0.2)
PH UR STRIP.AUTO: 5.5 [PH] (ref 5–8)
PLATELET # BLD AUTO: 228 10*3/MM3 (ref 140–450)
PMV BLD AUTO: 9 FL (ref 6–12)
POTASSIUM SERPL-SCNC: 4.3 MMOL/L (ref 3.5–5.2)
PROT SERPL-MCNC: 7.9 G/DL (ref 6–8.5)
PROT UR QL STRIP: ABNORMAL
RBC # BLD AUTO: 4.08 10*6/MM3 (ref 3.77–5.28)
RBC # UR STRIP: ABNORMAL /HPF
REF LAB TEST METHOD: ABNORMAL
SODIUM SERPL-SCNC: 134 MMOL/L (ref 136–145)
SP GR UR STRIP: 1.02 (ref 1–1.03)
SQUAMOUS #/AREA URNS HPF: ABNORMAL /HPF
UROBILINOGEN UR QL STRIP: ABNORMAL
WBC # UR STRIP: ABNORMAL /HPF
WBC CLUMPS # UR AUTO: ABNORMAL /HPF
WBC NRBC COR # BLD: 5.49 10*3/MM3 (ref 3.4–10.8)
YEAST URNS QL MICRO: ABNORMAL /HPF

## 2022-07-27 PROCEDURE — 85025 COMPLETE CBC W/AUTO DIFF WBC: CPT | Performed by: NURSE PRACTITIONER

## 2022-07-27 PROCEDURE — 99283 EMERGENCY DEPT VISIT LOW MDM: CPT

## 2022-07-27 PROCEDURE — 36415 COLL VENOUS BLD VENIPUNCTURE: CPT

## 2022-07-27 PROCEDURE — 96374 THER/PROPH/DIAG INJ IV PUSH: CPT

## 2022-07-27 PROCEDURE — 96376 TX/PRO/DX INJ SAME DRUG ADON: CPT

## 2022-07-27 PROCEDURE — 81001 URINALYSIS AUTO W/SCOPE: CPT | Performed by: NURSE PRACTITIONER

## 2022-07-27 PROCEDURE — 25010000002 ONDANSETRON PER 1 MG: Performed by: NURSE PRACTITIONER

## 2022-07-27 PROCEDURE — 25010000002 MORPHINE PER 10 MG: Performed by: NURSE PRACTITIONER

## 2022-07-27 PROCEDURE — 96361 HYDRATE IV INFUSION ADD-ON: CPT

## 2022-07-27 PROCEDURE — 80053 COMPREHEN METABOLIC PANEL: CPT | Performed by: NURSE PRACTITIONER

## 2022-07-27 PROCEDURE — 96375 TX/PRO/DX INJ NEW DRUG ADDON: CPT

## 2022-07-27 PROCEDURE — 87086 URINE CULTURE/COLONY COUNT: CPT | Performed by: NURSE PRACTITIONER

## 2022-07-27 RX ORDER — NITROFURANTOIN 25; 75 MG/1; MG/1
100 CAPSULE ORAL 2 TIMES DAILY
Qty: 14 CAPSULE | Refills: 0 | OUTPATIENT
Start: 2022-07-27 | End: 2022-07-29

## 2022-07-27 RX ORDER — SODIUM CHLORIDE 0.9 % (FLUSH) 0.9 %
10 SYRINGE (ML) INJECTION AS NEEDED
Status: DISCONTINUED | OUTPATIENT
Start: 2022-07-27 | End: 2022-07-27 | Stop reason: HOSPADM

## 2022-07-27 RX ORDER — ONDANSETRON 2 MG/ML
4 INJECTION INTRAMUSCULAR; INTRAVENOUS ONCE
Status: COMPLETED | OUTPATIENT
Start: 2022-07-27 | End: 2022-07-27

## 2022-07-27 RX ORDER — ONDANSETRON 4 MG/1
4 TABLET, ORALLY DISINTEGRATING ORAL EVERY 6 HOURS PRN
Qty: 12 TABLET | Refills: 0 | Status: SHIPPED | OUTPATIENT
Start: 2022-07-27 | End: 2022-12-28 | Stop reason: SDUPTHER

## 2022-07-27 RX ORDER — HYDROCODONE BITARTRATE AND ACETAMINOPHEN 5; 325 MG/1; MG/1
1 TABLET ORAL EVERY 6 HOURS PRN
Qty: 10 TABLET | Refills: 0 | Status: SHIPPED | OUTPATIENT
Start: 2022-07-27 | End: 2022-08-24

## 2022-07-27 RX ADMIN — SODIUM CHLORIDE 1000 ML: 9 INJECTION, SOLUTION INTRAVENOUS at 11:55

## 2022-07-27 RX ADMIN — ONDANSETRON 4 MG: 2 INJECTION INTRAMUSCULAR; INTRAVENOUS at 11:55

## 2022-07-27 RX ADMIN — MORPHINE SULFATE 4 MG: 4 INJECTION, SOLUTION INTRAMUSCULAR; INTRAVENOUS at 14:24

## 2022-07-27 RX ADMIN — MORPHINE SULFATE 4 MG: 4 INJECTION, SOLUTION INTRAMUSCULAR; INTRAVENOUS at 11:55

## 2022-07-28 LAB — BACTERIA SPEC AEROBE CULT: NORMAL

## 2022-07-29 ENCOUNTER — HOSPITAL ENCOUNTER (EMERGENCY)
Facility: HOSPITAL | Age: 37
Discharge: HOME | End: 2022-07-29
Attending: STUDENT IN AN ORGANIZED HEALTH CARE EDUCATION/TRAINING PROGRAM | Admitting: STUDENT IN AN ORGANIZED HEALTH CARE EDUCATION/TRAINING PROGRAM

## 2022-07-29 VITALS
HEIGHT: 61 IN | SYSTOLIC BLOOD PRESSURE: 158 MMHG | HEART RATE: 99 BPM | DIASTOLIC BLOOD PRESSURE: 100 MMHG | WEIGHT: 270 LBS | TEMPERATURE: 97.8 F | BODY MASS INDEX: 50.98 KG/M2 | OXYGEN SATURATION: 96 % | RESPIRATION RATE: 20 BRPM

## 2022-07-29 DIAGNOSIS — N39.0 URINARY TRACT INFECTION IN FEMALE: Primary | ICD-10-CM

## 2022-07-29 LAB
A-A DO2: ABNORMAL
ACETONE BLD QL: NEGATIVE
ALBUMIN SERPL-MCNC: 4.44 G/DL (ref 3.5–5.2)
ALBUMIN/GLOB SERPL: 1.4 G/DL
ALP SERPL-CCNC: 138 U/L (ref 39–117)
ALT SERPL W P-5'-P-CCNC: 35 U/L (ref 1–33)
ANION GAP SERPL CALCULATED.3IONS-SCNC: 19.8 MMOL/L (ref 5–15)
ARTERIAL PATENCY WRIST A: POSITIVE
AST SERPL-CCNC: 53 U/L (ref 1–32)
ATMOSPHERIC PRESS: 729 MMHG
BACTERIA UR QL AUTO: ABNORMAL /HPF
BASE EXCESS BLDA CALC-SCNC: -3.2 MMOL/L (ref 0–2)
BASOPHILS # BLD AUTO: 0.04 10*3/MM3 (ref 0–0.2)
BASOPHILS NFR BLD AUTO: 0.7 % (ref 0–1.5)
BDY SITE: ABNORMAL
BILIRUB SERPL-MCNC: 0.3 MG/DL (ref 0–1.2)
BILIRUB UR QL STRIP: NEGATIVE
BODY TEMPERATURE: 0 C
BUN SERPL-MCNC: 13 MG/DL (ref 6–20)
BUN/CREAT SERPL: 18.3 (ref 7–25)
CALCIUM SPEC-SCNC: 9.9 MG/DL (ref 8.6–10.5)
CHLORIDE SERPL-SCNC: 98 MMOL/L (ref 98–107)
CLARITY UR: ABNORMAL
CO2 BLDA-SCNC: 23 MMOL/L (ref 22–33)
CO2 SERPL-SCNC: 18.2 MMOL/L (ref 22–29)
COHGB MFR BLD: 1.5 % (ref 0–5)
COLOR UR: YELLOW
CREAT SERPL-MCNC: 0.71 MG/DL (ref 0.57–1)
DEPRECATED RDW RBC AUTO: 42.5 FL (ref 37–54)
EGFRCR SERPLBLD CKD-EPI 2021: 113.2 ML/MIN/1.73
EOSINOPHIL # BLD AUTO: 0.18 10*3/MM3 (ref 0–0.4)
EOSINOPHIL NFR BLD AUTO: 3.3 % (ref 0.3–6.2)
ERYTHROCYTE [DISTWIDTH] IN BLOOD BY AUTOMATED COUNT: 12.8 % (ref 12.3–15.4)
GLOBULIN UR ELPH-MCNC: 3.2 GM/DL
GLUCOSE SERPL-MCNC: 329 MG/DL (ref 65–99)
GLUCOSE UR STRIP-MCNC: ABNORMAL MG/DL
HCO3 BLDA-SCNC: 21.8 MMOL/L (ref 20–26)
HCT VFR BLD AUTO: 35.5 % (ref 34–46.6)
HCT VFR BLD CALC: 39.3 % (ref 38–51)
HGB BLD-MCNC: 12 G/DL (ref 12–15.9)
HGB BLDA-MCNC: 12.8 G/DL (ref 13.5–17.5)
HGB UR QL STRIP.AUTO: ABNORMAL
HYALINE CASTS UR QL AUTO: ABNORMAL /LPF
IMM GRANULOCYTES # BLD AUTO: 0.04 10*3/MM3 (ref 0–0.05)
IMM GRANULOCYTES NFR BLD AUTO: 0.7 % (ref 0–0.5)
INHALED O2 CONCENTRATION: 21 %
KETONES UR QL STRIP: ABNORMAL
LEUKOCYTE ESTERASE UR QL STRIP.AUTO: ABNORMAL
LYMPHOCYTES # BLD AUTO: 1.27 10*3/MM3 (ref 0.7–3.1)
LYMPHOCYTES NFR BLD AUTO: 23 % (ref 19.6–45.3)
Lab: ABNORMAL
MCH RBC QN AUTO: 31.3 PG (ref 26.6–33)
MCHC RBC AUTO-ENTMCNC: 33.8 G/DL (ref 31.5–35.7)
MCV RBC AUTO: 92.4 FL (ref 79–97)
METHGB BLD QL: <-0.1 % (ref 0–3)
MODALITY: ABNORMAL
MONOCYTES # BLD AUTO: 0.42 10*3/MM3 (ref 0.1–0.9)
MONOCYTES NFR BLD AUTO: 7.6 % (ref 5–12)
NEUTROPHILS NFR BLD AUTO: 3.58 10*3/MM3 (ref 1.7–7)
NEUTROPHILS NFR BLD AUTO: 64.7 % (ref 42.7–76)
NITRITE UR QL STRIP: NEGATIVE
NOTE: ABNORMAL
NRBC BLD AUTO-RTO: 0 /100 WBC (ref 0–0.2)
OXYHGB MFR BLDV: 97.9 % (ref 94–99)
PCO2 BLDA: 38.2 MM HG (ref 35–45)
PCO2 TEMP ADJ BLD: ABNORMAL MM[HG]
PH BLDA: 7.37 PH UNITS (ref 7.35–7.45)
PH UR STRIP.AUTO: 5.5 [PH] (ref 5–8)
PH, TEMP CORRECTED: ABNORMAL
PLATELET # BLD AUTO: 221 10*3/MM3 (ref 140–450)
PMV BLD AUTO: 8.8 FL (ref 6–12)
PO2 BLDA: 107 MM HG (ref 83–108)
PO2 TEMP ADJ BLD: ABNORMAL MM[HG]
POTASSIUM SERPL-SCNC: 4 MMOL/L (ref 3.5–5.2)
PROT SERPL-MCNC: 7.6 G/DL (ref 6–8.5)
PROT UR QL STRIP: ABNORMAL
RBC # BLD AUTO: 3.84 10*6/MM3 (ref 3.77–5.28)
RBC # UR STRIP: ABNORMAL /HPF
REF LAB TEST METHOD: ABNORMAL
SAO2 % BLDCOA: 99.2 % (ref 94–99)
SODIUM SERPL-SCNC: 136 MMOL/L (ref 136–145)
SP GR UR STRIP: 1.02 (ref 1–1.03)
SQUAMOUS #/AREA URNS HPF: ABNORMAL /HPF
UROBILINOGEN UR QL STRIP: ABNORMAL
VENTILATOR MODE: ABNORMAL
WBC # UR STRIP: ABNORMAL /HPF
WBC NRBC COR # BLD: 5.53 10*3/MM3 (ref 3.4–10.8)
YEAST URNS QL MICRO: ABNORMAL /HPF

## 2022-07-29 PROCEDURE — 82375 ASSAY CARBOXYHB QUANT: CPT

## 2022-07-29 PROCEDURE — 36600 WITHDRAWAL OF ARTERIAL BLOOD: CPT

## 2022-07-29 PROCEDURE — 25010000002 METOCLOPRAMIDE PER 10 MG: Performed by: STUDENT IN AN ORGANIZED HEALTH CARE EDUCATION/TRAINING PROGRAM

## 2022-07-29 PROCEDURE — 87186 SC STD MICRODIL/AGAR DIL: CPT | Performed by: PHYSICIAN ASSISTANT

## 2022-07-29 PROCEDURE — 80053 COMPREHEN METABOLIC PANEL: CPT | Performed by: PHYSICIAN ASSISTANT

## 2022-07-29 PROCEDURE — 96374 THER/PROPH/DIAG INJ IV PUSH: CPT

## 2022-07-29 PROCEDURE — 82805 BLOOD GASES W/O2 SATURATION: CPT

## 2022-07-29 PROCEDURE — 81001 URINALYSIS AUTO W/SCOPE: CPT | Performed by: PHYSICIAN ASSISTANT

## 2022-07-29 PROCEDURE — 82009 KETONE BODYS QUAL: CPT | Performed by: STUDENT IN AN ORGANIZED HEALTH CARE EDUCATION/TRAINING PROGRAM

## 2022-07-29 PROCEDURE — 63710000001 INSULIN REGULAR HUMAN PER 5 UNITS: Performed by: STUDENT IN AN ORGANIZED HEALTH CARE EDUCATION/TRAINING PROGRAM

## 2022-07-29 PROCEDURE — 99283 EMERGENCY DEPT VISIT LOW MDM: CPT

## 2022-07-29 PROCEDURE — 51798 US URINE CAPACITY MEASURE: CPT

## 2022-07-29 PROCEDURE — 87086 URINE CULTURE/COLONY COUNT: CPT | Performed by: PHYSICIAN ASSISTANT

## 2022-07-29 PROCEDURE — 87077 CULTURE AEROBIC IDENTIFY: CPT | Performed by: PHYSICIAN ASSISTANT

## 2022-07-29 PROCEDURE — 85025 COMPLETE CBC W/AUTO DIFF WBC: CPT | Performed by: PHYSICIAN ASSISTANT

## 2022-07-29 PROCEDURE — 83050 HGB METHEMOGLOBIN QUAN: CPT

## 2022-07-29 PROCEDURE — 36415 COLL VENOUS BLD VENIPUNCTURE: CPT

## 2022-07-29 RX ORDER — FLUCONAZOLE 100 MG/1
200 TABLET ORAL ONCE
Status: COMPLETED | OUTPATIENT
Start: 2022-07-29 | End: 2022-07-29

## 2022-07-29 RX ORDER — NITROFURANTOIN 25; 75 MG/1; MG/1
100 CAPSULE ORAL 2 TIMES DAILY
Qty: 13 CAPSULE | Refills: 0 | Status: SHIPPED | OUTPATIENT
Start: 2022-07-29 | End: 2022-08-24

## 2022-07-29 RX ORDER — ACETAMINOPHEN 500 MG
1000 TABLET ORAL ONCE
Status: COMPLETED | OUTPATIENT
Start: 2022-07-29 | End: 2022-07-29

## 2022-07-29 RX ORDER — SODIUM CHLORIDE 0.9 % (FLUSH) 0.9 %
10 SYRINGE (ML) INJECTION AS NEEDED
Status: DISCONTINUED | OUTPATIENT
Start: 2022-07-29 | End: 2022-07-30 | Stop reason: HOSPADM

## 2022-07-29 RX ORDER — METOCLOPRAMIDE HYDROCHLORIDE 5 MG/ML
10 INJECTION INTRAMUSCULAR; INTRAVENOUS ONCE
Status: COMPLETED | OUTPATIENT
Start: 2022-07-29 | End: 2022-07-29

## 2022-07-29 RX ORDER — NITROFURANTOIN 25; 75 MG/1; MG/1
100 CAPSULE ORAL ONCE
Status: COMPLETED | OUTPATIENT
Start: 2022-07-29 | End: 2022-07-29

## 2022-07-29 RX ADMIN — NITROFURANTOIN MONOHYDRATE/MACROCRYSTALLINE 100 MG: 25; 75 CAPSULE ORAL at 22:53

## 2022-07-29 RX ADMIN — ACETAMINOPHEN 1000 MG: 500 TABLET ORAL at 22:53

## 2022-07-29 RX ADMIN — METOCLOPRAMIDE 10 MG: 5 INJECTION, SOLUTION INTRAMUSCULAR; INTRAVENOUS at 22:53

## 2022-07-29 RX ADMIN — HUMAN INSULIN 5 UNITS: 100 INJECTION, SOLUTION SUBCUTANEOUS at 22:53

## 2022-07-29 RX ADMIN — FLUCONAZOLE 200 MG: 100 TABLET ORAL at 22:56

## 2022-07-29 RX ADMIN — SODIUM CHLORIDE 1000 ML: 9 INJECTION, SOLUTION INTRAVENOUS at 22:19

## 2022-07-31 DIAGNOSIS — Z79.4 TYPE 2 DIABETES MELLITUS WITH HYPERGLYCEMIA, WITH LONG-TERM CURRENT USE OF INSULIN: ICD-10-CM

## 2022-07-31 DIAGNOSIS — E11.65 TYPE 2 DIABETES MELLITUS WITH HYPERGLYCEMIA, WITH LONG-TERM CURRENT USE OF INSULIN: ICD-10-CM

## 2022-07-31 LAB — BACTERIA SPEC AEROBE CULT: ABNORMAL

## 2022-08-01 ENCOUNTER — HOSPITAL ENCOUNTER (EMERGENCY)
Facility: HOSPITAL | Age: 37
Discharge: LEFT BEFORE DISCHARGE | End: 2022-08-01
Attending: EMERGENCY MEDICINE | Admitting: EMERGENCY MEDICINE

## 2022-08-01 ENCOUNTER — APPOINTMENT (OUTPATIENT)
Dept: CT IMAGING | Facility: HOSPITAL | Age: 37
End: 2022-08-01

## 2022-08-01 VITALS
WEIGHT: 270 LBS | OXYGEN SATURATION: 95 % | TEMPERATURE: 98.5 F | HEART RATE: 106 BPM | DIASTOLIC BLOOD PRESSURE: 97 MMHG | BODY MASS INDEX: 46.1 KG/M2 | HEIGHT: 64 IN | SYSTOLIC BLOOD PRESSURE: 156 MMHG | RESPIRATION RATE: 16 BRPM

## 2022-08-01 DIAGNOSIS — N30.01 ACUTE CYSTITIS WITH HEMATURIA: Primary | ICD-10-CM

## 2022-08-01 PROBLEM — N30.00 ACUTE CYSTITIS WITHOUT HEMATURIA: Status: ACTIVE | Noted: 2022-08-01

## 2022-08-01 LAB
ACETONE BLD QL: NEGATIVE
ALBUMIN SERPL-MCNC: 4.27 G/DL (ref 3.5–5.2)
ALBUMIN/GLOB SERPL: 1.4 G/DL
ALP SERPL-CCNC: 137 U/L (ref 39–117)
ALT SERPL W P-5'-P-CCNC: 26 U/L (ref 1–33)
AMPHET+METHAMPHET UR QL: NEGATIVE
AMPHETAMINES UR QL: NEGATIVE
ANION GAP SERPL CALCULATED.3IONS-SCNC: 16.4 MMOL/L (ref 5–15)
AST SERPL-CCNC: 35 U/L (ref 1–32)
B-HCG UR QL: NEGATIVE
BACTERIA UR QL AUTO: ABNORMAL /HPF
BARBITURATES UR QL SCN: NEGATIVE
BASOPHILS # BLD AUTO: 0.04 10*3/MM3 (ref 0–0.2)
BASOPHILS NFR BLD AUTO: 0.9 % (ref 0–1.5)
BENZODIAZ UR QL SCN: NEGATIVE
BILIRUB SERPL-MCNC: 0.3 MG/DL (ref 0–1.2)
BILIRUB UR QL STRIP: NEGATIVE
BUN SERPL-MCNC: 9 MG/DL (ref 6–20)
BUN/CREAT SERPL: 14.3 (ref 7–25)
BUPRENORPHINE SERPL-MCNC: NEGATIVE NG/ML
CALCIUM SPEC-SCNC: 9.3 MG/DL (ref 8.6–10.5)
CANNABINOIDS SERPL QL: NEGATIVE
CHLORIDE SERPL-SCNC: 97 MMOL/L (ref 98–107)
CLARITY UR: ABNORMAL
CO2 SERPL-SCNC: 19.6 MMOL/L (ref 22–29)
COCAINE UR QL: NEGATIVE
COLOR UR: YELLOW
CREAT SERPL-MCNC: 0.63 MG/DL (ref 0.57–1)
CRP SERPL-MCNC: 5.59 MG/DL (ref 0–0.5)
D-LACTATE SERPL-SCNC: 4.4 MMOL/L (ref 0.5–2)
D-LACTATE SERPL-SCNC: 5.4 MMOL/L (ref 0.5–2)
DEPRECATED RDW RBC AUTO: 42.2 FL (ref 37–54)
EGFRCR SERPLBLD CKD-EPI 2021: 118.1 ML/MIN/1.73
EOSINOPHIL # BLD AUTO: 0.14 10*3/MM3 (ref 0–0.4)
EOSINOPHIL NFR BLD AUTO: 3.1 % (ref 0.3–6.2)
ERYTHROCYTE [DISTWIDTH] IN BLOOD BY AUTOMATED COUNT: 12.9 % (ref 12.3–15.4)
FLUAV SUBTYP SPEC NAA+PROBE: NOT DETECTED
FLUBV RNA ISLT QL NAA+PROBE: NOT DETECTED
GLOBULIN UR ELPH-MCNC: 3 GM/DL
GLUCOSE SERPL-MCNC: 334 MG/DL (ref 65–99)
GLUCOSE UR STRIP-MCNC: ABNORMAL MG/DL
HCT VFR BLD AUTO: 32.3 % (ref 34–46.6)
HGB BLD-MCNC: 11 G/DL (ref 12–15.9)
HGB UR QL STRIP.AUTO: ABNORMAL
HYALINE CASTS UR QL AUTO: ABNORMAL /LPF
IMM GRANULOCYTES # BLD AUTO: 0.04 10*3/MM3 (ref 0–0.05)
IMM GRANULOCYTES NFR BLD AUTO: 0.9 % (ref 0–0.5)
KETONES UR QL STRIP: ABNORMAL
LEUKOCYTE ESTERASE UR QL STRIP.AUTO: ABNORMAL
LIPASE SERPL-CCNC: 31 U/L (ref 13–60)
LYMPHOCYTES # BLD AUTO: 1.07 10*3/MM3 (ref 0.7–3.1)
LYMPHOCYTES NFR BLD AUTO: 23.8 % (ref 19.6–45.3)
MCH RBC QN AUTO: 31.3 PG (ref 26.6–33)
MCHC RBC AUTO-ENTMCNC: 34.1 G/DL (ref 31.5–35.7)
MCV RBC AUTO: 91.8 FL (ref 79–97)
METHADONE UR QL SCN: NEGATIVE
MONOCYTES # BLD AUTO: 0.42 10*3/MM3 (ref 0.1–0.9)
MONOCYTES NFR BLD AUTO: 9.4 % (ref 5–12)
NEUTROPHILS NFR BLD AUTO: 2.78 10*3/MM3 (ref 1.7–7)
NEUTROPHILS NFR BLD AUTO: 61.9 % (ref 42.7–76)
NITRITE UR QL STRIP: NEGATIVE
NRBC BLD AUTO-RTO: 0 /100 WBC (ref 0–0.2)
OPIATES UR QL: POSITIVE
OXYCODONE UR QL SCN: NEGATIVE
PCP UR QL SCN: NEGATIVE
PH UR STRIP.AUTO: 5.5 [PH] (ref 5–8)
PLATELET # BLD AUTO: 178 10*3/MM3 (ref 140–450)
PMV BLD AUTO: 8.5 FL (ref 6–12)
POTASSIUM SERPL-SCNC: 3.6 MMOL/L (ref 3.5–5.2)
PROPOXYPH UR QL: NEGATIVE
PROT SERPL-MCNC: 7.3 G/DL (ref 6–8.5)
PROT UR QL STRIP: ABNORMAL
RBC # BLD AUTO: 3.52 10*6/MM3 (ref 3.77–5.28)
RBC # UR STRIP: ABNORMAL /HPF
REF LAB TEST METHOD: ABNORMAL
SARS-COV-2 RNA PNL SPEC NAA+PROBE: NOT DETECTED
SODIUM SERPL-SCNC: 133 MMOL/L (ref 136–145)
SP GR UR STRIP: 1.02 (ref 1–1.03)
SQUAMOUS #/AREA URNS HPF: ABNORMAL /HPF
TRICYCLICS UR QL SCN: NEGATIVE
UROBILINOGEN UR QL STRIP: ABNORMAL
WBC # UR STRIP: ABNORMAL /HPF
WBC NRBC COR # BLD: 4.49 10*3/MM3 (ref 3.4–10.8)

## 2022-08-01 PROCEDURE — 81025 URINE PREGNANCY TEST: CPT | Performed by: PHYSICIAN ASSISTANT

## 2022-08-01 PROCEDURE — 99282 EMERGENCY DEPT VISIT SF MDM: CPT

## 2022-08-01 PROCEDURE — 74176 CT ABD & PELVIS W/O CONTRAST: CPT

## 2022-08-01 PROCEDURE — 83690 ASSAY OF LIPASE: CPT | Performed by: PHYSICIAN ASSISTANT

## 2022-08-01 PROCEDURE — 87636 SARSCOV2 & INF A&B AMP PRB: CPT | Performed by: PHYSICIAN ASSISTANT

## 2022-08-01 PROCEDURE — 85025 COMPLETE CBC W/AUTO DIFF WBC: CPT | Performed by: PHYSICIAN ASSISTANT

## 2022-08-01 PROCEDURE — 87186 SC STD MICRODIL/AGAR DIL: CPT | Performed by: PHYSICIAN ASSISTANT

## 2022-08-01 PROCEDURE — 87086 URINE CULTURE/COLONY COUNT: CPT | Performed by: PHYSICIAN ASSISTANT

## 2022-08-01 PROCEDURE — 82009 KETONE BODYS QUAL: CPT | Performed by: INTERNAL MEDICINE

## 2022-08-01 PROCEDURE — 25010000002 ONDANSETRON PER 1 MG: Performed by: PHYSICIAN ASSISTANT

## 2022-08-01 PROCEDURE — 25010000002 MORPHINE PER 10 MG: Performed by: EMERGENCY MEDICINE

## 2022-08-01 PROCEDURE — 96376 TX/PRO/DX INJ SAME DRUG ADON: CPT

## 2022-08-01 PROCEDURE — 96375 TX/PRO/DX INJ NEW DRUG ADDON: CPT

## 2022-08-01 PROCEDURE — 96365 THER/PROPH/DIAG IV INF INIT: CPT

## 2022-08-01 PROCEDURE — 99284 EMERGENCY DEPT VISIT MOD MDM: CPT

## 2022-08-01 PROCEDURE — 25010000002 MEROPENEM PER 100 MG: Performed by: PHYSICIAN ASSISTANT

## 2022-08-01 PROCEDURE — 80053 COMPREHEN METABOLIC PANEL: CPT | Performed by: PHYSICIAN ASSISTANT

## 2022-08-01 PROCEDURE — 87040 BLOOD CULTURE FOR BACTERIA: CPT | Performed by: PHYSICIAN ASSISTANT

## 2022-08-01 PROCEDURE — 81001 URINALYSIS AUTO W/SCOPE: CPT | Performed by: PHYSICIAN ASSISTANT

## 2022-08-01 PROCEDURE — 36415 COLL VENOUS BLD VENIPUNCTURE: CPT

## 2022-08-01 PROCEDURE — 86140 C-REACTIVE PROTEIN: CPT | Performed by: PHYSICIAN ASSISTANT

## 2022-08-01 PROCEDURE — 80306 DRUG TEST PRSMV INSTRMNT: CPT | Performed by: PHYSICIAN ASSISTANT

## 2022-08-01 PROCEDURE — 83605 ASSAY OF LACTIC ACID: CPT | Performed by: PHYSICIAN ASSISTANT

## 2022-08-01 RX ORDER — POTASSIUM CHLORIDE 7.45 MG/ML
10 INJECTION INTRAVENOUS
Status: DISCONTINUED | OUTPATIENT
Start: 2022-08-01 | End: 2022-08-01 | Stop reason: HOSPADM

## 2022-08-01 RX ORDER — CALCIUM CARBONATE 200(500)MG
2 TABLET,CHEWABLE ORAL 2 TIMES DAILY PRN
Status: CANCELLED | OUTPATIENT
Start: 2022-08-01

## 2022-08-01 RX ORDER — SODIUM CHLORIDE 0.9 % (FLUSH) 0.9 %
10 SYRINGE (ML) INJECTION AS NEEDED
Status: DISCONTINUED | OUTPATIENT
Start: 2022-08-01 | End: 2022-08-01 | Stop reason: HOSPADM

## 2022-08-01 RX ORDER — ONDANSETRON 2 MG/ML
4 INJECTION INTRAMUSCULAR; INTRAVENOUS ONCE
Status: COMPLETED | OUTPATIENT
Start: 2022-08-01 | End: 2022-08-01

## 2022-08-01 RX ORDER — SODIUM CHLORIDE 0.9 % (FLUSH) 0.9 %
10 SYRINGE (ML) INJECTION EVERY 12 HOURS SCHEDULED
Status: CANCELLED | OUTPATIENT
Start: 2022-08-01

## 2022-08-01 RX ORDER — DEXTROSE MONOHYDRATE 25 G/50ML
25 INJECTION, SOLUTION INTRAVENOUS
Status: CANCELLED | OUTPATIENT
Start: 2022-08-01

## 2022-08-01 RX ORDER — MAGNESIUM SULFATE HEPTAHYDRATE 40 MG/ML
4 INJECTION, SOLUTION INTRAVENOUS AS NEEDED
Status: DISCONTINUED | OUTPATIENT
Start: 2022-08-01 | End: 2022-08-01 | Stop reason: HOSPADM

## 2022-08-01 RX ORDER — NICOTINE POLACRILEX 4 MG
15 LOZENGE BUCCAL
Status: CANCELLED | OUTPATIENT
Start: 2022-08-01

## 2022-08-01 RX ORDER — MAGNESIUM SULFATE HEPTAHYDRATE 40 MG/ML
2 INJECTION, SOLUTION INTRAVENOUS AS NEEDED
Status: DISCONTINUED | OUTPATIENT
Start: 2022-08-01 | End: 2022-08-01 | Stop reason: HOSPADM

## 2022-08-01 RX ORDER — POTASSIUM CHLORIDE 1.5 G/1.77G
40 POWDER, FOR SOLUTION ORAL AS NEEDED
Status: DISCONTINUED | OUTPATIENT
Start: 2022-08-01 | End: 2022-08-01 | Stop reason: HOSPADM

## 2022-08-01 RX ORDER — POTASSIUM CHLORIDE 1500 MG/1
40 TABLET, FILM COATED, EXTENDED RELEASE ORAL AS NEEDED
Status: DISCONTINUED | OUTPATIENT
Start: 2022-08-01 | End: 2022-08-01 | Stop reason: HOSPADM

## 2022-08-01 RX ORDER — MORPHINE SULFATE 2 MG/ML
2 INJECTION, SOLUTION INTRAMUSCULAR; INTRAVENOUS ONCE
Status: COMPLETED | OUTPATIENT
Start: 2022-08-01 | End: 2022-08-01

## 2022-08-01 RX ORDER — INSULIN GLARGINE 100 [IU]/ML
50 INJECTION, SOLUTION SUBCUTANEOUS DAILY
Qty: 15 ML | Refills: 0 | Status: SHIPPED | OUTPATIENT
Start: 2022-08-01 | End: 2022-08-24

## 2022-08-01 RX ORDER — SODIUM CHLORIDE 0.9 % (FLUSH) 0.9 %
10 SYRINGE (ML) INJECTION AS NEEDED
Status: CANCELLED | OUTPATIENT
Start: 2022-08-01

## 2022-08-01 RX ORDER — INSULIN ASPART 100 [IU]/ML
0-9 INJECTION, SOLUTION INTRAVENOUS; SUBCUTANEOUS
Status: CANCELLED | OUTPATIENT
Start: 2022-08-01

## 2022-08-01 RX ADMIN — MORPHINE SULFATE 4 MG: 4 INJECTION, SOLUTION INTRAMUSCULAR; INTRAVENOUS at 13:00

## 2022-08-01 RX ADMIN — MORPHINE SULFATE 2 MG: 2 INJECTION, SOLUTION INTRAMUSCULAR; INTRAVENOUS at 17:58

## 2022-08-01 RX ADMIN — MEROPENEM 1 G: 1 INJECTION, POWDER, FOR SOLUTION INTRAVENOUS at 14:00

## 2022-08-01 RX ADMIN — ONDANSETRON 4 MG: 2 INJECTION INTRAMUSCULAR; INTRAVENOUS at 13:00

## 2022-08-01 RX ADMIN — SODIUM CHLORIDE 1000 ML: 9 INJECTION, SOLUTION INTRAVENOUS at 15:38

## 2022-08-03 LAB — BACTERIA SPEC AEROBE CULT: ABNORMAL

## 2022-08-06 LAB
BACTERIA SPEC AEROBE CULT: NORMAL
BACTERIA SPEC AEROBE CULT: NORMAL

## 2022-08-16 ENCOUNTER — LAB REQUISITION (OUTPATIENT)
Dept: LAB | Facility: HOSPITAL | Age: 37
End: 2022-08-16

## 2022-08-16 ENCOUNTER — HOSPITAL ENCOUNTER (EMERGENCY)
Facility: HOSPITAL | Age: 37
Discharge: LEFT BEFORE DISCHARGE | End: 2022-08-16
Attending: STUDENT IN AN ORGANIZED HEALTH CARE EDUCATION/TRAINING PROGRAM | Admitting: STUDENT IN AN ORGANIZED HEALTH CARE EDUCATION/TRAINING PROGRAM

## 2022-08-16 VITALS
SYSTOLIC BLOOD PRESSURE: 194 MMHG | DIASTOLIC BLOOD PRESSURE: 134 MMHG | HEART RATE: 95 BPM | WEIGHT: 278 LBS | BODY MASS INDEX: 47.46 KG/M2 | TEMPERATURE: 98.8 F | RESPIRATION RATE: 20 BRPM | OXYGEN SATURATION: 100 % | HEIGHT: 64 IN

## 2022-08-16 DIAGNOSIS — R10.84 GENERALIZED ABDOMINAL PAIN: Primary | ICD-10-CM

## 2022-08-16 DIAGNOSIS — E11.9 TYPE 2 DIABETES MELLITUS WITHOUT COMPLICATIONS: ICD-10-CM

## 2022-08-16 DIAGNOSIS — R09.02 HYPOXEMIA: ICD-10-CM

## 2022-08-16 DIAGNOSIS — R10.9 UNSPECIFIED ABDOMINAL PAIN: ICD-10-CM

## 2022-08-16 DIAGNOSIS — I10 ESSENTIAL (PRIMARY) HYPERTENSION: ICD-10-CM

## 2022-08-16 DIAGNOSIS — N39.0 URINARY TRACT INFECTION, SITE NOT SPECIFIED: ICD-10-CM

## 2022-08-16 LAB
ALBUMIN SERPL-MCNC: 4.12 G/DL (ref 3.5–5.2)
ALBUMIN SERPL-MCNC: 4.14 G/DL (ref 3.5–5.2)
ALBUMIN/GLOB SERPL: 1.4 G/DL
ALP SERPL-CCNC: 107 U/L (ref 39–117)
ALP SERPL-CCNC: 110 U/L (ref 39–117)
ALT SERPL W P-5'-P-CCNC: 22 U/L (ref 1–33)
ALT SERPL W P-5'-P-CCNC: 24 U/L (ref 1–33)
ANION GAP SERPL CALCULATED.3IONS-SCNC: 16.5 MMOL/L (ref 5–15)
AST SERPL-CCNC: 35 U/L (ref 1–32)
AST SERPL-CCNC: 40 U/L (ref 1–32)
BACTERIA UR QL AUTO: ABNORMAL /HPF
BASOPHILS # BLD AUTO: 0.04 10*3/MM3 (ref 0–0.2)
BASOPHILS NFR BLD AUTO: 0.7 % (ref 0–1.5)
BILIRUB CONJ SERPL-MCNC: <0.2 MG/DL (ref 0–0.3)
BILIRUB INDIRECT SERPL-MCNC: ABNORMAL MG/DL
BILIRUB SERPL-MCNC: 0.3 MG/DL (ref 0–1.2)
BILIRUB SERPL-MCNC: 0.4 MG/DL (ref 0–1.2)
BILIRUB UR QL STRIP: NEGATIVE
BUN SERPL-MCNC: 16 MG/DL (ref 6–20)
BUN SERPL-MCNC: 17 MG/DL (ref 6–20)
BUN/CREAT SERPL: 21.1 (ref 7–25)
CALCIUM SPEC-SCNC: 9.3 MG/DL (ref 8.6–10.5)
CHLORIDE SERPL-SCNC: 99 MMOL/L (ref 98–107)
CLARITY UR: ABNORMAL
CO2 SERPL-SCNC: 23.5 MMOL/L (ref 22–29)
COLOR UR: YELLOW
CREAT SERPL-MCNC: 0.69 MG/DL (ref 0.57–1)
CREAT SERPL-MCNC: 0.76 MG/DL (ref 0.57–1)
D-LACTATE SERPL-SCNC: 2.4 MMOL/L (ref 0.5–2)
DEPRECATED RDW RBC AUTO: 41.1 FL (ref 37–54)
EGFRCR SERPLBLD CKD-EPI 2021: 104.3 ML/MIN/1.73
EGFRCR SERPLBLD CKD-EPI 2021: 115.5 ML/MIN/1.73
EOSINOPHIL # BLD AUTO: 0.49 10*3/MM3 (ref 0–0.4)
EOSINOPHIL NFR BLD AUTO: 8.2 % (ref 0.3–6.2)
ERYTHROCYTE [DISTWIDTH] IN BLOOD BY AUTOMATED COUNT: 12.6 % (ref 12.3–15.4)
GLOBULIN UR ELPH-MCNC: 3.1 GM/DL
GLUCOSE SERPL-MCNC: 191 MG/DL (ref 65–99)
GLUCOSE UR STRIP-MCNC: NEGATIVE MG/DL
HCT VFR BLD AUTO: 34.9 % (ref 34–46.6)
HGB BLD-MCNC: 11.5 G/DL (ref 12–15.9)
HGB UR QL STRIP.AUTO: ABNORMAL
HOLD SPECIMEN: NORMAL
HOLD SPECIMEN: NORMAL
HYALINE CASTS UR QL AUTO: ABNORMAL /LPF
IMM GRANULOCYTES # BLD AUTO: 0.05 10*3/MM3 (ref 0–0.05)
IMM GRANULOCYTES NFR BLD AUTO: 0.8 % (ref 0–0.5)
KETONES UR QL STRIP: NEGATIVE
LEUKOCYTE ESTERASE UR QL STRIP.AUTO: ABNORMAL
LIPASE SERPL-CCNC: 61 U/L (ref 13–60)
LYMPHOCYTES # BLD AUTO: 1.51 10*3/MM3 (ref 0.7–3.1)
LYMPHOCYTES NFR BLD AUTO: 25.3 % (ref 19.6–45.3)
MCH RBC QN AUTO: 29.9 PG (ref 26.6–33)
MCHC RBC AUTO-ENTMCNC: 33 G/DL (ref 31.5–35.7)
MCV RBC AUTO: 90.9 FL (ref 79–97)
MONOCYTES # BLD AUTO: 0.39 10*3/MM3 (ref 0.1–0.9)
MONOCYTES NFR BLD AUTO: 6.5 % (ref 5–12)
NEUTROPHILS NFR BLD AUTO: 3.5 10*3/MM3 (ref 1.7–7)
NEUTROPHILS NFR BLD AUTO: 58.5 % (ref 42.7–76)
NITRITE UR QL STRIP: NEGATIVE
NRBC BLD AUTO-RTO: 0 /100 WBC (ref 0–0.2)
PH UR STRIP.AUTO: 7 [PH] (ref 5–8)
PLATELET # BLD AUTO: 241 10*3/MM3 (ref 140–450)
PMV BLD AUTO: 9.3 FL (ref 6–12)
POTASSIUM SERPL-SCNC: 3.9 MMOL/L (ref 3.5–5.2)
PROT SERPL-MCNC: 6.7 G/DL (ref 6–8.5)
PROT SERPL-MCNC: 7.2 G/DL (ref 6–8.5)
PROT UR QL STRIP: ABNORMAL
RBC # BLD AUTO: 3.84 10*6/MM3 (ref 3.77–5.28)
RBC # UR STRIP: ABNORMAL /HPF
REF LAB TEST METHOD: ABNORMAL
SODIUM SERPL-SCNC: 139 MMOL/L (ref 136–145)
SP GR UR STRIP: 1.02 (ref 1–1.03)
SQUAMOUS #/AREA URNS HPF: ABNORMAL /HPF
UROBILINOGEN UR QL STRIP: ABNORMAL
WBC # UR STRIP: ABNORMAL /HPF
WBC NRBC COR # BLD: 5.98 10*3/MM3 (ref 3.4–10.8)
WHOLE BLOOD HOLD COAG: NORMAL
WHOLE BLOOD HOLD SPECIMEN: NORMAL

## 2022-08-16 PROCEDURE — 82565 ASSAY OF CREATININE: CPT | Performed by: NURSE PRACTITIONER

## 2022-08-16 PROCEDURE — 83690 ASSAY OF LIPASE: CPT | Performed by: STUDENT IN AN ORGANIZED HEALTH CARE EDUCATION/TRAINING PROGRAM

## 2022-08-16 PROCEDURE — 36415 COLL VENOUS BLD VENIPUNCTURE: CPT

## 2022-08-16 PROCEDURE — 83605 ASSAY OF LACTIC ACID: CPT | Performed by: STUDENT IN AN ORGANIZED HEALTH CARE EDUCATION/TRAINING PROGRAM

## 2022-08-16 PROCEDURE — 81001 URINALYSIS AUTO W/SCOPE: CPT | Performed by: STUDENT IN AN ORGANIZED HEALTH CARE EDUCATION/TRAINING PROGRAM

## 2022-08-16 PROCEDURE — 99283 EMERGENCY DEPT VISIT LOW MDM: CPT

## 2022-08-16 PROCEDURE — 82248 BILIRUBIN DIRECT: CPT | Performed by: STUDENT IN AN ORGANIZED HEALTH CARE EDUCATION/TRAINING PROGRAM

## 2022-08-16 PROCEDURE — 85025 COMPLETE CBC W/AUTO DIFF WBC: CPT | Performed by: STUDENT IN AN ORGANIZED HEALTH CARE EDUCATION/TRAINING PROGRAM

## 2022-08-16 PROCEDURE — 80053 COMPREHEN METABOLIC PANEL: CPT | Performed by: STUDENT IN AN ORGANIZED HEALTH CARE EDUCATION/TRAINING PROGRAM

## 2022-08-16 PROCEDURE — 84520 ASSAY OF UREA NITROGEN: CPT | Performed by: NURSE PRACTITIONER

## 2022-08-16 RX ORDER — SODIUM CHLORIDE 0.9 % (FLUSH) 0.9 %
10 SYRINGE (ML) INJECTION AS NEEDED
Status: DISCONTINUED | OUTPATIENT
Start: 2022-08-16 | End: 2022-08-16 | Stop reason: HOSPADM

## 2022-08-16 RX ORDER — ACETAMINOPHEN 500 MG
1000 TABLET ORAL ONCE
Status: DISCONTINUED | OUTPATIENT
Start: 2022-08-16 | End: 2022-08-16 | Stop reason: HOSPADM

## 2022-08-16 NOTE — ED PROVIDER NOTES
Subjective   37-year-old female with a past medical history of atrial fibrillation, chronic kidney disease, diabetes, depression, and hypertension presents to the ER due to concerns for left-sided flank pain.  Patient is a frequent flyer for similar complaints.  Patient noted she was recently discharged from the [facility] for a UTI requiring a PICC line placement for IV antibiotics in the outpatient setting.  Patient denied new fever or chills.  Patient noted worsening abdominal pain.  No obvious changes in bowel or urinary habits.  Vitals stable.  Patient is refusing to be seen by me          Review of Systems   Gastrointestinal: Positive for abdominal pain.   Genitourinary: Positive for flank pain.   All other systems reviewed and are negative.      Past Medical History:   Diagnosis Date   • A-fib (HCC)    • Abnormal ECG    • Anemia    • Anxiety    • Asthma    • Cancer (HCC)     Ovarian   • Depression    • Diabetes mellitus (HCC)    • DVT (deep venous thrombosis) (HCC)    • Factor 5 Leiden mutation, heterozygous (HCC)    • Fibroid    • GERD (gastroesophageal reflux disease)    • Gout    • H/O abdominal abscess    • History of sepsis    • History of transfusion    • Hyperlipidemia    • Hypothyroid    • Kidney stone    • Migraines    • Neuropathy    • Ovarian cancer (HCC) 2021   • Ovarian cyst    • PE (pulmonary embolism)    • Polycystic ovary syndrome    • Preeclampsia    • Rh incompatibility    • Stroke (HCC)    • TIA (transient ischemic attack)    • Urinary tract infection    • Varicella        Allergies   Allergen Reactions   • Toradol [Ketorolac Tromethamine] Anaphylaxis and Hives   • Haldol [Haloperidol] Hives and Mental Status Change   • Tramadol Hives and Swelling   • Amoxicillin Hives and Rash   • Penicillins Hives and Rash       Past Surgical History:   Procedure Laterality Date   • ABDOMINAL SURGERY     • CARDIAC CATHETERIZATION     •  SECTION     • CHOLECYSTECTOMY     •  COLONOSCOPY     • ENDOSCOPY     • EXTRACORPOREAL SHOCK WAVE LITHOTRIPSY (ESWL) Left 10/22/2021    Procedure: EXTRACORPOREAL SHOCKWAVE LITHOTRIPSY;  Surgeon: Milan Motley MD;  Location: [facility];  Service: Urology;  Laterality: Left;   • LITHOTRIPSY     • RIGHT OOPHORECTOMY     • URETEROSCOPY LASER LITHOTRIPSY WITH STENT INSERTION Left 10/01/2021    Procedure: URETEROSCOPY WITH STENT PLACEMENT;  Surgeon: Milan Motley MD;  Location: [facility];  Service: Urology;  Laterality: Left;   • URETEROSCOPY LASER LITHOTRIPSY WITH STENT INSERTION Left 4/27/2022    Procedure: URETEROSCOPY STENT REMOVAL;  Surgeon: Milan Motley MD;  Location: [facility];  Service: Urology;  Laterality: Left;   • URETEROSCOPY LASER LITHOTRIPSY WITH STENT INSERTION Left 6/29/2022    Procedure: CYSTOSCOPY RETROGRADE LEFT WITH STENT PLACEMENT;  Surgeon: Milan Motley MD;  Location: [facility];  Service: Urology;  Laterality: Left;       Family History   Problem Relation Age of Onset   • Hypertension Mother    • Diabetes Father    • Hypertension Father    • Heart attack Father    • No Known Problems Sister    • No Known Problems Brother    • No Known Problems Son    • No Known Problems Daughter    • No Known Problems Maternal Grandmother    • No Known Problems Maternal Grandfather    • No Known Problems Paternal Grandmother    • No Known Problems Paternal Grandfather    • No Known Problems Cousin    • Rheum arthritis Neg Hx    • Osteoarthritis Neg Hx    • Asthma Neg Hx    • Heart failure Neg Hx    • Hyperlipidemia Neg Hx    • Migraines Neg Hx    • Rashes / Skin problems Neg Hx    • Seizures Neg Hx    • Stroke Neg Hx    • Thyroid disease Neg Hx        Social History     Socioeconomic History   • Marital status:    Tobacco Use   • Smoking status: Never Smoker   • Smokeless tobacco: Never Used   Vaping Use   • Vaping Use: Never used   Substance and Sexual Activity   • Alcohol use: No   • Drug use: Never      "Comment: Pt has track marks on right arm. Pt states \"It's from my INR being drawn.\"    • Sexual activity: Not Currently     Partners: Male     Birth control/protection: None           Objective   Physical Exam  Constitutional:       General: She is not in acute distress.     Appearance: Normal appearance. She is not ill-appearing.   HENT:      Head: Normocephalic and atraumatic.      Right Ear: External ear normal.      Left Ear: External ear normal.      Nose: Nose normal.      Mouth/Throat:      Mouth: Mucous membranes are moist.   Eyes:      Extraocular Movements: Extraocular movements intact.      Pupils: Pupils are equal, round, and reactive to light.   Cardiovascular:      Rate and Rhythm: Normal rate and regular rhythm.      Heart sounds: No murmur heard.  Pulmonary:      Effort: Pulmonary effort is normal. No respiratory distress.      Breath sounds: Normal breath sounds. No wheezing.   Abdominal:      General: Bowel sounds are normal.      Palpations: Abdomen is soft.      Tenderness: There is no abdominal tenderness.   Musculoskeletal:         General: No deformity or signs of injury. Normal range of motion.      Cervical back: Normal range of motion and neck supple.   Skin:     General: Skin is warm and dry.      Findings: No erythema.   Neurological:      General: No focal deficit present.      Mental Status: She is alert and oriented to person, place, and time. Mental status is at baseline.      Cranial Nerves: No cranial nerve deficit.   Psychiatric:         Mood and Affect: Mood normal.         Behavior: Behavior normal.         Thought Content: Thought content normal.         Procedures           ED Course  ED Course as of 08/16/22 0744   Tue Aug 16, 2022   0743 Patient continues to refuse to be seen by myself or receive care from myself.  I informed the patient that there are no other providers available at this point in time given it is shift change.  Continue to offer care to the patient.  Patient " informed the staff that they wished to leave against medical advice.  The risks of this decision were discussed throughly with the patient.  The risks included, but were not limited to worsening medical status, sepsis, shock, respiratory failure, severe neurological deficit, and cardiac death.  The patient expressed full understanding of the risk of this decision.  Patient was counseled to immediately return to the ER if symptoms worsened, and to follow up with their PCP promptly.  Vitals guarded at AMA. [SF]      ED Course User Index  [SF] David Mccoy DO                                           [facility]    Final diagnoses:   Generalized abdominal pain       ED Disposition  ED Disposition     ED Disposition   AMA    Condition   --    Comment   --             Eddie Latif, APRN  [address] [id number]  [phone]    In 1 week      [facility] Emergency Department  [address] [place] [address] [phone]    If symptoms worsen         Medication List      No changes were made to your prescriptions during this visit.          David Mccoy DO  08/16/22 0744

## 2022-08-16 NOTE — ED NOTES
"Pt states that she wants to leave AMA. She states \"I can't sit here and hurt, I will go home and look at my labs later and take them to my doctor\". Dr. Mccoy notified. Discussed benefits of services offered and risks of refusing services including possible death. Pt states that she understands but still wants to leave. Pt is ALOx4 with no acute distress noted.   "

## 2022-08-19 ENCOUNTER — HOSPITAL ENCOUNTER (EMERGENCY)
Facility: HOSPITAL | Age: 37
Discharge: HOME | End: 2022-08-19
Attending: STUDENT IN AN ORGANIZED HEALTH CARE EDUCATION/TRAINING PROGRAM | Admitting: STUDENT IN AN ORGANIZED HEALTH CARE EDUCATION/TRAINING PROGRAM

## 2022-08-19 ENCOUNTER — APPOINTMENT (OUTPATIENT)
Dept: CT IMAGING | Facility: HOSPITAL | Age: 37
End: 2022-08-19

## 2022-08-19 VITALS
BODY MASS INDEX: 47.46 KG/M2 | DIASTOLIC BLOOD PRESSURE: 96 MMHG | HEIGHT: 64 IN | OXYGEN SATURATION: 97 % | TEMPERATURE: 98.1 F | SYSTOLIC BLOOD PRESSURE: 127 MMHG | RESPIRATION RATE: 18 BRPM | WEIGHT: 278 LBS | HEART RATE: 109 BPM

## 2022-08-19 DIAGNOSIS — R10.9 RIGHT FLANK PAIN: Primary | ICD-10-CM

## 2022-08-19 LAB
A-A DO2: 5.1 MMHG (ref 0–300)
ACETONE BLD QL: NEGATIVE
ALBUMIN SERPL-MCNC: 4.39 G/DL (ref 3.5–5.2)
ALBUMIN/GLOB SERPL: 1.4 G/DL
ALP SERPL-CCNC: 123 U/L (ref 39–117)
ALT SERPL W P-5'-P-CCNC: 27 U/L (ref 1–33)
ANION GAP SERPL CALCULATED.3IONS-SCNC: 18.1 MMOL/L (ref 5–15)
APTT PPP: 28.6 SECONDS (ref 26.5–34.5)
ARTERIAL PATENCY WRIST A: POSITIVE
AST SERPL-CCNC: 43 U/L (ref 1–32)
ATMOSPHERIC PRESS: 728 MMHG
BACTERIA UR QL AUTO: ABNORMAL /HPF
BASE EXCESS BLDA CALC-SCNC: -2.7 MMOL/L (ref 0–2)
BASOPHILS # BLD AUTO: 0.06 10*3/MM3 (ref 0–0.2)
BASOPHILS NFR BLD AUTO: 0.9 % (ref 0–1.5)
BDY SITE: ABNORMAL
BILIRUB SERPL-MCNC: 0.3 MG/DL (ref 0–1.2)
BILIRUB UR QL STRIP: NEGATIVE
BODY TEMPERATURE: 0 C
BUN SERPL-MCNC: 16 MG/DL (ref 6–20)
BUN/CREAT SERPL: 20.5 (ref 7–25)
CALCIUM SPEC-SCNC: 9.8 MG/DL (ref 8.6–10.5)
CHLORIDE SERPL-SCNC: 100 MMOL/L (ref 98–107)
CLARITY UR: ABNORMAL
CO2 BLDA-SCNC: 23.2 MMOL/L (ref 22–33)
CO2 SERPL-SCNC: 17.9 MMOL/L (ref 22–29)
COHGB MFR BLD: 1.3 % (ref 0–5)
COLOR UR: YELLOW
CREAT SERPL-MCNC: 0.78 MG/DL (ref 0.57–1)
CRP SERPL-MCNC: 1.6 MG/DL (ref 0–0.5)
DEPRECATED RDW RBC AUTO: 39.9 FL (ref 37–54)
EGFRCR SERPLBLD CKD-EPI 2021: 101.1 ML/MIN/1.73
EOSINOPHIL # BLD AUTO: 0.85 10*3/MM3 (ref 0–0.4)
EOSINOPHIL NFR BLD AUTO: 12.2 % (ref 0.3–6.2)
ERYTHROCYTE [DISTWIDTH] IN BLOOD BY AUTOMATED COUNT: 12.7 % (ref 12.3–15.4)
GAS FLOW AIRWAY: 1.5 LPM
GLOBULIN UR ELPH-MCNC: 3.1 GM/DL
GLUCOSE SERPL-MCNC: 224 MG/DL (ref 65–99)
GLUCOSE UR STRIP-MCNC: ABNORMAL MG/DL
HCO3 BLDA-SCNC: 22.1 MMOL/L (ref 20–26)
HCT VFR BLD AUTO: 35.2 % (ref 34–46.6)
HCT VFR BLD CALC: 37.6 % (ref 38–51)
HGB BLD-MCNC: 12.1 G/DL (ref 12–15.9)
HGB BLDA-MCNC: 12.3 G/DL (ref 13.5–17.5)
HGB UR QL STRIP.AUTO: ABNORMAL
HOLD SPECIMEN: NORMAL
HOLD SPECIMEN: NORMAL
HYALINE CASTS UR QL AUTO: ABNORMAL /LPF
IMM GRANULOCYTES # BLD AUTO: 0.04 10*3/MM3 (ref 0–0.05)
IMM GRANULOCYTES NFR BLD AUTO: 0.6 % (ref 0–0.5)
INHALED O2 CONCENTRATION: 26 %
INR PPP: 0.94 (ref 0.9–1.1)
KETONES UR QL STRIP: NEGATIVE
LEUKOCYTE ESTERASE UR QL STRIP.AUTO: ABNORMAL
LIPASE SERPL-CCNC: 73 U/L (ref 13–60)
LYMPHOCYTES # BLD AUTO: 1.67 10*3/MM3 (ref 0.7–3.1)
LYMPHOCYTES NFR BLD AUTO: 23.9 % (ref 19.6–45.3)
Lab: ABNORMAL
MCH RBC QN AUTO: 30.2 PG (ref 26.6–33)
MCHC RBC AUTO-ENTMCNC: 34.4 G/DL (ref 31.5–35.7)
MCV RBC AUTO: 87.8 FL (ref 79–97)
METHGB BLD QL: 0 % (ref 0–3)
MODALITY: ABNORMAL
MONOCYTES # BLD AUTO: 0.45 10*3/MM3 (ref 0.1–0.9)
MONOCYTES NFR BLD AUTO: 6.4 % (ref 5–12)
NEUTROPHILS NFR BLD AUTO: 3.91 10*3/MM3 (ref 1.7–7)
NEUTROPHILS NFR BLD AUTO: 56 % (ref 42.7–76)
NITRITE UR QL STRIP: NEGATIVE
NOTE: ABNORMAL
NRBC BLD AUTO-RTO: 0 /100 WBC (ref 0–0.2)
OXYHGB MFR BLDV: 98.4 % (ref 94–99)
PCO2 BLDA: 37.6 MM HG (ref 35–45)
PCO2 TEMP ADJ BLD: ABNORMAL MM[HG]
PH BLDA: 7.38 PH UNITS (ref 7.35–7.45)
PH UR STRIP.AUTO: 5.5 [PH] (ref 5–8)
PH, TEMP CORRECTED: ABNORMAL
PLATELET # BLD AUTO: 224 10*3/MM3 (ref 140–450)
PMV BLD AUTO: 9.2 FL (ref 6–12)
PO2 BLDA: 130 MM HG (ref 83–108)
PO2 TEMP ADJ BLD: ABNORMAL MM[HG]
POTASSIUM SERPL-SCNC: 4.3 MMOL/L (ref 3.5–5.2)
PROT SERPL-MCNC: 7.5 G/DL (ref 6–8.5)
PROT UR QL STRIP: ABNORMAL
PROTHROMBIN TIME: 12.8 SECONDS (ref 12.1–14.7)
RBC # BLD AUTO: 4.01 10*6/MM3 (ref 3.77–5.28)
RBC # UR STRIP: ABNORMAL /HPF
REF LAB TEST METHOD: ABNORMAL
SAO2 % BLDCOA: >99.2 % (ref 94–99)
SODIUM SERPL-SCNC: 136 MMOL/L (ref 136–145)
SP GR UR STRIP: 1.02 (ref 1–1.03)
SQUAMOUS #/AREA URNS HPF: ABNORMAL /HPF
UROBILINOGEN UR QL STRIP: ABNORMAL
VENTILATOR MODE: ABNORMAL
WBC # UR STRIP: ABNORMAL /HPF
WBC NRBC COR # BLD: 6.98 10*3/MM3 (ref 3.4–10.8)
WHOLE BLOOD HOLD COAG: NORMAL
WHOLE BLOOD HOLD SPECIMEN: NORMAL
YEAST URNS QL MICRO: ABNORMAL /HPF

## 2022-08-19 PROCEDURE — 74178 CT ABD&PLV WO CNTR FLWD CNTR: CPT

## 2022-08-19 PROCEDURE — 85610 PROTHROMBIN TIME: CPT | Performed by: PHYSICIAN ASSISTANT

## 2022-08-19 PROCEDURE — 82805 BLOOD GASES W/O2 SATURATION: CPT

## 2022-08-19 PROCEDURE — 25010000002 IOPAMIDOL 61 % SOLUTION: Performed by: STUDENT IN AN ORGANIZED HEALTH CARE EDUCATION/TRAINING PROGRAM

## 2022-08-19 PROCEDURE — 82009 KETONE BODYS QUAL: CPT | Performed by: PHYSICIAN ASSISTANT

## 2022-08-19 PROCEDURE — 86140 C-REACTIVE PROTEIN: CPT | Performed by: PHYSICIAN ASSISTANT

## 2022-08-19 PROCEDURE — 36600 WITHDRAWAL OF ARTERIAL BLOOD: CPT

## 2022-08-19 PROCEDURE — 80053 COMPREHEN METABOLIC PANEL: CPT | Performed by: PHYSICIAN ASSISTANT

## 2022-08-19 PROCEDURE — 81001 URINALYSIS AUTO W/SCOPE: CPT | Performed by: PHYSICIAN ASSISTANT

## 2022-08-19 PROCEDURE — 74178 CT ABD&PLV WO CNTR FLWD CNTR: CPT | Performed by: RADIOLOGY

## 2022-08-19 PROCEDURE — 85025 COMPLETE CBC W/AUTO DIFF WBC: CPT | Performed by: PHYSICIAN ASSISTANT

## 2022-08-19 PROCEDURE — 85730 THROMBOPLASTIN TIME PARTIAL: CPT | Performed by: PHYSICIAN ASSISTANT

## 2022-08-19 PROCEDURE — 82375 ASSAY CARBOXYHB QUANT: CPT

## 2022-08-19 PROCEDURE — 87086 URINE CULTURE/COLONY COUNT: CPT | Performed by: PHYSICIAN ASSISTANT

## 2022-08-19 PROCEDURE — 83050 HGB METHEMOGLOBIN QUAN: CPT

## 2022-08-19 PROCEDURE — 99284 EMERGENCY DEPT VISIT MOD MDM: CPT

## 2022-08-19 PROCEDURE — 83690 ASSAY OF LIPASE: CPT | Performed by: PHYSICIAN ASSISTANT

## 2022-08-19 RX ORDER — SODIUM CHLORIDE 0.9 % (FLUSH) 0.9 %
10 SYRINGE (ML) INJECTION AS NEEDED
Status: DISCONTINUED | OUTPATIENT
Start: 2022-08-19 | End: 2022-08-19 | Stop reason: HOSPADM

## 2022-08-19 RX ADMIN — IOPAMIDOL 90 ML: 612 INJECTION, SOLUTION INTRAVENOUS at 12:03

## 2022-08-19 RX ADMIN — SODIUM CHLORIDE 1000 ML: 9 INJECTION, SOLUTION INTRAVENOUS at 11:33

## 2022-08-19 NOTE — ED PROVIDER NOTES
Subjective   37-year-old female who presents to the ED today for right flank pain.  She states that she is having right flank pain that radiates down into her groin with difficulty urinating.  She states this has been going on for couple days and is getting worse.  She states she is tried Tylenol at home with no relief.  She recently finished a course of IV Invanz 3 days ago.  This was initiated through the [facility].  She was seen there on August 11 and had a CT abdomen pelvis and a CT angio of her chest which were both unremarkable.  She denies any chest pain or difficulty breathing.  She denies any fever.  She denies any nausea or vomiting.      History provided by:  Patient  Flank Pain  Pain location:  R flank  Pain quality: sharp    Pain radiates to:  Groin  Pain severity:  Severe  Onset quality:  Gradual  Duration:  2 days  Timing:  Constant  Progression:  Worsening  Chronicity:  Recurrent  Relieved by:  Nothing  Worsened by:  Nothing  Ineffective treatments:  OTC medications  Associated symptoms: no anorexia, no chest pain, no chills, no constipation, no cough, no diarrhea, no dysuria, no fatigue, no fever, no hematemesis, no hematochezia, no hematuria, no melena, no nausea, no shortness of breath, no sore throat and no vomiting    Risk factors: obesity        Review of Systems   Constitutional: Negative.  Negative for chills, fatigue and fever.   HENT: Negative.  Negative for sore throat.    Eyes: Negative.    Respiratory: Negative for cough and shortness of breath.    Cardiovascular: Negative for chest pain.   Gastrointestinal: Negative for anorexia, constipation, diarrhea, hematemesis, hematochezia, melena, nausea and vomiting.   Genitourinary: Positive for difficulty urinating and flank pain. Negative for dysuria and hematuria.   Musculoskeletal: Positive for back pain.   Skin: Negative.    Neurological: Negative.    Psychiatric/Behavioral: Negative.    All other systems reviewed and are  negative.      Past Medical History:   Diagnosis Date   • A-fib (HCC)    • Abnormal ECG    • Anemia    • Anxiety    • Asthma    • Cancer (HCC)     Ovarian   • Depression    • Diabetes mellitus (HCC)    • DVT (deep venous thrombosis) (HCC)    • Factor 5 Leiden mutation, heterozygous (HCC)    • Fibroid    • GERD (gastroesophageal reflux disease)    • Gout    • H/O abdominal abscess    • History of sepsis    • History of transfusion    • Hyperlipidemia    • Hypothyroid    • Kidney stone    • Migraines    • Neuropathy    • Ovarian cancer (HCC) 2021   • Ovarian cyst    • PE (pulmonary embolism)    • Polycystic ovary syndrome    • Preeclampsia    • Rh incompatibility    • Stroke (HCC)    • TIA (transient ischemic attack)    • Urinary tract infection    • Varicella        Allergies   Allergen Reactions   • Toradol [Ketorolac Tromethamine] Anaphylaxis and Hives   • Haldol [Haloperidol] Hives and Mental Status Change   • Tramadol Hives and Swelling   • Amoxicillin Hives and Rash   • Penicillins Hives and Rash       Past Surgical History:   Procedure Laterality Date   • ABDOMINAL SURGERY     • CARDIAC CATHETERIZATION     •  SECTION     • CHOLECYSTECTOMY     • COLONOSCOPY     • ENDOSCOPY     • EXTRACORPOREAL SHOCK WAVE LITHOTRIPSY (ESWL) Left 10/22/2021    Procedure: EXTRACORPOREAL SHOCKWAVE LITHOTRIPSY;  Surgeon: Milan Motley MD;  Location: [facility];  Service: Urology;  Laterality: Left;   • LITHOTRIPSY     • RIGHT OOPHORECTOMY     • URETEROSCOPY LASER LITHOTRIPSY WITH STENT INSERTION Left 10/01/2021    Procedure: URETEROSCOPY WITH STENT PLACEMENT;  Surgeon: Milan Motley MD;  Location: [facility];  Service: Urology;  Laterality: Left;   • URETEROSCOPY LASER LITHOTRIPSY WITH STENT INSERTION Left 2022    Procedure: URETEROSCOPY STENT REMOVAL;  Surgeon: Milan Motley MD;  Location: [facility];  Service: Urology;  Laterality: Left;   • URETEROSCOPY LASER LITHOTRIPSY WITH STENT  "INSERTION Left 6/29/2022    Procedure: CYSTOSCOPY RETROGRADE LEFT WITH STENT PLACEMENT;  Surgeon: Milan Motley MD;  Location: [facility];  Service: Urology;  Laterality: Left;       Family History   Problem Relation Age of Onset   • Hypertension Mother    • Diabetes Father    • Hypertension Father    • Heart attack Father    • No Known Problems Sister    • No Known Problems Brother    • No Known Problems Son    • No Known Problems Daughter    • No Known Problems Maternal Grandmother    • No Known Problems Maternal Grandfather    • No Known Problems Paternal Grandmother    • No Known Problems Paternal Grandfather    • No Known Problems Cousin    • Rheum arthritis Neg Hx    • Osteoarthritis Neg Hx    • Asthma Neg Hx    • Heart failure Neg Hx    • Hyperlipidemia Neg Hx    • Migraines Neg Hx    • Rashes / Skin problems Neg Hx    • Seizures Neg Hx    • Stroke Neg Hx    • Thyroid disease Neg Hx        Social History     Socioeconomic History   • Marital status:    Tobacco Use   • Smoking status: Never Smoker   • Smokeless tobacco: Never Used   Vaping Use   • Vaping Use: Never used   Substance and Sexual Activity   • Alcohol use: No   • Drug use: Never     Comment: Pt has track marks on right arm. Pt states \"It's from my INR being drawn.\"    • Sexual activity: Not Currently     Partners: Male     Birth control/protection: None           Objective   Physical Exam  Vitals and nursing note reviewed.   Constitutional:       General: She is not in acute distress.     Appearance: Normal appearance. She is obese. She is not diaphoretic.   HENT:      Head: Normocephalic and atraumatic.      Right Ear: External ear normal.      Left Ear: External ear normal.   Eyes:      Conjunctiva/sclera: Conjunctivae normal.      Pupils: Pupils are equal, round, and reactive to light.   Cardiovascular:      Rate and Rhythm: Normal rate and regular rhythm.      Pulses: Normal pulses.      Heart sounds: Normal heart sounds. "   Pulmonary:      Effort: Pulmonary effort is normal.      Breath sounds: Normal breath sounds. No wheezing or rhonchi.   Chest:      Chest wall: No tenderness.   Abdominal:      General: Bowel sounds are normal.      Palpations: Abdomen is soft.      Tenderness: There is no abdominal tenderness. There is right CVA tenderness. There is no guarding or rebound.   Musculoskeletal:         General: Normal range of motion.      Cervical back: Normal range of motion and neck supple.   Skin:     General: Skin is warm and dry.      Capillary Refill: Capillary refill takes less than 2 seconds.   Neurological:      General: No focal deficit present.      Mental Status: She is alert and oriented to person, place, and time.   Psychiatric:         Mood and Affect: Mood normal.         Procedures         Results for orders placed or performed during the hospital encounter of 08/19/22   Comprehensive Metabolic Panel    Specimen: Arm, Right; Blood   Result Value Ref Range    Glucose 224 (H) 65 - 99 mg/dL    BUN 16 6 - 20 mg/dL    Creatinine 0.78 0.57 - 1.00 mg/dL    Sodium 136 136 - 145 mmol/L    Potassium 4.3 3.5 - 5.2 mmol/L    Chloride 100 98 - 107 mmol/L    CO2 17.9 (L) 22.0 - 29.0 mmol/L    Calcium 9.8 8.6 - 10.5 mg/dL    Total Protein 7.5 6.0 - 8.5 g/dL    Albumin 4.39 3.50 - 5.20 g/dL    ALT (SGPT) 27 1 - 33 U/L    AST (SGOT) 43 (H) 1 - 32 U/L    Alkaline Phosphatase 123 (H) 39 - 117 U/L    Total Bilirubin 0.3 0.0 - 1.2 mg/dL    Globulin 3.1 gm/dL    A/G Ratio 1.4 g/dL    BUN/Creatinine Ratio 20.5 7.0 - 25.0    Anion Gap 18.1 (H) 5.0 - 15.0 mmol/L    eGFR 101.1 >60.0 mL/min/1.73   Lipase    Specimen: Arm, Right; Blood   Result Value Ref Range    Lipase 73 (H) 13 - 60 U/L   Urinalysis With Culture If Indicated - Urine, Clean Catch    Specimen: Urine, Clean Catch   Result Value Ref Range    Color, UA Yellow Yellow, Straw    Appearance, UA Turbid (A) Clear    pH, UA 5.5 5.0 - 8.0    Specific Gravity, UA 1.022 1.005 - 1.030     Glucose,  mg/dL (Trace) (A) Negative    Ketones, UA Negative Negative    Bilirubin, UA Negative Negative    Blood, UA Moderate (2+) (A) Negative    Protein,  mg/dL (2+) (A) Negative    Leuk Esterase, UA Large (3+) (A) Negative    Nitrite, UA Negative Negative    Urobilinogen, UA 0.2 E.U./dL 0.2 - 1.0 E.U./dL   C-reactive Protein    Specimen: Arm, Right; Blood   Result Value Ref Range    C-Reactive Protein 1.60 (H) 0.00 - 0.50 mg/dL   Protime-INR    Specimen: Arm, Right; Blood   Result Value Ref Range    Protime 12.8 12.1 - 14.7 Seconds    INR 0.94 0.90 - 1.10   aPTT    Specimen: Arm, Right; Blood   Result Value Ref Range    PTT 28.6 26.5 - 34.5 seconds   CBC Auto Differential    Specimen: Arm, Right; Blood   Result Value Ref Range    WBC 6.98 3.40 - 10.80 10*3/mm3    RBC 4.01 3.77 - 5.28 10*6/mm3    Hemoglobin 12.1 12.0 - 15.9 g/dL    Hematocrit 35.2 34.0 - 46.6 %    MCV 87.8 79.0 - 97.0 fL    MCH 30.2 26.6 - 33.0 pg    MCHC 34.4 31.5 - 35.7 g/dL    RDW 12.7 12.3 - 15.4 %    RDW-SD 39.9 37.0 - 54.0 fl    MPV 9.2 6.0 - 12.0 fL    Platelets 224 140 - 450 10*3/mm3    Neutrophil % 56.0 42.7 - 76.0 %    Lymphocyte % 23.9 19.6 - 45.3 %    Monocyte % 6.4 5.0 - 12.0 %    Eosinophil % 12.2 (H) 0.3 - 6.2 %    Basophil % 0.9 0.0 - 1.5 %    Immature Grans % 0.6 (H) 0.0 - 0.5 %    Neutrophils, Absolute 3.91 1.70 - 7.00 10*3/mm3    Lymphocytes, Absolute 1.67 0.70 - 3.10 10*3/mm3    Monocytes, Absolute 0.45 0.10 - 0.90 10*3/mm3    Eosinophils, Absolute 0.85 (H) 0.00 - 0.40 10*3/mm3    Basophils, Absolute 0.06 0.00 - 0.20 10*3/mm3    Immature Grans, Absolute 0.04 0.00 - 0.05 10*3/mm3    nRBC 0.0 0.0 - 0.2 /100 WBC   Acetone    Specimen: Blood   Result Value Ref Range    Acetone Negative Negative   Blood Gas, Arterial With Co-Ox    Specimen: Arterial Blood   Result Value Ref Range    Site Left Radial     Apolinar's Test Positive     pH, Arterial 7.378 7.350 - 7.450 pH units    pCO2, Arterial 37.6 35.0 - 45.0 mm Hg    pO2,  Arterial 130.0 (H) 83.0 - 108.0 mm Hg    HCO3, Arterial 22.1 20.0 - 26.0 mmol/L    Base Excess, Arterial -2.7 (L) 0.0 - 2.0 mmol/L    O2 Saturation, Arterial >99.2 (H) 94.0 - 99.0 %    Hemoglobin, Blood Gas 12.3 (L) 13.5 - 17.5 g/dL    Hematocrit, Blood Gas 37.6 (L) 38.0 - 51.0 %    Oxyhemoglobin 98.4 94 - 99 %    Methemoglobin 0.00 0.00 - 3.00 %    Carboxyhemoglobin 1.3 0 - 5 %    A-a DO2 5.1 0.0 - 300.0 mmHg    CO2 Content 23.2 22 - 33 mmol/L    Temperature 0.0 C    Barometric Pressure for Blood Gas 728 mmHg    Modality Nasal Cannula     FIO2 26 %    Flow Rate 1.5 lpm    Ventilator Mode NA     Note      Collected by [id number]     pH, Temp Corrected      pCO2, Temperature Corrected      pO2, Temperature Corrected     Urinalysis, Microscopic Only - Urine, Clean Catch    Specimen: Urine, Clean Catch   Result Value Ref Range    RBC, UA 13-20 (A) None Seen, 0-2 /HPF    WBC, UA Too Numerous to Count (A) None Seen, 0-2 /HPF    Bacteria, UA Trace (A) None Seen /HPF    Squamous Epithelial Cells, UA 3-6 (A) None Seen, 0-2 /HPF    Yeast, UA Moderate/2+ Budding Yeast None Seen /HPF    Hyaline Casts, UA None Seen None Seen /LPF    Methodology Manual Light Microscopy    Green Top (Gel)   Result Value Ref Range    Extra Tube Hold for add-ons.    Lavender Top   Result Value Ref Range    Extra Tube hold for add-on    Gold Top - SST   Result Value Ref Range    Extra Tube Hold for add-ons.    Light Blue Top   Result Value Ref Range    Extra Tube Hold for add-ons.      ED Course  ED Course as of 08/19/22 1506   Fri Aug 19, 2022   1217 CT Abdomen Pelvis With & Without Contrast  IMPRESSION:  1.  Nonobstructing right kidney stones. No hydronephrosis.  2.  Stable appearance and positioning of left ureteral stent. No  left-sided hydronephrosis.  3.  Stable diffuse fatty infiltration of liver with hepatomegaly.  4.  Other incidental and nonacute findings as above. [AH]   1433 Patient just finished a course of IV Invanz earlier this week.  A  urine culture is pending today, this will be followed to see if she requires any further antibiotics.  She may have issues with chronic contamination due to her frequent infections.  She was afebrile here with a normal white blood cell count.  She will be able to be discharged home to follow-up outpatient.  She was instructed to return to the ED at anytime for symptoms change or worsen. [AH]      ED Course User Index  [AH] Elisha Tomas PA                                           MDM  Number of Diagnoses or Management Options     Amount and/or Complexity of Data Reviewed  Clinical lab tests: reviewed  Tests in the radiology section of CPT®: reviewed  Decide to obtain previous medical records or to obtain history from someone other than the patient: yes    Patient Progress  Patient progress: stable      Final diagnoses:   Right flank pain       ED Disposition  ED Disposition     ED Disposition   Discharge    Condition   Stable    Comment   --             Eddie Latif, APRN  [address] [facility] [address]  [phone]    Schedule an appointment as soon as possible for a visit in 3 days           Medication List      No changes were made to your prescriptions during this visit.          Elisha Tomas PA  08/19/22 0742

## 2022-08-20 LAB — BACTERIA SPEC AEROBE CULT: NORMAL

## 2022-08-24 ENCOUNTER — OFFICE VISIT (OUTPATIENT)
Dept: FAMILY MEDICINE CLINIC | Facility: CLINIC | Age: 37
End: 2022-08-24

## 2022-08-24 VITALS — WEIGHT: 278 LBS | HEIGHT: 64 IN | BODY MASS INDEX: 47.46 KG/M2

## 2022-08-24 DIAGNOSIS — J30.9 ALLERGIC RHINITIS, UNSPECIFIED SEASONALITY, UNSPECIFIED TRIGGER: ICD-10-CM

## 2022-08-24 DIAGNOSIS — E11.40 TYPE 2 DIABETES MELLITUS WITH DIABETIC NEUROPATHY, WITH LONG-TERM CURRENT USE OF INSULIN: Primary | Chronic | ICD-10-CM

## 2022-08-24 DIAGNOSIS — F33.0 MAJOR DEPRESSIVE DISORDER, RECURRENT EPISODE, MILD DEGREE: Chronic | ICD-10-CM

## 2022-08-24 DIAGNOSIS — E03.9 HYPOTHYROIDISM, UNSPECIFIED TYPE: Chronic | ICD-10-CM

## 2022-08-24 DIAGNOSIS — I10 PRIMARY HYPERTENSION: Chronic | ICD-10-CM

## 2022-08-24 DIAGNOSIS — K76.0 HEPATIC STEATOSIS: ICD-10-CM

## 2022-08-24 DIAGNOSIS — J45.909 MODERATE ASTHMA, UNSPECIFIED WHETHER COMPLICATED, UNSPECIFIED WHETHER PERSISTENT: Chronic | ICD-10-CM

## 2022-08-24 DIAGNOSIS — D68.51 FACTOR 5 LEIDEN MUTATION, HETEROZYGOUS: Chronic | ICD-10-CM

## 2022-08-24 DIAGNOSIS — E78.1 HYPERTRIGLYCERIDEMIA: Chronic | ICD-10-CM

## 2022-08-24 DIAGNOSIS — N20.0 RENAL CALCULUS, LEFT: ICD-10-CM

## 2022-08-24 DIAGNOSIS — M1A.9XX0 CHRONIC GOUT WITHOUT TOPHUS, UNSPECIFIED CAUSE, UNSPECIFIED SITE: Chronic | ICD-10-CM

## 2022-08-24 DIAGNOSIS — Z79.4 TYPE 2 DIABETES MELLITUS WITH DIABETIC NEUROPATHY, WITH LONG-TERM CURRENT USE OF INSULIN: Primary | Chronic | ICD-10-CM

## 2022-08-24 PROCEDURE — 99215 OFFICE O/P EST HI 40 MIN: CPT | Performed by: NURSE PRACTITIONER

## 2022-08-24 RX ORDER — LANCETS 33 GAUGE
1 EACH MISCELLANEOUS 3 TIMES DAILY
Qty: 100 EACH | Refills: 1 | Status: SHIPPED | OUTPATIENT
Start: 2022-08-24 | End: 2022-12-01 | Stop reason: SDUPTHER

## 2022-08-24 RX ORDER — ALBUTEROL SULFATE 90 UG/1
2 AEROSOL, METERED RESPIRATORY (INHALATION) EVERY 4 HOURS PRN
Qty: 18 G | Refills: 2 | Status: SHIPPED | OUTPATIENT
Start: 2022-08-24 | End: 2022-12-01 | Stop reason: SDUPTHER

## 2022-08-24 RX ORDER — INSULIN GLARGINE 100 [IU]/ML
52 INJECTION, SOLUTION SUBCUTANEOUS DAILY
Qty: 5 PEN | Refills: 2 | Status: SHIPPED | OUTPATIENT
Start: 2022-08-24 | End: 2022-12-01 | Stop reason: SDUPTHER

## 2022-08-24 RX ORDER — SEMAGLUTIDE 1.34 MG/ML
1 INJECTION, SOLUTION SUBCUTANEOUS WEEKLY
COMMUNITY
End: 2022-08-24

## 2022-08-24 RX ORDER — AZELASTINE 1 MG/ML
SPRAY, METERED NASAL
Qty: 1 EACH | Refills: 2 | Status: SHIPPED | OUTPATIENT
Start: 2022-08-24 | End: 2022-12-01 | Stop reason: SDUPTHER

## 2022-08-24 RX ORDER — ALLOPURINOL 100 MG/1
100 TABLET ORAL DAILY
Qty: 30 TABLET | Refills: 2 | Status: SHIPPED | OUTPATIENT
Start: 2022-08-24 | End: 2022-12-01 | Stop reason: SDUPTHER

## 2022-08-24 RX ORDER — METOPROLOL SUCCINATE 50 MG/1
50 TABLET, EXTENDED RELEASE ORAL DAILY
Qty: 30 TABLET | Refills: 2 | Status: SHIPPED | OUTPATIENT
Start: 2022-08-24 | End: 2022-12-01 | Stop reason: SDUPTHER

## 2022-08-24 RX ORDER — AMLODIPINE BESYLATE 10 MG/1
10 TABLET ORAL DAILY
Qty: 30 TABLET | Refills: 2 | Status: SHIPPED | OUTPATIENT
Start: 2022-08-24 | End: 2022-12-01 | Stop reason: SDUPTHER

## 2022-08-24 RX ORDER — DULOXETIN HYDROCHLORIDE 20 MG/1
20 CAPSULE, DELAYED RELEASE ORAL DAILY
Qty: 30 CAPSULE | Refills: 2 | Status: SHIPPED | OUTPATIENT
Start: 2022-08-24 | End: 2022-12-01 | Stop reason: SDUPTHER

## 2022-08-24 NOTE — PROGRESS NOTES
You have chosen to receive care through a telehealth visit.  Do you consent to use a video/audio connection for your medical care today? Yes   .  History of Present Illness  Natalia Arzate is a 37 y.o. female who presents to the clinic where I am located via video during the COVID-19 pandemic/ federally declared state of public health emergency.  This service was conducted via [employer].  Natalia is unable to present to the office today due to recent hospitalization and mobility limitations. In addition, she has cough today.  Natalia was hospitalization at [facility] in early August due to a severe urinary tract infection.  She was discharged with a PICC line with IV antibiotics which home health has been assisting her with these have been discontinued.  Noted during her hospitalization on CT of the abdomen a liver lesion was noted.  This is to be followed up with  Hepatologist, Jeffery Metz, Hepatologist [facility] on September 28, 2022.  In addition at that time she is scheduled for imaging.  She has follow-up with Tien Price, urologist on September 28, 2022 at [facility].    Diabetes  She has type 2 diabetes mellitus treated with insulin both basal and fast acting. She is using a DexCom which is helping her.  Risk factors for coronary artery disease include diabetes mellitus, dyslipidemia, hypertension, obesity, stress and sedentary lifestyle.     Lab Results   Component Value Date    HGBA1C 5.60 04/10/2022     Lab Results   Component Value Date    HGBA1C 8.50 (H) 07/14/2022     Hypertension  Currently taking Amlodipine and Metoprolol.    Lab Results   Component Value Date    GLUCOSE 224 (H) 08/19/2022    BUN 16 08/19/2022    CREATININE 0.78 08/19/2022    BCR 20.5 08/19/2022    K 4.3 08/19/2022    CO2 17.9 (L) 08/19/2022    CALCIUM 9.8 08/19/2022    ALBUMIN 4.39 08/19/2022    AST 43 (H) 08/19/2022    ALT 27 08/19/2022     Dyslipidemia  Prescribed Omega Lipid panel is due    Lab Results    Component Value Date    TRIG 373 (H) 01/05/2022    TRIG 282 (H) 01/03/2022    TRIG 434 (H) 12/30/2021     Hypothyroidism  Had previously been prescribed Levothyroxine but her last TSH was in range without medication  Lab Results   Component Value Date    TSH 4.040 04/04/2022     Kidney Disease  Previous Hydronephrosis and obstructing stone as well as nonobstructing renal stones.  She has had multiple procedures per urologist, Dr. Motley and  medical team.  Recent hospitalization due to UTI with ESBL Klebsiella pneumonia which was treated with IV antibiotics.  Is to be followed up with Dr. Tien Price in September at [facility]    Back Pain  This is a recurrent problem. Shares she continues to use her walker for support during ambulation.  She is unable to go upstairs to her home without great difficulty.    The following portions of the patient's history were reviewed and updated as appropriate: allergies, current medications, past family history, past medical history, past social history, past surgical history and problem list.    Review of Systems   Constitutional: Positive for fatigue. Negative for activity change, appetite change, chills, fever and unexpected weight change.   HENT: Positive for congestion.    Eyes: Negative for visual disturbance.   Respiratory: Positive for cough. Negative for shortness of breath and wheezing.    Cardiovascular: Negative for chest pain, palpitations and leg swelling.   Gastrointestinal: Positive for nausea. Negative for abdominal pain, constipation, diarrhea and vomiting.   Endocrine: Negative for cold intolerance, heat intolerance, polydipsia, polyphagia and polyuria.   Musculoskeletal: Positive for arthralgias, back pain and gait problem.   Skin: Negative for color change and rash.   Allergic/Immunologic: Positive for environmental allergies.   Neurological: Positive for headaches. Negative for dizziness, tremors, speech difficulty, weakness and  "light-headedness.   Hematological: Negative for adenopathy.   Psychiatric/Behavioral: Negative for confusion and decreased concentration. The patient is not nervous/anxious.    All other systems reviewed and are negative.    Vital signs:  Ht 162.6 cm (64.02\")   Wt 126 kg (278 lb)   LMP 10/31/2017   BMI 47.69 kg/m²   This is a video visit    Physical Exam  Constitutional:       General: She is not in acute distress.  HENT:      Head: Normocephalic.      Nose: Congestion present.   Eyes:      General: No scleral icterus.        Right eye: No discharge.         Left eye: No discharge.      Conjunctiva/sclera: Conjunctivae normal.   Pulmonary:      Effort: No respiratory distress.   Musculoskeletal:      Cervical back: Neck supple.   Neurological:      Mental Status: She is alert and oriented to person, place, and time.   Psychiatric:         Mood and Affect: Mood and affect normal.         Speech: Speech normal.         Behavior: Behavior is cooperative.         Cognition and Memory: Cognition and memory normal.       Assessment & Plan     Diagnoses and all orders for this visit:    1. Type 2 diabetes mellitus with diabetic neuropathy, with long-term current use of insulin ([facility]) (Primary)  Comments:  Increase Lantus to 52 units daily and increase every 3 to 4 days x 2 to 3 units until she achieves her goal of a fasting blood sugar of less 130 mg/dL  Orders:  -     Semaglutide, 2 MG/DOSE, 8 MG/3ML solution pen-injector; Inject 2 mg under the skin into the appropriate area as directed 1 (One) Time Per Week.  Dispense: 1 pen; Refill: 0  -     glucose blood test strip; 1 each by Other route 3 (Three) Times a Day.  Dispense: 100 each; Refill: 5  -     Lancets Micro Thin 33G misc; 1 Device 3 (Three) Times a Day.  Dispense: 100 each; Refill: 1  -     Insulin Pen Needle 30G X 8 MM misc; 1 Device Daily.  Dispense: 100 each; Refill: 0  -     Insulin Glargine (Lantus SoloStar) 100 UNIT/ML injection pen; Inject 52 Units under " the skin into the appropriate area as directed Daily.  Dispense: 5 pen; Refill: 2    2. Primary hypertension  Comments:  Continue Amlodipine and Metoprolol.  Will consider ARB/ACEI   Orders:  -     amLODIPine (NORVASC) 10 MG tablet; Take 1 tablet by mouth Daily.  Dispense: 30 tablet; Refill: 2  -     metoprolol succinate XL (TOPROL-XL) 50 MG 24 hr tablet; Take 1 tablet by mouth Daily.  Dispense: 30 tablet; Refill: 2    3. Hypertriglyceridemia  Comments:  Repeat lipid panel to assess    4. Hypothyroidism, unspecified type  Comments:  Recent TSH has been within range without levothyroxine will continue to monitor    5. Factor 5 Leiden mutation, heterozygous (HCC)  Comments:  Continue Eliquis.  Follow-up with Hematology   Orders:  -     apixaban (ELIQUIS) 5 MG tablet tablet; Take 1 tablet by mouth 2 (Two) Times a Day.  Dispense: 60 tablet; Refill: 2    6. Moderate asthma, unspecified whether complicated, unspecified whether persistent  Comments:  Continue Alb Inhaler prn, Singulair. Avoidance of triggers   Orders:  -     albuterol sulfate  (90 Base) MCG/ACT inhaler; Inhale 2 puffs Every 4 (Four) Hours As Needed for Wheezing.  Dispense: 18 g; Refill: 2    7. Chronic gout without tophus, unspecified cause, unspecified site  Comments:  Continue allopurinol   Orders:  -     allopurinol (ZYLOPRIM) 100 MG tablet; Take 1 tablet by mouth Daily.  Dispense: 30 tablet; Refill: 2    8. Major depressive disorder, recurrent episode, mild degree (HCC)  Comments:  Responding well to Cymbalta  Orders:  -     DULoxetine (CYMBALTA) 20 MG capsule; Take 1 capsule by mouth Daily.  Dispense: 30 capsule; Refill: 2    9. Allergic rhinitis, unspecified seasonality, unspecified trigger  Comments:  Astelin Nasal Spray   Orders:  -     azelastine (ASTELIN) 0.1 % nasal spray; 2 sprays both nostrils twice daily as needed  Dispense: 1 each; Refill: 2    10. Hepatic steatosis  Comments:  Has evaluation per hepatologist in September 11. Renal  calculus, left  Comments:  Follow-up with urologist Dr. Motley and Dr. Tien Shin    I spent 40 minutes caring for Natalia on this date of service. This time includes time spent by me in the following activities:preparing for the visit, reviewing tests, obtaining and/or reviewing a separately obtained history, counseling and educating the patient/family/caregiver, ordering medications, tests, or procedures, referring and communicating with other health care professionals , documenting information in the medical record, independently interpreting results and communicating that information with the patient/family/caregiver and care coordination  Follow Up In September  Findings and recommendations discussed with Nataliagraham Arzate. Reviewed test results. Lifestyle modifications reinforced including nutrition and activity recommendations. Will follow up in days sooner if problems/concerns occur.  Patient was given instructions and counseling regarding his condition or for health maintenance advice. Please see specific information pulled into the AVS if appropriate    This document has been electronically signed by:

## 2022-08-27 PROBLEM — K76.0 HEPATIC STEATOSIS: Status: ACTIVE | Noted: 2022-08-27

## 2022-08-31 DIAGNOSIS — Z79.4 TYPE 2 DIABETES MELLITUS WITH DIABETIC NEUROPATHY, WITH LONG-TERM CURRENT USE OF INSULIN: Primary | ICD-10-CM

## 2022-08-31 DIAGNOSIS — E11.40 TYPE 2 DIABETES MELLITUS WITH DIABETIC NEUROPATHY, WITH LONG-TERM CURRENT USE OF INSULIN: Primary | ICD-10-CM

## 2022-08-31 RX ORDER — GABAPENTIN 300 MG/1
300 CAPSULE ORAL 2 TIMES DAILY
Qty: 60 CAPSULE | Refills: 0 | Status: SHIPPED | OUTPATIENT
Start: 2022-08-31 | End: 2022-10-13 | Stop reason: SDUPTHER

## 2022-09-11 ENCOUNTER — HOSPITAL ENCOUNTER (EMERGENCY)
Facility: HOSPITAL | Age: 37
Discharge: HOME | End: 2022-09-11
Attending: STUDENT IN AN ORGANIZED HEALTH CARE EDUCATION/TRAINING PROGRAM | Admitting: STUDENT IN AN ORGANIZED HEALTH CARE EDUCATION/TRAINING PROGRAM

## 2022-09-11 ENCOUNTER — APPOINTMENT (OUTPATIENT)
Dept: CT IMAGING | Facility: HOSPITAL | Age: 37
End: 2022-09-11

## 2022-09-11 VITALS
HEART RATE: 78 BPM | WEIGHT: 280 LBS | HEIGHT: 64 IN | BODY MASS INDEX: 47.8 KG/M2 | TEMPERATURE: 98.5 F | DIASTOLIC BLOOD PRESSURE: 100 MMHG | SYSTOLIC BLOOD PRESSURE: 150 MMHG | RESPIRATION RATE: 18 BRPM | OXYGEN SATURATION: 98 %

## 2022-09-11 DIAGNOSIS — T83.9XXA: Primary | ICD-10-CM

## 2022-09-11 DIAGNOSIS — N39.0 ACUTE UTI: ICD-10-CM

## 2022-09-11 LAB
ALBUMIN SERPL-MCNC: 4.64 G/DL (ref 3.5–5.2)
ALBUMIN/GLOB SERPL: 1.4 G/DL
ALP SERPL-CCNC: 128 U/L (ref 39–117)
ALT SERPL W P-5'-P-CCNC: 37 U/L (ref 1–33)
ANION GAP SERPL CALCULATED.3IONS-SCNC: 17.4 MMOL/L (ref 5–15)
AST SERPL-CCNC: 47 U/L (ref 1–32)
BACTERIA UR QL AUTO: ABNORMAL /HPF
BASOPHILS # BLD AUTO: 0.04 10*3/MM3 (ref 0–0.2)
BASOPHILS NFR BLD AUTO: 0.6 % (ref 0–1.5)
BILIRUB SERPL-MCNC: 0.3 MG/DL (ref 0–1.2)
BILIRUB UR QL STRIP: NEGATIVE
BUN SERPL-MCNC: 18 MG/DL (ref 6–20)
BUN/CREAT SERPL: 24 (ref 7–25)
CALCIUM SPEC-SCNC: 10.1 MG/DL (ref 8.6–10.5)
CHLORIDE SERPL-SCNC: 96 MMOL/L (ref 98–107)
CLARITY UR: ABNORMAL
CO2 SERPL-SCNC: 18.6 MMOL/L (ref 22–29)
COLOR UR: YELLOW
CREAT SERPL-MCNC: 0.75 MG/DL (ref 0.57–1)
DEPRECATED RDW RBC AUTO: 43.2 FL (ref 37–54)
EGFRCR SERPLBLD CKD-EPI 2021: 106 ML/MIN/1.73
EOSINOPHIL # BLD AUTO: 0.4 10*3/MM3 (ref 0–0.4)
EOSINOPHIL NFR BLD AUTO: 6 % (ref 0.3–6.2)
ERYTHROCYTE [DISTWIDTH] IN BLOOD BY AUTOMATED COUNT: 13.7 % (ref 12.3–15.4)
GLOBULIN UR ELPH-MCNC: 3.3 GM/DL
GLUCOSE SERPL-MCNC: 347 MG/DL (ref 65–99)
GLUCOSE UR STRIP-MCNC: ABNORMAL MG/DL
HCG SERPL QL: NEGATIVE
HCT VFR BLD AUTO: 35.2 % (ref 34–46.6)
HGB BLD-MCNC: 12.1 G/DL (ref 12–15.9)
HGB UR QL STRIP.AUTO: ABNORMAL
HYALINE CASTS UR QL AUTO: ABNORMAL /LPF
IMM GRANULOCYTES # BLD AUTO: 0.02 10*3/MM3 (ref 0–0.05)
IMM GRANULOCYTES NFR BLD AUTO: 0.3 % (ref 0–0.5)
KETONES UR QL STRIP: ABNORMAL
LEUKOCYTE ESTERASE UR QL STRIP.AUTO: ABNORMAL
LYMPHOCYTES # BLD AUTO: 1.2 10*3/MM3 (ref 0.7–3.1)
LYMPHOCYTES NFR BLD AUTO: 18 % (ref 19.6–45.3)
MCH RBC QN AUTO: 30.4 PG (ref 26.6–33)
MCHC RBC AUTO-ENTMCNC: 34.4 G/DL (ref 31.5–35.7)
MCV RBC AUTO: 88.4 FL (ref 79–97)
MONOCYTES # BLD AUTO: 0.42 10*3/MM3 (ref 0.1–0.9)
MONOCYTES NFR BLD AUTO: 6.3 % (ref 5–12)
NEUTROPHILS NFR BLD AUTO: 4.57 10*3/MM3 (ref 1.7–7)
NEUTROPHILS NFR BLD AUTO: 68.8 % (ref 42.7–76)
NITRITE UR QL STRIP: NEGATIVE
NRBC BLD AUTO-RTO: 0 /100 WBC (ref 0–0.2)
PH UR STRIP.AUTO: 5.5 [PH] (ref 5–8)
PLATELET # BLD AUTO: 180 10*3/MM3 (ref 140–450)
PMV BLD AUTO: 9 FL (ref 6–12)
POTASSIUM SERPL-SCNC: 4.3 MMOL/L (ref 3.5–5.2)
PROT SERPL-MCNC: 7.9 G/DL (ref 6–8.5)
PROT UR QL STRIP: ABNORMAL
RBC # BLD AUTO: 3.98 10*6/MM3 (ref 3.77–5.28)
RBC # UR STRIP: ABNORMAL /HPF
REF LAB TEST METHOD: ABNORMAL
SODIUM SERPL-SCNC: 132 MMOL/L (ref 136–145)
SP GR UR STRIP: 1.02 (ref 1–1.03)
SQUAMOUS #/AREA URNS HPF: ABNORMAL /HPF
UROBILINOGEN UR QL STRIP: ABNORMAL
WBC # UR STRIP: ABNORMAL /HPF
WBC NRBC COR # BLD: 6.65 10*3/MM3 (ref 3.4–10.8)
YEAST URNS QL MICRO: ABNORMAL /HPF

## 2022-09-11 PROCEDURE — 85025 COMPLETE CBC W/AUTO DIFF WBC: CPT | Performed by: STUDENT IN AN ORGANIZED HEALTH CARE EDUCATION/TRAINING PROGRAM

## 2022-09-11 PROCEDURE — 84703 CHORIONIC GONADOTROPIN ASSAY: CPT | Performed by: STUDENT IN AN ORGANIZED HEALTH CARE EDUCATION/TRAINING PROGRAM

## 2022-09-11 PROCEDURE — 81001 URINALYSIS AUTO W/SCOPE: CPT | Performed by: STUDENT IN AN ORGANIZED HEALTH CARE EDUCATION/TRAINING PROGRAM

## 2022-09-11 PROCEDURE — 36415 COLL VENOUS BLD VENIPUNCTURE: CPT

## 2022-09-11 PROCEDURE — 25010000002 IOPAMIDOL 61 % SOLUTION: Performed by: STUDENT IN AN ORGANIZED HEALTH CARE EDUCATION/TRAINING PROGRAM

## 2022-09-11 PROCEDURE — 87086 URINE CULTURE/COLONY COUNT: CPT | Performed by: STUDENT IN AN ORGANIZED HEALTH CARE EDUCATION/TRAINING PROGRAM

## 2022-09-11 PROCEDURE — 80053 COMPREHEN METABOLIC PANEL: CPT | Performed by: STUDENT IN AN ORGANIZED HEALTH CARE EDUCATION/TRAINING PROGRAM

## 2022-09-11 PROCEDURE — 99284 EMERGENCY DEPT VISIT MOD MDM: CPT

## 2022-09-11 PROCEDURE — 74177 CT ABD & PELVIS W/CONTRAST: CPT

## 2022-09-11 RX ORDER — CEFDINIR 300 MG/1
300 CAPSULE ORAL 2 TIMES DAILY
Qty: 14 CAPSULE | Refills: 0 | Status: SHIPPED | OUTPATIENT
Start: 2022-09-11 | End: 2022-09-18

## 2022-09-11 RX ORDER — IBUPROFEN 400 MG/1
800 TABLET ORAL ONCE
Status: COMPLETED | OUTPATIENT
Start: 2022-09-11 | End: 2022-09-11

## 2022-09-11 RX ORDER — ACETAMINOPHEN 500 MG
1000 TABLET ORAL ONCE
Status: COMPLETED | OUTPATIENT
Start: 2022-09-11 | End: 2022-09-11

## 2022-09-11 RX ADMIN — IBUPROFEN 800 MG: 400 TABLET, FILM COATED ORAL at 09:19

## 2022-09-11 RX ADMIN — IOPAMIDOL 85 ML: 612 INJECTION, SOLUTION INTRAVENOUS at 09:37

## 2022-09-11 RX ADMIN — ACETAMINOPHEN 1000 MG: 500 TABLET ORAL at 08:46

## 2022-09-11 NOTE — DISCHARGE INSTRUCTIONS
Please follow up with urology, Dr. Motley office tomorrow. If you are unable to urinate in 6-8 hours please return to emergency department.

## 2022-09-12 LAB — BACTERIA SPEC AEROBE CULT: NORMAL

## 2022-09-15 NOTE — ED PROVIDER NOTES
Subjective   History of Present Illness     Mrs. Arzate is a 37-year-old female past medical history for a failed, morbid obesity, ovarian cancer, diabetes, factor V [place] presenting to the emergency department for urinary stent complication.  Patient reports due to left nephrolithiasis she had a urinary stent placed months ago however she had a mechanical fall from standing earlier today and smacked the record coming from her vaginal area.  Patient reports since then she has had severe left lower quadrant abdominal pain.  Patient also reports she has been unable to urinate since symptoms started.  Patient has been seen in the emergency department multiple other times for similar occurrences.  Patient denies hitting her head, negative loss of consciousness denies neck pain, back pain, chest pain or other extremity pain at this time.    Review of Systems   Constitutional: Negative.  Negative for fever.   HENT: Negative.    Respiratory: Negative.    Cardiovascular: Negative.  Negative for chest pain.   Gastrointestinal: Positive for abdominal pain.   Endocrine: Negative.    Genitourinary: Negative.  Negative for dysuria.   Skin: Negative.    Neurological: Negative.    Psychiatric/Behavioral: Negative.    All other systems reviewed and are negative.      Past Medical History:   Diagnosis Date   • A-fib (HCC)    • Abnormal ECG    • Anemia    • Anxiety    • Asthma    • Cancer (HCC)     Ovarian   • Depression    • Diabetes mellitus (HCC)    • DVT (deep venous thrombosis) (HCC)    • Factor 5 Leiden mutation, heterozygous (HCC)    • Fibroid    • GERD (gastroesophageal reflux disease)    • Gout    • H/O abdominal abscess    • History of sepsis    • History of transfusion    • Hyperlipidemia    • Hypothyroid    • Kidney stone    • Migraines    • Neuropathy    • Ovarian cancer (HCC) 02/2021   • Ovarian cyst    • PE (pulmonary embolism)    • Polycystic ovary syndrome    • Preeclampsia    • Rh incompatibility    • Stroke (HCC)     • TIA (transient ischemic attack)    • Urinary tract infection    • Varicella        Allergies   Allergen Reactions   • Toradol [Ketorolac Tromethamine] Anaphylaxis and Hives   • Haldol [Haloperidol] Hives and Mental Status Change   • Tramadol Hives and Swelling   • Amoxicillin Hives and Rash   • Penicillins Hives and Rash       Past Surgical History:   Procedure Laterality Date   • ABDOMINAL SURGERY     • CARDIAC CATHETERIZATION     •  SECTION     • CHOLECYSTECTOMY     • COLONOSCOPY     • ENDOSCOPY     • EXTRACORPOREAL SHOCK WAVE LITHOTRIPSY (ESWL) Left 10/22/2021    Procedure: EXTRACORPOREAL SHOCKWAVE LITHOTRIPSY;  Surgeon: Milan Motley MD;  Location: [facility];  Service: Urology;  Laterality: Left;   • LITHOTRIPSY     • RIGHT OOPHORECTOMY     • URETEROSCOPY LASER LITHOTRIPSY WITH STENT INSERTION Left 10/01/2021    Procedure: URETEROSCOPY WITH STENT PLACEMENT;  Surgeon: Milan Motley MD;  Location: [facility];  Service: Urology;  Laterality: Left;   • URETEROSCOPY LASER LITHOTRIPSY WITH STENT INSERTION Left 2022    Procedure: URETEROSCOPY STENT REMOVAL;  Surgeon: Milan Motley MD;  Location: [facility];  Service: Urology;  Laterality: Left;   • URETEROSCOPY LASER LITHOTRIPSY WITH STENT INSERTION Left 2022    Procedure: CYSTOSCOPY RETROGRADE LEFT WITH STENT PLACEMENT;  Surgeon: Milan Motley MD;  Location: [facility];  Service: Urology;  Laterality: Left;       Family History   Problem Relation Age of Onset   • Hypertension Mother    • Diabetes Father    • Hypertension Father    • Heart attack Father    • No Known Problems Sister    • No Known Problems Brother    • No Known Problems Son    • No Known Problems Daughter    • No Known Problems Maternal Grandmother    • No Known Problems Maternal Grandfather    • No Known Problems Paternal Grandmother    • No Known Problems Paternal Grandfather    • No Known Problems Cousin    • Rheum arthritis Neg Hx    •  "Osteoarthritis Neg Hx    • Asthma Neg Hx    • Heart failure Neg Hx    • Hyperlipidemia Neg Hx    • Migraines Neg Hx    • Rashes / Skin problems Neg Hx    • Seizures Neg Hx    • Stroke Neg Hx    • Thyroid disease Neg Hx        Social History     Socioeconomic History   • Marital status:    Tobacco Use   • Smoking status: Never Smoker   • Smokeless tobacco: Never Used   Vaping Use   • Vaping Use: Never used   Substance and Sexual Activity   • Alcohol use: No   • Drug use: Never     Comment: Pt has track marks on right arm. Pt states \"It's from my INR being drawn.\"    • Sexual activity: Not Currently     Partners: Male     Birth control/protection: None           Objective   Physical Exam  Vitals and nursing note reviewed.   Constitutional:       General: She is not in acute distress.     Appearance: Normal appearance. She is not ill-appearing.   HENT:      Head: Normocephalic and atraumatic.      Right Ear: Tympanic membrane normal.      Left Ear: Tympanic membrane normal.      Nose: Nose normal. No congestion.      Mouth/Throat:      Mouth: Mucous membranes are moist.      Pharynx: No oropharyngeal exudate.   Eyes:      Extraocular Movements: Extraocular movements intact.      Pupils: Pupils are equal, round, and reactive to light.   Cardiovascular:      Rate and Rhythm: Normal rate and regular rhythm.      Pulses: Normal pulses.      Heart sounds: Normal heart sounds.   Pulmonary:      Effort: Pulmonary effort is normal.      Breath sounds: Normal breath sounds.   Abdominal:      General: Abdomen is flat. Bowel sounds are normal. There is no distension.   Musculoskeletal:         General: No swelling. Normal range of motion.      Cervical back: Normal range of motion. No rigidity.   Skin:     General: Skin is warm.      Capillary Refill: Capillary refill takes less than 2 seconds.      Coloration: Skin is not jaundiced.   Neurological:      General: No focal deficit present.      Mental Status: She is alert " and oriented to person, place, and time.         Procedures           ED Course                                           MDM  Number of Diagnoses or Management Options     Amount and/or Complexity of Data Reviewed  Clinical lab tests: reviewed and ordered  Tests in the radiology section of CPT®: reviewed and ordered  Tests in the medicine section of CPT®: ordered and reviewed  Review and summarize past medical records: yes  Independent visualization of images, tracings, or specimens: yes    Risk of Complications, Morbidity, and/or Mortality  Presenting problems: low  Diagnostic procedures: low  Management options: low        Final diagnoses:   Complication of urinary stent, initial encounter (HCC)   Acute UTI       ED Disposition  ED Disposition     ED Disposition   Discharge    Condition   Stable    Comment   --             Eddie Latif, APRN  [address] [facility] [address] [phone]    Go in 2 days  Medical evaluation         Medication List      New Prescriptions    cefdinir 300 MG capsule  Commonly known as: OMNICEF  Take 1 capsule by mouth 2 (Two) Times a Day for 7 days.           Where to Get Your Medications      These medications were sent to [facility] - [place], [place] - [address] [place] [address] - [phone] [facility] [phone] [address] [address]    Phone: [phone]   · cefdinir 300 MG capsule          Mary Thao MD  09/15/22 1593

## 2022-09-21 ENCOUNTER — APPOINTMENT (OUTPATIENT)
Dept: ONCOLOGY | Facility: HOSPITAL | Age: 37
End: 2022-09-21

## 2022-09-21 ENCOUNTER — TRANSCRIBE ORDERS (OUTPATIENT)
Dept: ONCOLOGY | Facility: HOSPITAL | Age: 37
End: 2022-09-21

## 2022-09-23 ENCOUNTER — INFUSION (OUTPATIENT)
Dept: ONCOLOGY | Facility: HOSPITAL | Age: 37
End: 2022-09-23

## 2022-09-23 VITALS
TEMPERATURE: 97.1 F | SYSTOLIC BLOOD PRESSURE: 145 MMHG | RESPIRATION RATE: 18 BRPM | HEART RATE: 85 BPM | OXYGEN SATURATION: 94 % | DIASTOLIC BLOOD PRESSURE: 98 MMHG

## 2022-09-23 LAB
ALBUMIN SERPL-MCNC: 4.11 G/DL (ref 3.5–5.2)
ALBUMIN/GLOB SERPL: 1.3 G/DL
ALP SERPL-CCNC: 114 U/L (ref 39–117)
ALT SERPL W P-5'-P-CCNC: 19 U/L (ref 1–33)
ANION GAP SERPL CALCULATED.3IONS-SCNC: 12.7 MMOL/L (ref 5–15)
AST SERPL-CCNC: 36 U/L (ref 1–32)
BASOPHILS # BLD AUTO: 0.03 10*3/MM3 (ref 0–0.2)
BASOPHILS NFR BLD AUTO: 0.5 % (ref 0–1.5)
BILIRUB CONJ SERPL-MCNC: <0.2 MG/DL (ref 0–0.3)
BILIRUB SERPL-MCNC: 0.4 MG/DL (ref 0–1.2)
BUN SERPL-MCNC: 11 MG/DL (ref 6–20)
BUN/CREAT SERPL: 18.3 (ref 7–25)
CALCIUM SPEC-SCNC: 9.8 MG/DL (ref 8.6–10.5)
CHLORIDE SERPL-SCNC: 97 MMOL/L (ref 98–107)
CO2 SERPL-SCNC: 26.3 MMOL/L (ref 22–29)
CREAT SERPL-MCNC: 0.6 MG/DL (ref 0.57–1)
DEPRECATED RDW RBC AUTO: 43.1 FL (ref 37–54)
EGFRCR SERPLBLD CKD-EPI 2021: 119.5 ML/MIN/1.73
EOSINOPHIL # BLD AUTO: 0.3 10*3/MM3 (ref 0–0.4)
EOSINOPHIL NFR BLD AUTO: 5 % (ref 0.3–6.2)
ERYTHROCYTE [DISTWIDTH] IN BLOOD BY AUTOMATED COUNT: 13.3 % (ref 12.3–15.4)
GLOBULIN UR ELPH-MCNC: 3.2 GM/DL
GLUCOSE SERPL-MCNC: 250 MG/DL (ref 65–99)
HCT VFR BLD AUTO: 32.5 % (ref 34–46.6)
HGB BLD-MCNC: 11 G/DL (ref 12–15.9)
IMM GRANULOCYTES # BLD AUTO: 0.02 10*3/MM3 (ref 0–0.05)
IMM GRANULOCYTES NFR BLD AUTO: 0.3 % (ref 0–0.5)
LYMPHOCYTES # BLD AUTO: 1.58 10*3/MM3 (ref 0.7–3.1)
LYMPHOCYTES NFR BLD AUTO: 26.5 % (ref 19.6–45.3)
MCH RBC QN AUTO: 30.1 PG (ref 26.6–33)
MCHC RBC AUTO-ENTMCNC: 33.8 G/DL (ref 31.5–35.7)
MCV RBC AUTO: 88.8 FL (ref 79–97)
MONOCYTES # BLD AUTO: 0.59 10*3/MM3 (ref 0.1–0.9)
MONOCYTES NFR BLD AUTO: 9.9 % (ref 5–12)
NEUTROPHILS NFR BLD AUTO: 3.44 10*3/MM3 (ref 1.7–7)
NEUTROPHILS NFR BLD AUTO: 57.8 % (ref 42.7–76)
NRBC BLD AUTO-RTO: 0 /100 WBC (ref 0–0.2)
PLATELET # BLD AUTO: 246 10*3/MM3 (ref 140–450)
PMV BLD AUTO: 8.9 FL (ref 6–12)
POTASSIUM SERPL-SCNC: 4.3 MMOL/L (ref 3.5–5.2)
PROT SERPL-MCNC: 7.3 G/DL (ref 6–8.5)
RBC # BLD AUTO: 3.66 10*6/MM3 (ref 3.77–5.28)
SODIUM SERPL-SCNC: 136 MMOL/L (ref 136–145)
WBC NRBC COR # BLD: 5.96 10*3/MM3 (ref 3.4–10.8)

## 2022-09-23 PROCEDURE — 80053 COMPREHEN METABOLIC PANEL: CPT | Performed by: INTERNAL MEDICINE

## 2022-09-23 PROCEDURE — 85025 COMPLETE CBC W/AUTO DIFF WBC: CPT | Performed by: INTERNAL MEDICINE

## 2022-09-23 PROCEDURE — 36592 COLLECT BLOOD FROM PICC: CPT

## 2022-09-23 PROCEDURE — 82248 BILIRUBIN DIRECT: CPT | Performed by: INTERNAL MEDICINE

## 2022-09-30 ENCOUNTER — TRANSCRIBE ORDERS (OUTPATIENT)
Dept: ONCOLOGY | Facility: HOSPITAL | Age: 37
End: 2022-09-30

## 2022-10-13 ENCOUNTER — TRANSCRIBE ORDERS (OUTPATIENT)
Dept: ADMINISTRATIVE | Facility: HOSPITAL | Age: 37
End: 2022-10-13

## 2022-10-13 ENCOUNTER — LAB (OUTPATIENT)
Dept: LAB | Facility: HOSPITAL | Age: 37
End: 2022-10-13

## 2022-10-13 DIAGNOSIS — Z01.812 PRE-OPERATIVE LABORATORY EXAMINATION: ICD-10-CM

## 2022-10-13 DIAGNOSIS — E11.40 TYPE 2 DIABETES MELLITUS WITH DIABETIC NEUROPATHY, WITH LONG-TERM CURRENT USE OF INSULIN: ICD-10-CM

## 2022-10-13 DIAGNOSIS — Z79.4 TYPE 2 DIABETES MELLITUS WITH DIABETIC NEUROPATHY, WITH LONG-TERM CURRENT USE OF INSULIN: ICD-10-CM

## 2022-10-13 DIAGNOSIS — E11.40 TYPE 2 DIABETES MELLITUS WITH DIABETIC NEUROPATHY, WITH LONG-TERM CURRENT USE OF INSULIN: Chronic | ICD-10-CM

## 2022-10-13 DIAGNOSIS — Z20.822 ENCOUNTER FOR LABORATORY TESTING FOR COVID-19 VIRUS: ICD-10-CM

## 2022-10-13 DIAGNOSIS — Z79.4 TYPE 2 DIABETES MELLITUS WITH DIABETIC NEUROPATHY, WITH LONG-TERM CURRENT USE OF INSULIN: Chronic | ICD-10-CM

## 2022-10-13 DIAGNOSIS — E55.9 VITAMIN D DEFICIENCY: ICD-10-CM

## 2022-10-13 DIAGNOSIS — Z01.812 PRE-OPERATIVE LABORATORY EXAMINATION: Primary | ICD-10-CM

## 2022-10-13 PROCEDURE — C9803 HOPD COVID-19 SPEC COLLECT: HCPCS

## 2022-10-13 PROCEDURE — U0004 COV-19 TEST NON-CDC HGH THRU: HCPCS

## 2022-10-13 RX ORDER — ERGOCALCIFEROL 1.25 MG/1
50000 CAPSULE ORAL WEEKLY
Qty: 5 CAPSULE | Refills: 0 | Status: SHIPPED | OUTPATIENT
Start: 2022-10-13 | End: 2022-12-01 | Stop reason: SDUPTHER

## 2022-10-13 RX ORDER — GABAPENTIN 300 MG/1
300 CAPSULE ORAL 2 TIMES DAILY
Qty: 60 CAPSULE | Refills: 0 | Status: SHIPPED | OUTPATIENT
Start: 2022-10-13 | End: 2022-12-01 | Stop reason: SDUPTHER

## 2022-10-15 LAB — SARS-COV-2 RNA PNL SPEC NAA+PROBE: NOT DETECTED

## 2022-11-08 DIAGNOSIS — E11.40 TYPE 2 DIABETES MELLITUS WITH DIABETIC NEUROPATHY, WITH LONG-TERM CURRENT USE OF INSULIN: Chronic | ICD-10-CM

## 2022-11-08 DIAGNOSIS — Z79.4 TYPE 2 DIABETES MELLITUS WITH DIABETIC NEUROPATHY, WITH LONG-TERM CURRENT USE OF INSULIN: Chronic | ICD-10-CM

## 2022-11-09 ENCOUNTER — HOSPITAL ENCOUNTER (EMERGENCY)
Facility: HOSPITAL | Age: 37
Discharge: HOME | End: 2022-11-09
Attending: STUDENT IN AN ORGANIZED HEALTH CARE EDUCATION/TRAINING PROGRAM | Admitting: STUDENT IN AN ORGANIZED HEALTH CARE EDUCATION/TRAINING PROGRAM

## 2022-11-09 ENCOUNTER — APPOINTMENT (OUTPATIENT)
Dept: CT IMAGING | Facility: HOSPITAL | Age: 37
End: 2022-11-09

## 2022-11-09 VITALS
TEMPERATURE: 97.8 F | HEART RATE: 92 BPM | BODY MASS INDEX: 49.51 KG/M2 | HEIGHT: 64 IN | OXYGEN SATURATION: 99 % | RESPIRATION RATE: 20 BRPM | SYSTOLIC BLOOD PRESSURE: 161 MMHG | DIASTOLIC BLOOD PRESSURE: 106 MMHG | WEIGHT: 290 LBS

## 2022-11-09 DIAGNOSIS — R10.9 RIGHT FLANK PAIN: Primary | ICD-10-CM

## 2022-11-09 DIAGNOSIS — N39.0 UTI (URINARY TRACT INFECTION), UNCOMPLICATED: ICD-10-CM

## 2022-11-09 LAB
ALBUMIN SERPL-MCNC: 4.05 G/DL (ref 3.5–5.2)
ALBUMIN/GLOB SERPL: 0.9 G/DL
ALP SERPL-CCNC: 126 U/L (ref 39–117)
ALT SERPL W P-5'-P-CCNC: 18 U/L (ref 1–33)
ANION GAP SERPL CALCULATED.3IONS-SCNC: 15.2 MMOL/L (ref 5–15)
AST SERPL-CCNC: 39 U/L (ref 1–32)
BACTERIA UR QL AUTO: ABNORMAL /HPF
BASOPHILS # BLD AUTO: 0.04 10*3/MM3 (ref 0–0.2)
BASOPHILS NFR BLD AUTO: 0.5 % (ref 0–1.5)
BILIRUB SERPL-MCNC: 0.3 MG/DL (ref 0–1.2)
BILIRUB UR QL STRIP: NEGATIVE
BUN SERPL-MCNC: 16 MG/DL (ref 6–20)
BUN/CREAT SERPL: 26.7 (ref 7–25)
CALCIUM SPEC-SCNC: 10.3 MG/DL (ref 8.6–10.5)
CHLORIDE SERPL-SCNC: 95 MMOL/L (ref 98–107)
CLARITY UR: CLEAR
CO2 SERPL-SCNC: 20.8 MMOL/L (ref 22–29)
COLOR UR: YELLOW
CREAT SERPL-MCNC: 0.6 MG/DL (ref 0.57–1)
CRP SERPL-MCNC: 5.95 MG/DL (ref 0–0.5)
DEPRECATED RDW RBC AUTO: 44.1 FL (ref 37–54)
EGFRCR SERPLBLD CKD-EPI 2021: 119.5 ML/MIN/1.73
EOSINOPHIL # BLD AUTO: 0.23 10*3/MM3 (ref 0–0.4)
EOSINOPHIL NFR BLD AUTO: 2.9 % (ref 0.3–6.2)
ERYTHROCYTE [DISTWIDTH] IN BLOOD BY AUTOMATED COUNT: 14.2 % (ref 12.3–15.4)
GLOBULIN UR ELPH-MCNC: 4.6 GM/DL
GLUCOSE SERPL-MCNC: 270 MG/DL (ref 65–99)
GLUCOSE UR STRIP-MCNC: ABNORMAL MG/DL
HCT VFR BLD AUTO: 33.3 % (ref 34–46.6)
HGB BLD-MCNC: 10.6 G/DL (ref 12–15.9)
HGB UR QL STRIP.AUTO: ABNORMAL
HOLD SPECIMEN: NORMAL
HOLD SPECIMEN: NORMAL
HYALINE CASTS UR QL AUTO: ABNORMAL /LPF
IMM GRANULOCYTES # BLD AUTO: 0.04 10*3/MM3 (ref 0–0.05)
IMM GRANULOCYTES NFR BLD AUTO: 0.5 % (ref 0–0.5)
KETONES UR QL STRIP: NEGATIVE
LEUKOCYTE ESTERASE UR QL STRIP.AUTO: ABNORMAL
LYMPHOCYTES # BLD AUTO: 1.47 10*3/MM3 (ref 0.7–3.1)
LYMPHOCYTES NFR BLD AUTO: 18.2 % (ref 19.6–45.3)
MCH RBC QN AUTO: 27.7 PG (ref 26.6–33)
MCHC RBC AUTO-ENTMCNC: 31.8 G/DL (ref 31.5–35.7)
MCV RBC AUTO: 87.2 FL (ref 79–97)
MONOCYTES # BLD AUTO: 0.6 10*3/MM3 (ref 0.1–0.9)
MONOCYTES NFR BLD AUTO: 7.4 % (ref 5–12)
NEUTROPHILS NFR BLD AUTO: 5.69 10*3/MM3 (ref 1.7–7)
NEUTROPHILS NFR BLD AUTO: 70.5 % (ref 42.7–76)
NITRITE UR QL STRIP: NEGATIVE
NRBC BLD AUTO-RTO: 0 /100 WBC (ref 0–0.2)
PH UR STRIP.AUTO: 5.5 [PH] (ref 5–8)
PLATELET # BLD AUTO: 361 10*3/MM3 (ref 140–450)
PMV BLD AUTO: 8.8 FL (ref 6–12)
POTASSIUM SERPL-SCNC: 4.6 MMOL/L (ref 3.5–5.2)
PROT SERPL-MCNC: 8.6 G/DL (ref 6–8.5)
PROT UR QL STRIP: ABNORMAL
RBC # BLD AUTO: 3.82 10*6/MM3 (ref 3.77–5.28)
RBC # UR STRIP: ABNORMAL /HPF
REF LAB TEST METHOD: ABNORMAL
SODIUM SERPL-SCNC: 131 MMOL/L (ref 136–145)
SP GR UR STRIP: 1.02 (ref 1–1.03)
SQUAMOUS #/AREA URNS HPF: ABNORMAL /HPF
UROBILINOGEN UR QL STRIP: ABNORMAL
WBC # UR STRIP: ABNORMAL /HPF
WBC NRBC COR # BLD: 8.07 10*3/MM3 (ref 3.4–10.8)
WHOLE BLOOD HOLD COAG: NORMAL
WHOLE BLOOD HOLD SPECIMEN: NORMAL
YEAST URNS QL MICRO: ABNORMAL /HPF

## 2022-11-09 PROCEDURE — 85025 COMPLETE CBC W/AUTO DIFF WBC: CPT | Performed by: PHYSICIAN ASSISTANT

## 2022-11-09 PROCEDURE — 36415 COLL VENOUS BLD VENIPUNCTURE: CPT

## 2022-11-09 PROCEDURE — 87086 URINE CULTURE/COLONY COUNT: CPT | Performed by: PHYSICIAN ASSISTANT

## 2022-11-09 PROCEDURE — 81001 URINALYSIS AUTO W/SCOPE: CPT | Performed by: PHYSICIAN ASSISTANT

## 2022-11-09 PROCEDURE — 80053 COMPREHEN METABOLIC PANEL: CPT | Performed by: PHYSICIAN ASSISTANT

## 2022-11-09 PROCEDURE — 86140 C-REACTIVE PROTEIN: CPT | Performed by: PHYSICIAN ASSISTANT

## 2022-11-09 PROCEDURE — 99284 EMERGENCY DEPT VISIT MOD MDM: CPT

## 2022-11-09 PROCEDURE — 74176 CT ABD & PELVIS W/O CONTRAST: CPT | Performed by: RADIOLOGY

## 2022-11-09 PROCEDURE — 74176 CT ABD & PELVIS W/O CONTRAST: CPT

## 2022-11-09 RX ORDER — NITROFURANTOIN 25; 75 MG/1; MG/1
100 CAPSULE ORAL 2 TIMES DAILY
Qty: 10 CAPSULE | Refills: 0 | Status: SHIPPED | OUTPATIENT
Start: 2022-11-09 | End: 2022-12-01

## 2022-11-09 RX ORDER — HYDROCODONE BITARTRATE AND ACETAMINOPHEN 7.5; 325 MG/1; MG/1
1 TABLET ORAL ONCE
Status: COMPLETED | OUTPATIENT
Start: 2022-11-09 | End: 2022-11-09

## 2022-11-09 RX ORDER — NITROFURANTOIN 25; 75 MG/1; MG/1
100 CAPSULE ORAL ONCE
Status: COMPLETED | OUTPATIENT
Start: 2022-11-09 | End: 2022-11-09

## 2022-11-09 RX ADMIN — HYDROCODONE BITARTRATE AND ACETAMINOPHEN 1 TABLET: 7.5; 325 TABLET ORAL at 17:27

## 2022-11-09 RX ADMIN — NITROFURANTOIN MONOHYDRATE/MACROCRYSTALLINE 100 MG: 25; 75 CAPSULE ORAL at 17:27

## 2022-11-09 NOTE — ED PROVIDER NOTES
Subjective   History of Present Illness  37-year-old female with significant past medical history presents to the emergency room with right flank pain and dysuria which began last night.  Patient states that she is having burning with urination as well as pain.  She does state that she had a left nephrectomy by urologist at  approximately 1 month ago.  She denies fever, chills, or body aches.  She does state that she has a history of nephrolithiasis and thinks that she may have 1 on the right side.  Aggravating factors include movement and urination.  She denies any alleviating factors despite rest.  She denies any other complaints or concerns at this time.    History provided by:  Patient   used: No        Review of Systems   Constitutional: Negative.  Negative for fever.   HENT: Negative.    Respiratory: Negative.    Cardiovascular: Negative.  Negative for chest pain.   Gastrointestinal: Negative.  Negative for abdominal pain.   Endocrine: Negative.    Genitourinary: Positive for flank pain. Negative for dysuria.   Skin: Negative.    Neurological: Negative.    Psychiatric/Behavioral: Negative.    All other systems reviewed and are negative.      Past Medical History:   Diagnosis Date   • A-fib (HCC)    • Abnormal ECG    • Anemia    • Anxiety    • Asthma    • Cancer (HCC)     Ovarian   • Depression    • Diabetes mellitus (HCC)    • DVT (deep venous thrombosis) ([facility])    • Factor 5 Leiden mutation, heterozygous ([facility])    • Fibroid    • GERD (gastroesophageal reflux disease)    • Gout    • H/O abdominal abscess    • History of sepsis    • History of transfusion    • Hyperlipidemia    • Hypothyroid    • Kidney stone    • Migraines    • Neuropathy    • Ovarian cancer (HCC) 02/2021   • Ovarian cyst    • PE (pulmonary embolism)    • Polycystic ovary syndrome    • Preeclampsia    • Rh incompatibility    • Stroke (HCC)    • TIA (transient ischemic attack)    • Urinary tract infection    • Varicella         Allergies   Allergen Reactions   • Toradol [Ketorolac Tromethamine] Anaphylaxis and Hives   • Haldol [Haloperidol] Hives and Mental Status Change   • Tramadol Hives and Swelling   • Amoxicillin Hives and Rash   • Penicillins Hives and Rash       Past Surgical History:   Procedure Laterality Date   • ABDOMINAL SURGERY     • CARDIAC CATHETERIZATION     •  SECTION     • CHOLECYSTECTOMY     • COLONOSCOPY     • ENDOSCOPY     • EXTRACORPOREAL SHOCK WAVE LITHOTRIPSY (ESWL) Left 10/22/2021    Procedure: EXTRACORPOREAL SHOCKWAVE LITHOTRIPSY;  Surgeon: Milan Motley MD;  Location: [facility];  Service: Urology;  Laterality: Left;   • LITHOTRIPSY     • RIGHT OOPHORECTOMY     • URETEROSCOPY LASER LITHOTRIPSY WITH STENT INSERTION Left 10/01/2021    Procedure: URETEROSCOPY WITH STENT PLACEMENT;  Surgeon: Milan Motley MD;  Location: [facility];  Service: Urology;  Laterality: Left;   • URETEROSCOPY LASER LITHOTRIPSY WITH STENT INSERTION Left 2022    Procedure: URETEROSCOPY STENT REMOVAL;  Surgeon: Milan Motley MD;  Location: [facility];  Service: Urology;  Laterality: Left;   • URETEROSCOPY LASER LITHOTRIPSY WITH STENT INSERTION Left 2022    Procedure: CYSTOSCOPY RETROGRADE LEFT WITH STENT PLACEMENT;  Surgeon: Milan Motley MD;  Location: [facility];  Service: Urology;  Laterality: Left;       Family History   Problem Relation Age of Onset   • Hypertension Mother    • Diabetes Father    • Hypertension Father    • Heart attack Father    • No Known Problems Sister    • No Known Problems Brother    • No Known Problems Son    • No Known Problems Daughter    • No Known Problems Maternal Grandmother    • No Known Problems Maternal Grandfather    • No Known Problems Paternal Grandmother    • No Known Problems Paternal Grandfather    • No Known Problems Cousin    • Rheum arthritis Neg Hx    • Osteoarthritis Neg Hx    • Asthma Neg Hx    • Heart failure Neg Hx    • Hyperlipidemia  "Neg Hx    • Migraines Neg Hx    • Rashes / Skin problems Neg Hx    • Seizures Neg Hx    • Stroke Neg Hx    • Thyroid disease Neg Hx        Social History     Socioeconomic History   • Marital status:    Tobacco Use   • Smoking status: Never   • Smokeless tobacco: Never   Vaping Use   • Vaping Use: Never used   Substance and Sexual Activity   • Alcohol use: No   • Drug use: Never     Comment: Pt has track marks on right arm. Pt states \"It's from my INR being drawn.\"    • Sexual activity: Not Currently     Partners: Male     Birth control/protection: None           Objective   Physical Exam  Vitals and nursing note reviewed.   Constitutional:       General: She is not in acute distress.     Appearance: She is well-developed. She is not diaphoretic.   HENT:      Head: Normocephalic and atraumatic.      Right Ear: External ear normal.      Left Ear: External ear normal.      Nose: Nose normal.   Eyes:      Conjunctiva/sclera: Conjunctivae normal.      Pupils: Pupils are equal, round, and reactive to light.   Neck:      Vascular: No JVD.      Trachea: No tracheal deviation.   Cardiovascular:      Rate and Rhythm: Normal rate and regular rhythm.      Heart sounds: Normal heart sounds. No murmur heard.  Pulmonary:      Effort: Pulmonary effort is normal. No respiratory distress.      Breath sounds: Normal breath sounds. No wheezing.   Abdominal:      General: Bowel sounds are normal.      Palpations: Abdomen is soft.      Tenderness: There is no abdominal tenderness. There is right CVA tenderness. There is no left CVA tenderness.   Musculoskeletal:         General: No deformity. Normal range of motion.      Cervical back: Normal range of motion and neck supple.   Skin:     General: Skin is warm and dry.      Coloration: Skin is not pale.      Findings: No erythema or rash.   Neurological:      Mental Status: She is alert and oriented to person, place, and time.      Cranial Nerves: No cranial nerve deficit. "   Psychiatric:         Behavior: Behavior normal.         Thought Content: Thought content normal.         Procedures           ED Course  ED Course as of 11/09/22 2147   Wed Nov 09, 2022   1534 CT Abdomen Pelvis Stone Protocol [TK]   1607 Discussed CT with Dr. Coelho, recommends consulting with  Urology. [TK]   1704 Spoke with Dr. Reilly, Urologist at  and at this time recommends outpatient follow up in clinic. [TK]      ED Course User Index  [TK] Arjun Alvares, PAJENIFER                                           MDM  Number of Diagnoses or Management Options  Right flank pain: new and requires workup  UTI (urinary tract infection), uncomplicated: new and requires workup     Amount and/or Complexity of Data Reviewed  Clinical lab tests: reviewed and ordered  Tests in the radiology section of CPT®: reviewed and ordered  Discuss the patient with other providers: yes (Tommie)    Risk of Complications, Morbidity, and/or Mortality  Presenting problems: moderate  Diagnostic procedures: moderate  Management options: moderate    Patient Progress  Patient progress: stable      Final diagnoses:   Right flank pain   UTI (urinary tract infection), uncomplicated       ED Disposition  ED Disposition     ED Disposition   Discharge    Condition   Stable    Comment   --             Eddie Latif, APRN  [address] [facility] [id number] [phone]    In 2 days      Kailash Mckee MD  [address] [facility] [address]  [facility] [id number] [phone]    In 2 days           Medication List      New Prescriptions    nitrofurantoin (macrocrystal-monohydrate) 100 MG capsule  Commonly known as: MACROBID  Take 1 capsule by mouth 2 (Two) Times a Day.           Where to Get Your Medications      These medications were sent to [place], [place] - [address] [place] [address] - [phone]  - [phone] [address] [address]    Phone: [phone]   · nitrofurantoin (macrocrystal-monohydrate) 100 MG  Arjun Arellano PA-C  11/09/22 2140

## 2022-11-10 LAB — BACTERIA SPEC AEROBE CULT: NORMAL

## 2022-12-01 ENCOUNTER — OFFICE VISIT (OUTPATIENT)
Dept: FAMILY MEDICINE CLINIC | Facility: CLINIC | Age: 37
End: 2022-12-01

## 2022-12-01 VITALS
TEMPERATURE: 98.3 F | BODY MASS INDEX: 49.85 KG/M2 | DIASTOLIC BLOOD PRESSURE: 80 MMHG | RESPIRATION RATE: 14 BRPM | SYSTOLIC BLOOD PRESSURE: 130 MMHG | OXYGEN SATURATION: 95 % | HEIGHT: 64 IN | WEIGHT: 292 LBS | HEART RATE: 95 BPM

## 2022-12-01 DIAGNOSIS — E55.9 VITAMIN D DEFICIENCY: ICD-10-CM

## 2022-12-01 DIAGNOSIS — E53.8 VITAMIN B 12 DEFICIENCY: ICD-10-CM

## 2022-12-01 DIAGNOSIS — I10 PRIMARY HYPERTENSION: Chronic | ICD-10-CM

## 2022-12-01 DIAGNOSIS — F33.0 MAJOR DEPRESSIVE DISORDER, RECURRENT EPISODE, MILD DEGREE: Chronic | ICD-10-CM

## 2022-12-01 DIAGNOSIS — Z79.4 TYPE 2 DIABETES MELLITUS WITH HYPERGLYCEMIA, WITH LONG-TERM CURRENT USE OF INSULIN: Chronic | ICD-10-CM

## 2022-12-01 DIAGNOSIS — D68.51 FACTOR 5 LEIDEN MUTATION, HETEROZYGOUS: Chronic | ICD-10-CM

## 2022-12-01 DIAGNOSIS — Z23 ENCOUNTER FOR IMMUNIZATION: ICD-10-CM

## 2022-12-01 DIAGNOSIS — E83.42 HYPOMAGNESEMIA: ICD-10-CM

## 2022-12-01 DIAGNOSIS — J30.9 ALLERGIC RHINITIS, UNSPECIFIED SEASONALITY, UNSPECIFIED TRIGGER: ICD-10-CM

## 2022-12-01 DIAGNOSIS — E78.5 DYSLIPIDEMIA: Chronic | ICD-10-CM

## 2022-12-01 DIAGNOSIS — Z51.81 ENCOUNTER FOR MEDICATION MONITORING: ICD-10-CM

## 2022-12-01 DIAGNOSIS — J45.909 MODERATE ASTHMA, UNSPECIFIED WHETHER COMPLICATED, UNSPECIFIED WHETHER PERSISTENT: Chronic | ICD-10-CM

## 2022-12-01 DIAGNOSIS — E11.65 TYPE 2 DIABETES MELLITUS WITH HYPERGLYCEMIA, WITH LONG-TERM CURRENT USE OF INSULIN: Chronic | ICD-10-CM

## 2022-12-01 DIAGNOSIS — Z29.9 PREVENTIVE MEASURE: ICD-10-CM

## 2022-12-01 DIAGNOSIS — R05.9 COUGH, UNSPECIFIED TYPE: ICD-10-CM

## 2022-12-01 DIAGNOSIS — J01.00 ACUTE MAXILLARY SINUSITIS, RECURRENCE NOT SPECIFIED: ICD-10-CM

## 2022-12-01 DIAGNOSIS — D64.9 ANEMIA, UNSPECIFIED TYPE: ICD-10-CM

## 2022-12-01 DIAGNOSIS — M1A.9XX0 CHRONIC GOUT WITHOUT TOPHUS, UNSPECIFIED CAUSE, UNSPECIFIED SITE: Chronic | ICD-10-CM

## 2022-12-01 DIAGNOSIS — R05.9 COUGH: ICD-10-CM

## 2022-12-01 LAB
B PARAPERT DNA SPEC QL NAA+PROBE: NOT DETECTED
B PERT DNA SPEC QL NAA+PROBE: NOT DETECTED
C PNEUM DNA NPH QL NAA+NON-PROBE: NOT DETECTED
EXPIRATION DATE: NORMAL
FLUAV SUBTYP SPEC NAA+PROBE: NOT DETECTED
FLUBV RNA ISLT QL NAA+PROBE: NOT DETECTED
HADV DNA SPEC NAA+PROBE: NOT DETECTED
HBA1C MFR BLD: 9 %
HCOV 229E RNA SPEC QL NAA+PROBE: NOT DETECTED
HCOV HKU1 RNA SPEC QL NAA+PROBE: NOT DETECTED
HCOV NL63 RNA SPEC QL NAA+PROBE: NOT DETECTED
HCOV OC43 RNA SPEC QL NAA+PROBE: NOT DETECTED
HMPV RNA NPH QL NAA+NON-PROBE: NOT DETECTED
HPIV1 RNA ISLT QL NAA+PROBE: NOT DETECTED
HPIV2 RNA SPEC QL NAA+PROBE: NOT DETECTED
HPIV3 RNA NPH QL NAA+PROBE: NOT DETECTED
HPIV4 P GENE NPH QL NAA+PROBE: NOT DETECTED
Lab: NORMAL
M PNEUMO IGG SER IA-ACNC: NOT DETECTED
RHINOVIRUS RNA SPEC NAA+PROBE: NOT DETECTED
RSV RNA NPH QL NAA+NON-PROBE: NOT DETECTED
SARS-COV-2 RNA NPH QL NAA+NON-PROBE: NOT DETECTED

## 2022-12-01 PROCEDURE — 80053 COMPREHEN METABOLIC PANEL: CPT | Performed by: NURSE PRACTITIONER

## 2022-12-01 PROCEDURE — 0202U NFCT DS 22 TRGT SARS-COV-2: CPT | Performed by: NURSE PRACTITIONER

## 2022-12-01 PROCEDURE — 36416 COLLJ CAPILLARY BLOOD SPEC: CPT | Performed by: NURSE PRACTITIONER

## 2022-12-01 PROCEDURE — 82306 VITAMIN D 25 HYDROXY: CPT | Performed by: NURSE PRACTITIONER

## 2022-12-01 PROCEDURE — 83735 ASSAY OF MAGNESIUM: CPT | Performed by: NURSE PRACTITIONER

## 2022-12-01 PROCEDURE — 85025 COMPLETE CBC W/AUTO DIFF WBC: CPT | Performed by: NURSE PRACTITIONER

## 2022-12-01 PROCEDURE — 83036 HEMOGLOBIN GLYCOSYLATED A1C: CPT | Performed by: NURSE PRACTITIONER

## 2022-12-01 PROCEDURE — 3052F HG A1C>EQUAL 8.0%<EQUAL 9.0%: CPT | Performed by: NURSE PRACTITIONER

## 2022-12-01 PROCEDURE — 82607 VITAMIN B-12: CPT | Performed by: NURSE PRACTITIONER

## 2022-12-01 PROCEDURE — 90471 IMMUNIZATION ADMIN: CPT | Performed by: NURSE PRACTITIONER

## 2022-12-01 PROCEDURE — 95251 CONT GLUC MNTR ANALYSIS I&R: CPT | Performed by: NURSE PRACTITIONER

## 2022-12-01 PROCEDURE — 99214 OFFICE O/P EST MOD 30 MIN: CPT | Performed by: NURSE PRACTITIONER

## 2022-12-01 PROCEDURE — 82043 UR ALBUMIN QUANTITATIVE: CPT | Performed by: NURSE PRACTITIONER

## 2022-12-01 PROCEDURE — 90686 IIV4 VACC NO PRSV 0.5 ML IM: CPT | Performed by: NURSE PRACTITIONER

## 2022-12-01 PROCEDURE — 80061 LIPID PANEL: CPT | Performed by: NURSE PRACTITIONER

## 2022-12-01 PROCEDURE — 84443 ASSAY THYROID STIM HORMONE: CPT | Performed by: NURSE PRACTITIONER

## 2022-12-01 RX ORDER — CEFDINIR 300 MG/1
300 CAPSULE ORAL 2 TIMES DAILY
Qty: 20 CAPSULE | Refills: 0 | Status: SHIPPED | OUTPATIENT
Start: 2022-12-01 | End: 2022-12-11

## 2022-12-01 RX ORDER — GABAPENTIN 300 MG/1
300 CAPSULE ORAL NIGHTLY PRN
Qty: 30 CAPSULE | Refills: 0 | Status: SHIPPED | OUTPATIENT
Start: 2022-12-01 | End: 2023-01-04 | Stop reason: SDUPTHER

## 2022-12-01 RX ORDER — FLUCONAZOLE 150 MG/1
150 TABLET ORAL DAILY
Qty: 3 TABLET | Refills: 0 | Status: SHIPPED | OUTPATIENT
Start: 2022-12-01 | End: 2022-12-04

## 2022-12-01 RX ORDER — PROCHLORPERAZINE 25 MG/1
1 SUPPOSITORY RECTAL DAILY
Qty: 1 EACH | Refills: 3 | Status: SHIPPED | OUTPATIENT
Start: 2022-12-01 | End: 2023-03-01

## 2022-12-01 RX ORDER — ALBUTEROL SULFATE 90 UG/1
2 AEROSOL, METERED RESPIRATORY (INHALATION) EVERY 4 HOURS PRN
Qty: 18 G | Refills: 2 | Status: SHIPPED | OUTPATIENT
Start: 2022-12-01 | End: 2023-03-28 | Stop reason: SDUPTHER

## 2022-12-01 RX ORDER — INSULIN LISPRO 100 [IU]/ML
INJECTION, SOLUTION INTRAVENOUS; SUBCUTANEOUS
COMMUNITY
End: 2022-12-01 | Stop reason: SDUPTHER

## 2022-12-01 RX ORDER — INSULIN GLARGINE 100 [IU]/ML
INJECTION, SOLUTION SUBCUTANEOUS
Qty: 24 ML | Refills: 1 | Status: SHIPPED | OUTPATIENT
Start: 2022-12-01 | End: 2023-01-04 | Stop reason: SDUPTHER

## 2022-12-01 RX ORDER — NEBULIZER ACCESSORIES
1 KIT MISCELLANEOUS TAKE AS DIRECTED
Qty: 1 EACH | Refills: 1 | Status: SHIPPED | OUTPATIENT
Start: 2022-12-01

## 2022-12-01 RX ORDER — PROCHLORPERAZINE 25 MG/1
SUPPOSITORY RECTAL
Qty: 9 EACH | Refills: 3 | Status: SHIPPED | OUTPATIENT
Start: 2022-12-01

## 2022-12-01 RX ORDER — DULOXETIN HYDROCHLORIDE 30 MG/1
30 CAPSULE, DELAYED RELEASE ORAL DAILY
Qty: 30 CAPSULE | Refills: 0 | Status: SHIPPED | OUTPATIENT
Start: 2022-12-01 | End: 2023-01-12 | Stop reason: SDUPTHER

## 2022-12-01 RX ORDER — INSULIN LISPRO 100 [IU]/ML
INJECTION, SOLUTION INTRAVENOUS; SUBCUTANEOUS
Qty: 15 ML | Refills: 1 | Status: SHIPPED | OUTPATIENT
Start: 2022-12-01 | End: 2022-12-30

## 2022-12-01 RX ORDER — AMLODIPINE BESYLATE 10 MG/1
10 TABLET ORAL DAILY
Qty: 30 TABLET | Refills: 2 | Status: SHIPPED | OUTPATIENT
Start: 2022-12-01 | End: 2023-03-28 | Stop reason: SDUPTHER

## 2022-12-01 RX ORDER — ERGOCALCIFEROL 1.25 MG/1
50000 CAPSULE ORAL WEEKLY
Qty: 5 CAPSULE | Refills: 3 | Status: SHIPPED | OUTPATIENT
Start: 2022-12-01

## 2022-12-01 RX ORDER — DEXTROMETHORPHAN HYDROBROMIDE AND PROMETHAZINE HYDROCHLORIDE 15; 6.25 MG/5ML; MG/5ML
5 SYRUP ORAL 2 TIMES DAILY PRN
Qty: 180 ML | Refills: 1 | Status: SHIPPED | OUTPATIENT
Start: 2022-12-01 | End: 2023-01-12 | Stop reason: SDUPTHER

## 2022-12-01 RX ORDER — AZELASTINE 1 MG/ML
SPRAY, METERED NASAL
Qty: 1 EACH | Refills: 2 | Status: SHIPPED | OUTPATIENT
Start: 2022-12-01 | End: 2023-03-28 | Stop reason: SDUPTHER

## 2022-12-01 RX ORDER — LANCETS 33 GAUGE
1 EACH MISCELLANEOUS 3 TIMES DAILY
Qty: 100 EACH | Refills: 3 | Status: SHIPPED | OUTPATIENT
Start: 2022-12-01 | End: 2023-03-28 | Stop reason: SDUPTHER

## 2022-12-01 RX ORDER — IPRATROPIUM BROMIDE AND ALBUTEROL SULFATE 2.5; .5 MG/3ML; MG/3ML
3 SOLUTION RESPIRATORY (INHALATION) EVERY 4 HOURS PRN
Qty: 150 ML | Refills: 2 | Status: SHIPPED | OUTPATIENT
Start: 2022-12-01

## 2022-12-01 RX ORDER — METOPROLOL SUCCINATE 50 MG/1
50 TABLET, EXTENDED RELEASE ORAL DAILY
Qty: 30 TABLET | Refills: 3 | Status: SHIPPED | OUTPATIENT
Start: 2022-12-01 | End: 2023-02-07 | Stop reason: SDUPTHER

## 2022-12-01 RX ORDER — NYSTATIN 100000 [USP'U]/G
POWDER TOPICAL 3 TIMES DAILY
Qty: 60 G | Refills: 1 | Status: SHIPPED | OUTPATIENT
Start: 2022-12-01

## 2022-12-01 RX ORDER — MONTELUKAST SODIUM 10 MG/1
10 TABLET ORAL NIGHTLY
Qty: 30 TABLET | Refills: 3 | Status: SHIPPED | OUTPATIENT
Start: 2022-12-01 | End: 2023-03-28 | Stop reason: SDUPTHER

## 2022-12-01 RX ORDER — ALLOPURINOL 100 MG/1
100 TABLET ORAL DAILY
Qty: 30 TABLET | Refills: 3 | Status: SHIPPED | OUTPATIENT
Start: 2022-12-01 | End: 2023-03-28 | Stop reason: SDUPTHER

## 2022-12-01 NOTE — PROGRESS NOTES
Injection  Injection performed in right deltoid by Monie Bond MA. Patient tolerated the procedure well without complications.  12/01/22   Monie Bond MA

## 2022-12-01 NOTE — PROGRESS NOTES
History of Present Illness  Natalia Arzate is a 37 y.o. female who presents to the clinic for follow-up due to her multiple medical problems.  She did have a recent left nephrectomy at [facility] after dealing with multiple renal stones and infections throughout 2022.  She was hospitalized at [facility] from 12/26/2021 until 2/15/2022.  She was admitted for a disseminated infection.  She presented emergently at [facility] on 12/26/2021 following a fall at home. She was transferred to . She was  diagnosed with L Pyelonephritis complicated by multiple abscesses as well as superinfection of L iliopsoas hematoma on admission at [facility]. During admission, she developed acute hypoxic respiratory failure presumed due to aspiration and septic shock. She was transferred to ICU. She was intubated and required CRRT/HD while in ICU. Eventually transferred to the floor no longer requiring HD. She does have Oxygen at her home which she uses intermittently when she develops shortness of air.  During hospitalization, she had an acute/subacute R cerebellar ischemic stroke on 1/26/2022 with recommendation to continue on Anticoagulation.   Natalia has been diagnosed with Factor V Leiden, DM, type 2 with neuropathy currently treated with insulin.  In addition, she has HTN, Dyslipidemia, Hypothyroidism, Renal Stones and Gout.     Diabetes  She has type 2 diabetes mellitus treated with insulin both basal and fast acting. She is using a DexCom which is helping her.  Risk factors for coronary artery disease include diabetes mellitus, dyslipidemia, hypertension, obesity, stress and sedentary lifestyle.     Lab Results   Component Value Date    HGBA1C 5.60 04/10/2022     Lab Results   Component Value Date    HGBA1C 8.50 (H) 07/14/2022     Lab Results   Component Value Date    HGBA1C 9 12/01/2022     Lab Results   Component Value Date    HGBA1C 8.9 12/01/2022     Hypertension  Currently taking Amlodipine and  Metoprolol.    Lab Results   Component Value Date    GLUCOSE 271 (H) 12/01/2022    BUN 10 12/01/2022    CREATININE 0.67 12/01/2022    BCR 14.9 12/01/2022    K 4.5 12/01/2022    CO2 21.9 (L) 12/01/2022    CALCIUM 9.6 12/01/2022    ALBUMIN 3.90 12/01/2022    AST 16 12/01/2022    ALT 9 12/01/2022     Dyslipidemia  Previously prescribed Omega which she has not been taking.      Lab Results   Component Value Date    CHOL 157 12/01/2022    TRIG 243 (H) 12/01/2022    HDL 22 (L) 12/01/2022    LDL 93 12/01/2022     Hypothyroidism  Had previously been prescribed Levothyroxine but her last TSH was in range without medication  Lab Results   Component Value Date    TSH 3.890 12/01/2022     Kidney Disease  Previous Hydronephrosis and obstructing stone as well as nonobstructing renal stones.  She has had multiple procedures per urologist, Dr. Motley and  medical team.  Recent left nephrectomy per  medical urology team.    Back Pain  Shares she continues to use her walker for support during ambulation.  She is now able to go upstairs to her home with some  difficulty.    The following portions of the patient's history were reviewed and updated as appropriate: allergies, current medications, past family history, past medical history, past social history, past surgical history and problem list.    Review of Systems   Constitutional: Positive for fatigue. Negative for activity change, appetite change, chills, fever and unexpected weight change.   HENT: Positive for congestion, ear pain, postnasal drip, rhinorrhea, sinus pressure and sinus pain. Negative for voice change.    Eyes: Negative for pain, discharge, redness, itching and visual disturbance.   Respiratory: Positive for cough. Negative for shortness of breath and wheezing.    Cardiovascular: Positive for leg swelling. Negative for chest pain and palpitations.   Gastrointestinal: Negative for nausea and vomiting.   Endocrine: Negative for cold intolerance, heat intolerance,  "polydipsia, polyphagia and polyuria.   Musculoskeletal: Positive for arthralgias and gait problem.   Skin: Negative for color change and rash.   Allergic/Immunologic: Positive for environmental allergies.   Neurological: Positive for numbness (Left foot numbness secondary to CVA) and headaches. Negative for dizziness, tremors, speech difficulty, weakness and light-headedness.   Hematological: Negative for adenopathy.   Psychiatric/Behavioral: Negative for confusion, decreased concentration and suicidal ideas. The patient is not nervous/anxious.    All other systems reviewed and are negative.    Vital signs:  /80 (BP Location: Right arm, Patient Position: Sitting, Cuff Size: Adult)   Pulse 95   Temp 98.3 °F (36.8 °C) (Temporal)   Resp 14   Ht 162.6 cm (64.02\")   Wt 132 kg (292 lb)   LMP 10/31/2017   SpO2 95%   BMI 50.10 kg/m²     Physical Exam  Vitals and nursing note reviewed.   Constitutional:       General: She is not in acute distress.     Appearance: She is well-developed.      Comments: Sitting in a wheelchair.  Her daughter is with her.   HENT:      Head: Normocephalic.      Right Ear: A middle ear effusion is present. Tympanic membrane is not erythematous.      Left Ear: A middle ear effusion is present. Tympanic membrane is not erythematous.      Nose:      Right Sinus: Maxillary sinus tenderness present.      Left Sinus: Maxillary sinus tenderness present.   Eyes:      General: No scleral icterus.        Right eye: No discharge.         Left eye: No discharge.      Conjunctiva/sclera: Conjunctivae normal.   Neck:      Thyroid: No thyromegaly.      Vascular: No JVD.   Cardiovascular:      Rate and Rhythm: Normal rate and regular rhythm.      Heart sounds: Normal heart sounds. No murmur heard.    No friction rub.   Pulmonary:      Effort: Pulmonary effort is normal. No respiratory distress.      Breath sounds: Normal breath sounds. No wheezing or rales.   Abdominal:      General: Bowel sounds " are normal. There is no distension.      Palpations: Abdomen is soft.      Tenderness: There is no abdominal tenderness. There is no guarding.   Musculoskeletal:         General: Tenderness present.      Cervical back: Neck supple.      Right lower leg: No edema.      Left lower leg: No edema.   Lymphadenopathy:      Cervical: No cervical adenopathy.   Skin:     General: Skin is warm and dry.      Capillary Refill: Capillary refill takes less than 2 seconds.      Findings: No erythema or rash.   Neurological:      Mental Status: She is alert and oriented to person, place, and time.      Gait: Gait abnormal.   Psychiatric:         Behavior: Behavior normal.         Thought Content: Thought content normal.         Judgment: Judgment normal.     Result Review : Dexcom uploaded  Dates include Friday, November 18 through Thursday, December 1, 2022   Upload reviews 13% in range  61% high  96% very high  No low or very low  Average glucose 226 mg per decimal with a GMI of 8.7.  Multiple ED visits     Class 3 Severe Obesity (BMI >=40). Obesity-related health conditions include the following: hypertension, diabetes mellitus and dyslipidemias. Obesity is unchanged. BMI  is above average; BMI management plan is completed. We discussed portion control and increasing exercise.     Assessment & Plan     Diagnoses and all orders for this visit:    1. Type 2 diabetes mellitus with hyperglycemia, with long-term current use of insulin (HCC)  Comments:  Findings and recommendations discussed with Natalia.  She will continue Lantus and Humalog.  Dexcom supplies ordered  Orders:  -     Cancel: Comprehensive Metabolic Panel  -     Cancel: Lipid Panel  -     Cancel: TSH  -     MicroAlbumin, Urine, Random - Urine, Random Void  -     Hemoglobin A1c  -     POC Glycosylated Hemoglobin (Hb A1C)  -     gabapentin (NEURONTIN) 300 MG capsule; Take 1 capsule by mouth At Night As Needed (neuropathy) for up to 30 days.  Dispense: 30 capsule; Refill:  0  -     glucose blood test strip; 1 each by Other route 3 (Three) Times a Day.  Dispense: 100 each; Refill: 5  -     Insulin Glargine (Lantus SoloStar) 100 UNIT/ML injection pen; Maximum daily dose of 75 units.  Dispense: 24 mL; Refill: 1  -     Insulin Lispro (humaLOG) 100 UNIT/ML injection; Per sliding scale. Maximum daily dose 50 units  Dispense: 15 mL; Refill: 1  -     Insulin Pen Needle 30G X 8 MM misc; 1 Device 4 (Four) Times a Day.  Dispense: 200 each; Refill: 2  -     Lancets Micro Thin 33G misc; 1 Device 3 (Three) Times a Day.  Dispense: 100 each; Refill: 3  -     Semaglutide, 2 MG/DOSE, 8 MG/3ML solution pen-injector; Inject 2 mg under the skin into the appropriate area as directed 1 (One) Time Per Week.  Dispense: 3 mL; Refill: 3  -     Continuous Blood Gluc Transmit (Dexcom G6 Transmitter) misc; 1 Device Daily for 90 days.  Dispense: 1 each; Refill: 3  -     Continuous Blood Gluc Sensor (Dexcom G6 Sensor); Every 10 (Ten) Days.  Dispense: 9 each; Refill: 3    2. Primary hypertension  Comments:  Continue Amlodipine and Metoprolol.  Will consider ARB/ACEI   Orders:  -     amLODIPine (NORVASC) 10 MG tablet; Take 1 tablet by mouth Daily.  Dispense: 30 tablet; Refill: 2  -     metoprolol succinate XL (TOPROL-XL) 50 MG 24 hr tablet; Take 1 tablet by mouth Daily.  Dispense: 30 tablet; Refill: 3    3. Dyslipidemia  Comments:  Lipid panel ordered.  Orders:  -     Cancel: Lipid Panel  -     Cancel: TSH    4. Factor 5 Leiden mutation, heterozygous (HCC)  Comments:  Continue Eliquis follow-up with hematologist  Orders:  -     apixaban (ELIQUIS) 5 MG tablet tablet; Take 1 tablet by mouth 2 (Two) Times a Day.  Dispense: 60 tablet; Refill: 2    5. Moderate asthma, unspecified whether complicated, unspecified whether persistent  Comments:  Continue Alb Inhaler prn, Singulair. Avoidance of triggers   Orders:  -     ipratropium-albuterol (DUO-NEB) 0.5-2.5 mg/3 ml nebulizer; Take 3 mL by nebulization Every 4 (Four) Hours  As Needed for Shortness of Air.  Dispense: 150 mL; Refill: 2  -     albuterol sulfate  (90 Base) MCG/ACT inhaler; Inhale 2 puffs Every 4 (Four) Hours As Needed for Wheezing.  Dispense: 18 g; Refill: 2  -     montelukast (SINGULAIR) 10 MG tablet; Take 1 tablet by mouth Every Night.  Dispense: 30 tablet; Refill: 3  -     Respiratory Therapy Supplies (Nebulizer/Tubing/Mouthpiece) kit; 1 each Take As Directed.  Dispense: 1 each; Refill: 1    6. Chronic gout without tophus, unspecified cause, unspecified site  Comments:  Continue allopurinol   Orders:  -     allopurinol (ZYLOPRIM) 100 MG tablet; Take 1 tablet by mouth Daily.  Dispense: 30 tablet; Refill: 3    7. Major depressive disorder, recurrent episode, mild degree (HCC)  Comments:  Responding well to Cymbalta.  Will increase with goal of receiving gabapentin  Orders:  -     DULoxetine (CYMBALTA) 30 MG capsule; Take 1 capsule by mouth Daily.  Dispense: 30 capsule; Refill: 0    8. Vitamin D deficiency  Comments:  Continue vitamin D  Orders:  -     Vitamin D,25-Hydroxy  -     vitamin D (ERGOCALCIFEROL) 1.25 MG (07167 UT) capsule capsule; Take 1 capsule by mouth 1 (One) Time Per Week. Prior to [facility] [place]mission, Patient was on:  takes on saturday  Dispense: 5 capsule; Refill: 3    9. Vitamin B 12 deficiency  Comments:  Continue vitamin B12  Orders:  -     Vitamin B12  -     cyanocobalamin ([employer] Vitamin B-12) 2000 MCG tablet; Take 1 tablet by mouth Daily.  Dispense: 30 tablet; Refill: 3    10. Anemia, unspecified type  Comments:  Labs ordered to evaluate  Orders:  -     CBC & Differential    11. Hypomagnesemia  Comments:  Magnesium level checked  Orders:  -     Magnesium    12. Cough, unspecified type  -     Respiratory Panel PCR w/COVID-19(SARS-CoV-2) CHARLY/TAWANA/JOCELYN/PAD/COR/MAD/ETTA In-House, NP Swab in UTM/VTM, 3-4 HR TAT - Swab, Nasopharynx; Future  -     Respiratory Panel PCR w/COVID-19(SARS-CoV-2) CHARLY/TAWANA/JOCELYN/PAD/COR/MAD/ETTA In-House, NP Swab in UTM/VTM, 3-4 HR  TAT - Swab, Nasopharynx    13. Allergic rhinitis, unspecified seasonality, unspecified trigger  Comments:  Astelin Nasal Spray   Orders:  -     azelastine (ASTELIN) 0.1 % nasal spray; 2 sprays both nostrils twice daily as needed  Dispense: 1 each; Refill: 2    14. Cough  Comments:  Promethazine DM prescribed to be used primarily at night  Orders:  -     promethazine-dextromethorphan (PROMETHAZINE-DM) 6.25-15 MG/5ML syrup; Take 5 mL by mouth 2 (Two) Times a Day As Needed for Cough.  Dispense: 180 mL; Refill: 1    15. Acute maxillary sinusitis, recurrence not specified  Comments:  Cefdinir prescribed.  Counseled regarding supportive care measures  Orders:  -     cefdinir (OMNICEF) 300 MG capsule; Take 1 capsule by mouth 2 (Two) Times a Day for 10 days.  Dispense: 20 capsule; Refill: 0    16. Preventive measure  Comments:  Zofran as needed  Orders:  -     fluconazole (DIFLUCAN) 150 MG tablet; Take 1 tablet by mouth Daily for 3 doses.  Dispense: 3 tablet; Refill: 0  -     nystatin (MYCOSTATIN) 386278 UNIT/GM powder; Apply  topically to the appropriate area as directed 3 (Three) Times a Day.  Dispense: 60 g; Refill: 1    17. Encounter for immunization  Comments:  Influenza vaccine given  Orders:  -     FluLaval/Fluzone >6 mos ([id number])    18. Encounter for medication monitoring  Comments:  UDS completed today  Orders:  -     Urine Drug Screen - Urine, Clean Catch; Future  -     Urine Drug Screen - Urine, Clean Catch    Follow Up in January  Findings and recommendations discussed with Natalia. Reviewed treatment options. Lifestyle modifications reinforced including nutrition and activity recommendations.  Tim will follow up in January sooner if problems/concerns occur.  Natalia was given instructions and counseling regarding her condition or for health maintenance advice. Please see specific information pulled into the AVS if appropriate    As part of the patient's treatment plan they are being prescribed a  controlled substance/ substances with potential for abuse.  This patient has been made aware of the appropriate use of such medications, including potential risk of somnolence, limited ability to drive and/or work safely, and potential for overdose.  It has also been made clear these medications are for use by the patient only, without concomitant use of alcohol or other substances unless prescribed/advised by medical provider.    Patient has completed prescribing agreement detailing terms of continued prescribing of controlled substances including monitoring PRANAY reports, urine drug screens, and pill counts.  The patient is aware PRANAY reports are reviewed on a regular basis and scanned into the chart.    History and physical exam exhibit continued safe and appropriate use of controlled substances.    [facility] Patient Controlled Substance Report (from 12/3/2021 to 12/1/2022)    Dispensed  Strength Quantity Days Supply Provider Pharmacy   10/22/2022 [facility]romorph[facility] Hcl 4MG [facility]   10/14/2022 Gabapentin 300MG 60 each 30 PATRICE KUNZ [facility], [facility]   09/20/2022 Hydrocodone Bitartrate/Ac 325MG/5MG 36 each 3 [facility]   09/07/2022 Gabapentin 300MG 60 each 30 [facility], [facility]   09/04/2022 Oxycodone/Acetaminophen 325MG/5MG 30 each 5 ARTEM [facility]   07/28/2022 Gabapentin 300MG 60 each [facility]   07/28/2022 Hydrocodone Bitartrate/Ac 325MG/5MG 10 each 2 JUSTIN BROWN [facility]   07/23/2022 Hydrocodone Bitartrate/Ac 325MG/5MG 8 each 2 DUONG VALLADARES [facility]   07/07/2022 Oxycodone/Acetaminophen 325MG/10MG 8 each 2 BRITTANY ELIZONDO [facility], [facility]   06/29/2022 Oxycodone/Acetaminophen 325MG/10MG 12 each 2 BRITTANY ELIZONDO [facility], [facility]   06/25/2022 Gabapentin 300MG 60 each [facility]    06/24/2022 Hydrocodone Bitartrate/Ac 325MG/5MG 5 each 1 AKE CHANG [facility], [facility]   05/25/2022 Gabapentin 300MG 60 each 30 ZEPATRICE TALLEY [facility], [facility]   05/25/2022 Oxycodone/Acetaminophen 325MG/10MG 12 each 2 [facility]ANY [facility]DO [facility]   05/09/2022 Oxycodone/Acetaminophen 325MG/7.5MG 12 each 2 CURD,IGOR [facility], [facility]   04/27/2022 Oxycodone/Acetaminophen 325MG/10MG 12 [facility]   04/22/2022 Gabapentin 300MG 60 each 30 [facility]UNZ [facility]   04/19/2022 Oxycodone/Acetaminophen 325MG/10MG 20 each 5 [facility], [facility]   04/08/2022 Hydrocodone Bitartrate/Ac 325MG/5MG 10 each 2 SHAMEKA TAYLOR [facility], [facility]   03/17/2022 Gabapentin 300MG 60 each 30 [facility], [facility]   03/15/2022 Oxycodone/Acetaminophen 325MG/10MG 20 each 5 [facility], [facility]   02/15/2022 Gabapentin 300MG 60 each [facility]   02/15/2022 Oxycodone Hydrochloride 15MG 9 each 3 [facility]          This document has been electronically signed by:

## 2022-12-02 LAB
25(OH)D3 SERPL-MCNC: 30.3 NG/ML (ref 30–100)
ALBUMIN SERPL-MCNC: 3.9 G/DL (ref 3.5–5.2)
ALBUMIN UR-MCNC: 25.4 MG/DL
ALBUMIN/GLOB SERPL: 1 G/DL
ALP SERPL-CCNC: 105 U/L (ref 39–117)
ALT SERPL W P-5'-P-CCNC: 9 U/L (ref 1–33)
ANION GAP SERPL CALCULATED.3IONS-SCNC: 13.1 MMOL/L (ref 5–15)
AST SERPL-CCNC: 16 U/L (ref 1–32)
BASOPHILS # BLD AUTO: 0.03 10*3/MM3 (ref 0–0.2)
BASOPHILS NFR BLD AUTO: 0.2 % (ref 0–1.5)
BILIRUB SERPL-MCNC: 0.4 MG/DL (ref 0–1.2)
BUN SERPL-MCNC: 10 MG/DL (ref 6–20)
BUN/CREAT SERPL: 14.9 (ref 7–25)
CALCIUM SPEC-SCNC: 9.6 MG/DL (ref 8.6–10.5)
CHLORIDE SERPL-SCNC: 96 MMOL/L (ref 98–107)
CHOLEST SERPL-MCNC: 157 MG/DL (ref 0–200)
CO2 SERPL-SCNC: 21.9 MMOL/L (ref 22–29)
CREAT SERPL-MCNC: 0.67 MG/DL (ref 0.57–1)
DEPRECATED RDW RBC AUTO: 46.9 FL (ref 37–54)
EGFRCR SERPLBLD CKD-EPI 2021: 116.3 ML/MIN/1.73
EOSINOPHIL # BLD AUTO: 0.14 10*3/MM3 (ref 0–0.4)
EOSINOPHIL NFR BLD AUTO: 1.1 % (ref 0.3–6.2)
ERYTHROCYTE [DISTWIDTH] IN BLOOD BY AUTOMATED COUNT: 15 % (ref 12.3–15.4)
GLOBULIN UR ELPH-MCNC: 3.8 GM/DL
GLUCOSE SERPL-MCNC: 271 MG/DL (ref 65–99)
HBA1C MFR BLD: 8.9 % (ref 4.8–5.6)
HCT VFR BLD AUTO: 29.8 % (ref 34–46.6)
HDLC SERPL-MCNC: 22 MG/DL (ref 40–60)
HGB BLD-MCNC: 9.3 G/DL (ref 12–15.9)
IMM GRANULOCYTES # BLD AUTO: 0.07 10*3/MM3 (ref 0–0.05)
IMM GRANULOCYTES NFR BLD AUTO: 0.6 % (ref 0–0.5)
LDLC SERPL CALC-MCNC: 93 MG/DL (ref 0–100)
LDLC/HDLC SERPL: 3.93 {RATIO}
LYMPHOCYTES # BLD AUTO: 1.4 10*3/MM3 (ref 0.7–3.1)
LYMPHOCYTES NFR BLD AUTO: 11.4 % (ref 19.6–45.3)
MAGNESIUM SERPL-MCNC: 1.9 MG/DL (ref 1.6–2.6)
MCH RBC QN AUTO: 26.6 PG (ref 26.6–33)
MCHC RBC AUTO-ENTMCNC: 31.2 G/DL (ref 31.5–35.7)
MCV RBC AUTO: 85.4 FL (ref 79–97)
MONOCYTES # BLD AUTO: 0.8 10*3/MM3 (ref 0.1–0.9)
MONOCYTES NFR BLD AUTO: 6.5 % (ref 5–12)
NEUTROPHILS NFR BLD AUTO: 80.2 % (ref 42.7–76)
NEUTROPHILS NFR BLD AUTO: 9.86 10*3/MM3 (ref 1.7–7)
NRBC BLD AUTO-RTO: 0 /100 WBC (ref 0–0.2)
PLATELET # BLD AUTO: 418 10*3/MM3 (ref 140–450)
PMV BLD AUTO: 9.8 FL (ref 6–12)
POTASSIUM SERPL-SCNC: 4.5 MMOL/L (ref 3.5–5.2)
PROT SERPL-MCNC: 7.7 G/DL (ref 6–8.5)
RBC # BLD AUTO: 3.49 10*6/MM3 (ref 3.77–5.28)
SODIUM SERPL-SCNC: 131 MMOL/L (ref 136–145)
TRIGL SERPL-MCNC: 243 MG/DL (ref 0–150)
TSH SERPL DL<=0.05 MIU/L-ACNC: 3.89 UIU/ML (ref 0.27–4.2)
VIT B12 BLD-MCNC: 661 PG/ML (ref 211–946)
VLDLC SERPL-MCNC: 42 MG/DL (ref 5–40)
WBC NRBC COR # BLD: 12.3 10*3/MM3 (ref 3.4–10.8)

## 2022-12-03 PROBLEM — E66.01 SEVERE OBESITY (HCC): Status: RESOLVED | Noted: 2018-10-23 | Resolved: 2022-12-03

## 2022-12-09 ENCOUNTER — APPOINTMENT (OUTPATIENT)
Dept: CT IMAGING | Facility: HOSPITAL | Age: 37
End: 2022-12-09

## 2022-12-09 ENCOUNTER — HOSPITAL ENCOUNTER (EMERGENCY)
Facility: HOSPITAL | Age: 37
Discharge: HOME | End: 2022-12-09
Attending: EMERGENCY MEDICINE | Admitting: EMERGENCY MEDICINE

## 2022-12-09 VITALS
WEIGHT: 292 LBS | DIASTOLIC BLOOD PRESSURE: 92 MMHG | TEMPERATURE: 98.8 F | OXYGEN SATURATION: 97 % | BODY MASS INDEX: 49.85 KG/M2 | RESPIRATION RATE: 20 BRPM | HEIGHT: 64 IN | HEART RATE: 99 BPM | SYSTOLIC BLOOD PRESSURE: 143 MMHG

## 2022-12-09 DIAGNOSIS — S30.1XXA ABDOMINAL WALL SEROMA, INITIAL ENCOUNTER: Primary | ICD-10-CM

## 2022-12-09 LAB
ALBUMIN SERPL-MCNC: 3.52 G/DL (ref 3.5–5.2)
ALBUMIN/GLOB SERPL: 0.8 G/DL
ALP SERPL-CCNC: 105 U/L (ref 39–117)
ALT SERPL W P-5'-P-CCNC: 14 U/L (ref 1–33)
ANION GAP SERPL CALCULATED.3IONS-SCNC: 16 MMOL/L (ref 5–15)
AST SERPL-CCNC: 28 U/L (ref 1–32)
BASOPHILS # BLD AUTO: 0.03 10*3/MM3 (ref 0–0.2)
BASOPHILS NFR BLD AUTO: 0.3 % (ref 0–1.5)
BILIRUB SERPL-MCNC: 0.2 MG/DL (ref 0–1.2)
BUN SERPL-MCNC: 14 MG/DL (ref 6–20)
BUN/CREAT SERPL: 19.4 (ref 7–25)
CALCIUM SPEC-SCNC: 9.4 MG/DL (ref 8.6–10.5)
CHLORIDE SERPL-SCNC: 97 MMOL/L (ref 98–107)
CO2 SERPL-SCNC: 21 MMOL/L (ref 22–29)
CREAT SERPL-MCNC: 0.72 MG/DL (ref 0.57–1)
DEPRECATED RDW RBC AUTO: 45.6 FL (ref 37–54)
EGFRCR SERPLBLD CKD-EPI 2021: 111.3 ML/MIN/1.73
EOSINOPHIL # BLD AUTO: 0.2 10*3/MM3 (ref 0–0.4)
EOSINOPHIL NFR BLD AUTO: 2 % (ref 0.3–6.2)
ERYTHROCYTE [DISTWIDTH] IN BLOOD BY AUTOMATED COUNT: 14.8 % (ref 12.3–15.4)
GLOBULIN UR ELPH-MCNC: 4.4 GM/DL
GLUCOSE BLDC GLUCOMTR-MCNC: 305 MG/DL (ref 70–130)
GLUCOSE SERPL-MCNC: 317 MG/DL (ref 65–99)
HCG SERPL QL: NEGATIVE
HCT VFR BLD AUTO: 33 % (ref 34–46.6)
HGB BLD-MCNC: 10.2 G/DL (ref 12–15.9)
HOLD SPECIMEN: NORMAL
IMM GRANULOCYTES # BLD AUTO: 0.06 10*3/MM3 (ref 0–0.05)
IMM GRANULOCYTES NFR BLD AUTO: 0.6 % (ref 0–0.5)
LIPASE SERPL-CCNC: 34 U/L (ref 13–60)
LYMPHOCYTES # BLD AUTO: 1.65 10*3/MM3 (ref 0.7–3.1)
LYMPHOCYTES NFR BLD AUTO: 16.5 % (ref 19.6–45.3)
MCH RBC QN AUTO: 26.2 PG (ref 26.6–33)
MCHC RBC AUTO-ENTMCNC: 30.9 G/DL (ref 31.5–35.7)
MCV RBC AUTO: 84.6 FL (ref 79–97)
MONOCYTES # BLD AUTO: 0.51 10*3/MM3 (ref 0.1–0.9)
MONOCYTES NFR BLD AUTO: 5.1 % (ref 5–12)
NEUTROPHILS NFR BLD AUTO: 7.53 10*3/MM3 (ref 1.7–7)
NEUTROPHILS NFR BLD AUTO: 75.5 % (ref 42.7–76)
NRBC BLD AUTO-RTO: 0 /100 WBC (ref 0–0.2)
PLATELET # BLD AUTO: 364 10*3/MM3 (ref 140–450)
PMV BLD AUTO: 8.7 FL (ref 6–12)
POTASSIUM SERPL-SCNC: 4.4 MMOL/L (ref 3.5–5.2)
PROT SERPL-MCNC: 7.9 G/DL (ref 6–8.5)
RBC # BLD AUTO: 3.9 10*6/MM3 (ref 3.77–5.28)
SODIUM SERPL-SCNC: 134 MMOL/L (ref 136–145)
WBC NRBC COR # BLD: 9.98 10*3/MM3 (ref 3.4–10.8)
WHOLE BLOOD HOLD COAG: NORMAL
WHOLE BLOOD HOLD SPECIMEN: NORMAL

## 2022-12-09 PROCEDURE — 36415 COLL VENOUS BLD VENIPUNCTURE: CPT

## 2022-12-09 PROCEDURE — 25010000002 IOPAMIDOL 61 % SOLUTION: Performed by: EMERGENCY MEDICINE

## 2022-12-09 PROCEDURE — 74177 CT ABD & PELVIS W/CONTRAST: CPT

## 2022-12-09 PROCEDURE — 84703 CHORIONIC GONADOTROPIN ASSAY: CPT | Performed by: EMERGENCY MEDICINE

## 2022-12-09 PROCEDURE — 96376 TX/PRO/DX INJ SAME DRUG ADON: CPT

## 2022-12-09 PROCEDURE — 25010000002 MORPHINE PER 10 MG: Performed by: EMERGENCY MEDICINE

## 2022-12-09 PROCEDURE — 87205 SMEAR GRAM STAIN: CPT | Performed by: EMERGENCY MEDICINE

## 2022-12-09 PROCEDURE — 96375 TX/PRO/DX INJ NEW DRUG ADDON: CPT

## 2022-12-09 PROCEDURE — 85025 COMPLETE CBC W/AUTO DIFF WBC: CPT | Performed by: EMERGENCY MEDICINE

## 2022-12-09 PROCEDURE — 80053 COMPREHEN METABOLIC PANEL: CPT | Performed by: EMERGENCY MEDICINE

## 2022-12-09 PROCEDURE — 87070 CULTURE OTHR SPECIMN AEROBIC: CPT | Performed by: EMERGENCY MEDICINE

## 2022-12-09 PROCEDURE — 83690 ASSAY OF LIPASE: CPT | Performed by: EMERGENCY MEDICINE

## 2022-12-09 PROCEDURE — 82962 GLUCOSE BLOOD TEST: CPT

## 2022-12-09 PROCEDURE — 25010000002 ONDANSETRON PER 1 MG: Performed by: EMERGENCY MEDICINE

## 2022-12-09 PROCEDURE — 99284 EMERGENCY DEPT VISIT MOD MDM: CPT

## 2022-12-09 PROCEDURE — 63710000001 INSULIN REGULAR HUMAN PER 5 UNITS: Performed by: EMERGENCY MEDICINE

## 2022-12-09 PROCEDURE — 96374 THER/PROPH/DIAG INJ IV PUSH: CPT

## 2022-12-09 RX ORDER — ONDANSETRON 2 MG/ML
4 INJECTION INTRAMUSCULAR; INTRAVENOUS ONCE
Status: COMPLETED | OUTPATIENT
Start: 2022-12-09 | End: 2022-12-09

## 2022-12-09 RX ORDER — SODIUM CHLORIDE 0.9 % (FLUSH) 0.9 %
10 SYRINGE (ML) INJECTION AS NEEDED
Status: DISCONTINUED | OUTPATIENT
Start: 2022-12-09 | End: 2022-12-09 | Stop reason: HOSPADM

## 2022-12-09 RX ADMIN — ONDANSETRON 4 MG: 2 INJECTION INTRAMUSCULAR; INTRAVENOUS at 03:57

## 2022-12-09 RX ADMIN — MORPHINE SULFATE 4 MG: 4 INJECTION, SOLUTION INTRAMUSCULAR; INTRAVENOUS at 03:57

## 2022-12-09 RX ADMIN — MORPHINE SULFATE 4 MG: 4 INJECTION, SOLUTION INTRAMUSCULAR; INTRAVENOUS at 05:21

## 2022-12-09 RX ADMIN — INSULIN HUMAN 6 UNITS: 100 INJECTION, SOLUTION PARENTERAL at 05:20

## 2022-12-09 RX ADMIN — IOPAMIDOL 80 ML: 612 INJECTION, SOLUTION INTRAVENOUS at 04:11

## 2022-12-09 NOTE — ED PROVIDER NOTES
Subjective   History of Present Illness  Natalia Arzate is a 39-year-old female brought to the emergency room by EMS with left flank surgical scar drainage, noticed half an hour prior to arrival, after the patient had a coughing spell, at home.      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  Suspect, the patient has developed some fluid collection which was deemed to be post surgical hematoma at the site of the left nephrectomy.      History provided by:  Patient   used: No        Review of Systems   Constitutional: Negative for appetite change, chills, diaphoresis, fatigue and fever.   HENT: Negative for hearing loss, nosebleeds, rhinorrhea, sneezing and sore throat.    Eyes: Negative for photophobia, pain and redness.   Respiratory: Negative for cough, shortness of breath and stridor.    Cardiovascular: Negative for chest pain, palpitations and leg swelling.   Gastrointestinal: Negative for abdominal distention, abdominal pain, diarrhea, nausea and rectal pain.   Endocrine: Negative for cold intolerance and heat intolerance.   Genitourinary: Negative for difficulty urinating, dyspareunia and dysuria.   Musculoskeletal: Negative for arthralgias, back pain, gait problem and joint swelling.   Skin: Positive for wound. Negative for color change and pallor.   Neurological: Negative for dizziness and facial asymmetry.   Psychiatric/Behavioral: Negative for agitation and behavioral problems.   All other systems reviewed and are negative.      Past Medical History:   Diagnosis Date   • A-fib (HCC)    • Abnormal ECG    • Anemia    • Anxiety    • Asthma    • Cancer (HCC)     Ovarian   • Depression    • Diabetes mellitus (HCC)    • DVT (deep venous thrombosis) (HCC)    • Factor 5 Leiden mutation, heterozygous (HCC)    • Fibroid    • GERD (gastroesophageal reflux disease)    • Gout    • H/O abdominal abscess    • History of sepsis    •  History of transfusion    • Hyperlipidemia    • Hypothyroid    • Kidney stone    • Migraines    • Neuropathy    • Ovarian cancer (HCC) 2021   • Ovarian cyst    • PE (pulmonary embolism)    • Polycystic ovary syndrome    • Preeclampsia    • Rh incompatibility    • Stroke (HCC)    • TIA (transient ischemic attack)    • Urinary tract infection    • Varicella        Allergies   Allergen Reactions   • Toradol [Ketorolac Tromethamine] Anaphylaxis and Hives   • Haldol [Haloperidol] Hives and Mental Status Change   • Tramadol Hives and Swelling   • Amoxicillin Hives and Rash   • Penicillins Hives and Rash       Past Surgical History:   Procedure Laterality Date   • ABDOMINAL SURGERY     • CARDIAC CATHETERIZATION     •  SECTION     • CHOLECYSTECTOMY     • COLONOSCOPY     • ENDOSCOPY     • EXTRACORPOREAL SHOCK WAVE LITHOTRIPSY (ESWL) Left 10/22/2021    Procedure: EXTRACORPOREAL SHOCKWAVE LITHOTRIPSY;  Surgeon: Milan Motley MD;  Location: [facility];  Service: Urology;  Laterality: Left;   • LITHOTRIPSY     • RIGHT OOPHORECTOMY     • URETEROSCOPY LASER LITHOTRIPSY WITH STENT INSERTION Left 10/01/2021    Procedure: URETEROSCOPY WITH STENT PLACEMENT;  Surgeon: Milan Motley MD;  Location: [facility];  Service: Urology;  Laterality: Left;   • URETEROSCOPY LASER LITHOTRIPSY WITH STENT INSERTION Left 2022    Procedure: URETEROSCOPY STENT REMOVAL;  Surgeon: Milan Motley MD;  Location: [facility];  Service: Urology;  Laterality: Left;   • URETEROSCOPY LASER LITHOTRIPSY WITH STENT INSERTION Left 2022    Procedure: CYSTOSCOPY RETROGRADE LEFT WITH STENT PLACEMENT;  Surgeon: Milan Motley MD;  Location: [facility];  Service: Urology;  Laterality: Left;       Family History   Problem Relation Age of Onset   • Hypertension Mother    • Diabetes Father    • Hypertension Father    • Heart attack Father    • No Known Problems Sister    • No Known Problems Brother    • No Known Problems  "Son    • No Known Problems Daughter    • No Known Problems Maternal Grandmother    • No Known Problems Maternal Grandfather    • No Known Problems Paternal Grandmother    • No Known Problems Paternal Grandfather    • No Known Problems Cousin    • Rheum arthritis Neg Hx    • Osteoarthritis Neg Hx    • Asthma Neg Hx    • Heart failure Neg Hx    • Hyperlipidemia Neg Hx    • Migraines Neg Hx    • Rashes / Skin problems Neg Hx    • Seizures Neg Hx    • Stroke Neg Hx    • Thyroid disease Neg Hx        Social History     Socioeconomic History   • Marital status:    Tobacco Use   • Smoking status: Never   • Smokeless tobacco: Never   Vaping Use   • Vaping Use: Never used   Substance and Sexual Activity   • Alcohol use: No   • Drug use: Never     Comment: Pt has track marks on right arm. Pt states \"It's from my INR being drawn.\"    • Sexual activity: Not Currently     Partners: Male     Birth control/protection: None           Objective   Physical Exam  Vitals and nursing note reviewed.   Constitutional:       General: She is not in acute distress.     Appearance: Normal appearance. She is well-developed and normal weight. She is not ill-appearing, toxic-appearing or diaphoretic.   HENT:      Head: Normocephalic and atraumatic.      Mouth/Throat:      Mouth: Mucous membranes are moist.   Eyes:      Extraocular Movements: Extraocular movements intact.      Pupils: Pupils are equal, round, and reactive to light.   Cardiovascular:      Rate and Rhythm: Normal rate and regular rhythm.      Heart sounds: Normal heart sounds.   Pulmonary:      Effort: Pulmonary effort is normal.      Breath sounds: Normal breath sounds.   Abdominal:      General: Abdomen is flat. Bowel sounds are normal.      Palpations: Abdomen is soft.      Comments: Left nephrectomy scar has a 1 cm dehiscience, with serous fluid drainage.  There is no prurulent drainage, no skin erythema.    Musculoskeletal:         General: Normal range of motion.      " Cervical back: Normal range of motion and neck supple.   Skin:     General: Skin is warm and dry.      Capillary Refill: Capillary refill takes 2 to 3 seconds.   Neurological:      Mental Status: She is alert and oriented to person, place, and time. Mental status is at baseline.      GCS: GCS eye subscore is 4. GCS verbal subscore is 5. GCS motor subscore is 6.      Cranial Nerves: No cranial nerve deficit.      Sensory: No sensory deficit.      Motor: No weakness.   Psychiatric:         Mood and Affect: Mood normal.         Speech: Speech normal.         Behavior: Behavior normal.         Procedures           ED Course      Upon reevaluation the patient medically stable, no acute distress.  Advised patient results obtained, consistent with a seroma only.  Patient has has upcoming appointment with her urologist and [facility], urged her to keep wound clean and dry, covered, and follow-up with her urologist next previously scheduled.    Exam was completed in the setting of the COVID-19 pandemic.  Counseling: Discussed today's findings, in addition to providing specific details for the plan of care.  Questions are answered and there is agreement with the plan.  Counseling was provided regarding the diagnosis and prognosis. Discussed supportive care, the specific conditions for return, as well as the importance of follow-up. Advised to recheck here immediately with new or worsening symptoms.                                         MDM  Number of Diagnoses or Management Options  Abdominal wall seroma, initial encounter: new and does not require workup     Amount and/or Complexity of Data Reviewed  Clinical lab tests: reviewed and ordered  Tests in the medicine section of CPT®: ordered and reviewed  Decide to obtain previous medical records or to obtain history from someone other than the patient: yes  Review and summarize past medical records: yes  Independent visualization of images, tracings, or specimens:  yes    Risk of Complications, Morbidity, and/or Mortality  Presenting problems: moderate  Diagnostic procedures: moderate  Management options: moderate    Patient Progress  Patient progress: stable      Final diagnoses:   Abdominal wall seroma, initial encounter       ED Disposition  ED Disposition     ED Disposition   Discharge    Condition   Stable    Comment   --             Your urologist at     Schedule an appointment as soon as possible for a visit in 2 weeks  For repeat CT scan of the abdomen/pelvis to assure resolution of left abdomen findings         Medication List      ASK your doctor about these medications    cefdinir 300 MG capsule  Commonly known as: OMNICEF  Take 1 capsule by mouth 2 (Two) Times a Day for 10 days.  Ask about: Should I take this medication?             Ari Dockery MD  12/14/22 9318

## 2022-12-09 NOTE — DISCHARGE INSTRUCTIONS
Please continue all current medications previously prescribed, apply a clean dressing over the left surgical wound, allowing all the serous fluid to drain out, and finally allow the wound to heal in the end.    Schedule follow-up appointment with [facility] urologist in 2 weeks for reevaluation, and repeat CT scan, as recommended by the radiologist to make sure that the fluid in the left upper abdomen has completely resolved.    If any new/acute symptoms or worsening of current presentation please go to the closest urgent care or emergency room for reevaluation.  
FLACC

## 2022-12-12 LAB
BACTERIA SPEC AEROBE CULT: NORMAL
GRAM STN SPEC: NORMAL
GRAM STN SPEC: NORMAL

## 2022-12-14 NOTE — ED PROVIDER NOTES
Subjective   History of Present Illness  Natalia Arzate is a 37-year-old female brought in to the emergency room by POV complaining with a nonproductive cough, fever, chills, body aches for the past 2 days, no social left upper quadrant abdominal pain, as well patient denies any nausea, vomiting, diarrhea or dysuria.  No recent travel no recent exposure to sick contacts.    History provided by:  Patient   used: No        Review of Systems   Constitutional: Positive for chills, fatigue and fever. Negative for appetite change and diaphoresis.   HENT: Negative for hearing loss, nosebleeds, rhinorrhea, sneezing and sore throat.    Eyes: Negative for photophobia, pain and redness.   Respiratory: Positive for cough. Negative for shortness of breath and stridor.    Cardiovascular: Negative for chest pain, palpitations and leg swelling.   Gastrointestinal: Positive for abdominal pain ( LUQ). Negative for abdominal distention, diarrhea, nausea and rectal pain.   Endocrine: Negative for cold intolerance and heat intolerance.   Genitourinary: Negative for difficulty urinating, dyspareunia and dysuria.   Musculoskeletal: Negative for arthralgias, back pain, gait problem and joint swelling.   Skin: Negative for color change and pallor.   Neurological: Negative for dizziness and facial asymmetry.   Psychiatric/Behavioral: Negative for agitation and behavioral problems.   All other systems reviewed and are negative.      Past Medical History:   Diagnosis Date   • A-fib (HCC)    • Abnormal ECG    • Anemia    • Anxiety    • Asthma    • Cancer (HCC)     Ovarian   • Depression    • Diabetes mellitus (HCC)    • DVT (deep venous thrombosis) (HCC)    • Factor 5 Leiden mutation, heterozygous (HCC)    • Fibroid    • GERD (gastroesophageal reflux disease)    • Gout    • H/O abdominal abscess    • History of sepsis    • History of transfusion    • Hyperlipidemia    • Hypothyroid    • Kidney stone    • Migraines    •  Neuropathy    • Ovarian cancer (HCC) 2021   • Ovarian cyst    • PE (pulmonary embolism)    • Polycystic ovary syndrome    • Preeclampsia    • Rh incompatibility    • Stroke (HCC)    • TIA (transient ischemic attack)    • Urinary tract infection    • Varicella        Allergies   Allergen Reactions   • Toradol [Ketorolac Tromethamine] Anaphylaxis and Hives   • Haldol [Haloperidol] Hives and Mental Status Change   • Tramadol Hives and Swelling   • Amoxicillin Hives and Rash   • Penicillins Hives and Rash       Past Surgical History:   Procedure Laterality Date   • ABDOMINAL SURGERY     • CARDIAC CATHETERIZATION     •  SECTION     • CHOLECYSTECTOMY     • COLONOSCOPY     • ENDOSCOPY     • EXTRACORPOREAL SHOCK WAVE LITHOTRIPSY (ESWL) Left 10/22/2021    Procedure: EXTRACORPOREAL SHOCKWAVE LITHOTRIPSY;  Surgeon: Milan Motley MD;  Location: [facility];  Service: Urology;  Laterality: Left;   • LITHOTRIPSY     • RIGHT OOPHORECTOMY     • URETEROSCOPY LASER LITHOTRIPSY WITH STENT INSERTION Left 10/01/2021    Procedure: URETEROSCOPY WITH STENT PLACEMENT;  Surgeon: Milan Motley MD;  Location: [facility];  Service: Urology;  Laterality: Left;   • URETEROSCOPY LASER LITHOTRIPSY WITH STENT INSERTION Left 2022    Procedure: URETEROSCOPY STENT REMOVAL;  Surgeon: Milan Motley MD;  Location: [facility];  Service: Urology;  Laterality: Left;   • URETEROSCOPY LASER LITHOTRIPSY WITH STENT INSERTION Left 2022    Procedure: CYSTOSCOPY RETROGRADE LEFT WITH STENT PLACEMENT;  Surgeon: Milan Motley MD;  Location: [facility];  Service: Urology;  Laterality: Left;       Family History   Problem Relation Age of Onset   • Hypertension Mother    • Diabetes Father    • Hypertension Father    • Heart attack Father    • No Known Problems Sister    • No Known Problems Brother    • No Known Problems Son    • No Known Problems Daughter    • No Known Problems Maternal Grandmother    • No Known  "Problems Maternal Grandfather    • No Known Problems Paternal Grandmother    • No Known Problems Paternal Grandfather    • No Known Problems Cousin    • Rheum arthritis Neg Hx    • Osteoarthritis Neg Hx    • Asthma Neg Hx    • Heart failure Neg Hx    • Hyperlipidemia Neg Hx    • Migraines Neg Hx    • Rashes / Skin problems Neg Hx    • Seizures Neg Hx    • Stroke Neg Hx    • Thyroid disease Neg Hx        Social History     Socioeconomic History   • Marital status:    Tobacco Use   • Smoking status: Never   • Smokeless tobacco: Never   Vaping Use   • Vaping Use: Never used   Substance and Sexual Activity   • Alcohol use: No   • Drug use: Never     Comment: Pt has track marks on right arm. Pt states \"It's from my INR being drawn.\"    • Sexual activity: Not Currently     Partners: Male     Birth control/protection: None           Objective   Physical Exam  Vitals and nursing note reviewed.   Constitutional:       General: She is not in acute distress.     Appearance: Normal appearance. She is well-developed and normal weight. She is not ill-appearing, toxic-appearing or diaphoretic.   HENT:      Head: Normocephalic.      Mouth/Throat:      Mouth: Mucous membranes are moist.   Eyes:      Extraocular Movements: Extraocular movements intact.      Pupils: Pupils are equal, round, and reactive to light.   Cardiovascular:      Rate and Rhythm: Normal rate and regular rhythm.      Pulses: Normal pulses.      Heart sounds: Normal heart sounds.   Pulmonary:      Effort: Pulmonary effort is normal.      Breath sounds: Normal breath sounds.   Abdominal:      General: Abdomen is flat. Bowel sounds are normal.      Palpations: Abdomen is soft.      Tenderness: There is abdominal tenderness ( LUQ). There is no guarding or rebound.   Musculoskeletal:         General: Normal range of motion.      Cervical back: Normal range of motion and neck supple.   Skin:     General: Skin is warm and dry.      Capillary Refill: Capillary " refill takes 2 to 3 seconds.   Neurological:      Mental Status: She is alert and oriented to person, place, and time. Mental status is at baseline.      GCS: GCS eye subscore is 4. GCS verbal subscore is 5. GCS motor subscore is 6.      Cranial Nerves: No cranial nerve deficit.      Sensory: No sensory deficit.      Motor: No weakness.   Psychiatric:         Mood and Affect: Mood normal.         Speech: Speech normal.         Behavior: Behavior normal.         Procedures           ED Course                                           MDM    Final diagnoses:   Abdominal wall seroma, initial encounter       ED Disposition  ED Disposition     ED Disposition   Discharge    Condition   Stable    Comment   --             Your urologist at     Schedule an appointment as soon as possible for a visit in 2 weeks  For repeat CT scan of the abdomen/pelvis to assure resolution of left abdomen findings         Medication List      ASK your doctor about these medications    cefdinir 300 MG capsule  Commonly known as: OMNICEF  Take 1 capsule by mouth 2 (Two) Times a Day for 10 days.  Ask about: Should I take this medication?

## 2022-12-18 ENCOUNTER — HOSPITAL ENCOUNTER (EMERGENCY)
Facility: HOSPITAL | Age: 37
Discharge: HOME | End: 2022-12-18
Attending: STUDENT IN AN ORGANIZED HEALTH CARE EDUCATION/TRAINING PROGRAM | Admitting: STUDENT IN AN ORGANIZED HEALTH CARE EDUCATION/TRAINING PROGRAM

## 2022-12-18 VITALS
SYSTOLIC BLOOD PRESSURE: 148 MMHG | WEIGHT: 287 LBS | RESPIRATION RATE: 18 BRPM | BODY MASS INDEX: 49 KG/M2 | HEIGHT: 64 IN | HEART RATE: 100 BPM | OXYGEN SATURATION: 97 % | TEMPERATURE: 98.4 F | DIASTOLIC BLOOD PRESSURE: 98 MMHG

## 2022-12-18 DIAGNOSIS — R10.9 FLANK PAIN: Primary | ICD-10-CM

## 2022-12-18 LAB
ALBUMIN SERPL-MCNC: 3.54 G/DL (ref 3.5–5.2)
ALBUMIN/GLOB SERPL: 0.8 G/DL
ALP SERPL-CCNC: 124 U/L (ref 39–117)
ALT SERPL W P-5'-P-CCNC: 15 U/L (ref 1–33)
ANION GAP SERPL CALCULATED.3IONS-SCNC: 15.7 MMOL/L (ref 5–15)
AST SERPL-CCNC: 32 U/L (ref 1–32)
BACTERIA UR QL AUTO: ABNORMAL /HPF
BASOPHILS # BLD AUTO: 0.07 10*3/MM3 (ref 0–0.2)
BASOPHILS NFR BLD AUTO: 0.6 % (ref 0–1.5)
BILIRUB SERPL-MCNC: 0.4 MG/DL (ref 0–1.2)
BILIRUB UR QL STRIP: NEGATIVE
BUN SERPL-MCNC: 21 MG/DL (ref 6–20)
BUN/CREAT SERPL: 23.1 (ref 7–25)
CALCIUM SPEC-SCNC: 9.7 MG/DL (ref 8.6–10.5)
CHLORIDE SERPL-SCNC: 94 MMOL/L (ref 98–107)
CLARITY UR: CLEAR
CO2 SERPL-SCNC: 20.3 MMOL/L (ref 22–29)
COLOR UR: YELLOW
CREAT SERPL-MCNC: 0.91 MG/DL (ref 0.57–1)
DEPRECATED RDW RBC AUTO: 48.2 FL (ref 37–54)
EGFRCR SERPLBLD CKD-EPI 2021: 84 ML/MIN/1.73
EOSINOPHIL # BLD AUTO: 0.12 10*3/MM3 (ref 0–0.4)
EOSINOPHIL NFR BLD AUTO: 1.1 % (ref 0.3–6.2)
ERYTHROCYTE [DISTWIDTH] IN BLOOD BY AUTOMATED COUNT: 16.2 % (ref 12.3–15.4)
GLOBULIN UR ELPH-MCNC: 4.6 GM/DL
GLUCOSE SERPL-MCNC: 285 MG/DL (ref 65–99)
GLUCOSE UR STRIP-MCNC: ABNORMAL MG/DL
HCT VFR BLD AUTO: 32.5 % (ref 34–46.6)
HGB BLD-MCNC: 10.3 G/DL (ref 12–15.9)
HGB UR QL STRIP.AUTO: NEGATIVE
HYALINE CASTS UR QL AUTO: ABNORMAL /LPF
IMM GRANULOCYTES # BLD AUTO: 0.05 10*3/MM3 (ref 0–0.05)
IMM GRANULOCYTES NFR BLD AUTO: 0.5 % (ref 0–0.5)
KETONES UR QL STRIP: NEGATIVE
LEUKOCYTE ESTERASE UR QL STRIP.AUTO: ABNORMAL
LYMPHOCYTES # BLD AUTO: 1.42 10*3/MM3 (ref 0.7–3.1)
LYMPHOCYTES NFR BLD AUTO: 12.9 % (ref 19.6–45.3)
MCH RBC QN AUTO: 26.7 PG (ref 26.6–33)
MCHC RBC AUTO-ENTMCNC: 31.7 G/DL (ref 31.5–35.7)
MCV RBC AUTO: 84.2 FL (ref 79–97)
MONOCYTES # BLD AUTO: 0.66 10*3/MM3 (ref 0.1–0.9)
MONOCYTES NFR BLD AUTO: 6 % (ref 5–12)
NEUTROPHILS NFR BLD AUTO: 78.9 % (ref 42.7–76)
NEUTROPHILS NFR BLD AUTO: 8.65 10*3/MM3 (ref 1.7–7)
NITRITE UR QL STRIP: NEGATIVE
NRBC BLD AUTO-RTO: 0 /100 WBC (ref 0–0.2)
PH UR STRIP.AUTO: 6 [PH] (ref 5–8)
PLATELET # BLD AUTO: 367 10*3/MM3 (ref 140–450)
PMV BLD AUTO: 9 FL (ref 6–12)
POTASSIUM SERPL-SCNC: 4.5 MMOL/L (ref 3.5–5.2)
PROT SERPL-MCNC: 8.1 G/DL (ref 6–8.5)
PROT UR QL STRIP: ABNORMAL
RBC # BLD AUTO: 3.86 10*6/MM3 (ref 3.77–5.28)
RBC # UR STRIP: ABNORMAL /HPF
REF LAB TEST METHOD: ABNORMAL
SODIUM SERPL-SCNC: 130 MMOL/L (ref 136–145)
SP GR UR STRIP: 1.02 (ref 1–1.03)
SQUAMOUS #/AREA URNS HPF: ABNORMAL /HPF
UROBILINOGEN UR QL STRIP: ABNORMAL
WBC # UR STRIP: ABNORMAL /HPF
WBC NRBC COR # BLD: 10.97 10*3/MM3 (ref 3.4–10.8)

## 2022-12-18 PROCEDURE — 99284 EMERGENCY DEPT VISIT MOD MDM: CPT

## 2022-12-18 PROCEDURE — 81001 URINALYSIS AUTO W/SCOPE: CPT | Performed by: NURSE PRACTITIONER

## 2022-12-18 PROCEDURE — 85025 COMPLETE CBC W/AUTO DIFF WBC: CPT | Performed by: NURSE PRACTITIONER

## 2022-12-18 PROCEDURE — 80053 COMPREHEN METABOLIC PANEL: CPT | Performed by: NURSE PRACTITIONER

## 2022-12-18 PROCEDURE — 96361 HYDRATE IV INFUSION ADD-ON: CPT

## 2022-12-18 PROCEDURE — 25010000002 MORPHINE PER 10 MG: Performed by: NURSE PRACTITIONER

## 2022-12-18 PROCEDURE — 96374 THER/PROPH/DIAG INJ IV PUSH: CPT

## 2022-12-18 PROCEDURE — 87086 URINE CULTURE/COLONY COUNT: CPT | Performed by: NURSE PRACTITIONER

## 2022-12-18 PROCEDURE — 63710000001 PROMETHAZINE PER 25 MG: Performed by: NURSE PRACTITIONER

## 2022-12-18 RX ORDER — PROMETHAZINE HYDROCHLORIDE 25 MG/1
25 TABLET ORAL ONCE
Status: COMPLETED | OUTPATIENT
Start: 2022-12-18 | End: 2022-12-18

## 2022-12-18 RX ORDER — OXYCODONE HYDROCHLORIDE AND ACETAMINOPHEN 5; 325 MG/1; MG/1
1 TABLET ORAL ONCE
Status: COMPLETED | OUTPATIENT
Start: 2022-12-18 | End: 2022-12-18

## 2022-12-18 RX ORDER — SODIUM CHLORIDE 0.9 % (FLUSH) 0.9 %
10 SYRINGE (ML) INJECTION AS NEEDED
Status: DISCONTINUED | OUTPATIENT
Start: 2022-12-18 | End: 2022-12-18 | Stop reason: HOSPADM

## 2022-12-18 RX ADMIN — OXYCODONE HYDROCHLORIDE AND ACETAMINOPHEN 1 TABLET: 5; 325 TABLET ORAL at 11:12

## 2022-12-18 RX ADMIN — SODIUM CHLORIDE 1000 ML: 9 INJECTION, SOLUTION INTRAVENOUS at 11:26

## 2022-12-18 RX ADMIN — OXYCODONE HYDROCHLORIDE AND ACETAMINOPHEN 1 TABLET: 5; 325 TABLET ORAL at 13:32

## 2022-12-18 RX ADMIN — PROMETHAZINE HYDROCHLORIDE 25 MG: 25 TABLET ORAL at 13:32

## 2022-12-18 RX ADMIN — MORPHINE SULFATE 4 MG: 4 INJECTION, SOLUTION INTRAMUSCULAR; INTRAVENOUS at 11:52

## 2022-12-18 NOTE — ED PROVIDER NOTES
Subjective     Flank Pain  Pain location:  R flank  Pain quality: sharp    Pain radiates to:  Does not radiate  Pain severity:  Moderate  Onset quality:  Sudden  Timing:  Constant  Progression:  Worsening  Context: not alcohol use, not eating, not previous surgeries, not sick contacts and not suspicious food intake    Relieved by:  Nothing  Worsened by:  Nothing  Ineffective treatments:  None tried  Associated symptoms: nausea    Associated symptoms: no anorexia, no belching, no constipation, no fatigue, no hematemesis and no hematochezia    Risk factors: no alcohol abuse, no aspirin use, no NSAID use and not pregnant        Review of Systems   Constitutional: Negative.  Negative for fatigue.   HENT: Negative.    Eyes: Negative.    Respiratory: Negative.    Cardiovascular: Negative.    Gastrointestinal: Positive for nausea. Negative for anorexia, constipation, hematemesis and hematochezia.   Endocrine: Negative.    Genitourinary: Positive for flank pain.   Skin: Negative.    Allergic/Immunologic: Negative.    Neurological: Negative.    Hematological: Negative.    Psychiatric/Behavioral: Negative.        Past Medical History:   Diagnosis Date   • A-fib (HCC)    • Abnormal ECG    • Anemia    • Anxiety    • Asthma    • Cancer (HCC)     Ovarian   • Depression    • Diabetes mellitus (HCC)    • DVT (deep venous thrombosis) (HCC)    • Factor 5 Leiden mutation, heterozygous (HCC)    • Fibroid    • GERD (gastroesophageal reflux disease)    • Gout    • H/O abdominal abscess    • History of sepsis    • History of transfusion    • Hyperlipidemia    • Hypothyroid    • Kidney stone    • Migraines    • Neuropathy    • Ovarian cancer (HCC) 02/2021   • Ovarian cyst    • PE (pulmonary embolism)    • Polycystic ovary syndrome    • Preeclampsia    • Rh incompatibility    • Stroke (HCC)    • TIA (transient ischemic attack)    • Urinary tract infection    • Varicella        Allergies   Allergen Reactions   • Toradol [Ketorolac  Tromethamine] Anaphylaxis and Hives   • Haldol [Haloperidol] Hives and Mental Status Change   • Tramadol Hives and Swelling   • Amoxicillin Hives and Rash   • Penicillins Hives and Rash       Past Surgical History:   Procedure Laterality Date   • ABDOMINAL SURGERY     • CARDIAC CATHETERIZATION     •  SECTION     • CHOLECYSTECTOMY     • COLONOSCOPY     • ENDOSCOPY     • EXTRACORPOREAL SHOCK WAVE LITHOTRIPSY (ESWL) Left 10/22/2021    Procedure: EXTRACORPOREAL SHOCKWAVE LITHOTRIPSY;  Surgeon: Milan Motley MD;  Location: [facility];  Service: Urology;  Laterality: Left;   • LITHOTRIPSY     • RIGHT OOPHORECTOMY     • URETEROSCOPY LASER LITHOTRIPSY WITH STENT INSERTION Left 10/01/2021    Procedure: URETEROSCOPY WITH STENT PLACEMENT;  Surgeon: Milan Motley MD;  Location: [facility];  Service: Urology;  Laterality: Left;   • URETEROSCOPY LASER LITHOTRIPSY WITH STENT INSERTION Left 2022    Procedure: URETEROSCOPY STENT REMOVAL;  Surgeon: Milan Motley MD;  Location: [facility];  Service: Urology;  Laterality: Left;   • URETEROSCOPY LASER LITHOTRIPSY WITH STENT INSERTION Left 2022    Procedure: CYSTOSCOPY RETROGRADE LEFT WITH STENT PLACEMENT;  Surgeon: Milan Motley MD;  Location: [facility];  Service: Urology;  Laterality: Left;       Family History   Problem Relation Age of Onset   • Hypertension Mother    • Diabetes Father    • Hypertension Father    • Heart attack Father    • No Known Problems Sister    • No Known Problems Brother    • No Known Problems Son    • No Known Problems Daughter    • No Known Problems Maternal Grandmother    • No Known Problems Maternal Grandfather    • No Known Problems Paternal Grandmother    • No Known Problems Paternal Grandfather    • No Known Problems Cousin    • Rheum arthritis Neg Hx    • Osteoarthritis Neg Hx    • Asthma Neg Hx    • Heart failure Neg Hx    • Hyperlipidemia Neg Hx    • Migraines Neg Hx    • Rashes / Skin problems  "Neg Hx    • Seizures Neg Hx    • Stroke Neg Hx    • Thyroid disease Neg Hx        Social History     Socioeconomic History   • Marital status:    Tobacco Use   • Smoking status: Never   • Smokeless tobacco: Never   Vaping Use   • Vaping Use: Never used   Substance and Sexual Activity   • Alcohol use: No   • Drug use: Never     Comment: Pt has track marks on right arm. Pt states \"It's from my INR being drawn.\"    • Sexual activity: Not Currently     Partners: Male     Birth control/protection: None           Objective   Physical Exam  Vitals and nursing note reviewed.   Constitutional:       Appearance: She is well-developed.   HENT:      Head: Normocephalic.      Right Ear: External ear normal.      Left Ear: External ear normal.   Eyes:      Conjunctiva/sclera: Conjunctivae normal.      Pupils: Pupils are equal, round, and reactive to light.   Cardiovascular:      Rate and Rhythm: Normal rate and regular rhythm.      Heart sounds: Normal heart sounds.   Pulmonary:      Effort: Pulmonary effort is normal.      Breath sounds: Normal breath sounds.   Abdominal:      General: Bowel sounds are normal.      Palpations: Abdomen is soft.   Musculoskeletal:         General: Normal range of motion.      Cervical back: Normal range of motion and neck supple.   Skin:     General: Skin is warm and dry.      Capillary Refill: Capillary refill takes less than 2 seconds.   Neurological:      Mental Status: She is alert and oriented to person, place, and time.   Psychiatric:         Behavior: Behavior normal.         Thought Content: Thought content normal.         Procedures           ED Course                                           MDM    Final diagnoses:   Flank pain       ED Disposition  ED Disposition     ED Disposition   Discharge    Condition   Stable    Comment   --             Eddie Latif, APRN  [address] [id number] [phone]               Medication List      No changes were made to " your prescriptions during this visit.          Charly Fsiher, APRN  12/18/22 0815

## 2022-12-18 NOTE — DISCHARGE INSTRUCTIONS
Follow up with your primary care provider     Return to the emergency room for worsening symptoms.

## 2022-12-19 ENCOUNTER — OFFICE VISIT (OUTPATIENT)
Dept: ONCOLOGY | Facility: CLINIC | Age: 37
End: 2022-12-19

## 2022-12-19 VITALS
WEIGHT: 293 LBS | TEMPERATURE: 97.7 F | HEIGHT: 64 IN | SYSTOLIC BLOOD PRESSURE: 146 MMHG | DIASTOLIC BLOOD PRESSURE: 82 MMHG | OXYGEN SATURATION: 97 % | BODY MASS INDEX: 50.02 KG/M2 | RESPIRATION RATE: 18 BRPM | HEART RATE: 120 BPM

## 2022-12-19 DIAGNOSIS — D64.9 NORMOCYTIC ANEMIA: ICD-10-CM

## 2022-12-19 DIAGNOSIS — I95.89 OTHER SPECIFIED HYPOTENSION: ICD-10-CM

## 2022-12-19 DIAGNOSIS — D68.51 FACTOR V LEIDEN: Primary | ICD-10-CM

## 2022-12-19 LAB
ALBUMIN SERPL-MCNC: 3.67 G/DL (ref 3.5–5.2)
ALBUMIN/GLOB SERPL: 0.8 G/DL
ALP SERPL-CCNC: 127 U/L (ref 39–117)
ALT SERPL W P-5'-P-CCNC: 15 U/L (ref 1–33)
ANION GAP SERPL CALCULATED.3IONS-SCNC: 14.2 MMOL/L (ref 5–15)
AST SERPL-CCNC: 35 U/L (ref 1–32)
BACTERIA SPEC AEROBE CULT: NO GROWTH
BASOPHILS # BLD AUTO: 0.04 10*3/MM3 (ref 0–0.2)
BASOPHILS NFR BLD AUTO: 0.4 % (ref 0–1.5)
BILIRUB SERPL-MCNC: 0.5 MG/DL (ref 0–1.2)
BUN SERPL-MCNC: 20 MG/DL (ref 6–20)
BUN/CREAT SERPL: 27.4 (ref 7–25)
CALCIUM SPEC-SCNC: 9.6 MG/DL (ref 8.6–10.5)
CHLORIDE SERPL-SCNC: 97 MMOL/L (ref 98–107)
CO2 SERPL-SCNC: 20.8 MMOL/L (ref 22–29)
CREAT SERPL-MCNC: 0.73 MG/DL (ref 0.57–1)
DEPRECATED RDW RBC AUTO: 49.7 FL (ref 37–54)
EGFRCR SERPLBLD CKD-EPI 2021: 109.5 ML/MIN/1.73
EOSINOPHIL # BLD AUTO: 0.14 10*3/MM3 (ref 0–0.4)
EOSINOPHIL NFR BLD AUTO: 1.4 % (ref 0.3–6.2)
ERYTHROCYTE [DISTWIDTH] IN BLOOD BY AUTOMATED COUNT: 16.4 % (ref 12.3–15.4)
FERRITIN SERPL-MCNC: 264.2 NG/ML (ref 13–150)
GLOBULIN UR ELPH-MCNC: 4.5 GM/DL
GLUCOSE SERPL-MCNC: 184 MG/DL (ref 65–99)
HCT VFR BLD AUTO: 32.6 % (ref 34–46.6)
HGB BLD-MCNC: 10.2 G/DL (ref 12–15.9)
IMM GRANULOCYTES # BLD AUTO: 0.03 10*3/MM3 (ref 0–0.05)
IMM GRANULOCYTES NFR BLD AUTO: 0.3 % (ref 0–0.5)
IRON 24H UR-MRATE: 30 MCG/DL (ref 37–145)
IRON SATN MFR SERPL: 8 % (ref 20–50)
LYMPHOCYTES # BLD AUTO: 1.31 10*3/MM3 (ref 0.7–3.1)
LYMPHOCYTES NFR BLD AUTO: 12.8 % (ref 19.6–45.3)
MCH RBC QN AUTO: 26.4 PG (ref 26.6–33)
MCHC RBC AUTO-ENTMCNC: 31.3 G/DL (ref 31.5–35.7)
MCV RBC AUTO: 84.5 FL (ref 79–97)
MONOCYTES # BLD AUTO: 0.58 10*3/MM3 (ref 0.1–0.9)
MONOCYTES NFR BLD AUTO: 5.7 % (ref 5–12)
NEUTROPHILS NFR BLD AUTO: 79.4 % (ref 42.7–76)
NEUTROPHILS NFR BLD AUTO: 8.1 10*3/MM3 (ref 1.7–7)
NRBC BLD AUTO-RTO: 0 /100 WBC (ref 0–0.2)
PLATELET # BLD AUTO: 342 10*3/MM3 (ref 140–450)
PMV BLD AUTO: 8.8 FL (ref 6–12)
POTASSIUM SERPL-SCNC: 4.3 MMOL/L (ref 3.5–5.2)
PROT SERPL-MCNC: 8.2 G/DL (ref 6–8.5)
RBC # BLD AUTO: 3.86 10*6/MM3 (ref 3.77–5.28)
SODIUM SERPL-SCNC: 132 MMOL/L (ref 136–145)
TIBC SERPL-MCNC: 367 MCG/DL (ref 298–536)
TRANSFERRIN SERPL-MCNC: 246 MG/DL (ref 200–360)
WBC NRBC COR # BLD: 10.2 10*3/MM3 (ref 3.4–10.8)

## 2022-12-19 PROCEDURE — 84466 ASSAY OF TRANSFERRIN: CPT | Performed by: INTERNAL MEDICINE

## 2022-12-19 PROCEDURE — 80053 COMPREHEN METABOLIC PANEL: CPT | Performed by: INTERNAL MEDICINE

## 2022-12-19 PROCEDURE — 85025 COMPLETE CBC W/AUTO DIFF WBC: CPT | Performed by: INTERNAL MEDICINE

## 2022-12-19 PROCEDURE — 82728 ASSAY OF FERRITIN: CPT | Performed by: INTERNAL MEDICINE

## 2022-12-19 PROCEDURE — 99214 OFFICE O/P EST MOD 30 MIN: CPT | Performed by: INTERNAL MEDICINE

## 2022-12-19 PROCEDURE — 85306 CLOT INHIBIT PROT S FREE: CPT | Performed by: INTERNAL MEDICINE

## 2022-12-19 PROCEDURE — 83540 ASSAY OF IRON: CPT | Performed by: INTERNAL MEDICINE

## 2022-12-19 PROCEDURE — 85305 CLOT INHIBIT PROT S TOTAL: CPT | Performed by: INTERNAL MEDICINE

## 2022-12-19 PROCEDURE — 85302 CLOT INHIBIT PROT C ANTIGEN: CPT | Performed by: INTERNAL MEDICINE

## 2022-12-19 NOTE — PROGRESS NOTES
Natalia Arzate  [id number]  1985 12/19/2022      Referring Provider:   YEIMI Frausto    Reason for Follow Up:   Factor V Leiden mutation  Deep venous thrombosis (DVT)    Chief Complaint:  Factor V Leiden mutation      History of Present Illness   Natalia Arzate is a very pleasant 37 y.o.  female who presents in follow up appointment for further management and evaluation of Factor V Leiden mutation.     She was previously evaluated by Dr. De Dios in 2013 when she was diagnosed with right lower extremity DVT and bilateral pulmonary embolism. As per his clinic note that was dated on 4/25/14 her workup was positive for heterozygous Factor V Leiden and MTHFR gene mutation. Her thrombosis was felt to be provoked as she was on hormone replacement therapy at the time and Coumadin was recommended for one year. This was later changed to Xarelto by her PCP. She reports being compliant with Xarelto and developing a lower extremity DVT in 2015 at which time she was changed back to Coumadin.     However she has been without Coumadin for one year as she did not follow with her primary provider as scheduled. She presented to [facility] ED after a fall at home with complaints of fevers. She was found to have left pyelonephritis complicated by multiple abscesses as well as superinfection of left iliopsoas hematoma and was transferred to the [facility] on December 26th 2021 where she had a prolonged hospitalizaton. She developed acute respiratory failure and was intubated and also required CRRT/HD. During that admission abscesses were positive for MRSA and E. Coli and she had a left ureteral stent and drain placed which has been removed. She had a repeat CT scan that showed a decrease in size of the left RP collection on 2/7/22. She had an IVC filter placed during her admission and was initially started on heparin drip due to her hematoma and later bivalirudin due to possible heparin resistance which was  held intermittently for procedures or bleeding concerns. This was transitioned to Lovenox and ultimately to Eliquis which she is tolerating well. She also developed an acute/subacute right cerebellar ischemic stroke which was noted on 1/26/22 CT scan compared to CT scan one month prior. Neurology was consulted and recommended continuing anticoagulation. Echo was without intracardiac thrombosis. Since then she has had multiple [facility] ED visits for ongoing flank pain and UTIs and is currently following with Dr. Motley.       Interim History:  She presents today for follow up and continues to be on Eliquis is tolerating this well. Since her last visit she underwent a left nephrectomy at  with Dr. Kailash Lord for recurrent infections and pain. She continues to experience pain and has been treated for one UTI since nephrectomy. She denies of any bleeding.       The following portions of the patient's history were reviewed and updated as appropriate: allergies, current medications, past family history, past medical history, past social history, past surgical history and problem list.    Allergies   Allergen Reactions   • Toradol [Ketorolac Tromethamine] Anaphylaxis and Hives   • Haldol [Haloperidol] Hives and Mental Status Change   • Tramadol Hives and Swelling   • Amoxicillin Hives and Rash   • Penicillins Hives and Rash       Past Medical History:   Diagnosis Date   • A-fib (HCC)    • Abnormal ECG    • Anemia    • Anxiety    • Asthma    • Cancer (HCC)     Ovarian   • Depression    • Diabetes mellitus (HCC)    • DVT (deep venous thrombosis) (HCC)    • Factor 5 Leiden mutation, heterozygous (HCC)    • Fibroid    • GERD (gastroesophageal reflux disease)    • Gout    • H/O abdominal abscess    • History of sepsis    • History of transfusion    • Hyperlipidemia    • Hypothyroid    • Kidney stone    • Migraines    • Neuropathy    • Ovarian cancer (HCC) 02/2021   • Ovarian cyst    • PE (pulmonary embolism)    • Polycystic  "ovary syndrome    • Preeclampsia    • Rh incompatibility    • Stroke (HCC)    • TIA (transient ischemic attack)    • Urinary tract infection    • Varicella    ALVARO at [facility] secondary to ovarian torsion      Past Surgical History:   Procedure Laterality Date   • ABDOMINAL SURGERY     • CARDIAC CATHETERIZATION     •  SECTION     • CHOLECYSTECTOMY     • COLONOSCOPY     • ENDOSCOPY     • EXTRACORPOREAL SHOCK WAVE LITHOTRIPSY (ESWL) Left 10/22/2021    Procedure: EXTRACORPOREAL SHOCKWAVE LITHOTRIPSY;  Surgeon: Milan Motley MD;  Location[facility];  Service: Urology;  Laterality: Left;   • LITHOTRIPSY     • RIGHT OOPHORECTOMY     • URETEROSCOPY LASER LITHOTRIPSY WITH STENT INSERTION Left 10/01/2021    Procedure: URETEROSCOPY WITH STENT PLACEMENT;  Surgeon: Milan Motley MD;  Location: [facility] OR;  Service: Urology;  Laterality: Left;   • URETEROSCOPY LASER LITHOTRIPSY WITH STENT INSERTION Left 2022    Procedure: URETEROSCOPY STENT REMOVAL;  Surgeon: Milan Motley MD;  Location: [facility] OR;  Service: Urology;  Laterality: Left;   • URETEROSCOPY LASER LITHOTRIPSY WITH STENT INSERTION Left 2022    Procedure: CYSTOSCOPY RETROGRADE LEFT WITH STENT PLACEMENT;  Surgeon: Milan Motley MD;  Location: [facility] OR;  Service: Urology;  Laterality: Left;           Social History     Socioeconomic History   • Marital status:    Tobacco Use   • Smoking status: Never   • Smokeless tobacco: Never   Vaping Use   • Vaping Use: Never used   Substance and Sexual Activity   • Alcohol use: No   • Drug use: Never     Comment: Pt has track marks on right arm. Pt states \"It's from my INR being drawn.\"    • Sexual activity: Not Currently     Partners: Male     Birth control/protection: None           Family History   Problem Relation Age of Onset   • Hypertension Mother    • Diabetes Father    • Hypertension Father    • Heart attack Father    • No Known Problems Sister    • No Known " Problems Brother    • No Known Problems Son    • No Known Problems Daughter    • No Known Problems Maternal Grandmother    • No Known Problems Maternal Grandfather    • No Known Problems Paternal Grandmother    • No Known Problems Paternal Grandfather    • No Known Problems Cousin    • Rheum arthritis Neg Hx    • Osteoarthritis Neg Hx    • Asthma Neg Hx    • Heart failure Neg Hx    • Hyperlipidemia Neg Hx    • Migraines Neg Hx    • Rashes / Skin problems Neg Hx    • Seizures Neg Hx    • Stroke Neg Hx    • Thyroid disease Neg Hx      Father - PE          Current Outpatient Medications:   •  albuterol (PROVENTIL) (2.5 MG/3ML) 0.083% nebulizer solution, Take 2.5 mg by nebulization Every 6 (Six) Hours As Needed for Wheezing or Shortness of Air., Disp: 150 mL, Rfl: 3  •  albuterol sulfate  (90 Base) MCG/ACT inhaler, Inhale 2 puffs Every 4 (Four) Hours As Needed for Wheezing., Disp: 18 g, Rfl: 2  •  allopurinol (ZYLOPRIM) 100 MG tablet, Take 1 tablet by mouth Daily., Disp: 30 tablet, Rfl: 3  •  amLODIPine (NORVASC) 10 MG tablet, Take 1 tablet by mouth Daily., Disp: 30 tablet, Rfl: 2  •  apixaban (ELIQUIS) 5 MG tablet tablet, Take 1 tablet by mouth 2 (Two) Times a Day., Disp: 60 tablet, Rfl: 2  •  azelastine (ASTELIN) 0.1 % nasal spray, 2 sprays both nostrils twice daily as needed, Disp: 1 each, Rfl: 2  •  Continuous Blood Gluc Sensor (Dexcom G6 Sensor), Every 10 (Ten) Days., Disp: 9 each, Rfl: 3  •  Continuous Blood Gluc Transmit (Dexcom G6 Transmitter) misc, 1 Device Daily for 90 days., Disp: 1 each, Rfl: 3  •  cyanocobalamin ([employer] Vitamin B-12) 2000 MCG tablet, Take 1 tablet by mouth Daily., Disp: 30 tablet, Rfl: 3  •  DULoxetine (CYMBALTA) 30 MG capsule, Take 1 capsule by mouth Daily., Disp: 30 capsule, Rfl: 0  •  gabapentin (NEURONTIN) 300 MG capsule, Take 1 capsule by mouth At Night As Needed (neuropathy) for up to 30 days., Disp: 30 capsule, Rfl: 0  •  glucose blood test strip, 1 each by Other route 3 (Three)  Times a Day., Disp: 100 each, Rfl: 5  •  Insulin Glargine (Lantus SoloStar) 100 UNIT/ML injection pen, Maximum daily dose of 75 units., Disp: 24 mL, Rfl: 1  •  Insulin Lispro (humaLOG) 100 UNIT/ML injection, Per sliding scale. Maximum daily dose 50 units, Disp: 15 mL, Rfl: 1  •  Insulin Pen Needle 30G X 8 MM misc, 1 Device 4 (Four) Times a Day., Disp: 200 each, Rfl: 2  •  ipratropium-albuterol (DUO-NEB) 0.5-2.5 mg/3 ml nebulizer, Take 3 mL by nebulization Every 4 (Four) Hours As Needed for Shortness of Air., Disp: 150 mL, Rfl: 2  •  Lancets Micro Thin 33G misc, 1 Device 3 (Three) Times a Day., Disp: 100 each, Rfl: 3  •  metoprolol succinate XL (TOPROL-XL) 50 MG 24 hr tablet, Take 1 tablet by mouth Daily., Disp: 30 tablet, Rfl: 3  •  montelukast (SINGULAIR) 10 MG tablet, Take 1 tablet by mouth Every Night., Disp: 30 tablet, Rfl: 3  •  nystatin (MYCOSTATIN) 384520 UNIT/GM powder, Apply  topically to the appropriate area as directed 3 (Three) Times a Day., Disp: 60 g, Rfl: 1  •  O2 (OXYGEN), Inhale 2 L/min 1 (One) Time., Disp: , Rfl:   •  ondansetron ODT (ZOFRAN-ODT) 4 MG disintegrating tablet, Place 1 tablet on the tongue Every 6 (Six) Hours As Needed for Nausea or Vomiting., Disp: 12 tablet, Rfl: 0  •  polyethylene glycol (MiraLax) 17 g packet, Take 17 g by mouth Daily., Disp: 30 each, Rfl: 3  •  promethazine (PHENERGAN) 25 MG tablet, Take 1 tablet by mouth Every 6 (Six) Hours As Needed for Nausea or Vomiting., Disp: 21 tablet, Rfl: 2  •  promethazine-dextromethorphan (PROMETHAZINE-DM) 6.25-15 MG/5ML syrup, Take 5 mL by mouth 2 (Two) Times a Day As Needed for Cough., Disp: 180 mL, Rfl: 1  •  Respiratory Therapy Supplies (Nebulizer/Tubing/Mouthpiece) kit, 1 each Take As Directed., Disp: 1 each, Rfl: 1  •  Semaglutide, 2 MG/DOSE, 8 MG/3ML solution pen-injector, Inject 2 mg under the skin into the appropriate area as directed 1 (One) Time Per Week., Disp: 3 mL, Rfl: 3  •  vitamin D (ERGOCALCIFEROL) 1.25 MG (23174 UT)  capsule capsule, Take 1 capsule by mouth 1 (One) Time Per Week. Prior to [facility] Admission, Patient was on:  takes on saturday, Disp: 5 capsule, Rfl: 3  No current facility-administered medications for this visit.        Review of Systems  Pertinent positives are listed as per history of present of illness, all other systems reviewed and are negative. Chronic abdominal pain.         Physical Exam   Physical Exam  Vital Signs: These were reviewed and listed as per patient’s electronic medical chart  Vitals:    12/19/22 1325   BP: 146/82   Pulse: 120   Resp: 18   Temp: 97.7 °F (36.5 °C)   SpO2: 97%     General: Awake, alert and oriented, in no distress, in a wheelchair  HEENT: Head is atraumatic, normocephalic, extraocular movements full, no scleral icterus  Neck: Supple, no JVD, lymphadenopathy or masses  Cardiovascular: Regular rate and rhythm without murmurs, rubs or gallops  Pulmonary: Nonlabored, clear to auscultation bilaterally, no wheezing  Abdomen: Soft, nontender, nondistended, normal active bowel sounds present, no organomegaly  Extremities: No clubbing, cyanosis or edema  Lymph: No cervical, supraclavicular adenopathy  Neurologic: Mental status as above, alert, awake and oriented, grossly nonfocal exam  Skin: Warm, dry, intact            Pain Score:  Pain Score    12/19/22 1325   PainSc:   9   PainLoc: Comment: left side       PHQ-Score Total:  PHQ-9 Total Score:          Labs / Studies:                     Office Visit on 12/19/2022   Component Date Value   • Glucose 12/19/2022 184 (H)    • BUN 12/19/2022 20    • Creatinine 12/19/2022 0.73    • Sodium 12/19/2022 132 (L)    • Potassium 12/19/2022 4.3    • Chloride 12/19/2022 97 (L)    • CO2 12/19/2022 20.8 (L)    • Calcium 12/19/2022 9.6    • Total Protein 12/19/2022 8.2    • Albumin 12/19/2022 3.67    • ALT (SGPT) 12/19/2022 15    • AST (SGOT) 12/19/2022 35 (H)    • Alkaline Phosphatase 12/19/2022 127 (H)    • Total Bilirubin 12/19/2022 0.5    • Globulin  12/19/2022 4.5    • A/G Ratio 12/19/2022 0.8    • BUN/Creatinine Ratio 12/19/2022 27.4 (H)    • Anion Gap 12/19/2022 14.2    • eGFR 12/19/2022 109.5    • WBC 12/19/2022 10.20    • RBC 12/19/2022 3.86    • Hemoglobin 12/19/2022 10.2 (L)    • Hematocrit 12/19/2022 32.6 (L)    • MCV 12/19/2022 84.5    • MCH 12/19/2022 26.4 (L)    • MCHC 12/19/2022 31.3 (L)    • RDW 12/19/2022 16.4 (H)    • RDW-SD 12/19/2022 49.7    • MPV 12/19/2022 8.8    • Platelets 12/19/2022 342    • Neutrophil % 12/19/2022 79.4 (H)    • Lymphocyte % 12/19/2022 12.8 (L)    • Monocyte % 12/19/2022 5.7    • Eosinophil % 12/19/2022 1.4    • Basophil % 12/19/2022 0.4    • Immature Grans % 12/19/2022 0.3    • Neutrophils, Absolute 12/19/2022 8.10 (H)    • Lymphocytes, Absolute 12/19/2022 1.31    • Monocytes, Absolute 12/19/2022 0.58    • Eosinophils, Absolute 12/19/2022 0.14    • Basophils, Absolute 12/19/2022 0.04    • Immature Grans, Absolute 12/19/2022 0.03    • nRBC 12/19/2022 0.0    Admission on 12/18/2022, Discharged on 12/18/2022   Component Date Value   • Glucose 12/18/2022 285 (H)    • BUN 12/18/2022 21 (H)    • Creatinine 12/18/2022 0.91    • Sodium 12/18/2022 130 (L)    • Potassium 12/18/2022 4.5    • Chloride 12/18/2022 94 (L)    • CO2 12/18/2022 20.3 (L)    • Calcium 12/18/2022 9.7    • Total Protein 12/18/2022 8.1    • Albumin 12/18/2022 3.54    • ALT (SGPT) 12/18/2022 15    • AST (SGOT) 12/18/2022 32    • Alkaline Phosphatase 12/18/2022 124 (H)    • Total Bilirubin 12/18/2022 0.4    • Globulin 12/18/2022 4.6    • A/G Ratio 12/18/2022 0.8    • BUN/Creatinine Ratio 12/18/2022 23.1    • Anion Gap 12/18/2022 15.7 (H)    • eGFR 12/18/2022 84.0    • Color, UA 12/18/2022 Yellow    • Appearance, UA 12/18/2022 Clear    • pH, UA 12/18/2022 6.0    • Specific Gravity, UA 12/18/2022 1.024    • Glucose, UA 12/18/2022 250 mg/dL (1+) (A)    • Ketones, UA 12/18/2022 Negative    • Bilirubin, UA 12/18/2022 Negative    • Blood, UA 12/18/2022 Negative    •  Protein, UA 12/18/2022 30 mg/dL (1+) (A)    • Leuk Esterase, UA 12/18/2022 Trace (A)    • Nitrite, UA 12/18/2022 Negative    • Urobilinogen, UA 12/18/2022 1.0 E.U./dL    • WBC 12/18/2022 10.97 (H)    • RBC 12/18/2022 3.86    • Hemoglobin 12/18/2022 10.3 (L)    • Hematocrit 12/18/2022 32.5 (L)    • MCV 12/18/2022 84.2    • MCH 12/18/2022 26.7    • MCHC 12/18/2022 31.7    • RDW 12/18/2022 16.2 (H)    • RDW-SD 12/18/2022 48.2    • MPV 12/18/2022 9.0    • Platelets 12/18/2022 367    • Neutrophil % 12/18/2022 78.9 (H)    • Lymphocyte % 12/18/2022 12.9 (L)    • Monocyte % 12/18/2022 6.0    • Eosinophil % 12/18/2022 1.1    • Basophil % 12/18/2022 0.6    • Immature Grans % 12/18/2022 0.5    • Neutrophils, Absolute 12/18/2022 8.65 (H)    • Lymphocytes, Absolute 12/18/2022 1.42    • Monocytes, Absolute 12/18/2022 0.66    • Eosinophils, Absolute 12/18/2022 0.12    • Basophils, Absolute 12/18/2022 0.07    • Immature Grans, Absolute 12/18/2022 0.05    • nRBC 12/18/2022 0.0    • RBC, UA 12/18/2022 0-2    • WBC, UA 12/18/2022 6-12 (A)    • Bacteria, UA 12/18/2022 None Seen    • Squamous Epithelial Cell* 12/18/2022 0-2    • Hyaline Casts, UA 12/18/2022 None Seen    • Methodology 12/18/2022 Automated Microscopy    • Urine Culture 12/18/2022 No growth    Admission on 12/09/2022, Discharged on 12/09/2022   Component Date Value   • Wound Culture 12/09/2022 No growth at 3 days    • Gram Stain 12/09/2022 Few (2+) WBCs seen    • Gram Stain 12/09/2022 No No organisms seen    • Glucose 12/09/2022 317 (H)    • BUN 12/09/2022 14    • Creatinine 12/09/2022 0.72    • Sodium 12/09/2022 134 (L)    • Potassium 12/09/2022 4.4    • Chloride 12/09/2022 97 (L)    • CO2 12/09/2022 21.0 (L)    • Calcium 12/09/2022 9.4    • Total Protein 12/09/2022 7.9    • Albumin 12/09/2022 3.52    • ALT (SGPT) 12/09/2022 14    • AST (SGOT) 12/09/2022 28    • Alkaline Phosphatase 12/09/2022 105    • Total Bilirubin 12/09/2022 0.2    • Globulin 12/09/2022 4.4    • A/G  Ratio 12/09/2022 0.8    • BUN/Creatinine Ratio 12/09/2022 19.4    • Anion Gap 12/09/2022 16.0 (H)    • eGFR 12/09/2022 111.3    • Lipase 12/09/2022 34    • Extra Tube 12/09/2022 Hold for add-ons.    • Extra Tube 12/09/2022 hold for add-on    • Extra Tube 12/09/2022 Hold for add-ons.    • WBC 12/09/2022 9.98    • RBC 12/09/2022 3.90    • Hemoglobin 12/09/2022 10.2 (L)    • Hematocrit 12/09/2022 33.0 (L)    • MCV 12/09/2022 84.6    • MCH 12/09/2022 26.2 (L)    • MCHC 12/09/2022 30.9 (L)    • RDW 12/09/2022 14.8    • RDW-SD 12/09/2022 45.6    • MPV 12/09/2022 8.7    • Platelets 12/09/2022 364    • Neutrophil % 12/09/2022 75.5    • Lymphocyte % 12/09/2022 16.5 (L)    • Monocyte % 12/09/2022 5.1    • Eosinophil % 12/09/2022 2.0    • Basophil % 12/09/2022 0.3    • Immature Grans % 12/09/2022 0.6 (H)    • Neutrophils, Absolute 12/09/2022 7.53 (H)    • Lymphocytes, Absolute 12/09/2022 1.65    • Monocytes, Absolute 12/09/2022 0.51    • Eosinophils, Absolute 12/09/2022 0.20    • Basophils, Absolute 12/09/2022 0.03    • Immature Grans, Absolute 12/09/2022 0.06 (H)    • nRBC 12/09/2022 0.0    • HCG Qualitative 12/09/2022 Negative    • Glucose 12/09/2022 305 (H)    Office Visit on 12/01/2022   Component Date Value   • 25 Hydroxy, Vitamin D 12/01/2022 30.3    • Magnesium 12/01/2022 1.9    • Microalbumin, Urine 12/01/2022 25.4    • Vitamin B-12 12/01/2022 661    • ADENOVIRUS, PCR 12/01/2022 Not Detected    • Coronavirus 229E 12/01/2022 Not Detected    • Coronavirus HKU1 12/01/2022 Not Detected    • Coronavirus NL63 12/01/2022 Not Detected    • Coronavirus OC43 12/01/2022 Not Detected    • COVID19 12/01/2022 Not Detected    • Human Metapneumovirus 12/01/2022 Not Detected    • Human Rhinovirus/Enterov* 12/01/2022 Not Detected    • Influenza A PCR 12/01/2022 Not Detected    • Influenza B PCR 12/01/2022 Not Detected    • Parainfluenza Virus 1 12/01/2022 Not Detected    • Parainfluenza Virus 2 12/01/2022 Not Detected    • Parainfluenza  Virus 3 12/01/2022 Not Detected    • Parainfluenza Virus 4 12/01/2022 Not Detected    • RSV, PCR 12/01/2022 Not Detected    • Bordetella pertussis pcr 12/01/2022 Not Detected    • Bordetella parapertussis* 12/01/2022 Not Detected    • Chlamydophila pneumoniae* 12/01/2022 Not Detected    • Mycoplasma pneumo by PCR 12/01/2022 Not Detected    • WBC 12/01/2022 12.30 (H)    • RBC 12/01/2022 3.49 (L)    • Hemoglobin 12/01/2022 9.3 (L)    • Hematocrit 12/01/2022 29.8 (L)    • MCV 12/01/2022 85.4    • MCH 12/01/2022 26.6    • MCHC 12/01/2022 31.2 (L)    • RDW 12/01/2022 15.0    • RDW-SD 12/01/2022 46.9    • MPV 12/01/2022 9.8    • Platelets 12/01/2022 418    • Neutrophil % 12/01/2022 80.2 (H)    • Lymphocyte % 12/01/2022 11.4 (L)    • Monocyte % 12/01/2022 6.5    • Eosinophil % 12/01/2022 1.1    • Basophil % 12/01/2022 0.2    • Immature Grans % 12/01/2022 0.6 (H)    • Neutrophils, Absolute 12/01/2022 9.86 (H)    • Lymphocytes, Absolute 12/01/2022 1.40    • Monocytes, Absolute 12/01/2022 0.80    • Eosinophils, Absolute 12/01/2022 0.14    • Basophils, Absolute 12/01/2022 0.03    • Immature Grans, Absolute 12/01/2022 0.07 (H)    • nRBC 12/01/2022 0.0    • Hemoglobin A1C 12/01/2022 8.90 (H)    • Hemoglobin A1C 12/01/2022 9    • Lot Number 12/01/2022 [id number]    • Expiration Date 12/01/2022 4-7-24    Admission on 11/09/2022, Discharged on 11/09/2022   Component Date Value   • Glucose 11/09/2022 270 (H)    • BUN 11/09/2022 16    • Creatinine 11/09/2022 0.60    • Sodium 11/09/2022 131 (L)    • Potassium 11/09/2022 4.6    • Chloride 11/09/2022 95 (L)    • CO2 11/09/2022 20.8 (L)    • Calcium 11/09/2022 10.3    • Total Protein 11/09/2022 8.6 (H)    • Albumin 11/09/2022 4.05    • ALT (SGPT) 11/09/2022 18    • AST (SGOT) 11/09/2022 39 (H)    • Alkaline Phosphatase 11/09/2022 126 (H)    • Total Bilirubin 11/09/2022 0.3    • Globulin 11/09/2022 4.6    • A/G Ratio 11/09/2022 0.9    • BUN/Creatinine Ratio 11/09/2022 26.7 (H)    • Anion  Gap 11/09/2022 15.2 (H)    • eGFR 11/09/2022 119.5    • C-Reactive Protein 11/09/2022 5.95 (H)    • Color, UA 11/09/2022 Yellow    • Appearance, UA 11/09/2022 Clear    • pH, UA 11/09/2022 5.5    • Specific Gravity, UA 11/09/2022 1.022    • Glucose, UA 11/09/2022 500 mg/dL (2+) (A)    • Ketones, UA 11/09/2022 Negative    • Bilirubin, UA 11/09/2022 Negative    • Blood, UA 11/09/2022 Moderate (2+) (A)    • Protein, UA 11/09/2022 100 mg/dL (2+) (A)    • Leuk Esterase, UA 11/09/2022 Small (1+) (A)    • Nitrite, UA 11/09/2022 Negative    • Urobilinogen, UA 11/09/2022 0.2 E.U./dL    • Extra Tube 11/09/2022 Hold for add-ons.    • Extra Tube 11/09/2022 hold for add-on    • Extra Tube 11/09/2022 Hold for add-ons.    • Extra Tube 11/09/2022 Hold for add-ons.    • WBC 11/09/2022 8.07    • RBC 11/09/2022 3.82    • Hemoglobin 11/09/2022 10.6 (L)    • Hematocrit 11/09/2022 33.3 (L)    • MCV 11/09/2022 87.2    • MCH 11/09/2022 27.7    • MCHC 11/09/2022 31.8    • RDW 11/09/2022 14.2    • RDW-SD 11/09/2022 44.1    • MPV 11/09/2022 8.8    • Platelets 11/09/2022 361    • Neutrophil % 11/09/2022 70.5    • Lymphocyte % 11/09/2022 18.2 (L)    • Monocyte % 11/09/2022 7.4    • Eosinophil % 11/09/2022 2.9    • Basophil % 11/09/2022 0.5    • Immature Grans % 11/09/2022 0.5    • Neutrophils, Absolute 11/09/2022 5.69    • Lymphocytes, Absolute 11/09/2022 1.47    • Monocytes, Absolute 11/09/2022 0.60    • Eosinophils, Absolute 11/09/2022 0.23    • Basophils, Absolute 11/09/2022 0.04    • Immature Grans, Absolute 11/09/2022 0.04    • nRBC 11/09/2022 0.0    • RBC, UA 11/09/2022 3-5 (A)    • WBC, UA 11/09/2022 13-20 (A)    • Bacteria, UA 11/09/2022 Trace (A)    • Squamous Epithelial Cell* 11/09/2022 0-2    • Yeast, UA 11/09/2022 Small/1+ Yeast    • Hyaline Casts, UA 11/09/2022 None Seen    • Methodology 11/09/2022 Automated Microscopy    • Urine Culture 11/09/2022 >100,000 CFU/mL Normal Urogenital Margo    Lab on 10/13/2022   Component Date Value    • COVID19 10/13/2022 Not Detected    Infusion on 09/23/2022   Component Date Value   • Glucose 09/23/2022 250 (H)    • BUN 09/23/2022 11    • Creatinine 09/23/2022 0.60    • Sodium 09/23/2022 136    • Potassium 09/23/2022 4.3    • Chloride 09/23/2022 97 (L)    • CO2 09/23/2022 26.3    • Calcium 09/23/2022 9.8    • Total Protein 09/23/2022 7.3    • Albumin 09/23/2022 4.11    • ALT (SGPT) 09/23/2022 19    • AST (SGOT) 09/23/2022 36 (H)    • Alkaline Phosphatase 09/23/2022 114    • Total Bilirubin 09/23/2022 0.4    • Globulin 09/23/2022 3.2    • A/G Ratio 09/23/2022 1.3    • BUN/Creatinine Ratio 09/23/2022 18.3    • Anion Gap 09/23/2022 12.7    • eGFR 09/23/2022 119.5    • Bilirubin, Direct 09/23/2022 <0.2    • WBC 09/23/2022 5.96    • RBC 09/23/2022 3.66 (L)    • Hemoglobin 09/23/2022 11.0 (L)    • Hematocrit 09/23/2022 32.5 (L)    • MCV 09/23/2022 88.8    • MCH 09/23/2022 30.1    • MCHC 09/23/2022 33.8    • RDW 09/23/2022 13.3    • RDW-SD 09/23/2022 43.1    • MPV 09/23/2022 8.9    • Platelets 09/23/2022 246    • Neutrophil % 09/23/2022 57.8    • Lymphocyte % 09/23/2022 26.5    • Monocyte % 09/23/2022 9.9    • Eosinophil % 09/23/2022 5.0    • Basophil % 09/23/2022 0.5    • Immature Grans % 09/23/2022 0.3    • Neutrophils, Absolute 09/23/2022 3.44    • Lymphocytes, Absolute 09/23/2022 1.58    • Monocytes, Absolute 09/23/2022 0.59    • Eosinophils, Absolute 09/23/2022 0.30    • Basophils, Absolute 09/23/2022 0.03    • Immature Grans, Absolute 09/23/2022 0.02    • nRBC 09/23/2022 0.0    Admission on 09/11/2022, Discharged on 09/11/2022   Component Date Value   • Color, UA 09/11/2022 Yellow    • Appearance, UA 09/11/2022 Turbid (A)    • pH, UA 09/11/2022 5.5    • Specific Gravity, UA 09/11/2022 1.023    • Glucose, UA 09/11/2022 >=1000 mg/dL (3+) (A)    • Ketones, UA 09/11/2022 Trace (A)    • Bilirubin, UA 09/11/2022 Negative    • Blood, UA 09/11/2022 Moderate (2+) (A)    • Protein, UA 09/11/2022 >=300 mg/dL (3+) (A)     • Leuk Esterase, UA 09/11/2022 Moderate (2+) (A)    • Nitrite, UA 09/11/2022 Negative    • Urobilinogen, UA 09/11/2022 1.0 E.U./dL    • Glucose 09/11/2022 347 (H)    • BUN 09/11/2022 18    • Creatinine 09/11/2022 0.75    • Sodium 09/11/2022 132 (L)    • Potassium 09/11/2022 4.3    • Chloride 09/11/2022 96 (L)    • CO2 09/11/2022 18.6 (L)    • Calcium 09/11/2022 10.1    • Total Protein 09/11/2022 7.9    • Albumin 09/11/2022 4.64    • ALT (SGPT) 09/11/2022 37 (H)    • AST (SGOT) 09/11/2022 47 (H)    • Alkaline Phosphatase 09/11/2022 128 (H)    • Total Bilirubin 09/11/2022 0.3    • Globulin 09/11/2022 3.3    • A/G Ratio 09/11/2022 1.4    • BUN/Creatinine Ratio 09/11/2022 24.0    • Anion Gap 09/11/2022 17.4 (H)    • eGFR 09/11/2022 106.0    • HCG Qualitative 09/11/2022 Negative    • WBC 09/11/2022 6.65    • RBC 09/11/2022 3.98    • Hemoglobin 09/11/2022 12.1    • Hematocrit 09/11/2022 35.2    • MCV 09/11/2022 88.4    • MCH 09/11/2022 30.4    • MCHC 09/11/2022 34.4    • RDW 09/11/2022 13.7    • RDW-SD 09/11/2022 43.2    • MPV 09/11/2022 9.0    • Platelets 09/11/2022 180    • Neutrophil % 09/11/2022 68.8    • Lymphocyte % 09/11/2022 18.0 (L)    • Monocyte % 09/11/2022 6.3    • Eosinophil % 09/11/2022 6.0    • Basophil % 09/11/2022 0.6    • Immature Grans % 09/11/2022 0.3    • Neutrophils, Absolute 09/11/2022 4.57    • Lymphocytes, Absolute 09/11/2022 1.20    • Monocytes, Absolute 09/11/2022 0.42    • Eosinophils, Absolute 09/11/2022 0.40    • Basophils, Absolute 09/11/2022 0.04    • Immature Grans, Absolute 09/11/2022 0.02    • nRBC 09/11/2022 0.0    • RBC, UA 09/11/2022 21-30 (A)    • WBC, UA 09/11/2022 Too Numerous to Count (A)    • Bacteria, UA 09/11/2022 None Seen    • Squamous Epithelial Cell* 09/11/2022 3-6 (A)    • Yeast, UA 09/11/2022 Moderate/2+ Budding Yeast    • Hyaline Casts, UA 09/11/2022 3-6    • Methodology 09/11/2022 Manual Light Microscopy    • Urine Culture 09/11/2022 50,000 CFU/mL Mixed Margo Isolated     Admission on 08/19/2022, Discharged on 08/19/2022   Component Date Value   • Glucose 08/19/2022 224 (H)    • BUN 08/19/2022 16    • Creatinine 08/19/2022 0.78    • Sodium 08/19/2022 136    • Potassium 08/19/2022 4.3    • Chloride 08/19/2022 100    • CO2 08/19/2022 17.9 (L)    • Calcium 08/19/2022 9.8    • Total Protein 08/19/2022 7.5    • Albumin 08/19/2022 4.39    • ALT (SGPT) 08/19/2022 27    • AST (SGOT) 08/19/2022 43 (H)    • Alkaline Phosphatase 08/19/2022 123 (H)    • Total Bilirubin 08/19/2022 0.3    • Globulin 08/19/2022 3.1    • A/G Ratio 08/19/2022 1.4    • BUN/Creatinine Ratio 08/19/2022 20.5    • Anion Gap 08/19/2022 18.1 (H)    • eGFR 08/19/2022 101.1    • Lipase 08/19/2022 73 (H)    • Color, UA 08/19/2022 Yellow    • Appearance, UA 08/19/2022 Turbid (A)    • pH, UA 08/19/2022 5.5    • Specific Gravity, UA 08/19/2022 1.022    • Glucose, UA 08/19/2022 100 mg/dL (Trace) (A)    • Ketones, UA 08/19/2022 Negative    • Bilirubin, UA 08/19/2022 Negative    • Blood, UA 08/19/2022 Moderate (2+) (A)    • Protein, UA 08/19/2022 100 mg/dL (2+) (A)    • Leuk Esterase, UA 08/19/2022 Large (3+) (A)    • Nitrite, UA 08/19/2022 Negative    • Urobilinogen, UA 08/19/2022 0.2 E.U./dL    • C-Reactive Protein 08/19/2022 1.60 (H)    • Protime 08/19/2022 12.8    • INR 08/19/2022 0.94    • PTT 08/19/2022 28.6    • WBC 08/19/2022 6.98    • RBC 08/19/2022 4.01    • Hemoglobin 08/19/2022 12.1    • Hematocrit 08/19/2022 35.2    • MCV 08/19/2022 87.8    • MCH 08/19/2022 30.2    • MCHC 08/19/2022 34.4    • RDW 08/19/2022 12.7    • RDW-SD 08/19/2022 39.9    • MPV 08/19/2022 9.2    • Platelets 08/19/2022 224    • Neutrophil % 08/19/2022 56.0    • Lymphocyte % 08/19/2022 23.9    • Monocyte % 08/19/2022 6.4    • Eosinophil % 08/19/2022 12.2 (H)    • Basophil % 08/19/2022 0.9    • Immature Grans % 08/19/2022 0.6 (H)    • Neutrophils, Absolute 08/19/2022 3.91    • Lymphocytes, Absolute 08/19/2022 1.67    • Monocytes, Absolute 08/19/2022  0.45    • Eosinophils, Absolute 08/19/2022 0.85 (H)    • Basophils, Absolute 08/19/2022 0.06    • Immature Grans, Absolute 08/19/2022 0.04    • nRBC 08/19/2022 0.0    • Extra Tube 08/19/2022 Hold for add-ons.    • Extra Tube 08/19/2022 hold for add-on    • Extra Tube 08/19/2022 Hold for add-ons.    • Extra Tube 08/19/2022 Hold for add-ons.    • Acetone 08/19/2022 Negative    • Site 08/19/2022 Left Radial    • Apolinar's Test 08/19/2022 Positive    • pH, Arterial 08/19/2022 7.378    • pCO2, Arterial 08/19/2022 37.6    • pO2, Arterial 08/19/2022 130.0 (H)    • HCO3, Arterial 08/19/2022 22.1    • Base Excess, Arterial 08/19/2022 -2.7 (L)    • O2 Saturation, Arterial 08/19/2022 >99.2 (H)    • Hemoglobin, Blood Gas 08/19/2022 12.3 (L)    • Hematocrit, Blood Gas 08/19/2022 37.6 (L)    • Oxyhemoglobin 08/19/2022 98.4    • Methemoglobin 08/19/2022 0.00    • Carboxyhemoglobin 08/19/2022 1.3    • A-a DO2 08/19/2022 5.1    • CO2 Content 08/19/2022 23.2    • Temperature 08/19/2022 0.0    • Barometric Pressure for * 08/19/2022 728    • Modality 08/19/2022 Nasal Cannula    • FIO2 08/19/2022 26    • Flow Rate 08/19/2022 1.5    • Ventilator Mode 08/19/2022 NA    • Collected by 08/19/2022 [id number]    • RBC, UA 08/19/2022 13-20 (A)    • WBC, UA 08/19/2022 Too Numerous to Count (A)    • Bacteria, UA 08/19/2022 Trace (A)    • Squamous Epithelial Cell* 08/19/2022 3-6 (A)    • Yeast, UA 08/19/2022 Moderate/2+ Budding Yeast    • Hyaline Casts, UA 08/19/2022 None Seen    • Methodology 08/19/2022 Manual Light Microscopy    • Urine Culture 08/19/2022 50,000 CFU/mL Mixed Margo Isolated    Lab Requisition on 08/16/2022   Component Date Value   • Total Protein 08/16/2022 6.7    • Albumin 08/16/2022 4.12    • ALT (SGPT) 08/16/2022 22    • AST (SGOT) 08/16/2022 35 (H)    • Alkaline Phosphatase 08/16/2022 107    • Total Bilirubin 08/16/2022 0.3    • Bilirubin, Direct 08/16/2022 <0.2    • Bilirubin, Indirect 08/16/2022     • BUN 08/16/2022 17    •  Creatinine 08/16/2022 0.69    • eGFR 08/16/2022 115.5    There may be more visits with results that are not included.            Assessment & Plan   Natalia Arzate is a very pleasant 37 y.o.  female who presents in follow up appointment for further management and evaluation of heterozygous Factor V Leiden mutation.     Heterozygous Factor V Leiden mutation  Heterozygous [id number] MTHFR mutation  - At present she is on anticoagulation. She did have an IVC filter in place which has since been removed. Patient was previously evaluated by Dr. De Dios after she was found to have a provoked DVT and bilateral PE after hormone therapy. She underwent workup and was found to be heterozygous for the FVL mutation and heterozygous [id number] MTHFR mutation. Given the provoked nature it was recommended that she continue anticoagulation for one year in which she was taking Coumadin.   - Homocysteine level is normal. Antithrombin panel shows an elevated activity level with normal antigen level. This is likely due to Eliquis use and carries no clinical significance. Protein S activity level is normal, while Protein C is elevated and likely secondary to Eliquis use. Antiphospholipid studies ar negative for lupus anticoagulant and beta 2 glycoprotein and anticardiolipin antibodies are normal.   - Regardless of workup would recommend life long anticoagulation given her history of recurrent thrombosis. She also carries a history significant for atrial fibrillation and as per patient Neurology also recommended ongoing anticoagulation unless there is a contraindication or side effects. If she develops thrombosis while on Eliquis would than recommend Coumadin.  - I discussed the risks and benefits as well as the bleeding risk profile with each anticoagulation treatment (such as Warfarin, Xarelto, Eliquis) and the risk of bleeding that can occur while being on any anticoagulation. She understands that should spontaneous or abnormal bleeding  occur she must seek immediate medical attention. I also advised her against high impact sports/activities while on anticoagulation that would place him at increased risk of bleeding. No NSAID use while on treatment as this can increase the risk of bleeding.  - I have also advised that she obtain a medical band while on anticoagulation so that this would alert paramedics, medical personal, etc that she is on anticoagulation should she ever require medical attention.  - Protein C & S antigen levels are pending.     Recurrent UTIs and nephrolithiasis  - She follows with Dr. Motley as well as Dr. Lord at . She is s/p left nephrectomy October 2022.     ACO / CHERELLE/Other  Quality measures  -  Natalia Arzate  reports that she has never smoked. She has never used smokeless tobacco.  -  Natalia Arzate received 2022 flu vaccine.  -  Natalia Arzate reports a pain score of 9.  Given her pain assessment as noted, treatment options were discussed and the following options were decided upon as a follow-up plan to address the patient's pain: referral to Primary Care for assistance in pain treatment guidance.  -  Current outpatient and discharge medications have been reconciled for the patient.  Reviewed by: Alison Constantino MD        I will have the patient return in follow up appointment in six months for follow up. If she is doing well from a thrombosis stand point will thereafter have her follow with her PCP. She understands that should she have any questions or concerns prior to her appointment she should give us a call at any time and I would be happy to see her sooner. It was a pleasure to see this patient in clinic today, thank you for allowing me to participate in the care of this patient.      Alison Constantino MD  12/19/22   14:04 EST

## 2022-12-22 LAB
PROT C AG ACT/NOR PPP IA: 126 % (ref 60–150)
PROT S AG ACT/NOR PPP IA: 174 % (ref 60–150)
PROT S FREE AG ACT/NOR PPP IA: 141 % (ref 61–136)

## 2022-12-27 DIAGNOSIS — Z79.4 TYPE 2 DIABETES MELLITUS WITH HYPERGLYCEMIA, WITH LONG-TERM CURRENT USE OF INSULIN: Primary | Chronic | ICD-10-CM

## 2022-12-27 DIAGNOSIS — E11.65 TYPE 2 DIABETES MELLITUS WITH HYPERGLYCEMIA, WITH LONG-TERM CURRENT USE OF INSULIN: Primary | Chronic | ICD-10-CM

## 2022-12-28 ENCOUNTER — HOSPITAL ENCOUNTER (EMERGENCY)
Facility: HOSPITAL | Age: 37
Discharge: HOME | End: 2022-12-28
Attending: STUDENT IN AN ORGANIZED HEALTH CARE EDUCATION/TRAINING PROGRAM | Admitting: STUDENT IN AN ORGANIZED HEALTH CARE EDUCATION/TRAINING PROGRAM
Payer: COMMERCIAL

## 2022-12-28 ENCOUNTER — APPOINTMENT (OUTPATIENT)
Dept: CT IMAGING | Facility: HOSPITAL | Age: 37
End: 2022-12-28
Payer: COMMERCIAL

## 2022-12-28 VITALS
DIASTOLIC BLOOD PRESSURE: 85 MMHG | OXYGEN SATURATION: 99 % | WEIGHT: 288 LBS | BODY MASS INDEX: 49.17 KG/M2 | RESPIRATION RATE: 20 BRPM | HEIGHT: 64 IN | HEART RATE: 107 BPM | TEMPERATURE: 98.7 F | SYSTOLIC BLOOD PRESSURE: 136 MMHG

## 2022-12-28 DIAGNOSIS — N23 RENAL COLIC ON RIGHT SIDE: Primary | ICD-10-CM

## 2022-12-28 LAB
ALBUMIN SERPL-MCNC: 3.5 G/DL (ref 3.5–5.2)
ALBUMIN/GLOB SERPL: 0.7 G/DL
ALP SERPL-CCNC: 119 U/L (ref 39–117)
ALT SERPL W P-5'-P-CCNC: 17 U/L (ref 1–33)
ANION GAP SERPL CALCULATED.3IONS-SCNC: 15.2 MMOL/L (ref 5–15)
AST SERPL-CCNC: 29 U/L (ref 1–32)
BACTERIA UR QL AUTO: ABNORMAL /HPF
BASOPHILS # BLD AUTO: 0.04 10*3/MM3 (ref 0–0.2)
BASOPHILS NFR BLD AUTO: 0.4 % (ref 0–1.5)
BILIRUB SERPL-MCNC: 0.3 MG/DL (ref 0–1.2)
BILIRUB UR QL STRIP: NEGATIVE
BUN SERPL-MCNC: 19 MG/DL (ref 6–20)
BUN/CREAT SERPL: 24.4 (ref 7–25)
CALCIUM SPEC-SCNC: 9.6 MG/DL (ref 8.6–10.5)
CHLORIDE SERPL-SCNC: 95 MMOL/L (ref 98–107)
CLARITY UR: ABNORMAL
CO2 SERPL-SCNC: 21.8 MMOL/L (ref 22–29)
COLOR UR: YELLOW
CREAT SERPL-MCNC: 0.78 MG/DL (ref 0.57–1)
CRP SERPL-MCNC: 8.07 MG/DL (ref 0–0.5)
DEPRECATED RDW RBC AUTO: 49.5 FL (ref 37–54)
EGFRCR SERPLBLD CKD-EPI 2021: 101.1 ML/MIN/1.73
EOSINOPHIL # BLD AUTO: 0.18 10*3/MM3 (ref 0–0.4)
EOSINOPHIL NFR BLD AUTO: 2 % (ref 0.3–6.2)
ERYTHROCYTE [DISTWIDTH] IN BLOOD BY AUTOMATED COUNT: 16.4 % (ref 12.3–15.4)
GLOBULIN UR ELPH-MCNC: 4.7 GM/DL
GLUCOSE SERPL-MCNC: 261 MG/DL (ref 65–99)
GLUCOSE UR STRIP-MCNC: ABNORMAL MG/DL
HCT VFR BLD AUTO: 31.6 % (ref 34–46.6)
HGB BLD-MCNC: 10.1 G/DL (ref 12–15.9)
HGB UR QL STRIP.AUTO: ABNORMAL
HYALINE CASTS UR QL AUTO: ABNORMAL /LPF
IMM GRANULOCYTES # BLD AUTO: 0.04 10*3/MM3 (ref 0–0.05)
IMM GRANULOCYTES NFR BLD AUTO: 0.4 % (ref 0–0.5)
KETONES UR QL STRIP: NEGATIVE
LEUKOCYTE ESTERASE UR QL STRIP.AUTO: ABNORMAL
LYMPHOCYTES # BLD AUTO: 1.58 10*3/MM3 (ref 0.7–3.1)
LYMPHOCYTES NFR BLD AUTO: 17.3 % (ref 19.6–45.3)
MCH RBC QN AUTO: 26.7 PG (ref 26.6–33)
MCHC RBC AUTO-ENTMCNC: 32 G/DL (ref 31.5–35.7)
MCV RBC AUTO: 83.6 FL (ref 79–97)
MONOCYTES # BLD AUTO: 0.69 10*3/MM3 (ref 0.1–0.9)
MONOCYTES NFR BLD AUTO: 7.6 % (ref 5–12)
NEUTROPHILS NFR BLD AUTO: 6.59 10*3/MM3 (ref 1.7–7)
NEUTROPHILS NFR BLD AUTO: 72.3 % (ref 42.7–76)
NITRITE UR QL STRIP: NEGATIVE
NRBC BLD AUTO-RTO: 0 /100 WBC (ref 0–0.2)
PH UR STRIP.AUTO: 6 [PH] (ref 5–8)
PLATELET # BLD AUTO: 342 10*3/MM3 (ref 140–450)
PMV BLD AUTO: 8.6 FL (ref 6–12)
POTASSIUM SERPL-SCNC: 4.2 MMOL/L (ref 3.5–5.2)
PROT SERPL-MCNC: 8.2 G/DL (ref 6–8.5)
PROT UR QL STRIP: ABNORMAL
RBC # BLD AUTO: 3.78 10*6/MM3 (ref 3.77–5.28)
RBC # UR STRIP: ABNORMAL /HPF
REF LAB TEST METHOD: ABNORMAL
SODIUM SERPL-SCNC: 132 MMOL/L (ref 136–145)
SP GR UR STRIP: 1.02 (ref 1–1.03)
SQUAMOUS #/AREA URNS HPF: ABNORMAL /HPF
UROBILINOGEN UR QL STRIP: ABNORMAL
WBC # UR STRIP: ABNORMAL /HPF
WBC NRBC COR # BLD: 9.12 10*3/MM3 (ref 3.4–10.8)

## 2022-12-28 PROCEDURE — 80053 COMPREHEN METABOLIC PANEL: CPT | Performed by: STUDENT IN AN ORGANIZED HEALTH CARE EDUCATION/TRAINING PROGRAM

## 2022-12-28 PROCEDURE — 63710000001 ONDANSETRON ODT 4 MG TABLET DISPERSIBLE: Performed by: STUDENT IN AN ORGANIZED HEALTH CARE EDUCATION/TRAINING PROGRAM

## 2022-12-28 PROCEDURE — 99284 EMERGENCY DEPT VISIT MOD MDM: CPT

## 2022-12-28 PROCEDURE — 81001 URINALYSIS AUTO W/SCOPE: CPT | Performed by: STUDENT IN AN ORGANIZED HEALTH CARE EDUCATION/TRAINING PROGRAM

## 2022-12-28 PROCEDURE — 96372 THER/PROPH/DIAG INJ SC/IM: CPT

## 2022-12-28 PROCEDURE — 74176 CT ABD & PELVIS W/O CONTRAST: CPT

## 2022-12-28 PROCEDURE — 25010000002 MORPHINE PER 10 MG: Performed by: STUDENT IN AN ORGANIZED HEALTH CARE EDUCATION/TRAINING PROGRAM

## 2022-12-28 PROCEDURE — 86140 C-REACTIVE PROTEIN: CPT | Performed by: STUDENT IN AN ORGANIZED HEALTH CARE EDUCATION/TRAINING PROGRAM

## 2022-12-28 PROCEDURE — 87086 URINE CULTURE/COLONY COUNT: CPT | Performed by: STUDENT IN AN ORGANIZED HEALTH CARE EDUCATION/TRAINING PROGRAM

## 2022-12-28 PROCEDURE — 85025 COMPLETE CBC W/AUTO DIFF WBC: CPT | Performed by: STUDENT IN AN ORGANIZED HEALTH CARE EDUCATION/TRAINING PROGRAM

## 2022-12-28 RX ORDER — OXYCODONE HYDROCHLORIDE AND ACETAMINOPHEN 5; 325 MG/1; MG/1
1 TABLET ORAL ONCE
Status: COMPLETED | OUTPATIENT
Start: 2022-12-28 | End: 2022-12-28

## 2022-12-28 RX ORDER — ONDANSETRON 4 MG/1
4 TABLET, ORALLY DISINTEGRATING ORAL EVERY 6 HOURS PRN
Qty: 12 TABLET | Refills: 0 | OUTPATIENT
Start: 2022-12-28 | End: 2023-04-07

## 2022-12-28 RX ORDER — ONDANSETRON 4 MG/1
4 TABLET, ORALLY DISINTEGRATING ORAL ONCE
Status: COMPLETED | OUTPATIENT
Start: 2022-12-28 | End: 2022-12-28

## 2022-12-28 RX ADMIN — OXYCODONE HYDROCHLORIDE AND ACETAMINOPHEN 1 TABLET: 5; 325 TABLET ORAL at 05:41

## 2022-12-28 RX ADMIN — ONDANSETRON 4 MG: 4 TABLET, ORALLY DISINTEGRATING ORAL at 04:26

## 2022-12-28 RX ADMIN — MORPHINE SULFATE 4 MG: 4 INJECTION, SOLUTION INTRAMUSCULAR; INTRAVENOUS at 04:26

## 2022-12-28 NOTE — ED NOTES
Pt arrives from home via EMS with c/o right flank pain that started yesterday and has progressively gotten worse. Pt states she had a 15 mm stone in her left kidney back in October and had to have her kidney removed. Pt states she is experiencing pain 10/10 with nausea and vomiting.

## 2022-12-28 NOTE — ED PROVIDER NOTES
Subjective     History provided by:  Patient  Flank Pain  Pain location:  R flank  Pain quality: gnawing, sharp and stabbing    Pain severity:  Moderate  Onset quality:  Sudden  Duration:  2 days  Timing:  Constant  Progression:  Worsening  Chronicity:  New  Relieved by:  Nothing  Associated symptoms: nausea and vomiting    Associated symptoms: no chest pain, no dysuria and no fever    Risk factors: multiple surgeries, obesity and recent hospitalization        Review of Systems   Constitutional: Negative.  Negative for fever.   HENT: Negative.    Respiratory: Negative.    Cardiovascular: Negative.  Negative for chest pain.   Gastrointestinal: Positive for nausea and vomiting. Negative for abdominal pain.   Endocrine: Negative.    Genitourinary: Positive for flank pain. Negative for dysuria.   Skin: Negative.    Neurological: Negative.    Psychiatric/Behavioral: Negative.    All other systems reviewed and are negative.      Past Medical History:   Diagnosis Date   • A-fib (HCC)    • Abnormal ECG    • Anemia    • Anxiety    • Asthma    • Cancer (HCC)     Ovarian   • Depression    • Diabetes mellitus (HCC)    • DVT (deep venous thrombosis) (HCC)    • Factor 5 Leiden mutation, heterozygous (HCC)    • Fibroid    • GERD (gastroesophageal reflux disease)    • Gout    • H/O abdominal abscess    • History of sepsis    • History of transfusion    • Hyperlipidemia    • Hypothyroid    • Kidney stone    • Migraines    • Neuropathy    • Ovarian cancer (HCC) 02/2021   • Ovarian cyst    • PE (pulmonary embolism)    • Polycystic ovary syndrome    • Preeclampsia    • Rh incompatibility    • Stroke (HCC)    • TIA (transient ischemic attack)    • Urinary tract infection    • Varicella        Allergies   Allergen Reactions   • Toradol [Ketorolac Tromethamine] Anaphylaxis and Hives   • Haldol [Haloperidol] Hives and Mental Status Change   • Tramadol Hives and Swelling   • Amoxicillin Hives and Rash   • Penicillins Hives and Rash        Past Surgical History:   Procedure Laterality Date   • ABDOMINAL SURGERY     • CARDIAC CATHETERIZATION     •  SECTION     • CHOLECYSTECTOMY     • COLONOSCOPY     • ENDOSCOPY     • EXTRACORPOREAL SHOCK WAVE LITHOTRIPSY (ESWL) Left 10/22/2021    Procedure: EXTRACORPOREAL SHOCKWAVE LITHOTRIPSY;  Surgeon: Milan Motley MD;  Location: [facility];  Service: Urology;  Laterality: Left;   • LITHOTRIPSY     • RIGHT OOPHORECTOMY     • URETEROSCOPY LASER LITHOTRIPSY WITH STENT INSERTION Left 10/01/2021    Procedure: URETEROSCOPY WITH STENT PLACEMENT;  Surgeon: Milan Motley MD;  Location: [facility];  Service: Urology;  Laterality: Left;   • URETEROSCOPY LASER LITHOTRIPSY WITH STENT INSERTION Left 2022    Procedure: URETEROSCOPY STENT REMOVAL;  Surgeon: Milan Motley MD;  Location: [facility];  Service: Urology;  Laterality: Left;   • URETEROSCOPY LASER LITHOTRIPSY WITH STENT INSERTION Left 2022    Procedure: CYSTOSCOPY RETROGRADE LEFT WITH STENT PLACEMENT;  Surgeon: Milan Motley MD;  Location: [facility];  Service: Urology;  Laterality: Left;       Family History   Problem Relation Age of Onset   • Hypertension Mother    • Diabetes Father    • Hypertension Father    • Heart attack Father    • No Known Problems Sister    • No Known Problems Brother    • No Known Problems Son    • No Known Problems Daughter    • No Known Problems Maternal Grandmother    • No Known Problems Maternal Grandfather    • No Known Problems Paternal Grandmother    • No Known Problems Paternal Grandfather    • No Known Problems Cousin    • Rheum arthritis Neg Hx    • Osteoarthritis Neg Hx    • Asthma Neg Hx    • Heart failure Neg Hx    • Hyperlipidemia Neg Hx    • Migraines Neg Hx    • Rashes / Skin problems Neg Hx    • Seizures Neg Hx    • Stroke Neg Hx    • Thyroid disease Neg Hx        Social History     Socioeconomic History   • Marital status:    Tobacco Use   • Smoking status:  Never   • Smokeless tobacco: Never   Vaping Use   • Vaping Use: Never used   Substance and Sexual Activity   • Alcohol use: No   • Drug use: Never     Comment: Pt has track marks on right arm. Pt states \"It's from my INR being drawn.\"    • Sexual activity: Not Currently     Partners: Male     Birth control/protection: None           Objective   Physical Exam  Vitals and nursing note reviewed.   Constitutional:       General: She is not in acute distress.     Appearance: She is well-developed. She is not diaphoretic.   HENT:      Head: Normocephalic and atraumatic.      Right Ear: External ear normal.      Left Ear: External ear normal.      Nose: Nose normal.   Eyes:      Conjunctiva/sclera: Conjunctivae normal.   Neck:      Vascular: No JVD.      Trachea: No tracheal deviation.   Cardiovascular:      Rate and Rhythm: Normal rate.      Heart sounds: No murmur heard.  Pulmonary:      Effort: Pulmonary effort is normal. No respiratory distress.      Breath sounds: No wheezing.   Abdominal:      Palpations: Abdomen is soft.      Tenderness: There is no abdominal tenderness. There is right CVA tenderness.   Musculoskeletal:         General: No deformity. Normal range of motion.      Cervical back: Normal range of motion and neck supple.   Skin:     General: Skin is warm and dry.      Coloration: Skin is not pale.      Findings: No erythema or rash.   Neurological:      Mental Status: She is alert and oriented to person, place, and time.      Cranial Nerves: No cranial nerve deficit.   Psychiatric:         Behavior: Behavior normal.         Thought Content: Thought content normal.         Procedures           ED Course  ED Course as of 12/28/22 0536   Wed Dec 28, 2022   0530 CT abd pelvis rad interpreted: 1. Interval enlargement of a thick-walled fluid collection in the left flank as described, possibly an enlarging chronic hematoma or postoperative seroma. Abscess is not excluded. It does not contain any gas bubbles.  This extends caudally from the  nephrectomy bed on the left side.  2. Nonobstructing right kidney stones. No ureteral stones or hydronephrosis.  3. Hepatosplenomegaly.  4. Grossly normal unopacified GI tract. Normal appendix.  5. Cholecystectomy without biliary obstruction. [RB]      ED Course User Index  [RB] Thierry Fisher II, PA                                           MDM  Number of Diagnoses or Management Options  Renal colic on right side: established and worsening     Amount and/or Complexity of Data Reviewed  Clinical lab tests: ordered and reviewed  Tests in the radiology section of CPT®: ordered and reviewed  Review and summarize past medical records: yes    Risk of Complications, Morbidity, and/or Mortality  Presenting problems: moderate  Diagnostic procedures: moderate  Management options: low    Patient Progress  Patient progress: stable      Final diagnoses:   Renal colic on right side       ED Disposition  ED Disposition     ED Disposition   Discharge    Condition   Stable    Comment   --             Eddie Latif, APRN  [address] [facility] [address]  [phone]    Schedule an appointment as soon as possible for a visit            Where to Get Your Medications      These medications were sent to [address] - [phone] [facility] [phone] [address] [address]    Phone: [phone]   · ondansetron ODT 4 MG disintegrating tablet        Medication List      No changes were made to your prescriptions during this visit.          Thierry Fisher II, PA  12/28/22 0536

## 2022-12-28 NOTE — ED NOTES
I called house supervisor Iris and request for the [facility] to transport patient back to residence.

## 2022-12-29 LAB — BACTERIA SPEC AEROBE CULT: NORMAL

## 2022-12-30 RX ORDER — INSULIN LISPRO 100 [IU]/ML
INJECTION, SOLUTION INTRAVENOUS; SUBCUTANEOUS
Qty: 15 ML | Refills: 0 | Status: SHIPPED | OUTPATIENT
Start: 2022-12-30 | End: 2023-03-28 | Stop reason: SDUPTHER

## 2023-01-01 ENCOUNTER — APPOINTMENT (OUTPATIENT)
Dept: CT IMAGING | Facility: HOSPITAL | Age: 38
End: 2023-01-01
Payer: COMMERCIAL

## 2023-01-01 ENCOUNTER — HOSPITAL ENCOUNTER (EMERGENCY)
Facility: HOSPITAL | Age: 38
Discharge: HOME | End: 2023-01-01
Attending: EMERGENCY MEDICINE | Admitting: EMERGENCY MEDICINE
Payer: COMMERCIAL

## 2023-01-01 VITALS
HEART RATE: 87 BPM | WEIGHT: 288 LBS | HEIGHT: 64 IN | SYSTOLIC BLOOD PRESSURE: 134 MMHG | BODY MASS INDEX: 49.17 KG/M2 | RESPIRATION RATE: 17 BRPM | DIASTOLIC BLOOD PRESSURE: 82 MMHG | TEMPERATURE: 97.6 F | OXYGEN SATURATION: 99 %

## 2023-01-01 DIAGNOSIS — N20.0 RENAL CALCULI: ICD-10-CM

## 2023-01-01 DIAGNOSIS — R73.9 HYPERGLYCEMIA: ICD-10-CM

## 2023-01-01 DIAGNOSIS — N39.0 URINARY TRACT INFECTION IN FEMALE: Primary | ICD-10-CM

## 2023-01-01 LAB
ALBUMIN SERPL-MCNC: 4.1 G/DL (ref 3.5–5.2)
ALBUMIN/GLOB SERPL: 0.9 G/DL
ALP SERPL-CCNC: 135 U/L (ref 39–117)
ALT SERPL W P-5'-P-CCNC: 16 U/L (ref 1–33)
ANION GAP SERPL CALCULATED.3IONS-SCNC: 15.8 MMOL/L (ref 5–15)
AST SERPL-CCNC: 26 U/L (ref 1–32)
BACTERIA UR QL AUTO: ABNORMAL /HPF
BASOPHILS # BLD AUTO: 0.05 10*3/MM3 (ref 0–0.2)
BASOPHILS NFR BLD AUTO: 0.4 % (ref 0–1.5)
BILIRUB SERPL-MCNC: 0.3 MG/DL (ref 0–1.2)
BILIRUB UR QL STRIP: ABNORMAL
BUN SERPL-MCNC: 20 MG/DL (ref 6–20)
BUN/CREAT SERPL: 24.7 (ref 7–25)
CALCIUM SPEC-SCNC: 9.9 MG/DL (ref 8.6–10.5)
CHLORIDE SERPL-SCNC: 95 MMOL/L (ref 98–107)
CLARITY UR: ABNORMAL
CO2 SERPL-SCNC: 21.2 MMOL/L (ref 22–29)
COLOR UR: ABNORMAL
CREAT SERPL-MCNC: 0.81 MG/DL (ref 0.57–1)
DEPRECATED RDW RBC AUTO: 48.9 FL (ref 37–54)
EGFRCR SERPLBLD CKD-EPI 2021: 96.6 ML/MIN/1.73
EOSINOPHIL # BLD AUTO: 0.17 10*3/MM3 (ref 0–0.4)
EOSINOPHIL NFR BLD AUTO: 1.3 % (ref 0.3–6.2)
ERYTHROCYTE [DISTWIDTH] IN BLOOD BY AUTOMATED COUNT: 16.2 % (ref 12.3–15.4)
GLOBULIN UR ELPH-MCNC: 4.5 GM/DL
GLUCOSE SERPL-MCNC: 322 MG/DL (ref 65–99)
GLUCOSE UR STRIP-MCNC: ABNORMAL MG/DL
HCT VFR BLD AUTO: 34 % (ref 34–46.6)
HGB BLD-MCNC: 10.6 G/DL (ref 12–15.9)
HGB UR QL STRIP.AUTO: ABNORMAL
HOLD SPECIMEN: NORMAL
HOLD SPECIMEN: NORMAL
HYALINE CASTS UR QL AUTO: ABNORMAL /LPF
IMM GRANULOCYTES # BLD AUTO: 0.09 10*3/MM3 (ref 0–0.05)
IMM GRANULOCYTES NFR BLD AUTO: 0.7 % (ref 0–0.5)
KETONES UR QL STRIP: ABNORMAL
LEUKOCYTE ESTERASE UR QL STRIP.AUTO: ABNORMAL
LIPASE SERPL-CCNC: 34 U/L (ref 13–60)
LYMPHOCYTES # BLD AUTO: 1.65 10*3/MM3 (ref 0.7–3.1)
LYMPHOCYTES NFR BLD AUTO: 12.9 % (ref 19.6–45.3)
MCH RBC QN AUTO: 25.8 PG (ref 26.6–33)
MCHC RBC AUTO-ENTMCNC: 31.2 G/DL (ref 31.5–35.7)
MCV RBC AUTO: 82.7 FL (ref 79–97)
MONOCYTES # BLD AUTO: 0.75 10*3/MM3 (ref 0.1–0.9)
MONOCYTES NFR BLD AUTO: 5.9 % (ref 5–12)
NEUTROPHILS NFR BLD AUTO: 10.09 10*3/MM3 (ref 1.7–7)
NEUTROPHILS NFR BLD AUTO: 78.8 % (ref 42.7–76)
NITRITE UR QL STRIP: NEGATIVE
NRBC BLD AUTO-RTO: 0 /100 WBC (ref 0–0.2)
PH UR STRIP.AUTO: <=5 [PH] (ref 5–8)
PLATELET # BLD AUTO: 361 10*3/MM3 (ref 140–450)
PMV BLD AUTO: 9 FL (ref 6–12)
POTASSIUM SERPL-SCNC: 4.2 MMOL/L (ref 3.5–5.2)
PROT SERPL-MCNC: 8.6 G/DL (ref 6–8.5)
PROT UR QL STRIP: ABNORMAL
RBC # BLD AUTO: 4.11 10*6/MM3 (ref 3.77–5.28)
RBC # UR STRIP: ABNORMAL /HPF
REF LAB TEST METHOD: ABNORMAL
SODIUM SERPL-SCNC: 132 MMOL/L (ref 136–145)
SP GR UR STRIP: 1.03 (ref 1–1.03)
SQUAMOUS #/AREA URNS HPF: ABNORMAL /HPF
UROBILINOGEN UR QL STRIP: ABNORMAL
WBC # UR STRIP: ABNORMAL /HPF
WBC NRBC COR # BLD: 12.8 10*3/MM3 (ref 3.4–10.8)
WHOLE BLOOD HOLD COAG: NORMAL
WHOLE BLOOD HOLD SPECIMEN: NORMAL
YEAST URNS QL MICRO: ABNORMAL /HPF

## 2023-01-01 PROCEDURE — 80053 COMPREHEN METABOLIC PANEL: CPT | Performed by: NURSE PRACTITIONER

## 2023-01-01 PROCEDURE — 99284 EMERGENCY DEPT VISIT MOD MDM: CPT

## 2023-01-01 PROCEDURE — 85025 COMPLETE CBC W/AUTO DIFF WBC: CPT | Performed by: NURSE PRACTITIONER

## 2023-01-01 PROCEDURE — 96360 HYDRATION IV INFUSION INIT: CPT

## 2023-01-01 PROCEDURE — 74176 CT ABD & PELVIS W/O CONTRAST: CPT

## 2023-01-01 PROCEDURE — 83690 ASSAY OF LIPASE: CPT | Performed by: NURSE PRACTITIONER

## 2023-01-01 PROCEDURE — 87086 URINE CULTURE/COLONY COUNT: CPT | Performed by: NURSE PRACTITIONER

## 2023-01-01 PROCEDURE — 81001 URINALYSIS AUTO W/SCOPE: CPT | Performed by: NURSE PRACTITIONER

## 2023-01-01 RX ORDER — OXYCODONE HYDROCHLORIDE AND ACETAMINOPHEN 5; 325 MG/1; MG/1
1 TABLET ORAL ONCE
Status: COMPLETED | OUTPATIENT
Start: 2023-01-01 | End: 2023-01-01

## 2023-01-01 RX ORDER — SODIUM CHLORIDE 0.9 % (FLUSH) 0.9 %
10 SYRINGE (ML) INJECTION AS NEEDED
Status: DISCONTINUED | OUTPATIENT
Start: 2023-01-01 | End: 2023-01-01 | Stop reason: HOSPADM

## 2023-01-01 RX ORDER — NITROFURANTOIN 25; 75 MG/1; MG/1
100 CAPSULE ORAL 2 TIMES DAILY
Qty: 14 CAPSULE | Refills: 0 | Status: SHIPPED | OUTPATIENT
Start: 2023-01-01 | End: 2023-01-08

## 2023-01-01 RX ORDER — DEXAMETHASONE SODIUM PHOSPHATE 10 MG/ML
10 INJECTION, SOLUTION INTRAMUSCULAR; INTRAVENOUS ONCE
Status: DISCONTINUED | OUTPATIENT
Start: 2023-01-01 | End: 2023-01-01

## 2023-01-01 RX ORDER — TAMSULOSIN HYDROCHLORIDE 0.4 MG/1
0.4 CAPSULE ORAL ONCE
Status: COMPLETED | OUTPATIENT
Start: 2023-01-01 | End: 2023-01-01

## 2023-01-01 RX ADMIN — OXYCODONE HYDROCHLORIDE AND ACETAMINOPHEN 1 TABLET: 5; 325 TABLET ORAL at 06:14

## 2023-01-01 RX ADMIN — OXYCODONE HYDROCHLORIDE AND ACETAMINOPHEN 1 TABLET: 5; 325 TABLET ORAL at 08:11

## 2023-01-01 RX ADMIN — TAMSULOSIN HYDROCHLORIDE 0.4 MG: 0.4 CAPSULE ORAL at 06:14

## 2023-01-01 RX ADMIN — SODIUM CHLORIDE 500 ML: 9 INJECTION, SOLUTION INTRAVENOUS at 06:46

## 2023-01-01 NOTE — ED NOTES
Pt has had pain medication but is not driving herself. She asked to be discharged to the lobby so she can call her mother or a cab to come pick her up. SANGEETA. Pt ALOx4 at time of DC.

## 2023-01-02 LAB — BACTERIA SPEC AEROBE CULT: NORMAL

## 2023-01-02 NOTE — ED PROVIDER NOTES
Subjective   History of Present Illness  Patient presents to ER with flank and abdominal pain    Flank Pain  Associated symptoms: fatigue and nausea        Review of Systems   Constitutional: Positive for activity change and fatigue.   HENT: Negative.    Eyes: Negative.    Respiratory: Negative.    Cardiovascular: Negative.    Gastrointestinal: Positive for abdominal pain and nausea.   Endocrine: Negative.    Genitourinary: Positive for flank pain.   Skin: Negative.    Allergic/Immunologic: Negative.    Neurological: Negative.    Hematological: Negative.    Psychiatric/Behavioral: Negative.        Past Medical History:   Diagnosis Date   • A-fib (HCC)    • Abnormal ECG    • Anemia    • Anxiety    • Asthma    • Cancer (HCC)     Ovarian   • Depression    • Diabetes mellitus (HCC)    • DVT (deep venous thrombosis) (HCC)    • Factor 5 Leiden mutation, heterozygous (HCC)    • Fibroid    • GERD (gastroesophageal reflux disease)    • Gout    • H/O abdominal abscess    • History of sepsis    • History of transfusion    • Hyperlipidemia    • Hypothyroid    • Kidney stone    • Migraines    • Neuropathy    • Ovarian cancer (HCC) 2021   • Ovarian cyst    • PE (pulmonary embolism)    • Polycystic ovary syndrome    • Preeclampsia    • Rh incompatibility    • Stroke (HCC)    • TIA (transient ischemic attack)    • Urinary tract infection    • Varicella        Allergies   Allergen Reactions   • Toradol [Ketorolac Tromethamine] Anaphylaxis and Hives   • Haldol [Haloperidol] Hives and Mental Status Change   • Tramadol Hives and Swelling   • Amoxicillin Hives and Rash   • Penicillins Hives and Rash       Past Surgical History:   Procedure Laterality Date   • ABDOMINAL SURGERY     • CARDIAC CATHETERIZATION     •  SECTION     • CHOLECYSTECTOMY     • COLONOSCOPY     • ENDOSCOPY     • EXTRACORPOREAL SHOCK WAVE LITHOTRIPSY (ESWL) Left 10/22/2021    Procedure: EXTRACORPOREAL SHOCKWAVE LITHOTRIPSY;  Surgeon: Milan Motley,  MD;  Location: [facility];  Service: Urology;  Laterality: Left;   • LITHOTRIPSY     • RIGHT OOPHORECTOMY     • URETEROSCOPY LASER LITHOTRIPSY WITH STENT INSERTION Left 10/01/2021    Procedure: URETEROSCOPY WITH STENT PLACEMENT;  Surgeon: Milan Motley MD;  Location: [facility];  Service: Urology;  Laterality: Left;   • URETEROSCOPY LASER LITHOTRIPSY WITH STENT INSERTION Left 4/27/2022    Procedure: URETEROSCOPY STENT REMOVAL;  Surgeon: Milan Motley MD;  Location: [facility];  Service: Urology;  Laterality: Left;   • URETEROSCOPY LASER LITHOTRIPSY WITH STENT INSERTION Left 6/29/2022    Procedure: CYSTOSCOPY RETROGRADE LEFT WITH STENT PLACEMENT;  Surgeon: Milan Motley MD;  Location: [facility];  Service: Urology;  Laterality: Left;       Family History   Problem Relation Age of Onset   • Hypertension Mother    • Diabetes Father    • Hypertension Father    • Heart attack Father    • No Known Problems Sister    • No Known Problems Brother    • No Known Problems Son    • No Known Problems Daughter    • No Known Problems Maternal Grandmother    • No Known Problems Maternal Grandfather    • No Known Problems Paternal Grandmother    • No Known Problems Paternal Grandfather    • No Known Problems Cousin    • Rheum arthritis Neg Hx    • Osteoarthritis Neg Hx    • Asthma Neg Hx    • Heart failure Neg Hx    • Hyperlipidemia Neg Hx    • Migraines Neg Hx    • Rashes / Skin problems Neg Hx    • Seizures Neg Hx    • Stroke Neg Hx    • Thyroid disease Neg Hx        Social History     Socioeconomic History   • Marital status:    Tobacco Use   • Smoking status: Never   • Smokeless tobacco: Never   Vaping Use   • Vaping Use: Never used   Substance and Sexual Activity   • Alcohol use: No   • Drug use: Never     Comment: Pt has track marks on right arm. Pt states \"It's from my INR being drawn.\"    • Sexual activity: Not Currently     Partners: Male     Birth control/protection: None            Objective   Physical Exam  Vitals and nursing note reviewed.   HENT:      Head: Normocephalic.      Mouth/Throat:      Mouth: Mucous membranes are moist.   Eyes:      Extraocular Movements: Extraocular movements intact.   Cardiovascular:      Rate and Rhythm: Normal rate.   Abdominal:      General: Abdomen is flat. Bowel sounds are normal.      Palpations: Abdomen is soft.      Tenderness: There is abdominal tenderness in the suprapubic area.   Skin:     General: Skin is warm.      Capillary Refill: Capillary refill takes less than 2 seconds.   Neurological:      General: No focal deficit present.      Mental Status: She is alert.         Procedures           ED Course  ED Course as of 01/04/23 1410   Sun Jan 01, 2023   0650 WBC(!): 12.80 [SM]   0749 Glucose(!): 322 [SM]   0749 Leukocytes, UA(!): Small (1+) [SM]   0750 CT Abdomen Pelvis Stone Protocol [SM]      ED Course User Index  [SM] Rajani Zepeda, APRN                                           MDM    Final diagnoses:   Urinary tract infection in female   Renal calculi   Hyperglycemia       ED Disposition  ED Disposition     ED Disposition   Discharge    Condition   Stable    Comment   --             Eddie Latif, APRN  [address] [id number]  [phone]    Schedule an appointment as soon as possible for a visit in 2 days      Jacy Rooney APRN  [address] [facility] [address]  [place] [address]  [phone]    Schedule an appointment as soon as possible for a visit in 1 week  for DM management         Medication List      New Prescriptions    nitrofurantoin (macrocrystal-monohydrate) 100 MG capsule  Commonly known as: MACROBID  Take 1 capsule by mouth 2 (Two) Times a Day for 7 days.           Where to Get Your Medications      These medications were sent to [facility] [place], [place] - [address] [place] [address] - [phone]  - [phone]   [address] [facility] [address]    Phone: [phone]   · nitrofurantoin  (macrocrystal-monohydrate) 100 MG capsule          Francisco J Kate MD  01/04/23 140       Francisco J Kate MD  01/04/23 1412

## 2023-01-04 DIAGNOSIS — E11.65 TYPE 2 DIABETES MELLITUS WITH HYPERGLYCEMIA, WITH LONG-TERM CURRENT USE OF INSULIN: Chronic | ICD-10-CM

## 2023-01-04 DIAGNOSIS — Z79.4 TYPE 2 DIABETES MELLITUS WITH DIABETIC NEUROPATHY, WITH LONG-TERM CURRENT USE OF INSULIN: Primary | ICD-10-CM

## 2023-01-04 DIAGNOSIS — E11.40 TYPE 2 DIABETES MELLITUS WITH DIABETIC NEUROPATHY, WITH LONG-TERM CURRENT USE OF INSULIN: Primary | ICD-10-CM

## 2023-01-04 DIAGNOSIS — Z79.4 TYPE 2 DIABETES MELLITUS WITH HYPERGLYCEMIA, WITH LONG-TERM CURRENT USE OF INSULIN: Chronic | ICD-10-CM

## 2023-01-04 RX ORDER — INSULIN GLARGINE 100 [IU]/ML
INJECTION, SOLUTION SUBCUTANEOUS
Qty: 24 ML | Refills: 1 | Status: SHIPPED | OUTPATIENT
Start: 2023-01-04 | End: 2023-03-28 | Stop reason: SDUPTHER

## 2023-01-04 RX ORDER — GABAPENTIN 300 MG/1
300 CAPSULE ORAL NIGHTLY PRN
Qty: 30 CAPSULE | Refills: 0 | Status: SHIPPED | OUTPATIENT
Start: 2023-01-04 | End: 2023-02-07 | Stop reason: SDUPTHER

## 2023-01-08 ENCOUNTER — HOSPITAL ENCOUNTER (EMERGENCY)
Facility: HOSPITAL | Age: 38
Discharge: LEFT BEFORE DISCHARGE | End: 2023-01-08
Attending: EMERGENCY MEDICINE | Admitting: EMERGENCY MEDICINE
Payer: COMMERCIAL

## 2023-01-08 ENCOUNTER — APPOINTMENT (OUTPATIENT)
Dept: GENERAL RADIOLOGY | Facility: HOSPITAL | Age: 38
End: 2023-01-08
Payer: COMMERCIAL

## 2023-01-08 VITALS
TEMPERATURE: 98 F | RESPIRATION RATE: 16 BRPM | HEART RATE: 117 BPM | SYSTOLIC BLOOD PRESSURE: 146 MMHG | OXYGEN SATURATION: 94 % | DIASTOLIC BLOOD PRESSURE: 99 MMHG | WEIGHT: 288 LBS | HEIGHT: 64 IN | BODY MASS INDEX: 49.17 KG/M2

## 2023-01-08 DIAGNOSIS — R10.9 FLANK PAIN: Primary | ICD-10-CM

## 2023-01-08 LAB
ALBUMIN SERPL-MCNC: 3.3 G/DL (ref 3.5–5.2)
ALBUMIN/GLOB SERPL: 0.7 G/DL
ALP SERPL-CCNC: 122 U/L (ref 39–117)
ALT SERPL W P-5'-P-CCNC: 9 U/L (ref 1–33)
ANION GAP SERPL CALCULATED.3IONS-SCNC: 14.8 MMOL/L (ref 5–15)
AST SERPL-CCNC: 15 U/L (ref 1–32)
BASOPHILS # BLD AUTO: 0.05 10*3/MM3 (ref 0–0.2)
BASOPHILS NFR BLD AUTO: 0.4 % (ref 0–1.5)
BILIRUB SERPL-MCNC: 0.3 MG/DL (ref 0–1.2)
BUN SERPL-MCNC: 15 MG/DL (ref 6–20)
BUN/CREAT SERPL: 21.7 (ref 7–25)
CALCIUM SPEC-SCNC: 9.5 MG/DL (ref 8.6–10.5)
CHLORIDE SERPL-SCNC: 95 MMOL/L (ref 98–107)
CO2 SERPL-SCNC: 18.2 MMOL/L (ref 22–29)
CREAT SERPL-MCNC: 0.69 MG/DL (ref 0.57–1)
CRP SERPL-MCNC: 12.75 MG/DL (ref 0–0.5)
DEPRECATED RDW RBC AUTO: 48.8 FL (ref 37–54)
EGFRCR SERPLBLD CKD-EPI 2021: 115.5 ML/MIN/1.73
EOSINOPHIL # BLD AUTO: 0.07 10*3/MM3 (ref 0–0.4)
EOSINOPHIL NFR BLD AUTO: 0.6 % (ref 0.3–6.2)
ERYTHROCYTE [DISTWIDTH] IN BLOOD BY AUTOMATED COUNT: 16 % (ref 12.3–15.4)
ERYTHROCYTE [SEDIMENTATION RATE] IN BLOOD: 124 MM/HR (ref 0–20)
GLOBULIN UR ELPH-MCNC: 4.6 GM/DL
GLUCOSE SERPL-MCNC: 366 MG/DL (ref 65–99)
HCT VFR BLD AUTO: 32.5 % (ref 34–46.6)
HGB BLD-MCNC: 10.2 G/DL (ref 12–15.9)
HOLD SPECIMEN: NORMAL
HOLD SPECIMEN: NORMAL
IMM GRANULOCYTES # BLD AUTO: 0.06 10*3/MM3 (ref 0–0.05)
IMM GRANULOCYTES NFR BLD AUTO: 0.5 % (ref 0–0.5)
LYMPHOCYTES # BLD AUTO: 1.18 10*3/MM3 (ref 0.7–3.1)
LYMPHOCYTES NFR BLD AUTO: 10.2 % (ref 19.6–45.3)
MCH RBC QN AUTO: 26.4 PG (ref 26.6–33)
MCHC RBC AUTO-ENTMCNC: 31.4 G/DL (ref 31.5–35.7)
MCV RBC AUTO: 84 FL (ref 79–97)
MONOCYTES # BLD AUTO: 0.84 10*3/MM3 (ref 0.1–0.9)
MONOCYTES NFR BLD AUTO: 7.3 % (ref 5–12)
NEUTROPHILS NFR BLD AUTO: 81 % (ref 42.7–76)
NEUTROPHILS NFR BLD AUTO: 9.38 10*3/MM3 (ref 1.7–7)
NRBC BLD AUTO-RTO: 0 /100 WBC (ref 0–0.2)
PLATELET # BLD AUTO: 340 10*3/MM3 (ref 140–450)
PMV BLD AUTO: 8.9 FL (ref 6–12)
POTASSIUM SERPL-SCNC: 4.2 MMOL/L (ref 3.5–5.2)
PROT SERPL-MCNC: 7.9 G/DL (ref 6–8.5)
RBC # BLD AUTO: 3.87 10*6/MM3 (ref 3.77–5.28)
SODIUM SERPL-SCNC: 128 MMOL/L (ref 136–145)
WBC NRBC COR # BLD: 11.58 10*3/MM3 (ref 3.4–10.8)
WHOLE BLOOD HOLD COAG: NORMAL
WHOLE BLOOD HOLD SPECIMEN: NORMAL

## 2023-01-08 PROCEDURE — 86140 C-REACTIVE PROTEIN: CPT | Performed by: NURSE PRACTITIONER

## 2023-01-08 PROCEDURE — 80053 COMPREHEN METABOLIC PANEL: CPT | Performed by: NURSE PRACTITIONER

## 2023-01-08 PROCEDURE — 36415 COLL VENOUS BLD VENIPUNCTURE: CPT

## 2023-01-08 PROCEDURE — 85025 COMPLETE CBC W/AUTO DIFF WBC: CPT | Performed by: NURSE PRACTITIONER

## 2023-01-08 PROCEDURE — 85652 RBC SED RATE AUTOMATED: CPT | Performed by: NURSE PRACTITIONER

## 2023-01-08 PROCEDURE — 74018 RADEX ABDOMEN 1 VIEW: CPT

## 2023-01-08 PROCEDURE — 99283 EMERGENCY DEPT VISIT LOW MDM: CPT

## 2023-01-08 RX ORDER — PHENAZOPYRIDINE HYDROCHLORIDE 200 MG/1
200 TABLET, FILM COATED ORAL ONCE
Status: COMPLETED | OUTPATIENT
Start: 2023-01-08 | End: 2023-01-08

## 2023-01-08 RX ADMIN — PHENAZOPYRIDINE HYDROCHLORIDE 200 MG: 200 TABLET ORAL at 06:44

## 2023-01-08 NOTE — ED NOTES
Spoke with patient at  this time. Informed her that a urine sample has been ordered. She states that she cannot provide a sample at this time. She also stated that she would like to go home if nothing else is going to be done for her. She stated that she has been here since 3 AM and wants to go home. YEIMI Gerard aware of patient's request.

## 2023-01-08 NOTE — ED NOTES
Patient signed out AMA at this time. Verbalized understanding that a risk of leaving could be death. She was taken to Lobby via wheelchair. A&OX4. She did not have an IV. She called taxi and will await taxi in lobby.

## 2023-01-12 ENCOUNTER — OFFICE VISIT (OUTPATIENT)
Dept: FAMILY MEDICINE CLINIC | Facility: CLINIC | Age: 38
End: 2023-01-12
Payer: COMMERCIAL

## 2023-01-12 VITALS
OXYGEN SATURATION: 99 % | TEMPERATURE: 96.2 F | BODY MASS INDEX: 49.17 KG/M2 | DIASTOLIC BLOOD PRESSURE: 90 MMHG | SYSTOLIC BLOOD PRESSURE: 140 MMHG | HEIGHT: 64 IN | HEART RATE: 110 BPM | WEIGHT: 288 LBS

## 2023-01-12 DIAGNOSIS — I10 PRIMARY HYPERTENSION: Chronic | ICD-10-CM

## 2023-01-12 DIAGNOSIS — E03.9 HYPOTHYROIDISM, UNSPECIFIED TYPE: Chronic | ICD-10-CM

## 2023-01-12 DIAGNOSIS — F33.0 MAJOR DEPRESSIVE DISORDER, RECURRENT EPISODE, MILD DEGREE: Chronic | ICD-10-CM

## 2023-01-12 DIAGNOSIS — M10.9 GOUT, UNSPECIFIED CAUSE, UNSPECIFIED CHRONICITY, UNSPECIFIED SITE: Chronic | ICD-10-CM

## 2023-01-12 DIAGNOSIS — D68.51 FACTOR 5 LEIDEN MUTATION, HETEROZYGOUS: Chronic | ICD-10-CM

## 2023-01-12 DIAGNOSIS — R05.9 COUGH, UNSPECIFIED TYPE: ICD-10-CM

## 2023-01-12 DIAGNOSIS — Z79.4 TYPE 2 DIABETES MELLITUS WITH HYPERGLYCEMIA, WITH LONG-TERM CURRENT USE OF INSULIN: Primary | Chronic | ICD-10-CM

## 2023-01-12 DIAGNOSIS — J01.41 ACUTE RECURRENT PANSINUSITIS: ICD-10-CM

## 2023-01-12 DIAGNOSIS — E11.65 TYPE 2 DIABETES MELLITUS WITH HYPERGLYCEMIA, WITH LONG-TERM CURRENT USE OF INSULIN: Primary | Chronic | ICD-10-CM

## 2023-01-12 LAB
B PARAPERT DNA SPEC QL NAA+PROBE: NOT DETECTED
B PERT DNA SPEC QL NAA+PROBE: NOT DETECTED
C PNEUM DNA NPH QL NAA+NON-PROBE: NOT DETECTED
FLUAV SUBTYP SPEC NAA+PROBE: NOT DETECTED
FLUBV RNA ISLT QL NAA+PROBE: NOT DETECTED
HADV DNA SPEC NAA+PROBE: NOT DETECTED
HCOV 229E RNA SPEC QL NAA+PROBE: NOT DETECTED
HCOV HKU1 RNA SPEC QL NAA+PROBE: NOT DETECTED
HCOV NL63 RNA SPEC QL NAA+PROBE: NOT DETECTED
HCOV OC43 RNA SPEC QL NAA+PROBE: NOT DETECTED
HMPV RNA NPH QL NAA+NON-PROBE: NOT DETECTED
HPIV1 RNA ISLT QL NAA+PROBE: NOT DETECTED
HPIV2 RNA SPEC QL NAA+PROBE: NOT DETECTED
HPIV3 RNA NPH QL NAA+PROBE: NOT DETECTED
HPIV4 P GENE NPH QL NAA+PROBE: NOT DETECTED
M PNEUMO IGG SER IA-ACNC: NOT DETECTED
RHINOVIRUS RNA SPEC NAA+PROBE: NOT DETECTED
RSV RNA NPH QL NAA+NON-PROBE: NOT DETECTED
SARS-COV-2 RNA NPH QL NAA+NON-PROBE: NOT DETECTED

## 2023-01-12 PROCEDURE — 95251 CONT GLUC MNTR ANALYSIS I&R: CPT | Performed by: NURSE PRACTITIONER

## 2023-01-12 PROCEDURE — 99214 OFFICE O/P EST MOD 30 MIN: CPT | Performed by: NURSE PRACTITIONER

## 2023-01-12 PROCEDURE — 0202U NFCT DS 22 TRGT SARS-COV-2: CPT | Performed by: NURSE PRACTITIONER

## 2023-01-12 PROCEDURE — 84550 ASSAY OF BLOOD/URIC ACID: CPT | Performed by: NURSE PRACTITIONER

## 2023-01-12 PROCEDURE — 80061 LIPID PANEL: CPT | Performed by: NURSE PRACTITIONER

## 2023-01-12 RX ORDER — DULOXETIN HYDROCHLORIDE 30 MG/1
30 CAPSULE, DELAYED RELEASE ORAL DAILY
Qty: 30 CAPSULE | Refills: 1 | Status: SHIPPED | OUTPATIENT
Start: 2023-01-12 | End: 2023-03-28 | Stop reason: SDUPTHER

## 2023-01-12 RX ORDER — FLUCONAZOLE 150 MG/1
150 TABLET ORAL DAILY
Qty: 2 TABLET | Refills: 0 | Status: SHIPPED | OUTPATIENT
Start: 2023-01-12 | End: 2023-03-31

## 2023-01-12 RX ORDER — AZITHROMYCIN 250 MG/1
TABLET, FILM COATED ORAL
Qty: 6 TABLET | Refills: 0 | Status: SHIPPED | OUTPATIENT
Start: 2023-01-12 | End: 2023-03-31

## 2023-01-12 RX ORDER — DEXTROMETHORPHAN HYDROBROMIDE AND PROMETHAZINE HYDROCHLORIDE 15; 6.25 MG/5ML; MG/5ML
5 SYRUP ORAL 2 TIMES DAILY PRN
Qty: 180 ML | Refills: 0 | Status: SHIPPED | OUTPATIENT
Start: 2023-01-12 | End: 2023-01-30 | Stop reason: SDUPTHER

## 2023-01-12 RX ORDER — INSULIN PMP CART,AUT,G6/7,CNTR
1 EACH SUBCUTANEOUS EVERY OTHER DAY
Qty: 15 EACH | Refills: 3 | Status: SHIPPED | OUTPATIENT
Start: 2023-01-12 | End: 2023-03-31

## 2023-01-12 RX ORDER — INSULIN PMP CART,AUT,G6/7,CNTR
1 EACH SUBCUTANEOUS DAILY
Qty: 1 KIT | Refills: 0 | Status: SHIPPED | OUTPATIENT
Start: 2023-01-12 | End: 2023-03-31

## 2023-01-12 NOTE — PROGRESS NOTES
History of Present Illness  Natalia Arzate is a 37 y.o. female who presents to the clinic for follow-up due to her multiple medical problems.Natalia has been diagnosed with Factor V Leiden, DM, type 2 with neuropathy currently treated with insulin.  In addition, she has HTN, Dyslipidemia, Hypothyroidism, Renal Stones and Gout.  Natalia is complaining of respiratory symptoms times today.    She was hospitalized at [facility] from 12/26/2021 until 2/15/2022.  She was admitted for a disseminated infection.  She presented emergently at [facility] on 12/26/2021 following a fall at home. She was transferred to . She was  diagnosed with L Pyelonephritis complicated by multiple abscesses as well as superinfection of L iliopsoas hematoma on admission at [facility]. During admission, she developed acute hypoxic respiratory failure presumed due to aspiration and septic shock. She was transferred to ICU. She was intubated and required CRRT/HD while in ICU. Eventually transferred to the floor no longer requiring HD. She does have Oxygen at her home which she uses intermittently when she develops shortness of air.  During hospitalization, she had an acute/subacute R cerebellar ischemic stroke on 1/26/2022 with recommendation to continue on Anticoagulation.     Diabetes  She has type 2 diabetes mellitus treated with insulin both basal and fast acting. She is using a DexCom which is helping her.  Risk factors for coronary artery disease include diabetes mellitus, dyslipidemia, hypertension, obesity, stress and sedentary lifestyle.     Lab Results   Component Value Date    HGBA1C 5.60 04/10/2022     Lab Results   Component Value Date    HGBA1C 8.50 (H) 07/14/2022     Lab Results   Component Value Date    HGBA1C 9 12/01/2022     Lab Results   Component Value Date    HGBA1C 8.9 12/01/2022     Hypertension  Currently taking Amlodipine and Metoprolol.    Lab Results   Component Value Date    CREATININE 0.69 01/08/2023      Dyslipidemia  Previously prescribed Omega which she has not been taking.      Lab Results   Component Value Date    CHOL 157 12/01/2022    TRIG 243 (H) 12/01/2022    HDL 22 (L) 12/01/2022    LDL 93 12/01/2022     Hypothyroidism  Had previously been prescribed Levothyroxine but her last TSH was in range without medication  Lab Results   Component Value Date    TSH 3.890 12/01/2022     Kidney Disease  Previous Hydronephrosis and obstructing stone as well as nonobstructing renal stones.  She has had multiple procedures per urologist, Dr. Motley and  medical team.  Left nephrectomy per  medical urology team in October 2022.    Back Pain  Shares she continues to use her walker for support during ambulation.  She is now able to go upstairs to her home with some difficulty.    The following portions of the patient's history were reviewed and updated as appropriate: allergies, current medications, past family history, past medical history, past social history, past surgical history and problem list.    Review of Systems   Constitutional: Positive for fatigue and fever. Negative for activity change, appetite change, chills and unexpected weight change.   HENT: Positive for congestion, ear pain, postnasal drip, rhinorrhea, sinus pressure and sinus pain. Negative for voice change.    Eyes: Negative for pain, discharge, redness, itching and visual disturbance.   Respiratory: Positive for cough and wheezing (Mild). Negative for shortness of breath.    Cardiovascular: Positive for leg swelling. Negative for chest pain and palpitations.   Gastrointestinal: Negative for nausea and vomiting.   Endocrine: Negative for cold intolerance, heat intolerance, polydipsia, polyphagia and polyuria.   Musculoskeletal: Positive for arthralgias and gait problem.   Skin: Negative for color change and rash.   Allergic/Immunologic: Positive for environmental allergies.   Neurological: Positive for numbness (Left foot numbness secondary to  "CVA) and headaches. Negative for dizziness, tremors, speech difficulty, weakness and light-headedness.   Hematological: Negative for adenopathy.   Psychiatric/Behavioral: Negative for confusion, decreased concentration and suicidal ideas. The patient is not nervous/anxious.    All other systems reviewed and are negative.    Vital signs:  /90 (BP Location: Left arm, Patient Position: Sitting, Cuff Size: Adult)   Pulse 110   Temp 96.2 °F (35.7 °C) (Temporal)   Ht 162.6 cm (64.02\")   Wt 131 kg (288 lb)   LMP 10/31/2017   SpO2 99%   BMI 49.41 kg/m²     Physical Exam  Vitals and nursing note reviewed.   Constitutional:       General: She is not in acute distress.     Appearance: She is well-developed.      Comments: Sitting in a wheelchair.  Her daughter is with her.   HENT:      Head: Normocephalic.      Right Ear: A middle ear effusion is present. Tympanic membrane is bulging. Tympanic membrane is not erythematous.      Left Ear: A middle ear effusion is present. Tympanic membrane is bulging. Tympanic membrane is not erythematous.      Nose: Congestion and rhinorrhea present.      Right Turbinates: Enlarged.      Left Turbinates: Enlarged.      Right Sinus: Maxillary sinus tenderness and frontal sinus tenderness present.      Left Sinus: Maxillary sinus tenderness and frontal sinus tenderness present.      Mouth/Throat:      Mouth: Mucous membranes are moist.   Eyes:      General: No scleral icterus.        Right eye: No discharge.         Left eye: No discharge.      Conjunctiva/sclera: Conjunctivae normal.   Neck:      Thyroid: No thyromegaly.      Vascular: No JVD.   Cardiovascular:      Rate and Rhythm: Normal rate and regular rhythm.      Heart sounds: Normal heart sounds. No murmur heard.    No friction rub.   Pulmonary:      Effort: Pulmonary effort is normal. No respiratory distress.      Breath sounds: Normal breath sounds. No wheezing or rales.   Abdominal:      General: Bowel sounds are normal. " There is no distension.      Palpations: Abdomen is soft.      Tenderness: There is no abdominal tenderness. There is no guarding.   Musculoskeletal:         General: Tenderness present.      Cervical back: Neck supple.      Right lower leg: No edema.      Left lower leg: No edema.   Lymphadenopathy:      Cervical: No cervical adenopathy.   Skin:     General: Skin is warm and dry.      Capillary Refill: Capillary refill takes less than 2 seconds.      Findings: No erythema or rash.   Neurological:      Mental Status: She is alert and oriented to person, place, and time.      Motor: Weakness present.      Gait: Gait abnormal.   Psychiatric:         Mood and Affect: Mood and affect normal.         Speech: Speech normal.         Behavior: Behavior is cooperative.         Thought Content: Thought content normal.         Cognition and Memory: Cognition and memory normal.         Judgment: Judgment normal.     Result Review : Dexcom CGM uploaded dates include Friday, December 30, 2022 through Thursday, January 12, 2023  Overview includes  70% very high   26% high   4% in range   0% low   0% very low  Results for orders placed or performed in visit on 01/12/23   Respiratory Panel PCR w/COVID-19(SARS-CoV-2) [facility] In-House, NP Swab in UTM/VTM, 3-4 HR TAT - Swab, Nasopharynx    Specimen: Nasopharynx; Swab   Result Value Ref Range    ADENOVIRUS, PCR Not Detected Not Detected    Coronavirus 229E Not Detected Not Detected    Coronavirus HKU1 Not Detected Not Detected    Coronavirus NL63 Not Detected Not Detected    Coronavirus OC43 Not Detected Not Detected    COVID19 Not Detected Not Detected - Ref. Range    Human Metapneumovirus Not Detected Not Detected    Human Rhinovirus/Enterovirus Not Detected Not Detected    Influenza A PCR Not Detected Not Detected    Influenza B PCR Not Detected Not Detected    Parainfluenza Virus 1 Not Detected Not Detected    Parainfluenza Virus 2 Not Detected Not Detected     Parainfluenza Virus 3 Not Detected Not Detected    Parainfluenza Virus 4 Not Detected Not Detected    RSV, PCR Not Detected Not Detected    Bordetella pertussis pcr Not Detected Not Detected    Bordetella parapertussis PCR Not Detected Not Detected    Chlamydophila pneumoniae PCR Not Detected Not Detected    Mycoplasma pneumo by PCR Not Detected Not Detected   Lipid Panel    Specimen: Arm, Right; Blood   Result Value Ref Range    Total Cholesterol 142 0 - 200 mg/dL    Triglycerides 219 (H) 0 - 150 mg/dL    HDL Cholesterol 18 (L) 40 - 60 mg/dL    LDL Cholesterol  87 0 - 100 mg/dL    VLDL Cholesterol 37 5 - 40 mg/dL    LDL/HDL Ratio 4.46    Uric Acid    Specimen: Arm, Right; Blood   Result Value Ref Range    Uric Acid 4.3 2.4 - 5.7 mg/dL       Class 3 Severe Obesity (BMI >=40). Obesity-related health conditions include the following: hypertension, diabetes mellitus, dyslipidemias and GERD. Obesity is improving with lifestyle modificationsBMI is is above average; BMI management plan is completed.  Provided information on portion control and increasing exercise.     Assessment & Plan     Diagnoses and all orders for this visit:    1. Type 2 diabetes mellitus with hyperglycemia, with long-term current use of insulin (HCC) (Primary)  Comments:  Not at goal.  OmniPod 5 G6 prescribed.  She will increase her Lantus by additional 5 units twice daily.  Increased Ozempic  Orders:  -     Insulin Disposable Pump (Omnipod 5 G6 Intro, Gen 5,) kit; 1 Device Daily.  Dispense: 1 kit; Refill: 0  -     Insulin Disposable Pump (Omnipod 5 G6 Pod, Gen 5,) misc; 1 Device Every Other Day.  Dispense: 15 each; Refill: 3  -     Lipid Panel    2. Primary hypertension  Comments:  Continue amlodipine, metoprolol.  Will consider ACE or ARB    3. Major depressive disorder, recurrent episode, mild degree (HCC)  Comments:  Responding well to Cymbalta.    Orders:  -     DULoxetine (CYMBALTA) 30 MG capsule; Take 1 capsule by mouth Daily.  Dispense: 30  capsule; Refill: 1    4. Gout, unspecified cause, unspecified chronicity, unspecified site  Comments:  Uric acid level checked today  Orders:  -     Uric Acid    5. Factor 5 Leiden mutation, heterozygous (HCC)  Comments:  December hematology note reviewed.  To continue lifelong Eliquis unless complications develop.    6. Hypothyroidism, unspecified type  Comments:  Continue to monitor.  Currently in normal range without levothyroxine    7. Acute recurrent pansinusitis  Comments:  Zithromax prescribed.  Orders:  -     azithromycin (Zithromax Z-Wilner) 250 MG tablet; Take 2 tablets by mouth on day 1, then 1 tablet daily on days 2-5  Dispense: 6 tablet; Refill: 0  -     CBC & Differential; Future    8. Cough, unspecified type  Comments:  Promethazine DM refilled  Orders:  -     Respiratory Panel PCR w/COVID-19(SARS-CoV-2) [facility] In-House, NP Swab in UTM/VTM, 3-4 HR TAT - Swab, Nasopharynx; Future  -     promethazine-dextromethorphan (PROMETHAZINE-DM) 6.25-15 MG/5ML syrup; Take 5 mL by mouth 2 (Two) Times a Day As Needed for Cough.  Dispense: 180 mL; Refill: 0  -     Respiratory Panel PCR w/COVID-19(SARS-CoV-2) [facility] In-House, NP Swab in UTM/VTM, 3-4 HR TAT - Swab, Nasopharynx    Other orders  -     fluconazole (Diflucan) 150 MG tablet; Take 1 tablet by mouth Daily.  Dispense: 2 tablet; Refill: 0      Follow Up in March  Findings and recommendations discussed with Natalia.  Reviewed Dexcom upload with her. Reviewed treatment options. OmniPod 5 G6 prescribed.  Prescription sent to her pharmacy, [facility].  Instructed her if this cannot filled per her pharmacy to try [facility].  If she has any issues to please let me know and I will advise.  Lifestyle modifications reinforced including nutrition and activity recommendations.  Natalia will follow up in March sooner if problems/concerns occur.  She was given instructions and counseling regarding her condition or for health maintenance  advice. Please see specific information pulled into the AVS if appropriate    This document has been electronically signed by:

## 2023-01-13 LAB
CHOLEST SERPL-MCNC: 142 MG/DL (ref 0–200)
HDLC SERPL-MCNC: 18 MG/DL (ref 40–60)
LDLC SERPL CALC-MCNC: 87 MG/DL (ref 0–100)
LDLC/HDLC SERPL: 4.46 {RATIO}
TRIGL SERPL-MCNC: 219 MG/DL (ref 0–150)
URATE SERPL-MCNC: 4.3 MG/DL (ref 2.4–5.7)
VLDLC SERPL-MCNC: 37 MG/DL (ref 5–40)

## 2023-01-15 NOTE — ED PROVIDER NOTES
Subjective   History of Present Illness  37-year-old female who presents to the ED today for right flank pain.  She states that she is having right flank pain that radiates down into her groin with difficulty urinating.  She states this has been going on for couple days and is getting worse.  She states she is tried Tylenol at home with no relief.  Patient denies any fever, cough, nausea, vomiting, shortness of breath, known sick contacts, recent travel or any additional symptoms today.  Patient denies any alleviating or worsening factors.    History provided by:  Patient   used: No        Review of Systems   Constitutional: Negative.  Negative for fever.   HENT: Negative.    Respiratory: Negative.    Cardiovascular: Negative.  Negative for chest pain.   Gastrointestinal: Negative.  Negative for abdominal pain.   Endocrine: Negative.    Genitourinary: Positive for flank pain. Negative for dysuria.   Skin: Negative.    Neurological: Negative.    Psychiatric/Behavioral: Negative.    All other systems reviewed and are negative.      Past Medical History:   Diagnosis Date   • A-fib (HCC)    • Abnormal ECG    • Anemia    • Anxiety    • Asthma    • Cancer (HCC)     Ovarian   • Depression    • Diabetes mellitus (HCC)    • DVT (deep venous thrombosis) (HCC)    • Factor 5 Leiden mutation, heterozygous (HCC)    • Fibroid    • GERD (gastroesophageal reflux disease)    • Gout    • H/O abdominal abscess    • History of sepsis    • History of transfusion    • Hyperlipidemia    • Hypothyroid    • Kidney stone    • Migraines    • Neuropathy    • Ovarian cancer (HCC) 02/2021   • Ovarian cyst    • PE (pulmonary embolism)    • Polycystic ovary syndrome    • Preeclampsia    • Rh incompatibility    • Stroke (HCC)    • TIA (transient ischemic attack)    • Urinary tract infection    • Varicella        Allergies   Allergen Reactions   • Toradol [Ketorolac Tromethamine] Anaphylaxis and Hives   • Haldol [Haloperidol]  Hives and Mental Status Change   • Tramadol Hives and Swelling   • Amoxicillin Hives and Rash   • Penicillins Hives and Rash       Past Surgical History:   Procedure Laterality Date   • ABDOMINAL SURGERY     • CARDIAC CATHETERIZATION     •  SECTION     • CHOLECYSTECTOMY     • COLONOSCOPY     • ENDOSCOPY     • EXTRACORPOREAL SHOCK WAVE LITHOTRIPSY (ESWL) Left 10/22/2021    Procedure: EXTRACORPOREAL SHOCKWAVE LITHOTRIPSY;  Surgeon: Milan Motley MD;  Location: [facility];  Service: Urology;  Laterality: Left;   • LITHOTRIPSY     • RIGHT OOPHORECTOMY     • URETEROSCOPY LASER LITHOTRIPSY WITH STENT INSERTION Left 10/01/2021    Procedure: URETEROSCOPY WITH STENT PLACEMENT;  Surgeon: Milan Motley MD;  Location: [facility];  Service: Urology;  Laterality: Left;   • URETEROSCOPY LASER LITHOTRIPSY WITH STENT INSERTION Left 2022    Procedure: URETEROSCOPY STENT REMOVAL;  Surgeon: Milan Motley MD;  Location: [facility];  Service: Urology;  Laterality: Left;   • URETEROSCOPY LASER LITHOTRIPSY WITH STENT INSERTION Left 2022    Procedure: CYSTOSCOPY RETROGRADE LEFT WITH STENT PLACEMENT;  Surgeon: Milan Motley MD;  Location: [facility];  Service: Urology;  Laterality: Left;       Family History   Problem Relation Age of Onset   • Hypertension Mother    • Diabetes Father    • Hypertension Father    • Heart attack Father    • No Known Problems Sister    • No Known Problems Brother    • No Known Problems Son    • No Known Problems Daughter    • No Known Problems Maternal Grandmother    • No Known Problems Maternal Grandfather    • No Known Problems Paternal Grandmother    • No Known Problems Paternal Grandfather    • No Known Problems Cousin    • Rheum arthritis Neg Hx    • Osteoarthritis Neg Hx    • Asthma Neg Hx    • Heart failure Neg Hx    • Hyperlipidemia Neg Hx    • Migraines Neg Hx    • Rashes / Skin problems Neg Hx    • Seizures Neg Hx    • Stroke Neg Hx    • Thyroid  "disease Neg Hx        Social History     Socioeconomic History   • Marital status:    Tobacco Use   • Smoking status: Never   • Smokeless tobacco: Never   Vaping Use   • Vaping Use: Never used   Substance and Sexual Activity   • Alcohol use: No   • Drug use: Never     Comment: Pt has track marks on right arm. Pt states \"It's from my INR being drawn.\"    • Sexual activity: Not Currently     Partners: Male     Birth control/protection: None           Objective   Physical Exam  Vitals and nursing note reviewed.   Constitutional:       General: She is not in acute distress.     Appearance: She is well-developed. She is obese. She is not diaphoretic.   HENT:      Head: Normocephalic and atraumatic.      Right Ear: External ear normal.      Left Ear: External ear normal.      Nose: Nose normal.   Eyes:      Conjunctiva/sclera: Conjunctivae normal.      Pupils: Pupils are equal, round, and reactive to light.   Neck:      Vascular: No JVD.      Trachea: No tracheal deviation.   Cardiovascular:      Rate and Rhythm: Regular rhythm. Tachycardia present.      Heart sounds: Normal heart sounds. No murmur heard.  Pulmonary:      Effort: Pulmonary effort is normal. No respiratory distress.      Breath sounds: Normal breath sounds. No wheezing.   Abdominal:      General: Bowel sounds are normal.      Palpations: Abdomen is soft.      Tenderness: There is no abdominal tenderness.   Musculoskeletal:         General: No deformity. Normal range of motion.      Cervical back: Normal range of motion and neck supple.   Skin:     General: Skin is warm and dry.      Capillary Refill: Capillary refill takes 2 to 3 seconds.      Coloration: Skin is not pale.      Findings: No erythema or rash.   Neurological:      Mental Status: She is alert and oriented to person, place, and time.      Cranial Nerves: No cranial nerve deficit.   Psychiatric:         Mood and Affect: Mood normal.         Behavior: Behavior normal.         Thought " Content: Thought content normal.         Procedures       Results for orders placed or performed during the hospital encounter of 01/08/23   Comprehensive Metabolic Panel    Specimen: Blood   Result Value Ref Range    Glucose 366 (H) 65 - 99 mg/dL    BUN 15 6 - 20 mg/dL    Creatinine 0.69 0.57 - 1.00 mg/dL    Sodium 128 (L) 136 - 145 mmol/L    Potassium 4.2 3.5 - 5.2 mmol/L    Chloride 95 (L) 98 - 107 mmol/L    CO2 18.2 (L) 22.0 - 29.0 mmol/L    Calcium 9.5 8.6 - 10.5 mg/dL    Total Protein 7.9 6.0 - 8.5 g/dL    Albumin 3.3 (L) 3.5 - 5.2 g/dL    ALT (SGPT) 9 1 - 33 U/L    AST (SGOT) 15 1 - 32 U/L    Alkaline Phosphatase 122 (H) 39 - 117 U/L    Total Bilirubin 0.3 0.0 - 1.2 mg/dL    Globulin 4.6 gm/dL    A/G Ratio 0.7 g/dL    BUN/Creatinine Ratio 21.7 7.0 - 25.0    Anion Gap 14.8 5.0 - 15.0 mmol/L    eGFR 115.5 >60.0 mL/min/1.73   C-reactive Protein    Specimen: Blood   Result Value Ref Range    C-Reactive Protein 12.75 (H) 0.00 - 0.50 mg/dL   Sedimentation Rate    Specimen: Blood   Result Value Ref Range    Sed Rate 124 (H) 0 - 20 mm/hr   CBC Auto Differential    Specimen: Blood   Result Value Ref Range    WBC 11.58 (H) 3.40 - 10.80 10*3/mm3    RBC 3.87 3.77 - 5.28 10*6/mm3    Hemoglobin 10.2 (L) 12.0 - 15.9 g/dL    Hematocrit 32.5 (L) 34.0 - 46.6 %    MCV 84.0 79.0 - 97.0 fL    MCH 26.4 (L) 26.6 - 33.0 pg    MCHC 31.4 (L) 31.5 - 35.7 g/dL    RDW 16.0 (H) 12.3 - 15.4 %    RDW-SD 48.8 37.0 - 54.0 fl    MPV 8.9 6.0 - 12.0 fL    Platelets 340 140 - 450 10*3/mm3    Neutrophil % 81.0 (H) 42.7 - 76.0 %    Lymphocyte % 10.2 (L) 19.6 - 45.3 %    Monocyte % 7.3 5.0 - 12.0 %    Eosinophil % 0.6 0.3 - 6.2 %    Basophil % 0.4 0.0 - 1.5 %    Immature Grans % 0.5 0.0 - 0.5 %    Neutrophils, Absolute 9.38 (H) 1.70 - 7.00 10*3/mm3    Lymphocytes, Absolute 1.18 0.70 - 3.10 10*3/mm3    Monocytes, Absolute 0.84 0.10 - 0.90 10*3/mm3    Eosinophils, Absolute 0.07 0.00 - 0.40 10*3/mm3    Basophils, Absolute 0.05 0.00 - 0.20 10*3/mm3     Immature Grans, Absolute 0.06 (H) 0.00 - 0.05 10*3/mm3    nRBC 0.0 0.0 - 0.2 /100 WBC   Green Top (Gel)   Result Value Ref Range    Extra Tube Hold for add-ons.    Lavender Top   Result Value Ref Range    Extra Tube hold for add-on    Gold Top - SST   Result Value Ref Range    Extra Tube Hold for add-ons.    Light Blue Top   Result Value Ref Range    Extra Tube Hold for add-ons.      XR Abdomen KUB   Final Result   Negative KUB.. No renal stones are visible      Signer Name: Gerardo Jorgensen MD    Signed: 1/8/2023 7:28 AM    Workstation Name: [facility]-     [facility]          ED Course  ED Course as of 01/15/23 0152   Sun Jan 08, 2023   0707 WBC(!): 11.58 [SM]   0708 IMPRESSION:  Negative KUB.. No renal stones are visible     Signer Name: Gerardo Jorgensen MD   Signed: 1/8/2023 7:28 AM   Workstation Name: [facility]    [facility] [SM]   0709 Patient advises nursing staff she would like to sign out AMA as her pain was not adequately treated at this time. Advised that she will go to another hospital for treatment. Patient is alert and oriented X4 and in no distress.  [SM]      ED Course User Index  [SM] Rajani Zepeda, YEIMI                                           Medical Decision Making  37-year-old female who presents to the ED today for right flank pain.  She states that she is having right flank pain that radiates down into her groin with difficulty urinating.  She states this has been going on for couple days and is getting worse.  She states she is tried Tylenol at home with no relief.  Patient denies any fever, cough, nausea, vomiting, shortness of breath, known sick contacts, recent travel or any additional symptoms today.  Patient denies any alleviating or worsening factors.      Amount and/or Complexity of Data Reviewed  Labs: ordered. Decision-making details documented in ED Course.  Radiology: ordered.      Risk  Prescription drug management.    Risk Details:  AMA        Final diagnoses:   Flank pain       ED Disposition  ED Disposition     ED Disposition   AMA    Condition   --    Comment   --             No follow-up provider specified.       Medication List      ASK your doctor about these medications    nitrofurantoin (macrocrystal-monohydrate) 100 MG capsule  Commonly known as: MACROBID  Take 1 capsule by mouth 2 (Two) Times a Day for 7 days.  Ask about: Should I take this medication?             Rajani Zepeda, APRN  01/15/23 0152

## 2023-01-30 DIAGNOSIS — R05.9 COUGH, UNSPECIFIED TYPE: ICD-10-CM

## 2023-01-30 RX ORDER — DEXTROMETHORPHAN HYDROBROMIDE AND PROMETHAZINE HYDROCHLORIDE 15; 6.25 MG/5ML; MG/5ML
5 SYRUP ORAL 2 TIMES DAILY PRN
Qty: 150 ML | Refills: 0 | Status: SHIPPED | OUTPATIENT
Start: 2023-01-30 | End: 2023-03-28 | Stop reason: SDUPTHER

## 2023-02-07 DIAGNOSIS — I10 PRIMARY HYPERTENSION: Chronic | ICD-10-CM

## 2023-02-07 DIAGNOSIS — Z79.4 TYPE 2 DIABETES MELLITUS WITH DIABETIC NEUROPATHY, WITH LONG-TERM CURRENT USE OF INSULIN: ICD-10-CM

## 2023-02-07 DIAGNOSIS — E11.40 TYPE 2 DIABETES MELLITUS WITH DIABETIC NEUROPATHY, WITH LONG-TERM CURRENT USE OF INSULIN: ICD-10-CM

## 2023-02-07 RX ORDER — METOPROLOL SUCCINATE 50 MG/1
50 TABLET, EXTENDED RELEASE ORAL DAILY
Qty: 30 TABLET | Refills: 3 | Status: CANCELLED | OUTPATIENT
Start: 2023-02-07

## 2023-02-07 RX ORDER — GABAPENTIN 300 MG/1
300 CAPSULE ORAL NIGHTLY PRN
Qty: 30 CAPSULE | Refills: 0 | Status: CANCELLED | OUTPATIENT
Start: 2023-02-07 | End: 2023-03-09

## 2023-02-07 RX ORDER — GABAPENTIN 300 MG/1
300 CAPSULE ORAL NIGHTLY PRN
Qty: 30 CAPSULE | Refills: 0 | Status: SHIPPED | OUTPATIENT
Start: 2023-02-07 | End: 2023-03-09

## 2023-02-07 RX ORDER — METOPROLOL SUCCINATE 50 MG/1
50 TABLET, EXTENDED RELEASE ORAL DAILY
Qty: 30 TABLET | Refills: 3 | Status: SHIPPED | OUTPATIENT
Start: 2023-02-07

## 2023-02-27 ENCOUNTER — PRIOR AUTHORIZATION (OUTPATIENT)
Dept: FAMILY MEDICINE CLINIC | Facility: CLINIC | Age: 38
End: 2023-02-27
Payer: COMMERCIAL

## 2023-03-21 ENCOUNTER — HOSPITAL ENCOUNTER (EMERGENCY)
Facility: HOSPITAL | Age: 38
Discharge: HOME | End: 2023-03-21
Attending: STUDENT IN AN ORGANIZED HEALTH CARE EDUCATION/TRAINING PROGRAM | Admitting: STUDENT IN AN ORGANIZED HEALTH CARE EDUCATION/TRAINING PROGRAM
Payer: COMMERCIAL

## 2023-03-21 ENCOUNTER — APPOINTMENT (OUTPATIENT)
Dept: CT IMAGING | Facility: HOSPITAL | Age: 38
End: 2023-03-21
Payer: COMMERCIAL

## 2023-03-21 VITALS
TEMPERATURE: 97.9 F | OXYGEN SATURATION: 96 % | DIASTOLIC BLOOD PRESSURE: 89 MMHG | RESPIRATION RATE: 16 BRPM | WEIGHT: 283 LBS | BODY MASS INDEX: 48.32 KG/M2 | HEART RATE: 107 BPM | HEIGHT: 64 IN | SYSTOLIC BLOOD PRESSURE: 138 MMHG

## 2023-03-21 DIAGNOSIS — G89.18 POST-OPERATIVE PAIN: Primary | ICD-10-CM

## 2023-03-21 LAB
ALBUMIN SERPL-MCNC: 4.4 G/DL (ref 3.5–5.2)
ALBUMIN/GLOB SERPL: 1 G/DL
ALP SERPL-CCNC: 123 U/L (ref 39–117)
ALT SERPL W P-5'-P-CCNC: 18 U/L (ref 1–33)
ANION GAP SERPL CALCULATED.3IONS-SCNC: 15.1 MMOL/L (ref 5–15)
AST SERPL-CCNC: 30 U/L (ref 1–32)
BASOPHILS # BLD AUTO: 0.11 10*3/MM3 (ref 0–0.2)
BASOPHILS NFR BLD AUTO: 2 % (ref 0–1.5)
BILIRUB SERPL-MCNC: 0.4 MG/DL (ref 0–1.2)
BUN SERPL-MCNC: 18 MG/DL (ref 6–20)
BUN/CREAT SERPL: 24.7 (ref 7–25)
CALCIUM SPEC-SCNC: 9.8 MG/DL (ref 8.6–10.5)
CHLORIDE SERPL-SCNC: 98 MMOL/L (ref 98–107)
CO2 SERPL-SCNC: 18.9 MMOL/L (ref 22–29)
CREAT SERPL-MCNC: 0.73 MG/DL (ref 0.57–1)
D-LACTATE SERPL-SCNC: 3.5 MMOL/L (ref 0.5–2)
DEPRECATED RDW RBC AUTO: 68 FL (ref 37–54)
EGFRCR SERPLBLD CKD-EPI 2021: 109.5 ML/MIN/1.73
EOSINOPHIL # BLD AUTO: 0.82 10*3/MM3 (ref 0–0.4)
EOSINOPHIL NFR BLD AUTO: 15.2 % (ref 0.3–6.2)
ERYTHROCYTE [DISTWIDTH] IN BLOOD BY AUTOMATED COUNT: 21.7 % (ref 12.3–15.4)
GLOBULIN UR ELPH-MCNC: 4.4 GM/DL
GLUCOSE SERPL-MCNC: 250 MG/DL (ref 65–99)
HCG SERPL QL: NEGATIVE
HCT VFR BLD AUTO: 35.8 % (ref 34–46.6)
HGB BLD-MCNC: 11.4 G/DL (ref 12–15.9)
IMM GRANULOCYTES # BLD AUTO: 0.01 10*3/MM3 (ref 0–0.05)
IMM GRANULOCYTES NFR BLD AUTO: 0.2 % (ref 0–0.5)
LIPASE SERPL-CCNC: 54 U/L (ref 13–60)
LYMPHOCYTES # BLD AUTO: 1.71 10*3/MM3 (ref 0.7–3.1)
LYMPHOCYTES NFR BLD AUTO: 31.7 % (ref 19.6–45.3)
MCH RBC QN AUTO: 28.1 PG (ref 26.6–33)
MCHC RBC AUTO-ENTMCNC: 31.8 G/DL (ref 31.5–35.7)
MCV RBC AUTO: 88.4 FL (ref 79–97)
MONOCYTES # BLD AUTO: 0.51 10*3/MM3 (ref 0.1–0.9)
MONOCYTES NFR BLD AUTO: 9.5 % (ref 5–12)
NEUTROPHILS NFR BLD AUTO: 2.23 10*3/MM3 (ref 1.7–7)
NEUTROPHILS NFR BLD AUTO: 41.4 % (ref 42.7–76)
NRBC BLD AUTO-RTO: 0 /100 WBC (ref 0–0.2)
PLATELET # BLD AUTO: 277 10*3/MM3 (ref 140–450)
PMV BLD AUTO: 8.2 FL (ref 6–12)
POTASSIUM SERPL-SCNC: 4.3 MMOL/L (ref 3.5–5.2)
PROT SERPL-MCNC: 8.8 G/DL (ref 6–8.5)
RBC # BLD AUTO: 4.05 10*6/MM3 (ref 3.77–5.28)
SODIUM SERPL-SCNC: 132 MMOL/L (ref 136–145)
WBC NRBC COR # BLD: 5.39 10*3/MM3 (ref 3.4–10.8)

## 2023-03-21 PROCEDURE — 83605 ASSAY OF LACTIC ACID: CPT | Performed by: STUDENT IN AN ORGANIZED HEALTH CARE EDUCATION/TRAINING PROGRAM

## 2023-03-21 PROCEDURE — 84703 CHORIONIC GONADOTROPIN ASSAY: CPT | Performed by: STUDENT IN AN ORGANIZED HEALTH CARE EDUCATION/TRAINING PROGRAM

## 2023-03-21 PROCEDURE — 74176 CT ABD & PELVIS W/O CONTRAST: CPT

## 2023-03-21 PROCEDURE — 99283 EMERGENCY DEPT VISIT LOW MDM: CPT

## 2023-03-21 PROCEDURE — 85025 COMPLETE CBC W/AUTO DIFF WBC: CPT | Performed by: STUDENT IN AN ORGANIZED HEALTH CARE EDUCATION/TRAINING PROGRAM

## 2023-03-21 PROCEDURE — 25010000002 MORPHINE PER 10 MG: Performed by: STUDENT IN AN ORGANIZED HEALTH CARE EDUCATION/TRAINING PROGRAM

## 2023-03-21 PROCEDURE — 80053 COMPREHEN METABOLIC PANEL: CPT | Performed by: STUDENT IN AN ORGANIZED HEALTH CARE EDUCATION/TRAINING PROGRAM

## 2023-03-21 PROCEDURE — 96374 THER/PROPH/DIAG INJ IV PUSH: CPT

## 2023-03-21 PROCEDURE — 83690 ASSAY OF LIPASE: CPT | Performed by: STUDENT IN AN ORGANIZED HEALTH CARE EDUCATION/TRAINING PROGRAM

## 2023-03-21 PROCEDURE — 36415 COLL VENOUS BLD VENIPUNCTURE: CPT

## 2023-03-21 PROCEDURE — 25010000002 ONDANSETRON PER 1 MG: Performed by: STUDENT IN AN ORGANIZED HEALTH CARE EDUCATION/TRAINING PROGRAM

## 2023-03-21 PROCEDURE — 96376 TX/PRO/DX INJ SAME DRUG ADON: CPT

## 2023-03-21 PROCEDURE — 96375 TX/PRO/DX INJ NEW DRUG ADDON: CPT

## 2023-03-21 RX ORDER — MORPHINE SULFATE 2 MG/ML
2 INJECTION, SOLUTION INTRAMUSCULAR; INTRAVENOUS ONCE
Status: COMPLETED | OUTPATIENT
Start: 2023-03-21 | End: 2023-03-21

## 2023-03-21 RX ORDER — ONDANSETRON 2 MG/ML
4 INJECTION INTRAMUSCULAR; INTRAVENOUS ONCE
Status: COMPLETED | OUTPATIENT
Start: 2023-03-21 | End: 2023-03-21

## 2023-03-21 RX ADMIN — MORPHINE SULFATE 4 MG: 4 INJECTION, SOLUTION INTRAMUSCULAR; INTRAVENOUS at 05:36

## 2023-03-21 RX ADMIN — MORPHINE SULFATE 2 MG: 2 INJECTION, SOLUTION INTRAMUSCULAR; INTRAVENOUS at 04:05

## 2023-03-21 RX ADMIN — ONDANSETRON 4 MG: 2 INJECTION INTRAMUSCULAR; INTRAVENOUS at 04:06

## 2023-03-21 NOTE — DISCHARGE INSTRUCTIONS
Make sure to go to your appointment on Thursday at [facility] for postoperative appointment.

## 2023-03-21 NOTE — ED NOTES
MEDICAL SCREENING:    Reason for Visit: abdominal pain     Patient initially seen in triage.  The patient was advised further evaluation and diagnostic testing will be needed, some of the treatment and testing will be initiated in the lobby in order to begin the process.  The patient will be returned to the waiting area for the time being and possibly be re-assessed by a subsequent ED provider.  The patient will be brought back to the treatment area in as timely manner as possible.       Mary Thao MD  03/21/23 0257

## 2023-03-21 NOTE — ED PROVIDER NOTES
Subjective   History of Present Illness  37-year-old female presents to the ER with complaints of abdominal pain.  Patient is chronically ill despite her age.  Patient recently was at [facility] in which she had a drain placed in her abdomen.  This was secondary to infection.  Patient has been discharged from their services with follow-up within the next 48 hours.  Patient is currently on a regimen of Rocephin IV through PICC line established within the left upper extremity.  Patient states that she is concerned that she is becoming increasingly sick secondary to her drain.  Patient also feels that she is developing a kidney stone within her right kidney.        Review of Systems   Constitutional: Negative.  Negative for fever.   HENT: Negative.    Respiratory: Negative.    Cardiovascular: Negative.  Negative for chest pain.   Gastrointestinal: Positive for abdominal pain.   Endocrine: Negative.    Genitourinary: Positive for flank pain. Negative for dysuria.   Musculoskeletal: Positive for gait problem.   Skin: Positive for wound.   Psychiatric/Behavioral: Negative.    All other systems reviewed and are negative.      Past Medical History:   Diagnosis Date   • A-fib (HCC)    • Abnormal ECG    • Anemia    • Anxiety    • Asthma    • Cancer (HCC)     Ovarian   • Depression    • Diabetes mellitus (HCC)    • DVT (deep venous thrombosis) (HCC)    • Factor 5 Leiden mutation, heterozygous (HCC)    • Fibroid    • GERD (gastroesophageal reflux disease)    • Gout    • H/O abdominal abscess    • History of sepsis    • History of transfusion    • Hyperlipidemia    • Hypothyroid    • Kidney stone    • Migraines    • Neuropathy    • Ovarian cancer (HCC) 02/2021   • Ovarian cyst    • PE (pulmonary embolism)    • Polycystic ovary syndrome    • Preeclampsia    • Rh incompatibility    • Stroke (HCC)    • TIA (transient ischemic attack)    • Urinary tract infection    • Varicella        Allergies   Allergen  Reactions   • Toradol [Ketorolac Tromethamine] Anaphylaxis and Hives   • Haldol [Haloperidol] Hives and Mental Status Change   • Tramadol Hives and Swelling   • Amoxicillin Hives and Rash   • Penicillins Hives and Rash       Past Surgical History:   Procedure Laterality Date   • ABDOMINAL SURGERY     • CARDIAC CATHETERIZATION     •  SECTION     • CHOLECYSTECTOMY     • COLONOSCOPY     • ENDOSCOPY     • EXTRACORPOREAL SHOCK WAVE LITHOTRIPSY (ESWL) Left 10/22/2021    Procedure: EXTRACORPOREAL SHOCKWAVE LITHOTRIPSY;  Surgeon: Milan Motley MD;  Location: [facility];  Service: Urology;  Laterality: Left;   • LITHOTRIPSY     • RIGHT OOPHORECTOMY     • URETEROSCOPY LASER LITHOTRIPSY WITH STENT INSERTION Left 10/01/2021    Procedure: URETEROSCOPY WITH STENT PLACEMENT;  Surgeon: Milan Motley MD;  Location: [facility];  Service: Urology;  Laterality: Left;   • URETEROSCOPY LASER LITHOTRIPSY WITH STENT INSERTION Left 2022    Procedure: URETEROSCOPY STENT REMOVAL;  Surgeon: Milan Motley MD;  Location: [facility];  Service: Urology;  Laterality: Left;   • URETEROSCOPY LASER LITHOTRIPSY WITH STENT INSERTION Left 2022    Procedure: CYSTOSCOPY RETROGRADE LEFT WITH STENT PLACEMENT;  Surgeon: Milan Motley MD;  Location: [facility];  Service: Urology;  Laterality: Left;       Family History   Problem Relation Age of Onset   • Hypertension Mother    • Diabetes Father    • Hypertension Father    • Heart attack Father    • No Known Problems Sister    • No Known Problems Brother    • No Known Problems Son    • No Known Problems Daughter    • No Known Problems Maternal Grandmother    • No Known Problems Maternal Grandfather    • No Known Problems Paternal Grandmother    • No Known Problems Paternal Grandfather    • No Known Problems Cousin    • Rheum arthritis Neg Hx    • Osteoarthritis Neg Hx    • Asthma Neg Hx    • Heart failure Neg Hx    • Hyperlipidemia Neg Hx    • Migraines Neg  "Hx    • Rashes / Skin problems Neg Hx    • Seizures Neg Hx    • Stroke Neg Hx    • Thyroid disease Neg Hx        Social History     Socioeconomic History   • Marital status:    Tobacco Use   • Smoking status: Never   • Smokeless tobacco: Never   Vaping Use   • Vaping Use: Never used   Substance and Sexual Activity   • Alcohol use: No   • Drug use: Never     Comment: Pt has track marks on right arm. Pt states \"It's from my INR being drawn.\"    • Sexual activity: Not Currently     Partners: Male     Birth control/protection: None           Objective   Physical Exam  Vitals and nursing note reviewed.   Constitutional:       General: She is not in acute distress.     Appearance: She is well-developed. She is not diaphoretic.   HENT:      Head: Normocephalic and atraumatic.      Right Ear: External ear normal.      Left Ear: External ear normal.      Nose: Nose normal.   Eyes:      Conjunctiva/sclera: Conjunctivae normal.      Pupils: Pupils are equal, round, and reactive to light.   Neck:      Vascular: No JVD.      Trachea: No tracheal deviation.   Cardiovascular:      Rate and Rhythm: Normal rate.      Heart sounds: No murmur heard.  Pulmonary:      Effort: Pulmonary effort is normal. No respiratory distress.      Breath sounds: No wheezing.   Abdominal:      General: Bowel sounds are normal.      Palpations: Abdomen is soft.      Tenderness: There is generalized abdominal tenderness. There is right CVA tenderness.      Comments: Large draining within the left side of the abdomen.   Musculoskeletal:         General: No deformity. Normal range of motion.      Cervical back: Normal range of motion and neck supple.   Skin:     General: Skin is warm and dry.      Coloration: Skin is not pale.      Findings: No erythema or rash.   Neurological:      Mental Status: She is alert and oriented to person, place, and time.      Cranial Nerves: No cranial nerve deficit.   Psychiatric:         Behavior: Behavior normal.    "      Thought Content: Thought content normal.         Procedures           ED Course  ED Course as of 03/21/23 0523   Tue Mar 21, 2023   0511 CT abd pelvis rad interpreted:  1. Improved but persistent fluid collection in the left lateral abdomen. The drain appears in good position.  2. Nonobstructing right kidney stones. No ureteral stones or hydronephrosis. Normal urinary bladder. Surgically absent left kidney.  3. No acute findings in the GI tract. Normal appendix.  4. Stable hepatosplenomegaly.  5. Cholecystectomy without biliary obstruction. [RB]      ED Course User Index  [RB] Thierry Fisher II, PA                                           Medical Decision Making  37-year-old female presents to the ER with complaints of right-sided flank pain and postoperative pain associated with drain that was placed at [facility].    Post-operative pain: acute illness or injury     Details: CT imaging demonstrates that drain is placed in proper position.  Patient does have appointment with her surgeon on Thursday.  Patient will be treated for her pain and encouraged to follow-up with her primary care team.  Amount and/or Complexity of Data Reviewed  Labs: ordered.  Radiology: ordered. Decision-making details documented in ED Course.      Risk  Prescription drug management.          Final diagnoses:   Post-operative pain       ED Disposition  ED Disposition     ED Disposition   Discharge    Condition   Stable    Comment   --             Eddie Latif, APRN  [address] [facility] [address]  [phone]    Schedule an appointment as soon as possible for a visit            Medication List      No changes were made to your prescriptions during this visit.          Thierry Fisher II, PA  03/21/23 0523

## 2023-03-28 ENCOUNTER — TELEPHONE (OUTPATIENT)
Dept: FAMILY MEDICINE CLINIC | Facility: CLINIC | Age: 38
End: 2023-03-28

## 2023-03-28 ENCOUNTER — OFFICE VISIT (OUTPATIENT)
Dept: FAMILY MEDICINE CLINIC | Facility: CLINIC | Age: 38
End: 2023-03-28
Payer: COMMERCIAL

## 2023-03-28 VITALS
WEIGHT: 278 LBS | OXYGEN SATURATION: 99 % | HEIGHT: 64 IN | RESPIRATION RATE: 14 BRPM | HEART RATE: 101 BPM | SYSTOLIC BLOOD PRESSURE: 150 MMHG | TEMPERATURE: 98.4 F | DIASTOLIC BLOOD PRESSURE: 94 MMHG | BODY MASS INDEX: 47.46 KG/M2

## 2023-03-28 DIAGNOSIS — J45.909 MODERATE ASTHMA, UNSPECIFIED WHETHER COMPLICATED, UNSPECIFIED WHETHER PERSISTENT: Chronic | ICD-10-CM

## 2023-03-28 DIAGNOSIS — E55.9 VITAMIN D DEFICIENCY: ICD-10-CM

## 2023-03-28 DIAGNOSIS — R05.9 COUGH, UNSPECIFIED TYPE: ICD-10-CM

## 2023-03-28 DIAGNOSIS — E11.65 TYPE 2 DIABETES MELLITUS WITH HYPERGLYCEMIA, WITH LONG-TERM CURRENT USE OF INSULIN: Primary | Chronic | ICD-10-CM

## 2023-03-28 DIAGNOSIS — I10 PRIMARY HYPERTENSION: Chronic | ICD-10-CM

## 2023-03-28 DIAGNOSIS — Z79.4 TYPE 2 DIABETES MELLITUS WITH HYPERGLYCEMIA, WITH LONG-TERM CURRENT USE OF INSULIN: Primary | Chronic | ICD-10-CM

## 2023-03-28 DIAGNOSIS — M1A.9XX0 CHRONIC GOUT WITHOUT TOPHUS, UNSPECIFIED CAUSE, UNSPECIFIED SITE: Chronic | ICD-10-CM

## 2023-03-28 DIAGNOSIS — F33.0 MAJOR DEPRESSIVE DISORDER, RECURRENT EPISODE, MILD DEGREE: Chronic | ICD-10-CM

## 2023-03-28 DIAGNOSIS — H66.43 SUPPURATIVE OTITIS MEDIA OF BOTH EARS, UNSPECIFIED CHRONICITY: ICD-10-CM

## 2023-03-28 DIAGNOSIS — E53.8 VITAMIN B 12 DEFICIENCY: ICD-10-CM

## 2023-03-28 DIAGNOSIS — Z51.81 ENCOUNTER FOR MEDICATION MONITORING: ICD-10-CM

## 2023-03-28 DIAGNOSIS — D68.51 FACTOR 5 LEIDEN MUTATION, HETEROZYGOUS: Chronic | ICD-10-CM

## 2023-03-28 PROCEDURE — 85025 COMPLETE CBC W/AUTO DIFF WBC: CPT | Performed by: NURSE PRACTITIONER

## 2023-03-28 PROCEDURE — 84443 ASSAY THYROID STIM HORMONE: CPT | Performed by: NURSE PRACTITIONER

## 2023-03-28 PROCEDURE — 82306 VITAMIN D 25 HYDROXY: CPT | Performed by: NURSE PRACTITIONER

## 2023-03-28 PROCEDURE — 80053 COMPREHEN METABOLIC PANEL: CPT | Performed by: NURSE PRACTITIONER

## 2023-03-28 PROCEDURE — 83036 HEMOGLOBIN GLYCOSYLATED A1C: CPT | Performed by: NURSE PRACTITIONER

## 2023-03-28 PROCEDURE — 80061 LIPID PANEL: CPT | Performed by: NURSE PRACTITIONER

## 2023-03-28 PROCEDURE — 84550 ASSAY OF BLOOD/URIC ACID: CPT | Performed by: NURSE PRACTITIONER

## 2023-03-28 PROCEDURE — 82043 UR ALBUMIN QUANTITATIVE: CPT | Performed by: NURSE PRACTITIONER

## 2023-03-28 PROCEDURE — 82607 VITAMIN B-12: CPT | Performed by: NURSE PRACTITIONER

## 2023-03-28 RX ORDER — INSULIN LISPRO 100 [IU]/ML
INJECTION, SOLUTION INTRAVENOUS; SUBCUTANEOUS
Qty: 15 ML | Refills: 3 | Status: SHIPPED | OUTPATIENT
Start: 2023-03-28

## 2023-03-28 RX ORDER — OFLOXACIN 3 MG/ML
5 SOLUTION AURICULAR (OTIC) 2 TIMES DAILY
Qty: 10 ML | Refills: 0 | OUTPATIENT
Start: 2023-03-28 | End: 2023-04-04

## 2023-03-28 RX ORDER — ALLOPURINOL 100 MG/1
100 TABLET ORAL DAILY
Qty: 30 TABLET | Refills: 3 | Status: SHIPPED | OUTPATIENT
Start: 2023-03-28

## 2023-03-28 RX ORDER — LANCETS 33 GAUGE
1 EACH MISCELLANEOUS 3 TIMES DAILY
Qty: 100 EACH | Refills: 3 | Status: SHIPPED | OUTPATIENT
Start: 2023-03-28

## 2023-03-28 RX ORDER — AZELASTINE 1 MG/ML
SPRAY, METERED NASAL
Qty: 1 EACH | Refills: 3 | Status: SHIPPED | OUTPATIENT
Start: 2023-03-28

## 2023-03-28 RX ORDER — AMLODIPINE BESYLATE 10 MG/1
10 TABLET ORAL DAILY
Qty: 30 TABLET | Refills: 3 | Status: SHIPPED | OUTPATIENT
Start: 2023-03-28 | End: 2024-03-27

## 2023-03-28 RX ORDER — ALBUTEROL SULFATE 90 UG/1
2 AEROSOL, METERED RESPIRATORY (INHALATION) EVERY 4 HOURS PRN
Qty: 18 G | Refills: 3 | Status: SHIPPED | OUTPATIENT
Start: 2023-03-28

## 2023-03-28 RX ORDER — OFLOXACIN 3 MG/ML
1 SOLUTION/ DROPS OPHTHALMIC 4 TIMES DAILY
Qty: 5 ML | Refills: 0 | Status: SHIPPED | OUTPATIENT
Start: 2023-03-28 | End: 2023-03-28

## 2023-03-28 RX ORDER — MONTELUKAST SODIUM 10 MG/1
10 TABLET ORAL NIGHTLY
Qty: 30 TABLET | Refills: 3 | Status: SHIPPED | OUTPATIENT
Start: 2023-03-28

## 2023-03-28 RX ORDER — DULOXETIN HYDROCHLORIDE 30 MG/1
30 CAPSULE, DELAYED RELEASE ORAL 2 TIMES DAILY
Qty: 60 CAPSULE | Refills: 0 | Status: SHIPPED | OUTPATIENT
Start: 2023-03-28

## 2023-03-28 RX ORDER — INSULIN GLARGINE 100 [IU]/ML
INJECTION, SOLUTION SUBCUTANEOUS
Qty: 24 ML | Refills: 3 | Status: SHIPPED | OUTPATIENT
Start: 2023-03-28

## 2023-03-28 RX ORDER — DEXTROMETHORPHAN HYDROBROMIDE AND PROMETHAZINE HYDROCHLORIDE 15; 6.25 MG/5ML; MG/5ML
5 SYRUP ORAL 2 TIMES DAILY PRN
Qty: 150 ML | Refills: 0 | OUTPATIENT
Start: 2023-03-28 | End: 2023-04-07

## 2023-03-28 NOTE — TELEPHONE ENCOUNTER
Pharmacy Name: [place], [place] - [address] [facility] - [phone] [facility] [phone]      Pharmacy representative name: СЕРГЕЙ    Pharmacy representative phone number: [phone]    What medication are you calling in regards to: ofloxacin (Ocuflox) 0.3 % ophthalmic solution       What question does the pharmacy have: DOES NOT HAVE IN STOCK AND IS REQUESTING A CALL BACK WITH AN ALTERNATE MEDICATION    Who is the provider that prescribed the medication: BRADY KUNZ    Additional notes: PLEASE CALL AND ADVISE

## 2023-03-28 NOTE — PROGRESS NOTES
History of Present Illness  Natalia Arzate is a 37 y.o. female who presents to the clinic for follow-up.  She was hospitalized at [facility] from February 24 through March 10, 2023  due to a left Retroperitoneal abscess.  She was discharged with a PICC line and IV Ceftriaxone which she completed on March 21st. Natalia has been diagnosed with Factor V Leiden, DM, type 2 with neuropathy currently treated with insulin.  In addition, she has HTN, Dyslipidemia, Hypothyroidism, Renal Stones and Gout.     She was hospitalized at [facility] from 12/26/2021 until 2/15/2022.  She was admitted for a disseminated infection.  She presented emergently at [facility] on 12/26/2021 following a fall at home. She was transferred to . She was  diagnosed with L Pyelonephritis complicated by multiple abscesses as well as superinfection of L iliopsoas hematoma on admission at [facility]. During admission, she developed acute hypoxic respiratory failure presumed due to aspiration and septic shock. She was transferred to ICU. She was intubated and required CRRT/HD while in ICU. Eventually transferred to the floor no longer requiring HD. She does have Oxygen at her home which she uses intermittently when she develops shortness of air.  During hospitalization, she had an acute/subacute R cerebellar ischemic stroke on 1/26/2022 with recommendation to continue on Anticoagulation.     Diabetes  She has type 2 diabetes mellitus treated with insulin both basal and fast acting. She is using a DexCom which is helping her.  Risk factors for coronary artery disease include diabetes mellitus, dyslipidemia, hypertension, obesity, stress and sedentary lifestyle.     Lab Results   Component Value Date    HGBA1C 9 12/01/2022     Lab Results   Component Value Date    HGBA1C 8.9 12/01/2022     Lab Results   Component Value Date    HGBA1C 7.00 (H) 03/28/2023     Hypertension  Currently taking Amlodipine and Metoprolol.    Lab Results    Component Value Date    CREATININE 0.69 01/08/2023     Dyslipidemia  Previously prescribed Omega which she has not been taking.      Lab Results   Component Value Date    CHOL 157 12/01/2022    TRIG 243 (H) 12/01/2022    HDL 22 (L) 12/01/2022    LDL 93 12/01/2022     Hypothyroidism  Had previously been prescribed Levothyroxine but her last TSH was in range without medication  Lab Results   Component Value Date    TSH 3.890 12/01/2022     Kidney Disease  Previous Hydronephrosis and obstructing stone as well as nonobstructing renal stones.  She has had multiple procedures per urologist, Dr. Motley and  medical team.  Left nephrectomy per  medical urology team in October 2022.    Back Pain  Shares she continues to use her walker for support during ambulation.  She is now able to go upstairs to her home with some difficulty.    The following portions of the patient's history were reviewed and updated as appropriate: allergies, current medications, past family history, past medical history, past social history, past surgical history and problem list.    Review of Systems   Constitutional: Positive for fatigue. Negative for activity change, appetite change, chills, fever and unexpected weight change.   HENT: Positive for congestion and ear pain. Negative for ear discharge, postnasal drip, rhinorrhea, sinus pressure, sinus pain and voice change.    Eyes: Negative for pain, discharge, redness, itching and visual disturbance.   Respiratory: Positive for cough and wheezing (Mild). Negative for shortness of breath.    Cardiovascular: Positive for leg swelling (Intermittent). Negative for chest pain and palpitations.   Gastrointestinal: Negative for nausea and vomiting.   Endocrine: Negative for cold intolerance, heat intolerance, polydipsia, polyphagia and polyuria.   Musculoskeletal: Positive for arthralgias and gait problem.   Skin: Negative for color change and rash.   Allergic/Immunologic: Positive for environmental  "allergies.   Neurological: Positive for numbness (Left foot numbness secondary to CVA) and headaches. Negative for dizziness, tremors, speech difficulty, weakness and light-headedness.   Hematological: Negative for adenopathy.   Psychiatric/Behavioral: Negative for confusion, decreased concentration and suicidal ideas. The patient is not nervous/anxious.    All other systems reviewed and are negative.      Objective  Vital signs:  /94 (BP Location: Left arm, Patient Position: Sitting, Cuff Size: Adult)   Pulse 101   Temp 98.4 °F (36.9 °C) (Temporal)   Resp 14   Ht 162.6 cm (64\")   Wt 126 kg (278 lb)   LMP 10/31/2017   SpO2 99%   BMI 47.72 kg/m²     Physical Exam  Vitals and nursing note reviewed.   Constitutional:       General: She is not in acute distress.     Appearance: She is well-developed.      Comments: Sitting in a wheelchair.  Her daughter is with her.   HENT:      Head: Normocephalic.      Right Ear: A middle ear effusion is present. Tympanic membrane is erythematous. Tympanic membrane is not bulging.      Left Ear: A middle ear effusion is present. Tympanic membrane is erythematous. Tympanic membrane is not bulging.      Nose:      Right Sinus: No maxillary sinus tenderness or frontal sinus tenderness.      Left Sinus: No maxillary sinus tenderness or frontal sinus tenderness.      Mouth/Throat:      Mouth: Mucous membranes are moist.   Eyes:      General: No scleral icterus.        Right eye: No discharge.         Left eye: No discharge.      Conjunctiva/sclera: Conjunctivae normal.   Cardiovascular:      Rate and Rhythm: Normal rate and regular rhythm.      Heart sounds: Normal heart sounds. No murmur heard.    No friction rub.   Pulmonary:      Effort: Pulmonary effort is normal. No respiratory distress.      Breath sounds: No wheezing or rales.   Abdominal:      General: Bowel sounds are normal. There is no distension.      Palpations: Abdomen is soft.      Tenderness: There is no " abdominal tenderness. There is no guarding.   Musculoskeletal:         General: Tenderness present.      Cervical back: Neck supple.      Right lower leg: No edema.      Left lower leg: No edema.   Lymphadenopathy:      Cervical: No cervical adenopathy.   Skin:     General: Skin is warm and dry.      Capillary Refill: Capillary refill takes less than 2 seconds.      Findings: No erythema or rash.   Neurological:      Mental Status: She is alert and oriented to person, place, and time.      Gait: Gait abnormal.   Psychiatric:         Mood and Affect: Mood and affect normal.         Speech: Speech normal.         Behavior: Behavior normal.         Thought Content: Thought content normal.         Cognition and Memory: Cognition and memory normal.     Result Review :  Class 3 Severe Obesity (BMI >=40). Obesity-related health conditions include the following: hypertension, diabetes mellitus, dyslipidemias and GERD. Obesity is improving with lifestyle modifications. BMI  is above average; BMI management plan is completed. Provided information on  portion control and increasing exercise.     Assessment & Plan     Diagnoses and all orders for this visit:    1. Type 2 diabetes mellitus with hyperglycemia, with long-term current use of insulin ([facility]) (Primary)  Comments:  Findings and recommendations discussed with Natalia.  She will continue Lantus and Humalog.  Dexcom supplies ordered  Orders:  -     Comprehensive Metabolic Panel  -     Lipid Panel  -     TSH  -     Hemoglobin A1c  -     Vitamin D,25-Hydroxy  -     Vitamin B12  -     MicroAlbumin, Urine, Random - Urine, Clean Catch  -     Lancets Micro Thin 33G misc; 1 Device 3 (Three) Times a Day.  Dispense: 100 each; Refill: 3  -     Semaglutide, 2 MG/DOSE, 8 MG/3ML solution pen-injector; Inject 2 mg under the skin into the appropriate area as directed 1 (One) Time Per Week.  Dispense: 3 mL; Refill: 3  -     Insulin Glargine (Lantus SoloStar) 100 UNIT/ML injection pen;  Maximum daily dose of 75 units.  Dispense: 24 mL; Refill: 3  -     Insulin Lispro, 1 Unit Dial, (HUMALOG) 100 UNIT/ML solution pen-injector; Per sliding scale  Dispense: 15 mL; Refill: 3  -     glucose blood test strip; 1 each by Other route 3 (Three) Times a Day.  Dispense: 100 each; Refill: 5    2. Primary hypertension  Comments:  Continue Amlodipine and Metoprolol.  Will consider ARB/ACEI   Orders:  -     Comprehensive Metabolic Panel  -     Lipid Panel  -     TSH  -     amLODIPine (NORVASC) 10 MG tablet; Take 1 tablet by mouth Daily.  Dispense: 30 tablet; Refill: 3    3. Moderate asthma, unspecified whether complicated, unspecified whether persistent  Comments:  Continue Alb Inhaler prn, Singulair. Avoidance of triggers   Orders:  -     mometasone-formoterol (DULERA 200) 200-5 MCG/ACT inhaler; 2 puffs twice daily  Dispense: 1 g; Refill: 0  -     albuterol sulfate  (90 Base) MCG/ACT inhaler; Inhale 2 puffs Every 4 (Four) Hours As Needed for Wheezing.  Dispense: 18 g; Refill: 3  -     montelukast (SINGULAIR) 10 MG tablet; Take 1 tablet by mouth Every Night.  Dispense: 30 tablet; Refill: 3    4. Major depressive disorder, recurrent episode, mild degree (HCC)  Comments:  Responding well to Cymbalta.    Orders:  -     DULoxetine (CYMBALTA) 30 MG capsule; Take 1 capsule by mouth 2 (Two) Times a Day.  Dispense: 60 capsule; Refill: 0    5. Chronic gout without tophus, unspecified cause, unspecified site  Comments:  Continue allopurinol   Orders:  -     Uric Acid  -     allopurinol (ZYLOPRIM) 100 MG tablet; Take 1 tablet by mouth Daily.  Dispense: 30 tablet; Refill: 3    6. Factor 5 Leiden mutation, heterozygous (HCC)  Comments:  Continue Eliquis follow-up with hematologist  Orders:  -     CBC & Differential  -     apixaban (ELIQUIS) 5 MG tablet tablet; Take 1 tablet by mouth 2 (Two) Times a Day.  Dispense: 60 tablet; Refill: 3    7. Vitamin B 12 deficiency  Comments:  Continue vitamin B12  Orders:  -      cyanocobalamin ([employer] Vitamin B-12) 2000 MCG tablet; Take 1 tablet by mouth Daily.  Dispense: 30 tablet; Refill: 3    8. Suppurative otitis media of both ears, unspecified chronicity  Comments:  Floxin Otic drops and Astelin NS prescribed   Orders:  -     CBC & Differential  -     ofloxacin (Floxin Otic) 0.3 % otic solution; Administer 5 drops into both ears 2 (Two) Times a Day for 7 days.  Dispense: 10 mL; Refill: 0  -     azelastine (ASTELIN) 0.1 % nasal spray; 2 sprays both nostrils twice daily as needed  Dispense: 1 each; Refill: 3    9. Vitamin D deficiency  Comments:  Continue weekly Vit D   Orders:  -     Vitamin D,25-Hydroxy    10. Cough, unspecified type  Comments:  Promethazine DM refilled  Orders:  -     promethazine-dextromethorphan (PROMETHAZINE-DM) 6.25-15 MG/5ML syrup; Take 5 mL by mouth 2 (Two) Times a Day As Needed for Cough.  Dispense: 150 mL; Refill: 0    11. Encounter for medication monitoring  Comments:  UDS today  Orders:  -     Urine Drug Screen - Urine, Clean Catch; Future      Follow Up In April   Findings and recommendations discussed with Natalia. Reviewed treatment options. Lifestyle modifications reinforced including nutrition and activity recommendations. Natalia  will follow up in April  sooner if problems/concerns occur.  She was given instructions and counseling regarding his condition or for health maintenance advice. Please see specific information pulled into the AVS if appropriate    As part of the patient's treatment plan they are being prescribed a controlled substance/ substances with potential for abuse.  This patient has been made aware of the appropriate use of such medications, including potential risk of somnolence, limited ability to drive and/or work safely, and potential for overdose.  It has also been made clear these medications are for use by the patient only, without concomitant use of alcohol or other substances unless prescribed/advised by medical  provider.    Patient has completed prescribing agreement detailing terms of continued prescribing of controlled substances including monitoring PRANAY reports, urine drug screens, and pill counts.  The patient is aware PRANAY reports are reviewed on a regular basis and scanned into the chart.    History and physical exam exhibit continued safe and appropriate use of controlled substances. UDS completed today  PRANAY Patient Controlled Substance Report (from 3/31/2022 to 3/28/2023)    Dispensed  Strength Quantity Days Supply Provider Pharmacy   03/10/2023 Gabapentin 400MG 30 each 30 [facility]   03/10/2023 [facility]   [facility]   01/05/2023 Gabapentin 300MG 30 each 30 [facility]   12/01/2022 Gabapentin 300MG 30 each 30 [facility]   10/22/2022 Hydromorphone Hcl 4MG 12 [facility]   10/14/2022 [facility]ap[facility] 300MG 60 [facility]   09/20/2022 Hydrocodone Bitartrate/Ac 325MG/5MG 36 [facility]   09/07/2022 Gabapentin 300MG 60 each 30 [facility]   09/04/2022 Oxycodone/Acetaminophen 325MG/5MG 30 each 5 [facility]   07/28/2022 Gabapentin 300MG 60 each 30 [facility]   07/28/2022 Hydrocodone Bitartrate/Ac 325MG/5MG 10 each 2 JUSTIN BROWN [facility]   07/23/2022 Hydrocodone Bitartrate/Ac 325MG/5MG 8 each 2 DUONG VALLADARES [facility]   07/07/2022 Oxycodone/Acetaminophen 325MG/10MG 8 each 2 BRITTANY ELIZONDO [facility]   06/29/2022 Oxycodone/Acetaminophen 325MG/10MG 12 each 2 [facility] ELIZON[facility] Pharmacy, [facility]   06/25/2022 Gabapentin 300MG 60 each 30 [facility]   06/24/2022 Hydrocodone Bitartrate/Ac 325MG/5MG 5 each 1 KAE CHANG [facility]   05/25/2022 Gabapentin 300MG 60 each 30 PATRICE KUNZ [facility]   05/25/2022 Oxycodone/Acetaminophen 325MG/10MG 12 each 2 BRITTANY ELIZONDO [facility]   05/09/2022 Oxycodone/Acetaminophen 325MG/7.5MG 12 each 2 CURD,IGOR [facility]   04/27/2022 Oxycodone/Acetaminophen 325MG/10MG 12 [facility]   04/22/2022 Gabapentin 300MG 60 each 30 PATRICE KUNZ [facility]   04/19/2022 Oxycodone/Acetaminophen 325MG/10MG 20 each 5 [facility]   04/08/2022 Hydrocodone Bitartrate/Ac 325MG/5MG 10 each 2 SHAMEKA TAYLOR [facility]          This document has been electronically signed by:

## 2023-03-29 LAB
25(OH)D3 SERPL-MCNC: 33.5 NG/ML (ref 30–100)
ALBUMIN SERPL-MCNC: 4.4 G/DL (ref 3.5–5.2)
ALBUMIN UR-MCNC: 23.9 MG/DL
ALBUMIN/GLOB SERPL: 1.1 G/DL
ALP SERPL-CCNC: 132 U/L (ref 39–117)
ALT SERPL W P-5'-P-CCNC: 19 U/L (ref 1–33)
ANION GAP SERPL CALCULATED.3IONS-SCNC: 11 MMOL/L (ref 5–15)
AST SERPL-CCNC: 23 U/L (ref 1–32)
BASOPHILS # BLD AUTO: 0.06 10*3/MM3 (ref 0–0.2)
BASOPHILS NFR BLD AUTO: 0.9 % (ref 0–1.5)
BILIRUB SERPL-MCNC: 0.5 MG/DL (ref 0–1.2)
BUN SERPL-MCNC: 19 MG/DL (ref 6–20)
BUN/CREAT SERPL: 27.5 (ref 7–25)
CALCIUM SPEC-SCNC: 9.7 MG/DL (ref 8.6–10.5)
CHLORIDE SERPL-SCNC: 99 MMOL/L (ref 98–107)
CHOLEST SERPL-MCNC: 260 MG/DL (ref 0–200)
CO2 SERPL-SCNC: 21 MMOL/L (ref 22–29)
CREAT SERPL-MCNC: 0.69 MG/DL (ref 0.57–1)
DEPRECATED RDW RBC AUTO: 69.3 FL (ref 37–54)
EGFRCR SERPLBLD CKD-EPI 2021: 115.5 ML/MIN/1.73
EOSINOPHIL # BLD AUTO: 0.55 10*3/MM3 (ref 0–0.4)
EOSINOPHIL NFR BLD AUTO: 8.7 % (ref 0.3–6.2)
ERYTHROCYTE [DISTWIDTH] IN BLOOD BY AUTOMATED COUNT: 21.7 % (ref 12.3–15.4)
GLOBULIN UR ELPH-MCNC: 3.9 GM/DL
GLUCOSE SERPL-MCNC: 165 MG/DL (ref 65–99)
HBA1C MFR BLD: 7 % (ref 4.8–5.6)
HCT VFR BLD AUTO: 36.1 % (ref 34–46.6)
HDLC SERPL-MCNC: 40 MG/DL (ref 40–60)
HGB BLD-MCNC: 11.7 G/DL (ref 12–15.9)
IMM GRANULOCYTES # BLD AUTO: 0.01 10*3/MM3 (ref 0–0.05)
IMM GRANULOCYTES NFR BLD AUTO: 0.2 % (ref 0–0.5)
LDLC SERPL CALC-MCNC: 126 MG/DL (ref 0–100)
LDLC/HDLC SERPL: 2.89 {RATIO}
LYMPHOCYTES # BLD AUTO: 1.92 10*3/MM3 (ref 0.7–3.1)
LYMPHOCYTES NFR BLD AUTO: 30.3 % (ref 19.6–45.3)
MCH RBC QN AUTO: 28.6 PG (ref 26.6–33)
MCHC RBC AUTO-ENTMCNC: 32.4 G/DL (ref 31.5–35.7)
MCV RBC AUTO: 88.3 FL (ref 79–97)
MONOCYTES # BLD AUTO: 0.55 10*3/MM3 (ref 0.1–0.9)
MONOCYTES NFR BLD AUTO: 8.7 % (ref 5–12)
NEUTROPHILS NFR BLD AUTO: 3.24 10*3/MM3 (ref 1.7–7)
NEUTROPHILS NFR BLD AUTO: 51.2 % (ref 42.7–76)
NRBC BLD AUTO-RTO: 0 /100 WBC (ref 0–0.2)
PLATELET # BLD AUTO: 286 10*3/MM3 (ref 140–450)
PMV BLD AUTO: 9.2 FL (ref 6–12)
POTASSIUM SERPL-SCNC: 4.4 MMOL/L (ref 3.5–5.2)
PROT SERPL-MCNC: 8.3 G/DL (ref 6–8.5)
RBC # BLD AUTO: 4.09 10*6/MM3 (ref 3.77–5.28)
SODIUM SERPL-SCNC: 131 MMOL/L (ref 136–145)
TRIGL SERPL-MCNC: 522 MG/DL (ref 0–150)
TSH SERPL DL<=0.05 MIU/L-ACNC: 4.23 UIU/ML (ref 0.27–4.2)
URATE SERPL-MCNC: 5 MG/DL (ref 2.4–5.7)
VIT B12 BLD-MCNC: 702 PG/ML (ref 211–946)
VLDLC SERPL-MCNC: 94 MG/DL (ref 5–40)
WBC NRBC COR # BLD: 6.33 10*3/MM3 (ref 3.4–10.8)

## 2023-03-31 DIAGNOSIS — G43.809 OTHER MIGRAINE WITHOUT STATUS MIGRAINOSUS, NOT INTRACTABLE: Primary | ICD-10-CM

## 2023-04-03 ENCOUNTER — HOSPITAL ENCOUNTER (OUTPATIENT)
Dept: GENERAL RADIOLOGY | Facility: HOSPITAL | Age: 38
Discharge: HOME | End: 2023-04-03
Payer: COMMERCIAL

## 2023-04-03 ENCOUNTER — OFFICE VISIT (OUTPATIENT)
Dept: UROLOGY | Facility: CLINIC | Age: 38
End: 2023-04-03
Payer: COMMERCIAL

## 2023-04-03 ENCOUNTER — LAB (OUTPATIENT)
Dept: LAB | Facility: HOSPITAL | Age: 38
End: 2023-04-03
Payer: COMMERCIAL

## 2023-04-03 ENCOUNTER — TELEPHONE (OUTPATIENT)
Dept: UROLOGY | Facility: CLINIC | Age: 38
End: 2023-04-03

## 2023-04-03 VITALS
BODY MASS INDEX: 47.42 KG/M2 | WEIGHT: 277.78 LBS | SYSTOLIC BLOOD PRESSURE: 150 MMHG | HEIGHT: 64 IN | DIASTOLIC BLOOD PRESSURE: 93 MMHG

## 2023-04-03 DIAGNOSIS — Z51.81 ENCOUNTER FOR MEDICATION MONITORING: ICD-10-CM

## 2023-04-03 DIAGNOSIS — N20.0 KIDNEY STONE: Primary | ICD-10-CM

## 2023-04-03 DIAGNOSIS — N20.0 KIDNEY STONE: ICD-10-CM

## 2023-04-03 PROCEDURE — 99213 OFFICE O/P EST LOW 20 MIN: CPT | Performed by: UROLOGY

## 2023-04-03 PROCEDURE — 74018 RADEX ABDOMEN 1 VIEW: CPT

## 2023-04-03 PROCEDURE — 1160F RVW MEDS BY RX/DR IN RCRD: CPT | Performed by: UROLOGY

## 2023-04-03 PROCEDURE — 1159F MED LIST DOCD IN RCRD: CPT | Performed by: UROLOGY

## 2023-04-03 PROCEDURE — 3077F SYST BP >= 140 MM HG: CPT | Performed by: UROLOGY

## 2023-04-03 PROCEDURE — 3080F DIAST BP >= 90 MM HG: CPT | Performed by: UROLOGY

## 2023-04-03 PROCEDURE — 74018 RADEX ABDOMEN 1 VIEW: CPT | Performed by: RADIOLOGY

## 2023-04-03 RX ORDER — OXYCODONE AND ACETAMINOPHEN 10; 325 MG/1; MG/1
1 TABLET ORAL EVERY 6 HOURS PRN
Qty: 20 TABLET | Refills: 0 | Status: SHIPPED | OUTPATIENT
Start: 2023-04-03 | End: 2023-04-03

## 2023-04-03 NOTE — TELEPHONE ENCOUNTER
Patient called in. [facility] pharmacy did not receive prescription (shows transmission failed in our system)

## 2023-04-03 NOTE — PROGRESS NOTES
Chief Complaint:      Chief Complaint   Patient presents with   • Flank Pain       HPI:   38 y.o. female She was hospitalized at [facility] from February 24 through March 10, 2023  due to a left Retroperitoneal abscess.  She was discharged with a PICC line and IV Ceftriaxone which she completed on March 21st. Natalia has been diagnosed with Factor V Leiden, DM, type 2 with neuropathy currently treated with insulin.  In addition, she has HTN, Dyslipidemia, Hypothyroidism, Renal Stones and Gout.     She was hospitalized at [facility] from 12/26/2021 until 2/15/2022.  She was admitted for a disseminated infection.  She presented emergently at [facility] on 12/26/2021 following a fall at home. She was transferred to . She was  diagnosed with L Pyelonephritis complicated by multiple abscesses as well as superinfection of L iliopsoas hematoma on admission at [facility]. During admission, she developed acute hypoxic respiratory failure presumed due to aspiration and septic shock. She was transferred to ICU. She was intubated and required CRRT/HD while in ICU. Eventually transferred to the floor no longer requiring HD. She does have Oxygen at her home which she uses intermittently when she develops shortness of air.  During hospitalization, she had an acute/subacute R cerebellar ischemic stroke on 1/26/2022 with recommendation to continue on Anticoagulation.   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 Lord did a left nephrectomy for a damaged kidney with less than 10% function she was then told by Dr. Rojas she has a 4 mm right renal stone with a solitary kidney KUB today failed to disclose that there is no obstruction she requires pain medication.  I told her I would not be able to continue this.  And I will see her back in 3 months.  I strongly recommended she avoid anything that causes stones    Past  Medical History:     Past Medical History:   Diagnosis Date   • A-fib    • Abnormal ECG    • Anemia    • Anxiety    • Asthma    • Cancer     Ovarian   • Depression    • Diabetes mellitus    • DVT (deep venous thrombosis)    • Factor 5 Leiden mutation, heterozygous    • Fibroid    • GERD (gastroesophageal reflux disease)    • Gout    • H/O abdominal abscess    • History of sepsis    • History of transfusion    • Hyperlipidemia    • Hypothyroid    • Kidney stone    • Migraines    • Neuropathy    • Ovarian cancer 02/2021   • Ovarian cyst    • PE (pulmonary embolism)    • Polycystic ovary syndrome    • Preeclampsia    • Rh incompatibility    • Stroke    • TIA (transient ischemic attack)    • Urinary tract infection    • Varicella        Current Meds:     Current Outpatient Medications   Medication Sig Dispense Refill   • albuterol (PROVENTIL) (2.5 MG/3ML) 0.083% nebulizer solution Take 2.5 mg by nebulization Every 6 (Six) Hours As Needed for Wheezing or Shortness of Air. 150 mL 3   • albuterol sulfate  (90 Base) MCG/ACT inhaler Inhale 2 puffs Every 4 (Four) Hours As Needed for Wheezing. 18 g 3   • allopurinol (ZYLOPRIM) 100 MG tablet Take 1 tablet by mouth Daily. 30 tablet 3   • amLODIPine (NORVASC) 10 MG tablet Take 1 tablet by mouth Daily. 30 tablet 3   • apixaban (ELIQUIS) 5 MG tablet tablet Take 1 tablet by mouth 2 (Two) Times a Day. 60 tablet 3   • azelastine (ASTELIN) 0.1 % nasal spray 2 sprays both nostrils twice daily as needed 1 each 3   • Continuous Blood Gluc Sensor (Dexcom G6 Sensor) Every 10 (Ten) Days. 9 each 3   • cyanocobalamin ([employer] Vitamin B-12) 2000 MCG tablet Take 1 tablet by mouth Daily. 30 tablet 3   • DULoxetine (CYMBALTA) 30 MG capsule Take 1 capsule by mouth 2 (Two) Times a Day. 60 capsule 0   • gabapentin (NEURONTIN) 300 MG capsule Take 1 capsule by mouth At Night As Needed (neuropathy) for up to 30 days. 30 capsule 0   • glucose blood test strip 1 each by Other route 3 (Three) Times a  Day. 100 each 5   • Insulin Glargine (Lantus SoloStar) 100 UNIT/ML injection pen Maximum daily dose of 75 units. 24 mL 3   • Insulin Lispro, 1 Unit Dial, (HUMALOG) 100 UNIT/ML solution pen-injector Per sliding scale 15 mL 3   • Insulin Pen Needle 30G X 8 MM misc 1 Device 4 (Four) Times a Day. 200 each 2   • ipratropium-albuterol (DUO-NEB) 0.5-2.5 mg/3 ml nebulizer Take 3 mL by nebulization Every 4 (Four) Hours As Needed for Shortness of Air. 150 mL 2   • Lancets Micro Thin 33G misc 1 Device 3 (Three) Times a Day. 100 each 3   • metoprolol succinate XL (TOPROL-XL) 50 MG 24 hr tablet Take 1 tablet by mouth Daily. 30 tablet 3   • mometasone-formoterol (DULERA 200) 200-5 MCG/ACT inhaler 2 puffs twice daily 1 g 0   • montelukast (SINGULAIR) 10 MG tablet Take 1 tablet by mouth Every Night. 30 tablet 3   • nystatin (MYCOSTATIN) 061362 UNIT/GM powder Apply  topically to the appropriate area as directed 3 (Three) Times a Day. 60 g 1   • O2 (OXYGEN) Inhale 2 L/min 1 (One) Time. (Patient not taking: Reported on 3/28/2023)     • ofloxacin (Floxin Otic) 0.3 % otic solution Administer 5 drops into both ears 2 (Two) Times a Day for 7 days. 10 mL 0   • ondansetron ODT (ZOFRAN-ODT) 4 MG disintegrating tablet Place 1 tablet on the tongue Every 6 (Six) Hours As Needed for Nausea or Vomiting. 12 tablet 0   • polyethylene glycol (MiraLax) 17 g packet Take 17 g by mouth Daily. 30 each 3   • promethazine (PHENERGAN) 25 MG tablet Take 1 tablet by mouth Every 6 (Six) Hours As Needed for Nausea or Vomiting. 21 tablet 2   • promethazine-dextromethorphan (PROMETHAZINE-DM) 6.25-15 MG/5ML syrup Take 5 mL by mouth 2 (Two) Times a Day As Needed for Cough. 150 mL 0   • Respiratory Therapy Supplies (Nebulizer/Tubing/Mouthpiece) kit 1 each Take As Directed. 1 each 1   • Semaglutide, 2 MG/DOSE, 8 MG/3ML solution pen-injector Inject 2 mg under the skin into the appropriate area as directed 1 (One) Time Per Week. 3 mL 3   • ubrogepant (Ubrelvy) 100 MG  tablet Take 1 tablet by mouth 1 (One) Time As Needed (Migraine) for up to 1 dose. 4 tablet 0   • vitamin D (ERGOCALCIFEROL) 1.25 MG (05823 UT) capsule capsule Take 1 capsule by mouth 1 (One) Time Per Week. Prior to [facility] Admission, Patient was on:  takes on saturday 5 capsule 3     No current facility-administered medications for this visit.        Allergies:      Allergies   Allergen Reactions   • Toradol [Ketorolac Tromethamine] Anaphylaxis and Hives   • Haldol [Haloperidol] Hives and Mental Status Change   • Tramadol Hives and Swelling   • Amoxicillin Hives and Rash   • Penicillins Hives and Rash        Past Surgical History:     Past Surgical History:   Procedure Laterality Date   • ABDOMINAL SURGERY     • CARDIAC CATHETERIZATION     •  SECTION     • CHOLECYSTECTOMY     • COLONOSCOPY     • ENDOSCOPY     • EXTRACORPOREAL SHOCK WAVE LITHOTRIPSY (ESWL) Left 10/22/2021    Procedure: EXTRACORPOREAL SHOCKWAVE LITHOTRIPSY;  Surgeon: Milan Motley MD;  Location: [facility];  Service: Urology;  Laterality: Left;   • LITHOTRIPSY     • RIGHT OOPHORECTOMY     • URETEROSCOPY LASER LITHOTRIPSY WITH STENT INSERTION Left 10/01/2021    Procedure: URETEROSCOPY WITH STENT PLACEMENT;  Surgeon: Milan Motley MD;  Location: [facility];  Service: Urology;  Laterality: Left;   • URETEROSCOPY LASER LITHOTRIPSY WITH STENT INSERTION Left 2022    Procedure: URETEROSCOPY STENT REMOVAL;  Surgeon: Milan Motley MD;  Location: [facility];  Service: Urology;  Laterality: Left;   • URETEROSCOPY LASER LITHOTRIPSY WITH STENT INSERTION Left 2022    Procedure: CYSTOSCOPY RETROGRADE LEFT WITH STENT PLACEMENT;  Surgeon: Milan Motley MD;  Location: [facility];  Service: Urology;  Laterality: Left;       Social History:     Social History     Socioeconomic History   • Marital status:    Tobacco Use   • Smoking status: Never     Passive exposure: Never   • Smokeless tobacco: Never   Vaping  "Use   • Vaping Use: Never used   Substance and Sexual Activity   • Alcohol use: No   • Drug use: Never     Comment: Pt has track marks on right arm. Pt states \"It's from my INR being drawn.\"    • Sexual activity: Not Currently     Partners: Male     Birth control/protection: None       Family History:     Family History   Problem Relation Age of Onset   • Hypertension Mother    • Diabetes Father    • Hypertension Father    • Heart attack Father    • No Known Problems Sister    • No Known Problems Brother    • No Known Problems Son    • No Known Problems Daughter    • No Known Problems Maternal Grandmother    • No Known Problems Maternal Grandfather    • No Known Problems Paternal Grandmother    • No Known Problems Paternal Grandfather    • No Known Problems Cousin    • Rheum arthritis Neg Hx    • Osteoarthritis Neg Hx    • Asthma Neg Hx    • Heart failure Neg Hx    • Hyperlipidemia Neg Hx    • Migraines Neg Hx    • Rashes / Skin problems Neg Hx    • Seizures Neg Hx    • Stroke Neg Hx    • Thyroid disease Neg Hx        Review of Systems:     Review of Systems   Constitutional: Negative.  Negative for activity change, appetite change, chills, diaphoresis, fatigue and unexpected weight change.   HENT: Negative for congestion, dental problem, drooling, ear discharge, ear pain, facial swelling, hearing loss, mouth sores, nosebleeds, postnasal drip, rhinorrhea, sinus pressure, sneezing, sore throat, tinnitus, trouble swallowing and voice change.    Eyes: Negative.  Negative for photophobia, pain, discharge, redness, itching and visual disturbance.   Respiratory: Negative.  Negative for apnea, cough, choking, chest tightness, shortness of breath, wheezing and stridor.    Cardiovascular: Negative.  Negative for chest pain, palpitations and leg swelling.   Gastrointestinal: Negative.  Negative for abdominal distention, abdominal pain, anal bleeding, blood in stool, constipation, diarrhea, nausea, rectal pain and vomiting. "   Endocrine: Negative.  Negative for cold intolerance, heat intolerance, polydipsia, polyphagia and polyuria.   Musculoskeletal: Negative.  Negative for arthralgias, back pain, gait problem, joint swelling, myalgias, neck pain and neck stiffness.   Skin: Negative.  Negative for color change, pallor, rash and wound.   Allergic/Immunologic: Negative.  Negative for environmental allergies, food allergies and immunocompromised state.   Neurological: Negative.  Negative for dizziness, tremors, seizures, syncope, facial asymmetry, speech difficulty, weakness, light-headedness, numbness and headaches.   Hematological: Negative.  Negative for adenopathy. Does not bruise/bleed easily.   Psychiatric/Behavioral: Negative for agitation, behavioral problems, confusion, decreased concentration, dysphoric mood, hallucinations, self-injury, sleep disturbance and suicidal ideas. The patient is not nervous/anxious and is not hyperactive.    All other systems reviewed and are negative.      Physical Exam:     Physical Exam  Constitutional:       Appearance: She is well-developed.   HENT:      Head: Normocephalic and atraumatic.      Right Ear: External ear normal.      Left Ear: External ear normal.   Eyes:      Conjunctiva/sclera: Conjunctivae normal.      Pupils: Pupils are equal, round, and reactive to light.   Cardiovascular:      Rate and Rhythm: Normal rate and regular rhythm.      Heart sounds: Normal heart sounds.   Pulmonary:      Effort: Pulmonary effort is normal.      Breath sounds: Normal breath sounds.   Abdominal:      General: Bowel sounds are normal. There is no distension.      Palpations: Abdomen is soft. There is no mass.      Tenderness: There is no abdominal tenderness. There is no guarding or rebound.   Genitourinary:     Vagina: No vaginal discharge.      Comments: Super morbidly obese and wheelchair-bound  Musculoskeletal:         General: Normal range of motion.   Skin:     General: Skin is warm and dry.    Neurological:      Mental Status: She is alert.      Deep Tendon Reflexes: Reflexes are normal and symmetric.   Psychiatric:         Behavior: Behavior normal.         Thought Content: Thought content normal.         Judgment: Judgment normal.         I have reviewed the following portions of the patient's history: Allergies, current medications, past family history, past medical history, past social history, past surgical history, problem list, and ROS and confirm it is accurate.    Recent Image (CT and/or KUB):      CT Abdomen and Pelvis: Results for orders placed during the hospital encounter of 08/19/22    CT Abdomen Pelvis With & Without Contrast    Narrative  EXAM:  CT Abdomen and Pelvis Without and With Intravenous Contrast    EXAM DATE:  8/19/2022 11:34 AM    CLINICAL HISTORY:  right flank pain    TECHNIQUE:  Axial computed tomography images of the abdomen and pelvis without and  with intravenous contrast.  Sagittal and coronal reformatted images were  created and reviewed.  This CT exam was performed using one or more of  the following dose reduction techniques:  automated exposure control,  adjustment of the mA and/or kV according to patient size, and/or use of  iterative reconstruction technique.    COMPARISON:  08/01/2022    FINDINGS:  Lung bases:  The lung bases are clear.    ABDOMEN:  Liver:  Marked diffuse fatty infiltration of liver with hepatomegaly.  Gallbladder and bile ducts:  Cholecystectomy clips are present.  No  ductal dilation.  Pancreas:  Unremarkable.  No mass.  No ductal dilation.  Spleen:  Spleen is unenlarged.  Adrenals:  The adrenals are unremarkable.  Kidneys and ureters:  Left ureteral stent is again noted with  decompression of the collecting system. No hydronephrosis.  Small  nonobstructing right kidney stones.  No left-sided kidney stones  identified.  No solid enhancing renal masses.  Stable appearance of left  kidney with area of high density adjacent to the left psoas  muscle.  Stomach and bowel:  Unremarkable.  No obstruction.  No mucosal  thickening.    PELVIS:  Appendix:  No findings to suggest acute appendicitis.  Bladder:  Unremarkable.  No mass.  No stones.  Reproductive:  Unremarkable as visualized.    ABDOMEN and PELVIS:  Intraperitoneal space:  No pelvic free fluid.  No free air.  Bones/joints:  Stable appearance of the bony structures.  No acute  fracture.  No dislocation.  Soft tissues:  Unremarkable.  Vasculature:  Unremarkable.  No abdominal aortic aneurysm.  Lymph nodes:  No iliac or retroperitoneal lymphadenopathy.    Impression  1.  Nonobstructing right kidney stones. No hydronephrosis.  2.  Stable appearance and positioning of left ureteral stent. No  left-sided hydronephrosis.  3.  Stable diffuse fatty infiltration of liver with hepatomegaly.  4.  Other incidental and nonacute findings as above.    This report was finalized on 8/19/2022 12:11 PM by Dr. Spencer Hightower MD.       CT Stone Protocol: Results for orders placed during the hospital encounter of 01/01/23    CT Abdomen Pelvis Stone Protocol    Narrative  CT ABDOMEN AND PELVIS, NONCONTRAST, 1/1/2023    HISTORY:  37-year-old female status post left nephrectomy for chronic nonfunctioning kidney at [facility] on 10/18/2022. Subsequent CT studies here showing left flank fluid collection. She presents to the ED with right flank pain.    TECHNIQUE:  CT imaging of the abdomen and pelvis, noncontrast kidney stone protocol. Radiation dose reduction techniques included automated exposure control. Radiation audit for CT and nuclear cardiology exams here in the last 12 months: 21.    COMPARISON:  *  CT abdomen/pelvis, 12/20/2022 and 12/8/2022.    ABDOMEN FINDINGS:  Or two tiny nonobstructing calculi within a right lower pole renal calyx, unchanged. Right kidney, right ureter and urinary bladder are otherwise unremarkable. There is no evidence of urinary obstruction on the right.    Postop changes left  nephrectomy. There is a large thick-walled fluid collection in the left flank behind the pericolic gutter extending from the level of the nephrectomy bed the iliac crest. This collection is unchanged since the most recent prior study  but has enlarged since 12/9/2022 as previously described. Given enlargement, infected fluid collection is possible, clinical and laboratory correlation is recommended.    Marked hepatomegaly with moderate diffuse hepatic steatosis. Mild splenomegaly. Cholecystectomy. No bile duct dilatation.    Small bowel and colon are normal in caliber and appearance, as imaged. The appendix is not seen. No gastric distention. Normal caliber abdominal aorta.    PELVIS FINDINGS:  Uterus, adnexal regions, nondistended urinary bladder and rectum are unremarkable. No free pelvic fluid.    Lung base images show no active disease.    Impression  1.  No change since 12/28/2022.  2.  Tiny nonobstructing right renal calculi. No evidence of ureteral stone or right urinary obstruction.  3.  Postop left nephrectomy. Thick-walled left flank fluid collection is again noted. Clinical and laboratory correlation is recommended to exclude infected collection.  4.  Hepatomegaly and diffuse hepatic steatosis.    Signer Name: Esteban Banegas MD  Signed: 1/1/2023 7:02 AM  Workstation Name: [email or web address]  [facility]       KUB: Results for orders placed during the hospital encounter of 01/08/23    XR Abdomen KUB    Narrative  CR Abdomen 1 Vw    INDICATION:  Right flank pain    COMPARISON:  None available    FINDINGS:  AP radiograph(s) of the abdomen. The renal shadows are symmetric. No renal calculi. No bladder calculi.    The bowel gas pattern is nonobstructive. No acute osseous abnormalities. There are surgical clips in the abdomen    Impression  Negative KUB.. No renal stones are visible    Signer Name: Gerardo Jorgensen MD  Signed: 1/8/2023 7:28 AM  Workstation Name: [id number]  [facility]       Labs (past 3 months):      Office Visit on 03/28/2023   Component Date Value Ref Range Status   • Glucose 03/28/2023 165 (H)  65 - 99 mg/dL Final   • BUN 03/28/2023 19  6 - 20 mg/dL Final   • Creatinine 03/28/2023 0.69  0.57 - 1.00 mg/dL Final   • Sodium 03/28/2023 131 (L)  136 - 145 mmol/L Final   • Potassium 03/28/2023 4.4  3.5 - 5.2 mmol/L Final   • Chloride 03/28/2023 99  98 - 107 mmol/L Final   • CO2 03/28/2023 21.0 (L)  22.0 - 29.0 mmol/L Final   • Calcium 03/28/2023 9.7  8.6 - 10.5 mg/dL Final   • Total Protein 03/28/2023 8.3  6.0 - 8.5 g/dL Final   • Albumin 03/28/2023 4.4  3.5 - 5.2 g/dL Final   • ALT (SGPT) 03/28/2023 19  1 - 33 U/L Final   • AST (SGOT) 03/28/2023 23  1 - 32 U/L Final   • Alkaline Phosphatase 03/28/2023 132 (H)  39 - 117 U/L Final   • Total Bilirubin 03/28/2023 0.5  0.0 - 1.2 mg/dL Final   • Globulin 03/28/2023 3.9  gm/dL Final   • A/G Ratio 03/28/2023 1.1  g/dL Final   • BUN/Creatinine Ratio 03/28/2023 27.5 (H)  7.0 - 25.0 Final   • Anion Gap 03/28/2023 11.0  5.0 - 15.0 mmol/L Final   • eGFR 03/28/2023 115.5  >60.0 mL/min/1.73 Final   • Total Cholesterol 03/28/2023 260 (H)  0 - 200 mg/dL Final   • Triglycerides 03/28/2023 522 (H)  0 - 150 mg/dL Final   • HDL Cholesterol 03/28/2023 40  40 - 60 mg/dL Final   • LDL Cholesterol  03/28/2023 126 (H)  0 - 100 mg/dL Final   • VLDL Cholesterol 03/28/2023 94 (H)  5 - 40 mg/dL Final   • LDL/HDL Ratio 03/28/2023 2.89   Final   • TSH 03/28/2023 4.230 (H)  0.270 - 4.200 uIU/mL Final   • Hemoglobin A1C 03/28/2023 7.00 (H)  4.80 - 5.60 % Final   • 25 Hydroxy, Vitamin D 03/28/2023 33.5  30.0 - 100.0 ng/ml Final   • Uric Acid 03/28/2023 5.0  2.4 - 5.7 mg/dL Final   • Vitamin B-12 03/28/2023 702  211 - 946 pg/mL Final   • WBC 03/28/2023 6.33  3.40 - 10.80 10*3/mm3 Final   • RBC 03/28/2023 4.09  3.77 - 5.28 10*6/mm3 Final   • Hemoglobin 03/28/2023 11.7 (L)  12.0 - 15.9 g/dL Final   • Hematocrit 03/28/2023 36.1  34.0 - 46.6 %  Final   • MCV 03/28/2023 88.3  79.0 - 97.0 fL Final   • MCH 03/28/2023 28.6  26.6 - 33.0 pg Final   • MCHC 03/28/2023 32.4  31.5 - 35.7 g/dL Final   • RDW 03/28/2023 21.7 (H)  12.3 - 15.4 % Final   • RDW-SD 03/28/2023 69.3 (H)  37.0 - 54.0 fl Final   • MPV 03/28/2023 9.2  6.0 - 12.0 fL Final   • Platelets 03/28/2023 286  140 - 450 10*3/mm3 Final   • Neutrophil % 03/28/2023 51.2  42.7 - 76.0 % Final   • Lymphocyte % 03/28/2023 30.3  19.6 - 45.3 % Final   • Monocyte % 03/28/2023 8.7  5.0 - 12.0 % Final   • Eosinophil % 03/28/2023 8.7 (H)  0.3 - 6.2 % Final   • Basophil % 03/28/2023 0.9  0.0 - 1.5 % Final   • Immature Grans % 03/28/2023 0.2  0.0 - 0.5 % Final   • Neutrophils, Absolute 03/28/2023 3.24  1.70 - 7.00 10*3/mm3 Final   • Lymphocytes, Absolute 03/28/2023 1.92  0.70 - 3.10 10*3/mm3 Final   • Monocytes, Absolute 03/28/2023 0.55  0.10 - 0.90 10*3/mm3 Final   • Eosinophils, Absolute 03/28/2023 0.55 (H)  0.00 - 0.40 10*3/mm3 Final   • Basophils, Absolute 03/28/2023 0.06  0.00 - 0.20 10*3/mm3 Final   • Immature Grans, Absolute 03/28/2023 0.01  0.00 - 0.05 10*3/mm3 Final   • nRBC 03/28/2023 0.0  0.0 - 0.2 /100 WBC Final   • Microalbumin, Urine 03/28/2023 23.9  mg/dL Final   Admission on 03/21/2023, Discharged on 03/21/2023   Component Date Value Ref Range Status   • Glucose 03/21/2023 250 (H)  65 - 99 mg/dL Final   • BUN 03/21/2023 18  6 - 20 mg/dL Final   • Creatinine 03/21/2023 0.73  0.57 - 1.00 mg/dL Final   • Sodium 03/21/2023 132 (L)  136 - 145 mmol/L Final   • Potassium 03/21/2023 4.3  3.5 - 5.2 mmol/L Final    Slight hemolysis detected by analyzer. Results may be affected.   • Chloride 03/21/2023 98  98 - 107 mmol/L Final   • CO2 03/21/2023 18.9 (L)  22.0 - 29.0 mmol/L Final   • Calcium 03/21/2023 9.8  8.6 - 10.5 mg/dL Final   • Total Protein 03/21/2023 8.8 (H)  6.0 - 8.5 g/dL Final   • Albumin 03/21/2023 4.4  3.5 - 5.2 g/dL Final   • ALT (SGPT) 03/21/2023 18  1 - 33 U/L Final   • AST (SGOT) 03/21/2023 30  1  - 32 U/L Final   • Alkaline Phosphatase 03/21/2023 123 (H)  39 - 117 U/L Final   • Total Bilirubin 03/21/2023 0.4  0.0 - 1.2 mg/dL Final   • Globulin 03/21/2023 4.4  gm/dL Final   • A/G Ratio 03/21/2023 1.0  g/dL Final   • BUN/Creatinine Ratio 03/21/2023 24.7  7.0 - 25.0 Final   • Anion Gap 03/21/2023 15.1 (H)  5.0 - 15.0 mmol/L Final   • eGFR 03/21/2023 109.5  >60.0 mL/min/1.73 Final   • Lactate 03/21/2023 3.5 (C)  0.5 - 2.0 mmol/L Final   • Lipase 03/21/2023 54  13 - 60 U/L Final   • HCG Qualitative 03/21/2023 Negative  Negative Final   • WBC 03/21/2023 5.39  3.40 - 10.80 10*3/mm3 Final   • RBC 03/21/2023 4.05  3.77 - 5.28 10*6/mm3 Final   • Hemoglobin 03/21/2023 11.4 (L)  12.0 - 15.9 g/dL Final   • Hematocrit 03/21/2023 35.8  34.0 - 46.6 % Final   • MCV 03/21/2023 88.4  79.0 - 97.0 fL Final   • MCH 03/21/2023 28.1  26.6 - 33.0 pg Final   • MCHC 03/21/2023 31.8  31.5 - 35.7 g/dL Final   • RDW 03/21/2023 21.7 (H)  12.3 - 15.4 % Final   • RDW-SD 03/21/2023 68.0 (H)  37.0 - 54.0 fl Final   • MPV 03/21/2023 8.2  6.0 - 12.0 fL Final   • Platelets 03/21/2023 277  140 - 450 10*3/mm3 Final   • Neutrophil % 03/21/2023 41.4 (L)  42.7 - 76.0 % Final   • Lymphocyte % 03/21/2023 31.7  19.6 - 45.3 % Final   • Monocyte % 03/21/2023 9.5  5.0 - 12.0 % Final   • Eosinophil % 03/21/2023 15.2 (H)  0.3 - 6.2 % Final   • Basophil % 03/21/2023 2.0 (H)  0.0 - 1.5 % Final   • Immature Grans % 03/21/2023 0.2  0.0 - 0.5 % Final   • Neutrophils, Absolute 03/21/2023 2.23  1.70 - 7.00 10*3/mm3 Final   • Lymphocytes, Absolute 03/21/2023 1.71  0.70 - 3.10 10*3/mm3 Final   • Monocytes, Absolute 03/21/2023 0.51  0.10 - 0.90 10*3/mm3 Final   • Eosinophils, Absolute 03/21/2023 0.82 (H)  0.00 - 0.40 10*3/mm3 Final   • Basophils, Absolute 03/21/2023 0.11  0.00 - 0.20 10*3/mm3 Final   • Immature Grans, Absolute 03/21/2023 0.01  0.00 - 0.05 10*3/mm3 Final   • nRBC 03/21/2023 0.0  0.0 - 0.2 /100 WBC Final   Office Visit on 01/12/2023   Component Date  Value Ref Range Status   • Total Cholesterol 01/12/2023 142  0 - 200 mg/dL Final   • Triglycerides 01/12/2023 219 (H)  0 - 150 mg/dL Final   • HDL Cholesterol 01/12/2023 18 (L)  40 - 60 mg/dL Final   • LDL Cholesterol  01/12/2023 87  0 - 100 mg/dL Final   • VLDL Cholesterol 01/12/2023 37  5 - 40 mg/dL Final   • LDL/HDL Ratio 01/12/2023 4.46   Final   • Uric Acid 01/12/2023 4.3  2.4 - 5.7 mg/dL Final   • ADENOVIRUS, PCR 01/12/2023 Not Detected  Not Detected Final   • Coronavirus 229E 01/12/2023 Not Detected  Not Detected Final   • Coronavirus HKU1 01/12/2023 Not Detected  Not Detected Final   • Coronavirus NL63 01/12/2023 Not Detected  Not Detected Final   • Coronavirus OC43 01/12/2023 Not Detected  Not Detected Final   • COVID19 01/12/2023 Not Detected  Not Detected - Ref. Range Final   • Human Metapneumovirus 01/12/2023 Not Detected  Not Detected Final   • Human Rhinovirus/Enterovirus 01/12/2023 Not Detected  Not Detected Final   • Influenza A PCR 01/12/2023 Not Detected  Not Detected Final   • Influenza B PCR 01/12/2023 Not Detected  Not Detected Final   • Parainfluenza Virus 1 01/12/2023 Not Detected  Not Detected Final   • Parainfluenza Virus 2 01/12/2023 Not Detected  Not Detected Final   • Parainfluenza Virus 3 01/12/2023 Not Detected  Not Detected Final   • Parainfluenza Virus 4 01/12/2023 Not Detected  Not Detected Final   • RSV, PCR 01/12/2023 Not Detected  Not Detected Final   • Bordetella pertussis pcr 01/12/2023 Not Detected  Not Detected Final   • Bordetella parapertussis PCR 01/12/2023 Not Detected  Not Detected Final   • Chlamydophila pneumoniae PCR 01/12/2023 Not Detected  Not Detected Final   • Mycoplasma pneumo by PCR 01/12/2023 Not Detected  Not Detected Final   Admission on 01/08/2023, Discharged on 01/08/2023   Component Date Value Ref Range Status   • Glucose 01/08/2023 366 (H)  65 - 99 mg/dL Final   • BUN 01/08/2023 15  6 - 20 mg/dL Final   • Creatinine 01/08/2023 0.69  0.57 - 1.00 mg/dL Final    • Sodium 01/08/2023 128 (L)  136 - 145 mmol/L Final   • Potassium 01/08/2023 4.2  3.5 - 5.2 mmol/L Final    Slight hemolysis detected by analyzer. Results may be affected.   • Chloride 01/08/2023 95 (L)  98 - 107 mmol/L Final   • CO2 01/08/2023 18.2 (L)  22.0 - 29.0 mmol/L Final   • Calcium 01/08/2023 9.5  8.6 - 10.5 mg/dL Final   • Total Protein 01/08/2023 7.9  6.0 - 8.5 g/dL Final   • Albumin 01/08/2023 3.3 (L)  3.5 - 5.2 g/dL Final   • ALT (SGPT) 01/08/2023 9  1 - 33 U/L Final   • AST (SGOT) 01/08/2023 15  1 - 32 U/L Final   • Alkaline Phosphatase 01/08/2023 122 (H)  39 - 117 U/L Final   • Total Bilirubin 01/08/2023 0.3  0.0 - 1.2 mg/dL Final   • Globulin 01/08/2023 4.6  gm/dL Final   • A/G Ratio 01/08/2023 0.7  g/dL Final   • BUN/Creatinine Ratio 01/08/2023 21.7  7.0 - 25.0 Final   • Anion Gap 01/08/2023 14.8  5.0 - 15.0 mmol/L Final   • eGFR 01/08/2023 115.5  >60.0 mL/min/1.73 Final    National Kidney Foundation and American Society of Nephrology (ASN) Task Force recommended calculation based on the Chronic Kidney Disease Epidemiology Collaboration (CKD-EPI) equation refit without adjustment for race.   • C-Reactive Protein 01/08/2023 12.75 (H)  0.00 - 0.50 mg/dL Final   • Sed Rate 01/08/2023 124 (H)  0 - 20 mm/hr Final   • WBC 01/08/2023 11.58 (H)  3.40 - 10.80 10*3/mm3 Final   • RBC 01/08/2023 3.87  3.77 - 5.28 10*6/mm3 Final   • Hemoglobin 01/08/2023 10.2 (L)  12.0 - 15.9 g/dL Final   • Hematocrit 01/08/2023 32.5 (L)  34.0 - 46.6 % Final   • MCV 01/08/2023 84.0  79.0 - 97.0 fL Final   • MCH 01/08/2023 26.4 (L)  26.6 - 33.0 pg Final   • MCHC 01/08/2023 31.4 (L)  31.5 - 35.7 g/dL Final   • RDW 01/08/2023 16.0 (H)  12.3 - 15.4 % Final   • RDW-SD 01/08/2023 48.8  37.0 - 54.0 fl Final   • MPV 01/08/2023 8.9  6.0 - 12.0 fL Final   • Platelets 01/08/2023 340  140 - 450 10*3/mm3 Final   • Neutrophil % 01/08/2023 81.0 (H)  42.7 - 76.0 % Final   • Lymphocyte % 01/08/2023 10.2 (L)  19.6 - 45.3 % Final   • Monocyte %  01/08/2023 7.3  5.0 - 12.0 % Final   • Eosinophil % 01/08/2023 0.6  0.3 - 6.2 % Final   • Basophil % 01/08/2023 0.4  0.0 - 1.5 % Final   • Immature Grans % 01/08/2023 0.5  0.0 - 0.5 % Final   • Neutrophils, Absolute 01/08/2023 9.38 (H)  1.70 - 7.00 10*3/mm3 Final   • Lymphocytes, Absolute 01/08/2023 1.18  0.70 - 3.10 10*3/mm3 Final   • Monocytes, Absolute 01/08/2023 0.84  0.10 - 0.90 10*3/mm3 Final   • Eosinophils, Absolute 01/08/2023 0.07  0.00 - 0.40 10*3/mm3 Final   • Basophils, Absolute 01/08/2023 0.05  0.00 - 0.20 10*3/mm3 Final   • Immature Grans, Absolute 01/08/2023 0.06 (H)  0.00 - 0.05 10*3/mm3 Final   • nRBC 01/08/2023 0.0  0.0 - 0.2 /100 WBC Final   • Extra Tube 01/08/2023 Hold for add-ons.   Final    Auto resulted.   • Extra Tube 01/08/2023 hold for add-on   Final    Auto resulted   • Extra Tube 01/08/2023 Hold for add-ons.   Final    Auto resulted.   • Extra Tube 01/08/2023 Hold for add-ons.   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  Narcotic pain medication-patient has significant acute pain that I believe would be an indication for the use of narcotic pain medication.  I discussed the significant risks of pain medication and the fact that this will be a short only option and I will give her no more than a three-day supply of pain medication, I will not plan long-term medication, and that this will be sent to a pain clinic if it at all becomes necessary.  We discussed signing a pain medication agreement and the fact that we're going to run a state [facility] review to be sure the patient is not getting pain medication from elsewhere.  If this is the case, we will not give pain medication as part of the patient's treatment plan of there being prescribed a controlled substance with potential for abuse.  This patient has  been well aware of the appropriate dose of such medications including the risks for somnolence, limited ability to drive and/or safety and the significant potential for overdose.  It has been made clear that these medications are for the prescribed patient only without concomitant use of alcohol or other substance unless prescribed by the medical provider.  Has completed prescribing agreement detailing the terms of continue prescribing him a controlled substance including monitoring Pranay reports, the possibility of urine drug screens, and pill counts.  The patient is aware that we review PRANAY reports on a regular basis and scan them into the chart.  History and physical examination exhibited continued safe and appropriate use of controlled substances. We also discussed the fact that the new [place] legislation allows only a three-day prescription for pain medication.  In this situation he will be referred to a chronic pain clinic.            This document has been electronically signed by BRITTANY ELIZONDO MD April 3, 2023 12:43 EDT    Dictated Utilizing Dragon Dictation: Part of this note may be an electronic transcription/translation of spoken language to printed text using the Dragon Dictation System.

## 2023-04-07 ENCOUNTER — HOSPITAL ENCOUNTER (EMERGENCY)
Facility: HOSPITAL | Age: 38
Discharge: HOME | End: 2023-04-07
Attending: EMERGENCY MEDICINE | Admitting: EMERGENCY MEDICINE
Payer: COMMERCIAL

## 2023-04-07 VITALS
DIASTOLIC BLOOD PRESSURE: 90 MMHG | SYSTOLIC BLOOD PRESSURE: 141 MMHG | OXYGEN SATURATION: 98 % | RESPIRATION RATE: 18 BRPM | TEMPERATURE: 98.2 F | HEIGHT: 64 IN | WEIGHT: 277 LBS | HEART RATE: 95 BPM | BODY MASS INDEX: 47.29 KG/M2

## 2023-04-07 DIAGNOSIS — N30.01 ACUTE CYSTITIS WITH HEMATURIA: Primary | ICD-10-CM

## 2023-04-07 DIAGNOSIS — R10.9 RIGHT FLANK PAIN: ICD-10-CM

## 2023-04-07 LAB
ALBUMIN SERPL-MCNC: 4 G/DL (ref 3.5–5.2)
ALBUMIN/GLOB SERPL: 0.9 G/DL
ALP SERPL-CCNC: 129 U/L (ref 39–117)
ALT SERPL W P-5'-P-CCNC: 18 U/L (ref 1–33)
AMPHET+METHAMPHET UR QL: NEGATIVE
AMPHETAMINES UR QL: NEGATIVE
ANION GAP SERPL CALCULATED.3IONS-SCNC: 14.9 MMOL/L (ref 5–15)
AST SERPL-CCNC: 24 U/L (ref 1–32)
BACTERIA UR QL AUTO: ABNORMAL /HPF
BARBITURATES UR QL SCN: NEGATIVE
BASOPHILS # BLD AUTO: 0.03 10*3/MM3 (ref 0–0.2)
BASOPHILS NFR BLD AUTO: 0.5 % (ref 0–1.5)
BENZODIAZ UR QL SCN: NEGATIVE
BILIRUB SERPL-MCNC: 0.5 MG/DL (ref 0–1.2)
BILIRUB UR QL STRIP: NEGATIVE
BUN SERPL-MCNC: 22 MG/DL (ref 6–20)
BUN/CREAT SERPL: 32.4 (ref 7–25)
BUPRENORPHINE SERPL-MCNC: NEGATIVE NG/ML
CALCIUM SPEC-SCNC: 9.9 MG/DL (ref 8.6–10.5)
CANNABINOIDS SERPL QL: NEGATIVE
CHLORIDE SERPL-SCNC: 97 MMOL/L (ref 98–107)
CLARITY UR: CLEAR
CO2 SERPL-SCNC: 20.1 MMOL/L (ref 22–29)
COCAINE UR QL: NEGATIVE
COLOR UR: YELLOW
CREAT SERPL-MCNC: 0.68 MG/DL (ref 0.57–1)
CRP SERPL-MCNC: 1.26 MG/DL (ref 0–0.5)
DEPRECATED RDW RBC AUTO: 59.8 FL (ref 37–54)
EGFRCR SERPLBLD CKD-EPI 2021: 115.2 ML/MIN/1.73
EOSINOPHIL # BLD AUTO: 0.19 10*3/MM3 (ref 0–0.4)
EOSINOPHIL NFR BLD AUTO: 3 % (ref 0.3–6.2)
ERYTHROCYTE [DISTWIDTH] IN BLOOD BY AUTOMATED COUNT: 18.6 % (ref 12.3–15.4)
GLOBULIN UR ELPH-MCNC: 4.3 GM/DL
GLUCOSE SERPL-MCNC: 278 MG/DL (ref 65–99)
GLUCOSE UR STRIP-MCNC: ABNORMAL MG/DL
HCT VFR BLD AUTO: 34.2 % (ref 34–46.6)
HGB BLD-MCNC: 11.3 G/DL (ref 12–15.9)
HGB UR QL STRIP.AUTO: ABNORMAL
HYALINE CASTS UR QL AUTO: ABNORMAL /LPF
IMM GRANULOCYTES # BLD AUTO: 0.03 10*3/MM3 (ref 0–0.05)
IMM GRANULOCYTES NFR BLD AUTO: 0.5 % (ref 0–0.5)
KETONES UR QL STRIP: NEGATIVE
LEUKOCYTE ESTERASE UR QL STRIP.AUTO: ABNORMAL
LYMPHOCYTES # BLD AUTO: 1.45 10*3/MM3 (ref 0.7–3.1)
LYMPHOCYTES NFR BLD AUTO: 23.3 % (ref 19.6–45.3)
MCH RBC QN AUTO: 29 PG (ref 26.6–33)
MCHC RBC AUTO-ENTMCNC: 33 G/DL (ref 31.5–35.7)
MCV RBC AUTO: 87.9 FL (ref 79–97)
METHADONE UR QL SCN: NEGATIVE
MONOCYTES # BLD AUTO: 0.45 10*3/MM3 (ref 0.1–0.9)
MONOCYTES NFR BLD AUTO: 7.2 % (ref 5–12)
NEUTROPHILS NFR BLD AUTO: 4.08 10*3/MM3 (ref 1.7–7)
NEUTROPHILS NFR BLD AUTO: 65.5 % (ref 42.7–76)
NITRITE UR QL STRIP: NEGATIVE
NRBC BLD AUTO-RTO: 0 /100 WBC (ref 0–0.2)
OPIATES UR QL: POSITIVE
OXYCODONE UR QL SCN: POSITIVE
PCP UR QL SCN: NEGATIVE
PH UR STRIP.AUTO: <=5 [PH] (ref 5–8)
PLATELET # BLD AUTO: 262 10*3/MM3 (ref 140–450)
PMV BLD AUTO: 8.4 FL (ref 6–12)
POTASSIUM SERPL-SCNC: 4.5 MMOL/L (ref 3.5–5.2)
PROPOXYPH UR QL: NEGATIVE
PROT SERPL-MCNC: 8.3 G/DL (ref 6–8.5)
PROT UR QL STRIP: ABNORMAL
RBC # BLD AUTO: 3.89 10*6/MM3 (ref 3.77–5.28)
RBC # UR STRIP: ABNORMAL /HPF
REF LAB TEST METHOD: ABNORMAL
SODIUM SERPL-SCNC: 132 MMOL/L (ref 136–145)
SP GR UR STRIP: 1.02 (ref 1–1.03)
SQUAMOUS #/AREA URNS HPF: ABNORMAL /HPF
TRICYCLICS UR QL SCN: NEGATIVE
UROBILINOGEN UR QL STRIP: ABNORMAL
WBC # UR STRIP: ABNORMAL /HPF
WBC NRBC COR # BLD: 6.23 10*3/MM3 (ref 3.4–10.8)

## 2023-04-07 PROCEDURE — 80306 DRUG TEST PRSMV INSTRMNT: CPT | Performed by: PHYSICIAN ASSISTANT

## 2023-04-07 PROCEDURE — 25010000002 CEFTRIAXONE PER 250 MG: Performed by: PHYSICIAN ASSISTANT

## 2023-04-07 PROCEDURE — 25010000002 ONDANSETRON PER 1 MG: Performed by: PHYSICIAN ASSISTANT

## 2023-04-07 PROCEDURE — 25010000002 MORPHINE PER 10 MG: Performed by: EMERGENCY MEDICINE

## 2023-04-07 PROCEDURE — 85025 COMPLETE CBC W/AUTO DIFF WBC: CPT | Performed by: PHYSICIAN ASSISTANT

## 2023-04-07 PROCEDURE — 86140 C-REACTIVE PROTEIN: CPT | Performed by: PHYSICIAN ASSISTANT

## 2023-04-07 PROCEDURE — 96375 TX/PRO/DX INJ NEW DRUG ADDON: CPT

## 2023-04-07 PROCEDURE — 96365 THER/PROPH/DIAG IV INF INIT: CPT

## 2023-04-07 PROCEDURE — 80053 COMPREHEN METABOLIC PANEL: CPT | Performed by: PHYSICIAN ASSISTANT

## 2023-04-07 PROCEDURE — 99284 EMERGENCY DEPT VISIT MOD MDM: CPT

## 2023-04-07 PROCEDURE — 81001 URINALYSIS AUTO W/SCOPE: CPT | Performed by: PHYSICIAN ASSISTANT

## 2023-04-07 PROCEDURE — 87086 URINE CULTURE/COLONY COUNT: CPT | Performed by: PHYSICIAN ASSISTANT

## 2023-04-07 RX ORDER — ONDANSETRON 2 MG/ML
4 INJECTION INTRAMUSCULAR; INTRAVENOUS ONCE
Status: COMPLETED | OUTPATIENT
Start: 2023-04-07 | End: 2023-04-07

## 2023-04-07 RX ORDER — SODIUM CHLORIDE 0.9 % (FLUSH) 0.9 %
10 SYRINGE (ML) INJECTION AS NEEDED
Status: DISCONTINUED | OUTPATIENT
Start: 2023-04-07 | End: 2023-04-07 | Stop reason: HOSPADM

## 2023-04-07 RX ORDER — CEFDINIR 300 MG/1
300 CAPSULE ORAL 2 TIMES DAILY
Qty: 20 CAPSULE | Refills: 0 | Status: SHIPPED | OUTPATIENT
Start: 2023-04-07 | End: 2023-04-17

## 2023-04-07 RX ORDER — ONDANSETRON 4 MG/1
4 TABLET, ORALLY DISINTEGRATING ORAL EVERY 8 HOURS PRN
Qty: 21 TABLET | Refills: 0 | Status: SHIPPED | OUTPATIENT
Start: 2023-04-07 | End: 2023-04-14

## 2023-04-07 RX ADMIN — MORPHINE SULFATE 4 MG: 4 INJECTION, SOLUTION INTRAMUSCULAR; INTRAVENOUS at 10:41

## 2023-04-07 RX ADMIN — SODIUM CHLORIDE 1000 ML: 9 INJECTION, SOLUTION INTRAVENOUS at 10:41

## 2023-04-07 RX ADMIN — ONDANSETRON 4 MG: 2 INJECTION INTRAMUSCULAR; INTRAVENOUS at 10:41

## 2023-04-07 RX ADMIN — CEFTRIAXONE 1 G: 1 INJECTION, POWDER, FOR SOLUTION INTRAMUSCULAR; INTRAVENOUS at 12:16

## 2023-04-07 NOTE — ED PROVIDER NOTES
Subjective   History of Present Illness  This is a 38 year old female patient who presents to the ER with chief complaint of right flank pain. PMH significant for multiple kidney stones, left nephrectomy in October 2022, DM requiring insulin, HTN, HLD. Patient saw Dr. Motley on Monday and was told she had a 4 mm kidney stone that should pass on its own. Since early this morning, she has had right sided flank pain with radiation into the right low back. Pain is aching, moderate. She also has associated nausea, vomiting, dysuria, urinary frequency and hematuria.         Review of Systems   Constitutional: Negative.  Negative for fever.   HENT: Negative.    Respiratory: Negative.    Cardiovascular: Negative.  Negative for chest pain.   Gastrointestinal: Positive for abdominal pain, nausea and vomiting. Negative for abdominal distention, anal bleeding, blood in stool, constipation, diarrhea and rectal pain.   Endocrine: Negative.    Genitourinary: Positive for dysuria, flank pain, frequency and hematuria. Negative for decreased urine volume, difficulty urinating, dyspareunia, enuresis, genital sores, menstrual problem, pelvic pain, urgency, vaginal bleeding, vaginal discharge and vaginal pain.   Skin: Negative.    Neurological: Negative.    Psychiatric/Behavioral: Negative.    All other systems reviewed and are negative.      Past Medical History:   Diagnosis Date   • A-fib    • Abnormal ECG    • Anemia    • Anxiety    • Asthma    • Cancer     Ovarian   • Depression    • Diabetes mellitus    • DVT (deep venous thrombosis)    • Factor 5 Leiden mutation, heterozygous    • Fibroid    • GERD (gastroesophageal reflux disease)    • Gout    • H/O abdominal abscess    • History of sepsis    • History of transfusion    • Hyperlipidemia    • Hypothyroid    • Kidney stone    • Migraines    • Neuropathy    • Ovarian cancer 02/2021   • Ovarian cyst    • PE (pulmonary embolism)    • Polycystic ovary syndrome    • Preeclampsia    •  Rh incompatibility    • Stroke    • TIA (transient ischemic attack)    • Urinary tract infection    • Varicella        Allergies   Allergen Reactions   • Toradol [Ketorolac Tromethamine] Anaphylaxis and Hives   • Haldol [Haloperidol] Hives and Mental Status Change   • Tramadol Hives and Swelling   • Amoxicillin Hives and Rash   • Penicillins Hives and Rash       Past Surgical History:   Procedure Laterality Date   • ABDOMINAL SURGERY     • CARDIAC CATHETERIZATION     •  SECTION     • CHOLECYSTECTOMY     • COLONOSCOPY     • ENDOSCOPY     • EXTRACORPOREAL SHOCK WAVE LITHOTRIPSY (ESWL) Left 10/22/2021    Procedure: EXTRACORPOREAL SHOCKWAVE LITHOTRIPSY;  Surgeon: Milan Motley MD;  Location: [facility];  Service: Urology;  Laterality: Left;   • LITHOTRIPSY     • RIGHT OOPHORECTOMY     • URETEROSCOPY LASER LITHOTRIPSY WITH STENT INSERTION Left 10/01/2021    Procedure: URETEROSCOPY WITH STENT PLACEMENT;  Surgeon: Milan Motley MD;  Location: [facility];  Service: Urology;  Laterality: Left;   • URETEROSCOPY LASER LITHOTRIPSY WITH STENT INSERTION Left 2022    Procedure: URETEROSCOPY STENT REMOVAL;  Surgeon: Milan Motley MD;  Location: [facility];  Service: Urology;  Laterality: Left;   • URETEROSCOPY LASER LITHOTRIPSY WITH STENT INSERTION Left 2022    Procedure: CYSTOSCOPY RETROGRADE LEFT WITH STENT PLACEMENT;  Surgeon: Milan Motley MD;  Location: [facility];  Service: Urology;  Laterality: Left;       Family History   Problem Relation Age of Onset   • Hypertension Mother    • Diabetes Father    • Hypertension Father    • Heart attack Father    • No Known Problems Sister    • No Known Problems Brother    • No Known Problems Son    • No Known Problems Daughter    • No Known Problems Maternal Grandmother    • No Known Problems Maternal Grandfather    • No Known Problems Paternal Grandmother    • No Known Problems Paternal Grandfather    • No Known Problems Cousin    •  "Rheum arthritis Neg Hx    • Osteoarthritis Neg Hx    • Asthma Neg Hx    • Heart failure Neg Hx    • Hyperlipidemia Neg Hx    • Migraines Neg Hx    • Rashes / Skin problems Neg Hx    • Seizures Neg Hx    • Stroke Neg Hx    • Thyroid disease Neg Hx        Social History     Socioeconomic History   • Marital status:    Tobacco Use   • Smoking status: Never     Passive exposure: Never   • Smokeless tobacco: Never   Vaping Use   • Vaping Use: Never used   Substance and Sexual Activity   • Alcohol use: No   • Drug use: Never     Comment: Pt has track marks on right arm. Pt states \"It's from my INR being drawn.\"    • Sexual activity: Not Currently     Partners: Male     Birth control/protection: None           Objective   Physical Exam  Vitals and nursing note reviewed.   Constitutional:       General: She is not in acute distress.     Appearance: She is well-developed. She is not diaphoretic.   HENT:      Head: Normocephalic and atraumatic.      Right Ear: External ear normal.      Left Ear: External ear normal.      Nose: Nose normal.   Eyes:      Conjunctiva/sclera: Conjunctivae normal.      Pupils: Pupils are equal, round, and reactive to light.   Neck:      Vascular: No JVD.      Trachea: No tracheal deviation.   Cardiovascular:      Rate and Rhythm: Normal rate and regular rhythm.      Heart sounds: Normal heart sounds. No murmur heard.  Pulmonary:      Effort: Pulmonary effort is normal. No respiratory distress.      Breath sounds: Normal breath sounds. No wheezing.   Abdominal:      General: Bowel sounds are normal.      Palpations: Abdomen is soft.      Tenderness: There is no abdominal tenderness. There is no right CVA tenderness, left CVA tenderness, guarding or rebound.   Musculoskeletal:         General: No deformity. Normal range of motion.      Cervical back: Normal range of motion and neck supple.   Skin:     General: Skin is warm and dry.      Coloration: Skin is not pale.      Findings: No " erythema or rash.   Neurological:      Mental Status: She is alert and oriented to person, place, and time.      Cranial Nerves: No cranial nerve deficit.   Psychiatric:         Behavior: Behavior normal.         Thought Content: Thought content normal.         Procedures        Results for orders placed or performed during the hospital encounter of 04/07/23   Comprehensive Metabolic Panel    Specimen: Blood   Result Value Ref Range    Glucose 278 (H) 65 - 99 mg/dL    BUN 22 (H) 6 - 20 mg/dL    Creatinine 0.68 0.57 - 1.00 mg/dL    Sodium 132 (L) 136 - 145 mmol/L    Potassium 4.5 3.5 - 5.2 mmol/L    Chloride 97 (L) 98 - 107 mmol/L    CO2 20.1 (L) 22.0 - 29.0 mmol/L    Calcium 9.9 8.6 - 10.5 mg/dL    Total Protein 8.3 6.0 - 8.5 g/dL    Albumin 4.0 3.5 - 5.2 g/dL    ALT (SGPT) 18 1 - 33 U/L    AST (SGOT) 24 1 - 32 U/L    Alkaline Phosphatase 129 (H) 39 - 117 U/L    Total Bilirubin 0.5 0.0 - 1.2 mg/dL    Globulin 4.3 gm/dL    A/G Ratio 0.9 g/dL    BUN/Creatinine Ratio 32.4 (H) 7.0 - 25.0    Anion Gap 14.9 5.0 - 15.0 mmol/L    eGFR 115.2 >60.0 mL/min/1.73   Urinalysis With Culture If Indicated - Urine, Clean Catch    Specimen: Urine, Clean Catch   Result Value Ref Range    Color, UA Yellow Yellow, Straw    Appearance, UA Clear Clear    pH, UA <=5.0 5.0 - 8.0    Specific Gravity, UA 1.020 1.005 - 1.030    Glucose,  mg/dL (2+) (A) Negative    Ketones, UA Negative Negative    Bilirubin, UA Negative Negative    Blood, UA Large (3+) (A) Negative    Protein, UA 30 mg/dL (1+) (A) Negative    Leuk Esterase, UA Small (1+) (A) Negative    Nitrite, UA Negative Negative    Urobilinogen, UA 0.2 E.U./dL 0.2 - 1.0 E.U./dL   Urine Drug Screen - Urine, Clean Catch    Specimen: Urine, Clean Catch   Result Value Ref Range    THC, Screen, Urine Negative Negative    Phencyclidine (PCP), Urine Negative Negative    Cocaine Screen, Urine Negative Negative    Methamphetamine, Ur Negative Negative    Opiate Screen Positive (A) Negative     Amphetamine Screen, Urine Negative Negative    Benzodiazepine Screen, Urine Negative Negative    Tricyclic Antidepressants Screen Negative Negative    Methadone Screen, Urine Negative Negative    Barbiturates Screen, Urine Negative Negative    Oxycodone Screen, Urine Positive (A) Negative    Propoxyphene Screen Negative Negative    Buprenorphine, Screen, Urine Negative Negative   C-reactive Protein    Specimen: Blood   Result Value Ref Range    C-Reactive Protein 1.26 (H) 0.00 - 0.50 mg/dL   CBC Auto Differential    Specimen: Blood   Result Value Ref Range    WBC 6.23 3.40 - 10.80 10*3/mm3    RBC 3.89 3.77 - 5.28 10*6/mm3    Hemoglobin 11.3 (L) 12.0 - 15.9 g/dL    Hematocrit 34.2 34.0 - 46.6 %    MCV 87.9 79.0 - 97.0 fL    MCH 29.0 26.6 - 33.0 pg    MCHC 33.0 31.5 - 35.7 g/dL    RDW 18.6 (H) 12.3 - 15.4 %    RDW-SD 59.8 (H) 37.0 - 54.0 fl    MPV 8.4 6.0 - 12.0 fL    Platelets 262 140 - 450 10*3/mm3    Neutrophil % 65.5 42.7 - 76.0 %    Lymphocyte % 23.3 19.6 - 45.3 %    Monocyte % 7.2 5.0 - 12.0 %    Eosinophil % 3.0 0.3 - 6.2 %    Basophil % 0.5 0.0 - 1.5 %    Immature Grans % 0.5 0.0 - 0.5 %    Neutrophils, Absolute 4.08 1.70 - 7.00 10*3/mm3    Lymphocytes, Absolute 1.45 0.70 - 3.10 10*3/mm3    Monocytes, Absolute 0.45 0.10 - 0.90 10*3/mm3    Eosinophils, Absolute 0.19 0.00 - 0.40 10*3/mm3    Basophils, Absolute 0.03 0.00 - 0.20 10*3/mm3    Immature Grans, Absolute 0.03 0.00 - 0.05 10*3/mm3    nRBC 0.0 0.0 - 0.2 /100 WBC   Urinalysis, Microscopic Only - Urine, Clean Catch    Specimen: Urine, Clean Catch   Result Value Ref Range    RBC, UA Too Numerous to Count (A) None Seen, 0-2 /HPF    WBC, UA 21-30 (A) None Seen, 0-2 /HPF    Bacteria, UA None Seen None Seen /HPF    Squamous Epithelial Cells, UA 3-6 (A) None Seen, 0-2 /HPF    Hyaline Casts, UA None Seen None Seen /LPF    Methodology Automated Microscopy          ED Course  ED Course as of 04/07/23 1241   Fri Apr 07, 2023   1236 Patient diagnosed with right flank  pain and UTI. Will be d/c home with rx for omnicef and zofran. Will f/u with PCP in 2 days or return to ER if symptoms worsen.  [MM]      ED Course User Index  [MM] Nuvia Cee PA                                           Medical Decision Making    This is a 38 year old female patient who presents to the ER with chief complaint of right flank pain. PMH significant for multiple kidney stones, left nephrectomy in October 2022, DM requiring insulin, HTN, HLD. Patient saw Dr. Motley on Monday and was told she had a 4 mm kidney stone that should pass on its own. Since early this morning, she has had right sided flank pain with radiation into the right low back. Pain is aching, moderate. She also has associated nausea, vomiting, dysuria, urinary frequency and hematuria.         Acute cystitis with hematuria: acute illness or injury  Right flank pain: acute illness or injury  Amount and/or Complexity of Data Reviewed  Labs: ordered. Decision-making details documented in ED Course.      Risk  Prescription drug management.          Final diagnoses:   Acute cystitis with hematuria   Right flank pain       ED Disposition  ED Disposition     ED Disposition   Discharge    Condition   Stable    Comment   --             Milan Motley MD  [address] [facility] [address]  [facility] [address]  [phone]    In 2 days      Eddie Latif APRN  [address] [facility] [id number] [phone]    In 2 days           Medication List      New Prescriptions    cefdinir 300 MG capsule  Commonly known as: OMNICEF  Take 1 capsule by mouth 2 (Two) Times a Day for 10 days.        Changed    ondansetron ODT 4 MG disintegrating tablet  Commonly known as: ZOFRAN-ODT  Place 1 tablet on the tongue Every 8 (Eight) Hours As Needed for Nausea for up to 7 days.  What changed:   · when to take this  · reasons to take this        Stop    oxyCODONE-acetaminophen  MG per tablet  Commonly known as: Percocet     promethazine 25 MG  tablet  Commonly known as: PHENERGAN     promethazine-dextromethorphan 6.25-15 MG/5ML syrup  Commonly known as: PROMETHAZINE-DM           Where to Get Your Medications      These medications were sent to [facility] - [place] - [address] [place] [address] - [phone]  - [phone]   [address] [facility] [place] [address]    Phone: [phone]   · cefdinir 300 MG capsule  · ondansetron ODT 4 MG disintegrating tablet          Nuvia Cee PA  04/07/23 1244

## 2023-04-07 NOTE — DISCHARGE INSTRUCTIONS
Please drink lots of water and take your medications as prescribed. Please follow up with your PCP in 2 days or return to ER if symptoms worsen.

## 2023-04-08 LAB — BACTERIA SPEC AEROBE CULT: NORMAL

## 2023-04-09 ENCOUNTER — HOSPITAL ENCOUNTER (EMERGENCY)
Facility: HOSPITAL | Age: 38
Discharge: HOME | End: 2023-04-09
Attending: STUDENT IN AN ORGANIZED HEALTH CARE EDUCATION/TRAINING PROGRAM | Admitting: STUDENT IN AN ORGANIZED HEALTH CARE EDUCATION/TRAINING PROGRAM
Payer: COMMERCIAL

## 2023-04-09 ENCOUNTER — APPOINTMENT (OUTPATIENT)
Dept: CT IMAGING | Facility: HOSPITAL | Age: 38
End: 2023-04-09
Payer: COMMERCIAL

## 2023-04-09 VITALS
TEMPERATURE: 98.1 F | SYSTOLIC BLOOD PRESSURE: 148 MMHG | RESPIRATION RATE: 18 BRPM | DIASTOLIC BLOOD PRESSURE: 93 MMHG | OXYGEN SATURATION: 100 % | WEIGHT: 275 LBS | HEART RATE: 99 BPM | HEIGHT: 64 IN | BODY MASS INDEX: 46.95 KG/M2

## 2023-04-09 DIAGNOSIS — R18.8 RETROPERITONEAL FLUID COLLECTION: Primary | ICD-10-CM

## 2023-04-09 DIAGNOSIS — R10.30 LOWER ABDOMINAL PAIN: ICD-10-CM

## 2023-04-09 DIAGNOSIS — R10.9 RIGHT FLANK PAIN: ICD-10-CM

## 2023-04-09 LAB
ALBUMIN SERPL-MCNC: 4.1 G/DL (ref 3.5–5.2)
ALBUMIN/GLOB SERPL: 1 G/DL
ALP SERPL-CCNC: 138 U/L (ref 39–117)
ALT SERPL W P-5'-P-CCNC: 27 U/L (ref 1–33)
ANION GAP SERPL CALCULATED.3IONS-SCNC: 17.9 MMOL/L (ref 5–15)
AST SERPL-CCNC: 28 U/L (ref 1–32)
BACTERIA UR QL AUTO: ABNORMAL /HPF
BASOPHILS # BLD AUTO: 0.04 10*3/MM3 (ref 0–0.2)
BASOPHILS NFR BLD AUTO: 0.8 % (ref 0–1.5)
BILIRUB SERPL-MCNC: 0.4 MG/DL (ref 0–1.2)
BILIRUB UR QL STRIP: NEGATIVE
BUN SERPL-MCNC: 16 MG/DL (ref 6–20)
BUN/CREAT SERPL: 22.5 (ref 7–25)
CALCIUM SPEC-SCNC: 9.9 MG/DL (ref 8.6–10.5)
CHLORIDE SERPL-SCNC: 99 MMOL/L (ref 98–107)
CLARITY UR: CLEAR
CO2 SERPL-SCNC: 19.1 MMOL/L (ref 22–29)
COLOR UR: YELLOW
CREAT SERPL-MCNC: 0.71 MG/DL (ref 0.57–1)
CRP SERPL-MCNC: 1.71 MG/DL (ref 0–0.5)
DEPRECATED RDW RBC AUTO: 60.7 FL (ref 37–54)
EGFRCR SERPLBLD CKD-EPI 2021: 112.5 ML/MIN/1.73
EOSINOPHIL # BLD AUTO: 0.11 10*3/MM3 (ref 0–0.4)
EOSINOPHIL NFR BLD AUTO: 2.2 % (ref 0.3–6.2)
ERYTHROCYTE [DISTWIDTH] IN BLOOD BY AUTOMATED COUNT: 18.5 % (ref 12.3–15.4)
GLOBULIN UR ELPH-MCNC: 4.2 GM/DL
GLUCOSE BLDC GLUCOMTR-MCNC: 184 MG/DL (ref 70–130)
GLUCOSE SERPL-MCNC: 278 MG/DL (ref 65–99)
GLUCOSE UR STRIP-MCNC: ABNORMAL MG/DL
HCG SERPL QL: NEGATIVE
HCT VFR BLD AUTO: 34.9 % (ref 34–46.6)
HGB BLD-MCNC: 11.7 G/DL (ref 12–15.9)
HGB UR QL STRIP.AUTO: NEGATIVE
HOLD SPECIMEN: NORMAL
HYALINE CASTS UR QL AUTO: ABNORMAL /LPF
IMM GRANULOCYTES # BLD AUTO: 0.01 10*3/MM3 (ref 0–0.05)
IMM GRANULOCYTES NFR BLD AUTO: 0.2 % (ref 0–0.5)
KETONES UR QL STRIP: NEGATIVE
LEUKOCYTE ESTERASE UR QL STRIP.AUTO: ABNORMAL
LIPASE SERPL-CCNC: 33 U/L (ref 13–60)
LYMPHOCYTES # BLD AUTO: 1.25 10*3/MM3 (ref 0.7–3.1)
LYMPHOCYTES NFR BLD AUTO: 25 % (ref 19.6–45.3)
MCH RBC QN AUTO: 30.3 PG (ref 26.6–33)
MCHC RBC AUTO-ENTMCNC: 33.5 G/DL (ref 31.5–35.7)
MCV RBC AUTO: 90.4 FL (ref 79–97)
MONOCYTES # BLD AUTO: 0.3 10*3/MM3 (ref 0.1–0.9)
MONOCYTES NFR BLD AUTO: 6 % (ref 5–12)
NEUTROPHILS NFR BLD AUTO: 3.29 10*3/MM3 (ref 1.7–7)
NEUTROPHILS NFR BLD AUTO: 65.8 % (ref 42.7–76)
NITRITE UR QL STRIP: NEGATIVE
NRBC BLD AUTO-RTO: 0 /100 WBC (ref 0–0.2)
PH UR STRIP.AUTO: 5.5 [PH] (ref 5–8)
PLATELET # BLD AUTO: 249 10*3/MM3 (ref 140–450)
PMV BLD AUTO: 8.5 FL (ref 6–12)
POTASSIUM SERPL-SCNC: 4.6 MMOL/L (ref 3.5–5.2)
PROT SERPL-MCNC: 8.3 G/DL (ref 6–8.5)
PROT UR QL STRIP: ABNORMAL
RBC # BLD AUTO: 3.86 10*6/MM3 (ref 3.77–5.28)
RBC # UR STRIP: ABNORMAL /HPF
REF LAB TEST METHOD: ABNORMAL
SODIUM SERPL-SCNC: 136 MMOL/L (ref 136–145)
SP GR UR STRIP: >1.03 (ref 1–1.03)
SQUAMOUS #/AREA URNS HPF: ABNORMAL /HPF
UROBILINOGEN UR QL STRIP: ABNORMAL
WBC # UR STRIP: ABNORMAL /HPF
WBC NRBC COR # BLD: 5 10*3/MM3 (ref 3.4–10.8)
WHOLE BLOOD HOLD COAG: NORMAL
WHOLE BLOOD HOLD SPECIMEN: NORMAL
YEAST URNS QL MICRO: ABNORMAL /HPF

## 2023-04-09 PROCEDURE — 74177 CT ABD & PELVIS W/CONTRAST: CPT

## 2023-04-09 PROCEDURE — 80053 COMPREHEN METABOLIC PANEL: CPT | Performed by: STUDENT IN AN ORGANIZED HEALTH CARE EDUCATION/TRAINING PROGRAM

## 2023-04-09 PROCEDURE — 96375 TX/PRO/DX INJ NEW DRUG ADDON: CPT

## 2023-04-09 PROCEDURE — 63710000001 INSULIN REGULAR HUMAN PER 5 UNITS: Performed by: STUDENT IN AN ORGANIZED HEALTH CARE EDUCATION/TRAINING PROGRAM

## 2023-04-09 PROCEDURE — 87086 URINE CULTURE/COLONY COUNT: CPT | Performed by: STUDENT IN AN ORGANIZED HEALTH CARE EDUCATION/TRAINING PROGRAM

## 2023-04-09 PROCEDURE — 83690 ASSAY OF LIPASE: CPT | Performed by: STUDENT IN AN ORGANIZED HEALTH CARE EDUCATION/TRAINING PROGRAM

## 2023-04-09 PROCEDURE — 82962 GLUCOSE BLOOD TEST: CPT

## 2023-04-09 PROCEDURE — 86140 C-REACTIVE PROTEIN: CPT | Performed by: STUDENT IN AN ORGANIZED HEALTH CARE EDUCATION/TRAINING PROGRAM

## 2023-04-09 PROCEDURE — 84703 CHORIONIC GONADOTROPIN ASSAY: CPT | Performed by: STUDENT IN AN ORGANIZED HEALTH CARE EDUCATION/TRAINING PROGRAM

## 2023-04-09 PROCEDURE — 25010000002 MORPHINE PER 10 MG: Performed by: STUDENT IN AN ORGANIZED HEALTH CARE EDUCATION/TRAINING PROGRAM

## 2023-04-09 PROCEDURE — 81001 URINALYSIS AUTO W/SCOPE: CPT | Performed by: STUDENT IN AN ORGANIZED HEALTH CARE EDUCATION/TRAINING PROGRAM

## 2023-04-09 PROCEDURE — 25510000001 IOPAMIDOL 61 % SOLUTION: Performed by: STUDENT IN AN ORGANIZED HEALTH CARE EDUCATION/TRAINING PROGRAM

## 2023-04-09 PROCEDURE — 25010000002 ONDANSETRON PER 1 MG: Performed by: STUDENT IN AN ORGANIZED HEALTH CARE EDUCATION/TRAINING PROGRAM

## 2023-04-09 PROCEDURE — P9612 CATHETERIZE FOR URINE SPEC: HCPCS

## 2023-04-09 PROCEDURE — 72128 CT CHEST SPINE W/O DYE: CPT

## 2023-04-09 PROCEDURE — 36415 COLL VENOUS BLD VENIPUNCTURE: CPT

## 2023-04-09 PROCEDURE — 85025 COMPLETE CBC W/AUTO DIFF WBC: CPT | Performed by: STUDENT IN AN ORGANIZED HEALTH CARE EDUCATION/TRAINING PROGRAM

## 2023-04-09 PROCEDURE — 96374 THER/PROPH/DIAG INJ IV PUSH: CPT

## 2023-04-09 PROCEDURE — 99284 EMERGENCY DEPT VISIT MOD MDM: CPT

## 2023-04-09 RX ORDER — ONDANSETRON 2 MG/ML
4 INJECTION INTRAMUSCULAR; INTRAVENOUS ONCE
Status: COMPLETED | OUTPATIENT
Start: 2023-04-09 | End: 2023-04-09

## 2023-04-09 RX ORDER — SODIUM CHLORIDE 0.9 % (FLUSH) 0.9 %
10 SYRINGE (ML) INJECTION AS NEEDED
Status: DISCONTINUED | OUTPATIENT
Start: 2023-04-09 | End: 2023-04-09 | Stop reason: HOSPADM

## 2023-04-09 RX ADMIN — SODIUM CHLORIDE 1000 ML: 9 INJECTION, SOLUTION INTRAVENOUS at 13:35

## 2023-04-09 RX ADMIN — HUMAN INSULIN 5 UNITS: 100 INJECTION, SOLUTION SUBCUTANEOUS at 14:44

## 2023-04-09 RX ADMIN — ONDANSETRON 4 MG: 2 INJECTION INTRAMUSCULAR; INTRAVENOUS at 13:35

## 2023-04-09 RX ADMIN — IOPAMIDOL 88 ML: 612 INJECTION, SOLUTION INTRAVENOUS at 14:02

## 2023-04-09 RX ADMIN — MORPHINE SULFATE 4 MG: 4 INJECTION, SOLUTION INTRAMUSCULAR; INTRAVENOUS at 13:35

## 2023-04-09 NOTE — DISCHARGE INSTRUCTIONS
Please follow-up with your [facility] teams including her urologist and infectious disease specialist as soon as possible as discussed.  Do not hesitate to return here for any new onset or worsening symptoms.

## 2023-04-09 NOTE — ED PROVIDER NOTES
[facility]  emergency department encounter        Pt Name: Natalia Arzate  MRN: [id number]  Birthdate 1985  Date of evaluation: 4/9/2023    Chief Complaint   Patient presents with   • Flank Pain             HISTORY OF PRESENT ILLNESS:   Natalia Arzate is a 38 y.o. female PMH HTN, HLD, DM, VTE, factor V Leiden, nephrolithiasis, PCOS, atrial fibrillation, ovarian cancer, GERD, anemia, asthma, CVA with residual left-sided weakness, morbid obesity.  Chronically anticoagulated on Eliquis.    Patient presents for prominent complaint of malaise and bilateral lower quadrant abdominal pain nausea onset over the past couple of days.  Patient reports she was diagnosed with right-sided 4 mm renal stone 6 days ago Dr. Motley's office, same day as onset of right flank pain.  Pain in the right flank described as intermittent, stabbing.  Abdominal pain described as sharp without further radiation.  Reports she has been unable to urinate for the past 16 hours.  Currently on cefdinir for UTI diagnosed 2 days ago.  Reports no improvement with cefdinir over the past couple of days.  Patient additionally reports getting up from seated position yesterday fell backwards hurting her upper thoracic spine.  Denies hitting her head, neck pain.  Reports risk of falls due to residual left-sided weakness from CVA 1 year ago.    Urine culture from 2 days ago noted 50,000 CFU mixed hafsa.  No culture or susceptibilities.      Addendum: Patient has left nephrectomy and prior left retroperitoneal abscess        PCP: Eddie Latif APRN          REVIEW OF SYSTEMS:     Review of Systems   Constitutional: Negative for fever.   Respiratory: Negative for cough and shortness of breath.    Cardiovascular: Negative for chest pain.   Gastrointestinal: Positive for abdominal pain and nausea.   Genitourinary: Positive for decreased urine volume, dysuria and flank pain.   Musculoskeletal: Positive for back pain.    Psychiatric/Behavioral: Negative for confusion.       Review of systems otherwise as per HPI.          PREVIOUS HISTORY:         Past Medical History:   Diagnosis Date   • A-fib    • Abnormal ECG    • Anemia    • Anxiety    • Asthma    • Cancer     Ovarian   • Depression    • Diabetes mellitus    • DVT (deep venous thrombosis)    • Factor 5 Leiden mutation, heterozygous    • Fibroid    • GERD (gastroesophageal reflux disease)    • Gout    • H/O abdominal abscess    • History of sepsis    • History of transfusion    • Hyperlipidemia    • Hypothyroid    • Kidney stone    • Migraines    • Neuropathy    • Ovarian cancer 2021   • Ovarian cyst    • PE (pulmonary embolism)    • Polycystic ovary syndrome    • Preeclampsia    • Rh incompatibility    • Stroke    • TIA (transient ischemic attack)    • Urinary tract infection    • Varicella            Past Surgical History:   Procedure Laterality Date   • ABDOMINAL SURGERY     • CARDIAC CATHETERIZATION     •  SECTION     • CHOLECYSTECTOMY     • COLONOSCOPY     • ENDOSCOPY     • EXTRACORPOREAL SHOCK WAVE LITHOTRIPSY (ESWL) Left 10/22/2021    Procedure: EXTRACORPOREAL SHOCKWAVE LITHOTRIPSY;  Surgeon: Milan Motley MD;  Location: [facility];  Service: Urology;  Laterality: Left;   • LITHOTRIPSY     • RIGHT OOPHORECTOMY     • URETEROSCOPY LASER LITHOTRIPSY WITH STENT INSERTION Left 10/01/2021    Procedure: URETEROSCOPY WITH STENT PLACEMENT;  Surgeon: Milan Motley MD;  Location: [facility];  Service: Urology;  Laterality: Left;   • URETEROSCOPY LASER LITHOTRIPSY WITH STENT INSERTION Left 2022    Procedure: URETEROSCOPY STENT REMOVAL;  Surgeon: Milan Motley MD;  Location: [facility];  Service: Urology;  Laterality: Left;   • URETEROSCOPY LASER LITHOTRIPSY WITH STENT INSERTION Left 2022    Procedure: CYSTOSCOPY RETROGRADE LEFT WITH STENT PLACEMENT;  Surgeon: Milan Motley MD;  Location: [facility];  Service: Urology;   "Laterality: Left;           Social History     Socioeconomic History   • Marital status:    Tobacco Use   • Smoking status: Never     Passive exposure: Never   • Smokeless tobacco: Never   Vaping Use   • Vaping Use: Never used   Substance and Sexual Activity   • Alcohol use: No   • Drug use: Never     Comment: Pt has track marks on right arm. Pt states \"It's from my INR being drawn.\"    • Sexual activity: Not Currently     Partners: Male     Birth control/protection: None           Family History   Problem Relation Age of Onset   • Hypertension Mother    • Diabetes Father    • Hypertension Father    • Heart attack Father    • No Known Problems Sister    • No Known Problems Brother    • No Known Problems Son    • No Known Problems Daughter    • No Known Problems Maternal Grandmother    • No Known Problems Maternal Grandfather    • No Known Problems Paternal Grandmother    • No Known Problems Paternal Grandfather    • No Known Problems Cousin    • Rheum arthritis Neg Hx    • Osteoarthritis Neg Hx    • Asthma Neg Hx    • Heart failure Neg Hx    • Hyperlipidemia Neg Hx    • Migraines Neg Hx    • Rashes / Skin problems Neg Hx    • Seizures Neg Hx    • Stroke Neg Hx    • Thyroid disease Neg Hx          Current Outpatient Medications   Medication Instructions   • albuterol (PROVENTIL) 2.5 mg, Nebulization, Every 6 Hours PRN   • albuterol sulfate  (90 Base) MCG/ACT inhaler 2 puffs, Inhalation, Every 4 Hours PRN   • allopurinol (ZYLOPRIM) 100 mg, Oral, Daily   • amLODIPine (NORVASC) 10 mg, Oral, Daily   • apixaban (ELIQUIS) 5 mg, Oral, 2 Times Daily   • azelastine (ASTELIN) 0.1 % nasal spray 2 sprays both nostrils twice daily as needed   • cefdinir (OMNICEF) 300 mg, Oral, 2 Times Daily   • Continuous Blood Gluc Sensor (Dexcom G6 Sensor) Does not apply, Every 10 Days   • cyanocobalamin ([employer] VITAMIN B-12) 2,000 mcg, Oral, Daily   • DULoxetine (CYMBALTA) 30 mg, Oral, 2 Times Daily   • gabapentin (NEURONTIN) 300 " "mg, Oral, Nightly PRN   • glucose blood test strip 1 each, Other, 3 Times Daily   • Insulin Glargine (Lantus SoloStar) 100 UNIT/ML injection pen Maximum daily dose of 75 units.   • Insulin Lispro, 1 Unit Dial, (HUMALOG) 100 UNIT/ML solution pen-injector Per sliding scale   • Insulin Pen Needle 30G X 8 MM misc 1 Device, Does not apply, 4 Times Daily   • ipratropium-albuterol (DUO-NEB) 0.5-2.5 mg/3 ml nebulizer 3 mL, Nebulization, Every 4 Hours PRN   • Lancets Micro Thin 33G misc 1 Device, Does not apply, 3 Times Daily   • metoprolol succinate XL (TOPROL-XL) 50 mg, Oral, Daily   • mometasone-formoterol (DULERA 200) 200-5 MCG/ACT inhaler 2 puffs twice daily   • montelukast (SINGULAIR) 10 mg, Oral, Nightly   • nystatin (MYCOSTATIN) 331007 UNIT/GM powder Topical, 3 Times Daily   • O2 (OXYGEN) 2 L/min, Once   • ondansetron ODT (ZOFRAN-ODT) 4 mg, Translingual, Every 8 Hours PRN   • polyethylene glycol (MIRALAX) 17 g, Oral, Daily   • Respiratory Therapy Supplies (Nebulizer/Tubing/Mouthpiece) kit 1 each, Does not apply, Take As Directed   • Semaglutide (2 MG/DOSE) (OZEMPIC) 2 mg, Subcutaneous, Weekly   • ubrogepant (UBRELVY) 100 mg, Oral, Once As Needed   • vitamin D (ERGOCALCIFEROL) 50,000 Units, Oral, Weekly, Prior to [facility] Admission, Patient was on:  takes on saturday         Allergies:  Toradol [ketorolac tromethamine], Haldol [haloperidol], Tramadol, Amoxicillin, and Penicillins          PHYSICAL EXAM:     Patient Vitals for the past 24 hrs:   BP Temp Temp src Pulse Resp SpO2 Height Weight   04/09/23 1630 148/93 -- -- 99 -- 100 % -- --   04/09/23 1500 133/90 -- -- 94 -- 99 % -- --   04/09/23 1430 (!) 142/101 -- -- 95 -- 98 % -- --   04/09/23 1345 128/94 -- -- 98 -- 97 % -- --   04/09/23 1242 146/100 98.1 °F (36.7 °C) Oral -- -- -- -- --   04/09/23 1240 -- -- -- 100 18 100 % 162.6 cm (64\") 125 kg (275 lb)       Physical Exam  Vitals and nursing note reviewed.   Constitutional:       General: She is not in acute " distress.     Appearance: She is obese. She is not ill-appearing or toxic-appearing.   HENT:      Head: Normocephalic and atraumatic.   Eyes:      Extraocular Movements: Extraocular movements intact.      Conjunctiva/sclera: Conjunctivae normal.   Cardiovascular:      Rate and Rhythm: Regular rhythm.   Pulmonary:      Effort: Pulmonary effort is normal. No respiratory distress.   Abdominal:      General: Abdomen is flat. There is no distension.      Palpations: Abdomen is soft.      Tenderness: There is no abdominal tenderness. There is no guarding or rebound.   Musculoskeletal:         General: No deformity. Normal range of motion.      Cervical back: Normal range of motion. No rigidity.   Skin:     Coloration: Skin is not jaundiced.      Findings: No rash.   Neurological:      General: No focal deficit present.      Mental Status: She is alert and oriented to person, place, and time.   Psychiatric:         Mood and Affect: Mood normal.         Behavior: Behavior normal.             COMPLETED DIAGNOSTIC STUDIES AND INTERVENTIONS:     Lab Results (last 24 hours)     Procedure Component Value Units Date/Time    Comprehensive Metabolic Panel [[id number]]  (Abnormal) Collected: 04/09/23 1332    Specimen: Blood from Arm, Left Updated: 04/09/23 1355     Glucose 278 mg/dL      BUN 16 mg/dL      Creatinine 0.71 mg/dL      Sodium 136 mmol/L      Potassium 4.6 mmol/L      Comment: Slight hemolysis detected by analyzer. Results may be affected.        Chloride 99 mmol/L      CO2 19.1 mmol/L      Calcium 9.9 mg/dL      Total Protein 8.3 g/dL      Albumin 4.1 g/dL      ALT (SGPT) 27 U/L      AST (SGOT) 28 U/L      Alkaline Phosphatase 138 U/L      Total Bilirubin 0.4 mg/dL      Globulin 4.2 gm/dL      A/G Ratio 1.0 g/dL      BUN/Creatinine Ratio 22.5     Anion Gap 17.9 mmol/L      eGFR 112.5 mL/min/1.73     Narrative:      GFR Normal >60  Chronic Kidney Disease <60  Kidney Failure <15      CBC & Differential [[id number]]   (Abnormal) Collected: 04/09/23 1332    Specimen: Blood from Arm, Left Updated: 04/09/23 1337    Narrative:      The following orders were created for panel order CBC & Differential.  Procedure                               Abnormality         Status                     ---------                               -----------         ------                     CBC Auto Differential[[id number]]        Abnormal            Final result                 Please view results for these tests on the individual orders.    Lipase [[id number]]  (Normal) Collected: 04/09/23 1332    Specimen: Blood from Arm, Left Updated: 04/09/23 1355     Lipase 33 U/L     C-reactive Protein [[id number]]  (Abnormal) Collected: 04/09/23 1332    Specimen: Blood from Arm, Left Updated: 04/09/23 1355     C-Reactive Protein 1.71 mg/dL     hCG, Serum, Qualitative [[id number]]  (Normal) Collected: 04/09/23 1332    Specimen: Blood from Arm, Left Updated: 04/09/23 1349     HCG Qualitative Negative    CBC Auto Differential [[id number]]  (Abnormal) Collected: 04/09/23 1332    Specimen: Blood from Arm, Left Updated: 04/09/23 1337     WBC 5.00 10*3/mm3      RBC 3.86 10*6/mm3      Hemoglobin 11.7 g/dL      Hematocrit 34.9 %      MCV 90.4 fL      MCH 30.3 pg      MCHC 33.5 g/dL      RDW 18.5 %      RDW-SD 60.7 fl      MPV 8.5 fL      Platelets 249 10*3/mm3      Neutrophil % 65.8 %      Lymphocyte % 25.0 %      Monocyte % 6.0 %      Eosinophil % 2.2 %      Basophil % 0.8 %      Immature Grans % 0.2 %      Neutrophils, Absolute 3.29 10*3/mm3      Lymphocytes, Absolute 1.25 10*3/mm3      Monocytes, Absolute 0.30 10*3/mm3      Eosinophils, Absolute 0.11 10*3/mm3      Basophils, Absolute 0.04 10*3/mm3      Immature Grans, Absolute 0.01 10*3/mm3      nRBC 0.0 /100 WBC     Urinalysis With Culture If Indicated - Straight Cath [[id number]]  (Abnormal) Collected: 04/09/23 1529    Specimen: Urine from Straight Cath Updated: 04/09/23 1541     Color, UA Yellow     Appearance, UA Clear      pH, UA 5.5     Specific Gravity, UA >1.030     Glucose,  mg/dL (Trace)     Ketones, UA Negative     Bilirubin, UA Negative     Blood, UA Negative     Protein, UA Trace     Leuk Esterase, UA Trace     Nitrite, UA Negative     Urobilinogen, UA 0.2 E.U./dL    Narrative:      In absence of clinical symptoms, the presence of pyuria, bacteria, and/or nitrites on the urinalysis result does not correlate with infection.    Urinalysis, Microscopic Only - Straight Cath [[id number]]  (Abnormal) Collected: 04/09/23 1529    Specimen: Urine from Straight Cath Updated: 04/09/23 1545     RBC, UA 0-2 /HPF      WBC, UA 13-20 /HPF      Bacteria, UA None Seen /HPF      Squamous Epithelial Cells, UA 0-2 /HPF      Yeast, UA Small/1+ Budding Yeast /HPF      Hyaline Casts, UA 0-2 /LPF      Methodology Manual Light Microscopy    Urine Culture - Urine, Straight Cath [[id number]] Collected: 04/09/23 1529    Specimen: Urine from Straight Cath Updated: 04/09/23 1545    POC Glucose Once [[id number]]  (Abnormal) Collected: 04/09/23 1602    Specimen: Blood Updated: 04/09/23 1618     Glucose 184 mg/dL      Comment: Meter: [id number] : [id number] [facility]               CT Abdomen Pelvis With Contrast   Final Result   1.  Enlarging left flank fluid collection deep to the abdominal wall after percutaneous drain removal since March 2023.   2.  Left nephrectomy.   3.  Tiny nonobstructing right renal calculi. No evidence of right ureteral obstruction or renal inflammation.   4.  Hepatomegaly. Cholecystectomy.      Signer Name: Esteban Banegas MD    Signed: 4/9/2023 3:09 PM    Workstation Name: [facility] [email or web address]     [facility]      CT Thoracic Spine Without Contrast   Final Result   Negative CT imaging of the thoracic spine.      Signer Name: Esteban Banegas MD    Signed: 4/9/2023 3:02 PM    Workstation Name: [facility] [email or web address]     [facility] Ordered This Visit    Medications   • sodium chloride 0.9 % flush 10 mL   • sodium chloride 0.9 % bolus 1,000 mL   • ondansetron (ZOFRAN) injection 4 mg   • morphine injection 4 mg   • iopamidol (ISOVUE-300) 61 % injection 100 mL   • insulin regular (humuLIN R,novoLIN R) injection 5 Units         Procedures            MEDICAL DECISION-MAKING AND ED COURSE:     ED Course as of 04/09/23 1727   Sun Apr 09, 2023   1304 MDM: 38-year-old female presents with predominant complaint of bilateral lower quadrant abdominal pain for the past couple of days as well as right flank pain from ureterolithiasis 6 days ago, dysuria and diagnosed UTI 2 days ago treated with cefdinir with symptoms progressively worsening.  Urinalysis from 2 days ago with elevated white blood cell count 21-30 in the setting of too many to count red blood cells, likely elevated elevated urine WBC appropriate in setting of too many to count red blood cells and nephrolithiasis.   [KP]   1426 WBC: 5.00 [KP]   1426 Hemoglobin(!): 11.7 [KP]   1426 Platelets: 249 [KP]   1426 Creatinine: 0.71 [KP]   1426 Glucose(!): 278 [KP]   1426 C-Reactive Protein(!): 1.71 [KP]   1506 Chart review developed additional history of retroperitoneal abscess and solitary right kidney s/p left nephrectomy.  When patient was seen at Dr. Motley's office 6 days ago she reported that she was diagnosed with a right-sided 4 mm renal stone by Dr. Lord at  previously.  Patient had KUB done 4 days ago that did not confirm stone.  Patient had CT imaging of abdomen pelvis 9 days ago at  that noted 4 mm right nonobstructing kidney stone. [KP]   1508 CT Thoracic Spine Without Contrast  Negative CT imaging of the thoracic spine. [KP]   1612 Nitrite, UA: Negative [KP]   1612 WBC, UA(!): 13-20 [KP]   1612 Bacteria, UA: None Seen [KP]   1612 CT Abdomen Pelvis With Contrast  1.  Enlarging left flank fluid collection deep to the abdominal wall after percutaneous drain removal since March 2023.  2.  Left  nephrectomy.  3.  Tiny nonobstructing right renal calculi. No evidence of right ureteral obstruction or renal inflammation.  4.  Hepatomegaly. Cholecystectomy. [KP]   1648 Case discussed with  transfer physician Dr. Carbajal regarding enlarging fluid collection in the left flank.  Patient did have prior abscess for which she had a PICC line for IV antibiotics.  White blood cell count 5, minimal elevation of CRP and patient does not complain of pain in the left flank.  No clear evidence that this is reoccurring abscess, possible seroma.  Patient super morbidly obese, heart rate in the 90s likely baseline, no significant tachycardia.  Dr. Carbajal has agreed to message to patient's infectious disease specialist as well as her urologist Dr. Mckee to obtain quick follow-up in the next couple of days.  CT imaging does not show explanatory findings regarding right flank pain.  Mild white count elevation with no bacteria, urine culture sent. [KP]   7944 Discussed findings and recommendations with patient was agreeable to plan.  She is already on cefdinir for UTI diagnosed 2 days ago, will not change antibiotic at this time, urine culture and susceptibilities pending. [KP]      ED Course User Index  [KP] Spencer Murray MD       ?      FINAL IMPRESSION:       1. Retroperitoneal fluid collection    2. Right flank pain    3. Lower abdominal pain         The complaints listed here are new problems to this examiner.       Medication List      No changes were made to your prescriptions during this visit.         FOLLOW-UP  Kailash Mckee MD  [id number]  [facility] [address]  [phone]    Schedule an appointment as soon as possible for a visit       [facility] Emergency Department  [address] [place] [id number] [phone]    If symptoms worsen      DISPOSITION  ED Disposition     ED Disposition   Discharge    Condition   Stable    Comment   --                 Please note that  portions of this note were completed with a voice recognition program.      Spencer Murray MD  17:27 EDT  4/9/2023             Spencer Murray MD  04/09/23 1725       Spencer Murray MD  04/09/23 1727

## 2023-04-11 LAB — BACTERIA SPEC AEROBE CULT: ABNORMAL

## 2023-04-12 DIAGNOSIS — Z79.4 TYPE 2 DIABETES MELLITUS WITH DIABETIC NEUROPATHY, WITH LONG-TERM CURRENT USE OF INSULIN: ICD-10-CM

## 2023-04-12 DIAGNOSIS — E11.40 TYPE 2 DIABETES MELLITUS WITH DIABETIC NEUROPATHY, WITH LONG-TERM CURRENT USE OF INSULIN: ICD-10-CM

## 2023-04-12 NOTE — TELEPHONE ENCOUNTER
Caller: Natalia Arzate    Relationship: Self    Best call back number: [phone]    Requested Prescriptions:   Requested Prescriptions     Pending Prescriptions Disp Refills   • gabapentin (NEURONTIN) 300 MG capsule 30 capsule 0     Sig: Take 1 capsule by mouth At Night As Needed (neuropathy) for up to 30 days.        Pharmacy where request should be sent: [address] [phone] [facility] [phone] FX     Last office visit with prescribing clinician: 3/28/2023   Last telemedicine visit with prescribing clinician: 4/25/2023   Next office visit with prescribing clinician: 4/25/2023     Additional details provided by patient: PATIENT IS COMPLETELY OUT OF THIS MEDICATION AND THE HOSPITAL ALSO PUT THE PATIENT  MG INSTEAD OF 300MG. CAN WE SEND IN 400MG INSTEAD?    Does the patient have less than a 3 day supply:  [x] Yes  [] No    Would you like a call back once the refill request has been completed: [] Yes [x] No    If the office needs to give you a call back, can they leave a voicemail: [] Yes [x] No    Juan Shannon Rep   04/12/23 12:27 EDT

## 2023-04-13 ENCOUNTER — TELEPHONE (OUTPATIENT)
Dept: FAMILY MEDICINE CLINIC | Facility: CLINIC | Age: 38
End: 2023-04-13

## 2023-04-13 DIAGNOSIS — E78.5 DYSLIPIDEMIA: Primary | ICD-10-CM

## 2023-04-13 DIAGNOSIS — R05.9 COUGH, UNSPECIFIED TYPE: ICD-10-CM

## 2023-04-13 RX ORDER — DEXTROMETHORPHAN HYDROBROMIDE AND PROMETHAZINE HYDROCHLORIDE 15; 6.25 MG/5ML; MG/5ML
5 SYRUP ORAL 2 TIMES DAILY PRN
Qty: 120 ML | Refills: 0 | Status: SHIPPED | OUTPATIENT
Start: 2023-04-13

## 2023-04-13 RX ORDER — ROSUVASTATIN CALCIUM 20 MG/1
20 TABLET, COATED ORAL NIGHTLY
Qty: 30 TABLET | Refills: 5 | Status: SHIPPED | OUTPATIENT
Start: 2023-04-13

## 2023-04-13 RX ORDER — GABAPENTIN 300 MG/1
300 CAPSULE ORAL NIGHTLY PRN
Qty: 30 CAPSULE | Refills: 0 | Status: SHIPPED | OUTPATIENT
Start: 2023-04-13 | End: 2023-05-24 | Stop reason: SDUPTHER

## 2023-04-13 NOTE — TELEPHONE ENCOUNTER
Caller: Natalia Arzate    Relationship: Self    Best call back number:    [phone]          Requested Prescriptions:   Requested Prescriptions      No prescriptions requested or ordered in this encounter      [facility]  Pharmacy where request should be sent: [address] [phone] [facility] [phone] FX     Last office visit with prescribing clinician: 3/28/2023   Last telemedicine visit with prescribing clinician: 4/25/2023   Next office visit with prescribing clinician: 4/25/2023     Does the patient have less than a 3 day supply:  [x] Yes  [] No    Would you like a call back once the refill request has been completed: [x] Yes [] No    If the office needs to give you a call back, can they leave a voicemail: [x] Yes [] No    Juan Castro Rep   04/13/23 10:21 EDT

## 2023-05-01 ENCOUNTER — HOSPITAL ENCOUNTER (EMERGENCY)
Facility: HOSPITAL | Age: 38
Discharge: HOME | End: 2023-05-01
Attending: STUDENT IN AN ORGANIZED HEALTH CARE EDUCATION/TRAINING PROGRAM
Payer: COMMERCIAL

## 2023-05-01 VITALS
TEMPERATURE: 97.3 F | SYSTOLIC BLOOD PRESSURE: 136 MMHG | HEART RATE: 93 BPM | DIASTOLIC BLOOD PRESSURE: 91 MMHG | WEIGHT: 275 LBS | RESPIRATION RATE: 20 BRPM | OXYGEN SATURATION: 94 % | HEIGHT: 64 IN | BODY MASS INDEX: 46.95 KG/M2

## 2023-05-01 DIAGNOSIS — N39.0 ACUTE UTI: Primary | ICD-10-CM

## 2023-05-01 LAB
ALBUMIN SERPL-MCNC: 4.1 G/DL (ref 3.5–5.2)
ALBUMIN/GLOB SERPL: 0.9 G/DL
ALP SERPL-CCNC: 113 U/L (ref 39–117)
ALT SERPL W P-5'-P-CCNC: 26 U/L (ref 1–33)
ANION GAP SERPL CALCULATED.3IONS-SCNC: 17.5 MMOL/L (ref 5–15)
AST SERPL-CCNC: 33 U/L (ref 1–32)
BACTERIA UR QL AUTO: ABNORMAL /HPF
BASOPHILS # BLD AUTO: 0.04 10*3/MM3 (ref 0–0.2)
BASOPHILS NFR BLD AUTO: 0.7 % (ref 0–1.5)
BILIRUB SERPL-MCNC: 0.4 MG/DL (ref 0–1.2)
BILIRUB UR QL STRIP: NEGATIVE
BUN SERPL-MCNC: 14 MG/DL (ref 6–20)
BUN/CREAT SERPL: 19.7 (ref 7–25)
CALCIUM SPEC-SCNC: 10.2 MG/DL (ref 8.6–10.5)
CHLORIDE SERPL-SCNC: 96 MMOL/L (ref 98–107)
CLARITY UR: ABNORMAL
CO2 SERPL-SCNC: 16.5 MMOL/L (ref 22–29)
COLOR UR: ABNORMAL
CREAT SERPL-MCNC: 0.71 MG/DL (ref 0.57–1)
DEPRECATED RDW RBC AUTO: 51.9 FL (ref 37–54)
EGFRCR SERPLBLD CKD-EPI 2021: 112.5 ML/MIN/1.73
EOSINOPHIL # BLD AUTO: 0.18 10*3/MM3 (ref 0–0.4)
EOSINOPHIL NFR BLD AUTO: 3.1 % (ref 0.3–6.2)
ERYTHROCYTE [DISTWIDTH] IN BLOOD BY AUTOMATED COUNT: 15.2 % (ref 12.3–15.4)
GLOBULIN UR ELPH-MCNC: 4.5 GM/DL
GLUCOSE SERPL-MCNC: 204 MG/DL (ref 65–99)
GLUCOSE UR STRIP-MCNC: NEGATIVE MG/DL
HCT VFR BLD AUTO: 38.5 % (ref 34–46.6)
HGB BLD-MCNC: 12.6 G/DL (ref 12–15.9)
HGB UR QL STRIP.AUTO: ABNORMAL
HOLD SPECIMEN: NORMAL
HYALINE CASTS UR QL AUTO: ABNORMAL /LPF
IMM GRANULOCYTES # BLD AUTO: 0.02 10*3/MM3 (ref 0–0.05)
IMM GRANULOCYTES NFR BLD AUTO: 0.3 % (ref 0–0.5)
KETONES UR QL STRIP: NEGATIVE
LEUKOCYTE ESTERASE UR QL STRIP.AUTO: ABNORMAL
LIPASE SERPL-CCNC: 27 U/L (ref 13–60)
LYMPHOCYTES # BLD AUTO: 1.79 10*3/MM3 (ref 0.7–3.1)
LYMPHOCYTES NFR BLD AUTO: 30.4 % (ref 19.6–45.3)
MCH RBC QN AUTO: 30.6 PG (ref 26.6–33)
MCHC RBC AUTO-ENTMCNC: 32.7 G/DL (ref 31.5–35.7)
MCV RBC AUTO: 93.4 FL (ref 79–97)
MONOCYTES # BLD AUTO: 0.46 10*3/MM3 (ref 0.1–0.9)
MONOCYTES NFR BLD AUTO: 7.8 % (ref 5–12)
NEUTROPHILS NFR BLD AUTO: 3.39 10*3/MM3 (ref 1.7–7)
NEUTROPHILS NFR BLD AUTO: 57.7 % (ref 42.7–76)
NITRITE UR QL STRIP: NEGATIVE
NRBC BLD AUTO-RTO: 0 /100 WBC (ref 0–0.2)
PH UR STRIP.AUTO: <=5 [PH] (ref 5–8)
PLATELET # BLD AUTO: 280 10*3/MM3 (ref 140–450)
PMV BLD AUTO: 8.4 FL (ref 6–12)
POTASSIUM SERPL-SCNC: 4 MMOL/L (ref 3.5–5.2)
PROT SERPL-MCNC: 8.6 G/DL (ref 6–8.5)
PROT UR QL STRIP: ABNORMAL
RBC # BLD AUTO: 4.12 10*6/MM3 (ref 3.77–5.28)
RBC # UR STRIP: ABNORMAL /HPF
REF LAB TEST METHOD: ABNORMAL
SODIUM SERPL-SCNC: 130 MMOL/L (ref 136–145)
SP GR UR STRIP: 1.03 (ref 1–1.03)
SQUAMOUS #/AREA URNS HPF: ABNORMAL /HPF
UROBILINOGEN UR QL STRIP: ABNORMAL
WBC # UR STRIP: ABNORMAL /HPF
WBC NRBC COR # BLD: 5.88 10*3/MM3 (ref 3.4–10.8)
WHOLE BLOOD HOLD SPECIMEN: NORMAL

## 2023-05-01 PROCEDURE — 25010000002 HYDROMORPHONE 1 MG/ML SOLUTION: Performed by: NURSE PRACTITIONER

## 2023-05-01 PROCEDURE — 96375 TX/PRO/DX INJ NEW DRUG ADDON: CPT

## 2023-05-01 PROCEDURE — 96376 TX/PRO/DX INJ SAME DRUG ADON: CPT

## 2023-05-01 PROCEDURE — 96374 THER/PROPH/DIAG INJ IV PUSH: CPT

## 2023-05-01 PROCEDURE — 83690 ASSAY OF LIPASE: CPT | Performed by: PHYSICIAN ASSISTANT

## 2023-05-01 PROCEDURE — 25010000002 ONDANSETRON PER 1 MG: Performed by: NURSE PRACTITIONER

## 2023-05-01 PROCEDURE — 36415 COLL VENOUS BLD VENIPUNCTURE: CPT

## 2023-05-01 PROCEDURE — 99284 EMERGENCY DEPT VISIT MOD MDM: CPT

## 2023-05-01 PROCEDURE — 25010000002 MORPHINE PER 10 MG: Performed by: NURSE PRACTITIONER

## 2023-05-01 PROCEDURE — 81001 URINALYSIS AUTO W/SCOPE: CPT | Performed by: PHYSICIAN ASSISTANT

## 2023-05-01 PROCEDURE — 80053 COMPREHEN METABOLIC PANEL: CPT | Performed by: PHYSICIAN ASSISTANT

## 2023-05-01 PROCEDURE — 85025 COMPLETE CBC W/AUTO DIFF WBC: CPT | Performed by: PHYSICIAN ASSISTANT

## 2023-05-01 PROCEDURE — 96361 HYDRATE IV INFUSION ADD-ON: CPT

## 2023-05-01 RX ORDER — ONDANSETRON 2 MG/ML
4 INJECTION INTRAMUSCULAR; INTRAVENOUS ONCE
Status: COMPLETED | OUTPATIENT
Start: 2023-05-01 | End: 2023-05-01

## 2023-05-01 RX ORDER — CEFDINIR 300 MG/1
300 CAPSULE ORAL 2 TIMES DAILY
Qty: 20 CAPSULE | Refills: 0 | Status: SHIPPED | OUTPATIENT
Start: 2023-05-01 | End: 2023-05-11

## 2023-05-01 RX ADMIN — MORPHINE SULFATE 4 MG: 4 INJECTION, SOLUTION INTRAMUSCULAR; INTRAVENOUS at 15:38

## 2023-05-01 RX ADMIN — ONDANSETRON 4 MG: 2 INJECTION INTRAMUSCULAR; INTRAVENOUS at 17:58

## 2023-05-01 RX ADMIN — HYDROMORPHONE HYDROCHLORIDE 1 MG: 1 INJECTION, SOLUTION INTRAMUSCULAR; INTRAVENOUS; SUBCUTANEOUS at 17:37

## 2023-05-01 RX ADMIN — ONDANSETRON 4 MG: 2 INJECTION INTRAMUSCULAR; INTRAVENOUS at 15:38

## 2023-05-01 RX ADMIN — SODIUM CHLORIDE 1000 ML: 9 INJECTION, SOLUTION INTRAVENOUS at 15:38

## 2023-05-01 NOTE — ED PROVIDER NOTES
"Subjective   History of Present Illness  She is 38-year-old female who complains of right flank pain and nausea.  Patient reports that she was recently admitted to [facility] for a infection in her left flank area.  Patient reports that she was told he had she did have a stone in her right kidney but it was not in the ureter so should not cause the pain.  Patient reports now she feels that stomach is moving because she is having superior\" flank pain and vomiting.  Patient denies fever.  Patient denies chest pain or shortness of breath.  Patient reports pain as moderate to severe.    Flank Pain      Review of Systems   Constitutional: Negative.    HENT: Negative.    Eyes: Negative.    Respiratory: Negative.    Cardiovascular: Negative.    Gastrointestinal: Negative.    Endocrine: Negative.    Genitourinary: Positive for flank pain.   Skin: Negative.    Allergic/Immunologic: Negative.    Neurological: Negative.    Hematological: Negative.    Psychiatric/Behavioral: Negative.        Past Medical History:   Diagnosis Date   • A-fib    • Abnormal ECG    • Anemia    • Anxiety    • Asthma    • Cancer     Ovarian   • Depression    • Diabetes mellitus    • DVT (deep venous thrombosis)    • Factor 5 Leiden mutation, heterozygous    • Fibroid    • GERD (gastroesophageal reflux disease)    • Gout    • H/O abdominal abscess    • History of sepsis    • History of transfusion    • Hyperlipidemia    • Hypothyroid    • Kidney stone    • Migraines    • Neuropathy    • Ovarian cancer 02/2021   • Ovarian cyst    • PE (pulmonary embolism)    • Polycystic ovary syndrome    • Preeclampsia    • Rh incompatibility    • Stroke    • TIA (transient ischemic attack)    • Urinary tract infection    • Varicella        Allergies   Allergen Reactions   • Toradol [Ketorolac Tromethamine] Anaphylaxis and Hives   • Haldol [Haloperidol] Hives and Mental Status Change   • Tramadol Hives and Swelling   • Amoxicillin Hives and Rash   • " Penicillins Hives and Rash       Past Surgical History:   Procedure Laterality Date   • ABDOMINAL SURGERY     • CARDIAC CATHETERIZATION     •  SECTION     • CHOLECYSTECTOMY     • COLONOSCOPY     • ENDOSCOPY     • EXTRACORPOREAL SHOCK WAVE LITHOTRIPSY (ESWL) Left 10/22/2021    Procedure: EXTRACORPOREAL SHOCKWAVE LITHOTRIPSY;  Surgeon: Milan Motley MD;  Location: [facility];  Service: Urology;  Laterality: Left;   • LITHOTRIPSY     • RIGHT OOPHORECTOMY     • URETEROSCOPY LASER LITHOTRIPSY WITH STENT INSERTION Left 10/01/2021    Procedure: URETEROSCOPY WITH STENT PLACEMENT;  Surgeon: Milan Motley MD;  Location: [facility];  Service: Urology;  Laterality: Left;   • URETEROSCOPY LASER LITHOTRIPSY WITH STENT INSERTION Left 2022    Procedure: URETEROSCOPY STENT REMOVAL;  Surgeon: Milan Motley MD;  Location: [facility];  Service: Urology;  Laterality: Left;   • URETEROSCOPY LASER LITHOTRIPSY WITH STENT INSERTION Left 2022    Procedure: CYSTOSCOPY RETROGRADE LEFT WITH STENT PLACEMENT;  Surgeon: Milan Motley MD;  Location: [facility];  Service: Urology;  Laterality: Left;       Family History   Problem Relation Age of Onset   • Hypertension Mother    • Diabetes Father    • Hypertension Father    • Heart attack Father    • No Known Problems Sister    • No Known Problems Brother    • No Known Problems Son    • No Known Problems Daughter    • No Known Problems Maternal Grandmother    • No Known Problems Maternal Grandfather    • No Known Problems Paternal Grandmother    • No Known Problems Paternal Grandfather    • No Known Problems Cousin    • Rheum arthritis Neg Hx    • Osteoarthritis Neg Hx    • Asthma Neg Hx    • Heart failure Neg Hx    • Hyperlipidemia Neg Hx    • Migraines Neg Hx    • Rashes / Skin problems Neg Hx    • Seizures Neg Hx    • Stroke Neg Hx    • Thyroid disease Neg Hx        Social History     Socioeconomic History   • Marital status:    Tobacco  "Use   • Smoking status: Never     Passive exposure: Never   • Smokeless tobacco: Never   Vaping Use   • Vaping Use: Never used   Substance and Sexual Activity   • Alcohol use: No   • Drug use: Never     Comment: Pt has track marks on right arm. Pt states \"It's from my INR being drawn.\"    • Sexual activity: Not Currently     Partners: Male     Birth control/protection: None           Objective   Physical Exam  Vitals and nursing note reviewed.   Constitutional:       Appearance: She is well-developed.   HENT:      Head: Normocephalic.      Right Ear: External ear normal.      Left Ear: External ear normal.   Eyes:      Conjunctiva/sclera: Conjunctivae normal.      Pupils: Pupils are equal, round, and reactive to light.   Cardiovascular:      Rate and Rhythm: Normal rate and regular rhythm.      Heart sounds: Normal heart sounds.   Pulmonary:      Effort: Pulmonary effort is normal.      Breath sounds: Normal breath sounds.   Abdominal:      General: Bowel sounds are normal.      Palpations: Abdomen is soft.   Musculoskeletal:         General: Normal range of motion.      Cervical back: Normal range of motion and neck supple.   Skin:     General: Skin is warm and dry.      Capillary Refill: Capillary refill takes less than 2 seconds.   Neurological:      Mental Status: She is alert and oriented to person, place, and time.   Psychiatric:         Behavior: Behavior normal.         Thought Content: Thought content normal.         Procedures           ED Course                                           Medical Decision Making  She is 38-year-old female who complains of right flank pain and nausea.  Patient reports that she was recently admitted to [facility] for a infection in her left flank area.  Patient reports that she was told he had she did have a stone in her right kidney but it was not in the ureter so should not cause the pain.  Patient reports now she feels that stomach is moving because she is having " "superior\" flank pain and vomiting.  Patient denies fever.  Patient denies chest pain or shortness of breath.  Patient reports pain as moderate to severe.    Acute UTI: acute illness or injury  Amount and/or Complexity of Data Reviewed  Labs: ordered. Decision-making details documented in ED Course.      Risk  Prescription drug management.          Final diagnoses:   None       ED Disposition  ED Disposition     None          No follow-up provider specified.       Medication List      No changes were made to your prescriptions during this visit.       "

## 2023-05-01 NOTE — ED NOTES
MEDICAL SCREENING:    Reason for Visit: flank pain      Patient initially seen in triage.  The patient was advised further evaluation and diagnostic testing will be needed, some of the treatment and testing will be initiated in the lobby in order to begin the process.  The patient will be returned to the waiting area for the time being and possibly be re-assessed by a subsequent ED provider.  The patient will be brought back to the treatment area in as timely manner as possible.         Georgie Mckenzie PA  05/01/23 [id number]

## 2023-05-05 ENCOUNTER — APPOINTMENT (OUTPATIENT)
Dept: GENERAL RADIOLOGY | Facility: HOSPITAL | Age: 38
End: 2023-05-05
Payer: COMMERCIAL

## 2023-05-05 ENCOUNTER — APPOINTMENT (OUTPATIENT)
Dept: CT IMAGING | Facility: HOSPITAL | Age: 38
End: 2023-05-05
Payer: COMMERCIAL

## 2023-05-05 ENCOUNTER — HOSPITAL ENCOUNTER (EMERGENCY)
Facility: HOSPITAL | Age: 38
Discharge: LEFT BEFORE DISCHARGE | End: 2023-05-05
Attending: EMERGENCY MEDICINE
Payer: COMMERCIAL

## 2023-05-05 VITALS
WEIGHT: 275 LBS | HEART RATE: 105 BPM | SYSTOLIC BLOOD PRESSURE: 161 MMHG | HEIGHT: 64 IN | RESPIRATION RATE: 19 BRPM | OXYGEN SATURATION: 97 % | DIASTOLIC BLOOD PRESSURE: 102 MMHG | BODY MASS INDEX: 46.95 KG/M2 | TEMPERATURE: 98.2 F

## 2023-05-05 DIAGNOSIS — R10.9 ABDOMINAL PAIN, UNSPECIFIED ABDOMINAL LOCATION: Primary | ICD-10-CM

## 2023-05-05 LAB
ALBUMIN SERPL-MCNC: 4.2 G/DL (ref 3.5–5.2)
ALBUMIN/GLOB SERPL: 1.1 G/DL
ALP SERPL-CCNC: 108 U/L (ref 39–117)
ALT SERPL W P-5'-P-CCNC: 26 U/L (ref 1–33)
ANION GAP SERPL CALCULATED.3IONS-SCNC: 15.5 MMOL/L (ref 5–15)
AST SERPL-CCNC: 24 U/L (ref 1–32)
BASOPHILS # BLD AUTO: 0.04 10*3/MM3 (ref 0–0.2)
BASOPHILS NFR BLD AUTO: 0.7 % (ref 0–1.5)
BILIRUB SERPL-MCNC: 0.3 MG/DL (ref 0–1.2)
BUN SERPL-MCNC: 14 MG/DL (ref 6–20)
BUN/CREAT SERPL: 19.2 (ref 7–25)
CALCIUM SPEC-SCNC: 9.9 MG/DL (ref 8.6–10.5)
CHLORIDE SERPL-SCNC: 100 MMOL/L (ref 98–107)
CO2 SERPL-SCNC: 21.5 MMOL/L (ref 22–29)
CREAT SERPL-MCNC: 0.73 MG/DL (ref 0.57–1)
CRP SERPL-MCNC: 1.45 MG/DL (ref 0–0.5)
D-LACTATE SERPL-SCNC: 5 MMOL/L (ref 0.5–2)
DEPRECATED RDW RBC AUTO: 49.3 FL (ref 37–54)
EGFRCR SERPLBLD CKD-EPI 2021: 108.8 ML/MIN/1.73
EOSINOPHIL # BLD AUTO: 0.18 10*3/MM3 (ref 0–0.4)
EOSINOPHIL NFR BLD AUTO: 3.4 % (ref 0.3–6.2)
ERYTHROCYTE [DISTWIDTH] IN BLOOD BY AUTOMATED COUNT: 14.6 % (ref 12.3–15.4)
ERYTHROCYTE [SEDIMENTATION RATE] IN BLOOD: 37 MM/HR (ref 0–20)
GLOBULIN UR ELPH-MCNC: 4 GM/DL
GLUCOSE SERPL-MCNC: 258 MG/DL (ref 65–99)
HCT VFR BLD AUTO: 36.7 % (ref 34–46.6)
HGB BLD-MCNC: 12.3 G/DL (ref 12–15.9)
HOLD SPECIMEN: NORMAL
HOLD SPECIMEN: NORMAL
IMM GRANULOCYTES # BLD AUTO: 0.01 10*3/MM3 (ref 0–0.05)
IMM GRANULOCYTES NFR BLD AUTO: 0.2 % (ref 0–0.5)
LIPASE SERPL-CCNC: 42 U/L (ref 13–60)
LYMPHOCYTES # BLD AUTO: 1.9 10*3/MM3 (ref 0.7–3.1)
LYMPHOCYTES NFR BLD AUTO: 35.4 % (ref 19.6–45.3)
MCH RBC QN AUTO: 31.3 PG (ref 26.6–33)
MCHC RBC AUTO-ENTMCNC: 33.5 G/DL (ref 31.5–35.7)
MCV RBC AUTO: 93.4 FL (ref 79–97)
MONOCYTES # BLD AUTO: 0.5 10*3/MM3 (ref 0.1–0.9)
MONOCYTES NFR BLD AUTO: 9.3 % (ref 5–12)
NEUTROPHILS NFR BLD AUTO: 2.73 10*3/MM3 (ref 1.7–7)
NEUTROPHILS NFR BLD AUTO: 51 % (ref 42.7–76)
NRBC BLD AUTO-RTO: 0 /100 WBC (ref 0–0.2)
NT-PROBNP SERPL-MCNC: <36 PG/ML (ref 0–450)
PLATELET # BLD AUTO: 240 10*3/MM3 (ref 140–450)
PMV BLD AUTO: 8.5 FL (ref 6–12)
POTASSIUM SERPL-SCNC: 4.1 MMOL/L (ref 3.5–5.2)
PROT SERPL-MCNC: 8.2 G/DL (ref 6–8.5)
RBC # BLD AUTO: 3.93 10*6/MM3 (ref 3.77–5.28)
SODIUM SERPL-SCNC: 137 MMOL/L (ref 136–145)
WBC NRBC COR # BLD: 5.36 10*3/MM3 (ref 3.4–10.8)
WHOLE BLOOD HOLD COAG: NORMAL
WHOLE BLOOD HOLD SPECIMEN: NORMAL

## 2023-05-05 PROCEDURE — 96375 TX/PRO/DX INJ NEW DRUG ADDON: CPT

## 2023-05-05 PROCEDURE — 99283 EMERGENCY DEPT VISIT LOW MDM: CPT

## 2023-05-05 PROCEDURE — 25010000002 ORPHENADRINE CITRATE PER 60 MG: Performed by: NURSE PRACTITIONER

## 2023-05-05 PROCEDURE — 80053 COMPREHEN METABOLIC PANEL: CPT | Performed by: NURSE PRACTITIONER

## 2023-05-05 PROCEDURE — 87040 BLOOD CULTURE FOR BACTERIA: CPT | Performed by: NURSE PRACTITIONER

## 2023-05-05 PROCEDURE — 74018 RADEX ABDOMEN 1 VIEW: CPT

## 2023-05-05 PROCEDURE — 85652 RBC SED RATE AUTOMATED: CPT | Performed by: NURSE PRACTITIONER

## 2023-05-05 PROCEDURE — 96365 THER/PROPH/DIAG IV INF INIT: CPT

## 2023-05-05 PROCEDURE — 74018 RADEX ABDOMEN 1 VIEW: CPT | Performed by: RADIOLOGY

## 2023-05-05 PROCEDURE — 86140 C-REACTIVE PROTEIN: CPT | Performed by: NURSE PRACTITIONER

## 2023-05-05 PROCEDURE — 74177 CT ABD & PELVIS W/CONTRAST: CPT

## 2023-05-05 PROCEDURE — 83880 ASSAY OF NATRIURETIC PEPTIDE: CPT | Performed by: NURSE PRACTITIONER

## 2023-05-05 PROCEDURE — 83690 ASSAY OF LIPASE: CPT | Performed by: NURSE PRACTITIONER

## 2023-05-05 PROCEDURE — 83605 ASSAY OF LACTIC ACID: CPT | Performed by: NURSE PRACTITIONER

## 2023-05-05 PROCEDURE — 85025 COMPLETE CBC W/AUTO DIFF WBC: CPT | Performed by: NURSE PRACTITIONER

## 2023-05-05 PROCEDURE — 25510000001 IOPAMIDOL 61 % SOLUTION: Performed by: EMERGENCY MEDICINE

## 2023-05-05 PROCEDURE — 36415 COLL VENOUS BLD VENIPUNCTURE: CPT

## 2023-05-05 PROCEDURE — 74177 CT ABD & PELVIS W/CONTRAST: CPT | Performed by: RADIOLOGY

## 2023-05-05 RX ORDER — SODIUM CHLORIDE 0.9 % (FLUSH) 0.9 %
10 SYRINGE (ML) INJECTION AS NEEDED
Status: DISCONTINUED | OUTPATIENT
Start: 2023-05-05 | End: 2023-05-05 | Stop reason: HOSPADM

## 2023-05-05 RX ORDER — ORPHENADRINE CITRATE 30 MG/ML
60 INJECTION INTRAMUSCULAR; INTRAVENOUS ONCE
Status: COMPLETED | OUTPATIENT
Start: 2023-05-05 | End: 2023-05-05

## 2023-05-05 RX ORDER — ACETAMINOPHEN 500 MG
1000 TABLET ORAL ONCE
Status: DISCONTINUED | OUTPATIENT
Start: 2023-05-05 | End: 2023-05-05

## 2023-05-05 RX ORDER — METRONIDAZOLE 500 MG/100ML
500 INJECTION, SOLUTION INTRAVENOUS ONCE
Status: DISCONTINUED | OUTPATIENT
Start: 2023-05-05 | End: 2023-05-05

## 2023-05-05 RX ADMIN — ORPHENADRINE CITRATE 60 MG: 60 INJECTION INTRAMUSCULAR; INTRAVENOUS at 07:06

## 2023-05-05 RX ADMIN — IOPAMIDOL 80 ML: 612 INJECTION, SOLUTION INTRAVENOUS at 06:43

## 2023-05-05 RX ADMIN — SODIUM CHLORIDE 2 G: 9 INJECTION, SOLUTION INTRAVENOUS at 06:57

## 2023-05-05 RX ADMIN — SODIUM CHLORIDE 1000 ML: 9 INJECTION, SOLUTION INTRAVENOUS at 06:44

## 2023-05-10 LAB
BACTERIA SPEC AEROBE CULT: NORMAL
BACTERIA SPEC AEROBE CULT: NORMAL

## 2023-05-11 ENCOUNTER — HOSPITAL ENCOUNTER (EMERGENCY)
Facility: HOSPITAL | Age: 38
Discharge: HOME | End: 2023-05-11
Attending: STUDENT IN AN ORGANIZED HEALTH CARE EDUCATION/TRAINING PROGRAM
Payer: COMMERCIAL

## 2023-05-11 ENCOUNTER — APPOINTMENT (OUTPATIENT)
Dept: CT IMAGING | Facility: HOSPITAL | Age: 38
End: 2023-05-11
Payer: COMMERCIAL

## 2023-05-11 VITALS
RESPIRATION RATE: 16 BRPM | SYSTOLIC BLOOD PRESSURE: 161 MMHG | DIASTOLIC BLOOD PRESSURE: 96 MMHG | WEIGHT: 280 LBS | HEIGHT: 64 IN | BODY MASS INDEX: 47.8 KG/M2 | OXYGEN SATURATION: 98 % | HEART RATE: 104 BPM | TEMPERATURE: 97.6 F

## 2023-05-11 DIAGNOSIS — R10.30 LOWER ABDOMINAL PAIN: Primary | ICD-10-CM

## 2023-05-11 LAB
ALBUMIN SERPL-MCNC: 4.1 G/DL (ref 3.5–5.2)
ALBUMIN/GLOB SERPL: 1.1 G/DL
ALP SERPL-CCNC: 112 U/L (ref 39–117)
ALT SERPL W P-5'-P-CCNC: 39 U/L (ref 1–33)
ANION GAP SERPL CALCULATED.3IONS-SCNC: 17.8 MMOL/L (ref 5–15)
AST SERPL-CCNC: 36 U/L (ref 1–32)
BACTERIA UR QL AUTO: ABNORMAL /HPF
BASOPHILS # BLD AUTO: 0.03 10*3/MM3 (ref 0–0.2)
BASOPHILS NFR BLD AUTO: 0.6 % (ref 0–1.5)
BILIRUB SERPL-MCNC: 0.3 MG/DL (ref 0–1.2)
BILIRUB UR QL STRIP: NEGATIVE
BUN SERPL-MCNC: 16 MG/DL (ref 6–20)
BUN/CREAT SERPL: 26.7 (ref 7–25)
CALCIUM SPEC-SCNC: 9.9 MG/DL (ref 8.6–10.5)
CHLORIDE SERPL-SCNC: 101 MMOL/L (ref 98–107)
CLARITY UR: CLEAR
CO2 SERPL-SCNC: 19.2 MMOL/L (ref 22–29)
COLOR UR: YELLOW
CREAT SERPL-MCNC: 0.6 MG/DL (ref 0.57–1)
CRP SERPL-MCNC: 1.38 MG/DL (ref 0–0.5)
DEPRECATED RDW RBC AUTO: 47.5 FL (ref 37–54)
EGFRCR SERPLBLD CKD-EPI 2021: 118.7 ML/MIN/1.73
EOSINOPHIL # BLD AUTO: 0.15 10*3/MM3 (ref 0–0.4)
EOSINOPHIL NFR BLD AUTO: 3 % (ref 0.3–6.2)
ERYTHROCYTE [DISTWIDTH] IN BLOOD BY AUTOMATED COUNT: 14.3 % (ref 12.3–15.4)
GLOBULIN UR ELPH-MCNC: 3.8 GM/DL
GLUCOSE SERPL-MCNC: 218 MG/DL (ref 65–99)
GLUCOSE UR STRIP-MCNC: ABNORMAL MG/DL
HCT VFR BLD AUTO: 35.3 % (ref 34–46.6)
HGB BLD-MCNC: 11.9 G/DL (ref 12–15.9)
HGB UR QL STRIP.AUTO: NEGATIVE
HOLD SPECIMEN: NORMAL
HOLD SPECIMEN: NORMAL
HYALINE CASTS UR QL AUTO: ABNORMAL /LPF
IMM GRANULOCYTES # BLD AUTO: 0.01 10*3/MM3 (ref 0–0.05)
IMM GRANULOCYTES NFR BLD AUTO: 0.2 % (ref 0–0.5)
KETONES UR QL STRIP: NEGATIVE
LEUKOCYTE ESTERASE UR QL STRIP.AUTO: ABNORMAL
LIPASE SERPL-CCNC: 30 U/L (ref 13–60)
LYMPHOCYTES # BLD AUTO: 1.4 10*3/MM3 (ref 0.7–3.1)
LYMPHOCYTES NFR BLD AUTO: 27.8 % (ref 19.6–45.3)
MCH RBC QN AUTO: 31 PG (ref 26.6–33)
MCHC RBC AUTO-ENTMCNC: 33.7 G/DL (ref 31.5–35.7)
MCV RBC AUTO: 91.9 FL (ref 79–97)
MONOCYTES # BLD AUTO: 0.37 10*3/MM3 (ref 0.1–0.9)
MONOCYTES NFR BLD AUTO: 7.3 % (ref 5–12)
NEUTROPHILS NFR BLD AUTO: 3.08 10*3/MM3 (ref 1.7–7)
NEUTROPHILS NFR BLD AUTO: 61.1 % (ref 42.7–76)
NITRITE UR QL STRIP: NEGATIVE
NRBC BLD AUTO-RTO: 0 /100 WBC (ref 0–0.2)
PH UR STRIP.AUTO: 5.5 [PH] (ref 5–8)
PLATELET # BLD AUTO: 217 10*3/MM3 (ref 140–450)
PMV BLD AUTO: 9 FL (ref 6–12)
POTASSIUM SERPL-SCNC: 4.5 MMOL/L (ref 3.5–5.2)
PROT SERPL-MCNC: 7.9 G/DL (ref 6–8.5)
PROT UR QL STRIP: ABNORMAL
RBC # BLD AUTO: 3.84 10*6/MM3 (ref 3.77–5.28)
RBC # UR STRIP: ABNORMAL /HPF
REF LAB TEST METHOD: ABNORMAL
SODIUM SERPL-SCNC: 138 MMOL/L (ref 136–145)
SP GR UR STRIP: 1.02 (ref 1–1.03)
SQUAMOUS #/AREA URNS HPF: ABNORMAL /HPF
UROBILINOGEN UR QL STRIP: ABNORMAL
WBC # UR STRIP: ABNORMAL /HPF
WBC NRBC COR # BLD: 5.04 10*3/MM3 (ref 3.4–10.8)
WHOLE BLOOD HOLD COAG: NORMAL
WHOLE BLOOD HOLD SPECIMEN: NORMAL

## 2023-05-11 PROCEDURE — 99284 EMERGENCY DEPT VISIT MOD MDM: CPT

## 2023-05-11 PROCEDURE — 87086 URINE CULTURE/COLONY COUNT: CPT | Performed by: NURSE PRACTITIONER

## 2023-05-11 PROCEDURE — 85025 COMPLETE CBC W/AUTO DIFF WBC: CPT | Performed by: NURSE PRACTITIONER

## 2023-05-11 PROCEDURE — 86140 C-REACTIVE PROTEIN: CPT | Performed by: NURSE PRACTITIONER

## 2023-05-11 PROCEDURE — 74176 CT ABD & PELVIS W/O CONTRAST: CPT | Performed by: RADIOLOGY

## 2023-05-11 PROCEDURE — 25010000002 PROCHLORPERAZINE 10 MG/2ML SOLUTION: Performed by: NURSE PRACTITIONER

## 2023-05-11 PROCEDURE — 74176 CT ABD & PELVIS W/O CONTRAST: CPT

## 2023-05-11 PROCEDURE — 36415 COLL VENOUS BLD VENIPUNCTURE: CPT

## 2023-05-11 PROCEDURE — 96372 THER/PROPH/DIAG INJ SC/IM: CPT

## 2023-05-11 PROCEDURE — 81001 URINALYSIS AUTO W/SCOPE: CPT | Performed by: NURSE PRACTITIONER

## 2023-05-11 PROCEDURE — 80053 COMPREHEN METABOLIC PANEL: CPT | Performed by: NURSE PRACTITIONER

## 2023-05-11 PROCEDURE — 83690 ASSAY OF LIPASE: CPT | Performed by: NURSE PRACTITIONER

## 2023-05-11 RX ORDER — HYDROCODONE BITARTRATE AND ACETAMINOPHEN 5; 325 MG/1; MG/1
1 TABLET ORAL ONCE
Status: COMPLETED | OUTPATIENT
Start: 2023-05-11 | End: 2023-05-11

## 2023-05-11 RX ORDER — PROCHLORPERAZINE EDISYLATE 5 MG/ML
10 INJECTION INTRAMUSCULAR; INTRAVENOUS ONCE
Status: COMPLETED | OUTPATIENT
Start: 2023-05-11 | End: 2023-05-11

## 2023-05-11 RX ADMIN — PROCHLORPERAZINE EDISYLATE 10 MG: 5 INJECTION INTRAMUSCULAR; INTRAVENOUS at 04:35

## 2023-05-11 RX ADMIN — HYDROCODONE BITARTRATE AND ACETAMINOPHEN 1 TABLET: 5; 325 TABLET ORAL at 04:35

## 2023-05-12 LAB — BACTERIA SPEC AEROBE CULT: NORMAL

## 2023-05-17 ENCOUNTER — HOSPITAL ENCOUNTER (EMERGENCY)
Facility: HOSPITAL | Age: 38
Discharge: HOME | End: 2023-05-17
Attending: STUDENT IN AN ORGANIZED HEALTH CARE EDUCATION/TRAINING PROGRAM | Admitting: STUDENT IN AN ORGANIZED HEALTH CARE EDUCATION/TRAINING PROGRAM
Payer: COMMERCIAL

## 2023-05-17 ENCOUNTER — APPOINTMENT (OUTPATIENT)
Dept: CT IMAGING | Facility: HOSPITAL | Age: 38
End: 2023-05-17
Payer: COMMERCIAL

## 2023-05-17 VITALS
WEIGHT: 275 LBS | BODY MASS INDEX: 46.95 KG/M2 | SYSTOLIC BLOOD PRESSURE: 160 MMHG | TEMPERATURE: 97.6 F | DIASTOLIC BLOOD PRESSURE: 71 MMHG | RESPIRATION RATE: 18 BRPM | HEART RATE: 85 BPM | OXYGEN SATURATION: 97 % | HEIGHT: 64 IN

## 2023-05-17 DIAGNOSIS — R10.84 GENERALIZED ABDOMINAL PAIN: Primary | ICD-10-CM

## 2023-05-17 LAB
ALBUMIN SERPL-MCNC: 4.2 G/DL (ref 3.5–5.2)
ALBUMIN/GLOB SERPL: 1.2 G/DL
ALP SERPL-CCNC: 108 U/L (ref 39–117)
ALT SERPL W P-5'-P-CCNC: 32 U/L (ref 1–33)
ANION GAP SERPL CALCULATED.3IONS-SCNC: 17.2 MMOL/L (ref 5–15)
AST SERPL-CCNC: 36 U/L (ref 1–32)
BASOPHILS # BLD AUTO: 0.03 10*3/MM3 (ref 0–0.2)
BASOPHILS NFR BLD AUTO: 0.6 % (ref 0–1.5)
BILIRUB SERPL-MCNC: 0.3 MG/DL (ref 0–1.2)
BUN SERPL-MCNC: 15 MG/DL (ref 6–20)
BUN/CREAT SERPL: 21.7 (ref 7–25)
CALCIUM SPEC-SCNC: 9.7 MG/DL (ref 8.6–10.5)
CHLORIDE SERPL-SCNC: 100 MMOL/L (ref 98–107)
CO2 SERPL-SCNC: 18.8 MMOL/L (ref 22–29)
CREAT SERPL-MCNC: 0.69 MG/DL (ref 0.57–1)
D-LACTATE SERPL-SCNC: 3.8 MMOL/L (ref 0.5–2)
D-LACTATE SERPL-SCNC: 4.7 MMOL/L (ref 0.5–2)
DEPRECATED RDW RBC AUTO: 46.9 FL (ref 37–54)
EGFRCR SERPLBLD CKD-EPI 2021: 114.8 ML/MIN/1.73
EOSINOPHIL # BLD AUTO: 0.12 10*3/MM3 (ref 0–0.4)
EOSINOPHIL NFR BLD AUTO: 2.2 % (ref 0.3–6.2)
ERYTHROCYTE [DISTWIDTH] IN BLOOD BY AUTOMATED COUNT: 14 % (ref 12.3–15.4)
GLOBULIN UR ELPH-MCNC: 3.6 GM/DL
GLUCOSE SERPL-MCNC: 250 MG/DL (ref 65–99)
HCT VFR BLD AUTO: 36.4 % (ref 34–46.6)
HGB BLD-MCNC: 12.5 G/DL (ref 12–15.9)
HOLD SPECIMEN: NORMAL
HOLD SPECIMEN: NORMAL
IMM GRANULOCYTES # BLD AUTO: 0.02 10*3/MM3 (ref 0–0.05)
IMM GRANULOCYTES NFR BLD AUTO: 0.4 % (ref 0–0.5)
LIPASE SERPL-CCNC: 29 U/L (ref 13–60)
LYMPHOCYTES # BLD AUTO: 1.46 10*3/MM3 (ref 0.7–3.1)
LYMPHOCYTES NFR BLD AUTO: 27.3 % (ref 19.6–45.3)
MCH RBC QN AUTO: 32 PG (ref 26.6–33)
MCHC RBC AUTO-ENTMCNC: 34.3 G/DL (ref 31.5–35.7)
MCV RBC AUTO: 93.1 FL (ref 79–97)
MONOCYTES # BLD AUTO: 0.45 10*3/MM3 (ref 0.1–0.9)
MONOCYTES NFR BLD AUTO: 8.4 % (ref 5–12)
NEUTROPHILS NFR BLD AUTO: 3.26 10*3/MM3 (ref 1.7–7)
NEUTROPHILS NFR BLD AUTO: 61.1 % (ref 42.7–76)
NRBC BLD AUTO-RTO: 0 /100 WBC (ref 0–0.2)
PLATELET # BLD AUTO: 234 10*3/MM3 (ref 140–450)
PMV BLD AUTO: 8.7 FL (ref 6–12)
POTASSIUM SERPL-SCNC: 4.1 MMOL/L (ref 3.5–5.2)
PROT SERPL-MCNC: 7.8 G/DL (ref 6–8.5)
RBC # BLD AUTO: 3.91 10*6/MM3 (ref 3.77–5.28)
SODIUM SERPL-SCNC: 136 MMOL/L (ref 136–145)
WBC NRBC COR # BLD: 5.34 10*3/MM3 (ref 3.4–10.8)
WHOLE BLOOD HOLD COAG: NORMAL
WHOLE BLOOD HOLD SPECIMEN: NORMAL

## 2023-05-17 PROCEDURE — 36415 COLL VENOUS BLD VENIPUNCTURE: CPT

## 2023-05-17 PROCEDURE — 99284 EMERGENCY DEPT VISIT MOD MDM: CPT

## 2023-05-17 PROCEDURE — 74177 CT ABD & PELVIS W/CONTRAST: CPT | Performed by: RADIOLOGY

## 2023-05-17 PROCEDURE — 96374 THER/PROPH/DIAG INJ IV PUSH: CPT

## 2023-05-17 PROCEDURE — 25010000002 DEXAMETHASONE SODIUM PHOSPHATE 10 MG/ML SOLUTION: Performed by: STUDENT IN AN ORGANIZED HEALTH CARE EDUCATION/TRAINING PROGRAM

## 2023-05-17 PROCEDURE — 80053 COMPREHEN METABOLIC PANEL: CPT | Performed by: STUDENT IN AN ORGANIZED HEALTH CARE EDUCATION/TRAINING PROGRAM

## 2023-05-17 PROCEDURE — 25510000001 IOPAMIDOL 61 % SOLUTION: Performed by: STUDENT IN AN ORGANIZED HEALTH CARE EDUCATION/TRAINING PROGRAM

## 2023-05-17 PROCEDURE — 85025 COMPLETE CBC W/AUTO DIFF WBC: CPT | Performed by: STUDENT IN AN ORGANIZED HEALTH CARE EDUCATION/TRAINING PROGRAM

## 2023-05-17 PROCEDURE — 83605 ASSAY OF LACTIC ACID: CPT | Performed by: STUDENT IN AN ORGANIZED HEALTH CARE EDUCATION/TRAINING PROGRAM

## 2023-05-17 PROCEDURE — 83690 ASSAY OF LIPASE: CPT | Performed by: STUDENT IN AN ORGANIZED HEALTH CARE EDUCATION/TRAINING PROGRAM

## 2023-05-17 PROCEDURE — 74177 CT ABD & PELVIS W/CONTRAST: CPT

## 2023-05-17 RX ORDER — DEXAMETHASONE SODIUM PHOSPHATE 10 MG/ML
5 INJECTION, SOLUTION INTRAMUSCULAR; INTRAVENOUS ONCE
Status: DISCONTINUED | OUTPATIENT
Start: 2023-05-17 | End: 2023-05-17

## 2023-05-17 RX ORDER — ACETAMINOPHEN 500 MG
1000 TABLET ORAL ONCE
Status: COMPLETED | OUTPATIENT
Start: 2023-05-17 | End: 2023-05-17

## 2023-05-17 RX ORDER — DEXAMETHASONE SODIUM PHOSPHATE 10 MG/ML
10 INJECTION, SOLUTION INTRAMUSCULAR; INTRAVENOUS ONCE
Status: COMPLETED | OUTPATIENT
Start: 2023-05-17 | End: 2023-05-17

## 2023-05-17 RX ORDER — SODIUM CHLORIDE 0.9 % (FLUSH) 0.9 %
10 SYRINGE (ML) INJECTION AS NEEDED
Status: DISCONTINUED | OUTPATIENT
Start: 2023-05-17 | End: 2023-05-17 | Stop reason: HOSPADM

## 2023-05-17 RX ADMIN — DEXAMETHASONE SODIUM PHOSPHATE 10 MG: 10 INJECTION INTRAMUSCULAR; INTRAVENOUS at 16:41

## 2023-05-17 RX ADMIN — SODIUM CHLORIDE 1000 ML: 9 INJECTION, SOLUTION INTRAVENOUS at 16:48

## 2023-05-17 RX ADMIN — IOPAMIDOL 86 ML: 612 INJECTION, SOLUTION INTRAVENOUS at 16:49

## 2023-05-17 RX ADMIN — ACETAMINOPHEN 1000 MG: 500 TABLET ORAL at 16:48

## 2023-05-17 NOTE — ED PROVIDER NOTES
Subjective   History of Present Illness  38-year-old female presents to the ER with a primary complaint of right-sided flank and abdominal pain.  Patient has a longstanding history of nephrolithiasis.  Patient also has a longstanding history of pain medication seeking behavior.  Patient has a history of several/repeat CT imaging.  Concerns for radiation exposure also.  Patient is symptoms been present for the last 2 to 3 days.  Confirmed associated intermittent nausea with vomiting.  Mild hematuria.  No dysuria.  No fever or chills.  No chest pain.  No obvious aggravating or alleviating factors.        Review of Systems   Gastrointestinal: Positive for abdominal pain.   All other systems reviewed and are negative.      Past Medical History:   Diagnosis Date   • A-fib    • Abnormal ECG    • Anemia    • Anxiety    • Asthma    • Cancer     Ovarian   • Depression    • Diabetes mellitus    • DVT (deep venous thrombosis)    • Factor 5 Leiden mutation, heterozygous    • Fibroid    • GERD (gastroesophageal reflux disease)    • Gout    • H/O abdominal abscess    • History of sepsis    • History of transfusion    • Hyperlipidemia    • Hypothyroid    • Kidney stone    • Migraines    • Neuropathy    • Ovarian cancer 2021   • Ovarian cyst    • PE (pulmonary embolism)    • Polycystic ovary syndrome    • Preeclampsia    • Rh incompatibility    • Stroke    • TIA (transient ischemic attack)    • Urinary tract infection    • Varicella        Allergies   Allergen Reactions   • Toradol [Ketorolac Tromethamine] Anaphylaxis and Hives   • Haldol [Haloperidol] Hives and Mental Status Change   • Tramadol Hives and Swelling   • Amoxicillin Hives and Rash   • Penicillins Hives and Rash       Past Surgical History:   Procedure Laterality Date   • ABDOMINAL SURGERY     • CARDIAC CATHETERIZATION     •  SECTION     • CHOLECYSTECTOMY     • COLONOSCOPY     • ENDOSCOPY     • EXTRACORPOREAL SHOCK WAVE LITHOTRIPSY (ESWL) Left 10/22/2021     "Procedure: EXTRACORPOREAL SHOCKWAVE LITHOTRIPSY;  Surgeon: Milan Motley MD;  Location: [facility] OR;  Service: Urology;  Laterality: Left;   • LITHOTRIPSY     • RIGHT OOPHORECTOMY     • URETEROSCOPY LASER LITHOTRIPSY WITH STENT INSERTION Left 10/01/2021    Procedure: URETEROSCOPY WITH STENT PLACEMENT;  Surgeon: Milan Motley MD;  Location: [facility] OR;  Service: Urology;  Laterality: Left;   • URETEROSCOPY LASER LITHOTRIPSY WITH STENT INSERTION Left 4/27/2022    Procedure: URETEROSCOPY STENT REMOVAL;  Surgeon: Milan Motley MD;  Location: [facility] OR;  Service: Urology;  Laterality: Left;   • URETEROSCOPY LASER LITHOTRIPSY WITH STENT INSERTION Left 6/29/2022    Procedure: CYSTOSCOPY RETROGRADE LEFT WITH STENT PLACEMENT;  Surgeon: Milan Motley MD;  Location: [facility] OR;  Service: Urology;  Laterality: Left;       Family History   Problem Relation Age of Onset   • Hypertension Mother    • Diabetes Father    • Hypertension Father    • Heart attack Father    • No Known Problems Sister    • No Known Problems Brother    • No Known Problems Son    • No Known Problems Daughter    • No Known Problems Maternal Grandmother    • No Known Problems Maternal Grandfather    • No Known Problems Paternal Grandmother    • No Known Problems Paternal Grandfather    • No Known Problems Cousin    • Rheum arthritis Neg Hx    • Osteoarthritis Neg Hx    • Asthma Neg Hx    • Heart failure Neg Hx    • Hyperlipidemia Neg Hx    • Migraines Neg Hx    • Rashes / Skin problems Neg Hx    • Seizures Neg Hx    • Stroke Neg Hx    • Thyroid disease Neg Hx        Social History     Socioeconomic History   • Marital status:    Tobacco Use   • Smoking status: Never     Passive exposure: Never   • Smokeless tobacco: Never   Vaping Use   • Vaping Use: Never used   Substance and Sexual Activity   • Alcohol use: No   • Drug use: Never     Comment: Pt has track marks on right arm. Pt states \"It's from my INR being " "drawn.\"    • Sexual activity: Not Currently     Partners: Male     Birth control/protection: None           Objective   Physical Exam  Constitutional:       General: She is not in acute distress.     Appearance: Normal appearance. She is not ill-appearing.   HENT:      Head: Normocephalic and atraumatic.      Right Ear: External ear normal.      Left Ear: External ear normal.      Nose: Nose normal.      Mouth/Throat:      Mouth: Mucous membranes are moist.   Eyes:      Extraocular Movements: Extraocular movements intact.      Pupils: Pupils are equal, round, and reactive to light.   Cardiovascular:      Rate and Rhythm: Normal rate and regular rhythm.      Heart sounds: No murmur heard.  Pulmonary:      Effort: Pulmonary effort is normal. No respiratory distress.      Breath sounds: Normal breath sounds. No wheezing.   Abdominal:      General: Bowel sounds are normal.      Palpations: Abdomen is soft.      Tenderness: There is abdominal tenderness in the right lower quadrant.   Musculoskeletal:         General: No deformity or signs of injury. Normal range of motion.      Cervical back: Normal range of motion and neck supple.   Skin:     General: Skin is warm and dry.      Findings: No erythema.   Neurological:      General: No focal deficit present.      Mental Status: She is alert and oriented to person, place, and time. Mental status is at baseline.      Cranial Nerves: No cranial nerve deficit.   Psychiatric:         Mood and Affect: Mood normal.         Behavior: Behavior normal.         Thought Content: Thought content normal.         Procedures           ED Course      CT Abdomen Pelvis With Contrast    Result Date: 5/17/2023  1.  Hepatosplenomegaly with severe fatty infiltration of liver again noted. No change in the degree of severe hepatomegaly. 2.  Prior left nephrectomy. 3.  Stable nonobstructing right kidney stone. No hydronephrosis. 4.  Small fluid along the left iliac fossa and left posterior " pararenal space which is of uncertain etiology but may reflect ascites in the setting of portal hypertension or may represent resolving postsurgical changes given interval improvement from prior exams. 5.  Otherwise stable exam with other nonacute findings as above.  This report was finalized on 5/17/2023 4:55 PM by Dr. Spencer Hightower MD.        Results for orders placed or performed during the hospital encounter of 05/17/23   Comprehensive Metabolic Panel    Specimen: Hand, Left; Blood   Result Value Ref Range    Glucose 250 (H) 65 - 99 mg/dL    BUN 15 6 - 20 mg/dL    Creatinine 0.69 0.57 - 1.00 mg/dL    Sodium 136 136 - 145 mmol/L    Potassium 4.1 3.5 - 5.2 mmol/L    Chloride 100 98 - 107 mmol/L    CO2 18.8 (L) 22.0 - 29.0 mmol/L    Calcium 9.7 8.6 - 10.5 mg/dL    Total Protein 7.8 6.0 - 8.5 g/dL    Albumin 4.2 3.5 - 5.2 g/dL    ALT (SGPT) 32 1 - 33 U/L    AST (SGOT) 36 (H) 1 - 32 U/L    Alkaline Phosphatase 108 39 - 117 U/L    Total Bilirubin 0.3 0.0 - 1.2 mg/dL    Globulin 3.6 gm/dL    A/G Ratio 1.2 g/dL    BUN/Creatinine Ratio 21.7 7.0 - 25.0    Anion Gap 17.2 (H) 5.0 - 15.0 mmol/L    eGFR 114.8 >60.0 mL/min/1.73   Lipase    Specimen: Hand, Left; Blood   Result Value Ref Range    Lipase 29 13 - 60 U/L   Lactic Acid, Plasma    Specimen: Hand, Left; Blood   Result Value Ref Range    Lactate 4.7 (C) 0.5 - 2.0 mmol/L   CBC Auto Differential    Specimen: Hand, Left; Blood   Result Value Ref Range    WBC 5.34 3.40 - 10.80 10*3/mm3    RBC 3.91 3.77 - 5.28 10*6/mm3    Hemoglobin 12.5 12.0 - 15.9 g/dL    Hematocrit 36.4 34.0 - 46.6 %    MCV 93.1 79.0 - 97.0 fL    MCH 32.0 26.6 - 33.0 pg    MCHC 34.3 31.5 - 35.7 g/dL    RDW 14.0 12.3 - 15.4 %    RDW-SD 46.9 37.0 - 54.0 fl    MPV 8.7 6.0 - 12.0 fL    Platelets 234 140 - 450 10*3/mm3    Neutrophil % 61.1 42.7 - 76.0 %    Lymphocyte % 27.3 19.6 - 45.3 %    Monocyte % 8.4 5.0 - 12.0 %    Eosinophil % 2.2 0.3 - 6.2 %    Basophil % 0.6 0.0 - 1.5 %    Immature Grans % 0.4 0.0 -  0.5 %    Neutrophils, Absolute 3.26 1.70 - 7.00 10*3/mm3    Lymphocytes, Absolute 1.46 0.70 - 3.10 10*3/mm3    Monocytes, Absolute 0.45 0.10 - 0.90 10*3/mm3    Eosinophils, Absolute 0.12 0.00 - 0.40 10*3/mm3    Basophils, Absolute 0.03 0.00 - 0.20 10*3/mm3    Immature Grans, Absolute 0.02 0.00 - 0.05 10*3/mm3    nRBC 0.0 0.0 - 0.2 /100 WBC   STAT Lactic Acid, Reflex    Specimen: Arm, Right; Blood   Result Value Ref Range    Lactate 3.8 (C) 0.5 - 2.0 mmol/L   Green Top (Gel)   Result Value Ref Range    Extra Tube Hold for add-ons.    Lavender Top   Result Value Ref Range    Extra Tube hold for add-on    Gold Top - SST   Result Value Ref Range    Extra Tube Hold for add-ons.    Light Blue Top   Result Value Ref Range    Extra Tube Hold for add-ons.                                           Medical Decision Making  Patient requested pain medicine.  Patient's work-up had not been initiated.  Patient was given Tylenol and Decadron.  Patient became disgruntled.  Patient informed the staff that they wished to leave against medical advice.    Patient then expressed that she would stay for full work-up.  CBC and CMP unremarkable.  CT of the abdomen pelvis noted no acute abnormality.  Elevated lactic acid noted.  IV fluids given.  Improved lactic acid.  Patient's lactic acid is chronically elevated for the past 9 months.  Chronic abdominal pain cannot be ruled out.    Work up and results were discussed throughly with the patient.  The patient will be discharged for further monitoring and management with their PCP.  Red flags, warning signs, worsening symptoms, and when to return to the ER discussed with and understood by the patient.  Patient will follow up with their PCP in a timely manner.  Vitals stable at discharge.    Generalized abdominal pain: acute illness or injury  Amount and/or Complexity of Data Reviewed  Labs: ordered. Decision-making details documented in ED Course.  Radiology: ordered. Decision-making details  documented in ED Course.      Risk  OTC drugs.  Prescription drug management.          Final diagnoses:   Generalized abdominal pain       ED Disposition  ED Disposition     None          SalomonEddie S, APRN  [address] [id number]  [phone]    In 1 week      [facility] Emergency Department  [address] [place] [id number] [phone]    If symptoms worsen         Medication List      No changes were made to your prescriptions during this visit.          David Mccoy,   05/17/23 1508       David Mccoy,   05/17/23 [id number]

## 2023-05-17 NOTE — ED NOTES
"Pt states to staff at this time, \"Since he isn't going to do anything for me, I am just going to leave\".  Pt states she wishes to sign out AMA.  Provider made aware.  "

## 2023-05-19 ENCOUNTER — TRANSCRIBE ORDERS (OUTPATIENT)
Dept: ADMINISTRATIVE | Facility: HOSPITAL | Age: 38
End: 2023-05-19
Payer: COMMERCIAL

## 2023-05-19 DIAGNOSIS — Z45.2 ENCOUNTER FOR REMOVAL OF PERIPHERALLY INSERTED CENTRAL CATHETER: Primary | ICD-10-CM

## 2023-05-21 NOTE — ED PROVIDER NOTES
Subjective   History of Present Illness  Patient is a 38-year-old female with significant past medical history positive for A-fib, anemia, asthma, type 2 diabetes, GERD, ovarian cyst, kidney stones presenting to the ER complaints of right flank pain.  Patient reports a 2-day history of hematuria and right flank pain.  Patient denies fever, dysuria, cough, nausea, vomiting, shortness of air or any additional symptoms today.  Patient denies any alleviating or worsening factors.    History provided by:  Patient   used: No        Review of Systems   Constitutional: Negative.  Negative for fever.   HENT: Negative.    Respiratory: Negative.    Cardiovascular: Negative.  Negative for chest pain.   Gastrointestinal: Negative.  Negative for abdominal pain.   Endocrine: Negative.    Genitourinary: Positive for flank pain and hematuria. Negative for dysuria.   Skin: Negative.    Neurological: Negative.    Psychiatric/Behavioral: Negative.    All other systems reviewed and are negative.      Past Medical History:   Diagnosis Date   • A-fib    • Abnormal ECG    • Anemia    • Anxiety    • Asthma    • Cancer     Ovarian   • Depression    • Diabetes mellitus    • DVT (deep venous thrombosis)    • Factor 5 Leiden mutation, heterozygous    • Fibroid    • GERD (gastroesophageal reflux disease)    • Gout    • H/O abdominal abscess    • History of sepsis    • History of transfusion    • Hyperlipidemia    • Hypothyroid    • Kidney stone    • Migraines    • Neuropathy    • Ovarian cancer 02/2021   • Ovarian cyst    • PE (pulmonary embolism)    • Polycystic ovary syndrome    • Preeclampsia    • Rh incompatibility    • Stroke    • TIA (transient ischemic attack)    • Urinary tract infection    • Varicella        Allergies   Allergen Reactions   • Toradol [Ketorolac Tromethamine] Anaphylaxis and Hives   • Haldol [Haloperidol] Hives and Mental Status Change   • Tramadol Hives and Swelling   • Amoxicillin Hives and Rash   •  Penicillins Hives and Rash       Past Surgical History:   Procedure Laterality Date   • ABDOMINAL SURGERY     • CARDIAC CATHETERIZATION     •  SECTION     • CHOLECYSTECTOMY     • COLONOSCOPY     • ENDOSCOPY     • EXTRACORPOREAL SHOCK WAVE LITHOTRIPSY (ESWL) Left 10/22/2021    Procedure: EXTRACORPOREAL SHOCKWAVE LITHOTRIPSY;  Surgeon: Milan Motley MD;  Location: [facility];  Service: Urology;  Laterality: Left;   • LITHOTRIPSY     • RIGHT OOPHORECTOMY     • URETEROSCOPY LASER LITHOTRIPSY WITH STENT INSERTION Left 10/01/2021    Procedure: URETEROSCOPY WITH STENT PLACEMENT;  Surgeon: Milan Motley MD;  Location: [facility];  Service: Urology;  Laterality: Left;   • URETEROSCOPY LASER LITHOTRIPSY WITH STENT INSERTION Left 2022    Procedure: URETEROSCOPY STENT REMOVAL;  Surgeon: Milan Motley MD;  Location: [facility];  Service: Urology;  Laterality: Left;   • URETEROSCOPY LASER LITHOTRIPSY WITH STENT INSERTION Left 2022    Procedure: CYSTOSCOPY RETROGRADE LEFT WITH STENT PLACEMENT;  Surgeon: Milan Motley MD;  Location: [facility];  Service: Urology;  Laterality: Left;       Family History   Problem Relation Age of Onset   • Hypertension Mother    • Diabetes Father    • Hypertension Father    • Heart attack Father    • No Known Problems Sister    • No Known Problems Brother    • No Known Problems Son    • No Known Problems Daughter    • No Known Problems Maternal Grandmother    • No Known Problems Maternal Grandfather    • No Known Problems Paternal Grandmother    • No Known Problems Paternal Grandfather    • No Known Problems Cousin    • Rheum arthritis Neg Hx    • Osteoarthritis Neg Hx    • Asthma Neg Hx    • Heart failure Neg Hx    • Hyperlipidemia Neg Hx    • Migraines Neg Hx    • Rashes / Skin problems Neg Hx    • Seizures Neg Hx    • Stroke Neg Hx    • Thyroid disease Neg Hx        Social History     Socioeconomic History   • Marital status:    Tobacco  "Use   • Smoking status: Never     Passive exposure: Never   • Smokeless tobacco: Never   Vaping Use   • Vaping Use: Never used   Substance and Sexual Activity   • Alcohol use: No   • Drug use: Never     Comment: Pt has track marks on right arm. Pt states \"It's from my INR being drawn.\"    • Sexual activity: Not Currently     Partners: Male     Birth control/protection: None           Objective   Physical Exam  Vitals and nursing note reviewed.   Constitutional:       General: She is not in acute distress.     Appearance: She is well-developed. She is obese. She is not diaphoretic.   HENT:      Head: Normocephalic and atraumatic.      Right Ear: External ear normal.      Left Ear: External ear normal.      Nose: Nose normal.   Eyes:      Conjunctiva/sclera: Conjunctivae normal.      Pupils: Pupils are equal, round, and reactive to light.   Neck:      Vascular: No JVD.      Trachea: No tracheal deviation.   Cardiovascular:      Rate and Rhythm: Normal rate and regular rhythm.      Heart sounds: Normal heart sounds. No murmur heard.  Pulmonary:      Effort: Pulmonary effort is normal. No respiratory distress.      Breath sounds: Normal breath sounds. No wheezing.   Abdominal:      General: Bowel sounds are normal.      Palpations: Abdomen is soft.      Tenderness: There is no abdominal tenderness. There is no right CVA tenderness or left CVA tenderness.   Musculoskeletal:         General: No deformity. Normal range of motion.      Cervical back: Normal range of motion and neck supple.   Skin:     General: Skin is warm and dry.      Capillary Refill: Capillary refill takes 2 to 3 seconds.      Coloration: Skin is not pale.      Findings: No erythema or rash.   Neurological:      Mental Status: She is alert and oriented to person, place, and time.      Cranial Nerves: No cranial nerve deficit.   Psychiatric:         Behavior: Behavior normal.         Thought Content: Thought content normal.         Procedures     "   Results for orders placed or performed during the hospital encounter of 05/05/23   Blood Culture - Blood, Arm, Right    Specimen: Arm, Right; Blood   Result Value Ref Range    Blood Culture No growth at 5 days    Blood Culture - Blood, Arm, Left    Specimen: Arm, Left; Blood   Result Value Ref Range    Blood Culture No growth at 5 days    Comprehensive Metabolic Panel    Specimen: Hand, Right; Blood   Result Value Ref Range    Glucose 258 (H) 65 - 99 mg/dL    BUN 14 6 - 20 mg/dL    Creatinine 0.73 0.57 - 1.00 mg/dL    Sodium 137 136 - 145 mmol/L    Potassium 4.1 3.5 - 5.2 mmol/L    Chloride 100 98 - 107 mmol/L    CO2 21.5 (L) 22.0 - 29.0 mmol/L    Calcium 9.9 8.6 - 10.5 mg/dL    Total Protein 8.2 6.0 - 8.5 g/dL    Albumin 4.2 3.5 - 5.2 g/dL    ALT (SGPT) 26 1 - 33 U/L    AST (SGOT) 24 1 - 32 U/L    Alkaline Phosphatase 108 39 - 117 U/L    Total Bilirubin 0.3 0.0 - 1.2 mg/dL    Globulin 4.0 gm/dL    A/G Ratio 1.1 g/dL    BUN/Creatinine Ratio 19.2 7.0 - 25.0    Anion Gap 15.5 (H) 5.0 - 15.0 mmol/L    eGFR 108.8 >60.0 mL/min/1.73   Lipase    Specimen: Hand, Right; Blood   Result Value Ref Range    Lipase 42 13 - 60 U/L   Lactic Acid, Plasma    Specimen: Hand, Right; Blood   Result Value Ref Range    Lactate 5.0 (C) 0.5 - 2.0 mmol/L   Sedimentation Rate    Specimen: Hand, Right; Blood   Result Value Ref Range    Sed Rate 37 (H) 0 - 20 mm/hr   C-reactive Protein    Specimen: Hand, Right; Blood   Result Value Ref Range    C-Reactive Protein 1.45 (H) 0.00 - 0.50 mg/dL   CBC Auto Differential    Specimen: Hand, Right; Blood   Result Value Ref Range    WBC 5.36 3.40 - 10.80 10*3/mm3    RBC 3.93 3.77 - 5.28 10*6/mm3    Hemoglobin 12.3 12.0 - 15.9 g/dL    Hematocrit 36.7 34.0 - 46.6 %    MCV 93.4 79.0 - 97.0 fL    MCH 31.3 26.6 - 33.0 pg    MCHC 33.5 31.5 - 35.7 g/dL    RDW 14.6 12.3 - 15.4 %    RDW-SD 49.3 37.0 - 54.0 fl    MPV 8.5 6.0 - 12.0 fL    Platelets 240 140 - 450 10*3/mm3    Neutrophil % 51.0 42.7 - 76.0 %     Lymphocyte % 35.4 19.6 - 45.3 %    Monocyte % 9.3 5.0 - 12.0 %    Eosinophil % 3.4 0.3 - 6.2 %    Basophil % 0.7 0.0 - 1.5 %    Immature Grans % 0.2 0.0 - 0.5 %    Neutrophils, Absolute 2.73 1.70 - 7.00 10*3/mm3    Lymphocytes, Absolute 1.90 0.70 - 3.10 10*3/mm3    Monocytes, Absolute 0.50 0.10 - 0.90 10*3/mm3    Eosinophils, Absolute 0.18 0.00 - 0.40 10*3/mm3    Basophils, Absolute 0.04 0.00 - 0.20 10*3/mm3    Immature Grans, Absolute 0.01 0.00 - 0.05 10*3/mm3    nRBC 0.0 0.0 - 0.2 /100 WBC   BNP    Specimen: Hand, Right; Blood   Result Value Ref Range    proBNP <36.0 0.0 - 450.0 pg/mL   Green Top (Gel)   Result Value Ref Range    Extra Tube Hold for add-ons.    Lavender Top   Result Value Ref Range    Extra Tube hold for add-on    Gold Top - SST   Result Value Ref Range    Extra Tube Hold for add-ons.    Light Blue Top   Result Value Ref Range    Extra Tube Hold for add-ons.      CT Abdomen Pelvis With Contrast   Final Result   1. No acute inflammatory process.   2. Prior left nephrectomy and cholecystectomy.   3. Fatty infiltration of the liver.   4. No acute appendicitis, colitis or diverticulitis.       This report was finalized on 5/5/2023 7:10 AM by Kailash eRd MD.          XR Abdomen KUB   Final Result   1. No bowel obstruction.       This report was finalized on 5/5/2023 6:01 AM by Kailash Red MD.              ED Course  ED Course as of 05/21/23 0256   Fri May 05, 2023   0557 Lactate(!!): 5.0 [SM]   0609 XR Abdomen KUB [SM]   0640 C-Reactive Protein(!): 1.45 [SM]   0640 Sed Rate(!): 37 [SM]   0720 CT Abdomen Pelvis With Contrast [SM]   0726 Patient doesn't want to continue antibiotics, fluids, or repeat lactic acid.   [SM]      ED Course User Index  [] Rajani Zepeda, APRN                                           Medical Decision Making  Patient is a 38-year-old female with significant past medical history positive for A-fib, anemia, asthma, type 2 diabetes, GERD, ovarian cyst, kidney stones  presenting to the ER complaints of right flank pain.  Patient reports a 2-day history of hematuria and right flank pain.  Patient denies fever, dysuria, cough, nausea, vomiting, shortness of air or any additional symptoms today.  Patient denies any alleviating or worsening factors.    Advised patient to follow-up with PCP.  Advised patient to return to the ER with new or worsening symptoms.  Vital signs are stable at discharge.  Patient is in no acute distress.    Abdominal pain, unspecified abdominal location: complicated acute illness or injury  Amount and/or Complexity of Data Reviewed  Labs: ordered. Decision-making details documented in ED Course.  Radiology: ordered. Decision-making details documented in ED Course.      Risk  Prescription drug management.          Final diagnoses:   Abdominal pain, unspecified abdominal location       ED Disposition  ED Disposition     ED Disposition   AMA    Condition   --    Comment   --             Eddie Latif, APRN  [address] [facility] [address]  [phone]    Schedule an appointment as soon as possible for a visit today           Medication List      ASK your doctor about these medications    cefdinir 300 MG capsule  Commonly known as: OMNICEF  Take 1 capsule by mouth 2 (Two) Times a Day for 10 days.  Ask about: Should I take this medication?             Rajani Zepeda, APRN  05/21/23 0257

## 2023-05-21 NOTE — ED PROVIDER NOTES
Subjective   History of Present Illness  Patient is a 38 year old female with past medical history significant for atrial fib, anxiety, depression, diabetes, GERD, hypertension, migraines and chronic pain disorder. She presents with lower abdominal pain and nausea. Denies fever. Denies any other significant complaints.        Review of Systems   Constitutional: Negative.  Negative for fever.   HENT: Negative.    Eyes: Negative.    Respiratory: Negative.    Cardiovascular: Negative.  Negative for chest pain.   Gastrointestinal: Positive for abdominal pain and nausea.   Endocrine: Negative.    Genitourinary: Negative.  Negative for dysuria.   Musculoskeletal: Negative.    Skin: Negative.    Allergic/Immunologic: Negative.    Neurological: Negative.    Hematological: Negative.    Psychiatric/Behavioral: Negative.    All other systems reviewed and are negative.      Past Medical History:   Diagnosis Date   • A-fib    • Abnormal ECG    • Anemia    • Anxiety    • Asthma    • Cancer     Ovarian   • Depression    • Diabetes mellitus    • DVT (deep venous thrombosis)    • Factor 5 Leiden mutation, heterozygous    • Fibroid    • GERD (gastroesophageal reflux disease)    • Gout    • H/O abdominal abscess    • History of sepsis    • History of transfusion    • Hyperlipidemia    • Hypothyroid    • Kidney stone    • Migraines    • Neuropathy    • Ovarian cancer 02/2021   • Ovarian cyst    • PE (pulmonary embolism)    • Polycystic ovary syndrome    • Preeclampsia    • Rh incompatibility    • Stroke    • TIA (transient ischemic attack)    • Urinary tract infection    • Varicella        Allergies   Allergen Reactions   • Toradol [Ketorolac Tromethamine] Anaphylaxis and Hives   • Haldol [Haloperidol] Hives and Mental Status Change   • Tramadol Hives and Swelling   • Amoxicillin Hives and Rash   • Penicillins Hives and Rash       Past Surgical History:   Procedure Laterality Date   • ABDOMINAL SURGERY     • CARDIAC CATHETERIZATION      •  SECTION     • CHOLECYSTECTOMY     • COLONOSCOPY     • ENDOSCOPY     • EXTRACORPOREAL SHOCK WAVE LITHOTRIPSY (ESWL) Left 10/22/2021    Procedure: EXTRACORPOREAL SHOCKWAVE LITHOTRIPSY;  Surgeon: Milan Motley MD;  Location: [facility];  Service: Urology;  Laterality: Left;   • LITHOTRIPSY     • RIGHT OOPHORECTOMY     • URETEROSCOPY LASER LITHOTRIPSY WITH STENT INSERTION Left 10/01/2021    Procedure: URETEROSCOPY WITH STENT PLACEMENT;  Surgeon: Milan Motley MD;  Location: [facility];  Service: Urology;  Laterality: Left;   • URETEROSCOPY LASER LITHOTRIPSY WITH STENT INSERTION Left 2022    Procedure: URETEROSCOPY STENT REMOVAL;  Surgeon: Milan Motley MD;  Location: [facility];  Service: Urology;  Laterality: Left;   • URETEROSCOPY LASER LITHOTRIPSY WITH STENT INSERTION Left 2022    Procedure: CYSTOSCOPY RETROGRADE LEFT WITH STENT PLACEMENT;  Surgeon: Milan Motley MD;  Location: [facility];  Service: Urology;  Laterality: Left;       Family History   Problem Relation Age of Onset   • Hypertension Mother    • Diabetes Father    • Hypertension Father    • Heart attack Father    • No Known Problems Sister    • No Known Problems Brother    • No Known Problems Son    • No Known Problems Daughter    • No Known Problems Maternal Grandmother    • No Known Problems Maternal Grandfather    • No Known Problems Paternal Grandmother    • No Known Problems Paternal Grandfather    • No Known Problems Cousin    • Rheum arthritis Neg Hx    • Osteoarthritis Neg Hx    • Asthma Neg Hx    • Heart failure Neg Hx    • Hyperlipidemia Neg Hx    • Migraines Neg Hx    • Rashes / Skin problems Neg Hx    • Seizures Neg Hx    • Stroke Neg Hx    • Thyroid disease Neg Hx        Social History     Socioeconomic History   • Marital status:    Tobacco Use   • Smoking status: Never     Passive exposure: Never   • Smokeless tobacco: Never   Vaping Use   • Vaping Use: Never used  "  Substance and Sexual Activity   • Alcohol use: No   • Drug use: Never     Comment: Pt has track marks on right arm. Pt states \"It's from my INR being drawn.\"    • Sexual activity: Not Currently     Partners: Male     Birth control/protection: None           Objective   Physical Exam  Vitals and nursing note reviewed.   Constitutional:       General: She is not in acute distress.     Appearance: She is well-developed. She is not diaphoretic.   HENT:      Head: Normocephalic and atraumatic.      Right Ear: External ear normal.      Left Ear: External ear normal.      Nose: Nose normal.   Eyes:      Conjunctiva/sclera: Conjunctivae normal.      Pupils: Pupils are equal, round, and reactive to light.   Neck:      Vascular: No JVD.      Trachea: No tracheal deviation.   Cardiovascular:      Rate and Rhythm: Normal rate and regular rhythm.      Heart sounds: Normal heart sounds. No murmur heard.  Pulmonary:      Effort: Pulmonary effort is normal. No respiratory distress.      Breath sounds: Normal breath sounds. No wheezing.   Abdominal:      General: Bowel sounds are normal.      Palpations: Abdomen is soft.      Tenderness: There is no abdominal tenderness.   Musculoskeletal:         General: No deformity. Normal range of motion.      Cervical back: Normal range of motion and neck supple.   Skin:     General: Skin is warm and dry.      Coloration: Skin is not pale.      Findings: No erythema or rash.   Neurological:      Mental Status: She is alert and oriented to person, place, and time.      Cranial Nerves: No cranial nerve deficit.   Psychiatric:         Behavior: Behavior normal.         Thought Content: Thought content normal.         Procedures       Results for orders placed or performed during the hospital encounter of 05/11/23   Urine Culture - Urine, Urine, Clean Catch    Specimen: Urine, Clean Catch   Result Value Ref Range    Urine Culture >100,000 CFU/mL Mixed Margo Isolated    Comprehensive Metabolic " Panel    Specimen: Hand, Left; Blood   Result Value Ref Range    Glucose 218 (H) 65 - 99 mg/dL    BUN 16 6 - 20 mg/dL    Creatinine 0.60 0.57 - 1.00 mg/dL    Sodium 138 136 - 145 mmol/L    Potassium 4.5 3.5 - 5.2 mmol/L    Chloride 101 98 - 107 mmol/L    CO2 19.2 (L) 22.0 - 29.0 mmol/L    Calcium 9.9 8.6 - 10.5 mg/dL    Total Protein 7.9 6.0 - 8.5 g/dL    Albumin 4.1 3.5 - 5.2 g/dL    ALT (SGPT) 39 (H) 1 - 33 U/L    AST (SGOT) 36 (H) 1 - 32 U/L    Alkaline Phosphatase 112 39 - 117 U/L    Total Bilirubin 0.3 0.0 - 1.2 mg/dL    Globulin 3.8 gm/dL    A/G Ratio 1.1 g/dL    BUN/Creatinine Ratio 26.7 (H) 7.0 - 25.0    Anion Gap 17.8 (H) 5.0 - 15.0 mmol/L    eGFR 118.7 >60.0 mL/min/1.73   Lipase    Specimen: Hand, Left; Blood   Result Value Ref Range    Lipase 30 13 - 60 U/L   Urinalysis With Culture If Indicated - Urine, Clean Catch    Specimen: Urine, Clean Catch   Result Value Ref Range    Color, UA Yellow Yellow, Straw    Appearance, UA Clear Clear    pH, UA 5.5 5.0 - 8.0    Specific Gravity, UA 1.018 1.005 - 1.030    Glucose,  mg/dL (Trace) (A) Negative    Ketones, UA Negative Negative    Bilirubin, UA Negative Negative    Blood, UA Negative Negative    Protein, UA 30 mg/dL (1+) (A) Negative    Leuk Esterase, UA Trace (A) Negative    Nitrite, UA Negative Negative    Urobilinogen, UA 0.2 E.U./dL 0.2 - 1.0 E.U./dL   C-reactive Protein    Specimen: Hand, Left; Blood   Result Value Ref Range    C-Reactive Protein 1.38 (H) 0.00 - 0.50 mg/dL   CBC Auto Differential    Specimen: Hand, Left; Blood   Result Value Ref Range    WBC 5.04 3.40 - 10.80 10*3/mm3    RBC 3.84 3.77 - 5.28 10*6/mm3    Hemoglobin 11.9 (L) 12.0 - 15.9 g/dL    Hematocrit 35.3 34.0 - 46.6 %    MCV 91.9 79.0 - 97.0 fL    MCH 31.0 26.6 - 33.0 pg    MCHC 33.7 31.5 - 35.7 g/dL    RDW 14.3 12.3 - 15.4 %    RDW-SD 47.5 37.0 - 54.0 fl    MPV 9.0 6.0 - 12.0 fL    Platelets 217 140 - 450 10*3/mm3    Neutrophil % 61.1 42.7 - 76.0 %    Lymphocyte % 27.8 19.6 -  45.3 %    Monocyte % 7.3 5.0 - 12.0 %    Eosinophil % 3.0 0.3 - 6.2 %    Basophil % 0.6 0.0 - 1.5 %    Immature Grans % 0.2 0.0 - 0.5 %    Neutrophils, Absolute 3.08 1.70 - 7.00 10*3/mm3    Lymphocytes, Absolute 1.40 0.70 - 3.10 10*3/mm3    Monocytes, Absolute 0.37 0.10 - 0.90 10*3/mm3    Eosinophils, Absolute 0.15 0.00 - 0.40 10*3/mm3    Basophils, Absolute 0.03 0.00 - 0.20 10*3/mm3    Immature Grans, Absolute 0.01 0.00 - 0.05 10*3/mm3    nRBC 0.0 0.0 - 0.2 /100 WBC   Urinalysis, Microscopic Only - Urine, Clean Catch    Specimen: Urine, Clean Catch   Result Value Ref Range    RBC, UA 0-2 None Seen, 0-2 /HPF    WBC, UA 13-20 (A) None Seen, 0-2 /HPF    Bacteria, UA None Seen None Seen /HPF    Squamous Epithelial Cells, UA 3-6 (A) None Seen, 0-2 /HPF    Hyaline Casts, UA None Seen None Seen /LPF    Methodology Automated Microscopy    Green Top (Gel)   Result Value Ref Range    Extra Tube Hold for add-ons.    Lavender Top   Result Value Ref Range    Extra Tube hold for add-on    Gold Top - SST   Result Value Ref Range    Extra Tube Hold for add-ons.    Light Blue Top   Result Value Ref Range    Extra Tube Hold for add-ons.        ED Course  ED Course as of 05/20/23 2115   Thu May 11, 2023   0543 CT Abdomen Pelvis Without Contrast  IMPRESSION:     HEPATOSPLENOMEGALY WITH SEVERE ENLARGEMENT OF THE FATTY LIVER.     This report was finalized on 5/11/2023 5:39 AM by Marly Hogan MD. [MB]      ED Course User Index  [MB] Debbi Cunningham APRN                                           [facility]    Final diagnoses:   Lower abdominal pain       ED Disposition  ED Disposition     ED Disposition   Discharge    Condition   Stable    Comment   --             Eddie Latif, APRN  [address] [facility] [address]  [phone]    Call in 2 days           Medication List      ASK your doctor about these medications    cefdinir 300 MG capsule  Commonly known as: OMNICEF  Take 1 capsule by mouth 2 (Two) Times a Day for 10 days.  Ask  about: Should I take this medication?             Debbi Cunnnigham, APRN  05/20/23 7344

## 2023-05-22 ENCOUNTER — TELEPHONE (OUTPATIENT)
Dept: UROLOGY | Facility: CLINIC | Age: 38
End: 2023-05-22

## 2023-05-22 ENCOUNTER — OFFICE VISIT (OUTPATIENT)
Dept: UROLOGY | Facility: CLINIC | Age: 38
End: 2023-05-22
Payer: COMMERCIAL

## 2023-05-22 VITALS
SYSTOLIC BLOOD PRESSURE: 167 MMHG | HEIGHT: 66 IN | WEIGHT: 288.4 LBS | HEART RATE: 117 BPM | DIASTOLIC BLOOD PRESSURE: 110 MMHG | BODY MASS INDEX: 46.35 KG/M2

## 2023-05-22 DIAGNOSIS — N20.0 KIDNEY STONE: ICD-10-CM

## 2023-05-22 DIAGNOSIS — R31.9 URINARY TRACT INFECTION WITH HEMATURIA, SITE UNSPECIFIED: ICD-10-CM

## 2023-05-22 DIAGNOSIS — R10.9 RIGHT FLANK PAIN: Primary | ICD-10-CM

## 2023-05-22 DIAGNOSIS — N39.0 URINARY TRACT INFECTION WITH HEMATURIA, SITE UNSPECIFIED: ICD-10-CM

## 2023-05-22 LAB
BILIRUB BLD-MCNC: NEGATIVE MG/DL
CLARITY, POC: CLEAR
COLOR UR: YELLOW
EXPIRATION DATE: ABNORMAL
GLUCOSE UR STRIP-MCNC: NEGATIVE MG/DL
KETONES UR QL: NEGATIVE
LEUKOCYTE EST, POC: ABNORMAL
Lab: ABNORMAL
NITRITE UR-MCNC: NEGATIVE MG/ML
PH UR: 5 [PH] (ref 5–8)
PROT UR STRIP-MCNC: ABNORMAL MG/DL
RBC # UR STRIP: ABNORMAL /UL
SP GR UR: 1.03 (ref 1–1.03)
UROBILINOGEN UR QL: NORMAL

## 2023-05-22 PROCEDURE — 1159F MED LIST DOCD IN RCRD: CPT

## 2023-05-22 PROCEDURE — 99213 OFFICE O/P EST LOW 20 MIN: CPT

## 2023-05-22 PROCEDURE — 1160F RVW MEDS BY RX/DR IN RCRD: CPT

## 2023-05-22 PROCEDURE — 3080F DIAST BP >= 90 MM HG: CPT

## 2023-05-22 PROCEDURE — 3077F SYST BP >= 140 MM HG: CPT

## 2023-05-22 PROCEDURE — 87086 URINE CULTURE/COLONY COUNT: CPT

## 2023-05-22 RX ORDER — NITROFURANTOIN 25; 75 MG/1; MG/1
100 CAPSULE ORAL 2 TIMES DAILY
Qty: 10 CAPSULE | Refills: 0 | Status: SHIPPED | OUTPATIENT
Start: 2023-05-22

## 2023-05-22 RX ORDER — TAMSULOSIN HYDROCHLORIDE 0.4 MG/1
1 CAPSULE ORAL DAILY
Qty: 30 CAPSULE | Refills: 0 | Status: SHIPPED | OUTPATIENT
Start: 2023-05-22

## 2023-05-22 NOTE — PROGRESS NOTES
"Chief Complaint:    Chief Complaint   Patient presents with   • Flank Pain       Vital Signs:   BP (!) 167/110 (BP Location: Left arm, Patient Position: Sitting)   Pulse 117   Ht 167.6 cm (65.98\")   Wt 131 kg (288 lb 6.4 oz)   BMI 46.57 kg/m²   Body mass index is 46.57 kg/m².      HPI:  Natalia Arzate is a 38 y.o. female who presents today for follow up    History of Present Illness  Ms. Arzate presents to the clinic for follow-up for flank pain.  She has a past medical history significant for left nephrectomy due to a less than 10% functioning kidney.  She was recently seen in the ER on 5/11/2023 and diagnosed with acute UTI.  Urine culture came back at that time showing mixed hafsa growth only.  She was started on cefdinir twice daily.  She still reports significant right-sided back and flank pain with difficulty urinating, dysuria, nausea, and vomiting.  Patient appears in no acute distress today.  Urine does show leukocytes and 3+ blood.  I will send her urine back off for culture analysis.  She does have a history of a right intrarenal stone that has been present for quite some time now.  The stone does not appear to move.  She is concerned that it is moving now and I did discuss the use of an x-ray in office today.  Patient is desirous for pain meds however is allergic to Toradol.      Past Medical History:  Past Medical History:   Diagnosis Date   • A-fib    • Abnormal ECG    • Anemia    • Anxiety    • Asthma    • Cancer     Ovarian   • Depression    • Diabetes mellitus    • DVT (deep venous thrombosis)    • Factor 5 Leiden mutation, heterozygous    • Fibroid    • GERD (gastroesophageal reflux disease)    • Gout    • H/O abdominal abscess    • History of sepsis    • History of transfusion    • Hyperlipidemia    • Hypothyroid    • Kidney stone    • Migraines    • Neuropathy    • Ovarian cancer 02/2021   • Ovarian cyst    • PE (pulmonary embolism)    • Polycystic ovary syndrome    • Preeclampsia    • Rh " incompatibility    • Stroke    • TIA (transient ischemic attack)    • Urinary tract infection    • Varicella        Current Meds:  Current Outpatient Medications   Medication Sig Dispense Refill   • albuterol (PROVENTIL) (2.5 MG/3ML) 0.083% nebulizer solution Take 2.5 mg by nebulization Every 6 (Six) Hours As Needed for Wheezing or Shortness of Air. 150 mL 3   • albuterol sulfate  (90 Base) MCG/ACT inhaler Inhale 2 puffs Every 4 (Four) Hours As Needed for Wheezing. 18 g 3   • allopurinol (ZYLOPRIM) 100 MG tablet Take 1 tablet by mouth Daily. 30 tablet 3   • amLODIPine (NORVASC) 10 MG tablet Take 1 tablet by mouth Daily. 30 tablet 3   • apixaban (ELIQUIS) 5 MG tablet tablet Take 1 tablet by mouth 2 (Two) Times a Day. 60 tablet 3   • azelastine (ASTELIN) 0.1 % nasal spray 2 sprays both nostrils twice daily as needed 1 each 3   • Continuous Blood Gluc Sensor (Dexcom G6 Sensor) Every 10 (Ten) Days. 9 each 3   • cyanocobalamin ([employer] Vitamin B-12) 2000 MCG tablet Take 1 tablet by mouth Daily. 30 tablet 3   • DULoxetine (CYMBALTA) 30 MG capsule Take 1 capsule by mouth 2 (Two) Times a Day. 60 capsule 0   • glucose blood test strip 1 each by Other route 3 (Three) Times a Day. 100 each 5   • Insulin Glargine (Lantus SoloStar) 100 UNIT/ML injection pen Maximum daily dose of 75 units. 24 mL 3   • Insulin Lispro, 1 Unit Dial, (HUMALOG) 100 UNIT/ML solution pen-injector Per sliding scale 15 mL 3   • Insulin Pen Needle 30G X 8 MM misc 1 Device 4 (Four) Times a Day. 200 each 2   • ipratropium-albuterol (DUO-NEB) 0.5-2.5 mg/3 ml nebulizer Take 3 mL by nebulization Every 4 (Four) Hours As Needed for Shortness of Air. 150 mL 2   • Lancets Micro Thin 33G misc 1 Device 3 (Three) Times a Day. 100 each 3   • metoprolol succinate XL (TOPROL-XL) 50 MG 24 hr tablet Take 1 tablet by mouth Daily. 30 tablet 3   • mometasone-formoterol (DULERA 200) 200-5 MCG/ACT inhaler 2 puffs twice daily 1 g 0   • montelukast (SINGULAIR) 10 MG tablet  Take 1 tablet by mouth Every Night. 30 tablet 3   • nystatin (MYCOSTATIN) 577937 UNIT/GM powder Apply  topically to the appropriate area as directed 3 (Three) Times a Day. 60 g 1   • O2 (OXYGEN) Inhale 2 L/min 1 (One) Time.     • polyethylene glycol (MiraLax) 17 g packet Take 17 g by mouth Daily. 30 each 3   • promethazine-dextromethorphan (PROMETHAZINE-DM) 6.25-15 MG/5ML syrup Take 5 mL by mouth 2 (Two) Times a Day As Needed for Cough. 120 mL 0   • Respiratory Therapy Supplies (Nebulizer/Tubing/Mouthpiece) kit 1 each Take As Directed. 1 each 1   • rosuvastatin (CRESTOR) 20 MG tablet Take 1 tablet by mouth Every Night. 30 tablet 5   • Semaglutide, 2 MG/DOSE, 8 MG/3ML solution pen-injector Inject 2 mg under the skin into the appropriate area as directed 1 (One) Time Per Week. 3 mL 3   • ubrogepant (Ubrelvy) 100 MG tablet Take 1 tablet by mouth 1 (One) Time As Needed (Migraine) for up to 1 dose. 4 tablet 0   • vitamin D (ERGOCALCIFEROL) 1.25 MG (25118 UT) capsule capsule Take 1 capsule by mouth 1 (One) Time Per Week. Prior to [facility] Admission, Patient was on:  takes on saturday 5 capsule 3   • gabapentin (NEURONTIN) 300 MG capsule Take 1 capsule by mouth At Night As Needed (neuropathy) for up to 30 days. 30 capsule 0   • nitrofurantoin, macrocrystal-monohydrate, (Macrobid) 100 MG capsule Take 1 capsule by mouth 2 (Two) Times a Day. 10 capsule 0   • tamsulosin (FLOMAX) 0.4 MG capsule 24 hr capsule Take 1 capsule by mouth Daily. 30 capsule 0     No current facility-administered medications for this visit.        Allergies:   Allergies   Allergen Reactions   • Toradol [Ketorolac Tromethamine] Anaphylaxis and Hives   • Haldol [Haloperidol] Hives and Mental Status Change   • Tramadol Hives and Swelling   • Amoxicillin Hives and Rash   • Penicillins Hives and Rash        Past Surgical History:  Past Surgical History:   Procedure Laterality Date   • ABDOMINAL SURGERY     • CARDIAC CATHETERIZATION     •  SECTION    "  • CHOLECYSTECTOMY     • COLONOSCOPY     • ENDOSCOPY     • EXTRACORPOREAL SHOCK WAVE LITHOTRIPSY (ESWL) Left 10/22/2021    Procedure: EXTRACORPOREAL SHOCKWAVE LITHOTRIPSY;  Surgeon: Milan Motley MD;  Location: [facility];  Service: Urology;  Laterality: Left;   • LITHOTRIPSY     • RIGHT OOPHORECTOMY     • URETEROSCOPY LASER LITHOTRIPSY WITH STENT INSERTION Left 10/01/2021    Procedure: URETEROSCOPY WITH STENT PLACEMENT;  Surgeon: Milan Motley MD;  Location: [facility];  Service: Urology;  Laterality: Left;   • URETEROSCOPY LASER LITHOTRIPSY WITH STENT INSERTION Left 4/27/2022    Procedure: URETEROSCOPY STENT REMOVAL;  Surgeon: Milan Motley MD;  Location: [facility];  Service: Urology;  Laterality: Left;   • URETEROSCOPY LASER LITHOTRIPSY WITH STENT INSERTION Left 6/29/2022    Procedure: CYSTOSCOPY RETROGRADE LEFT WITH STENT PLACEMENT;  Surgeon: Milan Motley MD;  Location: [facility];  Service: Urology;  Laterality: Left;       Social History:  Social History     Socioeconomic History   • Marital status:    Tobacco Use   • Smoking status: Never     Passive exposure: Never   • Smokeless tobacco: Never   Vaping Use   • Vaping Use: Never used   Substance and Sexual Activity   • Alcohol use: No   • Drug use: Never     Comment: Pt has track marks on right arm. Pt states \"It's from my INR being drawn.\"    • Sexual activity: Not Currently     Partners: Male     Birth control/protection: None       Family History:  Family History   Problem Relation Age of Onset   • Diabetes Father    • Hypertension Father    • Heart attack Father    • Hypertension Mother    • No Known Problems Paternal Grandmother    • No Known Problems Paternal Grandfather    • No Known Problems Maternal Grandmother    • No Known Problems Maternal Grandfather    • No Known Problems Sister    • No Known Problems Brother    • No Known Problems Daughter    • No Known Problems Son    • No Known Problems Cousin  "   • Rheum arthritis Neg Hx    • Osteoarthritis Neg Hx    • Asthma Neg Hx    • Heart failure Neg Hx    • Hyperlipidemia Neg Hx    • Migraines Neg Hx    • Rashes / Skin problems Neg Hx    • Seizures Neg Hx    • Stroke Neg Hx    • Thyroid disease Neg Hx        Review of Systems:  Review of Systems   Constitutional: Negative for chills, fatigue and fever.   HENT: Negative.    Eyes: Negative.    Respiratory: Negative for cough, shortness of breath and wheezing.    Cardiovascular: Negative.    Gastrointestinal: Positive for abdominal pain, nausea and vomiting.   Endocrine: Negative.    Genitourinary: Positive for difficulty urinating, dysuria and flank pain. Negative for urgency.   Musculoskeletal: Negative for back pain and joint swelling.   Skin: Negative.    Allergic/Immunologic: Negative.    Neurological: Negative for dizziness and headaches.   Hematological: Negative.    Psychiatric/Behavioral: Negative for confusion.       Physical Exam:  Physical Exam  Constitutional:       General: She is not in acute distress.     Appearance: Normal appearance.   HENT:      Head: Normocephalic and atraumatic.      Nose: Nose normal.      Mouth/Throat:      Mouth: Mucous membranes are moist.   Eyes:      Conjunctiva/sclera: Conjunctivae normal.   Cardiovascular:      Rate and Rhythm: Normal rate and regular rhythm.      Pulses: Normal pulses.      Heart sounds: Normal heart sounds.   Pulmonary:      Effort: Pulmonary effort is normal.      Breath sounds: Normal breath sounds.   Abdominal:      General: Bowel sounds are normal.      Palpations: Abdomen is soft.      Tenderness: There is no right CVA tenderness or left CVA tenderness.   Musculoskeletal:         General: Normal range of motion.      Cervical back: Normal range of motion.   Skin:     General: Skin is warm.   Neurological:      General: No focal deficit present.      Mental Status: She is alert and oriented to person, place, and time.   Psychiatric:         Mood and  Affect: Mood normal.         Behavior: Behavior normal.         Thought Content: Thought content normal.         Judgment: Judgment normal.                             Recent Image (CT and/or KUB):   CT Abdomen and Pelvis: Results for orders placed during the hospital encounter of 08/19/22    CT Abdomen Pelvis With & Without Contrast    Narrative  EXAM:  CT Abdomen and Pelvis Without and With Intravenous Contrast    EXAM DATE:  8/19/2022 11:34 AM    CLINICAL HISTORY:  right flank pain    TECHNIQUE:  Axial computed tomography images of the abdomen and pelvis without and  with intravenous contrast.  Sagittal and coronal reformatted images were  created and reviewed.  This CT exam was performed using one or more of  the following dose reduction techniques:  automated exposure control,  adjustment of the mA and/or kV according to patient size, and/or use of  iterative reconstruction technique.    COMPARISON:  08/01/2022    FINDINGS:  Lung bases:  The lung bases are clear.    ABDOMEN:  Liver:  Marked diffuse fatty infiltration of liver with hepatomegaly.  Gallbladder and bile ducts:  Cholecystectomy clips are present.  No  ductal dilation.  Pancreas:  Unremarkable.  No mass.  No ductal dilation.  Spleen:  Spleen is unenlarged.  Adrenals:  The adrenals are unremarkable.  Kidneys and ureters:  Left ureteral stent is again noted with  decompression of the collecting system. No hydronephrosis.  Small  nonobstructing right kidney stones.  No left-sided kidney stones  identified.  No solid enhancing renal masses.  Stable appearance of left  kidney with area of high density adjacent to the left psoas muscle.  Stomach and bowel:  Unremarkable.  No obstruction.  No mucosal  thickening.    PELVIS:  Appendix:  No findings to suggest acute appendicitis.  Bladder:  Unremarkable.  No mass.  No stones.  Reproductive:  Unremarkable as visualized.    ABDOMEN and PELVIS:  Intraperitoneal space:  No pelvic free fluid.  No free  air.  Bones/joints:  Stable appearance of the bony structures.  No acute  fracture.  No dislocation.  Soft tissues:  Unremarkable.  Vasculature:  Unremarkable.  No abdominal aortic aneurysm.  Lymph nodes:  No iliac or retroperitoneal lymphadenopathy.    Impression  1.  Nonobstructing right kidney stones. No hydronephrosis.  2.  Stable appearance and positioning of left ureteral stent. No  left-sided hydronephrosis.  3.  Stable diffuse fatty infiltration of liver with hepatomegaly.  4.  Other incidental and nonacute findings as above.    This report was finalized on 8/19/2022 12:11 PM by Dr. Spencer Hightower MD.     CT Stone Protocol: Results for orders placed during the hospital encounter of 01/01/23    CT Abdomen Pelvis Stone Protocol    Narrative  CT ABDOMEN AND PELVIS, NONCONTRAST, 1/1/2023    HISTORY:  37-year-old female status post left nephrectomy for chronic nonfunctioning kidney at [facility] on 10/18/2022. Subsequent CT studies here showing left flank fluid collection. She presents to the ED with right flank pain.    TECHNIQUE:  CT imaging of the abdomen and pelvis, noncontrast kidney stone protocol. Radiation dose reduction techniques included automated exposure control. Radiation audit for CT and nuclear cardiology exams here in the last 12 months: 21.    COMPARISON:  *  CT abdomen/pelvis, 12/20/2022 and 12/8/2022.    ABDOMEN FINDINGS:  Or two tiny nonobstructing calculi within a right lower pole renal calyx, unchanged. Right kidney, right ureter and urinary bladder are otherwise unremarkable. There is no evidence of urinary obstruction on the right.    Postop changes left nephrectomy. There is a large thick-walled fluid collection in the left flank behind the pericolic gutter extending from the level of the nephrectomy bed the iliac crest. This collection is unchanged since the most recent prior study  but has enlarged since 12/9/2022 as previously described. Given enlargement, infected fluid  collection is possible, clinical and laboratory correlation is recommended.    Marked hepatomegaly with moderate diffuse hepatic steatosis. Mild splenomegaly. Cholecystectomy. No bile duct dilatation.    Small bowel and colon are normal in caliber and appearance, as imaged. The appendix is not seen. No gastric distention. Normal caliber abdominal aorta.    PELVIS FINDINGS:  Uterus, adnexal regions, nondistended urinary bladder and rectum are unremarkable. No free pelvic fluid.    Lung base images show no active disease.    Impression  1.  No change since 12/28/2022.  2.  Tiny nonobstructing right renal calculi. No evidence of ureteral stone or right urinary obstruction.  3.  Postop left nephrectomy. Thick-walled left flank fluid collection is again noted. Clinical and laboratory correlation is recommended to exclude infected collection.  4.  Hepatomegaly and diffuse hepatic steatosis.    Signer Name: Esteban Banegas MD  Signed: 1/1/2023 7:02 AM  Workstation Name: [id number]  [facility]     KUB: Results for orders placed during the hospital encounter of 05/05/23    XR Abdomen KUB    Narrative  Examination: AP views of the abdomen    CLINICAL HISTORY: Right flank pain    COMPARISON: None    FINDINGS: Cholecystectomy clips are noted. Scattered gas is noted within  loops of bowel. Evaluation is markedly limited due to patient body  habitus. No dilated, air-filled loops of small bowel are identified to  suggest small bowel obstruction. No abnormal masses or calcifications.    Impression  1. No bowel obstruction.    This report was finalized on 5/5/2023 6:01 AM by Kailash Red MD.       Labs:  Brief Urine Lab Results  (Last result in the past 365 days)      Color   Clarity   Blood   Leuk Est   Nitrite   Protein   CREAT   Urine HCG        05/22/23 1007 Yellow   Clear   3+   75 Keith/ul   Negative   3+               Office Visit on 05/22/2023   Component Date Value Ref Range Status   • Color  05/22/2023 Yellow  Yellow, Straw, Dark Yellow, Latisha Final   • Clarity, UA 05/22/2023 Clear  Clear Final   • Specific Gravity  05/22/2023 1.030  1.005 - 1.030 Final   • pH, Urine 05/22/2023 5.0  5.0 - 8.0 Final   • Leukocytes 05/22/2023 75 Keith/ul (A)  Negative Final   • Nitrite, UA 05/22/2023 Negative  Negative Final   • Protein, POC 05/22/2023 3+ (A)  Negative mg/dL Final   • Glucose, UA 05/22/2023 Negative  Negative mg/dL Final   • Ketones, UA 05/22/2023 Negative  Negative Final   • Urobilinogen, UA 05/22/2023 Normal  Normal, 0.2 E.U./dL Final   • Bilirubin 05/22/2023 Negative  Negative Final   • Blood, UA 05/22/2023 3+ (A)  Negative Final   • Lot Number 05/22/2023 [id number]   Final   • Expiration Date 05/22/2023 [id number]   Final   Admission on 05/17/2023, Discharged on 05/17/2023   Component Date Value Ref Range Status   • Glucose 05/17/2023 250 (H)  65 - 99 mg/dL Final   • BUN 05/17/2023 15  6 - 20 mg/dL Final   • Creatinine 05/17/2023 0.69  0.57 - 1.00 mg/dL Final   • Sodium 05/17/2023 136  136 - 145 mmol/L Final   • Potassium 05/17/2023 4.1  3.5 - 5.2 mmol/L Final   • Chloride 05/17/2023 100  98 - 107 mmol/L Final   • CO2 05/17/2023 18.8 (L)  22.0 - 29.0 mmol/L Final   • Calcium 05/17/2023 9.7  8.6 - 10.5 mg/dL Final   • Total Protein 05/17/2023 7.8  6.0 - 8.5 g/dL Final   • Albumin 05/17/2023 4.2  3.5 - 5.2 g/dL Final   • ALT (SGPT) 05/17/2023 32  1 - 33 U/L Final   • AST (SGOT) 05/17/2023 36 (H)  1 - 32 U/L Final   • Alkaline Phosphatase 05/17/2023 108  39 - 117 U/L Final   • Total Bilirubin 05/17/2023 0.3  0.0 - 1.2 mg/dL Final   • Globulin 05/17/2023 3.6  gm/dL Final   • A/G Ratio 05/17/2023 1.2  g/dL Final   • BUN/Creatinine Ratio 05/17/2023 21.7  7.0 - 25.0 Final   • Anion Gap 05/17/2023 17.2 (H)  5.0 - 15.0 mmol/L Final   • eGFR 05/17/2023 114.8  >60.0 mL/min/1.73 Final   • Lipase 05/17/2023 29  13 - 60 U/L Final   • Lactate 05/17/2023 4.7 (C)  0.5 - 2.0 mmol/L Final   • Extra Tube 05/17/2023  Hold for add-ons.   Final    Auto resulted.   • Extra Tube 05/17/2023 hold for add-on   Final    Auto resulted   • Extra Tube 05/17/2023 Hold for add-ons.   Final    Auto resulted.   • Extra Tube 05/17/2023 Hold for add-ons.   Final    Auto resulted   • WBC 05/17/2023 5.34  3.40 - 10.80 10*3/mm3 Final   • RBC 05/17/2023 3.91  3.77 - 5.28 10*6/mm3 Final   • Hemoglobin 05/17/2023 12.5  12.0 - 15.9 g/dL Final   • Hematocrit 05/17/2023 36.4  34.0 - 46.6 % Final   • MCV 05/17/2023 93.1  79.0 - 97.0 fL Final   • MCH 05/17/2023 32.0  26.6 - 33.0 pg Final   • MCHC 05/17/2023 34.3  31.5 - 35.7 g/dL Final   • RDW 05/17/2023 14.0  12.3 - 15.4 % Final   • RDW-SD 05/17/2023 46.9  37.0 - 54.0 fl Final   • MPV 05/17/2023 8.7  6.0 - 12.0 fL Final   • Platelets 05/17/2023 234  140 - 450 10*3/mm3 Final   • Neutrophil % 05/17/2023 61.1  42.7 - 76.0 % Final   • Lymphocyte % 05/17/2023 27.3  19.6 - 45.3 % Final   • Monocyte % 05/17/2023 8.4  5.0 - 12.0 % Final   • Eosinophil % 05/17/2023 2.2  0.3 - 6.2 % Final   • Basophil % 05/17/2023 0.6  0.0 - 1.5 % Final   • Immature Grans % 05/17/2023 0.4  0.0 - 0.5 % Final   • Neutrophils, Absolute 05/17/2023 3.26  1.70 - 7.00 10*3/mm3 Final   • Lymphocytes, Absolute 05/17/2023 1.46  0.70 - 3.10 10*3/mm3 Final   • Monocytes, Absolute 05/17/2023 0.45  0.10 - 0.90 10*3/mm3 Final   • Eosinophils, Absolute 05/17/2023 0.12  0.00 - 0.40 10*3/mm3 Final   • Basophils, Absolute 05/17/2023 0.03  0.00 - 0.20 10*3/mm3 Final   • Immature Grans, Absolute 05/17/2023 0.02  0.00 - 0.05 10*3/mm3 Final   • nRBC 05/17/2023 0.0  0.0 - 0.2 /100 WBC Final   • Lactate 05/17/2023 3.8 (C)  0.5 - 2.0 mmol/L Final   Admission on 05/11/2023, Discharged on 05/11/2023   Component Date Value Ref Range Status   • Glucose 05/11/2023 218 (H)  65 - 99 mg/dL Final   • BUN 05/11/2023 16  6 - 20 mg/dL Final   • Creatinine 05/11/2023 0.60  0.57 - 1.00 mg/dL Final   • Sodium 05/11/2023 138  136 - 145 mmol/L Final   • Potassium 05/11/2023  4.5  3.5 - 5.2 mmol/L Final    Slight hemolysis detected by analyzer. Results may be affected.   • Chloride 05/11/2023 101  98 - 107 mmol/L Final   • CO2 05/11/2023 19.2 (L)  22.0 - 29.0 mmol/L Final   • Calcium 05/11/2023 9.9  8.6 - 10.5 mg/dL Final   • Total Protein 05/11/2023 7.9  6.0 - 8.5 g/dL Final   • Albumin 05/11/2023 4.1  3.5 - 5.2 g/dL Final   • ALT (SGPT) 05/11/2023 39 (H)  1 - 33 U/L Final   • AST (SGOT) 05/11/2023 36 (H)  1 - 32 U/L Final   • Alkaline Phosphatase 05/11/2023 112  39 - 117 U/L Final   • Total Bilirubin 05/11/2023 0.3  0.0 - 1.2 mg/dL Final   • Globulin 05/11/2023 3.8  gm/dL Final   • A/G Ratio 05/11/2023 1.1  g/dL Final   • BUN/Creatinine Ratio 05/11/2023 26.7 (H)  7.0 - 25.0 Final   • Anion Gap 05/11/2023 17.8 (H)  5.0 - 15.0 mmol/L Final   • eGFR 05/11/2023 118.7  >60.0 mL/min/1.73 Final   • Lipase 05/11/2023 30  13 - 60 U/L Final   • Color, UA 05/11/2023 Yellow  Yellow, Straw Final   • Appearance, UA 05/11/2023 Clear  Clear Final   • pH, UA 05/11/2023 5.5  5.0 - 8.0 Final   • Specific Gravity, UA 05/11/2023 1.018  1.005 - 1.030 Final   • Glucose, UA 05/11/2023 100 mg/dL (Trace) (A)  Negative Final   • Ketones, UA 05/11/2023 Negative  Negative Final   • Bilirubin, UA 05/11/2023 Negative  Negative Final   • Blood, UA 05/11/2023 Negative  Negative Final   • Protein, UA 05/11/2023 30 mg/dL (1+) (A)  Negative Final   • Leuk Esterase, UA 05/11/2023 Trace (A)  Negative Final   • Nitrite, UA 05/11/2023 Negative  Negative Final   • Urobilinogen, UA 05/11/2023 0.2 E.U./dL  0.2 - 1.0 E.U./dL Final   • C-Reactive Protein 05/11/2023 1.38 (H)  0.00 - 0.50 mg/dL Final   • WBC 05/11/2023 5.04  3.40 - 10.80 10*3/mm3 Final   • RBC 05/11/2023 3.84  3.77 - 5.28 10*6/mm3 Final   • Hemoglobin 05/11/2023 11.9 (L)  12.0 - 15.9 g/dL Final   • Hematocrit 05/11/2023 35.3  34.0 - 46.6 % Final   • MCV 05/11/2023 91.9  79.0 - 97.0 fL Final   • MCH 05/11/2023 31.0  26.6 - 33.0 pg Final   • MCHC 05/11/2023 33.7   31.5 - 35.7 g/dL Final   • RDW 05/11/2023 14.3  12.3 - 15.4 % Final   • RDW-SD 05/11/2023 47.5  37.0 - 54.0 fl Final   • MPV 05/11/2023 9.0  6.0 - 12.0 fL Final   • Platelets 05/11/2023 217  140 - 450 10*3/mm3 Final   • Neutrophil % 05/11/2023 61.1  42.7 - 76.0 % Final   • Lymphocyte % 05/11/2023 27.8  19.6 - 45.3 % Final   • Monocyte % 05/11/2023 7.3  5.0 - 12.0 % Final   • Eosinophil % 05/11/2023 3.0  0.3 - 6.2 % Final   • Basophil % 05/11/2023 0.6  0.0 - 1.5 % Final   • Immature Grans % 05/11/2023 0.2  0.0 - 0.5 % Final   • Neutrophils, Absolute 05/11/2023 3.08  1.70 - 7.00 10*3/mm3 Final   • Lymphocytes, Absolute 05/11/2023 1.40  0.70 - 3.10 10*3/mm3 Final   • Monocytes, Absolute 05/11/2023 0.37  0.10 - 0.90 10*3/mm3 Final   • Eosinophils, Absolute 05/11/2023 0.15  0.00 - 0.40 10*3/mm3 Final   • Basophils, Absolute 05/11/2023 0.03  0.00 - 0.20 10*3/mm3 Final   • Immature Grans, Absolute 05/11/2023 0.01  0.00 - 0.05 10*3/mm3 Final   • nRBC 05/11/2023 0.0  0.0 - 0.2 /100 WBC Final   • Extra Tube 05/11/2023 Hold for add-ons.   Final    Auto resulted.   • Extra Tube 05/11/2023 hold for add-on   Final    Auto resulted   • Extra Tube 05/11/2023 Hold for add-ons.   Final    Auto resulted.   • Extra Tube 05/11/2023 Hold for add-ons.   Final    Auto resulted   • RBC, UA 05/11/2023 0-2  None Seen, 0-2 /HPF Final   • WBC, UA 05/11/2023 13-20 (A)  None Seen, 0-2 /HPF Final   • Bacteria, UA 05/11/2023 None Seen  None Seen /HPF Final   • Squamous Epithelial Cells, UA 05/11/2023 3-6 (A)  None Seen, 0-2 /HPF Final   • Hyaline Casts, UA 05/11/2023 None Seen  None Seen /LPF Final   • Methodology 05/11/2023 Automated Microscopy   Final   • Urine Culture 05/11/2023 >100,000 CFU/mL Mixed Margo Isolated   Final   Admission on 05/05/2023, Discharged on 05/05/2023   Component Date Value Ref Range Status   • Glucose 05/05/2023 258 (H)  65 - 99 mg/dL Final   • BUN 05/05/2023 14  6 - 20 mg/dL Final   • Creatinine 05/05/2023 0.73  0.57 -  1.00 mg/dL Final   • Sodium 05/05/2023 137  136 - 145 mmol/L Final   • Potassium 05/05/2023 4.1  3.5 - 5.2 mmol/L Final    Slight hemolysis detected by analyzer. Results may be affected.   • Chloride 05/05/2023 100  98 - 107 mmol/L Final   • CO2 05/05/2023 21.5 (L)  22.0 - 29.0 mmol/L Final   • Calcium 05/05/2023 9.9  8.6 - 10.5 mg/dL Final   • Total Protein 05/05/2023 8.2  6.0 - 8.5 g/dL Final   • Albumin 05/05/2023 4.2  3.5 - 5.2 g/dL Final   • ALT (SGPT) 05/05/2023 26  1 - 33 U/L Final   • AST (SGOT) 05/05/2023 24  1 - 32 U/L Final   • Alkaline Phosphatase 05/05/2023 108  39 - 117 U/L Final   • Total Bilirubin 05/05/2023 0.3  0.0 - 1.2 mg/dL Final   • Globulin 05/05/2023 4.0  gm/dL Final   • A/G Ratio 05/05/2023 1.1  g/dL Final   • BUN/Creatinine Ratio 05/05/2023 19.2  7.0 - 25.0 Final   • Anion Gap 05/05/2023 15.5 (H)  5.0 - 15.0 mmol/L Final   • eGFR 05/05/2023 108.8  >60.0 mL/min/1.73 Final   • Lipase 05/05/2023 42  13 - 60 U/L Final   • Lactate 05/05/2023 5.0 (C)  0.5 - 2.0 mmol/L Final   • Sed Rate 05/05/2023 37 (H)  0 - 20 mm/hr Final   • C-Reactive Protein 05/05/2023 1.45 (H)  0.00 - 0.50 mg/dL Final   • WBC 05/05/2023 5.36  3.40 - 10.80 10*3/mm3 Final   • RBC 05/05/2023 3.93  3.77 - 5.28 10*6/mm3 Final   • Hemoglobin 05/05/2023 12.3  12.0 - 15.9 g/dL Final   • Hematocrit 05/05/2023 36.7  34.0 - 46.6 % Final   • MCV 05/05/2023 93.4  79.0 - 97.0 fL Final   • MCH 05/05/2023 31.3  26.6 - 33.0 pg Final   • MCHC 05/05/2023 33.5  31.5 - 35.7 g/dL Final   • RDW 05/05/2023 14.6  12.3 - 15.4 % Final   • RDW-SD 05/05/2023 49.3  37.0 - 54.0 fl Final   • MPV 05/05/2023 8.5  6.0 - 12.0 fL Final   • Platelets 05/05/2023 240  140 - 450 10*3/mm3 Final   • Neutrophil % 05/05/2023 51.0  42.7 - 76.0 % Final   • Lymphocyte % 05/05/2023 35.4  19.6 - 45.3 % Final   • Monocyte % 05/05/2023 9.3  5.0 - 12.0 % Final   • Eosinophil % 05/05/2023 3.4  0.3 - 6.2 % Final   • Basophil % 05/05/2023 0.7  0.0 - 1.5 % Final   • Immature  Grans % 05/05/2023 0.2  0.0 - 0.5 % Final   • Neutrophils, Absolute 05/05/2023 2.73  1.70 - 7.00 10*3/mm3 Final   • Lymphocytes, Absolute 05/05/2023 1.90  0.70 - 3.10 10*3/mm3 Final   • Monocytes, Absolute 05/05/2023 0.50  0.10 - 0.90 10*3/mm3 Final   • Eosinophils, Absolute 05/05/2023 0.18  0.00 - 0.40 10*3/mm3 Final   • Basophils, Absolute 05/05/2023 0.04  0.00 - 0.20 10*3/mm3 Final   • Immature Grans, Absolute 05/05/2023 0.01  0.00 - 0.05 10*3/mm3 Final   • nRBC 05/05/2023 0.0  0.0 - 0.2 /100 WBC Final   • Extra Tube 05/05/2023 Hold for add-ons.   Final    Auto resulted.   • Extra Tube 05/05/2023 hold for add-on   Final    Auto resulted   • Extra Tube 05/05/2023 Hold for add-ons.   Final    Auto resulted.   • Extra Tube 05/05/2023 Hold for add-ons.   Final    Auto resulted   • proBNP 05/05/2023 <36.0  0.0 - 450.0 pg/mL Final   • Blood Culture 05/05/2023 No growth at 5 days   Final   • Blood Culture 05/05/2023 No growth at 5 days   Final   Admission on 05/01/2023, Discharged on 05/01/2023   Component Date Value Ref Range Status   • Glucose 05/01/2023 204 (H)  65 - 99 mg/dL Final   • BUN 05/01/2023 14  6 - 20 mg/dL Final   • Creatinine 05/01/2023 0.71  0.57 - 1.00 mg/dL Final   • Sodium 05/01/2023 130 (L)  136 - 145 mmol/L Final   • Potassium 05/01/2023 4.0  3.5 - 5.2 mmol/L Final    Slight hemolysis detected by analyzer. Results may be affected.   • Chloride 05/01/2023 96 (L)  98 - 107 mmol/L Final   • CO2 05/01/2023 16.5 (L)  22.0 - 29.0 mmol/L Final   • Calcium 05/01/2023 10.2  8.6 - 10.5 mg/dL Final   • Total Protein 05/01/2023 8.6 (H)  6.0 - 8.5 g/dL Final   • Albumin 05/01/2023 4.1  3.5 - 5.2 g/dL Final   • ALT (SGPT) 05/01/2023 26  1 - 33 U/L Final   • AST (SGOT) 05/01/2023 33 (H)  1 - 32 U/L Final   • Alkaline Phosphatase 05/01/2023 113  39 - 117 U/L Final   • Total Bilirubin 05/01/2023 0.4  0.0 - 1.2 mg/dL Final   • Globulin 05/01/2023 4.5  gm/dL Final   • A/G Ratio 05/01/2023 0.9  g/dL Final   •  BUN/Creatinine Ratio 05/01/2023 19.7  7.0 - 25.0 Final   • Anion Gap 05/01/2023 17.5 (H)  5.0 - 15.0 mmol/L Final   • eGFR 05/01/2023 112.5  >60.0 mL/min/1.73 Final   • Color, UA 05/01/2023 Orange (A)  Yellow, Straw Final   • Appearance, UA 05/01/2023 Cloudy (A)  Clear Final   • pH, UA 05/01/2023 <=5.0  5.0 - 8.0 Final   • Specific Gravity, UA 05/01/2023 1.026  1.005 - 1.030 Final   • Glucose, UA 05/01/2023 Negative  Negative Final   • Ketones, UA 05/01/2023 Negative  Negative Final   • Bilirubin, UA 05/01/2023 Negative  Negative Final   • Blood, UA 05/01/2023 Large (3+) (A)  Negative Final   • Protein, UA 05/01/2023 100 mg/dL (2+) (A)  Negative Final   • Leuk Esterase, UA 05/01/2023 Moderate (2+) (A)  Negative Final   • Nitrite, UA 05/01/2023 Negative  Negative Final   • Urobilinogen, UA 05/01/2023 0.2 E.U./dL  0.2 - 1.0 E.U./dL Final   • Lipase 05/01/2023 27  13 - 60 U/L Final   • WBC 05/01/2023 5.88  3.40 - 10.80 10*3/mm3 Final   • RBC 05/01/2023 4.12  3.77 - 5.28 10*6/mm3 Final   • Hemoglobin 05/01/2023 12.6  12.0 - 15.9 g/dL Final   • Hematocrit 05/01/2023 38.5  34.0 - 46.6 % Final   • MCV 05/01/2023 93.4  79.0 - 97.0 fL Final   • MCH 05/01/2023 30.6  26.6 - 33.0 pg Final   • MCHC 05/01/2023 32.7  31.5 - 35.7 g/dL Final   • RDW 05/01/2023 15.2  12.3 - 15.4 % Final   • RDW-SD 05/01/2023 51.9  37.0 - 54.0 fl Final   • MPV 05/01/2023 8.4  6.0 - 12.0 fL Final   • Platelets 05/01/2023 280  140 - 450 10*3/mm3 Final   • Neutrophil % 05/01/2023 57.7  42.7 - 76.0 % Final   • Lymphocyte % 05/01/2023 30.4  19.6 - 45.3 % Final   • Monocyte % 05/01/2023 7.8  5.0 - 12.0 % Final   • Eosinophil % 05/01/2023 3.1  0.3 - 6.2 % Final   • Basophil % 05/01/2023 0.7  0.0 - 1.5 % Final   • Immature Grans % 05/01/2023 0.3  0.0 - 0.5 % Final   • Neutrophils, Absolute 05/01/2023 3.39  1.70 - 7.00 10*3/mm3 Final   • Lymphocytes, Absolute 05/01/2023 1.79  0.70 - 3.10 10*3/mm3 Final   • Monocytes, Absolute 05/01/2023 0.46  0.10 - 0.90  10*3/mm3 Final   • Eosinophils, Absolute 05/01/2023 0.18  0.00 - 0.40 10*3/mm3 Final   • Basophils, Absolute 05/01/2023 0.04  0.00 - 0.20 10*3/mm3 Final   • Immature Grans, Absolute 05/01/2023 0.02  0.00 - 0.05 10*3/mm3 Final   • nRBC 05/01/2023 0.0  0.0 - 0.2 /100 WBC Final   • Extra Tube 05/01/2023 Hold for add-ons.   Final    Auto resulted.   • Extra Tube 05/01/2023 hold for add-on   Final    Auto resulted   • RBC, UA 05/01/2023 Too Numerous to Count (A)  None Seen, 0-2 /HPF Final   • WBC, UA 05/01/2023 Too Numerous to Count (A)  None Seen, 0-2 /HPF Final   • Bacteria, UA 05/01/2023 1+ (A)  None Seen /HPF Final   • Squamous Epithelial Cells, UA 05/01/2023 Unable to determine due to loaded field (A)  None Seen, 0-2 /HPF Final   • Hyaline Casts, UA 05/01/2023 Unable to determine due to loaded field  None Seen /LPF Final   • Methodology 05/01/2023 Manual Light Microscopy   Final   Admission on 04/09/2023, Discharged on 04/09/2023   Component Date Value Ref Range Status   • Color, UA 04/09/2023 Yellow  Yellow, Straw Final   • Appearance, UA 04/09/2023 Clear  Clear Final   • pH, UA 04/09/2023 5.5  5.0 - 8.0 Final   • Specific Gravity, UA 04/09/2023 >1.030 (H)  1.005 - 1.030 Final   • Glucose, UA 04/09/2023 100 mg/dL (Trace) (A)  Negative Final   • Ketones, UA 04/09/2023 Negative  Negative Final   • Bilirubin, UA 04/09/2023 Negative  Negative Final   • Blood, UA 04/09/2023 Negative  Negative Final   • Protein, UA 04/09/2023 Trace (A)  Negative Final   • Leuk Esterase, UA 04/09/2023 Trace (A)  Negative Final   • Nitrite, UA 04/09/2023 Negative  Negative Final   • Urobilinogen, UA 04/09/2023 0.2 E.U./dL  0.2 - 1.0 E.U./dL Final   • Extra Tube 04/09/2023 Hold for add-ons.   Final    Auto resulted.   • Extra Tube 04/09/2023 hold for add-on   Final    Auto resulted   • Extra Tube 04/09/2023 Hold for add-ons.   Final    Auto resulted   • Glucose 04/09/2023 278 (H)  65 - 99 mg/dL Final   • BUN 04/09/2023 16  6 - 20 mg/dL  Final   • Creatinine 04/09/2023 0.71  0.57 - 1.00 mg/dL Final   • Sodium 04/09/2023 136  136 - 145 mmol/L Final   • Potassium 04/09/2023 4.6  3.5 - 5.2 mmol/L Final    Slight hemolysis detected by analyzer. Results may be affected.   • Chloride 04/09/2023 99  98 - 107 mmol/L Final   • CO2 04/09/2023 19.1 (L)  22.0 - 29.0 mmol/L Final   • Calcium 04/09/2023 9.9  8.6 - 10.5 mg/dL Final   • Total Protein 04/09/2023 8.3  6.0 - 8.5 g/dL Final   • Albumin 04/09/2023 4.1  3.5 - 5.2 g/dL Final   • ALT (SGPT) 04/09/2023 27  1 - 33 U/L Final   • AST (SGOT) 04/09/2023 28  1 - 32 U/L Final   • Alkaline Phosphatase 04/09/2023 138 (H)  39 - 117 U/L Final   • Total Bilirubin 04/09/2023 0.4  0.0 - 1.2 mg/dL Final   • Globulin 04/09/2023 4.2  gm/dL Final   • A/G Ratio 04/09/2023 1.0  g/dL Final   • BUN/Creatinine Ratio 04/09/2023 22.5  7.0 - 25.0 Final   • Anion Gap 04/09/2023 17.9 (H)  5.0 - 15.0 mmol/L Final   • eGFR 04/09/2023 112.5  >60.0 mL/min/1.73 Final   • Lipase 04/09/2023 33  13 - 60 U/L Final   • C-Reactive Protein 04/09/2023 1.71 (H)  0.00 - 0.50 mg/dL Final   • HCG Qualitative 04/09/2023 Negative  Negative Final   • WBC 04/09/2023 5.00  3.40 - 10.80 10*3/mm3 Final   • RBC 04/09/2023 3.86  3.77 - 5.28 10*6/mm3 Final   • Hemoglobin 04/09/2023 11.7 (L)  12.0 - 15.9 g/dL Final   • Hematocrit 04/09/2023 34.9  34.0 - 46.6 % Final   • MCV 04/09/2023 90.4  79.0 - 97.0 fL Final   • MCH 04/09/2023 30.3  26.6 - 33.0 pg Final   • MCHC 04/09/2023 33.5  31.5 - 35.7 g/dL Final   • RDW 04/09/2023 18.5 (H)  12.3 - 15.4 % Final   • RDW-SD 04/09/2023 60.7 (H)  37.0 - 54.0 fl Final   • MPV 04/09/2023 8.5  6.0 - 12.0 fL Final   • Platelets 04/09/2023 249  140 - 450 10*3/mm3 Final   • Neutrophil % 04/09/2023 65.8  42.7 - 76.0 % Final   • Lymphocyte % 04/09/2023 25.0  19.6 - 45.3 % Final   • Monocyte % 04/09/2023 6.0  5.0 - 12.0 % Final   • Eosinophil % 04/09/2023 2.2  0.3 - 6.2 % Final   • Basophil % 04/09/2023 0.8  0.0 - 1.5 % Final   •  Immature Grans % 04/09/2023 0.2  0.0 - 0.5 % Final   • Neutrophils, Absolute 04/09/2023 3.29  1.70 - 7.00 10*3/mm3 Final   • Lymphocytes, Absolute 04/09/2023 1.25  0.70 - 3.10 10*3/mm3 Final   • Monocytes, Absolute 04/09/2023 0.30  0.10 - 0.90 10*3/mm3 Final   • Eosinophils, Absolute 04/09/2023 0.11  0.00 - 0.40 10*3/mm3 Final   • Basophils, Absolute 04/09/2023 0.04  0.00 - 0.20 10*3/mm3 Final   • Immature Grans, Absolute 04/09/2023 0.01  0.00 - 0.05 10*3/mm3 Final   • nRBC 04/09/2023 0.0  0.0 - 0.2 /100 WBC Final   • RBC, UA 04/09/2023 0-2  None Seen, 0-2 /HPF Final   • WBC, UA 04/09/2023 13-20 (A)  None Seen, 0-2 /HPF Final   • Bacteria, UA 04/09/2023 None Seen  None Seen /HPF Final   • Squamous Epithelial Cells, UA 04/09/2023 0-2  None Seen, 0-2 /HPF Final   • Yeast, UA 04/09/2023 Small/1+ Budding Yeast  None Seen /HPF Final   • Hyaline Casts, UA 04/09/2023 0-2  None Seen /LPF Final   • Methodology 04/09/2023 Manual Light Microscopy   Final   • Urine Culture 04/09/2023 50,000 CFU/mL Yeast isolated (A)   Final   • Glucose 04/09/2023 184 (H)  70 - 130 mg/dL Final    Meter: [id number] : [id number] [employer]   Admission on 04/07/2023, Discharged on 04/07/2023   Component Date Value Ref Range Status   • Glucose 04/07/2023 278 (H)  65 - 99 mg/dL Final   • BUN 04/07/2023 22 (H)  6 - 20 mg/dL Final   • Creatinine 04/07/2023 0.68  0.57 - 1.00 mg/dL Final   • Sodium 04/07/2023 132 (L)  136 - 145 mmol/L Final   • Potassium 04/07/2023 4.5  3.5 - 5.2 mmol/L Final   • Chloride 04/07/2023 97 (L)  98 - 107 mmol/L Final   • CO2 04/07/2023 20.1 (L)  22.0 - 29.0 mmol/L Final   • Calcium 04/07/2023 9.9  8.6 - 10.5 mg/dL Final   • Total Protein 04/07/2023 8.3  6.0 - 8.5 g/dL Final   • Albumin 04/07/2023 4.0  3.5 - 5.2 g/dL Final   • ALT (SGPT) 04/07/2023 18  1 - 33 U/L Final   • AST (SGOT) 04/07/2023 24  1 - 32 U/L Final   • Alkaline Phosphatase 04/07/2023 129 (H)  39 - 117 U/L Final   • Total Bilirubin 04/07/2023 0.5  0.0 -  1.2 mg/dL Final   • Globulin 04/07/2023 4.3  gm/dL Final   • A/G Ratio 04/07/2023 0.9  g/dL Final   • BUN/Creatinine Ratio 04/07/2023 32.4 (H)  7.0 - 25.0 Final   • Anion Gap 04/07/2023 14.9  5.0 - 15.0 mmol/L Final   • eGFR 04/07/2023 115.2  >60.0 mL/min/1.73 Final   • Color, UA 04/07/2023 Yellow  Yellow, Straw Final   • Appearance, UA 04/07/2023 Clear  Clear Final   • pH, UA 04/07/2023 <=5.0  5.0 - 8.0 Final   • Specific Gravity, UA 04/07/2023 1.020  1.005 - 1.030 Final   • Glucose, UA 04/07/2023 500 mg/dL (2+) (A)  Negative Final   • Ketones, UA 04/07/2023 Negative  Negative Final   • Bilirubin, UA 04/07/2023 Negative  Negative Final   • Blood, UA 04/07/2023 Large (3+) (A)  Negative Final   • Protein, UA 04/07/2023 30 mg/dL (1+) (A)  Negative Final   • Leuk Esterase, UA 04/07/2023 Small (1+) (A)  Negative Final   • Nitrite, UA 04/07/2023 Negative  Negative Final   • Urobilinogen, UA 04/07/2023 0.2 E.U./dL  0.2 - 1.0 E.U./dL Final   • THC, Screen, Urine 04/07/2023 Negative  Negative Final   • Phencyclidine (PCP), Urine 04/07/2023 Negative  Negative Final   • Cocaine Screen, Urine 04/07/2023 Negative  Negative Final   • Methamphetamine, Ur 04/07/2023 Negative  Negative Final   • Opiate Screen 04/07/2023 Positive (A)  Negative Final   • Amphetamine Screen, Urine 04/07/2023 Negative  Negative Final   • Benzodiazepine Screen, Urine 04/07/2023 Negative  Negative Final   • Tricyclic Antidepressants Screen 04/07/2023 Negative  Negative Final   • Methadone Screen, Urine 04/07/2023 Negative  Negative Final   • Barbiturates Screen, Urine 04/07/2023 Negative  Negative Final   • Oxycodone Screen, Urine 04/07/2023 Positive (A)  Negative Final   • Propoxyphene Screen 04/07/2023 Negative  Negative Final   • Buprenorphine, Screen, Urine 04/07/2023 Negative  Negative Final   • C-Reactive Protein 04/07/2023 1.26 (H)  0.00 - 0.50 mg/dL Final   • WBC 04/07/2023 6.23  3.40 - 10.80 10*3/mm3 Final   • RBC 04/07/2023 3.89  3.77 - 5.28  10*6/mm3 Final   • Hemoglobin 04/07/2023 11.3 (L)  12.0 - 15.9 g/dL Final   • Hematocrit 04/07/2023 34.2  34.0 - 46.6 % Final   • MCV 04/07/2023 87.9  79.0 - 97.0 fL Final   • MCH 04/07/2023 29.0  26.6 - 33.0 pg Final   • MCHC 04/07/2023 33.0  31.5 - 35.7 g/dL Final   • RDW 04/07/2023 18.6 (H)  12.3 - 15.4 % Final   • RDW-SD 04/07/2023 59.8 (H)  37.0 - 54.0 fl Final   • MPV 04/07/2023 8.4  6.0 - 12.0 fL Final   • Platelets 04/07/2023 262  140 - 450 10*3/mm3 Final   • Neutrophil % 04/07/2023 65.5  42.7 - 76.0 % Final   • Lymphocyte % 04/07/2023 23.3  19.6 - 45.3 % Final   • Monocyte % 04/07/2023 7.2  5.0 - 12.0 % Final   • Eosinophil % 04/07/2023 3.0  0.3 - 6.2 % Final   • Basophil % 04/07/2023 0.5  0.0 - 1.5 % Final   • Immature Grans % 04/07/2023 0.5  0.0 - 0.5 % Final   • Neutrophils, Absolute 04/07/2023 4.08  1.70 - 7.00 10*3/mm3 Final   • Lymphocytes, Absolute 04/07/2023 1.45  0.70 - 3.10 10*3/mm3 Final   • Monocytes, Absolute 04/07/2023 0.45  0.10 - 0.90 10*3/mm3 Final   • Eosinophils, Absolute 04/07/2023 0.19  0.00 - 0.40 10*3/mm3 Final   • Basophils, Absolute 04/07/2023 0.03  0.00 - 0.20 10*3/mm3 Final   • Immature Grans, Absolute 04/07/2023 0.03  0.00 - 0.05 10*3/mm3 Final   • nRBC 04/07/2023 0.0  0.0 - 0.2 /100 WBC Final   • RBC, UA 04/07/2023 Too Numerous to Count (A)  None Seen, 0-2 /HPF Final   • WBC, UA 04/07/2023 21-30 (A)  None Seen, 0-2 /HPF Final   • Bacteria, UA 04/07/2023 None Seen  None Seen /HPF Final   • Squamous Epithelial Cells, UA 04/07/2023 3-6 (A)  None Seen, 0-2 /HPF Final   • Hyaline Casts, UA 04/07/2023 None Seen  None Seen /LPF Final   • Methodology 04/07/2023 Automated Microscopy   Final   • Urine Culture 04/07/2023 50,000 CFU/mL Mixed Margo Isolated   Final   Office Visit on 03/28/2023   Component Date Value Ref Range Status   • Glucose 03/28/2023 165 (H)  65 - 99 mg/dL Final   • BUN 03/28/2023 19  6 - 20 mg/dL Final   • Creatinine 03/28/2023 0.69  0.57 - 1.00 mg/dL Final   • Sodium  03/28/2023 131 (L)  136 - 145 mmol/L Final   • Potassium 03/28/2023 4.4  3.5 - 5.2 mmol/L Final   • Chloride 03/28/2023 99  98 - 107 mmol/L Final   • CO2 03/28/2023 21.0 (L)  22.0 - 29.0 mmol/L Final   • Calcium 03/28/2023 9.7  8.6 - 10.5 mg/dL Final   • Total Protein 03/28/2023 8.3  6.0 - 8.5 g/dL Final   • Albumin 03/28/2023 4.4  3.5 - 5.2 g/dL Final   • ALT (SGPT) 03/28/2023 19  1 - 33 U/L Final   • AST (SGOT) 03/28/2023 23  1 - 32 U/L Final   • Alkaline Phosphatase 03/28/2023 132 (H)  39 - 117 U/L Final   • Total Bilirubin 03/28/2023 0.5  0.0 - 1.2 mg/dL Final   • Globulin 03/28/2023 3.9  gm/dL Final   • A/G Ratio 03/28/2023 1.1  g/dL Final   • BUN/Creatinine Ratio 03/28/2023 27.5 (H)  7.0 - 25.0 Final   • Anion Gap 03/28/2023 11.0  5.0 - 15.0 mmol/L Final   • eGFR 03/28/2023 115.5  >60.0 mL/min/1.73 Final   • Total Cholesterol 03/28/2023 260 (H)  0 - 200 mg/dL Final   • Triglycerides 03/28/2023 522 (H)  0 - 150 mg/dL Final   • HDL Cholesterol 03/28/2023 40  40 - 60 mg/dL Final   • LDL Cholesterol  03/28/2023 126 (H)  0 - 100 mg/dL Final   • VLDL Cholesterol 03/28/2023 94 (H)  5 - 40 mg/dL Final   • LDL/HDL Ratio 03/28/2023 2.89   Final   • TSH 03/28/2023 4.230 (H)  0.270 - 4.200 uIU/mL Final   • Hemoglobin A1C 03/28/2023 7.00 (H)  4.80 - 5.60 % Final   • 25 Hydroxy, Vitamin D 03/28/2023 33.5  30.0 - 100.0 ng/ml Final   • Uric Acid 03/28/2023 5.0  2.4 - 5.7 mg/dL Final   • Vitamin B-12 03/28/2023 702  211 - 946 pg/mL Final   • WBC 03/28/2023 6.33  3.40 - 10.80 10*3/mm3 Final   • RBC 03/28/2023 4.09  3.77 - 5.28 10*6/mm3 Final   • Hemoglobin 03/28/2023 11.7 (L)  12.0 - 15.9 g/dL Final   • Hematocrit 03/28/2023 36.1  34.0 - 46.6 % Final   • MCV 03/28/2023 88.3  79.0 - 97.0 fL Final   • MCH 03/28/2023 28.6  26.6 - 33.0 pg Final   • MCHC 03/28/2023 32.4  31.5 - 35.7 g/dL Final   • RDW 03/28/2023 21.7 (H)  12.3 - 15.4 % Final   • RDW-SD 03/28/2023 69.3 (H)  37.0 - 54.0 fl Final   • MPV 03/28/2023 9.2  6.0 - 12.0 fL  Final   • Platelets 03/28/2023 286  140 - 450 10*3/mm3 Final   • Neutrophil % 03/28/2023 51.2  42.7 - 76.0 % Final   • Lymphocyte % 03/28/2023 30.3  19.6 - 45.3 % Final   • Monocyte % 03/28/2023 8.7  5.0 - 12.0 % Final   • Eosinophil % 03/28/2023 8.7 (H)  0.3 - 6.2 % Final   • Basophil % 03/28/2023 0.9  0.0 - 1.5 % Final   • Immature Grans % 03/28/2023 0.2  0.0 - 0.5 % Final   • Neutrophils, Absolute 03/28/2023 3.24  1.70 - 7.00 10*3/mm3 Final   • Lymphocytes, Absolute 03/28/2023 1.92  0.70 - 3.10 10*3/mm3 Final   • Monocytes, Absolute 03/28/2023 0.55  0.10 - 0.90 10*3/mm3 Final   • Eosinophils, Absolute 03/28/2023 0.55 (H)  0.00 - 0.40 10*3/mm3 Final   • Basophils, Absolute 03/28/2023 0.06  0.00 - 0.20 10*3/mm3 Final   • Immature Grans, Absolute 03/28/2023 0.01  0.00 - 0.05 10*3/mm3 Final   • nRBC 03/28/2023 0.0  0.0 - 0.2 /100 WBC Final   • Microalbumin, Urine 03/28/2023 23.9  mg/dL Final   Admission on 03/21/2023, Discharged on 03/21/2023   Component Date Value Ref Range Status   • Glucose 03/21/2023 250 (H)  65 - 99 mg/dL Final   • BUN 03/21/2023 18  6 - 20 mg/dL Final   • Creatinine 03/21/2023 0.73  0.57 - 1.00 mg/dL Final   • Sodium 03/21/2023 132 (L)  136 - 145 mmol/L Final   • Potassium 03/21/2023 4.3  3.5 - 5.2 mmol/L Final    Slight hemolysis detected by analyzer. Results may be affected.   • Chloride 03/21/2023 98  98 - 107 mmol/L Final   • CO2 03/21/2023 18.9 (L)  22.0 - 29.0 mmol/L Final   • Calcium 03/21/2023 9.8  8.6 - 10.5 mg/dL Final   • Total Protein 03/21/2023 8.8 (H)  6.0 - 8.5 g/dL Final   • Albumin 03/21/2023 4.4  3.5 - 5.2 g/dL Final   • ALT (SGPT) 03/21/2023 18  1 - 33 U/L Final   • AST (SGOT) 03/21/2023 30  1 - 32 U/L Final   • Alkaline Phosphatase 03/21/2023 123 (H)  39 - 117 U/L Final   • Total Bilirubin 03/21/2023 0.4  0.0 - 1.2 mg/dL Final   • Globulin 03/21/2023 4.4  gm/dL Final   • A/G Ratio 03/21/2023 1.0  g/dL Final   • BUN/Creatinine Ratio 03/21/2023 24.7  7.0 - 25.0 Final   • Anion  Gap 03/21/2023 15.1 (H)  5.0 - 15.0 mmol/L Final   • eGFR 03/21/2023 109.5  >60.0 mL/min/1.73 Final   • Lactate 03/21/2023 3.5 (C)  0.5 - 2.0 mmol/L Final   • Lipase 03/21/2023 54  13 - 60 U/L Final   • HCG Qualitative 03/21/2023 Negative  Negative Final   • WBC 03/21/2023 5.39  3.40 - 10.80 10*3/mm3 Final   • RBC 03/21/2023 4.05  3.77 - 5.28 10*6/mm3 Final   • Hemoglobin 03/21/2023 11.4 (L)  12.0 - 15.9 g/dL Final   • Hematocrit 03/21/2023 35.8  34.0 - 46.6 % Final   • MCV 03/21/2023 88.4  79.0 - 97.0 fL Final   • MCH 03/21/2023 28.1  26.6 - 33.0 pg Final   • MCHC 03/21/2023 31.8  31.5 - 35.7 g/dL Final   • RDW 03/21/2023 21.7 (H)  12.3 - 15.4 % Final   • RDW-SD 03/21/2023 68.0 (H)  37.0 - 54.0 fl Final   • MPV 03/21/2023 8.2  6.0 - 12.0 fL Final   • Platelets 03/21/2023 277  140 - 450 10*3/mm3 Final   • Neutrophil % 03/21/2023 41.4 (L)  42.7 - 76.0 % Final   • Lymphocyte % 03/21/2023 31.7  19.6 - 45.3 % Final   • Monocyte % 03/21/2023 9.5  5.0 - 12.0 % Final   • Eosinophil % 03/21/2023 15.2 (H)  0.3 - 6.2 % Final   • Basophil % 03/21/2023 2.0 (H)  0.0 - 1.5 % Final   • Immature Grans % 03/21/2023 0.2  0.0 - 0.5 % Final   • Neutrophils, Absolute 03/21/2023 2.23  1.70 - 7.00 10*3/mm3 Final   • Lymphocytes, Absolute 03/21/2023 1.71  0.70 - 3.10 10*3/mm3 Final   • Monocytes, Absolute 03/21/2023 0.51  0.10 - 0.90 10*3/mm3 Final   • Eosinophils, Absolute 03/21/2023 0.82 (H)  0.00 - 0.40 10*3/mm3 Final   • Basophils, Absolute 03/21/2023 0.11  0.00 - 0.20 10*3/mm3 Final   • Immature Grans, Absolute 03/21/2023 0.01  0.00 - 0.05 10*3/mm3 Final   • nRBC 03/21/2023 0.0  0.0 - 0.2 /100 WBC Final        Procedure: None  Procedures     I have reviewed and agree with the above PMH, PSH, FMH, social history, medications, allergies, and labs.      Assessment/Plan:   Problem List Items Addressed This Visit        Genitourinary and Reproductive     Kidney stone    Relevant Medications    tamsulosin (FLOMAX) 0.4 MG capsule 24 hr  capsule    Other Relevant Orders    XR abdomen kub   Other Visit Diagnoses     Right flank pain    -  Primary    Relevant Medications    nitrofurantoin, macrocrystal-monohydrate, (Macrobid) 100 MG capsule    Other Relevant Orders    POC Urinalysis Dipstick, Automated (Completed)    Urinary tract infection with hematuria, site unspecified        Relevant Medications    nitrofurantoin, macrocrystal-monohydrate, (Macrobid) 100 MG capsule    Other Relevant Orders    Urine Culture - Urine, Urine, Clean Catch          Health Maintenance:   Health Maintenance Due   Topic Date Due   • COVID-19 Vaccine (1) Never done   • ANNUAL PHYSICAL  Never done   • DIABETIC FOOT EXAM  Never done   • PAP SMEAR  Never done   • DIABETIC EYE EXAM  Never done        Smoking Counseling: Never smoked or use smokeless tobacco    Urine Incontinence: Patient reports that she is not currently experiencing any symptoms of urinary incontinence.    Patient was given instructions and counseling regarding her condition or for health maintenance advice. Please see specific information pulled into the AVS if appropriate.    Patient Education:   Urinary tract infection -I discussed with the patient ways to help prevent urinary tract infection.  Advised patient to continue to increase water to 2 to 3 L/day.  Advised patient to take an over-the-counter probiotic to help with growth and control of normal urogenital hafsa.  Discussed with the patient the use of a nightly antibiotic to help prevent urinary tract infections.  Patient's last urine culture showed mixed hafsa growth only.  I will send her urine back off for culture analysis.  I will start her on Macrobid twice daily for 5 days.  I will call her with culture results once obtained.  Right kidney stone/flank pain -patient has had a persistent right intrarenal calculi measuring less than 3 mm on CT scan most recently on 5/11/2023.  Discussed with the patient spontaneous passage of stones less than 2 mm  in roughly 80% chance of passage of stones less than 5 mm.  Patient is concerned that stone is moving.  I will place a KUB for the patient to have done as soon as possible.  Advised her to alternate ibuprofen and Tylenol as needed.  Did recommend Toradol in office today however patient is allergic.  She is desirous of narcotics and would like Dr. Motley to send some in however informed her she will have to see him personally to have narcotic pain medication.  I will call patient with KUB results once obtained.  Otherwise advised patient return to clinic sooner if needed.  She can keep her follow-up appoint with Dr. Motley in July.    Visit Diagnoses:    ICD-10-CM ICD-9-CM   1. Right flank pain  R10.9 789.09   2. Urinary tract infection with hematuria, site unspecified  N39.0 599.0    R31.9 599.70   3. Kidney stone  N20.0 592.0       Meds Ordered During Visit:  New Medications Ordered This Visit   Medications   • nitrofurantoin, macrocrystal-monohydrate, (Macrobid) 100 MG capsule     Sig: Take 1 capsule by mouth 2 (Two) Times a Day.     Dispense:  10 capsule     Refill:  0   • tamsulosin (FLOMAX) 0.4 MG capsule 24 hr capsule     Sig: Take 1 capsule by mouth Daily.     Dispense:  30 capsule     Refill:  0       Follow Up Appointment: Follow-up with [facility] in July  No follow-ups on file.      This document has been electronically signed by Rocky Liz PA-C   May 22, 2023 14:50 EDT    Part of this note may be an electronic transcription/translation of spoken language to printed text using the Dragon Dictation System.

## 2023-05-23 LAB — BACTERIA SPEC AEROBE CULT: NORMAL

## 2023-05-24 ENCOUNTER — APPOINTMENT (OUTPATIENT)
Dept: GENERAL RADIOLOGY | Facility: HOSPITAL | Age: 38
End: 2023-05-24
Payer: COMMERCIAL

## 2023-05-24 ENCOUNTER — OFFICE VISIT (OUTPATIENT)
Dept: FAMILY MEDICINE CLINIC | Facility: CLINIC | Age: 38
End: 2023-05-24
Payer: COMMERCIAL

## 2023-05-24 ENCOUNTER — APPOINTMENT (OUTPATIENT)
Dept: CT IMAGING | Facility: HOSPITAL | Age: 38
End: 2023-05-24
Payer: COMMERCIAL

## 2023-05-24 ENCOUNTER — TELEPHONE (OUTPATIENT)
Dept: FAMILY MEDICINE CLINIC | Facility: CLINIC | Age: 38
End: 2023-05-24

## 2023-05-24 ENCOUNTER — PRIOR AUTHORIZATION (OUTPATIENT)
Dept: FAMILY MEDICINE CLINIC | Facility: CLINIC | Age: 38
End: 2023-05-24

## 2023-05-24 ENCOUNTER — HOSPITAL ENCOUNTER (EMERGENCY)
Facility: HOSPITAL | Age: 38
Discharge: HOME | End: 2023-05-24
Attending: STUDENT IN AN ORGANIZED HEALTH CARE EDUCATION/TRAINING PROGRAM | Admitting: STUDENT IN AN ORGANIZED HEALTH CARE EDUCATION/TRAINING PROGRAM
Payer: COMMERCIAL

## 2023-05-24 VITALS
SYSTOLIC BLOOD PRESSURE: 141 MMHG | RESPIRATION RATE: 18 BRPM | BODY MASS INDEX: 45 KG/M2 | WEIGHT: 280 LBS | HEART RATE: 86 BPM | HEIGHT: 66 IN | OXYGEN SATURATION: 97 % | DIASTOLIC BLOOD PRESSURE: 126 MMHG | TEMPERATURE: 97.7 F

## 2023-05-24 DIAGNOSIS — E11.40 TYPE 2 DIABETES MELLITUS WITH DIABETIC NEUROPATHY, WITH LONG-TERM CURRENT USE OF INSULIN: Primary | Chronic | ICD-10-CM

## 2023-05-24 DIAGNOSIS — J45.909 MODERATE ASTHMA, UNSPECIFIED WHETHER COMPLICATED, UNSPECIFIED WHETHER PERSISTENT: Chronic | ICD-10-CM

## 2023-05-24 DIAGNOSIS — R10.9 FLANK PAIN: Primary | ICD-10-CM

## 2023-05-24 DIAGNOSIS — Z79.4 TYPE 2 DIABETES MELLITUS WITH DIABETIC NEUROPATHY, WITH LONG-TERM CURRENT USE OF INSULIN: Primary | Chronic | ICD-10-CM

## 2023-05-24 DIAGNOSIS — I10 PRIMARY HYPERTENSION: Chronic | ICD-10-CM

## 2023-05-24 DIAGNOSIS — D68.51 FACTOR 5 LEIDEN MUTATION, HETEROZYGOUS: Chronic | ICD-10-CM

## 2023-05-24 DIAGNOSIS — Z86.711 HISTORY OF PULMONARY EMBOLISM: ICD-10-CM

## 2023-05-24 DIAGNOSIS — E03.9 HYPOTHYROIDISM, UNSPECIFIED TYPE: Chronic | ICD-10-CM

## 2023-05-24 DIAGNOSIS — R05.9 COUGH, UNSPECIFIED TYPE: ICD-10-CM

## 2023-05-24 DIAGNOSIS — F33.0 MAJOR DEPRESSIVE DISORDER, RECURRENT EPISODE, MILD DEGREE: Chronic | ICD-10-CM

## 2023-05-24 DIAGNOSIS — E78.5 DYSLIPIDEMIA: Chronic | ICD-10-CM

## 2023-05-24 DIAGNOSIS — E53.8 VITAMIN B 12 DEFICIENCY: ICD-10-CM

## 2023-05-24 LAB
ALBUMIN SERPL-MCNC: 4.4 G/DL (ref 3.5–5.2)
ALBUMIN/GLOB SERPL: 1.3 G/DL
ALP SERPL-CCNC: 100 U/L (ref 39–117)
ALT SERPL W P-5'-P-CCNC: 36 U/L (ref 1–33)
ANION GAP SERPL CALCULATED.3IONS-SCNC: 15.3 MMOL/L (ref 5–15)
APTT PPP: 28.8 SECONDS (ref 26.5–34.5)
AST SERPL-CCNC: 32 U/L (ref 1–32)
B-HCG UR QL: NEGATIVE
BACTERIA UR QL AUTO: ABNORMAL /HPF
BASOPHILS # BLD AUTO: 0.03 10*3/MM3 (ref 0–0.2)
BASOPHILS NFR BLD AUTO: 0.6 % (ref 0–1.5)
BILIRUB SERPL-MCNC: 0.3 MG/DL (ref 0–1.2)
BILIRUB UR QL STRIP: NEGATIVE
BUN SERPL-MCNC: 6 MG/DL (ref 6–20)
BUN/CREAT SERPL: 10.5 (ref 7–25)
CALCIUM SPEC-SCNC: 9.6 MG/DL (ref 8.6–10.5)
CHLORIDE SERPL-SCNC: 103 MMOL/L (ref 98–107)
CLARITY UR: ABNORMAL
CO2 SERPL-SCNC: 18.7 MMOL/L (ref 22–29)
COLOR UR: YELLOW
CREAT SERPL-MCNC: 0.57 MG/DL (ref 0.57–1)
CRP SERPL-MCNC: 2.46 MG/DL (ref 0–0.5)
DEPRECATED RDW RBC AUTO: 45.1 FL (ref 37–54)
EGFRCR SERPLBLD CKD-EPI 2021: 120.2 ML/MIN/1.73
EOSINOPHIL # BLD AUTO: 0.09 10*3/MM3 (ref 0–0.4)
EOSINOPHIL NFR BLD AUTO: 1.9 % (ref 0.3–6.2)
ERYTHROCYTE [DISTWIDTH] IN BLOOD BY AUTOMATED COUNT: 13.2 % (ref 12.3–15.4)
FLUAV RNA RESP QL NAA+PROBE: NOT DETECTED
FLUBV RNA RESP QL NAA+PROBE: NOT DETECTED
GEN 5 2HR TROPONIN T REFLEX: 14 NG/L
GLOBULIN UR ELPH-MCNC: 3.3 GM/DL
GLUCOSE SERPL-MCNC: 212 MG/DL (ref 65–99)
GLUCOSE UR STRIP-MCNC: NEGATIVE MG/DL
HCT VFR BLD AUTO: 37.3 % (ref 34–46.6)
HGB BLD-MCNC: 12.5 G/DL (ref 12–15.9)
HGB UR QL STRIP.AUTO: ABNORMAL
HYALINE CASTS UR QL AUTO: ABNORMAL /LPF
IMM GRANULOCYTES # BLD AUTO: 0.02 10*3/MM3 (ref 0–0.05)
IMM GRANULOCYTES NFR BLD AUTO: 0.4 % (ref 0–0.5)
INR PPP: 1.01 (ref 0.9–1.1)
KETONES UR QL STRIP: NEGATIVE
LEUKOCYTE ESTERASE UR QL STRIP.AUTO: ABNORMAL
LYMPHOCYTES # BLD AUTO: 1.43 10*3/MM3 (ref 0.7–3.1)
LYMPHOCYTES NFR BLD AUTO: 31 % (ref 19.6–45.3)
MCH RBC QN AUTO: 31.4 PG (ref 26.6–33)
MCHC RBC AUTO-ENTMCNC: 33.5 G/DL (ref 31.5–35.7)
MCV RBC AUTO: 93.7 FL (ref 79–97)
MONOCYTES # BLD AUTO: 0.45 10*3/MM3 (ref 0.1–0.9)
MONOCYTES NFR BLD AUTO: 9.7 % (ref 5–12)
NEUTROPHILS NFR BLD AUTO: 2.6 10*3/MM3 (ref 1.7–7)
NEUTROPHILS NFR BLD AUTO: 56.4 % (ref 42.7–76)
NITRITE UR QL STRIP: NEGATIVE
NRBC BLD AUTO-RTO: 0 /100 WBC (ref 0–0.2)
PH UR STRIP.AUTO: 5.5 [PH] (ref 5–8)
PLATELET # BLD AUTO: 210 10*3/MM3 (ref 140–450)
PMV BLD AUTO: 8.9 FL (ref 6–12)
POTASSIUM SERPL-SCNC: 4.2 MMOL/L (ref 3.5–5.2)
PROT SERPL-MCNC: 7.7 G/DL (ref 6–8.5)
PROT UR QL STRIP: ABNORMAL
PROTHROMBIN TIME: 13.8 SECONDS (ref 12.1–14.7)
QT INTERVAL: 384 MS
QTC INTERVAL: 474 MS
RBC # BLD AUTO: 3.98 10*6/MM3 (ref 3.77–5.28)
RBC # UR STRIP: ABNORMAL /HPF
REF LAB TEST METHOD: ABNORMAL
SARS-COV-2 RNA RESP QL NAA+PROBE: NOT DETECTED
SODIUM SERPL-SCNC: 137 MMOL/L (ref 136–145)
SP GR UR STRIP: 1.01 (ref 1–1.03)
SQUAMOUS #/AREA URNS HPF: ABNORMAL /HPF
TROPONIN T DELTA: 0 NG/L
TROPONIN T SERPL HS-MCNC: 14 NG/L
UROBILINOGEN UR QL STRIP: ABNORMAL
WBC # UR STRIP: ABNORMAL /HPF
WBC NRBC COR # BLD: 4.62 10*3/MM3 (ref 3.4–10.8)

## 2023-05-24 PROCEDURE — 87086 URINE CULTURE/COLONY COUNT: CPT | Performed by: NURSE PRACTITIONER

## 2023-05-24 PROCEDURE — 85730 THROMBOPLASTIN TIME PARTIAL: CPT | Performed by: NURSE PRACTITIONER

## 2023-05-24 PROCEDURE — 87636 SARSCOV2 & INF A&B AMP PRB: CPT | Performed by: NURSE PRACTITIONER

## 2023-05-24 PROCEDURE — 71045 X-RAY EXAM CHEST 1 VIEW: CPT

## 2023-05-24 PROCEDURE — 25010000002 MORPHINE PER 10 MG: Performed by: NURSE PRACTITIONER

## 2023-05-24 PROCEDURE — 71045 X-RAY EXAM CHEST 1 VIEW: CPT | Performed by: RADIOLOGY

## 2023-05-24 PROCEDURE — 3077F SYST BP >= 140 MM HG: CPT | Performed by: NURSE PRACTITIONER

## 2023-05-24 PROCEDURE — 71275 CT ANGIOGRAPHY CHEST: CPT

## 2023-05-24 PROCEDURE — 86140 C-REACTIVE PROTEIN: CPT | Performed by: NURSE PRACTITIONER

## 2023-05-24 PROCEDURE — 80053 COMPREHEN METABOLIC PANEL: CPT | Performed by: NURSE PRACTITIONER

## 2023-05-24 PROCEDURE — 1159F MED LIST DOCD IN RCRD: CPT | Performed by: NURSE PRACTITIONER

## 2023-05-24 PROCEDURE — 81025 URINE PREGNANCY TEST: CPT | Performed by: NURSE PRACTITIONER

## 2023-05-24 PROCEDURE — 84484 ASSAY OF TROPONIN QUANT: CPT | Performed by: NURSE PRACTITIONER

## 2023-05-24 PROCEDURE — 71275 CT ANGIOGRAPHY CHEST: CPT | Performed by: RADIOLOGY

## 2023-05-24 PROCEDURE — 1160F RVW MEDS BY RX/DR IN RCRD: CPT | Performed by: NURSE PRACTITIONER

## 2023-05-24 PROCEDURE — 96374 THER/PROPH/DIAG INJ IV PUSH: CPT

## 2023-05-24 PROCEDURE — 81001 URINALYSIS AUTO W/SCOPE: CPT | Performed by: NURSE PRACTITIONER

## 2023-05-24 PROCEDURE — 85025 COMPLETE CBC W/AUTO DIFF WBC: CPT | Performed by: NURSE PRACTITIONER

## 2023-05-24 PROCEDURE — 96375 TX/PRO/DX INJ NEW DRUG ADDON: CPT

## 2023-05-24 PROCEDURE — 99283 EMERGENCY DEPT VISIT LOW MDM: CPT

## 2023-05-24 PROCEDURE — 25010000002 ONDANSETRON PER 1 MG: Performed by: NURSE PRACTITIONER

## 2023-05-24 PROCEDURE — 99214 OFFICE O/P EST MOD 30 MIN: CPT | Performed by: NURSE PRACTITIONER

## 2023-05-24 PROCEDURE — 93005 ELECTROCARDIOGRAM TRACING: CPT | Performed by: NURSE PRACTITIONER

## 2023-05-24 PROCEDURE — 25510000001 IOPAMIDOL PER 1 ML: Performed by: STUDENT IN AN ORGANIZED HEALTH CARE EDUCATION/TRAINING PROGRAM

## 2023-05-24 PROCEDURE — 36415 COLL VENOUS BLD VENIPUNCTURE: CPT

## 2023-05-24 PROCEDURE — 3051F HG A1C>EQUAL 7.0%<8.0%: CPT | Performed by: NURSE PRACTITIONER

## 2023-05-24 PROCEDURE — 3080F DIAST BP >= 90 MM HG: CPT | Performed by: NURSE PRACTITIONER

## 2023-05-24 PROCEDURE — 85610 PROTHROMBIN TIME: CPT | Performed by: NURSE PRACTITIONER

## 2023-05-24 RX ORDER — BLOOD-GLUCOSE SENSOR
1 EACH MISCELLANEOUS DAILY
Qty: 9 EACH | Refills: 0 | Status: SHIPPED | OUTPATIENT
Start: 2023-05-24

## 2023-05-24 RX ORDER — ONDANSETRON 2 MG/ML
4 INJECTION INTRAMUSCULAR; INTRAVENOUS ONCE
Status: COMPLETED | OUTPATIENT
Start: 2023-05-24 | End: 2023-05-24

## 2023-05-24 RX ORDER — BLOOD-GLUCOSE,RECEIVER,CONT
1 EACH MISCELLANEOUS DAILY
Qty: 1 EACH | Refills: 0 | Status: SHIPPED | OUTPATIENT
Start: 2023-05-24

## 2023-05-24 RX ORDER — SODIUM CHLORIDE 0.9 % (FLUSH) 0.9 %
10 SYRINGE (ML) INJECTION AS NEEDED
Status: DISCONTINUED | OUTPATIENT
Start: 2023-05-24 | End: 2023-05-24 | Stop reason: HOSPADM

## 2023-05-24 RX ADMIN — IOPAMIDOL 86 ML: 755 INJECTION, SOLUTION INTRAVENOUS at 12:53

## 2023-05-24 RX ADMIN — MORPHINE SULFATE 4 MG: 4 INJECTION, SOLUTION INTRAMUSCULAR; INTRAVENOUS at 11:04

## 2023-05-24 RX ADMIN — ONDANSETRON 4 MG: 2 INJECTION INTRAMUSCULAR; INTRAVENOUS at 11:02

## 2023-05-24 NOTE — PROGRESS NOTES
History of Present Illness  Natalia Arzate is a 38 y.o. female who presents to the clinic for follow-up.  Natalia was evaluated yesterday at [facility] ED for shortness of breath/chest pain.  The recommendation was a CT of the chest PE protocol and admission which she declined.  She does report  she has missed several doses of Eliquis over the last week. This ED record has been reviewed.  She does report that she has continued to have shortness of breath and chest pain.  She was hospitalized at [facility] from February 24 through March 10, 2023  due to a left Retroperitoneal abscess.  She was discharged with a PICC line and IV Ceftriaxone which she completed on March 21st. Natalia has been diagnosed with Factor V Leiden, DM, type 2 with neuropathy currently treated with insulin.  In addition, she has HTN, Dyslipidemia, Hypothyroidism, Renal Stones and Gout.     She was hospitalized at [facility] from 12/26/2021 until 2/15/2022.  She was admitted for a disseminated infection.  She presented emergently at [facility] on 12/26/2021 following a fall at home. She was transferred to . She was  diagnosed with L Pyelonephritis complicated by multiple abscesses as well as superinfection of L iliopsoas hematoma on admission at [facility]. During admission, she developed acute hypoxic respiratory failure presumed due to aspiration and septic shock. She was transferred to ICU. She was intubated and required CRRT/HD while in ICU. Eventually transferred to the floor no longer requiring HD. She does have Oxygen at her home which she uses intermittently when she develops shortness of air.  During hospitalization, she had an acute/subacute R cerebellar ischemic stroke on 1/26/2022 with recommendation to continue on Anticoagulation.     Diabetes  She has type 2 diabetes mellitus treated with insulin both basal and fast acting.OmniPod has been prescribed which was denied by her insurance.  She is using a  DexCom which is helping her.  Risk factors for coronary artery disease include diabetes mellitus, dyslipidemia, hypertension, obesity, stress and sedentary lifestyle.     Lab Results   Component Value Date    HGBA1C 9 12/01/2022     Lab Results   Component Value Date    HGBA1C 8.9 12/01/2022     Lab Results   Component Value Date    HGBA1C 7.00 (H) 03/28/2023     Hypertension  Currently taking Amlodipine and Metoprolol.      Lab Results   Component Value Date    CREATININE 0.57 05/24/2023     Dyslipidemia  Previously prescribed Omega which she has not been taking. Rosuvastatin has been prescribed but not started.      Lab Results   Component Value Date    CHOL 260 (H) 03/28/2023    CHOL 142 01/12/2023    CHOL 157 12/01/2022     Lab Results   Component Value Date    TRIG 522 (H) 03/28/2023    TRIG 219 (H) 01/12/2023    TRIG 243 (H) 12/01/2022     Lab Results   Component Value Date    HDL 40 03/28/2023    HDL 18 (L) 01/12/2023    HDL 22 (L) 12/01/2022     Lab Results   Component Value Date     (H) 03/28/2023    LDL 87 01/12/2023    LDL 93 12/01/2022     Hypothyroidism  Had previously been prescribed Levothyroxine but her last TSH was in range without medication  Lab Results   Component Value Date    TSH 4.230 (H) 03/28/2023     Kidney Disease  Previous Hydronephrosis and obstructing stone as well as nonobstructing renal stones.  She has had multiple procedures per urologist, Dr. Motley and  medical team.  Left nephrectomy per  medical urology team in October 2022.    The following portions of the patient's history were reviewed and updated as appropriate: allergies, current medications, past family history, past medical history, past social history, past surgical history and problem list.    Review of Systems   Constitutional: Negative for activity change, appetite change, chills, fatigue, fever and unexpected weight change.   HENT: Negative for congestion, ear discharge, ear pain, postnasal drip,  "rhinorrhea, sinus pressure, sinus pain and voice change.    Eyes: Negative for pain, discharge, redness, itching and visual disturbance.   Respiratory: Positive for cough (Intermittent), shortness of breath and wheezing.    Cardiovascular: Positive for leg swelling (Intermittent). Negative for chest pain and palpitations.   Gastrointestinal: Negative for nausea and vomiting.   Endocrine: Negative for cold intolerance, heat intolerance, polydipsia, polyphagia and polyuria.   Genitourinary: Positive for flank pain (Evaluated by ED and Urology).   Musculoskeletal: Positive for arthralgias and gait problem.   Skin: Negative for color change and rash.   Allergic/Immunologic: Positive for environmental allergies.   Neurological: Positive for numbness (Left foot numbness secondary to CVA) and headaches. Negative for dizziness, tremors, speech difficulty, weakness and light-headedness.   Hematological: Negative for adenopathy.   Psychiatric/Behavioral: Negative for confusion and decreased concentration. The patient is nervous/anxious.    All other systems reviewed and are negative.    Vital signs:  /94 (BP Location: Right arm, Patient Position: Sitting, Cuff Size: Adult)   Pulse 96   Temp 98 °F (36.7 °C) (Temporal)   Resp 14   Ht 167.6 cm (65.98\")   Wt 130 kg (286 lb)   LMP 10/31/2017   SpO2 100%   BMI 46.18 kg/m²     Physical Exam  Vitals and nursing note reviewed.   Constitutional:       General: She is not in acute distress.     Appearance: She is well-developed.      Comments: Sitting in a wheelchair.  Her daughter is with her.   HENT:      Head: Normocephalic.      Nose:      Right Sinus: No maxillary sinus tenderness or frontal sinus tenderness.      Left Sinus: No maxillary sinus tenderness or frontal sinus tenderness.      Mouth/Throat:      Mouth: Mucous membranes are moist.   Eyes:      General: No scleral icterus.        Right eye: No discharge.         Left eye: No discharge.      " Conjunctiva/sclera: Conjunctivae normal.   Cardiovascular:      Rate and Rhythm: Normal rate and regular rhythm.      Heart sounds: Normal heart sounds. No murmur heard.    No friction rub.   Pulmonary:      Effort: Pulmonary effort is normal. No respiratory distress.      Breath sounds: No wheezing or rales.   Abdominal:      General: There is no distension.      Palpations: Abdomen is soft.      Tenderness: There is no abdominal tenderness.   Musculoskeletal:         General: Tenderness present.      Cervical back: Neck supple.      Right lower leg: No edema.      Left lower leg: No edema.   Lymphadenopathy:      Cervical: No cervical adenopathy.   Skin:     General: Skin is warm and dry.      Capillary Refill: Capillary refill takes less than 2 seconds.      Findings: No erythema or rash.   Neurological:      Mental Status: She is alert and oriented to person, place, and time.   Psychiatric:         Mood and Affect: Mood and affect normal.         Speech: Speech normal.         Behavior: Behavior normal.         Thought Content: Thought content normal.         Cognition and Memory: Cognition and memory normal.       Result Review : Emergency room visits  Class 3 Severe Obesity (BMI >=40). Obesity-related health conditions include the following: hypertension, diabetes mellitus, dyslipidemias and GERD. Obesity is Increased . BMI  is above average; BMI management plan is completed.Provided information on portion control and increasing exercise.     Assessment & Plan     Diagnoses and all orders for this visit:    1. Type 2 diabetes mellitus with diabetic neuropathy, with long-term current use of insulin (Primary)  Comments:  Continue Lantus and sliding scale which is scanned into her chart.  Orders:  -     Continuous Blood Gluc Sensor (Dexcom G7 Sensor) misc; 1 Device Daily.  Dispense: 9 each; Refill: 0  -     Continuous Blood Gluc  (Dexcom G7 ) device; 1 Device Daily.  Dispense: 1 each; Refill: 0  -      Lancets Micro Thin 33G misc; 1 Device 3 (Three) Times a Day.  Dispense: 100 each; Refill: 3  -     gabapentin (NEURONTIN) 300 MG capsule; Take 1 capsule by mouth At Night As Needed (neuropathy) for up to 30 days.  Dispense: 30 capsule; Refill: 0    2. Primary hypertension  Comments:  Not at goal.  Continue amlodipine and metoprolol.  Will consider increasing metoprolol to 100 at next visit    3. Dyslipidemia  Comments:  Reviewed her lipid panel with her.  Encouraged her to start rosuvastatin    4. Hypothyroidism, unspecified type  Comments:  Continue to monitor    5. Factor 5 Leiden mutation, heterozygous  Comments:  Follow-up with hematology.  Continue Eliquis    6. Moderate asthma, unspecified whether complicated, unspecified whether persistent  Comments:  Continue Alb Inhaler prn, Singulair. Avoidance of triggers   Orders:  -     mometasone-formoterol (DULERA 200) 200-5 MCG/ACT inhaler; 2 puffs twice daily  Dispense: 1 g; Refill: 2    7. Major depressive disorder, recurrent episode, mild degree  Comments:  Continue Cymbalta.    Orders:  -     DULoxetine (CYMBALTA) 30 MG capsule; Take 1 capsule by mouth 2 (Two) Times a Day.  Dispense: 60 capsule; Refill: 2    8. Vitamin B 12 deficiency  Comments:  Continue vitamin B12  Orders:  -     cyanocobalamin ([employer] Vitamin B-12) 2000 MCG tablet; Take 1 tablet by mouth Daily.  Dispense: 30 tablet; Refill: 2    9. History of pulmonary embolism  Comments:  Recommend patient to return to [facility] for further evaluation    10. Cough, unspecified type  Comments:  Promethazine DM and counseled regarding supportive care measures  Orders:  -     promethazine-dextromethorphan (PROMETHAZINE-DM) 6.25-15 MG/5ML syrup; Take 5 mL by mouth 2 (Two) Times a Day As Needed for Cough.  Dispense: 120 mL; Refill: 0    Follow Up In July  Findings and recommendations discussed with Natalia. Reviewed treatment options.  Natalia is in agreement to return to [facility] for  further evaluation. Lifestyle modifications reinforced including nutrition and activity recommendations.  [facility] will follow up in July sooner if problems/concerns occur.  Natalia was given instructions and counseling regarding her condition or for health maintenance advice. Please see specific information pulled into the AVS if appropriate    This document has been electronically signed by:

## 2023-05-24 NOTE — TELEPHONE ENCOUNTER
PATIENT STATED THE PHARMACY SAID THEY DID NOT RECEIVE THE MEDICATIONS THIS MORNING   THEY SAID THE DEXCOM WILL NEED A PRIOR AUTHORIZATION     PATIENT CALL BACK [phone]    [facility]

## 2023-05-24 NOTE — DISCHARGE INSTRUCTIONS
Follow up with your primary care provider in  2-3 days.    Return to the emergency room for worsening symptoms.

## 2023-05-24 NOTE — ED PROVIDER NOTES
Subjective   History of Present Illness  Patient is 38-year-old female presents emerged today with complaint of right flank pain.  Patient was seen by primary care provider today with concern that she may have a pulmonary embolism.  Patient reports missing a couple doses of her blood thinner.  Patient reports a history of pulmonary embolism.  Patient reports last night she noticed that her oxygen dropped lobate and states that her heart rate was up some.  Patient also reports right flank pain with a history of ureterolithiasis.  Patient denies any alleviating worsening factors.  Patient denies any interventions.    Flank Pain      Review of Systems   Constitutional: Negative.    HENT: Negative.    Eyes: Negative.    Respiratory: Negative.    Cardiovascular: Negative.    Gastrointestinal: Negative.    Endocrine: Negative.    Genitourinary: Positive for flank pain.   Skin: Negative.    Allergic/Immunologic: Negative.    Neurological: Negative.    Hematological: Negative.    Psychiatric/Behavioral: Negative.        Past Medical History:   Diagnosis Date   • A-fib    • Abnormal ECG    • Anemia    • Anxiety    • Asthma    • Cancer     Ovarian   • Depression    • Diabetes mellitus    • DVT (deep venous thrombosis)    • Factor 5 Leiden mutation, heterozygous    • Fibroid    • GERD (gastroesophageal reflux disease)    • Gout    • H/O abdominal abscess    • History of sepsis    • History of transfusion    • Hyperlipidemia    • Hypothyroid    • Kidney stone    • Migraines    • Neuropathy    • Ovarian cancer 02/2021   • Ovarian cyst    • PE (pulmonary embolism)    • Polycystic ovary syndrome    • Preeclampsia    • Rh incompatibility    • Stroke    • TIA (transient ischemic attack)    • Urinary tract infection    • Varicella        Allergies   Allergen Reactions   • Toradol [Ketorolac Tromethamine] Anaphylaxis and Hives   • Haldol [Haloperidol] Hives and Mental Status Change   • Tramadol Hives and Swelling   • Amoxicillin Hives  and Rash   • Penicillins Hives and Rash       Past Surgical History:   Procedure Laterality Date   • ABDOMINAL SURGERY     • CARDIAC CATHETERIZATION     •  SECTION     • CHOLECYSTECTOMY     • COLONOSCOPY     • ENDOSCOPY     • EXTRACORPOREAL SHOCK WAVE LITHOTRIPSY (ESWL) Left 10/22/2021    Procedure: EXTRACORPOREAL SHOCKWAVE LITHOTRIPSY;  Surgeon: Milan Motley MD;  Location: [facility];  Service: Urology;  Laterality: Left;   • LITHOTRIPSY     • RIGHT OOPHORECTOMY     • URETEROSCOPY LASER LITHOTRIPSY WITH STENT INSERTION Left 10/01/2021    Procedure: URETEROSCOPY WITH STENT PLACEMENT;  Surgeon: Milan Motley MD;  Location: [facility];  Service: Urology;  Laterality: Left;   • URETEROSCOPY LASER LITHOTRIPSY WITH STENT INSERTION Left 2022    Procedure: URETEROSCOPY STENT REMOVAL;  Surgeon: Milan Motley MD;  Location: [facility];  Service: Urology;  Laterality: Left;   • URETEROSCOPY LASER LITHOTRIPSY WITH STENT INSERTION Left 2022    Procedure: CYSTOSCOPY RETROGRADE LEFT WITH STENT PLACEMENT;  Surgeon: Milan Motley MD;  Location: [facility];  Service: Urology;  Laterality: Left;       Family History   Problem Relation Age of Onset   • Diabetes Father    • Hypertension Father    • Heart attack Father    • Hypertension Mother    • No Known Problems Paternal Grandmother    • No Known Problems Paternal Grandfather    • No Known Problems Maternal Grandmother    • No Known Problems Maternal Grandfather    • No Known Problems Sister    • No Known Problems Brother    • No Known Problems Daughter    • No Known Problems Son    • No Known Problems Cousin    • Rheum arthritis Neg Hx    • Osteoarthritis Neg Hx    • Asthma Neg Hx    • Heart failure Neg Hx    • Hyperlipidemia Neg Hx    • Migraines Neg Hx    • Rashes / Skin problems Neg Hx    • Seizures Neg Hx    • Stroke Neg Hx    • Thyroid disease Neg Hx        Social History     Socioeconomic History   • Marital status:  "   Tobacco Use   • Smoking status: Never     Passive exposure: Never   • Smokeless tobacco: Never   Vaping Use   • Vaping Use: Never used   Substance and Sexual Activity   • Alcohol use: No   • Drug use: Never     Comment: Pt has track marks on right arm. Pt states \"It's from my INR being drawn.\"    • Sexual activity: Not Currently     Partners: Male     Birth control/protection: None           Objective   Physical Exam  Vitals and nursing note reviewed.   Constitutional:       Appearance: She is well-developed.   HENT:      Head: Normocephalic.      Right Ear: External ear normal.      Left Ear: External ear normal.   Eyes:      Conjunctiva/sclera: Conjunctivae normal.      Pupils: Pupils are equal, round, and reactive to light.   Cardiovascular:      Rate and Rhythm: Normal rate and regular rhythm.      Heart sounds: Normal heart sounds.   Pulmonary:      Effort: Pulmonary effort is normal.      Breath sounds: Normal breath sounds.   Abdominal:      General: Bowel sounds are normal.      Palpations: Abdomen is soft.   Musculoskeletal:         General: Normal range of motion.      Cervical back: Normal range of motion and neck supple.   Skin:     General: Skin is warm and dry.      Capillary Refill: Capillary refill takes less than 2 seconds.   Neurological:      Mental Status: She is alert and oriented to person, place, and time.   Psychiatric:         Behavior: Behavior normal.         Thought Content: Thought content normal.         Procedures           ED Course  ED Course as of 05/24/23 1401   Wed May 24, 2023   1212 EKG none sinus rhythm.  First-degree AV block noted.  92 bpm.  .  No acute ST elevation.    Electronically signed by David Mccoy DO, 05/24/23, 12:13 PM EDT.   [SF]      ED Course User Index  [SF] David Mccoy DO                Results for orders placed or performed during the hospital encounter of 05/24/23   COVID-19 and FLU A/B PCR - Swab, Nasopharynx    Specimen: " Nasopharynx; Swab   Result Value Ref Range    COVID19 Not Detected Not Detected - Ref. Range    Influenza A PCR Not Detected Not Detected    Influenza B PCR Not Detected Not Detected   Comprehensive Metabolic Panel    Specimen: Blood   Result Value Ref Range    Glucose 212 (H) 65 - 99 mg/dL    BUN 6 6 - 20 mg/dL    Creatinine 0.57 0.57 - 1.00 mg/dL    Sodium 137 136 - 145 mmol/L    Potassium 4.2 3.5 - 5.2 mmol/L    Chloride 103 98 - 107 mmol/L    CO2 18.7 (L) 22.0 - 29.0 mmol/L    Calcium 9.6 8.6 - 10.5 mg/dL    Total Protein 7.7 6.0 - 8.5 g/dL    Albumin 4.4 3.5 - 5.2 g/dL    ALT (SGPT) 36 (H) 1 - 33 U/L    AST (SGOT) 32 1 - 32 U/L    Alkaline Phosphatase 100 39 - 117 U/L    Total Bilirubin 0.3 0.0 - 1.2 mg/dL    Globulin 3.3 gm/dL    A/G Ratio 1.3 g/dL    BUN/Creatinine Ratio 10.5 7.0 - 25.0    Anion Gap 15.3 (H) 5.0 - 15.0 mmol/L    eGFR 120.2 >60.0 mL/min/1.73   aPTT    Specimen: Blood   Result Value Ref Range    PTT 28.8 26.5 - 34.5 seconds   Protime-INR    Specimen: Blood   Result Value Ref Range    Protime 13.8 12.1 - 14.7 Seconds    INR 1.01 0.90 - 1.10   High Sensitivity Troponin T    Specimen: Blood   Result Value Ref Range    HS Troponin T 14 (H) <10 ng/L   C-reactive Protein    Specimen: Blood   Result Value Ref Range    C-Reactive Protein 2.46 (H) 0.00 - 0.50 mg/dL   CBC Auto Differential    Specimen: Blood   Result Value Ref Range    WBC 4.62 3.40 - 10.80 10*3/mm3    RBC 3.98 3.77 - 5.28 10*6/mm3    Hemoglobin 12.5 12.0 - 15.9 g/dL    Hematocrit 37.3 34.0 - 46.6 %    MCV 93.7 79.0 - 97.0 fL    MCH 31.4 26.6 - 33.0 pg    MCHC 33.5 31.5 - 35.7 g/dL    RDW 13.2 12.3 - 15.4 %    RDW-SD 45.1 37.0 - 54.0 fl    MPV 8.9 6.0 - 12.0 fL    Platelets 210 140 - 450 10*3/mm3    Neutrophil % 56.4 42.7 - 76.0 %    Lymphocyte % 31.0 19.6 - 45.3 %    Monocyte % 9.7 5.0 - 12.0 %    Eosinophil % 1.9 0.3 - 6.2 %    Basophil % 0.6 0.0 - 1.5 %    Immature Grans % 0.4 0.0 - 0.5 %    Neutrophils, Absolute 2.60 1.70 - 7.00  10*3/mm3    Lymphocytes, Absolute 1.43 0.70 - 3.10 10*3/mm3    Monocytes, Absolute 0.45 0.10 - 0.90 10*3/mm3    Eosinophils, Absolute 0.09 0.00 - 0.40 10*3/mm3    Basophils, Absolute 0.03 0.00 - 0.20 10*3/mm3    Immature Grans, Absolute 0.02 0.00 - 0.05 10*3/mm3    nRBC 0.0 0.0 - 0.2 /100 WBC   High Sensitivity Troponin T 2Hr    Specimen: Arm, Left; Blood   Result Value Ref Range    HS Troponin T 14 (H) <10 ng/L    Troponin T Delta 0 >=-4 - <+4 ng/L   Urinalysis With Culture If Indicated - Urine, Clean Catch    Specimen: Urine, Clean Catch   Result Value Ref Range    Color, UA Yellow Yellow, Straw    Appearance, UA Cloudy (A) Clear    pH, UA 5.5 5.0 - 8.0    Specific Gravity, UA 1.015 1.005 - 1.030    Glucose, UA Negative Negative    Ketones, UA Negative Negative    Bilirubin, UA Negative Negative    Blood, UA Large (3+) (A) Negative    Protein, UA 30 mg/dL (1+) (A) Negative    Leuk Esterase, UA Trace (A) Negative    Nitrite, UA Negative Negative    Urobilinogen, UA 0.2 E.U./dL 0.2 - 1.0 E.U./dL   Pregnancy, Urine - Urine, Clean Catch    Specimen: Urine, Clean Catch   Result Value Ref Range    HCG, Urine QL Negative Negative   Urinalysis, Microscopic Only - Urine, Clean Catch    Specimen: Urine, Clean Catch   Result Value Ref Range    RBC, UA Too Numerous to Count (A) None Seen, 0-2 /HPF    WBC, UA 6-12 (A) None Seen, 0-2 /HPF    Bacteria, UA None Seen None Seen /HPF    Squamous Epithelial Cells, UA 0-2 None Seen, 0-2 /HPF    Hyaline Casts, UA None Seen None Seen /LPF    Methodology Automated Microscopy    ECG 12 Lead Chest Pain   Result Value Ref Range    QT Interval 384 ms    QTC Interval 474 ms     CT Angiogram Chest Pulmonary Embolism   Final Result     Mild cardiomegaly.       This report was finalized on 5/24/2023 12:54 PM by Dr. Pj Lee MD.          XR Chest AP   Final Result     Unremarkable exam. No acute cardiopulmonary findings identified.       This report was finalized on 5/24/2023 11:44 AM by   Pj Lee MD.                                       Medical Decision Making  Patient is 38-year-old female presents emerged today with complaint of right flank pain.  Patient was seen by primary care provider today with concern that she may have a pulmonary embolism.  Patient reports missing a couple doses of her blood thinner.  Patient reports a history of pulmonary embolism.  Patient reports last night she noticed that her oxygen dropped lobate and states that her heart rate was up some.  Patient also reports right flank pain with a history of ureterolithiasis.  Patient denies any alleviating worsening factors.  Patient denies any interventions.    Flank pain: acute illness or injury  Amount and/or Complexity of Data Reviewed  Labs: ordered. Decision-making details documented in ED Course.  Radiology: ordered. Decision-making details documented in ED Course.  ECG/medicine tests: ordered. Decision-making details documented in ED Course.      Risk  Prescription drug management.          Final diagnoses:   Flank pain       ED Disposition  ED Disposition     ED Disposition   Discharge    Condition   Stable    Comment   --             Eddie Latif, APRN  [address] [id number]  [phone]    Schedule an appointment as soon as possible for a visit   For further evaluation         Medication List      No changes were made to your prescriptions during this visit.         Charly Fisher, APRN  05/24/23 1401

## 2023-05-25 LAB — BACTERIA SPEC AEROBE CULT: NORMAL

## 2023-05-25 RX ORDER — LANCETS 33 GAUGE
1 EACH MISCELLANEOUS 3 TIMES DAILY
Qty: 100 EACH | Refills: 3 | Status: SHIPPED | OUTPATIENT
Start: 2023-05-25

## 2023-05-25 RX ORDER — DULOXETIN HYDROCHLORIDE 30 MG/1
30 CAPSULE, DELAYED RELEASE ORAL 2 TIMES DAILY
Qty: 60 CAPSULE | Refills: 2 | Status: SHIPPED | OUTPATIENT
Start: 2023-05-25

## 2023-05-25 RX ORDER — GABAPENTIN 300 MG/1
300 CAPSULE ORAL NIGHTLY PRN
Qty: 30 CAPSULE | Refills: 0 | Status: SHIPPED | OUTPATIENT
Start: 2023-05-25 | End: 2023-06-24

## 2023-05-25 RX ORDER — DEXTROMETHORPHAN HYDROBROMIDE AND PROMETHAZINE HYDROCHLORIDE 15; 6.25 MG/5ML; MG/5ML
5 SYRUP ORAL 2 TIMES DAILY PRN
Qty: 120 ML | Refills: 0 | Status: SHIPPED | OUTPATIENT
Start: 2023-05-25

## 2023-05-27 VITALS
DIASTOLIC BLOOD PRESSURE: 94 MMHG | BODY MASS INDEX: 45.96 KG/M2 | RESPIRATION RATE: 14 BRPM | WEIGHT: 286 LBS | OXYGEN SATURATION: 100 % | SYSTOLIC BLOOD PRESSURE: 146 MMHG | TEMPERATURE: 98 F | HEART RATE: 96 BPM | HEIGHT: 66 IN

## 2023-05-27 PROBLEM — N20.0 RENAL CALCULUS, LEFT: Status: RESOLVED | Noted: 2021-01-16 | Resolved: 2023-05-27

## 2023-07-19 ENCOUNTER — TELEPHONE (OUTPATIENT)
Dept: ONCOLOGY | Facility: CLINIC | Age: 38
End: 2023-07-19

## 2023-07-19 NOTE — TELEPHONE ENCOUNTER
Caller: Natalia Arzate    Relationship: Self    Best call back number: [phone]    What is the best time to reach you: ASAP    Who are you requesting to speak with (clinical staff, provider,  specific staff member): SCHEDULING      What was the call regarding: PT IS IN [facility],SHE CANCELED TOMORROW'S APPT, NEEDS TO RESCHEDULE.  SHE IS SUPPOSED TO GET OUT OF HOSPITAL TOMORROW, BUT IS NOT SURE WHEN.  PLEASE RESCHEDULE LAB AND FOLLOW UP, OK TO SEND MESSAGE WITH NEW APPT THROUGH [employer].  CAN'T DO JULY 25TH     Is it okay if the provider responds through [employer]: YES             Disordered eating pattern

## 2023-07-20 ENCOUNTER — TELEPHONE (OUTPATIENT)
Dept: FAMILY MEDICINE CLINIC | Facility: CLINIC | Age: 38
End: 2023-07-20

## 2023-07-20 NOTE — TELEPHONE ENCOUNTER
Caller: Natalia Arzate    Relationship: Self    Best call back number: [phone]     What medication are you requesting: COUGH MEDICATION    What are your current symptoms: COUGHING A LOT     How long have you been experiencing symptoms: A COUPLE OF DAYS    Have you had these symptoms before:    [x] Yes  [] No    Have you been treated for these symptoms before:   [x] Yes  [] No    If a prescription is needed, what is your preferred pharmacy and phone number: [address] [phone] [facility] [phone]      Additional notes: PATIENT WOULD LIKE TO KNOW IF SHE CAN GET A MEDICATION CALLED IN FOR HER COUGH WITHOUT BEING SEEN, SHE HAS AN APPOINTMENT ON 7/28

## 2023-07-24 ENCOUNTER — TELEPHONE (OUTPATIENT)
Dept: ONCOLOGY | Facility: CLINIC | Age: 38
End: 2023-07-24

## 2023-07-24 NOTE — TELEPHONE ENCOUNTER
Caller: Natalia Arzate    Relationship to patient: Self    Best call back number: [phone]     Chief complaint: PT IS DUE TO HAVE SURGERY THE DAY AFTER HER APPT AND IS NOT ABLE TO MAKE HER APPT ON 07/31/23.    Type of visit: LAB AND FOLLOW UP    Requested date: ANY TIME BEFORE 07/31/23 OR A WEEK AND A HALF AFTER 07/31/23 SO PT HAS TIME TO RECOVER FROM SURGERY    If rescheduling, when is the original appointment: 07/31/23    Additional notes: PLEASE CALL PT BACK WITH NEW APPT DATE/TIME.     HUB WAS UNABLE TO SCHEDULE DUE TO NOT BEING ABLE TO SCHEDULE LABS

## 2023-07-26 ENCOUNTER — HOSPITAL ENCOUNTER (EMERGENCY)
Facility: HOSPITAL | Age: 38
Discharge: HOME | End: 2023-07-26
Attending: STUDENT IN AN ORGANIZED HEALTH CARE EDUCATION/TRAINING PROGRAM | Admitting: STUDENT IN AN ORGANIZED HEALTH CARE EDUCATION/TRAINING PROGRAM
Payer: COMMERCIAL

## 2023-07-26 VITALS
OXYGEN SATURATION: 94 % | RESPIRATION RATE: 17 BRPM | WEIGHT: 285 LBS | BODY MASS INDEX: 48.65 KG/M2 | HEART RATE: 106 BPM | DIASTOLIC BLOOD PRESSURE: 90 MMHG | TEMPERATURE: 97.5 F | SYSTOLIC BLOOD PRESSURE: 152 MMHG | HEIGHT: 64 IN

## 2023-07-26 DIAGNOSIS — N23 RENAL COLIC ON RIGHT SIDE: Primary | ICD-10-CM

## 2023-07-26 LAB
ALBUMIN SERPL-MCNC: 4.4 G/DL (ref 3.5–5.2)
ALBUMIN/GLOB SERPL: 1.3 G/DL
ALP SERPL-CCNC: 100 U/L (ref 39–117)
ALT SERPL W P-5'-P-CCNC: 21 U/L (ref 1–33)
AMPHET+METHAMPHET UR QL: NEGATIVE
AMPHETAMINES UR QL: NEGATIVE
ANION GAP SERPL CALCULATED.3IONS-SCNC: 16 MMOL/L (ref 5–15)
AST SERPL-CCNC: 31 U/L (ref 1–32)
BACTERIA UR QL AUTO: ABNORMAL /HPF
BARBITURATES UR QL SCN: NEGATIVE
BASOPHILS # BLD AUTO: 0.04 10*3/MM3 (ref 0–0.2)
BASOPHILS NFR BLD AUTO: 0.7 % (ref 0–1.5)
BENZODIAZ UR QL SCN: NEGATIVE
BILIRUB SERPL-MCNC: 0.4 MG/DL (ref 0–1.2)
BILIRUB UR QL STRIP: NEGATIVE
BUN SERPL-MCNC: 17 MG/DL (ref 6–20)
BUN/CREAT SERPL: 23.3 (ref 7–25)
BUPRENORPHINE SERPL-MCNC: NEGATIVE NG/ML
CALCIUM SPEC-SCNC: 10.1 MG/DL (ref 8.6–10.5)
CANNABINOIDS SERPL QL: NEGATIVE
CHLORIDE SERPL-SCNC: 96 MMOL/L (ref 98–107)
CLARITY UR: CLEAR
CO2 SERPL-SCNC: 20 MMOL/L (ref 22–29)
COCAINE UR QL: NEGATIVE
COLOR UR: YELLOW
CREAT SERPL-MCNC: 0.73 MG/DL (ref 0.57–1)
CRP SERPL-MCNC: 1.74 MG/DL (ref 0–0.5)
DEPRECATED RDW RBC AUTO: 45.8 FL (ref 37–54)
EGFRCR SERPLBLD CKD-EPI 2021: 108.8 ML/MIN/1.73
EOSINOPHIL # BLD AUTO: 0.12 10*3/MM3 (ref 0–0.4)
EOSINOPHIL NFR BLD AUTO: 2.2 % (ref 0.3–6.2)
ERYTHROCYTE [DISTWIDTH] IN BLOOD BY AUTOMATED COUNT: 13.6 % (ref 12.3–15.4)
FENTANYL UR-MCNC: NEGATIVE NG/ML
FLUAV RNA RESP QL NAA+PROBE: NOT DETECTED
FLUBV RNA ISLT QL NAA+PROBE: NOT DETECTED
GLOBULIN UR ELPH-MCNC: 3.5 GM/DL
GLUCOSE SERPL-MCNC: 312 MG/DL (ref 65–99)
GLUCOSE UR STRIP-MCNC: ABNORMAL MG/DL
HCT VFR BLD AUTO: 39.8 % (ref 34–46.6)
HGB BLD-MCNC: 12.8 G/DL (ref 12–15.9)
HGB UR QL STRIP.AUTO: ABNORMAL
HYALINE CASTS UR QL AUTO: ABNORMAL /LPF
IMM GRANULOCYTES # BLD AUTO: 0.03 10*3/MM3 (ref 0–0.05)
IMM GRANULOCYTES NFR BLD AUTO: 0.6 % (ref 0–0.5)
KETONES UR QL STRIP: ABNORMAL
LEUKOCYTE ESTERASE UR QL STRIP.AUTO: ABNORMAL
LIPASE SERPL-CCNC: 41 U/L (ref 13–60)
LYMPHOCYTES # BLD AUTO: 1.58 10*3/MM3 (ref 0.7–3.1)
LYMPHOCYTES NFR BLD AUTO: 29.6 % (ref 19.6–45.3)
MCH RBC QN AUTO: 30.2 PG (ref 26.6–33)
MCHC RBC AUTO-ENTMCNC: 32.2 G/DL (ref 31.5–35.7)
MCV RBC AUTO: 93.9 FL (ref 79–97)
METHADONE UR QL SCN: NEGATIVE
MONOCYTES # BLD AUTO: 0.49 10*3/MM3 (ref 0.1–0.9)
MONOCYTES NFR BLD AUTO: 9.2 % (ref 5–12)
NEUTROPHILS NFR BLD AUTO: 3.08 10*3/MM3 (ref 1.7–7)
NEUTROPHILS NFR BLD AUTO: 57.7 % (ref 42.7–76)
NITRITE UR QL STRIP: NEGATIVE
NRBC BLD AUTO-RTO: 0 /100 WBC (ref 0–0.2)
OPIATES UR QL: POSITIVE
OXYCODONE UR QL SCN: NEGATIVE
PCP UR QL SCN: NEGATIVE
PH UR STRIP.AUTO: 5.5 [PH] (ref 5–8)
PLATELET # BLD AUTO: 236 10*3/MM3 (ref 140–450)
PMV BLD AUTO: 9.2 FL (ref 6–12)
POTASSIUM SERPL-SCNC: 4.9 MMOL/L (ref 3.5–5.2)
PROPOXYPH UR QL: NEGATIVE
PROT SERPL-MCNC: 7.9 G/DL (ref 6–8.5)
PROT UR QL STRIP: ABNORMAL
RBC # BLD AUTO: 4.24 10*6/MM3 (ref 3.77–5.28)
RBC # UR STRIP: ABNORMAL /HPF
REF LAB TEST METHOD: ABNORMAL
SARS-COV-2 RNA RESP QL NAA+PROBE: NOT DETECTED
SODIUM SERPL-SCNC: 132 MMOL/L (ref 136–145)
SP GR UR STRIP: 1.02 (ref 1–1.03)
SQUAMOUS #/AREA URNS HPF: ABNORMAL /HPF
TRICYCLICS UR QL SCN: NEGATIVE
UROBILINOGEN UR QL STRIP: ABNORMAL
WBC # UR STRIP: ABNORMAL /HPF
WBC NRBC COR # BLD: 5.34 10*3/MM3 (ref 3.4–10.8)

## 2023-07-26 PROCEDURE — 25010000002 HYDROMORPHONE PER 4 MG: Performed by: STUDENT IN AN ORGANIZED HEALTH CARE EDUCATION/TRAINING PROGRAM

## 2023-07-26 PROCEDURE — 25010000002 ONDANSETRON PER 1 MG: Performed by: PHYSICIAN ASSISTANT

## 2023-07-26 PROCEDURE — 99283 EMERGENCY DEPT VISIT LOW MDM: CPT

## 2023-07-26 PROCEDURE — 87086 URINE CULTURE/COLONY COUNT: CPT | Performed by: PHYSICIAN ASSISTANT

## 2023-07-26 PROCEDURE — 25010000002 MORPHINE PER 10 MG: Performed by: STUDENT IN AN ORGANIZED HEALTH CARE EDUCATION/TRAINING PROGRAM

## 2023-07-26 PROCEDURE — 80053 COMPREHEN METABOLIC PANEL: CPT | Performed by: PHYSICIAN ASSISTANT

## 2023-07-26 PROCEDURE — 96374 THER/PROPH/DIAG INJ IV PUSH: CPT

## 2023-07-26 PROCEDURE — 36415 COLL VENOUS BLD VENIPUNCTURE: CPT

## 2023-07-26 PROCEDURE — 80307 DRUG TEST PRSMV CHEM ANLYZR: CPT | Performed by: PHYSICIAN ASSISTANT

## 2023-07-26 PROCEDURE — 83690 ASSAY OF LIPASE: CPT | Performed by: PHYSICIAN ASSISTANT

## 2023-07-26 PROCEDURE — 87636 SARSCOV2 & INF A&B AMP PRB: CPT | Performed by: PHYSICIAN ASSISTANT

## 2023-07-26 PROCEDURE — 96375 TX/PRO/DX INJ NEW DRUG ADDON: CPT

## 2023-07-26 PROCEDURE — 81001 URINALYSIS AUTO W/SCOPE: CPT | Performed by: PHYSICIAN ASSISTANT

## 2023-07-26 PROCEDURE — 86140 C-REACTIVE PROTEIN: CPT | Performed by: PHYSICIAN ASSISTANT

## 2023-07-26 PROCEDURE — 51798 US URINE CAPACITY MEASURE: CPT

## 2023-07-26 PROCEDURE — 85025 COMPLETE CBC W/AUTO DIFF WBC: CPT | Performed by: PHYSICIAN ASSISTANT

## 2023-07-26 RX ORDER — ONDANSETRON 2 MG/ML
4 INJECTION INTRAMUSCULAR; INTRAVENOUS ONCE
Status: COMPLETED | OUTPATIENT
Start: 2023-07-26 | End: 2023-07-26

## 2023-07-26 RX ORDER — SODIUM CHLORIDE 0.9 % (FLUSH) 0.9 %
10 SYRINGE (ML) INJECTION AS NEEDED
Status: DISCONTINUED | OUTPATIENT
Start: 2023-07-26 | End: 2023-07-26 | Stop reason: HOSPADM

## 2023-07-26 RX ORDER — HYDROMORPHONE HYDROCHLORIDE 1 MG/ML
0.5 INJECTION, SOLUTION INTRAMUSCULAR; INTRAVENOUS; SUBCUTANEOUS ONCE
Status: COMPLETED | OUTPATIENT
Start: 2023-07-26 | End: 2023-07-26

## 2023-07-26 RX ADMIN — SODIUM CHLORIDE 1000 ML: 9 INJECTION, SOLUTION INTRAVENOUS at 11:30

## 2023-07-26 RX ADMIN — HYDROMORPHONE HYDROCHLORIDE 0.5 MG: 1 INJECTION, SOLUTION INTRAMUSCULAR; INTRAVENOUS; SUBCUTANEOUS at 14:07

## 2023-07-26 RX ADMIN — ONDANSETRON 4 MG: 2 INJECTION INTRAMUSCULAR; INTRAVENOUS at 11:37

## 2023-07-26 RX ADMIN — MORPHINE SULFATE 4 MG: 4 INJECTION, SOLUTION INTRAMUSCULAR; INTRAVENOUS at 11:32

## 2023-07-26 NOTE — ED PROVIDER NOTES
Subjective   History of Present Illness  This is a 38 year old female patient who presents to the ER with chief complaint of right sided flank pain. PMH significant for kidney stones, HLD, GERD, DM, history of DVT/PE. The patient was recently diagnosed with a 10 mm ureteral stone on the right side. She is being followed by  and is scheduled to have the stone extracted next week at . Today, she woke up and she hadn't produced urine since 11 pm last night. In addition, the pain in her right flank is worsening significantly as is her nausea and vomiting.     Review of Systems   Constitutional: Negative.  Negative for fever.   HENT: Negative.     Respiratory: Negative.     Cardiovascular: Negative.  Negative for chest pain.   Gastrointestinal:  Positive for abdominal pain, nausea and vomiting. Negative for abdominal distention, anal bleeding, blood in stool, constipation, diarrhea and rectal pain.   Endocrine: Negative.    Genitourinary:  Positive for dysuria, flank pain and hematuria. Negative for decreased urine volume, difficulty urinating, dyspareunia, enuresis, frequency, genital sores, menstrual problem, pelvic pain, urgency, vaginal bleeding, vaginal discharge and vaginal pain.   Skin: Negative.    Neurological: Negative.    Psychiatric/Behavioral: Negative.     All other systems reviewed and are negative.    Past Medical History:   Diagnosis Date    A-fib     Abnormal ECG     Anemia     Anxiety     Asthma     Cancer     Ovarian    Depression     Diabetes mellitus     DVT (deep venous thrombosis)     Factor 5 Leiden mutation, heterozygous     Fibroid     GERD (gastroesophageal reflux disease)     Gout     H/O abdominal abscess     History of sepsis     History of transfusion     Hyperlipidemia     Hypothyroid     Kidney stone     Migraines     Neuropathy     Ovarian cancer 02/2021    Ovarian cyst     PE (pulmonary embolism)     Polycystic ovary syndrome     Preeclampsia     Rh incompatibility     Stroke      TIA (transient ischemic attack)     Urinary tract infection     Varicella        Allergies   Allergen Reactions    Toradol [Ketorolac Tromethamine] Anaphylaxis and Hives    Haldol [Haloperidol] Hives and Mental Status Change    Tramadol Hives and Swelling    Amoxicillin Hives and Rash    Penicillins Hives and Rash       Past Surgical History:   Procedure Laterality Date    ABDOMINAL SURGERY      CARDIAC CATHETERIZATION       SECTION      CHOLECYSTECTOMY      COLONOSCOPY      ENDOSCOPY      EXTRACORPOREAL SHOCK WAVE LITHOTRIPSY (ESWL) Left 10/22/2021    Procedure: EXTRACORPOREAL SHOCKWAVE LITHOTRIPSY;  Surgeon: Milan Motley MD;  Location: [facility];  Service: Urology;  Laterality: Left;    LITHOTRIPSY      RIGHT OOPHORECTOMY      URETEROSCOPY LASER LITHOTRIPSY WITH STENT INSERTION Left 10/01/2021    Procedure: URETEROSCOPY WITH STENT PLACEMENT;  Surgeon: Milan Motley MD;  Location: [facility];  Service: Urology;  Laterality: Left;    URETEROSCOPY LASER LITHOTRIPSY WITH STENT INSERTION Left 2022    Procedure: URETEROSCOPY STENT REMOVAL;  Surgeon: Milan Motley MD;  Location: [facility];  Service: Urology;  Laterality: Left;    URETEROSCOPY LASER LITHOTRIPSY WITH STENT INSERTION Left 2022    Procedure: CYSTOSCOPY RETROGRADE LEFT WITH STENT PLACEMENT;  Surgeon: Milan Motley MD;  Location: [facility];  Service: Urology;  Laterality: Left;       Family History   Problem Relation Age of Onset    Diabetes Father     Hypertension Father     Heart attack Father     Hypertension Mother     No Known Problems Paternal Grandmother     No Known Problems Paternal Grandfather     No Known Problems Maternal Grandmother     No Known Problems Maternal Grandfather     No Known Problems Sister     No Known Problems Brother     No Known Problems Daughter     No Known Problems Son     No Known Problems Cousin     Rheum arthritis Neg Hx     Osteoarthritis Neg Hx     Asthma Neg Hx      "Heart failure Neg Hx     Hyperlipidemia Neg Hx     Migraines Neg Hx     Rashes / Skin problems Neg Hx     Seizures Neg Hx     Stroke Neg Hx     Thyroid disease Neg Hx        Social History     Socioeconomic History    Marital status:    Tobacco Use    Smoking status: Never     Passive exposure: Never    Smokeless tobacco: Never   Vaping Use    Vaping Use: Never used   Substance and Sexual Activity    Alcohol use: No    Drug use: Never     Comment: Pt has track marks on right arm. Pt states \"It's from my INR being drawn.\"     Sexual activity: Not Currently     Partners: Male     Birth control/protection: None           Objective   Physical Exam  Vitals and nursing note reviewed.   Constitutional:       General: She is not in acute distress.     Appearance: She is well-developed. She is not diaphoretic.   HENT:      Head: Normocephalic and atraumatic.      Right Ear: External ear normal.      Left Ear: External ear normal.      Nose: Nose normal.   Eyes:      Conjunctiva/sclera: Conjunctivae normal.      Pupils: Pupils are equal, round, and reactive to light.   Neck:      Vascular: No JVD.      Trachea: No tracheal deviation.   Cardiovascular:      Rate and Rhythm: Normal rate and regular rhythm.      Heart sounds: Normal heart sounds. No murmur heard.  Pulmonary:      Effort: Pulmonary effort is normal. No respiratory distress.      Breath sounds: Normal breath sounds. No wheezing.   Abdominal:      General: Bowel sounds are normal.      Palpations: Abdomen is soft.      Tenderness: There is no abdominal tenderness. There is no right CVA tenderness, left CVA tenderness, guarding or rebound.   Musculoskeletal:         General: No deformity. Normal range of motion.      Cervical back: Normal range of motion and neck supple.   Skin:     General: Skin is warm and dry.      Coloration: Skin is not pale.      Findings: No erythema or rash.   Neurological:      Mental Status: She is alert and oriented to person, " place, and time.      Cranial Nerves: No cranial nerve deficit.   Psychiatric:         Behavior: Behavior normal.         Thought Content: Thought content normal.       Procedures       Results for orders placed or performed during the hospital encounter of 07/26/23   COVID-19 and FLU A/B PCR - Swab, Nasopharynx    Specimen: Nasopharynx; Swab   Result Value Ref Range    COVID19 Not Detected Not Detected - Ref. Range    Influenza A PCR Not Detected Not Detected    Influenza B PCR Not Detected Not Detected   Comprehensive Metabolic Panel    Specimen: Arm, Right; Blood   Result Value Ref Range    Glucose 312 (H) 65 - 99 mg/dL    BUN 17 6 - 20 mg/dL    Creatinine 0.73 0.57 - 1.00 mg/dL    Sodium 132 (L) 136 - 145 mmol/L    Potassium 4.9 3.5 - 5.2 mmol/L    Chloride 96 (L) 98 - 107 mmol/L    CO2 20.0 (L) 22.0 - 29.0 mmol/L    Calcium 10.1 8.6 - 10.5 mg/dL    Total Protein 7.9 6.0 - 8.5 g/dL    Albumin 4.4 3.5 - 5.2 g/dL    ALT (SGPT) 21 1 - 33 U/L    AST (SGOT) 31 1 - 32 U/L    Alkaline Phosphatase 100 39 - 117 U/L    Total Bilirubin 0.4 0.0 - 1.2 mg/dL    Globulin 3.5 gm/dL    A/G Ratio 1.3 g/dL    BUN/Creatinine Ratio 23.3 7.0 - 25.0    Anion Gap 16.0 (H) 5.0 - 15.0 mmol/L    eGFR 108.8 >60.0 mL/min/1.73   Lipase    Specimen: Arm, Right; Blood   Result Value Ref Range    Lipase 41 13 - 60 U/L   Urine Drug Screen - Urine, Clean Catch    Specimen: Urine, Clean Catch   Result Value Ref Range    THC, Screen, Urine Negative Negative    Phencyclidine (PCP), Urine Negative Negative    Cocaine Screen, Urine Negative Negative    Methamphetamine, Ur Negative Negative    Opiate Screen Positive (A) Negative    Amphetamine Screen, Urine Negative Negative    Benzodiazepine Screen, Urine Negative Negative    Tricyclic Antidepressants Screen Negative Negative    Methadone Screen, Urine Negative Negative    Barbiturates Screen, Urine Negative Negative    Oxycodone Screen, Urine Negative Negative    Propoxyphene Screen Negative Negative     Buprenorphine, Screen, Urine Negative Negative   C-reactive Protein    Specimen: Arm, Right; Blood   Result Value Ref Range    C-Reactive Protein 1.74 (H) 0.00 - 0.50 mg/dL   Urinalysis With Culture If Indicated - Urine, Clean Catch    Specimen: Urine, Clean Catch   Result Value Ref Range    Color, UA Yellow Yellow, Straw    Appearance, UA Clear Clear    pH, UA 5.5 5.0 - 8.0    Specific Gravity, UA 1.020 1.005 - 1.030    Glucose,  mg/dL (1+) (A) Negative    Ketones, UA Trace (A) Negative    Bilirubin, UA Negative Negative    Blood, UA Small (1+) (A) Negative    Protein,  mg/dL (2+) (A) Negative    Leuk Esterase, UA Trace (A) Negative    Nitrite, UA Negative Negative    Urobilinogen, UA 0.2 E.U./dL 0.2 - 1.0 E.U./dL   CBC Auto Differential    Specimen: Arm, Right; Blood   Result Value Ref Range    WBC 5.34 3.40 - 10.80 10*3/mm3    RBC 4.24 3.77 - 5.28 10*6/mm3    Hemoglobin 12.8 12.0 - 15.9 g/dL    Hematocrit 39.8 34.0 - 46.6 %    MCV 93.9 79.0 - 97.0 fL    MCH 30.2 26.6 - 33.0 pg    MCHC 32.2 31.5 - 35.7 g/dL    RDW 13.6 12.3 - 15.4 %    RDW-SD 45.8 37.0 - 54.0 fl    MPV 9.2 6.0 - 12.0 fL    Platelets 236 140 - 450 10*3/mm3    Neutrophil % 57.7 42.7 - 76.0 %    Lymphocyte % 29.6 19.6 - 45.3 %    Monocyte % 9.2 5.0 - 12.0 %    Eosinophil % 2.2 0.3 - 6.2 %    Basophil % 0.7 0.0 - 1.5 %    Immature Grans % 0.6 (H) 0.0 - 0.5 %    Neutrophils, Absolute 3.08 1.70 - 7.00 10*3/mm3    Lymphocytes, Absolute 1.58 0.70 - 3.10 10*3/mm3    Monocytes, Absolute 0.49 0.10 - 0.90 10*3/mm3    Eosinophils, Absolute 0.12 0.00 - 0.40 10*3/mm3    Basophils, Absolute 0.04 0.00 - 0.20 10*3/mm3    Immature Grans, Absolute 0.03 0.00 - 0.05 10*3/mm3    nRBC 0.0 0.0 - 0.2 /100 WBC   Fentanyl, Urine - Urine, Clean Catch    Specimen: Urine, Clean Catch   Result Value Ref Range    Fentanyl, Urine Negative Negative          ED Course  ED Course as of 07/26/23 1428   Wed Jul 26, 2023   1426 Patient diagnosed with renal colic. Please  follow up with  urology or return to ER if symptoms worsen.  [MM]      ED Course User Index  [MM] Nuvia Cee PA                                           Medical Decision Making    This is a 38 year old female patient who presents to the ER with chief complaint of right sided flank pain. PMH significant for kidney stones, HLD, GERD, DM, history of DVT/PE. The patient was recently diagnosed with a 10 mm ureteral stone on the right side. She is being followed by  and is scheduled to have the stone extracted next week at . Today, she woke up and she hadn't produced urine since 11 pm last night. In addition, the pain in her right flank is worsening significantly as is her nausea and vomiting.     Problems Addressed:  Renal colic on right side: complicated acute illness or injury    Amount and/or Complexity of Data Reviewed  Labs: ordered. Decision-making details documented in ED Course.    Risk  Prescription drug management.        Final diagnoses:   Renal colic on right side       ED Disposition  ED Disposition       ED Disposition   Discharge    Condition   Stable    Comment   --                Urology  [place], [place]  Call today           Medication List      No changes were made to your prescriptions during this visit.            Nuvia Cee PA  07/26/23 [id number]

## 2023-07-27 ENCOUNTER — OFFICE VISIT (OUTPATIENT)
Dept: FAMILY MEDICINE CLINIC | Facility: CLINIC | Age: 38
End: 2023-07-27
Payer: COMMERCIAL

## 2023-07-27 VITALS
HEART RATE: 104 BPM | SYSTOLIC BLOOD PRESSURE: 138 MMHG | OXYGEN SATURATION: 95 % | WEIGHT: 293 LBS | TEMPERATURE: 96.6 F | DIASTOLIC BLOOD PRESSURE: 88 MMHG | HEIGHT: 64 IN | BODY MASS INDEX: 50.02 KG/M2 | RESPIRATION RATE: 14 BRPM

## 2023-07-27 DIAGNOSIS — D68.51 FACTOR 5 LEIDEN MUTATION, HETEROZYGOUS: Chronic | ICD-10-CM

## 2023-07-27 DIAGNOSIS — E78.5 DYSLIPIDEMIA: Chronic | ICD-10-CM

## 2023-07-27 DIAGNOSIS — Z79.4 TYPE 2 DIABETES MELLITUS WITH DIABETIC NEUROPATHY, WITH LONG-TERM CURRENT USE OF INSULIN: Primary | Chronic | ICD-10-CM

## 2023-07-27 DIAGNOSIS — M1A.9XX0 CHRONIC GOUT WITHOUT TOPHUS, UNSPECIFIED CAUSE, UNSPECIFIED SITE: Chronic | ICD-10-CM

## 2023-07-27 DIAGNOSIS — J30.9 CHRONIC ALLERGIC RHINITIS: Chronic | ICD-10-CM

## 2023-07-27 DIAGNOSIS — J45.909 MODERATE ASTHMA, UNSPECIFIED WHETHER COMPLICATED, UNSPECIFIED WHETHER PERSISTENT: Chronic | ICD-10-CM

## 2023-07-27 DIAGNOSIS — R05.9 COUGH, UNSPECIFIED TYPE: Chronic | ICD-10-CM

## 2023-07-27 DIAGNOSIS — E11.40 TYPE 2 DIABETES MELLITUS WITH DIABETIC NEUROPATHY, WITH LONG-TERM CURRENT USE OF INSULIN: Primary | Chronic | ICD-10-CM

## 2023-07-27 DIAGNOSIS — E55.9 VITAMIN D DEFICIENCY: ICD-10-CM

## 2023-07-27 DIAGNOSIS — I10 PRIMARY HYPERTENSION: Chronic | ICD-10-CM

## 2023-07-27 DIAGNOSIS — K76.0 HEPATIC STEATOSIS: Chronic | ICD-10-CM

## 2023-07-27 DIAGNOSIS — N20.0 NEPHROLITHIASIS: ICD-10-CM

## 2023-07-27 LAB — BACTERIA SPEC AEROBE CULT: NORMAL

## 2023-07-27 PROCEDURE — 99214 OFFICE O/P EST MOD 30 MIN: CPT | Performed by: NURSE PRACTITIONER

## 2023-07-27 PROCEDURE — 3075F SYST BP GE 130 - 139MM HG: CPT | Performed by: NURSE PRACTITIONER

## 2023-07-27 PROCEDURE — 3051F HG A1C>EQUAL 7.0%<8.0%: CPT | Performed by: NURSE PRACTITIONER

## 2023-07-27 PROCEDURE — 3079F DIAST BP 80-89 MM HG: CPT | Performed by: NURSE PRACTITIONER

## 2023-07-27 RX ORDER — ERGOCALCIFEROL 1.25 MG/1
50000 CAPSULE ORAL WEEKLY
Qty: 5 CAPSULE | Refills: 3 | Status: SHIPPED | OUTPATIENT
Start: 2023-07-27

## 2023-07-27 RX ORDER — ALLOPURINOL 100 MG/1
100 TABLET ORAL DAILY
Qty: 30 TABLET | Refills: 3 | Status: SHIPPED | OUTPATIENT
Start: 2023-07-27

## 2023-07-27 RX ORDER — AMLODIPINE BESYLATE 10 MG/1
10 TABLET ORAL DAILY
Qty: 30 TABLET | Refills: 3 | Status: SHIPPED | OUTPATIENT
Start: 2023-07-27 | End: 2024-07-26

## 2023-07-27 RX ORDER — GABAPENTIN 300 MG/1
300 CAPSULE ORAL NIGHTLY PRN
Qty: 30 CAPSULE | Refills: 0 | Status: SHIPPED | OUTPATIENT
Start: 2023-07-27 | End: 2023-08-26

## 2023-07-27 RX ORDER — ALBUTEROL SULFATE 90 UG/1
2 AEROSOL, METERED RESPIRATORY (INHALATION) EVERY 4 HOURS PRN
Qty: 18 G | Refills: 3 | Status: SHIPPED | OUTPATIENT
Start: 2023-07-27

## 2023-07-27 RX ORDER — BENZONATATE 200 MG/1
200 CAPSULE ORAL 3 TIMES DAILY PRN
Qty: 20 CAPSULE | Refills: 1 | Status: SHIPPED | OUTPATIENT
Start: 2023-07-27

## 2023-07-27 RX ORDER — NEBULIZER ACCESSORIES
1 KIT MISCELLANEOUS TAKE AS DIRECTED
Qty: 1 EACH | Refills: 1 | Status: SHIPPED | OUTPATIENT
Start: 2023-07-27

## 2023-07-27 RX ORDER — ROSUVASTATIN CALCIUM 20 MG/1
20 TABLET, COATED ORAL NIGHTLY
Qty: 30 TABLET | Refills: 5 | Status: SHIPPED | OUTPATIENT
Start: 2023-07-27

## 2023-07-27 RX ORDER — MONTELUKAST SODIUM 10 MG/1
10 TABLET ORAL NIGHTLY
Qty: 30 TABLET | Refills: 3 | Status: SHIPPED | OUTPATIENT
Start: 2023-07-27

## 2023-07-27 RX ORDER — INSULIN LISPRO 100 [IU]/ML
INJECTION, SOLUTION INTRAVENOUS; SUBCUTANEOUS
Qty: 15 ML | Refills: 0 | Status: SHIPPED | OUTPATIENT
Start: 2023-07-27

## 2023-07-27 RX ORDER — AZELASTINE 1 MG/ML
SPRAY, METERED NASAL
Qty: 1 EACH | Refills: 3 | Status: SHIPPED | OUTPATIENT
Start: 2023-07-27

## 2023-07-27 RX ORDER — IPRATROPIUM BROMIDE AND ALBUTEROL SULFATE 2.5; .5 MG/3ML; MG/3ML
3 SOLUTION RESPIRATORY (INHALATION) EVERY 4 HOURS PRN
Qty: 150 ML | Refills: 2 | Status: SHIPPED | OUTPATIENT
Start: 2023-07-27

## 2023-07-27 RX ORDER — METOPROLOL SUCCINATE 50 MG/1
50 TABLET, EXTENDED RELEASE ORAL DAILY
Qty: 30 TABLET | Refills: 0 | Status: SHIPPED | OUTPATIENT
Start: 2023-07-27

## 2023-07-27 NOTE — PROGRESS NOTES
History of Present Illness  Natalia Arzate is a 38 y.o. female who presents to the clinic for follow-up.  Natalia had a recent hospitalization from July 17, 2023 until discharge on July 20, 2023 at [facility] for pyuria and funguria and nonobstructive nephrolithiasis.  She is scheduled for outpatient procedure to remove the kidney stones on Tuesday, August 1.  Imaging during her recent hospitalization noted hepatosplenomegaly with liver measuring 32 cm in length and spleen measuring 18 cm in length.  Also showed likely diffuse hepatic steatosis.  She was discharged on doxycycline, Levaquin, Diflucan and nystatin powder.  Natalia has been diagnosed with DM, type 2 with neuropathy currently treated with insulin, HTN, Dyslipidemia, Hypothyroidism, Renal Stones, Gout, and Factor V Leiden.  During her lengthy hospitalization in 20 2122 she suffered a CVA and also had to be placed on mechanical ventilation.    Diabetes  She has type 2 diabetes mellitus treated with insulin both basal and fast acting.OmniPod has been prescribed which was denied by her insurance.  She is using a DexCom which is helping her.  Risk factors for coronary artery disease include diabetes mellitus, dyslipidemia, hypertension, obesity, stress and sedentary lifestyle.     Lab Results   Component Value Date    HGBA1C 9 12/01/2022     Lab Results   Component Value Date    HGBA1C 8.9 12/01/2022     Lab Results   Component Value Date    HGBA1C 7.00 (H) 03/28/2023     Lab Results   Component Value Date    HGBA1C 7.3 (H) 06/30/2023      Hypertension  Currently taking Amlodipine and Metoprolol.      Lab Results   Component Value Date    GLUCOSE 312 (H) 07/26/2023    BUN 17 07/26/2023    CREATININE 0.73 07/26/2023    EGFR 108.8 07/26/2023    BCR 23.3 07/26/2023    K 4.9 07/26/2023    CO2 20.0 (L) 07/26/2023    CALCIUM 10.1 07/26/2023    ALBUMIN 4.4 07/26/2023    BILITOT 0.4 07/26/2023    AST 31 07/26/2023    ALT 21 07/26/2023       Dyslipidemia  Previously prescribed Omega which she has not been taking. Rosuvastatin has been prescribed but not started.  Reports her pharmacy tells her that they have not received a prescription.  Lab Results   Component Value Date    CHOL 260 (H) 03/28/2023    CHOL 142 01/12/2023    CHOL 157 12/01/2022     Lab Results   Component Value Date    TRIG 522 (H) 03/28/2023    TRIG 219 (H) 01/12/2023    TRIG 243 (H) 12/01/2022     Lab Results   Component Value Date    HDL 40 03/28/2023    HDL 18 (L) 01/12/2023    HDL 22 (L) 12/01/2022     Lab Results   Component Value Date     (H) 03/28/2023    LDL 87 01/12/2023    LDL 93 12/01/2022      Hypothyroidism  Had previously been prescribed Levothyroxine but her last TSH was in range without medication  Lab Results   Component Value Date    TSH 4.230 (H) 03/28/2023      Kidney Disease  Previous Hydronephrosis and obstructing stone as well as nonobstructing renal stones.  She has had multiple procedures per urologist, Dr. Motley and  medical team.  Left nephrectomy per  medical urology team in October 2022. Pending right renal procedure for nephrolithiasis    The following portions of the patient's history were reviewed and updated as appropriate: allergies, current medications, past family history, past medical history, past social history, past surgical history and problem list.    Review of Systems   Constitutional:  Negative for activity change, appetite change, chills, fatigue, fever and unexpected weight change.   HENT:  Negative for congestion, ear discharge, ear pain, postnasal drip, rhinorrhea, sinus pressure, sinus pain and voice change.    Eyes:  Negative for pain, discharge, redness, itching and visual disturbance.   Respiratory:  Positive for cough (Intermittent), shortness of breath and wheezing.    Cardiovascular:  Positive for leg swelling (Intermittent). Negative for chest pain and palpitations.   Gastrointestinal:  Negative for nausea and  "vomiting.   Endocrine: Negative for cold intolerance, heat intolerance, polydipsia, polyphagia and polyuria.   Genitourinary:  Positive for flank pain (Evaluated by ED and Urology).   Musculoskeletal:  Positive for arthralgias and gait problem.   Skin:  Negative for color change and rash.   Allergic/Immunologic: Positive for environmental allergies.   Neurological:  Positive for numbness (Left foot numbness secondary to CVA) and headaches. Negative for dizziness, tremors, speech difficulty, weakness and light-headedness.   Hematological:  Negative for adenopathy.   Psychiatric/Behavioral:  Negative for confusion and decreased concentration. The patient is nervous/anxious.    All other systems reviewed and are negative.    Vital signs:  /88   Pulse 104   Temp 96.6 °F (35.9 °C) (Temporal)   Resp 14   Ht 162.6 cm (64.02\")   Wt 133 kg (293 lb)   LMP  (LMP Unknown) Comment: last menses 6 years ago (HCG negative today)  SpO2 95%   BMI 50.27 kg/m²     Physical Exam  Vitals and nursing note reviewed.   Constitutional:       General: She is not in acute distress.     Appearance: She is well-developed.      Comments: Ambulating with walker today.  Her daughter is with her.   HENT:      Head: Normocephalic.      Nose:      Right Sinus: No maxillary sinus tenderness or frontal sinus tenderness.      Left Sinus: No maxillary sinus tenderness or frontal sinus tenderness.      Mouth/Throat:      Mouth: Mucous membranes are moist.   Eyes:      General: No scleral icterus.        Right eye: No discharge.         Left eye: No discharge.      Conjunctiva/sclera: Conjunctivae normal.   Cardiovascular:      Rate and Rhythm: Normal rate and regular rhythm.      Heart sounds: Normal heart sounds. No murmur heard.    No friction rub.   Pulmonary:      Effort: Pulmonary effort is normal. No respiratory distress.      Breath sounds: No wheezing or rales.   Abdominal:      General: There is no distension.      Palpations: " Abdomen is soft.      Tenderness: There is no abdominal tenderness.   Musculoskeletal:         General: Tenderness present.      Cervical back: Neck supple.      Right lower leg: No edema.      Left lower leg: No edema.   Lymphadenopathy:      Cervical: No cervical adenopathy.   Skin:     General: Skin is warm and dry.      Capillary Refill: Capillary refill takes less than 2 seconds.      Findings: No erythema or rash.   Neurological:      Mental Status: She is alert and oriented to person, place, and time.   Psychiatric:         Mood and Affect: Mood and affect normal.         Speech: Speech normal.         Behavior: Behavior normal.         Thought Content: Thought content normal.         Cognition and Memory: Cognition and memory normal.     Result Review : [facility] recent admission.  ED visits     Assessment & Plan     Diagnoses and all orders for this visit:    1. Type 2 diabetes mellitus with diabetic neuropathy, with long-term current use of insulin (Primary)  Comments:  Continue Lantus and sliding scale which is scanned into her chart.  Orders:  -     Insulin Lispro, 1 Unit Dial, (HUMALOG) 100 UNIT/ML solution pen-injector; Per sliding scale  Dispense: 15 mL; Refill: 0  -     gabapentin (NEURONTIN) 300 MG capsule; Take 1 capsule by mouth At Night As Needed (neuropathy) for up to 30 days.  Dispense: 30 capsule; Refill: 0    2. Primary hypertension  Comments:  Continue Amlodipine and Metoprolol.  Will consider ARB/ACEI   Orders:  -     amLODIPine (NORVASC) 10 MG tablet; Take 1 tablet by mouth Daily.  Dispense: 30 tablet; Refill: 3  -     metoprolol succinate XL (TOPROL-XL) 50 MG 24 hr tablet; Take 1 tablet by mouth Daily.  Dispense: 30 tablet; Refill: 0    3. Dyslipidemia  Comments:  Sent rosuvastatin again  Orders:  -     rosuvastatin (CRESTOR) 20 MG tablet; Take 1 tablet by mouth Every Night.  Dispense: 30 tablet; Refill: 5    4. Moderate asthma, unspecified whether complicated, unspecified whether  persistent  Comments:  Continue Dulera and Alb Inhaler prn, Singulair. Avoidance of triggers  Orders:  -     albuterol sulfate  (90 Base) MCG/ACT inhaler; Inhale 2 puffs Every 4 (Four) Hours As Needed for Wheezing.  Dispense: 18 g; Refill: 3  -     montelukast (SINGULAIR) 10 MG tablet; Take 1 tablet by mouth Every Night.  Dispense: 30 tablet; Refill: 3  -     ipratropium-albuterol (DUO-NEB) 0.5-2.5 mg/3 ml nebulizer; Take 3 mL by nebulization Every 4 (Four) Hours As Needed for Shortness of Air.  Dispense: 150 mL; Refill: 2  -     Respiratory Therapy Supplies (Nebulizer/Tubing/Mouthpiece) kit; 1 each Take As Directed.  Dispense: 1 each; Refill: 1    5. Chronic gout without tophus, unspecified cause, unspecified site  Comments:  Continue allopurinol   Orders:  -     allopurinol (ZYLOPRIM) 100 MG tablet; Take 1 tablet by mouth Daily.  Dispense: 30 tablet; Refill: 3    6. Factor 5 Leiden mutation, heterozygous  Comments:  Continue Eliquis  Orders:  -     apixaban (ELIQUIS) 5 MG tablet tablet; Take 1 tablet by mouth 2 (Two) Times a Day.  Dispense: 60 tablet; Refill: 3    7. Chronic allergic rhinitis  Comments:  Astelin NS prescribed  Orders:  -     azelastine (ASTELIN) 0.1 % nasal spray; 2 sprays both nostrils twice daily as needed  Dispense: 1 each; Refill: 3    8. Cough, unspecified type  Comments:  Tessalon Perles 200 mg.  Will consider pulmonology evaluation  Orders:  -     benzonatate (TESSALON) 200 MG capsule; Take 1 capsule by mouth 3 (Three) Times a Day As Needed for Cough.  Dispense: 20 capsule; Refill: 1    9. Hepatic steatosis  Comments:  Discussed options.  Referral to gastroenterology evaluation and treatment  Orders:  -     Ambulatory Referral to Gastroenterology    10. Vitamin D deficiency  Comments:  Continue vitamin D  Orders:  -     vitamin D (ERGOCALCIFEROL) 1.25 MG (54859 UT) capsule capsule; Take 1 capsule by mouth 1 (One) Time Per Week. Prior to [facility] Admission, Patient was on:  takes on  saturday  Dispense: 5 capsule; Refill: 3    11. Nephrolithiasis  Comments:  Follow-up with urologist      Follow Up in August  Findings and recommendations discussed with Natalia.  Reviewed [facility] medical records. Lifestyle modifications reinforced including nutrition and activity recommendations. She will follow up in August sooner if problems/concerns occur.   She was given instructions and counseling regarding her condition or for health maintenance advice. Please see specific information pulled into the AVS if appropriate    As part of the patient's treatment plan they are being prescribed a controlled substance/ substances with potential for abuse.  This patient has been made aware of the appropriate use of such medications, including potential risk of somnolence, limited ability to drive and/or work safely, and potential for overdose.  It has also been made clear these medications are for use by the patient only, without concomitant use of alcohol or other substances unless prescribed/advised by medical provider.    Patient has completed prescribing agreement detailing terms of continued prescribing of controlled substances including monitoring PRANAY reports, urine drug screens, and pill counts.  The patient is aware PRANAY reports are reviewed on a regular basis and scanned into the chart.    History and physical exam exhibit continued safe and appropriate use of controlled substances. Noted pain medications prescribed secondary to her kidney issues.   This document has been electronically signed by:

## 2023-07-29 PROBLEM — R78.81 BACTEREMIA: Status: RESOLVED | Noted: 2022-03-21 | Resolved: 2023-07-29

## 2023-08-10 NOTE — PROGRESS NOTES
Natalia Arzate  [id number]  1985 8/14/2023      Referring Provider:   YEIMI Frausto    Reason for Follow Up:   Factor V Leiden mutation  Deep venous thrombosis (DVT)    Chief Complaint:  Factor V Leiden mutation      History of Present Illness   Natalia Arzate is a very pleasant 38 y.o.  female who presents in follow up appointment for further management and evaluation of Factor V Leiden mutation.     She was previously evaluated by Dr. De Dios in 2013 when she was diagnosed with right lower extremity DVT and bilateral pulmonary embolism. As per his clinic note that was dated on 4/25/14 her workup was positive for heterozygous Factor V Leiden and MTHFR gene mutation. Her thrombosis was felt to be provoked as she was on hormone replacement therapy at the time and Coumadin was recommended for one year. This was later changed to Xarelto by her PCP. She reports being compliant with Xarelto and developing a lower extremity DVT in 2015 at which time she was changed back to Coumadin.     However she has been without Coumadin for one year as she did not follow with her primary provider as scheduled. She presented to [facility] ED after a fall at home with complaints of fevers. She was found to have left pyelonephritis complicated by multiple abscesses as well as superinfection of left iliopsoas hematoma and was transferred to the [facility] on December 26th 2021 where she had a prolonged hospitalizaton. She developed acute respiratory failure and was intubated and also required CRRT/HD. During that admission abscesses were positive for MRSA and E. Coli and she had a left ureteral stent and drain placed which has been removed. She had a repeat CT scan that showed a decrease in size of the left RP collection on 2/7/22. She had an IVC filter placed during her admission and was initially started on heparin drip due to her hematoma and later bivalirudin due to possible heparin resistance which was  held intermittently for procedures or bleeding concerns. This was transitioned to Lovenox and ultimately to Eliquis which she is tolerating well. She also developed an acute/subacute right cerebellar ischemic stroke which was noted on 1/26/22 CT scan compared to CT scan one month prior. Neurology was consulted and recommended continuing anticoagulation. Echo was without intracardiac thrombosis. Since then she has had multiple [facility] ED visits for ongoing flank pain and UTIs and is currently following with Dr. Motley and Dr. Salvador at  with whom she underwent a left nephrectomy.       Interim History:  She presents today for follow up and continues to be on Eliquis is tolerating this well. She continues to experience abdominal pain and hematuria and notes that she recently passed a stone and her stent in her urine. She has follow up with Dr. Lord in November.      The following portions of the patient's history were reviewed and updated as appropriate: allergies, current medications, past family history, past medical history, past social history, past surgical history and problem list.        Allergies   Allergen Reactions    Toradol [Ketorolac Tromethamine] Anaphylaxis and Hives    Haldol [Haloperidol] Hives and Mental Status Change    Tramadol Hives and Swelling    Amoxicillin Hives and Rash    Penicillins Hives and Rash         Past Medical History:   Diagnosis Date    A-fib     Abnormal ECG     Anemia     Anxiety     Asthma     Cancer     Ovarian    Depression     Diabetes mellitus     DVT (deep venous thrombosis)     Factor 5 Leiden mutation, heterozygous     Fibroid     GERD (gastroesophageal reflux disease)     Gout     H/O abdominal abscess     History of sepsis     History of transfusion     Hyperlipidemia     Hypothyroid     Kidney stone     Migraines     Neuropathy     Ovarian cancer 02/2021    Ovarian cyst     PE (pulmonary embolism)     Polycystic ovary syndrome     Preeclampsia     Rh incompatibility   "   Stroke     TIA (transient ischemic attack)     Urinary tract infection     Varicella    ALVARO at [facility] secondary to ovarian torsion        Past Surgical History:   Procedure Laterality Date    ABDOMINAL SURGERY      CARDIAC CATHETERIZATION       SECTION      CHOLECYSTECTOMY      COLONOSCOPY      ENDOSCOPY      EXTRACORPOREAL SHOCK WAVE LITHOTRIPSY (ESWL) Left 10/22/2021    Procedure: EXTRACORPOREAL SHOCKWAVE LITHOTRIPSY;  Surgeon: Milan Motley MD;  Location: [facility];  Service: Urology;  Laterality: Left;    LITHOTRIPSY      RIGHT OOPHORECTOMY      URETEROSCOPY LASER LITHOTRIPSY WITH STENT INSERTION Left 10/01/2021    Procedure: URETEROSCOPY WITH STENT PLACEMENT;  Surgeon: Milan Motley MD;  Location: [facility];  Service: Urology;  Laterality: Left;    URETEROSCOPY LASER LITHOTRIPSY WITH STENT INSERTION Left 2022    Procedure: URETEROSCOPY STENT REMOVAL;  Surgeon: Milan Motley MD;  Location: [facility];  Service: Urology;  Laterality: Left;    URETEROSCOPY LASER LITHOTRIPSY WITH STENT INSERTION Left 2022    Procedure: CYSTOSCOPY RETROGRADE LEFT WITH STENT PLACEMENT;  Surgeon: Milan Motley MD;  Location: [facility];  Service: Urology;  Laterality: Left;         Social History     Socioeconomic History    Marital status:    Tobacco Use    Smoking status: Never     Passive exposure: Never    Smokeless tobacco: Never   Vaping Use    Vaping Use: Never used   Substance and Sexual Activity    Alcohol use: No    Drug use: Never     Comment: Pt has track marks on right arm. Pt states \"It's from my INR being drawn.\"     Sexual activity: Not Currently     Partners: Male     Birth control/protection: None         Family History   Problem Relation Age of Onset    Diabetes Father     Hypertension Father     Heart attack Father     Hypertension Mother     No Known Problems Paternal Grandmother     No Known Problems Paternal Grandfather     No Known Problems Maternal " Grandmother     No Known Problems Maternal Grandfather     No Known Problems Sister     No Known Problems Brother     No Known Problems Daughter     No Known Problems Son     No Known Problems Cousin     Rheum arthritis Neg Hx     Osteoarthritis Neg Hx     Asthma Neg Hx     Heart failure Neg Hx     Hyperlipidemia Neg Hx     Migraines Neg Hx     Rashes / Skin problems Neg Hx     Seizures Neg Hx     Stroke Neg Hx     Thyroid disease Neg Hx    Father - PE          Current Outpatient Medications:     albuterol (PROVENTIL) (2.5 MG/3ML) 0.083% nebulizer solution, Take 2.5 mg by nebulization Every 6 (Six) Hours As Needed for Wheezing or Shortness of Air., Disp: 150 mL, Rfl: 3    albuterol sulfate  (90 Base) MCG/ACT inhaler, Inhale 2 puffs Every 4 (Four) Hours As Needed for Wheezing., Disp: 18 g, Rfl: 3    allopurinol (ZYLOPRIM) 100 MG tablet, Take 1 tablet by mouth Daily., Disp: 30 tablet, Rfl: 3    amLODIPine (NORVASC) 10 MG tablet, Take 1 tablet by mouth Daily., Disp: 30 tablet, Rfl: 3    apixaban (ELIQUIS) 5 MG tablet tablet, Take 1 tablet by mouth 2 (Two) Times a Day., Disp: 60 tablet, Rfl: 3    azelastine (ASTELIN) 0.1 % nasal spray, 2 sprays both nostrils twice daily as needed, Disp: 1 each, Rfl: 3    benzonatate (TESSALON) 200 MG capsule, Take 1 capsule by mouth 3 (Three) Times a Day As Needed for Cough., Disp: 20 capsule, Rfl: 1    Continuous Blood Gluc Sensor (Dexcom G6 Sensor), Every 10 (Ten) Days., Disp: 9 each, Rfl: 3    cyanocobalamin ([employer] Vitamin B-12) 2000 MCG tablet, Take 1 tablet by mouth Daily., Disp: 30 tablet, Rfl: 2    DULoxetine (CYMBALTA) 30 MG capsule, Take 1 capsule by mouth 2 (Two) Times a Day., Disp: 60 capsule, Rfl: 2    gabapentin (NEURONTIN) 300 MG capsule, Take 1 capsule by mouth At Night As Needed (neuropathy) for up to 30 days., Disp: 30 capsule, Rfl: 0    glucose blood test strip, 1 each by Other route 3 (Three) Times a Day., Disp: 100 each, Rfl: 5    Insulin Glargine (Lantus  SoloStar) 100 UNIT/ML injection pen, Maximum daily dose of 75 units., Disp: 24 mL, Rfl: 3    Insulin Lispro, 1 Unit Dial, (HUMALOG) 100 UNIT/ML solution pen-injector, Per sliding scale, Disp: 15 mL, Rfl: 0    Insulin Pen Needle 30G X 8 MM misc, 1 Device 4 (Four) Times a Day., Disp: 200 each, Rfl: 0    ipratropium-albuterol (DUO-NEB) 0.5-2.5 mg/3 ml nebulizer, Take 3 mL by nebulization Every 4 (Four) Hours As Needed for Shortness of Air., Disp: 150 mL, Rfl: 2    Lancets Micro Thin 33G misc, 1 Device 3 (Three) Times a Day., Disp: 100 each, Rfl: 3    metoprolol succinate XL (TOPROL-XL) 50 MG 24 hr tablet, Take 1 tablet by mouth Daily., Disp: 30 tablet, Rfl: 0    mometasone-formoterol (DULERA 200) 200-5 MCG/ACT inhaler, 2 puffs twice daily, Disp: 1 g, Rfl: 2    montelukast (SINGULAIR) 10 MG tablet, Take 1 tablet by mouth Every Night., Disp: 30 tablet, Rfl: 3    nystatin (MYCOSTATIN) 739895 UNIT/GM powder, Apply  topically to the appropriate area as directed 3 (Three) Times a Day., Disp: 60 g, Rfl: 1    polyethylene glycol (MiraLax) 17 g packet, Take 17 g by mouth Daily., Disp: 30 each, Rfl: 3    Respiratory Therapy Supplies (Nebulizer/Tubing/Mouthpiece) kit, 1 each Take As Directed., Disp: 1 each, Rfl: 1    rosuvastatin (CRESTOR) 20 MG tablet, Take 1 tablet by mouth Every Night., Disp: 30 tablet, Rfl: 5    Semaglutide, 2 MG/DOSE, 8 MG/3ML solution pen-injector, Inject 2 mg under the skin into the appropriate area as directed 1 (One) Time Per Week., Disp: 3 mL, Rfl: 3    tamsulosin (FLOMAX) 0.4 MG capsule 24 hr capsule, Take 1 capsule by mouth Daily., Disp: 30 capsule, Rfl: 0    vitamin D (ERGOCALCIFEROL) 1.25 MG (37949 [place]) capsule capsule, Take 1 capsule by mouth 1 (One) Time Per Week. Prior to [facility] Admission, Patient was on:  takes on saturday, Disp: 5 capsule, Rfl: 3        Review of Systems  Pertinent positives are listed as per history of present of illness, all other systems reviewed and are negative. Positive  abdominal pain and hematuria.         Physical Exam   Vital Signs: These were reviewed and listed as per patient's electronic medical chart  Vitals:    08/14/23 1228   BP: 143/95   Pulse: 90   Resp: 18   Temp: 97.3 øF (36.3 øC)   SpO2: 97%     General: Awake, alert and oriented, in no distress, in a wheelchair  HEENT: Head is atraumatic, normocephalic, extraocular movements full, no scleral icterus  Neck: Supple, no JVD, lymphadenopathy or masses  Cardiovascular: Regular rate and rhythm without murmurs, rubs or gallops  Pulmonary: Nonlabored, clear to auscultation bilaterally, no wheezing  Abdomen: Soft, positive tenderness, nondistended, normal active bowel sounds present, no organomegaly  Extremities: No clubbing, cyanosis or edema  Lymph: No cervical, supraclavicular adenopathy  Neurologic: Mental status as above, alert, awake and oriented, grossly nonfocal exam  Skin: Warm, dry, intact        Pain Score:  Pain Score    08/14/23 1228   PainSc:   8   PainLoc: Back         PHQ-Score Total:  PHQ-9 Total Score:          LABS/STUDIES:  Lab on 08/14/2023   Component Date Value    Glucose 08/14/2023 302 (H)     BUN 08/14/2023 15     Creatinine 08/14/2023 0.74     Sodium 08/14/2023 133 (L)     Potassium 08/14/2023 4.0     Chloride 08/14/2023 99     CO2 08/14/2023 20.7 (L)     Calcium 08/14/2023 9.4     Total Protein 08/14/2023 7.4     Albumin 08/14/2023 4.0     ALT (SGPT) 08/14/2023 18     AST (SGOT) 08/14/2023 39 (H)     Alkaline Phosphatase 08/14/2023 106     Total Bilirubin 08/14/2023 0.4     Globulin 08/14/2023 3.4     A/G Ratio 08/14/2023 1.2     BUN/Creatinine Ratio 08/14/2023 20.3     Anion Gap 08/14/2023 13.3     eGFR 08/14/2023 107.0    Admission on 07/26/2023, Discharged on 07/26/2023   Component Date Value    Glucose 07/26/2023 312 (H)     BUN 07/26/2023 17     Creatinine 07/26/2023 0.73     Sodium 07/26/2023 132 (L)     Potassium 07/26/2023 4.9     Chloride 07/26/2023 96 (L)     CO2 07/26/2023 20.0 (L)      Calcium 07/26/2023 10.1     Total Protein 07/26/2023 7.9     Albumin 07/26/2023 4.4     ALT (SGPT) 07/26/2023 21     AST (SGOT) 07/26/2023 31     Alkaline Phosphatase 07/26/2023 100     Total Bilirubin 07/26/2023 0.4     Globulin 07/26/2023 3.5     A/G Ratio 07/26/2023 1.3     BUN/Creatinine Ratio 07/26/2023 23.3     Anion Gap 07/26/2023 16.0 (H)     eGFR 07/26/2023 108.8     COVID19 07/26/2023 Not Detected     Influenza A PCR 07/26/2023 Not Detected     Influenza B PCR 07/26/2023 Not Detected     Lipase 07/26/2023 41     THC, Screen, Urine 07/26/2023 Negative     Phencyclidine (PCP), Uri* 07/26/2023 Negative     Cocaine Screen, Urine 07/26/2023 Negative     Methamphetamine, Ur 07/26/2023 Negative     Opiate Screen 07/26/2023 Positive (A)     Amphetamine Screen, Urine 07/26/2023 Negative     Benzodiazepine Screen, U* 07/26/2023 Negative     Tricyclic Antidepressant* 07/26/2023 Negative     Methadone Screen, Urine 07/26/2023 Negative     Barbiturates Screen, Uri* 07/26/2023 Negative     Oxycodone Screen, Urine 07/26/2023 Negative     Propoxyphene Screen 07/26/2023 Negative     Buprenorphine, Screen, U* 07/26/2023 Negative     C-Reactive Protein 07/26/2023 1.74 (H)     Color, UA 07/26/2023 Yellow     Appearance, UA 07/26/2023 Clear     pH, UA 07/26/2023 5.5     Specific Gravity, UA 07/26/2023 1.020     Glucose, UA 07/26/2023 250 mg/dL (1+) (A)     Ketones, UA 07/26/2023 Trace (A)     Bilirubin, UA 07/26/2023 Negative     Blood, UA 07/26/2023 Small (1+) (A)     Protein, UA 07/26/2023 100 mg/dL (2+) (A)     Leuk Esterase, UA 07/26/2023 Trace (A)     Nitrite, UA 07/26/2023 Negative     Urobilinogen, UA 07/26/2023 0.2 E.U./dL     WBC 07/26/2023 5.34     RBC 07/26/2023 4.24     Hemoglobin 07/26/2023 12.8     Hematocrit 07/26/2023 39.8     MCV 07/26/2023 93.9     MCH 07/26/2023 30.2     MCHC 07/26/2023 32.2     RDW 07/26/2023 13.6     RDW-SD 07/26/2023 45.8     MPV 07/26/2023 9.2     Platelets 07/26/2023 236     Neutrophil  % 07/26/2023 57.7     Lymphocyte % 07/26/2023 29.6     Monocyte % 07/26/2023 9.2     Eosinophil % 07/26/2023 2.2     Basophil % 07/26/2023 0.7     Immature Grans % 07/26/2023 0.6 (H)     Neutrophils, Absolute 07/26/2023 3.08     Lymphocytes, Absolute 07/26/2023 1.58     Monocytes, Absolute 07/26/2023 0.49     Eosinophils, Absolute 07/26/2023 0.12     Basophils, Absolute 07/26/2023 0.04     Immature Grans, Absolute 07/26/2023 0.03     nRBC 07/26/2023 0.0     Fentanyl, Urine 07/26/2023 Negative     RBC, UA 07/26/2023 3-5 (A)     WBC, UA 07/26/2023 13-20 (A)     Bacteria, UA 07/26/2023 None Seen     Squamous Epithelial Cell* 07/26/2023 None Seen     Hyaline Casts, UA 07/26/2023 None Seen     Methodology 07/26/2023 Manual Light Microscopy     Urine Culture 07/26/2023 <25,000 CFU/mL Normal Urogenital Margo    Admission on 06/24/2023, Discharged on 06/24/2023   Component Date Value    Glucose 06/24/2023 246 (H)     BUN 06/24/2023 17     Creatinine 06/24/2023 0.71     Sodium 06/24/2023 136     Potassium 06/24/2023 4.3     Chloride 06/24/2023 98     CO2 06/24/2023 18.6 (L)     Calcium 06/24/2023 9.8     Total Protein 06/24/2023 7.7     Albumin 06/24/2023 4.3     ALT (SGPT) 06/24/2023 30     AST (SGOT) 06/24/2023 33 (H)     Alkaline Phosphatase 06/24/2023 106     Total Bilirubin 06/24/2023 0.4     Globulin 06/24/2023 3.4     A/G Ratio 06/24/2023 1.3     BUN/Creatinine Ratio 06/24/2023 23.9     Anion Gap 06/24/2023 19.4 (H)     eGFR 06/24/2023 112.5     PTT 06/24/2023 23.5 (L)     Protime 06/24/2023 13.4     INR 06/24/2023 0.97     Color, UA 06/24/2023 Yellow     Appearance, UA 06/24/2023 Cloudy (A)     pH, UA 06/24/2023 5.5     Specific Gravity, UA 06/24/2023 1.023     Glucose, UA 06/24/2023 Negative     Ketones, UA 06/24/2023 Trace (A)     Bilirubin, UA 06/24/2023 Negative     Blood, UA 06/24/2023 Large (3+) (A)     Protein, UA 06/24/2023 100 mg/dL (2+) (A)     Leuk Esterase, UA 06/24/2023 Small (1+) (A)     Nitrite, UA  06/24/2023 Negative     Urobilinogen, UA 06/24/2023 0.2 E.U./dL     WBC 06/24/2023 5.40     RBC 06/24/2023 4.33     Hemoglobin 06/24/2023 13.1     Hematocrit 06/24/2023 39.6     MCV 06/24/2023 91.5     MCH 06/24/2023 30.3     MCHC 06/24/2023 33.1     RDW 06/24/2023 13.5     RDW-SD 06/24/2023 44.9     MPV 06/24/2023 9.3     Platelets 06/24/2023 238     Neutrophil % 06/24/2023 60.0     Lymphocyte % 06/24/2023 29.8     Monocyte % 06/24/2023 7.6     Eosinophil % 06/24/2023 2.0     Basophil % 06/24/2023 0.4     Immature Grans % 06/24/2023 0.2     Neutrophils, Absolute 06/24/2023 3.24     Lymphocytes, Absolute 06/24/2023 1.61     Monocytes, Absolute 06/24/2023 0.41     Eosinophils, Absolute 06/24/2023 0.11     Basophils, Absolute 06/24/2023 0.02     Immature Grans, Absolute 06/24/2023 0.01     nRBC 06/24/2023 0.0     RBC, UA 06/24/2023 21-30 (A)     WBC, UA 06/24/2023 21-30 (A)     Bacteria, UA 06/24/2023 None Seen     Squamous Epithelial Cell* 06/24/2023 3-6 (A)     Hyaline Casts, UA 06/24/2023 None Seen     Methodology 06/24/2023 Automated Microscopy     HCG, Urine QL 06/24/2023 Negative     Urine Culture 06/24/2023 50,000 CFU/mL Normal Urogenital Margo    Admission on 05/24/2023, Discharged on 05/24/2023   Component Date Value    Glucose 05/24/2023 212 (H)     BUN 05/24/2023 6     Creatinine 05/24/2023 0.57     Sodium 05/24/2023 137     Potassium 05/24/2023 4.2     Chloride 05/24/2023 103     CO2 05/24/2023 18.7 (L)     Calcium 05/24/2023 9.6     Total Protein 05/24/2023 7.7     Albumin 05/24/2023 4.4     ALT (SGPT) 05/24/2023 36 (H)     AST (SGOT) 05/24/2023 32     Alkaline Phosphatase 05/24/2023 100     Total Bilirubin 05/24/2023 0.3     Globulin 05/24/2023 3.3     A/G Ratio 05/24/2023 1.3     BUN/Creatinine Ratio 05/24/2023 10.5     Anion Gap 05/24/2023 15.3 (H)     eGFR 05/24/2023 120.2     PTT 05/24/2023 28.8     Protime 05/24/2023 13.8     INR 05/24/2023 1.01     QT Interval 05/24/2023 384     QTC Interval  05/24/2023 474     HS Troponin T 05/24/2023 14 (H)     C-Reactive Protein 05/24/2023 2.46 (H)     COVID19 05/24/2023 Not Detected     Influenza A PCR 05/24/2023 Not Detected     Influenza B PCR 05/24/2023 Not Detected     WBC 05/24/2023 4.62     RBC 05/24/2023 3.98     Hemoglobin 05/24/2023 12.5     Hematocrit 05/24/2023 37.3     MCV 05/24/2023 93.7     MCH 05/24/2023 31.4     MCHC 05/24/2023 33.5     RDW 05/24/2023 13.2     RDW-SD 05/24/2023 45.1     MPV 05/24/2023 8.9     Platelets 05/24/2023 210     Neutrophil % 05/24/2023 56.4     Lymphocyte % 05/24/2023 31.0     Monocyte % 05/24/2023 9.7     Eosinophil % 05/24/2023 1.9     Basophil % 05/24/2023 0.6     Immature Grans % 05/24/2023 0.4     Neutrophils, Absolute 05/24/2023 2.60     Lymphocytes, Absolute 05/24/2023 1.43     Monocytes, Absolute 05/24/2023 0.45     Eosinophils, Absolute 05/24/2023 0.09     Basophils, Absolute 05/24/2023 0.03     Immature Grans, Absolute 05/24/2023 0.02     nRBC 05/24/2023 0.0     HS Troponin T 05/24/2023 14 (H)     Troponin T Delta 05/24/2023 0     Color, UA 05/24/2023 Yellow     Appearance, UA 05/24/2023 Cloudy (A)     pH, UA 05/24/2023 5.5     Specific Gravity, UA 05/24/2023 1.015     Glucose, UA 05/24/2023 Negative     Ketones, UA 05/24/2023 Negative     Bilirubin, UA 05/24/2023 Negative     Blood, UA 05/24/2023 Large (3+) (A)     Protein, UA 05/24/2023 30 mg/dL (1+) (A)     Leuk Esterase, UA 05/24/2023 Trace (A)     Nitrite, UA 05/24/2023 Negative     Urobilinogen, UA 05/24/2023 0.2 E.U./dL     HCG, Urine QL 05/24/2023 Negative     RBC, UA 05/24/2023 Too Numerous to Count (A)     WBC, UA 05/24/2023 6-12 (A)     Bacteria, UA 05/24/2023 None Seen     Squamous Epithelial Cell* 05/24/2023 0-2     Hyaline Casts, UA 05/24/2023 None Seen     Methodology 05/24/2023 Automated Microscopy     Urine Culture 05/24/2023 25,000 CFU/mL Mixed Margo Isolated    Office Visit on 05/22/2023   Component Date Value    Color 05/22/2023 Yellow      Clarity, UA 05/22/2023 Clear     Specific Gravity  05/22/2023 1.030     pH, Urine 05/22/2023 5.0     Leukocytes 05/22/2023 75 Keith/ul (A)     Nitrite, UA 05/22/2023 Negative     Protein, POC 05/22/2023 3+ (A)     Glucose, UA 05/22/2023 Negative     Ketones, UA 05/22/2023 Negative     Urobilinogen, UA 05/22/2023 Normal     Bilirubin 05/22/2023 Negative     Blood, UA 05/22/2023 3+ (A)     Lot Number 05/22/2023 [id number]     Expiration Date 05/22/2023 10,703,024     Urine Culture 05/22/2023 25,000 CFU/mL Mixed Margo Isolated    Admission on 05/17/2023, Discharged on 05/17/2023   Component Date Value    Glucose 05/17/2023 250 (H)     BUN 05/17/2023 15     Creatinine 05/17/2023 0.69     Sodium 05/17/2023 136     Potassium 05/17/2023 4.1     Chloride 05/17/2023 100     CO2 05/17/2023 18.8 (L)     Calcium 05/17/2023 9.7     Total Protein 05/17/2023 7.8     Albumin 05/17/2023 4.2     ALT (SGPT) 05/17/2023 32     AST (SGOT) 05/17/2023 36 (H)     Alkaline Phosphatase 05/17/2023 108     Total Bilirubin 05/17/2023 0.3     Globulin 05/17/2023 3.6     A/G Ratio 05/17/2023 1.2     BUN/Creatinine Ratio 05/17/2023 21.7     Anion Gap 05/17/2023 17.2 (H)     eGFR 05/17/2023 114.8     Lipase 05/17/2023 29     Lactate 05/17/2023 4.7 (C)     Extra Tube 05/17/2023 Hold for add-ons.     Extra Tube 05/17/2023 hold for add-on     Extra Tube 05/17/2023 Hold for add-ons.     Extra Tube 05/17/2023 Hold for add-ons.     WBC 05/17/2023 5.34     RBC 05/17/2023 3.91     Hemoglobin 05/17/2023 12.5     Hematocrit 05/17/2023 36.4     MCV 05/17/2023 93.1     MCH 05/17/2023 32.0     MCHC 05/17/2023 34.3     RDW 05/17/2023 14.0     RDW-SD 05/17/2023 46.9     MPV 05/17/2023 8.7     Platelets 05/17/2023 234     Neutrophil % 05/17/2023 61.1     Lymphocyte % 05/17/2023 27.3     Monocyte % 05/17/2023 8.4     Eosinophil % 05/17/2023 2.2     Basophil % 05/17/2023 0.6     Immature Grans % 05/17/2023 0.4     Neutrophils, Absolute 05/17/2023 3.26      Lymphocytes, Absolute 05/17/2023 1.46     Monocytes, Absolute 05/17/2023 0.45     Eosinophils, Absolute 05/17/2023 0.12     Basophils, Absolute 05/17/2023 0.03     Immature Grans, Absolute 05/17/2023 0.02     nRBC 05/17/2023 0.0     Lactate 05/17/2023 3.8 (C)    Admission on 05/11/2023, Discharged on 05/11/2023   Component Date Value    Glucose 05/11/2023 218 (H)     BUN 05/11/2023 16     Creatinine 05/11/2023 0.60     Sodium 05/11/2023 138     Potassium 05/11/2023 4.5     Chloride 05/11/2023 101     CO2 05/11/2023 19.2 (L)     Calcium 05/11/2023 9.9     Total Protein 05/11/2023 7.9     Albumin 05/11/2023 4.1     ALT (SGPT) 05/11/2023 39 (H)     AST (SGOT) 05/11/2023 36 (H)     Alkaline Phosphatase 05/11/2023 112     Total Bilirubin 05/11/2023 0.3     Globulin 05/11/2023 3.8     A/G Ratio 05/11/2023 1.1     BUN/Creatinine Ratio 05/11/2023 26.7 (H)     Anion Gap 05/11/2023 17.8 (H)     eGFR 05/11/2023 118.7     Lipase 05/11/2023 30     Color, UA 05/11/2023 Yellow     Appearance, UA 05/11/2023 Clear     pH, UA 05/11/2023 5.5     Specific Gravity, UA 05/11/2023 1.018     Glucose, UA 05/11/2023 100 mg/dL (Trace) (A)     Ketones, UA 05/11/2023 Negative     Bilirubin, UA 05/11/2023 Negative     Blood, UA 05/11/2023 Negative     Protein, UA 05/11/2023 30 mg/dL (1+) (A)     Leuk Esterase, UA 05/11/2023 Trace (A)     Nitrite, UA 05/11/2023 Negative     Urobilinogen, UA 05/11/2023 0.2 E.U./dL     C-Reactive Protein 05/11/2023 1.38 (H)     WBC 05/11/2023 5.04     RBC 05/11/2023 3.84     Hemoglobin 05/11/2023 11.9 (L)     Hematocrit 05/11/2023 35.3     MCV 05/11/2023 91.9     MCH 05/11/2023 31.0     MCHC 05/11/2023 33.7     RDW 05/11/2023 14.3     RDW-SD 05/11/2023 47.5     MPV 05/11/2023 9.0     Platelets 05/11/2023 217     Neutrophil % 05/11/2023 61.1     Lymphocyte % 05/11/2023 27.8     Monocyte % 05/11/2023 7.3     Eosinophil % 05/11/2023 3.0     Basophil % 05/11/2023 0.6     Immature Grans % 05/11/2023 0.2     Neutrophils,  Absolute 05/11/2023 3.08     Lymphocytes, Absolute 05/11/2023 1.40     Monocytes, Absolute 05/11/2023 0.37     Eosinophils, Absolute 05/11/2023 0.15     Basophils, Absolute 05/11/2023 0.03     Immature Grans, Absolute 05/11/2023 0.01     nRBC 05/11/2023 0.0     Extra Tube 05/11/2023 Hold for add-ons.     Extra Tube 05/11/2023 hold for add-on     Extra Tube 05/11/2023 Hold for add-ons.     Extra Tube 05/11/2023 Hold for add-ons.     RBC, UA 05/11/2023 0-2     WBC, UA 05/11/2023 13-20 (A)     Bacteria, UA 05/11/2023 None Seen     Squamous Epithelial Cell* 05/11/2023 3-6 (A)     Hyaline Casts, UA 05/11/2023 None Seen     Methodology 05/11/2023 Automated Microscopy     Urine Culture 05/11/2023 >100,000 CFU/mL Mixed Margo Isolated    Admission on 05/05/2023, Discharged on 05/05/2023   Component Date Value    Glucose 05/05/2023 258 (H)     BUN 05/05/2023 14     Creatinine 05/05/2023 0.73     Sodium 05/05/2023 137     Potassium 05/05/2023 4.1     Chloride 05/05/2023 100     CO2 05/05/2023 21.5 (L)     Calcium 05/05/2023 9.9     Total Protein 05/05/2023 8.2     Albumin 05/05/2023 4.2     ALT (SGPT) 05/05/2023 26     AST (SGOT) 05/05/2023 24     Alkaline Phosphatase 05/05/2023 108     Total Bilirubin 05/05/2023 0.3     Globulin 05/05/2023 4.0     A/G Ratio 05/05/2023 1.1     BUN/Creatinine Ratio 05/05/2023 19.2     Anion Gap 05/05/2023 15.5 (H)     eGFR 05/05/2023 108.8     Lipase 05/05/2023 42     Lactate 05/05/2023 5.0 (C)     Sed Rate 05/05/2023 37 (H)     C-Reactive Protein 05/05/2023 1.45 (H)     WBC 05/05/2023 5.36     RBC 05/05/2023 3.93     Hemoglobin 05/05/2023 12.3     Hematocrit 05/05/2023 36.7     MCV 05/05/2023 93.4     MCH 05/05/2023 31.3     MCHC 05/05/2023 33.5     RDW 05/05/2023 14.6     RDW-SD 05/05/2023 49.3     MPV 05/05/2023 8.5     Platelets 05/05/2023 240     Neutrophil % 05/05/2023 51.0     Lymphocyte % 05/05/2023 35.4     Monocyte % 05/05/2023 9.3     Eosinophil % 05/05/2023 3.4     Basophil %  05/05/2023 0.7     Immature Grans % 05/05/2023 0.2     Neutrophils, Absolute 05/05/2023 2.73     Lymphocytes, Absolute 05/05/2023 1.90     Monocytes, Absolute 05/05/2023 0.50     Eosinophils, Absolute 05/05/2023 0.18     Basophils, Absolute 05/05/2023 0.04     Immature Grans, Absolute 05/05/2023 0.01     nRBC 05/05/2023 0.0     Extra Tube 05/05/2023 Hold for add-ons.     Extra Tube 05/05/2023 hold for add-on     Extra Tube 05/05/2023 Hold for add-ons.     Extra Tube 05/05/2023 Hold for add-ons.     proBNP 05/05/2023 <36.0     Blood Culture 05/05/2023 No growth at 5 days     Blood Culture 05/05/2023 No growth at 5 days    Admission on 05/01/2023, Discharged on 05/01/2023   Component Date Value    Glucose 05/01/2023 204 (H)     BUN 05/01/2023 14     Creatinine 05/01/2023 0.71     Sodium 05/01/2023 130 (L)     Potassium 05/01/2023 4.0     Chloride 05/01/2023 96 (L)     CO2 05/01/2023 16.5 (L)     Calcium 05/01/2023 10.2     Total Protein 05/01/2023 8.6 (H)     Albumin 05/01/2023 4.1     ALT (SGPT) 05/01/2023 26     AST (SGOT) 05/01/2023 33 (H)     Alkaline Phosphatase 05/01/2023 113     Total Bilirubin 05/01/2023 0.4     Globulin 05/01/2023 4.5     A/G Ratio 05/01/2023 0.9     BUN/Creatinine Ratio 05/01/2023 19.7     Anion Gap 05/01/2023 17.5 (H)     eGFR 05/01/2023 112.5     Color, UA 05/01/2023 Orange (A)     Appearance, UA 05/01/2023 Cloudy (A)     pH, UA 05/01/2023 <=5.0     Specific Gravity, UA 05/01/2023 1.026     Glucose, UA 05/01/2023 Negative     Ketones, UA 05/01/2023 Negative     Bilirubin, UA 05/01/2023 Negative     Blood, UA 05/01/2023 Large (3+) (A)     Protein, UA 05/01/2023 100 mg/dL (2+) (A)     Leuk Esterase, UA 05/01/2023 Moderate (2+) (A)     Nitrite, UA 05/01/2023 Negative     Urobilinogen, UA 05/01/2023 0.2 E.U./dL     Lipase 05/01/2023 27     WBC 05/01/2023 5.88     RBC 05/01/2023 4.12     Hemoglobin 05/01/2023 12.6     Hematocrit 05/01/2023 38.5     MCV 05/01/2023 93.4     MCH 05/01/2023 30.6      MCHC 05/01/2023 32.7     RDW 05/01/2023 15.2     RDW-SD 05/01/2023 51.9     MPV 05/01/2023 8.4     Platelets 05/01/2023 280     Neutrophil % 05/01/2023 57.7     Lymphocyte % 05/01/2023 30.4     Monocyte % 05/01/2023 7.8     Eosinophil % 05/01/2023 3.1     Basophil % 05/01/2023 0.7     Immature Grans % 05/01/2023 0.3     Neutrophils, Absolute 05/01/2023 3.39     Lymphocytes, Absolute 05/01/2023 1.79     Monocytes, Absolute 05/01/2023 0.46     Eosinophils, Absolute 05/01/2023 0.18     Basophils, Absolute 05/01/2023 0.04     Immature Grans, Absolute 05/01/2023 0.02     nRBC 05/01/2023 0.0     Extra Tube 05/01/2023 Hold for add-ons.     Extra Tube 05/01/2023 hold for add-on     RBC, UA 05/01/2023 Too Numerous to Count (A)     WBC, UA 05/01/2023 Too Numerous to Count (A)     Bacteria, UA 05/01/2023 1+ (A)     Squamous Epithelial Cell* 05/01/2023 Unable to determine due to loaded field (A)     Hyaline Casts, UA 05/01/2023 Unable to determine due to loaded field     Methodology 05/01/2023 Manual Light Microscopy    Admission on 04/09/2023, Discharged on 04/09/2023   Component Date Value    Color, UA 04/09/2023 Yellow     Appearance, UA 04/09/2023 Clear     pH, UA 04/09/2023 5.5     Specific Gravity, UA 04/09/2023 >1.030 (H)     Glucose, UA 04/09/2023 100 mg/dL (Trace) (A)     Ketones, UA 04/09/2023 Negative     Bilirubin, UA 04/09/2023 Negative     Blood, UA 04/09/2023 Negative     Protein, UA 04/09/2023 Trace (A)     Leuk Esterase, UA 04/09/2023 Trace (A)     Nitrite, UA 04/09/2023 Negative     Urobilinogen, UA 04/09/2023 0.2 E.U./dL     Extra Tube 04/09/2023 Hold for add-ons.     Extra Tube 04/09/2023 hold for add-on     Extra Tube 04/09/2023 Hold for add-ons.     Glucose 04/09/2023 278 (H)     BUN 04/09/2023 16     Creatinine 04/09/2023 0.71     Sodium 04/09/2023 136     Potassium 04/09/2023 4.6     Chloride 04/09/2023 99     CO2 04/09/2023 19.1 (L)     Calcium 04/09/2023 9.9     Total Protein 04/09/2023 8.3      Albumin 04/09/2023 4.1     ALT (SGPT) 04/09/2023 27     AST (SGOT) 04/09/2023 28     Alkaline Phosphatase 04/09/2023 138 (H)     Total Bilirubin 04/09/2023 0.4     Globulin 04/09/2023 4.2     A/G Ratio 04/09/2023 1.0     BUN/Creatinine Ratio 04/09/2023 22.5     Anion Gap 04/09/2023 17.9 (H)     eGFR 04/09/2023 112.5     Lipase 04/09/2023 33     C-Reactive Protein 04/09/2023 1.71 (H)     HCG Qualitative 04/09/2023 Negative     WBC 04/09/2023 5.00     RBC 04/09/2023 3.86     Hemoglobin 04/09/2023 11.7 (L)     Hematocrit 04/09/2023 34.9     MCV 04/09/2023 90.4     MCH 04/09/2023 30.3     MCHC 04/09/2023 33.5     RDW 04/09/2023 18.5 (H)     RDW-SD 04/09/2023 60.7 (H)     MPV 04/09/2023 8.5     Platelets 04/09/2023 249     Neutrophil % 04/09/2023 65.8     Lymphocyte % 04/09/2023 25.0     Monocyte % 04/09/2023 6.0     Eosinophil % 04/09/2023 2.2     Basophil % 04/09/2023 0.8     Immature Grans % 04/09/2023 0.2     Neutrophils, Absolute 04/09/2023 3.29     Lymphocytes, Absolute 04/09/2023 1.25     Monocytes, Absolute 04/09/2023 0.30     Eosinophils, Absolute 04/09/2023 0.11     Basophils, Absolute 04/09/2023 0.04     Immature Grans, Absolute 04/09/2023 0.01     nRBC 04/09/2023 0.0     RBC, UA 04/09/2023 0-2     WBC, UA 04/09/2023 13-20 (A)     Bacteria, UA 04/09/2023 None Seen     Squamous Epithelial Cell* 04/09/2023 0-2     Yeast, UA 04/09/2023 Small/1+ Budding Yeast     Hyaline Casts, UA 04/09/2023 0-2     Methodology 04/09/2023 Manual Light Microscopy     Urine Culture 04/09/2023 50,000 CFU/mL Yeast isolated (A)     Glucose 04/09/2023 184 (H)    There may be more visits with results that are not included.         SCANNED LABS:                      Assessment & Plan   Natalia Arzate is a very pleasant 38 y.o.  female who presents in follow up appointment for further management and evaluation of heterozygous Factor V Leiden mutation.     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 She did have an IVC filter in place which has since been removed. Patient was previously evaluated by Dr. De Dios after she was found to have a provoked DVT and bilateral PE after hormone therapy. She underwent workup and was found to be heterozygous for the FVL mutation and heterozygous [id number] MTHFR mutation. Given the provoked nature it was recommended that she continue anticoagulation for one year in which she was taking Coumadin.   - Homocysteine level is normal. Antithrombin panel shows an elevated activity level with normal antigen level. This is likely due to Eliquis use and carries no clinical significance. Protein S activity level is normal, while Protein C is elevated and likely secondary to Eliquis use. Protein C antigen is normal while S antigen studies are elevated and carries no clinical significance. Antiphospholipid studies ar negative for lupus anticoagulant and beta 2 glycoprotein and anticardiolipin antibodies are normal.   - Regardless of workup would recommend life long anticoagulation given her history of recurrent thrombosis. She also carries a history significant for atrial fibrillation and as per patient Neurology also recommended ongoing anticoagulation unless there is a contraindication or side effects. If she develops thrombosis while on Eliquis would than recommend Coumadin.  - I discussed the risks and benefits as well as the bleeding risk profile with each anticoagulation treatment (such as Warfarin, Xarelto, Eliquis) and the risk of bleeding that can occur while being on any anticoagulation. She understands that should spontaneous or abnormal bleeding occur she must seek immediate medical attention. I also advised her against high impact sports/activities while on anticoagulation that would place him at increased risk of bleeding. No NSAID use while on treatment as this can increase the risk of bleeding.  - I have also advised that she obtain a  medical band while on anticoagulation so that this would alert paramedics, medical personal, etc that she is on anticoagulation should she ever require medical attention.    Recurrent UTIs and nephrolithiasis  - She follows with Dr. Motley as well as Dr. Lord at . She is s/p left nephrectomy October 2022.     Normocytic anemia  - Likely secondary to bone marrow suppression from underlying inflammation (elevated ferritin and CRP)  - Will repeat labs in six weeks along with B12, folate.     ACO / CHERELLE/Other  Quality measures  -  Natalia Arzate  reports that she has never smoked. She has never been exposed to tobacco smoke. She has never used smokeless tobacco.  -  Natalia Arzate received 2022 flu vaccine.  -  Natalia Arzate reports a pain score of 8.  Given her pain assessment as noted, treatment options were discussed and the following options were decided upon as a follow-up plan to address the patient's pain: referral to Primary Care for assistance in pain treatment guidance.  -  Current outpatient and discharge medications have been reconciled for the patient.  Reviewed by: Alison Constantino MD      I will have the patient return for labs in 6 weeks and follow up appointment in three months with labs (CBC, iron panel, ferritin, CRP). If she is doing well from a thrombosis stand point will thereafter have her follow with her PCP. She understands that should she have any questions or concerns prior to her appointment she should give us a call at any time and I would be happy to see her sooner. It was a pleasure to see this patient in clinic today, thank you for allowing me to participate in the care of this patient.      Alison Constantino MD   08/14/23   12:53 EDT

## 2023-08-14 ENCOUNTER — LAB (OUTPATIENT)
Dept: ONCOLOGY | Facility: CLINIC | Age: 38
End: 2023-08-14
Payer: COMMERCIAL

## 2023-08-14 ENCOUNTER — OFFICE VISIT (OUTPATIENT)
Dept: ONCOLOGY | Facility: CLINIC | Age: 38
End: 2023-08-14
Payer: COMMERCIAL

## 2023-08-14 VITALS
OXYGEN SATURATION: 97 % | TEMPERATURE: 97.3 F | RESPIRATION RATE: 18 BRPM | DIASTOLIC BLOOD PRESSURE: 95 MMHG | SYSTOLIC BLOOD PRESSURE: 143 MMHG | HEART RATE: 90 BPM

## 2023-08-14 DIAGNOSIS — D64.9 NORMOCYTIC ANEMIA: ICD-10-CM

## 2023-08-14 DIAGNOSIS — D68.51 FACTOR V LEIDEN: Primary | ICD-10-CM

## 2023-08-14 DIAGNOSIS — D68.51 FACTOR V LEIDEN: ICD-10-CM

## 2023-08-14 LAB
ALBUMIN SERPL-MCNC: 4 G/DL (ref 3.5–5.2)
ALBUMIN/GLOB SERPL: 1.2 G/DL
ALP SERPL-CCNC: 106 U/L (ref 39–117)
ALT SERPL W P-5'-P-CCNC: 18 U/L (ref 1–33)
ANION GAP SERPL CALCULATED.3IONS-SCNC: 13.3 MMOL/L (ref 5–15)
AST SERPL-CCNC: 39 U/L (ref 1–32)
BASOPHILS # BLD AUTO: 0.03 10*3/MM3 (ref 0–0.2)
BASOPHILS NFR BLD AUTO: 0.5 % (ref 0–1.5)
BILIRUB SERPL-MCNC: 0.4 MG/DL (ref 0–1.2)
BUN SERPL-MCNC: 15 MG/DL (ref 6–20)
BUN/CREAT SERPL: 20.3 (ref 7–25)
CALCIUM SPEC-SCNC: 9.4 MG/DL (ref 8.6–10.5)
CHLORIDE SERPL-SCNC: 99 MMOL/L (ref 98–107)
CLUMPED PLATELETS: NORMAL
CO2 SERPL-SCNC: 20.7 MMOL/L (ref 22–29)
CREAT SERPL-MCNC: 0.74 MG/DL (ref 0.57–1)
CRP SERPL-MCNC: 3.25 MG/DL (ref 0–0.5)
DEPRECATED RDW RBC AUTO: 44 FL (ref 37–54)
EGFRCR SERPLBLD CKD-EPI 2021: 107 ML/MIN/1.73
EOSINOPHIL # BLD AUTO: 0.13 10*3/MM3 (ref 0–0.4)
EOSINOPHIL NFR BLD AUTO: 2.3 % (ref 0.3–6.2)
ERYTHROCYTE [DISTWIDTH] IN BLOOD BY AUTOMATED COUNT: 13.4 % (ref 12.3–15.4)
FERRITIN SERPL-MCNC: 281.4 NG/ML (ref 13–150)
GLOBULIN UR ELPH-MCNC: 3.4 GM/DL
GLUCOSE SERPL-MCNC: 302 MG/DL (ref 65–99)
HCT VFR BLD AUTO: 32.5 % (ref 34–46.6)
HGB BLD-MCNC: 10.6 G/DL (ref 12–15.9)
IMM GRANULOCYTES # BLD AUTO: 0.04 10*3/MM3 (ref 0–0.05)
IMM GRANULOCYTES NFR BLD AUTO: 0.7 % (ref 0–0.5)
IRON 24H UR-MRATE: 61 MCG/DL (ref 37–145)
IRON SATN MFR SERPL: 14 % (ref 20–50)
LYMPHOCYTES # BLD AUTO: 1.58 10*3/MM3 (ref 0.7–3.1)
LYMPHOCYTES NFR BLD AUTO: 28 % (ref 19.6–45.3)
MCH RBC QN AUTO: 29.8 PG (ref 26.6–33)
MCHC RBC AUTO-ENTMCNC: 32.6 G/DL (ref 31.5–35.7)
MCV RBC AUTO: 91.3 FL (ref 79–97)
MONOCYTES # BLD AUTO: 0.47 10*3/MM3 (ref 0.1–0.9)
MONOCYTES NFR BLD AUTO: 8.3 % (ref 5–12)
NEUTROPHILS NFR BLD AUTO: 3.39 10*3/MM3 (ref 1.7–7)
NEUTROPHILS NFR BLD AUTO: 60.2 % (ref 42.7–76)
NRBC BLD AUTO-RTO: 0 /100 WBC (ref 0–0.2)
PLAT MORPH BLD: NORMAL
PLATELET # BLD AUTO: 254 10*3/MM3 (ref 140–450)
PMV BLD AUTO: 9.5 FL (ref 6–12)
POLYCHROMASIA BLD QL SMEAR: NORMAL
POTASSIUM SERPL-SCNC: 4 MMOL/L (ref 3.5–5.2)
PROT SERPL-MCNC: 7.4 G/DL (ref 6–8.5)
RBC # BLD AUTO: 3.56 10*6/MM3 (ref 3.77–5.28)
SODIUM SERPL-SCNC: 133 MMOL/L (ref 136–145)
TIBC SERPL-MCNC: 432 MCG/DL (ref 298–536)
TRANSFERRIN SERPL-MCNC: 290 MG/DL (ref 200–360)
WBC NRBC COR # BLD: 5.64 10*3/MM3 (ref 3.4–10.8)

## 2023-08-14 PROCEDURE — 3077F SYST BP >= 140 MM HG: CPT | Performed by: INTERNAL MEDICINE

## 2023-08-14 PROCEDURE — 82728 ASSAY OF FERRITIN: CPT | Performed by: INTERNAL MEDICINE

## 2023-08-14 PROCEDURE — 1160F RVW MEDS BY RX/DR IN RCRD: CPT | Performed by: INTERNAL MEDICINE

## 2023-08-14 PROCEDURE — 84466 ASSAY OF TRANSFERRIN: CPT | Performed by: INTERNAL MEDICINE

## 2023-08-14 PROCEDURE — 1159F MED LIST DOCD IN RCRD: CPT | Performed by: INTERNAL MEDICINE

## 2023-08-14 PROCEDURE — 3080F DIAST BP >= 90 MM HG: CPT | Performed by: INTERNAL MEDICINE

## 2023-08-14 PROCEDURE — 80053 COMPREHEN METABOLIC PANEL: CPT | Performed by: INTERNAL MEDICINE

## 2023-08-14 PROCEDURE — 99214 OFFICE O/P EST MOD 30 MIN: CPT | Performed by: INTERNAL MEDICINE

## 2023-08-14 PROCEDURE — 86140 C-REACTIVE PROTEIN: CPT | Performed by: INTERNAL MEDICINE

## 2023-08-14 PROCEDURE — 1125F AMNT PAIN NOTED PAIN PRSNT: CPT | Performed by: INTERNAL MEDICINE

## 2023-08-14 PROCEDURE — 83540 ASSAY OF IRON: CPT | Performed by: INTERNAL MEDICINE

## 2023-08-14 PROCEDURE — 85025 COMPLETE CBC W/AUTO DIFF WBC: CPT | Performed by: INTERNAL MEDICINE

## 2023-08-14 PROCEDURE — 85007 BL SMEAR W/DIFF WBC COUNT: CPT | Performed by: INTERNAL MEDICINE

## 2023-08-14 NOTE — PROGRESS NOTES
Venipuncture Blood Specimen Collection  Venipuncture performed in right hand by Elisha Irving MA with good hemostasis. Patient tolerated the procedure well without complications.   08/14/23   Elisha Irving MA

## 2023-08-15 ENCOUNTER — HOSPITAL ENCOUNTER (EMERGENCY)
Facility: HOSPITAL | Age: 38
Discharge: HOME | End: 2023-08-15
Attending: STUDENT IN AN ORGANIZED HEALTH CARE EDUCATION/TRAINING PROGRAM | Admitting: STUDENT IN AN ORGANIZED HEALTH CARE EDUCATION/TRAINING PROGRAM
Payer: COMMERCIAL

## 2023-08-15 ENCOUNTER — APPOINTMENT (OUTPATIENT)
Dept: CT IMAGING | Facility: HOSPITAL | Age: 38
End: 2023-08-15
Payer: COMMERCIAL

## 2023-08-15 VITALS
DIASTOLIC BLOOD PRESSURE: 82 MMHG | SYSTOLIC BLOOD PRESSURE: 132 MMHG | BODY MASS INDEX: 46.95 KG/M2 | WEIGHT: 275 LBS | RESPIRATION RATE: 20 BRPM | HEART RATE: 100 BPM | OXYGEN SATURATION: 94 % | HEIGHT: 64 IN | TEMPERATURE: 98.5 F

## 2023-08-15 DIAGNOSIS — N23 RENAL COLIC: ICD-10-CM

## 2023-08-15 DIAGNOSIS — N39.0 ACUTE UTI: Primary | ICD-10-CM

## 2023-08-15 LAB
ALBUMIN SERPL-MCNC: 4.3 G/DL (ref 3.5–5.2)
ALBUMIN/GLOB SERPL: 1.2 G/DL
ALP SERPL-CCNC: 112 U/L (ref 39–117)
ALT SERPL W P-5'-P-CCNC: 20 U/L (ref 1–33)
ANION GAP SERPL CALCULATED.3IONS-SCNC: 14.8 MMOL/L (ref 5–15)
AST SERPL-CCNC: 38 U/L (ref 1–32)
BACTERIA UR QL AUTO: ABNORMAL /HPF
BASOPHILS # BLD AUTO: 0.02 10*3/MM3 (ref 0–0.2)
BASOPHILS NFR BLD AUTO: 0.3 % (ref 0–1.5)
BILIRUB SERPL-MCNC: 0.5 MG/DL (ref 0–1.2)
BILIRUB UR QL STRIP: NEGATIVE
BUN SERPL-MCNC: 18 MG/DL (ref 6–20)
BUN/CREAT SERPL: 24 (ref 7–25)
CALCIUM SPEC-SCNC: 9.7 MG/DL (ref 8.6–10.5)
CHLORIDE SERPL-SCNC: 97 MMOL/L (ref 98–107)
CLARITY UR: ABNORMAL
CO2 SERPL-SCNC: 23.2 MMOL/L (ref 22–29)
COLOR UR: ABNORMAL
CREAT SERPL-MCNC: 0.75 MG/DL (ref 0.57–1)
CRP SERPL-MCNC: 3.04 MG/DL (ref 0–0.5)
DEPRECATED RDW RBC AUTO: 42.4 FL (ref 37–54)
EGFRCR SERPLBLD CKD-EPI 2021: 105.3 ML/MIN/1.73
EOSINOPHIL # BLD AUTO: 0.13 10*3/MM3 (ref 0–0.4)
EOSINOPHIL NFR BLD AUTO: 2.1 % (ref 0.3–6.2)
ERYTHROCYTE [DISTWIDTH] IN BLOOD BY AUTOMATED COUNT: 13.4 % (ref 12.3–15.4)
GLOBULIN UR ELPH-MCNC: 3.6 GM/DL
GLUCOSE SERPL-MCNC: 327 MG/DL (ref 65–99)
GLUCOSE UR STRIP-MCNC: ABNORMAL MG/DL
HCT VFR BLD AUTO: 32.2 % (ref 34–46.6)
HGB BLD-MCNC: 10.7 G/DL (ref 12–15.9)
HGB UR QL STRIP.AUTO: ABNORMAL
HYALINE CASTS UR QL AUTO: ABNORMAL /LPF
IMM GRANULOCYTES # BLD AUTO: 0.05 10*3/MM3 (ref 0–0.05)
IMM GRANULOCYTES NFR BLD AUTO: 0.8 % (ref 0–0.5)
KETONES UR QL STRIP: NEGATIVE
LEUKOCYTE ESTERASE UR QL STRIP.AUTO: ABNORMAL
LYMPHOCYTES # BLD AUTO: 1.7 10*3/MM3 (ref 0.7–3.1)
LYMPHOCYTES NFR BLD AUTO: 27.6 % (ref 19.6–45.3)
MCH RBC QN AUTO: 29.4 PG (ref 26.6–33)
MCHC RBC AUTO-ENTMCNC: 33.2 G/DL (ref 31.5–35.7)
MCV RBC AUTO: 88.5 FL (ref 79–97)
MONOCYTES # BLD AUTO: 0.43 10*3/MM3 (ref 0.1–0.9)
MONOCYTES NFR BLD AUTO: 7 % (ref 5–12)
NEUTROPHILS NFR BLD AUTO: 3.82 10*3/MM3 (ref 1.7–7)
NEUTROPHILS NFR BLD AUTO: 62.2 % (ref 42.7–76)
NITRITE UR QL STRIP: POSITIVE
NRBC BLD AUTO-RTO: 0 /100 WBC (ref 0–0.2)
PH UR STRIP.AUTO: 6.5 [PH] (ref 5–8)
PLATELET # BLD AUTO: 247 10*3/MM3 (ref 140–450)
PMV BLD AUTO: 8.8 FL (ref 6–12)
POTASSIUM SERPL-SCNC: 4.4 MMOL/L (ref 3.5–5.2)
PROT SERPL-MCNC: 7.9 G/DL (ref 6–8.5)
PROT UR QL STRIP: ABNORMAL
RBC # BLD AUTO: 3.64 10*6/MM3 (ref 3.77–5.28)
RBC # UR STRIP: ABNORMAL /HPF
REF LAB TEST METHOD: ABNORMAL
SODIUM SERPL-SCNC: 135 MMOL/L (ref 136–145)
SP GR UR STRIP: 1.01 (ref 1–1.03)
SQUAMOUS #/AREA URNS HPF: ABNORMAL /HPF
UROBILINOGEN UR QL STRIP: ABNORMAL
WBC # UR STRIP: ABNORMAL /HPF
WBC NRBC COR # BLD: 6.15 10*3/MM3 (ref 3.4–10.8)

## 2023-08-15 PROCEDURE — 74176 CT ABD & PELVIS W/O CONTRAST: CPT | Performed by: RADIOLOGY

## 2023-08-15 PROCEDURE — 96374 THER/PROPH/DIAG INJ IV PUSH: CPT

## 2023-08-15 PROCEDURE — 80053 COMPREHEN METABOLIC PANEL: CPT | Performed by: PHYSICIAN ASSISTANT

## 2023-08-15 PROCEDURE — 25010000002 MORPHINE PER 10 MG: Performed by: STUDENT IN AN ORGANIZED HEALTH CARE EDUCATION/TRAINING PROGRAM

## 2023-08-15 PROCEDURE — 99284 EMERGENCY DEPT VISIT MOD MDM: CPT

## 2023-08-15 PROCEDURE — 25010000002 ONDANSETRON PER 1 MG: Performed by: PHYSICIAN ASSISTANT

## 2023-08-15 PROCEDURE — 25010000002 ORPHENADRINE CITRATE PER 60 MG: Performed by: PHYSICIAN ASSISTANT

## 2023-08-15 PROCEDURE — 81001 URINALYSIS AUTO W/SCOPE: CPT | Performed by: PHYSICIAN ASSISTANT

## 2023-08-15 PROCEDURE — 85025 COMPLETE CBC W/AUTO DIFF WBC: CPT | Performed by: PHYSICIAN ASSISTANT

## 2023-08-15 PROCEDURE — 96375 TX/PRO/DX INJ NEW DRUG ADDON: CPT

## 2023-08-15 PROCEDURE — 86140 C-REACTIVE PROTEIN: CPT | Performed by: PHYSICIAN ASSISTANT

## 2023-08-15 PROCEDURE — 74176 CT ABD & PELVIS W/O CONTRAST: CPT

## 2023-08-15 PROCEDURE — 87086 URINE CULTURE/COLONY COUNT: CPT | Performed by: PHYSICIAN ASSISTANT

## 2023-08-15 RX ORDER — NITROFURANTOIN 25; 75 MG/1; MG/1
100 CAPSULE ORAL 2 TIMES DAILY
Qty: 14 CAPSULE | Refills: 0 | Status: SHIPPED | OUTPATIENT
Start: 2023-08-15

## 2023-08-15 RX ORDER — NITROFURANTOIN 25; 75 MG/1; MG/1
100 CAPSULE ORAL ONCE
Status: COMPLETED | OUTPATIENT
Start: 2023-08-15 | End: 2023-08-15

## 2023-08-15 RX ORDER — MORPHINE SULFATE 2 MG/ML
2 INJECTION, SOLUTION INTRAMUSCULAR; INTRAVENOUS ONCE
Status: COMPLETED | OUTPATIENT
Start: 2023-08-15 | End: 2023-08-15

## 2023-08-15 RX ORDER — ONDANSETRON 2 MG/ML
4 INJECTION INTRAMUSCULAR; INTRAVENOUS ONCE
Status: COMPLETED | OUTPATIENT
Start: 2023-08-15 | End: 2023-08-15

## 2023-08-15 RX ORDER — TIZANIDINE 4 MG/1
4 TABLET ORAL EVERY 8 HOURS PRN
Qty: 30 TABLET | Refills: 0 | Status: SHIPPED | OUTPATIENT
Start: 2023-08-15

## 2023-08-15 RX ORDER — PROCHLORPERAZINE EDISYLATE 5 MG/ML
10 INJECTION INTRAMUSCULAR; INTRAVENOUS ONCE
Status: DISCONTINUED | OUTPATIENT
Start: 2023-08-15 | End: 2023-08-15

## 2023-08-15 RX ORDER — SODIUM CHLORIDE 0.9 % (FLUSH) 0.9 %
10 SYRINGE (ML) INJECTION AS NEEDED
Status: DISCONTINUED | OUTPATIENT
Start: 2023-08-15 | End: 2023-08-15 | Stop reason: HOSPADM

## 2023-08-15 RX ORDER — ORPHENADRINE CITRATE 30 MG/ML
60 INJECTION INTRAMUSCULAR; INTRAVENOUS ONCE
Status: COMPLETED | OUTPATIENT
Start: 2023-08-15 | End: 2023-08-15

## 2023-08-15 RX ADMIN — ORPHENADRINE CITRATE 60 MG: 60 INJECTION INTRAMUSCULAR; INTRAVENOUS at 03:20

## 2023-08-15 RX ADMIN — MORPHINE SULFATE 2 MG: 2 INJECTION, SOLUTION INTRAMUSCULAR; INTRAVENOUS at 04:26

## 2023-08-15 RX ADMIN — SODIUM CHLORIDE 1000 ML: 9 INJECTION, SOLUTION INTRAVENOUS at 03:19

## 2023-08-15 RX ADMIN — NITROFURANTOIN MONOHYDRATE/MACROCRYSTALLINE 100 MG: 25; 75 CAPSULE ORAL at 05:29

## 2023-08-15 RX ADMIN — ONDANSETRON 4 MG: 2 INJECTION INTRAMUSCULAR; INTRAVENOUS at 03:19

## 2023-08-15 NOTE — ED PROVIDER NOTES
Subjective     History provided by:  Patient  Flank Pain  Pain location:  R flank  Pain quality: gnawing    Pain radiates to:  Suprapubic region  Pain severity:  Moderate  Onset quality:  Sudden  Duration:  1 day  Timing:  Intermittent  Progression:  Waxing and waning  Chronicity:  Chronic  Relieved by:  Nothing  Associated symptoms: nausea and vomiting    Associated symptoms: no chest pain, no dysuria and no fever    Risk factors: obesity      Review of Systems   Constitutional: Negative.  Negative for fever.   HENT: Negative.     Respiratory: Negative.     Cardiovascular: Negative.  Negative for chest pain.   Gastrointestinal:  Positive for nausea and vomiting. Negative for abdominal pain.   Endocrine: Negative.    Genitourinary:  Positive for flank pain. Negative for dysuria.   Skin: Negative.    Neurological: Negative.    Psychiatric/Behavioral: Negative.     All other systems reviewed and are negative.    Past Medical History:   Diagnosis Date    A-fib     Abnormal ECG     Anemia     Anxiety     Asthma     Cancer     Ovarian    Depression     Diabetes mellitus     DVT (deep venous thrombosis)     Factor 5 Leiden mutation, heterozygous     Fibroid     GERD (gastroesophageal reflux disease)     Gout     H/O abdominal abscess     History of sepsis     History of transfusion     Hyperlipidemia     Hypothyroid     Kidney stone     Migraines     Neuropathy     Ovarian cancer 02/2021    Ovarian cyst     PE (pulmonary embolism)     Polycystic ovary syndrome     Preeclampsia     Rh incompatibility     Stroke     TIA (transient ischemic attack)     Urinary tract infection     Varicella        Allergies   Allergen Reactions    Toradol [Ketorolac Tromethamine] Anaphylaxis and Hives    Haldol [Haloperidol] Hives and Mental Status Change    Tramadol Hives and Swelling    Amoxicillin Hives and Rash    Penicillins Hives and Rash       Past Surgical History:   Procedure Laterality Date    ABDOMINAL SURGERY      CARDIAC  CATHETERIZATION       SECTION      CHOLECYSTECTOMY      COLONOSCOPY      ENDOSCOPY      EXTRACORPOREAL SHOCK WAVE LITHOTRIPSY (ESWL) Left 10/22/2021    Procedure: EXTRACORPOREAL SHOCKWAVE LITHOTRIPSY;  Surgeon: Milan Motley MD;  Location: [facility];  Service: Urology;  Laterality: Left;    LITHOTRIPSY      RIGHT OOPHORECTOMY      URETEROSCOPY LASER LITHOTRIPSY WITH STENT INSERTION Left 10/01/2021    Procedure: URETEROSCOPY WITH STENT PLACEMENT;  Surgeon: Milan Motley MD;  Location: [facility];  Service: Urology;  Laterality: Left;    URETEROSCOPY LASER LITHOTRIPSY WITH STENT INSERTION Left 2022    Procedure: URETEROSCOPY STENT REMOVAL;  Surgeon: Milan Motley MD;  Location: [facility];  Service: Urology;  Laterality: Left;    URETEROSCOPY LASER LITHOTRIPSY WITH STENT INSERTION Left 2022    Procedure: CYSTOSCOPY RETROGRADE LEFT WITH STENT PLACEMENT;  Surgeon: Milan Motley MD;  Location: [facility];  Service: Urology;  Laterality: Left;       Family History   Problem Relation Age of Onset    Diabetes Father     Hypertension Father     Heart attack Father     Hypertension Mother     No Known Problems Paternal Grandmother     No Known Problems Paternal Grandfather     No Known Problems Maternal Grandmother     No Known Problems Maternal Grandfather     No Known Problems Sister     No Known Problems Brother     No Known Problems Daughter     No Known Problems Son     No Known Problems Cousin     Rheum arthritis Neg Hx     Osteoarthritis Neg Hx     Asthma Neg Hx     Heart failure Neg Hx     Hyperlipidemia Neg Hx     Migraines Neg Hx     Rashes / Skin problems Neg Hx     Seizures Neg Hx     Stroke Neg Hx     Thyroid disease Neg Hx        Social History     Socioeconomic History    Marital status:    Tobacco Use    Smoking status: Never     Passive exposure: Never    Smokeless tobacco: Never   Vaping Use    Vaping Use: Never used   Substance and Sexual  "Activity    Alcohol use: No    Drug use: Never     Comment: Pt has track marks on right arm. Pt states \"It's from my INR being drawn.\"     Sexual activity: Not Currently     Partners: Male     Birth control/protection: None           Objective   Physical Exam  Vitals and nursing note reviewed.   Constitutional:       General: She is not in acute distress.     Appearance: She is well-developed. She is not diaphoretic.   HENT:      Head: Normocephalic and atraumatic.      Right Ear: External ear normal.      Left Ear: External ear normal.      Nose: Nose normal.   Eyes:      Conjunctiva/sclera: Conjunctivae normal.   Neck:      Vascular: No JVD.      Trachea: No tracheal deviation.   Cardiovascular:      Rate and Rhythm: Normal rate.      Heart sounds: No murmur heard.  Pulmonary:      Effort: Pulmonary effort is normal. No respiratory distress.      Breath sounds: No wheezing.   Abdominal:      Palpations: Abdomen is soft.      Tenderness: There is no abdominal tenderness. There is right CVA tenderness.   Musculoskeletal:         General: No deformity. Normal range of motion.      Cervical back: Normal range of motion and neck supple.   Skin:     General: Skin is warm and dry.      Coloration: Skin is not pale.      Findings: No erythema or rash.   Neurological:      Mental Status: She is alert and oriented to person, place, and time.      Cranial Nerves: No cranial nerve deficit.   Psychiatric:         Behavior: Behavior normal.         Thought Content: Thought content normal.       Procedures           ED Course  ED Course as of 08/15/23 0516   Tue Aug 15, 2023   0408 CT abd pelvis rad interpreted:  HEPATOSPLENOMEGALY.     MILD RIGHT HYDRONEPHROSIS WITHOUT OBSTRUCTING URETERAL LESION IDENTIFIED  AND A 2 MM CALCULUS IN THE URINARY BLADDER.  FINDINGS SUGGEST RECENTLY  PASSED STONE.   [RB]      ED Course User Index  [RB] Thierry Fisher II, PA                                           Medical Decision " Making  38-year-old female with chronic history of flank pain and UTIs presents to the ER with chief complaint of right flank pain.  Patient was recently seen at [facility].    Problems Addressed:  Acute UTI: acute illness or injury  Renal colic: acute illness or injury     Details: CT imaging does show possible recently passed kidney stone.  Patient responded appropriately to treatment received in the ED outpatient follow-up with her to urologist as recommended.  Nitrite positive urine will be treated with Macrobid.    Amount and/or Complexity of Data Reviewed  Labs: ordered.  Radiology: ordered. Decision-making details documented in ED Course.    Risk  Prescription drug management.        Final diagnoses:   Acute UTI   Renal colic       ED Disposition  ED Disposition       ED Disposition   Discharge    Condition   Stable    Comment   --               Eddie Latif, APRN  [address] [facility] [address] [phone]    Schedule an appointment as soon as possible for a visit            Medication List        New Prescriptions      nitrofurantoin (macrocrystal-monohydrate) 100 MG capsule  Commonly known as: MACROBID  Take 1 capsule by mouth 2 (Two) Times a Day.     tiZANidine 4 MG tablet  Commonly known as: ZANAFLEX  Take 1 tablet by mouth Every 8 (Eight) Hours As Needed (paiin).               Where to Get Your Medications        These medications were sent to [place], [place] - [address] [place] [address] - [phone] [facility] [phone] [address] [address]      Phone: [phone]   nitrofurantoin (macrocrystal-monohydrate) 100 MG capsule  tiZANidine 4 MG tablet            Thierry Fisher II, PA  08/15/23 0516

## 2023-08-16 LAB — BACTERIA SPEC AEROBE CULT: NORMAL

## 2023-08-17 ENCOUNTER — HOSPITAL ENCOUNTER (EMERGENCY)
Facility: HOSPITAL | Age: 38
Discharge: HOME | End: 2023-08-17
Attending: STUDENT IN AN ORGANIZED HEALTH CARE EDUCATION/TRAINING PROGRAM
Payer: COMMERCIAL

## 2023-08-17 ENCOUNTER — APPOINTMENT (OUTPATIENT)
Dept: CT IMAGING | Facility: HOSPITAL | Age: 38
End: 2023-08-17
Payer: COMMERCIAL

## 2023-08-17 VITALS
TEMPERATURE: 98.9 F | WEIGHT: 280 LBS | HEIGHT: 64 IN | SYSTOLIC BLOOD PRESSURE: 130 MMHG | OXYGEN SATURATION: 95 % | HEART RATE: 99 BPM | BODY MASS INDEX: 47.8 KG/M2 | RESPIRATION RATE: 16 BRPM | DIASTOLIC BLOOD PRESSURE: 90 MMHG

## 2023-08-17 DIAGNOSIS — N39.0 UTI (URINARY TRACT INFECTION), UNCOMPLICATED: Primary | ICD-10-CM

## 2023-08-17 LAB
ALBUMIN SERPL-MCNC: 4.1 G/DL (ref 3.5–5.2)
ALBUMIN/GLOB SERPL: 1.1 G/DL
ALP SERPL-CCNC: 107 U/L (ref 39–117)
ALT SERPL W P-5'-P-CCNC: 20 U/L (ref 1–33)
AMYLASE SERPL-CCNC: 34 U/L (ref 28–100)
ANION GAP SERPL CALCULATED.3IONS-SCNC: 17.9 MMOL/L (ref 5–15)
AST SERPL-CCNC: 26 U/L (ref 1–32)
BACTERIA UR QL AUTO: ABNORMAL /HPF
BASOPHILS # BLD AUTO: 0.03 10*3/MM3 (ref 0–0.2)
BASOPHILS NFR BLD AUTO: 0.4 % (ref 0–1.5)
BILIRUB SERPL-MCNC: 0.4 MG/DL (ref 0–1.2)
BILIRUB UR QL STRIP: ABNORMAL
BUN SERPL-MCNC: 16 MG/DL (ref 6–20)
BUN/CREAT SERPL: 21.6 (ref 7–25)
CALCIUM SPEC-SCNC: 9.9 MG/DL (ref 8.6–10.5)
CHLORIDE SERPL-SCNC: 96 MMOL/L (ref 98–107)
CLARITY UR: ABNORMAL
CO2 SERPL-SCNC: 20.1 MMOL/L (ref 22–29)
COLOR UR: ABNORMAL
CREAT SERPL-MCNC: 0.74 MG/DL (ref 0.57–1)
CRP SERPL-MCNC: 1.98 MG/DL (ref 0–0.5)
DEPRECATED RDW RBC AUTO: 44.5 FL (ref 37–54)
EGFRCR SERPLBLD CKD-EPI 2021: 107 ML/MIN/1.73
EOSINOPHIL # BLD AUTO: 0.12 10*3/MM3 (ref 0–0.4)
EOSINOPHIL NFR BLD AUTO: 1.7 % (ref 0.3–6.2)
ERYTHROCYTE [DISTWIDTH] IN BLOOD BY AUTOMATED COUNT: 13.9 % (ref 12.3–15.4)
GLOBULIN UR ELPH-MCNC: 3.7 GM/DL
GLUCOSE SERPL-MCNC: 330 MG/DL (ref 65–99)
GLUCOSE UR STRIP-MCNC: ABNORMAL MG/DL
HCT VFR BLD AUTO: 34.3 % (ref 34–46.6)
HGB BLD-MCNC: 11.2 G/DL (ref 12–15.9)
HGB UR QL STRIP.AUTO: ABNORMAL
HOLD SPECIMEN: NORMAL
HOLD SPECIMEN: NORMAL
HYALINE CASTS UR QL AUTO: ABNORMAL /LPF
IMM GRANULOCYTES # BLD AUTO: 0.04 10*3/MM3 (ref 0–0.05)
IMM GRANULOCYTES NFR BLD AUTO: 0.6 % (ref 0–0.5)
KETONES UR QL STRIP: NEGATIVE
LEUKOCYTE ESTERASE UR QL STRIP.AUTO: ABNORMAL
LIPASE SERPL-CCNC: 41 U/L (ref 13–60)
LYMPHOCYTES # BLD AUTO: 2.02 10*3/MM3 (ref 0.7–3.1)
LYMPHOCYTES NFR BLD AUTO: 29.3 % (ref 19.6–45.3)
MAGNESIUM SERPL-MCNC: 1.8 MG/DL (ref 1.6–2.6)
MCH RBC QN AUTO: 29.6 PG (ref 26.6–33)
MCHC RBC AUTO-ENTMCNC: 32.7 G/DL (ref 31.5–35.7)
MCV RBC AUTO: 90.5 FL (ref 79–97)
MONOCYTES # BLD AUTO: 0.46 10*3/MM3 (ref 0.1–0.9)
MONOCYTES NFR BLD AUTO: 6.7 % (ref 5–12)
NEUTROPHILS NFR BLD AUTO: 4.23 10*3/MM3 (ref 1.7–7)
NEUTROPHILS NFR BLD AUTO: 61.3 % (ref 42.7–76)
NITRITE UR QL STRIP: POSITIVE
NRBC BLD AUTO-RTO: 0 /100 WBC (ref 0–0.2)
PH UR STRIP.AUTO: 5.5 [PH] (ref 5–8)
PLATELET # BLD AUTO: 260 10*3/MM3 (ref 140–450)
PMV BLD AUTO: 8.8 FL (ref 6–12)
POTASSIUM SERPL-SCNC: 4.3 MMOL/L (ref 3.5–5.2)
PROCALCITONIN SERPL-MCNC: 0.29 NG/ML (ref 0–0.25)
PROT SERPL-MCNC: 7.8 G/DL (ref 6–8.5)
PROT UR QL STRIP: ABNORMAL
RBC # BLD AUTO: 3.79 10*6/MM3 (ref 3.77–5.28)
RBC # UR STRIP: ABNORMAL /HPF
REF LAB TEST METHOD: ABNORMAL
SODIUM SERPL-SCNC: 134 MMOL/L (ref 136–145)
SP GR UR STRIP: 1.02 (ref 1–1.03)
SQUAMOUS #/AREA URNS HPF: ABNORMAL /HPF
UROBILINOGEN UR QL STRIP: ABNORMAL
WBC # UR STRIP: ABNORMAL /HPF
WBC NRBC COR # BLD: 6.9 10*3/MM3 (ref 3.4–10.8)
WHOLE BLOOD HOLD COAG: NORMAL
WHOLE BLOOD HOLD SPECIMEN: NORMAL
YEAST URNS QL MICRO: ABNORMAL /HPF

## 2023-08-17 PROCEDURE — 74176 CT ABD & PELVIS W/O CONTRAST: CPT

## 2023-08-17 PROCEDURE — 85025 COMPLETE CBC W/AUTO DIFF WBC: CPT | Performed by: PHYSICIAN ASSISTANT

## 2023-08-17 PROCEDURE — 80053 COMPREHEN METABOLIC PANEL: CPT | Performed by: PHYSICIAN ASSISTANT

## 2023-08-17 PROCEDURE — 82150 ASSAY OF AMYLASE: CPT | Performed by: PHYSICIAN ASSISTANT

## 2023-08-17 PROCEDURE — 25010000002 MORPHINE PER 10 MG: Performed by: STUDENT IN AN ORGANIZED HEALTH CARE EDUCATION/TRAINING PROGRAM

## 2023-08-17 PROCEDURE — 36415 COLL VENOUS BLD VENIPUNCTURE: CPT

## 2023-08-17 PROCEDURE — 86140 C-REACTIVE PROTEIN: CPT | Performed by: PHYSICIAN ASSISTANT

## 2023-08-17 PROCEDURE — 74176 CT ABD & PELVIS W/O CONTRAST: CPT | Performed by: RADIOLOGY

## 2023-08-17 PROCEDURE — P9612 CATHETERIZE FOR URINE SPEC: HCPCS

## 2023-08-17 PROCEDURE — 81001 URINALYSIS AUTO W/SCOPE: CPT | Performed by: PHYSICIAN ASSISTANT

## 2023-08-17 PROCEDURE — 96374 THER/PROPH/DIAG INJ IV PUSH: CPT

## 2023-08-17 PROCEDURE — 83690 ASSAY OF LIPASE: CPT | Performed by: PHYSICIAN ASSISTANT

## 2023-08-17 PROCEDURE — 99284 EMERGENCY DEPT VISIT MOD MDM: CPT

## 2023-08-17 PROCEDURE — 84145 PROCALCITONIN (PCT): CPT | Performed by: PHYSICIAN ASSISTANT

## 2023-08-17 PROCEDURE — 83735 ASSAY OF MAGNESIUM: CPT | Performed by: PHYSICIAN ASSISTANT

## 2023-08-17 PROCEDURE — 25010000002 ONDANSETRON PER 1 MG: Performed by: PHYSICIAN ASSISTANT

## 2023-08-17 PROCEDURE — 96375 TX/PRO/DX INJ NEW DRUG ADDON: CPT

## 2023-08-17 PROCEDURE — 87086 URINE CULTURE/COLONY COUNT: CPT | Performed by: PHYSICIAN ASSISTANT

## 2023-08-17 RX ORDER — ONDANSETRON 2 MG/ML
4 INJECTION INTRAMUSCULAR; INTRAVENOUS ONCE
Status: COMPLETED | OUTPATIENT
Start: 2023-08-17 | End: 2023-08-17

## 2023-08-17 RX ADMIN — SODIUM CHLORIDE 1000 ML: 9 INJECTION, SOLUTION INTRAVENOUS at 12:18

## 2023-08-17 RX ADMIN — MORPHINE SULFATE 4 MG: 4 INJECTION, SOLUTION INTRAMUSCULAR; INTRAVENOUS at 12:18

## 2023-08-17 RX ADMIN — ONDANSETRON 4 MG: 2 INJECTION INTRAMUSCULAR; INTRAVENOUS at 12:18

## 2023-08-17 NOTE — ED NOTES
MEDICAL SCREENING:    Reason for Visit: right flank pain, vomiting and weak for a few days, thinks she may be passing a kidney stone    Patient initially seen in triage.  The patient was advised further evaluation and diagnostic testing will be needed, some of the treatment and testing will be initiated in the lobby in order to begin the process.  The patient will be returned to the waiting area for the time being and possibly be re-assessed by a subsequent ED provider.  The patient will be brought back to the treatment area in as timely manner as possible.      Elisha Tomas PA  08/17/23 0842

## 2023-08-17 NOTE — ED PROVIDER NOTES
Subjective   History of Present Illness  38-year-old female with past medical history of atrial fibrillation, anemia, left-sided nephrectomy in October 2022, anxiety, ovarian cancer, depression, diabetes, factor V Leiden mutation, hyperlipidemia, nephrolithiasis, hypothyroidism, stroke, PCOS, and migraines presents to the emergency room with right-sided flank pain which she states began several days ago.  She states was seen here 2 days ago and diagnosed with a kidney stone and states that she thinks she is trying to pass it.  She still does report nausea and vomiting which she states is worsening.  She states over the past 14 hours her urine has decreased and she thinks the stone may be stuck in her ureter.  Aggravating factors include movement and urination.  Denies any alleviating factors.  Denies any other complaints or concerns at this time.  Patient does state that she follows with Dr. Motley for recurrent kidney stones and has had no issues passing them in the past.    History provided by:  Patient   used: No      Review of Systems   Constitutional: Negative.  Negative for fever.   HENT: Negative.     Respiratory: Negative.     Cardiovascular: Negative.  Negative for chest pain.   Gastrointestinal: Negative.  Negative for abdominal pain.   Endocrine: Negative.    Genitourinary:  Positive for flank pain. Negative for dysuria.   Skin: Negative.    Neurological: Negative.    Psychiatric/Behavioral: Negative.     All other systems reviewed and are negative.    Past Medical History:   Diagnosis Date    A-fib     Abnormal ECG     Anemia     Anxiety     Asthma     Cancer     Ovarian    Depression     Diabetes mellitus     DVT (deep venous thrombosis)     Factor 5 Leiden mutation, heterozygous     Fibroid     GERD (gastroesophageal reflux disease)     Gout     H/O abdominal abscess     History of sepsis     History of transfusion     Hyperlipidemia     Hypothyroid     Kidney stone     Migraines      Neuropathy     Ovarian cancer 2021    Ovarian cyst     PE (pulmonary embolism)     Polycystic ovary syndrome     Preeclampsia     Rh incompatibility     Stroke     TIA (transient ischemic attack)     Urinary tract infection     Varicella        Allergies   Allergen Reactions    Toradol [Ketorolac Tromethamine] Anaphylaxis and Hives    Haldol [Haloperidol] Hives and Mental Status Change    Tramadol Hives and Swelling    Amoxicillin Hives and Rash    Penicillins Hives and Rash       Past Surgical History:   Procedure Laterality Date    ABDOMINAL SURGERY      CARDIAC CATHETERIZATION       SECTION      CHOLECYSTECTOMY      COLONOSCOPY      ENDOSCOPY      EXTRACORPOREAL SHOCK WAVE LITHOTRIPSY (ESWL) Left 10/22/2021    Procedure: EXTRACORPOREAL SHOCKWAVE LITHOTRIPSY;  Surgeon: Milan Motley MD;  Location: [facility];  Service: Urology;  Laterality: Left;    LITHOTRIPSY      RIGHT OOPHORECTOMY      URETEROSCOPY LASER LITHOTRIPSY WITH STENT INSERTION Left 10/01/2021    Procedure: URETEROSCOPY WITH STENT PLACEMENT;  Surgeon: Milan Motley MD;  Location: [facility];  Service: Urology;  Laterality: Left;    URETEROSCOPY LASER LITHOTRIPSY WITH STENT INSERTION Left 2022    Procedure: URETEROSCOPY STENT REMOVAL;  Surgeon: Milan Motley MD;  Location: [facility];  Service: Urology;  Laterality: Left;    URETEROSCOPY LASER LITHOTRIPSY WITH STENT INSERTION Left 2022    Procedure: CYSTOSCOPY RETROGRADE LEFT WITH STENT PLACEMENT;  Surgeon: Milan Motley MD;  Location: [facility];  Service: Urology;  Laterality: Left;       Family History   Problem Relation Age of Onset    Diabetes Father     Hypertension Father     Heart attack Father     Hypertension Mother     No Known Problems Paternal Grandmother     No Known Problems Paternal Grandfather     No Known Problems Maternal Grandmother     No Known Problems Maternal Grandfather     No Known Problems Sister     No Known  "Problems Brother     No Known Problems Daughter     No Known Problems Son     No Known Problems Cousin     Rheum arthritis Neg Hx     Osteoarthritis Neg Hx     Asthma Neg Hx     Heart failure Neg Hx     Hyperlipidemia Neg Hx     Migraines Neg Hx     Rashes / Skin problems Neg Hx     Seizures Neg Hx     Stroke Neg Hx     Thyroid disease Neg Hx        Social History     Socioeconomic History    Marital status:    Tobacco Use    Smoking status: Never     Passive exposure: Never    Smokeless tobacco: Never   Vaping Use    Vaping Use: Never used   Substance and Sexual Activity    Alcohol use: No    Drug use: Never     Comment: Pt has track marks on right arm. Pt states \"It's from my INR being drawn.\"     Sexual activity: Not Currently     Partners: Male     Birth control/protection: None           Objective   Physical Exam  Vitals and nursing note reviewed.   Constitutional:       General: She is not in acute distress.     Appearance: She is well-developed. She is morbidly obese. She is not diaphoretic.   HENT:      Head: Normocephalic and atraumatic.      Right Ear: External ear normal.      Left Ear: External ear normal.      Nose: Nose normal.   Eyes:      Conjunctiva/sclera: Conjunctivae normal.      Pupils: Pupils are equal, round, and reactive to light.   Neck:      Vascular: No JVD.      Trachea: No tracheal deviation.   Cardiovascular:      Rate and Rhythm: Normal rate and regular rhythm.      Heart sounds: Normal heart sounds. No murmur heard.  Pulmonary:      Effort: Pulmonary effort is normal. No respiratory distress.      Breath sounds: Normal breath sounds. No wheezing.   Abdominal:      General: Bowel sounds are normal.      Palpations: Abdomen is soft.      Tenderness: There is no abdominal tenderness in the suprapubic area. There is right CVA tenderness. There is no left CVA tenderness.   Musculoskeletal:         General: No deformity. Normal range of motion.      Cervical back: Normal range of " motion and neck supple.   Skin:     General: Skin is warm and dry.      Coloration: Skin is not pale.      Findings: No erythema or rash.   Neurological:      Mental Status: She is alert and oriented to person, place, and time.      Cranial Nerves: No cranial nerve deficit.   Psychiatric:         Behavior: Behavior normal.         Thought Content: Thought content normal.       Procedures           ED Course  ED Course as of 08/17/23 1314   Thu Aug 17, 2023   1252 CT Abdomen Pelvis Without Contrast [TK]   1303 C-Reactive Protein(!): 1.98  Improved [TK]   1303 Nitrite, UA(!): Positive  Pt currently on Macrobid [TK]      ED Course User Index  [TK] Arjun Alvares, JUMA           Results for orders placed or performed during the hospital encounter of 08/17/23   Comprehensive Metabolic Panel    Specimen: Blood   Result Value Ref Range    Glucose 330 (H) 65 - 99 mg/dL    BUN 16 6 - 20 mg/dL    Creatinine 0.74 0.57 - 1.00 mg/dL    Sodium 134 (L) 136 - 145 mmol/L    Potassium 4.3 3.5 - 5.2 mmol/L    Chloride 96 (L) 98 - 107 mmol/L    CO2 20.1 (L) 22.0 - 29.0 mmol/L    Calcium 9.9 8.6 - 10.5 mg/dL    Total Protein 7.8 6.0 - 8.5 g/dL    Albumin 4.1 3.5 - 5.2 g/dL    ALT (SGPT) 20 1 - 33 U/L    AST (SGOT) 26 1 - 32 U/L    Alkaline Phosphatase 107 39 - 117 U/L    Total Bilirubin 0.4 0.0 - 1.2 mg/dL    Globulin 3.7 gm/dL    A/G Ratio 1.1 g/dL    BUN/Creatinine Ratio 21.6 7.0 - 25.0    Anion Gap 17.9 (H) 5.0 - 15.0 mmol/L    eGFR 107.0 >60.0 mL/min/1.73   Lipase    Specimen: Blood   Result Value Ref Range    Lipase 41 13 - 60 U/L   Amylase    Specimen: Blood   Result Value Ref Range    Amylase 34 28 - 100 U/L   Urinalysis With Culture If Indicated - Straight Cath    Specimen: Straight Cath; Urine   Result Value Ref Range    Color, UA Dark Yellow (A) Yellow, Straw    Appearance, UA Turbid (A) Clear    pH, UA 5.5 5.0 - 8.0    Specific Gravity, UA 1.017 1.005 - 1.030    Glucose,  mg/dL (2+) (A) Negative    Ketones, UA  Negative Negative    Bilirubin, UA Small (1+) (A) Negative    Blood, UA Large (3+) (A) Negative    Protein,  mg/dL (2+) (A) Negative    Leuk Esterase, UA Moderate (2+) (A) Negative    Nitrite, UA Positive (A) Negative    Urobilinogen, UA 1.0 E.U./dL 0.2 - 1.0 E.U./dL   C-reactive Protein    Specimen: Blood   Result Value Ref Range    C-Reactive Protein 1.98 (H) 0.00 - 0.50 mg/dL   Procalcitonin    Specimen: Blood   Result Value Ref Range    Procalcitonin 0.29 (H) 0.00 - 0.25 ng/mL   Magnesium    Specimen: Blood   Result Value Ref Range    Magnesium 1.8 1.6 - 2.6 mg/dL   CBC Auto Differential    Specimen: Blood   Result Value Ref Range    WBC 6.90 3.40 - 10.80 10*3/mm3    RBC 3.79 3.77 - 5.28 10*6/mm3    Hemoglobin 11.2 (L) 12.0 - 15.9 g/dL    Hematocrit 34.3 34.0 - 46.6 %    MCV 90.5 79.0 - 97.0 fL    MCH 29.6 26.6 - 33.0 pg    MCHC 32.7 31.5 - 35.7 g/dL    RDW 13.9 12.3 - 15.4 %    RDW-SD 44.5 37.0 - 54.0 fl    MPV 8.8 6.0 - 12.0 fL    Platelets 260 140 - 450 10*3/mm3    Neutrophil % 61.3 42.7 - 76.0 %    Lymphocyte % 29.3 19.6 - 45.3 %    Monocyte % 6.7 5.0 - 12.0 %    Eosinophil % 1.7 0.3 - 6.2 %    Basophil % 0.4 0.0 - 1.5 %    Immature Grans % 0.6 (H) 0.0 - 0.5 %    Neutrophils, Absolute 4.23 1.70 - 7.00 10*3/mm3    Lymphocytes, Absolute 2.02 0.70 - 3.10 10*3/mm3    Monocytes, Absolute 0.46 0.10 - 0.90 10*3/mm3    Eosinophils, Absolute 0.12 0.00 - 0.40 10*3/mm3    Basophils, Absolute 0.03 0.00 - 0.20 10*3/mm3    Immature Grans, Absolute 0.04 0.00 - 0.05 10*3/mm3    nRBC 0.0 0.0 - 0.2 /100 WBC   Urinalysis, Microscopic Only - Straight Cath    Specimen: Straight Cath; Urine   Result Value Ref Range    RBC, UA 21-30 (A) None Seen, 0-2 /HPF    WBC, UA 21-30 (A) None Seen, 0-2 /HPF    Bacteria, UA Trace (A) None Seen /HPF    Squamous Epithelial Cells, UA 3-6 (A) None Seen, 0-2 /HPF    Yeast, UA Large/3+ Budding Yeast None Seen /HPF    Hyaline Casts, UA 3-6 None Seen /LPF    Methodology Manual Light Microscopy     Green Top (Gel)   Result Value Ref Range    Extra Tube Hold for add-ons.    Lavender Top   Result Value Ref Range    Extra Tube hold for add-on    Gold Top - SST   Result Value Ref Range    Extra Tube Hold for add-ons.    Light Blue Top   Result Value Ref Range    Extra Tube Hold for add-ons.        CT Abdomen Pelvis Without Contrast   Final Result   1.  No right hydronephrosis or obstructive urolithiasis identified.   2.  Fatty infiltration of the liver with associated hepatomegaly.       This report was finalized on 8/17/2023 9:06 AM by Dr. Pj Lee MD.                                              Medical Decision Making  38-year-old female with past medical history of atrial fibrillation, anemia, left-sided nephrectomy in October 2022, anxiety, ovarian cancer, depression, diabetes, factor V Leiden mutation, hyperlipidemia, nephrolithiasis, hypothyroidism, stroke, PCOS, and migraines presents to the emergency room with right-sided flank pain which she states began several days ago.  She states was seen here 2 days ago and diagnosed with a kidney stone and states that she thinks she is trying to pass it.  She still does report nausea and vomiting which she states is worsening.  She states over the past 14 hours her urine has decreased and she thinks the stone may be stuck in her ureter.  Aggravating factors include movement and urination.  Denies any alleviating factors.  Denies any other complaints or concerns at this time.  Patient does state that she follows with Dr. Motley for recurrent kidney stones and has had no issues passing them in the past.    Uncomplicated ED course.  Patient instructed to continue her Macrobid which was prescribed 2 days ago.  I did review her urine culture from 2 days ago and it had less than 25,000 CFU's and was normal urinary tract hafsa with colonization within the urinary tract.  There was no kidney stone found on today's CT abdomen and pelvis, therefore patient was  encouraged to follow-up with her urologist.    Problems Addressed:  UTI (urinary tract infection), uncomplicated: complicated acute illness or injury    Amount and/or Complexity of Data Reviewed  Labs: ordered. Decision-making details documented in ED Course.  Radiology: ordered. Decision-making details documented in ED Course.    Risk  Prescription drug management.        Final diagnoses:   UTI (urinary tract infection), uncomplicated       ED Disposition  ED Disposition       ED Disposition   Discharge    Condition   Stable    Comment   --               Eddie Latif, APRN  [address] [facility] [id number] [phone]    In 2 days      Milan Motley MD  [address] [facility] [address]  [facility] [address]  [phone]    In 2 days           Medication List      No changes were made to your prescriptions during this visit.            Arjun Alvares PAJerilynC  08/17/23 1316

## 2023-08-17 NOTE — ED NOTES
Pt provided urine collection supplies and advised the need for sample. Pt verbalized understanding. Pt unable to provide at this time, and says she hasn't been able to for a while. Provider made aware

## 2023-08-18 LAB — BACTERIA SPEC AEROBE CULT: ABNORMAL

## 2023-08-23 DIAGNOSIS — E11.65 TYPE 2 DIABETES MELLITUS WITH HYPERGLYCEMIA, WITH LONG-TERM CURRENT USE OF INSULIN: Primary | ICD-10-CM

## 2023-08-23 DIAGNOSIS — Z79.4 TYPE 2 DIABETES MELLITUS WITH HYPERGLYCEMIA, WITH LONG-TERM CURRENT USE OF INSULIN: Primary | ICD-10-CM

## 2023-08-23 DIAGNOSIS — Z79.4 TYPE 2 DIABETES MELLITUS WITH DIABETIC NEUROPATHY, WITH LONG-TERM CURRENT USE OF INSULIN: Chronic | ICD-10-CM

## 2023-08-23 DIAGNOSIS — I10 PRIMARY HYPERTENSION: Chronic | ICD-10-CM

## 2023-08-23 DIAGNOSIS — E55.9 VITAMIN D DEFICIENCY: ICD-10-CM

## 2023-08-23 DIAGNOSIS — E11.40 TYPE 2 DIABETES MELLITUS WITH DIABETIC NEUROPATHY, WITH LONG-TERM CURRENT USE OF INSULIN: Chronic | ICD-10-CM

## 2023-08-23 RX ORDER — ERGOCALCIFEROL 1.25 MG/1
50000 CAPSULE ORAL WEEKLY
Qty: 5 CAPSULE | Refills: 0 | Status: SHIPPED | OUTPATIENT
Start: 2023-08-23

## 2023-08-23 RX ORDER — INSULIN LISPRO 100 [IU]/ML
INJECTION, SOLUTION INTRAVENOUS; SUBCUTANEOUS
Qty: 15 ML | Refills: 0 | Status: SHIPPED | OUTPATIENT
Start: 2023-08-23

## 2023-08-23 RX ORDER — GABAPENTIN 300 MG/1
300 CAPSULE ORAL NIGHTLY PRN
Qty: 30 CAPSULE | Refills: 0 | Status: SHIPPED | OUTPATIENT
Start: 2023-08-23 | End: 2023-09-22

## 2023-08-23 RX ORDER — SEMAGLUTIDE 0.68 MG/ML
0.25 INJECTION, SOLUTION SUBCUTANEOUS WEEKLY
Qty: 3 ML | Refills: 0 | Status: SHIPPED | OUTPATIENT
Start: 2023-08-23 | End: 2023-08-27

## 2023-08-23 RX ORDER — METOPROLOL SUCCINATE 50 MG/1
50 TABLET, EXTENDED RELEASE ORAL DAILY
Qty: 30 TABLET | Refills: 0 | Status: SHIPPED | OUTPATIENT
Start: 2023-08-23

## 2023-08-23 NOTE — TELEPHONE ENCOUNTER
Caller: Natalia Arzate    Relationship: Self    Best call back number: [phone]    Requested Prescriptions:   Requested Prescriptions     Pending Prescriptions Disp Refills    vitamin D (ERGOCALCIFEROL) 1.25 MG (85045 UT) capsule capsule 5 capsule 3     Sig: Take 1 capsule by mouth 1 (One) Time Per Week. Prior to [facility] Admission, Patient was on:  takes on saturday    metoprolol succinate XL (TOPROL-XL) 50 MG 24 hr tablet 30 tablet 0     Sig: Take 1 tablet by mouth Daily.    gabapentin (NEURONTIN) 300 MG capsule 30 capsule 0     Sig: Take 1 capsule by mouth At Night As Needed (neuropathy) for up to 30 days.    Insulin Lispro, 1 Unit Dial, (HUMALOG) 100 UNIT/ML solution pen-injector 15 mL 0     Sig: Per sliding scale        Pharmacy where request should be sent: [address] [phone] [facility] [phone] FX     Last office visit with prescribing clinician: 7/27/2023   Last telemedicine visit with prescribing clinician: Visit date not found   Next office visit with prescribing clinician: Visit date not found     Additional details provided by patient: PATIENT ALSO ASKING FOR OZEMPIC    Does the patient have less than a 3 day supply:  [x] Yes  [] No    Juan Atkins Rep   08/23/23 12:46 EDT

## 2023-08-25 ENCOUNTER — TELEPHONE (OUTPATIENT)
Dept: FAMILY MEDICINE CLINIC | Facility: CLINIC | Age: 38
End: 2023-08-25

## 2023-08-25 NOTE — TELEPHONE ENCOUNTER
Caller: Natalia Arzate    Relationship: Self    Best call back number:       [phone] (Mobile)     Which medication are you concerned about:     Semaglutide,0.25 or 0.5MG/DOS, (Ozempic, 0.25 or 0.5 MG/DOSE,) 2 MG/3ML solution pen-injector     Who prescribed you this medication:     PATRICE KUNZ    What are your concerns:     PATIENT STATED SHE PICKED THE MEDICATION UP FROM THE PHARMACY AND TOOK IT BEFORE REALIZING THE DOSAGE WAS INCORRECT    PATIENT STATED SHE HAS BEEN ON (2) MG FOR A PERIOD OF TIME     PATIENT REQUESTED A CALL BACK OR VIA MESSAGE THROUGH HER [facility] ACCOUNT WITH ANY UPDATES WHY THE DOSAGE WAS DIFFERENT FROM THE PHARMACY THIS TIME

## 2023-08-27 DIAGNOSIS — Z79.4 TYPE 2 DIABETES MELLITUS WITH HYPERGLYCEMIA, WITH LONG-TERM CURRENT USE OF INSULIN: Primary | ICD-10-CM

## 2023-08-27 DIAGNOSIS — E11.65 TYPE 2 DIABETES MELLITUS WITH HYPERGLYCEMIA, WITH LONG-TERM CURRENT USE OF INSULIN: Primary | ICD-10-CM

## 2023-08-28 ENCOUNTER — PRIOR AUTHORIZATION (OUTPATIENT)
Dept: FAMILY MEDICINE CLINIC | Facility: CLINIC | Age: 38
End: 2023-08-28
Payer: COMMERCIAL

## 2023-09-10 ENCOUNTER — APPOINTMENT (OUTPATIENT)
Dept: ULTRASOUND IMAGING | Facility: HOSPITAL | Age: 38
End: 2023-09-10
Payer: COMMERCIAL

## 2023-09-10 ENCOUNTER — HOSPITAL ENCOUNTER (EMERGENCY)
Facility: HOSPITAL | Age: 38
Discharge: HOME | End: 2023-09-10
Attending: STUDENT IN AN ORGANIZED HEALTH CARE EDUCATION/TRAINING PROGRAM | Admitting: STUDENT IN AN ORGANIZED HEALTH CARE EDUCATION/TRAINING PROGRAM
Payer: COMMERCIAL

## 2023-09-10 VITALS
OXYGEN SATURATION: 96 % | TEMPERATURE: 98.6 F | BODY MASS INDEX: 48.65 KG/M2 | DIASTOLIC BLOOD PRESSURE: 87 MMHG | HEART RATE: 109 BPM | SYSTOLIC BLOOD PRESSURE: 119 MMHG | HEIGHT: 64 IN | WEIGHT: 285 LBS | RESPIRATION RATE: 18 BRPM

## 2023-09-10 DIAGNOSIS — R10.9 FLANK PAIN: Primary | ICD-10-CM

## 2023-09-10 LAB
ALBUMIN SERPL-MCNC: 4.4 G/DL (ref 3.5–5.2)
ALBUMIN/GLOB SERPL: 1.3 G/DL
ALP SERPL-CCNC: 101 U/L (ref 39–117)
ALT SERPL W P-5'-P-CCNC: 25 U/L (ref 1–33)
ANION GAP SERPL CALCULATED.3IONS-SCNC: 14.9 MMOL/L (ref 5–15)
AST SERPL-CCNC: 31 U/L (ref 1–32)
BACTERIA UR QL AUTO: ABNORMAL /HPF
BASOPHILS # BLD AUTO: 0.04 10*3/MM3 (ref 0–0.2)
BASOPHILS NFR BLD AUTO: 0.7 % (ref 0–1.5)
BILIRUB SERPL-MCNC: 0.3 MG/DL (ref 0–1.2)
BILIRUB UR QL STRIP: NEGATIVE
BUN SERPL-MCNC: 21 MG/DL (ref 6–20)
BUN/CREAT SERPL: 28 (ref 7–25)
CALCIUM SPEC-SCNC: 10.2 MG/DL (ref 8.6–10.5)
CHLORIDE SERPL-SCNC: 98 MMOL/L (ref 98–107)
CLARITY UR: CLEAR
CO2 SERPL-SCNC: 19.1 MMOL/L (ref 22–29)
COLOR UR: YELLOW
CREAT SERPL-MCNC: 0.75 MG/DL (ref 0.57–1)
DEPRECATED RDW RBC AUTO: 46.5 FL (ref 37–54)
EGFRCR SERPLBLD CKD-EPI 2021: 105.3 ML/MIN/1.73
EOSINOPHIL # BLD AUTO: 0.12 10*3/MM3 (ref 0–0.4)
EOSINOPHIL NFR BLD AUTO: 2 % (ref 0.3–6.2)
ERYTHROCYTE [DISTWIDTH] IN BLOOD BY AUTOMATED COUNT: 14.6 % (ref 12.3–15.4)
GLOBULIN UR ELPH-MCNC: 3.5 GM/DL
GLUCOSE SERPL-MCNC: 437 MG/DL (ref 65–99)
GLUCOSE UR STRIP-MCNC: ABNORMAL MG/DL
HCT VFR BLD AUTO: 36.8 % (ref 34–46.6)
HGB BLD-MCNC: 12.1 G/DL (ref 12–15.9)
HGB UR QL STRIP.AUTO: ABNORMAL
HOLD SPECIMEN: NORMAL
HOLD SPECIMEN: NORMAL
HYALINE CASTS UR QL AUTO: ABNORMAL /LPF
IMM GRANULOCYTES # BLD AUTO: 0.03 10*3/MM3 (ref 0–0.05)
IMM GRANULOCYTES NFR BLD AUTO: 0.5 % (ref 0–0.5)
KETONES UR QL STRIP: ABNORMAL
LEUKOCYTE ESTERASE UR QL STRIP.AUTO: NEGATIVE
LYMPHOCYTES # BLD AUTO: 1.36 10*3/MM3 (ref 0.7–3.1)
LYMPHOCYTES NFR BLD AUTO: 22.4 % (ref 19.6–45.3)
MCH RBC QN AUTO: 29.6 PG (ref 26.6–33)
MCHC RBC AUTO-ENTMCNC: 32.9 G/DL (ref 31.5–35.7)
MCV RBC AUTO: 90 FL (ref 79–97)
MONOCYTES # BLD AUTO: 0.43 10*3/MM3 (ref 0.1–0.9)
MONOCYTES NFR BLD AUTO: 7.1 % (ref 5–12)
NEUTROPHILS NFR BLD AUTO: 4.1 10*3/MM3 (ref 1.7–7)
NEUTROPHILS NFR BLD AUTO: 67.3 % (ref 42.7–76)
NITRITE UR QL STRIP: NEGATIVE
NRBC BLD AUTO-RTO: 0 /100 WBC (ref 0–0.2)
PH UR STRIP.AUTO: 5.5 [PH] (ref 5–8)
PLATELET # BLD AUTO: 208 10*3/MM3 (ref 140–450)
PMV BLD AUTO: 9.3 FL (ref 6–12)
POTASSIUM SERPL-SCNC: 4.7 MMOL/L (ref 3.5–5.2)
PROT SERPL-MCNC: 7.9 G/DL (ref 6–8.5)
PROT UR QL STRIP: ABNORMAL
RBC # BLD AUTO: 4.09 10*6/MM3 (ref 3.77–5.28)
RBC # UR STRIP: ABNORMAL /HPF
REF LAB TEST METHOD: ABNORMAL
SODIUM SERPL-SCNC: 132 MMOL/L (ref 136–145)
SP GR UR STRIP: 1.03 (ref 1–1.03)
SQUAMOUS #/AREA URNS HPF: ABNORMAL /HPF
UROBILINOGEN UR QL STRIP: ABNORMAL
WBC # UR STRIP: ABNORMAL /HPF
WBC NRBC COR # BLD: 6.08 10*3/MM3 (ref 3.4–10.8)
WHOLE BLOOD HOLD COAG: NORMAL
WHOLE BLOOD HOLD SPECIMEN: NORMAL

## 2023-09-10 PROCEDURE — 85025 COMPLETE CBC W/AUTO DIFF WBC: CPT | Performed by: NURSE PRACTITIONER

## 2023-09-10 PROCEDURE — 81001 URINALYSIS AUTO W/SCOPE: CPT | Performed by: NURSE PRACTITIONER

## 2023-09-10 PROCEDURE — 36415 COLL VENOUS BLD VENIPUNCTURE: CPT

## 2023-09-10 PROCEDURE — 99284 EMERGENCY DEPT VISIT MOD MDM: CPT

## 2023-09-10 PROCEDURE — 63710000001 INSULIN REGULAR HUMAN PER 5 UNITS: Performed by: NURSE PRACTITIONER

## 2023-09-10 PROCEDURE — 80053 COMPREHEN METABOLIC PANEL: CPT | Performed by: NURSE PRACTITIONER

## 2023-09-10 PROCEDURE — 76856 US EXAM PELVIC COMPLETE: CPT

## 2023-09-10 RX ORDER — OXYCODONE HYDROCHLORIDE AND ACETAMINOPHEN 5; 325 MG/1; MG/1
1 TABLET ORAL ONCE
Status: COMPLETED | OUTPATIENT
Start: 2023-09-10 | End: 2023-09-10

## 2023-09-10 RX ADMIN — OXYCODONE HYDROCHLORIDE AND ACETAMINOPHEN 1 TABLET: 5; 325 TABLET ORAL at 16:13

## 2023-09-10 RX ADMIN — INSULIN HUMAN 8 UNITS: 100 INJECTION, SOLUTION PARENTERAL at 14:05

## 2023-09-10 RX ADMIN — OXYCODONE HYDROCHLORIDE AND ACETAMINOPHEN 1 TABLET: 5; 325 TABLET ORAL at 12:34

## 2023-09-10 NOTE — ED PROVIDER NOTES
Subjective   History of Present Illness  Patient is a 38-year-old female presents to the emergency room  today complaining of left lower abdominal pain and right flank pain.  Patient has been here numerous times for similar complaints.  Patient reports that pain is moderate to severe.  Patient denies relieving or worsening factors.  Patient reports she fears that she may have a cyst Dithrocream.    Abdominal Pain    Review of Systems   Constitutional: Negative.    HENT: Negative.     Eyes: Negative.    Respiratory: Negative.     Cardiovascular: Negative.    Gastrointestinal: Negative.  Positive for abdominal pain.   Endocrine: Negative.    Genitourinary: Negative.    Musculoskeletal: Negative.    Skin: Negative.    Allergic/Immunologic: Negative.    Neurological: Negative.    Hematological: Negative.    Psychiatric/Behavioral: Negative.       Past Medical History:   Diagnosis Date    A-fib     Abnormal ECG     Anemia     Anxiety     Asthma     Cancer     Ovarian    Depression     Diabetes mellitus     DVT (deep venous thrombosis)     Factor 5 Leiden mutation, heterozygous     Fibroid     GERD (gastroesophageal reflux disease)     Gout     H/O abdominal abscess     History of sepsis     History of transfusion     Hyperlipidemia     Hypothyroid     Kidney stone     Migraines     Neuropathy     Ovarian cancer 2021    Ovarian cyst     PE (pulmonary embolism)     Polycystic ovary syndrome     Preeclampsia     Rh incompatibility     Stroke     TIA (transient ischemic attack)     Urinary tract infection     Varicella        Allergies   Allergen Reactions    Toradol [Ketorolac Tromethamine] Anaphylaxis and Hives    Haldol [Haloperidol] Hives and Mental Status Change    Tramadol Hives and Swelling    Amoxicillin Hives and Rash    Penicillins Hives and Rash       Past Surgical History:   Procedure Laterality Date    ABDOMINAL SURGERY      CARDIAC CATHETERIZATION       SECTION      CHOLECYSTECTOMY      COLONOSCOPY       ENDOSCOPY      EXTRACORPOREAL SHOCK WAVE LITHOTRIPSY (ESWL) Left 10/22/2021    Procedure: EXTRACORPOREAL SHOCKWAVE LITHOTRIPSY;  Surgeon: Milan Motley MD;  Location: [facility];  Service: Urology;  Laterality: Left;    LITHOTRIPSY      RIGHT OOPHORECTOMY      URETEROSCOPY LASER LITHOTRIPSY WITH STENT INSERTION Left 10/01/2021    Procedure: URETEROSCOPY WITH STENT PLACEMENT;  Surgeon: Milan Motley MD;  Location: [facility];  Service: Urology;  Laterality: Left;    URETEROSCOPY LASER LITHOTRIPSY WITH STENT INSERTION Left 4/27/2022    Procedure: URETEROSCOPY STENT REMOVAL;  Surgeon: Milan Motley MD;  Location: [facility];  Service: Urology;  Laterality: Left;    URETEROSCOPY LASER LITHOTRIPSY WITH STENT INSERTION Left 6/29/2022    Procedure: CYSTOSCOPY RETROGRADE LEFT WITH STENT PLACEMENT;  Surgeon: Milan Motley MD;  Location: [facility];  Service: Urology;  Laterality: Left;       Family History   Problem Relation Age of Onset    Diabetes Father     Hypertension Father     Heart attack Father     Hypertension Mother     No Known Problems Paternal Grandmother     No Known Problems Paternal Grandfather     No Known Problems Maternal Grandmother     No Known Problems Maternal Grandfather     No Known Problems Sister     No Known Problems Brother     No Known Problems Daughter     No Known Problems Son     No Known Problems Cousin     Rheum arthritis Neg Hx     Osteoarthritis Neg Hx     Asthma Neg Hx     Heart failure Neg Hx     Hyperlipidemia Neg Hx     Migraines Neg Hx     Rashes / Skin problems Neg Hx     Seizures Neg Hx     Stroke Neg Hx     Thyroid disease Neg Hx        Social History     Socioeconomic History    Marital status:    Tobacco Use    Smoking status: Never     Passive exposure: Never    Smokeless tobacco: Never   Vaping Use    Vaping Use: Never used   Substance and Sexual Activity    Alcohol use: No    Drug use: Never     Comment: Pt has track marks on  "right arm. Pt states \"It's from my INR being drawn.\"     Sexual activity: Not Currently     Partners: Male     Birth control/protection: None           Objective   Physical Exam  Vitals and nursing note reviewed.   Constitutional:       Appearance: She is well-developed.   HENT:      Head: Normocephalic.      Right Ear: External ear normal.      Left Ear: External ear normal.   Eyes:      Conjunctiva/sclera: Conjunctivae normal.      Pupils: Pupils are equal, round, and reactive to light.   Cardiovascular:      Rate and Rhythm: Normal rate and regular rhythm.      Heart sounds: Normal heart sounds.   Pulmonary:      Effort: Pulmonary effort is normal.      Breath sounds: Normal breath sounds.   Abdominal:      General: Bowel sounds are normal.      Palpations: Abdomen is soft.      Tenderness: There is abdominal tenderness in the suprapubic area.   Musculoskeletal:         General: Normal range of motion.      Cervical back: Normal range of motion and neck supple.   Skin:     General: Skin is warm and dry.      Capillary Refill: Capillary refill takes less than 2 seconds.   Neurological:      Mental Status: She is alert and oriented to person, place, and time.   Psychiatric:         Behavior: Behavior normal.         Thought Content: Thought content normal.       Procedures           ED Course                                           Medical Decision Making  Problems Addressed:  Flank pain: complicated acute illness or injury    Amount and/or Complexity of Data Reviewed  Labs: ordered.  Radiology: ordered.    Risk  OTC drugs.  Prescription drug management.        Final diagnoses:   Flank pain       ED Disposition  ED Disposition       ED Disposition   Discharge    Condition   Stable    Comment   --               Eddie Latif, APRN  [address] [facility] [address]  [phone]    Schedule an appointment as soon as possible for a visit   For further evaluation         Medication List      No changes were " made to your prescriptions during this visit.            Charly Fisher, APRN  09/13/23 1013

## 2023-09-29 ENCOUNTER — READMISSION MANAGEMENT (OUTPATIENT)
Dept: CALL CENTER | Facility: HOSPITAL | Age: 38
End: 2023-09-29
Payer: COMMERCIAL

## 2023-09-29 ENCOUNTER — TELEPHONE (OUTPATIENT)
Dept: FAMILY MEDICINE CLINIC | Facility: CLINIC | Age: 38
End: 2023-09-29

## 2023-09-29 DIAGNOSIS — Z79.4 TYPE 2 DIABETES MELLITUS WITH DIABETIC NEUROPATHY, WITH LONG-TERM CURRENT USE OF INSULIN: Chronic | ICD-10-CM

## 2023-09-29 DIAGNOSIS — E11.40 TYPE 2 DIABETES MELLITUS WITH DIABETIC NEUROPATHY, WITH LONG-TERM CURRENT USE OF INSULIN: Chronic | ICD-10-CM

## 2023-09-29 NOTE — OUTREACH NOTE
Prep Survey      Flowsheet Row Responses   Nondenominational facility patient discharged from? Non-BH   Is LACE score < 7 ? Non-BH Discharge   Eligibility [facility]   Date of Admission 09/17/23   Date of Discharge 09/29/23   Discharge Disposition Home or Self Care   Discharge diagnosis Sepsis   Does the patient have one of the following disease processes/diagnoses(primary or secondary)? Sepsis   Prep survey completed? Yes            Marisol KOLB - Registered Nurse

## 2023-09-29 NOTE — TELEPHONE ENCOUNTER
[facility]    Relationship to patient:     Best call back number: [phone]     New or established patient?  [] New  [x] Established    Date of discharge: 09/29/2023    Facility discharged from: [facility]    Diagnosis/Symptoms: SEPSIS    Length of stay (If applicable): 09/17/23-09/29/23    Specialty Only: Did you see a [facility] health provider?    [] Yes  [] No  If so, who?

## 2023-09-29 NOTE — TELEPHONE ENCOUNTER
Caller: Natalia Arzate    Relationship: Self    Best call back number: [phone]     Requested Prescriptions:   Requested Prescriptions     Pending Prescriptions Disp Refills    gabapentin (NEURONTIN) 300 MG capsule 30 capsule 0     Sig: Take 1 capsule by mouth At Night As Needed (neuropathy) for up to 30 days.        Pharmacy where request should be sent: [address] [phone] [facility] [phone]      Last office visit with prescribing clinician: 7/27/2023   Last telemedicine visit with prescribing clinician: Visit date not found   Next office visit with prescribing clinician: 10/6/2023     Additional details provided by patient: PATIENT IS OUT OF MEDICATION    Does the patient have less than a 3 day supply:  [x] Yes  [] No    Would you like a call back once the refill request has been completed: [x] Yes [] No    If the office needs to give you a call back, can they leave a voicemail: [x] Yes [] No    Juan Roberto Rep   09/29/23 12:08 EDT

## 2023-10-02 ENCOUNTER — TRANSITIONAL CARE MANAGEMENT TELEPHONE ENCOUNTER (OUTPATIENT)
Dept: CALL CENTER | Facility: HOSPITAL | Age: 38
End: 2023-10-02
Payer: COMMERCIAL

## 2023-10-02 RX ORDER — GABAPENTIN 300 MG/1
300 CAPSULE ORAL NIGHTLY PRN
Qty: 30 CAPSULE | Refills: 0 | Status: SHIPPED | OUTPATIENT
Start: 2023-10-02 | End: 2023-11-01

## 2023-10-02 NOTE — OUTREACH NOTE
"Call Center TCM Note      Flowsheet Row Responses   [facility] patient discharged from? Non-  [[facility]]   Does the patient have one of the following disease processes/diagnoses(primary or secondary)? Other   TCM attempt successful? Yes  [No updated verbal release for PCP]   Call start time 1442   Call end time 1445   Discharge diagnosis Sepsis   Meds reviewed with patient/caregiver? Yes   Is the patient having any side effects they believe may be caused by any medication additions or changes? No   Does the patient have all medications ordered at discharge? Yes   Is the patient taking all medications as directed (includes completed medication regime)? Yes   Medication comments discharged with robaxin, norco and bowel regimen   Comments Hospital f/u on 10/6/23@300pm   Does the patient have an appointment with their PCP within 7-14 days of discharge? Yes   Has home health visited the patient within 72 hours of discharge? N/A   Psychosocial issues? No   Did the patient receive a copy of their discharge instructions? Yes   Nursing interventions Reviewed instructions with patient   What is the patient's perception of their health status since discharge? Improving  [Feels a \"little better\" but still has weakness.  Report she had dx sepis--never developed a fever but urine was foul, bloody and brown with difficulty with urination as well as dehydration. Encouraged fluids and drinking as she can. ]   Is the patient/caregiver able to teach back signs and symptoms related to disease process for when to call PCP? Yes   Is the patient/caregiver able to teach back signs and symptoms related to disease process for when to call 911? Yes   Additional teach back comments Reviewed s/s sepsis with pt   TCM call completed? Yes   Call end time 1445            Chuyita Pedersen RN    10/2/2023, 14:50 EDT        "

## 2023-10-06 ENCOUNTER — OFFICE VISIT (OUTPATIENT)
Dept: FAMILY MEDICINE CLINIC | Facility: CLINIC | Age: 38
End: 2023-10-06
Payer: COMMERCIAL

## 2023-10-06 DIAGNOSIS — Z23 ENCOUNTER FOR IMMUNIZATION: ICD-10-CM

## 2023-10-06 DIAGNOSIS — F33.0 MAJOR DEPRESSIVE DISORDER, RECURRENT EPISODE, MILD DEGREE: Chronic | ICD-10-CM

## 2023-10-06 DIAGNOSIS — H92.03 OTALGIA OF BOTH EARS: ICD-10-CM

## 2023-10-06 DIAGNOSIS — I10 PRIMARY HYPERTENSION: Chronic | ICD-10-CM

## 2023-10-06 DIAGNOSIS — D68.51 FACTOR 5 LEIDEN MUTATION, HETEROZYGOUS: Chronic | ICD-10-CM

## 2023-10-06 DIAGNOSIS — Z09 HOSPITAL DISCHARGE FOLLOW-UP: Primary | ICD-10-CM

## 2023-10-06 DIAGNOSIS — E78.5 DYSLIPIDEMIA: Chronic | ICD-10-CM

## 2023-10-06 DIAGNOSIS — E11.65 TYPE 2 DIABETES MELLITUS WITH HYPERGLYCEMIA, WITH LONG-TERM CURRENT USE OF INSULIN: Chronic | ICD-10-CM

## 2023-10-06 DIAGNOSIS — G43.809 OTHER MIGRAINE WITHOUT STATUS MIGRAINOSUS, NOT INTRACTABLE: Chronic | ICD-10-CM

## 2023-10-06 DIAGNOSIS — E53.8 VITAMIN B 12 DEFICIENCY: ICD-10-CM

## 2023-10-06 DIAGNOSIS — R05.9 COUGH, UNSPECIFIED TYPE: Chronic | ICD-10-CM

## 2023-10-06 DIAGNOSIS — Z79.4 TYPE 2 DIABETES MELLITUS WITH HYPERGLYCEMIA, WITH LONG-TERM CURRENT USE OF INSULIN: Chronic | ICD-10-CM

## 2023-10-06 RX ORDER — DEXTROMETHORPHAN HYDROBROMIDE AND PROMETHAZINE HYDROCHLORIDE 15; 6.25 MG/5ML; MG/5ML
5 SYRUP ORAL 4 TIMES DAILY PRN
Qty: 180 ML | Refills: 0 | Status: SHIPPED | OUTPATIENT
Start: 2023-10-06

## 2023-10-06 RX ORDER — TOPIRAMATE 25 MG/1
25 TABLET ORAL DAILY
Qty: 14 TABLET | Refills: 0 | Status: SHIPPED | OUTPATIENT
Start: 2023-10-06

## 2023-10-06 RX ORDER — INSULIN LISPRO 100 [IU]/ML
INJECTION, SOLUTION INTRAVENOUS; SUBCUTANEOUS
Qty: 15 ML | Refills: 0 | Status: SHIPPED | OUTPATIENT
Start: 2023-10-06

## 2023-10-06 RX ORDER — FLUCONAZOLE 150 MG/1
150 TABLET ORAL DAILY
Qty: 3 TABLET | Refills: 0 | Status: SHIPPED | OUTPATIENT
Start: 2023-10-06 | End: 2023-10-09

## 2023-10-06 RX ORDER — INSULIN GLARGINE 100 [IU]/ML
INJECTION, SOLUTION SUBCUTANEOUS
Qty: 24 ML | Refills: 3 | Status: SHIPPED | OUTPATIENT
Start: 2023-10-06

## 2023-10-06 RX ORDER — METOPROLOL SUCCINATE 50 MG/1
50 TABLET, EXTENDED RELEASE ORAL DAILY
Qty: 30 TABLET | Refills: 0 | Status: SHIPPED | OUTPATIENT
Start: 2023-10-06

## 2023-10-06 RX ORDER — DULOXETIN HYDROCHLORIDE 30 MG/1
30 CAPSULE, DELAYED RELEASE ORAL 2 TIMES DAILY
Qty: 60 CAPSULE | Refills: 2 | Status: SHIPPED | OUTPATIENT
Start: 2023-10-06

## 2023-10-06 RX ORDER — CIPROFLOXACIN AND DEXAMETHASONE 3; 1 MG/ML; MG/ML
4 SUSPENSION/ DROPS AURICULAR (OTIC) 2 TIMES DAILY
Qty: 7.5 ML | Refills: 0 | Status: SHIPPED | OUTPATIENT
Start: 2023-10-06

## 2023-10-06 RX ORDER — BENZONATATE 200 MG/1
200 CAPSULE ORAL 3 TIMES DAILY PRN
Qty: 20 CAPSULE | Refills: 1 | Status: SHIPPED | OUTPATIENT
Start: 2023-10-06

## 2023-10-06 NOTE — PROGRESS NOTES
History of Present Illness  Natalia Arzate is a 38 y.o. female who presents to the clinic for follow-up.  Natalia had a recent hospitalization from September 17, 2023 until discharge on September 29 , 2023 at [facility] for sepsis.  She presented to [facility] emergency department on September 17, 2023 complaining of right upper flank pain with left lower abdominal pain.  She reported having severe pain which had started the day prior stabbing in nature radiating towards the groin on.  Associated symptoms included vomiting, nausea, and a hematuria, dysuria.  She was admitted for treatment.  Procedures prior to her discharge included an EGD and colonoscopy.  Her discharge summary and hospitalization has been reviewed.  Natalia has been diagnosed with DM, type 2 with neuropathy currently treated with insulin, HTN, Dyslipidemia, Hypothyroidism, Renal Stones, Gout, and Factor V Leiden.  During her lengthy hospitalization in December 2020 through early 2021, she suffered a CVA and also had to be placed on mechanical ventilation.    Diabetes  She has type 2 diabetes mellitus treated with insulin both basal and fast acting.OmniPod has been prescribed which was denied by her insurance.  She is using a DexCom which is helping her.  Risk factors for coronary artery disease include diabetes mellitus, dyslipidemia, hypertension, obesity, stress and sedentary lifestyle.  Her recent A1c while hospitalized in September was 9.2 which is a significant increase from her previous.    Lab Results   Component Value Date    HGBA1C 9 12/01/2022     Lab Results   Component Value Date    HGBA1C 8.9 12/01/2022     Lab Results   Component Value Date    HGBA1C 7.00 (H) 03/28/2023     Lab Results   Component Value Date    HGBA1C 7.3 (H) 06/30/2023      Hypertension  Currently taking Amlodipine and Metoprolol.      Lab Results   Component Value Date    GLUCOSE 312 (H) 07/26/2023    BUN 17 07/26/2023    CREATININE 0.73  07/26/2023    EGFR 108.8 07/26/2023    BCR 23.3 07/26/2023    K 4.9 07/26/2023    CO2 20.0 (L) 07/26/2023    CALCIUM 10.1 07/26/2023    ALBUMIN 4.4 07/26/2023    BILITOT 0.4 07/26/2023    AST 31 07/26/2023    ALT 21 07/26/2023      Dyslipidemia  Prescribed rosuvastatin  Lab Results   Component Value Date    CHOL 260 (H) 03/28/2023    CHOL 142 01/12/2023    CHOL 157 12/01/2022     Lab Results   Component Value Date    TRIG 522 (H) 03/28/2023    TRIG 219 (H) 01/12/2023    TRIG 243 (H) 12/01/2022     Lab Results   Component Value Date    HDL 40 03/28/2023    HDL 18 (L) 01/12/2023    HDL 22 (L) 12/01/2022     Lab Results   Component Value Date     (H) 03/28/2023    LDL 87 01/12/2023    LDL 93 12/01/2022      Hypothyroidism  Had previously been prescribed Levothyroxine but her thyroid function test have been in acceptable range without medication  Lab Results   Component Value Date    TSH 4.230 (H) 03/28/2023      Kidney Disease  Previous Hydronephrosis and obstructing stone as well as nonobstructing renal stones.  She has had multiple procedures per urologist, Dr. Motley and  medical team.  Left nephrectomy per  medical urology team in October 2022.    Migraine  Headache pattern:  Headache sometimes there, sometimes not at all  Recent changes:  Headaches come more often than they used to  Frequency:  2 to 3 per week    The following portions of the patient's history were reviewed and updated as appropriate: allergies, current medications, past family history, past medical history, past social history, past surgical history and problem list.    Review of Systems   Constitutional:  Positive for activity change, appetite change and fatigue. Negative for chills, fever and unexpected weight change.   HENT:  Positive for congestion, ear pain, postnasal drip, rhinorrhea and sore throat. Negative for ear discharge, sinus pressure, sinus pain, trouble swallowing and voice change.    Eyes:  Negative for pain,  "discharge, redness, itching and visual disturbance.   Respiratory:  Positive for cough (Intermittent). Negative for shortness of breath and wheezing.    Cardiovascular:  Positive for leg swelling (Intermittent). Negative for chest pain and palpitations.   Gastrointestinal:  Positive for nausea and vomiting.   Endocrine: Negative for cold intolerance, heat intolerance, polydipsia, polyphagia and polyuria.   Genitourinary:  Negative for dysuria, flank pain, frequency and urgency.   Musculoskeletal:  Positive for arthralgias and gait problem.   Skin:  Negative for color change and rash.   Allergic/Immunologic: Positive for environmental allergies.   Neurological:  Positive for weakness, numbness (Left foot numbness secondary to CVA) and headaches (Migraines). Negative for dizziness, tremors, speech difficulty and light-headedness.   Hematological:  Negative for adenopathy.   Psychiatric/Behavioral:  Negative for confusion and decreased concentration. The patient is nervous/anxious.    All other systems reviewed and are negative.    Vital signs:  /94 (BP Location: Left arm, Patient Position: Sitting, Cuff Size: Adult)   Pulse 99   Temp 97.7 °F (36.5 °C) (Temporal)   Resp 14   Ht 162.6 cm (64.02\")   Wt (!) 140 kg (309 lb)   LMP  (LMP Unknown) Comment: last menses 6 years ago (HCG negative today)  SpO2 97%   BMI 53.01 kg/m²     Physical Exam  Vitals and nursing note reviewed.   Constitutional:       General: She is not in acute distress.     Appearance: She is well-developed.      Comments: Sitting in a wheelchair.  Her daughter is with her.   HENT:      Head: Normocephalic.      Right Ear: A middle ear effusion is present. Tympanic membrane is bulging. Tympanic membrane is not erythematous.      Left Ear: A middle ear effusion is present. Tympanic membrane is bulging. Tympanic membrane is not erythematous.      Nose: Congestion present.      Right Sinus: No maxillary sinus tenderness or frontal sinus " tenderness.      Left Sinus: No maxillary sinus tenderness or frontal sinus tenderness.      Mouth/Throat:      Mouth: Mucous membranes are moist.      Pharynx: No pharyngeal swelling, posterior oropharyngeal erythema or uvula swelling.   Eyes:      General: No scleral icterus.        Right eye: No discharge.         Left eye: No discharge.      Conjunctiva/sclera: Conjunctivae normal.   Cardiovascular:      Rate and Rhythm: Normal rate and regular rhythm.      Heart sounds: Normal heart sounds. No murmur heard.     No friction rub.   Pulmonary:      Effort: Pulmonary effort is normal. No respiratory distress.      Breath sounds: No decreased breath sounds, wheezing, rhonchi or rales.      Comments: Nonproductive of cough throughout visit  Abdominal:      General: There is no distension.      Palpations: Abdomen is soft.      Tenderness: There is no abdominal tenderness.   Musculoskeletal:         General: Tenderness present.      Cervical back: Neck supple.      Right lower leg: No edema.      Left lower leg: No edema.   Lymphadenopathy:      Cervical: No cervical adenopathy.   Skin:     General: Skin is warm and dry.      Capillary Refill: Capillary refill takes less than 2 seconds.      Findings: No erythema or rash.   Neurological:      Mental Status: She is alert and oriented to person, place, and time.   Psychiatric:         Mood and Affect: Mood and affect normal.         Speech: Speech normal.         Behavior: Behavior is cooperative.         Thought Content: Thought content normal.     Result Review: [facility] hospitalization records including imaging, labs, and discharge summary  Class 3 Severe Obesity (BMI >=40). Obesity-related health conditions include the following: hypertension, diabetes mellitus, and dyslipidemias. Obesity is increased. BMI is is above average; BMI management plan is completed.  Provided information on portion control and increasing exercise.     Assessment & Plan      Diagnoses and all orders for this visit:    1. Hospital discharge follow-up (Primary)  Comments:  Reviewed recent hospitalization records from [facility]    2. Type 2 diabetes mellitus with hyperglycemia, with long-term current use of insulin  Comments:  Findings and recommendations discussed with Natalia.  She will continue Lantus,Humalog and Dexcom  Orders:  -     Insulin Glargine (Lantus SoloStar) 100 UNIT/ML injection pen; Maximum daily dose of 75 units.  Dispense: 24 mL; Refill: 3  -     Insulin Lispro, 1 Unit Dial, (HUMALOG) 100 UNIT/ML solution pen-injector; Per sliding scale  Dispense: 15 mL; Refill: 0    3. Primary hypertension  Comments:  Not at goal today.  She does report during hospitalization her blood pressures were well controlled.  Continue Amlodipine and Metoprolol.  Orders:  -     metoprolol succinate XL (TOPROL-XL) 50 MG 24 hr tablet; Take 1 tablet by mouth Daily.  Dispense: 30 tablet; Refill: 0    4. Dyslipidemia  Comments:  Continue rosuvastatin.  May add Vascepa later    5. Factor 5 Leiden mutation, heterozygous  Comments:  Continue Eliquis  Orders:  -     apixaban (ELIQUIS) 5 MG tablet tablet; Take 1 tablet by mouth 2 (Two) Times a Day.  Dispense: 60 tablet; Refill: 3    6. Major depressive disorder, recurrent episode, mild degree  Comments:  Continue Cymbalta.    Orders:  -     DULoxetine (CYMBALTA) 30 MG capsule; Take 1 capsule by mouth 2 (Two) Times a Day.  Dispense: 60 capsule; Refill: 2    7. Other migraine without status migrainosus, not intractable  Comments:  Topamax 25 mg at night.  She will return in 2 weeks for increase if tolerating  Orders:  -     topiramate (TOPAMAX) 25 MG tablet; Take 1 tablet by mouth Daily.  Dispense: 14 tablet; Refill: 0    8. Vitamin B 12 deficiency  Comments:  Continue vitamin B12  Orders:  -     cyanocobalamin ([employer] Vitamin B-12) 2000 MCG tablet; Take 1 tablet by mouth Daily.  Dispense: 30 tablet; Refill: 2    9. Cough, unspecified  type  Comments:  Tessalon Perles 200 mg and Promethazine DM.  She does have a home nebulizer and encouraged her to utilize the [employer]oNeb  Orders:  -     promethazine-dextromethorphan (PROMETHAZINE-DM) 6.25-15 MG/5ML syrup; Take 5 mL by mouth 4 (Four) Times a Day As Needed for Cough.  Dispense: 180 mL; Refill: 0  -     benzonatate (TESSALON) 200 MG capsule; Take 1 capsule by mouth 3 (Three) Times a Day As Needed for Cough.  Dispense: 20 capsule; Refill: 1    10. Otalgia of both ears  Comments:  Ciprodex drops prescribed  Orders:  -     ciprofloxacin-dexAMETHasone (Ciprodex) 0.3-0.1 % otic suspension; Administer 4 drops into both ears 2 (Two) Times a Day.  Dispense: 7.5 mL; Refill: 0    11. Encounter for immunization  Comments:  Influenza vaccination given today  Orders:  -     Fluzone (or Fluarix & Flulaval for VFC) >6 Mos ([id number])    Other orders  -     fluconazole (DIFLUCAN) 150 MG tablet; Take 1 tablet by mouth Daily for 3 doses.  Dispense: 3 tablet; Refill: 0    Follow Up In 2 weeks  Findings and recommendations discussed with Natalia. Reviewed hospitalization records. Lifestyle modifications reinforced including nutrition and activity recommendations.  Natalia will follow up in 2 weeks sooner if problems/concerns occur.   She was given instructions and counseling regarding her condition or for health maintenance advice. Please see specific information pulled into the AVS if appropriate    This document has been electronically signed by:

## 2023-10-08 VITALS
DIASTOLIC BLOOD PRESSURE: 94 MMHG | BODY MASS INDEX: 50.02 KG/M2 | OXYGEN SATURATION: 97 % | TEMPERATURE: 97.7 F | WEIGHT: 293 LBS | RESPIRATION RATE: 14 BRPM | SYSTOLIC BLOOD PRESSURE: 148 MMHG | HEIGHT: 64 IN | HEART RATE: 99 BPM

## 2023-10-08 PROBLEM — N15.1 PERINEPHRIC ABSCESS: Status: RESOLVED | Noted: 2022-03-27 | Resolved: 2023-10-08

## 2023-10-10 ENCOUNTER — TELEPHONE (OUTPATIENT)
Dept: FAMILY MEDICINE CLINIC | Facility: CLINIC | Age: 38
End: 2023-10-10

## 2023-10-10 NOTE — TELEPHONE ENCOUNTER
Caller: Natalia Arzate    Relationship: Self    Best call back number: [phone] OKAY TO LEAVE A MESSAGE    What medication are you requesting: SYRINGES AND PROTONIX     If a prescription is needed, what is your preferred pharmacy and phone number:  [place], [place] - [address] [place] [address] [phone]  - [phone] FX     Additional notes: PATIENT STATED THAT SHE WAS PRESCRIBED HUMALOG BUT DIDN'T RECEIVED ANY SYRINGES. PATIENT WENT TO THE EMERGENCY ROOM TODAY AND WAS PRESCRIBED PROTONIX.  PATIENT STAED THAT THEY WOULDN'T FILL THE PRESCRIPTION BECAUSE THE INSURANCE WILL ONLY APPROVE THE MEDICATION THROUGH [facility].

## 2023-10-11 DIAGNOSIS — Z79.4 TYPE 2 DIABETES MELLITUS WITH HYPERGLYCEMIA, WITH LONG-TERM CURRENT USE OF INSULIN: Primary | ICD-10-CM

## 2023-10-11 DIAGNOSIS — Z29.9 PREVENTIVE MEASURE: ICD-10-CM

## 2023-10-11 DIAGNOSIS — E11.65 TYPE 2 DIABETES MELLITUS WITH HYPERGLYCEMIA, WITH LONG-TERM CURRENT USE OF INSULIN: Primary | ICD-10-CM

## 2023-10-11 RX ORDER — SYRINGE-NEEDLE,INSULIN,0.5 ML 27GX1/2"
SYRINGE, EMPTY DISPOSABLE MISCELLANEOUS
Qty: 50 EACH | Refills: 0 | Status: SHIPPED | OUTPATIENT
Start: 2023-10-11

## 2023-10-11 RX ORDER — PANTOPRAZOLE SODIUM 40 MG/1
40 TABLET, DELAYED RELEASE ORAL DAILY
Qty: 30 TABLET | Refills: 0 | Status: SHIPPED | OUTPATIENT
Start: 2023-10-11

## 2023-10-20 ENCOUNTER — READMISSION MANAGEMENT (OUTPATIENT)
Dept: CALL CENTER | Facility: HOSPITAL | Age: 38
End: 2023-10-20
Payer: COMMERCIAL

## 2023-10-20 NOTE — OUTREACH NOTE
Prep Survey      Flowsheet Row Responses   [facility] facility patient discharged from? Non-[facility]   Is LACE score < 7 ? Non-BH Discharge   Eligibility [facility]   Date of Admission 10/12/23   Date of Discharge 10/19/23   Discharge diagnosis Right flank pain   Does the patient have one of the following disease processes/diagnoses(primary or secondary)? Other   Prep survey completed? Yes            Olga ESTRADA - Registered Nurse

## 2023-10-23 ENCOUNTER — TRANSITIONAL CARE MANAGEMENT TELEPHONE ENCOUNTER (OUTPATIENT)
Dept: CALL CENTER | Facility: HOSPITAL | Age: 38
End: 2023-10-23
Payer: COMMERCIAL

## 2023-10-23 NOTE — OUTREACH NOTE
Call Center TCM Note      Flowsheet Row Responses   [facility] patient discharged from? Non-  []   Does the patient have one of the following disease processes/diagnoses(primary or secondary)? Other   TCM attempt successful? No   Unsuccessful attempts Attempt 2            Ese Lopez RN    10/23/2023, 14:27 EDT

## 2023-10-23 NOTE — OUTREACH NOTE
Call Center TCM Note      Flowsheet Row Responses   [facility] [place] facility patient discharged from? Non-BH   Does the patient have one of the following disease processes/diagnoses(primary or secondary)? Other   TCM attempt successful? No  [No VR]   Unsuccessful attempts Attempt 1   Call Status Left message            Ese Lopez RN    10/23/2023, 08:08 EDT

## 2023-10-24 ENCOUNTER — TRANSITIONAL CARE MANAGEMENT TELEPHONE ENCOUNTER (OUTPATIENT)
Dept: CALL CENTER | Facility: HOSPITAL | Age: 38
End: 2023-10-24
Payer: COMMERCIAL

## 2023-11-01 ENCOUNTER — TELEPHONE (OUTPATIENT)
Dept: FAMILY MEDICINE CLINIC | Facility: CLINIC | Age: 38
End: 2023-11-01

## 2023-11-01 DIAGNOSIS — Z79.4 TYPE 2 DIABETES MELLITUS WITH DIABETIC NEUROPATHY, WITH LONG-TERM CURRENT USE OF INSULIN: Chronic | ICD-10-CM

## 2023-11-01 DIAGNOSIS — I10 PRIMARY HYPERTENSION: Chronic | ICD-10-CM

## 2023-11-01 DIAGNOSIS — E11.40 TYPE 2 DIABETES MELLITUS WITH DIABETIC NEUROPATHY, WITH LONG-TERM CURRENT USE OF INSULIN: Chronic | ICD-10-CM

## 2023-11-01 DIAGNOSIS — E55.9 VITAMIN D DEFICIENCY: ICD-10-CM

## 2023-11-01 DIAGNOSIS — R05.9 COUGH, UNSPECIFIED TYPE: Primary | ICD-10-CM

## 2023-11-01 DIAGNOSIS — G43.809 OTHER MIGRAINE WITHOUT STATUS MIGRAINOSUS, NOT INTRACTABLE: Chronic | ICD-10-CM

## 2023-11-01 RX ORDER — GUAIFENESIN/DEXTROMETHORPHAN 100-10MG/5
SYRUP ORAL
Qty: 240 ML | Refills: 0 | Status: SHIPPED | OUTPATIENT
Start: 2023-11-01

## 2023-11-01 NOTE — TELEPHONE ENCOUNTER
Caller: Natalia Arzate    Relationship to patient: Self    Best call back number: [phone]     Date of exposure: UNKNOWN    Date of positive COVID19 test: 10.25.23    COVID19 symptoms: COUGH, BODY ACHES, NO TASTE OR SMELL    Date of initial quarantine: 10.25.23    Additional information or concerns: COUGH MEDICATION NOT COVERED BY HER INSURANCE, PLEASE ADVISE AND SEND ANOTHER MEDICATION    What is the patients preferred pharmacy: [facility]   [phone]

## 2023-11-01 NOTE — TELEPHONE ENCOUNTER
Caller: Natalia Arzate    Relationship: Self    Best call back number: [phone]     Requested Prescriptions:   Requested Prescriptions     Pending Prescriptions Disp Refills    metoprolol succinate XL (TOPROL-XL) 50 MG 24 hr tablet 30 tablet 0     Sig: Take 1 tablet by mouth Daily.    gabapentin (NEURONTIN) 300 MG capsule 30 capsule 0     Sig: Take 1 capsule by mouth At Night As Needed (neuropathy) for up to 30 days.    topiramate (TOPAMAX) 25 MG tablet 14 tablet 0     Sig: Take 1 tablet by mouth Daily.    vitamin D (ERGOCALCIFEROL) 1.25 MG (45972 UT) capsule capsule 5 capsule 0     Sig: Take 1 capsule by mouth 1 (One) Time Per Week. Prior to [facility] Admission, Patient was on:  takes on saturday        Pharmacy where request should be sent: [address] [phone] [facility] [phone] FX     Last office visit with prescribing clinician: 10/6/2023   Last telemedicine visit with prescribing clinician: Visit date not found   Next office visit with prescribing clinician: Visit date not found         Does the patient have less than a 3 day supply:  [x] Yes  [] No    Would you like a call back once the refill request has been completed: [] Yes [x] No    If the office needs to give you a call back, can they leave a voicemail: [] Yes [] No    Juan Castro Rep   11/01/23 10:05 EDT

## 2023-11-02 DIAGNOSIS — J45.909 MODERATE ASTHMA, UNSPECIFIED WHETHER COMPLICATED, UNSPECIFIED WHETHER PERSISTENT: Chronic | ICD-10-CM

## 2023-11-02 RX ORDER — METOPROLOL SUCCINATE 50 MG/1
50 TABLET, EXTENDED RELEASE ORAL DAILY
Qty: 30 TABLET | Refills: 0 | Status: SHIPPED | OUTPATIENT
Start: 2023-11-02

## 2023-11-02 RX ORDER — TOPIRAMATE 25 MG/1
25 TABLET ORAL DAILY
Qty: 30 TABLET | Refills: 0 | Status: SHIPPED | OUTPATIENT
Start: 2023-11-02

## 2023-11-02 RX ORDER — GABAPENTIN 300 MG/1
300 CAPSULE ORAL NIGHTLY PRN
Qty: 30 CAPSULE | Refills: 0 | Status: SHIPPED | OUTPATIENT
Start: 2023-11-02 | End: 2023-12-02

## 2023-11-02 RX ORDER — ALBUTEROL SULFATE 90 UG/1
AEROSOL, METERED RESPIRATORY (INHALATION)
Qty: 18 G | Refills: 3 | Status: SHIPPED | OUTPATIENT
Start: 2023-11-02

## 2023-11-02 RX ORDER — ERGOCALCIFEROL 1.25 MG/1
50000 CAPSULE ORAL WEEKLY
Qty: 5 CAPSULE | Refills: 0 | Status: SHIPPED | OUTPATIENT
Start: 2023-11-02

## 2023-11-07 ENCOUNTER — APPOINTMENT (OUTPATIENT)
Dept: CT IMAGING | Facility: HOSPITAL | Age: 38
End: 2023-11-07
Payer: COMMERCIAL

## 2023-11-07 ENCOUNTER — APPOINTMENT (OUTPATIENT)
Dept: GENERAL RADIOLOGY | Facility: HOSPITAL | Age: 38
End: 2023-11-07
Payer: COMMERCIAL

## 2023-11-07 ENCOUNTER — HOSPITAL ENCOUNTER (EMERGENCY)
Facility: HOSPITAL | Age: 38
Discharge: LEFT BEFORE DISCHARGE | End: 2023-11-07
Attending: STUDENT IN AN ORGANIZED HEALTH CARE EDUCATION/TRAINING PROGRAM | Admitting: STUDENT IN AN ORGANIZED HEALTH CARE EDUCATION/TRAINING PROGRAM
Payer: COMMERCIAL

## 2023-11-07 VITALS
RESPIRATION RATE: 22 BRPM | TEMPERATURE: 98.1 F | BODY MASS INDEX: 50.02 KG/M2 | WEIGHT: 293 LBS | DIASTOLIC BLOOD PRESSURE: 84 MMHG | OXYGEN SATURATION: 98 % | HEART RATE: 98 BPM | SYSTOLIC BLOOD PRESSURE: 135 MMHG | HEIGHT: 64 IN

## 2023-11-07 DIAGNOSIS — E11.65 HYPERGLYCEMIA DUE TO DIABETES MELLITUS: ICD-10-CM

## 2023-11-07 DIAGNOSIS — N20.1 RIGHT URETERAL STONE: Primary | ICD-10-CM

## 2023-11-07 LAB
A-A DO2: 18.7 MMHG (ref 0–300)
ACETONE BLD QL: ABNORMAL
ALBUMIN SERPL-MCNC: 4.2 G/DL (ref 3.5–5.2)
ALBUMIN/GLOB SERPL: 1.2 G/DL
ALP SERPL-CCNC: 99 U/L (ref 39–117)
ALT SERPL W P-5'-P-CCNC: 20 U/L (ref 1–33)
AMPHET+METHAMPHET UR QL: NEGATIVE
AMPHETAMINES UR QL: NEGATIVE
AMYLASE SERPL-CCNC: 35 U/L (ref 28–100)
ANION GAP SERPL CALCULATED.3IONS-SCNC: 18.4 MMOL/L (ref 5–15)
APTT PPP: 29.8 SECONDS (ref 26.5–34.5)
ARTERIAL PATENCY WRIST A: POSITIVE
AST SERPL-CCNC: 22 U/L (ref 1–32)
ATMOSPHERIC PRESS: 727 MMHG
BACTERIA UR QL AUTO: NORMAL /HPF
BARBITURATES UR QL SCN: NEGATIVE
BASE EXCESS BLDA CALC-SCNC: -5.5 MMOL/L (ref 0–2)
BASOPHILS # BLD AUTO: 0.05 10*3/MM3 (ref 0–0.2)
BASOPHILS NFR BLD AUTO: 0.9 % (ref 0–1.5)
BDY SITE: ABNORMAL
BENZODIAZ UR QL SCN: NEGATIVE
BILIRUB SERPL-MCNC: 0.3 MG/DL (ref 0–1.2)
BILIRUB UR QL STRIP: NEGATIVE
BUN SERPL-MCNC: 26 MG/DL (ref 6–20)
BUN/CREAT SERPL: 29.9 (ref 7–25)
BUPRENORPHINE SERPL-MCNC: NEGATIVE NG/ML
CALCIUM SPEC-SCNC: 10 MG/DL (ref 8.6–10.5)
CANNABINOIDS SERPL QL: NEGATIVE
CHLORIDE SERPL-SCNC: 94 MMOL/L (ref 98–107)
CLARITY UR: CLEAR
CO2 BLDA-SCNC: 21.2 MMOL/L (ref 22–33)
CO2 SERPL-SCNC: 17.6 MMOL/L (ref 22–29)
COCAINE UR QL: NEGATIVE
COHGB MFR BLD: 1.8 % (ref 0–5)
COLOR UR: YELLOW
CREAT SERPL-MCNC: 0.87 MG/DL (ref 0.57–1)
CRP SERPL-MCNC: 1.37 MG/DL (ref 0–0.5)
D-LACTATE SERPL-SCNC: 3.6 MMOL/L (ref 0.5–2)
DEPRECATED RDW RBC AUTO: 46.3 FL (ref 37–54)
EGFRCR SERPLBLD CKD-EPI 2021: 88.1 ML/MIN/1.73
EOSINOPHIL # BLD AUTO: 0.17 10*3/MM3 (ref 0–0.4)
EOSINOPHIL NFR BLD AUTO: 3.1 % (ref 0.3–6.2)
ERYTHROCYTE [DISTWIDTH] IN BLOOD BY AUTOMATED COUNT: 14.8 % (ref 12.3–15.4)
ERYTHROCYTE [SEDIMENTATION RATE] IN BLOOD: 43 MM/HR (ref 0–20)
FENTANYL UR-MCNC: NEGATIVE NG/ML
GLOBULIN UR ELPH-MCNC: 3.6 GM/DL
GLUCOSE BLDC GLUCOMTR-MCNC: 428 MG/DL (ref 70–130)
GLUCOSE BLDC GLUCOMTR-MCNC: 532 MG/DL (ref 70–130)
GLUCOSE SERPL-MCNC: 536 MG/DL (ref 65–99)
GLUCOSE UR STRIP-MCNC: ABNORMAL MG/DL
HBA1C MFR BLD: 10.8 % (ref 4.8–5.6)
HCO3 BLDA-SCNC: 20 MMOL/L (ref 20–26)
HCT VFR BLD AUTO: 36.6 % (ref 34–46.6)
HCT VFR BLD CALC: 38.3 % (ref 38–51)
HGB BLD-MCNC: 12.2 G/DL (ref 12–15.9)
HGB BLDA-MCNC: 12.5 G/DL (ref 13.5–17.5)
HGB UR QL STRIP.AUTO: NEGATIVE
HOLD SPECIMEN: NORMAL
HYALINE CASTS UR QL AUTO: NORMAL /LPF
IMM GRANULOCYTES # BLD AUTO: 0.03 10*3/MM3 (ref 0–0.05)
IMM GRANULOCYTES NFR BLD AUTO: 0.6 % (ref 0–0.5)
INHALED O2 CONCENTRATION: 21 %
INR PPP: 0.96 (ref 0.9–1.1)
KETONES UR QL STRIP: ABNORMAL
LEUKOCYTE ESTERASE UR QL STRIP.AUTO: NEGATIVE
LIPASE SERPL-CCNC: 50 U/L (ref 13–60)
LYMPHOCYTES # BLD AUTO: 1.47 10*3/MM3 (ref 0.7–3.1)
LYMPHOCYTES NFR BLD AUTO: 27.2 % (ref 19.6–45.3)
Lab: ABNORMAL
MAGNESIUM SERPL-MCNC: 1.7 MG/DL (ref 1.6–2.6)
MCH RBC QN AUTO: 29.1 PG (ref 26.6–33)
MCHC RBC AUTO-ENTMCNC: 33.3 G/DL (ref 31.5–35.7)
MCV RBC AUTO: 87.4 FL (ref 79–97)
METHADONE UR QL SCN: NEGATIVE
METHGB BLD QL: <-0.1 % (ref 0–3)
MODALITY: ABNORMAL
MONOCYTES # BLD AUTO: 0.54 10*3/MM3 (ref 0.1–0.9)
MONOCYTES NFR BLD AUTO: 10 % (ref 5–12)
NEUTROPHILS NFR BLD AUTO: 3.15 10*3/MM3 (ref 1.7–7)
NEUTROPHILS NFR BLD AUTO: 58.2 % (ref 42.7–76)
NITRITE UR QL STRIP: NEGATIVE
NRBC BLD AUTO-RTO: 0 /100 WBC (ref 0–0.2)
OPIATES UR QL: NEGATIVE
OXYCODONE UR QL SCN: POSITIVE
OXYHGB MFR BLDV: 94.8 % (ref 94–99)
PCO2 BLDA: 38.5 MM HG (ref 35–45)
PCO2 TEMP ADJ BLD: ABNORMAL MM[HG]
PCP UR QL SCN: NEGATIVE
PH BLDA: 7.33 PH UNITS (ref 7.35–7.45)
PH UR STRIP.AUTO: 6 [PH] (ref 5–8)
PH, TEMP CORRECTED: ABNORMAL
PLATELET # BLD AUTO: 209 10*3/MM3 (ref 140–450)
PMV BLD AUTO: 9.5 FL (ref 6–12)
PO2 BLDA: 80.7 MM HG (ref 83–108)
PO2 TEMP ADJ BLD: ABNORMAL MM[HG]
POTASSIUM SERPL-SCNC: 4.7 MMOL/L (ref 3.5–5.2)
PROCALCITONIN SERPL-MCNC: 0.4 NG/ML (ref 0–0.25)
PROT SERPL-MCNC: 7.8 G/DL (ref 6–8.5)
PROT UR QL STRIP: ABNORMAL
PROTHROMBIN TIME: 13.3 SECONDS (ref 12.1–14.7)
RBC # BLD AUTO: 4.19 10*6/MM3 (ref 3.77–5.28)
RBC # UR STRIP: NORMAL /HPF
REF LAB TEST METHOD: NORMAL
SAO2 % BLDCOA: 96.1 % (ref 94–99)
SODIUM SERPL-SCNC: 130 MMOL/L (ref 136–145)
SP GR UR STRIP: 1.03 (ref 1–1.03)
SQUAMOUS #/AREA URNS HPF: NORMAL /HPF
TRICYCLICS UR QL SCN: NEGATIVE
UROBILINOGEN UR QL STRIP: ABNORMAL
VENTILATOR MODE: ABNORMAL
WBC # UR STRIP: NORMAL /HPF
WBC NRBC COR # BLD: 5.41 10*3/MM3 (ref 3.4–10.8)
WHOLE BLOOD HOLD COAG: NORMAL
WHOLE BLOOD HOLD SPECIMEN: NORMAL

## 2023-11-07 PROCEDURE — 36600 WITHDRAWAL OF ARTERIAL BLOOD: CPT

## 2023-11-07 PROCEDURE — 72125 CT NECK SPINE W/O DYE: CPT

## 2023-11-07 PROCEDURE — 99284 EMERGENCY DEPT VISIT MOD MDM: CPT

## 2023-11-07 PROCEDURE — 71045 X-RAY EXAM CHEST 1 VIEW: CPT | Performed by: RADIOLOGY

## 2023-11-07 PROCEDURE — 83735 ASSAY OF MAGNESIUM: CPT | Performed by: PHYSICIAN ASSISTANT

## 2023-11-07 PROCEDURE — 80053 COMPREHEN METABOLIC PANEL: CPT | Performed by: STUDENT IN AN ORGANIZED HEALTH CARE EDUCATION/TRAINING PROGRAM

## 2023-11-07 PROCEDURE — 93005 ELECTROCARDIOGRAM TRACING: CPT | Performed by: PHYSICIAN ASSISTANT

## 2023-11-07 PROCEDURE — 80307 DRUG TEST PRSMV CHEM ANLYZR: CPT | Performed by: PHYSICIAN ASSISTANT

## 2023-11-07 PROCEDURE — 72125 CT NECK SPINE W/O DYE: CPT | Performed by: RADIOLOGY

## 2023-11-07 PROCEDURE — 82375 ASSAY CARBOXYHB QUANT: CPT

## 2023-11-07 PROCEDURE — 83050 HGB METHEMOGLOBIN QUAN: CPT

## 2023-11-07 PROCEDURE — 82805 BLOOD GASES W/O2 SATURATION: CPT

## 2023-11-07 PROCEDURE — 85652 RBC SED RATE AUTOMATED: CPT | Performed by: PHYSICIAN ASSISTANT

## 2023-11-07 PROCEDURE — 85730 THROMBOPLASTIN TIME PARTIAL: CPT | Performed by: PHYSICIAN ASSISTANT

## 2023-11-07 PROCEDURE — 83036 HEMOGLOBIN GLYCOSYLATED A1C: CPT | Performed by: PHYSICIAN ASSISTANT

## 2023-11-07 PROCEDURE — 82150 ASSAY OF AMYLASE: CPT | Performed by: PHYSICIAN ASSISTANT

## 2023-11-07 PROCEDURE — 82948 REAGENT STRIP/BLOOD GLUCOSE: CPT

## 2023-11-07 PROCEDURE — 86140 C-REACTIVE PROTEIN: CPT | Performed by: PHYSICIAN ASSISTANT

## 2023-11-07 PROCEDURE — 84145 PROCALCITONIN (PCT): CPT | Performed by: PHYSICIAN ASSISTANT

## 2023-11-07 PROCEDURE — 82009 KETONE BODYS QUAL: CPT | Performed by: PHYSICIAN ASSISTANT

## 2023-11-07 PROCEDURE — 87040 BLOOD CULTURE FOR BACTERIA: CPT | Performed by: STUDENT IN AN ORGANIZED HEALTH CARE EDUCATION/TRAINING PROGRAM

## 2023-11-07 PROCEDURE — 25010000002 ORPHENADRINE CITRATE PER 60 MG: Performed by: PHYSICIAN ASSISTANT

## 2023-11-07 PROCEDURE — 96361 HYDRATE IV INFUSION ADD-ON: CPT

## 2023-11-07 PROCEDURE — 36415 COLL VENOUS BLD VENIPUNCTURE: CPT

## 2023-11-07 PROCEDURE — 71045 X-RAY EXAM CHEST 1 VIEW: CPT

## 2023-11-07 PROCEDURE — 85025 COMPLETE CBC W/AUTO DIFF WBC: CPT | Performed by: STUDENT IN AN ORGANIZED HEALTH CARE EDUCATION/TRAINING PROGRAM

## 2023-11-07 PROCEDURE — 96374 THER/PROPH/DIAG INJ IV PUSH: CPT

## 2023-11-07 PROCEDURE — 63710000001 INSULIN REGULAR HUMAN PER 5 UNITS: Performed by: STUDENT IN AN ORGANIZED HEALTH CARE EDUCATION/TRAINING PROGRAM

## 2023-11-07 PROCEDURE — 83605 ASSAY OF LACTIC ACID: CPT | Performed by: STUDENT IN AN ORGANIZED HEALTH CARE EDUCATION/TRAINING PROGRAM

## 2023-11-07 PROCEDURE — 74176 CT ABD & PELVIS W/O CONTRAST: CPT | Performed by: RADIOLOGY

## 2023-11-07 PROCEDURE — 81001 URINALYSIS AUTO W/SCOPE: CPT | Performed by: PHYSICIAN ASSISTANT

## 2023-11-07 PROCEDURE — 83690 ASSAY OF LIPASE: CPT | Performed by: PHYSICIAN ASSISTANT

## 2023-11-07 PROCEDURE — 25810000003 SODIUM CHLORIDE 0.9 % SOLUTION: Performed by: PHYSICIAN ASSISTANT

## 2023-11-07 PROCEDURE — 74176 CT ABD & PELVIS W/O CONTRAST: CPT

## 2023-11-07 PROCEDURE — 85610 PROTHROMBIN TIME: CPT | Performed by: PHYSICIAN ASSISTANT

## 2023-11-07 RX ORDER — ACETAMINOPHEN 500 MG
1000 TABLET ORAL ONCE
Status: DISCONTINUED | OUTPATIENT
Start: 2023-11-07 | End: 2023-11-07

## 2023-11-07 RX ORDER — ORPHENADRINE CITRATE 30 MG/ML
60 INJECTION INTRAMUSCULAR; INTRAVENOUS ONCE
Status: COMPLETED | OUTPATIENT
Start: 2023-11-07 | End: 2023-11-07

## 2023-11-07 RX ORDER — KETOROLAC TROMETHAMINE 30 MG/ML
30 INJECTION, SOLUTION INTRAMUSCULAR; INTRAVENOUS ONCE
Status: DISCONTINUED | OUTPATIENT
Start: 2023-11-07 | End: 2023-11-07

## 2023-11-07 RX ORDER — OXYCODONE AND ACETAMINOPHEN 10; 325 MG/1; MG/1
1 TABLET ORAL ONCE
Status: COMPLETED | OUTPATIENT
Start: 2023-11-07 | End: 2023-11-07

## 2023-11-07 RX ORDER — TAMSULOSIN HYDROCHLORIDE 0.4 MG/1
0.4 CAPSULE ORAL ONCE
Status: COMPLETED | OUTPATIENT
Start: 2023-11-07 | End: 2023-11-07

## 2023-11-07 RX ORDER — SODIUM CHLORIDE 0.9 % (FLUSH) 0.9 %
10 SYRINGE (ML) INJECTION AS NEEDED
Status: DISCONTINUED | OUTPATIENT
Start: 2023-11-07 | End: 2023-11-07 | Stop reason: HOSPADM

## 2023-11-07 RX ADMIN — SODIUM CHLORIDE 2000 ML: 9 INJECTION, SOLUTION INTRAVENOUS at 10:23

## 2023-11-07 RX ADMIN — INSULIN HUMAN 10 UNITS: 100 INJECTION, SOLUTION PARENTERAL at 11:38

## 2023-11-07 RX ADMIN — ORPHENADRINE CITRATE 60 MG: 60 INJECTION INTRAMUSCULAR; INTRAVENOUS at 10:24

## 2023-11-07 RX ADMIN — OXYCODONE AND ACETAMINOPHEN 1 TABLET: 10; 325 TABLET ORAL at 11:38

## 2023-11-07 RX ADMIN — TAMSULOSIN HYDROCHLORIDE 0.4 MG: 0.4 CAPSULE ORAL at 11:38

## 2023-11-07 NOTE — ED PROVIDER NOTES
Subjective   History of Present Illness  38-year-old female who presents to the ED today for neck pain.  She states this started about 3 or 4:00 this morning.  She states she woke up with a right-sided neck pain that shoots down into the right side of her back.  She is also been having difficulty urinating since midnight.  She states she last urinated sometime last night.  She states has been having intermittent nausea and vomiting for the last couple weeks.  She took Zofran and Tylenol at home without any relief.  She was also noted to have elevated blood sugar today.  It was 532 upon arrival to the ED.  She states she took 75 units of Lantus and 40 units of her fast acting insulin this morning around 7 AM.  She denies any diarrhea.  She denies any fever.  She denies any chest pain or shortness of breath.    History provided by:  Patient  Neck Pain  Pain location:  R side  Quality:  Shooting  Radiates to: to back.  Pain severity:  Severe  Pain is:  Same all the time  Onset quality:  Sudden  Duration:  6 hours  Timing:  Constant  Progression:  Unchanged  Chronicity:  New  Context: not fall and not recent injury    Relieved by:  Nothing  Worsened by:  Bending and twisting  Associated symptoms: no bladder incontinence, no bowel incontinence, no chest pain, no fever, no headaches, no leg pain, no numbness, no paresis, no photophobia, no syncope, no tingling, no visual change, no weakness and no weight loss        Review of Systems   Constitutional: Negative.  Negative for fever and weight loss.   HENT: Negative.     Eyes: Negative.  Negative for photophobia.   Respiratory: Negative.     Cardiovascular:  Negative for chest pain and syncope.   Gastrointestinal:  Positive for nausea and vomiting. Negative for abdominal pain, bowel incontinence and diarrhea.   Genitourinary:  Positive for difficulty urinating. Negative for bladder incontinence.   Musculoskeletal:  Positive for back pain and neck pain.   Skin: Negative.     Neurological:  Negative for tingling, weakness, numbness and headaches.   Psychiatric/Behavioral: Negative.     All other systems reviewed and are negative.      Past Medical History:   Diagnosis Date    A-fib     Abnormal ECG     Anemia     Anxiety     Asthma     Cancer     Ovarian    Depression     Diabetes mellitus     DVT (deep venous thrombosis)     Factor 5 Leiden mutation, heterozygous     Fibroid     GERD (gastroesophageal reflux disease)     Gout     H/O abdominal abscess     History of sepsis     History of transfusion     Hyperlipidemia     Hypothyroid     Kidney stone     Migraines     Neuropathy     Ovarian cancer 2021    Ovarian cyst     PE (pulmonary embolism)     Polycystic ovary syndrome     Preeclampsia     Rh incompatibility     Stroke     TIA (transient ischemic attack)     Urinary tract infection     Varicella        Allergies   Allergen Reactions    Toradol [Ketorolac Tromethamine] Anaphylaxis and Hives    Haldol [Haloperidol] Hives and Mental Status Change    Tramadol Hives and Swelling    Amoxicillin Hives and Rash    Penicillins Hives and Rash       Past Surgical History:   Procedure Laterality Date    ABDOMINAL SURGERY      CARDIAC CATHETERIZATION       SECTION      CHOLECYSTECTOMY      COLONOSCOPY      ENDOSCOPY      EXTRACORPOREAL SHOCK WAVE LITHOTRIPSY (ESWL) Left 10/22/2021    Procedure: EXTRACORPOREAL SHOCKWAVE LITHOTRIPSY;  Surgeon: Milan Motley MD;  Location: [facility];  Service: Urology;  Laterality: Left;    LITHOTRIPSY      RIGHT OOPHORECTOMY      URETEROSCOPY LASER LITHOTRIPSY WITH STENT INSERTION Left 10/01/2021    Procedure: URETEROSCOPY WITH STENT PLACEMENT;  Surgeon: Milan Motley MD;  Location: [facility];  Service: Urology;  Laterality: Left;    URETEROSCOPY LASER LITHOTRIPSY WITH STENT INSERTION Left 2022    Procedure: URETEROSCOPY STENT REMOVAL;  Surgeon: Milan Motley MD;  Location: [facility];  Service: Urology;   "Laterality: Left;    URETEROSCOPY LASER LITHOTRIPSY WITH STENT INSERTION Left 6/29/2022    Procedure: CYSTOSCOPY RETROGRADE LEFT WITH STENT PLACEMENT;  Surgeon: Milan Motley MD;  Location: [facility];  Service: Urology;  Laterality: Left;       Family History   Problem Relation Age of Onset    Diabetes Father     Hypertension Father     Heart attack Father     Hypertension Mother     No Known Problems Paternal Grandmother     No Known Problems Paternal Grandfather     No Known Problems Maternal Grandmother     No Known Problems Maternal Grandfather     No Known Problems Sister     No Known Problems Brother     No Known Problems Daughter     No Known Problems Son     No Known Problems Cousin     Rheum arthritis Neg Hx     Osteoarthritis Neg Hx     Asthma Neg Hx     Heart failure Neg Hx     Hyperlipidemia Neg Hx     Migraines Neg Hx     Rashes / Skin problems Neg Hx     Seizures Neg Hx     Stroke Neg Hx     Thyroid disease Neg Hx        Social History     Socioeconomic History    Marital status:    Tobacco Use    Smoking status: Never     Passive exposure: Never    Smokeless tobacco: Never   Vaping Use    Vaping Use: Never used   Substance and Sexual Activity    Alcohol use: No    Drug use: Never     Comment: Pt has track marks on right arm. Pt states \"It's from my INR being drawn.\"     Sexual activity: Not Currently     Partners: Male     Birth control/protection: None           Objective   Physical Exam  Vitals and nursing note reviewed.   Constitutional:       General: She is not in acute distress.     Appearance: Normal appearance. She is obese. She is not diaphoretic.   HENT:      Head: Normocephalic and atraumatic.      Right Ear: External ear normal.      Left Ear: External ear normal.      Nose: Nose normal.   Eyes:      Extraocular Movements: Extraocular movements intact.      Conjunctiva/sclera: Conjunctivae normal.      Pupils: Pupils are equal, round, and reactive to light. "   Cardiovascular:      Rate and Rhythm: Normal rate and regular rhythm.      Pulses: Normal pulses.      Heart sounds: Normal heart sounds.   Pulmonary:      Effort: Pulmonary effort is normal.      Breath sounds: Normal breath sounds. No wheezing or rhonchi.   Chest:      Chest wall: No tenderness.   Abdominal:      General: Bowel sounds are normal.      Palpations: Abdomen is soft.      Tenderness: There is no abdominal tenderness. There is no right CVA tenderness, left CVA tenderness, guarding or rebound.   Musculoskeletal:         General: Normal range of motion.      Cervical back: Normal range of motion and neck supple. Tenderness (right side of neck) present. No rigidity or crepitus. Muscular tenderness present. No spinous process tenderness.   Lymphadenopathy:      Cervical: No cervical adenopathy.   Skin:     General: Skin is warm and dry.      Capillary Refill: Capillary refill takes less than 2 seconds.   Neurological:      General: No focal deficit present.      Mental Status: She is alert and oriented to person, place, and time.   Psychiatric:         Mood and Affect: Mood normal.         Procedures       Results for orders placed or performed during the hospital encounter of 11/07/23   Comprehensive Metabolic Panel    Specimen: Blood   Result Value Ref Range    Glucose 536 (C) 65 - 99 mg/dL    BUN 26 (H) 6 - 20 mg/dL    Creatinine 0.87 0.57 - 1.00 mg/dL    Sodium 130 (L) 136 - 145 mmol/L    Potassium 4.7 3.5 - 5.2 mmol/L    Chloride 94 (L) 98 - 107 mmol/L    CO2 17.6 (L) 22.0 - 29.0 mmol/L    Calcium 10.0 8.6 - 10.5 mg/dL    Total Protein 7.8 6.0 - 8.5 g/dL    Albumin 4.2 3.5 - 5.2 g/dL    ALT (SGPT) 20 1 - 33 U/L    AST (SGOT) 22 1 - 32 U/L    Alkaline Phosphatase 99 39 - 117 U/L    Total Bilirubin 0.3 0.0 - 1.2 mg/dL    Globulin 3.6 gm/dL    A/G Ratio 1.2 g/dL    BUN/Creatinine Ratio 29.9 (H) 7.0 - 25.0    Anion Gap 18.4 (H) 5.0 - 15.0 mmol/L    eGFR 88.1 >60.0 mL/min/1.73   Lactic Acid, Plasma     Specimen: Blood   Result Value Ref Range    Lactate 3.6 (C) 0.5 - 2.0 mmol/L   CBC Auto Differential    Specimen: Blood   Result Value Ref Range    WBC 5.41 3.40 - 10.80 10*3/mm3    RBC 4.19 3.77 - 5.28 10*6/mm3    Hemoglobin 12.2 12.0 - 15.9 g/dL    Hematocrit 36.6 34.0 - 46.6 %    MCV 87.4 79.0 - 97.0 fL    MCH 29.1 26.6 - 33.0 pg    MCHC 33.3 31.5 - 35.7 g/dL    RDW 14.8 12.3 - 15.4 %    RDW-SD 46.3 37.0 - 54.0 fl    MPV 9.5 6.0 - 12.0 fL    Platelets 209 140 - 450 10*3/mm3    Neutrophil % 58.2 42.7 - 76.0 %    Lymphocyte % 27.2 19.6 - 45.3 %    Monocyte % 10.0 5.0 - 12.0 %    Eosinophil % 3.1 0.3 - 6.2 %    Basophil % 0.9 0.0 - 1.5 %    Immature Grans % 0.6 (H) 0.0 - 0.5 %    Neutrophils, Absolute 3.15 1.70 - 7.00 10*3/mm3    Lymphocytes, Absolute 1.47 0.70 - 3.10 10*3/mm3    Monocytes, Absolute 0.54 0.10 - 0.90 10*3/mm3    Eosinophils, Absolute 0.17 0.00 - 0.40 10*3/mm3    Basophils, Absolute 0.05 0.00 - 0.20 10*3/mm3    Immature Grans, Absolute 0.03 0.00 - 0.05 10*3/mm3    nRBC 0.0 0.0 - 0.2 /100 WBC   Protime-INR    Specimen: Blood   Result Value Ref Range    Protime 13.3 12.1 - 14.7 Seconds    INR 0.96 0.90 - 1.10   aPTT    Specimen: Blood   Result Value Ref Range    PTT 29.8 26.5 - 34.5 seconds   Lipase    Specimen: Blood   Result Value Ref Range    Lipase 50 13 - 60 U/L   Amylase    Specimen: Blood   Result Value Ref Range    Amylase 35 28 - 100 U/L   Urinalysis With Culture If Indicated - Urine, Clean Catch    Specimen: Urine, Clean Catch   Result Value Ref Range    Color, UA Yellow Yellow, Straw    Appearance, UA Clear Clear    pH, UA 6.0 5.0 - 8.0    Specific Gravity, UA 1.027 1.005 - 1.030    Glucose, UA >=1000 mg/dL (3+) (A) Negative    Ketones, UA 15 mg/dL (1+) (A) Negative    Bilirubin, UA Negative Negative    Blood, UA Negative Negative    Protein, UA 30 mg/dL (1+) (A) Negative    Leuk Esterase, UA Negative Negative    Nitrite, UA Negative Negative    Urobilinogen, UA 0.2 E.U./dL 0.2 - 1.0 E.U./dL    Procalcitonin    Specimen: Blood   Result Value Ref Range    Procalcitonin 0.40 (H) 0.00 - 0.25 ng/mL   Sedimentation Rate    Specimen: Blood   Result Value Ref Range    Sed Rate 43 (H) 0 - 20 mm/hr   C-reactive Protein    Specimen: Blood   Result Value Ref Range    C-Reactive Protein 1.37 (H) 0.00 - 0.50 mg/dL   Urine Drug Screen - Urine, Clean Catch    Specimen: Urine, Clean Catch   Result Value Ref Range    THC, Screen, Urine Negative Negative    Phencyclidine (PCP), Urine Negative Negative    Cocaine Screen, Urine Negative Negative    Methamphetamine, Ur Negative Negative    Opiate Screen Negative Negative    Amphetamine Screen, Urine Negative Negative    Benzodiazepine Screen, Urine Negative Negative    Tricyclic Antidepressants Screen Negative Negative    Methadone Screen, Urine Negative Negative    Barbiturates Screen, Urine Negative Negative    Oxycodone Screen, Urine Positive (A) Negative    Buprenorphine, Screen, Urine Negative Negative   Magnesium    Specimen: Blood   Result Value Ref Range    Magnesium 1.7 1.6 - 2.6 mg/dL   Hemoglobin A1c    Specimen: Blood   Result Value Ref Range    Hemoglobin A1C 10.80 (H) 4.80 - 5.60 %   Acetone    Specimen: Blood   Result Value Ref Range    Acetone Small (C) Negative   Blood Gas, Arterial With Co-Ox    Specimen: Arterial Blood   Result Value Ref Range    Site Left Radial     Apolinar's Test Positive     pH, Arterial 7.325 (L) 7.350 - 7.450 pH units    pCO2, Arterial 38.5 35.0 - 45.0 mm Hg    pO2, Arterial 80.7 (L) 83.0 - 108.0 mm Hg    HCO3, Arterial 20.0 20.0 - 26.0 mmol/L    Base Excess, Arterial -5.5 (L) 0.0 - 2.0 mmol/L    O2 Saturation, Arterial 96.1 94.0 - 99.0 %    Hemoglobin, Blood Gas 12.5 (L) 13.5 - 17.5 g/dL    Hematocrit, Blood Gas 38.3 38.0 - 51.0 %    Oxyhemoglobin 94.8 94 - 99 %    Methemoglobin <-0.10 (L) 0.00 - 3.00 %    Carboxyhemoglobin 1.8 0 - 5 %    A-a DO2 18.7 0.0 - 300.0 mmHg    CO2 Content 21.2 (L) 22 - 33 mmol/L    Barometric Pressure for  Blood Gas 727 mmHg    Modality Room Air     FIO2 21 %    Ventilator Mode NA     Collected by [id number]     pH, Temp Corrected      pCO2, Temperature Corrected      pO2, Temperature Corrected     Fentanyl, Urine - Urine, Clean Catch    Specimen: Urine, Clean Catch   Result Value Ref Range    Fentanyl, Urine Negative Negative   Urinalysis, Microscopic Only - Urine, Clean Catch    Specimen: Urine, Clean Catch   Result Value Ref Range    RBC, UA 0-2 None Seen, 0-2 /HPF    WBC, UA 0-2 None Seen, 0-2 /HPF    Bacteria, UA None Seen None Seen /HPF    Squamous Epithelial Cells, UA 0-2 None Seen, 0-2 /HPF    Hyaline Casts, UA None Seen None Seen /LPF    Methodology Automated Microscopy    POC Glucose Once    Specimen: Blood   Result Value Ref Range    Glucose 532 (C) 70 - 130 mg/dL   POC Glucose Once    Specimen: Blood   Result Value Ref Range    Glucose 428 (C) 70 - 130 mg/dL   ECG 12 Lead QT Measurement   Result Value Ref Range    QT Interval 364 ms    QTC Interval 467 ms   Green Top (Gel)   Result Value Ref Range    Extra Tube Hold for add-ons.    Lavender Top   Result Value Ref Range    Extra Tube hold for add-on    Light Blue Top   Result Value Ref Range    Extra Tube Hold for add-ons.           ED Course  ED Course as of 11/07/23 1504   Tue Nov 07, 2023   0957 Glucose(!!): 532 [AH]   1025 XR Chest 1 View  FINDINGS:    LUNGS AND PLEURAL SPACES:  Unremarkable as visualized.  No  consolidation.  No pneumothorax.    HEART:  Unremarkable as visualized.  No cardiomegaly.    MEDIASTINUM:  Unremarkable as visualized.    BONES/JOINTS:  Unremarkable as visualized.     IMPRESSION:    Unremarkable exam. No acute cardiopulmonary findings identified.   [AH]   1031 ECG 12 Lead QT Measurement  Normal sinus rhythm, rate 89, QTc 467, no acute ST or T wave changes [CW]   1051 Acetone(!!): Small [AH]   1121 CT Abdomen Pelvis Without Contrast  FINDINGS:    LUNG BASES:  Unremarkable as visualized.  No mass.  No consolidation.      ABDOMEN:     LIVER:  Fatty liver hepatomegaly.    GALLBLADDER AND BILE DUCTS:  Unremarkable as visualized.  No calcified  stones.  No ductal dilation.    PANCREAS:  Unremarkable as visualized.  No ductal dilation.    SPLEEN:  Unremarkable as visualized.  No splenomegaly.    ADRENALS:  Unremarkable as visualized.  No mass.    KIDNEYS AND URETERS:  The left kidney is not visualized.  Probable  right distal ureteral 6 mm density causing no significant  hydronephrosis.    STOMACH AND BOWEL:  Unremarkable as visualized.  No obstruction.  No  mucosal thickening.      PELVIS:    APPENDIX:  No findings to suggest acute appendicitis.    BLADDER:  Unremarkable as visualized.  No stones.    REPRODUCTIVE:  Unremarkable as visualized.      ABDOMEN and PELVIS:    INTRAPERITONEAL SPACE:  Unremarkable as visualized.  No free air.  No  significant fluid collection.    BONES/JOINTS:  No acute fracture.  No dislocation.    SOFT TISSUES:  Unremarkable as visualized.    VASCULATURE:  Unremarkable as visualized.  No abdominal aortic  aneurysm.    LYMPH NODES:  Unremarkable as visualized.  No enlarged lymph nodes.    OTHER FINDINGS:  Some of the pannus extends outside of the  field-of-view.     IMPRESSION:  1.  Some of the pannus extends outside of the field-of-view.  2.  Fatty liver hepatomegaly.  3.  Probable right distal ureteral 6 mm density causing no significant  hydronephrosis.   []   1121 CT Cervical Spine Without Contrast  FINDINGS:    VERTEBRAE:  Loss of normal lordotic curvature that could be spasmodic.   No acute fracture.    DISCS/SPINAL CANAL/NEURAL FORAMINA:  No acute findings.  No spinal  canal stenosis.    SOFT TISSUES:  Unremarkable as visualized.     IMPRESSION:    No acute findings in the cervical spine.   []   1228 Patient choosing to sign out AMA []      ED Course User Index  [AH] Elisha Tomas PA  [] Liu Fisher, DO                                           Medical Decision Making  38-year-old female who  presents to the ED today for right-sided neck pain that radiates down into the right side of her back and difficulty urinating.  She is also had intermittent nausea and vomiting for the last couple weeks.  Her blood sugar was noted to be almost 600 at home today.  She received IV fluids as well as IV insulin.  She initially received IV Norflex for her neck pain.  Her initial blood glucose was noted to be 532.  She did have small serum acetone as well as an anion gap of 18.  The plan was to repeat her BMP around 1 PM to see if this had improved.  CT of the patient's cervical spine was unremarkable.  CT of the abdomen pelvis did show a 6 mm distal right ureteral stone with no evidence of hydronephrosis.  There is no UTI and her renal function was within normal limits.  She did receive a dose of oral pain medication and Flomax.  Prior to the patient getting her repeat labs, she advised that she needed to leave because her mother was here to take her home.  I advised her that she was not ready to be discharged yet as we were still working on her blood glucose control and to make sure there was improvement in her lab work.  She states that she felt like her blood glucose had improved and she needed to leave because her mother was her only ride.  On repeat check her blood glucose was 428 on bedside fingerstick.  She was adamant that she cannot stay to continue her treatment therefore she will sign out AGAINST MEDICAL ADVICE.    Problems Addressed:  Hyperglycemia due to diabetes mellitus: complicated acute illness or injury  Right ureteral stone: complicated acute illness or injury    Amount and/or Complexity of Data Reviewed  Labs: ordered. Decision-making details documented in ED Course.  Radiology: ordered. Decision-making details documented in ED Course.  ECG/medicine tests: ordered. Decision-making details documented in ED Course.    Risk  OTC drugs.  Prescription drug management.        Final diagnoses:   Right  ureteral stone   Hyperglycemia due to diabetes mellitus       ED Disposition  ED Disposition       ED Disposition   AMA    Condition   --    Comment   --               Eddie Latif S, APRN  [address] [facility] [address] [phone]    Schedule an appointment as soon as possible for a visit in 1 day      Milan Motley MD  [address]  [facility] [address]  [phone]    Schedule an appointment as soon as possible for a visit in 1 day           Medication List      No changes were made to your prescriptions during this visit.            Elisha Tomas, PA  11/07/23 1504

## 2023-11-08 DIAGNOSIS — D64.9 NORMOCYTIC ANEMIA: ICD-10-CM

## 2023-11-08 DIAGNOSIS — D68.51 FACTOR V LEIDEN: Primary | ICD-10-CM

## 2023-11-08 LAB
QT INTERVAL: 364 MS
QTC INTERVAL: 467 MS

## 2023-11-09 ENCOUNTER — TELEPHONE (OUTPATIENT)
Dept: FAMILY MEDICINE CLINIC | Facility: CLINIC | Age: 38
End: 2023-11-09
Payer: COMMERCIAL

## 2023-11-09 NOTE — TELEPHONE ENCOUNTER
Caller: [facility]    Relationship to patient: Other    Best call back number: [phone]     Patient is needing: CHETNA WOULD LIKE TO KNOW IF IT IS OK FOR PATIENT TO HOLD HER ELIQUIS FOR TWO DAYS IN ORDER TO SCHEDULE HER FOR A COLONOSCOPY AND EGD.

## 2023-11-12 LAB
BACTERIA SPEC AEROBE CULT: NORMAL
BACTERIA SPEC AEROBE CULT: NORMAL

## 2023-11-20 ENCOUNTER — HOSPITAL ENCOUNTER (EMERGENCY)
Facility: HOSPITAL | Age: 38
Discharge: HOME | End: 2023-11-20
Attending: STUDENT IN AN ORGANIZED HEALTH CARE EDUCATION/TRAINING PROGRAM | Admitting: STUDENT IN AN ORGANIZED HEALTH CARE EDUCATION/TRAINING PROGRAM
Payer: COMMERCIAL

## 2023-11-20 VITALS
RESPIRATION RATE: 20 BRPM | WEIGHT: 293 LBS | TEMPERATURE: 98 F | SYSTOLIC BLOOD PRESSURE: 153 MMHG | HEIGHT: 64 IN | DIASTOLIC BLOOD PRESSURE: 95 MMHG | BODY MASS INDEX: 50.02 KG/M2 | OXYGEN SATURATION: 94 % | HEART RATE: 98 BPM

## 2023-11-20 DIAGNOSIS — R11.2 NAUSEA AND VOMITING, UNSPECIFIED VOMITING TYPE: Primary | ICD-10-CM

## 2023-11-20 DIAGNOSIS — E11.65 HYPERGLYCEMIA DUE TO DIABETES MELLITUS: ICD-10-CM

## 2023-11-20 DIAGNOSIS — R10.9 CHRONIC RIGHT FLANK PAIN: ICD-10-CM

## 2023-11-20 DIAGNOSIS — G89.29 CHRONIC RIGHT FLANK PAIN: ICD-10-CM

## 2023-11-20 LAB
ALBUMIN SERPL-MCNC: 3.9 G/DL (ref 3.5–5.2)
ALBUMIN/GLOB SERPL: 1.2 G/DL
ALP SERPL-CCNC: 103 U/L (ref 39–117)
ALT SERPL W P-5'-P-CCNC: 31 U/L (ref 1–33)
ANION GAP SERPL CALCULATED.3IONS-SCNC: 15.3 MMOL/L (ref 5–15)
AST SERPL-CCNC: 45 U/L (ref 1–32)
BASOPHILS # BLD AUTO: 0.04 10*3/MM3 (ref 0–0.2)
BASOPHILS NFR BLD AUTO: 0.9 % (ref 0–1.5)
BILIRUB SERPL-MCNC: 0.3 MG/DL (ref 0–1.2)
BUN SERPL-MCNC: 10 MG/DL (ref 6–20)
BUN/CREAT SERPL: 14.3 (ref 7–25)
CALCIUM SPEC-SCNC: 9.3 MG/DL (ref 8.6–10.5)
CHLORIDE SERPL-SCNC: 98 MMOL/L (ref 98–107)
CO2 SERPL-SCNC: 20.7 MMOL/L (ref 22–29)
CREAT SERPL-MCNC: 0.7 MG/DL (ref 0.57–1)
CRP SERPL-MCNC: 2.14 MG/DL (ref 0–0.5)
DEPRECATED RDW RBC AUTO: 48.4 FL (ref 37–54)
EGFRCR SERPLBLD CKD-EPI 2021: 114.4 ML/MIN/1.73
EOSINOPHIL # BLD AUTO: 0.14 10*3/MM3 (ref 0–0.4)
EOSINOPHIL NFR BLD AUTO: 3.2 % (ref 0.3–6.2)
ERYTHROCYTE [DISTWIDTH] IN BLOOD BY AUTOMATED COUNT: 14.9 % (ref 12.3–15.4)
ERYTHROCYTE [SEDIMENTATION RATE] IN BLOOD: 33 MM/HR (ref 0–20)
GLOBULIN UR ELPH-MCNC: 3.2 GM/DL
GLUCOSE SERPL-MCNC: 450 MG/DL (ref 65–99)
HCT VFR BLD AUTO: 35.7 % (ref 34–46.6)
HGB BLD-MCNC: 11.6 G/DL (ref 12–15.9)
IMM GRANULOCYTES # BLD AUTO: 0.03 10*3/MM3 (ref 0–0.05)
IMM GRANULOCYTES NFR BLD AUTO: 0.7 % (ref 0–0.5)
LYMPHOCYTES # BLD AUTO: 1.1 10*3/MM3 (ref 0.7–3.1)
LYMPHOCYTES NFR BLD AUTO: 25.1 % (ref 19.6–45.3)
MCH RBC QN AUTO: 29.2 PG (ref 26.6–33)
MCHC RBC AUTO-ENTMCNC: 32.5 G/DL (ref 31.5–35.7)
MCV RBC AUTO: 89.9 FL (ref 79–97)
MONOCYTES # BLD AUTO: 0.32 10*3/MM3 (ref 0.1–0.9)
MONOCYTES NFR BLD AUTO: 7.3 % (ref 5–12)
NEUTROPHILS NFR BLD AUTO: 2.75 10*3/MM3 (ref 1.7–7)
NEUTROPHILS NFR BLD AUTO: 62.8 % (ref 42.7–76)
NRBC BLD AUTO-RTO: 0 /100 WBC (ref 0–0.2)
PLATELET # BLD AUTO: 209 10*3/MM3 (ref 140–450)
PMV BLD AUTO: 9.2 FL (ref 6–12)
POTASSIUM SERPL-SCNC: 5 MMOL/L (ref 3.5–5.2)
PROT SERPL-MCNC: 7.1 G/DL (ref 6–8.5)
RBC # BLD AUTO: 3.97 10*6/MM3 (ref 3.77–5.28)
SODIUM SERPL-SCNC: 134 MMOL/L (ref 136–145)
WBC NRBC COR # BLD AUTO: 4.38 10*3/MM3 (ref 3.4–10.8)

## 2023-11-20 PROCEDURE — 63710000001 INSULIN REGULAR HUMAN PER 5 UNITS: Performed by: PHYSICIAN ASSISTANT

## 2023-11-20 PROCEDURE — 36415 COLL VENOUS BLD VENIPUNCTURE: CPT

## 2023-11-20 PROCEDURE — 85652 RBC SED RATE AUTOMATED: CPT | Performed by: PHYSICIAN ASSISTANT

## 2023-11-20 PROCEDURE — 85025 COMPLETE CBC W/AUTO DIFF WBC: CPT | Performed by: PHYSICIAN ASSISTANT

## 2023-11-20 PROCEDURE — 63710000001 ONDANSETRON ODT 4 MG TABLET DISPERSIBLE: Performed by: PHYSICIAN ASSISTANT

## 2023-11-20 PROCEDURE — 99283 EMERGENCY DEPT VISIT LOW MDM: CPT

## 2023-11-20 PROCEDURE — 51798 US URINE CAPACITY MEASURE: CPT

## 2023-11-20 PROCEDURE — 80053 COMPREHEN METABOLIC PANEL: CPT | Performed by: PHYSICIAN ASSISTANT

## 2023-11-20 PROCEDURE — 86140 C-REACTIVE PROTEIN: CPT | Performed by: PHYSICIAN ASSISTANT

## 2023-11-20 RX ORDER — ONDANSETRON 4 MG/1
4 TABLET, ORALLY DISINTEGRATING ORAL ONCE
Status: COMPLETED | OUTPATIENT
Start: 2023-11-20 | End: 2023-11-20

## 2023-11-20 RX ADMIN — ONDANSETRON 4 MG: 4 TABLET, ORALLY DISINTEGRATING ORAL at 17:06

## 2023-11-20 RX ADMIN — INSULIN HUMAN 14 UNITS: 100 INJECTION, SOLUTION PARENTERAL at 17:06

## 2023-11-20 NOTE — ED NOTES
MEDICAL SCREENING:    Reason for Visit: Right flank pain, sharp stabbing pain. Hx of kidney stones. Well known to this facility        Patient initially seen in triage.  The patient was advised further evaluation and diagnostic testing will be needed, some of the treatment and testing will be initiated in the lobby in order to begin the process.  The patient will be returned to the waiting area for the time being and possibly be re-assessed by a subsequent ED provider.  The patient will be brought back to the treatment area in as timely manner as possible.      Dayron Loco PA-C  11/20/23 1441

## 2023-11-20 NOTE — ED PROVIDER NOTES
Subjective   History of Present Illness  38-year-old female who presents to the ED today for vomiting and flank pain.  She states her mother made her get on the ambulance and come here.  She states she did not want to come.  She states her mother made her come because when she was seen at [facility] yesterday her blood counts were low.  She states that she has had a lot of nausea and vomiting recently so she has not been drinking very much.  She states she is having right flank pain but it is no different than her chronic right flank pain.  She did not take her insulin today.  She also did not take her prescribed Phenergan today.  She states she has been arguing with her mother today and her mother will not come and pick her up until she sees a provider.    History provided by:  Patient  Vomiting  The primary symptoms include nausea and vomiting. Primary symptoms do not include fever, diarrhea or dysuria. The illness began today. The onset was gradual. The problem has not changed since onset.  The illness is also significant for anorexia and back pain.       Review of Systems   Constitutional:  Positive for appetite change. Negative for fever.   HENT: Negative.     Eyes: Negative.    Respiratory: Negative.     Cardiovascular: Negative.    Gastrointestinal:  Positive for anorexia, nausea and vomiting. Negative for diarrhea.   Genitourinary:  Positive for difficulty urinating and flank pain. Negative for dysuria, frequency and urgency.   Musculoskeletal:  Positive for back pain.   Skin: Negative.    Neurological: Negative.    Psychiatric/Behavioral: Negative.     All other systems reviewed and are negative.      Past Medical History:   Diagnosis Date    A-fib     Abnormal ECG     Anemia     Anxiety     Asthma     Cancer     Ovarian    Depression     Diabetes mellitus     DVT (deep venous thrombosis)     Factor 5 Leiden mutation, heterozygous     Fibroid     GERD (gastroesophageal reflux disease)     Gout      H/O abdominal abscess     History of sepsis     History of transfusion     Hyperlipidemia     Hypothyroid     Kidney stone     Migraines     Neuropathy     Ovarian cancer 2021    Ovarian cyst     PE (pulmonary embolism)     Polycystic ovary syndrome     Preeclampsia     Rh incompatibility     Stroke     TIA (transient ischemic attack)     Urinary tract infection     Varicella        Allergies   Allergen Reactions    Toradol [Ketorolac Tromethamine] Anaphylaxis and Hives    Haldol [Haloperidol] Hives and Mental Status Change    Tramadol Hives and Swelling    Amoxicillin Hives and Rash    Penicillins Hives and Rash       Past Surgical History:   Procedure Laterality Date    ABDOMINAL SURGERY      CARDIAC CATHETERIZATION       SECTION      CHOLECYSTECTOMY      COLONOSCOPY      ENDOSCOPY      EXTRACORPOREAL SHOCK WAVE LITHOTRIPSY (ESWL) Left 10/22/2021    Procedure: EXTRACORPOREAL SHOCKWAVE LITHOTRIPSY;  Surgeon: Milan Motley MD;  Location: [facility];  Service: Urology;  Laterality: Left;    LITHOTRIPSY      RIGHT OOPHORECTOMY      URETEROSCOPY LASER LITHOTRIPSY WITH STENT INSERTION Left 10/01/2021    Procedure: URETEROSCOPY WITH STENT PLACEMENT;  Surgeon: Milan Motley MD;  Location: [facility];  Service: Urology;  Laterality: Left;    URETEROSCOPY LASER LITHOTRIPSY WITH STENT INSERTION Left 2022    Procedure: URETEROSCOPY STENT REMOVAL;  Surgeon: Milan Motley MD;  Location: [facility] OR;  Service: Urology;  Laterality: Left;    URETEROSCOPY LASER LITHOTRIPSY WITH STENT INSERTION Left 2022    Procedure: CYSTOSCOPY RETROGRADE LEFT WITH STENT PLACEMENT;  Surgeon: Milan Motley MD;  Location: [facility] OR;  Service: Urology;  Laterality: Left;       Family History   Problem Relation Age of Onset    Diabetes Father     Hypertension Father     Heart attack Father     Hypertension Mother     No Known Problems Paternal Grandmother     No Known Problems Paternal  "Grandfather     No Known Problems Maternal Grandmother     No Known Problems Maternal Grandfather     No Known Problems Sister     No Known Problems Brother     No Known Problems Daughter     No Known Problems Son     No Known Problems Cousin     Rheum arthritis Neg Hx     Osteoarthritis Neg Hx     Asthma Neg Hx     Heart failure Neg Hx     Hyperlipidemia Neg Hx     Migraines Neg Hx     Rashes / Skin problems Neg Hx     Seizures Neg Hx     Stroke Neg Hx     Thyroid disease Neg Hx        Social History     Socioeconomic History    Marital status:    Tobacco Use    Smoking status: Never     Passive exposure: Never    Smokeless tobacco: Never   Vaping Use    Vaping Use: Never used   Substance and Sexual Activity    Alcohol use: No    Drug use: Never     Comment: Pt has track marks on right arm. Pt states \"It's from my INR being drawn.\"     Sexual activity: Not Currently     Partners: Male     Birth control/protection: None           Objective   Physical Exam  Vitals and nursing note reviewed.   Constitutional:       Appearance: Normal appearance. She is obese.   HENT:      Head: Normocephalic and atraumatic.      Right Ear: External ear normal.      Left Ear: External ear normal.      Nose: Nose normal.   Eyes:      Conjunctiva/sclera: Conjunctivae normal.      Pupils: Pupils are equal, round, and reactive to light.   Cardiovascular:      Rate and Rhythm: Normal rate and regular rhythm.      Pulses: Normal pulses.      Heart sounds: Normal heart sounds.   Pulmonary:      Effort: Pulmonary effort is normal.      Breath sounds: Normal breath sounds. No wheezing or rhonchi.   Chest:      Chest wall: No tenderness.   Abdominal:      General: Bowel sounds are normal.      Palpations: Abdomen is soft.      Tenderness: There is no abdominal tenderness. There is no right CVA tenderness, left CVA tenderness, guarding or rebound.   Musculoskeletal:         General: Normal range of motion.      Cervical back: Normal range " of motion and neck supple.   Skin:     General: Skin is warm and dry.      Capillary Refill: Capillary refill takes less than 2 seconds.   Neurological:      General: No focal deficit present.      Mental Status: She is alert and oriented to person, place, and time.   Psychiatric:         Mood and Affect: Affect is tearful.         Procedures       Results for orders placed or performed during the hospital encounter of 11/20/23   Comprehensive Metabolic Panel    Specimen: Blood   Result Value Ref Range    Glucose 450 (C) 65 - 99 mg/dL    BUN 10 6 - 20 mg/dL    Creatinine 0.70 0.57 - 1.00 mg/dL    Sodium 134 (L) 136 - 145 mmol/L    Potassium 5.0 3.5 - 5.2 mmol/L    Chloride 98 98 - 107 mmol/L    CO2 20.7 (L) 22.0 - 29.0 mmol/L    Calcium 9.3 8.6 - 10.5 mg/dL    Total Protein 7.1 6.0 - 8.5 g/dL    Albumin 3.9 3.5 - 5.2 g/dL    ALT (SGPT) 31 1 - 33 U/L    AST (SGOT) 45 (H) 1 - 32 U/L    Alkaline Phosphatase 103 39 - 117 U/L    Total Bilirubin 0.3 0.0 - 1.2 mg/dL    Globulin 3.2 gm/dL    A/G Ratio 1.2 g/dL    BUN/Creatinine Ratio 14.3 7.0 - 25.0    Anion Gap 15.3 (H) 5.0 - 15.0 mmol/L    eGFR 114.4 >60.0 mL/min/1.73   C-reactive Protein    Specimen: Blood   Result Value Ref Range    C-Reactive Protein 2.14 (H) 0.00 - 0.50 mg/dL   Sedimentation Rate    Specimen: Blood   Result Value Ref Range    Sed Rate 33 (H) 0 - 20 mm/hr   CBC Auto Differential    Specimen: Blood   Result Value Ref Range    WBC 4.38 3.40 - 10.80 10*3/mm3    RBC 3.97 3.77 - 5.28 10*6/mm3    Hemoglobin 11.6 (L) 12.0 - 15.9 g/dL    Hematocrit 35.7 34.0 - 46.6 %    MCV 89.9 79.0 - 97.0 fL    MCH 29.2 26.6 - 33.0 pg    MCHC 32.5 31.5 - 35.7 g/dL    RDW 14.9 12.3 - 15.4 %    RDW-SD 48.4 37.0 - 54.0 fl    MPV 9.2 6.0 - 12.0 fL    Platelets 209 140 - 450 10*3/mm3    Neutrophil % 62.8 42.7 - 76.0 %    Lymphocyte % 25.1 19.6 - 45.3 %    Monocyte % 7.3 5.0 - 12.0 %    Eosinophil % 3.2 0.3 - 6.2 %    Basophil % 0.9 0.0 - 1.5 %    Immature Grans % 0.7 (H) 0.0 -  0.5 %    Neutrophils, Absolute 2.75 1.70 - 7.00 10*3/mm3    Lymphocytes, Absolute 1.10 0.70 - 3.10 10*3/mm3    Monocytes, Absolute 0.32 0.10 - 0.90 10*3/mm3    Eosinophils, Absolute 0.14 0.00 - 0.40 10*3/mm3    Basophils, Absolute 0.04 0.00 - 0.20 10*3/mm3    Immature Grans, Absolute 0.03 0.00 - 0.05 10*3/mm3    nRBC 0.0 0.0 - 0.2 /100 WBC          ED Course                                           Medical Decision Making  38-year-old female presents to the ED today for vomiting and chronic right flank pain.  Her CBC today was unremarkable.  She was given oral Zofran and tolerated that well.  She was also given subcutaneous insulin because her blood sugar was 450 in the ED and she had not taken her insulin yet today.  She was able to urinate on her own with no difficulty.  She will be able to be discharged home to follow-up as an outpatient and will return to the ED if symptoms change or worsen.    Problems Addressed:  Chronic right flank pain: complicated acute illness or injury  Hyperglycemia due to diabetes mellitus: complicated acute illness or injury  Nausea and vomiting, unspecified vomiting type: complicated acute illness or injury    Amount and/or Complexity of Data Reviewed  Labs: ordered.    Risk  OTC drugs.  Prescription drug management.        Final diagnoses:   Nausea and vomiting, unspecified vomiting type   Chronic right flank pain   Hyperglycemia due to diabetes mellitus       ED Disposition  ED Disposition       ED Disposition   Discharge    Condition   Stable    Comment   --               Eddie Latif, APRN  [address] [facility] [address] [phone]    Call in 1 day           Medication List      No changes were made to your prescriptions during this visit.            Elisha Tomas, PA  11/20/23 6888

## 2023-11-20 NOTE — DISCHARGE INSTRUCTIONS
Continue your home medications as prescribed.  Follow-up with your family doctor in the office at the next available appointment.  Return to the ED at any time if symptoms change or worsen.

## 2023-11-24 ENCOUNTER — APPOINTMENT (OUTPATIENT)
Dept: GENERAL RADIOLOGY | Facility: HOSPITAL | Age: 38
End: 2023-11-24
Payer: COMMERCIAL

## 2023-11-24 ENCOUNTER — HOSPITAL ENCOUNTER (EMERGENCY)
Facility: HOSPITAL | Age: 38
Discharge: LEFT BEFORE DISCHARGE | End: 2023-11-24
Attending: STUDENT IN AN ORGANIZED HEALTH CARE EDUCATION/TRAINING PROGRAM
Payer: COMMERCIAL

## 2023-11-24 VITALS
SYSTOLIC BLOOD PRESSURE: 131 MMHG | WEIGHT: 293 LBS | HEIGHT: 64 IN | HEART RATE: 110 BPM | TEMPERATURE: 98.1 F | OXYGEN SATURATION: 97 % | DIASTOLIC BLOOD PRESSURE: 96 MMHG | BODY MASS INDEX: 50.02 KG/M2 | RESPIRATION RATE: 14 BRPM

## 2023-11-24 DIAGNOSIS — R73.9 HYPERGLYCEMIA: ICD-10-CM

## 2023-11-24 DIAGNOSIS — R10.9 FLANK PAIN: Primary | ICD-10-CM

## 2023-11-24 LAB
ALBUMIN SERPL-MCNC: 4.1 G/DL (ref 3.5–5.2)
ALBUMIN/GLOB SERPL: 1.2 G/DL
ALP SERPL-CCNC: 107 U/L (ref 39–117)
ALT SERPL W P-5'-P-CCNC: 30 U/L (ref 1–33)
ANION GAP SERPL CALCULATED.3IONS-SCNC: 12.7 MMOL/L (ref 5–15)
AST SERPL-CCNC: 34 U/L (ref 1–32)
BASOPHILS # BLD AUTO: 0.06 10*3/MM3 (ref 0–0.2)
BASOPHILS NFR BLD AUTO: 0.9 % (ref 0–1.5)
BILIRUB SERPL-MCNC: 0.4 MG/DL (ref 0–1.2)
BUN SERPL-MCNC: 20 MG/DL (ref 6–20)
BUN/CREAT SERPL: 23.5 (ref 7–25)
CALCIUM SPEC-SCNC: 9.5 MG/DL (ref 8.6–10.5)
CHLORIDE SERPL-SCNC: 96 MMOL/L (ref 98–107)
CO2 SERPL-SCNC: 21.3 MMOL/L (ref 22–29)
CREAT SERPL-MCNC: 0.85 MG/DL (ref 0.57–1)
DEPRECATED RDW RBC AUTO: 48.5 FL (ref 37–54)
EGFRCR SERPLBLD CKD-EPI 2021: 90.6 ML/MIN/1.73
EOSINOPHIL # BLD AUTO: 0.16 10*3/MM3 (ref 0–0.4)
EOSINOPHIL NFR BLD AUTO: 2.5 % (ref 0.3–6.2)
ERYTHROCYTE [DISTWIDTH] IN BLOOD BY AUTOMATED COUNT: 15 % (ref 12.3–15.4)
GLOBULIN UR ELPH-MCNC: 3.5 GM/DL
GLUCOSE SERPL-MCNC: 446 MG/DL (ref 65–99)
HCT VFR BLD AUTO: 38.1 % (ref 34–46.6)
HGB BLD-MCNC: 12.2 G/DL (ref 12–15.9)
HOLD SPECIMEN: NORMAL
HOLD SPECIMEN: NORMAL
IMM GRANULOCYTES # BLD AUTO: 0.02 10*3/MM3 (ref 0–0.05)
IMM GRANULOCYTES NFR BLD AUTO: 0.3 % (ref 0–0.5)
LYMPHOCYTES # BLD AUTO: 1.91 10*3/MM3 (ref 0.7–3.1)
LYMPHOCYTES NFR BLD AUTO: 29.7 % (ref 19.6–45.3)
MCH RBC QN AUTO: 28.8 PG (ref 26.6–33)
MCHC RBC AUTO-ENTMCNC: 32 G/DL (ref 31.5–35.7)
MCV RBC AUTO: 89.9 FL (ref 79–97)
MONOCYTES # BLD AUTO: 0.64 10*3/MM3 (ref 0.1–0.9)
MONOCYTES NFR BLD AUTO: 10 % (ref 5–12)
NEUTROPHILS NFR BLD AUTO: 3.64 10*3/MM3 (ref 1.7–7)
NEUTROPHILS NFR BLD AUTO: 56.6 % (ref 42.7–76)
NRBC BLD AUTO-RTO: 0 /100 WBC (ref 0–0.2)
PLATELET # BLD AUTO: 227 10*3/MM3 (ref 140–450)
PMV BLD AUTO: 9.1 FL (ref 6–12)
POTASSIUM SERPL-SCNC: 4.9 MMOL/L (ref 3.5–5.2)
PROT SERPL-MCNC: 7.6 G/DL (ref 6–8.5)
RBC # BLD AUTO: 4.24 10*6/MM3 (ref 3.77–5.28)
SODIUM SERPL-SCNC: 130 MMOL/L (ref 136–145)
WBC NRBC COR # BLD AUTO: 6.43 10*3/MM3 (ref 3.4–10.8)
WHOLE BLOOD HOLD COAG: NORMAL
WHOLE BLOOD HOLD SPECIMEN: NORMAL

## 2023-11-24 PROCEDURE — 74018 RADEX ABDOMEN 1 VIEW: CPT

## 2023-11-24 PROCEDURE — 80053 COMPREHEN METABOLIC PANEL: CPT | Performed by: NURSE PRACTITIONER

## 2023-11-24 PROCEDURE — 85025 COMPLETE CBC W/AUTO DIFF WBC: CPT | Performed by: NURSE PRACTITIONER

## 2023-11-24 PROCEDURE — 99283 EMERGENCY DEPT VISIT LOW MDM: CPT

## 2023-11-24 PROCEDURE — 36415 COLL VENOUS BLD VENIPUNCTURE: CPT

## 2023-11-24 RX ORDER — ACETAMINOPHEN 500 MG
1000 TABLET ORAL ONCE
Status: COMPLETED | OUTPATIENT
Start: 2023-11-24 | End: 2023-11-24

## 2023-11-24 RX ADMIN — ACETAMINOPHEN 1000 MG: 500 TABLET ORAL at 04:16

## 2023-11-24 NOTE — ED NOTES
"Pt request to leave AMA. Pt reported pain and I made provider aware. Provider ordered 1000mg of tylenol for pt. When telling the pt what she has for pain pt states \"if you guys aren't going to help me I just want to leave.\" I asked pt if she was sure she wanted to leave and she replied \" I want to leave AMA\". Provider made aware.   "

## 2023-11-24 NOTE — ED PROVIDER NOTES
Subjective   History of Present Illness  Patient is a 38-year-old female with significant past medical history positive for PCOS, frequent UTIs, ovarian cyst, kidney stones, hyperlipidemia, type 2 diabetes, asthma, anxiety presenting to the ER complaints of flank pain and hematuria.  Patient reports a 2-day history of severe flank pain and hematuria.  Patient denies any urinary frequency, dysuria, vaginal discharge, trauma or any additional symptoms at this time.  Patient denies any alleviating or worsening factors.    History provided by:  Patient   used: No        Review of Systems   Constitutional: Negative.  Negative for fever.   HENT: Negative.     Respiratory: Negative.     Cardiovascular: Negative.  Negative for chest pain.   Gastrointestinal: Negative.  Negative for abdominal pain.   Endocrine: Negative.    Genitourinary:  Positive for flank pain and hematuria. Negative for dysuria.   Skin: Negative.    Neurological: Negative.    Psychiatric/Behavioral: Negative.     All other systems reviewed and are negative.      Past Medical History:   Diagnosis Date    A-fib     Abnormal ECG     Anemia     Anxiety     Asthma     Cancer     Ovarian    Depression     Diabetes mellitus     DVT (deep venous thrombosis)     Factor 5 Leiden mutation, heterozygous     Fibroid     GERD (gastroesophageal reflux disease)     Gout     H/O abdominal abscess     History of sepsis     History of transfusion     Hyperlipidemia     Hypothyroid     Kidney stone     Migraines     Neuropathy     Ovarian cancer 02/2021    Ovarian cyst     PE (pulmonary embolism)     Polycystic ovary syndrome     Preeclampsia     Rh incompatibility     Stroke     TIA (transient ischemic attack)     Urinary tract infection     Varicella        Allergies   Allergen Reactions    Toradol [Ketorolac Tromethamine] Anaphylaxis and Hives    Haldol [Haloperidol] Hives and Mental Status Change    Tramadol Hives and Swelling    Amoxicillin Hives  and Rash    Penicillins Hives and Rash       Past Surgical History:   Procedure Laterality Date    ABDOMINAL SURGERY      CARDIAC CATHETERIZATION       SECTION      CHOLECYSTECTOMY      COLONOSCOPY      ENDOSCOPY      EXTRACORPOREAL SHOCK WAVE LITHOTRIPSY (ESWL) Left 10/22/2021    Procedure: EXTRACORPOREAL SHOCKWAVE LITHOTRIPSY;  Surgeon: Milan Motley MD;  Location: [facility];  Service: Urology;  Laterality: Left;    LITHOTRIPSY      RIGHT OOPHORECTOMY      URETEROSCOPY LASER LITHOTRIPSY WITH STENT INSERTION Left 10/01/2021    Procedure: URETEROSCOPY WITH STENT PLACEMENT;  Surgeon: Milan Motley MD;  Location: [facility];  Service: Urology;  Laterality: Left;    URETEROSCOPY LASER LITHOTRIPSY WITH STENT INSERTION Left 2022    Procedure: URETEROSCOPY STENT REMOVAL;  Surgeon: Milan Motley MD;  Location: [facility];  Service: Urology;  Laterality: Left;    URETEROSCOPY LASER LITHOTRIPSY WITH STENT INSERTION Left 2022    Procedure: CYSTOSCOPY RETROGRADE LEFT WITH STENT PLACEMENT;  Surgeon: Milan Motley MD;  Location: [facility];  Service: Urology;  Laterality: Left;       Family History   Problem Relation Age of Onset    Diabetes Father     Hypertension Father     Heart attack Father     Hypertension Mother     No Known Problems Paternal Grandmother     No Known Problems Paternal Grandfather     No Known Problems Maternal Grandmother     No Known Problems Maternal Grandfather     No Known Problems Sister     No Known Problems Brother     No Known Problems Daughter     No Known Problems Son     No Known Problems Cousin     Rheum arthritis Neg Hx     Osteoarthritis Neg Hx     Asthma Neg Hx     Heart failure Neg Hx     Hyperlipidemia Neg Hx     Migraines Neg Hx     Rashes / Skin problems Neg Hx     Seizures Neg Hx     Stroke Neg Hx     Thyroid disease Neg Hx        Social History     Socioeconomic History    Marital status:    Tobacco Use    Smoking status:  "Never     Passive exposure: Never    Smokeless tobacco: Never   Vaping Use    Vaping Use: Never used   Substance and Sexual Activity    Alcohol use: No    Drug use: Never     Comment: Pt has track marks on right arm. Pt states \"It's from my INR being drawn.\"     Sexual activity: Not Currently     Partners: Male     Birth control/protection: None           Objective   Physical Exam  Vitals and nursing note reviewed.   Constitutional:       General: She is not in acute distress.     Appearance: She is well-developed. She is obese. She is not diaphoretic.   HENT:      Head: Normocephalic and atraumatic.      Right Ear: External ear normal.      Left Ear: External ear normal.      Nose: Nose normal.   Eyes:      Conjunctiva/sclera: Conjunctivae normal.      Pupils: Pupils are equal, round, and reactive to light.   Neck:      Vascular: No JVD.      Trachea: No tracheal deviation.   Cardiovascular:      Rate and Rhythm: Regular rhythm. Tachycardia present.      Heart sounds: Normal heart sounds. No murmur heard.  Pulmonary:      Effort: Pulmonary effort is normal. No respiratory distress.      Breath sounds: Normal breath sounds. No wheezing.   Abdominal:      General: Bowel sounds are normal.      Palpations: Abdomen is soft.      Tenderness: There is no abdominal tenderness.   Musculoskeletal:         General: No deformity. Normal range of motion.      Cervical back: Normal range of motion and neck supple.   Skin:     General: Skin is warm and dry.      Capillary Refill: Capillary refill takes 2 to 3 seconds.      Coloration: Skin is pale.      Findings: No erythema or rash.   Neurological:      Mental Status: She is alert and oriented to person, place, and time.      Cranial Nerves: No cranial nerve deficit.   Psychiatric:         Behavior: Behavior normal.         Thought Content: Thought content normal.         Procedures       Results for orders placed or performed during the hospital encounter of 11/24/23 "   Comprehensive Metabolic Panel    Specimen: Arm, Right; Blood   Result Value Ref Range    Glucose 446 (C) 65 - 99 mg/dL    BUN 20 6 - 20 mg/dL    Creatinine 0.85 0.57 - 1.00 mg/dL    Sodium 130 (L) 136 - 145 mmol/L    Potassium 4.9 3.5 - 5.2 mmol/L    Chloride 96 (L) 98 - 107 mmol/L    CO2 21.3 (L) 22.0 - 29.0 mmol/L    Calcium 9.5 8.6 - 10.5 mg/dL    Total Protein 7.6 6.0 - 8.5 g/dL    Albumin 4.1 3.5 - 5.2 g/dL    ALT (SGPT) 30 1 - 33 U/L    AST (SGOT) 34 (H) 1 - 32 U/L    Alkaline Phosphatase 107 39 - 117 U/L    Total Bilirubin 0.4 0.0 - 1.2 mg/dL    Globulin 3.5 gm/dL    A/G Ratio 1.2 g/dL    BUN/Creatinine Ratio 23.5 7.0 - 25.0    Anion Gap 12.7 5.0 - 15.0 mmol/L    eGFR 90.6 >60.0 mL/min/1.73   CBC Auto Differential    Specimen: Arm, Right; Blood   Result Value Ref Range    WBC 6.43 3.40 - 10.80 10*3/mm3    RBC 4.24 3.77 - 5.28 10*6/mm3    Hemoglobin 12.2 12.0 - 15.9 g/dL    Hematocrit 38.1 34.0 - 46.6 %    MCV 89.9 79.0 - 97.0 fL    MCH 28.8 26.6 - 33.0 pg    MCHC 32.0 31.5 - 35.7 g/dL    RDW 15.0 12.3 - 15.4 %    RDW-SD 48.5 37.0 - 54.0 fl    MPV 9.1 6.0 - 12.0 fL    Platelets 227 140 - 450 10*3/mm3    Neutrophil % 56.6 42.7 - 76.0 %    Lymphocyte % 29.7 19.6 - 45.3 %    Monocyte % 10.0 5.0 - 12.0 %    Eosinophil % 2.5 0.3 - 6.2 %    Basophil % 0.9 0.0 - 1.5 %    Immature Grans % 0.3 0.0 - 0.5 %    Neutrophils, Absolute 3.64 1.70 - 7.00 10*3/mm3    Lymphocytes, Absolute 1.91 0.70 - 3.10 10*3/mm3    Monocytes, Absolute 0.64 0.10 - 0.90 10*3/mm3    Eosinophils, Absolute 0.16 0.00 - 0.40 10*3/mm3    Basophils, Absolute 0.06 0.00 - 0.20 10*3/mm3    Immature Grans, Absolute 0.02 0.00 - 0.05 10*3/mm3    nRBC 0.0 0.0 - 0.2 /100 WBC   Green Top (Gel)   Result Value Ref Range    Extra Tube Hold for add-ons.    Lavender Top   Result Value Ref Range    Extra Tube hold for add-on    Gold Top - SST   Result Value Ref Range    Extra Tube Hold for add-ons.    Light Blue Top   Result Value Ref Range    Extra Tube Hold for  "add-ons.       XR Abdomen KUB   Final Result       1.  Nonobstructive bowel gas pattern   2.  No free air.   3.  Possible underlying enteritis.   4.  No renal, ureter, or bladder stone identified.       This report was finalized on 11/24/2023 4:48 AM by Elian Ovalle MD.               ED Course  ED Course as of 11/25/23 2227 Fri Nov 24, 2023   0448 Pt request to leave AMA. Pt reported pain and I made provider aware. Provider ordered 1000mg of tylenol for pt. When telling the pt what she has for pain pt states \"if you guys aren't going to help me I just want to leave.\" I asked pt if she was sure she wanted to leave and she replied \" I want to leave AMA\". [SM]   9045 Discussed risk and benefit and glucose level with patient. She verbalized understanding and still requesting to sign out AGAINST MEDICAL ADVICE.  Patient is alert and oriented x4.  Patient states that if she does not get anything other than Tylenol for pain that she was leaving AGAINST MEDICAL ADVICE. [SM]      ED Course User Index  [SM] Rajani Zepeda, YEIMI                                           Medical Decision Making  Patient is a 38-year-old female with significant past medical history positive for PCOS, frequent UTIs, ovarian cyst, kidney stones, hyperlipidemia, type 2 diabetes, asthma, anxiety presenting to the ER complaints of flank pain and hematuria.  Patient reports a 2-day history of severe flank pain and hematuria.  Patient denies any urinary frequency, dysuria, vaginal discharge, trauma or any additional symptoms at this time.  Patient denies any alleviating or worsening factors.    Problems Addressed:  Flank pain: complicated acute illness or injury  Hyperglycemia: complicated acute illness or injury    Amount and/or Complexity of Data Reviewed  Labs: ordered.  Radiology: ordered. Decision-making details documented in ED Course.    Risk  OTC drugs.        Final diagnoses:   Flank pain   Hyperglycemia       ED Disposition  ED " Disposition       ED Disposition   AMA    Condition   --    Comment   --               No follow-up provider specified.       Medication List      No changes were made to your prescriptions during this visit.            Rajani Zepeda, APRN  11/25/23 6413

## 2023-11-26 ENCOUNTER — HOSPITAL ENCOUNTER (EMERGENCY)
Facility: HOSPITAL | Age: 38
Discharge: HOME | End: 2023-11-26
Attending: STUDENT IN AN ORGANIZED HEALTH CARE EDUCATION/TRAINING PROGRAM
Payer: COMMERCIAL

## 2023-11-26 ENCOUNTER — APPOINTMENT (OUTPATIENT)
Dept: CT IMAGING | Facility: HOSPITAL | Age: 38
End: 2023-11-26
Payer: COMMERCIAL

## 2023-11-26 VITALS
OXYGEN SATURATION: 98 % | RESPIRATION RATE: 16 BRPM | WEIGHT: 293 LBS | DIASTOLIC BLOOD PRESSURE: 97 MMHG | HEIGHT: 64 IN | TEMPERATURE: 98.3 F | BODY MASS INDEX: 50.02 KG/M2 | HEART RATE: 95 BPM | SYSTOLIC BLOOD PRESSURE: 152 MMHG

## 2023-11-26 DIAGNOSIS — E11.65 TYPE 2 DIABETES MELLITUS WITH HYPERGLYCEMIA, WITH LONG-TERM CURRENT USE OF INSULIN: Primary | ICD-10-CM

## 2023-11-26 DIAGNOSIS — K59.00 CONSTIPATION, UNSPECIFIED CONSTIPATION TYPE: ICD-10-CM

## 2023-11-26 DIAGNOSIS — Z91.199 NONCOMPLIANCE WITH DIET AND MEDICATION REGIMEN: ICD-10-CM

## 2023-11-26 DIAGNOSIS — Z91.148 NONCOMPLIANCE WITH DIET AND MEDICATION REGIMEN: ICD-10-CM

## 2023-11-26 DIAGNOSIS — Z79.4 TYPE 2 DIABETES MELLITUS WITH HYPERGLYCEMIA, WITH LONG-TERM CURRENT USE OF INSULIN: Primary | ICD-10-CM

## 2023-11-26 LAB
ACETONE BLD QL: NEGATIVE
ALBUMIN SERPL-MCNC: 3.8 G/DL (ref 3.5–5.2)
ALBUMIN/GLOB SERPL: 1.2 G/DL
ALP SERPL-CCNC: 97 U/L (ref 39–117)
ALT SERPL W P-5'-P-CCNC: 31 U/L (ref 1–33)
ANION GAP SERPL CALCULATED.3IONS-SCNC: 17.1 MMOL/L (ref 5–15)
AST SERPL-CCNC: 57 U/L (ref 1–32)
BACTERIA UR QL AUTO: ABNORMAL /HPF
BASOPHILS # BLD AUTO: 0.05 10*3/MM3 (ref 0–0.2)
BASOPHILS NFR BLD AUTO: 0.8 % (ref 0–1.5)
BILIRUB SERPL-MCNC: 0.4 MG/DL (ref 0–1.2)
BILIRUB UR QL STRIP: NEGATIVE
BUN SERPL-MCNC: 13 MG/DL (ref 6–20)
BUN/CREAT SERPL: 19.1 (ref 7–25)
CALCIUM SPEC-SCNC: 8.9 MG/DL (ref 8.6–10.5)
CHLORIDE SERPL-SCNC: 99 MMOL/L (ref 98–107)
CLARITY UR: ABNORMAL
CO2 SERPL-SCNC: 15.9 MMOL/L (ref 22–29)
COLOR UR: YELLOW
CREAT SERPL-MCNC: 0.68 MG/DL (ref 0.57–1)
D-LACTATE SERPL-SCNC: 4.4 MMOL/L (ref 0.5–2)
DEPRECATED RDW RBC AUTO: 50.9 FL (ref 37–54)
EGFRCR SERPLBLD CKD-EPI 2021: 115.2 ML/MIN/1.73
EOSINOPHIL # BLD AUTO: 0.18 10*3/MM3 (ref 0–0.4)
EOSINOPHIL NFR BLD AUTO: 2.9 % (ref 0.3–6.2)
ERYTHROCYTE [DISTWIDTH] IN BLOOD BY AUTOMATED COUNT: 15.1 % (ref 12.3–15.4)
GLOBULIN UR ELPH-MCNC: 3.2 GM/DL
GLUCOSE SERPL-MCNC: 358 MG/DL (ref 65–99)
GLUCOSE UR STRIP-MCNC: ABNORMAL MG/DL
HCT VFR BLD AUTO: 38.4 % (ref 34–46.6)
HGB BLD-MCNC: 11.9 G/DL (ref 12–15.9)
HGB UR QL STRIP.AUTO: ABNORMAL
HOLD SPECIMEN: NORMAL
HOLD SPECIMEN: NORMAL
HYALINE CASTS UR QL AUTO: ABNORMAL /LPF
IMM GRANULOCYTES # BLD AUTO: 0.01 10*3/MM3 (ref 0–0.05)
IMM GRANULOCYTES NFR BLD AUTO: 0.2 % (ref 0–0.5)
KETONES UR QL STRIP: NEGATIVE
LEUKOCYTE ESTERASE UR QL STRIP.AUTO: NEGATIVE
LYMPHOCYTES # BLD AUTO: 1.65 10*3/MM3 (ref 0.7–3.1)
LYMPHOCYTES NFR BLD AUTO: 26.6 % (ref 19.6–45.3)
MCH RBC QN AUTO: 28.7 PG (ref 26.6–33)
MCHC RBC AUTO-ENTMCNC: 31 G/DL (ref 31.5–35.7)
MCV RBC AUTO: 92.5 FL (ref 79–97)
MONOCYTES # BLD AUTO: 0.53 10*3/MM3 (ref 0.1–0.9)
MONOCYTES NFR BLD AUTO: 8.5 % (ref 5–12)
NEUTROPHILS NFR BLD AUTO: 3.79 10*3/MM3 (ref 1.7–7)
NEUTROPHILS NFR BLD AUTO: 61 % (ref 42.7–76)
NITRITE UR QL STRIP: NEGATIVE
NRBC BLD AUTO-RTO: 0 /100 WBC (ref 0–0.2)
PH UR STRIP.AUTO: <=5 [PH] (ref 5–8)
PLATELET # BLD AUTO: 196 10*3/MM3 (ref 140–450)
PMV BLD AUTO: 9 FL (ref 6–12)
POTASSIUM SERPL-SCNC: 4.3 MMOL/L (ref 3.5–5.2)
PROT SERPL-MCNC: 7 G/DL (ref 6–8.5)
PROT UR QL STRIP: ABNORMAL
RBC # BLD AUTO: 4.15 10*6/MM3 (ref 3.77–5.28)
RBC # UR STRIP: ABNORMAL /HPF
REF LAB TEST METHOD: ABNORMAL
SODIUM SERPL-SCNC: 132 MMOL/L (ref 136–145)
SP GR UR STRIP: 1.02 (ref 1–1.03)
SQUAMOUS #/AREA URNS HPF: ABNORMAL /HPF
UROBILINOGEN UR QL STRIP: ABNORMAL
WBC # UR STRIP: ABNORMAL /HPF
WBC NRBC COR # BLD AUTO: 6.21 10*3/MM3 (ref 3.4–10.8)
WHOLE BLOOD HOLD COAG: NORMAL
WHOLE BLOOD HOLD SPECIMEN: NORMAL

## 2023-11-26 PROCEDURE — 81001 URINALYSIS AUTO W/SCOPE: CPT | Performed by: STUDENT IN AN ORGANIZED HEALTH CARE EDUCATION/TRAINING PROGRAM

## 2023-11-26 PROCEDURE — 85025 COMPLETE CBC W/AUTO DIFF WBC: CPT | Performed by: STUDENT IN AN ORGANIZED HEALTH CARE EDUCATION/TRAINING PROGRAM

## 2023-11-26 PROCEDURE — 80053 COMPREHEN METABOLIC PANEL: CPT | Performed by: STUDENT IN AN ORGANIZED HEALTH CARE EDUCATION/TRAINING PROGRAM

## 2023-11-26 PROCEDURE — 83605 ASSAY OF LACTIC ACID: CPT | Performed by: STUDENT IN AN ORGANIZED HEALTH CARE EDUCATION/TRAINING PROGRAM

## 2023-11-26 PROCEDURE — 36415 COLL VENOUS BLD VENIPUNCTURE: CPT

## 2023-11-26 PROCEDURE — 82009 KETONE BODYS QUAL: CPT | Performed by: STUDENT IN AN ORGANIZED HEALTH CARE EDUCATION/TRAINING PROGRAM

## 2023-11-26 PROCEDURE — 99284 EMERGENCY DEPT VISIT MOD MDM: CPT

## 2023-11-26 PROCEDURE — 74176 CT ABD & PELVIS W/O CONTRAST: CPT

## 2023-11-26 PROCEDURE — 63710000001 INSULIN REGULAR HUMAN PER 5 UNITS: Performed by: STUDENT IN AN ORGANIZED HEALTH CARE EDUCATION/TRAINING PROGRAM

## 2023-11-26 RX ORDER — SODIUM CHLORIDE 0.9 % (FLUSH) 0.9 %
10 SYRINGE (ML) INJECTION AS NEEDED
Status: DISCONTINUED | OUTPATIENT
Start: 2023-11-26 | End: 2023-11-26 | Stop reason: HOSPADM

## 2023-11-26 RX ADMIN — INSULIN HUMAN 5 UNITS: 100 INJECTION, SOLUTION PARENTERAL at 16:29

## 2023-11-26 NOTE — ED PROVIDER NOTES
Subjective   History of Present Illness  38-year-old female who is morbidly obese presents with vague complaints of generalized lower abdominal pain that started yesterday.  Does state that she has had a history of kidney stones and states that she feels like she might have recently developed another 1 or is working on passing 1 at this time.      Review of Systems    Past Medical History:   Diagnosis Date   • A-fib    • Abnormal ECG    • Anemia    • Anxiety    • Asthma    • Cancer     Ovarian   • Depression    • Diabetes mellitus    • DVT (deep venous thrombosis)    • Factor 5 Leiden mutation, heterozygous    • Fibroid    • GERD (gastroesophageal reflux disease)    • Gout    • H/O abdominal abscess    • History of sepsis    • History of transfusion    • Hyperlipidemia    • Hypothyroid    • Kidney stone    • Migraines    • Neuropathy    • Ovarian cancer 2021   • Ovarian cyst    • PE (pulmonary embolism)    • Polycystic ovary syndrome    • Preeclampsia    • Rh incompatibility    • Stroke    • TIA (transient ischemic attack)    • Urinary tract infection    • Varicella        Allergies   Allergen Reactions   • Toradol [Ketorolac Tromethamine] Anaphylaxis and Hives   • Haldol [Haloperidol] Hives and Mental Status Change   • Tramadol Hives and Swelling   • Amoxicillin Hives and Rash   • Penicillins Hives and Rash       Past Surgical History:   Procedure Laterality Date   • ABDOMINAL SURGERY     • CARDIAC CATHETERIZATION     •  SECTION     • CHOLECYSTECTOMY     • COLONOSCOPY     • ENDOSCOPY     • EXTRACORPOREAL SHOCK WAVE LITHOTRIPSY (ESWL) Left 10/22/2021    Procedure: EXTRACORPOREAL SHOCKWAVE LITHOTRIPSY;  Surgeon: Milan Motley MD;  Location: [facility];  Service: Urology;  Laterality: Left;   • LITHOTRIPSY     • RIGHT OOPHORECTOMY     • URETEROSCOPY LASER LITHOTRIPSY WITH STENT INSERTION Left 10/01/2021    Procedure: URETEROSCOPY WITH STENT PLACEMENT;  Surgeon: Milan Motley MD;   "Location: [facility] OR;  Service: Urology;  Laterality: Left;   • URETEROSCOPY LASER LITHOTRIPSY WITH STENT INSERTION Left 4/27/2022    Procedure: URETEROSCOPY STENT REMOVAL;  Surgeon: Milan Motley MD;  Location: [facility] OR;  Service: Urology;  Laterality: Left;   • URETEROSCOPY LASER LITHOTRIPSY WITH STENT INSERTION Left 6/29/2022    Procedure: CYSTOSCOPY RETROGRADE LEFT WITH STENT PLACEMENT;  Surgeon: Milan Motley MD;  Location: [facility] OR;  Service: Urology;  Laterality: Left;       Family History   Problem Relation Age of Onset   • Diabetes Father    • Hypertension Father    • Heart attack Father    • Hypertension Mother    • No Known Problems Paternal Grandmother    • No Known Problems Paternal Grandfather    • No Known Problems Maternal Grandmother    • No Known Problems Maternal Grandfather    • No Known Problems Sister    • No Known Problems Brother    • No Known Problems Daughter    • No Known Problems Son    • No Known Problems Cousin    • Rheum arthritis Neg Hx    • Osteoarthritis Neg Hx    • Asthma Neg Hx    • Heart failure Neg Hx    • Hyperlipidemia Neg Hx    • Migraines Neg Hx    • Rashes / Skin problems Neg Hx    • Seizures Neg Hx    • Stroke Neg Hx    • Thyroid disease Neg Hx        Social History     Socioeconomic History   • Marital status:    Tobacco Use   • Smoking status: Never     Passive exposure: Never   • Smokeless tobacco: Never   Vaping Use   • Vaping Use: Never used   Substance and Sexual Activity   • Alcohol use: No   • Drug use: Never     Comment: Pt has track marks on right arm. Pt states \"It's from my INR being drawn.\"    • Sexual activity: Not Currently     Partners: Male     Birth control/protection: None           Objective   Physical Exam    Procedures           ED Course  ED Course as of 11/26/23 1604   Sun Nov 26, 2023   1558 Patient is chronically noncompliant with her medication regiment.  She is chronically pain seeking.  There is no indication to " give her pain medicine.  CT shows no acute pathology.  She will be given a liter of fluid and follow IV insulin with plans to discharge home. [LK]      ED Course User Index  [LK] Sherry Reddy, DO                                           Medical Decision Making  Amount and/or Complexity of Data Reviewed  Labs: ordered.  Radiology: ordered.    Risk  Prescription drug management.        Final diagnoses:   None       ED Disposition  ED Disposition       None            No follow-up provider specified.       Medication List      No changes were made to your prescriptions during this visit.          If Indicated - Urine, Clean Catch    Specimen: Urine, Clean Catch   Result Value Ref Range    Color, UA Yellow Yellow, Straw    Appearance, UA Cloudy (A) Clear    pH, UA <=5.0 5.0 - 8.0    Specific Gravity, UA 1.022 1.005 - 1.030    Glucose,  mg/dL (2+) (A) Negative    Ketones, UA Negative Negative    Bilirubin, UA Negative Negative    Blood, UA Large (3+) (A) Negative    Protein, UA 30 mg/dL (1+) (A) Negative    Leuk Esterase, UA Negative Negative    Nitrite, UA Negative Negative    Urobilinogen, UA 0.2 E.U./dL 0.2 - 1.0 E.U./dL   Lactic Acid, Plasma    Specimen: Arm, Left; Blood   Result Value Ref Range    Lactate 4.4 (C) 0.5 - 2.0 mmol/L   CBC Auto Differential    Specimen: Arm, Left; Blood   Result Value Ref Range    WBC 6.21 3.40 - 10.80 10*3/mm3    RBC 4.15 3.77 - 5.28 10*6/mm3    Hemoglobin 11.9 (L) 12.0 - 15.9 g/dL    Hematocrit 38.4 34.0 - 46.6 %    MCV 92.5 79.0 - 97.0 fL    MCH 28.7 26.6 - 33.0 pg    MCHC 31.0 (L) 31.5 - 35.7 g/dL    RDW 15.1 12.3 - 15.4 %    RDW-SD 50.9 37.0 - 54.0 fl    MPV 9.0 6.0 - 12.0 fL    Platelets 196 140 - 450 10*3/mm3    Neutrophil % 61.0 42.7 - 76.0 %    Lymphocyte % 26.6 19.6 - 45.3 %    Monocyte % 8.5 5.0 - 12.0 %    Eosinophil % 2.9 0.3 - 6.2 %    Basophil % 0.8 0.0 - 1.5 %    Immature Grans % 0.2 0.0 - 0.5 %    Neutrophils, Absolute 3.79 1.70 - 7.00 10*3/mm3    Lymphocytes, Absolute 1.65 0.70 - 3.10 10*3/mm3    Monocytes, Absolute 0.53 0.10 - 0.90 10*3/mm3    Eosinophils, Absolute 0.18 0.00 - 0.40 10*3/mm3    Basophils, Absolute 0.05 0.00 - 0.20 10*3/mm3    Immature Grans, Absolute 0.01 0.00 - 0.05 10*3/mm3    nRBC 0.0 0.0 - 0.2 /100 WBC   Acetone    Specimen: Arm, Left; Blood   Result Value Ref Range    Acetone Negative Negative   Urinalysis, Microscopic Only - Urine, Clean Catch    Specimen: Urine, Clean Catch   Result Value Ref Range    RBC, UA Too Numerous to Count (A) None Seen, 0-2 /HPF    WBC, UA 3-5 (A) None Seen, 0-2 /HPF    Bacteria, UA None Seen None Seen  /HPF    Squamous Epithelial Cells, UA 3-6 (A) None Seen, 0-2 /HPF    Hyaline Casts, UA None Seen None Seen /LPF    Methodology Automated Microscopy    Green Top (Gel)   Result Value Ref Range    Extra Tube Hold for add-ons.    Lavender Top   Result Value Ref Range    Extra Tube hold for add-on    Gold Top - SST   Result Value Ref Range    Extra Tube Hold for add-ons.    Light Blue Top   Result Value Ref Range    Extra Tube Hold for add-ons.          ED Course  ED Course as of 12/10/23 1147   Sun Nov 26, 2023   1558 Patient is chronically noncompliant with her medication regiment.  She is chronically pain seeking.  There is no indication to give her pain medicine.  CT shows no acute pathology.  She will be given a liter of fluid and follow IV insulin with plans to discharge home. [LK]   1639 Patient was persistent and continued to press various nursing staff for pain medication.  Once again I reiterated there is no clinical indication for her to have pain medicine and nursing staff in the ER are well familiar with this patient that she is a chronic pain seeker and routinely noncompliant with her medicine on a daily basis.   [LK]      ED Course User Index  [LK] Sherry Reddy DO                                           Medical Decision Making  Problems Addressed:  Constipation, unspecified constipation type: complicated acute illness or injury  Noncompliance with diet and medication regimen: complicated acute illness or injury  Type 2 diabetes mellitus with hyperglycemia, with long-term current use of insulin: complicated acute illness or injury    Amount and/or Complexity of Data Reviewed  Labs: ordered.  Radiology: ordered.    Risk  OTC drugs.  Prescription drug management.        Final diagnoses:   Constipation, unspecified constipation type   Noncompliance with diet and medication regimen   Type 2 diabetes mellitus with hyperglycemia, with long-term current use of insulin       ED Disposition  ED Disposition        ED Disposition   Discharge    Condition   Stable    Comment   --               Eddie Latif, APRN  [address] [facility] [address]  [phone]               Medication List      No changes were made to your prescriptions during this visit.            Sherry Reddy DO  12/10/23 1148

## 2023-11-28 ENCOUNTER — PATIENT OUTREACH (OUTPATIENT)
Dept: CASE MANAGEMENT | Facility: OTHER | Age: 38
End: 2023-11-28
Payer: COMMERCIAL

## 2023-11-28 DIAGNOSIS — D64.9 NORMOCYTIC ANEMIA: ICD-10-CM

## 2023-11-28 DIAGNOSIS — D68.51 FACTOR V LEIDEN: Primary | ICD-10-CM

## 2023-11-28 NOTE — OUTREACH NOTE
Patient Outreach    SW sent pt an [facility] inquiry about ability to call with the resource information pt has expressed interest in. SW will F/u after pt responds.     Hui MEDINA -   Ambulatory Case Management    11/28/2023, 11:54 EST

## 2023-11-30 DIAGNOSIS — Z79.4 TYPE 2 DIABETES MELLITUS WITH DIABETIC NEUROPATHY, WITH LONG-TERM CURRENT USE OF INSULIN: Chronic | ICD-10-CM

## 2023-11-30 DIAGNOSIS — E11.40 TYPE 2 DIABETES MELLITUS WITH DIABETIC NEUROPATHY, WITH LONG-TERM CURRENT USE OF INSULIN: Chronic | ICD-10-CM

## 2023-11-30 NOTE — TELEPHONE ENCOUNTER
Caller: Natalia Arzate    Relationship: Self    Best call back number: [phone]     Requested Prescriptions:   Requested Prescriptions     Pending Prescriptions Disp Refills    gabapentin (NEURONTIN) 300 MG capsule 30 capsule 0     Sig: Take 1 capsule by mouth At Night As Needed (neuropathy) for up to 30 days.        Pharmacy where request should be sent: [facility] [id number] - [place], [place] - [address] AT [facility] [address] [facility] [phone] [facility] [phone] FX     Last office visit with prescribing clinician: 10/6/2023   Last telemedicine visit with prescribing clinician: Visit date not found   Next office visit with prescribing clinician: Visit date not found     Additional details provided by patient: PATIENT WOULD ALSO LIKE TO REQUEST A COUGH SYRUP AS WELL.    Does the patient have less than a 3 day supply:  [x] Yes  [] No    Would you like a call back once the refill request has been completed: [] Yes [x] No    If the office needs to give you a call back, can they leave a voicemail: [] Yes [x] No    Juan Zarate Rep   11/30/23 11:54 EST

## 2023-12-01 ENCOUNTER — PATIENT OUTREACH (OUTPATIENT)
Dept: CASE MANAGEMENT | Facility: OTHER | Age: 38
End: 2023-12-01
Payer: COMMERCIAL

## 2023-12-01 RX ORDER — GABAPENTIN 300 MG/1
300 CAPSULE ORAL NIGHTLY PRN
Qty: 30 CAPSULE | Refills: 0 | Status: SHIPPED | OUTPATIENT
Start: 2023-12-01 | End: 2023-12-31

## 2023-12-01 NOTE — OUTREACH NOTE
UTRx2. SW has continued to not get a response from pt regarding the resource information she previously requested. SW has left a message with pt on My Chart and she has no responded. SW will attempt to call next week.     Hui MEDINA -   Ambulatory Case Management    12/1/2023, 12:47 EST

## 2023-12-29 DIAGNOSIS — E11.40 TYPE 2 DIABETES MELLITUS WITH DIABETIC NEUROPATHY, WITH LONG-TERM CURRENT USE OF INSULIN: Chronic | ICD-10-CM

## 2023-12-29 DIAGNOSIS — Z79.4 TYPE 2 DIABETES MELLITUS WITH DIABETIC NEUROPATHY, WITH LONG-TERM CURRENT USE OF INSULIN: Chronic | ICD-10-CM

## 2024-01-02 ENCOUNTER — HOSPITAL ENCOUNTER (EMERGENCY)
Facility: HOSPITAL | Age: 39
Discharge: HOME | End: 2024-01-02
Attending: STUDENT IN AN ORGANIZED HEALTH CARE EDUCATION/TRAINING PROGRAM
Payer: COMMERCIAL

## 2024-01-02 ENCOUNTER — APPOINTMENT (OUTPATIENT)
Dept: CT IMAGING | Facility: HOSPITAL | Age: 39
End: 2024-01-02
Payer: COMMERCIAL

## 2024-01-02 VITALS
HEART RATE: 100 BPM | BODY MASS INDEX: 55.32 KG/M2 | WEIGHT: 293 LBS | SYSTOLIC BLOOD PRESSURE: 145 MMHG | RESPIRATION RATE: 18 BRPM | TEMPERATURE: 98.7 F | HEIGHT: 61 IN | OXYGEN SATURATION: 95 % | DIASTOLIC BLOOD PRESSURE: 98 MMHG

## 2024-01-02 DIAGNOSIS — R10.9 RIGHT FLANK PAIN: Primary | ICD-10-CM

## 2024-01-02 LAB
ALBUMIN SERPL-MCNC: 4.1 G/DL (ref 3.5–5.2)
ALBUMIN/GLOB SERPL: 1.1 G/DL
ALP SERPL-CCNC: 118 U/L (ref 39–117)
ALT SERPL W P-5'-P-CCNC: 31 U/L (ref 1–33)
ANION GAP SERPL CALCULATED.3IONS-SCNC: 14.2 MMOL/L (ref 5–15)
AST SERPL-CCNC: 45 U/L (ref 1–32)
BACTERIA UR QL AUTO: ABNORMAL /HPF
BASOPHILS # BLD AUTO: 0.05 10*3/MM3 (ref 0–0.2)
BASOPHILS NFR BLD AUTO: 0.9 % (ref 0–1.5)
BILIRUB SERPL-MCNC: 0.4 MG/DL (ref 0–1.2)
BILIRUB UR QL STRIP: NEGATIVE
BUN SERPL-MCNC: 12 MG/DL (ref 6–20)
BUN/CREAT SERPL: 14.1 (ref 7–25)
CALCIUM SPEC-SCNC: 9.5 MG/DL (ref 8.6–10.5)
CHLORIDE SERPL-SCNC: 98 MMOL/L (ref 98–107)
CLARITY UR: CLEAR
CO2 SERPL-SCNC: 21.8 MMOL/L (ref 22–29)
COLOR UR: YELLOW
CREAT SERPL-MCNC: 0.85 MG/DL (ref 0.57–1)
CRP SERPL-MCNC: 1.74 MG/DL (ref 0–0.5)
DEPRECATED RDW RBC AUTO: 45.3 FL (ref 37–54)
EGFRCR SERPLBLD CKD-EPI 2021: 90.6 ML/MIN/1.73
EOSINOPHIL # BLD AUTO: 0.14 10*3/MM3 (ref 0–0.4)
EOSINOPHIL NFR BLD AUTO: 2.5 % (ref 0.3–6.2)
ERYTHROCYTE [DISTWIDTH] IN BLOOD BY AUTOMATED COUNT: 14.6 % (ref 12.3–15.4)
GLOBULIN UR ELPH-MCNC: 3.6 GM/DL
GLUCOSE SERPL-MCNC: 468 MG/DL (ref 65–99)
GLUCOSE UR STRIP-MCNC: ABNORMAL MG/DL
HCT VFR BLD AUTO: 36.7 % (ref 34–46.6)
HGB BLD-MCNC: 12.3 G/DL (ref 12–15.9)
HGB UR QL STRIP.AUTO: NEGATIVE
HOLD SPECIMEN: NORMAL
HOLD SPECIMEN: NORMAL
HYALINE CASTS UR QL AUTO: ABNORMAL /LPF
IMM GRANULOCYTES # BLD AUTO: 0.02 10*3/MM3 (ref 0–0.05)
IMM GRANULOCYTES NFR BLD AUTO: 0.4 % (ref 0–0.5)
KETONES UR QL STRIP: ABNORMAL
LEUKOCYTE ESTERASE UR QL STRIP.AUTO: NEGATIVE
LYMPHOCYTES # BLD AUTO: 1.51 10*3/MM3 (ref 0.7–3.1)
LYMPHOCYTES NFR BLD AUTO: 26.9 % (ref 19.6–45.3)
MCH RBC QN AUTO: 29 PG (ref 26.6–33)
MCHC RBC AUTO-ENTMCNC: 33.5 G/DL (ref 31.5–35.7)
MCV RBC AUTO: 86.6 FL (ref 79–97)
MONOCYTES # BLD AUTO: 0.46 10*3/MM3 (ref 0.1–0.9)
MONOCYTES NFR BLD AUTO: 8.2 % (ref 5–12)
NEUTROPHILS NFR BLD AUTO: 3.43 10*3/MM3 (ref 1.7–7)
NEUTROPHILS NFR BLD AUTO: 61.1 % (ref 42.7–76)
NITRITE UR QL STRIP: NEGATIVE
NRBC BLD AUTO-RTO: 0 /100 WBC (ref 0–0.2)
PH UR STRIP.AUTO: 7 [PH] (ref 5–8)
PLATELET # BLD AUTO: 181 10*3/MM3 (ref 140–450)
PMV BLD AUTO: 9.1 FL (ref 6–12)
POTASSIUM SERPL-SCNC: 4.1 MMOL/L (ref 3.5–5.2)
PROT SERPL-MCNC: 7.7 G/DL (ref 6–8.5)
PROT UR QL STRIP: ABNORMAL
RBC # BLD AUTO: 4.24 10*6/MM3 (ref 3.77–5.28)
RBC # UR STRIP: ABNORMAL /HPF
REF LAB TEST METHOD: ABNORMAL
SODIUM SERPL-SCNC: 134 MMOL/L (ref 136–145)
SP GR UR STRIP: >1.03 (ref 1–1.03)
SQUAMOUS #/AREA URNS HPF: ABNORMAL /HPF
UROBILINOGEN UR QL STRIP: ABNORMAL
WBC # UR STRIP: ABNORMAL /HPF
WBC NRBC COR # BLD AUTO: 5.61 10*3/MM3 (ref 3.4–10.8)
WHOLE BLOOD HOLD COAG: NORMAL
WHOLE BLOOD HOLD SPECIMEN: NORMAL

## 2024-01-02 PROCEDURE — 74176 CT ABD & PELVIS W/O CONTRAST: CPT

## 2024-01-02 PROCEDURE — 96372 THER/PROPH/DIAG INJ SC/IM: CPT

## 2024-01-02 PROCEDURE — 99284 EMERGENCY DEPT VISIT MOD MDM: CPT

## 2024-01-02 PROCEDURE — 80053 COMPREHEN METABOLIC PANEL: CPT | Performed by: PHYSICIAN ASSISTANT

## 2024-01-02 PROCEDURE — 86140 C-REACTIVE PROTEIN: CPT | Performed by: PHYSICIAN ASSISTANT

## 2024-01-02 PROCEDURE — 25010000002 ORPHENADRINE CITRATE PER 60 MG: Performed by: PHYSICIAN ASSISTANT

## 2024-01-02 PROCEDURE — 36415 COLL VENOUS BLD VENIPUNCTURE: CPT

## 2024-01-02 PROCEDURE — 85025 COMPLETE CBC W/AUTO DIFF WBC: CPT | Performed by: PHYSICIAN ASSISTANT

## 2024-01-02 PROCEDURE — 81001 URINALYSIS AUTO W/SCOPE: CPT | Performed by: PHYSICIAN ASSISTANT

## 2024-01-02 RX ORDER — TIZANIDINE 4 MG/1
4 TABLET ORAL EVERY 8 HOURS PRN
Qty: 30 TABLET | Refills: 0 | Status: SHIPPED | OUTPATIENT
Start: 2024-01-02

## 2024-01-02 RX ORDER — ORPHENADRINE CITRATE 30 MG/ML
60 INJECTION INTRAMUSCULAR; INTRAVENOUS ONCE
Status: COMPLETED | OUTPATIENT
Start: 2024-01-02 | End: 2024-01-02

## 2024-01-02 RX ORDER — GABAPENTIN 300 MG/1
CAPSULE ORAL
Qty: 30 CAPSULE | Refills: 0 | Status: SHIPPED | OUTPATIENT
Start: 2024-01-02

## 2024-01-02 RX ORDER — HYDROCODONE BITARTRATE AND ACETAMINOPHEN 7.5; 325 MG/1; MG/1
1 TABLET ORAL ONCE
Status: COMPLETED | OUTPATIENT
Start: 2024-01-02 | End: 2024-01-02

## 2024-01-02 RX ADMIN — HYDROCODONE BITARTRATE AND ACETAMINOPHEN 1 TABLET: 7.5; 325 TABLET ORAL at 02:42

## 2024-01-02 RX ADMIN — ORPHENADRINE CITRATE 60 MG: 60 INJECTION INTRAMUSCULAR; INTRAVENOUS at 05:35

## 2024-01-29 DIAGNOSIS — Z79.4 TYPE 2 DIABETES MELLITUS WITH DIABETIC NEUROPATHY, WITH LONG-TERM CURRENT USE OF INSULIN: Chronic | ICD-10-CM

## 2024-01-29 DIAGNOSIS — E11.40 TYPE 2 DIABETES MELLITUS WITH DIABETIC NEUROPATHY, WITH LONG-TERM CURRENT USE OF INSULIN: Chronic | ICD-10-CM

## 2024-01-29 RX ORDER — GABAPENTIN 300 MG/1
CAPSULE ORAL
Qty: 30 CAPSULE | OUTPATIENT
Start: 2024-01-29

## 2024-01-31 NOTE — ED PROVIDER NOTES
Subjective     History provided by:  Patient  Flank Pain  Pain location:  R flank  Pain quality: stabbing    Pain radiates to:  Does not radiate  Pain severity:  Moderate  Onset quality:  Sudden  Duration:  1 day  Timing:  Constant  Progression:  Worsening  Chronicity:  Chronic  Relieved by:  Nothing  Associated symptoms: no chest pain, no dysuria and no fever    Risk factors: obesity        Review of Systems   Constitutional: Negative.  Negative for fever.   HENT: Negative.     Respiratory: Negative.     Cardiovascular: Negative.  Negative for chest pain.   Gastrointestinal: Negative.  Negative for abdominal pain.   Endocrine: Negative.    Genitourinary:  Positive for flank pain. Negative for dysuria.   Skin: Negative.    Neurological: Negative.    Psychiatric/Behavioral: Negative.     All other systems reviewed and are negative.      Past Medical History:   Diagnosis Date    A-fib     Abnormal ECG     Anemia     Anxiety     Asthma     Cancer     Ovarian    Depression     Diabetes mellitus     DVT (deep venous thrombosis)     Factor 5 Leiden mutation, heterozygous     Fibroid     GERD (gastroesophageal reflux disease)     Gout     H/O abdominal abscess     History of sepsis     History of transfusion     Hyperlipidemia     Hypothyroid     Kidney stone     Migraines     Neuropathy     Ovarian cancer 2021    Ovarian cyst     PE (pulmonary embolism)     Polycystic ovary syndrome     Preeclampsia     Rh incompatibility     Stroke     TIA (transient ischemic attack)     Urinary tract infection     Varicella        Allergies   Allergen Reactions    Toradol [Ketorolac Tromethamine] Anaphylaxis and Hives    Haldol [Haloperidol] Hives and Mental Status Change    Tramadol Hives and Swelling    Amoxicillin Hives and Rash    Penicillins Hives and Rash       Past Surgical History:   Procedure Laterality Date    ABDOMINAL SURGERY      CARDIAC CATHETERIZATION       SECTION      CHOLECYSTECTOMY      COLONOSCOPY       ENDOSCOPY      EXTRACORPOREAL SHOCK WAVE LITHOTRIPSY (ESWL) Left 10/22/2021    Procedure: EXTRACORPOREAL SHOCKWAVE LITHOTRIPSY;  Surgeon: Milan Motley MD;  Location: [facility];  Service: Urology;  Laterality: Left;    LITHOTRIPSY      RIGHT OOPHORECTOMY      URETEROSCOPY LASER LITHOTRIPSY WITH STENT INSERTION Left 10/01/2021    Procedure: URETEROSCOPY WITH STENT PLACEMENT;  Surgeon: Milan Motley MD;  Location: [facility];  Service: Urology;  Laterality: Left;    URETEROSCOPY LASER LITHOTRIPSY WITH STENT INSERTION Left 4/27/2022    Procedure: URETEROSCOPY STENT REMOVAL;  Surgeon: Milan Motley MD;  Location: [facility];  Service: Urology;  Laterality: Left;    URETEROSCOPY LASER LITHOTRIPSY WITH STENT INSERTION Left 6/29/2022    Procedure: CYSTOSCOPY RETROGRADE LEFT WITH STENT PLACEMENT;  Surgeon: Milan Motley MD;  Location: [facility];  Service: Urology;  Laterality: Left;       Family History   Problem Relation Age of Onset    Diabetes Father     Hypertension Father     Heart attack Father     Hypertension Mother     No Known Problems Paternal Grandmother     No Known Problems Paternal Grandfather     No Known Problems Maternal Grandmother     No Known Problems Maternal Grandfather     No Known Problems Sister     No Known Problems Brother     No Known Problems Daughter     No Known Problems Son     No Known Problems Cousin     Rheum arthritis Neg Hx     Osteoarthritis Neg Hx     Asthma Neg Hx     Heart failure Neg Hx     Hyperlipidemia Neg Hx     Migraines Neg Hx     Rashes / Skin problems Neg Hx     Seizures Neg Hx     Stroke Neg Hx     Thyroid disease Neg Hx        Social History     Socioeconomic History    Marital status:    Tobacco Use    Smoking status: Never     Passive exposure: Never    Smokeless tobacco: Never   Vaping Use    Vaping Use: Never used   Substance and Sexual Activity    Alcohol use: No    Drug use: Never     Comment: Pt has track marks on right  "arm. Pt states \"It's from my INR being drawn.\"     Sexual activity: Not Currently     Partners: Male     Birth control/protection: None           Objective   Physical Exam  Vitals and nursing note reviewed.   Constitutional:       General: She is not in acute distress.     Appearance: She is well-developed. She is not diaphoretic.   HENT:      Head: Normocephalic and atraumatic.      Right Ear: External ear normal.      Left Ear: External ear normal.      Nose: Nose normal.   Eyes:      Conjunctiva/sclera: Conjunctivae normal.      Pupils: Pupils are equal, round, and reactive to light.   Neck:      Vascular: No JVD.      Trachea: No tracheal deviation.   Cardiovascular:      Rate and Rhythm: Normal rate.      Heart sounds: No murmur heard.  Pulmonary:      Effort: Pulmonary effort is normal. No respiratory distress.      Breath sounds: No wheezing.   Abdominal:      General: Bowel sounds are normal.      Palpations: Abdomen is soft.      Tenderness: There is no abdominal tenderness. There is right CVA tenderness.   Musculoskeletal:         General: No deformity. Normal range of motion.      Cervical back: Normal range of motion and neck supple.   Skin:     General: Skin is warm and dry.      Coloration: Skin is not pale.      Findings: No erythema or rash.   Neurological:      Mental Status: She is alert and oriented to person, place, and time.      Cranial Nerves: No cranial nerve deficit.   Psychiatric:         Behavior: Behavior normal.         Thought Content: Thought content normal.         Procedures           ED Course  ED Course as of 01/31/24 0536   Tue Jan 02, 2024   0540 CT abdomen pelvis rad interpreted (faxed report): No hydronephrosis or kidney stones [RB]      ED Course User Index  [RB] Thierry Fisher II, PA                                             Medical Decision Making  38-year-old with right flank pain.    Problems Addressed:  Right flank pain: acute illness or injury     Details: No acute " findings noted on CT imaging.  Laboratory workup is benign.  Outpatient follow-up as needed.    Amount and/or Complexity of Data Reviewed  Labs: ordered.  Radiology: ordered. Decision-making details documented in ED Course.    Risk  Prescription drug management.        Final diagnoses:   Right flank pain       ED Disposition  ED Disposition       ED Disposition   Discharge    Condition   Stable    Comment   --               Eddie Latif, APRN  [address] [facility] [id number] [phone]    Schedule an appointment as soon as possible for a visit            Where to Get Your Medications        These medications were sent to [facility] - [place], [place] - [address] [place] [address] - [phone]  - [phone] [address] [address]      Phone: [phone]   tiZANidine 4 MG tablet          Medication List      No changes were made to your prescriptions during this visit.            Thierry Fisher II, PA  01/31/24 0536

## 2024-02-13 ENCOUNTER — APPOINTMENT (OUTPATIENT)
Dept: CT IMAGING | Facility: HOSPITAL | Age: 39
End: 2024-02-13
Payer: COMMERCIAL

## 2024-02-13 ENCOUNTER — APPOINTMENT (OUTPATIENT)
Dept: GENERAL RADIOLOGY | Facility: HOSPITAL | Age: 39
End: 2024-02-13
Payer: COMMERCIAL

## 2024-02-13 ENCOUNTER — HOSPITAL ENCOUNTER (EMERGENCY)
Facility: HOSPITAL | Age: 39
Discharge: HOME | End: 2024-02-13
Attending: STUDENT IN AN ORGANIZED HEALTH CARE EDUCATION/TRAINING PROGRAM | Admitting: STUDENT IN AN ORGANIZED HEALTH CARE EDUCATION/TRAINING PROGRAM
Payer: COMMERCIAL

## 2024-02-13 VITALS
OXYGEN SATURATION: 97 % | HEART RATE: 108 BPM | HEIGHT: 64 IN | BODY MASS INDEX: 50.02 KG/M2 | DIASTOLIC BLOOD PRESSURE: 75 MMHG | WEIGHT: 293 LBS | SYSTOLIC BLOOD PRESSURE: 123 MMHG | TEMPERATURE: 98.4 F | RESPIRATION RATE: 16 BRPM

## 2024-02-13 DIAGNOSIS — R07.9 CHEST PAIN, UNSPECIFIED TYPE: Primary | ICD-10-CM

## 2024-02-13 LAB
ALBUMIN SERPL-MCNC: 4.5 G/DL (ref 3.5–5.2)
ALBUMIN/GLOB SERPL: 1.3 G/DL
ALP SERPL-CCNC: 89 U/L (ref 39–117)
ALT SERPL W P-5'-P-CCNC: 18 U/L (ref 1–33)
ANION GAP SERPL CALCULATED.3IONS-SCNC: 14.3 MMOL/L (ref 5–15)
AST SERPL-CCNC: 26 U/L (ref 1–32)
BASOPHILS # BLD AUTO: 0.04 10*3/MM3 (ref 0–0.2)
BASOPHILS NFR BLD AUTO: 0.6 % (ref 0–1.5)
BILIRUB SERPL-MCNC: 0.4 MG/DL (ref 0–1.2)
BUN SERPL-MCNC: 18 MG/DL (ref 6–20)
BUN/CREAT SERPL: 26.5 (ref 7–25)
CALCIUM SPEC-SCNC: 10.2 MG/DL (ref 8.6–10.5)
CHLORIDE SERPL-SCNC: 99 MMOL/L (ref 98–107)
CO2 SERPL-SCNC: 22.7 MMOL/L (ref 22–29)
CREAT SERPL-MCNC: 0.68 MG/DL (ref 0.57–1)
DEPRECATED RDW RBC AUTO: 45.3 FL (ref 37–54)
EGFRCR SERPLBLD CKD-EPI 2021: 115.2 ML/MIN/1.73
EOSINOPHIL # BLD AUTO: 0.18 10*3/MM3 (ref 0–0.4)
EOSINOPHIL NFR BLD AUTO: 2.6 % (ref 0.3–6.2)
ERYTHROCYTE [DISTWIDTH] IN BLOOD BY AUTOMATED COUNT: 14.4 % (ref 12.3–15.4)
FLUAV RNA RESP QL NAA+PROBE: NOT DETECTED
FLUBV RNA RESP QL NAA+PROBE: NOT DETECTED
GEN 5 2HR TROPONIN T REFLEX: 18 NG/L
GLOBULIN UR ELPH-MCNC: 3.5 GM/DL
GLUCOSE SERPL-MCNC: 241 MG/DL (ref 65–99)
HCT VFR BLD AUTO: 38.4 % (ref 34–46.6)
HGB BLD-MCNC: 12.8 G/DL (ref 12–15.9)
HOLD SPECIMEN: NORMAL
HOLD SPECIMEN: NORMAL
IMM GRANULOCYTES # BLD AUTO: 0.03 10*3/MM3 (ref 0–0.05)
IMM GRANULOCYTES NFR BLD AUTO: 0.4 % (ref 0–0.5)
LYMPHOCYTES # BLD AUTO: 2.09 10*3/MM3 (ref 0.7–3.1)
LYMPHOCYTES NFR BLD AUTO: 30.6 % (ref 19.6–45.3)
MCH RBC QN AUTO: 28.9 PG (ref 26.6–33)
MCHC RBC AUTO-ENTMCNC: 33.3 G/DL (ref 31.5–35.7)
MCV RBC AUTO: 86.7 FL (ref 79–97)
MONOCYTES # BLD AUTO: 0.56 10*3/MM3 (ref 0.1–0.9)
MONOCYTES NFR BLD AUTO: 8.2 % (ref 5–12)
NEUTROPHILS NFR BLD AUTO: 3.93 10*3/MM3 (ref 1.7–7)
NEUTROPHILS NFR BLD AUTO: 57.6 % (ref 42.7–76)
NRBC BLD AUTO-RTO: 0 /100 WBC (ref 0–0.2)
PLATELET # BLD AUTO: 217 10*3/MM3 (ref 140–450)
PMV BLD AUTO: 9.2 FL (ref 6–12)
POTASSIUM SERPL-SCNC: 3.8 MMOL/L (ref 3.5–5.2)
PROT SERPL-MCNC: 8 G/DL (ref 6–8.5)
QT INTERVAL: 338 MS
QTC INTERVAL: 455 MS
RBC # BLD AUTO: 4.43 10*6/MM3 (ref 3.77–5.28)
SARS-COV-2 RNA RESP QL NAA+PROBE: NOT DETECTED
SODIUM SERPL-SCNC: 136 MMOL/L (ref 136–145)
TROPONIN T DELTA: -1 NG/L
TROPONIN T SERPL HS-MCNC: 19 NG/L
WBC NRBC COR # BLD AUTO: 6.83 10*3/MM3 (ref 3.4–10.8)
WHOLE BLOOD HOLD COAG: NORMAL
WHOLE BLOOD HOLD SPECIMEN: NORMAL

## 2024-02-13 PROCEDURE — 87636 SARSCOV2 & INF A&B AMP PRB: CPT | Performed by: NURSE PRACTITIONER

## 2024-02-13 PROCEDURE — 71275 CT ANGIOGRAPHY CHEST: CPT | Performed by: RADIOLOGY

## 2024-02-13 PROCEDURE — 80053 COMPREHEN METABOLIC PANEL: CPT | Performed by: STUDENT IN AN ORGANIZED HEALTH CARE EDUCATION/TRAINING PROGRAM

## 2024-02-13 PROCEDURE — 71275 CT ANGIOGRAPHY CHEST: CPT

## 2024-02-13 PROCEDURE — 25010000002 ORPHENADRINE CITRATE PER 60 MG: Performed by: NURSE PRACTITIONER

## 2024-02-13 PROCEDURE — 93005 ELECTROCARDIOGRAM TRACING: CPT | Performed by: STUDENT IN AN ORGANIZED HEALTH CARE EDUCATION/TRAINING PROGRAM

## 2024-02-13 PROCEDURE — 99285 EMERGENCY DEPT VISIT HI MDM: CPT

## 2024-02-13 PROCEDURE — 84484 ASSAY OF TROPONIN QUANT: CPT | Performed by: STUDENT IN AN ORGANIZED HEALTH CARE EDUCATION/TRAINING PROGRAM

## 2024-02-13 PROCEDURE — 71046 X-RAY EXAM CHEST 2 VIEWS: CPT

## 2024-02-13 PROCEDURE — 96374 THER/PROPH/DIAG INJ IV PUSH: CPT

## 2024-02-13 PROCEDURE — 36415 COLL VENOUS BLD VENIPUNCTURE: CPT

## 2024-02-13 PROCEDURE — 25510000001 IOPAMIDOL PER 1 ML: Performed by: STUDENT IN AN ORGANIZED HEALTH CARE EDUCATION/TRAINING PROGRAM

## 2024-02-13 PROCEDURE — 71046 X-RAY EXAM CHEST 2 VIEWS: CPT | Performed by: RADIOLOGY

## 2024-02-13 PROCEDURE — 85025 COMPLETE CBC W/AUTO DIFF WBC: CPT | Performed by: STUDENT IN AN ORGANIZED HEALTH CARE EDUCATION/TRAINING PROGRAM

## 2024-02-13 RX ORDER — CLONIDINE HYDROCHLORIDE 0.1 MG/1
0.1 TABLET ORAL ONCE
Status: COMPLETED | OUTPATIENT
Start: 2024-02-13 | End: 2024-02-13

## 2024-02-13 RX ORDER — NITROGLYCERIN 0.4 MG/1
0.4 TABLET SUBLINGUAL
Status: DISCONTINUED | OUTPATIENT
Start: 2024-02-13 | End: 2024-02-13 | Stop reason: HOSPADM

## 2024-02-13 RX ORDER — SODIUM CHLORIDE 0.9 % (FLUSH) 0.9 %
10 SYRINGE (ML) INJECTION AS NEEDED
Status: DISCONTINUED | OUTPATIENT
Start: 2024-02-13 | End: 2024-02-13 | Stop reason: HOSPADM

## 2024-02-13 RX ORDER — ORPHENADRINE CITRATE 30 MG/ML
60 INJECTION INTRAMUSCULAR; INTRAVENOUS ONCE
Status: COMPLETED | OUTPATIENT
Start: 2024-02-13 | End: 2024-02-13

## 2024-02-13 RX ADMIN — IOPAMIDOL 100 ML: 755 INJECTION, SOLUTION INTRAVENOUS at 13:14

## 2024-02-13 RX ADMIN — CLONIDINE HYDROCHLORIDE 0.1 MG: 0.1 TABLET ORAL at 12:01

## 2024-02-13 RX ADMIN — NITROGLYCERIN 1 INCH: 20 OINTMENT TOPICAL at 12:02

## 2024-02-13 RX ADMIN — ORPHENADRINE CITRATE 60 MG: 60 INJECTION INTRAMUSCULAR; INTRAVENOUS at 15:02

## 2024-02-14 ENCOUNTER — PATIENT OUTREACH (OUTPATIENT)
Dept: CASE MANAGEMENT | Facility: OTHER | Age: 39
End: 2024-02-14
Payer: COMMERCIAL

## 2024-02-16 ENCOUNTER — TRANSCRIBE ORDERS (OUTPATIENT)
Dept: ADMINISTRATIVE | Facility: HOSPITAL | Age: 39
End: 2024-02-16
Payer: COMMERCIAL

## 2024-02-16 DIAGNOSIS — I25.119 CHEST PAIN DUE TO CAD: Primary | ICD-10-CM

## 2024-03-07 ENCOUNTER — HOSPITAL ENCOUNTER (OUTPATIENT)
Dept: NUCLEAR MEDICINE | Facility: HOSPITAL | Age: 39
Discharge: HOME | End: 2024-03-07
Payer: COMMERCIAL

## 2024-03-07 ENCOUNTER — HOSPITAL ENCOUNTER (OUTPATIENT)
Dept: CARDIOLOGY | Facility: HOSPITAL | Age: 39
Discharge: HOME | End: 2024-03-07
Payer: COMMERCIAL

## 2024-03-07 DIAGNOSIS — I25.119 CHEST PAIN DUE TO CAD: ICD-10-CM

## 2024-03-07 PROCEDURE — 78452 HT MUSCLE IMAGE SPECT MULT: CPT

## 2024-03-07 PROCEDURE — A9500 TC99M SESTAMIBI: HCPCS | Performed by: PHYSICIAN ASSISTANT

## 2024-03-07 PROCEDURE — 0 TECHNETIUM SESTAMIBI: Performed by: PHYSICIAN ASSISTANT

## 2024-03-07 PROCEDURE — 25010000002 REGADENOSON 0.4 MG/5ML SOLUTION: Performed by: PHYSICIAN ASSISTANT

## 2024-03-07 PROCEDURE — 93017 CV STRESS TEST TRACING ONLY: CPT

## 2024-03-07 PROCEDURE — 93306 TTE W/DOPPLER COMPLETE: CPT

## 2024-03-07 RX ORDER — REGADENOSON 0.08 MG/ML
0.4 INJECTION, SOLUTION INTRAVENOUS
Status: COMPLETED | OUTPATIENT
Start: 2024-03-07 | End: 2024-03-07

## 2024-03-07 RX ADMIN — TECHNETIUM TC 99M SESTAMIBI 1 DOSE: 1 INJECTION INTRAVENOUS at 12:27

## 2024-03-07 RX ADMIN — TECHNETIUM TC 99M SESTAMIBI 1 DOSE: 1 INJECTION INTRAVENOUS at 10:50

## 2024-03-07 RX ADMIN — REGADENOSON 0.4 MG: 0.08 INJECTION, SOLUTION INTRAVENOUS at 12:27

## 2024-03-08 LAB
BH CV ECHO MEAS - AO MAX PG: 6.7 MMHG
BH CV ECHO MEAS - AO MEAN PG: 4 MMHG
BH CV ECHO MEAS - AO ROOT DIAM: 3.4 CM
BH CV ECHO MEAS - AO V2 MAX: 129 CM/SEC
BH CV ECHO MEAS - AO V2 VTI: 19.8 CM
BH CV ECHO MEAS - EDV(CUBED): 79.5 ML
BH CV ECHO MEAS - EDV(MOD-SP4): 95.2 ML
BH CV ECHO MEAS - EF(MOD-SP4): 51.4 %
BH CV ECHO MEAS - ESV(CUBED): 32.8 ML
BH CV ECHO MEAS - ESV(MOD-SP4): 46.3 ML
BH CV ECHO MEAS - FS: 25.6 %
BH CV ECHO MEAS - IVS/LVPW: 0.82 CM
BH CV ECHO MEAS - IVSD: 0.9 CM
BH CV ECHO MEAS - LA DIMENSION: 4.1 CM
BH CV ECHO MEAS - LAT PEAK E' VEL: 11.2 CM/SEC
BH CV ECHO MEAS - LV DIASTOLIC VOL/BSA (35-75): 39.8 CM2
BH CV ECHO MEAS - LV MASS(C)D: 142.5 GRAMS
BH CV ECHO MEAS - LV SYSTOLIC VOL/BSA (12-30): 19.4 CM2
BH CV ECHO MEAS - LVIDD: 4.3 CM
BH CV ECHO MEAS - LVIDS: 3.2 CM
BH CV ECHO MEAS - LVOT AREA: 3.8 CM2
BH CV ECHO MEAS - LVOT DIAM: 2.2 CM
BH CV ECHO MEAS - LVPWD: 1.1 CM
BH CV ECHO MEAS - MED PEAK E' VEL: 6.7 CM/SEC
BH CV ECHO MEAS - MV A MAX VEL: 69.2 CM/SEC
BH CV ECHO MEAS - MV E MAX VEL: 72.2 CM/SEC
BH CV ECHO MEAS - MV E/A: 1.04
BH CV ECHO MEAS - SI(MOD-SP4): 20.4 ML/M2
BH CV ECHO MEAS - SV(MOD-SP4): 48.9 ML
BH CV ECHO MEAS - TAPSE (>1.6): 2.33 CM
BH CV ECHO MEASUREMENTS AVERAGE E/E' RATIO: 8.07
BH CV NUCLEAR PRIOR STUDY: 3
BH CV REST NUCLEAR ISOTOPE DOSE: 10.8 MCI
BH CV STRESS BP STAGE 1: NORMAL
BH CV STRESS COMMENTS STAGE 1: NORMAL
BH CV STRESS DOSE REGADENOSON STAGE 1: 0.4
BH CV STRESS DURATION MIN STAGE 1: 0
BH CV STRESS DURATION SEC STAGE 1: 10
BH CV STRESS HR STAGE 1: 106
BH CV STRESS NUCLEAR ISOTOPE DOSE: 30.1 MCI
BH CV STRESS PROTOCOL 1: NORMAL
BH CV STRESS RECOVERY BP: NORMAL MMHG
BH CV STRESS RECOVERY HR: 99 BPM
BH CV STRESS STAGE 1: 1
LV EF NUC BP: 55 %
MAXIMAL PREDICTED HEART RATE: 183 BPM
PERCENT MAX PREDICTED HR: 57.92 %
STRESS BASELINE BP: NORMAL MMHG
STRESS BASELINE HR: 96 BPM
STRESS PERCENT HR: 68 %
STRESS POST PEAK BP: NORMAL MMHG
STRESS POST PEAK HR: 106 BPM
STRESS TARGET HR: 156 BPM

## 2024-03-12 ENCOUNTER — OFFICE VISIT (OUTPATIENT)
Dept: CARDIOLOGY | Facility: CLINIC | Age: 39
End: 2024-03-12
Payer: COMMERCIAL

## 2024-03-12 VITALS
HEIGHT: 64 IN | SYSTOLIC BLOOD PRESSURE: 117 MMHG | BODY MASS INDEX: 55.1 KG/M2 | HEART RATE: 84 BPM | DIASTOLIC BLOOD PRESSURE: 75 MMHG | OXYGEN SATURATION: 96 %

## 2024-03-12 DIAGNOSIS — Z82.49 FAMILY HISTORY OF PREMATURE CORONARY ARTERY DISEASE: ICD-10-CM

## 2024-03-12 DIAGNOSIS — I10 ESSENTIAL HYPERTENSION: ICD-10-CM

## 2024-03-12 DIAGNOSIS — Z79.4 TYPE 2 DIABETES MELLITUS WITHOUT COMPLICATION, WITH LONG-TERM CURRENT USE OF INSULIN: ICD-10-CM

## 2024-03-12 DIAGNOSIS — E11.9 TYPE 2 DIABETES MELLITUS WITHOUT COMPLICATION, WITH LONG-TERM CURRENT USE OF INSULIN: ICD-10-CM

## 2024-03-12 DIAGNOSIS — R07.2 PRECORDIAL PAIN: Primary | ICD-10-CM

## 2024-03-12 RX ORDER — CLOPIDOGREL BISULFATE 75 MG/1
75 TABLET ORAL DAILY
Qty: 30 TABLET | Refills: 3 | Status: SHIPPED | OUTPATIENT
Start: 2024-03-12

## 2024-03-12 RX ORDER — ISOSORBIDE MONONITRATE 60 MG/1
60 TABLET, EXTENDED RELEASE ORAL EVERY MORNING
Qty: 30 TABLET | Refills: 3 | Status: SHIPPED | OUTPATIENT
Start: 2024-03-12

## 2024-03-12 NOTE — LETTER
March 12, 2024     Bladimir Calle PA-C  [address] [facility] [address]    Patient: Natalia Arzate   YOB: 1985   Date of Visit: 3/12/2024     Dear Bladimir Calle PA-C:       Thank you for referring Natalia Arzate to me for evaluation. Below are the relevant portions of my assessment and plan of care.    If you have questions, please do not hesitate to call me. I look forward to following Natalia along with you.         Sincerely,        Jon Pereira MD        CC: No Recipients    Jon Pereira MD  03/12/24 [id number]  Sign when Signing Visit  Bladimir Calle PA-C  Natalia Arzate  1985 03/12/2024    Patient Active Problem List   Diagnosis   • Nephrolithiasis   • Ovarian teratoma, right   • Other chronic pain   • Pelvic pain   • History of DVT in adulthood   • History of pulmonary embolism   • Ureteral calculus   • Ischemic cerebrovascular accident (CVA)   • Primary hypertension   • Left lumbar radiculopathy   • PTSD (post-traumatic stress disorder)   • Acute kidney failure, unspecified   • Anemia   • Dyslipidemia   • Hypothyroid   • Other secondary gout, multiple sites   • Factor 5 Leiden mutation, heterozygous   • Vitamin D deficiency   • Vitamin B 12 deficiency   • Type 2 diabetes mellitus with hyperglycemia, with long-term current use of insulin   • Hoarseness, persistent   • Ureterolithiasis   • Acute cystitis without hematuria   • Hepatic steatosis       Dear Bladimir Calle PA-C:    Subjective     Natalia Arzate is a 38 y.o. female with the problems as listed above, presents    Chief complaint: Recurrent chest pains.    History of Present Illness: Ms. Natalia Arzate is a pleasant 38-year-old  female with history of hypertension and type 2 diabetes mellitus, non-smoker, history of factor V deficiency, and a positive family history of premature coronary artery disease with one of her aunts having had heart attacks reportedly in her 30s, presents with  complaints of having recurrent chest pains for the last couple of weeks.  She describes the chest pains as being felt as sharp or dull pains on the left side of her chest that seem to occur at random and resolve spontaneously.  There is some associated shortness of breath but no sweating.  She had a recent cardiac evaluation with a Lexiscan sestamibi study on 3/7/2024 that revealed apparently a anterior and anterolateral perfusion defect that was worse in the rest images as compared to the stress images and was considered to be possibly a breast attenuation artifact.  Her echo Doppler study revealed normal LV wall motion and systolic function with an estimated LV ejection fraction of 55 to 60% with normal valvular echoes.        He reportedly had a stroke about a year and a half ago involving her left upper and lower extremities.    Regadenoson Stress Test with Myocardial Perfusion SPECT (Multi Study)    Accession Number: [id number]   Date of Study: 3/7/24   Ordering Provider: Bladimir Calle PA-C   Clinical Indications: Chest Pain        Reading Physicians  Performing Staff   ECG [place], SPECT [place]: Parrish Valencia MD    Tech: Satya Valle RN   Support Staff: Armando Lee Rodney D        Interpretation Summary   •  Patient has dense breast attenuation artifact defect in the anterior and the anterolateral myocardium, the defect appears more worse in the rest compared to the stress perfusion images, if there is clinical concern for evaluation of CAD consider coronary CT angiogram.  •  Left ventricular ejection fraction is normal (Calculated EF = 55%).  •  Breast attenuation artifact is present.  •  Findings consistent with a normal ECG stress test.    Complete Transthoracic Echocardiogram with Complete Doppler and Color Flow    Accession Number: [id number]   Date of Study: 3/7/24   Ordering Provider: Bladimir Calle PA-C   Clinical Indications: Chest Pain        Reading  Physicians  Performing Staff   Cardiology: Jon Pereira MD    Tech: Luisa Thakur              Show images for Adult Transthoracic Echo Complete W/ Cont if Necessary Per Protocol   Clinical Indication    Chest Pain   Dx: Chest pain due to CAD [I25.119 (ICD-10-CM)]     Interpretation Summary   •  Normal left ventricular cavity size and wall thickness noted. All left ventricular wall segments contract normally.  •  Left ventricular ejection fraction appears to be 56 - 60%.  •  The aortic valve is structurally normal with no regurgitation or stenosis present.  •  The mitral valve is structurally normal with no regurgitation or significant stenosis present.  •  There is no evidence of pericardial effusion. .    Cardiac risk factors:diabetes mellitus, hypercholesterolemia, hypertension, Positive family Hx. of premature athersclerotivc disease., Sedentary life style, and Obesity    Allergies   Allergen Reactions   • Toradol [Ketorolac Tromethamine] Anaphylaxis and Hives   • Haldol [Haloperidol] Hives and Mental Status Change   • Tramadol Hives and Swelling   • Amoxicillin Hives and Rash   • Penicillins Hives and Rash   :    Current Outpatient Medications:   •  albuterol (PROVENTIL) (2.5 MG/3ML) 0.083% nebulizer solution, Take 2.5 mg by nebulization Every 6 (Six) Hours As Needed for Wheezing or Shortness of Air., Disp: 150 mL, Rfl: 3  •  allopurinol (ZYLOPRIM) 100 MG tablet, Take 1 tablet by mouth Daily., Disp: 30 tablet, Rfl: 3  •  amLODIPine (NORVASC) 10 MG tablet, Take 1 tablet by mouth Daily., Disp: 30 tablet, Rfl: 3  •  apixaban (ELIQUIS) 5 MG tablet tablet, Take 1 tablet by mouth 2 (Two) Times a Day., Disp: 60 tablet, Rfl: 3  •  azelastine (ASTELIN) 0.1 % nasal spray, 2 sprays both nostrils twice daily as needed, Disp: 1 each, Rfl: 3  •  Continuous Blood Gluc Sensor (Dexcom G6 Sensor), Every 10 (Ten) Days., Disp: 9 each, Rfl: 3  •  cyanocobalamin ([employer] Vitamin B-12) 2000 MCG tablet, Take 1 tablet by mouth  "Daily., Disp: 30 tablet, Rfl: 2  •  DULoxetine (CYMBALTA) 30 MG capsule, Take 1 capsule by mouth 2 (Two) Times a Day., Disp: 60 capsule, Rfl: 2  •  gabapentin (NEURONTIN) 300 MG capsule, TAKE ONE CAPSULE BY MOUTH ONCE NIGHTLY AS NEEDED FOR NEUROPATHY, Disp: 30 capsule, Rfl: 0  •  glucose blood test strip, 1 each by Other route 3 (Three) Times a Day., Disp: 100 each, Rfl: 5  •  Insulin Glargine (Lantus SoloStar) 100 UNIT/ML injection pen, Maximum daily dose of 75 units., Disp: 24 mL, Rfl: 3  •  Insulin Lispro, 1 Unit Dial, (HUMALOG) 100 UNIT/ML solution pen-injector, Per sliding scale, Disp: 15 mL, Rfl: 0  •  Insulin Pen Needle 30G X 8 MM misc, 1 Device 4 (Four) Times a Day., Disp: 200 each, Rfl: 0  •  Insulin Syringe-Needle U-100 30G X 5/16\" 0.5 ML misc, Per sliding scale, Disp: 50 each, Rfl: 0  •  ipratropium-albuterol (DUO-NEB) 0.5-2.5 mg/3 ml nebulizer, Take 3 mL by nebulization Every 4 (Four) Hours As Needed for Shortness of Air., Disp: 150 mL, Rfl: 2  •  Lancets Micro Thin 33G misc, 1 Device 3 (Three) Times a Day., Disp: 100 each, Rfl: 3  •  metoprolol succinate XL (TOPROL-XL) 50 MG 24 hr tablet, Take 1 tablet by mouth Daily. (Patient taking differently: Take 1 tablet by mouth 2 (Two) Times a Day.), Disp: 30 tablet, Rfl: 0  •  mometasone-formoterol (DULERA 200) 200-5 MCG/ACT inhaler, 2 puffs twice daily, Disp: 1 g, Rfl: 2  •  montelukast (SINGULAIR) 10 MG tablet, Take 1 tablet by mouth Every Night., Disp: 30 tablet, Rfl: 3  •  Respiratory Therapy Supplies (Nebulizer/Tubing/Mouthpiece) kit, 1 each Take As Directed., Disp: 1 each, Rfl: 1  •  rosuvastatin (CRESTOR) 20 MG tablet, Take 1 tablet by mouth Every Night. (Patient taking differently: Take 2 tablets by mouth Every Night.), Disp: 30 tablet, Rfl: 5  •  Tirzepatide (Mounjaro) 7.5 MG/0.5ML solution pen-injector pen, Inject 0.5 mL under the skin into the appropriate area as directed 1 (One) Time Per Week., Disp: , Rfl:   •  topiramate (TOPAMAX) 25 MG tablet, " Take 1 tablet by mouth Daily., Disp: 30 tablet, Rfl: 0  •  Ventolin  (90 Base) MCG/ACT inhaler, INHALE TWO PUFFS BY MOUTH EVERY 4 HOURS AS NEEDED FOR BREATHING, Disp: 18 g, Rfl: 3  •  benzonatate (TESSALON) 200 MG capsule, Take 1 capsule by mouth 3 (Three) Times a Day As Needed for Cough. (Patient not taking: Reported on 3/12/2024), Disp: 20 capsule, Rfl: 1  •  clopidogrel (PLAVIX) 75 MG tablet, Take 1 tablet by mouth Daily., Disp: 30 tablet, Rfl: 3  •  isosorbide mononitrate (IMDUR) 60 MG 24 hr tablet, Take 1 tablet by mouth Every Morning., Disp: 30 tablet, Rfl: 3    Past Medical History:   Diagnosis Date   • A-fib    • Abnormal ECG    • Anemia    • Anxiety    • Asthma    • Cancer     Ovarian   • Depression    • Diabetes mellitus    • DVT (deep venous thrombosis)    • Factor 5 Leiden mutation, heterozygous    • Fibroid    • GERD (gastroesophageal reflux disease)    • Gout    • H/O abdominal abscess    • History of sepsis    • History of transfusion    • Hyperlipidemia    • Hypothyroid    • Kidney stone    • Migraines    • Neuropathy    • Ovarian cancer 2021   • Ovarian cyst    • PE (pulmonary embolism)    • Polycystic ovary syndrome    • Preeclampsia    • Rh incompatibility    • Stroke    • TIA (transient ischemic attack)    • Urinary tract infection    • Varicella      Past Surgical History:   Procedure Laterality Date   • ABDOMINAL SURGERY     • CARDIAC CATHETERIZATION     •  SECTION     • CHOLECYSTECTOMY     • COLONOSCOPY     • ENDOSCOPY     • EXTRACORPOREAL SHOCK WAVE LITHOTRIPSY (ESWL) Left 10/22/2021    Procedure: EXTRACORPOREAL SHOCKWAVE LITHOTRIPSY;  Surgeon: Milan Motley MD;  Location: [facility] OR;  Service: Urology;  Laterality: Left;   • LITHOTRIPSY     • NEPHRECTOMY Left 10/2022   • RIGHT OOPHORECTOMY     • URETEROSCOPY LASER LITHOTRIPSY WITH STENT INSERTION Left 10/01/2021    Procedure: URETEROSCOPY WITH STENT PLACEMENT;  Surgeon: Milan Motley MD;  Location: [facility]  "OR;  Service: Urology;  Laterality: Left;   • URETEROSCOPY LASER LITHOTRIPSY WITH STENT INSERTION Left 04/27/2022    Procedure: URETEROSCOPY STENT REMOVAL;  Surgeon: Milan Motley MD;  Location: [facility] OR;  Service: Urology;  Laterality: Left;   • URETEROSCOPY LASER LITHOTRIPSY WITH STENT INSERTION Left 06/29/2022    Procedure: CYSTOSCOPY RETROGRADE LEFT WITH STENT PLACEMENT;  Surgeon: Milan Motley MD;  Location: [facility] OR;  Service: Urology;  Laterality: Left;     Family History   Problem Relation Age of Onset   • Diabetes Father    • Hypertension Father    • Heart attack Father    • Hypertension Mother    • No Known Problems Paternal Grandmother    • No Known Problems Paternal Grandfather    • No Known Problems Maternal Grandmother    • No Known Problems Maternal Grandfather    • No Known Problems Sister    • No Known Problems Brother    • No Known Problems Daughter    • No Known Problems Son    • No Known Problems Cousin    • Rheum arthritis Neg Hx    • Osteoarthritis Neg Hx    • Asthma Neg Hx    • Heart failure Neg Hx    • Hyperlipidemia Neg Hx    • Migraines Neg Hx    • Rashes / Skin problems Neg Hx    • Seizures Neg Hx    • Stroke Neg Hx    • Thyroid disease Neg Hx      Social History     Tobacco Use   • Smoking status: Never     Passive exposure: Never   • Smokeless tobacco: Never   Vaping Use   • Vaping status: Never Used   Substance Use Topics   • Alcohol use: No   • Drug use: Never     Comment: Pt has track marks on right arm. Pt states \"It's from my INR being drawn.\"      Review of Systems   Constitutional: Positive for malaise/fatigue.   HENT:  Positive for congestion.    Eyes: Negative.    Cardiovascular:  Positive for chest pain, leg swelling and palpitations.   Respiratory:  Positive for cough, shortness of breath and wheezing.    Hematologic/Lymphatic: Bruises/bleeds easily.   Musculoskeletal:  Positive for back pain.   Gastrointestinal:  Positive for nausea and vomiting. " "  Genitourinary:  Positive for dysuria and hematuria.   Neurological:  Positive for headaches, light-headedness and numbness.   Psychiatric/Behavioral:  Positive for depression. The patient has insomnia.      Objective   Blood pressure 117/75, pulse 84, height 162.6 cm (64\"), SpO2 96%, not currently breastfeeding.  Body mass index is 55.1 kg/m².    Vitals reviewed.   Constitutional:       Appearance: Healthy appearance. Well-developed and not in distress.   Eyes:      Conjunctiva/sclera: Conjunctivae normal.   HENT:      Head: Normocephalic.   Neck:      Thyroid: No thyromegaly.      Vascular: No JVD or JVR. JVD normal.      Trachea: No tracheal deviation.   Pulmonary:      Effort: Pulmonary effort is normal. No respiratory distress.      Breath sounds: Normal breath sounds. No wheezing. No rhonchi. No rales.   Chest:      Chest wall: Not tender to palpatation.   Cardiovascular:      PMI at left midclavicular line. Normal rate. Regular rhythm. Normal S1. Normal S2.       Murmurs: There is no murmur.      No gallop.  No click. No rub.   Pulses:     Intact distal pulses.   Edema:     Peripheral edema absent.   Abdominal:      General: Bowel sounds are normal.      Palpations: Abdomen is soft. There is no abdominal mass.      Tenderness: There is no abdominal tenderness.   Musculoskeletal: Normal range of motion.         General: No tenderness.      Cervical back: Normal range of motion and neck supple. Skin:     General: Skin is warm and dry.   Neurological:      General: No focal deficit present.      Mental Status: Alert, oriented to person, place, and time and oriented to person, place and time.      Cranial Nerves: No cranial nerve deficit.      Comments: Has grade 4/5 power in the left upper and lower extremities.     Lab Results   Component Value Date     02/13/2024    K 3.8 02/13/2024    CL 99 02/13/2024    CO2 22.7 02/13/2024    BUN 18 02/13/2024    CREATININE 0.68 02/13/2024    GLUCOSE 241 (H) " 02/13/2024    CALCIUM 10.2 02/13/2024    AST 26 02/13/2024    ALT 18 02/13/2024    ALKPHOS 89 02/13/2024    LABIL2 0.9 05/05/2020     Lab Results   Component Value Date    CKTOTAL 85 09/02/2023     Lab Results   Component Value Date    WBC 6.83 02/13/2024    HGB 12.8 02/13/2024    HCT 38.4 02/13/2024     02/13/2024     Lab Results   Component Value Date    INR 0.96 02/18/2024    INR 1.1 12/09/2023    INR 0.95 11/16/2023     Lab Results   Component Value Date    MG 1.5 02/18/2024        Lab Results   Component Value Date    BNP <2 04/05/2016         Assessment & Plan    Diagnosis Plan   1. Precordial pain        2. Essential hypertension        3. Type 2 diabetes mellitus without complication, with long-term current use of insulin        4. Family history of premature coronary artery disease            Recommendations  No orders of the defined types were placed in this encounter.       I have discussed the results of the nuclear stress test and echo Doppler study with the patient  I will add clopidogrel (in view of multiple coronary risk factors) and isosorbide mononitrate 60 mg daily.  If she continues to have recurrent chest pains then will consider the option of further cardiac evaluation with cardiac catheterization as she would not be candidate for CT coronary angiogram given her BMI of 55.    Return in about 5 weeks (around 4/16/2024) for or sooner if needed.    As always, Dalice ,  I appreciate very much the opportunity to participate in the cardiovascular care of your patients. Please do not hesitate to call me with any questions with regards to Natalia Arzate's evaluation and management.     With Best Regards,        Jon Pereira MD, FACC    Please note that portions of this note were completed with a voice recognition program.

## 2024-03-12 NOTE — PROGRESS NOTES
Bladimir Calle, JUMA  Natalia Arzate  1985 03/12/2024    Patient Active Problem List   Diagnosis    Nephrolithiasis    Ovarian teratoma, right    Other chronic pain    Pelvic pain    History of DVT in adulthood    History of pulmonary embolism    Ureteral calculus    Ischemic cerebrovascular accident (CVA)    Primary hypertension    Left lumbar radiculopathy    PTSD (post-traumatic stress disorder)    Acute kidney failure, unspecified    Anemia    Dyslipidemia    Hypothyroid    Other secondary gout, multiple sites    Factor 5 Leiden mutation, heterozygous    Vitamin D deficiency    Vitamin B 12 deficiency    Type 2 diabetes mellitus with hyperglycemia, with long-term current use of insulin    Hoarseness, persistent    Ureterolithiasis    Acute cystitis without hematuria    Hepatic steatosis       Dear Bladimir Calle, JUMA:    Justin Arzate is a 38 y.o. female with the problems as listed above, presents    Chief complaint: Recurrent chest pains.    History of Present Illness: Ms. Natalia Arzate is a pleasant 38-year-old  female with history of hypertension and type 2 diabetes mellitus, non-smoker, history of factor V deficiency, and a positive family history of premature coronary artery disease with one of her aunts having had heart attacks reportedly in her 30s, presents with complaints of having recurrent chest pains for the last couple of weeks.  She describes the chest pains as being felt as sharp or dull pains on the left side of her chest that seem to occur at random and resolve spontaneously.  There is some associated shortness of breath but no sweating.  She had a recent cardiac evaluation with a Lexiscan sestamibi study on 3/7/2024 that revealed apparently a anterior and anterolateral perfusion defect that was worse in the rest images as compared to the stress images and was considered to be possibly a breast attenuation artifact.  Her echo Doppler study revealed normal  LV wall motion and systolic function with an estimated LV ejection fraction of 55 to 60% with normal valvular echoes.        He reportedly had a stroke about a year and a half ago involving her left upper and lower extremities.    Regadenoson Stress Test with Myocardial Perfusion SPECT (Multi Study)    Accession Number: [id number]   Date of Study: 3/7/24   Ordering Provider: Bladimir Calle PA-C   Clinical Indications: Chest Pain        [facility]  Performing Staff   ECG Smyer, SPECT Smyer: Parrish Valencia MD    Tech: Satya Valle RN   Support Staff: Armando Lee Rodney D        Interpretation Summary     Patient has dense breast attenuation artifact defect in the anterior and the anterolateral myocardium, the defect appears more worse in the rest compared to the stress perfusion images, if there is clinical concern for evaluation of CAD consider coronary CT angiogram.    Left ventricular ejection fraction is normal (Calculated EF = 55%).    Breast attenuation artifact is present.    Findings consistent with a normal ECG stress test.    Complete Transthoracic Echocardiogram with Complete Doppler and Color Flow    Accession Number: [id number]   Date of Study: 3/7/24   Ordering Provider: Bladimir Calle PA-C   Clinical Indications: Chest Pain        [facility]  Performing Staff   Cardiology: Jon Pereira MD    Tech: Luisa Thakur              Show images for Adult Transthoracic Echo Complete W/ Cont if Necessary Per Protocol   Clinical Indication    Chest Pain   Dx: Chest pain due to CAD [I25.119 (ICD-10-CM)]     Interpretation Summary     Normal left ventricular cavity size and wall thickness noted. All left ventricular wall segments contract normally.    Left ventricular ejection fraction appears to be 56 - 60%.    The aortic valve is structurally normal with no regurgitation or stenosis present.    The mitral valve is structurally normal with no  regurgitation or significant stenosis present.    There is no evidence of pericardial effusion. .    Cardiac risk factors:diabetes mellitus, hypercholesterolemia, hypertension, Positive family Hx. of premature athersclerotivc disease., Sedentary life style, and Obesity    Allergies   Allergen Reactions    Toradol [Ketorolac Tromethamine] Anaphylaxis and Hives    Haldol [Haloperidol] Hives and Mental Status Change    Tramadol Hives and Swelling    Amoxicillin Hives and Rash    Penicillins Hives and Rash   :    Current Outpatient Medications:     albuterol (PROVENTIL) (2.5 MG/3ML) 0.083% nebulizer solution, Take 2.5 mg by nebulization Every 6 (Six) Hours As Needed for Wheezing or Shortness of Air., Disp: 150 mL, Rfl: 3    allopurinol (ZYLOPRIM) 100 MG tablet, Take 1 tablet by mouth Daily., Disp: 30 tablet, Rfl: 3    amLODIPine (NORVASC) 10 MG tablet, Take 1 tablet by mouth Daily., Disp: 30 tablet, Rfl: 3    apixaban (ELIQUIS) 5 MG tablet tablet, Take 1 tablet by mouth 2 (Two) Times a Day., Disp: 60 tablet, Rfl: 3    azelastine (ASTELIN) 0.1 % nasal spray, 2 sprays both nostrils twice daily as needed, Disp: 1 each, Rfl: 3    Continuous Blood Gluc Sensor (Dexcom G6 Sensor), Every 10 (Ten) Days., Disp: 9 each, Rfl: 3    cyanocobalamin ([employer] Vitamin B-12) 2000 MCG tablet, Take 1 tablet by mouth Daily., Disp: 30 tablet, Rfl: 2    DULoxetine (CYMBALTA) 30 MG capsule, Take 1 capsule by mouth 2 (Two) Times a Day., Disp: 60 capsule, Rfl: 2    gabapentin (NEURONTIN) 300 MG capsule, TAKE ONE CAPSULE BY MOUTH ONCE NIGHTLY AS NEEDED FOR NEUROPATHY, Disp: 30 capsule, Rfl: 0    glucose blood test strip, 1 each by Other route 3 (Three) Times a Day., Disp: 100 each, Rfl: 5    Insulin Glargine (Lantus SoloStar) 100 UNIT/ML injection pen, Maximum daily dose of 75 units., Disp: 24 mL, Rfl: 3    Insulin Lispro, 1 Unit Dial, (HUMALOG) 100 UNIT/ML solution pen-injector, Per sliding scale, Disp: 15 mL, Rfl: 0    Insulin Pen Needle 30G X 8 MM  "misc, 1 Device 4 (Four) Times a Day., Disp: 200 each, Rfl: 0    Insulin Syringe-Needle U-100 30G X 5/16\" 0.5 ML misc, Per sliding scale, Disp: 50 each, Rfl: 0    ipratropium-albuterol (DUO-NEB) 0.5-2.5 mg/3 ml nebulizer, Take 3 mL by nebulization Every 4 (Four) Hours As Needed for Shortness of Air., Disp: 150 mL, Rfl: 2    Lancets Micro Thin 33G misc, 1 Device 3 (Three) Times a Day., Disp: 100 each, Rfl: 3    metoprolol succinate XL (TOPROL-XL) 50 MG 24 hr tablet, Take 1 tablet by mouth Daily. (Patient taking differently: Take 1 tablet by mouth 2 (Two) Times a Day.), Disp: 30 tablet, Rfl: 0    mometasone-formoterol (DULERA 200) 200-5 MCG/ACT inhaler, 2 puffs twice daily, Disp: 1 g, Rfl: 2    montelukast (SINGULAIR) 10 MG tablet, Take 1 tablet by mouth Every Night., Disp: 30 tablet, Rfl: 3    Respiratory Therapy Supplies (Nebulizer/Tubing/Mouthpiece) kit, 1 each Take As Directed., Disp: 1 each, Rfl: 1    rosuvastatin (CRESTOR) 20 MG tablet, Take 1 tablet by mouth Every Night. (Patient taking differently: Take 2 tablets by mouth Every Night.), Disp: 30 tablet, Rfl: 5    Tirzepatide (Mounjaro) 7.5 MG/0.5ML solution pen-injector pen, Inject 0.5 mL under the skin into the appropriate area as directed 1 (One) Time Per Week., Disp: , Rfl:     topiramate (TOPAMAX) 25 MG tablet, Take 1 tablet by mouth Daily., Disp: 30 tablet, Rfl: 0    Ventolin  (90 Base) MCG/ACT inhaler, INHALE TWO PUFFS BY MOUTH EVERY 4 HOURS AS NEEDED FOR BREATHING, Disp: 18 g, Rfl: 3    benzonatate (TESSALON) 200 MG capsule, Take 1 capsule by mouth 3 (Three) Times a Day As Needed for Cough. (Patient not taking: Reported on 3/12/2024), Disp: 20 capsule, Rfl: 1    clopidogrel (PLAVIX) 75 MG tablet, Take 1 tablet by mouth Daily., Disp: 30 tablet, Rfl: 3    isosorbide mononitrate (IMDUR) 60 MG 24 hr tablet, Take 1 tablet by mouth Every Morning., Disp: 30 tablet, Rfl: 3    Past Medical History:   Diagnosis Date    A-fib     Abnormal ECG     Anemia     " Anxiety     Asthma     Cancer     Ovarian    Depression     Diabetes mellitus     DVT (deep venous thrombosis)     Factor 5 Leiden mutation, heterozygous     Fibroid     GERD (gastroesophageal reflux disease)     Gout     H/O abdominal abscess     History of sepsis     History of transfusion     Hyperlipidemia     Hypothyroid     Kidney stone     Migraines     Neuropathy     Ovarian cancer 2021    Ovarian cyst     PE (pulmonary embolism)     Polycystic ovary syndrome     Preeclampsia     Rh incompatibility     Stroke     TIA (transient ischemic attack)     Urinary tract infection     Varicella      Past Surgical History:   Procedure Laterality Date    ABDOMINAL SURGERY      CARDIAC CATHETERIZATION       SECTION      CHOLECYSTECTOMY      COLONOSCOPY      ENDOSCOPY      EXTRACORPOREAL SHOCK WAVE LITHOTRIPSY (ESWL) Left 10/22/2021    Procedure: EXTRACORPOREAL SHOCKWAVE LITHOTRIPSY;  Surgeon: Milan Motley MD;  Location: [facility];  Service: Urology;  Laterality: Left;    LITHOTRIPSY      NEPHRECTOMY Left 10/2022    RIGHT OOPHORECTOMY      URETEROSCOPY LASER LITHOTRIPSY WITH STENT INSERTION Left 10/01/2021    Procedure: URETEROSCOPY WITH STENT PLACEMENT;  Surgeon: Milan Motley MD;  Location: [facility];  Service: Urology;  Laterality: Left;    URETEROSCOPY LASER LITHOTRIPSY WITH STENT INSERTION Left 2022    Procedure: URETEROSCOPY STENT REMOVAL;  Surgeon: Milan Motley MD;  Location: [facility];  Service: Urology;  Laterality: Left;    URETEROSCOPY LASER LITHOTRIPSY WITH STENT INSERTION Left 2022    Procedure: CYSTOSCOPY RETROGRADE LEFT WITH STENT PLACEMENT;  Surgeon: Milan Motley MD;  Location: [facility];  Service: Urology;  Laterality: Left;     Family History   Problem Relation Age of Onset    Diabetes Father     Hypertension Father     Heart attack Father     Hypertension Mother     No Known Problems Paternal Grandmother     No Known Problems Paternal  "Grandfather     No Known Problems Maternal Grandmother     No Known Problems Maternal Grandfather     No Known Problems Sister     No Known Problems Brother     No Known Problems Daughter     No Known Problems Son     No Known Problems Cousin     Rheum arthritis Neg Hx     Osteoarthritis Neg Hx     Asthma Neg Hx     Heart failure Neg Hx     Hyperlipidemia Neg Hx     Migraines Neg Hx     Rashes / Skin problems Neg Hx     Seizures Neg Hx     Stroke Neg Hx     Thyroid disease Neg Hx      Social History     Tobacco Use    Smoking status: Never     Passive exposure: Never    Smokeless tobacco: Never   Vaping Use    Vaping status: Never Used   Substance Use Topics    Alcohol use: No    Drug use: Never     Comment: Pt has track marks on right arm. Pt states \"It's from my INR being drawn.\"      Review of Systems   Constitutional: Positive for malaise/fatigue.   HENT:  Positive for congestion.    Eyes: Negative.    Cardiovascular:  Positive for chest pain, leg swelling and palpitations.   Respiratory:  Positive for cough, shortness of breath and wheezing.    Hematologic/Lymphatic: Bruises/bleeds easily.   Musculoskeletal:  Positive for back pain.   Gastrointestinal:  Positive for nausea and vomiting.   Genitourinary:  Positive for dysuria and hematuria.   Neurological:  Positive for headaches, light-headedness and numbness.   Psychiatric/Behavioral:  Positive for depression. The patient has insomnia.      Objective   Blood pressure 117/75, pulse 84, height 162.6 cm (64\"), SpO2 96%, not currently breastfeeding.  Body mass index is 55.1 kg/m².    Vitals reviewed.   Constitutional:       Appearance: Healthy appearance. Well-developed and not in distress.   Eyes:      Conjunctiva/sclera: Conjunctivae normal.   HENT:      Head: Normocephalic.   Neck:      Thyroid: No thyromegaly.      Vascular: No JVD or JVR. JVD normal.      Trachea: No tracheal deviation.   Pulmonary:      Effort: Pulmonary effort is normal. No respiratory " distress.      Breath sounds: Normal breath sounds. No wheezing. No rhonchi. No rales.   Chest:      Chest wall: Not tender to palpatation.   Cardiovascular:      PMI at left midclavicular line. Normal rate. Regular rhythm. Normal S1. Normal S2.       Murmurs: There is no murmur.      No gallop.  No click. No rub.   Pulses:     Intact distal pulses.   Edema:     Peripheral edema absent.   Abdominal:      General: Bowel sounds are normal.      Palpations: Abdomen is soft. There is no abdominal mass.      Tenderness: There is no abdominal tenderness.   Musculoskeletal: Normal range of motion.         General: No tenderness.      Cervical back: Normal range of motion and neck supple. Skin:     General: Skin is warm and dry.   Neurological:      General: No focal deficit present.      Mental Status: Alert, oriented to person, place, and time and oriented to person, place and time.      Cranial Nerves: No cranial nerve deficit.      Comments: Has grade 4/5 power in the left upper and lower extremities.     Lab Results   Component Value Date     02/13/2024    K 3.8 02/13/2024    CL 99 02/13/2024    CO2 22.7 02/13/2024    BUN 18 02/13/2024    CREATININE 0.68 02/13/2024    GLUCOSE 241 (H) 02/13/2024    CALCIUM 10.2 02/13/2024    AST 26 02/13/2024    ALT 18 02/13/2024    ALKPHOS 89 02/13/2024    LABIL2 0.9 05/05/2020     Lab Results   Component Value Date    CKTOTAL 85 09/02/2023     Lab Results   Component Value Date    WBC 6.83 02/13/2024    HGB 12.8 02/13/2024    HCT 38.4 02/13/2024     02/13/2024     Lab Results   Component Value Date    INR 0.96 02/18/2024    INR 1.1 12/09/2023    INR 0.95 11/16/2023     Lab Results   Component Value Date    MG 1.5 02/18/2024        Lab Results   Component Value Date    BNP <2 04/05/2016         Assessment & Plan    Diagnosis Plan   1. Precordial pain        2. Essential hypertension        3. Type 2 diabetes mellitus without complication, with long-term current use of  insulin        4. Family history of premature coronary artery disease            Recommendations  No orders of the defined types were placed in this encounter.       I have discussed the results of the nuclear stress test and echo Doppler study with the patient  I will add clopidogrel (in view of multiple coronary risk factors) and isosorbide mononitrate 60 mg daily.  If she continues to have recurrent chest pains then will consider the option of further cardiac evaluation with cardiac catheterization as she would not be candidate for CT coronary angiogram given her BMI of 55.    Return in about 5 weeks (around 4/16/2024) for or sooner if needed.    As always, Dalice ,  I appreciate very much the opportunity to participate in the cardiovascular care of your patients. Please do not hesitate to call me with any questions with regards to Natalia Arzate's evaluation and management.     With Best Regards,        Jon Pereira MD, [facility]    Please note that portions of this note were completed with a voice recognition program.

## 2024-04-16 ENCOUNTER — OFFICE VISIT (OUTPATIENT)
Dept: CARDIOLOGY | Facility: CLINIC | Age: 39
End: 2024-04-16
Payer: COMMERCIAL

## 2024-04-16 VITALS
DIASTOLIC BLOOD PRESSURE: 90 MMHG | BODY MASS INDEX: 50.02 KG/M2 | HEIGHT: 64 IN | WEIGHT: 293 LBS | OXYGEN SATURATION: 96 % | HEART RATE: 98 BPM | SYSTOLIC BLOOD PRESSURE: 134 MMHG

## 2024-04-16 DIAGNOSIS — Z79.4 TYPE 2 DIABETES MELLITUS WITHOUT COMPLICATION, WITH LONG-TERM CURRENT USE OF INSULIN: ICD-10-CM

## 2024-04-16 DIAGNOSIS — R94.39 ABNORMAL NUCLEAR STRESS TEST: ICD-10-CM

## 2024-04-16 DIAGNOSIS — R07.2 PRECORDIAL PAIN: Primary | ICD-10-CM

## 2024-04-16 DIAGNOSIS — Z82.49 FAMILY HISTORY OF PREMATURE CORONARY ARTERY DISEASE: ICD-10-CM

## 2024-04-16 DIAGNOSIS — E11.9 TYPE 2 DIABETES MELLITUS WITHOUT COMPLICATION, WITH LONG-TERM CURRENT USE OF INSULIN: ICD-10-CM

## 2024-04-16 DIAGNOSIS — I10 ESSENTIAL HYPERTENSION: ICD-10-CM

## 2024-04-16 PROCEDURE — 1159F MED LIST DOCD IN RCRD: CPT | Performed by: NURSE PRACTITIONER

## 2024-04-16 PROCEDURE — 1160F RVW MEDS BY RX/DR IN RCRD: CPT | Performed by: NURSE PRACTITIONER

## 2024-04-16 PROCEDURE — 3075F SYST BP GE 130 - 139MM HG: CPT | Performed by: NURSE PRACTITIONER

## 2024-04-16 PROCEDURE — 99213 OFFICE O/P EST LOW 20 MIN: CPT | Performed by: NURSE PRACTITIONER

## 2024-04-16 PROCEDURE — 3080F DIAST BP >= 90 MM HG: CPT | Performed by: NURSE PRACTITIONER

## 2024-04-16 NOTE — PROGRESS NOTES
Bladimir Calle PA-C  Natalia Arzate  1985 04/16/2024    Patient Active Problem List   Diagnosis    Nephrolithiasis    Ovarian teratoma, right    Other chronic pain    Pelvic pain    History of DVT in adulthood    History of pulmonary embolism    Ureteral calculus    Ischemic cerebrovascular accident (CVA)    Primary hypertension    Left lumbar radiculopathy    PTSD (post-traumatic stress disorder)    Acute kidney failure, unspecified    Anemia    Dyslipidemia    Hypothyroid    Other secondary gout, multiple sites    Factor 5 Leiden mutation, heterozygous    Vitamin D deficiency    Vitamin B 12 deficiency    Type 2 diabetes mellitus with hyperglycemia, with long-term current use of insulin    Hoarseness, persistent    Ureterolithiasis    Acute cystitis without hematuria    Hepatic steatosis       Dear Bladimir Calle, JUMA:    Subjective     Chief Complaint   Patient presents with    Follow-up     5 week follow up    Med Management     Med list           History of Present Illness:    Natalia Arzate is a 39 y.o. female with a past medical history of hypertension, diabetes mellitus type 2, non-smoker, history of factor V deficiency, and family history of premature coronary artery disease. She presents today for cardiology follow up. She recently had a stress test that showed breast attenuation artifact defect in the anterior and anterolateral myocardium. Further ischemic evaluation was recommended of symptoms persisted. Patient reports she is still having chest pains. This is in the epigastric area and radiates into the left arm. This usually occurs at night and not necessarily with exertion. Sometimes she has tried warm compresses, but has noticed no improvement with symptoms. She has associated dyspnea. She has been taking Plavix, metoprolol, rosuvastatin and isosorbide. She has not noticed any improvement in symptoms since starting isosorbide. A stress test was negative for evidence of ischemia. Her  Father has had PCI starting in his 50's. She also has a history of hypertension, dyslipidemia, and Diabetes mellitus. She is a non smoker. She denies dye allergy. Most recent creatinine was normal.     She has a history of nephrectomy. She has a history of multiple PE/DVT. Her last embolism was a DVT in 2020 per her report.     Allergies   Allergen Reactions    Toradol [Ketorolac Tromethamine] Anaphylaxis and Hives    Haldol [Haloperidol] Hives and Mental Status Change    Tramadol Hives and Swelling    Amoxicillin Hives and Rash    Penicillins Hives and Rash   :      Current Outpatient Medications:     allopurinol (ZYLOPRIM) 100 MG tablet, Take 1 tablet by mouth Daily., Disp: 30 tablet, Rfl: 3    amLODIPine (NORVASC) 10 MG tablet, Take 1 tablet by mouth Daily., Disp: 30 tablet, Rfl: 3    apixaban (ELIQUIS) 5 MG tablet tablet, Take 1 tablet by mouth 2 (Two) Times a Day., Disp: 60 tablet, Rfl: 3    azelastine (ASTELIN) 0.1 % nasal spray, 2 sprays both nostrils twice daily as needed, Disp: 1 each, Rfl: 3    clopidogrel (PLAVIX) 75 MG tablet, Take 1 tablet by mouth Daily., Disp: 30 tablet, Rfl: 3    Continuous Blood Gluc Sensor (Dexcom G6 Sensor), Every 10 (Ten) Days., Disp: 9 each, Rfl: 3    cyanocobalamin ([employer] Vitamin B-12) 2000 MCG tablet, Take 1 tablet by mouth Daily., Disp: 30 tablet, Rfl: 2    DULoxetine (CYMBALTA) 30 MG capsule, Take 1 capsule by mouth 2 (Two) Times a Day., Disp: 60 capsule, Rfl: 2    gabapentin (NEURONTIN) 300 MG capsule, TAKE ONE CAPSULE BY MOUTH ONCE NIGHTLY AS NEEDED FOR NEUROPATHY, Disp: 30 capsule, Rfl: 0    glucose blood test strip, 1 each by Other route 3 (Three) Times a Day., Disp: 100 each, Rfl: 5    Insulin Glargine (Lantus SoloStar) 100 UNIT/ML injection pen, Maximum daily dose of 75 units., Disp: 24 mL, Rfl: 3    Insulin Lispro, 1 Unit Dial, (HUMALOG) 100 UNIT/ML solution pen-injector, Per sliding scale, Disp: 15 mL, Rfl: 0    Insulin Pen Needle 30G X 8 MM misc, 1 Device 4 (Four)  "Times a Day., Disp: 200 each, Rfl: 0    Insulin Syringe-Needle U-100 30G X 5/16\" 0.5 ML misc, Per sliding scale, Disp: 50 each, Rfl: 0    ipratropium-albuterol (DUO-NEB) 0.5-2.5 mg/3 ml nebulizer, Take 3 mL by nebulization Every 4 (Four) Hours As Needed for Shortness of Air., Disp: 150 mL, Rfl: 2    isosorbide mononitrate (IMDUR) 60 MG 24 hr tablet, Take 1 tablet by mouth Every Morning., Disp: 30 tablet, Rfl: 3    Lancets Micro Thin 33G misc, 1 Device 3 (Three) Times a Day., Disp: 100 each, Rfl: 3    metoprolol succinate XL (TOPROL-XL) 50 MG 24 hr tablet, Take 1 tablet by mouth Daily. (Patient taking differently: Take 1 tablet by mouth 2 (Two) Times a Day.), Disp: 30 tablet, Rfl: 0    mometasone-formoterol (DULERA 200) 200-5 MCG/ACT inhaler, 2 puffs twice daily, Disp: 1 g, Rfl: 2    montelukast (SINGULAIR) 10 MG tablet, Take 1 tablet by mouth Every Night., Disp: 30 tablet, Rfl: 3    Respiratory Therapy Supplies (Nebulizer/Tubing/Mouthpiece) kit, 1 each Take As Directed., Disp: 1 each, Rfl: 1    rosuvastatin (CRESTOR) 20 MG tablet, Take 1 tablet by mouth Every Night. (Patient taking differently: Take 2 tablets by mouth Every Night.), Disp: 30 tablet, Rfl: 5    Tirzepatide (Mounjaro) 7.5 MG/0.5ML solution pen-injector pen, Inject 0.5 mL under the skin into the appropriate area as directed 1 (One) Time Per Week., Disp: , Rfl:     topiramate (TOPAMAX) 25 MG tablet, Take 1 tablet by mouth Daily., Disp: 30 tablet, Rfl: 0    Ventolin  (90 Base) MCG/ACT inhaler, INHALE TWO PUFFS BY MOUTH EVERY 4 HOURS AS NEEDED FOR BREATHING, Disp: 18 g, Rfl: 3      The following portions of the patient's history were reviewed and updated as appropriate: allergies, current medications, past family history, past medical history, past social history, past surgical history and problem list.    Social History     Tobacco Use    Smoking status: Never     Passive exposure: Never    Smokeless tobacco: Never   Vaping Use    Vaping status: " "Never Used   Substance Use Topics    Alcohol use: No    Drug use: Never     Comment: Pt has track marks on right arm. Pt states \"It's from my INR being drawn.\"        Review of Systems   Constitutional: Negative for decreased appetite and malaise/fatigue.   Cardiovascular:  Positive for chest pain and dyspnea on exertion. Negative for palpitations.   Respiratory:  Negative for cough and shortness of breath.        Objective   Vitals:    04/16/24 1304   BP: 134/90   BP Location: Right arm   Patient Position: Sitting   Cuff Size: Large Adult   Pulse: 98   SpO2: 96%   Weight: (!) 143 kg (314 lb 6.4 oz)   Height: 162.6 cm (64.02\")     Body mass index is 53.94 kg/m².        Vitals reviewed.   Constitutional:       Appearance: Healthy appearance. Well-developed and not in distress.   HENT:      Head: Normocephalic and atraumatic.   Neck:      Vascular: No carotid bruit or JVD.   Pulmonary:      Effort: Pulmonary effort is normal.      Breath sounds: Normal breath sounds. No wheezing. No rales.   Cardiovascular:      Normal rate. Regular rhythm.      Murmurs: There is no murmur.      . No S3 and S4 gallop.   Edema:     Peripheral edema absent.   Abdominal:      General: Bowel sounds are normal.      Palpations: Abdomen is soft.   Skin:     General: Skin is warm and dry.   Neurological:      Mental Status: Alert, oriented to person, place, and time and oriented to person, place and time.   Psychiatric:         Mood and Affect: Mood normal.         Behavior: Behavior normal.         Lab Results   Component Value Date     02/13/2024    K 3.8 02/13/2024    CL 99 02/13/2024    CO2 22.7 02/13/2024    BUN 18 02/13/2024    CREATININE 0.68 02/13/2024    GLUCOSE 241 (H) 02/13/2024    CALCIUM 10.2 02/13/2024    AST 26 02/13/2024    ALT 18 02/13/2024    ALKPHOS 89 02/13/2024    LABIL2 0.9 05/05/2020     Lab Results   Component Value Date    WBC 5.21 04/12/2024    HGB 12.3 04/12/2024    HCT 35.7 04/12/2024     04/12/2024 "     Lab Results   Component Value Date    TSH 4.230 (H) 03/28/2023    TRIG 522 (H) 03/28/2023    HDL 40 03/28/2023     (H) 03/28/2023          Results for orders placed during the hospital encounter of 03/07/24    Adult Transthoracic Echo Complete W/ Cont if Necessary Per Protocol    Interpretation Summary    Normal left ventricular cavity size and wall thickness noted. All left ventricular wall segments contract normally.    Left ventricular ejection fraction appears to be 56 - 60%.    The aortic valve is structurally normal with no regurgitation or stenosis present.    The mitral valve is structurally normal with no regurgitation or significant stenosis present.    There is no evidence of pericardial effusion. .     Results for orders placed during the hospital encounter of 03/07/24    Stress test with myocardial perfusion one day    Interpretation Summary    Patient has dense breast attenuation artifact defect in the anterior and the anterolateral myocardium, the defect appears more worse in the rest compared to the stress perfusion images, if there is clinical concern for evaluation of CAD consider coronary CT angiogram.    Left ventricular ejection fraction is normal (Calculated EF = 55%).    Breast attenuation artifact is present.    Findings consistent with a normal ECG stress test.           Procedures      Assessment & Plan    Diagnosis Plan   1. Precordial pain  Ambulatory Referral to Cardiology      2. Essential hypertension  Ambulatory Referral to Cardiology      3. Type 2 diabetes mellitus without complication, with long-term current use of insulin  Ambulatory Referral to Cardiology      4. Family history of premature coronary artery disease  Ambulatory Referral to Cardiology      5. Abnormal nuclear stress test  Ambulatory Referral to Cardiology                   Recommendations:    Precordial pain - persistent chest pains despite medical management with multiple cardiac risk factors. Will refer  to interventional cardiology for consideration of cardiac catheterization. Continue Plavix, metoprolol, and rosuvastatin.  Essential hypertension - BP well controlled.  DM type 2 - follows with PCP for diabetes management.  Follow up in 4 weeks or sooner if needed.         Return in about 4 weeks (around 5/14/2024) for Recheck.    As always, I appreciate very much the opportunity to participate in the cardiovascular care of your patients.      With Best Regards,    YEIMI Tovar

## 2024-04-18 ENCOUNTER — APPOINTMENT (OUTPATIENT)
Dept: CT IMAGING | Facility: HOSPITAL | Age: 39
End: 2024-04-18
Payer: COMMERCIAL

## 2024-04-18 ENCOUNTER — HOSPITAL ENCOUNTER (EMERGENCY)
Facility: HOSPITAL | Age: 39
Discharge: HOME | End: 2024-04-18
Attending: STUDENT IN AN ORGANIZED HEALTH CARE EDUCATION/TRAINING PROGRAM
Payer: COMMERCIAL

## 2024-04-18 VITALS
HEIGHT: 64 IN | DIASTOLIC BLOOD PRESSURE: 97 MMHG | BODY MASS INDEX: 50.02 KG/M2 | TEMPERATURE: 97.9 F | RESPIRATION RATE: 20 BRPM | OXYGEN SATURATION: 99 % | HEART RATE: 99 BPM | SYSTOLIC BLOOD PRESSURE: 135 MMHG | WEIGHT: 293 LBS

## 2024-04-18 DIAGNOSIS — R10.9 RIGHT FLANK PAIN: Primary | ICD-10-CM

## 2024-04-18 LAB
ALBUMIN SERPL-MCNC: 4.2 G/DL (ref 3.5–5.2)
ALBUMIN/GLOB SERPL: 1.4 G/DL
ALP SERPL-CCNC: 98 U/L (ref 39–117)
ALT SERPL W P-5'-P-CCNC: 28 U/L (ref 1–33)
ANION GAP SERPL CALCULATED.3IONS-SCNC: 18.3 MMOL/L (ref 5–15)
AST SERPL-CCNC: 35 U/L (ref 1–32)
BASOPHILS # BLD AUTO: 0.04 10*3/MM3 (ref 0–0.2)
BASOPHILS NFR BLD AUTO: 0.7 % (ref 0–1.5)
BILIRUB SERPL-MCNC: 0.6 MG/DL (ref 0–1.2)
BUN SERPL-MCNC: 17 MG/DL (ref 6–20)
BUN/CREAT SERPL: 26.2 (ref 7–25)
CALCIUM SPEC-SCNC: 10.1 MG/DL (ref 8.6–10.5)
CHLORIDE SERPL-SCNC: 97 MMOL/L (ref 98–107)
CO2 SERPL-SCNC: 19.7 MMOL/L (ref 22–29)
CREAT SERPL-MCNC: 0.65 MG/DL (ref 0.57–1)
CRP SERPL-MCNC: 1.32 MG/DL (ref 0–0.5)
DEPRECATED RDW RBC AUTO: 44.9 FL (ref 37–54)
EGFRCR SERPLBLD CKD-EPI 2021: 115.7 ML/MIN/1.73
EOSINOPHIL # BLD AUTO: 0.15 10*3/MM3 (ref 0–0.4)
EOSINOPHIL NFR BLD AUTO: 2.5 % (ref 0.3–6.2)
ERYTHROCYTE [DISTWIDTH] IN BLOOD BY AUTOMATED COUNT: 14.2 % (ref 12.3–15.4)
GLOBULIN UR ELPH-MCNC: 3.1 GM/DL
GLUCOSE SERPL-MCNC: 294 MG/DL (ref 65–99)
HCT VFR BLD AUTO: 37.9 % (ref 34–46.6)
HGB BLD-MCNC: 12.5 G/DL (ref 12–15.9)
HOLD SPECIMEN: NORMAL
IMM GRANULOCYTES # BLD AUTO: 0.03 10*3/MM3 (ref 0–0.05)
IMM GRANULOCYTES NFR BLD AUTO: 0.5 % (ref 0–0.5)
LIPASE SERPL-CCNC: 57 U/L (ref 13–60)
LYMPHOCYTES # BLD AUTO: 2.06 10*3/MM3 (ref 0.7–3.1)
LYMPHOCYTES NFR BLD AUTO: 34.8 % (ref 19.6–45.3)
MCH RBC QN AUTO: 29.2 PG (ref 26.6–33)
MCHC RBC AUTO-ENTMCNC: 33 G/DL (ref 31.5–35.7)
MCV RBC AUTO: 88.6 FL (ref 79–97)
MONOCYTES # BLD AUTO: 0.52 10*3/MM3 (ref 0.1–0.9)
MONOCYTES NFR BLD AUTO: 8.8 % (ref 5–12)
NEUTROPHILS NFR BLD AUTO: 3.12 10*3/MM3 (ref 1.7–7)
NEUTROPHILS NFR BLD AUTO: 52.7 % (ref 42.7–76)
NRBC BLD AUTO-RTO: 0 /100 WBC (ref 0–0.2)
PLATELET # BLD AUTO: 209 10*3/MM3 (ref 140–450)
PMV BLD AUTO: 9 FL (ref 6–12)
POTASSIUM SERPL-SCNC: 4.3 MMOL/L (ref 3.5–5.2)
PROT SERPL-MCNC: 7.3 G/DL (ref 6–8.5)
RBC # BLD AUTO: 4.28 10*6/MM3 (ref 3.77–5.28)
SODIUM SERPL-SCNC: 135 MMOL/L (ref 136–145)
WBC NRBC COR # BLD AUTO: 5.92 10*3/MM3 (ref 3.4–10.8)
WHOLE BLOOD HOLD COAG: NORMAL
WHOLE BLOOD HOLD SPECIMEN: NORMAL

## 2024-04-18 PROCEDURE — 36415 COLL VENOUS BLD VENIPUNCTURE: CPT

## 2024-04-18 PROCEDURE — 74176 CT ABD & PELVIS W/O CONTRAST: CPT

## 2024-04-18 PROCEDURE — 80053 COMPREHEN METABOLIC PANEL: CPT | Performed by: PHYSICIAN ASSISTANT

## 2024-04-18 PROCEDURE — 85025 COMPLETE CBC W/AUTO DIFF WBC: CPT | Performed by: PHYSICIAN ASSISTANT

## 2024-04-18 PROCEDURE — 86140 C-REACTIVE PROTEIN: CPT | Performed by: PHYSICIAN ASSISTANT

## 2024-04-18 PROCEDURE — 83690 ASSAY OF LIPASE: CPT | Performed by: PHYSICIAN ASSISTANT

## 2024-04-18 PROCEDURE — 99284 EMERGENCY DEPT VISIT MOD MDM: CPT

## 2024-04-18 PROCEDURE — 74176 CT ABD & PELVIS W/O CONTRAST: CPT | Performed by: RADIOLOGY

## 2024-04-18 RX ORDER — HYDROCODONE BITARTRATE AND ACETAMINOPHEN 7.5; 325 MG/1; MG/1
1 TABLET ORAL ONCE
Status: COMPLETED | OUTPATIENT
Start: 2024-04-18 | End: 2024-04-18

## 2024-04-18 RX ADMIN — HYDROCODONE BITARTRATE AND ACETAMINOPHEN 1 TABLET: 7.5; 325 TABLET ORAL at 13:11

## 2024-04-18 NOTE — ED NOTES
"Patient provided urine cup, patient stated \"I can't pee right now.\" Advised need for specimen.  "

## 2024-04-18 NOTE — ED PROVIDER NOTES
"Subjective   History of Present Illness  39 year old female with significant past medical hx for atrial fibrillation chronically anticoagulated with Eliquis, DM, HLD, stroke, ovarian cancer, factor 5 leiden, s/p left nephrectomy, migraines, and DVT presents to the ED with right sided flank pain x few days and not getting any better. Patient states she was seen by urology at  about a weeks ago and was told she had to active kidney stones and is suppose to have surgery in the next couple of weeks for removal if not passed. Patient reports radiating pain to right lower abdomen. Denies dysuria, fever, chills, body aches, hematuria. Pt does endorse nausea with vomiting. Aggravating factors include \"everything\". Denies any alleviating factors. Denies any other complaints or concerns at this time.    Reviewed medical records form  and does appear pt was seen by urology on 04/12/24 and CT at that time did not reveal any nephro or ureterolithiasis.         History provided by:  Patient   used: No        Review of Systems   Constitutional: Negative.  Negative for fever.   Respiratory: Negative.     Cardiovascular: Negative.  Negative for chest pain.   Gastrointestinal:  Positive for abdominal pain (right), nausea and vomiting.   Endocrine: Negative.    Genitourinary:  Positive for flank pain. Negative for dysuria.   Skin: Negative.    Neurological: Negative.    Psychiatric/Behavioral: Negative.     All other systems reviewed and are negative.      Past Medical History:   Diagnosis Date    A-fib     Abnormal ECG     Anemia     Anxiety     Asthma     Cancer     Ovarian    Depression     Diabetes mellitus     DVT (deep venous thrombosis)     Factor 5 Leiden mutation, heterozygous     Fibroid     GERD (gastroesophageal reflux disease)     Gout     H/O abdominal abscess     History of sepsis     History of transfusion     Hyperlipidemia     Hypothyroid     Kidney stone     Migraines     Neuropathy     " Ovarian cancer 2021    Ovarian cyst     PE (pulmonary embolism)     Polycystic ovary syndrome     Preeclampsia     Rh incompatibility     Stroke     TIA (transient ischemic attack)     Urinary tract infection     Varicella        Allergies   Allergen Reactions    Toradol [Ketorolac Tromethamine] Anaphylaxis and Hives    Haldol [Haloperidol] Hives and Mental Status Change    Tramadol Hives and Swelling    Amoxicillin Hives and Rash    Penicillins Hives and Rash       Past Surgical History:   Procedure Laterality Date    ABDOMINAL SURGERY      CARDIAC CATHETERIZATION       SECTION      CHOLECYSTECTOMY      COLONOSCOPY      ENDOSCOPY      EXTRACORPOREAL SHOCK WAVE LITHOTRIPSY (ESWL) Left 10/22/2021    Procedure: EXTRACORPOREAL SHOCKWAVE LITHOTRIPSY;  Surgeon: Milan Motley MD;  Location: [facility];  Service: Urology;  Laterality: Left;    LITHOTRIPSY      NEPHRECTOMY Left 10/2022    RIGHT OOPHORECTOMY      URETEROSCOPY LASER LITHOTRIPSY WITH STENT INSERTION Left 10/01/2021    Procedure: URETEROSCOPY WITH STENT PLACEMENT;  Surgeon: Milan Motley MD;  Location: [facility];  Service: Urology;  Laterality: Left;    URETEROSCOPY LASER LITHOTRIPSY WITH STENT INSERTION Left 2022    Procedure: URETEROSCOPY STENT REMOVAL;  Surgeon: Milan Motley MD;  Location: [facility];  Service: Urology;  Laterality: Left;    URETEROSCOPY LASER LITHOTRIPSY WITH STENT INSERTION Left 2022    Procedure: CYSTOSCOPY RETROGRADE LEFT WITH STENT PLACEMENT;  Surgeon: Milan Motley MD;  Location: [facility];  Service: Urology;  Laterality: Left;       Family History   Problem Relation Age of Onset    Diabetes Father     Hypertension Father     Heart attack Father     Hypertension Mother     No Known Problems Paternal Grandmother     No Known Problems Paternal Grandfather     No Known Problems Maternal Grandmother     No Known Problems Maternal Grandfather     No Known Problems Sister     No  "Known Problems Brother     No Known Problems Daughter     No Known Problems Son     No Known Problems Cousin     Rheum arthritis Neg Hx     Osteoarthritis Neg Hx     Asthma Neg Hx     Heart failure Neg Hx     Hyperlipidemia Neg Hx     Migraines Neg Hx     Rashes / Skin problems Neg Hx     Seizures Neg Hx     Stroke Neg Hx     Thyroid disease Neg Hx        Social History     Socioeconomic History    Marital status:    Tobacco Use    Smoking status: Never     Passive exposure: Never    Smokeless tobacco: Never   Vaping Use    Vaping status: Never Used   Substance and Sexual Activity    Alcohol use: No    Drug use: Never     Comment: Pt has track marks on right arm. Pt states \"It's from my INR being drawn.\"     Sexual activity: Not Currently     Partners: Male     Birth control/protection: None           Objective   Physical Exam  Vitals and nursing note reviewed.   Constitutional:       General: She is not in acute distress.     Appearance: She is well-developed. She is obese. She is not diaphoretic.      Comments: SANGEETA MACIAS:      Head: Normocephalic and atraumatic.      Right Ear: External ear normal.      Left Ear: External ear normal.      Nose: Nose normal.   Eyes:      Conjunctiva/sclera: Conjunctivae normal.      Pupils: Pupils are equal, round, and reactive to light.   Neck:      Vascular: No JVD.      Trachea: No tracheal deviation.   Cardiovascular:      Rate and Rhythm: Normal rate and regular rhythm.      Heart sounds: Normal heart sounds. No murmur heard.  Pulmonary:      Effort: Pulmonary effort is normal. No respiratory distress.      Breath sounds: Normal breath sounds. No wheezing.   Abdominal:      General: Bowel sounds are normal. There is no distension.      Palpations: Abdomen is soft.      Tenderness: There is no abdominal tenderness. There is no right CVA tenderness or left CVA tenderness.   Musculoskeletal:         General: No deformity. Normal range of motion.      Cervical back: " Normal range of motion and neck supple.   Skin:     General: Skin is warm and dry.      Coloration: Skin is not pale.      Findings: No erythema or rash.   Neurological:      Mental Status: She is alert and oriented to person, place, and time.      Cranial Nerves: No cranial nerve deficit.   Psychiatric:         Behavior: Behavior normal.         Thought Content: Thought content normal.         Procedures           ED Course  ED Course as of 04/18/24 1316   Thu Apr 18, 2024   1246 CT Abdomen Pelvis Stone Protocol [TK]      ED Course User Index  [TK] Arjun Alvares PA-C                                   Results for orders placed or performed during the hospital encounter of 04/18/24   Comprehensive Metabolic Panel    Specimen: Blood   Result Value Ref Range    Glucose 294 (H) 65 - 99 mg/dL    BUN 17 6 - 20 mg/dL    Creatinine 0.65 0.57 - 1.00 mg/dL    Sodium 135 (L) 136 - 145 mmol/L    Potassium 4.3 3.5 - 5.2 mmol/L    Chloride 97 (L) 98 - 107 mmol/L    CO2 19.7 (L) 22.0 - 29.0 mmol/L    Calcium 10.1 8.6 - 10.5 mg/dL    Total Protein 7.3 6.0 - 8.5 g/dL    Albumin 4.2 3.5 - 5.2 g/dL    ALT (SGPT) 28 1 - 33 U/L    AST (SGOT) 35 (H) 1 - 32 U/L    Alkaline Phosphatase 98 39 - 117 U/L    Total Bilirubin 0.6 0.0 - 1.2 mg/dL    Globulin 3.1 gm/dL    A/G Ratio 1.4 g/dL    BUN/Creatinine Ratio 26.2 (H) 7.0 - 25.0    Anion Gap 18.3 (H) 5.0 - 15.0 mmol/L    eGFR 115.7 >60.0 mL/min/1.73   C-reactive Protein    Specimen: Blood   Result Value Ref Range    C-Reactive Protein 1.32 (H) 0.00 - 0.50 mg/dL   CBC Auto Differential    Specimen: Blood   Result Value Ref Range    WBC 5.92 3.40 - 10.80 10*3/mm3    RBC 4.28 3.77 - 5.28 10*6/mm3    Hemoglobin 12.5 12.0 - 15.9 g/dL    Hematocrit 37.9 34.0 - 46.6 %    MCV 88.6 79.0 - 97.0 fL    MCH 29.2 26.6 - 33.0 pg    MCHC 33.0 31.5 - 35.7 g/dL    RDW 14.2 12.3 - 15.4 %    RDW-SD 44.9 37.0 - 54.0 fl    MPV 9.0 6.0 - 12.0 fL    Platelets 209 140 - 450 10*3/mm3    Neutrophil % 52.7 42.7 -  "76.0 %    Lymphocyte % 34.8 19.6 - 45.3 %    Monocyte % 8.8 5.0 - 12.0 %    Eosinophil % 2.5 0.3 - 6.2 %    Basophil % 0.7 0.0 - 1.5 %    Immature Grans % 0.5 0.0 - 0.5 %    Neutrophils, Absolute 3.12 1.70 - 7.00 10*3/mm3    Lymphocytes, Absolute 2.06 0.70 - 3.10 10*3/mm3    Monocytes, Absolute 0.52 0.10 - 0.90 10*3/mm3    Eosinophils, Absolute 0.15 0.00 - 0.40 10*3/mm3    Basophils, Absolute 0.04 0.00 - 0.20 10*3/mm3    Immature Grans, Absolute 0.03 0.00 - 0.05 10*3/mm3    nRBC 0.0 0.0 - 0.2 /100 WBC   Lipase    Specimen: Blood   Result Value Ref Range    Lipase 57 13 - 60 U/L   Lavender Top   Result Value Ref Range    Extra Tube hold for add-on    Gold Top - SST   Result Value Ref Range    Extra Tube Hold for add-ons.    Light Blue Top   Result Value Ref Range    Extra Tube Hold for add-ons.        CT Abdomen Pelvis Stone Protocol   Final Result   No hydronephrosis or nephrolithiasis.                   This report was finalized on 4/18/2024 12:38 PM by Jessica Manriquez M.D..                        Medical Decision Making  39 year old female with significant past medical hx for atrial fibrillation chronically anticoagulated with Eliquis, DM, HLD, stroke, ovarian cancer, factor 5 leiden, s/p left nephrectomy, migraines, and DVT presents to the ED with right sided flank pain x few days and not getting any better. Patient states she was seen by urology at  about a weeks ago and was told she had to active kidney stones and is suppose to have surgery in the next couple of weeks for removal if not passed. Patient reports radiating pain to right lower abdomen. Denies dysuria, fever, chills, body aches, hematuria. Pt does endorse nausea with vomiting. Aggravating factors include \"everything\". Denies any alleviating factors. Denies any other complaints or concerns at this time.    Reviewed medical records form  and does appear pt was seen by urology on 04/12/24 and CT at that time did not reveal any nephro or " ureterolithiasis.           Problems Addressed:  Right flank pain: complicated acute illness or injury    Amount and/or Complexity of Data Reviewed  External Data Reviewed: radiology and notes.  Labs: ordered. Decision-making details documented in ED Course.  Radiology: ordered. Decision-making details documented in ED Course.    Risk  Prescription drug management.        Final diagnoses:   Right flank pain       ED Disposition  ED Disposition       ED Disposition   Discharge    Condition   Stable    Comment   --               Kailash Mckee MD  [address] [id number]  [phone]    In 2 days           Medication List        Changed      metoprolol succinate XL 50 MG 24 hr tablet  Commonly known as: TOPROL-XL  Take 1 tablet by mouth Daily.  What changed: when to take this     rosuvastatin 20 MG tablet  Commonly known as: CRESTOR  Take 1 tablet by mouth Every Night.  What changed: how much to take                 Arjun Alvares PA-C  04/18/24 1315

## 2024-04-26 ENCOUNTER — APPOINTMENT (OUTPATIENT)
Dept: CT IMAGING | Facility: HOSPITAL | Age: 39
End: 2024-04-26
Payer: COMMERCIAL

## 2024-04-26 ENCOUNTER — APPOINTMENT (OUTPATIENT)
Dept: ULTRASOUND IMAGING | Facility: HOSPITAL | Age: 39
End: 2024-04-26
Payer: COMMERCIAL

## 2024-04-26 ENCOUNTER — HOSPITAL ENCOUNTER (EMERGENCY)
Facility: HOSPITAL | Age: 39
Discharge: HOME | End: 2024-04-26
Attending: STUDENT IN AN ORGANIZED HEALTH CARE EDUCATION/TRAINING PROGRAM
Payer: COMMERCIAL

## 2024-04-26 VITALS
WEIGHT: 293 LBS | HEIGHT: 64 IN | TEMPERATURE: 99 F | OXYGEN SATURATION: 97 % | SYSTOLIC BLOOD PRESSURE: 155 MMHG | BODY MASS INDEX: 50.02 KG/M2 | HEART RATE: 98 BPM | DIASTOLIC BLOOD PRESSURE: 99 MMHG | RESPIRATION RATE: 18 BRPM

## 2024-04-26 DIAGNOSIS — R10.84 GENERALIZED ABDOMINAL PAIN: Primary | ICD-10-CM

## 2024-04-26 LAB
ALBUMIN SERPL-MCNC: 4.3 G/DL (ref 3.5–5.2)
ALBUMIN/GLOB SERPL: 1 G/DL
ALP SERPL-CCNC: 118 U/L (ref 39–117)
ALT SERPL W P-5'-P-CCNC: 39 U/L (ref 1–33)
ANION GAP SERPL CALCULATED.3IONS-SCNC: 13.9 MMOL/L (ref 5–15)
AST SERPL-CCNC: 67 U/L (ref 1–32)
BASOPHILS # BLD AUTO: 0.05 10*3/MM3 (ref 0–0.2)
BASOPHILS NFR BLD AUTO: 0.9 % (ref 0–1.5)
BILIRUB SERPL-MCNC: 0.4 MG/DL (ref 0–1.2)
BUN SERPL-MCNC: 14 MG/DL (ref 6–20)
BUN/CREAT SERPL: 17.7 (ref 7–25)
CALCIUM SPEC-SCNC: 10.1 MG/DL (ref 8.6–10.5)
CHLORIDE SERPL-SCNC: 96 MMOL/L (ref 98–107)
CO2 SERPL-SCNC: 21.1 MMOL/L (ref 22–29)
CREAT SERPL-MCNC: 0.79 MG/DL (ref 0.57–1)
DEPRECATED RDW RBC AUTO: 45.9 FL (ref 37–54)
EGFRCR SERPLBLD CKD-EPI 2021: 98.3 ML/MIN/1.73
EOSINOPHIL # BLD AUTO: 0.13 10*3/MM3 (ref 0–0.4)
EOSINOPHIL NFR BLD AUTO: 2.4 % (ref 0.3–6.2)
ERYTHROCYTE [DISTWIDTH] IN BLOOD BY AUTOMATED COUNT: 14.6 % (ref 12.3–15.4)
GLOBULIN UR ELPH-MCNC: 4.3 GM/DL
GLUCOSE SERPL-MCNC: 292 MG/DL (ref 65–99)
HCT VFR BLD AUTO: 40.6 % (ref 34–46.6)
HGB BLD-MCNC: 13.3 G/DL (ref 12–15.9)
HOLD SPECIMEN: NORMAL
HOLD SPECIMEN: NORMAL
IMM GRANULOCYTES # BLD AUTO: 0.02 10*3/MM3 (ref 0–0.05)
IMM GRANULOCYTES NFR BLD AUTO: 0.4 % (ref 0–0.5)
LIPASE SERPL-CCNC: 36 U/L (ref 13–60)
LYMPHOCYTES # BLD AUTO: 1.92 10*3/MM3 (ref 0.7–3.1)
LYMPHOCYTES NFR BLD AUTO: 35.2 % (ref 19.6–45.3)
MCH RBC QN AUTO: 29.1 PG (ref 26.6–33)
MCHC RBC AUTO-ENTMCNC: 32.8 G/DL (ref 31.5–35.7)
MCV RBC AUTO: 88.8 FL (ref 79–97)
MONOCYTES # BLD AUTO: 0.46 10*3/MM3 (ref 0.1–0.9)
MONOCYTES NFR BLD AUTO: 8.4 % (ref 5–12)
NEUTROPHILS NFR BLD AUTO: 2.88 10*3/MM3 (ref 1.7–7)
NEUTROPHILS NFR BLD AUTO: 52.7 % (ref 42.7–76)
NRBC BLD AUTO-RTO: 0 /100 WBC (ref 0–0.2)
PLATELET # BLD AUTO: 229 10*3/MM3 (ref 140–450)
PMV BLD AUTO: 9.2 FL (ref 6–12)
POTASSIUM SERPL-SCNC: 5 MMOL/L (ref 3.5–5.2)
PROT SERPL-MCNC: 8.6 G/DL (ref 6–8.5)
RBC # BLD AUTO: 4.57 10*6/MM3 (ref 3.77–5.28)
SODIUM SERPL-SCNC: 131 MMOL/L (ref 136–145)
WBC NRBC COR # BLD AUTO: 5.46 10*3/MM3 (ref 3.4–10.8)
WHOLE BLOOD HOLD COAG: NORMAL
WHOLE BLOOD HOLD SPECIMEN: NORMAL

## 2024-04-26 PROCEDURE — 85025 COMPLETE CBC W/AUTO DIFF WBC: CPT | Performed by: STUDENT IN AN ORGANIZED HEALTH CARE EDUCATION/TRAINING PROGRAM

## 2024-04-26 PROCEDURE — 51798 US URINE CAPACITY MEASURE: CPT

## 2024-04-26 PROCEDURE — 83690 ASSAY OF LIPASE: CPT | Performed by: STUDENT IN AN ORGANIZED HEALTH CARE EDUCATION/TRAINING PROGRAM

## 2024-04-26 PROCEDURE — 25810000003 SODIUM CHLORIDE 0.9 % SOLUTION: Performed by: PHYSICIAN ASSISTANT

## 2024-04-26 PROCEDURE — 74176 CT ABD & PELVIS W/O CONTRAST: CPT | Performed by: RADIOLOGY

## 2024-04-26 PROCEDURE — 76830 TRANSVAGINAL US NON-OB: CPT | Performed by: RADIOLOGY

## 2024-04-26 PROCEDURE — 80053 COMPREHEN METABOLIC PANEL: CPT | Performed by: STUDENT IN AN ORGANIZED HEALTH CARE EDUCATION/TRAINING PROGRAM

## 2024-04-26 PROCEDURE — 99284 EMERGENCY DEPT VISIT MOD MDM: CPT

## 2024-04-26 PROCEDURE — 76830 TRANSVAGINAL US NON-OB: CPT

## 2024-04-26 PROCEDURE — 36415 COLL VENOUS BLD VENIPUNCTURE: CPT

## 2024-04-26 PROCEDURE — 74176 CT ABD & PELVIS W/O CONTRAST: CPT

## 2024-04-26 RX ORDER — ACETAMINOPHEN 500 MG
1000 TABLET ORAL ONCE
Status: COMPLETED | OUTPATIENT
Start: 2024-04-26 | End: 2024-04-26

## 2024-04-26 RX ORDER — SODIUM CHLORIDE 0.9 % (FLUSH) 0.9 %
10 SYRINGE (ML) INJECTION AS NEEDED
Status: DISCONTINUED | OUTPATIENT
Start: 2024-04-26 | End: 2024-04-26 | Stop reason: HOSPADM

## 2024-04-26 RX ADMIN — SODIUM CHLORIDE 1000 ML: 9 INJECTION, SOLUTION INTRAVENOUS at 20:20

## 2024-04-26 RX ADMIN — ACETAMINOPHEN 1000 MG: 500 TABLET ORAL at 14:34

## 2024-04-26 RX ADMIN — SODIUM CHLORIDE 1000 ML: 9 INJECTION, SOLUTION INTRAVENOUS at 16:58

## 2024-04-26 NOTE — ED NOTES
Attempted to straight cath patient for urine. Unsuccessful. Pt bladder scanned at this time and has 89mLs in her bladder.

## 2024-04-26 NOTE — ED NOTES
MEDICAL SCREENING:    Reason for Visit: flank pain     Patient initially seen in triage.  The patient was advised further evaluation and diagnostic testing will be needed, some of the treatment and testing will be initiated in the lobby in order to begin the process.  The patient will be returned to the waiting area for the time being and possibly be re-assessed by a subsequent ED provider.  The patient will be brought back to the treatment area in as timely manner as possible.       Georgie Mckenzie PA  04/26/24 1300

## 2024-04-27 NOTE — ED PROVIDER NOTES
Subjective   History of Present Illness  39-year-old female patient presents to the emergency room today with complaints of left flank pain that is radiating around to the left abdominal wall.  Patient states she is also just hurting all over her abdomen.  She reports that she has been vomiting as well.  She denies any fevers or diarrhea.  Patient states palpation worsens her symptoms.    History provided by:  Patient   used: No        Review of Systems   Constitutional: Negative.    HENT: Negative.     Eyes: Negative.    Respiratory: Negative.     Gastrointestinal:  Positive for abdominal pain, nausea and vomiting.   Endocrine: Negative.    Genitourinary:  Positive for flank pain.   Skin: Negative.    Allergic/Immunologic: Negative.    Neurological: Negative.    Hematological: Negative.    Psychiatric/Behavioral: Negative.     All other systems reviewed and are negative.      Past Medical History:   Diagnosis Date    A-fib     Abnormal ECG     Anemia     Anxiety     Asthma     Cancer     Ovarian    Depression     Diabetes mellitus     DVT (deep venous thrombosis)     Factor 5 Leiden mutation, heterozygous     Fibroid     GERD (gastroesophageal reflux disease)     Gout     H/O abdominal abscess     History of sepsis     History of transfusion     Hyperlipidemia     Hypothyroid     Kidney stone     Migraines     Neuropathy     Ovarian cancer 02/2021    Ovarian cyst     PE (pulmonary embolism)     Polycystic ovary syndrome     Preeclampsia     Rh incompatibility     Stroke     TIA (transient ischemic attack)     Urinary tract infection     Varicella        Allergies   Allergen Reactions    Toradol [Ketorolac Tromethamine] Anaphylaxis and Hives    Haldol [Haloperidol] Hives and Mental Status Change    Tramadol Hives and Swelling    Amoxicillin Hives and Rash    Penicillins Hives and Rash       Past Surgical History:   Procedure Laterality Date    ABDOMINAL SURGERY      CARDIAC CATHETERIZATION        SECTION      CHOLECYSTECTOMY      COLONOSCOPY      ENDOSCOPY      EXTRACORPOREAL SHOCK WAVE LITHOTRIPSY (ESWL) Left 10/22/2021    Procedure: EXTRACORPOREAL SHOCKWAVE LITHOTRIPSY;  Surgeon: Milan Motley MD;  Location: [facility];  Service: Urology;  Laterality: Left;    LITHOTRIPSY      NEPHRECTOMY Left 10/2022    RIGHT OOPHORECTOMY      URETEROSCOPY LASER LITHOTRIPSY WITH STENT INSERTION Left 10/01/2021    Procedure: URETEROSCOPY WITH STENT PLACEMENT;  Surgeon: Milan Motley MD;  Location: [facility];  Service: Urology;  Laterality: Left;    URETEROSCOPY LASER LITHOTRIPSY WITH STENT INSERTION Left 2022    Procedure: URETEROSCOPY STENT REMOVAL;  Surgeon: Milan Motley MD;  Location: [facility];  Service: Urology;  Laterality: Left;    URETEROSCOPY LASER LITHOTRIPSY WITH STENT INSERTION Left 2022    Procedure: CYSTOSCOPY RETROGRADE LEFT WITH STENT PLACEMENT;  Surgeon: Milan Motley MD;  Location: [facility];  Service: Urology;  Laterality: Left;       Family History   Problem Relation Age of Onset    Diabetes Father     Hypertension Father     Heart attack Father     Hypertension Mother     No Known Problems Paternal Grandmother     No Known Problems Paternal Grandfather     No Known Problems Maternal Grandmother     No Known Problems Maternal Grandfather     No Known Problems Sister     No Known Problems Brother     No Known Problems Daughter     No Known Problems Son     No Known Problems Cousin     Rheum arthritis Neg Hx     Osteoarthritis Neg Hx     Asthma Neg Hx     Heart failure Neg Hx     Hyperlipidemia Neg Hx     Migraines Neg Hx     Rashes / Skin problems Neg Hx     Seizures Neg Hx     Stroke Neg Hx     Thyroid disease Neg Hx        Social History     Socioeconomic History    Marital status:    Tobacco Use    Smoking status: Never     Passive exposure: Never    Smokeless tobacco: Never   Vaping Use    Vaping status: Never Used   Substance and  "Sexual Activity    Alcohol use: No    Drug use: Never     Comment: Pt has track marks on right arm. Pt states \"It's from my INR being drawn.\"     Sexual activity: Not Currently     Partners: Male     Birth control/protection: None           Objective   Physical Exam  Vitals and nursing note reviewed.   Constitutional:       General: She is not in acute distress.     Appearance: Normal appearance. She is obese. She is not ill-appearing, toxic-appearing or diaphoretic.      Comments: Patient is in no distress.   HENT:      Head: Normocephalic and atraumatic.      Right Ear: External ear normal.      Left Ear: External ear normal.      Nose: Nose normal.      Mouth/Throat:      Mouth: Mucous membranes are moist.      Pharynx: Oropharynx is clear.   Eyes:      Extraocular Movements: Extraocular movements intact.      Conjunctiva/sclera: Conjunctivae normal.      Pupils: Pupils are equal, round, and reactive to light.   Cardiovascular:      Rate and Rhythm: Normal rate and regular rhythm.      Pulses: Normal pulses.      Heart sounds: Normal heart sounds.   Pulmonary:      Effort: Pulmonary effort is normal.      Breath sounds: Normal breath sounds.   Abdominal:      General: Abdomen is flat. Bowel sounds are normal. There is no distension.      Palpations: Abdomen is soft. There is no mass.      Tenderness: There is no abdominal tenderness. There is no right CVA tenderness, left CVA tenderness, guarding or rebound.      Hernia: No hernia is present.   Musculoskeletal:         General: Normal range of motion.      Cervical back: Normal range of motion and neck supple.   Skin:     General: Skin is warm and dry.      Capillary Refill: Capillary refill takes less than 2 seconds.   Neurological:      General: No focal deficit present.      Mental Status: She is alert and oriented to person, place, and time. Mental status is at baseline.   Psychiatric:         Mood and Affect: Mood normal.         Behavior: Behavior normal.   "       Thought Content: Thought content normal.         Judgment: Judgment normal.         Procedures           ED Course  ED Course as of 04/26/24 2037 Fri Apr 26, 2024 1845 CT Abdomen Pelvis Without Contrast [ML]   1845 US Non-ob Transvaginal [ML]   2015 Pt states she is unable to urinate.  PT bladder scan has 74cc.  I have discussed this case with Dr. Cisneros. He  recommends discharge home. He states you can give another liter before discharge, but nothing else further to do.   [ML]   2027 PT continues to request narcotic pain medication.  I have explained to her that I have no reason to give her that strong of pain medications. She is sitting upright in bed in no distress. Vital stable and WNL. She states that if she is not going to get anything stronger than tylenol she wants to go home. I expressed to her the concern of her not urinating. She is not at all concerned and states she is ready to go home.  I have recommended she stay well hydrated and f/u with her PCP on Monday. I have discussed sx and red flags that would warrant return to the ED. Pt declines waiting until the fluids are complete.   [ML]      ED Course User Index  [ML] Georgie Mckenzie PA                                   Results for orders placed or performed during the hospital encounter of 04/26/24   Comprehensive Metabolic Panel    Specimen: Blood   Result Value Ref Range    Glucose 292 (H) 65 - 99 mg/dL    BUN 14 6 - 20 mg/dL    Creatinine 0.79 0.57 - 1.00 mg/dL    Sodium 131 (L) 136 - 145 mmol/L    Potassium 5.0 3.5 - 5.2 mmol/L    Chloride 96 (L) 98 - 107 mmol/L    CO2 21.1 (L) 22.0 - 29.0 mmol/L    Calcium 10.1 8.6 - 10.5 mg/dL    Total Protein 8.6 (H) 6.0 - 8.5 g/dL    Albumin 4.3 3.5 - 5.2 g/dL    ALT (SGPT) 39 (H) 1 - 33 U/L    AST (SGOT) 67 (H) 1 - 32 U/L    Alkaline Phosphatase 118 (H) 39 - 117 U/L    Total Bilirubin 0.4 0.0 - 1.2 mg/dL    Globulin 4.3 gm/dL    A/G Ratio 1.0 g/dL    BUN/Creatinine Ratio 17.7 7.0 - 25.0     Anion Gap 13.9 5.0 - 15.0 mmol/L    eGFR 98.3 >60.0 mL/min/1.73   Lipase    Specimen: Blood   Result Value Ref Range    Lipase 36 13 - 60 U/L   CBC Auto Differential    Specimen: Blood   Result Value Ref Range    WBC 5.46 3.40 - 10.80 10*3/mm3    RBC 4.57 3.77 - 5.28 10*6/mm3    Hemoglobin 13.3 12.0 - 15.9 g/dL    Hematocrit 40.6 34.0 - 46.6 %    MCV 88.8 79.0 - 97.0 fL    MCH 29.1 26.6 - 33.0 pg    MCHC 32.8 31.5 - 35.7 g/dL    RDW 14.6 12.3 - 15.4 %    RDW-SD 45.9 37.0 - 54.0 fl    MPV 9.2 6.0 - 12.0 fL    Platelets 229 140 - 450 10*3/mm3    Neutrophil % 52.7 42.7 - 76.0 %    Lymphocyte % 35.2 19.6 - 45.3 %    Monocyte % 8.4 5.0 - 12.0 %    Eosinophil % 2.4 0.3 - 6.2 %    Basophil % 0.9 0.0 - 1.5 %    Immature Grans % 0.4 0.0 - 0.5 %    Neutrophils, Absolute 2.88 1.70 - 7.00 10*3/mm3    Lymphocytes, Absolute 1.92 0.70 - 3.10 10*3/mm3    Monocytes, Absolute 0.46 0.10 - 0.90 10*3/mm3    Eosinophils, Absolute 0.13 0.00 - 0.40 10*3/mm3    Basophils, Absolute 0.05 0.00 - 0.20 10*3/mm3    Immature Grans, Absolute 0.02 0.00 - 0.05 10*3/mm3    nRBC 0.0 0.0 - 0.2 /100 WBC   Green Top (Gel)   Result Value Ref Range    Extra Tube Hold for add-ons.    Lavender Top   Result Value Ref Range    Extra Tube hold for add-on    Gold Top - SST   Result Value Ref Range    Extra Tube Hold for add-ons.    Light Blue Top   Result Value Ref Range    Extra Tube Hold for add-ons.      CT Abdomen Pelvis Without Contrast    Result Date: 4/26/2024  1.  Enteritis bowel gas pattern with air-fluid levels throughout small and large bowel. No dilatation or bowel wall thickening identified. 2.  No bowel obstruction. 3.  Diffuse fatty infiltration of liver with hepatomegaly, stable. 4.  Surgical absence left kidney. No right-sided renal or ureteral stones or obstructive uropathy. 5.  Cholecystectomy. 6.  Other incidental/nonacute findings described above.   This report was finalized on 4/26/2024 6:05 PM by Dr. Spencer Hightower MD.      US Non-ob  Transvaginal    Result Date: 4/26/2024  1.  Hysterectomy and surgical absence of right ovary. 2.  Nonvisualization of left ovary. 3.  No pelvic free fluid identified.   This report was finalized on 4/26/2024 2:55 PM by Dr. Spencer Hightower MD.               Medical Decision Making  Problems Addressed:  Generalized abdominal pain: complicated acute illness or injury    Amount and/or Complexity of Data Reviewed  Labs: ordered.  Radiology: ordered. Decision-making details documented in ED Course.    Risk  OTC drugs.  Prescription drug management.        Final diagnoses:   Generalized abdominal pain       ED Disposition  ED Disposition       ED Disposition   Discharge    Condition   Stable    Comment   --               Bladimir Calle, PAJerilynC  [address] [address]  [phone]    Schedule an appointment as soon as possible for a visit in 3 days           Medication List        Changed      metoprolol succinate XL 50 MG 24 hr tablet  Commonly known as: TOPROL-XL  Take 1 tablet by mouth Daily.  What changed: when to take this     rosuvastatin 20 MG tablet  Commonly known as: CRESTOR  Take 1 tablet by mouth Every Night.  What changed: how much to take                 Georgie Mckenzie PA  04/26/24 2038

## 2024-05-01 ENCOUNTER — OFFICE VISIT (OUTPATIENT)
Dept: ENDOCRINOLOGY | Facility: CLINIC | Age: 39
End: 2024-05-01
Payer: COMMERCIAL

## 2024-05-01 ENCOUNTER — SPECIALTY PHARMACY (OUTPATIENT)
Dept: PHARMACY | Facility: HOSPITAL | Age: 39
End: 2024-05-01
Payer: COMMERCIAL

## 2024-05-01 VITALS
OXYGEN SATURATION: 94 % | HEART RATE: 97 BPM | DIASTOLIC BLOOD PRESSURE: 92 MMHG | BODY MASS INDEX: 50.02 KG/M2 | WEIGHT: 293 LBS | SYSTOLIC BLOOD PRESSURE: 152 MMHG | HEIGHT: 64 IN

## 2024-05-01 DIAGNOSIS — E11.65 TYPE 2 DIABETES MELLITUS WITH HYPERGLYCEMIA, WITH LONG-TERM CURRENT USE OF INSULIN: Primary | ICD-10-CM

## 2024-05-01 DIAGNOSIS — D68.51 FACTOR V LEIDEN: Primary | ICD-10-CM

## 2024-05-01 DIAGNOSIS — Z79.4 TYPE 2 DIABETES MELLITUS WITH HYPERGLYCEMIA, WITH LONG-TERM CURRENT USE OF INSULIN: Primary | ICD-10-CM

## 2024-05-01 DIAGNOSIS — D64.9 NORMOCYTIC ANEMIA: ICD-10-CM

## 2024-05-01 PROCEDURE — 1160F RVW MEDS BY RX/DR IN RCRD: CPT | Performed by: NURSE PRACTITIONER

## 2024-05-01 PROCEDURE — 99214 OFFICE O/P EST MOD 30 MIN: CPT | Performed by: NURSE PRACTITIONER

## 2024-05-01 PROCEDURE — 3077F SYST BP >= 140 MM HG: CPT | Performed by: NURSE PRACTITIONER

## 2024-05-01 PROCEDURE — 3080F DIAST BP >= 90 MM HG: CPT | Performed by: NURSE PRACTITIONER

## 2024-05-01 PROCEDURE — 1159F MED LIST DOCD IN RCRD: CPT | Performed by: NURSE PRACTITIONER

## 2024-05-01 RX ORDER — LISINOPRIL 10 MG/1
10 TABLET ORAL DAILY
COMMUNITY

## 2024-05-01 RX ORDER — TIRZEPATIDE 10 MG/.5ML
10 INJECTION, SOLUTION SUBCUTANEOUS WEEKLY
COMMUNITY

## 2024-05-01 NOTE — PROGRESS NOTES
Specialty Pharmacy Patient Management Program  Endocrinology Initial Assessment       Natalia Arzate is a 39 y.o. female with Type 2 Diabetes seen by an Endocrinology provider and enrolled in the Endocrinology Patient Management program offered by [facility].  An initial outreach was conducted, including assessment of therapy appropriateness and specialty medication education for Insulin therapy. The patient was introduced to services offered by [facility], including: regular assessments, refill coordination, curbside pick-up or mail order delivery options, prior authorization maintenance, and financial assistance programs as applicable. The patient was also provided with contact information for the pharmacy team.     Patient was diagnosed with diabetes 18 years ago. She is currently taking Mounjaro 10 mg weekly, Humalog on sliding scale with meals, and Lantus 75 units once daily to control BG. She denies any side effects with therapy and denies any missed doses.     Patient uses Dexcom G6 to monitor BG with readings in the 250s. Patient denies low BG <70.     Complications include: h/o stroke, HTN, HLD, neuropathy, only has 1 kidney    Patient denies personal/family history of thyroid cancer, denies history of pancreatitis, and reports issues with recurrent UTI/yeast infections.     In the past, the patient has tried:     Drug Dose Reason for Discontinuation Notes   Metformin  Weight gain     Ozempic  GI issues                  Insurance Coverage & Financial Support  [employer]     Relevant Past Medical History and Comorbidities  Relevant medical history and concomitant health conditions were discussed with the patient. The patient's chart has been reviewed for relevant past medical history and comorbid health conditions and updated as necessary.   Past Medical History:   Diagnosis Date    A-fib     Abnormal ECG     Anemia     Anxiety     Asthma     Cancer     Ovarian     "Depression     Diabetes mellitus     DVT (deep venous thrombosis)     Factor 5 Leiden mutation, heterozygous     Fibroid     GERD (gastroesophageal reflux disease)     Gout     H/O abdominal abscess     History of sepsis     History of transfusion     Hyperlipidemia     Hypothyroid     Kidney stone     Migraines     Neuropathy     Ovarian cancer 02/2021    Ovarian cyst     PE (pulmonary embolism)     Polycystic ovary syndrome     Preeclampsia     Rh incompatibility     Stroke     TIA (transient ischemic attack)     Urinary tract infection     Varicella      Social History     Socioeconomic History    Marital status:    Tobacco Use    Smoking status: Never     Passive exposure: Never    Smokeless tobacco: Never   Vaping Use    Vaping status: Never Used   Substance and Sexual Activity    Alcohol use: No    Drug use: Never     Comment: Pt has track marks on right arm. Pt states \"It's from my INR being drawn.\"     Sexual activity: Not Currently     Partners: Male     Birth control/protection: None       Problem list reviewed by María Elena Bond RPH on 5/1/2024 at 10:16 AM    Allergies  Known allergies and reactions were discussed with the patient. The patient's chart has been reviewed for  allergy information and updated as necessary.   Allergies   Allergen Reactions    Toradol [Ketorolac Tromethamine] Anaphylaxis and Hives    Haldol [Haloperidol] Hives and Mental Status Change    Tramadol Hives and Swelling    Amoxicillin Hives and Rash    Penicillins Hives and Rash       Allergies reviewed by María Elena Bond RPH on 5/1/2024 at 10:15 AM    Relevant Laboratory Values  A1C Last 3 Results          6/30/2023    04:02 11/7/2023    09:57   HGBA1C Last 3 Results   Hemoglobin A1C 7.3     10.80       Details          This result is from an external source.             Lab Results   Component Value Date    HGBA1C 10.80 (H) 11/07/2023     Lab Results   Component Value Date    GLUCOSE 292 (H) 04/26/2024    CALCIUM 10.1 " 04/26/2024     (L) 04/26/2024    K 5.0 04/26/2024    CO2 21.1 (L) 04/26/2024    CL 96 (L) 04/26/2024    BUN 14 04/26/2024    CREATININE 0.79 04/26/2024    EGFRIFAFRI >60 05/20/2022    EGFRIFNONA >60 05/20/2022    BCR 17.7 04/26/2024    ANIONGAP 13.9 04/26/2024     Lab Results   Component Value Date    CHOL 260 (H) 03/28/2023    TRIG 522 (H) 03/28/2023    HDL 40 03/28/2023     (H) 03/28/2023         Current Medication List  This medication list has been reviewed with the patient and evaluated for any interactions or necessary modifications/recommendations, and updated to include all prescription medications, OTC medications, and supplements the patient is currently taking.  This list reflects what is contained in the patient's profile, which has also been marked as reviewed to communicate to other providers it is the most up to date version of the patient's current medication therapy.     Current Outpatient Medications:     allopurinol (ZYLOPRIM) 100 MG tablet, Take 1 tablet by mouth Daily., Disp: 30 tablet, Rfl: 3    amLODIPine (NORVASC) 10 MG tablet, Take 1 tablet by mouth Daily., Disp: 30 tablet, Rfl: 3    apixaban (ELIQUIS) 5 MG tablet tablet, Take 1 tablet by mouth 2 (Two) Times a Day., Disp: 60 tablet, Rfl: 3    azelastine (ASTELIN) 0.1 % nasal spray, 2 sprays both nostrils twice daily as needed, Disp: 1 each, Rfl: 3    clopidogrel (PLAVIX) 75 MG tablet, Take 1 tablet by mouth Daily., Disp: 30 tablet, Rfl: 3    Continuous Blood Gluc Sensor (Dexcom G6 Sensor), Every 10 (Ten) Days., Disp: 9 each, Rfl: 3    cyanocobalamin ([employer] Vitamin B-12) 2000 MCG tablet, Take 1 tablet by mouth Daily., Disp: 30 tablet, Rfl: 2    DULoxetine (CYMBALTA) 30 MG capsule, Take 1 capsule by mouth 2 (Two) Times a Day., Disp: 60 capsule, Rfl: 2    gabapentin (NEURONTIN) 300 MG capsule, TAKE ONE CAPSULE BY MOUTH ONCE NIGHTLY AS NEEDED FOR NEUROPATHY (Patient taking differently: Take 1 capsule by mouth 3 (Three) Times a Day.),  Disp: 30 capsule, Rfl: 0    glucose blood test strip, 1 each by Other route 3 (Three) Times a Day., Disp: 100 each, Rfl: 5    Insulin Glargine (Lantus SoloStar) 100 UNIT/ML injection pen, Maximum daily dose of 75 units., Disp: 24 mL, Rfl: 3    Insulin Lispro, 1 Unit Dial, (HUMALOG) 100 UNIT/ML solution pen-injector, Per sliding scale, Disp: 15 mL, Rfl: 0    Insulin Pen Needle 30G X 8 MM misc, 1 Device 4 (Four) Times a Day., Disp: 200 each, Rfl: 0    ipratropium-albuterol (DUO-NEB) 0.5-2.5 mg/3 ml nebulizer, Take 3 mL by nebulization Every 4 (Four) Hours As Needed for Shortness of Air., Disp: 150 mL, Rfl: 2    isosorbide mononitrate (IMDUR) 60 MG 24 hr tablet, Take 1 tablet by mouth Every Morning., Disp: 30 tablet, Rfl: 3    Lancets Micro Thin 33G misc, 1 Device 3 (Three) Times a Day., Disp: 100 each, Rfl: 3    lisinopril (PRINIVIL,ZESTRIL) 10 MG tablet, Take 1 tablet by mouth Daily., Disp: , Rfl:     metoprolol succinate XL (TOPROL-XL) 50 MG 24 hr tablet, Take 1 tablet by mouth Daily. (Patient taking differently: Take 1 tablet by mouth 2 (Two) Times a Day.), Disp: 30 tablet, Rfl: 0    mometasone-formoterol (DULERA 200) 200-5 MCG/ACT inhaler, 2 puffs twice daily, Disp: 1 g, Rfl: 2    montelukast (SINGULAIR) 10 MG tablet, Take 1 tablet by mouth Every Night., Disp: 30 tablet, Rfl: 3    Respiratory Therapy Supplies (Nebulizer/Tubing/Mouthpiece) kit, 1 each Take As Directed., Disp: 1 each, Rfl: 1    rosuvastatin (CRESTOR) 20 MG tablet, Take 1 tablet by mouth Every Night. (Patient taking differently: Take 2 tablets by mouth Every Night.), Disp: 30 tablet, Rfl: 5    Tirzepatide (Mounjaro) 10 MG/0.5ML solution pen-injector pen, Inject 0.5 mL under the skin into the appropriate area as directed 1 (One) Time Per Week., Disp: , Rfl:     topiramate (TOPAMAX) 25 MG tablet, Take 1 tablet by mouth Daily., Disp: 30 tablet, Rfl: 0    Ventolin  (90 Base) MCG/ACT inhaler, INHALE TWO PUFFS BY MOUTH EVERY 4 HOURS AS NEEDED FOR  BREATHING, Disp: 18 g, Rfl: 3  No current facility-administered medications for this visit.    Medicines reviewed by María Elena Bond RP on 5/1/2024 at 10:23 AM    Drug Interactions  No significant drug-drug interactions with diabetes medications expected according to literature.  The hypoglycemic effect of Insulin may be increased or prolonged by metoprolol  Taking clopidogrel + Eliquis increases risk of bleeding. Pt denies any signs/symptoms of bleeding at this time.     Recommended Medications Assessment  Aspirin -  Not Taking Currently (on Eliquis)  Statin -  Currently Taking   ACEi/ARB - Currently Taking     Current 10-Year ASCVD Risk: pt has clinical ASCVD    Adherence and Self-Administration  Adherence related to the patient's specialty therapy was discussed with the patient. The Adherence segment of this outreach has been reviewed and updated.              Barriers to Patient Adherence and/or Self-Administration: None   Methods for Supporting Patient Adherence and/or Self-Administration: None required    Goals of Therapy  Goals related to the patient's specialty therapy were discussed with the patient. The Patient Goals segment of this outreach has been reviewed and updated.    Goals Addressed Today        Consistently take medications as prescribed      HEMOGLOBIN A1C < 7      4/25/24 - 9.9%            Reassessment Plan & Follow-Up  Patient's diabetes is above goal with A1C of 9.9%.  Medication Therapy Changes:  None discussed with patient. Provider to make appropriate adjustments to insulin dosing in clinic today.   Related Plans, Therapy Recommendations or Therapy Problems to Be Addressed: Recommend continuation of dose titration with Mounjaro as appropriate. Patient reports tolerating medication and has seen improvements with therapy.      Patient does not wish to use [facility] at this time due to: loyalty to independent pharmacy     Attestation  I attest the patient was actively involved in  and has agreed to the above plan of care. If the prescribed therapy is at any point deemed not appropriate based on the current or future assessments, a consultation will be initiated with the patient's specialty care provider to determine the best course of action. The revised plan of therapy will be documented along with any required assessments and/or additional patient education provided..    María Elena Bond, PharmD, [facility]  Clinical Specialty Pharmacist, Endocrinology  5/1/2024  12:14 EDT

## 2024-05-01 NOTE — PROGRESS NOTES
Chief Complaint   Patient presents with    Diabetes        Referring Provider  Destiny Nuñez PA-C HPI   Natalia Arzate is a 39 y.o. female had concerns including Diabetes.    Seen as new patient.  T2DM.    Diabetes was diagnosed GDM in , T2DM thereafter.  Complications include neuropathy, stroke-.  Last ophtho exam was .  Current medications for diabetes include Lantus 75 units QAM, Humalog SS 15 units TID with meals, Mounjaro 10 mg weekly-has only had one injection of this dose.    She checks her blood sugar Dexcom CGM times per day.   Hypos: none    ACE/ARB:yes, Statin: yes  Labs:  A1c: 9.9 (2024)  Lipid Panel:utd  SAVANAH: utd    The following portions of the patient's history were reviewed and updated as appropriate: allergies, current medications, past family history, past medical history, past social history, past surgical history, and problem list.    Diet: Tries to limit her carbs and sugars.    Past Medical History:   Diagnosis Date    A-fib     Abnormal ECG     Anemia     Anxiety     Asthma     Cancer     Ovarian    Depression     Diabetes mellitus     DVT (deep venous thrombosis)     Factor 5 Leiden mutation, heterozygous     Fibroid     GERD (gastroesophageal reflux disease)     Gout     H/O abdominal abscess     History of sepsis     History of transfusion     Hyperlipidemia     Hypothyroid     Kidney stone     Migraines     Neuropathy     Ovarian cancer 2021    Ovarian cyst     PE (pulmonary embolism)     Polycystic ovary syndrome     Preeclampsia     Rh incompatibility     Stroke     TIA (transient ischemic attack)     Urinary tract infection     Varicella      Past Surgical History:   Procedure Laterality Date    ABDOMINAL SURGERY      CARDIAC CATHETERIZATION       SECTION      CHOLECYSTECTOMY      COLONOSCOPY      ENDOSCOPY      EXTRACORPOREAL SHOCK WAVE LITHOTRIPSY (ESWL) Left 10/22/2021    Procedure: EXTRACORPOREAL SHOCKWAVE LITHOTRIPSY;  Surgeon: Tolu  "Milan KIRKLAND MD;  Location: [facility];  Service: Urology;  Laterality: Left;    LITHOTRIPSY      NEPHRECTOMY Left 10/2022    RIGHT OOPHORECTOMY      URETEROSCOPY LASER LITHOTRIPSY WITH STENT INSERTION Left 10/01/2021    Procedure: URETEROSCOPY WITH STENT PLACEMENT;  Surgeon: Milan Motley MD;  Location: [facility];  Service: Urology;  Laterality: Left;    URETEROSCOPY LASER LITHOTRIPSY WITH STENT INSERTION Left 04/27/2022    Procedure: URETEROSCOPY STENT REMOVAL;  Surgeon: Milan Motley MD;  Location: [facility];  Service: Urology;  Laterality: Left;    URETEROSCOPY LASER LITHOTRIPSY WITH STENT INSERTION Left 06/29/2022    Procedure: CYSTOSCOPY RETROGRADE LEFT WITH STENT PLACEMENT;  Surgeon: Milan Motley MD;  Location: [facility];  Service: Urology;  Laterality: Left;      Family History   Problem Relation Age of Onset    Diabetes Father     Hypertension Father     Heart attack Father     Hypertension Mother     No Known Problems Paternal Grandmother     No Known Problems Paternal Grandfather     No Known Problems Maternal Grandmother     No Known Problems Maternal Grandfather     No Known Problems Sister     No Known Problems Brother     No Known Problems Daughter     No Known Problems Son     No Known Problems Cousin     Rheum arthritis Neg Hx     Osteoarthritis Neg Hx     Asthma Neg Hx     Heart failure Neg Hx     Hyperlipidemia Neg Hx     Migraines Neg Hx     Rashes / Skin problems Neg Hx     Seizures Neg Hx     Stroke Neg Hx     Thyroid disease Neg Hx       Social History     Socioeconomic History    Marital status:    Tobacco Use    Smoking status: Never     Passive exposure: Never    Smokeless tobacco: Never   Vaping Use    Vaping status: Never Used   Substance and Sexual Activity    Alcohol use: No    Drug use: Never     Comment: Pt has track marks on right arm. Pt states \"It's from my INR being drawn.\"     Sexual activity: Not Currently     Partners: Male     Birth " control/protection: None      Allergies   Allergen Reactions    Toradol [Ketorolac Tromethamine] Anaphylaxis and Hives    Haldol [Haloperidol] Hives and Mental Status Change    Tramadol Hives and Swelling    Amoxicillin Hives and Rash    Penicillins Hives and Rash      Current Outpatient Medications on File Prior to Visit   Medication Sig Dispense Refill    allopurinol (ZYLOPRIM) 100 MG tablet Take 1 tablet by mouth Daily. 30 tablet 3    amLODIPine (NORVASC) 10 MG tablet Take 1 tablet by mouth Daily. 30 tablet 3    apixaban (ELIQUIS) 5 MG tablet tablet Take 1 tablet by mouth 2 (Two) Times a Day. 60 tablet 3    azelastine (ASTELIN) 0.1 % nasal spray 2 sprays both nostrils twice daily as needed 1 each 3    clopidogrel (PLAVIX) 75 MG tablet Take 1 tablet by mouth Daily. 30 tablet 3    Continuous Blood Gluc Sensor (Dexcom G6 Sensor) Every 10 (Ten) Days. 9 each 3    cyanocobalamin ([employer] Vitamin B-12) 2000 MCG tablet Take 1 tablet by mouth Daily. 30 tablet 2    DULoxetine (CYMBALTA) 30 MG capsule Take 1 capsule by mouth 2 (Two) Times a Day. 60 capsule 2    gabapentin (NEURONTIN) 300 MG capsule TAKE ONE CAPSULE BY MOUTH ONCE NIGHTLY AS NEEDED FOR NEUROPATHY (Patient taking differently: Take 1 capsule by mouth 3 (Three) Times a Day.) 30 capsule 0    glucose blood test strip 1 each by Other route 3 (Three) Times a Day. 100 each 5    Insulin Glargine (Lantus SoloStar) 100 UNIT/ML injection pen Maximum daily dose of 75 units. 24 mL 3    Insulin Lispro, 1 Unit Dial, (HUMALOG) 100 UNIT/ML solution pen-injector Per sliding scale 15 mL 0    Insulin Pen Needle 30G X 8 MM misc 1 Device 4 (Four) Times a Day. 200 each 0    ipratropium-albuterol (DUO-NEB) 0.5-2.5 mg/3 ml nebulizer Take 3 mL by nebulization Every 4 (Four) Hours As Needed for Shortness of Air. 150 mL 2    isosorbide mononitrate (IMDUR) 60 MG 24 hr tablet Take 1 tablet by mouth Every Morning. 30 tablet 3    Lancets Micro Thin 33G misc 1 Device 3 (Three) Times a Day. 100  "each 3    metoprolol succinate XL (TOPROL-XL) 50 MG 24 hr tablet Take 1 tablet by mouth Daily. (Patient taking differently: Take 1 tablet by mouth 2 (Two) Times a Day.) 30 tablet 0    mometasone-formoterol (DULERA 200) 200-5 MCG/ACT inhaler 2 puffs twice daily 1 g 2    montelukast (SINGULAIR) 10 MG tablet Take 1 tablet by mouth Every Night. 30 tablet 3    Respiratory Therapy Supplies (Nebulizer/Tubing/Mouthpiece) kit 1 each Take As Directed. 1 each 1    rosuvastatin (CRESTOR) 20 MG tablet Take 1 tablet by mouth Every Night. (Patient taking differently: Take 2 tablets by mouth Every Night.) 30 tablet 5    topiramate (TOPAMAX) 25 MG tablet Take 1 tablet by mouth Daily. 30 tablet 0    Ventolin  (90 Base) MCG/ACT inhaler INHALE TWO PUFFS BY MOUTH EVERY 4 HOURS AS NEEDED FOR BREATHING 18 g 3    [DISCONTINUED] Insulin Syringe-Needle U-100 30G X 5/16\" 0.5 ML misc Per sliding scale 50 each 0    [DISCONTINUED] Tirzepatide (Mounjaro) 7.5 MG/0.5ML solution pen-injector pen Inject 10 mg under the skin into the appropriate area as directed 1 (One) Time Per Week.       No current facility-administered medications on file prior to visit.        Review of Systems   Constitutional:  Positive for fatigue. Negative for unexpected weight gain and unexpected weight loss.   Eyes: Negative.    Endocrine: Positive for polydipsia. Negative for polyphagia and polyuria.   Psychiatric/Behavioral:  Negative for sleep disturbance.    All other systems reviewed and are negative.    /92 (BP Location: Right arm, Patient Position: Sitting, Cuff Size: Adult)   Pulse 97   Ht 162.6 cm (64\")   Wt (!) 139 kg (306 lb) Comment: pt repoted. pt in wheelchair  LMP  (LMP Unknown) Comment: last menses 6 years ago (HCG negative today)  SpO2 94%   BMI 52.52 kg/m²      Physical Exam  Vitals reviewed.   Constitutional:       Appearance: Normal appearance. She is obese.      Comments: Is using wheelchair.   Eyes:      Extraocular Movements: " "Extraocular movements intact.   Cardiovascular:      Rate and Rhythm: Tachycardia present.   Pulmonary:      Effort: Pulmonary effort is normal.   Neurological:      General: No focal deficit present.      Mental Status: She is alert and oriented to person, place, and time.   Psychiatric:         Mood and Affect: Mood normal.         Behavior: Behavior normal.         Thought Content: Thought content normal.         Judgment: Judgment normal.       CMP:  Lab Results   Component Value Date     (H) 05/20/2022    BUN 14 04/26/2024    CREATININE 0.79 04/26/2024    EGFRIFNONA >60 05/20/2022    EGFRIFAFRI >60 05/20/2022    BCR 17.7 04/26/2024     (L) 04/26/2024    K 5.0 04/26/2024    CO2 21.1 (L) 04/26/2024    CALCIUM 10.1 04/26/2024    ALBUMIN 4.3 04/26/2024    LABIL2 0.9 05/05/2020    BILITOT 0.4 04/26/2024    ALKPHOS 118 (H) 04/26/2024    AST 67 (H) 04/26/2024    ALT 39 (H) 04/26/2024     Lipid Panel:  Lab Results   Component Value Date    CHOL 260 (H) 03/28/2023    TRIG 522 (H) 03/28/2023    HDL 40 03/28/2023    VLDL 94 (H) 03/28/2023     (H) 03/28/2023     HbA1c:  Lab Results   Component Value Date    HGBA1C 10.80 (H) 11/07/2023    HGBA1C 7.3 (H) 06/30/2023     Glucose:  Lab Results   Component Value Date    POCGLU 301 (H) 12/28/2023     Microalbumin:  No results found for: \"MALBCRERATIO\"  TSH:  Lab Results   Component Value Date    TSH 4.230 (H) 03/28/2023       Assessment and Plan    Diagnoses and all orders for this visit:    1. Type 2 diabetes mellitus with hyperglycemia, with long-term current use of insulin (Primary)  Assessment & Plan:  -Diabetes is above goal with A1c 9.9.  -Discussed dietary and exercise guidelines with patient and family.  -Discussed the importance of yearly eye exams.  -Discussed the importance of checking BG's regularly. Continue using CGM.  She did not have her  in office for download.   -Continue Lantus 75 units QAM.  -Continue Humalog SS 15 units TID with " meals.  -Continue Mounjaro 10 mg weekly. Patient has no personal history of pancreatitis, no family history of MEN syndrome or medullary thyroid cancer. Possible side effects including nausea, bloating, other GI upset and rarely pancreatitis were discussed. She was advised to call the office with any symptoms or concerns.   -S/S hypoglycemia reviewed with Rule of 15's advised.  -Follow-up in 3 months.     Orders:  -     Ambulatory Referral to Diabetic Education         Return in about 3 months (around 8/1/2024) for Follow-up appointment, A1C. The patient was instructed to contact the clinic with any interval questions or concerns.        This document has been electronically signed by YEIMI Quintana  May 1, 2024 12:53 EDT   Endocrinology    Please note that portions of this document were completed with a voice recognition program. Efforts were made to edit the dictations, but occasionally words are mis-transcribed.

## 2024-05-01 NOTE — ASSESSMENT & PLAN NOTE
-Diabetes is above goal with A1c 9.9.  -Discussed dietary and exercise guidelines with patient and family.  -Discussed the importance of yearly eye exams.  -Discussed the importance of checking BG's regularly. Continue using CGM.  She did not have her  in office for download.   -Continue Lantus 75 units QAM.  -Continue Humalog SS 15 units TID with meals.  -Continue Mounjaro 10 mg weekly. Patient has no personal history of pancreatitis, no family history of MEN syndrome or medullary thyroid cancer. Possible side effects including nausea, bloating, other GI upset and rarely pancreatitis were discussed. She was advised to call the office with any symptoms or concerns.   -S/S hypoglycemia reviewed with Rule of 15's advised.  -Follow-up in 3 months.

## 2024-05-06 ENCOUNTER — LAB (OUTPATIENT)
Dept: ONCOLOGY | Facility: CLINIC | Age: 39
End: 2024-05-06
Payer: COMMERCIAL

## 2024-05-06 ENCOUNTER — OFFICE VISIT (OUTPATIENT)
Dept: ONCOLOGY | Facility: CLINIC | Age: 39
End: 2024-05-06
Payer: COMMERCIAL

## 2024-05-06 VITALS
WEIGHT: 293 LBS | HEIGHT: 64 IN | SYSTOLIC BLOOD PRESSURE: 137 MMHG | HEART RATE: 92 BPM | TEMPERATURE: 97.7 F | BODY MASS INDEX: 50.02 KG/M2 | DIASTOLIC BLOOD PRESSURE: 96 MMHG | RESPIRATION RATE: 18 BRPM | OXYGEN SATURATION: 96 %

## 2024-05-06 DIAGNOSIS — D64.9 NORMOCYTIC ANEMIA: ICD-10-CM

## 2024-05-06 DIAGNOSIS — D68.51 FACTOR V LEIDEN: ICD-10-CM

## 2024-05-06 DIAGNOSIS — D68.51 FACTOR V LEIDEN: Primary | ICD-10-CM

## 2024-05-06 LAB
BASOPHILS # BLD AUTO: 0.03 10*3/MM3 (ref 0–0.2)
BASOPHILS NFR BLD AUTO: 0.6 % (ref 0–1.5)
CRP SERPL-MCNC: 1.67 MG/DL (ref 0–0.5)
DEPRECATED RDW RBC AUTO: 45 FL (ref 37–54)
EOSINOPHIL # BLD AUTO: 0.16 10*3/MM3 (ref 0–0.4)
EOSINOPHIL NFR BLD AUTO: 3.2 % (ref 0.3–6.2)
ERYTHROCYTE [DISTWIDTH] IN BLOOD BY AUTOMATED COUNT: 14 % (ref 12.3–15.4)
FERRITIN SERPL-MCNC: 103.4 NG/ML (ref 13–150)
HCT VFR BLD AUTO: 38.4 % (ref 34–46.6)
HGB BLD-MCNC: 12.6 G/DL (ref 12–15.9)
IMM GRANULOCYTES # BLD AUTO: 0.02 10*3/MM3 (ref 0–0.05)
IMM GRANULOCYTES NFR BLD AUTO: 0.4 % (ref 0–0.5)
IRON 24H UR-MRATE: 70 MCG/DL (ref 37–145)
IRON SATN MFR SERPL: 14 % (ref 20–50)
LYMPHOCYTES # BLD AUTO: 1.64 10*3/MM3 (ref 0.7–3.1)
LYMPHOCYTES NFR BLD AUTO: 32.4 % (ref 19.6–45.3)
MCH RBC QN AUTO: 29.1 PG (ref 26.6–33)
MCHC RBC AUTO-ENTMCNC: 32.8 G/DL (ref 31.5–35.7)
MCV RBC AUTO: 88.7 FL (ref 79–97)
MONOCYTES # BLD AUTO: 0.44 10*3/MM3 (ref 0.1–0.9)
MONOCYTES NFR BLD AUTO: 8.7 % (ref 5–12)
NEUTROPHILS NFR BLD AUTO: 2.77 10*3/MM3 (ref 1.7–7)
NEUTROPHILS NFR BLD AUTO: 54.7 % (ref 42.7–76)
NRBC BLD AUTO-RTO: 0 /100 WBC (ref 0–0.2)
PLATELET # BLD AUTO: 206 10*3/MM3 (ref 140–450)
PMV BLD AUTO: 9.2 FL (ref 6–12)
RBC # BLD AUTO: 4.33 10*6/MM3 (ref 3.77–5.28)
TIBC SERPL-MCNC: 493 MCG/DL (ref 298–536)
TRANSFERRIN SERPL-MCNC: 331 MG/DL (ref 200–360)
WBC NRBC COR # BLD AUTO: 5.06 10*3/MM3 (ref 3.4–10.8)

## 2024-05-06 PROCEDURE — 1159F MED LIST DOCD IN RCRD: CPT | Performed by: INTERNAL MEDICINE

## 2024-05-06 PROCEDURE — 1160F RVW MEDS BY RX/DR IN RCRD: CPT | Performed by: INTERNAL MEDICINE

## 2024-05-06 PROCEDURE — 36415 COLL VENOUS BLD VENIPUNCTURE: CPT | Performed by: INTERNAL MEDICINE

## 2024-05-06 PROCEDURE — 3075F SYST BP GE 130 - 139MM HG: CPT | Performed by: INTERNAL MEDICINE

## 2024-05-06 PROCEDURE — 84466 ASSAY OF TRANSFERRIN: CPT | Performed by: INTERNAL MEDICINE

## 2024-05-06 PROCEDURE — 86140 C-REACTIVE PROTEIN: CPT | Performed by: INTERNAL MEDICINE

## 2024-05-06 PROCEDURE — 82728 ASSAY OF FERRITIN: CPT | Performed by: INTERNAL MEDICINE

## 2024-05-06 PROCEDURE — 99214 OFFICE O/P EST MOD 30 MIN: CPT | Performed by: INTERNAL MEDICINE

## 2024-05-06 PROCEDURE — 3080F DIAST BP >= 90 MM HG: CPT | Performed by: INTERNAL MEDICINE

## 2024-05-06 PROCEDURE — 85025 COMPLETE CBC W/AUTO DIFF WBC: CPT | Performed by: INTERNAL MEDICINE

## 2024-05-06 PROCEDURE — 1125F AMNT PAIN NOTED PAIN PRSNT: CPT | Performed by: INTERNAL MEDICINE

## 2024-05-06 PROCEDURE — 83540 ASSAY OF IRON: CPT | Performed by: INTERNAL MEDICINE

## 2024-05-13 ENCOUNTER — HOSPITAL ENCOUNTER (EMERGENCY)
Facility: HOSPITAL | Age: 39
Discharge: LEFT BEFORE DISCHARGE | End: 2024-05-13
Attending: STUDENT IN AN ORGANIZED HEALTH CARE EDUCATION/TRAINING PROGRAM | Admitting: STUDENT IN AN ORGANIZED HEALTH CARE EDUCATION/TRAINING PROGRAM
Payer: COMMERCIAL

## 2024-05-13 ENCOUNTER — APPOINTMENT (OUTPATIENT)
Dept: CT IMAGING | Facility: HOSPITAL | Age: 39
End: 2024-05-13
Payer: COMMERCIAL

## 2024-05-13 VITALS
DIASTOLIC BLOOD PRESSURE: 93 MMHG | RESPIRATION RATE: 17 BRPM | SYSTOLIC BLOOD PRESSURE: 116 MMHG | BODY MASS INDEX: 50.02 KG/M2 | WEIGHT: 293 LBS | TEMPERATURE: 97.5 F | HEART RATE: 95 BPM | OXYGEN SATURATION: 93 % | HEIGHT: 64 IN

## 2024-05-13 DIAGNOSIS — R10.9 LEFT SIDED ABDOMINAL PAIN: Primary | ICD-10-CM

## 2024-05-13 DIAGNOSIS — R11.2 NAUSEA AND VOMITING, UNSPECIFIED VOMITING TYPE: ICD-10-CM

## 2024-05-13 LAB
ALBUMIN SERPL-MCNC: 4.4 G/DL (ref 3.5–5.2)
ALBUMIN/GLOB SERPL: 1.2 G/DL
ALP SERPL-CCNC: 121 U/L (ref 39–117)
ALT SERPL W P-5'-P-CCNC: 35 U/L (ref 1–33)
ANION GAP SERPL CALCULATED.3IONS-SCNC: 15.9 MMOL/L (ref 5–15)
AST SERPL-CCNC: 47 U/L (ref 1–32)
BASOPHILS # BLD AUTO: 0.05 10*3/MM3 (ref 0–0.2)
BASOPHILS NFR BLD AUTO: 0.9 % (ref 0–1.5)
BILIRUB SERPL-MCNC: 0.5 MG/DL (ref 0–1.2)
BUN SERPL-MCNC: 14 MG/DL (ref 6–20)
BUN/CREAT SERPL: 18.2 (ref 7–25)
CALCIUM SPEC-SCNC: 10.2 MG/DL (ref 8.6–10.5)
CHLORIDE SERPL-SCNC: 95 MMOL/L (ref 98–107)
CO2 SERPL-SCNC: 23.1 MMOL/L (ref 22–29)
CREAT SERPL-MCNC: 0.77 MG/DL (ref 0.57–1)
DEPRECATED RDW RBC AUTO: 45.1 FL (ref 37–54)
EGFRCR SERPLBLD CKD-EPI 2021: 101.4 ML/MIN/1.73
EOSINOPHIL # BLD AUTO: 0.14 10*3/MM3 (ref 0–0.4)
EOSINOPHIL NFR BLD AUTO: 2.6 % (ref 0.3–6.2)
ERYTHROCYTE [DISTWIDTH] IN BLOOD BY AUTOMATED COUNT: 14.3 % (ref 12.3–15.4)
GLOBULIN UR ELPH-MCNC: 3.8 GM/DL
GLUCOSE SERPL-MCNC: 313 MG/DL (ref 65–99)
HCT VFR BLD AUTO: 38.3 % (ref 34–46.6)
HGB BLD-MCNC: 12.9 G/DL (ref 12–15.9)
HOLD SPECIMEN: NORMAL
HOLD SPECIMEN: NORMAL
IMM GRANULOCYTES # BLD AUTO: 0.03 10*3/MM3 (ref 0–0.05)
IMM GRANULOCYTES NFR BLD AUTO: 0.6 % (ref 0–0.5)
LIPASE SERPL-CCNC: 30 U/L (ref 13–60)
LYMPHOCYTES # BLD AUTO: 1.63 10*3/MM3 (ref 0.7–3.1)
LYMPHOCYTES NFR BLD AUTO: 30.3 % (ref 19.6–45.3)
MAGNESIUM SERPL-MCNC: 1.6 MG/DL (ref 1.6–2.6)
MCH RBC QN AUTO: 29.9 PG (ref 26.6–33)
MCHC RBC AUTO-ENTMCNC: 33.7 G/DL (ref 31.5–35.7)
MCV RBC AUTO: 88.7 FL (ref 79–97)
MONOCYTES # BLD AUTO: 0.44 10*3/MM3 (ref 0.1–0.9)
MONOCYTES NFR BLD AUTO: 8.2 % (ref 5–12)
NEUTROPHILS NFR BLD AUTO: 3.09 10*3/MM3 (ref 1.7–7)
NEUTROPHILS NFR BLD AUTO: 57.4 % (ref 42.7–76)
NRBC BLD AUTO-RTO: 0 /100 WBC (ref 0–0.2)
PLATELET # BLD AUTO: 220 10*3/MM3 (ref 140–450)
PMV BLD AUTO: 9.8 FL (ref 6–12)
POTASSIUM SERPL-SCNC: 4.7 MMOL/L (ref 3.5–5.2)
PROT SERPL-MCNC: 8.2 G/DL (ref 6–8.5)
RBC # BLD AUTO: 4.32 10*6/MM3 (ref 3.77–5.28)
SODIUM SERPL-SCNC: 134 MMOL/L (ref 136–145)
WBC NRBC COR # BLD AUTO: 5.38 10*3/MM3 (ref 3.4–10.8)
WHOLE BLOOD HOLD COAG: NORMAL
WHOLE BLOOD HOLD SPECIMEN: NORMAL

## 2024-05-13 PROCEDURE — 74177 CT ABD & PELVIS W/CONTRAST: CPT

## 2024-05-13 PROCEDURE — 25510000001 IOPAMIDOL 61 % SOLUTION: Performed by: STUDENT IN AN ORGANIZED HEALTH CARE EDUCATION/TRAINING PROGRAM

## 2024-05-13 PROCEDURE — 83690 ASSAY OF LIPASE: CPT | Performed by: STUDENT IN AN ORGANIZED HEALTH CARE EDUCATION/TRAINING PROGRAM

## 2024-05-13 PROCEDURE — 74177 CT ABD & PELVIS W/CONTRAST: CPT | Performed by: RADIOLOGY

## 2024-05-13 PROCEDURE — 99285 EMERGENCY DEPT VISIT HI MDM: CPT

## 2024-05-13 PROCEDURE — 80053 COMPREHEN METABOLIC PANEL: CPT | Performed by: STUDENT IN AN ORGANIZED HEALTH CARE EDUCATION/TRAINING PROGRAM

## 2024-05-13 PROCEDURE — 85025 COMPLETE CBC W/AUTO DIFF WBC: CPT | Performed by: STUDENT IN AN ORGANIZED HEALTH CARE EDUCATION/TRAINING PROGRAM

## 2024-05-13 PROCEDURE — 25810000003 SODIUM CHLORIDE 0.9 % SOLUTION: Performed by: STUDENT IN AN ORGANIZED HEALTH CARE EDUCATION/TRAINING PROGRAM

## 2024-05-13 PROCEDURE — 96374 THER/PROPH/DIAG INJ IV PUSH: CPT

## 2024-05-13 PROCEDURE — 83735 ASSAY OF MAGNESIUM: CPT | Performed by: STUDENT IN AN ORGANIZED HEALTH CARE EDUCATION/TRAINING PROGRAM

## 2024-05-13 PROCEDURE — 25010000002 METOCLOPRAMIDE PER 10 MG: Performed by: STUDENT IN AN ORGANIZED HEALTH CARE EDUCATION/TRAINING PROGRAM

## 2024-05-13 RX ORDER — METOCLOPRAMIDE HYDROCHLORIDE 5 MG/ML
10 INJECTION INTRAMUSCULAR; INTRAVENOUS ONCE
Status: COMPLETED | OUTPATIENT
Start: 2024-05-13 | End: 2024-05-13

## 2024-05-13 RX ADMIN — IOPAMIDOL 87 ML: 612 INJECTION, SOLUTION INTRAVENOUS at 20:20

## 2024-05-13 RX ADMIN — SODIUM CHLORIDE 1000 ML: 9 INJECTION, SOLUTION INTRAVENOUS at 20:12

## 2024-05-13 RX ADMIN — METOCLOPRAMIDE 10 MG: 5 INJECTION, SOLUTION INTRAMUSCULAR; INTRAVENOUS at 20:12

## 2024-05-14 NOTE — ED PROVIDER NOTES
Subjective   History of Present Illness  39-year-old female past medical history of morbid obesity, anxiety, depression, diabetes, DVT, hypothyroidism, kidney stone, ovarian cancer, TIA presents to the ED with left-sided flank pain going around to her left lower abdomen.  Patient states she has been having nausea and vomiting as well.  Also has had small amount of bright red blood per rectum.  No diarrhea or constipation.  No fevers or chills.  No chest pain or shortness of breath.  Patient states that she does have kidney stones but does not have a kidney on the left side.  No vaginal bleeding or discharge.  No dysuria.  Patient has long history of multiple ER visits to multiple hospitals for abdominal pain.    History provided by:  Patient      Review of Systems    Past Medical History:   Diagnosis Date    A-fib     Abnormal ECG     Anemia     Anxiety     Asthma     Cancer     Ovarian    Depression     Diabetes mellitus     DVT (deep venous thrombosis)     Factor 5 Leiden mutation, heterozygous     Fibroid     GERD (gastroesophageal reflux disease)     Gout     H/O abdominal abscess     History of sepsis     History of transfusion     Hyperlipidemia     Hypothyroid     Kidney stone     Migraines     Neuropathy     Ovarian cancer 2021    Ovarian cyst     PE (pulmonary embolism)     Polycystic ovary syndrome     Preeclampsia     Rh incompatibility     Stroke     TIA (transient ischemic attack)     Urinary tract infection     Varicella        Allergies   Allergen Reactions    Shellfish Allergy Anaphylaxis    Toradol [Ketorolac Tromethamine] Anaphylaxis and Hives    Tree Nut Anaphylaxis and Shortness Of Breath     WALNUTS    Haldol [Haloperidol] Hives and Mental Status Change    Tramadol Hives and Swelling    Amoxicillin Hives and Rash    Penicillins Hives and Rash       Past Surgical History:   Procedure Laterality Date    ABDOMINAL SURGERY      CARDIAC CATHETERIZATION       SECTION      CHOLECYSTECTOMY       COLONOSCOPY      ENDOSCOPY      EXTRACORPOREAL SHOCK WAVE LITHOTRIPSY (ESWL) Left 10/22/2021    Procedure: EXTRACORPOREAL SHOCKWAVE LITHOTRIPSY;  Surgeon: Milan Motley MD;  Location: [facility];  Service: Urology;  Laterality: Left;    LITHOTRIPSY      NEPHRECTOMY Left 10/2022    RIGHT OOPHORECTOMY      URETEROSCOPY LASER LITHOTRIPSY WITH STENT INSERTION Left 10/01/2021    Procedure: URETEROSCOPY WITH STENT PLACEMENT;  Surgeon: Milan Motley MD;  Location: [facility];  Service: Urology;  Laterality: Left;    URETEROSCOPY LASER LITHOTRIPSY WITH STENT INSERTION Left 04/27/2022    Procedure: URETEROSCOPY STENT REMOVAL;  Surgeon: Milan Motley MD;  Location: [facility];  Service: Urology;  Laterality: Left;    URETEROSCOPY LASER LITHOTRIPSY WITH STENT INSERTION Left 06/29/2022    Procedure: CYSTOSCOPY RETROGRADE LEFT WITH STENT PLACEMENT;  Surgeon: Milan Motley MD;  Location: [facility];  Service: Urology;  Laterality: Left;       Family History   Problem Relation Age of Onset    Diabetes Father     Hypertension Father     Heart attack Father     Hypertension Mother     No Known Problems Paternal Grandmother     No Known Problems Paternal Grandfather     No Known Problems Maternal Grandmother     No Known Problems Maternal Grandfather     No Known Problems Sister     No Known Problems Brother     No Known Problems Daughter     No Known Problems Son     No Known Problems Cousin     Rheum arthritis Neg Hx     Osteoarthritis Neg Hx     Asthma Neg Hx     Heart failure Neg Hx     Hyperlipidemia Neg Hx     Migraines Neg Hx     Rashes / Skin problems Neg Hx     Seizures Neg Hx     Stroke Neg Hx     Thyroid disease Neg Hx        Social History     Socioeconomic History    Marital status:    Tobacco Use    Smoking status: Never     Passive exposure: Never    Smokeless tobacco: Never   Vaping Use    Vaping status: Never Used   Substance and Sexual Activity    Alcohol use: No     "Drug use: Never     Comment: Pt has track marks on right arm. Pt states \"It's from my INR being drawn.\"     Sexual activity: Not Currently     Partners: Male     Birth control/protection: None           Objective   Physical Exam  Constitutional:       General: She is not in acute distress.     Appearance: She is obese. She is not ill-appearing.   HENT:      Head: Normocephalic and atraumatic.   Eyes:      Conjunctiva/sclera: Conjunctivae normal.   Cardiovascular:      Rate and Rhythm: Normal rate and regular rhythm.   Pulmonary:      Effort: Pulmonary effort is normal.   Abdominal:      General: Abdomen is flat.      Palpations: Abdomen is soft.      Tenderness: There is abdominal tenderness.      Comments: Mild left lower abdominal tenderness   Neurological:      General: No focal deficit present.      Mental Status: She is alert and oriented to person, place, and time. Mental status is at baseline.         Procedures           ED Course                                             Medical Decision Making  39-year-old female presents to the ED for abdominal/flank pain and nausea vomiting.  Vitals stable on arrival.  Patient given fluid and Reglan for symptoms.  Labs, urine, CT scan obtained for further evaluation.  Labs showed no significant acute findings.  CT scan was obtained and well waiting on the radiology read, patient stated that she was ready to go and signed out AMA and left.  She remained well-appearing with normal vitals throughout time in the ED.  She is welcome to return for further management.    Problems Addressed:  Left sided abdominal pain: complicated acute illness or injury  Nausea and vomiting, unspecified vomiting type: complicated acute illness or injury    Amount and/or Complexity of Data Reviewed  Labs: ordered.  Radiology: ordered.    Risk  Prescription drug management.        Final diagnoses:   Left sided abdominal pain   Nausea and vomiting, unspecified vomiting type       ED " Disposition  ED Disposition       ED Disposition   AMA    Condition   --    Comment   --               Bladimir Calle, PAJENIFER  [address] [facility] [address]  [phone]    Schedule an appointment as soon as possible for a visit       [facility] EMERGENCY DEPARTMENT  [address] [place] [id number] [phone]    If symptoms worsen         Medication List        Changed      gabapentin 300 MG capsule  Commonly known as: NEURONTIN  TAKE ONE CAPSULE BY MOUTH ONCE NIGHTLY AS NEEDED FOR NEUROPATHY  What changed: See the new instructions.     metoprolol succinate XL 50 MG 24 hr tablet  Commonly known as: TOPROL-XL  Take 1 tablet by mouth Daily.  What changed: when to take this     rosuvastatin 20 MG tablet  Commonly known as: CRESTOR  Take 1 tablet by mouth Every Night.  What changed: how much to take                 Barry Cisneros MD  05/13/24 2122

## 2024-06-25 ENCOUNTER — TELEPHONE (OUTPATIENT)
Dept: DIABETES SERVICES | Facility: HOSPITAL | Age: 39
End: 2024-06-25
Payer: COMMERCIAL

## 2024-07-08 ENCOUNTER — EDUCATION (OUTPATIENT)
Dept: DIABETES SERVICES | Facility: HOSPITAL | Age: 39
End: 2024-07-08
Payer: COMMERCIAL

## 2024-07-08 ENCOUNTER — APPOINTMENT (OUTPATIENT)
Dept: CT IMAGING | Facility: HOSPITAL | Age: 39
End: 2024-07-08
Payer: COMMERCIAL

## 2024-07-08 ENCOUNTER — HOSPITAL ENCOUNTER (EMERGENCY)
Facility: HOSPITAL | Age: 39
Discharge: HOME | End: 2024-07-08
Attending: STUDENT IN AN ORGANIZED HEALTH CARE EDUCATION/TRAINING PROGRAM | Admitting: STUDENT IN AN ORGANIZED HEALTH CARE EDUCATION/TRAINING PROGRAM
Payer: COMMERCIAL

## 2024-07-08 VITALS
HEIGHT: 64 IN | SYSTOLIC BLOOD PRESSURE: 130 MMHG | BODY MASS INDEX: 50.02 KG/M2 | HEART RATE: 108 BPM | WEIGHT: 293 LBS | DIASTOLIC BLOOD PRESSURE: 86 MMHG | OXYGEN SATURATION: 90 % | RESPIRATION RATE: 20 BRPM | TEMPERATURE: 97.5 F

## 2024-07-08 DIAGNOSIS — R10.9 FLANK PAIN: Primary | ICD-10-CM

## 2024-07-08 DIAGNOSIS — R73.9 HYPERGLYCEMIA: ICD-10-CM

## 2024-07-08 DIAGNOSIS — E86.0 DEHYDRATION: ICD-10-CM

## 2024-07-08 LAB
ACETONE BLD QL: NEGATIVE
ALBUMIN SERPL-MCNC: 4.2 G/DL (ref 3.5–5.2)
ALBUMIN/GLOB SERPL: 1.3 G/DL
ALP SERPL-CCNC: 105 U/L (ref 39–117)
ALT SERPL W P-5'-P-CCNC: 31 U/L (ref 1–33)
ANION GAP SERPL CALCULATED.3IONS-SCNC: 17.7 MMOL/L (ref 5–15)
AST SERPL-CCNC: 36 U/L (ref 1–32)
BACTERIA UR QL AUTO: NORMAL /HPF
BASOPHILS # BLD AUTO: 0.04 10*3/MM3 (ref 0–0.2)
BASOPHILS NFR BLD AUTO: 0.8 % (ref 0–1.5)
BILIRUB SERPL-MCNC: 0.3 MG/DL (ref 0–1.2)
BILIRUB UR QL STRIP: NEGATIVE
BUN SERPL-MCNC: 17 MG/DL (ref 6–20)
BUN/CREAT SERPL: 23.6 (ref 7–25)
CALCIUM SPEC-SCNC: 9.8 MG/DL (ref 8.6–10.5)
CHLORIDE SERPL-SCNC: 94 MMOL/L (ref 98–107)
CLARITY UR: CLEAR
CO2 SERPL-SCNC: 19.3 MMOL/L (ref 22–29)
COLOR UR: YELLOW
CREAT SERPL-MCNC: 0.72 MG/DL (ref 0.57–1)
CRP SERPL-MCNC: 1.42 MG/DL (ref 0–0.5)
DEPRECATED RDW RBC AUTO: 48.9 FL (ref 37–54)
EGFRCR SERPLBLD CKD-EPI 2021: 109.9 ML/MIN/1.73
EOSINOPHIL # BLD AUTO: 0.11 10*3/MM3 (ref 0–0.4)
EOSINOPHIL NFR BLD AUTO: 2.2 % (ref 0.3–6.2)
ERYTHROCYTE [DISTWIDTH] IN BLOOD BY AUTOMATED COUNT: 15.1 % (ref 12.3–15.4)
GLOBULIN UR ELPH-MCNC: 3.3 GM/DL
GLUCOSE BLDC GLUCOMTR-MCNC: 409 MG/DL (ref 70–130)
GLUCOSE SERPL-MCNC: 433 MG/DL (ref 65–99)
GLUCOSE UR STRIP-MCNC: ABNORMAL MG/DL
HCT VFR BLD AUTO: 34.8 % (ref 34–46.6)
HGB BLD-MCNC: 11.6 G/DL (ref 12–15.9)
HGB UR QL STRIP.AUTO: NEGATIVE
HOLD SPECIMEN: NORMAL
HOLD SPECIMEN: NORMAL
HYALINE CASTS UR QL AUTO: NORMAL /LPF
IMM GRANULOCYTES # BLD AUTO: 0.01 10*3/MM3 (ref 0–0.05)
IMM GRANULOCYTES NFR BLD AUTO: 0.2 % (ref 0–0.5)
KETONES UR QL STRIP: ABNORMAL
LEUKOCYTE ESTERASE UR QL STRIP.AUTO: NEGATIVE
LIPASE SERPL-CCNC: 39 U/L (ref 13–60)
LYMPHOCYTES # BLD AUTO: 1.54 10*3/MM3 (ref 0.7–3.1)
LYMPHOCYTES NFR BLD AUTO: 30.2 % (ref 19.6–45.3)
MCH RBC QN AUTO: 29.9 PG (ref 26.6–33)
MCHC RBC AUTO-ENTMCNC: 33.3 G/DL (ref 31.5–35.7)
MCV RBC AUTO: 89.7 FL (ref 79–97)
MONOCYTES # BLD AUTO: 0.48 10*3/MM3 (ref 0.1–0.9)
MONOCYTES NFR BLD AUTO: 9.4 % (ref 5–12)
NEUTROPHILS NFR BLD AUTO: 2.92 10*3/MM3 (ref 1.7–7)
NEUTROPHILS NFR BLD AUTO: 57.2 % (ref 42.7–76)
NITRITE UR QL STRIP: NEGATIVE
NRBC BLD AUTO-RTO: 0 /100 WBC (ref 0–0.2)
PH UR STRIP.AUTO: 5.5 [PH] (ref 5–8)
PLATELET # BLD AUTO: 171 10*3/MM3 (ref 140–450)
PMV BLD AUTO: 9.2 FL (ref 6–12)
POTASSIUM SERPL-SCNC: 4.4 MMOL/L (ref 3.5–5.2)
PROT SERPL-MCNC: 7.5 G/DL (ref 6–8.5)
PROT UR QL STRIP: ABNORMAL
RBC # BLD AUTO: 3.88 10*6/MM3 (ref 3.77–5.28)
RBC # UR STRIP: NORMAL /HPF
REF LAB TEST METHOD: NORMAL
SODIUM SERPL-SCNC: 131 MMOL/L (ref 136–145)
SP GR UR STRIP: >1.03 (ref 1–1.03)
SQUAMOUS #/AREA URNS HPF: NORMAL /HPF
UROBILINOGEN UR QL STRIP: ABNORMAL
WBC # UR STRIP: NORMAL /HPF
WBC NRBC COR # BLD AUTO: 5.1 10*3/MM3 (ref 3.4–10.8)
WHOLE BLOOD HOLD COAG: NORMAL
WHOLE BLOOD HOLD SPECIMEN: NORMAL

## 2024-07-08 PROCEDURE — 85025 COMPLETE CBC W/AUTO DIFF WBC: CPT | Performed by: PHYSICIAN ASSISTANT

## 2024-07-08 PROCEDURE — 86140 C-REACTIVE PROTEIN: CPT | Performed by: PHYSICIAN ASSISTANT

## 2024-07-08 PROCEDURE — 25010000002 MORPHINE PER 10 MG: Performed by: STUDENT IN AN ORGANIZED HEALTH CARE EDUCATION/TRAINING PROGRAM

## 2024-07-08 PROCEDURE — 82009 KETONE BODYS QUAL: CPT | Performed by: PHYSICIAN ASSISTANT

## 2024-07-08 PROCEDURE — 83690 ASSAY OF LIPASE: CPT | Performed by: PHYSICIAN ASSISTANT

## 2024-07-08 PROCEDURE — 36415 COLL VENOUS BLD VENIPUNCTURE: CPT

## 2024-07-08 PROCEDURE — 74176 CT ABD & PELVIS W/O CONTRAST: CPT

## 2024-07-08 PROCEDURE — 96374 THER/PROPH/DIAG INJ IV PUSH: CPT

## 2024-07-08 PROCEDURE — 81001 URINALYSIS AUTO W/SCOPE: CPT | Performed by: PHYSICIAN ASSISTANT

## 2024-07-08 PROCEDURE — 74176 CT ABD & PELVIS W/O CONTRAST: CPT | Performed by: RADIOLOGY

## 2024-07-08 PROCEDURE — 63710000001 INSULIN REGULAR HUMAN PER 5 UNITS: Performed by: PHYSICIAN ASSISTANT

## 2024-07-08 PROCEDURE — 80053 COMPREHEN METABOLIC PANEL: CPT | Performed by: PHYSICIAN ASSISTANT

## 2024-07-08 PROCEDURE — 82948 REAGENT STRIP/BLOOD GLUCOSE: CPT

## 2024-07-08 PROCEDURE — 99284 EMERGENCY DEPT VISIT MOD MDM: CPT

## 2024-07-08 RX ORDER — SODIUM CHLORIDE 0.9 % (FLUSH) 0.9 %
10 SYRINGE (ML) INJECTION AS NEEDED
Status: DISCONTINUED | OUTPATIENT
Start: 2024-07-08 | End: 2024-07-08 | Stop reason: HOSPADM

## 2024-07-08 RX ORDER — MORPHINE SULFATE 2 MG/ML
2 INJECTION, SOLUTION INTRAMUSCULAR; INTRAVENOUS ONCE
Status: COMPLETED | OUTPATIENT
Start: 2024-07-08 | End: 2024-07-08

## 2024-07-08 RX ADMIN — INSULIN HUMAN 6 UNITS: 100 INJECTION, SOLUTION PARENTERAL at 12:39

## 2024-07-08 RX ADMIN — SODIUM CHLORIDE 1000 ML: 9 INJECTION, SOLUTION INTRAVENOUS at 12:45

## 2024-07-08 RX ADMIN — MORPHINE SULFATE 2 MG: 2 INJECTION, SOLUTION INTRAMUSCULAR; INTRAVENOUS at 12:38

## 2024-07-08 NOTE — CONSULTS
Diabetes Education  Assessment/Teaching    Patient Name:  Natalia Arzate  YOB: 1985  MRN: [id number]  Admit Date:  (Not on file)      Assessment Date:  7/8/2024      Flowsheet Row Most Recent Value   DM Education Needs    Meter Has own   Meter Type Other (comment)  [Dexcom 6]   Frequency of Testing Other (comment)  [CGM]   Medication Insulin   Problem Solving Hypoglycemia, Sick days, Hyperglycemia, Signs, Symptoms, Treatment   Reducing Risks Retinopathy, Neuropathy, Cardiovascular, Immunizations, Foot care, Dental exam, Eye exam, Blood pressure, Lipids, A1C testing   Healthy Eating Basic meal plan provided   Physical Activity Walking   Physical Activity Frequency Occasionally   Healthy Coping Appropriate   Discharge Plan Follow-up with endocrinolgoist, Follow-up with PCP   Motivation Moderate, Engaged   Teaching Method Explanation, Discussion, Handouts   Patient Response Verbalized understanding              Other Comments:  A1C 9.9 Patient is a 39 year old female with a history of A-fib, anxiety, asthma, cancer, depression, diabetes, fibroid, GERD, gout, HLD, HTN, migraines, neuropathy, PE, and stroke.   Healthy eating:  Patient states she will decrease carb intake and will try to eat healthier. Patient states that she usually does smaller meals and watches portion control since being on the Mounjaro shot.   Being active:   Patient states she will walk when she is able but she states that she does chair exercise due to her limited ability to walk from the stroke.   Monitoring:  Patient states that she is taking her blood glucose. Patient she has a Dexcom 6 CGM and has been able to check blood glucose when needed to see if high or low.   Medication:  Patient will take medications as prescribed. Patient was able to list her medications and the dose as prescribed.  Lantus 75 units daily  Humalog SS 15 units TID with meals  Mounjaro 10 mg weekly.  Problem solving:  Patient states the signs and symptoms  of hypo/hyperglycemia and the treatment for both.   Reducing risk:  Patient states she checks her feet nightly and does not walk without something to protect her feet. Patient states she goes to her follow up appointments and goes to the eye doctor and dentist when needed and on annual visits.   Healthy coping:  Patient has a good support team at home and is able to talk to them for help when needed.   Patient received a call and was educated on diet, activity, checking blood glucose, taking medication as prescribed, checking feet daily and S/S of hypo/hyperglycemia. Patient was educated on sick rule days. A packet of information will be mailed, that includes ADA handouts, AADE 7 handout, fast food book and grocery shopping book for help when shopping along with office contact number for questions. Patient had no questions or concerns. Thank you.      Electronically signed by:  Rosa Isela Sullivan RN  07/08/24 15:26 EDT

## 2024-07-08 NOTE — ED PROVIDER NOTES
Subjective   History of Present Illness  39-year-old female patient with a past medical history of diabetes, GERD, hyperlipidemia, hypothyroidism, migraines presents to the emergency room today with complaints of right flank pain radiating to her right upper quadrant.  She does report some dysuria but no hematuria.  Denies any nausea vomiting or diarrhea today.  Denies any fevers.  Denies any chest pain or shortness of breath.  She denies any worsening or alleviating factors.        Review of Systems   Constitutional: Negative.    HENT: Negative.     Eyes: Negative.    Respiratory: Negative.     Cardiovascular: Negative.    Gastrointestinal:  Positive for abdominal pain.   Endocrine: Negative.    Genitourinary:  Positive for flank pain.   Skin: Negative.    Allergic/Immunologic: Negative.    Neurological: Negative.    Hematological: Negative.    Psychiatric/Behavioral: Negative.     All other systems reviewed and are negative.      Past Medical History:   Diagnosis Date    A-fib     Abnormal ECG     Anemia     Anxiety     Asthma     Cancer     Ovarian    Depression     Diabetes mellitus     DVT (deep venous thrombosis)     Factor 5 Leiden mutation, heterozygous     Fibroid     GERD (gastroesophageal reflux disease)     Gout     H/O abdominal abscess     History of sepsis     History of transfusion     Hyperlipidemia     Hypothyroid     Kidney stone     Migraines     Neuropathy     Ovarian cancer 02/2021    Ovarian cyst     PE (pulmonary embolism)     Polycystic ovary syndrome     Preeclampsia     Rh incompatibility     Stroke     TIA (transient ischemic attack)     Urinary tract infection     Varicella        Allergies   Allergen Reactions    Shellfish Allergy Anaphylaxis    Toradol [Ketorolac Tromethamine] Anaphylaxis and Hives    Tree Nut Anaphylaxis and Shortness Of Breath     WALNUTS    Haldol [Haloperidol] Hives and Mental Status Change    Tramadol Hives and Swelling    Amoxicillin Hives and Rash    Penicillins  Hives and Rash       Past Surgical History:   Procedure Laterality Date    ABDOMINAL SURGERY      CARDIAC CATHETERIZATION       SECTION      CHOLECYSTECTOMY      COLONOSCOPY      ENDOSCOPY      EXTRACORPOREAL SHOCK WAVE LITHOTRIPSY (ESWL) Left 10/22/2021    Procedure: EXTRACORPOREAL SHOCKWAVE LITHOTRIPSY;  Surgeon: Milan Motley MD;  Location: [facility];  Service: Urology;  Laterality: Left;    LITHOTRIPSY      NEPHRECTOMY Left 10/2022    RIGHT OOPHORECTOMY      URETEROSCOPY LASER LITHOTRIPSY WITH STENT INSERTION Left 10/01/2021    Procedure: URETEROSCOPY WITH STENT PLACEMENT;  Surgeon: Milan Motley MD;  Location: [facility];  Service: Urology;  Laterality: Left;    URETEROSCOPY LASER LITHOTRIPSY WITH STENT INSERTION Left 2022    Procedure: URETEROSCOPY STENT REMOVAL;  Surgeon: Milan Motley MD;  Location: [facility];  Service: Urology;  Laterality: Left;    URETEROSCOPY LASER LITHOTRIPSY WITH STENT INSERTION Left 2022    Procedure: CYSTOSCOPY RETROGRADE LEFT WITH STENT PLACEMENT;  Surgeon: Milan Motley MD;  Location: [facility];  Service: Urology;  Laterality: Left;       Family History   Problem Relation Age of Onset    Diabetes Father     Hypertension Father     Heart attack Father     Hypertension Mother     No Known Problems Paternal Grandmother     No Known Problems Paternal Grandfather     No Known Problems Maternal Grandmother     No Known Problems Maternal Grandfather     No Known Problems Sister     No Known Problems Brother     No Known Problems Daughter     No Known Problems Son     No Known Problems Cousin     Rheum arthritis Neg Hx     Osteoarthritis Neg Hx     Asthma Neg Hx     Heart failure Neg Hx     Hyperlipidemia Neg Hx     Migraines Neg Hx     Rashes / Skin problems Neg Hx     Seizures Neg Hx     Stroke Neg Hx     Thyroid disease Neg Hx        Social History     Socioeconomic History    Marital status:    Tobacco Use    Smoking  "status: Never     Passive exposure: Never    Smokeless tobacco: Never   Vaping Use    Vaping status: Never Used   Substance and Sexual Activity    Alcohol use: No    Drug use: Never     Comment: Pt has track marks on right arm. Pt states \"It's from my INR being drawn.\"     Sexual activity: Not Currently     Partners: Male     Birth control/protection: None           Objective   Physical Exam  Vitals and nursing note reviewed.   Constitutional:       General: She is not in acute distress.     Appearance: She is well-developed. She is obese. She is not ill-appearing, toxic-appearing or diaphoretic.   HENT:      Head: Normocephalic and atraumatic.      Mouth/Throat:      Mouth: Mucous membranes are moist.      Pharynx: Oropharynx is clear.   Eyes:      Extraocular Movements: Extraocular movements intact.      Pupils: Pupils are equal, round, and reactive to light.   Cardiovascular:      Rate and Rhythm: Normal rate and regular rhythm.      Heart sounds: Normal heart sounds.   Pulmonary:      Effort: Pulmonary effort is normal.      Breath sounds: Normal breath sounds.   Abdominal:      General: Abdomen is flat. Bowel sounds are normal.      Palpations: Abdomen is soft.      Tenderness: There is no abdominal tenderness.   Skin:     General: Skin is warm and dry.      Capillary Refill: Capillary refill takes less than 2 seconds.   Neurological:      General: No focal deficit present.      Mental Status: She is alert and oriented to person, place, and time.   Psychiatric:         Mood and Affect: Mood normal.         Behavior: Behavior normal.         Procedures           ED Course  ED Course as of 07/08/24 1341   Mon Jul 08, 2024   1329 CT Abdomen Pelvis Stone Protocol  IMPRESSION:  1.  Marked fatty infiltration of liver with hepatomegaly.  2.  Cholecystectomy.  3.  Splenomegaly, 20.4 cm length of spleen.  4.  Left nephrectomy.  5.  Phleboliths lower pelvis but no ureteral stones identified. No  obstructive uropathy.  6. "  Moderate degree of constipation. No bowel obstruction.  7. Other nonacute findings above.        This report was finalized on 7/8/2024 1:20 PM by Dr. Spencer Hightower MD.           Specimen Collected: 07/08/24 13:17 EDT Last Resulted: 07/08/24 13:20 EDT         [ML]   1329 PT has a chronically enlarged spleen.  [ML]      ED Course User Index  [ML] Georgie Mckenzie PA                                 Results for orders placed or performed during the hospital encounter of 07/08/24   Comprehensive Metabolic Panel    Specimen: Arm, Left; Blood   Result Value Ref Range    Glucose 433 (C) 65 - 99 mg/dL    BUN 17 6 - 20 mg/dL    Creatinine 0.72 0.57 - 1.00 mg/dL    Sodium 131 (L) 136 - 145 mmol/L    Potassium 4.4 3.5 - 5.2 mmol/L    Chloride 94 (L) 98 - 107 mmol/L    CO2 19.3 (L) 22.0 - 29.0 mmol/L    Calcium 9.8 8.6 - 10.5 mg/dL    Total Protein 7.5 6.0 - 8.5 g/dL    Albumin 4.2 3.5 - 5.2 g/dL    ALT (SGPT) 31 1 - 33 U/L    AST (SGOT) 36 (H) 1 - 32 U/L    Alkaline Phosphatase 105 39 - 117 U/L    Total Bilirubin 0.3 0.0 - 1.2 mg/dL    Globulin 3.3 gm/dL    A/G Ratio 1.3 g/dL    BUN/Creatinine Ratio 23.6 7.0 - 25.0    Anion Gap 17.7 (H) 5.0 - 15.0 mmol/L    eGFR 109.9 >60.0 mL/min/1.73   Urinalysis With Microscopic If Indicated (No Culture) - Urine, Clean Catch    Specimen: Urine, Clean Catch   Result Value Ref Range    Color, UA Yellow Yellow, Straw    Appearance, UA Clear Clear    pH, UA 5.5 5.0 - 8.0    Specific Gravity, UA >1.030 (H) 1.005 - 1.030    Glucose, UA >=1000 mg/dL (3+) (A) Negative    Ketones, UA 15 mg/dL (1+) (A) Negative    Bilirubin, UA Negative Negative    Blood, UA Negative Negative    Protein, UA 30 mg/dL (1+) (A) Negative    Leuk Esterase, UA Negative Negative    Nitrite, UA Negative Negative    Urobilinogen, UA 0.2 E.U./dL 0.2 - 1.0 E.U./dL   Lipase    Specimen: Arm, Left; Blood   Result Value Ref Range    Lipase 39 13 - 60 U/L   C-reactive Protein    Specimen: Arm, Left; Blood   Result Value Ref  Range    C-Reactive Protein 1.42 (H) 0.00 - 0.50 mg/dL   CBC Auto Differential    Specimen: Arm, Left; Blood   Result Value Ref Range    WBC 5.10 3.40 - 10.80 10*3/mm3    RBC 3.88 3.77 - 5.28 10*6/mm3    Hemoglobin 11.6 (L) 12.0 - 15.9 g/dL    Hematocrit 34.8 34.0 - 46.6 %    MCV 89.7 79.0 - 97.0 fL    MCH 29.9 26.6 - 33.0 pg    MCHC 33.3 31.5 - 35.7 g/dL    RDW 15.1 12.3 - 15.4 %    RDW-SD 48.9 37.0 - 54.0 fl    MPV 9.2 6.0 - 12.0 fL    Platelets 171 140 - 450 10*3/mm3    Neutrophil % 57.2 42.7 - 76.0 %    Lymphocyte % 30.2 19.6 - 45.3 %    Monocyte % 9.4 5.0 - 12.0 %    Eosinophil % 2.2 0.3 - 6.2 %    Basophil % 0.8 0.0 - 1.5 %    Immature Grans % 0.2 0.0 - 0.5 %    Neutrophils, Absolute 2.92 1.70 - 7.00 10*3/mm3    Lymphocytes, Absolute 1.54 0.70 - 3.10 10*3/mm3    Monocytes, Absolute 0.48 0.10 - 0.90 10*3/mm3    Eosinophils, Absolute 0.11 0.00 - 0.40 10*3/mm3    Basophils, Absolute 0.04 0.00 - 0.20 10*3/mm3    Immature Grans, Absolute 0.01 0.00 - 0.05 10*3/mm3    nRBC 0.0 0.0 - 0.2 /100 WBC   Acetone    Specimen: Arm, Left; Blood   Result Value Ref Range    Acetone Negative Negative   Urinalysis, Microscopic Only - Urine, Clean Catch    Specimen: Urine, Clean Catch   Result Value Ref Range    RBC, UA None Seen None Seen, 0-2 /HPF    WBC, UA 0-2 None Seen, 0-2 /HPF    Bacteria, UA None Seen None Seen /HPF    Squamous Epithelial Cells, UA 0-2 None Seen, 0-2 /HPF    Hyaline Casts, UA None Seen None Seen /LPF    Methodology Automated Microscopy    Green Top (Gel)   Result Value Ref Range    Extra Tube Hold for add-ons.    Lavender Top   Result Value Ref Range    Extra Tube hold for add-on    Gold Top - SST   Result Value Ref Range    Extra Tube Hold for add-ons.    Light Blue Top   Result Value Ref Range    Extra Tube Hold for add-ons.      CT Abdomen Pelvis Stone Protocol    Result Date: 7/8/2024  1.  Marked fatty infiltration of liver with hepatomegaly. 2.  Cholecystectomy. 3.  Splenomegaly, 20.4 cm length of  spleen. 4.  Left nephrectomy. 5.  Phleboliths lower pelvis but no ureteral stones identified. No obstructive uropathy. 6.  Moderate degree of constipation. No bowel obstruction. 7. Other nonacute findings above.   This report was finalized on 7/8/2024 1:20 PM by Dr. Spencer Hightower MD.                 Medical Decision Making  39-year-old female patient with a past medical history of diabetes, GERD, hyperlipidemia, hypothyroidism, migraines presents to the emergency room today with complaints of right flank pain radiating to her right upper quadrant.  She does report some dysuria but no hematuria.  Denies any nausea vomiting or diarrhea today.  Denies any fevers.  Denies any chest pain or shortness of breath.  She denies any worsening or alleviating factors. ED stay uncomplicated. PT is feeling better. PT will f/u with PCP tomorrow. Discussed sx and red flags that would warrant return to the ED.  Education provided for hyperglycemia.      Amount and/or Complexity of Data Reviewed  Labs: ordered.  Radiology: ordered. Decision-making details documented in ED Course.    Risk  OTC drugs.  Prescription drug management.        Final diagnoses:   Flank pain   Dehydration   Hyperglycemia       ED Disposition  ED Disposition       ED Disposition   Discharge    Condition   Stable    Comment   --               Bladimir Calle, PAJerilynC  [address] [address] [phone]    Schedule an appointment as soon as possible for a visit in 1 day           Medication List        Changed      gabapentin 300 MG capsule  Commonly known as: NEURONTIN  TAKE ONE CAPSULE BY MOUTH ONCE NIGHTLY AS NEEDED FOR NEUROPATHY  What changed: See the new instructions.     metoprolol succinate XL 50 MG 24 hr tablet  Commonly known as: TOPROL-XL  Take 1 tablet by mouth Daily.  What changed: when to take this     rosuvastatin 20 MG tablet  Commonly known as: CRESTOR  Take 1 tablet by mouth Every Night.  What changed: how much to  Georgie Carty PA  07/08/24 [id number]

## 2024-07-25 RX ORDER — TIRZEPATIDE 10 MG/.5ML
10 INJECTION, SOLUTION SUBCUTANEOUS WEEKLY
Qty: 2 ML | Refills: 2 | Status: SHIPPED | OUTPATIENT
Start: 2024-07-25

## 2024-07-25 NOTE — TELEPHONE ENCOUNTER
PATIENT STATES HER MOUNJARO 10MG IS CURRENTLY ON BACK ORDER WITH [facility]. PATIENT IS REQUESTING A CALL BACK TO DISCUSS HER CONCERN AND THE NEXT STEP.     PLEASE CALL BACK [phone]

## 2024-07-31 ENCOUNTER — OFFICE VISIT (OUTPATIENT)
Dept: UROLOGY | Facility: CLINIC | Age: 39
End: 2024-07-31
Payer: COMMERCIAL

## 2024-07-31 VITALS
WEIGHT: 293 LBS | HEART RATE: 102 BPM | DIASTOLIC BLOOD PRESSURE: 98 MMHG | BODY MASS INDEX: 50.02 KG/M2 | HEIGHT: 64 IN | SYSTOLIC BLOOD PRESSURE: 161 MMHG

## 2024-07-31 DIAGNOSIS — N32.81 OAB (OVERACTIVE BLADDER): ICD-10-CM

## 2024-07-31 DIAGNOSIS — G89.29 RIGHT FLANK PAIN, CHRONIC: Primary | ICD-10-CM

## 2024-07-31 DIAGNOSIS — R35.0 FREQUENCY OF MICTURITION: ICD-10-CM

## 2024-07-31 DIAGNOSIS — R10.9 RIGHT FLANK PAIN, CHRONIC: Primary | ICD-10-CM

## 2024-07-31 DIAGNOSIS — N32.81 DETRUSOR INSTABILITY OF BLADDER: ICD-10-CM

## 2024-07-31 DIAGNOSIS — N30.00 ACUTE CYSTITIS WITHOUT HEMATURIA: ICD-10-CM

## 2024-07-31 DIAGNOSIS — K59.04 CHRONIC IDIOPATHIC CONSTIPATION: ICD-10-CM

## 2024-07-31 LAB
BILIRUB BLD-MCNC: NEGATIVE MG/DL
CLARITY, POC: CLEAR
COLOR UR: YELLOW
EXPIRATION DATE: ABNORMAL
GLUCOSE UR STRIP-MCNC: ABNORMAL MG/DL
KETONES UR QL: NEGATIVE
LEUKOCYTE EST, POC: NEGATIVE
Lab: ABNORMAL
NITRITE UR-MCNC: NEGATIVE MG/ML
PH UR: 5 [PH] (ref 5–8)
PROT UR STRIP-MCNC: ABNORMAL MG/DL
RBC # UR STRIP: NEGATIVE /UL
SP GR UR: 1.01 (ref 1–1.03)
UROBILINOGEN UR QL: NORMAL

## 2024-07-31 PROCEDURE — 1159F MED LIST DOCD IN RCRD: CPT | Performed by: NURSE PRACTITIONER

## 2024-07-31 PROCEDURE — 99214 OFFICE O/P EST MOD 30 MIN: CPT | Performed by: NURSE PRACTITIONER

## 2024-07-31 PROCEDURE — 3080F DIAST BP >= 90 MM HG: CPT | Performed by: NURSE PRACTITIONER

## 2024-07-31 PROCEDURE — 1160F RVW MEDS BY RX/DR IN RCRD: CPT | Performed by: NURSE PRACTITIONER

## 2024-07-31 PROCEDURE — 3077F SYST BP >= 140 MM HG: CPT | Performed by: NURSE PRACTITIONER

## 2024-07-31 PROCEDURE — 87086 URINE CULTURE/COLONY COUNT: CPT | Performed by: NURSE PRACTITIONER

## 2024-07-31 RX ORDER — PROMETHAZINE HYDROCHLORIDE 25 MG/1
25 TABLET ORAL EVERY 12 HOURS PRN
Qty: 20 TABLET | Refills: 0 | Status: SHIPPED | OUTPATIENT
Start: 2024-07-31

## 2024-07-31 RX ORDER — OXYCODONE HYDROCHLORIDE AND ACETAMINOPHEN 5; 325 MG/1; MG/1
1-2 TABLET ORAL EVERY 12 HOURS PRN
Qty: 10 TABLET | Refills: 0 | Status: SHIPPED | OUTPATIENT
Start: 2024-07-31 | End: 2024-08-05

## 2024-07-31 RX ORDER — AMOXICILLIN 250 MG
2 CAPSULE ORAL DAILY
Qty: 60 TABLET | Refills: 3 | Status: SHIPPED | OUTPATIENT
Start: 2024-07-31 | End: 2024-08-30

## 2024-07-31 RX ORDER — TAMSULOSIN HYDROCHLORIDE 0.4 MG/1
1 CAPSULE ORAL DAILY
Qty: 30 CAPSULE | Refills: 5 | Status: SHIPPED | OUTPATIENT
Start: 2024-07-31

## 2024-07-31 NOTE — PROGRESS NOTES
Chief Complaint  OAB/DETRUSOR INSTABILITY WITH URINE INCONTINENCE (YEARLY FOLLOW UP REPORTS RIGHT FLANK PAIN)    Subjective          Natalia Arzate presents to [facility] for OAB/DI/AINSLEY/FLANK PAIN  History of Present Illness   History of Present Illness  The patient is a pleasant 39-year-old female with significant debility complicated by complex medical history who returns to clinic today for evaluation. This is a yearly follow-up with prior concerns of CHRONIC RIGHT flank pain, and overactive bladder/detrusor instability complicated by bouts of urine frequency, urgency, and incontinence without sensory awareness. She presents with an extensive urologic history of multiple ureteroscopy's, ESWL, and stent placement over the past YEARS.  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  She uses a walker for mobility.    Recently evaluated at [facility] ED, the patient averages 4-5 ER visits each month.  The patient has a past medical history of a solitary kidney. She is post a left nephrectomy for XGP complicated by retroperitoneal abscess requiring percutaneous drainage. Her procedure was completed by Dr. Najera on 12/26/2021. She also has a history of factor 5 Leiden, diabetes mellitus type 2, uncontrolled DM with neuropathy, currently on insulin, hypertension, dyslipidemia, hypothyroidism, renal stones and gout. She also has a history of atrial fibrillation, currently on Eliquis and has had a CVA. She has history of GERD and ovarian cancer, PCOS with multiple admissions to the ER at  for evaluation.     Her recent CT scan completed at  07/19/24 is unremarkable. Kidneys: Left nephrectomy. The right kidney is unremarkable. No renal or ureteral calculi. No hydronephrosis.  Nevertheless, she returns to clinic today for evaluation secondary to worsening right greater than left flank pain, and  intermittent urinary incontinence. She is currently not taking any medications for the bladder. Urinalysis in clinic today is completely negative for leukocyte esterase, it is negative for nitrites, is negative for gross/microscopic hematuria. Unable to get a PVR secondary to BMI greater than 55.86KG with extensive abdominal cavity.    OVERALL, The patient reports experiencing significant pain in her right flank region. She was evaluated at  on 07/27/2023 and is uncertain of the planned procedure. She denies experiencing fevers or chills, but reports significant nausea, difficulty urinating, and increased urinary frequency. Despite increasing her water intake, she struggles to pass urine. She is not currently taking Flomax. For nausea, she takes Zofran, which provides some relief. She has previously found Phenergan beneficial. She denies any pelvic pressure or discomfort. She had a kidney infection a few weeks ago, for which she completed a course of antibiotics. She has a history of left-sided flank pain, which was surgically removed. She has tried Tylenol, which provides temporary relief. She is unable to take ibuprofen due to her anticoagulant therapy. She has a history of kidney stones. She has not considered a referral to pain management. She has been taking fiber pills for constipation, which have been normal. She has a history of bowel issues. She has tried Flexeril and Robaxin in the past, but found them ineffective. She uses disposable diapers monthly for urinary incontinence.     She does not require bladder medications at this time.    Supplemental Information  Her A1c is 9.9.    ALLERGIES  She is allergic to TRAMADOL.      CT Abdomen Pelvis With Contrast 07/19/24  Kidneys: Left nephrectomy. The right kidney is unremarkable. No renal or ureteral calculi. No hydronephrosis. Unchanged uncomplicated left lumbar hernia with with the lateral aspect of stomach within the hernial sac.Moderate pancolonic stool  burden. Correlate for constipation.Hepatosplenomegaly.  No acute abnormality within the abdomen.    The rest of her PMHx as noted below  Active Ambulatory Problems     Diagnosis Date Noted    Nephrolithiasis 08/17/2016    Ovarian teratoma, right 04/25/2019    Other chronic pain 04/25/2019    Pelvic pain 04/25/2019    History of DVT in adulthood 04/25/2019    History of pulmonary embolism 04/25/2019    Ureteral calculus 10/01/2021    Ischemic cerebrovascular accident (CVA)     Primary hypertension     Left lumbar radiculopathy     PTSD (post-traumatic stress disorder)     Acute kidney failure, unspecified     Anemia     Dyslipidemia     Hypothyroid     Other secondary gout, multiple sites     Factor 5 Leiden mutation, heterozygous     Vitamin D deficiency     Vitamin B 12 deficiency     Type 2 diabetes mellitus with hyperglycemia, with long-term current use of insulin     Hoarseness, persistent 05/01/2022    Ureterolithiasis 06/29/2022    Acute cystitis without hematuria 08/01/2022    Hepatic steatosis 08/27/2022    Right flank pain, chronic 07/31/2024    Detrusor instability of bladder 07/31/2024    Frequency of micturition 07/31/2024     Resolved Ambulatory Problems     Diagnosis Date Noted    Morbid obesity with BMI of 50.0-59.9, adult 04/25/2019    Renal calculus, left 01/16/2021    Pressure injury of deep tissue of sacral region     Retroperitoneal hematoma 12/26/2021    Severe obesity 10/23/2018    Bacteremia 03/21/2022    Perinephric abscess 03/27/2022    Preeclampsia      Past Medical History:   Diagnosis Date    A-fib     Abnormal ECG     Anxiety     Asthma     Cancer     Depression     Diabetes mellitus     DVT (deep venous thrombosis)     Fibroid     GERD (gastroesophageal reflux disease)     Gout     H/O abdominal abscess     History of sepsis     History of transfusion     Hyperlipidemia     Kidney stone     Migraines     Neuropathy     Ovarian cancer 02/2021    Ovarian cyst     PE (pulmonary embolism)   "   Polycystic ovary syndrome     Rh incompatibility     Stroke     TIA (transient ischemic attack)     Urinary tract infection     Varicella       Objective   Vital Signs:   /98 (BP Location: Left arm, Patient Position: Sitting, Cuff Size: Adult)   Pulse 102   Ht 162.6 cm (64.02\")   Wt (!) 148 kg (325 lb 9.6 oz)   BMI 55.86 kg/m²       ROS:   Review of Systems   Constitutional:  Positive for activity change, appetite change, fatigue and unexpected weight gain. Negative for chills, diaphoresis, fever and unexpected weight loss.   HENT: Negative.  Negative for congestion.    Eyes: Negative.  Negative for blurred vision, double vision, photophobia, pain, redness and visual disturbance.   Respiratory:  Positive for cough, shortness of breath and wheezing. Negative for apnea and chest tightness.    Gastrointestinal:  Positive for abdominal distention, abdominal pain, constipation, nausea and GERD. Negative for diarrhea and vomiting.   Endocrine: Negative for polydipsia, polyphagia and polyuria.   Genitourinary:  Positive for flank pain (Right greater than left side), frequency, pelvic pain, pelvic pressure and urinary incontinence. Negative for decreased urine volume, difficulty urinating, dyspareunia, dysuria, genital sores, hematuria, urgency and vaginal discharge.   Musculoskeletal:  Positive for back pain and myalgias. Negative for arthralgias and joint swelling.   Skin:  Positive for color change and dry skin. Negative for pallor, rash and wound.   Allergic/Immunologic: Negative.    Neurological: Negative.  Negative for dizziness, headache, memory problem and confusion.   Hematological: Negative.    Psychiatric/Behavioral:  Positive for sleep disturbance and stress. Negative for behavioral problems and decreased concentration.         Physical Exam  Constitutional:       General: She is in acute distress.      Appearance: She is well-developed. She is obese. She is ill-appearing.   HENT:      Head: " Normocephalic and atraumatic.   Eyes:      Pupils: Pupils are equal, round, and reactive to light.   Neck:      Thyroid: No thyromegaly.      Trachea: No tracheal deviation.   Cardiovascular:      Rate and Rhythm: Normal rate and regular rhythm.      Heart sounds: No murmur heard.  Pulmonary:      Effort: Pulmonary effort is normal. No respiratory distress.      Breath sounds: Normal breath sounds. No stridor. No wheezing.   Abdominal:      General: Bowel sounds are normal. There is distension.      Palpations: Abdomen is soft.      Tenderness: There is abdominal tenderness. There is guarding.   Genitourinary:     Labia:         Right: No tenderness.         Left: No tenderness.       Vagina: Normal. No vaginal discharge.   Musculoskeletal:         General: Tenderness present. No deformity. Normal range of motion.      Cervical back: Normal range of motion.      Comments: Chronic right greater than left-sided flank pain rated 7/10   Skin:     General: Skin is warm and dry.      Capillary Refill: Capillary refill takes less than 2 seconds.      Coloration: Skin is not pale.      Findings: No erythema or rash.   Neurological:      Mental Status: She is alert and oriented to person, place, and time.      Cranial Nerves: No cranial nerve deficit.      Sensory: No sensory deficit.      Motor: Weakness present.      Coordination: Coordination normal.   Psychiatric:         Behavior: Behavior normal.         Thought Content: Thought content normal.         Judgment: Judgment normal.        Physical Exam    Result Review :     UA          7/19/2024    17:25 7/27/2024    12:56 7/31/2024    13:30   Urinalysis   Squamous Epithelial Cells, UA 6-10         Specific Gravity, UA 1.026     >=1.030        Ketones, UA   Negative    Blood, UA Large     Negative        Leukocytes, UA Small     Negative     Negative    RBC, UA >50         Bacteria, UA Present            Details          This result is from an external source.              Urine Culture          8/15/2023    04:22 8/17/2023    12:11   Urine Culture   Urine Culture 25,000 CFU/mL Normal Urogenital Margo  Yeast isolated               Assessment and Plan    Problem List Items Addressed This Visit          Gastrointestinal Abdominal     Right flank pain, chronic - Primary    Relevant Medications    promethazine (PHENERGAN) 25 MG tablet    tamsulosin (FLOMAX) 0.4 MG capsule 24 hr capsule    sennosides-docusate (PERICOLACE) 8.6-50 MG per tablet       Genitourinary and Reproductive     Acute cystitis without hematuria    Relevant Medications    oxyCODONE-acetaminophen (PERCOCET) 5-325 MG per tablet    promethazine (PHENERGAN) 25 MG tablet    tamsulosin (FLOMAX) 0.4 MG capsule 24 hr capsule    Detrusor instability of bladder    Relevant Medications    tamsulosin (FLOMAX) 0.4 MG capsule 24 hr capsule    Frequency of micturition    Relevant Medications    tamsulosin (FLOMAX) 0.4 MG capsule 24 hr capsule    Other Relevant Orders    POC Urinalysis Dipstick, Automated (Completed)    Urine Culture - Urine, Urine, Clean Catch     Other Visit Diagnoses       OAB (overactive bladder)        Relevant Medications    tamsulosin (FLOMAX) 0.4 MG capsule 24 hr capsule    Chronic idiopathic constipation        Relevant Medications    sennosides-docusate (PERICOLACE) 8.6-50 MG per tablet            Assessment & Plan                                                ASSESSMENT     CHRONIC BACK PAIN /BILATERAL FLANK PAIN/ACUTE CYSTITIS/OAB/DI/NEPHROLITHIASIS-RESOLVED  Ms. Natalia Arzate is a pleasant 39-year-old female with significant debility from CVA, a complex medical history complicated by solitary right kidney post left nephrectomy 2021, clinic pain syndrome, history of recurrent kidney stones, acute cystitis with hematuria, R>L flank pain, who returns to clinic today for evaluation.  Patient is again an ED follow-up.        She however continues to report right-sided flank pain radiating to her lower  back, right lower quadrant abdomen, causing significant pelvic pressure and suprapubic discomfort, with difficulty maintaining any comfortable sitting position.  She is wheelchair dependent, utilizes a walker sometimes.  HER Flank pain, lower back pain which is chronic in origin, with urinary symptoms of frequency and urgency today consistent with acute cystitis has been ongoing.  However her urine dipstick is negative for any leukocyte esterase negative nitrates, negative for gross microscopic hematuria.  Recent CT is unremarkable besides a lumbar hernia.  Patient is requesting pain management    IBS- Patient has significant irritable bowel syndrome, characterized by extreme amounts of constipation.  We spoke about the impact of this on bladder function.  We spoke about  its relationship to recurrent urinary tract infections, bladder pain and discomfort, right lower quadrant abdominal pain, back pain, pelvic pressure and suprapubic discomfort. We discussed the need for increasing p.o. fluid intake to at least 2 to 3 L of water daily, and discussed the physiology of colonic motility as well as use of MiraLAX as a bulk laxative versus the newer class of serotonin uptake blockers such as Linzess.  We stressed the need for a daily bowel movement and discussed the Roosevelt stool scale at length.      We also discussed a referral to the GI nurse practitioner                                                           PLAN  We discussed follow-up with her PCP to discuss referral to pain management-chronic back/flank pain    We will resend her urine for culture, I will call patient with results if any positive bacterial growth.     SHE  Post extensive antibiotic therapy during hospitalization at -discussed only treating positive for infection     SHE Has been encouraged to increase her p.o. fluid intake to at least 2 to 3 L daily,     Discussed a kidney stone prevention diet to include increasing p.o. fluid intake, to at  least 1 to 2 L of water daily.  She is to avoid caffeine products such as cola, coffee, and to avoid soft or soda drinks.  She is to decrease her sodium consumption as in  Fast foods, palomino, salted nuts, canned foods, and smoked/cured foods. She is also to decrease her oxalate consumption, as in spinach, Edin greens, and Rhubarb.  Also important is to decrease protein intake, as in red meats, peanut butter, and also avoid nuts.    Oxycodone 5 mg every 12 hours as needed #10 given to patient today-severe pain 10 7/10      We discussed OTHER treatment options for her flank pain with patient encouraged to continue conservative therapy alternating NSAID/Tylenol as tolerated, Also including hot baths, showers, warm compresses to lower back as tolerated. Also encouraged walking, physical therapy, light exercises as tolerated     Rest is the most important treatment in the case of flank pain. Rest and physical therapy are enough to improve minor pain. Discussed to monitor her daily routine with prevention of flank pain by: At least Drinking 8 glasses of water per day, Limiting your alcohol consumption.  Having a healthy diet containing fruits, vegetables, and a lot of fluids, Practicing safe sex.  Also maintaining proper hygiene of your body as well as the environment.     Return to clinic for follow-up as discussed, sooner if need be.     Patient is agreeable plan of care     Narcotic pain medication-The Patient has significant acute pain that I believe would be an indication for the use of narcotic pain medication.  I discussed the significant risks of pain medication and the fact that this will be a short only option and I will give her no more than a three-day supply of pain medication.  And I will not plan long-term medication and that  PATIENT will be sent to a pain clinic if at all becomes necessary.       We discussed signing a pain medication agreement and the fact that we're going to run a [facility] review  to be sure the patient is not getting pain medication from elsewhere.  If this is the case we will not give pain medication.  As part of the patient's treatment plan if they are being prescribed a controlled substance with potential for abuse.       This patient has been made aware of the appropriate dose of such medications including, the risk for somnolence, limited ability to drive and/or safety and the significant potential for overdose.  It has been made clear that these medications are for the prescribed patient only without concomitant use of alcohol or other CONTROLLED substances unless prescribed by the medical provider.  THE PATIENT Has completed prescribing agreement detailing the terms TO continue prescribing him a controlled substance,  Including monitoring PRANAY REPORTS, the possibility of urine drug screens and PILL Counts. The patient is aware that we review PRANAY reports on a regular basis and scan them into the chart.  History and physical examination PORTRAYED continued safe and appropriate use of controlled substances.     FINALLY, Also discussed the fact that the new [place] legislation allows only a THREE-DAY prescription for pain medication.  In this situation he will be referred to a chronic pain clinic if need be .     THE PATIENT IS AGREEABLE WITH PLAN OF CARE.    1. Right flank pain-summary.  Pain management referral patient to discuss with ---PCP  A prescription for oxycodone 5/325, to be taken every 12 hours as needed, has been issued. Additionally, promethazine has been prescribed for nausea, and tamsulosin has been prescribed for Detrisone stability.    Follow-up  A follow-up appointment in the clinic is scheduled for 6 months from now, with the provision for an earlier visit if necessary.  Patient reports that she is not currently experiencing any symptoms of urinary incontinence.      RADIOLOGY (CT AND/OR KUB):    CT Abdomen and Pelvis: Results for orders placed during the  hospital encounter of 08/19/22    CT Abdomen Pelvis With & Without Contrast    Narrative  EXAM:  CT Abdomen and Pelvis Without and With Intravenous Contrast    EXAM DATE:  8/19/2022 11:34 AM    CLINICAL HISTORY:  right flank pain    TECHNIQUE:  Axial computed tomography images of the abdomen and pelvis without and  with intravenous contrast.  Sagittal and coronal reformatted images were  created and reviewed.  This CT exam was performed using one or more of  the following dose reduction techniques:  automated exposure control,  adjustment of the mA and/or kV according to patient size, and/or use of  iterative reconstruction technique.    COMPARISON:  08/01/2022    FINDINGS:  Lung bases:  The lung bases are clear.    ABDOMEN:  Liver:  Marked diffuse fatty infiltration of liver with hepatomegaly.  Gallbladder and bile ducts:  Cholecystectomy clips are present.  No  ductal dilation.  Pancreas:  Unremarkable.  No mass.  No ductal dilation.  Spleen:  Spleen is unenlarged.  Adrenals:  The adrenals are unremarkable.  Kidneys and ureters:  Left ureteral stent is again noted with  decompression of the collecting system. No hydronephrosis.  Small  nonobstructing right kidney stones.  No left-sided kidney stones  identified.  No solid enhancing renal masses.  Stable appearance of left  kidney with area of high density adjacent to the left psoas muscle.  Stomach and bowel:  Unremarkable.  No obstruction.  No mucosal  thickening.    PELVIS:  Appendix:  No findings to suggest acute appendicitis.  Bladder:  Unremarkable.  No mass.  No stones.  Reproductive:  Unremarkable as visualized.    ABDOMEN and PELVIS:  Intraperitoneal space:  No pelvic free fluid.  No free air.  Bones/joints:  Stable appearance of the bony structures.  No acute  fracture.  No dislocation.  Soft tissues:  Unremarkable.  Vasculature:  Unremarkable.  No abdominal aortic aneurysm.  Lymph nodes:  No iliac or retroperitoneal lymphadenopathy.    Impression  1.   Nonobstructing right kidney stones. No hydronephrosis.  2.  Stable appearance and positioning of left ureteral stent. No  left-sided hydronephrosis.  3.  Stable diffuse fatty infiltration of liver with hepatomegaly.  4.  Other incidental and nonacute findings as above.    This report was finalized on 8/19/2022 12:11 PM by Dr. Spencer Hightower MD.     CT Stone Protocol: Results for orders placed during the hospital encounter of 07/08/24    CT Abdomen Pelvis Stone Protocol    Narrative  EXAM:  CT Abdomen and Pelvis Without Intravenous Contrast    EXAM DATE:  7/8/2024 12:44 PM    CLINICAL HISTORY:  right flank pain    TECHNIQUE:  Axial computed tomography images of the abdomen and pelvis without  intravenous contrast.  Sagittal and coronal reformatted images were  created and reviewed.  This CT exam was performed using one or more of  the following dose reduction techniques:  automated exposure control,  adjustment of the mA and/or kV according to patient size, and/or use of  iterative reconstruction technique.    COMPARISON:  5/13/2024    FINDINGS:  Lung bases:  Unremarkable as visualized.  No mass.  No consolidation.    ABDOMEN:  Liver:  Focal low-density lesion right hepatic lobe at the periphery  stable from previous and most consistent with more focal area of  advanced fatty infiltration.  Marked fatty infiltration of liver with  hepatomegaly.  Gallbladder and bile ducts:  Cholecystectomy.  No ductal dilation.  Pancreas:  Unremarkable as visualized.  No ductal dilation.  Spleen:  Splenomegaly, 20.4 cm length of spleen.  Adrenals:  Unremarkable as visualized.  No mass.  Kidneys and ureters:  Left nephrectomy.  Phleboliths lower pelvis but  no ureteral stones identified. No obstructive uropathy.  Stomach and bowel:  Moderate degree of constipation. No bowel  obstruction.  No mucosal thickening.    PELVIS:  Appendix:  Normal appendix.  Bladder:  Unremarkable as visualized.  No stones.  Reproductive:  Unremarkable as  visualized.    ABDOMEN and PELVIS:  Intraperitoneal space:  Unremarkable as visualized.  No free air.  No  significant fluid collection.  Bones/joints:  Stable mild degenerative changes lumbar spine.  No  acute fracture.  No dislocation.  Soft tissues:  Unremarkable as visualized.  Vasculature:  Unremarkable as visualized.  No abdominal aortic  aneurysm.  Lymph nodes:  Unremarkable as visualized.  No enlarged lymph nodes.    Impression  1.  Marked fatty infiltration of liver with hepatomegaly.  2.  Cholecystectomy.  3.  Splenomegaly, 20.4 cm length of spleen.  4.  Left nephrectomy.  5.  Phleboliths lower pelvis but no ureteral stones identified. No  obstructive uropathy.  6.  Moderate degree of constipation. No bowel obstruction.  7. Other nonacute findings above.      This report was finalized on 7/8/2024 1:20 PM by Dr. Spencer Hightower MD.     KUB: Results for orders placed during the hospital encounter of 11/24/23    XR Abdomen KUB    Addendum 1/4/2024  6:03 PM  COMPARISON:  NONE      This report was finalized on 1/4/2024 6:03 PM by Elian Ovalle MD.    Narrative  PROCEDURE: X-ray examination of the abdomen performed on November 24, 2023. Supine views obtained. 3 films.    HISTORY: Flank pain.    FINDINGS:    Nonobstructive bowel gas pattern.  Mild degenerative disc disease in the lumbar spine with very slight  levoconvex curve.  Small bowel wall thickening suggestive of underlying enteritis.  No calcification identified in the expected location of the right or  left kidney nor along the expected location of the right and left  ureter.  No calcification in the expected location of the bladder.    Impression  1.  Nonobstructive bowel gas pattern  2.  No free air.  3.  Possible underlying enteritis.  4.  No renal, ureter, or bladder stone identified.    This report was finalized on 11/24/2023 4:48 AM by Elian Ovalle MD.       [unfilled]  LABS (3 MONTHS):    Office Visit on 07/31/2024   Component Date Value Ref  Range Status    Color 07/31/2024 Yellow  Yellow, Straw, Dark Yellow, Latisha Final    Clarity, UA 07/31/2024 Clear  Clear Final    Specific Gravity  07/31/2024 1.015  1.005 - 1.030 Final    pH, Urine 07/31/2024 5.0  5.0 - 8.0 Final    Leukocytes 07/31/2024 Negative  Negative Final    Nitrite, UA 07/31/2024 Negative  Negative Final    Protein, POC 07/31/2024 1+ (A)  Negative mg/dL Final    Glucose, UA 07/31/2024 3+ (A)  Negative mg/dL Final    Ketones, UA 07/31/2024 Negative  Negative Final    Urobilinogen, UA 07/31/2024 Normal  Normal, 0.2 E.U./dL Final    Bilirubin 07/31/2024 Negative  Negative Final    Blood, UA 07/31/2024 Negative  Negative Final    Lot Number 07/31/2024 [id number]   Final    Expiration Date 07/31/2024 10/25/2024   Final   Admission on 07/08/2024, Discharged on 07/08/2024   Component Date Value Ref Range Status    Glucose 07/08/2024 433 (C)  65 - 99 mg/dL Final    BUN 07/08/2024 17  6 - 20 mg/dL Final    Creatinine 07/08/2024 0.72  0.57 - 1.00 mg/dL Final    Sodium 07/08/2024 131 (L)  136 - 145 mmol/L Final    Potassium 07/08/2024 4.4  3.5 - 5.2 mmol/L Final    Chloride 07/08/2024 94 (L)  98 - 107 mmol/L Final    CO2 07/08/2024 19.3 (L)  22.0 - 29.0 mmol/L Final    Calcium 07/08/2024 9.8  8.6 - 10.5 mg/dL Final    Total Protein 07/08/2024 7.5  6.0 - 8.5 g/dL Final    Albumin 07/08/2024 4.2  3.5 - 5.2 g/dL Final    ALT (SGPT) 07/08/2024 31  1 - 33 U/L Final    AST (SGOT) 07/08/2024 36 (H)  1 - 32 U/L Final    Alkaline Phosphatase 07/08/2024 105  39 - 117 U/L Final    Total Bilirubin 07/08/2024 0.3  0.0 - 1.2 mg/dL Final    Globulin 07/08/2024 3.3  gm/dL Final    A/G Ratio 07/08/2024 1.3  g/dL Final    BUN/Creatinine Ratio 07/08/2024 23.6  7.0 - 25.0 Final    Anion Gap 07/08/2024 17.7 (H)  5.0 - 15.0 mmol/L Final    eGFR 07/08/2024 109.9  >60.0 mL/min/1.73 Final    Color, UA 07/08/2024 Yellow  Yellow, Straw Final    Appearance, UA 07/08/2024 Clear  Clear Final    pH, UA 07/08/2024 5.5  5.0 - 8.0  Final    Specific Gravity, UA 07/08/2024 >1.030 (H)  1.005 - 1.030 Final    Glucose, UA 07/08/2024 >=1000 mg/dL (3+) (A)  Negative Final    Ketones, UA 07/08/2024 15 mg/dL (1+) (A)  Negative Final    Bilirubin, UA 07/08/2024 Negative  Negative Final    Blood, UA 07/08/2024 Negative  Negative Final    Protein, UA 07/08/2024 30 mg/dL (1+) (A)  Negative Final    Leuk Esterase, UA 07/08/2024 Negative  Negative Final    Nitrite, UA 07/08/2024 Negative  Negative Final    Urobilinogen, UA 07/08/2024 0.2 E.U./dL  0.2 - 1.0 E.U./dL Final    Lipase 07/08/2024 39  13 - 60 U/L Final    C-Reactive Protein 07/08/2024 1.42 (H)  0.00 - 0.50 mg/dL Final    WBC 07/08/2024 5.10  3.40 - 10.80 10*3/mm3 Final    RBC 07/08/2024 3.88  3.77 - 5.28 10*6/mm3 Final    Hemoglobin 07/08/2024 11.6 (L)  12.0 - 15.9 g/dL Final    Hematocrit 07/08/2024 34.8  34.0 - 46.6 % Final    MCV 07/08/2024 89.7  79.0 - 97.0 fL Final    MCH 07/08/2024 29.9  26.6 - 33.0 pg Final    MCHC 07/08/2024 33.3  31.5 - 35.7 g/dL Final    RDW 07/08/2024 15.1  12.3 - 15.4 % Final    RDW-SD 07/08/2024 48.9  37.0 - 54.0 fl Final    MPV 07/08/2024 9.2  6.0 - 12.0 fL Final    Platelets 07/08/2024 171  140 - 450 10*3/mm3 Final    Neutrophil % 07/08/2024 57.2  42.7 - 76.0 % Final    Lymphocyte % 07/08/2024 30.2  19.6 - 45.3 % Final    Monocyte % 07/08/2024 9.4  5.0 - 12.0 % Final    Eosinophil % 07/08/2024 2.2  0.3 - 6.2 % Final    Basophil % 07/08/2024 0.8  0.0 - 1.5 % Final    Immature Grans % 07/08/2024 0.2  0.0 - 0.5 % Final    Neutrophils, Absolute 07/08/2024 2.92  1.70 - 7.00 10*3/mm3 Final    Lymphocytes, Absolute 07/08/2024 1.54  0.70 - 3.10 10*3/mm3 Final    Monocytes, Absolute 07/08/2024 0.48  0.10 - 0.90 10*3/mm3 Final    Eosinophils, Absolute 07/08/2024 0.11  0.00 - 0.40 10*3/mm3 Final    Basophils, Absolute 07/08/2024 0.04  0.00 - 0.20 10*3/mm3 Final    Immature Grans, Absolute 07/08/2024 0.01  0.00 - 0.05 10*3/mm3 Final    nRBC 07/08/2024 0.0  0.0 - 0.2 /100 WBC  Final    Extra Tube 07/08/2024 Hold for add-ons.   Final    Auto resulted.    Extra Tube 07/08/2024 hold for add-on   Final    Auto resulted    Extra Tube 07/08/2024 Hold for add-ons.   Final    Auto resulted.    Extra Tube 07/08/2024 Hold for add-ons.   Final    Auto resulted    Acetone 07/08/2024 Negative  Negative Final    RBC, UA 07/08/2024 None Seen  None Seen, 0-2 /HPF Final    WBC, UA 07/08/2024 0-2  None Seen, 0-2 /HPF Final    Bacteria, UA 07/08/2024 None Seen  None Seen /HPF Final    Squamous Epithelial Cells, UA 07/08/2024 0-2  None Seen, 0-2 /HPF Final    Hyaline Casts, UA 07/08/2024 None Seen  None Seen /LPF Final    Methodology 07/08/2024 Automated Microscopy   Final    Glucose 07/08/2024 409 (C)  70 - 130 mg/dL Final   Admission on 05/13/2024, Discharged on 05/13/2024   Component Date Value Ref Range Status    Extra Tube 05/13/2024 Hold for add-ons.   Final    Auto resulted.    Extra Tube 05/13/2024 hold for add-on   Final    Auto resulted    Extra Tube 05/13/2024 Hold for add-ons.   Final    Auto resulted.    Extra Tube 05/13/2024 Hold for add-ons.   Final    Auto resulted    Lipase 05/13/2024 30  13 - 60 U/L Final    Glucose 05/13/2024 313 (H)  65 - 99 mg/dL Final    BUN 05/13/2024 14  6 - 20 mg/dL Final    Creatinine 05/13/2024 0.77  0.57 - 1.00 mg/dL Final    Sodium 05/13/2024 134 (L)  136 - 145 mmol/L Final    Potassium 05/13/2024 4.7  3.5 - 5.2 mmol/L Final    Slight hemolysis detected by analyzer. Result may be falsely elevated.    Chloride 05/13/2024 95 (L)  98 - 107 mmol/L Final    CO2 05/13/2024 23.1  22.0 - 29.0 mmol/L Final    Calcium 05/13/2024 10.2  8.6 - 10.5 mg/dL Final    Total Protein 05/13/2024 8.2  6.0 - 8.5 g/dL Final    Albumin 05/13/2024 4.4  3.5 - 5.2 g/dL Final    ALT (SGPT) 05/13/2024 35 (H)  1 - 33 U/L Final    AST (SGOT) 05/13/2024 47 (H)  1 - 32 U/L Final    Alkaline Phosphatase 05/13/2024 121 (H)  39 - 117 U/L Final    Total Bilirubin 05/13/2024 0.5  0.0 - 1.2 mg/dL Final     Globulin 05/13/2024 3.8  gm/dL Final    A/G Ratio 05/13/2024 1.2  g/dL Final    BUN/Creatinine Ratio 05/13/2024 18.2  7.0 - 25.0 Final    Anion Gap 05/13/2024 15.9 (H)  5.0 - 15.0 mmol/L Final    eGFR 05/13/2024 101.4  >60.0 mL/min/1.73 Final    Magnesium 05/13/2024 1.6  1.6 - 2.6 mg/dL Final    WBC 05/13/2024 5.38  3.40 - 10.80 10*3/mm3 Final    RBC 05/13/2024 4.32  3.77 - 5.28 10*6/mm3 Final    Hemoglobin 05/13/2024 12.9  12.0 - 15.9 g/dL Final    Hematocrit 05/13/2024 38.3  34.0 - 46.6 % Final    MCV 05/13/2024 88.7  79.0 - 97.0 fL Final    MCH 05/13/2024 29.9  26.6 - 33.0 pg Final    MCHC 05/13/2024 33.7  31.5 - 35.7 g/dL Final    RDW 05/13/2024 14.3  12.3 - 15.4 % Final    RDW-SD 05/13/2024 45.1  37.0 - 54.0 fl Final    MPV 05/13/2024 9.8  6.0 - 12.0 fL Final    Platelets 05/13/2024 220  140 - 450 10*3/mm3 Final    Neutrophil % 05/13/2024 57.4  42.7 - 76.0 % Final    Lymphocyte % 05/13/2024 30.3  19.6 - 45.3 % Final    Monocyte % 05/13/2024 8.2  5.0 - 12.0 % Final    Eosinophil % 05/13/2024 2.6  0.3 - 6.2 % Final    Basophil % 05/13/2024 0.9  0.0 - 1.5 % Final    Immature Grans % 05/13/2024 0.6 (H)  0.0 - 0.5 % Final    Neutrophils, Absolute 05/13/2024 3.09  1.70 - 7.00 10*3/mm3 Final    Lymphocytes, Absolute 05/13/2024 1.63  0.70 - 3.10 10*3/mm3 Final    Monocytes, Absolute 05/13/2024 0.44  0.10 - 0.90 10*3/mm3 Final    Eosinophils, Absolute 05/13/2024 0.14  0.00 - 0.40 10*3/mm3 Final    Basophils, Absolute 05/13/2024 0.05  0.00 - 0.20 10*3/mm3 Final    Immature Grans, Absolute 05/13/2024 0.03  0.00 - 0.05 10*3/mm3 Final    nRBC 05/13/2024 0.0  0.0 - 0.2 /100 WBC Final   Lab on 05/06/2024   Component Date Value Ref Range Status    C-Reactive Protein 05/06/2024 1.67 (H)  0.00 - 0.50 mg/dL Final    Ferritin 05/06/2024 103.40  13.00 - 150.00 ng/mL Final    Iron 05/06/2024 70  37 - 145 mcg/dL Final    Iron Saturation (TSAT) 05/06/2024 14 (L)  20 - 50 % Final    Transferrin 05/06/2024 331  200 - 360 mg/dL Final     TIBC 05/06/2024 493  298 - 536 mcg/dL Final    WBC 05/06/2024 5.06  3.40 - 10.80 10*3/mm3 Final    RBC 05/06/2024 4.33  3.77 - 5.28 10*6/mm3 Final    Hemoglobin 05/06/2024 12.6  12.0 - 15.9 g/dL Final    Hematocrit 05/06/2024 38.4  34.0 - 46.6 % Final    MCV 05/06/2024 88.7  79.0 - 97.0 fL Final    MCH 05/06/2024 29.1  26.6 - 33.0 pg Final    MCHC 05/06/2024 32.8  31.5 - 35.7 g/dL Final    RDW 05/06/2024 14.0  12.3 - 15.4 % Final    RDW-SD 05/06/2024 45.0  37.0 - 54.0 fl Final    MPV 05/06/2024 9.2  6.0 - 12.0 fL Final    Platelets 05/06/2024 206  140 - 450 10*3/mm3 Final    Neutrophil % 05/06/2024 54.7  42.7 - 76.0 % Final    Lymphocyte % 05/06/2024 32.4  19.6 - 45.3 % Final    Monocyte % 05/06/2024 8.7  5.0 - 12.0 % Final    Eosinophil % 05/06/2024 3.2  0.3 - 6.2 % Final    Basophil % 05/06/2024 0.6  0.0 - 1.5 % Final    Immature Grans % 05/06/2024 0.4  0.0 - 0.5 % Final    Neutrophils, Absolute 05/06/2024 2.77  1.70 - 7.00 10*3/mm3 Final    Lymphocytes, Absolute 05/06/2024 1.64  0.70 - 3.10 10*3/mm3 Final    Monocytes, Absolute 05/06/2024 0.44  0.10 - 0.90 10*3/mm3 Final    Eosinophils, Absolute 05/06/2024 0.16  0.00 - 0.40 10*3/mm3 Final    Basophils, Absolute 05/06/2024 0.03  0.00 - 0.20 10*3/mm3 Final    Immature Grans, Absolute 05/06/2024 0.02  0.00 - 0.05 10*3/mm3 Final    nRBC 05/06/2024 0.0  0.0 - 0.2 /100 WBC Final          Follow Up   Return in about 6 months (around 1/31/2025) for Next scheduled follow up FOR CHRONIC RIGHT FLANK PAIN/ACUTE CYSTITIS/OAB/DI.    Patient was given instructions and counseling regarding her condition or for health maintenance advice. Please see specific information pulled into the AVS if appropriate.          This document has been electronically signed by Griselda Cheng-Akwa, APRN   July 31, 2024 18:24 EDT      Dictated Utilizing Dragon Dictation: Part of this note may be an electronic transcription/translation of spoken language to printed text using the Dragon Dictation  System.      Patient or patient representative verbalized consent for the use of Ambient Listening during the visit with  Griselda Cheng-Akwa, APRN for chart documentation. 7/31/2024  18:23 EDT

## 2024-08-02 LAB — BACTERIA SPEC AEROBE CULT: NORMAL

## 2024-08-06 ENCOUNTER — TELEPHONE (OUTPATIENT)
Dept: UROLOGY | Facility: CLINIC | Age: 39
End: 2024-08-06
Payer: COMMERCIAL

## 2024-08-06 NOTE — TELEPHONE ENCOUNTER
I called pt with negative urine culture results left voicemail.     ----- Message from Griselda Cheng-Akwa sent at 8/5/2024 11:56 PM EDT -----  Please kindly let patient know her urine culture results SHOW MIXED JOE ISOLATED OTHERWISE are Negative for any bacteria infection at this time    Urine Culture - MIXED JOE ISOLATED    However, She may drop off another urine dip if she feels symptomatic.     CONTINUE ANTIBIOTIC SUPPRESSIVE THERAPY ONLY IF INDICATED.    If not INCREASE PO FLUIDS AND FOLLOW UP IN CLINIC S DISCUSSED.    THANK YOU

## 2024-08-28 ENCOUNTER — HOSPITAL ENCOUNTER (OUTPATIENT)
Dept: MAMMOGRAPHY | Facility: HOSPITAL | Age: 39
Discharge: HOME | End: 2024-08-28
Payer: COMMERCIAL

## 2024-08-28 ENCOUNTER — HOSPITAL ENCOUNTER (OUTPATIENT)
Dept: ULTRASOUND IMAGING | Facility: HOSPITAL | Age: 39
Discharge: HOME | End: 2024-08-28
Payer: COMMERCIAL

## 2024-08-28 DIAGNOSIS — N63.20 MASS OF LEFT BREAST, UNSPECIFIED QUADRANT: ICD-10-CM

## 2024-08-28 PROCEDURE — G0279 TOMOSYNTHESIS, MAMMO: HCPCS

## 2024-08-28 PROCEDURE — 76642 ULTRASOUND BREAST LIMITED: CPT

## 2024-08-28 PROCEDURE — 77066 DX MAMMO INCL CAD BI: CPT

## 2024-09-22 ENCOUNTER — APPOINTMENT (OUTPATIENT)
Dept: GENERAL RADIOLOGY | Facility: HOSPITAL | Age: 39
End: 2024-09-22
Payer: COMMERCIAL

## 2024-09-22 ENCOUNTER — HOSPITAL ENCOUNTER (EMERGENCY)
Facility: HOSPITAL | Age: 39
Discharge: HOME | End: 2024-09-22
Attending: STUDENT IN AN ORGANIZED HEALTH CARE EDUCATION/TRAINING PROGRAM | Admitting: STUDENT IN AN ORGANIZED HEALTH CARE EDUCATION/TRAINING PROGRAM
Payer: COMMERCIAL

## 2024-09-22 VITALS
BODY MASS INDEX: 50.02 KG/M2 | SYSTOLIC BLOOD PRESSURE: 139 MMHG | RESPIRATION RATE: 18 BRPM | HEART RATE: 87 BPM | HEIGHT: 64 IN | TEMPERATURE: 97.1 F | DIASTOLIC BLOOD PRESSURE: 95 MMHG | OXYGEN SATURATION: 94 % | WEIGHT: 293 LBS

## 2024-09-22 DIAGNOSIS — M25.512 ACUTE PAIN OF LEFT SHOULDER: Primary | ICD-10-CM

## 2024-09-22 DIAGNOSIS — R10.9 FLANK PAIN: ICD-10-CM

## 2024-09-22 LAB
ALBUMIN SERPL-MCNC: 3.8 G/DL (ref 3.5–5.2)
ALBUMIN/GLOB SERPL: 1.1 G/DL
ALP SERPL-CCNC: 107 U/L (ref 39–117)
ALT SERPL W P-5'-P-CCNC: 40 U/L (ref 1–33)
ANION GAP SERPL CALCULATED.3IONS-SCNC: 13.8 MMOL/L (ref 5–15)
AST SERPL-CCNC: 53 U/L (ref 1–32)
BACTERIA UR QL AUTO: NORMAL /HPF
BASOPHILS # BLD AUTO: 0.01 10*3/MM3 (ref 0–0.2)
BASOPHILS NFR BLD AUTO: 0.2 % (ref 0–1.5)
BILIRUB SERPL-MCNC: 0.4 MG/DL (ref 0–1.2)
BILIRUB UR QL STRIP: NEGATIVE
BUN SERPL-MCNC: 17 MG/DL (ref 6–20)
BUN/CREAT SERPL: 26.6 (ref 7–25)
CALCIUM SPEC-SCNC: 9.4 MG/DL (ref 8.6–10.5)
CHLORIDE SERPL-SCNC: 101 MMOL/L (ref 98–107)
CLARITY UR: CLEAR
CO2 SERPL-SCNC: 20.2 MMOL/L (ref 22–29)
COLOR UR: YELLOW
CREAT SERPL-MCNC: 0.64 MG/DL (ref 0.57–1)
DEPRECATED RDW RBC AUTO: 50.4 FL (ref 37–54)
EGFRCR SERPLBLD CKD-EPI 2021: 116.2 ML/MIN/1.73
EOSINOPHIL # BLD AUTO: 0.09 10*3/MM3 (ref 0–0.4)
EOSINOPHIL NFR BLD AUTO: 1.8 % (ref 0.3–6.2)
ERYTHROCYTE [DISTWIDTH] IN BLOOD BY AUTOMATED COUNT: 15.2 % (ref 12.3–15.4)
GLOBULIN UR ELPH-MCNC: 3.6 GM/DL
GLUCOSE SERPL-MCNC: 298 MG/DL (ref 65–99)
GLUCOSE UR STRIP-MCNC: ABNORMAL MG/DL
HCT VFR BLD AUTO: 33.9 % (ref 34–46.6)
HGB BLD-MCNC: 10.8 G/DL (ref 12–15.9)
HGB UR QL STRIP.AUTO: NEGATIVE
HYALINE CASTS UR QL AUTO: NORMAL /LPF
IMM GRANULOCYTES # BLD AUTO: 0.02 10*3/MM3 (ref 0–0.05)
IMM GRANULOCYTES NFR BLD AUTO: 0.4 % (ref 0–0.5)
KETONES UR QL STRIP: ABNORMAL
LEUKOCYTE ESTERASE UR QL STRIP.AUTO: NEGATIVE
LYMPHOCYTES # BLD AUTO: 1.17 10*3/MM3 (ref 0.7–3.1)
LYMPHOCYTES NFR BLD AUTO: 23.1 % (ref 19.6–45.3)
MCH RBC QN AUTO: 29.1 PG (ref 26.6–33)
MCHC RBC AUTO-ENTMCNC: 31.9 G/DL (ref 31.5–35.7)
MCV RBC AUTO: 91.4 FL (ref 79–97)
MONOCYTES # BLD AUTO: 0.49 10*3/MM3 (ref 0.1–0.9)
MONOCYTES NFR BLD AUTO: 9.7 % (ref 5–12)
NEUTROPHILS NFR BLD AUTO: 3.28 10*3/MM3 (ref 1.7–7)
NEUTROPHILS NFR BLD AUTO: 64.8 % (ref 42.7–76)
NITRITE UR QL STRIP: NEGATIVE
NRBC BLD AUTO-RTO: 0 /100 WBC (ref 0–0.2)
PH UR STRIP.AUTO: 5.5 [PH] (ref 5–8)
PLATELET # BLD AUTO: 162 10*3/MM3 (ref 140–450)
PMV BLD AUTO: 8.9 FL (ref 6–12)
POTASSIUM SERPL-SCNC: 4.4 MMOL/L (ref 3.5–5.2)
PROT SERPL-MCNC: 7.4 G/DL (ref 6–8.5)
PROT UR QL STRIP: ABNORMAL
RBC # BLD AUTO: 3.71 10*6/MM3 (ref 3.77–5.28)
RBC # UR STRIP: NORMAL /HPF
REF LAB TEST METHOD: NORMAL
SODIUM SERPL-SCNC: 135 MMOL/L (ref 136–145)
SP GR UR STRIP: 1.02 (ref 1–1.03)
SQUAMOUS #/AREA URNS HPF: NORMAL /HPF
UROBILINOGEN UR QL STRIP: ABNORMAL
WBC # UR STRIP: NORMAL /HPF
WBC NRBC COR # BLD AUTO: 5.06 10*3/MM3 (ref 3.4–10.8)

## 2024-09-22 PROCEDURE — 36415 COLL VENOUS BLD VENIPUNCTURE: CPT

## 2024-09-22 PROCEDURE — 80053 COMPREHEN METABOLIC PANEL: CPT | Performed by: NURSE PRACTITIONER

## 2024-09-22 PROCEDURE — 73060 X-RAY EXAM OF HUMERUS: CPT

## 2024-09-22 PROCEDURE — 73030 X-RAY EXAM OF SHOULDER: CPT | Performed by: RADIOLOGY

## 2024-09-22 PROCEDURE — 73060 X-RAY EXAM OF HUMERUS: CPT | Performed by: RADIOLOGY

## 2024-09-22 PROCEDURE — 81001 URINALYSIS AUTO W/SCOPE: CPT | Performed by: NURSE PRACTITIONER

## 2024-09-22 PROCEDURE — 73030 X-RAY EXAM OF SHOULDER: CPT

## 2024-09-22 PROCEDURE — 85025 COMPLETE CBC W/AUTO DIFF WBC: CPT | Performed by: NURSE PRACTITIONER

## 2024-09-22 PROCEDURE — 99283 EMERGENCY DEPT VISIT LOW MDM: CPT

## 2024-09-22 RX ORDER — OXYCODONE AND ACETAMINOPHEN 5; 325 MG/1; MG/1
1 TABLET ORAL ONCE
Status: COMPLETED | OUTPATIENT
Start: 2024-09-22 | End: 2024-09-22

## 2024-09-22 RX ORDER — ACETAMINOPHEN 500 MG
1000 TABLET ORAL ONCE
Status: COMPLETED | OUTPATIENT
Start: 2024-09-22 | End: 2024-09-22

## 2024-09-22 RX ADMIN — OXYCODONE HYDROCHLORIDE AND ACETAMINOPHEN 1 TABLET: 5; 325 TABLET ORAL at 14:16

## 2024-09-22 RX ADMIN — ACETAMINOPHEN 1000 MG: 500 TABLET ORAL at 11:28

## 2024-10-14 NOTE — ED NOTES
Infectious Diseases Inpatient Consult Note      Reason for Consult:   Right arm abscess  Requesting Physician:   Dr. Oliver  Primary Care Physician:  No primary care provider on file.  History Obtained From:   Pt, EPIC    Admit Date: 10/12/2024  Hospital Day: 2      HISTORY OF PRESENT ILLNESS:  This is a 43 y.o. male with negative past medical history was admitted to [facility]  from home through ER with right arm swelling, severe pain and purulent drainage with associated fevers and chills that started on Thursday.  Patient reported that he was in the holding room in [place] on Monday.  He is unsure if he had a bug bite.  Patient denies any history of trauma or skin infection.  Upon further evaluation, patient was found to have large right arm abscess.  He was admitted and was started on IV vancomycin and Ancef.  Blood and wound cultures are pending.  Patient is scheduled to go for surgery today.  Patient has chronic low back pain with bilateral leg numbness of 5 months duration.     Patient denies any medical history or any medications prior to admission  No past medical history on file.    No past surgical history on file.    Current Medications:     vancomycin (VANCOCIN) intermittent dosing (placeholder)   Other RX Placeholder    ceFAZolin  2,000 mg IntraVENous Q8H    sodium chloride flush  5-40 mL IntraVENous 2 times per day    vancomycin  15 mg/kg IntraVENous Q12H       Allergies:  Patient has no known allergies.    Social History     Socioeconomic History    Marital status: Single     Spouse name: Not on file    Number of children: Not on file    Years of education: Not on file    Highest education level: Not on file   Occupational History    Not on file   Tobacco Use    Smoking status: Every Day    Smokeless tobacco: Never   Substance and Sexual Activity    Alcohol use: Yes     Comment: socially    Drug use: Yes     Types: Marijuana (Weed)    Sexual activity: Not on file   Other Topics Concern    Not on file   Social  Pt provided urine collection supplies and advised the need for sample. Pt verbalized understanding     appropriate for [facility]: N/A    Electronically signed by AGAPITO Reynoso CNP on 10/14/2024 at 12:42 PM       vertebral tenderness  LUNGS:  No increased work of breathing, good air exchange, clear to auscultation bilaterally, no crackles or wheezing  CARDIOVASCULAR:  Normal apical impulse, regular rate and rhythm, normal S1 and S2, no S3 or S4, and no murmur noted  ABDOMEN: Soft nontender  MUSCULOSKELETAL: Abscess with fluctuance cellulitis and erythema right posterior lateral arm  NEUROLOGIC: Awake alert and oriented  SKIN:  no bruising or bleeding  DATA:    CBC:   Lab Results   Component Value Date/Time    WBC 14.7 10/13/2024 05:41 AM    RBC 3.65 10/13/2024 05:41 AM    HGB 11.6 10/13/2024 05:41 AM    HCT 34.3 10/13/2024 05:41 AM    MCV 94.0 10/13/2024 05:41 AM    MCH 31.8 10/13/2024 05:41 AM    MCHC 33.8 10/13/2024 05:41 AM    RDW 12.3 10/13/2024 05:41 AM     10/13/2024 05:41 AM    MPV 7.6 12/29/2014 05:20 AM     CMP:    Lab Results   Component Value Date/Time     10/13/2024 05:41 AM    K 4.0 10/13/2024 05:41 AM     10/13/2024 05:41 AM    CO2 28 10/13/2024 05:41 AM    BUN 6 10/13/2024 05:41 AM    CREATININE 0.81 10/13/2024 05:41 AM    GFRAA >60.0 12/29/2014 05:20 AM    LABGLOM >90.0 10/13/2024 05:41 AM    GLUCOSE 84 10/13/2024 05:41 AM    CALCIUM 9.0 10/13/2024 05:41 AM    BILITOT <0.2 10/13/2024 05:41 AM    ALKPHOS 94 10/13/2024 05:41 AM    AST 11 10/13/2024 05:41 AM    ALT 23 10/13/2024 05:41 AM     Radiology Review: CT of the right arm reviewed    IMPRESSION/RECOMMENDATIONS:      Right upper arm abscess    Risks and benefits of drainage for definitive treatment discussed.  Risks as outlined above    Patient wishes to proceed.  Consent obtained

## 2024-10-22 ENCOUNTER — TELEPHONE (OUTPATIENT)
Dept: ONCOLOGY | Facility: CLINIC | Age: 39
End: 2024-10-22

## 2024-10-22 NOTE — TELEPHONE ENCOUNTER
Caller: Mago Arzate    Relationship: Self    Best call back number: [phone]    What is the best time to reach you: ANY    Who are you requesting to speak with (clinical staff, provider,  specific staff member): CLINICAL     What was the call regarding: MAGO WAS ADMITTED TO  LAST SUNDAY FOR TROUBLE BREATHING, BEFORE SHE WAS DISCHARGED THEY FOUND A BLOOD CLOT IN HER LEFT LEG    SHE IS WANTING TO GET AN APPOINTMENT WITH DR MUNOZ     PLEASE ADVISE

## 2024-10-23 DIAGNOSIS — D64.9 NORMOCYTIC ANEMIA: Primary | ICD-10-CM

## 2024-10-23 DIAGNOSIS — Z86.718 HISTORY OF DVT (DEEP VEIN THROMBOSIS): ICD-10-CM

## 2024-10-23 DIAGNOSIS — D68.51 FACTOR V LEIDEN: ICD-10-CM

## 2024-10-31 ENCOUNTER — TELEPHONE (OUTPATIENT)
Dept: ONCOLOGY | Facility: CLINIC | Age: 39
End: 2024-10-31
Payer: COMMERCIAL

## 2024-10-31 NOTE — TELEPHONE ENCOUNTER
Caller: Natalia Arzate    Relationship to patient: Self    Best call back number: [phone]    Type of visit: FU    Requested date: ANY DAY, AFTERNOONS PREFERRED     If rescheduling, when is the original appointment: 11/1 CAN

## 2024-11-19 NOTE — PROGRESS NOTES
Natalia Arzate  [id number]  1985 12/4/2024      Referring Provider:   YEIMI Frausto    Reason for Follow Up:   Factor V Leiden mutation  Deep venous thrombosis (DVT)    Chief Complaint:  Factor V Leiden mutation      History of Present Illness   Natalia Arzate is a very pleasant 39 y.o.  female who presents in follow up appointment for further management and evaluation of Factor V Leiden mutation.     She was previously evaluated by Dr. De Dios in 2013 when she was diagnosed with right lower extremity DVT and bilateral pulmonary embolism. As per his clinic note that was dated on 4/25/14 her workup was positive for heterozygous Factor V Leiden and MTHFR gene mutation. Her thrombosis was felt to be provoked as she was on hormone replacement therapy at the time and Coumadin was recommended for one year. This was later changed to Xarelto by her PCP. She reports being compliant with Xarelto and developing a lower extremity DVT in 2015 at which time she was changed back to Coumadin.     However she has been without Coumadin for one year as she did not follow with her primary provider as scheduled. She presented to [facility] ED after a fall at home with complaints of fevers. She was found to have left pyelonephritis complicated by multiple abscesses as well as superinfection of left iliopsoas hematoma and was transferred to the [facility] on December 26th 2021 where she had a prolonged hospitalizaton. She developed acute respiratory failure and was intubated and also required CRRT/HD. During that admission abscesses were positive for MRSA and E. Coli and she had a left ureteral stent and drain placed which has been removed. She had a repeat CT scan that showed a decrease in size of the left RP collection on 2/7/22. She had an IVC filter placed during her admission and was initially started on heparin drip due to her hematoma and later bivalirudin due to possible heparin resistance which was  held intermittently for procedures or bleeding concerns. This was transitioned to Lovenox and ultimately to Eliquis which she is tolerating well. She also developed an acute/subacute right cerebellar ischemic stroke which was noted on 1/26/22 CT scan compared to CT scan one month prior. Neurology was consulted and recommended continuing anticoagulation. Echo was without intracardiac thrombosis. Since then she has had multiple [facility] ED visits for ongoing flank pain and UTIs and is currently following with Dr. Motley and Dr. Lord at  with whom she underwent a left nephrectomy.       Interim History:  Ms. Arzate presents today in follow up. She was on Eliquis however this was recently changed to Lovenox during her last hospitalization. She was admitted to [facility] in October 2024 with complaints of upper back pain, shortness of breath, abdominal pain and with concern for ovarian torsion. CT PE protocol that was obtained reported a filling defect within the left lower lobe anterior basal segment subsegment which was suggestive of a subsegmental pulmonary embolus.  She had been taking Eliquis twice daily and reported compliance with medication. Therefore she was started on Enoxaparin 120 mg SC twice daily. LE duplex on 10/18/24 significant for acute DVT in one of the peroneal veins repeat LE duplex on 11/10/24 was negative for DVT. No clear cause was found for her abdominal pain and she reports chronic back pain. She was also admitted in November with DKA and MARIAA.           The following portions of the patient's history were reviewed and updated as appropriate: allergies, current medications, past family history, past medical history, past social history, past surgical history and problem list.        Allergies   Allergen Reactions    Salvia Officinalis Itching, Other (See Comments) and Shortness Of Breath    Shellfish Allergy Anaphylaxis    Toradol [Ketorolac Tromethamine] Anaphylaxis and Hives    Tree Nut Anaphylaxis  and Shortness Of Breath     WALNUTS    Haldol [Haloperidol] Hives and Mental Status Change    Tramadol Hives and Swelling    Amoxicillin Hives and Rash    Penicillins Hives and Rash         Past Medical History:   Diagnosis Date    A-fib     Abnormal ECG     Anemia     Anxiety     Asthma     Cancer     Ovarian    Depression     Diabetes mellitus     DVT (deep venous thrombosis)     Factor 5 Leiden mutation, heterozygous     Fibroid     GERD (gastroesophageal reflux disease)     Gout     H/O abdominal abscess     History of sepsis     History of transfusion     Hyperlipidemia     Hypothyroid     Kidney stone     Migraines     Neuropathy     Ovarian cancer 2021    Ovarian cyst     PE (pulmonary embolism)     Polycystic ovary syndrome     Preeclampsia     Rh incompatibility     Stroke     TIA (transient ischemic attack)     Urinary tract infection     Varicella    ALVARO at [facility] secondary to ovarian torsion        Past Surgical History:   Procedure Laterality Date    ABDOMINAL SURGERY      CARDIAC CATHETERIZATION       SECTION      CHOLECYSTECTOMY      COLONOSCOPY      ENDOSCOPY      EXTRACORPOREAL SHOCK WAVE LITHOTRIPSY (ESWL) Left 10/22/2021    Procedure: EXTRACORPOREAL SHOCKWAVE LITHOTRIPSY;  Surgeon: Milan Motley MD;  Location: [facility];  Service: Urology;  Laterality: Left;    HYSTERECTOMY  2017    ovarian ca    LITHOTRIPSY      NEPHRECTOMY Left 10/2022    RIGHT OOPHORECTOMY      URETEROSCOPY LASER LITHOTRIPSY WITH STENT INSERTION Left 10/01/2021    Procedure: URETEROSCOPY WITH STENT PLACEMENT;  Surgeon: Milan Motley MD;  Location: [facility];  Service: Urology;  Laterality: Left;    URETEROSCOPY LASER LITHOTRIPSY WITH STENT INSERTION Left 2022    Procedure: URETEROSCOPY STENT REMOVAL;  Surgeon: Milan Motley MD;  Location: [facility];  Service: Urology;  Laterality: Left;    URETEROSCOPY LASER LITHOTRIPSY WITH STENT INSERTION Left 2022    Procedure: CYSTOSCOPY  "RETROGRADE LEFT WITH STENT PLACEMENT;  Surgeon: Milan Motley MD;  Location: [facility];  Service: Urology;  Laterality: Left;           Social History     Socioeconomic History    Marital status:    Tobacco Use    Smoking status: Never     Passive exposure: Never    Smokeless tobacco: Never   Vaping Use    Vaping status: Never Used   Substance and Sexual Activity    Alcohol use: No    Drug use: Never     Comment: Pt has track marks on right arm. Pt states \"It's from my INR being drawn.\"     Sexual activity: Not Currently     Partners: Male     Birth control/protection: None           Family History   Problem Relation Age of Onset    Hypertension Mother     Diabetes Father     Hypertension Father     Heart attack Father     No Known Problems Sister     No Known Problems Brother     No Known Problems Daughter     No Known Problems Son     No Known Problems Maternal Grandmother     No Known Problems Paternal Grandmother     Breast cancer Paternal Aunt     No Known Problems Maternal Grandfather     No Known Problems Paternal Grandfather     No Known Problems Cousin     Rheum arthritis Neg Hx     Osteoarthritis Neg Hx     Asthma Neg Hx     Heart failure Neg Hx     Hyperlipidemia Neg Hx     Migraines Neg Hx     Rashes / Skin problems Neg Hx     Seizures Neg Hx     Stroke Neg Hx     Thyroid disease Neg Hx    Father - PE          Current Outpatient Medications:     allopurinol (ZYLOPRIM) 100 MG tablet, Take 1 tablet by mouth Daily., Disp: 30 tablet, Rfl: 3    amitriptyline (ELAVIL) 25 MG tablet, Take 1 tablet by mouth every night at bedtime., Disp: , Rfl:     amLODIPine (NORVASC) 10 MG tablet, Take 1 tablet by mouth Daily., Disp: , Rfl:     azelastine (ASTELIN) 0.1 % nasal spray, 2 sprays both nostrils twice daily as needed, Disp: 1 each, Rfl: 3    Baclofen (LIORESAL) 5 MG tablet, Take 1 tablet by mouth Every 12 (Twelve) Hours., Disp: , Rfl:     cholecalciferol (VITAMIN D3) 1.25 MG (51474 [place]) capsule, Take " 1 capsule by mouth Every 7 (Seven) Days., Disp: , Rfl:     clopidogrel (PLAVIX) 75 MG tablet, Take 1 tablet by mouth Daily., Disp: 30 tablet, Rfl: 3    Continuous Blood Gluc Sensor (Dexcom G6 Sensor), Every 10 (Ten) Days., Disp: 9 each, Rfl: 3    cyanocobalamin ([employer] Vitamin B-12) 2000 MCG tablet, Take 1 tablet by mouth Daily., Disp: 30 tablet, Rfl: 2    DULoxetine (CYMBALTA) 30 MG capsule, Take 1 capsule by mouth 2 (Two) Times a Day., Disp: 60 capsule, Rfl: 2    Enoxaparin Sodium (LOVENOX) 100 MG/ML solution prefilled syringe syringe, Inject 1 mL under the skin into the appropriate area as directed Every 12 (Twelve) Hours., Disp: , Rfl:     fluticasone (FLONASE) 50 MCG/ACT nasal spray, Administer 2 sprays into the nostril(s) as directed by provider Daily., Disp: , Rfl:     gabapentin (NEURONTIN) 300 MG capsule, TAKE ONE CAPSULE BY MOUTH ONCE NIGHTLY AS NEEDED FOR NEUROPATHY (Patient taking differently: Take 1 capsule by mouth 3 (Three) Times a Day.), Disp: 30 capsule, Rfl: 0    glucose blood test strip, 1 each by Other route 3 (Three) Times a Day., Disp: 100 each, Rfl: 5    Insulin Pen Needle 30G X 8 MM misc, 1 Device 4 (Four) Times a Day., Disp: 200 each, Rfl: 0    Insulin Regular Human, Conc, (HumuLIN R) 500 UNIT/ML solution pen-injector CONCENTRATED injection, Inject  under the skin into the appropriate area as directed., Disp: , Rfl:     ipratropium-albuterol (DUO-NEB) 0.5-2.5 mg/3 ml nebulizer, Take 3 mL by nebulization Every 4 (Four) Hours As Needed for Shortness of Air., Disp: 150 mL, Rfl: 2    Lancets Micro Thin 33G misc, 1 Device 3 (Three) Times a Day., Disp: 100 each, Rfl: 3    lisinopril (PRINIVIL,ZESTRIL) 10 MG tablet, Take 1 tablet by mouth Daily., Disp: , Rfl:     metoprolol succinate XL (TOPROL-XL) 50 MG 24 hr tablet, Take 1 tablet by mouth Daily. (Patient taking differently: Take 1 tablet by mouth 2 (Two) Times a Day.), Disp: 30 tablet, Rfl: 0    mometasone-formoterol (DULERA 200) 200-5 MCG/ACT  inhaler, 2 puffs twice daily, Disp: 1 g, Rfl: 2    montelukast (SINGULAIR) 10 MG tablet, Take 1 tablet by mouth Every Night., Disp: 30 tablet, Rfl: 3    oxyCODONE (ROXICODONE) 10 MG tablet, Take 1 tablet by mouth., Disp: , Rfl:     pantoprazole (PROTONIX) 40 MG EC tablet, Take 1 tablet by mouth Daily., Disp: , Rfl:     promethazine (PHENERGAN) 25 MG tablet, Take 1 tablet by mouth Every 12 (Twelve) Hours As Needed for Nausea., Disp: 20 tablet, Rfl: 0    promethazine-dextromethorphan (PROMETHAZINE-DM) 6.25-15 MG/5ML syrup, Take 5 mL by mouth 4 (Four) Times a Day As Needed., Disp: , Rfl:     Respiratory Therapy Supplies (Nebulizer/Tubing/Mouthpiece) kit, 1 each Take As Directed., Disp: 1 each, Rfl: 1    rosuvastatin (CRESTOR) 20 MG tablet, Take 1 tablet by mouth Every Night. (Patient taking differently: Take 2 tablets by mouth Every Night.), Disp: 30 tablet, Rfl: 5    tamsulosin (FLOMAX) 0.4 MG capsule 24 hr capsule, Take 1 capsule by mouth Daily., Disp: 30 capsule, Rfl: 5    Tirzepatide (Mounjaro) 10 MG/0.5ML solution pen-injector pen, Inject 0.5 mL under the skin into the appropriate area as directed 1 (One) Time Per Week., Disp: 2 mL, Rfl: 2    topiramate (TOPAMAX) 25 MG tablet, Take 1 tablet by mouth Daily., Disp: 30 tablet, Rfl: 0    Ventolin  (90 Base) MCG/ACT inhaler, INHALE TWO PUFFS BY MOUTH EVERY 4 HOURS AS NEEDED FOR BREATHING, Disp: 18 g, Rfl: 3    Insulin Glargine (Lantus SoloStar) 100 UNIT/ML injection pen, Maximum daily dose of 75 units., Disp: 24 mL, Rfl: 3    Insulin Lispro, 1 Unit Dial, (HUMALOG) 100 UNIT/ML solution pen-injector, Per sliding scale, Disp: 15 mL, Rfl: 0    isosorbide mononitrate (IMDUR) 60 MG 24 hr tablet, Take 1 tablet by mouth Every Morning., Disp: 30 tablet, Rfl: 3          Review of Systems  Pertinent positives are listed as per history of present of illness, all other systems reviewed and are negative. No bleeding.           Physical Exam   Vital Signs: These were reviewed  and listed as per patient’s electronic medical chart  Vitals:    12/03/24 1446   BP: 143/89   Pulse: 102   Resp: 18   Temp: 97.3 °F (36.3 °C)   SpO2: 95%   General: Awake, alert and oriented, in no distress, in a wheelchair, elevated BMI  HEENT: Head is atraumatic, normocephalic, extraocular movements full, no scleral icterus  Neck: Supple, no JVD, lymphadenopathy or masses  Cardiovascular: Regular rate and rhythm without murmurs, rubs or gallops  Pulmonary: Nonlabored, clear to auscultation bilaterally, no wheezing  Abdomen: Soft, positive tenderness, nondistended, normal active bowel sounds present  Extremities: No clubbing, cyanosis or edema  Lymph: No cervical, supraclavicular adenopathy  Neurologic: Mental status as above, alert, awake and oriented, grossly nonfocal exam  Skin: Warm, dry, intact          Pain Score:  Pain Score    12/03/24 1446   PainSc:   7   PainLoc: Back               PHQ-Score Total:  PHQ-9 Total Score:    She denies of any homicidal or suicidal ideations. She declines [place] referral and prefers to follow with her PCP and [facility].         LABS/STUDIES:  Office Visit on 12/03/2024   Component Date Value    Glucose 12/03/2024 357 (H)     BUN 12/03/2024 20     Creatinine 12/03/2024 0.70     Sodium 12/03/2024 133 (L)     Potassium 12/03/2024 4.5     Chloride 12/03/2024 95 (L)     CO2 12/03/2024 21.1 (L)     Calcium 12/03/2024 9.6     Total Protein 12/03/2024 7.5     Albumin 12/03/2024 4.2     ALT (SGPT) 12/03/2024 28     AST (SGOT) 12/03/2024 35 (H)     Alkaline Phosphatase 12/03/2024 115     Total Bilirubin 12/03/2024 0.4     Globulin 12/03/2024 3.3     A/G Ratio 12/03/2024 1.3     BUN/Creatinine Ratio 12/03/2024 28.6 (H)     Anion Gap 12/03/2024 16.9 (H)     eGFR 12/03/2024 113.7     Protime 12/03/2024 13.4     INR 12/03/2024 1.01     PTT 12/03/2024 30.5     Iron 12/03/2024 63     Iron Saturation (TSAT) 12/03/2024 11 (L)     Transferrin 12/03/2024 385 (H)     TIBC 12/03/2024 574 (H)      Ferritin 12/03/2024 99.77     Vitamin B-12 12/03/2024 586     Folate 12/03/2024 8.05     C-Reactive Protein 12/03/2024 2.34 (H)     WBC 12/03/2024 5.61     RBC 12/03/2024 3.55 (L)     Hemoglobin 12/03/2024 10.3 (L)     Hematocrit 12/03/2024 32.3 (L)     MCV 12/03/2024 91.0     MCH 12/03/2024 29.0     MCHC 12/03/2024 31.9     RDW 12/03/2024 16.1 (H)     RDW-SD 12/03/2024 52.1     MPV 12/03/2024 9.5     Platelets 12/03/2024 202     Neutrophil % 12/03/2024 59.0     Lymphocyte % 12/03/2024 24.0     Monocyte % 12/03/2024 11.0     Eosinophil % 12/03/2024 2.0     Basophil % 12/03/2024 1.0     Bands %  12/03/2024 1.0     Myelocyte % 12/03/2024 2.0 (H)     Neutrophils Absolute 12/03/2024 3.37     Lymphocytes Absolute 12/03/2024 1.35     Monocytes Absolute 12/03/2024 0.62     Eosinophils Absolute 12/03/2024 0.11     Basophils Absolute 12/03/2024 0.06     Anisocytosis 12/03/2024 Slight/1+     Hypochromia 12/03/2024 Slight/1+     Large Platelets 12/03/2024 Slight/1+    Admission on 09/22/2024, Discharged on 09/22/2024   Component Date Value    Glucose 09/22/2024 298 (H)     BUN 09/22/2024 17     Creatinine 09/22/2024 0.64     Sodium 09/22/2024 135 (L)     Potassium 09/22/2024 4.4     Chloride 09/22/2024 101     CO2 09/22/2024 20.2 (L)     Calcium 09/22/2024 9.4     Total Protein 09/22/2024 7.4     Albumin 09/22/2024 3.8     ALT (SGPT) 09/22/2024 40 (H)     AST (SGOT) 09/22/2024 53 (H)     Alkaline Phosphatase 09/22/2024 107     Total Bilirubin 09/22/2024 0.4     Globulin 09/22/2024 3.6     A/G Ratio 09/22/2024 1.1     BUN/Creatinine Ratio 09/22/2024 26.6 (H)     Anion Gap 09/22/2024 13.8     eGFR 09/22/2024 116.2     Color, UA 09/22/2024 Yellow     Appearance, UA 09/22/2024 Clear     pH, UA 09/22/2024 5.5     Specific Gravity, UA 09/22/2024 1.023     Glucose, UA 09/22/2024 250 mg/dL (1+) (A)     Ketones, UA 09/22/2024 Trace (A)     Bilirubin, UA 09/22/2024 Negative     Blood, UA 09/22/2024 Negative     Protein, UA 09/22/2024  30 mg/dL (1+) (A)     Leuk Esterase, UA 09/22/2024 Negative     Nitrite, UA 09/22/2024 Negative     Urobilinogen, UA 09/22/2024 0.2 E.U./dL     WBC 09/22/2024 5.06     RBC 09/22/2024 3.71 (L)     Hemoglobin 09/22/2024 10.8 (L)     Hematocrit 09/22/2024 33.9 (L)     MCV 09/22/2024 91.4     MCH 09/22/2024 29.1     MCHC 09/22/2024 31.9     RDW 09/22/2024 15.2     RDW-SD 09/22/2024 50.4     MPV 09/22/2024 8.9     Platelets 09/22/2024 162     Neutrophil % 09/22/2024 64.8     Lymphocyte % 09/22/2024 23.1     Monocyte % 09/22/2024 9.7     Eosinophil % 09/22/2024 1.8     Basophil % 09/22/2024 0.2     Immature Grans % 09/22/2024 0.4     Neutrophils, Absolute 09/22/2024 3.28     Lymphocytes, Absolute 09/22/2024 1.17     Monocytes, Absolute 09/22/2024 0.49     Eosinophils, Absolute 09/22/2024 0.09     Basophils, Absolute 09/22/2024 0.01     Immature Grans, Absolute 09/22/2024 0.02     nRBC 09/22/2024 0.0     RBC, UA 09/22/2024 None Seen     WBC, UA 09/22/2024 0-2     Bacteria, UA 09/22/2024 None Seen     Squamous Epithelial Cell* 09/22/2024 0-2     Hyaline Casts, UA 09/22/2024 None Seen     Methodology 09/22/2024 Automated Microscopy    Office Visit on 07/31/2024   Component Date Value    Color 07/31/2024 Yellow     Clarity, UA 07/31/2024 Clear     Specific Gravity  07/31/2024 1.015     pH, Urine 07/31/2024 5.0     Leukocytes 07/31/2024 Negative     Nitrite, UA 07/31/2024 Negative     Protein, POC 07/31/2024 1+ (A)     Glucose, UA 07/31/2024 3+ (A)     Ketones, UA 07/31/2024 Negative     Urobilinogen, UA 07/31/2024 Normal     Bilirubin 07/31/2024 Negative     Blood, UA 07/31/2024 Negative     Lot Number 07/31/2024 [id number]     Expiration Date 07/31/2024 10/25/2024     Urine Culture 07/31/2024 25,000 CFU/mL Mixed Margo Isolated    Admission on 07/08/2024, Discharged on 07/08/2024   Component Date Value    Glucose 07/08/2024 433 (C)     BUN 07/08/2024 17     Creatinine 07/08/2024 0.72     Sodium 07/08/2024 131 (L)      Potassium 07/08/2024 4.4     Chloride 07/08/2024 94 (L)     CO2 07/08/2024 19.3 (L)     Calcium 07/08/2024 9.8     Total Protein 07/08/2024 7.5     Albumin 07/08/2024 4.2     ALT (SGPT) 07/08/2024 31     AST (SGOT) 07/08/2024 36 (H)     Alkaline Phosphatase 07/08/2024 105     Total Bilirubin 07/08/2024 0.3     Globulin 07/08/2024 3.3     A/G Ratio 07/08/2024 1.3     BUN/Creatinine Ratio 07/08/2024 23.6     Anion Gap 07/08/2024 17.7 (H)     eGFR 07/08/2024 109.9     Color, UA 07/08/2024 Yellow     Appearance, UA 07/08/2024 Clear     pH, UA 07/08/2024 5.5     Specific Gravity, UA 07/08/2024 >1.030 (H)     Glucose, UA 07/08/2024 >=1000 mg/dL (3+) (A)     Ketones, UA 07/08/2024 15 mg/dL (1+) (A)     Bilirubin, UA 07/08/2024 Negative     Blood, UA 07/08/2024 Negative     Protein, UA 07/08/2024 30 mg/dL (1+) (A)     Leuk Esterase, UA 07/08/2024 Negative     Nitrite, UA 07/08/2024 Negative     Urobilinogen, UA 07/08/2024 0.2 E.U./dL     Lipase 07/08/2024 39     C-Reactive Protein 07/08/2024 1.42 (H)     WBC 07/08/2024 5.10     RBC 07/08/2024 3.88     Hemoglobin 07/08/2024 11.6 (L)     Hematocrit 07/08/2024 34.8     MCV 07/08/2024 89.7     MCH 07/08/2024 29.9     MCHC 07/08/2024 33.3     RDW 07/08/2024 15.1     RDW-SD 07/08/2024 48.9     MPV 07/08/2024 9.2     Platelets 07/08/2024 171     Neutrophil % 07/08/2024 57.2     Lymphocyte % 07/08/2024 30.2     Monocyte % 07/08/2024 9.4     Eosinophil % 07/08/2024 2.2     Basophil % 07/08/2024 0.8     Immature Grans % 07/08/2024 0.2     Neutrophils, Absolute 07/08/2024 2.92     Lymphocytes, Absolute 07/08/2024 1.54     Monocytes, Absolute 07/08/2024 0.48     Eosinophils, Absolute 07/08/2024 0.11     Basophils, Absolute 07/08/2024 0.04     Immature Grans, Absolute 07/08/2024 0.01     nRBC 07/08/2024 0.0     Extra Tube 07/08/2024 Hold for add-ons.     Extra Tube 07/08/2024 hold for add-on     Extra Tube 07/08/2024 Hold for add-ons.     Extra Tube 07/08/2024 Hold for add-ons.      Acetone 07/08/2024 Negative     RBC, UA 07/08/2024 None Seen     WBC, UA 07/08/2024 0-2     Bacteria, UA 07/08/2024 None Seen     Squamous Epithelial Cell* 07/08/2024 0-2     Hyaline Casts, UA 07/08/2024 None Seen     Methodology 07/08/2024 Automated Microscopy     Glucose 07/08/2024 409 (C)          SCANNED LABS:                    Assessment & Plan   Natalia Arzate is a very pleasant 39 y.o.  female who presents in follow up appointment for further management and evaluation of heterozygous Factor V Leiden mutation.     Heterozygous Factor V Leiden mutation  Heterozygous [id number] MTHFR mutation  - At present she is on anticoagulation. She did have an IVC filter in place which has since been removed. Patient was previously evaluated by Dr. De Dios after she was found to have a provoked DVT and bilateral PE after hormone therapy. She underwent workup and was found to be heterozygous for the FVL mutation and heterozygous [id number] MTHFR mutation. Given the provoked nature at the time it was recommended that she continue anticoagulation for one year.   - Given her recurrent thrombosis a complete hypercoagulable workup was obtained. Homocysteine level is normal. Antithrombin panel shows an elevated activity level with normal antigen level. This is likely due to her current Eliquis use and carries no clinical significance. Protein S activity level is normal, while Protein C is elevated and likely secondary to Eliquis use. Protein C antigen is normal while S antigen studies are elevated and carries no clinical significance. Antiphospholipid studies ar negative for lupus anticoagulant and beta 2 glycoprotein and anticardiolipin antibodies are normal.   - Regardless of workup I recommended life long anticoagulation given her history of recurrent thrombosis. She also carries a history significant for atrial fibrillation and stroke and per patient Neurology also recommended ongoing anticoagulation unless there was a  contraindication or side effects. I also recommended that if she developed a thrombosis while on Eliquis I would than recommend Coumadin versus Lovenox.  - Since her last visit with me she was admitted to [facility] in October 2024 with complaints of upper back pain, shortness of breath, abdominal pain. CT PE protocol that was obtained reported a filling defect within the left lower lobe anterior basal segment subsegment which was suggestive of a subsegmental pulmonary embolus and was also found to have a lower extremity DVT. She had been taking Eliquis twice daily and reported compliance with medication, therefore she was started on enoxaparin 120 mg SC twice daily.  pharmacy recommended target anti-Xa to be 0.5 to 1.0 IU/mL.  This was found to be therapeutic as an inpatient and they had recommended to continue enoxaparin 0.75 mg/kg every 12 hours which was utilized in the setting of obesity. Therefore will call the patient to ask her when she takes enoxaparin and will obtain anti-Xa levels 4 to 6 hours after her last dose to assess whether she is within therapeutic range.  - I discussed the risks and benefits as well as the bleeding risk profile with each anticoagulation treatment (such as Warfarin, Xarelto, Eliquis) and the risk of bleeding that can occur while being on any anticoagulation. She understands that should spontaneous or abnormal bleeding occur she must seek immediate medical attention. I also advised her against high impact sports/activities while on anticoagulation that would place her at increased risk of bleeding. No NSAID use while on treatment as this can increase the risk of bleeding. I have also advised that she obtain a medical band while on anticoagulation so that this would alert paramedics, medical personal, etc that she is on anticoagulation should she ever require medical attention.    Recurrent UTIs and nephrolithiasis  - She follows with Dr. Motley as well as Dr. Lord at . She is s/p  left nephrectomy October 2022.     Normocytic anemia  - Likely secondary to bone marrow suppression from underlying inflammation (elevated ferritin and CRP) improving from hospitalization. No iron deficiency and B12 and folate levels are replete.     High Depression Screening Score  - She denies of any homicidal or suicidal ideations. She declines [place] referral and prefers to follow with her PCP and [facility] and will further discuss with them. Currently on Cymbalta.    ACO / CHERELLE/Other  Quality measures  -  Natalia Arzate  reports that she has never smoked. She has never been exposed to tobacco smoke. She has never used smokeless tobacco.  -  Natalia Arzate received 2024 flu vaccine.  -  Natalia Arzate reports a pain score of 7.  Given her pain assessment as noted, treatment options were discussed and the following options were decided upon as a follow-up plan to address the patient's pain: referral to Primary Care for assistance in pain treatment guidance.  -  Current outpatient and discharge medications have been reconciled for the patient.  Reviewed by: Alison Constantino MD      I will have the patient return for labs (CBC, iron panel, ferritin, CRP, anti-Xa level) and follow up appointment in two months. If she is doing well from a thrombosis stand point will thereafter have her follow with her PCP. She understands that should she have any questions or concerns prior to her appointment she should give us a call at any time and I would be happy to see her sooner. It was a pleasure to see this patient in clinic today, thank you for allowing me to participate in the care of this patient.     I spent 50 minutes caring for patient on this date of service. This time includes time spent by me in the following activities: preparing for the visit, reviewing tests, obtaining and/or reviewing a separately obtained history, performing a medically appropriate examination and/or evaluation, counseling and educating the  patient/family/caregiver, ordering medications, tests, or procedures, documenting information in the medical record, independently interpreting results and communicating that information with the patient/family/caregiver, and care coordination as well as answering any questions.      Alison Constantino MD   12/04/24   14:25 EST

## 2024-12-03 ENCOUNTER — LAB (OUTPATIENT)
Dept: ONCOLOGY | Facility: CLINIC | Age: 39
End: 2024-12-03
Payer: COMMERCIAL

## 2024-12-03 ENCOUNTER — OFFICE VISIT (OUTPATIENT)
Dept: ONCOLOGY | Facility: CLINIC | Age: 39
End: 2024-12-03
Payer: COMMERCIAL

## 2024-12-03 VITALS
RESPIRATION RATE: 18 BRPM | TEMPERATURE: 97.3 F | OXYGEN SATURATION: 95 % | WEIGHT: 293 LBS | SYSTOLIC BLOOD PRESSURE: 143 MMHG | BODY MASS INDEX: 50.02 KG/M2 | HEIGHT: 64 IN | DIASTOLIC BLOOD PRESSURE: 89 MMHG | HEART RATE: 102 BPM

## 2024-12-03 DIAGNOSIS — Z86.718 HISTORY OF DVT (DEEP VEIN THROMBOSIS): ICD-10-CM

## 2024-12-03 DIAGNOSIS — I26.99 LEFT PULMONARY EMBOLUS: ICD-10-CM

## 2024-12-03 DIAGNOSIS — D64.9 NORMOCYTIC ANEMIA: ICD-10-CM

## 2024-12-03 DIAGNOSIS — D68.51 FACTOR V LEIDEN: Primary | ICD-10-CM

## 2024-12-03 LAB
ALBUMIN SERPL-MCNC: 4.2 G/DL (ref 3.5–5.2)
ALBUMIN/GLOB SERPL: 1.3 G/DL
ALP SERPL-CCNC: 115 U/L (ref 39–117)
ALT SERPL W P-5'-P-CCNC: 28 U/L (ref 1–33)
ANION GAP SERPL CALCULATED.3IONS-SCNC: 16.9 MMOL/L (ref 5–15)
ANISOCYTOSIS BLD QL: ABNORMAL
APTT PPP: 30.5 SECONDS (ref 26.5–34.5)
AST SERPL-CCNC: 35 U/L (ref 1–32)
BASOPHILS # BLD MANUAL: 0.06 10*3/MM3 (ref 0–0.2)
BASOPHILS NFR BLD MANUAL: 1 % (ref 0–1.5)
BILIRUB SERPL-MCNC: 0.4 MG/DL (ref 0–1.2)
BUN SERPL-MCNC: 20 MG/DL (ref 6–20)
BUN/CREAT SERPL: 28.6 (ref 7–25)
CALCIUM SPEC-SCNC: 9.6 MG/DL (ref 8.6–10.5)
CHLORIDE SERPL-SCNC: 95 MMOL/L (ref 98–107)
CO2 SERPL-SCNC: 21.1 MMOL/L (ref 22–29)
CREAT SERPL-MCNC: 0.7 MG/DL (ref 0.57–1)
CRP SERPL-MCNC: 2.34 MG/DL (ref 0–0.5)
DEPRECATED RDW RBC AUTO: 52.1 FL (ref 37–54)
EGFRCR SERPLBLD CKD-EPI 2021: 113.7 ML/MIN/1.73
EOSINOPHIL # BLD MANUAL: 0.11 10*3/MM3 (ref 0–0.4)
EOSINOPHIL NFR BLD MANUAL: 2 % (ref 0.3–6.2)
ERYTHROCYTE [DISTWIDTH] IN BLOOD BY AUTOMATED COUNT: 16.1 % (ref 12.3–15.4)
FERRITIN SERPL-MCNC: 99.77 NG/ML (ref 13–150)
GLOBULIN UR ELPH-MCNC: 3.3 GM/DL
GLUCOSE SERPL-MCNC: 357 MG/DL (ref 65–99)
HCT VFR BLD AUTO: 32.3 % (ref 34–46.6)
HGB BLD-MCNC: 10.3 G/DL (ref 12–15.9)
HYPOCHROMIA BLD QL: ABNORMAL
INR PPP: 1.01 (ref 0.9–1.1)
IRON 24H UR-MRATE: 63 MCG/DL (ref 37–145)
IRON SATN MFR SERPL: 11 % (ref 20–50)
LARGE PLATELETS: ABNORMAL
LYMPHOCYTES # BLD MANUAL: 1.35 10*3/MM3 (ref 0.7–3.1)
LYMPHOCYTES NFR BLD MANUAL: 11 % (ref 5–12)
MCH RBC QN AUTO: 29 PG (ref 26.6–33)
MCHC RBC AUTO-ENTMCNC: 31.9 G/DL (ref 31.5–35.7)
MCV RBC AUTO: 91 FL (ref 79–97)
MONOCYTES # BLD: 0.62 10*3/MM3 (ref 0.1–0.9)
MYELOCYTES NFR BLD MANUAL: 2 % (ref 0–0)
NEUTROPHILS # BLD AUTO: 3.37 10*3/MM3 (ref 1.7–7)
NEUTROPHILS NFR BLD MANUAL: 59 % (ref 42.7–76)
NEUTS BAND NFR BLD MANUAL: 1 % (ref 0–5)
PLATELET # BLD AUTO: 202 10*3/MM3 (ref 140–450)
PMV BLD AUTO: 9.5 FL (ref 6–12)
POTASSIUM SERPL-SCNC: 4.5 MMOL/L (ref 3.5–5.2)
PROT SERPL-MCNC: 7.5 G/DL (ref 6–8.5)
PROTHROMBIN TIME: 13.4 SECONDS (ref 12.1–14.7)
RBC # BLD AUTO: 3.55 10*6/MM3 (ref 3.77–5.28)
SODIUM SERPL-SCNC: 133 MMOL/L (ref 136–145)
TIBC SERPL-MCNC: 574 MCG/DL (ref 298–536)
TRANSFERRIN SERPL-MCNC: 385 MG/DL (ref 200–360)
VARIANT LYMPHS NFR BLD MANUAL: 24 % (ref 19.6–45.3)
WBC NRBC COR # BLD AUTO: 5.61 10*3/MM3 (ref 3.4–10.8)

## 2024-12-03 PROCEDURE — 86140 C-REACTIVE PROTEIN: CPT | Performed by: INTERNAL MEDICINE

## 2024-12-03 PROCEDURE — 82728 ASSAY OF FERRITIN: CPT | Performed by: INTERNAL MEDICINE

## 2024-12-03 PROCEDURE — 85007 BL SMEAR W/DIFF WBC COUNT: CPT | Performed by: INTERNAL MEDICINE

## 2024-12-03 PROCEDURE — 84466 ASSAY OF TRANSFERRIN: CPT | Performed by: INTERNAL MEDICINE

## 2024-12-03 PROCEDURE — 85730 THROMBOPLASTIN TIME PARTIAL: CPT | Performed by: INTERNAL MEDICINE

## 2024-12-03 PROCEDURE — 85610 PROTHROMBIN TIME: CPT | Performed by: INTERNAL MEDICINE

## 2024-12-03 PROCEDURE — 85025 COMPLETE CBC W/AUTO DIFF WBC: CPT | Performed by: INTERNAL MEDICINE

## 2024-12-03 PROCEDURE — 82746 ASSAY OF FOLIC ACID SERUM: CPT | Performed by: INTERNAL MEDICINE

## 2024-12-03 PROCEDURE — 83540 ASSAY OF IRON: CPT | Performed by: INTERNAL MEDICINE

## 2024-12-03 PROCEDURE — 82607 VITAMIN B-12: CPT | Performed by: INTERNAL MEDICINE

## 2024-12-03 PROCEDURE — 80053 COMPREHEN METABOLIC PANEL: CPT | Performed by: INTERNAL MEDICINE

## 2024-12-03 RX ORDER — FLUTICASONE PROPIONATE 50 MCG
2 SPRAY, SUSPENSION (ML) NASAL DAILY
COMMUNITY
Start: 2024-07-29

## 2024-12-03 RX ORDER — DEXTROMETHORPHAN HYDROBROMIDE AND PROMETHAZINE HYDROCHLORIDE 15; 6.25 MG/5ML; MG/5ML
5 SYRUP ORAL 4 TIMES DAILY PRN
COMMUNITY
Start: 2024-11-20

## 2024-12-03 RX ORDER — AMLODIPINE BESYLATE 10 MG/1
10 TABLET ORAL DAILY
COMMUNITY
Start: 2024-11-06

## 2024-12-03 RX ORDER — OXYCODONE HYDROCHLORIDE 10 MG/1
10 TABLET ORAL
COMMUNITY
Start: 2024-08-12

## 2024-12-03 RX ORDER — BACLOFEN 5 MG/1
5 TABLET ORAL EVERY 12 HOURS SCHEDULED
COMMUNITY
Start: 2024-08-12

## 2024-12-03 RX ORDER — PANTOPRAZOLE SODIUM 40 MG/1
40 TABLET, DELAYED RELEASE ORAL DAILY
COMMUNITY
Start: 2024-06-19

## 2024-12-03 RX ORDER — ENOXAPARIN SODIUM 100 MG/ML
100 INJECTION SUBCUTANEOUS EVERY 12 HOURS SCHEDULED
COMMUNITY
Start: 2024-11-16

## 2024-12-03 NOTE — PROGRESS NOTES
Venipuncture Blood Specimen Collection  Venipuncture performed in Right hand by Alyson Gilliland MA with good hemostasis. Patient tolerated the procedure well without complications.   12/03/24   Alyson Gilliland MA

## 2024-12-04 LAB
FOLATE SERPL-MCNC: 8.05 NG/ML (ref 4.78–24.2)
VIT B12 BLD-MCNC: 586 PG/ML (ref 211–946)

## 2024-12-05 ENCOUNTER — TELEPHONE (OUTPATIENT)
Dept: ONCOLOGY | Facility: CLINIC | Age: 39
End: 2024-12-05
Payer: COMMERCIAL

## 2024-12-05 NOTE — TELEPHONE ENCOUNTER
RN attempted to call patient at personal number and at her emergency contact numbers listed, but has been unable to reach the patient at any number.    RN called 12/3, 12/4, and 12/5 to all numbers listed and have been unable to reach anyone.    RN will attempt again on 12/10 and if unable to reach patient at this time will send certified letter.

## 2024-12-13 ENCOUNTER — TELEPHONE (OUTPATIENT)
Dept: ONCOLOGY | Facility: CLINIC | Age: 39
End: 2024-12-13
Payer: COMMERCIAL

## 2024-12-13 DIAGNOSIS — D68.51 FACTOR V LEIDEN: Primary | ICD-10-CM

## 2024-12-13 DIAGNOSIS — D64.9 NORMOCYTIC ANEMIA: ICD-10-CM

## 2024-12-13 DIAGNOSIS — Z86.718 HISTORY OF DVT (DEEP VEIN THROMBOSIS): ICD-10-CM

## 2024-12-13 DIAGNOSIS — I26.99 LEFT PULMONARY EMBOLUS: ICD-10-CM

## 2024-12-13 NOTE — TELEPHONE ENCOUNTER
RN attempted to contact patient again; RN still unable to reach patient's direct phone number. However, this time was able to leave a voicemail to return our phone call on her mother's cell phone, which is her emergency contact.    If they do not return phone call to clinic, RN will send out a certified letter to reach the patient.

## 2024-12-13 NOTE — TELEPHONE ENCOUNTER
RN was finally able to speak with patient, she stated that her phone had been disconnected.  RN verified with patient the time frame in which she takes her Lovenox dosage and scheduled patient for a lab appointment within that specific 4-6 hour time frame.  RN expressed the importance of showing up for appointment on time and getting that lab drawn in a timely manner.      Natalia verbalized understanding and had no questions/concerns at this time.    RN also forwarded message to clinic MA's to express the importance of time on this as well.

## 2024-12-16 ENCOUNTER — OFFICE VISIT (OUTPATIENT)
Dept: ENDOCRINOLOGY | Facility: CLINIC | Age: 39
End: 2024-12-16
Payer: COMMERCIAL

## 2024-12-16 ENCOUNTER — CLINICAL SUPPORT (OUTPATIENT)
Dept: ONCOLOGY | Facility: CLINIC | Age: 39
End: 2024-12-16
Payer: COMMERCIAL

## 2024-12-16 VITALS
WEIGHT: 293 LBS | DIASTOLIC BLOOD PRESSURE: 79 MMHG | OXYGEN SATURATION: 94 % | HEART RATE: 95 BPM | SYSTOLIC BLOOD PRESSURE: 122 MMHG | BODY MASS INDEX: 57.67 KG/M2

## 2024-12-16 VITALS
TEMPERATURE: 98 F | HEIGHT: 64 IN | WEIGHT: 293 LBS | BODY MASS INDEX: 50.02 KG/M2 | OXYGEN SATURATION: 98 % | HEART RATE: 106 BPM | RESPIRATION RATE: 20 BRPM

## 2024-12-16 DIAGNOSIS — D64.9 NORMOCYTIC ANEMIA: ICD-10-CM

## 2024-12-16 DIAGNOSIS — Z79.4 TYPE 2 DIABETES MELLITUS WITH HYPERGLYCEMIA, WITH LONG-TERM CURRENT USE OF INSULIN: Primary | ICD-10-CM

## 2024-12-16 DIAGNOSIS — I26.99 LEFT PULMONARY EMBOLUS: ICD-10-CM

## 2024-12-16 DIAGNOSIS — D68.51 FACTOR V LEIDEN: ICD-10-CM

## 2024-12-16 DIAGNOSIS — Z86.718 HISTORY OF DVT (DEEP VEIN THROMBOSIS): ICD-10-CM

## 2024-12-16 DIAGNOSIS — E11.65 TYPE 2 DIABETES MELLITUS WITH HYPERGLYCEMIA, WITH LONG-TERM CURRENT USE OF INSULIN: Primary | ICD-10-CM

## 2024-12-16 LAB
EXPIRATION DATE: ABNORMAL
GLUCOSE BLDC GLUCOMTR-MCNC: 300 MG/DL (ref 70–130)
Lab: ABNORMAL
UFH PPP CHRO-ACNC: 0.48 IU/ML

## 2024-12-16 PROCEDURE — 83721 ASSAY OF BLOOD LIPOPROTEIN: CPT

## 2024-12-16 PROCEDURE — 85520 HEPARIN ASSAY: CPT | Performed by: INTERNAL MEDICINE

## 2024-12-16 PROCEDURE — 84443 ASSAY THYROID STIM HORMONE: CPT

## 2024-12-16 PROCEDURE — 80061 LIPID PANEL: CPT

## 2024-12-16 NOTE — PROGRESS NOTES
Venipuncture Blood Specimen Collection  Venipuncture performed in right hand by Monie Warren MA with good hemostasis. Patient tolerated the procedure well without complications.   12/16/24   Monie Warren MA

## 2024-12-16 NOTE — PROGRESS NOTES
Chief Complaint   Patient presents with    Follow-up     T2DM, last A1c 10/15/2024 (9.9)       HPI   Natalia Arzate is a 39 y.o. female who presents to the clinic today for follow-up of type 2 diabetes. She was diagnosed with gestational diabetes in 2006, then type 2 diabetes thereafter. Last A1c was 9.9 on 10/15/2024. She was recently in the hospital last month for DKA. When she was discharged her Lantus and Humalog were stopped. She was started on Humulin R U-500 insulin. She is currently taking 200 units in the morning, 150 units with lunchtime, and 180 units at dinnertime. She also takes metformin 1,000 mg twice daily and Mounjaro 2.5 mg injection weekly. She reports her primary care provider just increased her Mounjaro to 5 mg weekly injection but she has not picked this up at the pharmacy. She checks her glucose with Dexcom G6, she did not bring her  to the appointment so we are unable to look at her data. She reports her fasting glucose is averaging 200s to 300s. She denies any hypoglycemia. She is interested in starting  insulin pump therapy. She had a CVA 2 years ago and has deficits on her left side. She ambulates with a walker due to this.     Past medications: Januvia- didn't work  Diabetic complications: neuropathy, had single nephrectomy in 2022 because kidney wasn't functioning  Last optho exam: UTD  Last microalbumin: today  Last foot exam: today  Statin: yes   Lipid panel: today  ACE/ARB: yes       The following portions of the patient's history were reviewed and updated as appropriate: allergies, current medications, past family history, past medical history, past social history, past surgical history, and problem list.    /79 (BP Location: Left arm, Patient Position: Sitting, Cuff Size: Large Adult)   Pulse 95   Wt (!) 152 kg (336 lb)   LMP  (LMP Unknown) Comment: last menses 6 years ago (HCG negative today)  SpO2 94%   BMI 57.67 kg/m²    Past Medical History:   Diagnosis Date     A-fib     Abnormal ECG     Anemia     Anxiety     Asthma     Cancer     Ovarian    Depression     Diabetes mellitus     DVT (deep venous thrombosis)     Factor 5 Leiden mutation, heterozygous     Fibroid     GERD (gastroesophageal reflux disease)     Gout     H/O abdominal abscess     History of sepsis     History of transfusion     Hyperlipidemia     Hypothyroid     Kidney stone     Migraines     Neuropathy     Ovarian cancer 2021    Ovarian cyst     PE (pulmonary embolism)     Polycystic ovary syndrome     Preeclampsia     Rh incompatibility     Stroke     TIA (transient ischemic attack)     Urinary tract infection     Varicella      Past Surgical History:   Procedure Laterality Date    ABDOMINAL SURGERY      CARDIAC CATHETERIZATION       SECTION      CHOLECYSTECTOMY      COLONOSCOPY      ENDOSCOPY      EXTRACORPOREAL SHOCK WAVE LITHOTRIPSY (ESWL) Left 10/22/2021    Procedure: EXTRACORPOREAL SHOCKWAVE LITHOTRIPSY;  Surgeon: Milan Motley MD;  Location: [facility];  Service: Urology;  Laterality: Left;    HYSTERECTOMY  2017    ovarian ca    LITHOTRIPSY      NEPHRECTOMY Left 10/2022    RIGHT OOPHORECTOMY      URETEROSCOPY LASER LITHOTRIPSY WITH STENT INSERTION Left 10/01/2021    Procedure: URETEROSCOPY WITH STENT PLACEMENT;  Surgeon: Milan Motley MD;  Location: [facility] OR;  Service: Urology;  Laterality: Left;    URETEROSCOPY LASER LITHOTRIPSY WITH STENT INSERTION Left 2022    Procedure: URETEROSCOPY STENT REMOVAL;  Surgeon: Milan Motley MD;  Location: [facility] OR;  Service: Urology;  Laterality: Left;    URETEROSCOPY LASER LITHOTRIPSY WITH STENT INSERTION Left 2022    Procedure: CYSTOSCOPY RETROGRADE LEFT WITH STENT PLACEMENT;  Surgeon: Milan Motley MD;  Location: [facility] OR;  Service: Urology;  Laterality: Left;      Family History   Problem Relation Age of Onset    Hypertension Mother     Diabetes Father     Hypertension Father     Heart attack  "Father     No Known Problems Sister     No Known Problems Brother     No Known Problems Daughter     No Known Problems Son     No Known Problems Maternal Grandmother     No Known Problems Paternal Grandmother     Breast cancer Paternal Aunt     No Known Problems Maternal Grandfather     No Known Problems Paternal Grandfather     No Known Problems Cousin     Rheum arthritis Neg Hx     Osteoarthritis Neg Hx     Asthma Neg Hx     Heart failure Neg Hx     Hyperlipidemia Neg Hx     Migraines Neg Hx     Rashes / Skin problems Neg Hx     Seizures Neg Hx     Stroke Neg Hx     Thyroid disease Neg Hx       Social History     Socioeconomic History    Marital status:    Tobacco Use    Smoking status: Never     Passive exposure: Never    Smokeless tobacco: Never   Vaping Use    Vaping status: Never Used   Substance and Sexual Activity    Alcohol use: No    Drug use: Never     Comment: Pt has track marks on right arm. Pt states \"It's from my INR being drawn.\"     Sexual activity: Not Currently     Partners: Male     Birth control/protection: None      Allergies   Allergen Reactions    Salvia Officinalis Itching, Other (See Comments) and Shortness Of Breath    Shellfish Allergy Anaphylaxis    Toradol [Ketorolac Tromethamine] Anaphylaxis and Hives    Tree Nut Anaphylaxis and Shortness Of Breath     WALNUTS    Haldol [Haloperidol] Hives and Mental Status Change    Tramadol Hives and Swelling    Amoxicillin Hives and Rash    Penicillins Hives and Rash      Current Outpatient Medications on File Prior to Visit   Medication Sig Dispense Refill    allopurinol (ZYLOPRIM) 100 MG tablet Take 1 tablet by mouth Daily. 30 tablet 3    amitriptyline (ELAVIL) 25 MG tablet Take 1 tablet by mouth every night at bedtime.      amLODIPine (NORVASC) 10 MG tablet Take 1 tablet by mouth Daily.      azelastine (ASTELIN) 0.1 % nasal spray 2 sprays both nostrils twice daily as needed 1 each 3    Baclofen (LIORESAL) 5 MG tablet Take 1 tablet by " mouth Every 12 (Twelve) Hours.      cholecalciferol (VITAMIN D3) 1.25 MG (36960 UT) capsule Take 1 capsule by mouth Every 7 (Seven) Days.      clopidogrel (PLAVIX) 75 MG tablet Take 1 tablet by mouth Daily. 30 tablet 3    Continuous Blood Gluc Sensor (Dexcom G6 Sensor) Every 10 (Ten) Days. 9 each 3    cyanocobalamin ([employer] Vitamin B-12) 2000 MCG tablet Take 1 tablet by mouth Daily. 30 tablet 2    DULoxetine (CYMBALTA) 30 MG capsule Take 1 capsule by mouth 2 (Two) Times a Day. 60 capsule 2    Enoxaparin Sodium (LOVENOX) 100 MG/ML solution prefilled syringe syringe Inject 1 mL under the skin into the appropriate area as directed Every 12 (Twelve) Hours.      fluticasone (FLONASE) 50 MCG/ACT nasal spray Administer 2 sprays into the nostril(s) as directed by provider Daily.      gabapentin (NEURONTIN) 300 MG capsule TAKE ONE CAPSULE BY MOUTH ONCE NIGHTLY AS NEEDED FOR NEUROPATHY (Patient taking differently: Take 1 capsule by mouth 3 (Three) Times a Day.) 30 capsule 0    glucose blood test strip 1 each by Other route 3 (Three) Times a Day. 100 each 5    Insulin Pen Needle 30G X 8 MM misc 1 Device 4 (Four) Times a Day. 200 each 0    Insulin Regular Human, Conc, (HumuLIN R) 500 UNIT/ML solution pen-injector CONCENTRATED injection Inject  under the skin into the appropriate area as directed.      ipratropium-albuterol (DUO-NEB) 0.5-2.5 mg/3 ml nebulizer Take 3 mL by nebulization Every 4 (Four) Hours As Needed for Shortness of Air. 150 mL 2    isosorbide mononitrate (IMDUR) 60 MG 24 hr tablet Take 1 tablet by mouth Every Morning. 30 tablet 3    Lancets Micro Thin 33G misc 1 Device 3 (Three) Times a Day. 100 each 3    lisinopril (PRINIVIL,ZESTRIL) 10 MG tablet Take 1 tablet by mouth Daily.      metoprolol succinate XL (TOPROL-XL) 50 MG 24 hr tablet Take 1 tablet by mouth Daily. (Patient taking differently: Take 1 tablet by mouth 2 (Two) Times a Day.) 30 tablet 0    mometasone-formoterol (DULERA 200) 200-5 MCG/ACT inhaler 2  puffs twice daily 1 g 2    montelukast (SINGULAIR) 10 MG tablet Take 1 tablet by mouth Every Night. 30 tablet 3    oxyCODONE (ROXICODONE) 10 MG tablet Take 1 tablet by mouth.      pantoprazole (PROTONIX) 40 MG EC tablet Take 1 tablet by mouth Daily.      promethazine (PHENERGAN) 25 MG tablet Take 1 tablet by mouth Every 12 (Twelve) Hours As Needed for Nausea. 20 tablet 0    promethazine-dextromethorphan (PROMETHAZINE-DM) 6.25-15 MG/5ML syrup Take 5 mL by mouth 4 (Four) Times a Day As Needed.      Respiratory Therapy Supplies (Nebulizer/Tubing/Mouthpiece) kit 1 each Take As Directed. 1 each 1    rosuvastatin (CRESTOR) 20 MG tablet Take 1 tablet by mouth Every Night. (Patient taking differently: Take 2 tablets by mouth Every Night.) 30 tablet 5    tamsulosin (FLOMAX) 0.4 MG capsule 24 hr capsule Take 1 capsule by mouth Daily. 30 capsule 5    topiramate (TOPAMAX) 25 MG tablet Take 1 tablet by mouth Daily. 30 tablet 0    Ventolin  (90 Base) MCG/ACT inhaler INHALE TWO PUFFS BY MOUTH EVERY 4 HOURS AS NEEDED FOR BREATHING 18 g 3    [DISCONTINUED] Insulin Glargine (Lantus SoloStar) 100 UNIT/ML injection pen Maximum daily dose of 75 units. 24 mL 3    [DISCONTINUED] Insulin Lispro, 1 Unit Dial, (HUMALOG) 100 UNIT/ML solution pen-injector Per sliding scale 15 mL 0    [DISCONTINUED] Tirzepatide (Mounjaro) 10 MG/0.5ML solution pen-injector pen Inject 0.5 mL under the skin into the appropriate area as directed 1 (One) Time Per Week. 2 mL 2     No current facility-administered medications on file prior to visit.      Review of Systems   Constitutional:  Positive for fatigue. Negative for unexpected weight gain and unexpected weight loss.   Eyes:  Negative for blurred vision.   Cardiovascular:  Positive for leg swelling.   Gastrointestinal:  Negative for abdominal pain, constipation and diarrhea.   Endocrine: Positive for polydipsia and polyuria. Negative for polyphagia.   Psychiatric/Behavioral:  Negative for sleep  disturbance.       Physical Exam  Vitals reviewed.   Constitutional:       Appearance: Normal appearance.   Eyes:      Extraocular Movements: Extraocular movements intact.   Cardiovascular:      Rate and Rhythm: Normal rate.      Pulses:           Dorsalis pedis pulses are 2+ on the right side and 2+ on the left side.        Posterior tibial pulses are 2+ on the right side and 2+ on the left side.   Pulmonary:      Effort: Pulmonary effort is normal.   Feet:      Right foot:      Protective Sensation: 6 sites tested.  6 sites sensed.      Skin integrity: Dry skin present.      Toenail Condition: Right toenails are abnormally thick.      Left foot:      Protective Sensation: 6 sites tested.  3 sites sensed.      Skin integrity: Dry skin present.      Toenail Condition: Left toenails are abnormally thick.      Comments: Diabetic Foot Exam Performed and Monofilament Test Performed     Skin:     General: Skin is warm.   Neurological:      General: No focal deficit present.      Mental Status: She is alert and oriented to person, place, and time.   Psychiatric:         Mood and Affect: Mood normal.         Behavior: Behavior normal.         Thought Content: Thought content normal.         Judgment: Judgment normal.       CMP:  Lab Results   Component Value Date     (H) 05/20/2022    BUN 20 12/03/2024    CREATININE 0.70 12/03/2024    EGFR 113.7 12/03/2024    BCR 28.6 (H) 12/03/2024     (L) 12/03/2024    K 5.4 (H) 12/07/2024    CO2 21.1 (L) 12/03/2024    CALCIUM 9.6 12/03/2024    ALBUMIN 4.2 12/03/2024    AGRATIO 1.3 12/03/2024    BILITOT 0.4 12/03/2024    ALKPHOS 115 12/03/2024    AST 35 (H) 12/03/2024    ALT 28 12/03/2024     Lipid Panel:  Lab Results   Component Value Date    CHOL 260 (H) 03/28/2023    TRIG 522 (H) 03/28/2023    HDL 40 03/28/2023    VLDL 94 (H) 03/28/2023     (H) 03/28/2023     HbA1c:  Lab Results   Component Value Date    HGBA1C 9.9 (H) 10/15/2024    HGBA1C 11.0 (H) 08/04/2024     "HGBA1C 10.80 (H) 11/07/2023     Microalbumin:  No results found for: \"MALBCRERATIO\"  TSH:  Lab Results   Component Value Date    TSH 4.230 (H) 03/28/2023       Assessment and Plan  Diagnoses and all orders for this visit:    1. Type 2 diabetes mellitus with hyperglycemia, with long-term current use of insulin (Primary)  Assessment & Plan:  -Diabetes is not controlled with A1c 9.9 on 10/15/24.   -Patient will follow up in 4 weeks and bring Dexcom  so we can adjust insulin based on trends.   -She is interested in insulin pump. Discussed insulin pumps at length, she will research on her own and decide which one she would like to try and let me know at her follow up appointment.   -Continue Humulin R U-500 200 units with breakfast, 150 units with lunch and 180 units at dinnertime.  -Continue metformin 1000 mg twice daily  -Patient is scheduled to start Mounjaro 5 mg weekly injection with her PCP. Would increase this dose as tolerated due to elevated A1c.  -Discussed dietary and exercise guidelines with patient. 150 g carbs per day and 150 minutes of exercise per week. Increase protein with complex carbs. Avoid foods high in refined sugars and sugary drinks.   -Discussed the importance of yearly eye exams.  -Discussed Glucagon use and appropriate timing for this.   -S/S hypoglycemia reviewed with Rule of 15's advised.  -Discussed long term risks of untreated/undertreated diabetes including retinopathy, neuropathy, nephropathy, amputations, hospitalizations, MI, CVA.    -Patient has no personal history of pancreatitis, no family history of MEN syndrome or medullary thyroid cancer. Possible side effects including nausea, bloating, other GI upset and rarely pancreatitis were discussed. She was advised to call the office with any symptoms or concerns.      Orders:  -     POC Glucose, Blood  -     Lipid Panel  -     Microalbumin / Creatinine Urine Ratio - Urine, Clean Catch  -     TSH       Return in about 4 weeks " (around 1/13/2025). The patient was instructed to contact the clinic with any interval questions or concerns.    Please note that portions of this document were completed with a voice recognition program. Efforts were made to edit the dictations, but occasionally words are mis-transcribed.  This document has been electronically signed by YEIMI Venegas  December 16, 2024 15:50 EST

## 2024-12-16 NOTE — ASSESSMENT & PLAN NOTE
-Diabetes is not controlled with A1c 9.9 on 10/15/24.   -Patient will follow up in 4 weeks and bring Dexcom  so we can adjust insulin based on trends.   -She is interested in insulin pump. Discussed insulin pumps at length, she will research on her own and decide which one she would like to try and let me know at her follow up appointment.   -Continue Humulin R U-500 200 units with breakfast, 150 units with lunch and 180 units at dinnertime.  -Continue metformin 1000 mg twice daily  -Patient is scheduled to start Mounjaro 5 mg weekly injection with her PCP. Would increase this dose as tolerated due to elevated A1c.  -Discussed dietary and exercise guidelines with patient. 150 g carbs per day and 150 minutes of exercise per week. Increase protein with complex carbs. Avoid foods high in refined sugars and sugary drinks.   -Discussed the importance of yearly eye exams.  -Discussed Glucagon use and appropriate timing for this.   -S/S hypoglycemia reviewed with Rule of 15's advised.  -Discussed long term risks of untreated/undertreated diabetes including retinopathy, neuropathy, nephropathy, amputations, hospitalizations, MI, CVA.    -Patient has no personal history of pancreatitis, no family history of MEN syndrome or medullary thyroid cancer. Possible side effects including nausea, bloating, other GI upset and rarely pancreatitis were discussed. She was advised to call the office with any symptoms or concerns.

## 2024-12-17 ENCOUNTER — SPECIALTY PHARMACY (OUTPATIENT)
Dept: PHARMACY | Facility: HOSPITAL | Age: 39
End: 2024-12-17
Payer: COMMERCIAL

## 2024-12-17 LAB
ARTICHOKE IGE QN: 31 MG/DL (ref 0–100)
CHOLEST SERPL-MCNC: 166 MG/DL (ref 0–200)
HDLC SERPL-MCNC: 21 MG/DL (ref 40–60)
LDLC SERPL CALC-MCNC: ABNORMAL MG/DL
LDLC/HDLC SERPL: ABNORMAL {RATIO}
TRIGL SERPL-MCNC: 1163 MG/DL (ref 0–150)
TSH SERPL DL<=0.05 MIU/L-ACNC: 4.02 UIU/ML (ref 0.27–4.2)
VLDLC SERPL-MCNC: ABNORMAL MG/DL

## 2024-12-17 RX ORDER — ENOXAPARIN SODIUM 150 MG/ML
120 INJECTION SUBCUTANEOUS EVERY 12 HOURS SCHEDULED
Qty: 22.4 ML | Refills: 0 | Status: SHIPPED | OUTPATIENT
Start: 2024-12-17

## 2024-12-17 RX ORDER — FENOFIBRATE 145 MG/1
145 TABLET, COATED ORAL DAILY
Qty: 90 TABLET | Refills: 1 | Status: SHIPPED | OUTPATIENT
Start: 2024-12-17

## 2024-12-17 NOTE — PROGRESS NOTES
Called patient to discuss her most recent anti-factor Xa level, which was slightly sub-therapeutic at 0.48 units/mL despite the patient's adherence and appropriate timing of her blood draw. Her goal range is 0.5 to 1 units/mL. We discussed her enoxaparin administration schedule and the patient did confirm that she has been injecting her doses regularly at 6 AM and 6 PM. We then reviewed Dr. Constantino's most recent orders for an increased enoxaparin dose of 120 mg Q12H. This was sent to [facility] in [place] per the patient's request. Next anti-factor Xa check will be on 12/26 and patient requested an appointment in the middle of the day. I advised her that I will share this with our scheduling team and they will reach out to confirm. Patient expressed understanding.

## 2025-01-01 ENCOUNTER — APPOINTMENT (OUTPATIENT)
Dept: GENERAL RADIOLOGY | Facility: HOSPITAL | Age: 40
End: 2025-01-01
Payer: COMMERCIAL

## 2025-01-01 ENCOUNTER — HOSPITAL ENCOUNTER (EMERGENCY)
Facility: HOSPITAL | Age: 40
Discharge: OTHER INSTITUTION | End: 2025-01-02
Attending: STUDENT IN AN ORGANIZED HEALTH CARE EDUCATION/TRAINING PROGRAM
Payer: COMMERCIAL

## 2025-01-01 DIAGNOSIS — A41.9 SEPTIC SHOCK: Primary | ICD-10-CM

## 2025-01-01 DIAGNOSIS — E66.01 MORBID OBESITY: ICD-10-CM

## 2025-01-01 DIAGNOSIS — N17.9 ACUTE RENAL FAILURE, UNSPECIFIED ACUTE RENAL FAILURE TYPE: ICD-10-CM

## 2025-01-01 DIAGNOSIS — J96.02 ACUTE RESPIRATORY FAILURE WITH HYPERCAPNIA: ICD-10-CM

## 2025-01-01 DIAGNOSIS — J90 PLEURAL EFFUSION, LEFT: ICD-10-CM

## 2025-01-01 DIAGNOSIS — E87.5 HYPERKALEMIA: ICD-10-CM

## 2025-01-01 DIAGNOSIS — R65.21 SEPTIC SHOCK: Primary | ICD-10-CM

## 2025-01-01 DIAGNOSIS — T40.2X5A: ICD-10-CM

## 2025-01-01 DIAGNOSIS — E83.39 HYPERPHOSPHATEMIA: ICD-10-CM

## 2025-01-01 DIAGNOSIS — R50.9 FEVER IN ADULT: ICD-10-CM

## 2025-01-01 DIAGNOSIS — J96.00 ACUTE RESPIRATORY FAILURE, UNSPECIFIED WHETHER WITH HYPOXIA OR HYPERCAPNIA: ICD-10-CM

## 2025-01-01 LAB
A-A DO2: 383.1 MMHG (ref 0–300)
ACETONE BLD QL: NEGATIVE
ALBUMIN SERPL-MCNC: 3.5 G/DL (ref 3.5–5.2)
ALBUMIN/GLOB SERPL: 0.8 G/DL
ALP SERPL-CCNC: 129 U/L (ref 39–117)
ALT SERPL W P-5'-P-CCNC: 60 U/L (ref 1–33)
ANION GAP SERPL CALCULATED.3IONS-SCNC: 15.5 MMOL/L (ref 5–15)
ARTERIAL PATENCY WRIST A: ABNORMAL
AST SERPL-CCNC: 120 U/L (ref 1–32)
ATMOSPHERIC PRESS: 733 MMHG
ATMOSPHERIC PRESS: 733 MMHG
ATMOSPHERIC PRESS: 734 MMHG
B PARAPERT DNA SPEC QL NAA+PROBE: NOT DETECTED
B PERT DNA SPEC QL NAA+PROBE: NOT DETECTED
BASE EXCESS BLDA CALC-SCNC: -10.9 MMOL/L (ref 0–2)
BASE EXCESS BLDV CALC-SCNC: -10.1 MMOL/L (ref 0–2)
BASE EXCESS BLDV CALC-SCNC: -7.2 MMOL/L (ref 0–2)
BASOPHILS # BLD AUTO: 0.05 10*3/MM3 (ref 0–0.2)
BASOPHILS NFR BLD AUTO: 0.4 % (ref 0–1.5)
BDY SITE: ABNORMAL
BILIRUB SERPL-MCNC: 0.6 MG/DL (ref 0–1.2)
BUN SERPL-MCNC: 40 MG/DL (ref 6–20)
BUN/CREAT SERPL: 14 (ref 7–25)
C PNEUM DNA NPH QL NAA+NON-PROBE: NOT DETECTED
CALCIUM SPEC-SCNC: 8.3 MG/DL (ref 8.6–10.5)
CHLORIDE SERPL-SCNC: 98 MMOL/L (ref 98–107)
CO2 BLDA-SCNC: 22.8 MMOL/L (ref 22–33)
CO2 BLDA-SCNC: 23.1 MMOL/L (ref 22–33)
CO2 BLDA-SCNC: 25.4 MMOL/L (ref 22–33)
CO2 SERPL-SCNC: 19.5 MMOL/L (ref 22–29)
COHGB MFR BLD: 3.2 % (ref 0–5)
COHGB MFR BLD: 3.3 % (ref 0–5)
COHGB MFR BLD: 3.4 % (ref 0–5)
CREAT SERPL-MCNC: 2.85 MG/DL (ref 0.57–1)
D-LACTATE SERPL-SCNC: 2.9 MMOL/L (ref 0.5–2)
D-LACTATE SERPL-SCNC: 3.7 MMOL/L (ref 0.5–2)
DEPRECATED RDW RBC AUTO: 63.6 FL (ref 37–54)
EGFRCR SERPLBLD CKD-EPI 2021: 21.1 ML/MIN/1.73
EOSINOPHIL # BLD AUTO: 0.01 10*3/MM3 (ref 0–0.4)
EOSINOPHIL NFR BLD AUTO: 0.1 % (ref 0.3–6.2)
ERYTHROCYTE [DISTWIDTH] IN BLOOD BY AUTOMATED COUNT: 18.3 % (ref 12.3–15.4)
ERYTHROCYTE [SEDIMENTATION RATE] IN BLOOD: 111 MM/HR (ref 0–20)
FLUAV SUBTYP SPEC NAA+PROBE: NOT DETECTED
FLUBV RNA ISLT QL NAA+PROBE: NOT DETECTED
GLOBULIN UR ELPH-MCNC: 4.2 GM/DL
GLUCOSE BLDC GLUCOMTR-MCNC: 426 MG/DL (ref 70–130)
GLUCOSE BLDC GLUCOMTR-MCNC: 476 MG/DL (ref 70–130)
GLUCOSE BLDC GLUCOMTR-MCNC: 485 MG/DL (ref 70–130)
GLUCOSE SERPL-MCNC: 481 MG/DL (ref 65–99)
HADV DNA SPEC NAA+PROBE: NOT DETECTED
HBA1C MFR BLD: 9.1 % (ref 4.8–5.6)
HCO3 BLDA-SCNC: 20.4 MMOL/L (ref 20–26)
HCO3 BLDV-SCNC: 20.8 MMOL/L (ref 22–28)
HCO3 BLDV-SCNC: 23 MMOL/L (ref 22–28)
HCOV 229E RNA SPEC QL NAA+PROBE: NOT DETECTED
HCOV HKU1 RNA SPEC QL NAA+PROBE: NOT DETECTED
HCOV NL63 RNA SPEC QL NAA+PROBE: NOT DETECTED
HCOV OC43 RNA SPEC QL NAA+PROBE: NOT DETECTED
HCT VFR BLD AUTO: 31.2 % (ref 34–46.6)
HCT VFR BLD CALC: 28.9 % (ref 38–51)
HGB BLD-MCNC: 9.3 G/DL (ref 12–15.9)
HGB BLDA-MCNC: 8.5 G/DL (ref 13.5–17.5)
HGB BLDA-MCNC: 9.4 G/DL (ref 13.5–17.5)
HGB BLDA-MCNC: 9.9 G/DL (ref 13.5–17.5)
HMPV RNA NPH QL NAA+NON-PROBE: NOT DETECTED
HOLD SPECIMEN: NORMAL
HOLD SPECIMEN: NORMAL
HPIV1 RNA ISLT QL NAA+PROBE: NOT DETECTED
HPIV2 RNA SPEC QL NAA+PROBE: NOT DETECTED
HPIV3 RNA NPH QL NAA+PROBE: NOT DETECTED
HPIV4 P GENE NPH QL NAA+PROBE: NOT DETECTED
IMM GRANULOCYTES # BLD AUTO: 0.14 10*3/MM3 (ref 0–0.05)
IMM GRANULOCYTES NFR BLD AUTO: 1.1 % (ref 0–0.5)
INHALED O2 CONCENTRATION: 100 %
INHALED O2 CONCENTRATION: 80 %
INHALED O2 CONCENTRATION: 80 %
LYMPHOCYTES # BLD AUTO: 1.75 10*3/MM3 (ref 0.7–3.1)
LYMPHOCYTES NFR BLD AUTO: 13.1 % (ref 19.6–45.3)
Lab: ABNORMAL
M PNEUMO IGG SER IA-ACNC: NOT DETECTED
MAGNESIUM SERPL-MCNC: 1.5 MG/DL (ref 1.6–2.6)
MCH RBC QN AUTO: 28.9 PG (ref 26.6–33)
MCHC RBC AUTO-ENTMCNC: 29.8 G/DL (ref 31.5–35.7)
MCV RBC AUTO: 96.9 FL (ref 79–97)
METHGB BLD QL: 0.8 % (ref 0–3)
METHGB BLD QL: 0.8 % (ref 0–3)
METHGB BLD QL: <-0.1 % (ref 0–3)
MODALITY: ABNORMAL
MONOCYTES # BLD AUTO: 1.14 10*3/MM3 (ref 0.1–0.9)
MONOCYTES NFR BLD AUTO: 8.6 % (ref 5–12)
NEUTROPHILS NFR BLD AUTO: 10.23 10*3/MM3 (ref 1.7–7)
NEUTROPHILS NFR BLD AUTO: 76.7 % (ref 42.7–76)
NOTIFIED BY: ABNORMAL
NOTIFIED WHO: ABNORMAL
NRBC BLD AUTO-RTO: 0.5 /100 WBC (ref 0–0.2)
NT-PROBNP SERPL-MCNC: 5671 PG/ML (ref 0–450)
OXYHGB MFR BLDV: 69.2 % (ref 45–75)
OXYHGB MFR BLDV: 71.8 % (ref 45–75)
OXYHGB MFR BLDV: 87.6 % (ref 94–99)
PCO2 BLDA: 79.3 MM HG (ref 35–45)
PCO2 BLDV: 75.5 MM HG (ref 41–51)
PCO2 BLDV: 78.1 MM HG (ref 41–51)
PCO2 TEMP ADJ BLD: ABNORMAL MM[HG]
PEEP RESPIRATORY: 5 CM[H2O]
PH BLDA: 7.02 PH UNITS (ref 7.35–7.45)
PH BLDV: 7.05 PH UNITS (ref 7.32–7.42)
PH BLDV: 7.08 PH UNITS (ref 7.32–7.42)
PH, TEMP CORRECTED: ABNORMAL
PHOSPHATE SERPL-MCNC: 9.3 MG/DL (ref 2.5–4.5)
PLATELET # BLD AUTO: 252 10*3/MM3 (ref 140–450)
PMV BLD AUTO: 9.6 FL (ref 6–12)
PO2 BLDA: 83.6 MM HG (ref 83–108)
PO2 BLDV: 48.5 MM HG (ref 27–53)
PO2 BLDV: 54.3 MM HG (ref 27–53)
PO2 TEMP ADJ BLD: ABNORMAL MM[HG]
POTASSIUM SERPL-SCNC: 8.2 MMOL/L (ref 3.5–5.2)
PROT SERPL-MCNC: 7.7 G/DL (ref 6–8.5)
RBC # BLD AUTO: 3.22 10*6/MM3 (ref 3.77–5.28)
RHINOVIRUS RNA SPEC NAA+PROBE: NOT DETECTED
RSV RNA NPH QL NAA+NON-PROBE: NOT DETECTED
SAO2 % BLDCOA: 91.3 % (ref 94–99)
SAO2 % BLDCOV: 71.3 % (ref 45–75)
SAO2 % BLDCOV: 74.9 % (ref 45–75)
SARS-COV-2 RNA RESP QL NAA+PROBE: NOT DETECTED
SET MECH RESP RATE: 24
SODIUM SERPL-SCNC: 133 MMOL/L (ref 136–145)
VENTILATOR MODE: ABNORMAL
VT ON VENT VENT: 350 ML
VT ON VENT VENT: 350 ML
VT ON VENT VENT: 400 ML
WBC NRBC COR # BLD AUTO: 13.32 10*3/MM3 (ref 3.4–10.8)
WHOLE BLOOD HOLD COAG: NORMAL
WHOLE BLOOD HOLD SPECIMEN: NORMAL

## 2025-01-01 PROCEDURE — 25010000002 PROPOFOL 1000 MG/100ML EMULSION

## 2025-01-01 PROCEDURE — 94799 UNLISTED PULMONARY SVC/PX: CPT

## 2025-01-01 PROCEDURE — 31500 INSERT EMERGENCY AIRWAY: CPT

## 2025-01-01 PROCEDURE — 83036 HEMOGLOBIN GLYCOSYLATED A1C: CPT | Performed by: STUDENT IN AN ORGANIZED HEALTH CARE EDUCATION/TRAINING PROGRAM

## 2025-01-01 PROCEDURE — 25010000002 NALOXONE HCL 2 MG/2ML SOLUTION PREFILLED SYRINGE: Performed by: STUDENT IN AN ORGANIZED HEALTH CARE EDUCATION/TRAINING PROGRAM

## 2025-01-01 PROCEDURE — 71045 X-RAY EXAM CHEST 1 VIEW: CPT

## 2025-01-01 PROCEDURE — 36415 COLL VENOUS BLD VENIPUNCTURE: CPT

## 2025-01-01 PROCEDURE — 25010000002 MAGNESIUM SULFATE 2 GM/50ML SOLUTION: Performed by: STUDENT IN AN ORGANIZED HEALTH CARE EDUCATION/TRAINING PROGRAM

## 2025-01-01 PROCEDURE — 85652 RBC SED RATE AUTOMATED: CPT | Performed by: STUDENT IN AN ORGANIZED HEALTH CARE EDUCATION/TRAINING PROGRAM

## 2025-01-01 PROCEDURE — 85025 COMPLETE CBC W/AUTO DIFF WBC: CPT | Performed by: STUDENT IN AN ORGANIZED HEALTH CARE EDUCATION/TRAINING PROGRAM

## 2025-01-01 PROCEDURE — 81025 URINE PREGNANCY TEST: CPT | Performed by: STUDENT IN AN ORGANIZED HEALTH CARE EDUCATION/TRAINING PROGRAM

## 2025-01-01 PROCEDURE — 83605 ASSAY OF LACTIC ACID: CPT | Performed by: STUDENT IN AN ORGANIZED HEALTH CARE EDUCATION/TRAINING PROGRAM

## 2025-01-01 PROCEDURE — 87154 CUL TYP ID BLD PTHGN 6+ TRGT: CPT | Performed by: STUDENT IN AN ORGANIZED HEALTH CARE EDUCATION/TRAINING PROGRAM

## 2025-01-01 PROCEDURE — 25010000002 CEFEPIME PER 500 MG: Performed by: STUDENT IN AN ORGANIZED HEALTH CARE EDUCATION/TRAINING PROGRAM

## 2025-01-01 PROCEDURE — 25010000002 CALCIUM GLUCONATE-NACL 1-0.675 GM/50ML-% SOLUTION: Performed by: STUDENT IN AN ORGANIZED HEALTH CARE EDUCATION/TRAINING PROGRAM

## 2025-01-01 PROCEDURE — 87040 BLOOD CULTURE FOR BACTERIA: CPT | Performed by: STUDENT IN AN ORGANIZED HEALTH CARE EDUCATION/TRAINING PROGRAM

## 2025-01-01 PROCEDURE — 36600 WITHDRAWAL OF ARTERIAL BLOOD: CPT

## 2025-01-01 PROCEDURE — 71045 X-RAY EXAM CHEST 1 VIEW: CPT | Performed by: RADIOLOGY

## 2025-01-01 PROCEDURE — 25010000002 VANCOMYCIN 5 G RECONSTITUTED SOLUTION: Performed by: STUDENT IN AN ORGANIZED HEALTH CARE EDUCATION/TRAINING PROGRAM

## 2025-01-01 PROCEDURE — 80053 COMPREHEN METABOLIC PANEL: CPT | Performed by: STUDENT IN AN ORGANIZED HEALTH CARE EDUCATION/TRAINING PROGRAM

## 2025-01-01 PROCEDURE — 0202U NFCT DS 22 TRGT SARS-COV-2: CPT | Performed by: STUDENT IN AN ORGANIZED HEALTH CARE EDUCATION/TRAINING PROGRAM

## 2025-01-01 PROCEDURE — 93005 ELECTROCARDIOGRAM TRACING: CPT | Performed by: STUDENT IN AN ORGANIZED HEALTH CARE EDUCATION/TRAINING PROGRAM

## 2025-01-01 PROCEDURE — 96366 THER/PROPH/DIAG IV INF ADDON: CPT

## 2025-01-01 PROCEDURE — 87077 CULTURE AEROBIC IDENTIFY: CPT | Performed by: STUDENT IN AN ORGANIZED HEALTH CARE EDUCATION/TRAINING PROGRAM

## 2025-01-01 PROCEDURE — 96368 THER/DIAG CONCURRENT INF: CPT

## 2025-01-01 PROCEDURE — 82820 HEMOGLOBIN-OXYGEN AFFINITY: CPT

## 2025-01-01 PROCEDURE — 83930 ASSAY OF BLOOD OSMOLALITY: CPT | Performed by: STUDENT IN AN ORGANIZED HEALTH CARE EDUCATION/TRAINING PROGRAM

## 2025-01-01 PROCEDURE — 87147 CULTURE TYPE IMMUNOLOGIC: CPT | Performed by: STUDENT IN AN ORGANIZED HEALTH CARE EDUCATION/TRAINING PROGRAM

## 2025-01-01 PROCEDURE — 83735 ASSAY OF MAGNESIUM: CPT | Performed by: STUDENT IN AN ORGANIZED HEALTH CARE EDUCATION/TRAINING PROGRAM

## 2025-01-01 PROCEDURE — 51702 INSERT TEMP BLADDER CATH: CPT

## 2025-01-01 PROCEDURE — 81001 URINALYSIS AUTO W/SCOPE: CPT | Performed by: STUDENT IN AN ORGANIZED HEALTH CARE EDUCATION/TRAINING PROGRAM

## 2025-01-01 PROCEDURE — 25810000003 SODIUM CHLORIDE 0.9 % SOLUTION: Performed by: STUDENT IN AN ORGANIZED HEALTH CARE EDUCATION/TRAINING PROGRAM

## 2025-01-01 PROCEDURE — 25010000002 SUCCINYLCHOLINE PER 20 MG: Performed by: STUDENT IN AN ORGANIZED HEALTH CARE EDUCATION/TRAINING PROGRAM

## 2025-01-01 PROCEDURE — 96367 TX/PROPH/DG ADDL SEQ IV INF: CPT

## 2025-01-01 PROCEDURE — 82948 REAGENT STRIP/BLOOD GLUCOSE: CPT

## 2025-01-01 PROCEDURE — 83880 ASSAY OF NATRIURETIC PEPTIDE: CPT | Performed by: STUDENT IN AN ORGANIZED HEALTH CARE EDUCATION/TRAINING PROGRAM

## 2025-01-01 PROCEDURE — 96365 THER/PROPH/DIAG IV INF INIT: CPT

## 2025-01-01 PROCEDURE — 96375 TX/PRO/DX INJ NEW DRUG ADDON: CPT

## 2025-01-01 PROCEDURE — C1751 CATH, INF, PER/CENT/MIDLINE: HCPCS

## 2025-01-01 PROCEDURE — 99291 CRITICAL CARE FIRST HOUR: CPT

## 2025-01-01 PROCEDURE — 82375 ASSAY CARBOXYHB QUANT: CPT

## 2025-01-01 PROCEDURE — 25010000002 MIDAZOLAM 1 MG/ML 100ML NS 100 MG/100ML SOLUTION: Performed by: STUDENT IN AN ORGANIZED HEALTH CARE EDUCATION/TRAINING PROGRAM

## 2025-01-01 PROCEDURE — 94002 VENT MGMT INPAT INIT DAY: CPT

## 2025-01-01 PROCEDURE — 82805 BLOOD GASES W/O2 SATURATION: CPT

## 2025-01-01 PROCEDURE — 80307 DRUG TEST PRSMV CHEM ANLYZR: CPT | Performed by: INTERNAL MEDICINE

## 2025-01-01 PROCEDURE — 82009 KETONE BODYS QUAL: CPT | Performed by: STUDENT IN AN ORGANIZED HEALTH CARE EDUCATION/TRAINING PROGRAM

## 2025-01-01 PROCEDURE — 84100 ASSAY OF PHOSPHORUS: CPT | Performed by: STUDENT IN AN ORGANIZED HEALTH CARE EDUCATION/TRAINING PROGRAM

## 2025-01-01 PROCEDURE — 93005 ELECTROCARDIOGRAM TRACING: CPT | Performed by: INTERNAL MEDICINE

## 2025-01-01 PROCEDURE — 63710000001 INSULIN REGULAR HUMAN PER 5 UNITS: Performed by: STUDENT IN AN ORGANIZED HEALTH CARE EDUCATION/TRAINING PROGRAM

## 2025-01-01 PROCEDURE — 83050 HGB METHEMOGLOBIN QUAN: CPT

## 2025-01-01 PROCEDURE — 84145 PROCALCITONIN (PCT): CPT | Performed by: STUDENT IN AN ORGANIZED HEALTH CARE EDUCATION/TRAINING PROGRAM

## 2025-01-01 RX ORDER — CALCIUM GLUCONATE 20 MG/ML
1000 INJECTION, SOLUTION INTRAVENOUS ONCE
Status: COMPLETED | OUTPATIENT
Start: 2025-01-01 | End: 2025-01-02

## 2025-01-01 RX ORDER — DEXTROSE MONOHYDRATE, SODIUM CHLORIDE, AND POTASSIUM CHLORIDE 50; 2.98; 4.5 G/1000ML; G/1000ML; G/1000ML
125 INJECTION, SOLUTION INTRAVENOUS CONTINUOUS PRN
Status: DISCONTINUED | OUTPATIENT
Start: 2025-01-01 | End: 2025-01-01

## 2025-01-01 RX ORDER — SODIUM CHLORIDE 450 MG/100ML
200 INJECTION, SOLUTION INTRAVENOUS CONTINUOUS PRN
Status: DISCONTINUED | OUTPATIENT
Start: 2025-01-01 | End: 2025-01-02 | Stop reason: HOSPADM

## 2025-01-01 RX ORDER — SODIUM CHLORIDE AND POTASSIUM CHLORIDE 150; 900 MG/100ML; MG/100ML
200 INJECTION, SOLUTION INTRAVENOUS CONTINUOUS PRN
Status: DISCONTINUED | OUTPATIENT
Start: 2025-01-01 | End: 2025-01-01

## 2025-01-01 RX ORDER — ETOMIDATE 2 MG/ML
20 INJECTION INTRAVENOUS ONCE
Status: COMPLETED | OUTPATIENT
Start: 2025-01-01 | End: 2025-01-01

## 2025-01-01 RX ORDER — SODIUM CHLORIDE 9 MG/ML
200 INJECTION, SOLUTION INTRAVENOUS CONTINUOUS PRN
Status: DISCONTINUED | OUTPATIENT
Start: 2025-01-01 | End: 2025-01-02 | Stop reason: HOSPADM

## 2025-01-01 RX ORDER — DEXTROSE MONOHYDRATE 25 G/50ML
10-50 INJECTION, SOLUTION INTRAVENOUS
Status: DISCONTINUED | OUTPATIENT
Start: 2025-01-01 | End: 2025-01-02 | Stop reason: HOSPADM

## 2025-01-01 RX ORDER — DEXTROSE MONOHYDRATE AND SODIUM CHLORIDE 5; .9 G/100ML; G/100ML
125 INJECTION, SOLUTION INTRAVENOUS CONTINUOUS PRN
Status: DISCONTINUED | OUTPATIENT
Start: 2025-01-01 | End: 2025-01-02 | Stop reason: HOSPADM

## 2025-01-01 RX ORDER — NALOXONE HYDROCHLORIDE 1 MG/ML
2 INJECTION INTRAMUSCULAR; INTRAVENOUS; SUBCUTANEOUS ONCE
Status: COMPLETED | OUTPATIENT
Start: 2025-01-01 | End: 2025-01-01

## 2025-01-01 RX ORDER — SODIUM CHLORIDE 9 MG/ML
40 INJECTION, SOLUTION INTRAVENOUS AS NEEDED
Status: DISCONTINUED | OUTPATIENT
Start: 2025-01-01 | End: 2025-01-02 | Stop reason: HOSPADM

## 2025-01-01 RX ORDER — SODIUM CHLORIDE AND POTASSIUM CHLORIDE 300; 900 MG/100ML; MG/100ML
200 INJECTION, SOLUTION INTRAVENOUS CONTINUOUS PRN
Status: DISCONTINUED | OUTPATIENT
Start: 2025-01-01 | End: 2025-01-01

## 2025-01-01 RX ORDER — DEXTROSE MONOHYDRATE, SODIUM CHLORIDE, AND POTASSIUM CHLORIDE 50; 1.49; 4.5 G/1000ML; G/1000ML; G/1000ML
125 INJECTION, SOLUTION INTRAVENOUS CONTINUOUS PRN
Status: DISCONTINUED | OUTPATIENT
Start: 2025-01-01 | End: 2025-01-01

## 2025-01-01 RX ORDER — DEXTROSE MONOHYDRATE AND SODIUM CHLORIDE 5; .45 G/100ML; G/100ML
125 INJECTION, SOLUTION INTRAVENOUS CONTINUOUS PRN
Status: DISCONTINUED | OUTPATIENT
Start: 2025-01-01 | End: 2025-01-02 | Stop reason: HOSPADM

## 2025-01-01 RX ORDER — SODIUM POLYSTYRENE SULFONATE 4.1 MEQ/G
30 POWDER, FOR SUSPENSION ORAL; RECTAL ONCE
Status: COMPLETED | OUTPATIENT
Start: 2025-01-01 | End: 2025-01-02

## 2025-01-01 RX ORDER — LACTULOSE 10 G/15ML
30 SOLUTION ORAL ONCE
Status: COMPLETED | OUTPATIENT
Start: 2025-01-01 | End: 2025-01-02

## 2025-01-01 RX ORDER — SODIUM CHLORIDE 0.9 % (FLUSH) 0.9 %
10 SYRINGE (ML) INJECTION AS NEEDED
Status: DISCONTINUED | OUTPATIENT
Start: 2025-01-01 | End: 2025-01-02 | Stop reason: HOSPADM

## 2025-01-01 RX ORDER — SODIUM CHLORIDE AND POTASSIUM CHLORIDE 150; 450 MG/100ML; MG/100ML
200 INJECTION, SOLUTION INTRAVENOUS CONTINUOUS PRN
Status: DISCONTINUED | OUTPATIENT
Start: 2025-01-01 | End: 2025-01-01

## 2025-01-01 RX ORDER — PROPOFOL 10 MG/ML
INJECTION, EMULSION INTRAVENOUS
Status: COMPLETED
Start: 2025-01-01 | End: 2025-01-01

## 2025-01-01 RX ORDER — MIDAZOLAM IN NACL,ISO-OSMOT/PF 50 MG/50ML
1-10 INFUSION BOTTLE (ML) INTRAVENOUS
Status: DISCONTINUED | OUTPATIENT
Start: 2025-01-01 | End: 2025-01-02 | Stop reason: HOSPADM

## 2025-01-01 RX ORDER — NICOTINE POLACRILEX 4 MG
15 LOZENGE BUCCAL
Status: DISCONTINUED | OUTPATIENT
Start: 2025-01-01 | End: 2025-01-02 | Stop reason: HOSPADM

## 2025-01-01 RX ORDER — GLUCAGON 1 MG/ML
1 KIT INJECTION
Status: DISCONTINUED | OUTPATIENT
Start: 2025-01-01 | End: 2025-01-02 | Stop reason: HOSPADM

## 2025-01-01 RX ORDER — NOREPINEPHRINE BITARTRATE 0.03 MG/ML
.02-.3 INJECTION, SOLUTION INTRAVENOUS
Status: DISCONTINUED | OUTPATIENT
Start: 2025-01-01 | End: 2025-01-02 | Stop reason: HOSPADM

## 2025-01-01 RX ORDER — SUCCINYLCHOLINE CHLORIDE 20 MG/ML
100 INJECTION INTRAMUSCULAR; INTRAVENOUS ONCE
Status: COMPLETED | OUTPATIENT
Start: 2025-01-01 | End: 2025-01-01

## 2025-01-01 RX ORDER — MAGNESIUM SULFATE HEPTAHYDRATE 40 MG/ML
2 INJECTION, SOLUTION INTRAVENOUS ONCE
Status: COMPLETED | OUTPATIENT
Start: 2025-01-01 | End: 2025-01-02

## 2025-01-01 RX ORDER — SODIUM CHLORIDE 0.9 % (FLUSH) 0.9 %
10 SYRINGE (ML) INJECTION EVERY 12 HOURS SCHEDULED
Status: DISCONTINUED | OUTPATIENT
Start: 2025-01-01 | End: 2025-01-02 | Stop reason: HOSPADM

## 2025-01-01 RX ORDER — DEXTROSE MONOHYDRATE, SODIUM CHLORIDE, AND POTASSIUM CHLORIDE 50; 1.49; 9 G/1000ML; G/1000ML; G/1000ML
125 INJECTION, SOLUTION INTRAVENOUS CONTINUOUS PRN
Status: DISCONTINUED | OUTPATIENT
Start: 2025-01-01 | End: 2025-01-01

## 2025-01-01 RX ADMIN — CALCIUM GLUCONATE 1000 MG: 20 INJECTION, SOLUTION INTRAVENOUS at 22:40

## 2025-01-01 RX ADMIN — NOREPINEPHRINE BITARTRATE 0.02 MCG/KG/MIN: 0.03 INJECTION, SOLUTION INTRAVENOUS at 23:24

## 2025-01-01 RX ADMIN — PROPOFOL 15 MCG/KG/MIN: 10 INJECTION, EMULSION INTRAVENOUS at 20:45

## 2025-01-01 RX ADMIN — SODIUM CHLORIDE 1000 ML: 9 INJECTION, SOLUTION INTRAVENOUS at 22:40

## 2025-01-01 RX ADMIN — NALOXONE HYDROCHLORIDE 2 MG: 1 INJECTION PARENTERAL at 19:59

## 2025-01-01 RX ADMIN — SODIUM BICARBONATE 200 MEQ: 84 INJECTION INTRAVENOUS at 21:52

## 2025-01-01 RX ADMIN — ETOMIDATE 20 MG: 2 INJECTION, SOLUTION INTRAVENOUS at 20:00

## 2025-01-01 RX ADMIN — INSULIN HUMAN 4.3 UNITS/HR: 1 INJECTION, SOLUTION INTRAVENOUS at 22:35

## 2025-01-01 RX ADMIN — CEFEPIME 2000 MG: 2 INJECTION, POWDER, FOR SOLUTION INTRAVENOUS at 20:58

## 2025-01-01 RX ADMIN — SODIUM CHLORIDE 500 ML: 9 INJECTION, SOLUTION INTRAVENOUS at 21:27

## 2025-01-01 RX ADMIN — MAGNESIUM SULFATE HEPTAHYDRATE 2 G: 40 INJECTION, SOLUTION INTRAVENOUS at 22:49

## 2025-01-01 RX ADMIN — MIDAZOLAM HYDROCHLORIDE 1 MG/HR: 1 INJECTION, SOLUTION INTRAVENOUS at 21:37

## 2025-01-01 RX ADMIN — INSULIN HUMAN 20 UNITS: 100 INJECTION, SOLUTION PARENTERAL at 22:37

## 2025-01-01 RX ADMIN — SUCCINYLCHOLINE CHLORIDE 100 MG: 20 INJECTION, SOLUTION INTRAMUSCULAR; INTRAVENOUS at 20:02

## 2025-01-01 RX ADMIN — VANCOMYCIN HYDROCHLORIDE 2500 MG: 5 INJECTION, POWDER, LYOPHILIZED, FOR SOLUTION INTRAVENOUS at 21:02

## 2025-01-02 ENCOUNTER — APPOINTMENT (OUTPATIENT)
Dept: CT IMAGING | Facility: HOSPITAL | Age: 40
End: 2025-01-02
Payer: COMMERCIAL

## 2025-01-02 ENCOUNTER — APPOINTMENT (OUTPATIENT)
Dept: GENERAL RADIOLOGY | Facility: HOSPITAL | Age: 40
DRG: 870 | End: 2025-01-02
Payer: COMMERCIAL

## 2025-01-02 ENCOUNTER — APPOINTMENT (OUTPATIENT)
Dept: GENERAL RADIOLOGY | Facility: HOSPITAL | Age: 40
End: 2025-01-02
Payer: COMMERCIAL

## 2025-01-02 ENCOUNTER — HOSPITAL ENCOUNTER (INPATIENT)
Facility: HOSPITAL | Age: 40
LOS: 32 days | Discharge: NURSING, REHAB OR LONG-TERM CARE | DRG: 870 | End: 2025-02-03
Attending: INTERNAL MEDICINE | Admitting: INTERNAL MEDICINE
Payer: COMMERCIAL

## 2025-01-02 VITALS
HEART RATE: 97 BPM | BODY MASS INDEX: 50.02 KG/M2 | SYSTOLIC BLOOD PRESSURE: 135 MMHG | DIASTOLIC BLOOD PRESSURE: 59 MMHG | WEIGHT: 293 LBS | OXYGEN SATURATION: 100 % | HEIGHT: 64 IN | RESPIRATION RATE: 28 BRPM | TEMPERATURE: 99 F

## 2025-01-02 DIAGNOSIS — E11.40 TYPE 2 DIABETES MELLITUS WITH DIABETIC NEUROPATHY, WITH LONG-TERM CURRENT USE OF INSULIN: Chronic | ICD-10-CM

## 2025-01-02 DIAGNOSIS — R13.13 PHARYNGEAL DYSPHAGIA: ICD-10-CM

## 2025-01-02 DIAGNOSIS — Z79.4 TYPE 2 DIABETES MELLITUS WITH DIABETIC NEUROPATHY, WITH LONG-TERM CURRENT USE OF INSULIN: Chronic | ICD-10-CM

## 2025-01-02 DIAGNOSIS — E87.4: Primary | ICD-10-CM

## 2025-01-02 DIAGNOSIS — G89.29 OTHER CHRONIC PAIN: ICD-10-CM

## 2025-01-02 DIAGNOSIS — N17.9 AKI (ACUTE KIDNEY INJURY): ICD-10-CM

## 2025-01-02 DIAGNOSIS — J96.01 ACUTE RESPIRATORY FAILURE WITH HYPOXIA: ICD-10-CM

## 2025-01-02 DIAGNOSIS — E78.5 DYSLIPIDEMIA: Chronic | ICD-10-CM

## 2025-01-02 PROBLEM — E87.5 HYPERKALEMIA: Status: ACTIVE | Noted: 2025-01-02

## 2025-01-02 PROBLEM — A41.9 SEPSIS: Status: ACTIVE | Noted: 2025-01-02

## 2025-01-02 PROBLEM — J96.02 ACUTE RESPIRATORY FAILURE WITH HYPERCAPNIA: Status: ACTIVE | Noted: 2025-01-02

## 2025-01-02 PROBLEM — J96.00 ACUTE RESPIRATORY FAILURE: Status: ACTIVE | Noted: 2025-01-02

## 2025-01-02 LAB
ALBUMIN SERPL-MCNC: 3.4 G/DL (ref 3.5–5.2)
ALBUMIN SERPL-MCNC: 3.5 G/DL (ref 3.5–5.2)
ALBUMIN/GLOB SERPL: 0.8 G/DL
ALBUMIN/GLOB SERPL: 0.8 G/DL
ALP SERPL-CCNC: 137 U/L (ref 39–117)
ALP SERPL-CCNC: 142 U/L (ref 39–117)
ALT SERPL W P-5'-P-CCNC: 71 U/L (ref 1–33)
ALT SERPL W P-5'-P-CCNC: 71 U/L (ref 1–33)
AMPHET+METHAMPHET UR QL: NEGATIVE
AMPHETAMINES UR QL: NEGATIVE
ANION GAP SERPL CALCULATED.3IONS-SCNC: 12.7 MMOL/L (ref 5–15)
ANION GAP SERPL CALCULATED.3IONS-SCNC: 14 MMOL/L (ref 5–15)
ANION GAP SERPL CALCULATED.3IONS-SCNC: 14.4 MMOL/L (ref 5–15)
ANION GAP SERPL CALCULATED.3IONS-SCNC: 15 MMOL/L (ref 5–15)
ANION GAP SERPL CALCULATED.3IONS-SCNC: 15 MMOL/L (ref 5–15)
ANION GAP SERPL CALCULATED.3IONS-SCNC: 15.5 MMOL/L (ref 5–15)
ANION GAP SERPL CALCULATED.3IONS-SCNC: 16.6 MMOL/L (ref 5–15)
APTT PPP: 35.1 SECONDS (ref 60–90)
ARTERIAL PATENCY WRIST A: ABNORMAL
AST SERPL-CCNC: 119 U/L (ref 1–32)
AST SERPL-CCNC: 136 U/L (ref 1–32)
ATMOSPHERIC PRESS: 733 MMHG
ATMOSPHERIC PRESS: ABNORMAL MM[HG]
B-HCG UR QL: NEGATIVE
BACTERIA BLD CULT: NORMAL
BACTERIA UR QL AUTO: ABNORMAL /HPF
BACTERIA UR QL AUTO: ABNORMAL /HPF
BARBITURATES UR QL SCN: NEGATIVE
BASE EXCESS BLDA CALC-SCNC: -0.6 MMOL/L (ref 0–2)
BASE EXCESS BLDA CALC-SCNC: -3.4 MMOL/L (ref 0–2)
BASE EXCESS BLDA CALC-SCNC: -4.4 MMOL/L (ref 0–2)
BASE EXCESS BLDV CALC-SCNC: -5.1 MMOL/L (ref 0–2)
BASOPHILS # BLD AUTO: 0.03 10*3/MM3 (ref 0–0.2)
BASOPHILS # BLD AUTO: 0.04 10*3/MM3 (ref 0–0.2)
BASOPHILS NFR BLD AUTO: 0.3 % (ref 0–1.5)
BASOPHILS NFR BLD AUTO: 0.4 % (ref 0–1.5)
BDY SITE: ABNORMAL
BENZODIAZ UR QL SCN: NEGATIVE
BILIRUB SERPL-MCNC: 0.3 MG/DL (ref 0–1.2)
BILIRUB SERPL-MCNC: 0.4 MG/DL (ref 0–1.2)
BILIRUB UR QL STRIP: NEGATIVE
BILIRUB UR QL STRIP: NEGATIVE
BODY TEMPERATURE: 37
BOTTLE TYPE: NORMAL
BUN SERPL-MCNC: 34 MG/DL (ref 6–20)
BUN SERPL-MCNC: 37 MG/DL (ref 6–20)
BUN SERPL-MCNC: 39 MG/DL (ref 6–20)
BUN SERPL-MCNC: 40 MG/DL (ref 6–20)
BUN SERPL-MCNC: 40 MG/DL (ref 6–20)
BUN SERPL-MCNC: 41 MG/DL (ref 6–20)
BUN SERPL-MCNC: 41 MG/DL (ref 6–20)
BUN/CREAT SERPL: 13.6 (ref 7–25)
BUN/CREAT SERPL: 13.9 (ref 7–25)
BUN/CREAT SERPL: 16.4 (ref 7–25)
BUN/CREAT SERPL: 16.7 (ref 7–25)
BUN/CREAT SERPL: 23 (ref 7–25)
BUN/CREAT SERPL: 23.1 (ref 7–25)
BUN/CREAT SERPL: 24.6 (ref 7–25)
BUPRENORPHINE SERPL-MCNC: NEGATIVE NG/ML
CALCIUM SPEC-SCNC: 8.1 MG/DL (ref 8.6–10.5)
CALCIUM SPEC-SCNC: 8.2 MG/DL (ref 8.6–10.5)
CALCIUM SPEC-SCNC: 8.4 MG/DL (ref 8.6–10.5)
CALCIUM SPEC-SCNC: 8.4 MG/DL (ref 8.6–10.5)
CALCIUM SPEC-SCNC: 8.7 MG/DL (ref 8.6–10.5)
CALCIUM SPEC-SCNC: 8.7 MG/DL (ref 8.6–10.5)
CALCIUM SPEC-SCNC: 8.8 MG/DL (ref 8.6–10.5)
CANNABINOIDS SERPL QL: NEGATIVE
CHLORIDE SERPL-SCNC: 100 MMOL/L (ref 98–107)
CHLORIDE SERPL-SCNC: 101 MMOL/L (ref 98–107)
CHLORIDE SERPL-SCNC: 101 MMOL/L (ref 98–107)
CHLORIDE SERPL-SCNC: 98 MMOL/L (ref 98–107)
CHLORIDE SERPL-SCNC: 99 MMOL/L (ref 98–107)
CK SERPL-CCNC: 211 U/L (ref 20–180)
CLARITY UR: ABNORMAL
CLARITY UR: ABNORMAL
CO2 BLDA-SCNC: 25.8 MMOL/L (ref 22–33)
CO2 BLDA-SCNC: 26.3 MMOL/L (ref 22–33)
CO2 BLDA-SCNC: 26.7 MMOL/L (ref 22–33)
CO2 BLDA-SCNC: 27.6 MMOL/L (ref 22–33)
CO2 SERPL-SCNC: 19.5 MMOL/L (ref 22–29)
CO2 SERPL-SCNC: 20.4 MMOL/L (ref 22–29)
CO2 SERPL-SCNC: 21 MMOL/L (ref 22–29)
CO2 SERPL-SCNC: 22 MMOL/L (ref 22–29)
CO2 SERPL-SCNC: 22 MMOL/L (ref 22–29)
CO2 SERPL-SCNC: 22.3 MMOL/L (ref 22–29)
CO2 SERPL-SCNC: 22.6 MMOL/L (ref 22–29)
COARSE GRAN CASTS URNS QL MICRO: ABNORMAL /LPF
COCAINE UR QL: NEGATIVE
COHGB MFR BLD: 1.7 % (ref 0–2)
COHGB MFR BLD: 1.8 % (ref 0–2)
COHGB MFR BLD: 1.8 % (ref 0–2)
COHGB MFR BLD: 1.9 % (ref 0–5)
COLOR UR: ABNORMAL
COLOR UR: YELLOW
CREAT SERPL-MCNC: 1.38 MG/DL (ref 0.57–1)
CREAT SERPL-MCNC: 1.61 MG/DL (ref 0.57–1)
CREAT SERPL-MCNC: 1.69 MG/DL (ref 0.57–1)
CREAT SERPL-MCNC: 2.39 MG/DL (ref 0.57–1)
CREAT SERPL-MCNC: 2.5 MG/DL (ref 0.57–1)
CREAT SERPL-MCNC: 2.88 MG/DL (ref 0.57–1)
CREAT SERPL-MCNC: 3.02 MG/DL (ref 0.57–1)
CREAT UR-MCNC: 52.3 MG/DL
D-LACTATE SERPL-SCNC: 2.4 MMOL/L (ref 0.5–2)
D-LACTATE SERPL-SCNC: 2.7 MMOL/L (ref 0.5–2)
D-LACTATE SERPL-SCNC: 2.8 MMOL/L (ref 0.5–2)
D-LACTATE SERPL-SCNC: 3 MMOL/L (ref 0.5–2)
DEPRECATED RDW RBC AUTO: 61.8 FL (ref 37–54)
DEPRECATED RDW RBC AUTO: 63.7 FL (ref 37–54)
EGFRCR SERPLBLD CKD-EPI 2021: 19.7 ML/MIN/1.73
EGFRCR SERPLBLD CKD-EPI 2021: 20.8 ML/MIN/1.73
EGFRCR SERPLBLD CKD-EPI 2021: 24.7 ML/MIN/1.73
EGFRCR SERPLBLD CKD-EPI 2021: 26 ML/MIN/1.73
EGFRCR SERPLBLD CKD-EPI 2021: 39.5 ML/MIN/1.73
EGFRCR SERPLBLD CKD-EPI 2021: 41.8 ML/MIN/1.73
EGFRCR SERPLBLD CKD-EPI 2021: 50.3 ML/MIN/1.73
EOSINOPHIL # BLD AUTO: 0.01 10*3/MM3 (ref 0–0.4)
EOSINOPHIL # BLD AUTO: 0.01 10*3/MM3 (ref 0–0.4)
EOSINOPHIL NFR BLD AUTO: 0.1 % (ref 0.3–6.2)
EOSINOPHIL NFR BLD AUTO: 0.1 % (ref 0.3–6.2)
EOSINOPHIL SPEC QL MICRO: 0 % EOS/100 CELLS (ref 0–0)
EPAP: 0
ERYTHROCYTE [DISTWIDTH] IN BLOOD BY AUTOMATED COUNT: 18 % (ref 12.3–15.4)
ERYTHROCYTE [DISTWIDTH] IN BLOOD BY AUTOMATED COUNT: 18 % (ref 12.3–15.4)
FENTANYL UR-MCNC: NEGATIVE NG/ML
GEN 5 1HR TROPONIN T REFLEX: 49 NG/L
GLOBULIN UR ELPH-MCNC: 4.1 GM/DL
GLOBULIN UR ELPH-MCNC: 4.5 GM/DL
GLUCOSE BLDC GLUCOMTR-MCNC: 190 MG/DL (ref 70–130)
GLUCOSE BLDC GLUCOMTR-MCNC: 211 MG/DL (ref 70–130)
GLUCOSE BLDC GLUCOMTR-MCNC: 248 MG/DL (ref 70–130)
GLUCOSE BLDC GLUCOMTR-MCNC: 255 MG/DL (ref 70–130)
GLUCOSE BLDC GLUCOMTR-MCNC: 281 MG/DL (ref 70–130)
GLUCOSE BLDC GLUCOMTR-MCNC: 286 MG/DL (ref 70–130)
GLUCOSE BLDC GLUCOMTR-MCNC: 286 MG/DL (ref 70–130)
GLUCOSE BLDC GLUCOMTR-MCNC: 298 MG/DL (ref 70–130)
GLUCOSE BLDC GLUCOMTR-MCNC: 315 MG/DL (ref 70–130)
GLUCOSE BLDC GLUCOMTR-MCNC: 322 MG/DL (ref 70–130)
GLUCOSE BLDC GLUCOMTR-MCNC: 337 MG/DL (ref 70–130)
GLUCOSE BLDC GLUCOMTR-MCNC: 340 MG/DL (ref 70–130)
GLUCOSE BLDC GLUCOMTR-MCNC: 342 MG/DL (ref 70–130)
GLUCOSE BLDC GLUCOMTR-MCNC: 343 MG/DL (ref 70–130)
GLUCOSE BLDC GLUCOMTR-MCNC: 353 MG/DL (ref 70–130)
GLUCOSE BLDC GLUCOMTR-MCNC: 384 MG/DL (ref 70–130)
GLUCOSE BLDC GLUCOMTR-MCNC: 384 MG/DL (ref 70–130)
GLUCOSE BLDC GLUCOMTR-MCNC: 387 MG/DL (ref 70–130)
GLUCOSE BLDC GLUCOMTR-MCNC: 396 MG/DL (ref 70–130)
GLUCOSE BLDC GLUCOMTR-MCNC: 406 MG/DL (ref 70–130)
GLUCOSE BLDC GLUCOMTR-MCNC: 410 MG/DL (ref 70–130)
GLUCOSE BLDC GLUCOMTR-MCNC: 515 MG/DL (ref 70–130)
GLUCOSE SERPL-MCNC: 234 MG/DL (ref 65–99)
GLUCOSE SERPL-MCNC: 314 MG/DL (ref 65–99)
GLUCOSE SERPL-MCNC: 315 MG/DL (ref 65–99)
GLUCOSE SERPL-MCNC: 317 MG/DL (ref 65–99)
GLUCOSE SERPL-MCNC: 373 MG/DL (ref 65–99)
GLUCOSE SERPL-MCNC: 377 MG/DL (ref 65–99)
GLUCOSE SERPL-MCNC: 413 MG/DL (ref 65–99)
GLUCOSE UR STRIP-MCNC: ABNORMAL MG/DL
GLUCOSE UR STRIP-MCNC: ABNORMAL MG/DL
HCO3 BLDA-SCNC: 23.9 MMOL/L (ref 20–26)
HCO3 BLDA-SCNC: 24.5 MMOL/L (ref 20–26)
HCO3 BLDA-SCNC: 26 MMOL/L (ref 20–26)
HCO3 BLDV-SCNC: 24.5 MMOL/L (ref 22–28)
HCT VFR BLD AUTO: 28 % (ref 34–46.6)
HCT VFR BLD AUTO: 28.1 % (ref 34–46.6)
HCT VFR BLD CALC: 25.2 % (ref 38–51)
HCT VFR BLD CALC: 25.6 % (ref 38–51)
HCT VFR BLD CALC: 26.3 % (ref 38–51)
HGB BLD-MCNC: 8.4 G/DL (ref 12–15.9)
HGB BLD-MCNC: 8.5 G/DL (ref 12–15.9)
HGB BLDA-MCNC: 8.2 G/DL (ref 14–18)
HGB BLDA-MCNC: 8.4 G/DL (ref 14–18)
HGB BLDA-MCNC: 8.6 G/DL (ref 14–18)
HGB BLDA-MCNC: 9.2 G/DL (ref 13.5–17.5)
HGB UR QL STRIP.AUTO: ABNORMAL
HGB UR QL STRIP.AUTO: ABNORMAL
HYALINE CASTS UR QL AUTO: ABNORMAL /LPF
HYALINE CASTS UR QL AUTO: ABNORMAL /LPF
IMM GRANULOCYTES # BLD AUTO: 0.08 10*3/MM3 (ref 0–0.05)
IMM GRANULOCYTES # BLD AUTO: 0.09 10*3/MM3 (ref 0–0.05)
IMM GRANULOCYTES NFR BLD AUTO: 0.8 % (ref 0–0.5)
IMM GRANULOCYTES NFR BLD AUTO: 0.9 % (ref 0–0.5)
INHALED O2 CONCENTRATION: 100 %
INHALED O2 CONCENTRATION: 100 %
INHALED O2 CONCENTRATION: 65 %
INHALED O2 CONCENTRATION: 75 %
INR PPP: 1.15 (ref 0.89–1.12)
IPAP: 0
KETONES UR QL STRIP: ABNORMAL
KETONES UR QL STRIP: NEGATIVE
L PNEUMO1 AG UR QL IA: NEGATIVE
LDH SERPL-CCNC: 224 U/L (ref 135–214)
LEUKOCYTE ESTERASE UR QL STRIP.AUTO: NEGATIVE
LEUKOCYTE ESTERASE UR QL STRIP.AUTO: NEGATIVE
LYMPHOCYTES # BLD AUTO: 0.66 10*3/MM3 (ref 0.7–3.1)
LYMPHOCYTES # BLD AUTO: 0.88 10*3/MM3 (ref 0.7–3.1)
LYMPHOCYTES NFR BLD AUTO: 6.8 % (ref 19.6–45.3)
LYMPHOCYTES NFR BLD AUTO: 8.4 % (ref 19.6–45.3)
Lab: ABNORMAL
MAGNESIUM SERPL-MCNC: 1.7 MG/DL (ref 1.6–2.6)
MAGNESIUM SERPL-MCNC: 1.8 MG/DL (ref 1.6–2.6)
MAGNESIUM SERPL-MCNC: 2 MG/DL (ref 1.6–2.6)
MCH RBC QN AUTO: 29 PG (ref 26.6–33)
MCH RBC QN AUTO: 29.2 PG (ref 26.6–33)
MCHC RBC AUTO-ENTMCNC: 29.9 G/DL (ref 31.5–35.7)
MCHC RBC AUTO-ENTMCNC: 30.4 G/DL (ref 31.5–35.7)
MCV RBC AUTO: 95.6 FL (ref 79–97)
MCV RBC AUTO: 97.6 FL (ref 79–97)
METHADONE UR QL SCN: NEGATIVE
METHGB BLD QL: 0.3 % (ref 0–1.5)
METHGB BLD QL: 0.4 % (ref 0–1.5)
METHGB BLD QL: 0.4 % (ref 0–1.5)
METHGB BLD QL: 0.9 % (ref 0–3)
MODALITY: ABNORMAL
MONOCYTES # BLD AUTO: 0.83 10*3/MM3 (ref 0.1–0.9)
MONOCYTES # BLD AUTO: 0.91 10*3/MM3 (ref 0.1–0.9)
MONOCYTES NFR BLD AUTO: 8.5 % (ref 5–12)
MONOCYTES NFR BLD AUTO: 8.7 % (ref 5–12)
MRSA DNA SPEC QL NAA+PROBE: POSITIVE
NEUTROPHILS NFR BLD AUTO: 8.1 10*3/MM3 (ref 1.7–7)
NEUTROPHILS NFR BLD AUTO: 8.55 10*3/MM3 (ref 1.7–7)
NEUTROPHILS NFR BLD AUTO: 81.5 % (ref 42.7–76)
NEUTROPHILS NFR BLD AUTO: 83.5 % (ref 42.7–76)
NITRITE UR QL STRIP: NEGATIVE
NITRITE UR QL STRIP: NEGATIVE
NOTIFIED BY: ABNORMAL
NOTIFIED BY: ABNORMAL
NOTIFIED WHO: ABNORMAL
NOTIFIED WHO: ABNORMAL
NRBC BLD AUTO-RTO: 0.2 /100 WBC (ref 0–0.2)
NRBC BLD AUTO-RTO: 0.3 /100 WBC (ref 0–0.2)
NT-PROBNP SERPL-MCNC: 6035 PG/ML (ref 0–450)
OPIATES UR QL: POSITIVE
OSMOLALITY SERPL: 329 MOSM/KG (ref 275–300)
OXYCODONE UR QL SCN: POSITIVE
OXYHGB MFR BLDV: 76.3 % (ref 45–75)
OXYHGB MFR BLDV: 95.7 % (ref 94–99)
OXYHGB MFR BLDV: 95.8 % (ref 94–99)
OXYHGB MFR BLDV: 98.2 % (ref 94–99)
PAW @ PEAK INSP FLOW SETTING VENT: 0 CMH2O
PAW @ PEAK INSP FLOW SETTING VENT: 0 CMH2O
PAW @ PEAK INSP FLOW SETTING VENT: 28 CMH2O
PCO2 BLDA: 52.6 MM HG (ref 35–45)
PCO2 BLDA: 59.8 MM HG (ref 35–45)
PCO2 BLDA: 62.4 MM HG (ref 35–45)
PCO2 BLDV: 72.9 MM HG (ref 41–51)
PCO2 TEMP ADJ BLD: 52.6 MM HG (ref 35–45)
PCO2 TEMP ADJ BLD: 59.8 MM HG (ref 35–45)
PCO2 TEMP ADJ BLD: 62.4 MM HG (ref 35–45)
PCP UR QL SCN: NEGATIVE
PEEP RESPIRATORY: 14 CM[H2O]
PH BLDA: 7.19 PH UNITS (ref 7.35–7.45)
PH BLDA: 7.22 PH UNITS (ref 7.35–7.45)
PH BLDA: 7.3 PH UNITS (ref 7.35–7.45)
PH BLDV: 7.13 PH UNITS (ref 7.32–7.42)
PH UR STRIP.AUTO: 5.5 [PH] (ref 5–8)
PH UR STRIP.AUTO: <=5 [PH] (ref 5–8)
PH, TEMP CORRECTED: 7.19 PH UNITS
PH, TEMP CORRECTED: 7.22 PH UNITS
PH, TEMP CORRECTED: 7.3 PH UNITS
PHOSPHATE SERPL-MCNC: 4.5 MG/DL (ref 2.5–4.5)
PHOSPHATE SERPL-MCNC: 5.3 MG/DL (ref 2.5–4.5)
PHOSPHATE SERPL-MCNC: 8.6 MG/DL (ref 2.5–4.5)
PLATELET # BLD AUTO: 222 10*3/MM3 (ref 140–450)
PLATELET # BLD AUTO: 232 10*3/MM3 (ref 140–450)
PMV BLD AUTO: 9.2 FL (ref 6–12)
PMV BLD AUTO: 9.3 FL (ref 6–12)
PO2 BLDA: 190 MM HG (ref 83–108)
PO2 BLDA: 92.5 MM HG (ref 83–108)
PO2 BLDA: 98.1 MM HG (ref 83–108)
PO2 BLDV: 52.8 MM HG (ref 27–53)
PO2 TEMP ADJ BLD: 190 MM HG (ref 83–108)
PO2 TEMP ADJ BLD: 92.5 MM HG (ref 83–108)
PO2 TEMP ADJ BLD: 98.1 MM HG (ref 83–108)
POTASSIUM SERPL-SCNC: 4.7 MMOL/L (ref 3.5–5.2)
POTASSIUM SERPL-SCNC: 5.7 MMOL/L (ref 3.5–5.2)
POTASSIUM SERPL-SCNC: 6.1 MMOL/L (ref 3.5–5.2)
POTASSIUM SERPL-SCNC: 6.1 MMOL/L (ref 3.5–5.2)
POTASSIUM SERPL-SCNC: 6.6 MMOL/L (ref 3.5–5.2)
POTASSIUM SERPL-SCNC: 7 MMOL/L (ref 3.5–5.2)
POTASSIUM SERPL-SCNC: 7.2 MMOL/L (ref 3.5–5.2)
PROCALCITONIN SERPL-MCNC: 3.51 NG/ML (ref 0–0.25)
PROCALCITONIN SERPL-MCNC: 6.26 NG/ML (ref 0–0.25)
PROT ?TM UR-MCNC: 59.2 MG/DL
PROT SERPL-MCNC: 7.5 G/DL (ref 6–8.5)
PROT SERPL-MCNC: 8 G/DL (ref 6–8.5)
PROT UR QL STRIP: ABNORMAL
PROT UR QL STRIP: ABNORMAL
PROTHROMBIN TIME: 14.8 SECONDS (ref 12.2–14.5)
QT INTERVAL: 368 MS
QT INTERVAL: 386 MS
QT INTERVAL: 394 MS
QTC INTERVAL: 469 MS
QTC INTERVAL: 485 MS
QTC INTERVAL: 492 MS
RBC # BLD AUTO: 2.88 10*6/MM3 (ref 3.77–5.28)
RBC # BLD AUTO: 2.93 10*6/MM3 (ref 3.77–5.28)
RBC # UR STRIP: ABNORMAL /HPF
RBC # UR STRIP: ABNORMAL /HPF
REF LAB TEST METHOD: ABNORMAL
REF LAB TEST METHOD: ABNORMAL
S PNEUM AG SPEC QL LA: NEGATIVE
SAO2 % BLDCOV: 78.5 % (ref 45–75)
SET MECH RESP RATE: 26
SODIUM SERPL-SCNC: 134 MMOL/L (ref 136–145)
SODIUM SERPL-SCNC: 136 MMOL/L (ref 136–145)
SODIUM SERPL-SCNC: 137 MMOL/L (ref 136–145)
SODIUM SERPL-SCNC: 137 MMOL/L (ref 136–145)
SODIUM UR-SCNC: 115 MMOL/L
SP GR UR STRIP: 1.02 (ref 1–1.03)
SP GR UR STRIP: >1.03 (ref 1–1.03)
SQUAMOUS #/AREA URNS HPF: ABNORMAL /HPF
SQUAMOUS #/AREA URNS HPF: ABNORMAL /HPF
TOTAL RATE: 0 BREATHS/MINUTE
TOTAL RATE: 0 BREATHS/MINUTE
TOTAL RATE: 22 BREATHS/MINUTE
TRICYCLICS UR QL SCN: POSITIVE
TROPONIN T % DELTA: -6 %
TROPONIN T NUMERIC DELTA: -3 NG/L
TROPONIN T SERPL HS-MCNC: 52 NG/L
UFH PPP CHRO-ACNC: 0.1 IU/ML (ref 0.3–0.7)
UFH PPP CHRO-ACNC: 0.1 IU/ML (ref 0.3–0.7)
URATE SERPL-MCNC: 7.6 MG/DL (ref 2.4–5.7)
UROBILINOGEN UR QL STRIP: ABNORMAL
UROBILINOGEN UR QL STRIP: ABNORMAL
UUN 24H UR-MCNC: 413 MG/DL
VANCOMYCIN SERPL-MCNC: 13.9 MCG/ML (ref 5–40)
VENTILATOR MODE: ABNORMAL
VT ON VENT VENT: 0.39 ML
VT ON VENT VENT: 0.39 ML
VT ON VENT VENT: 400 ML
WBC # UR STRIP: ABNORMAL /HPF
WBC # UR STRIP: ABNORMAL /HPF
WBC NRBC COR # BLD AUTO: 10.48 10*3/MM3 (ref 3.4–10.8)
WBC NRBC COR # BLD AUTO: 9.71 10*3/MM3 (ref 3.4–10.8)

## 2025-01-02 PROCEDURE — 82805 BLOOD GASES W/O2 SATURATION: CPT

## 2025-01-02 PROCEDURE — 94799 UNLISTED PULMONARY SVC/PX: CPT

## 2025-01-02 PROCEDURE — 80202 ASSAY OF VANCOMYCIN: CPT

## 2025-01-02 PROCEDURE — 70450 CT HEAD/BRAIN W/O DYE: CPT | Performed by: RADIOLOGY

## 2025-01-02 PROCEDURE — 82948 REAGENT STRIP/BLOOD GLUCOSE: CPT

## 2025-01-02 PROCEDURE — 96375 TX/PRO/DX INJ NEW DRUG ADDON: CPT

## 2025-01-02 PROCEDURE — 25010000002 ALBUMIN HUMAN 25% PER 50 ML: Performed by: INTERNAL MEDICINE

## 2025-01-02 PROCEDURE — 84100 ASSAY OF PHOSPHORUS: CPT | Performed by: STUDENT IN AN ORGANIZED HEALTH CARE EDUCATION/TRAINING PROGRAM

## 2025-01-02 PROCEDURE — 87205 SMEAR GRAM STAIN: CPT

## 2025-01-02 PROCEDURE — 84484 ASSAY OF TROPONIN QUANT: CPT | Performed by: STUDENT IN AN ORGANIZED HEALTH CARE EDUCATION/TRAINING PROGRAM

## 2025-01-02 PROCEDURE — 84540 ASSAY OF URINE/UREA-N: CPT | Performed by: INTERNAL MEDICINE

## 2025-01-02 PROCEDURE — 25010000002 VANCOMYCIN 1 G RECONSTITUTED SOLUTION 1 EACH VIAL

## 2025-01-02 PROCEDURE — 82010 KETONE BODYS QUAN: CPT

## 2025-01-02 PROCEDURE — 83050 HGB METHEMOGLOBIN QUAN: CPT

## 2025-01-02 PROCEDURE — 87449 NOS EACH ORGANISM AG IA: CPT

## 2025-01-02 PROCEDURE — 93005 ELECTROCARDIOGRAM TRACING: CPT | Performed by: INTERNAL MEDICINE

## 2025-01-02 PROCEDURE — 25010000002 HEPARIN (PORCINE) PER 1000 UNITS

## 2025-01-02 PROCEDURE — 93010 ELECTROCARDIOGRAM REPORT: CPT | Performed by: STUDENT IN AN ORGANIZED HEALTH CARE EDUCATION/TRAINING PROGRAM

## 2025-01-02 PROCEDURE — 85730 THROMBOPLASTIN TIME PARTIAL: CPT

## 2025-01-02 PROCEDURE — 74176 CT ABD & PELVIS W/O CONTRAST: CPT | Performed by: RADIOLOGY

## 2025-01-02 PROCEDURE — 81001 URINALYSIS AUTO W/SCOPE: CPT

## 2025-01-02 PROCEDURE — 71045 X-RAY EXAM CHEST 1 VIEW: CPT | Performed by: RADIOLOGY

## 2025-01-02 PROCEDURE — 25010000002 AMIODARONE IN DEXTROSE 5% 150-4.21 MG/100ML-% SOLUTION: Performed by: NURSE PRACTITIONER

## 2025-01-02 PROCEDURE — 71250 CT THORAX DX C-: CPT

## 2025-01-02 PROCEDURE — 25010000002 CEFEPIME PER 500 MG

## 2025-01-02 PROCEDURE — 83605 ASSAY OF LACTIC ACID: CPT | Performed by: INTERNAL MEDICINE

## 2025-01-02 PROCEDURE — 87070 CULTURE OTHR SPECIMN AEROBIC: CPT

## 2025-01-02 PROCEDURE — 25010000002 FENTANYL 10 MCG/1 ML NS: Performed by: NURSE PRACTITIONER

## 2025-01-02 PROCEDURE — 71250 CT THORAX DX C-: CPT | Performed by: RADIOLOGY

## 2025-01-02 PROCEDURE — 25010000002 NALOXONE HCL 2 MG/2ML SOLUTION PREFILLED SYRINGE: Performed by: INTERNAL MEDICINE

## 2025-01-02 PROCEDURE — 25010000002 MIDAZOLAM 1 MG/ML 100ML NS 100 MG/100ML SOLUTION: Performed by: STUDENT IN AN ORGANIZED HEALTH CARE EDUCATION/TRAINING PROGRAM

## 2025-01-02 PROCEDURE — 5A1955Z RESPIRATORY VENTILATION, GREATER THAN 96 CONSECUTIVE HOURS: ICD-10-PCS | Performed by: INTERNAL MEDICINE

## 2025-01-02 PROCEDURE — 83605 ASSAY OF LACTIC ACID: CPT | Performed by: STUDENT IN AN ORGANIZED HEALTH CARE EDUCATION/TRAINING PROGRAM

## 2025-01-02 PROCEDURE — 25810000003 SODIUM CHLORIDE 0.9 % SOLUTION: Performed by: NURSE PRACTITIONER

## 2025-01-02 PROCEDURE — 03HY32Z INSERTION OF MONITORING DEVICE INTO UPPER ARTERY, PERCUTANEOUS APPROACH: ICD-10-PCS | Performed by: NURSE PRACTITIONER

## 2025-01-02 PROCEDURE — 25010000002 FENTANYL CITRATE (PF) 2500 MCG/50ML SOLUTION: Performed by: STUDENT IN AN ORGANIZED HEALTH CARE EDUCATION/TRAINING PROGRAM

## 2025-01-02 PROCEDURE — 82375 ASSAY CARBOXYHB QUANT: CPT

## 2025-01-02 PROCEDURE — 87147 CULTURE TYPE IMMUNOLOGIC: CPT

## 2025-01-02 PROCEDURE — 87186 SC STD MICRODIL/AGAR DIL: CPT

## 2025-01-02 PROCEDURE — 84300 ASSAY OF URINE SODIUM: CPT | Performed by: INTERNAL MEDICINE

## 2025-01-02 PROCEDURE — 84550 ASSAY OF BLOOD/URIC ACID: CPT | Performed by: INTERNAL MEDICINE

## 2025-01-02 PROCEDURE — 82820 HEMOGLOBIN-OXYGEN AFFINITY: CPT

## 2025-01-02 PROCEDURE — 25810000003 SODIUM CHLORIDE 0.9 % SOLUTION: Performed by: STUDENT IN AN ORGANIZED HEALTH CARE EDUCATION/TRAINING PROGRAM

## 2025-01-02 PROCEDURE — 25010000002 AMIODARONE IN DEXTROSE 5% 360-4.14 MG/200ML-% SOLUTION: Performed by: NURSE PRACTITIONER

## 2025-01-02 PROCEDURE — 70450 CT HEAD/BRAIN W/O DYE: CPT

## 2025-01-02 PROCEDURE — 85520 HEPARIN ASSAY: CPT

## 2025-01-02 PROCEDURE — 87205 SMEAR GRAM STAIN: CPT | Performed by: INTERNAL MEDICINE

## 2025-01-02 PROCEDURE — 84100 ASSAY OF PHOSPHORUS: CPT | Performed by: INTERNAL MEDICINE

## 2025-01-02 PROCEDURE — 85025 COMPLETE CBC W/AUTO DIFF WBC: CPT | Performed by: NURSE PRACTITIONER

## 2025-01-02 PROCEDURE — 63710000001 INSULIN REGULAR HUMAN PER 5 UNITS: Performed by: INTERNAL MEDICINE

## 2025-01-02 PROCEDURE — 25810000003 SODIUM CHLORIDE 0.9 % SOLUTION 250 ML FLEX CONT

## 2025-01-02 PROCEDURE — 25010000002 BUMETANIDE PER 0.5 MG: Performed by: INTERNAL MEDICINE

## 2025-01-02 PROCEDURE — 82570 ASSAY OF URINE CREATININE: CPT | Performed by: INTERNAL MEDICINE

## 2025-01-02 PROCEDURE — 94003 VENT MGMT INPAT SUBQ DAY: CPT

## 2025-01-02 PROCEDURE — 71045 X-RAY EXAM CHEST 1 VIEW: CPT

## 2025-01-02 PROCEDURE — 83880 ASSAY OF NATRIURETIC PEPTIDE: CPT | Performed by: STUDENT IN AN ORGANIZED HEALTH CARE EDUCATION/TRAINING PROGRAM

## 2025-01-02 PROCEDURE — 85610 PROTHROMBIN TIME: CPT

## 2025-01-02 PROCEDURE — 83615 LACTATE (LD) (LDH) ENZYME: CPT | Performed by: INTERNAL MEDICINE

## 2025-01-02 PROCEDURE — 99291 CRITICAL CARE FIRST HOUR: CPT | Performed by: INTERNAL MEDICINE

## 2025-01-02 PROCEDURE — 25010000002 MAGNESIUM SULFATE 2 GM/50ML SOLUTION: Performed by: NURSE PRACTITIONER

## 2025-01-02 PROCEDURE — 80053 COMPREHEN METABOLIC PANEL: CPT | Performed by: STUDENT IN AN ORGANIZED HEALTH CARE EDUCATION/TRAINING PROGRAM

## 2025-01-02 PROCEDURE — 96376 TX/PRO/DX INJ SAME DRUG ADON: CPT

## 2025-01-02 PROCEDURE — 74018 RADEX ABDOMEN 1 VIEW: CPT

## 2025-01-02 PROCEDURE — 94640 AIRWAY INHALATION TREATMENT: CPT

## 2025-01-02 PROCEDURE — 25010000002 METRONIDAZOLE 500 MG/100ML SOLUTION: Performed by: INTERNAL MEDICINE

## 2025-01-02 PROCEDURE — 83735 ASSAY OF MAGNESIUM: CPT | Performed by: STUDENT IN AN ORGANIZED HEALTH CARE EDUCATION/TRAINING PROGRAM

## 2025-01-02 PROCEDURE — 94002 VENT MGMT INPAT INIT DAY: CPT

## 2025-01-02 PROCEDURE — 87641 MR-STAPH DNA AMP PROBE: CPT

## 2025-01-02 PROCEDURE — 25010000002 HEPARIN (PORCINE) 25000-0.45 UT/250ML-% SOLUTION

## 2025-01-02 PROCEDURE — 87899 AGENT NOS ASSAY W/OPTIC: CPT

## 2025-01-02 PROCEDURE — 83735 ASSAY OF MAGNESIUM: CPT | Performed by: NURSE PRACTITIONER

## 2025-01-02 PROCEDURE — 94761 N-INVAS EAR/PLS OXIMETRY MLT: CPT

## 2025-01-02 PROCEDURE — 93005 ELECTROCARDIOGRAM TRACING: CPT

## 2025-01-02 PROCEDURE — 25010000002 MIDAZOLAM 1 MG/ML 100ML NS 100 MG/100ML SOLUTION: Performed by: NURSE PRACTITIONER

## 2025-01-02 PROCEDURE — 82550 ASSAY OF CK (CPK): CPT | Performed by: INTERNAL MEDICINE

## 2025-01-02 PROCEDURE — 25010000002 CALCIUM GLUCONATE 2-0.675 GM/100ML-% SOLUTION: Performed by: INTERNAL MEDICINE

## 2025-01-02 PROCEDURE — 85025 COMPLETE CBC W/AUTO DIFF WBC: CPT | Performed by: STUDENT IN AN ORGANIZED HEALTH CARE EDUCATION/TRAINING PROGRAM

## 2025-01-02 PROCEDURE — P9047 ALBUMIN (HUMAN), 25%, 50ML: HCPCS | Performed by: INTERNAL MEDICINE

## 2025-01-02 PROCEDURE — 36620 INSERTION CATHETER ARTERY: CPT | Performed by: NURSE PRACTITIONER

## 2025-01-02 PROCEDURE — 74176 CT ABD & PELVIS W/O CONTRAST: CPT

## 2025-01-02 PROCEDURE — 87086 URINE CULTURE/COLONY COUNT: CPT

## 2025-01-02 PROCEDURE — 84156 ASSAY OF PROTEIN URINE: CPT | Performed by: INTERNAL MEDICINE

## 2025-01-02 PROCEDURE — 96366 THER/PROPH/DIAG IV INF ADDON: CPT

## 2025-01-02 PROCEDURE — 84145 PROCALCITONIN (PCT): CPT

## 2025-01-02 RX ORDER — NALOXONE HYDROCHLORIDE 1 MG/ML
INJECTION INTRAMUSCULAR; INTRAVENOUS; SUBCUTANEOUS
Status: DISCONTINUED
Start: 2025-01-02 | End: 2025-01-02 | Stop reason: HOSPADM

## 2025-01-02 RX ORDER — DEXTROSE MONOHYDRATE 25 G/50ML
10-50 INJECTION, SOLUTION INTRAVENOUS
Status: DISCONTINUED | OUTPATIENT
Start: 2025-01-02 | End: 2025-01-07

## 2025-01-02 RX ORDER — NYSTATIN 100000 [USP'U]/G
POWDER TOPICAL 4 TIMES DAILY
Status: ON HOLD | COMMUNITY

## 2025-01-02 RX ORDER — SODIUM POLYSTYRENE SULFONATE 4.1 MEQ/G
30 POWDER, FOR SUSPENSION ORAL; RECTAL ONCE
Status: COMPLETED | OUTPATIENT
Start: 2025-01-02 | End: 2025-01-02

## 2025-01-02 RX ORDER — MIDAZOLAM IN NACL,ISO-OSMOT/PF 50 MG/50ML
1-10 INFUSION BOTTLE (ML) INTRAVENOUS
Status: DISPENSED | OUTPATIENT
Start: 2025-01-02 | End: 2025-01-07

## 2025-01-02 RX ORDER — ACETAMINOPHEN 500 MG
1000 TABLET ORAL ONCE
Status: COMPLETED | OUTPATIENT
Start: 2025-01-02 | End: 2025-01-02

## 2025-01-02 RX ORDER — FAMOTIDINE 10 MG/ML
20 INJECTION, SOLUTION INTRAVENOUS 2 TIMES DAILY
Status: DISCONTINUED | OUTPATIENT
Start: 2025-01-02 | End: 2025-01-18

## 2025-01-02 RX ORDER — ROCURONIUM BROMIDE 10 MG/ML
50 INJECTION, SOLUTION INTRAVENOUS ONCE
Status: COMPLETED | OUTPATIENT
Start: 2025-01-02 | End: 2025-01-02

## 2025-01-02 RX ORDER — IPRATROPIUM BROMIDE AND ALBUTEROL SULFATE 2.5; .5 MG/3ML; MG/3ML
3 SOLUTION RESPIRATORY (INHALATION) EVERY 4 HOURS PRN
Status: DISCONTINUED | OUTPATIENT
Start: 2025-01-02 | End: 2025-02-03 | Stop reason: HOSPADM

## 2025-01-02 RX ORDER — ACETAMINOPHEN 325 MG/1
650 TABLET ORAL ONCE
Status: COMPLETED | OUTPATIENT
Start: 2025-01-02 | End: 2025-01-02

## 2025-01-02 RX ORDER — METRONIDAZOLE 500 MG/100ML
500 INJECTION, SOLUTION INTRAVENOUS EVERY 8 HOURS
Status: DISCONTINUED | OUTPATIENT
Start: 2025-01-02 | End: 2025-01-06

## 2025-01-02 RX ORDER — ALBUMIN (HUMAN) 12.5 G/50ML
25 SOLUTION INTRAVENOUS 2 TIMES DAILY
Status: COMPLETED | OUTPATIENT
Start: 2025-01-02 | End: 2025-01-03

## 2025-01-02 RX ORDER — BISACODYL 5 MG/1
5 TABLET, DELAYED RELEASE ORAL DAILY PRN
Status: DISCONTINUED | OUTPATIENT
Start: 2025-01-02 | End: 2025-01-02

## 2025-01-02 RX ORDER — ROCURONIUM BROMIDE 10 MG/ML
1 INJECTION, SOLUTION INTRAVENOUS ONCE
Status: DISCONTINUED | OUTPATIENT
Start: 2025-01-02 | End: 2025-01-02

## 2025-01-02 RX ORDER — ACETAMINOPHEN 325 MG/1
650 TABLET ORAL EVERY 8 HOURS PRN
Status: DISCONTINUED | OUTPATIENT
Start: 2025-01-02 | End: 2025-01-03

## 2025-01-02 RX ORDER — AMOXICILLIN 250 MG
2 CAPSULE ORAL 2 TIMES DAILY
Status: DISCONTINUED | OUTPATIENT
Start: 2025-01-02 | End: 2025-01-08

## 2025-01-02 RX ORDER — CALCIUM GLUCONATE 20 MG/ML
2000 INJECTION, SOLUTION INTRAVENOUS ONCE
Status: COMPLETED | OUTPATIENT
Start: 2025-01-02 | End: 2025-01-02

## 2025-01-02 RX ORDER — HEPARIN SODIUM 1000 [USP'U]/ML
4000 INJECTION, SOLUTION INTRAVENOUS; SUBCUTANEOUS ONCE
Status: COMPLETED | OUTPATIENT
Start: 2025-01-02 | End: 2025-01-02

## 2025-01-02 RX ORDER — SODIUM CHLORIDE 9 MG/ML
40 INJECTION, SOLUTION INTRAVENOUS AS NEEDED
Status: DISCONTINUED | OUTPATIENT
Start: 2025-01-02 | End: 2025-01-04

## 2025-01-02 RX ORDER — ALBUTEROL SULFATE 5 MG/ML
10 SOLUTION RESPIRATORY (INHALATION) ONCE
Status: COMPLETED | OUTPATIENT
Start: 2025-01-02 | End: 2025-01-02

## 2025-01-02 RX ORDER — CHLORHEXIDINE GLUCONATE ORAL RINSE 1.2 MG/ML
15 SOLUTION DENTAL EVERY 12 HOURS SCHEDULED
Status: DISCONTINUED | OUTPATIENT
Start: 2025-01-02 | End: 2025-01-18

## 2025-01-02 RX ORDER — FENTANYL CITRATE-0.9 % NACL/PF 10 MCG/ML
50-300 PLASTIC BAG, INJECTION (ML) INTRAVENOUS
Status: DISCONTINUED | OUTPATIENT
Start: 2025-01-02 | End: 2025-01-02 | Stop reason: HOSPADM

## 2025-01-02 RX ORDER — ALBUTEROL SULFATE 0.83 MG/ML
2.5 SOLUTION RESPIRATORY (INHALATION) 2 TIMES DAILY
Status: DISCONTINUED | OUTPATIENT
Start: 2025-01-02 | End: 2025-01-02 | Stop reason: HOSPADM

## 2025-01-02 RX ORDER — SODIUM CHLORIDE 0.9 % (FLUSH) 0.9 %
10 SYRINGE (ML) INJECTION AS NEEDED
Status: DISCONTINUED | OUTPATIENT
Start: 2025-01-02 | End: 2025-01-15

## 2025-01-02 RX ORDER — NICOTINE POLACRILEX 4 MG
15 LOZENGE BUCCAL
Status: DISCONTINUED | OUTPATIENT
Start: 2025-01-02 | End: 2025-01-03

## 2025-01-02 RX ORDER — SODIUM CHLORIDE 9 MG/ML
100 INJECTION, SOLUTION INTRAVENOUS CONTINUOUS
Status: ACTIVE | OUTPATIENT
Start: 2025-01-02 | End: 2025-01-03

## 2025-01-02 RX ORDER — MAGNESIUM SULFATE HEPTAHYDRATE 40 MG/ML
2 INJECTION, SOLUTION INTRAVENOUS ONCE
Status: COMPLETED | OUTPATIENT
Start: 2025-01-02 | End: 2025-01-02

## 2025-01-02 RX ORDER — NOREPINEPHRINE BITARTRATE 0.03 MG/ML
.02-.3 INJECTION, SOLUTION INTRAVENOUS
Status: DISCONTINUED | OUTPATIENT
Start: 2025-01-02 | End: 2025-01-15

## 2025-01-02 RX ORDER — SODIUM CHLORIDE 0.9 % (FLUSH) 0.9 %
10 SYRINGE (ML) INJECTION EVERY 12 HOURS SCHEDULED
Status: DISCONTINUED | OUTPATIENT
Start: 2025-01-02 | End: 2025-01-04

## 2025-01-02 RX ORDER — NALOXONE HYDROCHLORIDE 1 MG/ML
2 INJECTION INTRAMUSCULAR; INTRAVENOUS; SUBCUTANEOUS ONCE
Status: COMPLETED | OUTPATIENT
Start: 2025-01-02 | End: 2025-01-02

## 2025-01-02 RX ORDER — BUMETANIDE 0.25 MG/ML
0.5 INJECTION, SOLUTION INTRAMUSCULAR; INTRAVENOUS ONCE
Status: COMPLETED | OUTPATIENT
Start: 2025-01-02 | End: 2025-01-02

## 2025-01-02 RX ORDER — IBUPROFEN 600 MG/1
1 TABLET ORAL
Status: DISCONTINUED | OUTPATIENT
Start: 2025-01-02 | End: 2025-01-07

## 2025-01-02 RX ORDER — DEXTROSE MONOHYDRATE AND SODIUM CHLORIDE 5; .45 G/100ML; G/100ML
100 INJECTION, SOLUTION INTRAVENOUS CONTINUOUS
Status: DISCONTINUED | OUTPATIENT
Start: 2025-01-02 | End: 2025-01-02 | Stop reason: HOSPADM

## 2025-01-02 RX ORDER — LACTULOSE 10 G/15ML
30 SOLUTION ORAL ONCE
Status: COMPLETED | OUTPATIENT
Start: 2025-01-02 | End: 2025-01-02

## 2025-01-02 RX ORDER — BISACODYL 10 MG
10 SUPPOSITORY, RECTAL RECTAL DAILY PRN
Status: DISCONTINUED | OUTPATIENT
Start: 2025-01-02 | End: 2025-01-08

## 2025-01-02 RX ORDER — HEPARIN SODIUM 10000 [USP'U]/100ML
22 INJECTION, SOLUTION INTRAVENOUS
Status: DISCONTINUED | OUTPATIENT
Start: 2025-01-02 | End: 2025-01-04

## 2025-01-02 RX ORDER — POLYETHYLENE GLYCOL 3350 17 G/17G
17 POWDER, FOR SOLUTION ORAL DAILY PRN
Status: DISCONTINUED | OUTPATIENT
Start: 2025-01-02 | End: 2025-01-08

## 2025-01-02 RX ADMIN — SODIUM BICARBONATE 50 MEQ: 84 INJECTION INTRAVENOUS at 14:31

## 2025-01-02 RX ADMIN — ACETAMINOPHEN 650 MG: 325 TABLET ORAL at 05:16

## 2025-01-02 RX ADMIN — CHLORHEXIDINE GLUCONATE 0.12% ORAL RINSE 15 ML: 1.2 LIQUID ORAL at 12:47

## 2025-01-02 RX ADMIN — SODIUM CHLORIDE 500 ML: 9 INJECTION, SOLUTION INTRAVENOUS at 06:55

## 2025-01-02 RX ADMIN — SODIUM CHLORIDE 200 ML/HR: 4.5 INJECTION, SOLUTION INTRAVENOUS at 09:37

## 2025-01-02 RX ADMIN — Medication 10 ML: at 01:11

## 2025-01-02 RX ADMIN — SODIUM POLYSTYRENE SULFONATE 30 G: 4.1 POWDER, FOR SUSPENSION ORAL; RECTAL at 00:24

## 2025-01-02 RX ADMIN — MAGNESIUM SULFATE IN WATER FOR 2 G: 40 INJECTION INTRAVENOUS at 17:27

## 2025-01-02 RX ADMIN — MUPIROCIN 1 APPLICATION: 20 OINTMENT TOPICAL at 12:56

## 2025-01-02 RX ADMIN — CEFEPIME 2000 MG: 2 INJECTION, POWDER, FOR SOLUTION INTRAVENOUS at 20:56

## 2025-01-02 RX ADMIN — LACTULOSE 30 G: 20 SOLUTION ORAL at 08:40

## 2025-01-02 RX ADMIN — ALBUTEROL SULFATE 2.5 MG: 2.5 SOLUTION RESPIRATORY (INHALATION) at 08:26

## 2025-01-02 RX ADMIN — AMIODARONE HYDROCHLORIDE 150 MG: 1.5 INJECTION, SOLUTION INTRAVENOUS at 17:15

## 2025-01-02 RX ADMIN — INSULIN HUMAN 20 UNITS: 100 INJECTION, SOLUTION PARENTERAL at 01:08

## 2025-01-02 RX ADMIN — INSULIN HUMAN 9.7 UNITS/HR: 1 INJECTION, SOLUTION INTRAVENOUS at 09:37

## 2025-01-02 RX ADMIN — METRONIDAZOLE 500 MG: 500 INJECTION, SOLUTION INTRAVENOUS at 20:57

## 2025-01-02 RX ADMIN — CHLORHEXIDINE GLUCONATE 0.12% ORAL RINSE 15 ML: 1.2 LIQUID ORAL at 20:56

## 2025-01-02 RX ADMIN — ALBUTEROL SULFATE 10 MG: 2.5 SOLUTION RESPIRATORY (INHALATION) at 00:24

## 2025-01-02 RX ADMIN — LACTULOSE 30 G: 20 SOLUTION ORAL at 01:13

## 2025-01-02 RX ADMIN — NOREPINEPHRINE BITARTRATE 0.06 MCG/KG/MIN: 0.03 INJECTION, SOLUTION INTRAVENOUS at 06:12

## 2025-01-02 RX ADMIN — ROCURONIUM BROMIDE 50 MG: 10 SOLUTION INTRAVENOUS at 04:18

## 2025-01-02 RX ADMIN — Medication 10 ML: at 20:57

## 2025-01-02 RX ADMIN — ACETAMINOPHEN 650 MG: 325 TABLET ORAL at 22:28

## 2025-01-02 RX ADMIN — SENNOSIDES AND DOCUSATE SODIUM 2 TABLET: 50; 8.6 TABLET ORAL at 12:48

## 2025-01-02 RX ADMIN — SODIUM ZIRCONIUM CYCLOSILICATE 10 G: 10 POWDER, FOR SUSPENSION ORAL at 14:31

## 2025-01-02 RX ADMIN — SODIUM POLYSTYRENE SULFONATE 30 G: 4.1 POWDER, FOR SUSPENSION ORAL; RECTAL at 08:39

## 2025-01-02 RX ADMIN — NOREPINEPHRINE BITARTRATE 0.08 MCG/KG/MIN: 0.03 INJECTION, SOLUTION INTRAVENOUS at 01:49

## 2025-01-02 RX ADMIN — ALBUMIN (HUMAN) 25 G: 0.25 INJECTION, SOLUTION INTRAVENOUS at 20:51

## 2025-01-02 RX ADMIN — NOREPINEPHRINE BITARTRATE 0.1 MCG/KG/MIN: 0.03 INJECTION, SOLUTION INTRAVENOUS at 06:41

## 2025-01-02 RX ADMIN — Medication 50 MCG/HR: at 17:05

## 2025-01-02 RX ADMIN — AMIODARONE HYDROCHLORIDE 1 MG/MIN: 1.8 INJECTION, SOLUTION INTRAVENOUS at 22:25

## 2025-01-02 RX ADMIN — FAMOTIDINE 20 MG: 10 INJECTION, SOLUTION INTRAVENOUS at 20:57

## 2025-01-02 RX ADMIN — VANCOMYCIN HYDROCHLORIDE 1000 MG: 1 INJECTION, POWDER, LYOPHILIZED, FOR SOLUTION INTRAVENOUS at 17:11

## 2025-01-02 RX ADMIN — SODIUM CHLORIDE 50 MCG/HR: 9 INJECTION, SOLUTION INTRAVENOUS at 09:14

## 2025-01-02 RX ADMIN — SENNOSIDES AND DOCUSATE SODIUM 2 TABLET: 50; 8.6 TABLET ORAL at 20:57

## 2025-01-02 RX ADMIN — MIDAZOLAM HYDROCHLORIDE 10 MG/HR: 1 INJECTION, SOLUTION INTRAVENOUS at 17:05

## 2025-01-02 RX ADMIN — INSULIN HUMAN 20 UNITS: 100 INJECTION, SOLUTION PARENTERAL at 00:10

## 2025-01-02 RX ADMIN — CEFEPIME 2000 MG: 2 INJECTION, POWDER, FOR SOLUTION INTRAVENOUS at 12:47

## 2025-01-02 RX ADMIN — Medication 10 ML: at 12:49

## 2025-01-02 RX ADMIN — NOREPINEPHRINE BITARTRATE 0.04 MCG/KG/MIN: 0.03 INJECTION, SOLUTION INTRAVENOUS at 17:27

## 2025-01-02 RX ADMIN — CALCIUM GLUCONATE 2000 MG: 20 INJECTION, SOLUTION INTRAVENOUS at 09:04

## 2025-01-02 RX ADMIN — ACETAMINOPHEN 1000 MG: 500 TABLET ORAL at 02:22

## 2025-01-02 RX ADMIN — MIDAZOLAM HYDROCHLORIDE 10 MG/HR: 1 INJECTION, SOLUTION INTRAVENOUS at 06:30

## 2025-01-02 RX ADMIN — BUMETANIDE 0.5 MG: 0.25 INJECTION INTRAMUSCULAR; INTRAVENOUS at 18:04

## 2025-01-02 RX ADMIN — HEPARIN SODIUM 4000 UNITS: 1000 INJECTION INTRAVENOUS; SUBCUTANEOUS at 22:23

## 2025-01-02 RX ADMIN — SODIUM CHLORIDE 100 ML/HR: 9 INJECTION, SOLUTION INTRAVENOUS at 23:12

## 2025-01-02 RX ADMIN — INSULIN HUMAN 6.4 UNITS/HR: 1 INJECTION, SOLUTION INTRAVENOUS at 06:14

## 2025-01-02 RX ADMIN — INSULIN HUMAN 13.7 UNITS/HR: 1 INJECTION, SOLUTION INTRAVENOUS at 15:43

## 2025-01-02 RX ADMIN — FAMOTIDINE 20 MG: 10 INJECTION, SOLUTION INTRAVENOUS at 12:48

## 2025-01-02 RX ADMIN — HEPARIN SODIUM 9 UNITS/KG/HR: 10000 INJECTION, SOLUTION INTRAVENOUS at 15:43

## 2025-01-02 RX ADMIN — NOREPINEPHRINE BITARTRATE 0.1 MCG/KG/MIN: 0.03 INJECTION, SOLUTION INTRAVENOUS at 07:40

## 2025-01-02 RX ADMIN — DEXTROSE AND SODIUM CHLORIDE 100 ML/HR: 5; 450 INJECTION, SOLUTION INTRAVENOUS at 02:17

## 2025-01-02 RX ADMIN — INSULIN HUMAN 14.3 UNITS/HR: 1 INJECTION, SOLUTION INTRAVENOUS at 21:10

## 2025-01-02 RX ADMIN — MUPIROCIN 1 APPLICATION: 20 OINTMENT TOPICAL at 20:55

## 2025-01-02 RX ADMIN — NALOXONE HYDROCHLORIDE 2 MG: 1 INJECTION PARENTERAL at 00:13

## 2025-01-02 RX ADMIN — NOREPINEPHRINE BITARTRATE 0.1 MCG/KG/MIN: 0.03 INJECTION, SOLUTION INTRAVENOUS at 02:03

## 2025-01-02 RX ADMIN — AMIODARONE HYDROCHLORIDE 1 MG/MIN: 1.8 INJECTION, SOLUTION INTRAVENOUS at 17:27

## 2025-01-02 NOTE — CONSULTS
Diabetes Education    Patient Name:  Natalia Arzate  YOB: 1985  MRN: [id number]  Admit Date:  1/1/2025        Patient being transported to [facility].and is intubated Thank you for the consult      Electronically signed by:  Rosa Isela Sullivan RN  01/02/25 08:26 EST

## 2025-01-02 NOTE — PROGRESS NOTES
Pharmacy Consult - Vancomycin Dosing and Monitoring (Renal Dysfunction / Dialysis)    Natalia Arzate is a 39 y.o. female receiving vancomycin therapy.     Indication:  Empiric, Potential Pneumonia  Consulting Provider:   Intensivist  ID Consult:  No    Goal Trough:  15-20 mcg/mL    Current Antimicrobial Therapy  Anti-Infectives (From admission, onward)      Ordered     Dose/Rate Route Frequency Start Stop    01/02/25 1548  vancomycin (dosing per levels)        Ordering Provider: David Lazaro PharmD     Not Applicable Daily 01/03/25 0900 01/10/25 0859    01/02/25 1231  cefepime 2000 mg IVPB in 100 mL NS (MBP)        Ordering Provider: Bettie Walter APRN    2,000 mg  over 4 Hours Intravenous Every 12 Hours 01/02/25 2100 01/06/25 2059    01/02/25 1547  vancomycin (VANCOCIN) 1,000 mg in sodium chloride 0.9 % 250 mL IVPB-VTB        Ordering Provider: David Lazaro PharmD    1,000 mg  250 mL/hr over 60 Minutes Intravenous Once 01/02/25 1645      01/02/25 1511  Pharmacy to dose vancomycin        Ordering Provider: David Lazaro PharmD     Not Applicable Continuous PRN 01/02/25 1509 01/09/25 1508    01/02/25 1235  cefepime 2000 mg IVPB in 100 mL NS (MBP)        Ordering Provider: Bettie Walter APRN    2,000 mg  over 30 Minutes Intravenous Once 01/02/25 1330 01/02/25 1317    01/02/25 1228  cefTRIAXone (ROCEPHIN) 2,000 mg in sodium chloride 0.9 % 100 mL MBP  Status:  Discontinued        Ordering Provider: Bettie Walter APRN    2,000 mg  200 mL/hr over 30 Minutes Intravenous Every 24 Hours 01/02/25 1315 01/02/25 1230          Allergies  Allergies as of 01/02/2025 - Reviewed 01/02/2025   Allergen Reaction Noted    Salvia officinalis Itching, Other (See Comments), and Shortness Of Breath 03/02/2023    Shellfish allergy Anaphylaxis 12/30/2018    Toradol [ketorolac tromethamine] Anaphylaxis and Hives 06/27/2016    Tree nut Anaphylaxis and Shortness Of Breath 03/16/2022    Haldol [haloperidol] Hives and  Mental Status Change 03/04/2020    Tramadol Hives and Swelling 12/08/2018    Amoxicillin Hives and Rash 06/27/2016    Penicillins Hives and Rash 06/27/2016     Labs  Results from last 7 days   Lab Units 01/02/25  1314 01/02/25  0900 01/02/25  0453   BUN mg/dL 39* 40* 41*   CREATININE mg/dL 1.69* 2.39* 2.50*     Results from last 7 days   Lab Units 01/02/25  1314 01/02/25  0900 01/01/25  2039   WBC 10*3/mm3 9.71 10.48 13.32*     Evaluation of Dosing     Last Dose Received in the ED/Outside Facility: 2500mg IV x 1 given 1/2 at ~2100  Is Patient on Dialysis or Renal Replacement: No; potential RRT    Height -    Weight -      Estimated Creatinine Clearance: 68.7 mL/min (A) (by C-G formula based on SCr of 1.69 mg/dL (H)).    Intake & Output (last 2 days)         12/31 0701 01/01 0700 01/01 0701  01/02 0700 01/02 0701  01/03 0700    I.V.   304    Other   100    Total Intake   404    Urine   950    Total Output   950    Net   -546                 Microbiology  Microbiology Results (last 10 days)       Procedure Component Value - Date/Time    MRSA Screen, PCR (Inpatient) - Swab, Nares [[id number]]  (Abnormal) Collected: 01/02/25 1246    Lab Status: Final result Specimen: Swab from Nares Updated: 01/02/25 1504     MRSA PCR Positive    Narrative:      The negative predictive value of this diagnostic test is high and should only be used to consider de-escalating anti-MRSA therapy. A positive result may indicate colonization with MRSA and must be correlated clinically.    Respiratory Panel PCR w/COVID-19(SARS-CoV-2) [facility] In-House, NP Swab in UTM/VTM, 2 HR TAT - Swab, Nasopharynx [[id number]]  (Normal) Collected: 01/01/25 2023    Lab Status: Final result Specimen: Swab from Nasopharynx Updated: 01/01/25 2201     ADENOVIRUS, PCR Not Detected     Coronavirus 229E Not Detected     Coronavirus HKU1 Not Detected     Coronavirus NL63 Not Detected     Coronavirus OC43 Not Detected     COVID19 Not Detected     Human  Metapneumovirus Not Detected     Human Rhinovirus/Enterovirus Not Detected     Influenza A PCR Not Detected     Influenza B PCR Not Detected     Parainfluenza Virus 1 Not Detected     Parainfluenza Virus 2 Not Detected     Parainfluenza Virus 3 Not Detected     Parainfluenza Virus 4 Not Detected     RSV, PCR Not Detected     Bordetella pertussis pcr Not Detected     Bordetella parapertussis PCR Not Detected     Chlamydophila pneumoniae PCR Not Detected     Mycoplasma pneumo by PCR Not Detected    Narrative:      In the setting of a positive respiratory panel with a viral infection PLUS a negative procalcitonin without other underlying concern for bacterial infection, consider observing off antibiotics or discontinuation of antibiotics and continue supportive care. If the respiratory panel is positive for atypical bacterial infection (Bordetella pertussis, Chlamydophila pneumoniae, or Mycoplasma pneumoniae), consider antibiotic de-escalation to target atypical bacterial infection.          Vancomycin Levels  Results from last 7 days   Lab Units 01/02/25  1314   VANCOMYCIN RM mcg/mL 13.90             Assessment/Plan:  Reviewed vancomycin level from previous load at OSH.  Will give additional vancomycin 1g IV x 1 now.  Level check with AM labs prior to further dosing.  There is a potential to start RRT on this patient.  Will follow closely for now.  Cefepime 2g IV q12h for now, with plans to increase to q8h if CRRT initiated.  Pharmacy will continue to follow and adjust dose based on renal function, drug levels, and clinical status.    Thanks,  David Lazaro, PharmD, BCPS, BCCCP  01/02/25  15:51 EST

## 2025-01-02 NOTE — PROGRESS NOTES
"Nephrology Progress Note      Subjective     Patient is intubated on mechanical ventilation, unable to obtain subjective.  Patient has received antihyperkalemic medications overnight.  Remains on pressors    Objective       Vital signs :     Temp:  [98.2 °F (36.8 °C)-104 °F (40 °C)] 101.1 °F (38.4 °C)  Heart Rate:  [] 103  Resp:  [24-35] 35  BP: ()/(38-88) 125/52  FiO2 (%):  [80 %-100 %] 100 %    Intake/Output                   01/02/25 0701 - 01/03/25 0700     [id number] 5320-7223 Total              Intake    IV Piggyback  500  -- 500    Total Intake 500 -- 500       Output    Total Output -- -- --             Physical Exam:    General Appearance : Intubated  Lungs : clear to auscultation, respirations regular  Heart :  regular rhythm & normal rate, normal S1, S2 and no murmur, no rub  Abdomen : Soft, nondistended  Extremities : No edema,   Neurologic :   Sedated      Laboratory Data :     Albumin Albumin   Date Value Ref Range Status   01/02/2025 3.5 3.5 - 5.2 g/dL Final   01/01/2025 3.5 3.5 - 5.2 g/dL Final      Magnesium Magnesium   Date Value Ref Range Status   01/02/2025 2.0 1.6 - 2.6 mg/dL Final   01/01/2025 1.5 (L) 1.6 - 2.6 mg/dL Final          PTH               No results found for: \"PTH\"    CBC and coagulation:  Results from last 7 days   Lab Units 01/02/25  0610 01/02/25  0252 01/01/25  2353 01/01/25  2333 01/01/25  2039 01/01/25 2039 12/31/24  1440   PROCALCITONIN ng/mL  --   --  6.26*  --   --   --   --    LACTATE mmol/L 2.7* 3.0*  --  3.7*   < > 2.9*  --    SED RATE mm/hr  --   --   --   --   --  111*  --    WBC 10*3/mm3  --   --   --   --   --  13.32* 6   HEMOGLOBIN g/dL  --   --   --   --   --  9.3* 10.2*   HEMATOCRIT %  --   --   --   --   --  31.2* 31.9*   MCV fL  --   --   --   --   --  96.9 91   MCHC g/dL  --   --   --   --   --  29.8* 32.7   PLATELETS 10*3/mm3  --   --   --   --   --  252 200    < > = values in this interval not displayed.     Acid/base balance:  Results from last 7 " days   Lab Units 01/01/25 2122   PH, ARTERIAL pH units 7.018*   PO2 ART mm Hg 83.6   PCO2, ARTERIAL mm Hg 79.3*   HCO3 ART mmol/L 20.4     Renal and electrolytes:    Results from last 7 days   Lab Units 01/02/25  0453 01/02/25  0051 01/01/25  2333 01/01/25  2145   SODIUM mmol/L 136 136 136 133*   POTASSIUM mmol/L 7.0* 6.6* 7.2* 8.2*   MAGNESIUM mg/dL  --  2.0  --  1.5*   CHLORIDE mmol/L 101 99 101 98   CO2 mmol/L 22.3 20.4* 19.5* 19.5*   BUN mg/dL 41* 41* 40* 40*   CREATININE mg/dL 2.50* 3.02* 2.88* 2.85*   CALCIUM mg/dL 8.4* 8.4* 8.2* 8.3*   PHOSPHORUS mg/dL  --  8.6*  --  9.3*     Estimated Creatinine Clearance: 46.4 mL/min (A) (by C-G formula based on SCr of 2.5 mg/dL (H)).  @GFRCG:3@   Liver and pancreatic function:  Results from last 7 days   Lab Units 01/02/25  0453 01/01/25  2145   ALBUMIN g/dL 3.5 3.5   BILIRUBIN mg/dL 0.4 0.6   ALK PHOS U/L 142* 129*   AST (SGOT) U/L 136* 120*   ALT (SGPT) U/L 71* 60*         Cardiac:  Results from last 7 days   Lab Units 01/02/25  0315 01/01/25  2039   PROBNP pg/mL 6,035.0* 5,671.0*     Liver and pancreatic function:  Results from last 7 days   Lab Units 01/02/25  0453 01/01/25  2145   ALBUMIN g/dL 3.5 3.5   BILIRUBIN mg/dL 0.4 0.6   ALK PHOS U/L 142* 129*   AST (SGOT) U/L 136* 120*   ALT (SGPT) U/L 71* 60*       Medications :     sodium chloride, 10 mL, Intravenous, Q12H      dextrose 5 % and sodium chloride 0.45 %, 125 mL/hr  dextrose 5 % and sodium chloride 0.45 %, 100 mL/hr, Last Rate: 100 mL/hr (01/02/25 0727)  dextrose 5 % and sodium chloride 0.9 %, 125 mL/hr  fentanyl 10 mcg/mL,  mcg/hr  insulin, 0-100 Units/hr, Last Rate: 7.2 Units/hr (01/02/25 0721)  midazolam, 1-10 mg/hr, Last Rate: 10 mg/hr (01/02/25 0630)  norepinephrine, 0.02-0.3 mcg/kg/min (Dosing Weight), Last Rate: 0.1 mcg/kg/min (01/02/25 0740)  propofol, 5-50 mcg/kg/min (Dosing Weight), Last Rate: Stopped (01/01/25 2310)  sodium chloride, 200 mL/hr  sodium chloride, 200 mL/hr          Assessment &  Plan     -Severe life-threatening hyperkalemia  - Septic shock likely due to severe sepsis  - Metabolic acidosis  - Acute kidney injury  - History of factor B Leyden deficiency  - Morbid obesity  - Type 2 diabetes mellitus with likely renal involvement    Patient is in oliguric ATN and a high risk for needing emergent dialysis.  Likely modality of choice would be CRRT given septic shock.    Persistent, recurrent hyperkalemia.  I have ordered Kayexalate 30 g along with lactulose 30 g to be given as soon as possible.  Meanwhile also ordered calcium gluconate 2 g as a membrane stabilizer and albuterol nebulization twice 30 minutes apart    Patient has been accepted at  and is expected to be transferred anytime if patient end up not being transferred will repeat BMP in 4 hours    Reviewed clinical notes, radiological data, labs.  Discussed the case in detail with primary team        Sandrine Hassan MD  01/02/25  08:12 EST

## 2025-01-02 NOTE — H&P
Pulmonary/Critical Care History and Physical Exam     LOS: 0 days   Patient Care Team:  Bladimir Calle PA-C as PCP - General (Physician Assistant)    Chief Complaint: AMS    Subjective     HPI:     Ms. Arzate is a 39 year old female with a pmhx of Afib, diabetes, h/o PE/DVT, Factor 5 Leiden mutation, GERD, HLD, h/o stroke and hypothyroidism who presented to [facility] with altered mental status and being difficult to arouse per family. Upon arrival to [facility], workup was notable for: negative respiratory panel, leukocytosis (WBC 13.32). ProBNP 5671, hgb 10.2-->8.4, lactate 2.9,  potassium 8.2, creatinine 2.85, BUN 40, glucose 481, and Procal 6.26. CT head without acute process. CT chest with small left pleural effusion, bibasliar consolidation and atelectasis. CT A/P without contrast showed severe enlargement of the liver, s/p left nephrectomy. Initial ABG showed pH 7.048, pCO2 75, pO2 54, HCO3 21. Patient was intubated. Repeat ABG without significant improvement (pH 7.018, pCO2 79.3, pO2 83.6, HCO3 20.4). Nephrology was consulted and ordered for patient to receive 20 units of Regular insulin IVP x 2 and initiation of an insulin gtt with improvement of potassium to 6.1. Patient also became hypotensive and required Levophed gtt. An UDS was ordered after admission and was positive for opioids. She was given two doses of Narcan on two separate occasions without a significant change in mentation. The decision was made to transfer the patient to our facility for ICU admission and higher level of care.    Time spent: 8 minutes. Electronically signed by YEIMI Aragon, 01/02/25, 11:12 AM EST.    Patient seen on arrival to the ICU.  She is unresponsive on mechanical ventilation and is unable to relay any history.  History per outside records as above.    History taken from: record    Past Medical History:   Diagnosis Date    A-fib     Abnormal ECG     Anemia     Anxiety     Asthma     Cancer     Ovarian     Depression     Diabetes mellitus     DVT (deep venous thrombosis)     Factor 5 Leiden mutation, heterozygous     Fibroid     GERD (gastroesophageal reflux disease)     Gout     H/O abdominal abscess     History of sepsis     History of transfusion     Hyperlipidemia     Hypothyroid     Kidney stone     Migraines     Neuropathy     Ovarian cancer 2021    Ovarian cyst     PE (pulmonary embolism)     Polycystic ovary syndrome     Preeclampsia     Rh incompatibility     Stroke     TIA (transient ischemic attack)     Urinary tract infection     Varicella        Past Surgical History:   Procedure Laterality Date    ABDOMINAL SURGERY      CARDIAC CATHETERIZATION       SECTION      CHOLECYSTECTOMY      COLONOSCOPY      ENDOSCOPY      EXTRACORPOREAL SHOCK WAVE LITHOTRIPSY (ESWL) Left 10/22/2021    Procedure: EXTRACORPOREAL SHOCKWAVE LITHOTRIPSY;  Surgeon: Milan Motley MD;  Location: [facility];  Service: Urology;  Laterality: Left;    HYSTERECTOMY  2017    ovarian ca    LITHOTRIPSY      NEPHRECTOMY Left 10/2022    RIGHT OOPHORECTOMY      URETEROSCOPY LASER LITHOTRIPSY WITH STENT INSERTION Left 10/01/2021    Procedure: URETEROSCOPY WITH STENT PLACEMENT;  Surgeon: Milan Motley MD;  Location: [facility];  Service: Urology;  Laterality: Left;    URETEROSCOPY LASER LITHOTRIPSY WITH STENT INSERTION Left 2022    Procedure: URETEROSCOPY STENT REMOVAL;  Surgeon: Milan Motley MD;  Location: [facility];  Service: Urology;  Laterality: Left;    URETEROSCOPY LASER LITHOTRIPSY WITH STENT INSERTION Left 2022    Procedure: CYSTOSCOPY RETROGRADE LEFT WITH STENT PLACEMENT;  Surgeon: Milan Motley MD;  Location: [facility];  Service: Urology;  Laterality: Left;       Family History   Problem Relation Age of Onset    Hypertension Mother     Diabetes Father     Hypertension Father     Heart attack Father     No Known Problems Sister     No Known Problems Brother     No Known  "Problems Daughter     No Known Problems Son     No Known Problems Maternal Grandmother     No Known Problems Paternal Grandmother     Breast cancer Paternal Aunt     No Known Problems Maternal Grandfather     No Known Problems Paternal Grandfather     No Known Problems Cousin     Rheum arthritis Neg Hx     Osteoarthritis Neg Hx     Asthma Neg Hx     Heart failure Neg Hx     Hyperlipidemia Neg Hx     Migraines Neg Hx     Rashes / Skin problems Neg Hx     Seizures Neg Hx     Stroke Neg Hx     Thyroid disease Neg Hx        Social History     Socioeconomic History    Marital status:    Tobacco Use    Smoking status: Never     Passive exposure: Never    Smokeless tobacco: Never   Vaping Use    Vaping status: Never Used   Substance and Sexual Activity    Alcohol use: No    Drug use: Never     Comment: Pt has track marks on right arm. Pt states \"It's from my INR being drawn.\"     Sexual activity: Not Currently     Partners: Male     Birth control/protection: None       Allergies   Allergen Reactions    Salvia Officinalis Itching, Other (See Comments) and Shortness Of Breath    Shellfish Allergy Anaphylaxis    Toradol [Ketorolac Tromethamine] Anaphylaxis and Hives    Tree Nut Anaphylaxis and Shortness Of Breath     WALNUTS    Haldol [Haloperidol] Hives and Mental Status Change    Tramadol Hives and Swelling    Amoxicillin Hives and Rash    Penicillins Hives and Rash       Past Medical History/Family History/Social History were reviewed by me and updates were made.     Review of Systems:    Review of 14 systems was completed with positives and pertinent negatives noted in the subjective section.  All other systems reviewed and are negative.         Objective     Vital Signs  Temp:  [98.2 °F (36.8 °C)-104 °F (40 °C)] 100.1 °F (37.8 °C)  Heart Rate:  [] 108  Resp:  [20-35] 22  BP: ()/(38-88) 121/51  Arterial Line BP: (101-149)/(52-85) 103/63  FiO2 (%):  [70 %-100 %] 70 %  There is no height or weight on " file to calculate BMI.  Mode: VC+/AC  FiO2 (%):  [70 %-100 %] 70 %  S RR:  [16-26] 22  S VT:  [350 mL-450 mL] 385 mL  PEEP/CPAP (cm H2O):  [5 cm H20-14 cm H20] 14 cm H20  MAP (cm H2O):  [10-20] 18    Intake/Output Summary (Last 24 hours) at 1/2/2025 1953  Last data filed at 1/2/2025 1808  Gross per 24 hour   Intake 1017.42 ml   Output 1700 ml   Net -682.58 ml      Physical Exam:     General Appearance:  Sedated on ventilator, in no acute distress   Head:    Normocephalic, without obvious abnormality, atraumatic   Eyes:            Lids and lashes normal, conjunctivae and sclerae normal,    no icterus, no pallor, corneas clear, PERRL   ENMT:   Ears appear intact with no abnormalities noted    Oral endotracheal tube in place   Neck:   No adenopathy, supple, trachea midline, no thyromegaly,      no carotid bruit, no JVD   Lungs/resp:   On ventilator, symmetric chest rise, no crepitus, bilateral rhonchi, no chest wall tenderness                  Heart/CV:    Regular rhythm and normal rate, normal S1 and S2, no         murmur   Abdomen/GI:   Obese, soft, nontender, nondistended   G/U:     Deferred   Extremities/MSK:   No clubbing, cyanosis or edema.   No deformities.    Pulses:   Pulses palpable and equal bilaterally   Skin:   No bleeding, bruising or rash   Hem/Lymph:   No cervical or supraclavicular adenopathy.    Neurologic: Sedated.            Psychiatric: Sedated.     The above findings are documentation of my personal physical exam findings from today.   Electronically signed by:  Roly Garcia MD  01/02/25  19:53 EST     Results Review:     I reviewed the patient's new clinical results.   Results from last 7 days   Lab Units 01/02/25  1538 01/02/25  1314 01/02/25  0900 01/02/25  0453 01/01/25  2333 01/01/25  2145   SODIUM mmol/L 137 136 137 136   < > 133*   POTASSIUM mmol/L 5.7* 6.1* 6.1* 7.0*   < > 8.2*   CHLORIDE mmol/L 100 100 100 101   < > 98   CO2 mmol/L 22.0 21.0* 22.6 22.3   < > 19.5*   BUN mg/dL 37* 39*  40* 41*   < > 40*   CREATININE mg/dL 1.61* 1.69* 2.39* 2.50*   < > 2.85*   CALCIUM mg/dL 8.7 8.8 8.1* 8.4*   < > 8.3*   BILIRUBIN mg/dL  --   --  0.3 0.4  --  0.6   ALK PHOS U/L  --   --  137* 142*  --  129*   ALT (SGPT) U/L  --   --  71* 71*  --  60*   AST (SGOT) U/L  --   --  119* 136*  --  120*   GLUCOSE mg/dL 373* 377* 315* 314*   < > 481*    < > = values in this interval not displayed.     Results from last 7 days   Lab Units 01/02/25  1314 01/02/25  0900 01/01/25  2039   WBC 10*3/mm3 9.71 10.48 13.32*   HEMOGLOBIN g/dL 8.5* 8.4* 9.3*   HEMATOCRIT % 28.0* 28.1* 31.2*   PLATELETS 10*3/mm3 222 232 252     Results from last 7 days   Lab Units 01/02/25  1514   PH, ARTERIAL pH units 7.221*   PO2 ART mm Hg 98.1   PCO2, ARTERIAL mm Hg 59.8*   HCO3 ART mmol/L 24.5       I reviewed the patient's new imaging including images and reports.    CT chest 1/2/2025 shows bibasilar airspace opacities and cardiomegaly.  There is a small left pleural effusion.    CT abdomen and pelvis shows enlarged liver.    Radiologist impression follows:    IMPRESSION:     CT CHEST:  BIBASILAR PROBABLE PNEUMONIA AND ATELECTASIS WITH SMALL LEFT PLEURAL  EFFUSION.     CT ABDOMEN AND PELVIS:  SEVERELY ENLARGED LIVER WITH FATTY INFILTRATION.  STATUS POST LEFT NEPHRECTOMY.    CT head 1/2/2025:    IMPRESSION:     NO ACUTE INTRACRANIAL ABNORMALITY.       Medication Review:   albumin human, 25 g, Intravenous, BID  cefepime, 2,000 mg, Intravenous, Q12H  chlorhexidine, 15 mL, Mouth/Throat, Q12H  famotidine, 20 mg, Intravenous, BID  metroNIDAZOLE, 500 mg, Intravenous, Q8H  mupirocin, 1 Application, Each Nare, BID  senna-docusate sodium, 2 tablet, Nasogastric, BID  sodium chloride, 10 mL, Intravenous, Q12H  sodium zirconium cyclosilicate, 10 g, Nasogastric, TID  [START ON 1/3/2025] vancomycin (dosing per levels), , Not Applicable, Daily      amiodarone, 1 mg/min, Last Rate: 1 mg/min (01/02/25 1731)   Followed by  amiodarone, 0.5 mg/min  fentanyl 10 mcg/mL,   mcg/hr, Last Rate: 100 mcg/hr (01/02/25 1808)  heparin, 9 Units/kg/hr, Last Rate: 9 Units/kg/hr (01/02/25 1543)  insulin, 0-100 Units/hr, Last Rate: 11 Units/hr (01/02/25 1904)  Midazolam 1 mg/mL 100mL NS, 1-10 mg/hr, Last Rate: 10 mg/hr (01/02/25 1705)  norepinephrine, 0.02-0.3 mcg/kg/min, Last Rate: 0.06 mcg/kg/min (01/02/25 1730)  Pharmacy to Dose Heparin,   Pharmacy to dose vancomycin,         Assessment & Plan     Active Hospital Problems    Diagnosis     **Acute respiratory failure with hypercapnia     Hyperkalemia     Sepsis     Respiratory acidosis with metabolic acidosis     Acute respiratory failure     Type 2 diabetes mellitus with hyperglycemia     MARIAA (acute kidney injury)      39 y.o. female with history of HLD, hypothyroidism, atrial fibrillation, history of PE/DVT, factor V Leiden mutation, stroke, presented to [facility] with AMS and difficult to arouse.  Evaluation there showed significant renal failure, hyperglycemia, hypercapnic respiratory failure, and basilar pulmonary infiltrates concerning for pneumonia.  She was intubated for respiratory failure and placed on pressors for hypotension.  She was treated with shifting agents for hyperkalemia.  She was given Narcan without significant response.  She was subsequently transferred to [facility] ICU for ongoing management.    Plan:  1.  For acute mixed hypoxemic and hypercapnic respiratory failure: Continue mechanical ventilation.  Adjust settings to optimize ventilation and oxygenation and minimize barotrauma.  2.  For pulmonary infiltrates/basilar pneumonia unknown organism: Worrisome for aspiration.  Follow-up cultures.  Broad-spectrum antibiotics with cefepime plus Flagyl and vancomycin.  3.  For MARIAA: Consult nephrology.  Fluid resuscitation.  May need HD.  4.  For hyperkalemia: Status post shifting agents.  Monitor with resuscitation.  5.  For poorly controlled T2DM: Glucose monitoring and insulin drip protocol.  6.  For sepsis: Broad-spectrum  antibiotics as above.  Pressors if needed.  Repeat lactic acid level.  7.  For history of atrial fibrillation/DVT/PE/factor V Leiden: Heparin drip for now but if hemoglobin stable transition to home Eliquis.  8.  For chronic anemia: Monitor.  Transfuse if necessary.    Patient is critically ill secondary multisystem organ failure and has high risk of life-threatening decline in condition.  As such, she requires continuous monitoring frequent reassessment for consideration of adjustment management in order to minimize that risk.  I personally reassessed her multiple times today.    Critical care time : 44 minutes spent by me personally.(This excludes time spent performing separately reportable procedures and services). Critical care time includes high complexity decision making to assess, manipulate, and support vital organ system failure in this individual who has impairment of one or more vital organ systems such that there is a high probability of imminent or life threatening deterioration in the patient’s condition.    Electronically signed by:  Roly Garcia MD  01/02/25  19:53 EST        Please note that portions of this note were completed with [employer] - a voice recognition program.

## 2025-01-02 NOTE — CONSULTS
Referring Provider: radha Crump  Reason for Consultation: MARIAA     Subjective     Chief complaint AMS     History of present illness: This is 39 year old female with past medical hx of DM type II, hx of DKA in past, gout, Factor V leiden deficiency, HTN, HLD, Non functioning left kidney s/p nephrectomy  who presented to OSH for AMS . She was found by Family member unresponsive and EMS was called. At ED she was found to have Potassium of 8.2, Scr 2.85 BUN 40, glucose 481. ABG showing pH of 7.0. She was given insulin to correct hyperglycemia and hyperkalemia. Her baseline Scr has been around 1.0. She was hypotensive so started on Levophed and insulin drip was also started. She was subsequently intubated and placed on ventilator for respiratory distress. Urine drug screen was positive for opioids and narcan was administered. Transferred to our facility for higher level of care. Nephrology service has been consulted for MARIAA management     History  Past Medical History:   Diagnosis Date    A-fib     Abnormal ECG     Anemia     Anxiety     Asthma     Cancer     Ovarian    Depression     Diabetes mellitus     DVT (deep venous thrombosis)     Factor 5 Leiden mutation, heterozygous     Fibroid     GERD (gastroesophageal reflux disease)     Gout     H/O abdominal abscess     History of sepsis     History of transfusion     Hyperlipidemia     Hypothyroid     Kidney stone     Migraines     Neuropathy     Ovarian cancer 2021    Ovarian cyst     PE (pulmonary embolism)     Polycystic ovary syndrome     Preeclampsia     Rh incompatibility     Stroke     TIA (transient ischemic attack)     Urinary tract infection     Varicella    ,   Past Surgical History:   Procedure Laterality Date    ABDOMINAL SURGERY      CARDIAC CATHETERIZATION       SECTION      CHOLECYSTECTOMY      COLONOSCOPY      ENDOSCOPY      EXTRACORPOREAL SHOCK WAVE LITHOTRIPSY (ESWL) Left 10/22/2021    Procedure: EXTRACORPOREAL SHOCKWAVE  LITHOTRIPSY;  Surgeon: Milan Motley MD;  Location: [facility] OR;  Service: Urology;  Laterality: Left;    HYSTERECTOMY  2017    ovarian ca    LITHOTRIPSY      NEPHRECTOMY Left 10/2022    RIGHT OOPHORECTOMY      URETEROSCOPY LASER LITHOTRIPSY WITH STENT INSERTION Left 10/01/2021    Procedure: URETEROSCOPY WITH STENT PLACEMENT;  Surgeon: Milan Motley MD;  Location: [facility] OR;  Service: Urology;  Laterality: Left;    URETEROSCOPY LASER LITHOTRIPSY WITH STENT INSERTION Left 04/27/2022    Procedure: URETEROSCOPY STENT REMOVAL;  Surgeon: Milan Motley MD;  Location: [facility] OR;  Service: Urology;  Laterality: Left;    URETEROSCOPY LASER LITHOTRIPSY WITH STENT INSERTION Left 06/29/2022    Procedure: CYSTOSCOPY RETROGRADE LEFT WITH STENT PLACEMENT;  Surgeon: Milan Motley MD;  Location: [facility] OR;  Service: Urology;  Laterality: Left;   ,   Family History   Problem Relation Age of Onset    Hypertension Mother     Diabetes Father     Hypertension Father     Heart attack Father     No Known Problems Sister     No Known Problems Brother     No Known Problems Daughter     No Known Problems Son     No Known Problems Maternal Grandmother     No Known Problems Paternal Grandmother     Breast cancer Paternal Aunt     No Known Problems Maternal Grandfather     No Known Problems Paternal Grandfather     No Known Problems Cousin     Rheum arthritis Neg Hx     Osteoarthritis Neg Hx     Asthma Neg Hx     Heart failure Neg Hx     Hyperlipidemia Neg Hx     Migraines Neg Hx     Rashes / Skin problems Neg Hx     Seizures Neg Hx     Stroke Neg Hx     Thyroid disease Neg Hx    ,   Social History     Socioeconomic History    Marital status:    Tobacco Use    Smoking status: Never     Passive exposure: Never    Smokeless tobacco: Never   Vaping Use    Vaping status: Never Used   Substance and Sexual Activity    Alcohol use: No    Drug use: Never     Comment: Pt has track marks on right arm. Pt  "states \"It's from my INR being drawn.\"     Sexual activity: Not Currently     Partners: Male     Birth control/protection: None     E-cigarette/Vaping    E-cigarette/Vaping Use Never User      E-cigarette/Vaping Substances    Nicotine No     THC No     CBD No     Flavoring No      E-cigarette/Vaping Devices    Disposable No     Pre-filled or Refillable Cartridge No     Refillable Tank No     Pre-filled Pod No          ,   Medications Prior to Admission   Medication Sig Dispense Refill Last Dose/Taking    allopurinol (ZYLOPRIM) 100 MG tablet Take 1 tablet by mouth Daily. 30 tablet 3     amitriptyline (ELAVIL) 25 MG tablet Take 1 tablet by mouth every night at bedtime.       amLODIPine (NORVASC) 10 MG tablet Take 1 tablet by mouth Daily.       azelastine (ASTELIN) 0.1 % nasal spray 2 sprays both nostrils twice daily as needed 1 each 3     Baclofen (LIORESAL) 5 MG tablet Take 1 tablet by mouth Every 12 (Twelve) Hours.       cholecalciferol (VITAMIN D3) 1.25 MG (53649 UT) capsule Take 1 capsule by mouth Every 7 (Seven) Days.       clopidogrel (PLAVIX) 75 MG tablet Take 1 tablet by mouth Daily. 30 tablet 3     Continuous Blood Gluc Sensor (Dexcom G6 Sensor) Every 10 (Ten) Days. 9 each 3     cyanocobalamin ([employer] Vitamin B-12) 2000 MCG tablet Take 1 tablet by mouth Daily. 30 tablet 2     DULoxetine (CYMBALTA) 30 MG capsule Take 1 capsule by mouth 2 (Two) Times a Day. 60 capsule 2     Enoxaparin Sodium (LOVENOX) 120 MG/0.8ML solution prefilled syringe syringe Inject 0.8 mL under the skin into the appropriate area as directed Every 12 (Twelve) Hours. 22.4 mL 0     fenofibrate (TRICOR) 145 MG tablet Take 1 tablet by mouth Daily. 90 tablet 1     fluticasone (FLONASE) 50 MCG/ACT nasal spray Administer 2 sprays into the nostril(s) as directed by provider Daily.       gabapentin (NEURONTIN) 300 MG capsule TAKE ONE CAPSULE BY MOUTH ONCE NIGHTLY AS NEEDED FOR NEUROPATHY (Patient taking differently: Take 1 capsule by mouth 3 (Three) " Times a Day.) 30 capsule 0     glucose blood test strip 1 each by Other route 3 (Three) Times a Day. 100 each 5     Insulin Pen Needle 30G X 8 MM misc 1 Device 4 (Four) Times a Day. 200 each 0     Insulin Regular Human, Conc, (HumuLIN R) 500 UNIT/ML solution pen-injector CONCENTRATED injection Inject  under the skin into the appropriate area as directed.       ipratropium-albuterol (DUO-NEB) 0.5-2.5 mg/3 ml nebulizer Take 3 mL by nebulization Every 4 (Four) Hours As Needed for Shortness of Air. 150 mL 2     isosorbide mononitrate (IMDUR) 60 MG 24 hr tablet Take 1 tablet by mouth Every Morning. 30 tablet 3     Lancets Micro Thin 33G misc 1 Device 3 (Three) Times a Day. 100 each 3     lisinopril (PRINIVIL,ZESTRIL) 10 MG tablet Take 1 tablet by mouth Daily.       metoprolol succinate XL (TOPROL-XL) 50 MG 24 hr tablet Take 1 tablet by mouth Daily. (Patient taking differently: Take 1 tablet by mouth 2 (Two) Times a Day.) 30 tablet 0     mometasone-formoterol (DULERA 200) 200-5 MCG/ACT inhaler 2 puffs twice daily 1 g 2     montelukast (SINGULAIR) 10 MG tablet Take 1 tablet by mouth Every Night. 30 tablet 3     oxyCODONE (ROXICODONE) 10 MG tablet Take 1 tablet by mouth.       pantoprazole (PROTONIX) 40 MG EC tablet Take 1 tablet by mouth Daily.       promethazine (PHENERGAN) 25 MG tablet Take 1 tablet by mouth Every 12 (Twelve) Hours As Needed for Nausea. 20 tablet 0     promethazine-dextromethorphan (PROMETHAZINE-DM) 6.25-15 MG/5ML syrup Take 5 mL by mouth 4 (Four) Times a Day As Needed.       Respiratory Therapy Supplies (Nebulizer/Tubing/Mouthpiece) kit 1 each Take As Directed. 1 each 1     rosuvastatin (CRESTOR) 20 MG tablet Take 1 tablet by mouth Every Night. (Patient taking differently: Take 2 tablets by mouth Every Night.) 30 tablet 5     tamsulosin (FLOMAX) 0.4 MG capsule 24 hr capsule Take 1 capsule by mouth Daily. 30 capsule 5     topiramate (TOPAMAX) 25 MG tablet Take 1 tablet by mouth Daily. 30 tablet 0      Ventolin  (90 Base) MCG/ACT inhaler INHALE TWO PUFFS BY MOUTH EVERY 4 HOURS AS NEEDED FOR BREATHING 18 g 3    , Scheduled Meds:  cefepime, 2,000 mg, Intravenous, Q12H  chlorhexidine, 15 mL, Mouth/Throat, Q12H  famotidine, 20 mg, Intravenous, BID  mupirocin, 1 Application, Each Nare, BID  senna-docusate sodium, 2 tablet, Nasogastric, BID  sodium chloride, 10 mL, Intravenous, Q12H  sodium zirconium cyclosilicate, 10 g, Nasogastric, TID   , Continuous Infusions:  fentanyl 10 mcg/mL,  mcg/hr, Last Rate: 50 mcg/hr (01/02/25 1232)  heparin, 9 Units/kg/hr  insulin, 0-100 Units/hr, Last Rate: 10.1 Units/hr (01/02/25 1417)  Midazolam 1 mg/mL 100mL NS, 1-10 mg/hr, Last Rate: 10 mg/hr (01/02/25 1232)  norepinephrine, 0.02-0.3 mcg/kg/min, Last Rate: 0.06 mcg/kg/min (01/02/25 1317)  Pharmacy to Dose Heparin,   Pharmacy to dose vancomycin,    , PRN Meds:    senna-docusate sodium **AND** polyethylene glycol **AND** [DISCONTINUED] bisacodyl **AND** bisacodyl    dextrose    dextrose    fentaNYL    glucagon (human recombinant)    ipratropium    ipratropium-albuterol    Pharmacy to Dose Heparin    Pharmacy to dose vancomycin    sodium chloride    sodium chloride, and Allergies:  Salvia officinalis, Shellfish allergy, Toradol [ketorolac tromethamine], Tree nut, Haldol [haloperidol], Tramadol, Amoxicillin, and Penicillins    Review of Systems  Unable to perform secondary to patient factor       Objective     Vital Signs  Temp:  [98.2 °F (36.8 °C)-104 °F (40 °C)] 98.4 °F (36.9 °C)  Heart Rate:  [] 101  Resp:  [20-35] 20  BP: ()/(38-88) 110/46  Arterial Line BP: (120-149)/(61-85) 143/62  FiO2 (%):  [80 %-100 %] 100 %    I/O this shift:  In: 404 [I.V.:304; Other:100]  Out: 950 [Urine:950]  No intake/output data recorded.    Physical Exam:  Gen: intubated and sedated.    HENT: NC, AT  NECK: Supple, ETT in place  LUNGS: Coarse breath sound on vent, non labored respirtation   CVS: S1/S2 audible, RRR, no murmur   Abd:  "Soft, NT, ND, BS+   Ext: Trace edema, no cyanosis   CNS: Intubated and sedated.   Psy: Intubated and sedated.   Skin: Warm, dry and intact      Results Review:   I reviewed the patient's new clinical results.    WBC WBC   Date Value Ref Range Status   01/02/2025 9.71 3.40 - 10.80 10*3/mm3 Final   01/02/2025 10.48 3.40 - 10.80 10*3/mm3 Final   01/01/2025 13.32 (H) 3.40 - 10.80 10*3/mm3 Final   12/31/2024 6 3.70 - 10.30 10*3/uL Final      HGB Hemoglobin   Date Value Ref Range Status   01/02/2025 8.5 (L) 12.0 - 15.9 g/dL Final   01/02/2025 8.4 (L) 12.0 - 15.9 g/dL Final   01/01/2025 9.3 (L) 12.0 - 15.9 g/dL Final   12/31/2024 10.2 (L) 11.2 - 15.7 g/dL Final      HCT Hematocrit   Date Value Ref Range Status   01/02/2025 28.0 (L) 34.0 - 46.6 % Final   01/02/2025 28.1 (L) 34.0 - 46.6 % Final   01/01/2025 31.2 (L) 34.0 - 46.6 % Final   12/31/2024 31.9 (L) 34.0 - 45.0 % Final      Platlets No results found for: \"LABPLAT\"   MCV MCV   Date Value Ref Range Status   01/02/2025 95.6 79.0 - 97.0 fL Final   01/02/2025 97.6 (H) 79.0 - 97.0 fL Final   01/01/2025 96.9 79.0 - 97.0 fL Final   12/31/2024 91 79 - 98 fL Final          Sodium Sodium   Date Value Ref Range Status   01/02/2025 136 136 - 145 mmol/L Final   01/02/2025 137 136 - 145 mmol/L Final   01/02/2025 136 136 - 145 mmol/L Final   01/02/2025 136 136 - 145 mmol/L Final   01/01/2025 136 136 - 145 mmol/L Final   01/01/2025 133 (L) 136 - 145 mmol/L Final      Potassium Potassium   Date Value Ref Range Status   01/02/2025 6.1 (C) 3.5 - 5.2 mmol/L Final   01/02/2025 6.1 (C) 3.5 - 5.2 mmol/L Final   01/02/2025 7.0 (C) 3.5 - 5.2 mmol/L Final     Comment:     Slight hemolysis detected by analyzer. Result may be falsely elevated.   01/02/2025 6.6 (C) 3.5 - 5.2 mmol/L Final   01/01/2025 7.2 (C) 3.5 - 5.2 mmol/L Final     Comment:     Slight hemolysis detected by analyzer. Result may be falsely elevated.   01/01/2025 8.2 (C) 3.5 - 5.2 mmol/L Final      Chloride Chloride   Date Value " "Ref Range Status   01/02/2025 100 98 - 107 mmol/L Final   01/02/2025 100 98 - 107 mmol/L Final   01/02/2025 101 98 - 107 mmol/L Final   01/02/2025 99 98 - 107 mmol/L Final   01/01/2025 101 98 - 107 mmol/L Final   01/01/2025 98 98 - 107 mmol/L Final      CO2 CO2   Date Value Ref Range Status   01/02/2025 21.0 (L) 22.0 - 29.0 mmol/L Final   01/02/2025 22.6 22.0 - 29.0 mmol/L Final   01/02/2025 22.3 22.0 - 29.0 mmol/L Final   01/02/2025 20.4 (L) 22.0 - 29.0 mmol/L Final   01/01/2025 19.5 (L) 22.0 - 29.0 mmol/L Final   01/01/2025 19.5 (L) 22.0 - 29.0 mmol/L Final      BUN BUN   Date Value Ref Range Status   01/02/2025 39 (H) 6 - 20 mg/dL Final   01/02/2025 40 (H) 6 - 20 mg/dL Final   01/02/2025 41 (H) 6 - 20 mg/dL Final   01/02/2025 41 (H) 6 - 20 mg/dL Final   01/01/2025 40 (H) 6 - 20 mg/dL Final   01/01/2025 40 (H) 6 - 20 mg/dL Final      Creatinine Creatinine   Date Value Ref Range Status   01/02/2025 1.69 (H) 0.57 - 1.00 mg/dL Final   01/02/2025 2.39 (H) 0.57 - 1.00 mg/dL Final   01/02/2025 2.50 (H) 0.57 - 1.00 mg/dL Final   01/02/2025 3.02 (H) 0.57 - 1.00 mg/dL Final   01/01/2025 2.88 (H) 0.57 - 1.00 mg/dL Final   01/01/2025 2.85 (H) 0.57 - 1.00 mg/dL Final      Calcium Calcium   Date Value Ref Range Status   01/02/2025 8.8 8.6 - 10.5 mg/dL Final   01/02/2025 8.1 (L) 8.6 - 10.5 mg/dL Final   01/02/2025 8.4 (L) 8.6 - 10.5 mg/dL Final   01/02/2025 8.4 (L) 8.6 - 10.5 mg/dL Final   01/01/2025 8.2 (L) 8.6 - 10.5 mg/dL Final   01/01/2025 8.3 (L) 8.6 - 10.5 mg/dL Final      PO4 No results found for: \"CAPO4\"   Albumin Albumin   Date Value Ref Range Status   01/02/2025 3.4 (L) 3.5 - 5.2 g/dL Final   01/02/2025 3.5 3.5 - 5.2 g/dL Final   01/01/2025 3.5 3.5 - 5.2 g/dL Final      Magnesium Magnesium   Date Value Ref Range Status   01/02/2025 1.7 1.6 - 2.6 mg/dL Final   01/02/2025 1.8 1.6 - 2.6 mg/dL Final   01/02/2025 2.0 1.6 - 2.6 mg/dL Final   01/01/2025 1.5 (L) 1.6 - 2.6 mg/dL Final      Uric Acid No results found for: " "\"URICACID\"     Results from last 7 days   Lab Units 01/02/25  1314 01/02/25  0900 01/02/25  0453 01/02/25  0051 01/01/25  2333 01/01/25  2145 01/01/25 2039 12/31/24  1440   SODIUM mmol/L 136 137 136 136   < > 133*  --   --    POTASSIUM mmol/L 6.1* 6.1* 7.0* 6.6*   < > 8.2*  --   --    CHLORIDE mmol/L 100 100 101 99   < > 98  --   --    CO2 mmol/L 21.0* 22.6 22.3 20.4*   < > 19.5*  --   --    BUN mg/dL 39* 40* 41* 41*   < > 40*  --   --    CREATININE mg/dL 1.69* 2.39* 2.50* 3.02*   < > 2.85*  --   --    CALCIUM mg/dL 8.8 8.1* 8.4* 8.4*   < > 8.3*  --   --    ALBUMIN g/dL  --  3.4* 3.5  --   --  3.5  --   --    WBC 10*3/mm3 9.71 10.48  --   --   --   --  13.32* 6   HEMOGLOBIN g/dL 8.5* 8.4*  --   --   --   --  9.3* 10.2*   PLATELETS 10*3/mm3 222 232  --   --   --   --  252 200    < > = values in this interval not displayed.       Intake/Output Summary (Last 24 hours) at 1/2/2025 1517  Last data filed at 1/2/2025 1417  Gross per 24 hour   Intake 404 ml   Output 950 ml   Net -546 ml           cefepime, 2,000 mg, Intravenous, Q12H  chlorhexidine, 15 mL, Mouth/Throat, Q12H  famotidine, 20 mg, Intravenous, BID  mupirocin, 1 Application, Each Nare, BID  senna-docusate sodium, 2 tablet, Nasogastric, BID  sodium chloride, 10 mL, Intravenous, Q12H  sodium zirconium cyclosilicate, 10 g, Nasogastric, TID      fentanyl 10 mcg/mL,  mcg/hr, Last Rate: 50 mcg/hr (01/02/25 1232)  insulin, 0-100 Units/hr, Last Rate: 10.1 Units/hr (01/02/25 1417)  Midazolam 1 mg/mL 100mL NS, 1-10 mg/hr, Last Rate: 10 mg/hr (01/02/25 1232)  norepinephrine, 0.02-0.3 mcg/kg/min, Last Rate: 0.06 mcg/kg/min (01/02/25 1317)    FINDINGS:   Right Kidney: The right kidney is normal in size and echogenicity measuring 8.11 cm.  There are no contour deforming masses or calculi.  There is no hydronephrosis.     Left Kidney: Surgically absent     Assessment & Plan       MARIAA (acute kidney injury)    Type 2 diabetes mellitus with hyperglycemia    Acute " respiratory failure with hypercapnia    Hyperkalemia    Sepsis    Respiratory acidosis with metabolic acidosis      1-MARIAA- non oliguric - Pre-renal azotemia vs sepsis related MARIAA. UA - RBC 3-5, + protein. 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 At home on lisinopril, metformin and topiramate. Improving with IV hydration and hemodynamic support.   2- Hyperkalemia - improving - with lokelma and bicarb infusion  3- Hyperglycemia   4- Anemia   5- Shock   6- hx of Factor V Leiden deficiency   7- hx of DM   8- Respiratory distress - suspicion of aspiration pneumonia   9- Hx of Gout     Plan:  - Tight glycemic control. Insulin drip   - IV hydration.   - Monitor I/O   - Avoid nephrotoxic agents.   - Renal diet   - Adjust meds per renal function   - No emergent need of RRT   - Monitor H/H and transfuse for Hgb less than 7.0   - Urine lytes     I discussed the patients findings and my recommendations with nursing staff and primary care team    Janessa Esparza MD  01/02/25

## 2025-01-02 NOTE — PROCEDURES
Insert Arterial Line    Date/Time: 1/2/2025 1:09 PM    Performed by: Beronica Crump APRN  Authorized by: Beronica Crump APRN    Universal Protocol:     Verbal consent obtained?: Yes      Risks and benefits: Risks, benefits and alternatives were discussed      Consent given by: Son.    Patient identity confirmed:  Arm band and hospital-assigned identification number    Time out: Immediately prior to the procedure a time out was called    A time out verifies correct patient, procedure, equipment, support staff and site/side marked as required:   Preparation:     Preparation: Patient was prepped and draped in usual sterile fashion    Indications:     Indications: multiple ABGs and hemodynamic monitoring    Location:     Location:  Right radial  Anesthesia:     Local anesthetic:  Lidocaine 1% without epinephrine    Patient sedated: Yes      Sedation:  See MAR for details    Analgesia:  See MAR for details    Vital signs: Vital signs monitored during sedation    Procedure Details:     Ultrasound Guidance: yes  The ultrasound was used for evaluation of possible access sites.  Vessel patency was confirmed with the ultrasound.  Needle entry into vessel was visualized in realtime with the ultrasound.   Permanent recorded image(s) of the vascular access site will be included in the patient record.      Apolinar's test normal?: Yes      Needle gauge:  20    Seldinger technique: Seldinger technique used      Number of attempts:  3  Post-procedure:     Post-procedure:  Line sutured and dressing applied    Post-procedure CMS: unable to assess, patient sedated.     patient tolerated the procedure well with no immediate complications     Electronically signed by YEIMI Aragon, 01/02/25, 1:10 PM EST.

## 2025-01-02 NOTE — PLAN OF CARE
Goal Outcome Evaluation:  Plan of Care Reviewed With: patient, child, parent        Progress: no change     -Pt arrived from [facility], intubated, FiO2 100%, PEEP 14, on fentanyl, versed, levo and insulin drip  -ABG acidotic, RR increased to 22, repeat ABG improved, K 6.1, 1 amp of bicarb and lokelma administered, K 5.7 on repeat BMP  -Heparin gtt started for HX of PE, MRSA detected , BC positive for gm + cocci in clusters in anaerobic bottle, continue cefepime and vancomycin  -Nephrology consulted, NO plan for dialysis, UOP 1.7 L, Bumex x 1 and albumin ordered  -Around 1700 rhythm changed to wide QRS tachycardia with fusion complexes, mag 2 gm and amio protocol started, converted to ST  - BUE restraints started, son called and updated.

## 2025-01-02 NOTE — LETTER
EMS Transport Request  For use at [facility], [place], [place], [place], and [place] only   Patient Name: Natalia Arzate : 1985   Weight:(!) 145 kg (319 lb 3.6 oz) Pick-up Location: [facility] ([facility]) BLS/ALS: BLS/ALS: BLS   Insurance: [employer] Auth End Date:    Pre-Cert #: D/C Summary complete:    Destination: Other [facility],  [facility], [facility]   Contact Precautions: Other contact   Equipment (O2, Fluids, etc.): None   Arrive By Date/Time: 25 afternoon Stretcher/WC: Stretcher   CM Requesting: Pallavi Donohue RN Ext: [phone]   Notes/Medical Necessity: impaired balance/coordination/endurance/sensory awareness, poor postural/trunk control, bacteremia, acute respiratory failure, MARIAA, severe dysphagia     ______________________________________________________________________    *Only 2 patient bags OR 1 carry-on size bag are permitted.  Wheelchairs and walkers CANNOT transported with the patient. Acknowledge: Yes

## 2025-01-02 NOTE — ED PROVIDER NOTES
Subjective   History of Present Illness  Patient is a morbidly obese 40-year-old female with numerous medical issues including atrial fibrillation, factor V Leiden with previous DVT/ PE, and history of ovarian cancer, and diabetes mellitus presents to the ER by EMS for difficult to arouse with concern for diabetic emergency.  Minimal to no history of present illness that has been provided to EMS.  Family is not present at bedside and patient does have a 20-year-old son that is at home that assists in taking care of his mother. EMS reported that patient had slept in the bed all day and upon her being difficult to arouse they called 911.              Review of Systems    Past Medical History:   Diagnosis Date    A-fib     Abnormal ECG     Anemia     Anxiety     Asthma     Cancer     Ovarian    Depression     Diabetes mellitus     DVT (deep venous thrombosis)     Factor 5 Leiden mutation, heterozygous     Fibroid     GERD (gastroesophageal reflux disease)     Gout     H/O abdominal abscess     History of sepsis     History of transfusion     Hyperlipidemia     Hypothyroid     Kidney stone     Migraines     Neuropathy     Ovarian cancer 02/2021    Ovarian cyst     PE (pulmonary embolism)     Polycystic ovary syndrome     Preeclampsia     Rh incompatibility     Stroke     TIA (transient ischemic attack)     Urinary tract infection     Varicella        Allergies   Allergen Reactions    Salvia Officinalis Itching, Other (See Comments) and Shortness Of Breath    Shellfish Allergy Anaphylaxis    Toradol [Ketorolac Tromethamine] Anaphylaxis and Hives    Tree Nut Anaphylaxis and Shortness Of Breath     WALNUTS    Haldol [Haloperidol] Hives and Mental Status Change    Tramadol Hives and Swelling    Amoxicillin Hives and Rash     Tolerated ampicillin graded-dose challenge w/o complications (1/4/25).    Penicillins Hives and Rash     Tolerated ampicillin graded-dose challenge w/o complications (1/4/25).       Past Surgical  History:   Procedure Laterality Date    ABDOMINAL SURGERY      CARDIAC CATHETERIZATION       SECTION      CHOLECYSTECTOMY      COLONOSCOPY      ENDOSCOPY      EXTRACORPOREAL SHOCK WAVE LITHOTRIPSY (ESWL) Left 10/22/2021    Procedure: EXTRACORPOREAL SHOCKWAVE LITHOTRIPSY;  Surgeon: Milan Motley MD;  Location: [facility];  Service: Urology;  Laterality: Left;    HYSTERECTOMY  2017    ovarian ca    LITHOTRIPSY      NEPHRECTOMY Left 10/2022    RIGHT OOPHORECTOMY      URETEROSCOPY LASER LITHOTRIPSY WITH STENT INSERTION Left 10/01/2021    Procedure: URETEROSCOPY WITH STENT PLACEMENT;  Surgeon: Milan Motley MD;  Location: [facility];  Service: Urology;  Laterality: Left;    URETEROSCOPY LASER LITHOTRIPSY WITH STENT INSERTION Left 2022    Procedure: URETEROSCOPY STENT REMOVAL;  Surgeon: Milan Motley MD;  Location: [facility];  Service: Urology;  Laterality: Left;    URETEROSCOPY LASER LITHOTRIPSY WITH STENT INSERTION Left 2022    Procedure: CYSTOSCOPY RETROGRADE LEFT WITH STENT PLACEMENT;  Surgeon: Milan Motley MD;  Location: [facility];  Service: Urology;  Laterality: Left;       Family History   Problem Relation Age of Onset    Hypertension Mother     Diabetes Father     Hypertension Father     Heart attack Father     No Known Problems Sister     No Known Problems Brother     No Known Problems Daughter     No Known Problems Son     No Known Problems Maternal Grandmother     No Known Problems Paternal Grandmother     Breast cancer Paternal Aunt     No Known Problems Maternal Grandfather     No Known Problems Paternal Grandfather     No Known Problems Cousin     Rheum arthritis Neg Hx     Osteoarthritis Neg Hx     Asthma Neg Hx     Heart failure Neg Hx     Hyperlipidemia Neg Hx     Migraines Neg Hx     Rashes / Skin problems Neg Hx     Seizures Neg Hx     Stroke Neg Hx     Thyroid disease Neg Hx        Social History     Socioeconomic History    Marital status:     Tobacco Use    Smoking status: Never     Passive exposure: Never    Smokeless tobacco: Never   Vaping Use    Vaping status: Never Used   Substance and Sexual Activity    Alcohol use: No    Drug use: Never    Sexual activity: Not Currently     Partners: Male     Birth control/protection: None           Objective   Physical Exam  Vitals and nursing note reviewed.   Constitutional:       General: She is in acute distress.      Appearance: She is well-developed. She is obese. She is ill-appearing and toxic-appearing.   HENT:      Head: Normocephalic and atraumatic.      Right Ear: External ear normal.      Left Ear: External ear normal.      Nose: Nose normal.   Eyes:      Conjunctiva/sclera: Conjunctivae normal.      Pupils: Pupils are equal, round, and reactive to light.      Comments: Pinpoint pupils   Neck:      Vascular: No JVD.      Trachea: No tracheal deviation.   Cardiovascular:      Rate and Rhythm: Normal rate and regular rhythm.      Heart sounds: Normal heart sounds. No murmur heard.  Pulmonary:      Effort: No respiratory distress.      Breath sounds: No wheezing.      Comments: Shallow respiratory effort with failure to protect airway.  Diminished breath sounds with almost absent breath sounds in the left lower lung field.  Abdominal:      General: Bowel sounds are normal.      Palpations: Abdomen is soft.      Tenderness: There is no abdominal tenderness.   Musculoskeletal:         General: No deformity.      Cervical back: Normal range of motion and neck supple.   Skin:     General: Skin is warm and dry.      Coloration: Skin is not pale.      Findings: No erythema or rash.   Neurological:      GCS: GCS eye subscore is 2. GCS verbal subscore is 1. GCS motor subscore is 2.      Cranial Nerves: No cranial nerve deficit.      Comments: Patient is lethargic and difficult to arouse.  Does not follow commands is not answering questions.         Critical Care    Performed by: Sherry Reddy    Authorized by: Sherry Reddy DO    Critical care provider statement:     Critical care time (minutes):  120    Critical care time was exclusive of:  Separately billable procedures and treating other patients and teaching time    Critical care was necessary to treat or prevent imminent or life-threatening deterioration of the following conditions:  Respiratory failure, sepsis, cardiac failure, circulatory failure, metabolic crisis, endocrine crisis, dehydration, CNS failure or compromise, hepatic failure, renal failure, shock and toxidrome    Critical care was time spent personally by me on the following activities:  Blood draw for specimens, development of treatment plan with patient or surrogate, evaluation of patient's response to treatment, examination of patient, interpretation of cardiac output measurements, obtaining history from patient or surrogate, ordering and review of laboratory studies, ordering and performing treatments and interventions, ordering and review of radiographic studies, pulse oximetry, re-evaluation of patient's condition, review of old charts, vascular access procedures, ventilator management and discussions with consultants    I assumed direction of critical care for this patient from another provider in my specialty: no      Care discussed with: accepting provider at another facility    Intubation    Date/Time: 1/1/2025 8:04 PM    Performed by: Pedro Castellon DO  Authorized by: Sherry Reddy DO    Consent:     Consent obtained:  Emergent situation    Consent given by:  Healthcare agent  Universal protocol:     Patient identity confirmed:  Arm band and hospital-assigned identification number  Pre-procedure details:     Indications: airway protection, altered consciousness and respiratory failure      Patient status:  Altered mental status    Mouth opening - incisor distance:  3 or more finger widths    Mallampati score:  III    Obstruction: none      Pharmacologic strategy: RSI       Induction agents:  Etomidate    Paralytics:  Succinylcholine  Procedure details:     Preoxygenation:  Bag valve mask    CPR in progress: no    Successful intubation attempt details:     Intubation method:  Oral    Intubation technique: direct      Laryngoscope blade:  Mac 4    Grade view: I      Tube size (mm):  7.5    Tube type:  Cuffed    Tube visualized through cords: yes    Placement assessment:     ETT at teeth/gumline (cm):  22    Tube secured with:  ETT hein    Breath sounds:  Equal and absent over the epigastrium    Placement verification: chest rise, colorimetric ETCO2, CXR verification, direct visualization, equal breath sounds and tube exhalation      CXR findings:  Appropriate position  Post-procedure details:     Procedure completion:  Tolerated well, no immediate complications  Central Line At Bedside    Date/Time: 1/1/2025 8:11 PM    Performed by: Pedro Castellon DO  Authorized by: Sherry Reddy DO    Consent:     Consent obtained:  Emergent situation    Consent given by:  Healthcare agent  Universal protocol:     Patient identity confirmed:  Arm band and hospital-assigned identification number  Pre-procedure details:     Indication(s): central venous access and insufficient peripheral access      Hand hygiene: Hand hygiene performed prior to insertion      Sterile barrier technique: All elements of maximal sterile technique followed      Skin preparation:  Chlorhexidine    Skin preparation agent: Skin preparation agent completely dried prior to procedure    Anesthesia:     Anesthesia method:  Local infiltration  Procedure details:     Location:  R internal jugular    Patient position:  Supine    Catheter size:  7 Fr    Landmarks identified: yes      Ultrasound guidance: yes      Ultrasound guidance timing: prior to insertion and real time      Sterile ultrasound techniques: Sterile gel and sterile probe covers were used      Number of attempts:  1    Successful placement: yes     Post-procedure details:     Post-procedure:  Dressing applied and line sutured    Assessment:  Blood return through all ports, free fluid flow, placement verified by x-ray and no pneumothorax on x-ray    Procedure completion:  Tolerated well, no immediate complications         CT Abdomen Pelvis Without Contrast   Final Result       CT CHEST:   BIBASILAR PROBABLE PNEUMONIA AND ATELECTASIS WITH SMALL LEFT PLEURAL   EFFUSION.       CT ABDOMEN AND PELVIS:   SEVERELY ENLARGED LIVER WITH FATTY INFILTRATION.   STATUS POST LEFT NEPHRECTOMY.       This report was finalized on 1/2/2025 6:53 AM by Marly Hogan MD.          CT Chest Without Contrast Diagnostic   Final Result       CT CHEST:   BIBASILAR PROBABLE PNEUMONIA AND ATELECTASIS WITH SMALL LEFT PLEURAL   EFFUSION.       CT ABDOMEN AND PELVIS:   SEVERELY ENLARGED LIVER WITH FATTY INFILTRATION.   STATUS POST LEFT NEPHRECTOMY.       This report was finalized on 1/2/2025 6:53 AM by Marly Hogan MD.          CT Head Without Contrast   Final Result       NO ACUTE INTRACRANIAL ABNORMALITY.               This report was finalized on 1/2/2025 6:48 AM by Marly Hogan MD.          XR Chest 1 View   Final Result       Satisfactory position of the tubes and lines.   Enteric drain in place with tip to the stomach.   Enlarged heart size.   Moderate size left pleural effusion.   Central pulmonary vascular congestion.   No pneumothorax.       This report was finalized on 1/2/2025 1:52 AM by Elian Ovalle MD.          XR Chest 1 View   Final Result       Enlarged heart size with central pulmonary vascular congestion.   Right neck central vascular catheter with tip to the SVC.   Endotracheal tube in place with tip 3 cm above the anna.   Enteric drain noted with tip terminating in the distal esophageal   segment.   The enteric drain does not extend into the gastric lumen.   Small to medium size left pleural effusion.   No pneumothorax.       This report was finalized on 1/1/2025  9:10 PM by Elian Ovalle MD.            ct imaging has been performed currently has not been read by radiology.  Patient is being transferred outside facility.    ED Course  ED Course as of 01/06/25 1840   Wed Jan 01, 2025 2004 Patient was given Narcan on arrival in the ER and intubated due to failure to protect airway and difficulty in respiratory effort.   [LK]   2025 Patient has evidence what appears to be multifocal pneumonia with a large focal consolidation in the left lower lobe.  Empirically with Vanco and cefepime patient is allergic to penicillins  Electronically signed by Sherry Reddy DO, 01/01/25, 8:26 PM EST.   [LK]   2108 CMP resulted with a potassium of 8 however EKG shows no evidence of hyperkalemia or no sinusoidal pattern.  CMP was canceled for questionable results and a repeat CMP will be drawn at this time.  Electronically signed by Sherry Reddy DO, 01/01/25, 9:08 PM EST.   [LK]   2133 A patient will be empirically treated as if she has DKA due to obvious systemic acidosis Yerger on arrival is greater than 500.    Is an extremely hard stick.  Repeat CMP will be obtained now from central line-but once again the initial CMP was pulled from the central line with a potassium of 8 which was instructed to lab to cancel due to concern for collection abnormality.     [LK]   2209 Patient has received a total of 1300 mL ns-      [LK]   2232 Spoke with Dr. Brooke    -Requested: specific orders     -20 units of IV insulin push AND start on insulin drip    1 g of calcium gluconate    1L NS bolus: Is aware that the patient previously had a total of 1300 mL and chest x-ray concerning for pulmonary edema    -Patient's sedation medications have been reduced as well as possible to maintain patient sedated but increased blood pressure currently 92/41.    , oiolkm,mn b    Blood sugar prior to initiating insulin push   485 mg/dL [LK]   Thu Jan 02, 2025   0025 Below he was at bedside for at least over an hour.   Feels that the patient would benefit for higher level of care concerned the patient is going to continue to decompensate and need CRRT.  Electronically signed by Sherry Reddy DO, 01/02/25, 12:25 AM EST.   [LK]   0104 Patient was positive for oxycodone as tricyclic antidepressants. [LK]   0152 Was accepted by Dr. Bella for [facility].    Also agrees that the patient needs CRRT.  Metabolic status remains critical patient has been hemodynamically stabilized to the best of our abilities prior to transfer.  Currently sinus rhythm heart rate 99 pressure 97/45 on Levophed 0.06 intubated on FiO2 100% with an SpO2 of 96, RR24 and overbreathing the vent at a rate of roughly 27.  Electronically signed by Sherry Reddy DO, 01/02/25, 1:53 AM EST.   [LK]   0155 At this time was 100.1 F.    Tylenol was ordered empirically as patient has continues to have increasing temperature initially at 98.2 on arrival.  Electronically signed by Sherry Reddy DO, 01/02/25, 1:56 AM EST.   [LK]   0158 Spoke with pharmacy and patient on D5 0.45%NS rate at 100 mL/h to prevent hypoglycemia as patient remains on insulin drip for severe hyperkalemia    Has been accepted to [facility] by Dr. Bella ICU level of care.  Electronically signed by Sherry Reddy DO, 01/02/25, 2:01 AM EST.   [LK]   0245 Patient currently is on FiO2 of 100% increased PEEP-12  Oxygen saturation did improve from 88 to 93%. [LK]   0251 Dr. Nelson called and spoke to patient's son; who is 20 years old and lives at home with patient         [LK]   0254 Patient's BNP is significantly elevated compared to what it was a month ago.  Cardiac enzymes have been ordered and a repeat BNP.    Family wants continued full care of this patient  Electronically signed by Sherry Reddy DO, 01/02/25, 2:54 AM EST.       [LK]   0307 Spoke with Dr. Reese.  Agreed that the patient did not need additional bicarb he feels that this is all driven by acidosis likely due to prolonged hypoxia and with  evidence of persistent hypercapnia.     [LK]   0355 At this point in time, due to weather no airflight [facility] can fly to [place] however   They can fly to [place].  Attempting to potentially transfer patient to [facility].  Electronically signed by Sherry Reddy DO, 01/02/25, 3:56 AM EST.   [LK]   0447 And actually has been accepted by Dr. Aparicio to ICU level of care at [facility] they state that they do have availability for CRRT.  Electronically signed by Sherry Reddy DO, 01/02/25, 4:47 AM EST.   [LK]   0500 Patient is now truly febrile at 101.6F   She did receive 1 g of Tylenol about 3 hours ago.    Patient has Toradol and promethazine listed as an allergy causing anaphylaxis patient is intubated and no family bedside to confirm allergies.  Electronically signed by Sherry Reddy DO, 01/02/25, 5:00 AM EST.      Patient will 650 mg of Tylenol will be administered to the patient at this time. [LK]   0503 Repeat blood sugar shows increase in glucose while on 100 mL of D5 half-normal saline.  Will use the rate to 50 mL/h.  Patient remains on continuous insulin drip.  Electronically signed by Sherry Reddy DO, 01/02/25, 5:04 AM EST.   [LK]   0622 [facility] will attempt to potentially see if they can transport the patient after shift change.  They are unsure if they will have the appropriate helicopter in order to accommodate patient's body measurements.  [facility] ultimately could not fly the patient neither could [facility].  -If patient cannot fly;  [facility] will take po\t  Electronically signed by Sherry Reddy DO, 01/02/25, 6:24 AM EST.   [LK]   0633 Patient is tachycardic significantly overbreathing the ventilator and now currently 104 point    Patient will be temporarily placed cooling mat  Patient is maxed out on Versed does not appear sedated enough as she is now become tachycardic potentially due to the fever but overbreathing the ventilator which she initially was not respiratory rate when  she was adequately sedated respiratory rate was roughly 27.  Electronically signed by Sherry Reddy DO, 01/02/25, 6:35 AM EST.    Fentanyl infusion has been ordered at this time currently on Levophed at 0.1 mcg/min    Patient has received in total of 150 mg of rocuronium with 100 mg and intubation additional 50 mg that patient required in order to appropriately get CT imaging.  Electronically signed by Sherry Reddy DO, 01/02/25, 6:41 AM EST.     [LK]   0736 Stable, transport en route  Electronically signed by Liu Fisher DO, 01/02/25, 7:36 AM EST.   [CW]      ED Course User Index  [CW] Liu Fisher DO  [LK] Sherry Reddy DO                                                       Medical Decision Making  Problems Addressed:  Acute renal failure, unspecified acute renal failure type: complicated acute illness or injury  Acute respiratory failure with hypercapnia: complicated acute illness or injury  Acute respiratory failure, unspecified whether with hypoxia or hypercapnia: complicated acute illness or injury  Adverse effect of oxycodone: complicated acute illness or injury  Fever in adult: complicated acute illness or injury  Hyperkalemia: complicated acute illness or injury  Hyperphosphatemia: complicated acute illness or injury  Morbid obesity: complicated acute illness or injury  Pleural effusion, left: complicated acute illness or injury  Septic shock: complicated acute illness or injury    Amount and/or Complexity of Data Reviewed  Labs: ordered.  Radiology: ordered.  ECG/medicine tests: ordered.    Risk  OTC drugs.  Prescription drug management.  Parenteral controlled substances.  Drug therapy requiring intensive monitoring for toxicity.  Decision regarding hospitalization.  Diagnosis or treatment significantly limited by social determinants of health.    Critical Care  Total time providing critical care: 120 minutes        Final diagnoses:   Acute respiratory failure, unspecified whether  with hypoxia or hypercapnia   Morbid obesity   Acute renal failure, unspecified acute renal failure type   Acute respiratory failure with hypercapnia   Hyperkalemia   Hyperphosphatemia   Septic shock   Pleural effusion, left   Fever in adult   Adverse effect of oxycodone       ED Disposition  ED Disposition       ED Disposition   Transfer to Another Facility     Condition   --    Comment   --               No follow-up provider specified.       Medication List        Stop      Dexcom G6 Sensor     Insulin Pen Needle 30G X 8 MM misc     Lancets Micro Thin 33G misc     Nebulizer/Tubing/Mouthpiece kit                 Sherry Reddy,   01/02/25 0511       Sherry Reddy,   01/02/25 0624       Sherry Reddy, DO  01/02/25 0642       Sherry Reddy,   01/06/25 [id number]

## 2025-01-02 NOTE — PLAN OF CARE
Goal Outcome Evaluation:         Patient FIO2 settings were able to be weaned from 100% down to 70%, all other settings remain the same.

## 2025-01-02 NOTE — ED NOTES
Pt resting comfortably, lines patent, tubes secured, Bed in lowest position. Urena cath draining, ETT tube 22 @ lip and OG @ 55.

## 2025-01-02 NOTE — ED NOTES
Report given Ale Medic with [facility]. Patients cell phone and  sent with patient. All lines, drains and tubes intact. SANGEETA.

## 2025-01-03 ENCOUNTER — APPOINTMENT (OUTPATIENT)
Dept: GENERAL RADIOLOGY | Facility: HOSPITAL | Age: 40
DRG: 870 | End: 2025-01-03
Payer: COMMERCIAL

## 2025-01-03 LAB
ALBUMIN SERPL-MCNC: 3.3 G/DL (ref 3.5–5.2)
ALBUMIN SERPL-MCNC: 3.3 G/DL (ref 3.5–5.2)
ALBUMIN/GLOB SERPL: 0.8 G/DL
ALBUMIN/GLOB SERPL: 0.8 G/DL
ALP SERPL-CCNC: 139 U/L (ref 39–117)
ALP SERPL-CCNC: 145 U/L (ref 39–117)
ALT SERPL W P-5'-P-CCNC: 54 U/L (ref 1–33)
ALT SERPL W P-5'-P-CCNC: 54 U/L (ref 1–33)
ANION GAP SERPL CALCULATED.3IONS-SCNC: 11 MMOL/L (ref 5–15)
ANION GAP SERPL CALCULATED.3IONS-SCNC: 13 MMOL/L (ref 5–15)
ANION GAP SERPL CALCULATED.3IONS-SCNC: 13 MMOL/L (ref 5–15)
ANION GAP SERPL CALCULATED.3IONS-SCNC: 16 MMOL/L (ref 5–15)
ARTERIAL PATENCY WRIST A: ABNORMAL
ARTERIAL PATENCY WRIST A: ABNORMAL
AST SERPL-CCNC: 63 U/L (ref 1–32)
AST SERPL-CCNC: 63 U/L (ref 1–32)
ATMOSPHERIC PRESS: ABNORMAL MM[HG]
ATMOSPHERIC PRESS: ABNORMAL MM[HG]
B-OH-BUTYR SERPL-SCNC: 0.66 MMOL/L (ref 0.02–0.27)
BASE EXCESS BLDA CALC-SCNC: -0.1 MMOL/L (ref 0–2)
BASE EXCESS BLDA CALC-SCNC: -1.1 MMOL/L (ref 0–2)
BASOPHILS # BLD AUTO: 0.02 10*3/MM3 (ref 0–0.2)
BASOPHILS NFR BLD AUTO: 0.2 % (ref 0–1.5)
BDY SITE: ABNORMAL
BDY SITE: ABNORMAL
BILIRUB SERPL-MCNC: 0.2 MG/DL (ref 0–1.2)
BILIRUB SERPL-MCNC: 0.3 MG/DL (ref 0–1.2)
BODY TEMPERATURE: 37
BODY TEMPERATURE: 37
BUN SERPL-MCNC: 28 MG/DL (ref 6–20)
BUN SERPL-MCNC: 28 MG/DL (ref 6–20)
BUN SERPL-MCNC: 29 MG/DL (ref 6–20)
BUN SERPL-MCNC: 31 MG/DL (ref 6–20)
BUN/CREAT SERPL: 21.7 (ref 7–25)
BUN/CREAT SERPL: 21.7 (ref 7–25)
BUN/CREAT SERPL: 23 (ref 7–25)
BUN/CREAT SERPL: 24.8 (ref 7–25)
CALCIUM SPEC-SCNC: 8.8 MG/DL (ref 8.6–10.5)
CALCIUM SPEC-SCNC: 8.8 MG/DL (ref 8.6–10.5)
CALCIUM SPEC-SCNC: 8.9 MG/DL (ref 8.6–10.5)
CALCIUM SPEC-SCNC: 9 MG/DL (ref 8.6–10.5)
CHLORIDE SERPL-SCNC: 105 MMOL/L (ref 98–107)
CHOLEST SERPL-MCNC: 111 MG/DL (ref 0–200)
CO2 BLDA-SCNC: 26.7 MMOL/L (ref 22–33)
CO2 BLDA-SCNC: 28.5 MMOL/L (ref 22–33)
CO2 SERPL-SCNC: 21 MMOL/L (ref 22–29)
CO2 SERPL-SCNC: 24 MMOL/L (ref 22–29)
CO2 SERPL-SCNC: 24 MMOL/L (ref 22–29)
CO2 SERPL-SCNC: 27 MMOL/L (ref 22–29)
COHGB MFR BLD: 1.8 % (ref 0–2)
COHGB MFR BLD: 1.8 % (ref 0–2)
CREAT SERPL-MCNC: 1.25 MG/DL (ref 0.57–1)
CREAT SERPL-MCNC: 1.26 MG/DL (ref 0.57–1)
CREAT SERPL-MCNC: 1.29 MG/DL (ref 0.57–1)
CREAT SERPL-MCNC: 1.29 MG/DL (ref 0.57–1)
CRP SERPL-MCNC: 22.72 MG/DL (ref 0–0.5)
D-LACTATE SERPL-SCNC: 1 MMOL/L (ref 0.5–2)
DEPRECATED RDW RBC AUTO: 61.4 FL (ref 37–54)
EGFRCR SERPLBLD CKD-EPI 2021: 54.6 ML/MIN/1.73
EGFRCR SERPLBLD CKD-EPI 2021: 54.6 ML/MIN/1.73
EGFRCR SERPLBLD CKD-EPI 2021: 56.2 ML/MIN/1.73
EGFRCR SERPLBLD CKD-EPI 2021: 56.7 ML/MIN/1.73
EOSINOPHIL # BLD AUTO: 0.05 10*3/MM3 (ref 0–0.4)
EOSINOPHIL NFR BLD AUTO: 0.6 % (ref 0.3–6.2)
EPAP: 0
EPAP: 0
ERYTHROCYTE [DISTWIDTH] IN BLOOD BY AUTOMATED COUNT: 18 % (ref 12.3–15.4)
GLOBULIN UR ELPH-MCNC: 3.9 GM/DL
GLOBULIN UR ELPH-MCNC: 3.9 GM/DL
GLUCOSE BLDC GLUCOMTR-MCNC: 112 MG/DL (ref 70–130)
GLUCOSE BLDC GLUCOMTR-MCNC: 113 MG/DL (ref 70–130)
GLUCOSE BLDC GLUCOMTR-MCNC: 114 MG/DL (ref 70–130)
GLUCOSE BLDC GLUCOMTR-MCNC: 116 MG/DL (ref 70–130)
GLUCOSE BLDC GLUCOMTR-MCNC: 116 MG/DL (ref 70–130)
GLUCOSE BLDC GLUCOMTR-MCNC: 118 MG/DL (ref 70–130)
GLUCOSE BLDC GLUCOMTR-MCNC: 120 MG/DL (ref 70–130)
GLUCOSE BLDC GLUCOMTR-MCNC: 122 MG/DL (ref 70–130)
GLUCOSE BLDC GLUCOMTR-MCNC: 127 MG/DL (ref 70–130)
GLUCOSE BLDC GLUCOMTR-MCNC: 127 MG/DL (ref 70–130)
GLUCOSE BLDC GLUCOMTR-MCNC: 129 MG/DL (ref 70–130)
GLUCOSE BLDC GLUCOMTR-MCNC: 129 MG/DL (ref 70–130)
GLUCOSE BLDC GLUCOMTR-MCNC: 141 MG/DL (ref 70–130)
GLUCOSE BLDC GLUCOMTR-MCNC: 151 MG/DL (ref 70–130)
GLUCOSE BLDC GLUCOMTR-MCNC: 152 MG/DL (ref 70–130)
GLUCOSE BLDC GLUCOMTR-MCNC: 166 MG/DL (ref 70–130)
GLUCOSE BLDC GLUCOMTR-MCNC: 183 MG/DL (ref 70–130)
GLUCOSE SERPL-MCNC: 142 MG/DL (ref 65–99)
GLUCOSE SERPL-MCNC: 147 MG/DL (ref 65–99)
GLUCOSE SERPL-MCNC: 147 MG/DL (ref 65–99)
GLUCOSE SERPL-MCNC: 168 MG/DL (ref 65–99)
HCO3 BLDA-SCNC: 25.2 MMOL/L (ref 20–26)
HCO3 BLDA-SCNC: 26.8 MMOL/L (ref 20–26)
HCT VFR BLD AUTO: 25.2 % (ref 34–46.6)
HCT VFR BLD CALC: 21.9 % (ref 38–51)
HCT VFR BLD CALC: 23.6 % (ref 38–51)
HGB BLD-MCNC: 7.5 G/DL (ref 12–15.9)
HGB BLDA-MCNC: 7.2 G/DL (ref 14–18)
HGB BLDA-MCNC: 7.7 G/DL (ref 14–18)
IMM GRANULOCYTES # BLD AUTO: 0.05 10*3/MM3 (ref 0–0.05)
IMM GRANULOCYTES NFR BLD AUTO: 0.6 % (ref 0–0.5)
INHALED O2 CONCENTRATION: 100 %
INHALED O2 CONCENTRATION: 60 %
IPAP: 0
IPAP: 0
LYMPHOCYTES # BLD AUTO: 1.04 10*3/MM3 (ref 0.7–3.1)
LYMPHOCYTES NFR BLD AUTO: 12.6 % (ref 19.6–45.3)
MAGNESIUM SERPL-MCNC: 2 MG/DL (ref 1.6–2.6)
MAGNESIUM SERPL-MCNC: 2 MG/DL (ref 1.6–2.6)
MCH RBC QN AUTO: 28.8 PG (ref 26.6–33)
MCHC RBC AUTO-ENTMCNC: 29.8 G/DL (ref 31.5–35.7)
MCV RBC AUTO: 96.9 FL (ref 79–97)
METHGB BLD QL: 0.4 % (ref 0–1.5)
METHGB BLD QL: 0.5 % (ref 0–1.5)
MODALITY: ABNORMAL
MODALITY: ABNORMAL
MONOCYTES # BLD AUTO: 0.79 10*3/MM3 (ref 0.1–0.9)
MONOCYTES NFR BLD AUTO: 9.6 % (ref 5–12)
NEUTROPHILS NFR BLD AUTO: 6.28 10*3/MM3 (ref 1.7–7)
NEUTROPHILS NFR BLD AUTO: 76.4 % (ref 42.7–76)
NRBC BLD AUTO-RTO: 0.2 /100 WBC (ref 0–0.2)
OXYHGB MFR BLDV: 91.3 % (ref 94–99)
OXYHGB MFR BLDV: 97.3 % (ref 94–99)
PAW @ PEAK INSP FLOW SETTING VENT: 0 CMH2O
PAW @ PEAK INSP FLOW SETTING VENT: 0 CMH2O
PCO2 BLDA: 49.8 MM HG (ref 35–45)
PCO2 BLDA: 55.6 MM HG (ref 35–45)
PCO2 TEMP ADJ BLD: 49.8 MM HG (ref 35–45)
PCO2 TEMP ADJ BLD: 55.6 MM HG (ref 35–45)
PEEP RESPIRATORY: 14 CM[H2O]
PEEP RESPIRATORY: 14 CM[H2O]
PH BLDA: 7.29 PH UNITS (ref 7.35–7.45)
PH BLDA: 7.31 PH UNITS (ref 7.35–7.45)
PH, TEMP CORRECTED: 7.29 PH UNITS
PH, TEMP CORRECTED: 7.31 PH UNITS
PHOSPHATE SERPL-MCNC: 2.7 MG/DL (ref 2.5–4.5)
PHOSPHATE SERPL-MCNC: 2.8 MG/DL (ref 2.5–4.5)
PLATELET # BLD AUTO: 196 10*3/MM3 (ref 140–450)
PMV BLD AUTO: 9.3 FL (ref 6–12)
PO2 BLDA: 118 MM HG (ref 83–108)
PO2 BLDA: 69.1 MM HG (ref 83–108)
PO2 TEMP ADJ BLD: 118 MM HG (ref 83–108)
PO2 TEMP ADJ BLD: 69.1 MM HG (ref 83–108)
POTASSIUM SERPL-SCNC: 4.2 MMOL/L (ref 3.5–5.2)
POTASSIUM SERPL-SCNC: 4.4 MMOL/L (ref 3.5–5.2)
PROT SERPL-MCNC: 7.2 G/DL (ref 6–8.5)
PROT SERPL-MCNC: 7.2 G/DL (ref 6–8.5)
QT INTERVAL: 344 MS
QT INTERVAL: 362 MS
QTC INTERVAL: 476 MS
QTC INTERVAL: 513 MS
RBC # BLD AUTO: 2.6 10*6/MM3 (ref 3.77–5.28)
SODIUM SERPL-SCNC: 142 MMOL/L (ref 136–145)
SODIUM SERPL-SCNC: 143 MMOL/L (ref 136–145)
TOTAL RATE: 22 BREATHS/MINUTE
TOTAL RATE: 22 BREATHS/MINUTE
TRIGL SERPL-MCNC: 428 MG/DL (ref 0–150)
UFH PPP CHRO-ACNC: 0.1 IU/ML (ref 0.3–0.7)
UFH PPP CHRO-ACNC: 0.19 IU/ML (ref 0.3–0.7)
UFH PPP CHRO-ACNC: 0.23 IU/ML (ref 0.3–0.7)
VANCOMYCIN SERPL-MCNC: 13.4 MCG/ML (ref 5–40)
VENTILATOR MODE: ABNORMAL
VENTILATOR MODE: ABNORMAL
VT ON VENT VENT: 0.39 ML
VT ON VENT VENT: 0.42 ML
WBC NRBC COR # BLD AUTO: 8.23 10*3/MM3 (ref 3.4–10.8)

## 2025-01-03 PROCEDURE — 82465 ASSAY BLD/SERUM CHOLESTEROL: CPT | Performed by: INTERNAL MEDICINE

## 2025-01-03 PROCEDURE — 80202 ASSAY OF VANCOMYCIN: CPT

## 2025-01-03 PROCEDURE — 25810000003 SODIUM CHLORIDE 0.9 % SOLUTION: Performed by: NURSE PRACTITIONER

## 2025-01-03 PROCEDURE — 25010000002 ALBUMIN HUMAN 25% PER 50 ML: Performed by: INTERNAL MEDICINE

## 2025-01-03 PROCEDURE — 80053 COMPREHEN METABOLIC PANEL: CPT

## 2025-01-03 PROCEDURE — 25010000002 HEPARIN (PORCINE) PER 1000 UNITS

## 2025-01-03 PROCEDURE — 71045 X-RAY EXAM CHEST 1 VIEW: CPT

## 2025-01-03 PROCEDURE — 25010000002 CEFEPIME PER 500 MG

## 2025-01-03 PROCEDURE — 25010000002 VANCOMYCIN HCL 1.25 G RECONSTITUTED SOLUTION 1 EACH VIAL

## 2025-01-03 PROCEDURE — 94003 VENT MGMT INPAT SUBQ DAY: CPT

## 2025-01-03 PROCEDURE — 85520 HEPARIN ASSAY: CPT

## 2025-01-03 PROCEDURE — 93010 ELECTROCARDIOGRAM REPORT: CPT | Performed by: INTERNAL MEDICINE

## 2025-01-03 PROCEDURE — 86140 C-REACTIVE PROTEIN: CPT | Performed by: INTERNAL MEDICINE

## 2025-01-03 PROCEDURE — 82948 REAGENT STRIP/BLOOD GLUCOSE: CPT

## 2025-01-03 PROCEDURE — 84478 ASSAY OF TRIGLYCERIDES: CPT | Performed by: INTERNAL MEDICINE

## 2025-01-03 PROCEDURE — 82805 BLOOD GASES W/O2 SATURATION: CPT

## 2025-01-03 PROCEDURE — 85025 COMPLETE CBC W/AUTO DIFF WBC: CPT

## 2025-01-03 PROCEDURE — 25010000002 HEPARIN (PORCINE) 25000-0.45 UT/250ML-% SOLUTION

## 2025-01-03 PROCEDURE — 84100 ASSAY OF PHOSPHORUS: CPT | Performed by: INTERNAL MEDICINE

## 2025-01-03 PROCEDURE — 94799 UNLISTED PULMONARY SVC/PX: CPT

## 2025-01-03 PROCEDURE — 25810000003 SODIUM CHLORIDE 0.9 % SOLUTION 250 ML FLEX CONT

## 2025-01-03 PROCEDURE — 83050 HGB METHEMOGLOBIN QUAN: CPT

## 2025-01-03 PROCEDURE — 93005 ELECTROCARDIOGRAM TRACING: CPT | Performed by: NURSE PRACTITIONER

## 2025-01-03 PROCEDURE — 3E0G76Z INTRODUCTION OF NUTRITIONAL SUBSTANCE INTO UPPER GI, VIA NATURAL OR ARTIFICIAL OPENING: ICD-10-PCS | Performed by: INTERNAL MEDICINE

## 2025-01-03 PROCEDURE — 83735 ASSAY OF MAGNESIUM: CPT | Performed by: INTERNAL MEDICINE

## 2025-01-03 PROCEDURE — 25010000002 FENTANYL 10 MCG/1 ML NS: Performed by: NURSE PRACTITIONER

## 2025-01-03 PROCEDURE — 25810000003 SODIUM CHLORIDE 0.9 % SOLUTION: Performed by: INTERNAL MEDICINE

## 2025-01-03 PROCEDURE — 83605 ASSAY OF LACTIC ACID: CPT | Performed by: INTERNAL MEDICINE

## 2025-01-03 PROCEDURE — P9047 ALBUMIN (HUMAN), 25%, 50ML: HCPCS | Performed by: INTERNAL MEDICINE

## 2025-01-03 PROCEDURE — 25010000002 AMIODARONE IN DEXTROSE 5% 360-4.14 MG/200ML-% SOLUTION: Performed by: NURSE PRACTITIONER

## 2025-01-03 PROCEDURE — 25010000002 MIDAZOLAM 1 MG/ML 100ML NS 100 MG/100ML SOLUTION: Performed by: NURSE PRACTITIONER

## 2025-01-03 PROCEDURE — 25010000002 METRONIDAZOLE 500 MG/100ML SOLUTION: Performed by: INTERNAL MEDICINE

## 2025-01-03 PROCEDURE — 99291 CRITICAL CARE FIRST HOUR: CPT | Performed by: INTERNAL MEDICINE

## 2025-01-03 PROCEDURE — 82375 ASSAY CARBOXYHB QUANT: CPT

## 2025-01-03 PROCEDURE — 74018 RADEX ABDOMEN 1 VIEW: CPT

## 2025-01-03 RX ORDER — DULOXETIN HYDROCHLORIDE 30 MG/1
30 CAPSULE, DELAYED RELEASE ORAL NIGHTLY
COMMUNITY

## 2025-01-03 RX ORDER — LANOLIN ALCOHOL/MO/W.PET/CERES
1000 CREAM (GRAM) TOPICAL DAILY
COMMUNITY

## 2025-01-03 RX ORDER — ACETAMINOPHEN 325 MG/1
650 TABLET ORAL EVERY 8 HOURS PRN
Status: DISCONTINUED | OUTPATIENT
Start: 2025-01-03 | End: 2025-01-05

## 2025-01-03 RX ORDER — METOPROLOL SUCCINATE 50 MG/1
50 TABLET, EXTENDED RELEASE ORAL 2 TIMES DAILY
COMMUNITY

## 2025-01-03 RX ORDER — NYSTATIN 100000 [USP'U]/G
POWDER TOPICAL EVERY 12 HOURS SCHEDULED
Status: DISCONTINUED | OUTPATIENT
Start: 2025-01-03 | End: 2025-02-03 | Stop reason: HOSPADM

## 2025-01-03 RX ORDER — HEPARIN SODIUM 1000 [USP'U]/ML
4000 INJECTION, SOLUTION INTRAVENOUS; SUBCUTANEOUS ONCE
Status: COMPLETED | OUTPATIENT
Start: 2025-01-03 | End: 2025-01-03

## 2025-01-03 RX ORDER — DULOXETIN HYDROCHLORIDE 30 MG/1
60 CAPSULE, DELAYED RELEASE ORAL DAILY
COMMUNITY

## 2025-01-03 RX ORDER — ENOXAPARIN SODIUM 100 MG/ML
100 INJECTION SUBCUTANEOUS EVERY 12 HOURS SCHEDULED
Status: ON HOLD | COMMUNITY
End: 2025-02-03

## 2025-01-03 RX ADMIN — VANCOMYCIN HYDROCHLORIDE 1250 MG: 1.25 INJECTION, POWDER, LYOPHILIZED, FOR SOLUTION INTRAVENOUS at 22:14

## 2025-01-03 RX ADMIN — FAMOTIDINE 20 MG: 10 INJECTION, SOLUTION INTRAVENOUS at 08:25

## 2025-01-03 RX ADMIN — MIDAZOLAM HYDROCHLORIDE 10 MG/HR: 1 INJECTION, SOLUTION INTRAVENOUS at 01:55

## 2025-01-03 RX ADMIN — CEFEPIME 2000 MG: 2 INJECTION, POWDER, FOR SOLUTION INTRAVENOUS at 15:55

## 2025-01-03 RX ADMIN — INSULIN HUMAN 15.4 UNITS/HR: 1 INJECTION, SOLUTION INTRAVENOUS at 15:51

## 2025-01-03 RX ADMIN — HEPARIN SODIUM 17 UNITS/KG/HR: 10000 INJECTION, SOLUTION INTRAVENOUS at 06:19

## 2025-01-03 RX ADMIN — CHLORHEXIDINE GLUCONATE 0.12% ORAL RINSE 15 ML: 1.2 LIQUID ORAL at 08:25

## 2025-01-03 RX ADMIN — CEFEPIME 2000 MG: 2 INJECTION, POWDER, FOR SOLUTION INTRAVENOUS at 23:56

## 2025-01-03 RX ADMIN — INSULIN HUMAN 19.5 UNITS/HR: 1 INJECTION, SOLUTION INTRAVENOUS at 22:15

## 2025-01-03 RX ADMIN — SODIUM CHLORIDE 100 ML/HR: 9 INJECTION, SOLUTION INTRAVENOUS at 16:00

## 2025-01-03 RX ADMIN — ACETAMINOPHEN 650 MG: 325 TABLET ORAL at 22:13

## 2025-01-03 RX ADMIN — SENNOSIDES AND DOCUSATE SODIUM 2 TABLET: 50; 8.6 TABLET ORAL at 08:24

## 2025-01-03 RX ADMIN — CHLORHEXIDINE GLUCONATE 0.12% ORAL RINSE 15 ML: 1.2 LIQUID ORAL at 22:13

## 2025-01-03 RX ADMIN — Medication 100 MCG/HR: at 14:28

## 2025-01-03 RX ADMIN — HEPARIN SODIUM 4000 UNITS: 1000 INJECTION INTRAVENOUS; SUBCUTANEOUS at 14:27

## 2025-01-03 RX ADMIN — Medication 10 ML: at 22:13

## 2025-01-03 RX ADMIN — CEFEPIME 2000 MG: 2 INJECTION, POWDER, FOR SOLUTION INTRAVENOUS at 08:25

## 2025-01-03 RX ADMIN — MUPIROCIN 1 APPLICATION: 20 OINTMENT TOPICAL at 22:21

## 2025-01-03 RX ADMIN — MIDAZOLAM HYDROCHLORIDE 10 MG/HR: 1 INJECTION, SOLUTION INTRAVENOUS at 11:59

## 2025-01-03 RX ADMIN — SODIUM CHLORIDE 500 ML: 9 INJECTION, SOLUTION INTRAVENOUS at 20:36

## 2025-01-03 RX ADMIN — SODIUM CHLORIDE 100 ML/HR: 9 INJECTION, SOLUTION INTRAVENOUS at 08:30

## 2025-01-03 RX ADMIN — METRONIDAZOLE 500 MG: 500 INJECTION, SOLUTION INTRAVENOUS at 04:24

## 2025-01-03 RX ADMIN — HEPARIN SODIUM 4000 UNITS: 1000 INJECTION INTRAVENOUS; SUBCUTANEOUS at 06:19

## 2025-01-03 RX ADMIN — MIDAZOLAM HYDROCHLORIDE 10 MG/HR: 1 INJECTION, SOLUTION INTRAVENOUS at 22:15

## 2025-01-03 RX ADMIN — HEPARIN SODIUM 18 UNITS/KG/HR: 10000 INJECTION, SOLUTION INTRAVENOUS at 15:52

## 2025-01-03 RX ADMIN — VANCOMYCIN HYDROCHLORIDE 1250 MG: 1.25 INJECTION, POWDER, LYOPHILIZED, FOR SOLUTION INTRAVENOUS at 08:24

## 2025-01-03 RX ADMIN — Medication 10 ML: at 08:25

## 2025-01-03 RX ADMIN — ALBUMIN (HUMAN) 25 G: 0.25 INJECTION, SOLUTION INTRAVENOUS at 08:23

## 2025-01-03 RX ADMIN — AMIODARONE HYDROCHLORIDE 0.5 MG/MIN: 1.8 INJECTION, SOLUTION INTRAVENOUS at 10:40

## 2025-01-03 RX ADMIN — FAMOTIDINE 20 MG: 10 INJECTION, SOLUTION INTRAVENOUS at 22:13

## 2025-01-03 RX ADMIN — MUPIROCIN 1 APPLICATION: 20 OINTMENT TOPICAL at 08:25

## 2025-01-03 RX ADMIN — METRONIDAZOLE 500 MG: 500 INJECTION, SOLUTION INTRAVENOUS at 22:13

## 2025-01-03 RX ADMIN — NYSTATIN: 100000 POWDER TOPICAL at 23:58

## 2025-01-03 RX ADMIN — SENNOSIDES AND DOCUSATE SODIUM 2 TABLET: 50; 8.6 TABLET ORAL at 22:14

## 2025-01-03 RX ADMIN — INSULIN HUMAN 21.2 UNITS/HR: 1 INJECTION, SOLUTION INTRAVENOUS at 04:23

## 2025-01-03 RX ADMIN — SODIUM CHLORIDE 500 ML: 9 INJECTION, SOLUTION INTRAVENOUS at 11:59

## 2025-01-03 RX ADMIN — INSULIN HUMAN 18 UNITS/HR: 1 INJECTION, SOLUTION INTRAVENOUS at 09:44

## 2025-01-03 RX ADMIN — METRONIDAZOLE 500 MG: 500 INJECTION, SOLUTION INTRAVENOUS at 12:20

## 2025-01-03 NOTE — PLAN OF CARE
Goal Outcome Evaluation:  Plan of Care Reviewed With: patient        Progress: no change  Outcome Evaluation: Pt remains intubated and sedated. FiO2 down to 60%, peep remains at 14. Levophed continued at low dose (0.02 mcg/kg/min). Fentanyl and Versed gtts unchanged, withdraws from pain. Insulin gtt continued without complications. Continuous fluids started- NS @ 100 mls/hr. K now 4.2, lokelma held. UOP- 1400 mls. Son updated.

## 2025-01-03 NOTE — H&P
Pulmonary/Critical Care ICU note     LOS: 1 day   Patient Care Team:  Bladimir Calle PA-C as PCP - General (Physician Assistant)    Chief Complaint: AMS    Subjective     History reviewed and updated in EMR as indicated.    Interval history:    Patient remains on mechanical ventilator.  She is requiring high levels of insulin infusion for blood sugars.  She is sedated.  She has copious secretions.    History taken from: record    Past Medical History/Family History/Social History were reviewed by me and updates were made.     Review of Systems:    Review of 14 systems was completed with positives and pertinent negatives noted in the subjective section.  All other systems reviewed and are negative.         Objective     Vital Signs  Temp:  [98.4 °F (36.9 °C)-103.1 °F (39.5 °C)] 100.2 °F (37.9 °C)  Heart Rate:  [] 100  Resp:  [20-27] 23  BP: ()/(39-85) 128/63  Arterial Line BP: ()/(41-85) 111/50  FiO2 (%):  [60 %-100 %] 60 %  There is no height or weight on file to calculate BMI.  Mode: VC+/AC  FiO2 (%):  [60 %-100 %] 60 %  S RR:  [16-22] 22  S VT:  [385 mL-450 mL] 385 mL  PEEP/CPAP (cm H2O):  [5 cm H20-14 cm H20] 14 cm H20  MAP (cm H2O):  [18-21] 20    Intake/Output Summary (Last 24 hours) at 1/3/2025 0949  Last data filed at 1/3/2025 0848  Gross per 24 hour   Intake 3881.92 ml   Output 3560 ml   Net 321.92 ml      Physical Exam:     General Appearance:    Alert, cooperative, in no acute distress   Head:    Normocephalic, without obvious abnormality, atraumatic   Eyes:            Lids and lashes normal   ENMT:   Ears appear intact with no abnormalities noted    Oral endotracheal tube in place.   Neck:   No adenopathy, supple, trachea midline, no thyromegaly,      no carotid bruit, no JVD   Lungs/resp:   On ventilator, symmetric chest rise, no crepitus, bilateral rhonchi, no chest wall tenderness                  Heart/CV:    Regular rhythm and normal rate, normal S1 and S2, no         murmur    Abdomen/GI:   Obese, nontender, nondistended   G/U:     Deferred   Extremities/MSK:   No clubbing, cyanosis or edema.  Normal tone.  No deformities.    Pulses:   Pulses palpable and equal bilaterally   Skin:   No bleeding, bruising or rash   Hem/Lymph:   No cervical or supraclavicular adenopathy.    Neurologic:   Moves all extremities with no obvious focal motor deficit.  Cranial nerves 2 - 12 grossly intact            Psychiatric: Sedated.     The above findings are documentation of my personal physical exam findings from today.   Electronically signed by:  Roly Garcia MD  01/03/25  09:49 EST     Results Review:     I reviewed the patient's new clinical results.   Results from last 7 days   Lab Units 01/03/25  0433 01/03/25  0018 01/02/25 2021 01/02/25  1314 01/02/25  0900 01/02/25  0453   SODIUM mmol/L 142  142 143 134*   < > 137 136   POTASSIUM mmol/L 4.2  4.2 4.4 4.7   < > 6.1* 7.0*   CHLORIDE mmol/L 105  105 105 98   < > 100 101   CO2 mmol/L 24.0  24.0 27.0 22.0   < > 22.6 22.3   BUN mg/dL 28*  28* 31* 34*   < > 40* 41*   CREATININE mg/dL 1.29*  1.29* 1.25* 1.38*   < > 2.39* 2.50*   CALCIUM mg/dL 8.8  8.8 9.0 8.7   < > 8.1* 8.4*   BILIRUBIN mg/dL 0.3  --   --   --  0.3 0.4   ALK PHOS U/L 145*  --   --   --  137* 142*   ALT (SGPT) U/L 54*  --   --   --  71* 71*   AST (SGOT) U/L 63*  --   --   --  119* 136*   GLUCOSE mg/dL 147*  147* 168* 234*   < > 315* 314*    < > = values in this interval not displayed.     Results from last 7 days   Lab Units 01/03/25  0433 01/02/25  1314 01/02/25  0900   WBC 10*3/mm3 8.23 9.71 10.48   HEMOGLOBIN g/dL 7.5* 8.5* 8.4*   HEMATOCRIT % 25.2* 28.0* 28.1*   PLATELETS 10*3/mm3 196 222 232     Results from last 7 days   Lab Units 01/03/25  0425   PH, ARTERIAL pH units 7.291*   PO2 ART mm Hg 69.1*   PCO2, ARTERIAL mm Hg 55.6*   HCO3 ART mmol/L 26.8*       I reviewed the patient's new imaging including images and reports.    Chest x-ray 1/3/2025 shows cardiomegaly with  left greater than right bilateral pulmonary infiltrates/possible left effusion with endotracheal tube in place and right internal jugular catheter.    CT chest 1/2/2025 shows bibasilar airspace opacities and cardiomegaly.  There is a small left pleural effusion.    CT abdomen and pelvis shows enlarged liver.    Radiologist impression follows:    IMPRESSION:     CT CHEST:  BIBASILAR PROBABLE PNEUMONIA AND ATELECTASIS WITH SMALL LEFT PLEURAL  EFFUSION.     CT ABDOMEN AND PELVIS:  SEVERELY ENLARGED LIVER WITH FATTY INFILTRATION.  STATUS POST LEFT NEPHRECTOMY.    CT head 1/2/2025:    IMPRESSION:     NO ACUTE INTRACRANIAL ABNORMALITY.       Medication Review:   cefepime, 2,000 mg, Intravenous, Q12H  chlorhexidine, 15 mL, Mouth/Throat, Q12H  famotidine, 20 mg, Intravenous, BID  metroNIDAZOLE, 500 mg, Intravenous, Q8H  mupirocin, 1 Application, Each Nare, BID  senna-docusate sodium, 2 tablet, Nasogastric, BID  sodium chloride, 10 mL, Intravenous, Q12H  sodium zirconium cyclosilicate, 10 g, Nasogastric, TID  vancomycin, 1,250 mg, Intravenous, Q12H      amiodarone, 0.5 mg/min, Last Rate: 0.5 mg/min (01/03/25 0021)  fentanyl 10 mcg/mL,  mcg/hr, Last Rate: 100 mcg/hr (01/02/25 1808)  heparin, 17 Units/kg/hr, Last Rate: 17 Units/kg/hr (01/03/25 0620)  insulin, 0-100 Units/hr, Last Rate: 18 Units/hr (01/03/25 0944)  Midazolam 1 mg/mL 100mL NS, 1-10 mg/hr, Last Rate: 10 mg/hr (01/03/25 0155)  norepinephrine, 0.02-0.3 mcg/kg/min, Last Rate: 0.02 mcg/kg/min (01/03/25 0715)  Pharmacy to Dose Heparin,   Pharmacy to dose vancomycin,   sodium chloride, 100 mL/hr, Last Rate: 100 mL/hr (01/03/25 0830)        Assessment & Plan     Active Hospital Problems    Diagnosis     **Acute respiratory failure with hypercapnia     Hyperkalemia     Sepsis     Respiratory acidosis with metabolic acidosis     Acute respiratory failure     Type 2 diabetes mellitus with hyperglycemia     MARIAA (acute kidney injury)      39 y.o. female with history of  HLD, hypothyroidism, atrial fibrillation, history of PE/DVT, factor V Leiden mutation, stroke, presented to [facility] with AMS and difficult to arouse.  Evaluation there showed significant renal failure, hyperglycemia, hypercapnic respiratory failure, and basilar pulmonary infiltrates concerning for pneumonia.  She was intubated for respiratory failure and placed on pressors for hypotension.  She was treated with shifting agents for hyperkalemia.  She was given Narcan without significant response.  She was subsequently transferred to [facility] ICU for ongoing management.    Patient continues to have significant secretions.  She is not yet appropriate for formal ventilator weaning.  Continue antibiotics.    Plan:  1.  For acute mixed hypoxemic and hypercapnic respiratory failure: Continue mechanical ventilation.  Adjust settings to optimize ventilation and oxygenation and minimize barotrauma.  2.  For pulmonary infiltrates/basilar pneumonia unknown organism: Worrisome for aspiration.  Follow-up cultures.  Broad-spectrum antibiotics with cefepime plus Flagyl and vancomycin.  3.  For MARIAA: Improving.  Nephrology following.  Avoid nephrotoxins when possible and renally dose medications when appropriate.  4.  For hyperkalemia: Status post shifting agents.  Improved.  Monitor.  5.  For poorly controlled T2DM: Glucose monitoring and insulin drip protocol.  6.  For sepsis: Broad-spectrum antibiotics as above.  Pressors if needed.  Repeat lactic acid level.  7.  For history of atrial fibrillation/DVT/PE/factor V Leiden: Heparin drip for now but if hemoglobin stable transition to home Eliquis.  Hemoglobin dropped a bit.  8.  For chronic anemia: Monitor.  Transfuse if necessary.    Patient is critically ill secondary multisystem organ failure and has high risk of life-threatening decline in condition.  As such, she requires continuous monitoring frequent reassessment for consideration of adjustment management in order to minimize  that risk.  I personally reassessed her multiple times today.    Critical care time : 38 minutes spent by me personally.(This excludes time spent performing separately reportable procedures and services). Critical care time includes high complexity decision making to assess, manipulate, and support vital organ system failure in this individual who has impairment of one or more vital organ systems such that there is a high probability of imminent or life threatening deterioration in the patient’s condition.    Electronically signed by:  Roly Garcia MD  01/03/25  09:49 EST        Please note that portions of this note were completed with [employer] - a voice recognition program.

## 2025-01-03 NOTE — PROGRESS NOTES
LOS: 1 day   Patient Care Team:  Bladimir Calle PA-C as PCP - General (Physician Assistant)    Chief Complaint: MARIAA   Hyperkalemia    Subjective     Improving renal function. Hyperkalemia resolved. UOP dropped this morning. Will try fluid challenge     Subjective:  Symptoms:  No shortness of breath.        History taken from: family RN    Objective     Vital Sign Min/Max for last 24 hours  Temp  Min: 99.7 °F (37.6 °C)  Max: 103.1 °F (39.5 °C)   BP  Min: 96/53  Max: 169/85   Pulse  Min: 95  Max: 133   Resp  Min: 22  Max: 27   SpO2  Min: 90 %  Max: 100 %   No data recorded   No data recorded         I/O this shift:  In: 2044 [I.V.:2044]  Out: 140 [Urine:140]  I/O last 3 completed shifts:  In: 2864.9 [I.V.:2294.1; Other:100; IV Piggyback:470.8]  Out: 3500 [Urine:3500]    Objective:  General Appearance:  Ill-appearing.    Vital signs: (most recent): Blood pressure 125/52, pulse 102, temperature (!) 100.8 °F (38.2 °C), temperature source Oral, resp. rate 22, SpO2 93%, not currently breastfeeding.  Vital signs are normal.    Output: Minimal urine output (Urena cath).    Lungs:  No decreased breath sounds or wheezes.  (Intubated on MV)  Heart: Normal rate.  Regular rhythm.  S1 normal and S2 normal.  No murmur or gallop.   Abdomen: Abdomen is soft.    Extremities: Normal range of motion.  There is no deformity, dependent edema or local swelling.    Pulses: Distal pulses are intact.    Neurological: Patient is alert.                Results Review:     I reviewed the patient's new clinical results.    WBC WBC   Date Value Ref Range Status   01/03/2025 8.23 3.40 - 10.80 10*3/mm3 Final   01/02/2025 9.71 3.40 - 10.80 10*3/mm3 Final   01/02/2025 10.48 3.40 - 10.80 10*3/mm3 Final   01/01/2025 13.32 (H) 3.40 - 10.80 10*3/mm3 Final      HGB Hemoglobin   Date Value Ref Range Status   01/03/2025 7.5 (L) 12.0 - 15.9 g/dL Final   01/02/2025 8.5 (L) 12.0 - 15.9 g/dL Final   01/02/2025 8.4 (L) 12.0 - 15.9 g/dL Final   01/01/2025 9.3  "(L) 12.0 - 15.9 g/dL Final      HCT Hematocrit   Date Value Ref Range Status   01/03/2025 25.2 (L) 34.0 - 46.6 % Final   01/02/2025 28.0 (L) 34.0 - 46.6 % Final   01/02/2025 28.1 (L) 34.0 - 46.6 % Final   01/01/2025 31.2 (L) 34.0 - 46.6 % Final      Platlets No results found for: \"LABPLAT\"   MCV MCV   Date Value Ref Range Status   01/03/2025 96.9 79.0 - 97.0 fL Final   01/02/2025 95.6 79.0 - 97.0 fL Final   01/02/2025 97.6 (H) 79.0 - 97.0 fL Final   01/01/2025 96.9 79.0 - 97.0 fL Final          Sodium Sodium   Date Value Ref Range Status   01/03/2025 142 136 - 145 mmol/L Final   01/03/2025 142 136 - 145 mmol/L Final   01/03/2025 142 136 - 145 mmol/L Final   01/03/2025 143 136 - 145 mmol/L Final   01/02/2025 134 (L) 136 - 145 mmol/L Final   01/02/2025 137 136 - 145 mmol/L Final   01/02/2025 136 136 - 145 mmol/L Final   01/02/2025 137 136 - 145 mmol/L Final   01/02/2025 136 136 - 145 mmol/L Final   01/02/2025 136 136 - 145 mmol/L Final   01/01/2025 136 136 - 145 mmol/L Final   01/01/2025 133 (L) 136 - 145 mmol/L Final      Potassium Potassium   Date Value Ref Range Status   01/03/2025 4.2 3.5 - 5.2 mmol/L Final   01/03/2025 4.2 3.5 - 5.2 mmol/L Final   01/03/2025 4.2 3.5 - 5.2 mmol/L Final   01/03/2025 4.4 3.5 - 5.2 mmol/L Final   01/02/2025 4.7 3.5 - 5.2 mmol/L Final   01/02/2025 5.7 (H) 3.5 - 5.2 mmol/L Final   01/02/2025 6.1 (C) 3.5 - 5.2 mmol/L Final   01/02/2025 6.1 (C) 3.5 - 5.2 mmol/L Final   01/02/2025 7.0 (C) 3.5 - 5.2 mmol/L Final     Comment:     Slight hemolysis detected by analyzer. Result may be falsely elevated.   01/02/2025 6.6 (C) 3.5 - 5.2 mmol/L Final   01/01/2025 7.2 (C) 3.5 - 5.2 mmol/L Final     Comment:     Slight hemolysis detected by analyzer. Result may be falsely elevated.   01/01/2025 8.2 (C) 3.5 - 5.2 mmol/L Final      Chloride Chloride   Date Value Ref Range Status   01/03/2025 105 98 - 107 mmol/L Final   01/03/2025 105 98 - 107 mmol/L Final   01/03/2025 105 98 - 107 mmol/L Final "   01/03/2025 105 98 - 107 mmol/L Final   01/02/2025 98 98 - 107 mmol/L Final   01/02/2025 100 98 - 107 mmol/L Final   01/02/2025 100 98 - 107 mmol/L Final   01/02/2025 100 98 - 107 mmol/L Final   01/02/2025 101 98 - 107 mmol/L Final   01/02/2025 99 98 - 107 mmol/L Final   01/01/2025 101 98 - 107 mmol/L Final   01/01/2025 98 98 - 107 mmol/L Final      CO2 CO2   Date Value Ref Range Status   01/03/2025 24.0 22.0 - 29.0 mmol/L Final   01/03/2025 24.0 22.0 - 29.0 mmol/L Final   01/03/2025 21.0 (L) 22.0 - 29.0 mmol/L Final   01/03/2025 27.0 22.0 - 29.0 mmol/L Final   01/02/2025 22.0 22.0 - 29.0 mmol/L Final   01/02/2025 22.0 22.0 - 29.0 mmol/L Final   01/02/2025 21.0 (L) 22.0 - 29.0 mmol/L Final   01/02/2025 22.6 22.0 - 29.0 mmol/L Final   01/02/2025 22.3 22.0 - 29.0 mmol/L Final   01/02/2025 20.4 (L) 22.0 - 29.0 mmol/L Final   01/01/2025 19.5 (L) 22.0 - 29.0 mmol/L Final   01/01/2025 19.5 (L) 22.0 - 29.0 mmol/L Final      BUN BUN   Date Value Ref Range Status   01/03/2025 28 (H) 6 - 20 mg/dL Final   01/03/2025 28 (H) 6 - 20 mg/dL Final   01/03/2025 29 (H) 6 - 20 mg/dL Final   01/03/2025 31 (H) 6 - 20 mg/dL Final   01/02/2025 34 (H) 6 - 20 mg/dL Final   01/02/2025 37 (H) 6 - 20 mg/dL Final   01/02/2025 39 (H) 6 - 20 mg/dL Final   01/02/2025 40 (H) 6 - 20 mg/dL Final   01/02/2025 41 (H) 6 - 20 mg/dL Final   01/02/2025 41 (H) 6 - 20 mg/dL Final   01/01/2025 40 (H) 6 - 20 mg/dL Final   01/01/2025 40 (H) 6 - 20 mg/dL Final      Creatinine Creatinine   Date Value Ref Range Status   01/03/2025 1.29 (H) 0.57 - 1.00 mg/dL Final   01/03/2025 1.29 (H) 0.57 - 1.00 mg/dL Final   01/03/2025 1.26 (H) 0.57 - 1.00 mg/dL Final   01/03/2025 1.25 (H) 0.57 - 1.00 mg/dL Final   01/02/2025 1.38 (H) 0.57 - 1.00 mg/dL Final   01/02/2025 1.61 (H) 0.57 - 1.00 mg/dL Final   01/02/2025 1.69 (H) 0.57 - 1.00 mg/dL Final   01/02/2025 2.39 (H) 0.57 - 1.00 mg/dL Final   01/02/2025 2.50 (H) 0.57 - 1.00 mg/dL Final   01/02/2025 3.02 (H) 0.57 - 1.00 mg/dL  "Final   01/01/2025 2.88 (H) 0.57 - 1.00 mg/dL Final   01/01/2025 2.85 (H) 0.57 - 1.00 mg/dL Final      Calcium Calcium   Date Value Ref Range Status   01/03/2025 8.8 8.6 - 10.5 mg/dL Final   01/03/2025 8.8 8.6 - 10.5 mg/dL Final   01/03/2025 8.9 8.6 - 10.5 mg/dL Final   01/03/2025 9.0 8.6 - 10.5 mg/dL Final   01/02/2025 8.7 8.6 - 10.5 mg/dL Final   01/02/2025 8.7 8.6 - 10.5 mg/dL Final   01/02/2025 8.8 8.6 - 10.5 mg/dL Final   01/02/2025 8.1 (L) 8.6 - 10.5 mg/dL Final   01/02/2025 8.4 (L) 8.6 - 10.5 mg/dL Final   01/02/2025 8.4 (L) 8.6 - 10.5 mg/dL Final   01/01/2025 8.2 (L) 8.6 - 10.5 mg/dL Final   01/01/2025 8.3 (L) 8.6 - 10.5 mg/dL Final      PO4 No results found for: \"CAPO4\"   Albumin Albumin   Date Value Ref Range Status   01/03/2025 3.3 (L) 3.5 - 5.2 g/dL Final   01/03/2025 3.3 (L) 3.5 - 5.2 g/dL Final   01/02/2025 3.4 (L) 3.5 - 5.2 g/dL Final   01/02/2025 3.5 3.5 - 5.2 g/dL Final   01/01/2025 3.5 3.5 - 5.2 g/dL Final      Magnesium Magnesium   Date Value Ref Range Status   01/03/2025 2.0 1.6 - 2.6 mg/dL Final   01/03/2025 2.0 1.6 - 2.6 mg/dL Final   01/02/2025 1.7 1.6 - 2.6 mg/dL Final   01/02/2025 1.8 1.6 - 2.6 mg/dL Final   01/02/2025 2.0 1.6 - 2.6 mg/dL Final   01/01/2025 1.5 (L) 1.6 - 2.6 mg/dL Final      Uric Acid Uric Acid   Date Value Ref Range Status   01/02/2025 7.6 (H) 2.4 - 5.7 mg/dL Final     Comment:     Falsely depressed results may occur on samples drawn from patients receiving N-Acetylcysteine (NAC) or Metamizole.        Medication Review: Yes    Assessment & Plan       Acute respiratory failure with hypercapnia    MARIAA (acute kidney injury)    Type 2 diabetes mellitus with hyperglycemia    Hyperkalemia    Sepsis    Respiratory acidosis with metabolic acidosis    Acute respiratory failure      Assessment & Plan:    1-MARIAA- non oliguric - Pre-renal azotemia vs sepsis related MARIAA. UA - RBC 3-5, + protein. 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 At home on lisinopril, metformin and " topiramate. Improving with IV hydration and hemodynamic support.   2- Hyperkalemia - improving - with lokelma and bicarb infusion  3- Hyperglycemia   4- Anemia   5- Shock   6- hx of Factor V Leiden deficiency   7- hx of DM   8- Respiratory distress - suspicion of aspiration pneumonia   9- Hx of Gout      Plan:  -  Try fluid challenge @ 500cc/hr bolus due to drop in UOP this afternoon.   - Avoid nephrotoxic agents.   - Renal diet   - Adjust meds per renal function   - No emergent need of RRT   - Monitor H/H and transfuse for Hgb less than 7.0   - Urine lytes     Stanley Velasco MD  01/03/25  15:02 EST

## 2025-01-03 NOTE — PLAN OF CARE
Goal Outcome Evaluation:         Unable to wean patient from ventilatory support at this time.  Will continue to wean as able.

## 2025-01-03 NOTE — CASE MANAGEMENT/SOCIAL WORK
Discharge Planning Assessment  [facility]     Patient Name: Natalia Arzate  MRN: [id number]  Today's Date: 1/3/2025    Admit Date: 1/2/2025    Plan: IDP   Discharge Needs Assessment       Row Name 01/03/25 1336       Living Environment    People in Home child(caroline), adult    Name(s) of People in Home Milan Arzate, Son    Current Living Arrangements apartment    Potentially Unsafe Housing Conditions patient unable to answer    In the past 12 months has the electric, gas, oil, or water company threatened to shut off services in your home? Pt Unable    Primary Care Provided by child(caroline)    Provides Primary Care For no one, unable/limited ability to care for self    Family Caregiver if Needed child(caroline), adult;parent(s)    Family Caregiver Names Faina Arzate (Mother)  [phone] (Home Phone)    Quality of Family Relationships supportive    Able to Return to Prior Arrangements yes       Resource/Environmental Concerns    Resource/Environmental Concerns none    Transportation Concerns none       Transportation Needs    In the past 12 months, has lack of transportation kept you from medical appointments or from getting medications? Pt Unable    In the past 12 months, has lack of transportation kept you from meetings, work, or from getting things needed for daily living? Pt Unable       Food Insecurity    Within the past 12 months, you worried that your food would run out before you got the money to buy more. Pt Unable    Within the past 12 months, the food you bought just didn't last and you didn't have money to get more. Pt Unable       Transition Planning    Patient/Family Anticipates Transition to inpatient rehabilitation facility    Patient/Family Anticipated Services at Transition rehabilitation services    Transportation Anticipated health plan transportation       Discharge Needs Assessment    Equipment Currently Used at Home wheelchair;walker, rolling;oxygen    Concerns to be Addressed discharge  planning    Do you want help finding or keeping work or a job? Patient unable to answer    Do you want help with school or training? For example, starting or completing job training or getting a high school diploma, GED or equivalent Patient unable to answer    Anticipated Changes Related to Illness inability to care for self    Equipment Needed After Discharge none    Discharge Facility/Level of Care Needs rehabilitation facility    Current Discharge Risk chronically ill;dependent with mobility/activities of daily living                   Discharge Plan       Row Name 01/03/25 2117       Plan    Plan IDP    Patient/Family in Agreement with Plan yes    Plan Comments Patient is on the vent and unable to participate in coversation today. I spoke with the patient's mother, Faina, and the patient's son, Milan, by phone. Faina report that Ms Arzate lives in [facility] in an apartment with her son. The patient's boyfriend also stays there some as well. Ms Arzate is dependent on her son and boyfriend for ADLs. She uses a wheelchair, walker, and oxygen PRN at home. Faina and Milan do not know the name of the oxygen provider. Faina confirmed the patient's insurance is [employer] and her PCP is Bladimir Calle. Faina stated that Ms Arzate has been on the vent before but has not been to rehab. No reported home or outpatient services. When appropriate, will appreciate therapy recommendations. CM following.                  Continued Care and Services - Admitted Since 1/2/2025    No active coordination exists for this encounter.          Demographic Summary    No documentation.                  Functional Status       Row Name 01/03/25 8892       Functional Status    Usual Activity Tolerance fair    Current Activity Tolerance other (see comments)  mechanical vent       Physical Activity    On average, how many days per week do you engage in moderate to strenuous exercise (like a brisk walk)? Pt  Unable    On average, how many minutes do you engage in exercise at this level? Pt Unable       Assessment of Health Literacy    How often do you have someone help you read hospital materials? Occasionally    How often do you have problems learning about your medical condition because of difficulty understanding written information? Occasionally    How often do you have a problem understanding what is told to you about your medical condition? Occasionally    How confident are you filling out medical forms by yourself? Somewhat    Health Literacy Moderate       Functional Status, IADL    Medications assistive equipment and person    Meal Preparation assistive equipment and person    Housekeeping assistive equipment and person    Laundry assistive equipment and person    Shopping completely dependent    If for any reason you need help with day-to-day activities such as bathing, preparing meals, shopping, managing finances, etc., do you get the help you need? Patient unable to answer       Employment/    Employment Status disabled                   Psychosocial    No documentation.                  Abuse/Neglect    No documentation.                  Legal    No documentation.                  Substance Abuse    No documentation.                  Patient Forms    No documentation.                     Valeria Garcia RN

## 2025-01-03 NOTE — PROGRESS NOTES
Pharmacy Consult - Vancomycin Dosing and Monitoring (Renal Dysfunction / Dialysis)    Natalia Arzate is a 39 y.o. female receiving vancomycin therapy.     Indication:  Empiric, Potential Pneumonia  Consulting Provider:   Intensivist  ID Consult:  No    Goal Trough:  15-20 mcg/mL    Current Antimicrobial Therapy  Anti-Infectives (From admission, onward)      Ordered     Dose/Rate Route Frequency Start Stop    01/02/25 1548  vancomycin (dosing per levels)  Status:  Discontinued        Ordering Provider: David Lzaaro PharmD     Not Applicable Daily 01/03/25 0900 01/03/25 0738    01/03/25 0738  Vancomycin HCl 1,250 mg in sodium chloride 0.9 % 250 mL VTB        Ordering Provider: David Lazaro PharmD    1,250 mg  200 mL/hr over 75 Minutes Intravenous Every 12 Hours 01/03/25 0900 01/08/25 0859    01/02/25 1231  cefepime 2000 mg IVPB in 100 mL NS (MBP)        Ordering Provider: Bettie Walter APRN    2,000 mg  over 4 Hours Intravenous Every 12 Hours 01/02/25 2100 01/06/25 2059 01/02/25 1952  metroNIDAZOLE (FLAGYL) IVPB 500 mg        Ordering Provider: Roly Garcia MD    500 mg  200 mL/hr over 30 Minutes Intravenous Every 8 Hours 01/02/25 2100 01/09/25 2059    01/02/25 1547  vancomycin (VANCOCIN) 1,000 mg in sodium chloride 0.9 % 250 mL IVPB-VTB        Ordering Provider: David Lazaro PharmD    1,000 mg  250 mL/hr over 60 Minutes Intravenous Once 01/02/25 1645 01/02/25 1811    01/02/25 1511  Pharmacy to dose vancomycin        Ordering Provider: David Lazaro PharmD     Not Applicable Continuous PRN 01/02/25 1509 01/09/25 1508    01/02/25 1235  cefepime 2000 mg IVPB in 100 mL NS (MBP)        Ordering Provider: Bettie Walter APRN    2,000 mg  over 30 Minutes Intravenous Once 01/02/25 1330 01/02/25 1317    01/02/25 1228  cefTRIAXone (ROCEPHIN) 2,000 mg in sodium chloride 0.9 % 100 mL MBP  Status:  Discontinued        Ordering Provider: Knoll, Bettie E, APRN    2,000 mg  200 mL/hr over 30  Minutes Intravenous Every 24 Hours 01/02/25 1315 01/02/25 1230          Allergies  Allergies as of 01/02/2025 - Reviewed 01/02/2025   Allergen Reaction Noted    Salvia officinalis Itching, Other (See Comments), and Shortness Of Breath 03/02/2023    Shellfish allergy Anaphylaxis 12/30/2018    Toradol [ketorolac tromethamine] Anaphylaxis and Hives 06/27/2016    Tree nut Anaphylaxis and Shortness Of Breath 03/16/2022    Haldol [haloperidol] Hives and Mental Status Change 03/04/2020    Tramadol Hives and Swelling 12/08/2018    Amoxicillin Hives and Rash 06/27/2016    Penicillins Hives and Rash 06/27/2016     Labs  Results from last 7 days   Lab Units 01/03/25  0433 01/03/25  0018 01/02/25 2021   BUN mg/dL 28*  28* 31* 34*   CREATININE mg/dL 1.29*  1.29* 1.25* 1.38*     Results from last 7 days   Lab Units 01/03/25  0433 01/02/25  1314 01/02/25  0900   WBC 10*3/mm3 8.23 9.71 10.48     Evaluation of Dosing     Last Dose Received in the ED/Outside Facility: 2500mg IV x 1 given 1/2 at ~2100  Is Patient on Dialysis or Renal Replacement: No; potential RRT    Height -    Weight -      Estimated Creatinine Clearance: 90 mL/min (A) (by C-G formula based on SCr of 1.29 mg/dL (H)).    Intake & Output (last 2 days)         01/01 0701 01/02 0700 01/02 0701  01/03 0700 01/03 0701  01/04 0700    I.V.  2294.1 1017    Other  100     IV Piggyback  470.8     Total Intake  2864.9 1017    Urine  3500 60    Total Output  3500 60    Net  -635.1 +957                 Microbiology  Microbiology Results (last 10 days)       Procedure Component Value - Date/Time    Eosinophil Smear - Urine, Indwelling Urethral Catheter [[id number]]  (Normal) Collected: 01/02/25 1542    Lab Status: Final result Specimen: Urine from Indwelling Urethral Catheter Updated: 01/02/25 1700     Eosinophil Smear 0 % EOS/100 Cells     Narrative:      No eosinophil seen    MRSA Screen, PCR (Inpatient) - Swab, Nares [[id number]]  (Abnormal) Collected: 01/02/25 1246    Lab  Status: Final result Specimen: Swab from Nares Updated: 01/02/25 1504     MRSA PCR Positive    Narrative:      The negative predictive value of this diagnostic test is high and should only be used to consider de-escalating anti-MRSA therapy. A positive result may indicate colonization with MRSA and must be correlated clinically.    Urine Culture - Urine, Indwelling Urethral Catheter [[id number]]  (Normal) Collected: 01/02/25 1213    Lab Status: Preliminary result Specimen: Urine from Indwelling Urethral Catheter Updated: 01/03/25 0932     Urine Culture No growth    S. Pneumo Ag Urine or CSF - Urine, Urine, Clean Catch [[id number]]  (Normal) Collected: 01/02/25 1213    Lab Status: Final result Specimen: Urine, Clean Catch Updated: 01/02/25 1913     Strep Pneumo Ag Negative    Legionella Antigen, Urine - Urine, Urine, Clean Catch [[id number]]  (Normal) Collected: 01/02/25 1213    Lab Status: Final result Specimen: Urine, Clean Catch Updated: 01/02/25 1913     LEGIONELLA ANTIGEN, URINE Negative    Respiratory Culture - Sputum, ET Suction [[id number]]  (Abnormal) Collected: 01/02/25 1212    Lab Status: Preliminary result Specimen: Sputum from ET Suction Updated: 01/03/25 1034     Respiratory Culture Moderate growth (3+) Staphylococcus aureus      No Normal Respiratory Margo     Gram Stain Many (4+) WBCs per low power field      Rare (1+) Epithelial cells per low power field      Many (4+) Gram positive cocci in pairs and clusters    Blood Culture - Blood, Wrist, Right [[id number]]  (Abnormal) Collected: 01/01/25 2039    Lab Status: Preliminary result Specimen: Blood from Wrist, Right Updated: 01/02/25 2241     Blood Culture Abnormal Stain     Gram Stain Gram positive cocci in clusters    Blood Culture - Blood, Wrist, Left [[id number]]  (Abnormal) Collected: 01/01/25 2039    Lab Status: Preliminary result Specimen: Blood from Wrist, Left Updated: 01/03/25 0825     Blood Culture Gram Positive Cocci     Isolated from Anaerobic  Bottle     Gram Stain Anaerobic Bottle Gram positive cocci in clusters    Blood Culture ID, PCR - Blood, Wrist, Left [[id number]] Collected: 01/01/25 2039    Lab Status: Final result Specimen: Blood from Wrist, Left Updated: 01/02/25 1604     BCID, PCR Negative by BCID PCR. Culture to Follow.     BOTTLE TYPE Anaerobic Bottle    Respiratory Panel PCR w/COVID-19(SARS-CoV-2) [facility] In-House, NP Swab in UTM/VTM, 2 HR TAT - Swab, Nasopharynx [[id number]]  (Normal) Collected: 01/01/25 2023    Lab Status: Final result Specimen: Swab from Nasopharynx Updated: 01/01/25 2201     ADENOVIRUS, PCR Not Detected     Coronavirus 229E Not Detected     Coronavirus HKU1 Not Detected     Coronavirus NL63 Not Detected     Coronavirus OC43 Not Detected     COVID19 Not Detected     Human Metapneumovirus Not Detected     Human Rhinovirus/Enterovirus Not Detected     Influenza A PCR Not Detected     Influenza B PCR Not Detected     Parainfluenza Virus 1 Not Detected     Parainfluenza Virus 2 Not Detected     Parainfluenza Virus 3 Not Detected     Parainfluenza Virus 4 Not Detected     RSV, PCR Not Detected     Bordetella pertussis pcr Not Detected     Bordetella parapertussis PCR Not Detected     Chlamydophila pneumoniae PCR Not Detected     Mycoplasma pneumo by PCR Not Detected    Narrative:      In the setting of a positive respiratory panel with a viral infection PLUS a negative procalcitonin without other underlying concern for bacterial infection, consider observing off antibiotics or discontinuation of antibiotics and continue supportive care. If the respiratory panel is positive for atypical bacterial infection (Bordetella pertussis, Chlamydophila pneumoniae, or Mycoplasma pneumoniae), consider antibiotic de-escalation to target atypical bacterial infection.          Vancomycin Levels  Results from last 7 days   Lab Units 01/03/25  0433 01/02/25  1314   VANCOMYCIN RM mcg/mL 13.40 13.90               Projected AUC:  544  mg/L*hr    Assessment/Plan:  Vancomycin dosing reviewed.  Will increase dose to 1250mg IV q12h.  This is about 12 mg/kg of her adjusted body weight.  Level check 1/5 with AM labs.  PK evaluation to follow.  Of note patient has solitary kidney and currently improving renal function.  Will increase cefepime to 2g IV q8h with improvement in renal function.  Pharmacy will continue to follow and adjust dose based on renal function, drug levels, and clinical status.    Thanks,  David Lazaro, PharmD, BCPS, BCCCP  01/03/25  11:11 EST

## 2025-01-03 NOTE — PAYOR COMM NOTE
"Mago Arzate (39 y.o. Female)       Date of Birth   1985    Social Security Number       Address   [address] [facility] [address]    Home Phone   [phone]    MRN   [id number]       [facility]    Marital Status                               Admission Date   1/2/25    Admission Type   Urgent    Admitting Provider   Roly Garcia MD    Attending Provider   Roly Garcia MD    Department, Room/Bed   [facility] 2A ICU, N219/1       Discharge Date       Discharge Disposition       Discharge Destination                                 Attending Provider: Roly Garcia MD    Allergies: Salvia Officinalis, Shellfish Allergy, Toradol [Ketorolac Tromethamine], Tree Nut, Haldol [Haloperidol], Tramadol, Amoxicillin, Penicillins    Isolation: None   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)   Code Status: CPR    Ht: 162.6 cm (64.02\")   Wt: 159 kg (350 lb)    Admission Cmt: None   Principal Problem: Acute respiratory failure with hypercapnia [J96.02]                   Active Insurance as of 1/2/2025       Primary Coverage       Payor Plan Insurance Group Employer/Plan Group    [place]        Payor Plan Address Payor Plan Phone Number Payor Plan Fax Number Effective Dates    [address]   4/1/2016 - None Entered    [facility] [id number]         Subscriber Name Subscriber Birth Date Member ID       MAGO ARZATE 1985 [id number]                     Emergency Contacts        (Rel.) Home Phone Work Phone Mobile Phone    Faina Arzate (Mother) [phone] -- --    Milan Arzate (Son) [phone] -- [phone]              [place]: [facility] [id number] Tax ID [id number]  Insurance Information                  [place] Phone: --    Subscriber: Mago Arzate Subscriber#: [id number]    Group#: -- Precert#: --    Authorization#: [id number] Effective Date: --             History " & Physical        Roly Garcia MD at 01/02/25 0832          Pulmonary/Critical Care History and Physical Exam     LOS: 0 days   Patient Care Team:  Bladimir Calle PA-C as PCP - General (Physician Assistant)    Chief Complaint: AMS    Subjective    HPI:     Ms. Arzate is a 39 year old female with a pmhx of Afib, diabetes, h/o PE/DVT, Factor 5 Leiden mutation, GERD, HLD, h/o stroke and hypothyroidism who presented to [facility] with altered mental status and being difficult to arouse per family. Upon arrival to [facility], workup was notable for: negative respiratory panel, leukocytosis (WBC 13.32). ProBNP 5671, hgb 10.2-->8.4, lactate 2.9,  potassium 8.2, creatinine 2.85, BUN 40, glucose 481, and Procal 6.26. CT head without acute process. CT chest with small left pleural effusion, bibasliar consolidation and atelectasis. CT A/P without contrast showed severe enlargement of the liver, s/p left nephrectomy. Initial ABG showed pH 7.048, pCO2 75, pO2 54, HCO3 21. Patient was intubated. Repeat ABG without significant improvement (pH 7.018, pCO2 79.3, pO2 83.6, HCO3 20.4). Nephrology was consulted and ordered for patient to receive 20 units of Regular insulin IVP x 2 and initiation of an insulin gtt with improvement of potassium to 6.1. Patient also became hypotensive and required Levophed gtt. An UDS was ordered after admission and was positive for opioids. She was given two doses of Narcan on two separate occasions without a significant change in mentation. The decision was made to transfer the patient to our facility for ICU admission and higher level of care.    Time spent: 8 minutes. Electronically signed by YEIMI Aragon, 01/02/25, 11:12 AM EST.    Patient seen on arrival to the ICU.  She is unresponsive on mechanical ventilation and is unable to relay any history.  History per outside records as above.    History taken from: record    Past Medical History:   Diagnosis Date    A-fib      Abnormal ECG     Anemia     Anxiety     Asthma     Cancer     Ovarian    Depression     Diabetes mellitus     DVT (deep venous thrombosis)     Factor 5 Leiden mutation, heterozygous     Fibroid     GERD (gastroesophageal reflux disease)     Gout     H/O abdominal abscess     History of sepsis     History of transfusion     Hyperlipidemia     Hypothyroid     Kidney stone     Migraines     Neuropathy     Ovarian cancer 2021    Ovarian cyst     PE (pulmonary embolism)     Polycystic ovary syndrome     Preeclampsia     Rh incompatibility     Stroke     TIA (transient ischemic attack)     Urinary tract infection     Varicella        Past Surgical History:   Procedure Laterality Date    ABDOMINAL SURGERY      CARDIAC CATHETERIZATION       SECTION      CHOLECYSTECTOMY      COLONOSCOPY      ENDOSCOPY      EXTRACORPOREAL SHOCK WAVE LITHOTRIPSY (ESWL) Left 10/22/2021    Procedure: EXTRACORPOREAL SHOCKWAVE LITHOTRIPSY;  Surgeon: Milan Motley MD;  Location: [facility];  Service: Urology;  Laterality: Left;    HYSTERECTOMY  2017    ovarian ca    LITHOTRIPSY      NEPHRECTOMY Left 10/2022    RIGHT OOPHORECTOMY      URETEROSCOPY LASER LITHOTRIPSY WITH STENT INSERTION Left 10/01/2021    Procedure: URETEROSCOPY WITH STENT PLACEMENT;  Surgeon: Milan Motley MD;  Location: [facility];  Service: Urology;  Laterality: Left;    URETEROSCOPY LASER LITHOTRIPSY WITH STENT INSERTION Left 2022    Procedure: URETEROSCOPY STENT REMOVAL;  Surgeon: Milan Motley MD;  Location: [facility] OR;  Service: Urology;  Laterality: Left;    URETEROSCOPY LASER LITHOTRIPSY WITH STENT INSERTION Left 2022    Procedure: CYSTOSCOPY RETROGRADE LEFT WITH STENT PLACEMENT;  Surgeon: Milan Motley MD;  Location: [facility];  Service: Urology;  Laterality: Left;       Family History   Problem Relation Age of Onset    Hypertension Mother     Diabetes Father     Hypertension Father     Heart attack Father     No  "Known Problems Sister     No Known Problems Brother     No Known Problems Daughter     No Known Problems Son     No Known Problems Maternal Grandmother     No Known Problems Paternal Grandmother     Breast cancer Paternal Aunt     No Known Problems Maternal Grandfather     No Known Problems Paternal Grandfather     No Known Problems Cousin     Rheum arthritis Neg Hx     Osteoarthritis Neg Hx     Asthma Neg Hx     Heart failure Neg Hx     Hyperlipidemia Neg Hx     Migraines Neg Hx     Rashes / Skin problems Neg Hx     Seizures Neg Hx     Stroke Neg Hx     Thyroid disease Neg Hx        Social History     Socioeconomic History    Marital status:    Tobacco Use    Smoking status: Never     Passive exposure: Never    Smokeless tobacco: Never   Vaping Use    Vaping status: Never Used   Substance and Sexual Activity    Alcohol use: No    Drug use: Never     Comment: Pt has track marks on right arm. Pt states \"It's from my INR being drawn.\"     Sexual activity: Not Currently     Partners: Male     Birth control/protection: None       Allergies   Allergen Reactions    Salvia Officinalis Itching, Other (See Comments) and Shortness Of Breath    Shellfish Allergy Anaphylaxis    Toradol [Ketorolac Tromethamine] Anaphylaxis and Hives    Tree Nut Anaphylaxis and Shortness Of Breath     WALNUTS    Haldol [Haloperidol] Hives and Mental Status Change    Tramadol Hives and Swelling    Amoxicillin Hives and Rash    Penicillins Hives and Rash       Past Medical History/Family History/Social History were reviewed by me and updates were made.     Review of Systems:    Review of 14 systems was completed with positives and pertinent negatives noted in the subjective section.  All other systems reviewed and are negative.         Objective    Vital Signs  Temp:  [98.2 °F (36.8 °C)-104 °F (40 °C)] 100.1 °F (37.8 °C)  Heart Rate:  [] 108  Resp:  [20-35] 22  BP: ()/(38-88) 121/51  Arterial Line BP: (101-149)/(52-85) " 103/63  FiO2 (%):  [70 %-100 %] 70 %  There is no height or weight on file to calculate BMI.  Mode: VC+/AC  FiO2 (%):  [70 %-100 %] 70 %  S RR:  [16-26] 22  S VT:  [350 mL-450 mL] 385 mL  PEEP/CPAP (cm H2O):  [5 cm H20-14 cm H20] 14 cm H20  MAP (cm H2O):  [10-20] 18    Intake/Output Summary (Last 24 hours) at 1/2/2025 1953  Last data filed at 1/2/2025 1808  Gross per 24 hour   Intake 1017.42 ml   Output 1700 ml   Net -682.58 ml      Physical Exam:     General Appearance:    Alert, cooperative, in no acute distress   Head:    Normocephalic, without obvious abnormality, atraumatic   Eyes:            Lids and lashes normal, conjunctivae and sclerae normal,    no icterus, no pallor, corneas clear, PERRL   ENMT:   Ears appear intact with no abnormalities noted      No oral lesions, no thrush, oral mucosa moist   Neck:   No adenopathy, supple, trachea midline, no thyromegaly,      no carotid bruit, no JVD   Lungs/resp:     Normal effort, symmetric chest rise, no crepitus, clear to      auscultation bilaterally, no chest wall tenderness                  Heart/CV:    Regular rhythm and normal rate, normal S1 and S2, no         murmur   Abdomen/GI:     Normal bowel sounds, no masses, no organomegaly, soft,    nontender, nondistended   G/U:     Deferred   Extremities/MSK:   No clubbing, cyanosis or edema.  Normal tone.  No deformities.    Pulses:   Pulses palpable and equal bilaterally   Skin:   No bleeding, bruising or rash   Hem/Lymph:   No cervical or supraclavicular adenopathy.    Neurologic:   Moves all extremities with no obvious focal motor deficit.  Cranial nerves 2 - 12 grossly intact            Psychiatric:  Normal mood and affect, oriented x 3.     The above findings are documentation of my personal physical exam findings from today.   Electronically signed by:  Roly Garcia MD  01/02/25  19:53 EST     Results Review:     I reviewed the patient's new clinical results.   Results from last 7 days   Lab Units  01/02/25  1538 01/02/25  1314 01/02/25  0900 01/02/25  0453 01/01/25  2333 01/01/25  2145   SODIUM mmol/L 137 136 137 136   < > 133*   POTASSIUM mmol/L 5.7* 6.1* 6.1* 7.0*   < > 8.2*   CHLORIDE mmol/L 100 100 100 101   < > 98   CO2 mmol/L 22.0 21.0* 22.6 22.3   < > 19.5*   BUN mg/dL 37* 39* 40* 41*   < > 40*   CREATININE mg/dL 1.61* 1.69* 2.39* 2.50*   < > 2.85*   CALCIUM mg/dL 8.7 8.8 8.1* 8.4*   < > 8.3*   BILIRUBIN mg/dL  --   --  0.3 0.4  --  0.6   ALK PHOS U/L  --   --  137* 142*  --  129*   ALT (SGPT) U/L  --   --  71* 71*  --  60*   AST (SGOT) U/L  --   --  119* 136*  --  120*   GLUCOSE mg/dL 373* 377* 315* 314*   < > 481*    < > = values in this interval not displayed.     Results from last 7 days   Lab Units 01/02/25  1314 01/02/25  0900 01/01/25  2039   WBC 10*3/mm3 9.71 10.48 13.32*   HEMOGLOBIN g/dL 8.5* 8.4* 9.3*   HEMATOCRIT % 28.0* 28.1* 31.2*   PLATELETS 10*3/mm3 222 232 252     Results from last 7 days   Lab Units 01/02/25  1514   PH, ARTERIAL pH units 7.221*   PO2 ART mm Hg 98.1   PCO2, ARTERIAL mm Hg 59.8*   HCO3 ART mmol/L 24.5       I reviewed the patient's new imaging including images and reports.    CT chest 1/2/2025 shows bibasilar airspace opacities and cardiomegaly.  There is a small left pleural effusion.    CT abdomen and pelvis shows enlarged liver.    Radiologist impression follows:    IMPRESSION:     CT CHEST:  BIBASILAR PROBABLE PNEUMONIA AND ATELECTASIS WITH SMALL LEFT PLEURAL  EFFUSION.     CT ABDOMEN AND PELVIS:  SEVERELY ENLARGED LIVER WITH FATTY INFILTRATION.  STATUS POST LEFT NEPHRECTOMY.    CT head 1/2/2025:    IMPRESSION:     NO ACUTE INTRACRANIAL ABNORMALITY.       Medication Review:   albumin human, 25 g, Intravenous, BID  cefepime, 2,000 mg, Intravenous, Q12H  chlorhexidine, 15 mL, Mouth/Throat, Q12H  famotidine, 20 mg, Intravenous, BID  metroNIDAZOLE, 500 mg, Intravenous, Q8H  mupirocin, 1 Application, Each Nare, BID  senna-docusate sodium, 2 tablet, Nasogastric,  BID  sodium chloride, 10 mL, Intravenous, Q12H  sodium zirconium cyclosilicate, 10 g, Nasogastric, TID  [START ON 1/3/2025] vancomycin (dosing per levels), , Not Applicable, Daily      amiodarone, 1 mg/min, Last Rate: 1 mg/min (01/02/25 1731)   Followed by  amiodarone, 0.5 mg/min  fentanyl 10 mcg/mL,  mcg/hr, Last Rate: 100 mcg/hr (01/02/25 1808)  heparin, 9 Units/kg/hr, Last Rate: 9 Units/kg/hr (01/02/25 1543)  insulin, 0-100 Units/hr, Last Rate: 11 Units/hr (01/02/25 1904)  Midazolam 1 mg/mL 100mL NS, 1-10 mg/hr, Last Rate: 10 mg/hr (01/02/25 1705)  norepinephrine, 0.02-0.3 mcg/kg/min, Last Rate: 0.06 mcg/kg/min (01/02/25 1730)  Pharmacy to Dose Heparin,   Pharmacy to dose vancomycin,         Assessment & Plan    Active Hospital Problems    Diagnosis     **Acute respiratory failure with hypercapnia     Hyperkalemia     Sepsis     Respiratory acidosis with metabolic acidosis     Acute respiratory failure     Type 2 diabetes mellitus with hyperglycemia     MARIAA (acute kidney injury)      39 y.o. female with history of HLD, hypothyroidism, atrial fibrillation, history of PE/DVT, factor V Leiden mutation, stroke, presented to [facility] with AMS and difficult to arouse.  Evaluation there showed significant renal failure, hyperglycemia, hypercapnic respiratory failure, and basilar pulmonary infiltrates concerning for pneumonia.  She was intubated for respiratory failure and placed on pressors for hypotension.  She was treated with shifting agents for hyperkalemia.  She was given Narcan without significant response.  She was subsequently transferred to [facility] ICU for ongoing management.    Plan:  1.  For acute mixed hypoxemic and hypercapnic respiratory failure: Continue mechanical ventilation.  Adjust settings to optimize ventilation and oxygenation and minimize barotrauma.  2.  For pulmonary infiltrates/basilar pneumonia unknown organism: Worrisome for aspiration.  Follow-up cultures.  Broad-spectrum antibiotics with  cefepime plus Flagyl and vancomycin.  3.  For MARIAA: Consult nephrology.  Fluid resuscitation.  May need HD.  4.  For hyperkalemia: Status post shifting agents.  Monitor with resuscitation.  5.  For poorly controlled T2DM: Glucose monitoring and insulin drip protocol.  6.  For sepsis: Broad-spectrum antibiotics as above.  Pressors if needed.  Repeat lactic acid level.  7.  For history of atrial fibrillation/DVT/PE/factor V Leiden: Heparin drip for now but if hemoglobin stable transition to home Eliquis.  8.  For chronic anemia: Monitor.  Transfuse if necessary.    Patient is critically ill secondary multisystem organ failure and has high risk of life-threatening decline in condition.  As such, she requires continuous monitoring frequent reassessment for consideration of adjustment management in order to minimize that risk.  I personally reassessed her multiple times today.    Critical care time : 44 minutes spent by me personally.(This excludes time spent performing separately reportable procedures and services). Critical care time includes high complexity decision making to assess, manipulate, and support vital organ system failure in this individual who has impairment of one or more vital organ systems such that there is a high probability of imminent or life threatening deterioration in the patient’s condition.    Electronically signed by:  Roly Garcia MD  01/02/25  19:53 EST        Please note that portions of this note were completed with [employer] - a voice recognition program.     Electronically signed by Roly Garcia MD at 01/02/25 2011       Current Facility-Administered Medications   Medication Dose Route Frequency Provider Last Rate Last Admin    acetaminophen (TYLENOL) tablet 650 mg  650 mg Oral Q8H PRN Sergei Davis APRN   650 mg at 01/02/25 2228    amiodarone 360 mg in 200 mL D5W infusion  0.5 mg/min Intravenous Continuous Beronica Crump APRN 16.67 mL/hr at 01/03/25  1040 0.5 mg/min at 01/03/25 1040    sennosides-docusate (PERICOLACE) 8.6-50 MG per tablet 2 tablet  2 tablet Nasogastric BID Xochitl-Yue, Beronica B, APRN   2 tablet at 01/03/25 0824    And    polyethylene glycol (MIRALAX) packet 17 g  17 g Nasogastric Daily PRN Xochitl-Turner, Beronica B, APRN        And    bisacodyl (DULCOLAX) suppository 10 mg  10 mg Rectal Daily PRN Xochitl-Turner, Beronica B, APRN        cefepime 2000 mg IVPB in 100 mL NS (MBP)  2,000 mg Intravenous Q8H David Lazaro PharmD        chlorhexidine (PERIDEX) 0.12 % solution 15 mL  15 mL Mouth/Throat Q12H Xochitl-Turner, Beronica B, APRN   15 mL at 01/03/25 0825    dextrose (D50W) (25 g/50 mL) IV injection 10-50 mL  10-50 mL Intravenous Q15 Min PRN Xochitl-Yue, Beronica B, APRN        dextrose (GLUTOSE) oral gel 15 g  15 g Oral Q15 Min PRN Xochitl-Turner, Beronica B, APRN        famotidine (PEPCID) injection 20 mg  20 mg Intravenous BID Xochitl-Yue, Beronica B, APRN   20 mg at 01/03/25 0825    fentaNYL (SUBLIMAZE) bolus from bag 10 mcg/mL injection 50 mcg  50 mcg Intravenous Q30 Min PRN Xochitl-Turner, Beronica B, APRN        fentaNYL 2500 mcg/250 mL NS infusion   mcg/hr Intravenous Titrated Moustaphas-Turner, Beronica B, APRN 10 mL/hr at 01/02/25 1808 100 mcg/hr at 01/02/25 1808    glucagon (GLUCAGEN) injection 1 mg  1 mg Intramuscular Q15 Min PRN Moustaphas-Turner, Beronica B, APRN        heparin 92922 units/250 mL (100 units/mL) in 0.45 % NaCl infusion  17 Units/kg/hr Intravenous Titrated Bakari Dumont PharmD 27 mL/hr at 01/03/25 0620 17 Units/kg/hr at 01/03/25 0620    insulin regular 1 unit/mL in 0.9% sodium chloride (Glucommander)  0-100 Units/hr Intravenous Titrated Beronica Crump APRN 16.3 mL/hr at 01/03/25 1039 16.3 Units/hr at 01/03/25 1039    ipratropium (ATROVENT) nebulizer solution 0.5 mg  0.5 mg Nebulization Q4H PRN Bettie Walter, YEIMI        ipratropium-albuterol (DUO-NEB) nebulizer solution 3 mL  3 mL Nebulization Q4H PRN Bettie Walter, APRN         metroNIDAZOLE (FLAGYL) IVPB 500 mg  500 mg Intravenous Q8H Roly Garcia  mL/hr at 01/03/25 0424 500 mg at 01/03/25 0424    midazolam (VERSED) 100 mg in 100mL NS infusion  1-10 mg/hr Intravenous Titrated Beronica Crump APRN 10 mL/hr at 01/03/25 0155 10 mg/hr at 01/03/25 0155    mupirocin (BACTROBAN) 2 % nasal ointment 1 Application  1 Application Each Nare BID Beronica Crump APRN   1 Application at 01/03/25 0825    norepinephrine (LEVOPHED) 8 mg in 250 mL NS infusion (premix)  0.02-0.3 mcg/kg/min Intravenous Titrated Beronica Crump APRADAM 5.96 mL/hr at 01/03/25 0715 0.02 mcg/kg/min at 01/03/25 0715    Pharmacy to Dose Heparin   Not Applicable Continuous PRN Bettie Walter APRN        Pharmacy to dose vancomycin   Not Applicable Continuous PRN David Lazaro PharmD        sodium chloride 0.9 % flush 10 mL  10 mL Intravenous Q12H Beronica Crump APRN   10 mL at 01/03/25 0825    sodium chloride 0.9 % flush 10 mL  10 mL Intravenous PRN Beronica Crump APRADAM        sodium chloride 0.9 % infusion 40 mL  40 mL Intravenous PRN Beronica Crump APRADAM        sodium chloride 0.9 % infusion  100 mL/hr Intravenous Continuous Sergei Davis APRN 100 mL/hr at 01/03/25 0830 100 mL/hr at 01/03/25 0830    Vancomycin HCl 1,250 mg in sodium chloride 0.9 % 250 mL VTB  1,250 mg Intravenous Q12H David Lazaro PharmD 200 mL/hr at 01/03/25 0824 1,250 mg at 01/03/25 0824     Lab Results (last 24 hours)       Procedure Component Value Units Date/Time    POC Glucose Once [[id number]]  (Normal) Collected: 01/03/25 1038    Specimen: Blood Updated: 01/03/25 1039     Glucose 116 mg/dL     Respiratory Culture - Sputum, ET Suction [[id number]]  (Abnormal) Collected: 01/02/25 1212    Specimen: Sputum from ET Suction Updated: 01/03/25 1034     Respiratory Culture Moderate growth (3+) Staphylococcus aureus      No Normal Respiratory Margo     Gram Stain Many (4+) WBCs per  low power field      Rare (1+) Epithelial cells per low power field      Many (4+) Gram positive cocci in pairs and clusters    POC Glucose Once [[id number]]  (Normal) Collected: 01/03/25 0943    Specimen: Blood Updated: 01/03/25 0945     Glucose 122 mg/dL     Urine Culture - Urine, Indwelling Urethral Catheter [[id number]]  (Normal) Collected: 01/02/25 1213    Specimen: Urine from Indwelling Urethral Catheter Updated: 01/03/25 0932     Urine Culture No growth    POC Glucose Once [[id number]]  (Abnormal) Collected: 01/03/25 0835    Specimen: Blood Updated: 01/03/25 0836     Glucose 141 mg/dL     POC Glucose Once [[id number]]  (Normal) Collected: 01/03/25 0725    Specimen: Blood Updated: 01/03/25 0727     Glucose 129 mg/dL     POC Glucose Once [[id number]]  (Normal) Collected: 01/03/25 0616    Specimen: Blood Updated: 01/03/25 0618     Glucose 120 mg/dL     Phosphorus [[id number]]  (Normal) Collected: 01/03/25 0433    Specimen: Blood Updated: 01/03/25 0510     Phosphorus 2.7 mg/dL     Basic Metabolic Panel [[id number]]  (Abnormal) Collected: 01/03/25 0433    Specimen: Blood Updated: 01/03/25 0509     Glucose 147 mg/dL      BUN 28 mg/dL      Creatinine 1.29 mg/dL      Sodium 142 mmol/L      Potassium 4.2 mmol/L      Chloride 105 mmol/L      CO2 24.0 mmol/L      Calcium 8.8 mg/dL      BUN/Creatinine Ratio 21.7     Anion Gap 13.0 mmol/L      eGFR 54.6 mL/min/1.73     Narrative:      GFR Categories in Chronic Kidney Disease (CKD)      GFR Category          GFR (mL/min/1.73)    Interpretation  G1                     90 or greater         Normal or high (1)  G2                      60-89                Mild decrease (1)  G3a                   45-59                Mild to moderate decrease  G3b                   30-44                Moderate to severe decrease  G4                    15-29                Severe decrease  G5                    14 or less           Kidney failure          (1)In the absence of evidence of kidney  disease, neither GFR category G1 or G2 fulfill the criteria for CKD.    eGFR calculation 2021 CKD-EPI creatinine equation, which does not include race as a factor    Vancomycin, Random [[id number]]  (Normal) Collected: 01/03/25 0433    Specimen: Blood Updated: 01/03/25 0509     Vancomycin Random 13.40 mcg/mL     Narrative:      Therapeutic Ranges for Vancomycin    Vancomycin Random   5.0-40.0 mcg/mL  Vancomycin Trough   5.0-20.0 mcg/mL  Vancomycin Peak     20.0-40.0 mcg/mL    Comprehensive Metabolic Panel [[id number]]  (Abnormal) Collected: 01/03/25 0433    Specimen: Blood Updated: 01/03/25 0509     Glucose 147 mg/dL      BUN 28 mg/dL      Creatinine 1.29 mg/dL      Sodium 142 mmol/L      Potassium 4.2 mmol/L      Chloride 105 mmol/L      CO2 24.0 mmol/L      Calcium 8.8 mg/dL      Total Protein 7.2 g/dL      Albumin 3.3 g/dL      ALT (SGPT) 54 U/L      AST (SGOT) 63 U/L      Alkaline Phosphatase 145 U/L      Total Bilirubin 0.3 mg/dL      Globulin 3.9 gm/dL      Comment: Calculated Result        A/G Ratio 0.8 g/dL      BUN/Creatinine Ratio 21.7     Anion Gap 13.0 mmol/L      eGFR 54.6 mL/min/1.73     Narrative:      GFR Categories in Chronic Kidney Disease (CKD)      GFR Category          GFR (mL/min/1.73)    Interpretation  G1                     90 or greater         Normal or high (1)  G2                      60-89                Mild decrease (1)  G3a                   45-59                Mild to moderate decrease  G3b                   30-44                Moderate to severe decrease  G4                    15-29                Severe decrease  G5                    14 or less           Kidney failure          (1)In the absence of evidence of kidney disease, neither GFR category G1 or G2 fulfill the criteria for CKD.    eGFR calculation 2021 CKD-EPI creatinine equation, which does not include race as a factor    Magnesium [[id number]]  (Normal) Collected: 01/03/25 0433    Specimen: Blood Updated: 01/03/25 0509      Magnesium 2.0 mg/dL     POC Glucose Once [[id number]]  (Normal) Collected: 01/03/25 0508    Specimen: Blood Updated: 01/03/25 0509     Glucose 129 mg/dL     Heparin Anti-Xa [[id number]]  (Abnormal) Collected: 01/03/25 0433    Specimen: Blood Updated: 01/03/25 0458     Heparin Anti-Xa (UFH) 0.10 IU/ml     CBC & Differential [[id number]]  (Abnormal) Collected: 01/03/25 0433    Specimen: Blood Updated: 01/03/25 0443    Narrative:      The following orders were created for panel order CBC & Differential.  Procedure                               Abnormality         Status                     ---------                               -----------         ------                     CBC Auto Differential[[id number]]        Abnormal            Final result                 Please view results for these tests on the individual orders.    CBC Auto Differential [[id number]]  (Abnormal) Collected: 01/03/25 0433    Specimen: Blood Updated: 01/03/25 0443     WBC 8.23 10*3/mm3      RBC 2.60 10*6/mm3      Hemoglobin 7.5 g/dL      Hematocrit 25.2 %      MCV 96.9 fL      MCH 28.8 pg      MCHC 29.8 g/dL      RDW 18.0 %      RDW-SD 61.4 fl      MPV 9.3 fL      Platelets 196 10*3/mm3      Neutrophil % 76.4 %      Lymphocyte % 12.6 %      Monocyte % 9.6 %      Eosinophil % 0.6 %      Basophil % 0.2 %      Immature Grans % 0.6 %      Neutrophils, Absolute 6.28 10*3/mm3      Lymphocytes, Absolute 1.04 10*3/mm3      Monocytes, Absolute 0.79 10*3/mm3      Eosinophils, Absolute 0.05 10*3/mm3      Basophils, Absolute 0.02 10*3/mm3      Immature Grans, Absolute 0.05 10*3/mm3      nRBC 0.2 /100 WBC     Blood Gas, Arterial With Co-Ox [[id number]]  (Abnormal) Collected: 01/03/25 0425    Specimen: Arterial Blood Updated: 01/03/25 0425     Site Arterial Line     Apolinar's Test N/A     pH, Arterial 7.291 pH units      Comment: 84 Value below reference range        pCO2, Arterial 55.6 mm Hg      Comment: 83 Value above reference range        pO2, Arterial 69.1 mm  Hg      Comment: 84 Value below reference range        HCO3, Arterial 26.8 mmol/L      Base Excess, Arterial -0.1 mmol/L      Hemoglobin, Blood Gas 7.7 g/dL      Comment: 84 Value below reference range        Hematocrit, Blood Gas 23.6 %      Oxyhemoglobin 91.3 %      Comment: 84 Value below reference range        Methemoglobin 0.50 %      Carboxyhemoglobin 1.8 %      CO2 Content 28.5 mmol/L      Temperature 37.0     Barometric Pressure for Blood Gas --     Comment: N/A        Modality Ventilator     FIO2 60 %      Ventilator Mode VC+/AC     Set Tidal Volume 0.39     Rate 22 Breaths/minute      PEEP 14.0     PIP 0 cmH2O      Comment: Meter: [id number]     :  [id number]        IPAP 0     EPAP 0     pH, Temp Corrected 7.291 pH Units      pCO2, Temperature Corrected 55.6 mm Hg      pO2, Temperature Corrected 69.1 mm Hg     POC Glucose Once [[id number]]  (Abnormal) Collected: 01/03/25 0421    Specimen: Blood Updated: 01/03/25 0422     Glucose 151 mg/dL     POC Glucose Once [[id number]]  (Abnormal) Collected: 01/03/25 0320    Specimen: Blood Updated: 01/03/25 0322     Glucose 166 mg/dL     Beta Hydroxybutyrate Quantitative [[id number]]  (Abnormal) Collected: 01/02/25 0900    Specimen: Blood Updated: 01/03/25 0211     Beta-Hydroxybutyrate Quant 0.662 mmol/L     Narrative:      In the assessment of possible diabetic ketoacidosis, the test should be interpreted along with other clinical and laboratory findings.  A level greater than 1 mmol/L should require further evaluation and levels of more than 3 mmol/L require immediate medical review.    POC Glucose Once [[id number]]  (Abnormal) Collected: 01/03/25 0119    Specimen: Blood Updated: 01/03/25 0120     Glucose 152 mg/dL     Basic Metabolic Panel [[id number]]  (Abnormal) Collected: 01/03/25 0018    Specimen: Blood Updated: 01/03/25 0047     Glucose 168 mg/dL      BUN 31 mg/dL      Creatinine 1.25 mg/dL      Sodium 143 mmol/L      Potassium 4.4 mmol/L      Chloride  105 mmol/L      CO2 27.0 mmol/L      Calcium 9.0 mg/dL      BUN/Creatinine Ratio 24.8     Anion Gap 11.0 mmol/L      eGFR 56.7 mL/min/1.73     Narrative:      GFR Categories in Chronic Kidney Disease (CKD)      GFR Category          GFR (mL/min/1.73)    Interpretation  G1                     90 or greater         Normal or high (1)  G2                      60-89                Mild decrease (1)  G3a                   45-59                Mild to moderate decrease  G3b                   30-44                Moderate to severe decrease  G4                    15-29                Severe decrease  G5                    14 or less           Kidney failure          (1)In the absence of evidence of kidney disease, neither GFR category G1 or G2 fulfill the criteria for CKD.    eGFR calculation 2021 CKD-EPI creatinine equation, which does not include race as a factor    POC Glucose Once [[id number]]  (Abnormal) Collected: 01/03/25 0011    Specimen: Blood Updated: 01/03/25 0022     Glucose 183 mg/dL     Urea Nitrogen, Urine - Indwelling Urethral Catheter [[id number]] Collected: 01/02/25 1213    Specimen: Urine from Indwelling Urethral Catheter Updated: 01/02/25 2357     Urea Nitrogen, Urine 413 mg/dL     Narrative:      Reference intervals for random urine have not been established.  Clinical usage is dependent upon physician's interpretation in combination with other laboratory tests.       Creatinine Urine Random (kidney function) GFR component - Indwelling Urethral Catheter [[id number]] Collected: 01/02/25 1213    Specimen: Urine from Indwelling Urethral Catheter Updated: 01/02/25 2357     Creatinine, Urine 52.3 mg/dL     Narrative:      Reference intervals for random urine have not been established.  Clinical usage is dependent upon physician's interpretation in combination with other laboratory tests.       POC Glucose Once [[id number]]  (Abnormal) Collected: 01/02/25 2310    Specimen: Blood Updated: 01/02/25 2311      Glucose 190 mg/dL     POC Glucose Once [[id number]]  (Abnormal) Collected: 01/02/25 2218    Specimen: Blood Updated: 01/02/25 2219     Glucose 211 mg/dL     POC Glucose Once [[id number]]  (Abnormal) Collected: 01/02/25 2108    Specimen: Blood Updated: 01/02/25 2109     Glucose 248 mg/dL     Lactic Acid, Plasma [[id number]]  (Abnormal) Collected: 01/02/25 2021    Specimen: Blood Updated: 01/02/25 2100     Lactate 2.8 mmol/L      Comment: Falsely depressed results may occur on samples drawn from patients receiving N-Acetylcysteine (NAC) or Metamizole.       Basic Metabolic Panel [[id number]]  (Abnormal) Collected: 01/02/25 2021    Specimen: Blood Updated: 01/02/25 2053     Glucose 234 mg/dL      BUN 34 mg/dL      Creatinine 1.38 mg/dL      Sodium 134 mmol/L      Potassium 4.7 mmol/L      Chloride 98 mmol/L      CO2 22.0 mmol/L      Calcium 8.7 mg/dL      BUN/Creatinine Ratio 24.6     Anion Gap 14.0 mmol/L      eGFR 50.3 mL/min/1.73     Narrative:      GFR Categories in Chronic Kidney Disease (CKD)      GFR Category          GFR (mL/min/1.73)    Interpretation  G1                     90 or greater         Normal or high (1)  G2                      60-89                Mild decrease (1)  G3a                   45-59                Mild to moderate decrease  G3b                   30-44                Moderate to severe decrease  G4                    15-29                Severe decrease  G5                    14 or less           Kidney failure          (1)In the absence of evidence of kidney disease, neither GFR category G1 or G2 fulfill the criteria for CKD.    eGFR calculation 2021 CKD-EPI creatinine equation, which does not include race as a factor    Heparin Anti-Xa [[id number]]  (Abnormal) Collected: 01/02/25 2021    Specimen: Blood Updated: 01/02/25 2045     Heparin Anti-Xa (UFH) 0.10 IU/ml     Blood Gas, Arterial With Co-Ox [[id number]]  (Abnormal) Collected: 01/02/25 2021    Specimen: Arterial Blood Updated: 01/02/25  2021     Site Arterial Line     Apolinar's Test N/A     pH, Arterial 7.302 pH units      Comment: 84 Value below reference range        pCO2, Arterial 52.6 mm Hg      Comment: 83 Value above reference range        pO2, Arterial 92.5 mm Hg      HCO3, Arterial 26.0 mmol/L      Base Excess, Arterial -0.6 mmol/L      Hemoglobin, Blood Gas 8.2 g/dL      Comment: 84 Value below reference range        Hematocrit, Blood Gas 25.2 %      Oxyhemoglobin 95.8 %      Methemoglobin 0.30 %      Carboxyhemoglobin 1.8 %      CO2 Content 27.6 mmol/L      Temperature 37.0     Barometric Pressure for Blood Gas --     Comment: N/A        Modality Ventilator     FIO2 65 %      Ventilator Mode VC+/AC     Set Tidal Volume 0.39     Rate 22 Breaths/minute      PEEP 14.0     PIP 0 cmH2O      Comment: Meter: [id number]     :  [id number]        IPAP 0     EPAP 0     pH, Temp Corrected 7.302 pH Units      pCO2, Temperature Corrected 52.6 mm Hg      pO2, Temperature Corrected 92.5 mm Hg     POC Glucose Once [[id number]]  (Abnormal) Collected: 01/02/25 2007    Specimen: Blood Updated: 01/02/25 2009     Glucose 255 mg/dL     Legionella Antigen, Urine - Urine, Urine, Clean Catch [[id number]]  (Normal) Collected: 01/02/25 1213    Specimen: Urine, Clean Catch Updated: 01/02/25 1913     LEGIONELLA ANTIGEN, URINE Negative    S. Pneumo Ag Urine or CSF - Urine, Urine, Clean Catch [[id number]]  (Normal) Collected: 01/02/25 1213    Specimen: Urine, Clean Catch Updated: 01/02/25 1913     Strep Pneumo Ag Negative    POC Glucose Once [[id number]]  (Abnormal) Collected: 01/02/25 1904    Specimen: Blood Updated: 01/02/25 1905     Glucose 286 mg/dL     POC Glucose Once [[id number]]  (Abnormal) Collected: 01/02/25 1800    Specimen: Blood Updated: 01/02/25 1802     Glucose 337 mg/dL     Eosinophil Smear - Urine, Indwelling Urethral Catheter [[id number]]  (Normal) Collected: 01/02/25 1542    Specimen: Urine from Indwelling Urethral Catheter Updated: 01/02/25 1700      Eosinophil Smear 0 % EOS/100 Cells     Narrative:      No eosinophil seen    POC Glucose Once [[id number]]  (Abnormal) Collected: 01/02/25 1654    Specimen: Blood Updated: 01/02/25 1656     Glucose 343 mg/dL     Protein, Urine, Random - Indwelling Urethral Catheter [[id number]] Collected: 01/02/25 1213    Specimen: Urine from Indwelling Urethral Catheter Updated: 01/02/25 1647     Total Protein, Urine 59.2 mg/dL     Narrative:      Reference intervals for random urine have not been established.  Clinical usage is dependent upon physician's interpretation in combination with other laboratory tests.       Sodium, Urine, Random - Indwelling Urethral Catheter [[id number]] Collected: 01/02/25 1213    Specimen: Urine from Indwelling Urethral Catheter Updated: 01/02/25 1647     Sodium, Urine 115 mmol/L     Narrative:      Reference intervals for random urine have not been established.  Clinical usage is dependent upon physician's interpretation in combination with other laboratory tests.       Basic Metabolic Panel [[id number]]  (Abnormal) Collected: 01/02/25 1538    Specimen: Blood Updated: 01/02/25 1629     Glucose 373 mg/dL      BUN 37 mg/dL      Creatinine 1.61 mg/dL      Sodium 137 mmol/L      Potassium 5.7 mmol/L      Chloride 100 mmol/L      CO2 22.0 mmol/L      Calcium 8.7 mg/dL      BUN/Creatinine Ratio 23.0     Anion Gap 15.0 mmol/L      eGFR 41.8 mL/min/1.73     Narrative:      GFR Categories in Chronic Kidney Disease (CKD)      GFR Category          GFR (mL/min/1.73)    Interpretation  G1                     90 or greater         Normal or high (1)  G2                      60-89                Mild decrease (1)  G3a                   45-59                Mild to moderate decrease  G3b                   30-44                Moderate to severe decrease  G4                    15-29                Severe decrease  G5                    14 or less           Kidney failure          (1)In the absence of evidence of  kidney disease, neither GFR category G1 or G2 fulfill the criteria for CKD.    eGFR calculation 2021 CKD-EPI creatinine equation, which does not include race as a factor    Heparin Anti-Xa [[id number]]  (Abnormal) Collected: 01/02/25 1538    Specimen: Blood Updated: 01/02/25 1628     Heparin Anti-Xa (UFH) 0.10 IU/ml     Protime-INR [[id number]]  (Abnormal) Collected: 01/02/25 1538    Specimen: Blood Updated: 01/02/25 1628     Protime 14.8 Seconds      INR 1.15    aPTT [[id number]]  (Abnormal) Collected: 01/02/25 1538    Specimen: Blood Updated: 01/02/25 1628     PTT 35.1 seconds     Narrative:      PTT = The equivalent PTT values for the therapeutic range of heparin levels at 0.3 to 0.5 U/ml are 60 to 70 seconds.    Vancomycin, Random [[id number]]  (Normal) Collected: 01/02/25 1314    Specimen: Blood Updated: 01/02/25 1546     Vancomycin Random 13.90 mcg/mL     Narrative:      Therapeutic Ranges for Vancomycin    Vancomycin Random   5.0-40.0 mcg/mL  Vancomycin Trough   5.0-20.0 mcg/mL  Vancomycin Peak     20.0-40.0 mcg/mL    CK [[id number]]  (Abnormal) Collected: 01/02/25 1314    Specimen: Blood Updated: 01/02/25 1544     Creatine Kinase 211 U/L     Lactate Dehydrogenase [[id number]]  (Abnormal) Collected: 01/02/25 1314    Specimen: Blood Updated: 01/02/25 1544      U/L     Uric Acid [[id number]]  (Abnormal) Collected: 01/02/25 1314    Specimen: Blood Updated: 01/02/25 1544     Uric Acid 7.6 mg/dL      Comment: Falsely depressed results may occur on samples drawn from patients receiving N-Acetylcysteine (NAC) or Metamizole.       Phosphorus [[id number]]  (Normal) Collected: 01/02/25 1314    Specimen: Blood Updated: 01/02/25 1544     Phosphorus 4.5 mg/dL     POC Glucose Once [[id number]]  (Abnormal) Collected: 01/02/25 1539    Specimen: Blood Updated: 01/02/25 1541     Glucose 384 mg/dL     POC Glucose Once [[id number]]  (Abnormal) Collected: 01/02/25 1537    Specimen: Blood Updated: 01/02/25 1540     Glucose 406 mg/dL      Blood Gas, Arterial With Co-Ox [[id number]]  (Abnormal) Collected: 01/02/25 1514    Specimen: Arterial Blood Updated: 01/02/25 1514     Site Arterial Line     Apolinar's Test N/A     pH, Arterial 7.221 pH units      Comment: 84 Value below reference range        pCO2, Arterial 59.8 mm Hg      Comment: 83 Value above reference range        pO2, Arterial 98.1 mm Hg      HCO3, Arterial 24.5 mmol/L      Base Excess, Arterial -3.4 mmol/L      Hemoglobin, Blood Gas 8.4 g/dL      Comment: 84 Value below reference range        Hematocrit, Blood Gas 25.6 %      Oxyhemoglobin 95.7 %      Methemoglobin 0.40 %      Carboxyhemoglobin 1.8 %      CO2 Content 26.3 mmol/L      Temperature 37.0     Barometric Pressure for Blood Gas --     Comment: N/A        Modality Ventilator     FIO2 75 %      Ventilator Mode VC+/AC     Set Tidal Volume 0.39     Rate 0 Breaths/minute      PEEP 14.0     PIP 28 cmH2O      Comment: Meter: [id number]     :  [id number]        IPAP 0     EPAP 0     pH, Temp Corrected 7.221 pH Units      pCO2, Temperature Corrected 59.8 mm Hg      pO2, Temperature Corrected 98.1 mm Hg     MRSA Screen, PCR (Inpatient) - Swab, Nares [[id number]]  (Abnormal) Collected: 01/02/25 1246    Specimen: Swab from Nares Updated: 01/02/25 1504     MRSA PCR Positive    Narrative:      The negative predictive value of this diagnostic test is high and should only be used to consider de-escalating anti-MRSA therapy. A positive result may indicate colonization with MRSA and must be correlated clinically.    POC Glucose Once [[id number]]  (Abnormal) Collected: 01/02/25 1417    Specimen: Blood Updated: 01/02/25 1418     Glucose 384 mg/dL     POC Glucose Once [[id number]]  (Abnormal) Collected: 01/02/25 1415    Specimen: Blood Updated: 01/02/25 1418     Glucose 410 mg/dL     Basic Metabolic Panel [[id number]]  (Abnormal) Collected: 01/02/25 1314    Specimen: Blood Updated: 01/02/25 1405     Glucose 377 mg/dL      BUN 39 mg/dL       "Creatinine 1.69 mg/dL      Sodium 136 mmol/L      Potassium 6.1 mmol/L      Chloride 100 mmol/L      CO2 21.0 mmol/L      Calcium 8.8 mg/dL      BUN/Creatinine Ratio 23.1     Anion Gap 15.0 mmol/L      eGFR 39.5 mL/min/1.73     Narrative:      GFR Categories in Chronic Kidney Disease (CKD)      GFR Category          GFR (mL/min/1.73)    Interpretation  G1                     90 or greater         Normal or high (1)  G2                      60-89                Mild decrease (1)  G3a                   45-59                Mild to moderate decrease  G3b                   30-44                Moderate to severe decrease  G4                    15-29                Severe decrease  G5                    14 or less           Kidney failure          (1)In the absence of evidence of kidney disease, neither GFR category G1 or G2 fulfill the criteria for CKD.    eGFR calculation 2021 CKD-EPI creatinine equation, which does not include race as a factor    Procalcitonin [[id number]]  (Abnormal) Collected: 01/02/25 1314    Specimen: Blood Updated: 01/02/25 1402     Procalcitonin 3.51 ng/mL     Narrative:      As a Marker for Sepsis (Non-Neonates):    1. <0.5 ng/mL represents a low risk of severe sepsis and/or septic shock.  2. >2 ng/mL represents a high risk of severe sepsis and/or septic shock.    As a Marker for Lower Respiratory Tract Infections that require antibiotic therapy:    PCT on Admission    Antibiotic Therapy       6-12 Hrs later    >0.5                Strongly Recommended  >0.25 - <0.5        Recommended   0.1 - 0.25          Discouraged              Remeasure/reassess PCT  <0.1                Strongly Discouraged     Remeasure/reassess PCT    As 28 day mortality risk marker: \"Change in Procalcitonin Result\" (>80% or <=80%) if Day 0 (or Day 1) and Day 4 values are available. Refer to http://www.[employer]-pct-calculator.com    Change in PCT <=80%  A decrease of PCT levels below or equal to 80% defines a positive change " in PCT test result representing a higher risk for 28-day all-cause mortality of patients diagnosed with severe sepsis for septic shock.    Change in PCT >80%  A decrease of PCT levels of more than 80% defines a negative change in PCT result representing a lower risk for 28-day all-cause mortality of patients diagnosed with severe sepsis or septic shock.       Magnesium [[id number]]  (Normal) Collected: 01/02/25 1314    Specimen: Blood Updated: 01/02/25 1401     Magnesium 1.7 mg/dL     Blood Gas, Arterial With Co-Ox [[id number]]  (Abnormal) Collected: 01/02/25 1326    Specimen: Arterial Blood Updated: 01/02/25 1349     Site Arterial Line     Comment: Corrected result. Previous result was Right Radial on 1/2/2025 at 1326 EST.        Apolinar's Test N/A     pH, Arterial 7.192 pH units      Comment: 85 Value below critical limit        pCO2, Arterial 62.4 mm Hg      Comment: 83 Value above reference range        pO2, Arterial 190.0 mm Hg      Comment: 83 Value above reference range        HCO3, Arterial 23.9 mmol/L      Base Excess, Arterial -4.4 mmol/L      Hemoglobin, Blood Gas 8.6 g/dL      Comment: 84 Value below reference range        Hematocrit, Blood Gas 26.3 %      Oxyhemoglobin 98.2 %      Methemoglobin 0.40 %      Carboxyhemoglobin 1.7 %      CO2 Content 25.8 mmol/L      Temperature 37.0     Barometric Pressure for Blood Gas --     Comment: N/A        Modality Ventilator     Comment: Corrected result. Previous result was Room Air on 1/2/2025 at 1326 EST.        FIO2 100 %      Comment: Corrected result. Previous result was 21 % on 1/2/2025 at 1326 EST.        Rate 0 Breaths/minute      PIP 0 cmH2O      Comment: Meter: [id number]     :  [id number]        IPAP 0     EPAP 0     Notified Magalie RONDON MD     Notified By [id number]     Notified Time 01/02/2025 13:24     pH, Temp Corrected 7.192 pH Units      pCO2, Temperature Corrected 62.4 mm Hg      pO2, Temperature Corrected 190 mm Hg     CBC & Differential  [[id number]]  (Abnormal) Collected: 01/02/25 1314    Specimen: Blood Updated: 01/02/25 1324    Narrative:      The following orders were created for panel order CBC & Differential.  Procedure                               Abnormality         Status                     ---------                               -----------         ------                     CBC Auto Differential[[id number]]        Abnormal            Final result                 Please view results for these tests on the individual orders.    CBC Auto Differential [[id number]]  (Abnormal) Collected: 01/02/25 1314    Specimen: Blood Updated: 01/02/25 1324     WBC 9.71 10*3/mm3      RBC 2.93 10*6/mm3      Hemoglobin 8.5 g/dL      Hematocrit 28.0 %      MCV 95.6 fL      MCH 29.0 pg      MCHC 30.4 g/dL      RDW 18.0 %      RDW-SD 61.8 fl      MPV 9.2 fL      Platelets 222 10*3/mm3      Neutrophil % 83.5 %      Lymphocyte % 6.8 %      Monocyte % 8.5 %      Eosinophil % 0.1 %      Basophil % 0.3 %      Immature Grans % 0.8 %      Neutrophils, Absolute 8.10 10*3/mm3      Lymphocytes, Absolute 0.66 10*3/mm3      Monocytes, Absolute 0.83 10*3/mm3      Eosinophils, Absolute 0.01 10*3/mm3      Basophils, Absolute 0.03 10*3/mm3      Immature Grans, Absolute 0.08 10*3/mm3      nRBC 0.2 /100 WBC     POC Glucose Once [[id number]]  (Abnormal) Collected: 01/02/25 1302    Specimen: Blood Updated: 01/02/25 1304     Glucose 387 mg/dL     Urinalysis, Microscopic Only - Indwelling Urethral Catheter [[id number]]  (Abnormal) Collected: 01/02/25 1213    Specimen: Urine from Indwelling Urethral Catheter Updated: 01/02/25 1304     RBC, UA 3-5 /HPF      WBC, UA 11-20 /HPF      Bacteria, UA 1+ /HPF      Squamous Epithelial Cells, UA 7-12 /HPF      Hyaline Casts, UA 0-6 /LPF      Coarse Granular Casts, UA 7-12 /LPF      Methodology Manual Light Microscopy    Urinalysis With Culture If Indicated - Indwelling Urethral Catheter [[id number]]  (Abnormal) Collected: 01/02/25 1213    Specimen:  Urine from Indwelling Urethral Catheter Updated: 01/02/25 1241     Color, UA Yellow     Appearance, UA Cloudy     pH, UA <=5.0     Specific Gravity, UA 1.019     Glucose,  mg/dL (1+)     Ketones, UA Negative     Bilirubin, UA Negative     Blood, UA Moderate (2+)     Protein,  mg/dL (2+)     Leuk Esterase, UA Negative     Nitrite, UA Negative     Urobilinogen, UA 0.2 E.U./dL    Narrative:      In absence of clinical symptoms, the presence of pyuria, bacteria, and/or nitrites on the urinalysis result does not correlate with infection.    POC Glucose Once [[id number]]  (Abnormal) Collected: 01/02/25 1150    Specimen: Blood Updated: 01/02/25 1155     Glucose 342 mg/dL           Imaging Results (Last 24 Hours)       Procedure Component Value Units Date/Time    XR Abdomen KUB [[id number]] Resulted: 01/03/25 1119     Updated: 01/03/25 1119    XR Chest 1 View [[id number]] Collected: 01/03/25 0816     Updated: 01/03/25 0821    Narrative:      XR CHEST 1 VW    Date of Exam: 1/3/2025 6:07 AM EST    Indication: Vent    Comparison: 1/2/2025    Findings:  ET tube NG tube and right IJ catheter appear to be in satisfactory position. There is re-- aeration of the previously atelectatic left upper lung. Dense opacity remains in the lower one half of the left chest. Right lung is mostly clear, with minimal   atelectasis in the medial right lung base. Heart is enlarged. Pulmonary vasculature is upper limits of normal. No pneumothorax is identified.      Impression:      Impression:  Significantly improved aeration of the left upper lung. Considerable remaining left basilar atelectasis. No new chest pathology is seen.      Electronically Signed: Jermaine Mccoy MD    1/3/2025 8:18 AM EST    Workstation ID: [id number]    XR Abdomen KUB [[id number]] Collected: 01/02/25 1222     Updated: 01/02/25 1226    Narrative:      XR ABDOMEN KUB    Date of Exam: 1/2/2025 12:08 PM EST    Indication: OG    Comparison: Same day CT abdomen  pelvis.    Findings:  Patient is rotated. Nasogastric tube tip and sidehole overlie the stomach. Visualized bowel gas pattern is nonobstructive. Scattered surgical clips.      Impression:      Impression:  Nasogastric tube tip and sidehole overlie the stomach.      Electronically Signed: Bereket Rey MD    1/2/2025 12:23 PM EST    Workstation ID: [id number]    XR Chest 1 View [[id number]] Collected: 01/02/25 1215     Updated: 01/02/25 1225    Narrative:      XR CHEST 1 VW    Date of Exam: 1/2/2025 11:50 AM EST    Indication: Confirm ET Tube Placement    Comparison: Chest CT same date. Chest radiograph 1/1/2025.    Findings:  Patient is rotated. Endotracheal tube tip is 3.8 cm above the anna. Nasogastric tube courses over the stomach with the tip excluded from the field-of-view. Right IJ approach central venous catheter terminates near the brachiocephalic confluence.   Cardiomediastinal silhouette is obscured. Airspace disease and volume loss throughout the left lung, which appears worsened compared to same day CT. Scattered airspace opacities in the right lung, which appears overall unchanged. No sizable pleural   effusion. No distinct pneumothorax. Osseous structures are unchanged.      Impression:      Impression:  Endotracheal tube is 3.8 cm above the anna. Volume loss and airspace disease throughout the left lung appears worsened compared to same day CT, though findings in part could be related to patient rotation and modality differences. No additional   evidence of significant interval change.      Electronically Signed: Bereket Rey MD    1/2/2025 12:21 PM EST    Workstation ID: [id number]          Orders (last 24 hrs)        Start     Ordered    01/05/25 0600  Vancomycin, Trough  Morning Draw         01/03/25 1114    01/04/25 0600  XR Chest 1 View  1 Time Imaging,   Status:  Canceled         01/03/25 0356    01/03/25 1600  cefepime 2000 mg IVPB in 100 mL NS (MBP)  Every 8 Hours         01/03/25 1114     01/03/25 1200  Heparin Anti-Xa  Timed         01/03/25 0505    01/03/25 1116  XR Abdomen KUB  1 Time Imaging         01/03/25 1115    01/03/25 1040  POC Glucose Once  PROCEDURE ONCE        Comments: Complete no more than 45 minutes prior to patient eating      01/03/25 1038    01/03/25 0953  Feeding Tube Insertion  Once         01/03/25 0952    01/03/25 0946  POC Glucose Once  PROCEDURE ONCE        Comments: Complete no more than 45 minutes prior to patient eating      01/03/25 0943    01/03/25 0900  vancomycin (dosing per levels)  Daily,   Status:  Discontinued         01/02/25 1548    01/03/25 0900  Vancomycin HCl 1,250 mg in sodium chloride 0.9 % 250 mL VTB  Every 12 Hours         01/03/25 0738    01/03/25 0837  POC Glucose Once  PROCEDURE ONCE        Comments: Complete no more than 45 minutes prior to patient eating      01/03/25 0835    01/03/25 0728  POC Glucose Once  PROCEDURE ONCE        Comments: Complete no more than 45 minutes prior to patient eating      01/03/25 0725    01/03/25 0619  POC Glucose Once  PROCEDURE ONCE        Comments: Complete no more than 45 minutes prior to patient eating      01/03/25 0616    01/03/25 0600  Vancomycin, Random  Morning Draw         01/02/25 1511    01/03/25 0600  CBC & Differential  Every Third Day      Comments: Discontinue After Heparin Stopped      01/02/25 1513    01/03/25 0600  ECG 12 Lead Drug Monitoring; Amiodarone  Daily       01/02/25 1704    01/03/25 0600  Comprehensive Metabolic Panel  Daily       01/02/25 2015 01/03/25 0600  CBC (No Diff)  Daily       01/02/25 2015 01/03/25 0600  Magnesium  Morning Draw         01/02/25 2015 01/03/25 0600  Phosphorus  Morning Draw         01/02/25 2015 01/03/25 0600  CBC Auto Differential  PROCEDURE ONCE        Comments: Discontinue After Heparin Stopped      01/02/25 2202    01/03/25 0600  Blood Gas, Arterial -With Co-Ox Panel: Yes  Morning Draw         01/03/25 0356    01/03/25 0600  heparin (porcine)  "injection 4,000 Units  Once         01/03/25 0505    01/03/25 0510  POC Glucose Once  PROCEDURE ONCE        Comments: Complete no more than 45 minutes prior to patient eating      01/03/25 0508    01/03/25 0506  XR Chest 1 View  1 Time Imaging         01/03/25 0505    01/03/25 0426  Blood Gas, Arterial With Co-Ox  PROCEDURE ONCE         01/03/25 0425    01/03/25 0423  POC Glucose Once  PROCEDURE ONCE        Comments: Complete no more than 45 minutes prior to patient eating      01/03/25 0421    01/03/25 0400  Heparin Anti-Xa  Timed         01/02/25 2149    01/03/25 0323  POC Glucose Once  PROCEDURE ONCE        Comments: Complete no more than 45 minutes prior to patient eating      01/03/25 0320    01/03/25 0121  POC Glucose Once  PROCEDURE ONCE        Comments: Complete no more than 45 minutes prior to patient eating      01/03/25 0119    01/03/25 0023  POC Glucose Once  PROCEDURE ONCE        Comments: Complete no more than 45 minutes prior to patient eating      01/03/25 0011    01/03/25 0007  Restraints Non-Violent or Non-Self Destructive  Calendar Day         01/03/25 0006    01/03/25 0000  aPTT  Timed,   Status:  Canceled         01/02/25 1557    01/02/25 2327  amiodarone 360 mg in 200 mL D5W infusion  Continuous        Placed in \"Followed by\" Linked Group    01/02/25 1704    01/02/25 2312  POC Glucose Once  PROCEDURE ONCE        Comments: Complete no more than 45 minutes prior to patient eating      01/02/25 2310    01/02/25 2245  heparin (porcine) injection 4,000 Units  Once         01/02/25 2149 01/02/25 2220  POC Glucose Once  PROCEDURE ONCE        Comments: Complete no more than 45 minutes prior to patient eating      01/02/25 2218    01/02/25 2215  sodium chloride 0.9 % infusion  Continuous         01/02/25 2118 01/02/25 2117  Apply Cooling [place]au  Once         01/02/25 2116 01/02/25 2114  acetaminophen (TYLENOL) tablet 650 mg  Every 8 Hours PRN         01/02/25 2114    01/02/25 2110  POC Glucose " "Once  PROCEDURE ONCE        Comments: Complete no more than 45 minutes prior to patient eating      01/02/25 2108 01/02/25 2100  cefepime 2000 mg IVPB in 100 mL NS (MBP)  Every 12 Hours,   Status:  Discontinued         01/02/25 1231    01/02/25 2100  sodium zirconium cyclosilicate (LOKELMA) packet 10 g  3 Times Daily,   Status:  Discontinued         01/02/25 1413    01/02/25 2100  sodium zirconium cyclosilicate (LOKELMA) packet 10 g  3 Times Daily,   Status:  Discontinued         01/02/25 1430    01/02/25 2100  albumin human 25 % IV SOLN 25 g  2 Times Daily         01/02/25 1631    01/02/25 2100  Heparin Anti-Xa  Timed         01/02/25 1948    01/02/25 2100  metroNIDAZOLE (FLAGYL) IVPB 500 mg  Every 8 Hours         01/02/25 1952 01/02/25 2022  Blood Gas, Arterial With Co-Ox  PROCEDURE ONCE         01/02/25 2021 01/02/25 2010  POC Glucose Once  PROCEDURE ONCE        Comments: Complete no more than 45 minutes prior to patient eating      01/02/25 2007 01/02/25 2000  Blood Gas, Arterial -With Co-Ox Panel: Yes  Once,   Status:  Canceled         01/02/25 1928 01/02/25 1956  Lactic Acid, Plasma  STAT         01/02/25 1955 01/02/25 1952  Code Status and Medical Interventions: CPR (Attempt to Resuscitate); Full Support  Continuous         01/02/25 1952 01/02/25 1940  Blood Gas, Arterial -With Co-Ox Panel: Yes  STAT         01/02/25 1939 01/02/25 1906  POC Glucose Once  PROCEDURE ONCE        Comments: Complete no more than 45 minutes prior to patient eating      01/02/25 1904 01/02/25 1803  POC Glucose Once  PROCEDURE ONCE        Comments: Complete no more than 45 minutes prior to patient eating      01/02/25 1800    01/02/25 1800  magnesium sulfate 2g/50 mL (PREMIX) infusion  Once         01/02/25 1703    01/02/25 1800  amiodarone 150 mg in 100 mL D5W (loading dose)  Once        Placed in \"Followed by\" Linked Group    01/02/25 1704    01/02/25 1800  amiodarone 360 mg in 200 mL D5W infusion  " "Continuous        Placed in \"Followed by\" Linked Group    01/02/25 1704    01/02/25 1730  bumetanide (BUMEX) injection 0.5 mg  Once         01/02/25 1631    01/02/25 1704  Initiate & Follow Amiodarone Protocol  Continuous         01/02/25 1704    01/02/25 1704  Notify Provider - QTc 500 milliseconds or Greater  Continuous        Comments: Open Order Report to View Parameters Requiring Provider Notification    01/02/25 1704    01/02/25 1704  ECG 12 Lead Drug Monitoring; Amiodarone  STAT         01/02/25 1704    01/02/25 1704  Inpatient Afib Coordinator Consult  Once        Provider:  (Not yet assigned)    01/02/25 1704    01/02/25 1704  Monitor QTc  Every Shift       01/02/25 1704    01/02/25 1657  ECG 12 Lead Rhythm Change  STAT         01/02/25 1656    01/02/25 1656  POC Glucose Once  PROCEDURE ONCE        Comments: Complete no more than 45 minutes prior to patient eating      01/02/25 1654    01/02/25 1645  vancomycin (VANCOCIN) 1,000 mg in sodium chloride 0.9 % 250 mL IVPB-VTB  Once         01/02/25 1547    01/02/25 1600  Basic Metabolic Panel  Every 4 Hours       01/02/25 1453    01/02/25 1600  heparin 60158 units/250 mL (100 units/mL) in 0.45 % NaCl infusion  Titrated         01/02/25 1513    01/02/25 1542  POC Glucose Once  PROCEDURE ONCE        Comments: Complete no more than 45 minutes prior to patient eating      01/02/25 1539    01/02/25 1541  POC Glucose Once  PROCEDURE ONCE        Comments: Complete no more than 45 minutes prior to patient eating      01/02/25 1537    01/02/25 1524  Sodium, Urine, Random - Indwelling Urethral Catheter  Once         01/02/25 1523    01/02/25 1524  Creatinine Urine Random (kidney function) GFR component - Indwelling Urethral Catheter  Once         01/02/25 1523    01/02/25 1524  Protein, Urine, Random - Indwelling Urethral Catheter  Once         01/02/25 1523    01/02/25 1524  Eosinophil Smear - Urine, Indwelling Urethral Catheter  Once         01/02/25 1523    01/02/25 " 1524  CK  Once         01/02/25 1523    01/02/25 1524  Lactate Dehydrogenase  Once         01/02/25 1523    01/02/25 1524  Uric Acid  Once         01/02/25 1523    01/02/25 1524  Phosphorus  Once         01/02/25 1523    01/02/25 1524  Urea Nitrogen, Urine - Indwelling Urethral Catheter  Once         01/02/25 1523    01/02/25 1515  Blood Gas, Arterial With Co-Ox  PROCEDURE ONCE         01/02/25 1514    01/02/25 1513  Initiate & Follow Heparin Protocol  Continuous         01/02/25 1513    01/02/25 1513  Notify Provider Platelet Count Less Than 96143  Continuous        Comments: Open Order Report to View Parameters Requiring Provider Notification    01/02/25 1513    01/02/25 1513  Stop Infusion & Notify Provider if Bleeding Occurs  Continuous        Comments: Open Order Report to View Parameters Requiring Provider Notification    01/02/25 1513    01/02/25 1513  Heparin Anti-Xa  STAT        Comments: Prior to Initial Heparin Bolus      01/02/25 1513    01/02/25 1513  Protime-INR  STAT        Comments: Prior to Initial Heparin Bolus      01/02/25 1513    01/02/25 1513  aPTT  STAT        Comments: Prior to Initial Heparin Bolus      01/02/25 1513    01/02/25 1512  Pharmacy to Dose Heparin  Continuous PRN         01/02/25 1513    01/02/25 1512  Vancomycin, Random  Once         01/02/25 1511    01/02/25 1509  Pharmacy to dose vancomycin  Continuous PRN         01/02/25 1511    01/02/25 1500  Blood Gas, Arterial -With Co-Ox Panel: Yes  Once         01/02/25 1345    01/02/25 1500  sodium bicarbonate injection 8.4% 50 mEq  Once         01/02/25 1413    01/02/25 1500  sodium zirconium cyclosilicate (LOKELMA) packet 10 g  Once         01/02/25 1413    01/02/25 1454  Inpatient Admission  Once         01/02/25 1454    01/02/25 1419  POC Glucose Once  PROCEDURE ONCE        Comments: Complete no more than 45 minutes prior to patient eating      01/02/25 1415    01/02/25 1419  POC Glucose Once  PROCEDURE ONCE        Comments:  Complete no more than 45 minutes prior to patient eating      01/02/25 1417    01/02/25 1400  Incentive Spirometry  Every 4 Hours While Awake       01/02/25 1228    01/02/25 1330  cefepime 2000 mg IVPB in 100 mL NS (MBP)  Once         01/02/25 1235    01/02/25 1327  Blood Gas, Arterial With Co-Ox  PROCEDURE ONCE         01/02/25 1326    01/02/25 1321  Legionella Antigen, Urine - Urine, Urine, Clean Catch  Once         01/02/25 1321    01/02/25 1318  Legionella Antigen, Urine - Urine, Urine, Clean Catch  Once,   Status:  Canceled         01/02/25 1319    01/02/25 1318  S. Pneumo Ag Urine or CSF - Urine, Urine, Clean Catch  Once         01/02/25 1319    01/02/25 1315  cefTRIAXone (ROCEPHIN) 2,000 mg in sodium chloride 0.9 % 100 mL MBP  Every 24 Hours,   Status:  Discontinued         01/02/25 1228    01/02/25 1310  Insert Arterial Line  Once        Comments: This order was created via procedure documentation    01/02/25 1309    01/02/25 1305  Urine Culture - Urine, Indwelling Urethral Catheter  Once         01/02/25 1304    01/02/25 1305  POC Glucose Once  PROCEDURE ONCE        Comments: Complete no more than 45 minutes prior to patient eating      01/02/25 1302    01/02/25 1245  chlorhexidine (PERIDEX) 0.12 % solution 15 mL  Every 12 Hours Scheduled         01/02/25 1145    01/02/25 1245  famotidine (PEPCID) injection 20 mg  2 Times Daily         01/02/25 1145    01/02/25 1245  norepinephrine (LEVOPHED) 8 mg in 250 mL NS infusion (premix)  Titrated         01/02/25 1146    01/02/25 1245  midazolam (VERSED) 100 mg in 100mL NS infusion  Titrated         01/02/25 1147    01/02/25 1245  fentaNYL 2500 mcg/250 mL NS infusion  Titrated         01/02/25 1147    01/02/25 1245  insulin regular 1 unit/mL in 0.9% sodium chloride (Glucommander)  Titrated        Note to Pharmacy: Upon initiation of Glucommander insulin drip, make sure all other insulin orders and anti-diabetic medications have been discontinued.    01/02/25 1149     "01/02/25 1242  Legionella Antigen, Urine - Urine, Indwelling Urethral Catheter  Once,   Status:  Canceled         01/02/25 1241    01/02/25 1242  S. Pneumo Ag Urine or CSF - Urine, Indwelling Urethral Catheter  Once,   Status:  Canceled         01/02/25 1241    01/02/25 1239  Urinalysis, Microscopic Only - Indwelling Urethral Catheter  Once         01/02/25 1238    01/02/25 1230  sodium chloride 0.9 % flush 10 mL  Every 12 Hours Scheduled         01/02/25 1143    01/02/25 1230  mupirocin (BACTROBAN) 2 % nasal ointment 1 Application  2 Times Daily         01/02/25 1143    01/02/25 1230  sennosides-docusate (PERICOLACE) 8.6-50 MG per tablet 2 tablet  2 Times Daily        Placed in \"And\" Linked Group    01/02/25 1143    01/02/25 1228  ipratropium (ATROVENT) nebulizer solution 0.5 mg  Every 4 Hours PRN         01/02/25 1228    01/02/25 1228  ipratropium-albuterol (DUO-NEB) nebulizer solution 3 mL  Every 4 Hours PRN         01/02/25 1228    01/02/25 1227  Pneumonia Education  Once        Comments: Pneumonia Education    01/02/25 1228    01/02/25 1227  Notify Provider if Patient Requires Greater Than 4 LPM of Oxygen  Continuous        Comments: Open Order Report to View Parameters Requiring Provider Notification    01/02/25 1228    01/02/25 1227  Nursing Dysphagia Screening (Complete Prior to Giving Anything By Mouth)  Once        Comments: Use Dysphagia Screen for Pneumonia    01/02/25 1228    01/02/25 1227  RN to Place Order SLP Consult - Eval & Treat Choosing Reason of RN Dysphagia Screen Failed  Per Order Details        Comments: RN to Place Order SLP Consult - Eval & Treat Choosing Reason of RN Dysphagia Screen Failed    01/02/25 1228    01/02/25 1227  Nurse to Call MD or Nutrition Services for Diet if Patient Passes Dysphagia Screen  Once         01/02/25 1228    01/02/25 1226  MRSA Screen, PCR (Inpatient) - Swab, Nares  Once         01/02/25 1225    01/02/25 1225  ECG 12 Lead QT Measurement  STAT         01/02/25 " 1224    01/02/25 1222  Procalcitonin  Once         01/02/25 1222    01/02/25 1214  Restraints Non-Violent or Non-Self Destructive  Calendar Day,   Status:  Canceled         01/02/25 1214    01/02/25 1200  Vital Signs Every Hour and Per Hospital Policy Based on Patient Condition  Every Hour       01/02/25 1143    01/02/25 1200  Intake & Output  Every Hour       01/02/25 1143    01/02/25 1200  Oral Care & Teeth Brushing - Intubated Patient  Every 4 Hours      Comments: [place] Teeth at Least 2x/day    01/02/25 1145    01/02/25 1156  POC Glucose Once  PROCEDURE ONCE        Comments: Complete no more than 45 minutes prior to patient eating      01/02/25 1150    01/02/25 1152  Basic Metabolic Panel  STAT         01/02/25 1151    01/02/25 1152  Magnesium  STAT         01/02/25 1151    01/02/25 1152  CBC & Differential  STAT         01/02/25 1151    01/02/25 1152  CBC Auto Differential  PROCEDURE ONCE         01/02/25 1151    01/02/25 1152  Obtain Informed Consent  Once         01/02/25 1151    01/02/25 1151  Legionella Antigen, Urine - Urine, Urine, Clean Catch  Once,   Status:  Canceled         01/02/25 1150    01/02/25 1151  S. Pneumo Ag Urine or CSF - Urine, Urine, Clean Catch  Once,   Status:  Canceled         01/02/25 1150    01/02/25 1149  Prior to Initiating Glucommander™, Ensure All Prior Insulin Orders Are Discontinued  Once         01/02/25 1149    01/02/25 1149  Do Not Start Insulin Infusion if Potassium < 3.3  Per Order Details         01/02/25 1149    01/02/25 1149  Use a Dedicated Line for Insulin Infusion (If Possible).  May Use a Carrier Fluid of NS at KVO Rate if Insulin Rate is Insufficient to Maintain IV Patency.  Prime IV Line With Insulin Infusion  Continuous         01/02/25 1149    01/02/25 1149  Glucommander Must Be Discontinued if Insulin Infusion is Discontinued.  If Insulin Infusion is Restarted, Previous Glucommander Settings Must Be Discontinued and Re-Entered From New Order  Per Order Details          25 1149    25 1149  Patient is on Glucommander  Continuous         25 1149    25 1149  Utilize the Start Meal Feature / Meal Bolus Feature in Glucommander if Patient Starts a Diet or Bolus Tube Feedings  Until Discontinued         25 1149    25 1149  Notify Provider - Insulin Infusion  Continuous        Comments: Open Order Report to View Parameters Requiring Provider Notification    25 1149    25 1149  RN to Release PRN POC Glucose Orders Per Glucommander  Continuous        Comments: Glucommander Recommended POC Glucose Testing Will Vary Between Every 15 Minutes & Every 2 Hours   Release PRN POC Glucose Orders as Needed    25 1149    25 1149  RN to Order STAT Glucose For Any POC Glucose <10 or >600  Continuous        Comments: Do Not Delay Treatment of Unstable Patient to Obtain Glucose Sample  Inform Provider of Results    25 1149    25 1149  Urinalysis With Culture If Indicated - Indwelling Urethral Catheter  Once         25 1150    25 1148  dextrose (GLUTOSE) oral gel 15 g  Every 15 Minutes PRN         25 1149    25 1148  dextrose (D50W) (25 g/50 mL) IV injection 10-50 mL  Every 15 Minutes PRN         25 1149    25 1148  glucagon (GLUCAGEN) injection 1 mg  Every 15 Minutes PRN         25 1149    25 1148  Beta Hydroxybutyrate Quantitative  Once         25 1147    25 1148  Respiratory Culture - Sputum, ET Suction  Once         25 1150    25 1147  fentaNYL (SUBLIMAZE) bolus from bag 10 mcg/mL injection 50 mcg  Every 30 Minutes PRN         25 1147    25 1147  Ventilator - Vent Mode: AC/VC+; Rate: Other; Rate: 16; FiO2: Titrate Per SpO2; Titrate Oxygen for SpO2: 92% or Greater; PEEP: Other; PEEP: 14; Tidal Volume: mL/kg PBW; mL/k  Continuous         25 1146    25 1146  XR Abdomen KUB  1 Time Imaging         25 1146    25 1145   Ventilator - Vent Mode: AC/VC+; Rate: Other; Rate: 16; FiO2: Titrate Per SpO2; Titrate Oxygen for SpO2: 92% or Greater; PEEP: 5; Tidal Volume: mL/kg PBW; mL/k  Continuous,   Status:  Canceled         25 1145    25 1145  Elevate HOB  Continuous         25 1145    25 1145  Subglottic Suctioning Must Be Done Every 6 Hours  Per Order Details        Comments: At Least Every 6 Hours    25 1145    25 1145  Target Arousal Level RASS 0 to -2  Continuous         25 1145    25 1145  NPO Diet NPO Type: Strict NPO  Diet Effective Now         25 1145    25 1145  Blood Gas, Arterial -Obtain on: Current Settings; With Co-Ox Panel: Yes  Once        Comments: One Hour After Intubation      25 1145    25 1145  RN May Place Order For ABG As Needed for Respiratory Distress  Continuous         25 1145    25 1145  XR Chest 1 View  1 Time Imaging         25 1145    25 1144  Daily Weights  Daily       25 1143    25 1144  Daily CHG Bath While in Critical Care  Daily      Comments: Use for admission bath and length of critical care stay.    25 1143    25 1144  NPO Diet NPO Type: Strict NPO  Diet Effective Now,   Status:  Canceled         25 1143    25 1143  Inpatient Nephrology Consult  Once        Specialty:  Nephrology  Provider:  Janessa Esparza MD    25 1142    25 1143  Continuous Cardiac Monitoring  Continuous        Comments: Follow Standing Orders As Outlined in Process Instructions (Open Order Report to View Full Instructions)    25 1143    25 1143  Maintain IV Access  Continuous,   Status:  Canceled         25 1143    25 1143  Telemetry - Place Orders & Notify Provider of Results When Patient Experiences Acute Chest Pain, Dysrhythmia or Respiratory Distress  Continuous        Comments: Open Order Report to View Parameters Requiring Provider Notification     "01/02/25 1143    01/02/25 1143  Continuous Pulse Oximetry  Continuous         01/02/25 1143    01/02/25 1143  Height & Weight  Once         01/02/25 1143    01/02/25 1143  Oral Care - Patient Not on NPPV & Not Intubated  Every Shift       01/02/25 1143    01/02/25 1143  Target Arousal Level RASS 0 to -2  Continuous,   Status:  Canceled         01/02/25 1143    01/02/25 1143  Use Mobility Guidelines for Advancement of Activity  Continuous         01/02/25 1143    01/02/25 1143  Insert Peripheral IV  Once         01/02/25 1143    01/02/25 1143  Saline Lock & Maintain IV Access  Continuous         01/02/25 1143    01/02/25 1143  Place Sequential Compression Device  Once         01/02/25 1143    01/02/25 1143  Maintain Sequential Compression Device  Continuous         01/02/25 1143    01/02/25 1142  bisacodyl (DULCOLAX) suppository 10 mg  Daily PRN        Placed in \"And\" Linked Group    01/02/25 1143    01/02/25 1142  sodium chloride 0.9 % flush 10 mL  As Needed         01/02/25 1143    01/02/25 1142  sodium chloride 0.9 % infusion 40 mL  As Needed         01/02/25 1143    01/02/25 1142  polyethylene glycol (MIRALAX) packet 17 g  Daily PRN        Placed in \"And\" Linked Group    01/02/25 1143    01/02/25 1142  bisacodyl (DULCOLAX) EC tablet 5 mg  Daily PRN,   Status:  Discontinued        Placed in \"And\" Linked Group    01/02/25 1143    Unscheduled  Spontaneous Awakening Trial  Daily - SAT       01/02/25 1145    Unscheduled  Spontaneous Breathing Trial  Daily - SBT       01/02/25 1145    Unscheduled  Treat Hypoglycemia As Recommended By Glucommander™ & Notify Provider of Treatment  As Needed      Comments: Follow Hypoglycemia Orders As Outlined in Process Instructions (Open Order Report to View Full Instructions)  Notify Provider Any Time Hypoglycemia Treatment is Administered    01/02/25 1149    Unscheduled  If Insulin Infusion is Paused - Follow Glucommander Instructions  As Needed       01/02/25 1149    Unscheduled  " POC Glucose PRN  As Needed      Comments: Glucommander Recommended POC Glucose Testing Will Vary Between Every 15 Minutes & Every 2 Hours Release PRN POC Glucose Orders as Needed      01/02/25 1149                  Physician Progress Notes (last 24 hours)  Notes from 01/02/25 1124 through 01/03/25 1124   No notes of this type exist for this encounter.          Consult Notes (last 24 hours)        Janessa Esparza MD at 01/02/25 1446        Consult Orders    1. Inpatient Nephrology Consult [[id number]] ordered by Radha Crump APRN at 01/02/25 1142                   Referring Provider: radha Crump  Reason for Consultation: MARIAA     Subjective     Chief complaint AMS     History of present illness: This is 39 year old female with past medical hx of DM type II, hx of DKA in past, gout, Factor V leiden deficiency, HTN, HLD, Non functioning left kidney s/p nephrectomy 2022 who presented to OSH for AMS . She was found by Family member unresponsive and EMS was called. At ED she was found to have Potassium of 8.2, Scr 2.85 BUN 40, glucose 481. ABG showing pH of 7.0. She was given insulin to correct hyperglycemia and hyperkalemia. Her baseline Scr has been around 1.0. She was hypotensive so started on Levophed and insulin drip was also started. She was subsequently intubated and placed on ventilator for respiratory distress. Urine drug screen was positive for opioids and narcan was administered. Transferred to our facility for higher level of care. Nephrology service has been consulted for MARIAA management     History  Past Medical History:   Diagnosis Date    A-fib     Abnormal ECG     Anemia     Anxiety     Asthma     Cancer     Ovarian    Depression     Diabetes mellitus     DVT (deep venous thrombosis)     Factor 5 Leiden mutation, heterozygous     Fibroid     GERD (gastroesophageal reflux disease)     Gout     H/O abdominal abscess     History of sepsis     History of transfusion     Hyperlipidemia      Hypothyroid     Kidney stone     Migraines     Neuropathy     Ovarian cancer 2021    Ovarian cyst     PE (pulmonary embolism)     Polycystic ovary syndrome     Preeclampsia     Rh incompatibility     Stroke     TIA (transient ischemic attack)     Urinary tract infection     Varicella    ,   Past Surgical History:   Procedure Laterality Date    ABDOMINAL SURGERY      CARDIAC CATHETERIZATION       SECTION      CHOLECYSTECTOMY      COLONOSCOPY      ENDOSCOPY      EXTRACORPOREAL SHOCK WAVE LITHOTRIPSY (ESWL) Left 10/22/2021    Procedure: EXTRACORPOREAL SHOCKWAVE LITHOTRIPSY;  Surgeon: Milan Motley MD;  Location: [facility];  Service: Urology;  Laterality: Left;    HYSTERECTOMY  2017    ovarian ca    LITHOTRIPSY      NEPHRECTOMY Left 10/2022    RIGHT OOPHORECTOMY      URETEROSCOPY LASER LITHOTRIPSY WITH STENT INSERTION Left 10/01/2021    Procedure: URETEROSCOPY WITH STENT PLACEMENT;  Surgeon: Milan Motley MD;  Location: [facility];  Service: Urology;  Laterality: Left;    URETEROSCOPY LASER LITHOTRIPSY WITH STENT INSERTION Left 2022    Procedure: URETEROSCOPY STENT REMOVAL;  Surgeon: Milan Motley MD;  Location: [facility];  Service: Urology;  Laterality: Left;    URETEROSCOPY LASER LITHOTRIPSY WITH STENT INSERTION Left 2022    Procedure: CYSTOSCOPY RETROGRADE LEFT WITH STENT PLACEMENT;  Surgeon: Milan Motley MD;  Location: [facility];  Service: Urology;  Laterality: Left;   ,   Family History   Problem Relation Age of Onset    Hypertension Mother     Diabetes Father     Hypertension Father     Heart attack Father     No Known Problems Sister     No Known Problems Brother     No Known Problems Daughter     No Known Problems Son     No Known Problems Maternal Grandmother     No Known Problems Paternal Grandmother     Breast cancer Paternal Aunt     No Known Problems Maternal Grandfather     No Known Problems Paternal Grandfather     No Known Problems Cousin   "   Rheum arthritis Neg Hx     Osteoarthritis Neg Hx     Asthma Neg Hx     Heart failure Neg Hx     Hyperlipidemia Neg Hx     Migraines Neg Hx     Rashes / Skin problems Neg Hx     Seizures Neg Hx     Stroke Neg Hx     Thyroid disease Neg Hx    ,   Social History     Socioeconomic History    Marital status:    Tobacco Use    Smoking status: Never     Passive exposure: Never    Smokeless tobacco: Never   Vaping Use    Vaping status: Never Used   Substance and Sexual Activity    Alcohol use: No    Drug use: Never     Comment: Pt has track marks on right arm. Pt states \"It's from my INR being drawn.\"     Sexual activity: Not Currently     Partners: Male     Birth control/protection: None     E-cigarette/Vaping    E-cigarette/Vaping Use Never User      E-cigarette/Vaping Substances    Nicotine No     THC No     CBD No     Flavoring No      E-cigarette/Vaping Devices    Disposable No     Pre-filled or Refillable Cartridge No     Refillable Tank No     Pre-filled Pod No          ,   Medications Prior to Admission   Medication Sig Dispense Refill Last Dose/Taking    allopurinol (ZYLOPRIM) 100 MG tablet Take 1 tablet by mouth Daily. 30 tablet 3     amitriptyline (ELAVIL) 25 MG tablet Take 1 tablet by mouth every night at bedtime.       amLODIPine (NORVASC) 10 MG tablet Take 1 tablet by mouth Daily.       azelastine (ASTELIN) 0.1 % nasal spray 2 sprays both nostrils twice daily as needed 1 each 3     Baclofen (LIORESAL) 5 MG tablet Take 1 tablet by mouth Every 12 (Twelve) Hours.       cholecalciferol (VITAMIN D3) 1.25 MG (49127 UT) capsule Take 1 capsule by mouth Every 7 (Seven) Days.       clopidogrel (PLAVIX) 75 MG tablet Take 1 tablet by mouth Daily. 30 tablet 3     Continuous Blood Gluc Sensor (Dexcom G6 Sensor) Every 10 (Ten) Days. 9 each 3     cyanocobalamin ([employer] Vitamin B-12) 2000 MCG tablet Take 1 tablet by mouth Daily. 30 tablet 2     DULoxetine (CYMBALTA) 30 MG capsule Take 1 capsule by mouth 2 (Two) Times a " Day. 60 capsule 2     Enoxaparin Sodium (LOVENOX) 120 MG/0.8ML solution prefilled syringe syringe Inject 0.8 mL under the skin into the appropriate area as directed Every 12 (Twelve) Hours. 22.4 mL 0     fenofibrate (TRICOR) 145 MG tablet Take 1 tablet by mouth Daily. 90 tablet 1     fluticasone (FLONASE) 50 MCG/ACT nasal spray Administer 2 sprays into the nostril(s) as directed by provider Daily.       gabapentin (NEURONTIN) 300 MG capsule TAKE ONE CAPSULE BY MOUTH ONCE NIGHTLY AS NEEDED FOR NEUROPATHY (Patient taking differently: Take 1 capsule by mouth 3 (Three) Times a Day.) 30 capsule 0     glucose blood test strip 1 each by Other route 3 (Three) Times a Day. 100 each 5     Insulin Pen Needle 30G X 8 MM misc 1 Device 4 (Four) Times a Day. 200 each 0     Insulin Regular Human, Conc, (HumuLIN R) 500 UNIT/ML solution pen-injector CONCENTRATED injection Inject  under the skin into the appropriate area as directed.       ipratropium-albuterol (DUO-NEB) 0.5-2.5 mg/3 ml nebulizer Take 3 mL by nebulization Every 4 (Four) Hours As Needed for Shortness of Air. 150 mL 2     isosorbide mononitrate (IMDUR) 60 MG 24 hr tablet Take 1 tablet by mouth Every Morning. 30 tablet 3     Lancets Micro Thin 33G misc 1 Device 3 (Three) Times a Day. 100 each 3     lisinopril (PRINIVIL,ZESTRIL) 10 MG tablet Take 1 tablet by mouth Daily.       metoprolol succinate XL (TOPROL-XL) 50 MG 24 hr tablet Take 1 tablet by mouth Daily. (Patient taking differently: Take 1 tablet by mouth 2 (Two) Times a Day.) 30 tablet 0     mometasone-formoterol (DULERA 200) 200-5 MCG/ACT inhaler 2 puffs twice daily 1 g 2     montelukast (SINGULAIR) 10 MG tablet Take 1 tablet by mouth Every Night. 30 tablet 3     oxyCODONE (ROXICODONE) 10 MG tablet Take 1 tablet by mouth.       pantoprazole (PROTONIX) 40 MG EC tablet Take 1 tablet by mouth Daily.       promethazine (PHENERGAN) 25 MG tablet Take 1 tablet by mouth Every 12 (Twelve) Hours As Needed for Nausea. 20  tablet 0     promethazine-dextromethorphan (PROMETHAZINE-DM) 6.25-15 MG/5ML syrup Take 5 mL by mouth 4 (Four) Times a Day As Needed.       Respiratory Therapy Supplies (Nebulizer/Tubing/Mouthpiece) kit 1 each Take As Directed. 1 each 1     rosuvastatin (CRESTOR) 20 MG tablet Take 1 tablet by mouth Every Night. (Patient taking differently: Take 2 tablets by mouth Every Night.) 30 tablet 5     tamsulosin (FLOMAX) 0.4 MG capsule 24 hr capsule Take 1 capsule by mouth Daily. 30 capsule 5     topiramate (TOPAMAX) 25 MG tablet Take 1 tablet by mouth Daily. 30 tablet 0     Ventolin  (90 Base) MCG/ACT inhaler INHALE TWO PUFFS BY MOUTH EVERY 4 HOURS AS NEEDED FOR BREATHING 18 g 3    , Scheduled Meds:  cefepime, 2,000 mg, Intravenous, Q12H  chlorhexidine, 15 mL, Mouth/Throat, Q12H  famotidine, 20 mg, Intravenous, BID  mupirocin, 1 Application, Each Nare, BID  senna-docusate sodium, 2 tablet, Nasogastric, BID  sodium chloride, 10 mL, Intravenous, Q12H  sodium zirconium cyclosilicate, 10 g, Nasogastric, TID   , Continuous Infusions:  fentanyl 10 mcg/mL,  mcg/hr, Last Rate: 50 mcg/hr (01/02/25 1232)  heparin, 9 Units/kg/hr  insulin, 0-100 Units/hr, Last Rate: 10.1 Units/hr (01/02/25 1417)  Midazolam 1 mg/mL 100mL NS, 1-10 mg/hr, Last Rate: 10 mg/hr (01/02/25 1232)  norepinephrine, 0.02-0.3 mcg/kg/min, Last Rate: 0.06 mcg/kg/min (01/02/25 1317)  Pharmacy to Dose Heparin,   Pharmacy to dose vancomycin,    , PRN Meds:    senna-docusate sodium **AND** polyethylene glycol **AND** [DISCONTINUED] bisacodyl **AND** bisacodyl    dextrose    dextrose    fentaNYL    glucagon (human recombinant)    ipratropium    ipratropium-albuterol    Pharmacy to Dose Heparin    Pharmacy to dose vancomycin    sodium chloride    sodium chloride, and Allergies:  Salvia officinalis, Shellfish allergy, Toradol [ketorolac tromethamine], Tree nut, Haldol [haloperidol], Tramadol, Amoxicillin, and Penicillins    Review of Systems  Unable to perform  "secondary to patient factor       Objective     Vital Signs  Temp:  [98.2 °F (36.8 °C)-104 °F (40 °C)] 98.4 °F (36.9 °C)  Heart Rate:  [] 101  Resp:  [20-35] 20  BP: ()/(38-88) 110/46  Arterial Line BP: (120-149)/(61-85) 143/62  FiO2 (%):  [80 %-100 %] 100 %    I/O this shift:  In: 404 [I.V.:304; Other:100]  Out: 950 [Urine:950]  No intake/output data recorded.    Physical Exam:  Gen: intubated and sedated.    HENT: NC, AT  NECK: Supple, ETT in place  LUNGS: Coarse breath sound on vent, non labored respirtation   CVS: S1/S2 audible, RRR, no murmur   Abd: Soft, NT, ND, BS+   Ext: Trace edema, no cyanosis   CNS: Intubated and sedated.   Psy: Intubated and sedated.   Skin: Warm, dry and intact      Results Review:   I reviewed the patient's new clinical results.    WBC WBC   Date Value Ref Range Status   01/02/2025 9.71 3.40 - 10.80 10*3/mm3 Final   01/02/2025 10.48 3.40 - 10.80 10*3/mm3 Final   01/01/2025 13.32 (H) 3.40 - 10.80 10*3/mm3 Final   12/31/2024 6 3.70 - 10.30 10*3/uL Final      HGB Hemoglobin   Date Value Ref Range Status   01/02/2025 8.5 (L) 12.0 - 15.9 g/dL Final   01/02/2025 8.4 (L) 12.0 - 15.9 g/dL Final   01/01/2025 9.3 (L) 12.0 - 15.9 g/dL Final   12/31/2024 10.2 (L) 11.2 - 15.7 g/dL Final      HCT Hematocrit   Date Value Ref Range Status   01/02/2025 28.0 (L) 34.0 - 46.6 % Final   01/02/2025 28.1 (L) 34.0 - 46.6 % Final   01/01/2025 31.2 (L) 34.0 - 46.6 % Final   12/31/2024 31.9 (L) 34.0 - 45.0 % Final      Platlets No results found for: \"LABPLAT\"   MCV MCV   Date Value Ref Range Status   01/02/2025 95.6 79.0 - 97.0 fL Final   01/02/2025 97.6 (H) 79.0 - 97.0 fL Final   01/01/2025 96.9 79.0 - 97.0 fL Final   12/31/2024 91 79 - 98 fL Final          Sodium Sodium   Date Value Ref Range Status   01/02/2025 136 136 - 145 mmol/L Final   01/02/2025 137 136 - 145 mmol/L Final   01/02/2025 136 136 - 145 mmol/L Final   01/02/2025 136 136 - 145 mmol/L Final   01/01/2025 136 136 - 145 mmol/L Final "   01/01/2025 133 (L) 136 - 145 mmol/L Final      Potassium Potassium   Date Value Ref Range Status   01/02/2025 6.1 (C) 3.5 - 5.2 mmol/L Final   01/02/2025 6.1 (C) 3.5 - 5.2 mmol/L Final   01/02/2025 7.0 (C) 3.5 - 5.2 mmol/L Final     Comment:     Slight hemolysis detected by analyzer. Result may be falsely elevated.   01/02/2025 6.6 (C) 3.5 - 5.2 mmol/L Final   01/01/2025 7.2 (C) 3.5 - 5.2 mmol/L Final     Comment:     Slight hemolysis detected by analyzer. Result may be falsely elevated.   01/01/2025 8.2 (C) 3.5 - 5.2 mmol/L Final      Chloride Chloride   Date Value Ref Range Status   01/02/2025 100 98 - 107 mmol/L Final   01/02/2025 100 98 - 107 mmol/L Final   01/02/2025 101 98 - 107 mmol/L Final   01/02/2025 99 98 - 107 mmol/L Final   01/01/2025 101 98 - 107 mmol/L Final   01/01/2025 98 98 - 107 mmol/L Final      CO2 CO2   Date Value Ref Range Status   01/02/2025 21.0 (L) 22.0 - 29.0 mmol/L Final   01/02/2025 22.6 22.0 - 29.0 mmol/L Final   01/02/2025 22.3 22.0 - 29.0 mmol/L Final   01/02/2025 20.4 (L) 22.0 - 29.0 mmol/L Final   01/01/2025 19.5 (L) 22.0 - 29.0 mmol/L Final   01/01/2025 19.5 (L) 22.0 - 29.0 mmol/L Final      BUN BUN   Date Value Ref Range Status   01/02/2025 39 (H) 6 - 20 mg/dL Final   01/02/2025 40 (H) 6 - 20 mg/dL Final   01/02/2025 41 (H) 6 - 20 mg/dL Final   01/02/2025 41 (H) 6 - 20 mg/dL Final   01/01/2025 40 (H) 6 - 20 mg/dL Final   01/01/2025 40 (H) 6 - 20 mg/dL Final      Creatinine Creatinine   Date Value Ref Range Status   01/02/2025 1.69 (H) 0.57 - 1.00 mg/dL Final   01/02/2025 2.39 (H) 0.57 - 1.00 mg/dL Final   01/02/2025 2.50 (H) 0.57 - 1.00 mg/dL Final   01/02/2025 3.02 (H) 0.57 - 1.00 mg/dL Final   01/01/2025 2.88 (H) 0.57 - 1.00 mg/dL Final   01/01/2025 2.85 (H) 0.57 - 1.00 mg/dL Final      Calcium Calcium   Date Value Ref Range Status   01/02/2025 8.8 8.6 - 10.5 mg/dL Final   01/02/2025 8.1 (L) 8.6 - 10.5 mg/dL Final   01/02/2025 8.4 (L) 8.6 - 10.5 mg/dL Final   01/02/2025 8.4  "(L) 8.6 - 10.5 mg/dL Final   01/01/2025 8.2 (L) 8.6 - 10.5 mg/dL Final   01/01/2025 8.3 (L) 8.6 - 10.5 mg/dL Final      PO4 No results found for: \"CAPO4\"   Albumin Albumin   Date Value Ref Range Status   01/02/2025 3.4 (L) 3.5 - 5.2 g/dL Final   01/02/2025 3.5 3.5 - 5.2 g/dL Final   01/01/2025 3.5 3.5 - 5.2 g/dL Final      Magnesium Magnesium   Date Value Ref Range Status   01/02/2025 1.7 1.6 - 2.6 mg/dL Final   01/02/2025 1.8 1.6 - 2.6 mg/dL Final   01/02/2025 2.0 1.6 - 2.6 mg/dL Final   01/01/2025 1.5 (L) 1.6 - 2.6 mg/dL Final      Uric Acid No results found for: \"URICACID\"     Results from last 7 days   Lab Units 01/02/25  1314 01/02/25  0900 01/02/25  0453 01/02/25  0051 01/01/25  2333 01/01/25  2145 01/01/25 2039 12/31/24  1440   SODIUM mmol/L 136 137 136 136   < > 133*  --   --    POTASSIUM mmol/L 6.1* 6.1* 7.0* 6.6*   < > 8.2*  --   --    CHLORIDE mmol/L 100 100 101 99   < > 98  --   --    CO2 mmol/L 21.0* 22.6 22.3 20.4*   < > 19.5*  --   --    BUN mg/dL 39* 40* 41* 41*   < > 40*  --   --    CREATININE mg/dL 1.69* 2.39* 2.50* 3.02*   < > 2.85*  --   --    CALCIUM mg/dL 8.8 8.1* 8.4* 8.4*   < > 8.3*  --   --    ALBUMIN g/dL  --  3.4* 3.5  --   --  3.5  --   --    WBC 10*3/mm3 9.71 10.48  --   --   --   --  13.32* 6   HEMOGLOBIN g/dL 8.5* 8.4*  --   --   --   --  9.3* 10.2*   PLATELETS 10*3/mm3 222 232  --   --   --   --  252 200    < > = values in this interval not displayed.       Intake/Output Summary (Last 24 hours) at 1/2/2025 1517  Last data filed at 1/2/2025 1417  Gross per 24 hour   Intake 404 ml   Output 950 ml   Net -546 ml           cefepime, 2,000 mg, Intravenous, Q12H  chlorhexidine, 15 mL, Mouth/Throat, Q12H  famotidine, 20 mg, Intravenous, BID  mupirocin, 1 Application, Each Nare, BID  senna-docusate sodium, 2 tablet, Nasogastric, BID  sodium chloride, 10 mL, Intravenous, Q12H  sodium zirconium cyclosilicate, 10 g, Nasogastric, TID      fentanyl 10 mcg/mL,  mcg/hr, Last Rate: 50 mcg/hr " (01/02/25 1232)  insulin, 0-100 Units/hr, Last Rate: 10.1 Units/hr (01/02/25 1417)  Midazolam 1 mg/mL 100mL NS, 1-10 mg/hr, Last Rate: 10 mg/hr (01/02/25 1232)  norepinephrine, 0.02-0.3 mcg/kg/min, Last Rate: 0.06 mcg/kg/min (01/02/25 1317)    FINDINGS:   Right Kidney: The right kidney is normal in size and echogenicity measuring 8.11 cm.  There are no contour deforming masses or calculi.  There is no hydronephrosis.     Left Kidney: Surgically absent     Assessment & Plan       MARIAA (acute kidney injury)    Type 2 diabetes mellitus with hyperglycemia    Acute respiratory failure with hypercapnia    Hyperkalemia    Sepsis    Respiratory acidosis with metabolic acidosis      1-MARIAA- non oliguric - Pre-renal azotemia vs sepsis related MARIAA. UA - RBC 3-5, + protein. 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 At home on lisinopril, metformin and topiramate. Improving with IV hydration and hemodynamic support.   2- Hyperkalemia - improving - with lokelma and bicarb infusion  3- Hyperglycemia   4- Anemia   5- Shock   6- hx of Factor V Leiden deficiency   7- hx of DM   8- Respiratory distress - suspicion of aspiration pneumonia   9- Hx of Gout     Plan:  - Tight glycemic control. Insulin drip   - IV hydration.   - Monitor I/O   - Avoid nephrotoxic agents.   - Renal diet   - Adjust meds per renal function   - No emergent need of RRT   - Monitor H/H and transfuse for Hgb less than 7.0   - Urine lytes     I discussed the patients findings and my recommendations with nursing staff and primary care team    Janessa Esparza MD  01/02/25            Electronically signed by Janessa Esparza MD at 01/02/25 5884

## 2025-01-03 NOTE — PROGRESS NOTES
Clinical Nutrition     Patient Name: Natalia Arzate  YOB: 1985  MRN: [id number]  Date of Encounter: 25 13:29 EST  Admission date: 2025  Reason for Visit: MDR, Identified at risk by screening criteria, EN    Assessment   Nutrition Assessment   Admission Diagnosis:  Acute respiratory failure [J96.00]    Problem List:    Acute respiratory failure with hypercapnia    MARIAA (acute kidney injury)    Type 2 diabetes mellitus with hyperglycemia    Hyperkalemia    Sepsis    Respiratory acidosis with metabolic acidosis    Acute respiratory failure      PMH:   She  has a past medical history of A-fib, Abnormal ECG, Anemia, Anxiety, Asthma, Cancer, Depression, Diabetes mellitus, DVT (deep venous thrombosis), Factor 5 Leiden mutation, heterozygous, Fibroid, GERD (gastroesophageal reflux disease), Gout, H/O abdominal abscess, History of sepsis, History of transfusion, Hyperlipidemia, Hypothyroid, Kidney stone, Migraines, Neuropathy, Ovarian cancer (2021), Ovarian cyst, PE (pulmonary embolism), Polycystic ovary syndrome, Preeclampsia, Rh incompatibility, Stroke, TIA (transient ischemic attack), Urinary tract infection, and Varicella.    PSH:  She  has a past surgical history that includes  section; Cholecystectomy; Colonoscopy; Cardiac catheterization; Right oophorectomy; Abdominal surgery; Esophagogastroduodenoscopy; ureteroscopy laser lithotripsy with stent insertion (Left, 10/01/2021); Extracorporeal shock wave lithotripsy (Left, 10/22/2021); Lithotripsy; ureteroscopy laser lithotripsy with stent insertion (Left, 2022); ureteroscopy laser lithotripsy with stent insertion (Left, 2022); Nephrectomy (Left, 10/2022); and Hysterectomy ().    Substance history: Opioids    Applicable Nutrition History:   ARF/VENT  25      Labs    Labs Reviewed: Yes    Results from last 7 days   Lab Units 25  0433 25  0018 25  1538 25  1314  01/02/25  0900 01/02/25  0610 01/02/25  0453   GLUCOSE mg/dL 147*  147* 168* 234*   < > 377* 315*  --  314*   BUN mg/dL 28*  28* 31* 34*   < > 39* 40*  --  41*   CREATININE mg/dL 1.29*  1.29* 1.25* 1.38*   < > 1.69* 2.39*  --  2.50*   SODIUM mmol/L 142  142 143 134*   < > 136 137  --  136   CHLORIDE mmol/L 105  105 105 98   < > 100 100  --  101   POTASSIUM mmol/L 4.2  4.2 4.4 4.7   < > 6.1* 6.1*  --  7.0*   PHOSPHORUS mg/dL 2.7  --   --   --  4.5 5.3*  --   --    MAGNESIUM mg/dL 2.0  --   --   --  1.7 1.8  --   --    ALT (SGPT) U/L 54*  --   --   --   --  71*  --  71*   LACTATE mmol/L  --   --  2.8*  --   --  2.4* 2.7*  --     < > = values in this interval not displayed.       Results from last 7 days   Lab Units 01/03/25  0433 01/02/25  0900 01/02/25  0453   ALBUMIN g/dL 3.3* 3.4* 3.5       Results from last 7 days   Lab Units 01/03/25  1259 01/03/25  1153 01/03/25  1038 01/03/25  0943 01/03/25  0835 01/03/25  0725   GLUCOSE mg/dL 118 116 116 122 141* 129     Lab Results   Lab Value Date/Time    HGBA1C 9.10 (H) 01/01/2025 2133    HGBA1C 9.6 (H) 12/16/2024 1154    HGBA1C 9.9 (H) 10/15/2024 0505     Results from last 7 days   Lab Units 01/02/25  1314   URIC ACID mg/dL 7.6*     Results from last 7 days   Lab Units 01/02/25  0315 01/01/25  2039   PROBNP pg/mL 6,035.0* 5,671.0*       Medications    Medications Reviewed: yes    Scheduled Meds:cefepime, 2,000 mg, Intravenous, Q8H  chlorhexidine, 15 mL, Mouth/Throat, Q12H  famotidine, 20 mg, Intravenous, BID  metroNIDAZOLE, 500 mg, Intravenous, Q8H  mupirocin, 1 Application, Each Nare, BID  senna-docusate sodium, 2 tablet, Nasogastric, BID  sodium chloride, 10 mL, Intravenous, Q12H  vancomycin, 1,250 mg, Intravenous, Q12H      Continuous Infusions:amiodarone, 0.5 mg/min, Last Rate: 0.5 mg/min (01/03/25 1040)  fentanyl 10 mcg/mL,  mcg/hr, Last Rate: 100 mcg/hr (01/02/25 1808)  heparin, 17 Units/kg/hr, Last Rate: 17 Units/kg/hr (01/03/25 0620)  insulin, 0-100  Units/hr, Last Rate: 16.9 Units/hr (01/03/25 1300)  Midazolam 1 mg/mL 100mL NS, 1-10 mg/hr, Last Rate: 10 mg/hr (01/03/25 1159)  norepinephrine, 0.02-0.3 mcg/kg/min, Last Rate: Stopped (01/03/25 1200)  Pharmacy to Dose Heparin,   Pharmacy to dose vancomycin,   sodium chloride, 100 mL/hr, Last Rate: 100 mL/hr (01/03/25 0830)      PRN Meds:.  acetaminophen    senna-docusate sodium **AND** polyethylene glycol **AND** [DISCONTINUED] bisacodyl **AND** bisacodyl    dextrose    dextrose    fentaNYL    glucagon (human recombinant)    ipratropium    ipratropium-albuterol    Pharmacy to Dose Heparin    Pharmacy to dose vancomycin    sodium chloride    sodium chloride    Intake/Ouptut 24 hrs (0701 - 0700)   I&O's Reviewed: yes    Intake & Output (last day)         01/02 0701 01/03 0700 01/03 0701 01/04 0700    I.V. 2294.1 2044    Other 100     IV Piggyback 470.8     Total Intake 2864.9 2044    Urine 3500 140    Total Output 3500 140    Net -635.1 +1904                 Anthropometrics     Height:  64in  Last Filed Weight:  350lb  Method:  est  BMI:  60    UBW: last MD office weight 336lb    Weight change:  ? 27lb wt gain since January     Weight       Weight (kg) Weight (lbs) Weight Method Visit Report   5/24/2023 129.729 kg  286 lb  Stated  --     127.007 kg  280 lb   --    6/24/2023 127.007 kg  280 lb  Stated     7/6/2023 --  --   --    7/26/2023 129.275 kg  285 lb      7/27/2023 132.904 kg  293 lb   --    8/15/2023 124.739 kg  275 lb  Stated     8/17/2023 127.007 kg  280 lb  Stated     9/10/2023 129.275 kg  285 lb  Stated     10/6/2023 140.161 kg (H)  309 lb (H)   --    11/7/2023 133.811 kg  295 lb  Stated     11/20/2023 133.811 kg  295 lb      11/24/2023 133.358 kg  294 lb      11/26/2023 133.811 kg  295 lb  Stated     1/2/2024 140.161 kg (H)  309 lb (H)  Stated     2/13/2024 145.605 kg (H)  321 lb (H)  Stated     3/12/2024 --  --   --    4/16/2024 142.611 kg (H)  314 lb 6.4 oz (H)   --    4/18/2024 142.429 kg (H)  314 lb  (H)  Stated     4/26/2024 138.801 kg (H)  306 lb (H)  Stated     5/1/2024 138.801 kg (H)  306 lb (H)   --    5/6/2024 146.512 kg (H)  323 lb (H)   --    5/13/2024 146.512 kg (H)  323 lb (H)  Stated     7/8/2024 142.883 kg (H)  315 lb (H)  Stated     7/31/2024 147.691 kg (H)  325 lb 9.6 oz (H)   --    9/22/2024 143.79 kg (H)  317 lb (H)  Stated     12/3/2024 153.588 kg (H)  338 lb 9.6 oz (H)   --    12/16/2024 153.316 kg (H)  338 lb (H)   --     152.409 kg (H)  336 lb (H)   --    1/1/2025 158.759 kg (H)  350 lb (H)  Estimated        Legend:  (H) High  Nutrition Focused Physical Exam     Date:     Unable to perform due to Pt unable to participate at time of visit     Subjective   Reported/Observed/Food/Nutrition Related History:     Pt intubated, sedated, fiancee at bedside  + insulin, fentanyl, versed, heparin, amio    Per RN: pt had wide complex rhythm last night, lokelma on hold as K+ wnl, 103 temp    Per MD ok to start TF,  place keofeed    Needs Assessment   Date: 1-3-25    Height used: 64in  Weights used/ ABW: 336lb/ 153kg   IBW: 120lb/ 55kg    Estimated Calorie needs: ~1500kcal  Method:  22-25Kcals/KG IBW: 1210-1375kcal  Method:  MSJ ABW: 2195kcal  Method:  PSU ABW: 2745kcal    Estimated Protein needs: ~ 138g protein  Method: 2.5g protein per kg IBW: 138g protein  Method: 0.8-1g protein per kg ABW: 122-153g protein    Current Nutrition Prescription     PO: NPO Diet NPO Type: Tube Feeding  Oral Nutrition Supplement:  Intake: N/A    Assessment & Plan   Nutrition Diagnosis   Date: 1-3-25  Updated:   Problem Inadequate energy intake    Etiology ARF/VENT   Signs/Symptoms NPO   Status: New    Goal:   Nutrition to support treatment and Initiate EN      Nutrition Intervention      Follow treatment progress, Care plan reviewed, Nutrition support order placed    Once keofeed position confirmed per KUB, start Peptamen VHP at 25ml, advance gradually as tolerated to goal rate @75ml/hr, free water @25ml/hr    TF at goal  volume will provide 1500ml, 1500kcal,100% kcal needs, 138g protein, 100% protein needs, 38meq K+, 1020mg Phos, 6g fiber, 1760ml free water    Monitoring/Evaluation:   Per protocol, I&O, Pertinent labs, Weight, Skin status, GI status, Symptoms, Hemodynamic stability    Maria Del Carmen Matamoros, RD  Time Spent: 45min

## 2025-01-04 ENCOUNTER — APPOINTMENT (OUTPATIENT)
Dept: GENERAL RADIOLOGY | Facility: HOSPITAL | Age: 40
DRG: 870 | End: 2025-01-04
Payer: COMMERCIAL

## 2025-01-04 ENCOUNTER — APPOINTMENT (OUTPATIENT)
Dept: CARDIOLOGY | Facility: HOSPITAL | Age: 40
DRG: 870 | End: 2025-01-04
Payer: COMMERCIAL

## 2025-01-04 LAB
ABO GROUP BLD: NORMAL
ABO GROUP BLD: NORMAL
ALBUMIN SERPL-MCNC: 3 G/DL (ref 3.5–5.2)
ALBUMIN/GLOB SERPL: 0.8 G/DL
ALP SERPL-CCNC: 155 U/L (ref 39–117)
ALT SERPL W P-5'-P-CCNC: 34 U/L (ref 1–33)
ANION GAP SERPL CALCULATED.3IONS-SCNC: 11 MMOL/L (ref 5–15)
ARTERIAL PATENCY WRIST A: ABNORMAL
ASCENDING AORTA: 2.6 CM
AST SERPL-CCNC: 38 U/L (ref 1–32)
ATMOSPHERIC PRESS: ABNORMAL MM[HG]
AV MEAN PRESS GRAD SYS DOP V1V2: 20 MMHG
AV VMAX SYS DOP: 292 CM/SEC
BACTERIA SPEC AEROBE CULT: ABNORMAL
BACTERIA SPEC AEROBE CULT: ABNORMAL
BACTERIA SPEC AEROBE CULT: NO GROWTH
BACTERIA SPEC RESP CULT: ABNORMAL
BACTERIA SPEC RESP CULT: ABNORMAL
BASE EXCESS BLDA CALC-SCNC: -2.7 MMOL/L (ref 0–2)
BDY SITE: ABNORMAL
BH CV ECHO MEAS - AO MAX PG: 31.5 MMHG
BH CV ECHO MEAS - AO ROOT DIAM: 2.8 CM
BH CV ECHO MEAS - AO V2 VTI: 37.7 CM
BH CV ECHO MEAS - AVA(I,D): 1.54 CM2
BH CV ECHO MEAS - EDV(CUBED): 91.1 ML
BH CV ECHO MEAS - EDV(MOD-SP2): 108 ML
BH CV ECHO MEAS - EDV(MOD-SP4): 130 ML
BH CV ECHO MEAS - EF(MOD-SP2): 53.7 %
BH CV ECHO MEAS - EF(MOD-SP4): 59.8 %
BH CV ECHO MEAS - ESV(CUBED): 24.4 ML
BH CV ECHO MEAS - ESV(MOD-SP2): 50 ML
BH CV ECHO MEAS - ESV(MOD-SP4): 52.2 ML
BH CV ECHO MEAS - FS: 35.6 %
BH CV ECHO MEAS - IVS/LVPW: 0.92 CM
BH CV ECHO MEAS - IVSD: 1.1 CM
BH CV ECHO MEAS - LA DIMENSION: 3.3 CM
BH CV ECHO MEAS - LAT PEAK E' VEL: 15 CM/SEC
BH CV ECHO MEAS - LV DIASTOLIC VOL/BSA (35-75): 52.5 CM2
BH CV ECHO MEAS - LV MASS(C)D: 186.4 GRAMS
BH CV ECHO MEAS - LV MAX PG: 5.5 MMHG
BH CV ECHO MEAS - LV MEAN PG: 3 MMHG
BH CV ECHO MEAS - LV SYSTOLIC VOL/BSA (12-30): 21.1 CM2
BH CV ECHO MEAS - LV V1 MAX: 117 CM/SEC
BH CV ECHO MEAS - LV V1 VTI: 16.8 CM
BH CV ECHO MEAS - LVIDD: 4.5 CM
BH CV ECHO MEAS - LVIDS: 2.9 CM
BH CV ECHO MEAS - LVOT AREA: 3.5 CM2
BH CV ECHO MEAS - LVOT DIAM: 2.1 CM
BH CV ECHO MEAS - LVPWD: 1.2 CM
BH CV ECHO MEAS - MED PEAK E' VEL: 10 CM/SEC
BH CV ECHO MEAS - MV A MAX VEL: 82.3 CM/SEC
BH CV ECHO MEAS - MV DEC SLOPE: 768 CM/SEC2
BH CV ECHO MEAS - MV DEC TIME: 0.16 SEC
BH CV ECHO MEAS - MV E MAX VEL: 120 CM/SEC
BH CV ECHO MEAS - MV E/A: 1.46
BH CV ECHO MEAS - MV MAX PG: 6.7 MMHG
BH CV ECHO MEAS - MV MEAN PG: 3 MMHG
BH CV ECHO MEAS - MV V2 VTI: 27.6 CM
BH CV ECHO MEAS - MVA(VTI): 2.1 CM2
BH CV ECHO MEAS - RAP SYSTOLE: 8 MMHG
BH CV ECHO MEAS - RVSP: 30 MMHG
BH CV ECHO MEAS - SV(LVOT): 58 ML
BH CV ECHO MEAS - SV(MOD-SP2): 58 ML
BH CV ECHO MEAS - SV(MOD-SP4): 77.8 ML
BH CV ECHO MEAS - SVI(LVOT): 23.4 ML/M2
BH CV ECHO MEAS - SVI(MOD-SP2): 23.4 ML/M2
BH CV ECHO MEAS - SVI(MOD-SP4): 31.4 ML/M2
BH CV ECHO MEAS - TAPSE (>1.6): 2.42 CM
BH CV ECHO MEAS - TR MAX PG: 21.9 MMHG
BH CV ECHO MEAS - TR MAX VEL: 233.5 CM/SEC
BH CV ECHO MEASUREMENTS AVERAGE E/E' RATIO: 9.6
BH CV XLRA - RV BASE: 3.3 CM
BH CV XLRA - RV LENGTH: 9.6 CM
BH CV XLRA - RV MID: 2.8 CM
BH CV XLRA - TDI S': 17.3 CM/SEC
BILIRUB SERPL-MCNC: 0.3 MG/DL (ref 0–1.2)
BLD GP AB SCN SERPL QL: NEGATIVE
BODY TEMPERATURE: 37
BUN SERPL-MCNC: 37 MG/DL (ref 6–20)
BUN/CREAT SERPL: 18.6 (ref 7–25)
CALCIUM SPEC-SCNC: 8 MG/DL (ref 8.6–10.5)
CHLORIDE SERPL-SCNC: 110 MMOL/L (ref 98–107)
CO2 BLDA-SCNC: 25.3 MMOL/L (ref 22–33)
CO2 SERPL-SCNC: 23 MMOL/L (ref 22–29)
COHGB MFR BLD: 1.9 % (ref 0–2)
CREAT SERPL-MCNC: 1.99 MG/DL (ref 0.57–1)
D-LACTATE SERPL-SCNC: 0.7 MMOL/L (ref 0.5–2)
DEPRECATED RDW RBC AUTO: 67.8 FL (ref 37–54)
EGFRCR SERPLBLD CKD-EPI 2021: 32.5 ML/MIN/1.73
EPAP: 0
ERYTHROCYTE [DISTWIDTH] IN BLOOD BY AUTOMATED COUNT: 19.1 % (ref 12.3–15.4)
GLOBULIN UR ELPH-MCNC: 3.7 GM/DL
GLUCOSE BLDC GLUCOMTR-MCNC: 106 MG/DL (ref 70–130)
GLUCOSE BLDC GLUCOMTR-MCNC: 109 MG/DL (ref 70–130)
GLUCOSE BLDC GLUCOMTR-MCNC: 112 MG/DL (ref 70–130)
GLUCOSE BLDC GLUCOMTR-MCNC: 112 MG/DL (ref 70–130)
GLUCOSE BLDC GLUCOMTR-MCNC: 115 MG/DL (ref 70–130)
GLUCOSE BLDC GLUCOMTR-MCNC: 116 MG/DL (ref 70–130)
GLUCOSE BLDC GLUCOMTR-MCNC: 117 MG/DL (ref 70–130)
GLUCOSE BLDC GLUCOMTR-MCNC: 120 MG/DL (ref 70–130)
GLUCOSE BLDC GLUCOMTR-MCNC: 120 MG/DL (ref 70–130)
GLUCOSE BLDC GLUCOMTR-MCNC: 122 MG/DL (ref 70–130)
GLUCOSE BLDC GLUCOMTR-MCNC: 129 MG/DL (ref 70–130)
GLUCOSE BLDC GLUCOMTR-MCNC: 135 MG/DL (ref 70–130)
GLUCOSE SERPL-MCNC: 107 MG/DL (ref 65–99)
GRAM STN SPEC: ABNORMAL
HCO3 BLDA-SCNC: 23.8 MMOL/L (ref 20–26)
HCT VFR BLD AUTO: 23 % (ref 34–46.6)
HCT VFR BLD AUTO: 27.5 % (ref 34–46.6)
HCT VFR BLD CALC: 20.8 % (ref 38–51)
HGB BLD-MCNC: 6.7 G/DL (ref 12–15.9)
HGB BLD-MCNC: 8.2 G/DL (ref 12–15.9)
HGB BLDA-MCNC: 6.8 G/DL (ref 14–18)
INHALED O2 CONCENTRATION: 90 %
IPAP: 0
ISOLATED FROM: ABNORMAL
ISOLATED FROM: ABNORMAL
IVRT: 66 MS
LEFT ATRIUM VOLUME INDEX: 18.5 ML/M2
LV EF BIPLANE MOD: 59.3 %
MCH RBC QN AUTO: 29 PG (ref 26.6–33)
MCHC RBC AUTO-ENTMCNC: 29.1 G/DL (ref 31.5–35.7)
MCV RBC AUTO: 99.6 FL (ref 79–97)
METHGB BLD QL: 0.2 % (ref 0–1.5)
MODALITY: ABNORMAL
OXYHGB MFR BLDV: 89.3 % (ref 94–99)
PAW @ PEAK INSP FLOW SETTING VENT: 0 CMH2O
PCO2 BLDA: 50.1 MM HG (ref 35–45)
PCO2 TEMP ADJ BLD: 50.1 MM HG (ref 35–45)
PEEP RESPIRATORY: 14 CM[H2O]
PH BLDA: 7.29 PH UNITS (ref 7.35–7.45)
PH, TEMP CORRECTED: 7.29 PH UNITS
PLATELET # BLD AUTO: 195 10*3/MM3 (ref 140–450)
PMV BLD AUTO: 9.5 FL (ref 6–12)
PO2 BLDA: 62.2 MM HG (ref 83–108)
PO2 TEMP ADJ BLD: 62.2 MM HG (ref 83–108)
POTASSIUM SERPL-SCNC: 4.3 MMOL/L (ref 3.5–5.2)
PREALB SERPL-MCNC: 12.4 MG/DL (ref 20–40)
PROT SERPL-MCNC: 6.7 G/DL (ref 6–8.5)
RBC # BLD AUTO: 2.31 10*6/MM3 (ref 3.77–5.28)
RH BLD: NEGATIVE
RH BLD: NEGATIVE
SODIUM SERPL-SCNC: 144 MMOL/L (ref 136–145)
T&S EXPIRATION DATE: NORMAL
TOTAL RATE: 22 BREATHS/MINUTE
UFH PPP CHRO-ACNC: 0.23 IU/ML (ref 0.3–0.7)
VANCOMYCIN SERPL-MCNC: 27.9 MCG/ML (ref 5–40)
VENTILATOR MODE: ABNORMAL
VT ON VENT VENT: 0.42 ML
WBC NRBC COR # BLD AUTO: 8.93 10*3/MM3 (ref 3.4–10.8)

## 2025-01-04 PROCEDURE — 93306 TTE W/DOPPLER COMPLETE: CPT

## 2025-01-04 PROCEDURE — 25010000002 FENTANYL 10 MCG/1 ML NS: Performed by: NURSE PRACTITIONER

## 2025-01-04 PROCEDURE — 25010000002 AMPICILLIN PER 500 MG

## 2025-01-04 PROCEDURE — 25010000002 CHLOROTHIAZIDE PER 500 MG: Performed by: NURSE PRACTITIONER

## 2025-01-04 PROCEDURE — 25010000002 BUMETANIDE PER 0.5 MG: Performed by: INTERNAL MEDICINE

## 2025-01-04 PROCEDURE — 25010000002 ALBUMIN HUMAN 25% PER 50 ML: Performed by: INTERNAL MEDICINE

## 2025-01-04 PROCEDURE — P9047 ALBUMIN (HUMAN), 25%, 50ML: HCPCS | Performed by: INTERNAL MEDICINE

## 2025-01-04 PROCEDURE — 99291 CRITICAL CARE FIRST HOUR: CPT | Performed by: INTERNAL MEDICINE

## 2025-01-04 PROCEDURE — 86901 BLOOD TYPING SEROLOGIC RH(D): CPT | Performed by: NURSE PRACTITIONER

## 2025-01-04 PROCEDURE — 85018 HEMOGLOBIN: CPT | Performed by: INTERNAL MEDICINE

## 2025-01-04 PROCEDURE — 87205 SMEAR GRAM STAIN: CPT | Performed by: INTERNAL MEDICINE

## 2025-01-04 PROCEDURE — 25010000002 HEPARIN (PORCINE) 25000-0.45 UT/250ML-% SOLUTION

## 2025-01-04 PROCEDURE — 71045 X-RAY EXAM CHEST 1 VIEW: CPT

## 2025-01-04 PROCEDURE — 87186 SC STD MICRODIL/AGAR DIL: CPT | Performed by: INTERNAL MEDICINE

## 2025-01-04 PROCEDURE — 86901 BLOOD TYPING SEROLOGIC RH(D): CPT

## 2025-01-04 PROCEDURE — 86900 BLOOD TYPING SEROLOGIC ABO: CPT

## 2025-01-04 PROCEDURE — 82948 REAGENT STRIP/BLOOD GLUCOSE: CPT

## 2025-01-04 PROCEDURE — 25010000002 SULFUR HEXAFLUORIDE MICROSPH 60.7-25 MG RECONSTITUTED SUSPENSION: Performed by: INTERNAL MEDICINE

## 2025-01-04 PROCEDURE — 85014 HEMATOCRIT: CPT | Performed by: INTERNAL MEDICINE

## 2025-01-04 PROCEDURE — 83050 HGB METHEMOGLOBIN QUAN: CPT

## 2025-01-04 PROCEDURE — 94799 UNLISTED PULMONARY SVC/PX: CPT

## 2025-01-04 PROCEDURE — 86920 COMPATIBILITY TEST SPIN: CPT

## 2025-01-04 PROCEDURE — 86900 BLOOD TYPING SEROLOGIC ABO: CPT | Performed by: NURSE PRACTITIONER

## 2025-01-04 PROCEDURE — 25010000002 FUROSEMIDE PER 20 MG: Performed by: NURSE PRACTITIONER

## 2025-01-04 PROCEDURE — 83605 ASSAY OF LACTIC ACID: CPT | Performed by: INTERNAL MEDICINE

## 2025-01-04 PROCEDURE — 31624 DX BRONCHOSCOPE/LAVAGE: CPT | Performed by: INTERNAL MEDICINE

## 2025-01-04 PROCEDURE — 80053 COMPREHEN METABOLIC PANEL: CPT | Performed by: INTERNAL MEDICINE

## 2025-01-04 PROCEDURE — 82805 BLOOD GASES W/O2 SATURATION: CPT

## 2025-01-04 PROCEDURE — 36430 TRANSFUSION BLD/BLD COMPNT: CPT

## 2025-01-04 PROCEDURE — 25010000002 MIDAZOLAM 1 MG/ML 100ML NS 100 MG/100ML SOLUTION: Performed by: NURSE PRACTITIONER

## 2025-01-04 PROCEDURE — 80202 ASSAY OF VANCOMYCIN: CPT

## 2025-01-04 PROCEDURE — 87070 CULTURE OTHR SPECIMN AEROBIC: CPT | Performed by: INTERNAL MEDICINE

## 2025-01-04 PROCEDURE — 87147 CULTURE TYPE IMMUNOLOGIC: CPT | Performed by: INTERNAL MEDICINE

## 2025-01-04 PROCEDURE — 25010000002 METRONIDAZOLE 500 MG/100ML SOLUTION: Performed by: INTERNAL MEDICINE

## 2025-01-04 PROCEDURE — 82375 ASSAY CARBOXYHB QUANT: CPT

## 2025-01-04 PROCEDURE — 25010000002 CEFEPIME PER 500 MG

## 2025-01-04 PROCEDURE — 0BJ08ZZ INSPECTION OF TRACHEOBRONCHIAL TREE, VIA NATURAL OR ARTIFICIAL OPENING ENDOSCOPIC: ICD-10-PCS | Performed by: INTERNAL MEDICINE

## 2025-01-04 PROCEDURE — 84134 ASSAY OF PREALBUMIN: CPT | Performed by: INTERNAL MEDICINE

## 2025-01-04 PROCEDURE — 85027 COMPLETE CBC AUTOMATED: CPT | Performed by: INTERNAL MEDICINE

## 2025-01-04 PROCEDURE — 85520 HEPARIN ASSAY: CPT

## 2025-01-04 PROCEDURE — P9016 RBC LEUKOCYTES REDUCED: HCPCS

## 2025-01-04 PROCEDURE — 86850 RBC ANTIBODY SCREEN: CPT | Performed by: NURSE PRACTITIONER

## 2025-01-04 PROCEDURE — 94003 VENT MGMT INPAT SUBQ DAY: CPT

## 2025-01-04 PROCEDURE — 93306 TTE W/DOPPLER COMPLETE: CPT | Performed by: INTERNAL MEDICINE

## 2025-01-04 RX ORDER — FUROSEMIDE 10 MG/ML
80 INJECTION INTRAMUSCULAR; INTRAVENOUS ONCE
Status: COMPLETED | OUTPATIENT
Start: 2025-01-04 | End: 2025-01-04

## 2025-01-04 RX ORDER — BUMETANIDE 0.25 MG/ML
2 INJECTION, SOLUTION INTRAMUSCULAR; INTRAVENOUS ONCE
Status: COMPLETED | OUTPATIENT
Start: 2025-01-04 | End: 2025-01-04

## 2025-01-04 RX ORDER — EPINEPHRINE 1 MG/ML
0.3 INJECTION, SOLUTION, CONCENTRATE INTRAVENOUS
Status: DISCONTINUED | OUTPATIENT
Start: 2025-01-04 | End: 2025-01-14

## 2025-01-04 RX ORDER — EPINEPHRINE IN SOD CHLOR,ISO 1 MG/10 ML
0.3 SYRINGE (ML) INTRAVENOUS
Status: DISCONTINUED | OUTPATIENT
Start: 2025-01-04 | End: 2025-01-14

## 2025-01-04 RX ORDER — ALBUMIN (HUMAN) 12.5 G/50ML
25 SOLUTION INTRAVENOUS EVERY 12 HOURS
Status: COMPLETED | OUTPATIENT
Start: 2025-01-04 | End: 2025-01-05

## 2025-01-04 RX ORDER — METHYLPREDNISOLONE SODIUM SUCCINATE 125 MG/2ML
125 INJECTION INTRAMUSCULAR; INTRAVENOUS AS NEEDED
Status: DISCONTINUED | OUTPATIENT
Start: 2025-01-04 | End: 2025-01-14

## 2025-01-04 RX ORDER — DIPHENHYDRAMINE HYDROCHLORIDE 50 MG/ML
50 INJECTION INTRAMUSCULAR; INTRAVENOUS AS NEEDED
Status: DISCONTINUED | OUTPATIENT
Start: 2025-01-04 | End: 2025-01-14

## 2025-01-04 RX ORDER — WATER 10 ML/10ML
INJECTION INTRAMUSCULAR; INTRAVENOUS; SUBCUTANEOUS
Status: COMPLETED
Start: 2025-01-04 | End: 2025-01-04

## 2025-01-04 RX ORDER — METOLAZONE 5 MG/1
20 TABLET ORAL ONCE
Status: COMPLETED | OUTPATIENT
Start: 2025-01-04 | End: 2025-01-04

## 2025-01-04 RX ORDER — VECURONIUM BROMIDE 1 MG/ML
10 INJECTION, POWDER, LYOPHILIZED, FOR SOLUTION INTRAVENOUS ONCE
Status: COMPLETED | OUTPATIENT
Start: 2025-01-04 | End: 2025-01-04

## 2025-01-04 RX ADMIN — HEPARIN SODIUM 20 UNITS/KG/HR: 10000 INJECTION, SOLUTION INTRAVENOUS at 01:52

## 2025-01-04 RX ADMIN — INSULIN HUMAN 17.5 UNITS/HR: 1 INJECTION, SOLUTION INTRAVENOUS at 23:30

## 2025-01-04 RX ADMIN — INSULIN HUMAN 21.8 UNITS/HR: 1 INJECTION, SOLUTION INTRAVENOUS at 11:01

## 2025-01-04 RX ADMIN — Medication 250 MCG/HR: at 06:14

## 2025-01-04 RX ADMIN — METRONIDAZOLE 500 MG: 500 INJECTION, SOLUTION INTRAVENOUS at 21:00

## 2025-01-04 RX ADMIN — MIDAZOLAM HYDROCHLORIDE 10 MG/HR: 1 INJECTION, SOLUTION INTRAVENOUS at 08:54

## 2025-01-04 RX ADMIN — SENNOSIDES AND DOCUSATE SODIUM 2 TABLET: 50; 8.6 TABLET ORAL at 21:00

## 2025-01-04 RX ADMIN — AMPICILLIN SODIUM 200 MG: 2 INJECTION, POWDER, FOR SOLUTION INTRAMUSCULAR; INTRAVENOUS at 16:57

## 2025-01-04 RX ADMIN — BUMETANIDE 2 MG: 0.25 INJECTION INTRAMUSCULAR; INTRAVENOUS at 09:42

## 2025-01-04 RX ADMIN — AMPICILLIN SODIUM 2 G: 2 INJECTION, POWDER, FOR SOLUTION INTRAMUSCULAR; INTRAVENOUS at 21:00

## 2025-01-04 RX ADMIN — AMPICILLIN SODIUM 20 MG: 2 INJECTION, POWDER, FOR SOLUTION INTRAMUSCULAR; INTRAVENOUS at 16:02

## 2025-01-04 RX ADMIN — SULFUR HEXAFLUORIDE 4 ML: KIT at 09:30

## 2025-01-04 RX ADMIN — METRONIDAZOLE 500 MG: 500 INJECTION, SOLUTION INTRAVENOUS at 06:44

## 2025-01-04 RX ADMIN — BUMETANIDE 2 MG: 0.25 INJECTION INTRAMUSCULAR; INTRAVENOUS at 15:00

## 2025-01-04 RX ADMIN — METRONIDAZOLE 500 MG: 500 INJECTION, SOLUTION INTRAVENOUS at 14:05

## 2025-01-04 RX ADMIN — MUPIROCIN 1 APPLICATION: 20 OINTMENT TOPICAL at 21:01

## 2025-01-04 RX ADMIN — INSULIN HUMAN 17.5 UNITS/HR: 1 INJECTION, SOLUTION INTRAVENOUS at 17:04

## 2025-01-04 RX ADMIN — ALBUMIN (HUMAN) 25 G: 0.25 INJECTION, SOLUTION INTRAVENOUS at 11:35

## 2025-01-04 RX ADMIN — Medication 300 MCG/HR: at 15:23

## 2025-01-04 RX ADMIN — VECURONIUM BROMIDE 10 MG: 1 INJECTION, POWDER, LYOPHILIZED, FOR SOLUTION INTRAVENOUS at 07:43

## 2025-01-04 RX ADMIN — MIDAZOLAM HYDROCHLORIDE 10 MG/HR: 1 INJECTION, SOLUTION INTRAVENOUS at 19:15

## 2025-01-04 RX ADMIN — ACETAMINOPHEN 650 MG: 325 TABLET ORAL at 09:52

## 2025-01-04 RX ADMIN — NOREPINEPHRINE BITARTRATE 0.06 MCG/KG/MIN: 0.03 INJECTION, SOLUTION INTRAVENOUS at 21:21

## 2025-01-04 RX ADMIN — CHLOROTHIAZIDE SODIUM 500 MG: 500 INJECTION, POWDER, LYOPHILIZED, FOR SOLUTION INTRAVENOUS at 01:50

## 2025-01-04 RX ADMIN — CEFEPIME 2000 MG: 2 INJECTION, POWDER, FOR SOLUTION INTRAVENOUS at 08:55

## 2025-01-04 RX ADMIN — CHLORHEXIDINE GLUCONATE 0.12% ORAL RINSE 15 ML: 1.2 LIQUID ORAL at 21:00

## 2025-01-04 RX ADMIN — FUROSEMIDE 80 MG: 10 INJECTION, SOLUTION INTRAMUSCULAR; INTRAVENOUS at 01:51

## 2025-01-04 RX ADMIN — INSULIN HUMAN 15.1 UNITS/HR: 1 INJECTION, SOLUTION INTRAVENOUS at 04:58

## 2025-01-04 RX ADMIN — NYSTATIN: 100000 POWDER TOPICAL at 08:56

## 2025-01-04 RX ADMIN — METOLAZONE 20 MG: 5 TABLET ORAL at 01:51

## 2025-01-04 RX ADMIN — WATER 10 ML: 1 INJECTION INTRAMUSCULAR; INTRAVENOUS; SUBCUTANEOUS at 01:51

## 2025-01-04 RX ADMIN — AMPICILLIN SODIUM 1780 MG: 2 INJECTION, POWDER, FOR SOLUTION INTRAMUSCULAR; INTRAVENOUS at 18:03

## 2025-01-04 RX ADMIN — CHLORHEXIDINE GLUCONATE 0.12% ORAL RINSE 15 ML: 1.2 LIQUID ORAL at 08:55

## 2025-01-04 RX ADMIN — FAMOTIDINE 20 MG: 10 INJECTION, SOLUTION INTRAVENOUS at 08:56

## 2025-01-04 RX ADMIN — NOREPINEPHRINE BITARTRATE 0.02 MCG/KG/MIN: 0.03 INJECTION, SOLUTION INTRAVENOUS at 06:18

## 2025-01-04 RX ADMIN — SENNOSIDES AND DOCUSATE SODIUM 2 TABLET: 50; 8.6 TABLET ORAL at 08:55

## 2025-01-04 RX ADMIN — NYSTATIN: 100000 POWDER TOPICAL at 21:00

## 2025-01-04 RX ADMIN — ALBUMIN (HUMAN) 25 G: 0.25 INJECTION, SOLUTION INTRAVENOUS at 23:57

## 2025-01-04 RX ADMIN — MUPIROCIN 1 APPLICATION: 20 OINTMENT TOPICAL at 08:56

## 2025-01-04 RX ADMIN — FAMOTIDINE 20 MG: 10 INJECTION, SOLUTION INTRAVENOUS at 21:00

## 2025-01-04 NOTE — PROGRESS NOTES
Pharmacy to Dose Heparin Infusion Note    Natalia Arzate is a 39 y.o. female receiving heparin infusion.     Therapy for (VTE/Cardiac): VTE  Patient Weight: 159 kg  Initial Bolus (Y/N): No   Any Bolus (Y/N): Yes    Signs or Symptoms of Bleeding: No S/Sx overt bleeding noted - d/w RN. H&H dropping thus heparin drip held 1/4/25 @ 0815.    VTE (PE/DVT)  Initial rate: 18 units/kg/hr (Max 1,500 units/hr)    Anti-Xa Bolus   Dose Infusion Hold   Time Infusion Rate Change (units/kg/hr) Repeat  Anti-Xa   < 0.11 50 units/kg  (4000 units Max) None Increase by  4 units/kg/hr 6 hours   0.11 - 0.19 25 units/kg  (2000 units Max) None Increase by  3 units/kg/hr 6 hours   0.2 - 0.29 0 None Increase by  2 units/kg/hr 6 hours   0.3 - 0.7 0 None No Change 6 hours (after 2 consecutive levels in range check qAM)   0.71 - 0.8 0 None Decrease by  1 units/kg/hr 6 hours   0.81 - 0.9 0 None Decrease by  2 units/kg/hr 6 hours   0.91 - 1 0 60 minutes Decrease by  3 units/kg/hr 6 hours   >1 0 Hold  After Anti-Xa less than 0.7 decrease previous rate by  4 units/kg/hr  Every 2 hours until Anti-Xa less than 0.7 then when infusion restarts in 6 hours     Results from last 7 days   Lab Units 01/04/25  0434 01/03/25  0433 01/02/25  1538 01/02/25  1314   INR   --   --  1.15*  --    HEMOGLOBIN g/dL 6.7* 7.5*  --  8.5*   HEMATOCRIT % 23.0* 25.2*  --  28.0*   PLATELETS 10*3/mm3 195 196  --  222       Date   Time   Anti-Xa Current Rate (units/kg/hr) Bolus   (units) Rate Change   (units/kg/hr) New Rate (units/kg/hr) Repeat  Anti-Xa Comments /  Pump Check    1/2 1500 pending -- -- +9 9 0000 D/w RN; will await labs, but expect this to be an aPTT assay    1/2 N/a N/a 9 -- -- 9 2100 Baseline anti-Xa 0.1, re-timed next check for 2100.    1/2 2021 0.1 9 4000 +4 13 0400 DW RN   1/3 0500 0.10 13 4000 +4 17 1200 D/w RN   1/3 1200 0.19 17 4000 +1 18 2000 D/w RN   1/3 2035 0.23 18 -- +2 20 0400 D/w RN   1/4 0545 0.23 18 -- +2 22 1200 D/w RN     1/4   0815   HOLD   22    HOLD   HOLD   HOLD   HOLD D/w ROB Kinsey & APRN; hold heparin drip for now 2/2 H&H dropping; no overt bleeding noted;   MAR placeholder entered.                                                                                                                                                    Rocky Brewster RPH  1/4/2025  08:17 EST

## 2025-01-04 NOTE — PROGRESS NOTES
Pharmacy Consult - Vancomycin Dosing and Monitoring (Renal Dysfunction / Dialysis)    Natalia Arzate is a 39 y.o. female receiving vancomycin therapy.     Indication:  Empiric, Potential Pneumonia  Consulting Provider:   Intensivist  ID Consult:  No    Goal Trough:  15-20 mcg/mL    Current Antimicrobial Therapy  Anti-Infectives (From admission, onward)      Ordered     Dose/Rate Route Frequency Start Stop    01/04/25 0833  vancomycin (dosing per levels)        Ordering Provider: Rocky Brewster RPH     Not Applicable Daily 01/04/25 0930 01/08/25 0859    01/03/25 1114  cefepime 2000 mg IVPB in 100 mL NS (MBP)        Ordering Provider: David Lazaro PharmD    2,000 mg  over 4 Hours Intravenous Every 8 Hours 01/03/25 1600 01/06/25 1559    01/02/25 1548  vancomycin (dosing per levels)  Status:  Discontinued        Ordering Provider: David Lazaro PharmD     Not Applicable Daily 01/03/25 0900 01/03/25 0738    01/03/25 0738  Vancomycin HCl 1,250 mg in sodium chloride 0.9 % 250 mL VTB  Status:  Discontinued        Ordering Provider: David Lazaro PharmD    1,250 mg  200 mL/hr over 75 Minutes Intravenous Every 12 Hours 01/03/25 0900 01/04/25 0833    01/02/25 1231  cefepime 2000 mg IVPB in 100 mL NS (MBP)  Status:  Discontinued        Ordering Provider: Bettie Walter APRN    2,000 mg  over 4 Hours Intravenous Every 12 Hours 01/02/25 2100 01/03/25 1114    01/02/25 1952  metroNIDAZOLE (FLAGYL) IVPB 500 mg        Ordering Provider: Roly Garcia MD    500 mg  200 mL/hr over 30 Minutes Intravenous Every 8 Hours 01/02/25 2100 01/09/25 2059    01/02/25 1547  vancomycin (VANCOCIN) 1,000 mg in sodium chloride 0.9 % 250 mL IVPB-VTB        Ordering Provider: David Lazaro PharmD    1,000 mg  250 mL/hr over 60 Minutes Intravenous Once 01/02/25 1645 01/02/25 1811    01/02/25 1511  Pharmacy to dose vancomycin        Ordering Provider: David Lazaro PharmD     Not Applicable Continuous PRN 01/02/25 1509  "01/09/25 1508    01/02/25 1235  cefepime 2000 mg IVPB in 100 mL NS (MBP)        Ordering Provider: Bettie Walter APRN    2,000 mg  over 30 Minutes Intravenous Once 01/02/25 1330 01/02/25 1317    01/02/25 1228  cefTRIAXone (ROCEPHIN) 2,000 mg in sodium chloride 0.9 % 100 mL MBP  Status:  Discontinued        Ordering Provider: Bettie Walter APRN    2,000 mg  200 mL/hr over 30 Minutes Intravenous Every 24 Hours 01/02/25 1315 01/02/25 1230          Allergies  Allergies as of 01/02/2025 - Reviewed 01/02/2025   Allergen Reaction Noted    Salvia officinalis Itching, Other (See Comments), and Shortness Of Breath 03/02/2023    Shellfish allergy Anaphylaxis 12/30/2018    Toradol [ketorolac tromethamine] Anaphylaxis and Hives 06/27/2016    Tree nut Anaphylaxis and Shortness Of Breath 03/16/2022    Haldol [haloperidol] Hives and Mental Status Change 03/04/2020    Tramadol Hives and Swelling 12/08/2018    Amoxicillin Hives and Rash 06/27/2016    Penicillins Hives and Rash 06/27/2016     Labs  Results from last 7 days   Lab Units 01/04/25  0434 01/03/25  0433 01/03/25  0018   BUN mg/dL 37* 29*  28*  28* 31*   CREATININE mg/dL 1.99* 1.26*  1.29*  1.29* 1.25*     Results from last 7 days   Lab Units 01/04/25  0434 01/03/25  0433 01/02/25  1314   WBC 10*3/mm3 8.93 8.23 9.71     Evaluation of Dosing     Last Dose Received in the ED/Outside Facility: 2500mg IV x 1 given 1/2 at ~2100  Is Patient on Dialysis or Renal Replacement: No; potential RRT    Height - 162.6 cm (64.02\")  Weight - (!) 159 kg (350 lb 8.5 oz)    Estimated Creatinine Clearance: 58.3 mL/min (A) (by C-G formula based on SCr of 1.99 mg/dL (H)).    Intake & Output (last 2 days)         01/02 0701 01/03 0700 01/03 0701 01/04 0700 01/04 0701 01/05 0700    I.V. (mL/kg) 2294.1 3896.1 2259.4 (14.2)    Other 100 176     NG/GT  185     IV Piggyback 470.8  76    Total Intake(mL/kg) 2864.9 4257.1 2335.4 (14.7)    Urine (mL/kg/hr) 3500 360 160 (0.3)    Total Output " 3500 360 160    Net -635.1 +3897.1 +2175.4                 Microbiology  Microbiology Results (last 10 days)       Procedure Component Value - Date/Time    Eosinophil Smear - Urine, Indwelling Urethral Catheter [[id number]]  (Normal) Collected: 01/02/25 1542    Lab Status: Final result Specimen: Urine from Indwelling Urethral Catheter Updated: 01/02/25 1700     Eosinophil Smear 0 % EOS/100 Cells     Narrative:      No eosinophil seen    MRSA Screen, PCR (Inpatient) - Swab, Nares [[id number]]  (Abnormal) Collected: 01/02/25 1246    Lab Status: Final result Specimen: Swab from Nares Updated: 01/02/25 1504     MRSA PCR Positive    Narrative:      The negative predictive value of this diagnostic test is high and should only be used to consider de-escalating anti-MRSA therapy. A positive result may indicate colonization with MRSA and must be correlated clinically.    Urine Culture - Urine, Indwelling Urethral Catheter [[id number]]  (Normal) Collected: 01/02/25 1213    Lab Status: Preliminary result Specimen: Urine from Indwelling Urethral Catheter Updated: 01/03/25 0932     Urine Culture No growth    S. Pneumo Ag Urine or CSF - Urine, Urine, Clean Catch [[id number]]  (Normal) Collected: 01/02/25 1213    Lab Status: Final result Specimen: Urine, Clean Catch Updated: 01/02/25 1913     Strep Pneumo Ag Negative    Legionella Antigen, Urine - Urine, Urine, Clean Catch [[id number]]  (Normal) Collected: 01/02/25 1213    Lab Status: Final result Specimen: Urine, Clean Catch Updated: 01/02/25 1913     LEGIONELLA ANTIGEN, URINE Negative    Respiratory Culture - Sputum, ET Suction [[id number]]  (Abnormal)  (Susceptibility) Collected: 01/02/25 1212    Lab Status: Final result Specimen: Sputum from ET Suction Updated: 01/04/25 0939     Respiratory Culture Moderate growth (3+) Staphylococcus aureus, MRSA      No Normal Respiratory Margo     Gram Stain Many (4+) WBCs per low power field      Rare (1+) Epithelial cells per low power field       Many (4+) Gram positive cocci in pairs and clusters    Susceptibility        Staphylococcus aureus, MRSA      KELSIE      Clindamycin 0.25 ug/ml Resistant      Linezolid 2 ug/ml Susceptible      Oxacillin >=4 ug/ml Resistant      Tetracycline <=1 ug/ml Susceptible      Trimethoprim + Sulfamethoxazole <=10 ug/ml Susceptible      Vancomycin <=0.5 ug/ml Susceptible                       Susceptibility Comments       Staphylococcus aureus, MRSA    This isolate is presumed to be clindamycin resistant based on detection of inducible clindamycin resistance.  Clindamycin may still be effective in some patients.               Blood Culture - Blood, Wrist, Right [[id number]]  (Abnormal) Collected: 01/01/25 2039    Lab Status: Final result Specimen: Blood from Wrist, Right Updated: 01/04/25 0707     Blood Culture Staphylococcus, coagulase negative     Isolated from Aerobic Bottle     Gram Stain Gram positive cocci in clusters    Narrative:      Probable contaminant requires clinical correlation, susceptibility not performed unless requested by physician.      Blood Culture - Blood, Wrist, Left [[id number]]  (Abnormal) Collected: 01/01/25 2039    Lab Status: Final result Specimen: Blood from Wrist, Left Updated: 01/04/25 0702     Blood Culture Gemella sanguinis     Comment: Currently there are no standardized antimicrobial testing guidelines for Gemella species. Literature search has this organism sensitive to penicillin and ampicillin.          Isolated from Anaerobic Bottle     Gram Stain Anaerobic Bottle Gram positive cocci in clusters    Blood Culture ID, PCR - Blood, Wrist, Left [[id number]] Collected: 01/01/25 2039    Lab Status: Final result Specimen: Blood from Wrist, Left Updated: 01/02/25 1604     BCID, PCR Negative by BCID PCR. Culture to Follow.     BOTTLE TYPE Anaerobic Bottle    Respiratory Panel PCR w/COVID-19(SARS-CoV-2) [facility] In-House, NP Swab in UTM/VTM, 2 HR TAT - Swab, Nasopharynx [[id number]]   (Normal) Collected: 01/01/25 2023    Lab Status: Final result Specimen: Swab from Nasopharynx Updated: 01/01/25 2201     ADENOVIRUS, PCR Not Detected     Coronavirus 229E Not Detected     Coronavirus HKU1 Not Detected     Coronavirus NL63 Not Detected     Coronavirus OC43 Not Detected     COVID19 Not Detected     Human Metapneumovirus Not Detected     Human Rhinovirus/Enterovirus Not Detected     Influenza A PCR Not Detected     Influenza B PCR Not Detected     Parainfluenza Virus 1 Not Detected     Parainfluenza Virus 2 Not Detected     Parainfluenza Virus 3 Not Detected     Parainfluenza Virus 4 Not Detected     RSV, PCR Not Detected     Bordetella pertussis pcr Not Detected     Bordetella parapertussis PCR Not Detected     Chlamydophila pneumoniae PCR Not Detected     Mycoplasma pneumo by PCR Not Detected    Narrative:      In the setting of a positive respiratory panel with a viral infection PLUS a negative procalcitonin without other underlying concern for bacterial infection, consider observing off antibiotics or discontinuation of antibiotics and continue supportive care. If the respiratory panel is positive for atypical bacterial infection (Bordetella pertussis, Chlamydophila pneumoniae, or Mycoplasma pneumoniae), consider antibiotic de-escalation to target atypical bacterial infection.          Vancomycin Levels  Results from last 7 days   Lab Units 01/04/25  0946 01/03/25  0433 01/02/25  1314   VANCOMYCIN RM mcg/mL 27.90 13.40 13.90                 Assessment/Plan:    Vancomycin dosing reviewed. Given evidence of MARIAA & solitary kidney, transition back to pulse dosing.  D/w RN - hold further vancomycin doses. Will obtain STAT level & reassess.  Given shock, OK to leave cefepime at 2 g IV q8h; however, if renal function continues to worsen then will adjust.  Pharmacy will continue to follow and adjust dose based on renal function, drug levels, and clinical status.    Thank you,  Rocky Brewster,  PharmD  01/04/25      Addendum:  STAT Vanc level in AM: 27.90 mcg/mL  Proceed w/ pulse dosing. Random again w/ AM labs given accumulation 2/2 MARIAA.

## 2025-01-04 NOTE — PLAN OF CARE
Goal Outcome Evaluation:  Plan of Care Reviewed With: patient        Progress: declining  Outcome Evaluation: Remains intubated and sedated. Multiple episodes of desaturation, with and without activity. Sedation increased with little improvement. FiO2 100%, peep increased to 16. UOP minimal- lasix, diuril, and zanoxolyn ordered.  mls. Tmax 101.3, tylenol adminstered x1. Hemoglobin 6.7 this am, no active signs of bleeding. Type & screen and 1 unit of RBC's ordered. Attempted to get in touch with son for blood consent, unable to reach.

## 2025-01-04 NOTE — PLAN OF CARE
Goal Outcome Evaluation:  Plan of Care Reviewed With: patient, family        Progress: no change     Pt remains intubated and sedated on fentanyl and versed, able to wean FIO2 to 50 %, peep still at 14, Keofeed placed, in the stomach, Ok to feed per Dr. Garcia, BUE restraints continues, UOP dropped, Dr. Velasco at bedside, 500 ml NS x 1, total  ml for the shift,  aware, another 500 ml NS x 1 @ 2000 and then monitor, TF started, levo off, amio stopped, insulin and heparin drip continues, this pm pt started desating, FiO2 increased to 100%, stat ABG and chest X ray ordered, family at bedside.

## 2025-01-04 NOTE — PROGRESS NOTES
LOS: 2 days   Patient Care Team:  Bladimir Calle PA-C as PCP - General (Physician Assistant)    Chief Complaint: MARIAA   Hyperkalemia    Subjective     No acute events overnight. No new complaints       Subjective:  Symptoms:  No shortness of breath.        History taken from: family RN    Objective     Vital Sign Min/Max for last 24 hours  Temp  Min: 98.8 °F (37.1 °C)  Max: 101.8 °F (38.8 °C)   BP  Min: 93/40  Max: 144/77   Pulse  Min: 92  Max: 105   Resp  Min: 22  Max: 31   SpO2  Min: 82 %  Max: 98 %   No data recorded   Weight  Min: 159 kg (350 lb 8.5 oz)  Max: 159 kg (350 lb 8.5 oz)         I/O this shift:  In: 2086.4 [I.V.:2010.4; IV Piggyback:76]  Out: -   I/O last 3 completed shifts:  In: 6104.6 [I.V.:5443.8; Other:176; NG/GT:185; IV Piggyback:299.8]  Out: 1760 [Urine:1760]    Objective    Gen: intubated and sedated.    HENT: NC, AT  NECK: Supple, ETT in place  LUNGS: Coarse breath sound on vent, non labored respirtation   CVS: S1/S2 audible, RRR, no murmur   Abd: Soft, NT, ND, BS+   Ext: Trace edema, no cyanosis   CNS: Intubated and sedated.   Psy: Intubated and sedated.   Skin: Warm, dry and intact      Results Review:     I reviewed the patient's new clinical results.    WBC WBC   Date Value Ref Range Status   01/04/2025 8.93 3.40 - 10.80 10*3/mm3 Final   01/03/2025 8.23 3.40 - 10.80 10*3/mm3 Final   01/02/2025 9.71 3.40 - 10.80 10*3/mm3 Final   01/02/2025 10.48 3.40 - 10.80 10*3/mm3 Final   01/01/2025 13.32 (H) 3.40 - 10.80 10*3/mm3 Final      HGB Hemoglobin   Date Value Ref Range Status   01/04/2025 6.7 (C) 12.0 - 15.9 g/dL Final     Comment:     Confirmed/called   01/03/2025 7.5 (L) 12.0 - 15.9 g/dL Final   01/02/2025 8.5 (L) 12.0 - 15.9 g/dL Final   01/02/2025 8.4 (L) 12.0 - 15.9 g/dL Final   01/01/2025 9.3 (L) 12.0 - 15.9 g/dL Final      HCT Hematocrit   Date Value Ref Range Status   01/04/2025 23.0 (L) 34.0 - 46.6 % Final   01/03/2025 25.2 (L) 34.0 - 46.6 % Final   01/02/2025 28.0 (L) 34.0 - 46.6  "% Final   01/02/2025 28.1 (L) 34.0 - 46.6 % Final   01/01/2025 31.2 (L) 34.0 - 46.6 % Final      Platlets No results found for: \"LABPLAT\"   MCV MCV   Date Value Ref Range Status   01/04/2025 99.6 (H) 79.0 - 97.0 fL Final   01/03/2025 96.9 79.0 - 97.0 fL Final   01/02/2025 95.6 79.0 - 97.0 fL Final   01/02/2025 97.6 (H) 79.0 - 97.0 fL Final   01/01/2025 96.9 79.0 - 97.0 fL Final          Sodium Sodium   Date Value Ref Range Status   01/04/2025 144 136 - 145 mmol/L Final   01/03/2025 142 136 - 145 mmol/L Final   01/03/2025 142 136 - 145 mmol/L Final   01/03/2025 142 136 - 145 mmol/L Final   01/03/2025 143 136 - 145 mmol/L Final   01/02/2025 134 (L) 136 - 145 mmol/L Final   01/02/2025 137 136 - 145 mmol/L Final   01/02/2025 136 136 - 145 mmol/L Final   01/02/2025 137 136 - 145 mmol/L Final   01/02/2025 136 136 - 145 mmol/L Final   01/02/2025 136 136 - 145 mmol/L Final   01/01/2025 136 136 - 145 mmol/L Final   01/01/2025 133 (L) 136 - 145 mmol/L Final      Potassium Potassium   Date Value Ref Range Status   01/04/2025 4.3 3.5 - 5.2 mmol/L Final   01/03/2025 4.2 3.5 - 5.2 mmol/L Final   01/03/2025 4.2 3.5 - 5.2 mmol/L Final   01/03/2025 4.2 3.5 - 5.2 mmol/L Final   01/03/2025 4.4 3.5 - 5.2 mmol/L Final   01/02/2025 4.7 3.5 - 5.2 mmol/L Final   01/02/2025 5.7 (H) 3.5 - 5.2 mmol/L Final   01/02/2025 6.1 (C) 3.5 - 5.2 mmol/L Final   01/02/2025 6.1 (C) 3.5 - 5.2 mmol/L Final   01/02/2025 7.0 (C) 3.5 - 5.2 mmol/L Final     Comment:     Slight hemolysis detected by analyzer. Result may be falsely elevated.   01/02/2025 6.6 (C) 3.5 - 5.2 mmol/L Final   01/01/2025 7.2 (C) 3.5 - 5.2 mmol/L Final     Comment:     Slight hemolysis detected by analyzer. Result may be falsely elevated.   01/01/2025 8.2 (C) 3.5 - 5.2 mmol/L Final      Chloride Chloride   Date Value Ref Range Status   01/04/2025 110 (H) 98 - 107 mmol/L Final   01/03/2025 105 98 - 107 mmol/L Final   01/03/2025 105 98 - 107 mmol/L Final   01/03/2025 105 98 - 107 mmol/L " Final   01/03/2025 105 98 - 107 mmol/L Final   01/02/2025 98 98 - 107 mmol/L Final   01/02/2025 100 98 - 107 mmol/L Final   01/02/2025 100 98 - 107 mmol/L Final   01/02/2025 100 98 - 107 mmol/L Final   01/02/2025 101 98 - 107 mmol/L Final   01/02/2025 99 98 - 107 mmol/L Final   01/01/2025 101 98 - 107 mmol/L Final   01/01/2025 98 98 - 107 mmol/L Final      CO2 CO2   Date Value Ref Range Status   01/04/2025 23.0 22.0 - 29.0 mmol/L Final   01/03/2025 24.0 22.0 - 29.0 mmol/L Final   01/03/2025 24.0 22.0 - 29.0 mmol/L Final   01/03/2025 21.0 (L) 22.0 - 29.0 mmol/L Final   01/03/2025 27.0 22.0 - 29.0 mmol/L Final   01/02/2025 22.0 22.0 - 29.0 mmol/L Final   01/02/2025 22.0 22.0 - 29.0 mmol/L Final   01/02/2025 21.0 (L) 22.0 - 29.0 mmol/L Final   01/02/2025 22.6 22.0 - 29.0 mmol/L Final   01/02/2025 22.3 22.0 - 29.0 mmol/L Final   01/02/2025 20.4 (L) 22.0 - 29.0 mmol/L Final   01/01/2025 19.5 (L) 22.0 - 29.0 mmol/L Final   01/01/2025 19.5 (L) 22.0 - 29.0 mmol/L Final      BUN BUN   Date Value Ref Range Status   01/04/2025 37 (H) 6 - 20 mg/dL Final   01/03/2025 28 (H) 6 - 20 mg/dL Final   01/03/2025 28 (H) 6 - 20 mg/dL Final   01/03/2025 29 (H) 6 - 20 mg/dL Final   01/03/2025 31 (H) 6 - 20 mg/dL Final   01/02/2025 34 (H) 6 - 20 mg/dL Final   01/02/2025 37 (H) 6 - 20 mg/dL Final   01/02/2025 39 (H) 6 - 20 mg/dL Final   01/02/2025 40 (H) 6 - 20 mg/dL Final   01/02/2025 41 (H) 6 - 20 mg/dL Final   01/02/2025 41 (H) 6 - 20 mg/dL Final   01/01/2025 40 (H) 6 - 20 mg/dL Final   01/01/2025 40 (H) 6 - 20 mg/dL Final      Creatinine Creatinine   Date Value Ref Range Status   01/04/2025 1.99 (H) 0.57 - 1.00 mg/dL Final   01/03/2025 1.29 (H) 0.57 - 1.00 mg/dL Final   01/03/2025 1.29 (H) 0.57 - 1.00 mg/dL Final   01/03/2025 1.26 (H) 0.57 - 1.00 mg/dL Final   01/03/2025 1.25 (H) 0.57 - 1.00 mg/dL Final   01/02/2025 1.38 (H) 0.57 - 1.00 mg/dL Final   01/02/2025 1.61 (H) 0.57 - 1.00 mg/dL Final   01/02/2025 1.69 (H) 0.57 - 1.00 mg/dL  "Final   01/02/2025 2.39 (H) 0.57 - 1.00 mg/dL Final   01/02/2025 2.50 (H) 0.57 - 1.00 mg/dL Final   01/02/2025 3.02 (H) 0.57 - 1.00 mg/dL Final   01/01/2025 2.88 (H) 0.57 - 1.00 mg/dL Final   01/01/2025 2.85 (H) 0.57 - 1.00 mg/dL Final      Calcium Calcium   Date Value Ref Range Status   01/04/2025 8.0 (L) 8.6 - 10.5 mg/dL Final   01/03/2025 8.8 8.6 - 10.5 mg/dL Final   01/03/2025 8.8 8.6 - 10.5 mg/dL Final   01/03/2025 8.9 8.6 - 10.5 mg/dL Final   01/03/2025 9.0 8.6 - 10.5 mg/dL Final   01/02/2025 8.7 8.6 - 10.5 mg/dL Final   01/02/2025 8.7 8.6 - 10.5 mg/dL Final   01/02/2025 8.8 8.6 - 10.5 mg/dL Final   01/02/2025 8.1 (L) 8.6 - 10.5 mg/dL Final   01/02/2025 8.4 (L) 8.6 - 10.5 mg/dL Final   01/02/2025 8.4 (L) 8.6 - 10.5 mg/dL Final   01/01/2025 8.2 (L) 8.6 - 10.5 mg/dL Final   01/01/2025 8.3 (L) 8.6 - 10.5 mg/dL Final      PO4 No results found for: \"CAPO4\"   Albumin Albumin   Date Value Ref Range Status   01/04/2025 3.0 (L) 3.5 - 5.2 g/dL Final   01/03/2025 3.3 (L) 3.5 - 5.2 g/dL Final   01/03/2025 3.3 (L) 3.5 - 5.2 g/dL Final   01/02/2025 3.4 (L) 3.5 - 5.2 g/dL Final   01/02/2025 3.5 3.5 - 5.2 g/dL Final   01/01/2025 3.5 3.5 - 5.2 g/dL Final      Magnesium Magnesium   Date Value Ref Range Status   01/03/2025 2.0 1.6 - 2.6 mg/dL Final   01/03/2025 2.0 1.6 - 2.6 mg/dL Final   01/02/2025 1.7 1.6 - 2.6 mg/dL Final   01/02/2025 1.8 1.6 - 2.6 mg/dL Final   01/02/2025 2.0 1.6 - 2.6 mg/dL Final   01/01/2025 1.5 (L) 1.6 - 2.6 mg/dL Final      Uric Acid Uric Acid   Date Value Ref Range Status   01/02/2025 7.6 (H) 2.4 - 5.7 mg/dL Final     Comment:     Falsely depressed results may occur on samples drawn from patients receiving N-Acetylcysteine (NAC) or Metamizole.          Results from last 7 days   Lab Units 01/04/25  0434 01/03/25  0433 01/03/25  0018 01/02/25 2021 01/02/25  1538 01/02/25  1314 01/02/25  0900 01/02/25  0453   SODIUM mmol/L 144 142  142  142 143 134*   < > 136 137 136   POTASSIUM mmol/L 4.3 4.2  4.2  " 4.2 4.4 4.7   < > 6.1* 6.1* 7.0*   CHLORIDE mmol/L 110* 105  105  105 105 98   < > 100 100 101   CO2 mmol/L 23.0 21.0*  24.0  24.0 27.0 22.0   < > 21.0* 22.6 22.3   BUN mg/dL 37* 29*  28*  28* 31* 34*   < > 39* 40* 41*   CREATININE mg/dL 1.99* 1.26*  1.29*  1.29* 1.25* 1.38*   < > 1.69* 2.39* 2.50*   CALCIUM mg/dL 8.0* 8.9  8.8  8.8 9.0 8.7   < > 8.8 8.1* 8.4*   ALBUMIN g/dL 3.0* 3.3*  3.3*  --   --   --   --  3.4* 3.5   WBC 10*3/mm3 8.93 8.23  --   --   --  9.71 10.48  --    HEMOGLOBIN g/dL 6.7* 7.5*  --   --   --  8.5* 8.4*  --    PLATELETS 10*3/mm3 195 196  --   --   --  222 232  --     < > = values in this interval not displayed.         Medication Review: Yes    Assessment & Plan       Acute respiratory failure with hypercapnia    MARIAA (acute kidney injury)    Type 2 diabetes mellitus with hyperglycemia    Hyperkalemia    Sepsis    Respiratory acidosis with metabolic acidosis    Acute respiratory failure      Assessment & Plan:    1-MARIAA- non oliguric - Pre-renal azotemia vs sepsis related MARIAA. UA - RBC 3-5, + protein. 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 At home on lisinopril, metformin and topiramate. Initially was improving with IV hydration and hemodynamic support but now worsening and UOP has decreased.   2- Hyperkalemia - improving - with lokelma and bicarb infusion  3- Hyperglycemia   4- Anemia   5- Shock   6- hx of Factor V Leiden deficiency   7- hx of DM   8- Respiratory distress - suspicion of aspiration pneumonia   9- Hx of Gout      Plan:  - Albumin infusion with diuresis. If no improvement in  UOP and worsening renal function - will need RRT  - Avoid nephrotoxic agents.   - Renal diet   - Adjust meds per renal function   - No emergent need of RRT   - Monitor H/H and transfuse for Hgb less than 7.0       Janessa Esparza MD  01/04/25  09:23 EST

## 2025-01-04 NOTE — PROGRESS NOTES
INTENSIVIST   PROGRESS NOTE     Hospital:  LOS: 2 days     Ms. Natalia Arzate, 39 y.o. female is followed for a Chief Complaint of: Respiratory Failure      Subjective   S     Interval History:  Hypoxia noted this AM despite an FiO2 of 100%. Chest xray obtained and showed white out of the left lung. Bag lavage was performed with suctioning with some improvement. Bronchoscopy performed with mucopurulent secretions noted on the left.     Renal function continues to worsen with poor urine output overnight.        The patient's relevant past medical, surgical and social history were reviewed and updated in Epic as appropriate.      ROS: Unable to obtain secondary to sedation and mechanical ventilation.     Objective   O     Vitals:  Temp  Min: 98.8 °F (37.1 °C)  Max: 101.8 °F (38.8 °C)  BP  Min: 93/40  Max: 144/77  Pulse  Min: 92  Max: 105  Resp  Min: 22  Max: 31  SpO2  Min: 82 %  Max: 98 % No data recorded    Intake/Ouptut 24 hrs (7:00AM - 6:59 AM)  Intake & Output (last 3 days)         01/01 0701 01/02 0700 01/02 0701 01/03 0700 01/03 0701 01/04 0700 01/04 0701 01/05 0700    I.V. (mL/kg)  2294.1 3896.1 2259.4 (14.2)    Other  100 176     NG/GT   185     IV Piggyback  470.8  76    Total Intake(mL/kg)  2864.9 4257.1 2335.4 (14.7)    Urine (mL/kg/hr)  3500 360 160 (0.4)    Total Output  3500 360 160    Net  -635.1 +3897.1 +2175.4                    Medications (drips):  fentanyl 10 mcg/mL, Last Rate: 250 mcg/hr (01/04/25 0614)  insulin, Last Rate: 20.1 Units/hr (01/04/25 0853)  Midazolam 1 mg/mL 100mL NS, Last Rate: 10 mg/hr (01/04/25 0854)  norepinephrine, Last Rate: 0.06 mcg/kg/min (01/04/25 0801)  Pharmacy to Dose Heparin  Pharmacy to dose vancomycin        Mechanical Ventilator Settings:          Resp Rate (Set): 24     FiO2 (%): 100 %  PEEP/CPAP (cm H2O): 16 cm H20    Minute Ventilation (L/min) (Obs): 8.69 L/min  Resp Rate (Observed) Vent: 24  I:E Ratio (Set): 1:1.27  I:E Ratio (Obs): 1:1.3    PIP Observed (cm  H2O): 35 cm H2O  Plateau Pressure (cm H2O): 32 cm H2O    Physical Examination  Telemetry:  Normal sinus rhythm.    Constitutional:  No acute distress. Morbidly obese.  ETT in place on mechanical ventilation.    Eyes: No scleral icterus.   PERRL, EOM intact.    Neck:  Supple, FROM   Cardiovascular: Normal rate, regular and rhythm. Normal heart sounds.  No murmurs, gallop or rub.   Respiratory: No respiratory distress. Normal respiratory effort.  Diminished to absent on the left.    Abdominal:  Soft. No masses. Nontender. No distension. No HSM.   Extremities: No digital cyanosis. No clubbing.  1+ peripheral edema.   Skin: No rashes, lesions or ulcers   Neurological:   Sedated.              Results from last 7 days   Lab Units 01/04/25  0434 01/03/25  0433 01/02/25  1314   WBC 10*3/mm3 8.93 8.23 9.71   HEMOGLOBIN g/dL 6.7* 7.5* 8.5*   MCV fL 99.6* 96.9 95.6   PLATELETS 10*3/mm3 195 196 222     Results from last 7 days   Lab Units 01/04/25  0434 01/03/25  0433 01/03/25  0018 01/02/25  1538 01/02/25  1314 01/02/25  0900   SODIUM mmol/L 144 142  142  142 143   < > 136 137   POTASSIUM mmol/L 4.3 4.2  4.2  4.2 4.4   < > 6.1* 6.1*   CO2 mmol/L 23.0 21.0*  24.0  24.0 27.0   < > 21.0* 22.6   CREATININE mg/dL 1.99* 1.26*  1.29*  1.29* 1.25*   < > 1.69* 2.39*   GLUCOSE mg/dL 107* 142*  147*  147* 168*   < > 377* 315*   MAGNESIUM mg/dL  --  2.0  2.0  --   --  1.7 1.8   PHOSPHORUS mg/dL  --  2.8  2.7  --   --  4.5 5.3*    < > = values in this interval not displayed.     Estimated Creatinine Clearance: 58.3 mL/min (A) (by C-G formula based on SCr of 1.99 mg/dL (H)).  Results from last 7 days   Lab Units 01/04/25  0434 01/03/25  0433 01/02/25  0900   ALK PHOS U/L 155* 139*  145* 137*   BILIRUBIN mg/dL 0.3 0.2  0.3 0.3   ALT (SGPT) U/L 34* 54*  54* 71*   AST (SGOT) U/L 38* 63*  63* 119*       Results from last 7 days   Lab Units 01/04/25  0321 01/03/25  1921 01/03/25  0425 01/02/25  2021 01/02/25  1514   PH, ARTERIAL  pH units 7.285* 7.312* 7.291* 7.302* 7.221*   PCO2, ARTERIAL mm Hg 50.1* 49.8* 55.6* 52.6* 59.8*   PO2 ART mm Hg 62.2* 118.0* 69.1* 92.5 98.1   FIO2 % 90 100 60 65 75       Images:  Imaging Results (Last 24 Hours)       Procedure Component Value Units Date/Time    XR Chest 1 View [[id number]] Collected: 01/04/25 0854     Updated: 01/04/25 0900    Narrative:      XR CHEST 1 VW    Date of Exam: 1/4/2025 12:36 AM EST    Indication: Intubated Patient    Comparison: Chest 1/3/2025    Findings:  The heart is prominent yet stable in size. The distal tip of an endotracheal tube is below the thoracic inlet and above the level of the anna. There is a right jugular central venous catheter with the distal tip overlying the superior vena cava. The   distal tip of an enteric tube is collimated. There is obscuration of the left hemidiaphragm compatible with left lower lobe airspace disease and underlying pleural effusion. No evidence for pneumothorax. There is mild right basilar atelectasis.      Impression:      Impression:  Persistent left lower lobe airspace disease and underlying pleural effusion.      Electronically Signed: Lynn Hernandez MD    1/4/2025 8:57 AM EST    Workstation ID: [id number]    XR Chest 1 View [[id number]] Resulted: 01/04/25 0800     Updated: 01/04/25 0815    XR Chest 1 View [[id number]] Collected: 01/03/25 1951     Updated: 01/03/25 1955    Narrative:      XR CHEST 1 VW    Date of Exam: 1/3/2025 7:08 PM EST    Indication: decreasing saturation    Comparison: 1/3/2025    Findings:  ET tube is above the anna. Esophagogastric tube is below the diaphragm. Right IJ central venous catheter tip overlies the upper SVC. The patient's rotated to the left. The right lung is clear. Stable cardiomegaly. Persistent left basilar airspace   disease and left pleural effusion.      Impression:      Impression:  1. Stable lines and support tubes.  2. Persistent left basilar airspace disease and left pleural effusion which  may relate to atelectasis and/or pneumonia.  3. Clear right lung.          Electronically Signed: Moy Benavidez MD    1/3/2025 7:52 PM EST    Workstation ID: [id number]    XR Abdomen KUB [[id number]] Collected: 01/03/25 1147     Updated: 01/03/25 1201    Narrative:      XR ABDOMEN KUB    Date of Exam: 1/3/2025 11:19 AM EST    Indication: keofeed placement    Comparison:  1/2/2025    Findings:  Limited examination due to incomplete visualization of the abdomen. Nasogastric tube is again seen. Left basal opacity with small to moderate left pleural effusion. Weighted feeding tube appears to terminate in the gastric antrum. Nonobstructive bowel   gas pattern. No displaced osseous normality.      Impression:      Impression:  1.Weighted feeding tube appears to terminate in the gastric antrum. Recommend advancement if postpyloric position is desired.  2.Left basal opacity with small to moderate left pleural effusion.        Electronically Signed: Jacob Alvarado MD    1/3/2025 11:58 AM EST    Workstation ID: [id number]               Results: Reviewed.  I reviewed the patient's new laboratory and imaging results.  I independently reviewed the patient's new images.    Medications: Reviewed.    Assessment & Plan   A / P     Ms. Arzate is a 40yo F with a history of HLD, Hypothyroidism, Afib, PE/DVT, Factor V Leiden mutation, and stroke who presented to [facility] with altered mental status. Labs there were significant for renal failure, hyperglycemia, and hypercapnic respiratory failure. Imaging consistent with basilar pulmonary infiltrates concerning for pneumonia. She was intubated and required the initiation of vasopressors. She was transferred to [facility] on 1/2/25 for ongoing care.     Since arrival, she has continued to require mechanical ventilation along with vasopressors. She has been started on Vancomycin, Flagyl and Cefepime. Cultures are significant for MRSA in the sputum along with gemella sanguinis in the blood.     Nephrology  has been following for MARIAA with poor urine output and hyperkalemia. Renal function continues to worsen with <400cc of urine in the past 24 hours.     This AM, she developed worsening hypoxia despite an FiO2 of 100% and PEEP of 16. Imaging showed white out of the left lung. Bronchoscopy was performed with mucopurulent secretions suctioned from the left upper and left lower lobes. Repeat CXR after bronchoscopy showed some improvement in left upper lobe aeration with persistent left lower lobe disease vs pleural effusion.     Nutrition:   NPO Diet NPO Type: Tube Feeding  Advance Directives:   Code Status and Medical Interventions: CPR (Attempt to Resuscitate); Full Support   Ordered at: 01/02/25 1952     Code Status (Patient has no pulse and is not breathing):    CPR (Attempt to Resuscitate)     Medical Interventions (Patient has pulse or is breathing):    Full Support       Active Hospital Problems    Diagnosis     **Acute respiratory failure with hypercapnia     Hyperkalemia     Sepsis     Respiratory acidosis with metabolic acidosis     Acute respiratory failure     Type 2 diabetes mellitus with hyperglycemia     MARIAA (acute kidney injury)        Assessment / Plan:    Continue mechanical ventilation. The vent has been adjusted to increase FiO2 and PEEP.   Sedation with Versed and Fentanyl. Not appropriate for weaning. May need paralytics if oxygenation worsens.   Continue Vancomycin, Cefepime and Flagyl. F/u cultures.  Continue insulin infusion.   Titrate vasopressors.   Heparin drip currently on hold secondary to anemia for history of Factor V Leiden with previous PE.   Transfuse 2 units of pRBCs. Continue to monitor anemia.   Nephrology following for management of MARIAA.   Check an Echocardiogram. If signs of pulmonary hypertension and O2 continues to worsen, may benefit from a trial of nitric oxide.   AM labs and CXR    Remains critically ill secondary to shock with multi-organ failure.     High level of risk due  to:  severe exacerbation of chronic illness and illness with threat to life or bodily function.    Plan of care and goals reviewed during interdisciplinary rounds.  I discussed the patient's findings and my recommendations with nursing staff    Critical Care Time: was greater than 40 minutes.(This excludes time spent performing separately reportable procedures and services). including high complexity decision making to assess, manipulate, and support vital organ system failure in this individual who has impairment of one or more vital organ systems such that there is a high probability of imminent or life threatening deterioration in the patient’s condition.      Tiffaine Case, DO    Intensive Care Medicine and Pulmonary Medicine

## 2025-01-04 NOTE — PROCEDURES
"Bronchoscopy    Date/Time: 1/4/2025 9:48 AM    Performed by: Tiffanie You DO  Authorized by: Tiffanie You DO  Consent: The procedure was performed in an emergent situation.  Patient identity confirmed: anonymous protocol, patient vented/unresponsive  Time out: Immediately prior to procedure a \"time out\" was called to verify the correct patient, procedure, equipment, support staff and site/side marked as required.  Patient tolerance: patient tolerated the procedure well with no immediate complications  Comments: The bronchoscope was inserted into the ET tube and advanced to the main anna.     The bronchoscope was then advanced down the right mainstem and the right upper lobe, right middle lobe and right lower lobe were examined with scant secretions noted.     The bronchoscope was then retracted and advanced down the left mainstem. Copious mucopurulent secretions were noted in the left upper and left lower lobes. Saline irrigation was performed with suctioning of the secretions for airway clearance.     A sample was obtained for respiratory culture. The scope was then withdrawn.         "

## 2025-01-04 NOTE — PLAN OF CARE
Goal Outcome Evaluation:  Plan of Care Reviewed With: patient, child        Progress: no change    -Sats 79-85% this am on 100% FiO2 and peep 16, vec 10 mg x 1, emergent bronch done, sats improved  - Bumex x 1, UOP improved, additional Bumex x 1 administered, total UOP 1.8 L for the shift, monitor and no CRRT for now per  Isaias  -T max 100.6, prn tylenol administered  -TF stopped this am, NGT output 950 ml, bile  -heparin gtt stopped, 1 unit of PRBCs transfused, recheck H/H 8.2/27.5  - insulin drip continues  - ABX adjusted, BC grew gemella sanguinis, ampicillin grade dose challenge done, tolerated, continue vanc  - FiO2 down to 75 % this pm    -Son updated

## 2025-01-05 ENCOUNTER — APPOINTMENT (OUTPATIENT)
Dept: GENERAL RADIOLOGY | Facility: HOSPITAL | Age: 40
DRG: 870 | End: 2025-01-05
Payer: COMMERCIAL

## 2025-01-05 LAB
ALBUMIN SERPL-MCNC: 3.2 G/DL (ref 3.5–5.2)
ALBUMIN/GLOB SERPL: 0.8 G/DL
ALP SERPL-CCNC: 175 U/L (ref 39–117)
ALT SERPL W P-5'-P-CCNC: 24 U/L (ref 1–33)
ANION GAP SERPL CALCULATED.3IONS-SCNC: 15 MMOL/L (ref 5–15)
ARTERIAL PATENCY WRIST A: ABNORMAL
AST SERPL-CCNC: 25 U/L (ref 1–32)
ATMOSPHERIC PRESS: ABNORMAL MM[HG]
BASE EXCESS BLDA CALC-SCNC: -4.1 MMOL/L (ref 0–2)
BDY SITE: ABNORMAL
BH BB BLOOD EXPIRATION DATE: NORMAL
BH BB BLOOD TYPE BARCODE: 600
BH BB DISPENSE STATUS: NORMAL
BH BB PRODUCT CODE: NORMAL
BH BB UNIT NUMBER: NORMAL
BILIRUB SERPL-MCNC: 0.3 MG/DL (ref 0–1.2)
BODY TEMPERATURE: 37
BUN SERPL-MCNC: 42 MG/DL (ref 6–20)
BUN/CREAT SERPL: 18.9 (ref 7–25)
CALCIUM SPEC-SCNC: 8 MG/DL (ref 8.6–10.5)
CHLORIDE SERPL-SCNC: 106 MMOL/L (ref 98–107)
CO2 BLDA-SCNC: 24.1 MMOL/L (ref 22–33)
CO2 SERPL-SCNC: 22 MMOL/L (ref 22–29)
COHGB MFR BLD: 1.6 % (ref 0–2)
CREAT SERPL-MCNC: 2.22 MG/DL (ref 0.57–1)
CROSSMATCH INTERPRETATION: NORMAL
DEPRECATED RDW RBC AUTO: 66.2 FL (ref 37–54)
EGFRCR SERPLBLD CKD-EPI 2021: 28.5 ML/MIN/1.73
EPAP: 0
ERYTHROCYTE [DISTWIDTH] IN BLOOD BY AUTOMATED COUNT: 18.7 % (ref 12.3–15.4)
GLOBULIN UR ELPH-MCNC: 3.9 GM/DL
GLUCOSE BLDC GLUCOMTR-MCNC: 103 MG/DL (ref 70–130)
GLUCOSE BLDC GLUCOMTR-MCNC: 104 MG/DL (ref 70–130)
GLUCOSE BLDC GLUCOMTR-MCNC: 105 MG/DL (ref 70–130)
GLUCOSE BLDC GLUCOMTR-MCNC: 106 MG/DL (ref 70–130)
GLUCOSE BLDC GLUCOMTR-MCNC: 110 MG/DL (ref 70–130)
GLUCOSE BLDC GLUCOMTR-MCNC: 111 MG/DL (ref 70–130)
GLUCOSE BLDC GLUCOMTR-MCNC: 118 MG/DL (ref 70–130)
GLUCOSE BLDC GLUCOMTR-MCNC: 119 MG/DL (ref 70–130)
GLUCOSE BLDC GLUCOMTR-MCNC: 120 MG/DL (ref 70–130)
GLUCOSE BLDC GLUCOMTR-MCNC: 121 MG/DL (ref 70–130)
GLUCOSE BLDC GLUCOMTR-MCNC: 99 MG/DL (ref 70–130)
GLUCOSE SERPL-MCNC: 107 MG/DL (ref 65–99)
HCO3 BLDA-SCNC: 22.6 MMOL/L (ref 20–26)
HCT VFR BLD AUTO: 27.1 % (ref 34–46.6)
HCT VFR BLD CALC: 25.3 % (ref 38–51)
HGB BLD-MCNC: 8 G/DL (ref 12–15.9)
HGB BLDA-MCNC: 8.3 G/DL (ref 14–18)
INHALED O2 CONCENTRATION: 75 %
IPAP: 0
MCH RBC QN AUTO: 29 PG (ref 26.6–33)
MCHC RBC AUTO-ENTMCNC: 29.5 G/DL (ref 31.5–35.7)
MCV RBC AUTO: 98.2 FL (ref 79–97)
METHGB BLD QL: 0.5 % (ref 0–1.5)
MODALITY: ABNORMAL
OXYHGB MFR BLDV: 97.6 % (ref 94–99)
PAW @ PEAK INSP FLOW SETTING VENT: 0 CMH2O
PCO2 BLDA: 48.6 MM HG (ref 35–45)
PCO2 TEMP ADJ BLD: 48.6 MM HG (ref 35–45)
PEEP RESPIRATORY: 16 CM[H2O]
PH BLDA: 7.28 PH UNITS (ref 7.35–7.45)
PH, TEMP CORRECTED: 7.28 PH UNITS
PLATELET # BLD AUTO: 197 10*3/MM3 (ref 140–450)
PMV BLD AUTO: 9.6 FL (ref 6–12)
PO2 BLDA: 123 MM HG (ref 83–108)
PO2 TEMP ADJ BLD: 123 MM HG (ref 83–108)
POTASSIUM SERPL-SCNC: 3.9 MMOL/L (ref 3.5–5.2)
PROT SERPL-MCNC: 7.1 G/DL (ref 6–8.5)
RBC # BLD AUTO: 2.76 10*6/MM3 (ref 3.77–5.28)
SODIUM SERPL-SCNC: 143 MMOL/L (ref 136–145)
TOTAL RATE: 24 BREATHS/MINUTE
UNIT  ABO: NORMAL
UNIT  RH: NORMAL
VANCOMYCIN SERPL-MCNC: 18.9 MCG/ML (ref 5–40)
VENTILATOR MODE: ABNORMAL
VT ON VENT VENT: 0.38 ML
WBC NRBC COR # BLD AUTO: 9.82 10*3/MM3 (ref 3.4–10.8)

## 2025-01-05 PROCEDURE — 71045 X-RAY EXAM CHEST 1 VIEW: CPT

## 2025-01-05 PROCEDURE — 25010000002 FENTANYL CITRATE (PF) 2500 MCG/50ML SOLUTION: Performed by: NURSE PRACTITIONER

## 2025-01-05 PROCEDURE — 82805 BLOOD GASES W/O2 SATURATION: CPT

## 2025-01-05 PROCEDURE — 25810000003 SODIUM CHLORIDE 0.9 % SOLUTION: Performed by: NURSE PRACTITIONER

## 2025-01-05 PROCEDURE — 87154 CUL TYP ID BLD PTHGN 6+ TRGT: CPT | Performed by: INTERNAL MEDICINE

## 2025-01-05 PROCEDURE — 25010000002 VANCOMYCIN HCL 1.25 G RECONSTITUTED SOLUTION 1 EACH VIAL

## 2025-01-05 PROCEDURE — 25010000002 AMPICILLIN PER 500 MG: Performed by: INTERNAL MEDICINE

## 2025-01-05 PROCEDURE — P9047 ALBUMIN (HUMAN), 25%, 50ML: HCPCS | Performed by: INTERNAL MEDICINE

## 2025-01-05 PROCEDURE — 99291 CRITICAL CARE FIRST HOUR: CPT | Performed by: INTERNAL MEDICINE

## 2025-01-05 PROCEDURE — 25010000002 BUMETANIDE PER 0.5 MG: Performed by: NURSE PRACTITIONER

## 2025-01-05 PROCEDURE — 25010000002 METRONIDAZOLE 500 MG/100ML SOLUTION: Performed by: INTERNAL MEDICINE

## 2025-01-05 PROCEDURE — 85027 COMPLETE CBC AUTOMATED: CPT | Performed by: INTERNAL MEDICINE

## 2025-01-05 PROCEDURE — 80202 ASSAY OF VANCOMYCIN: CPT

## 2025-01-05 PROCEDURE — 87147 CULTURE TYPE IMMUNOLOGIC: CPT | Performed by: INTERNAL MEDICINE

## 2025-01-05 PROCEDURE — 82948 REAGENT STRIP/BLOOD GLUCOSE: CPT

## 2025-01-05 PROCEDURE — 83050 HGB METHEMOGLOBIN QUAN: CPT

## 2025-01-05 PROCEDURE — 25010000002 AMPICILLIN PER 500 MG

## 2025-01-05 PROCEDURE — 25810000003 SODIUM CHLORIDE 0.9 % SOLUTION 250 ML FLEX CONT

## 2025-01-05 PROCEDURE — 94799 UNLISTED PULMONARY SVC/PX: CPT

## 2025-01-05 PROCEDURE — 25010000002 FENTANYL 10 MCG/1 ML NS: Performed by: NURSE PRACTITIONER

## 2025-01-05 PROCEDURE — 80053 COMPREHEN METABOLIC PANEL: CPT | Performed by: INTERNAL MEDICINE

## 2025-01-05 PROCEDURE — 94003 VENT MGMT INPAT SUBQ DAY: CPT

## 2025-01-05 PROCEDURE — 25010000002 ALBUMIN HUMAN 25% PER 50 ML: Performed by: INTERNAL MEDICINE

## 2025-01-05 PROCEDURE — 87040 BLOOD CULTURE FOR BACTERIA: CPT | Performed by: INTERNAL MEDICINE

## 2025-01-05 PROCEDURE — 25010000002 MIDAZOLAM 1 MG/ML 100ML NS 100 MG/100ML SOLUTION: Performed by: NURSE PRACTITIONER

## 2025-01-05 PROCEDURE — 25010000002 BUMETANIDE PER 0.5 MG: Performed by: INTERNAL MEDICINE

## 2025-01-05 PROCEDURE — 82375 ASSAY CARBOXYHB QUANT: CPT

## 2025-01-05 RX ORDER — ALBUMIN (HUMAN) 12.5 G/50ML
25 SOLUTION INTRAVENOUS EVERY 12 HOURS
Status: COMPLETED | OUTPATIENT
Start: 2025-01-05 | End: 2025-01-05

## 2025-01-05 RX ORDER — BUMETANIDE 0.25 MG/ML
1 INJECTION, SOLUTION INTRAMUSCULAR; INTRAVENOUS ONCE
Status: COMPLETED | OUTPATIENT
Start: 2025-01-05 | End: 2025-01-05

## 2025-01-05 RX ORDER — BUMETANIDE 0.25 MG/ML
1 INJECTION, SOLUTION INTRAMUSCULAR; INTRAVENOUS EVERY 8 HOURS
Status: COMPLETED | OUTPATIENT
Start: 2025-01-05 | End: 2025-01-06

## 2025-01-05 RX ORDER — ACETAMINOPHEN 160 MG/5ML
650 SOLUTION ORAL EVERY 8 HOURS PRN
Status: DISCONTINUED | OUTPATIENT
Start: 2025-01-05 | End: 2025-01-07

## 2025-01-05 RX ORDER — METOLAZONE 5 MG/1
20 TABLET ORAL ONCE
Status: COMPLETED | OUTPATIENT
Start: 2025-01-05 | End: 2025-01-05

## 2025-01-05 RX ADMIN — Medication: at 20:58

## 2025-01-05 RX ADMIN — AMPICILLIN SODIUM 2 G: 2 INJECTION, POWDER, FOR SOLUTION INTRAMUSCULAR; INTRAVENOUS at 01:45

## 2025-01-05 RX ADMIN — AMPICILLIN SODIUM 2 G: 2 INJECTION, POWDER, FOR SOLUTION INTRAMUSCULAR; INTRAVENOUS at 20:58

## 2025-01-05 RX ADMIN — MIDAZOLAM HYDROCHLORIDE 10 MG/HR: 1 INJECTION, SOLUTION INTRAVENOUS at 05:40

## 2025-01-05 RX ADMIN — POLYETHYLENE GLYCOL 3350 17 G: 17 POWDER, FOR SOLUTION ORAL at 09:50

## 2025-01-05 RX ADMIN — AMPICILLIN SODIUM 2 G: 2 INJECTION, POWDER, FOR SOLUTION INTRAMUSCULAR; INTRAVENOUS at 15:52

## 2025-01-05 RX ADMIN — BUMETANIDE 1 MG: 0.25 INJECTION INTRAMUSCULAR; INTRAVENOUS at 12:29

## 2025-01-05 RX ADMIN — INSULIN HUMAN 14.6 UNITS/HR: 1 INJECTION, SOLUTION INTRAVENOUS at 13:17

## 2025-01-05 RX ADMIN — AMPICILLIN SODIUM 2 G: 2 INJECTION, POWDER, FOR SOLUTION INTRAMUSCULAR; INTRAVENOUS at 05:23

## 2025-01-05 RX ADMIN — INSULIN HUMAN 13.5 UNITS/HR: 1 INJECTION, SOLUTION INTRAVENOUS at 22:06

## 2025-01-05 RX ADMIN — METRONIDAZOLE 500 MG: 500 INJECTION, SOLUTION INTRAVENOUS at 13:15

## 2025-01-05 RX ADMIN — CHLORHEXIDINE GLUCONATE 0.12% ORAL RINSE 15 ML: 1.2 LIQUID ORAL at 20:58

## 2025-01-05 RX ADMIN — BUMETANIDE 1 MG: 0.25 INJECTION INTRAMUSCULAR; INTRAVENOUS at 21:34

## 2025-01-05 RX ADMIN — NYSTATIN: 100000 POWDER TOPICAL at 21:24

## 2025-01-05 RX ADMIN — NYSTATIN: 100000 POWDER TOPICAL at 09:51

## 2025-01-05 RX ADMIN — MUPIROCIN 1 APPLICATION: 20 OINTMENT TOPICAL at 20:58

## 2025-01-05 RX ADMIN — Medication 300 MCG/HR: at 00:01

## 2025-01-05 RX ADMIN — INSULIN HUMAN 13.4 UNITS/HR: 1 INJECTION, SOLUTION INTRAVENOUS at 05:40

## 2025-01-05 RX ADMIN — AMPICILLIN SODIUM 2 G: 2 INJECTION, POWDER, FOR SOLUTION INTRAMUSCULAR; INTRAVENOUS at 09:44

## 2025-01-05 RX ADMIN — FAMOTIDINE 20 MG: 10 INJECTION, SOLUTION INTRAVENOUS at 09:49

## 2025-01-05 RX ADMIN — METOLAZONE 20 MG: 5 TABLET ORAL at 21:34

## 2025-01-05 RX ADMIN — ALBUMIN (HUMAN) 25 G: 0.25 INJECTION, SOLUTION INTRAVENOUS at 12:29

## 2025-01-05 RX ADMIN — FAMOTIDINE 20 MG: 10 INJECTION, SOLUTION INTRAVENOUS at 20:58

## 2025-01-05 RX ADMIN — CHLORHEXIDINE GLUCONATE 0.12% ORAL RINSE 15 ML: 1.2 LIQUID ORAL at 09:49

## 2025-01-05 RX ADMIN — MIDAZOLAM HYDROCHLORIDE 10 MG/HR: 1 INJECTION, SOLUTION INTRAVENOUS at 15:52

## 2025-01-05 RX ADMIN — METRONIDAZOLE 500 MG: 500 INJECTION, SOLUTION INTRAVENOUS at 21:24

## 2025-01-05 RX ADMIN — SENNOSIDES AND DOCUSATE SODIUM 2 TABLET: 50; 8.6 TABLET ORAL at 09:48

## 2025-01-05 RX ADMIN — ALBUMIN (HUMAN) 25 G: 0.25 INJECTION, SOLUTION INTRAVENOUS at 22:22

## 2025-01-05 RX ADMIN — MUPIROCIN 1 APPLICATION: 20 OINTMENT TOPICAL at 09:49

## 2025-01-05 RX ADMIN — Medication 300 MCG/HR: at 17:01

## 2025-01-05 RX ADMIN — NOREPINEPHRINE BITARTRATE 0.04 MCG/KG/MIN: 0.03 INJECTION, SOLUTION INTRAVENOUS at 13:15

## 2025-01-05 RX ADMIN — METRONIDAZOLE 500 MG: 500 INJECTION, SOLUTION INTRAVENOUS at 05:23

## 2025-01-05 RX ADMIN — VANCOMYCIN HYDROCHLORIDE 1250 MG: 1.25 INJECTION, POWDER, LYOPHILIZED, FOR SOLUTION INTRAVENOUS at 09:50

## 2025-01-05 RX ADMIN — ACETAMINOPHEN 650 MG: 650 SOLUTION ORAL at 15:52

## 2025-01-05 RX ADMIN — ACETAMINOPHEN 650 MG: 650 SOLUTION ORAL at 00:16

## 2025-01-05 RX ADMIN — SENNOSIDES AND DOCUSATE SODIUM 2 TABLET: 50; 8.6 TABLET ORAL at 20:59

## 2025-01-05 RX ADMIN — BUMETANIDE 1 MG: 0.25 INJECTION INTRAMUSCULAR; INTRAVENOUS at 19:04

## 2025-01-05 RX ADMIN — Medication 300 MCG/HR: at 09:51

## 2025-01-05 NOTE — PROGRESS NOTES
LOS: 3 days   Patient Care Team:  Bladimir Calle PA-C as PCP - General (Physician Assistant)    Chief Complaint: MARIAA   Hyperkalemia    Subjective     No acute events overnight. No new complaints       Subjective:  Symptoms:  No shortness of breath.        History taken from: family RN    Objective     Vital Sign Min/Max for last 24 hours  Temp  Min: 99.3 °F (37.4 °C)  Max: 103.1 °F (39.5 °C)   BP  Min: 94/43  Max: 143/63   Pulse  Min: 86  Max: 101   Resp  Min: 24  Max: 26   SpO2  Min: 90 %  Max: 99 %   No data recorded   Weight  Min: 159 kg (350 lb 8.5 oz)  Max: 159 kg (350 lb 11.2 oz)         No intake/output data recorded.  I/O last 3 completed shifts:  In: 5808 [I.V.:4616; Blood:360; Other:176; NG/GT:185; IV Piggyback:471]  Out: 4865 [Urine:2865; Emesis/NG output:2000]    Objective    Gen: intubated and sedated.    HENT: NC, AT  NECK: Supple, ETT in place  LUNGS: Coarse breath sound on vent, non labored respirtation   CVS: S1/S2 audible, RRR, no murmur   Abd: Soft, NT, ND, BS+   Ext: Trace edema, no cyanosis   CNS: Intubated and sedated.   Psy: Intubated and sedated.   Skin: Warm, dry and intact      Results Review:     I reviewed the patient's new clinical results.    WBC WBC   Date Value Ref Range Status   01/05/2025 9.82 3.40 - 10.80 10*3/mm3 Final   01/04/2025 8.93 3.40 - 10.80 10*3/mm3 Final   01/03/2025 8.23 3.40 - 10.80 10*3/mm3 Final   01/02/2025 9.71 3.40 - 10.80 10*3/mm3 Final   01/02/2025 10.48 3.40 - 10.80 10*3/mm3 Final      HGB Hemoglobin   Date Value Ref Range Status   01/05/2025 8.0 (L) 12.0 - 15.9 g/dL Final   01/04/2025 8.2 (L) 12.0 - 15.9 g/dL Final   01/04/2025 6.7 (C) 12.0 - 15.9 g/dL Final     Comment:     Confirmed/called   01/03/2025 7.5 (L) 12.0 - 15.9 g/dL Final   01/02/2025 8.5 (L) 12.0 - 15.9 g/dL Final   01/02/2025 8.4 (L) 12.0 - 15.9 g/dL Final      HCT Hematocrit   Date Value Ref Range Status   01/05/2025 27.1 (L) 34.0 - 46.6 % Final   01/04/2025 27.5 (L) 34.0 - 46.6 % Final  "  01/04/2025 23.0 (L) 34.0 - 46.6 % Final   01/03/2025 25.2 (L) 34.0 - 46.6 % Final   01/02/2025 28.0 (L) 34.0 - 46.6 % Final   01/02/2025 28.1 (L) 34.0 - 46.6 % Final      Platlets No results found for: \"LABPLAT\"   MCV MCV   Date Value Ref Range Status   01/05/2025 98.2 (H) 79.0 - 97.0 fL Final   01/04/2025 99.6 (H) 79.0 - 97.0 fL Final   01/03/2025 96.9 79.0 - 97.0 fL Final   01/02/2025 95.6 79.0 - 97.0 fL Final   01/02/2025 97.6 (H) 79.0 - 97.0 fL Final          Sodium Sodium   Date Value Ref Range Status   01/05/2025 143 136 - 145 mmol/L Final   01/04/2025 144 136 - 145 mmol/L Final   01/03/2025 142 136 - 145 mmol/L Final   01/03/2025 142 136 - 145 mmol/L Final   01/03/2025 142 136 - 145 mmol/L Final   01/03/2025 143 136 - 145 mmol/L Final   01/02/2025 134 (L) 136 - 145 mmol/L Final   01/02/2025 137 136 - 145 mmol/L Final   01/02/2025 136 136 - 145 mmol/L Final   01/02/2025 137 136 - 145 mmol/L Final      Potassium Potassium   Date Value Ref Range Status   01/05/2025 3.9 3.5 - 5.2 mmol/L Final   01/04/2025 4.3 3.5 - 5.2 mmol/L Final   01/03/2025 4.2 3.5 - 5.2 mmol/L Final   01/03/2025 4.2 3.5 - 5.2 mmol/L Final   01/03/2025 4.2 3.5 - 5.2 mmol/L Final   01/03/2025 4.4 3.5 - 5.2 mmol/L Final   01/02/2025 4.7 3.5 - 5.2 mmol/L Final   01/02/2025 5.7 (H) 3.5 - 5.2 mmol/L Final   01/02/2025 6.1 (C) 3.5 - 5.2 mmol/L Final   01/02/2025 6.1 (C) 3.5 - 5.2 mmol/L Final      Chloride Chloride   Date Value Ref Range Status   01/05/2025 106 98 - 107 mmol/L Final   01/04/2025 110 (H) 98 - 107 mmol/L Final   01/03/2025 105 98 - 107 mmol/L Final   01/03/2025 105 98 - 107 mmol/L Final   01/03/2025 105 98 - 107 mmol/L Final   01/03/2025 105 98 - 107 mmol/L Final   01/02/2025 98 98 - 107 mmol/L Final   01/02/2025 100 98 - 107 mmol/L Final   01/02/2025 100 98 - 107 mmol/L Final   01/02/2025 100 98 - 107 mmol/L Final      CO2 CO2   Date Value Ref Range Status   01/05/2025 22.0 22.0 - 29.0 mmol/L Final   01/04/2025 23.0 22.0 - 29.0 " "mmol/L Final   01/03/2025 24.0 22.0 - 29.0 mmol/L Final   01/03/2025 24.0 22.0 - 29.0 mmol/L Final   01/03/2025 21.0 (L) 22.0 - 29.0 mmol/L Final   01/03/2025 27.0 22.0 - 29.0 mmol/L Final   01/02/2025 22.0 22.0 - 29.0 mmol/L Final   01/02/2025 22.0 22.0 - 29.0 mmol/L Final   01/02/2025 21.0 (L) 22.0 - 29.0 mmol/L Final   01/02/2025 22.6 22.0 - 29.0 mmol/L Final      BUN BUN   Date Value Ref Range Status   01/05/2025 42 (H) 6 - 20 mg/dL Final   01/04/2025 37 (H) 6 - 20 mg/dL Final   01/03/2025 28 (H) 6 - 20 mg/dL Final   01/03/2025 28 (H) 6 - 20 mg/dL Final   01/03/2025 29 (H) 6 - 20 mg/dL Final   01/03/2025 31 (H) 6 - 20 mg/dL Final   01/02/2025 34 (H) 6 - 20 mg/dL Final   01/02/2025 37 (H) 6 - 20 mg/dL Final   01/02/2025 39 (H) 6 - 20 mg/dL Final   01/02/2025 40 (H) 6 - 20 mg/dL Final      Creatinine Creatinine   Date Value Ref Range Status   01/05/2025 2.22 (H) 0.57 - 1.00 mg/dL Final   01/04/2025 1.99 (H) 0.57 - 1.00 mg/dL Final   01/03/2025 1.29 (H) 0.57 - 1.00 mg/dL Final   01/03/2025 1.29 (H) 0.57 - 1.00 mg/dL Final   01/03/2025 1.26 (H) 0.57 - 1.00 mg/dL Final   01/03/2025 1.25 (H) 0.57 - 1.00 mg/dL Final   01/02/2025 1.38 (H) 0.57 - 1.00 mg/dL Final   01/02/2025 1.61 (H) 0.57 - 1.00 mg/dL Final   01/02/2025 1.69 (H) 0.57 - 1.00 mg/dL Final   01/02/2025 2.39 (H) 0.57 - 1.00 mg/dL Final      Calcium Calcium   Date Value Ref Range Status   01/05/2025 8.0 (L) 8.6 - 10.5 mg/dL Final   01/04/2025 8.0 (L) 8.6 - 10.5 mg/dL Final   01/03/2025 8.8 8.6 - 10.5 mg/dL Final   01/03/2025 8.8 8.6 - 10.5 mg/dL Final   01/03/2025 8.9 8.6 - 10.5 mg/dL Final   01/03/2025 9.0 8.6 - 10.5 mg/dL Final   01/02/2025 8.7 8.6 - 10.5 mg/dL Final   01/02/2025 8.7 8.6 - 10.5 mg/dL Final   01/02/2025 8.8 8.6 - 10.5 mg/dL Final   01/02/2025 8.1 (L) 8.6 - 10.5 mg/dL Final      PO4 No results found for: \"CAPO4\"   Albumin Albumin   Date Value Ref Range Status   01/05/2025 3.2 (L) 3.5 - 5.2 g/dL Final   01/04/2025 3.0 (L) 3.5 - 5.2 g/dL Final "   01/03/2025 3.3 (L) 3.5 - 5.2 g/dL Final   01/03/2025 3.3 (L) 3.5 - 5.2 g/dL Final   01/02/2025 3.4 (L) 3.5 - 5.2 g/dL Final      Magnesium Magnesium   Date Value Ref Range Status   01/03/2025 2.0 1.6 - 2.6 mg/dL Final   01/03/2025 2.0 1.6 - 2.6 mg/dL Final   01/02/2025 1.7 1.6 - 2.6 mg/dL Final   01/02/2025 1.8 1.6 - 2.6 mg/dL Final      Uric Acid Uric Acid   Date Value Ref Range Status   01/02/2025 7.6 (H) 2.4 - 5.7 mg/dL Final     Comment:     Falsely depressed results may occur on samples drawn from patients receiving N-Acetylcysteine (NAC) or Metamizole.          Results from last 7 days   Lab Units 01/05/25  0533 01/04/25  1703 01/04/25  0434 01/03/25  0433 01/03/25  0018 01/02/25  1538 01/02/25  1314 01/02/25  0900   SODIUM mmol/L 143  --  144 142  142  142 143   < > 136 137   POTASSIUM mmol/L 3.9  --  4.3 4.2  4.2  4.2 4.4   < > 6.1* 6.1*   CHLORIDE mmol/L 106  --  110* 105  105  105 105   < > 100 100   CO2 mmol/L 22.0  --  23.0 21.0*  24.0  24.0 27.0   < > 21.0* 22.6   BUN mg/dL 42*  --  37* 29*  28*  28* 31*   < > 39* 40*   CREATININE mg/dL 2.22*  --  1.99* 1.26*  1.29*  1.29* 1.25*   < > 1.69* 2.39*   CALCIUM mg/dL 8.0*  --  8.0* 8.9  8.8  8.8 9.0   < > 8.8 8.1*   ALBUMIN g/dL 3.2*  --  3.0* 3.3*  3.3*  --   --   --  3.4*   WBC 10*3/mm3 9.82  --  8.93 8.23  --   --  9.71 10.48   HEMOGLOBIN g/dL 8.0* 8.2* 6.7* 7.5*  --   --  8.5* 8.4*   PLATELETS 10*3/mm3 197  --  195 196  --   --  222 232    < > = values in this interval not displayed.       Intake/Output Summary (Last 24 hours) at 1/5/2025 0837  Last data filed at 1/5/2025 0400  Gross per 24 hour   Intake 2922.5 ml   Output 4745 ml   Net -1822.5 ml         Medication Review: Yes    Assessment & Plan       Acute respiratory failure with hypercapnia    MARIAA (acute kidney injury)    Type 2 diabetes mellitus with hyperglycemia    Hyperkalemia    Sepsis    Respiratory acidosis with metabolic acidosis    Acute respiratory failure      Assessment  & Plan:    1-MARIAA- non oliguric - Pre-renal azotemia vs sepsis related MARIAA. UA - RBC 3-5, + protein. 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 At home on lisinopril, metformin and topiramate. Initially was improving with IV hydration and hemodynamic support but now worsening and UOP has decreased.   2- Hyperkalemia - improving - with lokelma and bicarb infusion  3- Hyperglycemia   4- Anemia   5- Shock   6- hx of Factor V Leiden deficiency   7- hx of DM   8- Respiratory distress - suspicion of aspiration pneumonia   9- Hx of Gout      Plan:  - Albumin infusion with diuretics   - Avoid nephrotoxic agents.   - Renal diet   - Adjust meds per renal function   - No emergent need of RRT   - Monitor H/H and transfuse for Hgb less than 7.0     Discussed with RN and Critical care team  Janessa Esparza MD  01/05/25  08:36 EST

## 2025-01-05 NOTE — PLAN OF CARE
Goal Outcome Evaluation:                 Restraints continue  UOP decreased despite Bumex and albumin   400mL ~ UOP  Pt remains sedated   Trophic feeds started  One fever this afternoon 101.6F (resolved with PRN tylenol and ice packs)    Blood cultures redrawn  Sputum culture positive for MRSA

## 2025-01-05 NOTE — PLAN OF CARE
Goal Outcome Evaluation:  Plan of Care Reviewed With: patient        Progress: no change  Outcome Evaluation: Vent settings unchanged, FiO2 75% and peep of 16. No episodes of desaturation, now tolerating complete turns.  mls. Tmax 103.1, tylenol administered and ice packs applied. NG output 1L, bile. H&H stable. Family remains at bedside.

## 2025-01-05 NOTE — PROGRESS NOTES
INTENSIVIST   PROGRESS NOTE     Hospital:  LOS: 3 days     Ms. Natalia Arzate, 39 y.o. female is followed for a Chief Complaint of: Respiratory Failure      Subjective   S     Interval History:  Improved urine output with diuretics. FiO2 down to 60% this AM.        The patient's relevant past medical, surgical and social history were reviewed and updated in Epic as appropriate.      ROS: Unable to obtain secondary to sedation and mechanical ventilation.     Objective   O     Vitals:  Temp  Min: 98.8 °F (37.1 °C)  Max: 103.1 °F (39.5 °C)  BP  Min: 94/43  Max: 139/85  Pulse  Min: 86  Max: 101  Resp  Min: 24  Max: 26  SpO2  Min: 90 %  Max: 99 % No data recorded    Intake/Ouptut 24 hrs (7:00AM - 6:59 AM)  Intake & Output (last 3 days)         01/01 0701 01/02 0700 01/02 0701 01/03 0700 01/03 0701 01/04 0700 01/04 0701 01/05 0700    I.V. (mL/kg)  2294.1 3896.1 2259.4 (14.2)    Other  100 176     NG/GT   185     IV Piggyback  470.8  76    Total Intake(mL/kg)  2864.9 4257.1 2335.4 (14.7)    Urine (mL/kg/hr)  3500 360 160 (0.4)    Total Output  3500 360 160    Net  -635.1 +3897.1 +2175.4                    Medications (drips):  fentanyl 10 mcg/mL, Last Rate: 300 mcg/hr (01/05/25 0951)  insulin, Last Rate: 12.5 Units/hr (01/05/25 0839)  Midazolam 1 mg/mL 100mL NS, Last Rate: 10 mg/hr (01/05/25 0540)  norepinephrine, Last Rate: 0.04 mcg/kg/min (01/05/25 1001)  Pharmacy to Dose Heparin  Pharmacy to dose vancomycin        Mechanical Ventilator Settings:          Resp Rate (Set): 24     FiO2 (%): (S) 60 %  PEEP/CPAP (cm H2O): 16 cm H20    Minute Ventilation (L/min) (Obs): 8.65 L/min  Resp Rate (Observed) Vent: 24  I:E Ratio (Set): 1:2.42  I:E Ratio (Obs): 1:2.4    PIP Observed (cm H2O): 31 cm H2O  Plateau Pressure (cm H2O): 29 cm H2O    Physical Examination  Telemetry:  Normal sinus rhythm.    Constitutional:  No acute distress. Morbidly obese.  ETT in place on mechanical ventilation.    Eyes: No scleral icterus.   PERRL,  EOM intact.    Neck:  Supple, FROM   Cardiovascular: Normal rate, regular and rhythm. Normal heart sounds.  No murmurs, gallop or rub.   Respiratory: No respiratory distress. Normal respiratory effort.  Diminished. No wheezing.    Abdominal:  Soft. No masses. Nontender. No distension. No HSM.   Extremities: No digital cyanosis. No clubbing.  1+ peripheral edema.   Skin: No rashes, lesions or ulcers   Neurological:   Sedated.              Results from last 7 days   Lab Units 01/05/25 0533 01/04/25  1703 01/04/25 0434 01/03/25 0433   WBC 10*3/mm3 9.82  --  8.93 8.23   HEMOGLOBIN g/dL 8.0* 8.2* 6.7* 7.5*   MCV fL 98.2*  --  99.6* 96.9   PLATELETS 10*3/mm3 197  --  195 196     Results from last 7 days   Lab Units 01/05/25 0533 01/04/25  0434 01/03/25  0433 01/02/25  1538 01/02/25  1314 01/02/25  0900   SODIUM mmol/L 143 144 142  142  142   < > 136 137   POTASSIUM mmol/L 3.9 4.3 4.2  4.2  4.2   < > 6.1* 6.1*   CO2 mmol/L 22.0 23.0 21.0*  24.0  24.0   < > 21.0* 22.6   CREATININE mg/dL 2.22* 1.99* 1.26*  1.29*  1.29*   < > 1.69* 2.39*   GLUCOSE mg/dL 107* 107* 142*  147*  147*   < > 377* 315*   MAGNESIUM mg/dL  --   --  2.0  2.0  --  1.7 1.8   PHOSPHORUS mg/dL  --   --  2.8  2.7  --  4.5 5.3*    < > = values in this interval not displayed.     Estimated Creatinine Clearance: 52.3 mL/min (A) (by C-G formula based on SCr of 2.22 mg/dL (H)).  Results from last 7 days   Lab Units 01/05/25 0533 01/04/25 0434 01/03/25 0433   ALK PHOS U/L 175* 155* 139*  145*   BILIRUBIN mg/dL 0.3 0.3 0.2  0.3   ALT (SGPT) U/L 24 34* 54*  54*   AST (SGOT) U/L 25 38* 63*  63*       Results from last 7 days   Lab Units 01/05/25  0330 01/04/25  0321 01/03/25  1921 01/03/25  0425 01/02/25 2021   PH, ARTERIAL pH units 7.275* 7.285* 7.312* 7.291* 7.302*   PCO2, ARTERIAL mm Hg 48.6* 50.1* 49.8* 55.6* 52.6*   PO2 ART mm Hg 123.0* 62.2* 118.0* 69.1* 92.5   FIO2 % 75 90 100 60 65       Images:  Imaging Results (Last 24 Hours)        Procedure Component Value Units Date/Time    XR Chest 1 View [[id number]] Collected: 01/05/25 0827     Updated: 01/05/25 0831    Narrative:      XR CHEST 1 VW    Date of Exam: 1/5/2025 2:17 AM EST    Indication: Intubated Patient    Comparison: 1/4/2025    Findings:  The ET tube tip is 5.5 cm above the anna. There are 2 esophagogastric tubes with tips below the diaphragm. Right IJ central venous catheter tip overlies the upper SVC. The patient is rotated to the left. Stable cardiomegaly. Persistent moderate left   pleural effusion and left basilar airspace disease. No significant effusion on the right. No consolidation the right lung. No pneumothorax.      Impression:      Impression:  1. Stable lines and support tubes.  2. Persistent moderate left pleural effusion and left basilar airspace disease which may relate to atelectasis and/or pneumonia.  3. Stable cardiomegaly.  4. No pneumothorax.          Electronically Signed: Moy Benavidez MD    1/5/2025 8:27 AM EST    Workstation ID: [id number]    XR Chest 1 View [[id number]] Collected: 01/04/25 1438     Updated: 01/04/25 1501    Narrative:      XR CHEST 1 VW    Date of Exam: 1/4/2025 8:00 AM EST    Indication: post bronch    Comparison: Chest x-ray 1/4/2025    Findings:  The heart is prominent yet stable in size. The distal tip of an endotracheal tube appears unchanged at the tip at the thoracic inlet. There is a right jugular catheter with the distal tip overlying the superior vena cava. The distal tip of an enteric   tube is collimated. No identifiable pneumothorax. There is left lower lobe airspace disease and pleural effusion.      Impression:      Impression:  Persistent left lower lobe airspace disease and pleural effusion.      Electronically Signed: Lynn Hernandez MD    1/4/2025 2:41 PM EST    Workstation ID: [id number]               Results: Reviewed.  I reviewed the patient's new laboratory and imaging results.  I independently reviewed the patient's new  images.    Medications: Reviewed.    Assessment & Plan   A / P     Ms. Arzate is a 40yo F with a history of HLD, Hypothyroidism, Afib, PE/DVT, Factor V Leiden mutation, and stroke who presented to [facility] with altered mental status. Labs there were significant for renal failure, hyperglycemia, and hypercapnic respiratory failure. Imaging consistent with basilar pulmonary infiltrates concerning for pneumonia. She was intubated and required the initiation of vasopressors. She was transferred to [facility] on 1/2/25 for ongoing care.     Since arrival, she has continued to require mechanical ventilation along with vasopressors. She has been started on Vancomycin, Flagyl and Cefepime. Cultures are significant for MRSA in the sputum along with gemella sanguinis in the blood.     Nephrology has been following for MARIAA with poor urine output and hyperkalemia.     On the morning of 1/4/25, she developed worsening hypoxia despite an FiO2 of 100% and PEEP of 16. Imaging showed white out of the left lung. Bronchoscopy was performed with mucopurulent secretions suctioned from the left upper and left lower lobes. Repeat CXR after bronchoscopy showed some improvement in left upper lobe aeration with persistent left lower lobe disease vs pleural effusion.     This AM, she is febrile to 103. FiO2 is at 60%.     Nutrition:   NPO Diet NPO Type: Tube Feeding  Advance Directives:   Code Status and Medical Interventions: CPR (Attempt to Resuscitate); Full Support   Ordered at: 01/02/25 1952     Code Status (Patient has no pulse and is not breathing):    CPR (Attempt to Resuscitate)     Medical Interventions (Patient has pulse or is breathing):    Full Support       Active Hospital Problems    Diagnosis     **Acute respiratory failure with hypercapnia     Hyperkalemia     Sepsis     Respiratory acidosis with metabolic acidosis     Acute respiratory failure     Type 2 diabetes mellitus with hyperglycemia     MARIAA (acute kidney injury)         Assessment / Plan:    Continue mechanical ventilation. The vent has been adjusted to compensate for respiratory acidosis  Sedation with Versed and Fentanyl. Not appropriate for weaning.   Continue Vancomycin, Ampicillin and Flagyl. Repeat blood cultures today.   Continue insulin infusion.   Titrate vasopressors.   Heparin drip currently on hold secondary to anemia for history of Factor V Leiden with previous PE. If H/H stable, plan to restart heparin tomorrow.  Transfused 1 units of pRBCs on 1/4. Continue to monitor anemia.   Nephrology following for management of MARIAA. Diuretics again today.   Echocardiogram was unremarkable.   Bowel regimen.   Restart tube feeds at trophic rate.   AM labs and CXR    Remains critically ill secondary to shock with multi-organ failure.     High level of risk due to:  severe exacerbation of chronic illness and illness with threat to life or bodily function.    Plan of care and goals reviewed during interdisciplinary rounds.  I discussed the patient's findings and my recommendations with nursing staff    Critical Care Time: was greater than 33 minutes.(This excludes time spent performing separately reportable procedures and services). including high complexity decision making to assess, manipulate, and support vital organ system failure in this individual who has impairment of one or more vital organ systems such that there is a high probability of imminent or life threatening deterioration in the patient’s condition.      Tiffanie Case, DO    Intensive Care Medicine and Pulmonary Medicine

## 2025-01-05 NOTE — PROGRESS NOTES
"Pharmacy Consult - Vancomycin Dosing and Monitoring (Renal Dysfunction / Dialysis)    Natalia Arzate is a 39 y.o. female receiving vancomycin therapy.     Indication:  MRSA PNA  Consulting Provider:   Intensivist  ID Consult:  No    Goal Trough (Pulse Dosing):  15-20 mcg/mL    Current Antimicrobial Therapy  Anti-Infectives (From admission, onward)      Ordered     Dose/Rate Route Frequency Start Stop    01/05/25 0738  ampicillin 2000 mg IVPB in 100 mL NS (MBP)        Ordering Provider: Tiffanie You V., DO    2 g  200 mL/hr over 30 Minutes Intravenous Every 6 Hours 01/05/25 0900 01/11/25 0859    01/04/25 1511  ampicillin 2000 mg IVPB in 100 mL NS (MBP)  Status:  Discontinued        Ordering Provider: Bettie Walter APRN    2 g  200 mL/hr over 30 Minutes Intravenous Every 4 Hours 01/04/25 2100 01/05/25 0738    01/04/25 1500  ampicillin 1780 mg/100 mL (89% dose) IVPB for IV graded dose challenge        Ordering Provider: Bettie Walter APRN   Placed in \"Followed by\" Linked Group    1,780 mg  400 mL/hr over 15 Minutes Intravenous Once 01/04/25 1800 01/04/25 1818    01/04/25 1500  ampicillin 200 mg/100 mL (10% dose) IVPB for IV graded dose challenge        Ordering Provider: Bettie Walter APRN   Placed in \"Followed by\" Linked Group    200 mg  400 mL/hr over 15 Minutes Intravenous Once 01/04/25 1700 01/04/25 1712    01/04/25 1500  ampicillin 20 mg/100 mL (1% dose) IVPB for IV graded dose challenge        Ordering Provider: Bettie Walter APRN   Placed in \"Followed by\" Linked Group    20 mg  400 mL/hr over 15 Minutes Intravenous Once 01/04/25 1600 01/04/25 1617    01/04/25 0833  vancomycin (dosing per levels)        Ordering Provider: Rocky Brewster RPH     Not Applicable Daily 01/04/25 0930 01/08/25 0859    01/03/25 1114  cefepime 2000 mg IVPB in 100 mL NS (MBP)  Status:  Discontinued        Ordering Provider: David Lazaro, PharmD    2,000 mg  over 4 Hours Intravenous Every 8 Hours 01/03/25 1600 " 01/04/25 1500    01/02/25 1548  vancomycin (dosing per levels)  Status:  Discontinued        Ordering Provider: David Lazaro PharmD     Not Applicable Daily 01/03/25 0900 01/03/25 0738    01/03/25 0738  Vancomycin HCl 1,250 mg in sodium chloride 0.9 % 250 mL VTB  Status:  Discontinued        Ordering Provider: David Lazaro PharmD    1,250 mg  200 mL/hr over 75 Minutes Intravenous Every 12 Hours 01/03/25 0900 01/04/25 0833    01/02/25 1231  cefepime 2000 mg IVPB in 100 mL NS (MBP)  Status:  Discontinued        Ordering Provider: Bettie Walter APRN    2,000 mg  over 4 Hours Intravenous Every 12 Hours 01/02/25 2100 01/03/25 1114    01/02/25 1952  metroNIDAZOLE (FLAGYL) IVPB 500 mg        Ordering Provider: Roly Garcia MD    500 mg  200 mL/hr over 30 Minutes Intravenous Every 8 Hours 01/02/25 2100 01/09/25 2059    01/02/25 1547  vancomycin (VANCOCIN) 1,000 mg in sodium chloride 0.9 % 250 mL IVPB-VTB        Ordering Provider: David Lazaro PharmD    1,000 mg  250 mL/hr over 60 Minutes Intravenous Once 01/02/25 1645 01/02/25 1811    01/02/25 1511  Pharmacy to dose vancomycin        Ordering Provider: David Lazaro PharmD     Not Applicable Continuous PRN 01/02/25 1509 01/09/25 1508    01/02/25 1235  cefepime 2000 mg IVPB in 100 mL NS (MBP)        Ordering Provider: Bettie Walter APRN    2,000 mg  over 30 Minutes Intravenous Once 01/02/25 1330 01/02/25 1317    01/02/25 1228  cefTRIAXone (ROCEPHIN) 2,000 mg in sodium chloride 0.9 % 100 mL MBP  Status:  Discontinued        Ordering Provider: Bettie Walter APRN    2,000 mg  200 mL/hr over 30 Minutes Intravenous Every 24 Hours 01/02/25 1315 01/02/25 1230          Allergies  Allergies as of 01/02/2025 - Reviewed 01/02/2025   Allergen Reaction Noted    Salvia officinalis Itching, Other (See Comments), and Shortness Of Breath 03/02/2023    Shellfish allergy Anaphylaxis 12/30/2018    Toradol [ketorolac tromethamine] Anaphylaxis and Hives  "06/27/2016    Tree nut Anaphylaxis and Shortness Of Breath 03/16/2022    Haldol [haloperidol] Hives and Mental Status Change 03/04/2020    Tramadol Hives and Swelling 12/08/2018    Amoxicillin Hives and Rash 06/27/2016    Penicillins Hives and Rash 06/27/2016     Labs  Results from last 7 days   Lab Units 01/05/25  0533 01/04/25  0434 01/03/25  0433   BUN mg/dL 42* 37* 29*  28*  28*   CREATININE mg/dL 2.22* 1.99* 1.26*  1.29*  1.29*     Results from last 7 days   Lab Units 01/05/25  0533 01/04/25  0434 01/03/25  0433   WBC 10*3/mm3 9.82 8.93 8.23     Evaluation of Dosing     Last Dose Received in the ED/Outside Facility: 2500mg IV x 1 given 1/2 at ~2100  Is Patient on Dialysis or Renal Replacement: No; potential RRT    Height - 162.6 cm (64.02\")  Weight - (!) 159 kg (350 lb 11.2 oz)    Estimated Creatinine Clearance: 52.3 mL/min (A) (by C-G formula based on SCr of 2.22 mg/dL (H)).    Intake & Output (last 2 days)         01/03 0701 01/04 0700 01/04 0701 01/05 0700 01/05 0701 01/06 0700    I.V. (mL/kg) 3896.1 4190.9 (26.4)     Blood  360     Other 176      NG/      IV Piggyback  471     Total Intake(mL/kg) 4257.1 5021.9 (31.6)     Urine (mL/kg/hr) 360 2745 (0.7)     Emesis/NG output  2000     Total Output 360 4745     Net +3897.1 +276.9                  Microbiology  Microbiology Results (last 10 days)       Procedure Component Value - Date/Time    Respiratory Culture - Bronchial Wash, Lung, Left Lower Lobe [[id number]] Collected: 01/04/25 1825    Lab Status: Preliminary result Specimen: Bronchial Wash from Lung, Left Lower Lobe Updated: 01/04/25 0324     Gram Stain Moderate (3+) WBCs seen      Rare (1+) Gram positive cocci in pairs and clusters    Eosinophil Smear - Urine, Indwelling Urethral Catheter [[id number]]  (Normal) Collected: 01/02/25 1542    Lab Status: Final result Specimen: Urine from Indwelling Urethral Catheter Updated: 01/02/25 1700     Eosinophil Smear 0 % EOS/100 Cells     Narrative:      " No eosinophil seen    MRSA Screen, PCR (Inpatient) - Swab, Nares [[id number]]  (Abnormal) Collected: 01/02/25 1246    Lab Status: Final result Specimen: Swab from Nares Updated: 01/02/25 1504     MRSA PCR Positive    Narrative:      The negative predictive value of this diagnostic test is high and should only be used to consider de-escalating anti-MRSA therapy. A positive result may indicate colonization with MRSA and must be correlated clinically.    Urine Culture - Urine, Indwelling Urethral Catheter [[id number]]  (Normal) Collected: 01/02/25 1213    Lab Status: Final result Specimen: Urine from Indwelling Urethral Catheter Updated: 01/04/25 1251     Urine Culture No growth    S. Pneumo Ag Urine or CSF - Urine, Urine, Clean Catch [[id number]]  (Normal) Collected: 01/02/25 1213    Lab Status: Final result Specimen: Urine, Clean Catch Updated: 01/02/25 1913     Strep Pneumo Ag Negative    Legionella Antigen, Urine - Urine, Urine, Clean Catch [[id number]]  (Normal) Collected: 01/02/25 1213    Lab Status: Final result Specimen: Urine, Clean Catch Updated: 01/02/25 1913     LEGIONELLA ANTIGEN, URINE Negative    Respiratory Culture - Sputum, ET Suction [[id number]]  (Abnormal)  (Susceptibility) Collected: 01/02/25 1212    Lab Status: Final result Specimen: Sputum from ET Suction Updated: 01/04/25 0939     Respiratory Culture Moderate growth (3+) Staphylococcus aureus, MRSA      No Normal Respiratory Margo     Gram Stain Many (4+) WBCs per low power field      Rare (1+) Epithelial cells per low power field      Many (4+) Gram positive cocci in pairs and clusters    Susceptibility        Staphylococcus aureus, MRSA      KELSIE      Clindamycin 0.25 ug/ml Resistant      Linezolid 2 ug/ml Susceptible      Oxacillin >=4 ug/ml Resistant      Tetracycline <=1 ug/ml Susceptible      Trimethoprim + Sulfamethoxazole <=10 ug/ml Susceptible      Vancomycin <=0.5 ug/ml Susceptible                       Susceptibility Comments        Staphylococcus aureus, MRSA    This isolate is presumed to be clindamycin resistant based on detection of inducible clindamycin resistance.  Clindamycin may still be effective in some patients.               Blood Culture - Blood, Wrist, Right [[id number]]  (Abnormal) Collected: 01/01/25 2039    Lab Status: Final result Specimen: Blood from Wrist, Right Updated: 01/04/25 0707     Blood Culture Staphylococcus, coagulase negative     Isolated from Aerobic Bottle     Gram Stain Gram positive cocci in clusters    Narrative:      Probable contaminant requires clinical correlation, susceptibility not performed unless requested by physician.      Blood Culture - Blood, Wrist, Left [[id number]]  (Abnormal) Collected: 01/01/25 2039    Lab Status: Final result Specimen: Blood from Wrist, Left Updated: 01/04/25 0702     Blood Culture Gemella sanguinis     Comment: Currently there are no standardized antimicrobial testing guidelines for Gemella species. Literature search has this organism sensitive to penicillin and ampicillin.          Isolated from Anaerobic Bottle     Gram Stain Anaerobic Bottle Gram positive cocci in clusters    Blood Culture ID, PCR - Blood, Wrist, Left [[id number]] Collected: 01/01/25 2039    Lab Status: Final result Specimen: Blood from Wrist, Left Updated: 01/02/25 1604     BCID, PCR Negative by BCID PCR. Culture to Follow.     BOTTLE TYPE Anaerobic Bottle    Respiratory Panel PCR w/COVID-19(SARS-CoV-2) [facility] In-House, NP Swab in UTM/VTM, 2 HR TAT - Swab, Nasopharynx [[id number]]  (Normal) Collected: 01/01/25 2023    Lab Status: Final result Specimen: Swab from Nasopharynx Updated: 01/01/25 2201     ADENOVIRUS, PCR Not Detected     Coronavirus 229E Not Detected     Coronavirus HKU1 Not Detected     Coronavirus NL63 Not Detected     Coronavirus OC43 Not Detected     COVID19 Not Detected     Human Metapneumovirus Not Detected     Human Rhinovirus/Enterovirus Not Detected     Influenza A PCR  Not Detected     Influenza B PCR Not Detected     Parainfluenza Virus 1 Not Detected     Parainfluenza Virus 2 Not Detected     Parainfluenza Virus 3 Not Detected     Parainfluenza Virus 4 Not Detected     RSV, PCR Not Detected     Bordetella pertussis pcr Not Detected     Bordetella parapertussis PCR Not Detected     Chlamydophila pneumoniae PCR Not Detected     Mycoplasma pneumo by PCR Not Detected    Narrative:      In the setting of a positive respiratory panel with a viral infection PLUS a negative procalcitonin without other underlying concern for bacterial infection, consider observing off antibiotics or discontinuation of antibiotics and continue supportive care. If the respiratory panel is positive for atypical bacterial infection (Bordetella pertussis, Chlamydophila pneumoniae, or Mycoplasma pneumoniae), consider antibiotic de-escalation to target atypical bacterial infection.          Vancomycin Levels  Results from last 7 days   Lab Units 01/05/25  0533 01/04/25  0946 01/03/25  0433 01/02/25  1314   VANCOMYCIN RM mcg/mL 18.90 27.90 13.40 13.90                 Assessment/Plan:    PTD vancomycin 2/2 MRSA PNA.   Given evidence of MARIAA & solitary kidney, proceed w/ pulse dosing.  Vancomycin 1250 mg (~7.9 mg/kg) IV x 1 in AM on 1/5/25.  Obtain vancomycin random level w/ AM labs on 1/6/24 to assess clearance.  Reassess daily to determine appropriateness of pulse vs scheduled dosing.  Patient tolerated ampicillin graded-dose challenge. Dose adjusted 2/2 renal function. Remains on Flagyl.  Consider consolidating antimicrobial regimen to vancomycin/Unasyn.  Pharmacy will continue to follow & adjust dose based on renal function, drug levels, & clinical status.    Thank you,  Rocky Brewster RPH  1/5/2025

## 2025-01-06 ENCOUNTER — APPOINTMENT (OUTPATIENT)
Dept: GENERAL RADIOLOGY | Facility: HOSPITAL | Age: 40
DRG: 870 | End: 2025-01-06
Payer: COMMERCIAL

## 2025-01-06 LAB
ANION GAP SERPL CALCULATED.3IONS-SCNC: 20 MMOL/L (ref 5–15)
APTT PPP: 37.2 SECONDS (ref 60–90)
ARTERIAL PATENCY WRIST A: ABNORMAL
ARTERIAL PATENCY WRIST A: ABNORMAL
ATMOSPHERIC PRESS: ABNORMAL MM[HG]
ATMOSPHERIC PRESS: ABNORMAL MM[HG]
BACTERIA BLD CULT: ABNORMAL
BACTERIA SPEC RESP CULT: ABNORMAL
BACTERIA SPEC RESP CULT: ABNORMAL
BASE EXCESS BLDA CALC-SCNC: -6.3 MMOL/L (ref 0–2)
BASE EXCESS BLDA CALC-SCNC: -6.8 MMOL/L (ref 0–2)
BASOPHILS # BLD AUTO: 0.04 10*3/MM3 (ref 0–0.2)
BASOPHILS # BLD AUTO: 0.05 10*3/MM3 (ref 0–0.2)
BASOPHILS NFR BLD AUTO: 0.5 % (ref 0–1.5)
BASOPHILS NFR BLD AUTO: 0.6 % (ref 0–1.5)
BDY SITE: ABNORMAL
BDY SITE: ABNORMAL
BODY TEMPERATURE: 37
BODY TEMPERATURE: 37
BOTTLE TYPE: ABNORMAL
BUN SERPL-MCNC: 49 MG/DL (ref 6–20)
BUN/CREAT SERPL: 16.4 (ref 7–25)
CALCIUM SPEC-SCNC: 8.2 MG/DL (ref 8.6–10.5)
CHLORIDE SERPL-SCNC: 104 MMOL/L (ref 98–107)
CO2 BLDA-SCNC: 22.4 MMOL/L (ref 22–33)
CO2 BLDA-SCNC: 22.6 MMOL/L (ref 22–33)
CO2 SERPL-SCNC: 18 MMOL/L (ref 22–29)
COHGB MFR BLD: 1.7 % (ref 0–2)
COHGB MFR BLD: 1.8 % (ref 0–2)
CREAT SERPL-MCNC: 2.99 MG/DL (ref 0.57–1)
DEPRECATED RDW RBC AUTO: 68.2 FL (ref 37–54)
DEPRECATED RDW RBC AUTO: 68.7 FL (ref 37–54)
EGFRCR SERPLBLD CKD-EPI 2021: 19.9 ML/MIN/1.73
EOSINOPHIL # BLD AUTO: 0.22 10*3/MM3 (ref 0–0.4)
EOSINOPHIL # BLD AUTO: 0.24 10*3/MM3 (ref 0–0.4)
EOSINOPHIL NFR BLD AUTO: 2.4 % (ref 0.3–6.2)
EOSINOPHIL NFR BLD AUTO: 2.9 % (ref 0.3–6.2)
EPAP: 0
EPAP: 0
ERYTHROCYTE [DISTWIDTH] IN BLOOD BY AUTOMATED COUNT: 18.5 % (ref 12.3–15.4)
ERYTHROCYTE [DISTWIDTH] IN BLOOD BY AUTOMATED COUNT: 18.6 % (ref 12.3–15.4)
GLUCOSE BLDC GLUCOMTR-MCNC: 109 MG/DL (ref 70–130)
GLUCOSE BLDC GLUCOMTR-MCNC: 113 MG/DL (ref 70–130)
GLUCOSE BLDC GLUCOMTR-MCNC: 115 MG/DL (ref 70–130)
GLUCOSE BLDC GLUCOMTR-MCNC: 116 MG/DL (ref 70–130)
GLUCOSE BLDC GLUCOMTR-MCNC: 118 MG/DL (ref 70–130)
GLUCOSE BLDC GLUCOMTR-MCNC: 119 MG/DL (ref 70–130)
GLUCOSE BLDC GLUCOMTR-MCNC: 120 MG/DL (ref 70–130)
GLUCOSE BLDC GLUCOMTR-MCNC: 122 MG/DL (ref 70–130)
GLUCOSE BLDC GLUCOMTR-MCNC: 122 MG/DL (ref 70–130)
GLUCOSE BLDC GLUCOMTR-MCNC: 124 MG/DL (ref 70–130)
GLUCOSE BLDC GLUCOMTR-MCNC: 126 MG/DL (ref 70–130)
GLUCOSE BLDC GLUCOMTR-MCNC: 126 MG/DL (ref 70–130)
GLUCOSE BLDC GLUCOMTR-MCNC: 127 MG/DL (ref 70–130)
GLUCOSE SERPL-MCNC: 118 MG/DL (ref 65–99)
GRAM STN SPEC: ABNORMAL
GRAM STN SPEC: ABNORMAL
HCO3 BLDA-SCNC: 20.8 MMOL/L (ref 20–26)
HCO3 BLDA-SCNC: 21.1 MMOL/L (ref 20–26)
HCT VFR BLD AUTO: 23.8 % (ref 34–46.6)
HCT VFR BLD AUTO: 25.4 % (ref 34–46.6)
HCT VFR BLD AUTO: 26.1 % (ref 34–46.6)
HCT VFR BLD AUTO: 26.3 % (ref 34–46.6)
HCT VFR BLD CALC: 23.8 % (ref 38–51)
HCT VFR BLD CALC: 23.8 % (ref 38–51)
HGB BLD-MCNC: 7 G/DL (ref 12–15.9)
HGB BLD-MCNC: 7.5 G/DL (ref 12–15.9)
HGB BLD-MCNC: 7.5 G/DL (ref 12–15.9)
HGB BLD-MCNC: 7.6 G/DL (ref 12–15.9)
HGB BLDA-MCNC: 7.8 G/DL (ref 14–18)
HGB BLDA-MCNC: 7.8 G/DL (ref 14–18)
IMM GRANULOCYTES # BLD AUTO: 0.13 10*3/MM3 (ref 0–0.05)
IMM GRANULOCYTES # BLD AUTO: 0.14 10*3/MM3 (ref 0–0.05)
IMM GRANULOCYTES NFR BLD AUTO: 1.4 % (ref 0–0.5)
IMM GRANULOCYTES NFR BLD AUTO: 1.7 % (ref 0–0.5)
INHALED O2 CONCENTRATION: 50 %
INHALED O2 CONCENTRATION: 65 %
INR PPP: 1.24 (ref 0.89–1.12)
IPAP: 0
IPAP: 0
LYMPHOCYTES # BLD AUTO: 0.95 10*3/MM3 (ref 0.7–3.1)
LYMPHOCYTES # BLD AUTO: 1.34 10*3/MM3 (ref 0.7–3.1)
LYMPHOCYTES NFR BLD AUTO: 10.5 % (ref 19.6–45.3)
LYMPHOCYTES NFR BLD AUTO: 16.3 % (ref 19.6–45.3)
Lab: ABNORMAL
MAGNESIUM SERPL-MCNC: 1.8 MG/DL (ref 1.6–2.6)
MCH RBC QN AUTO: 29.1 PG (ref 26.6–33)
MCH RBC QN AUTO: 29.2 PG (ref 26.6–33)
MCHC RBC AUTO-ENTMCNC: 28.7 G/DL (ref 31.5–35.7)
MCHC RBC AUTO-ENTMCNC: 28.9 G/DL (ref 31.5–35.7)
MCV RBC AUTO: 101.2 FL (ref 79–97)
MCV RBC AUTO: 101.2 FL (ref 79–97)
METHGB BLD QL: 0.2 % (ref 0–1.5)
METHGB BLD QL: 0.5 % (ref 0–1.5)
MODALITY: ABNORMAL
MODALITY: ABNORMAL
MONOCYTES # BLD AUTO: 0.86 10*3/MM3 (ref 0.1–0.9)
MONOCYTES # BLD AUTO: 0.86 10*3/MM3 (ref 0.1–0.9)
MONOCYTES NFR BLD AUTO: 10.4 % (ref 5–12)
MONOCYTES NFR BLD AUTO: 9.5 % (ref 5–12)
NEUTROPHILS NFR BLD AUTO: 5.62 10*3/MM3 (ref 1.7–7)
NEUTROPHILS NFR BLD AUTO: 6.85 10*3/MM3 (ref 1.7–7)
NEUTROPHILS NFR BLD AUTO: 68.2 % (ref 42.7–76)
NEUTROPHILS NFR BLD AUTO: 75.6 % (ref 42.7–76)
NOTIFIED BY: ABNORMAL
NOTIFIED WHO: ABNORMAL
NRBC BLD AUTO-RTO: 0.4 /100 WBC (ref 0–0.2)
NRBC BLD AUTO-RTO: 0.7 /100 WBC (ref 0–0.2)
OXYHGB MFR BLDV: 90.3 % (ref 94–99)
OXYHGB MFR BLDV: 91.4 % (ref 94–99)
PAW @ PEAK INSP FLOW SETTING VENT: 0 CMH2O
PAW @ PEAK INSP FLOW SETTING VENT: 0 CMH2O
PCO2 BLDA: 50.8 MM HG (ref 35–45)
PCO2 BLDA: 52.1 MM HG (ref 35–45)
PCO2 TEMP ADJ BLD: 50.8 MM HG (ref 35–45)
PCO2 TEMP ADJ BLD: 52.1 MM HG (ref 35–45)
PEEP RESPIRATORY: 14 CM[H2O]
PEEP RESPIRATORY: 16 CM[H2O]
PH BLDA: 7.21 PH UNITS (ref 7.35–7.45)
PH BLDA: 7.23 PH UNITS (ref 7.35–7.45)
PH, TEMP CORRECTED: 7.21 PH UNITS
PH, TEMP CORRECTED: 7.23 PH UNITS
PHOSPHATE SERPL-MCNC: 5.2 MG/DL (ref 2.5–4.5)
PLATELET # BLD AUTO: 167 10*3/MM3 (ref 140–450)
PLATELET # BLD AUTO: 178 10*3/MM3 (ref 140–450)
PMV BLD AUTO: 9.7 FL (ref 6–12)
PMV BLD AUTO: 9.8 FL (ref 6–12)
PO2 BLDA: 66.9 MM HG (ref 83–108)
PO2 BLDA: 71.5 MM HG (ref 83–108)
PO2 TEMP ADJ BLD: 66.9 MM HG (ref 83–108)
PO2 TEMP ADJ BLD: 71.5 MM HG (ref 83–108)
POTASSIUM SERPL-SCNC: 4.5 MMOL/L (ref 3.5–5.2)
PROTHROMBIN TIME: 15.7 SECONDS (ref 12.2–14.5)
RBC # BLD AUTO: 2.58 10*6/MM3 (ref 3.77–5.28)
RBC # BLD AUTO: 2.6 10*6/MM3 (ref 3.77–5.28)
SODIUM SERPL-SCNC: 142 MMOL/L (ref 136–145)
TOTAL RATE: 24 BREATHS/MINUTE
TOTAL RATE: 24 BREATHS/MINUTE
UFH PPP CHRO-ACNC: 0.1 IU/ML (ref 0.3–0.7)
UFH PPP CHRO-ACNC: 0.1 IU/ML (ref 0.3–0.7)
VANCOMYCIN SERPL-MCNC: 27.7 MCG/ML (ref 5–40)
VENTILATOR MODE: ABNORMAL
VENTILATOR MODE: ABNORMAL
VT ON VENT VENT: 0.36 ML
VT ON VENT VENT: 0.38 ML
WBC NRBC COR # BLD AUTO: 8.24 10*3/MM3 (ref 3.4–10.8)
WBC NRBC COR # BLD AUTO: 9.06 10*3/MM3 (ref 3.4–10.8)

## 2025-01-06 PROCEDURE — 80048 BASIC METABOLIC PNL TOTAL CA: CPT | Performed by: INTERNAL MEDICINE

## 2025-01-06 PROCEDURE — 25010000002 HEPARIN (PORCINE) PER 1000 UNITS

## 2025-01-06 PROCEDURE — 99233 SBSQ HOSP IP/OBS HIGH 50: CPT | Performed by: INTERNAL MEDICINE

## 2025-01-06 PROCEDURE — 85520 HEPARIN ASSAY: CPT

## 2025-01-06 PROCEDURE — 84100 ASSAY OF PHOSPHORUS: CPT | Performed by: INTERNAL MEDICINE

## 2025-01-06 PROCEDURE — 83050 HGB METHEMOGLOBIN QUAN: CPT

## 2025-01-06 PROCEDURE — 85025 COMPLETE CBC W/AUTO DIFF WBC: CPT

## 2025-01-06 PROCEDURE — 94003 VENT MGMT INPAT SUBQ DAY: CPT

## 2025-01-06 PROCEDURE — 83735 ASSAY OF MAGNESIUM: CPT | Performed by: INTERNAL MEDICINE

## 2025-01-06 PROCEDURE — 25010000002 HEPARIN (PORCINE) 25000-0.45 UT/250ML-% SOLUTION: Performed by: INTERNAL MEDICINE

## 2025-01-06 PROCEDURE — 25810000003 SODIUM CHLORIDE 0.9 % SOLUTION: Performed by: NURSE PRACTITIONER

## 2025-01-06 PROCEDURE — 25010000002 BUMETANIDE PER 0.5 MG: Performed by: STUDENT IN AN ORGANIZED HEALTH CARE EDUCATION/TRAINING PROGRAM

## 2025-01-06 PROCEDURE — 85018 HEMOGLOBIN: CPT

## 2025-01-06 PROCEDURE — 85520 HEPARIN ASSAY: CPT | Performed by: INTERNAL MEDICINE

## 2025-01-06 PROCEDURE — 25010000002 AMPICILLIN PER 500 MG: Performed by: INTERNAL MEDICINE

## 2025-01-06 PROCEDURE — 85730 THROMBOPLASTIN TIME PARTIAL: CPT | Performed by: INTERNAL MEDICINE

## 2025-01-06 PROCEDURE — 25010000002 BUMETANIDE PER 0.5 MG: Performed by: INTERNAL MEDICINE

## 2025-01-06 PROCEDURE — 25010000002 MIDAZOLAM 1 MG/ML 100ML NS 100 MG/100ML SOLUTION: Performed by: NURSE PRACTITIONER

## 2025-01-06 PROCEDURE — 71045 X-RAY EXAM CHEST 1 VIEW: CPT

## 2025-01-06 PROCEDURE — 82375 ASSAY CARBOXYHB QUANT: CPT

## 2025-01-06 PROCEDURE — 82948 REAGENT STRIP/BLOOD GLUCOSE: CPT

## 2025-01-06 PROCEDURE — 85025 COMPLETE CBC W/AUTO DIFF WBC: CPT | Performed by: INTERNAL MEDICINE

## 2025-01-06 PROCEDURE — 85018 HEMOGLOBIN: CPT | Performed by: INTERNAL MEDICINE

## 2025-01-06 PROCEDURE — 82805 BLOOD GASES W/O2 SATURATION: CPT

## 2025-01-06 PROCEDURE — 85610 PROTHROMBIN TIME: CPT | Performed by: INTERNAL MEDICINE

## 2025-01-06 PROCEDURE — 85014 HEMATOCRIT: CPT | Performed by: INTERNAL MEDICINE

## 2025-01-06 PROCEDURE — 25010000002 METRONIDAZOLE 500 MG/100ML SOLUTION: Performed by: INTERNAL MEDICINE

## 2025-01-06 PROCEDURE — 94799 UNLISTED PULMONARY SVC/PX: CPT

## 2025-01-06 PROCEDURE — 25010000002 FENTANYL CITRATE (PF) 2500 MCG/50ML SOLUTION: Performed by: NURSE PRACTITIONER

## 2025-01-06 PROCEDURE — 25010000002 AMPICILLIN-SULBACTAM PER 1.5 G: Performed by: INTERNAL MEDICINE

## 2025-01-06 PROCEDURE — 80202 ASSAY OF VANCOMYCIN: CPT

## 2025-01-06 PROCEDURE — 85014 HEMATOCRIT: CPT

## 2025-01-06 RX ORDER — BUMETANIDE 0.25 MG/ML
2 INJECTION, SOLUTION INTRAMUSCULAR; INTRAVENOUS ONCE
Status: DISCONTINUED | OUTPATIENT
Start: 2025-01-06 | End: 2025-01-06

## 2025-01-06 RX ORDER — HEPARIN SODIUM 10000 [USP'U]/100ML
26 INJECTION, SOLUTION INTRAVENOUS
Status: DISCONTINUED | OUTPATIENT
Start: 2025-01-06 | End: 2025-01-08

## 2025-01-06 RX ORDER — BUMETANIDE 0.25 MG/ML
2 INJECTION, SOLUTION INTRAMUSCULAR; INTRAVENOUS ONCE
Status: COMPLETED | OUTPATIENT
Start: 2025-01-06 | End: 2025-01-06

## 2025-01-06 RX ORDER — HEPARIN SODIUM 1000 [USP'U]/ML
4000 INJECTION, SOLUTION INTRAVENOUS; SUBCUTANEOUS ONCE
Status: COMPLETED | OUTPATIENT
Start: 2025-01-06 | End: 2025-01-06

## 2025-01-06 RX ADMIN — INSULIN HUMAN 14.9 UNITS/HR: 1 INJECTION, SOLUTION INTRAVENOUS at 10:20

## 2025-01-06 RX ADMIN — AMPICILLIN SODIUM 2 G: 2 INJECTION, POWDER, FOR SOLUTION INTRAMUSCULAR; INTRAVENOUS at 08:25

## 2025-01-06 RX ADMIN — ACETAMINOPHEN 650 MG: 650 SOLUTION ORAL at 22:12

## 2025-01-06 RX ADMIN — NYSTATIN: 100000 POWDER TOPICAL at 08:26

## 2025-01-06 RX ADMIN — BUMETANIDE 1 MG: 0.25 INJECTION INTRAMUSCULAR; INTRAVENOUS at 02:04

## 2025-01-06 RX ADMIN — MIDAZOLAM HYDROCHLORIDE 10 MG/HR: 1 INJECTION, SOLUTION INTRAVENOUS at 09:33

## 2025-01-06 RX ADMIN — ACETAMINOPHEN 650 MG: 650 SOLUTION ORAL at 08:44

## 2025-01-06 RX ADMIN — Medication 300 MCG/HR: at 18:27

## 2025-01-06 RX ADMIN — CHLORHEXIDINE GLUCONATE 0.12% ORAL RINSE 15 ML: 1.2 LIQUID ORAL at 20:00

## 2025-01-06 RX ADMIN — HEPARIN SODIUM 9 UNITS/KG/HR: 10000 INJECTION, SOLUTION INTRAVENOUS at 14:42

## 2025-01-06 RX ADMIN — Medication 300 MCG/HR: at 10:20

## 2025-01-06 RX ADMIN — METRONIDAZOLE 500 MG: 500 INJECTION, SOLUTION INTRAVENOUS at 04:07

## 2025-01-06 RX ADMIN — MIDAZOLAM HYDROCHLORIDE 10 MG/HR: 1 INJECTION, SOLUTION INTRAVENOUS at 18:27

## 2025-01-06 RX ADMIN — INSULIN HUMAN 14.4 UNITS/HR: 1 INJECTION, SOLUTION INTRAVENOUS at 05:33

## 2025-01-06 RX ADMIN — MIDAZOLAM HYDROCHLORIDE 10 MG/HR: 1 INJECTION, SOLUTION INTRAVENOUS at 01:36

## 2025-01-06 RX ADMIN — FAMOTIDINE 20 MG: 10 INJECTION, SOLUTION INTRAVENOUS at 20:00

## 2025-01-06 RX ADMIN — FAMOTIDINE 20 MG: 10 INJECTION, SOLUTION INTRAVENOUS at 08:25

## 2025-01-06 RX ADMIN — SENNOSIDES AND DOCUSATE SODIUM 2 TABLET: 50; 8.6 TABLET ORAL at 08:25

## 2025-01-06 RX ADMIN — INSULIN HUMAN 15.4 UNITS/HR: 1 INJECTION, SOLUTION INTRAVENOUS at 18:27

## 2025-01-06 RX ADMIN — AMPICILLIN SODIUM, SULBACTAM SODIUM 3 G: 2; 1 INJECTION, POWDER, FOR SOLUTION INTRAMUSCULAR; INTRAVENOUS at 14:41

## 2025-01-06 RX ADMIN — MUPIROCIN 1 APPLICATION: 20 OINTMENT TOPICAL at 08:25

## 2025-01-06 RX ADMIN — HEPARIN SODIUM 4000 UNITS: 1000 INJECTION INTRAVENOUS; SUBCUTANEOUS at 21:12

## 2025-01-06 RX ADMIN — AMPICILLIN SODIUM 2 G: 2 INJECTION, POWDER, FOR SOLUTION INTRAMUSCULAR; INTRAVENOUS at 02:06

## 2025-01-06 RX ADMIN — MUPIROCIN 1 APPLICATION: 20 OINTMENT TOPICAL at 20:00

## 2025-01-06 RX ADMIN — BUMETANIDE 2 MG: 0.25 INJECTION INTRAMUSCULAR; INTRAVENOUS at 18:27

## 2025-01-06 RX ADMIN — CHLORHEXIDINE GLUCONATE 0.12% ORAL RINSE 15 ML: 1.2 LIQUID ORAL at 08:25

## 2025-01-06 RX ADMIN — Medication 300 MCG/HR: at 01:36

## 2025-01-06 RX ADMIN — NYSTATIN: 100000 POWDER TOPICAL at 20:00

## 2025-01-06 RX ADMIN — SENNOSIDES AND DOCUSATE SODIUM 2 TABLET: 50; 8.6 TABLET ORAL at 20:00

## 2025-01-06 NOTE — CASE MANAGEMENT/SOCIAL WORK
Continued Stay Note  [facility]     Patient Name: Natalia Arzate  MRN: [id number]  Today's Date: 1/6/2025    Admit Date: 1/2/2025    Plan: ongoing   Discharge Plan       Row Name 01/06/25 1155       Plan    Plan ongoing    Patient/Family in Agreement with Plan other (see comments)    Plan Comments Discussed in MDR, patient is intubated/sedated transferred from [facility] [place] on 1/2. Had bronch 1/4. No requiring pressors @ present.D/C TBd. CM will continue to follow.                   Discharge Codes    No documentation.                       Rj Valle RN

## 2025-01-06 NOTE — PROGRESS NOTES
"Pharmacy Consult - Vancomycin Dosing and Monitoring (Renal Dysfunction / Dialysis)    Natalia Arzate is a 39 y.o. female receiving vancomycin therapy.     Indication:  MRSA PNA  Consulting Provider:   Intensivist  ID Consult:  No    Goal Trough (Pulse Dosing):  15-20 mcg/mL    Current Antimicrobial Therapy  Anti-Infectives (From admission, onward)      Ordered     Dose/Rate Route Frequency Start Stop    01/05/25 0738  ampicillin 2000 mg IVPB in 100 mL NS (MBP)        Ordering Provider: Avelino Tiffanie V., DO    2 g  200 mL/hr over 30 Minutes Intravenous Every 6 Hours 01/05/25 0900 01/11/25 0859    01/05/25 0800  Vancomycin HCl 1,250 mg in sodium chloride 0.9 % 250 mL VTB        Ordering Provider: Rocky Brewster RPH    1,250 mg  200 mL/hr over 75 Minutes Intravenous Once 01/05/25 0900 01/05/25 1105    01/04/25 1511  ampicillin 2000 mg IVPB in 100 mL NS (MBP)  Status:  Discontinued        Ordering Provider: Bettie Walter APRN    2 g  200 mL/hr over 30 Minutes Intravenous Every 4 Hours 01/04/25 2100 01/05/25 0738    01/04/25 1500  ampicillin 1780 mg/100 mL (89% dose) IVPB for IV graded dose challenge        Ordering Provider: Bettie Walter APRN   Placed in \"Followed by\" Linked Group    1,780 mg  400 mL/hr over 15 Minutes Intravenous Once 01/04/25 1800 01/04/25 1818    01/04/25 1500  ampicillin 200 mg/100 mL (10% dose) IVPB for IV graded dose challenge        Ordering Provider: Bettie Walter APRN   Placed in \"Followed by\" Linked Group    200 mg  400 mL/hr over 15 Minutes Intravenous Once 01/04/25 1700 01/04/25 1712    01/04/25 1500  ampicillin 20 mg/100 mL (1% dose) IVPB for IV graded dose challenge        Ordering Provider: Bettie Walter APRN   Placed in \"Followed by\" Linked Group    20 mg  400 mL/hr over 15 Minutes Intravenous Once 01/04/25 1600 01/04/25 1617    01/04/25 0833  vancomycin (dosing per levels)        Ordering Provider: Rocky Brewster RPH     Not Applicable Daily 01/04/25 0930 " 01/08/25 0859    01/03/25 1114  cefepime 2000 mg IVPB in 100 mL NS (MBP)  Status:  Discontinued        Ordering Provider: David Lazaro PharmD    2,000 mg  over 4 Hours Intravenous Every 8 Hours 01/03/25 1600 01/04/25 1500    01/02/25 1548  vancomycin (dosing per levels)  Status:  Discontinued        Ordering Provider: David Lazaro PharmD     Not Applicable Daily 01/03/25 0900 01/03/25 0738    01/03/25 0738  Vancomycin HCl 1,250 mg in sodium chloride 0.9 % 250 mL VTB  Status:  Discontinued        Ordering Provider: David Lazaro PharmD    1,250 mg  200 mL/hr over 75 Minutes Intravenous Every 12 Hours 01/03/25 0900 01/04/25 0833    01/02/25 1231  cefepime 2000 mg IVPB in 100 mL NS (MBP)  Status:  Discontinued        Ordering Provider: Bettie Walter APRN    2,000 mg  over 4 Hours Intravenous Every 12 Hours 01/02/25 2100 01/03/25 1114    01/02/25 1952  metroNIDAZOLE (FLAGYL) IVPB 500 mg        Ordering Provider: Roly Garcia MD    500 mg  200 mL/hr over 30 Minutes Intravenous Every 8 Hours 01/02/25 2100 01/09/25 2059    01/02/25 1547  vancomycin (VANCOCIN) 1,000 mg in sodium chloride 0.9 % 250 mL IVPB-VTB        Ordering Provider: David Lazaro PharmD    1,000 mg  250 mL/hr over 60 Minutes Intravenous Once 01/02/25 1645 01/02/25 1811    01/02/25 1511  Pharmacy to dose vancomycin        Ordering Provider: David Lazaro PharmD     Not Applicable Continuous PRN 01/02/25 1509 01/09/25 1508    01/02/25 1235  cefepime 2000 mg IVPB in 100 mL NS (MBP)        Ordering Provider: Bettie Walter APRN    2,000 mg  over 30 Minutes Intravenous Once 01/02/25 1330 01/02/25 1317    01/02/25 1228  cefTRIAXone (ROCEPHIN) 2,000 mg in sodium chloride 0.9 % 100 mL MBP  Status:  Discontinued        Ordering Provider: Bettie Walter APRN    2,000 mg  200 mL/hr over 30 Minutes Intravenous Every 24 Hours 01/02/25 1315 01/02/25 1230          Allergies  Allergies as of 01/02/2025 - Reviewed 01/02/2025  "  Allergen Reaction Noted    Salvia officinalis Itching, Other (See Comments), and Shortness Of Breath 03/02/2023    Shellfish allergy Anaphylaxis 12/30/2018    Toradol [ketorolac tromethamine] Anaphylaxis and Hives 06/27/2016    Tree nut Anaphylaxis and Shortness Of Breath 03/16/2022    Haldol [haloperidol] Hives and Mental Status Change 03/04/2020    Tramadol Hives and Swelling 12/08/2018    Amoxicillin Hives and Rash 06/27/2016    Penicillins Hives and Rash 06/27/2016     Labs  Results from last 7 days   Lab Units 01/06/25 0319 01/05/25  0533 01/04/25  0434   BUN mg/dL 49* 42* 37*   CREATININE mg/dL 2.99* 2.22* 1.99*     Results from last 7 days   Lab Units 01/06/25 0319 01/05/25 0533 01/04/25  0434   WBC 10*3/mm3 9.06 9.82 8.93     Evaluation of Dosing     Last Dose Received in the ED/Outside Facility: 2500mg IV x 1 given 1/2 at ~2100  Is Patient on Dialysis or Renal Replacement: No; potential RRT    Height - 162.6 cm (64.02\")  Weight - (!) 159 kg (350 lb 11.2 oz)    Estimated Creatinine Clearance: 38.8 mL/min (A) (by C-G formula based on SCr of 2.99 mg/dL (H)).    Intake & Output (last 2 days)         01/04 0701 01/05 0700 01/05 0701 01/06 0700 01/06 0701 01/07 0700    I.V. (mL/kg) 4190.9 (26.4) 3164.5 (19.9) 259 (1.6)    Blood 360      Other  75 200    NG/GT  123 185    IV Piggyback 471 1000     Total Intake(mL/kg) 5021.9 (31.6) 4362.5 (27.4) 644 (4.1)    Urine (mL/kg/hr) 2745 (0.7) 975 (0.3) 60 (0.1)    Emesis/NG output 2000 200     Total Output 4745 1175 60    Net +276.9 +3187.5 +584                 Microbiology  Microbiology Results (last 10 days)       Procedure Component Value - Date/Time    Respiratory Culture - Bronchial Wash, Lung, Left Lower Lobe [[id number]]  (Abnormal)  (Susceptibility) Collected: 01/04/25 075    Lab Status: Final result Specimen: Bronchial Wash from Lung, Left Lower Lobe Updated: 01/06/25 0908     Respiratory Culture Scant growth (1+) Staphylococcus aureus, MRSA     Comment: "   Methicillin resistant Staphylococcus aureus, Patient may be an isolation risk.         No Normal Respiratory Margo     Gram Stain Moderate (3+) WBCs seen      Rare (1+) Gram positive cocci in pairs and clusters    Susceptibility        Staphylococcus aureus, MRSA      KELSIE      Clindamycin 0.25 ug/ml Resistant      Linezolid 2 ug/ml Susceptible      Oxacillin >=4 ug/ml Resistant      Tetracycline <=1 ug/ml Susceptible      Trimethoprim + Sulfamethoxazole <=10 ug/ml Susceptible      Vancomycin 1 ug/ml Susceptible                       Susceptibility Comments       Staphylococcus aureus, MRSA    This isolate is presumed to be clindamycin resistant based on detection of inducible clindamycin resistance.  Clindamycin may still be effective in some patients.  This isolate is presumed to be clindamycin resistant based on detection of inducible clindamycin resistance.  Clindamycin may still be effective in some patients.               Eosinophil Smear - Urine, Indwelling Urethral Catheter [[id number]]  (Normal) Collected: 01/02/25 1542    Lab Status: Final result Specimen: Urine from Indwelling Urethral Catheter Updated: 01/02/25 1700     Eosinophil Smear 0 % EOS/100 Cells     Narrative:      No eosinophil seen    MRSA Screen, PCR (Inpatient) - Swab, Nares [[id number]]  (Abnormal) Collected: 01/02/25 1246    Lab Status: Final result Specimen: Swab from Nares Updated: 01/02/25 1504     MRSA PCR Positive    Narrative:      The negative predictive value of this diagnostic test is high and should only be used to consider de-escalating anti-MRSA therapy. A positive result may indicate colonization with MRSA and must be correlated clinically.    Urine Culture - Urine, Indwelling Urethral Catheter [[id number]]  (Normal) Collected: 01/02/25 1213    Lab Status: Final result Specimen: Urine from Indwelling Urethral Catheter Updated: 01/04/25 1251     Urine Culture No growth    S. Pneumo Ag Urine or CSF - Urine, Urine, Clean Catch  [[id number]]  (Normal) Collected: 01/02/25 1213    Lab Status: Final result Specimen: Urine, Clean Catch Updated: 01/02/25 1913     Strep Pneumo Ag Negative    Legionella Antigen, Urine - Urine, Urine, Clean Catch [[id number]]  (Normal) Collected: 01/02/25 1213    Lab Status: Final result Specimen: Urine, Clean Catch Updated: 01/02/25 1913     LEGIONELLA ANTIGEN, URINE Negative    Respiratory Culture - Sputum, ET Suction [[id number]]  (Abnormal)  (Susceptibility) Collected: 01/02/25 1212    Lab Status: Final result Specimen: Sputum from ET Suction Updated: 01/04/25 0939     Respiratory Culture Moderate growth (3+) Staphylococcus aureus, MRSA      No Normal Respiratory Margo     Gram Stain Many (4+) WBCs per low power field      Rare (1+) Epithelial cells per low power field      Many (4+) Gram positive cocci in pairs and clusters    Susceptibility        Staphylococcus aureus, MRSA      KELSIE      Clindamycin 0.25 ug/ml Resistant      Linezolid 2 ug/ml Susceptible      Oxacillin >=4 ug/ml Resistant      Tetracycline <=1 ug/ml Susceptible      Trimethoprim + Sulfamethoxazole <=10 ug/ml Susceptible      Vancomycin <=0.5 ug/ml Susceptible                       Susceptibility Comments       Staphylococcus aureus, MRSA    This isolate is presumed to be clindamycin resistant based on detection of inducible clindamycin resistance.  Clindamycin may still be effective in some patients.               Blood Culture - Blood, Wrist, Right [[id number]]  (Abnormal) Collected: 01/01/25 2039    Lab Status: Final result Specimen: Blood from Wrist, Right Updated: 01/04/25 0707     Blood Culture Staphylococcus, coagulase negative     Isolated from Aerobic Bottle     Gram Stain Gram positive cocci in clusters    Narrative:      Probable contaminant requires clinical correlation, susceptibility not performed unless requested by physician.      Blood Culture - Blood, Wrist, Left [[id number]]  (Abnormal) Collected: 01/01/25 2039    Lab Status:  Final result Specimen: Blood from Wrist, Left Updated: 01/04/25 0702     Blood Culture Gemella sanguinis     Comment: Currently there are no standardized antimicrobial testing guidelines for Gemella species. Literature search has this organism sensitive to penicillin and ampicillin.          Isolated from Anaerobic Bottle     Gram Stain Anaerobic Bottle Gram positive cocci in clusters    Blood Culture ID, PCR - Blood, Wrist, Left [[id number]] Collected: 01/01/25 2039    Lab Status: Final result Specimen: Blood from Wrist, Left Updated: 01/02/25 1604     BCID, PCR Negative by BCID PCR. Culture to Follow.     BOTTLE TYPE Anaerobic Bottle    Respiratory Panel PCR w/COVID-19(SARS-CoV-2) [facility] In-House, NP Swab in UTM/VTM, 2 HR TAT - Swab, Nasopharynx [[id number]]  (Normal) Collected: 01/01/25 2023    Lab Status: Final result Specimen: Swab from Nasopharynx Updated: 01/01/25 2201     ADENOVIRUS, PCR Not Detected     Coronavirus 229E Not Detected     Coronavirus HKU1 Not Detected     Coronavirus NL63 Not Detected     Coronavirus OC43 Not Detected     COVID19 Not Detected     Human Metapneumovirus Not Detected     Human Rhinovirus/Enterovirus Not Detected     Influenza A PCR Not Detected     Influenza B PCR Not Detected     Parainfluenza Virus 1 Not Detected     Parainfluenza Virus 2 Not Detected     Parainfluenza Virus 3 Not Detected     Parainfluenza Virus 4 Not Detected     RSV, PCR Not Detected     Bordetella pertussis pcr Not Detected     Bordetella parapertussis PCR Not Detected     Chlamydophila pneumoniae PCR Not Detected     Mycoplasma pneumo by PCR Not Detected    Narrative:      In the setting of a positive respiratory panel with a viral infection PLUS a negative procalcitonin without other underlying concern for bacterial infection, consider observing off antibiotics or discontinuation of antibiotics and continue supportive care. If the respiratory panel is positive for atypical bacterial  infection (Bordetella pertussis, Chlamydophila pneumoniae, or Mycoplasma pneumoniae), consider antibiotic de-escalation to target atypical bacterial infection.          Vancomycin Levels  Results from last 7 days   Lab Units 01/06/25  0319 01/05/25  0533 01/04/25  0946 01/03/25  0433 01/02/25  1314   VANCOMYCIN RM mcg/mL 27.70 18.90 27.90 13.40 13.90                 Assessment/Plan:    PTD vancomycin 2/2 MRSA PNA.   Given evidence of MARIAA & solitary kidney, continue w/ pulse dosing. MARIAA continuing to worsen.   Vancomycin 1250 mg (~7.9 mg/kg) IV x 1 was given 1/5/25 AM.  Vancomycin random level w/ AM labs on 1/6/24 was supratherapeutic at 27.7 mcg/mL. No additional doses today. Will re-assess level tomorrow AM and will likely be appropriate for re-dose at that time.   Reassess daily to determine appropriateness of pulse vs scheduled dosing.  Patient tolerated ampicillin graded-dose challenge. Per discussion in rounds, will consolidate antimicrobial regimen to vancomycin/Unasyn.   Pharmacy will continue to follow & adjust dose based on renal function, drug levels, & clinical status.    Thank you,  Monalisa Simms, PharmD  1/6/2025

## 2025-01-06 NOTE — PROGRESS NOTES
Pharmacy to Dose Heparin Infusion Note    Natalia Arzate is a 39 y.o. female receiving heparin infusion.     Therapy for (VTE/Cardiac): VTE  Patient Weight: 159 kg  Initial Bolus (Y/N): No   Any Bolus (Y/N): Yes    Signs or Symptoms of Bleeding: Heparin gtt previously held for dropping hemoglobin. Repeat hemoglobin 7.0 1/6. D/w MD Jerilyn garcia to restart heparin gtt.     VTE (PE/DVT)  Initial rate: 18 units/kg/hr (Max 1,500 units/hr)    Anti-Xa Bolus   Dose Infusion Hold   Time Infusion Rate Change (units/kg/hr) Repeat  Anti-Xa   < 0.11 50 units/kg  (4000 units Max) None Increase by  4 units/kg/hr 6 hours   0.11 - 0.19 25 units/kg  (2000 units Max) None Increase by  3 units/kg/hr 6 hours   0.2 - 0.29 0 None Increase by  2 units/kg/hr 6 hours   0.3 - 0.7 0 None No Change 6 hours (after 2 consecutive levels in range check qAM)   0.71 - 0.8 0 None Decrease by  1 units/kg/hr 6 hours   0.81 - 0.9 0 None Decrease by  2 units/kg/hr 6 hours   0.91 - 1 0 60 minutes Decrease by  3 units/kg/hr 6 hours   >1 0 Hold  After Anti-Xa less than 0.7 decrease previous rate by  4 units/kg/hr  Every 2 hours until Anti-Xa less than 0.7 then when infusion restarts in 6 hours     Results from last 7 days   Lab Units 01/06/25  1222 01/06/25  0319 01/05/25  0533 01/04/25  1703 01/04/25  0434 01/03/25  0433 01/02/25  1538   INR   --   --   --   --   --   --  1.15*   HEMOGLOBIN g/dL 7.0* 7.6* 8.0*   < > 6.7*   < >  --    HEMATOCRIT % 23.8* 26.3* 27.1*   < > 23.0*   < >  --    PLATELETS 10*3/mm3  --  167 197  --  195   < >  --     < > = values in this interval not displayed.       Date   Time   Anti-Xa Current Rate (units/kg/hr) Bolus   (units) Rate Change   (units/kg/hr) New Rate (units/kg/hr) Repeat  Anti-Xa Comments /  Pump Check    1/2 1500 pending -- -- +9 9 0000 D/w RN; will await labs, but expect this to be an aPTT assay    1/2 N/a N/a 9 -- -- 9 2100 Baseline anti-Xa 0.1, re-timed next check for 2100.    1/2 2021 0.1 9 4000 +4 13 0400 MYRA RN    1/3 0500 0.10 13 4000 +4 17 1200 D/w RN   1/3 1200 0.19 17 4000 +1 18 2000 D/w RN   1/3 2035 0.23 18 -- +2 20 0400 D/w RN   1/4 0545 0.23 18 -- +2 22 1200 D/w RN     1/4   0815   HOLD   22   HOLD   HOLD   HOLD   HOLD D/w Tio, ROB & APRN; hold heparin drip for now 2/2 H&H dropping; no overt bleeding noted;   MAR placeholder entered.   1/6 1300 pending -- -- +9 9 2000 D/w Dr. De Jesus and Tio, RN. Restart heparin gtt. Will not give initial bolus given concern for dropping hgb. Ok for future boluses.                                                                                                                                           Monalisa Simms, PharmD  1/6/2025  12:56 EST

## 2025-01-06 NOTE — PROGRESS NOTES
Intensive Care Follow-up     Hospital:  LOS: 4 days   Ms. Natalia Arzate, 39 y.o. female is followed for:   Acute respiratory failure with hypercapnia        Subjective     Ms. Arzate is a 38yo F with a history of HLD, Hypothyroidism, Afib, PE/DVT, Factor V Leiden mutation, and stroke who presented to [facility] with altered mental status. Labs there were significant for renal failure, hyperglycemia, and hypercapnic respiratory failure. Imaging consistent with basilar pulmonary infiltrates concerning for pneumonia. She was intubated and required the initiation of vasopressors. She was transferred to [facility] on 1/2/25 for ongoing care.      Since arrival, she has continued to require mechanical ventilation along with vasopressors. She has been started on Vancomycin, Flagyl and Cefepime. Cultures are significant for MRSA in the sputum along with gemella sanguinis in the blood.      Nephrology has been following for MARIAA with poor urine output and hyperkalemia.      On the morning of 1/4/25, she developed worsening hypoxia despite an FiO2 of 100% and PEEP of 16. Imaging showed white out of the left lung. Bronchoscopy was performed with mucopurulent secretions suctioned from the left upper and left lower lobes. Repeat CXR after bronchoscopy showed some improvement in left upper lobe aeration with persistent left lower lobe disease vs pleural effusion.   Interval History:  The chart has been reviewed.  The patient continues to be febrile.  Secretions are minimal.  Currently on FiO2 of 50% with a PEEP of 16.  I was able to reduce this to 14 of PEEP without problem.  Culture data has been reviewed.    The patient's past medical, surgical and social history were reviewed and updated in Epic as appropriate.        Objective     Infusions:  fentanyl 10 mcg/mL,  mcg/hr, Last Rate: 300 mcg/hr (01/06/25 1020)  insulin, 0-100 Units/hr, Last Rate: 14.9 Units/hr (01/06/25 1020)  Midazolam 1 mg/mL 100mL NS, 1-10 mg/hr, Last Rate: 10  "mg/hr (01/06/25 0969)  norepinephrine, 0.02-0.3 mcg/kg/min, Last Rate: Stopped (01/06/25 0153)  Pharmacy to Dose Heparin,   Pharmacy to dose vancomycin,       Medications:  Pharmacy Consult, , Not Applicable, BID  ampicillin-sulbactam, 3 g, Intravenous, Q12H  chlorhexidine, 15 mL, Mouth/Throat, Q12H  famotidine, 20 mg, Intravenous, BID  mupirocin, 1 Application, Each Nare, BID  nystatin, , Topical, Q12H  senna-docusate sodium, 2 tablet, Nasogastric, BID  vancomycin (dosing per levels), , Not Applicable, Daily        Vital Sign Min/Max for last 24 hours  Temp  Min: 99 °F (37.2 °C)  Max: 101.6 °F (38.7 °C)   BP  Min: 74/35  Max: 144/50   Pulse  Min: 87  Max: 115   Resp  Min: 24  Max: 24   SpO2  Min: 87 %  Max: 98 %   No data recorded       Input/Output for last 24 hour shift  01/05 0701 - 01/06 0700  In: 4362.5 [I.V.:3164.5]  Out: 1175 [Urine:975]   Mode: VC+/AC  FiO2 (%):  [50 %-85 %] 50 %  S RR:  [24] 24  S VT:  [380 mL] 380 mL  PEEP/CPAP (cm H2O):  [14 cm H20-16 cm H20] 14 cm H20  MAP (cm H2O):  [18-21] 18  Objective:  General Appearance:  Uncomfortable, ill-appearing and in no acute distress.    Vital signs: (most recent): Blood pressure 115/53, pulse 101, temperature (!) 100.7 °F (38.2 °C), temperature source Axillary, resp. rate 24, height 162.6 cm (64.02\"), weight (!) 159 kg (350 lb 11.2 oz), SpO2 93%, not currently breastfeeding.    HEENT: (Endotracheal tube in place.)    Lungs:  There are decreased breath sounds and wheezes.  No rales or rhonchi.    Heart: Tachycardia.  Regular rhythm.  S1 normal and S2 normal.  No murmur.   Chest: Symmetric chest wall expansion.   Abdomen: Abdomen is soft and non-distended.  Bowel sounds are normal.   There is no abdominal tenderness.     Extremities: There is dependent edema.    Neurological: (Sedated, minimally responsive.).    Pupils:  Pupils are equal, round, and reactive to light.  Pupils are equal.   Skin:  Warm.                Results from last 7 days   Lab Units " 01/06/25  0319 01/05/25  0533 01/04/25  1703 01/04/25  0434   WBC 10*3/mm3 9.06 9.82  --  8.93   HEMOGLOBIN g/dL 7.6* 8.0* 8.2* 6.7*   PLATELETS 10*3/mm3 167 197  --  195     Results from last 7 days   Lab Units 01/06/25  0319 01/05/25  0533 01/04/25  0434 01/03/25  0433 01/02/25  1538 01/02/25  1314   SODIUM mmol/L 142 143 144 142  142  142   < > 136   POTASSIUM mmol/L 4.5 3.9 4.3 4.2  4.2  4.2   < > 6.1*   CO2 mmol/L 18.0* 22.0 23.0 21.0*  24.0  24.0   < > 21.0*   BUN mg/dL 49* 42* 37* 29*  28*  28*   < > 39*   CREATININE mg/dL 2.99* 2.22* 1.99* 1.26*  1.29*  1.29*   < > 1.69*   MAGNESIUM mg/dL 1.8  --   --  2.0  2.0  --  1.7   PHOSPHORUS mg/dL 5.2*  --   --  2.8  2.7  --  4.5   GLUCOSE mg/dL 118* 107* 107* 142*  147*  147*   < > 377*    < > = values in this interval not displayed.     Estimated Creatinine Clearance: 38.8 mL/min (A) (by C-G formula based on SCr of 2.99 mg/dL (H)).    Results from last 7 days   Lab Units 01/06/25  0311   PH, ARTERIAL pH units 7.209*   PCO2, ARTERIAL mm Hg 52.1*   PO2 ART mm Hg 71.5*         I reviewed the patient's results and images.     Assessment & Plan   Impression        Acute respiratory failure with hypercapnia    MARIAA (acute kidney injury)    Type 2 diabetes mellitus with hyperglycemia    Sepsis       Plan        For underlying sepsis and tracheobronchitis, we will plan to de-escalate to Unasyn and vancomycin for now.  Due to underlying anemia, repeat hemoglobin and we will continue to follow.  Hold further diuresis for now and we will monitor renal function closely.  Continue with insulin drip for now and we will see if we can stabilize her control with subcutaneous coverage soon.  Initiate trophic tube feedings.  Renal replacement therapy per nephrology.  Reinitiate heparin drip if hemoglobin remained stable.  Continue to wean ventilator as tolerated.  She is not yet ready for attempts at spontaneous mode due to high FiO2 and PEEP.    Plan of care and goals  reviewed with mulitdisciplinary/antibiotic stewardship team during rounds.   I discussed the patient's findings and my recommendations with patient and nursing staff     High level of risk due to:  drug(s) requiring intensive monitoring for toxicity and parenteral controlled substances.      Saji De Jesus MD, [facility]  Pulmonology and Critical Care Medicine

## 2025-01-06 NOTE — PROGRESS NOTES
LOS: 4 days   Patient Care Team:  Bladimir Calle PA-C as PCP - General (Physician Assistant)    Chief Complaint: MARIAA   Hyperkalemia    Subjective     Seen and examined at bedside for the first time.   Remains critically ill, intubated sedated.   Minimal urine output overnight.  Hypotensive earlier today.     History taken from: family RN    Objective     Vital Sign Min/Max for last 24 hours  Temp  Min: 99 °F (37.2 °C)  Max: 100.7 °F (38.2 °C)   BP  Min: 74/35  Max: 144/50   Pulse  Min: 92  Max: 115   Resp  Min: 24  Max: 24   SpO2  Min: 87 %  Max: 96 %   No data recorded   No data recorded         I/O this shift:  In: 1676.9 [I.V.:945.9; Other:375; NG/GT:356]  Out: 120 [Urine:120]  I/O last 3 completed shifts:  In: 5645 [I.V.:4252; Other:75; NG/GT:123; IV Piggyback:1195]  Out: 3100 [Urine:1850; Emesis/NG output:1250]    Objective    Gen: intubated and sedated.    HENT: NC, AT  NECK: Supple, ETT in place  LUNGS: Coarse breath sound on vent, non labored respirtation   CVS: S1/S2 audible, RRR, no murmur   Abd: Soft, NT, ND, BS+   Ext: Trace edema, no cyanosis   CNS: Intubated and sedated.   Psy: Intubated and sedated.   Skin: Warm, dry and intact    Results Review:     I reviewed the patient's new clinical results.    WBC WBC   Date Value Ref Range Status   01/06/2025 9.06 3.40 - 10.80 10*3/mm3 Final   01/05/2025 9.82 3.40 - 10.80 10*3/mm3 Final   01/04/2025 8.93 3.40 - 10.80 10*3/mm3 Final      HGB Hemoglobin   Date Value Ref Range Status   01/06/2025 7.0 (L) 12.0 - 15.9 g/dL Final   01/06/2025 7.6 (L) 12.0 - 15.9 g/dL Final   01/05/2025 8.0 (L) 12.0 - 15.9 g/dL Final   01/04/2025 8.2 (L) 12.0 - 15.9 g/dL Final   01/04/2025 6.7 (C) 12.0 - 15.9 g/dL Final     Comment:     Confirmed/called      HCT Hematocrit   Date Value Ref Range Status   01/06/2025 23.8 (L) 34.0 - 46.6 % Final   01/06/2025 26.3 (L) 34.0 - 46.6 % Final   01/05/2025 27.1 (L) 34.0 - 46.6 % Final   01/04/2025 27.5 (L) 34.0 - 46.6 % Final  "  01/04/2025 23.0 (L) 34.0 - 46.6 % Final      Platlets No results found for: \"LABPLAT\"   MCV MCV   Date Value Ref Range Status   01/06/2025 101.2 (H) 79.0 - 97.0 fL Final   01/05/2025 98.2 (H) 79.0 - 97.0 fL Final   01/04/2025 99.6 (H) 79.0 - 97.0 fL Final          Sodium Sodium   Date Value Ref Range Status   01/06/2025 142 136 - 145 mmol/L Final   01/05/2025 143 136 - 145 mmol/L Final   01/04/2025 144 136 - 145 mmol/L Final      Potassium Potassium   Date Value Ref Range Status   01/06/2025 4.5 3.5 - 5.2 mmol/L Final   01/05/2025 3.9 3.5 - 5.2 mmol/L Final   01/04/2025 4.3 3.5 - 5.2 mmol/L Final      Chloride Chloride   Date Value Ref Range Status   01/06/2025 104 98 - 107 mmol/L Final   01/05/2025 106 98 - 107 mmol/L Final   01/04/2025 110 (H) 98 - 107 mmol/L Final      CO2 CO2   Date Value Ref Range Status   01/06/2025 18.0 (L) 22.0 - 29.0 mmol/L Final   01/05/2025 22.0 22.0 - 29.0 mmol/L Final   01/04/2025 23.0 22.0 - 29.0 mmol/L Final      BUN BUN   Date Value Ref Range Status   01/06/2025 49 (H) 6 - 20 mg/dL Final   01/05/2025 42 (H) 6 - 20 mg/dL Final   01/04/2025 37 (H) 6 - 20 mg/dL Final      Creatinine Creatinine   Date Value Ref Range Status   01/06/2025 2.99 (H) 0.57 - 1.00 mg/dL Final   01/05/2025 2.22 (H) 0.57 - 1.00 mg/dL Final   01/04/2025 1.99 (H) 0.57 - 1.00 mg/dL Final      Calcium Calcium   Date Value Ref Range Status   01/06/2025 8.2 (L) 8.6 - 10.5 mg/dL Final   01/05/2025 8.0 (L) 8.6 - 10.5 mg/dL Final   01/04/2025 8.0 (L) 8.6 - 10.5 mg/dL Final      PO4 No results found for: \"CAPO4\"   Albumin Albumin   Date Value Ref Range Status   01/05/2025 3.2 (L) 3.5 - 5.2 g/dL Final   01/04/2025 3.0 (L) 3.5 - 5.2 g/dL Final      Magnesium Magnesium   Date Value Ref Range Status   01/06/2025 1.8 1.6 - 2.6 mg/dL Final      Uric Acid No results found for: \"URICACID\"         Results from last 7 days   Lab Units 01/06/25  1222 01/06/25  0319 01/05/25  0533 01/04/25  1703 01/04/25  0434 01/03/25  0433 " 01/02/25  1314 01/02/25  0900   SODIUM mmol/L  --  142 143  --  144 142  142  142   < > 137   POTASSIUM mmol/L  --  4.5 3.9  --  4.3 4.2  4.2  4.2   < > 6.1*   CHLORIDE mmol/L  --  104 106  --  110* 105  105  105   < > 100   CO2 mmol/L  --  18.0* 22.0  --  23.0 21.0*  24.0  24.0   < > 22.6   BUN mg/dL  --  49* 42*  --  37* 29*  28*  28*   < > 40*   CREATININE mg/dL  --  2.99* 2.22*  --  1.99* 1.26*  1.29*  1.29*   < > 2.39*   CALCIUM mg/dL  --  8.2* 8.0*  --  8.0* 8.9  8.8  8.8   < > 8.1*   ALBUMIN g/dL  --   --  3.2*  --  3.0* 3.3*  3.3*  --  3.4*   WBC 10*3/mm3  --  9.06 9.82  --  8.93 8.23   < > 10.48   HEMOGLOBIN g/dL 7.0* 7.6* 8.0* 8.2* 6.7* 7.5*   < > 8.4*   PLATELETS 10*3/mm3  --  167 197  --  195 196   < > 232    < > = values in this interval not displayed.       Intake/Output Summary (Last 24 hours) at 1/6/2025 1757  Last data filed at 1/6/2025 1600  Gross per 24 hour   Intake 4931.6 ml   Output 820 ml   Net 4111.6 ml         Medication Review: Yes    Assessment & Plan       Acute respiratory failure with hypercapnia    MARIAA (acute kidney injury)    Type 2 diabetes mellitus with hyperglycemia    Sepsis      Assessment & Plan:    1-MARIAA- non oliguric - Pre-renal azotemia vs sepsis related MARIAA. UA - RBC 3-5, + protein. 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 At home on lisinopril, metformin and topiramate. Initially was improving with IV hydration and hemodynamic support but now worsening and UOP has decreased.   2- Hyperkalemia - improved - with lokelma and bicarb infusion  3- Hyperglycemia   4- Anemia   5- Shock   6- hx of Factor V Leiden deficiency   7- hx of DM   8- Respiratory distress - suspicion of aspiration pneumonia   9- Hx of Gout      Plan:  -Worsening renal function; minimal urine output overnight.  Likely heading towards dialysis.   -Give a dose of Bumex 2 mg today; if no improvement in urine output will need renal replacement therapy likely in next 24-48 hours.   -  Avoid nephrotoxic agents.   - Renal diet   - Adjust meds per renal function   - No emergent need of RRT   - Monitor H/H and transfuse for Hgb less than 7.0     Discussed with RN and Critical care team  Félix Rhodes MD  01/06/25  17:57 EST

## 2025-01-06 NOTE — PROGRESS NOTES
Clinical Nutrition     Patient Name: Natalia Arzate  YOB: 1985  MRN: [id number]  Date of Encounter: 25 11:38 EST  Admission date: 2025  Reason for Visit: MDR, Follow-up protocol, EN    Assessment   Nutrition Assessment   Admission Diagnosis:  Acute respiratory failure [J96.00]    Problem List:    Acute respiratory failure with hypercapnia    MARIAA (acute kidney injury)    Type 2 diabetes mellitus with hyperglycemia    Hyperkalemia    Sepsis    Respiratory acidosis with metabolic acidosis    Acute respiratory failure      PMH:   She  has a past medical history of A-fib, Abnormal ECG, Anemia, Anxiety, Asthma, Cancer, Depression, Diabetes mellitus, DVT (deep venous thrombosis), Factor 5 Leiden mutation, heterozygous, Fibroid, GERD (gastroesophageal reflux disease), Gout, H/O abdominal abscess, History of sepsis, History of transfusion, Hyperlipidemia, Hypothyroid, Kidney stone, Migraines, Neuropathy, Ovarian cancer (2021), Ovarian cyst, PE (pulmonary embolism), Polycystic ovary syndrome, Preeclampsia, Rh incompatibility, Stroke, TIA (transient ischemic attack), Urinary tract infection, and Varicella.    PSH:  She  has a past surgical history that includes  section; Cholecystectomy; Colonoscopy; Cardiac catheterization; Right oophorectomy; Abdominal surgery; Esophagogastroduodenoscopy; ureteroscopy laser lithotripsy with stent insertion (Left, 10/01/2021); Extracorporeal shock wave lithotripsy (Left, 10/22/2021); Lithotripsy; ureteroscopy laser lithotripsy with stent insertion (Left, 2022); ureteroscopy laser lithotripsy with stent insertion (Left, 2022); Nephrectomy (Left, 10/2022); and Hysterectomy ().    Substance history: Opioids    Applicable Nutrition History:   ARF/VENT  25    s/p Bronch 25    SKIN: L flank wound    Labs    Labs Reviewed: Yes    Results from last 7 days   Lab Units 25  0319 25  0533 25  043  01/03/25 2035 01/03/25  0433 01/03/25  0018 01/02/25 2021 01/02/25  1538 01/02/25  1314   GLUCOSE mg/dL 118* 107* 107*  --  142*  147*  147*   < > 234*   < > 377*   BUN mg/dL 49* 42* 37*  --  29*  28*  28*   < > 34*   < > 39*   CREATININE mg/dL 2.99* 2.22* 1.99*  --  1.26*  1.29*  1.29*   < > 1.38*   < > 1.69*   SODIUM mmol/L 142 143 144  --  142  142  142   < > 134*   < > 136   CHLORIDE mmol/L 104 106 110*  --  105  105  105   < > 98   < > 100   POTASSIUM mmol/L 4.5 3.9 4.3  --  4.2  4.2  4.2   < > 4.7   < > 6.1*   PHOSPHORUS mg/dL 5.2*  --   --   --  2.8  2.7  --   --   --  4.5   MAGNESIUM mg/dL 1.8  --   --   --  2.0  2.0  --   --   --  1.7   ALT (SGPT) U/L  --  24 34*  --  54*  54*  --   --   --   --    LACTATE mmol/L  --   --  0.7 1.0  --   --  2.8*  --   --     < > = values in this interval not displayed.       Results from last 7 days   Lab Units 01/05/25  0533 01/04/25 0434 01/03/25  0433   ALBUMIN g/dL 3.2* 3.0* 3.3*  3.3*   PREALBUMIN mg/dL  --  12.4*  --    CRP mg/dL  --   --  22.72*   CHOLESTEROL mg/dL  --   --  111   TRIGLYCERIDES mg/dL  --   --  428*       Results from last 7 days   Lab Units 01/06/25  1019 01/06/25  0821 01/06/25  0603 01/06/25  0401 01/06/25  0203 01/06/25  0007   GLUCOSE mg/dL 124 126 122 122 116 119     Lab Results   Lab Value Date/Time    HGBA1C 9.10 (H) 01/01/2025 2133    HGBA1C 9.6 (H) 12/16/2024 1154    HGBA1C 9.9 (H) 10/15/2024 0505     Results from last 7 days   Lab Units 01/02/25  1314   URIC ACID mg/dL 7.6*     Results from last 7 days   Lab Units 01/02/25  0315 01/01/25  2039   PROBNP pg/mL 6,035.0* 5,671.0*       Medications    Medications Reviewed: yes    Scheduled Meds:Pharmacy Consult, , Not Applicable, BID  ampicillin, 2 g, Intravenous, Q6H  chlorhexidine, 15 mL, Mouth/Throat, Q12H  famotidine, 20 mg, Intravenous, BID  metroNIDAZOLE, 500 mg, Intravenous, Q8H  mupirocin, 1 Application, Each Nare, BID  nystatin, , Topical, Q12H  senna-docusate sodium,  "2 tablet, Nasogastric, BID  vancomycin (dosing per levels), , Not Applicable, Daily      Continuous Infusions:fentanyl 10 mcg/mL,  mcg/hr, Last Rate: 300 mcg/hr (01/06/25 1020)  insulin, 0-100 Units/hr, Last Rate: 14.9 Units/hr (01/06/25 1020)  Midazolam 1 mg/mL 100mL NS, 1-10 mg/hr, Last Rate: 10 mg/hr (01/06/25 0933)  norepinephrine, 0.02-0.3 mcg/kg/min, Last Rate: Stopped (01/06/25 0153)  Pharmacy to Dose Heparin,   Pharmacy to dose vancomycin,       PRN Meds:.  acetaminophen    senna-docusate sodium **AND** polyethylene glycol **AND** [DISCONTINUED] bisacodyl **AND** bisacodyl    dextrose    diphenhydrAMINE    EPINEPHrine    EPINEPHrine    fentaNYL    glucagon (human recombinant)    ipratropium    ipratropium-albuterol    methylPREDNISolone sodium succinate    Pharmacy to Dose Heparin    Pharmacy to dose vancomycin    sodium chloride    Intake/Ouptut 24 hrs (0701 - 0700)   I&O's Reviewed: yes    Intake & Output (last day)         01/05 0701 01/06 0700 01/06 0701 01/07 0700    I.V. (mL/kg) 3164.5 (19.9) 259 (1.6)    Blood      Other 75 200    NG/ 185    IV Piggyback 1000     Total Intake(mL/kg) 4362.5 (27.4) 644 (4.1)    Urine (mL/kg/hr) 975 (0.3) 60 (0.1)    Emesis/NG output 200     Total Output 1175 60    Net +3187.5 +584                 Anthropometrics     Height: Height: 162.6 cm (64.02\")64in  Last Filed Weight: Weight: (!) 159 kg (350 lb 11.2 oz) (01/05/25 0500)350lb  Method: Weight Method: Bed scaleest  BMI: BMI (Calculated): 60.260    UBW: last MD office weight 336lb    Weight change:  ? 27lb wt gain since January     Weight       Weight (kg) Weight (lbs) Weight Method Visit Report   5/24/2023 129.729 kg  286 lb  Stated  --     127.007 kg  280 lb   --    6/24/2023 127.007 kg  280 lb  Stated     7/6/2023 --  --   --    7/26/2023 129.275 kg  285 lb      7/27/2023 132.904 kg  293 lb   --    8/15/2023 124.739 kg  275 lb  Stated     8/17/2023 127.007 kg  280 lb  Stated     9/10/2023 129.275 kg  " "285 lb  Stated     10/6/2023 140.161 kg (H)  309 lb (H)   --    11/7/2023 133.811 kg  295 lb  Stated     11/20/2023 133.811 kg  295 lb      11/24/2023 133.358 kg  294 lb      11/26/2023 133.811 kg  295 lb  Stated     1/2/2024 140.161 kg (H)  309 lb (H)  Stated     2/13/2024 145.605 kg (H)  321 lb (H)  Stated     3/12/2024 --  --   --    4/16/2024 142.611 kg (H)  314 lb 6.4 oz (H)   --    4/18/2024 142.429 kg (H)  314 lb (H)  Stated     4/26/2024 138.801 kg (H)  306 lb (H)  Stated     5/1/2024 138.801 kg (H)  306 lb (H)   --    5/6/2024 146.512 kg (H)  323 lb (H)   --    5/13/2024 146.512 kg (H)  323 lb (H)  Stated     7/8/2024 142.883 kg (H)  315 lb (H)  Stated     7/31/2024 147.691 kg (H)  325 lb 9.6 oz (H)   --    9/22/2024 143.79 kg (H)  317 lb (H)  Stated     12/3/2024 153.588 kg (H)  338 lb 9.6 oz (H)   --    12/16/2024 153.316 kg (H)  338 lb (H)   --     152.409 kg (H)  336 lb (H)   --    1/1/2025 158.759 kg (H)  350 lb (H)  Estimated        Legend:  (H) High  Nutrition Focused Physical Exam     Date:     Unable to perform due to Pt unable to participate at time of visit     Subjective   Reported/Observed/Food/Nutrition Related History:       1-6: pt intubated, sedated, fiancee at bedside  + fentanyl, versed, insulin    Per RN:pt s/p emergent bronch Saturday,  trophic feed started yesterday    Per MD garcia to advance TF    1-3: Pt intubated, sedated, fiancee at bedside  + insulin, fentanyl, versed, heparin, amio    Per RN: pt had wide complex rhythm last night, lokelma on hold as K+ wnl, 103 temp    Per MD ok to start TF,  place keofeed    Needs Assessment   Date: 1-3-25    Height used:Height: 162.6 cm (64.02\")64in  Weights used/ ABW: 336lb/ 153kg   IBW: 120lb/ 55kg    Estimated Calorie needs: ~1500kcal  Method:  22-25Kcals/KG IBW: 1210-1375kcal  Method:  MSJ ABW: 2195kcal  Method:  PSU ABW: 2745kcal    Estimated Protein needs: ~ 138g protein  Method: 2.5g protein per kg IBW: 138g protein  Method: 0.8-1g protein " per kg ABW: 122-153g protein    Current Nutrition Prescription     PO: NPO Diet NPO Type: Tube Feeding  Oral Nutrition Supplement:  Intake: N/A    EN: Peptamen Intense VHP  Goal Rate: 75ml  Water Flushes: 25ml  Modular: None  Route: NG --keofeed located in gastric antrum per KUKADEEM 1-3-25  Tube: Small bore    At goal over: 20Hrs/day     Rx will supply:   Goal Volume 1500  mL/day     Flush Volume 500 mL/day     Energy 1500 Kcal/day 100 % Est Need   Protein 138 g/day 100 % Est Need   Fiber 6 g/day     Water in  EN 1260 mL     Total Water 1760 mL     Meet DRI Yes        --------------------------------------------------------------------------  Product/Rate verified at bedside: Yes  Infusing Rate at time of visit: 25ml    Average Delivery from [facility] Day:   Volume 73 mL/day 5  % Goal Vol.   Flush Volume 125 mL/day     Energy  Kcal/day  % Est Need   Protein  g/day  % Est Need   Fiber  g/day     Water in  EN  mL     Total Water  mL     Meet DRI No           Assessment & Plan   Nutrition Diagnosis   Date: 1-3-25  Updated: 25  Problem Inadequate energy intake    Etiology ARF/VENT   Signs/Symptoms Trophic feed   Status: New    Goal:   Nutrition to support treatment and Adjust EN      Nutrition Intervention      Follow treatment progress, Care plan reviewed, Nutrition support order placed    Rec gradually advance TF as tolerated to goal rate @75ml/hr, free water @25ml/hr    TF at goal volume will provide 1500ml, 1500kcal,100% kcal needs, 138g protein, 100% protein needs, 38meq K+, 1020mg Phos, 6g fiber, 1760ml free water    Monitoring/Evaluation:   Per protocol, I&O, Pertinent labs, Weight, Skin status, GI status, Symptoms, Hemodynamic stability    Maria Del Carmen Matamoros, LOUIE  Time Spent: 30min

## 2025-01-06 NOTE — PLAN OF CARE
Goal Outcome Evaluation:  Plan of Care Reviewed With: child, significant other           Outcome Evaluation: Early in shift began requiring increasingly more FiO2 and was noted to have been on 60% for over 12 hours until 1830 prior to shift.  When FiO2 had risen to 85% new orders were obtained to give an additional dose of bumex as well as a dose of metalazone.  Shortly after was able to wean FiO2 down.  Pt is also noted to be making copious amounts of sputum.  At this time FiO2 is at 50%.  Pt has kept a low range fever currently at 99.7 degrees axillary.  Insulin drip continues and has maintained glucose in a range between 116 and 122, though it has taken anywhere form 13 units to 14.4 units per hour to achieve this.  Arterial line monitoring stopped as the waveform was frequently dampened and BP was inconsistent whereas cuff pressure has been consistent.  Norepinephrine has been weaned off this shift.  AM ABG reveals significant acidosis.  UOP is improving after having a sluggish beginning to shift.  Fentanyl malachi Versed infuse at their previous titrations of 300mcg/hr and 10mg/hr respectively.  Will continue to closely monitor this patient.

## 2025-01-07 ENCOUNTER — APPOINTMENT (OUTPATIENT)
Dept: GENERAL RADIOLOGY | Facility: HOSPITAL | Age: 40
DRG: 870 | End: 2025-01-07
Payer: COMMERCIAL

## 2025-01-07 LAB
ANION GAP SERPL CALCULATED.3IONS-SCNC: 18 MMOL/L (ref 5–15)
ARTERIAL PATENCY WRIST A: ABNORMAL
ATMOSPHERIC PRESS: ABNORMAL MM[HG]
BACTERIA SPEC AEROBE CULT: ABNORMAL
BASE EXCESS BLDA CALC-SCNC: -7.5 MMOL/L (ref 0–2)
BASOPHILS # BLD AUTO: 0.04 10*3/MM3 (ref 0–0.2)
BASOPHILS NFR BLD AUTO: 0.5 % (ref 0–1.5)
BDY SITE: ABNORMAL
BODY TEMPERATURE: 37
BUN SERPL-MCNC: 55 MG/DL (ref 6–20)
BUN/CREAT SERPL: 16.8 (ref 7–25)
CALCIUM SPEC-SCNC: 8.4 MG/DL (ref 8.6–10.5)
CHLORIDE SERPL-SCNC: 108 MMOL/L (ref 98–107)
CO2 BLDA-SCNC: 21.6 MMOL/L (ref 22–33)
CO2 SERPL-SCNC: 19 MMOL/L (ref 22–29)
COHGB MFR BLD: 1.8 % (ref 0–2)
CREAT SERPL-MCNC: 3.27 MG/DL (ref 0.57–1)
DEPRECATED RDW RBC AUTO: 66.5 FL (ref 37–54)
EGFRCR SERPLBLD CKD-EPI 2021: 17.9 ML/MIN/1.73
EOSINOPHIL # BLD AUTO: 0.24 10*3/MM3 (ref 0–0.4)
EOSINOPHIL NFR BLD AUTO: 2.8 % (ref 0.3–6.2)
EPAP: 0
ERYTHROCYTE [DISTWIDTH] IN BLOOD BY AUTOMATED COUNT: 18.5 % (ref 12.3–15.4)
GLUCOSE BLDC GLUCOMTR-MCNC: 111 MG/DL (ref 70–130)
GLUCOSE BLDC GLUCOMTR-MCNC: 112 MG/DL (ref 70–130)
GLUCOSE BLDC GLUCOMTR-MCNC: 116 MG/DL (ref 70–130)
GLUCOSE BLDC GLUCOMTR-MCNC: 118 MG/DL (ref 70–130)
GLUCOSE BLDC GLUCOMTR-MCNC: 119 MG/DL (ref 70–130)
GLUCOSE BLDC GLUCOMTR-MCNC: 127 MG/DL (ref 70–130)
GLUCOSE BLDC GLUCOMTR-MCNC: 128 MG/DL (ref 70–130)
GLUCOSE BLDC GLUCOMTR-MCNC: 129 MG/DL (ref 70–130)
GLUCOSE BLDC GLUCOMTR-MCNC: 142 MG/DL (ref 70–130)
GLUCOSE SERPL-MCNC: 119 MG/DL (ref 65–99)
GRAM STN SPEC: ABNORMAL
HCO3 BLDA-SCNC: 20 MMOL/L (ref 20–26)
HCT VFR BLD AUTO: 26.8 % (ref 34–46.6)
HCT VFR BLD CALC: 24.3 % (ref 38–51)
HGB BLD-MCNC: 7.7 G/DL (ref 12–15.9)
HGB BLDA-MCNC: 7.9 G/DL (ref 14–18)
IMM GRANULOCYTES # BLD AUTO: 0.14 10*3/MM3 (ref 0–0.05)
IMM GRANULOCYTES NFR BLD AUTO: 1.6 % (ref 0–0.5)
INHALED O2 CONCENTRATION: 60 %
IPAP: 0
ISOLATED FROM: ABNORMAL
LYMPHOCYTES # BLD AUTO: 1.5 10*3/MM3 (ref 0.7–3.1)
LYMPHOCYTES NFR BLD AUTO: 17.6 % (ref 19.6–45.3)
Lab: ABNORMAL
MAGNESIUM SERPL-MCNC: 1.9 MG/DL (ref 1.6–2.6)
MCH RBC QN AUTO: 28.6 PG (ref 26.6–33)
MCHC RBC AUTO-ENTMCNC: 28.7 G/DL (ref 31.5–35.7)
MCV RBC AUTO: 99.6 FL (ref 79–97)
METHGB BLD QL: 0.3 % (ref 0–1.5)
MODALITY: ABNORMAL
MONOCYTES # BLD AUTO: 0.98 10*3/MM3 (ref 0.1–0.9)
MONOCYTES NFR BLD AUTO: 11.5 % (ref 5–12)
NEUTROPHILS NFR BLD AUTO: 5.64 10*3/MM3 (ref 1.7–7)
NEUTROPHILS NFR BLD AUTO: 66 % (ref 42.7–76)
NOTIFIED BY: ABNORMAL
NOTIFIED WHO: ABNORMAL
NRBC BLD AUTO-RTO: 0.5 /100 WBC (ref 0–0.2)
OXYHGB MFR BLDV: 93.9 % (ref 94–99)
PAW @ PEAK INSP FLOW SETTING VENT: 0 CMH2O
PCO2 BLDA: 50.8 MM HG (ref 35–45)
PCO2 TEMP ADJ BLD: 50.8 MM HG (ref 35–45)
PEEP RESPIRATORY: 14 CM[H2O]
PH BLDA: 7.2 PH UNITS (ref 7.35–7.45)
PH, TEMP CORRECTED: 7.2 PH UNITS
PLATELET # BLD AUTO: 176 10*3/MM3 (ref 140–450)
PMV BLD AUTO: 9.7 FL (ref 6–12)
PO2 BLDA: 82.1 MM HG (ref 83–108)
PO2 TEMP ADJ BLD: 82.1 MM HG (ref 83–108)
POTASSIUM SERPL-SCNC: 4.2 MMOL/L (ref 3.5–5.2)
RBC # BLD AUTO: 2.69 10*6/MM3 (ref 3.77–5.28)
SODIUM SERPL-SCNC: 145 MMOL/L (ref 136–145)
TOTAL RATE: 24 BREATHS/MINUTE
UFH PPP CHRO-ACNC: 0.1 IU/ML (ref 0.3–0.7)
UFH PPP CHRO-ACNC: 0.1 IU/ML (ref 0.3–0.7)
UFH PPP CHRO-ACNC: 0.17 IU/ML (ref 0.3–0.7)
VANCOMYCIN SERPL-MCNC: 21.7 MCG/ML (ref 5–40)
VENTILATOR MODE: ABNORMAL
VT ON VENT VENT: 0.38 ML
WBC NRBC COR # BLD AUTO: 8.54 10*3/MM3 (ref 3.4–10.8)

## 2025-01-07 PROCEDURE — 71045 X-RAY EXAM CHEST 1 VIEW: CPT

## 2025-01-07 PROCEDURE — 25010000002 AMPICILLIN-SULBACTAM PER 1.5 G: Performed by: INTERNAL MEDICINE

## 2025-01-07 PROCEDURE — 94799 UNLISTED PULMONARY SVC/PX: CPT

## 2025-01-07 PROCEDURE — 82948 REAGENT STRIP/BLOOD GLUCOSE: CPT

## 2025-01-07 PROCEDURE — 80048 BASIC METABOLIC PNL TOTAL CA: CPT | Performed by: INTERNAL MEDICINE

## 2025-01-07 PROCEDURE — 25010000002 HEPARIN (PORCINE) 25000-0.45 UT/250ML-% SOLUTION

## 2025-01-07 PROCEDURE — 25010000002 BUMETANIDE PER 0.5 MG: Performed by: INTERNAL MEDICINE

## 2025-01-07 PROCEDURE — 99233 SBSQ HOSP IP/OBS HIGH 50: CPT | Performed by: INTERNAL MEDICINE

## 2025-01-07 PROCEDURE — 82375 ASSAY CARBOXYHB QUANT: CPT

## 2025-01-07 PROCEDURE — 25010000002 MIDAZOLAM 1 MG/ML 100ML NS 100 MG/100ML SOLUTION: Performed by: NURSE PRACTITIONER

## 2025-01-07 PROCEDURE — 25010000002 FENTANYL CITRATE (PF) 2500 MCG/50ML SOLUTION: Performed by: NURSE PRACTITIONER

## 2025-01-07 PROCEDURE — 25010000002 MIDAZOLAM 1 MG/ML 100ML NS 100 MG/100ML SOLUTION

## 2025-01-07 PROCEDURE — 25010000002 HEPARIN (PORCINE) PER 1000 UNITS

## 2025-01-07 PROCEDURE — 94003 VENT MGMT INPAT SUBQ DAY: CPT

## 2025-01-07 PROCEDURE — 80202 ASSAY OF VANCOMYCIN: CPT

## 2025-01-07 PROCEDURE — 25810000003 SODIUM CHLORIDE 0.9 % SOLUTION: Performed by: NURSE PRACTITIONER

## 2025-01-07 PROCEDURE — 85025 COMPLETE CBC W/AUTO DIFF WBC: CPT | Performed by: INTERNAL MEDICINE

## 2025-01-07 PROCEDURE — 83735 ASSAY OF MAGNESIUM: CPT | Performed by: INTERNAL MEDICINE

## 2025-01-07 PROCEDURE — 85520 HEPARIN ASSAY: CPT

## 2025-01-07 PROCEDURE — 82805 BLOOD GASES W/O2 SATURATION: CPT

## 2025-01-07 PROCEDURE — 83050 HGB METHEMOGLOBIN QUAN: CPT

## 2025-01-07 PROCEDURE — 63710000001 INSULIN GLARGINE PER 5 UNITS: Performed by: INTERNAL MEDICINE

## 2025-01-07 PROCEDURE — 63710000001 INSULIN REGULAR HUMAN PER 5 UNITS: Performed by: INTERNAL MEDICINE

## 2025-01-07 RX ORDER — BUMETANIDE 0.25 MG/ML
2 INJECTION, SOLUTION INTRAMUSCULAR; INTRAVENOUS EVERY 12 HOURS
Status: DISCONTINUED | OUTPATIENT
Start: 2025-01-07 | End: 2025-01-10

## 2025-01-07 RX ORDER — HEPARIN SODIUM 1000 [USP'U]/ML
2000 INJECTION, SOLUTION INTRAVENOUS; SUBCUTANEOUS ONCE
Status: COMPLETED | OUTPATIENT
Start: 2025-01-07 | End: 2025-01-07

## 2025-01-07 RX ORDER — ACETAMINOPHEN 160 MG/5ML
650 SOLUTION ORAL EVERY 6 HOURS PRN
Status: DISCONTINUED | OUTPATIENT
Start: 2025-01-07 | End: 2025-01-27

## 2025-01-07 RX ORDER — IBUPROFEN 600 MG/1
1 TABLET ORAL
Status: DISCONTINUED | OUTPATIENT
Start: 2025-01-07 | End: 2025-01-13

## 2025-01-07 RX ORDER — MIDAZOLAM IN NACL,ISO-OSMOT/PF 50 MG/50ML
1-10 INFUSION BOTTLE (ML) INTRAVENOUS
Status: DISPENSED | OUTPATIENT
Start: 2025-01-07 | End: 2025-01-12

## 2025-01-07 RX ORDER — NICOTINE POLACRILEX 4 MG
15 LOZENGE BUCCAL
Status: DISCONTINUED | OUTPATIENT
Start: 2025-01-07 | End: 2025-01-13

## 2025-01-07 RX ORDER — HEPARIN SODIUM 1000 [USP'U]/ML
4000 INJECTION, SOLUTION INTRAVENOUS; SUBCUTANEOUS ONCE
Status: COMPLETED | OUTPATIENT
Start: 2025-01-07 | End: 2025-01-07

## 2025-01-07 RX ORDER — DEXTROSE MONOHYDRATE 25 G/50ML
25 INJECTION, SOLUTION INTRAVENOUS
Status: DISCONTINUED | OUTPATIENT
Start: 2025-01-07 | End: 2025-01-13

## 2025-01-07 RX ADMIN — NOREPINEPHRINE BITARTRATE 0.06 MCG/KG/MIN: 0.03 INJECTION, SOLUTION INTRAVENOUS at 14:21

## 2025-01-07 RX ADMIN — SENNOSIDES AND DOCUSATE SODIUM 2 TABLET: 50; 8.6 TABLET ORAL at 20:24

## 2025-01-07 RX ADMIN — HEPARIN SODIUM 4000 UNITS: 1000 INJECTION INTRAVENOUS; SUBCUTANEOUS at 13:13

## 2025-01-07 RX ADMIN — CHLORHEXIDINE GLUCONATE 0.12% ORAL RINSE 15 ML: 1.2 LIQUID ORAL at 20:19

## 2025-01-07 RX ADMIN — ACETAMINOPHEN 650 MG: 650 SOLUTION ORAL at 18:08

## 2025-01-07 RX ADMIN — CHLORHEXIDINE GLUCONATE 0.12% ORAL RINSE 15 ML: 1.2 LIQUID ORAL at 08:01

## 2025-01-07 RX ADMIN — NYSTATIN: 100000 POWDER TOPICAL at 08:01

## 2025-01-07 RX ADMIN — NOREPINEPHRINE BITARTRATE 0.06 MCG/KG/MIN: 0.03 INJECTION, SOLUTION INTRAVENOUS at 01:01

## 2025-01-07 RX ADMIN — FAMOTIDINE 20 MG: 10 INJECTION, SOLUTION INTRAVENOUS at 20:20

## 2025-01-07 RX ADMIN — Medication 200 MCG/HR: at 13:12

## 2025-01-07 RX ADMIN — INSULIN HUMAN 10 UNITS: 100 INJECTION, SOLUTION PARENTERAL at 18:08

## 2025-01-07 RX ADMIN — INSULIN GLARGINE 35 UNITS: 100 INJECTION, SOLUTION SUBCUTANEOUS at 20:58

## 2025-01-07 RX ADMIN — HEPARIN SODIUM 17 UNITS/KG/HR: 10000 INJECTION, SOLUTION INTRAVENOUS at 06:06

## 2025-01-07 RX ADMIN — INSULIN HUMAN 12.1 UNITS/HR: 1 INJECTION, SOLUTION INTRAVENOUS at 08:08

## 2025-01-07 RX ADMIN — AMPICILLIN SODIUM, SULBACTAM SODIUM 3 G: 2; 1 INJECTION, POWDER, FOR SOLUTION INTRAMUSCULAR; INTRAVENOUS at 02:42

## 2025-01-07 RX ADMIN — POLYETHYLENE GLYCOL 3350 17 G: 17 POWDER, FOR SOLUTION ORAL at 08:01

## 2025-01-07 RX ADMIN — Medication 250 MCG/HR: at 03:11

## 2025-01-07 RX ADMIN — ACETAMINOPHEN 650 MG: 650 SOLUTION ORAL at 12:12

## 2025-01-07 RX ADMIN — AMPICILLIN SODIUM, SULBACTAM SODIUM 3 G: 2; 1 INJECTION, POWDER, FOR SOLUTION INTRAMUSCULAR; INTRAVENOUS at 14:15

## 2025-01-07 RX ADMIN — INSULIN GLARGINE 35 UNITS: 100 INJECTION, SOLUTION SUBCUTANEOUS at 14:15

## 2025-01-07 RX ADMIN — HEPARIN SODIUM 21 UNITS/KG/HR: 10000 INJECTION, SOLUTION INTRAVENOUS at 15:01

## 2025-01-07 RX ADMIN — BUMETANIDE 2 MG: 0.25 INJECTION INTRAMUSCULAR; INTRAVENOUS at 14:15

## 2025-01-07 RX ADMIN — FAMOTIDINE 20 MG: 10 INJECTION, SOLUTION INTRAVENOUS at 08:01

## 2025-01-07 RX ADMIN — HEPARIN SODIUM 2000 UNITS: 1000 INJECTION INTRAVENOUS; SUBCUTANEOUS at 23:27

## 2025-01-07 RX ADMIN — HEPARIN SODIUM 4000 UNITS: 1000 INJECTION INTRAVENOUS; SUBCUTANEOUS at 06:05

## 2025-01-07 RX ADMIN — INSULIN HUMAN 16.1 UNITS/HR: 1 INJECTION, SOLUTION INTRAVENOUS at 01:49

## 2025-01-07 RX ADMIN — SENNOSIDES AND DOCUSATE SODIUM 2 TABLET: 50; 8.6 TABLET ORAL at 08:02

## 2025-01-07 RX ADMIN — MIDAZOLAM HYDROCHLORIDE 8 MG/HR: 1 INJECTION, SOLUTION INTRAVENOUS at 05:27

## 2025-01-07 RX ADMIN — MIDAZOLAM IN SODIUM CHLORIDE 10 MG/HR: 1 INJECTION INTRAVENOUS at 20:18

## 2025-01-07 RX ADMIN — NYSTATIN 1 APPLICATION: 100000 POWDER TOPICAL at 20:21

## 2025-01-07 NOTE — PROGRESS NOTES
LOS: 5 days   Patient Care Team:  Bladimir Calle PA-C as PCP - General (Physician Assistant)    Chief Complaint: MARIAA   Hyperkalemia    Subjective   Started on diuretics yesterday. Responded to bumex however overall fluid balance remains positive +2 liter. Worsening renal function. Remains critically ill.      History taken from: family RN    Objective     Vital Sign Min/Max for last 24 hours  Temp  Min: 99.3 °F (37.4 °C)  Max: 100.9 °F (38.3 °C)   BP  Min: 92/65  Max: 144/58   Pulse  Min: 88  Max: 113   Resp  Min: 24  Max: 24   SpO2  Min: 90 %  Max: 98 %   No data recorded   No data recorded         I/O this shift:  In: 1346.2 [I.V.:567.2; Other:240; NG/GT:539]  Out: 800 [Urine:800]  I/O last 3 completed shifts:  In: 6550.9 [I.V.:4249.9; Other:806; NG/GT:1045; IV Piggyback:450]  Out: 2070 [Urine:2070]    Objective    Gen: intubated and sedated.    HENT: NC, AT  NECK: Supple, ETT in place  LUNGS: Intubated on MV  CVS: S1/S2 audible, RRR, no murmur   Abd: Soft, NT, ND, BS+   Ext: +1 UE and LE edema, no cyanosis   CNS: Intubated and sedated.   Psy: Intubated and sedated.   Skin: Warm, dry and intact    Results Review:     I reviewed the patient's new clinical results.    WBC WBC   Date Value Ref Range Status   01/07/2025 8.54 3.40 - 10.80 10*3/mm3 Final   01/06/2025 8.24 3.40 - 10.80 10*3/mm3 Final   01/06/2025 9.06 3.40 - 10.80 10*3/mm3 Final   01/05/2025 9.82 3.40 - 10.80 10*3/mm3 Final      HGB Hemoglobin   Date Value Ref Range Status   01/07/2025 7.7 (L) 12.0 - 15.9 g/dL Final   01/06/2025 7.5 (L) 12.0 - 15.9 g/dL Final   01/06/2025 7.5 (L) 12.0 - 15.9 g/dL Final   01/06/2025 7.0 (L) 12.0 - 15.9 g/dL Final   01/06/2025 7.6 (L) 12.0 - 15.9 g/dL Final   01/05/2025 8.0 (L) 12.0 - 15.9 g/dL Final   01/04/2025 8.2 (L) 12.0 - 15.9 g/dL Final      HCT Hematocrit   Date Value Ref Range Status   01/07/2025 26.8 (L) 34.0 - 46.6 % Final   01/06/2025 25.4 (L) 34.0 - 46.6 % Final   01/06/2025 26.1 (L) 34.0 - 46.6 % Final  "  01/06/2025 23.8 (L) 34.0 - 46.6 % Final   01/06/2025 26.3 (L) 34.0 - 46.6 % Final   01/05/2025 27.1 (L) 34.0 - 46.6 % Final   01/04/2025 27.5 (L) 34.0 - 46.6 % Final      Platlets No results found for: \"LABPLAT\"   MCV MCV   Date Value Ref Range Status   01/07/2025 99.6 (H) 79.0 - 97.0 fL Final   01/06/2025 101.2 (H) 79.0 - 97.0 fL Final   01/06/2025 101.2 (H) 79.0 - 97.0 fL Final   01/05/2025 98.2 (H) 79.0 - 97.0 fL Final          Sodium Sodium   Date Value Ref Range Status   01/07/2025 145 136 - 145 mmol/L Final   01/06/2025 142 136 - 145 mmol/L Final   01/05/2025 143 136 - 145 mmol/L Final      Potassium Potassium   Date Value Ref Range Status   01/07/2025 4.2 3.5 - 5.2 mmol/L Final   01/06/2025 4.5 3.5 - 5.2 mmol/L Final   01/05/2025 3.9 3.5 - 5.2 mmol/L Final      Chloride Chloride   Date Value Ref Range Status   01/07/2025 108 (H) 98 - 107 mmol/L Final   01/06/2025 104 98 - 107 mmol/L Final   01/05/2025 106 98 - 107 mmol/L Final      CO2 CO2   Date Value Ref Range Status   01/07/2025 19.0 (L) 22.0 - 29.0 mmol/L Final   01/06/2025 18.0 (L) 22.0 - 29.0 mmol/L Final   01/05/2025 22.0 22.0 - 29.0 mmol/L Final      BUN BUN   Date Value Ref Range Status   01/07/2025 55 (H) 6 - 20 mg/dL Final   01/06/2025 49 (H) 6 - 20 mg/dL Final   01/05/2025 42 (H) 6 - 20 mg/dL Final      Creatinine Creatinine   Date Value Ref Range Status   01/07/2025 3.27 (H) 0.57 - 1.00 mg/dL Final   01/06/2025 2.99 (H) 0.57 - 1.00 mg/dL Final   01/05/2025 2.22 (H) 0.57 - 1.00 mg/dL Final      Calcium Calcium   Date Value Ref Range Status   01/07/2025 8.4 (L) 8.6 - 10.5 mg/dL Final   01/06/2025 8.2 (L) 8.6 - 10.5 mg/dL Final   01/05/2025 8.0 (L) 8.6 - 10.5 mg/dL Final      PO4 No results found for: \"CAPO4\"   Albumin Albumin   Date Value Ref Range Status   01/05/2025 3.2 (L) 3.5 - 5.2 g/dL Final      Magnesium Magnesium   Date Value Ref Range Status   01/07/2025 1.9 1.6 - 2.6 mg/dL Final   01/06/2025 1.8 1.6 - 2.6 mg/dL Final      Uric Acid No " "results found for: \"URICACID\"         Results from last 7 days   Lab Units 01/07/25  0433 01/06/25  1956 01/06/25  1222 01/06/25  0319 01/05/25  0533 01/04/25  1703 01/04/25  0434 01/03/25  0433 01/02/25  1314 01/02/25  0900   SODIUM mmol/L 145  --   --  142 143  --  144 142  142  142   < > 137   POTASSIUM mmol/L 4.2  --   --  4.5 3.9  --  4.3 4.2  4.2  4.2   < > 6.1*   CHLORIDE mmol/L 108*  --   --  104 106  --  110* 105  105  105   < > 100   CO2 mmol/L 19.0*  --   --  18.0* 22.0  --  23.0 21.0*  24.0  24.0   < > 22.6   BUN mg/dL 55*  --   --  49* 42*  --  37* 29*  28*  28*   < > 40*   CREATININE mg/dL 3.27*  --   --  2.99* 2.22*  --  1.99* 1.26*  1.29*  1.29*   < > 2.39*   CALCIUM mg/dL 8.4*  --   --  8.2* 8.0*  --  8.0* 8.9  8.8  8.8   < > 8.1*   ALBUMIN g/dL  --   --   --   --  3.2*  --  3.0* 3.3*  3.3*  --  3.4*   WBC 10*3/mm3 8.54 8.24  --  9.06 9.82  --  8.93 8.23   < > 10.48   HEMOGLOBIN g/dL 7.7* 7.5*  7.5* 7.0* 7.6* 8.0*   < > 6.7* 7.5*   < > 8.4*   PLATELETS 10*3/mm3 176 178  --  167 197  --  195 196   < > 232    < > = values in this interval not displayed.       Intake/Output Summary (Last 24 hours) at 1/7/2025 1343  Last data filed at 1/7/2025 1203  Gross per 24 hour   Intake 4239.92 ml   Output 2195 ml   Net 2044.92 ml         Medication Review: Yes    Assessment & Plan       Acute respiratory failure with hypercapnia    MARIAA (acute kidney injury)    Type 2 diabetes mellitus with hyperglycemia    Sepsis      Assessment & Plan:    1-MARIAA- non oliguric - Pre-renal azotemia vs sepsis related MARIAA. UA - RBC 3-5, + protein. 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 At home on lisinopril, metformin and topiramate. Initially was improving with IV hydration and hemodynamic support but now worsening and UOP has decreased.   2- Hyperkalemia - improved - with lokelma and bicarb infusion  3- Hyperglycemia   4- Anemia   5- Shock   6- hx of Factor V Leiden deficiency   7- hx of DM   8- " Respiratory distress - suspicion of aspiration pneumonia   9- Hx of Gout   10. Nutrition: On high protein feed discussed with dietician overall protein intake is still <1g/kg/day     Plan:  - Uptrend in serum cr and BUN. Hemodynamic variation  vs high protein tube tube feed.   - Continue with aggressive diuresis to achieve net negative fluid balance   - Avoid nephrotoxic agents.   - Renal diet   - Adjust meds per renal function   - No emergent need of RRT   - Monitor H/H and transfuse for Hgb less than 7.0     Discussed with RN and Critical care team  Stanley Velasco MD  01/07/25  13:43 EST

## 2025-01-07 NOTE — PLAN OF CARE
Goal Outcome Evaluation:  Plan of Care Reviewed With: child, significant other           Outcome Evaluation: FiO2 increased to 60% due to low PO2 on ABG.  Pt continues to be febrile, TMAX this shift 100.7F, treated with tylenol and tepid bath, currently 99.9.  UOP exhibited significant increase at 2000, and patient has been averaging roughly 160mL of output per hour since that time.  Insulin drip continues, continues to require insulin drip at high doses between 13 and 16 units per hour, maintaining glucose between 117 and 129.  Norepinephrine infuses at 0.06mcg/kg/min.  Fentanyl and Versed decreased as at midnight pt was unarousable, fentanyl currently infusing at 250 mcg/hr and versed at 9mg/hr and pt is responding to pain.

## 2025-01-07 NOTE — PLAN OF CARE
Goal Outcome Evaluation:      Patient minimally responsive to only pain and suctioning; sedation titrated slowly to fent at 150 mcg/hr and versed at 5 mg/hr. Levo continued. Patient febrile this shift with max temp 101.3; tylenol x2 and ice packs added. Insulin gtt discontinued. Heparin gtt increased and bolus given. Bumex 2mg x1; UOP 1750 ml's. Still no BM; miralax and pericolace given. Family updated by phone.

## 2025-01-07 NOTE — PAYOR COMM NOTE
"ArzateMago (39 y.o. Female)       Date of Birth   1985    Social Security Number       Address   [address] [place], [place] [address]    Home Phone   [phone]    MRN   [id number]       [facility]    Marital Status                               Admission Date   1/2/25    Admission Type   Urgent    Admitting Provider   Saji De Jesus MD    Attending Provider   Saji De Jesus MD    Department, Room/Bed   [facility] 2A ICU, N219/1       Discharge Date       Discharge Disposition       Discharge Destination                                 Attending Provider: Saji De Jesus MD    Allergies: Salvia Officinalis, Shellfish Allergy, Toradol [Ketorolac Tromethamine], Tree Nut, Haldol [Haloperidol], Tramadol, Amoxicillin, Penicillins    Isolation: None   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)   Code Status: CPR    Ht: 162.6 cm (64.02\")   Wt: 159 kg (350 lb 11.2 oz)    Admission Cmt: None   Principal Problem: Acute respiratory failure with hypercapnia [J96.02]                   Active Insurance as of 1/2/2025       Primary Coverage       Payor Plan Insurance Group Employer/Plan Group    [place]        Payor Plan Address Payor Plan Phone Number Payor Plan Fax Number Effective Dates    [address]   4/1/2016 - None Entered    [facility] [id number]         Subscriber Name Subscriber Birth Date Member ID       MAGO ARZATE 1985 [id number]                     Emergency Contacts        (Rel.) Home Phone Work Phone Mobile Phone    Faina Arzate (Mother) [phone] -- --    Milan Arzate (Son) [phone] -- [phone]              [place]: NPI [id number] Tax ID [id number]  Insurance Information                  [employer] Phone: --    Subscriber: Mago Arzate Subscriber#: [id number]    Group#: -- Precert#: --    Authorization#: [id number] " Effective Date: --             History & Physical        Roly Garcia MD at 01/03/25 0947          Pulmonary/Critical Care ICU note     LOS: 1 day   Patient Care Team:  Bladimir Calle PA-C as PCP - General (Physician Assistant)    Chief Complaint: AMS    Subjective    History reviewed and updated in EMR as indicated.    Interval history:    Patient remains on mechanical ventilator.  She is requiring high levels of insulin infusion for blood sugars.  She is sedated.  She has copious secretions.    History taken from: record    Past Medical History/Family History/Social History were reviewed by me and updates were made.     Review of Systems:    Review of 14 systems was completed with positives and pertinent negatives noted in the subjective section.  All other systems reviewed and are negative.         Objective    Vital Signs  Temp:  [98.4 °F (36.9 °C)-103.1 °F (39.5 °C)] 100.2 °F (37.9 °C)  Heart Rate:  [] 100  Resp:  [20-27] 23  BP: ()/(39-85) 128/63  Arterial Line BP: ()/(41-85) 111/50  FiO2 (%):  [60 %-100 %] 60 %  There is no height or weight on file to calculate BMI.  Mode: VC+/AC  FiO2 (%):  [60 %-100 %] 60 %  S RR:  [16-22] 22  S VT:  [385 mL-450 mL] 385 mL  PEEP/CPAP (cm H2O):  [5 cm H20-14 cm H20] 14 cm H20  MAP (cm H2O):  [18-21] 20    Intake/Output Summary (Last 24 hours) at 1/3/2025 0949  Last data filed at 1/3/2025 0848  Gross per 24 hour   Intake 3881.92 ml   Output 3560 ml   Net 321.92 ml      Physical Exam:     General Appearance:    Alert, cooperative, in no acute distress   Head:    Normocephalic, without obvious abnormality, atraumatic   Eyes:            Lids and lashes normal   ENMT:   Ears appear intact with no abnormalities noted    Oral endotracheal tube in place.   Neck:   No adenopathy, supple, trachea midline, no thyromegaly,      no carotid bruit, no JVD   Lungs/resp:   On ventilator, symmetric chest rise, no crepitus, bilateral rhonchi, no chest wall  tenderness                  Heart/CV:    Regular rhythm and normal rate, normal S1 and S2, no         murmur   Abdomen/GI:   Obese, nontender, nondistended   G/U:     Deferred   Extremities/MSK:   No clubbing, cyanosis or edema.  Normal tone.  No deformities.    Pulses:   Pulses palpable and equal bilaterally   Skin:   No bleeding, bruising or rash   Hem/Lymph:   No cervical or supraclavicular adenopathy.    Neurologic:   Moves all extremities with no obvious focal motor deficit.  Cranial nerves 2 - 12 grossly intact            Psychiatric: Sedated.     The above findings are documentation of my personal physical exam findings from today.   Electronically signed by:  Roly Garcia MD  01/03/25  09:49 EST     Results Review:     I reviewed the patient's new clinical results.   Results from last 7 days   Lab Units 01/03/25 0433 01/03/25  0018 01/02/25 2021 01/02/25  1314 01/02/25  0900 01/02/25  0453   SODIUM mmol/L 142  142 143 134*   < > 137 136   POTASSIUM mmol/L 4.2  4.2 4.4 4.7   < > 6.1* 7.0*   CHLORIDE mmol/L 105  105 105 98   < > 100 101   CO2 mmol/L 24.0  24.0 27.0 22.0   < > 22.6 22.3   BUN mg/dL 28*  28* 31* 34*   < > 40* 41*   CREATININE mg/dL 1.29*  1.29* 1.25* 1.38*   < > 2.39* 2.50*   CALCIUM mg/dL 8.8  8.8 9.0 8.7   < > 8.1* 8.4*   BILIRUBIN mg/dL 0.3  --   --   --  0.3 0.4   ALK PHOS U/L 145*  --   --   --  137* 142*   ALT (SGPT) U/L 54*  --   --   --  71* 71*   AST (SGOT) U/L 63*  --   --   --  119* 136*   GLUCOSE mg/dL 147*  147* 168* 234*   < > 315* 314*    < > = values in this interval not displayed.     Results from last 7 days   Lab Units 01/03/25  0433 01/02/25  1314 01/02/25  0900   WBC 10*3/mm3 8.23 9.71 10.48   HEMOGLOBIN g/dL 7.5* 8.5* 8.4*   HEMATOCRIT % 25.2* 28.0* 28.1*   PLATELETS 10*3/mm3 196 222 232     Results from last 7 days   Lab Units 01/03/25  0425   PH, ARTERIAL pH units 7.291*   PO2 ART mm Hg 69.1*   PCO2, ARTERIAL mm Hg 55.6*   HCO3 ART mmol/L 26.8*       I  reviewed the patient's new imaging including images and reports.    Chest x-ray 1/3/2025 shows cardiomegaly with left greater than right bilateral pulmonary infiltrates/possible left effusion with endotracheal tube in place and right internal jugular catheter.    CT chest 1/2/2025 shows bibasilar airspace opacities and cardiomegaly.  There is a small left pleural effusion.    CT abdomen and pelvis shows enlarged liver.    Radiologist impression follows:    IMPRESSION:     CT CHEST:  BIBASILAR PROBABLE PNEUMONIA AND ATELECTASIS WITH SMALL LEFT PLEURAL  EFFUSION.     CT ABDOMEN AND PELVIS:  SEVERELY ENLARGED LIVER WITH FATTY INFILTRATION.  STATUS POST LEFT NEPHRECTOMY.    CT head 1/2/2025:    IMPRESSION:     NO ACUTE INTRACRANIAL ABNORMALITY.       Medication Review:   cefepime, 2,000 mg, Intravenous, Q12H  chlorhexidine, 15 mL, Mouth/Throat, Q12H  famotidine, 20 mg, Intravenous, BID  metroNIDAZOLE, 500 mg, Intravenous, Q8H  mupirocin, 1 Application, Each Nare, BID  senna-docusate sodium, 2 tablet, Nasogastric, BID  sodium chloride, 10 mL, Intravenous, Q12H  sodium zirconium cyclosilicate, 10 g, Nasogastric, TID  vancomycin, 1,250 mg, Intravenous, Q12H      amiodarone, 0.5 mg/min, Last Rate: 0.5 mg/min (01/03/25 0021)  fentanyl 10 mcg/mL,  mcg/hr, Last Rate: 100 mcg/hr (01/02/25 1808)  heparin, 17 Units/kg/hr, Last Rate: 17 Units/kg/hr (01/03/25 0620)  insulin, 0-100 Units/hr, Last Rate: 18 Units/hr (01/03/25 0944)  Midazolam 1 mg/mL 100mL NS, 1-10 mg/hr, Last Rate: 10 mg/hr (01/03/25 0155)  norepinephrine, 0.02-0.3 mcg/kg/min, Last Rate: 0.02 mcg/kg/min (01/03/25 0715)  Pharmacy to Dose Heparin,   Pharmacy to dose vancomycin,   sodium chloride, 100 mL/hr, Last Rate: 100 mL/hr (01/03/25 0830)        Assessment & Plan    Active Hospital Problems    Diagnosis     **Acute respiratory failure with hypercapnia     Hyperkalemia     Sepsis     Respiratory acidosis with metabolic acidosis     Acute respiratory failure      Type 2 diabetes mellitus with hyperglycemia     MARIAA (acute kidney injury)      39 y.o. female with history of HLD, hypothyroidism, atrial fibrillation, history of PE/DVT, factor V Leiden mutation, stroke, presented to [facility] with AMS and difficult to arouse.  Evaluation there showed significant renal failure, hyperglycemia, hypercapnic respiratory failure, and basilar pulmonary infiltrates concerning for pneumonia.  She was intubated for respiratory failure and placed on pressors for hypotension.  She was treated with shifting agents for hyperkalemia.  She was given Narcan without significant response.  She was subsequently transferred to [facility] ICU for ongoing management.    Patient continues to have significant secretions.  She is not yet appropriate for formal ventilator weaning.  Continue antibiotics.    Plan:  1.  For acute mixed hypoxemic and hypercapnic respiratory failure: Continue mechanical ventilation.  Adjust settings to optimize ventilation and oxygenation and minimize barotrauma.  2.  For pulmonary infiltrates/basilar pneumonia unknown organism: Worrisome for aspiration.  Follow-up cultures.  Broad-spectrum antibiotics with cefepime plus Flagyl and vancomycin.  3.  For MARIAA: Improving.  Nephrology following.  Avoid nephrotoxins when possible and renally dose medications when appropriate.  4.  For hyperkalemia: Status post shifting agents.  Improved.  Monitor.  5.  For poorly controlled T2DM: Glucose monitoring and insulin drip protocol.  6.  For sepsis: Broad-spectrum antibiotics as above.  Pressors if needed.  Repeat lactic acid level.  7.  For history of atrial fibrillation/DVT/PE/factor V Leiden: Heparin drip for now but if hemoglobin stable transition to home Eliquis.  Hemoglobin dropped a bit.  8.  For chronic anemia: Monitor.  Transfuse if necessary.    Patient is critically ill secondary multisystem organ failure and has high risk of life-threatening decline in condition.  As such, she  requires continuous monitoring frequent reassessment for consideration of adjustment management in order to minimize that risk.  I personally reassessed her multiple times today.    Critical care time : 38 minutes spent by me personally.(This excludes time spent performing separately reportable procedures and services). Critical care time includes high complexity decision making to assess, manipulate, and support vital organ system failure in this individual who has impairment of one or more vital organ systems such that there is a high probability of imminent or life threatening deterioration in the patient’s condition.    Electronically signed by:  Roly Garcia MD  01/03/25  09:49 EST        Please note that portions of this note were completed with [employer] - a voice recognition program.     Electronically signed by Roly Garcia MD at 01/03/25 1840       Roly Garcia MD at 01/02/25 0832          Pulmonary/Critical Care History and Physical Exam     LOS: 0 days   Patient Care Team:  Bladimir Calle PA-C as PCP - General (Physician Assistant)    Chief Complaint: AMS    Subjective    HPI:     Ms. Arzate is a 39 year old female with a pmhx of Afib, diabetes, h/o PE/DVT, Factor 5 Leiden mutation, GERD, HLD, h/o stroke and hypothyroidism who presented to [facility] with altered mental status and being difficult to arouse per family. Upon arrival to [facility], workup was notable for: negative respiratory panel, leukocytosis (WBC 13.32). ProBNP 5671, hgb 10.2-->8.4, lactate 2.9,  potassium 8.2, creatinine 2.85, BUN 40, glucose 481, and Procal 6.26. CT head without acute process. CT chest with small left pleural effusion, bibasliar consolidation and atelectasis. CT A/P without contrast showed severe enlargement of the liver, s/p left nephrectomy. Initial ABG showed pH 7.048, pCO2 75, pO2 54, HCO3 21. Patient was intubated. Repeat ABG without significant improvement (pH 7.018, pCO2 79.3, pO2  83.6, HCO3 20.4). Nephrology was consulted and ordered for patient to receive 20 units of Regular insulin IVP x 2 and initiation of an insulin gtt with improvement of potassium to 6.1. Patient also became hypotensive and required Levophed gtt. An UDS was ordered after admission and was positive for opioids. She was given two doses of Narcan on two separate occasions without a significant change in mentation. The decision was made to transfer the patient to our facility for ICU admission and higher level of care.    Time spent: 8 minutes. Electronically signed by YEIMI Aragon, 25, 11:12 AM EST.    Patient seen on arrival to the ICU.  She is unresponsive on mechanical ventilation and is unable to relay any history.  History per outside records as above.    History taken from: record    Past Medical History:   Diagnosis Date    A-fib     Abnormal ECG     Anemia     Anxiety     Asthma     Cancer     Ovarian    Depression     Diabetes mellitus     DVT (deep venous thrombosis)     Factor 5 Leiden mutation, heterozygous     Fibroid     GERD (gastroesophageal reflux disease)     Gout     H/O abdominal abscess     History of sepsis     History of transfusion     Hyperlipidemia     Hypothyroid     Kidney stone     Migraines     Neuropathy     Ovarian cancer 2021    Ovarian cyst     PE (pulmonary embolism)     Polycystic ovary syndrome     Preeclampsia     Rh incompatibility     Stroke     TIA (transient ischemic attack)     Urinary tract infection     Varicella        Past Surgical History:   Procedure Laterality Date    ABDOMINAL SURGERY      CARDIAC CATHETERIZATION       SECTION      CHOLECYSTECTOMY      COLONOSCOPY      ENDOSCOPY      EXTRACORPOREAL SHOCK WAVE LITHOTRIPSY (ESWL) Left 10/22/2021    Procedure: EXTRACORPOREAL SHOCKWAVE LITHOTRIPSY;  Surgeon: Milan Motley MD;  Location: [facility];  Service: Urology;  Laterality: Left;    HYSTERECTOMY  2017    ovarian ca    LITHOTRIPSY  "     NEPHRECTOMY Left 10/2022    RIGHT OOPHORECTOMY      URETEROSCOPY LASER LITHOTRIPSY WITH STENT INSERTION Left 10/01/2021    Procedure: URETEROSCOPY WITH STENT PLACEMENT;  Surgeon: Milan Motley MD;  Location: [facility];  Service: Urology;  Laterality: Left;    URETEROSCOPY LASER LITHOTRIPSY WITH STENT INSERTION Left 04/27/2022    Procedure: URETEROSCOPY STENT REMOVAL;  Surgeon: Milan Motley MD;  Location: [facility] OR;  Service: Urology;  Laterality: Left;    URETEROSCOPY LASER LITHOTRIPSY WITH STENT INSERTION Left 06/29/2022    Procedure: CYSTOSCOPY RETROGRADE LEFT WITH STENT PLACEMENT;  Surgeon: Milan Motley MD;  Location: [facility] OR;  Service: Urology;  Laterality: Left;       Family History   Problem Relation Age of Onset    Hypertension Mother     Diabetes Father     Hypertension Father     Heart attack Father     No Known Problems Sister     No Known Problems Brother     No Known Problems Daughter     No Known Problems Son     No Known Problems Maternal Grandmother     No Known Problems Paternal Grandmother     Breast cancer Paternal Aunt     No Known Problems Maternal Grandfather     No Known Problems Paternal Grandfather     No Known Problems Cousin     Rheum arthritis Neg Hx     Osteoarthritis Neg Hx     Asthma Neg Hx     Heart failure Neg Hx     Hyperlipidemia Neg Hx     Migraines Neg Hx     Rashes / Skin problems Neg Hx     Seizures Neg Hx     Stroke Neg Hx     Thyroid disease Neg Hx        Social History     Socioeconomic History    Marital status:    Tobacco Use    Smoking status: Never     Passive exposure: Never    Smokeless tobacco: Never   Vaping Use    Vaping status: Never Used   Substance and Sexual Activity    Alcohol use: No    Drug use: Never     Comment: Pt has track marks on right arm. Pt states \"It's from my INR being drawn.\"     Sexual activity: Not Currently     Partners: Male     Birth control/protection: None       Allergies   Allergen Reactions    " Salvia Officinalis Itching, Other (See Comments) and Shortness Of Breath    Shellfish Allergy Anaphylaxis    Toradol [Ketorolac Tromethamine] Anaphylaxis and Hives    Tree Nut Anaphylaxis and Shortness Of Breath     WALNUTS    Haldol [Haloperidol] Hives and Mental Status Change    Tramadol Hives and Swelling    Amoxicillin Hives and Rash    Penicillins Hives and Rash       Past Medical History/Family History/Social History were reviewed by me and updates were made.     Review of Systems:    Review of 14 systems was completed with positives and pertinent negatives noted in the subjective section.  All other systems reviewed and are negative.         Objective    Vital Signs  Temp:  [98.2 °F (36.8 °C)-104 °F (40 °C)] 100.1 °F (37.8 °C)  Heart Rate:  [] 108  Resp:  [20-35] 22  BP: ()/(38-88) 121/51  Arterial Line BP: (101-149)/(52-85) 103/63  FiO2 (%):  [70 %-100 %] 70 %  There is no height or weight on file to calculate BMI.  Mode: VC+/AC  FiO2 (%):  [70 %-100 %] 70 %  S RR:  [16-26] 22  S VT:  [350 mL-450 mL] 385 mL  PEEP/CPAP (cm H2O):  [5 cm H20-14 cm H20] 14 cm H20  MAP (cm H2O):  [10-20] 18    Intake/Output Summary (Last 24 hours) at 1/2/2025 1953  Last data filed at 1/2/2025 1808  Gross per 24 hour   Intake 1017.42 ml   Output 1700 ml   Net -682.58 ml      Physical Exam:     General Appearance:  Sedated on ventilator, in no acute distress   Head:    Normocephalic, without obvious abnormality, atraumatic   Eyes:            Lids and lashes normal, conjunctivae and sclerae normal,    no icterus, no pallor, corneas clear, PERRL   ENMT:   Ears appear intact with no abnormalities noted    Oral endotracheal tube in place   Neck:   No adenopathy, supple, trachea midline, no thyromegaly,      no carotid bruit, no JVD   Lungs/resp:   On ventilator, symmetric chest rise, no crepitus, bilateral rhonchi, no chest wall tenderness                  Heart/CV:    Regular rhythm and normal rate, normal S1 and S2, no          murmur   Abdomen/GI:   Obese, soft, nontender, nondistended   G/U:     Deferred   Extremities/MSK:   No clubbing, cyanosis or edema.   No deformities.    Pulses:   Pulses palpable and equal bilaterally   Skin:   No bleeding, bruising or rash   Hem/Lymph:   No cervical or supraclavicular adenopathy.    Neurologic: Sedated.            Psychiatric: Sedated.     The above findings are documentation of my personal physical exam findings from today.   Electronically signed by:  Roly Garcia MD  01/02/25  19:53 EST     Results Review:     I reviewed the patient's new clinical results.   Results from last 7 days   Lab Units 01/02/25  1538 01/02/25  1314 01/02/25  0900 01/02/25  0453 01/01/25  2333 01/01/25  2145   SODIUM mmol/L 137 136 137 136   < > 133*   POTASSIUM mmol/L 5.7* 6.1* 6.1* 7.0*   < > 8.2*   CHLORIDE mmol/L 100 100 100 101   < > 98   CO2 mmol/L 22.0 21.0* 22.6 22.3   < > 19.5*   BUN mg/dL 37* 39* 40* 41*   < > 40*   CREATININE mg/dL 1.61* 1.69* 2.39* 2.50*   < > 2.85*   CALCIUM mg/dL 8.7 8.8 8.1* 8.4*   < > 8.3*   BILIRUBIN mg/dL  --   --  0.3 0.4  --  0.6   ALK PHOS U/L  --   --  137* 142*  --  129*   ALT (SGPT) U/L  --   --  71* 71*  --  60*   AST (SGOT) U/L  --   --  119* 136*  --  120*   GLUCOSE mg/dL 373* 377* 315* 314*   < > 481*    < > = values in this interval not displayed.     Results from last 7 days   Lab Units 01/02/25  1314 01/02/25  0900 01/01/25  2039   WBC 10*3/mm3 9.71 10.48 13.32*   HEMOGLOBIN g/dL 8.5* 8.4* 9.3*   HEMATOCRIT % 28.0* 28.1* 31.2*   PLATELETS 10*3/mm3 222 232 252     Results from last 7 days   Lab Units 01/02/25  1514   PH, ARTERIAL pH units 7.221*   PO2 ART mm Hg 98.1   PCO2, ARTERIAL mm Hg 59.8*   HCO3 ART mmol/L 24.5       I reviewed the patient's new imaging including images and reports.    CT chest 1/2/2025 shows bibasilar airspace opacities and cardiomegaly.  There is a small left pleural effusion.    CT abdomen and pelvis shows enlarged liver.    Radiologist  impression follows:    IMPRESSION:     CT CHEST:  BIBASILAR PROBABLE PNEUMONIA AND ATELECTASIS WITH SMALL LEFT PLEURAL  EFFUSION.     CT ABDOMEN AND PELVIS:  SEVERELY ENLARGED LIVER WITH FATTY INFILTRATION.  STATUS POST LEFT NEPHRECTOMY.    CT head 1/2/2025:    IMPRESSION:     NO ACUTE INTRACRANIAL ABNORMALITY.       Medication Review:   albumin human, 25 g, Intravenous, BID  cefepime, 2,000 mg, Intravenous, Q12H  chlorhexidine, 15 mL, Mouth/Throat, Q12H  famotidine, 20 mg, Intravenous, BID  metroNIDAZOLE, 500 mg, Intravenous, Q8H  mupirocin, 1 Application, Each Nare, BID  senna-docusate sodium, 2 tablet, Nasogastric, BID  sodium chloride, 10 mL, Intravenous, Q12H  sodium zirconium cyclosilicate, 10 g, Nasogastric, TID  [START ON 1/3/2025] vancomycin (dosing per levels), , Not Applicable, Daily      amiodarone, 1 mg/min, Last Rate: 1 mg/min (01/02/25 1731)   Followed by  amiodarone, 0.5 mg/min  fentanyl 10 mcg/mL,  mcg/hr, Last Rate: 100 mcg/hr (01/02/25 1808)  heparin, 9 Units/kg/hr, Last Rate: 9 Units/kg/hr (01/02/25 1543)  insulin, 0-100 Units/hr, Last Rate: 11 Units/hr (01/02/25 1904)  Midazolam 1 mg/mL 100mL NS, 1-10 mg/hr, Last Rate: 10 mg/hr (01/02/25 1705)  norepinephrine, 0.02-0.3 mcg/kg/min, Last Rate: 0.06 mcg/kg/min (01/02/25 1730)  Pharmacy to Dose Heparin,   Pharmacy to dose vancomycin,         Assessment & Plan    Active Hospital Problems    Diagnosis     **Acute respiratory failure with hypercapnia     Hyperkalemia     Sepsis     Respiratory acidosis with metabolic acidosis     Acute respiratory failure     Type 2 diabetes mellitus with hyperglycemia     MARIAA (acute kidney injury)      39 y.o. female with history of HLD, hypothyroidism, atrial fibrillation, history of PE/DVT, factor V Leiden mutation, stroke, presented to [facility] with AMS and difficult to arouse.  Evaluation there showed significant renal failure, hyperglycemia, hypercapnic respiratory failure, and basilar pulmonary  infiltrates concerning for pneumonia.  She was intubated for respiratory failure and placed on pressors for hypotension.  She was treated with shifting agents for hyperkalemia.  She was given Narcan without significant response.  She was subsequently transferred to [facility] ICU for ongoing management.    Plan:  1.  For acute mixed hypoxemic and hypercapnic respiratory failure: Continue mechanical ventilation.  Adjust settings to optimize ventilation and oxygenation and minimize barotrauma.  2.  For pulmonary infiltrates/basilar pneumonia unknown organism: Worrisome for aspiration.  Follow-up cultures.  Broad-spectrum antibiotics with cefepime plus Flagyl and vancomycin.  3.  For MARIAA: Consult nephrology.  Fluid resuscitation.  May need HD.  4.  For hyperkalemia: Status post shifting agents.  Monitor with resuscitation.  5.  For poorly controlled T2DM: Glucose monitoring and insulin drip protocol.  6.  For sepsis: Broad-spectrum antibiotics as above.  Pressors if needed.  Repeat lactic acid level.  7.  For history of atrial fibrillation/DVT/PE/factor V Leiden: Heparin drip for now but if hemoglobin stable transition to home Eliquis.  8.  For chronic anemia: Monitor.  Transfuse if necessary.    Patient is critically ill secondary multisystem organ failure and has high risk of life-threatening decline in condition.  As such, she requires continuous monitoring frequent reassessment for consideration of adjustment management in order to minimize that risk.  I personally reassessed her multiple times today.    Critical care time : 44 minutes spent by me personally.(This excludes time spent performing separately reportable procedures and services). Critical care time includes high complexity decision making to assess, manipulate, and support vital organ system failure in this individual who has impairment of one or more vital organ systems such that there is a high probability of imminent or life threatening deterioration in the  patient’s condition.    Electronically signed by:  Roly Garcia MD  01/02/25  19:53 EST        Please note that portions of this note were completed with [employer] - a voice recognition program.     Electronically signed by Roly Garcia MD at 01/03/25 1843       Current Facility-Administered Medications   Medication Dose Route Frequency Provider Last Rate Last Admin    acetaminophen (TYLENOL) 160 MG/5ML oral solution 650 mg  650 mg Nasogastric Q8H PRN Case, Tiffanie V., DO   650 mg at 01/06/25 2212    ampicillin-sulbactam (UNASYN) 3 g in sodium chloride 0.9 % 100 mL MBP  3 g Intravenous Q12H Saji De Jesus MD   3 g at 01/07/25 0242    sennosides-docusate (PERICOLACE) 8.6-50 MG per tablet 2 tablet  2 tablet Nasogastric BID Alana Crumpice B, APRN   2 tablet at 01/07/25 0802    And    polyethylene glycol (MIRALAX) packet 17 g  17 g Nasogastric Daily PRN Alana Crumpice B, APRN   17 g at 01/07/25 0801    And    bisacodyl (DULCOLAX) suppository 10 mg  10 mg Rectal Daily PRN Beronica Crump B, APRN        chlorhexidine (PERIDEX) 0.12 % solution 15 mL  15 mL Mouth/Throat Q12H Xochitl-Yue Beronica B, APRN   15 mL at 01/07/25 0801    dextrose (D50W) (25 g/50 mL) IV injection 10-50 mL  10-50 mL Intravenous Q15 Min PRN Alana Crumpice B, APRN        diphenhydrAMINE (BENADRYL) injection 50 mg  50 mg Intravenous PRN Bettie Walter, APRN        EPINEPHrine (ADRENALIN) injection 0.3 mg  0.3 mg Intravenous Q5 Min PRN Bettie Walter, APRADAM        EPINEPHrine PF (ADRENALIN) injection 0.3 mg  0.3 mg Intramuscular Q5 Min PRN Bettie Walter, APRADAM        famotidine (PEPCID) injection 20 mg  20 mg Intravenous BID Moustaphas-Alana Turnerice B, APRN   20 mg at 01/07/25 0801    fentaNYL (SUBLIMAZE) bolus from bag 10 mcg/mL injection 50 mcg  50 mcg Intravenous Q30 Min PRN Beronica Crump APRN   50 mcg at 01/04/25 0544    fentaNYL 2500 mcg/250 mL NS infusion   mcg/hr Intravenous  Titrated Beronica Crump APRN 20 mL/hr at 01/07/25 0810 200 mcg/hr at 01/07/25 0810    glucagon (GLUCAGEN) injection 1 mg  1 mg Intramuscular Q15 Min PRN Beronica Crump APRN        heparin 18161 units/250 mL (100 units/mL) in 0.45 % NaCl infusion  17 Units/kg/hr Intravenous Titrated Bakari Dumont PharmD 27 mL/hr at 01/07/25 0606 17 Units/kg/hr at 01/07/25 0606    insulin regular 1 unit/mL in 0.9% sodium chloride (Glucommander)  0-100 Units/hr Intravenous Titrated Beronica Crump APRN 12.1 mL/hr at 01/07/25 0808 12.1 Units/hr at 01/07/25 0808    ipratropium (ATROVENT) nebulizer solution 0.5 mg  0.5 mg Nebulization Q4H PRN Bettie Walter APRN        ipratropium-albuterol (DUO-NEB) nebulizer solution 3 mL  3 mL Nebulization Q4H PRN Bettie Walter APRN        methylPREDNISolone sodium succinate (SOLU-Medrol) injection 125 mg  125 mg Intravenous PRN Bettie Walter APRN        midazolam (VERSED) 100 mg in 100mL NS infusion  1-10 mg/hr Intravenous Titrated Beronica Crump APRN 8 mL/hr at 01/07/25 0527 8 mg/hr at 01/07/25 0527    norepinephrine (LEVOPHED) 8 mg in 250 mL NS infusion (premix)  0.02-0.3 mcg/kg/min Intravenous Titrated Beronica Crump APRN 8.94 mL/hr at 01/07/25 0845 0.03 mcg/kg/min at 01/07/25 0845    nystatin (MYCOSTATIN) powder   Topical Q12H Bettie Walter APRN   Given at 01/07/25 0801    Pharmacy to Dose Heparin   Not Applicable Continuous PRN Bettie Walter APRN        Pharmacy to dose vancomycin   Not Applicable Continuous PRN David Lazaro PharmD        sodium chloride 0.9 % flush 10 mL  10 mL Intravenous PRN Beronica Crump, APRN        vancomycin (dosing per levels)   Not Applicable Daily Rocky Brewster, [facility]         Lab Results (last 24 hours)       Procedure Component Value Units Date/Time    POC Glucose Once [[id number]]  (Normal) Collected: 01/07/25 0803    Specimen: Blood Updated: 01/07/25 0804     Glucose 112 mg/dL     POC  Glucose Once [[id number]]  (Normal) Collected: 01/07/25 0603    Specimen: Blood Updated: 01/07/25 0604     Glucose 118 mg/dL     Vancomycin, Random [[id number]]  (Normal) Collected: 01/07/25 0433    Specimen: Blood Updated: 01/07/25 0509     Vancomycin Random 21.70 mcg/mL     Narrative:      Therapeutic Ranges for Vancomycin    Vancomycin Random   5.0-40.0 mcg/mL  Vancomycin Trough   5.0-20.0 mcg/mL  Vancomycin Peak     20.0-40.0 mcg/mL    Basic Metabolic Panel [[id number]]  (Abnormal) Collected: 01/07/25 0433    Specimen: Blood Updated: 01/07/25 0509     Glucose 119 mg/dL      BUN 55 mg/dL      Creatinine 3.27 mg/dL      Sodium 145 mmol/L      Potassium 4.2 mmol/L      Chloride 108 mmol/L      CO2 19.0 mmol/L      Calcium 8.4 mg/dL      BUN/Creatinine Ratio 16.8     Anion Gap 18.0 mmol/L      eGFR 17.9 mL/min/1.73     Narrative:      GFR Categories in Chronic Kidney Disease (CKD)      GFR Category          GFR (mL/min/1.73)    Interpretation  G1                     90 or greater         Normal or high (1)  G2                      60-89                Mild decrease (1)  G3a                   45-59                Mild to moderate decrease  G3b                   30-44                Moderate to severe decrease  G4                    15-29                Severe decrease  G5                    14 or less           Kidney failure          (1)In the absence of evidence of kidney disease, neither GFR category G1 or G2 fulfill the criteria for CKD.    eGFR calculation 2021 CKD-EPI creatinine equation, which does not include race as a factor    Magnesium [[id number]]  (Normal) Collected: 01/07/25 0433    Specimen: Blood Updated: 01/07/25 0509     Magnesium 1.9 mg/dL     Heparin Anti-Xa [[id number]]  (Abnormal) Collected: 01/07/25 0433    Specimen: Blood Updated: 01/07/25 0505     Heparin Anti-Xa (UFH) 0.10 IU/ml     CBC & Differential [[id number]]  (Abnormal) Collected: 01/07/25 0433    Specimen: Blood Updated: 01/07/25 0448     Narrative:      The following orders were created for panel order CBC & Differential.  Procedure                               Abnormality         Status                     ---------                               -----------         ------                     CBC Auto Differential[[id number]]        Abnormal            Final result               Scan Slide[[id number]]                                                                    Please view results for these tests on the individual orders.    CBC Auto Differential [[id number]]  (Abnormal) Collected: 01/07/25 0433    Specimen: Blood Updated: 01/07/25 0448     WBC 8.54 10*3/mm3      RBC 2.69 10*6/mm3      Hemoglobin 7.7 g/dL      Hematocrit 26.8 %      MCV 99.6 fL      MCH 28.6 pg      MCHC 28.7 g/dL      RDW 18.5 %      RDW-SD 66.5 fl      MPV 9.7 fL      Platelets 176 10*3/mm3      Neutrophil % 66.0 %      Lymphocyte % 17.6 %      Monocyte % 11.5 %      Eosinophil % 2.8 %      Basophil % 0.5 %      Immature Grans % 1.6 %      Neutrophils, Absolute 5.64 10*3/mm3      Lymphocytes, Absolute 1.50 10*3/mm3      Monocytes, Absolute 0.98 10*3/mm3      Eosinophils, Absolute 0.24 10*3/mm3      Basophils, Absolute 0.04 10*3/mm3      Immature Grans, Absolute 0.14 10*3/mm3      nRBC 0.5 /100 WBC     POC Glucose Once [[id number]]  (Normal) Collected: 01/07/25 0354    Specimen: Blood Updated: 01/07/25 0355     Glucose 127 mg/dL     Blood Gas, Arterial With Co-Ox [[id number]]  (Abnormal) Collected: 01/07/25 0329    Specimen: Arterial Blood Updated: 01/07/25 0329     Site Arterial Line     Apolinar's Test N/A     pH, Arterial 7.204 pH units      Comment: 85 Value below critical limit        pCO2, Arterial 50.8 mm Hg      Comment: 83 Value above reference range        pO2, Arterial 82.1 mm Hg      Comment: 84 Value below reference range        HCO3, Arterial 20.0 mmol/L      Base Excess, Arterial -7.5 mmol/L      Hemoglobin, Blood Gas 7.9 g/dL      Comment: 84 Value below reference  range        Hematocrit, Blood Gas 24.3 %      Oxyhemoglobin 93.9 %      Comment: 84 Value below reference range        Methemoglobin 0.30 %      Carboxyhemoglobin 1.8 %      CO2 Content 21.6 mmol/L      Temperature 37.0     Barometric Pressure for Blood Gas --     Comment: N/A        Modality Ventilator     FIO2 60 %      Ventilator Mode VC+/AC     Set Tidal Volume 0.38     Rate 24 Breaths/minute      PEEP 14.0     PIP 0 cmH2O      Comment: Meter: [id number]     :  [id number]        IPAP 0     EPAP 0     Notified YEIMI Morales     Notified By [id number]     Notified Time 01/07/2025 03:28     pH, Temp Corrected 7.204 pH Units      pCO2, Temperature Corrected 50.8 mm Hg      pO2, Temperature Corrected 82.1 mm Hg     POC Glucose Once [[id number]]  (Normal) Collected: 01/07/25 0204    Specimen: Blood Updated: 01/07/25 0211     Glucose 119 mg/dL     POC Glucose Once [[id number]]  (Normal) Collected: 01/07/25 0011    Specimen: Blood Updated: 01/07/25 0012     Glucose 129 mg/dL     POC Glucose Once [[id number]]  (Normal) Collected: 01/06/25 2214    Specimen: Blood Updated: 01/06/25 2215     Glucose 115 mg/dL     CBC & Differential [[id number]]  (Abnormal) Collected: 01/06/25 1956    Specimen: Blood Updated: 01/06/25 2207    Narrative:      The following orders were created for panel order CBC & Differential.  Procedure                               Abnormality         Status                     ---------                               -----------         ------                     CBC Auto Differential[[id number]]        Abnormal            Final result               Scan Slide[[id number]]                                                                    Please view results for these tests on the individual orders.    CBC Auto Differential [[id number]]  (Abnormal) Collected: 01/06/25 1956    Specimen: Blood Updated: 01/06/25 2207     WBC 8.24 10*3/mm3      RBC 2.58 10*6/mm3      Hemoglobin 7.5 g/dL      Hematocrit  26.1 %      .2 fL      MCH 29.1 pg      MCHC 28.7 g/dL      RDW 18.5 %      RDW-SD 68.7 fl      MPV 9.8 fL      Platelets 178 10*3/mm3      Neutrophil % 68.2 %      Lymphocyte % 16.3 %      Monocyte % 10.4 %      Eosinophil % 2.9 %      Basophil % 0.5 %      Immature Grans % 1.7 %      Neutrophils, Absolute 5.62 10*3/mm3      Lymphocytes, Absolute 1.34 10*3/mm3      Monocytes, Absolute 0.86 10*3/mm3      Eosinophils, Absolute 0.24 10*3/mm3      Basophils, Absolute 0.04 10*3/mm3      Immature Grans, Absolute 0.14 10*3/mm3      nRBC 0.7 /100 WBC     Heparin Anti-Xa [[id number]]  (Abnormal) Collected: 01/06/25 1956    Specimen: Blood Updated: 01/06/25 2029     Heparin Anti-Xa (UFH) 0.10 IU/ml     Hemoglobin & Hematocrit, Blood [[id number]]  (Abnormal) Collected: 01/06/25 1956    Specimen: Blood Updated: 01/06/25 2017     Hemoglobin 7.5 g/dL      Hematocrit 25.4 %     POC Glucose Once [[id number]]  (Normal) Collected: 01/06/25 2006    Specimen: Blood Updated: 01/06/25 2008     Glucose 113 mg/dL     Blood Gas, Arterial With Co-Ox [[id number]]  (Abnormal) Collected: 01/06/25 1959    Specimen: Arterial Blood Updated: 01/06/25 2000     Site Arterial Line     Apolinar's Test N/A     pH, Arterial 7.226 pH units      Comment: 84 Value below reference range        pCO2, Arterial 50.8 mm Hg      Comment: 83 Value above reference range        pO2, Arterial 66.9 mm Hg      Comment: 84 Value below reference range        HCO3, Arterial 21.1 mmol/L      Base Excess, Arterial -6.3 mmol/L      Hemoglobin, Blood Gas 7.8 g/dL      Comment: 84 Value below reference range        Hematocrit, Blood Gas 23.8 %      Oxyhemoglobin 90.3 %      Comment: 84 Value below reference range        Methemoglobin 0.20 %      Carboxyhemoglobin 1.8 %      CO2 Content 22.6 mmol/L      Temperature 37.0     Barometric Pressure for Blood Gas --     Comment: N/A        Modality Ventilator     FIO2 50 %      Ventilator Mode VC+/AC     Set Tidal Volume 0.36      Rate 24 Breaths/minute      PEEP 14.0     PIP 0 cmH2O      Comment: Meter: [id number]     :  [id number]        IPAP 0     EPAP 0     pH, Temp Corrected 7.226 pH Units      pCO2, Temperature Corrected 50.8 mm Hg      pO2, Temperature Corrected 66.9 mm Hg     POC Glucose Once [[id number]]  (Normal) Collected: 01/06/25 1847    Specimen: Blood Updated: 01/06/25 1848     Glucose 120 mg/dL     POC Glucose Once [[id number]]  (Normal) Collected: 01/06/25 1655    Specimen: Blood Updated: 01/06/25 1657     Glucose 126 mg/dL     POC Glucose Once [[id number]]  (Normal) Collected: 01/06/25 1440    Specimen: Blood Updated: 01/06/25 1441     Glucose 118 mg/dL     Blood Culture ID, PCR - Blood, Arm, Left [[id number]]  (Abnormal) Collected: 01/05/25 1305    Specimen: Blood from Arm, Left Updated: 01/06/25 1435     BCID, PCR Staph spp, not aureus or lugdunensis. Identification by BCID2 PCR.     BOTTLE TYPE Anaerobic Bottle    aPTT [[id number]]  (Abnormal) Collected: 01/06/25 1319    Specimen: Blood Updated: 01/06/25 1349     PTT 37.2 seconds     Narrative:      PTT = The equivalent PTT values for the therapeutic range of heparin levels at 0.3 to 0.5 U/ml are 60 to 70 seconds.    Protime-INR [[id number]]  (Abnormal) Collected: 01/06/25 1319    Specimen: Blood Updated: 01/06/25 1349     Protime 15.7 Seconds      INR 1.24    Heparin Anti-Xa [[id number]]  (Abnormal) Collected: 01/06/25 1319    Specimen: Blood Updated: 01/06/25 1340     Heparin Anti-Xa (UFH) 0.10 IU/ml     Blood Culture - Blood, Arm, Right [[id number]]  (Normal) Collected: 01/05/25 1308    Specimen: Blood from Arm, Right Updated: 01/06/25 1331     Blood Culture No growth at 24 hours    Narrative:      Less than seven (7) mL's of blood was collected.  Insufficient quantity may yield false negative results.    Blood Culture - Blood, Arm, Left [[id number]]  (Abnormal) Collected: 01/05/25 1305    Specimen: Blood from Arm, Left Updated: 01/06/25 1246     Blood Culture  Abnormal Stain     Gram Stain Anaerobic Bottle Gram positive cocci in clusters    Narrative:      Less than seven (7) mL's of blood was collected.  Insufficient quantity may yield false negative results.    Hemoglobin & Hematocrit, Blood [[id number]]  (Abnormal) Collected: 01/06/25 1222    Specimen: Blood Updated: 01/06/25 1230     Hemoglobin 7.0 g/dL      Hematocrit 23.8 %     POC Glucose Once [[id number]]  (Normal) Collected: 01/06/25 1218    Specimen: Blood Updated: 01/06/25 1219     Glucose 109 mg/dL     POC Glucose Once [[id number]]  (Normal) Collected: 01/04/25 1203    Specimen: Blood Updated: 01/06/25 1056     Glucose 127 mg/dL     POC Glucose Once [[id number]]  (Normal) Collected: 01/06/25 1019    Specimen: Blood Updated: 01/06/25 1021     Glucose 124 mg/dL           Imaging Results (Last 24 Hours)       Procedure Component Value Units Date/Time    XR Chest 1 View [[id number]] Collected: 01/07/25 0702     Updated: 01/07/25 0707    Narrative:      XR CHEST 1 VW    Date of Exam: 1/7/2025 12:29 AM EST    Indication: Intubated Patient    Comparison: 1/6/2025    Findings:  Cardiac size remains enlarged. The patient is intubated with the endotracheal tube above the anna. Nasoenteric tube passes at least to the distal esophagus. The film is underpenetrated. Right internal jugular line is in place with the tip in the SVC.   There is evidence of mild passive congestion. There is continued consolidation in the left lung base with a left-sided pleural effusion.      Impression:      Impression:  No significant change      Electronically Signed: Jayy Lee MD    1/7/2025 7:04 AM EST    Workstation ID: [id number]          Orders (last 24 hrs)        Start     Ordered    01/07/25 1200  Heparin Anti-Xa  Timed         01/07/25 0557    01/07/25 0805  POC Glucose Once  PROCEDURE ONCE        Comments: Complete no more than 45 minutes prior to patient eating      01/07/25 0803    01/07/25 0645  heparin (porcine) injection 4,000  Units  Once         01/07/25 0557    01/07/25 0605  POC Glucose Once  PROCEDURE ONCE        Comments: Complete no more than 45 minutes prior to patient eating      01/07/25 0603    01/07/25 0600  Vancomycin, Random  Timed         01/06/25 1138    01/07/25 0600  Basic Metabolic Panel  Morning Draw         01/06/25 1205    01/07/25 0600  Magnesium  Morning Draw         01/06/25 1205    01/07/25 0600  CBC & Differential  Morning Draw         01/06/25 1205    01/07/25 0600  CBC & Differential  Every Third Day      Comments: Discontinue After Heparin Stopped      01/06/25 1303    01/07/25 0600  Heparin Anti-Xa  Morning Draw         01/06/25 2042    01/07/25 0600  CBC Auto Differential  PROCEDURE ONCE         01/06/25 2201    01/07/25 0447  Scan Slide  Once,   Status:  Canceled         01/07/25 0446    01/07/25 0356  POC Glucose Once  PROCEDURE ONCE        Comments: Complete no more than 45 minutes prior to patient eating      01/07/25 0354    01/07/25 0330  Blood Gas, Arterial With Co-Ox  PROCEDURE ONCE         01/07/25 0329    01/07/25 0212  POC Glucose Once  PROCEDURE ONCE        Comments: Complete no more than 45 minutes prior to patient eating      01/07/25 0204    01/07/25 0013  POC Glucose Once  PROCEDURE ONCE        Comments: Complete no more than 45 minutes prior to patient eating      01/07/25 0011    01/06/25 2215  POC Glucose Once  PROCEDURE ONCE        Comments: Complete no more than 45 minutes prior to patient eating      01/06/25 2214    01/06/25 2205  Scan Slide  Once,   Status:  Canceled        Comments: Discontinue After Heparin Stopped      01/06/25 2204    01/06/25 2202  CBC Auto Differential  Once        Comments: Discontinue After Heparin Stopped      01/06/25 2201    01/06/25 2130  heparin (porcine) injection 4,000 Units  Once         01/06/25 2042    01/06/25 2009  POC Glucose Once  PROCEDURE ONCE        Comments: Complete no more than 45 minutes prior to patient eating      01/06/25 2006     01/06/25 2001  Blood Gas, Arterial With Co-Ox  PROCEDURE ONCE         01/06/25 1959    01/06/25 2000  Heparin Anti-Xa  Timed         01/06/25 1304    01/06/25 2000  Hemoglobin & Hematocrit, Blood  Timed         01/06/25 1458    01/06/25 2000  Blood Gas, Arterial -With Co-Ox Panel: Yes  Once         01/06/25 1900    01/06/25 1900  bumetanide (BUMEX) injection 2 mg  Once         01/06/25 1803    01/06/25 1900  bumetanide (BUMEX) injection 2 mg  Once,   Status:  Discontinued         01/06/25 1804    01/06/25 1849  POC Glucose Once  PROCEDURE ONCE        Comments: Complete no more than 45 minutes prior to patient eating      01/06/25 1847    01/06/25 1658  POC Glucose Once  PROCEDURE ONCE        Comments: Complete no more than 45 minutes prior to patient eating      01/06/25 1655    01/06/25 1500  ampicillin-sulbactam (UNASYN) 3 g in sodium chloride 0.9 % 100 mL MBP  Every 12 Hours         01/06/25 1143    01/06/25 1442  POC Glucose Once  PROCEDURE ONCE        Comments: Complete no more than 45 minutes prior to patient eating      01/06/25 1440    01/06/25 1400  heparin 33719 units/250 mL (100 units/mL) in 0.45 % NaCl infusion  Titrated         01/06/25 1303    01/06/25 1259  Initiate & Follow Heparin Protocol  Continuous         01/06/25 1303    01/06/25 1259  Notify Provider Platelet Count Less Than 89414  Continuous        Comments: Open Order Report to View Parameters Requiring Provider Notification    01/06/25 1303    01/06/25 1259  Stop Infusion & Notify Provider if Bleeding Occurs  Continuous        Comments: Open Order Report to View Parameters Requiring Provider Notification    01/06/25 1303    01/06/25 1259  Heparin Anti-Xa  STAT        Comments: Prior to Initial Heparin Bolus      01/06/25 1303    01/06/25 1259  Protime-INR  STAT        Comments: Prior to Initial Heparin Bolus      01/06/25 1303    01/06/25 1259  aPTT  STAT        Comments: Prior to Initial Heparin Bolus      01/06/25 1303    01/06/25 1225   Blood Culture ID, PCR - Blood, Arm, Left  Once         01/06/25 1224    01/06/25 1219  POC Glucose Once  PROCEDURE ONCE        Comments: Complete no more than 45 minutes prior to patient eating      01/06/25 1218    01/06/25 1205  Hemoglobin & Hematocrit, Blood  Once         01/06/25 1205    01/06/25 1151  Tube Feeding: Formula: Peptamen Intense VHP (Peptide Based, Very High Protein); Feeding Type: Continuous; Start at: 25 mL/hr; Then Advance By: 25 mL/hr; Every: 8 hours; To Goal Rate of: 75 mL/hr; Total Daily Feeding Volume (mL/day): 1500; Water Fl...  Diet Effective Now         01/06/25 1150    01/06/25 1057  POC Glucose Once  PROCEDURE ONCE        Comments: Complete no more than 45 minutes prior to patient eating      01/04/25 1203    01/06/25 1022  POC Glucose Once  PROCEDURE ONCE        Comments: Complete no more than 45 minutes prior to patient eating      01/06/25 1019    01/05/25 0900  ampicillin 2000 mg IVPB in 100 mL NS (MBP)  Every 6 Hours,   Status:  Discontinued         01/05/25 0738    01/05/25 0007  acetaminophen (TYLENOL) 160 MG/5ML oral solution 650 mg  Every 8 Hours PRN         01/05/25 0007    01/04/25 1412  EPINEPHrine PF (ADRENALIN) injection 0.3 mg  Every 5 Minutes PRN         01/04/25 1500    01/04/25 1412  EPINEPHrine (ADRENALIN) injection 0.3 mg  Every 5 Minutes PRN         01/04/25 1500    01/04/25 1412  diphenhydrAMINE (BENADRYL) injection 50 mg  As Needed         01/04/25 1500    01/04/25 1412  methylPREDNISolone sodium succinate (SOLU-Medrol) injection 125 mg  As Needed         01/04/25 1500    01/04/25 0930  vancomycin (dosing per levels)  Daily         01/04/25 0833    01/04/25 0915  ! Heparin drip discontinued 2/2 H&H drop. Please reach out to pharmacy team when appropriate for restart.  2 Times Daily,   Status:  Discontinued         01/04/25 0815    01/04/25 0600  Blood Gas, Arterial -With Co-Ox Panel: Yes  Daily       01/03/25 1843    01/04/25 0600  XR Chest 1 View  Daily        01/03/25 1843    01/03/25 2100  nystatin (MYCOSTATIN) powder  Every 12 Hours Scheduled         01/03/25 1817    01/03/25 1800  POC Glucose Q6H  Every 6 Hours      Comments: Complete no more than 45 minutes prior to patient eating      01/03/25 1329    01/03/25 1330  Daily Weights  Daily       01/03/25 1329    01/03/25 1329  Nutrition Panel  Weekly       01/03/25 1329    01/03/25 1329  C-reactive Protein  Weekly       01/03/25 1329    01/03/25 1326  Tube Feeding Assessment and I/O  Every Shift       01/03/25 1329    01/03/25 0600  CBC & Differential  Every Third Day      Comments: Discontinue After Heparin Stopped      01/02/25 1513    01/02/25 2100  metroNIDAZOLE (FLAGYL) IVPB 500 mg  Every 8 Hours,   Status:  Discontinued         01/02/25 1952 01/02/25 1704  Monitor QTc  Every Shift       01/02/25 1704    01/02/25 1512  Pharmacy to Dose Heparin  Continuous PRN         01/02/25 1513    01/02/25 1509  Pharmacy to dose vancomycin  Continuous PRN         01/02/25 1511    01/02/25 1400  Incentive Spirometry  Every 4 Hours While Awake       01/02/25 1228    01/02/25 1245  chlorhexidine (PERIDEX) 0.12 % solution 15 mL  Every 12 Hours Scheduled         01/02/25 1145    01/02/25 1245  famotidine (PEPCID) injection 20 mg  2 Times Daily         01/02/25 1145    01/02/25 1245  norepinephrine (LEVOPHED) 8 mg in 250 mL NS infusion (premix)  Titrated         01/02/25 1146    01/02/25 1245  midazolam (VERSED) 100 mg in 100mL NS infusion  Titrated         01/02/25 1147    01/02/25 1245  fentaNYL 2500 mcg/250 mL NS infusion  Titrated         01/02/25 1147    01/02/25 1245  insulin regular 1 unit/mL in 0.9% sodium chloride (Glucommander)  Titrated        Note to Pharmacy: Upon initiation of Glucommander insulin drip, make sure all other insulin orders and anti-diabetic medications have been discontinued.    01/02/25 1149    01/02/25 1230  mupirocin (BACTROBAN) 2 % nasal ointment 1 Application  2 Times Daily         01/02/25 1148  "   01/02/25 1230  sennosides-docusate (PERICOLACE) 8.6-50 MG per tablet 2 tablet  2 Times Daily        Placed in \"And\" Linked Group    01/02/25 1143    01/02/25 1228  ipratropium (ATROVENT) nebulizer solution 0.5 mg  Every 4 Hours PRN         01/02/25 1228    01/02/25 1228  ipratropium-albuterol (DUO-NEB) nebulizer solution 3 mL  Every 4 Hours PRN         01/02/25 1228    01/02/25 1200  Vital Signs Every Hour and Per Hospital Policy Based on Patient Condition  Every Hour       01/02/25 1143    01/02/25 1200  Intake & Output  Every Hour       01/02/25 1143    01/02/25 1200  Oral Care & Teeth Brushing - Intubated Patient  Every 4 Hours      Comments: [facility] at Least 2x/day    01/02/25 1145    01/02/25 1148  dextrose (D50W) (25 g/50 mL) IV injection 10-50 mL  Every 15 Minutes PRN         01/02/25 1149    01/02/25 1148  glucagon (GLUCAGEN) injection 1 mg  Every 15 Minutes PRN         01/02/25 1149    01/02/25 1147  fentaNYL (SUBLIMAZE) bolus from bag 10 mcg/mL injection 50 mcg  Every 30 Minutes PRN         01/02/25 1147    01/02/25 1144  Daily Weights  Daily       01/02/25 1143    01/02/25 1144  Daily CHG Bath While in Critical Care  Daily      Comments: Use for admission bath and length of critical care stay.    01/02/25 1143    01/02/25 1143  Oral Care - Patient Not on NPPV & Not Intubated  Every Shift       01/02/25 1143    01/02/25 1142  bisacodyl (DULCOLAX) suppository 10 mg  Daily PRN        Placed in \"And\" Linked Group    01/02/25 1143    01/02/25 1142  sodium chloride 0.9 % flush 10 mL  As Needed         01/02/25 1143    01/02/25 1142  polyethylene glycol (MIRALAX) packet 17 g  Daily PRN        Placed in \"And\" Linked Group    01/02/25 1143    Unscheduled  Spontaneous Awakening Trial  Daily - SAT       01/02/25 1145    Unscheduled  Spontaneous Breathing Trial  Daily - SBT       01/02/25 1145    Unscheduled  Treat Hypoglycemia As Recommended By [facility]™ & Notify Provider of Treatment  As Needed    "   Comments: Follow Hypoglycemia Orders As Outlined in Process Instructions (Open Order Report to View Full Instructions)  Notify Provider Any Time Hypoglycemia Treatment is Administered    01/02/25 1149    Unscheduled  If Insulin Infusion is Paused - Follow [facility] Instructions  As Needed       01/02/25 1149    Unscheduled  POC Glucose PRN  As Needed      Comments: [facility] Recommended POC Glucose Testing Will Vary Between Every 15 Minutes & Every 2 Hours Release PRN POC Glucose Orders as Needed      01/02/25 1149    Unscheduled  Jayesh & Document Feeding Tube Depth (in cm)  As Needed       01/03/25 1329    Unscheduled  Verify Feeding Tube Placement Upon Insertion & As Needed  As Needed       01/03/25 1329    Unscheduled  Flush Feeding Tube With 30-50mL Water As Needed  As Needed       01/03/25 1329    --  vitamin B-12 (CYANOCOBALAMIN) 1000 MCG tablet  Daily         01/03/25 1406    --  DULoxetine (CYMBALTA) 30 MG capsule  Daily         01/03/25 1407    --  DULoxetine (CYMBALTA) 30 MG capsule  Nightly         01/03/25 1407    --  metoprolol succinate XL (TOPROL-XL) 50 MG 24 hr tablet  2 Times Daily         01/03/25 1408    --  multivitamin with minerals (MULTIVITAMIN WOMEN PO)  Daily         01/03/25 1410    --  Enoxaparin Sodium (LOVENOX) 100 MG/ML solution prefilled syringe syringe  Every 12 Hours Scheduled         01/03/25 1410                     Physician Progress Notes (last 24 hours)        Félix Rhodes MD at 01/06/25 1757           LOS: 4 days   Patient Care Team:  Bladimir Calle PA-C as PCP - General (Physician Assistant)    Chief Complaint: MARIAA   Hyperkalemia    Subjective     Seen and examined at bedside for the first time.   Remains critically ill, intubated sedated.   Minimal urine output overnight.  Hypotensive earlier today.     History taken from: family RN    Objective     Vital Sign Min/Max for last 24 hours  Temp  Min: 99 °F (37.2 °C)  Max: 100.7 °F (38.2 °C)   BP  Min: 74/35   "Max: 144/50   Pulse  Min: 92  Max: 115   Resp  Min: 24  Max: 24   SpO2  Min: 87 %  Max: 96 %   No data recorded   No data recorded         I/O this shift:  In: 1676.9 [I.V.:945.9; Other:375; NG/GT:356]  Out: 120 [Urine:120]  I/O last 3 completed shifts:  In: 5645 [I.V.:4252; Other:75; NG/GT:123; IV Piggyback:1195]  Out: 3100 [Urine:1850; Emesis/NG output:1250]    Objective    Gen: intubated and sedated.    HENT: NC, AT  NECK: Supple, ETT in place  LUNGS: Coarse breath sound on vent, non labored respirtation   CVS: S1/S2 audible, RRR, no murmur   Abd: Soft, NT, ND, BS+   Ext: Trace edema, no cyanosis   CNS: Intubated and sedated.   Psy: Intubated and sedated.   Skin: Warm, dry and intact    Results Review:     I reviewed the patient's new clinical results.    WBC WBC   Date Value Ref Range Status   01/06/2025 9.06 3.40 - 10.80 10*3/mm3 Final   01/05/2025 9.82 3.40 - 10.80 10*3/mm3 Final   01/04/2025 8.93 3.40 - 10.80 10*3/mm3 Final      HGB Hemoglobin   Date Value Ref Range Status   01/06/2025 7.0 (L) 12.0 - 15.9 g/dL Final   01/06/2025 7.6 (L) 12.0 - 15.9 g/dL Final   01/05/2025 8.0 (L) 12.0 - 15.9 g/dL Final   01/04/2025 8.2 (L) 12.0 - 15.9 g/dL Final   01/04/2025 6.7 (C) 12.0 - 15.9 g/dL Final     Comment:     Confirmed/called      HCT Hematocrit   Date Value Ref Range Status   01/06/2025 23.8 (L) 34.0 - 46.6 % Final   01/06/2025 26.3 (L) 34.0 - 46.6 % Final   01/05/2025 27.1 (L) 34.0 - 46.6 % Final   01/04/2025 27.5 (L) 34.0 - 46.6 % Final   01/04/2025 23.0 (L) 34.0 - 46.6 % Final      Platlets No results found for: \"LABPLAT\"   MCV MCV   Date Value Ref Range Status   01/06/2025 101.2 (H) 79.0 - 97.0 fL Final   01/05/2025 98.2 (H) 79.0 - 97.0 fL Final   01/04/2025 99.6 (H) 79.0 - 97.0 fL Final          Sodium Sodium   Date Value Ref Range Status   01/06/2025 142 136 - 145 mmol/L Final   01/05/2025 143 136 - 145 mmol/L Final   01/04/2025 144 136 - 145 mmol/L Final      Potassium Potassium   Date Value Ref Range " "Status   01/06/2025 4.5 3.5 - 5.2 mmol/L Final   01/05/2025 3.9 3.5 - 5.2 mmol/L Final   01/04/2025 4.3 3.5 - 5.2 mmol/L Final      Chloride Chloride   Date Value Ref Range Status   01/06/2025 104 98 - 107 mmol/L Final   01/05/2025 106 98 - 107 mmol/L Final   01/04/2025 110 (H) 98 - 107 mmol/L Final      CO2 CO2   Date Value Ref Range Status   01/06/2025 18.0 (L) 22.0 - 29.0 mmol/L Final   01/05/2025 22.0 22.0 - 29.0 mmol/L Final   01/04/2025 23.0 22.0 - 29.0 mmol/L Final      BUN BUN   Date Value Ref Range Status   01/06/2025 49 (H) 6 - 20 mg/dL Final   01/05/2025 42 (H) 6 - 20 mg/dL Final   01/04/2025 37 (H) 6 - 20 mg/dL Final      Creatinine Creatinine   Date Value Ref Range Status   01/06/2025 2.99 (H) 0.57 - 1.00 mg/dL Final   01/05/2025 2.22 (H) 0.57 - 1.00 mg/dL Final   01/04/2025 1.99 (H) 0.57 - 1.00 mg/dL Final      Calcium Calcium   Date Value Ref Range Status   01/06/2025 8.2 (L) 8.6 - 10.5 mg/dL Final   01/05/2025 8.0 (L) 8.6 - 10.5 mg/dL Final   01/04/2025 8.0 (L) 8.6 - 10.5 mg/dL Final      PO4 No results found for: \"CAPO4\"   Albumin Albumin   Date Value Ref Range Status   01/05/2025 3.2 (L) 3.5 - 5.2 g/dL Final   01/04/2025 3.0 (L) 3.5 - 5.2 g/dL Final      Magnesium Magnesium   Date Value Ref Range Status   01/06/2025 1.8 1.6 - 2.6 mg/dL Final      Uric Acid No results found for: \"URICACID\"         Results from last 7 days   Lab Units 01/06/25  1222 01/06/25  0319 01/05/25  0533 01/04/25  1703 01/04/25  0434 01/03/25  0433 01/02/25  1314 01/02/25  0900   SODIUM mmol/L  --  142 143  --  144 142  142  142   < > 137   POTASSIUM mmol/L  --  4.5 3.9  --  4.3 4.2  4.2  4.2   < > 6.1*   CHLORIDE mmol/L  --  104 106  --  110* 105  105  105   < > 100   CO2 mmol/L  --  18.0* 22.0  --  23.0 21.0*  24.0  24.0   < > 22.6   BUN mg/dL  --  49* 42*  --  37* 29*  28*  28*   < > 40*   CREATININE mg/dL  --  2.99* 2.22*  --  1.99* 1.26*  1.29*  1.29*   < > 2.39*   CALCIUM mg/dL  --  8.2* 8.0*  --  8.0* 8.9  " 8.8  8.8   < > 8.1*   ALBUMIN g/dL  --   --  3.2*  --  3.0* 3.3*  3.3*  --  3.4*   WBC 10*3/mm3  --  9.06 9.82  --  8.93 8.23   < > 10.48   HEMOGLOBIN g/dL 7.0* 7.6* 8.0* 8.2* 6.7* 7.5*   < > 8.4*   PLATELETS 10*3/mm3  --  167 197  --  195 196   < > 232    < > = values in this interval not displayed.       Intake/Output Summary (Last 24 hours) at 1/6/2025 1757  Last data filed at 1/6/2025 1600  Gross per 24 hour   Intake 4931.6 ml   Output 820 ml   Net 4111.6 ml         Medication Review: Yes    Assessment & Plan       Acute respiratory failure with hypercapnia    MARIAA (acute kidney injury)    Type 2 diabetes mellitus with hyperglycemia    Sepsis      Assessment & Plan:    1-MARIAA- non oliguric - Pre-renal azotemia vs sepsis related MARIAA. UA - RBC 3-5, + protein. 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 At home on lisinopril, metformin and topiramate. Initially was improving with IV hydration and hemodynamic support but now worsening and UOP has decreased.   2- Hyperkalemia - improved - with lokelma and bicarb infusion  3- Hyperglycemia   4- Anemia   5- Shock   6- hx of Factor V Leiden deficiency   7- hx of DM   8- Respiratory distress - suspicion of aspiration pneumonia   9- Hx of Gout      Plan:  -Worsening renal function; minimal urine output overnight.  Likely heading towards dialysis.   -Give a dose of Bumex 2 mg today; if no improvement in urine output will need renal replacement therapy likely in next 24-48 hours.   - Avoid nephrotoxic agents.   - Renal diet   - Adjust meds per renal function   - No emergent need of RRT   - Monitor H/H and transfuse for Hgb less than 7.0     Discussed with RN and Critical care team  Félix Rhodes MD  01/06/25  17:57 EST              Electronically signed by Félix Rhodes MD at 01/06/25 8357       Saji De Jesus MD at 01/06/25 1158            Intensive Care Follow-up     Hospital:  LOS: 4 days   Ms. Nataliagraham Arzate, 39 y.o. female is followed for:    Acute respiratory failure with hypercapnia     Subjective   Subjective     Ms. Arzate is a 38yo F with a history of HLD, Hypothyroidism, Afib, PE/DVT, Factor V Leiden mutation, and stroke who presented to [facility] with altered mental status. Labs there were significant for renal failure, hyperglycemia, and hypercapnic respiratory failure. Imaging consistent with basilar pulmonary infiltrates concerning for pneumonia. She was intubated and required the initiation of vasopressors. She was transferred to [facility] on 1/2/25 for ongoing care.      Since arrival, she has continued to require mechanical ventilation along with vasopressors. She has been started on Vancomycin, Flagyl and Cefepime. Cultures are significant for MRSA in the sputum along with gemella sanguinis in the blood.      Nephrology has been following for MARIAA with poor urine output and hyperkalemia.      On the morning of 1/4/25, she developed worsening hypoxia despite an FiO2 of 100% and PEEP of 16. Imaging showed white out of the left lung. Bronchoscopy was performed with mucopurulent secretions suctioned from the left upper and left lower lobes. Repeat CXR after bronchoscopy showed some improvement in left upper lobe aeration with persistent left lower lobe disease vs pleural effusion.   Interval History:  The chart has been reviewed.  The patient continues to be febrile.  Secretions are minimal.  Currently on FiO2 of 50% with a PEEP of 16.  I was able to reduce this to 14 of PEEP without problem.  Culture data has been reviewed.    The patient's past medical, surgical and social history were reviewed and updated in Epic as appropriate.     Objective   Objective     Infusions:  fentanyl 10 mcg/mL,  mcg/hr, Last Rate: 300 mcg/hr (01/06/25 1020)  insulin, 0-100 Units/hr, Last Rate: 14.9 Units/hr (01/06/25 1020)  Midazolam 1 mg/mL 100mL NS, 1-10 mg/hr, Last Rate: 10 mg/hr (01/06/25 0933)  norepinephrine, 0.02-0.3 mcg/kg/min, Last Rate: Stopped (01/06/25  "0153)  Pharmacy to Dose Heparin,   Pharmacy to dose vancomycin,       Medications:  Pharmacy Consult, , Not Applicable, BID  ampicillin-sulbactam, 3 g, Intravenous, Q12H  chlorhexidine, 15 mL, Mouth/Throat, Q12H  famotidine, 20 mg, Intravenous, BID  mupirocin, 1 Application, Each Nare, BID  nystatin, , Topical, Q12H  senna-docusate sodium, 2 tablet, Nasogastric, BID  vancomycin (dosing per levels), , Not Applicable, Daily        Vital Sign Min/Max for last 24 hours  Temp  Min: 99 °F (37.2 °C)  Max: 101.6 °F (38.7 °C)   BP  Min: 74/35  Max: 144/50   Pulse  Min: 87  Max: 115   Resp  Min: 24  Max: 24   SpO2  Min: 87 %  Max: 98 %   No data recorded       Input/Output for last 24 hour shift  01/05 0701 - 01/06 0700  In: 4362.5 [I.V.:3164.5]  Out: 1175 [Urine:975]   Mode: VC+/AC  FiO2 (%):  [50 %-85 %] 50 %  S RR:  [24] 24  S VT:  [380 mL] 380 mL  PEEP/CPAP (cm H2O):  [14 cm H20-16 cm H20] 14 cm H20  MAP (cm H2O):  [18-21] 18  Objective:  General Appearance:  Uncomfortable, ill-appearing and in no acute distress.    Vital signs: (most recent): Blood pressure 115/53, pulse 101, temperature (!) 100.7 °F (38.2 °C), temperature source Axillary, resp. rate 24, height 162.6 cm (64.02\"), weight (!) 159 kg (350 lb 11.2 oz), SpO2 93%, not currently breastfeeding.    HEENT: (Endotracheal tube in place.)    Lungs:  There are decreased breath sounds and wheezes.  No rales or rhonchi.    Heart: Tachycardia.  Regular rhythm.  S1 normal and S2 normal.  No murmur.   Chest: Symmetric chest wall expansion.   Abdomen: Abdomen is soft and non-distended.  Bowel sounds are normal.   There is no abdominal tenderness.     Extremities: There is dependent edema.    Neurological: (Sedated, minimally responsive.).    Pupils:  Pupils are equal, round, and reactive to light.  Pupils are equal.   Skin:  Warm.                Results from last 7 days   Lab Units 01/06/25  0319 01/05/25  0533 01/04/25  1703 01/04/25  0434   WBC 10*3/mm3 9.06 9.82  --  " 8.93   HEMOGLOBIN g/dL 7.6* 8.0* 8.2* 6.7*   PLATELETS 10*3/mm3 167 197  --  195     Results from last 7 days   Lab Units 01/06/25  0319 01/05/25  0533 01/04/25  0434 01/03/25  0433 01/02/25  1538 01/02/25  1314   SODIUM mmol/L 142 143 144 142  142  142   < > 136   POTASSIUM mmol/L 4.5 3.9 4.3 4.2  4.2  4.2   < > 6.1*   CO2 mmol/L 18.0* 22.0 23.0 21.0*  24.0  24.0   < > 21.0*   BUN mg/dL 49* 42* 37* 29*  28*  28*   < > 39*   CREATININE mg/dL 2.99* 2.22* 1.99* 1.26*  1.29*  1.29*   < > 1.69*   MAGNESIUM mg/dL 1.8  --   --  2.0  2.0  --  1.7   PHOSPHORUS mg/dL 5.2*  --   --  2.8  2.7  --  4.5   GLUCOSE mg/dL 118* 107* 107* 142*  147*  147*   < > 377*    < > = values in this interval not displayed.     Estimated Creatinine Clearance: 38.8 mL/min (A) (by C-G formula based on SCr of 2.99 mg/dL (H)).    Results from last 7 days   Lab Units 01/06/25  0311   PH, ARTERIAL pH units 7.209*   PCO2, ARTERIAL mm Hg 52.1*   PO2 ART mm Hg 71.5*         I reviewed the patient's results and images.     Assessment & Plan   Impression        Acute respiratory failure with hypercapnia    MARIAA (acute kidney injury)    Type 2 diabetes mellitus with hyperglycemia    Sepsis       Plan        For underlying sepsis and tracheobronchitis, we will plan to de-escalate to Unasyn and vancomycin for now.  Due to underlying anemia, repeat hemoglobin and we will continue to follow.  Hold further diuresis for now and we will monitor renal function closely.  Continue with insulin drip for now and we will see if we can stabilize her control with subcutaneous coverage soon.  Initiate trophic tube feedings.  Renal replacement therapy per nephrology.  Reinitiate heparin drip if hemoglobin remained stable.  Continue to wean ventilator as tolerated.  She is not yet ready for attempts at spontaneous mode due to high FiO2 and PEEP.    Plan of care and goals reviewed with mulitdisciplinary/antibiotic stewardship team during rounds.   I discussed  the patient's findings and my recommendations with patient and nursing staff     High level of risk due to:  drug(s) requiring intensive monitoring for toxicity and parenteral controlled substances.      Saji De Jesus MD, [facility]  Pulmonology and Critical Care Medicine       Electronically signed by Saji De Jesus MD at 01/06/25 1205       Consult Notes (last 24 hours)  Notes from 01/06/25 1000 through 01/07/25 1000   No notes of this type exist for this encounter.

## 2025-01-07 NOTE — PROGRESS NOTES
"Pharmacy Consult - Vancomycin Dosing and Monitoring (Renal Dysfunction / Dialysis)    Natalia Arzate is a 39 y.o. female receiving vancomycin therapy.     Indication:  MRSA PNA  Consulting Provider:   Intensivist  ID Consult:  No    Goal Trough (Pulse Dosing):  15-20 mcg/mL    Current Antimicrobial Therapy  Anti-Infectives (From admission, onward)      Ordered     Dose/Rate Route Frequency Start Stop    01/06/25 1143  ampicillin-sulbactam (UNASYN) 3 g in sodium chloride 0.9 % 100 mL MBP        Ordering Provider: Saji De Jesus MD    3 g  over 30 Minutes Intravenous Every 12 Hours 01/06/25 1500 01/13/25 1459    01/05/25 0738  ampicillin 2000 mg IVPB in 100 mL NS (MBP)  Status:  Discontinued        Ordering Provider: Tiffanie You., DO    2 g  200 mL/hr over 30 Minutes Intravenous Every 6 Hours 01/05/25 0900 01/06/25 1143    01/05/25 0800  Vancomycin HCl 1,250 mg in sodium chloride 0.9 % 250 mL VTB        Ordering Provider: Rocky Brewster RPH    1,250 mg  200 mL/hr over 75 Minutes Intravenous Once 01/05/25 0900 01/05/25 1105    01/04/25 1511  ampicillin 2000 mg IVPB in 100 mL NS (MBP)  Status:  Discontinued        Ordering Provider: Bettie Walter APRN    2 g  200 mL/hr over 30 Minutes Intravenous Every 4 Hours 01/04/25 2100 01/05/25 0738    01/04/25 1500  ampicillin 1780 mg/100 mL (89% dose) IVPB for IV graded dose challenge        Ordering Provider: Bettie Walter APRN   Placed in \"Followed by\" Linked Group    1,780 mg  400 mL/hr over 15 Minutes Intravenous Once 01/04/25 1800 01/04/25 1818    01/04/25 1500  ampicillin 200 mg/100 mL (10% dose) IVPB for IV graded dose challenge        Ordering Provider: Bettie Walter APRN   Placed in \"Followed by\" Linked Group    200 mg  400 mL/hr over 15 Minutes Intravenous Once 01/04/25 1700 01/04/25 1712    01/04/25 1500  ampicillin 20 mg/100 mL (1% dose) IVPB for IV graded dose challenge        Ordering Provider: Bettie Walter APRN   Placed in " "\"Followed by\" Linked Group    20 mg  400 mL/hr over 15 Minutes Intravenous Once 01/04/25 1600 01/04/25 1617    01/04/25 0833  vancomycin (dosing per levels)        Ordering Provider: Rocky Brewster RPH     Not Applicable Daily 01/04/25 0930 01/08/25 0859    01/03/25 1114  cefepime 2000 mg IVPB in 100 mL NS (MBP)  Status:  Discontinued        Ordering Provider: David Lazaro PharmD    2,000 mg  over 4 Hours Intravenous Every 8 Hours 01/03/25 1600 01/04/25 1500    01/02/25 1548  vancomycin (dosing per levels)  Status:  Discontinued        Ordering Provider: David Lazaro PharmD     Not Applicable Daily 01/03/25 0900 01/03/25 0738    01/03/25 0738  Vancomycin HCl 1,250 mg in sodium chloride 0.9 % 250 mL VTB  Status:  Discontinued        Ordering Provider: David Lazaro PharmD    1,250 mg  200 mL/hr over 75 Minutes Intravenous Every 12 Hours 01/03/25 0900 01/04/25 0833    01/02/25 1231  cefepime 2000 mg IVPB in 100 mL NS (MBP)  Status:  Discontinued        Ordering Provider: Bettie Walter APRN    2,000 mg  over 4 Hours Intravenous Every 12 Hours 01/02/25 2100 01/03/25 1114    01/02/25 1952  metroNIDAZOLE (FLAGYL) IVPB 500 mg  Status:  Discontinued        Ordering Provider: Roly Garcia MD    500 mg  200 mL/hr over 30 Minutes Intravenous Every 8 Hours 01/02/25 2100 01/06/25 1143    01/02/25 1547  vancomycin (VANCOCIN) 1,000 mg in sodium chloride 0.9 % 250 mL IVPB-VTB        Ordering Provider: David Lazaro PharmD    1,000 mg  250 mL/hr over 60 Minutes Intravenous Once 01/02/25 1645 01/02/25 1811    01/02/25 1511  Pharmacy to dose vancomycin        Ordering Provider: David Lazaro PharmD     Not Applicable Continuous PRN 01/02/25 1509 01/09/25 1508    01/02/25 1235  cefepime 2000 mg IVPB in 100 mL NS (MBP)        Ordering Provider: Knoll, Bettie E, APRN    2,000 mg  over 30 Minutes Intravenous Once 01/02/25 1330 01/02/25 1317    01/02/25 1228  cefTRIAXone (ROCEPHIN) 2,000 mg in sodium " "chloride 0.9 % 100 mL MBP  Status:  Discontinued        Ordering Provider: Bettie Walter APRN    2,000 mg  200 mL/hr over 30 Minutes Intravenous Every 24 Hours 01/02/25 1315 01/02/25 1230          Allergies  Allergies as of 01/02/2025 - Reviewed 01/02/2025   Allergen Reaction Noted    Salvia officinalis Itching, Other (See Comments), and Shortness Of Breath 03/02/2023    Shellfish allergy Anaphylaxis 12/30/2018    Toradol [ketorolac tromethamine] Anaphylaxis and Hives 06/27/2016    Tree nut Anaphylaxis and Shortness Of Breath 03/16/2022    Haldol [haloperidol] Hives and Mental Status Change 03/04/2020    Tramadol Hives and Swelling 12/08/2018    Amoxicillin Hives and Rash 06/27/2016    Penicillins Hives and Rash 06/27/2016     Labs  Results from last 7 days   Lab Units 01/07/25  0433 01/06/25  0319 01/05/25  0533   BUN mg/dL 55* 49* 42*   CREATININE mg/dL 3.27* 2.99* 2.22*     Results from last 7 days   Lab Units 01/07/25  0433 01/06/25  1956 01/06/25  0319   WBC 10*3/mm3 8.54 8.24 9.06     Evaluation of Dosing     Last Dose Received in the ED/Outside Facility: 2500mg IV x 1 given 1/2 at ~2100  Is Patient on Dialysis or Renal Replacement: No; potential RRT    Height - 162.6 cm (64.02\")  Weight - (!) 159 kg (350 lb 11.2 oz)    Estimated Creatinine Clearance: 35.5 mL/min (A) (by C-G formula based on SCr of 3.27 mg/dL (H)).    Intake & Output (last 2 days)         01/05 0701 01/06 0700 01/06 0701 01/07 0700 01/07 0701 01/08 0700    I.V. (mL/kg) 3164.5 (19.9) 2134.7 (13.4) 567.2 (3.6)    Blood       Other 75 731 240    NG/ 922 539    IV Piggyback 1000 100     Total Intake(mL/kg) 4362.5 (27.4) 3887.7 (24.5) 1346.2 (8.5)    Urine (mL/kg/hr) 975 (0.3) 1495 (0.4) 800 (0.9)    Emesis/NG output 200      Total Output 1175 1495 800    Net +3187.5 +2392.7 +546.2                 Microbiology  Microbiology Results (last 10 days)       Procedure Component Value - Date/Time    Blood Culture - Blood, Arm, Right " [[id number]]  (Normal) Collected: 01/05/25 1308    Lab Status: Preliminary result Specimen: Blood from Arm, Right Updated: 01/06/25 1331     Blood Culture No growth at 24 hours    Narrative:      Less than seven (7) mL's of blood was collected.  Insufficient quantity may yield false negative results.    Blood Culture - Blood, Arm, Left [[id number]]  (Abnormal) Collected: 01/05/25 1305    Lab Status: Final result Specimen: Blood from Arm, Left Updated: 01/07/25 1100     Blood Culture Staphylococcus, coagulase negative     Isolated from Anaerobic Bottle     Gram Stain Anaerobic Bottle Gram positive cocci in clusters    Narrative:      Less than seven (7) mL's of blood was collected.  Insufficient quantity may yield false negative results.  Probable contaminant requires clinical correlation, susceptibility not performed unless requested by physician.    Blood Culture ID, PCR - Blood, Arm, Left [[id number]]  (Abnormal) Collected: 01/05/25 1305    Lab Status: Final result Specimen: Blood from Arm, Left Updated: 01/06/25 1435     BCID, PCR Staph spp, not aureus or lugdunensis. Identification by BCID2 PCR.     BOTTLE TYPE Anaerobic Bottle    Respiratory Culture - Bronchial Wash, Lung, Left Lower Lobe [[id number]]  (Abnormal)  (Susceptibility) Collected: 01/04/25 0755    Lab Status: Final result Specimen: Bronchial Wash from Lung, Left Lower Lobe Updated: 01/06/25 0908     Respiratory Culture Scant growth (1+) Staphylococcus aureus, MRSA     Comment:   Methicillin resistant Staphylococcus aureus, Patient may be an isolation risk.         No Normal Respiratory Margo     Gram Stain Moderate (3+) WBCs seen      Rare (1+) Gram positive cocci in pairs and clusters    Susceptibility        Staphylococcus aureus, MRSA      KELSIE      Clindamycin 0.25 ug/ml Resistant      Linezolid 2 ug/ml Susceptible      Oxacillin >=4 ug/ml Resistant      Tetracycline <=1 ug/ml Susceptible      Trimethoprim + Sulfamethoxazole <=10 ug/ml Susceptible       Vancomycin 1 ug/ml Susceptible                       Susceptibility Comments       Staphylococcus aureus, MRSA    This isolate is presumed to be clindamycin resistant based on detection of inducible clindamycin resistance.  Clindamycin may still be effective in some patients.  This isolate is presumed to be clindamycin resistant based on detection of inducible clindamycin resistance.  Clindamycin may still be effective in some patients.               Eosinophil Smear - Urine, Indwelling Urethral Catheter [[id number]]  (Normal) Collected: 01/02/25 1542    Lab Status: Final result Specimen: Urine from Indwelling Urethral Catheter Updated: 01/02/25 1700     Eosinophil Smear 0 % EOS/100 Cells     Narrative:      No eosinophil seen    MRSA Screen, PCR (Inpatient) - Swab, Nares [[id number]]  (Abnormal) Collected: 01/02/25 1246    Lab Status: Final result Specimen: Swab from Nares Updated: 01/02/25 1504     MRSA PCR Positive    Narrative:      The negative predictive value of this diagnostic test is high and should only be used to consider de-escalating anti-MRSA therapy. A positive result may indicate colonization with MRSA and must be correlated clinically.    Urine Culture - Urine, Indwelling Urethral Catheter [[id number]]  (Normal) Collected: 01/02/25 1213    Lab Status: Final result Specimen: Urine from Indwelling Urethral Catheter Updated: 01/04/25 1251     Urine Culture No growth    S. Pneumo Ag Urine or CSF - Urine, Urine, Clean Catch [[id number]]  (Normal) Collected: 01/02/25 1213    Lab Status: Final result Specimen: Urine, Clean Catch Updated: 01/02/25 1913     Strep Pneumo Ag Negative    Legionella Antigen, Urine - Urine, Urine, Clean Catch [[id number]]  (Normal) Collected: 01/02/25 1213    Lab Status: Final result Specimen: Urine, Clean Catch Updated: 01/02/25 1913     LEGIONELLA ANTIGEN, URINE Negative    Respiratory Culture - Sputum, ET Suction [[id number]]  (Abnormal)  (Susceptibility) Collected: 01/02/25 1212     Lab Status: Final result Specimen: Sputum from ET Suction Updated: 01/04/25 0939     Respiratory Culture Moderate growth (3+) Staphylococcus aureus, MRSA      No Normal Respiratory Margo     Gram Stain Many (4+) WBCs per low power field      Rare (1+) Epithelial cells per low power field      Many (4+) Gram positive cocci in pairs and clusters    Susceptibility        Staphylococcus aureus, MRSA      KELSIE      Clindamycin 0.25 ug/ml Resistant      Linezolid 2 ug/ml Susceptible      Oxacillin >=4 ug/ml Resistant      Tetracycline <=1 ug/ml Susceptible      Trimethoprim + Sulfamethoxazole <=10 ug/ml Susceptible      Vancomycin <=0.5 ug/ml Susceptible                       Susceptibility Comments       Staphylococcus aureus, MRSA    This isolate is presumed to be clindamycin resistant based on detection of inducible clindamycin resistance.  Clindamycin may still be effective in some patients.               Blood Culture - Blood, Wrist, Right [[id number]]  (Abnormal) Collected: 01/01/25 2039    Lab Status: Final result Specimen: Blood from Wrist, Right Updated: 01/04/25 0707     Blood Culture Staphylococcus, coagulase negative     Isolated from Aerobic Bottle     Gram Stain Gram positive cocci in clusters    Narrative:      Probable contaminant requires clinical correlation, susceptibility not performed unless requested by physician.      Blood Culture - Blood, Wrist, Left [[id number]]  (Abnormal) Collected: 01/01/25 2039    Lab Status: Final result Specimen: Blood from Wrist, Left Updated: 01/04/25 0702     Blood Culture Gemella sanguinis     Comment: Currently there are no standardized antimicrobial testing guidelines for Gemella species. Literature search has this organism sensitive to penicillin and ampicillin.          Isolated from Anaerobic Bottle     Gram Stain Anaerobic Bottle Gram positive cocci in clusters    Blood Culture ID, PCR - Blood, Wrist, Left [[id number]] Collected: 01/01/25 2039    Lab Status: Final  result Specimen: Blood from Wrist, Left Updated: 01/02/25 1604     BCID, PCR Negative by BCID PCR. Culture to Follow.     BOTTLE TYPE Anaerobic Bottle    Respiratory Panel PCR w/COVID-19(SARS-CoV-2) [facility] In-House, NP Swab in UTM/VTM, 2 HR TAT - Swab, Nasopharynx [[id number]]  (Normal) Collected: 01/01/25 2023    Lab Status: Final result Specimen: Swab from Nasopharynx Updated: 01/01/25 2201     ADENOVIRUS, PCR Not Detected     Coronavirus 229E Not Detected     Coronavirus HKU1 Not Detected     Coronavirus NL63 Not Detected     Coronavirus OC43 Not Detected     COVID19 Not Detected     Human Metapneumovirus Not Detected     Human Rhinovirus/Enterovirus Not Detected     Influenza A PCR Not Detected     Influenza B PCR Not Detected     Parainfluenza Virus 1 Not Detected     Parainfluenza Virus 2 Not Detected     Parainfluenza Virus 3 Not Detected     Parainfluenza Virus 4 Not Detected     RSV, PCR Not Detected     Bordetella pertussis pcr Not Detected     Bordetella parapertussis PCR Not Detected     Chlamydophila pneumoniae PCR Not Detected     Mycoplasma pneumo by PCR Not Detected    Narrative:      In the setting of a positive respiratory panel with a viral infection PLUS a negative procalcitonin without other underlying concern for bacterial infection, consider observing off antibiotics or discontinuation of antibiotics and continue supportive care. If the respiratory panel is positive for atypical bacterial infection (Bordetella pertussis, Chlamydophila pneumoniae, or Mycoplasma pneumoniae), consider antibiotic de-escalation to target atypical bacterial infection.          Vancomycin Levels  Results from last 7 days   Lab Units 01/07/25  0433 01/06/25  0319 01/05/25  0533 01/04/25  0946 01/03/25  0433 01/02/25  1314   VANCOMYCIN RM mcg/mL 21.70 27.70 18.90 27.90 13.40 13.90                 Assessment/Plan:    PTD vancomycin 2/2 MRSA PNA.   Given evidence of MARIAA & solitary kidney, continue w/  pulse dosing. MARIAA continuing to worsen.   Last dose of Vancomycin was 1250 mg (~7.9 mg/kg) IV x 1 on 1/5/25 AM.  Vancomycin random level w/ AM labs on 1/6/24 was supratherapeutic at 27.7 mcg/mL. No additional doses given. Level on 1/7 AM remained supratherapeutic at 21.70 mcg/mL. Minimal clearance in last 24 hours. No plans for HD/CRRT today. Will re-assess level tomorrow AM.   Reassess daily to determine appropriateness of pulse vs scheduled dosing.  Patient tolerated ampicillin graded-dose challenge. Per discussion in rounds, consolidated antimicrobial regimen to vancomycin/Unasyn.   Pharmacy will continue to follow & adjust dose based on renal function, drug levels, & clinical status.    Thank you,  Monalisa Simms, PharmD  1/7/2025

## 2025-01-07 NOTE — PROGRESS NOTES
Intensive Care Follow-up     Hospital:  LOS: 5 days   Ms. Natalia Arzate, 39 y.o. female is followed for:   Acute respiratory failure with hypercapnia        Subjective     Ms. Arzate is a 38yo F with a history of HLD, Hypothyroidism, Afib, PE/DVT, Factor V Leiden mutation, and stroke who presented to [facility] with altered mental status. Labs there were significant for renal failure, hyperglycemia, and hypercapnic respiratory failure. Imaging consistent with basilar pulmonary infiltrates concerning for pneumonia. She was intubated and required the initiation of vasopressors. She was transferred to [facility] on 1/2/25 for ongoing care.      Since arrival, she has continued to require mechanical ventilation along with vasopressors. She has been started on Vancomycin, Flagyl and Cefepime. Cultures are significant for MRSA in the sputum along with gemella sanguinis in the blood.      Nephrology has been following for MARIAA with poor urine output and hyperkalemia.      On the morning of 1/4/25, she developed worsening hypoxia despite an FiO2 of 100% and PEEP of 16. Imaging showed white out of the left lung. Bronchoscopy was performed with mucopurulent secretions suctioned from the left upper and left lower lobes. Repeat CXR after bronchoscopy showed some improvement in left upper lobe aeration with persistent left lower lobe disease vs pleural effusion.   Interval History:  The chart has been reviewed.  Patient is currently on 14 of PEEP with an FiO2 of 60%.  She remains on norepinephrine drip.  Heparin insulin also continue to infuse.  She is tolerating tube feedings currently.  She is currently at around 12 units/h of insulin.  Maximum temperature was 100.9 °F overnight.    The patient's past medical, surgical and social history were reviewed and updated in Epic as appropriate.        Objective     Infusions:  fentanyl 10 mcg/mL,  mcg/hr, Last Rate: 200 mcg/hr (01/07/25 1312)  heparin, 21 Units/kg/hr, Last Rate: 21  "Units/kg/hr (01/07/25 1316)  insulin, 0-100 Units/hr, Last Rate: 15.8 Units/hr (01/07/25 1203)  Midazolam 1 mg/mL 100mL NS, 1-10 mg/hr, Last Rate: 7 mg/hr (01/07/25 1305)  norepinephrine, 0.02-0.3 mcg/kg/min, Last Rate: 0.06 mcg/kg/min (01/07/25 1006)  Pharmacy to Dose Heparin,   Pharmacy to dose vancomycin,       Medications:  ampicillin-sulbactam, 3 g, Intravenous, Q12H  chlorhexidine, 15 mL, Mouth/Throat, Q12H  famotidine, 20 mg, Intravenous, BID  nystatin, , Topical, Q12H  senna-docusate sodium, 2 tablet, Nasogastric, BID  vancomycin (dosing per levels), , Not Applicable, Daily        Vital Sign Min/Max for last 24 hours  Temp  Min: 99.3 °F (37.4 °C)  Max: 100.9 °F (38.3 °C)   BP  Min: 92/65  Max: 144/58   Pulse  Min: 88  Max: 113   Resp  Min: 24  Max: 24   SpO2  Min: 90 %  Max: 98 %   No data recorded       Input/Output for last 24 hour shift  01/06 0701 - 01/07 0700  In: 3887.7 [I.V.:2134.7]  Out: 1495 [Urine:1495]   Mode: VC+/AC  FiO2 (%):  [50 %-60 %] 60 %  S RR:  [24] 24  S VT:  [380 mL] 380 mL  PEEP/CPAP (cm H2O):  [14 cm H20] 14 cm H20  MAP (cm H2O):  [19] 19  Objective:  General Appearance:  Uncomfortable, ill-appearing and in no acute distress.    Vital signs: (most recent): Blood pressure 138/53, pulse 99, temperature (!) 100.9 °F (38.3 °C), temperature source Axillary, resp. rate 24, height 162.6 cm (64.02\"), weight (!) 159 kg (350 lb 11.2 oz), SpO2 95%, not currently breastfeeding.    HEENT: (Endotracheal tube in place.)    Lungs:  Normal effort.  There are decreased breath sounds.  No rales, wheezes or rhonchi.    Heart: Normal rate.  Regular rhythm.  S1 normal and S2 normal.  No murmur.   Chest: Symmetric chest wall expansion.   Abdomen: Abdomen is soft and non-distended.  Bowel sounds are normal.   There is no abdominal tenderness.     Extremities: There is dependent edema.    Neurological: (Arousable.  Following very minimal commands.).    Pupils:  Pupils are equal, round, and reactive to light.  " Pupils are equal.   Skin:  Warm.                Results from last 7 days   Lab Units 01/07/25  0433 01/06/25  1956 01/06/25  1222 01/06/25 0319   WBC 10*3/mm3 8.54 8.24  --  9.06   HEMOGLOBIN g/dL 7.7* 7.5*  7.5* 7.0* 7.6*   PLATELETS 10*3/mm3 176 178  --  167     Results from last 7 days   Lab Units 01/07/25  0433 01/06/25  0319 01/05/25  0533 01/04/25  0434 01/03/25  0433 01/02/25  1538 01/02/25  1314   SODIUM mmol/L 145 142 143   < > 142  142  142   < > 136   POTASSIUM mmol/L 4.2 4.5 3.9   < > 4.2  4.2  4.2   < > 6.1*   CO2 mmol/L 19.0* 18.0* 22.0   < > 21.0*  24.0  24.0   < > 21.0*   BUN mg/dL 55* 49* 42*   < > 29*  28*  28*   < > 39*   CREATININE mg/dL 3.27* 2.99* 2.22*   < > 1.26*  1.29*  1.29*   < > 1.69*   MAGNESIUM mg/dL 1.9 1.8  --   --  2.0  2.0  --  1.7   PHOSPHORUS mg/dL  --  5.2*  --   --  2.8  2.7  --  4.5   GLUCOSE mg/dL 119* 118* 107*   < > 142*  147*  147*   < > 377*    < > = values in this interval not displayed.     Estimated Creatinine Clearance: 35.5 mL/min (A) (by C-G formula based on SCr of 3.27 mg/dL (H)).    Results from last 7 days   Lab Units 01/07/25  0329   PH, ARTERIAL pH units 7.204*   PCO2, ARTERIAL mm Hg 50.8*   PO2 ART mm Hg 82.1*         I reviewed the patient's results and images.     Assessment & Plan   Impression        Acute respiratory failure with hypercapnia    MARIAA (acute kidney injury)    Type 2 diabetes mellitus with hyperglycemia    Sepsis       Plan        Continue with current ventilatory support.  Likely continues to have elements of volume overload.  We will wean as able though she is still requiring very high support with a PEEP of 14 and an FiO2 of 60%.  Continue with current antibiotic therapy.  Will modify as necessary depending upon cultures.  Continue to monitor renal function.  Likely the patient is going to require renal replacement therapy very soon.  I will try to transition her from an insulin drip to subcutaneous coverage and modify as  necessary.  Hopefully this will help minimize volume.  Continue with current nutritional support.  The patient remains at a very high risk of worsening.  We will update family when available.    Plan of care and goals reviewed with mulitdisciplinary/antibiotic stewardship team during rounds.   I discussed the patient's findings and my recommendations with nursing staff     High level of risk due to:  drug(s) requiring intensive monitoring for toxicity and parenteral controlled substances.      Saji De Jesus MD, [facility]  Pulmonology and Critical Care Medicine

## 2025-01-07 NOTE — PLAN OF CARE
Goal Outcome Evaluation:  Plan of Care Reviewed With: patient, parent, child        Progress: no change    Remains intubated and sedated, on fentanyl and versed, insulin drip continues, heparin gtt started, monitor H/H, , no dialysis yet per nephrology, additional Bumex x 1, TF continues, BUE restraints dced, levo restarted, repeat BC positive for gm positive cocci in clusters in anerobic bottle, point of contact and decision maker changed to Faina Arzate ( pt's mother) per son's request.

## 2025-01-07 NOTE — PLAN OF CARE
Goal Outcome Evaluation:      Able to wean FiO2 to 50%. Remains on PEEP of 14.

## 2025-01-07 NOTE — PROGRESS NOTES
Pharmacy to Dose Heparin Infusion Note    Natalia Arzate is a 39 y.o. female receiving heparin infusion.     Therapy for (VTE/Cardiac): VTE  Patient Weight: 159 kg  Initial Bolus (Y/N): No   Any Bolus (Y/N): Yes    Signs or Symptoms of Bleeding: Heparin gtt previously held for dropping hemoglobin. Repeat hemoglobin 7.0 1/6. D/w MD Jerilyn garcia to restart heparin gtt.     VTE (PE/DVT)  Initial rate: 18 units/kg/hr (Max 1,500 units/hr)    Anti-Xa Bolus   Dose Infusion Hold   Time Infusion Rate Change (units/kg/hr) Repeat  Anti-Xa   < 0.11 50 units/kg  (4000 units Max) None Increase by  4 units/kg/hr 6 hours   0.11 - 0.19 25 units/kg  (2000 units Max) None Increase by  3 units/kg/hr 6 hours   0.2 - 0.29 0 None Increase by  2 units/kg/hr 6 hours   0.3 - 0.7 0 None No Change 6 hours (after 2 consecutive levels in range check qAM)   0.71 - 0.8 0 None Decrease by  1 units/kg/hr 6 hours   0.81 - 0.9 0 None Decrease by  2 units/kg/hr 6 hours   0.91 - 1 0 60 minutes Decrease by  3 units/kg/hr 6 hours   >1 0 Hold  After Anti-Xa less than 0.7 decrease previous rate by  4 units/kg/hr  Every 2 hours until Anti-Xa less than 0.7 then when infusion restarts in 6 hours     Results from last 7 days   Lab Units 01/07/25  0433 01/06/25  1956 01/06/25  1319 01/06/25  1222 01/06/25  0319 01/03/25  0433 01/02/25  1538   INR   --   --  1.24*  --   --   --  1.15*   HEMOGLOBIN g/dL 7.7* 7.5*  7.5*  --  7.0* 7.6*   < >  --    HEMATOCRIT % 26.8* 26.1*  25.4*  --  23.8* 26.3*   < >  --    PLATELETS 10*3/mm3 176 178  --   --  167   < >  --     < > = values in this interval not displayed.       Date   Time   Anti-Xa Current Rate (units/kg/hr) Bolus   (units) Rate Change   (units/kg/hr) New Rate (units/kg/hr) Repeat  Anti-Xa Comments /  Pump Check    1/2 1500 pending -- -- +9 9 0000 D/w RN; will await labs, but expect this to be an aPTT assay    1/2 N/a N/a 9 -- -- 9 2100 Baseline anti-Xa 0.1, re-timed next check for 2100.    1/2 2021 0.1 9 4000 +4  13 0400 DW RN   1/3 0500 0.10 13 4000 +4 17 1200 D/w RN   1/3 1200 0.19 17 4000 +1 18 2000 D/w RN   1/3 2035 0.23 18 -- +2 20 0400 D/w RN   1/4 0545 0.23 18 -- +2 22 1200 D/w RN     1/4   0815   HOLD   22   HOLD   HOLD   HOLD   HOLD D/w ROB Kinsey & APRN; hold heparin drip for now 2/2 H&H dropping; no overt bleeding noted;   MAR placeholder entered.   1/6 1300 pending -- -- +9 9 2000 D/w Dr. De Jesus and Tio, RN. Restart heparin gtt. Will not give initial bolus given concern for dropping hgb. Ok for future boluses.     1/6 1956 0.10 9 4000 +4 13 0600 DW RN. Continue watching hgb, currently stable   1/7 0600 0.10 13 4000 +4 17 1200 D/w RN   1/7 1204 0.10 17 4000 +4 21 2000 D/w ROB Simms, PharmD  1/7/2025  13:01 EST

## 2025-01-08 ENCOUNTER — APPOINTMENT (OUTPATIENT)
Dept: GENERAL RADIOLOGY | Facility: HOSPITAL | Age: 40
DRG: 870 | End: 2025-01-08
Payer: COMMERCIAL

## 2025-01-08 LAB
ALBUMIN SERPL-MCNC: 2.8 G/DL (ref 3.5–5.2)
ALBUMIN SERPL-MCNC: 3.1 G/DL (ref 3.5–5.2)
ALBUMIN/GLOB SERPL: 0.7 G/DL
ALP SERPL-CCNC: 273 U/L (ref 39–117)
ALT SERPL W P-5'-P-CCNC: 12 U/L (ref 1–33)
ANION GAP SERPL CALCULATED.3IONS-SCNC: 16 MMOL/L (ref 5–15)
ANION GAP SERPL CALCULATED.3IONS-SCNC: 19 MMOL/L (ref 5–15)
ANION GAP SERPL CALCULATED.3IONS-SCNC: 21 MMOL/L (ref 5–15)
ARTERIAL PATENCY WRIST A: ABNORMAL
AST SERPL-CCNC: 30 U/L (ref 1–32)
ATMOSPHERIC PRESS: ABNORMAL MM[HG]
BASE EXCESS BLDA CALC-SCNC: -10.7 MMOL/L (ref 0–2)
BASE EXCESS BLDA CALC-SCNC: -12.4 MMOL/L (ref 0–2)
BASE EXCESS BLDA CALC-SCNC: -12.6 MMOL/L (ref 0–2)
BASOPHILS # BLD AUTO: 0.04 10*3/MM3 (ref 0–0.2)
BASOPHILS NFR BLD AUTO: 0.4 % (ref 0–1.5)
BDY SITE: ABNORMAL
BILIRUB SERPL-MCNC: 0.3 MG/DL (ref 0–1.2)
BODY TEMPERATURE: 37
BUN SERPL-MCNC: 53 MG/DL (ref 6–20)
BUN SERPL-MCNC: 65 MG/DL (ref 6–20)
BUN SERPL-MCNC: 68 MG/DL (ref 6–20)
BUN/CREAT SERPL: 18.7 (ref 7–25)
BUN/CREAT SERPL: 19 (ref 7–25)
BUN/CREAT SERPL: 20.5 (ref 7–25)
CA-I SERPL ISE-MCNC: 1.11 MMOL/L (ref 1.15–1.3)
CALCIUM SPEC-SCNC: 8.3 MG/DL (ref 8.6–10.5)
CALCIUM SPEC-SCNC: 8.6 MG/DL (ref 8.6–10.5)
CALCIUM SPEC-SCNC: 8.8 MG/DL (ref 8.6–10.5)
CHLORIDE SERPL-SCNC: 103 MMOL/L (ref 98–107)
CHLORIDE SERPL-SCNC: 104 MMOL/L (ref 98–107)
CHLORIDE SERPL-SCNC: 106 MMOL/L (ref 98–107)
CO2 BLDA-SCNC: 17.3 MMOL/L (ref 22–33)
CO2 BLDA-SCNC: 17.3 MMOL/L (ref 22–33)
CO2 BLDA-SCNC: 19 MMOL/L (ref 22–33)
CO2 SERPL-SCNC: 17 MMOL/L (ref 22–29)
CO2 SERPL-SCNC: 19 MMOL/L (ref 22–29)
CO2 SERPL-SCNC: 21 MMOL/L (ref 22–29)
COHGB MFR BLD: 1.9 % (ref 0–2)
COHGB MFR BLD: 2.1 % (ref 0–2)
COHGB MFR BLD: 2.1 % (ref 0–2)
CREAT SERPL-MCNC: 2.58 MG/DL (ref 0.57–1)
CREAT SERPL-MCNC: 3.47 MG/DL (ref 0.57–1)
CREAT SERPL-MCNC: 3.57 MG/DL (ref 0.57–1)
DEPRECATED RDW RBC AUTO: 69.4 FL (ref 37–54)
EGFRCR SERPLBLD CKD-EPI 2021: 16.1 ML/MIN/1.73
EGFRCR SERPLBLD CKD-EPI 2021: 16.7 ML/MIN/1.73
EGFRCR SERPLBLD CKD-EPI 2021: 23.8 ML/MIN/1.73
EOSINOPHIL # BLD AUTO: 0.25 10*3/MM3 (ref 0–0.4)
EOSINOPHIL NFR BLD AUTO: 2.8 % (ref 0.3–6.2)
EPAP: 0
EPAP: 0
ERYTHROCYTE [DISTWIDTH] IN BLOOD BY AUTOMATED COUNT: 18.3 % (ref 12.3–15.4)
GLOBULIN UR ELPH-MCNC: 4.3 GM/DL
GLUCOSE BLDC GLUCOMTR-MCNC: 201 MG/DL (ref 70–130)
GLUCOSE BLDC GLUCOMTR-MCNC: 205 MG/DL (ref 70–130)
GLUCOSE BLDC GLUCOMTR-MCNC: 235 MG/DL (ref 70–130)
GLUCOSE BLDC GLUCOMTR-MCNC: 264 MG/DL (ref 70–130)
GLUCOSE BLDC GLUCOMTR-MCNC: 271 MG/DL (ref 70–130)
GLUCOSE SERPL-MCNC: 206 MG/DL (ref 65–99)
GLUCOSE SERPL-MCNC: 214 MG/DL (ref 65–99)
GLUCOSE SERPL-MCNC: 275 MG/DL (ref 65–99)
HCO3 BLDA-SCNC: 15.8 MMOL/L (ref 20–26)
HCO3 BLDA-SCNC: 15.8 MMOL/L (ref 20–26)
HCO3 BLDA-SCNC: 17.5 MMOL/L (ref 20–26)
HCT VFR BLD AUTO: 25.9 % (ref 34–46.6)
HCT VFR BLD CALC: 22.2 % (ref 38–51)
HCT VFR BLD CALC: 22.7 % (ref 38–51)
HCT VFR BLD CALC: 22.7 % (ref 38–51)
HGB BLD-MCNC: 7.2 G/DL (ref 12–15.9)
HGB BLDA-MCNC: 7.2 G/DL (ref 14–18)
HGB BLDA-MCNC: 7.4 G/DL (ref 14–18)
HGB BLDA-MCNC: 7.4 G/DL (ref 14–18)
HYPOCHROMIA BLD QL: NORMAL
IMM GRANULOCYTES # BLD AUTO: 0.32 10*3/MM3 (ref 0–0.05)
IMM GRANULOCYTES NFR BLD AUTO: 3.6 % (ref 0–0.5)
INHALED O2 CONCENTRATION: 50 %
IPAP: 0
IPAP: 0
LYMPHOCYTES # BLD AUTO: 1.66 10*3/MM3 (ref 0.7–3.1)
LYMPHOCYTES NFR BLD AUTO: 18.5 % (ref 19.6–45.3)
Lab: ABNORMAL
MAGNESIUM SERPL-MCNC: 1.8 MG/DL (ref 1.6–2.6)
MAGNESIUM SERPL-MCNC: 1.9 MG/DL (ref 1.6–2.6)
MCH RBC QN AUTO: 28.9 PG (ref 26.6–33)
MCHC RBC AUTO-ENTMCNC: 27.8 G/DL (ref 31.5–35.7)
MCV RBC AUTO: 104 FL (ref 79–97)
METHGB BLD QL: 0.3 % (ref 0–1.5)
METHGB BLD QL: 0.3 % (ref 0–1.5)
METHGB BLD QL: 0.4 % (ref 0–1.5)
MODALITY: ABNORMAL
MONOCYTES # BLD AUTO: 1.18 10*3/MM3 (ref 0.1–0.9)
MONOCYTES NFR BLD AUTO: 13.1 % (ref 5–12)
NEUTROPHILS NFR BLD AUTO: 5.54 10*3/MM3 (ref 1.7–7)
NEUTROPHILS NFR BLD AUTO: 61.6 % (ref 42.7–76)
NOTIFIED BY: ABNORMAL
NOTIFIED WHO: ABNORMAL
NRBC BLD AUTO-RTO: 0.3 /100 WBC (ref 0–0.2)
OXYHGB MFR BLDV: 91.3 % (ref 94–99)
OXYHGB MFR BLDV: 92.1 % (ref 94–99)
OXYHGB MFR BLDV: 94.1 % (ref 94–99)
PAW @ PEAK INSP FLOW SETTING VENT: 0 CMH2O
PAW @ PEAK INSP FLOW SETTING VENT: 0 CMH2O
PCO2 BLDA: 47.4 MM HG (ref 35–45)
PCO2 BLDA: 48.5 MM HG (ref 35–45)
PCO2 BLDA: 50.3 MM HG (ref 35–45)
PCO2 TEMP ADJ BLD: 47.4 MM HG (ref 35–45)
PCO2 TEMP ADJ BLD: 48.5 MM HG (ref 35–45)
PCO2 TEMP ADJ BLD: 50.3 MM HG (ref 35–45)
PEEP RESPIRATORY: 14 CM[H2O]
PH BLDA: 7.12 PH UNITS (ref 7.35–7.45)
PH BLDA: 7.13 PH UNITS (ref 7.35–7.45)
PH BLDA: 7.15 PH UNITS (ref 7.35–7.45)
PH, TEMP CORRECTED: 7.12 PH UNITS
PH, TEMP CORRECTED: 7.13 PH UNITS
PH, TEMP CORRECTED: 7.15 PH UNITS
PHOSPHATE SERPL-MCNC: 6.5 MG/DL (ref 2.5–4.5)
PHOSPHATE SERPL-MCNC: 6.5 MG/DL (ref 2.5–4.5)
PLAT MORPH BLD: NORMAL
PLATELET # BLD AUTO: 177 10*3/MM3 (ref 140–450)
PMV BLD AUTO: 9.8 FL (ref 6–12)
PO2 BLDA: 75.6 MM HG (ref 83–108)
PO2 BLDA: 76.7 MM HG (ref 83–108)
PO2 BLDA: 87.9 MM HG (ref 83–108)
PO2 TEMP ADJ BLD: 75.6 MM HG (ref 83–108)
PO2 TEMP ADJ BLD: 76.7 MM HG (ref 83–108)
PO2 TEMP ADJ BLD: 87.9 MM HG (ref 83–108)
POTASSIUM SERPL-SCNC: 5 MMOL/L (ref 3.5–5.2)
POTASSIUM SERPL-SCNC: 5.1 MMOL/L (ref 3.5–5.2)
POTASSIUM SERPL-SCNC: 5.6 MMOL/L (ref 3.5–5.2)
PROT SERPL-MCNC: 7.4 G/DL (ref 6–8.5)
RBC # BLD AUTO: 2.49 10*6/MM3 (ref 3.77–5.28)
SODIUM SERPL-SCNC: 141 MMOL/L (ref 136–145)
SODIUM SERPL-SCNC: 142 MMOL/L (ref 136–145)
SODIUM SERPL-SCNC: 143 MMOL/L (ref 136–145)
TOTAL RATE: 24 BREATHS/MINUTE
TOTAL RATE: 24 BREATHS/MINUTE
UFH PPP CHRO-ACNC: 0.23 IU/ML (ref 0.3–0.7)
UFH PPP CHRO-ACNC: 0.27 IU/ML (ref 0.3–0.7)
UFH PPP CHRO-ACNC: 0.31 IU/ML (ref 0.3–0.7)
VANCOMYCIN SERPL-MCNC: 17 MCG/ML (ref 5–40)
VENTILATOR MODE: ABNORMAL
VT ON VENT VENT: 0.38 ML
WBC MORPH BLD: NORMAL
WBC NRBC COR # BLD AUTO: 8.99 10*3/MM3 (ref 3.4–10.8)

## 2025-01-08 PROCEDURE — 02HV33Z INSERTION OF INFUSION DEVICE INTO SUPERIOR VENA CAVA, PERCUTANEOUS APPROACH: ICD-10-PCS

## 2025-01-08 PROCEDURE — 25010000002 HEPARIN (PORCINE) 25000-0.45 UT/250ML-% SOLUTION

## 2025-01-08 PROCEDURE — 85520 HEPARIN ASSAY: CPT

## 2025-01-08 PROCEDURE — 82375 ASSAY CARBOXYHB QUANT: CPT

## 2025-01-08 PROCEDURE — 25010000002 AMPICILLIN-SULBACTAM PER 1.5 G: Performed by: INTERNAL MEDICINE

## 2025-01-08 PROCEDURE — 36556 INSERT NON-TUNNEL CV CATH: CPT

## 2025-01-08 PROCEDURE — 80048 BASIC METABOLIC PNL TOTAL CA: CPT

## 2025-01-08 PROCEDURE — 87040 BLOOD CULTURE FOR BACTERIA: CPT | Performed by: INTERNAL MEDICINE

## 2025-01-08 PROCEDURE — 82330 ASSAY OF CALCIUM: CPT | Performed by: INTERNAL MEDICINE

## 2025-01-08 PROCEDURE — 25810000003 SODIUM CHLORIDE 0.9 % SOLUTION 250 ML FLEX CONT

## 2025-01-08 PROCEDURE — 25010000002 BUMETANIDE PER 0.5 MG: Performed by: INTERNAL MEDICINE

## 2025-01-08 PROCEDURE — 25010000002 VANCOMYCIN 1.75-0.9 GM/500ML-% SOLUTION

## 2025-01-08 PROCEDURE — 82805 BLOOD GASES W/O2 SATURATION: CPT

## 2025-01-08 PROCEDURE — 25010000003 DEXTROSE 5 % SOLUTION 1,000 ML FLEX CONT: Performed by: NURSE PRACTITIONER

## 2025-01-08 PROCEDURE — 84100 ASSAY OF PHOSPHORUS: CPT | Performed by: INTERNAL MEDICINE

## 2025-01-08 PROCEDURE — 83735 ASSAY OF MAGNESIUM: CPT

## 2025-01-08 PROCEDURE — 25810000003 SODIUM CHLORIDE 0.9 % SOLUTION: Performed by: NURSE PRACTITIONER

## 2025-01-08 PROCEDURE — 71045 X-RAY EXAM CHEST 1 VIEW: CPT

## 2025-01-08 PROCEDURE — 25010000002 PHENYLEPHRINE 10 MG/ML SOLUTION 5 ML VIAL

## 2025-01-08 PROCEDURE — 25010000002 MAGNESIUM SULFATE 2 GM/50ML SOLUTION

## 2025-01-08 PROCEDURE — 93005 ELECTROCARDIOGRAM TRACING: CPT

## 2025-01-08 PROCEDURE — 85007 BL SMEAR W/DIFF WBC COUNT: CPT | Performed by: INTERNAL MEDICINE

## 2025-01-08 PROCEDURE — 83050 HGB METHEMOGLOBIN QUAN: CPT

## 2025-01-08 PROCEDURE — 25010000002 MIDAZOLAM 1 MG/ML 100ML NS 100 MG/100ML SOLUTION

## 2025-01-08 PROCEDURE — 76937 US GUIDE VASCULAR ACCESS: CPT

## 2025-01-08 PROCEDURE — 82948 REAGENT STRIP/BLOOD GLUCOSE: CPT

## 2025-01-08 PROCEDURE — 63710000001 INSULIN GLARGINE PER 5 UNITS: Performed by: INTERNAL MEDICINE

## 2025-01-08 PROCEDURE — 85025 COMPLETE CBC W/AUTO DIFF WBC: CPT | Performed by: INTERNAL MEDICINE

## 2025-01-08 PROCEDURE — 93010 ELECTROCARDIOGRAM REPORT: CPT | Performed by: INTERNAL MEDICINE

## 2025-01-08 PROCEDURE — B548ZZA ULTRASONOGRAPHY OF SUPERIOR VENA CAVA, GUIDANCE: ICD-10-PCS

## 2025-01-08 PROCEDURE — 80053 COMPREHEN METABOLIC PANEL: CPT | Performed by: INTERNAL MEDICINE

## 2025-01-08 PROCEDURE — 83735 ASSAY OF MAGNESIUM: CPT | Performed by: INTERNAL MEDICINE

## 2025-01-08 PROCEDURE — 94003 VENT MGMT INPAT SUBQ DAY: CPT

## 2025-01-08 PROCEDURE — 94799 UNLISTED PULMONARY SVC/PX: CPT

## 2025-01-08 PROCEDURE — 99291 CRITICAL CARE FIRST HOUR: CPT | Performed by: INTERNAL MEDICINE

## 2025-01-08 PROCEDURE — 74018 RADEX ABDOMEN 1 VIEW: CPT

## 2025-01-08 PROCEDURE — 25010000002 FENTANYL CITRATE (PF) 2500 MCG/50ML SOLUTION: Performed by: NURSE PRACTITIONER

## 2025-01-08 PROCEDURE — 80202 ASSAY OF VANCOMYCIN: CPT

## 2025-01-08 PROCEDURE — 63710000001 INSULIN REGULAR HUMAN PER 5 UNITS: Performed by: INTERNAL MEDICINE

## 2025-01-08 RX ORDER — CALCIUM CHLORIDE, MAGNESIUM CHLORIDE, SODIUM CHLORIDE, SODIUM BICARBONATE, POTASSIUM CHLORIDE AND SODIUM PHOSPHATE DIBASIC DIHYDRATE 3.68; 3.05; 6.34; 3.09; .314; .187 G/L; G/L; G/L; G/L; G/L; G/L
1000 INJECTION INTRAVENOUS CONTINUOUS
Status: DISCONTINUED | OUTPATIENT
Start: 2025-01-08 | End: 2025-01-11

## 2025-01-08 RX ORDER — CALCIUM CHLORIDE, MAGNESIUM CHLORIDE, SODIUM CHLORIDE, SODIUM BICARBONATE, POTASSIUM CHLORIDE AND SODIUM PHOSPHATE DIBASIC DIHYDRATE 3.68; 3.05; 6.34; 3.09; .314; .187 G/L; G/L; G/L; G/L; G/L; G/L
1500 INJECTION INTRAVENOUS CONTINUOUS
Status: DISCONTINUED | OUTPATIENT
Start: 2025-01-08 | End: 2025-01-11

## 2025-01-08 RX ORDER — ANTICOAGULANT CITRATE DEXTROSE SOLUTION FORMULA A 12.25; 11; 3.65 G/500ML; G/500ML; G/500ML
0-2 SOLUTION INTRAVENOUS AS NEEDED
Status: CANCELLED | OUTPATIENT
Start: 2025-01-08

## 2025-01-08 RX ORDER — CALCIUM CHLORIDE, MAGNESIUM CHLORIDE, SODIUM CHLORIDE, SODIUM BICARBONATE, POTASSIUM CHLORIDE AND SODIUM PHOSPHATE DIBASIC DIHYDRATE 3.68; 3.05; 6.34; 3.09; .314; .187 G/L; G/L; G/L; G/L; G/L; G/L
INJECTION INTRAVENOUS CONTINUOUS
Status: CANCELLED | OUTPATIENT
Start: 2025-01-08

## 2025-01-08 RX ORDER — BISACODYL 10 MG
10 SUPPOSITORY, RECTAL RECTAL DAILY PRN
Status: DISCONTINUED | OUTPATIENT
Start: 2025-01-08 | End: 2025-01-17

## 2025-01-08 RX ORDER — ANTICOAGULANT CITRATE DEXTROSE SOLUTION FORMULA A 12.25; 11; 3.65 G/500ML; G/500ML; G/500ML
0-2 SOLUTION INTRAVENOUS AS NEEDED
Status: DISCONTINUED | OUTPATIENT
Start: 2025-01-08 | End: 2025-01-11

## 2025-01-08 RX ORDER — HEPARIN SODIUM 10000 [USP'U]/100ML
25 INJECTION, SOLUTION INTRAVENOUS
Status: DISCONTINUED | OUTPATIENT
Start: 2025-01-08 | End: 2025-01-20

## 2025-01-08 RX ORDER — AMOXICILLIN 250 MG
2 CAPSULE ORAL 2 TIMES DAILY
Status: DISCONTINUED | OUTPATIENT
Start: 2025-01-08 | End: 2025-01-17

## 2025-01-08 RX ORDER — VANCOMYCIN 1.75 GRAM/500 ML IN 0.9 % SODIUM CHLORIDE INTRAVENOUS
1750 EVERY 24 HOURS
Status: DISCONTINUED | OUTPATIENT
Start: 2025-01-08 | End: 2025-01-10

## 2025-01-08 RX ORDER — POLYETHYLENE GLYCOL 3350 17 G/17G
17 POWDER, FOR SOLUTION ORAL DAILY
Status: DISCONTINUED | OUTPATIENT
Start: 2025-01-08 | End: 2025-01-17

## 2025-01-08 RX ORDER — MAGNESIUM SULFATE HEPTAHYDRATE 40 MG/ML
2 INJECTION, SOLUTION INTRAVENOUS ONCE
Status: COMPLETED | OUTPATIENT
Start: 2025-01-08 | End: 2025-01-08

## 2025-01-08 RX ADMIN — SODIUM BICARBONATE 150 MEQ: 84 INJECTION, SOLUTION INTRAVENOUS at 05:51

## 2025-01-08 RX ADMIN — MIDAZOLAM IN SODIUM CHLORIDE 6 MG/HR: 1 INJECTION INTRAVENOUS at 07:14

## 2025-01-08 RX ADMIN — ACETAMINOPHEN 650 MG: 650 SOLUTION ORAL at 01:18

## 2025-01-08 RX ADMIN — INSULIN HUMAN 6 UNITS: 100 INJECTION, SOLUTION PARENTERAL at 12:50

## 2025-01-08 RX ADMIN — CALCIUM CHLORIDE, MAGNESIUM CHLORIDE, SODIUM CHLORIDE, SODIUM BICARBONATE, POTASSIUM CHLORIDE AND SODIUM PHOSPHATE DIBASIC DIHYDRATE 1500 ML/HR: 3.68; 3.05; 6.34; 3.09; .314; .187 INJECTION INTRAVENOUS at 20:45

## 2025-01-08 RX ADMIN — INSULIN HUMAN 6 UNITS: 100 INJECTION, SOLUTION PARENTERAL at 18:52

## 2025-01-08 RX ADMIN — INSULIN HUMAN 10 UNITS: 100 INJECTION, SOLUTION PARENTERAL at 18:52

## 2025-01-08 RX ADMIN — AMPICILLIN SODIUM, SULBACTAM SODIUM 3 G: 2; 1 INJECTION, POWDER, FOR SOLUTION INTRAMUSCULAR; INTRAVENOUS at 15:50

## 2025-01-08 RX ADMIN — INSULIN GLARGINE 35 UNITS: 100 INJECTION, SOLUTION SUBCUTANEOUS at 20:42

## 2025-01-08 RX ADMIN — POLYETHYLENE GLYCOL 3350 17 G: 17 POWDER, FOR SOLUTION ORAL at 08:47

## 2025-01-08 RX ADMIN — FAMOTIDINE 20 MG: 10 INJECTION, SOLUTION INTRAVENOUS at 08:47

## 2025-01-08 RX ADMIN — NYSTATIN: 100000 POWDER TOPICAL at 08:48

## 2025-01-08 RX ADMIN — AMPICILLIN SODIUM, SULBACTAM SODIUM 3 G: 2; 1 INJECTION, POWDER, FOR SOLUTION INTRAMUSCULAR; INTRAVENOUS at 02:30

## 2025-01-08 RX ADMIN — SODIUM BICARBONATE 50 MEQ: 84 INJECTION INTRAVENOUS at 05:40

## 2025-01-08 RX ADMIN — INSULIN HUMAN 10 UNITS: 100 INJECTION, SOLUTION PARENTERAL at 00:28

## 2025-01-08 RX ADMIN — Medication 100 MCG/HR: at 07:14

## 2025-01-08 RX ADMIN — SENNOSIDES AND DOCUSATE SODIUM 2 TABLET: 50; 8.6 TABLET ORAL at 20:41

## 2025-01-08 RX ADMIN — HEPARIN SODIUM 24 UNITS/KG/HR: 10000 INJECTION, SOLUTION INTRAVENOUS at 05:48

## 2025-01-08 RX ADMIN — NYSTATIN 1 APPLICATION: 100000 POWDER TOPICAL at 20:45

## 2025-01-08 RX ADMIN — Medication 1750 MG: at 19:06

## 2025-01-08 RX ADMIN — CHLORHEXIDINE GLUCONATE 0.12% ORAL RINSE 15 ML: 1.2 LIQUID ORAL at 08:47

## 2025-01-08 RX ADMIN — BUMETANIDE 2 MG: 0.25 INJECTION INTRAMUSCULAR; INTRAVENOUS at 15:50

## 2025-01-08 RX ADMIN — HEPARIN SODIUM 28 UNITS/KG/HR: 10000 INJECTION, SOLUTION INTRAVENOUS at 21:55

## 2025-01-08 RX ADMIN — CALCIUM CHLORIDE, MAGNESIUM CHLORIDE, SODIUM CHLORIDE, SODIUM BICARBONATE, POTASSIUM CHLORIDE AND SODIUM PHOSPHATE DIBASIC DIHYDRATE 1500 ML/HR: 3.68; 3.05; 6.34; 3.09; .314; .187 INJECTION INTRAVENOUS at 16:49

## 2025-01-08 RX ADMIN — PHENYLEPHRINE HYDROCHLORIDE 0.5 MCG/KG/MIN: 10 INJECTION INTRAVENOUS at 21:18

## 2025-01-08 RX ADMIN — SENNOSIDES AND DOCUSATE SODIUM 2 TABLET: 50; 8.6 TABLET ORAL at 08:47

## 2025-01-08 RX ADMIN — INSULIN GLARGINE 35 UNITS: 100 INJECTION, SOLUTION SUBCUTANEOUS at 08:47

## 2025-01-08 RX ADMIN — INSULIN HUMAN 4 UNITS: 100 INJECTION, SOLUTION PARENTERAL at 00:29

## 2025-01-08 RX ADMIN — INSULIN HUMAN 10 UNITS: 100 INJECTION, SOLUTION PARENTERAL at 06:58

## 2025-01-08 RX ADMIN — NALOXEGOL OXALATE 25 MG: 25 TABLET, FILM COATED ORAL at 18:52

## 2025-01-08 RX ADMIN — INSULIN HUMAN 10 UNITS: 100 INJECTION, SOLUTION PARENTERAL at 12:50

## 2025-01-08 RX ADMIN — NOREPINEPHRINE BITARTRATE 0.05 MCG/KG/MIN: 0.03 INJECTION, SOLUTION INTRAVENOUS at 18:00

## 2025-01-08 RX ADMIN — HEPARIN SODIUM 26 UNITS/KG/HR: 10000 INJECTION, SOLUTION INTRAVENOUS at 13:31

## 2025-01-08 RX ADMIN — NOREPINEPHRINE BITARTRATE 0.06 MCG/KG/MIN: 0.03 INJECTION, SOLUTION INTRAVENOUS at 03:57

## 2025-01-08 RX ADMIN — BUMETANIDE 2 MG: 0.25 INJECTION INTRAMUSCULAR; INTRAVENOUS at 01:56

## 2025-01-08 RX ADMIN — CALCIUM CHLORIDE, MAGNESIUM CHLORIDE, SODIUM CHLORIDE, SODIUM BICARBONATE, POTASSIUM CHLORIDE AND SODIUM PHOSPHATE DIBASIC DIHYDRATE 1500 ML/HR: 3.68; 3.05; 6.34; 3.09; .314; .187 INJECTION INTRAVENOUS at 20:42

## 2025-01-08 RX ADMIN — CHLORHEXIDINE GLUCONATE 0.12% ORAL RINSE 15 ML: 1.2 LIQUID ORAL at 20:41

## 2025-01-08 RX ADMIN — MAGNESIUM SULFATE HEPTAHYDRATE 2 G: 2 INJECTION, SOLUTION INTRAVENOUS at 21:20

## 2025-01-08 RX ADMIN — FAMOTIDINE 20 MG: 10 INJECTION, SOLUTION INTRAVENOUS at 20:41

## 2025-01-08 NOTE — PLAN OF CARE
Goal Outcome Evaluation:           Progress: declining  Outcome Evaluation: VSS, Pt fever controlled with tylenol, ice pack and tepid bath. Total uop was 327ml for the shift despite receiving bumex 2mg. Morning BUN and creatinine more elevated from yesterday. Pt experiencing high Tube feeding residuals 695ml and 800ml. 400ml of the last residual check was returned. Very difficult to discern if lower quadrant bowel sounds  are present, if present they are extremely faint and tinkling in quality of sound. Pt did receive a 2000unit bolus of heparin and gtt continues to be increased with each check. Pt's abg showed a pH of 7.1, 1 amp of bicarb administered and a drip initiated at 75ml/hr. Current infusions are as follows:   Fentanyl 100mcg/hr  Versed 6mg/hr  Heparin 226u/kg/hr  Bicarb 75ml/hr

## 2025-01-08 NOTE — NURSING NOTE
WOC consulted for wound to left flank    Patient has what appears to be ITD within her left flank skin fold.  There is a purpleish hue to this ITD but it does appear superficial.  Recommend applying topical barrier cream- Z guard to this area.  Skin clean dry as possible.        Additionally she does have some chronic appearing wounds to her sacrococcygeal area.  Scar tissue noted near the coccyx.  There is a small open area at the sacrum that is bleeding slightly.  Difficult to determine if there is some sort of abscess in this area.  Hemostasis achieved.  New Allevyn dressing applied.        Pressure injury prevention protocol.    Turn with foam wedge.  Elevate heels off bed with offloading heel boots.    WOC will follow.    Miles Tanner RN, BSN, CCRN, CWOCN  Wound, Ostomy and Continence (WOC) Department  [facility]

## 2025-01-08 NOTE — PROGRESS NOTES
Clinical Nutrition     Patient Name: Natalia Arzate  YOB: 1985  MRN: [id number]  Date of Encounter: 25 15:27 EST  Admission date: 2025  Reason for Visit: MDR, Follow-up protocol, EN    Assessment   Nutrition Assessment   Admission Diagnosis:  Acute respiratory failure [J96.00]    Problem List:    Acute respiratory failure with hypercapnia    MARIAA (acute kidney injury)    Type 2 diabetes mellitus with hyperglycemia    Sepsis      PMH:   She  has a past medical history of A-fib, Abnormal ECG, Anemia, Anxiety, Asthma, Cancer, Depression, Diabetes mellitus, DVT (deep venous thrombosis), Factor 5 Leiden mutation, heterozygous, Fibroid, GERD (gastroesophageal reflux disease), Gout, H/O abdominal abscess, History of sepsis, History of transfusion, Hyperlipidemia, Hypothyroid, Kidney stone, Migraines, Neuropathy, Ovarian cancer (2021), Ovarian cyst, PE (pulmonary embolism), Polycystic ovary syndrome, Preeclampsia, Rh incompatibility, Stroke, TIA (transient ischemic attack), Urinary tract infection, and Varicella.    PSH:  She  has a past surgical history that includes  section; Cholecystectomy; Colonoscopy; Cardiac catheterization; Right oophorectomy; Abdominal surgery; Esophagogastroduodenoscopy; ureteroscopy laser lithotripsy with stent insertion (Left, 10/01/2021); Extracorporeal shock wave lithotripsy (Left, 10/22/2021); Lithotripsy; ureteroscopy laser lithotripsy with stent insertion (Left, 2022); ureteroscopy laser lithotripsy with stent insertion (Left, 2022); Nephrectomy (Left, 10/2022); and Hysterectomy ().    Substance history: Opioids    Applicable Nutrition History:     ARF/VENT  25    s/p Bronch 25    MARIAA- plan to start CRRT 24    SKIN: L flank - ITD, coccyx- areas of scarring, sacrum small open area that is bleeding per WOC    Labs    Labs Reviewed: Yes    Results from last 7 days   Lab Units 25  0308 25  0436  01/06/25 0319 01/05/25  0533 01/04/25 0434 01/03/25 2035 01/03/25 0433 01/03/25  0018 01/02/25 2021   GLUCOSE mg/dL 214* 119* 118* 107* 107*  --  142*  147*  147*   < > 234*   BUN mg/dL 65* 55* 49* 42* 37*  --  29*  28*  28*   < > 34*   CREATININE mg/dL 3.47* 3.27* 2.99* 2.22* 1.99*  --  1.26*  1.29*  1.29*   < > 1.38*   SODIUM mmol/L 141 145 142 143 144  --  142  142  142   < > 134*   CHLORIDE mmol/L 103 108* 104 106 110*  --  105  105  105   < > 98   POTASSIUM mmol/L 5.1 4.2 4.5 3.9 4.3  --  4.2  4.2  4.2   < > 4.7   PHOSPHORUS mg/dL 6.5*  --  5.2*  --   --   --  2.8  2.7  --   --    MAGNESIUM mg/dL  --  1.9 1.8  --   --   --  2.0  2.0  --   --    ALT (SGPT) U/L 12  --   --  24 34*  --  54*  54*  --   --    LACTATE mmol/L  --   --   --   --  0.7 1.0  --   --  2.8*    < > = values in this interval not displayed.       Results from last 7 days   Lab Units 01/08/25  0308 01/05/25 0533 01/04/25 0434 01/03/25 0433   ALBUMIN g/dL 3.1* 3.2* 3.0* 3.3*  3.3*   PREALBUMIN mg/dL  --   --  12.4*  --    CRP mg/dL  --   --   --  22.72*   CHOLESTEROL mg/dL  --   --   --  111   TRIGLYCERIDES mg/dL  --   --   --  428*       Results from last 7 days   Lab Units 01/08/25  1211 01/08/25  0534 01/08/25  0004 01/07/25  1741 01/07/25  1410 01/07/25  1201   GLUCOSE mg/dL 264* 235* 201* 142* 116 128     Lab Results   Lab Value Date/Time    HGBA1C 9.10 (H) 01/01/2025 2133    HGBA1C 9.6 (H) 12/16/2024 1154    HGBA1C 9.9 (H) 10/15/2024 0505     Results from last 7 days   Lab Units 01/02/25  1314   URIC ACID mg/dL 7.6*     Results from last 7 days   Lab Units 01/02/25  0315 01/01/25  2039   PROBNP pg/mL 6,035.0* 5,671.0*       Medications    Medications Reviewed: yes    Scheduled Meds:ampicillin-sulbactam, 3 g, Intravenous, Q12H  bumetanide, 2 mg, Intravenous, Q12H  chlorhexidine, 15 mL, Mouth/Throat, Q12H  famotidine, 20 mg, Intravenous, BID  insulin glargine, 35 Units, Subcutaneous, Q12H  insulin regular, 10 Units,  Subcutaneous, Q6H  insulin regular, 2-9 Units, Subcutaneous, Q6H  nystatin, , Topical, Q12H  senna-docusate sodium, 2 tablet, Nasogastric, BID   And  polyethylene glycol, 17 g, Nasogastric, Daily  vancomycin, 1,750 mg, Intravenous, Q24H      Continuous Infusions:fentanyl 10 mcg/mL,  mcg/hr, Last Rate: 100 mcg/hr (01/08/25 0714)  heparin, 26 Units/kg/hr, Last Rate: 26 Units/kg/hr (01/08/25 1331)  Midazolam 1 mg/mL 100mL NS, 1-10 mg/hr, Last Rate: 2 mg/hr (01/08/25 1300)  norepinephrine, 0.02-0.3 mcg/kg/min, Last Rate: 0.06 mcg/kg/min (01/08/25 0357)  Pharmacy to Dose Heparin,   Pharmacy to dose vancomycin,   Phoxillum BK4/2.5, 1,500 mL/hr  Phoxillum BK4/2.5, 1,500 mL/hr  Phoxillum BK4/2.5, 1,500 mL/hr  sodium bicarbonate 8.4 % 150 mEq in dextrose (D5W) 5 % 1,000 mL infusion (greater than 100 mEq), 150 mEq, Last Rate: 150 mEq (01/08/25 0551)      PRN Meds:.  acetaminophen    anticoagulant citrate (ACD) formula A    senna-docusate sodium **AND** polyethylene glycol **AND** [DISCONTINUED] bisacodyl **AND** bisacodyl    Calcium Replacement - Follow Nurse / BPA Driven Protocol    dextrose    dextrose    diphenhydrAMINE    EPINEPHrine    EPINEPHrine    glucagon (human recombinant)    ipratropium    ipratropium-albuterol    Magnesium Standard Dose Replacement - Follow Nurse / BPA Driven Protocol    methylPREDNISolone sodium succinate    Pharmacy to Dose Heparin    Pharmacy to dose vancomycin    Phosphorus Replacement - Follow Nurse / BPA Driven Protocol    Potassium Replacement - Follow Nurse / BPA Driven Protocol    sodium chloride    Intake/Ouptut 24 hrs (0701 - 0700)   I&O's Reviewed: yes    Intake & Output (last day)         01/07 0701 01/08 0700 01/08 0701 01/09 0700    I.V. (mL/kg) 2032.6 (10.8) 1123 (6)    Other 541     NG/GT 1503     IV Piggyback 200     Total Intake(mL/kg) 4276.6 (22.7) 1123 (6)    Urine (mL/kg/hr) 2077 (0.5) 250 (0.2)    Total Output 2077 250    Net +2199.6 +873                "  Anthropometrics     Height: Height: 162.6 cm (64.02\")64in  Last Filed Weight: Weight: (!) 188 kg (414 lb 12.8 oz) (01/08/25 0600)350lb  Method: Weight Method: Bed scaleest  BMI: BMI (Calculated): 71.260    UBW: last MD office weight 336lb    Weight change:  ? 27lb wt gain since January     Weight       Weight (kg) Weight (lbs) Weight Method Visit Report   5/24/2023 129.729 kg  286 lb  Stated  --     127.007 kg  280 lb   --    6/24/2023 127.007 kg  280 lb  Stated     7/6/2023 --  --   --    7/26/2023 129.275 kg  285 lb      7/27/2023 132.904 kg  293 lb   --    8/15/2023 124.739 kg  275 lb  Stated     8/17/2023 127.007 kg  280 lb  Stated     9/10/2023 129.275 kg  285 lb  Stated     10/6/2023 140.161 kg (H)  309 lb (H)   --    11/7/2023 133.811 kg  295 lb  Stated     11/20/2023 133.811 kg  295 lb      11/24/2023 133.358 kg  294 lb      11/26/2023 133.811 kg  295 lb  Stated     1/2/2024 140.161 kg (H)  309 lb (H)  Stated     2/13/2024 145.605 kg (H)  321 lb (H)  Stated     3/12/2024 --  --   --    4/16/2024 142.611 kg (H)  314 lb 6.4 oz (H)   --    4/18/2024 142.429 kg (H)  314 lb (H)  Stated     4/26/2024 138.801 kg (H)  306 lb (H)  Stated     5/1/2024 138.801 kg (H)  306 lb (H)   --    5/6/2024 146.512 kg (H)  323 lb (H)   --    5/13/2024 146.512 kg (H)  323 lb (H)  Stated     7/8/2024 142.883 kg (H)  315 lb (H)  Stated     7/31/2024 147.691 kg (H)  325 lb 9.6 oz (H)   --    9/22/2024 143.79 kg (H)  317 lb (H)  Stated     12/3/2024 153.588 kg (H)  338 lb 9.6 oz (H)   --    12/16/2024 153.316 kg (H)  338 lb (H)   --     152.409 kg (H)  336 lb (H)   --    1/1/2025 158.759 kg (H)  350 lb (H)  Estimated        Legend:  (H) High  Nutrition Focused Physical Exam     Date:     Unable to perform due to Pt unable to participate at time of visit     Subjective   Reported/Observed/Food/Nutrition Related History:     1-8: pt intubated, sedated + fentanyl, versed,levophed 0.06mcg, bicarb @75ml/hr    Per RN: pt had 800ml GRV last " "night TF on hold, is super acidotic, line placed for dialysis, plan to start CRRT     1-6: pt intubated, sedated, fiancee at bedside  + fentanyl, versed, insulin    Per RN:pt s/p emergent bronch Saturday,  trophic feed started yesterday    Per MD ok to advance TF    1-3: Pt intubated, sedated, fiancee at bedside  + insulin, fentanyl, versed, heparin, amio    Per RN: pt had wide complex rhythm last night, lokelma on hold as K+ wnl, 103 temp    Per MD ok to start TF,  place keofeed    Needs Assessment   Date: 1-3-25    Height used:Height: 162.6 cm (64.02\")64in  Weights used/ ABW: 336lb/ 153kg   IBW: 120lb/ 55kg    Estimated Calorie needs: ~1500kcal  Method:  22-25Kcals/KG IBW: 1210-1375kcal  Method:  MSJ ABW: 2195kcal  Method:  PSU ABW: 2745kcal    Estimated Protein needs: ~ 138g protein  Method: 2.5g protein per kg IBW: 138g protein  Method: 0.8-1g protein per kg ABW: 122-153g protein    Current Nutrition Prescription     PO: NPO Diet NPO Type: Tube Feeding  Oral Nutrition Supplement:  Intake: N/A    EN: Peptamen Intense VHP  Goal Rate: 75ml  Water Flushes: 25ml  Modular: None  Route: NG   (keofeed located in gastric antrum per KUB 1-3-25  Tube: Small bore    At goal over: 20Hrs/day     Rx will supply:   Goal Volume 1500  mL/day     Flush Volume 500 mL/day     Energy 1500 Kcal/day 100 % Est Need   Protein 138 g/day 100 % Est Need   Fiber 6 g/day     Water in  EN 1260 mL     Total Water 1760 mL     Meet DRI Yes        --------------------------------------------------------------------------  Product/Rate verified at bedside: Yes  Infusing Rate at time of visit: off    Average Delivery from Chartin Day:   Volume 1188 mL/day 79  % Goal Vol.   Flush Volume 856 mL/day     Energy  Kcal/day  % Est Need   Protein  g/day  % Est Need   Fiber  g/day     Water in  EN  mL     Total Water  mL     Meet DRI No           Assessment & Plan   Nutrition Diagnosis   Date: 1-3-25  Updated: 25  Problem Inadequate energy intake  "   Etiology ARF/VENT   Signs/Symptoms 79% goal volume EN   Status: Active TF held    Goal:   Nutrition to support treatment and Adjust EN      Nutrition Intervention      Follow treatment progress, Care plan reviewed    Per KUB: feeding tube is in gastric antrum, rec advance further into small bowel if possible    Suggest restart TF at low rate @25ml/hr, gradually advance TF as tolerated to goal rate @75ml/hr, free water @25ml/hr    TF at goal volume will provide 1500ml, 1500kcal,100% kcal needs, 138g protein, 100% protein needs, 38meq K+, 1020mg Phos, 6g fiber, 1760ml free water    Monitoring/Evaluation:   Per protocol, I&O, Pertinent labs, Weight, Skin status, GI status, Symptoms, Hemodynamic stability    Maria Del Carmen Matamoros, LOUIE  Time Spent: 30min

## 2025-01-08 NOTE — PROGRESS NOTES
Intensive Care Follow-up     Hospital:  LOS: 6 days   Ms. Natalia Arzate, 39 y.o. female is followed for:   Acute respiratory failure with hypercapnia        Subjective     Ms. Arzate is a 38yo F with a history of HLD, Hypothyroidism, Afib, PE/DVT, Factor V Leiden mutation, and stroke who presented to [facility] with altered mental status. Labs there were significant for renal failure, hyperglycemia, and hypercapnic respiratory failure. Imaging consistent with basilar pulmonary infiltrates concerning for pneumonia. She was intubated and required the initiation of vasopressors. She was transferred to [facility] on 1/2/25 for ongoing care.      Since arrival, she has continued to require mechanical ventilation along with vasopressors. She has been started on Vancomycin, Flagyl and Cefepime. Cultures are significant for MRSA in the sputum along with gemella sanguinis in the blood.      Nephrology has been following for MARIAA with poor urine output and hyperkalemia.      On the morning of 1/4/25, she developed worsening hypoxia despite an FiO2 of 100% and PEEP of 16. Imaging showed white out of the left lung. Bronchoscopy was performed with mucopurulent secretions suctioned from the left upper and left lower lobes. Repeat CXR after bronchoscopy showed some improvement in left upper lobe aeration with persistent left lower lobe disease vs pleural effusion.   Interval History:  Chart has been reviewed.  Overnight the patient developed worsened acidosis with poor creatinine clearance.  He was started on a bicarbonate drip.  She has remained on norepinephrine drip through the night.  Currently requiring 50% FiO2 with a PEEP of 14 with difficulty weaning this while in the room.    The patient's past medical, surgical and social history were reviewed and updated in Epic as appropriate.        Objective     Infusions:  fentanyl 10 mcg/mL,  mcg/hr, Last Rate: 100 mcg/hr (01/08/25 0714)  heparin, 26 Units/kg/hr, Last Rate: 26  "Units/kg/hr (01/08/25 1331)  Midazolam 1 mg/mL 100mL NS, 1-10 mg/hr, Last Rate: 2 mg/hr (01/08/25 1300)  norepinephrine, 0.02-0.3 mcg/kg/min, Last Rate: 0.06 mcg/kg/min (01/08/25 0357)  Pharmacy to Dose Heparin,   Pharmacy to dose vancomycin,   sodium bicarbonate 8.4 % 150 mEq in dextrose (D5W) 5 % 1,000 mL infusion (greater than 100 mEq), 150 mEq, Last Rate: 150 mEq (01/08/25 0551)      Medications:  ampicillin-sulbactam, 3 g, Intravenous, Q12H  bumetanide, 2 mg, Intravenous, Q12H  chlorhexidine, 15 mL, Mouth/Throat, Q12H  famotidine, 20 mg, Intravenous, BID  insulin glargine, 35 Units, Subcutaneous, Q12H  insulin regular, 10 Units, Subcutaneous, Q6H  insulin regular, 2-9 Units, Subcutaneous, Q6H  nystatin, , Topical, Q12H  senna-docusate sodium, 2 tablet, Nasogastric, BID   And  polyethylene glycol, 17 g, Nasogastric, Daily  vancomycin, 1,750 mg, Intravenous, Q24H        Vital Sign Min/Max for last 24 hours  Temp  Min: 99.2 °F (37.3 °C)  Max: 102.3 °F (39.1 °C)   BP  Min: 102/40  Max: 143/53   Pulse  Min: 97  Max: 110   Resp  Min: 24  Max: 24   SpO2  Min: 88 %  Max: 99 %   No data recorded       Input/Output for last 24 hour shift  01/07 0701 - 01/08 0700  In: 4276.6 [I.V.:2032.6]  Out: 2077 [Urine:2077]   Mode: VC+/AC  FiO2 (%):  [50 %-60 %] 50 %  S RR:  [24] 24  S VT:  [380 mL] 380 mL  PEEP/CPAP (cm H2O):  [14 cm H20] 14 cm H20  MAP (cm H2O):  [19-21] 19  Objective:  General Appearance:  Uncomfortable, ill-appearing and in no acute distress (Sedated).    Vital signs: (most recent): Blood pressure 106/42, pulse 103, temperature (!) 101.7 °F (38.7 °C), temperature source Axillary, resp. rate 24, height 162.6 cm (64.02\"), weight (!) 188 kg (414 lb 12.8 oz), SpO2 91%, not currently breastfeeding.    HEENT: (Endotracheal tube in place.)    Lungs:  Normal effort.  There are decreased breath sounds.  No rales, wheezes or rhonchi.    Heart: Normal rate.  Regular rhythm.  S1 normal and S2 normal.  No murmur.   Chest: " Symmetric chest wall expansion.   Abdomen: Abdomen is soft and non-distended.  Bowel sounds are normal.   There is no abdominal tenderness.   There is no mass.   Extremities: There is dependent edema.    Neurological: (Sedated but arousable.  Following very little.).    Pupils:  Pupils are equal, round, and reactive to light.  Pupils are equal.   Skin:  Warm.                Results from last 7 days   Lab Units 01/08/25  0308 01/07/25 0433 01/06/25 1956   WBC 10*3/mm3 8.99 8.54 8.24   HEMOGLOBIN g/dL 7.2* 7.7* 7.5*  7.5*   PLATELETS 10*3/mm3 177 176 178     Results from last 7 days   Lab Units 01/08/25  0308 01/07/25 0433 01/06/25 0319 01/04/25 0434 01/03/25 0433   SODIUM mmol/L 141 145 142   < > 142  142  142   POTASSIUM mmol/L 5.1 4.2 4.5   < > 4.2  4.2  4.2   CO2 mmol/L 17.0* 19.0* 18.0*   < > 21.0*  24.0  24.0   BUN mg/dL 65* 55* 49*   < > 29*  28*  28*   CREATININE mg/dL 3.47* 3.27* 2.99*   < > 1.26*  1.29*  1.29*   MAGNESIUM mg/dL  --  1.9 1.8  --  2.0  2.0   PHOSPHORUS mg/dL 6.5*  --  5.2*  --  2.8  2.7   GLUCOSE mg/dL 214* 119* 118*   < > 142*  147*  147*    < > = values in this interval not displayed.     Estimated Creatinine Clearance: 37.5 mL/min (A) (by C-G formula based on SCr of 3.47 mg/dL (H)).    Results from last 7 days   Lab Units 01/08/25  0844   PH, ARTERIAL pH units 7.149*   PCO2, ARTERIAL mm Hg 50.3*   PO2 ART mm Hg 76.7*         I reviewed the patient's results and images.     Assessment & Plan   Impression        Acute respiratory failure with hypercapnia    MARIAA (acute kidney injury)    Type 2 diabetes mellitus with hyperglycemia    Sepsis       Plan        We will plan to place hemodialysis catheter for renal replacement therapy.  Continue with current antimicrobial therapy.  With current ventilator support and we will try to wean as possible.  I suspect if we were able to get some fluid off of her we may have some improvement in her respiratory status.  Nutritional  support as before.  Continue with glucose control as before.  Continue to monitor hemoglobin.  No transfusion for now.  Given her continued fevers, reculture now.  Is currently critically ill and at imminent risk of death.    Plan of care and goals reviewed with mulitdisciplinary/antibiotic stewardship team during rounds.   I discussed the patient's findings and my recommendations with nursing staff and consulting provider     High level of risk due to:  drug(s) requiring intensive monitoring for toxicity, parenteral controlled substances, and decision regarding escalation of level of hospital care.    Time spent Critical care 39 min (exclusive of procedure time)  including high complexity decision making to assess, manipulate, and support vital organ system failure in this individual who has impairment of one or more vital organ systems such that there is a high probability of imminent or life threatening deterioration in the patient’s condition.      Saji De Jesus MD, [facility]  Pulmonology and Critical Care Medicine

## 2025-01-08 NOTE — SIGNIFICANT NOTE
Results from last 7 days   Lab Units 01/08/25  0356   PH, ARTERIAL pH units 7.123*   PO2 ART mm Hg 75.6*   PCO2, ARTERIAL mm Hg 48.5*   HCO3 ART mmol/L 15.8*     Bicarb gtt initiated, will f/u ABG @ 0800

## 2025-01-08 NOTE — PROGRESS NOTES
LOS: 6 days   Patient Care Team:  Bladimir Calle PA-C as PCP - General (Physician Assistant)    Chief Complaint: MARIAA   Hyperkalemia    Subjective   Worsening renal function. Net fluid balance +2 liter in last 24hr. Worsening respiratory status. Mixed met and resp acidosis noted on ABG and BMP. Intake 4 liter in last 24hr. Remains on vent w high PEEP.     History taken from: family RN    Objective     Vital Sign Min/Max for last 24 hours  Temp  Min: 99.2 °F (37.3 °C)  Max: 102.3 °F (39.1 °C)   BP  Min: 102/40  Max: 141/60   Pulse  Min: 99  Max: 112   Resp  Min: 24  Max: 24   SpO2  Min: 88 %  Max: 99 %   No data recorded   Weight  Min: 188 kg (414 lb 12.8 oz)  Max: 188 kg (414 lb 12.8 oz)         I/O this shift:  In: 1727 [I.V.:1727]  Out: 2930 [Urine:380; Emesis/NG output:2550]  I/O last 3 completed shifts:  In: 6169.4 [I.V.:3008.4; Other:847; NG/GT:2014; IV Piggyback:300]  Out: 3452 [Urine:3452]    Objective    Gen: intubated and sedated.    HENT: NC, AT  NECK: Supple, ETT in place  LUNGS: Intubated on MV  CVS: S1/S2 audible, RRR, no murmur   Abd: Soft, NT, ND, BS+   Ext: +2 UE and LE edema, no cyanosis   CNS: Intubated and sedated.   Psy: Intubated and sedated.   Skin: Warm, dry and intact  : Urena cath +    Results Review:     I reviewed the patient's new clinical results.    WBC WBC   Date Value Ref Range Status   01/08/2025 8.99 3.40 - 10.80 10*3/mm3 Final   01/07/2025 8.54 3.40 - 10.80 10*3/mm3 Final   01/06/2025 8.24 3.40 - 10.80 10*3/mm3 Final   01/06/2025 9.06 3.40 - 10.80 10*3/mm3 Final      HGB Hemoglobin   Date Value Ref Range Status   01/08/2025 7.2 (L) 12.0 - 15.9 g/dL Final   01/07/2025 7.7 (L) 12.0 - 15.9 g/dL Final   01/06/2025 7.5 (L) 12.0 - 15.9 g/dL Final   01/06/2025 7.5 (L) 12.0 - 15.9 g/dL Final   01/06/2025 7.0 (L) 12.0 - 15.9 g/dL Final   01/06/2025 7.6 (L) 12.0 - 15.9 g/dL Final      HCT Hematocrit   Date Value Ref Range Status   01/08/2025 25.9 (L) 34.0 - 46.6 % Final   01/07/2025  "26.8 (L) 34.0 - 46.6 % Final   01/06/2025 25.4 (L) 34.0 - 46.6 % Final   01/06/2025 26.1 (L) 34.0 - 46.6 % Final   01/06/2025 23.8 (L) 34.0 - 46.6 % Final   01/06/2025 26.3 (L) 34.0 - 46.6 % Final      Platlets No results found for: \"LABPLAT\"   MCV MCV   Date Value Ref Range Status   01/08/2025 104.0 (H) 79.0 - 97.0 fL Final   01/07/2025 99.6 (H) 79.0 - 97.0 fL Final   01/06/2025 101.2 (H) 79.0 - 97.0 fL Final   01/06/2025 101.2 (H) 79.0 - 97.0 fL Final          Sodium Sodium   Date Value Ref Range Status   01/08/2025 142 136 - 145 mmol/L Final   01/08/2025 141 136 - 145 mmol/L Final   01/07/2025 145 136 - 145 mmol/L Final   01/06/2025 142 136 - 145 mmol/L Final      Potassium Potassium   Date Value Ref Range Status   01/08/2025 5.6 (H) 3.5 - 5.2 mmol/L Final   01/08/2025 5.1 3.5 - 5.2 mmol/L Final   01/07/2025 4.2 3.5 - 5.2 mmol/L Final   01/06/2025 4.5 3.5 - 5.2 mmol/L Final      Chloride Chloride   Date Value Ref Range Status   01/08/2025 104 98 - 107 mmol/L Final   01/08/2025 103 98 - 107 mmol/L Final   01/07/2025 108 (H) 98 - 107 mmol/L Final   01/06/2025 104 98 - 107 mmol/L Final      CO2 CO2   Date Value Ref Range Status   01/08/2025 19.0 (L) 22.0 - 29.0 mmol/L Final   01/08/2025 17.0 (L) 22.0 - 29.0 mmol/L Final   01/07/2025 19.0 (L) 22.0 - 29.0 mmol/L Final   01/06/2025 18.0 (L) 22.0 - 29.0 mmol/L Final      BUN BUN   Date Value Ref Range Status   01/08/2025 68 (H) 6 - 20 mg/dL Final   01/08/2025 65 (H) 6 - 20 mg/dL Final   01/07/2025 55 (H) 6 - 20 mg/dL Final   01/06/2025 49 (H) 6 - 20 mg/dL Final      Creatinine Creatinine   Date Value Ref Range Status   01/08/2025 3.57 (H) 0.57 - 1.00 mg/dL Final   01/08/2025 3.47 (H) 0.57 - 1.00 mg/dL Final   01/07/2025 3.27 (H) 0.57 - 1.00 mg/dL Final   01/06/2025 2.99 (H) 0.57 - 1.00 mg/dL Final      Calcium Calcium   Date Value Ref Range Status   01/08/2025 8.8 8.6 - 10.5 mg/dL Final   01/08/2025 8.6 8.6 - 10.5 mg/dL Final   01/07/2025 8.4 (L) 8.6 - 10.5 mg/dL Final " "  01/06/2025 8.2 (L) 8.6 - 10.5 mg/dL Final      PO4 No results found for: \"CAPO4\"   Albumin Albumin   Date Value Ref Range Status   01/08/2025 2.8 (L) 3.5 - 5.2 g/dL Final   01/08/2025 3.1 (L) 3.5 - 5.2 g/dL Final      Magnesium Magnesium   Date Value Ref Range Status   01/08/2025 1.9 1.6 - 2.6 mg/dL Final   01/07/2025 1.9 1.6 - 2.6 mg/dL Final   01/06/2025 1.8 1.6 - 2.6 mg/dL Final      Uric Acid No results found for: \"URICACID\"         Results from last 7 days   Lab Units 01/08/25  1631 01/08/25  0308 01/07/25  0433 01/06/25  1956 01/06/25  1222 01/06/25  0319 01/05/25  0533 01/04/25  1703 01/04/25  0434   SODIUM mmol/L 142 141 145  --   --  142 143  --  144   POTASSIUM mmol/L 5.6* 5.1 4.2  --   --  4.5 3.9  --  4.3   CHLORIDE mmol/L 104 103 108*  --   --  104 106  --  110*   CO2 mmol/L 19.0* 17.0* 19.0*  --   --  18.0* 22.0  --  23.0   BUN mg/dL 68* 65* 55*  --   --  49* 42*  --  37*   CREATININE mg/dL 3.57* 3.47* 3.27*  --   --  2.99* 2.22*  --  1.99*   CALCIUM mg/dL 8.8 8.6 8.4*  --   --  8.2* 8.0*  --  8.0*   ALBUMIN g/dL 2.8* 3.1*  --   --   --   --  3.2*  --  3.0*   WBC 10*3/mm3  --  8.99 8.54 8.24  --  9.06 9.82  --  8.93   HEMOGLOBIN g/dL  --  7.2* 7.7* 7.5*  7.5* 7.0* 7.6* 8.0*   < > 6.7*   PLATELETS 10*3/mm3  --  177 176 178  --  167 197  --  195    < > = values in this interval not displayed.       Intake/Output Summary (Last 24 hours) at 1/8/2025 1817  Last data filed at 1/8/2025 1800  Gross per 24 hour   Intake 3431.7 ml   Output 3257 ml   Net 174.7 ml         Medication Review: Yes    Assessment & Plan       Acute respiratory failure with hypercapnia    MARIAA (acute kidney injury)    Type 2 diabetes mellitus with hyperglycemia    Sepsis      Assessment & Plan:    1-MARIAA- non oliguric - Pre-renal azotemia vs sepsis related MARIAA. UA - RBC 3-5, + protein. 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 At home on lisinopril, metformin and topiramate. Initially was improving with IV hydration and " hemodynamic support but now worsening and UOP has decreased.   2- Hyperkalemia - Management with CRRT  3- Hyperglycemia- On admission. Improved.   4- Anemia- Transfuse at hB<7  5- Shock- On pressor support. So far no infectious source identified.   6- hx of Factor V Leiden deficiency   7- hx of DM   8- Respiratory distress - suspicion of aspiration pneumonia   9- Hx of Gout   10. Nutrition: On high protein feed discussed with dietician overall protein intake is still <1g/kg/day     Plan:  - Worsening renal function. Unable to maintain negative fluid balance despite good UOP. Given worsening volume status and respiratory status with increase vent requirement. Will initiate CRRT for optimization of volume status and correction of acidosis. Discussed with patient's mother on the ph. She is agreeable for renal replacement therapy  - D/C bicarb gtt once started on CRRT  - Continue with diuretics for now  - Monitor H/H and transfuse for Hgb less than 7.0   - Dose meds to eGFR<35ml/min  - Maintain MAP 65 or above.     Discussed with RN and Critical care team  Stanley Velasco MD  01/08/25  18:17 EST

## 2025-01-08 NOTE — CASE MANAGEMENT/SOCIAL WORK
Continued Stay Note  [facility]     Patient Name: Natalia Arzate  MRN: [id number]  Today's Date: 1/8/2025    Admit Date: 1/2/2025    Plan: ongoing   Discharge Plan       Row Name 01/08/25 1602       Plan    Plan ongoing    Patient/Family in Agreement with Plan other (see comments)    Plan Comments Discussed in MDR, patient remains intubated/sedated. Placing dialysis catheter today. Nephrology, Cardiology following. D/C TBD. CM will continue to follow.                   Discharge Codes    No documentation.                 Expected Discharge Date and Time       Expected Discharge Date Expected Discharge Time    Jan 17, 2025               Rj Valle RN

## 2025-01-08 NOTE — PLAN OF CARE
Goal Outcome Evaluation:         Unable to wean. No vent changes made.

## 2025-01-08 NOTE — PLAN OF CARE
Goal Outcome Evaluation:   Unable to wean ventilator settings.

## 2025-01-08 NOTE — PROGRESS NOTES
Pharmacy to Dose Heparin Infusion Note  Natalia Arzate is a 39 y.o. female receiving heparin infusion.     Therapy for (VTE/Cardiac): VTE  Patient Weight: 159 kg  Initial Bolus (Y/N): No  Any Bolus (Y/N): Yes    Signs or Symptoms of Bleeding: Heparin gtt previously held for dropping hemoglobin. Repeat hemoglobin 7.0 1/6. D/w MD Jerilyn garcia to restart heparin gtt.     VTE (PE/DVT)  Initial rate: 18 units/kg/hr (Max 1,500 units/hr)    Anti-Xa Bolus   Dose Infusion Hold   Time Infusion Rate Change (units/kg/hr) Repeat  Anti-Xa   < 0.11 50 units/kg  (4000 units Max) None Increase by  4 units/kg/hr 6 hours   0.11 - 0.19 25 units/kg  (2000 units Max) None Increase by  3 units/kg/hr 6 hours   0.2 - 0.29 0 None Increase by  2 units/kg/hr 6 hours   0.3 - 0.7 0 None No Change 6 hours (after 2 consecutive levels in range check qAM)   0.71 - 0.8 0 None Decrease by  1 units/kg/hr 6 hours   0.81 - 0.9 0 None Decrease by  2 units/kg/hr 6 hours   0.91 - 1 0 60 minutes Decrease by  3 units/kg/hr 6 hours   >1 0 Hold  After Anti-Xa less than 0.7 decrease previous rate by  4 units/kg/hr  Every 2 hours until Anti-Xa less than 0.7 then when infusion restarts in 6 hours     Results from last 7 days   Lab Units 01/08/25  0308 01/07/25  0433 01/06/25  1956 01/06/25  1319 01/03/25  0433 01/02/25  1538   INR   --   --   --  1.24*  --  1.15*   HEMOGLOBIN g/dL 7.2* 7.7* 7.5*  7.5*  --    < >  --    HEMATOCRIT % 25.9* 26.8* 26.1*  25.4*  --    < >  --    PLATELETS 10*3/mm3 177 176 178  --    < >  --     < > = values in this interval not displayed.       Date   Time   Anti-Xa Current Rate (units/kg/hr) Bolus   (units) Rate Change   (units/kg/hr) New Rate (units/kg/hr) Repeat  Anti-Xa Comments /  Pump Check    1/2 1500 pending -- -- +9 9 0000 D/w RN; will await labs, but expect this to be an aPTT assay    1/2 N/a N/a 9 -- -- 9 2100 Baseline anti-Xa 0.1, re-timed next check for 2100.    1/2 2021 0.1 9 4000 +4 13 0400 DW RN   1/3 0500 0.10 13 4000  +4 17 1200 D/w RN   1/3 1200 0.19 17 4000 +1 18 2000 D/w RN   1/3 2035 0.23 18 -- +2 20 0400 D/w RN   1/4 0545 0.23 18 -- +2 22 1200 D/w RN     1/4   0815   HOLD   22   HOLD   HOLD   HOLD   HOLD D/w ROB Kinsey & APRN; hold heparin drip for now 2/2 H&H dropping; no overt bleeding noted;   MAR placeholder entered.   1/6 1300 pending -- -- +9 9 2000 D/w Dr. De Jesus and Tio, RN. Restart heparin gtt. Will not give initial bolus given concern for dropping hgb. Ok for future boluses.     1/6 1956 0.10 9 4000 +4 13 0600 DW RN. Continue watching hgb, currently stable   1/7 0600 0.10 13 4000 +4 17 1200 D/w RN   1/7 1204 0.10 17 4000 +4 21 2000 D/w ROB Star    1/7 2023 0.17 21 2000 +3 24 0600 D/w ROB Burns   1/8 0650 0.23 24 -- +1 26 1200 D/w RN   1/8 1250 0.31 26 -- -- 26 2000 D/w RN                                                                     Thank you,    Monalisa Saldaña, PharmD  1/8/2025  13:38 EST

## 2025-01-08 NOTE — PROGRESS NOTES
"Pharmacy Consult - Vancomycin Dosing and Monitoring (Renal Dysfunction / Dialysis)    Natalia Arzate is a 39 y.o. female receiving vancomycin therapy.     Indication:  MRSA PNA  Consulting Provider:   Intensivist  ID Consult:  No    Goal Trough (Pulse Dosing):  15-20 mcg/mL    Current Antimicrobial Therapy  Anti-Infectives (From admission, onward)      Ordered     Dose/Rate Route Frequency Start Stop    01/07/25 1306  vancomycin (dosing per levels)        Ordering Provider: Monalisa Simms, PharmD     Not Applicable Daily 01/07/25 1400 01/11/25 0859    01/06/25 1143  ampicillin-sulbactam (UNASYN) 3 g in sodium chloride 0.9 % 100 mL MBP        Ordering Provider: Saji De Jesus MD    3 g  over 30 Minutes Intravenous Every 12 Hours 01/06/25 1500 01/13/25 1459    01/05/25 0738  ampicillin 2000 mg IVPB in 100 mL NS (MBP)  Status:  Discontinued        Ordering Provider: Tiffanie You V., DO    2 g  200 mL/hr over 30 Minutes Intravenous Every 6 Hours 01/05/25 0900 01/06/25 1143    01/05/25 0800  Vancomycin HCl 1,250 mg in sodium chloride 0.9 % 250 mL VTB        Ordering Provider: Rocky Brewster RPH    1,250 mg  200 mL/hr over 75 Minutes Intravenous Once 01/05/25 0900 01/05/25 1105    01/04/25 1511  ampicillin 2000 mg IVPB in 100 mL NS (MBP)  Status:  Discontinued        Ordering Provider: Bettie Walter APRN    2 g  200 mL/hr over 30 Minutes Intravenous Every 4 Hours 01/04/25 2100 01/05/25 0738    01/04/25 1500  ampicillin 1780 mg/100 mL (89% dose) IVPB for IV graded dose challenge        Ordering Provider: Bettie Walter APRN   Placed in \"Followed by\" Linked Group    1,780 mg  400 mL/hr over 15 Minutes Intravenous Once 01/04/25 1800 01/04/25 1818    01/04/25 1500  ampicillin 200 mg/100 mL (10% dose) IVPB for IV graded dose challenge        Ordering Provider: Bettie Walter APRN   Placed in \"Followed by\" Linked Group    200 mg  400 mL/hr over 15 Minutes Intravenous Once 01/04/25 1700 01/04/25 1712    " "01/04/25 1500  ampicillin 20 mg/100 mL (1% dose) IVPB for IV graded dose challenge        Ordering Provider: Bettie Walter APRN   Placed in \"Followed by\" Linked Group    20 mg  400 mL/hr over 15 Minutes Intravenous Once 01/04/25 1600 01/04/25 1617    01/04/25 0833  vancomycin (dosing per levels)  Status:  Discontinued        Ordering Provider: Rocky Brewster RPH     Not Applicable Daily 01/04/25 0930 01/07/25 1306    01/03/25 1114  cefepime 2000 mg IVPB in 100 mL NS (MBP)  Status:  Discontinued        Ordering Provider: David Lazaro PharmD    2,000 mg  over 4 Hours Intravenous Every 8 Hours 01/03/25 1600 01/04/25 1500    01/02/25 1548  vancomycin (dosing per levels)  Status:  Discontinued        Ordering Provider: David Lazaro PharmD     Not Applicable Daily 01/03/25 0900 01/03/25 0738    01/03/25 0738  Vancomycin HCl 1,250 mg in sodium chloride 0.9 % 250 mL VTB  Status:  Discontinued        Ordering Provider: David Lazaro PharmD    1,250 mg  200 mL/hr over 75 Minutes Intravenous Every 12 Hours 01/03/25 0900 01/04/25 0833    01/02/25 1231  cefepime 2000 mg IVPB in 100 mL NS (MBP)  Status:  Discontinued        Ordering Provider: Bettie Walter APRN    2,000 mg  over 4 Hours Intravenous Every 12 Hours 01/02/25 2100 01/03/25 1114    01/02/25 1952  metroNIDAZOLE (FLAGYL) IVPB 500 mg  Status:  Discontinued        Ordering Provider: Roly Garcia MD    500 mg  200 mL/hr over 30 Minutes Intravenous Every 8 Hours 01/02/25 2100 01/06/25 1143    01/02/25 1547  vancomycin (VANCOCIN) 1,000 mg in sodium chloride 0.9 % 250 mL IVPB-VTB        Ordering Provider: David Lazaro PharmD    1,000 mg  250 mL/hr over 60 Minutes Intravenous Once 01/02/25 1645 01/02/25 1811    01/02/25 1511  Pharmacy to dose vancomycin        Ordering Provider: David Lazaro PharmD     Not Applicable Continuous PRN 01/02/25 1509 01/09/25 1508    01/02/25 1235  cefepime 2000 mg IVPB in 100 mL NS (MBP)        Ordering " "Provider: Bettie Walter APRN    2,000 mg  over 30 Minutes Intravenous Once 01/02/25 1330 01/02/25 1317    01/02/25 1228  cefTRIAXone (ROCEPHIN) 2,000 mg in sodium chloride 0.9 % 100 mL MBP  Status:  Discontinued        Ordering Provider: Bettie Walter APRN    2,000 mg  200 mL/hr over 30 Minutes Intravenous Every 24 Hours 01/02/25 1315 01/02/25 1230          Allergies  Allergies as of 01/02/2025 - Reviewed 01/02/2025   Allergen Reaction Noted    Salvia officinalis Itching, Other (See Comments), and Shortness Of Breath 03/02/2023    Shellfish allergy Anaphylaxis 12/30/2018    Toradol [ketorolac tromethamine] Anaphylaxis and Hives 06/27/2016    Tree nut Anaphylaxis and Shortness Of Breath 03/16/2022    Haldol [haloperidol] Hives and Mental Status Change 03/04/2020    Tramadol Hives and Swelling 12/08/2018    Amoxicillin Hives and Rash 06/27/2016    Penicillins Hives and Rash 06/27/2016     Labs  Results from last 7 days   Lab Units 01/08/25  0308 01/07/25  0433 01/06/25  0319   BUN mg/dL 65* 55* 49*   CREATININE mg/dL 3.47* 3.27* 2.99*     Results from last 7 days   Lab Units 01/08/25  0308 01/07/25  0433 01/06/25  1956   WBC 10*3/mm3 8.99 8.54 8.24     Evaluation of Dosing     Last Dose Received in the ED/Outside Facility: 2500mg IV x 1 given 1/2 at ~2100  Is Patient on Dialysis or Renal Replacement: No; potential RRT    Height - 162.6 cm (64.02\")  Weight - (!) 188 kg (414 lb 12.8 oz)    Estimated Creatinine Clearance: 37.5 mL/min (A) (by C-G formula based on SCr of 3.47 mg/dL (H)).    Intake & Output (last 2 days)         01/06 0701 01/07 0700 01/07 0701 01/08 0700 01/08 0701 01/09 0700    I.V. (mL/kg) 2134.7 (13.4) 2032.6 (10.8) 398 (2.1)    Other 731 541     NG/ 1503     IV Piggyback 100 200     Total Intake(mL/kg) 3887.7 (24.5) 4276.6 (22.7) 398 (2.1)    Urine (mL/kg/hr) 1495 (0.4) 2077 (0.5) 175 (0.2)    Emesis/NG output       Total Output 1495 2077 175    Net +2392.7 +2199.6 +223             "     Microbiology  Microbiology Results (last 10 days)       Procedure Component Value - Date/Time    Blood Culture - Blood, Arm, Right [[id number]]  (Normal) Collected: 01/05/25 1308    Lab Status: Preliminary result Specimen: Blood from Arm, Right Updated: 01/07/25 1330     Blood Culture No growth at 2 days    Narrative:      Less than seven (7) mL's of blood was collected.  Insufficient quantity may yield false negative results.    Blood Culture - Blood, Arm, Left [[id number]]  (Abnormal) Collected: 01/05/25 1305    Lab Status: Final result Specimen: Blood from Arm, Left Updated: 01/07/25 1100     Blood Culture Staphylococcus, coagulase negative     Isolated from Anaerobic Bottle     Gram Stain Anaerobic Bottle Gram positive cocci in clusters    Narrative:      Less than seven (7) mL's of blood was collected.  Insufficient quantity may yield false negative results.  Probable contaminant requires clinical correlation, susceptibility not performed unless requested by physician.    Blood Culture ID, PCR - Blood, Arm, Left [[id number]]  (Abnormal) Collected: 01/05/25 1305    Lab Status: Final result Specimen: Blood from Arm, Left Updated: 01/06/25 1435     BCID, PCR Staph spp, not aureus or lugdunensis. Identification by BCID2 PCR.     BOTTLE TYPE Anaerobic Bottle    Respiratory Culture - Bronchial Wash, Lung, Left Lower Lobe [[id number]]  (Abnormal)  (Susceptibility) Collected: 01/04/25 0755    Lab Status: Final result Specimen: Bronchial Wash from Lung, Left Lower Lobe Updated: 01/06/25 0908     Respiratory Culture Scant growth (1+) Staphylococcus aureus, MRSA     Comment:   Methicillin resistant Staphylococcus aureus, Patient may be an isolation risk.         No Normal Respiratory Margo     Gram Stain Moderate (3+) WBCs seen      Rare (1+) Gram positive cocci in pairs and clusters    Susceptibility        Staphylococcus aureus, MRSA      KELSIE      Clindamycin 0.25 ug/ml Resistant      Linezolid 2 ug/ml Susceptible       Oxacillin >=4 ug/ml Resistant      Tetracycline <=1 ug/ml Susceptible      Trimethoprim + Sulfamethoxazole <=10 ug/ml Susceptible      Vancomycin 1 ug/ml Susceptible                       Susceptibility Comments       Staphylococcus aureus, MRSA    This isolate is presumed to be clindamycin resistant based on detection of inducible clindamycin resistance.  Clindamycin may still be effective in some patients.  This isolate is presumed to be clindamycin resistant based on detection of inducible clindamycin resistance.  Clindamycin may still be effective in some patients.               Eosinophil Smear - Urine, Indwelling Urethral Catheter [[id number]]  (Normal) Collected: 01/02/25 1542    Lab Status: Final result Specimen: Urine from Indwelling Urethral Catheter Updated: 01/02/25 1700     Eosinophil Smear 0 % EOS/100 Cells     Narrative:      No eosinophil seen    MRSA Screen, PCR (Inpatient) - Swab, Nares [[id number]]  (Abnormal) Collected: 01/02/25 1246    Lab Status: Final result Specimen: Swab from Nares Updated: 01/02/25 1504     MRSA PCR Positive    Narrative:      The negative predictive value of this diagnostic test is high and should only be used to consider de-escalating anti-MRSA therapy. A positive result may indicate colonization with MRSA and must be correlated clinically.    Urine Culture - Urine, Indwelling Urethral Catheter [[id number]]  (Normal) Collected: 01/02/25 1213    Lab Status: Final result Specimen: Urine from Indwelling Urethral Catheter Updated: 01/04/25 1251     Urine Culture No growth    S. Pneumo Ag Urine or CSF - Urine, Urine, Clean Catch [[id number]]  (Normal) Collected: 01/02/25 1213    Lab Status: Final result Specimen: Urine, Clean Catch Updated: 01/02/25 1913     Strep Pneumo Ag Negative    Legionella Antigen, Urine - Urine, Urine, Clean Catch [[id number]]  (Normal) Collected: 01/02/25 1213    Lab Status: Final result Specimen: Urine, Clean Catch Updated: 01/02/25 1913     LEGIONELLA  ANTIGEN, URINE Negative    Respiratory Culture - Sputum, ET Suction [[id number]]  (Abnormal)  (Susceptibility) Collected: 01/02/25 1212    Lab Status: Final result Specimen: Sputum from ET Suction Updated: 01/04/25 0939     Respiratory Culture Moderate growth (3+) Staphylococcus aureus, MRSA      No Normal Respiratory Margo     Gram Stain Many (4+) WBCs per low power field      Rare (1+) Epithelial cells per low power field      Many (4+) Gram positive cocci in pairs and clusters    Susceptibility        Staphylococcus aureus, MRSA      KELSIE      Clindamycin 0.25 ug/ml Resistant      Linezolid 2 ug/ml Susceptible      Oxacillin >=4 ug/ml Resistant      Tetracycline <=1 ug/ml Susceptible      Trimethoprim + Sulfamethoxazole <=10 ug/ml Susceptible      Vancomycin <=0.5 ug/ml Susceptible                       Susceptibility Comments       Staphylococcus aureus, MRSA    This isolate is presumed to be clindamycin resistant based on detection of inducible clindamycin resistance.  Clindamycin may still be effective in some patients.               Blood Culture - Blood, Wrist, Right [[id number]]  (Abnormal) Collected: 01/01/25 2039    Lab Status: Final result Specimen: Blood from Wrist, Right Updated: 01/04/25 0707     Blood Culture Staphylococcus, coagulase negative     Isolated from Aerobic Bottle     Gram Stain Gram positive cocci in clusters    Narrative:      Probable contaminant requires clinical correlation, susceptibility not performed unless requested by physician.      Blood Culture - Blood, Wrist, Left [[id number]]  (Abnormal) Collected: 01/01/25 2039    Lab Status: Final result Specimen: Blood from Wrist, Left Updated: 01/04/25 0702     Blood Culture Gemella sanguinis     Comment: Currently there are no standardized antimicrobial testing guidelines for Gemella species. Literature search has this organism sensitive to penicillin and ampicillin.          Isolated from Anaerobic Bottle     Gram Stain Anaerobic Bottle  Gram positive cocci in clusters    Blood Culture ID, PCR - Blood, Wrist, Left [[id number]] Collected: 01/01/25 2039    Lab Status: Final result Specimen: Blood from Wrist, Left Updated: 01/02/25 1604     BCID, PCR Negative by BCID PCR. Culture to Follow.     BOTTLE TYPE Anaerobic Bottle    Respiratory Panel PCR w/COVID-19(SARS-CoV-2) [facility] In-House, NP Swab in UTM/VTM, 2 HR TAT - Swab, Nasopharynx [[id number]]  (Normal) Collected: 01/01/25 2023    Lab Status: Final result Specimen: Swab from Nasopharynx Updated: 01/01/25 2201     ADENOVIRUS, PCR Not Detected     Coronavirus 229E Not Detected     Coronavirus HKU1 Not Detected     Coronavirus NL63 Not Detected     Coronavirus OC43 Not Detected     COVID19 Not Detected     Human Metapneumovirus Not Detected     Human Rhinovirus/Enterovirus Not Detected     Influenza A PCR Not Detected     Influenza B PCR Not Detected     Parainfluenza Virus 1 Not Detected     Parainfluenza Virus 2 Not Detected     Parainfluenza Virus 3 Not Detected     Parainfluenza Virus 4 Not Detected     RSV, PCR Not Detected     Bordetella pertussis pcr Not Detected     Bordetella parapertussis PCR Not Detected     Chlamydophila pneumoniae PCR Not Detected     Mycoplasma pneumo by PCR Not Detected    Narrative:      In the setting of a positive respiratory panel with a viral infection PLUS a negative procalcitonin without other underlying concern for bacterial infection, consider observing off antibiotics or discontinuation of antibiotics and continue supportive care. If the respiratory panel is positive for atypical bacterial infection (Bordetella pertussis, Chlamydophila pneumoniae, or Mycoplasma pneumoniae), consider antibiotic de-escalation to target atypical bacterial infection.          Vancomycin Levels  Results from last 7 days   Lab Units 01/08/25  0308 01/07/25  0433 01/06/25  0319 01/05/25  0533 01/04/25  0946 01/03/25  0433 01/02/25  1314   VANCOMYCIN RM mcg/mL 17.00  21.70 27.70 18.90 27.90 13.40 13.90                 Assessment/Plan:    PTD vancomycin 2/2 MRSA PNA.   Of note, patient with MARIAA, solitary kidney, and new initiation of CRRT.   Last dose of Vancomycin was 1250 mg (~7.9 mg/kg) IV x 1 on 1/5/25 AM.  Vancomycin random level w/ AM labs on 1/8 was 17.0 mcg/mL. Per nephrology, starting CRRT today (1/8).  Will begin vancomycin 1750 mg IV Q24H to begin tonight at 1800 after CRRT begins. Will assess level 1/10 @1500 prior to the 3rd dose.   Reassess nephrology plan daily and adjust to pulse dosing if CRRT stops.   Patient tolerated ampicillin graded-dose challenge. Per discussion in rounds, consolidated antimicrobial regimen to vancomycin/Unasyn.   Pharmacy will continue to follow & adjust dose based on renal function, drug levels, & clinical status.    Thank you,  Monalisa Simms, PharmD  1/8/2025

## 2025-01-08 NOTE — PLAN OF CARE
Goal Outcome Evaluation:         Unable to decrease ventilator support.                                    Attending Attestation (For Attendings USE Only)...

## 2025-01-08 NOTE — PROCEDURES
"Non-Tunneled Catheter    Date/Time: 1/8/2025 6:38 PM    Performed by: Bettie Walter APRN  Authorized by: Bettie Walter APRN  Consent: Written consent obtained.  Consent given by: power of   Patient identity confirmed: arm band and provided demographic data  Time out: Immediately prior to procedure a \"time out\" was called to verify the correct patient, procedure, equipment, support staff and site/side marked as required.  Indications: vascular access  Anesthesia: local infiltration    Anesthesia:  Local Anesthetic: lidocaine 1% without epinephrine  Anesthetic total: 5 mL    Sedation:  Patient sedated: yes  Sedatives: see MAR for details  Analgesia: see MAR for details    Preparation: skin prepped with 2% chlorhexidine  Skin prep agent dried: skin prep agent completely dried prior to procedure  Sterile barriers: all five maximum sterile barriers used - cap, mask, sterile gown, sterile gloves, and large sterile sheet  Hand hygiene: hand hygiene performed prior to central venous catheter insertion  Location details: right internal jugular  Patient position: flat  Catheter type: double lumen  Catheter size: 12 Fr  Pre-procedure: landmarks identified  Ultrasound guidance: yes  Number of attempts: 1  Successful placement: yes  Post-procedure: line sutured and dressing applied  Assessment: blood return through all ports, free fluid flow, placement verified by x-ray and no pneumothorax on x-ray  Patient tolerance: patient tolerated the procedure well with no immediate complications  Comments: Dark, nonpulsatile blood was aspirated from RIJ using finder needle under ultrasound.  Wire was threaded via finder needle without difficulty and visualized in RIJ under ultrasound.  Dilator was advanced over wire after small incision was made in the skin.  Dilator was removed.  Second dilator was advanced over wire. Dilator was removed. Catheter was advanced over the wire up to 16 cm.  Wire was removed.  Flushed end " caps were placed on all three ports.  All three ports aspirated dark, nonpulsatile blood and flushed back well.  Catheter was sutured in place x 2.  An antibacterial dressing was applied over the site.      MAURY Cali, APRN, [facility]  Pulmonary and Critical Care Medicine  Electronically signed by YEIMI Ballard, 01/08/25, 6:39 PM EST.

## 2025-01-09 ENCOUNTER — APPOINTMENT (OUTPATIENT)
Dept: GENERAL RADIOLOGY | Facility: HOSPITAL | Age: 40
DRG: 870 | End: 2025-01-09
Payer: COMMERCIAL

## 2025-01-09 LAB
ALBUMIN SERPL-MCNC: 2.9 G/DL (ref 3.5–5.2)
ALBUMIN SERPL-MCNC: 3 G/DL (ref 3.5–5.2)
ALBUMIN SERPL-MCNC: 3.1 G/DL (ref 3.5–5.2)
ANION GAP SERPL CALCULATED.3IONS-SCNC: 18 MMOL/L (ref 5–15)
ANION GAP SERPL CALCULATED.3IONS-SCNC: 19 MMOL/L (ref 5–15)
ANION GAP SERPL CALCULATED.3IONS-SCNC: 19 MMOL/L (ref 5–15)
ARTERIAL PATENCY WRIST A: ABNORMAL
ATMOSPHERIC PRESS: ABNORMAL MM[HG]
BASE EXCESS BLDA CALC-SCNC: -7.9 MMOL/L (ref 0–2)
BASOPHILS # BLD AUTO: 0.03 10*3/MM3 (ref 0–0.2)
BASOPHILS NFR BLD AUTO: 0.5 % (ref 0–1.5)
BDY SITE: ABNORMAL
BODY TEMPERATURE: 37
BUN SERPL-MCNC: 27 MG/DL (ref 6–20)
BUN SERPL-MCNC: 34 MG/DL (ref 6–20)
BUN SERPL-MCNC: 48 MG/DL (ref 6–20)
BUN/CREAT SERPL: 20.8 (ref 7–25)
BUN/CREAT SERPL: 21.9 (ref 7–25)
BUN/CREAT SERPL: 24.5 (ref 7–25)
CA-I SERPL ISE-MCNC: 1.11 MMOL/L (ref 1.15–1.3)
CA-I SERPL ISE-MCNC: 1.12 MMOL/L (ref 1.15–1.3)
CA-I SERPL ISE-MCNC: 1.13 MMOL/L (ref 1.15–1.3)
CALCIUM SPEC-SCNC: 8.7 MG/DL (ref 8.6–10.5)
CHLORIDE SERPL-SCNC: 100 MMOL/L (ref 98–107)
CHLORIDE SERPL-SCNC: 101 MMOL/L (ref 98–107)
CHLORIDE SERPL-SCNC: 99 MMOL/L (ref 98–107)
CO2 BLDA-SCNC: 20.7 MMOL/L (ref 22–33)
CO2 SERPL-SCNC: 18 MMOL/L (ref 22–29)
CO2 SERPL-SCNC: 19 MMOL/L (ref 22–29)
CO2 SERPL-SCNC: 20 MMOL/L (ref 22–29)
COHGB MFR BLD: 1.9 % (ref 0–2)
CREAT SERPL-MCNC: 1.1 MG/DL (ref 0.57–1)
CREAT SERPL-MCNC: 1.55 MG/DL (ref 0.57–1)
CREAT SERPL-MCNC: 2.31 MG/DL (ref 0.57–1)
DEPRECATED RDW RBC AUTO: 63.6 FL (ref 37–54)
EGFRCR SERPLBLD CKD-EPI 2021: 27.1 ML/MIN/1.73
EGFRCR SERPLBLD CKD-EPI 2021: 43.8 ML/MIN/1.73
EGFRCR SERPLBLD CKD-EPI 2021: 66.1 ML/MIN/1.73
EOSINOPHIL # BLD AUTO: 0.1 10*3/MM3 (ref 0–0.4)
EOSINOPHIL NFR BLD AUTO: 1.8 % (ref 0.3–6.2)
EPAP: 0
ERYTHROCYTE [DISTWIDTH] IN BLOOD BY AUTOMATED COUNT: 17.4 % (ref 12.3–15.4)
GLUCOSE BLDC GLUCOMTR-MCNC: 129 MG/DL (ref 70–130)
GLUCOSE BLDC GLUCOMTR-MCNC: 179 MG/DL (ref 70–130)
GLUCOSE BLDC GLUCOMTR-MCNC: 82 MG/DL (ref 70–130)
GLUCOSE BLDC GLUCOMTR-MCNC: 90 MG/DL (ref 70–130)
GLUCOSE BLDC GLUCOMTR-MCNC: 96 MG/DL (ref 70–130)
GLUCOSE BLDC GLUCOMTR-MCNC: 97 MG/DL (ref 70–130)
GLUCOSE SERPL-MCNC: 160 MG/DL (ref 65–99)
GLUCOSE SERPL-MCNC: 193 MG/DL (ref 65–99)
GLUCOSE SERPL-MCNC: 80 MG/DL (ref 65–99)
HCO3 BLDA-SCNC: 19.3 MMOL/L (ref 20–26)
HCT VFR BLD AUTO: 24.9 % (ref 34–46.6)
HCT VFR BLD CALC: 21.8 % (ref 38–51)
HGB BLD-MCNC: 7.2 G/DL (ref 12–15.9)
HGB BLDA-MCNC: 7.1 G/DL (ref 14–18)
IMM GRANULOCYTES # BLD AUTO: 0.2 10*3/MM3 (ref 0–0.05)
IMM GRANULOCYTES NFR BLD AUTO: 3.7 % (ref 0–0.5)
INHALED O2 CONCENTRATION: 50 %
IPAP: 0
LYMPHOCYTES # BLD AUTO: 0.82 10*3/MM3 (ref 0.7–3.1)
LYMPHOCYTES NFR BLD AUTO: 15 % (ref 19.6–45.3)
MAGNESIUM SERPL-MCNC: 2.2 MG/DL (ref 1.6–2.6)
MAGNESIUM SERPL-MCNC: 2.3 MG/DL (ref 1.6–2.6)
MAGNESIUM SERPL-MCNC: 2.3 MG/DL (ref 1.6–2.6)
MCH RBC QN AUTO: 28.8 PG (ref 26.6–33)
MCHC RBC AUTO-ENTMCNC: 28.9 G/DL (ref 31.5–35.7)
MCV RBC AUTO: 99.6 FL (ref 79–97)
METHGB BLD QL: 0.4 % (ref 0–1.5)
MODALITY: ABNORMAL
MONOCYTES # BLD AUTO: 0.64 10*3/MM3 (ref 0.1–0.9)
MONOCYTES NFR BLD AUTO: 11.7 % (ref 5–12)
NEUTROPHILS NFR BLD AUTO: 3.67 10*3/MM3 (ref 1.7–7)
NEUTROPHILS NFR BLD AUTO: 67.3 % (ref 42.7–76)
NRBC BLD AUTO-RTO: 0 /100 WBC (ref 0–0.2)
OXYHGB MFR BLDV: 96.4 % (ref 94–99)
PAW @ PEAK INSP FLOW SETTING VENT: 0 CMH2O
PCO2 BLDA: 47 MM HG (ref 35–45)
PCO2 TEMP ADJ BLD: 47 MM HG (ref 35–45)
PEEP RESPIRATORY: 14 CM[H2O]
PH BLDA: 7.22 PH UNITS (ref 7.35–7.45)
PH, TEMP CORRECTED: 7.22 PH UNITS
PHOSPHATE SERPL-MCNC: 5.1 MG/DL (ref 2.5–4.5)
PHOSPHATE SERPL-MCNC: 5.3 MG/DL (ref 2.5–4.5)
PHOSPHATE SERPL-MCNC: 5.7 MG/DL (ref 2.5–4.5)
PLATELET # BLD AUTO: 136 10*3/MM3 (ref 140–450)
PMV BLD AUTO: 9.5 FL (ref 6–12)
PO2 BLDA: 110 MM HG (ref 83–108)
PO2 TEMP ADJ BLD: 110 MM HG (ref 83–108)
POTASSIUM SERPL-SCNC: 4.3 MMOL/L (ref 3.5–5.2)
POTASSIUM SERPL-SCNC: 4.6 MMOL/L (ref 3.5–5.2)
POTASSIUM SERPL-SCNC: 4.7 MMOL/L (ref 3.5–5.2)
RBC # BLD AUTO: 2.5 10*6/MM3 (ref 3.77–5.28)
SODIUM SERPL-SCNC: 137 MMOL/L (ref 136–145)
SODIUM SERPL-SCNC: 137 MMOL/L (ref 136–145)
SODIUM SERPL-SCNC: 139 MMOL/L (ref 136–145)
TOTAL RATE: 24 BREATHS/MINUTE
UFH PPP CHRO-ACNC: 0.59 IU/ML (ref 0.3–0.7)
UFH PPP CHRO-ACNC: 0.71 IU/ML (ref 0.3–0.7)
UFH PPP CHRO-ACNC: 0.87 IU/ML (ref 0.3–0.7)
VENTILATOR MODE: ABNORMAL
VT ON VENT VENT: 0.38 ML
WBC NRBC COR # BLD AUTO: 5.46 10*3/MM3 (ref 3.4–10.8)

## 2025-01-09 PROCEDURE — 83050 HGB METHEMOGLOBIN QUAN: CPT

## 2025-01-09 PROCEDURE — 82330 ASSAY OF CALCIUM: CPT | Performed by: INTERNAL MEDICINE

## 2025-01-09 PROCEDURE — 83735 ASSAY OF MAGNESIUM: CPT | Performed by: INTERNAL MEDICINE

## 2025-01-09 PROCEDURE — 63710000001 INSULIN REGULAR HUMAN PER 5 UNITS: Performed by: INTERNAL MEDICINE

## 2025-01-09 PROCEDURE — 82948 REAGENT STRIP/BLOOD GLUCOSE: CPT

## 2025-01-09 PROCEDURE — 74018 RADEX ABDOMEN 1 VIEW: CPT

## 2025-01-09 PROCEDURE — 82375 ASSAY CARBOXYHB QUANT: CPT

## 2025-01-09 PROCEDURE — 25810000003 SODIUM CHLORIDE 0.9 % SOLUTION: Performed by: NURSE PRACTITIONER

## 2025-01-09 PROCEDURE — 25810000003 SODIUM CHLORIDE 0.9 % SOLUTION: Performed by: INTERNAL MEDICINE

## 2025-01-09 PROCEDURE — 94799 UNLISTED PULMONARY SVC/PX: CPT

## 2025-01-09 PROCEDURE — 85520 HEPARIN ASSAY: CPT

## 2025-01-09 PROCEDURE — 25010000002 AMPICILLIN-SULBACTAM PER 1.5 G: Performed by: INTERNAL MEDICINE

## 2025-01-09 PROCEDURE — 82805 BLOOD GASES W/O2 SATURATION: CPT

## 2025-01-09 PROCEDURE — 94003 VENT MGMT INPAT SUBQ DAY: CPT

## 2025-01-09 PROCEDURE — 80069 RENAL FUNCTION PANEL: CPT | Performed by: INTERNAL MEDICINE

## 2025-01-09 PROCEDURE — 25010000002 FENTANYL CITRATE (PF) 2500 MCG/50ML SOLUTION: Performed by: NURSE PRACTITIONER

## 2025-01-09 PROCEDURE — 25010000002 BUMETANIDE PER 0.5 MG: Performed by: INTERNAL MEDICINE

## 2025-01-09 PROCEDURE — 25010000002 PHENYLEPHRINE 10 MG/ML SOLUTION 5 ML VIAL

## 2025-01-09 PROCEDURE — 25010000002 METHYLNALTREXONE 12 MG/0.6ML SOLUTION: Performed by: INTERNAL MEDICINE

## 2025-01-09 PROCEDURE — 85025 COMPLETE CBC W/AUTO DIFF WBC: CPT

## 2025-01-09 PROCEDURE — 25810000003 SODIUM CHLORIDE 0.9 % SOLUTION 250 ML FLEX CONT

## 2025-01-09 PROCEDURE — 99291 CRITICAL CARE FIRST HOUR: CPT | Performed by: INTERNAL MEDICINE

## 2025-01-09 PROCEDURE — 71045 X-RAY EXAM CHEST 1 VIEW: CPT

## 2025-01-09 PROCEDURE — 63710000001 INSULIN GLARGINE PER 5 UNITS: Performed by: INTERNAL MEDICINE

## 2025-01-09 PROCEDURE — 25010000002 VANCOMYCIN 1.75-0.9 GM/500ML-% SOLUTION

## 2025-01-09 PROCEDURE — 25010000002 FENTANYL CITRATE (PF) 2500 MCG/50ML SOLUTION: Performed by: INTERNAL MEDICINE

## 2025-01-09 PROCEDURE — 25010000002 HEPARIN (PORCINE) 25000-0.45 UT/250ML-% SOLUTION

## 2025-01-09 RX ADMIN — SENNOSIDES AND DOCUSATE SODIUM 2 TABLET: 50; 8.6 TABLET ORAL at 20:10

## 2025-01-09 RX ADMIN — CALCIUM CHLORIDE, MAGNESIUM CHLORIDE, SODIUM CHLORIDE, SODIUM BICARBONATE, POTASSIUM CHLORIDE AND SODIUM PHOSPHATE DIBASIC DIHYDRATE 1500 ML/HR: 3.68; 3.05; 6.34; 3.09; .314; .187 INJECTION INTRAVENOUS at 13:57

## 2025-01-09 RX ADMIN — AMPICILLIN SODIUM, SULBACTAM SODIUM 3 G: 2; 1 INJECTION, POWDER, FOR SOLUTION INTRAMUSCULAR; INTRAVENOUS at 14:51

## 2025-01-09 RX ADMIN — CALCIUM CHLORIDE, MAGNESIUM CHLORIDE, SODIUM CHLORIDE, SODIUM BICARBONATE, POTASSIUM CHLORIDE AND SODIUM PHOSPHATE DIBASIC DIHYDRATE 1500 ML/HR: 3.68; 3.05; 6.34; 3.09; .314; .187 INJECTION INTRAVENOUS at 17:31

## 2025-01-09 RX ADMIN — CALCIUM CHLORIDE, MAGNESIUM CHLORIDE, SODIUM CHLORIDE, SODIUM BICARBONATE, POTASSIUM CHLORIDE AND SODIUM PHOSPHATE DIBASIC DIHYDRATE 1500 ML/HR: 3.68; 3.05; 6.34; 3.09; .314; .187 INJECTION INTRAVENOUS at 10:32

## 2025-01-09 RX ADMIN — NYSTATIN: 100000 POWDER TOPICAL at 08:30

## 2025-01-09 RX ADMIN — CALCIUM CHLORIDE, MAGNESIUM CHLORIDE, SODIUM CHLORIDE, SODIUM BICARBONATE, POTASSIUM CHLORIDE AND SODIUM PHOSPHATE DIBASIC DIHYDRATE 1500 ML/HR: 3.68; 3.05; 6.34; 3.09; .314; .187 INJECTION INTRAVENOUS at 07:03

## 2025-01-09 RX ADMIN — INSULIN GLARGINE 35 UNITS: 100 INJECTION, SOLUTION SUBCUTANEOUS at 08:29

## 2025-01-09 RX ADMIN — NALOXEGOL OXALATE 25 MG: 25 TABLET, FILM COATED ORAL at 08:29

## 2025-01-09 RX ADMIN — PHENYLEPHRINE HYDROCHLORIDE 0.5 MCG/KG/MIN: 10 INJECTION INTRAVENOUS at 13:45

## 2025-01-09 RX ADMIN — CALCIUM CHLORIDE, MAGNESIUM CHLORIDE, SODIUM CHLORIDE, SODIUM BICARBONATE, POTASSIUM CHLORIDE AND SODIUM PHOSPHATE DIBASIC DIHYDRATE 1500 ML/HR: 3.68; 3.05; 6.34; 3.09; .314; .187 INJECTION INTRAVENOUS at 10:31

## 2025-01-09 RX ADMIN — INSULIN HUMAN 10 UNITS: 100 INJECTION, SOLUTION PARENTERAL at 05:09

## 2025-01-09 RX ADMIN — FAMOTIDINE 20 MG: 10 INJECTION, SOLUTION INTRAVENOUS at 08:29

## 2025-01-09 RX ADMIN — SENNOSIDES AND DOCUSATE SODIUM 2 TABLET: 50; 8.6 TABLET ORAL at 08:29

## 2025-01-09 RX ADMIN — INSULIN HUMAN 10 UNITS: 100 INJECTION, SOLUTION PARENTERAL at 00:25

## 2025-01-09 RX ADMIN — CALCIUM CHLORIDE, MAGNESIUM CHLORIDE, SODIUM CHLORIDE, SODIUM BICARBONATE, POTASSIUM CHLORIDE AND SODIUM PHOSPHATE DIBASIC DIHYDRATE 1500 ML/HR: 3.68; 3.05; 6.34; 3.09; .314; .187 INJECTION INTRAVENOUS at 21:33

## 2025-01-09 RX ADMIN — METHYLNALTREXONE BROMIDE 12 MG: 12 INJECTION, SOLUTION SUBCUTANEOUS at 11:54

## 2025-01-09 RX ADMIN — FAMOTIDINE 20 MG: 10 INJECTION, SOLUTION INTRAVENOUS at 20:10

## 2025-01-09 RX ADMIN — HEPARIN SODIUM 26 UNITS/KG/HR: 10000 INJECTION, SOLUTION INTRAVENOUS at 10:52

## 2025-01-09 RX ADMIN — Medication 75 MCG/HR: at 17:53

## 2025-01-09 RX ADMIN — CHLORHEXIDINE GLUCONATE 0.12% ORAL RINSE 15 ML: 1.2 LIQUID ORAL at 20:10

## 2025-01-09 RX ADMIN — CALCIUM CHLORIDE, MAGNESIUM CHLORIDE, SODIUM CHLORIDE, SODIUM BICARBONATE, POTASSIUM CHLORIDE AND SODIUM PHOSPHATE DIBASIC DIHYDRATE 1500 ML/HR: 3.68; 3.05; 6.34; 3.09; .314; .187 INJECTION INTRAVENOUS at 03:17

## 2025-01-09 RX ADMIN — BUMETANIDE 2 MG: 0.25 INJECTION INTRAMUSCULAR; INTRAVENOUS at 02:07

## 2025-01-09 RX ADMIN — CALCIUM CHLORIDE, MAGNESIUM CHLORIDE, SODIUM CHLORIDE, SODIUM BICARBONATE, POTASSIUM CHLORIDE AND SODIUM PHOSPHATE DIBASIC DIHYDRATE 1500 ML/HR: 3.68; 3.05; 6.34; 3.09; .314; .187 INJECTION INTRAVENOUS at 00:02

## 2025-01-09 RX ADMIN — CALCIUM CHLORIDE, MAGNESIUM CHLORIDE, SODIUM CHLORIDE, SODIUM BICARBONATE, POTASSIUM CHLORIDE AND SODIUM PHOSPHATE DIBASIC DIHYDRATE 1500 ML/HR: 3.68; 3.05; 6.34; 3.09; .314; .187 INJECTION INTRAVENOUS at 17:30

## 2025-01-09 RX ADMIN — AMPICILLIN SODIUM, SULBACTAM SODIUM 3 G: 2; 1 INJECTION, POWDER, FOR SOLUTION INTRAMUSCULAR; INTRAVENOUS at 02:09

## 2025-01-09 RX ADMIN — Medication 1750 MG: at 17:53

## 2025-01-09 RX ADMIN — HEPARIN SODIUM 28 UNITS/KG/HR: 10000 INJECTION, SOLUTION INTRAVENOUS at 04:18

## 2025-01-09 RX ADMIN — POLYETHYLENE GLYCOL 3350 17 G: 17 POWDER, FOR SOLUTION ORAL at 08:29

## 2025-01-09 RX ADMIN — CHLORHEXIDINE GLUCONATE 0.12% ORAL RINSE 15 ML: 1.2 LIQUID ORAL at 08:29

## 2025-01-09 RX ADMIN — CALCIUM CHLORIDE, MAGNESIUM CHLORIDE, SODIUM CHLORIDE, SODIUM BICARBONATE, POTASSIUM CHLORIDE AND SODIUM PHOSPHATE DIBASIC DIHYDRATE 1500 ML/HR: 3.68; 3.05; 6.34; 3.09; .314; .187 INJECTION INTRAVENOUS at 03:16

## 2025-01-09 RX ADMIN — BUMETANIDE 2 MG: 0.25 INJECTION INTRAMUSCULAR; INTRAVENOUS at 14:51

## 2025-01-09 RX ADMIN — Medication 100 MCG/HR: at 07:07

## 2025-01-09 RX ADMIN — NYSTATIN: 100000 POWDER TOPICAL at 20:10

## 2025-01-09 RX ADMIN — HEPARIN SODIUM 25 UNITS/KG/HR: 10000 INJECTION, SOLUTION INTRAVENOUS at 17:53

## 2025-01-09 RX ADMIN — INSULIN HUMAN 10 UNITS: 100 INJECTION, SOLUTION PARENTERAL at 11:54

## 2025-01-09 RX ADMIN — CALCIUM CHLORIDE, MAGNESIUM CHLORIDE, SODIUM CHLORIDE, SODIUM BICARBONATE, POTASSIUM CHLORIDE AND SODIUM PHOSPHATE DIBASIC DIHYDRATE 1500 ML/HR: 3.68; 3.05; 6.34; 3.09; .314; .187 INJECTION INTRAVENOUS at 00:03

## 2025-01-09 RX ADMIN — CALCIUM CHLORIDE, MAGNESIUM CHLORIDE, SODIUM CHLORIDE, SODIUM BICARBONATE, POTASSIUM CHLORIDE AND SODIUM PHOSPHATE DIBASIC DIHYDRATE 1500 ML/HR: 3.68; 3.05; 6.34; 3.09; .314; .187 INJECTION INTRAVENOUS at 07:02

## 2025-01-09 NOTE — NURSING NOTE
CRRT rounds completed. Treatment plan  reviewed with ROB Kinsey. Orders and documentation reviewed. Prescription for CRRT machine reviewed with nurse- any needed adjustments made at this time.  Dressing to dialysis access visualized and intact. Dressing change due1-15-25 ___________.  Primary nurse encouraged to call [phone]  or on call dialysis nurse for assistance or any questions.

## 2025-01-09 NOTE — CASE MANAGEMENT/SOCIAL WORK
Continued Stay Note  [facility]     Patient Name: Natalia Arzate  MRN: [id number]  Today's Date: 1/9/2025    Admit Date: 1/2/2025    Plan: ongoing   Discharge Plan       Row Name 01/09/25 1239       Plan    Plan ongoing    Patient/Family in Agreement with Plan other (see comments)    Plan Comments Discussed in MDR, patient remains intubated/sedated. CRRT initiated on 1/8, no BM. D/C TBD. CM will continue to follow.                   Discharge Codes    No documentation.                 Expected Discharge Date and Time       Expected Discharge Date Expected Discharge Time    Jan 17, 2025               Rj Valle RN

## 2025-01-09 NOTE — PLAN OF CARE
Goal Outcome Evaluation:  Plan of Care Reviewed With: parent        Progress: improving  Outcome Evaluation: VSS, pt's temp subtherapeutic, forced warm air blanket applied. CRRT removal rate increased to 150. Pt had bigemy, magnesium replaced, levophed changed to phenylepherine which was put on hold at 0227. Bumex 2mg given last night. Pt had total uop of 1202ml for the shift. 300ml of bile removed from OGT, bowel sounds still distant and tinkling. No bowel movement noted. Labs noted.  The following drips are infusing  Fentanyl 100mcg/hr  Versed 4mg/hr  Heparin 28u/kg/hr

## 2025-01-09 NOTE — PROGRESS NOTES
Intensive Care Follow-up     Hospital:  LOS: 7 days   Ms. Natalia Arzate, 39 y.o. female is followed for:   Acute respiratory failure with hypercapnia        Subjective     Ms. Arzate is a 40yo F with a history of HLD, Hypothyroidism, Afib, PE/DVT, Factor V Leiden mutation, and stroke who presented to [facility] with altered mental status. Labs there were significant for renal failure, hyperglycemia, and hypercapnic respiratory failure. Imaging consistent with basilar pulmonary infiltrates concerning for pneumonia. She was intubated and required the initiation of vasopressors. She was transferred to [facility] on 1/2/25 for ongoing care.      Since arrival, she has continued to require mechanical ventilation along with vasopressors. She has been started on Vancomycin, Flagyl and Cefepime. Cultures are significant for MRSA in the sputum along with gemella sanguinis in the blood.      Nephrology has been following for MARIAA with poor urine output and hyperkalemia.      On the morning of 1/4/25, she developed worsening hypoxia despite an FiO2 of 100% and PEEP of 16. Imaging showed white out of the left lung. Bronchoscopy was performed with mucopurulent secretions suctioned from the left upper and left lower lobes. Repeat CXR after bronchoscopy showed some improvement in left upper lobe aeration with persistent left lower lobe disease vs pleural effusion  Interval History:  The chart has been reviewed.  The patient was started on CRRT and I have been able to pull 150 mL of fluid every hour.  She has not been tolerating tube feedings secondary to increased residuals.  She has not yet had a bowel movement despite proper medications.  On the ventilator she is still requiring 50% FiO2 with a PEEP of 14 with saturations in the low 90s.  Culture data has been reviewed and remains unchanged.  Chest x-ray has been reviewed and remains with bilateral infiltrates consistent with probable pneumonia and also some volume overload consistent  with pulmonary edema.    The patient's past medical, surgical and social history were reviewed and updated in Epic as appropriate.        Objective     Infusions:  fentanyl 10 mcg/mL,  mcg/hr, Last Rate: 100 mcg/hr (01/09/25 0830)  heparin, 26 Units/kg/hr, Last Rate: 26 Units/kg/hr (01/09/25 1052)  Midazolam 1 mg/mL 100mL NS, 1-10 mg/hr, Last Rate: Stopped (01/09/25 1500)  norepinephrine, 0.02-0.3 mcg/kg/min, Last Rate: Stopped (01/08/25 2156)  Pharmacy to Dose Heparin,   phenylephrine, 0.5-3 mcg/kg/min, Last Rate: 0.5 mcg/kg/min (01/09/25 1345)  Phoxillum BK4/2.5, 1,500 mL/hr, Last Rate: 1,500 mL/hr (01/09/25 1357)  Phoxillum BK4/2.5, 1,500 mL/hr, Last Rate: 1,500 mL/hr (01/09/25 1357)  Phoxillum BK4/2.5, 1,500 mL/hr, Last Rate: 1,500 mL/hr (01/09/25 1357)      Medications:  ampicillin-sulbactam, 3 g, Intravenous, Q12H  bumetanide, 2 mg, Intravenous, Q12H  chlorhexidine, 15 mL, Mouth/Throat, Q12H  famotidine, 20 mg, Intravenous, BID  insulin glargine, 35 Units, Subcutaneous, Q12H  insulin regular, 10 Units, Subcutaneous, Q6H  insulin regular, 2-9 Units, Subcutaneous, Q6H  methylnaltrexone, 12 mg, Subcutaneous, Every Other Day  nystatin, , Topical, Q12H  senna-docusate sodium, 2 tablet, Nasogastric, BID   And  polyethylene glycol, 17 g, Nasogastric, Daily  vancomycin, 1,750 mg, Intravenous, Q24H        Vital Sign Min/Max for last 24 hours  Temp  Min: 93.9 °F (34.4 °C)  Max: 100.6 °F (38.1 °C)   BP  Min: 95/57  Max: 129/70   Pulse  Min: 63  Max: 112   Resp  Min: 24  Max: 24   SpO2  Min: 91 %  Max: 100 %   Flow (L/min) (Oxygen Therapy)  Min: 50  Max: 50       Input/Output for last 24 hour shift  01/08 0701 - 01/09 0700  In: 3441.9 [I.V.:2702.7]  Out: 5962 [Urine:1832]   Mode: VC+/AC  FiO2 (%):  [45 %-50 %] 50 %  S RR:  [24] 24  S VT:  [380 mL] 380 mL  PEEP/CPAP (cm H2O):  [14 cm H20] 14 cm H20  MAP (cm H2O):  [19-23] 19  Objective:  General Appearance:  Uncomfortable, ill-appearing and in no acute distress  "(Sedated).    Vital signs: (most recent): Blood pressure 116/62, pulse 74, temperature 97.5 °F (36.4 °C), temperature source Oral, resp. rate 24, height 162.6 cm (64.02\"), weight (!) 167 kg (369 lb), SpO2 95%, not currently breastfeeding.    HEENT: (Endotracheal tube in place.)    Lungs:  Normal effort.  There are decreased breath sounds.  No rales, wheezes or rhonchi.    Heart: Normal rate.  Regular rhythm.  S1 normal and S2 normal.  No murmur.   Chest: Symmetric chest wall expansion.   Abdomen: Abdomen is soft and non-distended.  Bowel sounds are normal.   There is no abdominal tenderness.   There is no mass.   Extremities: There is dependent edema.    Neurological: (Sedated but arousable.  Not following commands at this time.).    Pupils:  Pupils are equal, round, and reactive to light.  Pupils are equal.   Skin:  Warm.                Results from last 7 days   Lab Units 01/09/25  0531 01/08/25  0308 01/07/25  0433   WBC 10*3/mm3 5.46 8.99 8.54   HEMOGLOBIN g/dL 7.2* 7.2* 7.7*   PLATELETS 10*3/mm3 136* 177 176     Results from last 7 days   Lab Units 01/09/25  0902 01/09/25  0018 01/08/25 2059 01/08/25  1631   SODIUM mmol/L 137 137 143 142   POTASSIUM mmol/L 4.6 4.7 5.0 5.6*   CO2 mmol/L 18.0* 20.0* 21.0* 19.0*   BUN mg/dL 34* 48* 53* 68*   CREATININE mg/dL 1.55* 2.31* 2.58* 3.57*   MAGNESIUM mg/dL 2.3 2.2 1.8 1.9   PHOSPHORUS mg/dL 5.3* 5.7*  --  6.5*   GLUCOSE mg/dL 160* 193* 206* 275*     Estimated Creatinine Clearance: 77.4 mL/min (A) (by C-G formula based on SCr of 1.55 mg/dL (H)).    Results from last 7 days   Lab Units 01/09/25  0415   PH, ARTERIAL pH units 7.221*   PCO2, ARTERIAL mm Hg 47.0*   PO2 ART mm Hg 110.0*         I reviewed the patient's results and images.     Assessment & Plan   Impression        Acute respiratory failure with hypercapnia    MARIAA (acute kidney injury)    Type 2 diabetes mellitus with hyperglycemia    Sepsis       Plan        We will continue to try and make progress with " weaning of the ventilator.  I suspect that the ultrafiltration from a CRRT will be helpful.  Continue with heparin drip for underlying clot.  We will work on her bowel regimen today.  Start Relistor now.  Hold on tube feedings and restart with a slow rate later to see if we can get her to tolerate this.  Glucose control as before.  Her care has been discussed with family at the bedside and they voiced understanding of her overall level of illness.  She will remain a full code for now.  This patient is currently critically ill and at imminent risk of death.    Plan of care and goals reviewed with mulitdisciplinary/antibiotic stewardship team during rounds.   I discussed the patient's findings and my recommendations with family and nursing staff     High level of risk due to:  drug(s) requiring intensive monitoring for toxicity and parenteral controlled substances.    Time spent Critical care 39 min (exclusive of procedure time)  including high complexity decision making to assess, manipulate, and support vital organ system failure in this individual who has impairment of one or more vital organ systems such that there is a high probability of imminent or life threatening deterioration in the patient’s condition.      Saji De Jesus MD, [facility]  Pulmonology and Critical Care Medicine

## 2025-01-09 NOTE — PLAN OF CARE
Goal Outcome Evaluation:  Plan of Care Reviewed With: patient, parent        Progress: no change     Pt remains intubated and on versed and fentanyl, heparin drip continues, HD cath placed, CRRT started, bicarb gtt stopped,TF continues to be on hold, KUB done this am,  NGT output 2.7 L, No BM, movantik started, WOC consulted, mother updated.

## 2025-01-09 NOTE — PROGRESS NOTES
Pharmacy to Dose Heparin Infusion Note  Natalia Arzate is a 39 y.o. female receiving heparin infusion.     Therapy for (VTE/Cardiac): VTE  Patient Weight: 159 kg  Initial Bolus (Y/N): No  Any Bolus (Y/N): Yes  Signs or Symptoms of Bleeding: None currently - has been held previously in admission for dropping H/H.     VTE (PE/DVT)  Initial rate: 18 units/kg/hr (Max 1,500 units/hr)    Anti-Xa Bolus   Dose Infusion Hold   Time Infusion Rate Change (units/kg/hr) Repeat  Anti-Xa   < 0.11 50 units/kg  (4000 units Max) None Increase by  4 units/kg/hr 6 hours   0.11 - 0.19 25 units/kg  (2000 units Max) None Increase by  3 units/kg/hr 6 hours   0.2 - 0.29 0 None Increase by  2 units/kg/hr 6 hours   0.3 - 0.7 0 None No Change 6 hours (after 2 consecutive levels in range check qAM)   0.71 - 0.8 0 None Decrease by  1 units/kg/hr 6 hours   0.81 - 0.9 0 None Decrease by  2 units/kg/hr 6 hours   0.91 - 1 0 60 minutes Decrease by  3 units/kg/hr 6 hours   >1 0 Hold  After Anti-Xa less than 0.7 decrease previous rate by  4 units/kg/hr  Every 2 hours until Anti-Xa less than 0.7 then when infusion restarts in 6 hours     Results from last 7 days   Lab Units 01/09/25  0531 01/08/25  0308 01/07/25  0433 01/06/25  1956 01/06/25  1319 01/03/25  0433 01/02/25  1538   INR   --   --   --   --  1.24*  --  1.15*   HEMOGLOBIN g/dL 7.2* 7.2* 7.7*   < >  --    < >  --    HEMATOCRIT % 24.9* 25.9* 26.8*   < >  --    < >  --    PLATELETS 10*3/mm3 136* 177 176   < >  --    < >  --     < > = values in this interval not displayed.       Date   Time   Anti-Xa Current Rate (units/kg/hr) Bolus   (units) Rate Change   (units/kg/hr) New Rate (units/kg/hr) Repeat  Anti-Xa Comments /  Pump Check    1/2 1500 pending -- -- +9 9 0000 D/w RN; will await labs, but expect this to be an aPTT assay    1/2 N/a N/a 9 -- -- 9 2100 Baseline anti-Xa 0.1, re-timed next check for 2100.    1/2 2021 0.1 9 4000 +4 13 0400 DW RN   1/3 0500 0.10 13 4000 +4 17 1200 D/w RN    1/3 1200 0.19 17 4000 +1 18 2000 D/w RN   1/3 2035 0.23 18 -- +2 20 0400 D/w RN   1/4 0545 0.23 18 -- +2 22 1200 D/w RN     1/4   0815   HOLD   22   HOLD   HOLD   HOLD   HOLD D/w ROB Kinsey & APRN; hold heparin drip for now 2/2 H&H dropping; no overt bleeding noted;   MAR placeholder entered.   1/6 1300 pending -- -- +9 9 2000 D/w Dr. De Jesus and Tio, ROB. Restart heparin gtt. Will not give initial bolus given concern for dropping hgb. Ok for future boluses.     1/6 1956 0.10 9 4000 +4 13 0600 DW RN. Continue watching hgb, currently stable   1/7 0600 0.10 13 4000 +4 17 1200 D/w RN   1/7 1204 0.10 17 4000 +4 21 2000 D/w ROB [place]    1/7 2023 0.17 21 2000 +3 24 0600 D/w ROB [place]   1/8 0650 0.23 24 -- +1 26 1200 D/w RN   1/8 1250 0.31 26 -- -- 26 2000 D/w RN   1/8 2030 0.27 26 -- +2 28 0300 D/w ROB [place]   1/9 0300 0.59 28 -- -- 28 1000 D/w RN   1/9 0902 0.87 28 -- -2 26 1500 DW ROB Kinsey. H/H stable                                     Thank you,    David Angelo, PharmD  01/09/25  11:58 EST

## 2025-01-09 NOTE — PROGRESS NOTES
LOS: 7 days   Patient Care Team:  Bladimir Calle PA-C as PCP - General (Physician Assistant)    Chief Complaint: MARIAA   Hyperkalemia    Subjective   Tolerating CRRT well. UF ~ 150cc net negative. Labs reviewed ABG improved but mild met acidosis is persistent.     History taken from: family RN    Objective     Vital Sign Min/Max for last 24 hours  Temp  Min: 93.9 °F (34.4 °C)  Max: 100.6 °F (38.1 °C)   BP  Min: 95/57  Max: 129/70   Pulse  Min: 63  Max: 112   Resp  Min: 24  Max: 24   SpO2  Min: 91 %  Max: 100 %   Flow (L/min) (Oxygen Therapy)  Min: 50  Max: 50   Weight  Min: 167 kg (369 lb)  Max: 167 kg (369 lb)         I/O this shift:  In: 467.9 [I.V.:367.9; Other:100]  Out: 1190 [Urine:200]  I/O last 3 completed shifts:  In: 5146.6 [I.V.:3636.4; Other:195; NG/GT:536; IV Piggyback:779.2]  Out: 6289 [Urine:2159; Emesis/NG output:3000]    Objective    Gen: intubated and sedated.    HENT: NC, AT  NECK: Supple, ETT in place  LUNGS: Intubated on MV  CVS: S1/S2 audible, RRR, no murmur   Abd: Soft, NT, ND, BS+   Ext: +2 UE and LE edema, no cyanosis   CNS: Intubated and sedated.   Psy: Intubated and sedated.   Skin: Warm, dry and intact  : Urena cath +    Results Review:     I reviewed the patient's new clinical results.    WBC WBC   Date Value Ref Range Status   01/09/2025 5.46 3.40 - 10.80 10*3/mm3 Final   01/08/2025 8.99 3.40 - 10.80 10*3/mm3 Final   01/07/2025 8.54 3.40 - 10.80 10*3/mm3 Final   01/06/2025 8.24 3.40 - 10.80 10*3/mm3 Final      HGB Hemoglobin   Date Value Ref Range Status   01/09/2025 7.2 (L) 12.0 - 15.9 g/dL Final   01/08/2025 7.2 (L) 12.0 - 15.9 g/dL Final   01/07/2025 7.7 (L) 12.0 - 15.9 g/dL Final   01/06/2025 7.5 (L) 12.0 - 15.9 g/dL Final   01/06/2025 7.5 (L) 12.0 - 15.9 g/dL Final      HCT Hematocrit   Date Value Ref Range Status   01/09/2025 24.9 (L) 34.0 - 46.6 % Final   01/08/2025 25.9 (L) 34.0 - 46.6 % Final   01/07/2025 26.8 (L) 34.0 - 46.6 % Final   01/06/2025 25.4 (L) 34.0 - 46.6 %  "Final   01/06/2025 26.1 (L) 34.0 - 46.6 % Final      Platlets No results found for: \"LABPLAT\"   MCV MCV   Date Value Ref Range Status   01/09/2025 99.6 (H) 79.0 - 97.0 fL Final   01/08/2025 104.0 (H) 79.0 - 97.0 fL Final   01/07/2025 99.6 (H) 79.0 - 97.0 fL Final   01/06/2025 101.2 (H) 79.0 - 97.0 fL Final          Sodium Sodium   Date Value Ref Range Status   01/09/2025 137 136 - 145 mmol/L Final   01/09/2025 137 136 - 145 mmol/L Final   01/08/2025 143 136 - 145 mmol/L Final   01/08/2025 142 136 - 145 mmol/L Final   01/08/2025 141 136 - 145 mmol/L Final   01/07/2025 145 136 - 145 mmol/L Final      Potassium Potassium   Date Value Ref Range Status   01/09/2025 4.6 3.5 - 5.2 mmol/L Final   01/09/2025 4.7 3.5 - 5.2 mmol/L Final   01/08/2025 5.0 3.5 - 5.2 mmol/L Final   01/08/2025 5.6 (H) 3.5 - 5.2 mmol/L Final   01/08/2025 5.1 3.5 - 5.2 mmol/L Final   01/07/2025 4.2 3.5 - 5.2 mmol/L Final      Chloride Chloride   Date Value Ref Range Status   01/09/2025 100 98 - 107 mmol/L Final   01/09/2025 99 98 - 107 mmol/L Final   01/08/2025 106 98 - 107 mmol/L Final   01/08/2025 104 98 - 107 mmol/L Final   01/08/2025 103 98 - 107 mmol/L Final   01/07/2025 108 (H) 98 - 107 mmol/L Final      CO2 CO2   Date Value Ref Range Status   01/09/2025 18.0 (L) 22.0 - 29.0 mmol/L Final   01/09/2025 20.0 (L) 22.0 - 29.0 mmol/L Final   01/08/2025 21.0 (L) 22.0 - 29.0 mmol/L Final   01/08/2025 19.0 (L) 22.0 - 29.0 mmol/L Final   01/08/2025 17.0 (L) 22.0 - 29.0 mmol/L Final   01/07/2025 19.0 (L) 22.0 - 29.0 mmol/L Final      BUN BUN   Date Value Ref Range Status   01/09/2025 34 (H) 6 - 20 mg/dL Final   01/09/2025 48 (H) 6 - 20 mg/dL Final   01/08/2025 53 (H) 6 - 20 mg/dL Final   01/08/2025 68 (H) 6 - 20 mg/dL Final   01/08/2025 65 (H) 6 - 20 mg/dL Final   01/07/2025 55 (H) 6 - 20 mg/dL Final      Creatinine Creatinine   Date Value Ref Range Status   01/09/2025 1.55 (H) 0.57 - 1.00 mg/dL Final   01/09/2025 2.31 (H) 0.57 - 1.00 mg/dL Final " "  01/08/2025 2.58 (H) 0.57 - 1.00 mg/dL Final   01/08/2025 3.57 (H) 0.57 - 1.00 mg/dL Final   01/08/2025 3.47 (H) 0.57 - 1.00 mg/dL Final   01/07/2025 3.27 (H) 0.57 - 1.00 mg/dL Final      Calcium Calcium   Date Value Ref Range Status   01/09/2025 8.7 8.6 - 10.5 mg/dL Final   01/09/2025 8.7 8.6 - 10.5 mg/dL Final   01/08/2025 8.3 (L) 8.6 - 10.5 mg/dL Final   01/08/2025 8.8 8.6 - 10.5 mg/dL Final   01/08/2025 8.6 8.6 - 10.5 mg/dL Final   01/07/2025 8.4 (L) 8.6 - 10.5 mg/dL Final      PO4 No results found for: \"CAPO4\"   Albumin Albumin   Date Value Ref Range Status   01/09/2025 2.9 (L) 3.5 - 5.2 g/dL Final   01/09/2025 3.0 (L) 3.5 - 5.2 g/dL Final   01/08/2025 2.8 (L) 3.5 - 5.2 g/dL Final   01/08/2025 3.1 (L) 3.5 - 5.2 g/dL Final      Magnesium Magnesium   Date Value Ref Range Status   01/09/2025 2.3 1.6 - 2.6 mg/dL Final   01/09/2025 2.2 1.6 - 2.6 mg/dL Final   01/08/2025 1.8 1.6 - 2.6 mg/dL Final   01/08/2025 1.9 1.6 - 2.6 mg/dL Final   01/07/2025 1.9 1.6 - 2.6 mg/dL Final      Uric Acid No results found for: \"URICACID\"         Results from last 7 days   Lab Units 01/09/25  0902 01/09/25  0531 01/09/25  0018 01/08/25 2059 01/08/25  1631 01/08/25  0308 01/07/25  0433 01/06/25 1956   SODIUM mmol/L 137  --  137 143 142 141 145  --    POTASSIUM mmol/L 4.6  --  4.7 5.0 5.6* 5.1 4.2  --    CHLORIDE mmol/L 100  --  99 106 104 103 108*  --    CO2 mmol/L 18.0*  --  20.0* 21.0* 19.0* 17.0* 19.0*  --    BUN mg/dL 34*  --  48* 53* 68* 65* 55*  --    CREATININE mg/dL 1.55*  --  2.31* 2.58* 3.57* 3.47* 3.27*  --    CALCIUM mg/dL 8.7  --  8.7 8.3* 8.8 8.6 8.4*  --    ALBUMIN g/dL 2.9*  --  3.0*  --  2.8* 3.1*  --   --    WBC 10*3/mm3  --  5.46  --   --   --  8.99 8.54 8.24   HEMOGLOBIN g/dL  --  7.2*  --   --   --  7.2* 7.7* 7.5*  7.5*   PLATELETS 10*3/mm3  --  136*  --   --   --  177 176 178       Intake/Output Summary (Last 24 hours) at 1/9/2025 1304  Last data filed at 1/9/2025 1200  Gross per 24 hour   Intake 3511.8 ml "   Output 6977 ml   Net -3465.2 ml         Medication Review: Yes    Assessment & Plan       Acute respiratory failure with hypercapnia    MARIAA (acute kidney injury)    Type 2 diabetes mellitus with hyperglycemia    Sepsis      Assessment & Plan:    1-MARIAA- non oliguric - Pre-renal azotemia vs sepsis related MARIAA. UA - RBC 3-5, + protein. 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 At home on lisinopril, metformin and topiramate. Initially was improving with IV hydration and hemodynamic support but now worsening and UOP has decreased.   2- Hyperkalemia - Management with CRRT  3- Hyperglycemia- On admission. Improved.   4- Anemia- Transfuse at hB<7  5- Shock- On pressor support. So far no infectious source identified.   6- hx of Factor V Leiden deficiency   7- hx of DM   8- Respiratory distress - suspicion of aspiration pneumonia   9- Hx of Gout   10. Nutrition: On high protein feed discussed with dietician overall protein intake is still <1g/kg/day     Plan:  - Worsening renal function. Unable to maintain negative fluid balance despite good UOP. Given worsening volume status and respiratory status with increase vent requirement. Will initiate CRRT for optimization of volume status and correction of acidosis. Discussed with patient's mother on the ph. She is agreeable for renal replacement therapy  - Continue CRRT with current prescription.   - Continue with diuretics for now  - Monitor H/H and transfuse for Hgb less than 7.0   - Dose meds to eGFR<35ml/min  - Maintain MAP 65 or above.     Discussed with RN and Critical care team  Stanley Velasco MD  01/09/25  13:04 EST

## 2025-01-10 LAB
ALBUMIN SERPL-MCNC: 3.1 G/DL (ref 3.5–5.2)
ALBUMIN SERPL-MCNC: 3.2 G/DL (ref 3.5–5.2)
ALBUMIN SERPL-MCNC: 3.2 G/DL (ref 3.5–5.2)
ALBUMIN SERPL-MCNC: 3.4 G/DL (ref 3.5–5.2)
ALBUMIN/GLOB SERPL: 0.8 G/DL
ALP SERPL-CCNC: 212 U/L (ref 39–117)
ALT SERPL W P-5'-P-CCNC: 8 U/L (ref 1–33)
ANION GAP SERPL CALCULATED.3IONS-SCNC: 17 MMOL/L (ref 5–15)
ANION GAP SERPL CALCULATED.3IONS-SCNC: 18 MMOL/L (ref 5–15)
ANION GAP SERPL CALCULATED.3IONS-SCNC: 19 MMOL/L (ref 5–15)
ANION GAP SERPL CALCULATED.3IONS-SCNC: 23 MMOL/L (ref 5–15)
ARTERIAL PATENCY WRIST A: ABNORMAL
AST SERPL-CCNC: 22 U/L (ref 1–32)
ATMOSPHERIC PRESS: ABNORMAL MM[HG]
BACTERIA SPEC AEROBE CULT: NORMAL
BASE EXCESS BLDA CALC-SCNC: -7.7 MMOL/L (ref 0–2)
BASOPHILS # BLD AUTO: 0.03 10*3/MM3 (ref 0–0.2)
BASOPHILS NFR BLD AUTO: 0.6 % (ref 0–1.5)
BDY SITE: ABNORMAL
BILIRUB SERPL-MCNC: 0.2 MG/DL (ref 0–1.2)
BODY TEMPERATURE: 37
BUN SERPL-MCNC: 15 MG/DL (ref 6–20)
BUN SERPL-MCNC: 16 MG/DL (ref 6–20)
BUN SERPL-MCNC: 19 MG/DL (ref 6–20)
BUN SERPL-MCNC: 21 MG/DL (ref 6–20)
BUN/CREAT SERPL: 17 (ref 7–25)
BUN/CREAT SERPL: 17.9 (ref 7–25)
BUN/CREAT SERPL: 20.8 (ref 7–25)
BUN/CREAT SERPL: 23.3 (ref 7–25)
CA-I SERPL ISE-MCNC: 1.12 MMOL/L (ref 1.15–1.3)
CA-I SERPL ISE-MCNC: 1.13 MMOL/L (ref 1.15–1.3)
CA-I SERPL ISE-MCNC: 1.14 MMOL/L (ref 1.15–1.3)
CA-I SERPL ISE-MCNC: 1.15 MMOL/L (ref 1.15–1.3)
CALCIUM SPEC-SCNC: 8.7 MG/DL (ref 8.6–10.5)
CALCIUM SPEC-SCNC: 8.9 MG/DL (ref 8.6–10.5)
CHLORIDE SERPL-SCNC: 101 MMOL/L (ref 98–107)
CHLORIDE SERPL-SCNC: 95 MMOL/L (ref 98–107)
CHLORIDE SERPL-SCNC: 97 MMOL/L (ref 98–107)
CHLORIDE SERPL-SCNC: 99 MMOL/L (ref 98–107)
CHOLEST SERPL-MCNC: 114 MG/DL (ref 0–200)
CO2 BLDA-SCNC: 20.7 MMOL/L (ref 22–33)
CO2 SERPL-SCNC: 14 MMOL/L (ref 22–29)
CO2 SERPL-SCNC: 17 MMOL/L (ref 22–29)
CO2 SERPL-SCNC: 19 MMOL/L (ref 22–29)
CO2 SERPL-SCNC: 20 MMOL/L (ref 22–29)
COHGB MFR BLD: 1.9 % (ref 0–2)
CREAT SERPL-MCNC: 0.77 MG/DL (ref 0.57–1)
CREAT SERPL-MCNC: 0.84 MG/DL (ref 0.57–1)
CREAT SERPL-MCNC: 0.9 MG/DL (ref 0.57–1)
CREAT SERPL-MCNC: 1.12 MG/DL (ref 0.57–1)
CRP SERPL-MCNC: 13.22 MG/DL (ref 0–0.5)
DEPRECATED RDW RBC AUTO: 62.5 FL (ref 37–54)
EGFRCR SERPLBLD CKD-EPI 2021: 101.4 ML/MIN/1.73
EGFRCR SERPLBLD CKD-EPI 2021: 64.7 ML/MIN/1.73
EGFRCR SERPLBLD CKD-EPI 2021: 84.1 ML/MIN/1.73
EGFRCR SERPLBLD CKD-EPI 2021: 91.3 ML/MIN/1.73
EOSINOPHIL # BLD AUTO: 0.2 10*3/MM3 (ref 0–0.4)
EOSINOPHIL NFR BLD AUTO: 4 % (ref 0.3–6.2)
EPAP: 0
ERYTHROCYTE [DISTWIDTH] IN BLOOD BY AUTOMATED COUNT: 17.2 % (ref 12.3–15.4)
GLOBULIN UR ELPH-MCNC: 4.2 GM/DL
GLUCOSE BLDC GLUCOMTR-MCNC: 104 MG/DL (ref 70–130)
GLUCOSE BLDC GLUCOMTR-MCNC: 105 MG/DL (ref 70–130)
GLUCOSE BLDC GLUCOMTR-MCNC: 131 MG/DL (ref 70–130)
GLUCOSE BLDC GLUCOMTR-MCNC: 80 MG/DL (ref 70–130)
GLUCOSE BLDC GLUCOMTR-MCNC: 91 MG/DL (ref 70–130)
GLUCOSE SERPL-MCNC: 76 MG/DL (ref 65–99)
GLUCOSE SERPL-MCNC: 97 MG/DL (ref 65–99)
GLUCOSE SERPL-MCNC: 97 MG/DL (ref 65–99)
GLUCOSE SERPL-MCNC: 99 MG/DL (ref 65–99)
HCO3 BLDA-SCNC: 19.3 MMOL/L (ref 20–26)
HCT VFR BLD AUTO: 24.7 % (ref 34–46.6)
HCT VFR BLD CALC: 22.4 % (ref 38–51)
HGB BLD-MCNC: 7.1 G/DL (ref 12–15.9)
HGB BLDA-MCNC: 7.3 G/DL (ref 14–18)
IMM GRANULOCYTES # BLD AUTO: 0.13 10*3/MM3 (ref 0–0.05)
IMM GRANULOCYTES NFR BLD AUTO: 2.6 % (ref 0–0.5)
INHALED O2 CONCENTRATION: 35 %
IPAP: 0
LYMPHOCYTES # BLD AUTO: 0.76 10*3/MM3 (ref 0.7–3.1)
LYMPHOCYTES NFR BLD AUTO: 15.2 % (ref 19.6–45.3)
MAGNESIUM SERPL-MCNC: 2.3 MG/DL (ref 1.6–2.6)
MAGNESIUM SERPL-MCNC: 2.4 MG/DL (ref 1.6–2.6)
MCH RBC QN AUTO: 28.3 PG (ref 26.6–33)
MCHC RBC AUTO-ENTMCNC: 28.7 G/DL (ref 31.5–35.7)
MCV RBC AUTO: 98.4 FL (ref 79–97)
METHGB BLD QL: 0.5 % (ref 0–1.5)
MODALITY: ABNORMAL
MONOCYTES # BLD AUTO: 0.41 10*3/MM3 (ref 0.1–0.9)
MONOCYTES NFR BLD AUTO: 8.2 % (ref 5–12)
NEUTROPHILS NFR BLD AUTO: 3.46 10*3/MM3 (ref 1.7–7)
NEUTROPHILS NFR BLD AUTO: 69.4 % (ref 42.7–76)
NRBC BLD AUTO-RTO: 0 /100 WBC (ref 0–0.2)
OXYHGB MFR BLDV: 94.2 % (ref 94–99)
PAW @ PEAK INSP FLOW SETTING VENT: 0 CMH2O
PCO2 BLDA: 46.1 MM HG (ref 35–45)
PCO2 TEMP ADJ BLD: 46.1 MM HG (ref 35–45)
PEEP RESPIRATORY: 14 CM[H2O]
PH BLDA: 7.23 PH UNITS (ref 7.35–7.45)
PH, TEMP CORRECTED: 7.23 PH UNITS
PHOSPHATE SERPL-MCNC: 4.9 MG/DL (ref 2.5–4.5)
PHOSPHATE SERPL-MCNC: 5.1 MG/DL (ref 2.5–4.5)
PHOSPHATE SERPL-MCNC: 5.1 MG/DL (ref 2.5–4.5)
PHOSPHATE SERPL-MCNC: 6.1 MG/DL (ref 2.5–4.5)
PLATELET # BLD AUTO: 144 10*3/MM3 (ref 140–450)
PMV BLD AUTO: 10.4 FL (ref 6–12)
PO2 BLDA: 88.5 MM HG (ref 83–108)
PO2 TEMP ADJ BLD: 88.5 MM HG (ref 83–108)
POTASSIUM SERPL-SCNC: 4.1 MMOL/L (ref 3.5–5.2)
POTASSIUM SERPL-SCNC: 4.3 MMOL/L (ref 3.5–5.2)
POTASSIUM SERPL-SCNC: 4.4 MMOL/L (ref 3.5–5.2)
POTASSIUM SERPL-SCNC: 4.6 MMOL/L (ref 3.5–5.2)
PROT SERPL-MCNC: 7.4 G/DL (ref 6–8.5)
RBC # BLD AUTO: 2.51 10*6/MM3 (ref 3.77–5.28)
SODIUM SERPL-SCNC: 132 MMOL/L (ref 136–145)
SODIUM SERPL-SCNC: 134 MMOL/L (ref 136–145)
SODIUM SERPL-SCNC: 135 MMOL/L (ref 136–145)
SODIUM SERPL-SCNC: 138 MMOL/L (ref 136–145)
TOTAL RATE: 0 BREATHS/MINUTE
TRIGL SERPL-MCNC: 368 MG/DL (ref 0–150)
UFH PPP CHRO-ACNC: 0.61 IU/ML (ref 0.3–0.7)
UFH PPP CHRO-ACNC: 0.62 IU/ML (ref 0.3–0.7)
VANCOMYCIN SERPL-MCNC: 8.7 MCG/ML (ref 5–40)
VENTILATOR MODE: ABNORMAL
WBC NRBC COR # BLD AUTO: 4.99 10*3/MM3 (ref 3.4–10.8)

## 2025-01-10 PROCEDURE — 94799 UNLISTED PULMONARY SVC/PX: CPT

## 2025-01-10 PROCEDURE — 25010000002 AMPICILLIN-SULBACTAM PER 1.5 G: Performed by: INTERNAL MEDICINE

## 2025-01-10 PROCEDURE — 82948 REAGENT STRIP/BLOOD GLUCOSE: CPT

## 2025-01-10 PROCEDURE — 82330 ASSAY OF CALCIUM: CPT | Performed by: INTERNAL MEDICINE

## 2025-01-10 PROCEDURE — 25010000002 HEPARIN (PORCINE) 25000-0.45 UT/250ML-% SOLUTION

## 2025-01-10 PROCEDURE — 99233 SBSQ HOSP IP/OBS HIGH 50: CPT | Performed by: INTERNAL MEDICINE

## 2025-01-10 PROCEDURE — 25010000002 VANCOMYCIN 1.75-0.9 GM/500ML-% SOLUTION

## 2025-01-10 PROCEDURE — 84478 ASSAY OF TRIGLYCERIDES: CPT | Performed by: INTERNAL MEDICINE

## 2025-01-10 PROCEDURE — 84100 ASSAY OF PHOSPHORUS: CPT | Performed by: INTERNAL MEDICINE

## 2025-01-10 PROCEDURE — 82465 ASSAY BLD/SERUM CHOLESTEROL: CPT | Performed by: INTERNAL MEDICINE

## 2025-01-10 PROCEDURE — 94003 VENT MGMT INPAT SUBQ DAY: CPT

## 2025-01-10 PROCEDURE — 80053 COMPREHEN METABOLIC PANEL: CPT | Performed by: INTERNAL MEDICINE

## 2025-01-10 PROCEDURE — 82375 ASSAY CARBOXYHB QUANT: CPT

## 2025-01-10 PROCEDURE — 83735 ASSAY OF MAGNESIUM: CPT | Performed by: INTERNAL MEDICINE

## 2025-01-10 PROCEDURE — 80202 ASSAY OF VANCOMYCIN: CPT

## 2025-01-10 PROCEDURE — 63710000001 INSULIN GLARGINE PER 5 UNITS: Performed by: INTERNAL MEDICINE

## 2025-01-10 PROCEDURE — 82805 BLOOD GASES W/O2 SATURATION: CPT

## 2025-01-10 PROCEDURE — 25010000002 BUMETANIDE PER 0.5 MG: Performed by: INTERNAL MEDICINE

## 2025-01-10 PROCEDURE — 85025 COMPLETE CBC W/AUTO DIFF WBC: CPT | Performed by: INTERNAL MEDICINE

## 2025-01-10 PROCEDURE — 86140 C-REACTIVE PROTEIN: CPT | Performed by: INTERNAL MEDICINE

## 2025-01-10 PROCEDURE — 85520 HEPARIN ASSAY: CPT

## 2025-01-10 PROCEDURE — 83050 HGB METHEMOGLOBIN QUAN: CPT

## 2025-01-10 RX ORDER — BUMETANIDE 0.25 MG/ML
3 INJECTION, SOLUTION INTRAMUSCULAR; INTRAVENOUS EVERY 12 HOURS
Status: DISCONTINUED | OUTPATIENT
Start: 2025-01-11 | End: 2025-01-11

## 2025-01-10 RX ORDER — ALBUMIN (HUMAN) 12.5 G/50ML
12.5 SOLUTION INTRAVENOUS AS NEEDED
Status: CANCELLED | OUTPATIENT
Start: 2025-01-11 | End: 2025-01-11

## 2025-01-10 RX ORDER — BUMETANIDE 0.25 MG/ML
1 INJECTION, SOLUTION INTRAMUSCULAR; INTRAVENOUS ONCE
Status: COMPLETED | OUTPATIENT
Start: 2025-01-10 | End: 2025-01-10

## 2025-01-10 RX ADMIN — AMPICILLIN SODIUM, SULBACTAM SODIUM 3 G: 2; 1 INJECTION, POWDER, FOR SOLUTION INTRAMUSCULAR; INTRAVENOUS at 16:18

## 2025-01-10 RX ADMIN — Medication 10 ML: at 10:10

## 2025-01-10 RX ADMIN — POLYETHYLENE GLYCOL 3350 17 G: 17 POWDER, FOR SOLUTION ORAL at 10:09

## 2025-01-10 RX ADMIN — SENNOSIDES AND DOCUSATE SODIUM 2 TABLET: 50; 8.6 TABLET ORAL at 10:09

## 2025-01-10 RX ADMIN — CALCIUM CHLORIDE, MAGNESIUM CHLORIDE, SODIUM CHLORIDE, SODIUM BICARBONATE, POTASSIUM CHLORIDE AND SODIUM PHOSPHATE DIBASIC DIHYDRATE 1500 ML/HR: 3.68; 3.05; 6.34; 3.09; .314; .187 INJECTION INTRAVENOUS at 08:00

## 2025-01-10 RX ADMIN — CALCIUM CHLORIDE, MAGNESIUM CHLORIDE, SODIUM CHLORIDE, SODIUM BICARBONATE, POTASSIUM CHLORIDE AND SODIUM PHOSPHATE DIBASIC DIHYDRATE 1500 ML/HR: 3.68; 3.05; 6.34; 3.09; .314; .187 INJECTION INTRAVENOUS at 04:07

## 2025-01-10 RX ADMIN — CALCIUM CHLORIDE, MAGNESIUM CHLORIDE, SODIUM CHLORIDE, SODIUM BICARBONATE, POTASSIUM CHLORIDE AND SODIUM PHOSPHATE DIBASIC DIHYDRATE 1500 ML/HR: 3.68; 3.05; 6.34; 3.09; .314; .187 INJECTION INTRAVENOUS at 00:52

## 2025-01-10 RX ADMIN — CHLORHEXIDINE GLUCONATE 0.12% ORAL RINSE 15 ML: 1.2 LIQUID ORAL at 20:51

## 2025-01-10 RX ADMIN — CALCIUM CHLORIDE, MAGNESIUM CHLORIDE, SODIUM CHLORIDE, SODIUM BICARBONATE, POTASSIUM CHLORIDE AND SODIUM PHOSPHATE DIBASIC DIHYDRATE 1500 ML/HR: 3.68; 3.05; 6.34; 3.09; .314; .187 INJECTION INTRAVENOUS at 11:01

## 2025-01-10 RX ADMIN — INSULIN GLARGINE 35 UNITS: 100 INJECTION, SOLUTION SUBCUTANEOUS at 10:08

## 2025-01-10 RX ADMIN — CHLORHEXIDINE GLUCONATE 0.12% ORAL RINSE 15 ML: 1.2 LIQUID ORAL at 10:10

## 2025-01-10 RX ADMIN — CALCIUM CHLORIDE, MAGNESIUM CHLORIDE, SODIUM CHLORIDE, SODIUM BICARBONATE, POTASSIUM CHLORIDE AND SODIUM PHOSPHATE DIBASIC DIHYDRATE 1500 ML/HR: 3.68; 3.05; 6.34; 3.09; .314; .187 INJECTION INTRAVENOUS at 11:00

## 2025-01-10 RX ADMIN — INSULIN GLARGINE 35 UNITS: 100 INJECTION, SOLUTION SUBCUTANEOUS at 21:03

## 2025-01-10 RX ADMIN — HEPARIN SODIUM 25 UNITS/KG/HR: 10000 INJECTION, SOLUTION INTRAVENOUS at 06:47

## 2025-01-10 RX ADMIN — BUMETANIDE 1 MG: 0.25 INJECTION INTRAMUSCULAR; INTRAVENOUS at 18:12

## 2025-01-10 RX ADMIN — AMPICILLIN SODIUM, SULBACTAM SODIUM 3 G: 2; 1 INJECTION, POWDER, FOR SOLUTION INTRAMUSCULAR; INTRAVENOUS at 02:35

## 2025-01-10 RX ADMIN — HEPARIN SODIUM 25 UNITS/KG/HR: 10000 INJECTION, SOLUTION INTRAVENOUS at 00:46

## 2025-01-10 RX ADMIN — ANTICOAGULANT CITRATE DEXTROSE SOLUTION FORMULA A 2 ML: 12.25; 11; 3.65 SOLUTION INTRAVENOUS at 18:12

## 2025-01-10 RX ADMIN — SENNOSIDES AND DOCUSATE SODIUM 2 TABLET: 50; 8.6 TABLET ORAL at 20:51

## 2025-01-10 RX ADMIN — BUMETANIDE 2 MG: 0.25 INJECTION INTRAMUSCULAR; INTRAVENOUS at 02:35

## 2025-01-10 RX ADMIN — Medication 1750 MG: at 18:12

## 2025-01-10 RX ADMIN — NYSTATIN: 100000 POWDER TOPICAL at 20:51

## 2025-01-10 RX ADMIN — FAMOTIDINE 20 MG: 10 INJECTION, SOLUTION INTRAVENOUS at 10:09

## 2025-01-10 RX ADMIN — HEPARIN SODIUM 25 UNITS/KG/HR: 10000 INJECTION, SOLUTION INTRAVENOUS at 14:06

## 2025-01-10 RX ADMIN — BUMETANIDE 2 MG: 0.25 INJECTION INTRAMUSCULAR; INTRAVENOUS at 16:18

## 2025-01-10 RX ADMIN — HEPARIN SODIUM 25 UNITS/KG/HR: 10000 INJECTION, SOLUTION INTRAVENOUS at 21:27

## 2025-01-10 RX ADMIN — NYSTATIN: 100000 POWDER TOPICAL at 10:11

## 2025-01-10 RX ADMIN — CALCIUM CHLORIDE, MAGNESIUM CHLORIDE, SODIUM CHLORIDE, SODIUM BICARBONATE, POTASSIUM CHLORIDE AND SODIUM PHOSPHATE DIBASIC DIHYDRATE 1500 ML/HR: 3.68; 3.05; 6.34; 3.09; .314; .187 INJECTION INTRAVENOUS at 00:53

## 2025-01-10 RX ADMIN — ANTICOAGULANT CITRATE DEXTROSE SOLUTION FORMULA A 2 ML: 12.25; 11; 3.65 SOLUTION INTRAVENOUS at 18:13

## 2025-01-10 RX ADMIN — FAMOTIDINE 20 MG: 10 INJECTION, SOLUTION INTRAVENOUS at 20:51

## 2025-01-10 NOTE — CASE MANAGEMENT/SOCIAL WORK
Continued Stay Note  [facility]     Patient Name: Natalia Arzate  MRN: [id number]  Today's Date: 1/10/2025    Admit Date: 1/2/2025    Plan: Ongoing   Discharge Plan       Row Name 01/10/25 1400       Plan    Plan Ongoing    Plan Comments Discussed patient in MDR.  Patient remains sedated and on mechanical ventilation.  Patient continues with CRRT.  Discharge plan is to be determined.  Case Management will continue to follow.                   Discharge Codes    No documentation.                       Kasie Sánchez RN

## 2025-01-10 NOTE — PROGRESS NOTES
Clinical Nutrition     Patient Name: Natalia Arzate  YOB: 1985  MRN: [id number]  Date of Encounter: 01/10/25 11:45 EST  Admission date: 2025  Reason for Visit: MDR, Follow-up protocol, EN    Assessment   Nutrition Assessment   Admission Diagnosis:  Acute respiratory failure [J96.00]    Problem List:    Acute respiratory failure with hypercapnia    MARIAA (acute kidney injury)    Type 2 diabetes mellitus with hyperglycemia    Sepsis      PMH:   She  has a past medical history of A-fib, Abnormal ECG, Anemia, Anxiety, Asthma, Cancer, Depression, Diabetes mellitus, DVT (deep venous thrombosis), Factor 5 Leiden mutation, heterozygous, Fibroid, GERD (gastroesophageal reflux disease), Gout, H/O abdominal abscess, History of sepsis, History of transfusion, Hyperlipidemia, Hypothyroid, Kidney stone, Migraines, Neuropathy, Ovarian cancer (2021), Ovarian cyst, PE (pulmonary embolism), Polycystic ovary syndrome, Preeclampsia, Rh incompatibility, Stroke, TIA (transient ischemic attack), Urinary tract infection, and Varicella.    PSH:  She  has a past surgical history that includes  section; Cholecystectomy; Colonoscopy; Cardiac catheterization; Right oophorectomy; Abdominal surgery; Esophagogastroduodenoscopy; ureteroscopy laser lithotripsy with stent insertion (Left, 10/01/2021); Extracorporeal shock wave lithotripsy (Left, 10/22/2021); Lithotripsy; ureteroscopy laser lithotripsy with stent insertion (Left, 2022); ureteroscopy laser lithotripsy with stent insertion (Left, 2022); Nephrectomy (Left, 10/2022); and Hysterectomy ().    Substance history: Opioids    Applicable Nutrition History:     ARF/VENT  25    s/p Bronch 25    MARIAA- CRRT 24    SKIN: L flank - ITD, coccyx- areas of scarring, sacrum small open area that is bleeding per WOC    Labs    Labs Reviewed: Yes    Results from last 7 days   Lab Units 01/10/25  0842 01/10/25  0003 25  1547  01/08/25  1631 01/08/25  0308 01/06/25  0319 01/05/25  0533 01/04/25  0434 01/04/25  0434 01/03/25  2035   GLUCOSE mg/dL 76 97 80   < > 214*   < > 107*   < > 107*  --    BUN mg/dL 16 21* 27*   < > 65*   < > 42*   < > 37*  --    CREATININE mg/dL 0.77 0.90 1.10*   < > 3.47*   < > 2.22*   < > 1.99*  --    SODIUM mmol/L 134* 138 139   < > 141   < > 143   < > 144  --    CHLORIDE mmol/L 97* 101 101   < > 103   < > 106   < > 110*  --    POTASSIUM mmol/L 4.1 4.6 4.3   < > 5.1   < > 3.9   < > 4.3  --    PHOSPHORUS mg/dL 5.1* 5.1* 5.1*   < > 6.5*   < >  --   --   --   --    MAGNESIUM mg/dL 2.3  2.3 2.3 2.3   < >  --    < >  --   --   --   --    ALT (SGPT) U/L 8  --   --   --  12  --  24   < > 34*  --    LACTATE mmol/L  --   --   --   --   --   --   --   --  0.7 1.0    < > = values in this interval not displayed.       Results from last 7 days   Lab Units 01/10/25  0842 01/10/25  0003 01/09/25  1547 01/05/25  0533 01/04/25 0434   ALBUMIN g/dL 3.2* 3.2* 3.1*   < > 3.0*   PREALBUMIN mg/dL  --   --   --   --  12.4*   CRP mg/dL 13.22*  --   --   --   --    IONIZED CALCIUM mmol/L 1.13* 1.12* 1.13*   < >  --    CHOLESTEROL mg/dL 114  --   --   --   --    TRIGLYCERIDES mg/dL 368*  --   --   --   --     < > = values in this interval not displayed.       Results from last 7 days   Lab Units 01/10/25  0502 01/09/25  2347 01/09/25  1945 01/09/25  1754 01/09/25  1615 01/09/25  1134   GLUCOSE mg/dL 91 97 96 90 82 129     Lab Results   Lab Value Date/Time    HGBA1C 9.10 (H) 01/01/2025 2133    HGBA1C 9.6 (H) 12/16/2024 1154    HGBA1C 9.9 (H) 10/15/2024 0505                   Medications    Medications Reviewed: yes    Scheduled Meds:ampicillin-sulbactam, 3 g, Intravenous, Q12H  bumetanide, 2 mg, Intravenous, Q12H  chlorhexidine, 15 mL, Mouth/Throat, Q12H  famotidine, 20 mg, Intravenous, BID  insulin glargine, 35 Units, Subcutaneous, Q12H  insulin regular, 3-14 Units, Subcutaneous, Q6H  methylnaltrexone, 12 mg, Subcutaneous, Every Other  Day  nystatin, , Topical, Q12H  senna-docusate sodium, 2 tablet, Nasogastric, BID   And  polyethylene glycol, 17 g, Nasogastric, Daily  vancomycin, 1,750 mg, Intravenous, Q24H      Continuous Infusions:fentanyl 10 mcg/mL,  mcg/hr, Last Rate: 75 mcg/hr (01/09/25 1900)  heparin, 25 Units/kg/hr, Last Rate: 25 Units/kg/hr (01/10/25 0647)  Midazolam 1 mg/mL 100mL NS, 1-10 mg/hr, Last Rate: Stopped (01/09/25 1500)  norepinephrine, 0.02-0.3 mcg/kg/min, Last Rate: Stopped (01/08/25 2156)  Pharmacy to Dose Heparin,   phenylephrine, 0.5-3 mcg/kg/min, Last Rate: Stopped (01/10/25 0040)  Phoxillum BK4/2.5, 1,500 mL/hr, Last Rate: 1,500 mL/hr (01/10/25 1101)  Phoxillum BK4/2.5, 1,500 mL/hr, Last Rate: 1,500 mL/hr (01/10/25 1100)  Phoxillum BK4/2.5, 1,500 mL/hr, Last Rate: 1,500 mL/hr (01/10/25 1100)      PRN Meds:.  acetaminophen    anticoagulant citrate (ACD) formula A    senna-docusate sodium **AND** polyethylene glycol **AND** [DISCONTINUED] bisacodyl **AND** bisacodyl    Calcium Replacement - Follow Nurse / BPA Driven Protocol    dextrose    dextrose    diphenhydrAMINE    EPINEPHrine    EPINEPHrine    glucagon (human recombinant)    ipratropium    ipratropium-albuterol    Magnesium Standard Dose Replacement - Follow Nurse / BPA Driven Protocol    methylPREDNISolone sodium succinate    Pharmacy to Dose Heparin    Phosphorus Replacement - Follow Nurse / BPA Driven Protocol    Polyvinyl Alcohol-Povidone PF    Potassium Replacement - Follow Nurse / BPA Driven Protocol    sodium chloride    Intake/Ouptut 24 hrs (0701 - 0700)   I&O's Reviewed: yes    Intake & Output (last day)         01/09 0701  01/10 0700 01/10 0701  01/11 0700    P.O. 0 0    I.V. (mL/kg) 2025.3 (12.1) 160.1 (1)    Other 100     NG/GT 50     IV Piggyback 288.4     Total Intake(mL/kg) 2463.7 (14.8) 160.1 (1)    Urine (mL/kg/hr) 935 (0.2) 60 (0.1)    Emesis/NG output      Dialysis 4972 568    Total Output 5907 628    Net -3443.3 -467.9                "  Anthropometrics     Height: Height: 162.6 cm (64.02\")64in  Last Filed Weight: Weight: (!) 167 kg (369 lb) (01/09/25 0530)350lb  Method: Weight Method: Bed scale (pt weighed with only 1 pillow on bed, also evans bag and bed air pressure machine removed, Pt weighs 380lbs with air pressure to mattress hanging on foot of bed.)est  BMI: BMI (Calculated): 63.360    UBW: last MD office weight 336lb    Weight change:  ? 27lb wt gain since January     Weight       Weight (kg) Weight (lbs) Weight Method Visit Report   5/24/2023 129.729 kg  286 lb  Stated  --     127.007 kg  280 lb   --    6/24/2023 127.007 kg  280 lb  Stated     7/6/2023 --  --   --    7/26/2023 129.275 kg  285 lb      7/27/2023 132.904 kg  293 lb   --    8/15/2023 124.739 kg  275 lb  Stated     8/17/2023 127.007 kg  280 lb  Stated     9/10/2023 129.275 kg  285 lb  Stated     10/6/2023 140.161 kg (H)  309 lb (H)   --    11/7/2023 133.811 kg  295 lb  Stated     11/20/2023 133.811 kg  295 lb      11/24/2023 133.358 kg  294 lb      11/26/2023 133.811 kg  295 lb  Stated     1/2/2024 140.161 kg (H)  309 lb (H)  Stated     2/13/2024 145.605 kg (H)  321 lb (H)  Stated     3/12/2024 --  --   --    4/16/2024 142.611 kg (H)  314 lb 6.4 oz (H)   --    4/18/2024 142.429 kg (H)  314 lb (H)  Stated     4/26/2024 138.801 kg (H)  306 lb (H)  Stated     5/1/2024 138.801 kg (H)  306 lb (H)   --    5/6/2024 146.512 kg (H)  323 lb (H)   --    5/13/2024 146.512 kg (H)  323 lb (H)  Stated     7/8/2024 142.883 kg (H)  315 lb (H)  Stated     7/31/2024 147.691 kg (H)  325 lb 9.6 oz (H)   --    9/22/2024 143.79 kg (H)  317 lb (H)  Stated     12/3/2024 153.588 kg (H)  338 lb 9.6 oz (H)   --    12/16/2024 153.316 kg (H)  338 lb (H)   --     152.409 kg (H)  336 lb (H)   --    1/1/2025 158.759 kg (H)  350 lb (H)  Estimated        Legend:  (H) High  Nutrition Focused Physical Exam     Date:     Unable to perform due to Pt unable to participate at time of visit     Subjective " "  Reported/Observed/Food/Nutrition Related History:     1-10: pt intubated sedated, remains on CRRT  + fentanyl, heparin    Per RN: pt has been off pressors since last night, is less puffy, able to tolerate turning, has not had a bm, had good bowel sound    Per MD: ok to resume TF    1-8: pt intubated, sedated + fentanyl, versed,levophed 0.06mcg, bicarb @75ml/hr    Per RN: pt had 800ml GRV last night TF on hold, is super acidotic, line placed for dialysis, plan to start CRRT     1-6: pt intubated, sedated, fiancee at bedside  + fentanyl, versed, insulin    Per RN:pt s/p emergent bronch Saturday,  trophic feed started yesterday    Per MD ok to advance TF    1-3: Pt intubated, sedated, fiancee at bedside  + insulin, fentanyl, versed, heparin, amio    Per RN: pt had wide complex rhythm last night, lokelma on hold as K+ wnl, 103 temp    Per MD ok to start TF,  place keofeed    Needs Assessment   Date: 1-3-25    Height used:Height: 162.6 cm (64.02\")64in  Weights used/ ABW: 336lb/ 153kg   IBW: 120lb/ 55kg    Estimated Calorie needs: ~1500kcal  Method:  22-25Kcals/KG IBW: 1210-1375kcal  Method:  MSJ ABW: 2195kcal  Method:  PSU ABW: 2745kcal    Estimated Protein needs: ~ 138g protein  Method: 2.5g protein per kg IBW: 138g protein  Method: 0.8-1g protein per kg ABW: 122-153g protein    Current Nutrition Prescription     PO: NPO Diet NPO Type: Tube Feeding  Oral Nutrition Supplement:  Intake: N/A    EN: Peptamen Intense VHP  Goal Rate: 75ml  Water Flushes: 25ml  Modular: None  Route: NG   (keofeed located in gastric antrum per KUB 1-3-25  Tube: Small bore    At goal over: 20Hrs/day     Rx will supply:   Goal Volume 1500  mL/day     Flush Volume 500 mL/day     Energy 1500 Kcal/day 100 % Est Need   Protein 138 g/day 100 % Est Need   Fiber 6 g/day     Water in  EN 1260 mL     Total Water 1760 mL     Meet DRI Yes        --------------------------------------------------------------------------  Product/Rate verified at bedside: " Yes  Infusing Rate at time of visit: off    Average Delivery from [facility] Day:   Volume 0 mL/day 0  % Goal Vol.   Flush Volume 50 mL/day     Energy  Kcal/day  % Est Need   Protein  g/day  % Est Need   Fiber  g/day     Water in  EN  mL     Total Water  mL     Meet DRI No           Assessment & Plan   Nutrition Diagnosis   Date: 1-3-25  Updated: 1-10-25  Problem Inadequate energy intake    Etiology ARF/VENT   Signs/Symptoms 0% goal volume EN   Status: Active TF held    Goal:   Nutrition to support treatment and Adjust EN      Nutrition Intervention      Follow treatment progress, Care plan reviewed    Suggest restart TF at low rate @25ml/hr, gradually advance TF as tolerated to goal rate @75ml/hr, free water @25ml/hr    TF at goal volume will provide 1500ml, 1500kcal,100% kcal needs, 138g protein, 100% protein needs, 38meq K+, 1020mg Phos, 6g fiber, 1760ml free water    Per last KUB: feeding tube is in stomach    If pt does not tolerate gastric feeding, may need to consult GI to place small bowel feeding tube as nursing has not been able to place tube lower 2nd pt's anatomy    Monitoring/Evaluation:   Per protocol, I&O, Pertinent labs, Weight, Skin status, GI status, Symptoms, Hemodynamic stability    Maria Del Carmen Matamoros, LOUIE  Time Spent: 30min

## 2025-01-10 NOTE — NURSING NOTE
CRRT rounds completed in ICU. Spoke with ICU RN Emelyn. All supplies at bedside;Reviewed documentation,  prescription and plan of care with RN.Catheter site and dressing checked and date to be changed reviewed.

## 2025-01-10 NOTE — PROGRESS NOTES
Daily Note     Today's date: 2020  Patient name: Michael Melvin  : 1951  MRN: [id number]  Referring provider: Kaylyn Mancilla MD  Dx:   Encounter Diagnosis     ICD-10-CM    1  Chronic pain of left ankle M25 572     G89 29                   Subjective: took a couple of days off from work and feels a bit better  Thinks she may have "overdone it" last session  Objective: See treatment diary below      Assessment: Tolerated treatment well  Patient demonstrated fatigue post treatment      Plan: Continue per plan of care        Precautions: none      Manuals 6/4 6/8 6/10 6/12 6/17 6/24 7/1 7/5     jt mobs michael rodriguez mb     PROM mb michael rodriguez mb     orthotics  mb           Retrograde massage for swelling     10'        Neuro Re-Ed             SLB  nv 3' 4' declined        Wobble board  nv 30 cw,ccw 30 cw, ccw 30 cw, ccw 30 ea 30 30                  Moisés stretch  IP  30"z2 30"x2 30"x2 30"x2 30"x2 30"x2     Sol stretch  IP  floor 30"x2 30"x2 30"x2 30"x2 30"x2 30"x2     stairs x2  x6   x1       ankle tband   4 ways  Home        Toe crunches             Leg press single heel raise    1 pl 3x10  1pl 30 single 30 dbl 1 pl single & dble 50 single x40     bike    10' 10' 10' 10' 10     Foam tandem       x10 x10     Ther Activity                                       Gait Training                                       Modalities Pharmacy to Dose Heparin Infusion Note  Natalia Arzate is a 39 y.o. female receiving heparin infusion.     Therapy for (VTE/Cardiac): VTE  Patient Weight: 159 kg  Initial Bolus (Y/N): No  Any Bolus (Y/N): Yes  Signs or Symptoms of Bleeding: None currently - has been held previously in admission for dropping H/H.     VTE (PE/DVT)  Initial rate: 18 units/kg/hr (Max 1,500 units/hr)    Anti-Xa Bolus   Dose Infusion Hold   Time Infusion Rate Change (units/kg/hr) Repeat  Anti-Xa   < 0.11 50 units/kg  (4000 units Max) None Increase by  4 units/kg/hr 6 hours   0.11 - 0.19 25 units/kg  (2000 units Max) None Increase by  3 units/kg/hr 6 hours   0.2 - 0.29 0 None Increase by  2 units/kg/hr 6 hours   0.3 - 0.7 0 None No Change 6 hours (after 2 consecutive levels in range check qAM)   0.71 - 0.8 0 None Decrease by  1 units/kg/hr 6 hours   0.81 - 0.9 0 None Decrease by  2 units/kg/hr 6 hours   0.91 - 1 0 60 minutes Decrease by  3 units/kg/hr 6 hours   >1 0 Hold  After Anti-Xa less than 0.7 decrease previous rate by  4 units/kg/hr  Every 2 hours until Anti-Xa less than 0.7 then when infusion restarts in 6 hours     Results from last 7 days   Lab Units 01/10/25  0515 01/09/25  0531 01/08/25  0308 01/06/25  1956 01/06/25  1319   INR   --   --   --   --  1.24*   HEMOGLOBIN g/dL 7.1* 7.2* 7.2*   < >  --    HEMATOCRIT % 24.7* 24.9* 25.9*   < >  --    PLATELETS 10*3/mm3 144 136* 177   < >  --     < > = values in this interval not displayed.       Date   Time   Anti-Xa Current Rate (units/kg/hr) Bolus   (units) Rate Change   (units/kg/hr) New Rate (units/kg/hr) Repeat  Anti-Xa Comments /  Pump Check    1/2 1500 pending -- -- +9 9 0000 D/w RN; will await labs, but expect this to be an aPTT assay    1/2 N/a N/a 9 -- -- 9 2100 Baseline anti-Xa 0.1, re-timed next check for 2100.    1/2 2021 0.1 9 4000 +4 13 0400 DW RN   1/3 0500 0.10 13 4000 +4 17 1200 D/w RN   1/3 1200 0.19 17 4000 +1 18 2000 D/w RN   1/3 2035 0.23 18 -- +2 20 0400  D/w RN   1/4 0545 0.23 18 -- +2 22 1200 D/w RN     1/4   0815   HOLD   22   HOLD   HOLD   HOLD   HOLD D/w ROB Kinsey & APRN; hold heparin drip for now 2/2 H&H dropping; no overt bleeding noted;   MAR placeholder entered.   1/6 1300 pending -- -- +9 9 2000 D/w Dr. De Jesus and Tio, RN. Restart heparin gtt. Will not give initial bolus given concern for dropping hgb. Ok for future boluses.     1/6 1956 0.10 9 4000 +4 13 0600 DW RN. Continue watching hgb, currently stable   1/7 0600 0.10 13 4000 +4 17 1200 D/w RN   1/7 1204 0.10 17 4000 +4 21 2000 D/w ROB [place]    1/7 2023 0.17 21 2000 +3 24 0600 D/w ROB Burns   1/8 0650 0.23 24 -- +1 26 1200 D/w RN   1/8 1250 0.31 26 -- -- 26 2000 D/w RN   1/8 2030 0.27 26 -- +2 28 0300 D/w ROB Burns   1/9 0300 0.59 28 -- -- 28 1000 D/w RN   1/9 0902 0.87 28 -- -2 26 1500 DW ROB Kinsey. H/H stable    1/9 1547 0.71 26 -- -1 25 2300 DW ROB Kinsey   1/10 0003 0.61 25 -- -- 25 0600 Carlos Alberto 2468 -acb   1/10 0515 0.62 25 -- -- 25 AM labs Carlos Alberto 2468 -acb                                                                     Thank you,  Panda Paz, PharmD   01/10/25  06:24 EST

## 2025-01-10 NOTE — PLAN OF CARE
Goal Outcome Evaluation:  Plan of Care Reviewed With: patient, parent        Progress: no change    Remains intubated, weaned off versed, on fentanyl @ 75, pt has strong cough and attempts to open eyes, on an off rico, hypothermic this am, eugene hugger and blood warmer used, normothermic, bowel regimen continues, relistor administered, digitally checked, no BM yet, no NGT output, UOP ~ 500 ml, CRRT continues, tolerating  ml, heparin gtt continues, BS this evening 82,  APRN notified, insulin adjusted, mother updated.

## 2025-01-10 NOTE — PROGRESS NOTES
" LOS: 8 days   Patient Care Team:  Bladimir Calle PA-C as PCP - General (Physician Assistant)    Chief Complaint: MARIAA   Hyperkalemia    Subjective   Tolerating CRRT well. UF ~ 150cc net negative. Labs reviewed ABG improved but mild met acidosis is persistent. Resp status improved. Generalize edema also better.     History taken from: family RN    Objective     Vital Sign Min/Max for last 24 hours  Temp  Min: 97.3 °F (36.3 °C)  Max: 98.1 °F (36.7 °C)   BP  Min: 99/61  Max: 139/77   Pulse  Min: 68  Max: 96   Resp  Min: 24  Max: 24   SpO2  Min: 92 %  Max: 100 %   No data recorded   No data recorded         I/O this shift:  In: 370.9 [I.V.:370.9]  Out: 1425 [Urine:60]  I/O last 3 completed shifts:  In: 4062.5 [I.V.:2884.9; Other:120; NG/GT:90; IV Piggyback:967.6]  Out: 8539 [Urine:2137; Emesis/NG output:300]    Objective    Gen: intubated and sedated.    HENT: NC, AT  NECK: Supple, ETT in place  LUNGS: Intubated on MV  CVS: S1/S2 audible, RRR, no murmur   Abd: Soft, NT, ND, BS+   Ext: +2 UE and LE edema, ( Improved)no cyanosis   CNS: Intubated and sedated.   Psy: Intubated and sedated.   Skin: Warm, dry and intact  : Urena cath +    Results Review:     I reviewed the patient's new clinical results.    WBC WBC   Date Value Ref Range Status   01/10/2025 4.99 3.40 - 10.80 10*3/mm3 Final   01/09/2025 5.46 3.40 - 10.80 10*3/mm3 Final   01/08/2025 8.99 3.40 - 10.80 10*3/mm3 Final      HGB Hemoglobin   Date Value Ref Range Status   01/10/2025 7.1 (L) 12.0 - 15.9 g/dL Final   01/09/2025 7.2 (L) 12.0 - 15.9 g/dL Final   01/08/2025 7.2 (L) 12.0 - 15.9 g/dL Final      HCT Hematocrit   Date Value Ref Range Status   01/10/2025 24.7 (L) 34.0 - 46.6 % Final   01/09/2025 24.9 (L) 34.0 - 46.6 % Final   01/08/2025 25.9 (L) 34.0 - 46.6 % Final      Platlets No results found for: \"LABPLAT\"   MCV MCV   Date Value Ref Range Status   01/10/2025 98.4 (H) 79.0 - 97.0 fL Final   01/09/2025 99.6 (H) 79.0 - 97.0 fL Final   01/08/2025 104.0 " (H) 79.0 - 97.0 fL Final          Sodium Sodium   Date Value Ref Range Status   01/10/2025 135 (L) 136 - 145 mmol/L Final   01/10/2025 134 (L) 136 - 145 mmol/L Final   01/10/2025 138 136 - 145 mmol/L Final   01/09/2025 139 136 - 145 mmol/L Final   01/09/2025 137 136 - 145 mmol/L Final   01/09/2025 137 136 - 145 mmol/L Final   01/08/2025 143 136 - 145 mmol/L Final   01/08/2025 142 136 - 145 mmol/L Final   01/08/2025 141 136 - 145 mmol/L Final      Potassium Potassium   Date Value Ref Range Status   01/10/2025 4.4 3.5 - 5.2 mmol/L Final     Comment:     Slight hemolysis detected by analyzer. Result may be falsely elevated.   01/10/2025 4.1 3.5 - 5.2 mmol/L Final   01/10/2025 4.6 3.5 - 5.2 mmol/L Final   01/09/2025 4.3 3.5 - 5.2 mmol/L Final   01/09/2025 4.6 3.5 - 5.2 mmol/L Final   01/09/2025 4.7 3.5 - 5.2 mmol/L Final   01/08/2025 5.0 3.5 - 5.2 mmol/L Final   01/08/2025 5.6 (H) 3.5 - 5.2 mmol/L Final   01/08/2025 5.1 3.5 - 5.2 mmol/L Final      Chloride Chloride   Date Value Ref Range Status   01/10/2025 99 98 - 107 mmol/L Final   01/10/2025 97 (L) 98 - 107 mmol/L Final   01/10/2025 101 98 - 107 mmol/L Final   01/09/2025 101 98 - 107 mmol/L Final   01/09/2025 100 98 - 107 mmol/L Final   01/09/2025 99 98 - 107 mmol/L Final   01/08/2025 106 98 - 107 mmol/L Final   01/08/2025 104 98 - 107 mmol/L Final   01/08/2025 103 98 - 107 mmol/L Final      CO2 CO2   Date Value Ref Range Status   01/10/2025 17.0 (L) 22.0 - 29.0 mmol/L Final   01/10/2025 20.0 (L) 22.0 - 29.0 mmol/L Final   01/10/2025 19.0 (L) 22.0 - 29.0 mmol/L Final   01/09/2025 19.0 (L) 22.0 - 29.0 mmol/L Final   01/09/2025 18.0 (L) 22.0 - 29.0 mmol/L Final   01/09/2025 20.0 (L) 22.0 - 29.0 mmol/L Final   01/08/2025 21.0 (L) 22.0 - 29.0 mmol/L Final   01/08/2025 19.0 (L) 22.0 - 29.0 mmol/L Final   01/08/2025 17.0 (L) 22.0 - 29.0 mmol/L Final      BUN BUN   Date Value Ref Range Status   01/10/2025 15 6 - 20 mg/dL Final   01/10/2025 16 6 - 20 mg/dL Final   01/10/2025  "21 (H) 6 - 20 mg/dL Final   01/09/2025 27 (H) 6 - 20 mg/dL Final   01/09/2025 34 (H) 6 - 20 mg/dL Final   01/09/2025 48 (H) 6 - 20 mg/dL Final   01/08/2025 53 (H) 6 - 20 mg/dL Final   01/08/2025 68 (H) 6 - 20 mg/dL Final   01/08/2025 65 (H) 6 - 20 mg/dL Final      Creatinine Creatinine   Date Value Ref Range Status   01/10/2025 0.84 0.57 - 1.00 mg/dL Final   01/10/2025 0.77 0.57 - 1.00 mg/dL Final   01/10/2025 0.90 0.57 - 1.00 mg/dL Final   01/09/2025 1.10 (H) 0.57 - 1.00 mg/dL Final   01/09/2025 1.55 (H) 0.57 - 1.00 mg/dL Final   01/09/2025 2.31 (H) 0.57 - 1.00 mg/dL Final   01/08/2025 2.58 (H) 0.57 - 1.00 mg/dL Final   01/08/2025 3.57 (H) 0.57 - 1.00 mg/dL Final   01/08/2025 3.47 (H) 0.57 - 1.00 mg/dL Final      Calcium Calcium   Date Value Ref Range Status   01/10/2025 8.9 8.6 - 10.5 mg/dL Final   01/10/2025 8.7 8.6 - 10.5 mg/dL Final   01/10/2025 8.9 8.6 - 10.5 mg/dL Final   01/09/2025 8.7 8.6 - 10.5 mg/dL Final   01/09/2025 8.7 8.6 - 10.5 mg/dL Final   01/09/2025 8.7 8.6 - 10.5 mg/dL Final   01/08/2025 8.3 (L) 8.6 - 10.5 mg/dL Final   01/08/2025 8.8 8.6 - 10.5 mg/dL Final   01/08/2025 8.6 8.6 - 10.5 mg/dL Final      PO4 No results found for: \"CAPO4\"   Albumin Albumin   Date Value Ref Range Status   01/10/2025 3.4 (L) 3.5 - 5.2 g/dL Final   01/10/2025 3.2 (L) 3.5 - 5.2 g/dL Final   01/10/2025 3.2 (L) 3.5 - 5.2 g/dL Final   01/09/2025 3.1 (L) 3.5 - 5.2 g/dL Final   01/09/2025 2.9 (L) 3.5 - 5.2 g/dL Final   01/09/2025 3.0 (L) 3.5 - 5.2 g/dL Final   01/08/2025 2.8 (L) 3.5 - 5.2 g/dL Final   01/08/2025 3.1 (L) 3.5 - 5.2 g/dL Final      Magnesium Magnesium   Date Value Ref Range Status   01/10/2025 2.4 1.6 - 2.6 mg/dL Final   01/10/2025 2.3 1.6 - 2.6 mg/dL Final   01/10/2025 2.3 1.6 - 2.6 mg/dL Final   01/10/2025 2.3 1.6 - 2.6 mg/dL Final   01/09/2025 2.3 1.6 - 2.6 mg/dL Final   01/09/2025 2.3 1.6 - 2.6 mg/dL Final   01/09/2025 2.2 1.6 - 2.6 mg/dL Final   01/08/2025 1.8 1.6 - 2.6 mg/dL Final   01/08/2025 1.9 1.6 - " "2.6 mg/dL Final      Uric Acid No results found for: \"URICACID\"         Results from last 7 days   Lab Units 01/10/25  1617 01/10/25  0842 01/10/25  0515 01/10/25  0003 01/09/25  1547 01/09/25  0902 01/09/25  0531 01/08/25  1631 01/08/25  0308 01/07/25  0433   SODIUM mmol/L 135* 134*  --  138 139   < >  --    < > 141 145   POTASSIUM mmol/L 4.4 4.1  --  4.6 4.3   < >  --    < > 5.1 4.2   CHLORIDE mmol/L 99 97*  --  101 101   < >  --    < > 103 108*   CO2 mmol/L 17.0* 20.0*  --  19.0* 19.0*   < >  --    < > 17.0* 19.0*   BUN mg/dL 15 16  --  21* 27*   < >  --    < > 65* 55*   CREATININE mg/dL 0.84 0.77  --  0.90 1.10*   < >  --    < > 3.47* 3.27*   CALCIUM mg/dL 8.9 8.7  --  8.9 8.7   < >  --    < > 8.6 8.4*   ALBUMIN g/dL 3.4* 3.2*  --  3.2* 3.1*   < >  --    < > 3.1*  --    WBC 10*3/mm3  --   --  4.99  --   --   --  5.46  --  8.99 8.54   HEMOGLOBIN g/dL  --   --  7.1*  --   --   --  7.2*  --  7.2* 7.7*   PLATELETS 10*3/mm3  --   --  144  --   --   --  136*  --  177 176    < > = values in this interval not displayed.       Intake/Output Summary (Last 24 hours) at 1/10/2025 1728  Last data filed at 1/10/2025 1406  Gross per 24 hour   Intake 1872.92 ml   Output 4842 ml   Net -2969.08 ml         Medication Review: Yes    Assessment & Plan       Acute respiratory failure with hypercapnia    MARIAA (acute kidney injury)    Type 2 diabetes mellitus with hyperglycemia    Sepsis      Assessment & Plan:    1-MARIAA- non oliguric - Pre-renal azotemia vs sepsis related MARIAA. UA - RBC 3-5, + protein. 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 At home on lisinopril, metformin and topiramate. Initially was improving with IV hydration and hemodynamic support but later developed worsening resp status and volume overload. Prompting CRRT ~ 1/8/25   2- Hyperkalemia - Management with CRRT  3- Hyperglycemia- On admission. Improved.   4- Anemia- Transfuse at hB<7  5- Shock- ff pressor. Hemodynamics improved after aggressive UF with " CRRT  6- hx of Factor V Leiden deficiency   7- hx of DM   8- Respiratory distress - suspicion of aspiration pneumonia. Intubated on MV.   9- Hx of Gout   10. Nutrition: On high protein feed discussed with dietician overall protein intake is still <1g/kg/day     Plan:  - Ok to stop CRRT at shift change. Plan for iHD  in AM. Patient is making some urine therefore will continue with diuretics.   - Bumex 3 mg tonight to promote more UOP.  - Monitor H/H and transfuse for Hgb less than 7.0   - Dose meds to eGFR<35ml/min while on CRRT.   - Maintain MAP 65 or above.     Discussed with RN and Critical care team  Stanley Velasco MD  01/10/25  17:28 EST

## 2025-01-10 NOTE — PLAN OF CARE
Goal Outcome Evaluation:                 CRRT discontinued   PEEP weaned down to 10   Pt more reactive but not following commands   Mother updated by phone     Tube feeding restarted   Will attempt HD tomorrow

## 2025-01-10 NOTE — PLAN OF CARE
Goal Outcome Evaluation:               Pt is receiving CRRT. Unable to wean ventilator support at this time.

## 2025-01-10 NOTE — PLAN OF CARE
Goal Outcome Evaluation:  Plan of Care Reviewed With: patient        Progress: improving  Outcome Evaluation: CRRT continues, currently with Ultrafiltration of 150mL, tolerating very well.  Phenylephrine weaned to off this shift, has tolerated this for the previous four hours without issue.  FiO2 has been weaned to 35%.  This AM after ABG, PEEP being weaned to 12.  Pt has been afebrile throughout the shift.  Pt continues to generate urine, though not an adequate amount.  Fentanyl continues at 75 mcg/hr, Heparin remains therapeutic at 25 mcg/kg/hr.

## 2025-01-10 NOTE — PLAN OF CARE
Goal Outcome Evaluation: Patient unable to wean off ventilator at this time.

## 2025-01-10 NOTE — PROGRESS NOTES
Intensive Care Follow-up     Hospital:  LOS: 8 days   Ms. Natalia Arzate, 39 y.o. female is followed for:   Acute respiratory failure with hypercapnia        Subjective     Ms. Arzate is a 40yo F with a history of HLD, Hypothyroidism, Afib, PE/DVT, Factor V Leiden mutation, and stroke who presented to [facility] with altered mental status. Labs there were significant for renal failure, hyperglycemia, and hypercapnic respiratory failure. Imaging consistent with basilar pulmonary infiltrates concerning for pneumonia. She was intubated and required the initiation of vasopressors. She was transferred to [facility] on 1/2/25 for ongoing care.      Since arrival, she has continued to require mechanical ventilation along with vasopressors. She has been started on Vancomycin, Flagyl and Cefepime. Cultures are significant for MRSA in the sputum along with gemella sanguinis in the blood.      Nephrology has been following for MARIAA with poor urine output and hyperkalemia.      On the morning of 1/4/25, she developed worsening hypoxia despite an FiO2 of 100% and PEEP of 16. Imaging showed white out of the left lung. Bronchoscopy was performed with mucopurulent secretions suctioned from the left upper and left lower lobes. Repeat CXR after bronchoscopy showed some improvement in left upper lobe aeration with persistent left lower lobe disease vs pleural effusion  Interval History:  The chart has been reviewed.  The patient has remained afebrile overnight.  She has not required pressors.  She is tolerating heparin infusion.  Currently at 40% FiO2 with a PEEP of 12.  No BM as yet though she does have bowel sounds.  Currently tolerating CRRT.    The patient's past medical, surgical and social history were reviewed and updated in Epic as appropriate.        Objective     Infusions:  fentanyl 10 mcg/mL,  mcg/hr, Last Rate: 75 mcg/hr (01/09/25 1900)  heparin, 25 Units/kg/hr, Last Rate: 25 Units/kg/hr (01/10/25 1406)  Midazolam 1 mg/mL  "100mL NS, 1-10 mg/hr, Last Rate: Stopped (01/09/25 1500)  norepinephrine, 0.02-0.3 mcg/kg/min, Last Rate: Stopped (01/08/25 2156)  Pharmacy to Dose Heparin,   phenylephrine, 0.5-3 mcg/kg/min, Last Rate: Stopped (01/10/25 0040)  Phoxillum BK4/2.5, 1,000 mL/hr, Last Rate: 1,000 mL/hr (01/10/25 1310)  Phoxillum BK4/2.5, 1,500 mL/hr, Last Rate: 1,500 mL/hr (01/10/25 1100)  Phoxillum BK4/2.5, 1,000 mL/hr, Last Rate: 1,000 mL/hr (01/10/25 1310)      Medications:  ampicillin-sulbactam, 3 g, Intravenous, Q12H  bumetanide, 2 mg, Intravenous, Q12H  chlorhexidine, 15 mL, Mouth/Throat, Q12H  famotidine, 20 mg, Intravenous, BID  insulin glargine, 35 Units, Subcutaneous, Q12H  insulin regular, 3-14 Units, Subcutaneous, Q6H  methylnaltrexone, 12 mg, Subcutaneous, Every Other Day  nystatin, , Topical, Q12H  senna-docusate sodium, 2 tablet, Nasogastric, BID   And  polyethylene glycol, 17 g, Nasogastric, Daily  vancomycin, 1,750 mg, Intravenous, Q24H        Vital Sign Min/Max for last 24 hours  Temp  Min: 97.3 °F (36.3 °C)  Max: 97.7 °F (36.5 °C)   BP  Min: 94/52  Max: 140/77   Pulse  Min: 68  Max: 90   Resp  Min: 24  Max: 24   SpO2  Min: 93 %  Max: 100 %   No data recorded       Input/Output for last 24 hour shift  01/09 0701 - 01/10 0700  In: 2463.7 [I.V.:2025.3]  Out: 5907 [Urine:935]   Mode: VC+/AC  FiO2 (%):  [35 %-45 %] 35 %  S RR:  [24] 24  S VT:  [380 mL] 380 mL  PEEP/CPAP (cm H2O):  [12 cm H20-14 cm H20] 12 cm H20  MAP (cm H2O):  [16-19] 16  Objective:  General Appearance:  Uncomfortable, ill-appearing and in no acute distress (Sedated).    Vital signs: (most recent): Blood pressure 100/55, pulse 89, temperature 97.5 °F (36.4 °C), temperature source Oral, resp. rate 24, height 162.6 cm (64.02\"), weight (!) 167 kg (369 lb), SpO2 93%, not currently breastfeeding.    HEENT: (Endotracheal tube in place.)    Lungs:  Normal effort.  There are decreased breath sounds.  No rales, wheezes or rhonchi.    Heart: Normal rate.  Regular " rhythm.  S1 normal and S2 normal.  No murmur.   Chest: Symmetric chest wall expansion.   Abdomen: Abdomen is soft and non-distended.  Bowel sounds are normal.   There is no abdominal tenderness.   There is no mass.   Extremities: There is dependent edema.    Neurological: (Sedated but arousable.  Not following).    Pupils:  Pupils are equal, round, and reactive to light.  Pupils are equal.   Skin:  Warm.                Results from last 7 days   Lab Units 01/10/25  0515 01/09/25  0531 01/08/25  0308   WBC 10*3/mm3 4.99 5.46 8.99   HEMOGLOBIN g/dL 7.1* 7.2* 7.2*   PLATELETS 10*3/mm3 144 136* 177     Results from last 7 days   Lab Units 01/10/25  0842 01/10/25  0003 01/09/25  1547   SODIUM mmol/L 134* 138 139   POTASSIUM mmol/L 4.1 4.6 4.3   CO2 mmol/L 20.0* 19.0* 19.0*   BUN mg/dL 16 21* 27*   CREATININE mg/dL 0.77 0.90 1.10*   MAGNESIUM mg/dL 2.3  2.3 2.3 2.3   PHOSPHORUS mg/dL 5.1* 5.1* 5.1*   GLUCOSE mg/dL 76 97 80     Estimated Creatinine Clearance: 155.8 mL/min (by C-G formula based on SCr of 0.77 mg/dL).    Results from last 7 days   Lab Units 01/10/25  0417   PH, ARTERIAL pH units 7.229*   PCO2, ARTERIAL mm Hg 46.1*   PO2 ART mm Hg 88.5           I reviewed the patient's results and images.     Assessment & Plan   Impression        Acute respiratory failure with hypercapnia    MARIAA (acute kidney injury)    Type 2 diabetes mellitus with hyperglycemia    Sepsis       Plan        We will continue on with renal replacement therapy.  Remains to be seen if she will tolerate intermittent hemodialysis.  We will continue to lessen ventilatory support as tolerated.  I would like to work on the PEEP and we will try to drop this from 12 down to 10 to see how she tolerates it.  Hopefully we can rapidly drop this over the next 24 hours.  Continue with bowel regimen as before.  We will try to restart tube feedings today.  Continue with glucose control as able.  Overall she is making progress however remains with both renal  and respiratory failure with ventilator and dialysis dependence.    Plan of care and goals reviewed with mulitdisciplinary/antibiotic stewardship team during rounds.   I discussed the patient's findings and my recommendations with nursing staff     High level of risk due to:  drug(s) requiring intensive monitoring for toxicity and parenteral controlled substances.        Saji De Jesus MD, [facility]  Pulmonology and Critical Care Medicine

## 2025-01-11 ENCOUNTER — APPOINTMENT (OUTPATIENT)
Dept: NEPHROLOGY | Facility: HOSPITAL | Age: 40
DRG: 870 | End: 2025-01-11
Payer: COMMERCIAL

## 2025-01-11 ENCOUNTER — APPOINTMENT (OUTPATIENT)
Dept: GENERAL RADIOLOGY | Facility: HOSPITAL | Age: 40
DRG: 870 | End: 2025-01-11
Payer: COMMERCIAL

## 2025-01-11 LAB
ABO GROUP BLD: NORMAL
ALBUMIN SERPL-MCNC: 3.2 G/DL (ref 3.5–5.2)
ANION GAP SERPL CALCULATED.3IONS-SCNC: 25 MMOL/L (ref 5–15)
ANION GAP SERPL CALCULATED.3IONS-SCNC: 31 MMOL/L (ref 5–15)
B-OH-BUTYR SERPL-SCNC: 5.59 MMOL/L (ref 0.02–0.27)
BASOPHILS # BLD AUTO: 0.03 10*3/MM3 (ref 0–0.2)
BASOPHILS NFR BLD AUTO: 0.6 % (ref 0–1.5)
BLD GP AB SCN SERPL QL: NEGATIVE
BUN SERPL-MCNC: 24 MG/DL (ref 6–20)
BUN SERPL-MCNC: 26 MG/DL (ref 6–20)
BUN/CREAT SERPL: 16.2 (ref 7–25)
BUN/CREAT SERPL: 19.3 (ref 7–25)
CA-I SERPL ISE-MCNC: 1.19 MMOL/L (ref 1.15–1.3)
CALCIUM SPEC-SCNC: 9.2 MG/DL (ref 8.6–10.5)
CALCIUM SPEC-SCNC: 9.7 MG/DL (ref 8.6–10.5)
CHLORIDE SERPL-SCNC: 96 MMOL/L (ref 98–107)
CHLORIDE SERPL-SCNC: 99 MMOL/L (ref 98–107)
CO2 SERPL-SCNC: 11 MMOL/L (ref 22–29)
CO2 SERPL-SCNC: 13 MMOL/L (ref 22–29)
CREAT SERPL-MCNC: 1.35 MG/DL (ref 0.57–1)
CREAT SERPL-MCNC: 1.48 MG/DL (ref 0.57–1)
D-LACTATE SERPL-SCNC: 0.5 MMOL/L (ref 0.5–2)
DEPRECATED RDW RBC AUTO: 63.4 FL (ref 37–54)
EGFRCR SERPLBLD CKD-EPI 2021: 46.3 ML/MIN/1.73
EGFRCR SERPLBLD CKD-EPI 2021: 51.7 ML/MIN/1.73
EOSINOPHIL # BLD AUTO: 0.17 10*3/MM3 (ref 0–0.4)
EOSINOPHIL NFR BLD AUTO: 3.2 % (ref 0.3–6.2)
ERYTHROCYTE [DISTWIDTH] IN BLOOD BY AUTOMATED COUNT: 17.3 % (ref 12.3–15.4)
GLUCOSE BLDC GLUCOMTR-MCNC: 145 MG/DL (ref 70–130)
GLUCOSE BLDC GLUCOMTR-MCNC: 188 MG/DL (ref 70–130)
GLUCOSE BLDC GLUCOMTR-MCNC: 194 MG/DL (ref 70–130)
GLUCOSE BLDC GLUCOMTR-MCNC: 251 MG/DL (ref 70–130)
GLUCOSE SERPL-MCNC: 143 MG/DL (ref 65–99)
GLUCOSE SERPL-MCNC: 210 MG/DL (ref 65–99)
HBV SURFACE AG SERPL QL IA: NORMAL
HCT VFR BLD AUTO: 23.7 % (ref 34–46.6)
HCT VFR BLD AUTO: 26.6 % (ref 34–46.6)
HGB BLD-MCNC: 6.9 G/DL (ref 12–15.9)
HGB BLD-MCNC: 7.9 G/DL (ref 12–15.9)
IMM GRANULOCYTES # BLD AUTO: 0.26 10*3/MM3 (ref 0–0.05)
IMM GRANULOCYTES NFR BLD AUTO: 4.9 % (ref 0–0.5)
LYMPHOCYTES # BLD AUTO: 0.92 10*3/MM3 (ref 0.7–3.1)
LYMPHOCYTES NFR BLD AUTO: 17.2 % (ref 19.6–45.3)
MAGNESIUM SERPL-MCNC: 2.2 MG/DL (ref 1.6–2.6)
MCH RBC QN AUTO: 29.4 PG (ref 26.6–33)
MCHC RBC AUTO-ENTMCNC: 29.1 G/DL (ref 31.5–35.7)
MCV RBC AUTO: 100.9 FL (ref 79–97)
MONOCYTES # BLD AUTO: 0.43 10*3/MM3 (ref 0.1–0.9)
MONOCYTES NFR BLD AUTO: 8 % (ref 5–12)
NEUTROPHILS NFR BLD AUTO: 3.54 10*3/MM3 (ref 1.7–7)
NEUTROPHILS NFR BLD AUTO: 66.1 % (ref 42.7–76)
NRBC BLD AUTO-RTO: 0.4 /100 WBC (ref 0–0.2)
PHOSPHATE SERPL-MCNC: 6.4 MG/DL (ref 2.5–4.5)
PLATELET # BLD AUTO: 154 10*3/MM3 (ref 140–450)
PMV BLD AUTO: 10.1 FL (ref 6–12)
POTASSIUM SERPL-SCNC: 4.6 MMOL/L (ref 3.5–5.2)
POTASSIUM SERPL-SCNC: 4.7 MMOL/L (ref 3.5–5.2)
QT INTERVAL: 384 MS
QTC INTERVAL: 480 MS
RBC # BLD AUTO: 2.35 10*6/MM3 (ref 3.77–5.28)
RH BLD: NEGATIVE
SODIUM SERPL-SCNC: 137 MMOL/L (ref 136–145)
SODIUM SERPL-SCNC: 138 MMOL/L (ref 136–145)
T&S EXPIRATION DATE: NORMAL
UFH PPP CHRO-ACNC: 0.4 IU/ML (ref 0.3–0.7)
VANCOMYCIN TROUGH SERPL-MCNC: 22.4 MCG/ML (ref 5–20)
WBC NRBC COR # BLD AUTO: 5.35 10*3/MM3 (ref 3.4–10.8)

## 2025-01-11 PROCEDURE — 82948 REAGENT STRIP/BLOOD GLUCOSE: CPT

## 2025-01-11 PROCEDURE — 86901 BLOOD TYPING SEROLOGIC RH(D): CPT

## 2025-01-11 PROCEDURE — 86923 COMPATIBILITY TEST ELECTRIC: CPT

## 2025-01-11 PROCEDURE — 25010000002 HEPARIN (PORCINE) PER 1000 UNITS: Performed by: INTERNAL MEDICINE

## 2025-01-11 PROCEDURE — 83605 ASSAY OF LACTIC ACID: CPT | Performed by: INTERNAL MEDICINE

## 2025-01-11 PROCEDURE — 80202 ASSAY OF VANCOMYCIN: CPT

## 2025-01-11 PROCEDURE — 87340 HEPATITIS B SURFACE AG IA: CPT | Performed by: INTERNAL MEDICINE

## 2025-01-11 PROCEDURE — 99233 SBSQ HOSP IP/OBS HIGH 50: CPT | Performed by: INTERNAL MEDICINE

## 2025-01-11 PROCEDURE — 25010000002 FENTANYL 10 MCG/1 ML NS: Performed by: INTERNAL MEDICINE

## 2025-01-11 PROCEDURE — 85520 HEPARIN ASSAY: CPT

## 2025-01-11 PROCEDURE — 80069 RENAL FUNCTION PANEL: CPT | Performed by: INTERNAL MEDICINE

## 2025-01-11 PROCEDURE — 25010000002 AMPICILLIN-SULBACTAM PER 1.5 G: Performed by: INTERNAL MEDICINE

## 2025-01-11 PROCEDURE — 86850 RBC ANTIBODY SCREEN: CPT

## 2025-01-11 PROCEDURE — 86900 BLOOD TYPING SEROLOGIC ABO: CPT

## 2025-01-11 PROCEDURE — 71045 X-RAY EXAM CHEST 1 VIEW: CPT

## 2025-01-11 PROCEDURE — 80048 BASIC METABOLIC PNL TOTAL CA: CPT | Performed by: INTERNAL MEDICINE

## 2025-01-11 PROCEDURE — 85014 HEMATOCRIT: CPT | Performed by: INTERNAL MEDICINE

## 2025-01-11 PROCEDURE — 94799 UNLISTED PULMONARY SVC/PX: CPT

## 2025-01-11 PROCEDURE — 25010000002 BUMETANIDE PER 0.5 MG: Performed by: INTERNAL MEDICINE

## 2025-01-11 PROCEDURE — 25010000002 HEPARIN (PORCINE) 25000-0.45 UT/250ML-% SOLUTION

## 2025-01-11 PROCEDURE — 83735 ASSAY OF MAGNESIUM: CPT | Performed by: INTERNAL MEDICINE

## 2025-01-11 PROCEDURE — P9016 RBC LEUKOCYTES REDUCED: HCPCS

## 2025-01-11 PROCEDURE — 63710000001 INSULIN GLARGINE PER 5 UNITS: Performed by: INTERNAL MEDICINE

## 2025-01-11 PROCEDURE — 85025 COMPLETE CBC W/AUTO DIFF WBC: CPT | Performed by: INTERNAL MEDICINE

## 2025-01-11 PROCEDURE — 25010000002 METHYLNALTREXONE 12 MG/0.6ML SOLUTION: Performed by: INTERNAL MEDICINE

## 2025-01-11 PROCEDURE — 85018 HEMOGLOBIN: CPT | Performed by: INTERNAL MEDICINE

## 2025-01-11 PROCEDURE — 63710000001 INSULIN REGULAR HUMAN PER 5 UNITS

## 2025-01-11 PROCEDURE — 82330 ASSAY OF CALCIUM: CPT | Performed by: INTERNAL MEDICINE

## 2025-01-11 PROCEDURE — 82010 KETONE BODYS QUAN: CPT | Performed by: INTERNAL MEDICINE

## 2025-01-11 RX ORDER — LACTULOSE 10 G/15ML
10 SOLUTION ORAL ONCE
Status: COMPLETED | OUTPATIENT
Start: 2025-01-11 | End: 2025-01-11

## 2025-01-11 RX ORDER — DEXMEDETOMIDINE HYDROCHLORIDE 4 UG/ML
.2-1.5 INJECTION, SOLUTION INTRAVENOUS
Status: DISPENSED | OUTPATIENT
Start: 2025-01-11 | End: 2025-01-13

## 2025-01-11 RX ORDER — ALBUMIN (HUMAN) 12.5 G/50ML
25 SOLUTION INTRAVENOUS
Status: DISPENSED | OUTPATIENT
Start: 2025-01-11 | End: 2025-01-11

## 2025-01-11 RX ORDER — BUMETANIDE 0.25 MG/ML
2 INJECTION, SOLUTION INTRAMUSCULAR; INTRAVENOUS EVERY 12 HOURS
Status: DISCONTINUED | OUTPATIENT
Start: 2025-01-11 | End: 2025-01-14

## 2025-01-11 RX ORDER — HEPARIN SODIUM 1000 [USP'U]/ML
1000 INJECTION, SOLUTION INTRAVENOUS; SUBCUTANEOUS ONCE
Status: COMPLETED | OUTPATIENT
Start: 2025-01-11 | End: 2025-01-11

## 2025-01-11 RX ADMIN — INSULIN GLARGINE 35 UNITS: 100 INJECTION, SOLUTION SUBCUTANEOUS at 08:36

## 2025-01-11 RX ADMIN — INSULIN HUMAN 3 UNITS: 100 INJECTION, SOLUTION PARENTERAL at 11:49

## 2025-01-11 RX ADMIN — SENNOSIDES AND DOCUSATE SODIUM 2 TABLET: 50; 8.6 TABLET ORAL at 20:11

## 2025-01-11 RX ADMIN — AMPICILLIN SODIUM, SULBACTAM SODIUM 3 G: 2; 1 INJECTION, POWDER, FOR SOLUTION INTRAMUSCULAR; INTRAVENOUS at 02:43

## 2025-01-11 RX ADMIN — INSULIN HUMAN 3 UNITS: 100 INJECTION, SOLUTION PARENTERAL at 17:27

## 2025-01-11 RX ADMIN — SENNOSIDES AND DOCUSATE SODIUM 2 TABLET: 50; 8.6 TABLET ORAL at 08:37

## 2025-01-11 RX ADMIN — HEPARIN SODIUM 25 UNITS/KG/HR: 10000 INJECTION, SOLUTION INTRAVENOUS at 09:12

## 2025-01-11 RX ADMIN — LACTULOSE 10 G: 20 SOLUTION ORAL at 17:26

## 2025-01-11 RX ADMIN — HEPARIN SODIUM 25 UNITS/KG/HR: 10000 INJECTION, SOLUTION INTRAVENOUS at 04:06

## 2025-01-11 RX ADMIN — CHLORHEXIDINE GLUCONATE 0.12% ORAL RINSE 15 ML: 1.2 LIQUID ORAL at 08:37

## 2025-01-11 RX ADMIN — Medication 75 MCG/HR: at 02:44

## 2025-01-11 RX ADMIN — BUMETANIDE 2 MG: 0.25 INJECTION INTRAMUSCULAR; INTRAVENOUS at 17:27

## 2025-01-11 RX ADMIN — FAMOTIDINE 20 MG: 10 INJECTION, SOLUTION INTRAVENOUS at 20:11

## 2025-01-11 RX ADMIN — BUMETANIDE 3 MG: 0.25 INJECTION INTRAMUSCULAR; INTRAVENOUS at 05:07

## 2025-01-11 RX ADMIN — FAMOTIDINE 20 MG: 10 INJECTION, SOLUTION INTRAVENOUS at 08:37

## 2025-01-11 RX ADMIN — CHLORHEXIDINE GLUCONATE 0.12% ORAL RINSE 15 ML: 1.2 LIQUID ORAL at 20:11

## 2025-01-11 RX ADMIN — POLYETHYLENE GLYCOL 3350 17 G: 17 POWDER, FOR SOLUTION ORAL at 08:37

## 2025-01-11 RX ADMIN — DEXMEDETOMIDINE HYDROCHLORIDE 0.2 MCG/KG/HR: 400 INJECTION INTRAVENOUS at 17:28

## 2025-01-11 RX ADMIN — METHYLNALTREXONE BROMIDE 12 MG: 12 INJECTION, SOLUTION SUBCUTANEOUS at 08:37

## 2025-01-11 RX ADMIN — HEPARIN SODIUM 25 UNITS/KG/HR: 10000 INJECTION, SOLUTION INTRAVENOUS at 17:27

## 2025-01-11 RX ADMIN — NYSTATIN: 100000 POWDER TOPICAL at 20:12

## 2025-01-11 RX ADMIN — AMPICILLIN SODIUM, SULBACTAM SODIUM 3 G: 2; 1 INJECTION, POWDER, FOR SOLUTION INTRAMUSCULAR; INTRAVENOUS at 14:16

## 2025-01-11 RX ADMIN — DEXMEDETOMIDINE HYDROCHLORIDE 0.6 MCG/KG/HR: 400 INJECTION INTRAVENOUS at 21:36

## 2025-01-11 RX ADMIN — NYSTATIN: 100000 POWDER TOPICAL at 08:37

## 2025-01-11 RX ADMIN — HEPARIN SODIUM 1000 UNITS: 1000 INJECTION INTRAVENOUS; SUBCUTANEOUS at 18:52

## 2025-01-11 NOTE — PROGRESS NOTES
" LOS: 9 days   Patient Care Team:  Bladimir Calle PA-C as PCP - General (Physician Assistant)    Chief Complaint: MARIAA   Hyperkalemia    Subjective   Off CRRT. Excellent UOP however she has worsening of AG met acidosis. Bicarb ~ 11 AG ~ 31. Will check ketones and lactic acid. Plan for HD today for correction of AG acidosis.     History taken from: family RN    Objective     Vital Sign Min/Max for last 24 hours  Temp  Min: 97.7 °F (36.5 °C)  Max: 99.2 °F (37.3 °C)   BP  Min: 95/47  Max: 137/73   Pulse  Min: 87  Max: 119   Resp  Min: 24  Max: 34   SpO2  Min: 91 %  Max: 95 %   No data recorded   No data recorded         I/O this shift:  In: 704 [I.V.:160; Blood:324; Other:75; NG/GT:145]  Out: 1150 [Urine:1150]  I/O last 3 completed shifts:  In: 3552.1 [I.V.:2352.7; Other:292; NG/GT:411; IV Piggyback:496.4]  Out: 5873 [Urine:1030]    Objective    Gen: intubated and sedated.    HENT: NC, AT  NECK: Supple, ETT in place  LUNGS: Intubated on MV  CVS: S1/S2 audible, RRR, no murmur   Abd: Soft, NT, ND, BS+   Ext: +2 UE and LE edema, ( Improved)no cyanosis   CNS: Intubated and sedated.   Psy: Intubated and sedated.   Skin: Warm, dry and intact  : Urena cath +    Results Review:     I reviewed the patient's new clinical results.    WBC WBC   Date Value Ref Range Status   01/11/2025 5.35 3.40 - 10.80 10*3/mm3 Final   01/10/2025 4.99 3.40 - 10.80 10*3/mm3 Final   01/09/2025 5.46 3.40 - 10.80 10*3/mm3 Final      HGB Hemoglobin   Date Value Ref Range Status   01/11/2025 7.9 (L) 12.0 - 15.9 g/dL Final   01/11/2025 6.9 (C) 12.0 - 15.9 g/dL Final   01/10/2025 7.1 (L) 12.0 - 15.9 g/dL Final   01/09/2025 7.2 (L) 12.0 - 15.9 g/dL Final      HCT Hematocrit   Date Value Ref Range Status   01/11/2025 26.6 (L) 34.0 - 46.6 % Final   01/11/2025 23.7 (L) 34.0 - 46.6 % Final   01/10/2025 24.7 (L) 34.0 - 46.6 % Final   01/09/2025 24.9 (L) 34.0 - 46.6 % Final      Platlets No results found for: \"LABPLAT\"   MCV MCV   Date Value Ref Range " Status   01/11/2025 100.9 (H) 79.0 - 97.0 fL Final   01/10/2025 98.4 (H) 79.0 - 97.0 fL Final   01/09/2025 99.6 (H) 79.0 - 97.0 fL Final          Sodium Sodium   Date Value Ref Range Status   01/11/2025 138 136 - 145 mmol/L Final   01/11/2025 137 136 - 145 mmol/L Final   01/10/2025 132 (L) 136 - 145 mmol/L Final   01/10/2025 135 (L) 136 - 145 mmol/L Final   01/10/2025 134 (L) 136 - 145 mmol/L Final   01/10/2025 138 136 - 145 mmol/L Final   01/09/2025 139 136 - 145 mmol/L Final   01/09/2025 137 136 - 145 mmol/L Final   01/09/2025 137 136 - 145 mmol/L Final   01/08/2025 143 136 - 145 mmol/L Final   01/08/2025 142 136 - 145 mmol/L Final      Potassium Potassium   Date Value Ref Range Status   01/11/2025 4.7 3.5 - 5.2 mmol/L Final   01/11/2025 4.6 3.5 - 5.2 mmol/L Final     Comment:     Slight hemolysis detected by analyzer. Result may be falsely elevated.   01/10/2025 4.3 3.5 - 5.2 mmol/L Final   01/10/2025 4.4 3.5 - 5.2 mmol/L Final     Comment:     Slight hemolysis detected by analyzer. Result may be falsely elevated.   01/10/2025 4.1 3.5 - 5.2 mmol/L Final   01/10/2025 4.6 3.5 - 5.2 mmol/L Final   01/09/2025 4.3 3.5 - 5.2 mmol/L Final   01/09/2025 4.6 3.5 - 5.2 mmol/L Final   01/09/2025 4.7 3.5 - 5.2 mmol/L Final   01/08/2025 5.0 3.5 - 5.2 mmol/L Final   01/08/2025 5.6 (H) 3.5 - 5.2 mmol/L Final      Chloride Chloride   Date Value Ref Range Status   01/11/2025 96 (L) 98 - 107 mmol/L Final   01/11/2025 99 98 - 107 mmol/L Final   01/10/2025 95 (L) 98 - 107 mmol/L Final   01/10/2025 99 98 - 107 mmol/L Final   01/10/2025 97 (L) 98 - 107 mmol/L Final   01/10/2025 101 98 - 107 mmol/L Final   01/09/2025 101 98 - 107 mmol/L Final   01/09/2025 100 98 - 107 mmol/L Final   01/09/2025 99 98 - 107 mmol/L Final   01/08/2025 106 98 - 107 mmol/L Final   01/08/2025 104 98 - 107 mmol/L Final      CO2 CO2   Date Value Ref Range Status   01/11/2025 11.0 (L) 22.0 - 29.0 mmol/L Final   01/11/2025 13.0 (L) 22.0 - 29.0 mmol/L Final    01/10/2025 14.0 (L) 22.0 - 29.0 mmol/L Final   01/10/2025 17.0 (L) 22.0 - 29.0 mmol/L Final   01/10/2025 20.0 (L) 22.0 - 29.0 mmol/L Final   01/10/2025 19.0 (L) 22.0 - 29.0 mmol/L Final   01/09/2025 19.0 (L) 22.0 - 29.0 mmol/L Final   01/09/2025 18.0 (L) 22.0 - 29.0 mmol/L Final   01/09/2025 20.0 (L) 22.0 - 29.0 mmol/L Final   01/08/2025 21.0 (L) 22.0 - 29.0 mmol/L Final   01/08/2025 19.0 (L) 22.0 - 29.0 mmol/L Final      BUN BUN   Date Value Ref Range Status   01/11/2025 26 (H) 6 - 20 mg/dL Final   01/11/2025 24 (H) 6 - 20 mg/dL Final   01/10/2025 19 6 - 20 mg/dL Final   01/10/2025 15 6 - 20 mg/dL Final   01/10/2025 16 6 - 20 mg/dL Final   01/10/2025 21 (H) 6 - 20 mg/dL Final   01/09/2025 27 (H) 6 - 20 mg/dL Final   01/09/2025 34 (H) 6 - 20 mg/dL Final   01/09/2025 48 (H) 6 - 20 mg/dL Final   01/08/2025 53 (H) 6 - 20 mg/dL Final   01/08/2025 68 (H) 6 - 20 mg/dL Final      Creatinine Creatinine   Date Value Ref Range Status   01/11/2025 1.35 (H) 0.57 - 1.00 mg/dL Final   01/11/2025 1.48 (H) 0.57 - 1.00 mg/dL Final   01/10/2025 1.12 (H) 0.57 - 1.00 mg/dL Final   01/10/2025 0.84 0.57 - 1.00 mg/dL Final   01/10/2025 0.77 0.57 - 1.00 mg/dL Final   01/10/2025 0.90 0.57 - 1.00 mg/dL Final   01/09/2025 1.10 (H) 0.57 - 1.00 mg/dL Final   01/09/2025 1.55 (H) 0.57 - 1.00 mg/dL Final   01/09/2025 2.31 (H) 0.57 - 1.00 mg/dL Final   01/08/2025 2.58 (H) 0.57 - 1.00 mg/dL Final   01/08/2025 3.57 (H) 0.57 - 1.00 mg/dL Final      Calcium Calcium   Date Value Ref Range Status   01/11/2025 9.7 8.6 - 10.5 mg/dL Final   01/11/2025 9.2 8.6 - 10.5 mg/dL Final   01/10/2025 8.9 8.6 - 10.5 mg/dL Final   01/10/2025 8.9 8.6 - 10.5 mg/dL Final   01/10/2025 8.7 8.6 - 10.5 mg/dL Final   01/10/2025 8.9 8.6 - 10.5 mg/dL Final   01/09/2025 8.7 8.6 - 10.5 mg/dL Final   01/09/2025 8.7 8.6 - 10.5 mg/dL Final   01/09/2025 8.7 8.6 - 10.5 mg/dL Final   01/08/2025 8.3 (L) 8.6 - 10.5 mg/dL Final   01/08/2025 8.8 8.6 - 10.5 mg/dL Final      PO4 No results  "found for: \"CAPO4\"   Albumin Albumin   Date Value Ref Range Status   01/11/2025 3.2 (L) 3.5 - 5.2 g/dL Final   01/10/2025 3.1 (L) 3.5 - 5.2 g/dL Final   01/10/2025 3.4 (L) 3.5 - 5.2 g/dL Final   01/10/2025 3.2 (L) 3.5 - 5.2 g/dL Final   01/10/2025 3.2 (L) 3.5 - 5.2 g/dL Final   01/09/2025 3.1 (L) 3.5 - 5.2 g/dL Final   01/09/2025 2.9 (L) 3.5 - 5.2 g/dL Final   01/09/2025 3.0 (L) 3.5 - 5.2 g/dL Final   01/08/2025 2.8 (L) 3.5 - 5.2 g/dL Final      Magnesium Magnesium   Date Value Ref Range Status   01/11/2025 2.2 1.6 - 2.6 mg/dL Final   01/10/2025 2.3 1.6 - 2.6 mg/dL Final   01/10/2025 2.4 1.6 - 2.6 mg/dL Final   01/10/2025 2.3 1.6 - 2.6 mg/dL Final   01/10/2025 2.3 1.6 - 2.6 mg/dL Final   01/10/2025 2.3 1.6 - 2.6 mg/dL Final   01/09/2025 2.3 1.6 - 2.6 mg/dL Final   01/09/2025 2.3 1.6 - 2.6 mg/dL Final   01/09/2025 2.2 1.6 - 2.6 mg/dL Final   01/08/2025 1.8 1.6 - 2.6 mg/dL Final   01/08/2025 1.9 1.6 - 2.6 mg/dL Final      Uric Acid No results found for: \"URICACID\"         Results from last 7 days   Lab Units 01/11/25  1239 01/11/25  0837 01/11/25  0526 01/10/25  2319 01/10/25  1617 01/10/25  0842 01/10/25  0515 01/09/25  0902 01/09/25  0531 01/08/25  1631 01/08/25  0308   SODIUM mmol/L 138 137  --  132* 135* 134*  --    < >  --    < > 141   POTASSIUM mmol/L 4.7 4.6  --  4.3 4.4 4.1  --    < >  --    < > 5.1   CHLORIDE mmol/L 96* 99  --  95* 99 97*  --    < >  --    < > 103   CO2 mmol/L 11.0* 13.0*  --  14.0* 17.0* 20.0*  --    < >  --    < > 17.0*   BUN mg/dL 26* 24*  --  19 15 16  --    < >  --    < > 65*   CREATININE mg/dL 1.35* 1.48*  --  1.12* 0.84 0.77  --    < >  --    < > 3.47*   CALCIUM mg/dL 9.7 9.2  --  8.9 8.9 8.7  --    < >  --    < > 8.6   ALBUMIN g/dL  --  3.2*  --  3.1* 3.4* 3.2*  --    < >  --    < > 3.1*   WBC 10*3/mm3  --   --  5.35  --   --   --  4.99  --  5.46  --  8.99   HEMOGLOBIN g/dL 7.9*  --  6.9*  --   --   --  7.1*  --  7.2*  --  7.2*   PLATELETS 10*3/mm3  --   --  154  --   --   --  144  -- "  136*  --  177    < > = values in this interval not displayed.       Intake/Output Summary (Last 24 hours) at 1/11/2025 1427  Last data filed at 1/11/2025 1230  Gross per 24 hour   Intake 2850.68 ml   Output 2524 ml   Net 326.68 ml         Medication Review: Yes    Assessment & Plan       Acute respiratory failure with hypercapnia    MARIAA (acute kidney injury)    Type 2 diabetes mellitus with hyperglycemia    Sepsis      Assessment & Plan:    1-MARIAA- non oliguric - Pre-renal azotemia vs sepsis related MARIAA. UA - RBC 3-5, + protein. 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 At home on lisinopril, metformin and topiramate. Initially was improving with IV hydration and hemodynamic support but later developed worsening resp status and volume overload. Required  CRRT ~ 1/8/25-1/11/25.   2- Hyperkalemia - Management with CRRT  3- Hyperglycemia- On admission. Improved.   4- Anemia- Transfuse at hB<7  5- Shock- ff pressor. Hemodynamics improved after aggressive UF with CRRT  6- Severe met acidosis: AG acidosis etiology unspecified. Check ketones, Lactic acid. Phosphorus   7- hx of DM   8- Respiratory distress - suspicion of aspiration pneumonia. Intubated on MV.   9- Hx of Gout   10. Nutrition: On high protein feed discussed with dietician overall protein intake is still <1g/kg/day     Plan:  - Plan for HD today for metabolic acidosis.   - Bumex 2mg BID  - Monitor H/H and transfuse for Hgb less than 7.0   - Dose meds to eGFR<35ml/min while on CRRT.   - Maintain MAP 65 or above.     Discussed with RN and Critical care team  Stanley Velasco MD  01/11/25  14:27 EST

## 2025-01-11 NOTE — PROGRESS NOTES
Intensive Care Follow-up     Hospital:  LOS: 9 days   Ms. Natalia Arzate, 39 y.o. female is followed for:   Acute respiratory failure with hypercapnia        Subjective     Ms. Arzate is a 38yo F with a history of HLD, Hypothyroidism, Afib, PE/DVT, Factor V Leiden mutation, and stroke who presented to [facility] with altered mental status. Labs there were significant for renal failure, hyperglycemia, and hypercapnic respiratory failure. Imaging consistent with basilar pulmonary infiltrates concerning for pneumonia. She was intubated and required the initiation of vasopressors. She was transferred to [facility] on 1/2/25 for ongoing care.      Since arrival, she has continued to require mechanical ventilation along with vasopressors. She has been started on Vancomycin, Flagyl and Cefepime. Cultures are significant for MRSA in the sputum along with gemella sanguinis in the blood.      Nephrology has been following for MARIAA with poor urine output and hyperkalemia.      On the morning of 1/4/25, she developed worsening hypoxia despite an FiO2 of 100% and PEEP of 16. Imaging showed white out of the left lung. Bronchoscopy was performed with mucopurulent secretions suctioned from the left upper and left lower lobes. Repeat CXR after bronchoscopy showed some improvement in left upper lobe aeration with persistent left lower lobe disease vs pleural effusion  Interval History:  The chart has been reviewed.  The patient has remained afebrile overnight.  Patient still has not yet had a bowel movement.  She remains with high residuals with her tube feedings and these are currently on hold.  She remains off pressors.  She was taken off of CRRT yesterday and we are awaiting decisions on intermittent hemodialysis.  She has been waking up a bit and is slightly agitated though not following commands.    The patient's past medical, surgical and social history were reviewed and updated in Epic as appropriate.        Objective  "    Infusions:  fentanyl 10 mcg/mL,  mcg/hr, Last Rate: 100 mcg/hr (01/11/25 1248)  heparin, 25 Units/kg/hr, Last Rate: 25 Units/kg/hr (01/11/25 0912)  Midazolam 1 mg/mL 100mL NS, 1-10 mg/hr, Last Rate: Stopped (01/09/25 1500)  norepinephrine, 0.02-0.3 mcg/kg/min, Last Rate: Stopped (01/08/25 2156)  Pharmacy to Dose Heparin,   phenylephrine, 0.5-3 mcg/kg/min, Last Rate: Stopped (01/10/25 0040)  Phoxillum BK4/2.5, 1,000 mL/hr, Last Rate: 1,000 mL/hr (01/10/25 1310)  Phoxillum BK4/2.5, 1,500 mL/hr, Last Rate: 1,500 mL/hr (01/10/25 1100)  Phoxillum BK4/2.5, 1,000 mL/hr, Last Rate: 1,000 mL/hr (01/10/25 1310)      Medications:  ampicillin-sulbactam, 3 g, Intravenous, Q12H  bumetanide, 2 mg, Intravenous, Q12H  chlorhexidine, 15 mL, Mouth/Throat, Q12H  famotidine, 20 mg, Intravenous, BID  insulin glargine, 35 Units, Subcutaneous, Q12H  insulin regular, 3-14 Units, Subcutaneous, Q6H  methylnaltrexone, 12 mg, Subcutaneous, Every Other Day  nystatin, , Topical, Q12H  senna-docusate sodium, 2 tablet, Nasogastric, BID   And  polyethylene glycol, 17 g, Nasogastric, Daily        Vital Sign Min/Max for last 24 hours  Temp  Min: 97.7 °F (36.5 °C)  Max: 99.2 °F (37.3 °C)   BP  Min: 95/47  Max: 137/73   Pulse  Min: 87  Max: 119   Resp  Min: 24  Max: 34   SpO2  Min: 91 %  Max: 95 %   No data recorded       Input/Output for last 24 hour shift  01/10 0701 - 01/11 0700  In: 2517.6 [I.V.:1656.6]  Out: 2799 [Urine:605]   Mode: VC+/AC  FiO2 (%):  [35 %] 35 %  S RR:  [24] 24  S VT:  [380 mL] 380 mL  PEEP/CPAP (cm H2O):  [8 cm H20-10 cm H20] 8 cm H20  MAP (cm H2O):  [10-17] 10  Objective:  General Appearance:  Ill-appearing, in no acute distress and comfortable (Sedated).    Vital signs: (most recent): Blood pressure 104/50, pulse 113, temperature 98.9 °F (37.2 °C), resp. rate (!) 34, height 162.6 cm (64.02\"), weight (!) 167 kg (369 lb), SpO2 94%, not currently breastfeeding.    HEENT: (Endotracheal tube in place.)    Lungs:  Normal " effort.  There are decreased breath sounds.  No rales, wheezes or rhonchi.    Heart: Tachycardia.  Regular rhythm.  S1 normal and S2 normal.  No murmur.   Chest: Symmetric chest wall expansion.   Abdomen: Abdomen is soft and non-distended.  Hyperactive bowel sounds.   There is no abdominal tenderness.   There is no mass.   Extremities: There is dependent edema.    Neurological: (Sedated but arousable.  Not following).    Pupils:  Pupils are equal, round, and reactive to light.  Pupils are equal.   Skin:  Warm.                Results from last 7 days   Lab Units 01/11/25  1239 01/11/25  0526 01/10/25  0515 01/09/25  0531   WBC 10*3/mm3  --  5.35 4.99 5.46   HEMOGLOBIN g/dL 7.9* 6.9* 7.1* 7.2*   PLATELETS 10*3/mm3  --  154 144 136*     Results from last 7 days   Lab Units 01/11/25  1239 01/11/25  0837 01/10/25  2319 01/10/25  1617   SODIUM mmol/L 138 137 132* 135*   POTASSIUM mmol/L 4.7 4.6 4.3 4.4   CO2 mmol/L 11.0* 13.0* 14.0* 17.0*   BUN mg/dL 26* 24* 19 15   CREATININE mg/dL 1.35* 1.48* 1.12* 0.84   MAGNESIUM mg/dL  --  2.2 2.3 2.4   PHOSPHORUS mg/dL  --  6.4* 6.1* 4.9*   GLUCOSE mg/dL 210* 143* 97 99     Estimated Creatinine Clearance: 88.8 mL/min (A) (by C-G formula based on SCr of 1.35 mg/dL (H)).    Results from last 7 days   Lab Units 01/10/25  0417   PH, ARTERIAL pH units 7.229*   PCO2, ARTERIAL mm Hg 46.1*   PO2 ART mm Hg 88.5         I reviewed the patient's results and images.     Assessment & Plan   Impression        Acute respiratory failure with hypercapnia    MARIAA (acute kidney injury)    Type 2 diabetes mellitus with hyperglycemia    Sepsis       Plan        Current glucose control.  We will continue to hold on tube feedings for now as I feel that she likely does have some degree of ileus.  I would like to go ahead and give her some lactulose down her feeding tube to see if we can stimulate her gut.  Continue with the Relistor for now.  Coagulation as before.  Intermittent hemodialysis as needed.  We  will swap out Precedex and continue to wean ventilator as tolerated.  She is not quite ready for pressure support trials as yet.  Very slow progress.  She remains at very high risk of worsening.    Plan of care and goals reviewed with mulitdisciplinary/antibiotic stewardship team during rounds.   I discussed the patient's findings and my recommendations with nursing staff     High level of risk due to:  drug(s) requiring intensive monitoring for toxicity, parenteral controlled substances, and decision to de-escalate care.        Saji De Jesus MD, [facility]  Pulmonology and Critical Care Medicine

## 2025-01-11 NOTE — PLAN OF CARE
Goal Outcome Evaluation:  Plan of Care Reviewed With: patient        Progress: no change           Pt. Remained sedated, minimal response when sedation held. Re-sedated due to vent asynchrony.  Vent setting unchanged. Pt. Tachypneic. Lungs sounds diminished. Sedation increased. -130. HR . NSR. +PP. UOP, ~100 ml/hour. No BM, bowel regimen adjusted. TF held after recurrent high residuals. No new skin breakdown noted. Pain managed with PRNs. Currently receiving HD, pt. Tolerating it well. The plan is to remove 3,500 ml.       Spoke with POA, she was unable to visit today and plans on coming in tomorrow to discuss POC.

## 2025-01-11 NOTE — PLAN OF CARE
Goal Outcome Evaluation:  Plan of Care Reviewed With: patient        Progress: improving  Outcome Evaluation: Pt rested throughout the night.  Continues to be afebrile.  Vasopressors continue to be off.  UOP remains marginal.  Pt is tolerating tube feeding, residuals were 15mL at midnight and feeding rate was increased to 50mL per hour.  Vent remains set at PEEP 10 and FiO2 35%.  Fentanyl is infusing at 75 mcg/hr, heparin continues to be therapeutic at 25 mcg/kg/hr.  Pt slated for hemodialysis today.  Attempted digital disimpaction again with no results.    ADDENDUM:  Tube feed Residual this AM 300mL.  Residual not returned and tube feeding not increased, however did resume at 50mL.

## 2025-01-12 LAB
ANION GAP SERPL CALCULATED.3IONS-SCNC: 26 MMOL/L (ref 5–15)
ARTERIAL PATENCY WRIST A: ABNORMAL
ATMOSPHERIC PRESS: ABNORMAL MM[HG]
BASE EXCESS BLDA CALC-SCNC: -6.2 MMOL/L (ref 0–2)
BASOPHILS # BLD AUTO: 0.03 10*3/MM3 (ref 0–0.2)
BASOPHILS NFR BLD AUTO: 0.5 % (ref 0–1.5)
BDY SITE: ABNORMAL
BH BB BLOOD EXPIRATION DATE: NORMAL
BH BB BLOOD TYPE BARCODE: 9500
BH BB DISPENSE STATUS: NORMAL
BH BB PRODUCT CODE: NORMAL
BH BB UNIT NUMBER: NORMAL
BODY TEMPERATURE: 37
BUN SERPL-MCNC: 24 MG/DL (ref 6–20)
BUN/CREAT SERPL: 17.4 (ref 7–25)
CALCIUM SPEC-SCNC: 9.3 MG/DL (ref 8.6–10.5)
CHLORIDE SERPL-SCNC: 91 MMOL/L (ref 98–107)
CO2 BLDA-SCNC: 20.3 MMOL/L (ref 22–33)
CO2 SERPL-SCNC: 17 MMOL/L (ref 22–29)
COHGB MFR BLD: 2.2 % (ref 0–2)
CREAT SERPL-MCNC: 1.38 MG/DL (ref 0.57–1)
CROSSMATCH INTERPRETATION: NORMAL
DEPRECATED RDW RBC AUTO: 56.6 FL (ref 37–54)
EGFRCR SERPLBLD CKD-EPI 2021: 50.3 ML/MIN/1.73
EOSINOPHIL # BLD AUTO: 0.11 10*3/MM3 (ref 0–0.4)
EOSINOPHIL NFR BLD AUTO: 2 % (ref 0.3–6.2)
EPAP: 0
ERYTHROCYTE [DISTWIDTH] IN BLOOD BY AUTOMATED COUNT: 16.7 % (ref 12.3–15.4)
GLUCOSE BLDC GLUCOMTR-MCNC: 251 MG/DL (ref 70–130)
GLUCOSE BLDC GLUCOMTR-MCNC: 254 MG/DL (ref 70–130)
GLUCOSE BLDC GLUCOMTR-MCNC: 306 MG/DL (ref 70–130)
GLUCOSE BLDC GLUCOMTR-MCNC: 329 MG/DL (ref 70–130)
GLUCOSE BLDC GLUCOMTR-MCNC: 376 MG/DL (ref 70–130)
GLUCOSE SERPL-MCNC: 239 MG/DL (ref 65–99)
HCO3 BLDA-SCNC: 19.2 MMOL/L (ref 20–26)
HCT VFR BLD AUTO: 25.1 % (ref 34–46.6)
HCT VFR BLD CALC: 23.2 % (ref 38–51)
HGB BLD-MCNC: 7.6 G/DL (ref 12–15.9)
HGB BLDA-MCNC: 7.6 G/DL (ref 14–18)
IMM GRANULOCYTES # BLD AUTO: 0.26 10*3/MM3 (ref 0–0.05)
IMM GRANULOCYTES NFR BLD AUTO: 4.8 % (ref 0–0.5)
INHALED O2 CONCENTRATION: 35 %
IPAP: 0
LYMPHOCYTES # BLD AUTO: 1.06 10*3/MM3 (ref 0.7–3.1)
LYMPHOCYTES NFR BLD AUTO: 19.4 % (ref 19.6–45.3)
MAGNESIUM SERPL-MCNC: 1.9 MG/DL (ref 1.6–2.6)
MCH RBC QN AUTO: 28.8 PG (ref 26.6–33)
MCHC RBC AUTO-ENTMCNC: 30.3 G/DL (ref 31.5–35.7)
MCV RBC AUTO: 95.1 FL (ref 79–97)
METHGB BLD QL: 0.5 % (ref 0–1.5)
MODALITY: ABNORMAL
MONOCYTES # BLD AUTO: 0.38 10*3/MM3 (ref 0.1–0.9)
MONOCYTES NFR BLD AUTO: 6.9 % (ref 5–12)
NEUTROPHILS NFR BLD AUTO: 3.63 10*3/MM3 (ref 1.7–7)
NEUTROPHILS NFR BLD AUTO: 66.4 % (ref 42.7–76)
NRBC BLD AUTO-RTO: 0 /100 WBC (ref 0–0.2)
OXYHGB MFR BLDV: 95.4 % (ref 94–99)
PAW @ PEAK INSP FLOW SETTING VENT: 0 CMH2O
PCO2 BLDA: 36.5 MM HG (ref 35–45)
PCO2 TEMP ADJ BLD: 36.5 MM HG (ref 35–45)
PEEP RESPIRATORY: 8 CM[H2O]
PH BLDA: 7.33 PH UNITS (ref 7.35–7.45)
PH, TEMP CORRECTED: 7.33 PH UNITS
PLATELET # BLD AUTO: 176 10*3/MM3 (ref 140–450)
PMV BLD AUTO: 10.1 FL (ref 6–12)
PO2 BLDA: 95.6 MM HG (ref 83–108)
PO2 TEMP ADJ BLD: 95.6 MM HG (ref 83–108)
POTASSIUM SERPL-SCNC: 3.8 MMOL/L (ref 3.5–5.2)
RBC # BLD AUTO: 2.64 10*6/MM3 (ref 3.77–5.28)
SODIUM SERPL-SCNC: 134 MMOL/L (ref 136–145)
TOTAL RATE: 24 BREATHS/MINUTE
UFH PPP CHRO-ACNC: 0.31 IU/ML (ref 0.3–0.7)
UNIT  ABO: NORMAL
UNIT  RH: NORMAL
VANCOMYCIN SERPL-MCNC: 10.2 MCG/ML (ref 5–40)
VENTILATOR MODE: ABNORMAL
VT ON VENT VENT: 0.38 ML
WBC NRBC COR # BLD AUTO: 5.47 10*3/MM3 (ref 3.4–10.8)

## 2025-01-12 PROCEDURE — 82948 REAGENT STRIP/BLOOD GLUCOSE: CPT

## 2025-01-12 PROCEDURE — 63710000001 INSULIN REGULAR HUMAN PER 5 UNITS

## 2025-01-12 PROCEDURE — 94799 UNLISTED PULMONARY SVC/PX: CPT

## 2025-01-12 PROCEDURE — 99233 SBSQ HOSP IP/OBS HIGH 50: CPT | Performed by: INTERNAL MEDICINE

## 2025-01-12 PROCEDURE — 85520 HEPARIN ASSAY: CPT

## 2025-01-12 PROCEDURE — 80048 BASIC METABOLIC PNL TOTAL CA: CPT | Performed by: INTERNAL MEDICINE

## 2025-01-12 PROCEDURE — 83050 HGB METHEMOGLOBIN QUAN: CPT

## 2025-01-12 PROCEDURE — 63710000001 INSULIN GLARGINE PER 5 UNITS: Performed by: INTERNAL MEDICINE

## 2025-01-12 PROCEDURE — 25010000002 AMPICILLIN-SULBACTAM PER 1.5 G: Performed by: INTERNAL MEDICINE

## 2025-01-12 PROCEDURE — 94003 VENT MGMT INPAT SUBQ DAY: CPT

## 2025-01-12 PROCEDURE — 83735 ASSAY OF MAGNESIUM: CPT | Performed by: INTERNAL MEDICINE

## 2025-01-12 PROCEDURE — 25010000002 VANCOMYCIN 1.5-0.9 GM/500ML-% SOLUTION

## 2025-01-12 PROCEDURE — 25010000002 HEPARIN (PORCINE) 25000-0.45 UT/250ML-% SOLUTION

## 2025-01-12 PROCEDURE — 80202 ASSAY OF VANCOMYCIN: CPT

## 2025-01-12 PROCEDURE — 25010000002 FENTANYL 10 MCG/1 ML NS: Performed by: INTERNAL MEDICINE

## 2025-01-12 PROCEDURE — 82375 ASSAY CARBOXYHB QUANT: CPT

## 2025-01-12 PROCEDURE — 85025 COMPLETE CBC W/AUTO DIFF WBC: CPT | Performed by: INTERNAL MEDICINE

## 2025-01-12 PROCEDURE — 82805 BLOOD GASES W/O2 SATURATION: CPT

## 2025-01-12 PROCEDURE — 25010000002 BUMETANIDE PER 0.5 MG: Performed by: INTERNAL MEDICINE

## 2025-01-12 RX ORDER — VANCOMYCIN/0.9 % SOD CHLORIDE 1.5G/250ML
1500 PLASTIC BAG, INJECTION (ML) INTRAVENOUS ONCE
Status: COMPLETED | OUTPATIENT
Start: 2025-01-12 | End: 2025-01-12

## 2025-01-12 RX ADMIN — HEPARIN SODIUM 25 UNITS/KG/HR: 10000 INJECTION, SOLUTION INTRAVENOUS at 14:08

## 2025-01-12 RX ADMIN — INSULIN HUMAN 8 UNITS: 100 INJECTION, SOLUTION PARENTERAL at 05:18

## 2025-01-12 RX ADMIN — FAMOTIDINE 20 MG: 10 INJECTION, SOLUTION INTRAVENOUS at 08:44

## 2025-01-12 RX ADMIN — ACETAMINOPHEN 650 MG: 650 SOLUTION ORAL at 20:09

## 2025-01-12 RX ADMIN — HEPARIN SODIUM 25 UNITS/KG/HR: 10000 INJECTION, SOLUTION INTRAVENOUS at 21:00

## 2025-01-12 RX ADMIN — INSULIN HUMAN 8 UNITS: 100 INJECTION, SOLUTION PARENTERAL at 00:05

## 2025-01-12 RX ADMIN — NYSTATIN: 100000 POWDER TOPICAL at 20:09

## 2025-01-12 RX ADMIN — HEPARIN SODIUM 25 UNITS/KG/HR: 10000 INJECTION, SOLUTION INTRAVENOUS at 00:25

## 2025-01-12 RX ADMIN — INSULIN HUMAN 12 UNITS: 100 INJECTION, SOLUTION PARENTERAL at 23:51

## 2025-01-12 RX ADMIN — NYSTATIN: 100000 POWDER TOPICAL at 08:44

## 2025-01-12 RX ADMIN — DEXMEDETOMIDINE HYDROCHLORIDE 0.8 MCG/KG/HR: 400 INJECTION INTRAVENOUS at 18:39

## 2025-01-12 RX ADMIN — INSULIN GLARGINE 35 UNITS: 100 INJECTION, SOLUTION SUBCUTANEOUS at 20:09

## 2025-01-12 RX ADMIN — AMPICILLIN SODIUM, SULBACTAM SODIUM 3 G: 2; 1 INJECTION, POWDER, FOR SOLUTION INTRAMUSCULAR; INTRAVENOUS at 15:24

## 2025-01-12 RX ADMIN — CHLORHEXIDINE GLUCONATE 0.12% ORAL RINSE 15 ML: 1.2 LIQUID ORAL at 08:44

## 2025-01-12 RX ADMIN — SENNOSIDES AND DOCUSATE SODIUM 2 TABLET: 50; 8.6 TABLET ORAL at 08:44

## 2025-01-12 RX ADMIN — INSULIN GLARGINE 35 UNITS: 100 INJECTION, SOLUTION SUBCUTANEOUS at 08:44

## 2025-01-12 RX ADMIN — DEXMEDETOMIDINE HYDROCHLORIDE 0.8 MCG/KG/HR: 400 INJECTION INTRAVENOUS at 05:35

## 2025-01-12 RX ADMIN — Medication 1500 MG: at 09:13

## 2025-01-12 RX ADMIN — AMPICILLIN SODIUM, SULBACTAM SODIUM 3 G: 2; 1 INJECTION, POWDER, FOR SOLUTION INTRAMUSCULAR; INTRAVENOUS at 02:08

## 2025-01-12 RX ADMIN — INSULIN HUMAN 10 UNITS: 100 INJECTION, SOLUTION PARENTERAL at 17:51

## 2025-01-12 RX ADMIN — DEXMEDETOMIDINE HYDROCHLORIDE 0.6 MCG/KG/HR: 400 INJECTION INTRAVENOUS at 02:08

## 2025-01-12 RX ADMIN — DEXMEDETOMIDINE HYDROCHLORIDE 1 MCG/KG/HR: 400 INJECTION INTRAVENOUS at 21:00

## 2025-01-12 RX ADMIN — FAMOTIDINE 20 MG: 10 INJECTION, SOLUTION INTRAVENOUS at 20:09

## 2025-01-12 RX ADMIN — Medication 250 MCG/HR: at 21:50

## 2025-01-12 RX ADMIN — CHLORHEXIDINE GLUCONATE 0.12% ORAL RINSE 15 ML: 1.2 LIQUID ORAL at 20:09

## 2025-01-12 RX ADMIN — HEPARIN SODIUM 25 UNITS/KG/HR: 10000 INJECTION, SOLUTION INTRAVENOUS at 07:11

## 2025-01-12 RX ADMIN — DEXMEDETOMIDINE HYDROCHLORIDE 1.2 MCG/KG/HR: 400 INJECTION INTRAVENOUS at 23:19

## 2025-01-12 RX ADMIN — DEXMEDETOMIDINE HYDROCHLORIDE 0.8 MCG/KG/HR: 400 INJECTION INTRAVENOUS at 08:43

## 2025-01-12 RX ADMIN — DEXMEDETOMIDINE HYDROCHLORIDE 0.8 MCG/KG/HR: 400 INJECTION INTRAVENOUS at 12:12

## 2025-01-12 RX ADMIN — Medication 75 MCG/HR: at 00:24

## 2025-01-12 RX ADMIN — BUMETANIDE 2 MG: 0.25 INJECTION INTRAMUSCULAR; INTRAVENOUS at 17:51

## 2025-01-12 RX ADMIN — DEXMEDETOMIDINE HYDROCHLORIDE 0.8 MCG/KG/HR: 400 INJECTION INTRAVENOUS at 15:24

## 2025-01-12 RX ADMIN — POLYETHYLENE GLYCOL 3350 17 G: 17 POWDER, FOR SOLUTION ORAL at 08:44

## 2025-01-12 RX ADMIN — BUMETANIDE 2 MG: 0.25 INJECTION INTRAMUSCULAR; INTRAVENOUS at 05:19

## 2025-01-12 RX ADMIN — INSULIN HUMAN 8 UNITS: 100 INJECTION, SOLUTION PARENTERAL at 12:12

## 2025-01-12 RX ADMIN — SENNOSIDES AND DOCUSATE SODIUM 2 TABLET: 50; 8.6 TABLET ORAL at 20:09

## 2025-01-12 NOTE — PROGRESS NOTES
" LOS: 10 days   Patient Care Team:  Bladimir Calle PA-C as PCP - General (Physician Assistant)    Chief Complaint: MARIAA   Hyperkalemia    Subjective   HD yesterday for AG acidosis. Etiology unspecified. UOP ~ 2.5 liter. UF ~ 2 liter. Doing well today. AG acidosis improved slightly. Volume status better.     History taken from: family RN    Objective     Vital Sign Min/Max for last 24 hours  Temp  Min: 98.8 °F (37.1 °C)  Max: 99.9 °F (37.7 °C)   BP  Min: 99/58  Max: 138/76   Pulse  Min: 95  Max: 114   Resp  Min: 26  Max: 38   SpO2  Min: 88 %  Max: 96 %   No data recorded   Weight  Min: 165 kg (364 lb)  Max: 165 kg (364 lb)         I/O this shift:  In: 762 [I.V.:592; NG/GT:170]  Out: 1050 [Urine:1050]  I/O last 3 completed shifts:  In: 3843.8 [I.V.:2568.8; Blood:324; Other:300; NG/GT:451; IV Piggyback:200]  Out: 5180 [Urine:2680]    Objective    Gen: intubated and sedated.    HENT: NC, AT  NECK: Supple, ETT in place  LUNGS: Intubated on MV  CVS: S1/S2 audible, RRR, no murmur   Abd: Soft, NT, ND, BS+   Ext: +2 UE and LE edema, ( Improved)no cyanosis   CNS: Intubated and sedated.   Psy: Intubated and sedated.   Skin: Warm, dry and intact  : Urena cath +    Results Review:     I reviewed the patient's new clinical results.    WBC WBC   Date Value Ref Range Status   01/12/2025 5.47 3.40 - 10.80 10*3/mm3 Final   01/11/2025 5.35 3.40 - 10.80 10*3/mm3 Final   01/10/2025 4.99 3.40 - 10.80 10*3/mm3 Final      HGB Hemoglobin   Date Value Ref Range Status   01/12/2025 7.6 (L) 12.0 - 15.9 g/dL Final   01/11/2025 7.9 (L) 12.0 - 15.9 g/dL Final   01/11/2025 6.9 (C) 12.0 - 15.9 g/dL Final   01/10/2025 7.1 (L) 12.0 - 15.9 g/dL Final      HCT Hematocrit   Date Value Ref Range Status   01/12/2025 25.1 (L) 34.0 - 46.6 % Final   01/11/2025 26.6 (L) 34.0 - 46.6 % Final   01/11/2025 23.7 (L) 34.0 - 46.6 % Final   01/10/2025 24.7 (L) 34.0 - 46.6 % Final      Platlets No results found for: \"LABPLAT\"   MCV MCV   Date Value Ref Range " Status   01/12/2025 95.1 79.0 - 97.0 fL Final   01/11/2025 100.9 (H) 79.0 - 97.0 fL Final   01/10/2025 98.4 (H) 79.0 - 97.0 fL Final          Sodium Sodium   Date Value Ref Range Status   01/12/2025 134 (L) 136 - 145 mmol/L Final   01/11/2025 138 136 - 145 mmol/L Final   01/11/2025 137 136 - 145 mmol/L Final   01/10/2025 132 (L) 136 - 145 mmol/L Final   01/10/2025 135 (L) 136 - 145 mmol/L Final   01/10/2025 134 (L) 136 - 145 mmol/L Final   01/10/2025 138 136 - 145 mmol/L Final      Potassium Potassium   Date Value Ref Range Status   01/12/2025 3.8 3.5 - 5.2 mmol/L Final   01/11/2025 4.7 3.5 - 5.2 mmol/L Final   01/11/2025 4.6 3.5 - 5.2 mmol/L Final     Comment:     Slight hemolysis detected by analyzer. Result may be falsely elevated.   01/10/2025 4.3 3.5 - 5.2 mmol/L Final   01/10/2025 4.4 3.5 - 5.2 mmol/L Final     Comment:     Slight hemolysis detected by analyzer. Result may be falsely elevated.   01/10/2025 4.1 3.5 - 5.2 mmol/L Final   01/10/2025 4.6 3.5 - 5.2 mmol/L Final      Chloride Chloride   Date Value Ref Range Status   01/12/2025 91 (L) 98 - 107 mmol/L Final   01/11/2025 96 (L) 98 - 107 mmol/L Final   01/11/2025 99 98 - 107 mmol/L Final   01/10/2025 95 (L) 98 - 107 mmol/L Final   01/10/2025 99 98 - 107 mmol/L Final   01/10/2025 97 (L) 98 - 107 mmol/L Final   01/10/2025 101 98 - 107 mmol/L Final      CO2 CO2   Date Value Ref Range Status   01/12/2025 17.0 (L) 22.0 - 29.0 mmol/L Final   01/11/2025 11.0 (L) 22.0 - 29.0 mmol/L Final   01/11/2025 13.0 (L) 22.0 - 29.0 mmol/L Final   01/10/2025 14.0 (L) 22.0 - 29.0 mmol/L Final   01/10/2025 17.0 (L) 22.0 - 29.0 mmol/L Final   01/10/2025 20.0 (L) 22.0 - 29.0 mmol/L Final   01/10/2025 19.0 (L) 22.0 - 29.0 mmol/L Final      BUN BUN   Date Value Ref Range Status   01/12/2025 24 (H) 6 - 20 mg/dL Final   01/11/2025 26 (H) 6 - 20 mg/dL Final   01/11/2025 24 (H) 6 - 20 mg/dL Final   01/10/2025 19 6 - 20 mg/dL Final   01/10/2025 15 6 - 20 mg/dL Final   01/10/2025 16 6  "- 20 mg/dL Final   01/10/2025 21 (H) 6 - 20 mg/dL Final      Creatinine Creatinine   Date Value Ref Range Status   01/12/2025 1.38 (H) 0.57 - 1.00 mg/dL Final   01/11/2025 1.35 (H) 0.57 - 1.00 mg/dL Final   01/11/2025 1.48 (H) 0.57 - 1.00 mg/dL Final   01/10/2025 1.12 (H) 0.57 - 1.00 mg/dL Final   01/10/2025 0.84 0.57 - 1.00 mg/dL Final   01/10/2025 0.77 0.57 - 1.00 mg/dL Final   01/10/2025 0.90 0.57 - 1.00 mg/dL Final      Calcium Calcium   Date Value Ref Range Status   01/12/2025 9.3 8.6 - 10.5 mg/dL Final   01/11/2025 9.7 8.6 - 10.5 mg/dL Final   01/11/2025 9.2 8.6 - 10.5 mg/dL Final   01/10/2025 8.9 8.6 - 10.5 mg/dL Final   01/10/2025 8.9 8.6 - 10.5 mg/dL Final   01/10/2025 8.7 8.6 - 10.5 mg/dL Final   01/10/2025 8.9 8.6 - 10.5 mg/dL Final      PO4 No results found for: \"CAPO4\"   Albumin Albumin   Date Value Ref Range Status   01/11/2025 3.2 (L) 3.5 - 5.2 g/dL Final   01/10/2025 3.1 (L) 3.5 - 5.2 g/dL Final   01/10/2025 3.4 (L) 3.5 - 5.2 g/dL Final   01/10/2025 3.2 (L) 3.5 - 5.2 g/dL Final   01/10/2025 3.2 (L) 3.5 - 5.2 g/dL Final      Magnesium Magnesium   Date Value Ref Range Status   01/12/2025 1.9 1.6 - 2.6 mg/dL Final   01/11/2025 2.2 1.6 - 2.6 mg/dL Final   01/10/2025 2.3 1.6 - 2.6 mg/dL Final   01/10/2025 2.4 1.6 - 2.6 mg/dL Final   01/10/2025 2.3 1.6 - 2.6 mg/dL Final   01/10/2025 2.3 1.6 - 2.6 mg/dL Final   01/10/2025 2.3 1.6 - 2.6 mg/dL Final      Uric Acid No results found for: \"URICACID\"         Results from last 7 days   Lab Units 01/12/25  0525 01/11/25  1239 01/11/25  0837 01/11/25  0526 01/10/25  2319 01/10/25  1617 01/10/25  0842 01/10/25  0515 01/09/25  0902 01/09/25  0531   SODIUM mmol/L 134* 138 137  --  132* 135* 134*  --    < >  --    POTASSIUM mmol/L 3.8 4.7 4.6  --  4.3 4.4 4.1  --    < >  --    CHLORIDE mmol/L 91* 96* 99  --  95* 99 97*  --    < >  --    CO2 mmol/L 17.0* 11.0* 13.0*  --  14.0* 17.0* 20.0*  --    < >  --    BUN mg/dL 24* 26* 24*  --  19 15 16  --    < >  --    CREATININE " mg/dL 1.38* 1.35* 1.48*  --  1.12* 0.84 0.77  --    < >  --    CALCIUM mg/dL 9.3 9.7 9.2  --  8.9 8.9 8.7  --    < >  --    ALBUMIN g/dL  --   --  3.2*  --  3.1* 3.4* 3.2*  --    < >  --    WBC 10*3/mm3 5.47  --   --  5.35  --   --   --  4.99  --  5.46   HEMOGLOBIN g/dL 7.6* 7.9*  --  6.9*  --   --   --  7.1*  --  7.2*   PLATELETS 10*3/mm3 176  --   --  154  --   --   --  144  --  136*    < > = values in this interval not displayed.       Intake/Output Summary (Last 24 hours) at 1/12/2025 1702  Last data filed at 1/12/2025 1200  Gross per 24 hour   Intake 2220.8 ml   Output 4210 ml   Net -1989.2 ml         Medication Review: Yes    Assessment & Plan       Acute respiratory failure with hypercapnia    MARIAA (acute kidney injury)    Type 2 diabetes mellitus with hyperglycemia    Sepsis      Assessment & Plan:    1-MARIAA- non oliguric - Pre-renal azotemia vs sepsis related MARIAA. UA - RBC 3-5, + protein. 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 At home on lisinopril, metformin and topiramate. Initially was improving with IV hydration and hemodynamic support but later developed worsening resp status and volume overload. Required  CRRT ~ 1/8/25-1/11/25.   2- Hyperkalemia - Management with CRRT  3- Hyperglycemia- On admission. Improved.   4- Anemia- Transfuse at hB<7  5- Shock- ff pressor. Hemodynamics improved after aggressive UF with CRRT  6- Severe met acidosis: AG acidosis etiology unspecified. Etiology unspecified. Did have + betahydroxybutyrate. Starvation ketosis ?? Tube feed on hold. Lactic acid is normal.   7- hx of DM   8- Respiratory distress - suspicion of aspiration pneumonia. Intubated on MV.   9- Hx of Gout   10. Nutrition: On high protein feed discussed with dietician overall protein intake is still <1g/kg/day     Plan:  - Daily eval for HD. Required HD for AG acidosis.   - Bumex 2mg BID  - Monitor H/H and transfuse for Hgb less than 7.0   - Dose meds to eGFR<35ml/min while on CRRT.   - Maintain MAP  65 or above.     Discussed with RN and Critical care team  Stanley Velasco MD  01/12/25  17:02 EST

## 2025-01-12 NOTE — PLAN OF CARE
Goal Outcome Evaluation:  Plan of Care Reviewed With: patient        Progress: improving  Outcome Evaluation: Pt continues on ventilator at PEEP 8 and FiO2 35%.  Pt is overbreathing the vent at a rate of 26 when not being interacted with, and 36 when she is.  Low grade fevers consistently throughout shift.  Urine output steady at about 50mL per hour.  Fentanyl currently infusing at 100mcg/hr, precedex at 0.8 mcg/kg/hr, and heparin at 25 mcg/kg/hr.  Tube feeding remains on hold.

## 2025-01-12 NOTE — PROGRESS NOTES
Intensive Care Follow-up     Hospital:  LOS: 10 days   Ms. Natalia Arzate, 39 y.o. female is followed for:   Acute respiratory failure with hypercapnia        Subjective     Ms. Arzate is a 40yo F with a history of HLD, Hypothyroidism, Afib, PE/DVT, Factor V Leiden mutation, and stroke who presented to [facility] [place] with altered mental status. Labs there were significant for renal failure, hyperglycemia, and hypercapnic respiratory failure. Imaging consistent with basilar pulmonary infiltrates concerning for pneumonia. She was intubated and required the initiation of vasopressors. She was transferred to [facility] on 1/2/25 for ongoing care.      Since arrival, she has continued to require mechanical ventilation along with vasopressors. She has been started on Vancomycin, Flagyl and Cefepime. Cultures are significant for MRSA in the sputum along with gemella sanguinis in the blood.      Nephrology has been following for MARIAA with poor urine output and hyperkalemia.      On the morning of 1/4/25, she developed worsening hypoxia despite an FiO2 of 100% and PEEP of 16. Imaging showed white out of the left lung. Bronchoscopy was performed with mucopurulent secretions suctioned from the left upper and left lower lobes. Repeat CXR after bronchoscopy showed some improvement in left upper lobe aeration with persistent left lower lobe disease vs pleural effusion  Interval History:  Has been reviewed.  Maximum temperature was 100.7 °F overnight.  Currently the patient is at 35% FiO2 with 8 of PEEP.  Still with no bowel movement despite interventions.  Culture data has been reviewed.    The patient's past medical, surgical and social history were reviewed and updated in Epic as appropriate.        Objective     Infusions:  dexmedetomidine, 0.2-1.5 mcg/kg/hr, Last Rate: 0.8 mcg/kg/hr (01/12/25 1212)  fentanyl 10 mcg/mL,  mcg/hr, Last Rate: 100 mcg/hr (01/12/25 0288)  heparin, 25 Units/kg/hr, Last Rate: 25 Units/kg/hr (01/12/25  "0711)  norepinephrine, 0.02-0.3 mcg/kg/min, Last Rate: Stopped (01/08/25 2156)  Pharmacy to Dose Heparin,   phenylephrine, 0.5-3 mcg/kg/min, Last Rate: Stopped (01/10/25 0040)      Medications:  ampicillin-sulbactam, 3 g, Intravenous, Q12H  bumetanide, 2 mg, Intravenous, Q12H  chlorhexidine, 15 mL, Mouth/Throat, Q12H  famotidine, 20 mg, Intravenous, BID  insulin glargine, 35 Units, Subcutaneous, Q12H  insulin regular, 3-14 Units, Subcutaneous, Q6H  methylnaltrexone, 12 mg, Subcutaneous, Every Other Day  nystatin, , Topical, Q12H  senna-docusate sodium, 2 tablet, Nasogastric, BID   And  polyethylene glycol, 17 g, Nasogastric, Daily  vancomycin (dosing per levels), , Not Applicable, Daily        Vital Sign Min/Max for last 24 hours  Temp  Min: 98.8 °F (37.1 °C)  Max: 100.7 °F (38.2 °C)   BP  Min: 99/58  Max: 138/76   Pulse  Min: 95  Max: 117   Resp  Min: 26  Max: 38   SpO2  Min: 91 %  Max: 96 %   No data recorded       Input/Output for last 24 hour shift  01/11 0701 - 01/12 0700  In: 2162.8 [I.V.:1518.8]  Out: 4810 [Urine:2310]   Mode: VC+/AC  FiO2 (%):  [35 %] 35 %  S RR:  [24] 24  S VT:  [380 mL] 380 mL  PEEP/CPAP (cm H2O):  [8 cm H20] 8 cm H20  MAP (cm H2O):  [11-21] 12  Objective:  General Appearance:  Ill-appearing, in no acute distress and uncomfortable (Sedated).    Vital signs: (most recent): Blood pressure 121/69, pulse 112, temperature 98.8 °F (37.1 °C), temperature source Oral, resp. rate 28, height 162.6 cm (64.02\"), weight (!) 165 kg (364 lb), SpO2 91%, not currently breastfeeding.    HEENT: (Endotracheal tube in place.)    Lungs:  Normal effort.  There are decreased breath sounds.  No rales, wheezes or rhonchi.    Heart: Tachycardia.  Regular rhythm.  S1 normal and S2 normal.  No murmur.   Chest: Symmetric chest wall expansion.   Abdomen: Abdomen is soft and non-distended.  Hyperactive bowel sounds.   There is no abdominal tenderness.   There is no mass.   Extremities: There is dependent edema.  "   Neurological: (Sedated but arousable.  Not following).    Pupils:  Pupils are equal, round, and reactive to light.  Pupils are equal.   Skin:  Warm.                Results from last 7 days   Lab Units 01/12/25  0525 01/11/25  1239 01/11/25  0526 01/10/25  0515   WBC 10*3/mm3 5.47  --  5.35 4.99   HEMOGLOBIN g/dL 7.6* 7.9* 6.9* 7.1*   PLATELETS 10*3/mm3 176  --  154 144     Results from last 7 days   Lab Units 01/12/25  0525 01/11/25  1239 01/11/25  0837 01/10/25  2319 01/10/25  1617   SODIUM mmol/L 134* 138 137 132* 135*   POTASSIUM mmol/L 3.8 4.7 4.6 4.3 4.4   CO2 mmol/L 17.0* 11.0* 13.0* 14.0* 17.0*   BUN mg/dL 24* 26* 24* 19 15   CREATININE mg/dL 1.38* 1.35* 1.48* 1.12* 0.84   MAGNESIUM mg/dL 1.9  --  2.2 2.3 2.4   PHOSPHORUS mg/dL  --   --  6.4* 6.1* 4.9*   GLUCOSE mg/dL 239* 210* 143* 97 99     Estimated Creatinine Clearance: 86.2 mL/min (A) (by C-G formula based on SCr of 1.38 mg/dL (H)).    Results from last 7 days   Lab Units 01/12/25  0330   PH, ARTERIAL pH units 7.330*   PCO2, ARTERIAL mm Hg 36.5   PO2 ART mm Hg 95.6         I reviewed the patient's results and images.     Assessment & Plan   Impression        Acute respiratory failure with hypercapnia    MARIAA (acute kidney injury)    Type 2 diabetes mellitus with hyperglycemia    Sepsis       Plan        Continue with renal replacement therapy as before.  I will try to intensify the patient's glucose control.  Wean ventilator as tolerated.  We are currently at 35% with 8 of PEEP.  I believe that we are close to trying spontaneous mode of respiration potentially tomorrow.  Still with no bowel movement.  We will continue on with current regimen.  Hold tube feedings for now.  Continue to monitor anemia closely.  Follow-up labs and orders are placed for tomorrow morning.    Plan of care and goals reviewed with mendozadisciplinary/antibiotic stewardship team during rounds.   I discussed the patient's findings and my recommendations with nursing staff     High  level of risk due to:  drug(s) requiring intensive monitoring for toxicity and parenteral controlled substances.        Saji De Jesus MD, [facility]  Pulmonology and Critical Care Medicine

## 2025-01-12 NOTE — PLAN OF CARE
Problem: Hemodialysis  Goal: Safe, Effective Therapy Delivery  Outcome: Progressing  Goal: Effective Tissue Perfusion  Outcome: Progressing  Goal: Absence of Infection Signs and Symptoms  Outcome: Progressing   Goal Outcome Evaluation:   HD completed. Tolerated well. UF goal 2.5 liters reached. Blood returned. Report given to primary RN Marcus.

## 2025-01-12 NOTE — PLAN OF CARE
Goal Outcome Evaluation:                            Unable to decrease vent support

## 2025-01-12 NOTE — PLAN OF CARE
-Continues on mechanical vent, PEEP 8, FiO2 increased from 35% to 50%   -Continues with Precedex at 0.8, Fentanyl at 150  -Continues with heparin gtt, currently therapeutic, will recheck labs in AM  -continues with low grade temp, tmax 99.6  - adequate UOP, 1450 total UOP  -Tube feeding continues to be held  -Possibly getting dialysis tomorrow  -No BM this shift, provider aware, bowel sounds hyperactive

## 2025-01-12 NOTE — PROGRESS NOTES
Pharmacy Consult - Vancomycin Dosing and Monitoring (Renal Dysfunction / Dialysis)    Natalia Arzate is a 39 y.o. female receiving vancomycin therapy.     Indication:  MRSA PNA  Consulting Provider:   Intensivist  ID Consult:  No    Goal Trough (Pulse Dosing):  15-20 mcg/mL    Current Antimicrobial Therapy  Anti-Infectives (From admission, onward)      Ordered     Dose/Rate Route Frequency Start Stop    01/11/25 1528  vancomycin (dosing per levels)        Ordering Provider: David Moulton RPH     Not Applicable Daily 01/11/25 1615 01/14/25 0859    01/08/25 1316  vancomycin IVPB 1750 mg in 0.9% Sodium Chloride (premix) 500 mL  Status:  Discontinued        Ordering Provider: Monalisa Simms PharmD    1,750 mg  285.7 mL/hr over 105 Minutes Intravenous Every 24 Hours 01/08/25 1800 01/10/25 2146    01/07/25 1306  vancomycin (dosing per levels)  Status:  Discontinued        Ordering Provider: Monalisa Simms PharmD     Not Applicable Daily 01/07/25 1400 01/08/25 1316    01/06/25 1143  ampicillin-sulbactam (UNASYN) 3 g in sodium chloride 0.9 % 100 mL MBP        Ordering Provider: Saji De Jesus MD    3 g  over 30 Minutes Intravenous Every 12 Hours 01/06/25 1500 01/13/25 1459    01/05/25 0738  ampicillin 2000 mg IVPB in 100 mL NS (MBP)  Status:  Discontinued        Ordering Provider: Tiffanie You., DO    2 g  200 mL/hr over 30 Minutes Intravenous Every 6 Hours 01/05/25 0900 01/06/25 1143    01/05/25 0800  Vancomycin HCl 1,250 mg in sodium chloride 0.9 % 250 mL VTB        Ordering Provider: Rocky Brewster RPH    1,250 mg  200 mL/hr over 75 Minutes Intravenous Once 01/05/25 0900 01/05/25 1105    01/04/25 1511  ampicillin 2000 mg IVPB in 100 mL NS (MBP)  Status:  Discontinued        Ordering Provider: Bettie Walter APRN    2 g  200 mL/hr over 30 Minutes Intravenous Every 4 Hours 01/04/25 2100 01/05/25 0738    01/04/25 1500  ampicillin 1780 mg/100 mL (89% dose) IVPB for IV graded dose challenge       "  Ordering Provider: Bettie Walter APRN   Placed in \"Followed by\" Linked Group    1,780 mg  400 mL/hr over 15 Minutes Intravenous Once 01/04/25 1800 01/04/25 1818    01/04/25 1500  ampicillin 200 mg/100 mL (10% dose) IVPB for IV graded dose challenge        Ordering Provider: Bettie Walter APRN   Placed in \"Followed by\" Linked Group    200 mg  400 mL/hr over 15 Minutes Intravenous Once 01/04/25 1700 01/04/25 1712    01/04/25 1500  ampicillin 20 mg/100 mL (1% dose) IVPB for IV graded dose challenge        Ordering Provider: Bettie Walter APRN   Placed in \"Followed by\" Linked Group    20 mg  400 mL/hr over 15 Minutes Intravenous Once 01/04/25 1600 01/04/25 1617    01/04/25 0833  vancomycin (dosing per levels)  Status:  Discontinued        Ordering Provider: Rocky Brewster [facility]     Not Applicable Daily 01/04/25 0930 01/07/25 1306    01/03/25 1114  cefepime 2000 mg IVPB in 100 mL NS (MBP)  Status:  Discontinued        Ordering Provider: David Lazaro PharmD    2,000 mg  over 4 Hours Intravenous Every 8 Hours 01/03/25 1600 01/04/25 1500    01/02/25 1548  vancomycin (dosing per levels)  Status:  Discontinued        Ordering Provider: David Lazaro PharmD     Not Applicable Daily 01/03/25 0900 01/03/25 0738    01/03/25 0738  Vancomycin HCl 1,250 mg in sodium chloride 0.9 % 250 mL VTB  Status:  Discontinued        Ordering Provider: David Lazaro PharmD    1,250 mg  200 mL/hr over 75 Minutes Intravenous Every 12 Hours 01/03/25 0900 01/04/25 0833    01/02/25 1231  cefepime 2000 mg IVPB in 100 mL NS (MBP)  Status:  Discontinued        Ordering Provider: Bettie Walter APRN    2,000 mg  over 4 Hours Intravenous Every 12 Hours 01/02/25 2100 01/03/25 1114    01/02/25 1952  metroNIDAZOLE (FLAGYL) IVPB 500 mg  Status:  Discontinued        Ordering Provider: Roly Garcia MD    500 mg  200 mL/hr over 30 Minutes Intravenous Every 8 Hours 01/02/25 2100 01/06/25 1143    01/02/25 1547  vancomycin " "(VANCOCIN) 1,000 mg in sodium chloride 0.9 % 250 mL IVPB-VTB        Ordering Provider: David Lazaro, Jose E    1,000 mg  250 mL/hr over 60 Minutes Intravenous Once 01/02/25 1645 01/02/25 1811    01/02/25 1511  Pharmacy to dose vancomycin        Ordering Provider: David Lazaro PharmD     Not Applicable Continuous PRN 01/02/25 1509 01/09/25 1508    01/02/25 1235  cefepime 2000 mg IVPB in 100 mL NS (MBP)        Ordering Provider: Bettie Walter APRN    2,000 mg  over 30 Minutes Intravenous Once 01/02/25 1330 01/02/25 1317    01/02/25 1228  cefTRIAXone (ROCEPHIN) 2,000 mg in sodium chloride 0.9 % 100 mL MBP  Status:  Discontinued        Ordering Provider: Bettie Walter APRN    2,000 mg  200 mL/hr over 30 Minutes Intravenous Every 24 Hours 01/02/25 1315 01/02/25 1230          Allergies  Allergies as of 01/02/2025 - Reviewed 01/02/2025   Allergen Reaction Noted    Salvia officinalis Itching, Other (See Comments), and Shortness Of Breath 03/02/2023    Shellfish allergy Anaphylaxis 12/30/2018    Toradol [ketorolac tromethamine] Anaphylaxis and Hives 06/27/2016    Tree nut Anaphylaxis and Shortness Of Breath 03/16/2022    Haldol [haloperidol] Hives and Mental Status Change 03/04/2020    Tramadol Hives and Swelling 12/08/2018    Amoxicillin Hives and Rash 06/27/2016    Penicillins Hives and Rash 06/27/2016     Labs  Results from last 7 days   Lab Units 01/12/25  0525 01/11/25  1239 01/11/25  0837   BUN mg/dL 24* 26* 24*   CREATININE mg/dL 1.38* 1.35* 1.48*     Results from last 7 days   Lab Units 01/12/25  0525 01/11/25  0526 01/10/25  0515   WBC 10*3/mm3 5.47 5.35 4.99     Evaluation of Dosing     Last Dose Received in the ED/Outside Facility: 2500mg IV x 1 given 1/2 at ~2100  Is Patient on Dialysis or Renal Replacement: No; potential RRT    Height - 162.6 cm (64.02\")  Weight - (!) 165 kg (364 lb)    Estimated Creatinine Clearance: 86.2 mL/min (A) (by C-G formula based on SCr of 1.38 mg/dL (H)).    Intake & " Output (last 2 days)         01/10 0701  01/11 0700 01/11 0701  01/12 0700 01/12 0701  01/13 0700    P.O. 0      I.V. (mL/kg) 1656.6 (9.9) 1518.8 (9.2)     Blood  324     Other 292 75     NG/ 145     IV Piggyback 208 100     Total Intake(mL/kg) 2517.6 (15.1) 2162.8 (13.1)     Urine (mL/kg/hr) 605 (0.2) 2310 (0.6)     Dialysis 2194 2500     Total Output 2799 4810     Net -281.4 -2647.2                  Microbiology  Microbiology Results (last 10 days)       Procedure Component Value - Date/Time    Blood Culture - Blood, Hand, Left [[id number]]  (Normal) Collected: 01/08/25 1624    Lab Status: Preliminary result Specimen: Blood from Hand, Left Updated: 01/11/25 1732     Blood Culture No growth at 3 days    Blood Culture - Blood, Blood, Arterial Line [[id number]]  (Normal) Collected: 01/08/25 1602    Lab Status: Preliminary result Specimen: Blood, Arterial Line Updated: 01/11/25 1732     Blood Culture No growth at 3 days    Blood Culture - Blood, Arm, Right [[id number]]  (Normal) Collected: 01/05/25 1308    Lab Status: Final result Specimen: Blood from Arm, Right Updated: 01/10/25 1330     Blood Culture No growth at 5 days    Narrative:      Less than seven (7) mL's of blood was collected.  Insufficient quantity may yield false negative results.    Blood Culture - Blood, Arm, Left [[id number]]  (Abnormal) Collected: 01/05/25 1305    Lab Status: Final result Specimen: Blood from Arm, Left Updated: 01/07/25 1100     Blood Culture Staphylococcus, coagulase negative     Isolated from Anaerobic Bottle     Gram Stain Anaerobic Bottle Gram positive cocci in clusters    Narrative:      Less than seven (7) mL's of blood was collected.  Insufficient quantity may yield false negative results.  Probable contaminant requires clinical correlation, susceptibility not performed unless requested by physician.    Blood Culture ID, PCR - Blood, Arm, Left [[id number]]  (Abnormal) Collected: 01/05/25 1305    Lab Status: Final result  Specimen: Blood from Arm, Left Updated: 01/06/25 1435     BCID, PCR Staph spp, not aureus or lugdunensis. Identification by BCID2 PCR.     BOTTLE TYPE Anaerobic Bottle    Respiratory Culture - Bronchial Wash, Lung, Left Lower Lobe [[id number]]  (Abnormal)  (Susceptibility) Collected: 01/04/25 0755    Lab Status: Final result Specimen: Bronchial Wash from Lung, Left Lower Lobe Updated: 01/06/25 0908     Respiratory Culture Scant growth (1+) Staphylococcus aureus, MRSA     Comment:   Methicillin resistant Staphylococcus aureus, Patient may be an isolation risk.         No Normal Respiratory Margo     Gram Stain Moderate (3+) WBCs seen      Rare (1+) Gram positive cocci in pairs and clusters    Susceptibility        Staphylococcus aureus, MRSA      KELSIE      Clindamycin 0.25 ug/ml Resistant      Linezolid 2 ug/ml Susceptible      Oxacillin >=4 ug/ml Resistant      Tetracycline <=1 ug/ml Susceptible      Trimethoprim + Sulfamethoxazole <=10 ug/ml Susceptible      Vancomycin 1 ug/ml Susceptible                       Susceptibility Comments       Staphylococcus aureus, MRSA    This isolate is presumed to be clindamycin resistant based on detection of inducible clindamycin resistance.  Clindamycin may still be effective in some patients.  This isolate is presumed to be clindamycin resistant based on detection of inducible clindamycin resistance.  Clindamycin may still be effective in some patients.               Eosinophil Smear - Urine, Indwelling Urethral Catheter [[id number]]  (Normal) Collected: 01/02/25 1542    Lab Status: Final result Specimen: Urine from Indwelling Urethral Catheter Updated: 01/02/25 1700     Eosinophil Smear 0 % EOS/100 Cells     Narrative:      No eosinophil seen    MRSA Screen, PCR (Inpatient) - Swab, Nares [[id number]]  (Abnormal) Collected: 01/02/25 1246    Lab Status: Final result Specimen: Swab from Nares Updated: 01/02/25 1504     MRSA PCR Positive    Narrative:      The negative predictive  value of this diagnostic test is high and should only be used to consider de-escalating anti-MRSA therapy. A positive result may indicate colonization with MRSA and must be correlated clinically.    Urine Culture - Urine, Indwelling Urethral Catheter [[id number]]  (Normal) Collected: 01/02/25 1213    Lab Status: Final result Specimen: Urine from Indwelling Urethral Catheter Updated: 01/04/25 1251     Urine Culture No growth    S. Pneumo Ag Urine or CSF - Urine, Urine, Clean Catch [[id number]]  (Normal) Collected: 01/02/25 1213    Lab Status: Final result Specimen: Urine, Clean Catch Updated: 01/02/25 1913     Strep Pneumo Ag Negative    Legionella Antigen, Urine - Urine, Urine, Clean Catch [[id number]]  (Normal) Collected: 01/02/25 1213    Lab Status: Final result Specimen: Urine, Clean Catch Updated: 01/02/25 1913     LEGIONELLA ANTIGEN, URINE Negative    Respiratory Culture - Sputum, ET Suction [[id number]]  (Abnormal)  (Susceptibility) Collected: 01/02/25 1212    Lab Status: Final result Specimen: Sputum from ET Suction Updated: 01/04/25 0939     Respiratory Culture Moderate growth (3+) Staphylococcus aureus, MRSA      No Normal Respiratory Margo     Gram Stain Many (4+) WBCs per low power field      Rare (1+) Epithelial cells per low power field      Many (4+) Gram positive cocci in pairs and clusters    Susceptibility        Staphylococcus aureus, MRSA      KELSIE      Clindamycin 0.25 ug/ml Resistant      Linezolid 2 ug/ml Susceptible      Oxacillin >=4 ug/ml Resistant      Tetracycline <=1 ug/ml Susceptible      Trimethoprim + Sulfamethoxazole <=10 ug/ml Susceptible      Vancomycin <=0.5 ug/ml Susceptible                       Susceptibility Comments       Staphylococcus aureus, MRSA    This isolate is presumed to be clindamycin resistant based on detection of inducible clindamycin resistance.  Clindamycin may still be effective in some patients.                     Vancomycin Levels  Results from last 7 days   Lab  Units 01/12/25  0525 01/10/25  1617 01/08/25  0308 01/07/25  0433 01/06/25  0319   VANCOMYCIN RM mcg/mL 10.20 8.70 17.00 21.70 27.70         Results from last 7 days   Lab Units 01/11/25  0526   VANCOMYCIN TR mcg/mL 22.40*         Assessment/Plan:    PTD vancomycin 2/2 MRSA PNA.   Of note, patient with MARIAA, solitary kidney, and new initiation of CRRT.   Vancomycin random level at end of CRRT on 1/10 @ 1617 was 8.7 mcg/mL. Per nephrology, starting HD 1/11. Patient received a dose of vancomycin 1750mg (~10mg/kg) on 1/10 @ 1812 following CRRT.  Post-HD vancomycin level on 1/12 w/ AM labs was 10.2 mcg/mL   Will redose today with 1500 mg x 1 and continue dosing per levels based on dialysis schedule.   Patient tolerated ampicillin graded-dose challenge. Per discussion in rounds, consolidated antimicrobial regimen to vancomycin/Unasyn.   Pharmacy will continue to follow & adjust dose based on renal function, drug levels, & clinical status.    Thank you,  David Moulton, [facility]   01/12/25  07:14 EST       right normal/left normal

## 2025-01-13 ENCOUNTER — APPOINTMENT (OUTPATIENT)
Dept: GENERAL RADIOLOGY | Facility: HOSPITAL | Age: 40
DRG: 870 | End: 2025-01-13
Payer: COMMERCIAL

## 2025-01-13 LAB
ANION GAP SERPL CALCULATED.3IONS-SCNC: 21 MMOL/L (ref 5–15)
ARTERIAL PATENCY WRIST A: ABNORMAL
ATMOSPHERIC PRESS: ABNORMAL MM[HG]
BACTERIA SPEC AEROBE CULT: NORMAL
BACTERIA SPEC AEROBE CULT: NORMAL
BASE EXCESS BLDA CALC-SCNC: -1.2 MMOL/L (ref 0–2)
BASOPHILS # BLD AUTO: 0.02 10*3/MM3 (ref 0–0.2)
BASOPHILS NFR BLD AUTO: 0.4 % (ref 0–1.5)
BDY SITE: ABNORMAL
BODY TEMPERATURE: 37
BUN SERPL-MCNC: 31 MG/DL (ref 6–20)
BUN/CREAT SERPL: 20.9 (ref 7–25)
CALCIUM SPEC-SCNC: 9.2 MG/DL (ref 8.6–10.5)
CHLORIDE SERPL-SCNC: 94 MMOL/L (ref 98–107)
CO2 BLDA-SCNC: 25.5 MMOL/L (ref 22–33)
CO2 SERPL-SCNC: 22 MMOL/L (ref 22–29)
COHGB MFR BLD: 1.7 % (ref 0–2)
CREAT SERPL-MCNC: 1.48 MG/DL (ref 0.57–1)
DEPRECATED RDW RBC AUTO: 54.9 FL (ref 37–54)
EGFRCR SERPLBLD CKD-EPI 2021: 46.3 ML/MIN/1.73
EOSINOPHIL # BLD AUTO: 0.06 10*3/MM3 (ref 0–0.4)
EOSINOPHIL NFR BLD AUTO: 1.1 % (ref 0.3–6.2)
EPAP: 0
ERYTHROCYTE [DISTWIDTH] IN BLOOD BY AUTOMATED COUNT: 16.1 % (ref 12.3–15.4)
GLUCOSE BLDC GLUCOMTR-MCNC: 352 MG/DL (ref 70–130)
GLUCOSE BLDC GLUCOMTR-MCNC: 357 MG/DL (ref 70–130)
GLUCOSE BLDC GLUCOMTR-MCNC: 384 MG/DL (ref 70–130)
GLUCOSE BLDC GLUCOMTR-MCNC: 387 MG/DL (ref 70–130)
GLUCOSE BLDC GLUCOMTR-MCNC: 408 MG/DL (ref 70–130)
GLUCOSE BLDC GLUCOMTR-MCNC: 414 MG/DL (ref 70–130)
GLUCOSE BLDC GLUCOMTR-MCNC: 414 MG/DL (ref 70–130)
GLUCOSE BLDC GLUCOMTR-MCNC: 415 MG/DL (ref 70–130)
GLUCOSE BLDC GLUCOMTR-MCNC: 417 MG/DL (ref 70–130)
GLUCOSE BLDC GLUCOMTR-MCNC: 432 MG/DL (ref 70–130)
GLUCOSE BLDC GLUCOMTR-MCNC: 434 MG/DL (ref 70–130)
GLUCOSE BLDC GLUCOMTR-MCNC: 436 MG/DL (ref 70–130)
GLUCOSE BLDC GLUCOMTR-MCNC: 460 MG/DL (ref 70–130)
GLUCOSE SERPL-MCNC: 380 MG/DL (ref 65–99)
HCO3 BLDA-SCNC: 24.2 MMOL/L (ref 20–26)
HCT VFR BLD AUTO: 24.4 % (ref 34–46.6)
HCT VFR BLD CALC: 23.6 % (ref 38–51)
HGB BLD-MCNC: 7.5 G/DL (ref 12–15.9)
HGB BLDA-MCNC: 7.7 G/DL (ref 14–18)
IMM GRANULOCYTES # BLD AUTO: 0.23 10*3/MM3 (ref 0–0.05)
IMM GRANULOCYTES NFR BLD AUTO: 4.1 % (ref 0–0.5)
INHALED O2 CONCENTRATION: 45 %
IPAP: 0
LYMPHOCYTES # BLD AUTO: 1.09 10*3/MM3 (ref 0.7–3.1)
LYMPHOCYTES NFR BLD AUTO: 19.2 % (ref 19.6–45.3)
MAGNESIUM SERPL-MCNC: 1.3 MG/DL (ref 1.6–2.6)
MCH RBC QN AUTO: 29 PG (ref 26.6–33)
MCHC RBC AUTO-ENTMCNC: 30.7 G/DL (ref 31.5–35.7)
MCV RBC AUTO: 94.2 FL (ref 79–97)
METHGB BLD QL: 0.5 % (ref 0–1.5)
MODALITY: ABNORMAL
MONOCYTES # BLD AUTO: 0.43 10*3/MM3 (ref 0.1–0.9)
MONOCYTES NFR BLD AUTO: 7.6 % (ref 5–12)
NEUTROPHILS NFR BLD AUTO: 3.84 10*3/MM3 (ref 1.7–7)
NEUTROPHILS NFR BLD AUTO: 67.6 % (ref 42.7–76)
NRBC BLD AUTO-RTO: 0 /100 WBC (ref 0–0.2)
OXYHGB MFR BLDV: 97.5 % (ref 94–99)
PAW @ PEAK INSP FLOW SETTING VENT: 0 CMH2O
PCO2 BLDA: 42.6 MM HG (ref 35–45)
PCO2 TEMP ADJ BLD: 42.6 MM HG (ref 35–45)
PH BLDA: 7.36 PH UNITS (ref 7.35–7.45)
PH, TEMP CORRECTED: 7.36 PH UNITS
PLATELET # BLD AUTO: 167 10*3/MM3 (ref 140–450)
PMV BLD AUTO: 10.1 FL (ref 6–12)
PO2 BLDA: 131 MM HG (ref 83–108)
PO2 TEMP ADJ BLD: 131 MM HG (ref 83–108)
POTASSIUM SERPL-SCNC: 3.8 MMOL/L (ref 3.5–5.2)
RBC # BLD AUTO: 2.59 10*6/MM3 (ref 3.77–5.28)
SODIUM SERPL-SCNC: 137 MMOL/L (ref 136–145)
TOTAL RATE: 0 BREATHS/MINUTE
UFH PPP CHRO-ACNC: 0.43 IU/ML (ref 0.3–0.7)
WBC NRBC COR # BLD AUTO: 5.67 10*3/MM3 (ref 3.4–10.8)

## 2025-01-13 PROCEDURE — 82375 ASSAY CARBOXYHB QUANT: CPT

## 2025-01-13 PROCEDURE — 74018 RADEX ABDOMEN 1 VIEW: CPT

## 2025-01-13 PROCEDURE — 82948 REAGENT STRIP/BLOOD GLUCOSE: CPT

## 2025-01-13 PROCEDURE — 25010000002 HEPARIN (PORCINE) 25000-0.45 UT/250ML-% SOLUTION

## 2025-01-13 PROCEDURE — 63710000001 INSULIN REGULAR HUMAN PER 5 UNITS: Performed by: INTERNAL MEDICINE

## 2025-01-13 PROCEDURE — 83735 ASSAY OF MAGNESIUM: CPT | Performed by: INTERNAL MEDICINE

## 2025-01-13 PROCEDURE — 25010000002 LINEZOLID 600 MG/300ML SOLUTION: Performed by: INTERNAL MEDICINE

## 2025-01-13 PROCEDURE — 82805 BLOOD GASES W/O2 SATURATION: CPT

## 2025-01-13 PROCEDURE — 25810000003 SODIUM CHLORIDE 0.9 % SOLUTION 250 ML FLEX CONT: Performed by: INTERNAL MEDICINE

## 2025-01-13 PROCEDURE — 94799 UNLISTED PULMONARY SVC/PX: CPT

## 2025-01-13 PROCEDURE — 85520 HEPARIN ASSAY: CPT

## 2025-01-13 PROCEDURE — 99291 CRITICAL CARE FIRST HOUR: CPT | Performed by: INTERNAL MEDICINE

## 2025-01-13 PROCEDURE — 83050 HGB METHEMOGLOBIN QUAN: CPT

## 2025-01-13 PROCEDURE — 25010000002 BUMETANIDE PER 0.5 MG: Performed by: INTERNAL MEDICINE

## 2025-01-13 PROCEDURE — 25010000002 MAGNESIUM SULFATE 4 GM/100ML SOLUTION: Performed by: INTERNAL MEDICINE

## 2025-01-13 PROCEDURE — 25010000002 METHYLNALTREXONE 12 MG/0.6ML SOLUTION: Performed by: INTERNAL MEDICINE

## 2025-01-13 PROCEDURE — 25010000002 AMPICILLIN-SULBACTAM PER 1.5 G: Performed by: INTERNAL MEDICINE

## 2025-01-13 PROCEDURE — 63710000001 INSULIN REGULAR HUMAN PER 5 UNITS

## 2025-01-13 PROCEDURE — 94003 VENT MGMT INPAT SUBQ DAY: CPT

## 2025-01-13 PROCEDURE — 80048 BASIC METABOLIC PNL TOTAL CA: CPT | Performed by: INTERNAL MEDICINE

## 2025-01-13 PROCEDURE — 71045 X-RAY EXAM CHEST 1 VIEW: CPT

## 2025-01-13 PROCEDURE — 85025 COMPLETE CBC W/AUTO DIFF WBC: CPT | Performed by: INTERNAL MEDICINE

## 2025-01-13 PROCEDURE — 25010000002 FENTANYL 10 MCG/1 ML NS: Performed by: INTERNAL MEDICINE

## 2025-01-13 PROCEDURE — 63710000001 INSULIN GLARGINE PER 5 UNITS: Performed by: INTERNAL MEDICINE

## 2025-01-13 PROCEDURE — 25010000002 MAGNESIUM SULFATE 2 GM/50ML SOLUTION: Performed by: INTERNAL MEDICINE

## 2025-01-13 RX ORDER — NICOTINE POLACRILEX 4 MG
15 LOZENGE BUCCAL
Status: DISCONTINUED | OUTPATIENT
Start: 2025-01-13 | End: 2025-01-14

## 2025-01-13 RX ORDER — MAGNESIUM SULFATE HEPTAHYDRATE 40 MG/ML
2 INJECTION, SOLUTION INTRAVENOUS ONCE
Status: COMPLETED | OUTPATIENT
Start: 2025-01-13 | End: 2025-01-13

## 2025-01-13 RX ORDER — MAGNESIUM SULFATE HEPTAHYDRATE 40 MG/ML
4 INJECTION, SOLUTION INTRAVENOUS ONCE
Status: COMPLETED | OUTPATIENT
Start: 2025-01-13 | End: 2025-01-13

## 2025-01-13 RX ORDER — IBUPROFEN 600 MG/1
1 TABLET ORAL
Status: DISCONTINUED | OUTPATIENT
Start: 2025-01-13 | End: 2025-01-20

## 2025-01-13 RX ORDER — LINEZOLID 2 MG/ML
600 INJECTION, SOLUTION INTRAVENOUS 2 TIMES DAILY
Status: COMPLETED | OUTPATIENT
Start: 2025-01-13 | End: 2025-01-19

## 2025-01-13 RX ORDER — DEXTROSE MONOHYDRATE 25 G/50ML
10-50 INJECTION, SOLUTION INTRAVENOUS
Status: DISCONTINUED | OUTPATIENT
Start: 2025-01-13 | End: 2025-01-20

## 2025-01-13 RX ADMIN — INSULIN HUMAN 7.4 UNITS/HR: 1 INJECTION, SOLUTION INTRAVENOUS at 16:47

## 2025-01-13 RX ADMIN — DEXMEDETOMIDINE 1.2 MCG/KG/HR: 200 INJECTION, SOLUTION INTRAVENOUS at 07:09

## 2025-01-13 RX ADMIN — INSULIN GLARGINE 35 UNITS: 100 INJECTION, SOLUTION SUBCUTANEOUS at 09:03

## 2025-01-13 RX ADMIN — SENNOSIDES AND DOCUSATE SODIUM 2 TABLET: 50; 8.6 TABLET ORAL at 09:03

## 2025-01-13 RX ADMIN — LINEZOLID 600 MG: 600 INJECTION, SOLUTION INTRAVENOUS at 12:38

## 2025-01-13 RX ADMIN — Medication 150 MCG/HR: at 18:44

## 2025-01-13 RX ADMIN — SENNOSIDES AND DOCUSATE SODIUM 2 TABLET: 50; 8.6 TABLET ORAL at 20:55

## 2025-01-13 RX ADMIN — MAGNESIUM SULFATE IN WATER FOR 4 G: 40 INJECTION INTRAVENOUS at 11:27

## 2025-01-13 RX ADMIN — LINEZOLID 600 MG: 600 INJECTION, SOLUTION INTRAVENOUS at 20:55

## 2025-01-13 RX ADMIN — HEPARIN SODIUM 25 UNITS/KG/HR: 10000 INJECTION, SOLUTION INTRAVENOUS at 09:45

## 2025-01-13 RX ADMIN — HEPARIN SODIUM 25 UNITS/KG/HR: 10000 INJECTION, SOLUTION INTRAVENOUS at 03:11

## 2025-01-13 RX ADMIN — INSULIN HUMAN 10 UNITS/HR: 1 INJECTION, SOLUTION INTRAVENOUS at 21:16

## 2025-01-13 RX ADMIN — POLYETHYLENE GLYCOL 3350 17 G: 17 POWDER, FOR SOLUTION ORAL at 09:03

## 2025-01-13 RX ADMIN — CHLORHEXIDINE GLUCONATE 0.12% ORAL RINSE 15 ML: 1.2 LIQUID ORAL at 09:03

## 2025-01-13 RX ADMIN — NYSTATIN: 100000 POWDER TOPICAL at 20:55

## 2025-01-13 RX ADMIN — AMPICILLIN SODIUM, SULBACTAM SODIUM 3 G: 2; 1 INJECTION, POWDER, FOR SOLUTION INTRAMUSCULAR; INTRAVENOUS at 02:24

## 2025-01-13 RX ADMIN — INSULIN HUMAN 12 UNITS: 100 INJECTION, SOLUTION PARENTERAL at 05:03

## 2025-01-13 RX ADMIN — ACETAMINOPHEN 650 MG: 650 SOLUTION ORAL at 17:41

## 2025-01-13 RX ADMIN — NYSTATIN: 100000 POWDER TOPICAL at 09:04

## 2025-01-13 RX ADMIN — FAMOTIDINE 20 MG: 10 INJECTION, SOLUTION INTRAVENOUS at 20:55

## 2025-01-13 RX ADMIN — MAGNESIUM SULFATE HEPTAHYDRATE 2 G: 2 INJECTION, SOLUTION INTRAVENOUS at 15:09

## 2025-01-13 RX ADMIN — BUMETANIDE 2 MG: 0.25 INJECTION INTRAMUSCULAR; INTRAVENOUS at 17:41

## 2025-01-13 RX ADMIN — Medication 250 MCG/HR: at 07:09

## 2025-01-13 RX ADMIN — INSULIN HUMAN 10 UNITS: 100 INJECTION, SOLUTION PARENTERAL at 12:09

## 2025-01-13 RX ADMIN — INSULIN HUMAN 14 UNITS: 100 INJECTION, SOLUTION PARENTERAL at 12:09

## 2025-01-13 RX ADMIN — HEPARIN SODIUM 25 UNITS/KG/HR: 10000 INJECTION, SOLUTION INTRAVENOUS at 16:43

## 2025-01-13 RX ADMIN — ACETAMINOPHEN 650 MG: 650 SOLUTION ORAL at 09:20

## 2025-01-13 RX ADMIN — METHYLNALTREXONE BROMIDE 12 MG: 12 INJECTION, SOLUTION SUBCUTANEOUS at 09:03

## 2025-01-13 RX ADMIN — BUMETANIDE 2 MG: 0.25 INJECTION INTRAMUSCULAR; INTRAVENOUS at 05:04

## 2025-01-13 RX ADMIN — ACETAMINOPHEN 650 MG: 650 SOLUTION ORAL at 02:25

## 2025-01-13 RX ADMIN — DEXMEDETOMIDINE 1.2 MCG/KG/HR: 200 INJECTION, SOLUTION INTRAVENOUS at 01:27

## 2025-01-13 RX ADMIN — FAMOTIDINE 20 MG: 10 INJECTION, SOLUTION INTRAVENOUS at 09:03

## 2025-01-13 RX ADMIN — DEXMEDETOMIDINE 1 MCG/KG/HR: 200 INJECTION, SOLUTION INTRAVENOUS at 13:02

## 2025-01-13 RX ADMIN — DEXMEDETOMIDINE 1.2 MCG/KG/HR: 200 INJECTION, SOLUTION INTRAVENOUS at 18:44

## 2025-01-13 NOTE — PROGRESS NOTES
Intensive Care Follow-up     Hospital:  LOS: 11 days   Ms. Natalia Arzate, 39 y.o. female is followed for:   Acute respiratory failure with hypercapnia            History of present illness:    40yo F with a history of HLD, Hypothyroidism, Afib, PE/DVT, Factor V Leiden mutation, and stroke who presented to [facility] with altered mental status. Labs there were significant for renal failure, hyperglycemia, and hypercapnic respiratory failure. Imaging consistent with basilar pulmonary infiltrates concerning for pneumonia. She was intubated and required the initiation of vasopressors. She was transferred to [facility] on 1/2/25 for ongoing care.      Since arrival, she has continued to require mechanical ventilation along with vasopressors. She has been started on Vancomycin, Flagyl and Cefepime. Cultures are significant for MRSA in the sputum along with gemella sanguinis in the blood.      Nephrology has been following for MARIAA with poor urine output and hyperkalemia.      On the morning of 1/4/25, she developed worsening hypoxia despite an FiO2 of 100% and PEEP of 16. Imaging showed white out of the left lung. Bronchoscopy was performed with mucopurulent secretions suctioned from the left upper and left lower lobes. Repeat CXR after bronchoscopy showed some improvement in left upper lobe aeration with persistent left lower lobe disease vs pleural effusion    Patient has worsening renal failure and started on CRRT which eventually was transitioned to intermittent hemodialysis      Subjective   Interval History:  Overnight no acute events.  Patient walter on mechanical ventilation.  MRSA noted in respiratory cultures.  Will switch patient to Zyvox.  Not moving bowel movements and will get tapwater enema.  Change Urena catheter.  Ongoing fever spikes.  Cultures pending.                 The patient's past medical, surgical and social history were reviewed and updated in Epic as appropriate.       Objective  "    Infusions:  dexmedetomidine HCl (PRECEDEX) 1,000 mcg in sodium chloride 0.9 % 250 mL infusion, 0.2-1.5 mcg/kg/hr, Last Rate: 1.2 mcg/kg/hr (01/13/25 1400)  fentanyl 10 mcg/mL,  mcg/hr, Last Rate: 250 mcg/hr (01/13/25 0709)  heparin, 25 Units/kg/hr, Last Rate: 25 Units/kg/hr (01/13/25 0945)  insulin, 0-100 Units/hr  norepinephrine, 0.02-0.3 mcg/kg/min, Last Rate: Stopped (01/08/25 2156)  Pharmacy to Dose Heparin,   phenylephrine, 0.5-3 mcg/kg/min, Last Rate: Stopped (01/10/25 0040)      Medications:  bumetanide, 2 mg, Intravenous, Q12H  chlorhexidine, 15 mL, Mouth/Throat, Q12H  famotidine, 20 mg, Intravenous, BID  Linezolid, 600 mg, Intravenous, BID  methylnaltrexone, 12 mg, Subcutaneous, Every Other Day  nystatin, , Topical, Q12H  senna-docusate sodium, 2 tablet, Nasogastric, BID   And  polyethylene glycol, 17 g, Nasogastric, Daily        Vital Sign Min/Max for last 24 hours  Temp  Min: 100.9 °F (38.3 °C)  Max: 102.9 °F (39.4 °C)   BP  Min: 110/64  Max: 174/93   Pulse  Min: 82  Max: 129   Resp  Min: 24  Max: 32   SpO2  Min: 88 %  Max: 97 %   No data recorded       Input/Output for last 24 hour shift  01/12 0701 - 01/13 0700  In: 2690.8 [I.V.:2420.8]  Out: 3875 [Urine:3875]   Mode: VC+/AC  FiO2 (%):  [40 %-50 %] 40 %  S RR:  [24] 24  S VT:  [380 mL] 380 mL  PEEP/CPAP (cm H2O):  [8 cm H20] 8 cm H20  MAP (cm H2O):  [12-15] 13  Objective:  Vital signs: (most recent): Blood pressure 128/79, pulse 107, temperature (!) 101.1 °F (38.4 °C), temperature source Bladder, resp. rate 24, height 162.6 cm (64.02\"), weight (!) 165 kg (363 lb 4.8 oz), SpO2 92%, not currently breastfeeding.            General Appearance:  Not in distress, no asynchrony with mechanical ventilator.  Head:    Atraumatic, Normocephalic, Pupils reactive & symmetrical B/L.  Neck:  Endotracheal tube clean.   Lungs:   B/L Breath sounds present with decreased breath sounds on bases, no wheezing heard, occ crackles.   Heart: S1 and S2 present, no " murmur  Abdomen: Obese, soft, bowel sounds hypoactive  Extremities:   + edema, warm to touch.  Neurologic:  Pt sedated on the vent. Not able to participate in full neurological exam    Ventilator settings: A/C: FiO2 40 PEEP 8      Results from last 7 days   Lab Units 01/13/25  0546 01/12/25  0525 01/11/25  1239 01/11/25  0526   WBC 10*3/mm3 5.67 5.47  --  5.35   HEMOGLOBIN g/dL 7.5* 7.6* 7.9* 6.9*   PLATELETS 10*3/mm3 167 176  --  154     Results from last 7 days   Lab Units 01/13/25  0546 01/12/25  0525 01/11/25  1239 01/11/25  0837 01/10/25  2319 01/10/25  1617   SODIUM mmol/L 137 134* 138 137 132* 135*   POTASSIUM mmol/L 3.8 3.8 4.7 4.6 4.3 4.4   CO2 mmol/L 22.0 17.0* 11.0* 13.0* 14.0* 17.0*   BUN mg/dL 31* 24* 26* 24* 19 15   CREATININE mg/dL 1.48* 1.38* 1.35* 1.48* 1.12* 0.84   MAGNESIUM mg/dL 1.3* 1.9  --  2.2 2.3 2.4   PHOSPHORUS mg/dL  --   --   --  6.4* 6.1* 4.9*   GLUCOSE mg/dL 380* 239* 210* 143* 97 99     Estimated Creatinine Clearance: 80.4 mL/min (A) (by C-G formula based on SCr of 1.48 mg/dL (H)).    Results from last 7 days   Lab Units 01/13/25  0420   PH, ARTERIAL pH units 7.362   PCO2, ARTERIAL mm Hg 42.6   PO2 ART mm Hg 131.0*       Images:   Abdominal x-ray reviewed and Showed tube is possibly within the antrum of stomach.      Chest x-ray reviewed by me showed endotracheal tube in good position.  NG tube below the diaphragm.  Left base atelectasis/effusion persisting.      Patient's results and images.     Assessment & Plan   Impression        Acute respiratory failure with hypercapnia    MARIAA (acute kidney injury)    Type 2 diabetes mellitus with hyperglycemia    Sepsis       Plan        1.  Patient presented here with respiratory failure currently on mechanical ventilation.  Left base  pneumonia.  Found to have MRSA cultures.  Will switch to Zyvox and monitor closely.  1 out of 2 blood culture positive for coag negative staph.  Repeat cultures have been negative likely contaminant.  Continue to  provide supportive care.  2.  Continue heparin drip for history of atrial fibrillation and factor V Leiden with previous thromboembolic disease.  Seems to be tolerating well.  No acute bleed noted.  Hemoglobin low but stable.  3.  Renal function stable currently.  Avoid vancomycin given renal dysfunction.  Monitor urine output.  4.  No bowel movement.  Will go ahead and give tapwater enema.  Continue Relistor.  5.  Will place Keofeed and start enteral nutrition and slowly advance.  6.  Blood sugars are not under good control.  We will go ahead and place patient on insulin infusion to get better blood glucose control.  7.  GI prophylaxis.  8.  Light sedation for comfort and ventilator synchrony.  9.  Changes outside Urena catheter.  Central line without any evidence of infection currently.  If continues to have fever spikes we will plan on changing out the lines tomorrow.    Continue supportive care.  High risk of decline.    Plan of care and goals reviewed with multidisciplinary/antibiotic stewardship team during rounds.   I discussed the patient's findings and my recommendations with family and nursing staff     Time spent Critical care 32 min (exclusive of procedure time)  including high complexity decision making to assess, manipulate, and support vital organ system failure in this individual who has impairment of one or more vital organ systems such that there is a high probability of imminent or life threatening deterioration in the patient’s condition.      Vince Toscano MD, [facility]  Pulmonary, Critical care and Sleep Medicine

## 2025-01-13 NOTE — CASE MANAGEMENT/SOCIAL WORK
Continued Stay Note  [facility]     Patient Name: Natalia Arzate  MRN: [id number]  Today's Date: 1/13/2025    Admit Date: 1/2/2025    Plan: ongoing   Discharge Plan       Row Name 01/13/25 1056       Plan    Plan ongoing    Patient/Family in Agreement with Plan other (see comments)    Plan Comments Discussed in MDR, remains intubated/sedated on vent. CRRT started 1/8, stopped 1/11, daily eval for HD started 1/11. Febrile, antibiotics changed to Zyvox, SBT today. D/C TBD. CM will continue to follow.                   Discharge Codes    No documentation.                 Expected Discharge Date and Time       Expected Discharge Date Expected Discharge Time    Jan 17, 2025               Rj Valle RN

## 2025-01-13 NOTE — PROGRESS NOTES
Pharmacy to Dose Heparin Infusion Note  Natalia Arzate is a 39 y.o. female receiving heparin infusion.     Therapy for (VTE/Cardiac): VTE  Patient Weight: 159 kg  Initial Bolus (Y/N): No  Any Bolus (Y/N): Yes  Signs or Symptoms of Bleeding: None currently - has been held previously in admission for dropping H/H.     VTE (PE/DVT)  Initial rate: 18 units/kg/hr (Max 1,500 units/hr)    Anti-Xa Bolus   Dose Infusion Hold   Time Infusion Rate Change (units/kg/hr) Repeat  Anti-Xa   < 0.11 50 units/kg  (4000 units Max) None Increase by  4 units/kg/hr 6 hours   0.11 - 0.19 25 units/kg  (2000 units Max) None Increase by  3 units/kg/hr 6 hours   0.2 - 0.29 0 None Increase by  2 units/kg/hr 6 hours   0.3 - 0.7 0 None No Change 6 hours (after 2 consecutive levels in range check qAM)   0.71 - 0.8 0 None Decrease by  1 units/kg/hr 6 hours   0.81 - 0.9 0 None Decrease by  2 units/kg/hr 6 hours   0.91 - 1 0 60 minutes Decrease by  3 units/kg/hr 6 hours   >1 0 Hold  After Anti-Xa less than 0.7 decrease previous rate by  4 units/kg/hr  Every 2 hours until Anti-Xa less than 0.7 then when infusion restarts in 6 hours     Results from last 7 days   Lab Units 01/13/25  0546 01/12/25  0525 01/11/25  1239 01/11/25  0526 01/06/25  1956 01/06/25  1319   INR   --   --   --   --   --  1.24*   HEMOGLOBIN g/dL 7.5* 7.6* 7.9* 6.9*   < >  --    HEMATOCRIT % 24.4* 25.1* 26.6* 23.7*   < >  --    PLATELETS 10*3/mm3 167 176  --  154   < >  --     < > = values in this interval not displayed.       Date   Time   Anti-Xa Current Rate (units/kg/hr) Bolus   (units) Rate Change   (units/kg/hr) New Rate (units/kg/hr) Repeat  Anti-Xa Comments /  Pump Check    1/2 1500 pending -- -- +9 9 0000 D/w RN; will await labs, but expect this to be an aPTT assay    1/2 N/a N/a 9 -- -- 9 2100 Baseline anti-Xa 0.1, re-timed next check for 2100.    1/2 2021 0.1 9 4000 +4 13 0400 DW RN   1/3 0500 0.10 13 4000 +4 17 1200 D/w RN   1/3 1200 0.19 17 4000 +1 18 2000 D/w RN    1/3 2035 0.23 18 -- +2 20 0400 D/w RN   1/4 0545 0.23 18 -- +2 22 1200 D/w RN     1/4   0815   HOLD   22   HOLD   HOLD   HOLD   HOLD D/w ROB Kinsey & APRN; hold heparin drip for now 2/2 H&H dropping; no overt bleeding noted;   MAR placeholder entered.   1/6 1300 pending -- -- +9 9 2000 D/w Dr. De Jesus and ROB Kinsey. Restart heparin gtt. Will not give initial bolus given concern for dropping hgb. Ok for future boluses.     1/6 1956 0.10 9 4000 +4 13 0600 DW RN. Continue watching hgb, currently stable   1/7 0600 0.10 13 4000 +4 17 1200 D/w RN   1/7 1204 0.10 17 4000 +4 21 2000 D/w ROB [place]    1/7 2023 0.17 21 2000 +3 24 0600 D/w ROB Burns   1/8 0650 0.23 24 -- +1 26 1200 D/w RN   1/8 1250 0.31 26 -- -- 26 2000 D/w RN   1/8 2030 0.27 26 -- +2 28 0300 D/w ROB Burns   1/9 0300 0.59 28 -- -- 28 1000 D/w RN   1/9 0902 0.87 28 -- -2 26 1500 DW ROB Kinsey. H/H stable    1/9 1547 0.71 26 -- -1 25 2300 MYRA Kinsey   1/10 0003 0.61 25 -- -- 25 0600 Carlos Alberto [facility]   1/10 0515 0.62 25 -- -- 25 AM labs [address]   1/11 0526 0.40 25 -- -- 25 AM labs [place] [id number] [facility]   1/12 0525 0.31 25 -- -- 25 AM labs [place] [id number] [facility]   1/13 0546 0.43 25 -- -- 25 AM Labs  ROB                                    Thank you,  Alecia Ingram, PharmD   01/13/25  07:05 EST

## 2025-01-13 NOTE — PLAN OF CARE
Goal Outcome Evaluation:  Plan of Care Reviewed With: patient, parent        Progress: no change    Pt remains intubated and sedated, on fentanyl and precedex, opens eyes to voice, grimaces but does not follow commands, mag replaced, Keofeed advanced, post pyloric, third portion of duodenum, tap water enema administered, BM x 1, 's, insulin gtt started, T max 102.2, prn tylenol administered, F/C changed, vancomycin changed to zyvox, Bumex continues, UOP 2.6 L,no HD for now, mother updated via phone

## 2025-01-13 NOTE — PLAN OF CARE
Goal Outcome Evaluation:  Plan of Care Reviewed With: patient        Progress: improving  Outcome Evaluation: Early in shift pt noted to be agitated, tachypneic, hypertensive and tachycardic.  Fentanyl increased to what is now 250 mcg/hr and precedex increased to 1.2 mcg/kg/hr.  This promoted ventilator compliance and pt now only slightly overbreathing ventilator at a rate of 25-26.  An esophageal temperature probe was placed for accurate monitoring of core temp and it was noted that temp was 102.9.  Tylenol has been given twice and ice packs have been placed along with a tepid bath given.  Has not produced stool yet, though is passing flatus and has normoactive bowel sounds.  Pt continues to be hyperglycemic despite administration of lantus with sugars consistently over 300.  Heavy UOP this shift.

## 2025-01-13 NOTE — PROGRESS NOTES
"   LOS: 11 days    Patient Care Team:  Bladimir Calle PA-C as PCP - General (Physician Assistant)    Subjective     No new events    Objective     Vital Signs:  Blood pressure 141/84, pulse 94, temperature (!) 102.2 °F (39 °C), temperature source Axillary, resp. rate 24, height 162.6 cm (64.02\"), weight (!) 165 kg (363 lb 4.8 oz), SpO2 96%, not currently breastfeeding.      Intake/Output Summary (Last 24 hours) at 1/13/2025 1129  Last data filed at 1/13/2025 0912  Gross per 24 hour   Intake 2780.4 ml   Output 4275 ml   Net -1494.6 ml        01/12 0701 - 01/13 0700  In: 2690.8 [I.V.:2420.8]  Out: 3875 [Urine:3875]    Physical Exam:        GENERAL: WD morbidly obese WF NAD  NEURO: Sedate on vent   .PSYCHIATRIC: Unable to evaluate  EYE: PE, no icterus, no conjunctivitis  ENT: + ET tube  NECK: No JVD discernable,  Trachea midline  CV: Trace edema, RRR  LUNGS:  Quiet,  Nonlabored resp.  Symmetrical expansion on vent  ABDOMEN: Nondistended, soft nontender.  : + Urena, no palp bladder  SKIN: Warm and dry without rash  + Temp HD cath.    Labs:  Results from last 7 days   Lab Units 01/13/25  0546 01/12/25  0525 01/11/25  1239 01/11/25  0526   WBC 10*3/mm3 5.67 5.47  --  5.35   HEMOGLOBIN g/dL 7.5* 7.6* 7.9* 6.9*   PLATELETS 10*3/mm3 167 176  --  154     Results from last 7 days   Lab Units 01/13/25  0546 01/12/25  0525 01/11/25  1239 01/11/25  0837 01/10/25  2319 01/10/25  1617 01/10/25  0842   SODIUM mmol/L 137 134* 138 137 132* 135* 134*   POTASSIUM mmol/L 3.8 3.8 4.7 4.6 4.3 4.4 4.1   CHLORIDE mmol/L 94* 91* 96* 99 95* 99 97*   CO2 mmol/L 22.0 17.0* 11.0* 13.0* 14.0* 17.0* 20.0*   BUN mg/dL 31* 24* 26* 24* 19 15 16   CREATININE mg/dL 1.48* 1.38* 1.35* 1.48* 1.12* 0.84 0.77   CALCIUM mg/dL 9.2 9.3 9.7 9.2 8.9 8.9 8.7   PHOSPHORUS mg/dL  --   --   --  6.4* 6.1* 4.9* 5.1*   MAGNESIUM mg/dL 1.3* 1.9  --  2.2 2.3 2.4 2.3  2.3   ALBUMIN g/dL  --   --   --  3.2* 3.1* 3.4* 3.2*     Results from last 7 days   Lab Units " 01/10/25  0842   ALK PHOS U/L 212*   BILIRUBIN mg/dL 0.2   ALT (SGPT) U/L 8   AST (SGOT) U/L 22     Results from last 7 days   Lab Units 01/13/25  0420   PH, ARTERIAL pH units 7.362   PO2 ART mm Hg 131.0*   PCO2, ARTERIAL mm Hg 42.6   HCO3 ART mmol/L 24.2               Estimated Creatinine Clearance: 80.4 mL/min (A) (by C-G formula based on SCr of 1.48 mg/dL (H)).         A/P:      1-MARIAA:-.  Creatinine improving.  Patient remains nonoliguric.  Pre-renal azotemia vs sepsis related MARIAA. UA - RBC 3-5, + protein. 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 At home on lisinopril, metformin and topiramate. Initially was improving with IV hydration and hemodynamic support but later developed worsening resp status and volume overload.   Required  CRRT ~ 1/8/25-1/11/25.  For now continue supportive care.  No further need for HD at this time.  May be able to DC HD cath if remains off dialysis.    2- Shock- Off pressor. Hemodynamics improved after aggressive UF with CRRT    3-  Hyperkalemia -resolved.  Management with CRRT    4- Severe met acidosis: Improving. .    5- Anemia- Transfuse at hB<7    6-volume: Negative overnight with nonoliguric urine output.  Monitor for now.    7-Respiratory distress - suspicion of aspiration pneumonia. Intubated on MV.    High risk and complexity patient.    Bakari Osullivan MD  01/13/25  11:29 EST

## 2025-01-14 ENCOUNTER — APPOINTMENT (OUTPATIENT)
Dept: GENERAL RADIOLOGY | Facility: HOSPITAL | Age: 40
DRG: 870 | End: 2025-01-14
Payer: COMMERCIAL

## 2025-01-14 LAB
ALBUMIN SERPL-MCNC: 3.5 G/DL (ref 3.5–5.2)
ALBUMIN/GLOB SERPL: 0.8 G/DL
ALP SERPL-CCNC: 207 U/L (ref 39–117)
ALT SERPL W P-5'-P-CCNC: 9 U/L (ref 1–33)
ANION GAP SERPL CALCULATED.3IONS-SCNC: 11 MMOL/L (ref 5–15)
ARTERIAL PATENCY WRIST A: ABNORMAL
AST SERPL-CCNC: 22 U/L (ref 1–32)
ATMOSPHERIC PRESS: ABNORMAL MM[HG]
BASE EXCESS BLDA CALC-SCNC: 7.7 MMOL/L (ref 0–2)
BASOPHILS # BLD AUTO: 0.02 10*3/MM3 (ref 0–0.2)
BASOPHILS NFR BLD AUTO: 0.2 % (ref 0–1.5)
BDY SITE: ABNORMAL
BILIRUB SERPL-MCNC: 0.3 MG/DL (ref 0–1.2)
BODY TEMPERATURE: 37
BUN SERPL-MCNC: 32 MG/DL (ref 6–20)
BUN/CREAT SERPL: 28.1 (ref 7–25)
CALCIUM SPEC-SCNC: 10 MG/DL (ref 8.6–10.5)
CHLORIDE SERPL-SCNC: 98 MMOL/L (ref 98–107)
CO2 BLDA-SCNC: 33.5 MMOL/L (ref 22–33)
CO2 SERPL-SCNC: 33 MMOL/L (ref 22–29)
COHGB MFR BLD: 1.8 % (ref 0–2)
CREAT SERPL-MCNC: 1.14 MG/DL (ref 0.57–1)
DEPRECATED RDW RBC AUTO: 52.1 FL (ref 37–54)
EGFRCR SERPLBLD CKD-EPI 2021: 63.3 ML/MIN/1.73
EOSINOPHIL # BLD AUTO: 0.09 10*3/MM3 (ref 0–0.4)
EOSINOPHIL NFR BLD AUTO: 1 % (ref 0.3–6.2)
EPAP: 0
ERYTHROCYTE [DISTWIDTH] IN BLOOD BY AUTOMATED COUNT: 15.9 % (ref 12.3–15.4)
GLOBULIN UR ELPH-MCNC: 4.4 GM/DL
GLUCOSE BLDC GLUCOMTR-MCNC: 132 MG/DL (ref 70–130)
GLUCOSE BLDC GLUCOMTR-MCNC: 132 MG/DL (ref 70–130)
GLUCOSE BLDC GLUCOMTR-MCNC: 137 MG/DL (ref 70–130)
GLUCOSE BLDC GLUCOMTR-MCNC: 137 MG/DL (ref 70–130)
GLUCOSE BLDC GLUCOMTR-MCNC: 142 MG/DL (ref 70–130)
GLUCOSE BLDC GLUCOMTR-MCNC: 142 MG/DL (ref 70–130)
GLUCOSE BLDC GLUCOMTR-MCNC: 146 MG/DL (ref 70–130)
GLUCOSE BLDC GLUCOMTR-MCNC: 153 MG/DL (ref 70–130)
GLUCOSE BLDC GLUCOMTR-MCNC: 158 MG/DL (ref 70–130)
GLUCOSE BLDC GLUCOMTR-MCNC: 160 MG/DL (ref 70–130)
GLUCOSE BLDC GLUCOMTR-MCNC: 162 MG/DL (ref 70–130)
GLUCOSE BLDC GLUCOMTR-MCNC: 174 MG/DL (ref 70–130)
GLUCOSE BLDC GLUCOMTR-MCNC: 175 MG/DL (ref 70–130)
GLUCOSE BLDC GLUCOMTR-MCNC: 180 MG/DL (ref 70–130)
GLUCOSE BLDC GLUCOMTR-MCNC: 181 MG/DL (ref 70–130)
GLUCOSE BLDC GLUCOMTR-MCNC: 182 MG/DL (ref 70–130)
GLUCOSE BLDC GLUCOMTR-MCNC: 189 MG/DL (ref 70–130)
GLUCOSE BLDC GLUCOMTR-MCNC: 194 MG/DL (ref 70–130)
GLUCOSE BLDC GLUCOMTR-MCNC: 211 MG/DL (ref 70–130)
GLUCOSE BLDC GLUCOMTR-MCNC: 240 MG/DL (ref 70–130)
GLUCOSE BLDC GLUCOMTR-MCNC: 268 MG/DL (ref 70–130)
GLUCOSE BLDC GLUCOMTR-MCNC: 340 MG/DL (ref 70–130)
GLUCOSE SERPL-MCNC: 198 MG/DL (ref 65–99)
HCO3 BLDA-SCNC: 32.1 MMOL/L (ref 20–26)
HCT VFR BLD AUTO: 24.9 % (ref 34–46.6)
HCT VFR BLD CALC: 24.1 % (ref 38–51)
HGB BLD-MCNC: 7.9 G/DL (ref 12–15.9)
HGB BLDA-MCNC: 7.9 G/DL (ref 14–18)
IMM GRANULOCYTES # BLD AUTO: 0.1 10*3/MM3 (ref 0–0.05)
IMM GRANULOCYTES NFR BLD AUTO: 1.2 % (ref 0–0.5)
INHALED O2 CONCENTRATION: 40 %
IPAP: 0
LYMPHOCYTES # BLD AUTO: 1.2 10*3/MM3 (ref 0.7–3.1)
LYMPHOCYTES NFR BLD AUTO: 13.9 % (ref 19.6–45.3)
MAGNESIUM SERPL-MCNC: 1.2 MG/DL (ref 1.6–2.6)
MAGNESIUM SERPL-MCNC: 1.8 MG/DL (ref 1.6–2.6)
MCH RBC QN AUTO: 29.3 PG (ref 26.6–33)
MCHC RBC AUTO-ENTMCNC: 31.7 G/DL (ref 31.5–35.7)
MCV RBC AUTO: 92.2 FL (ref 79–97)
METHGB BLD QL: 0.4 % (ref 0–1.5)
MODALITY: ABNORMAL
MONOCYTES # BLD AUTO: 0.5 10*3/MM3 (ref 0.1–0.9)
MONOCYTES NFR BLD AUTO: 5.8 % (ref 5–12)
NEUTROPHILS NFR BLD AUTO: 6.75 10*3/MM3 (ref 1.7–7)
NEUTROPHILS NFR BLD AUTO: 77.9 % (ref 42.7–76)
NRBC BLD AUTO-RTO: 0 /100 WBC (ref 0–0.2)
OXYHGB MFR BLDV: 93.7 % (ref 94–99)
PAW @ PEAK INSP FLOW SETTING VENT: 0 CMH2O
PCO2 BLDA: 44.2 MM HG (ref 35–45)
PCO2 TEMP ADJ BLD: 44.2 MM HG (ref 35–45)
PEEP RESPIRATORY: 8 CM[H2O]
PH BLDA: 7.47 PH UNITS (ref 7.35–7.45)
PH, TEMP CORRECTED: 7.47 PH UNITS
PHOSPHATE SERPL-MCNC: 2.9 MG/DL (ref 2.5–4.5)
PLATELET # BLD AUTO: 160 10*3/MM3 (ref 140–450)
PMV BLD AUTO: 9.8 FL (ref 6–12)
PO2 BLDA: 71.5 MM HG (ref 83–108)
PO2 TEMP ADJ BLD: 71.5 MM HG (ref 83–108)
POTASSIUM SERPL-SCNC: 2.8 MMOL/L (ref 3.5–5.2)
POTASSIUM SERPL-SCNC: 2.8 MMOL/L (ref 3.5–5.2)
PROT SERPL-MCNC: 7.9 G/DL (ref 6–8.5)
RBC # BLD AUTO: 2.7 10*6/MM3 (ref 3.77–5.28)
SODIUM SERPL-SCNC: 142 MMOL/L (ref 136–145)
TOTAL RATE: 24 BREATHS/MINUTE
UFH PPP CHRO-ACNC: 0.54 IU/ML (ref 0.3–0.7)
VENTILATOR MODE: ABNORMAL
VT ON VENT VENT: 0.38 ML
WBC NRBC COR # BLD AUTO: 8.66 10*3/MM3 (ref 3.4–10.8)

## 2025-01-14 PROCEDURE — 25010000002 HEPARIN (PORCINE) 25000-0.45 UT/250ML-% SOLUTION

## 2025-01-14 PROCEDURE — C1751 CATH, INF, PER/CENT/MIDLINE: HCPCS

## 2025-01-14 PROCEDURE — 25010000002 BUMETANIDE PER 0.5 MG: Performed by: INTERNAL MEDICINE

## 2025-01-14 PROCEDURE — 87147 CULTURE TYPE IMMUNOLOGIC: CPT | Performed by: INTERNAL MEDICINE

## 2025-01-14 PROCEDURE — 94799 UNLISTED PULMONARY SVC/PX: CPT

## 2025-01-14 PROCEDURE — 25010000002 CEFEPIME PER 500 MG: Performed by: INTERNAL MEDICINE

## 2025-01-14 PROCEDURE — 87077 CULTURE AEROBIC IDENTIFY: CPT | Performed by: INTERNAL MEDICINE

## 2025-01-14 PROCEDURE — 25010000002 LINEZOLID 600 MG/300ML SOLUTION: Performed by: INTERNAL MEDICINE

## 2025-01-14 PROCEDURE — 87186 SC STD MICRODIL/AGAR DIL: CPT | Performed by: INTERNAL MEDICINE

## 2025-01-14 PROCEDURE — 87070 CULTURE OTHR SPECIMN AEROBIC: CPT | Performed by: INTERNAL MEDICINE

## 2025-01-14 PROCEDURE — 94003 VENT MGMT INPAT SUBQ DAY: CPT

## 2025-01-14 PROCEDURE — 83735 ASSAY OF MAGNESIUM: CPT | Performed by: INTERNAL MEDICINE

## 2025-01-14 PROCEDURE — 0B978ZZ DRAINAGE OF LEFT MAIN BRONCHUS, VIA NATURAL OR ARTIFICIAL OPENING ENDOSCOPIC: ICD-10-PCS | Performed by: INTERNAL MEDICINE

## 2025-01-14 PROCEDURE — 87154 CUL TYP ID BLD PTHGN 6+ TRGT: CPT | Performed by: INTERNAL MEDICINE

## 2025-01-14 PROCEDURE — 25010000002 POTASSIUM CHLORIDE PER 2 MEQ

## 2025-01-14 PROCEDURE — 25010000002 FENTANYL 10 MCG/1 ML NS: Performed by: INTERNAL MEDICINE

## 2025-01-14 PROCEDURE — C1894 INTRO/SHEATH, NON-LASER: HCPCS

## 2025-01-14 PROCEDURE — 85025 COMPLETE CBC W/AUTO DIFF WBC: CPT | Performed by: INTERNAL MEDICINE

## 2025-01-14 PROCEDURE — 25010000002 MAGNESIUM SULFATE 2 GM/50ML SOLUTION: Performed by: INTERNAL MEDICINE

## 2025-01-14 PROCEDURE — 87040 BLOOD CULTURE FOR BACTERIA: CPT | Performed by: INTERNAL MEDICINE

## 2025-01-14 PROCEDURE — 25810000003 SODIUM CHLORIDE 0.9 % SOLUTION 250 ML FLEX CONT: Performed by: INTERNAL MEDICINE

## 2025-01-14 PROCEDURE — 85520 HEPARIN ASSAY: CPT

## 2025-01-14 PROCEDURE — 25010000002 MIDAZOLAM PER 1 MG: Performed by: INTERNAL MEDICINE

## 2025-01-14 PROCEDURE — 82805 BLOOD GASES W/O2 SATURATION: CPT

## 2025-01-14 PROCEDURE — 31645 BRNCHSC W/THER ASPIR 1ST: CPT | Performed by: INTERNAL MEDICINE

## 2025-01-14 PROCEDURE — 84132 ASSAY OF SERUM POTASSIUM: CPT

## 2025-01-14 PROCEDURE — 87205 SMEAR GRAM STAIN: CPT | Performed by: INTERNAL MEDICINE

## 2025-01-14 PROCEDURE — 80053 COMPREHEN METABOLIC PANEL: CPT | Performed by: INTERNAL MEDICINE

## 2025-01-14 PROCEDURE — 82948 REAGENT STRIP/BLOOD GLUCOSE: CPT

## 2025-01-14 PROCEDURE — 99291 CRITICAL CARE FIRST HOUR: CPT | Performed by: INTERNAL MEDICINE

## 2025-01-14 PROCEDURE — 25010000002 POTASSIUM CHLORIDE PER 2 MEQ: Performed by: INTERNAL MEDICINE

## 2025-01-14 PROCEDURE — 82375 ASSAY CARBOXYHB QUANT: CPT

## 2025-01-14 PROCEDURE — 84100 ASSAY OF PHOSPHORUS: CPT | Performed by: INTERNAL MEDICINE

## 2025-01-14 PROCEDURE — 02HV33Z INSERTION OF INFUSION DEVICE INTO SUPERIOR VENA CAVA, PERCUTANEOUS APPROACH: ICD-10-PCS

## 2025-01-14 PROCEDURE — 71045 X-RAY EXAM CHEST 1 VIEW: CPT

## 2025-01-14 PROCEDURE — 83050 HGB METHEMOGLOBIN QUAN: CPT

## 2025-01-14 RX ORDER — MAGNESIUM SULFATE HEPTAHYDRATE 40 MG/ML
2 INJECTION, SOLUTION INTRAVENOUS
Status: COMPLETED | OUTPATIENT
Start: 2025-01-14 | End: 2025-01-14

## 2025-01-14 RX ORDER — POTASSIUM CHLORIDE 29.8 MG/ML
20 INJECTION INTRAVENOUS ONCE
Status: COMPLETED | OUTPATIENT
Start: 2025-01-15 | End: 2025-01-15

## 2025-01-14 RX ORDER — BUMETANIDE 0.25 MG/ML
2 INJECTION, SOLUTION INTRAMUSCULAR; INTRAVENOUS 2 TIMES DAILY
Status: DISCONTINUED | OUTPATIENT
Start: 2025-01-15 | End: 2025-01-15

## 2025-01-14 RX ORDER — SODIUM CHLORIDE 0.9 % (FLUSH) 0.9 %
10 SYRINGE (ML) INJECTION EVERY 12 HOURS SCHEDULED
Status: DISCONTINUED | OUTPATIENT
Start: 2025-01-14 | End: 2025-01-15

## 2025-01-14 RX ORDER — SODIUM CHLORIDE 0.9 % (FLUSH) 0.9 %
20 SYRINGE (ML) INJECTION AS NEEDED
Status: DISCONTINUED | OUTPATIENT
Start: 2025-01-14 | End: 2025-02-03 | Stop reason: HOSPADM

## 2025-01-14 RX ORDER — POTASSIUM CHLORIDE 29.8 MG/ML
20 INJECTION INTRAVENOUS
Status: COMPLETED | OUTPATIENT
Start: 2025-01-14 | End: 2025-01-14

## 2025-01-14 RX ORDER — SODIUM CHLORIDE 9 MG/ML
40 INJECTION, SOLUTION INTRAVENOUS AS NEEDED
Status: DISCONTINUED | OUTPATIENT
Start: 2025-01-14 | End: 2025-01-15

## 2025-01-14 RX ORDER — MIDAZOLAM HYDROCHLORIDE 1 MG/ML
2 INJECTION, SOLUTION INTRAMUSCULAR; INTRAVENOUS
Status: DISCONTINUED | OUTPATIENT
Start: 2025-01-14 | End: 2025-02-03 | Stop reason: HOSPADM

## 2025-01-14 RX ORDER — MAGNESIUM SULFATE HEPTAHYDRATE 40 MG/ML
2 INJECTION, SOLUTION INTRAVENOUS ONCE
Status: COMPLETED | OUTPATIENT
Start: 2025-01-15 | End: 2025-01-15

## 2025-01-14 RX ORDER — SODIUM CHLORIDE 0.9 % (FLUSH) 0.9 %
10 SYRINGE (ML) INJECTION AS NEEDED
Status: DISCONTINUED | OUTPATIENT
Start: 2025-01-14 | End: 2025-02-03 | Stop reason: HOSPADM

## 2025-01-14 RX ADMIN — NYSTATIN: 100000 POWDER TOPICAL at 21:16

## 2025-01-14 RX ADMIN — ACETAMINOPHEN 650 MG: 650 SOLUTION ORAL at 00:30

## 2025-01-14 RX ADMIN — DEXMEDETOMIDINE 1.4 MCG/KG/HR: 200 INJECTION, SOLUTION INTRAVENOUS at 00:40

## 2025-01-14 RX ADMIN — INSULIN HUMAN 15.9 UNITS/HR: 1 INJECTION, SOLUTION INTRAVENOUS at 22:43

## 2025-01-14 RX ADMIN — CHLORHEXIDINE GLUCONATE 0.12% ORAL RINSE 15 ML: 1.2 LIQUID ORAL at 08:01

## 2025-01-14 RX ADMIN — MAGNESIUM SULFATE HEPTAHYDRATE 2 G: 2 INJECTION, SOLUTION INTRAVENOUS at 17:22

## 2025-01-14 RX ADMIN — POLYETHYLENE GLYCOL 3350 17 G: 17 POWDER, FOR SOLUTION ORAL at 08:01

## 2025-01-14 RX ADMIN — SENNOSIDES AND DOCUSATE SODIUM 2 TABLET: 50; 8.6 TABLET ORAL at 21:16

## 2025-01-14 RX ADMIN — MAGNESIUM SULFATE HEPTAHYDRATE 2 G: 2 INJECTION, SOLUTION INTRAVENOUS at 19:15

## 2025-01-14 RX ADMIN — INSULIN HUMAN 13.4 UNITS/HR: 1 INJECTION, SOLUTION INTRAVENOUS at 17:40

## 2025-01-14 RX ADMIN — BUMETANIDE 2 MG: 0.25 INJECTION INTRAMUSCULAR; INTRAVENOUS at 05:34

## 2025-01-14 RX ADMIN — POTASSIUM CHLORIDE 20 MEQ: 29.8 INJECTION, SOLUTION INTRAVENOUS at 09:38

## 2025-01-14 RX ADMIN — NYSTATIN: 100000 POWDER TOPICAL at 08:01

## 2025-01-14 RX ADMIN — HEPARIN SODIUM 25 UNITS/KG/HR: 10000 INJECTION, SOLUTION INTRAVENOUS at 00:17

## 2025-01-14 RX ADMIN — INSULIN HUMAN 15.8 UNITS/HR: 1 INJECTION, SOLUTION INTRAVENOUS at 03:40

## 2025-01-14 RX ADMIN — CHLORHEXIDINE GLUCONATE 0.12% ORAL RINSE 15 ML: 1.2 LIQUID ORAL at 00:16

## 2025-01-14 RX ADMIN — FAMOTIDINE 20 MG: 10 INJECTION, SOLUTION INTRAVENOUS at 21:17

## 2025-01-14 RX ADMIN — CEFEPIME 2000 MG: 2 INJECTION, POWDER, FOR SOLUTION INTRAVENOUS at 15:45

## 2025-01-14 RX ADMIN — INSULIN HUMAN 9.8 UNITS/HR: 1 INJECTION, SOLUTION INTRAVENOUS at 11:13

## 2025-01-14 RX ADMIN — Medication 175 MCG/HR: at 08:25

## 2025-01-14 RX ADMIN — FAMOTIDINE 20 MG: 10 INJECTION, SOLUTION INTRAVENOUS at 08:01

## 2025-01-14 RX ADMIN — SENNOSIDES AND DOCUSATE SODIUM 2 TABLET: 50; 8.6 TABLET ORAL at 08:01

## 2025-01-14 RX ADMIN — MIDAZOLAM HYDROCHLORIDE 2 MG: 1 INJECTION, SOLUTION INTRAMUSCULAR; INTRAVENOUS at 15:06

## 2025-01-14 RX ADMIN — DEXMEDETOMIDINE 1.4 MCG/KG/HR: 200 INJECTION, SOLUTION INTRAVENOUS at 10:52

## 2025-01-14 RX ADMIN — DEXMEDETOMIDINE 1.5 MCG/KG/HR: 200 INJECTION, SOLUTION INTRAVENOUS at 19:59

## 2025-01-14 RX ADMIN — POTASSIUM CHLORIDE 20 MEQ: 29.8 INJECTION, SOLUTION INTRAVENOUS at 19:01

## 2025-01-14 RX ADMIN — LINEZOLID 600 MG: 600 INJECTION, SOLUTION INTRAVENOUS at 21:16

## 2025-01-14 RX ADMIN — DEXMEDETOMIDINE 1.5 MCG/KG/HR: 200 INJECTION, SOLUTION INTRAVENOUS at 15:46

## 2025-01-14 RX ADMIN — POTASSIUM CHLORIDE 20 MEQ: 29.8 INJECTION, SOLUTION INTRAVENOUS at 18:01

## 2025-01-14 RX ADMIN — POTASSIUM CHLORIDE 20 MEQ: 29.8 INJECTION, SOLUTION INTRAVENOUS at 07:55

## 2025-01-14 RX ADMIN — Medication 300 MCG/HR: at 17:40

## 2025-01-14 RX ADMIN — HEPARIN SODIUM 25 UNITS/KG/HR: 10000 INJECTION, SOLUTION INTRAVENOUS at 06:47

## 2025-01-14 RX ADMIN — MIDAZOLAM HYDROCHLORIDE 2 MG: 1 INJECTION, SOLUTION INTRAMUSCULAR; INTRAVENOUS at 21:23

## 2025-01-14 RX ADMIN — CEFEPIME 2000 MG: 2 INJECTION, POWDER, FOR SOLUTION INTRAVENOUS at 21:16

## 2025-01-14 RX ADMIN — DEXMEDETOMIDINE 1.4 MCG/KG/HR: 200 INJECTION, SOLUTION INTRAVENOUS at 05:34

## 2025-01-14 RX ADMIN — MAGNESIUM SULFATE HEPTAHYDRATE 2 G: 2 INJECTION, SOLUTION INTRAVENOUS at 21:16

## 2025-01-14 RX ADMIN — HEPARIN SODIUM 25 UNITS/KG/HR: 10000 INJECTION, SOLUTION INTRAVENOUS at 22:30

## 2025-01-14 RX ADMIN — POTASSIUM CHLORIDE 20 MEQ: 29.8 INJECTION, SOLUTION INTRAVENOUS at 06:27

## 2025-01-14 RX ADMIN — HEPARIN SODIUM 25 UNITS/KG/HR: 10000 INJECTION, SOLUTION INTRAVENOUS at 16:24

## 2025-01-14 RX ADMIN — CHLORHEXIDINE GLUCONATE 0.12% ORAL RINSE 15 ML: 1.2 LIQUID ORAL at 21:16

## 2025-01-14 RX ADMIN — POTASSIUM CHLORIDE 20 MEQ: 29.8 INJECTION, SOLUTION INTRAVENOUS at 20:01

## 2025-01-14 RX ADMIN — LINEZOLID 600 MG: 600 INJECTION, SOLUTION INTRAVENOUS at 08:01

## 2025-01-14 NOTE — PROGRESS NOTES
Intensive Care Follow-up     Hospital:  LOS: 12 days   Ms. Natalia Arzate, 39 y.o. female is followed for:   Acute respiratory failure with hypercapnia            History of present illness:    40yo F with a history of HLD, Hypothyroidism, Afib, PE/DVT, Factor V Leiden mutation, and stroke who presented to [facility] with altered mental status. Labs there were significant for renal failure, hyperglycemia, and hypercapnic respiratory failure. Imaging consistent with basilar pulmonary infiltrates concerning for pneumonia. She was intubated and required the initiation of vasopressors. She was transferred to [facility] on 1/2/25 for ongoing care.      Since arrival, she has continued to require mechanical ventilation along with vasopressors. She has been started on Vancomycin, Flagyl and Cefepime. Cultures are significant for MRSA in the sputum along with gemella sanguinis in the blood.      Nephrology has been following for MARIAA with poor urine output and hyperkalemia.      On the morning of 1/4/25, she developed worsening hypoxia despite an FiO2 of 100% and PEEP of 16. Imaging showed white out of the left lung. Bronchoscopy was performed with mucopurulent secretions suctioned from the left upper and left lower lobes. Repeat CXR after bronchoscopy showed some improvement in left upper lobe aeration with persistent left lower lobe disease vs pleural effusion    Patient has worsening renal failure and started on CRRT which eventually was transitioned to intermittent hemodialysis      Subjective   Interval History:  Overnight patient had fever spikes.  Respiratory cultures growing MRSA.  Urine output improving.  Renal function is improving.  Worsening left lower lobe atelectasis.  Proceeded with bronchoscopy.             The patient's past medical, surgical and social history were reviewed and updated in Epic as appropriate.       Objective     Infusions:  dexmedetomidine HCl (PRECEDEX) 1,000 mcg in sodium chloride 0.9 % 250 mL  "infusion, 0.2-1.5 mcg/kg/hr, Last Rate: 1.5 mcg/kg/hr (01/14/25 1259)  fentanyl 10 mcg/mL,  mcg/hr, Last Rate: 225 mcg/hr (01/14/25 1257)  heparin, 25 Units/kg/hr, Last Rate: Stopped (01/14/25 1004)  insulin, 0-100 Units/hr, Last Rate: 12.6 Units/hr (01/14/25 1438)  norepinephrine, 0.02-0.3 mcg/kg/min, Last Rate: Stopped (01/08/25 2156)  Pharmacy to Dose Heparin,       Medications:  bumetanide, 2 mg, Intravenous, Q12H  cefepime, 2,000 mg, Intravenous, Once  cefepime, 2,000 mg, Intravenous, Q8H  chlorhexidine, 15 mL, Mouth/Throat, Q12H  famotidine, 20 mg, Intravenous, BID  Linezolid, 600 mg, Intravenous, BID  methylnaltrexone, 12 mg, Subcutaneous, Every Other Day  nystatin, , Topical, Q12H  senna-docusate sodium, 2 tablet, Nasogastric, BID   And  polyethylene glycol, 17 g, Nasogastric, Daily  sodium chloride, 10 mL, Intravenous, Q12H  sodium chloride, 10 mL, Intravenous, Q12H  sodium chloride, 10 mL, Intravenous, Q12H        Vital Sign Min/Max for last 24 hours  Temp  Min: 98.6 °F (37 °C)  Max: 102.7 °F (39.3 °C)   BP  Min: 107/66  Max: 182/101   Pulse  Min: 67  Max: 131   Resp  Min: 24  Max: 24   SpO2  Min: 88 %  Max: 100 %   Flow (L/min) (Oxygen Therapy)  Min: 50  Max: 50       Input/Output for last 24 hour shift  01/13 0701 - 01/14 0700  In: 3747 [I.V.:3647]  Out: 4175 [Urine:4075]   Mode: VC+/AC  FiO2 (%):  [40 %] 40 %  S RR:  [24] 24  S VT:  [380 mL] 380 mL  PEEP/CPAP (cm H2O):  [8 cm H20] 8 cm H20  MAP (cm H2O):  [13-14] 14  Objective:  Vital signs: (most recent): Blood pressure 154/95, pulse 87, temperature 98.6 °F (37 °C), temperature source Bladder, resp. rate 24, height 162.6 cm (64.02\"), weight (!) 165 kg (363 lb 4.8 oz), SpO2 100%, not currently breastfeeding.            General Appearance:  Not in distress, no asynchrony with mechanical ventilator.  Head:    Atraumatic, Normocephalic, Pupils reactive & symmetrical B/L.  Neck:  Endotracheal tube clean.   Lungs:   B/L Breath sounds present with " decreased breath sounds on bases, no wheezing heard, occ crackles.   Heart: S1 and S2 present, no murmur  Abdomen: Obese, soft, bowel sounds hypoactive  Extremities:   + edema, warm to touch.  Neurologic:  Pt sedated on the vent. Not able to participate in full neurological exam    Ventilator settings: A/C: FiO2 40 PEEP 8      Results from last 7 days   Lab Units 01/14/25  0451 01/13/25  0546 01/12/25  0525   WBC 10*3/mm3 8.66 5.67 5.47   HEMOGLOBIN g/dL 7.9* 7.5* 7.6*   PLATELETS 10*3/mm3 160 167 176     Results from last 7 days   Lab Units 01/14/25  1426 01/14/25  0451 01/13/25  0546 01/12/25  0525 01/11/25  1239 01/11/25  0837 01/10/25  2319   SODIUM mmol/L  --  142 137 134*   < > 137 132*   POTASSIUM mmol/L 2.8* 2.8* 3.8 3.8   < > 4.6 4.3   CO2 mmol/L  --  33.0* 22.0 17.0*   < > 13.0* 14.0*   BUN mg/dL  --  32* 31* 24*   < > 24* 19   CREATININE mg/dL  --  1.14* 1.48* 1.38*   < > 1.48* 1.12*   MAGNESIUM mg/dL  --  1.8 1.3* 1.9  --  2.2 2.3   PHOSPHORUS mg/dL  --  2.9  --   --   --  6.4* 6.1*   GLUCOSE mg/dL  --  198* 380* 239*   < > 143* 97    < > = values in this interval not displayed.     Estimated Creatinine Clearance: 104.4 mL/min (A) (by C-G formula based on SCr of 1.14 mg/dL (H)).    Results from last 7 days   Lab Units 01/14/25  0323   PH, ARTERIAL pH units 7.469*   PCO2, ARTERIAL mm Hg 44.2   PO2 ART mm Hg 71.5*       Images:   Abdominal x-ray reviewed and Showed tube is possibly within the antrum of stomach.      Chest x-ray reviewed and endotracheal tube above anna.  NG tube coursing below the diaphragm. Bibasilar atelectasis with worsening volume loss in the left lung..      Patient's results and images.     Assessment & Plan   Impression        Acute respiratory failure with hypercapnia    MARIAA (acute kidney injury)    Type 2 diabetes mellitus with hyperglycemia    Sepsis       Plan        1.  Patient presented here with respiratory failure currently on mechanical ventilation.  Left base  pneumonia.   Found to have MRSA cultures.  Will switch to Zyvox and monitor closely.  1 out of 2 blood culture positive for coag negative staph.  Repeat cultures have been negative likely contaminant.  Patient continues to have fevers.  Will repeat blood cultures today.  Performed bronchoscopy and extremely thick amount of secretions were suctioned out.  Will send for repeat cultures.  Will get ID team involved to help with antibiotic adjustments.  Discontinue central line and place PICC line.  2.  Continue heparin drip with history of atrial fibrillation and factor V Leiden with previous thromboembolic disease.  Seems to be tolerating well.  No acute bleed noted.  Hemoglobin low but stable.  3.  Renal function improving.  Nephrology team is following.  Continue diuresis.  4.  Responded to enema and had bowel movement yesterday.  Continue bowel regimen..  5.  Start enteral nutrition and advance as tolerated.    6.  Continue insulin drip to manage hyperglycemia.  7.  GI prophylaxis.  8.  Light sedation for comfort and ventilator synchrony.    Continue supportive care.  Patient remains at high risk of decline.  Continue aggressive support.    Plan of care and goals reviewed with multidisciplinary/antibiotic stewardship team during rounds.   I discussed the patient's findings and my recommendations with family and nursing staff     Time spent Critical care 30 min (exclusive of procedure time)  including high complexity decision making to assess, manipulate, and support vital organ system failure in this individual who has impairment of one or more vital organ systems such that there is a high probability of imminent or life threatening deterioration in the patient’s condition.  Above documentation reviewed and reflect accurate information as of 1/14/2025 including copied elements of the note.       Vince Toscano MD, [facility]  Pulmonary, Critical care and Sleep Medicine

## 2025-01-14 NOTE — PROGRESS NOTES
Pharmacy to Dose Heparin Infusion Note  Natalia Arzate is a 39 y.o. female receiving heparin infusion.     Therapy for (VTE/Cardiac): VTE  Patient Weight: 159 kg  Initial Bolus (Y/N): No  Any Bolus (Y/N): Yes  Signs or Symptoms of Bleeding: None currently - has been held previously in admission for dropping H/H.     VTE (PE/DVT)  Initial rate: 18 units/kg/hr (Max 1,500 units/hr)    Anti-Xa Bolus   Dose Infusion Hold   Time Infusion Rate Change (units/kg/hr) Repeat  Anti-Xa   < 0.11 50 units/kg  (4000 units Max) None Increase by  4 units/kg/hr 6 hours   0.11 - 0.19 25 units/kg  (2000 units Max) None Increase by  3 units/kg/hr 6 hours   0.2 - 0.29 0 None Increase by  2 units/kg/hr 6 hours   0.3 - 0.7 0 None No Change 6 hours (after 2 consecutive levels in range check qAM)   0.71 - 0.8 0 None Decrease by  1 units/kg/hr 6 hours   0.81 - 0.9 0 None Decrease by  2 units/kg/hr 6 hours   0.91 - 1 0 60 minutes Decrease by  3 units/kg/hr 6 hours   >1 0 Hold  After Anti-Xa less than 0.7 decrease previous rate by  4 units/kg/hr  Every 2 hours until Anti-Xa less than 0.7 then when infusion restarts in 6 hours     Results from last 7 days   Lab Units 01/14/25  0451 01/13/25  0546 01/12/25  0525   HEMOGLOBIN g/dL 7.9* 7.5* 7.6*   HEMATOCRIT % 24.9* 24.4* 25.1*   PLATELETS 10*3/mm3 160 167 176       Date   Time   Anti-Xa Current Rate (units/kg/hr) Bolus   (units) Rate Change   (units/kg/hr) New Rate (units/kg/hr) Repeat  Anti-Xa Comments /  Pump Check    1/2 1500 pending -- -- +9 9 0000 D/w RN; will await labs, but expect this to be an aPTT assay    1/2 N/a N/a 9 -- -- 9 2100 Baseline anti-Xa 0.1, re-timed next check for 2100.    1/2 2021 0.1 9 4000 +4 13 0400 DW RN   1/3 0500 0.10 13 4000 +4 17 1200 D/w RN   1/3 1200 0.19 17 4000 +1 18 2000 D/w RN   1/3 2035 0.23 18 -- +2 20 0400 D/w RN   1/4 0545 0.23 18 -- +2 22 1200 D/w RN     1/4   0815   HOLD   22   HOLD   HOLD   HOLD   HOLD D/w ROB Kinsey & APRN; hold heparin drip for  now 2/2 H&H dropping; no overt bleeding noted;   MAR placeholder entered.   1/6 1300 pending -- -- +9 9 2000 D/w Dr. De Jesus and ROB Kinsey. Restart heparin gtt. Will not give initial bolus given concern for dropping hgb. Ok for future boluses.     1/6 1956 0.10 9 4000 +4 13 0600 MYRA RN. Continue watching hgb, currently stable   1/7 0600 0.10 13 4000 +4 17 1200 D/w RN   1/7 1204 0.10 17 4000 +4 21 2000 D/w ROB [place]    1/7 2023 0.17 21 2000 +3 24 0600 D/w ROB Burns   1/8 0650 0.23 24 -- +1 26 1200 D/w RN   1/8 1250 0.31 26 -- -- 26 2000 D/w RN   1/8 2030 0.27 26 -- +2 28 0300 D/w ROB Burns   1/9 0300 0.59 28 -- -- 28 1000 D/w RN   1/9 0902 0.87 28 -- -2 26 1500  ROB Kinsey. H/H stable    1/9 1547 0.71 26 -- -1 25 2300 MYRA Kinsey   1/10 0003 0.61 25 -- -- 25 0600 [address]   1/10 0515 0.62 25 -- -- 25 AM labs [address]   1/11 0526 0.40 25 -- -- 25 AM labs [address]   1/12 0525 0.31 25 -- -- 25 AM labs [address]   1/13 0546 0.43 25 -- -- 25 AM Labs MYRA RIVAS   1/14 0451 0.54 25 -- -- 25 AM labs [facility]   1/14 1001 -- 25 -- -- Held for line placement AM labs MYRA RIVAS                                                                                Thank you,  Alecia Ingram, PharmD   01/14/25  13:41 EST

## 2025-01-14 NOTE — PLAN OF CARE
Goal Outcome Evaluation:            Bronch done, sputum sent,  Picc line placed, RIJ cental line d/c'd  ID consult  Electrolytes replaced and redrawn and replaced again  1500 ML UOP last 12 hours, evening dose bumex held until electrolytes replaced   TMax 101.1  Tube feeding restarted

## 2025-01-14 NOTE — PROGRESS NOTES
"   LOS: 12 days    Patient Care Team:  Bladimir Calle PA-C as PCP - General (Physician Assistant)    Subjective     Chart reviewed.  Patient remain on ventilator.  Intubated orally.  Renal function stable.  Review of system cannot be done.    Objective     Vital Signs:  Blood pressure 114/69, pulse 79, temperature (!) 101.1 °F (38.4 °C), temperature source Bladder, resp. rate 24, height 162.6 cm (64.02\"), weight (!) 165 kg (363 lb 4.8 oz), SpO2 93%, not currently breastfeeding.      Intake/Output Summary (Last 24 hours) at 1/14/2025 1001  Last data filed at 1/14/2025 0548  Gross per 24 hour   Intake 3168 ml   Output 3075 ml   Net 93 ml        01/13 0701 - 01/14 0700  In: 3747 [I.V.:3647]  Out: 4175 [Urine:4075]    Physical Exam:  General Appearance: On ventilator morbidly obese  female  Oral: ET tube in place.  Eyes: PER, EOMI.  Neck: Thick  Lungs: Equal chest movement, nonlabored.  Heart: No gallop, murmur, rub, RRR.  Abdomen: Soft, nontender, positive bowel sounds, morbid obesity  Extremities: No edema, no cyanosis.  Neuro: Cannot be evaluated  : Urena catheter in place.  + Temp HD cath.    Labs:  Results from last 7 days   Lab Units 01/14/25  0451 01/13/25  0546 01/12/25  0525   WBC 10*3/mm3 8.66 5.67 5.47   HEMOGLOBIN g/dL 7.9* 7.5* 7.6*   PLATELETS 10*3/mm3 160 167 176     Results from last 7 days   Lab Units 01/14/25  0451 01/13/25  0546 01/12/25  0525 01/11/25  1239 01/11/25  0837 01/10/25  2319 01/10/25  1617   SODIUM mmol/L 142 137 134* 138 137 132* 135*   POTASSIUM mmol/L 2.8* 3.8 3.8 4.7 4.6 4.3 4.4   CHLORIDE mmol/L 98 94* 91* 96* 99 95* 99   CO2 mmol/L 33.0* 22.0 17.0* 11.0* 13.0* 14.0* 17.0*   BUN mg/dL 32* 31* 24* 26* 24* 19 15   CREATININE mg/dL 1.14* 1.48* 1.38* 1.35* 1.48* 1.12* 0.84   CALCIUM mg/dL 10.0 9.2 9.3 9.7 9.2 8.9 8.9   PHOSPHORUS mg/dL 2.9  --   --   --  6.4* 6.1* 4.9*   MAGNESIUM mg/dL 1.8 1.3* 1.9  --  2.2 2.3 2.4   ALBUMIN g/dL 3.5  --   --   --  3.2* 3.1* 3.4* "     Results from last 7 days   Lab Units 01/14/25  0451   ALK PHOS U/L 207*   BILIRUBIN mg/dL 0.3   ALT (SGPT) U/L 9   AST (SGOT) U/L 22     Results from last 7 days   Lab Units 01/14/25  0323   PH, ARTERIAL pH units 7.469*   PO2 ART mm Hg 71.5*   PCO2, ARTERIAL mm Hg 44.2   HCO3 ART mmol/L 32.1*       A/P:      1-MARIAA:-.  Creatinine improving.  Patient remains nonoliguric.  Pre-renal azotemia vs sepsis related MARIAA. UA - RBC 3-5, + protein. 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 At home on lisinopril, metformin and topiramate. Initially was improving with IV hydration and hemodynamic support but later developed worsening resp status and volume overload.   Required  CRRT ~ 1/8/25-1/11/25.  For now continue supportive care.  No further need for HD at this time.  Discussed with her fiancé in the room    2- Shock- Off pressor. Hemodynamics improved after aggressive UF with CRRT    3-  Hyperkalemia -resolved.  Management with CRRT    4- Severe met acidosis: Improving. .    5- Anemia- Transfuse at hB<7    6-volume: Monitor input output.  No edema noted    7-Respiratory distress - suspicion of aspiration pneumonia. Intubated on MV.    High risk and complexity patient.    Gela Romano MD  01/14/25  10:01 EST

## 2025-01-14 NOTE — PROCEDURES
"Bronchoscopy    Date/Time: 1/14/2025 3:45 PM    Performed by: Vince Toscano MD  Authorized by: Vince Toscano MD  Consent: Written consent obtained.  Risks and benefits: risks, benefits and alternatives were discussed  Procedure consent: procedure consent matches procedure scheduled  Relevant documents: relevant documents present and verified  Test results: test results available and properly labeled  Site marked: the operative site was marked  Imaging studies: imaging studies available  Required items: required blood products, implants, devices, and special equipment available  Patient identity confirmed: anonymous protocol, patient vented/unresponsive  Time out: Immediately prior to procedure a \"time out\" was called to verify the correct patient, procedure, equipment, support staff and site/side marked as required.  Preparation: Patient was prepped and draped in the usual sterile fashion.  Patient tolerance: patient tolerated the procedure well with no immediate complications  Comments: Patient was already intubated.  Disposable bronchoscope was passed through endotracheal tube which is situated at least 3 cm above anna.  There was whitish mucus emanating from left mainstem all the way up to anna and plugging up the entire left mainstem.  We were able to suction out secretions and subsequently lavaged the other segments with saline lavage.  Mucous plugs were present throughout the bronchial tree all the way into the lower lobe segments.  We were able to clean out the airway with saline lavage.  Right lung and minimal retained secretions which were suctioned.  No endobronchial lesions noted.  No significant airway inflammation or bleeding noted either.  Secretions were collected and sent to lab for cultures.        "

## 2025-01-14 NOTE — PROGRESS NOTES
Clinical Nutrition     Patient Name: Natalia Arzate  YOB: 1985  MRN: [id number]  Date of Encounter: 25 11:33 EST  Admission date: 2025  Reason for Visit: Follow-up protocol, EN    Assessment   Nutrition Assessment   Admission Diagnosis:  Acute respiratory failure [J96.00]    Problem List:    Acute respiratory failure with hypercapnia    MARIAA (acute kidney injury)    Type 2 diabetes mellitus with hyperglycemia    Sepsis      PMH:   She  has a past medical history of A-fib, Abnormal ECG, Anemia, Anxiety, Asthma, Cancer, Depression, Diabetes mellitus, DVT (deep venous thrombosis), Factor 5 Leiden mutation, heterozygous, Fibroid, GERD (gastroesophageal reflux disease), Gout, H/O abdominal abscess, History of sepsis, History of transfusion, Hyperlipidemia, Hypothyroid, Kidney stone, Migraines, Neuropathy, Ovarian cancer (2021), Ovarian cyst, PE (pulmonary embolism), Polycystic ovary syndrome, Preeclampsia, Rh incompatibility, Stroke, TIA (transient ischemic attack), Urinary tract infection, and Varicella.    PSH:  She  has a past surgical history that includes  section; Cholecystectomy; Colonoscopy; Cardiac catheterization; Right oophorectomy; Abdominal surgery; Esophagogastroduodenoscopy; ureteroscopy laser lithotripsy with stent insertion (Left, 10/01/2021); Extracorporeal shock wave lithotripsy (Left, 10/22/2021); Lithotripsy; ureteroscopy laser lithotripsy with stent insertion (Left, 2022); ureteroscopy laser lithotripsy with stent insertion (Left, 2022); Nephrectomy (Left, 10/2022); and Hysterectomy ().    Substance history: Opioids    Applicable Nutrition History:     ARF/VENT  25    s/p Bronch 25    MARIAA- CRRT 25---stopped 1-10-25    HD done once 25    SKIN: L flank - ITD, coccyx- areas of scarring, sacrum small open area that is bleeding per WOC    Labs    Labs Reviewed: Yes    Results from last 7 days   Lab Units  01/14/25  0451 01/13/25  0546 01/12/25  0525 01/11/25  1456 01/11/25  1239 01/11/25  0837 01/10/25  2319 01/10/25  1617 01/10/25  0842 01/08/25  1631 01/08/25  0308   GLUCOSE mg/dL 198* 380* 239*  --    < > 143* 97   < > 76   < > 214*   BUN mg/dL 32* 31* 24*  --    < > 24* 19   < > 16   < > 65*   CREATININE mg/dL 1.14* 1.48* 1.38*  --    < > 1.48* 1.12*   < > 0.77   < > 3.47*   SODIUM mmol/L 142 137 134*  --    < > 137 132*   < > 134*   < > 141   CHLORIDE mmol/L 98 94* 91*  --    < > 99 95*   < > 97*   < > 103   POTASSIUM mmol/L 2.8* 3.8 3.8  --    < > 4.6 4.3   < > 4.1   < > 5.1   PHOSPHORUS mg/dL 2.9  --   --   --   --  6.4* 6.1*   < > 5.1*   < > 6.5*   MAGNESIUM mg/dL 1.8 1.3* 1.9  --   --  2.2 2.3   < > 2.3  2.3   < >  --    ALT (SGPT) U/L 9  --   --   --   --   --   --   --  8  --  12   LACTATE mmol/L  --   --   --  0.5  --   --   --   --   --   --   --     < > = values in this interval not displayed.       Results from last 7 days   Lab Units 01/14/25  0451 01/11/25  0837 01/10/25  2319 01/10/25  1617 01/10/25  0842   ALBUMIN g/dL 3.5 3.2* 3.1* 3.4* 3.2*   CRP mg/dL  --   --   --   --  13.22*   IONIZED CALCIUM mmol/L  --  1.19 1.14* 1.15 1.13*   CHOLESTEROL mg/dL  --   --   --   --  114   TRIGLYCERIDES mg/dL  --   --   --   --  368*       Results from last 7 days   Lab Units 01/14/25  1112 01/14/25  1007 01/14/25  0910 01/14/25  0751 01/14/25  0649 01/14/25  0547   GLUCOSE mg/dL 153* 158* 175* 132* 137* 182*     Lab Results   Lab Value Date/Time    HGBA1C 9.10 (H) 01/01/2025 2133    HGBA1C 9.6 (H) 12/16/2024 1154    HGBA1C 9.9 (H) 10/15/2024 0505                   Medications    Medications Reviewed: yes    Scheduled Meds:bumetanide, 2 mg, Intravenous, Q12H  chlorhexidine, 15 mL, Mouth/Throat, Q12H  famotidine, 20 mg, Intravenous, BID  Linezolid, 600 mg, Intravenous, BID  methylnaltrexone, 12 mg, Subcutaneous, Every Other Day  nystatin, , Topical, Q12H  senna-docusate sodium, 2 tablet, Nasogastric, BID    "And  polyethylene glycol, 17 g, Nasogastric, Daily      Continuous Infusions:dexmedetomidine HCl (PRECEDEX) 1,000 mcg in sodium chloride 0.9 % 250 mL infusion, 0.2-1.5 mcg/kg/hr, Last Rate: 1.4 mcg/kg/hr (01/14/25 1052)  fentanyl 10 mcg/mL,  mcg/hr, Last Rate: 175 mcg/hr (01/14/25 0825)  heparin, 25 Units/kg/hr, Last Rate: Stopped (01/14/25 1004)  insulin, 0-100 Units/hr, Last Rate: 9.8 Units/hr (01/14/25 1113)  norepinephrine, 0.02-0.3 mcg/kg/min, Last Rate: Stopped (01/08/25 2156)  Pharmacy to Dose Heparin,       PRN Meds:.  acetaminophen    senna-docusate sodium **AND** polyethylene glycol **AND** [DISCONTINUED] bisacodyl **AND** bisacodyl    dextrose    fentaNYL    glucagon (human recombinant)    ipratropium    ipratropium-albuterol    Pharmacy to Dose Heparin    Polyvinyl Alcohol-Povidone PF    Potassium Replacement - Follow Nurse / BPA Driven Protocol    sodium chloride    Intake/Ouptut 24 hrs (0701 - 0700)   I&O's Reviewed: yes    Intake & Output (last day)         01/13 0701 01/14 0700 01/14 0701  01/15 0700    I.V. (mL/kg) 3647 (22.1)     Other 100     NG/GT      IV Piggyback      Total Intake(mL/kg) 3747 (22.7)     Urine (mL/kg/hr) 4075 (1)     Emesis/NG output 100     Stool 0     Total Output 4175     Net -428           Stool Unmeasured Occurrence 1 x            Anthropometrics     Height: Height: 162.6 cm (64.02\")64in  Last Filed Weight: Weight: (!) 165 kg (363 lb 4.8 oz) (01/13/25 0600)350lb  Method: Weight Method: Bed scaleest  BMI: BMI (Calculated): 62.360    UBW: last MD office weight 336lb    Weight change:  ? 27lb wt gain since January     Weight       Weight (kg) Weight (lbs) Weight Method Visit Report   5/24/2023 129.729 kg  286 lb  Stated  --     127.007 kg  280 lb   --    6/24/2023 127.007 kg  280 lb  Stated     7/6/2023 --  --   --    7/26/2023 129.275 kg  285 lb      7/27/2023 132.904 kg  293 lb   --    8/15/2023 124.739 kg  275 lb  Stated     8/17/2023 127.007 kg  280 lb  Stated   "   9/10/2023 129.275 kg  285 lb  Stated     10/6/2023 140.161 kg (H)  309 lb (H)   --    11/7/2023 133.811 kg  295 lb  Stated     11/20/2023 133.811 kg  295 lb      11/24/2023 133.358 kg  294 lb      11/26/2023 133.811 kg  295 lb  Stated     1/2/2024 140.161 kg (H)  309 lb (H)  Stated     2/13/2024 145.605 kg (H)  321 lb (H)  Stated     3/12/2024 --  --   --    4/16/2024 142.611 kg (H)  314 lb 6.4 oz (H)   --    4/18/2024 142.429 kg (H)  314 lb (H)  Stated     4/26/2024 138.801 kg (H)  306 lb (H)  Stated     5/1/2024 138.801 kg (H)  306 lb (H)   --    5/6/2024 146.512 kg (H)  323 lb (H)   --    5/13/2024 146.512 kg (H)  323 lb (H)  Stated     7/8/2024 142.883 kg (H)  315 lb (H)  Stated     7/31/2024 147.691 kg (H)  325 lb 9.6 oz (H)   --    9/22/2024 143.79 kg (H)  317 lb (H)  Stated     12/3/2024 153.588 kg (H)  338 lb 9.6 oz (H)   --    12/16/2024 153.316 kg (H)  338 lb (H)   --     152.409 kg (H)  336 lb (H)   --    1/1/2025 158.759 kg (H)  350 lb (H)  Estimated        Legend:  (H) High  Nutrition Focused Physical Exam     Date:     Unable to perform due to Pt unable to participate at time of visit     Subjective   Reported/Observed/Food/Nutrition Related History:       1-14-25: pt intubated, sedated, fiancee at bedside  + fentanyl, precedex, insulin    Per RN: TF not restarted yesterday, unclear why, plan for bronch today    Plan to resume TF s/p Bronch    1-10: pt intubated sedated, remains on CRRT  + fentanyl, heparin    Per RN: pt has been off pressors since last night, is less puffy, able to tolerate turning, has not had a bm, had good bowel sound    Per MD: ok to resume TF    1-8: pt intubated, sedated + fentanyl, versed,levophed 0.06mcg, bicarb @75ml/hr    Per RN: pt had 800ml GRV last night TF on hold, is super acidotic, line placed for dialysis, plan to start CRRT     1-6: pt intubated, sedated, fiancee at bedside  + fentanyl, versed, insulin    Per RN:pt s/p emergent bronch Saturday,  trophic feed  "started yesterday    Per MD ok to advance TF    1-3: Pt intubated, sedated, fiancee at bedside  + insulin, fentanyl, versed, heparin, amio    Per RN: pt had wide complex rhythm last night, lokelma on hold as K+ wnl, 103 temp    Per MD ok to start TF,  place keofeed    Needs Assessment   Date: 1-3-25    Height used:Height: 162.6 cm (64.02\")64in  Weights used/ ABW: 336lb/ 153kg   IBW: 120lb/ 55kg    Estimated Calorie needs: ~1500kcal  Method:  22-25Kcals/KG IBW: 1210-1375kcal  Method:  MSJ ABW: 2195kcal  Method:  PSU ABW: 2745kcal    Estimated Protein needs: ~ 138g protein  Method: 2.5g protein per kg IBW: 138g protein  Method: 0.8-1g protein per kg ABW: 122-153g protein    Current Nutrition Prescription     PO: NPO Diet NPO Type: Strict NPO  Oral Nutrition Supplement:  Intake: N/A    EN: Peptamen Intense VHP  Goal Rate: 75ml  Water Flushes: 25ml  Modular: None  Route: NG   (keofeed replaced into 4th portion of duodenum 25)  Tube: Small bore    At goal over: 20Hrs/day     Rx will supply:   Goal Volume 1500  mL/day     Flush Volume 500 mL/day     Energy 1500 Kcal/day 100 % Est Need   Protein 138 g/day 100 % Est Need   Fiber 6 g/day     Water in  EN 1260 mL     Total Water 1760 mL     Meet DRI Yes        --------------------------------------------------------------------------  Product/Rate verified at bedside: Yes  Infusing Rate at time of visit: off    Average Delivery from [facility] Day:   Volume 0 mL/day 0  % Goal Vol.   Flush Volume 100 mL/day     Energy  Kcal/day  % Est Need   Protein  g/day  % Est Need   Fiber  g/day     Water in  EN  mL     Total Water  mL     Meet DRI No           Assessment & Plan   Nutrition Diagnosis   Date: 1-3-25  Updated: 25  Problem Inadequate energy intake    Etiology ARF/VENT   Signs/Symptoms 0% goal volume EN   Status: Active TF held?    Goal:   Nutrition to support treatment and Initiate EN      Nutrition Intervention      Follow treatment progress, Care plan " reviewed    s/p Bronch, restart TF at  @20ml/hr, gradually advance TF as tolerated to goal rate @75ml/hr, free water @25ml/hr    TF at goal volume will provide 1500ml, 1500kcal, 100% kcal needs, 138g protein, 100% protein needs, 38meq K+, 1020mg Phos, 6g fiber, 1760ml free water    Monitoring/Evaluation:   Per protocol, I&O, Pertinent labs, Weight, Skin status, GI status, Symptoms, Hemodynamic stability    Maria Del Carmen Matamoros, LOUIE  Time Spent: 30min

## 2025-01-14 NOTE — NURSING NOTE
Prior to central line removal, order for the removal of catheter was verified, patient was assessed, necessary materials were gathered and patient was unable to be educated due to patient condition .    Patient was positioned supine to ensure that the insertion site was at or below the level of the heart.    Hands were washed, clean gloves were applied and central line dressing was gently removed. Catheter exit site was not cultured.     A new pair of clean gloves were then applied. Insertion site was cleansed with 2% Chlorhexidine swab using a circular motion beginning at the insertion site and moving outward for 30 seconds and allowed to dry.     Clamp on line was present and clamped.     Patient currently on a ventilator.  Central line withdrawn during expiratory cycle.     The central line was grasped at the insertion site and slowly pulled outward parallel to the skin. Resistance was not met.    After central line was completely removed, a sterile 4x4 gauze pad was used to apply light pressure until bleeding stopped. At that time, petroleum-based gauze and a sterile occlusive dressing was applied to exit site.     Patient was instructed to keep dressing in place for at least 24 hours and to remain in a flat or reclining position for 30 minutes post-removal.     Catheter was inspected after removal and was intact. Tip of central line was not sent for culture. Patient tolerated procedure.  Prior to central line removal, order for the removal of catheter was verified, patient was assessed, necessary materials were gathered and patient was unable to be educated due to patient condition .

## 2025-01-14 NOTE — PLAN OF CARE
Goal Outcome Evaluation:  Plan of Care Reviewed With: patient, significant other, parent            * pt febrile all night, tmax 102.7, tylenol not affective  Cooling blanket applied  Adequate urine output @1450ML  Pt very irritated at times, increased agitiation, specifically with stimulation . Pt had increased HR, BP when irriated  Fent and precedex titrated based on agitation. Precedex at 1.4. pt bolused with fent with increased agitation per orders, this aided in decreasing HR/BP and pt able to be more compliant on vent  Pt ST most of the night,   Continued on glucommander, insulin titrated per protocol  No BM overnight  K+ replaced per protocol  opens eyes to voice, grimaces but does not follow commands, started moving L arm when agitated   Mom updated over phone

## 2025-01-14 NOTE — PLAN OF CARE
Goal Outcome Evaluation:                      Unable to decrease vent support

## 2025-01-14 NOTE — CONSULTS
INFECTIOUS DISEASES  INPATIENT CONSULT NOTE / INITIAL HOSPITAL VISIT      PATIENT NAME:  Natalia Arzate  YOB: 1985  MEDICAL RECORD NUMBER:  [id number]    Date of Admission:  1/2/2025  Date of Consult: 1/14/2025    Requesting Provider: CALVIN Toscano MD  Evaluating Physician: Rj Boyd MD    CC:  confusion    Reason for Consultation:   fevers    History of Present Illness:  Patient is a 39 y.o. female with a past medical history significant for obesity, factor V Leiden, and uncontrolled DMII who was admitted to [facility] on 1/2/2025 after presenting to outside ED with complaints of confusion.   Patient was found to have significant hyperglycemia.  She was intubated and transferred to [place] to the ICU.  Blood cultures were obtained.  Blood cultures x 2 from 1/1/2025 with 1 bottle positive for Gemella sanguinis and 1 bottle and the other set positive for coagulase-negative Staphylococcus.  Respiratory culture from 1/2/2025 with moderate growth MRSA.  She was initially treated with 5 days of IV vancomycin, cefepime, and metronidazole.  She was then given ampicillin for 3 days followed by Unasyn for 7 days along with vancomycin for 5 days.  She is now on linezolid.  She has developed fever over the past 2 days.  She remains intubated and sedated on mechanical ventilation in the ICU.  White blood cell count is normal.  No vasopressor requirements.  She was originally on CRRT but that has now been discontinued.  No skin lesions.  Chest x-ray with bibasilar airspace opacities.  Nursing reports thick creamy sputum production.  Urena catheter in place.  Right IJ central line has been in place from the outside ER.    I have been consulted to assist in workup and antimicrobial management of fevers.    Past Medical History:   Diagnosis Date    A-fib     Abnormal ECG     Anemia     Anxiety     Asthma     Cancer     Ovarian    Depression     Diabetes mellitus     DVT (deep venous  thrombosis)     Factor 5 Leiden mutation, heterozygous     Fibroid     GERD (gastroesophageal reflux disease)     Gout     H/O abdominal abscess     History of sepsis     History of transfusion     Hyperlipidemia     Hypothyroid     Kidney stone     Migraines     Neuropathy     Ovarian cancer 2021    Ovarian cyst     PE (pulmonary embolism)     Polycystic ovary syndrome     Preeclampsia     Rh incompatibility     Stroke     TIA (transient ischemic attack)     Urinary tract infection     Varicella        Past Surgical History:   Procedure Laterality Date    ABDOMINAL SURGERY      CARDIAC CATHETERIZATION       SECTION      CHOLECYSTECTOMY      COLONOSCOPY      ENDOSCOPY      EXTRACORPOREAL SHOCK WAVE LITHOTRIPSY (ESWL) Left 10/22/2021    Procedure: EXTRACORPOREAL SHOCKWAVE LITHOTRIPSY;  Surgeon: Milan Motley MD;  Location: [facility];  Service: Urology;  Laterality: Left;    HYSTERECTOMY  2017    ovarian ca    LITHOTRIPSY      NEPHRECTOMY Left 10/2022    RIGHT OOPHORECTOMY      URETEROSCOPY LASER LITHOTRIPSY WITH STENT INSERTION Left 10/01/2021    Procedure: URETEROSCOPY WITH STENT PLACEMENT;  Surgeon: Milan Motley MD;  Location: [facility];  Service: Urology;  Laterality: Left;    URETEROSCOPY LASER LITHOTRIPSY WITH STENT INSERTION Left 2022    Procedure: URETEROSCOPY STENT REMOVAL;  Surgeon: Milan Motley MD;  Location: [facility];  Service: Urology;  Laterality: Left;    URETEROSCOPY LASER LITHOTRIPSY WITH STENT INSERTION Left 2022    Procedure: CYSTOSCOPY RETROGRADE LEFT WITH STENT PLACEMENT;  Surgeon: Milan Motley MD;  Location: [facility];  Service: Urology;  Laterality: Left;       Family History   Problem Relation Age of Onset    Hypertension Mother     Diabetes Father     Hypertension Father     Heart attack Father     No Known Problems Sister     No Known Problems Brother     No Known Problems Daughter     No Known Problems Son     No Known  Problems Maternal Grandmother     No Known Problems Paternal Grandmother     Breast cancer Paternal Aunt     No Known Problems Maternal Grandfather     No Known Problems Paternal Grandfather     No Known Problems Cousin     Rheum arthritis Neg Hx     Osteoarthritis Neg Hx     Asthma Neg Hx     Heart failure Neg Hx     Hyperlipidemia Neg Hx     Migraines Neg Hx     Rashes / Skin problems Neg Hx     Seizures Neg Hx     Stroke Neg Hx     Thyroid disease Neg Hx        Social History     Socioeconomic History    Marital status:    Tobacco Use    Smoking status: Never     Passive exposure: Never    Smokeless tobacco: Never   Vaping Use    Vaping status: Never Used   Substance and Sexual Activity    Alcohol use: No    Drug use: Never    Sexual activity: Not Currently     Partners: Male     Birth control/protection: None         Allergies:  Allergies   Allergen Reactions    Salvia Officinalis Itching, Other (See Comments) and Shortness Of Breath    Shellfish Allergy Anaphylaxis    Toradol [Ketorolac Tromethamine] Anaphylaxis and Hives    Tree Nut Anaphylaxis and Shortness Of Breath     WALNUTS    Haldol [Haloperidol] Hives and Mental Status Change    Tramadol Hives and Swelling    Amoxicillin Hives and Rash     Tolerated ampicillin graded-dose challenge w/o complications (1/4/25).    Penicillins Hives and Rash     Tolerated ampicillin graded-dose challenge w/o complications (1/4/25).       Home Medications:  No current facility-administered medications on file prior to encounter.     Current Outpatient Medications on File Prior to Encounter   Medication Sig Dispense Refill    Albuterol-Budesonide 90-80 MCG/ACT aerosol Inhale 2 puffs Every 4 (Four) Hours As Needed (shortness of air).      allopurinol (ZYLOPRIM) 100 MG tablet Take 1 tablet by mouth Daily. 30 tablet 3    amitriptyline (ELAVIL) 25 MG tablet Take 1 tablet by mouth every night at bedtime.      amLODIPine (NORVASC) 10 MG tablet Take 1 tablet by mouth  Daily.      cholecalciferol (VITAMIN D3) 1.25 MG (24271 UT) capsule Take 1 capsule by mouth Every 7 (Seven) Days.      clopidogrel (PLAVIX) 75 MG tablet Take 1 tablet by mouth Daily. 30 tablet 3    DULoxetine (CYMBALTA) 30 MG capsule Take 2 capsules by mouth Daily.      DULoxetine (CYMBALTA) 30 MG capsule Take 1 capsule by mouth Every Night.      Enoxaparin Sodium (LOVENOX) 100 MG/ML solution prefilled syringe syringe Inject 1 mL under the skin into the appropriate area as directed Every 12 (Twelve) Hours.      fluticasone (FLONASE) 50 MCG/ACT nasal spray Administer 2 sprays into the nostril(s) as directed by provider Daily.      gabapentin (NEURONTIN) 300 MG capsule TAKE ONE CAPSULE BY MOUTH ONCE NIGHTLY AS NEEDED FOR NEUROPATHY (Patient taking differently: Take 1 capsule by mouth 3 (Three) Times a Day.) 30 capsule 0    Insulin Regular Human, Conc, (HumuLIN R) 500 UNIT/ML solution pen-injector CONCENTRATED injection Inject  under the skin into the appropriate area as directed. Amount?      ipratropium-albuterol (DUO-NEB) 0.5-2.5 mg/3 ml nebulizer Take 3 mL by nebulization Every 4 (Four) Hours As Needed for Shortness of Air. 150 mL 2    lisinopril (PRINIVIL,ZESTRIL) 10 MG tablet Take 1 tablet by mouth Daily.      metFORMIN (GLUCOPHAGE) 1000 MG tablet Take 1 tablet by mouth 2 (Two) Times a Day With Meals.      metoprolol succinate XL (TOPROL-XL) 50 MG 24 hr tablet Take 1 tablet by mouth 2 (Two) Times a Day.      mometasone-formoterol (DULERA 200) 200-5 MCG/ACT inhaler 2 puffs twice daily 1 g 2    montelukast (SINGULAIR) 10 MG tablet Take 1 tablet by mouth Every Night. 30 tablet 3    multivitamin with minerals (MULTIVITAMIN WOMEN PO) Take 1 tablet by mouth Daily.      nystatin (MYCOSTATIN) 626347 UNIT/GM powder Apply  topically to the appropriate area as directed 4 (Four) Times a Day.      pantoprazole (PROTONIX) 40 MG EC tablet Take 1 tablet by mouth Daily.      rosuvastatin (CRESTOR) 20 MG tablet Take 1 tablet by  mouth Every Night. (Patient taking differently: Take 2 tablets by mouth Every Night.) 30 tablet 5    vitamin B-12 (CYANOCOBALAMIN) 1000 MCG tablet Take 1 tablet by mouth Daily.         Current Hospital Medications:  Current Facility-Administered Medications   Medication Dose Route Frequency Provider Last Rate Last Admin    acetaminophen (TYLENOL) 160 MG/5ML oral solution 650 mg  650 mg Nasogastric Q6H PRN David Dumont APRN   650 mg at 01/14/25 0030    sennosides-docusate (PERICOLACE) 8.6-50 MG per tablet 2 tablet  2 tablet Nasogastric BID Sergei Davis APRN   2 tablet at 01/14/25 0801    And    polyethylene glycol (MIRALAX) packet 17 g  17 g Nasogastric Daily Sergei Davis APRN   17 g at 01/14/25 0801    And    bisacodyl (DULCOLAX) suppository 10 mg  10 mg Rectal Daily PRN Sergei Davis APRN        bumetanide (BUMEX) injection 2 mg  2 mg Intravenous Q12H Stanley Velasco MD   2 mg at 01/14/25 0534    chlorhexidine (PERIDEX) 0.12 % solution 15 mL  15 mL Mouth/Throat Q12H Beronica Crump APRN   15 mL at 01/14/25 0801    dexmedetomidine HCl (PRECEDEX) 1,000 mcg in sodium chloride 0.9 % 250 mL infusion  0.2-1.5 mcg/kg/hr Intravenous Titrated Saji De Jesus MD 57.8 mL/hr at 01/14/25 1052 1.4 mcg/kg/hr at 01/14/25 1052    dextrose (D50W) (25 g/50 mL) IV injection 10-50 mL  10-50 mL Intravenous Q15 Min PRN Bettie Walter APRN        famotidine (PEPCID) injection 20 mg  20 mg Intravenous BID Beronica Crump APRN   20 mg at 01/14/25 0801    fentaNYL (SUBLIMAZE) bolus from bag 10 mcg/mL injection 50 mcg  50 mcg Intravenous Q30 Min PRN Meredith Vanegas APRN   50 mcg at 01/14/25 0646    fentaNYL 2500 mcg/250 mL NS infusion   mcg/hr Intravenous Titrated De Jesus, Saji M, MD 17.5 mL/hr at 01/14/25 0825 175 mcg/hr at 01/14/25 0825    glucagon (GLUCAGEN) injection 1 mg  1 mg Intramuscular Q15 Min PRN Bettie Walter APRN        heparin 78117 units/250 mL (100 units/mL)  in 0.45 % NaCl infusion  25 Units/kg/hr Intravenous Titrated Neo Pickens CORTES   Held at 01/14/25 1004    insulin regular 1 unit/mL in 0.9% sodium chloride (Glucommander)  0-100 Units/hr Intravenous Titrated Bettie Walter APRN 8.6 mL/hr at 01/14/25 1209 8.6 Units/hr at 01/14/25 1209    ipratropium (ATROVENT) nebulizer solution 0.5 mg  0.5 mg Nebulization Q4H PRN Bettie Walter APRN        ipratropium-albuterol (DUO-NEB) nebulizer solution 3 mL  3 mL Nebulization Q4H PRN Bettie Walter APRN        Linezolid (ZYVOX) 600 mg 300 mL  600 mg Intravenous BID Vince Toscano  mL/hr at 01/14/25 0801 600 mg at 01/14/25 0801    methylnaltrexone (RELISTOR) injection 12 mg  12 mg Subcutaneous Every Other Day Saji De Jesus MD   12 mg at 01/13/25 0903    norepinephrine (LEVOPHED) 8 mg in 250 mL NS infusion (premix)  0.02-0.3 mcg/kg/min Intravenous Titrated Beronica Crump APRN   Held at 01/08/25 2156    nystatin (MYCOSTATIN) powder   Topical Q12H Bettie Walter APRN   Given at 01/14/25 0801    Pharmacy to Dose Heparin   Not Applicable Continuous PRN Bettie Walter APRN        Polyvinyl Alcohol-Povidone PF (ARTIFICIAL TEARS) 1.4-0.6 % ophthalmic solution 2 drop  2 drop Both Eyes Q2H PRN Saji De Jesus MD        Potassium Replacement - Follow Nurse / BPA Driven Protocol   Not Applicable PRN Bettie Walter APRN        sodium chloride 0.9 % flush 10 mL  10 mL Intravenous PRN Beronica Crump APRN   10 mL at 01/10/25 1010       Antimicrobials:  Anti-Infectives (From admission, onward)      Ordered     Dose/Rate Route Frequency Start Stop    01/13/25 1115  Linezolid (ZYVOX) 600 mg 300 mL        Ordering Provider: Vince Toscano MD    600 mg  300 mL/hr over 60 Minutes Intravenous 2 Times Daily 01/13/25 1215 01/18/25 0859    01/12/25 0722  vancomycin IVPB 1500 mg in 0.9% NaCl (Premix) 500 mL        Ordering Provider: David Moulton, PharmD    1,500 mg  333.3 mL/hr over 90  "Minutes Intravenous Once 01/12/25 0815 01/12/25 1043    01/11/25 1528  vancomycin (dosing per levels)  Status:  Discontinued        Ordering Provider: David Moulton, PharmD     Not Applicable Daily 01/11/25 1615 01/13/25 1052    01/08/25 1316  vancomycin IVPB 1750 mg in 0.9% Sodium Chloride (premix) 500 mL  Status:  Discontinued        Ordering Provider: Monalisa Simms, Jose E    1,750 mg  285.7 mL/hr over 105 Minutes Intravenous Every 24 Hours 01/08/25 1800 01/10/25 2146    01/07/25 1306  vancomycin (dosing per levels)  Status:  Discontinued        Ordering Provider: Monalisa Simms, PharmD     Not Applicable Daily 01/07/25 1400 01/08/25 1316    01/06/25 1143  ampicillin-sulbactam (UNASYN) 3 g in sodium chloride 0.9 % 100 mL MBP        Ordering Provider: Saji De Jesus MD    3 g  over 30 Minutes Intravenous Every 12 Hours 01/06/25 1500 01/13/25 0254    01/05/25 0738  ampicillin 2000 mg IVPB in 100 mL NS (MBP)  Status:  Discontinued        Ordering Provider: Tiffanie You., DO    2 g  200 mL/hr over 30 Minutes Intravenous Every 6 Hours 01/05/25 0900 01/06/25 1143    01/05/25 0800  Vancomycin HCl 1,250 mg in sodium chloride 0.9 % 250 mL VTB        Ordering Provider: Rocky Brewster RPH    1,250 mg  200 mL/hr over 75 Minutes Intravenous Once 01/05/25 0900 01/05/25 1105    01/04/25 1511  ampicillin 2000 mg IVPB in 100 mL NS (MBP)  Status:  Discontinued        Ordering Provider: Bettie Walter APRN    2 g  200 mL/hr over 30 Minutes Intravenous Every 4 Hours 01/04/25 2100 01/05/25 0738    01/04/25 1500  ampicillin 1780 mg/100 mL (89% dose) IVPB for IV graded dose challenge        Ordering Provider: Bettie Walter APRN   Placed in \"Followed by\" Linked Group    1,780 mg  400 mL/hr over 15 Minutes Intravenous Once 01/04/25 1800 01/04/25 1818    01/04/25 1500  ampicillin 200 mg/100 mL (10% dose) IVPB for IV graded dose challenge        Ordering Provider: Bettie Walter APRN   Placed in \"Followed by\" " "Linked Group    200 mg  400 mL/hr over 15 Minutes Intravenous Once 01/04/25 1700 01/04/25 1712    01/04/25 1500  ampicillin 20 mg/100 mL (1% dose) IVPB for IV graded dose challenge        Ordering Provider: Bettie Walter APRN   Placed in \"Followed by\" Linked Group    20 mg  400 mL/hr over 15 Minutes Intravenous Once 01/04/25 1600 01/04/25 1617    01/04/25 0833  vancomycin (dosing per levels)  Status:  Discontinued        Ordering Provider: Rocky Brewster RPH     Not Applicable Daily 01/04/25 0930 01/07/25 1306    01/03/25 1114  cefepime 2000 mg IVPB in 100 mL NS (MBP)  Status:  Discontinued        Ordering Provider: David Lazaro PharmD    2,000 mg  over 4 Hours Intravenous Every 8 Hours 01/03/25 1600 01/04/25 1500    01/02/25 1548  vancomycin (dosing per levels)  Status:  Discontinued        Ordering Provider: David Lazaro PharmD     Not Applicable Daily 01/03/25 0900 01/03/25 0738    01/03/25 0738  Vancomycin HCl 1,250 mg in sodium chloride 0.9 % 250 mL VTB  Status:  Discontinued        Ordering Provider: David Lazaro PharmD    1,250 mg  200 mL/hr over 75 Minutes Intravenous Every 12 Hours 01/03/25 0900 01/04/25 0833    01/02/25 1231  cefepime 2000 mg IVPB in 100 mL NS (MBP)  Status:  Discontinued        Ordering Provider: Bettie Walter APRN    2,000 mg  over 4 Hours Intravenous Every 12 Hours 01/02/25 2100 01/03/25 1114    01/02/25 1952  metroNIDAZOLE (FLAGYL) IVPB 500 mg  Status:  Discontinued        Ordering Provider: Roly Garcia MD    500 mg  200 mL/hr over 30 Minutes Intravenous Every 8 Hours 01/02/25 2100 01/06/25 1143    01/02/25 1547  vancomycin (VANCOCIN) 1,000 mg in sodium chloride 0.9 % 250 mL IVPB-VTB        Ordering Provider: David Lazaro PharmD    1,000 mg  250 mL/hr over 60 Minutes Intravenous Once 01/02/25 1645 01/02/25 1811    01/02/25 1511  Pharmacy to dose vancomycin        Ordering Provider: David Lazaro, PharmD     Not Applicable Continuous PRN " "25 1509 25 1508    25 1235  cefepime 2000 mg IVPB in 100 mL NS (MBP)        Ordering Provider: Bettie Walter APRN    2,000 mg  over 30 Minutes Intravenous Once 25 1330 25 1317    25 1228  cefTRIAXone (ROCEPHIN) 2,000 mg in sodium chloride 0.9 % 100 mL MBP  Status:  Discontinued        Ordering Provider: Bettie Walter APRN    2,000 mg  200 mL/hr over 30 Minutes Intravenous Every 24 Hours 25 1315 25 1230            Review of Systems: Unable to obtain due to current medical condition, intubation, and altered mental status.         Vital Signs:  Temp (24hrs), Av.4 °F (38.6 °C), Min:98.6 °F (37 °C), Max:102.7 °F (39.3 °C)    /95   Pulse 87   Temp 98.6 °F (37 °C) (Bladder)   Resp 24   Ht 162.6 cm (64.02\")   Wt (!) 165 kg (363 lb 4.8 oz)   LMP  (LMP Unknown) Comment: last menses 6 years ago (HCG negative today)  SpO2 100%   BMI 62.33 kg/m²     Mode: VC+/AC  FiO2 (%):  [40 %] 40 %  S RR:  [24] 24  S VT:  [380 mL] 380 mL  PEEP/CPAP (cm H2O):  [8 cm H20] 8 cm H20  MAP (cm H2O):  [13-14] 14      Physical Examination:  GENERAL: Acutely on chronically ill-appearing female.  Obese.  Intubated and sedated on mechanical ventilation in the ICU.  LINES: Right IJ CVL in place.  Right radial arterial line placed.  HEENT: Normocephalic, atraumatic.  Eyes closed.    CV: RRR. No murmur, rubs, gallops.  Normal S1S2.  LUNGS: Clear to auscultation bilaterally without wheezing, rales, rhonchi. Normal respiratory effort on vent.  Decreased BS B.  ABDOMEN: Positive bowel sounds.  Soft, nontender, nondistended.  No rebound or guarding.  Large pannus.  No rash underneath pannus.  EXT:  No clubbing, cyanosis, or edema.  :  +Urena catheter, draining clear yellow urine.  MSK: No joint effusions or inflammation noted.  SKIN: Warm and dry without rash or ulcer.  NEURO: sedated      Laboratory Data:    Results from last 7 days   Lab Units 25  0451 25  0546 " 01/12/25  0525   WBC 10*3/mm3 8.66 5.67 5.47   HEMOGLOBIN g/dL 7.9* 7.5* 7.6*   HEMATOCRIT % 24.9* 24.4* 25.1*   PLATELETS 10*3/mm3 160 167 176     Results from last 7 days   Lab Units 01/14/25  0451   SODIUM mmol/L 142   POTASSIUM mmol/L 2.8*   CHLORIDE mmol/L 98   CO2 mmol/L 33.0*   BUN mg/dL 32*   CREATININE mg/dL 1.14*   GLUCOSE mg/dL 198*   CALCIUM mg/dL 10.0     Results from last 7 days   Lab Units 01/14/25  0451   ALK PHOS U/L 207*   BILIRUBIN mg/dL 0.3   ALT (SGPT) U/L 9   AST (SGOT) U/L 22         Results from last 7 days   Lab Units 01/10/25  0842   CRP mg/dL 13.22*     Estimated Creatinine Clearance: 104.4 mL/min (A) (by C-G formula based on SCr of 1.14 mg/dL (H)).  Results from last 7 days   Lab Units 01/11/25  1456   LACTATE mmol/L 0.5         Results from last 7 days   Lab Units 01/12/25  0525 01/11/25  0526 01/10/25  1617 01/08/25  0308   VANCOMYCIN TR mcg/mL  --  22.40*  --   --    VANCOMYCIN RM mcg/mL 10.20  --  8.70 17.00             Microbiology:  Microbiology Results (last 10 days)       Procedure Component Value - Date/Time    Blood Culture - Blood, Hand, Left [[id number]]  (Normal) Collected: 01/08/25 1624    Lab Status: Final result Specimen: Blood from Hand, Left Updated: 01/13/25 1731     Blood Culture No growth at 5 days    Blood Culture - Blood, Blood, Arterial Line [[id number]]  (Normal) Collected: 01/08/25 1602    Lab Status: Final result Specimen: Blood, Arterial Line Updated: 01/13/25 1731     Blood Culture No growth at 5 days    Blood Culture - Blood, Arm, Right [[id number]]  (Normal) Collected: 01/05/25 1308    Lab Status: Final result Specimen: Blood from Arm, Right Updated: 01/10/25 1330     Blood Culture No growth at 5 days    Narrative:      Less than seven (7) mL's of blood was collected.  Insufficient quantity may yield false negative results.    Blood Culture - Blood, Arm, Left [[id number]]  (Abnormal) Collected: 01/05/25 130    Lab Status: Final result Specimen: Blood from Arm,  Left Updated: 01/07/25 1100     Blood Culture Staphylococcus, coagulase negative     Isolated from Anaerobic Bottle     Gram Stain Anaerobic Bottle Gram positive cocci in clusters    Narrative:      Less than seven (7) mL's of blood was collected.  Insufficient quantity may yield false negative results.  Probable contaminant requires clinical correlation, susceptibility not performed unless requested by physician.    Blood Culture ID, PCR - Blood, Arm, Left [[id number]]  (Abnormal) Collected: 01/05/25 1305    Lab Status: Final result Specimen: Blood from Arm, Left Updated: 01/06/25 1435     BCID, PCR Staph spp, not aureus or lugdunensis. Identification by BCID2 PCR.     BOTTLE TYPE Anaerobic Bottle                  Radiology:  Imaging Results (Last 72 Hours)       Procedure Component Value Units Date/Time    XR Chest 1 View [[id number]] Collected: 01/14/25 0818     Updated: 01/14/25 0825    Narrative:      XR CHEST 1 VW    Date of Exam: 1/14/2025 2:02 AM EST    Indication: resp failure    Comparison: None available.    Findings:  Patient is slightly rotated to the left. There are 2 right IJ central venous catheters, similar in position since the prior study. 2 enteric tubes descend below the diaphragm and beyond the field-of-view. Endotracheal tube overlies the upper trachea.    Cardiac silhouette is enlarged. Bibasilar opacities may be related to atelectasis, edema, or infiltrates. Possible left pleural effusion. No pneumothorax is seen. Osseous structures are unchanged.      Impression:      Impression:  1.Cardiomegaly with bibasilar airspace opacities which may be related to atelectasis, edema, or infiltrates. Possible left pleural effusion.  2.Overall, no significant change since 1/13/2025.      Electronically Signed: Apollo Solorzano MD    1/14/2025 8:22 AM EST    Workstation ID: [id number]    XR Abdomen KUB [[id number]] Collected: 01/13/25 1418     Updated: 01/13/25 1423    Narrative:      XR ABDOMEN KUB    Date of  Exam: 1/13/2025 1:06 PM EST    Indication: keofeed placement    Comparison: None available.    Findings:  NG tube is within the stomach. There is a feeding tube with tip either looped in the antrum of the stomach, or more likely within the third portion of the duodenum. No evidence of bowel obstruction.      Impression:      Impression:  Feeding tube tip either within the antrum of the stomach or third portion of the duodenum.        Electronically Signed: Jordy Baca MD    1/13/2025 2:20 PM EST    Workstation ID: [id number]    XR Chest 1 View [[id number]] Collected: 01/13/25 0804     Updated: 01/13/25 0810    Narrative:      XR CHEST 1 VW    Date of Exam: 1/13/2025 3:59 AM EST    Indication: Respiratory failure    Comparison: January 11, 2025    Findings:  The patient is slightly rotated. An endotracheal tube has its tip in the mid trachea. Enteric tubes have their tips below the diaphragm. A right internal jugular catheter has its tip in the upper SVC. Bibasilar opacities appear unchanged. There is a   small left pleural effusion. The heart and mediastinal contours appear stable. The pulmonary vasculature appears normal. The osseous structures appear intact.      Impression:      Impression:  1.Endotracheal tube in good position.  2.Bibasilar opacities appear unchanged, which could reflect atelectasis or pneumonia.  3.Small left pleural effusion.        Electronically Signed: Neo Maradiaga MD    1/13/2025 8:07 AM EST    Workstation ID: [id number]            I have personally reviewed the radiology images.        IMPRESSION:  39 y.o. female with history of factor V Leiden, obesity, and uncontrolled type 2 diabetes admitted with hyperglycemia.   Extended intubation with 14-day hospitalization, now with fever.    PROBLEM LIST:   -- Fever, hospital onset.  With prolonged ICU stay at 2 weeks with new fever, suspect nosocomial infection.  Consider central line associated bloodstream infection, catheter associated  urinary tract infection, ventilator associated pneumonia.  -- Acute hypoxic respiratory failure, intubated since 1/1/2025.    -- Bibasilar pulmonary infiltrates in the setting of fever and extended intubation, risk for ventilator associated pneumonia.  -- Indwelling Urena catheter, risk for catheter associated UTI.  -- Right IJ central line in place from outside ER, risk for CLABSI.  -- MRSA pneumonia, previously treated with vancomycin for 5 days, currently with linezolid.  -- Positive blood cultures.  Blood cultures x 2 from 1/1/2025 with 1 bottle positive for Gemella sanguinis and 1 bottle and the other set positive for coagulase-negative Staphylococcus.  CoNS likely contaminant.  Unclear significance for Gemella species - was treated with 10 days aminopenicillin (ampicillin followed by Unasyn).  Repeat blood cultures were no growth.  -- Obesity.  Skin appears intact at the fold without evidence for breakdown or infection.  -- Uncontrolled type 2 diabetes, contributing to propensity for infection and poor healing.  -- Factor V Leiden.  -- Listed penicillin allergy but tolerated ampicillin.    PLAN:  -- Remove central line  -- Check blood cultures x 2  -- Urinalysis with urine culture if indicated  -- Respiratory culture from ET tube aspirate  -- Continue linezolid for MRSA coverage given history of MRSA in sputum and add cefepime to cover for nosocomial gram-negatives including Pseudomonas  -- Assess for adverse drug reactions from antimicrobials.  -- Follow vitals, exam, labs, and cultures.  -- Follow results of pending culture data and tailor antibiotics as appropriate.  -- Final plans pending clinical course.    Thank you for the consultation.  I will continue to follow along with you.  Plan discussed with ICU nurse.      Rj Boyd MD  1/14/2025  12:33 EST

## 2025-01-15 ENCOUNTER — APPOINTMENT (OUTPATIENT)
Dept: GENERAL RADIOLOGY | Facility: HOSPITAL | Age: 40
DRG: 870 | End: 2025-01-15
Payer: COMMERCIAL

## 2025-01-15 LAB
ANION GAP SERPL CALCULATED.3IONS-SCNC: 15 MMOL/L (ref 5–15)
BASOPHILS # BLD AUTO: 0.03 10*3/MM3 (ref 0–0.2)
BASOPHILS NFR BLD AUTO: 0.4 % (ref 0–1.5)
BUN SERPL-MCNC: 30 MG/DL (ref 6–20)
BUN/CREAT SERPL: 34.5 (ref 7–25)
CALCIUM SPEC-SCNC: 9.7 MG/DL (ref 8.6–10.5)
CHLORIDE SERPL-SCNC: 97 MMOL/L (ref 98–107)
CO2 SERPL-SCNC: 28 MMOL/L (ref 22–29)
CREAT SERPL-MCNC: 0.87 MG/DL (ref 0.57–1)
DEPRECATED RDW RBC AUTO: 54.9 FL (ref 37–54)
EGFRCR SERPLBLD CKD-EPI 2021: 87.6 ML/MIN/1.73
EOSINOPHIL # BLD AUTO: 0.2 10*3/MM3 (ref 0–0.4)
EOSINOPHIL NFR BLD AUTO: 2.4 % (ref 0.3–6.2)
ERYTHROCYTE [DISTWIDTH] IN BLOOD BY AUTOMATED COUNT: 16.4 % (ref 12.3–15.4)
GLUCOSE BLDC GLUCOMTR-MCNC: 129 MG/DL (ref 70–130)
GLUCOSE BLDC GLUCOMTR-MCNC: 132 MG/DL (ref 70–130)
GLUCOSE BLDC GLUCOMTR-MCNC: 133 MG/DL (ref 70–130)
GLUCOSE BLDC GLUCOMTR-MCNC: 133 MG/DL (ref 70–130)
GLUCOSE BLDC GLUCOMTR-MCNC: 139 MG/DL (ref 70–130)
GLUCOSE BLDC GLUCOMTR-MCNC: 147 MG/DL (ref 70–130)
GLUCOSE BLDC GLUCOMTR-MCNC: 151 MG/DL (ref 70–130)
GLUCOSE BLDC GLUCOMTR-MCNC: 152 MG/DL (ref 70–130)
GLUCOSE BLDC GLUCOMTR-MCNC: 154 MG/DL (ref 70–130)
GLUCOSE BLDC GLUCOMTR-MCNC: 157 MG/DL (ref 70–130)
GLUCOSE BLDC GLUCOMTR-MCNC: 157 MG/DL (ref 70–130)
GLUCOSE BLDC GLUCOMTR-MCNC: 166 MG/DL (ref 70–130)
GLUCOSE BLDC GLUCOMTR-MCNC: 167 MG/DL (ref 70–130)
GLUCOSE BLDC GLUCOMTR-MCNC: 173 MG/DL (ref 70–130)
GLUCOSE BLDC GLUCOMTR-MCNC: 175 MG/DL (ref 70–130)
GLUCOSE BLDC GLUCOMTR-MCNC: 182 MG/DL (ref 70–130)
GLUCOSE BLDC GLUCOMTR-MCNC: 183 MG/DL (ref 70–130)
GLUCOSE BLDC GLUCOMTR-MCNC: 198 MG/DL (ref 70–130)
GLUCOSE SERPL-MCNC: 173 MG/DL (ref 65–99)
HCT VFR BLD AUTO: 26.1 % (ref 34–46.6)
HGB BLD-MCNC: 8.1 G/DL (ref 12–15.9)
IMM GRANULOCYTES # BLD AUTO: 0.07 10*3/MM3 (ref 0–0.05)
IMM GRANULOCYTES NFR BLD AUTO: 0.8 % (ref 0–0.5)
LYMPHOCYTES # BLD AUTO: 0.87 10*3/MM3 (ref 0.7–3.1)
LYMPHOCYTES NFR BLD AUTO: 10.5 % (ref 19.6–45.3)
MAGNESIUM SERPL-MCNC: 2.2 MG/DL (ref 1.6–2.6)
MCH RBC QN AUTO: 29 PG (ref 26.6–33)
MCHC RBC AUTO-ENTMCNC: 31 G/DL (ref 31.5–35.7)
MCV RBC AUTO: 93.5 FL (ref 79–97)
MONOCYTES # BLD AUTO: 0.49 10*3/MM3 (ref 0.1–0.9)
MONOCYTES NFR BLD AUTO: 5.9 % (ref 5–12)
NEUTROPHILS NFR BLD AUTO: 6.64 10*3/MM3 (ref 1.7–7)
NEUTROPHILS NFR BLD AUTO: 80 % (ref 42.7–76)
NRBC BLD AUTO-RTO: 0 /100 WBC (ref 0–0.2)
PHOSPHATE SERPL-MCNC: 3.3 MG/DL (ref 2.5–4.5)
PLATELET # BLD AUTO: 154 10*3/MM3 (ref 140–450)
PMV BLD AUTO: 10.2 FL (ref 6–12)
POTASSIUM SERPL-SCNC: 3 MMOL/L (ref 3.5–5.2)
POTASSIUM SERPL-SCNC: 3 MMOL/L (ref 3.5–5.2)
POTASSIUM SERPL-SCNC: 3.6 MMOL/L (ref 3.5–5.2)
RBC # BLD AUTO: 2.79 10*6/MM3 (ref 3.77–5.28)
SODIUM SERPL-SCNC: 140 MMOL/L (ref 136–145)
UFH PPP CHRO-ACNC: 0.54 IU/ML (ref 0.3–0.7)
WBC NRBC COR # BLD AUTO: 8.3 10*3/MM3 (ref 3.4–10.8)

## 2025-01-15 PROCEDURE — 25810000003 SODIUM CHLORIDE 0.9 % SOLUTION 250 ML FLEX CONT: Performed by: INTERNAL MEDICINE

## 2025-01-15 PROCEDURE — 85520 HEPARIN ASSAY: CPT

## 2025-01-15 PROCEDURE — 25010000002 LINEZOLID 600 MG/300ML SOLUTION: Performed by: INTERNAL MEDICINE

## 2025-01-15 PROCEDURE — 25010000002 MAGNESIUM SULFATE 2 GM/50ML SOLUTION

## 2025-01-15 PROCEDURE — 25010000002 POTASSIUM CHLORIDE PER 2 MEQ

## 2025-01-15 PROCEDURE — 80048 BASIC METABOLIC PNL TOTAL CA: CPT | Performed by: INTERNAL MEDICINE

## 2025-01-15 PROCEDURE — 94799 UNLISTED PULMONARY SVC/PX: CPT

## 2025-01-15 PROCEDURE — 25010000002 POTASSIUM CHLORIDE PER 2 MEQ: Performed by: INTERNAL MEDICINE

## 2025-01-15 PROCEDURE — 25010000002 HEPARIN (PORCINE) 25000-0.45 UT/250ML-% SOLUTION

## 2025-01-15 PROCEDURE — 94003 VENT MGMT INPAT SUBQ DAY: CPT

## 2025-01-15 PROCEDURE — 71045 X-RAY EXAM CHEST 1 VIEW: CPT

## 2025-01-15 PROCEDURE — 25010000002 FENTANYL 10 MCG/1 ML NS: Performed by: INTERNAL MEDICINE

## 2025-01-15 PROCEDURE — 25010000002 MIDAZOLAM PER 1 MG: Performed by: INTERNAL MEDICINE

## 2025-01-15 PROCEDURE — 25010000002 CEFEPIME PER 500 MG: Performed by: INTERNAL MEDICINE

## 2025-01-15 PROCEDURE — 85025 COMPLETE CBC W/AUTO DIFF WBC: CPT | Performed by: INTERNAL MEDICINE

## 2025-01-15 PROCEDURE — 25010000002 BUMETANIDE PER 0.5 MG: Performed by: INTERNAL MEDICINE

## 2025-01-15 PROCEDURE — 84132 ASSAY OF SERUM POTASSIUM: CPT | Performed by: INTERNAL MEDICINE

## 2025-01-15 PROCEDURE — 84132 ASSAY OF SERUM POTASSIUM: CPT

## 2025-01-15 PROCEDURE — 25010000002 METHYLNALTREXONE 12 MG/0.6ML SOLUTION: Performed by: INTERNAL MEDICINE

## 2025-01-15 PROCEDURE — 83735 ASSAY OF MAGNESIUM: CPT | Performed by: INTERNAL MEDICINE

## 2025-01-15 PROCEDURE — 99291 CRITICAL CARE FIRST HOUR: CPT | Performed by: INTERNAL MEDICINE

## 2025-01-15 PROCEDURE — 84100 ASSAY OF PHOSPHORUS: CPT | Performed by: INTERNAL MEDICINE

## 2025-01-15 PROCEDURE — 82948 REAGENT STRIP/BLOOD GLUCOSE: CPT

## 2025-01-15 RX ORDER — POTASSIUM CHLORIDE 29.8 MG/ML
20 INJECTION INTRAVENOUS
Status: COMPLETED | OUTPATIENT
Start: 2025-01-15 | End: 2025-01-15

## 2025-01-15 RX ORDER — BUMETANIDE 0.25 MG/ML
1 INJECTION, SOLUTION INTRAMUSCULAR; INTRAVENOUS DAILY
Status: DISCONTINUED | OUTPATIENT
Start: 2025-01-16 | End: 2025-01-26

## 2025-01-15 RX ORDER — CASTOR OIL AND BALSAM, PERU 788; 87 MG/G; MG/G
1 OINTMENT TOPICAL EVERY 12 HOURS SCHEDULED
Status: DISCONTINUED | OUTPATIENT
Start: 2025-01-15 | End: 2025-01-29

## 2025-01-15 RX ADMIN — CEFEPIME 2000 MG: 2 INJECTION, POWDER, FOR SOLUTION INTRAVENOUS at 21:05

## 2025-01-15 RX ADMIN — MIDAZOLAM HYDROCHLORIDE 2 MG: 1 INJECTION, SOLUTION INTRAMUSCULAR; INTRAVENOUS at 09:34

## 2025-01-15 RX ADMIN — NYSTATIN: 100000 POWDER TOPICAL at 09:17

## 2025-01-15 RX ADMIN — DEXMEDETOMIDINE 1.5 MCG/KG/HR: 200 INJECTION, SOLUTION INTRAVENOUS at 23:03

## 2025-01-15 RX ADMIN — POTASSIUM CHLORIDE 20 MEQ: 400 INJECTION, SOLUTION INTRAVENOUS at 19:02

## 2025-01-15 RX ADMIN — LINEZOLID 600 MG: 600 INJECTION, SOLUTION INTRAVENOUS at 08:13

## 2025-01-15 RX ADMIN — Medication 10 ML: at 08:15

## 2025-01-15 RX ADMIN — POTASSIUM CHLORIDE 20 MEQ: 400 INJECTION, SOLUTION INTRAVENOUS at 08:13

## 2025-01-15 RX ADMIN — FAMOTIDINE 20 MG: 10 INJECTION, SOLUTION INTRAVENOUS at 20:46

## 2025-01-15 RX ADMIN — Medication 10 ML: at 08:14

## 2025-01-15 RX ADMIN — INSULIN HUMAN 20 UNITS/HR: 1 INJECTION, SOLUTION INTRAVENOUS at 09:14

## 2025-01-15 RX ADMIN — MAGNESIUM SULFATE HEPTAHYDRATE 2 G: 2 INJECTION, SOLUTION INTRAVENOUS at 00:11

## 2025-01-15 RX ADMIN — SENNOSIDES AND DOCUSATE SODIUM 2 TABLET: 50; 8.6 TABLET ORAL at 08:14

## 2025-01-15 RX ADMIN — ACETAMINOPHEN 650 MG: 650 SOLUTION ORAL at 08:14

## 2025-01-15 RX ADMIN — CEFEPIME 2000 MG: 2 INJECTION, POWDER, FOR SOLUTION INTRAVENOUS at 05:18

## 2025-01-15 RX ADMIN — Medication 1 APPLICATION: at 20:46

## 2025-01-15 RX ADMIN — Medication 300 MCG/HR: at 01:28

## 2025-01-15 RX ADMIN — DEXMEDETOMIDINE 1.5 MCG/KG/HR: 200 INJECTION, SOLUTION INTRAVENOUS at 05:18

## 2025-01-15 RX ADMIN — SENNOSIDES AND DOCUSATE SODIUM 2 TABLET: 50; 8.6 TABLET ORAL at 20:46

## 2025-01-15 RX ADMIN — INSULIN HUMAN 13 UNITS/HR: 1 INJECTION, SOLUTION INTRAVENOUS at 20:10

## 2025-01-15 RX ADMIN — HEPARIN SODIUM 25 UNITS/KG/HR: 10000 INJECTION, SOLUTION INTRAVENOUS at 11:39

## 2025-01-15 RX ADMIN — FAMOTIDINE 20 MG: 10 INJECTION, SOLUTION INTRAVENOUS at 08:14

## 2025-01-15 RX ADMIN — NYSTATIN: 100000 POWDER TOPICAL at 20:45

## 2025-01-15 RX ADMIN — BUMETANIDE 2 MG: 0.25 INJECTION INTRAMUSCULAR; INTRAVENOUS at 00:14

## 2025-01-15 RX ADMIN — Medication 300 MCG/HR: at 09:42

## 2025-01-15 RX ADMIN — METHYLNALTREXONE BROMIDE 12 MG: 12 INJECTION, SOLUTION SUBCUTANEOUS at 08:14

## 2025-01-15 RX ADMIN — Medication 300 MCG/HR: at 16:56

## 2025-01-15 RX ADMIN — MIDAZOLAM HYDROCHLORIDE 2 MG: 1 INJECTION, SOLUTION INTRAMUSCULAR; INTRAVENOUS at 01:38

## 2025-01-15 RX ADMIN — CHLORHEXIDINE GLUCONATE 0.12% ORAL RINSE 15 ML: 1.2 LIQUID ORAL at 20:45

## 2025-01-15 RX ADMIN — CEFEPIME 2000 MG: 2 INJECTION, POWDER, FOR SOLUTION INTRAVENOUS at 14:35

## 2025-01-15 RX ADMIN — Medication 1 APPLICATION: at 14:37

## 2025-01-15 RX ADMIN — HEPARIN SODIUM 25 UNITS/KG/HR: 10000 INJECTION, SOLUTION INTRAVENOUS at 18:29

## 2025-01-15 RX ADMIN — DEXMEDETOMIDINE 1.5 MCG/KG/HR: 200 INJECTION, SOLUTION INTRAVENOUS at 00:16

## 2025-01-15 RX ADMIN — POTASSIUM CHLORIDE 20 MEQ: 400 INJECTION, SOLUTION INTRAVENOUS at 09:17

## 2025-01-15 RX ADMIN — POTASSIUM CHLORIDE 20 MEQ: 400 INJECTION, SOLUTION INTRAVENOUS at 06:31

## 2025-01-15 RX ADMIN — CHLORHEXIDINE GLUCONATE 0.12% ORAL RINSE 15 ML: 1.2 LIQUID ORAL at 08:14

## 2025-01-15 RX ADMIN — HEPARIN SODIUM 25 UNITS/KG/HR: 10000 INJECTION, SOLUTION INTRAVENOUS at 05:18

## 2025-01-15 RX ADMIN — LINEZOLID 600 MG: 600 INJECTION, SOLUTION INTRAVENOUS at 20:46

## 2025-01-15 RX ADMIN — DEXMEDETOMIDINE 1.5 MCG/KG/HR: 200 INJECTION, SOLUTION INTRAVENOUS at 09:47

## 2025-01-15 RX ADMIN — ACETAMINOPHEN 650 MG: 650 SOLUTION ORAL at 17:57

## 2025-01-15 RX ADMIN — POTASSIUM CHLORIDE 20 MEQ: 29.8 INJECTION, SOLUTION INTRAVENOUS at 00:11

## 2025-01-15 RX ADMIN — POTASSIUM CHLORIDE 20 MEQ: 400 INJECTION, SOLUTION INTRAVENOUS at 17:57

## 2025-01-15 RX ADMIN — DEXMEDETOMIDINE 1.5 MCG/KG/HR: 200 INJECTION, SOLUTION INTRAVENOUS at 16:56

## 2025-01-15 RX ADMIN — POLYETHYLENE GLYCOL 3350 17 G: 17 POWDER, FOR SOLUTION ORAL at 08:14

## 2025-01-15 RX ADMIN — INSULIN HUMAN 9.6 UNITS/HR: 1 INJECTION, SOLUTION INTRAVENOUS at 14:36

## 2025-01-15 NOTE — PLAN OF CARE
Goal Outcome Evaluation:  Plan of Care Reviewed With: patient, significant other                     Adequate urine output, pt diuresed  Pt very irritated at times, increased agitiation, specifically with stimulation . Pt had increased HR, BP when irriated  Fent and precedex remain maxed.   Versed administered x2 for severe agitation  Continued on glucommander q1, insulin titrated per protocol  No BM overnight  K+  and Mag replaced per protocol  opens eyes to voice, grimaces but does not follow commands  Increased tube feed to 50ml/hr  Mom updated over phone

## 2025-01-15 NOTE — PLAN OF CARE
Goal Outcome Evaluation:      Patient more awake throughout the shift and able to follow commands at 1700. Persistent fever in low 100's continues; cooling blanket continued.Arterial line removed. Copious thick, tan secretions at times. Precedex and fentanyl continued for sedation. Heparin gtt and insulin gtt continued. Potassium replaced x2. No bumex given today; UOP 1200 ml's. Family updated at bedside.

## 2025-01-15 NOTE — PROGRESS NOTES
"   LOS: 13 days    Patient Care Team:  Bladimir Calle PA-C as PCP - General (Physician Assistant)    Subjective   Chart reviewed.  Patient remain on ventilator.  MARIAA improving.  Hyponatremia with diuresis noted.  High risk complex patient.  Discussed with the family members in the room.      Objective     Vital Signs:  Blood pressure 123/62, pulse 97, temperature 100.1 °F (37.8 °C), temperature source Oral, resp. rate 24, height 162.6 cm (64.02\"), weight (!) 165 kg (363 lb 4.8 oz), SpO2 95%, not currently breastfeeding.      Intake/Output Summary (Last 24 hours) at 1/15/2025 0933  Last data filed at 1/15/2025 0921  Gross per 24 hour   Intake 6320.98 ml   Output 4900 ml   Net 1420.98 ml        01/14 0701 - 01/15 0700  In: 5565.8 [I.V.:4095.8]  Out: 5200 [Urine:4300]    Physical Exam:  General Appearance: Morbidly obese  female on ventilator.  Oral: ET tube in place.  Eyes: PER, EOMI.  Neck: Thick  Lungs: Equal chest movement, nonlabored.  Heart: No gallop, murmur, rub, RRR.  Abdomen: Soft, nontender, positive bowel sounds, morbid obesity  Extremities: No edema, no cyanosis.  Neuro: Cannot be evaluated  : Urena catheter in place.      Labs:  Results from last 7 days   Lab Units 01/15/25  0430 01/14/25  0451 01/13/25  0546   WBC 10*3/mm3 8.30 8.66 5.67   HEMOGLOBIN g/dL 8.1* 7.9* 7.5*   PLATELETS 10*3/mm3 154 160 167     Results from last 7 days   Lab Units 01/15/25  0430 01/14/25  1426 01/14/25  0451 01/13/25  0546 01/12/25  0525 01/11/25  1239 01/11/25  0837 01/10/25  2319 01/10/25  1617   SODIUM mmol/L 140  --  142 137 134*   < > 137 132* 135*   POTASSIUM mmol/L 3.0*  3.0* 2.8* 2.8* 3.8 3.8   < > 4.6 4.3 4.4   CHLORIDE mmol/L 97*  --  98 94* 91*   < > 99 95* 99   CO2 mmol/L 28.0  --  33.0* 22.0 17.0*   < > 13.0* 14.0* 17.0*   BUN mg/dL 30*  --  32* 31* 24*   < > 24* 19 15   CREATININE mg/dL 0.87  --  1.14* 1.48* 1.38*   < > 1.48* 1.12* 0.84   CALCIUM mg/dL 9.7  --  10.0 9.2 9.3   < > 9.2 8.9 8.9 "   PHOSPHORUS mg/dL 3.3  --  2.9  --   --   --  6.4* 6.1* 4.9*   MAGNESIUM mg/dL 2.2 1.2* 1.8 1.3* 1.9  --  2.2 2.3 2.4   ALBUMIN g/dL  --   --  3.5  --   --   --  3.2* 3.1* 3.4*    < > = values in this interval not displayed.     Results from last 7 days   Lab Units 01/14/25  0451   ALK PHOS U/L 207*   BILIRUBIN mg/dL 0.3   ALT (SGPT) U/L 9   AST (SGOT) U/L 22     Results from last 7 days   Lab Units 01/14/25  0323   PH, ARTERIAL pH units 7.469*   PO2 ART mm Hg 71.5*   PCO2, ARTERIAL mm Hg 44.2   HCO3 ART mmol/L 32.1*       A/P:      1.  MARIAA:.  Solitary kidney renal function improved.  Creatinine baseline.  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 At home on lisinopril, metformin and topiramate. Initially was improving with IV hydration and hemodynamic support but later developed worsening resp status and volume overload.   Required  CRRT ~ 1/8/25-1/11/25.  For now continue supportive care.  No further need for HD at this time.  Discussed with her fiancé in the room    2.  Shock- Off pressor. Hemodynamics improved after aggressive UF with CRRT    3.  Hyperkalemia -resolved.  Management with CRRT was done    4.  Severe met acidosis: Improving. .    6.  Anemia- Transfuse at hB<7    7.  Volume: Monitor input output.  No edema noted    8.  Respiratory distress - suspicion of aspiration pneumonia. Intubated on MV.    High risk complex patient multiple medical problems.  Replace potassium and magnesium  Discussed with the staff RN.  Edema has markedly improved, will decrease the Bumex once a day    Gela Romano MD  01/15/25  09:33 EST

## 2025-01-15 NOTE — CASE MANAGEMENT/SOCIAL WORK
Continued Stay Note  [facility]     Patient Name: Natalia Arzate  MRN: [id number]  Today's Date: 1/15/2025    Admit Date: 1/2/2025    Plan: ongoing   Discharge Plan       Row Name 01/15/25 1122       Plan    Plan ongoing    Patient/Family in Agreement with Plan other (see comments)    Plan Comments Discussed in MDR, remains intubated/sedated on vent. ID consulted 1/14, on IV Zyvox, and Cefepime. Remains febrile, art line removed 1/14 and bronched on 1/14. D/C TBD. CM will continue to follow.                   Discharge Codes    No documentation.                 Expected Discharge Date and Time       Expected Discharge Date Expected Discharge Time    Jan 17, 2025               Rj Valle RN

## 2025-01-15 NOTE — PROGRESS NOTES
"  INFECTIOUS DISEASES  INPATIENT PROGRESS NOTE  1/15/2025      PATIENT NAME: Natalia Arzate  :  1985  MRN:  [id number]  Date of Admission:  2025      Antimicrobials:  Linezolid - started 25  Cefepime - started 25      MAR reviewed.      Reason for consultation:  fevers    Interval history:  Patient remains intubated/sedated on mechanical ventilation in ICU.  Fever curve improved.  Cefepime added yesterday to linezolid.  BAL performed and culture with normal hafsa thus far.  Blood cultures no growth so far.  CVL removed and PICC placed.    ROS:  Unable to obtain due to current medical condition, intubation, and altered mental status.      Objective:  Temp (24hrs), Av.9 °F (37.7 °C), Min:98.6 °F (37 °C), Max:101.1 °F (38.4 °C)    /70 (BP Location: Right leg, Patient Position: Lying)   Pulse 69   Temp 100.2 °F (37.9 °C) (Axillary)   Resp 24   Ht 162.6 cm (64.02\")   Wt (!) 165 kg (363 lb 4.8 oz)   LMP  (LMP Unknown) Comment: last menses 6 years ago (HCG negative today)  SpO2 97%   BMI 62.33 kg/m²     Mode: VC+/AC  FiO2 (%):  [35 %-50 %] 50 %  S RR:  [24] 24  S VT:  [380 mL] 380 mL  PEEP/CPAP (cm H2O):  [8 cm H20] 8 cm H20  MAP (cm H2O):  [13-14] 13      Physical Examination:  GENERAL: Acutely on chronically ill-appearing female.  Obese.  Intubated and sedated on mechanical ventilation in the ICU.  LINES: Right IJ temp HD cath.  Left arm PICC.  HEENT: Normocephalic, atraumatic.  Eyes closed.    CV: RRR. No murmur, rubs, gallops.  Normal S1S2.  LUNGS: Clear to auscultation bilaterally without wheezing, rales, rhonchi. Normal respiratory effort on vent.  Decreased BS B.  ABDOMEN: Positive bowel sounds.  Soft, nontender, nondistended.  No rebound or guarding.  Large pannus.  No rash underneath pannus.  EXT:  No clubbing, cyanosis, or edema.  :  +Urena catheter, draining clear yellow urine.  MSK: No joint effusions or inflammation noted.  SKIN: Warm and dry without rash or " ulcer.  NEURO: sedated    Laboratory Data:    Results from last 7 days   Lab Units 01/15/25  0430 01/14/25  0451 01/13/25  0546   WBC 10*3/mm3 8.30 8.66 5.67   HEMOGLOBIN g/dL 8.1* 7.9* 7.5*   HEMATOCRIT % 26.1* 24.9* 24.4*   PLATELETS 10*3/mm3 154 160 167     Results from last 7 days   Lab Units 01/15/25  0430   SODIUM mmol/L 140   POTASSIUM mmol/L 3.0*  3.0*   CHLORIDE mmol/L 97*   CO2 mmol/L 28.0   BUN mg/dL 30*   CREATININE mg/dL 0.87   GLUCOSE mg/dL 173*   CALCIUM mg/dL 9.7     Results from last 7 days   Lab Units 01/14/25  0451   ALK PHOS U/L 207*   BILIRUBIN mg/dL 0.3   ALT (SGPT) U/L 9   AST (SGOT) U/L 22         Results from last 7 days   Lab Units 01/10/25  0842   CRP mg/dL 13.22*     Estimated Creatinine Clearance: 136.7 mL/min (by C-G formula based on SCr of 0.87 mg/dL).  Results from last 7 days   Lab Units 01/11/25  1456   LACTATE mmol/L 0.5         Results from last 7 days   Lab Units 01/12/25  0525 01/11/25  0526 01/10/25  1617   VANCOMYCIN TR mcg/mL  --  22.40*  --    VANCOMYCIN RM mcg/mL 10.20  --  8.70             Microbiology:    Microbiology Results (last 10 days)       Procedure Component Value - Date/Time    Respiratory Culture - Sputum, ET Suction [[id number]] Collected: 01/14/25 1857    Lab Status: Preliminary result Specimen: Sputum from ET Suction Updated: 01/15/25 1207     Respiratory Culture Light growth (2+) The culture consists of normal respiratory hafsa. This is a preliminary report; final report to follow.     Gram Stain Many (4+) WBCs per low power field      Rare (1+) Epithelial cells per low power field      Few (2+) Gram positive cocci in pairs, chains and clusters    Respiratory Culture - Wash, Lung, Left Lower Lobe [[id number]] Collected: 01/14/25 1608    Lab Status: Preliminary result Specimen: Wash from Lung, Left Lower Lobe Updated: 01/15/25 1217     Respiratory Culture Light growth (2+) The culture consists of normal respiratory hafsa. This is a preliminary report; final  report to follow.     Gram Stain Many (4+) WBCs per low power field      Rare (1+) Epithelial cells per low power field      Rare (1+) Gram positive cocci in pairs and clusters    Blood Culture - Blood, Hand, Right [[id number]]  (Normal) Collected: 01/14/25 1430    Lab Status: Preliminary result Specimen: Blood from Hand, Right Updated: 01/15/25 1500     Blood Culture No growth at 24 hours    Narrative:      Less than seven (7) mL's of blood was collected.  Insufficient quantity may yield false negative results.    Blood Culture - Blood, Hand, Left [[id number]]  (Normal) Collected: 01/08/25 1624    Lab Status: Final result Specimen: Blood from Hand, Left Updated: 01/13/25 1731     Blood Culture No growth at 5 days    Blood Culture - Blood, Blood, Arterial Line [[id number]]  (Normal) Collected: 01/08/25 1602    Lab Status: Final result Specimen: Blood, Arterial Line Updated: 01/13/25 1731     Blood Culture No growth at 5 days                Radiology:  XR Chest 1 View    Result Date: 1/15/2025  Impression: Interval placement of a left upper extremity PICC line. Redemonstration of dense retrocardiac and left base opacities suggestive of left lower lobe atelectasis. Left-sided pleural effusion is difficult to exclude. Electronically Signed: Jimmie Santana MD  1/15/2025 8:22 AM EST  Workstation ID: [id number]    XR Chest 1 View    Result Date: 1/14/2025  Impression: 1.Cardiomegaly with bibasilar airspace opacities which may be related to atelectasis, edema, or infiltrates. Possible left pleural effusion. 2.Overall, no significant change since 1/13/2025. Electronically Signed: Apollo Solorzano MD  1/14/2025 8:22 AM EST  Workstation ID: [id number]     I have personally reviewed the radiology images.        DISCUSSION:  39 y.o. female with history of factor V Leiden, obesity, and uncontrolled type 2 diabetes admitted with hyperglycemia.   Extended intubation with 14-day hospitalization, now with fever.     PROBLEM LIST:   --  Fever, hospital onset - improving on linezolid/cefepime.  With prolonged ICU stay at 2 weeks with new fever, suspect nosocomial infection.  Consider central line associated bloodstream infection, catheter associated urinary tract infection, ventilator associated pneumonia.  UA not collected.  Blood cultures no growth so far.  Prior CVL removed.  BAL culture normal hafsa so far.  -- Acute hypoxic respiratory failure, intubated since 1/1/2025.    -- Bibasilar pulmonary infiltrates in the setting of fever and extended intubation, risk for ventilator associated pneumonia.  -- Indwelling Urena catheter, risk for catheter associated UTI.  -- Right IJ central line in place from outside ER, risk for CLABSI.  -- MRSA pneumonia, previously treated with vancomycin for 5 days, currently with linezolid.  -- Positive blood cultures.  Blood cultures x 2 from 1/1/2025 with 1 bottle positive for Gemella sanguinis and 1 bottle and the other set positive for coagulase-negative Staphylococcus.  CoNS likely contaminant.  Unclear significance for Gemella species - was treated with 10 days aminopenicillin (ampicillin followed by Unasyn).  Repeat blood cultures were no growth.  -- Obesity.  Skin appears intact at the fold without evidence for breakdown or infection.  -- Uncontrolled type 2 diabetes, contributing to propensity for infection and poor healing.  -- Factor V Leiden.  -- Listed penicillin allergy but tolerated ampicillin.    PLAN:  -- Continue empiric linezolid and cefepime for now  -- Follow cultures  -- Monitor vitals, exam, and labs  -- Monitor for adverse reactions to antimicrobials    Complex case.      Rj Boyd MD  1/15/2025  15:03 EST

## 2025-01-15 NOTE — PROGRESS NOTES
Intensive Care Follow-up     Hospital:  LOS: 13 days   Ms. Natalia Arzate, 39 y.o. female is followed for:   Acute respiratory failure with hypercapnia            History of present illness:    40yo F with a history of HLD, Hypothyroidism, Afib, PE/DVT, Factor V Leiden mutation, and stroke who presented to [facility] with altered mental status. Labs there were significant for renal failure, hyperglycemia, and hypercapnic respiratory failure. Imaging consistent with basilar pulmonary infiltrates concerning for pneumonia. She was intubated and required the initiation of vasopressors. She was transferred to [facility] on 1/2/25 for ongoing care.      Since arrival, she has continued to require mechanical ventilation along with vasopressors. She has been started on Vancomycin, Flagyl and Cefepime. Cultures are significant for MRSA in the sputum along with gemella sanguinis in the blood.      Nephrology has been following for MARIAA with poor urine output and hyperkalemia.      On the morning of 1/4/25, she developed worsening hypoxia despite an FiO2 of 100% and PEEP of 16. Imaging showed white out of the left lung. Bronchoscopy was performed with mucopurulent secretions suctioned from the left upper and left lower lobes. Repeat CXR after bronchoscopy showed some improvement in left upper lobe aeration with persistent left lower lobe disease vs pleural effusion    Patient has worsening renal failure and started on CRRT which eventually was transitioned to intermittent hemodialysis    Due to persistent fevers bronchoscopy was done on 1/14 with large amount of secretions suctioned out from left lung field.  Central line was also discontinued and PICC line placed.  Arterial line discontinued 1/15.    Subjective   Interval History:  Overnight patient continued to have fevers.  Underwent bronchoscopy yesterday with large amount of secretions suctioned out.  Seen by infectious disease.  Remains sedated on ventilator.  Urine output is  "improving.             The patient's past medical, surgical and social history were reviewed and updated in Epic as appropriate.       Objective     Infusions:  dexmedetomidine HCl (PRECEDEX) 1,000 mcg in sodium chloride 0.9 % 250 mL infusion, 0.2-1.5 mcg/kg/hr, Last Rate: 1.5 mcg/kg/hr (01/15/25 0947)  fentanyl 10 mcg/mL,  mcg/hr, Last Rate: 300 mcg/hr (01/15/25 0942)  heparin, 25 Units/kg/hr, Last Rate: 25 Units/kg/hr (01/15/25 1139)  insulin, 0-100 Units/hr, Last Rate: 9.6 Units/hr (01/15/25 1429)  Pharmacy to Dose Heparin,       Medications:  [START ON 1/16/2025] bumetanide, 1 mg, Intravenous, Daily  castor oil-balsam peru, 1 Application, Topical, Q12H  cefepime, 2,000 mg, Intravenous, Q8H  chlorhexidine, 15 mL, Mouth/Throat, Q12H  famotidine, 20 mg, Intravenous, BID  Linezolid, 600 mg, Intravenous, BID  methylnaltrexone, 12 mg, Subcutaneous, Every Other Day  nystatin, , Topical, Q12H  senna-docusate sodium, 2 tablet, Nasogastric, BID   And  polyethylene glycol, 17 g, Nasogastric, Daily        Vital Sign Min/Max for last 24 hours  Temp  Min: 98.6 °F (37 °C)  Max: 101.1 °F (38.4 °C)   BP  Min: 118/64  Max: 206/95   Pulse  Min: 61  Max: 116   Resp  Min: 23  Max: 24   SpO2  Min: 88 %  Max: 100 %   Flow (L/min) (Oxygen Therapy)  Min: 50  Max: 50       Input/Output for last 24 hour shift  01/14 0701 - 01/15 0700  In: 5565.8 [I.V.:4095.8]  Out: 5200 [Urine:4300]   Mode: VC+/AC  FiO2 (%):  [35 %-50 %] 50 %  S RR:  [24] 24  S VT:  [380 mL] 380 mL  PEEP/CPAP (cm H2O):  [8 cm H20] 8 cm H20  MAP (cm H2O):  [13-14] 13  Objective:  Vital signs: (most recent): Blood pressure 158/84, pulse 89, temperature 100.5 °F (38.1 °C), temperature source Bladder, resp. rate 24, height 162.6 cm (64.02\"), weight (!) 165 kg (363 lb 4.8 oz), SpO2 91%, not currently breastfeeding.            General Appearance:  Not in distress, no asynchrony with mechanical ventilator.  Head:    Atraumatic, Normocephalic, Pupils reactive & symmetrical " B/L.  Neck:  Endotracheal tube clean.   Lungs:   B/L Breath sounds present with decreased breath sounds on bases, no wheezing heard, occ crackles.   Heart: S1 and S2 present, no murmur  Abdomen: Obese, soft, bowel sounds hypoactive  Extremities:   + edema, warm to touch.  Neurologic:  Pt sedated on the vent. Not able to participate in full neurological exam    Ventilator settings: A/C: FiO2 50 PEEP 8      Results from last 7 days   Lab Units 01/15/25  0430 01/14/25  0451 01/13/25  0546   WBC 10*3/mm3 8.30 8.66 5.67   HEMOGLOBIN g/dL 8.1* 7.9* 7.5*   PLATELETS 10*3/mm3 154 160 167     Results from last 7 days   Lab Units 01/15/25  0430 01/14/25  1426 01/14/25  0451 01/13/25  0546 01/11/25  1239 01/11/25  0837   SODIUM mmol/L 140  --  142 137   < > 137   POTASSIUM mmol/L 3.0*  3.0* 2.8* 2.8* 3.8   < > 4.6   CO2 mmol/L 28.0  --  33.0* 22.0   < > 13.0*   BUN mg/dL 30*  --  32* 31*   < > 24*   CREATININE mg/dL 0.87  --  1.14* 1.48*   < > 1.48*   MAGNESIUM mg/dL 2.2 1.2* 1.8 1.3*   < > 2.2   PHOSPHORUS mg/dL 3.3  --  2.9  --   --  6.4*   GLUCOSE mg/dL 173*  --  198* 380*   < > 143*    < > = values in this interval not displayed.     Estimated Creatinine Clearance: 136.7 mL/min (by C-G formula based on SCr of 0.87 mg/dL).    Results from last 7 days   Lab Units 01/14/25  0323   PH, ARTERIAL pH units 7.469*   PCO2, ARTERIAL mm Hg 44.2   PO2 ART mm Hg 71.5*       Images:   Abdominal x-ray reviewed and Showed tube is possibly within the antrum of stomach.      Chest x-ray reviewed and endotracheal tube above anna.  NG tube of the diaphragm.  Bilateral hazy opacities with left lower lobe atelectasis still persisting.      Patient's results and images.     Assessment & Plan   Impression        Acute respiratory failure with hypercapnia    MARIAA (acute kidney injury)    Type 2 diabetes mellitus with hyperglycemia    Sepsis       Plan        1.  Patient presented here with respiratory failure currently on mechanical ventilation.  Patient found to have MRSA pneumonia.  Started on Zyvox.  1 out of 2 blood culture positive for coag negative staph.  Repeat cultures have been negative likely contaminant.  Patient continues to have fevers.  Blood cultures repeated.  Central line discontinued.  Appreciate ID team's help and added cefepime empirically.  Await final cultures.  Patient has arterial line which is 4 days old and we will go ahead and discontinue that today and monitor closely.  2.  Continue heparin drip with history of atrial fibrillation and factor V Leiden with previous thromboembolic disease.  Seems to be tolerating well.  No acute bleed noted.  Hemoglobin low but stable.  3.  Continue current mechanical ventilation.  Ventilator adjustments made after reviewing blood gases.  Bronchoscopy done yesterday revealed large amount of secretions in the left lung which were suctioned out.  Still has significant atelectasis and consolidation on that side.  Will continue pulmonary toilet.  Will consider MetaNeb.  4.  Renal function improving.  Nephrology team is following.  Continuing on diuresis.  Will continue to follow electrolytes and replace aggressively per ICU protocol.  5.  Not having any bowel movements.  Will try enema again.  Continue Relistor.  Continue with bowel regimen.  6.  Enteral nutrition as tolerated.    7.  Continue insulin drip to manage hyperglycemia.  8.  GI prophylaxis.  9.  Light sedation for comfort and ventilator synchrony.    Continue supportive care.  Patient remains at high risk of decline.  Family updated at bedside.  Check labs again in the morning.    Plan of care and goals reviewed with multidisciplinary/antibiotic stewardship team during rounds.   I discussed the patient's findings and my recommendations with family and nursing staff     Time spent Critical care 32 min (exclusive of procedure time)  including high complexity decision making to assess, manipulate, and support vital organ system failure in this  individual who has impairment of one or more vital organ systems such that there is a high probability of imminent or life threatening deterioration in the patient’s condition.  Above documentation reviewed and reflect accurate information as of 1/15/2025 including copied elements of the note.       Vince Toscano MD, [facility]  Pulmonary, Critical care and Sleep Medicine

## 2025-01-15 NOTE — NURSING NOTE
[facility] follow-up for left flank wound and reassessment of sacrococcygeal wound.    Left flank wound presentation remains relatively unchanged when compared to previous photo.  I suspect there is a moisture and pressure component (from the patients body habitus) when the skin folds are touching in addition to the patient weight. Will order VASHE soaks and venelex to be applied BID. Please cover this area with 2-3 medium sized optifoam dressings.         In addition, the patient sacral coccygeal area does appear worse when compared to previous photos. The purplish discoloration is slow to becki at this time. There are areas of friction damage at  this site as well. We will watch this closely as it could signs of a developing deep tissue pressure injury. For this wound we will treat similarly as the flank wound -- vashe soak for 3 min followed by application of venelex to the site. Allevyn dressing to secure.         Continue to turn patient q 2 hours. Use two foam wedges we turning. Elevate heels off bed with heel boots.     Miles Tanner RN, BSN, CCRN, CWOCN  Wound, Ostomy and Continence (WOC) Department  [facility]

## 2025-01-16 ENCOUNTER — APPOINTMENT (OUTPATIENT)
Dept: GENERAL RADIOLOGY | Facility: HOSPITAL | Age: 40
DRG: 870 | End: 2025-01-16
Payer: COMMERCIAL

## 2025-01-16 LAB
ANION GAP SERPL CALCULATED.3IONS-SCNC: 17 MMOL/L (ref 5–15)
ARTERIAL PATENCY WRIST A: ABNORMAL
ATMOSPHERIC PRESS: ABNORMAL MM[HG]
BACTERIA BLD CULT: ABNORMAL
BASE EXCESS BLDA CALC-SCNC: -1.4 MMOL/L (ref 0–2)
BASOPHILS # BLD AUTO: 0.02 10*3/MM3 (ref 0–0.2)
BASOPHILS NFR BLD AUTO: 0.4 % (ref 0–1.5)
BDY SITE: ABNORMAL
BODY TEMPERATURE: 37
BOTTLE TYPE: ABNORMAL
BUN SERPL-MCNC: 31 MG/DL (ref 6–20)
BUN/CREAT SERPL: 37.8 (ref 7–25)
CALCIUM SPEC-SCNC: 9.5 MG/DL (ref 8.6–10.5)
CHLORIDE SERPL-SCNC: 95 MMOL/L (ref 98–107)
CO2 BLDA-SCNC: 25.9 MMOL/L (ref 22–33)
CO2 SERPL-SCNC: 24 MMOL/L (ref 22–29)
COHGB MFR BLD: 1.5 % (ref 0–2)
CREAT SERPL-MCNC: 0.82 MG/DL (ref 0.57–1)
DEPRECATED RDW RBC AUTO: 58.1 FL (ref 37–54)
EGFRCR SERPLBLD CKD-EPI 2021: 94 ML/MIN/1.73
EOSINOPHIL # BLD AUTO: 0.14 10*3/MM3 (ref 0–0.4)
EOSINOPHIL NFR BLD AUTO: 2.7 % (ref 0.3–6.2)
EPAP: 0
ERYTHROCYTE [DISTWIDTH] IN BLOOD BY AUTOMATED COUNT: 17.2 % (ref 12.3–15.4)
GLUCOSE BLDC GLUCOMTR-MCNC: 130 MG/DL (ref 70–130)
GLUCOSE BLDC GLUCOMTR-MCNC: 132 MG/DL (ref 70–130)
GLUCOSE BLDC GLUCOMTR-MCNC: 137 MG/DL (ref 70–130)
GLUCOSE BLDC GLUCOMTR-MCNC: 137 MG/DL (ref 70–130)
GLUCOSE BLDC GLUCOMTR-MCNC: 141 MG/DL (ref 70–130)
GLUCOSE BLDC GLUCOMTR-MCNC: 149 MG/DL (ref 70–130)
GLUCOSE BLDC GLUCOMTR-MCNC: 152 MG/DL (ref 70–130)
GLUCOSE BLDC GLUCOMTR-MCNC: 154 MG/DL (ref 70–130)
GLUCOSE BLDC GLUCOMTR-MCNC: 155 MG/DL (ref 70–130)
GLUCOSE BLDC GLUCOMTR-MCNC: 156 MG/DL (ref 70–130)
GLUCOSE BLDC GLUCOMTR-MCNC: 158 MG/DL (ref 70–130)
GLUCOSE BLDC GLUCOMTR-MCNC: 160 MG/DL (ref 70–130)
GLUCOSE BLDC GLUCOMTR-MCNC: 165 MG/DL (ref 70–130)
GLUCOSE BLDC GLUCOMTR-MCNC: 171 MG/DL (ref 70–130)
GLUCOSE BLDC GLUCOMTR-MCNC: 182 MG/DL (ref 70–130)
GLUCOSE BLDC GLUCOMTR-MCNC: 212 MG/DL (ref 70–130)
GLUCOSE BLDC GLUCOMTR-MCNC: 248 MG/DL (ref 70–130)
GLUCOSE BLDC GLUCOMTR-MCNC: 250 MG/DL (ref 70–130)
GLUCOSE SERPL-MCNC: 241 MG/DL (ref 65–99)
HCO3 BLDA-SCNC: 24.5 MMOL/L (ref 20–26)
HCT VFR BLD AUTO: 25.9 % (ref 34–46.6)
HCT VFR BLD CALC: 25.5 % (ref 38–51)
HGB BLD-MCNC: 7.9 G/DL (ref 12–15.9)
HGB BLDA-MCNC: 8.3 G/DL (ref 14–18)
IMM GRANULOCYTES # BLD AUTO: 0.04 10*3/MM3 (ref 0–0.05)
IMM GRANULOCYTES NFR BLD AUTO: 0.8 % (ref 0–0.5)
INHALED O2 CONCENTRATION: 65 %
IPAP: 0
LYMPHOCYTES # BLD AUTO: 0.57 10*3/MM3 (ref 0.7–3.1)
LYMPHOCYTES NFR BLD AUTO: 11 % (ref 19.6–45.3)
MAGNESIUM SERPL-MCNC: 1.3 MG/DL (ref 1.6–2.6)
MCH RBC QN AUTO: 29 PG (ref 26.6–33)
MCHC RBC AUTO-ENTMCNC: 30.5 G/DL (ref 31.5–35.7)
MCV RBC AUTO: 95.2 FL (ref 79–97)
METHGB BLD QL: 0.6 % (ref 0–1.5)
MODALITY: ABNORMAL
MONOCYTES # BLD AUTO: 0.36 10*3/MM3 (ref 0.1–0.9)
MONOCYTES NFR BLD AUTO: 7 % (ref 5–12)
NEUTROPHILS NFR BLD AUTO: 4.04 10*3/MM3 (ref 1.7–7)
NEUTROPHILS NFR BLD AUTO: 78.1 % (ref 42.7–76)
NRBC BLD AUTO-RTO: 0 /100 WBC (ref 0–0.2)
OXYHGB MFR BLDV: 95.1 % (ref 94–99)
PAW @ PEAK INSP FLOW SETTING VENT: 0 CMH2O
PCO2 BLDA: 45.9 MM HG (ref 35–45)
PCO2 TEMP ADJ BLD: 45.9 MM HG (ref 35–45)
PEEP RESPIRATORY: 8 CM[H2O]
PH BLDA: 7.34 PH UNITS (ref 7.35–7.45)
PH, TEMP CORRECTED: 7.34 PH UNITS
PHOSPHATE SERPL-MCNC: 3.9 MG/DL (ref 2.5–4.5)
PLATELET # BLD AUTO: 141 10*3/MM3 (ref 140–450)
PMV BLD AUTO: 10.3 FL (ref 6–12)
PO2 BLDA: 92.9 MM HG (ref 83–108)
PO2 TEMP ADJ BLD: 92.9 MM HG (ref 83–108)
POTASSIUM SERPL-SCNC: 3.4 MMOL/L (ref 3.5–5.2)
POTASSIUM SERPL-SCNC: 3.9 MMOL/L (ref 3.5–5.2)
RBC # BLD AUTO: 2.72 10*6/MM3 (ref 3.77–5.28)
SODIUM SERPL-SCNC: 136 MMOL/L (ref 136–145)
TOTAL RATE: 24 BREATHS/MINUTE
UFH PPP CHRO-ACNC: 0.1 IU/ML (ref 0.3–0.7)
UFH PPP CHRO-ACNC: 0.39 IU/ML (ref 0.3–0.7)
UFH PPP CHRO-ACNC: 0.41 IU/ML (ref 0.3–0.7)
VENTILATOR MODE: ABNORMAL
VT ON VENT VENT: 0.38 ML
WBC NRBC COR # BLD AUTO: 5.17 10*3/MM3 (ref 3.4–10.8)

## 2025-01-16 PROCEDURE — 25010000002 BUMETANIDE PER 0.5 MG: Performed by: INTERNAL MEDICINE

## 2025-01-16 PROCEDURE — 99291 CRITICAL CARE FIRST HOUR: CPT | Performed by: INTERNAL MEDICINE

## 2025-01-16 PROCEDURE — 36600 WITHDRAWAL OF ARTERIAL BLOOD: CPT

## 2025-01-16 PROCEDURE — 94003 VENT MGMT INPAT SUBQ DAY: CPT

## 2025-01-16 PROCEDURE — 25010000002 HEPARIN (PORCINE) 25000-0.45 UT/250ML-% SOLUTION

## 2025-01-16 PROCEDURE — 25810000003 SODIUM CHLORIDE 0.9 % SOLUTION 250 ML FLEX CONT: Performed by: INTERNAL MEDICINE

## 2025-01-16 PROCEDURE — 97610 LOW FREQUENCY NON-THERMAL US: CPT

## 2025-01-16 PROCEDURE — 84100 ASSAY OF PHOSPHORUS: CPT | Performed by: INTERNAL MEDICINE

## 2025-01-16 PROCEDURE — 25010000002 FENTANYL 10 MCG/1 ML NS: Performed by: INTERNAL MEDICINE

## 2025-01-16 PROCEDURE — 82375 ASSAY CARBOXYHB QUANT: CPT

## 2025-01-16 PROCEDURE — 25010000002 CEFEPIME PER 500 MG: Performed by: INTERNAL MEDICINE

## 2025-01-16 PROCEDURE — 85520 HEPARIN ASSAY: CPT

## 2025-01-16 PROCEDURE — 25010000002 MAGNESIUM SULFATE 2 GM/50ML SOLUTION: Performed by: PHYSICIAN ASSISTANT

## 2025-01-16 PROCEDURE — 80048 BASIC METABOLIC PNL TOTAL CA: CPT | Performed by: INTERNAL MEDICINE

## 2025-01-16 PROCEDURE — 83050 HGB METHEMOGLOBIN QUAN: CPT

## 2025-01-16 PROCEDURE — 94799 UNLISTED PULMONARY SVC/PX: CPT

## 2025-01-16 PROCEDURE — B548ZZA ULTRASONOGRAPHY OF SUPERIOR VENA CAVA, GUIDANCE: ICD-10-PCS | Performed by: INTERNAL MEDICINE

## 2025-01-16 PROCEDURE — 36556 INSERT NON-TUNNEL CV CATH: CPT | Performed by: INTERNAL MEDICINE

## 2025-01-16 PROCEDURE — 25010000002 MIDAZOLAM PER 1 MG: Performed by: INTERNAL MEDICINE

## 2025-01-16 PROCEDURE — 82805 BLOOD GASES W/O2 SATURATION: CPT

## 2025-01-16 PROCEDURE — 25010000002 POTASSIUM CHLORIDE PER 2 MEQ: Performed by: PHYSICIAN ASSISTANT

## 2025-01-16 PROCEDURE — 97163 PT EVAL HIGH COMPLEX 45 MIN: CPT

## 2025-01-16 PROCEDURE — 84132 ASSAY OF SERUM POTASSIUM: CPT | Performed by: PHYSICIAN ASSISTANT

## 2025-01-16 PROCEDURE — 25010000002 LINEZOLID 600 MG/300ML SOLUTION: Performed by: INTERNAL MEDICINE

## 2025-01-16 PROCEDURE — 02HV33Z INSERTION OF INFUSION DEVICE INTO SUPERIOR VENA CAVA, PERCUTANEOUS APPROACH: ICD-10-PCS | Performed by: INTERNAL MEDICINE

## 2025-01-16 PROCEDURE — 82948 REAGENT STRIP/BLOOD GLUCOSE: CPT

## 2025-01-16 PROCEDURE — 25010000002 FENTANYL CITRATE (PF) 50 MCG/ML SOLUTION: Performed by: INTERNAL MEDICINE

## 2025-01-16 PROCEDURE — 85025 COMPLETE CBC W/AUTO DIFF WBC: CPT | Performed by: INTERNAL MEDICINE

## 2025-01-16 PROCEDURE — 83735 ASSAY OF MAGNESIUM: CPT | Performed by: INTERNAL MEDICINE

## 2025-01-16 PROCEDURE — 71045 X-RAY EXAM CHEST 1 VIEW: CPT

## 2025-01-16 RX ORDER — MIDAZOLAM HYDROCHLORIDE 1 MG/ML
6 INJECTION, SOLUTION INTRAMUSCULAR; INTRAVENOUS ONCE
Status: COMPLETED | OUTPATIENT
Start: 2025-01-16 | End: 2025-01-16

## 2025-01-16 RX ORDER — FENTANYL CITRATE 50 UG/ML
100 INJECTION, SOLUTION INTRAMUSCULAR; INTRAVENOUS
Status: DISCONTINUED | OUTPATIENT
Start: 2025-01-16 | End: 2025-01-16

## 2025-01-16 RX ORDER — POTASSIUM CHLORIDE 29.8 MG/ML
20 INJECTION INTRAVENOUS
Status: COMPLETED | OUTPATIENT
Start: 2025-01-16 | End: 2025-01-16

## 2025-01-16 RX ORDER — KETAMINE HYDROCHLORIDE 100 MG/ML
300 INJECTION, SOLUTION INTRAMUSCULAR; INTRAVENOUS ONCE
Status: COMPLETED | OUTPATIENT
Start: 2025-01-16 | End: 2025-01-16

## 2025-01-16 RX ORDER — FENTANYL CITRATE 50 UG/ML
100 INJECTION, SOLUTION INTRAMUSCULAR; INTRAVENOUS ONCE
Status: COMPLETED | OUTPATIENT
Start: 2025-01-16 | End: 2025-01-16

## 2025-01-16 RX ORDER — MIDAZOLAM HYDROCHLORIDE 5 MG/ML
8 INJECTION INTRAMUSCULAR; INTRAVENOUS ONCE
Status: COMPLETED | OUTPATIENT
Start: 2025-01-16 | End: 2025-01-16

## 2025-01-16 RX ORDER — MAGNESIUM SULFATE HEPTAHYDRATE 40 MG/ML
2 INJECTION, SOLUTION INTRAVENOUS
Status: COMPLETED | OUTPATIENT
Start: 2025-01-16 | End: 2025-01-16

## 2025-01-16 RX ORDER — MIDAZOLAM HYDROCHLORIDE 1 MG/ML
8 INJECTION, SOLUTION INTRAMUSCULAR; INTRAVENOUS ONCE
Status: DISCONTINUED | OUTPATIENT
Start: 2025-01-16 | End: 2025-01-16

## 2025-01-16 RX ADMIN — FENTANYL CITRATE 100 MCG: 50 INJECTION, SOLUTION INTRAMUSCULAR; INTRAVENOUS at 03:57

## 2025-01-16 RX ADMIN — MIDAZOLAM HYDROCHLORIDE 8 MG: 5 INJECTION, SOLUTION INTRAMUSCULAR; INTRAVENOUS at 03:55

## 2025-01-16 RX ADMIN — MIDAZOLAM HYDROCHLORIDE 6 MG: 1 INJECTION, SOLUTION INTRAMUSCULAR; INTRAVENOUS at 04:40

## 2025-01-16 RX ADMIN — POTASSIUM CHLORIDE 20 MEQ: 400 INJECTION, SOLUTION INTRAVENOUS at 10:28

## 2025-01-16 RX ADMIN — DEXMEDETOMIDINE 1.2 MCG/KG/HR: 200 INJECTION, SOLUTION INTRAVENOUS at 10:25

## 2025-01-16 RX ADMIN — MAGNESIUM SULFATE HEPTAHYDRATE 2 G: 2 INJECTION, SOLUTION INTRAVENOUS at 08:16

## 2025-01-16 RX ADMIN — MIDAZOLAM HYDROCHLORIDE 6 MG: 1 INJECTION, SOLUTION INTRAMUSCULAR; INTRAVENOUS at 04:09

## 2025-01-16 RX ADMIN — CEFEPIME 2000 MG: 2 INJECTION, POWDER, FOR SOLUTION INTRAVENOUS at 06:15

## 2025-01-16 RX ADMIN — LINEZOLID 600 MG: 600 INJECTION, SOLUTION INTRAVENOUS at 20:33

## 2025-01-16 RX ADMIN — DEXMEDETOMIDINE 1.5 MCG/KG/HR: 200 INJECTION, SOLUTION INTRAVENOUS at 06:32

## 2025-01-16 RX ADMIN — INSULIN HUMAN 39.1 UNITS/HR: 1 INJECTION, SOLUTION INTRAVENOUS at 08:25

## 2025-01-16 RX ADMIN — INSULIN HUMAN 15.1 UNITS/HR: 1 INJECTION, SOLUTION INTRAVENOUS at 05:46

## 2025-01-16 RX ADMIN — FAMOTIDINE 20 MG: 10 INJECTION, SOLUTION INTRAVENOUS at 20:33

## 2025-01-16 RX ADMIN — Medication 1 APPLICATION: at 20:34

## 2025-01-16 RX ADMIN — FENTANYL CITRATE 100 MCG: 50 INJECTION, SOLUTION INTRAMUSCULAR; INTRAVENOUS at 04:09

## 2025-01-16 RX ADMIN — HEPARIN SODIUM 25 UNITS/KG/HR: 10000 INJECTION, SOLUTION INTRAVENOUS at 16:50

## 2025-01-16 RX ADMIN — HEPARIN SODIUM 25 UNITS/KG/HR: 10000 INJECTION, SOLUTION INTRAVENOUS at 01:04

## 2025-01-16 RX ADMIN — CHLORHEXIDINE GLUCONATE 0.12% ORAL RINSE 15 ML: 1.2 LIQUID ORAL at 08:15

## 2025-01-16 RX ADMIN — INSULIN HUMAN 18.2 UNITS/HR: 1 INJECTION, SOLUTION INTRAVENOUS at 16:50

## 2025-01-16 RX ADMIN — CEFEPIME 2000 MG: 2 INJECTION, POWDER, FOR SOLUTION INTRAVENOUS at 13:50

## 2025-01-16 RX ADMIN — DEXMEDETOMIDINE 1.1 MCG/KG/HR: 200 INJECTION, SOLUTION INTRAVENOUS at 22:54

## 2025-01-16 RX ADMIN — Medication 300 MCG/HR: at 01:01

## 2025-01-16 RX ADMIN — HEPARIN SODIUM 25 UNITS/KG/HR: 10000 INJECTION, SOLUTION INTRAVENOUS at 23:11

## 2025-01-16 RX ADMIN — POTASSIUM CHLORIDE 20 MEQ: 400 INJECTION, SOLUTION INTRAVENOUS at 08:17

## 2025-01-16 RX ADMIN — BUMETANIDE 1 MG: 0.25 INJECTION INTRAMUSCULAR; INTRAVENOUS at 08:16

## 2025-01-16 RX ADMIN — INSULIN HUMAN 12.3 UNITS/HR: 1 INJECTION, SOLUTION INTRAVENOUS at 23:11

## 2025-01-16 RX ADMIN — LINEZOLID 600 MG: 600 INJECTION, SOLUTION INTRAVENOUS at 08:17

## 2025-01-16 RX ADMIN — DEXMEDETOMIDINE 1 MCG/KG/HR: 200 INJECTION, SOLUTION INTRAVENOUS at 16:29

## 2025-01-16 RX ADMIN — SENNOSIDES AND DOCUSATE SODIUM 2 TABLET: 50; 8.6 TABLET ORAL at 20:33

## 2025-01-16 RX ADMIN — Medication 1 APPLICATION: at 08:17

## 2025-01-16 RX ADMIN — SENNOSIDES AND DOCUSATE SODIUM 2 TABLET: 50; 8.6 TABLET ORAL at 08:16

## 2025-01-16 RX ADMIN — NYSTATIN: 100000 POWDER TOPICAL at 08:15

## 2025-01-16 RX ADMIN — KETAMINE HYDROCHLORIDE 300 MG: 100 INJECTION, SOLUTION, CONCENTRATE INTRAMUSCULAR; INTRAVENOUS at 03:52

## 2025-01-16 RX ADMIN — MAGNESIUM SULFATE HEPTAHYDRATE 2 G: 2 INJECTION, SOLUTION INTRAVENOUS at 12:34

## 2025-01-16 RX ADMIN — FAMOTIDINE 20 MG: 10 INJECTION, SOLUTION INTRAVENOUS at 08:16

## 2025-01-16 RX ADMIN — INSULIN HUMAN 14.8 UNITS/HR: 1 INJECTION, SOLUTION INTRAVENOUS at 10:34

## 2025-01-16 RX ADMIN — HEPARIN SODIUM 25 UNITS/KG/HR: 10000 INJECTION, SOLUTION INTRAVENOUS at 10:35

## 2025-01-16 RX ADMIN — MAGNESIUM SULFATE HEPTAHYDRATE 2 G: 2 INJECTION, SOLUTION INTRAVENOUS at 10:28

## 2025-01-16 RX ADMIN — POLYETHYLENE GLYCOL 3350 17 G: 17 POWDER, FOR SOLUTION ORAL at 08:16

## 2025-01-16 RX ADMIN — CEFEPIME 2000 MG: 2 INJECTION, POWDER, FOR SOLUTION INTRAVENOUS at 22:12

## 2025-01-16 RX ADMIN — CHLORHEXIDINE GLUCONATE 0.12% ORAL RINSE 15 ML: 1.2 LIQUID ORAL at 20:33

## 2025-01-16 RX ADMIN — NYSTATIN: 100000 POWDER TOPICAL at 20:34

## 2025-01-16 NOTE — THERAPY EVALUATION
Acute Care - Wound/Debridement Initial Evaluation  [facility]     Patient Name: Natalia Arzate  : 1985  MRN: [id number]  Today's Date: 2025                Admit Date: 2025    Visit Dx:    ICD-10-CM ICD-9-CM   1. Respiratory acidosis with metabolic acidosis  E87.4 276.3   2. Acute respiratory failure with hypoxia  J96.01 518.81   3. MARIAA (acute kidney injury)  N17.9 584.9       Patient Active Problem List   Diagnosis    Nephrolithiasis    Ovarian teratoma, right    Other chronic pain    Pelvic pain    History of DVT in adulthood    History of pulmonary embolism    Ureteral calculus    Ischemic cerebrovascular accident (CVA)    Primary hypertension    Left lumbar radiculopathy    PTSD (post-traumatic stress disorder)    MARIAA (acute kidney injury)    Anemia    Dyslipidemia    Hypothyroid    Other secondary gout, multiple sites    Factor 5 Leiden mutation, heterozygous    Vitamin D deficiency    Vitamin B 12 deficiency    Type 2 diabetes mellitus with hyperglycemia    Hoarseness, persistent    Ureterolithiasis    Acute cystitis without hematuria    Hepatic steatosis    Right flank pain, chronic    Detrusor instability of bladder    Frequency of micturition    Acute respiratory failure with hypercapnia    Hyperkalemia    Sepsis    Respiratory acidosis with metabolic acidosis    Acute respiratory failure        Past Medical History:   Diagnosis Date    A-fib     Abnormal ECG     Anemia     Anxiety     Asthma     Cancer     Ovarian    Depression     Diabetes mellitus     DVT (deep venous thrombosis)     Factor 5 Leiden mutation, heterozygous     Fibroid     GERD (gastroesophageal reflux disease)     Gout     H/O abdominal abscess     History of sepsis     History of transfusion     Hyperlipidemia     Hypothyroid     Kidney stone     Migraines     Neuropathy     Ovarian cancer 2021    Ovarian cyst     PE (pulmonary embolism)     Polycystic ovary syndrome     Preeclampsia     Rh incompatibility      Stroke     TIA (transient ischemic attack)     Urinary tract infection     Varicella         Past Surgical History:   Procedure Laterality Date    ABDOMINAL SURGERY      CARDIAC CATHETERIZATION       SECTION      CHOLECYSTECTOMY      COLONOSCOPY      ENDOSCOPY      EXTRACORPOREAL SHOCK WAVE LITHOTRIPSY (ESWL) Left 10/22/2021    Procedure: EXTRACORPOREAL SHOCKWAVE LITHOTRIPSY;  Surgeon: Milan Motley MD;  Location: [facility] OR;  Service: Urology;  Laterality: Left;    HYSTERECTOMY  2017    ovarian ca    INSERT CENTRAL LINE AT BEDSIDE  2025    LITHOTRIPSY      NEPHRECTOMY Left 10/2022    RIGHT OOPHORECTOMY      URETEROSCOPY LASER LITHOTRIPSY WITH STENT INSERTION Left 10/01/2021    Procedure: URETEROSCOPY WITH STENT PLACEMENT;  Surgeon: Milan Motley MD;  Location: [facility] OR;  Service: Urology;  Laterality: Left;    URETEROSCOPY LASER LITHOTRIPSY WITH STENT INSERTION Left 2022    Procedure: URETEROSCOPY STENT REMOVAL;  Surgeon: Milan Motley MD;  Location: [facility] OR;  Service: Urology;  Laterality: Left;    URETEROSCOPY LASER LITHOTRIPSY WITH STENT INSERTION Left 2022    Procedure: CYSTOSCOPY RETROGRADE LEFT WITH STENT PLACEMENT;  Surgeon: Milan Motley MD;  Location: [facility] OR;  Service: Urology;  Laterality: Left;           Wound 25 0400 Left lateral flank (Active)   Dressing Appearance open to air 25 1030   Closure Open to air 25 0400   Base maroon/purple 25 1030   Periwound dry;intact 25 1030   Periwound Temperature warm 25 1030   Periwound Skin Turgor soft 25 1030   Edges irregular 25 1030   Wound Length (cm) 3 cm 25 1030   Wound Width (cm) 13.5 cm 25 1030   Wound Depth (cm) 0.1 cm 25 1030   Wound Surface Area (cm^2) 40.5 cm^2 25 1030   Wound Volume (cm^3) 4.05 cm^3 25 1030   Drainage Amount none 25 1030   Care, Wound cleansed with;wound cleanser;ultrasound therapy, non  contact low frequency 01/16/25 1030   Dressing Care petroleum-based 01/16/25 1030   Periwound Care dry periwound area maintained 01/16/25 1030       Wound 01/15/25 0314 Left gluteal blisters (Active)   Dressing Appearance open to air 01/15/25 1800   Closure Open to air 01/16/25 0400   Periwound Care dry periwound area maintained 01/16/25 0000         WOUND DEBRIDEMENT                     PT Assessment (Last 12 Hours)       PT Evaluation and Treatment       Row Name 01/16/25 1030          Physical Therapy Time and Intention    Subjective Information --  pt unable to verb  -     Document Type evaluation;wound care;therapy note (daily note)  -     Mode of Treatment individual therapy;physical therapy  -       Row Name 01/16/25 1030          General Information    Patient Profile Reviewed yes  -     Patient Observations cooperative;unable to respond  -     Pertinent History of Current Functional Problem possible DTPI to L lat back  -     Risks Reviewed patient:;increased discomfort  -     Benefits Reviewed patient:;improve skin integrity  -     Barriers to Rehab medically complex;previous functional deficit;physical barrier  -       Row Name 01/16/25 1030          Pain    Pretreatment Pain Rating 0/10 - no pain  -     Posttreatment Pain Rating 0/10 - no pain  -       Row Name 01/16/25 1030          Cognition    Affect/Mental Status (Cognition) unable/difficult to assess  pt intubated  -       Row Name 01/16/25 1030          Wound 01/05/25 0400 Left lateral flank    Wound - Properties Group Placement Date: 01/05/25  -BC Placement Time: 0400  -BC Side: Left  -BC Orientation: lateral  -BC Location: flank  -BC    Dressing Appearance open to air  -     Base maroon/purple  -MF     Periwound dry;intact  -MF     Periwound Temperature warm  -MF     Periwound Skin Turgor soft  -MF     Edges irregular  -MF     Wound Length (cm) 3 cm  -MF     Wound Width (cm) 13.5 cm  -MF     Wound Depth (cm) 0.1 cm  -MF      Wound Surface Area (cm^2) 40.5 cm^2  -MF     Wound Volume (cm^3) 4.05 cm^3  -MF     Drainage Amount none  -MF     Care, Wound cleansed with;wound cleanser;ultrasound therapy, non contact low frequency  7 min MIST with Vashe  -MF     Dressing Care petroleum-based  venelex  -MF     Periwound Care dry periwound area maintained  -MF     Retired Wound - Properties Group Placement Date: 01/05/25 -BC Placement Time: 0400 -BC Side: Left  -BC Orientation: lateral  -BC Location: flank  -BC    Retired Wound - Properties Group Placement Date: 01/05/25 -BC Placement Time: 0400 -BC Side: Left  -BC Orientation: lateral  -BC Location: flank  -BC    Retired Wound - Properties Group Date first assessed: 01/05/25 -BC Time first assessed: 0400 -BC Side: Left  -BC Location: flank  -BC      Row Name             Wound 01/15/25 0314 Left gluteal blisters    Wound - Properties Group Placement Date: 01/15/25  -SE Placement Time: 0314  -SE Side: Left  -SE Location: gluteal  -SE Primary Wound Type: Blisters  -SE Type: blisters  -SE Present on Original Admission: N  -SE    Retired Wound - Properties Group Placement Date: 01/15/25  -SE Placement Time: 0314  -SE Present on Original Admission: N  -SE Side: Left  -SE Location: gluteal  -SE Primary Wound Type: Blisters  -SE Type: blisters  -SE    Retired Wound - Properties Group Placement Date: 01/15/25  -SE Placement Time: 0314  -SE Present on Original Admission: N  -SE Side: Left  -SE Location: gluteal  -SE Primary Wound Type: Blisters  -SE Type: blisters  -SE    Retired Wound - Properties Group Date first assessed: 01/15/25  -SE Time first assessed: 0314  -SE Present on Original Admission: N  -SE Side: Left  -SE Location: gluteal  -SE Primary Wound Type: Blisters  -SE Type: blisters  -SE      Row Name 01/16/25 1030          Coping    Observed Emotional State calm  -MF     Verbalized Emotional State other (see comments)  -MF     Trust Relationship/Rapport care explained  -MF       Row  Name 01/16/25 1030          Plan of Care Review    Plan of Care Reviewed With other (see comments)  RN  -     Outcome Evaluation Pt present with complex wound to L lateral back.  PT was able to MIST the area to help decrease inflammation, improve skin integrity and decrease further potential breakdown. PT will cont with MIST 2-3x/week to help further promote wound healing.  -       Row Name 01/16/25 1030          Positioning and Restraints    Pre-Treatment Position in bed  -     Post Treatment Position bed  -     In Bed supine;with nsg  -       Row Name 01/16/25 1030          Therapy Assessment/Plan (PT)    Patient/Family Therapy Goals Statement (PT) Pt unable to verb  -     PT Diagnosis (PT) DTPI to L back  -     Rehab Potential (PT) fair  -     Criteria for Skilled Interventions Met (PT) yes;meets criteria;skilled treatment is necessary  -     Predicted Duration of Therapy Intervention (PT) 10 days  -       Row Name 01/16/25 1030          PT Evaluation Complexity    History, PT Evaluation Complexity 3 or more personal factors and/or comorbidities  -     Examination of Body Systems (PT Eval Complexity) total of 4 or more elements  -     Clinical Presentation (PT Evaluation Complexity) unstable  -     Clinical Decision Making (PT Evaluation Complexity) high complexity  -     Overall Complexity (PT Evaluation Complexity) high complexity  -       Row Name 01/16/25 1030          Therapy Plan Review/Discharge Plan (PT)    Therapy Plan Review (PT) evaluation/treatment results reviewed;care plan/treatment goals reviewed;risks/benefits reviewed;current/potential barriers reviewed;participants included  dicussed with RN  -       Row Name 01/16/25 1030          Physical Therapy Goals    Wound Management Goal Selection (PT) wound management, PT goal 1;wound management, PT goal 2  -       Row Name 01/16/25 1030          Wound Management Goal 1 (PT)    Wound Management Goal (Wound Goal 1, PT)  Decrease L lat back wound size by 10% as evidence of wound healing.  -     Time Frame (Wound Goal 1, PT) 5-7 days  -       Row Name 01/16/25 1030          Wound Management Goal 2 (PT)    Wound Management Goal (Wound Goal 2, PT) Decrease L lat back wound size by 25% as evidence of wound healing.  -               User Key  (r) = Recorded By, (t) = Taken By, (c) = Cosigned By      Initials Name Provider Type     Ted Carbone, PT Physical Therapist    Chary Bloom RN Registered Nurse    Rajani Montilla RN Registered Nurse                  Physical Therapy Education        No education to display                    Recommendation and Plan  Planned Therapy Interventions (PT): wound care  Plan of Care Reviewed With: other (see comments) (RN)           Outcome Evaluation: Pt present with complex wound to L lateral back.  PT was able to MIST the area to help decrease inflammation, improve skin integrity and decrease further potential breakdown. PT will cont with MIST 2-3x/week to help further promote wound healing.  Plan of Care Reviewed With: other (see comments) (RN)            Time Calculation   PT Charges       Row Name 01/16/25 1030             Time Calculation    Start Time 1030  -MF      PT Goal Re-Cert Due Date 01/26/25  -         Untimed Charges    PT Eval/Re-eval Minutes 35  -MF      Wound Care 20494 NLFU Mist  -MF      06471-CZTO Mist 25  -MF         Total Minutes    Untimed Charges Total Minutes 60  -MF       Total Minutes 60  -MF                User Key  (r) = Recorded By, (t) = Taken By, (c) = Cosigned By      Initials Name Provider Type    Ted Prescott, PT Physical Therapist                            PT G-Codes  AM-PAC 6 Clicks Score (PT): 6       Ted Carbone, PT  1/16/2025

## 2025-01-16 NOTE — PROGRESS NOTES
Pharmacy to Dose Heparin Infusion Note  Natalia Arzate is a 39 y.o. female receiving heparin infusion.     Therapy for (VTE/Cardiac): VTE  Patient Weight: 159 kg  Initial Bolus (Y/N): No  Any Bolus (Y/N): Yes  Signs or Symptoms of Bleeding: None currently - has been held previously in admission for dropping H/H.     VTE (PE/DVT)  Initial rate: 18 units/kg/hr (Max 1,500 units/hr)    Anti-Xa Bolus   Dose Infusion Hold   Time Infusion Rate Change (units/kg/hr) Repeat  Anti-Xa   < 0.11 50 units/kg  (4000 units Max) None Increase by  4 units/kg/hr 6 hours   0.11 - 0.19 25 units/kg  (2000 units Max) None Increase by  3 units/kg/hr 6 hours   0.2 - 0.29 0 None Increase by  2 units/kg/hr 6 hours   0.3 - 0.7 0 None No Change 6 hours (after 2 consecutive levels in range check qAM)   0.71 - 0.8 0 None Decrease by  1 units/kg/hr 6 hours   0.81 - 0.9 0 None Decrease by  2 units/kg/hr 6 hours   0.91 - 1 0 60 minutes Decrease by  3 units/kg/hr 6 hours   >1 0 Hold  After Anti-Xa less than 0.7 decrease previous rate by  4 units/kg/hr  Every 2 hours until Anti-Xa less than 0.7 then when infusion restarts in 6 hours     Results from last 7 days   Lab Units 01/16/25  0558 01/15/25  0430 01/14/25  0451   HEMOGLOBIN g/dL 7.9* 8.1* 7.9*   HEMATOCRIT % 25.9* 26.1* 24.9*   PLATELETS 10*3/mm3 141 154 160       Date   Time   Anti-Xa Current Rate (units/kg/hr) Bolus   (units) Rate Change   (units/kg/hr) New Rate (units/kg/hr) Repeat  Anti-Xa Comments /  Pump Check    1/2 1500 pending -- -- +9 9 0000 D/w RN; will await labs, but expect this to be an aPTT assay    1/2 N/a N/a 9 -- -- 9 2100 Baseline anti-Xa 0.1, re-timed next check for 2100.    1/2 2021 0.1 9 4000 +4 13 0400 DW RN   1/3 0500 0.10 13 4000 +4 17 1200 D/w RN   1/3 1200 0.19 17 4000 +1 18 2000 D/w RN   1/3 2035 0.23 18 -- +2 20 0400 D/w RN   1/4 0545 0.23 18 -- +2 22 1200 D/w RN     1/4   0815   HOLD   22   HOLD   HOLD   HOLD   HOLD D/w ROB Kinsey & APRN; hold heparin drip for  now 2/2 H&H dropping; no overt bleeding noted;   MAR placeholder entered.   1/6 1300 pending -- -- +9 9 2000 D/w Dr. De Jesus and ROB Kinsey. Restart heparin gtt. Will not give initial bolus given concern for dropping hgb. Ok for future boluses.     1/6 1956 0.10 9 4000 +4 13 0600 DW RN. Continue watching hgb, currently stable   1/7 0600 0.10 13 4000 +4 17 1200 D/w RN   1/7 1204 0.10 17 4000 +4 21 2000 D/w ROB Rio    1/7 2023 0.17 21 2000 +3 24 0600 D/w ROB Burns   1/8 0650 0.23 24 -- +1 26 1200 D/w RN   1/8 1250 0.31 26 -- -- 26 2000 D/w RN   1/8 2030 0.27 26 -- +2 28 0300 D/w ROB Burns   1/9 0300 0.59 28 -- -- 28 1000 D/w RN   1/9 0902 0.87 28 -- -2 26 1500  ROB Kinsey. H/H stable    1/9 1547 0.71 26 -- -1 25 2300 MYRA Kinsey   1/10 0003 0.61 25 -- -- 25 0600 [place] [id number] -b   1/10 0515 0.62 25 -- -- 25 AM labs [place] [id number] -b   1/11 0526 0.40 25 -- -- 25 AM labs [address]   1/12 0525 0.31 25 -- -- 25 AM labs [place] [id number] -b   1/13 0546 0.43 25 -- -- 25 AM Labs  RN   1/14 0451 0.54 25 -- -- 25 AM labs [facility] 2467 -b   1/14 1001 -- 25 -- -- Held for line placement AM labs MYRA RN   1/14 1606 -- held -- +25 25 AM labs MYRA RIVAS.   1/15 0430 0.54 25 -- -- 25 AM labs Rajani 6392 -b   1/16 0558 0.10 OFF -- +25 25 1200 IV access was lost and heparin was off.  Central line was placed and heparin resumed at ~05:45.  Re-check anti-Xa in 6 hours.  Rajani 6392 -b   1/16 1238 0.41 25 -- -- 25 1800 DW RN, pump verified                                    Thank you,  Alecia Ingram, PharmD   01/16/25  13:38 EST     DISPLAY PLAN FREE TEXT

## 2025-01-16 NOTE — PROGRESS NOTES
"   LOS: 14 days    Patient Care Team:  Bladimir Calle PA-C as PCP - General (Physician Assistant)    Subjective     No new events    Objective     Vital Signs:  Blood pressure 114/65, pulse 59, temperature 97.6 °F (36.4 °C), temperature source Bladder, resp. rate 27, height 162.6 cm (64.02\"), weight (!) 165 kg (363 lb 4.8 oz), SpO2 99%, not currently breastfeeding.      Intake/Output Summary (Last 24 hours) at 1/16/2025 1105  Last data filed at 1/16/2025 0600  Gross per 24 hour   Intake 5838.97 ml   Output 3401 ml   Net 2437.97 ml        01/15 0701 - 01/16 0700  In: 6594.2 [I.V.:3575.2]  Out: 3851 [Urine:2650]    Physical Exam:        GENERAL: WD morbidly obese WF NAD  NEURO: Sedate on vent   .PSYCHIATRIC: Unable to evaluate  EYE: PE, no icterus, no conjunctivitis  ENT: + ET tube  NECK: No JVD discernable,  Trachea midline  CV: Trace edema, RRR  LUNGS:  Quiet,  Nonlabored resp.  Symmetrical expansion on vent  ABDOMEN: Nondistended, soft nontender.  : + Urena, no palp bladder  SKIN: Warm and dry without rash  + Temp HD cath.    Labs:  Results from last 7 days   Lab Units 01/16/25  0558 01/15/25  0430 01/14/25  0451   WBC 10*3/mm3 5.17 8.30 8.66   HEMOGLOBIN g/dL 7.9* 8.1* 7.9*   PLATELETS 10*3/mm3 141 154 160     Results from last 7 days   Lab Units 01/16/25  0558 01/15/25  1535 01/15/25  0430 01/14/25  1426 01/14/25  0451 01/13/25  0546 01/11/25  1239 01/11/25  0837 01/10/25  2319 01/10/25  1617   SODIUM mmol/L 136  --  140  --  142 137   < > 137 132* 135*   POTASSIUM mmol/L 3.4* 3.6 3.0*  3.0* 2.8* 2.8* 3.8   < > 4.6 4.3 4.4   CHLORIDE mmol/L 95*  --  97*  --  98 94*   < > 99 95* 99   CO2 mmol/L 24.0  --  28.0  --  33.0* 22.0   < > 13.0* 14.0* 17.0*   BUN mg/dL 31*  --  30*  --  32* 31*   < > 24* 19 15   CREATININE mg/dL 0.82  --  0.87  --  1.14* 1.48*   < > 1.48* 1.12* 0.84   CALCIUM mg/dL 9.5  --  9.7  --  10.0 9.2   < > 9.2 8.9 8.9   PHOSPHORUS mg/dL 3.9  --  3.3  --  2.9  --   --  6.4* 6.1* 4.9* "   MAGNESIUM mg/dL 1.3*  --  2.2 1.2* 1.8 1.3*   < > 2.2 2.3 2.4   ALBUMIN g/dL  --   --   --   --  3.5  --   --  3.2* 3.1* 3.4*    < > = values in this interval not displayed.     Results from last 7 days   Lab Units 01/14/25  0451   ALK PHOS U/L 207*   BILIRUBIN mg/dL 0.3   ALT (SGPT) U/L 9   AST (SGOT) U/L 22     Results from last 7 days   Lab Units 01/16/25  0543   PH, ARTERIAL pH units 7.336*   PO2 ART mm Hg 92.9   PCO2, ARTERIAL mm Hg 45.9*   HCO3 ART mmol/L 24.5               Estimated Creatinine Clearance: 145.1 mL/min (by C-G formula based on SCr of 0.82 mg/dL).         A/P:      1-MARIAA:-.  Resolved.  Urine output nonoliguric.  Pre-renal azotemia vs sepsis related MARIAA. UA - RBC 3-5, + protein. 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 At home on lisinopril, metformin and topiramate. Initially was improving with IV hydration and hemodynamic support but later developed worsening resp status and volume overload.   Required  CRRT ~ 1/8/25-1/11/25.  Patient was able to wean off dialysis.  For now continue supportive care.  No further need for HD at this time.      2- Shock-resolved.  Blood pressure stable off pressor. Hemodynamics improved after aggressive UF with CRRT    3-hypokalemia: Patient initially hyperkalemic.  Improved with dialysis.  Potassium now low with increased urine output on loop diuretics.  Can replace aggressively as needed.  Would give magnesium replacement to >2.0 to assist with renal potassium recovery.    4- Severe met acidosis: Resolved.    5- Anemia-hemoglobin remains below goal.  Not due to CKD.  Transfuse as indicated.    6-volume: Negative overnight with nonoliguric urine output.  Monitor for now.    7-Respiratory distress - suspicion of aspiration pneumonia. Intubated on MV.    Nephrology will sign off for now.  Call if any questions or if creatinine worsening.  Can DC HD catheter anytime.    Bakari Osullivan MD  01/16/25  11:05 EST

## 2025-01-16 NOTE — PAYOR COMM NOTE
"ArzateMago (39 y.o. Female)       Date of Birth   1985    Social Security Number       Address   [address] [place], [place] [address]    Home Phone   [phone]    MRN   [id number]       [facility]    Marital Status                               Admission Date   1/2/25    Admission Type   Urgent    Admitting Provider   Saji De Jesus MD    Attending Provider   Saji De Jesus MD    Department, Room/Bed   [facility] 2A ICU, N219/1       Discharge Date       Discharge Disposition       Discharge Destination                                 Attending Provider: Saji De Jesus MD    Allergies: Salvia Officinalis, Shellfish Allergy, Toradol [Ketorolac Tromethamine], Tree Nut, Haldol [Haloperidol], Tramadol, Amoxicillin, Penicillins    Isolation: None   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 [id number]         Subscriber Name Subscriber Birth Date Member ID       MAGO ARZATE 1985 [id number]                     Emergency Contacts        (Rel.) Home Phone Work Phone Mobile Phone    Faina Arzate (Mother) [phone] -- --    Milan Arzate (Son) [phone] -- [phone]              [place]: NPI [id number] Tax ID [id number]  Insurance Information                  [place] Phone: --    Subscriber: Mago Arzate Subscriber#: [id number]    Group#: -- Precert#: --    Authorization#: [id number] " Effective Date: --          Current Facility-Administered Medications   Medication Dose Route Frequency Provider Last Rate Last Admin    acetaminophen (TYLENOL) 160 MG/5ML oral solution 650 mg  650 mg Nasogastric Q6H PRN David Dumont APRN   650 mg at 01/15/25 1757    sennosides-docusate (PERICOLACE) 8.6-50 MG per tablet 2 tablet  2 tablet Nasogastric BID Sergei Davis APRN   2 tablet at 01/16/25 0816    And    polyethylene glycol (MIRALAX) packet 17 g  17 g Nasogastric Daily Sergei Davis APRN   17 g at 01/16/25 0816    And    bisacodyl (DULCOLAX) suppository 10 mg  10 mg Rectal Daily PRN Sergei Davis APRN        bumetanide (BUMEX) injection 1 mg  1 mg Intravenous Daily Gela Romano MD   1 mg at 01/16/25 0816    castor oil-balsam peru (VENELEX) ointment 1 Application  1 Application Topical Q12H Saji De Jesus MD   1 Application at 01/16/25 0817    cefepime 2000 mg IVPB in 100 mL NS (MBP)  2,000 mg Intravenous Q8H Rj Boyd MD   2,000 mg at 01/16/25 0615    chlorhexidine (PERIDEX) 0.12 % solution 15 mL  15 mL Mouth/Throat Q12H Beronica Crump APRN   15 mL at 01/16/25 0815    dexmedetomidine HCl (PRECEDEX) 1,000 mcg in sodium chloride 0.9 % 250 mL infusion  0.2-1.5 mcg/kg/hr Intravenous Titrated Saji De Jesus MD 61.9 mL/hr at 01/16/25 0632 1.5 mcg/kg/hr at 01/16/25 0632    dextrose (D50W) (25 g/50 mL) IV injection 10-50 mL  10-50 mL Intravenous Q15 Min PRN Bettie Walter APRN        famotidine (PEPCID) injection 20 mg  20 mg Intravenous BID Beronica Crump APRN   20 mg at 01/16/25 0816    fentaNYL (SUBLIMAZE) bolus from bag 10 mcg/mL injection 50 mcg  50 mcg Intravenous Q30 Min PRN Vince Toscano MD   50 mcg at 01/16/25 0214    fentaNYL 2500 mcg/250 mL NS infusion   mcg/hr Intravenous Titrated Vince Toscano MD 30 mL/hr at 01/16/25 0533 300 mcg/hr at 01/16/25 0533    glucagon (GLUCAGEN) injection 1 mg  1 mg Intramuscular Q15 Min PRN  Bettie Walter APRN        heparin 31574 units/250 mL (100 units/mL) in 0.45 % NaCl infusion  25 Units/kg/hr Intravenous Titrated Neo Pickens PharmD 39.7 mL/hr at 01/16/25 0532 25 Units/kg/hr at 01/16/25 0532    insulin regular 1 unit/mL in 0.9% sodium chloride (Glucommander)  0-100 Units/hr Intravenous Titrated Bettie Walter APRN 38.9 mL/hr at 01/16/25 0722 38.9 Units/hr at 01/16/25 0722    ipratropium (ATROVENT) nebulizer solution 0.5 mg  0.5 mg Nebulization Q4H PRN Bettie Walter APRN        ipratropium-albuterol (DUO-NEB) nebulizer solution 3 mL  3 mL Nebulization Q4H PRN Bettie Walter APRN        Linezolid (ZYVOX) 600 mg 300 mL  600 mg Intravenous BID Vince Toscano  mL/hr at 01/15/25 2046 600 mg at 01/15/25 2046    Magnesium Standard Dose Replacement - Follow Nurse / BPA Driven Protocol   Not Applicable PRN Bettie Walter APRN        magnesium sulfate 2g/50 mL (PREMIX) infusion  2 g Intravenous Q2H Ted Hinojosa PA-C   2 g at 01/16/25 0816    methylnaltrexone (RELISTOR) injection 12 mg  12 mg Subcutaneous Every Other Day Saji De Jesus MD   12 mg at 01/15/25 0814    midazolam (VERSED) injection 2 mg  2 mg Intravenous Q1H PRN Vince Toscano MD   2 mg at 01/15/25 0934    nystatin (MYCOSTATIN) powder   Topical Q12H Bettie Walter APRN   Given at 01/16/25 0815    Pharmacy to Dose Heparin   Not Applicable Continuous PRN Bettie Walter APRN        Polyvinyl Alcohol-Povidone PF (ARTIFICIAL TEARS) 1.4-0.6 % ophthalmic solution 2 drop  2 drop Both Eyes Q2H PRN Saji De Jesus MD        potassium chloride 20 mEq in 50 mL IVPB  20 mEq Intravenous Q1H Ted Hinojosa PA-C 50 mL/hr at 01/16/25 0817 20 mEq at 01/16/25 0817    Potassium Replacement - Follow Nurse / BPA Driven Protocol   Not Applicable PRN Bettie Walter APRN        sodium chloride 0.9 % flush 10 mL  10 mL Intravenous PRN Saji De Jesus MD        sodium chloride 0.9 %  flush 20 mL  20 mL Intravenous Saji Smith MD         Lab Results (last 24 hours)       Procedure Component Value Units Date/Time    POC Glucose Once [[id number]]  (Abnormal) Collected: 01/16/25 0722    Specimen: Blood Updated: 01/16/25 0724     Glucose 212 mg/dL     Blood Culture ID, PCR - Blood, Blood, Arterial Line [[id number]]  (Abnormal) Collected: 01/14/25 1434    Specimen: Blood, Arterial Line Updated: 01/16/25 0657     BCID, PCR Staph spp, not aureus or lugdunensis. Identification by BCID2 PCR.     BOTTLE TYPE Anaerobic Bottle    Heparin Anti-Xa [[id number]]  (Abnormal) Collected: 01/16/25 0558    Specimen: Blood Updated: 01/16/25 0648     Heparin Anti-Xa (UFH) 0.10 IU/ml     Magnesium [[id number]]  (Abnormal) Collected: 01/16/25 0558    Specimen: Blood Updated: 01/16/25 0643     Magnesium 1.3 mg/dL     Basic Metabolic Panel [[id number]]  (Abnormal) Collected: 01/16/25 0558    Specimen: Blood Updated: 01/16/25 0643     Glucose 241 mg/dL      BUN 31 mg/dL      Creatinine 0.82 mg/dL      Sodium 136 mmol/L      Potassium 3.4 mmol/L      Chloride 95 mmol/L      CO2 24.0 mmol/L      Calcium 9.5 mg/dL      BUN/Creatinine Ratio 37.8     Anion Gap 17.0 mmol/L      eGFR 94.0 mL/min/1.73     Narrative:      GFR Categories in Chronic Kidney Disease (CKD)      GFR Category          GFR (mL/min/1.73)    Interpretation  G1                     90 or greater         Normal or high (1)  G2                      60-89                Mild decrease (1)  G3a                   45-59                Mild to moderate decrease  G3b                   30-44                Moderate to severe decrease  G4                    15-29                Severe decrease  G5                    14 or less           Kidney failure          (1)In the absence of evidence of kidney disease, neither GFR category G1 or G2 fulfill the criteria for CKD.    eGFR calculation 2021 CKD-EPI creatinine equation, which does not include race as a factor     Phosphorus [[id number]]  (Normal) Collected: 01/16/25 0558    Specimen: Blood Updated: 01/16/25 0643     Phosphorus 3.9 mg/dL     CBC & Differential [[id number]]  (Abnormal) Collected: 01/16/25 0558    Specimen: Blood Updated: 01/16/25 0622    Narrative:      The following orders were created for panel order CBC & Differential.  Procedure                               Abnormality         Status                     ---------                               -----------         ------                     CBC Auto Differential[[id number]]        Abnormal            Final result                 Please view results for these tests on the individual orders.    CBC Auto Differential [[id number]]  (Abnormal) Collected: 01/16/25 0558    Specimen: Blood Updated: 01/16/25 0622     WBC 5.17 10*3/mm3      RBC 2.72 10*6/mm3      Hemoglobin 7.9 g/dL      Hematocrit 25.9 %      MCV 95.2 fL      MCH 29.0 pg      MCHC 30.5 g/dL      RDW 17.2 %      RDW-SD 58.1 fl      MPV 10.3 fL      Platelets 141 10*3/mm3      Neutrophil % 78.1 %      Lymphocyte % 11.0 %      Monocyte % 7.0 %      Eosinophil % 2.7 %      Basophil % 0.4 %      Immature Grans % 0.8 %      Neutrophils, Absolute 4.04 10*3/mm3      Lymphocytes, Absolute 0.57 10*3/mm3      Monocytes, Absolute 0.36 10*3/mm3      Eosinophils, Absolute 0.14 10*3/mm3      Basophils, Absolute 0.02 10*3/mm3      Immature Grans, Absolute 0.04 10*3/mm3      nRBC 0.0 /100 WBC     POC Glucose Once [[id number]]  (Abnormal) Collected: 01/16/25 0610    Specimen: Blood Updated: 01/16/25 0613     Glucose 248 mg/dL     Blood Gas, Arterial With Co-Ox [[id number]]  (Abnormal) Collected: 01/16/25 0543    Specimen: Arterial Blood Updated: 01/16/25 0543     Site Right Radial     Apolinar's Test N/A     pH, Arterial 7.336 pH units      Comment: 84 Value below reference range        pCO2, Arterial 45.9 mm Hg      Comment: 83 Value above reference range        pO2, Arterial 92.9 mm Hg      HCO3, Arterial 24.5 mmol/L       Base Excess, Arterial -1.4 mmol/L      Hemoglobin, Blood Gas 8.3 g/dL      Comment: 84 Value below reference range        Hematocrit, Blood Gas 25.5 %      Oxyhemoglobin 95.1 %      Methemoglobin 0.60 %      Carboxyhemoglobin 1.5 %      CO2 Content 25.9 mmol/L      Temperature 37.0     Barometric Pressure for Blood Gas --     Comment: N/A        Modality Ventilator     FIO2 65 %      Ventilator Mode VC+/AC     Set Tidal Volume 0.38     Rate 24 Breaths/minute      PEEP 8.0     PIP 0 cmH2O      Comment: Meter: [id number]     :  [id number]        IPAP 0     EPAP 0     pH, Temp Corrected 7.336 pH Units      pCO2, Temperature Corrected 45.9 mm Hg      pO2, Temperature Corrected 92.9 mm Hg     Blood Culture - Blood, Blood, Arterial Line [[id number]]  (Abnormal) Collected: 01/14/25 1434    Specimen: Blood, Arterial Line Updated: 01/16/25 0532     Blood Culture Abnormal Stain     Gram Stain Anaerobic Bottle Gram positive cocci in pairs and clusters    POC Glucose Once [[id number]]  (Abnormal) Collected: 01/16/25 0451    Specimen: Blood Updated: 01/16/25 0452     Glucose 250 mg/dL     POC Glucose Once [[id number]]  (Abnormal) Collected: 01/16/25 0006    Specimen: Blood Updated: 01/16/25 0008     Glucose 152 mg/dL     POC Glucose Once [[id number]]  (Abnormal) Collected: 01/15/25 2302    Specimen: Blood Updated: 01/15/25 2304     Glucose 147 mg/dL     POC Glucose Once [[id number]]  (Abnormal) Collected: 01/15/25 2155    Specimen: Blood Updated: 01/15/25 2156     Glucose 175 mg/dL     POC Glucose Once [[id number]]  (Abnormal) Collected: 01/15/25 2103    Specimen: Blood Updated: 01/15/25 2105     Glucose 152 mg/dL     POC Glucose Once [[id number]]  (Abnormal) Collected: 01/15/25 2004    Specimen: Blood Updated: 01/15/25 2006     Glucose 139 mg/dL     POC Glucose Once [[id number]]  (Abnormal) Collected: 01/15/25 1902    Specimen: Blood Updated: 01/15/25 1903     Glucose 167 mg/dL     POC Glucose Once [[id number]]  (Abnormal)  Collected: 01/15/25 1754    Specimen: Blood Updated: 01/15/25 1755     Glucose 198 mg/dL     POC Glucose Once [[id number]]  (Abnormal) Collected: 01/15/25 1655    Specimen: Blood Updated: 01/15/25 1657     Glucose 183 mg/dL     Potassium [[id number]]  (Normal) Collected: 01/15/25 1535    Specimen: Blood Updated: 01/15/25 1604     Potassium 3.6 mmol/L      Comment: Slight hemolysis detected by analyzer. Result may be falsely elevated.       POC Glucose Once [[id number]]  (Abnormal) Collected: 01/15/25 1526    Specimen: Blood Updated: 01/15/25 1527     Glucose 154 mg/dL     Blood Culture - Blood, Hand, Right [[id number]]  (Normal) Collected: 01/14/25 1430    Specimen: Blood from Hand, Right Updated: 01/15/25 1500     Blood Culture No growth at 24 hours    Narrative:      Less than seven (7) mL's of blood was collected.  Insufficient quantity may yield false negative results.    POC Glucose Once [[id number]]  (Abnormal) Collected: 01/15/25 1428    Specimen: Blood Updated: 01/15/25 1430     Glucose 133 mg/dL     POC Glucose Once [[id number]]  (Abnormal) Collected: 01/15/25 1330    Specimen: Blood Updated: 01/15/25 1331     Glucose 133 mg/dL     POC Glucose Once [[id number]]  (Abnormal) Collected: 01/15/25 1237    Specimen: Blood Updated: 01/15/25 1238     Glucose 151 mg/dL     Respiratory Culture - Wash, Lung, Left Lower Lobe [[id number]] Collected: 01/14/25 1608    Specimen: Wash from Lung, Left Lower Lobe Updated: 01/15/25 1217     Respiratory Culture Light growth (2+) The culture consists of normal respiratory hafsa. This is a preliminary report; final report to follow.     Gram Stain Many (4+) WBCs per low power field      Rare (1+) Epithelial cells per low power field      Rare (1+) Gram positive cocci in pairs and clusters    Respiratory Culture - Sputum, ET Suction [[id number]] Collected: 01/14/25 1857    Specimen: Sputum from ET Suction Updated: 01/15/25 1207     Respiratory Culture Light growth (2+) The culture consists  of normal respiratory hafsa. This is a preliminary report; final report to follow.     Gram Stain Many (4+) WBCs per low power field      Rare (1+) Epithelial cells per low power field      Few (2+) Gram positive cocci in pairs, chains and clusters    POC Glucose Once [[id number]]  (Abnormal) Collected: 01/15/25 1133    Specimen: Blood Updated: 01/15/25 1136     Glucose 166 mg/dL     POC Glucose Once [[id number]]  (Abnormal) Collected: 01/15/25 1033    Specimen: Blood Updated: 01/15/25 1035     Glucose 198 mg/dL     POC Glucose Once [[id number]]  (Abnormal) Collected: 01/15/25 1025    Specimen: Blood Updated: 01/15/25 1027     Glucose 198 mg/dL     POC Glucose Once [[id number]]  (Abnormal) Collected: 01/15/25 0909    Specimen: Blood Updated: 01/15/25 0911     Glucose 182 mg/dL           Imaging Results (Last 24 Hours)       Procedure Component Value Units Date/Time    XR Chest 1 View [[id number]] Collected: 01/16/25 0522     Updated: 01/16/25 0527    Narrative:      XR CHEST 1 VW    Date of Exam: 1/16/2025 2:57 AM EST    Indication: resp failure    Comparison: 1/15/2025.    Findings:  Endotracheal tube tip is 4.2 cm above the anna. Gastric suction tube and feeding tube both course below the diaphragm. Right internal jugular central venous catheter terminates in the lower SVC. New left internal jugular central venous catheter   terminates in the lower SVC. Left PICC is removed. Stable left basilar consolidation and pleural effusion. Right lung remains clear. No pneumothorax. Heart size is unchanged. Pulmonary vasculature is partially indistinct.      Impression:      Impression:  1.New left internal jugular central venous catheter terminates in the lower SVC.  2.Stable left basilar consolidation and pleural effusion.  3.Stable endotracheal tube, gastric suction tube and feeding tube.          Electronically Signed: Tod Pickens MD    1/16/2025 5:24 AM EST    Workstation ID: [id number]    XR Chest 1 View [[id number]]  Collected: 01/15/25 0819     Updated: 01/15/25 0825    Narrative:      XR CHEST 1 VW    Date of Exam: 1/15/2025 12:30 AM EST    Indication: resp failure    Comparison: Chest x-ray 1/14/2025    Findings:  The patient is rotated. Interval placement of a left upper extremity PICC line terminating in the lower SVC. Otherwise stable medical support devices. Stable cardiomegaly. Redemonstration of dense retrocardiac and left base opacities. No pneumothorax. A   left-sided pleural effusion is difficult to exclude.      Impression:      Impression:  Interval placement of a left upper extremity PICC line.    Redemonstration of dense retrocardiac and left base opacities suggestive of left lower lobe atelectasis. Left-sided pleural effusion is difficult to exclude.      Electronically Signed: Jimmie Santana MD    1/15/2025 8:22 AM EST    Workstation ID: [id number]          Orders (last 24 hrs)        Start     Ordered    01/17/25 0600  Magnesium  Morning Draw         01/16/25 0659    01/16/25 1500  Potassium  Timed         01/16/25 0659    01/16/25 1200  Heparin Anti-Xa  Timed         01/16/25 0700    01/16/25 0900  bumetanide (BUMEX) injection 1 mg  Daily         01/15/25 1125    01/16/25 0745  magnesium sulfate 2g/50 mL (PREMIX) infusion  Every 2 Hours         01/16/25 0659    01/16/25 0745  potassium chloride 20 mEq in 50 mL IVPB  Every 1 Hour         01/16/25 0659    01/16/25 0725  POC Glucose Once  PROCEDURE ONCE        Comments: Complete no more than 45 minutes prior to patient eating      01/16/25 0722    01/16/25 0614  POC Glucose Once  PROCEDURE ONCE        Comments: Complete no more than 45 minutes prior to patient eating      01/16/25 0610    01/16/25 0600  Heparin Anti-Xa  Morning Draw         01/15/25 0613    01/16/25 0600  CBC & Differential  Morning Draw,   Status:  Canceled         01/15/25 1755    01/16/25 0600  Basic Metabolic Panel  Morning Draw         01/15/25 1755    01/16/25 0600  Magnesium  Morning Draw          01/15/25 1755    01/16/25 0600  Blood Gas, Arterial -With Co-Ox Panel: Yes  Morning Draw         01/15/25 1755    01/16/25 0600  Phosphorus  Morning Draw         01/15/25 1755    01/16/25 0600  XR Chest 1 View  1 Time Imaging         01/15/25 1755 01/16/25 0600  CBC Auto Differential  PROCEDURE ONCE        Comments: Discontinue After Heparin Stopped      01/15/25 2201    01/16/25 0544  Blood Gas, Arterial With Co-Ox  PROCEDURE ONCE         01/16/25 0543    01/16/25 0536  Ok to use central line for lab draw  Nursing Communication  Once        Comments: Ok to use central line for lab draw    01/16/25 0536    01/16/25 0533  Blood Culture ID, PCR - Blood, Blood, Arterial Line  Once         01/16/25 0532    01/16/25 0530  midazolam (VERSED) injection 6 mg  Once         01/16/25 0444    01/16/25 0515  ketamine (KETALAR) injection 300 mg  Once         01/16/25 0417    01/16/25 0500  midazolam (VERSED) injection 6 mg  Once         01/16/25 0403    01/16/25 0500  fentaNYL citrate (PF) (SUBLIMAZE) injection 100 mcg  Once         01/16/25 0412    01/16/25 0453  POC Glucose Once  PROCEDURE ONCE        Comments: Complete no more than 45 minutes prior to patient eating      01/16/25 0451    01/16/25 0430  fentaNYL citrate (PF) (SUBLIMAZE) injection 100 mcg  Once         01/16/25 0337    01/16/25 0430  midazolam (VERSED) injection 8 mg  Once         01/16/25 0338    01/16/25 0403  fentaNYL citrate (PF) (SUBLIMAZE) injection 100 mcg  Every 1 Hour PRN,   Status:  Discontinued         01/16/25 0403    01/16/25 0400  midazolam (VERSED) injection 8 mg  Once,   Status:  Discontinued         01/16/25 0331    01/16/25 0040  Restraints Non-Violent or Non-Self Destructive  Calendar Day         01/16/25 0039    01/16/25 0008  POC Glucose Once  PROCEDURE ONCE        Comments: Complete no more than 45 minutes prior to patient eating      01/16/25 0006    01/15/25 2304  POC Glucose Once  PROCEDURE ONCE        Comments: Complete no  more than 45 minutes prior to patient eating      01/15/25 2302    01/15/25 2157  POC Glucose Once  PROCEDURE ONCE        Comments: Complete no more than 45 minutes prior to patient eating      01/15/25 2155    01/15/25 2106  POC Glucose Once  PROCEDURE ONCE        Comments: Complete no more than 45 minutes prior to patient eating      01/15/25 2103    01/15/25 2007  POC Glucose Once  PROCEDURE ONCE        Comments: Complete no more than 45 minutes prior to patient eating      01/15/25 2004    01/15/25 1904  POC Glucose Once  PROCEDURE ONCE        Comments: Complete no more than 45 minutes prior to patient eating      01/15/25 1902    01/15/25 1756  POC Glucose Once  PROCEDURE ONCE        Comments: Complete no more than 45 minutes prior to patient eating      01/15/25 1754    01/15/25 1730  potassium chloride 20 mEq in 50 mL IVPB  Every 1 Hour         01/15/25 1641    01/15/25 1657  POC Glucose Once  PROCEDURE ONCE        Comments: Complete no more than 45 minutes prior to patient eating      01/15/25 1655    01/15/25 1528  POC Glucose Once  PROCEDURE ONCE        Comments: Complete no more than 45 minutes prior to patient eating      01/15/25 1526    01/15/25 1456  Potassium  Timed         01/15/25 0555    01/15/25 1431  POC Glucose Once  PROCEDURE ONCE        Comments: Complete no more than 45 minutes prior to patient eating      01/15/25 1428    01/15/25 1332  POC Glucose Once  PROCEDURE ONCE        Comments: Complete no more than 45 minutes prior to patient eating      01/15/25 1330    01/15/25 1300  castor oil-balsam peru (VENELEX) ointment 1 Application  Every 12 Hours Scheduled         01/15/25 1209    01/15/25 1239  POC Glucose Once  PROCEDURE ONCE        Comments: Complete no more than 45 minutes prior to patient eating      01/15/25 1237    01/15/25 1222  PT Wound Care  Once        Comments: Left flank and coccyx area. Left flank is within skin fold. Coccyx has changed since last assessment. Just wanting to  get mist going to prevent things from going the wrong direction. VASHE MIST?    01/15/25 1222    01/15/25 1137  POC Glucose Once  PROCEDURE ONCE        Comments: Complete no more than 45 minutes prior to patient eating      01/15/25 1133    01/15/25 1035  POC Glucose Once  PROCEDURE ONCE        Comments: Complete no more than 45 minutes prior to patient eating      01/15/25 1033    01/15/25 1028  POC Glucose Once  PROCEDURE ONCE        Comments: Complete no more than 45 minutes prior to patient eating      01/15/25 1025    01/15/25 1023  D / C Arterial Line  Once         01/15/25 1022    01/15/25 0911  POC Glucose Once  PROCEDURE ONCE        Comments: Complete no more than 45 minutes prior to patient eating      01/15/25 0909    01/15/25 0645  potassium chloride 20 mEq in 50 mL IVPB  Every 1 Hour         01/15/25 0555    01/15/25 0600  PICC Site Care  Weekly      Comments: Dressing Changes to PICC Site Every 7 Days and As Needed    01/14/25 1428    01/15/25 0600  Daily CHG Bath While Central Line in Place  Daily       01/14/25 1428    01/15/25 0000  bumetanide (BUMEX) injection 2 mg  2 Times Daily,   Status:  Discontinued         01/14/25 2103    01/14/25 2200  cefepime 2000 mg IVPB in 100 mL NS (MBP)  Every 8 Hours         01/14/25 1347    01/14/25 2100  sodium chloride 0.9 % flush 10 mL  Every 12 Hours Scheduled,   Status:  Discontinued         01/14/25 1428    01/14/25 2100  sodium chloride 0.9 % flush 10 mL  Every 12 Hours Scheduled,   Status:  Discontinued         01/14/25 1428    01/14/25 2100  sodium chloride 0.9 % flush 10 mL  Every 12 Hours Scheduled,   Status:  Discontinued         01/14/25 1428    01/14/25 1639  Magnesium Standard Dose Replacement - Follow Nurse / BPA Driven Protocol  As Needed         01/14/25 1640    01/14/25 1428  sodium chloride 0.9 % flush 10 mL  As Needed         01/14/25 1428    01/14/25 1428  sodium chloride 0.9 % flush 20 mL  As Needed         01/14/25 1428    01/14/25 1428   sodium chloride 0.9 % infusion 40 mL  As Needed,   Status:  Discontinued         01/14/25 1428    01/14/25 1400  midazolam (VERSED) injection 2 mg  Every 1 Hour PRN         01/14/25 1400    01/13/25 1630  insulin regular 1 unit/mL in 0.9% sodium chloride (Glucommander)  Titrated        Note to Pharmacy: Upon initiation of Glucommander insulin drip, make sure all other insulin orders and anti-diabetic medications have been discontinued.    01/13/25 1542    01/13/25 1600  POC Glucose Finger Q1H  Every Hour       01/13/25 1543    01/13/25 1540  Potassium Replacement - Follow Nurse / BPA Driven Protocol  As Needed         01/13/25 1542    01/13/25 1540  dextrose (D50W) (25 g/50 mL) IV injection 10-50 mL  Every 15 Minutes PRN         01/13/25 1542    01/13/25 1540  glucagon (GLUCAGEN) injection 1 mg  Every 15 Minutes PRN         01/13/25 1542    01/13/25 1215  Linezolid (ZYVOX) 600 mg 300 mL  2 Times Daily         01/13/25 1115    01/13/25 0100  dexmedetomidine HCl (PRECEDEX) 1,000 mcg in sodium chloride 0.9 % 250 mL infusion  Titrated         01/13/25 0013    01/11/25 1930  fentaNYL (SUBLIMAZE) bolus from bag 10 mcg/mL injection 50 mcg  Every 30 Minutes PRN         01/11/25 1930    01/10/25 0929  Polyvinyl Alcohol-Povidone PF (ARTIFICIAL TEARS) 1.4-0.6 % ophthalmic solution 2 drop  Every 2 Hours PRN         01/10/25 0929    01/09/25 1015  methylnaltrexone (RELISTOR) injection 12 mg  Every Other Day         01/09/25 0929    01/09/25 0845  fentaNYL 2500 mcg/250 mL NS infusion  Titrated         01/09/25 0749    01/08/25 2215  heparin 62407 units/250 mL (100 units/mL) in 0.45 % NaCl infusion  Titrated         01/08/25 2115    01/08/25 2000  Wound Care  2 Times Daily      Comments: Turn q 2 hours.  Apply allevyn dressings to sacrum and heels.    01/08/25 1142    01/08/25 1600  Strict Intake & Output  Every Hour       01/08/25 1507    01/08/25 1508  Daily Weights  Daily       01/08/25 1507    01/08/25 0900  polyethylene  "glycol (MIRALAX) packet 17 g  Daily        Placed in \"And\" Linked Group    01/08/25 0639    01/08/25 0900  sennosides-docusate (PERICOLACE) 8.6-50 MG per tablet 2 tablet  2 Times Daily        Placed in \"And\" Linked Group    01/08/25 0639    01/08/25 0639  bisacodyl (DULCOLAX) suppository 10 mg  Daily PRN        Placed in \"And\" Linked Group    01/08/25 0639    01/07/25 1809  acetaminophen (TYLENOL) 160 MG/5ML oral solution 650 mg  Every 6 Hours PRN         01/07/25 1809    01/07/25 0600  CBC & Differential  Every Third Day      Comments: Discontinue After Heparin Stopped      01/06/25 1303    01/03/25 2100  nystatin (MYCOSTATIN) powder  Every 12 Hours Scheduled         01/03/25 1817    01/03/25 1330  Daily Weights  Daily       01/03/25 1329    01/03/25 1329  Nutrition Panel  Weekly       01/03/25 1329    01/03/25 1329  C-reactive Protein  Weekly       01/03/25 1329    01/03/25 1326  Tube Feeding Assessment and I/O  Every Shift       01/03/25 1329    01/02/25 1704  Monitor QTc  Every Shift       01/02/25 1704    01/02/25 1512  Pharmacy to Dose Heparin  Continuous PRN         01/02/25 1513    01/02/25 1400  Incentive Spirometry  Every 4 Hours While Awake       01/02/25 1228    01/02/25 1245  chlorhexidine (PERIDEX) 0.12 % solution 15 mL  Every 12 Hours Scheduled         01/02/25 1145    01/02/25 1245  famotidine (PEPCID) injection 20 mg  2 Times Daily         01/02/25 1145    01/02/25 1245  norepinephrine (LEVOPHED) 8 mg in 250 mL NS infusion (premix)  Titrated,   Status:  Discontinued         01/02/25 1146    01/02/25 1228  ipratropium (ATROVENT) nebulizer solution 0.5 mg  Every 4 Hours PRN         01/02/25 1228    01/02/25 1228  ipratropium-albuterol (DUO-NEB) nebulizer solution 3 mL  Every 4 Hours PRN         01/02/25 1228    01/02/25 1200  Vital Signs Every Hour and Per Hospital Policy Based on Patient Condition  Every Hour       01/02/25 1143    01/02/25 1200  Intake & Output  Every Hour       01/02/25 1143    " 01/02/25 1200  Oral Care & Teeth Brushing - Intubated Patient  Every 4 Hours      Comments: [place] [facility] at Least 2x/day    01/02/25 1145    01/02/25 1144  Daily Weights  Daily       01/02/25 1143    01/02/25 1144  Daily CHG Bath While in Critical Care  Daily      Comments: Use for admission bath and length of critical care stay.    01/02/25 1143    01/02/25 1143  Oral Care - Patient Not on NPPV & Not Intubated  Every Shift       01/02/25 1143    01/02/25 1142  sodium chloride 0.9 % flush 10 mL  As Needed,   Status:  Discontinued         01/02/25 1143    Unscheduled  Spontaneous Awakening Trial  Daily - SAT       01/02/25 1145    Unscheduled  Spontaneous Breathing Trial  Daily - SBT       01/02/25 1145    Unscheduled  Jayesh & Document Feeding Tube Depth (in cm)  As Needed       01/03/25 1329    Unscheduled  Verify Feeding Tube Placement Upon Insertion & As Needed  As Needed       01/03/25 1329    Unscheduled  Flush Feeding Tube With 30-50mL Water As Needed  As Needed       01/03/25 1329    Unscheduled  Follow Hypoglycemia Standing Orders For Blood Glucose <70 & Notify Provider of Treatment  As Needed      Comments: Follow Hypoglycemia Orders As Outlined in Process Instructions (Open Order Report to View Full Instructions)  Notify Provider Any Time Hypoglycemia Treatment is Administered    01/07/25 1349    Unscheduled  Treat Hypoglycemia As Recommended By [employer]™ & Notify Provider of Treatment  As Needed      Comments: Follow Hypoglycemia Orders As Outlined in Process Instructions (Open Order Report to View Full Instructions)  Notify Provider Any Time Hypoglycemia Treatment is Administered    01/13/25 1542    Unscheduled  If Insulin Infusion is Paused - Follow [employer] Instructions  As Needed       01/13/25 1542    --  vitamin B-12 (CYANOCOBALAMIN) 1000 MCG tablet  Daily         01/03/25 1406    --  DULoxetine (CYMBALTA) 30 MG capsule  Daily         01/03/25 1407    --  DULoxetine (CYMBALTA) 30 MG capsule   Nightly         01/03/25 1407    --  metoprolol succinate XL (TOPROL-XL) 50 MG 24 hr tablet  2 Times Daily         01/03/25 1408    --  multivitamin with minerals (MULTIVITAMIN WOMEN PO)  Daily         01/03/25 1410    --  Enoxaparin Sodium (LOVENOX) 100 MG/ML solution prefilled syringe syringe  Every 12 Hours Scheduled         01/03/25 1410    Signed and Held  albumin human 25 % IV SOLN 12.5 g  As Needed         Signed and Held                     Physician Progress Notes (last 24 hours)        Vince Toscano MD at 01/15/25 1530            Intensive Care Follow-up     Hospital:  LOS: 13 days   Ms. Natalia Arzate, 39 y.o. female is followed for:   Acute respiratory failure with hypercapnia     Subjective       History of present illness:    40yo F with a history of HLD, Hypothyroidism, Afib, PE/DVT, Factor V Leiden mutation, and stroke who presented to [facility] with altered mental status. Labs there were significant for renal failure, hyperglycemia, and hypercapnic respiratory failure. Imaging consistent with basilar pulmonary infiltrates concerning for pneumonia. She was intubated and required the initiation of vasopressors. She was transferred to [facility] on 1/2/25 for ongoing care.      Since arrival, she has continued to require mechanical ventilation along with vasopressors. She has been started on Vancomycin, Flagyl and Cefepime. Cultures are significant for MRSA in the sputum along with gemella sanguinis in the blood.      Nephrology has been following for MARIAA with poor urine output and hyperkalemia.      On the morning of 1/4/25, she developed worsening hypoxia despite an FiO2 of 100% and PEEP of 16. Imaging showed white out of the left lung. Bronchoscopy was performed with mucopurulent secretions suctioned from the left upper and left lower lobes. Repeat CXR after bronchoscopy showed some improvement in left upper lobe aeration with persistent left lower lobe disease vs pleural effusion    Patient has  worsening renal failure and started on CRRT which eventually was transitioned to intermittent hemodialysis    Due to persistent fevers bronchoscopy was done on 1/14 with large amount of secretions suctioned out from left lung field.  Central line was also discontinued and PICC line placed.  Arterial line discontinued 1/15.    Subjective   Interval History:  Overnight patient continued to have fevers.  Underwent bronchoscopy yesterday with large amount of secretions suctioned out.  Seen by infectious disease.  Remains sedated on ventilator.  Urine output is improving.             The patient's past medical, surgical and social history were reviewed and updated in Epic as appropriate.    Objective   Objective     Infusions:  dexmedetomidine HCl (PRECEDEX) 1,000 mcg in sodium chloride 0.9 % 250 mL infusion, 0.2-1.5 mcg/kg/hr, Last Rate: 1.5 mcg/kg/hr (01/15/25 0905)  fentanyl 10 mcg/mL,  mcg/hr, Last Rate: 300 mcg/hr (01/15/25 0927)  heparin, 25 Units/kg/hr, Last Rate: 25 Units/kg/hr (01/15/25 1669)  insulin, 0-100 Units/hr, Last Rate: 9.6 Units/hr (01/15/25 2619)  Pharmacy to Dose Heparin,       Medications:  [START ON 1/16/2025] bumetanide, 1 mg, Intravenous, Daily  castor oil-balsam peru, 1 Application, Topical, Q12H  cefepime, 2,000 mg, Intravenous, Q8H  chlorhexidine, 15 mL, Mouth/Throat, Q12H  famotidine, 20 mg, Intravenous, BID  Linezolid, 600 mg, Intravenous, BID  methylnaltrexone, 12 mg, Subcutaneous, Every Other Day  nystatin, , Topical, Q12H  senna-docusate sodium, 2 tablet, Nasogastric, BID   And  polyethylene glycol, 17 g, Nasogastric, Daily        Vital Sign Min/Max for last 24 hours  Temp  Min: 98.6 °F (37 °C)  Max: 101.1 °F (38.4 °C)   BP  Min: 118/64  Max: 206/95   Pulse  Min: 61  Max: 116   Resp  Min: 23  Max: 24   SpO2  Min: 88 %  Max: 100 %   Flow (L/min) (Oxygen Therapy)  Min: 50  Max: 50       Input/Output for last 24 hour shift  01/14 0701 - 01/15 0700  In: 5565.8 [I.V.:4095.8]  Out: 4671  "[Urine:4300]   Mode: VC+/AC  FiO2 (%):  [35 %-50 %] 50 %  S RR:  [24] 24  S VT:  [380 mL] 380 mL  PEEP/CPAP (cm H2O):  [8 cm H20] 8 cm H20  MAP (cm H2O):  [13-14] 13  Objective:  Vital signs: (most recent): Blood pressure 158/84, pulse 89, temperature 100.5 °F (38.1 °C), temperature source Bladder, resp. rate 24, height 162.6 cm (64.02\"), weight (!) 165 kg (363 lb 4.8 oz), SpO2 91%, not currently breastfeeding.            General Appearance:  Not in distress, no asynchrony with mechanical ventilator.  Head:    Atraumatic, Normocephalic, Pupils reactive & symmetrical B/L.  Neck:  Endotracheal tube clean.   Lungs:   B/L Breath sounds present with decreased breath sounds on bases, no wheezing heard, occ crackles.   Heart: S1 and S2 present, no murmur  Abdomen: Obese, soft, bowel sounds hypoactive  Extremities:   + edema, warm to touch.  Neurologic:  Pt sedated on the vent. Not able to participate in full neurological exam    Ventilator settings: A/C: FiO2 50 PEEP 8      Results from last 7 days   Lab Units 01/15/25  0430 01/14/25  0451 01/13/25  0546   WBC 10*3/mm3 8.30 8.66 5.67   HEMOGLOBIN g/dL 8.1* 7.9* 7.5*   PLATELETS 10*3/mm3 154 160 167     Results from last 7 days   Lab Units 01/15/25  0430 01/14/25  1426 01/14/25  0451 01/13/25  0546 01/11/25  1239 01/11/25  0837   SODIUM mmol/L 140  --  142 137   < > 137   POTASSIUM mmol/L 3.0*  3.0* 2.8* 2.8* 3.8   < > 4.6   CO2 mmol/L 28.0  --  33.0* 22.0   < > 13.0*   BUN mg/dL 30*  --  32* 31*   < > 24*   CREATININE mg/dL 0.87  --  1.14* 1.48*   < > 1.48*   MAGNESIUM mg/dL 2.2 1.2* 1.8 1.3*   < > 2.2   PHOSPHORUS mg/dL 3.3  --  2.9  --   --  6.4*   GLUCOSE mg/dL 173*  --  198* 380*   < > 143*    < > = values in this interval not displayed.     Estimated Creatinine Clearance: 136.7 mL/min (by C-G formula based on SCr of 0.87 mg/dL).    Results from last 7 days   Lab Units 01/14/25  0323   PH, ARTERIAL pH units 7.469*   PCO2, ARTERIAL mm Hg 44.2   PO2 ART mm Hg 71.5* "       Images:   Abdominal x-ray reviewed and Showed tube is possibly within the antrum of stomach.      Chest x-ray reviewed and endotracheal tube above anna.  NG tube of the diaphragm.  Bilateral hazy opacities with left lower lobe atelectasis still persisting.      Patient's results and images.     Assessment & Plan   Impression        Acute respiratory failure with hypercapnia    MARIAA (acute kidney injury)    Type 2 diabetes mellitus with hyperglycemia    Sepsis       Plan        1.  Patient presented here with respiratory failure currently on mechanical ventilation. Patient found to have MRSA pneumonia.  Started on Zyvox.  1 out of 2 blood culture positive for coag negative staph.  Repeat cultures have been negative likely contaminant.  Patient continues to have fevers.  Blood cultures repeated.  Central line discontinued.  Appreciate ID team's help and added cefepime empirically.  Await final cultures.  Patient has arterial line which is 4 days old and we will go ahead and discontinue that today and monitor closely.  2.  Continue heparin drip with history of atrial fibrillation and factor V Leiden with previous thromboembolic disease.  Seems to be tolerating well.  No acute bleed noted.  Hemoglobin low but stable.  3.  Continue current mechanical ventilation.  Ventilator adjustments made after reviewing blood gases.  Bronchoscopy done yesterday revealed large amount of secretions in the left lung which were suctioned out.  Still has significant atelectasis and consolidation on that side.  Will continue pulmonary toilet.  Will consider MetaNeb.  4.  Renal function improving.  Nephrology team is following.  Continuing on diuresis.  Will continue to follow electrolytes and replace aggressively per ICU protocol.  5.  Not having any bowel movements.  Will try enema again.  Continue Relistor.  Continue with bowel regimen.  6.  Enteral nutrition as tolerated.    7.  Continue insulin drip to manage hyperglycemia.  8.   GI prophylaxis.  9.  Light sedation for comfort and ventilator synchrony.    Continue supportive care.  Patient remains at high risk of decline.  Family updated at bedside.  Check labs again in the morning.    Plan of care and goals reviewed with multidisciplinary/antibiotic stewardship team during rounds.   I discussed the patient's findings and my recommendations with family and nursing staff     Time spent Critical care 32 min (exclusive of procedure time)  including high complexity decision making to assess, manipulate, and support vital organ system failure in this individual who has impairment of one or more vital organ systems such that there is a high probability of imminent or life threatening deterioration in the patient’s condition.  Above documentation reviewed and reflect accurate information as of 1/15/2025 including copied elements of the note.       Vince Toscano MD, [facility]  Pulmonary, Critical care and Sleep Medicine         Electronically signed by Vince Toscano MD at 01/15/25 0766       Rj Boyd MD at 01/15/25 1507            INFECTIOUS DISEASES  INPATIENT PROGRESS NOTE  1/15/2025      PATIENT NAME: Natalia Arzate  :  1985  MRN:  [id number]  Date of Admission:  2025      Antimicrobials:  Linezolid - started 25  Cefepime - started 25      MAR reviewed.      Reason for consultation:  fevers    Interval history:  Patient remains intubated/sedated on mechanical ventilation in ICU.  Fever curve improved.  Cefepime added yesterday to linezolid.  BAL performed and culture with normal hafsa thus far.  Blood cultures no growth so far.  CVL removed and PICC placed.    ROS:  Unable to obtain due to current medical condition, intubation, and altered mental status.      Objective:  Temp (24hrs), Av.9 °F (37.7 °C), Min:98.6 °F (37 °C), Max:101.1 °F (38.4 °C)    /70 (BP Location: Right leg, Patient Position: Lying)   Pulse 69   Temp 100.2 °F (37.9 °C) (Axillary)   " Resp 24   Ht 162.6 cm (64.02\")   Wt (!) 165 kg (363 lb 4.8 oz)   LMP  (LMP Unknown) Comment: last menses 6 years ago (HCG negative today)  SpO2 97%   BMI 62.33 kg/m²     Mode: VC+/AC  FiO2 (%):  [35 %-50 %] 50 %  S RR:  [24] 24  S VT:  [380 mL] 380 mL  PEEP/CPAP (cm H2O):  [8 cm H20] 8 cm H20  MAP (cm H2O):  [13-14] 13      Physical Examination:  GENERAL: Acutely on chronically ill-appearing female.  Obese.  Intubated and sedated on mechanical ventilation in the ICU.  LINES: Right IJ temp HD cath.  Left arm PICC.  HEENT: Normocephalic, atraumatic.  Eyes closed.    CV: RRR. No murmur, rubs, gallops.  Normal S1S2.  LUNGS: Clear to auscultation bilaterally without wheezing, rales, rhonchi. Normal respiratory effort on vent.  Decreased BS B.  ABDOMEN: Positive bowel sounds.  Soft, nontender, nondistended.  No rebound or guarding.  Large pannus.  No rash underneath pannus.  EXT:  No clubbing, cyanosis, or edema.  :  +Urena catheter, draining clear yellow urine.  MSK: No joint effusions or inflammation noted.  SKIN: Warm and dry without rash or ulcer.  NEURO: sedated    Laboratory Data:    Results from last 7 days   Lab Units 01/15/25  0430 01/14/25  0451 01/13/25  0546   WBC 10*3/mm3 8.30 8.66 5.67   HEMOGLOBIN g/dL 8.1* 7.9* 7.5*   HEMATOCRIT % 26.1* 24.9* 24.4*   PLATELETS 10*3/mm3 154 160 167     Results from last 7 days   Lab Units 01/15/25  0430   SODIUM mmol/L 140   POTASSIUM mmol/L 3.0*  3.0*   CHLORIDE mmol/L 97*   CO2 mmol/L 28.0   BUN mg/dL 30*   CREATININE mg/dL 0.87   GLUCOSE mg/dL 173*   CALCIUM mg/dL 9.7     Results from last 7 days   Lab Units 01/14/25  0451   ALK PHOS U/L 207*   BILIRUBIN mg/dL 0.3   ALT (SGPT) U/L 9   AST (SGOT) U/L 22         Results from last 7 days   Lab Units 01/10/25  0842   CRP mg/dL 13.22*     Estimated Creatinine Clearance: 136.7 mL/min (by C-G formula based on SCr of 0.87 mg/dL).  Results from last 7 days   Lab Units 01/11/25  1456   LACTATE mmol/L 0.5         Results " from last 7 days   Lab Units 01/12/25  0525 01/11/25  0526 01/10/25  1617   VANCOMYCIN TR mcg/mL  --  22.40*  --    VANCOMYCIN RM mcg/mL 10.20  --  8.70             Microbiology:    Microbiology Results (last 10 days)       Procedure Component Value - Date/Time    Respiratory Culture - Sputum, ET Suction [[id number]] Collected: 01/14/25 1857    Lab Status: Preliminary result Specimen: Sputum from ET Suction Updated: 01/15/25 1207     Respiratory Culture Light growth (2+) The culture consists of normal respiratory hafsa. This is a preliminary report; final report to follow.     Gram Stain Many (4+) WBCs per low power field      Rare (1+) Epithelial cells per low power field      Few (2+) Gram positive cocci in pairs, chains and clusters    Respiratory Culture - Wash, Lung, Left Lower Lobe [[id number]] Collected: 01/14/25 1608    Lab Status: Preliminary result Specimen: Wash from Lung, Left Lower Lobe Updated: 01/15/25 1217     Respiratory Culture Light growth (2+) The culture consists of normal respiratory hafsa. This is a preliminary report; final report to follow.     Gram Stain Many (4+) WBCs per low power field      Rare (1+) Epithelial cells per low power field      Rare (1+) Gram positive cocci in pairs and clusters    Blood Culture - Blood, Hand, Right [[id number]]  (Normal) Collected: 01/14/25 1430    Lab Status: Preliminary result Specimen: Blood from Hand, Right Updated: 01/15/25 1500     Blood Culture No growth at 24 hours    Narrative:      Less than seven (7) mL's of blood was collected.  Insufficient quantity may yield false negative results.    Blood Culture - Blood, Hand, Left [[id number]]  (Normal) Collected: 01/08/25 1624    Lab Status: Final result Specimen: Blood from Hand, Left Updated: 01/13/25 1731     Blood Culture No growth at 5 days    Blood Culture - Blood, Blood, Arterial Line [[id number]]  (Normal) Collected: 01/08/25 1602    Lab Status: Final result Specimen: Blood, Arterial Line Updated:  01/13/25 1731     Blood Culture No growth at 5 days                Radiology:  XR Chest 1 View    Result Date: 1/15/2025  Impression: Interval placement of a left upper extremity PICC line. Redemonstration of dense retrocardiac and left base opacities suggestive of left lower lobe atelectasis. Left-sided pleural effusion is difficult to exclude. Electronically Signed: Jimmie Santana MD  1/15/2025 8:22 AM EST  Workstation ID: [id number]    XR Chest 1 View    Result Date: 1/14/2025  Impression: 1.Cardiomegaly with bibasilar airspace opacities which may be related to atelectasis, edema, or infiltrates. Possible left pleural effusion. 2.Overall, no significant change since 1/13/2025. Electronically Signed: Apollo Solorzano MD  1/14/2025 8:22 AM EST  Workstation ID: [id number]     I have personally reviewed the radiology images.        DISCUSSION:  39 y.o. female with history of factor V Leiden, obesity, and uncontrolled type 2 diabetes admitted with hyperglycemia.   Extended intubation with 14-day hospitalization, now with fever.     PROBLEM LIST:   -- Fever, hospital onset - improving on linezolid/cefepime.  With prolonged ICU stay at 2 weeks with new fever, suspect nosocomial infection.  Consider central line associated bloodstream infection, catheter associated urinary tract infection, ventilator associated pneumonia.  UA not collected.  Blood cultures no growth so far.  Prior CVL removed.  BAL culture normal hafsa so far.  -- Acute hypoxic respiratory failure, intubated since 1/1/2025.    -- Bibasilar pulmonary infiltrates in the setting of fever and extended intubation, risk for ventilator associated pneumonia.  -- Indwelling Urena catheter, risk for catheter associated UTI.  -- Right IJ central line in place from outside ER, risk for CLABSI.  -- MRSA pneumonia, previously treated with vancomycin for 5 days, currently with linezolid.  -- Positive blood cultures.  Blood cultures x 2 from 1/1/2025 with 1 bottle positive  "for Gemella sanguinis and 1 bottle and the other set positive for coagulase-negative Staphylococcus.  CoNS likely contaminant.  Unclear significance for Gemella species - was treated with 10 days aminopenicillin (ampicillin followed by Unasyn).  Repeat blood cultures were no growth.  -- Obesity.  Skin appears intact at the fold without evidence for breakdown or infection.  -- Uncontrolled type 2 diabetes, contributing to propensity for infection and poor healing.  -- Factor V Leiden.  -- Listed penicillin allergy but tolerated ampicillin.    PLAN:  -- Continue empiric linezolid and cefepime for now  -- Follow cultures  -- Monitor vitals, exam, and labs  -- Monitor for adverse reactions to antimicrobials    Complex case.      Rj Boyd MD  1/15/2025  15:03 EST      Electronically signed by Rj Boyd MD at 01/15/25 1512       Gela Romano MD at 01/15/25 0941             LOS: 13 days    Patient Care Team:  Bladimir Calle PA-C as PCP - General (Physician Assistant)    Subjective   Chart reviewed.  Patient remain on ventilator.  MARIAA improving.  Hyponatremia with diuresis noted.  High risk complex patient.  Discussed with the family members in the room.      Objective     Vital Signs:  Blood pressure 123/62, pulse 97, temperature 100.1 °F (37.8 °C), temperature source Oral, resp. rate 24, height 162.6 cm (64.02\"), weight (!) 165 kg (363 lb 4.8 oz), SpO2 95%, not currently breastfeeding.      Intake/Output Summary (Last 24 hours) at 1/15/2025 0933  Last data filed at 1/15/2025 0921  Gross per 24 hour   Intake 6320.98 ml   Output 4900 ml   Net 1420.98 ml        01/14 0701 - 01/15 0700  In: 5565.8 [I.V.:4095.8]  Out: 5200 [Urine:4300]    Physical Exam:  General Appearance: Morbidly obese  female on ventilator.  Oral: ET tube in place.  Eyes: PER, EOMI.  Neck: Thick  Lungs: Equal chest movement, nonlabored.  Heart: No gallop, murmur, rub, RRR.  Abdomen: Soft, nontender, positive bowel sounds, " morbid obesity  Extremities: No edema, no cyanosis.  Neuro: Cannot be evaluated  : Urena catheter in place.      Labs:  Results from last 7 days   Lab Units 01/15/25  0430 01/14/25  0451 01/13/25  0546   WBC 10*3/mm3 8.30 8.66 5.67   HEMOGLOBIN g/dL 8.1* 7.9* 7.5*   PLATELETS 10*3/mm3 154 160 167     Results from last 7 days   Lab Units 01/15/25  0430 01/14/25  1426 01/14/25  0451 01/13/25  0546 01/12/25  0525 01/11/25  1239 01/11/25  0837 01/10/25  2319 01/10/25  1617   SODIUM mmol/L 140  --  142 137 134*   < > 137 132* 135*   POTASSIUM mmol/L 3.0*  3.0* 2.8* 2.8* 3.8 3.8   < > 4.6 4.3 4.4   CHLORIDE mmol/L 97*  --  98 94* 91*   < > 99 95* 99   CO2 mmol/L 28.0  --  33.0* 22.0 17.0*   < > 13.0* 14.0* 17.0*   BUN mg/dL 30*  --  32* 31* 24*   < > 24* 19 15   CREATININE mg/dL 0.87  --  1.14* 1.48* 1.38*   < > 1.48* 1.12* 0.84   CALCIUM mg/dL 9.7  --  10.0 9.2 9.3   < > 9.2 8.9 8.9   PHOSPHORUS mg/dL 3.3  --  2.9  --   --   --  6.4* 6.1* 4.9*   MAGNESIUM mg/dL 2.2 1.2* 1.8 1.3* 1.9  --  2.2 2.3 2.4   ALBUMIN g/dL  --   --  3.5  --   --   --  3.2* 3.1* 3.4*    < > = values in this interval not displayed.     Results from last 7 days   Lab Units 01/14/25 0451   ALK PHOS U/L 207*   BILIRUBIN mg/dL 0.3   ALT (SGPT) U/L 9   AST (SGOT) U/L 22     Results from last 7 days   Lab Units 01/14/25  0323   PH, ARTERIAL pH units 7.469*   PO2 ART mm Hg 71.5*   PCO2, ARTERIAL mm Hg 44.2   HCO3 ART mmol/L 32.1*       A/P:      1.  MARIAA:.  Solitary kidney renal function improved.  Creatinine baseline.  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 At home on lisinopril, metformin and topiramate. Initially was improving with IV hydration and hemodynamic support but later developed worsening resp status and volume overload.   Required  CRRT ~ 1/8/25-1/11/25.  For now continue supportive care.  No further need for HD at this time.  Discussed with her fiancé in the room    2.  Shock- Off pressor. Hemodynamics improved after  aggressive UF with CRRT    3.  Hyperkalemia -resolved.  Management with CRRT was done    4.  Severe met acidosis: Improving. .    6.  Anemia- Transfuse at hB<7    7.  Volume: Monitor input output.  No edema noted    8.  Respiratory distress - suspicion of aspiration pneumonia. Intubated on MV.    High risk complex patient multiple medical problems.  Replace potassium and magnesium  Discussed with the staff RN.  Edema has markedly improved, will decrease the Bumex once a day    Gela Romano MD  01/15/25  09:33 EST          Electronically signed by Gela Romano MD at 01/15/25 1126       Consult Notes (last 24 hours)  Notes from 01/15/25 0818 through 01/16/25 0818   No notes of this type exist for this encounter.

## 2025-01-16 NOTE — PLAN OF CARE
Goal Outcome Evaluation:  Plan of Care Reviewed With: patient, parent, significant other         * pt afebrile but diaphoretic most of the night  0230, pts PICC dislodged with bath/turn. Attempted ultrasound IV, not successful. MD notified and TL deep line placed in left subclavian. Pt received Versed 6mg IM, Ketamine 300mg IM, Fent 6mg IM @ [id number] was not successful at sedating pt, Versed 6mg IVP and Fent 100mg IVP ordered,@ 0409 administered via temp HD access. Pt was made comfortable at that time. At 0440, pt became more alert during procedure, another 6mg Versed IVP ordered. Pt sedated at that time and tolerated well. MD able to successfully place TLDL after 2hrs and per Xray, ok'd use at 0530. MD also ok'd using central line for blood draw r/t pt being a very hard stick. Order placed.  All of Pts IV medications were off from approx 0230 until 0530, therefore, blood sugar and anti Xa were not therapeutic at time of check after IV meds restarted. Pharmacist made no changes to Hep gtt and will recheck today and will make changes at that time if needed. Insulin titrated per glucommander once restarted and monitored to be sure pt did not become hypoglycemic. Pt tolerated titration of insulin. 3 hour gap of documenting with glucommander r/t no insulin running. MD at bedside and aware of all noted above.  Pt remained flat after PICC line dislodged for 30 min and longer, per protocol, to assure no air emboli post removal.  Pts glucose spot checked during 3hr time frame of insulin off to make sure she did not become hypoglycemic.  All tubing replaced post TLDL placement  K+  and mag to be replaced per protocol.  Adequate UOP of 1300ML  TF at max rate, restarted after line placed and pt able to raise HOB.  Pt had 700 out of OG, bile like in color  1 small smear BM   Pt had pos BC anaerobic bottle gram pos cocci pairs and clusters, PRICE Hinojosa notified  Mom updated over phone, boyfriend at bedside  Pt remained in  restraints

## 2025-01-16 NOTE — PLAN OF CARE
Goal Outcome Evaluation:    Problem: Adult Inpatient Plan of Care  Goal: Plan of Care Review  Outcome: Progressing     -Patient remains intubated, Fentanyl OFF, Precedex still infusing @ 1  -SBT today for multiple hours in AM and Afternoon... Tolerated well  -Planning for SBT/possible extubation  in AM once patient has been off cont. sedation through the night  -UOP 1960, 1 small BM   -Able to appropriately nod to questions/follow commands  -Sig. other at bedside updated and Mom via phone

## 2025-01-16 NOTE — PLAN OF CARE
Goal Outcome Evaluation:               UNABLE TO DECREASE VENT SUPPORT

## 2025-01-16 NOTE — PROGRESS NOTES
Intensive Care Follow-up     Hospital:  LOS: 14 days   Ms. Natalia Arzate, 39 y.o. female is followed for:   Acute respiratory failure with hypercapnia            History of present illness:    38yo F with a history of HLD, Hypothyroidism, Afib, PE/DVT, Factor V Leiden mutation, and stroke who presented to [facility] with altered mental status. Labs there were significant for renal failure, hyperglycemia, and hypercapnic respiratory failure. Imaging consistent with basilar pulmonary infiltrates concerning for pneumonia. She was intubated and required the initiation of vasopressors. She was transferred to [facility] on 1/2/25 for ongoing care.      Since arrival, she has continued to require mechanical ventilation along with vasopressors. She has been started on Vancomycin, Flagyl and Cefepime. Cultures are significant for MRSA in the sputum along with gemella sanguinis in the blood.      Nephrology has been following for MARIAA with poor urine output and hyperkalemia.      On the morning of 1/4/25, she developed worsening hypoxia despite an FiO2 of 100% and PEEP of 16. Imaging showed white out of the left lung. Bronchoscopy was performed with mucopurulent secretions suctioned from the left upper and left lower lobes. Repeat CXR after bronchoscopy showed some improvement in left upper lobe aeration with persistent left lower lobe disease vs pleural effusion    Patient has worsening renal failure and started on CRRT which eventually was transitioned to intermittent hemodialysis    Due to persistent fevers bronchoscopy was done on 1/14 with large amount of secretions suctioned out from left lung field.  Central line was also discontinued and PICC line placed.  Arterial line discontinued 1/15.    Subjective   Interval History:  Patient  PICC line was accidentally dislodged.  New left IJ triple-lumen catheter was placed.  We are going to wean patient sedation and see how she does and hopefully perform spontaneous breathing trial.   "Had a bowel movement yesterday.  Tolerating tube feeds okay.  Fever curve improving.             The patient's past medical, surgical and social history were reviewed and updated in Epic as appropriate.       Objective     Infusions:  dexmedetomidine HCl (PRECEDEX) 1,000 mcg in sodium chloride 0.9 % 250 mL infusion, 0.2-1.5 mcg/kg/hr, Last Rate: 1 mcg/kg/hr (01/16/25 1118)  fentanyl 10 mcg/mL,  mcg/hr, Last Rate: 175 mcg/hr (01/16/25 1118)  heparin, 25 Units/kg/hr, Last Rate: 25 Units/kg/hr (01/16/25 1035)  insulin, 0-100 Units/hr, Last Rate: 18.2 Units/hr (01/16/25 1143)  Pharmacy to Dose Heparin,       Medications:  bumetanide, 1 mg, Intravenous, Daily  castor oil-balsam peru, 1 Application, Topical, Q12H  cefepime, 2,000 mg, Intravenous, Q8H  chlorhexidine, 15 mL, Mouth/Throat, Q12H  famotidine, 20 mg, Intravenous, BID  Linezolid, 600 mg, Intravenous, BID  magnesium sulfate, 2 g, Intravenous, Q2H  methylnaltrexone, 12 mg, Subcutaneous, Every Other Day  nystatin, , Topical, Q12H  senna-docusate sodium, 2 tablet, Nasogastric, BID   And  polyethylene glycol, 17 g, Nasogastric, Daily        Vital Sign Min/Max for last 24 hours  Temp  Min: 97.6 °F (36.4 °C)  Max: 100.5 °F (38.1 °C)   BP  Min: 114/65  Max: 180/106   Pulse  Min: 59  Max: 104   Resp  Min: 24  Max: 27   SpO2  Min: 88 %  Max: 100 %   Flow (L/min) (Oxygen Therapy)  Min: 50  Max: 50       Input/Output for last 24 hour shift  01/15 0701 - 01/16 0700  In: 6594.2 [I.V.:3575.2]  Out: 3851 [Urine:2650]   Mode: VC+/AC  FiO2 (%):  [45 %-50 %] 45 %  S RR:  [24] 24  S VT:  [380 mL] 380 mL  PEEP/CPAP (cm H2O):  [8 cm H20] 8 cm H20  MAP (cm H2O):  [13-15] 14  Objective:  Vital signs: (most recent): Blood pressure 140/87, pulse 97, temperature 98.4 °F (36.9 °C), temperature source Bladder, resp. rate 24, height 162.6 cm (64.02\"), weight (!) 165 kg (363 lb 4.8 oz), SpO2 98%, not currently breastfeeding.            General Appearance:  Not in distress, no asynchrony " with mechanical ventilator.  Head:  Pupils reactive & symmetrical B/L.  Neck:  Endotracheal tube clean.   Lungs:   B/L Breath sounds present with decreased breath sounds on bases, no wheezing heard, occ crackles.   Heart: S1 and S2 present, no murmur  Abdomen: Obese, soft, bowel sounds hypoactive  Extremities:   + edema, warm to touch.  Neurologic:  Pt starting to wake up and following some simple commands left IJ triple-lumen catheter in good position.    Ventilator settings: A/C: FiO2 45 PEEP 8      Results from last 7 days   Lab Units 01/16/25  0558 01/15/25  0430 01/14/25  0451   WBC 10*3/mm3 5.17 8.30 8.66   HEMOGLOBIN g/dL 7.9* 8.1* 7.9*   PLATELETS 10*3/mm3 141 154 160     Results from last 7 days   Lab Units 01/16/25  0558 01/15/25  1535 01/15/25  0430 01/14/25  1426 01/14/25  0451   SODIUM mmol/L 136  --  140  --  142   POTASSIUM mmol/L 3.4* 3.6 3.0*  3.0* 2.8* 2.8*   CO2 mmol/L 24.0  --  28.0  --  33.0*   BUN mg/dL 31*  --  30*  --  32*   CREATININE mg/dL 0.82  --  0.87  --  1.14*   MAGNESIUM mg/dL 1.3*  --  2.2 1.2* 1.8   PHOSPHORUS mg/dL 3.9  --  3.3  --  2.9   GLUCOSE mg/dL 241*  --  173*  --  198*     Estimated Creatinine Clearance: 145.1 mL/min (by C-G formula based on SCr of 0.82 mg/dL).    Results from last 7 days   Lab Units 01/16/25  0543   PH, ARTERIAL pH units 7.336*   PCO2, ARTERIAL mm Hg 45.9*   PO2 ART mm Hg 92.9       Images:   Abdominal x-ray reviewed and Showed tube is possibly within the antrum of stomach.      Chest x-ray reviewed and endotracheal tube above anna.   Left base atelectasis/small effusion but overall improved from few days ago.  Right lung is clear.    Patient's results and images.     Assessment & Plan   Impression        Acute respiratory failure with hypercapnia    MARIAA (acute kidney injury)    Type 2 diabetes mellitus with hyperglycemia    Sepsis       Plan        1.  Patient presented here with respiratory failure currently on mechanical ventilation. Patient found to  have MRSA pneumonia.  Started on Zyvox.  1 out of 2 blood culture positive for coag negative staph.  Repeat cultures have been negative likely contaminant.  Will await repeat cultures.  Arterial line was discontinued.  PICC line was accidentally pulled out so received new left IJ catheter.  Fever curve is improving.  Appreciate ID team's help.  2.  Continue heparin drip with history of atrial fibrillation and factor V Leiden with previous thromboembolic disease.  Seems to be tolerating well.  No acute bleed noted.  Hemoglobin low but stable.  3.  Continue current mechanical ventilation. Continue with pulmonary toilet.   will wean sedation and perform spontaneous breathing trial and see how patient tolerates that.  Hopefully we can extubate her sooner otherwise will need tracheostomy as patient is on for 2 weeks.  4.  Renal function improving.  Nephrology team is following.  Continuing on diuresis.  Will continue to follow electrolytes and replace aggressively per ICU protocol.  5.  Continue Relistor.  Continue with bowel regimen. Had enema yesterday and had a bowel movement.   6.  Continue enteral nutrition as tolerated.    7.  Continue insulin drip to manage hyperglycemia.  Patient is requiring high-dose insulin but apparently takes high-dose insulin at home as well.  8.  GI prophylaxis.    Continue supportive care.  Patient remains at high risk of decline.  Check labs again in the morning.    Plan of care and goals reviewed with multidisciplinary/antibiotic stewardship team during rounds.   I discussed the patient's findings and my recommendations with family and nursing staff     Time spent Critical care 30 min (exclusive of procedure time)  including high complexity decision making to assess, manipulate, and support vital organ system failure in this individual who has impairment of one or more vital organ systems such that there is a high probability of imminent or life threatening deterioration in the  patient’s condition.  Above documentation reviewed and reflect accurate information as of 1/16/2025 including copied elements of the note.       Vince Toscano MD, FCCP  Pulmonary, Critical care and Sleep Medicine

## 2025-01-16 NOTE — PROGRESS NOTES
"  INFECTIOUS DISEASES  INPATIENT PROGRESS NOTE  2025      PATIENT NAME: Natalia Arzate  :  1985  MRN:  [id number]  Date of Admission:  2025      Antimicrobials:  Linezolid - started 25  Cefepime - started 25      MAR reviewed.      Reason for consultation:  fevers    Interval history:  Patient remains intubated/sedated on mechanical ventilation in ICU.  Fever improving.  WBC wnl.  PICC accidentally removed overnight and new IJ placed.  Blood cx with one bottle CoNS.  BAL culture with prelim GNR and Staph aureus.    ROS:  Unable to obtain due to current medical condition, intubation, and altered mental status.      Objective:  Temp (24hrs), Av.4 °F (37.4 °C), Min:97.6 °F (36.4 °C), Max:100.5 °F (38.1 °C)    /70 (BP Location: Right leg, Patient Position: Lying)   Pulse 76   Temp 99 °F (37.2 °C) (Bladder)   Resp 24   Ht 162.6 cm (64.02\")   Wt (!) 165 kg (363 lb 4.8 oz)   LMP  (LMP Unknown) Comment: last menses 6 years ago (HCG negative today)  SpO2 96%   BMI 62.33 kg/m²     Mode: PS  FiO2 (%):  [45 %] 45 %  S RR:  [24] 24  S VT:  [380 mL] 380 mL  PEEP/CPAP (cm H2O):  [8 cm H20] 8 cm H20  ID SUP:  [12 cm H20] 12 cm H20  MAP (cm H2O):  [12-16] 12      Physical Examination:  GENERAL: Acutely on chronically ill-appearing female.  Obese.  Intubated and sedated on mechanical ventilation in the ICU.  LINES: Right IJ temp HD cath.  Left IJ CVL.  HEENT: Normocephalic, atraumatic.  Eyes closed.    CV: RRR. No murmur, rubs, gallops.  Normal S1S2.  LUNGS: Clear to auscultation bilaterally without wheezing, rales, rhonchi. Normal respiratory effort on vent.  Decreased BS B.  ABDOMEN: Positive bowel sounds.  Soft, nontender, nondistended.  No rebound or guarding.  Large pannus.  No rash underneath pannus.  EXT:  No clubbing, cyanosis, or edema.  :  +Urena catheter, draining clear yellow urine.  MSK: No joint effusions or inflammation noted.  SKIN: Warm and dry without rash or " ulcer.  NEURO: sedated    Laboratory Data:    Results from last 7 days   Lab Units 01/16/25  0558 01/15/25  0430 01/14/25  0451   WBC 10*3/mm3 5.17 8.30 8.66   HEMOGLOBIN g/dL 7.9* 8.1* 7.9*   HEMATOCRIT % 25.9* 26.1* 24.9*   PLATELETS 10*3/mm3 141 154 160     Results from last 7 days   Lab Units 01/16/25  0558   SODIUM mmol/L 136   POTASSIUM mmol/L 3.4*   CHLORIDE mmol/L 95*   CO2 mmol/L 24.0   BUN mg/dL 31*   CREATININE mg/dL 0.82   GLUCOSE mg/dL 241*   CALCIUM mg/dL 9.5     Results from last 7 days   Lab Units 01/14/25  0451   ALK PHOS U/L 207*   BILIRUBIN mg/dL 0.3   ALT (SGPT) U/L 9   AST (SGOT) U/L 22         Results from last 7 days   Lab Units 01/10/25  0842   CRP mg/dL 13.22*     Estimated Creatinine Clearance: 145.1 mL/min (by C-G formula based on SCr of 0.82 mg/dL).  Results from last 7 days   Lab Units 01/11/25  1456   LACTATE mmol/L 0.5         Results from last 7 days   Lab Units 01/12/25  0525 01/11/25  0526 01/10/25  1617   VANCOMYCIN TR mcg/mL  --  22.40*  --    VANCOMYCIN RM mcg/mL 10.20  --  8.70             Microbiology:    Microbiology Results (last 10 days)       Procedure Component Value - Date/Time    Respiratory Culture - Sputum, ET Suction [[id number]] Collected: 01/14/25 1857    Lab Status: Preliminary result Specimen: Sputum from ET Suction Updated: 01/15/25 1207     Respiratory Culture Light growth (2+) The culture consists of normal respiratory hafsa. This is a preliminary report; final report to follow.     Gram Stain Many (4+) WBCs per low power field      Rare (1+) Epithelial cells per low power field      Few (2+) Gram positive cocci in pairs, chains and clusters    Respiratory Culture - Wash, Lung, Left Lower Lobe [[id number]]  (Abnormal) Collected: 01/14/25 1608    Lab Status: Preliminary result Specimen: Wash from Lung, Left Lower Lobe Updated: 01/16/25 1214     Respiratory Culture Light growth (2+) Gram Negative Bacilli      Light growth (2+) Staphylococcus aureus     Comment:    Considering site of infection and appropriate dosing, oxacillin (or cefoxitin) results can be applied to cefazolin, nafcillin, ampicillin/sulbactam, dicloxacillin, amoxicillin/clavulanate, cephalexin, and cefpodoxime.         Rare growth Normal Respiratory Margo     Gram Stain Many (4+) WBCs per low power field      Rare (1+) Epithelial cells per low power field      Rare (1+) Gram positive cocci in pairs and clusters    Blood Culture - Blood, Blood, Arterial Line [[id number]]  (Abnormal) Collected: 01/14/25 1434    Lab Status: Preliminary result Specimen: Blood, Arterial Line Updated: 01/16/25 0532     Blood Culture Abnormal Stain     Gram Stain Anaerobic Bottle Gram positive cocci in pairs and clusters    Blood Culture ID, PCR - Blood, Blood, Arterial Line [[id number]]  (Abnormal) Collected: 01/14/25 1434    Lab Status: Final result Specimen: Blood, Arterial Line Updated: 01/16/25 0657     BCID, PCR Staph spp, not aureus or lugdunensis. Identification by BCID2 PCR.     BOTTLE TYPE Anaerobic Bottle    Blood Culture - Blood, Hand, Right [[id number]]  (Normal) Collected: 01/14/25 1430    Lab Status: Preliminary result Specimen: Blood from Hand, Right Updated: 01/16/25 1500     Blood Culture No growth at 2 days    Narrative:      Less than seven (7) mL's of blood was collected.  Insufficient quantity may yield false negative results.    Blood Culture - Blood, Hand, Left [[id number]]  (Normal) Collected: 01/08/25 1624    Lab Status: Final result Specimen: Blood from Hand, Left Updated: 01/13/25 1731     Blood Culture No growth at 5 days    Blood Culture - Blood, Blood, Arterial Line [[id number]]  (Normal) Collected: 01/08/25 1602    Lab Status: Final result Specimen: Blood, Arterial Line Updated: 01/13/25 1731     Blood Culture No growth at 5 days                Radiology:  XR Chest 1 View    Result Date: 1/16/2025  Impression: 1.New left internal jugular central venous catheter terminates in the lower SVC. 2.Stable left  basilar consolidation and pleural effusion. 3.Stable endotracheal tube, gastric suction tube and feeding tube. Electronically Signed: Tod Pickens MD  1/16/2025 5:24 AM EST  Workstation ID: [id number]    XR Chest 1 View    Result Date: 1/15/2025  Impression: Interval placement of a left upper extremity PICC line. Redemonstration of dense retrocardiac and left base opacities suggestive of left lower lobe atelectasis. Left-sided pleural effusion is difficult to exclude. Electronically Signed: Jimmie Santana MD  1/15/2025 8:22 AM EST  Workstation ID: [id number]         DISCUSSION:  39 y.o. female with history of factor V Leiden, obesity, and uncontrolled type 2 diabetes admitted with hyperglycemia.   Extended intubation with 14-day hospitalization, now with fever.     PROBLEM LIST:   -- Fever, hospital onset - improving on linezolid/cefepime.  With prolonged ICU stay at 2 weeks with new fever, suspect nosocomial infection.  Consider central line associated bloodstream infection, catheter associated urinary tract infection, ventilator associated pneumonia.  UA not collected.  Blood cultures with one bottle CoNS, likely contaminant.  Prior CVL removed.  BAL culture with GNR and Staph aureus, likely HAP/VAP.  -- Acute hypoxic respiratory failure, intubated since 1/1/2025.    -- Bibasilar pulmonary infiltrates in the setting of fever and extended intubation, risk for ventilator associated pneumonia.  -- Indwelling Urena catheter, risk for catheter associated UTI.  -- Right IJ central line in place from outside ER, risk for CLABSI.  -- MRSA pneumonia, previously treated with vancomycin for 5 days, currently with linezolid.  -- Positive blood cultures.  Blood cultures x 2 from 1/1/2025 with 1 bottle positive for Gemella sanguinis and 1 bottle and the other set positive for coagulase-negative Staphylococcus.  CoNS likely contaminant.  Unclear significance for Gemella species - was treated with 10 days aminopenicillin  (ampicillin followed by Unasyn).  Repeat blood cultures were no growth.  -- Obesity.  Skin appears intact at the fold without evidence for breakdown or infection.  -- Uncontrolled type 2 diabetes, contributing to propensity for infection and poor healing.  -- Factor V Leiden.  -- Listed penicillin allergy but tolerated ampicillin.    PLAN:  -- Continue linezolid and cefepime for for Staph aureus and GNR in BAL culture  -- Follow final BAL culture results and adjust abx  -- Monitor vitals, exam, and labs  -- Monitor for adverse reactions to antimicrobials    Complex case.  Discussed with family at bedside.    Rj Boyd MD  1/16/2025  15:58 EST

## 2025-01-16 NOTE — PROGRESS NOTES
Clinical Nutrition     Patient Name: Natalia Arzate  YOB: 1985  MRN: [id number]  Date of Encounter: 25 10:38 EST  Admission date: 2025  Reason for Visit: Follow-up protocol, EN    Assessment   Nutrition Assessment   Admission Diagnosis:  Acute respiratory failure [J96.00]    Problem List:    Acute respiratory failure with hypercapnia    MARIAA (acute kidney injury)    Type 2 diabetes mellitus with hyperglycemia    Sepsis      PMH:   She  has a past medical history of A-fib, Abnormal ECG, Anemia, Anxiety, Asthma, Cancer, Depression, Diabetes mellitus, DVT (deep venous thrombosis), Factor 5 Leiden mutation, heterozygous, Fibroid, GERD (gastroesophageal reflux disease), Gout, H/O abdominal abscess, History of sepsis, History of transfusion, Hyperlipidemia, Hypothyroid, Kidney stone, Migraines, Neuropathy, Ovarian cancer (2021), Ovarian cyst, PE (pulmonary embolism), Polycystic ovary syndrome, Preeclampsia, Rh incompatibility, Stroke, TIA (transient ischemic attack), Urinary tract infection, and Varicella.    PSH:  She  has a past surgical history that includes  section; Cholecystectomy; Colonoscopy; Cardiac catheterization; Right oophorectomy; Abdominal surgery; Esophagogastroduodenoscopy; ureteroscopy laser lithotripsy with stent insertion (Left, 10/01/2021); Extracorporeal shock wave lithotripsy (Left, 10/22/2021); Lithotripsy; ureteroscopy laser lithotripsy with stent insertion (Left, 2022); ureteroscopy laser lithotripsy with stent insertion (Left, 2022); Nephrectomy (Left, 10/2022); Hysterectomy (); and Insert Central Line At Bedside (2025).    Substance history: Opioids    Applicable Nutrition History:     ARF/VENT  25    s/p Bronch 25    MARIAA- CRRT 24---stopped 1-10-25    HD done once 25    s/p Bronch 25    SKIN: L flank -wound, sacrum/coccyx- areas of friction damage, area purple slow to becki    Labs    Labs  Reviewed: Yes    Results from last 7 days   Lab Units 01/16/25  0558 01/15/25  1535 01/15/25  0430 01/14/25  1426 01/14/25  0451 01/12/25  0525 01/11/25  1456 01/10/25  1617 01/10/25  0842   GLUCOSE mg/dL 241*  --  173*  --  198*   < >  --    < > 76   BUN mg/dL 31*  --  30*  --  32*   < >  --    < > 16   CREATININE mg/dL 0.82  --  0.87  --  1.14*   < >  --    < > 0.77   SODIUM mmol/L 136  --  140  --  142   < >  --    < > 134*   CHLORIDE mmol/L 95*  --  97*  --  98   < >  --    < > 97*   POTASSIUM mmol/L 3.4* 3.6 3.0*  3.0* 2.8* 2.8*   < >  --    < > 4.1   PHOSPHORUS mg/dL 3.9  --  3.3  --  2.9  --   --    < > 5.1*   MAGNESIUM mg/dL 1.3*  --  2.2 1.2* 1.8   < >  --    < > 2.3  2.3   ALT (SGPT) U/L  --   --   --   --  9  --   --   --  8   LACTATE mmol/L  --   --   --   --   --   --  0.5  --   --     < > = values in this interval not displayed.       Results from last 7 days   Lab Units 01/14/25  0451 01/11/25  0837 01/10/25  2319 01/10/25  1617 01/10/25  0842   ALBUMIN g/dL 3.5 3.2* 3.1* 3.4* 3.2*   CRP mg/dL  --   --   --   --  13.22*   IONIZED CALCIUM mmol/L  --  1.19 1.14* 1.15 1.13*   CHOLESTEROL mg/dL  --   --   --   --  114   TRIGLYCERIDES mg/dL  --   --   --   --  368*       Results from last 7 days   Lab Units 01/16/25  1033 01/16/25  0936 01/16/25  0823 01/16/25  0722 01/16/25  0610 01/16/25  0451   GLUCOSE mg/dL 132* 137* 182* 212* 248* 250*     Lab Results   Lab Value Date/Time    HGBA1C 9.10 (H) 01/01/2025 2133    HGBA1C 9.6 (H) 12/16/2024 1154    HGBA1C 9.9 (H) 10/15/2024 0505                   Medications    Medications Reviewed: yes    Scheduled Meds:bumetanide, 1 mg, Intravenous, Daily  castor oil-balsam peru, 1 Application, Topical, Q12H  cefepime, 2,000 mg, Intravenous, Q8H  chlorhexidine, 15 mL, Mouth/Throat, Q12H  famotidine, 20 mg, Intravenous, BID  Linezolid, 600 mg, Intravenous, BID  magnesium sulfate, 2 g, Intravenous, Q2H  methylnaltrexone, 12 mg, Subcutaneous, Every Other Day  nystatin, ,  "Topical, Q12H  senna-docusate sodium, 2 tablet, Nasogastric, BID   And  polyethylene glycol, 17 g, Nasogastric, Daily  potassium chloride, 20 mEq, Intravenous, Q1H      Continuous Infusions:dexmedetomidine HCl (PRECEDEX) 1,000 mcg in sodium chloride 0.9 % 250 mL infusion, 0.2-1.5 mcg/kg/hr, Last Rate: 1.2 mcg/kg/hr (01/16/25 1025)  fentanyl 10 mcg/mL,  mcg/hr, Last Rate: 250 mcg/hr (01/16/25 1026)  heparin, 25 Units/kg/hr, Last Rate: 25 Units/kg/hr (01/16/25 1035)  insulin, 0-100 Units/hr, Last Rate: 14.8 Units/hr (01/16/25 1034)  Pharmacy to Dose Heparin,       PRN Meds:.  acetaminophen    senna-docusate sodium **AND** polyethylene glycol **AND** [DISCONTINUED] bisacodyl **AND** bisacodyl    dextrose    fentaNYL    glucagon (human recombinant)    ipratropium    ipratropium-albuterol    Magnesium Standard Dose Replacement - Follow Nurse / BPA Driven Protocol    midazolam    Pharmacy to Dose Heparin    Polyvinyl Alcohol-Povidone PF    Potassium Replacement - Follow Nurse / BPA Driven Protocol    sodium chloride    sodium chloride    Intake/Ouptut 24 hrs (0701 - 0700)   I&O's Reviewed: yes    Intake & Output (last day)         01/15 0701  01/16 0700 01/16 0701  01/17 0700    I.V. (mL/kg) 3575.2 (21.7)     Other 461     NG/GT 1433     IV Piggyback 1125     Total Intake(mL/kg) 6594.2 (40)     Urine (mL/kg/hr) 2650 (0.7)     Emesis/NG output 1200     Stool 1     Total Output 3851     Net +2743.2                  Anthropometrics     Height: Height: 162.6 cm (64.02\")64in  Last Filed Weight: Weight: (!) 165 kg (363 lb 4.8 oz) (01/13/25 0600)350lb  Method: Weight Method: Bed scaleest  BMI: BMI (Calculated): 62.360    UBW: last MD office weight 336lb    Weight change:  ? 27lb wt gain since January     Weight       Weight (kg) Weight (lbs) Weight Method Visit Report   5/24/2023 129.729 kg  286 lb  Stated  --     127.007 kg  280 lb   --    6/24/2023 127.007 kg  280 lb  Stated     7/6/2023 --  --   --    7/26/2023 129.275 " kg  285 lb      7/27/2023 132.904 kg  293 lb   --    8/15/2023 124.739 kg  275 lb  Stated     8/17/2023 127.007 kg  280 lb  Stated     9/10/2023 129.275 kg  285 lb  Stated     10/6/2023 140.161 kg (H)  309 lb (H)   --    11/7/2023 133.811 kg  295 lb  Stated     11/20/2023 133.811 kg  295 lb      11/24/2023 133.358 kg  294 lb      11/26/2023 133.811 kg  295 lb  Stated     1/2/2024 140.161 kg (H)  309 lb (H)  Stated     2/13/2024 145.605 kg (H)  321 lb (H)  Stated     3/12/2024 --  --   --    4/16/2024 142.611 kg (H)  314 lb 6.4 oz (H)   --    4/18/2024 142.429 kg (H)  314 lb (H)  Stated     4/26/2024 138.801 kg (H)  306 lb (H)  Stated     5/1/2024 138.801 kg (H)  306 lb (H)   --    5/6/2024 146.512 kg (H)  323 lb (H)   --    5/13/2024 146.512 kg (H)  323 lb (H)  Stated     7/8/2024 142.883 kg (H)  315 lb (H)  Stated     7/31/2024 147.691 kg (H)  325 lb 9.6 oz (H)   --    9/22/2024 143.79 kg (H)  317 lb (H)  Stated     12/3/2024 153.588 kg (H)  338 lb 9.6 oz (H)   --    12/16/2024 153.316 kg (H)  338 lb (H)   --     152.409 kg (H)  336 lb (H)   --    1/1/2025 158.759 kg (H)  350 lb (H)  Estimated        Legend:  (H) High  Nutrition Focused Physical Exam     Date:     Unable to perform due to Pt unable to participate at time of visit     Subjective   Reported/Observed/Food/Nutrition Related History:     1-16: pt intubated, sedated   + fentanyl, precedex, heparin, insulin    Per RN: pt tolerating TF, had small bm last night    1-14-25: pt intubated, sedated, fiancee at bedside  + fentanyl, precedex, insulin    Per RN: TF not restarted yesterday, unclear why, plan for bronch today    Plan to resume TF s/p Bronch    1-10: pt intubated sedated, remains on CRRT  + fentanyl, heparin    Per RN: pt has been off pressors since last night, is less puffy, able to tolerate turning, has not had a bm, had good bowel sound    Per MD: ok to resume TF    1-8: pt intubated, sedated + fentanyl, versed,levophed 0.06mcg, bicarb  "@75ml/hr    Per RN: pt had 800ml GRV last night TF on hold, is super acidotic, line placed for dialysis, plan to start CRRT     1-6: pt intubated, sedated, fiancee at bedside  + fentanyl, versed, insulin    Per RN:pt s/p emergent bronch Saturday,  trophic feed started yesterday    Per MD ok to advance TF    1-3: Pt intubated, sedated, fiancee at bedside  + insulin, fentanyl, versed, heparin, amio    Per RN: pt had wide complex rhythm last night, lokelma on hold as K+ wnl, 103 temp    Per MD ok to start TF,  place keofeed    Needs Assessment   Date: 1-3-25    Height used:Height: 162.6 cm (64.02\")64in  Weights used/ ABW: 336lb/ 153kg   IBW: 120lb/ 55kg    Estimated Calorie needs: ~1500kcal  Method:  22-25Kcals/KG IBW: 1210-1375kcal  Method:  MSJ ABW: 2195kcal  Method:  PSU ABW: 2745kcal    Estimated Protein needs: ~ 138g protein  Method: 2.5g protein per kg IBW: 138g protein  Method: 0.8-1g protein per kg ABW: 122-153g protein    Current Nutrition Prescription     PO: NPO Diet NPO Type: Strict NPO  Oral Nutrition Supplement:  Intake: N/A    EN: Peptamen Intense VHP  Goal Rate: 75ml  Water Flushes: 25ml  Modular: None  Route: NG   (keofeed replaced into 4th portion of duodenum 25)  Tube: Small bore    At goal over: 20Hrs/day     Rx will supply:   Goal Volume 1500  mL/day     Flush Volume 500 mL/day     Energy 1500 Kcal/day 100 % Est Need   Protein 138 g/day 100 % Est Need   Fiber 6 g/day     Water in  EN 1260 mL     Total Water 1760 mL     Meet DRI Yes        --------------------------------------------------------------------------  Product/Rate verified at bedside: Yes  Infusing Rate at time of visit: 75    Average Delivery from [facility] Day:   Volume 1333 mL/day 89  % Goal Vol.   Flush Volume 561 mL/day     Energy  Kcal/day  % Est Need   Protein  g/day  % Est Need   Fiber  g/day     Water in  EN  mL     Total Water  mL     Meet DRI No           Assessment & Plan   Nutrition Diagnosis   Date: 1-3-25  Updated: " 1-16-25  Problem Inadequate energy intake    Etiology ARF/VENT   Signs/Symptoms 89% goal volume EN   Status: Active -improved    Goal:   Nutrition to support treatment, Improve nutrition related labs and Maintain EN      Nutrition Intervention      Follow treatment progress, Care plan reviewed      Monitoring/Evaluation:   Per protocol, I&O, Pertinent labs, Weight, Skin status, GI status, Symptoms, Hemodynamic stability    Maria Del Carmen Matamoros, LOUIE  Time Spent: 30min

## 2025-01-16 NOTE — PLAN OF CARE
Goal Outcome Evaluation:  Plan of Care Reviewed With: other (see comments) (RN)           Outcome Evaluation: Pt present with complex wound to L lateral back.  PT was able to MIST the area to help decrease inflammation, improve skin integrity and decrease further potential breakdown. PT will cont with MIST 2-3x/week to help further promote wound healing.

## 2025-01-16 NOTE — PROCEDURES
CENTRAL VENOUS CATHETER PLACEMENT       Name: Natalia Arzate MRN: [id number] : 1985-39 y.o.-female    Procedure Performed: CVC Insertion using real-time ultrasonography    Performed by: Qian Umana MD    Indications: vascular access    Consent: the procedure was done emergently as medically necessary    Procedure Details: The Pre-Procedure/Time Out Brief was conducted prior to the procedure. The patient was placed in the supine position. The skin over the puncture site was prepped with chlorhexidine and draped in a sterile fashion. 1% lidocaine was injected for local anesthesia. Hat, mask, sterile gown and gloves were worn at all times during the procedure. Using Seldinger technique, under ultrasound guidance the left IJ vein was identified and cannulated using a needle on a 5 mL syringe. Non-pulsatile blood flow was noted and the syringe removed. Then, a guidewire was passed through the needle into the vein. A small incision was made into the skin, along the guidewire using a surgical blade. The site was then dilated with the dilator provided, so as to create a tract for the catheter. After removing the dilator, a central venous catheter was placed over the wire, and, after insertion, the wire was removed. Using 2 separate sutures, the line was anchored. The area was covered with sterile dressing. The patient tolerated the procedure well.    Complications: none    Signed: Qian Umana MD  2025 05:27 EST

## 2025-01-17 LAB
ALBUMIN SERPL-MCNC: 3.3 G/DL (ref 3.5–5.2)
ALBUMIN SERPL-MCNC: 3.6 G/DL (ref 3.5–5.2)
ALBUMIN/GLOB SERPL: 0.8 G/DL
ALBUMIN/GLOB SERPL: 0.9 G/DL
ALP SERPL-CCNC: 129 U/L (ref 39–117)
ALP SERPL-CCNC: 136 U/L (ref 39–117)
ALT SERPL W P-5'-P-CCNC: 16 U/L (ref 1–33)
ALT SERPL W P-5'-P-CCNC: 16 U/L (ref 1–33)
ANION GAP SERPL CALCULATED.3IONS-SCNC: 10 MMOL/L (ref 5–15)
ANION GAP SERPL CALCULATED.3IONS-SCNC: 11 MMOL/L (ref 5–15)
ARTERIAL PATENCY WRIST A: ABNORMAL
AST SERPL-CCNC: 44 U/L (ref 1–32)
AST SERPL-CCNC: 44 U/L (ref 1–32)
ATMOSPHERIC PRESS: ABNORMAL MM[HG]
BACTERIA SPEC AEROBE CULT: ABNORMAL
BACTERIA SPEC RESP CULT: ABNORMAL
BASE EXCESS BLDA CALC-SCNC: 2.5 MMOL/L (ref 0–2)
BASOPHILS # BLD AUTO: 0.03 10*3/MM3 (ref 0–0.2)
BASOPHILS NFR BLD AUTO: 0.6 % (ref 0–1.5)
BDY SITE: ABNORMAL
BILIRUB SERPL-MCNC: 0.3 MG/DL (ref 0–1.2)
BILIRUB SERPL-MCNC: 0.3 MG/DL (ref 0–1.2)
BODY TEMPERATURE: 37
BUN SERPL-MCNC: 27 MG/DL (ref 6–20)
BUN SERPL-MCNC: 29 MG/DL (ref 6–20)
BUN/CREAT SERPL: 38 (ref 7–25)
BUN/CREAT SERPL: 40.8 (ref 7–25)
CALCIUM SPEC-SCNC: 9.7 MG/DL (ref 8.6–10.5)
CALCIUM SPEC-SCNC: 9.9 MG/DL (ref 8.6–10.5)
CHLORIDE SERPL-SCNC: 93 MMOL/L (ref 98–107)
CHLORIDE SERPL-SCNC: 98 MMOL/L (ref 98–107)
CHOLEST SERPL-MCNC: 147 MG/DL (ref 0–200)
CO2 BLDA-SCNC: 28.5 MMOL/L (ref 22–33)
CO2 SERPL-SCNC: 29 MMOL/L (ref 22–29)
CO2 SERPL-SCNC: 31 MMOL/L (ref 22–29)
COHGB MFR BLD: 1.4 % (ref 0–2)
CREAT SERPL-MCNC: 0.71 MG/DL (ref 0.57–1)
CREAT SERPL-MCNC: 0.71 MG/DL (ref 0.57–1)
CRP SERPL-MCNC: 4.07 MG/DL (ref 0–0.5)
DEPRECATED RDW RBC AUTO: 57.7 FL (ref 37–54)
EGFRCR SERPLBLD CKD-EPI 2021: 111.8 ML/MIN/1.73
EGFRCR SERPLBLD CKD-EPI 2021: 111.8 ML/MIN/1.73
EOSINOPHIL # BLD AUTO: 0.17 10*3/MM3 (ref 0–0.4)
EOSINOPHIL NFR BLD AUTO: 3.1 % (ref 0.3–6.2)
EPAP: 0
ERYTHROCYTE [DISTWIDTH] IN BLOOD BY AUTOMATED COUNT: 17.2 % (ref 12.3–15.4)
GLOBULIN UR ELPH-MCNC: 3.9 GM/DL
GLOBULIN UR ELPH-MCNC: 4.2 GM/DL
GLUCOSE BLDC GLUCOMTR-MCNC: 122 MG/DL (ref 70–130)
GLUCOSE BLDC GLUCOMTR-MCNC: 133 MG/DL (ref 70–130)
GLUCOSE BLDC GLUCOMTR-MCNC: 139 MG/DL (ref 70–130)
GLUCOSE BLDC GLUCOMTR-MCNC: 144 MG/DL (ref 70–130)
GLUCOSE BLDC GLUCOMTR-MCNC: 145 MG/DL (ref 70–130)
GLUCOSE BLDC GLUCOMTR-MCNC: 146 MG/DL (ref 70–130)
GLUCOSE BLDC GLUCOMTR-MCNC: 146 MG/DL (ref 70–130)
GLUCOSE BLDC GLUCOMTR-MCNC: 149 MG/DL (ref 70–130)
GLUCOSE BLDC GLUCOMTR-MCNC: 149 MG/DL (ref 70–130)
GLUCOSE BLDC GLUCOMTR-MCNC: 153 MG/DL (ref 70–130)
GLUCOSE BLDC GLUCOMTR-MCNC: 158 MG/DL (ref 70–130)
GLUCOSE BLDC GLUCOMTR-MCNC: 162 MG/DL (ref 70–130)
GLUCOSE BLDC GLUCOMTR-MCNC: 164 MG/DL (ref 70–130)
GLUCOSE BLDC GLUCOMTR-MCNC: 166 MG/DL (ref 70–130)
GLUCOSE BLDC GLUCOMTR-MCNC: 172 MG/DL (ref 70–130)
GLUCOSE BLDC GLUCOMTR-MCNC: 175 MG/DL (ref 70–130)
GLUCOSE BLDC GLUCOMTR-MCNC: 180 MG/DL (ref 70–130)
GLUCOSE BLDC GLUCOMTR-MCNC: 181 MG/DL (ref 70–130)
GLUCOSE BLDC GLUCOMTR-MCNC: 198 MG/DL (ref 70–130)
GLUCOSE BLDC GLUCOMTR-MCNC: 207 MG/DL (ref 70–130)
GLUCOSE SERPL-MCNC: 138 MG/DL (ref 65–99)
GLUCOSE SERPL-MCNC: 177 MG/DL (ref 65–99)
GRAM STN SPEC: ABNORMAL
HCO3 BLDA-SCNC: 27.2 MMOL/L (ref 20–26)
HCT VFR BLD AUTO: 25.2 % (ref 34–46.6)
HCT VFR BLD CALC: 24.3 % (ref 38–51)
HGB BLD-MCNC: 7.8 G/DL (ref 12–15.9)
HGB BLDA-MCNC: 7.9 G/DL (ref 14–18)
IMM GRANULOCYTES # BLD AUTO: 0.04 10*3/MM3 (ref 0–0.05)
IMM GRANULOCYTES NFR BLD AUTO: 0.7 % (ref 0–0.5)
INHALED O2 CONCENTRATION: 45 %
IPAP: 0
ISOLATED FROM: ABNORMAL
LYMPHOCYTES # BLD AUTO: 0.75 10*3/MM3 (ref 0.7–3.1)
LYMPHOCYTES NFR BLD AUTO: 13.9 % (ref 19.6–45.3)
MAGNESIUM SERPL-MCNC: 1.4 MG/DL (ref 1.6–2.6)
MAGNESIUM SERPL-MCNC: 2 MG/DL (ref 1.6–2.6)
MCH RBC QN AUTO: 29.2 PG (ref 26.6–33)
MCHC RBC AUTO-ENTMCNC: 31 G/DL (ref 31.5–35.7)
MCV RBC AUTO: 94.4 FL (ref 79–97)
METHGB BLD QL: 0.5 % (ref 0–1.5)
MODALITY: ABNORMAL
MONOCYTES # BLD AUTO: 0.43 10*3/MM3 (ref 0.1–0.9)
MONOCYTES NFR BLD AUTO: 7.9 % (ref 5–12)
NEUTROPHILS NFR BLD AUTO: 3.99 10*3/MM3 (ref 1.7–7)
NEUTROPHILS NFR BLD AUTO: 73.8 % (ref 42.7–76)
NRBC BLD AUTO-RTO: 0 /100 WBC (ref 0–0.2)
OXYHGB MFR BLDV: 97.1 % (ref 94–99)
PAW @ PEAK INSP FLOW SETTING VENT: 0 CMH2O
PCO2 BLDA: 41.7 MM HG (ref 35–45)
PCO2 TEMP ADJ BLD: 41.7 MM HG (ref 35–45)
PH BLDA: 7.42 PH UNITS (ref 7.35–7.45)
PH, TEMP CORRECTED: 7.42 PH UNITS
PHOSPHATE SERPL-MCNC: 3.6 MG/DL (ref 2.5–4.5)
PHOSPHATE SERPL-MCNC: 3.8 MG/DL (ref 2.5–4.5)
PLATELET # BLD AUTO: 138 10*3/MM3 (ref 140–450)
PMV BLD AUTO: 9.8 FL (ref 6–12)
PO2 BLDA: 109 MM HG (ref 83–108)
PO2 TEMP ADJ BLD: 109 MM HG (ref 83–108)
POTASSIUM SERPL-SCNC: 3.5 MMOL/L (ref 3.5–5.2)
POTASSIUM SERPL-SCNC: 3.7 MMOL/L (ref 3.5–5.2)
PROT SERPL-MCNC: 7.2 G/DL (ref 6–8.5)
PROT SERPL-MCNC: 7.8 G/DL (ref 6–8.5)
RBC # BLD AUTO: 2.67 10*6/MM3 (ref 3.77–5.28)
SODIUM SERPL-SCNC: 135 MMOL/L (ref 136–145)
SODIUM SERPL-SCNC: 137 MMOL/L (ref 136–145)
TOTAL RATE: 0 BREATHS/MINUTE
TRIGL SERPL-MCNC: 345 MG/DL (ref 0–150)
UFH PPP CHRO-ACNC: 0.41 IU/ML (ref 0.3–0.7)
VENTILATOR MODE: ABNORMAL
WBC NRBC COR # BLD AUTO: 5.41 10*3/MM3 (ref 3.4–10.8)

## 2025-01-17 PROCEDURE — 25010000002 POTASSIUM CHLORIDE PER 2 MEQ: Performed by: INTERNAL MEDICINE

## 2025-01-17 PROCEDURE — 25010000002 ONDANSETRON PER 1 MG

## 2025-01-17 PROCEDURE — 25010000002 CEFEPIME PER 500 MG: Performed by: INTERNAL MEDICINE

## 2025-01-17 PROCEDURE — 83050 HGB METHEMOGLOBIN QUAN: CPT

## 2025-01-17 PROCEDURE — 94003 VENT MGMT INPAT SUBQ DAY: CPT

## 2025-01-17 PROCEDURE — 85520 HEPARIN ASSAY: CPT

## 2025-01-17 PROCEDURE — 84478 ASSAY OF TRIGLYCERIDES: CPT | Performed by: INTERNAL MEDICINE

## 2025-01-17 PROCEDURE — 94799 UNLISTED PULMONARY SVC/PX: CPT

## 2025-01-17 PROCEDURE — 25810000003 SODIUM CHLORIDE 0.9 % SOLUTION 250 ML FLEX CONT: Performed by: INTERNAL MEDICINE

## 2025-01-17 PROCEDURE — 83735 ASSAY OF MAGNESIUM: CPT | Performed by: INTERNAL MEDICINE

## 2025-01-17 PROCEDURE — 80053 COMPREHEN METABOLIC PANEL: CPT | Performed by: INTERNAL MEDICINE

## 2025-01-17 PROCEDURE — 82375 ASSAY CARBOXYHB QUANT: CPT

## 2025-01-17 PROCEDURE — 83735 ASSAY OF MAGNESIUM: CPT | Performed by: PHYSICIAN ASSISTANT

## 2025-01-17 PROCEDURE — 84100 ASSAY OF PHOSPHORUS: CPT | Performed by: INTERNAL MEDICINE

## 2025-01-17 PROCEDURE — 25010000002 PROCHLORPERAZINE 10 MG/2ML SOLUTION

## 2025-01-17 PROCEDURE — 36600 WITHDRAWAL OF ARTERIAL BLOOD: CPT

## 2025-01-17 PROCEDURE — 25010000002 LINEZOLID 600 MG/300ML SOLUTION: Performed by: INTERNAL MEDICINE

## 2025-01-17 PROCEDURE — 86140 C-REACTIVE PROTEIN: CPT | Performed by: INTERNAL MEDICINE

## 2025-01-17 PROCEDURE — 25010000002 HEPARIN (PORCINE) 25000-0.45 UT/250ML-% SOLUTION

## 2025-01-17 PROCEDURE — 85025 COMPLETE CBC W/AUTO DIFF WBC: CPT | Performed by: INTERNAL MEDICINE

## 2025-01-17 PROCEDURE — 82805 BLOOD GASES W/O2 SATURATION: CPT

## 2025-01-17 PROCEDURE — 82948 REAGENT STRIP/BLOOD GLUCOSE: CPT

## 2025-01-17 PROCEDURE — 25010000002 MAGNESIUM SULFATE 2 GM/50ML SOLUTION: Performed by: INTERNAL MEDICINE

## 2025-01-17 PROCEDURE — 25010000002 BUMETANIDE PER 0.5 MG: Performed by: INTERNAL MEDICINE

## 2025-01-17 PROCEDURE — 82465 ASSAY BLD/SERUM CHOLESTEROL: CPT | Performed by: INTERNAL MEDICINE

## 2025-01-17 PROCEDURE — 99291 CRITICAL CARE FIRST HOUR: CPT | Performed by: INTERNAL MEDICINE

## 2025-01-17 PROCEDURE — 25010000002 MEROPENEM PER 100 MG: Performed by: INTERNAL MEDICINE

## 2025-01-17 RX ORDER — PROCHLORPERAZINE EDISYLATE 5 MG/ML
5 INJECTION INTRAMUSCULAR; INTRAVENOUS EVERY 6 HOURS PRN
Status: DISCONTINUED | OUTPATIENT
Start: 2025-01-17 | End: 2025-01-21

## 2025-01-17 RX ORDER — POLYETHYLENE GLYCOL 3350 17 G/17G
17 POWDER, FOR SOLUTION ORAL DAILY PRN
Status: DISCONTINUED | OUTPATIENT
Start: 2025-01-17 | End: 2025-01-21

## 2025-01-17 RX ORDER — HYDROCODONE BITARTRATE AND ACETAMINOPHEN 5; 325 MG/1; MG/1
1 TABLET ORAL EVERY 6 HOURS PRN
Status: DISCONTINUED | OUTPATIENT
Start: 2025-01-17 | End: 2025-01-17

## 2025-01-17 RX ORDER — GABAPENTIN 300 MG/1
300 CAPSULE ORAL EVERY 8 HOURS SCHEDULED
Status: DISCONTINUED | OUTPATIENT
Start: 2025-01-17 | End: 2025-01-17

## 2025-01-17 RX ORDER — GABAPENTIN 300 MG/1
300 CAPSULE ORAL EVERY 8 HOURS SCHEDULED
Status: DISCONTINUED | OUTPATIENT
Start: 2025-01-17 | End: 2025-01-27

## 2025-01-17 RX ORDER — POTASSIUM CHLORIDE 29.8 MG/ML
20 INJECTION INTRAVENOUS
Status: COMPLETED | OUTPATIENT
Start: 2025-01-17 | End: 2025-01-17

## 2025-01-17 RX ORDER — MAGNESIUM SULFATE HEPTAHYDRATE 40 MG/ML
2 INJECTION, SOLUTION INTRAVENOUS
Status: COMPLETED | OUTPATIENT
Start: 2025-01-17 | End: 2025-01-17

## 2025-01-17 RX ORDER — AMOXICILLIN 250 MG
2 CAPSULE ORAL 2 TIMES DAILY PRN
Status: DISCONTINUED | OUTPATIENT
Start: 2025-01-17 | End: 2025-01-21

## 2025-01-17 RX ORDER — BISACODYL 10 MG
10 SUPPOSITORY, RECTAL RECTAL DAILY PRN
Status: DISCONTINUED | OUTPATIENT
Start: 2025-01-17 | End: 2025-01-21

## 2025-01-17 RX ORDER — HYDROCODONE BITARTRATE AND ACETAMINOPHEN 5; 325 MG/1; MG/1
1 TABLET ORAL EVERY 6 HOURS PRN
Status: DISCONTINUED | OUTPATIENT
Start: 2025-01-17 | End: 2025-01-21

## 2025-01-17 RX ORDER — ONDANSETRON 2 MG/ML
4 INJECTION INTRAMUSCULAR; INTRAVENOUS EVERY 6 HOURS PRN
Status: DISCONTINUED | OUTPATIENT
Start: 2025-01-17 | End: 2025-02-03 | Stop reason: HOSPADM

## 2025-01-17 RX ADMIN — MAGNESIUM SULFATE IN WATER FOR 2 G: 40 INJECTION INTRAVENOUS at 11:19

## 2025-01-17 RX ADMIN — HEPARIN SODIUM 25 UNITS/KG/HR: 10000 INJECTION, SOLUTION INTRAVENOUS at 23:53

## 2025-01-17 RX ADMIN — HYDROCODONE BITARTRATE AND ACETAMINOPHEN 1 TABLET: 5; 325 TABLET ORAL at 14:22

## 2025-01-17 RX ADMIN — POTASSIUM CHLORIDE 20 MEQ: 400 INJECTION, SOLUTION INTRAVENOUS at 18:22

## 2025-01-17 RX ADMIN — ONDANSETRON 4 MG: 2 INJECTION INTRAMUSCULAR; INTRAVENOUS at 20:18

## 2025-01-17 RX ADMIN — NYSTATIN: 100000 POWDER TOPICAL at 08:51

## 2025-01-17 RX ADMIN — HYDROCODONE BITARTRATE AND ACETAMINOPHEN 1 TABLET: 5; 325 TABLET ORAL at 20:19

## 2025-01-17 RX ADMIN — FAMOTIDINE 20 MG: 10 INJECTION, SOLUTION INTRAVENOUS at 20:18

## 2025-01-17 RX ADMIN — MEROPENEM 1000 MG: 1 INJECTION, POWDER, FOR SOLUTION INTRAVENOUS at 13:21

## 2025-01-17 RX ADMIN — BUMETANIDE 1 MG: 0.25 INJECTION INTRAMUSCULAR; INTRAVENOUS at 08:47

## 2025-01-17 RX ADMIN — Medication 1 APPLICATION: at 20:30

## 2025-01-17 RX ADMIN — PROCHLORPERAZINE EDISYLATE 5 MG: 5 INJECTION INTRAMUSCULAR; INTRAVENOUS at 15:48

## 2025-01-17 RX ADMIN — CHLORHEXIDINE GLUCONATE 0.12% ORAL RINSE 15 ML: 1.2 LIQUID ORAL at 20:18

## 2025-01-17 RX ADMIN — FAMOTIDINE 20 MG: 10 INJECTION, SOLUTION INTRAVENOUS at 08:47

## 2025-01-17 RX ADMIN — CEFEPIME 2000 MG: 2 INJECTION, POWDER, FOR SOLUTION INTRAVENOUS at 05:31

## 2025-01-17 RX ADMIN — POTASSIUM CHLORIDE 20 MEQ: 400 INJECTION, SOLUTION INTRAVENOUS at 16:45

## 2025-01-17 RX ADMIN — DEXMEDETOMIDINE 0.6 MCG/KG/HR: 200 INJECTION, SOLUTION INTRAVENOUS at 13:20

## 2025-01-17 RX ADMIN — GABAPENTIN 300 MG: 300 CAPSULE ORAL at 12:00

## 2025-01-17 RX ADMIN — PROCHLORPERAZINE EDISYLATE 5 MG: 5 INJECTION INTRAMUSCULAR; INTRAVENOUS at 21:44

## 2025-01-17 RX ADMIN — MAGNESIUM SULFATE IN WATER FOR 2 G: 40 INJECTION INTRAVENOUS at 08:52

## 2025-01-17 RX ADMIN — LINEZOLID 600 MG: 600 INJECTION, SOLUTION INTRAVENOUS at 21:48

## 2025-01-17 RX ADMIN — Medication 1 APPLICATION: at 08:51

## 2025-01-17 RX ADMIN — DEXMEDETOMIDINE 1.1 MCG/KG/HR: 200 INJECTION, SOLUTION INTRAVENOUS at 05:18

## 2025-01-17 RX ADMIN — INSULIN HUMAN 17.2 UNITS/HR: 1 INJECTION, SOLUTION INTRAVENOUS at 11:19

## 2025-01-17 RX ADMIN — LINEZOLID 600 MG: 600 INJECTION, SOLUTION INTRAVENOUS at 08:47

## 2025-01-17 RX ADMIN — ONDANSETRON 4 MG: 2 INJECTION INTRAMUSCULAR; INTRAVENOUS at 13:35

## 2025-01-17 RX ADMIN — HEPARIN SODIUM 25 UNITS/KG/HR: 10000 INJECTION, SOLUTION INTRAVENOUS at 13:20

## 2025-01-17 RX ADMIN — CHLORHEXIDINE GLUCONATE 0.12% ORAL RINSE 15 ML: 1.2 LIQUID ORAL at 08:47

## 2025-01-17 RX ADMIN — INSULIN HUMAN 6.5 UNITS/HR: 1 INJECTION, SOLUTION INTRAVENOUS at 20:42

## 2025-01-17 RX ADMIN — HEPARIN SODIUM 25 UNITS/KG/HR: 10000 INJECTION, SOLUTION INTRAVENOUS at 06:06

## 2025-01-17 RX ADMIN — MEROPENEM 1000 MG: 1 INJECTION, POWDER, FOR SOLUTION INTRAVENOUS at 20:18

## 2025-01-17 RX ADMIN — MAGNESIUM SULFATE IN WATER FOR 2 G: 40 INJECTION INTRAVENOUS at 07:01

## 2025-01-17 RX ADMIN — INSULIN HUMAN 22.6 UNITS/HR: 1 INJECTION, SOLUTION INTRAVENOUS at 06:04

## 2025-01-17 RX ADMIN — NYSTATIN: 100000 POWDER TOPICAL at 20:30

## 2025-01-17 RX ADMIN — GABAPENTIN 300 MG: 300 CAPSULE ORAL at 22:37

## 2025-01-17 NOTE — PLAN OF CARE
Goal Outcome Evaluation:    Problem: Adult Inpatient Plan of Care  Goal: Plan of Care Review  Outcome: Progressing  Flowsheets (Taken 1/17/2025 0188)  Progress: improving  Plan of Care Reviewed With:   patient   family     -Patient extubated today, on 5LNC  -Oriented, voice very hoarse post extubation  -Requesting pain medication post extubation, discussed w/ provider and Norco ordered   -Vomited x 1 (bile) once post extubation, no aspiration suspected at present, Compazine ordered   -Patient remains diaphoretic but afebrile   -UOP 2700+ w/ Bumex 1mg, 2 very large Bms, extensive time spent on catheter and veronica care post BMs

## 2025-01-17 NOTE — CASE MANAGEMENT/SOCIAL WORK
Continued Stay Note  [facility]     Patient Name: Natalia Arzate  MRN: [id number]  Today's Date: 1/17/2025    Admit Date: 1/2/2025    Plan: ongoing   Discharge Plan       Row Name 01/17/25 1612       Plan    Plan ongoing    Patient/Family in Agreement with Plan yes    Plan Comments Discussed in MDR, mike, SBT today w/plans of extubation. I met w/patient post, introduced myself and explained that I help w/d/c planning when medically ready. Patient a little groggy, told her I will f/u w/her on Monday. Goal is home. Nephrology signed off. ID following changed Cefepime to  Merrem. D/C TBd. CM will continue to follow.                   Discharge Codes    No documentation.                 Expected Discharge Date and Time       Expected Discharge Date Expected Discharge Time    Jan 17, 2025               Rj Valle RN

## 2025-01-17 NOTE — PLAN OF CARE
Problem: Adult Inpatient Plan of Care  Goal: Plan of Care Review  Outcome: Progressing  Flowsheets (Taken 1/17/2025 0501)  Progress: improving  Outcome Evaluation: Patient rested well tonight on 1.0-1.1 of precidex. Patient remains on 45% PEEP 8 and oxygen remains >92%. Patient denies pain. Patient nods appropriately and follows commands well. Patient remains afebrile, Tmax 99.9. Insulin gtt continues and patient tollerating well. Heparin gtt continues. Awaiting morning labs. Plan is for patient to be extubated today if she tolerates spontaneous breathing trials this morning. Continue to monitor closely, continue with current plan of care.  Plan of Care Reviewed With: patient

## 2025-01-17 NOTE — PROGRESS NOTES
Intensive Care Follow-up     Hospital:  LOS: 15 days   Ms. Natalia Arzate, 39 y.o. female is followed for:   Acute respiratory failure with hypercapnia            History of present illness:    40yo F with a history of HLD, Hypothyroidism, Afib, PE/DVT, Factor V Leiden mutation, and stroke who presented to [facility] with altered mental status. Labs there were significant for renal failure, hyperglycemia, and hypercapnic respiratory failure. Imaging consistent with basilar pulmonary infiltrates concerning for pneumonia. She was intubated and required the initiation of vasopressors. She was transferred to [facility] on 1/2/25 for ongoing care.      Since arrival, she has continued to require mechanical ventilation along with vasopressors. She has been started on Vancomycin, Flagyl and Cefepime. Cultures are significant for MRSA in the sputum along with gemella sanguinis in the blood.      Nephrology has been following for MARIAA with poor urine output and hyperkalemia.      On the morning of 1/4/25, she developed worsening hypoxia despite an FiO2 of 100% and PEEP of 16. Imaging showed white out of the left lung. Bronchoscopy was performed with mucopurulent secretions suctioned from the left upper and left lower lobes. Repeat CXR after bronchoscopy showed some improvement in left upper lobe aeration with persistent left lower lobe disease vs pleural effusion    Patient has worsening renal failure and started on CRRT which eventually was transitioned to intermittent hemodialysis    Due to persistent fevers bronchoscopy was done on 1/14 with large amount of secretions suctioned out from left lung field.  Central line was also discontinued and PICC line placed.  Arterial line discontinued 1/15.  Patient  PICC line was accidentally dislodged.  New left IJ triple-lumen catheter was placed 1/16.      Subjective   Interval History:  Patient underwent spontaneous breathing trial.  He was much more awake and alert.  Able to extubate the  "patient.  Still having fevers.  Growing ESBL Klebsiella and MRSA.  Antibiotics adjusted.             The patient's past medical, surgical and social history were reviewed and updated in Epic as appropriate.       Objective     Infusions:  dexmedetomidine HCl (PRECEDEX) 1,000 mcg in sodium chloride 0.9 % 250 mL infusion, 0.2-1.5 mcg/kg/hr, Last Rate: 0.6 mcg/kg/hr (01/17/25 1320)  heparin, 25 Units/kg/hr, Last Rate: 25 Units/kg/hr (01/17/25 1320)  insulin, 0-100 Units/hr, Last Rate: 11.3 Units/hr (01/17/25 1541)  Pharmacy to Dose Heparin,       Medications:  bumetanide, 1 mg, Intravenous, Daily  castor oil-balsam peru, 1 Application, Topical, Q12H  chlorhexidine, 15 mL, Mouth/Throat, Q12H  famotidine, 20 mg, Intravenous, BID  gabapentin, 300 mg, Nasogastric, Q8H  Linezolid, 600 mg, Intravenous, BID  meropenem, 1,000 mg, Intravenous, Q8H  nystatin, , Topical, Q12H        Vital Sign Min/Max for last 24 hours  Temp  Min: 99.4 °F (37.4 °C)  Max: 100.3 °F (37.9 °C)   BP  Min: 101/45  Max: 190/104   Pulse  Min: 65  Max: 96   Resp  Min: 22  Max: 37   SpO2  Min: 83 %  Max: 100 %   Flow (L/min) (Oxygen Therapy)  Min: 5  Max: 5       Input/Output for last 24 hour shift  01/16 0701 - 01/17 0700  In: 5790.6 [I.V.:2804.6]  Out: 4575 [Urine:3425]   Mode: PS  FiO2 (%):  [45 %] 45 %  S RR:  [24] 24  S VT:  [380 mL] 380 mL  PEEP/CPAP (cm H2O):  [6 cm H20-8 cm H20] 6 cm H20  PA SUP:  [10 cm H20] 10 cm H20  MAP (cm H2O):  [9.4-13] 9.4  Objective:  Vital signs: (most recent): Blood pressure (!) 190/104, pulse 81, temperature 99.6 °F (37.6 °C), temperature source Bladder, resp. rate 22, height 162.6 cm (64.02\"), weight (!) 166 kg (366 lb 3.2 oz), SpO2 96%, not currently breastfeeding.            General Appearance:  Not in distress, no asynchrony with mechanical ventilator.  Head:  Pupils reactive & symmetrical B/L.  Neck:  Endotracheal tube clean.   Lungs:   B/L Breath sounds present with decreased breath sounds on bases, no wheezing " heard, occ crackles.   Heart: S1 and S2 present, no murmur  Abdomen: Obese, soft, bowel sounds hypoactive  Extremities:   + edema, warm to touch.  Neurologic:  Pt awake and following commands.    Ventilator settings: CPAP 40%    Results from last 7 days   Lab Units 01/17/25  0526 01/16/25  0558 01/15/25  0430   WBC 10*3/mm3 5.41 5.17 8.30   HEMOGLOBIN g/dL 7.8* 7.9* 8.1*   PLATELETS 10*3/mm3 138* 141 154     Results from last 7 days   Lab Units 01/17/25  1325 01/17/25  0526 01/16/25  1821 01/16/25  0558   SODIUM mmol/L 135* 137  --  136   POTASSIUM mmol/L 3.5 3.7 3.9 3.4*   CO2 mmol/L 31.0* 29.0  --  24.0   BUN mg/dL 27* 29*  --  31*   CREATININE mg/dL 0.71 0.71  --  0.82   MAGNESIUM mg/dL 2.0 1.4*  --  1.3*   PHOSPHORUS mg/dL 3.8 3.6  --  3.9   GLUCOSE mg/dL 138* 177*  --  241*     Estimated Creatinine Clearance: 168.2 mL/min (by C-G formula based on SCr of 0.71 mg/dL).    Results from last 7 days   Lab Units 01/17/25  1016   PH, ARTERIAL pH units 7.423   PCO2, ARTERIAL mm Hg 41.7   PO2 ART mm Hg 109.0*       Images:   Abdominal x-ray reviewed and Showed tube is possibly within the antrum of stomach.      No new chest x-ray.    Patient's results and images.     Assessment & Plan   Impression        Acute respiratory failure with hypercapnia    MARIAA (acute kidney injury)    Type 2 diabetes mellitus with hyperglycemia    Sepsis       Plan        1.  Patient presented here with respiratory failure currently on mechanical ventilation. Patient found to have MRSA pneumonia.  Started on Zyvox.  1 out of 2 blood culture positive for coag negative staph.  Repeat cultures have been negative likely contaminant.  Continues to have fevers.  Cultures are now growing ESBL Klebsiella.  ID team is following and antibiotics adjusted today.  Continue MRSA pneumonia treatment as well.  2.  Continue heparin drip with history of atrial fibrillation and factor V Leiden with previous thromboembolic disease.  Seems to be tolerating well.   No acute bleed noted.  Hemoglobin low but stable.  Will transition to Eliquis eventually.  3.  Respiratory wise doing better. Weaned off of mechanical ventilation after spontaneous breathing trial.  Will continue to monitor closely.  BiPAP support as needed for respiratory distress..  4.  Renal function improved.  Nephrology team is signed off.  Will go ahead and discontinue hemodialysis catheter.  5.  Patient had multiple large bowel movements today we will go ahead and stop Relistor.   6.  Continue enteral nutrition as tolerated.    7.   continue current insulin regimen.  8.  GI prophylaxis.    Continue supportive care.  Patient remains at high risk of decline.  Check labs in the morning.    Plan of care and goals reviewed with multidisciplinary/antibiotic stewardship team during rounds.   I discussed the patient's findings and my recommendations with family and nursing staff     Time spent Critical care 35 min (exclusive of procedure time)  including high complexity decision making to assess, manipulate, and support vital organ system failure in this individual who has impairment of one or more vital organ systems such that there is a high probability of imminent or life threatening deterioration in the patient’s condition.  Above documentation reviewed and reflect accurate information as of 1/17/2025 including copied elements of the note.       Vince Toscano MD, [facility]  Pulmonary, Critical care and Sleep Medicine

## 2025-01-17 NOTE — PROGRESS NOTES
"  INFECTIOUS DISEASES  INPATIENT PROGRESS NOTE  2025      PATIENT NAME: Natalia Arzate  :  1985  MRN:  [id number]  Date of Admission:  2025      Antimicrobials:  Linezolid - started 25  Cefepime - started 25      MAR reviewed.      Reason for consultation:  fevers    Interval history:  Patient has been extubated.  Having some dyspnea on nasal cannula but oxygenating well.  Low grade temp elevation. BAL culture back with MRSA and ESBL Klebsiella pneumoniae.    ROS:  as noted      Objective:  Temp (24hrs), Av.7 °F (37.6 °C), Min:99.4 °F (37.4 °C), Max:100.3 °F (37.9 °C)    /89   Pulse 81   Temp 99.6 °F (37.6 °C) (Bladder)   Resp 22   Ht 162.6 cm (64.02\")   Wt (!) 166 kg (366 lb 3.2 oz)   LMP  (LMP Unknown) Comment: last menses 6 years ago (HCG negative today)  SpO2 95%   BMI 62.83 kg/m²     Mode: PS  FiO2 (%):  [45 %] 45 %  S RR:  [24] 24  S VT:  [380 mL] 380 mL  PEEP/CPAP (cm H2O):  [6 cm H20-8 cm H20] 6 cm H20  KY SUP:  [10 cm H20-12 cm H20] 10 cm H20  MAP (cm H2O):  [9.4-16] 9.4      Physical Examination:  GENERAL: Acutely on chronically ill-appearing female.  Obese.  Awake, extubated.  LINES: Right IJ temp HD cath.  Left IJ CVL.  HEENT: Normocephalic, atraumatic.  Eyes closed.    CV: RRR. No murmur, rubs, gallops.  Normal S1S2.  LUNGS: Clear to auscultation bilaterally without wheezing, rales, rhonchi. Normal respiratory effort.  Decreased BS B.  ABDOMEN: Positive bowel sounds.  Soft, nontender, nondistended.  No rebound or guarding.  Large pannus.  No rash underneath pannus.  EXT:  No clubbing, cyanosis, or edema.  :  +Urena catheter, draining clear yellow urine.  MSK: No joint effusions or inflammation noted.  SKIN: Warm and dry without rash or ulcer.  NEURO: awake and alert    Laboratory Data:    Results from last 7 days   Lab Units 25  0526 25  0558 01/15/25  0430   WBC 10*3/mm3 5.41 5.17 8.30   HEMOGLOBIN g/dL 7.8* 7.9* 8.1*   HEMATOCRIT % 25.2* " 25.9* 26.1*   PLATELETS 10*3/mm3 138* 141 154     Results from last 7 days   Lab Units 01/17/25  0526   SODIUM mmol/L 137   POTASSIUM mmol/L 3.7   CHLORIDE mmol/L 98   CO2 mmol/L 29.0   BUN mg/dL 29*   CREATININE mg/dL 0.71   GLUCOSE mg/dL 177*   CALCIUM mg/dL 9.7     Results from last 7 days   Lab Units 01/17/25  0526   ALK PHOS U/L 129*   BILIRUBIN mg/dL 0.3   ALT (SGPT) U/L 16   AST (SGOT) U/L 44*               Estimated Creatinine Clearance: 168.2 mL/min (by C-G formula based on SCr of 0.71 mg/dL).  Results from last 7 days   Lab Units 01/11/25  1456   LACTATE mmol/L 0.5         Results from last 7 days   Lab Units 01/12/25  0525 01/11/25  0526 01/10/25  1617   VANCOMYCIN TR mcg/mL  --  22.40*  --    VANCOMYCIN RM mcg/mL 10.20  --  8.70             Microbiology:    Microbiology Results (last 10 days)       Procedure Component Value - Date/Time    Respiratory Culture - Sputum, ET Suction [[id number]]  (Abnormal) Collected: 01/14/25 1857    Lab Status: Final result Specimen: Sputum from ET Suction Updated: 01/17/25 1221     Respiratory Culture Light growth (2+) Klebsiella pneumoniae ESBL     Comment:   Consider infectious disease consult.  Susceptibility results may not correlate to clinical outcomes.         Light growth (2+) Staphylococcus aureus, MRSA     Comment:   Considering site of infection and appropriate dosing, oxacillin (or cefoxitin) results can be applied to cefazolin, nafcillin, ampicillin/sulbactam, dicloxacillin, amoxicillin/clavulanate, cephalexin, and cefpodoxime.  Methicillin resistant Staphylococcus aureus, Patient may be an isolation risk.        Gram Stain Many (4+) WBCs per low power field      Rare (1+) Epithelial cells per low power field      Few (2+) Gram positive cocci in pairs, chains and clusters    Narrative:      Refer to LLL Wash culture for MICS.     Respiratory Culture - Wash, Lung, Left Lower Lobe [[id number]]  (Abnormal)  (Susceptibility) Collected: 01/14/25 1608    Lab Status:  Final result Specimen: Wash from Lung, Left Lower Lobe Updated: 01/17/25 1221     Respiratory Culture Light growth (2+) Klebsiella pneumoniae ESBL     Comment:   Consider infectious disease consult.  Susceptibility results may not correlate to clinical outcomes.         Light growth (2+) Staphylococcus aureus, MRSA     Comment:   Considering site of infection and appropriate dosing, oxacillin (or cefoxitin) results can be applied to cefazolin, nafcillin, ampicillin/sulbactam, dicloxacillin, amoxicillin/clavulanate, cephalexin, and cefpodoxime.  Methicillin resistant Staphylococcus aureus, Patient may be an isolation risk.         Rare growth Normal Respiratory Margo     Gram Stain Many (4+) WBCs per low power field      Rare (1+) Epithelial cells per low power field      Rare (1+) Gram positive cocci in pairs and clusters    Susceptibility        Klebsiella pneumoniae ESBL      KELSIE      Ciprofloxacin Resistant      Ertapenem Susceptible      Levofloxacin Resistant      Meropenem Susceptible      Tetracycline Resistant      Trimethoprim + Sulfamethoxazole Resistant                       Susceptibility        Staphylococcus aureus, MRSA      KELSIE      Clindamycin Resistant      Linezolid Susceptible      Oxacillin Resistant      Tetracycline Susceptible      Trimethoprim + Sulfamethoxazole Susceptible      Vancomycin Susceptible                       Susceptibility Comments       Klebsiella pneumoniae ESBL    With the exception of urinary-sourced infections, aminoglycosides should not be used as monotherapy.      Staphylococcus aureus, MRSA    This isolate is presumed to be clindamycin resistant based on detection of inducible clindamycin resistance.  Clindamycin may still be effective in some patients.  This isolate is presumed to be clindamycin resistant based on detection of inducible clindamycin resistance.  Clindamycin may still be effective in some patients.               Blood Culture - Blood, Blood, Arterial  Line [[id number]]  (Abnormal) Collected: 01/14/25 1434    Lab Status: Final result Specimen: Blood, Arterial Line Updated: 01/17/25 0636     Blood Culture Staphylococcus, coagulase negative     Isolated from Anaerobic Bottle     Gram Stain Anaerobic Bottle Gram positive cocci in pairs and clusters    Narrative:      Probable contaminant requires clinical correlation, susceptibility not performed unless requested by physician.      Blood Culture ID, PCR - Blood, Blood, Arterial Line [[id number]]  (Abnormal) Collected: 01/14/25 1434    Lab Status: Final result Specimen: Blood, Arterial Line Updated: 01/16/25 0657     BCID, PCR Staph spp, not aureus or lugdunensis. Identification by BCID2 PCR.     BOTTLE TYPE Anaerobic Bottle    Blood Culture - Blood, Hand, Right [[id number]]  (Normal) Collected: 01/14/25 1430    Lab Status: Preliminary result Specimen: Blood from Hand, Right Updated: 01/16/25 1500     Blood Culture No growth at 2 days    Narrative:      Less than seven (7) mL's of blood was collected.  Insufficient quantity may yield false negative results.    Blood Culture - Blood, Hand, Left [[id number]]  (Normal) Collected: 01/08/25 1624    Lab Status: Final result Specimen: Blood from Hand, Left Updated: 01/13/25 1731     Blood Culture No growth at 5 days    Blood Culture - Blood, Blood, Arterial Line [[id number]]  (Normal) Collected: 01/08/25 1602    Lab Status: Final result Specimen: Blood, Arterial Line Updated: 01/13/25 1731     Blood Culture No growth at 5 days                Radiology:  XR Chest 1 View    Result Date: 1/16/2025  Impression: 1.New left internal jugular central venous catheter terminates in the lower SVC. 2.Stable left basilar consolidation and pleural effusion. 3.Stable endotracheal tube, gastric suction tube and feeding tube. Electronically Signed: Tod Pickens MD  1/16/2025 5:24 AM EST  Workstation ID: [id number]         DISCUSSION:  39 y.o. female with history of factor V Leiden, obesity, and  uncontrolled type 2 diabetes admitted with hyperglycemia.   Extended intubation with 14-day hospitalization, now with fever.     PROBLEM LIST:   -- Fever, hospital onset - improving on linezolid/cefepime.  With prolonged ICU stay at 2 weeks with new fever, suspect nosocomial infection.  Consider central line associated bloodstream infection, catheter associated urinary tract infection, ventilator associated pneumonia.  UA not collected.  Blood cultures with one bottle CoNS, likely contaminant.  Prior CVL removed.  BAL culture MRSA and ESBL Klebsiella pneumoniae, c/w HAP/VAP.  -- Acute hypoxic respiratory failure, intubated since 1/1/2025.    -- Bibasilar pulmonary infiltrates in the setting of fever and extended intubation, risk for ventilator associated pneumonia.  -- Indwelling Urena catheter, risk for catheter associated UTI.  -- Right IJ central line in place from outside ER, risk for CLABSI.  -- MRSA pneumonia, previously treated with vancomycin for 5 days, currently with linezolid.  -- Positive blood cultures.  Blood cultures x 2 from 1/1/2025 with 1 bottle positive for Gemella sanguinis and 1 bottle and the other set positive for coagulase-negative Staphylococcus.  CoNS likely contaminant.  Unclear significance for Gemella species - was treated with 10 days aminopenicillin (ampicillin followed by Unasyn).  Repeat blood cultures were no growth.  -- Obesity.  Skin appears intact at the fold without evidence for breakdown or infection.  -- Uncontrolled type 2 diabetes, contributing to propensity for infection and poor healing.  -- Factor V Leiden.  -- Listed penicillin allergy but tolerated ampicillin.    PLAN:  -- Continue linezolid for MRSA, complete 7 days  -- Change cefepime to meropenem to optimize therapy for ESBL producing Klebsiella pneumoniae isolate, x 5 days  -- Monitor vitals, exam, and labs  -- Monitor for adverse reactions to antimicrobials    Complex case.  Discussed with patient.    I will see  the patient again on Monday.  Please call my colleague on-call if issues in the interim.      Rj Boyd MD  1/17/2025  12:27 EST

## 2025-01-18 ENCOUNTER — APPOINTMENT (OUTPATIENT)
Dept: GENERAL RADIOLOGY | Facility: HOSPITAL | Age: 40
DRG: 870 | End: 2025-01-18
Payer: COMMERCIAL

## 2025-01-18 LAB
ALBUMIN SERPL-MCNC: 3.4 G/DL (ref 3.5–5.2)
ALBUMIN/GLOB SERPL: 0.9 G/DL
ALP SERPL-CCNC: 124 U/L (ref 39–117)
ALT SERPL W P-5'-P-CCNC: 19 U/L (ref 1–33)
ANION GAP SERPL CALCULATED.3IONS-SCNC: 11 MMOL/L (ref 5–15)
AST SERPL-CCNC: 53 U/L (ref 1–32)
BASOPHILS # BLD AUTO: 0.03 10*3/MM3 (ref 0–0.2)
BASOPHILS NFR BLD AUTO: 0.6 % (ref 0–1.5)
BILIRUB SERPL-MCNC: 0.3 MG/DL (ref 0–1.2)
BUN SERPL-MCNC: 25 MG/DL (ref 6–20)
BUN/CREAT SERPL: 37.9 (ref 7–25)
CALCIUM SPEC-SCNC: 9.7 MG/DL (ref 8.6–10.5)
CHLORIDE SERPL-SCNC: 97 MMOL/L (ref 98–107)
CO2 SERPL-SCNC: 30 MMOL/L (ref 22–29)
CREAT SERPL-MCNC: 0.66 MG/DL (ref 0.57–1)
DEPRECATED RDW RBC AUTO: 57.7 FL (ref 37–54)
EGFRCR SERPLBLD CKD-EPI 2021: 115.3 ML/MIN/1.73
EOSINOPHIL # BLD AUTO: 0.17 10*3/MM3 (ref 0–0.4)
EOSINOPHIL NFR BLD AUTO: 3.5 % (ref 0.3–6.2)
ERYTHROCYTE [DISTWIDTH] IN BLOOD BY AUTOMATED COUNT: 17.2 % (ref 12.3–15.4)
GLOBULIN UR ELPH-MCNC: 3.9 GM/DL
GLUCOSE BLDC GLUCOMTR-MCNC: 130 MG/DL (ref 70–130)
GLUCOSE BLDC GLUCOMTR-MCNC: 137 MG/DL (ref 70–130)
GLUCOSE BLDC GLUCOMTR-MCNC: 141 MG/DL (ref 70–130)
GLUCOSE BLDC GLUCOMTR-MCNC: 144 MG/DL (ref 70–130)
GLUCOSE BLDC GLUCOMTR-MCNC: 145 MG/DL (ref 70–130)
GLUCOSE BLDC GLUCOMTR-MCNC: 145 MG/DL (ref 70–130)
GLUCOSE BLDC GLUCOMTR-MCNC: 147 MG/DL (ref 70–130)
GLUCOSE BLDC GLUCOMTR-MCNC: 157 MG/DL (ref 70–130)
GLUCOSE BLDC GLUCOMTR-MCNC: 158 MG/DL (ref 70–130)
GLUCOSE BLDC GLUCOMTR-MCNC: 159 MG/DL (ref 70–130)
GLUCOSE BLDC GLUCOMTR-MCNC: 160 MG/DL (ref 70–130)
GLUCOSE BLDC GLUCOMTR-MCNC: 170 MG/DL (ref 70–130)
GLUCOSE BLDC GLUCOMTR-MCNC: 179 MG/DL (ref 70–130)
GLUCOSE SERPL-MCNC: 150 MG/DL (ref 65–99)
HCT VFR BLD AUTO: 26.5 % (ref 34–46.6)
HGB BLD-MCNC: 8.2 G/DL (ref 12–15.9)
IMM GRANULOCYTES # BLD AUTO: 0.04 10*3/MM3 (ref 0–0.05)
IMM GRANULOCYTES NFR BLD AUTO: 0.8 % (ref 0–0.5)
LYMPHOCYTES # BLD AUTO: 0.82 10*3/MM3 (ref 0.7–3.1)
LYMPHOCYTES NFR BLD AUTO: 16.9 % (ref 19.6–45.3)
MAGNESIUM SERPL-MCNC: 1.7 MG/DL (ref 1.6–2.6)
MCH RBC QN AUTO: 29.3 PG (ref 26.6–33)
MCHC RBC AUTO-ENTMCNC: 30.9 G/DL (ref 31.5–35.7)
MCV RBC AUTO: 94.6 FL (ref 79–97)
MONOCYTES # BLD AUTO: 0.42 10*3/MM3 (ref 0.1–0.9)
MONOCYTES NFR BLD AUTO: 8.7 % (ref 5–12)
NEUTROPHILS NFR BLD AUTO: 3.36 10*3/MM3 (ref 1.7–7)
NEUTROPHILS NFR BLD AUTO: 69.5 % (ref 42.7–76)
NRBC BLD AUTO-RTO: 0 /100 WBC (ref 0–0.2)
PHOSPHATE SERPL-MCNC: 4.3 MG/DL (ref 2.5–4.5)
PLATELET # BLD AUTO: 144 10*3/MM3 (ref 140–450)
PMV BLD AUTO: 9.2 FL (ref 6–12)
POTASSIUM SERPL-SCNC: 3.8 MMOL/L (ref 3.5–5.2)
POTASSIUM SERPL-SCNC: 3.8 MMOL/L (ref 3.5–5.2)
PROT SERPL-MCNC: 7.3 G/DL (ref 6–8.5)
RBC # BLD AUTO: 2.8 10*6/MM3 (ref 3.77–5.28)
SODIUM SERPL-SCNC: 138 MMOL/L (ref 136–145)
UFH PPP CHRO-ACNC: 0.46 IU/ML (ref 0.3–0.7)
WBC NRBC COR # BLD AUTO: 4.84 10*3/MM3 (ref 3.4–10.8)

## 2025-01-18 PROCEDURE — 25010000002 MEROPENEM PER 100 MG: Performed by: INTERNAL MEDICINE

## 2025-01-18 PROCEDURE — 85520 HEPARIN ASSAY: CPT

## 2025-01-18 PROCEDURE — 84100 ASSAY OF PHOSPHORUS: CPT | Performed by: INTERNAL MEDICINE

## 2025-01-18 PROCEDURE — 74018 RADEX ABDOMEN 1 VIEW: CPT

## 2025-01-18 PROCEDURE — 83735 ASSAY OF MAGNESIUM: CPT | Performed by: INTERNAL MEDICINE

## 2025-01-18 PROCEDURE — 25010000002 LINEZOLID 600 MG/300ML SOLUTION: Performed by: INTERNAL MEDICINE

## 2025-01-18 PROCEDURE — 25010000002 ONDANSETRON PER 1 MG

## 2025-01-18 PROCEDURE — 25010000002 HEPARIN (PORCINE) 25000-0.45 UT/250ML-% SOLUTION

## 2025-01-18 PROCEDURE — 25010000002 PROCHLORPERAZINE 10 MG/2ML SOLUTION

## 2025-01-18 PROCEDURE — 25810000003 SODIUM CHLORIDE 0.9 % SOLUTION 250 ML FLEX CONT: Performed by: INTERNAL MEDICINE

## 2025-01-18 PROCEDURE — 84132 ASSAY OF SERUM POTASSIUM: CPT | Performed by: INTERNAL MEDICINE

## 2025-01-18 PROCEDURE — 99291 CRITICAL CARE FIRST HOUR: CPT | Performed by: INTERNAL MEDICINE

## 2025-01-18 PROCEDURE — 82948 REAGENT STRIP/BLOOD GLUCOSE: CPT

## 2025-01-18 PROCEDURE — 25010000002 BUMETANIDE PER 0.5 MG: Performed by: INTERNAL MEDICINE

## 2025-01-18 PROCEDURE — 85025 COMPLETE CBC W/AUTO DIFF WBC: CPT | Performed by: INTERNAL MEDICINE

## 2025-01-18 PROCEDURE — 80053 COMPREHEN METABOLIC PANEL: CPT | Performed by: INTERNAL MEDICINE

## 2025-01-18 RX ADMIN — INSULIN HUMAN 6.6 UNITS/HR: 1 INJECTION, SOLUTION INTRAVENOUS at 19:01

## 2025-01-18 RX ADMIN — BUMETANIDE 1 MG: 0.25 INJECTION INTRAMUSCULAR; INTRAVENOUS at 08:31

## 2025-01-18 RX ADMIN — HYDROCODONE BITARTRATE AND ACETAMINOPHEN 1 TABLET: 5; 325 TABLET ORAL at 17:08

## 2025-01-18 RX ADMIN — ONDANSETRON 4 MG: 2 INJECTION INTRAMUSCULAR; INTRAVENOUS at 01:49

## 2025-01-18 RX ADMIN — MEROPENEM 1000 MG: 1 INJECTION, POWDER, FOR SOLUTION INTRAVENOUS at 20:01

## 2025-01-18 RX ADMIN — HYDROCODONE BITARTRATE AND ACETAMINOPHEN 1 TABLET: 5; 325 TABLET ORAL at 23:08

## 2025-01-18 RX ADMIN — INSULIN HUMAN 10.2 UNITS/HR: 1 INJECTION, SOLUTION INTRAVENOUS at 06:22

## 2025-01-18 RX ADMIN — ONDANSETRON 4 MG: 2 INJECTION INTRAMUSCULAR; INTRAVENOUS at 08:20

## 2025-01-18 RX ADMIN — HYDROCODONE BITARTRATE AND ACETAMINOPHEN 1 TABLET: 5; 325 TABLET ORAL at 10:49

## 2025-01-18 RX ADMIN — PROCHLORPERAZINE EDISYLATE 5 MG: 5 INJECTION INTRAMUSCULAR; INTRAVENOUS at 16:34

## 2025-01-18 RX ADMIN — PROCHLORPERAZINE EDISYLATE 5 MG: 5 INJECTION INTRAMUSCULAR; INTRAVENOUS at 09:55

## 2025-01-18 RX ADMIN — DEXMEDETOMIDINE 0.6 MCG/KG/HR: 200 INJECTION, SOLUTION INTRAVENOUS at 10:49

## 2025-01-18 RX ADMIN — FAMOTIDINE 20 MG: 10 INJECTION, SOLUTION INTRAVENOUS at 08:31

## 2025-01-18 RX ADMIN — HEPARIN SODIUM 25 UNITS/KG/HR: 10000 INJECTION, SOLUTION INTRAVENOUS at 13:07

## 2025-01-18 RX ADMIN — Medication 1 APPLICATION: at 20:02

## 2025-01-18 RX ADMIN — LINEZOLID 600 MG: 600 INJECTION, SOLUTION INTRAVENOUS at 08:31

## 2025-01-18 RX ADMIN — CHLORHEXIDINE GLUCONATE 0.12% ORAL RINSE 15 ML: 1.2 LIQUID ORAL at 08:31

## 2025-01-18 RX ADMIN — GABAPENTIN 300 MG: 300 CAPSULE ORAL at 21:51

## 2025-01-18 RX ADMIN — NYSTATIN: 100000 POWDER TOPICAL at 20:01

## 2025-01-18 RX ADMIN — HYDROCODONE BITARTRATE AND ACETAMINOPHEN 1 TABLET: 5; 325 TABLET ORAL at 05:03

## 2025-01-18 RX ADMIN — GABAPENTIN 300 MG: 300 CAPSULE ORAL at 13:07

## 2025-01-18 RX ADMIN — HEPARIN SODIUM 25 UNITS/KG/HR: 10000 INJECTION, SOLUTION INTRAVENOUS at 19:01

## 2025-01-18 RX ADMIN — GABAPENTIN 300 MG: 300 CAPSULE ORAL at 05:06

## 2025-01-18 RX ADMIN — PROCHLORPERAZINE EDISYLATE 5 MG: 5 INJECTION INTRAMUSCULAR; INTRAVENOUS at 22:33

## 2025-01-18 RX ADMIN — LINEZOLID 600 MG: 600 INJECTION, SOLUTION INTRAVENOUS at 20:01

## 2025-01-18 RX ADMIN — NYSTATIN: 100000 POWDER TOPICAL at 08:31

## 2025-01-18 RX ADMIN — ONDANSETRON 4 MG: 2 INJECTION INTRAMUSCULAR; INTRAVENOUS at 20:01

## 2025-01-18 RX ADMIN — MEROPENEM 1000 MG: 1 INJECTION, POWDER, FOR SOLUTION INTRAVENOUS at 04:29

## 2025-01-18 RX ADMIN — PROCHLORPERAZINE EDISYLATE 5 MG: 5 INJECTION INTRAMUSCULAR; INTRAVENOUS at 03:38

## 2025-01-18 RX ADMIN — HEPARIN SODIUM 25 UNITS/KG/HR: 10000 INJECTION, SOLUTION INTRAVENOUS at 06:23

## 2025-01-18 RX ADMIN — MEROPENEM 1000 MG: 1 INJECTION, POWDER, FOR SOLUTION INTRAVENOUS at 12:09

## 2025-01-18 RX ADMIN — ONDANSETRON 4 MG: 2 INJECTION INTRAMUSCULAR; INTRAVENOUS at 14:20

## 2025-01-18 RX ADMIN — Medication 1 APPLICATION: at 08:31

## 2025-01-18 NOTE — PLAN OF CARE
Problem: Adult Inpatient Plan of Care  Goal: Plan of Care Review  Outcome: Progressing  Flowsheets (Taken 1/18/2025 0557)  Progress: improving  Outcome Evaluation: Patient rested well tonight on precidex at 0.6. Patient remains on 2L NC and oxygen remains >92%. Patient remains afebrile tonight. Patient's voice still remains hoarse, however patient is understandable and her cough is very strong. Patient continues to complain of nausea and patient had one small episode of emesis tonight. Compazine and Zofran given x2. KUB completed, Keofeed no longer post-pyloric. RN attempted to replace twice, both times tube remained in stomach. See KUBs. TF currently on hold, ok to use Keofeed for meds only. Patient continues to complain of intermittent pain, PO pain medication given x2. RN removed dialysis catheter tonight, heparin gtt restarted and patient tolerating well. No bleeding noted. Continue to monitor closely, continue with current plan of care.  Plan of Care Reviewed With: patient   Goal Outcome Evaluation:

## 2025-01-18 NOTE — NURSING NOTE
Prior to central line removal, order for the removal of catheter was verified, patient was assessed, necessary materials were gathered and patient was educated regarding procedure .    Patient was positioned supine, flat, and Trendelenburg to ensure that the insertion site was at or below the level of the heart.    Hands were washed, clean gloves were applied and central line dressing was gently removed. Catheter exit site was not cultured.     A new pair of clean gloves were then applied. Insertion site was cleansed with 2% Chlorhexidine swab using a circular motion beginning at the insertion site and moving outward for 30 seconds and allowed to dry.     Clamp on line was present and clamped.     Patient was instructed to perform Valsalva maneuver as catheter was withdrawn.     The central line was grasped at the insertion site and slowly pulled outward parallel to the skin. Resistance was not met.    After central line was completely removed, a sterile 4x4 gauze pad was used to apply light pressure until bleeding stopped. At that time, petroleum-based gauze and a sterile occlusive dressing was applied to exit site.     Patient was instructed to keep dressing in place for at least 24 hours and to remain in a flat or reclining position for 30 minutes post-removal.     Catheter was inspected after removal and was intact. Tip of central line was not sent for culture. Patient tolerated procedure.      RN also spoke to Vanessa Pharmacist prior to removal. Heparin gtt on hold for 3 hours prior to removal see MAR. Patient's BP and HR remained stable during procedure. No bleeding noted following procedure. Pharmacist notified and heparin gtt restarted SEE MAR

## 2025-01-18 NOTE — PLAN OF CARE
Goal Outcome Evaluation:    -patient alert. Oriented x4 but does get confused at times. Easily reoriented.  -currently on 0.5 L, did not tolerate room air at the moment  -TF restarted at 20 mL/hr, not advancing. No issues noted.  -PRN zofran and compazine given around the clock for nausea. Patient reports improvement in symptoms, but dry heaving twice. X1 emesis in evening, bile in appearance. PRN norco given around the clock for generalized abdomen and back pain, Requests pain medication frequently before able to be given.  -precedex being weaned. Currently at 0.4 mcg.  -remains on insulin drip, managed via glucommander; remains on heparin drip  -adequate urine output  -SR with first degree AV block on monitor

## 2025-01-18 NOTE — PROGRESS NOTES
"INTENSIVIST   PROGRESS NOTE     Hospital:  LOS: 16 days     Chief Complaint: Respiratory failure     Subjective   S     Interval History: No major issues overnight. Hemodynamically stable, afebrile at time of assessment this morning. On supplemental oxygen via nasal cannula with good saturations. Fully alert and oriented. Fatigued but wakes to answer questions.    The patient's relevant past medical, surgical and social history were reviewed and updated in Epic as appropriate.      ROS: Reviewed, negative.    Objective   O     Intake/Ouptut 24 hrs (7:00AM - 6:59 AM)  Intake & Output (last 3 days)         01/15 0701  01/16 0700 01/16 0701 01/17 0700 01/17 0701 01/18 0700 01/18 0701 01/19 0700    I.V. (mL/kg) 3575.2 (21.7) 2804.6 (16.9) 2008.1 (12.1) 386 (2.3)    Other 461 450 353     NG/GT 1433 1536 976     IV Piggyback 1125 1000 1000     Total Intake(mL/kg) 6594.2 (40) 5790.6 (34.9) 4337.1 (26.1) 386 (2.3)    Urine (mL/kg/hr) 2650 (0.7) 3425 (0.9) 3430 (0.9) 300 (0.4)    Emesis/NG output 1200 1150 0     Stool 1 0 0 0    Total Output 3851 4575 3430 300    Net +2743.2 +1215.6 +907.1 +86            Stool Unmeasured Occurrence  4 x 2 x 1 x    Emesis Unmeasured Occurrence   1 x      Medications (drips):  dexmedetomidine HCl (PRECEDEX) 1,000 mcg in sodium chloride 0.9 % 250 mL infusion, Last Rate: 0.6 mcg/kg/hr (01/18/25 1049)  heparin, Last Rate: 25 Units/kg/hr (01/18/25 0623)  insulin, Last Rate: 9.2 Units/hr (01/18/25 1054)  Pharmacy to Dose Heparin    Respiratory Support: Nasal cannula    Physical Examination:  Vital Signs: Blood pressure 121/69, pulse 68, temperature 98.3 °F (36.8 °C), temperature source Bladder, resp. rate 24, height 162.6 cm (64.02\"), weight (!) 166 kg (366 lb 3.2 oz), SpO2 95%, not currently breastfeeding.    General: The patient appears in no acute distress. Alert, cooperative and interactive.  Chest: Distant breath sounds.  No wheezing or rhonchi appreciated.  Appropriate oxygen saturations " on nasal cannula.  Breathing appears nonlabored.  Cardiac: Normal rate.  S1S2 auscultated. No murmurs, rubs or gallops.   Abdomen: Soft, non-tender, non-distended, positive bowel sounds in all four quadrants.   Extremities: 1+ lower extremity edema. No clubbing or cyanosis.  Neuro: Motor power grossly intact bilaterally. Sensation intact. Speech fluid and fluent. Thought process coherent.  Psych: Alert and oriented x3. Mood stable.    Lines, Drains & Airways       Active LDAs       Name Placement date Placement time Site Days    CVC Triple Lumen 01/16/25 Left Internal jugular 01/16/25  0500  Internal jugular  2    NG/OG Tube Nasoduodenal 10 Fr Left nostril 01/03/25  1100  Left nostril  15    Urethral Catheter Temperature probe 01/13/25  1155  -- 4             Results from last 7 days   Lab Units 01/18/25  0431 01/17/25  0526 01/16/25  0558   WBC 10*3/mm3 4.84 5.41 5.17   HEMOGLOBIN g/dL 8.2* 7.8* 7.9*   MCV fL 94.6 94.4 95.2   PLATELETS 10*3/mm3 144 138* 141     Results from last 7 days   Lab Units 01/18/25  0431 01/17/25  1325 01/17/25  0526   SODIUM mmol/L 138 135* 137   POTASSIUM mmol/L 3.8  3.8 3.5 3.7   CO2 mmol/L 30.0* 31.0* 29.0   CREATININE mg/dL 0.66 0.71 0.71   GLUCOSE mg/dL 150* 138* 177*   MAGNESIUM mg/dL 1.7 2.0 1.4*   PHOSPHORUS mg/dL 4.3 3.8 3.6     Estimated Creatinine Clearance: 181 mL/min (by C-G formula based on SCr of 0.66 mg/dL).  Results from last 7 days   Lab Units 01/18/25  0431 01/17/25  1325 01/17/25  0526   ALK PHOS U/L 124* 136* 129*   BILIRUBIN mg/dL 0.3 0.3 0.3   ALT (SGPT) U/L 19 16 16   AST (SGOT) U/L 53* 44* 44*     Results from last 7 days   Lab Units 01/17/25  1016 01/16/25  0543 01/14/25  0323 01/13/25  0420 01/12/25  0330   PH, ARTERIAL pH units 7.423 7.336* 7.469* 7.362 7.330*   PCO2, ARTERIAL mm Hg 41.7 45.9* 44.2 42.6 36.5   PO2 ART mm Hg 109.0* 92.9 71.5* 131.0* 95.6   FIO2 % 45 65 40 45 35     Images:  XR Abdomen KUB    Result Date: 1/18/2025  Impression: Enteric tube  within the stomach. Electronically Signed: Cristal Matt MD  1/18/2025 4:08 AM EST  Workstation ID: [id number]    XR Abdomen KUB    Result Date: 1/18/2025  Impression: Enteric tube within the stomach. Electronically Signed: Cristal Matt MD  1/18/2025 2:56 AM EST  Workstation ID: [id number]     Results: Reviewed.  - I reviewed the patient's new laboratory and imaging results.  - I independently reviewed the patient's new images.    Medications: Reviewed.    Assessment & Plan    A / P     Active Hospital Problems:  Active Hospital Problems    Diagnosis  POA    **Acute respiratory failure with hypercapnia [J96.02]  Unknown    Sepsis [A41.9]  Unknown    Type 2 diabetes mellitus with hyperglycemia [E11.65]  Yes    MARIAA (acute kidney injury) [N17.9]  Yes      Resolved Hospital Problems   No resolved problems to display.     Natalia Arzate is a 39F with a history of HLD, Hypothyroidism, Afib, PE/DVT, Factor V Leiden mutation, and stroke who presented to [facility] with altered mental status. Labs there were significant for renal failure, hyperglycemia, and hypercapnic respiratory failure. Imaging consistent with basilar pulmonary infiltrates concerning for pneumonia. She was intubated and required the initiation of vasopressors. She was transferred to [facility] on 1/2/25 for ongoing care     On arrival, she required ongoing mechanical ventilation and vasopressor support. She has been started on Vancomycin, Flagyl and Cefepime. Cultures are significant for MRSA in the sputum along with Gemella sanguinis in the blood      Nephrology has been following for MARIAA with poor urine output and hyperkalemia          On the morning of 1/4/25, she developed worsening hypoxia despite an FiO2 of 100% and PEEP of 16. Imaging showed white out of the left lung. Bronchoscopy was performed with mucopurulent secretions suctioned from the left upper and left lower lobes. Repeat CXR after bronchoscopy showed some improvement in left upper lobe aeration with  persistent left lower lobe disease vs pleural effusion     Patient has worsening renal failure and started on CRRT which eventually was transitioned to intermittent hemodialysis     Due to persistent fevers bronchoscopy was done on 1/14 with large amount of secretions suctioned out from left lung field.  Central line was also discontinued and PICC line placed.  Arterial line discontinued 1/15. Patient  PICC line was accidentally dislodged.  New left IJ triple-lumen catheter was placed 1/16     - Patient presented here with respiratory failure on mechanical ventilation. Found to have MRSA pneumonia and started on Zyvox. 1 out of 2 blood culture positive for coag negative staph. Repeat cultures have been negative so likely contaminant. She continued to have intermittent fevers and repeat CTX (+) ESBL Klebsiella. ID team is following and managing ABX  - Continue heparin drip with history of atrial fibrillation and factor V Leiden with previous thromboembolic disease. Seems to be tolerating well.  No acute bleed noted. Hemoglobin low but stable.  Will need to transition to Eliquis eventually  - Respiratory wise doing better. Extubated on 1/17 and currently on NC. Diuresing as tolerated. Will continue to monitor closely. BiPAP support as needed for respiratory distress  - Renal function improved.  Nephrology team is signed off. Pulled hemodialysis catheter   - Patient had multiple large bowel movements; Stopped Relistor   - Continue enteral nutrition as tolerated. Feeding tube replaced/repositioned but still coiled in stomach. TF had been on hold but will restart trophic on 1/18   - Continue current insulin regimen   - Precedex for anxiety but weaning aggressively; Hope to DC in the next 12-24 hours   - GI prophylaxis     Advance Directives:   Code Status and Medical Interventions: CPR (Attempt to Resuscitate); Full Support   Ordered at: 01/02/25 1952     Code Status (Patient has no pulse and is not breathing):    CPR  (Attempt to Resuscitate)     Medical Interventions (Patient has pulse or is breathing):    Full Support     High level of risk due to severe exacerbation of chronic illness and illness with threat to life or bodily function.    I conducted multidisciplinary rounds in the plan of care was discussed with the multidisciplinary team at that time. In attendance at multidisciplinary rounds was clinical pharmacist, dietitian, nursing staff and case management.    I discussed the patient's findings and my recommendations with patient, family, and nursing staff.     Critical Care Time: Critical Care time spent in direct patient care: 30 minutes (excluding procedure time, if applicable) including high complexity decision making to assess, manipulate, and support vital organ system failure in this individual who has impairment of one or more vital organ systems such that there is a high probability of imminent or life threatening deterioration in the patient’s condition.     -- Marquis Rees MD  Pulmonary/Critical Care

## 2025-01-19 ENCOUNTER — APPOINTMENT (OUTPATIENT)
Dept: GENERAL RADIOLOGY | Facility: HOSPITAL | Age: 40
DRG: 870 | End: 2025-01-19
Payer: COMMERCIAL

## 2025-01-19 LAB
ANION GAP SERPL CALCULATED.3IONS-SCNC: 11 MMOL/L (ref 5–15)
BACTERIA SPEC AEROBE CULT: NORMAL
BASOPHILS # BLD AUTO: 0.03 10*3/MM3 (ref 0–0.2)
BASOPHILS NFR BLD AUTO: 0.6 % (ref 0–1.5)
BUN SERPL-MCNC: 21 MG/DL (ref 6–20)
BUN/CREAT SERPL: 31.8 (ref 7–25)
CALCIUM SPEC-SCNC: 9.6 MG/DL (ref 8.6–10.5)
CHLORIDE SERPL-SCNC: 93 MMOL/L (ref 98–107)
CO2 SERPL-SCNC: 30 MMOL/L (ref 22–29)
CREAT SERPL-MCNC: 0.66 MG/DL (ref 0.57–1)
DEPRECATED RDW RBC AUTO: 59.5 FL (ref 37–54)
EGFRCR SERPLBLD CKD-EPI 2021: 115.3 ML/MIN/1.73
EOSINOPHIL # BLD AUTO: 0.14 10*3/MM3 (ref 0–0.4)
EOSINOPHIL NFR BLD AUTO: 2.7 % (ref 0.3–6.2)
ERYTHROCYTE [DISTWIDTH] IN BLOOD BY AUTOMATED COUNT: 17.2 % (ref 12.3–15.4)
GLUCOSE BLDC GLUCOMTR-MCNC: 138 MG/DL (ref 70–130)
GLUCOSE BLDC GLUCOMTR-MCNC: 142 MG/DL (ref 70–130)
GLUCOSE BLDC GLUCOMTR-MCNC: 145 MG/DL (ref 70–130)
GLUCOSE BLDC GLUCOMTR-MCNC: 153 MG/DL (ref 70–130)
GLUCOSE BLDC GLUCOMTR-MCNC: 153 MG/DL (ref 70–130)
GLUCOSE BLDC GLUCOMTR-MCNC: 154 MG/DL (ref 70–130)
GLUCOSE BLDC GLUCOMTR-MCNC: 155 MG/DL (ref 70–130)
GLUCOSE BLDC GLUCOMTR-MCNC: 158 MG/DL (ref 70–130)
GLUCOSE BLDC GLUCOMTR-MCNC: 161 MG/DL (ref 70–130)
GLUCOSE BLDC GLUCOMTR-MCNC: 163 MG/DL (ref 70–130)
GLUCOSE BLDC GLUCOMTR-MCNC: 163 MG/DL (ref 70–130)
GLUCOSE BLDC GLUCOMTR-MCNC: 165 MG/DL (ref 70–130)
GLUCOSE BLDC GLUCOMTR-MCNC: 166 MG/DL (ref 70–130)
GLUCOSE BLDC GLUCOMTR-MCNC: 167 MG/DL (ref 70–130)
GLUCOSE BLDC GLUCOMTR-MCNC: 172 MG/DL (ref 70–130)
GLUCOSE BLDC GLUCOMTR-MCNC: 179 MG/DL (ref 70–130)
GLUCOSE BLDC GLUCOMTR-MCNC: 187 MG/DL (ref 70–130)
GLUCOSE BLDC GLUCOMTR-MCNC: 196 MG/DL (ref 70–130)
GLUCOSE SERPL-MCNC: 164 MG/DL (ref 65–99)
HCT VFR BLD AUTO: 26.7 % (ref 34–46.6)
HGB BLD-MCNC: 8.1 G/DL (ref 12–15.9)
IMM GRANULOCYTES # BLD AUTO: 0.07 10*3/MM3 (ref 0–0.05)
IMM GRANULOCYTES NFR BLD AUTO: 1.4 % (ref 0–0.5)
LYMPHOCYTES # BLD AUTO: 1.03 10*3/MM3 (ref 0.7–3.1)
LYMPHOCYTES NFR BLD AUTO: 20 % (ref 19.6–45.3)
MAGNESIUM SERPL-MCNC: 1.5 MG/DL (ref 1.6–2.6)
MCH RBC QN AUTO: 29.2 PG (ref 26.6–33)
MCHC RBC AUTO-ENTMCNC: 30.3 G/DL (ref 31.5–35.7)
MCV RBC AUTO: 96.4 FL (ref 79–97)
MONOCYTES # BLD AUTO: 0.54 10*3/MM3 (ref 0.1–0.9)
MONOCYTES NFR BLD AUTO: 10.5 % (ref 5–12)
NEUTROPHILS NFR BLD AUTO: 3.34 10*3/MM3 (ref 1.7–7)
NEUTROPHILS NFR BLD AUTO: 64.8 % (ref 42.7–76)
NRBC BLD AUTO-RTO: 0 /100 WBC (ref 0–0.2)
PLATELET # BLD AUTO: 184 10*3/MM3 (ref 140–450)
PMV BLD AUTO: 10.1 FL (ref 6–12)
POTASSIUM SERPL-SCNC: 3.9 MMOL/L (ref 3.5–5.2)
RBC # BLD AUTO: 2.77 10*6/MM3 (ref 3.77–5.28)
SODIUM SERPL-SCNC: 134 MMOL/L (ref 136–145)
UFH PPP CHRO-ACNC: 0.1 IU/ML (ref 0.3–0.7)
UFH PPP CHRO-ACNC: 0.61 IU/ML (ref 0.3–0.7)
UFH PPP CHRO-ACNC: 0.65 IU/ML (ref 0.3–0.7)
WBC NRBC COR # BLD AUTO: 5.15 10*3/MM3 (ref 3.4–10.8)

## 2025-01-19 PROCEDURE — 97535 SELF CARE MNGMENT TRAINING: CPT

## 2025-01-19 PROCEDURE — 25010000002 BUMETANIDE PER 0.5 MG: Performed by: INTERNAL MEDICINE

## 2025-01-19 PROCEDURE — 25010000002 LINEZOLID 600 MG/300ML SOLUTION: Performed by: INTERNAL MEDICINE

## 2025-01-19 PROCEDURE — 71045 X-RAY EXAM CHEST 1 VIEW: CPT

## 2025-01-19 PROCEDURE — 97164 PT RE-EVAL EST PLAN CARE: CPT

## 2025-01-19 PROCEDURE — 25010000002 MAGNESIUM SULFATE 2 GM/50ML SOLUTION: Performed by: INTERNAL MEDICINE

## 2025-01-19 PROCEDURE — 25010000002 ONDANSETRON PER 1 MG

## 2025-01-19 PROCEDURE — 85520 HEPARIN ASSAY: CPT

## 2025-01-19 PROCEDURE — 85025 COMPLETE CBC W/AUTO DIFF WBC: CPT | Performed by: INTERNAL MEDICINE

## 2025-01-19 PROCEDURE — 83735 ASSAY OF MAGNESIUM: CPT | Performed by: INTERNAL MEDICINE

## 2025-01-19 PROCEDURE — 97530 THERAPEUTIC ACTIVITIES: CPT

## 2025-01-19 PROCEDURE — 25010000002 HEPARIN (PORCINE) 25000-0.45 UT/250ML-% SOLUTION

## 2025-01-19 PROCEDURE — 97166 OT EVAL MOD COMPLEX 45 MIN: CPT

## 2025-01-19 PROCEDURE — 25010000002 MEROPENEM PER 100 MG: Performed by: INTERNAL MEDICINE

## 2025-01-19 PROCEDURE — 80048 BASIC METABOLIC PNL TOTAL CA: CPT | Performed by: INTERNAL MEDICINE

## 2025-01-19 PROCEDURE — 25810000003 SODIUM CHLORIDE 0.9 % SOLUTION 250 ML FLEX CONT: Performed by: INTERNAL MEDICINE

## 2025-01-19 PROCEDURE — 99233 SBSQ HOSP IP/OBS HIGH 50: CPT | Performed by: INTERNAL MEDICINE

## 2025-01-19 PROCEDURE — 25010000002 PROCHLORPERAZINE 10 MG/2ML SOLUTION

## 2025-01-19 PROCEDURE — 82948 REAGENT STRIP/BLOOD GLUCOSE: CPT

## 2025-01-19 RX ORDER — MAGNESIUM SULFATE HEPTAHYDRATE 40 MG/ML
2 INJECTION, SOLUTION INTRAVENOUS
Status: COMPLETED | OUTPATIENT
Start: 2025-01-19 | End: 2025-01-19

## 2025-01-19 RX ORDER — BUMETANIDE 0.25 MG/ML
1 INJECTION, SOLUTION INTRAMUSCULAR; INTRAVENOUS ONCE
Status: COMPLETED | OUTPATIENT
Start: 2025-01-19 | End: 2025-01-19

## 2025-01-19 RX ADMIN — NYSTATIN: 100000 POWDER TOPICAL at 20:27

## 2025-01-19 RX ADMIN — LINEZOLID 600 MG: 600 INJECTION, SOLUTION INTRAVENOUS at 20:26

## 2025-01-19 RX ADMIN — MEROPENEM 1000 MG: 1 INJECTION, POWDER, FOR SOLUTION INTRAVENOUS at 11:31

## 2025-01-19 RX ADMIN — HEPARIN SODIUM 25 UNITS/KG/HR: 10000 INJECTION, SOLUTION INTRAVENOUS at 16:23

## 2025-01-19 RX ADMIN — GABAPENTIN 300 MG: 300 CAPSULE ORAL at 22:49

## 2025-01-19 RX ADMIN — BUMETANIDE 1 MG: 0.25 INJECTION INTRAMUSCULAR; INTRAVENOUS at 08:20

## 2025-01-19 RX ADMIN — MAGNESIUM SULFATE HEPTAHYDRATE 2 G: 2 INJECTION, SOLUTION INTRAVENOUS at 09:19

## 2025-01-19 RX ADMIN — ACETAMINOPHEN 650 MG: 650 SOLUTION ORAL at 09:17

## 2025-01-19 RX ADMIN — PROCHLORPERAZINE EDISYLATE 5 MG: 5 INJECTION INTRAMUSCULAR; INTRAVENOUS at 10:23

## 2025-01-19 RX ADMIN — MEROPENEM 1000 MG: 1 INJECTION, POWDER, FOR SOLUTION INTRAVENOUS at 20:27

## 2025-01-19 RX ADMIN — HYDROCODONE BITARTRATE AND ACETAMINOPHEN 1 TABLET: 5; 325 TABLET ORAL at 04:47

## 2025-01-19 RX ADMIN — MAGNESIUM SULFATE HEPTAHYDRATE 2 G: 2 INJECTION, SOLUTION INTRAVENOUS at 11:31

## 2025-01-19 RX ADMIN — PROCHLORPERAZINE EDISYLATE 5 MG: 5 INJECTION INTRAMUSCULAR; INTRAVENOUS at 04:33

## 2025-01-19 RX ADMIN — Medication 1 APPLICATION: at 20:27

## 2025-01-19 RX ADMIN — PROCHLORPERAZINE EDISYLATE 5 MG: 5 INJECTION INTRAMUSCULAR; INTRAVENOUS at 20:28

## 2025-01-19 RX ADMIN — ONDANSETRON 4 MG: 2 INJECTION INTRAMUSCULAR; INTRAVENOUS at 15:42

## 2025-01-19 RX ADMIN — GABAPENTIN 300 MG: 300 CAPSULE ORAL at 05:02

## 2025-01-19 RX ADMIN — BUMETANIDE 1 MG: 0.25 INJECTION INTRAMUSCULAR; INTRAVENOUS at 16:32

## 2025-01-19 RX ADMIN — INSULIN HUMAN 14.3 UNITS/HR: 1 INJECTION, SOLUTION INTRAVENOUS at 09:17

## 2025-01-19 RX ADMIN — LINEZOLID 600 MG: 600 INJECTION, SOLUTION INTRAVENOUS at 08:20

## 2025-01-19 RX ADMIN — HYDROCODONE BITARTRATE AND ACETAMINOPHEN 1 TABLET: 5; 325 TABLET ORAL at 18:35

## 2025-01-19 RX ADMIN — DEXMEDETOMIDINE 0.3 MCG/KG/HR: 200 INJECTION, SOLUTION INTRAVENOUS at 02:07

## 2025-01-19 RX ADMIN — GABAPENTIN 300 MG: 300 CAPSULE ORAL at 13:34

## 2025-01-19 RX ADMIN — MEROPENEM 1000 MG: 1 INJECTION, POWDER, FOR SOLUTION INTRAVENOUS at 04:19

## 2025-01-19 RX ADMIN — Medication 1 APPLICATION: at 08:11

## 2025-01-19 RX ADMIN — HEPARIN SODIUM 25 UNITS/KG/HR: 10000 INJECTION, SOLUTION INTRAVENOUS at 09:17

## 2025-01-19 RX ADMIN — ONDANSETRON 4 MG: 2 INJECTION INTRAMUSCULAR; INTRAVENOUS at 02:06

## 2025-01-19 RX ADMIN — MAGNESIUM SULFATE HEPTAHYDRATE 2 G: 2 INJECTION, SOLUTION INTRAVENOUS at 06:56

## 2025-01-19 RX ADMIN — HYDROCODONE BITARTRATE AND ACETAMINOPHEN 1 TABLET: 5; 325 TABLET ORAL at 11:31

## 2025-01-19 RX ADMIN — NYSTATIN: 100000 POWDER TOPICAL at 08:11

## 2025-01-19 RX ADMIN — ONDANSETRON 4 MG: 2 INJECTION INTRAMUSCULAR; INTRAVENOUS at 08:25

## 2025-01-19 RX ADMIN — HEPARIN SODIUM 25 UNITS/KG/HR: 10000 INJECTION, SOLUTION INTRAVENOUS at 23:49

## 2025-01-19 RX ADMIN — INSULIN HUMAN 6.9 UNITS/HR: 1 INJECTION, SOLUTION INTRAVENOUS at 19:20

## 2025-01-19 RX ADMIN — HEPARIN SODIUM 25 UNITS/KG/HR: 10000 INJECTION, SOLUTION INTRAVENOUS at 02:07

## 2025-01-19 NOTE — PROGRESS NOTES
"INTENSIVIST   PROGRESS NOTE     Hospital:  LOS: 17 days     Chief Complaint: Respiratory failure     Subjective   S     Interval History: No major issues overnight. Hemodynamically stable, afebrile at time of assessment this morning. More awake/interactive and subjectively better this AM. Stable oxygen saturations on NC. She feels fatigue and is motivated to have feeding tube removed. Patient denies CP, presyncope, NVD    The patient's relevant past medical, surgical and social history were reviewed and updated in Epic as appropriate.      ROS: Reviewed, negative.    Objective   O     Intake/Ouptut 24 hrs (7:00AM - 6:59 AM)  Intake & Output (last 3 days)         01/15 0701  01/16 0700 01/16 0701  01/17 0700 01/17 0701 01/18 0700 01/18 0701 01/19 0700    I.V. (mL/kg) 3575.2 (21.7) 2804.6 (16.9) 2008.1 (12.1) 386 (2.3)    Other 461 450 353     NG/GT 1433 1536 976     IV Piggyback 1125 1000 1000     Total Intake(mL/kg) 6594.2 (40) 5790.6 (34.9) 4337.1 (26.1) 386 (2.3)    Urine (mL/kg/hr) 2650 (0.7) 3425 (0.9) 3430 (0.9) 300 (0.4)    Emesis/NG output 1200 1150 0     Stool 1 0 0 0    Total Output 3851 4575 3430 300    Net +2743.2 +1215.6 +907.1 +86            Stool Unmeasured Occurrence  4 x 2 x 1 x    Emesis Unmeasured Occurrence   1 x      Medications (drips):  heparin, Last Rate: 25 Units/kg/hr (01/19/25 0917)  insulin, Last Rate: 8.9 Units/hr (01/19/25 1312)  Pharmacy to Dose Heparin    Respiratory Support: Nasal cannula    Physical Examination:  Vital Signs: Blood pressure 160/100, pulse 90, temperature 97.6 °F (36.4 °C), temperature source Bladder, resp. rate 18, height 162.6 cm (64.02\"), weight (!) 166 kg (366 lb 3.2 oz), SpO2 94%, not currently breastfeeding.    General: The patient appears in no acute distress. Alert, cooperative and interactive.  Chest: Distant breath sounds.  No wheezing or rhonchi appreciated.  Appropriate oxygen saturations on nasal cannula.  Breathing appears nonlabored.  Cardiac: Normal " rate.  S1S2 auscultated. No murmurs, rubs or gallops.   Abdomen: Soft, non-tender, non-distended, positive bowel sounds in all four quadrants.   Extremities: 1+ lower extremity edema. No clubbing or cyanosis.  Neuro: Motor power grossly intact bilaterally. Sensation intact. Speech fluid and fluent. Thought process coherent.  Psych: Alert and oriented x3. Mood stable.    Lines, Drains & Airways       Active LDAs       Name Placement date Placement time Site Days    CVC Triple Lumen 01/16/25 Left Internal jugular 01/16/25  0500  Internal jugular  2    NG/OG Tube Nasoduodenal 10 Fr Left nostril 01/03/25  1100  Left nostril  15    Urethral Catheter Temperature probe 01/13/25  1155  -- 4             Results from last 7 days   Lab Units 01/19/25  0431 01/18/25  0431 01/17/25  0526   WBC 10*3/mm3 5.15 4.84 5.41   HEMOGLOBIN g/dL 8.1* 8.2* 7.8*   MCV fL 96.4 94.6 94.4   PLATELETS 10*3/mm3 184 144 138*     Results from last 7 days   Lab Units 01/19/25  0431 01/18/25  0431 01/17/25  1325 01/17/25  0526   SODIUM mmol/L 134* 138 135* 137   POTASSIUM mmol/L 3.9 3.8  3.8 3.5 3.7   CO2 mmol/L 30.0* 30.0* 31.0* 29.0   CREATININE mg/dL 0.66 0.66 0.71 0.71   GLUCOSE mg/dL 164* 150* 138* 177*   MAGNESIUM mg/dL 1.5* 1.7 2.0 1.4*   PHOSPHORUS mg/dL  --  4.3 3.8 3.6     Estimated Creatinine Clearance: 181 mL/min (by C-G formula based on SCr of 0.66 mg/dL).  Results from last 7 days   Lab Units 01/18/25  0431 01/17/25  1325 01/17/25  0526   ALK PHOS U/L 124* 136* 129*   BILIRUBIN mg/dL 0.3 0.3 0.3   ALT (SGPT) U/L 19 16 16   AST (SGOT) U/L 53* 44* 44*     Results from last 7 days   Lab Units 01/17/25  1016 01/16/25  0543 01/14/25  0323 01/13/25  0420   PH, ARTERIAL pH units 7.423 7.336* 7.469* 7.362   PCO2, ARTERIAL mm Hg 41.7 45.9* 44.2 42.6   PO2 ART mm Hg 109.0* 92.9 71.5* 131.0*   FIO2 % 45 65 40 45     Images:  XR Chest 1 View    Result Date: 1/19/2025  Impression: 1. Improving pulmonary vascular congestion 2. Persistent left  basilar airspace disease and small left pleural effusion Electronically Signed: Dvaid Markus  1/19/2025 9:30 AM EST  Workstation ID: [id number]    XR Abdomen KUB    Result Date: 1/18/2025  Impression: Enteric tube within the stomach. Electronically Signed: Cristal Matt MD  1/18/2025 4:08 AM EST  Workstation ID: [id number]    XR Abdomen KUB    Result Date: 1/18/2025  Impression: Enteric tube within the stomach. Electronically Signed: Cristal Matt MD  1/18/2025 2:56 AM EST  Workstation ID: [id number]     Results: Reviewed.  - I reviewed the patient's new laboratory and imaging results.  - I independently reviewed the patient's new images.    Medications: Reviewed.    Assessment & Plan    A / P     Active Hospital Problems:  Active Hospital Problems    Diagnosis  POA    **Acute respiratory failure with hypercapnia [J96.02]  Unknown    Sepsis [A41.9]  Unknown    Type 2 diabetes mellitus with hyperglycemia [E11.65]  Yes    MARIAA (acute kidney injury) [N17.9]  Yes      Resolved Hospital Problems   No resolved problems to display.     39F with a history of HLD, Hypothyroidism, Afib, PE/DVT, Factor V Leiden mutation, and stroke who presented to [facility] with altered mental status. Labs there were significant for renal failure, hyperglycemia, and hypercapnic respiratory failure. Imaging consistent with basilar pulmonary infiltrates concerning for pneumonia. She was intubated and required the initiation of vasopressors. She was transferred to [facility] on 1/2/25 for ongoing care     On arrival, she required ongoing mechanical ventilation and vasopressor support. She has been started on Vancomycin, Flagyl and Cefepime. Cultures are significant for MRSA in the sputum along with Gemella sanguinis in the blood      Nephrology has been following for MARIAA with poor urine output and hyperkalemia          On the morning of 1/4/25, she developed worsening hypoxia despite an FiO2 of 100% and PEEP of 16. Imaging showed white out of the left lung.  Bronchoscopy was performed with mucopurulent secretions suctioned from the left upper and left lower lobes. Repeat CXR after bronchoscopy showed some improvement in left upper lobe aeration with persistent left lower lobe disease vs pleural effusion     Patient has worsening renal failure and started on CRRT which eventually was transitioned to intermittent hemodialysis and now DC.      Due to persistent fevers bronchoscopy was done on 1/14 with large amount of secretions suctioned out from left lung field.  Central line was also discontinued and PICC line placed.  Arterial line discontinued 1/15. Patient  PICC line was accidentally dislodged.  New left IJ triple-lumen catheter was placed (1/16)     - Patient presented here with respiratory failure on mechanical ventilation. Found to have MRSA pneumonia and started on Zyvox. 1 out of 2 blood culture positive for coag negative staph. Repeat cultures have been negative so likely contaminant. She continued to have intermittent fevers and repeat CTX (+) ESBL Klebsiella. ID team is following and managing ABX  - Continue heparin drip with history of atrial fibrillation and factor V Leiden with previous thromboembolic disease. Seems to be tolerating well.  No acute bleed noted. Hemoglobin low but stable.  Will need to transition to Eliquis eventually  - Respiratory wise doing better. Extubated on 1/17 and currently on NC. Diuresing as tolerated, dosed more aggressively on 1/19 and will assess renal function again tomorrow. Will continue to monitor closely. BiPAP support as needed for respiratory distress  - Renal function has globally improved.  Nephrology team is signed off iHD. Pulled hemodialysis catheter   - Patient had multiple large bowel movements; Stopped Relistor   - Continue enteral nutrition as tolerated. Feeding tube replaced/repositioned but still coiled in stomach. TF had been on hold but will restart trophic on 1/18. Cleared for speech evaluation over the  next 24 hours   - Continue current insulin gtt   - DC Precedex on 1/19  - GI prophylaxis     If stable, may be able to leave the ICU in the next 24 hours.     Advance Directives:   Code Status and Medical Interventions: CPR (Attempt to Resuscitate); Full Support   Ordered at: 01/02/25 1952     Code Status (Patient has no pulse and is not breathing):    CPR (Attempt to Resuscitate)     Medical Interventions (Patient has pulse or is breathing):    Full Support     High level of risk due to severe exacerbation of chronic illness and illness with threat to life or bodily function.    I conducted multidisciplinary rounds in the plan of care was discussed with the multidisciplinary team at that time. In attendance at multidisciplinary rounds was clinical pharmacist, dietitian, nursing staff and case management.    I discussed the patient's findings and my recommendations with patient, family, and nursing staff.     -- Marquis Rees MD  Pulmonary/Critical Care

## 2025-01-19 NOTE — PLAN OF CARE
Problem: Adult Inpatient Plan of Care  Goal: Plan of Care Review  Outcome: Progressing  Flowsheets (Taken 1/19/2025 0402)  Progress: no change  Outcome Evaluation: Patient rested well tonight. Patient's precidex now at 0.2 and RN plans to wean if off by the morning. Patient is tolerating well. Patient moving all extremities better this morning, patient continues to require intermittent pain medication for chronic pain. Patient had one BM tonight. Patient does continue to be nauseated between care. Plan is for PT and OT to evaluate patient today. Continue to monitor closely, continue with current plan of care.  Plan of Care Reviewed With: patient

## 2025-01-19 NOTE — THERAPY RE-EVALUATION
Patient Name: Natalia Arzate  : 1985    MRN: [id number]                              Today's Date: 2025       Admit Date: 2025    Visit Dx:     ICD-10-CM ICD-9-CM   1. Respiratory acidosis with metabolic acidosis  E87.4 276.3   2. Acute respiratory failure with hypoxia  J96.01 518.81   3. MARIAA (acute kidney injury)  N17.9 584.9     Patient Active Problem List   Diagnosis    Nephrolithiasis    Ovarian teratoma, right    Other chronic pain    Pelvic pain    History of DVT in adulthood    History of pulmonary embolism    Ureteral calculus    Ischemic cerebrovascular accident (CVA)    Primary hypertension    Left lumbar radiculopathy    PTSD (post-traumatic stress disorder)    MARIAA (acute kidney injury)    Anemia    Dyslipidemia    Hypothyroid    Other secondary gout, multiple sites    Factor 5 Leiden mutation, heterozygous    Vitamin D deficiency    Vitamin B 12 deficiency    Type 2 diabetes mellitus with hyperglycemia    Hoarseness, persistent    Ureterolithiasis    Acute cystitis without hematuria    Hepatic steatosis    Right flank pain, chronic    Detrusor instability of bladder    Frequency of micturition    Acute respiratory failure with hypercapnia    Hyperkalemia    Sepsis    Respiratory acidosis with metabolic acidosis    Acute respiratory failure     Past Medical History:   Diagnosis Date    A-fib     Abnormal ECG     Anemia     Anxiety     Asthma     Cancer     Ovarian    Depression     Diabetes mellitus     DVT (deep venous thrombosis)     Factor 5 Leiden mutation, heterozygous     Fibroid     GERD (gastroesophageal reflux disease)     Gout     H/O abdominal abscess     History of sepsis     History of transfusion     Hyperlipidemia     Hypothyroid     Kidney stone     Migraines     Neuropathy     Ovarian cancer 2021    Ovarian cyst     PE (pulmonary embolism)     Polycystic ovary syndrome     Preeclampsia     Rh incompatibility     Stroke     TIA (transient ischemic attack)      Urinary tract infection     Varicella      Past Surgical History:   Procedure Laterality Date    ABDOMINAL SURGERY      CARDIAC CATHETERIZATION       SECTION      CHOLECYSTECTOMY      COLONOSCOPY      ENDOSCOPY      EXTRACORPOREAL SHOCK WAVE LITHOTRIPSY (ESWL) Left 10/22/2021    Procedure: EXTRACORPOREAL SHOCKWAVE LITHOTRIPSY;  Surgeon: Milan Motley MD;  Location: [facility];  Service: Urology;  Laterality: Left;    HYSTERECTOMY  2017    ovarian ca    INSERT CENTRAL LINE AT BEDSIDE  2025    LITHOTRIPSY      NEPHRECTOMY Left 10/2022    RIGHT OOPHORECTOMY      URETEROSCOPY LASER LITHOTRIPSY WITH STENT INSERTION Left 10/01/2021    Procedure: URETEROSCOPY WITH STENT PLACEMENT;  Surgeon: Milan Motley MD;  Location: [facility];  Service: Urology;  Laterality: Left;    URETEROSCOPY LASER LITHOTRIPSY WITH STENT INSERTION Left 2022    Procedure: URETEROSCOPY STENT REMOVAL;  Surgeon: Milan Motley MD;  Location: [facility];  Service: Urology;  Laterality: Left;    URETEROSCOPY LASER LITHOTRIPSY WITH STENT INSERTION Left 2022    Procedure: CYSTOSCOPY RETROGRADE LEFT WITH STENT PLACEMENT;  Surgeon: Milan Motley MD;  Location: [facility];  Service: Urology;  Laterality: Left;      General Information       Row Name 25 1111          Physical Therapy Time and Intention    Document Type re-evaluation  -ML     Mode of Treatment physical therapy  -ML       Row Name 25 1111          General Information    Patient Profile Reviewed yes  -ML     Prior Level of Function independent:;all household mobility;gait;transfer;bed mobility;ADL's;dependent:;driving  patient reports ambulating with RW at baseline, completes sponge baths  -ML     Existing Precautions/Restrictions fall;oxygen therapy device and L/min  NG  -ML     Barriers to Rehab medically complex;previous functional deficit;physical barrier  -ML       Row Name 25 1111          Living Environment     People in Home child(caroline), adult;significant other  -ML       Row Name 01/19/25 1111          Home Main Entrance    Number of Stairs, Main Entrance one  -ML       Row Name 01/19/25 1111          Stairs Within Home, Primary    Stairs, Within Home, Primary patient lives on the main level of a 2 story home  -ML       Row Name 01/19/25 1111          Cognition    Orientation Status (Cognition) oriented x 3  -ML       Row Name 01/19/25 1111          Safety Issues/Impairments Affecting Functional Mobility    Safety Issues Affecting Function (Mobility) awareness of need for assistance;friction/shear risk;insight into deficits/self-awareness;safety precaution awareness;safety precautions follow-through/compliance;sequencing abilities  -ML     Impairments Affecting Function (Mobility) balance;endurance/activity tolerance;motor planning;postural/trunk control;range of motion (ROM);sensation/sensory awareness;shortness of breath;strength  -ML               User Key  (r) = Recorded By, (t) = Taken By, (c) = Cosigned By      Initials Name Provider Type     Debbi Hall Physical Therapist                   Mobility       Row Name 01/19/25 1115          Bed Mobility    Bed Mobility scooting/bridging;supine-sit;sit-supine  -ML     Scooting/Bridging [place] (Bed Mobility) verbal cues;dependent (less than 25% patient effort);2 person assist;1 person to manage equipment  -ML     Supine-Sit [place] (Bed Mobility) verbal cues;nonverbal cues (demo/gesture);moderate assist (50% patient effort);2 person assist  -ML     Sit-Supine [place] (Bed Mobility) verbal cues;nonverbal cues (demo/gesture);dependent (less than 25% patient effort);2 person assist  -ML     Assistive Device (Bed Mobility) bed rails;head of bed elevated;repositioning sheet  -ML     Comment, (Bed Mobility) patient reports feeling 'woozey' after sitting at EOB for several minutes, returned to supine position due to patient's symptoms  -ML       Row Name  01/19/25 1115          Bed-Chair Transfer    Bed-Chair [place] (Transfers) unable to assess  -ML       Row Name 01/19/25 1115          Sit-Stand Transfer    Sit-Stand [place] (Transfers) unable to assess  -ML               User Key  (r) = Recorded By, (t) = Taken By, (c) = Cosigned By      Initials Name Provider Type    Debbi Lam Physical Therapist                   Obj/Interventions       Row Name 01/19/25 1117          Range of Motion Comprehensive    General Range of Motion bilateral lower extremity ROM WFL  -ML       Row Name 01/19/25 1117          Strength Comprehensive (MMT)    General Manual Muscle Testing (MMT) Assessment lower extremity strength deficits identified  -ML     Comment, General Manual Muscle Testing (MMT) Assessment BLE grossly 3/5, difficult to complete formal assessment due to positioning at EOB  -ML       Row Name 01/19/25 1117          Balance    Balance Assessment sitting static balance;sitting dynamic balance  -ML     Static Sitting Balance contact guard  -ML     Dynamic Sitting Balance moderate assist  -ML     Position, Sitting Balance supported;sitting edge of bed  -ML     Balance Interventions sitting;supported;static;dynamic  -ML       Row Name 01/19/25 1117          Sensory Assessment (Somatosensory)    Bilateral LE Sensory Assessment general sensation;impaired;other (see comments)  peripheral neuropathy  -ML               User Key  (r) = Recorded By, (t) = Taken By, (c) = Cosigned By      Initials Name Provider Type    Debbi Lam Physical Therapist                   Goals/Plan       Row Name 01/19/25 1123          Bed Mobility Goal 1 (PT)    Activity/Assistive Device (Bed Mobility Goal 1, PT) sit to supine;supine to sit;rolling to left;rolling to right  -ML     [place] Level/Cues Needed (Bed Mobility Goal 1, PT) contact guard required  -ML     Time Frame (Bed Mobility Goal 1, PT) short term goal (STG);5 days  -ML       Row Name 01/19/25 1123           Transfer Goal 1 (PT)    Activity/Assistive Device (Transfer Goal 1, PT) sit-to-stand/stand-to-sit;bed-to-chair/chair-to-bed;walker, rolling  -ML     [place] Level/Cues Needed (Transfer Goal 1, PT) minimum assist (75% or more patient effort)  -ML     Time Frame (Transfer Goal 1, PT) long term goal (LTG);10 days  -ML       Row Name 01/19/25 1123          Gait Training Goal 1 (PT)    Activity/Assistive Device (Gait Training Goal 1, PT) gait (walking locomotion);decrease fall risk;improve balance and speed;increase endurance/gait distance;walker, rolling  -ML     [place] Level (Gait Training Goal 1, PT) minimum assist (75% or more patient effort)  -ML     Distance (Gait Training Goal 1, PT) 15  -ML     Time Frame (Gait Training Goal 1, PT) long term goal (LTG);10 days  -ML       Row Name 01/19/25 1123          Therapy Assessment/Plan (PT)    Planned Therapy Interventions (PT) balance training;bed mobility training;gait training;home exercise program;patient/family education;postural re-education;strengthening;transfer training;ROM (range of motion);neuromuscular re-education  -ML               User Key  (r) = Recorded By, (t) = Taken By, (c) = Cosigned By      Initials Name Provider Type    ML Debbi Hall Physical Therapist                   Clinical Impression       Row Name 01/19/25 1118          Pain    Pretreatment Pain Rating 0/10 - no pain  -ML     Posttreatment Pain Rating 0/10 - no pain  -ML       Row Name 01/19/25 1118          Plan of Care Review    Plan of Care Reviewed With patient  -ML     Outcome Evaluation Physical therapy mobility evaluation complete. The patient motivated to participate in PT to assist with return to PLOF. Patient limited in time sitting at EOB due to symptoms of dizziness, BP stable. Patient unable to attempt sit to stand transfer. Patient presents below baseline for mobility and would continue to benefit from skilled PT to address strength, balance and activity tolerance  deficits.  -ML       Row Name 01/19/25 1118          Therapy Assessment/Plan (PT)    Patient/Family Therapy Goals Statement (PT) return to PLOF  -ML     Rehab Potential (PT) fair  -ML     Criteria for Skilled Interventions Met (PT) yes;meets criteria;skilled treatment is necessary  -ML     Therapy Frequency (PT) daily  -ML     Predicted Duration of Therapy Intervention (PT) 10 days  -ML       Row Name 01/19/25 1118          Vital Signs    Pre Systolic BP Rehab 168  -ML     Pre Treatment Diastolic BP 97  -ML     Post Systolic BP Rehab 170  -ML     Post Treatment Diastolic BP 96  -ML     Pretreatment Heart Rate (beats/min) 95  -ML     Intratreatment Heart Rate (beats/min) 124  -ML     Posttreatment Heart Rate (beats/min) 92  -ML     Pre SpO2 (%) 91  -ML     O2 Delivery Pre Treatment nasal cannula  -ML     Post SpO2 (%) 92  -ML     O2 Delivery Post Treatment nasal cannula  -ML     Pre Patient Position Supine  -ML     Intra Patient Position Sitting  -ML     Post Patient Position Supine  -ML       Row Name 01/19/25 1118          Positioning and Restraints    Pre-Treatment Position in bed  -ML     Post Treatment Position bed  -ML     In Bed notified nsg;side lying left;call light within reach;encouraged to call for assist;side rails up x2;with family/caregiver  -ML               User Key  (r) = Recorded By, (t) = Taken By, (c) = Cosigned By      Initials Name Provider Type    Debbi Lam Physical Therapist                   Outcome Measures       Row Name 01/19/25 1124          How much help from another person do you currently need...    Turning from your back to your side while in flat bed without using bedrails? 2  -ML     Moving from lying on back to sitting on the side of a flat bed without bedrails? 2  -ML     Moving to and from a bed to a chair (including a wheelchair)? 1  -ML     Standing up from a chair using your arms (e.g., wheelchair, bedside chair)? 1  -ML     Climbing 3-5 steps with a railing? 1  -ML      To walk in hospital room? 1  -ML     AM-PAC 6 Clicks Score (PT) 8  -ML     Highest Level of Mobility Goal 3 --> Sit at edge of bed  -ML       Row Name 01/19/25 1124          Functional Assessment    Outcome Measure Options AM-PAC 6 Clicks Basic Mobility (PT)  -ML               User Key  (r) = Recorded By, (t) = Taken By, (c) = Cosigned By      Initials Name Provider Type     Debbi Hall Physical Therapist                                 Physical Therapy Education       Title: PT OT SLP Therapies (In Progress)       Topic: Physical Therapy (In Progress)       Point: Mobility training (In Progress)       Learning Progress Summary            Patient Acceptance, E, NR by ML at 1/19/2025 1124   Family Acceptance, E, NR by ML at 1/19/2025 1124                      Point: Home exercise program (Not Started)       Learner Progress:  Not documented in this visit.              Point: Body mechanics (Not Started)       Learner Progress:  Not documented in this visit.              Point: Precautions (In Progress)       Learning Progress Summary            Patient Acceptance, E, NR by ML at 1/19/2025 1124   Family Acceptance, E, NR by ML at 1/19/2025 1124                                      User Key       Initials Effective Dates Name Provider Type Discipline     04/22/21 -  Debbi Hall Physical Therapist PT                  PT Recommendation and Plan  Planned Therapy Interventions (PT): balance training, bed mobility training, gait training, home exercise program, patient/family education, postural re-education, strengthening, transfer training, ROM (range of motion), neuromuscular re-education  Outcome Evaluation: Physical therapy mobility evaluation complete. The patient motivated to participate in PT to assist with return to PLOF. Patient limited in time sitting at EOB due to symptoms of dizziness, BP stable. Patient unable to attempt sit to stand transfer. Patient presents below baseline for mobility and would  continue to benefit from skilled PT to address strength, balance and activity tolerance deficits.     Time Calculation:         PT Charges       Row Name 01/19/25 1125             Time Calculation    Start Time 0930  -ML      PT Received On 01/19/25  -ML      PT Goal Re-Cert Due Date 01/29/25  -ML         Timed Charges    02034 - PT Therapeutic Activity Minutes 25  -ML         Untimed Charges    PT Eval/Re-eval Minutes 30  -ML         Total Minutes    Timed Charges Total Minutes 25  -ML      Untimed Charges Total Minutes 30  -ML       Total Minutes 55  -ML                User Key  (r) = Recorded By, (t) = Taken By, (c) = Cosigned By      Initials Name Provider Type    Debbi Lam Physical Therapist                  Therapy Charges for Today       Code Description Service Date Service Provider Modifiers Qty    94060284495 HC PT THERAPEUTIC ACT EA 15 MIN 1/19/2025 Debbi Hall GP 2    [id number] HC PT RE-EVAL ESTABLISHED PLAN 2 1/19/2025 Debbi Hall GP 1            PT G-Codes  Outcome Measure Options: AM-PAC 6 Clicks Basic Mobility (PT)  AM-PAC 6 Clicks Score (PT): 8  PT Discharge Summary  Anticipated Discharge Disposition (PT): inpatient rehabilitation facility    Debbi Hall  1/19/2025

## 2025-01-19 NOTE — PLAN OF CARE
Goal Outcome Evaluation:  Plan of Care Reviewed With: patient, significant other           Outcome Evaluation: OT initial eval and expanded chart review completed. Pt presents with multiple comorbidities and decreased strength, balance, activity tolerance limtiting independence with ADL's and mobility from baseline status. Recommend continued skilled OT services and transfer to IRF at d/c.    Anticipated Discharge Disposition (OT): inpatient rehabilitation facility

## 2025-01-19 NOTE — PLAN OF CARE
Goal Outcome Evaluation:  Plan of Care Reviewed With: patient           Outcome Evaluation: Physical therapy mobility evaluation complete. The patient motivated to participate in PT to assist with return to PLOF. Patient limited in time sitting at EOB due to symptoms of dizziness, BP stable. Patient unable to attempt sit to stand transfer. Patient presents below baseline for mobility and would continue to benefit from skilled PT to address strength, balance and activity tolerance deficits.    Anticipated Discharge Disposition (PT): inpatient rehabilitation facility

## 2025-01-19 NOTE — THERAPY EVALUATION
Patient Name: Natalia Arzate  : 1985    MRN: [id number]                              Today's Date: 2025       Admit Date: 2025    Visit Dx:     ICD-10-CM ICD-9-CM   1. Respiratory acidosis with metabolic acidosis  E87.4 276.3   2. Acute respiratory failure with hypoxia  J96.01 518.81   3. MARIAA (acute kidney injury)  N17.9 584.9     Patient Active Problem List   Diagnosis    Nephrolithiasis    Ovarian teratoma, right    Other chronic pain    Pelvic pain    History of DVT in adulthood    History of pulmonary embolism    Ureteral calculus    Ischemic cerebrovascular accident (CVA)    Primary hypertension    Left lumbar radiculopathy    PTSD (post-traumatic stress disorder)    MARIAA (acute kidney injury)    Anemia    Dyslipidemia    Hypothyroid    Other secondary gout, multiple sites    Factor 5 Leiden mutation, heterozygous    Vitamin D deficiency    Vitamin B 12 deficiency    Type 2 diabetes mellitus with hyperglycemia    Hoarseness, persistent    Ureterolithiasis    Acute cystitis without hematuria    Hepatic steatosis    Right flank pain, chronic    Detrusor instability of bladder    Frequency of micturition    Acute respiratory failure with hypercapnia    Hyperkalemia    Sepsis    Respiratory acidosis with metabolic acidosis    Acute respiratory failure     Past Medical History:   Diagnosis Date    A-fib     Abnormal ECG     Anemia     Anxiety     Asthma     Cancer     Ovarian    Depression     Diabetes mellitus     DVT (deep venous thrombosis)     Factor 5 Leiden mutation, heterozygous     Fibroid     GERD (gastroesophageal reflux disease)     Gout     H/O abdominal abscess     History of sepsis     History of transfusion     Hyperlipidemia     Hypothyroid     Kidney stone     Migraines     Neuropathy     Ovarian cancer 2021    Ovarian cyst     PE (pulmonary embolism)     Polycystic ovary syndrome     Preeclampsia     Rh incompatibility     Stroke     TIA (transient ischemic attack)      Urinary tract infection     Varicella      Past Surgical History:   Procedure Laterality Date    ABDOMINAL SURGERY      CARDIAC CATHETERIZATION       SECTION      CHOLECYSTECTOMY      COLONOSCOPY      ENDOSCOPY      EXTRACORPOREAL SHOCK WAVE LITHOTRIPSY (ESWL) Left 10/22/2021    Procedure: EXTRACORPOREAL SHOCKWAVE LITHOTRIPSY;  Surgeon: Milan Motley MD;  Location: [facility];  Service: Urology;  Laterality: Left;    HYSTERECTOMY  2017    ovarian ca    INSERT CENTRAL LINE AT BEDSIDE  2025    LITHOTRIPSY      NEPHRECTOMY Left 10/2022    RIGHT OOPHORECTOMY      URETEROSCOPY LASER LITHOTRIPSY WITH STENT INSERTION Left 10/01/2021    Procedure: URETEROSCOPY WITH STENT PLACEMENT;  Surgeon: Milan Motley MD;  Location: [facility];  Service: Urology;  Laterality: Left;    URETEROSCOPY LASER LITHOTRIPSY WITH STENT INSERTION Left 2022    Procedure: URETEROSCOPY STENT REMOVAL;  Surgeon: Milan Motley MD;  Location: [facility];  Service: Urology;  Laterality: Left;    URETEROSCOPY LASER LITHOTRIPSY WITH STENT INSERTION Left 2022    Procedure: CYSTOSCOPY RETROGRADE LEFT WITH STENT PLACEMENT;  Surgeon: Milan Motley MD;  Location: [facility];  Service: Urology;  Laterality: Left;      General Information       Row Name 25 1155          OT Time and Intention    Document Type evaluation  -JR     Mode of Treatment occupational therapy  -JR       Row Name 25 1155          General Information    Patient Profile Reviewed yes  -JR     Prior Level of Function independent:;all household mobility;gait;transfer;bed mobility;ADL's;dependent:;driving  Pt reports she uses a RW for mobility, and completes sponge baths. Pt reports she is independent with ADL's.  -JR     Existing Precautions/Restrictions fall;oxygen therapy device and L/min;other (see comments)  cristina BUCKNER  -     Barriers to Rehab medically complex;previous functional deficit;physical barrier  -JR       Row  "Name 01/19/25 1155          Living Environment    People in Home child(caroline), adult;significant other  -       Row Name 01/19/25 1155          Home Main Entrance    Number of Stairs, Main Entrance one  -       Row Name 01/19/25 1155          Stairs Within Home, Primary    Stairs, Within Home, Primary Pt reports she does not go upstairs  -       Row Name 01/19/25 1155          Cognition    Orientation Status (Cognition) oriented x 3  -       Row Name 01/19/25 1155          Safety Issues/Impairments Affecting Functional Mobility    Safety Issues Affecting Function (Mobility) awareness of need for assistance;insight into deficits/self-awareness;judgment;problem-solving;safety precaution awareness;safety precautions follow-through/compliance;sequencing abilities  -     Impairments Affecting Function (Mobility) balance;endurance/activity tolerance;shortness of breath;strength;motor planning;range of motion (ROM)  -               User Key  (r) = Recorded By, (t) = Taken By, (c) = Cosigned By      Initials Name Provider Type     Alpa Bangura OT Occupational Therapist                     Mobility/ADL's       Row Name 01/19/25 1152          Bed Mobility    Bed Mobility supine-sit;sit-supine;scooting/bridging  -     Scooting/Bridging [place] (Bed Mobility) dependent (less than 25% patient effort);2 person assist;verbal cues;1 person to manage equipment  -     Supine-Sit [place] (Bed Mobility) moderate assist (50% patient effort);2 person assist;verbal cues  -     Sit-Supine [place] (Bed Mobility) dependent (less than 25% patient effort);2 person assist;verbal cues  -     Assistive Device (Bed Mobility) bed rails;head of bed elevated;repositioning sheet  -     Comment, (Bed Mobility) Pt required increased time and verbal cues for supine to sit on EOB with therapist blocking pannus to prevent sliding. Once sitting EOB, HR increased and pt reported feeling \"woozy.\" Pt returned to " supine, /96. Other staff arrived to assist with scooting pt to HOB.  -       Row Name 01/19/25 1157          Transfers    Comment, (Transfers) Unable to assess this date  -[facility] Name 01/19/25 1157          Activities of Daily Living    BADL Assessment/Intervention lower body dressing;toileting  -[facility] Name 01/19/25 1157          Lower Body Dressing Assessment/Training    [place] Level (Lower Body Dressing) don;socks;dependent (less than 25% patient effort)  -     Position (Lower Body Dressing) supine  -[facility] Name 01/19/25 1157          Toileting Assessment/Training    [place] Level (Toileting) change pad/brief;perform perineal hygiene;dependent (less than 25% patient effort)  -     Position (Toileting) supine  -               User Key  (r) = Recorded By, (t) = Taken By, (c) = Cosigned By      Initials Name Provider Type    Alpa Matamoros, OT Occupational Therapist                   Obj/Interventions       [facility] Name 01/19/25 1200          Sensory Assessment (Somatosensory)    Sensory Assessment (Somatosensory) UE sensation intact  -[facility] Name 01/19/25 1200          Range of Motion Comprehensive    General Range of Motion bilateral upper extremity ROM WFL  -[facility] Name 01/19/25 1200          Strength Comprehensive (MMT)    Comment, General Manual Muscle Testing (MMT) Assessment R UE functionally 2+ or 3/5. Pt reports weakness in R UE is new since admit. Unable to formally test L UE due to positioning.  -[facility] Name 01/19/25 1200          Balance    Balance Assessment sitting static balance  -     Static Sitting Balance contact guard;verbal cues  -     Dynamic Sitting Balance moderate assist;verbal cues  -     Position, Sitting Balance supported;sitting edge of bed  -               User Key  (r) = Recorded By, (t) = Taken By, (c) = Cosigned By      Initials Name Provider Type    Alpa Matamoros, OT Occupational Therapist                    Goals/Plan       Row Name 01/19/25 1204          Bed Mobility Goal 1 (OT)    Activity/Assistive Device (Bed Mobility Goal 1, OT) sit to supine/supine to sit  -JR     [place] Level/Cues Needed (Bed Mobility Goal 1, OT) minimum assist (75% or more patient effort);verbal cues required  -JR     Time Frame (Bed Mobility Goal 1, OT) short term goal (STG);5 days  -JR     Progress/Outcomes (Bed Mobility Goal 1, OT) new goal  -JR       Row Name 01/19/25 1204          Transfer Goal 1 (OT)    Activity/Assistive Device (Transfer Goal 1, OT) bed-to-chair/chair-to-bed  -JR     [place] Level/Cues Needed (Transfer Goal 1, OT) moderate assist (50-74% patient effort);verbal cues required  -JR     Time Frame (Transfer Goal 1, OT) long term goal (LTG);by discharge  -JR     Progress/Outcome (Transfer Goal 1, OT) new goal  -       Row Name 01/19/25 1204          Strength Goal 1 (OT)    Strength Goal 1 (OT) Pt to increase R UE strength by 1/2 muscle grade to support ADL independence.  -JR     Time Frame (Strength Goal 1, OT) long term goal (LTG);by discharge  -JR     Progress/Outcome (Strength Goal 1, OT) new goal  -       Row Name 01/19/25 1204          Therapy Assessment/Plan (OT)    Planned Therapy Interventions (OT) activity tolerance training;adaptive equipment training;BADL retraining;functional balance retraining;occupation/activity based interventions;ROM/therapeutic exercise;transfer/mobility retraining;strengthening exercise;passive ROM/stretching;patient/caregiver education/training;neuromuscular control/coordination retraining  -               User Key  (r) = Recorded By, (t) = Taken By, (c) = Cosigned By      Initials Name Provider Type    Alpa Matamoros, OT Occupational Therapist                   Clinical Impression       Row Name 01/19/25 1202          Pain Assessment    Pretreatment Pain Rating 0/10 - no pain  -     Posttreatment Pain Rating 0/10 - no pain  -       Row Name 01/19/25 1202           Plan of Care Review    Plan of Care Reviewed With patient;significant other  -JR     Outcome Evaluation OT initial eval and expanded chart review completed. Pt presents with multiple comorbidities and decreased strength, balance, activity tolerance limtiting independence with ADL's and mobility from baseline status. Recommend continued skilled OT services and transfer to IRF at d/c.  -       Row Name 01/19/25 1202          Therapy Assessment/Plan (OT)    Patient/Family Therapy Goal Statement (OT) go home  -     Rehab Potential (OT) good  -JR     Criteria for Skilled Therapeutic Interventions Met (OT) yes;meets criteria;skilled treatment is necessary  -     Therapy Frequency (OT) daily  -JR     Predicted Duration of Therapy Intervention (OT) 10 days  -JR       Row Name 01/19/25 1202          Therapy Plan Review/Discharge Plan (OT)    Anticipated Discharge Disposition (OT) inpatient rehabilitation facility  -       Row Name 01/19/25 1202          Vital Signs    Pre Systolic BP Rehab 168  -JR     Pre Treatment Diastolic BP 97  -JR     Post Systolic BP Rehab 170  -JR     Post Treatment Diastolic BP 96  -JR     Pretreatment Heart Rate (beats/min) 95  -JR     Intratreatment Heart Rate (beats/min) 124  -JR     Posttreatment Heart Rate (beats/min) 92  -JR     Pre SpO2 (%) 91  -JR     O2 Delivery Pre Treatment nasal cannula  -JR     Post SpO2 (%) 92  -JR     O2 Delivery Post Treatment nasal cannula  -JR     Pre Patient Position Supine  -JR     Intra Patient Position Sitting  -JR     Post Patient Position Supine  -       Row Name 01/19/25 1202          Positioning and Restraints    Pre-Treatment Position in bed  -JR     Post Treatment Position bed  -JR     In Bed notified nsg;side lying left;call light within reach;encouraged to call for assist;with family/caregiver  -               User Key  (r) = Recorded By, (t) = Taken By, (c) = Cosigned By      Initials Name Provider Type    Alpa Matamoros, OT  Occupational Therapist                   Outcome Measures       Row Name 01/19/25 1205          How much help from another is currently needed...    Putting on and taking off regular lower body clothing? 1  -JR     Bathing (including washing, rinsing, and drying) 2  -JR     Toileting (which includes using toilet bed pan or urinal) 1  -JR     Putting on and taking off regular upper body clothing 2  -JR     Taking care of personal grooming (such as brushing teeth) 2  -JR     Eating meals 1  -JR     AM-PAC 6 Clicks Score (OT) 9  -JR       Row Name 01/19/25 1124          How much help from another person do you currently need...    Turning from your back to your side while in flat bed without using bedrails? 2  -ML     Moving from lying on back to sitting on the side of a flat bed without bedrails? 2  -ML     Moving to and from a bed to a chair (including a wheelchair)? 1  -ML     Standing up from a chair using your arms (e.g., wheelchair, bedside chair)? 1  -ML     Climbing 3-5 steps with a railing? 1  -ML     To walk in hospital room? 1  -ML     AM-PAC 6 Clicks Score (PT) 8  -ML     Highest Level of Mobility Goal 3 --> Sit at edge of bed  -ML       Row Name 01/19/25 1205 01/19/25 1124       Functional Assessment    Outcome Measure Options AM-PAC 6 Clicks Daily Activity (OT)  -JR AM-PAC 6 Clicks Basic Mobility (PT)  -ML              User Key  (r) = Recorded By, (t) = Taken By, (c) = Cosigned By      Initials Name Provider Type    Alpa Matamoros OT Occupational Therapist    Debbi Lam Physical Therapist                    Occupational Therapy Education       Title: PT OT SLP Therapies (In Progress)       Topic: Occupational Therapy (In Progress)       Point: ADL training (Done)       Description:   Instruct learner(s) on proper safety adaptation and remediation techniques during self care or transfers.   Instruct in proper use of assistive devices.                  Learning Progress Summary             Patient Acceptance, E, VU,NR by  at 1/19/2025 0933    Comment: role of therapy, ongoing treatment plan and discharge recommendations   Family Acceptance, E, VU,NR by  at 1/19/2025 0933    Comment: role of therapy, ongoing treatment plan and discharge recommendations                      Point: Home exercise program (Not Started)       Description:   Instruct learner(s) on appropriate technique for monitoring, assisting and/or progressing therapeutic exercises/activities.                  Learner Progress:  Not documented in this visit.              Point: Precautions (Not Started)       Description:   Instruct learner(s) on prescribed precautions during self-care and functional transfers.                  Learner Progress:  Not documented in this visit.              Point: Body mechanics (Done)       Description:   Instruct learner(s) on proper positioning and spine alignment during self-care, functional mobility activities and/or exercises.                  Learning Progress Summary            Patient Acceptance, E, VU,NR by  at 1/19/2025 0933    Comment: role of therapy, ongoing treatment plan and discharge recommendations   Family Acceptance, E, VU,NR by  at 1/19/2025 0933    Comment: role of therapy, ongoing treatment plan and discharge recommendations                                      User Key       Initials Effective Dates Name Provider Type Discipline     02/03/23 -  Alpa Bangura OT Occupational Therapist OT                  OT Recommendation and Plan  Planned Therapy Interventions (OT): activity tolerance training, adaptive equipment training, BADL retraining, functional balance retraining, occupation/activity based interventions, ROM/therapeutic exercise, transfer/mobility retraining, strengthening exercise, passive ROM/stretching, patient/caregiver education/training, neuromuscular control/coordination retraining  Therapy Frequency (OT): daily  Plan of Care Review  Plan of Care  Reviewed With: patient, significant other  Outcome Evaluation: OT initial eval and expanded chart review completed. Pt presents with multiple comorbidities and decreased strength, balance, activity tolerance limtiting independence with ADL's and mobility from baseline status. Recommend continued skilled OT services and transfer to IRF at d/c.     Time Calculation:   Evaluation Complexity (OT)  Review Occupational Profile/Medical/Therapy History Complexity: expanded/moderate complexity  Assessment, Occupational Performance/Identification of Deficit Complexity: 3-5 performance deficits  Clinical Decision Making Complexity (OT): detailed assessment/moderate complexity  Overall Complexity of Evaluation (OT): moderate complexity     Time Calculation- OT       Row Name 01/19/25 1207             Time Calculation- OT    OT Start Time 0933  -JR      OT Received On 01/19/25  -JR      OT Goal Re-Cert Due Date 01/29/25  -         Timed Charges    79632 - OT Self Care/Mgmt Minutes 8  -JR         Untimed Charges    OT Eval/Re-eval Minutes 46  -JR         Total Minutes    Timed Charges Total Minutes 8  -JR      Untimed Charges Total Minutes 46  -JR       Total Minutes 54  -JR                User Key  (r) = Recorded By, (t) = Taken By, (c) = Cosigned By      Initials Name Provider Type    JR lApa Bangura OT Occupational Therapist                  Therapy Charges for Today       Code Description Service Date Service Provider Modifiers Qty    88473510078  OT SELF CARE/MGMT/TRAIN EA 15 MIN 1/19/2025 Alpa Bangura OT GO 1    89081670750 HC OT EVAL MOD COMPLEXITY 4 1/19/2025 Alpa Bangura OT GO 1                 Alpa Bangura, OT  1/19/2025

## 2025-01-19 NOTE — PLAN OF CARE
Problem: Adult Inpatient Plan of Care  Goal: Plan of Care Review  Outcome: Progressing  Flowsheets (Taken 1/19/2025 7677)  Plan of Care Reviewed With:   patient   parent   significant other  VSS on 0.5L NC. Pt remains off Precedex. Mag replaced this AM. Heparin gtt currently infusing at 25u/kg/hr. Insulin gtt titrated per [employer]. PRN pain medication administered. 3050ml UOP. Multiple BMs this shift. X1 emesis episode, multiple episodes of nausea- PRNs administered. SLP consulted for tomorrow. Pt's mother and significant other updated via phone call and at bedside.

## 2025-01-20 ENCOUNTER — ANCILLARY PROCEDURE (OUTPATIENT)
Dept: SPEECH THERAPY | Facility: HOSPITAL | Age: 40
DRG: 870 | End: 2025-01-20
Payer: COMMERCIAL

## 2025-01-20 LAB
ANION GAP SERPL CALCULATED.3IONS-SCNC: 12 MMOL/L (ref 5–15)
BUN SERPL-MCNC: 14 MG/DL (ref 6–20)
BUN/CREAT SERPL: 23 (ref 7–25)
CALCIUM SPEC-SCNC: 9.5 MG/DL (ref 8.6–10.5)
CHLORIDE SERPL-SCNC: 89 MMOL/L (ref 98–107)
CO2 SERPL-SCNC: 30 MMOL/L (ref 22–29)
CREAT SERPL-MCNC: 0.61 MG/DL (ref 0.57–1)
DEPRECATED RDW RBC AUTO: 59 FL (ref 37–54)
EGFRCR SERPLBLD CKD-EPI 2021: 117.5 ML/MIN/1.73
ERYTHROCYTE [DISTWIDTH] IN BLOOD BY AUTOMATED COUNT: 17.4 % (ref 12.3–15.4)
GLUCOSE BLDC GLUCOMTR-MCNC: 146 MG/DL (ref 70–130)
GLUCOSE BLDC GLUCOMTR-MCNC: 147 MG/DL (ref 70–130)
GLUCOSE BLDC GLUCOMTR-MCNC: 156 MG/DL (ref 70–130)
GLUCOSE BLDC GLUCOMTR-MCNC: 160 MG/DL (ref 70–130)
GLUCOSE BLDC GLUCOMTR-MCNC: 163 MG/DL (ref 70–130)
GLUCOSE BLDC GLUCOMTR-MCNC: 174 MG/DL (ref 70–130)
GLUCOSE BLDC GLUCOMTR-MCNC: 196 MG/DL (ref 70–130)
GLUCOSE BLDC GLUCOMTR-MCNC: 199 MG/DL (ref 70–130)
GLUCOSE BLDC GLUCOMTR-MCNC: 205 MG/DL (ref 70–130)
GLUCOSE SERPL-MCNC: 194 MG/DL (ref 65–99)
HCT VFR BLD AUTO: 28.9 % (ref 34–46.6)
HGB BLD-MCNC: 8.9 G/DL (ref 12–15.9)
MAGNESIUM SERPL-MCNC: 1.3 MG/DL (ref 1.6–2.6)
MCH RBC QN AUTO: 28.8 PG (ref 26.6–33)
MCHC RBC AUTO-ENTMCNC: 30.8 G/DL (ref 31.5–35.7)
MCV RBC AUTO: 93.5 FL (ref 79–97)
PLATELET # BLD AUTO: 210 10*3/MM3 (ref 140–450)
PMV BLD AUTO: 9.9 FL (ref 6–12)
POTASSIUM SERPL-SCNC: 3.7 MMOL/L (ref 3.5–5.2)
RBC # BLD AUTO: 3.09 10*6/MM3 (ref 3.77–5.28)
SODIUM SERPL-SCNC: 131 MMOL/L (ref 136–145)
UFH PPP CHRO-ACNC: 0.52 IU/ML (ref 0.3–0.7)
WBC NRBC COR # BLD AUTO: 4.82 10*3/MM3 (ref 3.4–10.8)

## 2025-01-20 PROCEDURE — 63710000001 INSULIN GLARGINE PER 5 UNITS: Performed by: INTERNAL MEDICINE

## 2025-01-20 PROCEDURE — 92612 ENDOSCOPY SWALLOW (FEES) VID: CPT

## 2025-01-20 PROCEDURE — 82948 REAGENT STRIP/BLOOD GLUCOSE: CPT

## 2025-01-20 PROCEDURE — 25010000002 ONDANSETRON PER 1 MG

## 2025-01-20 PROCEDURE — 25010000002 MEROPENEM PER 100 MG: Performed by: INTERNAL MEDICINE

## 2025-01-20 PROCEDURE — 25010000002 PROCHLORPERAZINE 10 MG/2ML SOLUTION

## 2025-01-20 PROCEDURE — 25010000002 HEPARIN (PORCINE) 25000-0.45 UT/250ML-% SOLUTION

## 2025-01-20 PROCEDURE — 25010000002 BUMETANIDE PER 0.5 MG: Performed by: INTERNAL MEDICINE

## 2025-01-20 PROCEDURE — 25010000002 MAGNESIUM SULFATE 2 GM/50ML SOLUTION: Performed by: INTERNAL MEDICINE

## 2025-01-20 PROCEDURE — 99232 SBSQ HOSP IP/OBS MODERATE 35: CPT | Performed by: INTERNAL MEDICINE

## 2025-01-20 PROCEDURE — 92610 EVALUATE SWALLOWING FUNCTION: CPT

## 2025-01-20 PROCEDURE — 25010000002 ENOXAPARIN PER 10 MG: Performed by: INTERNAL MEDICINE

## 2025-01-20 PROCEDURE — 80048 BASIC METABOLIC PNL TOTAL CA: CPT | Performed by: INTERNAL MEDICINE

## 2025-01-20 PROCEDURE — 83735 ASSAY OF MAGNESIUM: CPT | Performed by: INTERNAL MEDICINE

## 2025-01-20 PROCEDURE — 85027 COMPLETE CBC AUTOMATED: CPT | Performed by: INTERNAL MEDICINE

## 2025-01-20 PROCEDURE — 63710000001 INSULIN REGULAR HUMAN PER 5 UNITS: Performed by: INTERNAL MEDICINE

## 2025-01-20 PROCEDURE — 85520 HEPARIN ASSAY: CPT

## 2025-01-20 RX ORDER — IBUPROFEN 600 MG/1
1 TABLET ORAL
Status: DISCONTINUED | OUTPATIENT
Start: 2025-01-20 | End: 2025-01-27

## 2025-01-20 RX ORDER — DEXTROSE MONOHYDRATE 25 G/50ML
25 INJECTION, SOLUTION INTRAVENOUS
Status: DISCONTINUED | OUTPATIENT
Start: 2025-01-20 | End: 2025-01-27

## 2025-01-20 RX ORDER — MAGNESIUM SULFATE HEPTAHYDRATE 40 MG/ML
2 INJECTION, SOLUTION INTRAVENOUS
Status: COMPLETED | OUTPATIENT
Start: 2025-01-20 | End: 2025-01-20

## 2025-01-20 RX ORDER — ENOXAPARIN SODIUM 150 MG/ML
120 INJECTION SUBCUTANEOUS EVERY 12 HOURS SCHEDULED
Status: DISCONTINUED | OUTPATIENT
Start: 2025-01-20 | End: 2025-01-29

## 2025-01-20 RX ADMIN — ONDANSETRON 4 MG: 2 INJECTION INTRAMUSCULAR; INTRAVENOUS at 18:08

## 2025-01-20 RX ADMIN — Medication 1 APPLICATION: at 20:41

## 2025-01-20 RX ADMIN — INSULIN GLARGINE 30 UNITS: 100 INJECTION, SOLUTION SUBCUTANEOUS at 20:40

## 2025-01-20 RX ADMIN — MAGNESIUM SULFATE HEPTAHYDRATE 2 G: 2 INJECTION, SOLUTION INTRAVENOUS at 10:31

## 2025-01-20 RX ADMIN — HYDROCODONE BITARTRATE AND ACETAMINOPHEN 1 TABLET: 5; 325 TABLET ORAL at 20:49

## 2025-01-20 RX ADMIN — INSULIN HUMAN 10 UNITS: 100 INJECTION, SOLUTION PARENTERAL at 18:08

## 2025-01-20 RX ADMIN — INSULIN HUMAN 14.3 UNITS/HR: 1 INJECTION, SOLUTION INTRAVENOUS at 07:01

## 2025-01-20 RX ADMIN — GABAPENTIN 300 MG: 300 CAPSULE ORAL at 13:22

## 2025-01-20 RX ADMIN — HEPARIN SODIUM 25 UNITS/KG/HR: 10000 INJECTION, SOLUTION INTRAVENOUS at 07:00

## 2025-01-20 RX ADMIN — ACETAMINOPHEN 650 MG: 650 SOLUTION ORAL at 04:59

## 2025-01-20 RX ADMIN — MEROPENEM 1000 MG: 1 INJECTION, POWDER, FOR SOLUTION INTRAVENOUS at 20:40

## 2025-01-20 RX ADMIN — HYDROCODONE BITARTRATE AND ACETAMINOPHEN 1 TABLET: 5; 325 TABLET ORAL at 01:02

## 2025-01-20 RX ADMIN — BUMETANIDE 1 MG: 0.25 INJECTION INTRAMUSCULAR; INTRAVENOUS at 08:59

## 2025-01-20 RX ADMIN — GABAPENTIN 300 MG: 300 CAPSULE ORAL at 23:15

## 2025-01-20 RX ADMIN — ONDANSETRON 4 MG: 2 INJECTION INTRAMUSCULAR; INTRAVENOUS at 01:02

## 2025-01-20 RX ADMIN — NYSTATIN: 100000 POWDER TOPICAL at 23:15

## 2025-01-20 RX ADMIN — MAGNESIUM SULFATE HEPTAHYDRATE 2 G: 2 INJECTION, SOLUTION INTRAVENOUS at 08:59

## 2025-01-20 RX ADMIN — MEROPENEM 1000 MG: 1 INJECTION, POWDER, FOR SOLUTION INTRAVENOUS at 13:21

## 2025-01-20 RX ADMIN — HYDROCODONE BITARTRATE AND ACETAMINOPHEN 1 TABLET: 5; 325 TABLET ORAL at 08:59

## 2025-01-20 RX ADMIN — PROCHLORPERAZINE EDISYLATE 5 MG: 5 INJECTION INTRAMUSCULAR; INTRAVENOUS at 04:58

## 2025-01-20 RX ADMIN — INSULIN GLARGINE 30 UNITS: 100 INJECTION, SOLUTION SUBCUTANEOUS at 13:22

## 2025-01-20 RX ADMIN — INSULIN HUMAN 4 UNITS: 100 INJECTION, SOLUTION PARENTERAL at 18:08

## 2025-01-20 RX ADMIN — GABAPENTIN 300 MG: 300 CAPSULE ORAL at 05:01

## 2025-01-20 RX ADMIN — Medication 1 APPLICATION: at 08:59

## 2025-01-20 RX ADMIN — ENOXAPARIN SODIUM 120 MG: 120 INJECTION SUBCUTANEOUS at 13:21

## 2025-01-20 RX ADMIN — MEROPENEM 1000 MG: 1 INJECTION, POWDER, FOR SOLUTION INTRAVENOUS at 03:45

## 2025-01-20 RX ADMIN — MAGNESIUM SULFATE HEPTAHYDRATE 2 G: 2 INJECTION, SOLUTION INTRAVENOUS at 13:22

## 2025-01-20 RX ADMIN — NYSTATIN: 100000 POWDER TOPICAL at 08:59

## 2025-01-20 RX ADMIN — INSULIN HUMAN 10 UNITS: 100 INJECTION, SOLUTION PARENTERAL at 13:21

## 2025-01-20 NOTE — PROGRESS NOTES
INTENSIVIST   PROGRESS NOTE     Hospital:  LOS: 18 days     Ms. Natalia Arzate, 39 y.o. female is followed for a Chief Complaint of: Respiratory Failure      Subjective   S     Interval History:  No acute events overnight.        The patient's relevant past medical, surgical and social history were reviewed and updated in Epic as appropriate.      ROS:   Constitutional: Negative for fever.   Respiratory: Negative for dyspnea.   Cardiovascular: Negative for chest pain.   Gastrointestinal: Negative for  nausea, vomiting and diarrhea.     Objective   O     Vitals:  Temp  Min: 98.1 °F (36.7 °C)  Max: 98.6 °F (37 °C)  BP  Min: 130/67  Max: 172/100  Pulse  Min: 77  Max: 108  Resp  Min: 18  Max: 22  SpO2  Min: 90 %  Max: 99 % Flow (L/min) (Oxygen Therapy)  Min: 0.5  Max: 1.5    Intake/Ouptut 24 hrs (7:00AM - 6:59 AM)  Intake & Output (last 3 days)         01/17 0701  01/18 0700 01/18 0701  01/19 0700 01/19 0701  01/20 0700 01/20 0701  01/21 0700    I.V. (mL/kg) 2008.1 (12.1) 1932.7 (11.6) 1832.1 (10.8)     Other 353 360 425 281    NG/ 359 320 256    IV Piggyback 1000 750 500     Total Intake(mL/kg) 4337.1 (26.1) 3401.7 (20.5) 3077.1 (18.2) 537 (3.2)    Urine (mL/kg/hr) 3430 (0.9) 2790 (0.7) 3950 (1) 900 (0.6)    Emesis/NG output 0       Stool 0 0 0     Total Output 3430 2790 3950 900    Net +907.1 +611.7 -872.9 -363            Stool Unmeasured Occurrence 2 x 4 x 5 x     Emesis Unmeasured Occurrence 1 x               Medications (drips):         Physical Examination  Telemetry:  Normal sinus rhythm.    Constitutional:  No acute distress. Obese  Resting in bed on 1L nasal cannula.    Eyes: No scleral icterus.   PERRL, EOM intact.    Neck:  Supple, FROM   Cardiovascular: Normal rate, regular and rhythm. Normal heart sounds.  No murmurs, gallop or rub.   Respiratory: No respiratory distress. Normal respiratory effort.  Diminished.    Abdominal:  Soft. No masses. Nontender. No distension. No HSM.   Extremities: No  digital cyanosis. No clubbing.  1+ peripheral edema.   Skin: No rashes, lesions or ulcers   Neurological:   Alert and Oriented to person, place, and time.             Results from last 7 days   Lab Units 01/20/25  0658 01/19/25  0431 01/18/25  0431   WBC 10*3/mm3 4.82 5.15 4.84   HEMOGLOBIN g/dL 8.9* 8.1* 8.2*   MCV fL 93.5 96.4 94.6   PLATELETS 10*3/mm3 210 184 144     Results from last 7 days   Lab Units 01/20/25  0658 01/19/25  0431 01/18/25  0431 01/17/25  1325 01/17/25  0526   SODIUM mmol/L 131* 134* 138 135* 137   POTASSIUM mmol/L 3.7 3.9 3.8  3.8 3.5 3.7   CO2 mmol/L 30.0* 30.0* 30.0* 31.0* 29.0   CREATININE mg/dL 0.61 0.66 0.66 0.71 0.71   GLUCOSE mg/dL 194* 164* 150* 138* 177*   MAGNESIUM mg/dL 1.3* 1.5* 1.7 2.0 1.4*   PHOSPHORUS mg/dL  --   --  4.3 3.8 3.6     Estimated Creatinine Clearance: 197.4 mL/min (by C-G formula based on SCr of 0.61 mg/dL).  Results from last 7 days   Lab Units 01/18/25  0431 01/17/25  1325 01/17/25  0526   ALK PHOS U/L 124* 136* 129*   BILIRUBIN mg/dL 0.3 0.3 0.3   ALT (SGPT) U/L 19 16 16   AST (SGOT) U/L 53* 44* 44*       Results from last 7 days   Lab Units 01/17/25  1016 01/16/25  0543 01/14/25  0323   PH, ARTERIAL pH units 7.423 7.336* 7.469*   PCO2, ARTERIAL mm Hg 41.7 45.9* 44.2   PO2 ART mm Hg 109.0* 92.9 71.5*   FIO2 % 45 65 40       Images:  Imaging Results (Last 24 Hours)       Procedure Component Value Units Date/Time    SLP FEES - Fiberoptic Endo Eval Swallow [[id number]] Resulted: 01/20/25 1523     Updated: 01/20/25 1523    Narrative:      This procedure was auto-finalized with no dictation required.               Results: Reviewed.  I reviewed the patient's new laboratory and imaging results.  I independently reviewed the patient's new images.    Medications: Reviewed.    Assessment & Plan   A / P     Ms. Arzate is a 39F with a history of HLD, Hypothyroidism, Afib, PE/DVT, Factor V Leiden mutation, and stroke who presented to [facility] with altered mental status. Labs  there were significant for renal failure, hyperglycemia, and hypercapnic respiratory failure. Imaging consistent with basilar pulmonary infiltrates concerning for pneumonia. She was intubated and required the initiation of vasopressors. She was transferred to [facility] on 1/2/25 for ongoing care     On arrival, she required ongoing mechanical ventilation and vasopressor support. She has been started on Vancomycin, Flagyl and Cefepime. Cultures are significant for MRSA in the sputum along with Gemella sanguinis in the blood      Nephrology has been following for MARIAA with poor urine output and hyperkalemia          On the morning of 1/4/25, she developed worsening hypoxia despite an FiO2 of 100% and PEEP of 16. Imaging showed white out of the left lung. Bronchoscopy was performed with mucopurulent secretions suctioned from the left upper and left lower lobes. Repeat CXR after bronchoscopy showed some improvement in left upper lobe aeration with persistent left lower lobe disease vs pleural effusion     Patient has worsening renal failure and started on CRRT which eventually was transitioned to intermittent hemodialysis and now DC.      Due to persistent fevers bronchoscopy was done on 1/14 with large amount of secretions suctioned out from left lung field.  Central line was also discontinued and PICC line placed.  Arterial line discontinued 1/15. Patient  PICC line was accidentally dislodged.  New left IJ triple-lumen catheter was placed (1/16)     Nutrition:   No diet orders on file  Advance Directives:   Code Status and Medical Interventions: CPR (Attempt to Resuscitate); Full Support   Ordered at: 01/02/25 1952     Code Status (Patient has no pulse and is not breathing):    CPR (Attempt to Resuscitate)     Medical Interventions (Patient has pulse or is breathing):    Full Support       Active Hospital Problems    Diagnosis     **Acute respiratory failure with hypercapnia     Sepsis     Type 2 diabetes mellitus with  hyperglycemia     MARIAA (acute kidney injury)        Assessment / Plan:    Transition Heparin drip back to home regimen of Lovenox.   Transition insulin drip.   ID following and managing antibiotics.   FEES today.   Resume trophic tube feeds as tolerated.   PT/OT  D/c evans catheter  AM labs  Okay to transfer to telemetry.     High level of risk due to:  severe exacerbation of chronic illness and illness with threat to life or bodily function.    Plan of care and goals reviewed during interdisciplinary rounds.  I discussed the patient's findings and my recommendations with patient, family, and nursing staff      Tiffanie You, DO    Intensive Care Medicine and Pulmonary Medicine

## 2025-01-20 NOTE — PROGRESS NOTES
Clinical Nutrition     Patient Name: Natalia Arzate  YOB: 1985  MRN: [id number]  Date of Encounter: 25 13:24 EST  Admission date: 2025  Reason for Visit: MDR, Follow-up protocol, EN    Assessment   Nutrition Assessment   Admission Diagnosis:  Acute respiratory failure [J96.00]    Problem List:    Acute respiratory failure with hypercapnia    MARIAA (acute kidney injury)    Type 2 diabetes mellitus with hyperglycemia    Sepsis      PMH:   She  has a past medical history of A-fib, Abnormal ECG, Anemia, Anxiety, Asthma, Cancer, Depression, Diabetes mellitus, DVT (deep venous thrombosis), Factor 5 Leiden mutation, heterozygous, Fibroid, GERD (gastroesophageal reflux disease), Gout, H/O abdominal abscess, History of sepsis, History of transfusion, Hyperlipidemia, Hypothyroid, Kidney stone, Migraines, Neuropathy, Ovarian cancer (2021), Ovarian cyst, PE (pulmonary embolism), Polycystic ovary syndrome, Preeclampsia, Rh incompatibility, Stroke, TIA (transient ischemic attack), Urinary tract infection, and Varicella.    PSH:  She  has a past surgical history that includes  section; Cholecystectomy; Colonoscopy; Cardiac catheterization; Right oophorectomy; Abdominal surgery; Esophagogastroduodenoscopy; ureteroscopy laser lithotripsy with stent insertion (Left, 10/01/2021); Extracorporeal shock wave lithotripsy (Left, 10/22/2021); Lithotripsy; ureteroscopy laser lithotripsy with stent insertion (Left, 2022); ureteroscopy laser lithotripsy with stent insertion (Left, 2022); Nephrectomy (Left, 10/2022); Hysterectomy (); and Insert Central Line At Bedside (2025).    Substance history: Opioids    Applicable Nutrition History:     ARF/VENT  25    s/p Bronch 25    MARIAA- CRRT 24---stopped 1-10-25    HD done once 25    s/p Bronch 25    Extubated 25    SKIN: L flank -wound, sacrum/coccyx- areas of friction damage, area purple slow to  [place]    Labs    Labs Reviewed: Yes    Results from last 7 days   Lab Units 01/20/25  0658 01/19/25  0431 01/18/25  0431 01/17/25  1325 01/17/25  0526   GLUCOSE mg/dL 194* 164* 150* 138* 177*   BUN mg/dL 14 21* 25* 27* 29*   CREATININE mg/dL 0.61 0.66 0.66 0.71 0.71   SODIUM mmol/L 131* 134* 138 135* 137   CHLORIDE mmol/L 89* 93* 97* 93* 98   POTASSIUM mmol/L 3.7 3.9 3.8  3.8 3.5 3.7   PHOSPHORUS mg/dL  --   --  4.3 3.8 3.6   MAGNESIUM mg/dL 1.3* 1.5* 1.7 2.0 1.4*   ALT (SGPT) U/L  --   --  19 16 16       Results from last 7 days   Lab Units 01/18/25  0431 01/17/25  1325 01/17/25  0526   ALBUMIN g/dL 3.4* 3.6 3.3*   CRP mg/dL  --  4.07*  --    CHOLESTEROL mg/dL  --  147  --    TRIGLYCERIDES mg/dL  --  345*  --        Results from last 7 days   Lab Units 01/20/25  1028 01/20/25  0858 01/20/25  0739 01/20/25  0635 01/20/25  0608 01/20/25  0343   GLUCOSE mg/dL 160* 146* 174* 196* 205* 156*     Lab Results   Lab Value Date/Time    HGBA1C 9.10 (H) 01/01/2025 2133    HGBA1C 9.6 (H) 12/16/2024 1154    HGBA1C 9.9 (H) 10/15/2024 0505                   Medications    Medications Reviewed: yes    Scheduled Meds:bumetanide, 1 mg, Intravenous, Daily  castor oil-balsam peru, 1 Application, Topical, Q12H  enoxaparin, 120 mg, Subcutaneous, Q12H  gabapentin, 300 mg, Nasogastric, Q8H  insulin glargine, 30 Units, Subcutaneous, Q12H  insulin regular, 10 Units, Subcutaneous, Q6H  insulin regular, 4-24 Units, Subcutaneous, Q6H  magnesium sulfate, 2 g, Intravenous, Q2H  meropenem, 1,000 mg, Intravenous, Q8H  nystatin, , Topical, Q12H      Continuous Infusions:insulin, 0-100 Units/hr, Last Rate: 10.5 Units/hr (01/20/25 1030)      PRN Meds:.  acetaminophen    senna-docusate sodium **AND** polyethylene glycol **AND** [DISCONTINUED] bisacodyl **AND** bisacodyl    dextrose    glucagon (human recombinant)    HYDROcodone-acetaminophen    ipratropium    ipratropium-albuterol    Magnesium Standard Dose Replacement - Follow Nurse / BPA Driven  "Protocol    midazolam    ondansetron    Polyvinyl Alcohol-Povidone PF    Potassium Replacement - Follow Nurse / BPA Driven Protocol    prochlorperazine    sodium chloride    sodium chloride    Intake/Ouptut 24 hrs (0701 - 0700)   I&O's Reviewed: yes    Intake & Output (last day)         01/19 0701  01/20 0700 01/20 0701 01/21 0700    I.V. (mL/kg) 1832.1 (10.8)     Other 425 156    NG/ 103    IV Piggyback 500     Total Intake(mL/kg) 3077.1 (18.2) 259 (1.5)    Urine (mL/kg/hr) 3950 (1) 250 (0.2)    Stool 0     Total Output 3950 250    Net -872.9 +9          Stool Unmeasured Occurrence 5 x            Anthropometrics     Height: Height: 162.6 cm (64.02\")64in  Last Filed Weight: Weight: (!) 169 kg (372 lb 14.4 oz) (01/20/25 0600)350lb  Method: Weight Method: Bed scaleest  BMI: BMI (Calculated): 6460    UBW: last MD office weight 336lb    Weight change:  ? 27lb wt gain since January     Weight       Weight (kg) Weight (lbs) Weight Method Visit Report   5/24/2023 129.729 kg  286 lb  Stated  --     127.007 kg  280 lb   --    6/24/2023 127.007 kg  280 lb  Stated     7/6/2023 --  --   --    7/26/2023 129.275 kg  285 lb      7/27/2023 132.904 kg  293 lb   --    8/15/2023 124.739 kg  275 lb  Stated     8/17/2023 127.007 kg  280 lb  Stated     9/10/2023 129.275 kg  285 lb  Stated     10/6/2023 140.161 kg (H)  309 lb (H)   --    11/7/2023 133.811 kg  295 lb  Stated     11/20/2023 133.811 kg  295 lb      11/24/2023 133.358 kg  294 lb      11/26/2023 133.811 kg  295 lb  Stated     1/2/2024 140.161 kg (H)  309 lb (H)  Stated     2/13/2024 145.605 kg (H)  321 lb (H)  Stated     3/12/2024 --  --   --    4/16/2024 142.611 kg (H)  314 lb 6.4 oz (H)   --    4/18/2024 142.429 kg (H)  314 lb (H)  Stated     4/26/2024 138.801 kg (H)  306 lb (H)  Stated     5/1/2024 138.801 kg (H)  306 lb (H)   --    5/6/2024 146.512 kg (H)  323 lb (H)   --    5/13/2024 146.512 kg (H)  323 lb (H)  Stated     7/8/2024 142.883 kg (H)  315 lb (H)  Stated "     7/31/2024 147.691 kg (H)  325 lb 9.6 oz (H)   --    9/22/2024 143.79 kg (H)  317 lb (H)  Stated     12/3/2024 153.588 kg (H)  338 lb 9.6 oz (H)   --    12/16/2024 153.316 kg (H)  338 lb (H)   --     152.409 kg (H)  336 lb (H)   --    1/1/2025 158.759 kg (H)  350 lb (H)  Estimated        Legend:  (H) High  Nutrition Focused Physical Exam     Date:     Unable to perform due to Pt unable to participate at time of visit     Subjective   Reported/Observed/Food/Nutrition Related History:     1-20: per RN: pt had nausea vomiting over weekend, TF decreased to trophic rate pt tolerating, plan for FEES today    Keofeed no longer in small bowel has migrated back into stomach per last KUB    Pt resting in bed, on nasal cannula, is alert, oriented, states she has vertigo, and get sick when she is rolled in the bed    1-16: pt intubated, sedated   + fentanyl, precedex, heparin, insulin    Per RN: pt tolerating TF, had small bm last night    1-14-25: pt intubated, sedated, fiancee at bedside  + fentanyl, precedex, insulin    Per RN: TF not restarted yesterday, unclear why, plan for bronch today    Plan to resume TF s/p Bronch    1-10: pt intubated sedated, remains on CRRT  + fentanyl, heparin    Per RN: pt has been off pressors since last night, is less puffy, able to tolerate turning, has not had a bm, had good bowel sound    Per MD: ok to resume TF    1-8: pt intubated, sedated + fentanyl, versed,levophed 0.06mcg, bicarb @75ml/hr    Per RN: pt had 800ml GRV last night TF on hold, is super acidotic, line placed for dialysis, plan to start CRRT     1-6: pt intubated, sedated, fiancee at bedside  + fentanyl, versed, insulin    Per RN:pt s/p emergent bronch Saturday,  trophic feed started yesterday    Per MD ok to advance TF    1-3: Pt intubated, sedated, fiancee at bedside  + insulin, fentanyl, versed, heparin, amio    Per RN: pt had wide complex rhythm last night, lokelma on hold as K+ wnl, 103 temp    Per MD ok to start TF,   "place keofeed    Needs Assessment   Date: 1-3-25    Height used:Height: 162.6 cm (64.02\")64in  Weights used/ ABW: 336lb/ 153kg   IBW: 120lb/ 55kg    Estimated Calorie needs: ~1500kcal  Method:  22-25Kcals/KG IBW: 1210-1375kcal  Method:  MSJ ABW: 2195kcal  Method:  PSU ABW: 2745kcal    Estimated Protein needs: ~ 138g protein  Method: 2.5g protein per kg IBW: 138g protein  Method: 0.8-1g protein per kg ABW: 122-153g protein    Current Nutrition Prescription     PO: No diet orders on file  Oral Nutrition Supplement:  Intake: N/A    EN: Peptamen Intense VHP  Goal Rate: 75ml  Water Flushes: 25ml  Modular: None  Route: NG   (keofeed now in stomach per KUB 25)  Tube: Small bore    At goal over: 20Hrs/day     Rx will supply:   Goal Volume 1500  mL/day     Flush Volume 500 mL/day     Energy 1500 Kcal/day 100 % Est Need   Protein 138 g/day 100 % Est Need   Fiber 6 g/day     Water in  EN 1260 mL     Total Water 1760 mL     Meet DRI Yes        --------------------------------------------------------------------------  Product/Rate verified at bedside: Yes  Infusing Rate at time of visit: 20ml    Average Delivery from [facility] Day:   Volume 320 mL/day 21  % Goal Vol.   Flush Volume 425 mL/day     Energy  Kcal/day  % Est Need   Protein  g/day  % Est Need   Fiber  g/day     Water in  EN  mL     Total Water  mL     Meet DRI No           Assessment & Plan   Nutrition Diagnosis   Date: 1-3-25  Updated: 25  Problem Inadequate energy intake    Etiology ARF/Extubated   Signs/Symptoms 21% goal volume EN   Status: Active     Goal:   Nutrition to support treatment and Tolerate EN at goal      Nutrition Intervention      Follow treatment progress, Care plan reviewed    FEES pending    If pt fails, consider trial of reglan, or replacing feeding tube into small bowel as she was tolerating this before    Suggest gradually advance TF back to goal rate @75m/hr, free water @25ml/hr    Monitoring/Evaluation:   Per protocol, I&O, " Pertinent labs, Weight, Skin status, GI status, Symptoms, Hemodynamic stability    Maria Del Carmen Matamoros RD  Time Spent: 30min

## 2025-01-20 NOTE — PAYOR COMM NOTE
"Mago Arzate \"Isa\" (39 y.o. Female)       Date of Birth   1985    Social Security Number       Address   [address] [facility] [address] [facility] [address]    Home Phone   [phone]    MRN   [id number]       [facility]    Marital Status                               Admission Date   1/2/25    Admission Type   Urgent    Admitting Provider   Saji De Jesus MD    Attending Provider   Tiffanie You DO    Department, Room/Bed   [facility] 2A ICU, N219/1       Discharge Date       Discharge Disposition       Discharge Destination                                 Attending Provider: Tiffanie You DO    Allergies: Salvia Officinalis, Shellfish Allergy, Toradol [Ketorolac Tromethamine], Tree Nut, Haldol [Haloperidol], Tramadol, Amoxicillin, Penicillins    Isolation: Contact   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), 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)   Code Status: CPR    Ht: 162.6 cm (64.02\")   Wt: 169 kg (372 lb 14.4 oz)    Admission Cmt: None   Principal Problem: Acute respiratory failure with hypercapnia [J96.02]                   Active Insurance as of 1/2/2025       Primary Coverage       Payor Plan Insurance Group Employer/Plan Group    [place]        Payor Plan Address Payor Plan Phone Number Payor Plan Fax Number Effective Dates    [id number]   4/1/2016 - None Entered    [facility] [phone]         Subscriber Name Subscriber Birth Date Member ID       MAGO ARZATE 1985 [id number]                     Emergency Contacts        (Rel.) Home Phone Work Phone Mobile Phone    Faina Arzate (Mother) [phone] -- --    HueyMilan (Son) [phone] -- [phone]              [place]: [facility] [id number] Tax ID [id number]  Insurance Information                  [place] Phone: --    Subscriber: Mago Arzate Subscriber#: [id number]    Group#: -- Precert#: --    " Authorization#: [id number] Effective Date: --          Current Facility-Administered Medications   Medication Dose Route Frequency Provider Last Rate Last Admin    acetaminophen (TYLENOL) 160 MG/5ML oral solution 650 mg  650 mg Nasogastric Q6H PRN David Dumont APRN   650 mg at 01/20/25 0459    sennosides-docusate (PERICOLACE) 8.6-50 MG per tablet 2 tablet  2 tablet Nasogastric BID PRN Vince Toscano MD        And    polyethylene glycol (MIRALAX) packet 17 g  17 g Nasogastric Daily PRN Vince Toscano MD        And    bisacodyl (DULCOLAX) suppository 10 mg  10 mg Rectal Daily PRN Vince Toscano MD        bumetanide (BUMEX) injection 1 mg  1 mg Intravenous Daily Gela Romano MD   1 mg at 01/20/25 0859    castor oil-balsam peru (VENELEX) ointment 1 Application  1 Application Topical Q12H Saji De Jesus MD   1 Application at 01/20/25 0859    dextrose (D50W) (25 g/50 mL) IV injection 25 g  25 g Intravenous Q15 Min PRN Case, Tiffanie V., DO        Enoxaparin Sodium (LOVENOX) syringe 120 mg  120 mg Subcutaneous Q12H Case, Tiffanie V., DO   120 mg at 01/20/25 1321    gabapentin (NEURONTIN) capsule 300 mg  300 mg Nasogastric Q8H Vince Toscano MD   300 mg at 01/20/25 1322    glucagon (GLUCAGEN) injection 1 mg  1 mg Intramuscular Q15 Min PRN Case, Tiffanie V., DO        HYDROcodone-acetaminophen (NORCO) 5-325 MG per tablet 1 tablet  1 tablet Nasogastric Q6H PRN Vince Toscano MD   1 tablet at 01/20/25 0859    insulin glargine (LANTUS, SEMGLEE) injection 30 Units  30 Units Subcutaneous Q12H Case, Tiffanie V., DO   30 Units at 01/20/25 1322    insulin regular (humuLIN R,novoLIN R) injection 10 Units  10 Units Subcutaneous Q6H Case, Tiffanie V., DO   10 Units at 01/20/25 1321    insulin regular (humuLIN R,novoLIN R) injection 4-24 Units  4-24 Units Subcutaneous Q6H Case, Tiffanie V., DO        ipratropium (ATROVENT) nebulizer solution 0.5 mg  0.5 mg Nebulization Q4H PRN Bettie Walter, APRN         ipratropium-albuterol (DUO-NEB) nebulizer solution 3 mL  3 mL Nebulization Q4H PRN Bettie Walter APRN        Magnesium Standard Dose Replacement - Follow Nurse / BPA Driven Protocol   Not Applicable PRN Bettie Walter APRN        magnesium sulfate 2g/50 mL (PREMIX) infusion  2 g Intravenous Q2H Saji De Jesus MD   2 g at 01/20/25 1322    meropenem (MERREM) 1,000 mg in sodium chloride 0.9 % 100 mL MBP  1,000 mg Intravenous Q8H Rj Boyd MD   1,000 mg at 01/20/25 1321    midazolam (VERSED) injection 2 mg  2 mg Intravenous Q1H PRN Vince Toscano MD   2 mg at 01/15/25 0934    nystatin (MYCOSTATIN) powder   Topical Q12H Bettie Walter APRN   Given at 01/20/25 0859    ondansetron (ZOFRAN) injection 4 mg  4 mg Intravenous Q6H PRN David Dumont APRN   4 mg at 01/20/25 0102    Polyvinyl Alcohol-Povidone PF (ARTIFICIAL TEARS) 1.4-0.6 % ophthalmic solution 2 drop  2 drop Both Eyes Q2H PRN Saji De Jesus MD        Potassium Replacement - Follow Nurse / BPA Driven Protocol   Not Applicable PRN Case, Tiffanie V., DO        prochlorperazine (COMPAZINE) injection 5 mg  5 mg Intravenous Q6H PRN David Dumont APRN   5 mg at 01/20/25 0458    sodium chloride 0.9 % flush 10 mL  10 mL Intravenous PRN Saji De Jesus MD        sodium chloride 0.9 % flush 20 mL  20 mL Intravenous PRN aSji De Jesus MD         Lab Results (last 24 hours)       Procedure Component Value Units Date/Time    POC Glucose Once [[id number]]  (Abnormal) Collected: 01/20/25 1327    Specimen: Blood Updated: 01/20/25 1328     Glucose 147 mg/dL     POC Glucose Once [[id number]]  (Abnormal) Collected: 01/20/25 1028    Specimen: Blood Updated: 01/20/25 1039     Glucose 160 mg/dL     POC Glucose Once [[id number]]  (Abnormal) Collected: 01/20/25 0858    Specimen: Blood Updated: 01/20/25 0859     Glucose 146 mg/dL     Heparin Anti-Xa [[id number]]  (Normal) Collected: 01/20/25 0658    Specimen: Blood Updated: 01/20/25 0802      Heparin Anti-Xa (UFH) 0.52 IU/ml     POC Glucose Once [[id number]]  (Abnormal) Collected: 01/20/25 0739    Specimen: Blood Updated: 01/20/25 0741     Glucose 174 mg/dL     Magnesium [[id number]]  (Abnormal) Collected: 01/20/25 0658    Specimen: Blood Updated: 01/20/25 0730     Magnesium 1.3 mg/dL     Basic Metabolic Panel [[id number]]  (Abnormal) Collected: 01/20/25 0658    Specimen: Blood Updated: 01/20/25 0730     Glucose 194 mg/dL      BUN 14 mg/dL      Creatinine 0.61 mg/dL      Sodium 131 mmol/L      Potassium 3.7 mmol/L      Chloride 89 mmol/L      CO2 30.0 mmol/L      Calcium 9.5 mg/dL      BUN/Creatinine Ratio 23.0     Anion Gap 12.0 mmol/L      eGFR 117.5 mL/min/1.73     Narrative:      GFR Categories in Chronic Kidney Disease (CKD)      GFR Category          GFR (mL/min/1.73)    Interpretation  G1                     90 or greater         Normal or high (1)  G2                      60-89                Mild decrease (1)  G3a                   45-59                Mild to moderate decrease  G3b                   30-44                Moderate to severe decrease  G4                    15-29                Severe decrease  G5                    14 or less           Kidney failure          (1)In the absence of evidence of kidney disease, neither GFR category G1 or G2 fulfill the criteria for CKD.    eGFR calculation 2021 CKD-EPI creatinine equation, which does not include race as a factor    CBC (No Diff) [[id number]]  (Abnormal) Collected: 01/20/25 0658    Specimen: Blood Updated: 01/20/25 0722     WBC 4.82 10*3/mm3      RBC 3.09 10*6/mm3      Hemoglobin 8.9 g/dL      Hematocrit 28.9 %      MCV 93.5 fL      MCH 28.8 pg      MCHC 30.8 g/dL      RDW 17.4 %      RDW-SD 59.0 fl      MPV 9.9 fL      Platelets 210 10*3/mm3     POC Glucose Once [[id number]]  (Abnormal) Collected: 01/20/25 0635    Specimen: Blood Updated: 01/20/25 0637     Glucose 196 mg/dL     POC Glucose Once [[id number]]  (Abnormal) Collected: 01/20/25  0608    Specimen: Blood Updated: 01/20/25 0610     Glucose 205 mg/dL     POC Glucose Once [[id number]]  (Abnormal) Collected: 01/20/25 0343    Specimen: Blood Updated: 01/20/25 0345     Glucose 156 mg/dL     POC Glucose Once [[id number]]  (Abnormal) Collected: 01/20/25 0134    Specimen: Blood Updated: 01/20/25 0136     Glucose 163 mg/dL     POC Glucose Once [[id number]]  (Abnormal) Collected: 01/19/25 2326    Specimen: Blood Updated: 01/19/25 2327     Glucose 163 mg/dL     Heparin Anti-Xa [[id number]]  (Normal) Collected: 01/19/25 2045    Specimen: Blood Updated: 01/19/25 2151     Heparin Anti-Xa (UFH) 0.65 IU/ml     POC Glucose Once [[id number]]  (Abnormal) Collected: 01/19/25 2121    Specimen: Blood Updated: 01/19/25 2132     Glucose 165 mg/dL     POC Glucose Once [[id number]]  (Abnormal) Collected: 01/19/25 1919    Specimen: Blood Updated: 01/19/25 1922     Glucose 142 mg/dL     Heparin Anti-Xa [[id number]]  (Abnormal) Collected: 01/19/25 1810    Specimen: Blood Updated: 01/19/25 1859     Heparin Anti-Xa (UFH) 0.10 IU/ml     POC Glucose Once [[id number]]  (Abnormal) Collected: 01/19/25 1812    Specimen: Blood Updated: 01/19/25 1813     Glucose 166 mg/dL     POC Glucose Once [[id number]]  (Abnormal) Collected: 01/19/25 1721    Specimen: Blood Updated: 01/19/25 1723     Glucose 153 mg/dL     POC Glucose Once [[id number]]  (Abnormal) Collected: 01/19/25 1620    Specimen: Blood Updated: 01/19/25 1622     Glucose 163 mg/dL     POC Glucose Once [[id number]]  (Abnormal) Collected: 01/19/25 1519    Specimen: Blood Updated: 01/19/25 1521     Glucose 138 mg/dL     Blood Culture - Blood, Hand, Right [[id number]]  (Normal) Collected: 01/14/25 1430    Specimen: Blood from Hand, Right Updated: 01/19/25 1500     Blood Culture No growth at 5 days    Narrative:      Less than seven (7) mL's of blood was collected.  Insufficient quantity may yield false negative results.          Imaging Results (Last 24 Hours)       Procedure Component Value  "Units Date/Time    SLP FEES - Fiberoptic Endo Eval Swallow [[id number]] Resulted: 01/20/25 1352     Updated: 01/20/25 1352          Orders (last 24 hrs)        Start     Ordered    01/21/25 1600  Heparin Anti-Xa LMWH  Timed         01/20/25 1132    01/21/25 0600  Magnesium  Morning Draw         01/20/25 0736    01/20/25 1338  Discontinue Insulin Infusion at Specified Time After Basal Dose Administered  Once        Comments: Discontinue Insulin Infusion at Specified Time After Basal Dose Administered    01/20/25 1140    01/20/25 1338  Discontinue Glucommander IV Insulin Order Set After Transition Complete  Once        Comments: Discontinue Glucommander IV Insulin Order Set After Transition Complete    01/20/25 1140    01/20/25 1336  SLP Consult: Eval & Treat Other, Swallow Disorder; extubated 1/17  Once         01/20/25 1335    01/20/25 1329  POC Glucose Once  PROCEDURE ONCE        Comments: Complete no more than 45 minutes prior to patient eating      01/20/25 1327    01/20/25 1202  SLP FEES - Fiberoptic Endo Eval Swallow  Once         01/20/25 1201    01/20/25 1200  Enoxaparin Sodium (LOVENOX) syringe 120 mg  Every 12 Hours Scheduled         01/20/25 1126    01/20/25 1200  POC Glucose Q6H  Every 6 Hours      Comments: Complete no more than 45 minutes prior to patient eating      01/20/25 1140    01/20/25 1200  insulin glargine (LANTUS, SEMGLEE) injection 30 Units  Every 12 Hours Scheduled         01/20/25 1140    01/20/25 1200  insulin regular (humuLIN R,novoLIN R) injection 10 Units  Every 6 Hours Scheduled         01/20/25 1140    01/20/25 1200  insulin regular (humuLIN R,novoLIN R) injection 4-24 Units  Every 6 Hours Scheduled         01/20/25 1140    01/20/25 1138  Discontinue Glucommander After Transition Complete  Once        Comments: Discontinue Glucommander After Transition Complete  - Click \"Discontinue IV\"  - Click \"Confirm\"    01/20/25 1140    01/20/25 1137  dextrose (D50W) (25 g/50 mL) IV injection 25 g "  Every 15 Minutes PRN         01/20/25 1140    01/20/25 1137  glucagon (GLUCAGEN) injection 1 mg  Every 15 Minutes PRN         01/20/25 1140    01/20/25 1137  Potassium Replacement - Follow Nurse / BPA Driven Protocol  As Needed         01/20/25 1140    01/20/25 1040  POC Glucose Once  PROCEDURE ONCE        Comments: Complete no more than 45 minutes prior to patient eating      01/20/25 1028    01/20/25 0900  POC Glucose Once  PROCEDURE ONCE        Comments: Complete no more than 45 minutes prior to patient eating      01/20/25 0858    01/20/25 0830  magnesium sulfate 2g/50 mL (PREMIX) infusion  Every 2 Hours         01/20/25 0736    01/20/25 0742  POC Glucose Once  PROCEDURE ONCE        Comments: Complete no more than 45 minutes prior to patient eating      01/20/25 0739    01/20/25 0638  POC Glucose Once  PROCEDURE ONCE        Comments: Complete no more than 45 minutes prior to patient eating      01/20/25 0635    01/20/25 0611  POC Glucose Once  PROCEDURE ONCE        Comments: Complete no more than 45 minutes prior to patient eating      01/20/25 0608    01/20/25 0600  Magnesium  Morning Draw         01/19/25 0626    01/20/25 0600  CBC (No Diff)  Morning Draw         01/19/25 1408    01/20/25 0600  Basic Metabolic Panel  Morning Draw         01/19/25 1408    01/20/25 0600  Heparin Anti-Xa  Timed         01/19/25 2156    01/20/25 0345  POC Glucose Once  PROCEDURE ONCE        Comments: Complete no more than 45 minutes prior to patient eating      01/20/25 0343    01/20/25 0137  POC Glucose Once  PROCEDURE ONCE        Comments: Complete no more than 45 minutes prior to patient eating      01/20/25 0134    01/19/25 2328  POC Glucose Once  PROCEDURE ONCE        Comments: Complete no more than 45 minutes prior to patient eating      01/19/25 2326    01/19/25 2133  POC Glucose Once  PROCEDURE ONCE        Comments: Complete no more than 45 minutes prior to patient eating      01/19/25 2121    01/19/25 2008  Heparin  "Anti-Xa  STAT         01/19/25 2008    01/19/25 1923  POC Glucose Once  PROCEDURE ONCE        Comments: Complete no more than 45 minutes prior to patient eating      01/19/25 1919    01/19/25 1814  POC Glucose Once  PROCEDURE ONCE        Comments: Complete no more than 45 minutes prior to patient eating      01/19/25 1812    01/19/25 1800  Heparin Anti-Xa  Timed         01/19/25 0534    01/19/25 1748  SLP Consult: Eval & Treat Other; extubated 1/17  Once,   Status:  Canceled         01/19/25 1749    01/19/25 1724  POC Glucose Once  PROCEDURE ONCE        Comments: Complete no more than 45 minutes prior to patient eating      01/19/25 1721    01/19/25 1700  bumetanide (BUMEX) injection 1 mg  Once         01/19/25 1410    01/19/25 1623  POC Glucose Once  PROCEDURE ONCE        Comments: Complete no more than 45 minutes prior to patient eating      01/19/25 1620    01/19/25 1522  POC Glucose Once  PROCEDURE ONCE        Comments: Complete no more than 45 minutes prior to patient eating      01/19/25 1519    01/17/25 2200  gabapentin (NEURONTIN) capsule 300 mg  Every 8 Hours Scheduled         01/17/25 1453    01/17/25 2000  meropenem (MERREM) 1,000 mg in sodium chloride 0.9 % 100 mL MBP  Every 8 Hours         01/17/25 1230    01/17/25 1543  prochlorperazine (COMPAZINE) injection 5 mg  Every 6 Hours PRN         01/17/25 1543    01/17/25 1453  HYDROcodone-acetaminophen (NORCO) 5-325 MG per tablet 1 tablet  Every 6 Hours PRN         01/17/25 1453    01/17/25 1332  ondansetron (ZOFRAN) injection 4 mg  Every 6 Hours PRN         01/17/25 1332    01/17/25 1045  sennosides-docusate (PERICOLACE) 8.6-50 MG per tablet 2 tablet  2 Times Daily PRN        Placed in \"And\" Linked Group    01/17/25 1038    01/17/25 1045  polyethylene glycol (MIRALAX) packet 17 g  Daily PRN        Placed in \"And\" Linked Group    01/17/25 1038    01/17/25 1037  bisacodyl (DULCOLAX) suppository 10 mg  Daily PRN        Placed in \"And\" Linked Group    01/17/25 " 1038    01/16/25 0900  bumetanide (BUMEX) injection 1 mg  Daily         01/15/25 1125    01/15/25 1300  castor oil-balsam peru (VENELEX) ointment 1 Application  Every 12 Hours Scheduled         01/15/25 1209    01/15/25 0600  PICC Site Care  Weekly      Comments: Dressing Changes to PICC Site Every 7 Days and As Needed    01/14/25 1428    01/15/25 0600  Daily CHG Bath While Central Line in Place  Daily       01/14/25 1428    01/14/25 1639  Magnesium Standard Dose Replacement - Follow Nurse / BPA Driven Protocol  As Needed         01/14/25 1640    01/14/25 1428  sodium chloride 0.9 % flush 10 mL  As Needed         01/14/25 1428    01/14/25 1428  sodium chloride 0.9 % flush 20 mL  As Needed         01/14/25 1428    01/14/25 1400  midazolam (VERSED) injection 2 mg  Every 1 Hour PRN         01/14/25 1400    01/13/25 1630  insulin regular 1 unit/mL in 0.9% sodium chloride (Glucommander)  Titrated        Note to Pharmacy: Upon initiation of Glucommander insulin drip, make sure all other insulin orders and anti-diabetic medications have been discontinued.    01/13/25 1542    01/13/25 1600  POC Glucose Finger Q1H  Every Hour,   Status:  Canceled       01/13/25 1543    01/13/25 1540  Potassium Replacement - Follow Nurse / BPA Driven Protocol  As Needed,   Status:  Discontinued         01/13/25 1542    01/13/25 1540  Treat Hypoglycemia As Recommended By Glucommander™ & Notify Provider of Treatment  As Needed,   Status:  Canceled      Comments: Follow Hypoglycemia Orders As Outlined in Process Instructions (Open Order Report to View Full Instructions)  Notify Provider Any Time Hypoglycemia Treatment is Administered    01/13/25 1542    01/13/25 1540  If Insulin Infusion is Paused - Follow Glucommander Instructions  As Needed,   Status:  Canceled       01/13/25 1542    01/13/25 1540  dextrose (D50W) (25 g/50 mL) IV injection 10-50 mL  Every 15 Minutes PRN,   Status:  Discontinued         01/13/25 1542    01/13/25 1540  glucagon  (GLUCAGEN) injection 1 mg  Every 15 Minutes PRN,   Status:  Discontinued         01/13/25 1542    01/13/25 1215  Linezolid (ZYVOX) 600 mg 300 mL  2 Times Daily         01/13/25 1115    01/10/25 0929  Polyvinyl Alcohol-Povidone PF (ARTIFICIAL TEARS) 1.4-0.6 % ophthalmic solution 2 drop  Every 2 Hours PRN         01/10/25 0929    01/08/25 2215  heparin 32693 units/250 mL (100 units/mL) in 0.45 % NaCl infusion  Titrated,   Status:  Discontinued         01/08/25 2115    01/08/25 2000  Wound Care  2 Times Daily      Comments: Turn q 2 hours.  Apply allevyn dressings to sacrum and heels.    01/08/25 1142    01/08/25 1600  Strict Intake & Output  Every Hour       01/08/25 1507    01/08/25 1508  Daily Weights  Daily       01/08/25 1507    01/07/25 1809  acetaminophen (TYLENOL) 160 MG/5ML oral solution 650 mg  Every 6 Hours PRN         01/07/25 1809    01/03/25 2100  nystatin (MYCOSTATIN) powder  Every 12 Hours Scheduled         01/03/25 1817    01/03/25 1330  Daily Weights  Daily       01/03/25 1329    01/03/25 1329  Nutrition Panel  Weekly       01/03/25 1329    01/03/25 1329  C-reactive Protein  Weekly       01/03/25 1329    01/03/25 1326  Tube Feeding Assessment and I/O  Every Shift       01/03/25 1329    01/02/25 1704  Monitor QTc  Every Shift       01/02/25 1704    01/02/25 1512  Pharmacy to Dose Heparin  Continuous PRN,   Status:  Discontinued         01/02/25 1513    01/02/25 1400  Incentive Spirometry  Every 4 Hours While Awake       01/02/25 1228    01/02/25 1228  ipratropium (ATROVENT) nebulizer solution 0.5 mg  Every 4 Hours PRN         01/02/25 1228    01/02/25 1228  ipratropium-albuterol (DUO-NEB) nebulizer solution 3 mL  Every 4 Hours PRN         01/02/25 1228    01/02/25 1200  Vital Signs Every Hour and Per Hospital Policy Based on Patient Condition  Every Hour       01/02/25 1143    01/02/25 1200  Intake & Output  Every Hour       01/02/25 1143    01/02/25 1144  Daily Weights  Daily       01/02/25 1143     01/02/25 1144  Daily CHG Bath While in Critical Care  Daily      Comments: Use for admission bath and length of critical care stay.    01/02/25 1143    01/02/25 1143  Oral Care - Patient Not on NPPV & Not Intubated  Every Shift       01/02/25 1143    Unscheduled  Jayesh & Document Feeding Tube Depth (in cm)  As Needed       01/03/25 1329    Unscheduled  Verify Feeding Tube Placement Upon Insertion & As Needed  As Needed       01/03/25 1329    Unscheduled  Flush Feeding Tube With 30-50mL Water As Needed  As Needed       01/03/25 1329    Unscheduled  Follow Hypoglycemia Standing Orders For Blood Glucose <70 & Notify Provider of Treatment  As Needed      Comments: Follow Hypoglycemia Orders As Outlined in Process Instructions (Open Order Report to View Full Instructions)  Notify Provider Any Time Hypoglycemia Treatment is Administered    01/07/25 1349    Unscheduled  Follow Hypoglycemia Standing Orders For Blood Glucose <70 & Notify Provider of Treatment  As Needed      Comments: Follow Hypoglycemia Orders As Outlined in Process Instructions (Open Order Report to View Full Instructions)  Notify Provider Any Time Hypoglycemia Treatment is Administered    01/20/25 1140    --  vitamin B-12 (CYANOCOBALAMIN) 1000 MCG tablet  Daily         01/03/25 1406    --  DULoxetine (CYMBALTA) 30 MG capsule  Daily         01/03/25 1407    --  DULoxetine (CYMBALTA) 30 MG capsule  Nightly         01/03/25 1407    --  metoprolol succinate XL (TOPROL-XL) 50 MG 24 hr tablet  2 Times Daily         01/03/25 1408    --  multivitamin with minerals (MULTIVITAMIN WOMEN PO)  Daily         01/03/25 1410    --  Enoxaparin Sodium (LOVENOX) 100 MG/ML solution prefilled syringe syringe  Every 12 Hours Scheduled         01/03/25 1410    Signed and Held  albumin human 25 % IV SOLN 12.5 g  As Needed         Signed and Held    --  Insulin Lispro (HumaLOG KwikPen) 200 UNIT/ML solution pen-injector         01/16/25 1202                  Operative/Procedure  "Notes (last 24 hours)  Notes from 25 1436 through 25 1436   No notes of this type exist for this encounter.          Physician Progress Notes (last 24 hours)        Rj Boyd MD at 25 1103            INFECTIOUS DISEASES  INPATIENT PROGRESS NOTE  2025      PATIENT NAME: Natalia Arzate  :  1985  MRN:  [id number]  Date of Admission:  2025      Antimicrobials:  Linezolid - started 25 - stopped 25  Cefepime - started 25 - changed to meropenem 25      MAR reviewed.      Reason for consultation:  fevers, MRSA and ESBL Klebsiella pneumonia    Interval history:  Patient remains in ICU.  Doing well.  No fevers.  Down to 1-2 L NC.  Breathing more comfortably.  Finished with 7 days linezolid.  Tolerating meropenem well.    ROS:  as noted      Objective:  Temp (24hrs), Av.2 °F (36.8 °C), Min:97.6 °F (36.4 °C), Max:98.4 °F (36.9 °C)    /91   Pulse 95   Temp 98.1 °F (36.7 °C) (Bladder)   Resp 20   Ht 162.6 cm (64.02\")   Wt (!) 169 kg (372 lb 14.4 oz)   LMP  (LMP Unknown) Comment: last menses 6 years ago (HCG negative today)  SpO2 96%   BMI 63.98 kg/m²            Physical Examination:  GENERAL: Acutely on chronically ill-appearing female.  Obese.  Awake, improved.  LINES: Left IJ CVL.  HEENT: Normocephalic, atraumatic.  Corpak in place.  CV: RRR. No murmur, rubs, gallops.  Normal S1S2.  LUNGS: Clear to auscultation bilaterally without wheezing, rales, rhonchi. Normal respiratory effort.  Decreased BS B.  ABDOMEN: Positive bowel sounds.  Soft, nontender, nondistended.  No rebound or guarding.  Large pannus.    EXT:  No clubbing, cyanosis, or edema.  :  +Urena catheter, draining clear yellow urine.  SKIN: Warm and dry without rash or ulcer.  NEURO: awake and alert    Laboratory Data:    Results from last 7 days   Lab Units 25  0658 25  0431 25  0431   WBC 10*3/mm3 4.82 5.15 4.84   HEMOGLOBIN g/dL 8.9* 8.1* 8.2*   HEMATOCRIT % 28.9* " 26.7* 26.5*   PLATELETS 10*3/mm3 210 184 144     Results from last 7 days   Lab Units 01/20/25  0658   SODIUM mmol/L 131*   POTASSIUM mmol/L 3.7   CHLORIDE mmol/L 89*   CO2 mmol/L 30.0*   BUN mg/dL 14   CREATININE mg/dL 0.61   GLUCOSE mg/dL 194*   CALCIUM mg/dL 9.5     Results from last 7 days   Lab Units 01/18/25  0431   ALK PHOS U/L 124*   BILIRUBIN mg/dL 0.3   ALT (SGPT) U/L 19   AST (SGOT) U/L 53*         Results from last 7 days   Lab Units 01/17/25  1325   CRP mg/dL 4.07*       Estimated Creatinine Clearance: 197.4 mL/min (by C-G formula based on SCr of 0.61 mg/dL).                          Microbiology:    Microbiology Results (last 10 days)       Procedure Component Value - Date/Time    Respiratory Culture - Sputum, ET Suction [[id number]]  (Abnormal) Collected: 01/14/25 1857    Lab Status: Final result Specimen: Sputum from ET Suction Updated: 01/17/25 1221     Respiratory Culture Light growth (2+) Klebsiella pneumoniae ESBL     Comment:   Consider infectious disease consult.  Susceptibility results may not correlate to clinical outcomes.         Light growth (2+) Staphylococcus aureus, MRSA     Comment:   Considering site of infection and appropriate dosing, oxacillin (or cefoxitin) results can be applied to cefazolin, nafcillin, ampicillin/sulbactam, dicloxacillin, amoxicillin/clavulanate, cephalexin, and cefpodoxime.  Methicillin resistant Staphylococcus aureus, Patient may be an isolation risk.        Gram Stain Many (4+) WBCs per low power field      Rare (1+) Epithelial cells per low power field      Few (2+) Gram positive cocci in pairs, chains and clusters    Narrative:      Refer to [facility] Wash culture for MICS.     Respiratory Culture - Wash, Lung, Left Lower Lobe [[id number]]  (Abnormal)  (Susceptibility) Collected: 01/14/25 1608    Lab Status: Final result Specimen: Wash from Lung, Left Lower Lobe Updated: 01/17/25 1221     Respiratory Culture Light growth (2+) Klebsiella pneumoniae ESBL     Comment:    Consider infectious disease consult.  Susceptibility results may not correlate to clinical outcomes.         Light growth (2+) Staphylococcus aureus, MRSA     Comment:   Considering site of infection and appropriate dosing, oxacillin (or cefoxitin) results can be applied to cefazolin, nafcillin, ampicillin/sulbactam, dicloxacillin, amoxicillin/clavulanate, cephalexin, and cefpodoxime.  Methicillin resistant Staphylococcus aureus, Patient may be an isolation risk.         Rare growth Normal Respiratory Margo     Gram Stain Many (4+) WBCs per low power field      Rare (1+) Epithelial cells per low power field      Rare (1+) Gram positive cocci in pairs and clusters    Susceptibility        Klebsiella pneumoniae ESBL      KELSIE      Ciprofloxacin Resistant      Ertapenem Susceptible      Levofloxacin Resistant      Meropenem Susceptible      Tetracycline Resistant      Trimethoprim + Sulfamethoxazole Resistant                       Susceptibility        Staphylococcus aureus, MRSA      KELSIE      Clindamycin Resistant      Linezolid Susceptible      Oxacillin Resistant      Tetracycline Susceptible      Trimethoprim + Sulfamethoxazole Susceptible      Vancomycin Susceptible                       Susceptibility Comments       Klebsiella pneumoniae ESBL    With the exception of urinary-sourced infections, aminoglycosides should not be used as monotherapy.      Staphylococcus aureus, MRSA    This isolate is presumed to be clindamycin resistant based on detection of inducible clindamycin resistance.  Clindamycin may still be effective in some patients.  This isolate is presumed to be clindamycin resistant based on detection of inducible clindamycin resistance.  Clindamycin may still be effective in some patients.               Blood Culture - Blood, Blood, Arterial Line [[id number]]  (Abnormal) Collected: 01/14/25 6522    Lab Status: Final result Specimen: Blood, Arterial Line Updated: 01/17/25 5457     Blood Culture  Staphylococcus, coagulase negative     Isolated from Anaerobic Bottle     Gram Stain Anaerobic Bottle Gram positive cocci in pairs and clusters    Narrative:      Probable contaminant requires clinical correlation, susceptibility not performed unless requested by physician.      Blood Culture ID, PCR - Blood, Blood, Arterial Line [[id number]]  (Abnormal) Collected: 01/14/25 1434    Lab Status: Final result Specimen: Blood, Arterial Line Updated: 01/16/25 0657     BCID, PCR Staph spp, not aureus or lugdunensis. Identification by BCID2 PCR.     BOTTLE TYPE Anaerobic Bottle    Blood Culture - Blood, Hand, Right [[id number]]  (Normal) Collected: 01/14/25 1430    Lab Status: Final result Specimen: Blood from Hand, Right Updated: 01/19/25 1500     Blood Culture No growth at 5 days    Narrative:      Less than seven (7) mL's of blood was collected.  Insufficient quantity may yield false negative results.                Radiology:  XR Chest 1 View    Result Date: 1/19/2025  Impression: 1. Improving pulmonary vascular congestion 2. Persistent left basilar airspace disease and small left pleural effusion Electronically Signed: David Aparicio  1/19/2025 9:30 AM EST  Workstation ID: [id number]         DISCUSSION:  39 y.o. female with history of factor V Leiden, obesity, and uncontrolled type 2 diabetes admitted with hyperglycemia.   Extended intubation with 14-day hospitalization, now with fever.     PROBLEM LIST:   -- Fever, hospital onset - improving on linezolid/cefepime.  With prolonged ICU stay at 2 weeks with new fever, suspect nosocomial infection.  Consider central line associated bloodstream infection, catheter associated urinary tract infection, ventilator associated pneumonia.  UA not collected.  Blood cultures with one bottle CoNS, likely contaminant.  Prior CVL removed.  BAL culture MRSA and ESBL Klebsiella pneumoniae, c/w HAP/VAP.  -- Acute hypoxic respiratory failure, intubated since 1/1/2025.    -- Bibasilar  pulmonary infiltrates in the setting of fever and extended intubation, risk for ventilator associated pneumonia.  -- Indwelling Urena catheter, risk for catheter associated UTI.  -- Right IJ central line in place from outside ER, risk for CLABSI.  -- MRSA pneumonia, previously treated with vancomycin for 5 days, currently with linezolid.  -- Positive blood cultures.  Blood cultures x 2 from 1/1/2025 with 1 bottle positive for Gemella sanguinis and 1 bottle and the other set positive for coagulase-negative Staphylococcus.  CoNS likely contaminant.  Unclear significance for Gemella species - was treated with 10 days aminopenicillin (ampicillin followed by Unasyn).  Repeat blood cultures were no growth.  -- Obesity.  Skin appears intact at the fold without evidence for breakdown or infection.  -- Uncontrolled type 2 diabetes, contributing to propensity for infection and poor healing.  -- Factor V Leiden.  -- Listed penicillin allergy but tolerated ampicillin.    PLAN:  -- s/p 7 days linezolid for MRSA HAP  -- Continue meropenem for ESBL Klebsiella pneumoniae through 1/22/25  -- Monitoring vitals, exam, and labs - much improved  -- Monitoring for adverse reactions to antimicrobials    Discussed with patient.      Rj Boyd MD  1/20/2025  11:04 EST      Electronically signed by Rj Boyd MD at 01/20/25 1107       Consult Notes (last 24 hours)  Notes from 01/19/25 1436 through 01/20/25 1436   No notes of this type exist for this encounter.

## 2025-01-20 NOTE — CASE MANAGEMENT/SOCIAL WORK
Continued Stay Note  [facility]     Patient Name: Natalia Arzate  MRN: [id number]  Today's Date: 1/20/2025    Admit Date: 1/2/2025    Plan: TBD, inpatient rehab   Discharge Plan       Row Name 01/20/25 1600       Plan    Plan TBD, inpatient rehab    Patient/Family in Agreement with Plan yes    Plan Comments Discussed in mdR, patient was extubated on 1/17. Speech following currently on tube feeds. Therapy recs over w/e inpatient rehab. I met w/patient and her significant other in room, she is agreeable. She requested referral to [facility] [place] [facility]. I chat messaged Jimmie cardensa/[facility] [place] [facility] to follow. Tele orders. CM will continue to follow.    Final Discharge Disposition Code 62 - inpatient rehab facility                   Discharge Codes    No documentation.                 Expected Discharge Date and Time       Expected Discharge Date Expected Discharge Time    Jan 17, 2025               Rj Valle RN

## 2025-01-20 NOTE — PLAN OF CARE
Goal Outcome Evaluation:                   Anticipated Discharge Disposition (SLP): inpatient rehabilitation facility          SLP Swallowing Diagnosis: severe, pharyngeal dysphagia (01/20/25 1821)

## 2025-01-20 NOTE — MBS/VFSS/FEES
Acute Care - Speech Language Pathology   Swallow Initial Evaluation [facility]  Fiberoptic Endoscopic Evaluation of Swallowing (FEES)       Patient Name: Natalia Arzate  : 1985  MRN: [id number]  Today's Date: 2025               Admit Date: 2025    Visit Dx:     ICD-10-CM ICD-9-CM   1. Respiratory acidosis with metabolic acidosis  E87.4 276.3   2. Acute respiratory failure with hypoxia  J96.01 518.81   3. MARIAA (acute kidney injury)  N17.9 584.9   4. Pharyngeal dysphagia  R13.13 787.23     Patient Active Problem List   Diagnosis    Nephrolithiasis    Ovarian teratoma, right    Other chronic pain    Pelvic pain    History of DVT in adulthood    History of pulmonary embolism    Ureteral calculus    Ischemic cerebrovascular accident (CVA)    Primary hypertension    Left lumbar radiculopathy    PTSD (post-traumatic stress disorder)    MARIAA (acute kidney injury)    Anemia    Dyslipidemia    Hypothyroid    Other secondary gout, multiple sites    Factor 5 Leiden mutation, heterozygous    Vitamin D deficiency    Vitamin B 12 deficiency    Type 2 diabetes mellitus with hyperglycemia    Hoarseness, persistent    Ureterolithiasis    Acute cystitis without hematuria    Hepatic steatosis    Right flank pain, chronic    Detrusor instability of bladder    Frequency of micturition    Acute respiratory failure with hypercapnia    Hyperkalemia    Sepsis    Respiratory acidosis with metabolic acidosis    Acute respiratory failure     Past Medical History:   Diagnosis Date    A-fib     Abnormal ECG     Anemia     Anxiety     Asthma     Cancer     Ovarian    Depression     Diabetes mellitus     DVT (deep venous thrombosis)     Factor 5 Leiden mutation, heterozygous     Fibroid     GERD (gastroesophageal reflux disease)     Gout     H/O abdominal abscess     History of sepsis     History of transfusion     Hyperlipidemia     Hypothyroid     Kidney stone     Migraines     Neuropathy     Ovarian cancer 2021     Ovarian cyst     PE (pulmonary embolism)     Polycystic ovary syndrome     Preeclampsia     Rh incompatibility     Stroke     TIA (transient ischemic attack)     Urinary tract infection     Varicella      Past Surgical History:   Procedure Laterality Date    ABDOMINAL SURGERY      CARDIAC CATHETERIZATION       SECTION      CHOLECYSTECTOMY      COLONOSCOPY      ENDOSCOPY      EXTRACORPOREAL SHOCK WAVE LITHOTRIPSY (ESWL) Left 10/22/2021    Procedure: EXTRACORPOREAL SHOCKWAVE LITHOTRIPSY;  Surgeon: Milan Motley MD;  Location: [facility] OR;  Service: Urology;  Laterality: Left;    HYSTERECTOMY  2017    ovarian ca    INSERT CENTRAL LINE AT BEDSIDE  2025    LITHOTRIPSY      NEPHRECTOMY Left 10/2022    RIGHT OOPHORECTOMY      URETEROSCOPY LASER LITHOTRIPSY WITH STENT INSERTION Left 10/01/2021    Procedure: URETEROSCOPY WITH STENT PLACEMENT;  Surgeon: Milan Motley MD;  Location: [facility] OR;  Service: Urology;  Laterality: Left;    URETEROSCOPY LASER LITHOTRIPSY WITH STENT INSERTION Left 2022    Procedure: URETEROSCOPY STENT REMOVAL;  Surgeon: Milan Motley MD;  Location:  [facility] OR;  Service: Urology;  Laterality: Left;    URETEROSCOPY LASER LITHOTRIPSY WITH STENT INSERTION Left 2022    Procedure: CYSTOSCOPY RETROGRADE LEFT WITH STENT PLACEMENT;  Surgeon: Milan Motley MD;  Location: [facility] OR;  Service: Urology;  Laterality: Left;       SLP Recommendation and Plan  SLP Swallowing Diagnosis: severe, pharyngeal dysphagia (25 1320)  SLP Diet Recommendation: NPO, temporary alternate methods of nutrition/hydration, other (see comments) (Ok for 3-4 ice chips/hr after oral care per RN discretion) (25 1320)  Recommended Precautions and Strategies: general aspiration precautions (25 1320)  SLP Rec. for Method of Medication Administration: meds via alternate route (25 1320)     Monitor for Signs of Aspiration: yes, notify SLP if any concerns  (01/20/25 1320)  Recommended Diagnostics: reassess via FEES (01/20/25 1105)  Swallow Criteria for Skilled Therapeutic Interventions Met: demonstrates skilled criteria (01/20/25 1320)  Anticipated Discharge Disposition (SLP): inpatient rehabilitation facility (01/20/25 1320)  Rehab Potential/Prognosis, Swallowing: good, to achieve stated therapy goals (01/20/25 1320)  Therapy Frequency (Swallow): 5 days per week (01/20/25 1320)  Predicted Duration Therapy Intervention (Days): 1 week (01/20/25 1320)  Oral Care Recommendations: Oral Care BID/PRN, Suction toothbrush (01/20/25 1320)                                               SWALLOW EVALUATION (Last 72 Hours)       SLP Adult Swallow Evaluation       Row Name 01/20/25 1320 01/20/25 1105                Rehab Evaluation    Document Type evaluation  - evaluation  -       Subjective Information no complaints  - no complaints  -       Patient Observations alert;cooperative  - alert;cooperative  -       Patient/Family/Caregiver Comments/Observations family present  - Family present  -       Patient Effort good  - good  -       Symptoms Noted During/After Treatment none  - none  -          General Information    Patient Profile Reviewed yes  - yes  -       Pertinent History Of Current Problem See initial eval, pt referred for FEES  - Adm w/ MRSA PNA &  worsening renal failure, requiring CRRT. Intubated 1/1-1/17. Hx: HLD, hypothyroidism, a-fib, PE/DVT, Factor V Leiden mutation, CVA. CXR: improving vascular congestion & persistent L basilar atelectasis w/ small L pleural effusion  -       Current Method of Nutrition NPO;nasogastric feedings  - NPO;nasogastric feedings  -       Precautions/Limitations, Vision WFL;for purposes of eval  - WFL;for purposes of eval  -       Precautions/Limitations, Hearing WFL;for purposes of eval  - WFL;for purposes of eval  -       Prior Level of Function-Communication unknown  - unknown  -        Prior Level of Function-Swallowing other (see comments)  per chart, pt w/ remote hx pharyngeal dysphagia in 2022  - other (see comments)  per chart, pt w/ remote hx pharyngeal dysphagia in 2022  [facility]       Plans/Goals Discussed with patient;family;agreed upon  - patient;family;agreed upon  [facility]       Barriers to Rehab medically complex  - medically complex  -       Patient's Goals for Discharge patient did not state  - patient did not state  -       Family Goals for Discharge family did not state  - family did not state  [facility]          Pain    Additional Documentation Pain Scale: FACES Pre/Post-Treatment (Group)  - Pain Scale: FACES Pre/Post-Treatment (Group)  -          Pain Scale: FACES Pre/Post-Treatment    Pain: FACES Scale, Pretreatment 0-->no hurt  - 0-->no hurt  -       Posttreatment Pain Rating 0-->no hurt  - 0-->no hurt  -          Oral Motor Structure and Function    Secretion Management -- WNL/WFL  -       Mucosal Quality -- moist, healthy  -          Oral Musculature and Cranial Nerve Assessment    Oral Motor General Assessment -- vocal impairment  -       Vocal Impairment, Detail. Cranial Nerve X (Vagus) -- other (see comments)  Hoarse, decreased vocal intensity  -          General Eating/Swallowing Observations    Respiratory Support Currently in Use -- room air  -       Eating/Swallowing Skills -- self-fed;fed by SLP  -       Positioning During Eating -- upright 90 degree;upright in bed  -       Utensils Used -- spoon;cup;straw  -       Consistencies Trialed -- ice chips;thin liquids;nectar/syrup-thick liquids;pureed  -          Respiratory    Date of Intubation -- 1/1-1/17  [facility]          Clinical Swallow Eval    Pharyngeal Phase -- suspected pharyngeal impairment  -       Clinical Swallow Evaluation Summary -- Pt w/ overt s/s of aspiration c/b intermittent cough w/ thin liquid trials. No glaring s/s of aspiration w/ NTL or pureed trials. Although no overt s/s  of aspiration w/ NTL or pureed trials, pt felt to be @ risk of aspiration given prolonged intubation/ prior respiratory status.  (MBS x2 completed @ OSH in 2022, pt initially w/ moderate pharyngeal dysphagia. MBS completed later in adm revealed mild pharyngeal dysphagia w/ recs for regular diet w/ thin liquids) Recommend cont NPO w/ NG. Ok for 3-4 ice chips/hr after oral care. Plan for FEES this PM  -          Pharyngeal Phase Concerns    Pharyngeal Phase Concerns -- cough  -       Cough -- thin  -MH          Fiberoptic Endoscopic Evaluation of Swallowing (FEES)    Risks/Benefits Reviewed risks/benefits explained;patient;family;agreed to eval  - --       Nasal Entry right:  - --       Scope serial number/identification [id number]  - --          Anatomy and Physiology    Anatomic Considerations edema;erythema;vocal folds;false vocal folds;arytenoids  - --       Velopharyngeal CNA  - --       Base of Tongue symmetrical  - --       Epiglottis edematous  - --       Laryngeal Function Breathing symmetrical  - --       Laryngeal Function Phonation symmetrical  - --       Laryngeal Function to Breath Hold TVF/FVF/Arytenoid;can't sustain closure  - --       Secretion Rating Scale (Apollo et al. 1996) 2- secretions initially outside the vestibule but later entered the vestibule  - --       Secretion Description thin;clear  - --       Ice Chips elicited swallow;partially cleared secretions;aspirated;no response to aspiration  - --       Spontaneous Swallow frequency reduced  - --       Sensory sensed scope  - --       Utensils Used Spoon;Cup  - --       Consistencies Trialed spoon;cup;ice chips;thin liquids;honey-thick liquids;pudding/puree  - --          FEES Interpretation    Oral Phase oral residue present;prespill of liquids into pharynx;solids not tested  - --          Initiation of Pharyngeal Swallow    Initiation of Pharyngeal Swallow bolus in pyriform sinuses  - --       Pharyngeal  Phase impaired pharyngeal phase of swallowing  -MH --       Penetration Before the Swallow thin liquids;secondary to reduced back of tongue control;secondary to delayed swallow initiation or mistiming  -MH --       Penetration During the Swallow thin liquids;secondary to reduced vestibular closure;secondary to delayed swallow initiation or mistiming;secondary to reduced laryngeal elevation  -MH --       Penetration After the Swallow honey-thick liquids;pudding/puree;secondary to residue;in valleculae;in pyriform sinuses  -MH --       Aspiration After the Swallow thin liquids;honey-thick liquids;pudding/puree;secondary to residue;in valleculae;in pyriform sinuses;in laryngeal vestibule  -MH --       Depth of Penetration deep  -MH --       Response to Penetration No  -MH --       No spontaneous response to penetration and non-effective laryngeal clearance with cue (see comments);other (see comments)  cued cough  -MH --       Response to Aspiration No  -MH --       No spontaneous response to aspiration with non-effective subglottic clearance with cue (see comments);other (see comments)  cued cough  -MH --       Rosenbek's Scale thin:;honey:;pudding/puree:;8-->Level 8  -MH --       Residue all consistencies tested;diffuse within pharynx;secondary to reduced hyolaryngeal excursion;secondary to reduced laryngeal elevation;secondary to reduced posterior pharyngeal wall stripping;other (see comments)  increased residue w/ thin liquids  -MH --       Response to Residue partial residue clearance;with cued swallow  -MH --       Attempted Compensatory Maneuvers bolus size;bolus presentation style;additional subsequent swallow;multiple swallows  - --       Response to Attempted Compensatory Maneuvers did not prevent penetration;did not prevent aspiration;did not reduce residue  -MH --       Successful Compensatory Maneuver Competency patient able to;demonstrate compensations;with cues  -MH --       FEES Summary Severe  pharyngeal dysphagia. Prespill w/ thins to the pyriforms. Deep penetration w/ thins before &  during the swallow w/ subsequent silent aspiration after the swallow. Cued cough weak and ineffective in clearing. Penetration & evntual silent aspiration of HTL & pudding after the swallow. Suspect fatigue impacting, as pt did not initially aspirate pudding. Diffuse residue w/ all consistenices assessed; increased residue w/ thins. Compensations ineffective in preventing penetration or aspiration. Safest recommendation cont NPO w/ NG, ok for 3-4 ice chips/hr after oral care w/ supervision per RN discretion  - --          SLP Evaluation Clinical Impression    SLP Swallowing Diagnosis severe;pharyngeal dysphagia  - suspected pharyngeal dysphagia  -       Functional Impact risk of aspiration/pneumonia  - risk of aspiration/pneumonia  -       Rehab Potential/Prognosis, Swallowing good, to achieve stated therapy goals  - good, to achieve stated therapy goals  -       Swallow Criteria for Skilled Therapeutic Interventions Met demonstrates skilled criteria  - demonstrates skilled criteria  -          Recommendations    Therapy Frequency (Swallow) 5 days per week  - --       Predicted Duration Therapy Intervention (Days) 1 week  - 1 week  -       SLP Diet Recommendation NPO;temporary alternate methods of nutrition/hydration;other (see comments)  Ok for 3-4 ice chips/hr after oral care per RN discretion  - NPO;temporary alternate methods of nutrition/hydration;other (see comments)  Ok for 3-4 ice chips/hr after oral care as tolerated  -       Recommended Diagnostics -- reassess via FEES  -       Recommended Precautions and Strategies general aspiration precautions  - general aspiration precautions  -       Oral Care Recommendations Oral Care BID/PRN;Suction toothbrush  - Oral Care BID/PRN;Suction toothbrush  -       SLP Rec. for Method of Medication Administration meds via alternate route  -  meds via alternate route  -       Monitor for Signs of Aspiration yes;notify SLP if any concerns  - yes;notify SLP if any concerns  -       Anticipated Discharge Disposition (SLP) inpatient rehabilitation facility  - inpatient rehabilitation facility  -                 User Key  (r) = Recorded By, (t) = Taken By, (c) = Cosigned By      Initials Name Effective Dates    CATY Kassy Whitehead, MS [facility]-SLP 05/12/23 -                     EDUCATION  The patient has been educated in the following areas:   Dysphagia (Swallowing Impairment) Oral Care/Hydration NPO rationale.        SLP GOALS       Row Name 01/20/25 1320             (LTG) Patient will demonstrate progress toward functional swallow for    Diet Texture (Demonstrate progress toward functional swallow) regular textures  -      Liquid viscosity (Demonstrate progress toward functional swallow) thin liquids  -      [place] (Demonstrate progress towards functional swallow) with minimal cues (75-90% accuracy)  -      Time Frame (Demonstrate progress toward functional swallow) 1 week  -      Progress/Outcomes (Demonstrate progress toward functional swallow) new goal  -         ([facility]) Patient will tolerate therapeutic trials of    Consistencies Trialed (Tolerate therapeutic trials) pureed textures;thin liquids  -      Desired Outcome (Tolerate therapeutic trials) without signs/symptoms of aspiration  -      [place] (Tolerate therapeutic trials) with minimal cues (75-90% accuracy)  -      Time Frame (Tolerate therapeutic trials) 1 week  -      Progress/Outcomes (Tolerate therapeutic trials) new goal  -         ([facility]) Lingual Strengthening Goal 1 (SLP)    Activity (Lingual Strengthening Goal 1, SLP) increase lingual tone/sensation/control/coordination/movement;increase tongue back strength  -      Increase Lingual Tone/Sensation/Control/Coordination/Movement lingual resistance exercises;swallow trials  -      Increase Tongue Back  Strength lingual resistance exercises  -      [place]/Accuracy (Lingual Strengthening Goal 1, SLP) with minimal cues (75-90% accuracy)  -      Time Frame (Lingual Strengthening Goal 1, SLP) 1 week  -      Progress/Outcomes (Lingual Strengthening Goal 1, SLP) new goal  -         ([facility]) Pharyngeal Strengthening Exercise Goal 1 (SLP)    Activity (Pharyngeal Strengthening Goal 1, SLP) increase timing;increase superior movement of the hyolaryngeal complex;increase anterior movement of the hyolaryngeal complex;increase closure at entrance to airway/closure of airway at glottis;increase squeeze/positive pressure generation  -      Increase Timing prepping - 3 second prep or suck swallow or 3-step swallow  -      Increase Superior Movement of the Hyolaryngeal Complex effortful pitch glide (falsetto + pharyngeal squeeze)  -      Increase Anterior Movement of the Hyolaryngeal Complex chin tuck against resistance (CTAR)  -      Increase Closure at Entrance to Airway/Closure of Airway at Glottis supraglottic swallow  -      Increase Squeeze/Positive Pressure Generation hard effortful swallow  -      [place]/Accuracy (Pharyngeal Strengthening Goal 1, SLP) with minimal cues (75-90% accuracy)  -      Time Frame (Pharyngeal Strengthening Goal 1, SLP) 1 week  -      Progress/Outcomes (Pharyngeal Strengthening Goal 1, SLP) new goal  -                User Key  (r) = Recorded By, (t) = Taken By, (c) = Cosigned By      Initials Name Provider Type    Kassy Moe, MS CCC-SLP Speech and Language Pathologist                         Time Calculation:    Time Calculation- SLP       Row Name 01/20/25 1535 01/20/25 1340          Time Calculation- SLP    SLP Start Time 1320  - 1105  -     SLP Received On 01/20/25  - 01/20/25  -        Untimed Charges    12650-YL Eval Oral Pharyng Swallow Minutes -- 60  -     40988-MN Fiberoptic Endo Eval Swallow Minutes 95  - --        Total Minutes     Untimed Charges Total Minutes 95  -MH 60  -MH      Total Minutes 95  -MH 60  -MH               User Key  (r) = Recorded By, (t) = Taken By, (c) = Cosigned By      Initials Name Provider Type     Kassy Whitehead MS CCC-SLP Speech and Language Pathologist                    Therapy Charges for Today       Code Description Service Date Service Provider Modifiers Qty    [id number] HC ST EVAL ORAL PHARYNG SWALLOW 4 1/20/2025 Kassy Whitehead MS CCC-SLP GN 1    [id number] HC ST FIBEROPTIC ENDO EVAL SWALL 6 1/20/2025 Kassy Whitehead, MS SOLANO-SLP GN 1                 MS ZENA Alonzo  1/20/2025

## 2025-01-20 NOTE — PROGRESS NOTES
"  INFECTIOUS DISEASES  INPATIENT PROGRESS NOTE  2025      PATIENT NAME: Natalia Arzate  :  1985  MRN:  [id number]  Date of Admission:  2025      Antimicrobials:  Linezolid - started 25 - stopped 25  Cefepime - started 25 - changed to meropenem 25      MAR reviewed.      Reason for consultation:  fevers, MRSA and ESBL Klebsiella pneumonia    Interval history:  Patient remains in ICU.  Doing well.  No fevers.  Down to 1-2 L NC.  Breathing more comfortably.  Finished with 7 days linezolid.  Tolerating meropenem well.    ROS:  as noted      Objective:  Temp (24hrs), Av.2 °F (36.8 °C), Min:97.6 °F (36.4 °C), Max:98.4 °F (36.9 °C)    /91   Pulse 95   Temp 98.1 °F (36.7 °C) (Bladder)   Resp 20   Ht 162.6 cm (64.02\")   Wt (!) 169 kg (372 lb 14.4 oz)   LMP  (LMP Unknown) Comment: last menses 6 years ago (HCG negative today)  SpO2 96%   BMI 63.98 kg/m²            Physical Examination:  GENERAL: Acutely on chronically ill-appearing female.  Obese.  Awake, improved.  LINES: Left IJ CVL.  HEENT: Normocephalic, atraumatic.  Corpak in place.  CV: RRR. No murmur, rubs, gallops.  Normal S1S2.  LUNGS: Clear to auscultation bilaterally without wheezing, rales, rhonchi. Normal respiratory effort.  Decreased BS B.  ABDOMEN: Positive bowel sounds.  Soft, nontender, nondistended.  No rebound or guarding.  Large pannus.    EXT:  No clubbing, cyanosis, or edema.  :  +Urena catheter, draining clear yellow urine.  SKIN: Warm and dry without rash or ulcer.  NEURO: awake and alert    Laboratory Data:    Results from last 7 days   Lab Units 25  0658 25  0431 25  0431   WBC 10*3/mm3 4.82 5.15 4.84   HEMOGLOBIN g/dL 8.9* 8.1* 8.2*   HEMATOCRIT % 28.9* 26.7* 26.5*   PLATELETS 10*3/mm3 210 184 144     Results from last 7 days   Lab Units 25  0658   SODIUM mmol/L 131*   POTASSIUM mmol/L 3.7   CHLORIDE mmol/L 89*   CO2 mmol/L 30.0*   BUN mg/dL 14   CREATININE mg/dL " 0.61   GLUCOSE mg/dL 194*   CALCIUM mg/dL 9.5     Results from last 7 days   Lab Units 01/18/25  0431   ALK PHOS U/L 124*   BILIRUBIN mg/dL 0.3   ALT (SGPT) U/L 19   AST (SGOT) U/L 53*         Results from last 7 days   Lab Units 01/17/25  1325   CRP mg/dL 4.07*       Estimated Creatinine Clearance: 197.4 mL/min (by C-G formula based on SCr of 0.61 mg/dL).                          Microbiology:    Microbiology Results (last 10 days)       Procedure Component Value - Date/Time    Respiratory Culture - Sputum, ET Suction [[id number]]  (Abnormal) Collected: 01/14/25 1857    Lab Status: Final result Specimen: Sputum from ET Suction Updated: 01/17/25 1221     Respiratory Culture Light growth (2+) Klebsiella pneumoniae ESBL     Comment:   Consider infectious disease consult.  Susceptibility results may not correlate to clinical outcomes.         Light growth (2+) Staphylococcus aureus, MRSA     Comment:   Considering site of infection and appropriate dosing, oxacillin (or cefoxitin) results can be applied to cefazolin, nafcillin, ampicillin/sulbactam, dicloxacillin, amoxicillin/clavulanate, cephalexin, and cefpodoxime.  Methicillin resistant Staphylococcus aureus, Patient may be an isolation risk.        Gram Stain Many (4+) WBCs per low power field      Rare (1+) Epithelial cells per low power field      Few (2+) Gram positive cocci in pairs, chains and clusters    Narrative:      Refer to LLL Wash culture for MICS.     Respiratory Culture - Wash, Lung, Left Lower Lobe [[id number]]  (Abnormal)  (Susceptibility) Collected: 01/14/25 1608    Lab Status: Final result Specimen: Wash from Lung, Left Lower Lobe Updated: 01/17/25 1221     Respiratory Culture Light growth (2+) Klebsiella pneumoniae ESBL     Comment:   Consider infectious disease consult.  Susceptibility results may not correlate to clinical outcomes.         Light growth (2+) Staphylococcus aureus, MRSA     Comment:   Considering site of infection and appropriate  dosing, oxacillin (or cefoxitin) results can be applied to cefazolin, nafcillin, ampicillin/sulbactam, dicloxacillin, amoxicillin/clavulanate, cephalexin, and cefpodoxime.  Methicillin resistant Staphylococcus aureus, Patient may be an isolation risk.         Rare growth Normal Respiratory Margo     Gram Stain Many (4+) WBCs per low power field      Rare (1+) Epithelial cells per low power field      Rare (1+) Gram positive cocci in pairs and clusters    Susceptibility        Klebsiella pneumoniae ESBL      KELSIE      Ciprofloxacin Resistant      Ertapenem Susceptible      Levofloxacin Resistant      Meropenem Susceptible      Tetracycline Resistant      Trimethoprim + Sulfamethoxazole Resistant                       Susceptibility        Staphylococcus aureus, MRSA      KELSIE      Clindamycin Resistant      Linezolid Susceptible      Oxacillin Resistant      Tetracycline Susceptible      Trimethoprim + Sulfamethoxazole Susceptible      Vancomycin Susceptible                       Susceptibility Comments       Klebsiella pneumoniae ESBL    With the exception of urinary-sourced infections, aminoglycosides should not be used as monotherapy.      Staphylococcus aureus, MRSA    This isolate is presumed to be clindamycin resistant based on detection of inducible clindamycin resistance.  Clindamycin may still be effective in some patients.  This isolate is presumed to be clindamycin resistant based on detection of inducible clindamycin resistance.  Clindamycin may still be effective in some patients.               Blood Culture - Blood, Blood, Arterial Line [[id number]]  (Abnormal) Collected: 01/14/25 1434    Lab Status: Final result Specimen: Blood, Arterial Line Updated: 01/17/25 0636     Blood Culture Staphylococcus, coagulase negative     Isolated from Anaerobic Bottle     Gram Stain Anaerobic Bottle Gram positive cocci in pairs and clusters    Narrative:      Probable contaminant requires clinical correlation,  susceptibility not performed unless requested by physician.      Blood Culture ID, PCR - Blood, Blood, Arterial Line [[id number]]  (Abnormal) Collected: 01/14/25 1434    Lab Status: Final result Specimen: Blood, Arterial Line Updated: 01/16/25 0657     BCID, PCR Staph spp, not aureus or lugdunensis. Identification by BCID2 PCR.     BOTTLE TYPE Anaerobic Bottle    Blood Culture - Blood, Hand, Right [[id number]]  (Normal) Collected: 01/14/25 1430    Lab Status: Final result Specimen: Blood from Hand, Right Updated: 01/19/25 1500     Blood Culture No growth at 5 days    Narrative:      Less than seven (7) mL's of blood was collected.  Insufficient quantity may yield false negative results.                Radiology:  XR Chest 1 View    Result Date: 1/19/2025  Impression: 1. Improving pulmonary vascular congestion 2. Persistent left basilar airspace disease and small left pleural effusion Electronically Signed: David Aparicio  1/19/2025 9:30 AM EST  Workstation ID: [id number]         DISCUSSION:  39 y.o. female with history of factor V Leiden, obesity, and uncontrolled type 2 diabetes admitted with hyperglycemia.   Extended intubation with 14-day hospitalization, now with fever.     PROBLEM LIST:   -- Fever, hospital onset - improving on linezolid/cefepime.  With prolonged ICU stay at 2 weeks with new fever, suspect nosocomial infection.  Consider central line associated bloodstream infection, catheter associated urinary tract infection, ventilator associated pneumonia.  UA not collected.  Blood cultures with one bottle CoNS, likely contaminant.  Prior CVL removed.  BAL culture MRSA and ESBL Klebsiella pneumoniae, c/w HAP/VAP.  -- Acute hypoxic respiratory failure, intubated since 1/1/2025.    -- Bibasilar pulmonary infiltrates in the setting of fever and extended intubation, risk for ventilator associated pneumonia.  -- Indwelling Urena catheter, risk for catheter associated UTI.  -- Right IJ central line in place from  outside ER, risk for CLABSI.  -- MRSA pneumonia, previously treated with vancomycin for 5 days, currently with linezolid.  -- Positive blood cultures.  Blood cultures x 2 from 1/1/2025 with 1 bottle positive for Gemella sanguinis and 1 bottle and the other set positive for coagulase-negative Staphylococcus.  CoNS likely contaminant.  Unclear significance for Gemella species - was treated with 10 days aminopenicillin (ampicillin followed by Unasyn).  Repeat blood cultures were no growth.  -- Obesity.  Skin appears intact at the fold without evidence for breakdown or infection.  -- Uncontrolled type 2 diabetes, contributing to propensity for infection and poor healing.  -- Factor V Leiden.  -- Listed penicillin allergy but tolerated ampicillin.    PLAN:  -- s/p 7 days linezolid for MRSA HAP  -- Continue meropenem for ESBL Klebsiella pneumoniae through 1/22/25  -- Monitoring vitals, exam, and labs - much improved  -- Monitoring for adverse reactions to antimicrobials    Discussed with patient.      Rj Boyd MD  1/20/2025  11:04 EST

## 2025-01-20 NOTE — PLAN OF CARE
Goal Outcome Evaluation:                   Anticipated Discharge Disposition (SLP): inpatient rehabilitation facility          SLP Swallowing Diagnosis: suspected pharyngeal dysphagia (01/20/25 2553)

## 2025-01-21 PROBLEM — E11.42 DIABETIC PERIPHERAL NEUROPATHY ASSOCIATED WITH TYPE 2 DIABETES MELLITUS: Status: ACTIVE | Noted: 2024-04-01

## 2025-01-21 PROBLEM — Z79.4 DIABETES MELLITUS TYPE 2, INSULIN DEPENDENT: Status: ACTIVE | Noted: 2021-12-27

## 2025-01-21 PROBLEM — E11.9 DIABETES MELLITUS TYPE 2, INSULIN DEPENDENT: Status: ACTIVE | Noted: 2021-12-27

## 2025-01-21 LAB
ANION GAP SERPL CALCULATED.3IONS-SCNC: 13 MMOL/L (ref 5–15)
BUN SERPL-MCNC: 12 MG/DL (ref 6–20)
BUN/CREAT SERPL: 20.7 (ref 7–25)
C DIFF TOX GENS STL QL NAA+PROBE: NOT DETECTED
CALCIUM SPEC-SCNC: 9.8 MG/DL (ref 8.6–10.5)
CHLORIDE SERPL-SCNC: 90 MMOL/L (ref 98–107)
CO2 SERPL-SCNC: 30 MMOL/L (ref 22–29)
CREAT SERPL-MCNC: 0.58 MG/DL (ref 0.57–1)
DEPRECATED RDW RBC AUTO: 58.8 FL (ref 37–54)
EGFRCR SERPLBLD CKD-EPI 2021: 119 ML/MIN/1.73
ERYTHROCYTE [DISTWIDTH] IN BLOOD BY AUTOMATED COUNT: 17.8 % (ref 12.3–15.4)
GLUCOSE BLDC GLUCOMTR-MCNC: 210 MG/DL (ref 70–130)
GLUCOSE BLDC GLUCOMTR-MCNC: 214 MG/DL (ref 70–130)
GLUCOSE BLDC GLUCOMTR-MCNC: 221 MG/DL (ref 70–130)
GLUCOSE BLDC GLUCOMTR-MCNC: 221 MG/DL (ref 70–130)
GLUCOSE SERPL-MCNC: 207 MG/DL (ref 65–99)
HCT VFR BLD AUTO: 30.9 % (ref 34–46.6)
HGB BLD-MCNC: 9.6 G/DL (ref 12–15.9)
MAGNESIUM SERPL-MCNC: 1.8 MG/DL (ref 1.6–2.6)
MCH RBC QN AUTO: 29.1 PG (ref 26.6–33)
MCHC RBC AUTO-ENTMCNC: 31.1 G/DL (ref 31.5–35.7)
MCV RBC AUTO: 93.6 FL (ref 79–97)
PHOSPHATE SERPL-MCNC: 4.2 MG/DL (ref 2.5–4.5)
PLATELET # BLD AUTO: 215 10*3/MM3 (ref 140–450)
PMV BLD AUTO: 8.9 FL (ref 6–12)
POTASSIUM SERPL-SCNC: 3.9 MMOL/L (ref 3.5–5.2)
RBC # BLD AUTO: 3.3 10*6/MM3 (ref 3.77–5.28)
SODIUM SERPL-SCNC: 133 MMOL/L (ref 136–145)
UFH PPP CHRO-ACNC: 0.81 IU/ML
WBC NRBC COR # BLD AUTO: 4.79 10*3/MM3 (ref 3.4–10.8)

## 2025-01-21 PROCEDURE — 80048 BASIC METABOLIC PNL TOTAL CA: CPT | Performed by: INTERNAL MEDICINE

## 2025-01-21 PROCEDURE — 25010000002 ENOXAPARIN PER 10 MG: Performed by: INTERNAL MEDICINE

## 2025-01-21 PROCEDURE — 97597 DBRDMT OPN WND 1ST 20 CM/<: CPT

## 2025-01-21 PROCEDURE — 84100 ASSAY OF PHOSPHORUS: CPT | Performed by: INTERNAL MEDICINE

## 2025-01-21 PROCEDURE — 83735 ASSAY OF MAGNESIUM: CPT | Performed by: INTERNAL MEDICINE

## 2025-01-21 PROCEDURE — 97610 LOW FREQUENCY NON-THERMAL US: CPT

## 2025-01-21 PROCEDURE — 63710000001 INSULIN GLARGINE PER 5 UNITS: Performed by: INTERNAL MEDICINE

## 2025-01-21 PROCEDURE — 25010000002 MEROPENEM PER 100 MG: Performed by: INTERNAL MEDICINE

## 2025-01-21 PROCEDURE — 93005 ELECTROCARDIOGRAM TRACING: CPT

## 2025-01-21 PROCEDURE — 85520 HEPARIN ASSAY: CPT | Performed by: INTERNAL MEDICINE

## 2025-01-21 PROCEDURE — 85027 COMPLETE CBC AUTOMATED: CPT | Performed by: INTERNAL MEDICINE

## 2025-01-21 PROCEDURE — 25010000002 BUMETANIDE PER 0.5 MG: Performed by: INTERNAL MEDICINE

## 2025-01-21 PROCEDURE — 99233 SBSQ HOSP IP/OBS HIGH 50: CPT | Performed by: PEDIATRICS

## 2025-01-21 PROCEDURE — 25010000002 METOCLOPRAMIDE PER 10 MG: Performed by: PEDIATRICS

## 2025-01-21 PROCEDURE — 82948 REAGENT STRIP/BLOOD GLUCOSE: CPT

## 2025-01-21 PROCEDURE — 63710000001 INSULIN REGULAR HUMAN PER 5 UNITS: Performed by: INTERNAL MEDICINE

## 2025-01-21 PROCEDURE — 93010 ELECTROCARDIOGRAM REPORT: CPT | Performed by: INTERNAL MEDICINE

## 2025-01-21 PROCEDURE — 87493 C DIFF AMPLIFIED PROBE: CPT | Performed by: PEDIATRICS

## 2025-01-21 PROCEDURE — 25010000002 PROCHLORPERAZINE 10 MG/2ML SOLUTION: Performed by: INTERNAL MEDICINE

## 2025-01-21 RX ORDER — METOCLOPRAMIDE HYDROCHLORIDE 5 MG/ML
10 INJECTION INTRAMUSCULAR; INTRAVENOUS EVERY 6 HOURS
Status: DISCONTINUED | OUTPATIENT
Start: 2025-01-21 | End: 2025-01-30

## 2025-01-21 RX ORDER — HYDROCODONE BITARTRATE AND ACETAMINOPHEN 5; 325 MG/1; MG/1
1 TABLET ORAL EVERY 4 HOURS PRN
Status: DISCONTINUED | OUTPATIENT
Start: 2025-01-21 | End: 2025-01-27

## 2025-01-21 RX ORDER — OXYCODONE HCL 5 MG/5 ML
5 SOLUTION, ORAL ORAL EVERY 4 HOURS PRN
Status: DISPENSED | OUTPATIENT
Start: 2025-01-21 | End: 2025-01-30

## 2025-01-21 RX ORDER — SACCHAROMYCES BOULARDII 250 MG
250 CAPSULE ORAL 2 TIMES DAILY
Status: DISCONTINUED | OUTPATIENT
Start: 2025-01-21 | End: 2025-01-22

## 2025-01-21 RX ORDER — DULOXETIN HYDROCHLORIDE 60 MG/1
60 CAPSULE, DELAYED RELEASE ORAL DAILY
Status: DISCONTINUED | OUTPATIENT
Start: 2025-01-21 | End: 2025-02-03 | Stop reason: HOSPADM

## 2025-01-21 RX ORDER — CASTOR OIL AND BALSAM, PERU 788; 87 MG/G; MG/G
1 OINTMENT TOPICAL EVERY 12 HOURS SCHEDULED
Status: DISCONTINUED | OUTPATIENT
Start: 2025-01-21 | End: 2025-01-21

## 2025-01-21 RX ORDER — METOPROLOL SUCCINATE 50 MG/1
50 TABLET, EXTENDED RELEASE ORAL 2 TIMES DAILY
Status: DISCONTINUED | OUTPATIENT
Start: 2025-01-21 | End: 2025-02-03 | Stop reason: HOSPADM

## 2025-01-21 RX ORDER — AMLODIPINE BESYLATE 10 MG/1
10 TABLET ORAL DAILY
Status: DISCONTINUED | OUTPATIENT
Start: 2025-01-21 | End: 2025-01-23

## 2025-01-21 RX ORDER — DULOXETIN HYDROCHLORIDE 30 MG/1
30 CAPSULE, DELAYED RELEASE ORAL NIGHTLY
Status: DISCONTINUED | OUTPATIENT
Start: 2025-01-21 | End: 2025-02-03 | Stop reason: HOSPADM

## 2025-01-21 RX ADMIN — INSULIN HUMAN 8 UNITS: 100 INJECTION, SOLUTION PARENTERAL at 00:59

## 2025-01-21 RX ADMIN — ENOXAPARIN SODIUM 120 MG: 120 INJECTION SUBCUTANEOUS at 00:58

## 2025-01-21 RX ADMIN — INSULIN GLARGINE 30 UNITS: 100 INJECTION, SOLUTION SUBCUTANEOUS at 12:08

## 2025-01-21 RX ADMIN — METOCLOPRAMIDE HYDROCHLORIDE 10 MG: 10 INJECTION, SOLUTION INTRAMUSCULAR; INTRAVENOUS at 16:13

## 2025-01-21 RX ADMIN — INSULIN HUMAN 10 UNITS: 100 INJECTION, SOLUTION PARENTERAL at 18:06

## 2025-01-21 RX ADMIN — HYDROCODONE BITARTRATE AND ACETAMINOPHEN 1 TABLET: 5; 325 TABLET ORAL at 21:36

## 2025-01-21 RX ADMIN — DULOXETINE HYDROCHLORIDE 30 MG: 30 CAPSULE, DELAYED RELEASE ORAL at 21:36

## 2025-01-21 RX ADMIN — PROCHLORPERAZINE EDISYLATE 5 MG: 5 INJECTION INTRAMUSCULAR; INTRAVENOUS at 08:00

## 2025-01-21 RX ADMIN — INSULIN HUMAN 10 UNITS: 100 INJECTION, SOLUTION PARENTERAL at 06:18

## 2025-01-21 RX ADMIN — DULOXETINE HYDROCHLORIDE 60 MG: 60 CAPSULE, DELAYED RELEASE ORAL at 12:11

## 2025-01-21 RX ADMIN — MEROPENEM 1000 MG: 1 INJECTION, POWDER, FOR SOLUTION INTRAVENOUS at 05:24

## 2025-01-21 RX ADMIN — METOPROLOL SUCCINATE 50 MG: 50 TABLET, EXTENDED RELEASE ORAL at 16:15

## 2025-01-21 RX ADMIN — HYDROCODONE BITARTRATE AND ACETAMINOPHEN 1 TABLET: 5; 325 TABLET ORAL at 17:07

## 2025-01-21 RX ADMIN — INSULIN GLARGINE 30 UNITS: 100 INJECTION, SOLUTION SUBCUTANEOUS at 21:40

## 2025-01-21 RX ADMIN — AMITRIPTYLINE HYDROCHLORIDE 25 MG: 25 TABLET, FILM COATED ORAL at 21:36

## 2025-01-21 RX ADMIN — INSULIN HUMAN 8 UNITS: 100 INJECTION, SOLUTION PARENTERAL at 06:17

## 2025-01-21 RX ADMIN — METOCLOPRAMIDE HYDROCHLORIDE 10 MG: 10 INJECTION, SOLUTION INTRAMUSCULAR; INTRAVENOUS at 22:38

## 2025-01-21 RX ADMIN — GABAPENTIN 300 MG: 300 CAPSULE ORAL at 06:17

## 2025-01-21 RX ADMIN — ENOXAPARIN SODIUM 120 MG: 120 INJECTION SUBCUTANEOUS at 12:13

## 2025-01-21 RX ADMIN — METOPROLOL SUCCINATE 50 MG: 50 TABLET, EXTENDED RELEASE ORAL at 21:43

## 2025-01-21 RX ADMIN — HYDROCODONE BITARTRATE AND ACETAMINOPHEN 1 TABLET: 5; 325 TABLET ORAL at 06:17

## 2025-01-21 RX ADMIN — BUMETANIDE 1 MG: 0.25 INJECTION INTRAMUSCULAR; INTRAVENOUS at 08:00

## 2025-01-21 RX ADMIN — INSULIN HUMAN 10 UNITS: 100 INJECTION, SOLUTION PARENTERAL at 00:58

## 2025-01-21 RX ADMIN — GABAPENTIN 300 MG: 300 CAPSULE ORAL at 21:36

## 2025-01-21 RX ADMIN — METOCLOPRAMIDE HYDROCHLORIDE 10 MG: 10 INJECTION, SOLUTION INTRAMUSCULAR; INTRAVENOUS at 12:12

## 2025-01-21 RX ADMIN — Medication 250 MG: at 21:36

## 2025-01-21 RX ADMIN — INSULIN HUMAN 8 UNITS: 100 INJECTION, SOLUTION PARENTERAL at 12:45

## 2025-01-21 RX ADMIN — ACETAMINOPHEN 650 MG: 650 SOLUTION ORAL at 12:12

## 2025-01-21 RX ADMIN — INSULIN HUMAN 10 UNITS: 100 INJECTION, SOLUTION PARENTERAL at 12:09

## 2025-01-21 RX ADMIN — NYSTATIN: 100000 POWDER TOPICAL at 21:37

## 2025-01-21 RX ADMIN — Medication 10 ML: at 08:00

## 2025-01-21 RX ADMIN — GABAPENTIN 300 MG: 300 CAPSULE ORAL at 16:13

## 2025-01-21 RX ADMIN — HYDROCODONE BITARTRATE AND ACETAMINOPHEN 1 TABLET: 5; 325 TABLET ORAL at 12:09

## 2025-01-21 RX ADMIN — NYSTATIN: 100000 POWDER TOPICAL at 07:59

## 2025-01-21 RX ADMIN — MEROPENEM 1000 MG: 1 INJECTION, POWDER, FOR SOLUTION INTRAVENOUS at 12:11

## 2025-01-21 RX ADMIN — AMLODIPINE BESYLATE 10 MG: 10 TABLET ORAL at 12:10

## 2025-01-21 RX ADMIN — INSULIN HUMAN 8 UNITS: 100 INJECTION, SOLUTION PARENTERAL at 18:06

## 2025-01-21 RX ADMIN — MEROPENEM 1000 MG: 1 INJECTION, POWDER, FOR SOLUTION INTRAVENOUS at 21:37

## 2025-01-21 NOTE — CASE MANAGEMENT/SOCIAL WORK
Continued Stay Note  CRISTINA Hillman     Patient Name: Natalia Arzate  MRN: [id number]  Today's Date: 1/21/2025    Admit Date: 1/2/2025    Plan: Rehab   Discharge Plan       Row Name 01/21/25 1234       Plan    Plan Rehab    Plan Comments Per discussion in MDR, Pt is c/o nausea. Pain and BP meds were added by MD. Pt is receiving IV Abx and is on 1L NC. She failed FEES yesterday and is receiving tube feed via NG tube. [facility] is following. CM will continue to follow for medical readiness.    Final Discharge Disposition Code 62 - inpatient rehab facility                   Discharge Codes    No documentation.                 Expected Discharge Date and Time       Expected Discharge Date Expected Discharge Time    Jan 23, 2025               Pallavi Donohue RN

## 2025-01-21 NOTE — PROGRESS NOTES
[facility] Medicine Services  PROGRESS NOTE    Patient Name: Natalia Arzate  : 1985  MRN: [id number]    Date of Admission: 2025  Primary Care Physician: Bladimir Calle PA-C    Subjective   Subjective     CC:  Resp failure    HPI:  Patient was transferred out of the ICU overnight.  This morning she is complaining mostly of pain in her back as well as pretty significant nausea.  Patient is tolerating her tube feeds but is having frequent watery stools.  Family is concerned and would like to try to get her up out of bed.      Objective   Objective     Vital Signs:   Temp:  [97.8 °F (36.6 °C)-99.4 °F (37.4 °C)] 99.4 °F (37.4 °C)  Heart Rate:  [] 106  Resp:  [20-24] 20  BP: (136-161)/() 161/93  Flow (L/min) (Oxygen Therapy):  [1] 1     Physical Exam:  Constitutional: No acute distress, awake, alert, ill appearing and uncomfortable  HENT: NCAT, mucous membranes moist, keofeed in place  Respiratory: Coarse upper airway noise bilaterally, respiratory effort slightly tachypneic  Cardiovascular: Tachycardic, no murmurs, rubs, or gallops  Gastrointestinal: Positive bowel sounds, soft, generally tender but worse in her mid abd, nondistended  Musculoskeletal: 2+ bilateral ankle edema  Psychiatric: Appropriate affect, cooperative  Neurologic: Oriented x 3, strength symmetric in all extremities, Cranial Nerves grossly intact to confrontation, speech clear  Skin: No rashes      Results Reviewed:  LAB RESULTS:      Lab 25  0658 25  2045 25  1810 25  0431 25  0431 25  1325 25  0526 25  1238 25  0558 01/15/25  0430   WBC 4.82  --   --  5.15 4.84  --  5.41  --  5.17 8.30   HEMOGLOBIN 8.9*  --   --  8.1* 8.2*  --  7.8*  --  7.9* 8.1*   HEMATOCRIT 28.9*  --   --  26.7* 26.5*  --  25.2*  --  25.9* 26.1*   PLATELETS 210  --   --  184 144  --  138*  --  141 154   NEUTROS ABS  --   --   --  3.34 3.36  --  3.99  --  4.04 6.64    IMMATURE GRANS (ABS)  --   --   --  0.07* 0.04  --  0.04  --  0.04 0.07*   LYMPHS ABS  --   --   --  1.03 0.82  --  0.75  --  0.57* 0.87   MONOS ABS  --   --   --  0.54 0.42  --  0.43  --  0.36 0.49   EOS ABS  --   --   --  0.14 0.17  --  0.17  --  0.14 0.20   MCV 93.5  --   --  96.4 94.6  --  94.4  --  95.2 93.5   CRP  --   --   --   --   --  4.07*  --   --   --   --    HEPARIN ANTI-XA 0.52 0.65 0.10* 0.61 0.46  --  0.41   < > 0.10* 0.54    < > = values in this interval not displayed.         Lab 01/20/25  0658 01/19/25  0431 01/18/25  0431 01/17/25  1325 01/17/25  0526 01/16/25  1821 01/16/25  0558 01/15/25  1535 01/15/25  0430   SODIUM 131* 134* 138 135* 137  --  136  --  140   POTASSIUM 3.7 3.9 3.8  3.8 3.5 3.7   < > 3.4*   < > 3.0*  3.0*   CHLORIDE 89* 93* 97* 93* 98  --  95*  --  97*   CO2 30.0* 30.0* 30.0* 31.0* 29.0  --  24.0  --  28.0   ANION GAP 12.0 11.0 11.0 11.0 10.0  --  17.0*  --  15.0   BUN 14 21* 25* 27* 29*  --  31*  --  30*   CREATININE 0.61 0.66 0.66 0.71 0.71  --  0.82  --  0.87   EGFR 117.5 115.3 115.3 111.8 111.8  --  94.0  --  87.6   GLUCOSE 194* 164* 150* 138* 177*  --  241*  --  173*   CALCIUM 9.5 9.6 9.7 9.9 9.7  --  9.5  --  9.7   MAGNESIUM 1.3* 1.5* 1.7 2.0 1.4*  --  1.3*  --  2.2   PHOSPHORUS  --   --  4.3 3.8 3.6  --  3.9  --  3.3    < > = values in this interval not displayed.         Lab 01/18/25  0431 01/17/25  1325 01/17/25  0526   TOTAL PROTEIN 7.3 7.8 7.2   ALBUMIN 3.4* 3.6 3.3*   GLOBULIN 3.9 4.2 3.9   ALT (SGPT) 19 16 16   AST (SGOT) 53* 44* 44*   BILIRUBIN 0.3 0.3 0.3   ALK PHOS 124* 136* 129*             Lab 01/17/25  1325   CHOLESTEROL 147   TRIGLYCERIDES 345*             Lab 01/17/25  1016 01/16/25  0543   PH, ARTERIAL 7.423 7.336*   PCO2, ARTERIAL 41.7 45.9*   PO2 .0* 92.9   FIO2 45 65   HCO3 ART 27.2* 24.5   BASE EXCESS ART 2.5* -1.4*   CARBOXYHEMOGLOBIN 1.4 1.5     Brief Urine Lab Results  (Last result in the past 365 days)        Color   Clarity   Blood    Leuk Est   Nitrite   Protein   CREAT   Urine HCG        01/02/25 1213             52.3         01/02/25 1213 Yellow   Cloudy   Moderate (2+)   Negative   Negative   100 mg/dL (2+)                   Microbiology Results Abnormal       Procedure Component Value - Date/Time    Respiratory Culture - Wash, Lung, Left Lower Lobe [[id number]]  (Abnormal)  (Susceptibility) Collected: 01/14/25 1608    Lab Status: Final result Specimen: Wash from Lung, Left Lower Lobe Updated: 01/17/25 1221     Respiratory Culture Light growth (2+) Klebsiella pneumoniae ESBL     Comment:   Consider infectious disease consult.  Susceptibility results may not correlate to clinical outcomes.         Light growth (2+) Staphylococcus aureus, MRSA     Comment:   Considering site of infection and appropriate dosing, oxacillin (or cefoxitin) results can be applied to cefazolin, nafcillin, ampicillin/sulbactam, dicloxacillin, amoxicillin/clavulanate, cephalexin, and cefpodoxime.  Methicillin resistant Staphylococcus aureus, Patient may be an isolation risk.         Rare growth Normal Respiratory Margo     Gram Stain Many (4+) WBCs per low power field      Rare (1+) Epithelial cells per low power field      Rare (1+) Gram positive cocci in pairs and clusters    Susceptibility        Klebsiella pneumoniae ESBL      KELSIE      Ciprofloxacin Resistant      Ertapenem Susceptible      Levofloxacin Resistant      Meropenem Susceptible      Tetracycline Resistant      Trimethoprim + Sulfamethoxazole Resistant                       Susceptibility        Staphylococcus aureus, MRSA      KELSIE      Clindamycin Resistant      Linezolid Susceptible      Oxacillin Resistant      Tetracycline Susceptible      Trimethoprim + Sulfamethoxazole Susceptible      Vancomycin Susceptible                       Susceptibility Comments       Klebsiella pneumoniae ESBL    With the exception of urinary-sourced infections, aminoglycosides should not be used as monotherapy.       Staphylococcus aureus, MRSA    This isolate is presumed to be clindamycin resistant based on detection of inducible clindamycin resistance.  Clindamycin may still be effective in some patients.  This isolate is presumed to be clindamycin resistant based on detection of inducible clindamycin resistance.  Clindamycin may still be effective in some patients.               Respiratory Culture - Sputum, ET Suction [[id number]]  (Abnormal) Collected: 01/14/25 1805    Lab Status: Final result Specimen: Sputum from ET Suction Updated: 01/17/25 1221     Respiratory Culture Light growth (2+) Klebsiella pneumoniae ESBL     Comment:   Consider infectious disease consult.  Susceptibility results may not correlate to clinical outcomes.         Light growth (2+) Staphylococcus aureus, MRSA     Comment:   Considering site of infection and appropriate dosing, oxacillin (or cefoxitin) results can be applied to cefazolin, nafcillin, ampicillin/sulbactam, dicloxacillin, amoxicillin/clavulanate, cephalexin, and cefpodoxime.  Methicillin resistant Staphylococcus aureus, Patient may be an isolation risk.        Gram Stain Many (4+) WBCs per low power field      Rare (1+) Epithelial cells per low power field      Few (2+) Gram positive cocci in pairs, chains and clusters    Narrative:      Refer to LLL Wash culture for MICS.     Blood Culture - Blood, Blood, Arterial Line [[id number]]  (Abnormal) Collected: 01/14/25 1434    Lab Status: Final result Specimen: Blood, Arterial Line Updated: 01/17/25 0636     Blood Culture Staphylococcus, coagulase negative     Isolated from Anaerobic Bottle     Gram Stain Anaerobic Bottle Gram positive cocci in pairs and clusters    Narrative:      Probable contaminant requires clinical correlation, susceptibility not performed unless requested by physician.      Blood Culture ID, PCR - Blood, Blood, Arterial Line [[id number]]  (Abnormal) Collected: 01/14/25 1434    Lab Status: Final result Specimen: Blood,  Arterial Line Updated: 01/16/25 0657     BCID, PCR Staph spp, not aureus or lugdunensis. Identification by BCID2 PCR.     BOTTLE TYPE Anaerobic Bottle    Blood Culture - Blood, Arm, Left [[id number]]  (Abnormal) Collected: 01/05/25 1305    Lab Status: Final result Specimen: Blood from Arm, Left Updated: 01/07/25 1100     Blood Culture Staphylococcus, coagulase negative     Isolated from Anaerobic Bottle     Gram Stain Anaerobic Bottle Gram positive cocci in clusters    Narrative:      Less than seven (7) mL's of blood was collected.  Insufficient quantity may yield false negative results.  Probable contaminant requires clinical correlation, susceptibility not performed unless requested by physician.    Blood Culture ID, PCR - Blood, Arm, Left [[id number]]  (Abnormal) Collected: 01/05/25 1305    Lab Status: Final result Specimen: Blood from Arm, Left Updated: 01/06/25 1435     BCID, PCR Staph spp, not aureus or lugdunensis. Identification by BCID2 PCR.     BOTTLE TYPE Anaerobic Bottle    Respiratory Culture - Bronchial Wash, Lung, Left Lower Lobe [[id number]]  (Abnormal)  (Susceptibility) Collected: 01/04/25 0755    Lab Status: Final result Specimen: Bronchial Wash from Lung, Left Lower Lobe Updated: 01/06/25 0908     Respiratory Culture Scant growth (1+) Staphylococcus aureus, MRSA     Comment:   Methicillin resistant Staphylococcus aureus, Patient may be an isolation risk.         No Normal Respiratory Margo     Gram Stain Moderate (3+) WBCs seen      Rare (1+) Gram positive cocci in pairs and clusters    Susceptibility        Staphylococcus aureus, MRSA      KELSIE      Clindamycin Resistant      Linezolid Susceptible      Oxacillin Resistant      Tetracycline Susceptible      Trimethoprim + Sulfamethoxazole Susceptible      Vancomycin Susceptible                       Susceptibility Comments       Staphylococcus aureus, MRSA    This isolate is presumed to be clindamycin resistant based on detection of inducible  clindamycin resistance.  Clindamycin may still be effective in some patients.  This isolate is presumed to be clindamycin resistant based on detection of inducible clindamycin resistance.  Clindamycin may still be effective in some patients.               Respiratory Culture - Sputum, ET Suction [[id number]]  (Abnormal)  (Susceptibility) Collected: 01/02/25 1212    Lab Status: Final result Specimen: Sputum from ET Suction Updated: 01/04/25 0939     Respiratory Culture Moderate growth (3+) Staphylococcus aureus, MRSA      No Normal Respiratory Margo     Gram Stain Many (4+) WBCs per low power field      Rare (1+) Epithelial cells per low power field      Many (4+) Gram positive cocci in pairs and clusters    Susceptibility        Staphylococcus aureus, MRSA      KELSIE      Clindamycin Resistant      Linezolid Susceptible      Oxacillin Resistant      Tetracycline Susceptible      Trimethoprim + Sulfamethoxazole Susceptible      Vancomycin Susceptible                       Susceptibility Comments       Staphylococcus aureus, MRSA    This isolate is presumed to be clindamycin resistant based on detection of inducible clindamycin resistance.  Clindamycin may still be effective in some patients.               MRSA Screen, PCR (Inpatient) - Swab, Nares [[id number]]  (Abnormal) Collected: 01/02/25 1246    Lab Status: Final result Specimen: Swab from Nares Updated: 01/02/25 1504     MRSA PCR Positive    Narrative:      The negative predictive value of this diagnostic test is high and should only be used to consider de-escalating anti-MRSA therapy. A positive result may indicate colonization with MRSA and must be correlated clinically.            SLP FEES - Fiberoptic Endo Eval Swallow    Result Date: 1/20/2025  This procedure was auto-finalized with no dictation required.     Results for orders placed during the hospital encounter of 01/02/25    Adult Transthoracic Echo Complete W/ Cont if Necessary Per  Protocol    Interpretation Summary    Left ventricular systolic function is normal. Calculated left ventricular EF = 59.3% Left ventricular ejection fraction appears to be 61 - 65%.    Left ventricular wall thickness is consistent with borderline concentric hypertrophy.    Left ventricular diastolic function was normal.    Mild aortic valve stenosis is present.    Peak velocity of the flow distal to the aortic valve is 292 cm/s. Aortic valve mean pressure gradient is 20 mmHg.    Estimated right ventricular systolic pressure from tricuspid regurgitation is normal (<35 mmHg).      Current medications:  Scheduled Meds:amitriptyline, 25 mg, Oral, Nightly  amLODIPine, 10 mg, Oral, Daily  bumetanide, 1 mg, Intravenous, Daily  castor oil-balsam peru, 1 Application, Topical, Q12H  DULoxetine, 30 mg, Oral, Nightly  DULoxetine, 60 mg, Oral, Daily  enoxaparin, 120 mg, Subcutaneous, Q12H  gabapentin, 300 mg, Nasogastric, Q8H  insulin glargine, 30 Units, Subcutaneous, Q12H  insulin regular, 10 Units, Subcutaneous, Q6H  insulin regular, 4-24 Units, Subcutaneous, Q6H  meropenem, 1,000 mg, Intravenous, Q8H  metoclopramide, 10 mg, Intravenous, Q6H  metoprolol succinate XL, 50 mg, Oral, BID  nystatin, , Topical, Q12H      Continuous Infusions:   PRN Meds:.  acetaminophen    dextrose    glucagon (human recombinant)    HYDROcodone-acetaminophen    ipratropium    ipratropium-albuterol    Magnesium Standard Dose Replacement - Follow Nurse / BPA Driven Protocol    midazolam    ondansetron    oxyCODONE    Polyvinyl Alcohol-Povidone PF    Potassium Replacement - Follow Nurse / BPA Driven Protocol    sodium chloride    sodium chloride    Assessment & Plan   Assessment & Plan     Active Hospital Problems    Diagnosis  POA    **Acute respiratory failure with hypercapnia [J96.02]  Yes    Sepsis [A41.9]  Yes    Type 2 diabetes mellitus with hyperglycemia [E11.65]  Yes    Hypothyroid [E03.9]  Yes    Factor 5 Leiden mutation, heterozygous [D68.51]   Yes    MARIAA (acute kidney injury) [N17.9]  Yes    Primary hypertension [I10]  Yes    PTSD (post-traumatic stress disorder) [F43.10]  Yes    History of DVT in adulthood [Z86.718]  Not Applicable      Resolved Hospital Problems   No resolved problems to display.        Brief Hospital Course to date:  Natalia Arzate is a 39 y.o. female with a history of HLD, Hypothyroidism, Afib, PE/DVT, Factor V Leiden mutation, and stroke who presented to [facility] [place] with altered mental status. Labs there were significant for renal failure, hyperglycemia, and hypercapnic respiratory failure. Dx with  pneumonia and resp failure.  She was intubated and required the initiation of vasopressors. She was transferred to [facility] on 1/2/25 for ongoing care.    Acute hypoxic resp failure-improving  Severe sepsis with end organ failure- renal failure and resp failure  MRSA PNA--s/p vanc/linezolid  S/p intubation, extubated on 1/17.  ESBL Klebsiella PNA  --Pt underwent bronch on 1/14 with significant mucus plugging, post CXR with improvement in L lung aeration.  --Currently on NC--cont pulmonary toilet, wean O2 as tolerated.  --Cont merrem through 1/22/25    Bacteremia- 1 bottle Gemella sanguinis--unclear significance  --s/p 10 days amp/unasyn.    MARIAA due to severe sepsis  S/P L nephrectomy in 2022  --Pt required CRRT ~ 1/8/25-1/11/25   --Renal fx improving, no need for further HD    Severe dysphagia  --cont ST.  --Currently with keofeed getting tube feeds  --Continue Zofran but change the Compazine to Reglan to aid with nausea and vomiting.    factor V Leiden mutation  History of PE/DVT  History of stroke  --cont home lovenox    DM  -- Continue Lantus and regular insulin while on tube feeds.    Hypertension  History A-fib  --Restart home metoprolol, norvasc.  On lovenox    Diarrhea  --check c diff    Hx PTSD  --restart home cymbalta and elavil    Expected Discharge Location and Transportation: pending  Expected Discharge   Expected Discharge Date:  1/27/2025; Expected Discharge Time:      VTE Prophylaxis:  Pharmacologic & mechanical VTE prophylaxis orders are present.         AM-PAC 6 Clicks Score (PT): 8 (01/20/25 2159)    CODE STATUS:   Code Status and Medical Interventions: CPR (Attempt to Resuscitate); Full Support   Ordered at: 01/02/25 1952     Code Status (Patient has no pulse and is not breathing):    CPR (Attempt to Resuscitate)     Medical Interventions (Patient has pulse or is breathing):    Full Support       Diana Ghotra MD  01/21/25    Total time spent: Time Spent: Time Spent: 60 minutes  Time spent includes time reviewing chart, face-to-face time, counseling patient/family/caregiver, ordering medications/tests/procedures, communicating with other health care professionals, documenting clinical information in the electronic health record, and coordination of care.

## 2025-01-21 NOTE — THERAPY WOUND CARE TREATMENT
Acute Care - Wound/Debridement Treatment Note  [facility]     Patient Name: Natalia Arzate  : 1985  MRN: [id number]  Today's Date: 2025                          Admit Date: 2025    Visit Dx:    ICD-10-CM ICD-9-CM   1. Respiratory acidosis with metabolic acidosis  E87.4 276.3   2. Acute respiratory failure with hypoxia  J96.01 518.81   3. MARIAA (acute kidney injury)  N17.9 584.9   4. Pharyngeal dysphagia  R13.13 787.23       Patient Active Problem List   Diagnosis    Nephrolithiasis    Ovarian teratoma, right    Other chronic pain    Pelvic pain    History of DVT in adulthood    History of pulmonary embolism    Ureteral calculus    Ischemic cerebrovascular accident (CVA)    Primary hypertension    Left lumbar radiculopathy    PTSD (post-traumatic stress disorder)    MARIAA (acute kidney injury)    Anemia    Dyslipidemia    Hypothyroid    Other secondary gout, multiple sites    Factor 5 Leiden mutation, heterozygous    Vitamin D deficiency    Vitamin B 12 deficiency    Type 2 diabetes mellitus with hyperglycemia    Hoarseness, persistent    Ureterolithiasis    Acute cystitis without hematuria    Hepatic steatosis    Right flank pain, chronic    Detrusor instability of bladder    Frequency of micturition    Acute respiratory failure with hypercapnia    Hyperkalemia    Sepsis    Respiratory acidosis with metabolic acidosis    Acute respiratory failure    Diabetes mellitus type 2, insulin dependent    Diabetic peripheral neuropathy associated with type 2 diabetes mellitus        Past Medical History:   Diagnosis Date    A-fib     Abnormal ECG     Anemia     Anxiety     Asthma     Cancer     Ovarian    Depression     Diabetes mellitus     DVT (deep venous thrombosis)     Factor 5 Leiden mutation, heterozygous     Fibroid     GERD (gastroesophageal reflux disease)     Gout     H/O abdominal abscess     History of sepsis     History of transfusion     Hyperlipidemia     Hypothyroid     Kidney stone      Migraines     Neuropathy     Ovarian cancer 2021    Ovarian cyst     PE (pulmonary embolism)     Polycystic ovary syndrome     Preeclampsia     Rh incompatibility     Stroke     TIA (transient ischemic attack)     Urinary tract infection     Varicella         Past Surgical History:   Procedure Laterality Date    ABDOMINAL SURGERY      CARDIAC CATHETERIZATION       SECTION      CHOLECYSTECTOMY      COLONOSCOPY      ENDOSCOPY      EXTRACORPOREAL SHOCK WAVE LITHOTRIPSY (ESWL) Left 10/22/2021    Procedure: EXTRACORPOREAL SHOCKWAVE LITHOTRIPSY;  Surgeon: Milan Motley MD;  Location: [facility] OR;  Service: Urology;  Laterality: Left;    HYSTERECTOMY  2017    ovarian ca    INSERT CENTRAL LINE AT BEDSIDE  2025    LITHOTRIPSY      NEPHRECTOMY Left 10/2022    RIGHT OOPHORECTOMY      URETEROSCOPY LASER LITHOTRIPSY WITH STENT INSERTION Left 10/01/2021    Procedure: URETEROSCOPY WITH STENT PLACEMENT;  Surgeon: Milan Motley MD;  Location: [facility] OR;  Service: Urology;  Laterality: Left;    URETEROSCOPY LASER LITHOTRIPSY WITH STENT INSERTION Left 2022    Procedure: URETEROSCOPY STENT REMOVAL;  Surgeon: Milan Motley MD;  Location: [facility] OR;  Service: Urology;  Laterality: Left;    URETEROSCOPY LASER LITHOTRIPSY WITH STENT INSERTION Left 2022    Procedure: CYSTOSCOPY RETROGRADE LEFT WITH STENT PLACEMENT;  Surgeon: Milan Motley MD;  Location: [facility] OR;  Service: Urology;  Laterality: Left;           Wound 25 0400 Left lateral flank (Active)   Dressing Appearance dry;intact 25   Closure Adhesive bandage 25   Base slough;red;pink;yellow 25 1015   Drainage Amount none 25   Care, Wound irrigated with;sterile normal saline;ultrasound therapy, non contact low frequency 25 1015   Dressing Care dressing changed;dressing applied 25   Periwound Care absorptive dressing applied 25       Wound 01/15/25  0314 Left gluteal blisters (Active)   Dressing Appearance dry;intact 01/20/25 2159   Closure Adhesive bandage 01/20/25 2159   Base pink 01/21/25 1015   Care, Wound irrigated with;sterile normal saline;negative pressure wound therapy 01/21/25 1015   Dressing Care dressing changed 01/20/25 2159   Periwound Care absorptive dressing applied 01/20/25 2159       Wound 01/17/25 2133 Right lateral throat (Active)   Closure Open to air 01/20/25 2159   Dressing Care gauze, antimicrobial;transparent film 01/20/25 2159         WOUND DEBRIDEMENT  Total area of Debridement: 10cm2  Debridement Site 1  Location- Site 1: L lateral back  Selective Debridement- Site 1: Wound Surface <20cmsq  Instruments- Site 1: tweezers  Excised Tissue Description- Site 1: minimum, slough, other (comment) (nonviable tissue)               PT Assessment (Last 12 Hours)       PT Evaluation and Treatment       Row Name 01/21/25 1015          Physical Therapy Time and Intention    Subjective Information no complaints  -KW     Document Type wound care;therapy note (daily note)  -KW     Mode of Treatment physical therapy  -KW       Row Name 01/21/25 1015          General Information    Patient Profile Reviewed yes  -KW       Row Name 01/21/25 1015          Wound 01/05/25 0400 Left lateral flank    Wound - Properties Group Placement Date: 01/05/25  -BC Placement Time: 0400  -BC Side: Left  -BC Orientation: lateral  -BC Location: flank  -BC    Base slough;red;pink;yellow  -KW     Care, Wound irrigated with;sterile normal saline;ultrasound therapy, non contact low frequency  xeroform  -KW     Retired Wound - Properties Group Placement Date: 01/05/25  -BC Placement Time: 0400  -BC Side: Left  -BC Orientation: lateral  -BC Location: flank  -BC    Retired Wound - Properties Group Placement Date: 01/05/25  -BC Placement Time: 0400  -BC Side: Left  -BC Orientation: lateral  -BC Location: flank  -BC    Retired Wound - Properties Group Date first assessed: 01/05/25   -BC Time first assessed: 0400 -BC Side: Left  -BC Location: flank  -BC      Row Name 01/21/25 1015          Wound 01/15/25 0314 Left gluteal blisters    Wound - Properties Group Placement Date: 01/15/25  -SE Placement Time: 0314  -SE Side: Left  -SE Location: gluteal  -SE Primary Wound Type: Blisters  -SE Type: blisters  -SE Present on Original Admission: N  -SE    Base pink  -KW     Care, Wound irrigated with;sterile normal saline;negative pressure wound therapy  -KW     Retired Wound - Properties Group Placement Date: 01/15/25  -SE Placement Time: 0314  -SE Present on Original Admission: N  -SE Side: Left  -SE Location: gluteal  -SE Primary Wound Type: Blisters  -SE Type: blisters  -SE    Retired Wound - Properties Group Placement Date: 01/15/25  -SE Placement Time: 0314  -SE Present on Original Admission: N  -SE Side: Left  -SE Location: gluteal  -SE Primary Wound Type: Blisters  -SE Type: blisters  -SE    Retired Wound - Properties Group Date first assessed: 01/15/25  -SE Time first assessed: 0314  -SE Present on Original Admission: N  -SE Side: Left  -SE Location: gluteal  -SE Primary Wound Type: Blisters  -SE Type: blisters  -SE      Row Name             Wound 01/17/25 2133 Right lateral throat    Wound - Properties Group Placement Date: 01/17/25  - Placement Time: 2133 -MH Side: Right  -MH Orientation: lateral  -MH Location: throat  -MH Primary Wound Type: Puncture  -MH    Retired Wound - Properties Group Placement Date: 01/17/25  - Placement Time: 2133 -MH Side: Right  -MH Orientation: lateral  -MH Location: throat  -MH Primary Wound Type: Puncture  -MH    Retired Wound - Properties Group Placement Date: 01/17/25  - Placement Time: 2133 -MH Side: Right  -MH Orientation: lateral  -MH Location: throat  -MH Primary Wound Type: Puncture  -MH    Retired Wound - Properties Group Date first assessed: 01/17/25  - Time first assessed: 2133 -MH Side: Right  -MH Location: throat  -MH Primary Wound Type:  Puncture  -MH      Row Name             Wound 01/21/25 0746 lumbar spine    Wound - Properties Group Placement Date: 01/21/25  - Placement Time: 0746 - Location: lumbar spine  -AH Primary Wound Type: MASD  -AH, large white wound, appearing to be worse than MASD  Present on Original Admission: N  - Additional Comments: nurse put zeroform and mepliex  -AH    Retired Wound - Properties Group Placement Date: 01/21/25  - Placement Time: 0746  - Present on Original Admission: N  - Location: lumbar spine  -AH Primary Wound Type: MASD  -AH, large white wound, appearing to be worse than MASD  Additional Comments: nurse put zeroform and mepliex  -AH    Retired Wound - Properties Group Placement Date: 01/21/25  - Placement Time: 0746  - Present on Original Admission: N  - Location: lumbar spine  -AH Primary Wound Type: MASD  -AH, large white wound, appearing to be worse than MASD  Additional Comments: nurse put zeroform and mepliex  -AH    Retired Wound - Properties Group Date first assessed: 01/21/25  - Time first assessed: 0746  - Present on Original Admission: N  - Location: lumbar spine  -AH Primary Wound Type: MASD  -AH, large white wound, appearing to be worse than MASD  Additional Comments: nurse put zeroform and mepliex  -AH      Row Name 01/21/25 1015          Plan of Care Review    Plan of Care Reviewed With patient  -KW     Progress improving  -KW     Outcome Evaluation Patient with wound to L lateral trunk and sacrum. Wound bed with loose nonviable tissue and thickened slough on back and loose tissue on trunk. . PT able to debride loose non-viable tissue/ slough. PT applied mist therapy treatment to improve blood flow, decreased inflammation and promote granulation. Patient would continue to benefit from mist therapy to improve wound healing  -KW       Row Name 01/21/25 1015          Positioning and Restraints    Pre-Treatment Position in bed  -KW     Post Treatment Position bed  -KW     In  Bed supine;call light within reach;encouraged to call for assist  -KW               User Key  (r) = Recorded By, (t) = Taken By, (c) = Cosigned By      Initials Name Provider Type     Jojo Whitehead, RN Registered Nurse    Chary Bloom RN Registered Nurse    Loni Luciano, ISAAC Physical Therapist    Viola Espinoza RN Registered Nurse    Rajani Montilla RN Registered Nurse                  Physical Therapy Education       Title: PT OT SLP Therapies (In Progress)       Topic: Physical Therapy (In Progress)       Point: Mobility training (In Progress)       Learning Progress Summary            Patient Acceptance, E, NR by ML at 1/19/2025 1124   Family Acceptance, E, NR by ML at 1/19/2025 1124                      Point: Home exercise program (Not Started)       Learner Progress:  Not documented in this visit.              Point: Body mechanics (Not Started)       Learner Progress:  Not documented in this visit.              Point: Precautions (In Progress)       Learning Progress Summary            Patient Acceptance, E, NR by ML at 1/19/2025 1124   Family Acceptance, E, NR by ML at 1/19/2025 1124                                      User Key       Initials Effective Dates Name Provider Type Discipline     04/22/21 -  Debbi Hall Physical Therapist PT                    Recommendation and Plan  Anticipated Discharge Disposition (PT): inpatient rehabilitation facility  Planned Therapy Interventions (PT): balance training, bed mobility training, gait training, home exercise program, patient/family education, postural re-education, strengthening, transfer training, ROM (range of motion), neuromuscular re-education  Therapy Frequency (PT): daily  Plan of Care Reviewed With: patient   Progress: improving       Progress: improving  Outcome Evaluation: Patient with wound to L lateral trunk and sacrum. Wound bed with loose nonviable tissue and thickened slough on back and loose tissue on  trunk. . PT able to debride loose non-viable tissue/ slough. PT applied mist therapy treatment to improve blood flow, decreased inflammation and promote granulation. Patient would continue to benefit from mist therapy to improve wound healing  Plan of Care Reviewed With: patient            Time Calculation   PT Charges       Row Name 01/21/25 1015             Time Calculation    Start Time 1015  -KW         Untimed Charges    Wound Care 13829 Selective debridement  -KW      30495-Lcqtmdpti debridement 12  -KW      44017-WLPN Mist 26  -KW         Total Minutes    Untimed Charges Total Minutes 38  -KW       Total Minutes 38  -KW                User Key  (r) = Recorded By, (t) = Taken By, (c) = Cosigned By      Initials Name Provider Type    Loni Luciano PT Physical Therapist                      Therapy Charges for Today       Code Description Service Date Service Provider Modifiers Qty    35302102215 HC MERRICK DEBRIDE OPEN WOUND UP TO 20CM 1/21/2025 Loni Will, PT GP 1    93994610974 HC PT NLFU MIST 1/21/2025 Loni Will PT GP 1              PT G-Codes  Outcome Measure Options: AM-PAC 6 Clicks Daily Activity (OT)  AM-PAC 6 Clicks Score (PT): 8  AM-PAC 6 Clicks Score (OT): 9       Loni Will PT  1/21/2025

## 2025-01-21 NOTE — PLAN OF CARE
Goal Outcome Evaluation:      -Aox4  -Urena d/c, purewick in place.   -1L NC  -Heparin and insulin drip d/c  -BM x1, incontinent stool bag removed.  -Afebrile  -SO at bedside, Mom updated via phone

## 2025-01-21 NOTE — PROGRESS NOTES
"  INFECTIOUS DISEASES  INPATIENT PROGRESS NOTE  2025      PATIENT NAME: Natalia Arzate  :  1985  MRN:  [id number]  Date of Admission:  2025      Antimicrobials:  Linezolid - started 25 - stopped 25  Cefepime - started 25 - changed to meropenem 25      MAR reviewed.      Reason for consultation:  fevers, MRSA and ESBL Klebsiella pneumonia    Interval history:  Patient transferred out of the ICU.  Down to 1 L via nasal cannula supplemental oxygen.  Complains of a headache today.  Ongoing tube feeds via Corpak as failed swallowing evaluation.  She is been having some diarrhea today but no abdominal pain or cramping.  C. difficile PCR was negative.    ROS:  as noted      Objective:  Temp (24hrs), Av.5 °F (36.9 °C), Min:97.8 °F (36.6 °C), Max:99.4 °F (37.4 °C)    /93   Pulse 100   Temp 99.4 °F (37.4 °C) (Axillary)   Resp 20   Ht 162.6 cm (64.02\")   Wt (!) 164 kg (361 lb 1.6 oz)   LMP  (LMP Unknown) Comment: last menses 6 years ago (HCG negative today)  SpO2 94%   BMI 61.95 kg/m²            Physical Examination:  GENERAL: Acutely on chronically ill-appearing female.  Obese.  Awake, improved.  LINES: Left IJ CVL.  HEENT: Normocephalic, atraumatic.  Corpak in place.  CV: RRR. No murmur, rubs, gallops.  Normal S1S2.  LUNGS: Clear to auscultation bilaterally without wheezing, rales, rhonchi. Normal respiratory effort.  Decreased BS B.  ABDOMEN: Positive bowel sounds.  Soft, nontender, nondistended.  No rebound or guarding.  Large pannus.    EXT:  No edema.  SKIN: Warm and dry without rash or ulcer.  NEURO: awake and alert    Laboratory Data:    Results from last 7 days   Lab Units 25  1540 25  0658 25  0431   WBC 10*3/mm3 4.79 4.82 5.15   HEMOGLOBIN g/dL 9.6* 8.9* 8.1*   HEMATOCRIT % 30.9* 28.9* 26.7*   PLATELETS 10*3/mm3 215 210 184     Results from last 7 days   Lab Units 25  1540   SODIUM mmol/L 133*   POTASSIUM mmol/L 3.9   CHLORIDE mmol/L " 90*   CO2 mmol/L 30.0*   BUN mg/dL 12   CREATININE mg/dL 0.58   GLUCOSE mg/dL 207*   CALCIUM mg/dL 9.8     Results from last 7 days   Lab Units 01/18/25  0431   ALK PHOS U/L 124*   BILIRUBIN mg/dL 0.3   ALT (SGPT) U/L 19   AST (SGOT) U/L 53*         Results from last 7 days   Lab Units 01/17/25  1325   CRP mg/dL 4.07*       Estimated Creatinine Clearance: 204.3 mL/min (by C-G formula based on SCr of 0.58 mg/dL).                          Microbiology:    Microbiology Results (last 10 days)       Procedure Component Value - Date/Time    Clostridioides difficile Toxin - Stool, Per Rectum [[id number]]  (Normal) Collected: 01/21/25 1405    Lab Status: Final result Specimen: Stool from Per Rectum Updated: 01/21/25 1532    Narrative:      The following orders were created for panel order Clostridioides difficile Toxin - Stool, Per Rectum.  Procedure                               Abnormality         Status                     ---------                               -----------         ------                     Clostridioides difficile...[[id number]]  Normal              Final result                 Please view results for these tests on the individual orders.    Clostridioides difficile Toxin, PCR - Stool, Per Rectum [[id number]]  (Normal) Collected: 01/21/25 1405    Lab Status: Final result Specimen: Stool from Per Rectum Updated: 01/21/25 1532     Toxigenic C. difficile by PCR Not Detected    Narrative:      The result indicates the absence of toxigenic C. difficile from stool specimen.     Respiratory Culture - Sputum, ET Suction [[id number]]  (Abnormal) Collected: 01/14/25 1857    Lab Status: Final result Specimen: Sputum from ET Suction Updated: 01/17/25 1221     Respiratory Culture Light growth (2+) Klebsiella pneumoniae ESBL     Comment:   Consider infectious disease consult.  Susceptibility results may not correlate to clinical outcomes.         Light growth (2+) Staphylococcus aureus, MRSA     Comment:   Considering  site of infection and appropriate dosing, oxacillin (or cefoxitin) results can be applied to cefazolin, nafcillin, ampicillin/sulbactam, dicloxacillin, amoxicillin/clavulanate, cephalexin, and cefpodoxime.  Methicillin resistant Staphylococcus aureus, Patient may be an isolation risk.        Gram Stain Many (4+) WBCs per low power field      Rare (1+) Epithelial cells per low power field      Few (2+) Gram positive cocci in pairs, chains and clusters    Narrative:      Refer to LLL Wash culture for MICS.     Respiratory Culture - Wash, Lung, Left Lower Lobe [[id number]]  (Abnormal)  (Susceptibility) Collected: 01/14/25 1608    Lab Status: Final result Specimen: Wash from Lung, Left Lower Lobe Updated: 01/17/25 1221     Respiratory Culture Light growth (2+) Klebsiella pneumoniae ESBL     Comment:   Consider infectious disease consult.  Susceptibility results may not correlate to clinical outcomes.         Light growth (2+) Staphylococcus aureus, MRSA     Comment:   Considering site of infection and appropriate dosing, oxacillin (or cefoxitin) results can be applied to cefazolin, nafcillin, ampicillin/sulbactam, dicloxacillin, amoxicillin/clavulanate, cephalexin, and cefpodoxime.  Methicillin resistant Staphylococcus aureus, Patient may be an isolation risk.         Rare growth Normal Respiratory Margo     Gram Stain Many (4+) WBCs per low power field      Rare (1+) Epithelial cells per low power field      Rare (1+) Gram positive cocci in pairs and clusters    Susceptibility        Klebsiella pneumoniae ESBL      KELSIE      Ciprofloxacin Resistant      Ertapenem Susceptible      Levofloxacin Resistant      Meropenem Susceptible      Tetracycline Resistant      Trimethoprim + Sulfamethoxazole Resistant                       Susceptibility        Staphylococcus aureus, MRSA      KELSIE      Clindamycin Resistant      Linezolid Susceptible      Oxacillin Resistant      Tetracycline Susceptible      Trimethoprim +  Sulfamethoxazole Susceptible      Vancomycin Susceptible                       Susceptibility Comments       Klebsiella pneumoniae ESBL    With the exception of urinary-sourced infections, aminoglycosides should not be used as monotherapy.      Staphylococcus aureus, MRSA    This isolate is presumed to be clindamycin resistant based on detection of inducible clindamycin resistance.  Clindamycin may still be effective in some patients.  This isolate is presumed to be clindamycin resistant based on detection of inducible clindamycin resistance.  Clindamycin may still be effective in some patients.               Blood Culture - Blood, Blood, Arterial Line [[id number]]  (Abnormal) Collected: 01/14/25 1434    Lab Status: Final result Specimen: Blood, Arterial Line Updated: 01/17/25 0636     Blood Culture Staphylococcus, coagulase negative     Isolated from Anaerobic Bottle     Gram Stain Anaerobic Bottle Gram positive cocci in pairs and clusters    Narrative:      Probable contaminant requires clinical correlation, susceptibility not performed unless requested by physician.      Blood Culture ID, PCR - Blood, Blood, Arterial Line [[id number]]  (Abnormal) Collected: 01/14/25 1434    Lab Status: Final result Specimen: Blood, Arterial Line Updated: 01/16/25 0657     BCID, PCR Staph spp, not aureus or lugdunensis. Identification by BCID2 PCR.     BOTTLE TYPE Anaerobic Bottle    Blood Culture - Blood, Hand, Right [[id number]]  (Normal) Collected: 01/14/25 1430    Lab Status: Final result Specimen: Blood from Hand, Right Updated: 01/19/25 1500     Blood Culture No growth at 5 days    Narrative:      Less than seven (7) mL's of blood was collected.  Insufficient quantity may yield false negative results.                Radiology:  No radiology results for the last day        DISCUSSION:  39 y.o. female with history of factor V Leiden, obesity, and uncontrolled type 2 diabetes admitted with hyperglycemia.   Extended intubation with  14-day hospitalization, now with fever.     PROBLEM LIST:   -- Fever, hospital onset - improving on linezolid/cefepime.  With prolonged ICU stay at 2 weeks with new fever, suspect nosocomial infection.  Consider central line associated bloodstream infection, catheter associated urinary tract infection, ventilator associated pneumonia.  UA not collected.  Blood cultures with one bottle CoNS, likely contaminant.  Prior CVL removed.  BAL culture MRSA and ESBL Klebsiella pneumoniae, c/w HAP/VAP.  -- Acute hypoxic respiratory failure, intubated since 1/1/2025.    -- Bibasilar pulmonary infiltrates in the setting of fever and extended intubation, risk for ventilator associated pneumonia.  -- Indwelling Urena catheter, risk for catheter associated UTI.  -- Right IJ central line in place from outside ER, risk for CLABSI.  -- MRSA pneumonia, previously treated with vancomycin for 5 days, currently with linezolid.  -- Positive blood cultures.  Blood cultures x 2 from 1/1/2025 with 1 bottle positive for Gemella sanguinis and 1 bottle and the other set positive for coagulase-negative Staphylococcus.  CoNS likely contaminant.  Unclear significance for Gemella species - was treated with 10 days aminopenicillin (ampicillin followed by Unasyn).  Repeat blood cultures were no growth.  -- Obesity.  Skin appears intact at the fold without evidence for breakdown or infection.  -- Uncontrolled type 2 diabetes, contributing to propensity for infection and poor healing.  -- Factor V Leiden.  -- Listed penicillin allergy but tolerated ampicillin.  -- Diarrhea, C. difficile PCR negative.  Likely antibiotics associated and tube feeding also contributing.    PLAN:  -- s/p 7 days linezolid for MRSA HAP  -- Finish meropenem course tomorrow for ESBL Klebsiella pneumoniae   -- Add probiotic for diarrhea    Discussed with patient.    I will sign off at this time.  Please call back if I can be of further assistance.        Rj Boyd,  MD  1/21/2025  16:47 EST

## 2025-01-21 NOTE — PLAN OF CARE
Goal Outcome Evaluation:  Plan of Care Reviewed With: patient        Progress: improving  Outcome Evaluation: Patient with wound to L lateral trunk and sacrum. Wound bed with loose nonviable tissue and thickened slough on back and loose tissue on trunk. . PT able to debride loose non-viable tissue/ slough. PT applied mist therapy treatment to improve blood flow, decreased inflammation and promote granulation. Patient would continue to benefit from mist therapy to improve wound healing

## 2025-01-21 NOTE — PROGRESS NOTES
Clinical Nutrition     Patient Name: Natalia Arzate  YOB: 1985  MRN: [id number]  Date of Encounter: 25 12:02 EST  Admission date: 2025  Reason for Visit: Follow-up protocol, EN    Assessment   Nutrition Assessment   Admission Diagnosis:  Acute respiratory failure [J96.00]    Problem List:    Acute respiratory failure with hypercapnia    MARIAA (acute kidney injury)    Type 2 diabetes mellitus with hyperglycemia    Sepsis      PMH:   She  has a past medical history of A-fib, Abnormal ECG, Anemia, Anxiety, Asthma, Cancer, Depression, Diabetes mellitus, DVT (deep venous thrombosis), Factor 5 Leiden mutation, heterozygous, Fibroid, GERD (gastroesophageal reflux disease), Gout, H/O abdominal abscess, History of sepsis, History of transfusion, Hyperlipidemia, Hypothyroid, Kidney stone, Migraines, Neuropathy, Ovarian cancer (2021), Ovarian cyst, PE (pulmonary embolism), Polycystic ovary syndrome, Preeclampsia, Rh incompatibility, Stroke, TIA (transient ischemic attack), Urinary tract infection, and Varicella.    PSH:  She  has a past surgical history that includes  section; Cholecystectomy; Colonoscopy; Cardiac catheterization; Right oophorectomy; Abdominal surgery; Esophagogastroduodenoscopy; ureteroscopy laser lithotripsy with stent insertion (Left, 10/01/2021); Extracorporeal shock wave lithotripsy (Left, 10/22/2021); Lithotripsy; ureteroscopy laser lithotripsy with stent insertion (Left, 2022); ureteroscopy laser lithotripsy with stent insertion (Left, 2022); Nephrectomy (Left, 10/2022); Hysterectomy (); and Insert Central Line At Bedside (2025).    Substance history: Opioids    Applicable Nutrition History:     ARF/VENT  25  s/p Bronch 25  MARIAA- CRRT 24---stopped 1-10-25  HD done once 25  s/p Bronch 25  Extubated 25    SKIN: L flank -wound, sacrum/coccyx- areas of friction damage, area purple slow to becki    Labs     Labs Reviewed: Yes    Results from last 7 days   Lab Units 01/20/25  0658 01/19/25  0431 01/18/25  0431 01/17/25  1325 01/17/25  0526   GLUCOSE mg/dL 194* 164* 150* 138* 177*   BUN mg/dL 14 21* 25* 27* 29*   CREATININE mg/dL 0.61 0.66 0.66 0.71 0.71   SODIUM mmol/L 131* 134* 138 135* 137   CHLORIDE mmol/L 89* 93* 97* 93* 98   POTASSIUM mmol/L 3.7 3.9 3.8  3.8 3.5 3.7   PHOSPHORUS mg/dL  --   --  4.3 3.8 3.6   MAGNESIUM mg/dL 1.3* 1.5* 1.7 2.0 1.4*   ALT (SGPT) U/L  --   --  19 16 16       Results from last 7 days   Lab Units 01/18/25  0431 01/17/25  1325 01/17/25  0526   ALBUMIN g/dL 3.4* 3.6 3.3*   CRP mg/dL  --  4.07*  --    CHOLESTEROL mg/dL  --  147  --    TRIGLYCERIDES mg/dL  --  345*  --        Results from last 7 days   Lab Units 01/21/25  0530 01/21/25  0036 01/20/25  1741 01/20/25  1327 01/20/25  1028 01/20/25  0858   GLUCOSE mg/dL 221* 214* 199* 147* 160* 146*     Lab Results   Lab Value Date/Time    HGBA1C 9.10 (H) 01/01/2025 2133    HGBA1C 9.6 (H) 12/16/2024 1154    HGBA1C 9.9 (H) 10/15/2024 0505                   Medications    Medications Reviewed: yes    Scheduled Meds:amitriptyline, 25 mg, Oral, Nightly  amLODIPine, 10 mg, Oral, Daily  bumetanide, 1 mg, Intravenous, Daily  castor oil-balsam peru, 1 Application, Topical, Q12H  DULoxetine, 30 mg, Oral, Nightly  DULoxetine, 60 mg, Oral, Daily  enoxaparin, 120 mg, Subcutaneous, Q12H  gabapentin, 300 mg, Nasogastric, Q8H  insulin glargine, 30 Units, Subcutaneous, Q12H  insulin regular, 10 Units, Subcutaneous, Q6H  insulin regular, 4-24 Units, Subcutaneous, Q6H  meropenem, 1,000 mg, Intravenous, Q8H  metoclopramide, 10 mg, Intravenous, Q6H  nystatin, , Topical, Q12H      Continuous Infusions:     PRN Meds:.  acetaminophen    dextrose    glucagon (human recombinant)    HYDROcodone-acetaminophen    ipratropium    ipratropium-albuterol    Magnesium Standard Dose Replacement - Follow Nurse / BPA Driven Protocol    midazolam    ondansetron    oxyCODONE     "Polyvinyl Alcohol-Povidone PF    Potassium Replacement - Follow Nurse / BPA Driven Protocol    sodium chloride    sodium chloride    Intake/Ouptut 24 hrs (0701 - 0700)   I&O's Reviewed: yes    Intake & Output (last day)         01/20 0701 01/21 0700 01/21 0701 01/22 0700    I.V. (mL/kg)      Other 607     NG/     IV Piggyback 100     Total Intake(mL/kg) 1242 (7.6)     Urine (mL/kg/hr) 1250 (0.3)     Stool 0     Total Output 1250     Net -8           Stool Unmeasured Occurrence 5 x            Anthropometrics     Height: Height: 162.6 cm (64.02\")64in  Last Filed Weight: Weight: (!) 164 kg (361 lb 1.6 oz) (01/21/25 0526)350lb  Method: Weight Method: Bed scaleest  BMI: BMI (Calculated): 6260    UBW: last MD office weight 336lb    Weight change:  ? 27lb wt gain since January     Weight       Weight (kg) Weight (lbs) Weight Method Visit Report   5/24/2023 129.729 kg  286 lb  Stated  --     127.007 kg  280 lb   --    6/24/2023 127.007 kg  280 lb  Stated     7/6/2023 --  --   --    7/26/2023 129.275 kg  285 lb      7/27/2023 132.904 kg  293 lb   --    8/15/2023 124.739 kg  275 lb  Stated     8/17/2023 127.007 kg  280 lb  Stated     9/10/2023 129.275 kg  285 lb  Stated     10/6/2023 140.161 kg (H)  309 lb (H)   --    11/7/2023 133.811 kg  295 lb  Stated     11/20/2023 133.811 kg  295 lb      11/24/2023 133.358 kg  294 lb      11/26/2023 133.811 kg  295 lb  Stated     1/2/2024 140.161 kg (H)  309 lb (H)  Stated     2/13/2024 145.605 kg (H)  321 lb (H)  Stated     3/12/2024 --  --   --    4/16/2024 142.611 kg (H)  314 lb 6.4 oz (H)   --    4/18/2024 142.429 kg (H)  314 lb (H)  Stated     4/26/2024 138.801 kg (H)  306 lb (H)  Stated     5/1/2024 138.801 kg (H)  306 lb (H)   --    5/6/2024 146.512 kg (H)  323 lb (H)   --    5/13/2024 146.512 kg (H)  323 lb (H)  Stated     7/8/2024 142.883 kg (H)  315 lb (H)  Stated     7/31/2024 147.691 kg (H)  325 lb 9.6 oz (H)   --    9/22/2024 143.79 kg (H)  317 lb (H)  Stated   "   12/3/2024 153.588 kg (H)  338 lb 9.6 oz (H)   --    12/16/2024 153.316 kg (H)  338 lb (H)   --     152.409 kg (H)  336 lb (H)   --    1/1/2025 158.759 kg (H)  350 lb (H)  Estimated        Legend:  (H) High  Nutrition Focused Physical Exam     Date:     Unable to perform due to Pt unable to participate at time of visit     Subjective   Reported/Observed/Food/Nutrition Related History:     1/21  Pt resting in bed, states she is tolerating current TF regimen. Pt had FEES, recommending NPO. RN states pt started on reglan today and compazine dc'd.     1-20: per RN: pt had nausea vomiting over weekend, TF decreased to trophic rate pt tolerating, plan for FEES today  Keofeed no longer in small bowel has migrated back into stomach per last KUB  Pt resting in bed, on nasal cannula, is alert, oriented, states she has vertigo, and get sick when she is rolled in the bed    1-16: pt intubated, sedated   + fentanyl, precedex, heparin, insulin  Per RN: pt tolerating TF, had small bm last night    1-14-25: pt intubated, sedated, fiancee at bedside  + fentanyl, precedex, insulin  Per RN: TF not restarted yesterday, unclear why, plan for bronch today  Plan to resume TF s/p Bronch    1-10: pt intubated sedated, remains on CRRT  + fentanyl, heparin  Per RN: pt has been off pressors since last night, is less puffy, able to tolerate turning, has not had a bm, had good bowel sound  Per MD: ok to resume TF    1-8: pt intubated, sedated + fentanyl, versed,levophed 0.06mcg, bicarb @75ml/hr  Per RN: pt had 800ml GRV last night TF on hold, is super acidotic, line placed for dialysis, plan to start CRRT     1-6: pt intubated, sedated, fiancee at bedside  + fentanyl, versed, insulin  Per RN:pt s/p emergent bronch Saturday,  trophic feed started yesterday  Per MD ok to advance TF    1-3: Pt intubated, sedated, fiancee at bedside  + insulin, fentanyl, versed, heparin, amio  Per RN: pt had wide complex rhythm last night, lokelma on hold as K+  "wnl, 103 temp  Per MD ok to start TF,  place keofeed    Needs Assessment   Date:25    Height used:Height: 162.6 cm (64.02\")64in  Weights used/ ABW: 336lb/ 153kg   IBW: 120lb/ 55kg    Estimated Calorie needs: ~1500kcal  Method:  22-25Kcals/KG IBW: 1210-1375kcal  Method:  MSJ ABW: 2195kcal    Estimated Protein needs: ~ 138g protein  Method: 2.5g protein per kg IBW: 138g protein  Method: 0.8-1g protein per kg ABW: 122-153g protein    Current Nutrition Prescription     PO: No diet orders on file  Oral Nutrition Supplement:  Intake: N/A    EN: Peptamen Intense VHP  Goal Rate: 75ml  Water Flushes: 25ml  Modular: None  Route: NG   (keofeed now in stomach per KUB 25)  Tube: Small bore    At goal over: 22Hrs/day     Rx will supply:   Goal Volume 1650  mL/day     Flush Volume 550 mL/day     Energy 1640 Kcal/day 109 % Est Need   Protein 154 g/day 112 % Est Need   Fiber 7 g/day     Water in  EN 1386 mL     Total Water 1936 mL     Meet DRI Yes        --------------------------------------------------------------------------  Product/Rate verified at bedside: Yes  Infusing Rate at time of visit: 20ml    Average Delivery from [facility] Day:   Volume 320 mL/day 21  % Goal Vol.   Flush Volume 425 mL/day     Energy  Kcal/day  % Est Need   Protein  g/day  % Est Need   Fiber  g/day     Water in  EN  mL     Total Water  mL     Meet DRI No           Assessment & Plan   Nutrition Diagnosis   Date: 1-3-25  Updated: 25  Problem Inadequate energy intake    Etiology ARF/Extubated   Signs/Symptoms 21% goal volume EN   Status: Resolved     Goal:   Nutrition to support treatment and Tolerate EN at goal      Nutrition Intervention      Follow treatment progress, Care plan reviewed    Recommend slowly advance EN back to goal:  Peptamen VHP @ 20ml/hr advance 10ml q 8hrs to goal @ 70ml/hr. Water flush @ 25ml/hr.   Infused x 22 hrs provides 1540ml, 1540kcals (103% est needs), 144g pro (104% est needs), 7g fiber, 1294ml water from " formula, +550ml water from flushes, 1844ml TFW    If nausea continues, would recommend advance keofeed post-pyloric    Monitoring/Evaluation:   Per protocol, I&O, Pertinent labs, Weight, Skin status, GI status, Symptoms    Stephanie Henderson, MS,RD,LD  Time Spent: 30min

## 2025-01-21 NOTE — NURSING NOTE
WOC follow-up for left flank wound.    Patient's wound has improved/changed since initiation of mist therapy.  The nonviable tissue now removed.  Within the abdominal fold there is now a whitish colored tissue.  Does appear to be some pink epithelial buds inter mixed within the wound bed. Vashe soak applied by primary nurse. Multilayer xeroform applied. Cover with allevyn dressing.     Continue with MIST therapy. Will modify wound care orders.         Pressure injury prevention protocol. Turn q 2 hours. Allevyn dressing to sacrum and heels.     WO will continue to follow.     Miles Tanner RN, BSN, CWOCN  Wound, Ostomy and Continence (WOC) Department  [facility]

## 2025-01-22 ENCOUNTER — APPOINTMENT (OUTPATIENT)
Dept: GENERAL RADIOLOGY | Facility: HOSPITAL | Age: 40
DRG: 870 | End: 2025-01-22
Payer: COMMERCIAL

## 2025-01-22 LAB
ANION GAP SERPL CALCULATED.3IONS-SCNC: 12 MMOL/L (ref 5–15)
BASOPHILS # BLD AUTO: 0.03 10*3/MM3 (ref 0–0.2)
BASOPHILS NFR BLD AUTO: 0.8 % (ref 0–1.5)
BUN SERPL-MCNC: 13 MG/DL (ref 6–20)
BUN/CREAT SERPL: 24.1 (ref 7–25)
CALCIUM SPEC-SCNC: 9.9 MG/DL (ref 8.6–10.5)
CHLORIDE SERPL-SCNC: 90 MMOL/L (ref 98–107)
CO2 SERPL-SCNC: 29 MMOL/L (ref 22–29)
CREAT SERPL-MCNC: 0.54 MG/DL (ref 0.57–1)
DEPRECATED RDW RBC AUTO: 58.5 FL (ref 37–54)
EGFRCR SERPLBLD CKD-EPI 2021: 121 ML/MIN/1.73
EOSINOPHIL # BLD AUTO: 0.22 10*3/MM3 (ref 0–0.4)
EOSINOPHIL NFR BLD AUTO: 5.6 % (ref 0.3–6.2)
ERYTHROCYTE [DISTWIDTH] IN BLOOD BY AUTOMATED COUNT: 17.9 % (ref 12.3–15.4)
GLUCOSE BLDC GLUCOMTR-MCNC: 199 MG/DL (ref 70–130)
GLUCOSE BLDC GLUCOMTR-MCNC: 210 MG/DL (ref 70–130)
GLUCOSE BLDC GLUCOMTR-MCNC: 225 MG/DL (ref 70–130)
GLUCOSE BLDC GLUCOMTR-MCNC: 233 MG/DL (ref 70–130)
GLUCOSE SERPL-MCNC: 215 MG/DL (ref 65–99)
HCT VFR BLD AUTO: 31.2 % (ref 34–46.6)
HGB BLD-MCNC: 9.8 G/DL (ref 12–15.9)
IMM GRANULOCYTES # BLD AUTO: 0.04 10*3/MM3 (ref 0–0.05)
IMM GRANULOCYTES NFR BLD AUTO: 1 % (ref 0–0.5)
LYMPHOCYTES # BLD AUTO: 0.53 10*3/MM3 (ref 0.7–3.1)
LYMPHOCYTES NFR BLD AUTO: 13.6 % (ref 19.6–45.3)
MAGNESIUM SERPL-MCNC: 1 MG/DL (ref 1.6–2.6)
MCH RBC QN AUTO: 29.1 PG (ref 26.6–33)
MCHC RBC AUTO-ENTMCNC: 31.4 G/DL (ref 31.5–35.7)
MCV RBC AUTO: 92.6 FL (ref 79–97)
MONOCYTES # BLD AUTO: 0.65 10*3/MM3 (ref 0.1–0.9)
MONOCYTES NFR BLD AUTO: 16.7 % (ref 5–12)
NEUTROPHILS NFR BLD AUTO: 2.43 10*3/MM3 (ref 1.7–7)
NEUTROPHILS NFR BLD AUTO: 62.3 % (ref 42.7–76)
NRBC BLD AUTO-RTO: 0 /100 WBC (ref 0–0.2)
PHOSPHATE SERPL-MCNC: 4.2 MG/DL (ref 2.5–4.5)
PLATELET # BLD AUTO: 195 10*3/MM3 (ref 140–450)
PMV BLD AUTO: 9.3 FL (ref 6–12)
POTASSIUM SERPL-SCNC: 4 MMOL/L (ref 3.5–5.2)
QT INTERVAL: 388 MS
QTC INTERVAL: 485 MS
RBC # BLD AUTO: 3.37 10*6/MM3 (ref 3.77–5.28)
SODIUM SERPL-SCNC: 131 MMOL/L (ref 136–145)
WBC NRBC COR # BLD AUTO: 3.9 10*3/MM3 (ref 3.4–10.8)

## 2025-01-22 PROCEDURE — 25010000002 MAGNESIUM SULFATE 2 GM/50ML SOLUTION: Performed by: PEDIATRICS

## 2025-01-22 PROCEDURE — 85025 COMPLETE CBC W/AUTO DIFF WBC: CPT | Performed by: PEDIATRICS

## 2025-01-22 PROCEDURE — 84100 ASSAY OF PHOSPHORUS: CPT | Performed by: PEDIATRICS

## 2025-01-22 PROCEDURE — 63710000001 INSULIN GLARGINE PER 5 UNITS: Performed by: INTERNAL MEDICINE

## 2025-01-22 PROCEDURE — 83735 ASSAY OF MAGNESIUM: CPT | Performed by: PEDIATRICS

## 2025-01-22 PROCEDURE — 25010000002 ENOXAPARIN PER 10 MG: Performed by: INTERNAL MEDICINE

## 2025-01-22 PROCEDURE — 25010000002 MEROPENEM PER 100 MG: Performed by: INTERNAL MEDICINE

## 2025-01-22 PROCEDURE — 63710000001 INSULIN REGULAR HUMAN PER 5 UNITS: Performed by: INTERNAL MEDICINE

## 2025-01-22 PROCEDURE — 25010000002 BUMETANIDE PER 0.5 MG: Performed by: INTERNAL MEDICINE

## 2025-01-22 PROCEDURE — 82948 REAGENT STRIP/BLOOD GLUCOSE: CPT

## 2025-01-22 PROCEDURE — 80048 BASIC METABOLIC PNL TOTAL CA: CPT | Performed by: PEDIATRICS

## 2025-01-22 PROCEDURE — 25010000002 METOCLOPRAMIDE PER 10 MG: Performed by: PEDIATRICS

## 2025-01-22 PROCEDURE — 92526 ORAL FUNCTION THERAPY: CPT

## 2025-01-22 PROCEDURE — 99232 SBSQ HOSP IP/OBS MODERATE 35: CPT | Performed by: PEDIATRICS

## 2025-01-22 PROCEDURE — 74018 RADEX ABDOMEN 1 VIEW: CPT

## 2025-01-22 PROCEDURE — 25010000002 MAGNESIUM SULFATE 4 GM/100ML SOLUTION: Performed by: PEDIATRICS

## 2025-01-22 PROCEDURE — 97530 THERAPEUTIC ACTIVITIES: CPT

## 2025-01-22 PROCEDURE — 97110 THERAPEUTIC EXERCISES: CPT

## 2025-01-22 RX ORDER — MAGNESIUM SULFATE HEPTAHYDRATE 40 MG/ML
4 INJECTION, SOLUTION INTRAVENOUS EVERY 4 HOURS
Status: DISPENSED | OUTPATIENT
Start: 2025-01-22 | End: 2025-01-23

## 2025-01-22 RX ORDER — L.ACID,PARA/B.BIFIDUM/S.THERM 8B CELL
1 CAPSULE ORAL DAILY
Status: DISCONTINUED | OUTPATIENT
Start: 2025-01-22 | End: 2025-01-23

## 2025-01-22 RX ORDER — MECLIZINE HYDROCHLORIDE 25 MG/1
25 TABLET ORAL 3 TIMES DAILY PRN
Status: DISCONTINUED | OUTPATIENT
Start: 2025-01-22 | End: 2025-02-03 | Stop reason: HOSPADM

## 2025-01-22 RX ORDER — MAGNESIUM SULFATE HEPTAHYDRATE 40 MG/ML
2 INJECTION, SOLUTION INTRAVENOUS
Status: COMPLETED | OUTPATIENT
Start: 2025-01-22 | End: 2025-01-22

## 2025-01-22 RX ADMIN — METOCLOPRAMIDE HYDROCHLORIDE 10 MG: 10 INJECTION, SOLUTION INTRAMUSCULAR; INTRAVENOUS at 10:45

## 2025-01-22 RX ADMIN — OXYCODONE HYDROCHLORIDE 5 MG: 5 SOLUTION ORAL at 15:22

## 2025-01-22 RX ADMIN — ENOXAPARIN SODIUM 120 MG: 120 INJECTION SUBCUTANEOUS at 12:00

## 2025-01-22 RX ADMIN — OXYCODONE HYDROCHLORIDE 5 MG: 5 SOLUTION ORAL at 09:43

## 2025-01-22 RX ADMIN — GABAPENTIN 300 MG: 300 CAPSULE ORAL at 23:21

## 2025-01-22 RX ADMIN — INSULIN HUMAN 8 UNITS: 100 INJECTION, SOLUTION PARENTERAL at 18:05

## 2025-01-22 RX ADMIN — INSULIN HUMAN 10 UNITS: 100 INJECTION, SOLUTION PARENTERAL at 01:54

## 2025-01-22 RX ADMIN — GABAPENTIN 300 MG: 300 CAPSULE ORAL at 06:21

## 2025-01-22 RX ADMIN — DULOXETINE HYDROCHLORIDE 30 MG: 30 CAPSULE, DELAYED RELEASE ORAL at 23:21

## 2025-01-22 RX ADMIN — HYDROCODONE BITARTRATE AND ACETAMINOPHEN 1 TABLET: 5; 325 TABLET ORAL at 12:51

## 2025-01-22 RX ADMIN — ACETAMINOPHEN 650 MG: 650 SOLUTION ORAL at 10:45

## 2025-01-22 RX ADMIN — ENOXAPARIN SODIUM 120 MG: 120 INJECTION SUBCUTANEOUS at 23:21

## 2025-01-22 RX ADMIN — Medication 1 CAPSULE: at 12:51

## 2025-01-22 RX ADMIN — MEROPENEM 1000 MG: 1 INJECTION, POWDER, FOR SOLUTION INTRAVENOUS at 12:49

## 2025-01-22 RX ADMIN — AMITRIPTYLINE HYDROCHLORIDE 25 MG: 25 TABLET, FILM COATED ORAL at 23:21

## 2025-01-22 RX ADMIN — MEROPENEM 1000 MG: 1 INJECTION, POWDER, FOR SOLUTION INTRAVENOUS at 06:21

## 2025-01-22 RX ADMIN — Medication 1 APPLICATION: at 02:23

## 2025-01-22 RX ADMIN — INSULIN HUMAN 4 UNITS: 100 INJECTION, SOLUTION PARENTERAL at 01:54

## 2025-01-22 RX ADMIN — INSULIN HUMAN 8 UNITS: 100 INJECTION, SOLUTION PARENTERAL at 12:52

## 2025-01-22 RX ADMIN — ENOXAPARIN SODIUM 120 MG: 120 INJECTION SUBCUTANEOUS at 01:54

## 2025-01-22 RX ADMIN — MAGNESIUM SULFATE IN WATER FOR 4 G: 40 INJECTION INTRAVENOUS at 16:15

## 2025-01-22 RX ADMIN — INSULIN HUMAN 10 UNITS: 100 INJECTION, SOLUTION PARENTERAL at 12:51

## 2025-01-22 RX ADMIN — METOCLOPRAMIDE HYDROCHLORIDE 10 MG: 10 INJECTION, SOLUTION INTRAMUSCULAR; INTRAVENOUS at 06:22

## 2025-01-22 RX ADMIN — METOCLOPRAMIDE HYDROCHLORIDE 10 MG: 10 INJECTION, SOLUTION INTRAMUSCULAR; INTRAVENOUS at 18:03

## 2025-01-22 RX ADMIN — ACETAMINOPHEN 650 MG: 650 SOLUTION ORAL at 18:04

## 2025-01-22 RX ADMIN — Medication 1 APPLICATION: at 09:44

## 2025-01-22 RX ADMIN — HYDROCODONE BITARTRATE AND ACETAMINOPHEN 1 TABLET: 5; 325 TABLET ORAL at 18:04

## 2025-01-22 RX ADMIN — OXYCODONE HYDROCHLORIDE 5 MG: 5 SOLUTION ORAL at 02:12

## 2025-01-22 RX ADMIN — AMLODIPINE BESYLATE 10 MG: 10 TABLET ORAL at 09:44

## 2025-01-22 RX ADMIN — MAGNESIUM SULFATE HEPTAHYDRATE 2 G: 2 INJECTION, SOLUTION INTRAVENOUS at 15:21

## 2025-01-22 RX ADMIN — MECLIZINE HYDROCHLORIDE 25 MG: 25 TABLET ORAL at 12:51

## 2025-01-22 RX ADMIN — BUMETANIDE 1 MG: 0.25 INJECTION INTRAMUSCULAR; INTRAVENOUS at 09:44

## 2025-01-22 RX ADMIN — METOPROLOL SUCCINATE 50 MG: 50 TABLET, EXTENDED RELEASE ORAL at 23:20

## 2025-01-22 RX ADMIN — GABAPENTIN 300 MG: 300 CAPSULE ORAL at 15:10

## 2025-01-22 RX ADMIN — HYDROCODONE BITARTRATE AND ACETAMINOPHEN 1 TABLET: 5; 325 TABLET ORAL at 23:20

## 2025-01-22 RX ADMIN — INSULIN HUMAN 10 UNITS: 100 INJECTION, SOLUTION PARENTERAL at 06:22

## 2025-01-22 RX ADMIN — INSULIN HUMAN 8 UNITS: 100 INJECTION, SOLUTION PARENTERAL at 06:55

## 2025-01-22 RX ADMIN — METOCLOPRAMIDE HYDROCHLORIDE 10 MG: 10 INJECTION, SOLUTION INTRAMUSCULAR; INTRAVENOUS at 23:21

## 2025-01-22 RX ADMIN — DULOXETINE HYDROCHLORIDE 60 MG: 60 CAPSULE, DELAYED RELEASE ORAL at 09:43

## 2025-01-22 RX ADMIN — NYSTATIN: 100000 POWDER TOPICAL at 09:45

## 2025-01-22 RX ADMIN — INSULIN GLARGINE 30 UNITS: 100 INJECTION, SOLUTION SUBCUTANEOUS at 09:43

## 2025-01-22 RX ADMIN — METOPROLOL SUCCINATE 50 MG: 50 TABLET, EXTENDED RELEASE ORAL at 09:44

## 2025-01-22 RX ADMIN — INSULIN HUMAN 10 UNITS: 100 INJECTION, SOLUTION PARENTERAL at 18:03

## 2025-01-22 RX ADMIN — Medication 250 MG: at 09:44

## 2025-01-22 NOTE — PLAN OF CARE
Goal Outcome Evaluation:  Plan of Care Reviewed With: patient        Progress: no change  Outcome Evaluation: Patient continues to be limited by weakness and poor activity tolerance. Good effort given towards LE strengthening and bed mobility. Will continue to progress as tolerated.    Anticipated Discharge Disposition (PT): inpatient rehabilitation facility

## 2025-01-22 NOTE — THERAPY TREATMENT NOTE
Acute Care - Speech Language Pathology   Swallow Treatment Note [facility]     Patient Name: Natalia Arzate  : 1985  MRN: [id number]  Today's Date: 2025               Admit Date: 2025    Visit Dx:     ICD-10-CM ICD-9-CM   1. Respiratory acidosis with metabolic acidosis  E87.4 276.3   2. Acute respiratory failure with hypoxia  J96.01 518.81   3. MARIAA (acute kidney injury)  N17.9 584.9   4. Pharyngeal dysphagia  R13.13 787.23     Patient Active Problem List   Diagnosis    Nephrolithiasis    Ovarian teratoma, right    Other chronic pain    Pelvic pain    History of DVT in adulthood    History of pulmonary embolism    Ureteral calculus    Ischemic cerebrovascular accident (CVA)    Primary hypertension    Left lumbar radiculopathy    PTSD (post-traumatic stress disorder)    MARIAA (acute kidney injury)    Anemia    Dyslipidemia    Hypothyroid    Other secondary gout, multiple sites    Factor 5 Leiden mutation, heterozygous    Vitamin D deficiency    Vitamin B 12 deficiency    Type 2 diabetes mellitus with hyperglycemia    Hoarseness, persistent    Ureterolithiasis    Acute cystitis without hematuria    Hepatic steatosis    Right flank pain, chronic    Detrusor instability of bladder    Frequency of micturition    Acute respiratory failure with hypercapnia    Hyperkalemia    Sepsis    Respiratory acidosis with metabolic acidosis    Acute respiratory failure    Diabetes mellitus type 2, insulin dependent    Diabetic peripheral neuropathy associated with type 2 diabetes mellitus     Past Medical History:   Diagnosis Date    A-fib     Abnormal ECG     Anemia     Anxiety     Asthma     Cancer     Ovarian    Depression     Diabetes mellitus     DVT (deep venous thrombosis)     Factor 5 Leiden mutation, heterozygous     Fibroid     GERD (gastroesophageal reflux disease)     Gout     H/O abdominal abscess     History of sepsis     History of transfusion     Hyperlipidemia     Hypothyroid     Kidney stone      Migraines     Neuropathy     Ovarian cancer 2021    Ovarian cyst     PE (pulmonary embolism)     Polycystic ovary syndrome     Preeclampsia     Rh incompatibility     Stroke     TIA (transient ischemic attack)     Urinary tract infection     Varicella      Past Surgical History:   Procedure Laterality Date    ABDOMINAL SURGERY      CARDIAC CATHETERIZATION       SECTION      CHOLECYSTECTOMY      COLONOSCOPY      ENDOSCOPY      EXTRACORPOREAL SHOCK WAVE LITHOTRIPSY (ESWL) Left 10/22/2021    Procedure: EXTRACORPOREAL SHOCKWAVE LITHOTRIPSY;  Surgeon: Milan Motley MD;  Location: [facility] OR;  Service: Urology;  Laterality: Left;    HYSTERECTOMY  2017    ovarian ca    INSERT CENTRAL LINE AT BEDSIDE  2025    LITHOTRIPSY      NEPHRECTOMY Left 10/2022    RIGHT OOPHORECTOMY      URETEROSCOPY LASER LITHOTRIPSY WITH STENT INSERTION Left 10/01/2021    Procedure: URETEROSCOPY WITH STENT PLACEMENT;  Surgeon: Milan Motley MD;  Location: [facility] OR;  Service: Urology;  Laterality: Left;    URETEROSCOPY LASER LITHOTRIPSY WITH STENT INSERTION Left 2022    Procedure: URETEROSCOPY STENT REMOVAL;  Surgeon: Milan Motley MD;  Location: [facility] OR;  Service: Urology;  Laterality: Left;    URETEROSCOPY LASER LITHOTRIPSY WITH STENT INSERTION Left 2022    Procedure: CYSTOSCOPY RETROGRADE LEFT WITH STENT PLACEMENT;  Surgeon: Milan Motley MD;  Location: [facility] OR;  Service: Urology;  Laterality: Left;       SLP Recommendation and Plan     SLP Diet Recommendation: NPO, temporary alternate methods of nutrition/hydration, other (see comments) (Ok for 3-4 ice chips/hr after oral care as tolerated) (25)  Recommended Precautions and Strategies: general aspiration precautions (25)  SLP Rec. for Method of Medication Administration: meds via alternate route (25)     Monitor for Signs of Aspiration: yes, notify SLP if any concerns (25)         Anticipated Discharge Disposition (SLP): inpatient rehabilitation facility (01/22/25 1625)     Therapy Frequency (Swallow): 5 days per week (01/22/25 1625)  Predicted Duration Therapy Intervention (Days): 1 week (01/22/25 1625)  Oral Care Recommendations: Oral Care BID/PRN, Suction toothbrush (01/22/25 1625)        Daily Summary of Progress (SLP): progress toward functional goals as expected (01/22/25 1625)               Treatment Assessment (SLP): suspected, continued, pharyngeal dysphagia (01/22/25 1625)  Treatment Assessment Comments (SLP): Pt noted w/ hoarse vocal quality & decreased intensity. Reviewed/completed all exercises. Provided handout and encouraged independent practice. Pt w/ intermittent cough & wet vocal quality w/ thin liquid trials. Rec cont NPO w/ NG, ok for 3-4 ice chips/hr after oral care as tolerated. SLP will f/u & monitor for appropriateness for repeat FEES (01/22/25 1625)  Plan for Continued Treatment (SLP): continue treatment per plan of care (01/22/25 1625)                SWALLOW EVALUATION (Last 72 Hours)       SLP Adult Swallow Evaluation       Row Name 01/22/25 1625 01/20/25 1320 01/20/25 1105             Rehab Evaluation    Document Type therapy note (daily note)  - evaluation  - evaluation  -      Subjective Information no complaints  - no complaints  - no complaints  -      Patient Observations alert;cooperative  - alert;cooperative  - alert;cooperative  -      Patient/Family/Caregiver Comments/Observations family present  - family present  - Family present  -      Patient Effort good  - good  - good  -      Symptoms Noted During/After Treatment none  - none  - none  -         General Information    Patient Profile Reviewed -- yes  - yes  -      Pertinent History Of Current Problem -- See initial eval, pt referred for FEES  - Adm w/ MRSA PNA &  worsening renal failure, requiring CRRT. Intubated 1/1-1/17. Hx: HLD, hypothyroidism, a-fib,  PE/DVT, Factor V Leiden mutation, CVA. CXR: improving vascular congestion & persistent L basilar atelectasis w/ small L pleural effusion  -      Current Method of Nutrition -- NPO;nasogastric feedings  - NPO;nasogastric feedings  -      Precautions/Limitations, Vision -- WFL;for purposes of eval  - WFL;for purposes of [facility]  -      Precautions/Limitations, Hearing -- WFL;for purposes of eval  [facility]      Prior Level of Function-Communication -- unknown  - unknown  -      Prior Level of Function-Swallowing -- other (see comments)  per chart, pt w/ remote hx pharyngeal dysphagia in 2022  - other (see comments)  per chart, pt w/ remote hx pharyngeal dysphagia in 2022  -      Plans/Goals Discussed with -- patient;family;agreed upon  - patient;family;agreed upon  -      Barriers to Rehab -- medically complex  - medically complex  -      Patient's Goals for Discharge -- patient did not state  - patient did not state  [facility]      Family Goals for Discharge -- family did not state  - family did not state  [facility]         Pain    Additional Documentation Pain Scale: FACES Pre/Post-Treatment (Group)  - Pain Scale: FACES Pre/Post-Treatment (Group)  - Pain Scale: FACES Pre/Post-Treatment (Group)  -         Pain Scale: FACES Pre/Post-Treatment    Pain: FACES Scale, Pretreatment 0-->no hurt  - 0-->no hurt  - 0-->no hurt  -      Posttreatment Pain Rating 0-->no hurt  - 0-->no hurt  - 0-->no hurt  -         Oral Motor Structure and Function    Secretion Management -- -- WNL/WFL  -      Mucosal Quality -- -- moist, healthy  -         Oral Musculature and Cranial Nerve Assessment    Oral Motor General Assessment -- -- vocal impairment  -      Vocal Impairment, Detail. Cranial Nerve X (Vagus) -- -- other (see comments)  Hoarse, decreased vocal intensity  -         General Eating/Swallowing Observations    Respiratory Support Currently in Use -- -- room air  -       Eating/Swallowing Skills -- -- self-fed;fed by SLP  -      Positioning During Eating -- -- upright 90 degree;upright in bed  -      Utensils Used -- -- spoon;cup;straw  -      Consistencies Trialed -- -- ice chips;thin liquids;nectar/syrup-thick liquids;pureed  -         Respiratory    Date of Intubation -- -- 1/1-1/17  -         Clinical Swallow Eval    Pharyngeal Phase -- -- suspected pharyngeal impairment  -      Clinical Swallow Evaluation Summary -- -- Pt w/ overt s/s of aspiration c/b intermittent cough w/ thin liquid trials. No glaring s/s of aspiration w/ NTL or pureed trials. Although no overt s/s of aspiration w/ NTL or pureed trials, pt felt to be @ risk of aspiration given prolonged intubation/ prior respiratory status.  (MBS x2 completed @ [facility] in 2022, pt initially w/ moderate pharyngeal dysphagia. MBS completed later in [facility] revealed mild pharyngeal dysphagia w/ recs for regular diet w/ thin liquids) Recommend cont NPO w/ NG. Ok for 3-4 ice chips/hr after oral care. Plan for FEES this PM  -         Pharyngeal Phase Concerns    Pharyngeal Phase Concerns -- -- cough  -      Cough -- -- thin  -         Fiberoptic Endoscopic Evaluation of Swallowing (FEES)    Risks/Benefits Reviewed -- risks/benefits explained;patient;family;agreed to eval  - --      Nasal Entry -- right:  - --      Scope serial number/identification -- [id number]  - --         Anatomy and Physiology    Anatomic Considerations -- edema;erythema;vocal folds;false vocal folds;arytenoids  - --      Velopharyngeal -- CNA  - --      Base of Tongue -- symmetrical  [facility] --      Epiglottis -- edematous  - --      Laryngeal Function Breathing -- symmetrical  [facility] --      Laryngeal Function Phonation -- [facility] --      Laryngeal Function to Breath Hold -- TVF/FVF/Arytenoid;can't sustain closure  - --      Secretion Rating Scale (Apollo et alJaya 1996) -- 2- secretions initially outside the vestibule but later entered  the vestibule  - --      Secretion Description -- thin;clear  - --      Ice Chips -- elicited swallow;partially cleared secretions;aspirated;no response to aspiration  - --      Spontaneous Swallow -- frequency reduced  - --      Sensory -- sensed scope  - --      Utensils Used -- Spoon;Cup  - --      Consistencies Trialed -- spoon;cup;ice chips;thin liquids;honey-thick liquids;pudding/puree  - --         FEES Interpretation    Oral Phase -- oral residue present;prespill of liquids into pharynx;solids not tested  - --         Initiation of Pharyngeal Swallow    Initiation of Pharyngeal Swallow -- bolus in pyriform sinuses  - --      Pharyngeal Phase -- impaired pharyngeal phase of swallowing  - --      Penetration Before the Swallow -- thin liquids;secondary to reduced back of tongue control;secondary to delayed swallow initiation or mistiming  - --      Penetration During the Swallow -- thin liquids;secondary to reduced vestibular closure;secondary to delayed swallow initiation or mistiming;secondary to reduced laryngeal elevation  - --      Penetration After the Swallow -- honey-thick liquids;pudding/puree;secondary to residue;in valleculae;in pyriform sinuses  - --      Aspiration After the Swallow -- thin liquids;honey-thick liquids;pudding/puree;secondary to residue;in valleculae;in pyriform sinuses;in laryngeal vestibule  - --      Depth of Penetration -- deep  - --      Response to Penetration -- No  -MH --      No spontaneous response to penetration and -- non-effective laryngeal clearance with cue (see comments);other (see comments)  cued cough  - --      Response to Aspiration -- No  -MH --      No spontaneous response to aspiration with -- non-effective subglottic clearance with cue (see comments);other (see comments)  cued cough  - --      Rosenbek's Scale -- thin:;honey:;pudding/puree:;8-->Level 8  - --      Residue -- all consistencies tested;diffuse within  pharynx;secondary to reduced hyolaryngeal excursion;secondary to reduced laryngeal elevation;secondary to reduced posterior pharyngeal wall stripping;other (see comments)  increased residue w/ thin liquids  - --      Response to Residue -- partial residue clearance;with cued swallow  - --      Attempted Compensatory Maneuvers -- bolus size;bolus presentation style;additional subsequent swallow;multiple swallows  - --      Response to Attempted Compensatory Maneuvers -- did not prevent penetration;did not prevent aspiration;did not reduce residue  - --      Successful Compensatory Maneuver Competency -- patient able to;demonstrate compensations;with cues  - --      FEES Summary -- Severe pharyngeal dysphagia. Prespill w/ thins to the pyriforms. Deep penetration w/ thins before &  during the swallow w/ subsequent silent aspiration after the swallow. Cued cough weak and ineffective in clearing. Penetration & evntual silent aspiration of HTL & pudding after the swallow. Suspect fatigue impacting, as pt did not initially aspirate pudding. Diffuse residue w/ all consistenices assessed; increased residue w/ thins. Compensations ineffective in preventing penetration or aspiration. Safest recommendation cont NPO w/ NG, ok for 3-4 ice chips/hr after oral care w/ supervision per RN discretion  - --         SLP Evaluation Clinical Impression    SLP Swallowing Diagnosis -- severe;pharyngeal dysphagia  - suspected pharyngeal dysphagia  -      Functional Impact -- risk of aspiration/pneumonia  - risk of aspiration/pneumonia  -      Rehab Potential/Prognosis, Swallowing -- good, to achieve stated therapy goals  - good, to achieve stated therapy goals  -      Swallow Criteria for Skilled Therapeutic Interventions Met -- demonstrates skilled criteria  - demonstrates skilled criteria  -         SLP Treatment Clinical Impressions    Treatment Assessment (SLP) suspected;continued;pharyngeal dysphagia  - --  --      Treatment Assessment Comments (SLP) Pt noted w/ hoarse vocal quality & decreased intensity. Reviewed/completed all exercises. Provided handout and encouraged independent practice. Pt w/ intermittent cough & wet vocal quality w/ thin liquid trials. Rec cont NPO w/ NG, ok for 3-4 ice chips/hr after oral care as tolerated. SLP will f/u & monitor for appropriateness for repeat FEES  - -- --      Daily Summary of Progress (SLP) progress toward functional goals as expected  - -- --      Plan for Continued Treatment (SLP) continue treatment per plan of care  - -- --      Care Plan Review care plan/treatment goals reviewed  - -- --      Care Plan Review, Other Participant(s) family  - -- --         Recommendations    Therapy Frequency (Swallow) 5 days per week  - 5 days per week  - --      Predicted Duration Therapy Intervention (Days) 1 week  - 1 week  - 1 week  -      SLP Diet Recommendation NPO;temporary alternate methods of nutrition/hydration;other (see comments)  Ok for 3-4 ice chips/hr after oral care as tolerated  - NPO;temporary alternate methods of nutrition/hydration;other (see comments)  Ok for 3-4 ice chips/hr after oral care per RN discretion  - NPO;temporary alternate methods of nutrition/hydration;other (see comments)  Ok for 3-4 ice chips/hr after oral care as tolerated  -      Recommended Diagnostics -- -- reassess via FEES  -      Recommended Precautions and Strategies general aspiration precautions  - general aspiration precautions  - general aspiration precautions  -      Oral Care Recommendations Oral Care BID/PRN;Suction toothbrush  - Oral Care BID/PRN;Suction toothbrush  - Oral Care BID/PRN;Suction toothbrush  -      SLP Rec. for Method of Medication Administration meds via alternate route  - meds via alternate route  - meds via alternate route  -      Monitor for Signs of Aspiration yes;notify SLP if any concerns  - yes;notify SLP if any  concerns  - yes;notify SLP if any concerns  -      Anticipated Discharge Disposition (SLP) inpatient rehabilitation facility  - inpatient rehabilitation facility  - inpatient rehabilitation facility  -                User Key  (r) = Recorded By, (t) = Taken By, (c) = Cosigned By      Initials Name Effective Dates    CATY Kassy Whitehead, MS [facility]-SLP 05/12/23 -                     EDUCATION  The patient has been educated in the following areas:   Dysphagia (Swallowing Impairment) Oral Care/Hydration NPO rationale.        SLP GOALS       Row Name 01/22/25 1625 01/20/25 1320          (LTG) Patient will demonstrate progress toward functional swallow for    Diet Texture (Demonstrate progress toward functional swallow) regular textures  - regular textures  -     Liquid viscosity (Demonstrate progress toward functional swallow) thin liquids  - thin liquids  -     [place] (Demonstrate progress towards functional swallow) with minimal cues (75-90% accuracy)  - with minimal cues (75-90% accuracy)  -     Time Frame (Demonstrate progress toward functional swallow) 1 week  - 1 week  -     Progress/Outcomes (Demonstrate progress toward functional swallow) continuing progress toward goal  - new goal  -        (STG) Patient will tolerate therapeutic trials of    Consistencies Trialed (Tolerate therapeutic trials) pureed textures;thin liquids  - pureed textures;thin liquids  -     Desired Outcome (Tolerate therapeutic trials) without signs/symptoms of aspiration  - without signs/symptoms of aspiration  -     [place] (Tolerate therapeutic trials) with minimal cues (75-90% accuracy)  - with minimal cues (75-90% accuracy)  -     Time Frame (Tolerate therapeutic trials) 1 week  - 1 week  -     Progress/Outcomes (Tolerate therapeutic trials) continuing progress toward goal  - new [facility]  -     Comment (Tolerate therapeutic trials) Intermittent cough & wet vocal quality w/ thins   - --        (STG) Lingual Strengthening Goal 1 (SLP)    Activity (Lingual Strengthening Goal 1, SLP) increase lingual tone/sensation/control/coordination/movement;increase tongue back strength  - increase lingual tone/sensation/control/coordination/movement;increase tongue back strength  -     Increase Lingual Tone/Sensation/Control/Coordination/Movement lingual resistance exercises;swallow trials  - lingual resistance exercises;swallow trials  -     Increase Tongue Back Strength lingual resistance exercises  - lingual resistance exercises  -     [place]/Accuracy (Lingual Strengthening Goal 1, SLP) with minimal cues (75-90% accuracy)  - with minimal cues (75-90% accuracy)  -     Time Frame (Lingual Strengthening Goal 1, SLP) 1 week  - 1 week  -     Progress/Outcomes (Lingual Strengthening Goal 1, SLP) continuing progress toward goal  - new goal  -        ([facility]) Pharyngeal Strengthening Exercise Goal 1 (SLP)    Activity (Pharyngeal Strengthening Goal 1, SLP) increase timing;increase superior movement of the hyolaryngeal complex;increase anterior movement of the hyolaryngeal complex;increase closure at entrance to airway/closure of airway at glottis;increase squeeze/positive pressure generation  - increase timing;increase superior movement of the hyolaryngeal complex;increase anterior movement of the hyolaryngeal complex;increase closure at entrance to airway/closure of airway at glottis;increase squeeze/positive pressure generation  -     Increase Timing prepping - 3 second prep or suck swallow or 3-step swallow  - prepping - 3 second prep or suck swallow or 3-step swallow  -     Increase Superior Movement of the Hyolaryngeal Complex effortful pitch glide (falsetto + pharyngeal squeeze)  - effortful pitch glide (falsetto + pharyngeal squeeze)  -     Increase Anterior Movement of the Hyolaryngeal Complex chin tuck against resistance (CTAR)  - chin tuck against resistance  (CTAR)  -     Increase Closure at Entrance to Airway/Closure of Airway at Glottis supraglottic swallow  - supraglottic swallow  -     Increase Squeeze/Positive Pressure Generation hard effortful swallow  - hard effortful swallow  -     [place]/Accuracy (Pharyngeal Strengthening Goal 1, SLP) with minimal cues (75-90% accuracy)  -MH with minimal cues (75-90% accuracy)  -     Time Frame (Pharyngeal Strengthening Goal 1, SLP) 1 week  - 1 week  -MH     Progress/Outcomes (Pharyngeal Strengthening Goal 1, SLP) continuing progress toward goal  - new goal  -     Comment (Pharyngeal Strengthening Goal 1, SLP) Reviewed/completed all exercises. Provided handout and encouraged independent practice  - --               User Key  (r) = Recorded By, (t) = Taken By, (c) = Cosigned By      Initials Name Provider Type    Kassy Moe MS CCC-SLP Speech and Language Pathologist                         Time Calculation:    Time Calculation- SLP       Row Name 01/22/25 1724             Time Calculation- SLP    SLP Start Time 1625  -      SLP Received On 01/22/25  -         Untimed Charges    36079-CV Treatment Swallow Minutes 41  -MH         Total Minutes    Untimed Charges Total Minutes 41  -MH       Total Minutes 41  -MH                User Key  (r) = Recorded By, (t) = Taken By, (c) = Cosigned By      Initials Name Provider Type    Kassy Moe MS CCC-SLP Speech and Language Pathologist                    Therapy Charges for Today       Code Description Service Date Service Provider Modifiers Qty    [id number]  ST TREATMENT SWALLOW 3 1/22/2025 Kassy Whitehead MS CCC-SLP GN 1                 MS ZENA Alonzo  1/22/2025

## 2025-01-22 NOTE — PLAN OF CARE
Goal Outcome Evaluation:                   Anticipated Discharge Disposition (SLP): inpatient rehabilitation facility

## 2025-01-22 NOTE — THERAPY TREATMENT NOTE
Patient Name: Natalia Arzate  : 1985    MRN: [id number]                              Today's Date: 2025       Admit Date: 2025    Visit Dx:     ICD-10-CM ICD-9-CM   1. Respiratory acidosis with metabolic acidosis  E87.4 276.3   2. Acute respiratory failure with hypoxia  J96.01 518.81   3. MARIAA (acute kidney injury)  N17.9 584.9   4. Pharyngeal dysphagia  R13.13 787.23     Patient Active Problem List   Diagnosis    Nephrolithiasis    Ovarian teratoma, right    Other chronic pain    Pelvic pain    History of DVT in adulthood    History of pulmonary embolism    Ureteral calculus    Ischemic cerebrovascular accident (CVA)    Primary hypertension    Left lumbar radiculopathy    PTSD (post-traumatic stress disorder)    MARIAA (acute kidney injury)    Anemia    Dyslipidemia    Hypothyroid    Other secondary gout, multiple sites    Factor 5 Leiden mutation, heterozygous    Vitamin D deficiency    Vitamin B 12 deficiency    Type 2 diabetes mellitus with hyperglycemia    Hoarseness, persistent    Ureterolithiasis    Acute cystitis without hematuria    Hepatic steatosis    Right flank pain, chronic    Detrusor instability of bladder    Frequency of micturition    Acute respiratory failure with hypercapnia    Hyperkalemia    Sepsis    Respiratory acidosis with metabolic acidosis    Acute respiratory failure    Diabetes mellitus type 2, insulin dependent    Diabetic peripheral neuropathy associated with type 2 diabetes mellitus     Past Medical History:   Diagnosis Date    A-fib     Abnormal ECG     Anemia     Anxiety     Asthma     Cancer     Ovarian    Depression     Diabetes mellitus     DVT (deep venous thrombosis)     Factor 5 Leiden mutation, heterozygous     Fibroid     GERD (gastroesophageal reflux disease)     Gout     H/O abdominal abscess     History of sepsis     History of transfusion     Hyperlipidemia     Hypothyroid     Kidney stone     Migraines     Neuropathy     Ovarian cancer 2021     Ovarian cyst     PE (pulmonary embolism)     Polycystic ovary syndrome     Preeclampsia     Rh incompatibility     Stroke     TIA (transient ischemic attack)     Urinary tract infection     Varicella      Past Surgical History:   Procedure Laterality Date    ABDOMINAL SURGERY      CARDIAC CATHETERIZATION       SECTION      CHOLECYSTECTOMY      COLONOSCOPY      ENDOSCOPY      EXTRACORPOREAL SHOCK WAVE LITHOTRIPSY (ESWL) Left 10/22/2021    Procedure: EXTRACORPOREAL SHOCKWAVE LITHOTRIPSY;  Surgeon: Milan Motley MD;  Location: [facility];  Service: Urology;  Laterality: Left;    HYSTERECTOMY  2017    ovarian ca    INSERT CENTRAL LINE AT BEDSIDE  2025    LITHOTRIPSY      NEPHRECTOMY Left 10/2022    RIGHT OOPHORECTOMY      URETEROSCOPY LASER LITHOTRIPSY WITH STENT INSERTION Left 10/01/2021    Procedure: URETEROSCOPY WITH STENT PLACEMENT;  Surgeon: Milan Motley MD;  Location: [facility] OR;  Service: Urology;  Laterality: Left;    URETEROSCOPY LASER LITHOTRIPSY WITH STENT INSERTION Left 2022    Procedure: URETEROSCOPY STENT REMOVAL;  Surgeon: Milan Motley MD;  Location: [facility] OR;  Service: Urology;  Laterality: Left;    URETEROSCOPY LASER LITHOTRIPSY WITH STENT INSERTION Left 2022    Procedure: CYSTOSCOPY RETROGRADE LEFT WITH STENT PLACEMENT;  Surgeon: Milan Motley MD;  Location: [facility] OR;  Service: Urology;  Laterality: Left;      General Information       Row Name 25 1440          Physical Therapy Time and Intention    Document Type therapy note (daily note)  -NS     Mode of Treatment physical therapy  -NS       Row Name 25 1440          General Information    Patient Profile Reviewed yes  -NS     Existing Precautions/Restrictions fall;oxygen therapy device and L/min;other (see comments)  NG; back wound; hx of CVA with L sided deficits  -NS       Row Name 25 1440          Cognition    Orientation Status (Cognition) oriented x 3  -NS        Row Name 01/22/25 1440          Safety Issues/Impairments Affecting Functional Mobility    Safety Issues Affecting Function (Mobility) insight into deficits/self-awareness;judgment;problem-solving;safety precaution awareness;safety precautions follow-through/compliance;sequencing abilities  -NS     Impairments Affecting Function (Mobility) balance;coordination;endurance/activity tolerance;grasp;motor control;motor planning;strength;muscle tone abnormal;shortness of breath;sensation/sensory awareness;range of motion (ROM);postural/trunk control  -NS               User Key  (r) = Recorded By, (t) = Taken By, (c) = Cosigned By      Initials Name Provider Type    Germaine Westbrook PT Physical Therapist                   Mobility       Row Name 01/22/25 1440          Bed Mobility    Bed Mobility rolling left;rolling right  -NS     Rolling Left Lawrence (Bed Mobility) moderate assist (50% patient effort);2 person assist;verbal cues  -NS     Rolling Right Lawrence (Bed Mobility) maximum assist (25% patient effort);2 person assist;verbal cues  -NS     Assistive Device (Bed Mobility) bed rails;head of bed elevated;repositioning sheet  -NS     Comment, (Bed Mobility) cues for sequencing with pt able to assist with BLEs, extensive pericare required and pt feeling poorly following. RN present and managing.  -NS       Row Name 01/22/25 1440          Bed-Chair Transfer    Bed-Chair Lawrence (Transfers) dependent (less than 25% patient effort);2 person assist  -NS     Assistive Device (Bed-Chair Transfers) lift device  -NS               User Key  (r) = Recorded By, (t) = Taken By, (c) = Cosigned By      Initials Name Provider Type    Germaine Westbrook PT Physical Therapist                   Obj/Interventions       Row Name 01/22/25 1440          Motor Skills    Therapeutic Exercise hip;knee;ankle  -NS       Row Name 01/22/25 1440          Hip (Therapeutic Exercise)    Hip (Therapeutic Exercise) strengthening exercise   -NS     Hip Strengthening (Therapeutic Exercise) bilateral;external rotation;internal rotation;heel slides;10 repetitions  -NS       Row Name 01/22/25 1440          Knee (Therapeutic Exercise)    Knee (Therapeutic Exercise) isometric exercises  -NS     Knee Isometrics (Therapeutic Exercise) bilateral;quad sets;10 repetitions  -NS       Row Name 01/22/25 1440          Ankle (Therapeutic Exercise)    Ankle (Therapeutic Exercise) AROM (active range of motion)  -NS     Ankle AROM (Therapeutic Exercise) bilateral;dorsiflexion;10 repetitions  -NS       [facility] Name 01/22/25 1440          Balance    Comment, Balance Pt practiced cross-body reaching in yost's position to facilitate core activation to improve rolling ability and trunk control.  -NS               User Key  (r) = Recorded By, (t) = Taken By, (c) = Cosigned By      Initials Name Provider Type    Germaine Westbrook PT Physical Therapist                   Goals/Plan    No documentation.                  Clinical Impression       [facility] Name 01/22/25 1440          Pain    Pretreatment Pain Rating 0/10 - no pain  -NS     Posttreatment Pain Rating 0/10 - no pain  -NS       Row Name 01/22/25 1440          Plan of Care Review    Plan of Care Reviewed With patient  -NS     Progress no change  -NS     Outcome Evaluation Patient continues to be limited by weakness and poor activity tolerance. Good effort given towards LE strengthening and bed mobility. Will continue to progress as tolerated.  -NS       [facility] Name 01/22/25 1440          Vital Signs    Pretreatment Heart Rate (beats/min) 94  -NS     Posttreatment Heart Rate (beats/min) 93  -NS     Pre SpO2 (%) 95  -NS     O2 Delivery Pre Treatment nasal cannula  -NS     Post SpO2 (%) 91  -NS     O2 Delivery Post Treatment nasal cannula  -NS     Pre Patient Position Sitting  -NS     Intra Patient Position Side Lying  -NS     Post Patient Position Sitting  -NS       [facility] Name 01/22/25 1440          Positioning and Restraints     Pre-Treatment Position sitting in chair/recliner  -NS     Post Treatment Position bed  -NS     In Bed notified nsg;fowlers;call light within reach;encouraged to call for assist;with nsg;side rails up x2  -NS               User Key  (r) = Recorded By, (t) = Taken By, (c) = Cosigned By      Initials Name Provider Type    Germaine Westbrook, PT Physical Therapist                   Outcome Measures       Row Name 01/22/25 1440 01/22/25 0800       How much help from another person do you currently need...    Turning from your back to your side while in flat bed without using bedrails? 2  -NS 2  -AH    Moving from lying on back to sitting on the side of a flat bed without bedrails? 2  -NS 2  -AH    Moving to and from a bed to a chair (including a wheelchair)? 1  -NS 1  -AH    Standing up from a chair using your arms (e.g., wheelchair, bedside chair)? 1  -NS 1  -AH    Climbing 3-5 steps with a railing? 1  -NS 1  -AH    To walk in hospital room? 1  -NS 1  -AH    AM-PAC 6 Clicks Score (PT) 8  -NS 8  -AH    Highest Level of Mobility Goal 3 --> Sit at edge of bed  -NS 3 --> Sit at edge of bed  -AH      Row Name 01/22/25 1440          Functional Assessment    Outcome Measure Options AM-PAC 6 Clicks Basic Mobility (PT)  -NS               User Key  (r) = Recorded By, (t) = Taken By, (c) = Cosigned By      Initials Name Provider Type    Germaine Westbrook, PT Physical Therapist    Viola Espinoza RN Registered Nurse                                 Physical Therapy Education       Title: PT OT SLP Therapies (In Progress)       Topic: Physical Therapy (In Progress)       Point: Mobility training (In Progress)       Learning Progress Summary            Patient Acceptance, E, VU,NR by NS at 1/22/2025 1613    Comment: pt encouraged to continue with HEP between therapy sessions    Acceptance, E, NR by ML at 1/19/2025 1124   Family Acceptance, E, NR by ML at 1/19/2025 1124                      Point: Home exercise program (Done)        Learning Progress Summary            Patient Acceptance, E, VU,NR by NS at 1/22/2025 1613    Comment: pt encouraged to continue with HEP between therapy sessions                      Point: Body mechanics (Done)       Learning Progress Summary            Patient Acceptance, E, VU,NR by NS at 1/22/2025 1613    Comment: pt encouraged to continue with HEP between therapy sessions                      Point: Precautions (In Progress)       Learning Progress Summary            Patient Acceptance, E, VU,NR by NS at 1/22/2025 1613    Comment: pt encouraged to continue with HEP between therapy sessions    Acceptance, E, NR by ML at 1/19/2025 1124   Family Acceptance, E, NR by ML at 1/19/2025 1124                                      User Key       Initials Effective Dates Name Provider Type Discipline    NS 06/16/21 -  Germaine Thao PT Physical Therapist PT    ML 04/22/21 -  Debbi Hall Physical Therapist PT                  PT Recommendation and Plan     Progress: no change  Outcome Evaluation: Patient continues to be limited by weakness and poor activity tolerance. Good effort given towards LE strengthening and bed mobility. Will continue to progress as tolerated.     Time Calculation:         PT Charges       Row Name 01/22/25 1440             Time Calculation    Start Time 1440  -NS      PT Received On 01/22/25  -NS      PT Goal Re-Cert Due Date 01/29/25  -NS         Timed Charges    82539 - PT Therapeutic Exercise Minutes 20  -NS      26839 - PT Therapeutic Activity Minutes 38  -NS         Total Minutes    Timed Charges Total Minutes 58  -NS       Total Minutes 58  -NS                User Key  (r) = Recorded By, (t) = Taken By, (c) = Cosigned By      Initials Name Provider Type    NS Germaine Thao PT Physical Therapist                  Therapy Charges for Today       Code Description Service Date Service Provider Modifiers Qty    64227480751  PT THERAPEUTIC ACT EA 15 MIN 1/22/2025 Germaine Thao, PT GP 3     [id number]  PT THER PROC EA 15 MIN 1/22/2025 Germaine Thao, PT GP 1            PT G-Codes  Outcome Measure Options: AM-PAC 6 Clicks Basic Mobility (PT)  AM-PAC 6 Clicks Score (PT): 8  AM-PAC 6 Clicks Score (OT): 9  PT Discharge Summary  Anticipated Discharge Disposition (PT): inpatient rehabilitation facility    Germaine Thao, PT  1/22/2025

## 2025-01-22 NOTE — CASE MANAGEMENT/SOCIAL WORK
Continued Stay Note  CRISTINA Hillman     Patient Name: Natalia Arzate  MRN: [id number]  Today's Date: 1/22/2025    Admit Date: 1/2/2025    Plan: Rehab   Discharge Plan       Row Name 01/22/25 1200       Plan    Plan Rehab    Plan Comments Per discussion in MDR, Pt is c/o nausea and vertigo. Meclizine was added. She continues to have loose stools. A probiotic was added yesterday. Pt is tolerating tube feed and rate was advanced to 40ml/hr. T Max 99.4 Ax in past 24 hours. She is receiving IV Abx and is on 2L NC (does not wear home O2). I spoke with Pt, in room, who was drowsy. [facility] is following. CM will continue to follow for medical readiness.    Final Discharge Disposition Code 03 - skilled nursing facility (SNF)                   Discharge Codes    No documentation.                 Expected Discharge Date and Time       Expected Discharge Date Expected Discharge Time    Jan 27, 2025               Pallavi Donohue RN

## 2025-01-22 NOTE — PROGRESS NOTES
Clinical Nutrition     Patient Name: Natalia Arzate  YOB: 1985  MRN: [id number]  Date of Encounter: 25 14:44 EST  Admission date: 2025  Reason for Visit: Follow-up protocol, EN    Assessment   Nutrition Assessment   Admission Diagnosis:  Acute respiratory failure [J96.00]    Problem List:    Acute respiratory failure with hypercapnia    History of DVT in adulthood    Primary hypertension    PTSD (post-traumatic stress disorder)    MARIAA (acute kidney injury)    Hypothyroid    Factor 5 Leiden mutation, heterozygous    Type 2 diabetes mellitus with hyperglycemia    Sepsis      PMH:   She  has a past medical history of A-fib, Abnormal ECG, Anemia, Anxiety, Asthma, Cancer, Depression, Diabetes mellitus, DVT (deep venous thrombosis), Factor 5 Leiden mutation, heterozygous, Fibroid, GERD (gastroesophageal reflux disease), Gout, H/O abdominal abscess, History of sepsis, History of transfusion, Hyperlipidemia, Hypothyroid, Kidney stone, Migraines, Neuropathy, Ovarian cancer (2021), Ovarian cyst, PE (pulmonary embolism), Polycystic ovary syndrome, Preeclampsia, Rh incompatibility, Stroke, TIA (transient ischemic attack), Urinary tract infection, and Varicella.    PSH:  She  has a past surgical history that includes  section; Cholecystectomy; Colonoscopy; Cardiac catheterization; Right oophorectomy; Abdominal surgery; Esophagogastroduodenoscopy; ureteroscopy laser lithotripsy with stent insertion (Left, 10/01/2021); Extracorporeal shock wave lithotripsy (Left, 10/22/2021); Lithotripsy; ureteroscopy laser lithotripsy with stent insertion (Left, 2022); ureteroscopy laser lithotripsy with stent insertion (Left, 2022); Nephrectomy (Left, 10/2022); Hysterectomy (2017); and Insert Central Line At Bedside (2025).    Substance history: Opioids    Applicable Nutrition History:     ARF/VENT  25  s/p Bronch 25  MARIAA- CRRT 24---stopped 1-10-25  HD done  once 1-11-25  s/p Bronch 1-14-25  Extubated 1-17-25    SKIN: L flank -wound, sacrum/coccyx- areas of friction damage, area purple slow to becki    Labs    Labs Reviewed: Yes    Results from last 7 days   Lab Units 01/22/25  1201 01/21/25  1540 01/20/25  0658 01/19/25  0431 01/18/25  0431 01/17/25  1325 01/17/25  0526   GLUCOSE mg/dL 215* 207* 194*   < > 150* 138* 177*   BUN mg/dL 13 12 14   < > 25* 27* 29*   CREATININE mg/dL 0.54* 0.58 0.61   < > 0.66 0.71 0.71   SODIUM mmol/L 131* 133* 131*   < > 138 135* 137   CHLORIDE mmol/L 90* 90* 89*   < > 97* 93* 98   POTASSIUM mmol/L 4.0 3.9 3.7   < > 3.8  3.8 3.5 3.7   PHOSPHORUS mg/dL 4.2 4.2  --   --  4.3 3.8 3.6   MAGNESIUM mg/dL 1.0* 1.8 1.3*   < > 1.7 2.0 1.4*   ALT (SGPT) U/L  --   --   --   --  19 16 16    < > = values in this interval not displayed.       Results from last 7 days   Lab Units 01/18/25  0431 01/17/25  1325 01/17/25  0526   ALBUMIN g/dL 3.4* 3.6 3.3*   CRP mg/dL  --  4.07*  --    CHOLESTEROL mg/dL  --  147  --    TRIGLYCERIDES mg/dL  --  345*  --        Results from last 7 days   Lab Units 01/22/25  1148 01/22/25  0621 01/22/25  0035 01/21/25  1707 01/21/25  1221 01/21/25  0530   GLUCOSE mg/dL 225* 233* 199* 210* 221* 221*     Lab Results   Lab Value Date/Time    HGBA1C 9.10 (H) 01/01/2025 2133    HGBA1C 9.6 (H) 12/16/2024 1154    HGBA1C 9.9 (H) 10/15/2024 0505                   Medications    Medications Reviewed: yes    Scheduled Meds:amitriptyline, 25 mg, Oral, Nightly  amLODIPine, 10 mg, Oral, Daily  bumetanide, 1 mg, Intravenous, Daily  castor oil-balsam peru, 1 Application, Topical, Q12H  DULoxetine, 30 mg, Oral, Nightly  DULoxetine, 60 mg, Oral, Daily  enoxaparin, 120 mg, Subcutaneous, Q12H  gabapentin, 300 mg, Nasogastric, Q8H  insulin glargine, 30 Units, Subcutaneous, Q12H  insulin regular, 10 Units, Subcutaneous, Q6H  insulin regular, 4-24 Units, Subcutaneous, Q6H  lactobacillus acidophilus, 1 capsule, Oral, Daily  meropenem, 1,000 mg,  "Intravenous, Q8H  metoclopramide, 10 mg, Intravenous, Q6H  metoprolol succinate XL, 50 mg, Oral, BID  nystatin, , Topical, Q12H      Continuous Infusions:     PRN Meds:.  acetaminophen    dextrose    glucagon (human recombinant)    HYDROcodone-acetaminophen    ipratropium    ipratropium-albuterol    Magnesium Standard Dose Replacement - Follow Nurse / BPA Driven Protocol    meclizine    midazolam    ondansetron    oxyCODONE    Polyvinyl Alcohol-Povidone PF    Potassium Replacement - Follow Nurse / BPA Driven Protocol    sodium chloride    sodium chloride    Intake/Ouptut 24 hrs (0701 - 0700)   I&O's Reviewed: yes    Intake & Output (last day)         01/21 0701 01/22 0700 01/22 0701 01/23 0700    Other 371     NG/     IV Piggyback 200     Total Intake(mL/kg) 1080 (6.6)     Urine (mL/kg/hr) 700 (0.2) 750 (0.6)    Stool 0     Total Output 700 750    Net +380 -750          Urine Unmeasured Occurrence 2 x     Stool Unmeasured Occurrence 2 x            Anthropometrics     Height: Height: 162.6 cm (64.02\")64in  Last Filed Weight: Weight: (!) 164 kg (360 lb 11.2 oz) (01/22/25 0445)350lb  Method: Weight Method: Bed scaleest  BMI: BMI (Calculated): 61.960    UBW: last MD office weight 336lb    Weight change:  ? 27lb wt gain since January     Weight       Weight (kg) Weight (lbs) Weight Method Visit Report   5/24/2023 129.729 kg  286 lb  Stated  --     127.007 kg  280 lb   --    6/24/2023 127.007 kg  280 lb  Stated     7/6/2023 --  --   --    7/26/2023 129.275 kg  285 lb      7/27/2023 132.904 kg  293 lb   --    8/15/2023 124.739 kg  275 lb  Stated     8/17/2023 127.007 kg  280 lb  Stated     9/10/2023 129.275 kg  285 lb  Stated     10/6/2023 140.161 kg (H)  309 lb (H)   --    11/7/2023 133.811 kg  295 lb  Stated     11/20/2023 133.811 kg  295 lb      11/24/2023 133.358 kg  294 lb      11/26/2023 133.811 kg  295 lb  Stated     1/2/2024 140.161 kg (H)  309 lb (H)  Stated     2/13/2024 145.605 kg (H)  321 lb (H)  Stated "     3/12/2024 --  --   --    4/16/2024 142.611 kg (H)  314 lb 6.4 oz (H)   --    4/18/2024 142.429 kg (H)  314 lb (H)  Stated     4/26/2024 138.801 kg (H)  306 lb (H)  Stated     5/1/2024 138.801 kg (H)  306 lb (H)   --    5/6/2024 146.512 kg (H)  323 lb (H)   --    5/13/2024 146.512 kg (H)  323 lb (H)  Stated     7/8/2024 142.883 kg (H)  315 lb (H)  Stated     7/31/2024 147.691 kg (H)  325 lb 9.6 oz (H)   --    9/22/2024 143.79 kg (H)  317 lb (H)  Stated     12/3/2024 153.588 kg (H)  338 lb 9.6 oz (H)   --    12/16/2024 153.316 kg (H)  338 lb (H)   --     152.409 kg (H)  336 lb (H)   --    1/1/2025 158.759 kg (H)  350 lb (H)  Estimated        Legend:  (H) High  Nutrition Focused Physical Exam     Date:     Unable to perform due to Pt unable to participate at time of visit     Subjective   Reported/Observed/Food/Nutrition Related History:     1/22  Pt up in chair, notes some nausea. Spoke w/RN, states pt started on reglan. RN does not suspect pt nausea is TF related as pt also reporting to RN that she has vertigo. RN notes keofeed clogged earlier, suspects r/t medication vs water flush.     1/21  Pt resting in bed, states she is tolerating current TF regimen. Pt had FEES, recommending NPO. RN states pt started on reglan today and compazine dc'd.     1-20: per RN: pt had nausea vomiting over weekend, TF decreased to trophic rate pt tolerating, plan for FEES today  Keofeed no longer in small bowel has migrated back into stomach per last KUB  Pt resting in bed, on nasal cannula, is alert, oriented, states she has vertigo, and get sick when she is rolled in the bed    1-16: pt intubated, sedated   + fentanyl, precedex, heparin, insulin  Per RN: pt tolerating TF, had small bm last night    1-14-25: pt intubated, sedated, fiancee at bedside  + fentanyl, precedex, insulin  Per RN: TF not restarted yesterday, unclear why, plan for bronch today  Plan to resume TF s/p Bronch    1-10: pt intubated sedated, remains on CRRT  +  "fentanyl, heparin  Per RN: pt has been off pressors since last night, is less puffy, able to tolerate turning, has not had a bm, had good bowel sound  Per MD: ok to resume TF    1-8: pt intubated, sedated + fentanyl, versed,levophed 0.06mcg, bicarb @75ml/hr  Per RN: pt had 800ml GRV last night TF on hold, is super acidotic, line placed for dialysis, plan to start CRRT     1-6: pt intubated, sedated, fiancee at bedside  + fentanyl, versed, insulin  Per RN:pt s/p emergent bronch Saturday,  trophic feed started yesterday  Per MD ok to advance TF    1-3: Pt intubated, sedated, fiancee at bedside  + insulin, fentanyl, versed, heparin, amio  Per RN: pt had wide complex rhythm last night, lokelma on hold as K+ wnl, 103 temp  Per MD ok to start TF,  place keofeed    Needs Assessment   Date:25    Height used:Height: 162.6 cm (64.02\")64in  Weights used/ ABW: 336lb/ 153kg   IBW: 120lb/ 55kg    Estimated Calorie needs: ~1500kcal  Method:  22-25Kcals/KG IBW: 1210-1375kcal  Method:  MSJ ABW: 2195kcal    Estimated Protein needs: ~ 138g protein  Method: 2.5g protein per kg IBW: 138g protein  Method: 0.8-1g protein per kg ABW: 122-153g protein    Current Nutrition Prescription     PO: NPO Diet NPO Type: Strict NPO  Oral Nutrition Supplement:  Intake: N/A    EN: Peptamen Intense VHP  Goal Rate: 75ml  Water Flushes: 25ml  Modular: None  Route: NG   (keofeed now in stomach per KUB 25)  Tube: Small bore    At goal over: 22Hrs/day     Rx will supply:   Goal Volume 1650  mL/day     Flush Volume 550 mL/day     Energy 1640 Kcal/day 109 % Est Need   Protein 154 g/day 112 % Est Need   Fiber 7 g/day     Water in  EN 1386 mL     Total Water 1936 mL     Meet DRI Yes        --------------------------------------------------------------------------  Product/Rate verified at bedside: Yes  Infusing Rate at time of visit: 40ml    Average Delivery from Chart[facility] Day: advancing towards goal  Volume 320 mL/day 21  % Goal Vol.   Flush Volume " 425 mL/day     Energy  Kcal/day  % Est Need   Protein  g/day  % Est Need   Fiber  g/day     Water in  EN  mL     Total Water  mL     Meet DRI No           Assessment & Plan   Nutrition Diagnosis   Date: 1-3-25  Updated: 1-20-25  Problem Inadequate energy intake    Etiology ARF/Extubated   Signs/Symptoms 21% goal volume EN   Status: Resolved     Date:  1/22 Updated:  Problem Inadequate oral intake   Etiology Dysphagia s/p intubation   Signs/Symptoms NPO + keofeed   Status: New    Goal:   Nutrition to support treatment and Tolerate EN at goal      Nutrition Intervention      Follow treatment progress, Care plan reviewed    Recommend slowly advance EN back to goal:  Peptamen VHP @ 20ml/hr advance 10ml q 8hrs to goal @ 70ml/hr. Water flush @ 25ml/hr.   Infused x 22 hrs provides 1540ml, 1540kcals (103% est needs), 144g pro (104% est needs), 7g fiber, 1294ml water from formula, +550ml water from flushes, 1844ml TFW    If nausea continues, would recommend advance keofeed post-pyloric    Monitoring/Evaluation:   Per protocol, I&O, Pertinent labs, Weight, Skin status, GI status, Symptoms    Stephanie Henderson, MS,RD,LD  Time Spent: 25min

## 2025-01-23 ENCOUNTER — APPOINTMENT (OUTPATIENT)
Dept: GENERAL RADIOLOGY | Facility: HOSPITAL | Age: 40
DRG: 870 | End: 2025-01-23
Payer: COMMERCIAL

## 2025-01-23 ENCOUNTER — ANCILLARY PROCEDURE (OUTPATIENT)
Dept: SPEECH THERAPY | Facility: HOSPITAL | Age: 40
DRG: 870 | End: 2025-01-23
Payer: COMMERCIAL

## 2025-01-23 LAB
GLUCOSE BLDC GLUCOMTR-MCNC: 220 MG/DL (ref 70–130)
GLUCOSE BLDC GLUCOMTR-MCNC: 231 MG/DL (ref 70–130)
GLUCOSE BLDC GLUCOMTR-MCNC: 234 MG/DL (ref 70–130)
GLUCOSE BLDC GLUCOMTR-MCNC: 255 MG/DL (ref 70–130)
GLUCOSE BLDC GLUCOMTR-MCNC: 270 MG/DL (ref 70–130)
GLUCOSE BLDC GLUCOMTR-MCNC: 303 MG/DL (ref 70–130)
MAGNESIUM SERPL-MCNC: 2.3 MG/DL (ref 1.6–2.6)

## 2025-01-23 PROCEDURE — 99232 SBSQ HOSP IP/OBS MODERATE 35: CPT | Performed by: PEDIATRICS

## 2025-01-23 PROCEDURE — 83735 ASSAY OF MAGNESIUM: CPT | Performed by: PEDIATRICS

## 2025-01-23 PROCEDURE — 25010000002 BUMETANIDE PER 0.5 MG: Performed by: INTERNAL MEDICINE

## 2025-01-23 PROCEDURE — 25010000002 ENOXAPARIN PER 10 MG: Performed by: INTERNAL MEDICINE

## 2025-01-23 PROCEDURE — 25010000002 METOCLOPRAMIDE PER 10 MG: Performed by: PEDIATRICS

## 2025-01-23 PROCEDURE — 92526 ORAL FUNCTION THERAPY: CPT

## 2025-01-23 PROCEDURE — 63710000001 INSULIN REGULAR HUMAN PER 5 UNITS: Performed by: PEDIATRICS

## 2025-01-23 PROCEDURE — 73060 X-RAY EXAM OF HUMERUS: CPT

## 2025-01-23 PROCEDURE — 82948 REAGENT STRIP/BLOOD GLUCOSE: CPT

## 2025-01-23 PROCEDURE — 63710000001 INSULIN GLARGINE PER 5 UNITS: Performed by: PEDIATRICS

## 2025-01-23 PROCEDURE — 63710000001 INSULIN REGULAR HUMAN PER 5 UNITS: Performed by: INTERNAL MEDICINE

## 2025-01-23 PROCEDURE — 25010000002 MIDAZOLAM PER 1 MG: Performed by: INTERNAL MEDICINE

## 2025-01-23 PROCEDURE — 73090 X-RAY EXAM OF FOREARM: CPT

## 2025-01-23 PROCEDURE — 92612 ENDOSCOPY SWALLOW (FEES) VID: CPT

## 2025-01-23 RX ORDER — AMLODIPINE BESYLATE 10 MG/1
10 TABLET ORAL DAILY
Status: DISCONTINUED | OUTPATIENT
Start: 2025-01-24 | End: 2025-01-27

## 2025-01-23 RX ORDER — L.ACID,PARA/B.BIFIDUM/S.THERM 8B CELL
1 CAPSULE ORAL DAILY
Status: DISCONTINUED | OUTPATIENT
Start: 2025-01-24 | End: 2025-01-27

## 2025-01-23 RX ADMIN — METOPROLOL SUCCINATE 50 MG: 50 TABLET, EXTENDED RELEASE ORAL at 10:07

## 2025-01-23 RX ADMIN — INSULIN HUMAN 10 UNITS: 100 INJECTION, SOLUTION PARENTERAL at 06:54

## 2025-01-23 RX ADMIN — AMITRIPTYLINE HYDROCHLORIDE 25 MG: 25 TABLET, FILM COATED ORAL at 21:00

## 2025-01-23 RX ADMIN — DULOXETINE HYDROCHLORIDE 30 MG: 30 CAPSULE, DELAYED RELEASE ORAL at 21:00

## 2025-01-23 RX ADMIN — HYDROCODONE BITARTRATE AND ACETAMINOPHEN 1 TABLET: 5; 325 TABLET ORAL at 05:05

## 2025-01-23 RX ADMIN — Medication 1 CAPSULE: at 10:08

## 2025-01-23 RX ADMIN — Medication 1 APPLICATION: at 10:10

## 2025-01-23 RX ADMIN — INSULIN HUMAN 12 UNITS: 100 INJECTION, SOLUTION PARENTERAL at 13:13

## 2025-01-23 RX ADMIN — OXYCODONE HYDROCHLORIDE 5 MG: 5 SOLUTION ORAL at 16:22

## 2025-01-23 RX ADMIN — HYDROCODONE BITARTRATE AND ACETAMINOPHEN 1 TABLET: 5; 325 TABLET ORAL at 13:12

## 2025-01-23 RX ADMIN — OXYCODONE HYDROCHLORIDE 5 MG: 5 SOLUTION ORAL at 10:06

## 2025-01-23 RX ADMIN — METOCLOPRAMIDE HYDROCHLORIDE 10 MG: 10 INJECTION, SOLUTION INTRAMUSCULAR; INTRAVENOUS at 16:22

## 2025-01-23 RX ADMIN — GABAPENTIN 300 MG: 300 CAPSULE ORAL at 21:04

## 2025-01-23 RX ADMIN — METOCLOPRAMIDE HYDROCHLORIDE 10 MG: 10 INJECTION, SOLUTION INTRAMUSCULAR; INTRAVENOUS at 10:10

## 2025-01-23 RX ADMIN — MIDAZOLAM HYDROCHLORIDE 2 MG: 1 INJECTION, SOLUTION INTRAMUSCULAR; INTRAVENOUS at 20:59

## 2025-01-23 RX ADMIN — BUMETANIDE 1 MG: 0.25 INJECTION INTRAMUSCULAR; INTRAVENOUS at 10:07

## 2025-01-23 RX ADMIN — INSULIN HUMAN 12 UNITS: 100 INJECTION, SOLUTION PARENTERAL at 18:42

## 2025-01-23 RX ADMIN — NYSTATIN: 100000 POWDER TOPICAL at 10:08

## 2025-01-23 RX ADMIN — AMLODIPINE BESYLATE 10 MG: 10 TABLET ORAL at 10:07

## 2025-01-23 RX ADMIN — INSULIN HUMAN 8 UNITS: 100 INJECTION, SOLUTION PARENTERAL at 18:43

## 2025-01-23 RX ADMIN — INSULIN HUMAN 16 UNITS: 100 INJECTION, SOLUTION PARENTERAL at 06:54

## 2025-01-23 RX ADMIN — NYSTATIN: 100000 POWDER TOPICAL at 21:02

## 2025-01-23 RX ADMIN — GABAPENTIN 300 MG: 300 CAPSULE ORAL at 13:12

## 2025-01-23 RX ADMIN — ENOXAPARIN SODIUM 120 MG: 120 INJECTION SUBCUTANEOUS at 13:13

## 2025-01-23 RX ADMIN — INSULIN GLARGINE 35 UNITS: 100 INJECTION, SOLUTION SUBCUTANEOUS at 10:07

## 2025-01-23 RX ADMIN — INSULIN GLARGINE 35 UNITS: 100 INJECTION, SOLUTION SUBCUTANEOUS at 21:02

## 2025-01-23 RX ADMIN — DULOXETINE HYDROCHLORIDE 60 MG: 60 CAPSULE, DELAYED RELEASE ORAL at 10:07

## 2025-01-23 RX ADMIN — OXYCODONE HYDROCHLORIDE 5 MG: 5 SOLUTION ORAL at 01:39

## 2025-01-23 RX ADMIN — METOCLOPRAMIDE HYDROCHLORIDE 10 MG: 10 INJECTION, SOLUTION INTRAMUSCULAR; INTRAVENOUS at 05:00

## 2025-01-23 RX ADMIN — GABAPENTIN 300 MG: 300 CAPSULE ORAL at 05:00

## 2025-01-23 RX ADMIN — METOPROLOL SUCCINATE 50 MG: 50 TABLET, EXTENDED RELEASE ORAL at 21:00

## 2025-01-23 NOTE — CASE MANAGEMENT/SOCIAL WORK
Continued Stay Note  [facility]     Patient Name: Natalia Arzate  MRN: [id number]  Today's Date: 1/23/2025    Admit Date: 1/2/2025    Plan: Rehab   Discharge Plan       Row Name 01/23/25 1358       Plan    Plan Rehab    Plan Comments Pt is tolerating TF at goal rate. FEES is ordered. She has been hypertensive (SBP in 150s) and is on 2L NC. She is receiving IV diuretics. Xrays were ordered of R forearm and humerus due to pain, weakness, and edema; Results are pending. She was a 2 person assist to transfer from the bed to the chair yesterday with therapy. I spoke with Pt, in room, who states she feels somewhat better today. Jimmie with [facility] is following. CM will continue to follow for medical readiness.    Final Discharge Disposition Code 03 - skilled nursing facility (SNF)                   Discharge Codes    No documentation.                 Expected Discharge Date and Time       Expected Discharge Date Expected Discharge Time    Jan 27, 2025               Pallavi Donohue RN

## 2025-01-23 NOTE — PROGRESS NOTES
Clinical Nutrition     Patient Name: Natalia Arzate  YOB: 1985  MRN: [id number]  Date of Encounter: 25 12:46 EST  Admission date: 2025  Reason for Visit: Follow-up protocol, EN    Assessment   Nutrition Assessment   Admission Diagnosis:  Acute respiratory failure [J96.00]    Problem List:    Acute respiratory failure with hypercapnia    History of DVT in adulthood    Primary hypertension    PTSD (post-traumatic stress disorder)    MARIAA (acute kidney injury)    Hypothyroid    Factor 5 Leiden mutation, heterozygous    Type 2 diabetes mellitus with hyperglycemia    Sepsis      PMH:   She  has a past medical history of A-fib, Abnormal ECG, Anemia, Anxiety, Asthma, Cancer, Depression, Diabetes mellitus, DVT (deep venous thrombosis), Factor 5 Leiden mutation, heterozygous, Fibroid, GERD (gastroesophageal reflux disease), Gout, H/O abdominal abscess, History of sepsis, History of transfusion, Hyperlipidemia, Hypothyroid, Kidney stone, Migraines, Neuropathy, Ovarian cancer (2021), Ovarian cyst, PE (pulmonary embolism), Polycystic ovary syndrome, Preeclampsia, Rh incompatibility, Stroke, TIA (transient ischemic attack), Urinary tract infection, and Varicella.    PSH:  She  has a past surgical history that includes  section; Cholecystectomy; Colonoscopy; Cardiac catheterization; Right oophorectomy; Abdominal surgery; Esophagogastroduodenoscopy; ureteroscopy laser lithotripsy with stent insertion (Left, 10/01/2021); Extracorporeal shock wave lithotripsy (Left, 10/22/2021); Lithotripsy; ureteroscopy laser lithotripsy with stent insertion (Left, 2022); ureteroscopy laser lithotripsy with stent insertion (Left, 2022); Nephrectomy (Left, 10/2022); Hysterectomy (2017); and Insert Central Line At Bedside (2025).    Substance history: Opioids    Applicable Nutrition History:   (25) ARF/VENT   (25) CRRT 24---stopped 1-10-25  (25) HD done  once  (1/17/25) Extubated   (1/23) FEES: SLP Diet Recommendation: NPO, temporary alternate methods of nutrition/hydration, other (see comments) (x 4oz puree snack, 3x/day, as well as 6-8 ice chips or 1/4 tsp H2O/hour.     SKIN: L flank -wound, sacrum/coccyx- areas of friction damage, area purple slow to becki    Labs    Labs Reviewed: Yes    Results from last 7 days   Lab Units 01/23/25  0453 01/22/25  1201 01/21/25  1540 01/20/25  0658 01/19/25  0431 01/18/25  0431 01/17/25  1325 01/17/25  0526   GLUCOSE mg/dL  --  215* 207* 194*   < > 150* 138* 177*   BUN mg/dL  --  13 12 14   < > 25* 27* 29*   CREATININE mg/dL  --  0.54* 0.58 0.61   < > 0.66 0.71 0.71   SODIUM mmol/L  --  131* 133* 131*   < > 138 135* 137   CHLORIDE mmol/L  --  90* 90* 89*   < > 97* 93* 98   POTASSIUM mmol/L  --  4.0 3.9 3.7   < > 3.8  3.8 3.5 3.7   PHOSPHORUS mg/dL  --  4.2 4.2  --   --  4.3 3.8 3.6   MAGNESIUM mg/dL 2.3 1.0* 1.8 1.3*   < > 1.7 2.0 1.4*   ALT (SGPT) U/L  --   --   --   --   --  19 16 16    < > = values in this interval not displayed.       Results from last 7 days   Lab Units 01/18/25  0431 01/17/25  1325 01/17/25  0526   ALBUMIN g/dL 3.4* 3.6 3.3*   CRP mg/dL  --  4.07*  --    CHOLESTEROL mg/dL  --  147  --    TRIGLYCERIDES mg/dL  --  345*  --        Results from last 7 days   Lab Units 01/23/25  1118 01/23/25  0649 01/23/25  0435 01/23/25  0005 01/22/25  1728 01/22/25  1148   GLUCOSE mg/dL 255* 303* 270* 220* 210* 225*     Lab Results   Lab Value Date/Time    HGBA1C 9.10 (H) 01/01/2025 2133    HGBA1C 9.6 (H) 12/16/2024 1154    HGBA1C 9.9 (H) 10/15/2024 0505                   Medications    Medications Reviewed: yes    Scheduled Meds:amitriptyline, 25 mg, Nasogastric, Nightly  [START ON 1/24/2025] amLODIPine, 10 mg, Nasogastric, Daily  bumetanide, 1 mg, Intravenous, Daily  castor oil-balsam peru, 1 Application, Topical, Q12H  DULoxetine, 30 mg, Oral, Nightly  DULoxetine, 60 mg, Oral, Daily  enoxaparin, 120 mg, Subcutaneous,  "Q12H  gabapentin, 300 mg, Nasogastric, Q8H  insulin glargine, 35 Units, Subcutaneous, Q12H  insulin regular, 12 Units, Subcutaneous, Q6H  insulin regular, 4-24 Units, Subcutaneous, Q6H  [START ON 1/24/2025] lactobacillus acidophilus, 1 capsule, Nasogastric, Daily  metoclopramide, 10 mg, Intravenous, Q6H  metoprolol succinate XL, 50 mg, Oral, BID  nystatin, , Topical, Q12H      Continuous Infusions:     PRN Meds:.  acetaminophen    dextrose    glucagon (human recombinant)    HYDROcodone-acetaminophen    ipratropium    ipratropium-albuterol    Magnesium Standard Dose Replacement - Follow Nurse / BPA Driven Protocol    meclizine    midazolam    ondansetron    oxyCODONE    Polyvinyl Alcohol-Povidone PF    Potassium Replacement - Follow Nurse / BPA Driven Protocol    sodium chloride    sodium chloride    Intake/Ouptut 24 hrs (0701 - 0700)   I&O's Reviewed: yes    Intake & Output (last day)         01/22 0701 01/23 0700 01/23 0701 01/24 0700    Other 211     NG/     IV Piggyback      Total Intake(mL/kg) 603 (3.5)     Urine (mL/kg/hr) 750 (0.2) 550 (0.6)    Stool 0 0    Total Output 750 550    Net -147 -550          Urine Unmeasured Occurrence 6 x     Stool Unmeasured Occurrence 1 x 3 x           Anthropometrics     Height: Height: 162.6 cm (64.02\")64in  Last Filed Weight: Weight: (!) 171 kg (377 lb 12.8 oz) (01/23/25 0450)350lb  Method: Weight Method: Bed scaleest  BMI: BMI (Calculated): 64.860    UBW: last MD office weight 336lb    Weight change:  ? 27lb wt gain since January    Nutrition Focused Physical Exam     Date:     Unable to perform due to Pt unable to participate at time of visit     Subjective   Reported/Observed/Food/Nutrition Related History:   1/23  She is tolerating @ goal rate. Tube clogged again and has been on hold since 10am.      Patient reports she gets nausea when she drinks milk at home.  Food that has dairy gives her diarrhea. She is not sure if she had lactose intolerance. She noticed " change in her food tolerance after she had her gallbalder removed in 2017    RN reports patient now having frequent BM.  Could be exacerbated by Reglan     FEES completed with recs for NPO status.      1/22  Pt up in chair, notes some nausea. Spoke w/RN, states pt started on reglan. RN does not suspect pt nausea is TF related as pt also reporting to RN that she has vertigo. RN notes keofeed clogged earlier, suspects r/t medication vs water flush.     1/21  Pt resting in bed, states she is tolerating current TF regimen. Pt had FEES, recommending NPO. RN states pt started on reglan today and compazine dc'd.     1-20: per RN: pt had nausea vomiting over weekend, TF decreased to trophic rate pt tolerating, plan for FEES today  Keofeed no longer in small bowel has migrated back into stomach per last KUB  Pt resting in bed, on nasal cannula, is alert, oriented, states she has vertigo, and get sick when she is rolled in the bed    1-16: pt intubated, sedated   + fentanyl, precedex, heparin, insulin  Per RN: pt tolerating TF, had small bm last night    1-14-25: pt intubated, sedated, fiancee at bedside  + fentanyl, precedex, insulin  Per RN: TF not restarted yesterday, unclear why, plan for bronch today  Plan to resume TF s/p Bronch    1-10: pt intubated sedated, remains on CRRT  + fentanyl, heparin  Per RN: pt has been off pressors since last night, is less puffy, able to tolerate turning, has not had a bm, had good bowel sound  Per MD: ok to resume TF    1-8: pt intubated, sedated + fentanyl, versed,levophed 0.06mcg, bicarb @75ml/hr  Per RN: pt had 800ml GRV last night TF on hold, is super acidotic, line placed for dialysis, plan to start CRRT     1-6: pt intubated, sedated, fiancee at bedside  + fentanyl, versed, insulin  Per RN:pt s/p emergent bronch Saturday,  trophic feed started yesterday  Per MD ok to advance TF    1-3: Pt intubated, sedated, fiancee at bedside  + insulin, fentanyl, versed, heparin, amio  Per RN: pt  "had wide complex rhythm last night, [facility] on hold as K+ wnl, 103 temp  Per MD ok to start TF,  place keofeed    Needs Assessment   Date:25    Height used:Height: 162.6 cm (64.02\")64in  Weights used/ ABW: 336lb/ 153kg   IBW: 120lb/ 55kg    Estimated Calorie needs: ~1500kcal  Method:  22-25Kcals/KG IBW: 1210-1375kcal  Method:  MSJ ABW: 2195kcal    Estimated Protein needs: ~ 138g protein  Method: 2.5g protein per kg IBW: 138g protein  Method: 0.8-1g protein per kg ABW: 122-153g protein    Current Nutrition Prescription     PO: NPO Diet NPO Type: Strict NPO  Oral Nutrition Supplement:  Intake: N/A    EN: Peptamen Intense VHP  Goal Rate: 75ml  Water Flushes: 25ml  Modular: None  Route: NG   (keofeed now in stomach per KUB 25)  Tube: Small bore    At goal over: 22Hrs/day     Rx will supply:   Goal Volume 1650  mL/day     Flush Volume 550 mL/day     Energy 1640 Kcal/day 109 % Est Need   Protein 154 g/day 112 % Est Need   Fiber 7 g/day     Water in  EN 1386 mL     Total Water 1936 mL     Meet DRI Yes        --------------------------------------------------------------------------  Product/Rate verified at bedside: Yes  Infusing Rate at time of visit: on hold at time of visit     Average Delivery from [facility]:   Volume 518 mL/day   % Goal Vol.   Flush Volume  mL/day     Energy  Kcal/day  % Est Need   Protein  g/day  % Est Need   Fiber  g/day     Water in  EN  mL     Total Water  mL     Meet DRI No           Assessment & Plan   Nutrition Diagnosis   Date: 1-3-25  Updated: 25  Problem Inadequate energy intake    Etiology ARF/Extubated   Signs/Symptoms 21% goal volume EN   Status: Resolved     Date:   Updated:  Problem Inadequate oral intake   Etiology Dysphagia s/p intubation   Signs/Symptoms keofeed   Status: New    Goal:   Nutrition to support treatment and Tolerate EN at goal      Nutrition Intervention      Follow treatment progress, Care plan reviewed    Recommend continue with current EN " Rx.  Peptamen VHP @ 20ml/hr advance 10ml q 8hrs to goal @ 70ml/hr. Water flush @ 25ml/hr.   Infused x 22 hrs provides 1540ml, 1540kcals (103% est needs), 144g pro (104% est needs), 7g fiber, 1294ml water from formula, +550ml water from flushes, 1844ml TFW    If nausea continues, would recommend advance keofeed post-pylori    Monitoring/Evaluation:   Per protocol, I&O, Pertinent labs, Weight, Skin status, GI status, Symptoms    Maria Victoria Keen, LOUIE  Time Spent: 30min

## 2025-01-23 NOTE — PROGRESS NOTES
[facility] Medicine Services  PROGRESS NOTE    Patient Name: Natalia Arzate  : 1985  MRN: [id number]    Date of Admission: 2025  Primary Care Physician: Bladimir Calle PA-C    Subjective   Subjective     CC:  Resp failure    HPI:  Patient complaining of pain in her right arm today.  She says it started last night but denies any specific trauma to the area.  Nursing also cannot pinpoint any specific injury to the arm.  Nursing states there was some swelling of the right shoulder area this morning.      Objective   Objective     Vital Signs:   Temp:  [97.8 °F (36.6 °C)-99.2 °F (37.3 °C)] 99 °F (37.2 °C)  Heart Rate:  [] 94  Resp:  [16-20] 20  BP: (146-153)/(86-98) 150/98  Flow (L/min) (Oxygen Therapy):  [1-4] 1     Physical Exam:  Constitutional: No acute distress, awake, alert, laying in bed.  HENT: NCAT, mucous membranes moist, keofeed in place  Respiratory: Coarse upper airway noise bilaterally, respiratory effort normal  Cardiovascular: slightly tachycardic, no murmurs, rubs, or gallops  Gastrointestinal: Positive bowel sounds, soft, NT, nondistended  Musculoskeletal: 2+ bilateral ankle edema.  Pain with movement at the elbow of her right arm as well as pain to palpation of the forearm as well as the upper arm.  She has decreased strength secondary to pain to extension of the arm as well as  strength.  Psychiatric: Appropriate affect, cooperative  Neurologic: Oriented x 3,  Cranial Nerves grossly intact to confrontation, speech clear  Skin: No rashes      Results Reviewed:  LAB RESULTS:      Lab 25  1201 25  1540 25  0658 25  2045 25  1810 25  0431 25  0431 25  1325 25  0526   WBC 3.90 4.79 4.82  --   --  5.15 4.84  --  5.41   HEMOGLOBIN 9.8* 9.6* 8.9*  --   --  8.1* 8.2*  --  7.8*   HEMATOCRIT 31.2* 30.9* 28.9*  --   --  26.7* 26.5*  --  25.2*   PLATELETS 195 215 210  --   --  184 144  --  138*    NEUTROS ABS 2.43  --   --   --   --  3.34 3.36  --  3.99   IMMATURE GRANS (ABS) 0.04  --   --   --   --  0.07* 0.04  --  0.04   LYMPHS ABS 0.53*  --   --   --   --  1.03 0.82  --  0.75   MONOS ABS 0.65  --   --   --   --  0.54 0.42  --  0.43   EOS ABS 0.22  --   --   --   --  0.14 0.17  --  0.17   MCV 92.6 93.6 93.5  --   --  96.4 94.6  --  94.4   CRP  --   --   --   --   --   --   --  4.07*  --    HEPARIN ANTI-XA  --  0.81 0.52 0.65 0.10* 0.61 0.46  --  0.41         Lab 01/23/25  0453 01/22/25  1201 01/21/25  1540 01/20/25  0658 01/19/25  0431 01/18/25  0431 01/17/25  1325 01/17/25  0526   SODIUM  --  131* 133* 131* 134* 138 135* 137   POTASSIUM  --  4.0 3.9 3.7 3.9 3.8  3.8 3.5 3.7   CHLORIDE  --  90* 90* 89* 93* 97* 93* 98   CO2  --  29.0 30.0* 30.0* 30.0* 30.0* 31.0* 29.0   ANION GAP  --  12.0 13.0 12.0 11.0 11.0 11.0 10.0   BUN  --  13 12 14 21* 25* 27* 29*   CREATININE  --  0.54* 0.58 0.61 0.66 0.66 0.71 0.71   EGFR  --  121.0 119.0 117.5 115.3 115.3 111.8 111.8   GLUCOSE  --  215* 207* 194* 164* 150* 138* 177*   CALCIUM  --  9.9 9.8 9.5 9.6 9.7 9.9 9.7   MAGNESIUM 2.3 1.0* 1.8 1.3* 1.5* 1.7 2.0 1.4*   PHOSPHORUS  --  4.2 4.2  --   --  4.3 3.8 3.6         Lab 01/18/25  0431 01/17/25  1325 01/17/25  0526   TOTAL PROTEIN 7.3 7.8 7.2   ALBUMIN 3.4* 3.6 3.3*   GLOBULIN 3.9 4.2 3.9   ALT (SGPT) 19 16 16   AST (SGOT) 53* 44* 44*   BILIRUBIN 0.3 0.3 0.3   ALK PHOS 124* 136* 129*             Lab 01/17/25  1325   CHOLESTEROL 147   TRIGLYCERIDES 345*             Lab 01/17/25  1016   PH, ARTERIAL 7.423   PCO2, ARTERIAL 41.7   PO2 .0*   FIO2 45   HCO3 ART 27.2*   BASE EXCESS ART 2.5*   CARBOXYHEMOGLOBIN 1.4     Brief Urine Lab Results  (Last result in the past 365 days)        Color   Clarity   Blood   Leuk Est   Nitrite   Protein   CREAT   Urine HCG        01/02/25 1213             52.3         01/02/25 1213 Yellow   Cloudy   Moderate (2+)   Negative   Negative   100 mg/dL (2+)                   Microbiology  Results Abnormal       Procedure Component Value - Date/Time    Respiratory Culture - Wash, Lung, Left Lower Lobe [[id number]]  (Abnormal)  (Susceptibility) Collected: 01/14/25 1608    Lab Status: Final result Specimen: Wash from Lung, Left Lower Lobe Updated: 01/17/25 1221     Respiratory Culture Light growth (2+) Klebsiella pneumoniae ESBL     Comment:   Consider infectious disease consult.  Susceptibility results may not correlate to clinical outcomes.         Light growth (2+) Staphylococcus aureus, MRSA     Comment:   Considering site of infection and appropriate dosing, oxacillin (or cefoxitin) results can be applied to cefazolin, nafcillin, ampicillin/sulbactam, dicloxacillin, amoxicillin/clavulanate, cephalexin, and cefpodoxime.  Methicillin resistant Staphylococcus aureus, Patient may be an isolation risk.         Rare growth Normal Respiratory Margo     Gram Stain Many (4+) WBCs per low power field      Rare (1+) Epithelial cells per low power field      Rare (1+) Gram positive cocci in pairs and clusters    Susceptibility        Klebsiella pneumoniae ESBL      KELSIE      Ciprofloxacin Resistant      Ertapenem Susceptible      Levofloxacin Resistant      Meropenem Susceptible      Tetracycline Resistant      Trimethoprim + Sulfamethoxazole Resistant                       Susceptibility        Staphylococcus aureus, MRSA      KELSIE      Clindamycin Resistant      Linezolid Susceptible      Oxacillin Resistant      Tetracycline Susceptible      Trimethoprim + Sulfamethoxazole Susceptible      Vancomycin Susceptible                       Susceptibility Comments       Klebsiella pneumoniae ESBL    With the exception of urinary-sourced infections, aminoglycosides should not be used as monotherapy.      Staphylococcus aureus, MRSA    This isolate is presumed to be clindamycin resistant based on detection of inducible clindamycin resistance.  Clindamycin may still be effective in some patients.  This isolate is  Weakness, Pre-syncope s/p MitraClip presumed to be clindamycin resistant based on detection of inducible clindamycin resistance.  Clindamycin may still be effective in some patients.               Respiratory Culture - Sputum, ET Suction [[id number]]  (Abnormal) Collected: 01/14/25 1857    Lab Status: Final result Specimen: Sputum from ET Suction Updated: 01/17/25 1221     Respiratory Culture Light growth (2+) Klebsiella pneumoniae ESBL     Comment:   Consider infectious disease consult.  Susceptibility results may not correlate to clinical outcomes.         Light growth (2+) Staphylococcus aureus, MRSA     Comment:   Considering site of infection and appropriate dosing, oxacillin (or cefoxitin) results can be applied to cefazolin, nafcillin, ampicillin/sulbactam, dicloxacillin, amoxicillin/clavulanate, cephalexin, and cefpodoxime.  Methicillin resistant Staphylococcus aureus, Patient may be an isolation risk.        Gram Stain Many (4+) WBCs per low power field      Rare (1+) Epithelial cells per low power field      Few (2+) Gram positive cocci in pairs, chains and clusters    Narrative:      Refer to LLL Wash culture for MICS.     Blood Culture - Blood, Blood, Arterial Line [[id number]]  (Abnormal) Collected: 01/14/25 1434    Lab Status: Final result Specimen: Blood, Arterial Line Updated: 01/17/25 0636     Blood Culture Staphylococcus, coagulase negative     Isolated from Anaerobic Bottle     Gram Stain Anaerobic Bottle Gram positive cocci in pairs and clusters    Narrative:      Probable contaminant requires clinical correlation, susceptibility not performed unless requested by physician.      Blood Culture ID, PCR - Blood, Blood, Arterial Line [[id number]]  (Abnormal) Collected: 01/14/25 1434    Lab Status: Final result Specimen: Blood, Arterial Line Updated: 01/16/25 0657     BCID, PCR Staph spp, not aureus or lugdunensis. Identification by BCID2 PCR.     BOTTLE TYPE Anaerobic Bottle    Blood Culture - Blood, Arm, Left [[id number]]  (Abnormal)  Collected: 01/05/25 1305    Lab Status: Final result Specimen: Blood from Arm, Left Updated: 01/07/25 1100     Blood Culture Staphylococcus, coagulase negative     Isolated from Anaerobic Bottle     Gram Stain Anaerobic Bottle Gram positive cocci in clusters    Narrative:      Less than seven (7) mL's of blood was collected.  Insufficient quantity may yield false negative results.  Probable contaminant requires clinical correlation, susceptibility not performed unless requested by physician.    Blood Culture ID, PCR - Blood, Arm, Left [[id number]]  (Abnormal) Collected: 01/05/25 1305    Lab Status: Final result Specimen: Blood from Arm, Left Updated: 01/06/25 1435     BCID, PCR Staph spp, not aureus or lugdunensis. Identification by BCID2 PCR.     BOTTLE TYPE Anaerobic Bottle    Respiratory Culture - Bronchial Wash, Lung, Left Lower Lobe [[id number]]  (Abnormal)  (Susceptibility) Collected: 01/04/25 0755    Lab Status: Final result Specimen: Bronchial Wash from Lung, Left Lower Lobe Updated: 01/06/25 0908     Respiratory Culture Scant growth (1+) Staphylococcus aureus, MRSA     Comment:   Methicillin resistant Staphylococcus aureus, Patient may be an isolation risk.         No Normal Respiratory Margo     Gram Stain Moderate (3+) WBCs seen      Rare (1+) Gram positive cocci in pairs and clusters    Susceptibility        Staphylococcus aureus, MRSA      KELSIE      Clindamycin Resistant      Linezolid Susceptible      Oxacillin Resistant      Tetracycline Susceptible      Trimethoprim + Sulfamethoxazole Susceptible      Vancomycin Susceptible                       Susceptibility Comments       Staphylococcus aureus, MRSA    This isolate is presumed to be clindamycin resistant based on detection of inducible clindamycin resistance.  Clindamycin may still be effective in some patients.  This isolate is presumed to be clindamycin resistant based on detection of inducible clindamycin resistance.  Clindamycin may still be  effective in some patients.               Respiratory Culture - Sputum, ET Suction [[id number]]  (Abnormal)  (Susceptibility) Collected: 01/02/25 1212    Lab Status: Final result Specimen: Sputum from ET Suction Updated: 01/04/25 0939     Respiratory Culture Moderate growth (3+) Staphylococcus aureus, MRSA      No Normal Respiratory Margo     Gram Stain Many (4+) WBCs per low power field      Rare (1+) Epithelial cells per low power field      Many (4+) Gram positive cocci in pairs and clusters    Susceptibility        Staphylococcus aureus, MRSA      KELSIE      Clindamycin Resistant      Linezolid Susceptible      Oxacillin Resistant      Tetracycline Susceptible      Trimethoprim + Sulfamethoxazole Susceptible      Vancomycin Susceptible                       Susceptibility Comments       Staphylococcus aureus, MRSA    This isolate is presumed to be clindamycin resistant based on detection of inducible clindamycin resistance.  Clindamycin may still be effective in some patients.               MRSA Screen, PCR (Inpatient) - Swab, Nares [[id number]]  (Abnormal) Collected: 01/02/25 1246    Lab Status: Final result Specimen: Swab from Nares Updated: 01/02/25 1504     MRSA PCR Positive    Narrative:      The negative predictive value of this diagnostic test is high and should only be used to consider de-escalating anti-MRSA therapy. A positive result may indicate colonization with MRSA and must be correlated clinically.            SLP FEES - Fiberoptic Endo Eval Swallow    Result Date: 1/23/2025  This procedure was auto-finalized with no dictation required.    XR Humerus Right    Result Date: 1/23/2025  XR HUMERUS RIGHT Date of Exam: 1/23/2025 10:05 AM EST Indication: Pain Comparison: October 15, 2020 Findings: No acute fracture is identified. No focal osseous abnormality is identified. The joint spaces appear congruent. The soft tissues are unremarkable.     Impression: Impression: No acute osseous process identified.  Electronically Signed: Neo Maradiaga MD  1/23/2025 11:14 AM EST  Workstation ID: [id number]    XR Forearm 2 View Right    Result Date: 1/23/2025  XR FOREARM 2 VW RIGHT Date of Exam: 1/23/2025 10:05 AM EST Indication: Pain Comparison: None available. Findings: No acute fracture is identified. No focal osseous abnormality is identified. The joint spaces appear congruent. The soft tissues are unremarkable.     Impression: Impression: No acute osseous process identified. Electronically Signed: Neo Maradiaga MD  1/23/2025 10:58 AM EST  Workstation ID: [id number]    XR Abdomen KUB    Result Date: 1/22/2025  XR ABDOMEN KUB Date of Exam: 1/22/2025 7:42 AM EST Indication: Keofeed not working Comparison: 1/18/2025 Findings: Weighted feeding tube appears to terminate in the gastric body. Left upper quadrant clips. Nonobstructive bowel gas pattern. No acute osseous mallet.     Impression: Impression: Weighted feeding tube terminates in the gastric body. Consider repositioning and advancement if a postpyloric position is desired. Electronically Signed: Jacob Alvarado MD  1/22/2025 8:32 AM EST  Workstation ID: [id number]     Results for orders placed during the hospital encounter of 01/02/25    Adult Transthoracic Echo Complete W/ Cont if Necessary Per Protocol    Interpretation Summary    Left ventricular systolic function is normal. Calculated left ventricular EF = 59.3% Left ventricular ejection fraction appears to be 61 - 65%.    Left ventricular wall thickness is consistent with borderline concentric hypertrophy.    Left ventricular diastolic function was normal.    Mild aortic valve stenosis is present.    Peak velocity of the flow distal to the aortic valve is 292 cm/s. Aortic valve mean pressure gradient is 20 mmHg.    Estimated right ventricular systolic pressure from tricuspid regurgitation is normal (<35 mmHg).      Current medications:  Scheduled Meds:amitriptyline, 25 mg, Nasogastric, Nightly  [START ON 1/24/2025]  amLODIPine, 10 mg, Nasogastric, Daily  bumetanide, 1 mg, Intravenous, Daily  castor oil-balsam peru, 1 Application, Topical, Q12H  DULoxetine, 30 mg, Oral, Nightly  DULoxetine, 60 mg, Oral, Daily  enoxaparin, 120 mg, Subcutaneous, Q12H  gabapentin, 300 mg, Nasogastric, Q8H  insulin glargine, 35 Units, Subcutaneous, Q12H  insulin regular, 12 Units, Subcutaneous, Q6H  insulin regular, 4-24 Units, Subcutaneous, Q6H  [START ON 1/24/2025] lactobacillus acidophilus, 1 capsule, Nasogastric, Daily  metoclopramide, 10 mg, Intravenous, Q6H  metoprolol succinate XL, 50 mg, Oral, BID  nystatin, , Topical, Q12H      Continuous Infusions:   PRN Meds:.  acetaminophen    dextrose    glucagon (human recombinant)    HYDROcodone-acetaminophen    ipratropium    ipratropium-albuterol    Magnesium Standard Dose Replacement - Follow Nurse / BPA Driven Protocol    meclizine    midazolam    ondansetron    oxyCODONE    Polyvinyl Alcohol-Povidone PF    Potassium Replacement - Follow Nurse / BPA Driven Protocol    sodium chloride    sodium chloride    Assessment & Plan   Assessment & Plan     Active Hospital Problems    Diagnosis  POA    **Acute respiratory failure with hypercapnia [J96.02]  Yes    Sepsis [A41.9]  Yes    Type 2 diabetes mellitus with hyperglycemia [E11.65]  Yes    Hypothyroid [E03.9]  Yes    Factor 5 Leiden mutation, heterozygous [D68.51]  Yes    MARIAA (acute kidney injury) [N17.9]  Yes    Primary hypertension [I10]  Yes    PTSD (post-traumatic stress disorder) [F43.10]  Yes    History of DVT in adulthood [Z86.718]  Not Applicable      Resolved Hospital Problems   No resolved problems to display.        Brief Hospital Course to date:  Natalia Arzate is a 39 y.o. female with a history of HLD, Hypothyroidism, Afib, PE/DVT, Factor V Leiden mutation, and stroke who presented to [facility] with altered mental status. Labs there were significant for renal failure, hyperglycemia, and hypercapnic respiratory failure. Dx with  pneumonia  and resp failure.  She was intubated and required the initiation of vasopressors. She was transferred to [facility] on 1/2/25 for ongoing care.    Acute hypoxic resp failure-improving  Severe sepsis with end organ failure- renal failure and resp failure  MRSA PNA--s/p vanc/linezolid  S/p intubation, extubated on 1/17.  ESBL Klebsiella PNA  --Pt underwent bronch on 1/14 with significant mucus plugging, post CXR with improvement in L lung aeration.  --Currently on NC--cont pulmonary toilet, wean O2 as tolerated.  --s/p merrem    Bacteremia- 1 bottle Gemella sanguinis--unclear significance  --s/p 10 days amp/unasyn.    MARIAA due to severe sepsis  S/P L nephrectomy in 2022  --Pt required CRRT ~ 1/8/25-1/11/25   --Renal fx improving, no need for further HD    Severe dysphagia  --cont ST.  --Currently with keofeed getting tube feeds.  Ok for 4oz puree 3x/day.  --Continue Zofran, reglan.  Will also try meclizine, as having some vertigo sx.     R arm pain  --xrays to forearm and humerus  --no swelling or redness, DVT less likely as pt is also on lovenox.  Will monitor the area.  Pain control.    factor V Leiden mutation  History of PE/DVT  History of stroke  --cont home lovenox    DM  -- Continue Lantus and regular insulin while on tube feeds.    Hypertension  History A-fib  --Restarted home metoprolol, norvasc.  On lovenox    Diarrhea  --c diff neg.  Probiotic.  2/2 abx and tube feeds.    Hx PTSD  --cont home cymbalta and elavil    Expected Discharge Location and Transportation: pending  Expected Discharge   Expected Discharge Date: 1/27/2025; Expected Discharge Time:      VTE Prophylaxis:  Pharmacologic & mechanical VTE prophylaxis orders are present.         AM-PAC 6 Clicks Score (PT): 8 (01/22/25 1440)    CODE STATUS:   Code Status and Medical Interventions: CPR (Attempt to Resuscitate); Full Support   Ordered at: 01/02/25 1952     Code Status (Patient has no pulse and is not breathing):    CPR (Attempt to Resuscitate)      Medical Interventions (Patient has pulse or is breathing):    Full Support       Diana Ghotra MD  01/23/25

## 2025-01-23 NOTE — PLAN OF CARE
Goal Outcome Evaluation:  Plan of Care Reviewed With: patient        Progress: improving                     Treatment Assessment (SLP): suspected, improved, pharyngeal dysphagia (01/23/25 0920)     Plan for Continued Treatment (SLP): further assessment needed (see comments) (repeat FEES this PM) (01/23/25 0920)

## 2025-01-23 NOTE — PLAN OF CARE
Goal Outcome Evaluation:  Plan of Care Reviewed With: patient        Progress: improving       Anticipated Discharge Disposition (SLP): inpatient rehabilitation facility          SLP Swallowing Diagnosis: mild, oral dysphagia, mod-severe, pharyngeal dysphagia (01/23/25 1330)    Repeat FEES completed. Some improvement though endurance remains a factor. NPO x 4oz puree snack, 3x/day, as well as 6-8 ice chips or 1/4 tsp H2O/hour. SLP will follow for tx as well as re-assess 1/27 to ? Improved endurance and possible repeat FEES.

## 2025-01-23 NOTE — PROGRESS NOTES
[facility] Medicine Services  PROGRESS NOTE    Patient Name: Natalia Arzate  : 1985  MRN: [id number]    Date of Admission: 2025  Primary Care Physician: Bladimir Calle PA-C    Subjective   Subjective     CC:  Resp failure    HPI:  Pt remained stable overnight.  Pt tolerating tube feeds.  Still with some nausea.  Pain better.      Objective   Objective     Vital Signs:   Temp:  [98.4 °F (36.9 °C)-99.1 °F (37.3 °C)] 98.4 °F (36.9 °C)  Heart Rate:  [87-99] 90  Resp:  [18-20] 18  BP: (135-150)/(83-98) 135/85  Flow (L/min) (Oxygen Therapy):  [1-2] 2     Physical Exam:  Constitutional: No acute distress, awake, alert, more comfortable appearing, resting in chair  HENT: NCAT, mucous membranes moist, keofeed in place  Respiratory: Coarse upper airway noise bilaterally, respiratory effort normal  Cardiovascular: slightly tachycardic, no murmurs, rubs, or gallops  Gastrointestinal: Positive bowel sounds, soft, NT, nondistended  Musculoskeletal: 2+ bilateral ankle edema  Psychiatric: Appropriate affect, cooperative  Neurologic: Oriented x 3, strength symmetric in all extremities, Cranial Nerves grossly intact to confrontation, speech clear  Skin: No rashes      Results Reviewed:  LAB RESULTS:      Lab 25  1201 25  1540 25  0658 25  2045 25  1810 25  0431 25  0431 25  1325 25  0526 25  1238 25  0558   WBC 3.90 4.79 4.82  --   --  5.15 4.84  --  5.41  --  5.17   HEMOGLOBIN 9.8* 9.6* 8.9*  --   --  8.1* 8.2*  --  7.8*  --  7.9*   HEMATOCRIT 31.2* 30.9* 28.9*  --   --  26.7* 26.5*  --  25.2*  --  25.9*   PLATELETS 195 215 210  --   --  184 144  --  138*  --  141   NEUTROS ABS 2.43  --   --   --   --  3.34 3.36  --  3.99  --  4.04   IMMATURE GRANS (ABS) 0.04  --   --   --   --  0.07* 0.04  --  0.04  --  0.04   LYMPHS ABS 0.53*  --   --   --   --  1.03 0.82  --  0.75  --  0.57*   MONOS ABS 0.65  --   --   --   --  0.54  0.42  --  0.43  --  0.36   EOS ABS 0.22  --   --   --   --  0.14 0.17  --  0.17  --  0.14   MCV 92.6 93.6 93.5  --   --  96.4 94.6  --  94.4  --  95.2   CRP  --   --   --   --   --   --   --  4.07*  --   --   --    HEPARIN ANTI-XA  --  0.81 0.52 0.65 0.10* 0.61 0.46  --  0.41   < > 0.10*    < > = values in this interval not displayed.         Lab 01/22/25  1201 01/21/25  1540 01/20/25  0658 01/19/25  0431 01/18/25  0431 01/17/25  1325 01/17/25  0526   SODIUM 131* 133* 131* 134* 138 135* 137   POTASSIUM 4.0 3.9 3.7 3.9 3.8  3.8 3.5 3.7   CHLORIDE 90* 90* 89* 93* 97* 93* 98   CO2 29.0 30.0* 30.0* 30.0* 30.0* 31.0* 29.0   ANION GAP 12.0 13.0 12.0 11.0 11.0 11.0 10.0   BUN 13 12 14 21* 25* 27* 29*   CREATININE 0.54* 0.58 0.61 0.66 0.66 0.71 0.71   EGFR 121.0 119.0 117.5 115.3 115.3 111.8 111.8   GLUCOSE 215* 207* 194* 164* 150* 138* 177*   CALCIUM 9.9 9.8 9.5 9.6 9.7 9.9 9.7   MAGNESIUM 1.0* 1.8 1.3* 1.5* 1.7 2.0 1.4*   PHOSPHORUS 4.2 4.2  --   --  4.3 3.8 3.6         Lab 01/18/25  0431 01/17/25  1325 01/17/25  0526   TOTAL PROTEIN 7.3 7.8 7.2   ALBUMIN 3.4* 3.6 3.3*   GLOBULIN 3.9 4.2 3.9   ALT (SGPT) 19 16 16   AST (SGOT) 53* 44* 44*   BILIRUBIN 0.3 0.3 0.3   ALK PHOS 124* 136* 129*             Lab 01/17/25  1325   CHOLESTEROL 147   TRIGLYCERIDES 345*             Lab 01/17/25  1016 01/16/25  0543   PH, ARTERIAL 7.423 7.336*   PCO2, ARTERIAL 41.7 45.9*   PO2 .0* 92.9   FIO2 45 65   HCO3 ART 27.2* 24.5   BASE EXCESS ART 2.5* -1.4*   CARBOXYHEMOGLOBIN 1.4 1.5     Brief Urine Lab Results  (Last result in the past 365 days)        Color   Clarity   Blood   Leuk Est   Nitrite   Protein   CREAT   Urine HCG        01/02/25 1213             52.3         01/02/25 1213 Yellow   Cloudy   Moderate (2+)   Negative   Negative   100 mg/dL (2+)                   Microbiology Results Abnormal       Procedure Component Value - Date/Time    Respiratory Culture - Wash, Lung, Left Lower Lobe [[id number]]  (Abnormal)   (Susceptibility) Collected: 01/14/25 2458    Lab Status: Final result Specimen: Wash from Lung, Left Lower Lobe Updated: 01/17/25 1221     Respiratory Culture Light growth (2+) Klebsiella pneumoniae ESBL     Comment:   Consider infectious disease consult.  Susceptibility results may not correlate to clinical outcomes.         Light growth (2+) Staphylococcus aureus, MRSA     Comment:   Considering site of infection and appropriate dosing, oxacillin (or cefoxitin) results can be applied to cefazolin, nafcillin, ampicillin/sulbactam, dicloxacillin, amoxicillin/clavulanate, cephalexin, and cefpodoxime.  Methicillin resistant Staphylococcus aureus, Patient may be an isolation risk.         Rare growth Normal Respiratory Margo     Gram Stain Many (4+) WBCs per low power field      Rare (1+) Epithelial cells per low power field      Rare (1+) Gram positive cocci in pairs and clusters    Susceptibility        Klebsiella pneumoniae ESBL      KELSIE      Ciprofloxacin Resistant      Ertapenem Susceptible      Levofloxacin Resistant      Meropenem Susceptible      Tetracycline Resistant      Trimethoprim + Sulfamethoxazole Resistant                       Susceptibility        Staphylococcus aureus, MRSA      KELSIE      Clindamycin Resistant      Linezolid Susceptible      Oxacillin Resistant      Tetracycline Susceptible      Trimethoprim + Sulfamethoxazole Susceptible      Vancomycin Susceptible                       Susceptibility Comments       Klebsiella pneumoniae ESBL    With the exception of urinary-sourced infections, aminoglycosides should not be used as monotherapy.      Staphylococcus aureus, MRSA    This isolate is presumed to be clindamycin resistant based on detection of inducible clindamycin resistance.  Clindamycin may still be effective in some patients.  This isolate is presumed to be clindamycin resistant based on detection of inducible clindamycin resistance.  Clindamycin may still be effective in some  patients.               Respiratory Culture - Sputum, ET Suction [[id number]]  (Abnormal) Collected: 01/14/25 1857    Lab Status: Final result Specimen: Sputum from ET Suction Updated: 01/17/25 1221     Respiratory Culture Light growth (2+) Klebsiella pneumoniae ESBL     Comment:   Consider infectious disease consult.  Susceptibility results may not correlate to clinical outcomes.         Light growth (2+) Staphylococcus aureus, MRSA     Comment:   Considering site of infection and appropriate dosing, oxacillin (or cefoxitin) results can be applied to cefazolin, nafcillin, ampicillin/sulbactam, dicloxacillin, amoxicillin/clavulanate, cephalexin, and cefpodoxime.  Methicillin resistant Staphylococcus aureus, Patient may be an isolation risk.        Gram Stain Many (4+) WBCs per low power field      Rare (1+) Epithelial cells per low power field      Few (2+) Gram positive cocci in pairs, chains and clusters    Narrative:      Refer to LLL Wash culture for MICS.     Blood Culture - Blood, Blood, Arterial Line [[id number]]  (Abnormal) Collected: 01/14/25 1434    Lab Status: Final result Specimen: Blood, Arterial Line Updated: 01/17/25 0636     Blood Culture Staphylococcus, coagulase negative     Isolated from Anaerobic Bottle     Gram Stain Anaerobic Bottle Gram positive cocci in pairs and clusters    Narrative:      Probable contaminant requires clinical correlation, susceptibility not performed unless requested by physician.      Blood Culture ID, PCR - Blood, Blood, Arterial Line [[id number]]  (Abnormal) Collected: 01/14/25 1434    Lab Status: Final result Specimen: Blood, Arterial Line Updated: 01/16/25 0657     BCID, PCR Staph spp, not aureus or lugdunensis. Identification by BCID2 PCR.     BOTTLE TYPE Anaerobic Bottle    Blood Culture - Blood, Arm, Left [[id number]]  (Abnormal) Collected: 01/05/25 1305    Lab Status: Final result Specimen: Blood from Arm, Left Updated: 01/07/25 1100     Blood Culture Staphylococcus,  coagulase negative     Isolated from Anaerobic Bottle     Gram Stain Anaerobic Bottle Gram positive cocci in clusters    Narrative:      Less than seven (7) mL's of blood was collected.  Insufficient quantity may yield false negative results.  Probable contaminant requires clinical correlation, susceptibility not performed unless requested by physician.    Blood Culture ID, PCR - Blood, Arm, Left [[id number]]  (Abnormal) Collected: 01/05/25 1305    Lab Status: Final result Specimen: Blood from Arm, Left Updated: 01/06/25 1435     BCID, PCR Staph spp, not aureus or lugdunensis. Identification by BCID2 PCR.     BOTTLE TYPE Anaerobic Bottle    Respiratory Culture - Bronchial Wash, Lung, Left Lower Lobe [[id number]]  (Abnormal)  (Susceptibility) Collected: 01/04/25 0755    Lab Status: Final result Specimen: Bronchial Wash from Lung, Left Lower Lobe Updated: 01/06/25 0908     Respiratory Culture Scant growth (1+) Staphylococcus aureus, MRSA     Comment:   Methicillin resistant Staphylococcus aureus, Patient may be an isolation risk.         No Normal Respiratory Margo     Gram Stain Moderate (3+) WBCs seen      Rare (1+) Gram positive cocci in pairs and clusters    Susceptibility        Staphylococcus aureus, MRSA      KELSIE      Clindamycin Resistant      Linezolid Susceptible      Oxacillin Resistant      Tetracycline Susceptible      Trimethoprim + Sulfamethoxazole Susceptible      Vancomycin Susceptible                       Susceptibility Comments       Staphylococcus aureus, MRSA    This isolate is presumed to be clindamycin resistant based on detection of inducible clindamycin resistance.  Clindamycin may still be effective in some patients.  This isolate is presumed to be clindamycin resistant based on detection of inducible clindamycin resistance.  Clindamycin may still be effective in some patients.               Respiratory Culture - Sputum, ET Suction [[id number]]  (Abnormal)  (Susceptibility) Collected: 01/02/25  1212    Lab Status: Final result Specimen: Sputum from ET Suction Updated: 01/04/25 0939     Respiratory Culture Moderate growth (3+) Staphylococcus aureus, MRSA      No Normal Respiratory Margo     Gram Stain Many (4+) WBCs per low power field      Rare (1+) Epithelial cells per low power field      Many (4+) Gram positive cocci in pairs and clusters    Susceptibility        Staphylococcus aureus, MRSA      KELSIE      Clindamycin Resistant      Linezolid Susceptible      Oxacillin Resistant      Tetracycline Susceptible      Trimethoprim + Sulfamethoxazole Susceptible      Vancomycin Susceptible                       Susceptibility Comments       Staphylococcus aureus, MRSA    This isolate is presumed to be clindamycin resistant based on detection of inducible clindamycin resistance.  Clindamycin may still be effective in some patients.               MRSA Screen, PCR (Inpatient) - Swab, Nares [[id number]]  (Abnormal) Collected: 01/02/25 1246    Lab Status: Final result Specimen: Swab from Nares Updated: 01/02/25 1504     MRSA PCR Positive    Narrative:      The negative predictive value of this diagnostic test is high and should only be used to consider de-escalating anti-MRSA therapy. A positive result may indicate colonization with MRSA and must be correlated clinically.            XR Abdomen KUB    Result Date: 1/22/2025  XR ABDOMEN KUB Date of Exam: 1/22/2025 7:42 AM EST Indication: Keofeed not working Comparison: 1/18/2025 Findings: Weighted feeding tube appears to terminate in the gastric body. Left upper quadrant clips. Nonobstructive bowel gas pattern. No acute osseous mallet.     Impression: Impression: Weighted feeding tube terminates in the gastric body. Consider repositioning and advancement if a postpyloric position is desired. Electronically Signed: Jacob Alvarado MD  1/22/2025 8:32 AM EST  Workstation ID: [id number]     Results for orders placed during the hospital encounter of 01/02/25    Adult  Transthoracic Echo Complete W/ Cont if Necessary Per Protocol    Interpretation Summary    Left ventricular systolic function is normal. Calculated left ventricular EF = 59.3% Left ventricular ejection fraction appears to be 61 - 65%.    Left ventricular wall thickness is consistent with borderline concentric hypertrophy.    Left ventricular diastolic function was normal.    Mild aortic valve stenosis is present.    Peak velocity of the flow distal to the aortic valve is 292 cm/s. Aortic valve mean pressure gradient is 20 mmHg.    Estimated right ventricular systolic pressure from tricuspid regurgitation is normal (<35 mmHg).      Current medications:  Scheduled Meds:amitriptyline, 25 mg, Oral, Nightly  amLODIPine, 10 mg, Oral, Daily  bumetanide, 1 mg, Intravenous, Daily  castor oil-balsam peru, 1 Application, Topical, Q12H  DULoxetine, 30 mg, Oral, Nightly  DULoxetine, 60 mg, Oral, Daily  enoxaparin, 120 mg, Subcutaneous, Q12H  gabapentin, 300 mg, Nasogastric, Q8H  insulin glargine, 30 Units, Subcutaneous, Q12H  insulin regular, 10 Units, Subcutaneous, Q6H  insulin regular, 4-24 Units, Subcutaneous, Q6H  lactobacillus acidophilus, 1 capsule, Oral, Daily  magnesium sulfate, 4 g, Intravenous, Q4H  metoclopramide, 10 mg, Intravenous, Q6H  metoprolol succinate XL, 50 mg, Oral, BID  nystatin, , Topical, Q12H      Continuous Infusions:   PRN Meds:.  acetaminophen    dextrose    glucagon (human recombinant)    HYDROcodone-acetaminophen    ipratropium    ipratropium-albuterol    Magnesium Standard Dose Replacement - Follow Nurse / BPA Driven Protocol    meclizine    midazolam    ondansetron    oxyCODONE    Polyvinyl Alcohol-Povidone PF    Potassium Replacement - Follow Nurse / BPA Driven Protocol    sodium chloride    sodium chloride    Assessment & Plan   Assessment & Plan     Active Hospital Problems    Diagnosis  POA    **Acute respiratory failure with hypercapnia [J96.02]  Yes    Sepsis [A41.9]  Yes    Type 2 diabetes  mellitus with hyperglycemia [E11.65]  Yes    Hypothyroid [E03.9]  Yes    Factor 5 Leiden mutation, heterozygous [D68.51]  Yes    MARIAA (acute kidney injury) [N17.9]  Yes    Primary hypertension [I10]  Yes    PTSD (post-traumatic stress disorder) [F43.10]  Yes    History of DVT in adulthood [Z86.718]  Not Applicable      Resolved Hospital Problems   No resolved problems to display.        Brief Hospital Course to date:  Natalia Arzate is a 39 y.o. female with a history of HLD, Hypothyroidism, Afib, PE/DVT, Factor V Leiden mutation, and stroke who presented to [facility] with altered mental status. Labs there were significant for renal failure, hyperglycemia, and hypercapnic respiratory failure. Dx with  pneumonia and resp failure.  She was intubated and required the initiation of vasopressors. She was transferred to [facility] on 1/2/25 for ongoing care.    Acute hypoxic resp failure-improving  Severe sepsis with end organ failure- renal failure and resp failure  MRSA PNA--s/p vanc/linezolid  S/p intubation, extubated on 1/17.  ESBL Klebsiella PNA  --Pt underwent bronch on 1/14 with significant mucus plugging, post CXR with improvement in L lung aeration.  --Currently on NC--cont pulmonary toilet, wean O2 as tolerated.  --Cont merrem through 1/22/25-stop today    Bacteremia- 1 bottle Gemella sanguinis--unclear significance  --s/p 10 days amp/unasyn.    MARIAA due to severe sepsis  S/P L nephrectomy in 2022  --Pt required CRRT ~ 1/8/25-1/11/25   --Renal fx improving, no need for further HD    Severe dysphagia  --cont ST.  --Currently with keofeed getting tube feeds  --Continue Zofran, reglan.  Will also try meclizine, as having some vertigo sx.     factor V Leiden mutation  History of PE/DVT  History of stroke  --cont home lovenox    DM  -- Continue Lantus and regular insulin while on tube feeds.    Hypertension  History A-fib  --Restarted home metoprolol, norvasc.  On lovenox    Diarrhea  --c diff neg.  Probiotic.  2/2 abx and  tube feeds.    Hx PTSD  --cont home cymbalta and elavil    Expected Discharge Location and Transportation: pending  Expected Discharge   Expected Discharge Date: 1/27/2025; Expected Discharge Time:      VTE Prophylaxis:  Pharmacologic & mechanical VTE prophylaxis orders are present.         AM-PAC 6 Clicks Score (PT): 8 (01/22/25 1440)    CODE STATUS:   Code Status and Medical Interventions: CPR (Attempt to Resuscitate); Full Support   Ordered at: 01/02/25 1952     Code Status (Patient has no pulse and is not breathing):    CPR (Attempt to Resuscitate)     Medical Interventions (Patient has pulse or is breathing):    Full Support       Diana Ghotra MD  01/22/25

## 2025-01-23 NOTE — THERAPY TREATMENT NOTE
Acute Care - Speech Language Pathology   Swallow Treatment Note [facility]     Patient Name: Natalia Arzate  : 1985  MRN: [id number]  Today's Date: 2025               Admit Date: 2025    Visit Dx:     ICD-10-CM ICD-9-CM   1. Respiratory acidosis with metabolic acidosis  E87.4 276.3   2. Acute respiratory failure with hypoxia  J96.01 518.81   3. MARIAA (acute kidney injury)  N17.9 584.9   4. Pharyngeal dysphagia  R13.13 787.23     Patient Active Problem List   Diagnosis    Nephrolithiasis    Ovarian teratoma, right    Other chronic pain    Pelvic pain    History of DVT in adulthood    History of pulmonary embolism    Ureteral calculus    Ischemic cerebrovascular accident (CVA)    Primary hypertension    Left lumbar radiculopathy    PTSD (post-traumatic stress disorder)    MARIAA (acute kidney injury)    Anemia    Dyslipidemia    Hypothyroid    Other secondary gout, multiple sites    Factor 5 Leiden mutation, heterozygous    Vitamin D deficiency    Vitamin B 12 deficiency    Type 2 diabetes mellitus with hyperglycemia    Hoarseness, persistent    Ureterolithiasis    Acute cystitis without hematuria    Hepatic steatosis    Right flank pain, chronic    Detrusor instability of bladder    Frequency of micturition    Acute respiratory failure with hypercapnia    Hyperkalemia    Sepsis    Respiratory acidosis with metabolic acidosis    Acute respiratory failure    Diabetes mellitus type 2, insulin dependent    Diabetic peripheral neuropathy associated with type 2 diabetes mellitus     Past Medical History:   Diagnosis Date    A-fib     Abnormal ECG     Anemia     Anxiety     Asthma     Cancer     Ovarian    Depression     Diabetes mellitus     DVT (deep venous thrombosis)     Factor 5 Leiden mutation, heterozygous     Fibroid     GERD (gastroesophageal reflux disease)     Gout     H/O abdominal abscess     History of sepsis     History of transfusion     Hyperlipidemia     Hypothyroid     Kidney stone      Migraines     Neuropathy     Ovarian cancer 2021    Ovarian cyst     PE (pulmonary embolism)     Polycystic ovary syndrome     Preeclampsia     Rh incompatibility     Stroke     TIA (transient ischemic attack)     Urinary tract infection     Varicella      Past Surgical History:   Procedure Laterality Date    ABDOMINAL SURGERY      CARDIAC CATHETERIZATION       SECTION      CHOLECYSTECTOMY      COLONOSCOPY      ENDOSCOPY      EXTRACORPOREAL SHOCK WAVE LITHOTRIPSY (ESWL) Left 10/22/2021    Procedure: EXTRACORPOREAL SHOCKWAVE LITHOTRIPSY;  Surgeon: Milan Motley MD;  Location: [facility] OR;  Service: Urology;  Laterality: Left;    HYSTERECTOMY  2017    ovarian ca    INSERT CENTRAL LINE AT BEDSIDE  2025    LITHOTRIPSY      NEPHRECTOMY Left 10/2022    RIGHT OOPHORECTOMY      URETEROSCOPY LASER LITHOTRIPSY WITH STENT INSERTION Left 10/01/2021    Procedure: URETEROSCOPY WITH STENT PLACEMENT;  Surgeon: Milan Motley MD;  Location:  [facility] OR;  Service: Urology;  Laterality: Left;    URETEROSCOPY LASER LITHOTRIPSY WITH STENT INSERTION Left 2022    Procedure: URETEROSCOPY STENT REMOVAL;  Surgeon: Milan Motley MD;  Location:  [facility] OR;  Service: Urology;  Laterality: Left;    URETEROSCOPY LASER LITHOTRIPSY WITH STENT INSERTION Left 2022    Procedure: CYSTOSCOPY RETROGRADE LEFT WITH STENT PLACEMENT;  Surgeon: Milan Motley MD;  Location:  [facility] OR;  Service: Urology;  Laterality: Left;       SLP Recommendation and Plan                                               Daily Summary of Progress (SLP): progress toward functional goals is good (25)               Treatment Assessment (SLP): suspected, improved, pharyngeal dysphagia (25)     Plan for Continued Treatment (SLP): further assessment needed (see comments) (repeat FEES this PM) (25)         Progress: improving      SWALLOW EVALUATION (Last 72 Hours)       SLP Adult Swallow  Evaluation       Row Name 01/23/25 0920 01/22/25 1625 01/20/25 1320 01/20/25 1105          Rehab Evaluation    Document Type therapy note (daily note)  - therapy note (daily note)  - evaluation  - evaluation  -     Subjective Information no complaints  - no complaints  - no complaints  - no complaints  -     Patient Observations alert;cooperative  - alert;cooperative  - alert;cooperative  - alert;cooperative  -     Patient/Family/Caregiver Comments/Observations -- family present  - family present  - Family present  -     Patient Effort good  -SM good  - good  - good  -     Symptoms Noted During/After Treatment -- none  - none  - none  -        General Information    Patient Profile Reviewed -- -- yes  - yes  -     Pertinent History Of Current Problem -- -- See initial eval, pt referred for FEES  - Adm w/ MRSA PNA &  worsening renal failure, requiring CRRT. Intubated 1/1-1/17. Hx: HLD, hypothyroidism, a-fib, PE/DVT, Factor V Leiden mutation, CVA. CXR: improving vascular congestion & persistent L basilar atelectasis w/ small L pleural effusion  -     Current Method of Nutrition -- -- NPO;nasogastric feedings  - NPO;nasogastric feedings  -     Precautions/Limitations, Vision -- -- WFL;for purposes of eval  - WFL;for purposes of eval  -     Precautions/Limitations, Hearing -- -- WFL;[facility] purposes [facility]     Prior Level of Function-Communication -- -- unknown  - unknown  -     Prior Level of Function-Swallowing -- -- other (see comments)  per chart, pt w/ remote hx pharyngeal dysphagia in 2022  - other (see comments)  per chart, pt w/ remote hx pharyngeal dysphagia in 2022  -     Plans/Goals Discussed with -- -- patient;family;agreed upon  - patient;family;agreed upon  -     Barriers to Rehab -- -- medically complex  - medically complex  -     Patient's Goals for Discharge -- -- patient did not state  - patient  did not state  -     Family Goals for Discharge -- -- family did not state  - family did not state  -        Pain    Additional Documentation -- Pain Scale: FACES Pre/Post-Treatment (Group)  - Pain Scale: FACES Pre/Post-Treatment (Group)  - Pain Scale: FACES Pre/Post-Treatment (Group)  -        Pain Scale: FACES Pre/Post-Treatment    Pain: FACES Scale, Pretreatment 2-->hurts little bit  -SM 0-->no hurt  -MH 0-->no hurt  -MH 0-->no hurt  -MH     Posttreatment Pain Rating 2-->hurts little bit  -SM 0-->no hurt  -MH 0-->no hurt  -MH 0-->no hurt  -     Pre/Posttreatment Pain Comment RN in room and managing  - -- -- --        Oral Motor Structure and Function    Secretion Management -- -- -- WNL/WFL  -     Mucosal Quality -- -- -- moist, healthy  -        Oral Musculature and Cranial Nerve Assessment    Oral Motor General Assessment -- -- -- vocal impairment  -     Vocal Impairment, Detail. Cranial Nerve X (Vagus) -- -- -- other (see comments)  Hoarse, decreased vocal intensity  -        General Eating/Swallowing Observations    Respiratory Support Currently in Use -- -- -- room air  -     Eating/Swallowing Skills -- -- -- self-fed;fed by SLP  -     Positioning During Eating -- -- -- upright 90 degree;upright in bed  -     Utensils Used -- -- -- spoon;cup;straw  -     Consistencies Trialed -- -- -- ice chips;thin liquids;nectar/syrup-thick liquids;pureed  -        Respiratory    Date of Intubation -- -- -- 1/1-1/17  [facility]        Clinical Swallow Eval    Pharyngeal Phase -- -- -- suspected pharyngeal impairment  -     Clinical Swallow Evaluation Summary -- -- -- Pt w/ overt s/s of aspiration c/b intermittent cough w/ thin liquid trials. No glaring s/s of aspiration w/ NTL or pureed trials. Although no overt s/s of aspiration w/ NTL or pureed trials, pt felt to be @ risk of aspiration given prolonged intubation/ prior respiratory status.  (MBS x2 completed @ [facility] in 2022, pt initially w/  moderate pharyngeal dysphagia. MBS completed later in adm revealed mild pharyngeal dysphagia w/ recs for regular diet w/ thin liquids) Recommend cont NPO w/ NG. Ok for 3-4 ice chips/hr after oral care. Plan for FEES this PM  -MH        Pharyngeal Phase Concerns    Pharyngeal Phase Concerns -- -- -- cough  -     Cough -- -- -- thin  -        Fiberoptic Endoscopic Evaluation of Swallowing (FEES)    Risks/Benefits Reviewed -- -- risks/benefits explained;patient;family;agreed to eval  - --     Nasal Entry -- -- right:  - --     Scope serial number/identification -- -- [id number]  - --        Anatomy and Physiology    Anatomic Considerations -- -- edema;erythema;vocal folds;false vocal folds;arytenoids  - --     Velopharyngeal -- -- CNA  - --     Base of Tongue -- -- symmetrical  - --     Epiglottis -- -- edematous  - --     Laryngeal Function Breathing -- -- symmetrical  - --     Laryngeal Function Phonation -- -- symmetrical  [facility] --     Laryngeal Function to Breath Hold -- -- TVF/FVF/Arytenoid;can't sustain closure  - --     Secretion Rating Scale (Apollo et alJaya 1996) -- -- 2- secretions initially outside the vestibule but later entered the vestibule  - --     Secretion Description -- -- thin;clear  - --     Ice Chips -- -- elicited swallow;partially cleared secretions;aspirated;no response to aspiration  - --     Spontaneous Swallow -- -- frequency reduced  - --     Sensory -- -- sensed scope  - --     Utensils Used -- -- Spoon;Cup  - --     Consistencies Trialed -- -- spoon;cup;ice chips;thin liquids;honey-thick liquids;pudding/puree  - --        FEES Interpretation    Oral Phase -- -- oral residue present;prespill of liquids into pharynx;solids not tested  - --        Initiation of Pharyngeal Swallow    Initiation of Pharyngeal Swallow -- -- bolus in pyriform sinuses  - --     Pharyngeal Phase -- -- impaired pharyngeal phase of swallowing  - --     Penetration Before the Swallow --  -- thin liquids;secondary to reduced back of tongue control;secondary to delayed swallow initiation or mistiming  - --     Penetration During the Swallow -- -- thin liquids;secondary to reduced vestibular closure;secondary to delayed swallow initiation or mistiming;secondary to reduced laryngeal elevation  - --     Penetration After the Swallow -- -- honey-thick liquids;pudding/puree;secondary to residue;in valleculae;in pyriform sinuses  - --     Aspiration After the Swallow -- -- thin liquids;honey-thick liquids;pudding/puree;secondary to residue;in valleculae;in pyriform sinuses;in laryngeal vestibule  - --     Depth of Penetration -- -- deep  - --     Response to Penetration -- -- No  -MH --     No spontaneous response to penetration and -- -- non-effective laryngeal clearance with cue (see comments);other (see comments)  cued cough  - --     Response to Aspiration -- -- No  -MH --     No spontaneous response to aspiration with -- -- non-effective subglottic clearance with cue (see comments);other (see comments)  cued cough  - --     Rosenbek's Scale -- -- thin:;honey:;pudding/puree:;8-->Level 8  - --     Residue -- -- all consistencies tested;diffuse within pharynx;secondary to reduced hyolaryngeal excursion;secondary to reduced laryngeal elevation;secondary to reduced posterior pharyngeal wall stripping;other (see comments)  increased residue w/ thin liquids  - --     Response to Residue -- -- partial residue clearance;with cued swallow  - --     Attempted Compensatory Maneuvers -- -- bolus size;bolus presentation style;additional subsequent swallow;multiple swallows  - --     Response to Attempted Compensatory Maneuvers -- -- did not prevent penetration;did not prevent aspiration;did not reduce residue  - --     Successful Compensatory Maneuver Competency -- -- patient able to;demonstrate compensations;with cues  - --     FEES Summary -- -- Severe pharyngeal dysphagia. Prespill w/  thins to the pyriforms. Deep penetration w/ thins before &  during the swallow w/ subsequent silent aspiration after the swallow. Cued cough weak and ineffective in clearing. Penetration & evntual silent aspiration of HTL & pudding after the swallow. Suspect fatigue impacting, as pt did not initially aspirate pudding. Diffuse residue w/ all consistenices assessed; increased residue w/ thins. Compensations ineffective in preventing penetration or aspiration. Safest recommendation cont NPO w/ NG, ok for 3-4 ice chips/hr after oral care w/ supervision per RN discretion  - --        SLP Evaluation Clinical Impression    SLP Swallowing Diagnosis -- -- severe;pharyngeal dysphagia  - suspected pharyngeal dysphagia  -     Functional Impact -- -- risk of aspiration/pneumonia  - risk of aspiration/pneumonia  -     Rehab Potential/Prognosis, Swallowing -- -- good, to achieve stated therapy goals  - good, to achieve stated therapy goals  -     Swallow Criteria for Skilled Therapeutic Interventions Met -- -- demonstrates skilled criteria  - demonstrates skilled criteria  -        SLP Treatment Clinical Impressions    Treatment Assessment (SLP) suspected;improved;pharyngeal dysphagia  - suspected;continued;pharyngeal dysphagia  - -- --     Treatment Assessment Comments (SLP) -- Pt noted w/ hoarse vocal quality & decreased intensity. Reviewed/completed all exercises. Provided handout and encouraged independent practice. Pt w/ intermittent cough & wet vocal quality w/ thin liquid trials. Rec cont NPO w/ NG, ok for 3-4 ice chips/hr after oral care as tolerated. SLP will f/u & monitor for appropriateness for repeat FEES  - -- --     Daily Summary of Progress (SLP) progress toward functional goals is good  - progress toward functional goals as expected  [facility] -- --     Plan for Continued Treatment (SLP) further assessment needed (see comments)  repeat FEES this PM  -SM continue treatment per plan of care  -  -- --     Care Plan Review evaluation/treatment results reviewed;patient/other agree to care plan  - care plan/treatment goals reviewed  - -- --     Care Plan Review, Other Participant(s) -- family  - -- --        Recommendations    Therapy Frequency (Swallow) -- 5 days per week  - 5 days per week  - --     Predicted Duration Therapy Intervention (Days) -- 1 week  - 1 week  - 1 week  -     SLP Diet Recommendation -- NPO;temporary alternate methods of nutrition/hydration;other (see comments)  Ok for 3-4 ice chips/hr after oral care as tolerated  - NPO;temporary alternate methods of nutrition/hydration;other (see comments)  Ok for 3-4 ice chips/hr after oral care per RN discretion  - NPO;temporary alternate methods of nutrition/hydration;other (see comments)  Ok for 3-4 ice chips/hr after oral care as tolerated  -     Recommended Diagnostics -- -- -- reassess via FEES  -     Recommended Precautions and Strategies -- general aspiration precautions  - general aspiration precautions  - general aspiration precautions  -     Oral Care Recommendations -- Oral Care BID/PRN;Suction toothbrush  - Oral Care BID/PRN;Suction toothbrush  - Oral Care BID/PRN;Suction toothbrush  -     SLP Rec. for Method of Medication Administration -- meds via alternate route  - meds via alternate route  - meds via alternate route  [facility]     Monitor for Signs of Aspiration -- yes;notify SLP if any concerns  - yes;notify SLP if any concerns  - yes;notify SLP if any concerns  -     Anticipated Discharge Disposition (SLP) -- inpatient rehabilitation facility  - inpatient rehabilitation facility  - inpatient rehabilitation facility  -               User Key  (r) = Recorded By, (t) = Taken By, (c) = Cosigned By      Initials Name Effective Dates     Christina Pereyra, MS CCC-SLP 01/20/25 -      Kassy Whitehead, MS CCC-SLP 05/12/23 -                     EDUCATION  The patient has been educated in  the following areas:   Dysphagia (Swallowing Impairment) Oral Care/Hydration NPO rationale.        SLP GOALS       Row Name 01/23/25 0920 01/22/25 1625 01/20/25 1320       (LTG) Patient will demonstrate progress toward functional swallow for    Diet Texture (Demonstrate progress toward functional swallow) regular textures  -SM regular textures  -MH regular textures  -MH    Liquid viscosity (Demonstrate progress toward functional swallow) thin liquids  -SM thin liquids  -MH thin liquids  -MH    [place] (Demonstrate progress towards functional swallow) with minimal cues (75-90% accuracy)  -SM with minimal cues (75-90% accuracy)  -MH with minimal cues (75-90% accuracy)  -    Time Frame (Demonstrate progress toward functional swallow) 1 week  -SM 1 week  - 1 week  -    Progress/Outcomes (Demonstrate progress toward functional swallow) good progress toward goal  -SM continuing progress toward goal  -MH new goal  -MH       (STG) Patient will tolerate therapeutic trials of    Consistencies Trialed (Tolerate therapeutic trials) pureed textures;thin liquids  -SM pureed textures;thin liquids  -MH pureed textures;thin liquids  -MH    Desired Outcome (Tolerate therapeutic trials) without signs/symptoms of aspiration  -SM without signs/symptoms of aspiration  - without signs/symptoms of aspiration  -MH    [place] (Tolerate therapeutic trials) with minimal cues (75-90% accuracy)  -SM with minimal cues (75-90% accuracy)  -MH with minimal cues (75-90% accuracy)  -    Time Frame (Tolerate therapeutic trials) 1 week  -SM 1 week  - 1 week  -    Progress/Outcomes (Tolerate therapeutic trials) good progress toward goal  -SM continuing progress toward goal  - new goal  -    Comment (Tolerate therapeutic trials) No s/s aspiration with tsp H2O  -SM Intermittent cough & wet vocal quality w/ thins  - --       (STG) Lingual Strengthening Goal 1 (SLP)    Activity (Lingual Strengthening Goal 1, SLP) increase  lingual tone/sensation/control/coordination/movement;increase tongue back strength  - increase lingual tone/sensation/control/coordination/movement;increase tongue back strength  - increase lingual tone/sensation/control/coordination/movement;increase tongue back strength  -    Increase Lingual Tone/Sensation/Control/Coordination/Movement lingual resistance exercises;swallow trials  -SM lingual resistance exercises;swallow trials  - lingual resistance exercises;swallow trials  -    Increase Tongue Back Strength lingual resistance exercises  -SM lingual resistance exercises  - lingual resistance exercises  -    [place]/Accuracy (Lingual Strengthening Goal 1, SLP) with minimal cues (75-90% accuracy)  -SM with minimal cues (75-90% accuracy)  - with minimal cues (75-90% accuracy)  -    Time Frame (Lingual Strengthening Goal 1, SLP) 1 week  -SM 1 week  - 1 week  -    Progress/Outcomes (Lingual Strengthening Goal 1, SLP) good progress toward goal  -SM continuing progress toward goal  - new goal  -       (STG) Pharyngeal Strengthening Exercise Goal 1 (SLP)    Activity (Pharyngeal Strengthening Goal 1, SLP) increase timing;increase superior movement of the hyolaryngeal complex;increase anterior movement of the hyolaryngeal complex;increase closure at entrance to airway/closure of airway at glottis;increase squeeze/positive pressure generation  - increase timing;increase superior movement of the hyolaryngeal complex;increase anterior movement of the hyolaryngeal complex;increase closure at entrance to airway/closure of airway at glottis;increase squeeze/positive pressure generation  - increase timing;increase superior movement of the hyolaryngeal complex;increase anterior movement of the hyolaryngeal complex;increase closure at entrance to airway/closure of airway at glottis;increase squeeze/positive pressure generation  -    Increase Timing prepping - 3 second prep or suck swallow or  3-step swallow  -SM prepping - 3 second prep or suck swallow or 3-step swallow  - prepping - 3 second prep or suck swallow or 3-step swallow  -    Increase Superior Movement of the Hyolaryngeal Complex effortful pitch glide (falsetto + pharyngeal squeeze)  -SM effortful pitch glide (falsetto + pharyngeal squeeze)  -MH effortful pitch glide (falsetto + pharyngeal squeeze)  -    Increase Anterior Movement of the Hyolaryngeal Complex chin tuck against resistance (CTAR)  -SM chin tuck against resistance (CTAR)  -MH chin tuck against resistance (CTAR)  -    Increase Closure at Entrance to Airway/Closure of Airway at Glottis supraglottic swallow  -SM supraglottic swallow  -MH supraglottic swallow  -    Increase Squeeze/Positive Pressure Generation hard effortful swallow  -SM hard effortful swallow  -MH hard effortful swallow  -    [place]/Accuracy (Pharyngeal Strengthening Goal 1, SLP) with minimal cues (75-90% accuracy)  -SM with minimal cues (75-90% accuracy)  - with minimal cues (75-90% accuracy)  -    Time Frame (Pharyngeal Strengthening Goal 1, SLP) 1 week  -SM 1 week  - 1 week  -    Progress/Outcomes (Pharyngeal Strengthening Goal 1, SLP) good progress toward goal  -SM continuing progress toward goal  - new goal  -    Comment (Pharyngeal Strengthening Goal 1, SLP) Pt has been completing exercises between sessions. Showing overall improvement in clinical strength and endurance.  -SM Reviewed/completed all exercises. Provided handout and encouraged independent practice  - --              User Key  (r) = Recorded By, (t) = Taken By, (c) = Cosigned By      Initials Name Provider Type    Christina Calvillo, MS CCC-SLP Speech and Language Pathologist    Kassy Moe, MS CCC-SLP Speech and Language Pathologist                         Time Calculation:    Time Calculation- SLP       Row Name 01/23/25 1688             Time Calculation- SLP    SLP Start Time 0920  -      SLP  Received On 01/23/25  -         Untimed Charges    62056-EE Treatment Swallow Minutes 25  -SM         Total Minutes    Untimed Charges Total Minutes 25  -SM       Total Minutes 25  -SM                User Key  (r) = Recorded By, (t) = Taken By, (c) = Cosigned By      Initials Name Provider Type    Christina Calvillo MS CCC-SLP Speech and Language Pathologist                    Therapy Charges for Today       Code Description Service Date Service Provider Modifiers Qty    [id number]  ST TREATMENT SWALLOW 2 1/23/2025 Christina Pereyra MS CCC-SLP GN 1                 Christina Pereyra MS CCC-SLP  1/23/2025

## 2025-01-23 NOTE — MBS/VFSS/FEES
Acute Care - Speech Language Pathology   Swallow Re-Evaluation and Treatment Note  [facility]  Fiberoptic Endoscopic Evaluation of Swallowing (FEES)     Patient Name: Natalia Arzate  : 1985  MRN: [id number]  Today's Date: 2025               Admit Date: 2025    Visit Dx:     ICD-10-CM ICD-9-CM   1. Respiratory acidosis with metabolic acidosis  E87.4 276.3   2. Acute respiratory failure with hypoxia  J96.01 518.81   3. MARIAA (acute kidney injury)  N17.9 584.9   4. Pharyngeal dysphagia  R13.13 787.23     Patient Active Problem List   Diagnosis    Nephrolithiasis    Ovarian teratoma, right    Other chronic pain    Pelvic pain    History of DVT in adulthood    History of pulmonary embolism    Ureteral calculus    Ischemic cerebrovascular accident (CVA)    Primary hypertension    Left lumbar radiculopathy    PTSD (post-traumatic stress disorder)    MARIAA (acute kidney injury)    Anemia    Dyslipidemia    Hypothyroid    Other secondary gout, multiple sites    Factor 5 Leiden mutation, heterozygous    Vitamin D deficiency    Vitamin B 12 deficiency    Type 2 diabetes mellitus with hyperglycemia    Hoarseness, persistent    Ureterolithiasis    Acute cystitis without hematuria    Hepatic steatosis    Right flank pain, chronic    Detrusor instability of bladder    Frequency of micturition    Acute respiratory failure with hypercapnia    Hyperkalemia    Sepsis    Respiratory acidosis with metabolic acidosis    Acute respiratory failure    Diabetes mellitus type 2, insulin dependent    Diabetic peripheral neuropathy associated with type 2 diabetes mellitus     Past Medical History:   Diagnosis Date    A-fib     Abnormal ECG     Anemia     Anxiety     Asthma     Cancer     Ovarian    Depression     Diabetes mellitus     DVT (deep venous thrombosis)     Factor 5 Leiden mutation, heterozygous     Fibroid     GERD (gastroesophageal reflux disease)     Gout     H/O abdominal abscess     History of sepsis      History of transfusion     Hyperlipidemia     Hypothyroid     Kidney stone     Migraines     Neuropathy     Ovarian cancer 2021    Ovarian cyst     PE (pulmonary embolism)     Polycystic ovary syndrome     Preeclampsia     Rh incompatibility     Stroke     TIA (transient ischemic attack)     Urinary tract infection     Varicella      Past Surgical History:   Procedure Laterality Date    ABDOMINAL SURGERY      CARDIAC CATHETERIZATION       SECTION      CHOLECYSTECTOMY      COLONOSCOPY      ENDOSCOPY      EXTRACORPOREAL SHOCK WAVE LITHOTRIPSY (ESWL) Left 10/22/2021    Procedure: EXTRACORPOREAL SHOCKWAVE LITHOTRIPSY;  Surgeon: Milan Motley MD;  Location[facility];  Service: Urology;  Laterality: Left;    HYSTERECTOMY  2017    ovarian ca    INSERT CENTRAL LINE AT BEDSIDE  2025    LITHOTRIPSY      NEPHRECTOMY Left 10/2022    RIGHT OOPHORECTOMY      URETEROSCOPY LASER LITHOTRIPSY WITH STENT INSERTION Left 10/01/2021    Procedure: URETEROSCOPY WITH STENT PLACEMENT;  Surgeon: Milan Motley MD;  Location: [facility] OR;  Service: Urology;  Laterality: Left;    URETEROSCOPY LASER LITHOTRIPSY WITH STENT INSERTION Left 2022    Procedure: URETEROSCOPY STENT REMOVAL;  Surgeon: Milan Motley MD;  Location: [facility] OR;  Service: Urology;  Laterality: Left;    URETEROSCOPY LASER LITHOTRIPSY WITH STENT INSERTION Left 2022    Procedure: CYSTOSCOPY RETROGRADE LEFT WITH STENT PLACEMENT;  Surgeon: Milan Motley MD;  Location: [facility] OR;  Service: Urology;  Laterality: Left;       SLP Recommendation and Plan  SLP Swallowing Diagnosis: mild, oral dysphagia, mod-severe, pharyngeal dysphagia (25 1330)  SLP Diet Recommendation: NPO, temporary alternate methods of nutrition/hydration, other (see comments) (x 4oz puree snack, 3x/day, as well as 6-8 ice chips or 1/4 tsp H2O/hour.) (25 1330)  Recommended Precautions and Strategies: general aspiration precautions, other  (see comments) (to cough/clear throat a few times at end of puree snack) (01/23/25 1330)  SLP Rec. for Method of Medication Administration: meds via alternate route (01/23/25 1330)     Monitor for Signs of Aspiration: yes, notify SLP if any concerns (01/23/25 1330)  Recommended Diagnostics: reassess via clinical swallow evaluation, other (see comments) (Will follow for tx and check back 1/27 for ? improved endurance and repeat FEES readiness.) (01/23/25 1330)  Swallow Criteria for Skilled Therapeutic Interventions Met: demonstrates skilled criteria (01/23/25 1330)  Anticipated Discharge Disposition (SLP): inpatient rehabilitation facility (01/23/25 1330)  Rehab Potential/Prognosis, Swallowing: good, to achieve stated therapy goals (01/23/25 1330)  Therapy Frequency (Swallow): 5 days per week (01/23/25 1330)  Predicted Duration Therapy Intervention (Days): 1 week (01/23/25 1330)  Oral Care Recommendations: Oral Care BID/PRN, Toothbrush (01/23/25 1330)        Daily Summary of Progress (SLP): progress toward functional goals is good (01/23/25 0920)               Treatment Assessment (SLP): suspected, improved, pharyngeal dysphagia (01/23/25 0920)     Plan for Continued Treatment (SLP): further assessment needed (see comments) (repeat FEES this PM) (01/23/25 0920)         Progress: improving      SWALLOW EVALUATION (Last 72 Hours)       SLP Adult Swallow Evaluation       Row Name 01/23/25 1330 01/23/25 0920 01/22/25 1625             Rehab Evaluation    Document Type re-evaluation  -SM therapy note (daily note)  -SM therapy note (daily note)  -      Subjective Information no complaints  -SM no complaints  -SM no complaints  -      Patient Observations alert;cooperative  -SM alert;cooperative  -SM alert;cooperative  -      Patient/Family/Caregiver Comments/Observations -- -- family present  -      Patient Effort -- good  -SM good  -      Symptoms Noted During/After Treatment -- -- none  -         General  Information    Current Method of Nutrition NPO;nasogastric feedings;small-bore  - -- --      Plans/Goals Discussed with patient;agreed upon  - -- --      Barriers to Rehab none identified  -SM -- --      Patient's Goals for Discharge return to PO diet  -SM -- --         Pain    Pretreatment Pain Rating 0/10 - no pain  -SM -- --      Posttreatment Pain Rating 0/10 - no pain  - -- --      Additional Documentation -- -- Pain Scale: FACES Pre/Post-Treatment (Group)  -         Pain Scale: FACES Pre/Post-Treatment    Pain: FACES Scale, Pretreatment -- 2-->hurts little bit  - 0-->no hurt  -      Posttreatment Pain Rating -- 2-->hurts little bit  - 0-->no hurt  -      Pre/Posttreatment Pain Comment -- RN in room and managing  -SM --         Fiberoptic Endoscopic Evaluation of Swallowing (FEES)    Risks/Benefits Reviewed risks/benefits explained;patient;agreed to eval  - -- --      Nasal Entry right:  - -- --      Scope serial number/identification [id number]  - -- --         Anatomy and Physiology    Velopharyngeal WFL  -SM -- --      Base of Tongue symmetrical;range adequate  -SM -- --      Epiglottis WFL  -SM -- --      Laryngeal Function Breathing symmetrical  -SM -- --      Laryngeal Function Phonation symmetrical  -SM -- --      Laryngeal Function to Breath Hold TVF/FVF/Arytenoid;sustained closure  -SM -- --      Secretion Rating Scale (Apollo et alJaya 1996) 0- normal, no visible secretions  -SM -- --      Secretion Description thin;clear  -SM -- --      Ice Chips elicited swallow;not aspirated  -SM -- --      Spontaneous Swallow frequency adequate  -SM -- --      Sensory sensed scope  -SM -- --      Utensils Used Spoon;Cup;Straw  -SM -- --      Consistencies Trialed ice chips;thin liquids;nectar-thick liquids;honey-thick liquids;pudding/puree;regular textures  -SM -- --         FEES Interpretation    Oral Phase prespill of liquids into pharynx  -SM -- --         Initiation of Pharyngeal Swallow     Initiation of Pharyngeal Swallow bolus in pyriform sinuses  -SM -- --      Pharyngeal Phase impaired pharyngeal phase of swallowing  -SM -- --      Penetration During the Swallow thin liquids;nectar-thick liquids;honey-thick liquids;secondary to delayed swallow initiation or mistiming;secondary to reduced laryngeal elevation;secondary to reduced vestibular closure  -SM -- --      Penetration After the Swallow nectar-thick liquids;honey-thick liquids;pudding/puree;secondary to residue;in valleculae;in pyriform sinuses  -SM -- --      Aspiration After the Swallow thin liquids;nectar-thick liquids;honey-thick liquids;secondary to residue;in laryngeal vestibule  -SM -- --      Depth of Penetration deep  -SM -- --      Response to Penetration No  -SM -- --      No spontaneous response to penetration and effective laryngeal clearance with cue (see comments)  cough/throat clear  -SM -- --      Response to Aspiration No  -SM -- --      No spontaneous response to aspiration with effective subglottic clearance with cue (see comments)  cough  -SM -- --      Rosenbek's Scale thin:;nectar:;honey:;8-->Level 8;pudding/puree:;3-->Level 3  -SM -- --      Residue all consistencies tested;diffuse within pharynx;secondary to reduced base of tongue retraction;secondary to reduced posterior pharyngeal wall stripping;secondary to reduced laryngeal elevation;secondary to reduced hyolaryngeal excursion;other (see comments)  diffuse mild residual coating  -SM -- --      Attempted Compensatory Maneuvers bolus size;bolus presentation style;throat clear after swallow;other (see comments)  given fatgue factor, did not further test  -SM -- --      Response to Attempted Compensatory Maneuvers did not prevent penetration;did not prevent aspiration  -SM -- --      Successful Compensatory Maneuver Competency patient able to;demonstrate compensations  -SM -- --      Pharyngeal Phase, Comment Initially no aspiration with tsp thin liquids through  puree/solids. Fatigue factor which lead to aspiration of thin through honey-thick liquids and deeper penetration with puree, with high risk of aspiration over course fo a meal.  - -- --         SLP Evaluation Clinical Impression    SLP Swallowing Diagnosis mild;oral dysphagia;mod-severe;pharyngeal dysphagia  - -- --      Functional Impact risk of aspiration/pneumonia  - -- --      Rehab Potential/Prognosis, Swallowing good, to achieve stated therapy goals  - -- --      Swallow Criteria for Skilled Therapeutic Interventions Met demonstrates skilled criteria  - -- --         SLP Treatment Clinical Impressions    Treatment Assessment (SLP) -- suspected;improved;pharyngeal dysphagia  - suspected;continued;pharyngeal dysphagia  -      Treatment Assessment Comments (SLP) -- -- Pt noted w/ hoarse vocal quality & decreased intensity. Reviewed/completed all exercises. Provided handout and encouraged independent practice. Pt w/ intermittent cough & wet vocal quality w/ thin liquid trials. Rec cont NPO w/ NG, ok for 3-4 ice chips/hr after oral care as tolerated. SLP will f/u & monitor for appropriateness for repeat FEES  -      Daily Summary of Progress (SLP) -- progress toward functional goals is good  - progress toward functional goals as expected  -      Plan for Continued Treatment (SLP) -- further assessment needed (see comments)  repeat FEES this PM  - continue treatment per plan of care  -      Care Plan Review -- evaluation/treatment results reviewed;patient/other agree to care plan  - care plan/treatment goals reviewed  -      Care Plan Review, Other Participant(s) -- -- family  -         Recommendations    Therapy Frequency (Swallow) 5 days per week  - -- 5 days per week  -      Predicted Duration Therapy Intervention (Days) 1 week  - -- 1 week  -      SLP Diet Recommendation NPO;temporary alternate methods of nutrition/hydration;other (see comments)  x 4oz puree snack, 3x/day,  as well as 6-8 ice chips or 1/4 tsp H2O/hour.  - -- NPO;temporary alternate methods of nutrition/hydration;other (see comments)  Ok for 3-4 ice chips/hr after oral care as tolerated  -      Recommended Diagnostics reassess via clinical swallow evaluation;other (see comments)  Will follow for tx and check back 1/26 for ? improved endurance and repeat FEES readiness.  - -- --      Recommended Precautions and Strategies general aspiration precautions;other (see comments)  to cough/clear throat a few times at end of puree snack  - -- general aspiration precautions  -      Oral Care Recommendations Oral Care BID/PRN;Toothbrush  - -- Oral Care BID/PRN;Suction toothbrush  -      SLP Rec. for Method of Medication Administration meds via alternate route  - -- meds via alternate route  -      Monitor for Signs of Aspiration yes;notify SLP if any concerns  - -- yes;notify SLP if any concerns  -      Anticipated Discharge Disposition (SLP) inpatient rehabilitation facility  - -- inpatient rehabilitation facility  -                User Key  (r) = Recorded By, (t) = Taken By, (c) = Cosigned By      Initials Name Effective Dates     Christina Pereyra, MS CCC-SLP 01/20/25 -      Kassy Whitehead, MS CCC-SLP 05/12/23 -                     EDUCATION  The patient has been educated in the following areas:   Dysphagia (Swallowing Impairment) Oral Care/Hydration NPO rationale Modified Diet Instruction.        SLP GOALS       Row Name 01/23/25 1330 01/23/25 0920 01/22/25 1625       (LTG) Patient will demonstrate progress toward functional swallow for    Diet Texture (Demonstrate progress toward functional swallow) regular textures  - regular textures  - regular textures  -    Liquid viscosity (Demonstrate progress toward functional swallow) thin liquids  - thin liquids  - thin liquids  -    [place] (Demonstrate progress towards functional swallow) with minimal cues (75-90% accuracy)  -  with minimal cues (75-90% accuracy)  -SM with minimal cues (75-90% accuracy)  -    Time Frame (Demonstrate progress toward functional swallow) 1 week  -SM 1 week  -SM 1 week  -    Progress/Outcomes (Demonstrate progress toward functional swallow) continuing progress toward goal  -SM good progress toward goal  -SM continuing progress toward goal  -MH       (STG) Patient will tolerate trials of    Consistencies Trialed (Tolerate trials) pureed textures;thin liquids  4oz puree snack, 3x/day, as well as 6-8 ice chips or 1/4 tsp H2O/hour.  -SM -- --    Desired Outcome (Tolerate trials) without signs/symptoms of aspiration;with use of compensatory strategies (see comments)  cough end of intake  -SM -- --    [place] (Tolerate trials) independently (over 90% accuracy)  -SM -- --    Time Frame (Tolerate trials) 1 week  -SM -- --    Progress/Outcomes (Tolerate trials) continuing progress toward goal  -SM -- --       (STG) Patient will tolerate therapeutic trials of    Consistencies Trialed (Tolerate therapeutic trials) pureed textures;thin liquids  -SM pureed textures;thin liquids  -SM pureed textures;thin liquids  -    Desired Outcome (Tolerate therapeutic trials) without signs/symptoms of aspiration  with no signs general fatigue  -SM without signs/symptoms of aspiration  -SM without signs/symptoms of aspiration  -    [place] (Tolerate therapeutic trials) with minimal cues (75-90% accuracy)  -SM with minimal cues (75-90% accuracy)  -SM with minimal cues (75-90% accuracy)  -    Time Frame (Tolerate therapeutic trials) 1 week  -SM 1 week  -SM 1 week  -    Progress/Outcomes (Tolerate therapeutic trials) goal ongoing  -SM good progress toward goal  -SM continuing progress toward goal  -    Comment (Tolerate therapeutic trials) -- No s/s aspiration with tsp H2O  -SM Intermittent cough & wet vocal quality w/ thins  -       (STG) Lingual Strengthening Goal 1 (SLP)    Activity (Lingual Strengthening Goal  1, SLP) increase lingual tone/sensation/control/coordination/movement;increase tongue back strength  -SM increase lingual tone/sensation/control/coordination/movement;increase tongue back strength  -SM increase lingual tone/sensation/control/coordination/movement;increase tongue back strength  -    Increase Lingual Tone/Sensation/Control/Coordination/Movement lingual resistance exercises;swallow trials  -SM lingual resistance exercises;swallow trials  -SM lingual resistance exercises;swallow trials  -    Increase Tongue Back Strength lingual resistance exercises  -SM lingual resistance exercises  -SM lingual resistance exercises  -    [place]/Accuracy (Lingual Strengthening Goal 1, SLP) with minimal cues (75-90% accuracy)  -SM with minimal cues (75-90% accuracy)  -SM with minimal cues (75-90% accuracy)  -    Time Frame (Lingual Strengthening Goal 1, SLP) 1 week  -SM 1 week  -SM 1 week  -    Progress/Outcomes (Lingual Strengthening Goal 1, SLP) continuing progress toward goal  -SM good progress toward goal  -SM continuing progress toward goal  -MH       (STG) Pharyngeal Strengthening Exercise Goal 1 (SLP)    Activity (Pharyngeal Strengthening Goal 1, SLP) increase timing;increase superior movement of the hyolaryngeal complex;increase anterior movement of the hyolaryngeal complex;increase closure at entrance to airway/closure of airway at glottis;increase squeeze/positive pressure generation  - increase timing;increase superior movement of the hyolaryngeal complex;increase anterior movement of the hyolaryngeal complex;increase closure at entrance to airway/closure of airway at glottis;increase squeeze/positive pressure generation  - increase timing;increase superior movement of the hyolaryngeal complex;increase anterior movement of the hyolaryngeal complex;increase closure at entrance to airway/closure of airway at glottis;increase squeeze/positive pressure generation  -    Increase Timing  prepping - 3 second prep or suck swallow or 3-step swallow  -SM prepping - 3 second prep or suck swallow or 3-step swallow  -SM prepping - 3 second prep or suck swallow or 3-step swallow  -    Increase Superior Movement of the Hyolaryngeal Complex effortful pitch glide (falsetto + pharyngeal squeeze)  -SM effortful pitch glide (falsetto + pharyngeal squeeze)  -SM effortful pitch glide (falsetto + pharyngeal squeeze)  -    Increase Anterior Movement of the Hyolaryngeal Complex chin tuck against resistance (CTAR)  -SM chin tuck against resistance (CTAR)  -SM chin tuck against resistance (CTAR)  -MH    Increase Closure at Entrance to Airway/Closure of Airway at Glottis supraglottic swallow  -SM supraglottic swallow  -SM supraglottic swallow  -    Increase Squeeze/Positive Pressure Generation hard effortful swallow  -SM hard effortful swallow  -SM hard effortful swallow  -    [place]/Accuracy (Pharyngeal Strengthening Goal 1, SLP) with minimal cues (75-90% accuracy)  -SM with minimal cues (75-90% accuracy)  -SM with minimal cues (75-90% accuracy)  -    Time Frame (Pharyngeal Strengthening Goal 1, SLP) 1 week  -SM 1 week  -SM 1 week  -    Progress/Outcomes (Pharyngeal Strengthening Goal 1, SLP) continuing progress toward goal  -SM good progress toward goal  -SM continuing progress toward goal  -    Comment (Pharyngeal Strengthening Goal 1, SLP) Focused on pt completing CTAR following puree snacks  - Pt has been completing exercises between sessions. Showing overall improvement in clinical strength and endurance.  - Reviewed/completed all exercises. Provided handout and encouraged independent practice  -              User Key  (r) = Recorded By, (t) = Taken By, (c) = Cosigned By      Initials Name Provider Type    Christina Calvillo, MS CCC-SLP Speech and Language Pathologist    Kassy Moe, MS CCC-SLP Speech and Language Pathologist                         Time Calculation:    Time  Calculation- SLP       Row Name 01/23/25 1518 01/23/25 1058          Time Calculation- SLP    SLP Start Time 1330  -SM 0920  -SM     SLP Received On 01/23/25  -SM 01/23/25  -        Untimed Charges    64295-BB Fiberoptic Endo Eval Swallow Minutes 75  -SM --     33262-WC Treatment Swallow Minutes 25  -SM 25  -SM        Total Minutes    Untimed Charges Total Minutes 100  -SM 25  -SM      Total Minutes 100  -SM 25  -SM               User Key  (r) = Recorded By, (t) = Taken By, (c) = Cosigned By      Initials Name Provider Type    Christina Calvillo, MS CCC-SLP Speech and Language Pathologist                    Therapy Charges for Today       Code Description Service Date Service Provider Modifiers Qty    29349538935 HC ST TREATMENT SWALLOW 2 1/23/2025 Christina Pereyra, MS CCC-SLP GN 1    [id number] HC ST FIBEROPTIC ENDO EVAL SWALL 5 1/23/2025 Christina Pereyra, MS CCC-SLP GN 1    [id number] HC ST TREATMENT SWALLOW 2 1/23/2025 Christina Pereyra, MS CCC-SLP GN 1                 Christina Pereyra, MS CCC-SLP  1/23/2025

## 2025-01-24 LAB
ALBUMIN SERPL-MCNC: 3.9 G/DL (ref 3.5–5.2)
ALBUMIN/GLOB SERPL: 0.9 G/DL
ALP SERPL-CCNC: 134 U/L (ref 39–117)
ALT SERPL W P-5'-P-CCNC: 35 U/L (ref 1–33)
ANION GAP SERPL CALCULATED.3IONS-SCNC: 14 MMOL/L (ref 5–15)
AST SERPL-CCNC: 70 U/L (ref 1–32)
BILIRUB SERPL-MCNC: 0.5 MG/DL (ref 0–1.2)
BUN SERPL-MCNC: 22 MG/DL (ref 6–20)
BUN/CREAT SERPL: 42.3 (ref 7–25)
CALCIUM SPEC-SCNC: 10.4 MG/DL (ref 8.6–10.5)
CHLORIDE SERPL-SCNC: 90 MMOL/L (ref 98–107)
CHOLEST SERPL-MCNC: 264 MG/DL (ref 0–200)
CO2 SERPL-SCNC: 28 MMOL/L (ref 22–29)
CREAT SERPL-MCNC: 0.52 MG/DL (ref 0.57–1)
CRP SERPL-MCNC: 2.18 MG/DL (ref 0–0.5)
EGFRCR SERPLBLD CKD-EPI 2021: 122.1 ML/MIN/1.73
GLOBULIN UR ELPH-MCNC: 4.2 GM/DL
GLUCOSE BLDC GLUCOMTR-MCNC: 234 MG/DL (ref 70–130)
GLUCOSE BLDC GLUCOMTR-MCNC: 239 MG/DL (ref 70–130)
GLUCOSE BLDC GLUCOMTR-MCNC: 242 MG/DL (ref 70–130)
GLUCOSE BLDC GLUCOMTR-MCNC: 256 MG/DL (ref 70–130)
GLUCOSE SERPL-MCNC: 231 MG/DL (ref 65–99)
MAGNESIUM SERPL-MCNC: 1.2 MG/DL (ref 1.6–2.6)
PHOSPHATE SERPL-MCNC: 5 MG/DL (ref 2.5–4.5)
POTASSIUM SERPL-SCNC: 4.5 MMOL/L (ref 3.5–5.2)
PROT SERPL-MCNC: 8.1 G/DL (ref 6–8.5)
SODIUM SERPL-SCNC: 132 MMOL/L (ref 136–145)
TRIGL SERPL-MCNC: 739 MG/DL (ref 0–150)

## 2025-01-24 PROCEDURE — 84100 ASSAY OF PHOSPHORUS: CPT | Performed by: INTERNAL MEDICINE

## 2025-01-24 PROCEDURE — 94799 UNLISTED PULMONARY SVC/PX: CPT

## 2025-01-24 PROCEDURE — 97110 THERAPEUTIC EXERCISES: CPT

## 2025-01-24 PROCEDURE — 63710000001 INSULIN REGULAR HUMAN PER 5 UNITS: Performed by: PEDIATRICS

## 2025-01-24 PROCEDURE — 94640 AIRWAY INHALATION TREATMENT: CPT

## 2025-01-24 PROCEDURE — 83735 ASSAY OF MAGNESIUM: CPT | Performed by: INTERNAL MEDICINE

## 2025-01-24 PROCEDURE — 97530 THERAPEUTIC ACTIVITIES: CPT

## 2025-01-24 PROCEDURE — 80053 COMPREHEN METABOLIC PANEL: CPT | Performed by: INTERNAL MEDICINE

## 2025-01-24 PROCEDURE — 99233 SBSQ HOSP IP/OBS HIGH 50: CPT | Performed by: PEDIATRICS

## 2025-01-24 PROCEDURE — 97610 LOW FREQUENCY NON-THERMAL US: CPT

## 2025-01-24 PROCEDURE — 25010000002 HYDROMORPHONE PER 4 MG: Performed by: PEDIATRICS

## 2025-01-24 PROCEDURE — 25010000002 MIDAZOLAM PER 1 MG: Performed by: INTERNAL MEDICINE

## 2025-01-24 PROCEDURE — 86140 C-REACTIVE PROTEIN: CPT | Performed by: INTERNAL MEDICINE

## 2025-01-24 PROCEDURE — 63710000001 INSULIN REGULAR HUMAN PER 5 UNITS: Performed by: INTERNAL MEDICINE

## 2025-01-24 PROCEDURE — 25010000002 METOCLOPRAMIDE PER 10 MG: Performed by: PEDIATRICS

## 2025-01-24 PROCEDURE — 25010000002 ENOXAPARIN PER 10 MG: Performed by: INTERNAL MEDICINE

## 2025-01-24 PROCEDURE — 92526 ORAL FUNCTION THERAPY: CPT | Performed by: SPEECH-LANGUAGE PATHOLOGIST

## 2025-01-24 PROCEDURE — 84478 ASSAY OF TRIGLYCERIDES: CPT | Performed by: INTERNAL MEDICINE

## 2025-01-24 PROCEDURE — 82465 ASSAY BLD/SERUM CHOLESTEROL: CPT | Performed by: INTERNAL MEDICINE

## 2025-01-24 PROCEDURE — 63710000001 INSULIN GLARGINE PER 5 UNITS: Performed by: PEDIATRICS

## 2025-01-24 PROCEDURE — 25010000002 MAGNESIUM SULFATE 2 GM/50ML SOLUTION: Performed by: PEDIATRICS

## 2025-01-24 PROCEDURE — 25010000002 BUMETANIDE PER 0.5 MG: Performed by: INTERNAL MEDICINE

## 2025-01-24 PROCEDURE — 97535 SELF CARE MNGMENT TRAINING: CPT

## 2025-01-24 PROCEDURE — 97597 DBRDMT OPN WND 1ST 20 CM/<: CPT

## 2025-01-24 PROCEDURE — 82948 REAGENT STRIP/BLOOD GLUCOSE: CPT

## 2025-01-24 RX ORDER — HYDROMORPHONE HYDROCHLORIDE 1 MG/ML
0.25 INJECTION, SOLUTION INTRAMUSCULAR; INTRAVENOUS; SUBCUTANEOUS
Status: DISCONTINUED | OUTPATIENT
Start: 2025-01-24 | End: 2025-01-25

## 2025-01-24 RX ORDER — LOPERAMIDE HYDROCHLORIDE 2 MG/1
2 CAPSULE ORAL 4 TIMES DAILY PRN
Status: DISCONTINUED | OUTPATIENT
Start: 2025-01-24 | End: 2025-01-27

## 2025-01-24 RX ORDER — MAGNESIUM SULFATE HEPTAHYDRATE 40 MG/ML
2 INJECTION, SOLUTION INTRAVENOUS
Status: COMPLETED | OUTPATIENT
Start: 2025-01-24 | End: 2025-01-25

## 2025-01-24 RX ADMIN — Medication 1 CAPSULE: at 09:50

## 2025-01-24 RX ADMIN — LOPERAMIDE HYDROCHLORIDE 2 MG: 2 CAPSULE ORAL at 13:19

## 2025-01-24 RX ADMIN — HYDROMORPHONE HYDROCHLORIDE 0.25 MG: 1 INJECTION, SOLUTION INTRAMUSCULAR; INTRAVENOUS; SUBCUTANEOUS at 15:16

## 2025-01-24 RX ADMIN — MIDAZOLAM HYDROCHLORIDE 2 MG: 1 INJECTION, SOLUTION INTRAMUSCULAR; INTRAVENOUS at 04:34

## 2025-01-24 RX ADMIN — INSULIN GLARGINE 35 UNITS: 100 INJECTION, SOLUTION SUBCUTANEOUS at 22:11

## 2025-01-24 RX ADMIN — INSULIN HUMAN 8 UNITS: 100 INJECTION, SOLUTION PARENTERAL at 00:09

## 2025-01-24 RX ADMIN — INSULIN HUMAN 12 UNITS: 100 INJECTION, SOLUTION PARENTERAL at 06:02

## 2025-01-24 RX ADMIN — METOCLOPRAMIDE HYDROCHLORIDE 10 MG: 10 INJECTION, SOLUTION INTRAMUSCULAR; INTRAVENOUS at 22:12

## 2025-01-24 RX ADMIN — INSULIN HUMAN 8 UNITS: 100 INJECTION, SOLUTION PARENTERAL at 17:27

## 2025-01-24 RX ADMIN — HYDROMORPHONE HYDROCHLORIDE 0.25 MG: 1 INJECTION, SOLUTION INTRAMUSCULAR; INTRAVENOUS; SUBCUTANEOUS at 20:00

## 2025-01-24 RX ADMIN — DULOXETINE HYDROCHLORIDE 30 MG: 30 CAPSULE, DELAYED RELEASE ORAL at 22:12

## 2025-01-24 RX ADMIN — ENOXAPARIN SODIUM 120 MG: 120 INJECTION SUBCUTANEOUS at 11:56

## 2025-01-24 RX ADMIN — NYSTATIN: 100000 POWDER TOPICAL at 09:52

## 2025-01-24 RX ADMIN — METOCLOPRAMIDE HYDROCHLORIDE 10 MG: 10 INJECTION, SOLUTION INTRAMUSCULAR; INTRAVENOUS at 09:56

## 2025-01-24 RX ADMIN — OXYCODONE HYDROCHLORIDE 5 MG: 5 SOLUTION ORAL at 10:14

## 2025-01-24 RX ADMIN — Medication 1 APPLICATION: at 09:51

## 2025-01-24 RX ADMIN — OXYCODONE HYDROCHLORIDE 5 MG: 5 SOLUTION ORAL at 04:26

## 2025-01-24 RX ADMIN — INSULIN GLARGINE 35 UNITS: 100 INJECTION, SOLUTION SUBCUTANEOUS at 09:50

## 2025-01-24 RX ADMIN — AMITRIPTYLINE HYDROCHLORIDE 25 MG: 25 TABLET, FILM COATED ORAL at 22:12

## 2025-01-24 RX ADMIN — GABAPENTIN 300 MG: 300 CAPSULE ORAL at 22:12

## 2025-01-24 RX ADMIN — GABAPENTIN 300 MG: 300 CAPSULE ORAL at 13:19

## 2025-01-24 RX ADMIN — IPRATROPIUM BROMIDE AND ALBUTEROL SULFATE 3 ML: 2.5; .5 SOLUTION RESPIRATORY (INHALATION) at 04:31

## 2025-01-24 RX ADMIN — ENOXAPARIN SODIUM 120 MG: 120 INJECTION SUBCUTANEOUS at 22:15

## 2025-01-24 RX ADMIN — Medication 10 ML: at 09:51

## 2025-01-24 RX ADMIN — INSULIN HUMAN 8 UNITS: 100 INJECTION, SOLUTION PARENTERAL at 11:56

## 2025-01-24 RX ADMIN — MAGNESIUM SULFATE HEPTAHYDRATE 2 G: 2 INJECTION, SOLUTION INTRAVENOUS at 22:13

## 2025-01-24 RX ADMIN — METOCLOPRAMIDE HYDROCHLORIDE 10 MG: 10 INJECTION, SOLUTION INTRAMUSCULAR; INTRAVENOUS at 04:26

## 2025-01-24 RX ADMIN — NYSTATIN: 100000 POWDER TOPICAL at 22:14

## 2025-01-24 RX ADMIN — HYDROMORPHONE HYDROCHLORIDE 0.25 MG: 1 INJECTION, SOLUTION INTRAMUSCULAR; INTRAVENOUS; SUBCUTANEOUS at 22:12

## 2025-01-24 RX ADMIN — INSULIN HUMAN 12 UNITS: 100 INJECTION, SOLUTION PARENTERAL at 17:26

## 2025-01-24 RX ADMIN — HYDROMORPHONE HYDROCHLORIDE 0.25 MG: 1 INJECTION, SOLUTION INTRAMUSCULAR; INTRAVENOUS; SUBCUTANEOUS at 17:26

## 2025-01-24 RX ADMIN — AMLODIPINE BESYLATE 10 MG: 10 TABLET ORAL at 09:50

## 2025-01-24 RX ADMIN — INSULIN HUMAN 12 UNITS: 100 INJECTION, SOLUTION PARENTERAL at 11:56

## 2025-01-24 RX ADMIN — BUMETANIDE 1 MG: 0.25 INJECTION INTRAMUSCULAR; INTRAVENOUS at 09:50

## 2025-01-24 RX ADMIN — ENOXAPARIN SODIUM 120 MG: 120 INJECTION SUBCUTANEOUS at 00:12

## 2025-01-24 RX ADMIN — Medication 1 APPLICATION: at 22:14

## 2025-01-24 RX ADMIN — INSULIN HUMAN 12 UNITS: 100 INJECTION, SOLUTION PARENTERAL at 00:08

## 2025-01-24 RX ADMIN — HYDROMORPHONE HYDROCHLORIDE 0.25 MG: 1 INJECTION, SOLUTION INTRAMUSCULAR; INTRAVENOUS; SUBCUTANEOUS at 13:19

## 2025-01-24 RX ADMIN — METOPROLOL SUCCINATE 50 MG: 50 TABLET, EXTENDED RELEASE ORAL at 22:12

## 2025-01-24 RX ADMIN — LOPERAMIDE HYDROCHLORIDE 2 MG: 2 CAPSULE ORAL at 22:12

## 2025-01-24 RX ADMIN — IPRATROPIUM BROMIDE AND ALBUTEROL SULFATE 3 ML: 2.5; .5 SOLUTION RESPIRATORY (INHALATION) at 14:40

## 2025-01-24 RX ADMIN — MAGNESIUM SULFATE HEPTAHYDRATE 2 G: 2 INJECTION, SOLUTION INTRAVENOUS at 18:47

## 2025-01-24 RX ADMIN — METOPROLOL SUCCINATE 50 MG: 50 TABLET, EXTENDED RELEASE ORAL at 09:50

## 2025-01-24 RX ADMIN — DULOXETINE HYDROCHLORIDE 60 MG: 60 CAPSULE, DELAYED RELEASE ORAL at 09:50

## 2025-01-24 RX ADMIN — GABAPENTIN 300 MG: 300 CAPSULE ORAL at 04:27

## 2025-01-24 RX ADMIN — METOCLOPRAMIDE HYDROCHLORIDE 10 MG: 10 INJECTION, SOLUTION INTRAMUSCULAR; INTRAVENOUS at 17:26

## 2025-01-24 NOTE — THERAPY TREATMENT NOTE
Acute Care - Speech Language Pathology   Swallow Treatment Note [facility]     Patient Name: Nataila Arzate  : 1985  MRN: [id number]  Today's Date: 2025               Admit Date: 2025    Visit Dx:     ICD-10-CM ICD-9-CM   1. Respiratory acidosis with metabolic acidosis  E87.4 276.3   2. Acute respiratory failure with hypoxia  J96.01 518.81   3. MARIAA (acute kidney injury)  N17.9 584.9   4. Pharyngeal dysphagia  R13.13 787.23     Patient Active Problem List   Diagnosis    Nephrolithiasis    Ovarian teratoma, right    Other chronic pain    Pelvic pain    History of DVT in adulthood    History of pulmonary embolism    Ureteral calculus    Ischemic cerebrovascular accident (CVA)    Primary hypertension    Left lumbar radiculopathy    PTSD (post-traumatic stress disorder)    MARIAA (acute kidney injury)    Anemia    Dyslipidemia    Hypothyroid    Other secondary gout, multiple sites    Factor 5 Leiden mutation, heterozygous    Vitamin D deficiency    Vitamin B 12 deficiency    Type 2 diabetes mellitus with hyperglycemia    Hoarseness, persistent    Ureterolithiasis    Acute cystitis without hematuria    Hepatic steatosis    Right flank pain, chronic    Detrusor instability of bladder    Frequency of micturition    Acute respiratory failure with hypercapnia    Hyperkalemia    Sepsis    Respiratory acidosis with metabolic acidosis    Acute respiratory failure    Diabetes mellitus type 2, insulin dependent    Diabetic peripheral neuropathy associated with type 2 diabetes mellitus     Past Medical History:   Diagnosis Date    A-fib     Abnormal ECG     Anemia     Anxiety     Asthma     Cancer     Ovarian    Depression     Diabetes mellitus     DVT (deep venous thrombosis)     Factor 5 Leiden mutation, heterozygous     Fibroid     GERD (gastroesophageal reflux disease)     Gout     H/O abdominal abscess     History of sepsis     History of transfusion     Hyperlipidemia     Hypothyroid     Kidney stone      Migraines     Neuropathy     Ovarian cancer 2021    Ovarian cyst     PE (pulmonary embolism)     Polycystic ovary syndrome     Preeclampsia     Rh incompatibility     Stroke     TIA (transient ischemic attack)     Urinary tract infection     Varicella      Past Surgical History:   Procedure Laterality Date    ABDOMINAL SURGERY      CARDIAC CATHETERIZATION       SECTION      CHOLECYSTECTOMY      COLONOSCOPY      ENDOSCOPY      EXTRACORPOREAL SHOCK WAVE LITHOTRIPSY (ESWL) Left 10/22/2021    Procedure: EXTRACORPOREAL SHOCKWAVE LITHOTRIPSY;  Surgeon: Milan Motley MD;  Location: [facility] OR;  Service: Urology;  Laterality: Left;    HYSTERECTOMY  2017    ovarian ca    INSERT CENTRAL LINE AT BEDSIDE  2025    LITHOTRIPSY      NEPHRECTOMY Left 10/2022    RIGHT OOPHORECTOMY      URETEROSCOPY LASER LITHOTRIPSY WITH STENT INSERTION Left 10/01/2021    Procedure: URETEROSCOPY WITH STENT PLACEMENT;  Surgeon: Milan Motley MD;  Location: [facility] OR;  Service: Urology;  Laterality: Left;    URETEROSCOPY LASER LITHOTRIPSY WITH STENT INSERTION Left 2022    Procedure: URETEROSCOPY STENT REMOVAL;  Surgeon: Milan Motley MD;  Location: [facility] OR;  Service: Urology;  Laterality: Left;    URETEROSCOPY LASER LITHOTRIPSY WITH STENT INSERTION Left 2022    Procedure: CYSTOSCOPY RETROGRADE LEFT WITH STENT PLACEMENT;  Surgeon: Milan Motley MD;  Location: [facility] OR;  Service: Urology;  Laterality: Left;       SLP Recommendation and Plan     SLP Diet Recommendation: NPO, temporary alternate methods of nutrition/hydration, other (see comments) (4oz puree snack, 3x/day, as well as 6-8 ice chips or 1/4 tsp H2O/hour.) (25 1000)  Recommended Precautions and Strategies: general aspiration precautions, other (see comments) (to cough/clear throat a few times at end of puree snack) (25 1000)  SLP Rec. for Method of Medication Administration: meds via alternate route (25  1000)     Monitor for Signs of Aspiration: yes, notify SLP if any concerns (01/24/25 1000)  Recommended Diagnostics: reassess via clinical swallow evaluation, other (see comments) (will f/u for re-eval on 1/27) (01/24/25 1000)     Anticipated Discharge Disposition (SLP): inpatient rehabilitation facility (01/24/25 1000)     Therapy Frequency (Swallow): 5 days per week (01/24/25 1000)  Predicted Duration Therapy Intervention (Days): 1 week (01/24/25 1000)  Oral Care Recommendations: Oral Care BID/PRN, Toothbrush (01/24/25 1000)        Daily Summary of Progress (SLP): progress toward functional goals as expected (01/24/25 1000)               Treatment Assessment (SLP): suspected, continued, pharyngeal dysphagia (01/24/25 1000)  Treatment Assessment Comments (SLP): Pt continues w/ hoarse vocal quality and hard cough w/ trials of ice chips/thins this date. Reviewed and completed all dysphagia tx exercises. Will plan to f/u for canidacy for repeat study on 1/27 (01/24/25 1000)  Plan for Continued Treatment (SLP): continue treatment per plan of care (01/24/25 1000)         Progress: no change      SWALLOW EVALUATION (Last 72 Hours)       SLP Adult Swallow Evaluation       Row Name 01/24/25 1000 01/23/25 1330 01/23/25 0920 01/22/25 1625          Rehab Evaluation    Document Type therapy note (daily note)  -CJ re-evaluation  - therapy note (daily note)  - therapy note (daily note)  -     Subjective Information no complaints  - no complaints  - no complaints  - no complaints  -     Patient Observations alert;cooperative;agree to therapy  -CJ alert;cooperative  -SM alert;cooperative  - alert;cooperative  -     Patient/Family/Caregiver Comments/Observations family present-sleeping  -CJ -- -- family present  -     Patient Effort good  -CJ -- good  -SM good  -     Symptoms Noted During/After Treatment none  - -- -- none  -        General Information    Current Method of Nutrition -- NPO;nasogastric  feedings;small-bore  - -- --     Plans/Goals Discussed with -- patient;agreed upon  - -- --     Barriers to Rehab -- none identified  - -- --     Patient's Goals for Discharge -- return to PO diet  - -- --        Pain    Pretreatment Pain Rating -- 0/10 - no pain  - -- --     Posttreatment Pain Rating -- 0/10 - no pain  - -- --     Additional Documentation Pain Scale: FACES Pre/Post-Treatment (Group)  - -- -- Pain Scale: FACES Pre/Post-Treatment (Group)  [facility]        Pain Scale: FACES Pre/Post-Treatment    Pain: FACES Scale, Pretreatment 2-->hurts little bit  - -- 2-->hurts little bit  - 0-->no hurt  -     Posttreatment Pain Rating 2-->hurts little bit  - -- 2-->hurts little bit  - 0-->no hurt  -     Pre/Posttreatment Pain Comment -- -- RN in room and managing  - --        Fiberoptic Endoscopic Evaluation of Swallowing (FEES)    Risks/Benefits Reviewed -- risks/benefits explained;patient;agreed to eval  - -- --     Nasal Entry -- right:  - -- --     Scope serial number/identification -- 338  - -- --        Anatomy and Physiology    Velopharyngeal -- WFL  - -- --     Base of Tongue -- symmetrical;range adequate  - -- --     Epiglottis -- WFL  - -- --     Laryngeal Function Breathing -- symmetrical  - -- --     Laryngeal Function Phonation -- symmetrical  - -- --     Laryngeal Function to Breath Hold -- TVF/FVF/Arytenoid;sustained closure  - -- --     Secretion Rating Scale (Apollo et al. 1996) -- 0- normal, no visible secretions  - -- --     Secretion Description -- thin;clear  - -- --     Ice Chips -- elicited swallow;not aspirated  - -- --     Spontaneous Swallow -- frequency adequate  - -- --     Sensory -- sensed scope  - -- --     Utensils Used -- Spoon;Cup;Straw  - -- --     Consistencies Trialed -- ice chips;thin liquids;nectar-thick liquids;honey-thick liquids;pudding/puree;regular textures  - -- --        FEES Interpretation    Oral Phase -- prespill  of liquids into pharynx  -SM -- --        Initiation of Pharyngeal Swallow    Initiation of Pharyngeal Swallow -- bolus in pyriform sinuses  -SM -- --     Pharyngeal Phase -- impaired pharyngeal phase of swallowing  -SM -- --     Penetration During the Swallow -- thin liquids;nectar-thick liquids;honey-thick liquids;secondary to delayed swallow initiation or mistiming;secondary to reduced laryngeal elevation;secondary to reduced vestibular closure  -SM -- --     Penetration After the Swallow -- nectar-thick liquids;honey-thick liquids;pudding/puree;secondary to residue;in valleculae;in pyriform sinuses  -SM -- --     Aspiration After the Swallow -- thin liquids;nectar-thick liquids;honey-thick liquids;secondary to residue;in laryngeal vestibule  -SM -- --     Depth of Penetration -- deep  -SM -- --     Response to Penetration -- No  -SM -- --     No spontaneous response to penetration and -- effective laryngeal clearance with cue (see comments)  cough/throat clear  -SM -- --     Response to Aspiration -- No  -SM -- --     No spontaneous response to aspiration with -- effective subglottic clearance with cue (see comments)  cough  -SM -- --     Rosenbek's Scale -- thin:;nectar:;honey:;8-->Level 8;pudding/puree:;3-->Level 3  -SM -- --     Residue -- all consistencies tested;diffuse within pharynx;secondary to reduced base of tongue retraction;secondary to reduced posterior pharyngeal wall stripping;secondary to reduced laryngeal elevation;secondary to reduced hyolaryngeal excursion;other (see comments)  diffuse mild residual coating  -SM -- --     Attempted Compensatory Maneuvers -- bolus size;bolus presentation style;throat clear after swallow;other (see comments)  given fatgue factor, did not further test  -SM -- --     Response to Attempted Compensatory Maneuvers -- did not prevent penetration;did not prevent aspiration  -SM -- --     Successful Compensatory Maneuver Competency -- patient able to;demonstrate  compensations  - -- --     Pharyngeal Phase, Comment -- Initially no aspiration with tsp thin liquids through puree/solids. Fatigue factor which lead to aspiration of thin through honey-thick liquids and deeper penetration with puree, with high risk of aspiration over course fo a meal.  - -- --        SLP Evaluation Clinical Impression    SLP Swallowing Diagnosis -- mild;oral dysphagia;mod-severe;pharyngeal dysphagia  - -- --     Functional Impact -- risk of aspiration/pneumonia  - -- --     Rehab Potential/Prognosis, Swallowing -- good, to achieve stated therapy goals  - -- --     Swallow Criteria for Skilled Therapeutic Interventions Met -- demonstrates skilled criteria  - -- --        SLP Treatment Clinical Impressions    Treatment Assessment (SLP) suspected;continued;pharyngeal dysphagia  - -- suspected;improved;pharyngeal dysphagia  - suspected;continued;pharyngeal dysphagia  -     Treatment Assessment Comments (SLP) Pt continues w/ hoarse vocal quality and hard cough w/ trials of ice chips/thins this date. Reviewed and completed all dysphagia tx exercises. Will plan to f/u for canidacy for repeat study on 1/27  - -- -- Pt noted w/ hoarse vocal quality & decreased intensity. Reviewed/completed all exercises. Provided handout and encouraged independent practice. Pt w/ intermittent cough & wet vocal quality w/ thin liquid trials. Rec cont NPO w/ NG, ok for 3-4 ice chips/hr after oral care as tolerated. SLP will f/u & monitor for appropriateness for repeat FEES  [facility]     Daily Summary of Progress (SLP) progress toward functional goals as expected  - -- progress toward functional goals is good  - progress toward functional goals as expected  [facility]     Plan for Continued Treatment (SLP) continue treatment per plan of care  - -- further assessment needed (see comments)  repeat FEES this PM  - continue treatment per plan of care  -     Care Plan Review care plan/treatment goals reviewed  -  -- evaluation/treatment results reviewed;patient/other agree to care plan  - care plan/treatment goals reviewed  -     Care Plan Review, Other Participant(s) -- -- -- family  -        Recommendations    Therapy Frequency (Swallow) 5 days per week  - 5 days per week  - -- 5 days per week  -     Predicted Duration Therapy Intervention (Days) 1 week  - 1 week  - -- 1 week  -     SLP Diet Recommendation NPO;temporary alternate methods of nutrition/hydration;other (see comments)  4oz puree snack, 3x/day, as well as 6-8 ice chips or 1/4 tsp H2O/hour.  - NPO;temporary alternate methods of nutrition/hydration;other (see comments)  x 4oz puree snack, 3x/day, as well as 6-8 ice chips or 1/4 tsp H2O/hour.  - -- NPO;temporary alternate methods of nutrition/hydration;other (see comments)  Ok for 3-4 ice chips/hr after oral care as tolerated  -     Recommended Diagnostics reassess via clinical swallow evaluation;other (see comments)  will f/u for re-eval on 1/27  - reassess via clinical swallow evaluation;other (see comments)  Will follow for tx and check back 1/26 for ? improved endurance and repeat FEES readiness.  - -- --     Recommended Precautions and Strategies general aspiration precautions;other (see comments)  to cough/clear throat a few times at end of puree snack  - general aspiration precautions;other (see comments)  to cough/clear throat a few times at end of puree snack  - -- general aspiration precautions  -     Oral Care Recommendations Oral Care BID/PRN;Toothbrush  - Oral Care BID/PRN;Toothbrush  - -- Oral Care BID/PRN;Suction toothbrush  -     SLP Rec. for Method of Medication Administration meds via alternate route  - meds via alternate route  - -- meds via alternate route  -     Monitor for Signs of Aspiration yes;notify SLP if any concerns  - yes;notify SLP if any concerns  - -- yes;notify SLP if any concerns  -     Anticipated Discharge Disposition (SLP)  inpatient rehabilitation facility  -CJ inpatient rehabilitation facility  - -- inpatient rehabilitation facility  -               User Key  (r) = Recorded By, (t) = Taken By, (c) = Cosigned By      Initials Name Effective Dates    RASHEEDA Pranay Pereyratia SNYDER, MS CCC-SLP 01/20/25 -     Chelo Chairez, MS CCC-SLP 01/21/25 -      Kassy Whitehead, MS CCC-SLP 05/12/23 -                     EDUCATION  The patient has been educated in the following areas:   Dysphagia (Swallowing Impairment) Oral Care/Hydration.        SLP GOALS       Row Name 01/24/25 1000 01/23/25 1330 01/23/25 0920       (LTG) Patient will demonstrate progress toward functional swallow for    Diet Texture (Demonstrate progress toward functional swallow) regular textures  -CJ regular textures  -SM regular textures  -SM    Liquid viscosity (Demonstrate progress toward functional swallow) thin liquids  -CJ thin liquids  -SM thin liquids  -SM    [place] (Demonstrate progress towards functional swallow) with minimal cues (75-90% accuracy)  -CJ with minimal cues (75-90% accuracy)  -SM with minimal cues (75-90% accuracy)  -SM    Time Frame (Demonstrate progress toward functional swallow) 1 week  -CJ 1 week  -SM 1 week  -SM    Progress/Outcomes (Demonstrate progress toward functional swallow) continuing progress toward goal  -CJ continuing progress toward goal  -SM good progress toward goal  -SM       (STG) Patient will tolerate trials of    Consistencies Trialed (Tolerate trials) pureed textures;thin liquids  3x/day, as well as 6-8 ice chips or 1/4 tsp H2O/hour.  -CJ pureed textures;thin liquids  4oz puree snack, 3x/day, as well as 6-8 ice chips or 1/4 tsp H2O/hour.  -SM --    Desired Outcome (Tolerate trials) without signs/symptoms of aspiration;with use of compensatory strategies (see comments)  -CJ without signs/symptoms of aspiration;with use of compensatory strategies (see comments)  cough end of intake  -SM --    [place]ake (Tolerate  trials) independently (over 90% accuracy)  -CJ independently (over 90% accuracy)  -SM --    Time Frame (Tolerate trials) 1 week  -CJ 1 week  -SM --    Progress/Outcomes (Tolerate trials) continuing progress toward goal  -CJ continuing progress toward goal  -SM --       (STG) Patient will tolerate therapeutic trials of    Consistencies Trialed (Tolerate therapeutic trials) pureed textures;thin liquids  -CJ pureed textures;thin liquids  -SM pureed textures;thin liquids  -SM    Desired Outcome (Tolerate therapeutic trials) without signs/symptoms of aspiration  with no signs general fatigue  -CJ without signs/symptoms of aspiration  with no signs general fatigue  -SM without signs/symptoms of aspiration  -SM    [place] (Tolerate therapeutic trials) with minimal cues (75-90% accuracy)  -CJ with minimal cues (75-90% accuracy)  -SM with minimal cues (75-90% accuracy)  -SM    Time Frame (Tolerate therapeutic trials) 1 week  -CJ 1 week  -SM 1 week  -SM    Progress/Outcomes (Tolerate therapeutic trials) continuing progress toward goal  -CJ goal ongoing  -SM good progress toward goal  -SM    Comment (Tolerate therapeutic trials) hard cough w/ trials of thins  -CJ -- No s/s aspiration with tsp H2O  -SM       (STG) Lingual Strengthening Goal 1 (SLP)    Activity (Lingual Strengthening Goal 1, SLP) increase lingual tone/sensation/control/coordination/movement;increase tongue back strength  -CJ increase lingual tone/sensation/control/coordination/movement;increase tongue back strength  -SM increase lingual tone/sensation/control/coordination/movement;increase tongue back strength  -SM    Increase Lingual Tone/Sensation/Control/Coordination/Movement lingual resistance exercises;swallow trials  -CJ lingual resistance exercises;swallow trials  -SM lingual resistance exercises;swallow trials  -SM    Increase Tongue Back Strength lingual resistance exercises  -CJ lingual resistance exercises  -SM lingual resistance exercises  -SM     [place]/Accuracy (Lingual Strengthening Goal 1, SLP) with minimal cues (75-90% accuracy)  -CJ with minimal cues (75-90% accuracy)  -SM with minimal cues (75-90% accuracy)  -SM    Time Frame (Lingual Strengthening Goal 1, SLP) 1 week  -CJ 1 week  -SM 1 week  -SM    Progress/Outcomes (Lingual Strengthening Goal 1, SLP) continuing progress toward goal  -CJ continuing progress toward goal  -SM good progress toward goal  -SM       (STG) Pharyngeal Strengthening Exercise Goal 1 (SLP)    Activity (Pharyngeal Strengthening Goal 1, SLP) increase timing;increase superior movement of the hyolaryngeal complex;increase anterior movement of the hyolaryngeal complex;increase closure at entrance to airway/closure of airway at glottis;increase squeeze/positive pressure generation  -CJ increase timing;increase superior movement of the hyolaryngeal complex;increase anterior movement of the hyolaryngeal complex;increase closure at entrance to airway/closure of airway at glottis;increase squeeze/positive pressure generation  -SM increase timing;increase superior movement of the hyolaryngeal complex;increase anterior movement of the hyolaryngeal complex;increase closure at entrance to airway/closure of airway at glottis;increase squeeze/positive pressure generation  -SM    Increase Timing prepping - 3 second prep or suck swallow or 3-step swallow  -CJ prepping - 3 second prep or suck swallow or 3-step swallow  -SM prepping - 3 second prep or suck swallow or 3-step swallow  -SM    Increase Superior Movement of the Hyolaryngeal Complex effortful pitch glide (falsetto + pharyngeal squeeze)  -CJ effortful pitch glide (falsetto + pharyngeal squeeze)  -SM effortful pitch glide (falsetto + pharyngeal squeeze)  -SM    Increase Anterior Movement of the Hyolaryngeal Complex chin tuck against resistance (CTAR)  -CJ chin tuck against resistance (CTAR)  -SM chin tuck against resistance (CTAR)  -SM    Increase Closure at Entrance to  Airway/Closure of Airway at Glottis supraglottic swallow  -CJ supraglottic swallow  -SM supraglottic swallow  -SM    Increase Squeeze/Positive Pressure Generation hard effortful swallow  -CJ hard effortful swallow  -SM hard effortful swallow  -SM    [place]/Accuracy (Pharyngeal Strengthening Goal 1, SLP) with minimal cues (75-90% accuracy)  -CJ with minimal cues (75-90% accuracy)  -SM with minimal cues (75-90% accuracy)  -SM    Time Frame (Pharyngeal Strengthening Goal 1, SLP) 1 week  -CJ 1 week  -SM 1 week  -SM    Progress/Outcomes (Pharyngeal Strengthening Goal 1, SLP) continuing progress toward goal  -CJ continuing progress toward goal  -SM good progress toward goal  -SM    Comment (Pharyngeal Strengthening Goal 1, SLP) reviewed and completed  -CJ Focused on pt completing CTAR following puree snacks  - Pt has been completing exercises between sessions. Showing overall improvement in clinical strength and endurance.  -      Row Name 01/22/25 1625             (LTG) Patient will demonstrate progress toward functional swallow for    Diet Texture (Demonstrate progress toward functional swallow) regular textures  -      Liquid viscosity (Demonstrate progress toward functional swallow) thin liquids  -      [place] (Demonstrate progress towards functional swallow) with minimal cues (75-90% accuracy)  -      Time Frame (Demonstrate progress toward functional swallow) 1 week  -      Progress/Outcomes (Demonstrate progress toward functional swallow) continuing progress toward goal  -         (STG) Patient will tolerate therapeutic trials of    Consistencies Trialed (Tolerate therapeutic trials) pureed textures;thin liquids  -      Desired Outcome (Tolerate therapeutic trials) without signs/symptoms of aspiration  -      [place] (Tolerate therapeutic trials) with minimal cues (75-90% accuracy)  -      Time Frame (Tolerate therapeutic trials) 1 week  -      Progress/Outcomes (Tolerate  therapeutic trials) continuing progress toward goal  -      Comment (Tolerate therapeutic trials) Intermittent cough & wet vocal quality w/ thins  -         (STG) Lingual Strengthening Goal 1 (SLP)    Activity (Lingual Strengthening Goal 1, SLP) increase lingual tone/sensation/control/coordination/movement;increase tongue back strength  -      Increase Lingual Tone/Sensation/Control/Coordination/Movement lingual resistance exercises;swallow trials  -      Increase Tongue Back Strength lingual resistance exercises  -      [place]/Accuracy (Lingual Strengthening Goal 1, SLP) with minimal cues (75-90% accuracy)  -      Time Frame (Lingual Strengthening Goal 1, SLP) 1 week  -      Progress/Outcomes (Lingual Strengthening Goal 1, SLP) continuing progress toward goal  -         (STG) Pharyngeal Strengthening Exercise Goal 1 (SLP)    Activity (Pharyngeal Strengthening Goal 1, SLP) increase timing;increase superior movement of the hyolaryngeal complex;increase anterior movement of the hyolaryngeal complex;increase closure at entrance to airway/closure of airway at glottis;increase squeeze/positive pressure generation  -      Increase Timing prepping - 3 second prep or suck swallow or 3-step swallow  -      Increase Superior Movement of the Hyolaryngeal Complex effortful pitch glide (falsetto + pharyngeal squeeze)  -      Increase Anterior Movement of the Hyolaryngeal Complex chin tuck against resistance (CTAR)  -      Increase Closure at Entrance to Airway/Closure of Airway at Glottis supraglottic swallow  -      Increase Squeeze/Positive Pressure Generation hard effortful swallow  -      [place]/Accuracy (Pharyngeal Strengthening Goal 1, SLP) with minimal cues (75-90% accuracy)  -      Time Frame (Pharyngeal Strengthening Goal 1, SLP) 1 week  -      Progress/Outcomes (Pharyngeal Strengthening Goal 1, SLP) continuing progress toward goal  -      Comment (Pharyngeal Strengthening  Goal 1, SLP) Reviewed/completed all exercises. Provided handout and encouraged independent practice  -                User Key  (r) = Recorded By, (t) = Taken By, (c) = Cosigned By      Initials Name Provider Type    Christina Calvillo, MS CCC-SLP Speech and Language Pathologist    Chelo Chairez, MS CCC-SLP Speech and Language Pathologist    Kassy Moe, MS CCC-SLP Speech and Language Pathologist                         Time Calculation:    Time Calculation- SLP       Row Name 01/24/25 1147             Time Calculation- SLP    SLP Start Time 1000  -CJ      SLP Received On 01/24/25  -         Untimed Charges    08950-KA Treatment Swallow Minutes 39  -CJ         Total Minutes    Untimed Charges Total Minutes 39  -CJ       Total Minutes 39  -CJ                User Key  (r) = Recorded By, (t) = Taken By, (c) = Cosigned By      Initials Name Provider Type    Chelo Chairez, MS CCC-SLP Speech and Language Pathologist                    Therapy Charges for Today       Code Description Service Date Service Provider Modifiers Qty    [id number]  ST TREATMENT SWALLOW 3 1/24/2025 Chelo Pabon MS CCC-SLP GN 1                 Chelo Pabon MS CCC-CHIVO  1/24/2025

## 2025-01-24 NOTE — PLAN OF CARE
Goal Outcome Evaluation:  Plan of Care Reviewed With: patient        Progress: no change       Anticipated Discharge Disposition (SLP): inpatient rehabilitation facility             Treatment Assessment (SLP): suspected, continued, pharyngeal dysphagia (01/24/25 1000)  Treatment Assessment Comments (SLP): Pt continues w/ hoarse vocal quality and hard cough w/ trials of ice chips/thins this date. Reviewed and completed all dysphagia tx exercises. Will plan to f/u for canidacy for repeat study on 1/27 (01/24/25 1000)  Plan for Continued Treatment (SLP): continue treatment per plan of care (01/24/25 1000)

## 2025-01-24 NOTE — PAYOR COMM NOTE
"Mago Arzate \"Isa\" (39 y.o. Female)       Date of Birth   1985    Social Security Number       Address   [address] [facility] [address] [facility] [address]    Home Phone   [phone]    MRN   [id number]       [facility]    Marital Status                               Admission Date   1/2/25    Admission Type   Urgent    Admitting Provider   Diana Ghotra MD    Attending Provider   Diana Ghotra MD    Department, Room/Bed   [facility] 6A, N620/1       Discharge Date       Discharge Disposition       Discharge Destination                                 Attending Provider: Diana Ghotra MD    Allergies: Salvia Officinalis, Shellfish Allergy, Toradol [Ketorolac Tromethamine], Tree Nut, Haldol [Haloperidol], Tramadol, Amoxicillin, Penicillins    Isolation: Contact   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), 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)   Code Status: CPR    Ht: 162.6 cm (64.02\")   Wt: 171 kg (377 lb 12.8 oz)    Admission Cmt: None   Principal Problem: Acute respiratory failure with hypercapnia [J96.02]                   Active Insurance as of 1/2/2025       Primary Coverage       Payor Plan Insurance Group Employer/Plan Group    [place]        Payor Plan Address Payor Plan Phone Number Payor Plan Fax Number Effective Dates    [address]   4/1/2016 - None Entered    [facility] [id number]         Subscriber Name Subscriber Birth Date Member ID       MAGO ARZATE 1985 [id number]                     Emergency Contacts        (Rel.) Home Phone Work Phone Mobile Phone    Faina Arzate (Mother) [phone] -- --    Milan Arzate (Son) [phone] -- [phone]              [place]: [facility] [id number] Tax ID [id number]  Insurance Information                  [place] Phone: --    Subscriber: Mago Arzate Subscriber#: [id number]    Group#: -- " Precert#: --    Authorization#: [id number] Effective Date: --          Current Facility-Administered Medications   Medication Dose Route Frequency Provider Last Rate Last Admin    acetaminophen (TYLENOL) 160 MG/5ML oral solution 650 mg  650 mg Nasogastric Q6H PRN Case, Tiffanie V., DO   650 mg at 01/22/25 1804    amitriptyline (ELAVIL) tablet 25 mg  25 mg Nasogastric Nightly Diana Ghotra MD   25 mg at 01/23/25 2100    amLODIPine (NORVASC) tablet 10 mg  10 mg Nasogastric Daily Diana Ghotra MD        bumetanide (BUMEX) injection 1 mg  1 mg Intravenous Daily Case, Tiffanie V., DO   1 mg at 01/23/25 1007    castor oil-balsam peru (VENELEX) ointment 1 Application  1 Application Topical Q12H Case, Tiffanie V., DO   1 Application at 01/23/25 1010    dextrose (D50W) (25 g/50 mL) IV injection 25 g  25 g Intravenous Q15 Min PRN Case, Tiffanie V., DO        DULoxetine (CYMBALTA) DR capsule 30 mg  30 mg Oral Nightly Diana Ghotra MD   30 mg at 01/23/25 2100    DULoxetine (CYMBALTA) DR capsule 60 mg  60 mg Oral Daily Diana Ghotra MD   60 mg at 01/23/25 1007    Enoxaparin Sodium (LOVENOX) syringe 120 mg  120 mg Subcutaneous Q12H Case, Tiffanie V., DO   120 mg at 01/24/25 0012    gabapentin (NEURONTIN) capsule 300 mg  300 mg Nasogastric Q8H Case, Tiffanie V., DO   300 mg at 01/24/25 0427    glucagon (GLUCAGEN) injection 1 mg  1 mg Intramuscular Q15 Min PRN Case, Tiffanie V., DO        HYDROcodone-acetaminophen (NORCO) 5-325 MG per tablet 1 tablet  1 tablet Nasogastric Q4H PRN Diana Ghotra MD   1 tablet at 01/23/25 1312    insulin glargine (LANTUS, SEMGLEE) injection 35 Units  35 Units Subcutaneous Q12H Diana Ghotra MD   35 Units at 01/23/25 2102    insulin regular (humuLIN R,novoLIN R) injection 12 Units  12 Units Subcutaneous Q6H Diana Ghotra MD   12 Units at 01/24/25 0602    insulin regular (humuLIN R,novoLIN R) injection 4-24 Units  4-24 Units Subcutaneous  Q6H Case, Tiffanie V., DO   12 Units at 01/24/25 0602    ipratropium (ATROVENT) nebulizer solution 0.5 mg  0.5 mg Nebulization Q4H PRN Case, Tiffanie V., DO        ipratropium-albuterol (DUO-NEB) nebulizer solution 3 mL  3 mL Nebulization Q4H PRN Case, Tiffanie V., DO   3 mL at 01/24/25 0431    lactobacillus acidophilus (RISAQUAD) capsule 1 capsule  1 capsule Nasogastric Daily Diana Ghotra MD        Magnesium Standard Dose Replacement - Follow Nurse / BPA Driven Protocol   Not Applicable PRN Case, Tiffanie V., DO        meclizine (ANTIVERT) tablet 25 mg  25 mg Nasogastric TID PRN Diana Ghotra MD   25 mg at 01/22/25 1251    metoclopramide (REGLAN) injection 10 mg  10 mg Intravenous Q6H Diana Ghotra MD   10 mg at 01/24/25 0426    metoprolol succinate XL (TOPROL-XL) 24 hr tablet 50 mg  50 mg Oral BID Diana Ghotra MD   50 mg at 01/23/25 2100    midazolam (VERSED) injection 2 mg  2 mg Intravenous Q1H PRN Case, Tiffanie V., DO   2 mg at 01/24/25 0434    nystatin (MYCOSTATIN) powder   Topical Q12H Case, Tiffanie V., DO   Given at 01/23/25 2102    ondansetron (ZOFRAN) injection 4 mg  4 mg Intravenous Q6H PRN Case, Tiffanie V., DO   4 mg at 01/20/25 1808    oxyCODONE (ROXICODONE) 5 MG/5ML solution 5 mg  5 mg Oral Q4H PRN Diana Ghotra MD   5 mg at 01/24/25 0426    Polyvinyl Alcohol-Povidone PF (ARTIFICIAL TEARS) 1.4-0.6 % ophthalmic solution 2 drop  2 drop Both Eyes Q2H PRN Case, Tiffanie V., DO        Potassium Replacement - Follow Nurse / BPA Driven Protocol   Not Applicable PRN Case, Tiffanie V., DO        sodium chloride 0.9 % flush 10 mL  10 mL Intravenous PRN Case, Tiffanie V., DO   10 mL at 01/21/25 0800    sodium chloride 0.9 % flush 20 mL  20 mL Intravenous PRN Case, Tiffanie V., DO         Lab Results (last 24 hours)       Procedure Component Value Units Date/Time    POC Glucose Once [[id number]]  (Abnormal) Collected: 01/24/25 0557    Specimen: Blood Updated: 01/24/25 0559      Glucose 256 mg/dL     POC Glucose Once [[id number]]  (Abnormal) Collected: 01/24/25 0004    Specimen: Blood Updated: 01/24/25 0006     Glucose 234 mg/dL     POC Glucose Once [[id number]]  (Abnormal) Collected: 01/23/25 1953    Specimen: Blood Updated: 01/23/25 1955     Glucose 231 mg/dL     POC Glucose Once [[id number]]  (Abnormal) Collected: 01/23/25 1738    Specimen: Blood Updated: 01/23/25 1740     Glucose 234 mg/dL     POC Glucose Once [[id number]]  (Abnormal) Collected: 01/23/25 1118    Specimen: Blood Updated: 01/23/25 1121     Glucose 255 mg/dL           Imaging Results (Last 24 Hours)       Procedure Component Value Units Date/Time    SLP FEES - Fiberoptic Endo Eval Swallow [[id number]] Resulted: 01/23/25 1432     Updated: 01/23/25 1432    Narrative:      This procedure was auto-finalized with no dictation required.    XR Humerus Right [[id number]] Collected: 01/23/25 1110     Updated: 01/23/25 1117    Narrative:      XR HUMERUS RIGHT    Date of Exam: 1/23/2025 10:05 AM EST    Indication: Pain    Comparison: October 15, 2020    Findings:  No acute fracture is identified. No focal osseous abnormality is identified. The joint spaces appear congruent. The soft tissues are unremarkable.      Impression:      Impression:  No acute osseous process identified.        Electronically Signed: Neo Maradiaga MD    1/23/2025 11:14 AM EST    Workstation ID: [id number]    XR Forearm 2 View Right [[id number]] Collected: 01/23/25 1056     Updated: 01/23/25 1101    Narrative:      XR FOREARM 2 VW RIGHT    Date of Exam: 1/23/2025 10:05 AM EST    Indication: Pain    Comparison: None available.    Findings:  No acute fracture is identified. No focal osseous abnormality is identified. The joint spaces appear congruent. The soft tissues are unremarkable.      Impression:      Impression:  No acute osseous process identified.        Electronically Signed: Neo Maradiaga MD    1/23/2025 10:58 AM EST    Workstation ID: [id number]           Orders (last 24 hrs)        Start     Ordered    01/24/25 0900  amLODIPine (NORVASC) tablet 10 mg  Daily         01/23/25 1237    01/24/25 0900  lactobacillus acidophilus (RISAQUAD) capsule 1 capsule  Daily         01/23/25 1237    01/24/25 0600  POC Glucose Once  PROCEDURE ONCE        Comments: Complete no more than 45 minutes prior to patient eating      01/24/25 0557    01/24/25 0007  POC Glucose Once  PROCEDURE ONCE        Comments: Complete no more than 45 minutes prior to patient eating      01/24/25 0004    01/23/25 2100  amitriptyline (ELAVIL) tablet 25 mg  Nightly         01/23/25 1237    01/23/25 1956  POC Glucose Once  PROCEDURE ONCE        Comments: Complete no more than 45 minutes prior to patient eating      01/23/25 1953    01/23/25 1741  POC Glucose Once  PROCEDURE ONCE        Comments: Complete no more than 45 minutes prior to patient eating      01/23/25 1738    01/23/25 1458  Diet Instructions To Nursing  Until Discontinued        Comments: Ok for 4oz of puree, 3x/day, as tolerated. Pull from nutrition room. Can have pudding, applesauce, oatmeal. No ice cream unless call down to have Magic Cup sent up. Remind pt to cough/clear throat a few times at end of snack.     Ok for 6-8 ice chips or 1/4 tsp H2O/hour.    01/23/25 1459    01/23/25 1200  insulin regular (humuLIN R,novoLIN R) injection 12 Units  Every 6 Hours Scheduled         01/23/25 0736    01/23/25 1121  POC Glucose Once  PROCEDURE ONCE        Comments: Complete no more than 45 minutes prior to patient eating      01/23/25 1118    01/23/25 1038  SLP FEES - Fiberoptic Endo Eval Swallow  Once         01/23/25 1037    01/23/25 0952  XR Forearm 2 View Right  1 Time Imaging         01/23/25 0952    01/23/25 0952  XR Humerus Right  1 Time Imaging         01/23/25 0952    01/23/25 0943  Auto Discontinue in 48 Hours if not Collected  ONCE CDIFF         01/21/25 0947    01/23/25 0900  insulin glargine (LANTUS, SEMGLEE) injection 35 Units  Every  12 Hours Scheduled         01/23/25 0736    01/22/25 1130  lactobacillus acidophilus (RISAQUAD) capsule 1 capsule  Daily,   Status:  Discontinued         01/22/25 1035    01/22/25 1037  meclizine (ANTIVERT) tablet 25 mg  3 Times Daily PRN         01/22/25 1037    01/21/25 2100  amitriptyline (ELAVIL) tablet 25 mg  Nightly,   Status:  Discontinued         01/21/25 0946    01/21/25 2100  DULoxetine (CYMBALTA) DR capsule 30 mg  Nightly         01/21/25 0946    01/21/25 1615  metoprolol succinate XL (TOPROL-XL) 24 hr tablet 50 mg  2 Times Daily         01/21/25 1519    01/21/25 1436  HYDROcodone-acetaminophen (NORCO) 5-325 MG per tablet 1 tablet  Every 4 Hours PRN         01/21/25 1436    01/21/25 1100  metoclopramide (REGLAN) injection 10 mg  Every 6 Hours         01/21/25 0947    01/21/25 1045  amLODIPine (NORVASC) tablet 10 mg  Daily,   Status:  Discontinued         01/21/25 0946    01/21/25 1045  DULoxetine (CYMBALTA) DR capsule 60 mg  Daily         01/21/25 0946    01/21/25 1002  Wound Care  2 Times Daily      Comments: Turn q 2 hours.  Apply allevyn dressings to sacrum and heels.    01/21/25 1001    01/21/25 0944  oxyCODONE (ROXICODONE) 5 MG/5ML solution 5 mg  Every 4 Hours PRN         01/21/25 0947    01/20/25 1200  Enoxaparin Sodium (LOVENOX) syringe 120 mg  Every 12 Hours Scheduled         01/20/25 1126    01/20/25 1200  POC Glucose Q6H  Every 6 Hours      Comments: Complete no more than 45 minutes prior to patient eating      01/20/25 1140    01/20/25 1200  insulin regular (humuLIN R,novoLIN R) injection 4-24 Units  Every 6 Hours Scheduled         01/20/25 1140    01/20/25 1137  dextrose (D50W) (25 g/50 mL) IV injection 25 g  Every 15 Minutes PRN         01/20/25 1140    01/20/25 1137  glucagon (GLUCAGEN) injection 1 mg  Every 15 Minutes PRN         01/20/25 1140    01/20/25 1137  Potassium Replacement - Follow Nurse / BPA Driven Protocol  As Needed         01/20/25 1140    01/17/25 2200  gabapentin  (NEURONTIN) capsule 300 mg  Every 8 Hours Scheduled         01/17/25 1453    01/17/25 1332  ondansetron (ZOFRAN) injection 4 mg  Every 6 Hours PRN         01/17/25 1332    01/16/25 0900  bumetanide (BUMEX) injection 1 mg  Daily         01/15/25 1125    01/15/25 1300  castor oil-balsam peru (VENELEX) ointment 1 Application  Every 12 Hours Scheduled         01/15/25 1209    01/15/25 0600  PICC Site Care  Weekly      Comments: Dressing Changes to PICC Site Every 7 Days and As Needed    01/14/25 1428    01/15/25 0600  Daily CHG Bath While Central Line in Place  Daily       01/14/25 1428    01/14/25 1639  Magnesium Standard Dose Replacement - Follow Nurse / BPA Driven Protocol  As Needed         01/14/25 1640    01/14/25 1428  sodium chloride 0.9 % flush 10 mL  As Needed         01/14/25 1428    01/14/25 1428  sodium chloride 0.9 % flush 20 mL  As Needed         01/14/25 1428    01/14/25 1400  midazolam (VERSED) injection 2 mg  Every 1 Hour PRN         01/14/25 1400    01/10/25 0929  Polyvinyl Alcohol-Povidone PF (ARTIFICIAL TEARS) 1.4-0.6 % ophthalmic solution 2 drop  Every 2 Hours PRN         01/10/25 0929    01/08/25 1600  Strict Intake & Output  Every Hour       01/08/25 1507    01/08/25 1508  Daily Weights  Daily       01/08/25 1507    01/07/25 1809  acetaminophen (TYLENOL) 160 MG/5ML oral solution 650 mg  Every 6 Hours PRN         01/07/25 1809    01/03/25 2100  nystatin (MYCOSTATIN) powder  Every 12 Hours Scheduled         01/03/25 1817    01/03/25 1330  Daily Weights  Daily       01/03/25 1329    01/03/25 1329  Nutrition Panel  Weekly       01/03/25 1329    01/03/25 1329  C-reactive Protein  Weekly       01/03/25 1329    01/03/25 1326  Tube Feeding Assessment and I/O  Every Shift       01/03/25 1329    01/02/25 1704  Monitor QTc  Every Shift       01/02/25 1704    01/02/25 1400  Incentive Spirometry  Every 4 Hours While Awake       01/02/25 1228    01/02/25 1228  ipratropium (ATROVENT) nebulizer solution 0.5 mg   Every 4 Hours PRN         01/02/25 1228    01/02/25 1228  ipratropium-albuterol (DUO-NEB) nebulizer solution 3 mL  Every 4 Hours PRN         01/02/25 1228    01/02/25 1200  Vital Signs Every Hour and Per Hospital Policy Based on Patient Condition  Every Hour       01/02/25 1143    01/02/25 1200  Intake & Output  Every Hour       01/02/25 1143    01/02/25 1144  Daily Weights  Daily       01/02/25 1143    01/02/25 1144  Daily CHG Bath While in Critical Care  Daily      Comments: Use for admission bath and length of critical care stay.    01/02/25 1143    01/02/25 1143  Oral Care - Patient Not on NPPV & Not Intubated  Every Shift       01/02/25 1143    Unscheduled  Jayesh & Document Feeding Tube Depth (in cm)  As Needed       01/03/25 1329    Unscheduled  Verify Feeding Tube Placement Upon Insertion & As Needed  As Needed       01/03/25 1329    Unscheduled  Flush Feeding Tube With 30-50mL Water As Needed  As Needed       01/03/25 1329    Unscheduled  Follow Hypoglycemia Standing Orders For Blood Glucose <70 & Notify Provider of Treatment  As Needed      Comments: Follow Hypoglycemia Orders As Outlined in Process Instructions (Open Order Report to View Full Instructions)  Notify Provider Any Time Hypoglycemia Treatment is Administered    01/07/25 1349    Unscheduled  Follow Hypoglycemia Standing Orders For Blood Glucose <70 & Notify Provider of Treatment  As Needed      Comments: Follow Hypoglycemia Orders As Outlined in Process Instructions (Open Order Report to View Full Instructions)  Notify Provider Any Time Hypoglycemia Treatment is Administered    01/20/25 1140    --  vitamin B-12 (CYANOCOBALAMIN) 1000 MCG tablet  Daily         01/03/25 1406    --  DULoxetine (CYMBALTA) 30 MG capsule  Daily         01/03/25 1407    --  DULoxetine (CYMBALTA) 30 MG capsule  Nightly         01/03/25 1407    --  metoprolol succinate XL (TOPROL-XL) 50 MG 24 hr tablet  2 Times Daily         01/03/25 1408    --  multivitamin with minerals  (MULTIVITAMIN WOMEN PO)  Daily         01/03/25 1410    --  Enoxaparin Sodium (LOVENOX) 100 MG/ML solution prefilled syringe syringe  Every 12 Hours Scheduled         01/03/25 1410    Signed and Held  albumin human 25 % IV SOLN 12.5 g  As Needed         Signed and Held    --  Insulin Lispro (HumaLOG KwikPen) 200 UNIT/ML solution pen-injector         01/16/25 1202                     Operative/Procedure Notes (all)        Beronica Crump APRN at 01/02/25 1309  Version 1 of 1       Procedure Orders    1. Insert Arterial Line [[id number]] ordered by Beronica Crump APRN               Post-procedure Diagnoses    1. Respiratory acidosis with metabolic acidosis [E87.4]                 Insert Arterial Line    Date/Time: 1/2/2025 1:09 PM    Performed by: Beronica Crump APRN  Authorized by: Beronica Crump APRN    Universal Protocol:     Verbal consent obtained?: Yes      Risks and benefits: Risks, benefits and alternatives were discussed      Consent given by: Jared.    Patient identity confirmed:  Arm band and hospital-assigned identification number    Time out: Immediately prior to the procedure a time out was called    A time out verifies correct patient, procedure, equipment, support staff and site/side marked as required:   Preparation:     Preparation: Patient was prepped and draped in usual sterile fashion    Indications:     Indications: multiple ABGs and hemodynamic monitoring    Location:     Location:  Right radial  Anesthesia:     Local anesthetic:  Lidocaine 1% without epinephrine    Patient sedated: Yes      Sedation:  See MAR for details    Analgesia:  See MAR for details    Vital signs: Vital signs monitored during sedation    Procedure Details:     Ultrasound Guidance: yes  The ultrasound was used for evaluation of possible access sites.  Vessel patency was confirmed with the ultrasound.  Needle entry into vessel was visualized in realtime with the ultrasound.   Permanent  "recorded image(s) of the vascular access site will be included in the patient record.      Apolinar's test normal?: Yes      Needle gauge:  20    Seldinger technique: Seldinger technique used      Number of attempts:  3  Post-procedure:     Post-procedure:  Line sutured and dressing applied    Post-procedure CMS: unable to assess, patient sedated.     patient tolerated the procedure well with no immediate complications     Electronically signed by YEIMI Aragon, 01/02/25, 1:10 PM EST.        Electronically signed by Beronica Crump APRN at 01/02/25 1310       Tiffanie You DO at 01/04/25 0948  Version 1 of 1       Procedure Orders    1. Bronchoscopy [[id number]] ordered by Tiffanie You DO               Post-procedure Diagnoses    1. Acute respiratory failure with hypoxia [J96.01]                 Bronchoscopy    Date/Time: 1/4/2025 9:48 AM    Performed by: Tiffanie You DO  Authorized by: Tiffanie You DO  Consent: The procedure was performed in an emergent situation.  Patient identity confirmed: anonymous protocol, patient vented/unresponsive  Time out: Immediately prior to procedure a \"time out\" was called to verify the correct patient, procedure, equipment, support staff and site/side marked as required.  Patient tolerance: patient tolerated the procedure well with no immediate complications  Comments: The bronchoscope was inserted into the ET tube and advanced to the main anna.     The bronchoscope was then advanced down the right mainstem and the right upper lobe, right middle lobe and right lower lobe were examined with scant secretions noted.     The bronchoscope was then retracted and advanced down the left mainstem. Copious mucopurulent secretions were noted in the left upper and left lower lobes. Saline irrigation was performed with suctioning of the secretions for airway clearance.     A sample was obtained for respiratory culture. The scope was then withdrawn. " "          Electronically signed by AvelinoTiffanie DO DEBORAH at 01/04/25 0950       Bettie Walter APRN at 01/08/25 1838  Version 1 of 1       Procedure Orders    1. Non-Tunneled Catheter [[id number]] ordered by Bettie Walter APRN               Post-procedure Diagnoses    1. MARIAA (acute kidney injury) [N17.9]                 Non-Tunneled Catheter    Date/Time: 1/8/2025 6:38 PM    Performed by: Bettie Walter APRN  Authorized by: Bettie Walter APRN  Consent: Written consent obtained.  Consent given by: power of   Patient identity confirmed: arm band and provided demographic data  Time out: Immediately prior to procedure a \"time out\" was called to verify the correct patient, procedure, equipment, support staff and site/side marked as required.  Indications: vascular access  Anesthesia: local infiltration    Anesthesia:  Local Anesthetic: lidocaine 1% without epinephrine  Anesthetic total: 5 mL    Sedation:  Patient sedated: yes  Sedatives: see MAR for details  Analgesia: see MAR for details    Preparation: skin prepped with 2% chlorhexidine  Skin prep agent dried: skin prep agent completely dried prior to procedure  Sterile barriers: all five maximum sterile barriers used - cap, mask, sterile gown, sterile gloves, and large sterile sheet  Hand hygiene: hand hygiene performed prior to central venous catheter insertion  Location details: right internal jugular  Patient position: flat  Catheter type: double lumen  Catheter size: 12 Fr  Pre-procedure: landmarks identified  Ultrasound guidance: yes  Number of attempts: 1  Successful placement: yes  Post-procedure: line sutured and dressing applied  Assessment: blood return through all ports, free fluid flow, placement verified by x-ray and no pneumothorax on x-ray  Patient tolerance: patient tolerated the procedure well with no immediate complications  Comments: Dark, nonpulsatile blood was aspirated from RIJ using finder needle under ultrasound.  Wire was " "threaded via finder needle without difficulty and visualized in RIJ under ultrasound.  Dilator was advanced over wire after small incision was made in the skin.  Dilator was removed.  Second dilator was advanced over wire. Dilator was removed. Catheter was advanced over the wire up to 16 cm.  Wire was removed.  Flushed end caps were placed on all three ports.  All three ports aspirated dark, nonpulsatile blood and flushed back well.  Catheter was sutured in place x 2.  An antibacterial dressing was applied over the site.      Senia Walter, MSN, APRN, ACN-AG  Pulmonary and Critical Care Medicine  Electronically signed by YEIMI Ballard, 01/08/25, 6:39 PM EST.             Electronically signed by Bettie Walter APRN at 01/08/25 8139       Vince Toscano MD at 01/14/25 1545  Version 1 of 1       Procedure Orders    1. Bronchoscopy [[id number]] ordered by Vince Toscano MD               Post-procedure Diagnoses    1. Acute respiratory failure with hypoxia [J96.01]                 Bronchoscopy    Date/Time: 1/14/2025 3:45 PM    Performed by: Vince Toscano MD  Authorized by: Vince Toscano MD  Consent: Written consent obtained.  Risks and benefits: risks, benefits and alternatives were discussed  Procedure consent: procedure consent matches procedure scheduled  Relevant documents: relevant documents present and verified  Test results: test results available and properly labeled  Site marked: the operative site was marked  Imaging studies: imaging studies available  Required items: required blood products, implants, devices, and special equipment available  Patient identity confirmed: anonymous protocol, patient vented/unresponsive  Time out: Immediately prior to procedure a \"time out\" was called to verify the correct patient, procedure, equipment, support staff and site/side marked as required.  Preparation: Patient was prepped and draped in the usual sterile fashion.  Patient tolerance: patient " tolerated the procedure well with no immediate complications  Comments: Patient was already intubated.  Disposable bronchoscope was passed through endotracheal tube which is situated at least 3 cm above anna.  There was whitish mucus emanating from left mainstem all the way up to anna and plugging up the entire left mainstem.  We were able to suction out secretions and subsequently lavaged the other segments with saline lavage.  Mucous plugs were present throughout the bronchial tree all the way into the lower lobe segments.  We were able to clean out the airway with saline lavage.  Right lung and minimal retained secretions which were suctioned.  No endobronchial lesions noted.  No significant airway inflammation or bleeding noted either.  Secretions were collected and sent to lab for cultures.          Electronically signed by Vince Toscano MD at 25 154       Qian Umana MD at 25 0527  Version 1 of 1       Procedures    1. INSERT CENTRAL LINE AT BEDSIDE [PRO85 (Custom)]                   CENTRAL VENOUS CATHETER PLACEMENT       Name: Natalia Arzate MRN: [id number] : 1985-39 y.o.-female    Procedure Performed: CVC Insertion using real-time ultrasonography    Performed by: Qian Umana MD    Indications: vascular access    Consent: the procedure was done emergently as medically necessary    Procedure Details: The Pre-Procedure/Time Out Brief was conducted prior to the procedure. The patient was placed in the supine position. The skin over the puncture site was prepped with chlorhexidine and draped in a sterile fashion. 1% lidocaine was injected for local anesthesia. Hat, mask, sterile gown and gloves were worn at all times during the procedure. Using Seldinger technique, under ultrasound guidance the left IJ vein was identified and cannulated using a needle on a 5 mL syringe. Non-pulsatile blood flow was noted and the syringe removed. Then, a guidewire was passed  through the needle into the vein. A small incision was made into the skin, along the guidewire using a surgical blade. The site was then dilated with the dilator provided, so as to create a tract for the catheter. After removing the dilator, a central venous catheter was placed over the wire, and, after insertion, the wire was removed. Using 2 separate sutures, the line was anchored. The area was covered with sterile dressing. The patient tolerated the procedure well.    Complications: none    Signed: Qian Umana MD  2025 05:27 EST      Electronically signed by Qian Umana MD at 25 0528          Physician Progress Notes (last 24 hours)        Diana Ghotra MD at 25 1553              [facility] Medicine Services  PROGRESS NOTE    Patient Name: Natalia Arzate  : 1985  MRN: [id number]    Date of Admission: 2025  Primary Care Physician: Bladimir Calle, JUMA    Subjective   Subjective     CC:  Resp failure    HPI:  Patient complaining of pain in her right arm today.  She says it started last night but denies any specific trauma to the area.  Nursing also cannot pinpoint any specific injury to the arm.  Nursing states there was some swelling of the right shoulder area this morning.      Objective   Objective     Vital Signs:   Temp:  [97.8 °F (36.6 °C)-99.2 °F (37.3 °C)] 99 °F (37.2 °C)  Heart Rate:  [] 94  Resp:  [16-20] 20  BP: (146-153)/(86-98) 150/98  Flow (L/min) (Oxygen Therapy):  [1-4] 1     Physical Exam:  Constitutional: No acute distress, awake, alert, laying in bed.  HENT: NCAT, mucous membranes moist, keofeed in place  Respiratory: Coarse upper airway noise bilaterally, respiratory effort normal  Cardiovascular: slightly tachycardic, no murmurs, rubs, or gallops  Gastrointestinal: Positive bowel sounds, soft, NT, nondistended  Musculoskeletal: 2+ bilateral ankle edema.  Pain with movement at the elbow of her right arm  as well as pain to palpation of the forearm as well as the upper arm.  She has decreased strength secondary to pain to extension of the arm as well as  strength.  Psychiatric: Appropriate affect, cooperative  Neurologic: Oriented x 3,  Cranial Nerves grossly intact to confrontation, speech clear  Skin: No rashes      Results Reviewed:  LAB RESULTS:      Lab 01/22/25  1201 01/21/25  1540 01/20/25  0658 01/19/25  2045 01/19/25  1810 01/19/25 0431 01/18/25 0431 01/17/25  1325 01/17/25  0526   WBC 3.90 4.79 4.82  --   --  5.15 4.84  --  5.41   HEMOGLOBIN 9.8* 9.6* 8.9*  --   --  8.1* 8.2*  --  7.8*   HEMATOCRIT 31.2* 30.9* 28.9*  --   --  26.7* 26.5*  --  25.2*   PLATELETS 195 215 210  --   --  184 144  --  138*   NEUTROS ABS 2.43  --   --   --   --  3.34 3.36  --  3.99   IMMATURE GRANS (ABS) 0.04  --   --   --   --  0.07* 0.04  --  0.04   LYMPHS ABS 0.53*  --   --   --   --  1.03 0.82  --  0.75   MONOS ABS 0.65  --   --   --   --  0.54 0.42  --  0.43   EOS ABS 0.22  --   --   --   --  0.14 0.17  --  0.17   MCV 92.6 93.6 93.5  --   --  96.4 94.6  --  94.4   CRP  --   --   --   --   --   --   --  4.07*  --    HEPARIN ANTI-XA  --  0.81 0.52 0.65 0.10* 0.61 0.46  --  0.41         Lab 01/23/25  0453 01/22/25  1201 01/21/25  1540 01/20/25  0658 01/19/25  0431 01/18/25  0431 01/17/25  1325 01/17/25  0526   SODIUM  --  131* 133* 131* 134* 138 135* 137   POTASSIUM  --  4.0 3.9 3.7 3.9 3.8  3.8 3.5 3.7   CHLORIDE  --  90* 90* 89* 93* 97* 93* 98   CO2  --  29.0 30.0* 30.0* 30.0* 30.0* 31.0* 29.0   ANION GAP  --  12.0 13.0 12.0 11.0 11.0 11.0 10.0   BUN  --  13 12 14 21* 25* 27* 29*   CREATININE  --  0.54* 0.58 0.61 0.66 0.66 0.71 0.71   EGFR  --  121.0 119.0 117.5 115.3 115.3 111.8 111.8   GLUCOSE  --  215* 207* 194* 164* 150* 138* 177*   CALCIUM  --  9.9 9.8 9.5 9.6 9.7 9.9 9.7   MAGNESIUM 2.3 1.0* 1.8 1.3* 1.5* 1.7 2.0 1.4*   PHOSPHORUS  --  4.2 4.2  --   --  4.3 3.8 3.6         Lab 01/18/25  0431 01/17/25  1326  01/17/25  0526   TOTAL PROTEIN 7.3 7.8 7.2   ALBUMIN 3.4* 3.6 3.3*   GLOBULIN 3.9 4.2 3.9   ALT (SGPT) 19 16 16   AST (SGOT) 53* 44* 44*   BILIRUBIN 0.3 0.3 0.3   ALK PHOS 124* 136* 129*             Lab 01/17/25  1325   CHOLESTEROL 147   TRIGLYCERIDES 345*             Lab 01/17/25  1016   PH, ARTERIAL 7.423   PCO2, ARTERIAL 41.7   PO2 .0*   FIO2 45   HCO3 ART 27.2*   BASE EXCESS ART 2.5*   CARBOXYHEMOGLOBIN 1.4     Brief Urine Lab Results  (Last result in the past 365 days)        Color   Clarity   Blood   Leuk Est   Nitrite   Protein   CREAT   Urine HCG        01/02/25 1213             52.3         01/02/25 1213 Yellow   Cloudy   Moderate (2+)   Negative   Negative   100 mg/dL (2+)                   Microbiology Results Abnormal       Procedure Component Value - Date/Time    Respiratory Culture - Wash, Lung, Left Lower Lobe [[id number]]  (Abnormal)  (Susceptibility) Collected: 01/14/25 1608    Lab Status: Final result Specimen: Wash from Lung, Left Lower Lobe Updated: 01/17/25 1221     Respiratory Culture Light growth (2+) Klebsiella pneumoniae ESBL     Comment:   Consider infectious disease consult.  Susceptibility results may not correlate to clinical outcomes.         Light growth (2+) Staphylococcus aureus, MRSA     Comment:   Considering site of infection and appropriate dosing, oxacillin (or cefoxitin) results can be applied to cefazolin, nafcillin, ampicillin/sulbactam, dicloxacillin, amoxicillin/clavulanate, cephalexin, and cefpodoxime.  Methicillin resistant Staphylococcus aureus, Patient may be an isolation risk.         Rare growth Normal Respiratory Margo     Gram Stain Many (4+) WBCs per low power field      Rare (1+) Epithelial cells per low power field      Rare (1+) Gram positive cocci in pairs and clusters    Susceptibility        Klebsiella pneumoniae ESBL      KELSIE      Ciprofloxacin Resistant      Ertapenem Susceptible      Levofloxacin Resistant      Meropenem Susceptible      Tetracycline  Resistant      Trimethoprim + Sulfamethoxazole Resistant                       Susceptibility        Staphylococcus aureus, MRSA      KELSIE      Clindamycin Resistant      Linezolid Susceptible      Oxacillin Resistant      Tetracycline Susceptible      Trimethoprim + Sulfamethoxazole Susceptible      Vancomycin Susceptible                       Susceptibility Comments       Klebsiella pneumoniae ESBL    With the exception of urinary-sourced infections, aminoglycosides should not be used as monotherapy.      Staphylococcus aureus, MRSA    This isolate is presumed to be clindamycin resistant based on detection of inducible clindamycin resistance.  Clindamycin may still be effective in some patients.  This isolate is presumed to be clindamycin resistant based on detection of inducible clindamycin resistance.  Clindamycin may still be effective in some patients.               Respiratory Culture - Sputum, ET Suction [[id number]]  (Abnormal) Collected: 01/14/25 9847    Lab Status: Final result Specimen: Sputum from ET Suction Updated: 01/17/25 1221     Respiratory Culture Light growth (2+) Klebsiella pneumoniae ESBL     Comment:   Consider infectious disease consult.  Susceptibility results may not correlate to clinical outcomes.         Light growth (2+) Staphylococcus aureus, MRSA     Comment:   Considering site of infection and appropriate dosing, oxacillin (or cefoxitin) results can be applied to cefazolin, nafcillin, ampicillin/sulbactam, dicloxacillin, amoxicillin/clavulanate, cephalexin, and cefpodoxime.  Methicillin resistant Staphylococcus aureus, Patient may be an isolation risk.        Gram Stain Many (4+) WBCs per low power field      Rare (1+) Epithelial cells per low power field      Few (2+) Gram positive cocci in pairs, chains and clusters    Narrative:      Refer to LLL Wash culture for MICS.     Blood Culture - Blood, Blood, Arterial Line [[id number]]  (Abnormal) Collected: 01/14/25 1434    Lab Status:  Final result Specimen: Blood, Arterial Line Updated: 01/17/25 0636     Blood Culture Staphylococcus, coagulase negative     Isolated from Anaerobic Bottle     Gram Stain Anaerobic Bottle Gram positive cocci in pairs and clusters    Narrative:      Probable contaminant requires clinical correlation, susceptibility not performed unless requested by physician.      Blood Culture ID, PCR - Blood, Blood, Arterial Line [[id number]]  (Abnormal) Collected: 01/14/25 1434    Lab Status: Final result Specimen: Blood, Arterial Line Updated: 01/16/25 0657     BCID, PCR Staph spp, not aureus or lugdunensis. Identification by BCID2 PCR.     BOTTLE TYPE Anaerobic Bottle    Blood Culture - Blood, Arm, Left [[id number]]  (Abnormal) Collected: 01/05/25 1305    Lab Status: Final result Specimen: Blood from Arm, Left Updated: 01/07/25 1100     Blood Culture Staphylococcus, coagulase negative     Isolated from Anaerobic Bottle     Gram Stain Anaerobic Bottle Gram positive cocci in clusters    Narrative:      Less than seven (7) mL's of blood was collected.  Insufficient quantity may yield false negative results.  Probable contaminant requires clinical correlation, susceptibility not performed unless requested by physician.    Blood Culture ID, PCR - Blood, Arm, Left [[id number]]  (Abnormal) Collected: 01/05/25 1305    Lab Status: Final result Specimen: Blood from Arm, Left Updated: 01/06/25 1435     BCID, PCR Staph spp, not aureus or lugdunensis. Identification by BCID2 PCR.     BOTTLE TYPE Anaerobic Bottle    Respiratory Culture - Bronchial Wash, Lung, Left Lower Lobe [[id number]]  (Abnormal)  (Susceptibility) Collected: 01/04/25 0755    Lab Status: Final result Specimen: Bronchial Wash from Lung, Left Lower Lobe Updated: 01/06/25 0908     Respiratory Culture Scant growth (1+) Staphylococcus aureus, MRSA     Comment:   Methicillin resistant Staphylococcus aureus, Patient may be an isolation risk.         No Normal Respiratory Margo     Gram  Stain Moderate (3+) WBCs seen      Rare (1+) Gram positive cocci in pairs and clusters    Susceptibility        Staphylococcus aureus, MRSA      KELSIE      Clindamycin Resistant      Linezolid Susceptible      Oxacillin Resistant      Tetracycline Susceptible      Trimethoprim + Sulfamethoxazole Susceptible      Vancomycin Susceptible                       Susceptibility Comments       Staphylococcus aureus, MRSA    This isolate is presumed to be clindamycin resistant based on detection of inducible clindamycin resistance.  Clindamycin may still be effective in some patients.  This isolate is presumed to be clindamycin resistant based on detection of inducible clindamycin resistance.  Clindamycin may still be effective in some patients.               Respiratory Culture - Sputum, ET Suction [[id number]]  (Abnormal)  (Susceptibility) Collected: 01/02/25 1212    Lab Status: Final result Specimen: Sputum from ET Suction Updated: 01/04/25 0939     Respiratory Culture Moderate growth (3+) Staphylococcus aureus, MRSA      No Normal Respiratory Margo     Gram Stain Many (4+) WBCs per low power field      Rare (1+) Epithelial cells per low power field      Many (4+) Gram positive cocci in pairs and clusters    Susceptibility        Staphylococcus aureus, MRSA      KELSIE      Clindamycin Resistant      Linezolid Susceptible      Oxacillin Resistant      Tetracycline Susceptible      Trimethoprim + Sulfamethoxazole Susceptible      Vancomycin Susceptible                       Susceptibility Comments       Staphylococcus aureus, MRSA    This isolate is presumed to be clindamycin resistant based on detection of inducible clindamycin resistance.  Clindamycin may still be effective in some patients.               MRSA Screen, PCR (Inpatient) - Swab, Nares [[id number]]  (Abnormal) Collected: 01/02/25 1246    Lab Status: Final result Specimen: Swab from Nares Updated: 01/02/25 1504     MRSA PCR Positive    Narrative:      The negative  predictive value of this diagnostic test is high and should only be used to consider de-escalating anti-MRSA therapy. A positive result may indicate colonization with MRSA and must be correlated clinically.            SLP FEES - Fiberoptic Endo Eval Swallow    Result Date: 1/23/2025  This procedure was auto-finalized with no dictation required.    XR Humerus Right    Result Date: 1/23/2025  XR HUMERUS RIGHT Date of Exam: 1/23/2025 10:05 AM EST Indication: Pain Comparison: October 15, 2020 Findings: No acute fracture is identified. No focal osseous abnormality is identified. The joint spaces appear congruent. The soft tissues are unremarkable.     Impression: Impression: No acute osseous process identified. Electronically Signed: Neo Maradiaga MD  1/23/2025 11:14 AM EST  Workstation ID: [id number]    XR Forearm 2 View Right    Result Date: 1/23/2025  XR FOREARM 2 VW RIGHT Date of Exam: 1/23/2025 10:05 AM EST Indication: Pain Comparison: None available. Findings: No acute fracture is identified. No focal osseous abnormality is identified. The joint spaces appear congruent. The soft tissues are unremarkable.     Impression: Impression: No acute osseous process identified. Electronically Signed: Neo Maradiaga MD  1/23/2025 10:58 AM EST  Workstation ID: [id number]    XR Abdomen KUB    Result Date: 1/22/2025  XR ABDOMEN KUB Date of Exam: 1/22/2025 7:42 AM EST Indication: Keofeed not working Comparison: 1/18/2025 Findings: Weighted feeding tube appears to terminate in the gastric body. Left upper quadrant clips. Nonobstructive bowel gas pattern. No acute osseous mallet.     Impression: Impression: Weighted feeding tube terminates in the gastric body. Consider repositioning and advancement if a postpyloric position is desired. Electronically Signed: Jacob Alvarado MD  1/22/2025 8:32 AM EST  Workstation ID: [id number]     Results for orders placed during the hospital encounter of 01/02/25    Adult Transthoracic Echo  Complete W/ Cont if Necessary Per Protocol    Interpretation Summary    Left ventricular systolic function is normal. Calculated left ventricular EF = 59.3% Left ventricular ejection fraction appears to be 61 - 65%.    Left ventricular wall thickness is consistent with borderline concentric hypertrophy.    Left ventricular diastolic function was normal.    Mild aortic valve stenosis is present.    Peak velocity of the flow distal to the aortic valve is 292 cm/s. Aortic valve mean pressure gradient is 20 mmHg.    Estimated right ventricular systolic pressure from tricuspid regurgitation is normal (<35 mmHg).      Current medications:  Scheduled Meds:amitriptyline, 25 mg, Nasogastric, Nightly  [START ON 1/24/2025] amLODIPine, 10 mg, Nasogastric, Daily  bumetanide, 1 mg, Intravenous, Daily  castor oil-balsam peru, 1 Application, Topical, Q12H  DULoxetine, 30 mg, Oral, Nightly  DULoxetine, 60 mg, Oral, Daily  enoxaparin, 120 mg, Subcutaneous, Q12H  gabapentin, 300 mg, Nasogastric, Q8H  insulin glargine, 35 Units, Subcutaneous, Q12H  insulin regular, 12 Units, Subcutaneous, Q6H  insulin regular, 4-24 Units, Subcutaneous, Q6H  [START ON 1/24/2025] lactobacillus acidophilus, 1 capsule, Nasogastric, Daily  metoclopramide, 10 mg, Intravenous, Q6H  metoprolol succinate XL, 50 mg, Oral, BID  nystatin, , Topical, Q12H      Continuous Infusions:   PRN Meds:.  acetaminophen    dextrose    glucagon (human recombinant)    HYDROcodone-acetaminophen    ipratropium    ipratropium-albuterol    Magnesium Standard Dose Replacement - Follow Nurse / BPA Driven Protocol    meclizine    midazolam    ondansetron    oxyCODONE    Polyvinyl Alcohol-Povidone PF    Potassium Replacement - Follow Nurse / BPA Driven Protocol    sodium chloride    sodium chloride    Assessment & Plan   Assessment & Plan     Active Hospital Problems    Diagnosis  POA    **Acute respiratory failure with hypercapnia [J96.02]  Yes    Sepsis [A41.9]  Yes    Type 2  diabetes mellitus with hyperglycemia [E11.65]  Yes    Hypothyroid [E03.9]  Yes    Factor 5 Leiden mutation, heterozygous [D68.51]  Yes    MARIAA (acute kidney injury) [N17.9]  Yes    Primary hypertension [I10]  Yes    PTSD (post-traumatic stress disorder) [F43.10]  Yes    History of DVT in adulthood [Z86.718]  Not Applicable      Resolved Hospital Problems   No resolved problems to display.        Brief Hospital Course to date:  Natalia Arzate is a 39 y.o. female with a history of HLD, Hypothyroidism, Afib, PE/DVT, Factor V Leiden mutation, and stroke who presented to [facility] with altered mental status. Labs there were significant for renal failure, hyperglycemia, and hypercapnic respiratory failure. Dx with  pneumonia and resp failure.  She was intubated and required the initiation of vasopressors. She was transferred to [facility] on 1/2/25 for ongoing care.    Acute hypoxic resp failure-improving  Severe sepsis with end organ failure- renal failure and resp failure  MRSA PNA--s/p vanc/linezolid  S/p intubation, extubated on 1/17.  ESBL Klebsiella PNA  --Pt underwent bronch on 1/14 with significant mucus plugging, post CXR with improvement in L lung aeration.  --Currently on NC--cont pulmonary toilet, wean O2 as tolerated.  --s/p merrem    Bacteremia- 1 bottle Gemella sanguinis--unclear significance  --s/p 10 days amp/unasyn.    MARIAA due to severe sepsis  S/P L nephrectomy in 2022  --Pt required CRRT ~ 1/8/25-1/11/25   --Renal fx improving, no need for further HD    Severe dysphagia  --cont ST.  --Currently with keofeed getting tube feeds.  Ok for 4oz puree 3x/day.  --Continue Zofran, reglan.  Will also try meclizine, as having some vertigo sx.     R arm pain  --xrays to forearm and humerus  --no swelling or redness, DVT less likely as pt is also on lovenox.  Will monitor the area.  Pain control.    factor V Leiden mutation  History of PE/DVT  History of stroke  --cont home lovenox    DM  -- Continue Lantus and regular  insulin while on tube feeds.    Hypertension  History A-fib  --Restarted home metoprolol, norvasc.  On lovenox    Diarrhea  --c diff neg.  Probiotic.  2/2 abx and tube feeds.    Hx PTSD  --cont home cymbalta and elavil    Expected Discharge Location and Transportation: pending  Expected Discharge   Expected Discharge Date: 1/27/2025; Expected Discharge Time:      VTE Prophylaxis:  Pharmacologic & mechanical VTE prophylaxis orders are present.         AM-PAC 6 Clicks Score (PT): 8 (01/22/25 1440)    CODE STATUS:   Code Status and Medical Interventions: CPR (Attempt to Resuscitate); Full Support   Ordered at: 01/02/25 1952     Code Status (Patient has no pulse and is not breathing):    CPR (Attempt to Resuscitate)     Medical Interventions (Patient has pulse or is breathing):    Full Support       Diana Ghotra MD  01/23/25        Electronically signed by Diana Ghotra MD at 01/23/25 1556       Consult Notes (last 24 hours)  Notes from 01/23/25 0938 through 01/24/25 0938   No notes of this type exist for this encounter.

## 2025-01-24 NOTE — THERAPY TREATMENT NOTE
Patient Name: Natalia Arzate  : 1985    MRN: [id number]                              Today's Date: 2025       Admit Date: 2025    Visit Dx:     ICD-10-CM ICD-9-CM   1. Respiratory acidosis with metabolic acidosis  E87.4 276.3   2. Acute respiratory failure with hypoxia  J96.01 518.81   3. MARIAA (acute kidney injury)  N17.9 584.9   4. Pharyngeal dysphagia  R13.13 787.23     Patient Active Problem List   Diagnosis    Nephrolithiasis    Ovarian teratoma, right    Other chronic pain    Pelvic pain    History of DVT in adulthood    History of pulmonary embolism    Ureteral calculus    Ischemic cerebrovascular accident (CVA)    Primary hypertension    Left lumbar radiculopathy    PTSD (post-traumatic stress disorder)    MARIAA (acute kidney injury)    Anemia    Dyslipidemia    Hypothyroid    Other secondary gout, multiple sites    Factor 5 Leiden mutation, heterozygous    Vitamin D deficiency    Vitamin B 12 deficiency    Type 2 diabetes mellitus with hyperglycemia    Hoarseness, persistent    Ureterolithiasis    Acute cystitis without hematuria    Hepatic steatosis    Right flank pain, chronic    Detrusor instability of bladder    Frequency of micturition    Acute respiratory failure with hypercapnia    Hyperkalemia    Sepsis    Respiratory acidosis with metabolic acidosis    Acute respiratory failure    Diabetes mellitus type 2, insulin dependent    Diabetic peripheral neuropathy associated with type 2 diabetes mellitus     Past Medical History:   Diagnosis Date    A-fib     Abnormal ECG     Anemia     Anxiety     Asthma     Cancer     Ovarian    Depression     Diabetes mellitus     DVT (deep venous thrombosis)     Factor 5 Leiden mutation, heterozygous     Fibroid     GERD (gastroesophageal reflux disease)     Gout     H/O abdominal abscess     History of sepsis     History of transfusion     Hyperlipidemia     Hypothyroid     Kidney stone     Migraines     Neuropathy     Ovarian cancer 2021     Ovarian cyst     PE (pulmonary embolism)     Polycystic ovary syndrome     Preeclampsia     Rh incompatibility     Stroke     TIA (transient ischemic attack)     Urinary tract infection     Varicella      Past Surgical History:   Procedure Laterality Date    ABDOMINAL SURGERY      CARDIAC CATHETERIZATION       SECTION      CHOLECYSTECTOMY      COLONOSCOPY      ENDOSCOPY      EXTRACORPOREAL SHOCK WAVE LITHOTRIPSY (ESWL) Left 10/22/2021    Procedure: EXTRACORPOREAL SHOCKWAVE LITHOTRIPSY;  Surgeon: Milan Motley MD;  Location: [facility];  Service: Urology;  Laterality: Left;    HYSTERECTOMY  2017    ovarian ca    INSERT CENTRAL LINE AT BEDSIDE  2025    LITHOTRIPSY      NEPHRECTOMY Left 10/2022    RIGHT OOPHORECTOMY      URETEROSCOPY LASER LITHOTRIPSY WITH STENT INSERTION Left 10/01/2021    Procedure: URETEROSCOPY WITH STENT PLACEMENT;  Surgeon: Milan Motley MD;  Location: [facility] OR;  Service: Urology;  Laterality: Left;    URETEROSCOPY LASER LITHOTRIPSY WITH STENT INSERTION Left 2022    Procedure: URETEROSCOPY STENT REMOVAL;  Surgeon: Milan Motley MD;  Location: [facility] OR;  Service: Urology;  Laterality: Left;    URETEROSCOPY LASER LITHOTRIPSY WITH STENT INSERTION Left 2022    Procedure: CYSTOSCOPY RETROGRADE LEFT WITH STENT PLACEMENT;  Surgeon: Milan Motley MD;  Location: [facility] OR;  Service: Urology;  Laterality: Left;      General Information       Row Name 25 1103          General Information    Patient Profile Reviewed yes  -SELENA     Existing Precautions/Restrictions fall;oxygen therapy device and L/min;other (see comments)  Contact; NG; back wound; hx of CVA with L sided deficits  -SELENA       Row Name 25 1103          Cognition    Orientation Status (Cognition) oriented x 3  -SELENA       Row Name 25 1103          Safety Issues/Impairments Affecting Functional Mobility    Safety Issues Affecting Function (Mobility) insight into  deficits/self-awareness;judgment;problem-solving;safety precaution awareness;sequencing abilities  -     Impairments Affecting Function (Mobility) balance;coordination;endurance/activity tolerance;grasp;motor control;motor planning;pain;postural/trunk control;range of motion (ROM);sensation/sensory awareness;shortness of breath;strength  -               User Key  (r) = Recorded By, (t) = Taken By, (c) = Cosigned By      Initials Name Provider Type    Pallavi Burr OT Occupational Therapist                     Mobility/ADL's       Row Name 01/24/25 1105          Bed Mobility    Bed Mobility rolling left;rolling right  -     Rolling Left [place] (Bed Mobility) dependent (less than 25% patient effort);2 person assist;verbal cues;nonverbal cues (demo/gesture)  -     Rolling Right [place] (Bed Mobility) maximum assist (25% patient effort);2 person assist;verbal cues;nonverbal cues (demo/gesture)  -     Bed Mobility, Safety Issues decreased use of arms for pushing/pulling;impaired trunk control for bed mobility;decreased use of legs for bridging/pushing  -     Assistive Device (Bed Mobility) bed rails;repositioning sheet  -       Row Name 01/24/25 1105          Transfers    Transfers bed-chair transfer  -       Row Name 01/24/25 1105          Bed-Chair Transfer    Bed-Chair [place] (Transfers) dependent (less than 25% patient effort);2 person assist  -     Assistive Device (Bed-Chair Transfers) lift device  -     Comment, (Bed-Chair Transfer) well-tolerated  -       Row Name 01/24/25 1105          Sit-Stand Transfer    Sit-Stand [place] (Transfers) not tested  -       Row Name 01/24/25 1105          Activities of Daily Living    BADL Assessment/Intervention lower body dressing;grooming  -       Row Name 01/24/25 1105          Lower Body Dressing Assessment/Training    [place] Level (Lower Body Dressing) don;socks;dependent (less than 25% patient effort)  -      Position (Lower Body Dressing) supine  -SELENA       Row Name 01/24/25 1105          Grooming Assessment/Training    [place] Level (Grooming) wash face, hands;dependent (less than 25% patient effort)  -SELENA     Position (Grooming) sitting up in bed  -SELENA               User Key  (r) = Recorded By, (t) = Taken By, (c) = Cosigned By      Initials Name Provider Type    Pallavi Burr OT Occupational Therapist                   Obj/Interventions       Row Name 01/24/25 1108          Hand (Therapeutic Exercise)    Hand (Therapeutic Exercise) strengthening exercise  -SELENA     Hand Strengthening (Therapeutic Exercise) left;right; strengthening;yellow;10 repetitions;other (see comments)  foam block  -SELENA       Row Name 01/24/25 1108          Motor Skills    Therapeutic Exercise hand  -SELENA       Row Name 01/24/25 1108          Balance    Balance Assessment sitting static balance  -SELENA     Static Sitting Balance contact guard  -SELENA     Dynamic Sitting Balance moderate assist  -SELENA     Position, Sitting Balance supported;sitting in chair  -SELENA     Balance Interventions sitting;dynamic reaching;core stability exercise;weight shifting activity  -SELENA     Comment, Balance Pt participated in dynamic reach across midline and weight shifting activity to promote improved trunk control needed for functional transfers.  -SELENA               User Key  (r) = Recorded By, (t) = Taken By, (c) = Cosigned By      Initials Name Provider Type    Pallavi Burr OT Occupational Therapist                   Goals/Plan    No documentation.                  Clinical Impression       Row Name 01/24/25 1110          Pain Assessment    Pretreatment Pain Rating 8/10  -SELENA     Posttreatment Pain Rating 8/10  -SELENA     Pain Location back  -SELENA     Pain Management Interventions exercise or physical activity utilized;positioning techniques utilized  -SELENA     Response to Pain Interventions activity participation with tolerable pain  -SELENA     Pre/Posttreatment Pain  Comment RN aware and managing  -SELENA       Row Name 01/24/25 1110          Plan of Care Review    Plan of Care Reviewed With patient;significant other  -SELENA     Progress improving  -SELENA     Outcome Evaluation Pt demonstrated improved activity tolerance this date, participated in trunk control and hand strengthening activities while seated in chair. Pt's progress toward ADL goals continues to be limited by significant weakness and pain. Pt motivated to work with therapy, gave good effort despite reported pain. Continue to progress per OT POC.  -SELENA       Row Name 01/24/25 1110          Therapy Plan Review/Discharge Plan (OT)    Anticipated Discharge Disposition (OT) inpatient rehabilitation facility  -SELENA       Row Name 01/24/25 1110          Vital Signs    O2 Delivery Pre Treatment nasal cannula  -SELENA     O2 Delivery Intra Treatment nasal cannula  -SELENA     O2 Delivery Post Treatment nasal cannula  -SELENA     Pre Patient Position Supine  -SELENA     Intra Patient Position Side Lying  -SELENA     Post Patient Position Sitting  -SELENA       Row Name 01/24/25 1110          Positioning and Restraints    Pre-Treatment Position in bed  -SELENA     Post Treatment Position chair  -SELENA     In Chair reclined;call light within reach;encouraged to call for assist;exit alarm on;with family/caregiver;waffle cushion;on mechanical lift sling;legs elevated;with nsg;RUE elevated;LUE elevated  -SELENA               User Key  (r) = Recorded By, (t) = Taken By, (c) = Cosigned By      Initials Name Provider Type    Pallavi Burr OT Occupational Therapist                   Outcome Measures       Row Name 01/24/25 1114          How much help from another is currently needed...    Putting on and taking off regular lower body clothing? 1  -SELENA     Bathing (including washing, rinsing, and drying) 1  -SELENA     Toileting (which includes using toilet bed pan or urinal) 1  -SELENA     Putting on and taking off regular upper body clothing 2  -SELENA     Taking care of personal grooming  (such as brushing teeth) 2  -SELENA     Eating meals 1  -SELENA     AM-PAC 6 Clicks Score (OT) 8  -SELENA       Row Name 01/24/25 1114          Functional Assessment    Outcome Measure Options AM-PAC 6 Clicks Daily Activity (OT)  -SELENA               User Key  (r) = Recorded By, (t) = Taken By, (c) = Cosigned By      Initials Name Provider Type    Pallavi Burr OT Occupational Therapist                    Occupational Therapy Education       Title: PT OT SLP Therapies (In Progress)       Topic: Occupational Therapy (In Progress)       Point: ADL training (Done)       Description:   Instruct learner(s) on proper safety adaptation and remediation techniques during self care or transfers.   Instruct in proper use of assistive devices.                  Learning Progress Summary            Patient Acceptance, E,D, DU by SELENA at 1/24/2025 1115    Comment: OT POC; UE HEP; hand strengthening; self-repositioning    Acceptance, E, VU,NR by  at 1/19/2025 0933    Comment: role of therapy, ongoing treatment plan and discharge recommendations   Family Acceptance, E, VU,NR by  at 1/19/2025 0933    Comment: role of therapy, ongoing treatment plan and discharge recommendations   Significant Other Acceptance, E,D, DU by SELENA at 1/24/2025 1115    Comment: OT POC; UE HEP; hand strengthening; self-repositioning                      Point: Home exercise program (Done)       Description:   Instruct learner(s) on appropriate technique for monitoring, assisting and/or progressing therapeutic exercises/activities.                  Learning Progress Summary            Patient Acceptance, E,D, DU by SELENA at 1/24/2025 1115    Comment: OT POC; UE HEP; hand strengthening; self-repositioning   Significant Other Acceptance, E,D, DU by SELENA at 1/24/2025 1115    Comment: OT POC; UE HEP; hand strengthening; self-repositioning                      Point: Precautions (Not Started)       Description:   Instruct learner(s) on prescribed precautions during self-care and  functional transfers.                  Learner Progress:  Not documented in this visit.              Point: Body mechanics (Done)       Description:   Instruct learner(s) on proper positioning and spine alignment during self-care, functional mobility activities and/or exercises.                  Learning Progress Summary            Patient Acceptance, E, VU,NR by  at 1/19/2025 0933    Comment: role of therapy, ongoing treatment plan and discharge recommendations   Family Acceptance, E, VU,NR by  at 1/19/2025 0933    Comment: role of therapy, ongoing treatment plan and discharge recommendations                                      User Key       Initials Effective Dates Name Provider Type Discipline     02/03/23 -  Alpa Bangura, OT Occupational Therapist OT    SELENA 06/16/21 -  Pallavi Fletcher OT Occupational Therapist OT                  OT Recommendation and Plan     Plan of Care Review  Plan of Care Reviewed With: patient, significant other  Progress: improving  Outcome Evaluation: Pt demonstrated improved activity tolerance this date, participated in trunk control and hand strengthening activities while seated in chair. Pt's progress toward ADL goals continues to be limited by significant weakness and pain. Pt motivated to work with therapy, gave good effort despite reported pain. Continue to progress per OT POC.     Time Calculation:         Time Calculation- OT       Row Name 01/24/25 0925             Time Calculation- OT    OT Start Time 0925  -SELENA      OT Received On 01/24/25  -SELENA         Timed Charges    35361 - OT Therapeutic Exercise Minutes 15  -SELENA      18328 - OT Self Care/Mgmt Minutes 8  -SELENA         Total Minutes    Timed Charges Total Minutes 23  -SELENA       Total Minutes 23  -SELENA                User Key  (r) = Recorded By, (t) = Taken By, (c) = Cosigned By      Initials Name Provider Type    Pallavi Burr OT Occupational Therapist                  Therapy Charges for Today       Code  Description Service Date Service Provider Modifiers Qty    27032643649 HC OT THER PROC EA 15 MIN 1/24/2025 Pallavi Fletcher OT GO 1    66556439251 HC OT SELF CARE/MGMT/TRAIN EA 15 MIN 1/24/2025 Pallavi Fletcher OT GO 1                 Pallavi Fletcher OT  1/24/2025

## 2025-01-24 NOTE — THERAPY TREATMENT NOTE
Patient Name: Natalia Arzate  : 1985    MRN: [id number]                              Today's Date: 2025       Admit Date: 2025    Visit Dx:     ICD-10-CM ICD-9-CM   1. Respiratory acidosis with metabolic acidosis  E87.4 276.3   2. Acute respiratory failure with hypoxia  J96.01 518.81   3. MARIAA (acute kidney injury)  N17.9 584.9   4. Pharyngeal dysphagia  R13.13 787.23     Patient Active Problem List   Diagnosis    Nephrolithiasis    Ovarian teratoma, right    Other chronic pain    Pelvic pain    History of DVT in adulthood    History of pulmonary embolism    Ureteral calculus    Ischemic cerebrovascular accident (CVA)    Primary hypertension    Left lumbar radiculopathy    PTSD (post-traumatic stress disorder)    MARIAA (acute kidney injury)    Anemia    Dyslipidemia    Hypothyroid    Other secondary gout, multiple sites    Factor 5 Leiden mutation, heterozygous    Vitamin D deficiency    Vitamin B 12 deficiency    Type 2 diabetes mellitus with hyperglycemia    Hoarseness, persistent    Ureterolithiasis    Acute cystitis without hematuria    Hepatic steatosis    Right flank pain, chronic    Detrusor instability of bladder    Frequency of micturition    Acute respiratory failure with hypercapnia    Hyperkalemia    Sepsis    Respiratory acidosis with metabolic acidosis    Acute respiratory failure    Diabetes mellitus type 2, insulin dependent    Diabetic peripheral neuropathy associated with type 2 diabetes mellitus     Past Medical History:   Diagnosis Date    A-fib     Abnormal ECG     Anemia     Anxiety     Asthma     Cancer     Ovarian    Depression     Diabetes mellitus     DVT (deep venous thrombosis)     Factor 5 Leiden mutation, heterozygous     Fibroid     GERD (gastroesophageal reflux disease)     Gout     H/O abdominal abscess     History of sepsis     History of transfusion     Hyperlipidemia     Hypothyroid     Kidney stone     Migraines     Neuropathy     Ovarian cancer 2021     Ovarian cyst     PE (pulmonary embolism)     Polycystic ovary syndrome     Preeclampsia     Rh incompatibility     Stroke     TIA (transient ischemic attack)     Urinary tract infection     Varicella      Past Surgical History:   Procedure Laterality Date    ABDOMINAL SURGERY      CARDIAC CATHETERIZATION       SECTION      CHOLECYSTECTOMY      COLONOSCOPY      ENDOSCOPY      EXTRACORPOREAL SHOCK WAVE LITHOTRIPSY (ESWL) Left 10/22/2021    Procedure: EXTRACORPOREAL SHOCKWAVE LITHOTRIPSY;  Surgeon: Milan Motley MD;  Location: [facility];  Service: Urology;  Laterality: Left;    HYSTERECTOMY  2017    ovarian ca    INSERT CENTRAL LINE AT BEDSIDE  2025    LITHOTRIPSY      NEPHRECTOMY Left 10/2022    RIGHT OOPHORECTOMY      URETEROSCOPY LASER LITHOTRIPSY WITH STENT INSERTION Left 10/01/2021    Procedure: URETEROSCOPY WITH STENT PLACEMENT;  Surgeon: Milan Motley MD;  Location: [facility];  Service: Urology;  Laterality: Left;    URETEROSCOPY LASER LITHOTRIPSY WITH STENT INSERTION Left 2022    Procedure: URETEROSCOPY STENT REMOVAL;  Surgeon: Milan Motley MD;  Location: [facility];  Service: Urology;  Laterality: Left;    URETEROSCOPY LASER LITHOTRIPSY WITH STENT INSERTION Left 2022    Procedure: CYSTOSCOPY RETROGRADE LEFT WITH STENT PLACEMENT;  Surgeon: Milan Motley MD;  Location: [facility];  Service: Urology;  Laterality: Left;      General Information       Row Name 25 1144          Physical Therapy Time and Intention    Document Type therapy note (daily note)  -ML     Mode of Treatment co-treatment;physical therapy  -ML       Row Name 25 1144          General Information    Patient Profile Reviewed yes  -ML     Existing Precautions/Restrictions fall;oxygen therapy device and L/min;other (see comments)  Contact; NG; back wound; hx of CVA with L sided deficits  -ML     Barriers to Rehab medically complex;previous functional deficit;physical  barrier  -ML       Row Name 01/24/25 1144          Cognition    Orientation Status (Cognition) oriented x 3  -ML       Row Name 01/24/25 1144          Safety Issues/Impairments Affecting Functional Mobility    Safety Issues Affecting Function (Mobility) insight into deficits/self-awareness;safety precaution awareness;sequencing abilities  -ML     Impairments Affecting Function (Mobility) balance;coordination;endurance/activity tolerance;grasp;motor control;motor planning;pain;postural/trunk control;range of motion (ROM);sensation/sensory awareness;shortness of breath;strength  -ML               User Key  (r) = Recorded By, (t) = Taken By, (c) = Cosigned By      Initials Name Provider Type    Debbi Lam Physical Therapist                   Mobility       Row Name 01/24/25 1144          Bed Mobility    Bed Mobility rolling left;rolling right  -ML     Rolling Left [place] (Bed Mobility) dependent (less than 25% patient effort);2 person assist;verbal cues;nonverbal cues (demo/gesture)  -ML     Rolling Right [place] (Bed Mobility) maximum assist (25% patient effort);2 person assist;verbal cues;nonverbal cues (demo/gesture)  -ML     Assistive Device (Bed Mobility) bed rails;repositioning sheet  -       Row Name 01/24/25 1144          Bed-Chair Transfer    Bed-Chair [place] (Transfers) dependent (less than 25% patient effort);2 person assist  -ML     Assistive Device (Bed-Chair Transfers) lift device  -       Row Name 01/24/25 1144          Sit-Stand Transfer    Sit-Stand [place] (Transfers) not tested;unable to assess  -ML               User Key  (r) = Recorded By, (t) = Taken By, (c) = Cosigned By      Initials Name Provider Type     Debbi Hall Physical Therapist                   Obj/Interventions       Row Name 01/24/25 1145          Motor Skills    Therapeutic Exercise hip;knee;ankle  -ML       Row Name 01/24/25 1145          Hip (Therapeutic Exercise)    Hip (Therapeutic Exercise)  isometric exercises  -ML     Hip Isometrics (Therapeutic Exercise) gluteal sets;10 repetitions;5 second hold  -ML       Row Name 01/24/25 1145          Knee (Therapeutic Exercise)    Knee (Therapeutic Exercise) strengthening exercise  -ML     Knee Strengthening (Therapeutic Exercise) bilateral;LAQ (long arc quad);10 repetitions  -ML       Row Name 01/24/25 1145          Ankle (Therapeutic Exercise)    Ankle (Therapeutic Exercise) AROM (active range of motion)  -ML     Ankle AROM (Therapeutic Exercise) right;dorsiflexion;plantarflexion;10 repetitions  -ML       Row Name 01/24/25 1145          Balance    Balance Assessment sitting static balance;sitting dynamic balance  -ML     Static Sitting Balance contact guard  -ML     Dynamic Sitting Balance moderate assist  -ML     Position, Sitting Balance supported;sitting in chair  -ML     Balance Interventions sitting;supported;static;dynamic;weight shifting activity;dynamic reaching;core stability exercise  -ML               User Key  (r) = Recorded By, (t) = Taken By, (c) = Cosigned By      Initials Name Provider Type    ML Debbi Hall Physical Therapist                   Goals/Plan    No documentation.                  Clinical Impression       Row Name 01/24/25 1146          Pain    Pretreatment Pain Rating 8/10  -ML     Posttreatment Pain Rating 8/10  -ML     Pain Location back  -ML     Pain Side/Orientation generalized;upper  -ML     Pain Management Interventions exercise or physical activity utilized;positioning techniques utilized;nursing notified  -ML     Response to Pain Interventions activity participation with tolerable pain  -ML       Row Name 01/24/25 1146          Plan of Care Review    Outcome Evaluation Patient motivated to participate with PT. Patient required lift transfer to chair, unable to attempt sit to stand transfer due to decreased static/dynamic sitting balance. Patient continues to present below baseline for mobility and would continue to benefit  from skilled PT to address strength, balance and activity tolerance deficits. Continue current PT POC.  -ML       Row Name 01/24/25 1146          Positioning and Restraints    Post Treatment Position chair  -ML     In Chair notified nsg;reclined;call light within reach;encouraged to call for assist;exit alarm on;with family/caregiver;waffle cushion;on mechanical lift sling;legs elevated;RUE elevated;LUE elevated;with nsg  -ML               User Key  (r) = Recorded By, (t) = Taken By, (c) = Cosigned By      Initials Name Provider Type    Debbi Lam Physical Therapist                   Outcome Measures       Row Name 01/24/25 1150          How much help from another person do you currently need...    Turning from your back to your side while in flat bed without using bedrails? 2  -ML     Moving from lying on back to sitting on the side of a flat bed without bedrails? 2  -ML     Moving to and from a bed to a chair (including a wheelchair)? 1  -ML     Standing up from a chair using your arms (e.g., wheelchair, bedside chair)? 1  -ML     Climbing 3-5 steps with a railing? 1  -ML     To walk in hospital room? 1  -ML     AM-PAC 6 Clicks Score (PT) 8  -ML     Highest Level of Mobility Goal 3 --> Sit at edge of bed  -ML       Row Name 01/24/25 1150 01/24/25 1114       Functional Assessment    Outcome Measure Options AM-PAC 6 Clicks Basic Mobility (PT)  -ML AM-PAC 6 Clicks Daily Activity (OT)  -SELENA              User Key  (r) = Recorded By, (t) = Taken By, (c) = Cosigned By      Initials Name Provider Type    Pallavi Burr, OT Occupational Therapist    Debbi Lam Physical Therapist                                 Physical Therapy Education       Title: PT OT SLP Therapies (In Progress)       Topic: Physical Therapy (In Progress)       Point: Mobility training (In Progress)       Learning Progress Summary            Patient Acceptance, E, VU,NR by NATY at 1/24/2025 1151    Acceptance, E, VU,NR by NS at 1/22/2025 1610     Comment: pt encouraged to continue with HEP between therapy sessions    Acceptance, E, NR by ML at 1/19/2025 1124   Family Acceptance, E, NR by ML at 1/19/2025 1124                      Point: Home exercise program (Done)       Learning Progress Summary            Patient Acceptance, E, VU,NR by ML at 1/24/2025 1151    Acceptance, E, VU,NR by NS at 1/22/2025 1613    Comment: pt encouraged to continue with HEP between therapy sessions                      Point: Body mechanics (Done)       Learning Progress Summary            Patient Acceptance, E, VU,NR by ML at 1/24/2025 1151    Acceptance, E, VU,NR by NS at 1/22/2025 1613    Comment: pt encouraged to continue with HEP between therapy sessions                      Point: Precautions (In Progress)       Learning Progress Summary            Patient Acceptance, E, VU,NR by NS at 1/22/2025 1613    Comment: pt encouraged to continue with HEP between therapy sessions    Acceptance, E, NR by ML at 1/19/2025 1124   Family Acceptance, E, NR by ML at 1/19/2025 1124                                      User Key       Initials Effective Dates Name Provider Type Discipline    NS 06/16/21 -  Germaine Thao, PT Physical Therapist PT     04/22/21 -  Debbi Hall Physical Therapist PT                  PT Recommendation and Plan  Planned Therapy Interventions (PT): balance training, bed mobility training, gait training, home exercise program, patient/family education, postural re-education, strengthening, transfer training, ROM (range of motion), neuromuscular re-education  Outcome Evaluation: Patient motivated to participate with PT. Patient required lift transfer to chair, unable to attempt sit to stand transfer due to decreased static/dynamic sitting balance. Patient continues to present below baseline for mobility and would continue to benefit from skilled PT to address strength, balance and activity tolerance deficits. Continue current PT POC.     Time Calculation:          PT Charges       Row Name 01/24/25 1151 01/24/25 1019          Time Calculation    Start Time 0920  -ML 0856  -     PT Received On 01/24/25  -ML --     PT Goal Re-Cert Due Date -- 01/29/25  -        Timed Charges    30459 - PT Therapeutic Exercise Minutes 8  -ML --     79136 - PT Therapeutic Activity Minutes 15  -ML --        Untimed Charges    22028-Aemupdtfb debridement -- 15  -LH     19211-YSCI Mist -- 15  -LH        Total Minutes    Timed Charges Total Minutes 23  -ML --     Untimed Charges Total Minutes -- 30  -LH      Total Minutes 23  -ML 30  -LH               User Key  (r) = Recorded By, (t) = Taken By, (c) = Cosigned By      Initials Name Provider Type    Debbi Lam Physical Therapist     Jonah Kaur, PT Physical Therapist                  Therapy Charges for Today       Code Description Service Date Service Provider Modifiers Qty    42292732466 HC PT THER PROC EA 15 MIN 1/24/2025 Debbi Hall GP 1    86646429241 HC PT THERAPEUTIC ACT EA 15 MIN 1/24/2025 Debbi Hall GP 1            PT G-Codes  Outcome Measure Options: AM-PAC 6 Clicks Basic Mobility (PT)  AM-PAC 6 Clicks Score (PT): 8  AM-PAC 6 Clicks Score (OT): 8  PT Discharge Summary  Anticipated Discharge Disposition (PT): inpatient rehabilitation facility    Debbi Hall  1/24/2025

## 2025-01-24 NOTE — THERAPY WOUND CARE TREATMENT
Acute Care - Wound/Debridement Treatment Note  [facility]     Patient Name: Natalia Arzate  : 1985  MRN: [id number]  Today's Date: 2025                Admit Date: 2025    Visit Dx:    ICD-10-CM ICD-9-CM   1. Respiratory acidosis with metabolic acidosis  E87.4 276.3   2. Acute respiratory failure with hypoxia  J96.01 518.81   3. MARIAA (acute kidney injury)  N17.9 584.9   4. Pharyngeal dysphagia  R13.13 787.23     L lateral/posterior flank          Sacrum      Patient Active Problem List   Diagnosis    Nephrolithiasis    Ovarian teratoma, right    Other chronic pain    Pelvic pain    History of DVT in adulthood    History of pulmonary embolism    Ureteral calculus    Ischemic cerebrovascular accident (CVA)    Primary hypertension    Left lumbar radiculopathy    PTSD (post-traumatic stress disorder)    MARIAA (acute kidney injury)    Anemia    Dyslipidemia    Hypothyroid    Other secondary gout, multiple sites    Factor 5 Leiden mutation, heterozygous    Vitamin D deficiency    Vitamin B 12 deficiency    Type 2 diabetes mellitus with hyperglycemia    Hoarseness, persistent    Ureterolithiasis    Acute cystitis without hematuria    Hepatic steatosis    Right flank pain, chronic    Detrusor instability of bladder    Frequency of micturition    Acute respiratory failure with hypercapnia    Hyperkalemia    Sepsis    Respiratory acidosis with metabolic acidosis    Acute respiratory failure    Diabetes mellitus type 2, insulin dependent    Diabetic peripheral neuropathy associated with type 2 diabetes mellitus        Past Medical History:   Diagnosis Date    A-fib     Abnormal ECG     Anemia     Anxiety     Asthma     Cancer     Ovarian    Depression     Diabetes mellitus     DVT (deep venous thrombosis)     Factor 5 Leiden mutation, heterozygous     Fibroid     GERD (gastroesophageal reflux disease)     Gout     H/O abdominal abscess     History of sepsis     History of transfusion     Hyperlipidemia      Hypothyroid     Kidney stone     Migraines     Neuropathy     Ovarian cancer 2021    Ovarian cyst     PE (pulmonary embolism)     Polycystic ovary syndrome     Preeclampsia     Rh incompatibility     Stroke     TIA (transient ischemic attack)     Urinary tract infection     Varicella         Past Surgical History:   Procedure Laterality Date    ABDOMINAL SURGERY      CARDIAC CATHETERIZATION       SECTION      CHOLECYSTECTOMY      COLONOSCOPY      ENDOSCOPY      EXTRACORPOREAL SHOCK WAVE LITHOTRIPSY (ESWL) Left 10/22/2021    Procedure: EXTRACORPOREAL SHOCKWAVE LITHOTRIPSY;  Surgeon: Milan Motley MD;  Location: [facility];  Service: Urology;  Laterality: Left;    HYSTERECTOMY  2017    ovarian ca    INSERT CENTRAL LINE AT BEDSIDE  2025    LITHOTRIPSY      NEPHRECTOMY Left 10/2022    RIGHT OOPHORECTOMY      URETEROSCOPY LASER LITHOTRIPSY WITH STENT INSERTION Left 10/01/2021    Procedure: URETEROSCOPY WITH STENT PLACEMENT;  Surgeon: Milan Motley MD;  Location: [facility];  Service: Urology;  Laterality: Left;    URETEROSCOPY LASER LITHOTRIPSY WITH STENT INSERTION Left 2022    Procedure: URETEROSCOPY STENT REMOVAL;  Surgeon: Milan Motley MD;  Location: [facility];  Service: Urology;  Laterality: Left;    URETEROSCOPY LASER LITHOTRIPSY WITH STENT INSERTION Left 2022    Procedure: CYSTOSCOPY RETROGRADE LEFT WITH STENT PLACEMENT;  Surgeon: Milan Motley MD;  Location: [facility];  Service: Urology;  Laterality: Left;           Wound 25 0400 Left lateral flank (Active)   Wound Image    25 0856   Dressing Appearance dressing loose;moist drainage 25 0856   Base moist;necrotic;yellow;slough;brown;eschar 25 0856   Periwound dry;intact 25 0856   Periwound Temperature warm 25 0856   Periwound Skin Turgor soft 25 0856   Edges open;irregular 25 0856   Wound Length (cm) 1.5 cm 25 0856   Wound Width (cm) 17 cm  01/24/25 0856   Wound Surface Area (cm^2) 25.5 cm^2 01/24/25 0856   Drainage Characteristics/Odor serosanguineous 01/24/25 0856   Drainage Amount scant 01/24/25 0856   Care, Wound cleansed with;soap and water;debrided;irrigated with;wound cleanser;ultrasound therapy, non contact low frequency;other (see comments) 01/24/25 0856   Dressing Care dressing applied;petroleum-based;gauze;low-adherent;silicone border foam 01/24/25 0856   Periwound Care cleansed with pH balanced cleanser;dry periwound area maintained 01/24/25 0856       Wound 01/15/25 0314 Left gluteal blisters (Active)   Drainage Amount none 01/23/25 2020   Care, Wound cleansed with;soap and water 01/23/25 2020       Wound 01/17/25 2133 Right lateral throat (Active)   Dressing Appearance dry;intact 01/23/25 2020   Closure Open to air 01/23/25 2020         WOUND DEBRIDEMENT  Total area of Debridement: 4cm2  Debridement Site 1  Location- Site 1: L lateral back  Selective Debridement- Site 1: Wound Surface <20cmsq  Instruments- Site 1: tweezers  Excised Tissue Description- Site 1: minimum, slough, eschar  Bleeding- Site 1: none               PT Assessment (Last 12 Hours)       PT Evaluation and Treatment       Row Name 01/24/25 0856          Physical Therapy Time and Intention    Subjective Information complains of;weakness;pain;fatigue  -     Document Type therapy note (daily note);wound care  -     Mode of Treatment physical therapy;individual therapy  -       Row Name 01/24/25 0856          General Information    Patient Observations alert;cooperative;agree to therapy  -       Row Name 01/24/25 0856          Pain    Pretreatment Pain Rating 8/10  -     Posttreatment Pain Rating 8/10  -     Pain Location back  -     Pain Side/Orientation generalized;upper  -     Pain Management Interventions positioning techniques utilized  -     Response to Pain Interventions no change per patient report  -       Row Name 01/24/25 0856          Cognition     Affect/Mental Status (Cognition) WFL  -       Row Name 01/24/25 0856          Wound 01/05/25 0400 Left lateral flank    Wound - Properties Group Placement Date: 01/05/25 -BC Placement Time: 0400 -BC Side: Left  -BC Orientation: lateral  -BC Location: flank  -BC    Wound Image Images linked: 2  -LH     Dressing Appearance dressing loose;moist drainage  -     Base moist;necrotic;yellow;slough;brown;eschar  minimal viable tissue visible  -     Periwound dry;intact  -     Periwound Temperature warm  -     Periwound Skin Turgor soft  -     Edges open;irregular  -     Wound Length (cm) 1.5 cm  -     Wound Width (cm) 17 cm  -     Wound Depth (cm) --  obscured  -     Wound Surface Area (cm^2) 25.5 cm^2  -     Drainage Characteristics/Odor serosanguineous  -     Drainage Amount scant  -LH     Care, Wound cleansed with;soap and water;debrided;irrigated with;wound cleanser;ultrasound therapy, non contact low frequency;other (see comments)  7min MIST w/ Vashe  -     Dressing Care dressing applied;petroleum-based;gauze;low-adherent;silicone border foam  Venelex, xeroform, mepilex border  -     Periwound Care cleansed with pH balanced cleanser;dry periwound area maintained  nosting  -     Retired Wound - Properties Group Placement Date: 01/05/25 -BC Placement Time: 0400 -BC Side: Left  -BC Orientation: lateral  -BC Location: flank  -BC    Retired Wound - Properties Group Placement Date: 01/05/25 -BC Placement Time: 0400 -BC Side: Left  -BC Orientation: lateral  -BC Location: flank  -BC    Retired Wound - Properties Group Date first assessed: 01/05/25 -BC Time first assessed: 0400 -BC Side: Left  -BC Location: flank  -BC      Row Name             Wound 01/15/25 0314 Left gluteal blisters    Wound - Properties Group Placement Date: 01/15/25  -SE Placement Time: 0314  -SE Side: Left  -SE Location: gluteal  -SE Primary Wound Type: Blisters  -SE Type: blisters  -SE Present on Original  Admission: N  -SE    Retired Wound - Properties Group Placement Date: 01/15/25  -SE Placement Time: 0314  -SE Present on Original Admission: N  -SE Side: Left  -SE Location: gluteal  -SE Primary Wound Type: Blisters  -SE Type: blisters  -SE    Retired Wound - Properties Group Placement Date: 01/15/25  -SE Placement Time: 0314  -SE Present on Original Admission: N  -SE Side: Left  -SE Location: gluteal  -SE Primary Wound Type: Blisters  -SE Type: blisters  -SE    Retired Wound - Properties Group Date first assessed: 01/15/25  -SE Time first assessed: 0314  -SE Present on Original Admission: N  -SE Side: Left  -SE Location: gluteal  -SE Primary Wound Type: Blisters  -SE Type: blisters  -SE      Row Name             Wound 01/17/25 2133 Right lateral throat    Wound - Properties Group Placement Date: 01/17/25  - Placement Time: 2133 -MH Side: Right  -MH Orientation: lateral  -MH Location: throat  -MH Primary Wound Type: Puncture  -MH    Retired Wound - Properties Group Placement Date: 01/17/25  - Placement Time: 2133 -MH Side: Right  -MH Orientation: lateral  -MH Location: throat  -MH Primary Wound Type: Puncture  -MH    Retired Wound - Properties Group Placement Date: 01/17/25  - Placement Time: 2133 -MH Side: Right  -MH Orientation: lateral  -MH Location: throat  -MH Primary Wound Type: Puncture  -MH    Retired Wound - Properties Group Date first assessed: 01/17/25  - Time first assessed: 2133 -MH Side: Right  -MH Location: throat  -MH Primary Wound Type: Puncture  -MH      Row Name             Wound 01/21/25 0746 lumbar spine    Wound - Properties Group Placement Date: 01/21/25  - Placement Time: 0746 -AH Location: lumbar spine  -AH Primary Wound Type: MASD  -AH, large white wound, appearing to be worse than MASD  Present on Original Admission: N  -AH Additional Comments: nurse put zeroform and mepliex  -AH    Retired Wound - Properties Group Placement Date: 01/21/25  - Placement Time: 0746 -AH  Present on Original Admission: N  -AH Location: lumbar spine  -AH Primary Wound Type: MASD  -AH, large white wound, appearing to be worse than MASD  Additional Comments: nurse put zeroform and mepliex  -AH    Retired Wound - Properties Group Placement Date: 01/21/25  - Placement Time: 0746 -AH Present on Original Admission: N  -AH Location: lumbar spine  -AH Primary Wound Type: MASD  -AH, large white wound, appearing to be worse than MASD  Additional Comments: nurse put zeroform and mepliex  -AH    Retired Wound - Properties Group Date first assessed: 01/21/25  - Time first assessed: 0746  -AH Present on Original Admission: N  -AH Location: lumbar spine  -AH Primary Wound Type: MASD  -AH, large white wound, appearing to be worse than MASD  Additional Comments: nurse put zeroform and mepliex  -AH      Row Name 01/24/25 0856          Coping    Observed Emotional State calm;cooperative;flat  -     Verbalized Emotional State acceptance  -     Trust Relationship/Rapport care explained;questions answered  -       Row Name 01/24/25 0856          Plan of Care Review    Plan of Care Reviewed With patient  -     Progress improving  -     Outcome Evaluation PT performed MIST to pt's L posterior/lateral flank wound this date. Pt's wound remains with mostly moist necrotic yellow slough and some aeras of thin brown eschar. PT able to debride small amount of necrotic tissue to help improve wound healing potential. Of note, pt's sacral wound appears fully resurfaced this date, so PT deferred MIST in this area, however continued with venelex and optifoam dressings to help improve skin integrity and reduce recurrance of wound. Pt due for next MIST/dressing change in 2-3 days.  -       Row Name 01/24/25 0856          Positioning and Restraints    Pre-Treatment Position in bed  -     Post Treatment Position bed  -     In Bed supine;call light within reach;encouraged to call for assist;with PT;with OT  -                User Key  (r) = Recorded By, (t) = Taken By, (c) = Cosigned By      Initials Name Provider Type     Jojo Whitehead, RN Registered Nurse    Chary Bloom, RN Registered Nurse    Jonha Soliz, PT Physical Therapist    Viola Espinoza, RN Registered Nurse    Rajani Montilla RN Registered Nurse                  Physical Therapy Education       Title: PT OT SLP Therapies (In Progress)       Topic: Physical Therapy (In Progress)       Point: Mobility training (In Progress)       Learning Progress Summary            Patient Acceptance, E, VU,NR by NS at 1/22/2025 1613    Comment: pt encouraged to continue with HEP between therapy sessions    Acceptance, E, NR by ML at 1/19/2025 1124   Family Acceptance, E, NR by ML at 1/19/2025 1124                      Point: Home exercise program (Done)       Learning Progress Summary            Patient Acceptance, E, VU,NR by NS at 1/22/2025 1613    Comment: pt encouraged to continue with HEP between therapy sessions                      Point: Body mechanics (Done)       Learning Progress Summary            Patient Acceptance, E, VU,NR by NS at 1/22/2025 1613    Comment: pt encouraged to continue with HEP between therapy sessions                      Point: Precautions (In Progress)       Learning Progress Summary            Patient Acceptance, E, VU,NR by NS at 1/22/2025 1613    Comment: pt encouraged to continue with HEP between therapy sessions    Acceptance, E, NR by ML at 1/19/2025 1124   Family Acceptance, E, NR by ML at 1/19/2025 1124                                      User Key       Initials Effective Dates Name Provider Type Discipline    NS 06/16/21 -  Germaine Thao, PT Physical Therapist PT     04/22/21 -  Debbi Hall Physical Therapist PT                    Recommendation and Plan  Anticipated Discharge Disposition (PT): inpatient rehabilitation facility  Planned Therapy Interventions (PT): balance training, bed mobility training,  gait training, home exercise program, patient/family education, postural re-education, strengthening, transfer training, ROM (range of motion), neuromuscular re-education  Therapy Frequency (PT): daily  Plan of Care Reviewed With: patient   Progress: improving       Progress: improving  Outcome Evaluation: PT performed MIST to pt's L posterior/lateral flank wound this date. Pt's wound remains with mostly moist necrotic yellow slough and some aeras of thin brown eschar. PT able to debride small amount of necrotic tissue to help improve wound healing potential. Of note, pt's sacral wound appears fully resurfaced this date, so PT deferred MIST in this area, however continued with venelex and optifoam dressings to help improve skin integrity and reduce recurrance of wound. Pt due for next MIST/dressing change in 2-3 days.  Plan of Care Reviewed With: patient            Time Calculation   PT Charges       Row Name 01/24/25 1019             Time Calculation    Start Time 0856  -      PT Goal Re-Cert Due Date 01/29/25  -         Untimed Charges    46202-Qpraqvhza debridement 15  -LH      85303-RCEI Mist 15  -         Total Minutes    Untimed Charges Total Minutes 30  -       Total Minutes 30  -LH                User Key  (r) = Recorded By, (t) = Taken By, (c) = Cosigned By      Initials Name Provider Type     Jonah Kaur, PT Physical Therapist                      Therapy Charges for Today       Code Description Service Date Service Provider Modifiers Qty    12982589677 HC MERRICK DEBRIDE OPEN WOUND UP TO 20CM 1/24/2025 Jonah Kaur, PT GP 1    [id number] HC PT [facility] MIST 1/24/2025 Jonah Kaur, PT GP 1              PT G-Codes  Outcome Measure Options: AM-PAC 6 Clicks Basic Mobility (PT)  AM-PAC 6 Clicks Score (PT): 8  AM-PAC 6 Clicks Score (OT): 9       Jonah Kaur PT  1/24/2025

## 2025-01-24 NOTE — PLAN OF CARE
Goal Outcome Evaluation:  Plan of Care Reviewed With: patient           Outcome Evaluation: Patient motivated to participate with PT. Patient required lift transfer to chair, unable to attempt sit to stand transfer due to decreased static/dynamic sitting balance. Patient continues to present below baseline for mobility and would continue to benefit from skilled PT to address strength, balance and activity tolerance deficits. Continue current PT POC.    Anticipated Discharge Disposition (PT): inpatient rehabilitation facility

## 2025-01-24 NOTE — PROGRESS NOTES
[facility] Medicine Services  PROGRESS NOTE    Patient Name: Natalia Arzate  : 1985  MRN: [id number]    Date of Admission: 2025  Primary Care Physician: Bladimir Calle PA-C    Subjective   Subjective     CC:  Resp failure    HPI:  Patient still with pain in her right arm.  Complaining of skin breakdown in her bottom.  Nursing states that her stool output has significantly improved.  She did not do well with her fees yesterday although she is allowed to try some puréed food.      Objective   Objective     Vital Signs:   Temp:  [96.6 °F (35.9 °C)-98.6 °F (37 °C)] 97.8 °F (36.6 °C)  Heart Rate:  [] 106  Resp:  [16-20] 18  BP: (141-166)/() 154/104  Flow (L/min) (Oxygen Therapy):  [2-6] 3     Physical Exam:  Constitutional: No acute distress, awake, alert, laying in bed.  HENT: NCAT, mucous membranes moist, keofeed in place  Respiratory: Coarse upper airway noise bilaterally, respiratory effort normal  Cardiovascular: slightly tachycardic, no murmurs, rubs, or gallops  Gastrointestinal: Positive bowel sounds, soft, NT, nondistended  Musculoskeletal: 2+ bilateral ankle edema.  Pain pinpoint at the acetabulum area of her right shoulder as well as pain with palpation of the upper humerus, significantly less in the forearm today.  No pain and palpation of the right chest area, no swelling or redness to the area.    Psychiatric: Appropriate affect, cooperative  Neurologic: Oriented x 3,  Cranial Nerves grossly intact to confrontation, speech clear  Skin: No rashes      Results Reviewed:  LAB RESULTS:      Lab 25  1201 25  1540 25  0658 25  2045 25  1810 25  0431 25  0431   WBC 3.90 4.79 4.82  --   --  5.15 4.84   HEMOGLOBIN 9.8* 9.6* 8.9*  --   --  8.1* 8.2*   HEMATOCRIT 31.2* 30.9* 28.9*  --   --  26.7* 26.5*   PLATELETS 195 215 210  --   --  184 144   NEUTROS ABS 2.43  --   --   --   --  3.34 3.36   IMMATURE GRANS (ABS)  0.04  --   --   --   --  0.07* 0.04   LYMPHS ABS 0.53*  --   --   --   --  1.03 0.82   MONOS ABS 0.65  --   --   --   --  0.54 0.42   EOS ABS 0.22  --   --   --   --  0.14 0.17   MCV 92.6 93.6 93.5  --   --  96.4 94.6   HEPARIN ANTI-XA  --  0.81 0.52 0.65 0.10* 0.61 0.46         Lab 01/23/25  0453 01/22/25  1201 01/21/25  1540 01/20/25  0658 01/19/25  0431 01/18/25  0431   SODIUM  --  131* 133* 131* 134* 138   POTASSIUM  --  4.0 3.9 3.7 3.9 3.8  3.8   CHLORIDE  --  90* 90* 89* 93* 97*   CO2  --  29.0 30.0* 30.0* 30.0* 30.0*   ANION GAP  --  12.0 13.0 12.0 11.0 11.0   BUN  --  13 12 14 21* 25*   CREATININE  --  0.54* 0.58 0.61 0.66 0.66   EGFR  --  121.0 119.0 117.5 115.3 115.3   GLUCOSE  --  215* 207* 194* 164* 150*   CALCIUM  --  9.9 9.8 9.5 9.6 9.7   MAGNESIUM 2.3 1.0* 1.8 1.3* 1.5* 1.7   PHOSPHORUS  --  4.2 4.2  --   --  4.3         Lab 01/18/25  0431   TOTAL PROTEIN 7.3   ALBUMIN 3.4*   GLOBULIN 3.9   ALT (SGPT) 19   AST (SGOT) 53*   BILIRUBIN 0.3   ALK PHOS 124*                         Brief Urine Lab Results  (Last result in the past 365 days)        Color   Clarity   Blood   Leuk Est   Nitrite   Protein   CREAT   Urine HCG        01/02/25 1213             52.3         01/02/25 1213 Yellow   Cloudy   Moderate (2+)   Negative   Negative   100 mg/dL (2+)                   Microbiology Results Abnormal       Procedure Component Value - Date/Time    Respiratory Culture - Wash, Lung, Left Lower Lobe [[id number]]  (Abnormal)  (Susceptibility) Collected: 01/14/25 3991    Lab Status: Final result Specimen: Wash from Lung, Left Lower Lobe Updated: 01/17/25 1221     Respiratory Culture Light growth (2+) Klebsiella pneumoniae ESBL     Comment:   Consider infectious disease consult.  Susceptibility results may not correlate to clinical outcomes.         Light growth (2+) Staphylococcus aureus, MRSA     Comment:   Considering site of infection and appropriate dosing, oxacillin (or cefoxitin) results can be applied to  cefazolin, nafcillin, ampicillin/sulbactam, dicloxacillin, amoxicillin/clavulanate, cephalexin, and cefpodoxime.  Methicillin resistant Staphylococcus aureus, Patient may be an isolation risk.         Rare growth Normal Respiratory Margo     Gram Stain Many (4+) WBCs per low power field      Rare (1+) Epithelial cells per low power field      Rare (1+) Gram positive cocci in pairs and clusters    Susceptibility        Klebsiella pneumoniae ESBL      KELSIE      Ciprofloxacin Resistant      Ertapenem Susceptible      Levofloxacin Resistant      Meropenem Susceptible      Tetracycline Resistant      Trimethoprim + Sulfamethoxazole Resistant                       Susceptibility        Staphylococcus aureus, MRSA      KELSIE      Clindamycin Resistant      Linezolid Susceptible      Oxacillin Resistant      Tetracycline Susceptible      Trimethoprim + Sulfamethoxazole Susceptible      Vancomycin Susceptible                       Susceptibility Comments       Klebsiella pneumoniae ESBL    With the exception of urinary-sourced infections, aminoglycosides should not be used as monotherapy.      Staphylococcus aureus, MRSA    This isolate is presumed to be clindamycin resistant based on detection of inducible clindamycin resistance.  Clindamycin may still be effective in some patients.  This isolate is presumed to be clindamycin resistant based on detection of inducible clindamycin resistance.  Clindamycin may still be effective in some patients.               Respiratory Culture - Sputum, ET Suction [[id number]]  (Abnormal) Collected: 01/14/25 3198    Lab Status: Final result Specimen: Sputum from ET Suction Updated: 01/17/25 1221     Respiratory Culture Light growth (2+) Klebsiella pneumoniae ESBL     Comment:   Consider infectious disease consult.  Susceptibility results may not correlate to clinical outcomes.         Light growth (2+) Staphylococcus aureus, MRSA     Comment:   Considering site of infection and appropriate  dosing, oxacillin (or cefoxitin) results can be applied to cefazolin, nafcillin, ampicillin/sulbactam, dicloxacillin, amoxicillin/clavulanate, cephalexin, and cefpodoxime.  Methicillin resistant Staphylococcus aureus, Patient may be an isolation risk.        Gram Stain Many (4+) WBCs per low power field      Rare (1+) Epithelial cells per low power field      Few (2+) Gram positive cocci in pairs, chains and clusters    Narrative:      Refer to LLL Wash culture for MICS.     Blood Culture - Blood, Blood, Arterial Line [[id number]]  (Abnormal) Collected: 01/14/25 1434    Lab Status: Final result Specimen: Blood, Arterial Line Updated: 01/17/25 0636     Blood Culture Staphylococcus, coagulase negative     Isolated from Anaerobic Bottle     Gram Stain Anaerobic Bottle Gram positive cocci in pairs and clusters    Narrative:      Probable contaminant requires clinical correlation, susceptibility not performed unless requested by physician.      Blood Culture ID, PCR - Blood, Blood, Arterial Line [[id number]]  (Abnormal) Collected: 01/14/25 1434    Lab Status: Final result Specimen: Blood, Arterial Line Updated: 01/16/25 0657     BCID, PCR Staph spp, not aureus or lugdunensis. Identification by BCID2 PCR.     BOTTLE TYPE Anaerobic Bottle    Blood Culture - Blood, Arm, Left [[id number]]  (Abnormal) Collected: 01/05/25 1305    Lab Status: Final result Specimen: Blood from Arm, Left Updated: 01/07/25 1100     Blood Culture Staphylococcus, coagulase negative     Isolated from Anaerobic Bottle     Gram Stain Anaerobic Bottle Gram positive cocci in clusters    Narrative:      Less than seven (7) mL's of blood was collected.  Insufficient quantity may yield false negative results.  Probable contaminant requires clinical correlation, susceptibility not performed unless requested by physician.    Blood Culture ID, PCR - Blood, Arm, Left [[id number]]  (Abnormal) Collected: 01/05/25 1305    Lab Status: Final result Specimen: Blood from  Arm, Left Updated: 01/06/25 1435     BCID, PCR Staph spp, not aureus or lugdunensis. Identification by BCID2 PCR.     BOTTLE TYPE Anaerobic Bottle    Respiratory Culture - Bronchial Wash, Lung, Left Lower Lobe [[id number]]  (Abnormal)  (Susceptibility) Collected: 01/04/25 0755    Lab Status: Final result Specimen: Bronchial Wash from Lung, Left Lower Lobe Updated: 01/06/25 0908     Respiratory Culture Scant growth (1+) Staphylococcus aureus, MRSA     Comment:   Methicillin resistant Staphylococcus aureus, Patient may be an isolation risk.         No Normal Respiratory Margo     Gram Stain Moderate (3+) WBCs seen      Rare (1+) Gram positive cocci in pairs and clusters    Susceptibility        Staphylococcus aureus, MRSA      KELSIE      Clindamycin Resistant      Linezolid Susceptible      Oxacillin Resistant      Tetracycline Susceptible      Trimethoprim + Sulfamethoxazole Susceptible      Vancomycin Susceptible                       Susceptibility Comments       Staphylococcus aureus, MRSA    This isolate is presumed to be clindamycin resistant based on detection of inducible clindamycin resistance.  Clindamycin may still be effective in some patients.  This isolate is presumed to be clindamycin resistant based on detection of inducible clindamycin resistance.  Clindamycin may still be effective in some patients.               Respiratory Culture - Sputum, ET Suction [[id number]]  (Abnormal)  (Susceptibility) Collected: 01/02/25 1212    Lab Status: Final result Specimen: Sputum from ET Suction Updated: 01/04/25 0939     Respiratory Culture Moderate growth (3+) Staphylococcus aureus, MRSA      No Normal Respiratory Margo     Gram Stain Many (4+) WBCs per low power field      Rare (1+) Epithelial cells per low power field      Many (4+) Gram positive cocci in pairs and clusters    Susceptibility        Staphylococcus aureus, MRSA      KELSIE      Clindamycin Resistant      Linezolid Susceptible      Oxacillin Resistant       Tetracycline Susceptible      Trimethoprim + Sulfamethoxazole Susceptible      Vancomycin Susceptible                       Susceptibility Comments       Staphylococcus aureus, MRSA    This isolate is presumed to be clindamycin resistant based on detection of inducible clindamycin resistance.  Clindamycin may still be effective in some patients.               MRSA Screen, PCR (Inpatient) - Swab, Nares [[id number]]  (Abnormal) Collected: 01/02/25 1246    Lab Status: Final result Specimen: Swab from Nares Updated: 01/02/25 1504     MRSA PCR Positive    Narrative:      The negative predictive value of this diagnostic test is high and should only be used to consider de-escalating anti-MRSA therapy. A positive result may indicate colonization with MRSA and must be correlated clinically.            SLP FEES - Fiberoptic Endo Eval Swallow    Result Date: 1/23/2025  This procedure was auto-finalized with no dictation required.    XR Humerus Right    Result Date: 1/23/2025  XR HUMERUS RIGHT Date of Exam: 1/23/2025 10:05 AM EST Indication: Pain Comparison: October 15, 2020 Findings: No acute fracture is identified. No focal osseous abnormality is identified. The joint spaces appear congruent. The soft tissues are unremarkable.     Impression: Impression: No acute osseous process identified. Electronically Signed: Neo Maradiaga MD  1/23/2025 11:14 AM EST  Workstation ID: [id number]    XR Forearm 2 View Right    Result Date: 1/23/2025  XR FOREARM 2 VW RIGHT Date of Exam: 1/23/2025 10:05 AM EST Indication: Pain Comparison: None available. Findings: No acute fracture is identified. No focal osseous abnormality is identified. The joint spaces appear congruent. The soft tissues are unremarkable.     Impression: Impression: No acute osseous process identified. Electronically Signed: Neo Maradiaga MD  1/23/2025 10:58 AM EST  Workstation ID: [id number]     Results for orders placed during the hospital encounter of 01/02/25    Adult  Transthoracic Echo Complete W/ Cont if Necessary Per Protocol    Interpretation Summary    Left ventricular systolic function is normal. Calculated left ventricular EF = 59.3% Left ventricular ejection fraction appears to be 61 - 65%.    Left ventricular wall thickness is consistent with borderline concentric hypertrophy.    Left ventricular diastolic function was normal.    Mild aortic valve stenosis is present.    Peak velocity of the flow distal to the aortic valve is 292 cm/s. Aortic valve mean pressure gradient is 20 mmHg.    Estimated right ventricular systolic pressure from tricuspid regurgitation is normal (<35 mmHg).      Current medications:  Scheduled Meds:amitriptyline, 25 mg, Nasogastric, Nightly  amLODIPine, 10 mg, Nasogastric, Daily  bumetanide, 1 mg, Intravenous, Daily  castor oil-balsam peru, 1 Application, Topical, Q12H  DULoxetine, 30 mg, Oral, Nightly  DULoxetine, 60 mg, Oral, Daily  enoxaparin, 120 mg, Subcutaneous, Q12H  gabapentin, 300 mg, Nasogastric, Q8H  insulin glargine, 35 Units, Subcutaneous, Q12H  insulin regular, 12 Units, Subcutaneous, Q6H  insulin regular, 4-24 Units, Subcutaneous, Q6H  lactobacillus acidophilus, 1 capsule, Nasogastric, Daily  metoclopramide, 10 mg, Intravenous, Q6H  metoprolol succinate XL, 50 mg, Oral, BID  nystatin, , Topical, Q12H      Continuous Infusions:   PRN Meds:.  acetaminophen    dextrose    glucagon (human recombinant)    HYDROcodone-acetaminophen    HYDROmorphone    ipratropium    ipratropium-albuterol    loperamide    Magnesium Standard Dose Replacement - Follow Nurse / BPA Driven Protocol    meclizine    midazolam    ondansetron    oxyCODONE    Polyvinyl Alcohol-Povidone PF    Potassium Replacement - Follow Nurse / BPA Driven Protocol    sodium chloride    sodium chloride    Assessment & Plan   Assessment & Plan     Active Hospital Problems    Diagnosis  POA    **Acute respiratory failure with hypercapnia [J96.02]  Yes    Sepsis [A41.9]  Yes    Type 2  diabetes mellitus with hyperglycemia [E11.65]  Yes    Hypothyroid [E03.9]  Yes    Factor 5 Leiden mutation, heterozygous [D68.51]  Yes    MARIAA (acute kidney injury) [N17.9]  Yes    Primary hypertension [I10]  Yes    PTSD (post-traumatic stress disorder) [F43.10]  Yes    History of DVT in adulthood [Z86.718]  Not Applicable      Resolved Hospital Problems   No resolved problems to display.        Brief Hospital Course to date:  Natalia Arzate is a 39 y.o. female with a history of HLD, Hypothyroidism, Afib, PE/DVT, Factor V Leiden mutation, and stroke who presented to [facility] with altered mental status. Labs there were significant for renal failure, hyperglycemia, and hypercapnic respiratory failure. Dx with  pneumonia and resp failure.  She was intubated and required the initiation of vasopressors. She was transferred to [facility] on 1/2/25 for ongoing care.    Acute hypoxic resp failure-improving  Severe sepsis with end organ failure- renal failure and resp failure  MRSA PNA--s/p vanc/linezolid  S/p intubation, extubated on 1/17.  ESBL Klebsiella PNA  --Pt underwent bronch on 1/14 with significant mucus plugging, post CXR with improvement in L lung aeration.  --Currently on NC--cont pulmonary toilet, wean O2 as tolerated.  --s/p merrem    Bacteremia- 1 bottle Gemella sanguinis--unclear significance  --s/p 10 days amp/unasyn.    MARIAA due to severe sepsis  S/P L nephrectomy in 2022  --Pt required CRRT ~ 1/8/25-1/11/25   --Renal fx improving, no need for further HD    Severe dysphagia  --cont ST.  --Currently with keofeed getting tube feeds.  Ok for 4oz puree 3x/day.  --Continue Zofran, reglan.  Will also try meclizine, as having some vertigo sx.     R arm pain  --xrays to forearm and humerus neg for fracture  --no swelling or redness, DVT less likely as pt is also on lovenox.    - Pain control. Add low dose dilaudid IV  - Pt is not able to fit in MRI  - Spoke with Radiology- CT with contrast of R upper extremity  ordered.    factor V Leiden mutation  History of PE/DVT  History of stroke  --cont home lovenox    DM  -- Continue Lantus and regular insulin while on tube feeds.    Hypertension  History A-fib  --Restarted home metoprolol, norvasc.  On lovenox    Diarrhea  --c diff neg.  Probiotic.  2/2 abx and tube feeds.  Imodium prn    Hx PTSD  --cont home cymbalta and elavil    Expected Discharge Location and Transportation: pending  Expected Discharge   Expected Discharge Date: 1/27/2025; Expected Discharge Time:      VTE Prophylaxis:  Pharmacologic & mechanical VTE prophylaxis orders are present.         AM-PAC 6 Clicks Score (PT): 8 (01/24/25 1150)    CODE STATUS:   Code Status and Medical Interventions: CPR (Attempt to Resuscitate); Full Support   Ordered at: 01/02/25 1952     Code Status (Patient has no pulse and is not breathing):    CPR (Attempt to Resuscitate)     Medical Interventions (Patient has pulse or is breathing):    Full Support       Diana Ghotra MD  01/24/25

## 2025-01-24 NOTE — PLAN OF CARE
Goal Outcome Evaluation:  Plan of Care Reviewed With: patient, significant other        Progress: improving  Outcome Evaluation: Pt demonstrated improved activity tolerance this date, participated in trunk control and hand strengthening activities while seated in chair. Pt's progress toward ADL goals continues to be limited by significant weakness and pain. Pt motivated to work with therapy, gave good effort despite reported pain. Continue to progress per OT POC.    Anticipated Discharge Disposition (OT): inpatient rehabilitation facility

## 2025-01-24 NOTE — CASE MANAGEMENT/SOCIAL WORK
Continued Stay Note  CRISTINA Hillman     Patient Name: Natalia Arzate  MRN: [id number]  Today's Date: 1/24/2025    Admit Date: 1/2/2025    Plan: Rehab   Discharge Plan       Row Name 01/24/25 1150       Plan    Plan Rehab    Plan Comments Per discussion in MDR, Pt c/o RUE pain. She is tolerating TF @ goal rate via NG. She failed FEES yesterday. She is receiving IV diuretics and is on 3L NC. [facility] Alvarado is following. CM will continue to follow for medical readiness.    Final Discharge Disposition Code 03 - skilled nursing facility (SNF)                   Discharge Codes    No documentation.                 Expected Discharge Date and Time       Expected Discharge Date Expected Discharge Time    Jan 27, 2025               Pallavi Donohue RN

## 2025-01-24 NOTE — PLAN OF CARE
Goal Outcome Evaluation:  Plan of Care Reviewed With: patient        Progress: improving  Outcome Evaluation: PT performed MIST to pt's L posterior/lateral flank wound this date. Pt's wound remains with mostly moist necrotic yellow slough and some aeras of thin brown eschar. PT able to debride small amount of necrotic tissue to help improve wound healing potential. Of note, pt's sacral wound appears fully resurfaced this date, so PT deferred MIST in this area, however continued with venelex and optifoam dressings to help improve skin integrity and reduce recurrance of wound. Pt due for next MIST/dressing change in 2-3 days.

## 2025-01-25 ENCOUNTER — APPOINTMENT (OUTPATIENT)
Dept: CARDIOLOGY | Facility: HOSPITAL | Age: 40
DRG: 870 | End: 2025-01-25
Payer: COMMERCIAL

## 2025-01-25 ENCOUNTER — APPOINTMENT (OUTPATIENT)
Dept: CT IMAGING | Facility: HOSPITAL | Age: 40
DRG: 870 | End: 2025-01-25
Payer: COMMERCIAL

## 2025-01-25 LAB
GLUCOSE BLDC GLUCOMTR-MCNC: 233 MG/DL (ref 70–130)
GLUCOSE BLDC GLUCOMTR-MCNC: 237 MG/DL (ref 70–130)
GLUCOSE BLDC GLUCOMTR-MCNC: 241 MG/DL (ref 70–130)
GLUCOSE BLDC GLUCOMTR-MCNC: 242 MG/DL (ref 70–130)
GLUCOSE BLDC GLUCOMTR-MCNC: 256 MG/DL (ref 70–130)
MAGNESIUM SERPL-MCNC: 1.8 MG/DL (ref 1.6–2.6)

## 2025-01-25 PROCEDURE — 99232 SBSQ HOSP IP/OBS MODERATE 35: CPT | Performed by: INTERNAL MEDICINE

## 2025-01-25 PROCEDURE — 25010000002 HYDROMORPHONE PER 4 MG: Performed by: PEDIATRICS

## 2025-01-25 PROCEDURE — 25010000002 BUMETANIDE PER 0.5 MG: Performed by: INTERNAL MEDICINE

## 2025-01-25 PROCEDURE — 83735 ASSAY OF MAGNESIUM: CPT | Performed by: PEDIATRICS

## 2025-01-25 PROCEDURE — 63710000001 INSULIN REGULAR HUMAN PER 5 UNITS: Performed by: PEDIATRICS

## 2025-01-25 PROCEDURE — 93971 EXTREMITY STUDY: CPT

## 2025-01-25 PROCEDURE — 82948 REAGENT STRIP/BLOOD GLUCOSE: CPT

## 2025-01-25 PROCEDURE — 25010000002 ENOXAPARIN PER 10 MG: Performed by: INTERNAL MEDICINE

## 2025-01-25 PROCEDURE — 93971 EXTREMITY STUDY: CPT | Performed by: INTERNAL MEDICINE

## 2025-01-25 PROCEDURE — 25010000002 MAGNESIUM SULFATE 2 GM/50ML SOLUTION: Performed by: PEDIATRICS

## 2025-01-25 PROCEDURE — 63710000001 INSULIN REGULAR HUMAN PER 5 UNITS: Performed by: INTERNAL MEDICINE

## 2025-01-25 PROCEDURE — 63710000001 INSULIN GLARGINE PER 5 UNITS: Performed by: PEDIATRICS

## 2025-01-25 PROCEDURE — 25010000002 METOCLOPRAMIDE PER 10 MG: Performed by: PEDIATRICS

## 2025-01-25 PROCEDURE — 25010000002 HYDROMORPHONE PER 4 MG: Performed by: INTERNAL MEDICINE

## 2025-01-25 RX ORDER — CHOLESTYRAMINE LIGHT 4 G/5.7G
1 POWDER, FOR SUSPENSION ORAL EVERY 8 HOURS SCHEDULED
Status: DISCONTINUED | OUTPATIENT
Start: 2025-01-25 | End: 2025-01-30

## 2025-01-25 RX ORDER — HYDROMORPHONE HYDROCHLORIDE 1 MG/ML
0.5 INJECTION, SOLUTION INTRAMUSCULAR; INTRAVENOUS; SUBCUTANEOUS EVERY 4 HOURS PRN
Status: DISCONTINUED | OUTPATIENT
Start: 2025-01-25 | End: 2025-01-31

## 2025-01-25 RX ADMIN — ENOXAPARIN SODIUM 120 MG: 120 INJECTION SUBCUTANEOUS at 11:14

## 2025-01-25 RX ADMIN — METOPROLOL SUCCINATE 50 MG: 50 TABLET, EXTENDED RELEASE ORAL at 20:00

## 2025-01-25 RX ADMIN — INSULIN HUMAN 8 UNITS: 100 INJECTION, SOLUTION PARENTERAL at 11:14

## 2025-01-25 RX ADMIN — INSULIN HUMAN 12 UNITS: 100 INJECTION, SOLUTION PARENTERAL at 11:14

## 2025-01-25 RX ADMIN — INSULIN HUMAN 12 UNITS: 100 INJECTION, SOLUTION PARENTERAL at 17:53

## 2025-01-25 RX ADMIN — DULOXETINE HYDROCHLORIDE 30 MG: 30 CAPSULE, DELAYED RELEASE ORAL at 20:00

## 2025-01-25 RX ADMIN — METOCLOPRAMIDE HYDROCHLORIDE 10 MG: 10 INJECTION, SOLUTION INTRAMUSCULAR; INTRAVENOUS at 05:04

## 2025-01-25 RX ADMIN — METOCLOPRAMIDE HYDROCHLORIDE 10 MG: 10 INJECTION, SOLUTION INTRAMUSCULAR; INTRAVENOUS at 22:47

## 2025-01-25 RX ADMIN — HYDROCODONE BITARTRATE AND ACETAMINOPHEN 1 TABLET: 5; 325 TABLET ORAL at 22:47

## 2025-01-25 RX ADMIN — GABAPENTIN 300 MG: 300 CAPSULE ORAL at 14:26

## 2025-01-25 RX ADMIN — NYSTATIN: 100000 POWDER TOPICAL at 08:25

## 2025-01-25 RX ADMIN — NYSTATIN 1 APPLICATION: 100000 POWDER TOPICAL at 20:03

## 2025-01-25 RX ADMIN — METOCLOPRAMIDE HYDROCHLORIDE 10 MG: 10 INJECTION, SOLUTION INTRAMUSCULAR; INTRAVENOUS at 17:53

## 2025-01-25 RX ADMIN — LOPERAMIDE HYDROCHLORIDE 2 MG: 2 CAPSULE ORAL at 11:14

## 2025-01-25 RX ADMIN — HYDROMORPHONE HYDROCHLORIDE 0.25 MG: 1 INJECTION, SOLUTION INTRAMUSCULAR; INTRAVENOUS; SUBCUTANEOUS at 00:55

## 2025-01-25 RX ADMIN — INSULIN HUMAN 12 UNITS: 100 INJECTION, SOLUTION PARENTERAL at 06:01

## 2025-01-25 RX ADMIN — LOPERAMIDE HYDROCHLORIDE 2 MG: 2 CAPSULE ORAL at 22:47

## 2025-01-25 RX ADMIN — BUMETANIDE 1 MG: 0.25 INJECTION INTRAMUSCULAR; INTRAVENOUS at 08:24

## 2025-01-25 RX ADMIN — HYDROMORPHONE HYDROCHLORIDE 0.25 MG: 1 INJECTION, SOLUTION INTRAMUSCULAR; INTRAVENOUS; SUBCUTANEOUS at 04:01

## 2025-01-25 RX ADMIN — INSULIN HUMAN 8 UNITS: 100 INJECTION, SOLUTION PARENTERAL at 00:54

## 2025-01-25 RX ADMIN — INSULIN HUMAN 12 UNITS: 100 INJECTION, SOLUTION PARENTERAL at 00:54

## 2025-01-25 RX ADMIN — METOPROLOL SUCCINATE 50 MG: 50 TABLET, EXTENDED RELEASE ORAL at 08:25

## 2025-01-25 RX ADMIN — Medication 1 APPLICATION: at 08:25

## 2025-01-25 RX ADMIN — CHOLESTYRAMINE 4 G: 4 POWDER, FOR SUSPENSION ORAL at 22:47

## 2025-01-25 RX ADMIN — GABAPENTIN 300 MG: 300 CAPSULE ORAL at 22:47

## 2025-01-25 RX ADMIN — AMLODIPINE BESYLATE 10 MG: 10 TABLET ORAL at 08:25

## 2025-01-25 RX ADMIN — METOCLOPRAMIDE HYDROCHLORIDE 10 MG: 10 INJECTION, SOLUTION INTRAMUSCULAR; INTRAVENOUS at 10:36

## 2025-01-25 RX ADMIN — HYDROMORPHONE HYDROCHLORIDE 0.5 MG: 1 INJECTION, SOLUTION INTRAMUSCULAR; INTRAVENOUS; SUBCUTANEOUS at 12:39

## 2025-01-25 RX ADMIN — HYDROMORPHONE HYDROCHLORIDE 0.25 MG: 1 INJECTION, SOLUTION INTRAMUSCULAR; INTRAVENOUS; SUBCUTANEOUS at 10:36

## 2025-01-25 RX ADMIN — HYDROMORPHONE HYDROCHLORIDE 0.5 MG: 1 INJECTION, SOLUTION INTRAMUSCULAR; INTRAVENOUS; SUBCUTANEOUS at 15:11

## 2025-01-25 RX ADMIN — INSULIN GLARGINE 35 UNITS: 100 INJECTION, SOLUTION SUBCUTANEOUS at 08:25

## 2025-01-25 RX ADMIN — GABAPENTIN 300 MG: 300 CAPSULE ORAL at 06:00

## 2025-01-25 RX ADMIN — Medication 1 CAPSULE: at 08:25

## 2025-01-25 RX ADMIN — INSULIN HUMAN 8 UNITS: 100 INJECTION, SOLUTION PARENTERAL at 17:53

## 2025-01-25 RX ADMIN — MAGNESIUM SULFATE HEPTAHYDRATE 2 G: 2 INJECTION, SOLUTION INTRAVENOUS at 00:55

## 2025-01-25 RX ADMIN — OXYCODONE HYDROCHLORIDE 5 MG: 5 SOLUTION ORAL at 14:26

## 2025-01-25 RX ADMIN — HYDROMORPHONE HYDROCHLORIDE 0.25 MG: 1 INJECTION, SOLUTION INTRAMUSCULAR; INTRAVENOUS; SUBCUTANEOUS at 06:01

## 2025-01-25 RX ADMIN — INSULIN GLARGINE 35 UNITS: 100 INJECTION, SOLUTION SUBCUTANEOUS at 20:01

## 2025-01-25 RX ADMIN — AMITRIPTYLINE HYDROCHLORIDE 25 MG: 25 TABLET, FILM COATED ORAL at 20:00

## 2025-01-25 RX ADMIN — OXYCODONE HYDROCHLORIDE 5 MG: 5 SOLUTION ORAL at 18:46

## 2025-01-25 RX ADMIN — HYDROMORPHONE HYDROCHLORIDE 0.5 MG: 1 INJECTION, SOLUTION INTRAMUSCULAR; INTRAVENOUS; SUBCUTANEOUS at 20:00

## 2025-01-25 RX ADMIN — HYDROMORPHONE HYDROCHLORIDE 0.25 MG: 1 INJECTION, SOLUTION INTRAMUSCULAR; INTRAVENOUS; SUBCUTANEOUS at 08:24

## 2025-01-25 RX ADMIN — Medication 1 APPLICATION: at 20:02

## 2025-01-25 NOTE — PROGRESS NOTES
[facility] Medicine Services  PROGRESS NOTE    Patient Name: Natalia Arzate  : 1985  MRN: [id number]    Date of Admission: 2025  Primary Care Physician: Bladimir Calle PA-C    Subjective   Subjective     CC:  Resp failure    HPI:  Patient was seen and examined this morning.  Continues to complain of right upper extremity pain.  CT right upper extremity pending.  Per nursing report, continues to have watery diarrhea.  Tolerating tube feed well.    Objective   Objective     Vital Signs:   Temp:  [97.8 °F (36.6 °C)-98.6 °F (37 °C)] 98 °F (36.7 °C)  Heart Rate:  [] 109  Resp:  [18-20] 18  BP: (129-154)/() 129/86  Flow (L/min) (Oxygen Therapy):  [3-4] 4     Physical Exam:  General: Chronically ill looking, not in distress, conversant and cooperative  Head: Atraumatic and normocephalic  Eyes: No Icterus. No pallor  Ears:  Ears appear intact with no abnormalities noted  Throat: No oral lesions, no thrush  Neck: Supple, trachea midline  Lungs: Clear to auscultation bilaterally, equal air entry, no wheezing or crackles  Heart:  Normal S1 and S2, no murmur, no gallop, No JVD, no lower extremity swelling  Abdomen:  Soft, no tenderness, no organomegaly, normal bowel sounds, no organomegaly  Extremities: pulses equal bilaterally  Skin: No bleeding, bruising or rash, normal skin turgor and elasticity  Neurologic: Cranial nerves appear intact with no evidence of facial asymmetry, normal motor and sensory functions in all 4 extremities  Psych: Alert and oriented x 3, normal mood neurologic: Oriented x 3,  Cranial Nerves grossly intact to confrontation, speech clear  Skin: No rashes      Results Reviewed:  LAB RESULTS:      Lab 25  1603 25  1201 25  1540 25  0658 25  2045 25  1810 25  0431   WBC  --  3.90 4.79 4.82  --   --  5.15   HEMOGLOBIN  --  9.8* 9.6* 8.9*  --   --  8.1*   HEMATOCRIT  --  31.2* 30.9* 28.9*  --   --  26.7*    PLATELETS  --  195 215 210  --   --  184   NEUTROS ABS  --  2.43  --   --   --   --  3.34   IMMATURE GRANS (ABS)  --  0.04  --   --   --   --  0.07*   LYMPHS ABS  --  0.53*  --   --   --   --  1.03   MONOS ABS  --  0.65  --   --   --   --  0.54   EOS ABS  --  0.22  --   --   --   --  0.14   MCV  --  92.6 93.6 93.5  --   --  96.4   CRP 2.18*  --   --   --   --   --   --    HEPARIN ANTI-XA  --   --  0.81 0.52 0.65 0.10* 0.61         Lab 01/24/25  1603 01/23/25  0453 01/22/25  1201 01/21/25  1540 01/20/25  0658 01/19/25  0431   SODIUM 132*  --  131* 133* 131* 134*   POTASSIUM 4.5  --  4.0 3.9 3.7 3.9   CHLORIDE 90*  --  90* 90* 89* 93*   CO2 28.0  --  29.0 30.0* 30.0* 30.0*   ANION GAP 14.0  --  12.0 13.0 12.0 11.0   BUN 22*  --  13 12 14 21*   CREATININE 0.52*  --  0.54* 0.58 0.61 0.66   EGFR 122.1  --  121.0 119.0 117.5 115.3   GLUCOSE 231*  --  215* 207* 194* 164*   CALCIUM 10.4  --  9.9 9.8 9.5 9.6   MAGNESIUM 1.2* 2.3 1.0* 1.8 1.3* 1.5*   PHOSPHORUS 5.0*  --  4.2 4.2  --   --          Lab 01/24/25  1603   TOTAL PROTEIN 8.1   ALBUMIN 3.9   GLOBULIN 4.2   ALT (SGPT) 35*   AST (SGOT) 70*   BILIRUBIN 0.5   ALK PHOS 134*             Lab 01/24/25  1603   CHOLESTEROL 264*   TRIGLYCERIDES 739*                 Brief Urine Lab Results  (Last result in the past 365 days)        Color   Clarity   Blood   Leuk Est   Nitrite   Protein   CREAT   Urine HCG        01/02/25 1213             52.3         01/02/25 1213 Yellow   Cloudy   Moderate (2+)   Negative   Negative   100 mg/dL (2+)                   Microbiology Results Abnormal       Procedure Component Value - Date/Time    Respiratory Culture - Wash, Lung, Left Lower Lobe [[id number]]  (Abnormal)  (Susceptibility) Collected: 01/14/25 1608    Lab Status: Final result Specimen: Wash from Lung, Left Lower Lobe Updated: 01/17/25 1221     Respiratory Culture Light growth (2+) Klebsiella pneumoniae ESBL     Comment:   Consider infectious disease consult.  Susceptibility results  may not correlate to clinical outcomes.         Light growth (2+) Staphylococcus aureus, MRSA     Comment:   Considering site of infection and appropriate dosing, oxacillin (or cefoxitin) results can be applied to cefazolin, nafcillin, ampicillin/sulbactam, dicloxacillin, amoxicillin/clavulanate, cephalexin, and cefpodoxime.  Methicillin resistant Staphylococcus aureus, Patient may be an isolation risk.         Rare growth Normal Respiratory Margo     Gram Stain Many (4+) WBCs per low power field      Rare (1+) Epithelial cells per low power field      Rare (1+) Gram positive cocci in pairs and clusters    Susceptibility        Klebsiella pneumoniae ESBL      KELSIE      Ciprofloxacin Resistant      Ertapenem Susceptible      Levofloxacin Resistant      Meropenem Susceptible      Tetracycline Resistant      Trimethoprim + Sulfamethoxazole Resistant                       Susceptibility        Staphylococcus aureus, MRSA      KELSIE      Clindamycin Resistant      Linezolid Susceptible      Oxacillin Resistant      Tetracycline Susceptible      Trimethoprim + Sulfamethoxazole Susceptible      Vancomycin Susceptible                       Susceptibility Comments       Klebsiella pneumoniae ESBL    With the exception of urinary-sourced infections, aminoglycosides should not be used as monotherapy.      Staphylococcus aureus, MRSA    This isolate is presumed to be clindamycin resistant based on detection of inducible clindamycin resistance.  Clindamycin may still be effective in some patients.  This isolate is presumed to be clindamycin resistant based on detection of inducible clindamycin resistance.  Clindamycin may still be effective in some patients.               Respiratory Culture - Sputum, ET Suction [[id number]]  (Abnormal) Collected: 01/14/25 4743    Lab Status: Final result Specimen: Sputum from ET Suction Updated: 01/17/25 1221     Respiratory Culture Light growth (2+) Klebsiella pneumoniae ESBL     Comment:    Consider infectious disease consult.  Susceptibility results may not correlate to clinical outcomes.         Light growth (2+) Staphylococcus aureus, MRSA     Comment:   Considering site of infection and appropriate dosing, oxacillin (or cefoxitin) results can be applied to cefazolin, nafcillin, ampicillin/sulbactam, dicloxacillin, amoxicillin/clavulanate, cephalexin, and cefpodoxime.  Methicillin resistant Staphylococcus aureus, Patient may be an isolation risk.        Gram Stain Many (4+) WBCs per low power field      Rare (1+) Epithelial cells per low power field      Few (2+) Gram positive cocci in pairs, chains and clusters    Narrative:      Refer to LLL Wash culture for MICS.     Blood Culture - Blood, Blood, Arterial Line [[id number]]  (Abnormal) Collected: 01/14/25 1434    Lab Status: Final result Specimen: Blood, Arterial Line Updated: 01/17/25 0636     Blood Culture Staphylococcus, coagulase negative     Isolated from Anaerobic Bottle     Gram Stain Anaerobic Bottle Gram positive cocci in pairs and clusters    Narrative:      Probable contaminant requires clinical correlation, susceptibility not performed unless requested by physician.      Blood Culture ID, PCR - Blood, Blood, Arterial Line [[id number]]  (Abnormal) Collected: 01/14/25 1434    Lab Status: Final result Specimen: Blood, Arterial Line Updated: 01/16/25 0657     BCID, PCR Staph spp, not aureus or lugdunensis. Identification by BCID2 PCR.     BOTTLE TYPE Anaerobic Bottle    Blood Culture - Blood, Arm, Left [[id number]]  (Abnormal) Collected: 01/05/25 1305    Lab Status: Final result Specimen: Blood from Arm, Left Updated: 01/07/25 1100     Blood Culture Staphylococcus, coagulase negative     Isolated from Anaerobic Bottle     Gram Stain Anaerobic Bottle Gram positive cocci in clusters    Narrative:      Less than seven (7) mL's of blood was collected.  Insufficient quantity may yield false negative results.  Probable contaminant requires  clinical correlation, susceptibility not performed unless requested by physician.    Blood Culture ID, PCR - Blood, Arm, Left [[id number]]  (Abnormal) Collected: 01/05/25 1305    Lab Status: Final result Specimen: Blood from Arm, Left Updated: 01/06/25 1435     BCID, PCR Staph spp, not aureus or lugdunensis. Identification by BCID2 PCR.     BOTTLE TYPE Anaerobic Bottle    Respiratory Culture - Bronchial Wash, Lung, Left Lower Lobe [[id number]]  (Abnormal)  (Susceptibility) Collected: 01/04/25 0755    Lab Status: Final result Specimen: Bronchial Wash from Lung, Left Lower Lobe Updated: 01/06/25 0908     Respiratory Culture Scant growth (1+) Staphylococcus aureus, MRSA     Comment:   Methicillin resistant Staphylococcus aureus, Patient may be an isolation risk.         No Normal Respiratory Margo     Gram Stain Moderate (3+) WBCs seen      Rare (1+) Gram positive cocci in pairs and clusters    Susceptibility        Staphylococcus aureus, MRSA      KELSIE      Clindamycin Resistant      Linezolid Susceptible      Oxacillin Resistant      Tetracycline Susceptible      Trimethoprim + Sulfamethoxazole Susceptible      Vancomycin Susceptible                       Susceptibility Comments       Staphylococcus aureus, MRSA    This isolate is presumed to be clindamycin resistant based on detection of inducible clindamycin resistance.  Clindamycin may still be effective in some patients.  This isolate is presumed to be clindamycin resistant based on detection of inducible clindamycin resistance.  Clindamycin may still be effective in some patients.               Respiratory Culture - Sputum, ET Suction [[id number]]  (Abnormal)  (Susceptibility) Collected: 01/02/25 1212    Lab Status: Final result Specimen: Sputum from ET Suction Updated: 01/04/25 0939     Respiratory Culture Moderate growth (3+) Staphylococcus aureus, MRSA      No Normal Respiratory Margo     Gram Stain Many (4+) WBCs per low power field      Rare (1+) Epithelial  cells per low power field      Many (4+) Gram positive cocci in pairs and clusters    Susceptibility        Staphylococcus aureus, MRSA      KELSIE      Clindamycin Resistant      Linezolid Susceptible      Oxacillin Resistant      Tetracycline Susceptible      Trimethoprim + Sulfamethoxazole Susceptible      Vancomycin Susceptible                       Susceptibility Comments       Staphylococcus aureus, MRSA    This isolate is presumed to be clindamycin resistant based on detection of inducible clindamycin resistance.  Clindamycin may still be effective in some patients.               MRSA Screen, PCR (Inpatient) - Swab, Nares [[id number]]  (Abnormal) Collected: 01/02/25 1246    Lab Status: Final result Specimen: Swab from Nares Updated: 01/02/25 1504     MRSA PCR Positive    Narrative:      The negative predictive value of this diagnostic test is high and should only be used to consider de-escalating anti-MRSA therapy. A positive result may indicate colonization with MRSA and must be correlated clinically.            SLP FEES - Fiberoptic Endo Eval Swallow    Result Date: 1/23/2025  This procedure was auto-finalized with no dictation required.    XR Humerus Right    Result Date: 1/23/2025  XR HUMERUS RIGHT Date of Exam: 1/23/2025 10:05 AM EST Indication: Pain Comparison: October 15, 2020 Findings: No acute fracture is identified. No focal osseous abnormality is identified. The joint spaces appear congruent. The soft tissues are unremarkable.     Impression: Impression: No acute osseous process identified. Electronically Signed: Neo Maradiaga MD  1/23/2025 11:14 AM EST  Workstation ID: [id number]    XR Forearm 2 View Right    Result Date: 1/23/2025  XR FOREARM 2 VW RIGHT Date of Exam: 1/23/2025 10:05 AM EST Indication: Pain Comparison: None available. Findings: No acute fracture is identified. No focal osseous abnormality is identified. The joint spaces appear congruent. The soft tissues are unremarkable.      Impression: Impression: No acute osseous process identified. Electronically Signed: Neo Maradiaga MD  1/23/2025 10:58 AM EST  Workstation ID: [id number]     Results for orders placed during the hospital encounter of 01/02/25    Adult Transthoracic Echo Complete W/ Cont if Necessary Per Protocol    Interpretation Summary    Left ventricular systolic function is normal. Calculated left ventricular EF = 59.3% Left ventricular ejection fraction appears to be 61 - 65%.    Left ventricular wall thickness is consistent with borderline concentric hypertrophy.    Left ventricular diastolic function was normal.    Mild aortic valve stenosis is present.    Peak velocity of the flow distal to the aortic valve is 292 cm/s. Aortic valve mean pressure gradient is 20 mmHg.    Estimated right ventricular systolic pressure from tricuspid regurgitation is normal (<35 mmHg).      Current medications:  Scheduled Meds:amitriptyline, 25 mg, Nasogastric, Nightly  amLODIPine, 10 mg, Nasogastric, Daily  bumetanide, 1 mg, Intravenous, Daily  castor oil-balsam peru, 1 Application, Topical, Q12H  DULoxetine, 30 mg, Oral, Nightly  DULoxetine, 60 mg, Oral, Daily  enoxaparin, 120 mg, Subcutaneous, Q12H  gabapentin, 300 mg, Nasogastric, Q8H  insulin glargine, 35 Units, Subcutaneous, Q12H  insulin regular, 12 Units, Subcutaneous, Q6H  insulin regular, 4-24 Units, Subcutaneous, Q6H  lactobacillus acidophilus, 1 capsule, Nasogastric, Daily  metoclopramide, 10 mg, Intravenous, Q6H  metoprolol succinate XL, 50 mg, Oral, BID  nystatin, , Topical, Q12H      Continuous Infusions:   PRN Meds:.  acetaminophen    dextrose    glucagon (human recombinant)    HYDROcodone-acetaminophen    HYDROmorphone    ipratropium    ipratropium-albuterol    loperamide    Magnesium Standard Dose Replacement - Follow Nurse / BPA Driven Protocol    meclizine    midazolam    ondansetron    oxyCODONE    Polyvinyl Alcohol-Povidone PF    Potassium Replacement - Follow Nurse /  BPA Driven Protocol    sodium chloride    sodium chloride    Assessment & Plan   Assessment & Plan     Active Hospital Problems    Diagnosis  POA    **Acute respiratory failure with hypercapnia [J96.02]  Yes    Sepsis [A41.9]  Yes    Type 2 diabetes mellitus with hyperglycemia [E11.65]  Yes    Hypothyroid [E03.9]  Yes    Factor 5 Leiden mutation, heterozygous [D68.51]  Yes    MARIAA (acute kidney injury) [N17.9]  Yes    Primary hypertension [I10]  Yes    PTSD (post-traumatic stress disorder) [F43.10]  Yes    History of DVT in adulthood [Z86.718]  Not Applicable      Resolved Hospital Problems   No resolved problems to display.        Brief Hospital Course to date:  Natalia Arzate is a 39 y.o. female with a history of HLD, Hypothyroidism, Afib, PE/DVT, Factor V Leiden mutation, and stroke who presented to [facility] with altered mental status. Labs there were significant for renal failure, hyperglycemia, and hypercapnic respiratory failure. Dx with  pneumonia and resp failure.  She was intubated and required the initiation of vasopressors. She was transferred to [facility] on 1/2/25 for ongoing care.    Acute hypoxic resp failure, improving  Severe sepsis with end organ failure- renal failure and resp failure, improved  MRSA PNA--s/p vanc/linezolid  ESBL Klebsiella PNA  S/p intubation, extubated on 1/17  Pt underwent bronch on 1/14 with significant mucus plugging, post CXR with improvement in L lung aeration.  S/P merrem, vancomycin and linezolid for ESBL pneumonia and MRSA pneumonia respectively  Currently on NC--cont pulmonary toilet, wean O2 as tolerated    Gemella sanguinis Bacteremia  1 bottle Gemella sanguinis--unclear significance  S/p 10 days amp/unasyn.    MARIAA due to severe sepsis  S/P L nephrectomy in 2022  Pt required CRRT ~ 1/8/25-1/11/25   Renal function improving, no need for further HD    Severe dysphagia  Currently with keofeed getting tube feeds.  Ok for 4oz puree 3x/day.  Continue Zofran, reglan.  Will also  try meclizine, as having some vertigo sx.  Speech therapy following    Right arm pain  X-rays to forearm and humerus neg for fracture  PRN dilaudid IV  Pt is not able to fit in MRI  CT with contrast of R upper extremity and right upper extremity ultrasound pending.    Factor V Leiden mutation  History of PE/DVT  History of stroke  Continue home lovenox    DM  A1c 9.1%  Continue Lantus and regular insulin while on tube feeds.    Hypertension  History A-fib  Restarted home metoprolol, norvasc  Continue lovenox    Diarrhea  C diff neg  Continue probiotic  Likely 2/2 abx and tube feeds  Continue Imodium prn  Add cholestyramine    History of PTSD  Continue home cymbalta and elavil    Expected Discharge Location and Transportation: To be determined  Expected Discharge   Expected Discharge Date: 1/27/2025; Expected Discharge Time:      VTE Prophylaxis:  Pharmacologic & mechanical VTE prophylaxis orders are present.         AM-PAC 6 Clicks Score (PT): 8 (01/24/25 2000)    CODE STATUS:   Code Status and Medical Interventions: CPR (Attempt to Resuscitate); Full Support   Ordered at: 01/02/25 1952     Code Status (Patient has no pulse and is not breathing):    CPR (Attempt to Resuscitate)     Medical Interventions (Patient has pulse or is breathing):    Full Support       Soo Casas MD  01/25/25

## 2025-01-25 NOTE — PLAN OF CARE
Problem: Adult Inpatient Plan of Care  Goal: Absence of Hospital-Acquired Illness or Injury  Intervention: Identify and Manage Fall Risk  Recent Flowsheet Documentation  Taken 1/25/2025 0600 by Marly Armas RN  Safety Promotion/Fall Prevention:   activity supervised   assistive device/personal items within reach   clutter free environment maintained   elopement precautions   fall prevention program maintained   room organization consistent   safety round/check completed   toileting scheduled   nonskid shoes/slippers when out of bed  Taken 1/25/2025 0401 by Marly Armas RN  Safety Promotion/Fall Prevention:   activity supervised   assistive device/personal items within reach   clutter free environment maintained   elopement precautions   fall prevention program maintained   room organization consistent   safety round/check completed   toileting scheduled   nonskid shoes/slippers when out of bed  Taken 1/25/2025 0200 by Marly Armas RN  Safety Promotion/Fall Prevention:   activity supervised   assistive device/personal items within reach   clutter free environment maintained   elopement precautions   fall prevention program maintained   room organization consistent   safety round/check completed   toileting scheduled   nonskid shoes/slippers when out of bed  Taken 1/25/2025 0055 by Marly Armas RN  Safety Promotion/Fall Prevention:   activity supervised   assistive device/personal items within reach   clutter free environment maintained   elopement precautions   fall prevention program maintained   room organization consistent   safety round/check completed   toileting scheduled   nonskid shoes/slippers when out of bed  Taken 1/24/2025 2212 by Marly Armas RN  Safety Promotion/Fall Prevention:   activity supervised   assistive device/personal items within reach   clutter free environment maintained   elopement precautions   fall prevention program maintained   room organization consistent   safety  round/check completed   toileting scheduled   nonskid shoes/slippers when out of bed  Taken 1/24/2025 2000 by Marly Armas RN  Safety Promotion/Fall Prevention:   activity supervised   assistive device/personal items within reach   clutter free environment maintained   elopement precautions   fall prevention program maintained   room organization consistent   safety round/check completed   toileting scheduled   nonskid shoes/slippers when out of bed  Intervention: Prevent Skin Injury  Recent Flowsheet Documentation  Taken 1/25/2025 0600 by Marly Armas RN  Body Position: side-lying  Skin Protection:   incontinence pads utilized   transparent dressing maintained  Taken 1/25/2025 0401 by Marly Armas RN  Body Position:   turned   side-lying   left  Skin Protection:   incontinence pads utilized   transparent dressing maintained  Taken 1/25/2025 0200 by Marly Armas RN  Body Position:   turned   right   side-lying  Skin Protection:   incontinence pads utilized   transparent dressing maintained  Taken 1/25/2025 0055 by Marly Armas RN  Body Position:   turned   left  Skin Protection:   incontinence pads utilized   transparent dressing maintained  Taken 1/24/2025 2212 by Marly Armas RN  Body Position:   turned   right   side-lying  Skin Protection:   incontinence pads utilized   transparent dressing maintained  Taken 1/24/2025 2000 by Marly Armas RN  Body Position: position changed independently  Skin Protection:   incontinence pads utilized   silicone foam dressing in place   transparent dressing maintained  Intervention: Prevent Infection  Recent Flowsheet Documentation  Taken 1/25/2025 0600 by Marly Armas RN  Infection Prevention:   single patient room provided   rest/sleep promoted   visitors restricted/screened  Taken 1/25/2025 0401 by Marly Armas RN  Infection Prevention:   single patient room provided   rest/sleep promoted   visitors restricted/screened  Taken 1/25/2025 0200 by  Marly Armas RN  Infection Prevention:   single patient room provided   rest/sleep promoted   visitors restricted/screened  Taken 1/25/2025 0055 by Marly Armas RN  Infection Prevention:   single patient room provided   rest/sleep promoted   visitors restricted/screened  Taken 1/24/2025 2212 by Marly Armas RN  Infection Prevention:   single patient room provided   rest/sleep promoted   visitors restricted/screened  Taken 1/24/2025 2000 by Marly Armas RN  Infection Prevention:   single patient room provided   rest/sleep promoted   visitors restricted/screened  Goal: Optimal Comfort and Wellbeing  Intervention: Provide Person-Centered Care  Recent Flowsheet Documentation  Taken 1/25/2025 0401 by Marly Armas RN  Trust Relationship/Rapport:   care explained   choices provided   empathic listening provided   emotional support provided   thoughts/feelings acknowledged   questions answered   questions encouraged   reassurance provided  Taken 1/25/2025 0200 by Marly Armas RN  Trust Relationship/Rapport:   care explained   choices provided   empathic listening provided   emotional support provided   thoughts/feelings acknowledged   questions answered   questions encouraged   reassurance provided  Taken 1/25/2025 0055 by Marly Armas RN  Trust Relationship/Rapport:   care explained   choices provided   empathic listening provided   emotional support provided   thoughts/feelings acknowledged   questions answered   questions encouraged   reassurance provided  Taken 1/24/2025 2212 by Marly Armas RN  Trust Relationship/Rapport:   care explained   choices provided   empathic listening provided   emotional support provided   thoughts/feelings acknowledged   questions answered   questions encouraged   reassurance provided  Taken 1/24/2025 2000 by Marly Armas RN  Trust Relationship/Rapport:   care explained   choices provided   empathic listening provided   emotional support provided    thoughts/feelings acknowledged   questions answered   questions encouraged   reassurance provided     Problem: Skin Injury Risk Increased  Goal: Skin Health and Integrity  Intervention: Optimize Skin Protection  Recent Flowsheet Documentation  Taken 1/25/2025 0600 by Marly Armas, RN  Activity Management: activity minimized  Pressure Reduction Techniques:   weight shift assistance provided   pressure points protected  Head of Bed (HOB) Positioning: HOB at 30-45 degrees  Pressure Reduction Devices: specialty bed utilized  Skin Protection:   incontinence pads utilized   transparent dressing maintained  Taken 1/25/2025 0401 by Marly Armas, RN  Activity Management: activity minimized  Pressure Reduction Techniques:   weight shift assistance provided   pressure points protected  Head of Bed (HOB) Positioning: HOB at 30-45 degrees  Pressure Reduction Devices: specialty bed utilized  Skin Protection:   incontinence pads utilized   transparent dressing maintained  Taken 1/25/2025 0200 by Malry Armas, RN  Activity Management: activity minimized  Pressure Reduction Techniques:   weight shift assistance provided   pressure points protected  Head of Bed (HOB) Positioning: HOB at 30-45 degrees  Pressure Reduction Devices: specialty bed utilized  Skin Protection:   incontinence pads utilized   transparent dressing maintained  Taken 1/25/2025 0055 by Marly Armas, RN  Activity Management: activity minimized  Pressure Reduction Techniques:   weight shift assistance provided   pressure points protected  Head of Bed (HOB) Positioning: HOB at 30-45 degrees  Pressure Reduction Devices: specialty bed utilized  Skin Protection:   incontinence pads utilized   transparent dressing maintained  Taken 1/24/2025 2212 by Marly Armas, RN  Activity Management: activity minimized  Pressure Reduction Techniques:   weight shift assistance provided   pressure points protected  Head of Bed (HOB) Positioning: HOB at 30-45 degrees  Pressure  Reduction Devices: specialty bed utilized  Skin Protection:   incontinence pads utilized   transparent dressing maintained  Taken 1/24/2025 2000 by Marly Armas RN  Activity Management: activity minimized  Pressure Reduction Techniques:   weight shift assistance provided   pressure points protected  Head of Bed (HOB) Positioning: HOB at 30-45 degrees  Pressure Reduction Devices:   specialty bed utilized   pressure-redistributing mattress utilized  Skin Protection:   incontinence pads utilized   silicone foam dressing in place   transparent dressing maintained     Problem: Restraint, Nonviolent  Goal: Absence of Harm or Injury  Intervention: Implement Least Restrictive Safety Strategies  Recent Flowsheet Documentation  Taken 1/24/2025 2000 by Marly Armas RN  Medical Device Protection: IV pole/bag removed from visual field  Diversional Activities: television  Intervention: Protect Dignity, Rights and Personal Wellbeing  Recent Flowsheet Documentation  Taken 1/25/2025 0401 by Marly Armas RN  Trust Relationship/Rapport:   care explained   choices provided   empathic listening provided   emotional support provided   thoughts/feelings acknowledged   questions answered   questions encouraged   reassurance provided  Taken 1/25/2025 0200 by Marly Armas RN  Trust Relationship/Rapport:   care explained   choices provided   empathic listening provided   emotional support provided   thoughts/feelings acknowledged   questions answered   questions encouraged   reassurance provided  Taken 1/25/2025 0055 by Marly Armas RN  Trust Relationship/Rapport:   care explained   choices provided   empathic listening provided   emotional support provided   thoughts/feelings acknowledged   questions answered   questions encouraged   reassurance provided  Taken 1/24/2025 2212 by Marly Armas RN  Trust Relationship/Rapport:   care explained   choices provided   empathic listening provided   emotional support provided    thoughts/feelings acknowledged   questions answered   questions encouraged   reassurance provided  Taken 1/24/2025 2000 by Marly Armas RN  Trust Relationship/Rapport:   care explained   choices provided   empathic listening provided   emotional support provided   thoughts/feelings acknowledged   questions answered   questions encouraged   reassurance provided  Intervention: Protect Skin and Joint Integrity  Recent Flowsheet Documentation  Taken 1/25/2025 0600 by Marly Armas RN  Body Position: side-lying  Skin Protection:   incontinence pads utilized   transparent dressing maintained  Taken 1/25/2025 0401 by Marly Armas RN  Body Position:   turned   side-lying   left  Skin Protection:   incontinence pads utilized   transparent dressing maintained  Taken 1/25/2025 0200 by Marly Armas RN  Body Position:   turned   right   side-lying  Skin Protection:   incontinence pads utilized   transparent dressing maintained  Taken 1/25/2025 0055 by Marly Armas RN  Body Position:   turned   left  Skin Protection:   incontinence pads utilized   transparent dressing maintained  Taken 1/24/2025 2212 by Marly Armas RN  Body Position:   turned   right   side-lying  Skin Protection:   incontinence pads utilized   transparent dressing maintained  Taken 1/24/2025 2000 by Marly Armas RN  Body Position: position changed independently  Skin Protection:   incontinence pads utilized   silicone foam dressing in place   transparent dressing maintained  Range of Motion: active ROM (range of motion) encouraged     Problem: Mechanical Ventilation Invasive  Goal: Effective Communication  Intervention: Ensure Effective Communication  Recent Flowsheet Documentation  Taken 1/25/2025 0401 by Marly Armas RN  Trust Relationship/Rapport:   care explained   choices provided   empathic listening provided   emotional support provided   thoughts/feelings acknowledged   questions answered   questions encouraged   reassurance  provided  Family/Support System Care:   support provided   self-care encouraged  Taken 1/25/2025 0200 by Marly Armas RN  Trust Relationship/Rapport:   care explained   choices provided   empathic listening provided   emotional support provided   thoughts/feelings acknowledged   questions answered   questions encouraged   reassurance provided  Family/Support System Care:   support provided   self-care encouraged  Taken 1/25/2025 0055 by Marly Armas RN  Trust Relationship/Rapport:   care explained   choices provided   empathic listening provided   emotional support provided   thoughts/feelings acknowledged   questions answered   questions encouraged   reassurance provided  Family/Support System Care:   support provided   self-care encouraged  Taken 1/24/2025 2212 by Marly Armas RN  Trust Relationship/Rapport:   care explained   choices provided   empathic listening provided   emotional support provided   thoughts/feelings acknowledged   questions answered   questions encouraged   reassurance provided  Family/Support System Care:   support provided   self-care encouraged  Taken 1/24/2025 2000 by Marly Armas RN  Trust Relationship/Rapport:   care explained   choices provided   empathic listening provided   emotional support provided   thoughts/feelings acknowledged   questions answered   questions encouraged   reassurance provided  Diversional Activities: television  Family/Support System Care:   support provided   self-care encouraged  Goal: Mechanical Ventilation Liberation  Intervention: Promote Extubation and Mechanical Ventilation Liberation  Recent Flowsheet Documentation  Taken 1/24/2025 2000 by Marly Armas RN  Medication Review/Management: medications reviewed  Goal: Absence of Device-Related Skin and Tissue Injury  Intervention: Maintain Skin and Tissue Health  Recent Flowsheet Documentation  Taken 1/25/2025 0600 by Marly Armas RN  Device Skin Pressure Protection:   absorbent pad  utilized/changed   positioning supports utilized   tubing/devices free from skin contact  Taken 1/25/2025 0401 by Marly Armas RN  Device Skin Pressure Protection:   absorbent pad utilized/changed   positioning supports utilized   tubing/devices free from skin contact  Taken 1/25/2025 0200 by Marly Armas RN  Device Skin Pressure Protection:   absorbent pad utilized/changed   positioning supports utilized   tubing/devices free from skin contact  Taken 1/25/2025 0055 by Marly Armas RN  Device Skin Pressure Protection:   absorbent pad utilized/changed   positioning supports utilized   tubing/devices free from skin contact  Taken 1/24/2025 2212 by Marly Armas RN  Device Skin Pressure Protection:   absorbent pad utilized/changed   positioning supports utilized   tubing/devices free from skin contact  Taken 1/24/2025 2000 by Marly Armas RN  Device Skin Pressure Protection:   absorbent pad utilized/changed   adhesive use limited   positioning supports utilized   tubing/devices free from skin contact  Goal: Absence of Ventilator-Induced Lung Injury  Intervention: Prevent Ventilator-Associated Pneumonia  Recent Flowsheet Documentation  Taken 1/25/2025 0600 by Marly Armas RN  Head of Bed (HOB) Positioning: HOB at 30-45 degrees  Taken 1/25/2025 0401 by Marly Armas RN  Head of Bed (HOB) Positioning: HOB at 30-45 degrees  Taken 1/25/2025 0200 by Marly Armas RN  Head of Bed (HOB) Positioning: HOB at 30-45 degrees  Taken 1/25/2025 0055 by Marly Armas RN  Head of Bed (HOB) Positioning: HOB at 30-45 degrees  Taken 1/24/2025 2212 by Marly Armas RN  Head of Bed (HOB) Positioning: HOB at 30-45 degrees  Taken 1/24/2025 2000 by Marly Armas RN  Head of Bed (HOB) Positioning: HOB at 30-45 degrees     Problem: Fall Injury Risk  Goal: Absence of Fall and Fall-Related Injury  Intervention: Identify and Manage Contributors  Recent Flowsheet Documentation  Taken 1/24/2025 2000 by Marly Armas  RN  Medication Review/Management: medications reviewed  Intervention: Promote Injury-Free Environment  Recent Flowsheet Documentation  Taken 1/25/2025 0600 by Marly Armas RN  Safety Promotion/Fall Prevention:   activity supervised   assistive device/personal items within reach   clutter free environment maintained   elopement precautions   fall prevention program maintained   room organization consistent   safety round/check completed   toileting scheduled   nonskid shoes/slippers when out of bed  Taken 1/25/2025 0401 by Marly Armas RN  Safety Promotion/Fall Prevention:   activity supervised   assistive device/personal items within reach   clutter free environment maintained   elopement precautions   fall prevention program maintained   room organization consistent   safety round/check completed   toileting scheduled   nonskid shoes/slippers when out of bed  Taken 1/25/2025 0200 by Marly Armas RN  Safety Promotion/Fall Prevention:   activity supervised   assistive device/personal items within reach   clutter free environment maintained   elopement precautions   fall prevention program maintained   room organization consistent   safety round/check completed   toileting scheduled   nonskid shoes/slippers when out of bed  Taken 1/25/2025 0055 by Marly Armas RN  Safety Promotion/Fall Prevention:   activity supervised   assistive device/personal items within reach   clutter free environment maintained   elopement precautions   fall prevention program maintained   room organization consistent   safety round/check completed   toileting scheduled   nonskid shoes/slippers when out of bed  Taken 1/24/2025 2212 by Marly Armas RN  Safety Promotion/Fall Prevention:   activity supervised   assistive device/personal items within reach   clutter free environment maintained   elopement precautions   fall prevention program maintained   room organization consistent   safety round/check completed   toileting  scheduled   nonskid shoes/slippers when out of bed  Taken 1/24/2025 2000 by Marly Armas RN  Safety Promotion/Fall Prevention:   activity supervised   assistive device/personal items within reach   clutter free environment maintained   elopement precautions   fall prevention program maintained   room organization consistent   safety round/check completed   toileting scheduled   nonskid shoes/slippers when out of bed     Problem: Comorbidity Management  Goal: Maintenance of Asthma Control  Intervention: Maintain Asthma Symptom Control  Recent Flowsheet Documentation  Taken 1/24/2025 2000 by Marly Armas RN  Medication Review/Management: medications reviewed  Goal: Maintenance of Behavioral Health Symptom Control  Intervention: Maintain Behavioral Health Symptom Control  Recent Flowsheet Documentation  Taken 1/24/2025 2000 by Marly Armas RN  Medication Review/Management: medications reviewed  Goal: Maintenance of COPD Symptom Control  Intervention: Maintain COPD (Chronic Obstructive Pulmonary Disease) Symptom Control  Recent Flowsheet Documentation  Taken 1/24/2025 2000 by Marly Armas RN  Medication Review/Management: medications reviewed  Goal: Blood Glucose Level Within Target Range  Intervention: Monitor and Manage Glycemia  Recent Flowsheet Documentation  Taken 1/24/2025 2000 by Marly Armas RN  Medication Review/Management: medications reviewed  Goal: Maintenance of Heart Failure Symptom Control  Intervention: Maintain Heart Failure Management  Recent Flowsheet Documentation  Taken 1/24/2025 2000 by Marly Armas RN  Medication Review/Management: medications reviewed  Goal: Blood Pressure in Desired Range  Intervention: Maintain Blood Pressure Management  Recent Flowsheet Documentation  Taken 1/24/2025 2000 by Marly Armas RN  Medication Review/Management: medications reviewed  Goal: Maintenance of Osteoarthritis Symptom Control  Intervention: Maintain Osteoarthritis Symptom Control  Recent  Flowsheet Documentation  Taken 1/25/2025 0600 by Marly Armas RN  Activity Management: activity minimized  Taken 1/25/2025 0401 by Marly Armas RN  Activity Management: activity minimized  Taken 1/25/2025 0200 by Marly Armas RN  Activity Management: activity minimized  Taken 1/25/2025 0055 by Marly Armas RN  Activity Management: activity minimized  Taken 1/24/2025 2212 by Marly Armas RN  Activity Management: activity minimized  Taken 1/24/2025 2000 by Marly Armas RN  Activity Management: activity minimized  Medication Review/Management: medications reviewed  Goal: Bariatric Home Regimen Maintained  Intervention: Maintain and Manage Postbariatric Surgery Care  Recent Flowsheet Documentation  Taken 1/24/2025 2000 by Marly Armas RN  Medication Review/Management: medications reviewed  Goal: Maintenance of Seizure Control  Intervention: Maintain Seizure Symptom Control  Recent Flowsheet Documentation  Taken 1/24/2025 2000 by Marly Armas RN  Medication Review/Management: medications reviewed     Problem: Hemodialysis  Goal: Safe, Effective Therapy Delivery  Intervention: Optimize Device Care and Function  Recent Flowsheet Documentation  Taken 1/24/2025 2000 by Marly Armas RN  Medication Review/Management: medications reviewed  Goal: Absence of Infection Signs and Symptoms  Intervention: Prevent or Manage Infection  Recent Flowsheet Documentation  Taken 1/25/2025 0600 by Marly Armas RN  Infection Prevention:   single patient room provided   rest/sleep promoted   visitors restricted/screened  Taken 1/25/2025 0401 by Marly Armas RN  Infection Prevention:   single patient room provided   rest/sleep promoted   visitors restricted/screened  Taken 1/25/2025 0200 by Marly Armas RN  Infection Prevention:   single patient room provided   rest/sleep promoted   visitors restricted/screened  Taken 1/25/2025 0055 by Marly Armas RN  Infection Prevention:   single patient room provided    rest/sleep promoted   visitors restricted/screened  Taken 1/24/2025 2212 by Marly Armas RN  Infection Prevention:   single patient room provided   rest/sleep promoted   visitors restricted/screened  Taken 1/24/2025 2000 by Marly Armas RN  Infection Prevention:   single patient room provided   rest/sleep promoted   visitors restricted/screened     Problem: Sepsis/Septic Shock  Goal: Optimal Coping  Intervention: Support Patient and Family Response  Recent Flowsheet Documentation  Taken 1/25/2025 0401 by Marly Armas RN  Family/Support System Care:   support provided   self-care encouraged  Taken 1/25/2025 0200 by Marly Armas RN  Family/Support System Care:   support provided   self-care encouraged  Taken 1/25/2025 0055 by Marly Armas RN  Family/Support System Care:   support provided   self-care encouraged  Taken 1/24/2025 2212 by Marly Armas RN  Family/Support System Care:   support provided   self-care encouraged  Taken 1/24/2025 2000 by Marly Armas RN  Family/Support System Care:   support provided   self-care encouraged  Goal: Absence of Bleeding  Intervention: Monitor and Manage Bleeding  Recent Flowsheet Documentation  Taken 1/24/2025 2000 by Marly Armas RN  Bleeding Management: dressing monitored  Goal: Absence of Infection Signs and Symptoms  Intervention: Initiate Sepsis Management  Recent Flowsheet Documentation  Taken 1/25/2025 0600 by Marly Armas RN  Infection Prevention:   single patient room provided   rest/sleep promoted   visitors restricted/screened  Isolation Precautions: precautions maintained  Taken 1/25/2025 0401 by Marly Armas RN  Infection Prevention:   single patient room provided   rest/sleep promoted   visitors restricted/screened  Isolation Precautions: precautions maintained  Taken 1/25/2025 0200 by Marly Armas RN  Infection Prevention:   single patient room provided   rest/sleep promoted   visitors restricted/screened  Isolation Precautions:  precautions maintained  Taken 1/25/2025 0055 by Marly Armas RN  Infection Prevention:   single patient room provided   rest/sleep promoted   visitors restricted/screened  Isolation Precautions: precautions maintained  Taken 1/24/2025 2212 by Marly Armas RN  Infection Prevention:   single patient room provided   rest/sleep promoted   visitors restricted/screened  Isolation Precautions: precautions maintained  Taken 1/24/2025 2000 by Marly Armas RN  Infection Prevention:   single patient room provided   rest/sleep promoted   visitors restricted/screened  Isolation Precautions: precautions maintained  Intervention: Promote Recovery  Recent Flowsheet Documentation  Taken 1/25/2025 0600 by Marly Armas RN  Activity Management: activity minimized  Taken 1/25/2025 0401 by Marly Armas RN  Activity Management: activity minimized  Taken 1/25/2025 0200 by Marly Armas RN  Activity Management: activity minimized  Taken 1/25/2025 0055 by Marly Armas RN  Activity Management: activity minimized  Taken 1/24/2025 2212 by Marly Armas RN  Activity Management: activity minimized  Taken 1/24/2025 2000 by Marly Armas RN  Activity Management: activity minimized  Goal: Optimal Coping  Intervention: Support Patient and Family Response  Recent Flowsheet Documentation  Taken 1/25/2025 0401 by Marly Armas RN  Family/Support System Care:   support provided   self-care encouraged  Taken 1/25/2025 0200 by Marly Armas RN  Family/Support System Care:   support provided   self-care encouraged  Taken 1/25/2025 0055 by Marly Armas RN  Family/Support System Care:   support provided   self-care encouraged  Taken 1/24/2025 2212 by Marly Armas RN  Family/Support System Care:   support provided   self-care encouraged  Taken 1/24/2025 2000 by Marly Armas RN  Family/Support System Care:   support provided   self-care encouraged  Goal: Absence of Bleeding  Intervention: Monitor and Manage Bleeding  Recent  Flowsheet Documentation  Taken 1/24/2025 2000 by Marly Armas RN  Bleeding Management: dressing monitored  Goal: Absence of Infection Signs and Symptoms  Intervention: Initiate Sepsis Management  Recent Flowsheet Documentation  Taken 1/25/2025 0600 by Marly Armas RN  Infection Prevention:   single patient room provided   rest/sleep promoted   visitors restricted/screened  Isolation Precautions: precautions maintained  Taken 1/25/2025 0401 by Marly Armas RN  Infection Prevention:   single patient room provided   rest/sleep promoted   visitors restricted/screened  Isolation Precautions: precautions maintained  Taken 1/25/2025 0200 by Marly Armas RN  Infection Prevention:   single patient room provided   rest/sleep promoted   visitors restricted/screened  Isolation Precautions: precautions maintained  Taken 1/25/2025 0055 by Marly Armas RN  Infection Prevention:   single patient room provided   rest/sleep promoted   visitors restricted/screened  Isolation Precautions: precautions maintained  Taken 1/24/2025 2212 by Marly Armas RN  Infection Prevention:   single patient room provided   rest/sleep promoted   visitors restricted/screened  Isolation Precautions: precautions maintained  Taken 1/24/2025 2000 by Marly Armas RN  Infection Prevention:   single patient room provided   rest/sleep promoted   visitors restricted/screened  Isolation Precautions: precautions maintained  Intervention: Promote Recovery  Recent Flowsheet Documentation  Taken 1/25/2025 0600 by Marly Armas RN  Activity Management: activity minimized  Taken 1/25/2025 0401 by Marly Armas RN  Activity Management: activity minimized  Taken 1/25/2025 0200 by Marly Armas RN  Activity Management: activity minimized  Taken 1/25/2025 0055 by Marly Armas RN  Activity Management: activity minimized  Taken 1/24/2025 2212 by Marly Armas RN  Activity Management: activity minimized  Taken 1/24/2025 2000 by Marly Armas  RN  Activity Management: activity minimized     Problem: Pneumonia  Goal: Absence of Infection Signs and Symptoms  Intervention: Prevent Infection Progression  Recent Flowsheet Documentation  Taken 1/25/2025 0600 by Marly Armas RN  Isolation Precautions: precautions maintained  Taken 1/25/2025 0401 by Marly Armas RN  Isolation Precautions: precautions maintained  Taken 1/25/2025 0200 by Marly Armas RN  Isolation Precautions: precautions maintained  Taken 1/25/2025 0055 by Marly Armas RN  Isolation Precautions: precautions maintained  Taken 1/24/2025 2212 by Marly Armas RN  Isolation Precautions: precautions maintained  Taken 1/24/2025 2000 by Marly Armas RN  Isolation Precautions: precautions maintained  Goal: Effective Oxygenation and Ventilation  Intervention: Promote Airway Secretion Clearance  Recent Flowsheet Documentation  Taken 1/25/2025 0600 by Marly Armas RN  Activity Management: activity minimized  Taken 1/25/2025 0401 by Marly Armas RN  Activity Management: activity minimized  Taken 1/25/2025 0200 by Marly Armas RN  Activity Management: activity minimized  Taken 1/25/2025 0055 by Marly Armas RN  Activity Management: activity minimized  Taken 1/24/2025 2212 by Marly Armas RN  Activity Management: activity minimized  Taken 1/24/2025 2000 by Marly Armas RN  Activity Management: activity minimized  Cough And Deep Breathing: done independently per patient  Intervention: Optimize Oxygenation and Ventilation  Recent Flowsheet Documentation  Taken 1/25/2025 0600 by Marly Armas RN  Head of Bed (HOB) Positioning: HOB at 30-45 degrees  Taken 1/25/2025 0401 by Marly Armas RN  Head of Bed (HOB) Positioning: HOB at 30-45 degrees  Taken 1/25/2025 0200 by Marly Armas RN  Head of Bed (HOB) Positioning: HOB at 30-45 degrees  Taken 1/25/2025 0055 by Marly Armas RN  Head of Bed (HOB) Positioning: HOB at 30-45 degrees  Taken 1/24/2025 2212 by Marly Armas  RN  Head of Bed (HOB) Positioning: HOB at 30-45 degrees  Taken 1/24/2025 2000 by Marly Armas RN  Head of Bed (HOB) Positioning: HOB at 30-45 degrees     Problem: Acute Kidney Injury/Impairment  Goal: Effective Renal Function  Intervention: Monitor and Support Renal Function  Recent Flowsheet Documentation  Taken 1/24/2025 2000 by Marly Armas, RN  Medication Review/Management: medications reviewed   Goal Outcome Evaluation:

## 2025-01-26 ENCOUNTER — APPOINTMENT (OUTPATIENT)
Dept: GENERAL RADIOLOGY | Facility: HOSPITAL | Age: 40
DRG: 870 | End: 2025-01-26
Payer: COMMERCIAL

## 2025-01-26 LAB
ALBUMIN SERPL-MCNC: 4.1 G/DL (ref 3.5–5.2)
ALBUMIN/GLOB SERPL: 1 G/DL
ALP SERPL-CCNC: 140 U/L (ref 39–117)
ALT SERPL W P-5'-P-CCNC: 38 U/L (ref 1–33)
ANION GAP SERPL CALCULATED.3IONS-SCNC: 13 MMOL/L (ref 5–15)
AST SERPL-CCNC: 64 U/L (ref 1–32)
BASOPHILS # BLD AUTO: 0.04 10*3/MM3 (ref 0–0.2)
BASOPHILS NFR BLD AUTO: 0.7 % (ref 0–1.5)
BH CV UPPER VENOUS RIGHT AXILLARY AUGMENT: NORMAL
BH CV UPPER VENOUS RIGHT AXILLARY COMPRESS: NORMAL
BH CV UPPER VENOUS RIGHT AXILLARY PHASIC: NORMAL
BH CV UPPER VENOUS RIGHT AXILLARY SPONT: NORMAL
BH CV UPPER VENOUS RIGHT BASILIC FOREARM COMPRESS: NORMAL
BH CV UPPER VENOUS RIGHT BASILIC UPPER COMPRESS: NORMAL
BH CV UPPER VENOUS RIGHT BRACHIAL COMPRESS: NORMAL
BH CV UPPER VENOUS RIGHT CEPHALIC FOREARM COMPRESS: NORMAL
BH CV UPPER VENOUS RIGHT CEPHALIC UPPER COMPRESS: NORMAL
BH CV UPPER VENOUS RIGHT INTERNAL JUGULAR AUGMENT: NORMAL
BH CV UPPER VENOUS RIGHT INTERNAL JUGULAR COMPRESS: NORMAL
BH CV UPPER VENOUS RIGHT INTERNAL JUGULAR PHASIC: NORMAL
BH CV UPPER VENOUS RIGHT INTERNAL JUGULAR SPONT: NORMAL
BH CV UPPER VENOUS RIGHT RADIAL COMPRESS: NORMAL
BH CV UPPER VENOUS RIGHT SUBCLAVIAN AUGMENT: NORMAL
BH CV UPPER VENOUS RIGHT SUBCLAVIAN COMPRESS: NORMAL
BH CV UPPER VENOUS RIGHT SUBCLAVIAN PHASIC: NORMAL
BH CV UPPER VENOUS RIGHT SUBCLAVIAN SPONT: NORMAL
BH CV UPPER VENOUS RIGHT ULNAR COMPRESS: NORMAL
BILIRUB SERPL-MCNC: 0.6 MG/DL (ref 0–1.2)
BUN SERPL-MCNC: 28 MG/DL (ref 6–20)
BUN/CREAT SERPL: 48.3 (ref 7–25)
CALCIUM SPEC-SCNC: 10.8 MG/DL (ref 8.6–10.5)
CHLORIDE SERPL-SCNC: 86 MMOL/L (ref 98–107)
CO2 SERPL-SCNC: 27 MMOL/L (ref 22–29)
CREAT SERPL-MCNC: 0.58 MG/DL (ref 0.57–1)
CRP SERPL-MCNC: 3.58 MG/DL (ref 0–0.5)
DEPRECATED RDW RBC AUTO: 56.1 FL (ref 37–54)
EGFRCR SERPLBLD CKD-EPI 2021: 119 ML/MIN/1.73
EOSINOPHIL # BLD AUTO: 0.38 10*3/MM3 (ref 0–0.4)
EOSINOPHIL NFR BLD AUTO: 6.3 % (ref 0.3–6.2)
ERYTHROCYTE [DISTWIDTH] IN BLOOD BY AUTOMATED COUNT: 17.2 % (ref 12.3–15.4)
GEN 5 1HR TROPONIN T REFLEX: 36 NG/L
GLOBULIN UR ELPH-MCNC: 4 GM/DL
GLUCOSE BLDC GLUCOMTR-MCNC: 242 MG/DL (ref 70–130)
GLUCOSE BLDC GLUCOMTR-MCNC: 270 MG/DL (ref 70–130)
GLUCOSE BLDC GLUCOMTR-MCNC: 272 MG/DL (ref 70–130)
GLUCOSE BLDC GLUCOMTR-MCNC: 272 MG/DL (ref 70–130)
GLUCOSE SERPL-MCNC: 241 MG/DL (ref 65–99)
HCT VFR BLD AUTO: 30.1 % (ref 34–46.6)
HGB BLD-MCNC: 9.7 G/DL (ref 12–15.9)
IMM GRANULOCYTES # BLD AUTO: 0.01 10*3/MM3 (ref 0–0.05)
IMM GRANULOCYTES NFR BLD AUTO: 0.2 % (ref 0–0.5)
LYMPHOCYTES # BLD AUTO: 1.48 10*3/MM3 (ref 0.7–3.1)
LYMPHOCYTES NFR BLD AUTO: 24.5 % (ref 19.6–45.3)
MAGNESIUM SERPL-MCNC: 1.2 MG/DL (ref 1.6–2.6)
MCH RBC QN AUTO: 29.2 PG (ref 26.6–33)
MCHC RBC AUTO-ENTMCNC: 32.2 G/DL (ref 31.5–35.7)
MCV RBC AUTO: 90.7 FL (ref 79–97)
MONOCYTES # BLD AUTO: 0.57 10*3/MM3 (ref 0.1–0.9)
MONOCYTES NFR BLD AUTO: 9.4 % (ref 5–12)
NEUTROPHILS NFR BLD AUTO: 3.57 10*3/MM3 (ref 1.7–7)
NEUTROPHILS NFR BLD AUTO: 58.9 % (ref 42.7–76)
NRBC BLD AUTO-RTO: 0 /100 WBC (ref 0–0.2)
PHOSPHATE SERPL-MCNC: 4.5 MG/DL (ref 2.5–4.5)
PLATELET # BLD AUTO: 188 10*3/MM3 (ref 140–450)
PMV BLD AUTO: 9.5 FL (ref 6–12)
POTASSIUM SERPL-SCNC: 4.1 MMOL/L (ref 3.5–5.2)
PROT SERPL-MCNC: 8.1 G/DL (ref 6–8.5)
QT INTERVAL: 360 MS
QT INTERVAL: 370 MS
QTC INTERVAL: 466 MS
QTC INTERVAL: 472 MS
RBC # BLD AUTO: 3.32 10*6/MM3 (ref 3.77–5.28)
SODIUM SERPL-SCNC: 126 MMOL/L (ref 136–145)
TROPONIN T % DELTA: 6
TROPONIN T NUMERIC DELTA: 2 NG/L
TROPONIN T SERPL HS-MCNC: 34 NG/L
WBC NRBC COR # BLD AUTO: 6.05 10*3/MM3 (ref 3.4–10.8)

## 2025-01-26 PROCEDURE — 84100 ASSAY OF PHOSPHORUS: CPT | Performed by: INTERNAL MEDICINE

## 2025-01-26 PROCEDURE — 25010000002 BUMETANIDE PER 0.5 MG: Performed by: INTERNAL MEDICINE

## 2025-01-26 PROCEDURE — 99232 SBSQ HOSP IP/OBS MODERATE 35: CPT | Performed by: INTERNAL MEDICINE

## 2025-01-26 PROCEDURE — 25010000002 ALBUMIN HUMAN 25% PER 50 ML: Performed by: INTERNAL MEDICINE

## 2025-01-26 PROCEDURE — 84484 ASSAY OF TROPONIN QUANT: CPT | Performed by: PHYSICIAN ASSISTANT

## 2025-01-26 PROCEDURE — 25010000002 ENOXAPARIN PER 10 MG: Performed by: INTERNAL MEDICINE

## 2025-01-26 PROCEDURE — 25010000002 MAGNESIUM SULFATE 2 GM/50ML SOLUTION: Performed by: INTERNAL MEDICINE

## 2025-01-26 PROCEDURE — 93005 ELECTROCARDIOGRAM TRACING: CPT | Performed by: INTERNAL MEDICINE

## 2025-01-26 PROCEDURE — 71045 X-RAY EXAM CHEST 1 VIEW: CPT

## 2025-01-26 PROCEDURE — 85025 COMPLETE CBC W/AUTO DIFF WBC: CPT | Performed by: INTERNAL MEDICINE

## 2025-01-26 PROCEDURE — P9047 ALBUMIN (HUMAN), 25%, 50ML: HCPCS | Performed by: INTERNAL MEDICINE

## 2025-01-26 PROCEDURE — 63710000001 INSULIN GLARGINE PER 5 UNITS: Performed by: PEDIATRICS

## 2025-01-26 PROCEDURE — 82948 REAGENT STRIP/BLOOD GLUCOSE: CPT

## 2025-01-26 PROCEDURE — 80053 COMPREHEN METABOLIC PANEL: CPT | Performed by: INTERNAL MEDICINE

## 2025-01-26 PROCEDURE — 25010000002 HYDROMORPHONE PER 4 MG: Performed by: INTERNAL MEDICINE

## 2025-01-26 PROCEDURE — 83735 ASSAY OF MAGNESIUM: CPT | Performed by: INTERNAL MEDICINE

## 2025-01-26 PROCEDURE — 25010000002 METOCLOPRAMIDE PER 10 MG: Performed by: PEDIATRICS

## 2025-01-26 PROCEDURE — 93010 ELECTROCARDIOGRAM REPORT: CPT | Performed by: INTERNAL MEDICINE

## 2025-01-26 PROCEDURE — 63710000001 INSULIN REGULAR HUMAN PER 5 UNITS: Performed by: PEDIATRICS

## 2025-01-26 PROCEDURE — 86140 C-REACTIVE PROTEIN: CPT | Performed by: INTERNAL MEDICINE

## 2025-01-26 PROCEDURE — 63710000001 INSULIN REGULAR HUMAN PER 5 UNITS: Performed by: INTERNAL MEDICINE

## 2025-01-26 RX ORDER — MAGNESIUM SULFATE HEPTAHYDRATE 40 MG/ML
2 INJECTION, SOLUTION INTRAVENOUS
Status: COMPLETED | OUTPATIENT
Start: 2025-01-26 | End: 2025-01-26

## 2025-01-26 RX ORDER — TORSEMIDE 20 MG/1
40 TABLET ORAL DAILY
Status: DISCONTINUED | OUTPATIENT
Start: 2025-01-26 | End: 2025-01-29

## 2025-01-26 RX ORDER — HYDROXYZINE HYDROCHLORIDE 25 MG/1
25 TABLET, FILM COATED ORAL NIGHTLY PRN
Status: DISCONTINUED | OUTPATIENT
Start: 2025-01-26 | End: 2025-01-27

## 2025-01-26 RX ORDER — ALBUMIN (HUMAN) 12.5 G/50ML
50 SOLUTION INTRAVENOUS 2 TIMES DAILY
Status: COMPLETED | OUTPATIENT
Start: 2025-01-26 | End: 2025-01-27

## 2025-01-26 RX ADMIN — METOCLOPRAMIDE HYDROCHLORIDE 10 MG: 10 INJECTION, SOLUTION INTRAMUSCULAR; INTRAVENOUS at 17:49

## 2025-01-26 RX ADMIN — ALBUMIN (HUMAN) 50 G: 0.25 INJECTION, SOLUTION INTRAVENOUS at 21:04

## 2025-01-26 RX ADMIN — INSULIN HUMAN 12 UNITS: 100 INJECTION, SOLUTION PARENTERAL at 12:53

## 2025-01-26 RX ADMIN — DULOXETINE HYDROCHLORIDE 30 MG: 30 CAPSULE, DELAYED RELEASE ORAL at 20:54

## 2025-01-26 RX ADMIN — MAGNESIUM SULFATE IN WATER FOR 2 G: 40 INJECTION INTRAVENOUS at 08:28

## 2025-01-26 RX ADMIN — HYDROMORPHONE HYDROCHLORIDE 0.5 MG: 1 INJECTION, SOLUTION INTRAMUSCULAR; INTRAVENOUS; SUBCUTANEOUS at 05:35

## 2025-01-26 RX ADMIN — AMITRIPTYLINE HYDROCHLORIDE 25 MG: 25 TABLET, FILM COATED ORAL at 20:54

## 2025-01-26 RX ADMIN — GABAPENTIN 300 MG: 300 CAPSULE ORAL at 06:18

## 2025-01-26 RX ADMIN — METOCLOPRAMIDE HYDROCHLORIDE 10 MG: 10 INJECTION, SOLUTION INTRAMUSCULAR; INTRAVENOUS at 20:55

## 2025-01-26 RX ADMIN — CHOLESTYRAMINE 4 G: 4 POWDER, FOR SUSPENSION ORAL at 13:01

## 2025-01-26 RX ADMIN — CHOLESTYRAMINE 4 G: 4 POWDER, FOR SUSPENSION ORAL at 06:18

## 2025-01-26 RX ADMIN — HYDROCODONE BITARTRATE AND ACETAMINOPHEN 1 TABLET: 5; 325 TABLET ORAL at 12:52

## 2025-01-26 RX ADMIN — METOCLOPRAMIDE HYDROCHLORIDE 10 MG: 10 INJECTION, SOLUTION INTRAMUSCULAR; INTRAVENOUS at 05:05

## 2025-01-26 RX ADMIN — HYDROMORPHONE HYDROCHLORIDE 0.5 MG: 1 INJECTION, SOLUTION INTRAMUSCULAR; INTRAVENOUS; SUBCUTANEOUS at 17:49

## 2025-01-26 RX ADMIN — AMLODIPINE BESYLATE 10 MG: 10 TABLET ORAL at 08:29

## 2025-01-26 RX ADMIN — INSULIN HUMAN 8 UNITS: 100 INJECTION, SOLUTION PARENTERAL at 06:18

## 2025-01-26 RX ADMIN — INSULIN HUMAN 12 UNITS: 100 INJECTION, SOLUTION PARENTERAL at 17:49

## 2025-01-26 RX ADMIN — GABAPENTIN 300 MG: 300 CAPSULE ORAL at 20:54

## 2025-01-26 RX ADMIN — METOPROLOL SUCCINATE 50 MG: 50 TABLET, EXTENDED RELEASE ORAL at 08:28

## 2025-01-26 RX ADMIN — OXYCODONE HYDROCHLORIDE 5 MG: 5 SOLUTION ORAL at 08:28

## 2025-01-26 RX ADMIN — OXYCODONE HYDROCHLORIDE 5 MG: 5 SOLUTION ORAL at 00:47

## 2025-01-26 RX ADMIN — HYDROCODONE BITARTRATE AND ACETAMINOPHEN 1 TABLET: 5; 325 TABLET ORAL at 05:54

## 2025-01-26 RX ADMIN — AVOBENZONE, HOMOSALATE, OCTISALATE, OCTOCRYLENE, AND OXYBENZONE 1 PACKET: 29.4; 147; 49; 25.4; 58.8 LOTION TOPICAL at 20:55

## 2025-01-26 RX ADMIN — ENOXAPARIN SODIUM 120 MG: 120 INJECTION SUBCUTANEOUS at 12:54

## 2025-01-26 RX ADMIN — INSULIN GLARGINE 35 UNITS: 100 INJECTION, SOLUTION SUBCUTANEOUS at 20:56

## 2025-01-26 RX ADMIN — METOCLOPRAMIDE HYDROCHLORIDE 10 MG: 10 INJECTION, SOLUTION INTRAMUSCULAR; INTRAVENOUS at 12:52

## 2025-01-26 RX ADMIN — NYSTATIN: 100000 POWDER TOPICAL at 08:27

## 2025-01-26 RX ADMIN — NYSTATIN: 100000 POWDER TOPICAL at 20:55

## 2025-01-26 RX ADMIN — ENOXAPARIN SODIUM 120 MG: 120 INJECTION SUBCUTANEOUS at 00:47

## 2025-01-26 RX ADMIN — BUMETANIDE 1 MG: 0.25 INJECTION INTRAMUSCULAR; INTRAVENOUS at 08:27

## 2025-01-26 RX ADMIN — HYDROMORPHONE HYDROCHLORIDE 0.5 MG: 1 INJECTION, SOLUTION INTRAMUSCULAR; INTRAVENOUS; SUBCUTANEOUS at 13:41

## 2025-01-26 RX ADMIN — CHOLESTYRAMINE 4 G: 4 POWDER, FOR SUSPENSION ORAL at 20:55

## 2025-01-26 RX ADMIN — METOPROLOL SUCCINATE 50 MG: 50 TABLET, EXTENDED RELEASE ORAL at 20:54

## 2025-01-26 RX ADMIN — HYDROMORPHONE HYDROCHLORIDE 0.5 MG: 1 INJECTION, SOLUTION INTRAMUSCULAR; INTRAVENOUS; SUBCUTANEOUS at 02:31

## 2025-01-26 RX ADMIN — MAGNESIUM SULFATE IN WATER FOR 2 G: 40 INJECTION INTRAVENOUS at 06:36

## 2025-01-26 RX ADMIN — MAGNESIUM SULFATE IN WATER FOR 2 G: 40 INJECTION INTRAVENOUS at 03:38

## 2025-01-26 RX ADMIN — Medication 1 APPLICATION: at 08:31

## 2025-01-26 RX ADMIN — Medication 1 CAPSULE: at 08:29

## 2025-01-26 RX ADMIN — DULOXETINE HYDROCHLORIDE 60 MG: 60 CAPSULE, DELAYED RELEASE ORAL at 08:31

## 2025-01-26 RX ADMIN — INSULIN HUMAN 12 UNITS: 100 INJECTION, SOLUTION PARENTERAL at 06:18

## 2025-01-26 RX ADMIN — Medication 1 APPLICATION: at 20:56

## 2025-01-26 RX ADMIN — INSULIN HUMAN 12 UNITS: 100 INJECTION, SOLUTION PARENTERAL at 00:47

## 2025-01-26 RX ADMIN — Medication 5 MG: at 22:02

## 2025-01-26 RX ADMIN — HYDROMORPHONE HYDROCHLORIDE 0.5 MG: 1 INJECTION, SOLUTION INTRAMUSCULAR; INTRAVENOUS; SUBCUTANEOUS at 09:40

## 2025-01-26 RX ADMIN — INSULIN HUMAN 8 UNITS: 100 INJECTION, SOLUTION PARENTERAL at 00:48

## 2025-01-26 RX ADMIN — HYDROMORPHONE HYDROCHLORIDE 0.5 MG: 1 INJECTION, SOLUTION INTRAMUSCULAR; INTRAVENOUS; SUBCUTANEOUS at 22:02

## 2025-01-26 RX ADMIN — INSULIN GLARGINE 35 UNITS: 100 INJECTION, SOLUTION SUBCUTANEOUS at 08:32

## 2025-01-26 RX ADMIN — GABAPENTIN 300 MG: 300 CAPSULE ORAL at 13:01

## 2025-01-26 RX ADMIN — INSULIN HUMAN 12 UNITS: 100 INJECTION, SOLUTION PARENTERAL at 17:48

## 2025-01-26 NOTE — PLAN OF CARE
Goal Outcome Evaluation:              Outcome Evaluation: Pt a/o, VSS, RA, sat. 95%, c/o pain addressed with PRN meds. Pt c/o diarrhea, MD notified. Continue monitoring.

## 2025-01-26 NOTE — PROGRESS NOTES
[facility] Medicine Services  PROGRESS NOTE    Patient Name: Natalia Arzate  : 1985  MRN: [id number]    Date of Admission: 2025  Primary Care Physician: Bladimir Calle PA-C    Subjective   Subjective     CC:  Resp failure    HPI:  Patient was seen and examined this morning.  Having mild pain in her right upper extremity but improved from yesterday.  Still having diarrhea but slowing down.    Objective   Objective     Vital Signs:   Temp:  [97.6 °F (36.4 °C)-98.9 °F (37.2 °C)] 98.4 °F (36.9 °C)  Heart Rate:  [] 107  Resp:  [16-18] 18  BP: (124-148)/(82-95) 127/91  Flow (L/min) (Oxygen Therapy):  [4] 4     Physical Exam:  General: Chronically ill looking, not in distress, conversant and cooperative  Head: Atraumatic and normocephalic  Eyes: No Icterus. No pallor  Ears:  Ears appear intact with no abnormalities noted  Throat: No oral lesions, no thrush  Neck: Supple, trachea midline  Lungs: Clear to auscultation bilaterally, equal air entry, no wheezing or crackles  Heart:  Normal S1 and S2, no murmur, no gallop, No JVD, no lower extremity swelling  Abdomen:  Soft, no tenderness, no organomegaly, normal bowel sounds, no organomegaly  Extremities: pulses equal bilaterally  Skin: No bleeding, bruising or rash, normal skin turgor and elasticity  Neurologic: Cranial nerves appear intact with no evidence of facial asymmetry, normal motor and sensory functions in all 4 extremities  Psych: Alert and oriented x 3, normal mood neurologic: Oriented x 3,  Cranial Nerves grossly intact to confrontation, speech clear  Skin: No rashes      Results Reviewed:  LAB RESULTS:      Lab 25  0224 25  1603 25  1201 25  1540 25  0658 25  2045 25  1810   WBC 6.05  --  3.90 4.79 4.82  --   --    HEMOGLOBIN 9.7*  --  9.8* 9.6* 8.9*  --   --    HEMATOCRIT 30.1*  --  31.2* 30.9* 28.9*  --   --    PLATELETS 188  --  195 215 210  --   --    NEUTROS ABS  3.57  --  2.43  --   --   --   --    IMMATURE GRANS (ABS) 0.01  --  0.04  --   --   --   --    LYMPHS ABS 1.48  --  0.53*  --   --   --   --    MONOS ABS 0.57  --  0.65  --   --   --   --    EOS ABS 0.38  --  0.22  --   --   --   --    MCV 90.7  --  92.6 93.6 93.5  --   --    CRP 3.58* 2.18*  --   --   --   --   --    HEPARIN ANTI-XA  --   --   --  0.81 0.52 0.65 0.10*         Lab 01/26/25  0224 01/25/25  0627 01/24/25  1603 01/23/25  0453 01/22/25  1201 01/21/25  1540 01/20/25  0658   SODIUM 126*  --  132*  --  131* 133* 131*   POTASSIUM 4.1  --  4.5  --  4.0 3.9 3.7   CHLORIDE 86*  --  90*  --  90* 90* 89*   CO2 27.0  --  28.0  --  29.0 30.0* 30.0*   ANION GAP 13.0  --  14.0  --  12.0 13.0 12.0   BUN 28*  --  22*  --  13 12 14   CREATININE 0.58  --  0.52*  --  0.54* 0.58 0.61   EGFR 119.0  --  122.1  --  121.0 119.0 117.5   GLUCOSE 241*  --  231*  --  215* 207* 194*   CALCIUM 10.8*  --  10.4  --  9.9 9.8 9.5   MAGNESIUM 1.2* 1.8 1.2* 2.3 1.0* 1.8 1.3*   PHOSPHORUS 4.5  --  5.0*  --  4.2 4.2  --          Lab 01/26/25 0224 01/24/25  1603   TOTAL PROTEIN 8.1 8.1   ALBUMIN 4.1 3.9   GLOBULIN 4.0 4.2   ALT (SGPT) 38* 35*   AST (SGOT) 64* 70*   BILIRUBIN 0.6 0.5   ALK PHOS 140* 134*             Lab 01/24/25  1603   CHOLESTEROL 264*   TRIGLYCERIDES 739*                 Brief Urine Lab Results  (Last result in the past 365 days)        Color   Clarity   Blood   Leuk Est   Nitrite   Protein   CREAT   Urine HCG        01/02/25 1213             52.3         01/02/25 1213 Yellow   Cloudy   Moderate (2+)   Negative   Negative   100 mg/dL (2+)                   Microbiology Results Abnormal       Procedure Component Value - Date/Time    Respiratory Culture - Wash, Lung, Left Lower Lobe [[id number]]  (Abnormal)  (Susceptibility) Collected: 01/14/25 1608    Lab Status: Final result Specimen: Wash from Lung, Left Lower Lobe Updated: 01/17/25 1221     Respiratory Culture Light growth (2+) Klebsiella pneumoniae ESBL     Comment:    Consider infectious disease consult.  Susceptibility results may not correlate to clinical outcomes.         Light growth (2+) Staphylococcus aureus, MRSA     Comment:   Considering site of infection and appropriate dosing, oxacillin (or cefoxitin) results can be applied to cefazolin, nafcillin, ampicillin/sulbactam, dicloxacillin, amoxicillin/clavulanate, cephalexin, and cefpodoxime.  Methicillin resistant Staphylococcus aureus, Patient may be an isolation risk.         Rare growth Normal Respiratory Margo     Gram Stain Many (4+) WBCs per low power field      Rare (1+) Epithelial cells per low power field      Rare (1+) Gram positive cocci in pairs and clusters    Susceptibility        Klebsiella pneumoniae ESBL      KELSIE      Ciprofloxacin Resistant      Ertapenem Susceptible      Levofloxacin Resistant      Meropenem Susceptible      Tetracycline Resistant      Trimethoprim + Sulfamethoxazole Resistant                       Susceptibility        Staphylococcus aureus, MRSA      KELSIE      Clindamycin Resistant      Linezolid Susceptible      Oxacillin Resistant      Tetracycline Susceptible      Trimethoprim + Sulfamethoxazole Susceptible      Vancomycin Susceptible                       Susceptibility Comments       Klebsiella pneumoniae ESBL    With the exception of urinary-sourced infections, aminoglycosides should not be used as monotherapy.      Staphylococcus aureus, MRSA    This isolate is presumed to be clindamycin resistant based on detection of inducible clindamycin resistance.  Clindamycin may still be effective in some patients.  This isolate is presumed to be clindamycin resistant based on detection of inducible clindamycin resistance.  Clindamycin may still be effective in some patients.               Respiratory Culture - Sputum, ET Suction [[id number]]  (Abnormal) Collected: 01/14/25 5194    Lab Status: Final result Specimen: Sputum from ET Suction Updated: 01/17/25 1221     Respiratory Culture  Light growth (2+) Klebsiella pneumoniae ESBL     Comment:   Consider infectious disease consult.  Susceptibility results may not correlate to clinical outcomes.         Light growth (2+) Staphylococcus aureus, MRSA     Comment:   Considering site of infection and appropriate dosing, oxacillin (or cefoxitin) results can be applied to cefazolin, nafcillin, ampicillin/sulbactam, dicloxacillin, amoxicillin/clavulanate, cephalexin, and cefpodoxime.  Methicillin resistant Staphylococcus aureus, Patient may be an isolation risk.        Gram Stain Many (4+) WBCs per low power field      Rare (1+) Epithelial cells per low power field      Few (2+) Gram positive cocci in pairs, chains and clusters    Narrative:      Refer to LLL Wash culture for MICS.     Blood Culture - Blood, Blood, Arterial Line [[id number]]  (Abnormal) Collected: 01/14/25 1434    Lab Status: Final result Specimen: Blood, Arterial Line Updated: 01/17/25 0636     Blood Culture Staphylococcus, coagulase negative     Isolated from Anaerobic Bottle     Gram Stain Anaerobic Bottle Gram positive cocci in pairs and clusters    Narrative:      Probable contaminant requires clinical correlation, susceptibility not performed unless requested by physician.      Blood Culture ID, PCR - Blood, Blood, Arterial Line [[id number]]  (Abnormal) Collected: 01/14/25 1434    Lab Status: Final result Specimen: Blood, Arterial Line Updated: 01/16/25 0657     BCID, PCR Staph spp, not aureus or lugdunensis. Identification by BCID2 PCR.     BOTTLE TYPE Anaerobic Bottle    Blood Culture - Blood, Arm, Left [[id number]]  (Abnormal) Collected: 01/05/25 1305    Lab Status: Final result Specimen: Blood from Arm, Left Updated: 01/07/25 1100     Blood Culture Staphylococcus, coagulase negative     Isolated from Anaerobic Bottle     Gram Stain Anaerobic Bottle Gram positive cocci in clusters    Narrative:      Less than seven (7) mL's of blood was collected.  Insufficient quantity may yield  false negative results.  Probable contaminant requires clinical correlation, susceptibility not performed unless requested by physician.    Blood Culture ID, PCR - Blood, Arm, Left [[id number]]  (Abnormal) Collected: 01/05/25 1305    Lab Status: Final result Specimen: Blood from Arm, Left Updated: 01/06/25 1435     BCID, PCR Staph spp, not aureus or lugdunensis. Identification by BCID2 PCR.     BOTTLE TYPE Anaerobic Bottle    Respiratory Culture - Bronchial Wash, Lung, Left Lower Lobe [[id number]]  (Abnormal)  (Susceptibility) Collected: 01/04/25 0755    Lab Status: Final result Specimen: Bronchial Wash from Lung, Left Lower Lobe Updated: 01/06/25 0908     Respiratory Culture Scant growth (1+) Staphylococcus aureus, MRSA     Comment:   Methicillin resistant Staphylococcus aureus, Patient may be an isolation risk.         No Normal Respiratory Margo     Gram Stain Moderate (3+) WBCs seen      Rare (1+) Gram positive cocci in pairs and clusters    Susceptibility        Staphylococcus aureus, MRSA      KELSIE      Clindamycin Resistant      Linezolid Susceptible      Oxacillin Resistant      Tetracycline Susceptible      Trimethoprim + Sulfamethoxazole Susceptible      Vancomycin Susceptible                       Susceptibility Comments       Staphylococcus aureus, MRSA    This isolate is presumed to be clindamycin resistant based on detection of inducible clindamycin resistance.  Clindamycin may still be effective in some patients.  This isolate is presumed to be clindamycin resistant based on detection of inducible clindamycin resistance.  Clindamycin may still be effective in some patients.               Respiratory Culture - Sputum, ET Suction [[id number]]  (Abnormal)  (Susceptibility) Collected: 01/02/25 1212    Lab Status: Final result Specimen: Sputum from ET Suction Updated: 01/04/25 0939     Respiratory Culture Moderate growth (3+) Staphylococcus aureus, MRSA      No Normal Respiratory Margo     Gram Stain Many (4+)  WBCs per low power field      Rare (1+) Epithelial cells per low power field      Many (4+) Gram positive cocci in pairs and clusters    Susceptibility        Staphylococcus aureus, MRSA      KELSIE      Clindamycin Resistant      Linezolid Susceptible      Oxacillin Resistant      Tetracycline Susceptible      Trimethoprim + Sulfamethoxazole Susceptible      Vancomycin Susceptible                       Susceptibility Comments       Staphylococcus aureus, MRSA    This isolate is presumed to be clindamycin resistant based on detection of inducible clindamycin resistance.  Clindamycin may still be effective in some patients.               MRSA Screen, PCR (Inpatient) - Swab, Nares [[id number]]  (Abnormal) Collected: 01/02/25 1246    Lab Status: Final result Specimen: Swab from Nares Updated: 01/02/25 1504     MRSA PCR Positive    Narrative:      The negative predictive value of this diagnostic test is high and should only be used to consider de-escalating anti-MRSA therapy. A positive result may indicate colonization with MRSA and must be correlated clinically.            No radiology results from the last 24 hrs    Results for orders placed during the hospital encounter of 01/02/25    Adult Transthoracic Echo Complete W/ Cont if Necessary Per Protocol    Interpretation Summary    Left ventricular systolic function is normal. Calculated left ventricular EF = 59.3% Left ventricular ejection fraction appears to be 61 - 65%.    Left ventricular wall thickness is consistent with borderline concentric hypertrophy.    Left ventricular diastolic function was normal.    Mild aortic valve stenosis is present.    Peak velocity of the flow distal to the aortic valve is 292 cm/s. Aortic valve mean pressure gradient is 20 mmHg.    Estimated right ventricular systolic pressure from tricuspid regurgitation is normal (<35 mmHg).      Current medications:  Scheduled Meds:amitriptyline, 25 mg, Nasogastric, Nightly  amLODIPine, 10 mg,  Nasogastric, Daily  bumetanide, 1 mg, Intravenous, Daily  castor oil-balsam peru, 1 Application, Topical, Q12H  cholestyramine light, 1 packet, Oral, Q8H  DULoxetine, 30 mg, Oral, Nightly  DULoxetine, 60 mg, Oral, Daily  enoxaparin, 120 mg, Subcutaneous, Q12H  gabapentin, 300 mg, Nasogastric, Q8H  insulin glargine, 35 Units, Subcutaneous, Q12H  insulin regular, 12 Units, Subcutaneous, Q6H  insulin regular, 4-24 Units, Subcutaneous, Q6H  lactobacillus acidophilus, 1 capsule, Nasogastric, Daily  magnesium sulfate, 2 g, Intravenous, Q2H  metoclopramide, 10 mg, Intravenous, Q6H  metoprolol succinate XL, 50 mg, Oral, BID  nystatin, , Topical, Q12H      Continuous Infusions:   PRN Meds:.  acetaminophen    dextrose    glucagon (human recombinant)    HYDROcodone-acetaminophen    HYDROmorphone    ipratropium    ipratropium-albuterol    loperamide    Magnesium Standard Dose Replacement - Follow Nurse / BPA Driven Protocol    meclizine    midazolam    ondansetron    oxyCODONE    Polyvinyl Alcohol-Povidone PF    Potassium Replacement - Follow Nurse / BPA Driven Protocol    sodium chloride    sodium chloride    Assessment & Plan   Assessment & Plan     Active Hospital Problems    Diagnosis  POA    **Acute respiratory failure with hypercapnia [J96.02]  Yes    Sepsis [A41.9]  Yes    Type 2 diabetes mellitus with hyperglycemia [E11.65]  Yes    Hypothyroid [E03.9]  Yes    Factor 5 Leiden mutation, heterozygous [D68.51]  Yes    MARIAA (acute kidney injury) [N17.9]  Yes    Primary hypertension [I10]  Yes    PTSD (post-traumatic stress disorder) [F43.10]  Yes    History of DVT in adulthood [Z86.718]  Not Applicable      Resolved Hospital Problems   No resolved problems to display.        Brief Hospital Course to date:  Natalia Arzate is a 39 y.o. female with a history of HLD, Hypothyroidism, Afib, PE/DVT, Factor V Leiden mutation, and stroke who presented to [facility] [place] with altered mental status. Labs there were significant for renal  failure, hyperglycemia, and hypercapnic respiratory failure. Dx with  pneumonia and resp failure.  She was intubated and required the initiation of vasopressors. She was transferred to [facility] on 1/2/25 for ongoing care.    Acute hypoxic resp failure, improving  Severe sepsis with end organ failure- renal failure and resp failure, improved  MRSA PNA--s/p vanc/linezolid  ESBL Klebsiella PNA  S/p intubation, extubated from 1/1/2025 till 1/17/2025  Pt underwent bronch on 1/14 with significant mucus plugging, post CXR with improvement in L lung aeration.  S/P merrem, vancomycin and linezolid for ESBL pneumonia and MRSA pneumonia respectively  Currently on regular NC--cont pulmonary toilet, wean O2 as tolerated    Gemella sanguinis Bacteremia  1 bottle Gemella sanguinis--unclear significance  S/p 10 days amp/unasyn.    Hypoosmolar hyponatremia  Secondary to ongoing diuretic therapy with Bumex and diarrhea  Hold Bumex IV albumin and monitor BMP with a.m. labs    MARIAA due to severe sepsis  S/P L nephrectomy in 2022  Pt required CRRT ~ 1/8/25-1/11/25   Renal function improving, no need for further HD    Severe dysphagia  Currently with keofeed getting tube feeds.  Ok for 4oz puree 3x/day.  Continue Zofran, reglan.  Will also try meclizine, as having some vertigo sx.  Speech therapy following    Right arm pain  X-rays to forearm and humerus neg for fracture  PRN dilaudid IV  Pt is not able to fit in MRI  Doppler ultrasound right upper extremity with no evidence of DVT    Factor V Leiden mutation  History of PE/DVT  History of stroke  Continue home lovenox    DM  A1c 9.1%  Continue Lantus and regular insulin while on tube feeds.    Hypertension  History A-fib  Restarted home metoprolol, norvasc  Continue lovenox    Diarrhea  C diff neg  Continue probiotic  Likely 2/2 abx and tube feeds  Continue Imodium prn  Add cholestyramine and metamucil    History of PTSD  Continue home cymbalta and elavil    Expected Discharge Location and  Transportation: To be determined  Expected Discharge   Expected Discharge Date: 1/27/2025; Expected Discharge Time:      VTE Prophylaxis:  Pharmacologic & mechanical VTE prophylaxis orders are present.         AM-PAC 6 Clicks Score (PT): 8 (01/25/25 0824)    CODE STATUS:   Code Status and Medical Interventions: CPR (Attempt to Resuscitate); Full Support   Ordered at: 01/02/25 1952     Code Status (Patient has no pulse and is not breathing):    CPR (Attempt to Resuscitate)     Medical Interventions (Patient has pulse or is breathing):    Full Support       Soo Casas MD  01/26/25

## 2025-01-27 ENCOUNTER — ANCILLARY PROCEDURE (OUTPATIENT)
Dept: SPEECH THERAPY | Facility: HOSPITAL | Age: 40
DRG: 870 | End: 2025-01-27
Payer: COMMERCIAL

## 2025-01-27 ENCOUNTER — APPOINTMENT (OUTPATIENT)
Dept: CT IMAGING | Facility: HOSPITAL | Age: 40
DRG: 870 | End: 2025-01-27
Payer: COMMERCIAL

## 2025-01-27 LAB
ANION GAP SERPL CALCULATED.3IONS-SCNC: 16 MMOL/L (ref 5–15)
BASOPHILS # BLD AUTO: 0.06 10*3/MM3 (ref 0–0.2)
BASOPHILS NFR BLD AUTO: 1.1 % (ref 0–1.5)
BUN SERPL-MCNC: 36 MG/DL (ref 6–20)
BUN/CREAT SERPL: 57.1 (ref 7–25)
CALCIUM SPEC-SCNC: 10.8 MG/DL (ref 8.6–10.5)
CHLORIDE SERPL-SCNC: 91 MMOL/L (ref 98–107)
CO2 SERPL-SCNC: 24 MMOL/L (ref 22–29)
CREAT SERPL-MCNC: 0.63 MG/DL (ref 0.57–1)
DEPRECATED RDW RBC AUTO: 54.9 FL (ref 37–54)
EGFRCR SERPLBLD CKD-EPI 2021: 116.6 ML/MIN/1.73
EOSINOPHIL # BLD AUTO: 0.33 10*3/MM3 (ref 0–0.4)
EOSINOPHIL NFR BLD AUTO: 5.9 % (ref 0.3–6.2)
ERYTHROCYTE [DISTWIDTH] IN BLOOD BY AUTOMATED COUNT: 16.9 % (ref 12.3–15.4)
GLUCOSE BLDC GLUCOMTR-MCNC: 199 MG/DL (ref 70–130)
GLUCOSE BLDC GLUCOMTR-MCNC: 205 MG/DL (ref 70–130)
GLUCOSE BLDC GLUCOMTR-MCNC: 210 MG/DL (ref 70–130)
GLUCOSE BLDC GLUCOMTR-MCNC: 229 MG/DL (ref 70–130)
GLUCOSE BLDC GLUCOMTR-MCNC: 246 MG/DL (ref 70–130)
GLUCOSE SERPL-MCNC: 235 MG/DL (ref 65–99)
HCT VFR BLD AUTO: 27.6 % (ref 34–46.6)
HGB BLD-MCNC: 9 G/DL (ref 12–15.9)
IMM GRANULOCYTES # BLD AUTO: 0.02 10*3/MM3 (ref 0–0.05)
IMM GRANULOCYTES NFR BLD AUTO: 0.4 % (ref 0–0.5)
LYMPHOCYTES # BLD AUTO: 1.47 10*3/MM3 (ref 0.7–3.1)
LYMPHOCYTES NFR BLD AUTO: 26.3 % (ref 19.6–45.3)
MAGNESIUM SERPL-MCNC: 1.6 MG/DL (ref 1.6–2.6)
MCH RBC QN AUTO: 29.4 PG (ref 26.6–33)
MCHC RBC AUTO-ENTMCNC: 32.6 G/DL (ref 31.5–35.7)
MCV RBC AUTO: 90.2 FL (ref 79–97)
MONOCYTES # BLD AUTO: 0.52 10*3/MM3 (ref 0.1–0.9)
MONOCYTES NFR BLD AUTO: 9.3 % (ref 5–12)
NEUTROPHILS NFR BLD AUTO: 3.18 10*3/MM3 (ref 1.7–7)
NEUTROPHILS NFR BLD AUTO: 57 % (ref 42.7–76)
NRBC BLD AUTO-RTO: 0 /100 WBC (ref 0–0.2)
PLATELET # BLD AUTO: 167 10*3/MM3 (ref 140–450)
PMV BLD AUTO: 9.6 FL (ref 6–12)
POTASSIUM SERPL-SCNC: 4.7 MMOL/L (ref 3.5–5.2)
RBC # BLD AUTO: 3.06 10*6/MM3 (ref 3.77–5.28)
SODIUM SERPL-SCNC: 131 MMOL/L (ref 136–145)
WBC NRBC COR # BLD AUTO: 5.58 10*3/MM3 (ref 3.4–10.8)

## 2025-01-27 PROCEDURE — 25510000001 IOPAMIDOL 61 % SOLUTION: Performed by: INTERNAL MEDICINE

## 2025-01-27 PROCEDURE — 73201 CT UPPER EXTREMITY W/DYE: CPT

## 2025-01-27 PROCEDURE — 25010000002 ALBUMIN HUMAN 25% PER 50 ML: Performed by: INTERNAL MEDICINE

## 2025-01-27 PROCEDURE — 63710000001 INSULIN GLARGINE PER 5 UNITS: Performed by: PEDIATRICS

## 2025-01-27 PROCEDURE — 25010000002 ENOXAPARIN PER 10 MG: Performed by: INTERNAL MEDICINE

## 2025-01-27 PROCEDURE — 97116 GAIT TRAINING THERAPY: CPT

## 2025-01-27 PROCEDURE — 63710000001 INSULIN LISPRO (HUMAN) PER 5 UNITS

## 2025-01-27 PROCEDURE — 25010000002 HYDROMORPHONE PER 4 MG: Performed by: INTERNAL MEDICINE

## 2025-01-27 PROCEDURE — 63710000001 INSULIN REGULAR HUMAN PER 5 UNITS: Performed by: PEDIATRICS

## 2025-01-27 PROCEDURE — 92610 EVALUATE SWALLOWING FUNCTION: CPT | Performed by: SPEECH-LANGUAGE PATHOLOGIST

## 2025-01-27 PROCEDURE — 25010000002 METOCLOPRAMIDE PER 10 MG: Performed by: PEDIATRICS

## 2025-01-27 PROCEDURE — 85025 COMPLETE CBC W/AUTO DIFF WBC: CPT | Performed by: INTERNAL MEDICINE

## 2025-01-27 PROCEDURE — P9047 ALBUMIN (HUMAN), 25%, 50ML: HCPCS | Performed by: INTERNAL MEDICINE

## 2025-01-27 PROCEDURE — 63710000001 INSULIN REGULAR HUMAN PER 5 UNITS: Performed by: NURSE PRACTITIONER

## 2025-01-27 PROCEDURE — 82948 REAGENT STRIP/BLOOD GLUCOSE: CPT

## 2025-01-27 PROCEDURE — 99232 SBSQ HOSP IP/OBS MODERATE 35: CPT | Performed by: NURSE PRACTITIONER

## 2025-01-27 PROCEDURE — 80048 BASIC METABOLIC PNL TOTAL CA: CPT | Performed by: INTERNAL MEDICINE

## 2025-01-27 PROCEDURE — 92612 ENDOSCOPY SWALLOW (FEES) VID: CPT | Performed by: SPEECH-LANGUAGE PATHOLOGIST

## 2025-01-27 PROCEDURE — 63710000001 INSULIN REGULAR HUMAN PER 5 UNITS: Performed by: INTERNAL MEDICINE

## 2025-01-27 PROCEDURE — 97530 THERAPEUTIC ACTIVITIES: CPT

## 2025-01-27 PROCEDURE — 83735 ASSAY OF MAGNESIUM: CPT | Performed by: INTERNAL MEDICINE

## 2025-01-27 RX ORDER — L.ACID,PARA/B.BIFIDUM/S.THERM 8B CELL
1 CAPSULE ORAL DAILY
Status: DISCONTINUED | OUTPATIENT
Start: 2025-01-28 | End: 2025-02-03 | Stop reason: HOSPADM

## 2025-01-27 RX ORDER — INSULIN LISPRO 100 [IU]/ML
4-24 INJECTION, SOLUTION INTRAVENOUS; SUBCUTANEOUS
Status: DISCONTINUED | OUTPATIENT
Start: 2025-01-27 | End: 2025-02-03 | Stop reason: HOSPADM

## 2025-01-27 RX ORDER — ACETAMINOPHEN 160 MG/5ML
650 SOLUTION ORAL EVERY 6 HOURS PRN
Status: DISCONTINUED | OUTPATIENT
Start: 2025-01-27 | End: 2025-02-01

## 2025-01-27 RX ORDER — GABAPENTIN 300 MG/1
300 CAPSULE ORAL EVERY 8 HOURS SCHEDULED
Status: DISCONTINUED | OUTPATIENT
Start: 2025-01-27 | End: 2025-02-03 | Stop reason: HOSPADM

## 2025-01-27 RX ORDER — AMLODIPINE BESYLATE 10 MG/1
10 TABLET ORAL DAILY
Status: DISCONTINUED | OUTPATIENT
Start: 2025-01-28 | End: 2025-02-03 | Stop reason: HOSPADM

## 2025-01-27 RX ORDER — DEXTROSE MONOHYDRATE 25 G/50ML
25 INJECTION, SOLUTION INTRAVENOUS
Status: DISCONTINUED | OUTPATIENT
Start: 2025-01-27 | End: 2025-02-03 | Stop reason: HOSPADM

## 2025-01-27 RX ORDER — HYDROXYZINE HYDROCHLORIDE 25 MG/1
25 TABLET, FILM COATED ORAL NIGHTLY
Status: DISCONTINUED | OUTPATIENT
Start: 2025-01-27 | End: 2025-02-03 | Stop reason: HOSPADM

## 2025-01-27 RX ORDER — NICOTINE POLACRILEX 4 MG
15 LOZENGE BUCCAL
Status: DISCONTINUED | OUTPATIENT
Start: 2025-01-27 | End: 2025-02-03 | Stop reason: HOSPADM

## 2025-01-27 RX ORDER — LOPERAMIDE HYDROCHLORIDE 2 MG/1
2 CAPSULE ORAL EVERY 4 HOURS PRN
Status: DISCONTINUED | OUTPATIENT
Start: 2025-01-27 | End: 2025-02-03 | Stop reason: HOSPADM

## 2025-01-27 RX ORDER — INSULIN LISPRO 100 [IU]/ML
15 INJECTION, SOLUTION INTRAVENOUS; SUBCUTANEOUS
Status: DISCONTINUED | OUTPATIENT
Start: 2025-01-28 | End: 2025-01-29

## 2025-01-27 RX ORDER — IBUPROFEN 600 MG/1
1 TABLET ORAL
Status: DISCONTINUED | OUTPATIENT
Start: 2025-01-27 | End: 2025-02-03 | Stop reason: HOSPADM

## 2025-01-27 RX ORDER — GUAR GUM
1 PACKET (EA) ORAL 3 TIMES DAILY
Status: DISCONTINUED | OUTPATIENT
Start: 2025-01-27 | End: 2025-02-03

## 2025-01-27 RX ORDER — HYDROCODONE BITARTRATE AND ACETAMINOPHEN 5; 325 MG/1; MG/1
1 TABLET ORAL EVERY 4 HOURS PRN
Status: DISCONTINUED | OUTPATIENT
Start: 2025-01-27 | End: 2025-02-01

## 2025-01-27 RX ORDER — IOPAMIDOL 612 MG/ML
95 INJECTION, SOLUTION INTRAVASCULAR
Status: COMPLETED | OUTPATIENT
Start: 2025-01-27 | End: 2025-01-27

## 2025-01-27 RX ADMIN — OXYCODONE HYDROCHLORIDE 5 MG: 5 SOLUTION ORAL at 20:04

## 2025-01-27 RX ADMIN — METOPROLOL SUCCINATE 50 MG: 50 TABLET, EXTENDED RELEASE ORAL at 20:04

## 2025-01-27 RX ADMIN — DULOXETINE HYDROCHLORIDE 60 MG: 60 CAPSULE, DELAYED RELEASE ORAL at 09:51

## 2025-01-27 RX ADMIN — HYDROXYZINE HYDROCHLORIDE 25 MG: 25 TABLET ORAL at 20:04

## 2025-01-27 RX ADMIN — ENOXAPARIN SODIUM 120 MG: 120 INJECTION SUBCUTANEOUS at 00:16

## 2025-01-27 RX ADMIN — GABAPENTIN 300 MG: 300 CAPSULE ORAL at 20:04

## 2025-01-27 RX ADMIN — ENOXAPARIN SODIUM 120 MG: 120 INJECTION SUBCUTANEOUS at 12:38

## 2025-01-27 RX ADMIN — CHOLESTYRAMINE 4 G: 4 POWDER, FOR SUSPENSION ORAL at 13:58

## 2025-01-27 RX ADMIN — INSULIN HUMAN 12 UNITS: 100 INJECTION, SOLUTION PARENTERAL at 00:15

## 2025-01-27 RX ADMIN — AMLODIPINE BESYLATE 10 MG: 10 TABLET ORAL at 09:51

## 2025-01-27 RX ADMIN — INSULIN HUMAN 8 UNITS: 100 INJECTION, SOLUTION PARENTERAL at 05:44

## 2025-01-27 RX ADMIN — HYDROMORPHONE HYDROCHLORIDE 0.5 MG: 1 INJECTION, SOLUTION INTRAMUSCULAR; INTRAVENOUS; SUBCUTANEOUS at 09:50

## 2025-01-27 RX ADMIN — METOPROLOL SUCCINATE 50 MG: 50 TABLET, EXTENDED RELEASE ORAL at 09:51

## 2025-01-27 RX ADMIN — METOCLOPRAMIDE HYDROCHLORIDE 10 MG: 10 INJECTION, SOLUTION INTRAMUSCULAR; INTRAVENOUS at 12:38

## 2025-01-27 RX ADMIN — Medication 1 APPLICATION: at 20:17

## 2025-01-27 RX ADMIN — INSULIN HUMAN 8 UNITS: 100 INJECTION, SOLUTION PARENTERAL at 13:59

## 2025-01-27 RX ADMIN — DULOXETINE HYDROCHLORIDE 30 MG: 30 CAPSULE, DELAYED RELEASE ORAL at 20:04

## 2025-01-27 RX ADMIN — OXYCODONE HYDROCHLORIDE 5 MG: 5 SOLUTION ORAL at 16:05

## 2025-01-27 RX ADMIN — INSULIN HUMAN 8 UNITS: 100 INJECTION, SOLUTION PARENTERAL at 18:26

## 2025-01-27 RX ADMIN — AMITRIPTYLINE HYDROCHLORIDE 25 MG: 25 TABLET, FILM COATED ORAL at 20:03

## 2025-01-27 RX ADMIN — METOCLOPRAMIDE HYDROCHLORIDE 10 MG: 10 INJECTION, SOLUTION INTRAMUSCULAR; INTRAVENOUS at 20:17

## 2025-01-27 RX ADMIN — GABAPENTIN 300 MG: 300 CAPSULE ORAL at 05:44

## 2025-01-27 RX ADMIN — INSULIN HUMAN 15 UNITS: 100 INJECTION, SOLUTION PARENTERAL at 18:25

## 2025-01-27 RX ADMIN — INSULIN HUMAN 15 UNITS: 100 INJECTION, SOLUTION PARENTERAL at 13:58

## 2025-01-27 RX ADMIN — AVOBENZONE, HOMOSALATE, OCTISALATE, OCTOCRYLENE, AND OXYBENZONE 1 PACKET: 29.4; 147; 49; 25.4; 58.8 LOTION TOPICAL at 20:17

## 2025-01-27 RX ADMIN — INSULIN GLARGINE 35 UNITS: 100 INJECTION, SOLUTION SUBCUTANEOUS at 09:51

## 2025-01-27 RX ADMIN — INSULIN GLARGINE 35 UNITS: 100 INJECTION, SOLUTION SUBCUTANEOUS at 20:18

## 2025-01-27 RX ADMIN — ALBUMIN (HUMAN) 50 G: 0.25 INJECTION, SOLUTION INTRAVENOUS at 10:01

## 2025-01-27 RX ADMIN — HYDROMORPHONE HYDROCHLORIDE 0.5 MG: 1 INJECTION, SOLUTION INTRAMUSCULAR; INTRAVENOUS; SUBCUTANEOUS at 03:14

## 2025-01-27 RX ADMIN — Medication 10 ML: at 09:50

## 2025-01-27 RX ADMIN — INSULIN HUMAN 4 UNITS: 100 INJECTION, SOLUTION PARENTERAL at 00:15

## 2025-01-27 RX ADMIN — INSULIN HUMAN 12 UNITS: 100 INJECTION, SOLUTION PARENTERAL at 05:44

## 2025-01-27 RX ADMIN — GABAPENTIN 300 MG: 300 CAPSULE ORAL at 13:58

## 2025-01-27 RX ADMIN — IOPAMIDOL 95 ML: 612 INJECTION, SOLUTION INTRAVENOUS at 11:55

## 2025-01-27 RX ADMIN — METOCLOPRAMIDE HYDROCHLORIDE 10 MG: 10 INJECTION, SOLUTION INTRAMUSCULAR; INTRAVENOUS at 05:44

## 2025-01-27 RX ADMIN — CHOLESTYRAMINE 4 G: 4 POWDER, FOR SUSPENSION ORAL at 05:44

## 2025-01-27 RX ADMIN — NYSTATIN: 100000 POWDER TOPICAL at 20:17

## 2025-01-27 RX ADMIN — Medication 5 MG: at 20:03

## 2025-01-27 RX ADMIN — HYDROMORPHONE HYDROCHLORIDE 0.5 MG: 1 INJECTION, SOLUTION INTRAMUSCULAR; INTRAVENOUS; SUBCUTANEOUS at 17:26

## 2025-01-27 RX ADMIN — Medication 1 APPLICATION: at 09:52

## 2025-01-27 RX ADMIN — INSULIN LISPRO 8 UNITS: 100 INJECTION, SOLUTION INTRAVENOUS; SUBCUTANEOUS at 20:18

## 2025-01-27 RX ADMIN — Medication 1 CAPSULE: at 09:51

## 2025-01-27 RX ADMIN — METOCLOPRAMIDE HYDROCHLORIDE 10 MG: 10 INJECTION, SOLUTION INTRAMUSCULAR; INTRAVENOUS at 17:28

## 2025-01-27 RX ADMIN — AVOBENZONE, HOMOSALATE, OCTISALATE, OCTOCRYLENE, AND OXYBENZONE 1 PACKET: 29.4; 147; 49; 25.4; 58.8 LOTION TOPICAL at 09:51

## 2025-01-27 RX ADMIN — NYSTATIN: 100000 POWDER TOPICAL at 09:51

## 2025-01-27 RX ADMIN — HYDROXYZINE HYDROCHLORIDE 25 MG: 25 TABLET ORAL at 00:16

## 2025-01-27 RX ADMIN — OXYCODONE HYDROCHLORIDE 5 MG: 5 SOLUTION ORAL at 05:58

## 2025-01-27 NOTE — PROGRESS NOTES
[facility] Medicine Services  PROGRESS NOTE    Patient Name: Natalia Arzate  : 1985  MRN: [id number]    Date of Admission: 2025  Primary Care Physician: Bladimir Calle PA-C    Subjective   Subjective     CC:  Resp failure    HPI:  Seen resting up in bed alert.  No acute distress.  Chronically ill-appearing.  Fiancee sleeping in the chair at bedside.  Patient reports continued diarrhea and nausea.  Rectal tube remains in place.  N.p.o. with Keofeed and tube feed infusing.  Right arm and back pain rated 9/10 scale.  She did not sleep well last night and needs something to help her rest.    Objective   Objective     Vital Signs:   Temp:  [97 °F (36.1 °C)-98.4 °F (36.9 °C)] 97.6°F (36.9 °C)  Heart Rate:  [] 91  Resp:  [18] 18  BP: (127-132)/() 127/81     Physical Exam:  General: Chronically ill looking, not in distress, conversant and cooperative  HEENT: NCAT, no gross abnormalities  Lungs: Clear to auscultation bilaterally but generally distant and decreased at bases, likely due to body habitus, no wheezing or crackles.  Heart: RRR, normal S1 and S2, no murmur, no gallop  Abdomen: Soft, no tenderness, normal bowel sounds, nondistended.  Morbidly obese.  Rectal tube in place to bedside drain bag with thin/watery tan stool  Extremities: pulses equal bilaterally.  ROBISON spontaneously.  Skin: No bleeding, bruising or rash, normal skin turgor and elasticity.  Left IJ DLDL in place with dressing dry and intact.  Neurologic: Awake and alert.  Oriented x 3.  Cranial nerves appear intact with no evidence of facial asymmetry, normal motor and sensory functions in all 4 extremities.  Speech clear and appropriate.  Psych: normal mood, calm and cooperative       Results Reviewed:  LAB RESULTS:      Lab 25  0554 25  0702 25  0552 25  0224 25  1603 25  1201 25  1540   WBC 5.58  --   --  6.05  --  3.90 4.79   HEMOGLOBIN 9.0*  --   --   9.7*  --  9.8* 9.6*   HEMATOCRIT 27.6*  --   --  30.1*  --  31.2* 30.9*   PLATELETS 167  --   --  188  --  195 215   NEUTROS ABS 3.18  --   --  3.57  --  2.43  --    IMMATURE GRANS (ABS) 0.02  --   --  0.01  --  0.04  --    LYMPHS ABS 1.47  --   --  1.48  --  0.53*  --    MONOS ABS 0.52  --   --  0.57  --  0.65  --    EOS ABS 0.33  --   --  0.38  --  0.22  --    MCV 90.2  --   --  90.7  --  92.6 93.6   CRP  --   --   --  3.58* 2.18*  --   --    HEPARIN ANTI-XA  --   --   --   --   --   --  0.81   HSTROP T  --  36* 34*  --   --   --   --          Lab 01/27/25  0554 01/26/25  0224 01/25/25  0627 01/24/25  1603 01/23/25  0453 01/22/25  1201 01/21/25  1540   SODIUM 131* 126*  --  132*  --  131* 133*   POTASSIUM 4.7 4.1  --  4.5  --  4.0 3.9   CHLORIDE 91* 86*  --  90*  --  90* 90*   CO2 24.0 27.0  --  28.0  --  29.0 30.0*   ANION GAP 16.0* 13.0  --  14.0  --  12.0 13.0   BUN 36* 28*  --  22*  --  13 12   CREATININE 0.63 0.58  --  0.52*  --  0.54* 0.58   EGFR 116.6 119.0  --  122.1  --  121.0 119.0   GLUCOSE 235* 241*  --  231*  --  215* 207*   CALCIUM 10.8* 10.8*  --  10.4  --  9.9 9.8   MAGNESIUM 1.6 1.2* 1.8 1.2* 2.3 1.0* 1.8   PHOSPHORUS  --  4.5  --  5.0*  --  4.2 4.2         Lab 01/26/25  0224 01/24/25  1603   TOTAL PROTEIN 8.1 8.1   ALBUMIN 4.1 3.9   GLOBULIN 4.0 4.2   ALT (SGPT) 38* 35*   AST (SGOT) 64* 70*   BILIRUBIN 0.6 0.5   ALK PHOS 140* 134*         Lab 01/26/25  0702 01/26/25  0552   HSTROP T 36* 34*         Lab 01/24/25  1603   CHOLESTEROL 264*   TRIGLYCERIDES 739*                 Brief Urine Lab Results  (Last result in the past 365 days)        Color   Clarity   Blood   Leuk Est   Nitrite   Protein   CREAT   Urine HCG        01/02/25 1213             52.3         01/02/25 1213 Yellow   Cloudy   Moderate (2+)   Negative   Negative   100 mg/dL (2+)                   Microbiology Results Abnormal       Procedure Component Value - Date/Time    Respiratory Culture - Wash, Lung, Left Lower Lobe [[id number]]   (Abnormal)  (Susceptibility) Collected: 01/14/25 9031    Lab Status: Final result Specimen: Wash from Lung, Left Lower Lobe Updated: 01/17/25 1221     Respiratory Culture Light growth (2+) Klebsiella pneumoniae ESBL     Comment:   Consider infectious disease consult.  Susceptibility results may not correlate to clinical outcomes.         Light growth (2+) Staphylococcus aureus, MRSA     Comment:   Considering site of infection and appropriate dosing, oxacillin (or cefoxitin) results can be applied to cefazolin, nafcillin, ampicillin/sulbactam, dicloxacillin, amoxicillin/clavulanate, cephalexin, and cefpodoxime.  Methicillin resistant Staphylococcus aureus, Patient may be an isolation risk.         Rare growth Normal Respiratory Margo     Gram Stain Many (4+) WBCs per low power field      Rare (1+) Epithelial cells per low power field      Rare (1+) Gram positive cocci in pairs and clusters    Susceptibility        Klebsiella pneumoniae ESBL      KELSIE      Ciprofloxacin Resistant      Ertapenem Susceptible      Levofloxacin Resistant      Meropenem Susceptible      Tetracycline Resistant      Trimethoprim + Sulfamethoxazole Resistant                       Susceptibility        Staphylococcus aureus, MRSA      KELSIE      Clindamycin Resistant      Linezolid Susceptible      Oxacillin Resistant      Tetracycline Susceptible      Trimethoprim + Sulfamethoxazole Susceptible      Vancomycin Susceptible                       Susceptibility Comments       Klebsiella pneumoniae ESBL    With the exception of urinary-sourced infections, aminoglycosides should not be used as monotherapy.      Staphylococcus aureus, MRSA    This isolate is presumed to be clindamycin resistant based on detection of inducible clindamycin resistance.  Clindamycin may still be effective in some patients.  This isolate is presumed to be clindamycin resistant based on detection of inducible clindamycin resistance.  Clindamycin may still be effective in  some patients.               Respiratory Culture - Sputum, ET Suction [[id number]]  (Abnormal) Collected: 01/14/25 1857    Lab Status: Final result Specimen: Sputum from ET Suction Updated: 01/17/25 1221     Respiratory Culture Light growth (2+) Klebsiella pneumoniae ESBL     Comment:   Consider infectious disease consult.  Susceptibility results may not correlate to clinical outcomes.         Light growth (2+) Staphylococcus aureus, MRSA     Comment:   Considering site of infection and appropriate dosing, oxacillin (or cefoxitin) results can be applied to cefazolin, nafcillin, ampicillin/sulbactam, dicloxacillin, amoxicillin/clavulanate, cephalexin, and cefpodoxime.  Methicillin resistant Staphylococcus aureus, Patient may be an isolation risk.        Gram Stain Many (4+) WBCs per low power field      Rare (1+) Epithelial cells per low power field      Few (2+) Gram positive cocci in pairs, chains and clusters    Narrative:      Refer to LLL Wash culture for MICS.     Blood Culture - Blood, Blood, Arterial Line [[id number]]  (Abnormal) Collected: 01/14/25 1434    Lab Status: Final result Specimen: Blood, Arterial Line Updated: 01/17/25 0636     Blood Culture Staphylococcus, coagulase negative     Isolated from Anaerobic Bottle     Gram Stain Anaerobic Bottle Gram positive cocci in pairs and clusters    Narrative:      Probable contaminant requires clinical correlation, susceptibility not performed unless requested by physician.      Blood Culture ID, PCR - Blood, Blood, Arterial Line [[id number]]  (Abnormal) Collected: 01/14/25 1434    Lab Status: Final result Specimen: Blood, Arterial Line Updated: 01/16/25 0657     BCID, PCR Staph spp, not aureus or lugdunensis. Identification by BCID2 PCR.     BOTTLE TYPE Anaerobic Bottle    Blood Culture - Blood, Arm, Left [[id number]]  (Abnormal) Collected: 01/05/25 1305    Lab Status: Final result Specimen: Blood from Arm, Left Updated: 01/07/25 1100     Blood Culture  Staphylococcus, coagulase negative     Isolated from Anaerobic Bottle     Gram Stain Anaerobic Bottle Gram positive cocci in clusters    Narrative:      Less than seven (7) mL's of blood was collected.  Insufficient quantity may yield false negative results.  Probable contaminant requires clinical correlation, susceptibility not performed unless requested by physician.    Blood Culture ID, PCR - Blood, Arm, Left [[id number]]  (Abnormal) Collected: 01/05/25 1305    Lab Status: Final result Specimen: Blood from Arm, Left Updated: 01/06/25 1435     BCID, PCR Staph spp, not aureus or lugdunensis. Identification by BCID2 PCR.     BOTTLE TYPE Anaerobic Bottle    Respiratory Culture - Bronchial Wash, Lung, Left Lower Lobe [[id number]]  (Abnormal)  (Susceptibility) Collected: 01/04/25 0755    Lab Status: Final result Specimen: Bronchial Wash from Lung, Left Lower Lobe Updated: 01/06/25 0908     Respiratory Culture Scant growth (1+) Staphylococcus aureus, MRSA     Comment:   Methicillin resistant Staphylococcus aureus, Patient may be an isolation risk.         No Normal Respiratory Margo     Gram Stain Moderate (3+) WBCs seen      Rare (1+) Gram positive cocci in pairs and clusters    Susceptibility        Staphylococcus aureus, MRSA      KELSIE      Clindamycin Resistant      Linezolid Susceptible      Oxacillin Resistant      Tetracycline Susceptible      Trimethoprim + Sulfamethoxazole Susceptible      Vancomycin Susceptible                       Susceptibility Comments       Staphylococcus aureus, MRSA    This isolate is presumed to be clindamycin resistant based on detection of inducible clindamycin resistance.  Clindamycin may still be effective in some patients.  This isolate is presumed to be clindamycin resistant based on detection of inducible clindamycin resistance.  Clindamycin may still be effective in some patients.               Respiratory Culture - Sputum, ET Suction [[id number]]  (Abnormal)  (Susceptibility)  Collected: 01/02/25 1212    Lab Status: Final result Specimen: Sputum from ET Suction Updated: 01/04/25 0939     Respiratory Culture Moderate growth (3+) Staphylococcus aureus, MRSA      No Normal Respiratory Margo     Gram Stain Many (4+) WBCs per low power field      Rare (1+) Epithelial cells per low power field      Many (4+) Gram positive cocci in pairs and clusters    Susceptibility        Staphylococcus aureus, MRSA      KELSIE      Clindamycin Resistant      Linezolid Susceptible      Oxacillin Resistant      Tetracycline Susceptible      Trimethoprim + Sulfamethoxazole Susceptible      Vancomycin Susceptible                       Susceptibility Comments       Staphylococcus aureus, MRSA    This isolate is presumed to be clindamycin resistant based on detection of inducible clindamycin resistance.  Clindamycin may still be effective in some patients.               MRSA Screen, PCR (Inpatient) - Swab, Nares [[id number]]  (Abnormal) Collected: 01/02/25 1246    Lab Status: Final result Specimen: Swab from Nares Updated: 01/02/25 1504     MRSA PCR Positive    Narrative:      The negative predictive value of this diagnostic test is high and should only be used to consider de-escalating anti-MRSA therapy. A positive result may indicate colonization with MRSA and must be correlated clinically.            SLP FEES - Fiberoptic Endo Eval Swallow    Result Date: 1/27/2025  This procedure was auto-finalized with no dictation required.    Duplex Venous Upper Extremity - Right CAR    Result Date: 1/26/2025    Normal right upper extremity venous duplex scan.     XR Chest 1 View    Result Date: 1/26/2025  XR CHEST 1 VW Date of Exam: 1/26/2025 5:55 AM EST Indication: chest pain Comparison: Chest radiograph January 19, 2025 Findings: There is a feeding tube below the diaphragm. There is a left internal jugular central line with the tip in the superior vena cava. Heart is enlarged. The pulmonary vascular markings are normal.  There is a persistent opacity at the left base likely combination of pleural effusion and atelectasis.     Impression: Impression: Persistent left basilar opacity likely combination of pleural fluid and atelectasis. Electronically Signed: Spencer James MD  1/26/2025 6:14 AM EST  Workstation ID: [id number]     Results for orders placed during the hospital encounter of 01/02/25    Adult Transthoracic Echo Complete W/ Cont if Necessary Per Protocol    Interpretation Summary    Left ventricular systolic function is normal. Calculated left ventricular EF = 59.3% Left ventricular ejection fraction appears to be 61 - 65%.    Left ventricular wall thickness is consistent with borderline concentric hypertrophy.    Left ventricular diastolic function was normal.    Mild aortic valve stenosis is present.    Peak velocity of the flow distal to the aortic valve is 292 cm/s. Aortic valve mean pressure gradient is 20 mmHg.    Estimated right ventricular systolic pressure from tricuspid regurgitation is normal (<35 mmHg).      Current medications:  Scheduled Meds:amitriptyline, 25 mg, Nasogastric, Nightly  amLODIPine, 10 mg, Nasogastric, Daily  castor oil-balsam peru, 1 Application, Topical, Q12H  cholestyramine light, 1 packet, Oral, Q8H  DULoxetine, 30 mg, Oral, Nightly  DULoxetine, 60 mg, Oral, Daily  enoxaparin, 120 mg, Subcutaneous, Q12H  gabapentin, 300 mg, Nasogastric, Q8H  insulin glargine, 35 Units, Subcutaneous, Q12H  insulin regular, 15 Units, Subcutaneous, Q6H  insulin regular, 4-24 Units, Subcutaneous, Q6H  lactobacillus acidophilus, 1 capsule, Nasogastric, Daily  metoclopramide, 10 mg, Intravenous, Q6H  metoprolol succinate XL, 50 mg, Oral, BID  Nutrisource fiber, 1 packet, Per G Tube, TID  nystatin, , Topical, Q12H  Psyllium, 1 packet, Oral, BID  [Held by provider] torsemide, 40 mg, Oral, Daily      Continuous Infusions:   PRN Meds:.  acetaminophen    dextrose    glucagon (human recombinant)     HYDROcodone-acetaminophen    HYDROmorphone    hydrOXYzine    ipratropium    ipratropium-albuterol    loperamide    Magnesium Standard Dose Replacement - Follow Nurse / BPA Driven Protocol    meclizine    melatonin    midazolam    ondansetron    oxyCODONE    Polyvinyl Alcohol-Povidone PF    Potassium Replacement - Follow Nurse / BPA Driven Protocol    sodium chloride    sodium chloride    Assessment & Plan   Assessment & Plan     Active Hospital Problems    Diagnosis  POA    **Acute respiratory failure with hypercapnia [J96.02]  Yes    Sepsis [A41.9]  Yes    Type 2 diabetes mellitus with hyperglycemia [E11.65]  Yes    Hypothyroid [E03.9]  Yes    Factor 5 Leiden mutation, heterozygous [D68.51]  Yes    MARIAA (acute kidney injury) [N17.9]  Yes    Primary hypertension [I10]  Yes    PTSD (post-traumatic stress disorder) [F43.10]  Yes    History of DVT in adulthood [Z86.718]  Not Applicable      Resolved Hospital Problems   No resolved problems to display.        Brief Hospital Course to date:  Natalia Arzate is a 39 y.o. female with a history of HLD, Hypothyroidism, Afib, PE/DVT, Factor V Leiden mutation, and stroke who presented to [facility] with altered mental status. Labs there were significant for renal failure, hyperglycemia, and hypercapnic respiratory failure. Dx with  pneumonia and resp failure.  She was intubated and required the initiation of vasopressors. She was transferred to [facility] on 1/2/25 for ongoing care.    This patient's problems and plans were partially entered by my partner and updated as appropriate by me 01/27/25.    Assessment/plan:  Pt is new to me today     Acute hypoxic resp failure, improving  Severe sepsis with end organ failure- renal failure and resp failure, improved  MRSA PNA--s/p vanc/linezolid  ESBL Klebsiella PNA  S/p intubation, extubated from 1/1/2025 till 1/17/2025  Pt underwent bronch on 1/14 with significant mucus plugging, post CXR with improvement in L lung aeration.  S/P merrem,  vancomycin and linezolid for ESBL pneumonia and MRSA pneumonia respectively  Currently on room air--cont pulmonary toilet, oxygen as needed.    Gemella sanguinis Bacteremia  1 bottle Gemella sanguinis--unclear significance  S/p 10 days amp/unasyn.    Hypoosmolar hyponatremia  Secondary to ongoing diuretic therapy with Bumex and diarrhea  Holding Bumex, getting IV albumin and monitor BMP     MARIAA due to severe sepsis  S/P Lt nephrectomy in 2022  Pt required CRRT ~ 1/8/25-1/11/25   Renal function improving, no need for further HD    Severe dysphagia  Currently with keofeed getting tube feeds.  Ok for 4oz puree 3x/day.  Continue Zofran, reglan.  Trial of meclizine, as having some vertigo sx. slight improvement reported  Speech therapy following a modified diet.    Right arm pain  X-rays to forearm and humerus neg for fracture  PRN dilaudid IV  Pt is not able to fit in MRI  Doppler ultrasound right upper extremity with no evidence of DVT    Diarrhea  C diff prior neg  Continue probiotic  Likely 2/2 abx and tube feeds  Continue Imodium prn  Prior added cholestyramine and metamucil  Labs unable to recheck C. difficile due to persistent diarrhea with tube feeds on board.  Spoke with Ritika Mendez RD. she does not feel necessary to change tube feed at this time.  She is adding fiber.  Continue to monitor.  MD spoke with Dr. Boyd with ID.  Does not recommend starting oral vancomycin at this time.    Factor V Leiden mutation  History of PE/DVT  History of stroke  Continue home lovenox    DM  A1c 9.1%  Continue Lantus and regular insulin while on tube feeds.  Glucose running 199-272.  Will increase every 6 hr regular insulin slightly today.    Hypertension  History A-fib  Prior restarted home metoprolol, norvasc  Continue lovenox    History of PTSD  Continue home cymbalta and elavil    Expected Discharge Location and Transportation: To be determined  Expected Discharge   Expected Discharge Date: 1/28/2025; Expected Discharge  Time:      VTE Prophylaxis:  Pharmacologic & mechanical VTE prophylaxis orders are present.         AM-PAC 6 Clicks Score (PT): 11 (01/27/25 1126)    CODE STATUS:   Code Status and Medical Interventions: CPR (Attempt to Resuscitate); Full Support   Ordered at: 01/02/25 1952     Code Status (Patient has no pulse and is not breathing):    CPR (Attempt to Resuscitate)     Medical Interventions (Patient has pulse or is breathing):    Full Support       Lynne Neff, APRN  01/27/25

## 2025-01-27 NOTE — PLAN OF CARE
Goal Outcome Evaluation:  Plan of Care Reviewed With: patient           Outcome Evaluation: Pt a/o, VSS, RA, sat. 95%, c/o pain addressed with PRN meds. Continue monitoring.

## 2025-01-27 NOTE — MBS/VFSS/FEES
Acute Care - Speech Language Pathology   Swallow Initial Evaluation [facility]  Clinical Swallow Evaluation  +Fiberoptic Endoscopic Evaluation of Swallowing (FEES)       Patient Name: Natalia Arzate  : 1985  MRN: [id number]  Today's Date: 2025               Admit Date: 2025    Visit Dx:     ICD-10-CM ICD-9-CM   1. Respiratory acidosis with metabolic acidosis  E87.4 276.3   2. Acute respiratory failure with hypoxia  J96.01 518.81   3. MARIAA (acute kidney injury)  N17.9 584.9   4. Pharyngeal dysphagia  R13.13 787.23     Patient Active Problem List   Diagnosis    Nephrolithiasis    Ovarian teratoma, right    Other chronic pain    Pelvic pain    History of DVT in adulthood    History of pulmonary embolism    Ureteral calculus    Ischemic cerebrovascular accident (CVA)    Primary hypertension    Left lumbar radiculopathy    PTSD (post-traumatic stress disorder)    MARIAA (acute kidney injury)    Anemia    Dyslipidemia    Hypothyroid    Other secondary gout, multiple sites    Factor 5 Leiden mutation, heterozygous    Vitamin D deficiency    Vitamin B 12 deficiency    Type 2 diabetes mellitus with hyperglycemia    Hoarseness, persistent    Ureterolithiasis    Acute cystitis without hematuria    Hepatic steatosis    Right flank pain, chronic    Detrusor instability of bladder    Frequency of micturition    Acute respiratory failure with hypercapnia    Hyperkalemia    Sepsis    Respiratory acidosis with metabolic acidosis    Acute respiratory failure    Diabetes mellitus type 2, insulin dependent    Diabetic peripheral neuropathy associated with type 2 diabetes mellitus     Past Medical History:   Diagnosis Date    A-fib     Abnormal ECG     Anemia     Anxiety     Asthma     Cancer     Ovarian    Depression     Diabetes mellitus     DVT (deep venous thrombosis)     Factor 5 Leiden mutation, heterozygous     Fibroid     GERD (gastroesophageal reflux disease)     Gout     H/O abdominal abscess     History of  sepsis     History of transfusion     Hyperlipidemia     Hypothyroid     Kidney stone     Migraines     Neuropathy     Ovarian cancer 2021    Ovarian cyst     PE (pulmonary embolism)     Polycystic ovary syndrome     Preeclampsia     Rh incompatibility     Stroke     TIA (transient ischemic attack)     Urinary tract infection     Varicella      Past Surgical History:   Procedure Laterality Date    ABDOMINAL SURGERY      CARDIAC CATHETERIZATION       SECTION      CHOLECYSTECTOMY      COLONOSCOPY      ENDOSCOPY      EXTRACORPOREAL SHOCK WAVE LITHOTRIPSY (ESWL) Left 10/22/2021    Procedure: EXTRACORPOREAL SHOCKWAVE LITHOTRIPSY;  Surgeon: Milan Motley MD;  Location: [facility];  Service: Urology;  Laterality: Left;    HYSTERECTOMY  2017    ovarian ca    INSERT CENTRAL LINE AT BEDSIDE  2025    LITHOTRIPSY      NEPHRECTOMY Left 10/2022    RIGHT OOPHORECTOMY      URETEROSCOPY LASER LITHOTRIPSY WITH STENT INSERTION Left 10/01/2021    Procedure: URETEROSCOPY WITH STENT PLACEMENT;  Surgeon: Milan Motley MD;  Location: [facility];  Service: Urology;  Laterality: Left;    URETEROSCOPY LASER LITHOTRIPSY WITH STENT INSERTION Left 2022    Procedure: URETEROSCOPY STENT REMOVAL;  Surgeon: Milan Motley MD;  Location: [facility];  Service: Urology;  Laterality: Left;    URETEROSCOPY LASER LITHOTRIPSY WITH STENT INSERTION Left 2022    Procedure: CYSTOSCOPY RETROGRADE LEFT WITH STENT PLACEMENT;  Surgeon: Milan Motley MD;  Location: [facility];  Service: Urology;  Laterality: Left;       SLP Recommendation and Plan  SLP Swallowing Diagnosis: mild, oral dysphagia, moderate, pharyngeal dysphagia (25 1300)  SLP Diet Recommendation: soft to chew textures, chopped, no mixed consistencies, honey thick liquids (25 1300)  Recommended Precautions and Strategies: general aspiration precautions, upright posture during/after eating, small bites of food and sips of  liquid, check mouth frequently for oral residue/pocketing, assist with feeding (01/27/25 1300)  SLP Rec. for Method of Medication Administration: meds via alternate route (01/27/25 1300)     Monitor for Signs of Aspiration: yes, notify SLP if any concerns (01/27/25 1300)     Swallow Criteria for Skilled Therapeutic Interventions Met: demonstrates skilled criteria (01/27/25 1300)  Anticipated Discharge Disposition (SLP): inpatient rehabilitation facility (01/27/25 1300)  Rehab Potential/Prognosis, Swallowing: good, to achieve stated therapy goals (01/27/25 1300)  Therapy Frequency (Swallow): 5 days per week (01/27/25 1300)  Predicted Duration Therapy Intervention (Days): 1 week (01/27/25 1300)  Oral Care Recommendations: Oral Care BID/PRN, Toothbrush (01/27/25 1300)                                        Progress: no change      SWALLOW EVALUATION (Last 72 Hours)       SLP Adult Swallow Evaluation       Row Name 01/27/25 1300                   Rehab Evaluation    Document Type re-evaluation  -CJ        Subjective Information no complaints  -        Patient Observations alert;cooperative  -        Patient/Family/Caregiver Comments/Observations fam present  -        Patient Effort good  -CJ        Symptoms Noted During/After Treatment none  -           General Information    Patient Profile Reviewed yes  -CJ        Pertinent History Of Current Problem see initial; CSE completed @ 1300 followed by FEES @ 1410  -        Current Method of Nutrition NPO;nasogastric feedings;small-bore  -CJ        Precautions/Limitations, Vision WFL;for purposes of eval  -CJ        Precautions/Limitations, Hearing WFL;for purposes of eval  -CJ        Prior Level of Function-Communication unknown  -CJ        Prior Level of Function-Swallowing other (see comments)  -        Plans/Goals Discussed with patient;agreed upon  -        Barriers to Rehab none identified  -        Patient's Goals for Discharge return to PO diet  -CJ            Pain    Additional Documentation Pain Scale: FACES Pre/Post-Treatment (Group)  -CJ           Pain Scale: FACES Pre/Post-Treatment    Pain: FACES Scale, Pretreatment 2-->hurts little bit  -CJ        Posttreatment Pain Rating 2-->hurts little bit  -CJ           Oral Motor Structure and Function    Secretion Management WNL/WFL  -CJ        Mucosal Quality moist, healthy  -CJ           General Eating/Swallowing Observations    Respiratory Support Currently in Use room air  -CJ        Eating/Swallowing Skills self-fed;fed by SLP  -CJ        Positioning During Eating upright 90 degree;upright in bed  -CJ        Utensils Used spoon;cup;straw  -CJ        Consistencies Trialed ice chips;thin liquids;nectar/syrup-thick liquids;pureed  -CJ           Respiratory    Date of Intubation 1/1-1/17  -CJ           Clinical Swallow Eval    Oral Prep Phase impaired  -CJ        Oral Transit impaired  -CJ        Oral Residue WFL  -CJ        Pharyngeal Phase suspected pharyngeal impairment  -CJ        Esophageal Phase unremarkable  -CJ        Clinical Swallow Evaluation Summary Improved overall status, improved vocal quality. Cough intermittently w/ thins. Will complete FEES  -CJ           Fiberoptic Endoscopic Evaluation of Swallowing (FEES)    Risks/Benefits Reviewed risks/benefits explained;patient;agreed to eval  -CJ        Nasal Entry right:  -           Anatomy and Physiology    Anatomic Considerations edema;erythema  -CJ        Velopharyngeal WFL  -CJ        Base of Tongue symmetrical;range adequate  -CJ        Epiglottis WFL  -CJ        Laryngeal Function Breathing symmetrical  -CJ        Laryngeal Function Phonation symmetrical  -CJ        Laryngeal Function to Breath Hold TVF/FVF/Arytenoid;sustained closure  -CJ        Secretion Rating Scale (Apollo et al. 1996) 0- normal, no visible secretions  -CJ        Secretion Description thin;clear  -CJ        Ice Chips elicited swallow  -CJ        Spontaneous Swallow frequency  adequate  -CJ        Sensory sensed scope  -CJ        Utensils Used Spoon;Cup;Straw  -CJ        Consistencies Trialed ice chips;thin liquids;nectar-thick liquids;honey-thick liquids;pudding/puree;regular textures  -CJ           FEES Interpretation    Oral Phase prespill of liquids into pharynx  -CJ           Initiation of Pharyngeal Swallow    Initiation of Pharyngeal Swallow bolus in pyriform sinuses  -CJ        Pharyngeal Phase impaired pharyngeal phase of swallowing  -CJ        Aspiration During the Swallow thin liquids;nectar-thick liquids;secondary to delayed swallow initiation or mistiming;secondary to reduced laryngeal elevation;secondary to reduced vestibular closure  -CJ        Aspiration After the Swallow nectar-thick liquids;thin liquids;secondary to residue;in laryngeal vestibule;in pyriform sinuses  -CJ        Depth of Penetration deep  -CJ        Response to Penetration No  -CJ        No spontaneous response to penetration and non-effective laryngeal clearance with cue (see comments)  -CJ        Response to Aspiration No  -CJ        No spontaneous response to aspiration with non-effective subglottic clearance with cue (see comments)  -CJ        Rosenbek's Scale nectar:;thin:;8-->Level 8  -CJ        Residue thin liquids;nectar-thick liquids;diffuse within pharynx;secondary to reduced posterior pharyngeal wall stripping;secondary to reduced laryngeal elevation;secondary to reduced hyolaryngeal excursion  -CJ        Response to Residue partial residue clearance;with cued swallow  -CJ        Attempted Compensatory Maneuvers bolus size;bolus presentation style;additional subsequent swallow;throat clear after swallow  -CJ        Response to Attempted Compensatory Maneuvers did not prevent aspiration  -CJ        Successful Compensatory Maneuver Competency patient able to;demonstrate compensations  -CJ        Pharyngeal Phase, Comment Improved oropharyngeal dysphagia. Silent aspiraiton w/ thins/nectar thick  liquids only. Pushed for fatigue and no penetration/aspiration w/ soft solids or honey thick liquids. Will initaite po diet  -           SLP Evaluation Clinical Impression    SLP Swallowing Diagnosis mild;oral dysphagia;moderate;pharyngeal dysphagia  -        Functional Impact risk of aspiration/pneumonia  -        Rehab Potential/Prognosis, Swallowing good, to achieve stated therapy goals  -        Swallow Criteria for Skilled Therapeutic Interventions Met demonstrates skilled criteria  -           Recommendations    Therapy Frequency (Swallow) 5 days per week  -        Predicted Duration Therapy Intervention (Days) 1 week  -        SLP Diet Recommendation soft to chew textures;chopped;no mixed consistencies;honey thick liquids  -        Recommended Precautions and Strategies general aspiration precautions;upright posture during/after eating;small bites of food and sips of liquid;check mouth frequently for oral residue/pocketing;assist with feeding  -        Oral Care Recommendations Oral Care BID/PRN;Toothbrush  -        SLP Rec. for Method of Medication Administration meds via alternate route  -        Monitor for Signs of Aspiration yes;notify SLP if any concerns  -        Anticipated Discharge Disposition (SLP) inpatient rehabilitation facility  -                  User Key  (r) = Recorded By, (t) = Taken By, (c) = Cosigned By      Initials Name Effective Dates    Chelo Chairez, MS CCC-SLP 01/21/25 -                     EDUCATION  The patient has been educated in the following areas:   Dysphagia (Swallowing Impairment) Oral Care/Hydration Modified Diet Instruction.        SLP GOALS       Row Name 01/27/25 1300             (LTG) Patient will demonstrate progress toward functional swallow for    Diet Texture (Demonstrate progress toward functional swallow) regular textures  -      Liquid viscosity (Demonstrate progress toward functional swallow) thin liquids  -      [place]  (Demonstrate progress towards functional swallow) with minimal cues (75-90% accuracy)  -CJ      Time Frame (Demonstrate progress toward functional swallow) 1 week  -CJ      Progress/Outcomes (Demonstrate progress toward functional swallow) goal ongoing  -CJ         (STG) Patient will tolerate trials of    Consistencies Trialed (Tolerate trials) soft to chew (chopped) textures;honey/ moderately thick liquids  -CJ      Desired Outcome (Tolerate trials) without signs/symptoms of aspiration;without signs of distress  -CJ      [place] (Tolerate trials) independently (over 90% accuracy)  -CJ      Time Frame (Tolerate trials) 1 week  -CJ      Progress/Outcomes (Tolerate trials) goal revised this date  -CJ         (STG) Lingual Strengthening Goal 1 (SLP)    Progress/Outcomes (Lingual Strengthening Goal 1, SLP) goal no longer appropriate  -CJ         (STG) Pharyngeal Strengthening Exercise Goal 1 (SLP)    Activity (Pharyngeal Strengthening Goal 1, SLP) increase timing;increase superior movement of the hyolaryngeal complex;increase anterior movement of the hyolaryngeal complex;increase closure at entrance to airway/closure of airway at glottis;increase squeeze/positive pressure generation  -CJ      Increase Timing prepping - 3 second prep or suck swallow or 3-step swallow  -CJ      Increase Superior Movement of the Hyolaryngeal Complex effortful pitch glide (falsetto + pharyngeal squeeze)  -CJ      Increase Anterior Movement of the Hyolaryngeal Complex chin tuck against resistance (CTAR)  -CJ      Increase Closure at Entrance to Airway/Closure of Airway at Glottis supraglottic swallow  -CJ      Increase Squeeze/Positive Pressure Generation hard effortful swallow  -CJ      [place]/Accuracy (Pharyngeal Strengthening Goal 1, SLP) with minimal cues (75-90% accuracy)  -CJ      Time Frame (Pharyngeal Strengthening Goal 1, SLP) 1 week  -CJ      Progress/Outcomes (Pharyngeal Strengthening Goal 1, SLP) goal ongoing  -CJ                 User Key  (r) = Recorded By, (t) = Taken By, (c) = Cosigned By      Initials Name Provider Type     Chelo Pabon, MS CCC-SLP Speech and Language Pathologist                         Time Calculation:         Therapy Charges for Today       Code Description Service Date Service Provider Modifiers Qty    75693088714 HC ST EVAL ORAL PHARYNG SWALLOW 3 1/27/2025 Chelo Pabon, MS CCC-SLP GN 1    [id number]  ST FIBEROPTIC ENDO EVAL SWALL 5 1/27/2025 Chelo Pabon, MS CCC-SLP GN 1                 Chelo Pabon MS CCC-SLP  1/28/2025

## 2025-01-27 NOTE — CASE MANAGEMENT/SOCIAL WORK
Continued Stay Note   Kady     Patient Name: Natalia Arzate  MRN: [id number]  Today's Date: 1/27/2025    Admit Date: 1/2/2025    Plan: Rehab   Discharge Plan       Row Name 01/27/25 1721       Plan    Plan Rehab    Plan Comments Pt passed speech eval today, and NG tube was removed. She was progressed to a soft, chopped diet with honey thick liquids. She has a rectal tube for diarrhea. She had a CT RUE due to pain. She walked 1ft with 2 person assist and a walker today. She is on room air. I spoke with her, in room. I explained  [facility] declined her as a patient. I have sent out mass referrals to facilities within a 30 mile radius of her home (faxed to Diana @ [facility]) [phone] - rescinded at Pt's request, Alondra @ [facility] f) [phone], Mary Anne @ [facility] f) [phone], admissions @ [facility] f) [phone], Pallavi @ [facility] f) [phone], Fidelina @ [facility] f) [phone], and called Laurie with [facility]. Per Rosa Isela with [facility], they do not currently have any open beds. I left messages for Dannie with [facility] and Luz Elena with [facility] to return my call). Pt asked if she could just go home with HH. I explained she was only able to walk 1ft with therapy (has been up with a lift previously). I also explained HH only comes twice weekly whereas rehab provides 1.5 hours therapy/day. I voiced safety concerns for her returning home, even with help from her son and essence, at that level of function. I asked her to consider how she would ambulate to the kitchen, bathroom, living room, and more importantly, could she vacate the home in case of a fire or home emergency. CM will continue to follow for medical readiness.    Final Discharge Disposition Code 03 - skilled nursing facility (SNF)                    Discharge Codes    No documentation.                 Expected Discharge Date and Time       Expected Discharge Date Expected Discharge Time    Jan 28, 2025               Pallavi Donohue RN

## 2025-01-27 NOTE — PLAN OF CARE
Goal Outcome Evaluation:  Plan of Care Reviewed With: patient        Progress: improving  Outcome Evaluation: Pt able to partcipate with all planned therapy today. Pt is making good progress to walking. Pt remains limited by poor activity tolerance and weakness. Continue PT POC as tolerated to bring her back to baseline. Continue to recommend IRF upon d/c.    Anticipated Discharge Disposition (PT): inpatient rehabilitation facility

## 2025-01-27 NOTE — THERAPY TREATMENT NOTE
Patient Name: Natalia Arzate  : 1985    MRN: [id number]                              Today's Date: 2025       Admit Date: 2025    Visit Dx:     ICD-10-CM ICD-9-CM   1. Respiratory acidosis with metabolic acidosis  E87.4 276.3   2. Acute respiratory failure with hypoxia  J96.01 518.81   3. MARIAA (acute kidney injury)  N17.9 584.9   4. Pharyngeal dysphagia  R13.13 787.23     Patient Active Problem List   Diagnosis    Nephrolithiasis    Ovarian teratoma, right    Other chronic pain    Pelvic pain    History of DVT in adulthood    History of pulmonary embolism    Ureteral calculus    Ischemic cerebrovascular accident (CVA)    Primary hypertension    Left lumbar radiculopathy    PTSD (post-traumatic stress disorder)    MARIAA (acute kidney injury)    Anemia    Dyslipidemia    Hypothyroid    Other secondary gout, multiple sites    Factor 5 Leiden mutation, heterozygous    Vitamin D deficiency    Vitamin B 12 deficiency    Type 2 diabetes mellitus with hyperglycemia    Hoarseness, persistent    Ureterolithiasis    Acute cystitis without hematuria    Hepatic steatosis    Right flank pain, chronic    Detrusor instability of bladder    Frequency of micturition    Acute respiratory failure with hypercapnia    Hyperkalemia    Sepsis    Respiratory acidosis with metabolic acidosis    Acute respiratory failure    Diabetes mellitus type 2, insulin dependent    Diabetic peripheral neuropathy associated with type 2 diabetes mellitus     Past Medical History:   Diagnosis Date    A-fib     Abnormal ECG     Anemia     Anxiety     Asthma     Cancer     Ovarian    Depression     Diabetes mellitus     DVT (deep venous thrombosis)     Factor 5 Leiden mutation, heterozygous     Fibroid     GERD (gastroesophageal reflux disease)     Gout     H/O abdominal abscess     History of sepsis     History of transfusion     Hyperlipidemia     Hypothyroid     Kidney stone     Migraines     Neuropathy     Ovarian cancer 2021     Ovarian cyst     PE (pulmonary embolism)     Polycystic ovary syndrome     Preeclampsia     Rh incompatibility     Stroke     TIA (transient ischemic attack)     Urinary tract infection     Varicella      Past Surgical History:   Procedure Laterality Date    ABDOMINAL SURGERY      CARDIAC CATHETERIZATION       SECTION      CHOLECYSTECTOMY      COLONOSCOPY      ENDOSCOPY      EXTRACORPOREAL SHOCK WAVE LITHOTRIPSY (ESWL) Left 10/22/2021    Procedure: EXTRACORPOREAL SHOCKWAVE LITHOTRIPSY;  Surgeon: Milan Motley MD;  Location: [facility];  Service: Urology;  Laterality: Left;    HYSTERECTOMY  2017    ovarian ca    INSERT CENTRAL LINE AT BEDSIDE  2025    LITHOTRIPSY      NEPHRECTOMY Left 10/2022    RIGHT OOPHORECTOMY      URETEROSCOPY LASER LITHOTRIPSY WITH STENT INSERTION Left 10/01/2021    Procedure: URETEROSCOPY WITH STENT PLACEMENT;  Surgeon: Milan Motley MD;  Location: [facility] OR;  Service: Urology;  Laterality: Left;    URETEROSCOPY LASER LITHOTRIPSY WITH STENT INSERTION Left 2022    Procedure: URETEROSCOPY STENT REMOVAL;  Surgeon: Milan Motley MD;  Location: [facility] OR;  Service: Urology;  Laterality: Left;    URETEROSCOPY LASER LITHOTRIPSY WITH STENT INSERTION Left 2022    Procedure: CYSTOSCOPY RETROGRADE LEFT WITH STENT PLACEMENT;  Surgeon: Milan Motley MD;  Location: [facility] OR;  Service: Urology;  Laterality: Left;      General Information       Row Name 25 1110          Physical Therapy Time and Intention    Document Type therapy note (daily note)  -ER     Mode of Treatment physical therapy  -ER       Row Name 25 1110          General Information    Patient Profile Reviewed yes  -ER     Existing Precautions/Restrictions fall;oxygen therapy device and L/min;other (see comments)  Contact; NG; back wound; hx of CVA with L sided deficits  -ER       Row Name 25 1110          Cognition    Orientation Status (Cognition) oriented x 3   "-ER       Row Name 01/27/25 1110          Safety Issues/Impairments Affecting Functional Mobility    Impairments Affecting Function (Mobility) balance;coordination;endurance/activity tolerance;grasp;motor control;motor planning;pain;postural/trunk control;range of motion (ROM);sensation/sensory awareness;shortness of breath;strength  -ER               User Key  (r) = Recorded By, (t) = Taken By, (c) = Cosigned By      Initials Name Provider Type    ER Yolande Nassar, PT Physical Therapist                   Mobility       Row Name 01/27/25 1111          Bed Mobility    Bed Mobility rolling left;rolling right;supine-sit;sit-supine  -ER     Rolling Left [place] (Bed Mobility) minimum assist (75% patient effort)  -ER     Rolling Right [place] (Bed Mobility) minimum assist (75% patient effort)  -ER     Scooting/Bridging [place] (Bed Mobility) moderate assist (50% patient effort);2 person assist  -ER     Supine-Sit [place] (Bed Mobility) moderate assist (50% patient effort);verbal cues;1 person assist  -ER     Sit-Supine [place] (Bed Mobility) moderate assist (50% patient effort);2 person assist  -ER     Assistive Device (Bed Mobility) bed rails;repositioning sheet;head of bed elevated  -ER     Comment, (Bed Mobility) extensive cues and aid needed today.  -ER       Row Name 01/27/25 1111          Bed-Chair Transfer    Comment, (Bed-Chair Transfer) deferred  -ER       Row Name 01/27/25 1111          Sit-Stand Transfer    Sit-Stand [place] (Transfers) minimum assist (75% patient effort);2 person assist  -ER     Assistive Device (Sit-Stand Transfers) walker, front-wheeled  -ER     Comment, (Sit-Stand Transfer) pt stood 2x with minax2. no LOB noted. pt needed instruction to not \"plop\" when sitting  -ER       Row Name 01/27/25 1111          Gait/Stairs (Locomotion)    [place] Level (Gait) minimum assist (75% patient effort);2 person assist  -ER     Assistive Device (Gait) walker, " front-wheeled  -ER     Distance in Feet (Gait) 1  -ER     Comment, (Gait/Stairs) pt able to take a very small 1 step forward and backward but had intense fear of falling and deferred further ambulation. pt practiced pre gait activities of lateral weight shift and anterior/posterior weight shift. pt reports she felt dizzy with weight shifting  -ER               User Key  (r) = Recorded By, (t) = Taken By, (c) = Cosigned By      Initials Name Provider Type    Yolande Sandra PT Physical Therapist                   Obj/Interventions       Row Name 01/27/25 1117          Balance    Balance Interventions sitting;standing;sit to stand;supported;static;dynamic;minimal challenge  -ER     Comment, Balance pt able to do a few rounds of standing balance training  -ER               User Key  (r) = Recorded By, (t) = Taken By, (c) = Cosigned By      Initials Name Provider Type    ER Yolande Nassar PT Physical Therapist                   Goals/Plan    No documentation.                  Clinical Impression       Row Name 01/27/25 1121          Pain    Pretreatment Pain Rating 6/10  -ER     Posttreatment Pain Rating 6/10  -ER     Pain Location back  -ER     Pain Side/Orientation generalized  -ER     Pain Management Interventions nursing notified  -ER     Response to Pain Interventions activity participation with tolerable pain  -ER       Row Name 01/27/25 1121          Plan of Care Review    Plan of Care Reviewed With patient  -ER     Progress improving  -ER     Outcome Evaluation Pt able to partcipate with all planned therapy today. Pt is making good progress to walking. Pt remains limited by poor activity tolerance and weakness. Continue PT POC as tolerated to bring her back to baseline. Continue to recommend IRF upon d/c.  -ER       Row Name 01/27/25 1121          Vital Signs    Post Systolic BP Rehab 132  -ER     Post Treatment Diastolic   -ER     Posttreatment Heart Rate (beats/min) 104  -ER     O2 Delivery Pre  Treatment room air  -ER     O2 Delivery Intra Treatment room air  -ER     Post SpO2 (%) 90  -ER     O2 Delivery Post Treatment room air  -ER     Pre Patient Position Supine  -ER     Intra Patient Position Standing  -ER     Post Patient Position Sitting  -ER       Row Name 01/27/25 1121          Positioning and Restraints    Pre-Treatment Position in bed  -ER     Post Treatment Position bed  -ER     In Bed notified nsg;supine;call light within reach;with family/caregiver;exit alarm on;side rails up x3  -ER               User Key  (r) = Recorded By, (t) = Taken By, (c) = Cosigned By      Initials Name Provider Type    ER Yolande Nassar, PT Physical Therapist                   Outcome Measures       Row Name 01/27/25 1126          How much help from another person do you currently need...    Turning from your back to your side while in flat bed without using bedrails? 3  -ER     Moving from lying on back to sitting on the side of a flat bed without bedrails? 2  -ER     Moving to and from a bed to a chair (including a wheelchair)? 1  -ER     Standing up from a chair using your arms (e.g., wheelchair, bedside chair)? 3  -ER     Climbing 3-5 steps with a railing? 1  -ER     To walk in hospital room? 1  -ER     AM-PAC 6 Clicks Score (PT) 11  -ER     Highest Level of Mobility Goal 4 --> Transfer to chair/commode  -ER       Row Name 01/27/25 1126          Functional Assessment    Outcome Measure Options AM-PAC 6 Clicks Basic Mobility (PT)  -ER               User Key  (r) = Recorded By, (t) = Taken By, (c) = Cosigned By      Initials Name Provider Type    ER Yolande Nassar PT Physical Therapist                                 Physical Therapy Education       Title: PT OT SLP Therapies (In Progress)       Topic: Physical Therapy (In Progress)       Point: Mobility training (In Progress)       Learning Progress Summary            Patient Acceptance, E, VU by ER at 1/27/2025 1127    Comment: progress, POC, need for PT     Acceptance, E, VU,NR by ML at 1/24/2025 1151    Acceptance, E, VU,NR by NS at 1/22/2025 1613    Comment: pt encouraged to continue with HEP between therapy sessions    Acceptance, E, NR by ML at 1/19/2025 1124   Family Acceptance, E, NR by ML at 1/19/2025 1124                      Point: Home exercise program (Done)       Learning Progress Summary            Patient Acceptance, E, VU by ER at 1/27/2025 1127    Comment: progress, POC, need for PT    Acceptance, E, VU,NR by ML at 1/24/2025 1151    Acceptance, E, VU,NR by NS at 1/22/2025 1613    Comment: pt encouraged to continue with HEP between therapy sessions                      Point: Body mechanics (Done)       Learning Progress Summary            Patient Acceptance, E, VU by ER at 1/27/2025 1127    Comment: progress, POC, need for PT    Acceptance, E, VU,NR by ML at 1/24/2025 1151    Acceptance, E, VU,NR by NS at 1/22/2025 1613    Comment: pt encouraged to continue with HEP between therapy sessions                      Point: Precautions (In Progress)       Learning Progress Summary            Patient Acceptance, E, VU by ER at 1/27/2025 1127    Comment: progress, POC, need for PT    Acceptance, E, VU,NR by NS at 1/22/2025 1613    Comment: pt encouraged to continue with HEP between therapy sessions    Acceptance, E, NR by ML at 1/19/2025 1124   Family Acceptance, E, NR by ML at 1/19/2025 1124                                      User Key       Initials Effective Dates Name Provider Type Discipline    NS 06/16/21 -  Germaine Thao, PT Physical Therapist PT     04/22/21 -  Debbi Hall Physical Therapist PT    ER 12/13/24 -  Yolande Nassar, PT Physical Therapist PT                  PT Recommendation and Plan     Progress: improving  Outcome Evaluation: Pt able to partcipate with all planned therapy today. Pt is making good progress to walking. Pt remains limited by poor activity tolerance and weakness. Continue PT POC as tolerated to bring her back to baseline.  Continue to recommend IRF upon d/c.     Time Calculation:         PT Charges       Row Name 01/27/25 1128             Time Calculation    Start Time 1034  -ER      PT Received On 01/27/25  -ER      PT Goal Re-Cert Due Date 01/29/25  -ER         Timed Charges    28721 - Gait Training Minutes  12  -ER      30703 - PT Therapeutic Activity Minutes 20  -ER         Total Minutes    Timed Charges Total Minutes 32  -ER       Total Minutes 32  -ER                User Key  (r) = Recorded By, (t) = Taken By, (c) = Cosigned By      Initials Name Provider Type    ER Yolande Nassar, PT Physical Therapist                  Therapy Charges for Today       Code Description Service Date Service Provider Modifiers Qty    44589109962 HC GAIT TRAINING EA 15 MIN 1/27/2025 Yolande Nassar, PT GP 1    39206877021 HC PT THERAPEUTIC ACT EA 15 MIN 1/27/2025 Yolande Nassar, PT GP 1            PT G-Codes  Outcome Measure Options: AM-PAC 6 Clicks Basic Mobility (PT)  AM-PAC 6 Clicks Score (PT): 11  AM-PAC 6 Clicks Score (OT): 8  PT Discharge Summary  Anticipated Discharge Disposition (PT): inpatient rehabilitation facility    Yolande Nassar PT  1/27/2025

## 2025-01-27 NOTE — DISCHARGE PLACEMENT REQUEST
"Mago Arzate \"Isa\" (39 y.o. Female)    To Admissions  From Pallavi Donohue RN Case M[facility]    [phone]       Date of Birth   1985    Social Security Number       Address   [address] [facility] [address]    Home Phone   [phone]    MRN   [id number]       Lutheran   Yarsani    Marital Status                               Admission Date   1/2/25    Admission Type   Urgent    Admitting Provider   Soo Casas MD    Attending Provider   Soo Casas MD    Department, Room/Bed   [facility] 6A, N620/1       Discharge Date       Discharge Disposition       Discharge Destination                                 Attending Provider: Soo Casas MD    Allergies: Salvia Officinalis, Shellfish Allergy, Toradol [Ketorolac Tromethamine], Tree Nut, Haldol [Haloperidol], Tramadol, Amoxicillin, Penicillins    Isolation: Contact   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), 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)   Code Status: CPR    Ht: 162.6 cm (64\")   Wt: 159 kg (351 lb 3.2 oz)    Admission Cmt: None   Principal Problem: Acute respiratory failure with hypercapnia [J96.02]                   Active Insurance as of 1/2/2025       Primary Coverage       Payor Plan Insurance Group Employer/Plan Group    [place]        Payor Plan Address Payor Plan Phone Number Payor Plan Fax Number Effective Dates    [address]   4/1/2016 - None Entered    [facility] [id number]         Subscriber Name Subscriber Birth Date Member ID       MAGO ARZATE 1985 [id number]                     Emergency Contacts        (Rel.) Home Phone Work Phone Mobile Phone    Faina Arzate (Mother) [phone] -- --    HueyMilan (Son) [phone] -- [phone]              Insurance Information                  [employer] Phone: --    Subscriber: Mago Arzate Subscriber#: " [id number]    Group#: -- Precert#: --    Authorization#: [id number] Effective Date: --             History & Physical        GarciaRoly MD at 01/03/25 0947          Pulmonary/Critical Care ICU note     LOS: 1 day   Patient Care Team:  Bladimir Calle PA-C as PCP - General (Physician Assistant)    Chief Complaint: AMS    Subjective    History reviewed and updated in EMR as indicated.    Interval history:    Patient remains on mechanical ventilator.  She is requiring high levels of insulin infusion for blood sugars.  She is sedated.  She has copious secretions.    History taken from: record    Past Medical History/Family History/Social History were reviewed by me and updates were made.     Review of Systems:    Review of 14 systems was completed with positives and pertinent negatives noted in the subjective section.  All other systems reviewed and are negative.         Objective    Vital Signs  Temp:  [98.4 °F (36.9 °C)-103.1 °F (39.5 °C)] 100.2 °F (37.9 °C)  Heart Rate:  [] 100  Resp:  [20-27] 23  BP: ()/(39-85) 128/63  Arterial Line BP: ()/(41-85) 111/50  FiO2 (%):  [60 %-100 %] 60 %  There is no height or weight on file to calculate BMI.  Mode: VC+/AC  FiO2 (%):  [60 %-100 %] 60 %  S RR:  [16-22] 22  S VT:  [385 mL-450 mL] 385 mL  PEEP/CPAP (cm H2O):  [5 cm H20-14 cm H20] 14 cm H20  MAP (cm H2O):  [18-21] 20    Intake/Output Summary (Last 24 hours) at 1/3/2025 0949  Last data filed at 1/3/2025 0848  Gross per 24 hour   Intake 3881.92 ml   Output 3560 ml   Net 321.92 ml      Physical Exam:     General Appearance:    Alert, cooperative, in no acute distress   Head:    Normocephalic, without obvious abnormality, atraumatic   Eyes:            Lids and lashes normal   ENMT:   Ears appear intact with no abnormalities noted    Oral endotracheal tube in place.   Neck:   No adenopathy, supple, trachea midline, no thyromegaly,      no carotid bruit, no JVD   Lungs/resp:   On ventilator,  symmetric chest rise, no crepitus, bilateral rhonchi, no chest wall tenderness                  Heart/CV:    Regular rhythm and normal rate, normal S1 and S2, no         murmur   Abdomen/GI:   Obese, nontender, nondistended   G/U:     Deferred   Extremities/MSK:   No clubbing, cyanosis or edema.  Normal tone.  No deformities.    Pulses:   Pulses palpable and equal bilaterally   Skin:   No bleeding, bruising or rash   Hem/Lymph:   No cervical or supraclavicular adenopathy.    Neurologic:   Moves all extremities with no obvious focal motor deficit.  Cranial nerves 2 - 12 grossly intact            Psychiatric: Sedated.     The above findings are documentation of my personal physical exam findings from today.   Electronically signed by:  Roly Garcia MD  01/03/25  09:49 EST     Results Review:     I reviewed the patient's new clinical results.   Results from last 7 days   Lab Units 01/03/25 0433 01/03/25  0018 01/02/25 2021 01/02/25  1314 01/02/25  0900 01/02/25  0453   SODIUM mmol/L 142  142 143 134*   < > 137 136   POTASSIUM mmol/L 4.2  4.2 4.4 4.7   < > 6.1* 7.0*   CHLORIDE mmol/L 105  105 105 98   < > 100 101   CO2 mmol/L 24.0  24.0 27.0 22.0   < > 22.6 22.3   BUN mg/dL 28*  28* 31* 34*   < > 40* 41*   CREATININE mg/dL 1.29*  1.29* 1.25* 1.38*   < > 2.39* 2.50*   CALCIUM mg/dL 8.8  8.8 9.0 8.7   < > 8.1* 8.4*   BILIRUBIN mg/dL 0.3  --   --   --  0.3 0.4   ALK PHOS U/L 145*  --   --   --  137* 142*   ALT (SGPT) U/L 54*  --   --   --  71* 71*   AST (SGOT) U/L 63*  --   --   --  119* 136*   GLUCOSE mg/dL 147*  147* 168* 234*   < > 315* 314*    < > = values in this interval not displayed.     Results from last 7 days   Lab Units 01/03/25 0433 01/02/25 1314 01/02/25  0900   WBC 10*3/mm3 8.23 9.71 10.48   HEMOGLOBIN g/dL 7.5* 8.5* 8.4*   HEMATOCRIT % 25.2* 28.0* 28.1*   PLATELETS 10*3/mm3 196 222 232     Results from last 7 days   Lab Units 01/03/25  0425   PH, ARTERIAL pH units 7.291*   PO2 ART mm Hg  69.1*   PCO2, ARTERIAL mm Hg 55.6*   HCO3 ART mmol/L 26.8*       I reviewed the patient's new imaging including images and reports.    Chest x-ray 1/3/2025 shows cardiomegaly with left greater than right bilateral pulmonary infiltrates/possible left effusion with endotracheal tube in place and right internal jugular catheter.    CT chest 1/2/2025 shows bibasilar airspace opacities and cardiomegaly.  There is a small left pleural effusion.    CT abdomen and pelvis shows enlarged liver.    Radiologist impression follows:    IMPRESSION:     CT CHEST:  BIBASILAR PROBABLE PNEUMONIA AND ATELECTASIS WITH SMALL LEFT PLEURAL  EFFUSION.     CT ABDOMEN AND PELVIS:  SEVERELY ENLARGED LIVER WITH FATTY INFILTRATION.  STATUS POST LEFT NEPHRECTOMY.    CT head 1/2/2025:    IMPRESSION:     NO ACUTE INTRACRANIAL ABNORMALITY.       Medication Review:   cefepime, 2,000 mg, Intravenous, Q12H  chlorhexidine, 15 mL, Mouth/Throat, Q12H  famotidine, 20 mg, Intravenous, BID  metroNIDAZOLE, 500 mg, Intravenous, Q8H  mupirocin, 1 Application, Each Nare, BID  senna-docusate sodium, 2 tablet, Nasogastric, BID  sodium chloride, 10 mL, Intravenous, Q12H  sodium zirconium cyclosilicate, 10 g, Nasogastric, TID  vancomycin, 1,250 mg, Intravenous, Q12H      amiodarone, 0.5 mg/min, Last Rate: 0.5 mg/min (01/03/25 0021)  fentanyl 10 mcg/mL,  mcg/hr, Last Rate: 100 mcg/hr (01/02/25 1808)  heparin, 17 Units/kg/hr, Last Rate: 17 Units/kg/hr (01/03/25 0620)  insulin, 0-100 Units/hr, Last Rate: 18 Units/hr (01/03/25 0944)  Midazolam 1 mg/mL 100mL NS, 1-10 mg/hr, Last Rate: 10 mg/hr (01/03/25 0155)  norepinephrine, 0.02-0.3 mcg/kg/min, Last Rate: 0.02 mcg/kg/min (01/03/25 0715)  Pharmacy to Dose Heparin,   Pharmacy to dose vancomycin,   sodium chloride, 100 mL/hr, Last Rate: 100 mL/hr (01/03/25 0830)        Assessment & Plan    Active Hospital Problems    Diagnosis     **Acute respiratory failure with hypercapnia     Hyperkalemia     Sepsis      Respiratory acidosis with metabolic acidosis     Acute respiratory failure     Type 2 diabetes mellitus with hyperglycemia     MARIAA (acute kidney injury)      39 y.o. female with history of HLD, hypothyroidism, atrial fibrillation, history of PE/DVT, factor V Leiden mutation, stroke, presented to [facility] with AMS and difficult to arouse.  Evaluation there showed significant renal failure, hyperglycemia, hypercapnic respiratory failure, and basilar pulmonary infiltrates concerning for pneumonia.  She was intubated for respiratory failure and placed on pressors for hypotension.  She was treated with shifting agents for hyperkalemia.  She was given Narcan without significant response.  She was subsequently transferred to [facility] ICU for ongoing management.    Patient continues to have significant secretions.  She is not yet appropriate for formal ventilator weaning.  Continue antibiotics.    Plan:  1.  For acute mixed hypoxemic and hypercapnic respiratory failure: Continue mechanical ventilation.  Adjust settings to optimize ventilation and oxygenation and minimize barotrauma.  2.  For pulmonary infiltrates/basilar pneumonia unknown organism: Worrisome for aspiration.  Follow-up cultures.  Broad-spectrum antibiotics with cefepime plus Flagyl and vancomycin.  3.  For MARIAA: Improving.  Nephrology following.  Avoid nephrotoxins when possible and renally dose medications when appropriate.  4.  For hyperkalemia: Status post shifting agents.  Improved.  Monitor.  5.  For poorly controlled T2DM: Glucose monitoring and insulin drip protocol.  6.  For sepsis: Broad-spectrum antibiotics as above.  Pressors if needed.  Repeat lactic acid level.  7.  For history of atrial fibrillation/DVT/PE/factor V Leiden: Heparin drip for now but if hemoglobin stable transition to home Eliquis.  Hemoglobin dropped a bit.  8.  For chronic anemia: Monitor.  Transfuse if necessary.    Patient is critically ill secondary multisystem organ failure and  has high risk of life-threatening decline in condition.  As such, she requires continuous monitoring frequent reassessment for consideration of adjustment management in order to minimize that risk.  I personally reassessed her multiple times today.    Critical care time : 38 minutes spent by me personally.(This excludes time spent performing separately reportable procedures and services). Critical care time includes high complexity decision making to assess, manipulate, and support vital organ system failure in this individual who has impairment of one or more vital organ systems such that there is a high probability of imminent or life threatening deterioration in the patient’s condition.    Electronically signed by:  Roly Garcia MD  01/03/25  09:49 EST        Please note that portions of this note were completed with [employer] - a voice recognition program.     Electronically signed by Roly Garcia MD at 01/03/25 1840       Roly Garcia MD at 01/02/25 0832          Pulmonary/Critical Care History and Physical Exam     LOS: 0 days   Patient Care Team:  Bladimir Calle PA-C as PCP - General (Physician Assistant)    Chief Complaint: AMS    Subjective    HPI:     Ms. Arzate is a 39 year old female with a pmhx of Afib, diabetes, h/o PE/DVT, Factor 5 Leiden mutation, GERD, HLD, h/o stroke and hypothyroidism who presented to [facility] with altered mental status and being difficult to arouse per family. Upon arrival to [facility], workup was notable for: negative respiratory panel, leukocytosis (WBC 13.32). ProBNP 5671, hgb 10.2-->8.4, lactate 2.9,  potassium 8.2, creatinine 2.85, BUN 40, glucose 481, and Procal 6.26. CT head without acute process. CT chest with small left pleural effusion, bibasliar consolidation and atelectasis. CT A/P without contrast showed severe enlargement of the liver, s/p left nephrectomy. Initial ABG showed pH 7.048, pCO2 75, pO2 54, HCO3 21. Patient was intubated.  Repeat ABG without significant improvement (pH 7.018, pCO2 79.3, pO2 83.6, HCO3 20.4). Nephrology was consulted and ordered for patient to receive 20 units of Regular insulin IVP x 2 and initiation of an insulin gtt with improvement of potassium to 6.1. Patient also became hypotensive and required Levophed gtt. An UDS was ordered after admission and was positive for opioids. She was given two doses of Narcan on two separate occasions without a significant change in mentation. The decision was made to transfer the patient to our facility for ICU admission and higher level of care.    Time spent: 8 minutes. Electronically signed by YEIMI Aragon, 25, 11:12 AM EST.    Patient seen on arrival to the ICU.  She is unresponsive on mechanical ventilation and is unable to relay any history.  History per outside records as above.    History taken from: record    Past Medical History:   Diagnosis Date    A-fib     Abnormal ECG     Anemia     Anxiety     Asthma     Cancer     Ovarian    Depression     Diabetes mellitus     DVT (deep venous thrombosis)     Factor 5 Leiden mutation, heterozygous     Fibroid     GERD (gastroesophageal reflux disease)     Gout     H/O abdominal abscess     History of sepsis     History of transfusion     Hyperlipidemia     Hypothyroid     Kidney stone     Migraines     Neuropathy     Ovarian cancer 2021    Ovarian cyst     PE (pulmonary embolism)     Polycystic ovary syndrome     Preeclampsia     Rh incompatibility     Stroke     TIA (transient ischemic attack)     Urinary tract infection     Varicella        Past Surgical History:   Procedure Laterality Date    ABDOMINAL SURGERY      CARDIAC CATHETERIZATION       SECTION      CHOLECYSTECTOMY      COLONOSCOPY      ENDOSCOPY      EXTRACORPOREAL SHOCK WAVE LITHOTRIPSY (ESWL) Left 10/22/2021    Procedure: EXTRACORPOREAL SHOCKWAVE LITHOTRIPSY;  Surgeon: Milan Motley MD;  Location: [facility];  Service: Urology;   "Laterality: Left;    HYSTERECTOMY  2017    ovarian ca    LITHOTRIPSY      NEPHRECTOMY Left 10/2022    RIGHT OOPHORECTOMY      URETEROSCOPY LASER LITHOTRIPSY WITH STENT INSERTION Left 10/01/2021    Procedure: URETEROSCOPY WITH STENT PLACEMENT;  Surgeon: Milan Motley MD;  Location: [facility];  Service: Urology;  Laterality: Left;    URETEROSCOPY LASER LITHOTRIPSY WITH STENT INSERTION Left 04/27/2022    Procedure: URETEROSCOPY STENT REMOVAL;  Surgeon: Milan Motley MD;  Location: [facility];  Service: Urology;  Laterality: Left;    URETEROSCOPY LASER LITHOTRIPSY WITH STENT INSERTION Left 06/29/2022    Procedure: CYSTOSCOPY RETROGRADE LEFT WITH STENT PLACEMENT;  Surgeon: Milan Motley MD;  Location: [facility];  Service: Urology;  Laterality: Left;       Family History   Problem Relation Age of Onset    Hypertension Mother     Diabetes Father     Hypertension Father     Heart attack Father     No Known Problems Sister     No Known Problems Brother     No Known Problems Daughter     No Known Problems Son     No Known Problems Maternal Grandmother     No Known Problems Paternal Grandmother     Breast cancer Paternal Aunt     No Known Problems Maternal Grandfather     No Known Problems Paternal Grandfather     No Known Problems Cousin     Rheum arthritis Neg Hx     Osteoarthritis Neg Hx     Asthma Neg Hx     Heart failure Neg Hx     Hyperlipidemia Neg Hx     Migraines Neg Hx     Rashes / Skin problems Neg Hx     Seizures Neg Hx     Stroke Neg Hx     Thyroid disease Neg Hx        Social History     Socioeconomic History    Marital status:    Tobacco Use    Smoking status: Never     Passive exposure: Never    Smokeless tobacco: Never   Vaping Use    Vaping status: Never Used   Substance and Sexual Activity    Alcohol use: No    Drug use: Never     Comment: Pt has track marks on right arm. Pt states \"It's from my INR being drawn.\"     Sexual activity: Not Currently     Partners: Male     " Birth control/protection: None       Allergies   Allergen Reactions    Salvia Officinalis Itching, Other (See Comments) and Shortness Of Breath    Shellfish Allergy Anaphylaxis    Toradol [Ketorolac Tromethamine] Anaphylaxis and Hives    Tree Nut Anaphylaxis and Shortness Of Breath     WALNUTS    Haldol [Haloperidol] Hives and Mental Status Change    Tramadol Hives and Swelling    Amoxicillin Hives and Rash    Penicillins Hives and Rash       Past Medical History/Family History/Social History were reviewed by me and updates were made.     Review of Systems:    Review of 14 systems was completed with positives and pertinent negatives noted in the subjective section.  All other systems reviewed and are negative.         Objective    Vital Signs  Temp:  [98.2 °F (36.8 °C)-104 °F (40 °C)] 100.1 °F (37.8 °C)  Heart Rate:  [] 108  Resp:  [20-35] 22  BP: ()/(38-88) 121/51  Arterial Line BP: (101-149)/(52-85) 103/63  FiO2 (%):  [70 %-100 %] 70 %  There is no height or weight on file to calculate BMI.  Mode: VC+/AC  FiO2 (%):  [70 %-100 %] 70 %  S RR:  [16-26] 22  S VT:  [350 mL-450 mL] 385 mL  PEEP/CPAP (cm H2O):  [5 cm H20-14 cm H20] 14 cm H20  MAP (cm H2O):  [10-20] 18    Intake/Output Summary (Last 24 hours) at 1/2/2025 1953  Last data filed at 1/2/2025 1808  Gross per 24 hour   Intake 1017.42 ml   Output 1700 ml   Net -682.58 ml      Physical Exam:     General Appearance:  Sedated on ventilator, in no acute distress   Head:    Normocephalic, without obvious abnormality, atraumatic   Eyes:            Lids and lashes normal, conjunctivae and sclerae normal,    no icterus, no pallor, corneas clear, PERRL   ENMT:   Ears appear intact with no abnormalities noted    Oral endotracheal tube in place   Neck:   No adenopathy, supple, trachea midline, no thyromegaly,      no carotid bruit, no JVD   Lungs/resp:   On ventilator, symmetric chest rise, no crepitus, bilateral rhonchi, no chest wall tenderness                   Heart/CV:    Regular rhythm and normal rate, normal S1 and S2, no         murmur   Abdomen/GI:   Obese, soft, nontender, nondistended   G/U:     Deferred   Extremities/MSK:   No clubbing, cyanosis or edema.   No deformities.    Pulses:   Pulses palpable and equal bilaterally   Skin:   No bleeding, bruising or rash   Hem/Lymph:   No cervical or supraclavicular adenopathy.    Neurologic: Sedated.            Psychiatric: Sedated.     The above findings are documentation of my personal physical exam findings from today.   Electronically signed by:  Roly Garcia MD  01/02/25  19:53 EST     Results Review:     I reviewed the patient's new clinical results.   Results from last 7 days   Lab Units 01/02/25  1538 01/02/25  1314 01/02/25  0900 01/02/25  0453 01/01/25  2333 01/01/25  2145   SODIUM mmol/L 137 136 137 136   < > 133*   POTASSIUM mmol/L 5.7* 6.1* 6.1* 7.0*   < > 8.2*   CHLORIDE mmol/L 100 100 100 101   < > 98   CO2 mmol/L 22.0 21.0* 22.6 22.3   < > 19.5*   BUN mg/dL 37* 39* 40* 41*   < > 40*   CREATININE mg/dL 1.61* 1.69* 2.39* 2.50*   < > 2.85*   CALCIUM mg/dL 8.7 8.8 8.1* 8.4*   < > 8.3*   BILIRUBIN mg/dL  --   --  0.3 0.4  --  0.6   ALK PHOS U/L  --   --  137* 142*  --  129*   ALT (SGPT) U/L  --   --  71* 71*  --  60*   AST (SGOT) U/L  --   --  119* 136*  --  120*   GLUCOSE mg/dL 373* 377* 315* 314*   < > 481*    < > = values in this interval not displayed.     Results from last 7 days   Lab Units 01/02/25  1314 01/02/25  0900 01/01/25  2039   WBC 10*3/mm3 9.71 10.48 13.32*   HEMOGLOBIN g/dL 8.5* 8.4* 9.3*   HEMATOCRIT % 28.0* 28.1* 31.2*   PLATELETS 10*3/mm3 222 232 252     Results from last 7 days   Lab Units 01/02/25  1514   PH, ARTERIAL pH units 7.221*   PO2 ART mm Hg 98.1   PCO2, ARTERIAL mm Hg 59.8*   HCO3 ART mmol/L 24.5       I reviewed the patient's new imaging including images and reports.    CT chest 1/2/2025 shows bibasilar airspace opacities and cardiomegaly.  There is a small left pleural  effusion.    CT abdomen and pelvis shows enlarged liver.    Radiologist impression follows:    IMPRESSION:     CT CHEST:  BIBASILAR PROBABLE PNEUMONIA AND ATELECTASIS WITH SMALL LEFT PLEURAL  EFFUSION.     CT ABDOMEN AND PELVIS:  SEVERELY ENLARGED LIVER WITH FATTY INFILTRATION.  STATUS POST LEFT NEPHRECTOMY.    CT head 1/2/2025:    IMPRESSION:     NO ACUTE INTRACRANIAL ABNORMALITY.       Medication Review:   albumin human, 25 g, Intravenous, BID  cefepime, 2,000 mg, Intravenous, Q12H  chlorhexidine, 15 mL, Mouth/Throat, Q12H  famotidine, 20 mg, Intravenous, BID  metroNIDAZOLE, 500 mg, Intravenous, Q8H  mupirocin, 1 Application, Each Nare, BID  senna-docusate sodium, 2 tablet, Nasogastric, BID  sodium chloride, 10 mL, Intravenous, Q12H  sodium zirconium cyclosilicate, 10 g, Nasogastric, TID  [START ON 1/3/2025] vancomycin (dosing per levels), , Not Applicable, Daily      amiodarone, 1 mg/min, Last Rate: 1 mg/min (01/02/25 1731)   Followed by  amiodarone, 0.5 mg/min  fentanyl 10 mcg/mL,  mcg/hr, Last Rate: 100 mcg/hr (01/02/25 1808)  heparin, 9 Units/kg/hr, Last Rate: 9 Units/kg/hr (01/02/25 1543)  insulin, 0-100 Units/hr, Last Rate: 11 Units/hr (01/02/25 1904)  Midazolam 1 mg/mL 100mL NS, 1-10 mg/hr, Last Rate: 10 mg/hr (01/02/25 1705)  norepinephrine, 0.02-0.3 mcg/kg/min, Last Rate: 0.06 mcg/kg/min (01/02/25 1730)  Pharmacy to Dose Heparin,   Pharmacy to dose vancomycin,         Assessment & Plan    Active Hospital Problems    Diagnosis     **Acute respiratory failure with hypercapnia     Hyperkalemia     Sepsis     Respiratory acidosis with metabolic acidosis     Acute respiratory failure     Type 2 diabetes mellitus with hyperglycemia     MARIAA (acute kidney injury)      39 y.o. female with history of HLD, hypothyroidism, atrial fibrillation, history of PE/DVT, factor V Leiden mutation, stroke, presented to [facility] with AMS and difficult to arouse.  Evaluation there showed significant renal failure,  hyperglycemia, hypercapnic respiratory failure, and basilar pulmonary infiltrates concerning for pneumonia.  She was intubated for respiratory failure and placed on pressors for hypotension.  She was treated with shifting agents for hyperkalemia.  She was given Narcan without significant response.  She was subsequently transferred to [facility] ICU for ongoing management.    Plan:  1.  For acute mixed hypoxemic and hypercapnic respiratory failure: Continue mechanical ventilation.  Adjust settings to optimize ventilation and oxygenation and minimize barotrauma.  2.  For pulmonary infiltrates/basilar pneumonia unknown organism: Worrisome for aspiration.  Follow-up cultures.  Broad-spectrum antibiotics with cefepime plus Flagyl and vancomycin.  3.  For MARIAA: Consult nephrology.  Fluid resuscitation.  May need HD.  4.  For hyperkalemia: Status post shifting agents.  Monitor with resuscitation.  5.  For poorly controlled T2DM: Glucose monitoring and insulin drip protocol.  6.  For sepsis: Broad-spectrum antibiotics as above.  Pressors if needed.  Repeat lactic acid level.  7.  For history of atrial fibrillation/DVT/PE/factor V Leiden: Heparin drip for now but if hemoglobin stable transition to home Eliquis.  8.  For chronic anemia: Monitor.  Transfuse if necessary.    Patient is critically ill secondary multisystem organ failure and has high risk of life-threatening decline in condition.  As such, she requires continuous monitoring frequent reassessment for consideration of adjustment management in order to minimize that risk.  I personally reassessed her multiple times today.    Critical care time : 44 minutes spent by me personally.(This excludes time spent performing separately reportable procedures and services). Critical care time includes high complexity decision making to assess, manipulate, and support vital organ system failure in this individual who has impairment of one or more vital organ systems such that there is a  high probability of imminent or life threatening deterioration in the patient’s condition.    Electronically signed by:  Roly Garcia MD  01/02/25  19:53 EST        Please note that portions of this note were completed with [employer] - a voice recognition program.     Electronically signed by Roly Garcia MD at 01/03/25 1843       Current Facility-Administered Medications   Medication Dose Route Frequency Provider Last Rate Last Admin    acetaminophen (TYLENOL) 160 MG/5ML oral solution 650 mg  650 mg Nasogastric Q6H PRN Case, Tiffanie V., DO   650 mg at 01/22/25 1804    amitriptyline (ELAVIL) tablet 25 mg  25 mg Nasogastric Nightly Diana Ghotra MD   25 mg at 01/26/25 2054    amLODIPine (NORVASC) tablet 10 mg  10 mg Nasogastric Daily Diana Ghotra MD   10 mg at 01/27/25 0951    castor oil-balsam peru (VENELEX) ointment 1 Application  1 Application Topical Q12H Case, Tiffanie V., DO   1 Application at 01/27/25 0952    cholestyramine light packet 4 g  1 packet Oral Q8H Soo Casas MD   4 g at 01/27/25 1358    dextrose (D50W) (25 g/50 mL) IV injection 25 g  25 g Intravenous Q15 Min PRN Case, Tiffanie V., DO        DULoxetine (CYMBALTA) DR capsule 30 mg  30 mg Oral Nightly Diana Ghotra MD   30 mg at 01/26/25 2054    DULoxetine (CYMBALTA) DR capsule 60 mg  60 mg Oral Daily Diana Ghotra MD   60 mg at 01/27/25 0951    Enoxaparin Sodium (LOVENOX) syringe 120 mg  120 mg Subcutaneous Q12H Case, Tiffanie V., DO   120 mg at 01/27/25 1238    gabapentin (NEURONTIN) capsule 300 mg  300 mg Nasogastric Q8H Case, Tiffanie V., DO   300 mg at 01/27/25 1358    glucagon (GLUCAGEN) injection 1 mg  1 mg Intramuscular Q15 Min PRN Case, Tiffanie V., DO        HYDROcodone-acetaminophen (NORCO) 5-325 MG per tablet 1 tablet  1 tablet Nasogastric Q4H PRN Diana Ghotra MD   1 tablet at 01/26/25 1252    HYDROmorphone (DILAUDID) injection 0.5 mg  0.5 mg Intravenous Q4H PRN  Soo Casas MD   0.5 mg at 01/27/25 0950    hydrOXYzine (ATARAX) tablet 25 mg  25 mg Oral Nightly PRN Izzy Wells PA-C   25 mg at 01/27/25 0016    insulin glargine (LANTUS, SEMGLEE) injection 35 Units  35 Units Subcutaneous Q12H Diana Ghotra MD   35 Units at 01/27/25 0951    insulin regular (humuLIN R,novoLIN R) injection 15 Units  15 Units Subcutaneous Q6H Lynne Neff, YEIMI   15 Units at 01/27/25 1358    insulin regular (humuLIN R,novoLIN R) injection 4-24 Units  4-24 Units Subcutaneous Q6H Case, Tiffanie V., DO   8 Units at 01/27/25 1359    ipratropium (ATROVENT) nebulizer solution 0.5 mg  0.5 mg Nebulization Q4H PRN Case, Tiffanie V., DO        ipratropium-albuterol (DUO-NEB) nebulizer solution 3 mL  3 mL Nebulization Q4H PRN Case, Tiffanie V., DO   3 mL at 01/24/25 1440    lactobacillus acidophilus (RISAQUAD) capsule 1 capsule  1 capsule Nasogastric Daily Diana Ghotra MD   1 capsule at 01/27/25 0951    loperamide (IMODIUM) capsule 2 mg  2 mg Oral Q4H PRN Soo Casas MD        Magnesium Standard Dose Replacement - Follow Nurse / BPA Driven Protocol   Not Applicable PRN Case, Tiffanie V., DO        meclizine (ANTIVERT) tablet 25 mg  25 mg Nasogastric TID PRN Diana Ghotra MD   25 mg at 01/22/25 1251    melatonin tablet 5 mg  5 mg Oral Nightly PRN Izzy Wells PA-C   5 mg at 01/26/25 2202    metoclopramide (REGLAN) injection 10 mg  10 mg Intravenous Q6H Diana Ghotra MD   10 mg at 01/27/25 1238    metoprolol succinate XL (TOPROL-XL) 24 hr tablet 50 mg  50 mg Oral BID Diana Ghotra MD   50 mg at 01/27/25 0951    midazolam (VERSED) injection 2 mg  2 mg Intravenous Q1H PRN Case, Tiffanie V., DO   2 mg at 01/24/25 0434    Nutrisource fiber pack 1 packet  1 packet Per G Tube TID Ritika Mendez, LOUIE        nystatin (MYCOSTATIN) powder   Topical Q12H Case, Tiffanie V., DO   Given at 01/27/25 0951    ondansetron (ZOFRAN)  injection 4 mg  4 mg Intravenous Q6H PRN Case, Tiffanie V., DO   4 mg at 25 1808    oxyCODONE (ROXICODONE) 5 MG/5ML solution 5 mg  5 mg Oral Q4H PRN Soo Casas MD   5 mg at 25 0558    Polyvinyl Alcohol-Povidone PF (ARTIFICIAL TEARS) 1.4-0.6 % ophthalmic solution 2 drop  2 drop Both Eyes Q2H PRN Case, Tiffanie V., DO        Potassium Replacement - Follow Nurse / BPA Driven Protocol   Not Applicable PRN Case, Tiffanie V., DO        Psyllium (METAMUCIL MULTIHEALTH FIBER) 51.7 % packet 1 packet  1 packet Oral BID Soo Casas MD   1 packet at 25 0951    sodium chloride 0.9 % flush 10 mL  10 mL Intravenous PRN Case, Tiffanie V., DO   10 mL at 25 0950    sodium chloride 0.9 % flush 20 mL  20 mL Intravenous PRN Case, Tiffanie V., DO        [Held by provider] torsemide (DEMADEX) tablet 40 mg  40 mg Oral Daily Soo Casas MD            Physician Progress Notes (most recent note)        Soo Casas MD at 25 0557              [facility] Medicine Services  PROGRESS NOTE    Patient Name: Natalia Arzate  : 1985  MRN: [id number]    Date of Admission: 2025  Primary Care Physician: Bladimir Calle PA-C    Subjective   Subjective     CC:  Resp failure    HPI:  Patient was seen and examined this morning.  Having mild pain in her right upper extremity but improved from yesterday.  Still having diarrhea but slowing down.    Objective   Objective     Vital Signs:   Temp:  [97.6 °F (36.4 °C)-98.9 °F (37.2 °C)] 98.4 °F (36.9 °C)  Heart Rate:  [] 107  Resp:  [16-18] 18  BP: (124-148)/(82-95) 127/91  Flow (L/min) (Oxygen Therapy):  [4] 4     Physical Exam:  General: Chronically ill looking, not in distress, conversant and cooperative  Head: Atraumatic and normocephalic  Eyes: No Icterus. No pallor  Ears:  Ears appear intact with no abnormalities noted  Throat: No oral lesions, no thrush  Neck: Supple, trachea midline  Lungs:  Clear to auscultation bilaterally, equal air entry, no wheezing or crackles  Heart:  Normal S1 and S2, no murmur, no gallop, No JVD, no lower extremity swelling  Abdomen:  Soft, no tenderness, no organomegaly, normal bowel sounds, no organomegaly  Extremities: pulses equal bilaterally  Skin: No bleeding, bruising or rash, normal skin turgor and elasticity  Neurologic: Cranial nerves appear intact with no evidence of facial asymmetry, normal motor and sensory functions in all 4 extremities  Psych: Alert and oriented x 3, normal mood neurologic: Oriented x 3,  Cranial Nerves grossly intact to confrontation, speech clear  Skin: No rashes      Results Reviewed:  LAB RESULTS:      Lab 01/26/25 0224 01/24/25  1603 01/22/25  1201 01/21/25  1540 01/20/25  0658 01/19/25  2045 01/19/25  1810   WBC 6.05  --  3.90 4.79 4.82  --   --    HEMOGLOBIN 9.7*  --  9.8* 9.6* 8.9*  --   --    HEMATOCRIT 30.1*  --  31.2* 30.9* 28.9*  --   --    PLATELETS 188  --  195 215 210  --   --    NEUTROS ABS 3.57  --  2.43  --   --   --   --    IMMATURE GRANS (ABS) 0.01  --  0.04  --   --   --   --    LYMPHS ABS 1.48  --  0.53*  --   --   --   --    MONOS ABS 0.57  --  0.65  --   --   --   --    EOS ABS 0.38  --  0.22  --   --   --   --    MCV 90.7  --  92.6 93.6 93.5  --   --    CRP 3.58* 2.18*  --   --   --   --   --    HEPARIN ANTI-XA  --   --   --  0.81 0.52 0.65 0.10*         Lab 01/26/25 0224 01/25/25  0627 01/24/25  1603 01/23/25  0453 01/22/25  1201 01/21/25  1540 01/20/25  0658   SODIUM 126*  --  132*  --  131* 133* 131*   POTASSIUM 4.1  --  4.5  --  4.0 3.9 3.7   CHLORIDE 86*  --  90*  --  90* 90* 89*   CO2 27.0  --  28.0  --  29.0 30.0* 30.0*   ANION GAP 13.0  --  14.0  --  12.0 13.0 12.0   BUN 28*  --  22*  --  13 12 14   CREATININE 0.58  --  0.52*  --  0.54* 0.58 0.61   EGFR 119.0  --  122.1  --  121.0 119.0 117.5   GLUCOSE 241*  --  231*  --  215* 207* 194*   CALCIUM 10.8*  --  10.4  --  9.9 9.8 9.5   MAGNESIUM 1.2* 1.8 1.2* 2.3  1.0* 1.8 1.3*   PHOSPHORUS 4.5  --  5.0*  --  4.2 4.2  --          Lab 01/26/25  0224 01/24/25  1603   TOTAL PROTEIN 8.1 8.1   ALBUMIN 4.1 3.9   GLOBULIN 4.0 4.2   ALT (SGPT) 38* 35*   AST (SGOT) 64* 70*   BILIRUBIN 0.6 0.5   ALK PHOS 140* 134*             Lab 01/24/25  1603   CHOLESTEROL 264*   TRIGLYCERIDES 739*                 Brief Urine Lab Results  (Last result in the past 365 days)        Color   Clarity   Blood   Leuk Est   Nitrite   Protein   CREAT   Urine HCG        01/02/25 1213             52.3         01/02/25 1213 Yellow   Cloudy   Moderate (2+)   Negative   Negative   100 mg/dL (2+)                   Microbiology Results Abnormal       Procedure Component Value - Date/Time    Respiratory Culture - Wash, Lung, Left Lower Lobe [[id number]]  (Abnormal)  (Susceptibility) Collected: 01/14/25 1608    Lab Status: Final result Specimen: Wash from Lung, Left Lower Lobe Updated: 01/17/25 1221     Respiratory Culture Light growth (2+) Klebsiella pneumoniae ESBL     Comment:   Consider infectious disease consult.  Susceptibility results may not correlate to clinical outcomes.         Light growth (2+) Staphylococcus aureus, MRSA     Comment:   Considering site of infection and appropriate dosing, oxacillin (or cefoxitin) results can be applied to cefazolin, nafcillin, ampicillin/sulbactam, dicloxacillin, amoxicillin/clavulanate, cephalexin, and cefpodoxime.  Methicillin resistant Staphylococcus aureus, Patient may be an isolation risk.         Rare growth Normal Respiratory Margo     Gram Stain Many (4+) WBCs per low power field      Rare (1+) Epithelial cells per low power field      Rare (1+) Gram positive cocci in pairs and clusters    Susceptibility        Klebsiella pneumoniae ESBL      KELSIE      Ciprofloxacin Resistant      Ertapenem Susceptible      Levofloxacin Resistant      Meropenem Susceptible      Tetracycline Resistant      Trimethoprim + Sulfamethoxazole Resistant                       Susceptibility         Staphylococcus aureus, MRSA      KELSIE      Clindamycin Resistant      Linezolid Susceptible      Oxacillin Resistant      Tetracycline Susceptible      Trimethoprim + Sulfamethoxazole Susceptible      Vancomycin Susceptible                       Susceptibility Comments       Klebsiella pneumoniae ESBL    With the exception of urinary-sourced infections, aminoglycosides should not be used as monotherapy.      Staphylococcus aureus, MRSA    This isolate is presumed to be clindamycin resistant based on detection of inducible clindamycin resistance.  Clindamycin may still be effective in some patients.  This isolate is presumed to be clindamycin resistant based on detection of inducible clindamycin resistance.  Clindamycin may still be effective in some patients.               Respiratory Culture - Sputum, ET Suction [[id number]]  (Abnormal) Collected: 01/14/25 6076    Lab Status: Final result Specimen: Sputum from ET Suction Updated: 01/17/25 1221     Respiratory Culture Light growth (2+) Klebsiella pneumoniae ESBL     Comment:   Consider infectious disease consult.  Susceptibility results may not correlate to clinical outcomes.         Light growth (2+) Staphylococcus aureus, MRSA     Comment:   Considering site of infection and appropriate dosing, oxacillin (or cefoxitin) results can be applied to cefazolin, nafcillin, ampicillin/sulbactam, dicloxacillin, amoxicillin/clavulanate, cephalexin, and cefpodoxime.  Methicillin resistant Staphylococcus aureus, Patient may be an isolation risk.        Gram Stain Many (4+) WBCs per low power field      Rare (1+) Epithelial cells per low power field      Few (2+) Gram positive cocci in pairs, chains and clusters    Narrative:      Refer to LLL Wash culture for MICS.     Blood Culture - Blood, Blood, Arterial Line [[id number]]  (Abnormal) Collected: 01/14/25 1434    Lab Status: Final result Specimen: Blood, Arterial Line Updated: 01/17/25 0636     Blood Culture  Staphylococcus, coagulase negative     Isolated from Anaerobic Bottle     Gram Stain Anaerobic Bottle Gram positive cocci in pairs and clusters    Narrative:      Probable contaminant requires clinical correlation, susceptibility not performed unless requested by physician.      Blood Culture ID, PCR - Blood, Blood, Arterial Line [[id number]]  (Abnormal) Collected: 01/14/25 1434    Lab Status: Final result Specimen: Blood, Arterial Line Updated: 01/16/25 0657     BCID, PCR Staph spp, not aureus or lugdunensis. Identification by BCID2 PCR.     BOTTLE TYPE Anaerobic Bottle    Blood Culture - Blood, Arm, Left [[id number]]  (Abnormal) Collected: 01/05/25 1305    Lab Status: Final result Specimen: Blood from Arm, Left Updated: 01/07/25 1100     Blood Culture Staphylococcus, coagulase negative     Isolated from Anaerobic Bottle     Gram Stain Anaerobic Bottle Gram positive cocci in clusters    Narrative:      Less than seven (7) mL's of blood was collected.  Insufficient quantity may yield false negative results.  Probable contaminant requires clinical correlation, susceptibility not performed unless requested by physician.    Blood Culture ID, PCR - Blood, Arm, Left [[id number]]  (Abnormal) Collected: 01/05/25 1305    Lab Status: Final result Specimen: Blood from Arm, Left Updated: 01/06/25 1435     BCID, PCR Staph spp, not aureus or lugdunensis. Identification by BCID2 PCR.     BOTTLE TYPE Anaerobic Bottle    Respiratory Culture - Bronchial Wash, Lung, Left Lower Lobe [[id number]]  (Abnormal)  (Susceptibility) Collected: 01/04/25 0755    Lab Status: Final result Specimen: Bronchial Wash from Lung, Left Lower Lobe Updated: 01/06/25 0908     Respiratory Culture Scant growth (1+) Staphylococcus aureus, MRSA     Comment:   Methicillin resistant Staphylococcus aureus, Patient may be an isolation risk.         No Normal Respiratory Margo     Gram Stain Moderate (3+) WBCs seen      Rare (1+) Gram positive cocci in pairs and  clusters    Susceptibility        Staphylococcus aureus, MRSA      KELSIE      Clindamycin Resistant      Linezolid Susceptible      Oxacillin Resistant      Tetracycline Susceptible      Trimethoprim + Sulfamethoxazole Susceptible      Vancomycin Susceptible                       Susceptibility Comments       Staphylococcus aureus, MRSA    This isolate is presumed to be clindamycin resistant based on detection of inducible clindamycin resistance.  Clindamycin may still be effective in some patients.  This isolate is presumed to be clindamycin resistant based on detection of inducible clindamycin resistance.  Clindamycin may still be effective in some patients.               Respiratory Culture - Sputum, ET Suction [[id number]]  (Abnormal)  (Susceptibility) Collected: 01/02/25 1212    Lab Status: Final result Specimen: Sputum from ET Suction Updated: 01/04/25 0939     Respiratory Culture Moderate growth (3+) Staphylococcus aureus, MRSA      No Normal Respiratory Margo     Gram Stain Many (4+) WBCs per low power field      Rare (1+) Epithelial cells per low power field      Many (4+) Gram positive cocci in pairs and clusters    Susceptibility        Staphylococcus aureus, MRSA      KELSIE      Clindamycin Resistant      Linezolid Susceptible      Oxacillin Resistant      Tetracycline Susceptible      Trimethoprim + Sulfamethoxazole Susceptible      Vancomycin Susceptible                       Susceptibility Comments       Staphylococcus aureus, MRSA    This isolate is presumed to be clindamycin resistant based on detection of inducible clindamycin resistance.  Clindamycin may still be effective in some patients.               MRSA Screen, PCR (Inpatient) - Swab, Nares [[id number]]  (Abnormal) Collected: 01/02/25 1246    Lab Status: Final result Specimen: Swab from Nares Updated: 01/02/25 1504     MRSA PCR Positive    Narrative:      The negative predictive value of this diagnostic test is high and should only be used to  consider de-escalating anti-MRSA therapy. A positive result may indicate colonization with MRSA and must be correlated clinically.            No radiology results from the last 24 hrs    Results for orders placed during the hospital encounter of 01/02/25    Adult Transthoracic Echo Complete W/ Cont if Necessary Per Protocol    Interpretation Summary    Left ventricular systolic function is normal. Calculated left ventricular EF = 59.3% Left ventricular ejection fraction appears to be 61 - 65%.    Left ventricular wall thickness is consistent with borderline concentric hypertrophy.    Left ventricular diastolic function was normal.    Mild aortic valve stenosis is present.    Peak velocity of the flow distal to the aortic valve is 292 cm/s. Aortic valve mean pressure gradient is 20 mmHg.    Estimated right ventricular systolic pressure from tricuspid regurgitation is normal (<35 mmHg).      Current medications:  Scheduled Meds:amitriptyline, 25 mg, Nasogastric, Nightly  amLODIPine, 10 mg, Nasogastric, Daily  bumetanide, 1 mg, Intravenous, Daily  castor oil-balsam peru, 1 Application, Topical, Q12H  cholestyramine light, 1 packet, Oral, Q8H  DULoxetine, 30 mg, Oral, Nightly  DULoxetine, 60 mg, Oral, Daily  enoxaparin, 120 mg, Subcutaneous, Q12H  gabapentin, 300 mg, Nasogastric, Q8H  insulin glargine, 35 Units, Subcutaneous, Q12H  insulin regular, 12 Units, Subcutaneous, Q6H  insulin regular, 4-24 Units, Subcutaneous, Q6H  lactobacillus acidophilus, 1 capsule, Nasogastric, Daily  magnesium sulfate, 2 g, Intravenous, Q2H  metoclopramide, 10 mg, Intravenous, Q6H  metoprolol succinate XL, 50 mg, Oral, BID  nystatin, , Topical, Q12H      Continuous Infusions:   PRN Meds:.  acetaminophen    dextrose    glucagon (human recombinant)    HYDROcodone-acetaminophen    HYDROmorphone    ipratropium    ipratropium-albuterol    loperamide    Magnesium Standard Dose Replacement - Follow Nurse / BPA Driven Protocol    meclizine     midazolam    ondansetron    oxyCODONE    Polyvinyl Alcohol-Povidone PF    Potassium Replacement - Follow Nurse / BPA Driven Protocol    sodium chloride    sodium chloride    Assessment & Plan   Assessment & Plan     Active Hospital Problems    Diagnosis  POA    **Acute respiratory failure with hypercapnia [J96.02]  Yes    Sepsis [A41.9]  Yes    Type 2 diabetes mellitus with hyperglycemia [E11.65]  Yes    Hypothyroid [E03.9]  Yes    Factor 5 Leiden mutation, heterozygous [D68.51]  Yes    MARIAA (acute kidney injury) [N17.9]  Yes    Primary hypertension [I10]  Yes    PTSD (post-traumatic stress disorder) [F43.10]  Yes    History of DVT in adulthood [Z86.718]  Not Applicable      Resolved Hospital Problems   No resolved problems to display.        Brief Hospital Course to date:  Natalia Arzate is a 39 y.o. female with a history of HLD, Hypothyroidism, Afib, PE/DVT, Factor V Leiden mutation, and stroke who presented to [facility] with altered mental status. Labs there were significant for renal failure, hyperglycemia, and hypercapnic respiratory failure. Dx with  pneumonia and resp failure.  She was intubated and required the initiation of vasopressors. She was transferred to [facility] on 1/2/25 for ongoing care.    Acute hypoxic resp failure, improving  Severe sepsis with end organ failure- renal failure and resp failure, improved  MRSA PNA--s/p vanc/linezolid  ESBL Klebsiella PNA  S/p intubation, extubated from 1/1/2025 till 1/17/2025  Pt underwent bronch on 1/14 with significant mucus plugging, post CXR with improvement in L lung aeration.  S/P merrem, vancomycin and linezolid for ESBL pneumonia and MRSA pneumonia respectively  Currently on regular NC--cont pulmonary toilet, wean O2 as tolerated    Gemella sanguinis Bacteremia  1 bottle Gemella sanguinis--unclear significance  S/p 10 days amp/unasyn.    Hypoosmolar hyponatremia  Secondary to ongoing diuretic therapy with Bumex and diarrhea  Hold Bumex IV albumin and monitor  BMP with a.m. labs    MARIAA due to severe sepsis  S/P L nephrectomy in   Pt required CRRT ~ 25-25   Renal function improving, no need for further HD    Severe dysphagia  Currently with keofeed getting tube feeds.  Ok for 4oz puree 3x/day.  Continue Zofran, reglan.  Will also try meclizine, as having some vertigo sx.  Speech therapy following    Right arm pain  X-rays to forearm and humerus neg for fracture  PRN dilaudid IV  Pt is not able to fit in MRI  Doppler ultrasound right upper extremity with no evidence of DVT    Factor V Leiden mutation  History of PE/DVT  History of stroke  Continue home lovenox    DM  A1c 9.1%  Continue Lantus and regular insulin while on tube feeds.    Hypertension  History A-fib  Restarted home metoprolol, norvasc  Continue lovenox    Diarrhea  C diff neg  Continue probiotic  Likely 2/2 abx and tube feeds  Continue Imodium prn  Add cholestyramine and metamucil    History of PTSD  Continue home cymbalta and elavil    Expected Discharge Location and Transportation: To be determined  Expected Discharge   Expected Discharge Date: 2025; Expected Discharge Time:      VTE Prophylaxis:  Pharmacologic & mechanical VTE prophylaxis orders are present.         AM-PAC 6 Clicks Score (PT): 8 (25 0824)    CODE STATUS:   Code Status and Medical Interventions: CPR (Attempt to Resuscitate); Full Support   Ordered at: 25     Code Status (Patient has no pulse and is not breathing):    CPR (Attempt to Resuscitate)     Medical Interventions (Patient has pulse or is breathing):    Full Support       Soo Casas MD  25        Electronically signed by Soo Casas MD at 25 1500          Physical Therapy Notes (most recent note)        Yolande Nassar, PT at 25 1034  Version 1 of 1         Patient Name: Natalia Arzate  : 1985    MRN: [id number]                              Today's Date: 2025       Admit Date: 2025    Visit  Dx:     ICD-10-CM ICD-9-CM   1. Respiratory acidosis with metabolic acidosis  E87.4 276.3   2. Acute respiratory failure with hypoxia  J96.01 518.81   3. MARIAA (acute kidney injury)  N17.9 584.9   4. Pharyngeal dysphagia  R13.13 787.23     Patient Active Problem List   Diagnosis    Nephrolithiasis    Ovarian teratoma, right    Other chronic pain    Pelvic pain    History of DVT in adulthood    History of pulmonary embolism    Ureteral calculus    Ischemic cerebrovascular accident (CVA)    Primary hypertension    Left lumbar radiculopathy    PTSD (post-traumatic stress disorder)    MARIAA (acute kidney injury)    Anemia    Dyslipidemia    Hypothyroid    Other secondary gout, multiple sites    Factor 5 Leiden mutation, heterozygous    Vitamin D deficiency    Vitamin B 12 deficiency    Type 2 diabetes mellitus with hyperglycemia    Hoarseness, persistent    Ureterolithiasis    Acute cystitis without hematuria    Hepatic steatosis    Right flank pain, chronic    Detrusor instability of bladder    Frequency of micturition    Acute respiratory failure with hypercapnia    Hyperkalemia    Sepsis    Respiratory acidosis with metabolic acidosis    Acute respiratory failure    Diabetes mellitus type 2, insulin dependent    Diabetic peripheral neuropathy associated with type 2 diabetes mellitus     Past Medical History:   Diagnosis Date    A-fib     Abnormal ECG     Anemia     Anxiety     Asthma     Cancer     Ovarian    Depression     Diabetes mellitus     DVT (deep venous thrombosis)     Factor 5 Leiden mutation, heterozygous     Fibroid     GERD (gastroesophageal reflux disease)     Gout     H/O abdominal abscess     History of sepsis     History of transfusion     Hyperlipidemia     Hypothyroid     Kidney stone     Migraines     Neuropathy     Ovarian cancer 02/2021    Ovarian cyst     PE (pulmonary embolism)     Polycystic ovary syndrome     Preeclampsia     Rh incompatibility     Stroke     TIA (transient ischemic attack)      Urinary tract infection     Varicella      Past Surgical History:   Procedure Laterality Date    ABDOMINAL SURGERY      CARDIAC CATHETERIZATION       SECTION      CHOLECYSTECTOMY      COLONOSCOPY      ENDOSCOPY      EXTRACORPOREAL SHOCK WAVE LITHOTRIPSY (ESWL) Left 10/22/2021    Procedure: EXTRACORPOREAL SHOCKWAVE LITHOTRIPSY;  Surgeon: Milan Motley MD;  Location: [facility];  Service: Urology;  Laterality: Left;    HYSTERECTOMY  2017    ovarian ca    INSERT CENTRAL LINE AT BEDSIDE  2025    LITHOTRIPSY      NEPHRECTOMY Left 10/2022    RIGHT OOPHORECTOMY      URETEROSCOPY LASER LITHOTRIPSY WITH STENT INSERTION Left 10/01/2021    Procedure: URETEROSCOPY WITH STENT PLACEMENT;  Surgeon: Milan Motley MD;  Location: [facility];  Service: Urology;  Laterality: Left;    URETEROSCOPY LASER LITHOTRIPSY WITH STENT INSERTION Left 2022    Procedure: URETEROSCOPY STENT REMOVAL;  Surgeon: Milan Motley MD;  Location: [facility];  Service: Urology;  Laterality: Left;    URETEROSCOPY LASER LITHOTRIPSY WITH STENT INSERTION Left 2022    Procedure: CYSTOSCOPY RETROGRADE LEFT WITH STENT PLACEMENT;  Surgeon: Milan Motley MD;  Location: [facility];  Service: Urology;  Laterality: Left;      General Information       Row Name 25 1110          Physical Therapy Time and Intention    Document Type therapy note (daily note)  -ER     Mode of Treatment physical therapy  -ER       Row Name 25 1110          General Information    Patient Profile Reviewed yes  -ER     Existing Precautions/Restrictions fall;oxygen therapy device and L/min;other (see comments)  Contact; NG; back wound; hx of CVA with L sided deficits  -ER       Row Name 25 1110          Cognition    Orientation Status (Cognition) oriented x 3  -ER       Row Name 25 1110          Safety Issues/Impairments Affecting Functional Mobility    Impairments Affecting Function (Mobility)  "balance;coordination;endurance/activity tolerance;grasp;motor control;motor planning;pain;postural/trunk control;range of motion (ROM);sensation/sensory awareness;shortness of breath;strength  -ER               User Key  (r) = Recorded By, (t) = Taken By, (c) = Cosigned By      Initials Name Provider Type    ER Yolande Nassar, PT Physical Therapist                   Mobility       Row Name 01/27/25 1111          Bed Mobility    Bed Mobility rolling left;rolling right;supine-sit;sit-supine  -ER     Rolling Left [place] (Bed Mobility) minimum assist (75% patient effort)  -ER     Rolling Right [place] (Bed Mobility) minimum assist (75% patient effort)  -ER     Scooting/Bridging [place] (Bed Mobility) moderate assist (50% patient effort);2 person assist  -ER     Supine-Sit [place] (Bed Mobility) moderate assist (50% patient effort);verbal cues;1 person assist  -ER     Sit-Supine [place] (Bed Mobility) moderate assist (50% patient effort);2 person assist  -ER     Assistive Device (Bed Mobility) bed rails;repositioning sheet;head of bed elevated  -ER     Comment, (Bed Mobility) extensive cues and aid needed today.  -ER       Row Name 01/27/25 1111          Bed-Chair Transfer    Comment, (Bed-Chair Transfer) deferred  -ER       Row Name 01/27/25 1111          Sit-Stand Transfer    Sit-Stand [place] (Transfers) minimum assist (75% patient effort);2 person assist  -ER     Assistive Device (Sit-Stand Transfers) walker, front-wheeled  -ER     Comment, (Sit-Stand Transfer) pt stood 2x with minax2. no LOB noted. pt needed instruction to not \"plop\" when sitting  -ER       Row Name 01/27/25 1111          Gait/Stairs (Locomotion)    [place] Level (Gait) minimum assist (75% patient effort);2 person assist  -ER     Assistive Device (Gait) walker, front-wheeled  -ER     Distance in Feet (Gait) 1  -ER     Comment, (Gait/Stairs) pt able to take a very small 1 step forward and backward but had intense " fear of falling and deferred further ambulation. pt practiced pre gait activities of lateral weight shift and anterior/posterior weight shift. pt reports she felt dizzy with weight shifting  -ER               User Key  (r) = Recorded By, (t) = Taken By, (c) = Cosigned By      Initials Name Provider Type    ER Yolande Nassar PT Physical Therapist                   Obj/Interventions       Row Name 01/27/25 1117          Balance    Balance Interventions sitting;standing;sit to stand;supported;static;dynamic;minimal challenge  -ER     Comment, Balance pt able to do a few rounds of standing balance training  -ER               User Key  (r) = Recorded By, (t) = Taken By, (c) = Cosigned By      Initials Name Provider Type    ER Yolande Nassar PT Physical Therapist                   Goals/Plan    No documentation.                  Clinical Impression       Row Name 01/27/25 1121          Pain    Pretreatment Pain Rating 6/10  -ER     Posttreatment Pain Rating 6/10  -ER     Pain Location back  -ER     Pain Side/Orientation generalized  -ER     Pain Management Interventions nursing notified  -ER     Response to Pain Interventions activity participation with tolerable pain  -ER       Row Name 01/27/25 1121          Plan of Care Review    Plan of Care Reviewed With patient  -ER     Progress improving  -ER     Outcome Evaluation Pt able to partcipate with all planned therapy today. Pt is making good progress to walking. Pt remains limited by poor activity tolerance and weakness. Continue PT POC as tolerated to bring her back to baseline. Continue to recommend IRF upon d/c.  -ER       Row Name 01/27/25 1121          Vital Signs    Post Systolic BP Rehab 132  -ER     Post Treatment Diastolic   -ER     Posttreatment Heart Rate (beats/min) 104  -ER     O2 Delivery Pre Treatment room air  -ER     O2 Delivery Intra Treatment room air  -ER     Post SpO2 (%) 90  -ER     O2 Delivery Post Treatment room air  -ER     Pre Patient  Position Supine  -ER     Intra Patient Position Standing  -ER     Post Patient Position Sitting  -ER       Row Name 01/27/25 1121          Positioning and Restraints    Pre-Treatment Position in bed  -ER     Post Treatment Position bed  -ER     In Bed notified nsg;supine;call light within reach;with family/caregiver;exit alarm on;side rails up x3  -ER               User Key  (r) = Recorded By, (t) = Taken By, (c) = Cosigned By      Initials Name Provider Type    ER Yolande Nassar PT Physical Therapist                   Outcome Measures       Row Name 01/27/25 1126          How much help from another person do you currently need...    Turning from your back to your side while in flat bed without using bedrails? 3  -ER     Moving from lying on back to sitting on the side of a flat bed without bedrails? 2  -ER     Moving to and from a bed to a chair (including a wheelchair)? 1  -ER     Standing up from a chair using your arms (e.g., wheelchair, bedside chair)? 3  -ER     Climbing 3-5 steps with a railing? 1  -ER     To walk in hospital room? 1  -ER     AM-PAC 6 Clicks Score (PT) 11  -ER     Highest Level of Mobility Goal 4 --> Transfer to chair/commode  -ER       Row Name 01/27/25 1126          Functional Assessment    Outcome Measure Options AM-PAC 6 Clicks Basic Mobility (PT)  -ER               User Key  (r) = Recorded By, (t) = Taken By, (c) = Cosigned By      Initials Name Provider Type    Yolande Sandra, PT Physical Therapist                                 Physical Therapy Education       Title: PT OT SLP Therapies (In Progress)       Topic: Physical Therapy (In Progress)       Point: Mobility training (In Progress)       Learning Progress Summary            Patient Acceptance, E, VU by ER at 1/27/2025 1127    Comment: progress, POC, need for PT    Acceptance, E, VU,NR by ML at 1/24/2025 1151    Acceptance, E, VU,NR by NS at 1/22/2025 1613    Comment: pt encouraged to continue with HEP between therapy  sessions    Acceptance, E, NR by ML at 1/19/2025 1124   Family Acceptance, E, NR by ML at 1/19/2025 1124                      Point: Home exercise program (Done)       Learning Progress Summary            Patient Acceptance, E, VU by ER at 1/27/2025 1127    Comment: progress, POC, need for PT    Acceptance, E, VU,NR by ML at 1/24/2025 1151    Acceptance, E, VU,NR by NS at 1/22/2025 1613    Comment: pt encouraged to continue with HEP between therapy sessions                      Point: Body mechanics (Done)       Learning Progress Summary            Patient Acceptance, E, VU by ER at 1/27/2025 1127    Comment: progress, POC, need for PT    Acceptance, E, VU,NR by ML at 1/24/2025 1151    Acceptance, E, VU,NR by NS at 1/22/2025 1613    Comment: pt encouraged to continue with HEP between therapy sessions                      Point: Precautions (In Progress)       Learning Progress Summary            Patient Acceptance, E, VU by ER at 1/27/2025 1127    Comment: progress, POC, need for PT    Acceptance, E, VU,NR by NS at 1/22/2025 1613    Comment: pt encouraged to continue with HEP between therapy sessions    Acceptance, E, NR by ML at 1/19/2025 1124   Family Acceptance, E, NR by ML at 1/19/2025 1124                                      User Key       Initials Effective Dates Name Provider Type Discipline    NS 06/16/21 -  Germaine Thao, PT Physical Therapist PT    ML 04/22/21 -  Debbi Hall Physical Therapist PT    ER 12/13/24 -  Yolande Nassar, PT Physical Therapist PT                  PT Recommendation and Plan     Progress: improving  Outcome Evaluation: Pt able to partcipate with all planned therapy today. Pt is making good progress to walking. Pt remains limited by poor activity tolerance and weakness. Continue PT POC as tolerated to bring her back to baseline. Continue to recommend IRF upon d/c.     Time Calculation:         PT Charges       Row Name 01/27/25 1128             Time Calculation    Start Time 1034   -ER      PT Received On 25  -ER      PT Goal Re-Cert Due Date 25  -ER         Timed Charges    90346 - Gait Training Minutes  12  -ER      88638 - PT Therapeutic Activity Minutes 20  -ER         Total Minutes    Timed Charges Total Minutes 32  -ER       Total Minutes 32  -ER                User Key  (r) = Recorded By, (t) = Taken By, (c) = Cosigned By      Initials Name Provider Type    ER Yolande Nassar, PT Physical Therapist                  Therapy Charges for Today       Code Description Service Date Service Provider Modifiers Qty    21168104015 HC GAIT TRAINING EA 15 MIN 2025 Yolande Nassar, PT GP 1    22346555391 HC PT THERAPEUTIC ACT EA 15 MIN 2025 Yolande Nassar, PT GP 1            PT G-Codes  Outcome Measure Options: AM-PAC 6 Clicks Basic Mobility (PT)  AM-PAC 6 Clicks Score (PT): 11  AM-PAC 6 Clicks Score (OT): 8  PT Discharge Summary  Anticipated Discharge Disposition (PT): inpatient rehabilitation facility    Yolande Nassar PT  2025      Electronically signed by Yolande Nassar PT at 25 1129          Occupational Therapy Notes (most recent note)        Pallavi Fletcher, OT at 25 0925          Patient Name: Natalia Arzate  : 1985    MRN: [id number]                              Today's Date: 2025       Admit Date: 2025    Visit Dx:     ICD-10-CM ICD-9-CM   1. Respiratory acidosis with metabolic acidosis  E87.4 276.3   2. Acute respiratory failure with hypoxia  J96.01 518.81   3. MARIAA (acute kidney injury)  N17.9 584.9   4. Pharyngeal dysphagia  R13.13 787.23     Patient Active Problem List   Diagnosis    Nephrolithiasis    Ovarian teratoma, right    Other chronic pain    Pelvic pain    History of DVT in adulthood    History of pulmonary embolism    Ureteral calculus    Ischemic cerebrovascular accident (CVA)    Primary hypertension    Left lumbar radiculopathy    PTSD (post-traumatic stress disorder)    MARIAA (acute kidney injury)    Anemia     Dyslipidemia    Hypothyroid    Other secondary gout, multiple sites    Factor 5 Leiden mutation, heterozygous    Vitamin D deficiency    Vitamin B 12 deficiency    Type 2 diabetes mellitus with hyperglycemia    Hoarseness, persistent    Ureterolithiasis    Acute cystitis without hematuria    Hepatic steatosis    Right flank pain, chronic    Detrusor instability of bladder    Frequency of micturition    Acute respiratory failure with hypercapnia    Hyperkalemia    Sepsis    Respiratory acidosis with metabolic acidosis    Acute respiratory failure    Diabetes mellitus type 2, insulin dependent    Diabetic peripheral neuropathy associated with type 2 diabetes mellitus     Past Medical History:   Diagnosis Date    A-fib     Abnormal ECG     Anemia     Anxiety     Asthma     Cancer     Ovarian    Depression     Diabetes mellitus     DVT (deep venous thrombosis)     Factor 5 Leiden mutation, heterozygous     Fibroid     GERD (gastroesophageal reflux disease)     Gout     H/O abdominal abscess     History of sepsis     History of transfusion     Hyperlipidemia     Hypothyroid     Kidney stone     Migraines     Neuropathy     Ovarian cancer 2021    Ovarian cyst     PE (pulmonary embolism)     Polycystic ovary syndrome     Preeclampsia     Rh incompatibility     Stroke     TIA (transient ischemic attack)     Urinary tract infection     Varicella      Past Surgical History:   Procedure Laterality Date    ABDOMINAL SURGERY      CARDIAC CATHETERIZATION       SECTION      CHOLECYSTECTOMY      COLONOSCOPY      ENDOSCOPY      EXTRACORPOREAL SHOCK WAVE LITHOTRIPSY (ESWL) Left 10/22/2021    Procedure: EXTRACORPOREAL SHOCKWAVE LITHOTRIPSY;  Surgeon: Milan Motley MD;  Location: [facility];  Service: Urology;  Laterality: Left;    HYSTERECTOMY  2017    ovarian ca    INSERT CENTRAL LINE AT BEDSIDE  2025    LITHOTRIPSY      NEPHRECTOMY Left 10/2022    RIGHT OOPHORECTOMY      URETEROSCOPY LASER LITHOTRIPSY WITH  STENT INSERTION Left 10/01/2021    Procedure: URETEROSCOPY WITH STENT PLACEMENT;  Surgeon: Milan Motley MD;  Location: [facility] OR;  Service: Urology;  Laterality: Left;    URETEROSCOPY LASER LITHOTRIPSY WITH STENT INSERTION Left 04/27/2022    Procedure: URETEROSCOPY STENT REMOVAL;  Surgeon: Milan Motley MD;  Location:  [facility] OR;  Service: Urology;  Laterality: Left;    URETEROSCOPY LASER LITHOTRIPSY WITH STENT INSERTION Left 06/29/2022    Procedure: CYSTOSCOPY RETROGRADE LEFT WITH STENT PLACEMENT;  Surgeon: Milan Motley MD;  Location: [facility] OR;  Service: Urology;  Laterality: Left;      General Information       Row Name 01/24/25 1103          General Information    Patient Profile Reviewed yes  -SELENA     Existing Precautions/Restrictions fall;oxygen therapy device and L/min;other (see comments)  Contact; NG; back wound; hx of CVA with L sided deficits  -SELENA       Row Name 01/24/25 1103          Cognition    Orientation Status (Cognition) oriented x 3  -SELENA       Row Name 01/24/25 1103          Safety Issues/Impairments Affecting Functional Mobility    Safety Issues Affecting Function (Mobility) insight into deficits/self-awareness;judgment;problem-solving;safety precaution awareness;sequencing abilities  -SELENA     Impairments Affecting Function (Mobility) balance;coordination;endurance/activity tolerance;grasp;motor control;motor planning;pain;postural/trunk control;range of motion (ROM);sensation/sensory awareness;shortness of breath;strength  -SELENA               User Key  (r) = Recorded By, (t) = Taken By, (c) = Cosigned By      Initials Name Provider Type    Pallavi Burr OT Occupational Therapist                     Mobility/ADL's       Row Name 01/24/25 1105          Bed Mobility    Bed Mobility rolling left;rolling right  -SELENA     Rolling Left [place] (Bed Mobility) dependent (less than 25% patient effort);2 person assist;verbal cues;nonverbal cues (demo/gesture)  -SELENA      Rolling Right [place] (Bed Mobility) maximum assist (25% patient effort);2 person assist;verbal cues;nonverbal cues (demo/gesture)  -     Bed Mobility, Safety Issues decreased use of arms for pushing/pulling;impaired trunk control for bed mobility;decreased use of legs for bridging/pushing  -     Assistive Device (Bed Mobility) bed rails;repositioning sheet  -       Row Name 01/24/25 1105          Transfers    Transfers bed-chair transfer  -       Row Name 01/24/25 1105          Bed-Chair Transfer    Bed-Chair [place] (Transfers) dependent (less than 25% patient effort);2 person assist  -     Assistive Device (Bed-Chair Transfers) lift device  -     Comment, (Bed-Chair Transfer) well-tolerated  -       Row Name 01/24/25 1105          Sit-Stand Transfer    Sit-Stand [place] (Transfers) not tested  -       Row Name 01/24/25 1105          Activities of Daily Living    BADL Assessment/Intervention lower body dressing;grooming  -       Row Name 01/24/25 1105          Lower Body Dressing Assessment/Training    [place] Level (Lower Body Dressing) don;socks;dependent (less than 25% patient effort)  -SELENA     Position (Lower Body Dressing) supine  -       Row Name 01/24/25 1105          Grooming Assessment/Training    [place] Level (Grooming) wash face, hands;dependent (less than 25% patient effort)  -SELENA     Position (Grooming) sitting up in bed  -               User Key  (r) = Recorded By, (t) = Taken By, (c) = Cosigned By      Initials Name Provider Type     Pallavi Fletcher OT Occupational Therapist                   Obj/Interventions       Row Name 01/24/25 1108          Hand (Therapeutic Exercise)    Hand (Therapeutic Exercise) strengthening exercise  -     Hand Strengthening (Therapeutic Exercise) left;right; strengthening;yellow;10 repetitions;other (see comments)  foam block  -       Row Name 01/24/25 1108          Motor Skills    Therapeutic Exercise hand  -        Row Name 01/24/25 1108          Balance    Balance Assessment sitting static balance  -SELENA     Static Sitting Balance contact guard  -SELENA     Dynamic Sitting Balance moderate assist  -SELENA     Position, Sitting Balance supported;sitting in chair  -SELENA     Balance Interventions sitting;dynamic reaching;core stability exercise;weight shifting activity  -SELNEA     Comment, Balance Pt participated in dynamic reach across midline and weight shifting activity to promote improved trunk control needed for functional transfers.  -SELENA               User Key  (r) = Recorded By, (t) = Taken By, (c) = Cosigned By      Initials Name Provider Type    Pallavi Burr, KORI Occupational Therapist                   Goals/Plan    No documentation.                  Clinical Impression       Row Name 01/24/25 1110          Pain Assessment    Pretreatment Pain Rating 8/10  -SELENA     Posttreatment Pain Rating 8/10  -SELENA     Pain Location back  -SELENA     Pain Management Interventions exercise or physical activity utilized;positioning techniques utilized  -SELENA     Response to Pain Interventions activity participation with tolerable pain  -SELENA     Pre/Posttreatment Pain Comment RN aware and managing  -SELENA       Row Name 01/24/25 1110          Plan of Care Review    Plan of Care Reviewed With patient;significant other  -SELENA     Progress improving  -SELENA     Outcome Evaluation Pt demonstrated improved activity tolerance this date, participated in trunk control and hand strengthening activities while seated in chair. Pt's progress toward ADL goals continues to be limited by significant weakness and pain. Pt motivated to work with therapy, gave good effort despite reported pain. Continue to progress per OT POC.  -SELENA       Row Name 01/24/25 1110          Therapy Plan Review/Discharge Plan (OT)    Anticipated Discharge Disposition (OT) inpatient rehabilitation facility  -SELENA       Row Name 01/24/25 1110          Vital Signs    O2 Delivery Pre Treatment nasal  cannula  -SELENA     O2 Delivery Intra Treatment nasal cannula  -SELENA     O2 Delivery Post Treatment nasal cannula  -SELENA     Pre Patient Position Supine  -SELENA     Intra Patient Position Side Lying  -SELENA     Post Patient Position Sitting  -SELENA       Row Name 01/24/25 1110          Positioning and Restraints    Pre-Treatment Position in bed  -SELENA     Post Treatment Position chair  -SELENA     In Chair reclined;call light within reach;encouraged to call for assist;exit alarm on;with family/caregiver;waffle cushion;on mechanical lift sling;legs elevated;with nsg;RUE elevated;LUE elevated  -SELENA               User Key  (r) = Recorded By, (t) = Taken By, (c) = Cosigned By      Initials Name Provider Type    Pallavi Burr OT Occupational Therapist                   Outcome Measures       Row Name 01/24/25 1114          How much help from another is currently needed...    Putting on and taking off regular lower body clothing? 1  -SELENA     Bathing (including washing, rinsing, and drying) 1  -SELENA     Toileting (which includes using toilet bed pan or urinal) 1  -SELENA     Putting on and taking off regular upper body clothing 2  -SELENA     Taking care of personal grooming (such as brushing teeth) 2  -SELENA     Eating meals 1  -SELENA     AM-PAC 6 Clicks Score (OT) 8  -SELENA       Row Name 01/24/25 1114          Functional Assessment    Outcome Measure Options AM-PAC 6 Clicks Daily Activity (OT)  -SELENA               User Key  (r) = Recorded By, (t) = Taken By, (c) = Cosigned By      Initials Name Provider Type    Pallavi Burr OT Occupational Therapist                    Occupational Therapy Education       Title: PT OT SLP Therapies (In Progress)       Topic: Occupational Therapy (In Progress)       Point: ADL training (Done)       Description:   Instruct learner(s) on proper safety adaptation and remediation techniques during self care or transfers.   Instruct in proper use of assistive devices.                  Learning Progress Summary            Patient  Acceptance, E,D, DU by SELENA at 1/24/2025 1115    Comment: OT POC; UE HEP; hand strengthening; self-repositioning    Acceptance, E, VU,NR by  at 1/19/2025 0933    Comment: role of therapy, ongoing treatment plan and discharge recommendations   Family Acceptance, E, VU,NR by  at 1/19/2025 0933    Comment: role of therapy, ongoing treatment plan and discharge recommendations   Significant Other Acceptance, E,D, DU by SELENA at 1/24/2025 1115    Comment: OT POC; UE HEP; hand strengthening; self-repositioning                      Point: Home exercise program (Done)       Description:   Instruct learner(s) on appropriate technique for monitoring, assisting and/or progressing therapeutic exercises/activities.                  Learning Progress Summary            Patient Acceptance, E,D, DU by SELENA at 1/24/2025 1115    Comment: OT POC; UE HEP; hand strengthening; self-repositioning   Significant Other Acceptance, E,D, DU by SELENA at 1/24/2025 1115    Comment: OT POC; UE HEP; hand strengthening; self-repositioning                      Point: Precautions (Not Started)       Description:   Instruct learner(s) on prescribed precautions during self-care and functional transfers.                  Learner Progress:  Not documented in this visit.              Point: Body mechanics (Done)       Description:   Instruct learner(s) on proper positioning and spine alignment during self-care, functional mobility activities and/or exercises.                  Learning Progress Summary            Patient Acceptance, E, VU,NR by  at 1/19/2025 0933    Comment: role of therapy, ongoing treatment plan and discharge recommendations   Family Acceptance, E, VU,NR by  at 1/19/2025 0933    Comment: role of therapy, ongoing treatment plan and discharge recommendations                                      User Key       Initials Effective Dates Name Provider Type Discipline     02/03/23 -  Sveta, Alpa GAO, OT Occupational Therapist OT    SELENA 06/16/21  -  Pallavi Fletcher OT Occupational Therapist OT                  OT Recommendation and Plan     Plan of Care Review  Plan of Care Reviewed With: patient, significant other  Progress: improving  Outcome Evaluation: Pt demonstrated improved activity tolerance this date, participated in trunk control and hand strengthening activities while seated in chair. Pt's progress toward ADL goals continues to be limited by significant weakness and pain. Pt motivated to work with therapy, gave good effort despite reported pain. Continue to progress per OT POC.     Time Calculation:         Time Calculation- OT       Row Name 01/24/25 0925             Time Calculation- OT    OT Start Time 0925  -SELENA      OT Received On 01/24/25  -SELENA         Timed Charges    32710 - OT Therapeutic Exercise Minutes 15  -SELENA      24999 - OT Self Care/Mgmt Minutes 8  -SELENA         Total Minutes    Timed Charges Total Minutes 23  -SELENA       Total Minutes 23  -SELENA                User Key  (r) = Recorded By, (t) = Taken By, (c) = Cosigned By      Initials Name Provider Type    Pallavi Burr OT Occupational Therapist                  Therapy Charges for Today       Code Description Service Date Service Provider Modifiers Qty    62230919125  OT THER PROC EA 15 MIN 1/24/2025 Pallavi Fletcher OT GO 1    72862109516  OT SELF CARE/MGMT/TRAIN EA 15 MIN 1/24/2025 Pallavi Fletcher OT GO 1                 Pallavi Fletcher OT  1/24/2025    Electronically signed by Pallavi Fletcher OT at 01/24/25 111

## 2025-01-27 NOTE — PLAN OF CARE
Goal Outcome Evaluation:  Plan of Care Reviewed With: patient        Progress: no change       Anticipated Discharge Disposition (SLP): inpatient rehabilitation facility          SLP Swallowing Diagnosis: mild, oral dysphagia, moderate, pharyngeal dysphagia (01/27/25 1300)

## 2025-01-28 LAB
GLUCOSE BLDC GLUCOMTR-MCNC: 230 MG/DL (ref 70–130)
GLUCOSE BLDC GLUCOMTR-MCNC: 258 MG/DL (ref 70–130)
GLUCOSE BLDC GLUCOMTR-MCNC: 264 MG/DL (ref 70–130)
GLUCOSE BLDC GLUCOMTR-MCNC: 302 MG/DL (ref 70–130)
UFH PPP CHRO-ACNC: 0.9 IU/ML

## 2025-01-28 PROCEDURE — 25010000002 ENOXAPARIN PER 10 MG: Performed by: INTERNAL MEDICINE

## 2025-01-28 PROCEDURE — 97597 DBRDMT OPN WND 1ST 20 CM/<: CPT

## 2025-01-28 PROCEDURE — 25010000002 HYDROMORPHONE PER 4 MG: Performed by: INTERNAL MEDICINE

## 2025-01-28 PROCEDURE — 25010000002 METOCLOPRAMIDE PER 10 MG: Performed by: PEDIATRICS

## 2025-01-28 PROCEDURE — 63710000001 INSULIN GLARGINE PER 5 UNITS: Performed by: PEDIATRICS

## 2025-01-28 PROCEDURE — 99232 SBSQ HOSP IP/OBS MODERATE 35: CPT | Performed by: NURSE PRACTITIONER

## 2025-01-28 PROCEDURE — 63710000001 INSULIN LISPRO (HUMAN) PER 5 UNITS

## 2025-01-28 PROCEDURE — 85520 HEPARIN ASSAY: CPT

## 2025-01-28 PROCEDURE — 82948 REAGENT STRIP/BLOOD GLUCOSE: CPT

## 2025-01-28 PROCEDURE — 92526 ORAL FUNCTION THERAPY: CPT

## 2025-01-28 RX ADMIN — ENOXAPARIN SODIUM 120 MG: 120 INJECTION SUBCUTANEOUS at 00:16

## 2025-01-28 RX ADMIN — METOCLOPRAMIDE HYDROCHLORIDE 10 MG: 10 INJECTION, SOLUTION INTRAMUSCULAR; INTRAVENOUS at 21:02

## 2025-01-28 RX ADMIN — NYSTATIN: 100000 POWDER TOPICAL at 20:59

## 2025-01-28 RX ADMIN — Medication 1 APPLICATION: at 08:27

## 2025-01-28 RX ADMIN — Medication 5 MG: at 20:59

## 2025-01-28 RX ADMIN — METOPROLOL SUCCINATE 50 MG: 50 TABLET, EXTENDED RELEASE ORAL at 08:26

## 2025-01-28 RX ADMIN — HYDROMORPHONE HYDROCHLORIDE 0.5 MG: 1 INJECTION, SOLUTION INTRAMUSCULAR; INTRAVENOUS; SUBCUTANEOUS at 04:16

## 2025-01-28 RX ADMIN — Medication 1 APPLICATION: at 20:59

## 2025-01-28 RX ADMIN — METOCLOPRAMIDE HYDROCHLORIDE 10 MG: 10 INJECTION, SOLUTION INTRAMUSCULAR; INTRAVENOUS at 04:16

## 2025-01-28 RX ADMIN — INSULIN LISPRO 8 UNITS: 100 INJECTION, SOLUTION INTRAVENOUS; SUBCUTANEOUS at 12:14

## 2025-01-28 RX ADMIN — METOCLOPRAMIDE HYDROCHLORIDE 10 MG: 10 INJECTION, SOLUTION INTRAMUSCULAR; INTRAVENOUS at 18:11

## 2025-01-28 RX ADMIN — OXYCODONE HYDROCHLORIDE 5 MG: 5 SOLUTION ORAL at 14:57

## 2025-01-28 RX ADMIN — METOCLOPRAMIDE HYDROCHLORIDE 10 MG: 10 INJECTION, SOLUTION INTRAMUSCULAR; INTRAVENOUS at 12:15

## 2025-01-28 RX ADMIN — HYDROMORPHONE HYDROCHLORIDE 0.5 MG: 1 INJECTION, SOLUTION INTRAMUSCULAR; INTRAVENOUS; SUBCUTANEOUS at 08:26

## 2025-01-28 RX ADMIN — INSULIN LISPRO 12 UNITS: 100 INJECTION, SOLUTION INTRAVENOUS; SUBCUTANEOUS at 18:11

## 2025-01-28 RX ADMIN — DULOXETINE HYDROCHLORIDE 30 MG: 30 CAPSULE, DELAYED RELEASE ORAL at 20:59

## 2025-01-28 RX ADMIN — INSULIN LISPRO 15 UNITS: 100 INJECTION, SOLUTION INTRAVENOUS; SUBCUTANEOUS at 12:14

## 2025-01-28 RX ADMIN — AVOBENZONE, HOMOSALATE, OCTISALATE, OCTOCRYLENE, AND OXYBENZONE 1 PACKET: 29.4; 147; 49; 25.4; 58.8 LOTION TOPICAL at 20:59

## 2025-01-28 RX ADMIN — ENOXAPARIN SODIUM 120 MG: 120 INJECTION SUBCUTANEOUS at 12:15

## 2025-01-28 RX ADMIN — CHOLESTYRAMINE 4 G: 4 POWDER, FOR SUSPENSION ORAL at 20:59

## 2025-01-28 RX ADMIN — CHOLESTYRAMINE 4 G: 4 POWDER, FOR SUSPENSION ORAL at 05:19

## 2025-01-28 RX ADMIN — GABAPENTIN 300 MG: 300 CAPSULE ORAL at 20:58

## 2025-01-28 RX ADMIN — AMITRIPTYLINE HYDROCHLORIDE 25 MG: 25 TABLET, FILM COATED ORAL at 20:59

## 2025-01-28 RX ADMIN — HYDROMORPHONE HYDROCHLORIDE 0.5 MG: 1 INJECTION, SOLUTION INTRAMUSCULAR; INTRAVENOUS; SUBCUTANEOUS at 16:21

## 2025-01-28 RX ADMIN — HYDROMORPHONE HYDROCHLORIDE 0.5 MG: 1 INJECTION, SOLUTION INTRAMUSCULAR; INTRAVENOUS; SUBCUTANEOUS at 00:16

## 2025-01-28 RX ADMIN — INSULIN LISPRO 12 UNITS: 100 INJECTION, SOLUTION INTRAVENOUS; SUBCUTANEOUS at 08:27

## 2025-01-28 RX ADMIN — HYDROMORPHONE HYDROCHLORIDE 0.5 MG: 1 INJECTION, SOLUTION INTRAMUSCULAR; INTRAVENOUS; SUBCUTANEOUS at 12:30

## 2025-01-28 RX ADMIN — HYDROMORPHONE HYDROCHLORIDE 0.5 MG: 1 INJECTION, SOLUTION INTRAMUSCULAR; INTRAVENOUS; SUBCUTANEOUS at 20:59

## 2025-01-28 RX ADMIN — OXYCODONE HYDROCHLORIDE 5 MG: 5 SOLUTION ORAL at 10:56

## 2025-01-28 RX ADMIN — INSULIN LISPRO 16 UNITS: 100 INJECTION, SOLUTION INTRAVENOUS; SUBCUTANEOUS at 21:00

## 2025-01-28 RX ADMIN — NYSTATIN: 100000 POWDER TOPICAL at 08:27

## 2025-01-28 RX ADMIN — HYDROXYZINE HYDROCHLORIDE 25 MG: 25 TABLET ORAL at 20:59

## 2025-01-28 RX ADMIN — INSULIN GLARGINE 35 UNITS: 100 INJECTION, SOLUTION SUBCUTANEOUS at 21:00

## 2025-01-28 RX ADMIN — INSULIN GLARGINE 35 UNITS: 100 INJECTION, SOLUTION SUBCUTANEOUS at 08:26

## 2025-01-28 RX ADMIN — Medication 1 CAPSULE: at 08:26

## 2025-01-28 RX ADMIN — AMLODIPINE BESYLATE 10 MG: 10 TABLET ORAL at 08:26

## 2025-01-28 RX ADMIN — CHOLESTYRAMINE 4 G: 4 POWDER, FOR SUSPENSION ORAL at 14:57

## 2025-01-28 RX ADMIN — GABAPENTIN 300 MG: 300 CAPSULE ORAL at 05:19

## 2025-01-28 RX ADMIN — METOPROLOL SUCCINATE 50 MG: 50 TABLET, EXTENDED RELEASE ORAL at 20:59

## 2025-01-28 RX ADMIN — AVOBENZONE, HOMOSALATE, OCTISALATE, OCTOCRYLENE, AND OXYBENZONE 1 PACKET: 29.4; 147; 49; 25.4; 58.8 LOTION TOPICAL at 08:28

## 2025-01-28 RX ADMIN — INSULIN LISPRO 15 UNITS: 100 INJECTION, SOLUTION INTRAVENOUS; SUBCUTANEOUS at 18:11

## 2025-01-28 RX ADMIN — DULOXETINE HYDROCHLORIDE 60 MG: 60 CAPSULE, DELAYED RELEASE ORAL at 08:26

## 2025-01-28 RX ADMIN — GABAPENTIN 300 MG: 300 CAPSULE ORAL at 14:57

## 2025-01-28 RX ADMIN — INSULIN LISPRO 15 UNITS: 100 INJECTION, SOLUTION INTRAVENOUS; SUBCUTANEOUS at 08:26

## 2025-01-28 NOTE — PLAN OF CARE
Goal Outcome Evaluation:              Outcome Evaluation: Patient is alert and oriented times four. VSS. RA with sats greater than 92%. Removed keofeed per hospitalist order. Patient received a diet order placed by SLP following their assessment. Up to chair with lift for an hour and a half. No complaints at this time. Care ongoing.

## 2025-01-28 NOTE — PROGRESS NOTES
Clinical Nutrition     Patient Name: Natalia Arzate  YOB: 1985  MRN: [id number]  Date of Encounter: 25 21:22 EST  Admission date: 2025  Reason for Visit: Follow-up protocol, EN    Assessment   Nutrition Assessment   Admission Diagnosis:  Acute respiratory failure [J96.00]    Problem List:    Acute respiratory failure with hypercapnia    History of DVT in adulthood    Primary hypertension    PTSD (post-traumatic stress disorder)    MARIAA (acute kidney injury)    Hypothyroid    Factor 5 Leiden mutation, heterozygous    Type 2 diabetes mellitus with hyperglycemia    Sepsis      PMH:   She  has a past medical history of A-fib, Abnormal ECG, Anemia, Anxiety, Asthma, Cancer, Depression, Diabetes mellitus, DVT (deep venous thrombosis), Factor 5 Leiden mutation, heterozygous, Fibroid, GERD (gastroesophageal reflux disease), Gout, H/O abdominal abscess, History of sepsis, History of transfusion, Hyperlipidemia, Hypothyroid, Kidney stone, Migraines, Neuropathy, Ovarian cancer (2021), Ovarian cyst, PE (pulmonary embolism), Polycystic ovary syndrome, Preeclampsia, Rh incompatibility, Stroke, TIA (transient ischemic attack), Urinary tract infection, and Varicella.    PSH:  She  has a past surgical history that includes  section; Cholecystectomy; Colonoscopy; Cardiac catheterization; Right oophorectomy; Abdominal surgery; Esophagogastroduodenoscopy; ureteroscopy laser lithotripsy with stent insertion (Left, 10/01/2021); Extracorporeal shock wave lithotripsy (Left, 10/22/2021); Lithotripsy; ureteroscopy laser lithotripsy with stent insertion (Left, 2022); ureteroscopy laser lithotripsy with stent insertion (Left, 2022); Nephrectomy (Left, 10/2022); Hysterectomy (2017); and Insert Central Line At Bedside (2025).    Substance history: Opioids    Applicable Nutrition History:   (25) ARF/VENT   (25) CRRT 24---stopped 1-10-25  (25) HD done  once  (1/17/25) Extubated   (1/23) FEES: SLP Diet Recommendation: NPO, temporary alternate methods of nutrition/hydration, other (see comments) (x 4oz puree snack, 3x/day, as well as 6-8 ice chips or 1/4 tsp H2O/hour.  (1/27)  FEES SLP rec Soft to Chew w Chopped Meats No Mixed Consistencies Honey Thick Liquid    SKIN: L flank -wound, sacrum/coccyx- areas of friction damage, area purple slow to becki    Labs    Labs Reviewed: Yes    Results from last 7 days   Lab Units 01/27/25  0554 01/26/25  0224 01/25/25  0627 01/24/25  1603 01/23/25  0453 01/22/25  1201   GLUCOSE mg/dL 235* 241*  --  231*  --  215*   BUN mg/dL 36* 28*  --  22*  --  13   CREATININE mg/dL 0.63 0.58  --  0.52*  --  0.54*   SODIUM mmol/L 131* 126*  --  132*  --  131*   CHLORIDE mmol/L 91* 86*  --  90*  --  90*   POTASSIUM mmol/L 4.7 4.1  --  4.5  --  4.0   PHOSPHORUS mg/dL  --  4.5  --  5.0*  --  4.2   MAGNESIUM mg/dL 1.6 1.2* 1.8 1.2*   < > 1.0*   ALT (SGPT) U/L  --  38*  --  35*  --   --     < > = values in this interval not displayed.       Results from last 7 days   Lab Units 01/26/25  0224 01/24/25  1603   ALBUMIN g/dL 4.1 3.9   CRP mg/dL 3.58* 2.18*   CHOLESTEROL mg/dL  --  264*   TRIGLYCERIDES mg/dL  --  739*       Results from last 7 days   Lab Units 01/27/25  2018 01/27/25  1735 01/27/25  1256 01/27/25  0538 01/27/25  0007 01/26/25  1643   GLUCOSE mg/dL 229* 210* 205* 246* 199* 272*     Lab Results   Lab Value Date/Time    HGBA1C 9.10 (H) 01/01/2025 2133    HGBA1C 9.6 (H) 12/16/2024 1154    HGBA1C 9.9 (H) 10/15/2024 0505                   Medications    Medications Reviewed: yes    Scheduled Meds:amitriptyline, 25 mg, Oral, Nightly  [START ON 1/28/2025] amLODIPine, 10 mg, Oral, Daily  castor oil-balsam peru, 1 Application, Topical, Q12H  cholestyramine light, 1 packet, Oral, Q8H  DULoxetine, 30 mg, Oral, Nightly  DULoxetine, 60 mg, Oral, Daily  enoxaparin, 120 mg, Subcutaneous, Q12H  gabapentin, 300 mg, Oral, Q8H  hydrOXYzine, 25 mg, Oral,  "Nightly  insulin glargine, 35 Units, Subcutaneous, Q12H  [START ON 1/28/2025] Insulin Lispro, 15 Units, Subcutaneous, TID With Meals  insulin lispro, 4-24 Units, Subcutaneous, 4x Daily AC & at Bedtime  [START ON 1/28/2025] lactobacillus acidophilus, 1 capsule, Oral, Daily  metoclopramide, 10 mg, Intravenous, Q6H  metoprolol succinate XL, 50 mg, Oral, BID  Nutrisource fiber, 1 packet, Per G Tube, TID  nystatin, , Topical, Q12H  Psyllium, 1 packet, Oral, BID  [Held by provider] torsemide, 40 mg, Oral, Daily      Continuous Infusions:     PRN Meds:.  acetaminophen    dextrose    dextrose    glucagon (human recombinant)    HYDROcodone-acetaminophen    HYDROmorphone    ipratropium    ipratropium-albuterol    loperamide    Magnesium Standard Dose Replacement - Follow Nurse / BPA Driven Protocol    meclizine    melatonin    midazolam    ondansetron    oxyCODONE    Polyvinyl Alcohol-Povidone PF    Potassium Replacement - Follow Nurse / BPA Driven Protocol    sodium chloride    sodium chloride    Intake/Ouptut 24 hrs (0701 - 0700)   I&O's Reviewed: yes    Intake & Output (last day)         01/27 0701  01/28 0700    Other     NG/GT     Total Intake(mL/kg)     Urine (mL/kg/hr)     Total Output     Net               BM: not recorded 1/26, x 3 on 1/25, x 4 on 1/24, x 10 on 1/23     Anthropometrics     Height: Height: 162.6 cm (64\")64in  Last Filed Weight: Weight: (!) 159 kg (351 lb 3.2 oz) (01/27/25 0406)350lb  Method: Weight Method: Bed scale  BMI: BMI (Calculated): 60.360    UBW: last MD office weight 336lb    Weight change:  ? 27lb wt gain since January    Nutrition Focused Physical Exam     Date:     Unable to perform due to Pt unable to participate at time of visit     Subjective   Reported/Observed/Food/Nutrition Related History:     1/27  EN infusing at goal at time of visit. APRN rpt cont issue w diarrhea. Note later in day, FEES w diet advance form 1 pureed item 3x/da .    1/23  She is tolerating @ goal rate. Tube " clogged again and has been on hold since 10am.      Patient reports she gets nausea when she drinks milk at home.  Food that has dairy gives her diarrhea. She is not sure if she had lactose intolerance. She noticed change in her food tolerance after she had her gallbalder removed in 2017    RN reports patient now having frequent BM.  Could be exacerbated by Reglan     FEES completed with recs for NPO status.      1/22  Pt up in chair, notes some nausea. Spoke w/RN, states pt started on reglan. RN does not suspect pt nausea is TF related as pt also reporting to RN that she has vertigo. RN notes keofeed clogged earlier, suspects r/t medication vs water flush.     1/21  Pt resting in bed, states she is tolerating current TF regimen. Pt had FEES, recommending NPO. RN states pt started on reglan today and compazine dc'd.     1-20: per RN: pt had nausea vomiting over weekend, TF decreased to trophic rate pt tolerating, plan for FEES today  Keofeed no longer in small bowel has migrated back into stomach per last KUB  Pt resting in bed, on nasal cannula, is alert, oriented, states she has vertigo, and get sick when she is rolled in the bed    1-16: pt intubated, sedated   + fentanyl, precedex, heparin, insulin  Per RN: pt tolerating TF, had small bm last night    1-14-25: pt intubated, sedated, fiancee at bedside  + fentanyl, precedex, insulin  Per RN: TF not restarted yesterday, unclear why, plan for bronch today  Plan to resume TF s/p Bronch    1-10: pt intubated sedated, remains on CRRT  + fentanyl, heparin  Per RN: pt has been off pressors since last night, is less puffy, able to tolerate turning, has not had a bm, had good bowel sound  Per MD: ok to resume TF    1-8: pt intubated, sedated + fentanyl, versed,levophed 0.06mcg, bicarb @75ml/hr  Per RN: pt had 800ml GRV last night TF on hold, is super acidotic, line placed for dialysis, plan to start CRRT     1-6: pt intubated, sedated, fiancee at bedside  + fentanyl,  "versed, insulin  Per RN:pt s/p emergent bronch Saturday,  trophic feed started yesterday  Per MD ok to advance TF    1-3: Pt intubated, sedated, fiancee at bedside  + insulin, fentanyl, versed, heparin, amio  Per RN: pt had wide complex rhythm last night, lokelma on hold as K+ wnl, 103 temp  Per MD ok to start TF,  place keofeed    Needs Assessment   Date:1/21/25    Height used:Height: 162.6 cm (64\")64in  Weights used/ ABW: 336lb/ 153kg   IBW: 120lb/ 55kg    Estimated Calorie needs: ~1500kcal  Method:  22-25Kcals/KG IBW: 1210-1375kcal  Method:  MSJ ABW: 2195kcal    Estimated Protein needs: ~ 138g protein  Method: 2.5g protein per kg IBW: 138g protein  Method: 0.8-1g protein per kg ABW: 122-153g protein    Current Nutrition Prescription     PO: Diet: Diabetic, Cardiac; Healthy Heart (2-3 Na+); Consistent Carbohydrate; No Mixed Consistencies, Feeding Assistance - Nursing; Texture: Soft to Chew (NDD 3); Soft to Chew: Chopped Meat; Fluid Consistency: Honey Thick  Oral Nutrition Supplement:  Intake: Insufficient data    EN: Peptamen Intense VHP  Goal Rate: 70ml  Water Flushes: 25ml  Modular: None  Route: NG   (keofeed now in stomach per KUB 1-18-25)  Tube: Small bore    At goal over: 22Hrs/day     Rx will supply:   Goal Volume 1540  mL/day     Flush Volume 550 mL/day     Energy 1540 Kcal/day 103 % Est Need   Protein 142 g/day 103 % Est Need   Fiber 6.2      Water in  EN 1294 mL     Total Water 1844 mL     Meet DRI Yes        --------------------------------------------------------------------------  Product/Rate verified at bedside: Yes  Infusing Rate at time of visit: Peptamen VHP 70 ml/hr Water at 25 ml/hr     Average Delivery from Charting:  unable to evaluate - no recording 1/24-25  Recording o 1920 ml delivery on 1/26 - unclear timeframe of delivery.   Volume  mL/day   % Goal Vol.   Flush Volume  mL/day     Energy  Kcal/day  % Est Need   Protein  g/day  % Est Need   Fiber  g/day     Water in  EN  mL     Total Water  " mL     Meet DRI No           Assessment & Plan   Nutrition Diagnosis   Date: 1-3-25  Updated: 1-20-25, 1/27  Problem Inadequate energy intake    Etiology EN apparently supplied   Signs/Symptoms Per documentation 1/22-1/26   Status:  Unk  w inadequate documentation    Date:  1/22 Updated:1/23, 1/27  Problem Inadequate oral intake   Etiology Dysphagia    Signs/Symptoms Oral diet limited to single item/da as of 1/23 Note now adv to texture modified diet    Status: Active    Goal:   Nutrition to support treatment, Establish PO, and Adjust EN as appropriate       Nutrition Intervention      Follow treatment progress, Care plan reviewed, Nutrition support order placed, EN rec prepared if need decr hrs administration.     W cont c/o diarrhea added Fiber for current EN rx:  Peptamen VHP 70 ml/hr Water at 25 ml/hr 3 pkt Fiber/da to supply 1540 ml for  1585 Kcal 106% est need, 142 g % est need, 15.2 g Fiber, 1294 ml Water in EN 1844 total ml Free Water.     IF po performance adv; may consider nocturnal EN:  Peptamen VHP 75 ml/hr Water at 30 ml ev 2 hr over 12 hr 6P-6A 2 pkt Fiber/da 1 pkt Prosource TF/da for 900 ml to supply 970 Kcal 65% est need, 94 g PRO 68% est need, 9 g Fiber, 756 ml Water in  total ml Free Water.       If nausea continues, would recommend advance keofeed post-pylorus    Monitoring/Evaluation:   Per protocol, I&O, Pertinent labs, Weight, Skin status, GI status, Symptoms    Ritika Mendez RD  Time Spent: 35 min

## 2025-01-28 NOTE — PROGRESS NOTES
"    [facility] Medicine Services  PROGRESS NOTE    Patient Name: Natalia Arzate  : 1985  MRN: [id number]    Date of Admission: 2025  Primary Care Physician: Bladimir Calle PA-C    Subjective   Subjective     CC:  Resp failure    HPI:  She was seen resting up in bed awake and alert.  Staff at bedside.  Fiancée at bedside.  Her Keofeed was discontinued yesterday and she has been placed on modified diet.  Tolerating.  Rectal tube \"fell out\" this morning.  Using the bedpan now.  Stool starting to thicken a little today since tube feed discontinued.    Objective   Objective     Vital Signs:   Temp:  [97 °F (36.1 °C)-98.4 °F (36.9 °C)] 97.6°F (36.9 °C)  Heart Rate:  [] 91  Resp:  [18] 18  BP: (127-132)/() 127/81     Physical Exam:  General: Awake and alert.  Resting back in bed.  Chronically ill looking, no acute distress, conversant and cooperative.  Fiancée at bedside.  HEENT: NCAT, no gross abnormalities  Lungs: Clear to auscultation bilaterally but generally distant and decreased at bases, likely due to body habitus, no wheezing or crackles.  Heart: RRR, normal S1 and S2, no murmur, no gallop  Abdomen: Soft, no tenderness, normal bowel sounds, nondistended.  Morbidly obese.    Extremities: pulses equal bilaterally.  ROBISON spontaneously.  Skin: No bleeding, bruising or rash, normal skin turgor and elasticity.  Left IJ TLDL in place with dressing dry and intact.  Neurologic: Awake and alert.  Oriented x 3.  Cranial nerves appear intact with no evidence of facial asymmetry, normal motor and sensory functions in all 4 extremities.  Speech clear and appropriate.  Psych: normal mood, calm and cooperative       Results Reviewed:  LAB RESULTS:      Lab 25  0554 25  0702 25  0552 25  0224 25  1603 25  1201   WBC 5.58  --   --  6.05  --  3.90   HEMOGLOBIN 9.0*  --   --  9.7*  --  9.8*   HEMATOCRIT 27.6*  --   --  30.1*  --  31.2* "   PLATELETS 167  --   --  188  --  195   NEUTROS ABS 3.18  --   --  3.57  --  2.43   IMMATURE GRANS (ABS) 0.02  --   --  0.01  --  0.04   LYMPHS ABS 1.47  --   --  1.48  --  0.53*   MONOS ABS 0.52  --   --  0.57  --  0.65   EOS ABS 0.33  --   --  0.38  --  0.22   MCV 90.2  --   --  90.7  --  92.6   CRP  --   --   --  3.58* 2.18*  --    HSTROP T  --  36* 34*  --   --   --          Lab 01/27/25  0554 01/26/25  0224 01/25/25  0627 01/24/25  1603 01/23/25  0453 01/22/25  1201   SODIUM 131* 126*  --  132*  --  131*   POTASSIUM 4.7 4.1  --  4.5  --  4.0   CHLORIDE 91* 86*  --  90*  --  90*   CO2 24.0 27.0  --  28.0  --  29.0   ANION GAP 16.0* 13.0  --  14.0  --  12.0   BUN 36* 28*  --  22*  --  13   CREATININE 0.63 0.58  --  0.52*  --  0.54*   EGFR 116.6 119.0  --  122.1  --  121.0   GLUCOSE 235* 241*  --  231*  --  215*   CALCIUM 10.8* 10.8*  --  10.4  --  9.9   MAGNESIUM 1.6 1.2* 1.8 1.2* 2.3 1.0*   PHOSPHORUS  --  4.5  --  5.0*  --  4.2         Lab 01/26/25 0224 01/24/25  1603   TOTAL PROTEIN 8.1 8.1   ALBUMIN 4.1 3.9   GLOBULIN 4.0 4.2   ALT (SGPT) 38* 35*   AST (SGOT) 64* 70*   BILIRUBIN 0.6 0.5   ALK PHOS 140* 134*         Lab 01/26/25  0702 01/26/25  0552   HSTROP T 36* 34*         Lab 01/24/25  1603   CHOLESTEROL 264*   TRIGLYCERIDES 739*                 Brief Urine Lab Results  (Last result in the past 365 days)        Color   Clarity   Blood   Leuk Est   Nitrite   Protein   CREAT   Urine HCG        01/02/25 1213             52.3         01/02/25 1213 Yellow   Cloudy   Moderate (2+)   Negative   Negative   100 mg/dL (2+)                   Microbiology Results Abnormal       Procedure Component Value - Date/Time    Respiratory Culture - Wash, Lung, Left Lower Lobe [[id number]]  (Abnormal)  (Susceptibility) Collected: 01/14/25 1608    Lab Status: Final result Specimen: Wash from Lung, Left Lower Lobe Updated: 01/17/25 1221     Respiratory Culture Light growth (2+) Klebsiella pneumoniae ESBL     Comment:   Consider  infectious disease consult.  Susceptibility results may not correlate to clinical outcomes.         Light growth (2+) Staphylococcus aureus, MRSA     Comment:   Considering site of infection and appropriate dosing, oxacillin (or cefoxitin) results can be applied to cefazolin, nafcillin, ampicillin/sulbactam, dicloxacillin, amoxicillin/clavulanate, cephalexin, and cefpodoxime.  Methicillin resistant Staphylococcus aureus, Patient may be an isolation risk.         Rare growth Normal Respiratory Margo     Gram Stain Many (4+) WBCs per low power field      Rare (1+) Epithelial cells per low power field      Rare (1+) Gram positive cocci in pairs and clusters    Susceptibility        Klebsiella pneumoniae ESBL      KELSIE      Ciprofloxacin Resistant      Ertapenem Susceptible      Levofloxacin Resistant      Meropenem Susceptible      Tetracycline Resistant      Trimethoprim + Sulfamethoxazole Resistant                       Susceptibility        Staphylococcus aureus, MRSA      KELSIE      Clindamycin Resistant      Linezolid Susceptible      Oxacillin Resistant      Tetracycline Susceptible      Trimethoprim + Sulfamethoxazole Susceptible      Vancomycin Susceptible                       Susceptibility Comments       Klebsiella pneumoniae ESBL    With the exception of urinary-sourced infections, aminoglycosides should not be used as monotherapy.      Staphylococcus aureus, MRSA    This isolate is presumed to be clindamycin resistant based on detection of inducible clindamycin resistance.  Clindamycin may still be effective in some patients.  This isolate is presumed to be clindamycin resistant based on detection of inducible clindamycin resistance.  Clindamycin may still be effective in some patients.               Respiratory Culture - Sputum, ET Suction [[id number]]  (Abnormal) Collected: 01/14/25 2577    Lab Status: Final result Specimen: Sputum from ET Suction Updated: 01/17/25 1221     Respiratory Culture Light growth  (2+) Klebsiella pneumoniae ESBL     Comment:   Consider infectious disease consult.  Susceptibility results may not correlate to clinical outcomes.         Light growth (2+) Staphylococcus aureus, MRSA     Comment:   Considering site of infection and appropriate dosing, oxacillin (or cefoxitin) results can be applied to cefazolin, nafcillin, ampicillin/sulbactam, dicloxacillin, amoxicillin/clavulanate, cephalexin, and cefpodoxime.  Methicillin resistant Staphylococcus aureus, Patient may be an isolation risk.        Gram Stain Many (4+) WBCs per low power field      Rare (1+) Epithelial cells per low power field      Few (2+) Gram positive cocci in pairs, chains and clusters    Narrative:      Refer to LLL Wash culture for MICS.     Blood Culture - Blood, Blood, Arterial Line [[id number]]  (Abnormal) Collected: 01/14/25 1434    Lab Status: Final result Specimen: Blood, Arterial Line Updated: 01/17/25 0636     Blood Culture Staphylococcus, coagulase negative     Isolated from Anaerobic Bottle     Gram Stain Anaerobic Bottle Gram positive cocci in pairs and clusters    Narrative:      Probable contaminant requires clinical correlation, susceptibility not performed unless requested by physician.      Blood Culture ID, PCR - Blood, Blood, Arterial Line [[id number]]  (Abnormal) Collected: 01/14/25 1434    Lab Status: Final result Specimen: Blood, Arterial Line Updated: 01/16/25 0657     BCID, PCR Staph spp, not aureus or lugdunensis. Identification by BCID2 PCR.     BOTTLE TYPE Anaerobic Bottle    Blood Culture - Blood, Arm, Left [[id number]]  (Abnormal) Collected: 01/05/25 1305    Lab Status: Final result Specimen: Blood from Arm, Left Updated: 01/07/25 1100     Blood Culture Staphylococcus, coagulase negative     Isolated from Anaerobic Bottle     Gram Stain Anaerobic Bottle Gram positive cocci in clusters    Narrative:      Less than seven (7) mL's of blood was collected.  Insufficient quantity may yield false negative  results.  Probable contaminant requires clinical correlation, susceptibility not performed unless requested by physician.    Blood Culture ID, PCR - Blood, Arm, Left [[id number]]  (Abnormal) Collected: 01/05/25 1305    Lab Status: Final result Specimen: Blood from Arm, Left Updated: 01/06/25 1435     BCID, PCR Staph spp, not aureus or lugdunensis. Identification by BCID2 PCR.     BOTTLE TYPE Anaerobic Bottle    Respiratory Culture - Bronchial Wash, Lung, Left Lower Lobe [[id number]]  (Abnormal)  (Susceptibility) Collected: 01/04/25 0755    Lab Status: Final result Specimen: Bronchial Wash from Lung, Left Lower Lobe Updated: 01/06/25 0908     Respiratory Culture Scant growth (1+) Staphylococcus aureus, MRSA     Comment:   Methicillin resistant Staphylococcus aureus, Patient may be an isolation risk.         No Normal Respiratory Margo     Gram Stain Moderate (3+) WBCs seen      Rare (1+) Gram positive cocci in pairs and clusters    Susceptibility        Staphylococcus aureus, MRSA      KELSIE      Clindamycin Resistant      Linezolid Susceptible      Oxacillin Resistant      Tetracycline Susceptible      Trimethoprim + Sulfamethoxazole Susceptible      Vancomycin Susceptible                       Susceptibility Comments       Staphylococcus aureus, MRSA    This isolate is presumed to be clindamycin resistant based on detection of inducible clindamycin resistance.  Clindamycin may still be effective in some patients.  This isolate is presumed to be clindamycin resistant based on detection of inducible clindamycin resistance.  Clindamycin may still be effective in some patients.               Respiratory Culture - Sputum, ET Suction [[id number]]  (Abnormal)  (Susceptibility) Collected: 01/02/25 1212    Lab Status: Final result Specimen: Sputum from ET Suction Updated: 01/04/25 0939     Respiratory Culture Moderate growth (3+) Staphylococcus aureus, MRSA      No Normal Respiratory Margo     Gram Stain Many (4+) WBCs per low  power field      Rare (1+) Epithelial cells per low power field      Many (4+) Gram positive cocci in pairs and clusters    Susceptibility        Staphylococcus aureus, MRSA      KELSIE      Clindamycin Resistant      Linezolid Susceptible      Oxacillin Resistant      Tetracycline Susceptible      Trimethoprim + Sulfamethoxazole Susceptible      Vancomycin Susceptible                       Susceptibility Comments       Staphylococcus aureus, MRSA    This isolate is presumed to be clindamycin resistant based on detection of inducible clindamycin resistance.  Clindamycin may still be effective in some patients.               MRSA Screen, PCR (Inpatient) - Swab, Nares [[id number]]  (Abnormal) Collected: 01/02/25 1246    Lab Status: Final result Specimen: Swab from Nares Updated: 01/02/25 1504     MRSA PCR Positive    Narrative:      The negative predictive value of this diagnostic test is high and should only be used to consider de-escalating anti-MRSA therapy. A positive result may indicate colonization with MRSA and must be correlated clinically.            CT Upper Extremity Right With Contrast    Result Date: 1/27/2025  CT UPPER EXTREMITY RIGHT W CONTRAST Date of Exam: 1/27/2025 11:48 AM EST Indication: pain in R shoulder and R upper arm. Comparison: Humerus radiographs 1/23/2025 Technique: Axial CT images were obtained of the right upper extremity after the uneventful intravenous administration of 95 mL Isovue-300.  Reconstructed coronal and sagittal images were also obtained. Automated exposure control and iterative construction methods were used. Findings: Bones: Normal acromioclavicular and glenohumeral alignment. No evidence of fracture. No osseous erosions. No substantial degenerative changes. Soft tissues: No soft tissue mass or fluid collection. Visualized muscles appear intact. Partially visualized nasogastric tube. Central venous catheter partially visualized. Visualized lung is clear.     Impression:  Impression: No evidence of acute osseous or soft tissue abnormality. Electronically Signed: Jacob Alvarado MD  1/27/2025 3:37 PM EST  Workstation ID: [id number]    SLP FEES - Fiberoptic Endo Eval Swallow    Result Date: 1/27/2025  This procedure was auto-finalized with no dictation required.     Results for orders placed during the hospital encounter of 01/02/25    Adult Transthoracic Echo Complete W/ Cont if Necessary Per Protocol    Interpretation Summary    Left ventricular systolic function is normal. Calculated left ventricular EF = 59.3% Left ventricular ejection fraction appears to be 61 - 65%.    Left ventricular wall thickness is consistent with borderline concentric hypertrophy.    Left ventricular diastolic function was normal.    Mild aortic valve stenosis is present.    Peak velocity of the flow distal to the aortic valve is 292 cm/s. Aortic valve mean pressure gradient is 20 mmHg.    Estimated right ventricular systolic pressure from tricuspid regurgitation is normal (<35 mmHg).      Current medications:  Scheduled Meds:amitriptyline, 25 mg, Oral, Nightly  amLODIPine, 10 mg, Oral, Daily  castor oil-balsam peru, 1 Application, Topical, Q12H  cholestyramine light, 1 packet, Oral, Q8H  DULoxetine, 30 mg, Oral, Nightly  DULoxetine, 60 mg, Oral, Daily  enoxaparin, 120 mg, Subcutaneous, Q12H  gabapentin, 300 mg, Oral, Q8H  hydrOXYzine, 25 mg, Oral, Nightly  insulin glargine, 35 Units, Subcutaneous, Q12H  Insulin Lispro, 15 Units, Subcutaneous, TID With Meals  insulin lispro, 4-24 Units, Subcutaneous, 4x Daily AC & at Bedtime  lactobacillus acidophilus, 1 capsule, Oral, Daily  metoclopramide, 10 mg, Intravenous, Q6H  metoprolol succinate XL, 50 mg, Oral, BID  Nutrisource fiber, 1 packet, Per G Tube, TID  nystatin, , Topical, Q12H  Psyllium, 1 packet, Oral, BID  [Held by provider] torsemide, 40 mg, Oral, Daily      Continuous Infusions:   PRN Meds:.  acetaminophen    dextrose    dextrose    glucagon (human  recombinant)    HYDROcodone-acetaminophen    HYDROmorphone    ipratropium    ipratropium-albuterol    loperamide    Magnesium Standard Dose Replacement - Follow Nurse / BPA Driven Protocol    meclizine    melatonin    midazolam    ondansetron    oxyCODONE    Polyvinyl Alcohol-Povidone PF    Potassium Replacement - Follow Nurse / BPA Driven Protocol    sodium chloride    sodium chloride    Assessment & Plan   Assessment & Plan     Active Hospital Problems    Diagnosis  POA    **Acute respiratory failure with hypercapnia [J96.02]  Yes    Sepsis [A41.9]  Yes    Type 2 diabetes mellitus with hyperglycemia [E11.65]  Yes    Hypothyroid [E03.9]  Yes    Factor 5 Leiden mutation, heterozygous [D68.51]  Yes    MARIAA (acute kidney injury) [N17.9]  Yes    Primary hypertension [I10]  Yes    PTSD (post-traumatic stress disorder) [F43.10]  Yes    History of DVT in adulthood [Z86.718]  Not Applicable      Resolved Hospital Problems   No resolved problems to display.        Brief Hospital Course to date:  Natalia Arzate is a 39 y.o. female with a history of HLD, Hypothyroidism, Afib, PE/DVT, Factor V Leiden mutation, and stroke who presented to [facility] with altered mental status. Labs there were significant for renal failure, hyperglycemia, and hypercapnic respiratory failure. Dx with  pneumonia and resp failure.  She was intubated and required the initiation of vasopressors. She was transferred to [facility] on 1/2/25 for ongoing care.    This patient's problems and plans were partially entered by my partner and updated as appropriate by me 01/28/25.    Assessment/plan:    Acute hypoxic resp failure, improving  Severe sepsis with end organ failure- renal failure and resp failure, improved  MRSA PNA--s/p vanc/linezolid  ESBL Klebsiella PNA  S/p intubation, extubated from 1/1/2025 till 1/17/2025  Pt underwent bronch on 1/14 with significant mucus plugging, post CXR with improvement in L lung aeration.  S/P merrem, vancomycin and linezolid  for ESBL pneumonia and MRSA pneumonia respectively  Currently on room air--cont pulmonary toilet, oxygen as needed.    Gemella sanguinis Bacteremia  1 bottle Gemella sanguinis--unclear significance  S/p 10 days amp/unasyn.    Hypoosmolar hyponatremia  Secondary to ongoing diuretic therapy with Bumex and diarrhea  Holding Bumex, getting IV albumin and monitor BMP   Sodium improved today.  Monitor.    MARIAA due to severe sepsis  S/P Lt nephrectomy in 2022  Pt required CRRT ~ 1/8/25-1/11/25   Renal function improving, no need for further HD  Monitor labs    Severe dysphagia  Keofeed discontinued yesterday.  Patient placed on modified diet.    Continue Zofran, reglan.  Trial of meclizine, as having some vertigo sx. slight improvement reported  Speech therapy following a modified diet.    Right arm pain  X-rays to forearm and humerus neg for fracture  PRN dilaudid IV  Pt is not able to fit in MRI  Doppler ultrasound right upper extremity with no evidence of DVT    Diarrhea  C diff prior neg  Continue probiotic  Likely 2/2 abx and tube feeds  Continue Imodium prn  Prior added cholestyramine and metamucil  Labs unable to recheck C. difficile due to persistent diarrhea with tube feeds on board.  Dietitian following.  Ultimately tube feeds discontinued yesterday.  Monitor on modified diet.  MD spoke with Dr. Boyd with ID.  Does not recommend starting oral vancomycin at this time.  Stool seems to be becoming more consistent today with tube feed off and rectal tube out.  Monitor.    Factor V Leiden mutation  History of PE/DVT  History of stroke  Continue home lovenox    DM  A1c 9.1%  Continue Lantus and regular insulin while on tube feeds.  Glucose running still.  Now transition to oral diet with tube feeds and Keofeed discontinued.  Changed ACHS Accu-Cheks..  Changed to basal bolus with Humalog and Lantus.  Also on high-dose Humalog SSI.  If no improvement in glucoses by this evening will need to increase regimen further        Hypertension  History A-fib  Prior restarted home metoprolol, norvasc  Continue lovenox    History of PTSD  Continue home cymbalta and elavil    Expected Discharge Location and Transportation: To be determined  Expected Discharge   Expected Discharge Date: 1/30/2025; Expected Discharge Time:      VTE Prophylaxis:  Pharmacologic & mechanical VTE prophylaxis orders are present.         AM-PAC 6 Clicks Score (PT): 12 (01/28/25 0830)    CODE STATUS:   Code Status and Medical Interventions: CPR (Attempt to Resuscitate); Full Support   Ordered at: 01/02/25 1952     Code Status (Patient has no pulse and is not breathing):    CPR (Attempt to Resuscitate)     Medical Interventions (Patient has pulse or is breathing):    Full Support       Lynne Neff, APRN  01/28/25

## 2025-01-28 NOTE — PLAN OF CARE
Goal Outcome Evaluation:  Plan of Care Reviewed With: patient           Outcome Evaluation: L lat flank wound stable with no significant change noted. PT was able to debride a small amount of nonviable slough.  no significant change noted with MIST therapy and with heavy slough covering PT will hold MIST and recheck in 2-3 days.

## 2025-01-28 NOTE — THERAPY WOUND CARE TREATMENT
Acute Care - Wound/Debridement Treatment Note  [facility]     Patient Name: Natalia Arzate  : 1985  MRN: [id number]  Today's Date: 2025                Admit Date: 2025    Visit Dx:    ICD-10-CM ICD-9-CM   1. Respiratory acidosis with metabolic acidosis  E87.4 276.3   2. Acute respiratory failure with hypoxia  J96.01 518.81   3. MARIAA (acute kidney injury)  N17.9 584.9   4. Pharyngeal dysphagia  R13.13 787.23       Patient Active Problem List   Diagnosis    Nephrolithiasis    Ovarian teratoma, right    Other chronic pain    Pelvic pain    History of DVT in adulthood    History of pulmonary embolism    Ureteral calculus    Ischemic cerebrovascular accident (CVA)    Primary hypertension    Left lumbar radiculopathy    PTSD (post-traumatic stress disorder)    MARIAA (acute kidney injury)    Anemia    Dyslipidemia    Hypothyroid    Other secondary gout, multiple sites    Factor 5 Leiden mutation, heterozygous    Vitamin D deficiency    Vitamin B 12 deficiency    Type 2 diabetes mellitus with hyperglycemia    Hoarseness, persistent    Ureterolithiasis    Acute cystitis without hematuria    Hepatic steatosis    Right flank pain, chronic    Detrusor instability of bladder    Frequency of micturition    Acute respiratory failure with hypercapnia    Hyperkalemia    Sepsis    Respiratory acidosis with metabolic acidosis    Acute respiratory failure    Diabetes mellitus type 2, insulin dependent    Diabetic peripheral neuropathy associated with type 2 diabetes mellitus        Past Medical History:   Diagnosis Date    A-fib     Abnormal ECG     Anemia     Anxiety     Asthma     Cancer     Ovarian    Depression     Diabetes mellitus     DVT (deep venous thrombosis)     Factor 5 Leiden mutation, heterozygous     Fibroid     GERD (gastroesophageal reflux disease)     Gout     H/O abdominal abscess     History of sepsis     History of transfusion     Hyperlipidemia     Hypothyroid     Kidney stone     Migraines      Neuropathy     Ovarian cancer 2021    Ovarian cyst     PE (pulmonary embolism)     Polycystic ovary syndrome     Preeclampsia     Rh incompatibility     Stroke     TIA (transient ischemic attack)     Urinary tract infection     Varicella         Past Surgical History:   Procedure Laterality Date    ABDOMINAL SURGERY      CARDIAC CATHETERIZATION       SECTION      CHOLECYSTECTOMY      COLONOSCOPY      ENDOSCOPY      EXTRACORPOREAL SHOCK WAVE LITHOTRIPSY (ESWL) Left 10/22/2021    Procedure: EXTRACORPOREAL SHOCKWAVE LITHOTRIPSY;  Surgeon: Milan Motley MD;  Location: [facility] OR;  Service: Urology;  Laterality: Left;    HYSTERECTOMY  2017    ovarian ca    INSERT CENTRAL LINE AT BEDSIDE  2025    LITHOTRIPSY      NEPHRECTOMY Left 10/2022    RIGHT OOPHORECTOMY      URETEROSCOPY LASER LITHOTRIPSY WITH STENT INSERTION Left 10/01/2021    Procedure: URETEROSCOPY WITH STENT PLACEMENT;  Surgeon: Milan Motley MD;  Location: [facility] OR;  Service: Urology;  Laterality: Left;    URETEROSCOPY LASER LITHOTRIPSY WITH STENT INSERTION Left 2022    Procedure: URETEROSCOPY STENT REMOVAL;  Surgeon: Milan Motley MD;  Location: [facility] OR;  Service: Urology;  Laterality: Left;    URETEROSCOPY LASER LITHOTRIPSY WITH STENT INSERTION Left 2022    Procedure: CYSTOSCOPY RETROGRADE LEFT WITH STENT PLACEMENT;  Surgeon: Milan Motley MD;  Location:  [facility] OR;  Service: Urology;  Laterality: Left;           Wound 25 0400 Left lateral flank (Active)   Wound Image   25 1500   Dressing Appearance open to air 25 1500   Closure Adhesive bandage 25 0830   Base dry;slough;yellow;brown 25 1500   Periwound dry;intact 25 1500   Periwound Temperature warm 25 1500   Periwound Skin Turgor soft 25 1500   Edges open;irregular 25 1500   Drainage Amount none 25 1500   Care, Wound cleansed with;wound cleanser;debrided 25 1500    Dressing Care foam;low-adherent;petroleum-based 01/28/25 1500   Periwound Care dry periwound area maintained 01/28/25 1500         WOUND DEBRIDEMENT  Total area of Debridement: ~4cm2  Debridement Site 1  Location- Site 1: L lateral back  Selective Debridement- Site 1: Wound Surface <20cmsq  Instruments- Site 1: tweezers, scapel, #15  Excised Tissue Description- Site 1: minimum, slough  Bleeding- Site 1: none               PT Assessment (Last 12 Hours)       PT Evaluation and Treatment       Row Name 01/28/25 1500          Physical Therapy Time and Intention    Subjective Information no complaints  -     Document Type wound care;therapy note (daily note)  -MF     Mode of Treatment individual therapy;physical therapy  -       Row Name 01/28/25 1500          Pain Scale: FACES Pre/Post-Treatment    Pain: FACES Scale, Pretreatment 0-->no hurt  -MF     Posttreatment Pain Rating 0-->no hurt  -MF       Row Name 01/28/25 1500          Wound 01/05/25 0400 Left lateral flank    Wound - Properties Group Placement Date: 01/05/25 -BC Placement Time: 0400 -BC Side: Left  -BC Orientation: lateral  -BC Location: flank  -BC    Wound Image Images linked: 1  -MF     Dressing Appearance open to air  -MF     Base dry;slough;yellow;brown  -MF     Periwound dry;intact  -MF     Periwound Temperature warm  -     Periwound Skin Turgor soft  -MF     Edges open;irregular  -MF     Drainage Amount none  -MF     Care, Wound cleansed with;wound cleanser;debrided  -MF     Dressing Care foam;low-adherent;petroleum-based  xeroform with optifoam  -     Periwound Care dry periwound area maintained  -     Retired Wound - Properties Group Placement Date: 01/05/25 -BC Placement Time: 0400 -BC Side: Left  -BC Orientation: lateral  -BC Location: flank  -BC    Retired Wound - Properties Group Placement Date: 01/05/25 -BC Placement Time: 0400 -BC Side: Left  -BC Orientation: lateral  -BC Location: flank  -BC    Retired Wound - Properties  Group Date first assessed: 01/05/25  -BC Time first assessed: 0400  -BC Side: Left  -BC Location: flank  -BC      Row Name             Wound 01/15/25 0314 Left gluteal blisters    Wound - Properties Group Placement Date: 01/15/25  -SE Placement Time: 0314  -SE Side: Left  -SE Location: gluteal  -SE Primary Wound Type: Blisters  -SE Type: blisters  -SE Present on Original Admission: N  -SE    Retired Wound - Properties Group Placement Date: 01/15/25  -SE Placement Time: 0314  -SE Present on Original Admission: N  -SE Side: Left  -SE Location: gluteal  -SE Primary Wound Type: Blisters  -SE Type: blisters  -SE    Retired Wound - Properties Group Placement Date: 01/15/25  -SE Placement Time: 0314  -SE Present on Original Admission: N  -SE Side: Left  -SE Location: gluteal  -SE Primary Wound Type: Blisters  -SE Type: blisters  -SE    Retired Wound - Properties Group Date first assessed: 01/15/25  -SE Time first assessed: 0314  -SE Present on Original Admission: N  -SE Side: Left  -SE Location: gluteal  -SE Primary Wound Type: Blisters  -SE Type: blisters  -SE      Row Name             Wound 01/17/25 2133 Right lateral throat    Wound - Properties Group Placement Date: 01/17/25  -MH Placement Time: 2133 -MH Side: Right  -MH Orientation: lateral  -MH Location: throat  -MH Primary Wound Type: Puncture  -MH    Retired Wound - Properties Group Placement Date: 01/17/25  -MH Placement Time: 2133 -MH Side: Right  -MH Orientation: lateral  -MH Location: throat  -MH Primary Wound Type: Puncture  -MH    Retired Wound - Properties Group Placement Date: 01/17/25  - Placement Time: 2133 -MH Side: Right  -MH Orientation: lateral  -MH Location: throat  -MH Primary Wound Type: Puncture  -MH    Retired Wound - Properties Group Date first assessed: 01/17/25  -MH Time first assessed: 2133 -MH Side: Right  -MH Location: throat  -MH Primary Wound Type: Puncture  -MH      Row Name             Wound 01/21/25 0746 lumbar spine    Wound -  Properties Group Placement Date: 01/21/25  - Placement Time: 0746 -AH Location: lumbar spine  -AH Primary Wound Type: MASD  -AH, large white wound, appearing to be worse than MASD  Present on Original Admission: N  - Additional Comments: nurse put zeroform and mepliex  -AH    Retired Wound - Properties Group Placement Date: 01/21/25  - Placement Time: 0746  - Present on Original Admission: N  - Location: lumbar spine  -AH Primary Wound Type: MASD  -AH, large white wound, appearing to be worse than MASD  Additional Comments: nurse put zeroform and mepliex  -AH    Retired Wound - Properties Group Placement Date: 01/21/25  - Placement Time: 0746  - Present on Original Admission: N  - Location: lumbar spine  -AH Primary Wound Type: MASD  -AH, large white wound, appearing to be worse than MASD  Additional Comments: nurse put zeroform and mepliex  -AH    Retired Wound - Properties Group Date first assessed: 01/21/25  - Time first assessed: 0746  - Present on Original Admission: N  -AH Location: lumbar spine  -AH Primary Wound Type: MASD  -AH, large white wound, appearing to be worse than MASD  Additional Comments: nurse put zeroform and mepliex  -AH      Row Name 01/28/25 1500          Coping    Observed Emotional State calm;cooperative;quiet  -     Verbalized Emotional State acceptance  -     Trust Relationship/Rapport care explained  -       Row Name 01/28/25 1500          Plan of Care Review    Plan of Care Reviewed With patient  -     Outcome Evaluation L lat flank wound stable with no significant change noted. PT was able to debride a small amount of nonviable slough.  no significant change noted with MIST therapy and with heavy slough covering PT will hold MIST and recheck in 2-3 days.  -       Row Name 01/28/25 1500          Positioning and Restraints    Pre-Treatment Position in bed  -     Post Treatment Position bed  -     In Bed supine;call light within reach  -                User Key  (r) = Recorded By, (t) = Taken By, (c) = Cosigned By      Initials Name Provider Type    Ted Prescott, PT Physical Therapist    Jojo Moe RN Registered Nurse    Chary Bloom RN Registered Nurse    Viola Espinoza RN Registered Nurse    Rajani Montilla RN Registered Nurse                  Physical Therapy Education       Title: PT OT SLP Therapies (In Progress)       Topic: Physical Therapy (In Progress)       Point: Mobility training (In Progress)       Learning Progress Summary            Patient Acceptance, E, VU by ER at 1/27/2025 1127    Comment: progress, POC, need for PT    Acceptance, E, VU,NR by ML at 1/24/2025 1151    Acceptance, E, VU,NR by NS at 1/22/2025 1613    Comment: pt encouraged to continue with HEP between therapy sessions    Acceptance, E, NR by ML at 1/19/2025 1124   Family Acceptance, E, NR by ML at 1/19/2025 1124                      Point: Home exercise program (Done)       Learning Progress Summary            Patient Acceptance, E, VU by ER at 1/27/2025 1127    Comment: progress, POC, need for PT    Acceptance, E, VU,NR by ML at 1/24/2025 1151    Acceptance, E, VU,NR by NS at 1/22/2025 1613    Comment: pt encouraged to continue with HEP between therapy sessions                      Point: Body mechanics (Done)       Learning Progress Summary            Patient Acceptance, E, VU by ER at 1/27/2025 1127    Comment: progress, POC, need for PT    Acceptance, E, VU,NR by ML at 1/24/2025 1151    Acceptance, E, VU,NR by NS at 1/22/2025 1613    Comment: pt encouraged to continue with HEP between therapy sessions                      Point: Precautions (In Progress)       Learning Progress Summary            Patient Acceptance, E, VU by ER at 1/27/2025 1127    Comment: progress, POC, need for PT    Acceptance, E, VU,NR by NS at 1/22/2025 1613    Comment: pt encouraged to continue with HEP between therapy sessions    Acceptance, E, NR by ML at  1/19/2025 1124   Family Acceptance, E, NR by  at 1/19/2025 1124                                      User Key       Initials Effective Dates Name Provider Type Discipline    NS 06/16/21 -  Germaine Thao, PT Physical Therapist PT     04/22/21 -  Debbi Hall Physical Therapist PT    ER 12/13/24 -  Yolande Nassar, PT Physical Therapist PT                    Recommendation and Plan  Anticipated Discharge Disposition (PT): inpatient rehabilitation facility  Planned Therapy Interventions (PT): balance training, bed mobility training, gait training, home exercise program, patient/family education, postural re-education, strengthening, transfer training, ROM (range of motion), neuromuscular re-education  Therapy Frequency (PT): daily  Plan of Care Reviewed With: patient           Outcome Evaluation: L lat flank wound stable with no significant change noted. PT was able to debride a small amount of nonviable slough.  no significant change noted with MIST therapy and with heavy slough covering PT will hold MIST and recheck in 2-3 days.  Plan of Care Reviewed With: patient            Time Calculation   PT Charges       Row Name 01/28/25 1500             Time Calculation    Start Time 1500  -MF      PT Goal Re-Cert Due Date 01/29/25  -                User Key  (r) = Recorded By, (t) = Taken By, (c) = Cosigned By      Initials Name Provider Type    Ted Prescott, PT Physical Therapist                            PT G-Codes  Outcome Measure Options: AM-PAC 6 Clicks Basic Mobility (PT)  AM-PAC 6 Clicks Score (PT): 12  AM-PAC 6 Clicks Score (OT): 8       Ted Carbone, PT  1/28/2025

## 2025-01-28 NOTE — THERAPY TREATMENT NOTE
Acute Care - Speech Language Pathology   Swallow Treatment Note [facility]     Patient Name: Natalia Arzate  : 1985  MRN: [id number]  Today's Date: 2025               Admit Date: 2025    Visit Dx:     ICD-10-CM ICD-9-CM   1. Respiratory acidosis with metabolic acidosis  E87.4 276.3   2. Acute respiratory failure with hypoxia  J96.01 518.81   3. MARIAA (acute kidney injury)  N17.9 584.9   4. Pharyngeal dysphagia  R13.13 787.23     Patient Active Problem List   Diagnosis    Nephrolithiasis    Ovarian teratoma, right    Other chronic pain    Pelvic pain    History of DVT in adulthood    History of pulmonary embolism    Ureteral calculus    Ischemic cerebrovascular accident (CVA)    Primary hypertension    Left lumbar radiculopathy    PTSD (post-traumatic stress disorder)    MARIAA (acute kidney injury)    Anemia    Dyslipidemia    Hypothyroid    Other secondary gout, multiple sites    Factor 5 Leiden mutation, heterozygous    Vitamin D deficiency    Vitamin B 12 deficiency    Type 2 diabetes mellitus with hyperglycemia    Hoarseness, persistent    Ureterolithiasis    Acute cystitis without hematuria    Hepatic steatosis    Right flank pain, chronic    Detrusor instability of bladder    Frequency of micturition    Acute respiratory failure with hypercapnia    Hyperkalemia    Sepsis    Respiratory acidosis with metabolic acidosis    Acute respiratory failure    Diabetes mellitus type 2, insulin dependent    Diabetic peripheral neuropathy associated with type 2 diabetes mellitus     Past Medical History:   Diagnosis Date    A-fib     Abnormal ECG     Anemia     Anxiety     Asthma     Cancer     Ovarian    Depression     Diabetes mellitus     DVT (deep venous thrombosis)     Factor 5 Leiden mutation, heterozygous     Fibroid     GERD (gastroesophageal reflux disease)     Gout     H/O abdominal abscess     History of sepsis     History of transfusion     Hyperlipidemia     Hypothyroid     Kidney stone      Migraines     Neuropathy     Ovarian cancer 2021    Ovarian cyst     PE (pulmonary embolism)     Polycystic ovary syndrome     Preeclampsia     Rh incompatibility     Stroke     TIA (transient ischemic attack)     Urinary tract infection     Varicella      Past Surgical History:   Procedure Laterality Date    ABDOMINAL SURGERY      CARDIAC CATHETERIZATION       SECTION      CHOLECYSTECTOMY      COLONOSCOPY      ENDOSCOPY      EXTRACORPOREAL SHOCK WAVE LITHOTRIPSY (ESWL) Left 10/22/2021    Procedure: EXTRACORPOREAL SHOCKWAVE LITHOTRIPSY;  Surgeon: Milan Motley MD;  Location: [facility] OR;  Service: Urology;  Laterality: Left;    HYSTERECTOMY  2017    ovarian ca    INSERT CENTRAL LINE AT BEDSIDE  2025    LITHOTRIPSY      NEPHRECTOMY Left 10/2022    RIGHT OOPHORECTOMY      URETEROSCOPY LASER LITHOTRIPSY WITH STENT INSERTION Left 10/01/2021    Procedure: URETEROSCOPY WITH STENT PLACEMENT;  Surgeon: Milan Motley MD;  Location: [facility] OR;  Service: Urology;  Laterality: Left;    URETEROSCOPY LASER LITHOTRIPSY WITH STENT INSERTION Left 2022    Procedure: URETEROSCOPY STENT REMOVAL;  Surgeon: Milan Motley MD;  Location: [facility] OR;  Service: Urology;  Laterality: Left;    URETEROSCOPY LASER LITHOTRIPSY WITH STENT INSERTION Left 2022    Procedure: CYSTOSCOPY RETROGRADE LEFT WITH STENT PLACEMENT;  Surgeon: Milan Motley MD;  Location: [facility] OR;  Service: Urology;  Laterality: Left;       SLP Recommendation and Plan  SLP Swallowing Diagnosis: mild, oral dysphagia, moderate, pharyngeal dysphagia (25 1400)  SLP Diet Recommendation: soft to chew textures, chopped, no mixed consistencies, honey thick liquids (25 1400)  Recommended Precautions and Strategies: general aspiration precautions, upright posture during/after eating, small bites of food and sips of liquid, check mouth frequently for oral residue/pocketing, assist with feeding (25  1400)  SLP Rec. for Method of Medication Administration: meds via alternate route (01/28/25 1400)     Monitor for Signs of Aspiration: yes, notify SLP if any concerns (01/28/25 1400)     Swallow Criteria for Skilled Therapeutic Interventions Met: demonstrates skilled criteria (01/28/25 1400)  Anticipated Discharge Disposition (SLP): inpatient rehabilitation facility (01/28/25 1400)  Rehab Potential/Prognosis, Swallowing: good, to achieve stated therapy goals (01/28/25 1400)  Therapy Frequency (Swallow): 5 days per week (01/28/25 1400)  Predicted Duration Therapy Intervention (Days): 1 week (01/28/25 1400)  Oral Care Recommendations: Oral Care BID/PRN, Toothbrush (01/28/25 1400)        Daily Summary of Progress (SLP): progress toward functional goals as expected (01/28/25 1400)               Treatment Assessment (SLP): continued, suspected, pharyngeal dysphagia (01/28/25 1400)  Treatment Assessment Comments (SLP): Handout of exercises provided, reviewed and completed. Pt able to complete all with min cues. Tolerated trials of soft solids and honey thick liquids w/o s/s of aspiration. (01/28/25 1400)  Plan for Continued Treatment (SLP): continue treatment per plan of care (01/28/25 1400)                SWALLOW EVALUATION (Last 72 Hours)       SLP Adult Swallow Evaluation       Row Name 01/28/25 1400 01/27/25 1300                Rehab Evaluation    Document Type therapy note (daily note)  -CH re-evaluation  -CJ       Subjective Information no complaints  -CH no complaints  -CJ       Patient Observations alert;cooperative;agree to therapy  -CH alert;cooperative  -CJ       Patient/Family/Caregiver Comments/Observations S.O. present  -CH fam present  -CJ       Patient Effort good  -CH good  -CJ       Symptoms Noted During/After Treatment none  -CH none  -CJ       Oral Care oral rinse provided  - --          General Information    Patient Profile Reviewed yes  -CH yes  -CJ       Pertinent History Of Current Problem --  see initial; CSE completed @ 1300 followed by FEES @ 1410  -       Current Method of Nutrition -- NPO;nasogastric feedings;small-bore  -       Precautions/Limitations, Vision -- WFL;for purposes of eval  -CJ       Precautions/Limitations, Hearing -- WFL;for purposes of eval  -CJ       Prior Level of Function-Communication -- unknown  -       Prior Level of Function-Swallowing -- other (see comments)  -       Plans/Goals Discussed with -- patient;agreed upon  -       Barriers to Rehab -- none identified  -       Patient's Goals for Discharge -- return to PO diet  -          Pain    Additional Documentation -- Pain Scale: FACES Pre/Post-Treatment (Group)  -          Pain Scale: FACES Pre/Post-Treatment    Pain: FACES Scale, Pretreatment 0-->no hurt  -CH 2-->hurts little bit  -       Posttreatment Pain Rating 0-->no hurt  -CH 2-->hurts little bit  -          Oral Motor Structure and Function    Secretion Management -- WNL/WFL  -       Mucosal Quality -- moist, healthy  -          General Eating/Swallowing Observations    Respiratory Support Currently in Use -- room air  -       Eating/Swallowing Skills -- self-fed;fed by SLP  -       Positioning During Eating -- upright 90 degree;upright in bed  -       Utensils Used -- spoon;cup;straw  -       Consistencies Trialed -- ice chips;thin liquids;nectar/syrup-thick liquids;pureed  -          Respiratory    Date of Intubation -- 1/1-1/17  -          Clinical Swallow Eval    Oral Prep Phase -- impaired  -       Oral Transit -- impaired  -       Oral Residue -- WFL  -       Pharyngeal Phase -- suspected pharyngeal impairment  -CJ       Esophageal Phase -- unremarkable  -       Clinical Swallow Evaluation Summary -- Improved overall status, improved vocal quality. Cough intermittently w/ thins. Will complete FEES  -          Fiberoptic Endoscopic Evaluation of Swallowing (FEES)    Risks/Benefits Reviewed -- risks/benefits  explained;patient;agreed to eval  -CJ       Nasal Entry -- right:  -CJ          Anatomy and Physiology    Anatomic Considerations -- edema;erythema  -CJ       Velopharyngeal -- WFL  -CJ       Base of Tongue -- symmetrical;range adequate  -CJ       Epiglottis -- WFL  -CJ       Laryngeal Function Breathing -- symmetrical  -CJ       Laryngeal Function Phonation -- symmetrical  -CJ       Laryngeal Function to Breath Hold -- TVF/FVF/Arytenoid;sustained closure  -CJ       Secretion Rating Scale (Apollo et al. 1996) -- 0- normal, no visible secretions  -CJ       Secretion Description -- thin;clear  -CJ       Ice Chips -- elicited swallow  -CJ       Spontaneous Swallow -- frequency adequate  -CJ       Sensory -- sensed scope  -CJ       Utensils Used -- Spoon;Cup;Straw  -CJ       Consistencies Trialed -- ice chips;thin liquids;nectar-thick liquids;honey-thick liquids;pudding/puree;regular textures  -CJ          FEES Interpretation    Oral Phase -- prespill of liquids into pharynx  -CJ          Initiation of Pharyngeal Swallow    Initiation of Pharyngeal Swallow -- bolus in pyriform sinuses  -CJ       Pharyngeal Phase -- impaired pharyngeal phase of swallowing  -CJ       Aspiration During the Swallow -- thin liquids;nectar-thick liquids;secondary to delayed swallow initiation or mistiming;secondary to reduced laryngeal elevation;secondary to reduced vestibular closure  -CJ       Aspiration After the Swallow -- nectar-thick liquids;thin liquids;secondary to residue;in laryngeal vestibule;in pyriform sinuses  -CJ       Depth of Penetration -- deep  -CJ       Response to Penetration -- No  -CJ       No spontaneous response to penetration and -- non-effective laryngeal clearance with cue (see comments)  -CJ       Response to Aspiration -- No  -CJ       No spontaneous response to aspiration with -- non-effective subglottic clearance with cue (see comments)  -CJ       Rosenbek's Scale -- nectar:;thin:;8-->Level 8  -CJ       Residue  -- thin liquids;nectar-thick liquids;diffuse within pharynx;secondary to reduced posterior pharyngeal wall stripping;secondary to reduced laryngeal elevation;secondary to reduced hyolaryngeal excursion  -CJ       Response to Residue -- partial residue clearance;with cued swallow  -CJ       Attempted Compensatory Maneuvers -- bolus size;bolus presentation style;additional subsequent swallow;throat clear after swallow  -CJ       Response to Attempted Compensatory Maneuvers -- did not prevent aspiration  -CJ       Successful Compensatory Maneuver Competency -- patient able to;demonstrate compensations  -CJ       Pharyngeal Phase, Comment -- Improved oropharyngeal dysphagia. Silent aspiraiton w/ thins/nectar thick liquids only. Pushed for fatigue and no penetration/aspiration w/ soft solids or honey thick liquids. Will initaite po diet  -          SLP Evaluation Clinical Impression    SLP Swallowing Diagnosis mild;oral dysphagia;moderate;pharyngeal dysphagia  - mild;oral dysphagia;moderate;pharyngeal dysphagia  -CJ       Functional Impact risk of aspiration/pneumonia  - risk of aspiration/pneumonia  -       Rehab Potential/Prognosis, Swallowing good, to achieve stated therapy goals  -CH good, to achieve stated therapy goals  -       Swallow Criteria for Skilled Therapeutic Interventions Met demonstrates skilled criteria  - demonstrates skilled criteria  -          SLP Treatment Clinical Impressions    Treatment Assessment (SLP) continued;suspected;pharyngeal dysphagia  -CH --       Treatment Assessment Comments (SLP) Handout of exercises provided, reviewed and completed. Pt able to complete all with min cues. Tolerated trials of soft solids and honey thick liquids w/o s/s of aspiration.  -CH --       Daily Summary of Progress (SLP) progress toward functional goals as expected  -CH --       Plan for Continued Treatment (SLP) continue treatment per plan of care  -CH --       Care Plan Review  evaluation/treatment results reviewed;care plan/treatment goals reviewed;risks/benefits reviewed  - --          Recommendations    Therapy Frequency (Swallow) 5 days per week  - 5 days per week  -       Predicted Duration Therapy Intervention (Days) 1 week  - 1 week  -       SLP Diet Recommendation soft to chew textures;chopped;no mixed consistencies;honey thick liquids  - soft to chew textures;chopped;no mixed consistencies;honey thick liquids  -       Recommended Precautions and Strategies general aspiration precautions;upright posture during/after eating;small bites of food and sips of liquid;check mouth frequently for oral residue/pocketing;assist with feeding  - general aspiration precautions;upright posture during/after eating;small bites of food and sips of liquid;check mouth frequently for oral residue/pocketing;assist with feeding  -       Oral Care Recommendations Oral Care BID/PRN;Toothbrush  - Oral Care BID/PRN;Toothbrush  -       SLP Rec. for Method of Medication Administration meds via alternate route  - meds via alternate route  -       Monitor for Signs of Aspiration yes;notify SLP if any concerns  - yes;notify SLP if any concerns  -       Anticipated Discharge Disposition (SLP) inpatient rehabilitation facility  - inpatient rehabilitation facility  -                 User Key  (r) = Recorded By, (t) = Taken By, (c) = Cosigned By      Initials Name Effective Dates     Chelo Pabon MS CCC-SLP 01/21/25 -      Rocio Bran MS CCC-SLP 01/20/25 -                     EDUCATION  The patient has been educated in the following areas:   Home Exercise Program (HEP) Dysphagia (Swallowing Impairment) Oral Care/Hydration Modified Diet Instruction.        SLP GOALS       Row Name 01/28/25 1400 01/27/25 1300          (LTG) Patient will demonstrate progress toward functional swallow for    Diet Texture (Demonstrate progress toward functional swallow) regular textures   -CH regular textures  -CJ     Liquid viscosity (Demonstrate progress toward functional swallow) thin liquids  -CH thin liquids  -CJ     [place] (Demonstrate progress towards functional swallow) with minimal cues (75-90% accuracy)  -CH with minimal cues (75-90% accuracy)  -CJ     Time Frame (Demonstrate progress toward functional swallow) 1 week  -CH 1 week  -CJ     Progress/Outcomes (Demonstrate progress toward functional swallow) goal ongoing  -CH goal ongoing  -CJ        (STG) Patient will tolerate trials of    Consistencies Trialed (Tolerate trials) soft to chew (chopped) textures;honey/ moderately thick liquids  -CH soft to chew (chopped) textures;honey/ moderately thick liquids  -CJ     Desired Outcome (Tolerate trials) without signs/symptoms of aspiration;without signs of distress  -CH without signs/symptoms of aspiration;without signs of distress  -CJ     [place] (Tolerate trials) independently (over 90% accuracy)  -CH independently (over 90% accuracy)  -CJ     Time Frame (Tolerate trials) 1 week  -CH 1 week  -CJ     Progress/Outcomes (Tolerate trials) continuing progress toward goal  -CH goal revised this date  -CJ     Comment (Tolerate trials) No s/s of aspiration  -CH --        (STG) Patient will tolerate therapeutic trials of    Consistencies Trialed (Tolerate therapeutic trials) thin liquids  -CH --     Desired Outcome (Tolerate therapeutic trials) without signs/symptoms of aspiration  -CH --     [place] (Tolerate therapeutic trials) with minimal cues (75-90% accuracy)  -CH --     Time Frame (Tolerate therapeutic trials) 1 week  -CH --     Progress/Outcomes (Tolerate therapeutic trials) goal ongoing  -CH --     Comment (Tolerate therapeutic trials) hard cough w/ trials of thins  -CH --        (STG) Lingual Strengthening Goal 1 (SLP)    Activity (Lingual Strengthening Goal 1, SLP) increase lingual tone/sensation/control/coordination/movement;increase tongue back strength  -CH --      Increase Lingual Tone/Sensation/Control/Coordination/Movement lingual resistance exercises;swallow trials  -CH --     Increase Tongue Back Strength lingual resistance exercises  -CH --     [place]/Accuracy (Lingual Strengthening Goal 1, SLP) with minimal cues (75-90% accuracy)  -CH --     Time Frame (Lingual Strengthening Goal 1, SLP) 1 week  -CH --     Progress/Outcomes (Lingual Strengthening Goal 1, SLP) goal no longer appropriate  -CH goal no longer appropriate  -CJ        ([facility]) Pharyngeal Strengthening Exercise Goal 1 (SLP)    Activity (Pharyngeal Strengthening Goal 1, SLP) increase timing;increase superior movement of the hyolaryngeal complex;increase anterior movement of the hyolaryngeal complex;increase closure at entrance to airway/closure of airway at glottis;increase squeeze/positive pressure generation  -CH increase timing;increase superior movement of the hyolaryngeal complex;increase anterior movement of the hyolaryngeal complex;increase closure at entrance to airway/closure of airway at glottis;increase squeeze/positive pressure generation  -CJ     Increase Timing prepping - 3 second prep or suck swallow or 3-step swallow  -CH prepping - 3 second prep or suck swallow or 3-step swallow  -CJ     Increase Superior Movement of the Hyolaryngeal Complex effortful pitch glide (falsetto + pharyngeal squeeze)  -CH effortful pitch glide (falsetto + pharyngeal squeeze)  -CJ     Increase Anterior Movement of the Hyolaryngeal Complex chin tuck against resistance (CTAR)  -CH chin tuck against resistance (CTAR)  -CJ     Increase Closure at Entrance to Airway/Closure of Airway at Glottis supraglottic swallow  -CH supraglottic swallow  -CJ     Increase Squeeze/Positive Pressure Generation hard effortful swallow  -CH hard effortful swallow  -CJ     [place]/Accuracy (Pharyngeal Strengthening Goal 1, SLP) with minimal cues (75-90% accuracy)  -CH with minimal cues (75-90% accuracy)  -CJ     Time Frame (Pharyngeal  Strengthening Goal 1, SLP) 1 week  - 1 week  -CJ     Progress/Outcomes (Pharyngeal Strengthening Goal 1, SLP) continuing progress toward goal  -CH goal ongoing  -CJ     Comment (Pharyngeal Strengthening Goal 1, SLP) reviewed and completed all with good effort  - --               User Key  (r) = Recorded By, (t) = Taken By, (c) = Cosigned By      Initials Name Provider Type     Chelo Pabon MS CCC-SLP Speech and Language Pathologist    Rocio Rai MS CCC-SLP Speech and Language Pathologist                         Time Calculation:    Time Calculation- SLP       Row Name 01/28/25 1619             Time Calculation- SLP    SLP Start Time 1400  -CH      SLP Received On 01/28/25  -         Untimed Charges    40541-UV Treatment Swallow Minutes 45  -CH         Total Minutes    Untimed Charges Total Minutes 45  -CH       Total Minutes 45  -CH                User Key  (r) = Recorded By, (t) = Taken By, (c) = Cosigned By      Initials Name Provider Type    Rocio Rai MS CCC-SLP Speech and Language Pathologist                    Therapy Charges for Today       Code Description Service Date Service Provider Modifiers Qty    [id number]  ST TREATMENT SWALLOW 3 1/28/2025 Rocio Bran MS CCC-SLP GN 1                 Rocio Bran MS CCC-SLP  1/28/2025

## 2025-01-28 NOTE — CASE MANAGEMENT/SOCIAL WORK
"Continued Stay Note  [facility]     Patient Name: Natalia Arzate  MRN: [id number]  Today's Date: 1/28/2025    Admit Date: 1/2/2025    Plan: Rehab   Discharge Plan       Row Name 01/28/25 1257       Plan    Plan Rehab    Plan Comments Pt is tolerating PO diet and no longer has a rectal tube. CT RUE was negative. She walked 1ft with a 2 person assist and a walker yesterday. Five of nine referrals sent yesterday were declined (namely [facility] - \"too medically complex\", [facility] - no Medicaid beds, [facility] - \"too medically complex\", and [facility] - due to weight). Current referrals pending: [facility] (currently reviewing per Mira), [facility] (left message for Nata), and [facility] (left message for Mary Anne). I left another message for Luz Elena with [facility] to call me back with the fax number to send the referral.  will continue to follow.    Final Discharge Disposition Code 03 - skilled nursing facility (SNF)                   Discharge Codes    No documentation.                 Expected Discharge Date and Time       Expected Discharge Date Expected Discharge Time    Jan 29, 2025               Pallavi Donohue RN    "

## 2025-01-29 LAB
GLUCOSE BLDC GLUCOMTR-MCNC: 243 MG/DL (ref 70–130)
GLUCOSE BLDC GLUCOMTR-MCNC: 250 MG/DL (ref 70–130)
GLUCOSE BLDC GLUCOMTR-MCNC: 260 MG/DL (ref 70–130)
GLUCOSE BLDC GLUCOMTR-MCNC: 380 MG/DL (ref 70–130)

## 2025-01-29 PROCEDURE — 63710000001 INSULIN LISPRO (HUMAN) PER 5 UNITS

## 2025-01-29 PROCEDURE — 97535 SELF CARE MNGMENT TRAINING: CPT

## 2025-01-29 PROCEDURE — 97530 THERAPEUTIC ACTIVITIES: CPT

## 2025-01-29 PROCEDURE — 63710000001 INSULIN GLARGINE PER 5 UNITS: Performed by: INTERNAL MEDICINE

## 2025-01-29 PROCEDURE — 25010000002 METOCLOPRAMIDE PER 10 MG: Performed by: PEDIATRICS

## 2025-01-29 PROCEDURE — 25010000002 ENOXAPARIN PER 10 MG: Performed by: INTERNAL MEDICINE

## 2025-01-29 PROCEDURE — 82948 REAGENT STRIP/BLOOD GLUCOSE: CPT

## 2025-01-29 PROCEDURE — 97110 THERAPEUTIC EXERCISES: CPT

## 2025-01-29 PROCEDURE — 63710000001 INSULIN GLARGINE PER 5 UNITS: Performed by: PEDIATRICS

## 2025-01-29 PROCEDURE — 99232 SBSQ HOSP IP/OBS MODERATE 35: CPT | Performed by: INTERNAL MEDICINE

## 2025-01-29 PROCEDURE — 25010000002 HYDROMORPHONE PER 4 MG: Performed by: INTERNAL MEDICINE

## 2025-01-29 PROCEDURE — 63710000001 INSULIN LISPRO (HUMAN) PER 5 UNITS: Performed by: INTERNAL MEDICINE

## 2025-01-29 RX ORDER — INSULIN LISPRO 100 [IU]/ML
20 INJECTION, SOLUTION INTRAVENOUS; SUBCUTANEOUS
Status: DISCONTINUED | OUTPATIENT
Start: 2025-01-29 | End: 2025-02-02

## 2025-01-29 RX ORDER — TORSEMIDE 20 MG/1
40 TABLET ORAL ONCE
Status: COMPLETED | OUTPATIENT
Start: 2025-01-29 | End: 2025-01-29

## 2025-01-29 RX ADMIN — HYDROMORPHONE HYDROCHLORIDE 0.5 MG: 1 INJECTION, SOLUTION INTRAMUSCULAR; INTRAVENOUS; SUBCUTANEOUS at 09:07

## 2025-01-29 RX ADMIN — INSULIN LISPRO 8 UNITS: 100 INJECTION, SOLUTION INTRAVENOUS; SUBCUTANEOUS at 20:53

## 2025-01-29 RX ADMIN — Medication 10 ML: at 20:51

## 2025-01-29 RX ADMIN — INSULIN LISPRO 12 UNITS: 100 INJECTION, SOLUTION INTRAVENOUS; SUBCUTANEOUS at 12:30

## 2025-01-29 RX ADMIN — METOCLOPRAMIDE HYDROCHLORIDE 10 MG: 10 INJECTION, SOLUTION INTRAMUSCULAR; INTRAVENOUS at 17:12

## 2025-01-29 RX ADMIN — INSULIN LISPRO 12 UNITS: 100 INJECTION, SOLUTION INTRAVENOUS; SUBCUTANEOUS at 17:43

## 2025-01-29 RX ADMIN — INSULIN LISPRO 20 UNITS: 100 INJECTION, SOLUTION INTRAVENOUS; SUBCUTANEOUS at 17:12

## 2025-01-29 RX ADMIN — HYDROCODONE BITARTRATE AND ACETAMINOPHEN 1 TABLET: 5; 325 TABLET ORAL at 12:26

## 2025-01-29 RX ADMIN — Medication 5 MG: at 20:52

## 2025-01-29 RX ADMIN — HYDROCODONE BITARTRATE AND ACETAMINOPHEN 1 TABLET: 5; 325 TABLET ORAL at 17:12

## 2025-01-29 RX ADMIN — INSULIN GLARGINE 50 UNITS: 100 INJECTION, SOLUTION SUBCUTANEOUS at 20:53

## 2025-01-29 RX ADMIN — DULOXETINE HYDROCHLORIDE 60 MG: 60 CAPSULE, DELAYED RELEASE ORAL at 09:04

## 2025-01-29 RX ADMIN — INSULIN LISPRO 15 UNITS: 100 INJECTION, SOLUTION INTRAVENOUS; SUBCUTANEOUS at 09:05

## 2025-01-29 RX ADMIN — GABAPENTIN 300 MG: 300 CAPSULE ORAL at 20:52

## 2025-01-29 RX ADMIN — METOPROLOL SUCCINATE 50 MG: 50 TABLET, EXTENDED RELEASE ORAL at 09:08

## 2025-01-29 RX ADMIN — METOPROLOL SUCCINATE 50 MG: 50 TABLET, EXTENDED RELEASE ORAL at 20:52

## 2025-01-29 RX ADMIN — GABAPENTIN 300 MG: 300 CAPSULE ORAL at 13:50

## 2025-01-29 RX ADMIN — NYSTATIN: 100000 POWDER TOPICAL at 09:05

## 2025-01-29 RX ADMIN — OXYCODONE HYDROCHLORIDE 5 MG: 5 SOLUTION ORAL at 00:40

## 2025-01-29 RX ADMIN — CHOLESTYRAMINE 4 G: 4 POWDER, FOR SUSPENSION ORAL at 05:33

## 2025-01-29 RX ADMIN — METOCLOPRAMIDE HYDROCHLORIDE 10 MG: 10 INJECTION, SOLUTION INTRAMUSCULAR; INTRAVENOUS at 22:54

## 2025-01-29 RX ADMIN — DULOXETINE HYDROCHLORIDE 30 MG: 30 CAPSULE, DELAYED RELEASE ORAL at 20:52

## 2025-01-29 RX ADMIN — APIXABAN 5 MG: 5 TABLET, FILM COATED ORAL at 20:52

## 2025-01-29 RX ADMIN — ENOXAPARIN SODIUM 120 MG: 120 INJECTION SUBCUTANEOUS at 00:39

## 2025-01-29 RX ADMIN — CHOLESTYRAMINE 4 G: 4 POWDER, FOR SUSPENSION ORAL at 13:50

## 2025-01-29 RX ADMIN — HYDROMORPHONE HYDROCHLORIDE 0.5 MG: 1 INJECTION, SOLUTION INTRAMUSCULAR; INTRAVENOUS; SUBCUTANEOUS at 22:54

## 2025-01-29 RX ADMIN — Medication 1 CAPSULE: at 09:04

## 2025-01-29 RX ADMIN — INSULIN LISPRO 20 UNITS: 100 INJECTION, SOLUTION INTRAVENOUS; SUBCUTANEOUS at 12:30

## 2025-01-29 RX ADMIN — HYDROMORPHONE HYDROCHLORIDE 0.5 MG: 1 INJECTION, SOLUTION INTRAMUSCULAR; INTRAVENOUS; SUBCUTANEOUS at 13:50

## 2025-01-29 RX ADMIN — HYDROMORPHONE HYDROCHLORIDE 0.5 MG: 1 INJECTION, SOLUTION INTRAMUSCULAR; INTRAVENOUS; SUBCUTANEOUS at 04:26

## 2025-01-29 RX ADMIN — HYDROCODONE BITARTRATE AND ACETAMINOPHEN 1 TABLET: 5; 325 TABLET ORAL at 06:18

## 2025-01-29 RX ADMIN — HYDROXYZINE HYDROCHLORIDE 25 MG: 25 TABLET ORAL at 20:52

## 2025-01-29 RX ADMIN — INSULIN LISPRO 20 UNITS: 100 INJECTION, SOLUTION INTRAVENOUS; SUBCUTANEOUS at 09:05

## 2025-01-29 RX ADMIN — NYSTATIN: 100000 POWDER TOPICAL at 20:53

## 2025-01-29 RX ADMIN — CHOLESTYRAMINE 4 G: 4 POWDER, FOR SUSPENSION ORAL at 20:51

## 2025-01-29 RX ADMIN — AMLODIPINE BESYLATE 10 MG: 10 TABLET ORAL at 09:04

## 2025-01-29 RX ADMIN — ENOXAPARIN SODIUM 120 MG: 120 INJECTION SUBCUTANEOUS at 12:30

## 2025-01-29 RX ADMIN — AMITRIPTYLINE HYDROCHLORIDE 25 MG: 25 TABLET, FILM COATED ORAL at 20:53

## 2025-01-29 RX ADMIN — HYDROMORPHONE HYDROCHLORIDE 0.5 MG: 1 INJECTION, SOLUTION INTRAMUSCULAR; INTRAVENOUS; SUBCUTANEOUS at 17:52

## 2025-01-29 RX ADMIN — TORSEMIDE 40 MG: 20 TABLET ORAL at 10:35

## 2025-01-29 RX ADMIN — METOCLOPRAMIDE HYDROCHLORIDE 10 MG: 10 INJECTION, SOLUTION INTRAMUSCULAR; INTRAVENOUS at 10:35

## 2025-01-29 RX ADMIN — INSULIN GLARGINE 35 UNITS: 100 INJECTION, SOLUTION SUBCUTANEOUS at 09:02

## 2025-01-29 RX ADMIN — COLLAGENASE SANTYL 1 APPLICATION: 250 OINTMENT TOPICAL at 09:06

## 2025-01-29 RX ADMIN — OXYCODONE HYDROCHLORIDE 5 MG: 5 SOLUTION ORAL at 20:52

## 2025-01-29 RX ADMIN — AVOBENZONE, HOMOSALATE, OCTISALATE, OCTOCRYLENE, AND OXYBENZONE 1 PACKET: 29.4; 147; 49; 25.4; 58.8 LOTION TOPICAL at 20:51

## 2025-01-29 RX ADMIN — METOCLOPRAMIDE HYDROCHLORIDE 10 MG: 10 INJECTION, SOLUTION INTRAMUSCULAR; INTRAVENOUS at 04:26

## 2025-01-29 RX ADMIN — GABAPENTIN 300 MG: 300 CAPSULE ORAL at 05:33

## 2025-01-29 NOTE — PROGRESS NOTES
Clinical Nutrition     Patient Name: Natalia Arzate  YOB: 1985  MRN: [id number]  Date of Encounter: 25 16:05 EST  Admission date: 2025  Reason for Visit: Follow-up protocol, EN    Assessment   Nutrition Assessment   Admission Diagnosis:  Acute respiratory failure [J96.00]    Problem List:    Acute respiratory failure with hypercapnia    History of DVT in adulthood    Primary hypertension    PTSD (post-traumatic stress disorder)    MARIAA (acute kidney injury)    Hypothyroid    Factor 5 Leiden mutation, heterozygous    Type 2 diabetes mellitus with hyperglycemia    Sepsis      PMH:   She  has a past medical history of A-fib, Abnormal ECG, Anemia, Anxiety, Asthma, Cancer, Depression, Diabetes mellitus, DVT (deep venous thrombosis), Factor 5 Leiden mutation, heterozygous, Fibroid, GERD (gastroesophageal reflux disease), Gout, H/O abdominal abscess, History of sepsis, History of transfusion, Hyperlipidemia, Hypothyroid, Kidney stone, Migraines, Neuropathy, Ovarian cancer (2021), Ovarian cyst, PE (pulmonary embolism), Polycystic ovary syndrome, Preeclampsia, Rh incompatibility, Stroke, TIA (transient ischemic attack), Urinary tract infection, and Varicella.    PSH:  She  has a past surgical history that includes  section; Cholecystectomy; Colonoscopy; Cardiac catheterization; Right oophorectomy; Abdominal surgery; Esophagogastroduodenoscopy; ureteroscopy laser lithotripsy with stent insertion (Left, 10/01/2021); Extracorporeal shock wave lithotripsy (Left, 10/22/2021); Lithotripsy; ureteroscopy laser lithotripsy with stent insertion (Left, 2022); ureteroscopy laser lithotripsy with stent insertion (Left, 2022); Nephrectomy (Left, 10/2022); Hysterectomy (2017); and Insert Central Line At Bedside (2025).    Substance history: Opioids    Applicable Nutrition History:   (25) ARF/VENT   (25) CRRT 24---stopped 1-10-25  (25) HD done  "once  (1/17/25) Extubated   (1/23) FEES: SLP Diet Recommendation: NPO, temporary alternate methods of nutrition/hydration, other (see comments) (x 4oz puree snack, 3x/day, as well as 6-8 ice chips or 1/4 tsp H2O/hour.  (1/27)  FEES SLP rec Soft to Chew w Chopped Meats No Mixed Consistencies Honey Thick Liquid    SKIN: L flank -wound, sacrum/coccyx- areas of friction damage, area purple slow to becki    Anthropometrics     Height: Height: 162.6 cm (64\")64in  Last Filed Weight: Weight: (!) 141 kg (310 lb 13.6 oz) (01/29/25 0508)350lb  Method: Weight Method: Bed scale  BMI: BMI (Calculated): 53.360    UBW: last MD office weight 336lb    Weight change:  ? 27lb wt gain since January    Nutrition Focused Physical Exam     Date:     Unable to perform due to Pt unable to participate at time of visit     Subjective   Reported/Observed/Food/Nutrition Related History:     1/29  Diet advanced s/p SLP eval-pt states she is eating well. Pt is agreeable to ONS, will send. Pt does not like chocolate. Pt also notes she likes regular Ensure, explained RD can send once fluid consistencies upgraded.     1/27  EN infusing at goal at time of visit. APRN rpt cont issue w diarrhea. Note later in day, FEES w diet advance form 1 pureed item 3x/da .    1/23  She is tolerating @ goal rate. Tube clogged again and has been on hold since 10am.      Patient reports she gets nausea when she drinks milk at home.  Food that has dairy gives her diarrhea. She is not sure if she had lactose intolerance. She noticed change in her food tolerance after she had her gallbalder removed in 2017    RN reports patient now having frequent BM.  Could be exacerbated by Reglan     FEES completed with recs for NPO status.      1/22  Pt up in chair, notes some nausea. Spoke w/RN, states pt started on reglan. RN does not suspect pt nausea is TF related as pt also reporting to RN that she has vertigo. RN notes keofeed clogged earlier, suspects r/t medication vs water " flush.     1/21  Pt resting in bed, states she is tolerating current TF regimen. Pt had FEES, recommending NPO. RN states pt started on reglan today and compazine dc'd.     1-20: per RN: pt had nausea vomiting over weekend, TF decreased to trophic rate pt tolerating, plan for FEES today  Keofeed no longer in small bowel has migrated back into stomach per last KUB  Pt resting in bed, on nasal cannula, is alert, oriented, states she has vertigo, and get sick when she is rolled in the bed    1-16: pt intubated, sedated   + fentanyl, precedex, heparin, insulin  Per RN: pt tolerating TF, had small bm last night    1-14-25: pt intubated, sedated, fiancee at bedside  + fentanyl, precedex, insulin  Per RN: TF not restarted yesterday, unclear why, plan for bronch today  Plan to resume TF s/p Bronch    1-10: pt intubated sedated, remains on CRRT  + fentanyl, heparin  Per RN: pt has been off pressors since last night, is less puffy, able to tolerate turning, has not had a bm, had good bowel sound  Per MD: ok to resume TF    1-8: pt intubated, sedated + fentanyl, versed,levophed 0.06mcg, bicarb @75ml/hr  Per RN: pt had 800ml GRV last night TF on hold, is super acidotic, line placed for dialysis, plan to start CRRT     1-6: pt intubated, sedated, fiancee at bedside  + fentanyl, versed, insulin  Per RN:pt s/p emergent bronch Saturday,  trophic feed started yesterday  Per MD ok to advance TF    1-3: Pt intubated, sedated, fiancee at bedside  + insulin, fentanyl, versed, heparin, amio  Per RN: pt had wide complex rhythm last night, lokelma on hold as K+ wnl, 103 temp  Per MD ok to start TF,  place keofeed    Current Nutrition Prescription     PO: Diet: Diabetic, Cardiac; Healthy Heart (2-3 Na+); Consistent Carbohydrate; No Mixed Consistencies, Feeding Assistance - Nursing; Texture: Soft to Chew (NDD 3); Soft to Chew: Chopped Meat; Fluid Consistency: Honey Thick  Oral Nutrition Supplement:  Intake: Insufficient data 50% x 1  meal    Assessment & Plan   Nutrition Diagnosis   Date: 1-3-25  Updated: 1-20-25, 1/27  Problem Inadequate energy intake    Etiology EN apparently supplied   Signs/Symptoms Per documentation 1/22-1/26   Status:  Unk  w inadequate documentation    Date:  1/22 Updated:1/23, 1/27  Problem Inadequate oral intake   Etiology Dysphagia    Signs/Symptoms Oral diet limited to single item/da as of 1/23 Note now adv to texture modified diet    Status: Resolved    Goal:   Nutrition to support treatment and Establish PO       Nutrition Intervention      Follow treatment progress, Care plan reviewed, Encourage intake, Supplement provided    Ordered Magic Cup daily w/dinner  Pt would like Boost/Ensure once liquid consistencies upgraded    Monitoring/Evaluation:   Per protocol, PO intake, Supplement intake, Weight, Skin status, GI status, Symptoms    Stephanie Henderson, MS,RD,LD  Time Spent: 15 min

## 2025-01-29 NOTE — PLAN OF CARE
Goal Outcome Evaluation:  Plan of Care Reviewed With: patient, spouse        Progress: improving  Outcome Evaluation: Pt continues to present below baseline with R sided weakness, decreased strength/activity tolerance, pain, and balance deficits. Max a x2 for STS and side steps to BSC. Pt and spouse educated on UE ther ex. Good demo of learning. Goals updated as appropriate this date. Recommend IPOT POC and IRF at D/C.    Anticipated Discharge Disposition (OT): inpatient rehabilitation facility

## 2025-01-29 NOTE — CASE MANAGEMENT/SOCIAL WORK
Continued Stay Note  [facility]     Patient Name: Natalia Arzate  MRN: [id number]  Today's Date: 1/29/2025    Admit Date: 1/2/2025    Plan: Rehab   Discharge Plan       Row Name 01/29/25 1609       Plan    Plan Rehab    Plan Comments Per discussion in [facility], glucose has been elevated. Insulin was adjusted. Pt is tolerating PO diet and no longer has a rectal tube. She is on room air. I spoke with Pt, in room, to inform her I have sent multiple referrals and most have been declined. With her consent, we are extending the search to [place], [place], [place], and [place]. With her consent, referrals were sent to the following: called to Em with  [place], called to Laurie with [facility] [place] (no Medicaid beds), called to Samara with [facility] (doesn't take managed care Medicaid and has no female beds), faxed to Susanne at [facility] @ [phone], faxed to Evelyn at [facility] @ [phone], faxed to Irene at [facility] @ [phone], called to April with [facility] (not in network with managed care Medicaid), left message for Mariela for [facility], faxed to Ngoc at [facility] @ [phone], faxed to Luz Elena with [facility] @ [phone], faxed to Campos at [facility] @ [phone], left message for Diana at [facility], and for Diana at [facility]. CM will continue to follow.    Final Discharge Disposition Code 03 - skilled nursing facility (SNF)                   Discharge Codes    No documentation.                 Expected Discharge Date and Time       Expected Discharge Date Expected Discharge Time    Jan 30, 2025               Pallavi Donohue RN

## 2025-01-29 NOTE — DISCHARGE PLACEMENT REQUEST
"Steph Gomezgraham Matthews \"Isa\" (39 y.o. Female)    To Admissions  From Pallavi Donohue RN Case Mgr [facility]    [phone]    *Request for short term rehab please     Date of Birth   1985    Social Security Number       Address   [address] [place] [address]    Home Phone   [phone]    MRN   [id number]       Zoroastrian   Mandaeism    Marital Status                               Admission Date   1/2/25    Admission Type   Urgent    Admitting Provider   Soo Casas MD    Attending Provider   Soo Casas MD    Department, Room/Bed   [facility] 6A, N620/1       Discharge Date       Discharge Disposition       Discharge Destination                                 Attending Provider: Soo Casas MD    Allergies: Salvia Officinalis, Shellfish Allergy, Toradol [Ketorolac Tromethamine], Tree Nut, Bleach [Sodium Hypochlorite], Haldol [Haloperidol], Tramadol, Amoxicillin, Penicillins    Isolation: Contact   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), 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)   Code Status: CPR    Ht: 162.6 cm (64\")   Wt: 141 kg (310 lb 13.6 oz)    Admission Cmt: None   Principal Problem: Acute respiratory failure with hypercapnia [J96.02]                   Active Insurance as of 1/2/2025       Primary Coverage       Payor Plan Insurance Group Employer/Plan Group    [place]        Payor Plan Address Payor Plan Phone Number Payor Plan Fax Number Effective Dates    [address]   4/1/2016 - None Entered    [facility] [phone]         Subscriber Name Subscriber Birth Date Member ID       MAGO GOMEZYCE 1985 [id number]                     Emergency Contacts        (Rel.) Home Phone Work Phone Mobile Phone    Faina Gomez (Mother) [phone] -- --    Milan Gomez (Son) [phone] -- [phone]              Insurance Information                  [employer] " Done. Phone: --    Subscriber: Natalia Arzate Subscriber#: [id number]    Group#: -- Precert#: --    Authorization#: [id number] Effective Date: --             History & Physical        Roly Garcia MD at 01/03/25 0947          Pulmonary/Critical Care ICU note     LOS: 1 day   Patient Care Team:  Bladimir Calle PA-C as PCP - General (Physician Assistant)    Chief Complaint: AMS    Subjective    History reviewed and updated in EMR as indicated.    Interval history:    Patient remains on mechanical ventilator.  She is requiring high levels of insulin infusion for blood sugars.  She is sedated.  She has copious secretions.    History taken from: record    Past Medical History/Family History/Social History were reviewed by me and updates were made.     Review of Systems:    Review of 14 systems was completed with positives and pertinent negatives noted in the subjective section.  All other systems reviewed and are negative.         Objective    Vital Signs  Temp:  [98.4 °F (36.9 °C)-103.1 °F (39.5 °C)] 100.2 °F (37.9 °C)  Heart Rate:  [] 100  Resp:  [20-27] 23  BP: ()/(39-85) 128/63  Arterial Line BP: ()/(41-85) 111/50  FiO2 (%):  [60 %-100 %] 60 %  There is no height or weight on file to calculate BMI.  Mode: VC+/AC  FiO2 (%):  [60 %-100 %] 60 %  S RR:  [16-22] 22  S VT:  [385 mL-450 mL] 385 mL  PEEP/CPAP (cm H2O):  [5 cm H20-14 cm H20] 14 cm H20  MAP (cm H2O):  [18-21] 20    Intake/Output Summary (Last 24 hours) at 1/3/2025 0949  Last data filed at 1/3/2025 0848  Gross per 24 hour   Intake 3881.92 ml   Output 3560 ml   Net 321.92 ml      Physical Exam:     General Appearance:    Alert, cooperative, in no acute distress   Head:    Normocephalic, without obvious abnormality, atraumatic   Eyes:            Lids and lashes normal   ENMT:   Ears appear intact with no abnormalities noted    Oral endotracheal tube in place.   Neck:   No adenopathy, supple, trachea midline, no  thyromegaly,      no carotid bruit, no JVD   Lungs/resp:   On ventilator, symmetric chest rise, no crepitus, bilateral rhonchi, no chest wall tenderness                  Heart/CV:    Regular rhythm and normal rate, normal S1 and S2, no         murmur   Abdomen/GI:   Obese, nontender, nondistended   G/U:     Deferred   Extremities/MSK:   No clubbing, cyanosis or edema.  Normal tone.  No deformities.    Pulses:   Pulses palpable and equal bilaterally   Skin:   No bleeding, bruising or rash   Hem/Lymph:   No cervical or supraclavicular adenopathy.    Neurologic:   Moves all extremities with no obvious focal motor deficit.  Cranial nerves 2 - 12 grossly intact            Psychiatric: Sedated.     The above findings are documentation of my personal physical exam findings from today.   Electronically signed by:  Roly Garcia MD  01/03/25  09:49 EST     Results Review:     I reviewed the patient's new clinical results.   Results from last 7 days   Lab Units 01/03/25  0433 01/03/25  0018 01/02/25  2021 01/02/25  1314 01/02/25  0900 01/02/25  0453   SODIUM mmol/L 142  142 143 134*   < > 137 136   POTASSIUM mmol/L 4.2  4.2 4.4 4.7   < > 6.1* 7.0*   CHLORIDE mmol/L 105  105 105 98   < > 100 101   CO2 mmol/L 24.0  24.0 27.0 22.0   < > 22.6 22.3   BUN mg/dL 28*  28* 31* 34*   < > 40* 41*   CREATININE mg/dL 1.29*  1.29* 1.25* 1.38*   < > 2.39* 2.50*   CALCIUM mg/dL 8.8  8.8 9.0 8.7   < > 8.1* 8.4*   BILIRUBIN mg/dL 0.3  --   --   --  0.3 0.4   ALK PHOS U/L 145*  --   --   --  137* 142*   ALT (SGPT) U/L 54*  --   --   --  71* 71*   AST (SGOT) U/L 63*  --   --   --  119* 136*   GLUCOSE mg/dL 147*  147* 168* 234*   < > 315* 314*    < > = values in this interval not displayed.     Results from last 7 days   Lab Units 01/03/25  0433 01/02/25  1314 01/02/25  0900   WBC 10*3/mm3 8.23 9.71 10.48   HEMOGLOBIN g/dL 7.5* 8.5* 8.4*   HEMATOCRIT % 25.2* 28.0* 28.1*   PLATELETS 10*3/mm3 196 222 232     Results from last 7 days    Lab Units 01/03/25  0425   PH, ARTERIAL pH units 7.291*   PO2 ART mm Hg 69.1*   PCO2, ARTERIAL mm Hg 55.6*   HCO3 ART mmol/L 26.8*       I reviewed the patient's new imaging including images and reports.    Chest x-ray 1/3/2025 shows cardiomegaly with left greater than right bilateral pulmonary infiltrates/possible left effusion with endotracheal tube in place and right internal jugular catheter.    CT chest 1/2/2025 shows bibasilar airspace opacities and cardiomegaly.  There is a small left pleural effusion.    CT abdomen and pelvis shows enlarged liver.    Radiologist impression follows:    IMPRESSION:     CT CHEST:  BIBASILAR PROBABLE PNEUMONIA AND ATELECTASIS WITH SMALL LEFT PLEURAL  EFFUSION.     CT ABDOMEN AND PELVIS:  SEVERELY ENLARGED LIVER WITH FATTY INFILTRATION.  STATUS POST LEFT NEPHRECTOMY.    CT head 1/2/2025:    IMPRESSION:     NO ACUTE INTRACRANIAL ABNORMALITY.       Medication Review:   cefepime, 2,000 mg, Intravenous, Q12H  chlorhexidine, 15 mL, Mouth/Throat, Q12H  famotidine, 20 mg, Intravenous, BID  metroNIDAZOLE, 500 mg, Intravenous, Q8H  mupirocin, 1 Application, Each Nare, BID  senna-docusate sodium, 2 tablet, Nasogastric, BID  sodium chloride, 10 mL, Intravenous, Q12H  sodium zirconium cyclosilicate, 10 g, Nasogastric, TID  vancomycin, 1,250 mg, Intravenous, Q12H      amiodarone, 0.5 mg/min, Last Rate: 0.5 mg/min (01/03/25 0021)  fentanyl 10 mcg/mL,  mcg/hr, Last Rate: 100 mcg/hr (01/02/25 1808)  heparin, 17 Units/kg/hr, Last Rate: 17 Units/kg/hr (01/03/25 0620)  insulin, 0-100 Units/hr, Last Rate: 18 Units/hr (01/03/25 0907)  Midazolam 1 mg/mL 100mL NS, 1-10 mg/hr, Last Rate: 10 mg/hr (01/03/25 0155)  norepinephrine, 0.02-0.3 mcg/kg/min, Last Rate: 0.02 mcg/kg/min (01/03/25 6515)  Pharmacy to Dose Heparin,   Pharmacy to dose vancomycin,   sodium chloride, 100 mL/hr, Last Rate: 100 mL/hr (01/03/25 3698)        Assessment & Plan    Active Hospital Problems    Diagnosis     **Acute  respiratory failure with hypercapnia     Hyperkalemia     Sepsis     Respiratory acidosis with metabolic acidosis     Acute respiratory failure     Type 2 diabetes mellitus with hyperglycemia     MARIAA (acute kidney injury)      39 y.o. female with history of HLD, hypothyroidism, atrial fibrillation, history of PE/DVT, factor V Leiden mutation, stroke, presented to [facility] with AMS and difficult to arouse.  Evaluation there showed significant renal failure, hyperglycemia, hypercapnic respiratory failure, and basilar pulmonary infiltrates concerning for pneumonia.  She was intubated for respiratory failure and placed on pressors for hypotension.  She was treated with shifting agents for hyperkalemia.  She was given Narcan without significant response.  She was subsequently transferred to [facility] ICU for ongoing management.    Patient continues to have significant secretions.  She is not yet appropriate for formal ventilator weaning.  Continue antibiotics.    Plan:  1.  For acute mixed hypoxemic and hypercapnic respiratory failure: Continue mechanical ventilation.  Adjust settings to optimize ventilation and oxygenation and minimize barotrauma.  2.  For pulmonary infiltrates/basilar pneumonia unknown organism: Worrisome for aspiration.  Follow-up cultures.  Broad-spectrum antibiotics with cefepime plus Flagyl and vancomycin.  3.  For MARIAA: Improving.  Nephrology following.  Avoid nephrotoxins when possible and renally dose medications when appropriate.  4.  For hyperkalemia: Status post shifting agents.  Improved.  Monitor.  5.  For poorly controlled T2DM: Glucose monitoring and insulin drip protocol.  6.  For sepsis: Broad-spectrum antibiotics as above.  Pressors if needed.  Repeat lactic acid level.  7.  For history of atrial fibrillation/DVT/PE/factor V Leiden: Heparin drip for now but if hemoglobin stable transition to home Eliquis.  Hemoglobin dropped a bit.  8.  For chronic anemia: Monitor.  Transfuse if  necessary.    Patient is critically ill secondary multisystem organ failure and has high risk of life-threatening decline in condition.  As such, she requires continuous monitoring frequent reassessment for consideration of adjustment management in order to minimize that risk.  I personally reassessed her multiple times today.    Critical care time : 38 minutes spent by me personally.(This excludes time spent performing separately reportable procedures and services). Critical care time includes high complexity decision making to assess, manipulate, and support vital organ system failure in this individual who has impairment of one or more vital organ systems such that there is a high probability of imminent or life threatening deterioration in the patient’s condition.    Electronically signed by:  Roly Garcia MD  01/03/25  09:49 EST        Please note that portions of this note were completed with [employer] - a voice recognition program.     Electronically signed by Roly Garcia MD at 01/03/25 1840       Roly Garcia MD at 01/02/25 0832          Pulmonary/Critical Care History and Physical Exam     LOS: 0 days   Patient Care Team:  Bladimir Calle PA-C as PCP - General (Physician Assistant)    Chief Complaint: AMS    Subjective    HPI:     Ms. Arzate is a 39 year old female with a pmhx of Afib, diabetes, h/o PE/DVT, Factor 5 Leiden mutation, GERD, HLD, h/o stroke and hypothyroidism who presented to [facility] with altered mental status and being difficult to arouse per family. Upon arrival to [facility], workup was notable for: negative respiratory panel, leukocytosis (WBC 13.32). ProBNP 5671, hgb 10.2-->8.4, lactate 2.9,  potassium 8.2, creatinine 2.85, BUN 40, glucose 481, and Procal 6.26. CT head without acute process. CT chest with small left pleural effusion, bibasliar consolidation and atelectasis. CT A/P without contrast showed severe enlargement of the liver, s/p left  nephrectomy. Initial ABG showed pH 7.048, pCO2 75, pO2 54, HCO3 21. Patient was intubated. Repeat ABG without significant improvement (pH 7.018, pCO2 79.3, pO2 83.6, HCO3 20.4). Nephrology was consulted and ordered for patient to receive 20 units of Regular insulin IVP x 2 and initiation of an insulin gtt with improvement of potassium to 6.1. Patient also became hypotensive and required Levophed gtt. An UDS was ordered after admission and was positive for opioids. She was given two doses of Narcan on two separate occasions without a significant change in mentation. The decision was made to transfer the patient to our facility for ICU admission and higher level of care.    Time spent: 8 minutes. Electronically signed by YEIMI Aargon, 25, 11:12 AM EST.    Patient seen on arrival to the ICU.  She is unresponsive on mechanical ventilation and is unable to relay any history.  History per outside records as above.    History taken from: record    Past Medical History:   Diagnosis Date    A-fib     Abnormal ECG     Anemia     Anxiety     Asthma     Cancer     Ovarian    Depression     Diabetes mellitus     DVT (deep venous thrombosis)     Factor 5 Leiden mutation, heterozygous     Fibroid     GERD (gastroesophageal reflux disease)     Gout     H/O abdominal abscess     History of sepsis     History of transfusion     Hyperlipidemia     Hypothyroid     Kidney stone     Migraines     Neuropathy     Ovarian cancer 2021    Ovarian cyst     PE (pulmonary embolism)     Polycystic ovary syndrome     Preeclampsia     Rh incompatibility     Stroke     TIA (transient ischemic attack)     Urinary tract infection     Varicella        Past Surgical History:   Procedure Laterality Date    ABDOMINAL SURGERY      CARDIAC CATHETERIZATION       SECTION      CHOLECYSTECTOMY      COLONOSCOPY      ENDOSCOPY      EXTRACORPOREAL SHOCK WAVE LITHOTRIPSY (ESWL) Left 10/22/2021    Procedure: EXTRACORPOREAL SHOCKWAVE  LITHOTRIPSY;  Surgeon: Milan Motley MD;  Location: [facility] OR;  Service: Urology;  Laterality: Left;    HYSTERECTOMY  2017    ovarian ca    LITHOTRIPSY      NEPHRECTOMY Left 10/2022    RIGHT OOPHORECTOMY      URETEROSCOPY LASER LITHOTRIPSY WITH STENT INSERTION Left 10/01/2021    Procedure: URETEROSCOPY WITH STENT PLACEMENT;  Surgeon: Milan Motley MD;  Location: [facility] OR;  Service: Urology;  Laterality: Left;    URETEROSCOPY LASER LITHOTRIPSY WITH STENT INSERTION Left 04/27/2022    Procedure: URETEROSCOPY STENT REMOVAL;  Surgeon: Milan Motley MD;  Location: [facility] OR;  Service: Urology;  Laterality: Left;    URETEROSCOPY LASER LITHOTRIPSY WITH STENT INSERTION Left 06/29/2022    Procedure: CYSTOSCOPY RETROGRADE LEFT WITH STENT PLACEMENT;  Surgeon: Milan Motley MD;  Location: [facility];  Service: Urology;  Laterality: Left;       Family History   Problem Relation Age of Onset    Hypertension Mother     Diabetes Father     Hypertension Father     Heart attack Father     No Known Problems Sister     No Known Problems Brother     No Known Problems Daughter     No Known Problems Son     No Known Problems Maternal Grandmother     No Known Problems Paternal Grandmother     Breast cancer Paternal Aunt     No Known Problems Maternal Grandfather     No Known Problems Paternal Grandfather     No Known Problems Cousin     Rheum arthritis Neg Hx     Osteoarthritis Neg Hx     Asthma Neg Hx     Heart failure Neg Hx     Hyperlipidemia Neg Hx     Migraines Neg Hx     Rashes / Skin problems Neg Hx     Seizures Neg Hx     Stroke Neg Hx     Thyroid disease Neg Hx        Social History     Socioeconomic History    Marital status:    Tobacco Use    Smoking status: Never     Passive exposure: Never    Smokeless tobacco: Never   Vaping Use    Vaping status: Never Used   Substance and Sexual Activity    Alcohol use: No    Drug use: Never     Comment: Pt has track marks on right arm. Pt  "states \"It's from my INR being drawn.\"     Sexual activity: Not Currently     Partners: Male     Birth control/protection: None       Allergies   Allergen Reactions    Salvia Officinalis Itching, Other (See Comments) and Shortness Of Breath    Shellfish Allergy Anaphylaxis    Toradol [Ketorolac Tromethamine] Anaphylaxis and Hives    Tree Nut Anaphylaxis and Shortness Of Breath     WALNUTS    Haldol [Haloperidol] Hives and Mental Status Change    Tramadol Hives and Swelling    Amoxicillin Hives and Rash    Penicillins Hives and Rash       Past Medical History/Family History/Social History were reviewed by me and updates were made.     Review of Systems:    Review of 14 systems was completed with positives and pertinent negatives noted in the subjective section.  All other systems reviewed and are negative.         Objective    Vital Signs  Temp:  [98.2 °F (36.8 °C)-104 °F (40 °C)] 100.1 °F (37.8 °C)  Heart Rate:  [] 108  Resp:  [20-35] 22  BP: ()/(38-88) 121/51  Arterial Line BP: (101-149)/(52-85) 103/63  FiO2 (%):  [70 %-100 %] 70 %  There is no height or weight on file to calculate BMI.  Mode: VC+/AC  FiO2 (%):  [70 %-100 %] 70 %  S RR:  [16-26] 22  S VT:  [350 mL-450 mL] 385 mL  PEEP/CPAP (cm H2O):  [5 cm H20-14 cm H20] 14 cm H20  MAP (cm H2O):  [10-20] 18    Intake/Output Summary (Last 24 hours) at 1/2/2025 1953  Last data filed at 1/2/2025 1808  Gross per 24 hour   Intake 1017.42 ml   Output 1700 ml   Net -682.58 ml      Physical Exam:     General Appearance:  Sedated on ventilator, in no acute distress   Head:    Normocephalic, without obvious abnormality, atraumatic   Eyes:            Lids and lashes normal, conjunctivae and sclerae normal,    no icterus, no pallor, corneas clear, PERRL   ENMT:   Ears appear intact with no abnormalities noted    Oral endotracheal tube in place   Neck:   No adenopathy, supple, trachea midline, no thyromegaly,      no carotid bruit, no JVD   Lungs/resp:   On " ventilator, symmetric chest rise, no crepitus, bilateral rhonchi, no chest wall tenderness                  Heart/CV:    Regular rhythm and normal rate, normal S1 and S2, no         murmur   Abdomen/GI:   Obese, soft, nontender, nondistended   G/U:     Deferred   Extremities/MSK:   No clubbing, cyanosis or edema.   No deformities.    Pulses:   Pulses palpable and equal bilaterally   Skin:   No bleeding, bruising or rash   Hem/Lymph:   No cervical or supraclavicular adenopathy.    Neurologic: Sedated.            Psychiatric: Sedated.     The above findings are documentation of my personal physical exam findings from today.   Electronically signed by:  Roly Garcia MD  01/02/25  19:53 EST     Results Review:     I reviewed the patient's new clinical results.   Results from last 7 days   Lab Units 01/02/25  1538 01/02/25  1314 01/02/25  0900 01/02/25  0453 01/01/25  2333 01/01/25  2145   SODIUM mmol/L 137 136 137 136   < > 133*   POTASSIUM mmol/L 5.7* 6.1* 6.1* 7.0*   < > 8.2*   CHLORIDE mmol/L 100 100 100 101   < > 98   CO2 mmol/L 22.0 21.0* 22.6 22.3   < > 19.5*   BUN mg/dL 37* 39* 40* 41*   < > 40*   CREATININE mg/dL 1.61* 1.69* 2.39* 2.50*   < > 2.85*   CALCIUM mg/dL 8.7 8.8 8.1* 8.4*   < > 8.3*   BILIRUBIN mg/dL  --   --  0.3 0.4  --  0.6   ALK PHOS U/L  --   --  137* 142*  --  129*   ALT (SGPT) U/L  --   --  71* 71*  --  60*   AST (SGOT) U/L  --   --  119* 136*  --  120*   GLUCOSE mg/dL 373* 377* 315* 314*   < > 481*    < > = values in this interval not displayed.     Results from last 7 days   Lab Units 01/02/25  1314 01/02/25  0900 01/01/25  2039   WBC 10*3/mm3 9.71 10.48 13.32*   HEMOGLOBIN g/dL 8.5* 8.4* 9.3*   HEMATOCRIT % 28.0* 28.1* 31.2*   PLATELETS 10*3/mm3 222 232 252     Results from last 7 days   Lab Units 01/02/25  1514   PH, ARTERIAL pH units 7.221*   PO2 ART mm Hg 98.1   PCO2, ARTERIAL mm Hg 59.8*   HCO3 ART mmol/L 24.5       I reviewed the patient's new imaging including images and  reports.    CT chest 1/2/2025 shows bibasilar airspace opacities and cardiomegaly.  There is a small left pleural effusion.    CT abdomen and pelvis shows enlarged liver.    Radiologist impression follows:    IMPRESSION:     CT CHEST:  BIBASILAR PROBABLE PNEUMONIA AND ATELECTASIS WITH SMALL LEFT PLEURAL  EFFUSION.     CT ABDOMEN AND PELVIS:  SEVERELY ENLARGED LIVER WITH FATTY INFILTRATION.  STATUS POST LEFT NEPHRECTOMY.    CT head 1/2/2025:    IMPRESSION:     NO ACUTE INTRACRANIAL ABNORMALITY.       Medication Review:   albumin human, 25 g, Intravenous, BID  cefepime, 2,000 mg, Intravenous, Q12H  chlorhexidine, 15 mL, Mouth/Throat, Q12H  famotidine, 20 mg, Intravenous, BID  metroNIDAZOLE, 500 mg, Intravenous, Q8H  mupirocin, 1 Application, Each Nare, BID  senna-docusate sodium, 2 tablet, Nasogastric, BID  sodium chloride, 10 mL, Intravenous, Q12H  sodium zirconium cyclosilicate, 10 g, Nasogastric, TID  [START ON 1/3/2025] vancomycin (dosing per levels), , Not Applicable, Daily      amiodarone, 1 mg/min, Last Rate: 1 mg/min (01/02/25 1731)   Followed by  amiodarone, 0.5 mg/min  fentanyl 10 mcg/mL,  mcg/hr, Last Rate: 100 mcg/hr (01/02/25 1808)  heparin, 9 Units/kg/hr, Last Rate: 9 Units/kg/hr (01/02/25 1543)  insulin, 0-100 Units/hr, Last Rate: 11 Units/hr (01/02/25 1904)  Midazolam 1 mg/mL 100mL NS, 1-10 mg/hr, Last Rate: 10 mg/hr (01/02/25 1705)  norepinephrine, 0.02-0.3 mcg/kg/min, Last Rate: 0.06 mcg/kg/min (01/02/25 1730)  Pharmacy to Dose Heparin,   Pharmacy to dose vancomycin,         Assessment & Plan    Active Hospital Problems    Diagnosis     **Acute respiratory failure with hypercapnia     Hyperkalemia     Sepsis     Respiratory acidosis with metabolic acidosis     Acute respiratory failure     Type 2 diabetes mellitus with hyperglycemia     MARIAA (acute kidney injury)      39 y.o. female with history of HLD, hypothyroidism, atrial fibrillation, history of PE/DVT, factor V Leiden mutation, stroke,  presented to  [facility] with AMS and difficult to arouse.  Evaluation there showed significant renal failure, hyperglycemia, hypercapnic respiratory failure, and basilar pulmonary infiltrates concerning for pneumonia.  She was intubated for respiratory failure and placed on pressors for hypotension.  She was treated with shifting agents for hyperkalemia.  She was given Narcan without significant response.  She was subsequently transferred to [facility] ICU for ongoing management.    Plan:  1.  For acute mixed hypoxemic and hypercapnic respiratory failure: Continue mechanical ventilation.  Adjust settings to optimize ventilation and oxygenation and minimize barotrauma.  2.  For pulmonary infiltrates/basilar pneumonia unknown organism: Worrisome for aspiration.  Follow-up cultures.  Broad-spectrum antibiotics with cefepime plus Flagyl and vancomycin.  3.  For MARIAA: Consult nephrology.  Fluid resuscitation.  May need HD.  4.  For hyperkalemia: Status post shifting agents.  Monitor with resuscitation.  5.  For poorly controlled T2DM: Glucose monitoring and insulin drip protocol.  6.  For sepsis: Broad-spectrum antibiotics as above.  Pressors if needed.  Repeat lactic acid level.  7.  For history of atrial fibrillation/DVT/PE/factor V Leiden: Heparin drip for now but if hemoglobin stable transition to home Eliquis.  8.  For chronic anemia: Monitor.  Transfuse if necessary.    Patient is critically ill secondary multisystem organ failure and has high risk of life-threatening decline in condition.  As such, she requires continuous monitoring frequent reassessment for consideration of adjustment management in order to minimize that risk.  I personally reassessed her multiple times today.    Critical care time : 44 minutes spent by me personally.(This excludes time spent performing separately reportable procedures and services). Critical care time includes high complexity decision making to assess, manipulate, and support vital organ  system failure in this individual who has impairment of one or more vital organ systems such that there is a high probability of imminent or life threatening deterioration in the patient’s condition.    Electronically signed by:  Roly Garcia MD  01/02/25  19:53 EST        Please note that portions of this note were completed with [employer] - a voice recognition program.     Electronically signed by Roly Garcia MD at 01/03/25 1843       Current Facility-Administered Medications   Medication Dose Route Frequency Provider Last Rate Last Admin    acetaminophen (TYLENOL) 160 MG/5ML oral solution 650 mg  650 mg Oral Q6H PRN Otilia Quinteros RPH        amitriptyline (ELAVIL) tablet 25 mg  25 mg Oral Nightly Otilia Quinteros RPH   25 mg at 01/28/25 2059    amLODIPine (NORVASC) tablet 10 mg  10 mg Oral Daily Otilia Quinteros RPH   10 mg at 01/29/25 0904    apixaban (ELIQUIS) tablet 5 mg  5 mg Oral Q12H Soo Casas MD        cholestyramine light packet 4 g  1 packet Oral Q8H Soo Casas MD   4 g at 01/29/25 1350    collagenase ointment 1 Application  1 Application Topical Q24H Soo Casas MD   1 Application at 01/29/25 0906    dextrose (D50W) (25 g/50 mL) IV injection 25 g  25 g Intravenous Q15 Min PRN Otilia Quinteros RPH        dextrose (GLUTOSE) oral gel 15 g  15 g Oral Q15 Min PRN Otilia Quinteros RPH        DULoxetine (CYMBALTA) DR capsule 30 mg  30 mg Oral Nightly Diana Ghotra MD   30 mg at 01/28/25 2059    DULoxetine (CYMBALTA) DR capsule 60 mg  60 mg Oral Daily Diana Ghotra MD   60 mg at 01/29/25 0904    gabapentin (NEURONTIN) capsule 300 mg  300 mg Oral Q8H Otilia Quinteros RPH   300 mg at 01/29/25 1350    glucagon (GLUCAGEN) injection 1 mg  1 mg Intramuscular Q15 Min PRN Otilia Quinteros RPH        HYDROcodone-acetaminophen (NORCO) 5-325 MG per tablet 1 tablet  1 tablet Oral Q4H PRN Otilia Quinteros RPH   1 tablet at  01/29/25 1226    HYDROmorphone (DILAUDID) injection 0.5 mg  0.5 mg Intravenous Q4H PRN Soo Casas MD   0.5 mg at 01/29/25 1350    hydrOXYzine (ATARAX) tablet 25 mg  25 mg Oral Nightly Kishoron LynneYEIMI Hart   25 mg at 01/28/25 2059    insulin glargine (LANTUS, SEMGLEE) injection 50 Units  50 Units Subcutaneous Q12H Soo Casas MD        Insulin Lispro (humaLOG) injection 20 Units  20 Units Subcutaneous TID With Meals Soo Casas MD   20 Units at 01/29/25 1230    Insulin Lispro (humaLOG) injection 4-24 Units  4-24 Units Subcutaneous 4x Daily AC & at Bedtime Otilia Quinteros RPH   12 Units at 01/29/25 1230    ipratropium (ATROVENT) nebulizer solution 0.5 mg  0.5 mg Nebulization Q4H PRN Avelino, Tiffanie CABRERA, DO        ipratropium-albuterol (DUO-NEB) nebulizer solution 3 mL  3 mL Nebulization Q4H PRN Tiffanie Yuo, DO   3 mL at 01/24/25 1440    lactobacillus acidophilus (RISAQUAD) capsule 1 capsule  1 capsule Oral Daily Otilia Quinteros [facility]   1 capsule at 01/29/25 0904    loperamide (IMODIUM) capsule 2 mg  2 mg Oral Q4H PRN Soo Casas MD        Magnesium Standard Dose Replacement - Follow Nurse / BPA Driven Protocol   Not Applicable PRN Case, Tiffanie VJaya, DO        meclizine (ANTIVERT) tablet 25 mg  25 mg Nasogastric TID PRN Diana Ghotra MD   25 mg at 01/22/25 1251    melatonin tablet 5 mg  5 mg Oral Nightly PRN Izzy Wells PA-C   5 mg at 01/28/25 2059    metoclopramide (REGLAN) injection 10 mg  10 mg Intravenous Q6H Diana Ghotra MD   10 mg at 01/29/25 1035    metoprolol succinate XL (TOPROL-XL) 24 hr tablet 50 mg  50 mg Oral BID Diana Ghotra MD   50 mg at 01/29/25 0908    midazolam (VERSED) injection 2 mg  2 mg Intravenous Q1H PRN Case, Tiffanie V., DO   2 mg at 01/24/25 0434    Nutrisource fiber pack 1 packet  1 packet Per G Tube TID Ritika Mendez RD        nystatin (MYCOSTATIN) powder   Topical Q12H Case, Tiffanie CARROLL., DO    Given at 25 0905    ondansetron (ZOFRAN) injection 4 mg  4 mg Intravenous Q6H PRN Case, Tiffanie V., DO   4 mg at 25 1808    oxyCODONE (ROXICODONE) 5 MG/5ML solution 5 mg  5 mg Oral Q4H PRN Soo Casas MD   5 mg at 25 0040    Polyvinyl Alcohol-Povidone PF (ARTIFICIAL TEARS) 1.4-0.6 % ophthalmic solution 2 drop  2 drop Both Eyes Q2H PRN Case, Tiffanie V., DO        Potassium Replacement - Follow Nurse / BPA Driven Protocol   Not Applicable PRN Case, Tiffanie V., DO        Psyllium (METAMUCIL MULTIHEALTH FIBER) 51.7 % packet 1 packet  1 packet Oral BID Soo Casas MD   1 packet at 25    sodium chloride 0.9 % flush 10 mL  10 mL Intravenous PRN Case, Tiffanie V., DO   10 mL at 25 0950    sodium chloride 0.9 % flush 20 mL  20 mL Intravenous PRN Case, Tiffanie V., DO            Physician Progress Notes (most recent note)        Soo Casas MD at 25 1415              [facility] Medicine Services  PROGRESS NOTE    Patient Name: Natalia Arzate  : 1985  MRN: [id number]    Date of Admission: 2025  Primary Care Physician: Bladimir Calle PA-C    Subjective   Subjective     CC:  Resp failure    HPI:  Patient seen and examined this morning.  Comfortable in bed.  Eating by mouth now and happy about it.  Keofeed discontinued.  Diarrhea improved.  Currently awaiting rehab.    Objective   Objective     Vital Signs:   Temp:  [97 °F (36.1 °C)-98.4 °F (36.9 °C)] 97.6°F (36.9 °C)  Heart Rate:  [] 91  Resp:  [18] 18  BP: (127-132)/() 127/81     Physical Exam:  General: morbidly obese , not in distress, conversant and cooperative  Head: Atraumatic and normocephalic  Eyes: No Icterus. No pallor  Ears:  Ears appear intact with no abnormalities noted  Throat: No oral lesions, no thrush  Neck: Supple, trachea midline  Lungs: Clear to auscultation bilaterally, equal air entry, no wheezing or crackles  Heart:  Normal S1  and S2, no murmur, no gallop, No JVD, 2+ lower extremity swelling  Abdomen:  Soft, no tenderness, no organomegaly, normal bowel sounds, no organomegaly  Extremities: pulses equal bilaterally  Skin: No bleeding, bruising or rash, normal skin turgor and elasticity  Neurologic: Cranial nerves appear intact with no evidence of facial asymmetry, normal motor and sensory functions in all 4 extremities  Psych: Alert and oriented x 3, normal mood     Results Reviewed:  LAB RESULTS:      Lab 01/28/25  1603 01/27/25  0554 01/26/25  0702 01/26/25  0552 01/26/25  0224 01/24/25  1603   WBC  --  5.58  --   --  6.05  --    HEMOGLOBIN  --  9.0*  --   --  9.7*  --    HEMATOCRIT  --  27.6*  --   --  30.1*  --    PLATELETS  --  167  --   --  188  --    NEUTROS ABS  --  3.18  --   --  3.57  --    IMMATURE GRANS (ABS)  --  0.02  --   --  0.01  --    LYMPHS ABS  --  1.47  --   --  1.48  --    MONOS ABS  --  0.52  --   --  0.57  --    EOS ABS  --  0.33  --   --  0.38  --    MCV  --  90.2  --   --  90.7  --    CRP  --   --   --   --  3.58* 2.18*   HEPARIN ANTI-XA 0.90  --   --   --   --   --    HSTROP T  --   --  36* 34*  --   --          Lab 01/27/25  0554 01/26/25  0224 01/25/25  0627 01/24/25  1603 01/23/25  0453   SODIUM 131* 126*  --  132*  --    POTASSIUM 4.7 4.1  --  4.5  --    CHLORIDE 91* 86*  --  90*  --    CO2 24.0 27.0  --  28.0  --    ANION GAP 16.0* 13.0  --  14.0  --    BUN 36* 28*  --  22*  --    CREATININE 0.63 0.58  --  0.52*  --    EGFR 116.6 119.0  --  122.1  --    GLUCOSE 235* 241*  --  231*  --    CALCIUM 10.8* 10.8*  --  10.4  --    MAGNESIUM 1.6 1.2* 1.8 1.2* 2.3   PHOSPHORUS  --  4.5  --  5.0*  --          Lab 01/26/25  0224 01/24/25  1603   TOTAL PROTEIN 8.1 8.1   ALBUMIN 4.1 3.9   GLOBULIN 4.0 4.2   ALT (SGPT) 38* 35*   AST (SGOT) 64* 70*   BILIRUBIN 0.6 0.5   ALK PHOS 140* 134*         Lab 01/26/25  0702 01/26/25  0552   HSTROP T 36* 34*         Lab 01/24/25  1603   CHOLESTEROL 264*   TRIGLYCERIDES 739*                  Brief Urine Lab Results  (Last result in the past 365 days)        Color   Clarity   Blood   Leuk Est   Nitrite   Protein   CREAT   Urine HCG        01/02/25 1213             52.3         01/02/25 1213 Yellow   Cloudy   Moderate (2+)   Negative   Negative   100 mg/dL (2+)                   Microbiology Results Abnormal       Procedure Component Value - Date/Time    Respiratory Culture - Wash, Lung, Left Lower Lobe [[id number]]  (Abnormal)  (Susceptibility) Collected: 01/14/25 1608    Lab Status: Final result Specimen: Wash from Lung, Left Lower Lobe Updated: 01/17/25 1221     Respiratory Culture Light growth (2+) Klebsiella pneumoniae ESBL     Comment:   Consider infectious disease consult.  Susceptibility results may not correlate to clinical outcomes.         Light growth (2+) Staphylococcus aureus, MRSA     Comment:   Considering site of infection and appropriate dosing, oxacillin (or cefoxitin) results can be applied to cefazolin, nafcillin, ampicillin/sulbactam, dicloxacillin, amoxicillin/clavulanate, cephalexin, and cefpodoxime.  Methicillin resistant Staphylococcus aureus, Patient may be an isolation risk.         Rare growth Normal Respiratory Margo     Gram Stain Many (4+) WBCs per low power field      Rare (1+) Epithelial cells per low power field      Rare (1+) Gram positive cocci in pairs and clusters    Susceptibility        Klebsiella pneumoniae ESBL      KELSIE      Ciprofloxacin Resistant      Ertapenem Susceptible      Levofloxacin Resistant      Meropenem Susceptible      Tetracycline Resistant      Trimethoprim + Sulfamethoxazole Resistant                       Susceptibility        Staphylococcus aureus, MRSA      KELSIE      Clindamycin Resistant      Linezolid Susceptible      Oxacillin Resistant      Tetracycline Susceptible      Trimethoprim + Sulfamethoxazole Susceptible      Vancomycin Susceptible                       Susceptibility Comments       Klebsiella pneumoniae ESBL    With the  exception of urinary-sourced infections, aminoglycosides should not be used as monotherapy.      Staphylococcus aureus, MRSA    This isolate is presumed to be clindamycin resistant based on detection of inducible clindamycin resistance.  Clindamycin may still be effective in some patients.  This isolate is presumed to be clindamycin resistant based on detection of inducible clindamycin resistance.  Clindamycin may still be effective in some patients.               Respiratory Culture - Sputum, ET Suction [[id number]]  (Abnormal) Collected: 01/14/25 1857    Lab Status: Final result Specimen: Sputum from ET Suction Updated: 01/17/25 1221     Respiratory Culture Light growth (2+) Klebsiella pneumoniae ESBL     Comment:   Consider infectious disease consult.  Susceptibility results may not correlate to clinical outcomes.         Light growth (2+) Staphylococcus aureus, MRSA     Comment:   Considering site of infection and appropriate dosing, oxacillin (or cefoxitin) results can be applied to cefazolin, nafcillin, ampicillin/sulbactam, dicloxacillin, amoxicillin/clavulanate, cephalexin, and cefpodoxime.  Methicillin resistant Staphylococcus aureus, Patient may be an isolation risk.        Gram Stain Many (4+) WBCs per low power field      Rare (1+) Epithelial cells per low power field      Few (2+) Gram positive cocci in pairs, chains and clusters    Narrative:      Refer to LLL Wash culture for MICS.     Blood Culture - Blood, Blood, Arterial Line [[id number]]  (Abnormal) Collected: 01/14/25 1434    Lab Status: Final result Specimen: Blood, Arterial Line Updated: 01/17/25 0636     Blood Culture Staphylococcus, coagulase negative     Isolated from Anaerobic Bottle     Gram Stain Anaerobic Bottle Gram positive cocci in pairs and clusters    Narrative:      Probable contaminant requires clinical correlation, susceptibility not performed unless requested by physician.      Blood Culture ID, PCR - Blood, Blood, Arterial Line  [[id number]]  (Abnormal) Collected: 01/14/25 1434    Lab Status: Final result Specimen: Blood, Arterial Line Updated: 01/16/25 0657     BCID, PCR Staph spp, not aureus or lugdunensis. Identification by BCID2 PCR.     BOTTLE TYPE Anaerobic Bottle    Blood Culture - Blood, Arm, Left [[id number]]  (Abnormal) Collected: 01/05/25 1305    Lab Status: Final result Specimen: Blood from Arm, Left Updated: 01/07/25 1100     Blood Culture Staphylococcus, coagulase negative     Isolated from Anaerobic Bottle     Gram Stain Anaerobic Bottle Gram positive cocci in clusters    Narrative:      Less than seven (7) mL's of blood was collected.  Insufficient quantity may yield false negative results.  Probable contaminant requires clinical correlation, susceptibility not performed unless requested by physician.    Blood Culture ID, PCR - Blood, Arm, Left [[id number]]  (Abnormal) Collected: 01/05/25 1305    Lab Status: Final result Specimen: Blood from Arm, Left Updated: 01/06/25 1435     BCID, PCR Staph spp, not aureus or lugdunensis. Identification by BCID2 PCR.     BOTTLE TYPE Anaerobic Bottle    Respiratory Culture - Bronchial Wash, Lung, Left Lower Lobe [[id number]]  (Abnormal)  (Susceptibility) Collected: 01/04/25 0755    Lab Status: Final result Specimen: Bronchial Wash from Lung, Left Lower Lobe Updated: 01/06/25 0908     Respiratory Culture Scant growth (1+) Staphylococcus aureus, MRSA     Comment:   Methicillin resistant Staphylococcus aureus, Patient may be an isolation risk.         No Normal Respiratory Margo     Gram Stain Moderate (3+) WBCs seen      Rare (1+) Gram positive cocci in pairs and clusters    Susceptibility        Staphylococcus aureus, MRSA      KELSIE      Clindamycin Resistant      Linezolid Susceptible      Oxacillin Resistant      Tetracycline Susceptible      Trimethoprim + Sulfamethoxazole Susceptible      Vancomycin Susceptible                       Susceptibility Comments       Staphylococcus aureus, MRSA     This isolate is presumed to be clindamycin resistant based on detection of inducible clindamycin resistance.  Clindamycin may still be effective in some patients.  This isolate is presumed to be clindamycin resistant based on detection of inducible clindamycin resistance.  Clindamycin may still be effective in some patients.               Respiratory Culture - Sputum, ET Suction [[id number]]  (Abnormal)  (Susceptibility) Collected: 01/02/25 1212    Lab Status: Final result Specimen: Sputum from ET Suction Updated: 01/04/25 0939     Respiratory Culture Moderate growth (3+) Staphylococcus aureus, MRSA      No Normal Respiratory Margo     Gram Stain Many (4+) WBCs per low power field      Rare (1+) Epithelial cells per low power field      Many (4+) Gram positive cocci in pairs and clusters    Susceptibility        Staphylococcus aureus, MRSA      KELSIE      Clindamycin Resistant      Linezolid Susceptible      Oxacillin Resistant      Tetracycline Susceptible      Trimethoprim + Sulfamethoxazole Susceptible      Vancomycin Susceptible                       Susceptibility Comments       Staphylococcus aureus, MRSA    This isolate is presumed to be clindamycin resistant based on detection of inducible clindamycin resistance.  Clindamycin may still be effective in some patients.               MRSA Screen, PCR (Inpatient) - Swab, Nares [[id number]]  (Abnormal) Collected: 01/02/25 1246    Lab Status: Final result Specimen: Swab from Nares Updated: 01/02/25 1504     MRSA PCR Positive    Narrative:      The negative predictive value of this diagnostic test is high and should only be used to consider de-escalating anti-MRSA therapy. A positive result may indicate colonization with MRSA and must be correlated clinically.            SLP FEES - Fiberoptic Endo Eval Swallow    Result Date: 1/27/2025  This procedure was auto-finalized with no dictation required.     Results for orders placed during the hospital encounter of  01/02/25    Adult Transthoracic Echo Complete W/ Cont if Necessary Per Protocol    Interpretation Summary    Left ventricular systolic function is normal. Calculated left ventricular EF = 59.3% Left ventricular ejection fraction appears to be 61 - 65%.    Left ventricular wall thickness is consistent with borderline concentric hypertrophy.    Left ventricular diastolic function was normal.    Mild aortic valve stenosis is present.    Peak velocity of the flow distal to the aortic valve is 292 cm/s. Aortic valve mean pressure gradient is 20 mmHg.    Estimated right ventricular systolic pressure from tricuspid regurgitation is normal (<35 mmHg).      Current medications:  Scheduled Meds:amitriptyline, 25 mg, Oral, Nightly  amLODIPine, 10 mg, Oral, Daily  cholestyramine light, 1 packet, Oral, Q8H  collagenase, 1 Application, Topical, Q24H  DULoxetine, 30 mg, Oral, Nightly  DULoxetine, 60 mg, Oral, Daily  enoxaparin, 120 mg, Subcutaneous, Q12H  gabapentin, 300 mg, Oral, Q8H  hydrOXYzine, 25 mg, Oral, Nightly  insulin glargine, 50 Units, Subcutaneous, Q12H  Insulin Lispro, 20 Units, Subcutaneous, TID With Meals  insulin lispro, 4-24 Units, Subcutaneous, 4x Daily AC & at Bedtime  lactobacillus acidophilus, 1 capsule, Oral, Daily  metoclopramide, 10 mg, Intravenous, Q6H  metoprolol succinate XL, 50 mg, Oral, BID  Nutrisource fiber, 1 packet, Per G Tube, TID  nystatin, , Topical, Q12H  Psyllium, 1 packet, Oral, BID  [Held by provider] torsemide, 40 mg, Oral, Daily      Continuous Infusions:   PRN Meds:.  acetaminophen    dextrose    dextrose    glucagon (human recombinant)    HYDROcodone-acetaminophen    HYDROmorphone    ipratropium    ipratropium-albuterol    loperamide    Magnesium Standard Dose Replacement - Follow Nurse / BPA Driven Protocol    meclizine    melatonin    midazolam    ondansetron    oxyCODONE    Polyvinyl Alcohol-Povidone PF    Potassium Replacement - Follow Nurse / BPA Driven Protocol    sodium  chloride    sodium chloride    Assessment & Plan   Assessment & Plan     Active Hospital Problems    Diagnosis  POA    **Acute respiratory failure with hypercapnia [J96.02]  Yes    Sepsis [A41.9]  Yes    Type 2 diabetes mellitus with hyperglycemia [E11.65]  Yes    Hypothyroid [E03.9]  Yes    Factor 5 Leiden mutation, heterozygous [D68.51]  Yes    MARIAA (acute kidney injury) [N17.9]  Yes    Primary hypertension [I10]  Yes    PTSD (post-traumatic stress disorder) [F43.10]  Yes    History of DVT in adulthood [Z86.718]  Not Applicable      Resolved Hospital Problems   No resolved problems to display.        Brief Hospital Course to date:  Natalia Arzate is a 39 y.o. female with a history of HLD, Hypothyroidism, Afib, PE/DVT, Factor V Leiden mutation, and stroke who presented to [facility] with altered mental status. Labs there were significant for renal failure, hyperglycemia, and hypercapnic respiratory failure. Dx with  pneumonia and resp failure.  She was intubated and required the initiation of vasopressors. She was transferred to [facility] on 1/2/25 for ongoing care.    Acute hypoxic resp failure, improving  Severe sepsis with end organ failure- renal failure and resp failure, improved  MRSA PNA--s/p vanc/linezolid  ESBL Klebsiella PNA  S/p intubation, extubated from 1/1/2025 till 1/17/2025  Pt underwent bronch on 1/14 with significant mucus plugging, post CXR with improvement in L lung aeration.  S/P merrem, vancomycin and linezolid for ESBL pneumonia and MRSA pneumonia respectively  Currently on room air--cont pulmonary toilet, oxygen as needed.    Gemella sanguinis Bacteremia  1 bottle Gemella sanguinis--unclear significance  S/p 10 days amp/unasyn.    Hypoosmolar hyponatremia  Secondary to ongoing diuretic therapy with Bumex and diarrhea  Holding Bumex, getting IV albumin and monitor BMP   Sodium improved today.  Monitor.    MARIAA due to severe sepsis  S/P Lt nephrectomy in 2022  Pt required CRRT ~ 1/8/25-1/11/25   Renal  function improving, no need for further HD  Monitor labs    Severe dysphagia  Keofeed discontinued yesterday.  Patient placed on modified diet.    Continue Zofran, reglan.  Trial of meclizine, as having some vertigo sx. slight improvement reported  Speech therapy following a modified diet.    Right arm pain  X-rays to forearm and humerus neg for fracture  PRN dilaudid IV  Pt is not able to fit in MRI  Doppler ultrasound right upper extremity with no evidence of DVT    Diarrhea, improved  That is likely secondary to tube feeds.  Now resolved after patient started eating by mouth.  Prior C diff neg  Continue probiotic, as needed Imodium and cholestyramine    Factor V Leiden mutation  History of PE/DVT  History of stroke  Continue home lovenox    DM  A1c 9.1%  Continue insulin Lantus, Premeal insulin and SSI    Hypertension  History A-fib  Continue metoprolol, norvasc  Switch Lovenox to Eliquis    History of PTSD  Continue home cymbalta and elavil    Expected Discharge Location and Transportation: SNF  Expected Discharge   Expected Discharge Date: 2025; Expected Discharge Time:      VTE Prophylaxis:  Pharmacologic & mechanical VTE prophylaxis orders are present.         AM-PAC 6 Clicks Score (PT): 12 (25 0830)    CODE STATUS:   Code Status and Medical Interventions: CPR (Attempt to Resuscitate); Full Support   Ordered at: 25 1952     Code Status (Patient has no pulse and is not breathing):    CPR (Attempt to Resuscitate)     Medical Interventions (Patient has pulse or is breathing):    Full Support       Soo Casas MD  25        Electronically signed by Soo Casas MD at 25 1420          Physical Therapy Notes (most recent note)        Ted Carbone, PT at 25 1541  Version 1 of 1         Acute Care - Wound/Debridement Treatment Note   Kady     Patient Name: Natalia Arzate  : 1985  MRN: [id number]  Today's Date: 2025                 Admit Date: 1/2/2025    Visit Dx:    ICD-10-CM ICD-9-CM   1. Respiratory acidosis with metabolic acidosis  E87.4 276.3   2. Acute respiratory failure with hypoxia  J96.01 518.81   3. MARIAA (acute kidney injury)  N17.9 584.9   4. Pharyngeal dysphagia  R13.13 787.23       Patient Active Problem List   Diagnosis    Nephrolithiasis    Ovarian teratoma, right    Other chronic pain    Pelvic pain    History of DVT in adulthood    History of pulmonary embolism    Ureteral calculus    Ischemic cerebrovascular accident (CVA)    Primary hypertension    Left lumbar radiculopathy    PTSD (post-traumatic stress disorder)    MARIAA (acute kidney injury)    Anemia    Dyslipidemia    Hypothyroid    Other secondary gout, multiple sites    Factor 5 Leiden mutation, heterozygous    Vitamin D deficiency    Vitamin B 12 deficiency    Type 2 diabetes mellitus with hyperglycemia    Hoarseness, persistent    Ureterolithiasis    Acute cystitis without hematuria    Hepatic steatosis    Right flank pain, chronic    Detrusor instability of bladder    Frequency of micturition    Acute respiratory failure with hypercapnia    Hyperkalemia    Sepsis    Respiratory acidosis with metabolic acidosis    Acute respiratory failure    Diabetes mellitus type 2, insulin dependent    Diabetic peripheral neuropathy associated with type 2 diabetes mellitus        Past Medical History:   Diagnosis Date    A-fib     Abnormal ECG     Anemia     Anxiety     Asthma     Cancer     Ovarian    Depression     Diabetes mellitus     DVT (deep venous thrombosis)     Factor 5 Leiden mutation, heterozygous     Fibroid     GERD (gastroesophageal reflux disease)     Gout     H/O abdominal abscess     History of sepsis     History of transfusion     Hyperlipidemia     Hypothyroid     Kidney stone     Migraines     Neuropathy     Ovarian cancer 02/2021    Ovarian cyst     PE (pulmonary embolism)     Polycystic ovary syndrome     Preeclampsia     Rh incompatibility     Stroke      TIA (transient ischemic attack)     Urinary tract infection     Varicella         Past Surgical History:   Procedure Laterality Date    ABDOMINAL SURGERY      CARDIAC CATHETERIZATION       SECTION      CHOLECYSTECTOMY      COLONOSCOPY      ENDOSCOPY      EXTRACORPOREAL SHOCK WAVE LITHOTRIPSY (ESWL) Left 10/22/2021    Procedure: EXTRACORPOREAL SHOCKWAVE LITHOTRIPSY;  Surgeon: Milan Motley MD;  Location: [facility];  Service: Urology;  Laterality: Left;    HYSTERECTOMY  2017    ovarian ca    INSERT CENTRAL LINE AT BEDSIDE  2025    LITHOTRIPSY      NEPHRECTOMY Left 10/2022    RIGHT OOPHORECTOMY      URETEROSCOPY LASER LITHOTRIPSY WITH STENT INSERTION Left 10/01/2021    Procedure: URETEROSCOPY WITH STENT PLACEMENT;  Surgeon: Milan Motley MD;  Location: [facility] OR;  Service: Urology;  Laterality: Left;    URETEROSCOPY LASER LITHOTRIPSY WITH STENT INSERTION Left 2022    Procedure: URETEROSCOPY STENT REMOVAL;  Surgeon: Milan Motley MD;  Location: [facility];  Service: Urology;  Laterality: Left;    URETEROSCOPY LASER LITHOTRIPSY WITH STENT INSERTION Left 2022    Procedure: CYSTOSCOPY RETROGRADE LEFT WITH STENT PLACEMENT;  Surgeon: Milan Motley MD;  Location: [facility];  Service: Urology;  Laterality: Left;           Wound 25 0400 Left lateral flank (Active)   Wound Image   25 1500   Dressing Appearance open to air 25 1500   Closure Adhesive bandage 25 0830   Base dry;slough;yellow;brown 25 1500   Periwound dry;intact 25 1500   Periwound Temperature warm 25 1500   Periwound Skin Turgor soft 25 1500   Edges open;irregular 25 1500   Drainage Amount none 25 1500   Care, Wound cleansed with;wound cleanser;debrided 25 1500   Dressing Care foam;low-adherent;petroleum-based 25 1500   Periwound Care dry periwound area maintained 25 1500         WOUND DEBRIDEMENT  Total area of  Debridement: ~4cm2  Debridement Site 1  Location- Site 1: L lateral back  Selective Debridement- Site 1: Wound Surface <20cmsq  Instruments- Site 1: tweezers, scapel, #15  Excised Tissue Description- Site 1: minimum, slough  Bleeding- Site 1: none               PT Assessment (Last 12 Hours)       PT Evaluation and Treatment       Row Name 01/28/25 1500          Physical Therapy Time and Intention    Subjective Information no complaints  -MF     Document Type wound care;therapy note (daily note)  -     Mode of Treatment individual therapy;physical therapy  -       Row Name 01/28/25 1500          Pain Scale: FACES Pre/Post-Treatment    Pain: FACES Scale, Pretreatment 0-->no hurt  -MF     Posttreatment Pain Rating 0-->no hurt  -MF       Row Name 01/28/25 1500          Wound 01/05/25 0400 Left lateral flank    Wound - Properties Group Placement Date: 01/05/25 -BC Placement Time: 0400 -BC Side: Left  -BC Orientation: lateral  -BC Location: flank  -BC    Wound Image Images linked: 1  -MF     Dressing Appearance open to air  -MF     Base dry;slough;yellow;brown  -MF     Periwound dry;intact  -MF     Periwound Temperature warm  -MF     Periwound Skin Turgor soft  -MF     Edges open;irregular  -MF     Drainage Amount none  -MF     Care, Wound cleansed with;wound cleanser;debrided  -MF     Dressing Care foam;low-adherent;petroleum-based  xeroform with optifoam  -MF     Periwound Care dry periwound area maintained  -MF     Retired Wound - Properties Group Placement Date: 01/05/25 -BC Placement Time: 0400 -BC Side: Left  -BC Orientation: lateral  -BC Location: flank  -BC    Retired Wound - Properties Group Placement Date: 01/05/25 -BC Placement Time: 0400 -BC Side: Left  -BC Orientation: lateral  -BC Location: flank  -BC    Retired Wound - Properties Group Date first assessed: 01/05/25 -BC Time first assessed: 0400 -BC Side: Left  -BC Location: flank  -BC      Row Name             Wound 01/15/25 0314 Left gluteal  blisters    Wound - Properties Group Placement Date: 01/15/25  -SE Placement Time: 0314  -SE Side: Left  -SE Location: gluteal  -SE Primary Wound Type: Blisters  -SE Type: blisters  -SE Present on Original Admission: N  -SE    Retired Wound - Properties Group Placement Date: 01/15/25  -SE Placement Time: 0314  -SE Present on Original Admission: N  -SE Side: Left  -SE Location: gluteal  -SE Primary Wound Type: Blisters  -SE Type: blisters  -SE    Retired Wound - Properties Group Placement Date: 01/15/25  -SE Placement Time: 0314  -SE Present on Original Admission: N  -SE Side: Left  -SE Location: gluteal  -SE Primary Wound Type: Blisters  -SE Type: blisters  -SE    Retired Wound - Properties Group Date first assessed: 01/15/25  -SE Time first assessed: 0314  -SE Present on Original Admission: N  -SE Side: Left  -SE Location: gluteal  -SE Primary Wound Type: Blisters  -SE Type: blisters  -SE      Row Name             Wound 01/17/25 2133 Right lateral throat    Wound - Properties Group Placement Date: 01/17/25  - Placement Time: 2133 -MH Side: Right  -MH Orientation: lateral  -MH Location: throat  -MH Primary Wound Type: Puncture  -MH    Retired Wound - Properties Group Placement Date: 01/17/25  -MH Placement Time: 2133 -MH Side: Right  -MH Orientation: lateral  -MH Location: throat  -MH Primary Wound Type: Puncture  -MH    Retired Wound - Properties Group Placement Date: 01/17/25  -MH Placement Time: 2133 -MH Side: Right  -MH Orientation: lateral  -MH Location: throat  -MH Primary Wound Type: Puncture  -MH    Retired Wound - Properties Group Date first assessed: 01/17/25  -MH Time first assessed: 2133 -MH Side: Right  -MH Location: throat  -MH Primary Wound Type: Puncture  -MH      Row Name             Wound 01/21/25 0746 lumbar spine    Wound - Properties Group Placement Date: 01/21/25  -AH Placement Time: 0746  -AH Location: lumbar spine  -AH Primary Wound Type: MASD  -AH, large white wound, appearing to be  worse than MASD  Present on Original Admission: N  -AH Additional Comments: nurse put zeroform and mepliex  -AH    Retired Wound - Properties Group Placement Date: 01/21/25  - Placement Time: 0746 - Present on Original Admission: N  -AH Location: lumbar spine  -AH Primary Wound Type: MASD  -AH, large white wound, appearing to be worse than MASD  Additional Comments: nurse put zeroform and mepliex  -AH    Retired Wound - Properties Group Placement Date: 01/21/25 - Placement Time: 0746 - Present on Original Admission: N  -AH Location: lumbar spine  -AH Primary Wound Type: MASD  -AH, large white wound, appearing to be worse than MASD  Additional Comments: nurse put zeroform and mepliex  -AH    Retired Wound - Properties Group Date first assessed: 01/21/25 - Time first assessed: 0746 -AH Present on Original Admission: N  -AH Location: lumbar spine  -AH Primary Wound Type: MASD  -AH, large white wound, appearing to be worse than MASD  Additional Comments: nurse put zeroform and mepliex  -AH      Row Name 01/28/25 1500          Coping    Observed Emotional State calm;cooperative;quiet  -     Verbalized Emotional State acceptance  -     Trust Relationship/Rapport care explained  -       Row Name 01/28/25 1500          Plan of Care Review    Plan of Care Reviewed With patient  -MF     Outcome Evaluation L lat flank wound stable with no significant change noted. PT was able to debride a small amount of nonviable slough.  no significant change noted with MIST therapy and with heavy slough covering PT will hold MIST and recheck in 2-3 days.  -       Row Name 01/28/25 1500          Positioning and Restraints    Pre-Treatment Position in bed  -     Post Treatment Position bed  -MF     In Bed supine;call light within reach  -               User Key  (r) = Recorded By, (t) = Taken By, (c) = Cosigned By      Initials Name Provider Type    Ted Prescott, PT Physical Therapist    Jojo Moe  S, RN Registered Nurse    Chary Bloom RN Registered Nurse    Viola Espinoza RN Registered Nurse    Rajani Montilla RN Registered Nurse                  Physical Therapy Education       Title: PT OT SLP Therapies (In Progress)       Topic: Physical Therapy (In Progress)       Point: Mobility training (In Progress)       Learning Progress Summary            Patient Acceptance, E, VU by ER at 1/27/2025 1127    Comment: progress, POC, need for PT    Acceptance, E, VU,NR by ML at 1/24/2025 1151    Acceptance, E, VU,NR by NS at 1/22/2025 1613    Comment: pt encouraged to continue with HEP between therapy sessions    Acceptance, E, NR by ML at 1/19/2025 1124   Family Acceptance, E, NR by ML at 1/19/2025 1124                      Point: Home exercise program (Done)       Learning Progress Summary            Patient Acceptance, E, VU by ER at 1/27/2025 1127    Comment: progress, POC, need for PT    Acceptance, E, VU,NR by ML at 1/24/2025 1151    Acceptance, E, VU,NR by NS at 1/22/2025 1613    Comment: pt encouraged to continue with HEP between therapy sessions                      Point: Body mechanics (Done)       Learning Progress Summary            Patient Acceptance, E, VU by ER at 1/27/2025 1127    Comment: progress, POC, need for PT    Acceptance, E, VU,NR by ML at 1/24/2025 1151    Acceptance, E, VU,NR by NS at 1/22/2025 1613    Comment: pt encouraged to continue with HEP between therapy sessions                      Point: Precautions (In Progress)       Learning Progress Summary            Patient Acceptance, E, VU by ER at 1/27/2025 1127    Comment: progress, POC, need for PT    Acceptance, E, VU,NR by NS at 1/22/2025 1613    Comment: pt encouraged to continue with HEP between therapy sessions    Acceptance, E, NR by ML at 1/19/2025 1124   Family Acceptance, E, NR by ML at 1/19/2025 1124                                      User Key       Initials Effective Dates Name Provider Type  Discipline    NS 21 -  Germaine Thao, PT Physical Therapist PT    ML 21 -  Debbi Hall Physical Therapist PT    ER 24 -  Yolande Nassar PT Physical Therapist PT                    Recommendation and Plan  Anticipated Discharge Disposition (PT): inpatient rehabilitation facility  Planned Therapy Interventions (PT): balance training, bed mobility training, gait training, home exercise program, patient/family education, postural re-education, strengthening, transfer training, ROM (range of motion), neuromuscular re-education  Therapy Frequency (PT): daily  Plan of Care Reviewed With: patient           Outcome Evaluation: L lat flank wound stable with no significant change noted. PT was able to debride a small amount of nonviable slough.  no significant change noted with MIST therapy and with heavy slough covering PT will hold MIST and recheck in 2-3 days.  Plan of Care Reviewed With: patient            Time Calculation   PT Charges       Row Name 25 1500             Time Calculation    Start Time 1500  -      PT Goal Re-Cert Due Date 25  -                User Key  (r) = Recorded By, (t) = Taken By, (c) = Cosigned By      Initials Name Provider Type    MF Ted Carbone, PT Physical Therapist                            PT G-Codes  Outcome Measure Options: AM-PAC 6 Clicks Basic Mobility (PT)  AM-PAC 6 Clicks Score (PT): 12  AM-PAC 6 Clicks Score (OT): 8       Ted Carbone, PT  2025      Electronically signed by Ted Carbone, PT at 25 1541          Occupational Therapy Notes (most recent note)        Theresa Farah, OT at 25 1430          Patient Name: Natalia Arzate  : 1985    MRN: [id number]                              Today's Date: 2025       Admit Date: 2025    Visit Dx:     ICD-10-CM ICD-9-CM   1. Respiratory acidosis with metabolic acidosis  E87.4 276.3   2. Acute respiratory failure with hypoxia  J96.01 518.81   3. MARIAA  (acute kidney injury)  N17.9 584.9   4. Pharyngeal dysphagia  R13.13 787.23     Patient Active Problem List   Diagnosis    Nephrolithiasis    Ovarian teratoma, right    Other chronic pain    Pelvic pain    History of DVT in adulthood    History of pulmonary embolism    Ureteral calculus    Ischemic cerebrovascular accident (CVA)    Primary hypertension    Left lumbar radiculopathy    PTSD (post-traumatic stress disorder)    MARIAA (acute kidney injury)    Anemia    Dyslipidemia    Hypothyroid    Other secondary gout, multiple sites    Factor 5 Leiden mutation, heterozygous    Vitamin D deficiency    Vitamin B 12 deficiency    Type 2 diabetes mellitus with hyperglycemia    Hoarseness, persistent    Ureterolithiasis    Acute cystitis without hematuria    Hepatic steatosis    Right flank pain, chronic    Detrusor instability of bladder    Frequency of micturition    Acute respiratory failure with hypercapnia    Hyperkalemia    Sepsis    Respiratory acidosis with metabolic acidosis    Acute respiratory failure    Diabetes mellitus type 2, insulin dependent    Diabetic peripheral neuropathy associated with type 2 diabetes mellitus     Past Medical History:   Diagnosis Date    A-fib     Abnormal ECG     Anemia     Anxiety     Asthma     Cancer     Ovarian    Depression     Diabetes mellitus     DVT (deep venous thrombosis)     Factor 5 Leiden mutation, heterozygous     Fibroid     GERD (gastroesophageal reflux disease)     Gout     H/O abdominal abscess     History of sepsis     History of transfusion     Hyperlipidemia     Hypothyroid     Kidney stone     Migraines     Neuropathy     Ovarian cancer 2021    Ovarian cyst     PE (pulmonary embolism)     Polycystic ovary syndrome     Preeclampsia     Rh incompatibility     Stroke     TIA (transient ischemic attack)     Urinary tract infection     Varicella      Past Surgical History:   Procedure Laterality Date    ABDOMINAL SURGERY      CARDIAC CATHETERIZATION        SECTION      CHOLECYSTECTOMY      COLONOSCOPY      ENDOSCOPY      EXTRACORPOREAL SHOCK WAVE LITHOTRIPSY (ESWL) Left 10/22/2021    Procedure: EXTRACORPOREAL SHOCKWAVE LITHOTRIPSY;  Surgeon: Milan Motley MD;  Location: [facility];  Service: Urology;  Laterality: Left;    HYSTERECTOMY  2017    ovarian ca    INSERT CENTRAL LINE AT BEDSIDE  1/16/2025    LITHOTRIPSY      NEPHRECTOMY Left 10/2022    RIGHT OOPHORECTOMY      URETEROSCOPY LASER LITHOTRIPSY WITH STENT INSERTION Left 10/01/2021    Procedure: URETEROSCOPY WITH STENT PLACEMENT;  Surgeon: Milan Motley MD;  Location: [facility];  Service: Urology;  Laterality: Left;    URETEROSCOPY LASER LITHOTRIPSY WITH STENT INSERTION Left 04/27/2022    Procedure: URETEROSCOPY STENT REMOVAL;  Surgeon: Milan Motley MD;  Location: [facility];  Service: Urology;  Laterality: Left;    URETEROSCOPY LASER LITHOTRIPSY WITH STENT INSERTION Left 06/29/2022    Procedure: CYSTOSCOPY RETROGRADE LEFT WITH STENT PLACEMENT;  Surgeon: Milan Motley MD;  Location: [facility];  Service: Urology;  Laterality: Left;      General Information       Row Name 01/29/25 1506          OT Time and Intention    Document Type progress note/recertification  -LR     Mode of Treatment occupational therapy  -LR       Row Name 01/29/25 1506          General Information    Patient Profile Reviewed yes  -LR     Existing Precautions/Restrictions fall;oxygen therapy device and L/min;other (see comments)  Contact; NG; back wound; hx of CVA with L sided deficits  -LR     Barriers to Rehab medically complex;previous functional deficit;physical barrier  -LR       Row Name 01/29/25 1506          Cognition    Orientation Status (Cognition) oriented x 3  -LR       Row Name 01/29/25 1506          Safety Issues/Impairments Affecting Functional Mobility    Safety Issues Affecting Function (Mobility) safety precautions follow-through/compliance;safety precaution awareness;awareness of need  for assistance;insight into deficits/self-awareness;sequencing abilities  -LR     Impairments Affecting Function (Mobility) balance;coordination;endurance/activity tolerance;grasp;motor control;motor planning;pain;postural/trunk control;range of motion (ROM);sensation/sensory awareness;shortness of breath;strength;other (see comments)  extreme FOF  -LR               User Key  (r) = Recorded By, (t) = Taken By, (c) = Cosigned By      Initials Name Provider Type    LR Theresa Farah, OT Occupational Therapist                     Mobility/ADL's       Row Name 01/29/25 1507          Bed Mobility    Comment, (Bed Mobility) UIC  -LR       Row Name 01/29/25 1507          Transfers    Transfers sit-stand transfer;stand-sit transfer;toilet transfer  -LR       Row Name 01/29/25 1507          Bed-Chair Transfer    Bed-Chair [place] (Transfers) maximum assist (25% patient effort);2 person assist;verbal cues  -LR     Assistive Device (Bed-Chair Transfers) other (see comments)  BUE support  -LR       Row Name 01/29/25 1507          Sit-Stand Transfer    Sit-Stand [place] (Transfers) maximum assist (25% patient effort);2 person assist;verbal cues  -LR     Assistive Device (Sit-Stand Transfers) other (see comments)  BUE support  -LR       Row Name 01/29/25 1507          Stand-Sit Transfer    Stand-Sit [place] (Transfers) maximum assist (25% patient effort);2 person assist;verbal cues  -LR     Assistive Device (Stand-Sit Transfers) other (see comments)  BUE support  -LR       Row Name 01/29/25 1507          Toilet Transfer    Type (Toilet Transfer) sit-stand;stand-sit  -LR     [place] Level (Toilet Transfer) maximum assist (25% patient effort);2 person assist;nonverbal cues (demo/gesture);verbal cues  -LR     Assistive Device (Toilet Transfer) commode, bedside without drop arms  -LR     Comment, (Toilet Transfer) VC for hand placement and encouragement for  -LR       Row Name 01/29/25 1507          Functional  Mobility    Functional Mobility- Ind. Level maximum assist (25% patient effort);2 person assist required;verbal cues required;nonverbal cues required (demo/gesture)  -LR     Functional Mobility- Device other (see comments)  BUE support  -LR     Functional Mobility-Distance (Feet) --  side steps for transfers  -LR     Patient was able to Ambulate yes  -LR       Row Name 01/29/25 1507          Activities of Daily Living    BADL Assessment/Intervention grooming;toileting  -LR       Row Name 01/29/25 1507          Toileting Assessment/Training    [place] Level (Toileting) perform perineal hygiene;maximum assist (25% patient effort)  -LR     Position (Toileting) supported standing  -LR       Row Name 01/29/25 1507          Grooming Assessment/Training    [place] Level (Grooming) wash face, hands;set up  -LR     Position (Grooming) supported sitting  -LR               User Key  (r) = Recorded By, (t) = Taken By, (c) = Cosigned By      Initials Name Provider Type    LR Theresa Farah, OT Occupational Therapist                   Obj/Interventions       Row Name 01/29/25 1512          Shoulder (Therapeutic Exercise)    Shoulder (Therapeutic Exercise) PROM (passive range of motion)  -LR     Shoulder PROM (Therapeutic Exercise) right;flexion;extension;10 repetitions  -       Row Name 01/29/25 1512          Elbow/Forearm (Therapeutic Exercise)    Elbow/Forearm (Therapeutic Exercise) PROM (passive range of motion)  -LR     Elbow/Forearm PROM (Therapeutic Exercise) right;flexion;extension;supination;pronation;sitting;10 repetitions  -       Row Name 01/29/25 1512          Wrist (Therapeutic Exercise)    Wrist (Therapeutic Exercise) AROM (active range of motion)  -LR     Wrist AROM (Therapeutic Exercise) right;flexion;extension;10 repetitions  -       Row Name 01/29/25 1512          Hand (Therapeutic Exercise)    Hand (Therapeutic Exercise) AROM (active range of motion)  -LR     Hand AROM/AAROM (Therapeutic  Exercise) right;AROM (active range of motion);finger flexion;finger extension;10 repetitions  -LR       Row Name 01/29/25 1512          Motor Skills    Therapeutic Exercise shoulder;elbow/forearm;wrist;hand  -LR       Row Name 01/29/25 1512          Balance    Balance Assessment sitting static balance;sitting dynamic balance;standing static balance;standing dynamic balance  -LR     Static Sitting Balance standby assist  -LR     Dynamic Sitting Balance contact guard  -LR     Position, Sitting Balance supported;sitting in chair  -LR     Static Standing Balance maximum assist;2-person assist;verbal cues  -LR     Dynamic Standing Balance maximum assist;2-person assist;verbal cues  -LR     Position/Device Used, Standing Balance supported;other (see comments)  BUE support  -LR     Balance Interventions sitting;standing;sit to stand;supported;static;dynamic;occupation based/functional task  -LR               User Key  (r) = Recorded By, (t) = Taken By, (c) = Cosigned By      Initials Name Provider Type    LR Theresa Farah, OT Occupational Therapist                   Goals/Plan       Row Name 01/29/25 1520          Bed Mobility Goal 1 (OT)    Activity/Assistive Device (Bed Mobility Goal 1, OT) sit to supine/supine to sit  -LR     [place] Level/Cues Needed (Bed Mobility Goal 1, OT) minimum assist (75% or more patient effort);verbal cues required  -LR     Time Frame (Bed Mobility Goal 1, OT) short term goal (STG);5 days  -LR     Progress/Outcomes (Bed Mobility Goal 1, OT) continuing progress toward goal;progress slower than expected  -LR       Row Name 01/29/25 1520          Transfer Goal 1 (OT)    Activity/Assistive Device (Transfer Goal 1, OT) bed-to-chair/chair-to-bed  -LR     [place] Level/Cues Needed (Transfer Goal 1, OT) moderate assist (50-74% patient effort);verbal cues required;other (see comments)  x2  -LR     Time Frame (Transfer Goal 1, OT) long term goal (LTG);by discharge  -LR     Progress/Outcome  (Transfer Goal 1, OT) goal revised this date  -LR       Row Name 01/29/25 1520          Strength Goal 1 (OT)    Strength Goal 1 (OT) Pt to increase R UE strength by 1/2 muscle grade to support ADL independence.  -LR     Time Frame (Strength Goal 1, OT) long term goal (LTG);by discharge  -LR     Progress/Outcome (Strength Goal 1, OT) continuing progress toward goal  -LR       Row Name 01/29/25 1520          Therapy Assessment/Plan (OT)    Planned Therapy Interventions (OT) activity tolerance training;adaptive equipment training;BADL retraining;occupation/activity based interventions;strengthening exercise;functional balance retraining;IADL retraining;passive ROM/stretching;ROM/therapeutic exercise;patient/caregiver education/training  -LR               User Key  (r) = Recorded By, (t) = Taken By, (c) = Cosigned By      Initials Name Provider Type    LR Theresa Farah, OT Occupational Therapist                   Clinical Impression       Row Name 01/29/25 1514          Pain Assessment    Pain Location back  -LR     Pain Side/Orientation medial  -LR     Pain Management Interventions nursing notified;exercise or physical activity utilized  -LR     Response to Pain Interventions activity participation with tolerable pain  -LR       Row Name 01/29/25 1514          Pain Scale: FACES Pre/Post-Treatment    Pain: FACES Scale, Pretreatment 2-->hurts little bit  -LR     Posttreatment Pain Rating 2-->hurts little bit  -LR       Row Name 01/29/25 1514          Plan of Care Review    Plan of Care Reviewed With patient;spouse  -LR     Progress improving  -LR     Outcome Evaluation Pt continues to present below baseline with R sided weakness, decreased strength/activity tolerance, pain, and balance deficits. Max a x2 for STS and side steps to BSC. Pt and spouse educated on UE ther ex. Good demo of learning. Goals updated as appropriate this date. Recommend IPOT POC and IRF at D/C.  -LR       Row Name 01/29/25 8400           Therapy Plan Review/Discharge Plan (OT)    Anticipated Discharge Disposition (OT) inpatient rehabilitation facility  -LR       Row Name 01/29/25 1514          Vital Signs    Pre Systolic BP Rehab 154  -LR     Pre Treatment Diastolic BP 60  -LR     Pretreatment Heart Rate (beats/min) 62  -LR     Pre SpO2 (%) 95  -LR     O2 Delivery Pre Treatment room air  -LR     O2 Delivery Intra Treatment room air  -LR     Post SpO2 (%) 96  -LR     O2 Delivery Post Treatment room air  -LR     Pre Patient Position Sitting  -LR     Intra Patient Position Standing  -LR     Post Patient Position Sitting  -LR       Row Name 01/29/25 1514          Positioning and Restraints    Pre-Treatment Position sitting in chair/recliner  -LR     Post Treatment Position chair  -LR     In Chair notified nsg;reclined;call light within reach;encouraged to call for assist;exit alarm on;waffle cushion;with family/caregiver  -LR               User Key  (r) = Recorded By, (t) = Taken By, (c) = Cosigned By      Initials Name Provider Type    Theresa Kwan OT Occupational Therapist                   Outcome Measures       Row Name 01/29/25 1517          How much help from another is currently needed...    Putting on and taking off regular lower body clothing? 1  -LR     Bathing (including washing, rinsing, and drying) 2  -LR     Toileting (which includes using toilet bed pan or urinal) 2  -LR     Putting on and taking off regular upper body clothing 3  -LR     Taking care of personal grooming (such as brushing teeth) 3  -LR     Eating meals 3  -LR     AM-PAC 6 Clicks Score (OT) 14  -LR       Row Name 01/29/25 1517          Functional Assessment    Outcome Measure Options AM-PAC 6 Clicks Daily Activity (OT)  -LR               User Key  (r) = Recorded By, (t) = Taken By, (c) = Cosigned By      Initials Name Provider Type    Theresa Kwan OT Occupational Therapist                    Occupational Therapy Education       Title: PT OT SLP Therapies  (In Progress)       Topic: Occupational Therapy (In Progress)       Point: ADL training (In Progress)       Description:   Instruct learner(s) on proper safety adaptation and remediation techniques during self care or transfers.   Instruct in proper use of assistive devices.                  Learning Progress Summary            Patient Acceptance, E, NR by LR at 1/29/2025 1430    Acceptance, E,D, DU by SELENA at 1/24/2025 1115    Comment: OT POC; UE HEP; hand strengthening; self-repositioning    Acceptance, E, VU,NR by JR at 1/19/2025 0933    Comment: role of therapy, ongoing treatment plan and discharge recommendations   Family Acceptance, E, VU,NR by JR at 1/19/2025 0933    Comment: role of therapy, ongoing treatment plan and discharge recommendations   Significant Other Acceptance, E,D, DU by SELENA at 1/24/2025 1115    Comment: OT POC; UE HEP; hand strengthening; self-repositioning                      Point: Home exercise program (In Progress)       Description:   Instruct learner(s) on appropriate technique for monitoring, assisting and/or progressing therapeutic exercises/activities.                  Learning Progress Summary            Patient Acceptance, E, NR by LR at 1/29/2025 1430    Acceptance, E,D, DU by SELENA at 1/24/2025 1115    Comment: OT POC; UE HEP; hand strengthening; self-repositioning   Significant Other Acceptance, E,D, DU by SELENA at 1/24/2025 1115    Comment: OT POC; UE HEP; hand strengthening; self-repositioning                      Point: Precautions (In Progress)       Description:   Instruct learner(s) on prescribed precautions during self-care and functional transfers.                  Learning Progress Summary            Patient Acceptance, E, NR by LR at 1/29/2025 1430                      Point: Body mechanics (In Progress)       Description:   Instruct learner(s) on proper positioning and spine alignment during self-care, functional mobility activities and/or exercises.                  Learning  Progress Summary            Patient Acceptance, E, NR by LR at 1/29/2025 1430    Acceptance, E, VU,NR by JR at 1/19/2025 0933    Comment: role of therapy, ongoing treatment plan and discharge recommendations   Family Acceptance, E, VU,NR by JR at 1/19/2025 0933    Comment: role of therapy, ongoing treatment plan and discharge recommendations                                      User Key       Initials Effective Dates Name Provider Type Discipline     02/03/23 -  Alpa Bangura, OT Occupational Therapist OT    SELENA 06/16/21 -  Pallavi Fletcher, OT Occupational Therapist OT    LR 10/09/24 -  Theresa Farah, OT Occupational Therapist OT                  OT Recommendation and Plan  Planned Therapy Interventions (OT): activity tolerance training, adaptive equipment training, BADL retraining, occupation/activity based interventions, strengthening exercise, functional balance retraining, IADL retraining, passive ROM/stretching, ROM/therapeutic exercise, patient/caregiver education/training  Plan of Care Review  Plan of Care Reviewed With: patient, spouse  Progress: improving  Outcome Evaluation: Pt continues to present below baseline with R sided weakness, decreased strength/activity tolerance, pain, and balance deficits. Max a x2 for STS and side steps to BSC. Pt and spouse educated on UE ther ex. Good demo of learning. Goals updated as appropriate this date. Recommend IPOT POC and IRF at D/C.     Time Calculation:         Time Calculation- OT       Row Name 01/29/25 1519             Time Calculation- OT    OT Start Time 1430  -LR      OT Received On 01/29/25  -LR      OT Goal Re-Cert Due Date 02/08/25  -LR         Timed Charges    59269 - OT Therapeutic Exercise Minutes 11  -LR      29406 - OT Therapeutic Activity Minutes 13  -LR      50041 - OT Self Care/Mgmt Minutes 15  -LR         Total Minutes    Timed Charges Total Minutes 39  -LR       Total Minutes 39  -LR                User Key  (r) = Recorded By, (t) = Taken  By, (c) = Cosigned By      Initials Name Provider Type    LR Theresa Farah, KORI Occupational Therapist                  Therapy Charges for Today       Code Description Service Date Service Provider Modifiers Qty    88685886684  OT THER PROC EA 15 MIN 1/29/2025 Theresa Farah, KORI GO 1    [id number]  OT THERAPEUTIC ACT EA 15 MIN 1/29/2025 Theresa Farah OT GO 1    [id number]  OT SELF CARE/MGMT/TRAIN EA 15 MIN 1/29/2025 Theresa Farah, OT GO 1                 Theresa Farah OT  1/29/2025    Electronically signed by Theresa Farah OT at 01/29/25 152

## 2025-01-29 NOTE — PROGRESS NOTES
[facility] Medicine Services  PROGRESS NOTE    Patient Name: Natalia Arzate  : 1985  MRN: [id number]    Date of Admission: 2025  Primary Care Physician: Bladimir Calle PA-C    Subjective   Subjective     CC:  Resp failure    HPI:  Patient seen and examined this morning.  Comfortable in bed.  Eating by mouth now and happy about it.  Keofeed discontinued.  Diarrhea improved.  Currently awaiting rehab.    Objective   Objective     Vital Signs:   Temp:  [97 °F (36.1 °C)-98.4 °F (36.9 °C)] 97.6°F (36.9 °C)  Heart Rate:  [] 91  Resp:  [18] 18  BP: (127-132)/() 127/81     Physical Exam:  General: morbidly obese , not in distress, conversant and cooperative  Head: Atraumatic and normocephalic  Eyes: No Icterus. No pallor  Ears:  Ears appear intact with no abnormalities noted  Throat: No oral lesions, no thrush  Neck: Supple, trachea midline  Lungs: Clear to auscultation bilaterally, equal air entry, no wheezing or crackles  Heart:  Normal S1 and S2, no murmur, no gallop, No JVD, 2+ lower extremity swelling  Abdomen:  Soft, no tenderness, no organomegaly, normal bowel sounds, no organomegaly  Extremities: pulses equal bilaterally  Skin: No bleeding, bruising or rash, normal skin turgor and elasticity  Neurologic: Cranial nerves appear intact with no evidence of facial asymmetry, normal motor and sensory functions in all 4 extremities  Psych: Alert and oriented x 3, normal mood     Results Reviewed:  LAB RESULTS:      Lab 25  1603 25  0554 25  0702 25  0552 25  0224 25  1603   WBC  --  5.58  --   --  6.05  --    HEMOGLOBIN  --  9.0*  --   --  9.7*  --    HEMATOCRIT  --  27.6*  --   --  30.1*  --    PLATELETS  --  167  --   --  188  --    NEUTROS ABS  --  3.18  --   --  3.57  --    IMMATURE GRANS (ABS)  --  0.02  --   --  0.01  --    LYMPHS ABS  --  1.47  --   --  1.48  --    MONOS ABS  --  0.52  --   --  0.57  --    EOS ABS  --   0.33  --   --  0.38  --    MCV  --  90.2  --   --  90.7  --    CRP  --   --   --   --  3.58* 2.18*   HEPARIN ANTI-XA 0.90  --   --   --   --   --    HSTROP T  --   --  36* 34*  --   --          Lab 01/27/25  0554 01/26/25  0224 01/25/25  0627 01/24/25  1603 01/23/25  0453   SODIUM 131* 126*  --  132*  --    POTASSIUM 4.7 4.1  --  4.5  --    CHLORIDE 91* 86*  --  90*  --    CO2 24.0 27.0  --  28.0  --    ANION GAP 16.0* 13.0  --  14.0  --    BUN 36* 28*  --  22*  --    CREATININE 0.63 0.58  --  0.52*  --    EGFR 116.6 119.0  --  122.1  --    GLUCOSE 235* 241*  --  231*  --    CALCIUM 10.8* 10.8*  --  10.4  --    MAGNESIUM 1.6 1.2* 1.8 1.2* 2.3   PHOSPHORUS  --  4.5  --  5.0*  --          Lab 01/26/25  0224 01/24/25  1603   TOTAL PROTEIN 8.1 8.1   ALBUMIN 4.1 3.9   GLOBULIN 4.0 4.2   ALT (SGPT) 38* 35*   AST (SGOT) 64* 70*   BILIRUBIN 0.6 0.5   ALK PHOS 140* 134*         Lab 01/26/25  0702 01/26/25  0552   HSTROP T 36* 34*         Lab 01/24/25  1603   CHOLESTEROL 264*   TRIGLYCERIDES 739*                 Brief Urine Lab Results  (Last result in the past 365 days)        Color   Clarity   Blood   Leuk Est   Nitrite   Protein   CREAT   Urine HCG        01/02/25 1213             52.3         01/02/25 1213 Yellow   Cloudy   Moderate (2+)   Negative   Negative   100 mg/dL (2+)                   Microbiology Results Abnormal       Procedure Component Value - Date/Time    Respiratory Culture - Wash, Lung, Left Lower Lobe [[id number]]  (Abnormal)  (Susceptibility) Collected: 01/14/25 1608    Lab Status: Final result Specimen: Wash from Lung, Left Lower Lobe Updated: 01/17/25 1221     Respiratory Culture Light growth (2+) Klebsiella pneumoniae ESBL     Comment:   Consider infectious disease consult.  Susceptibility results may not correlate to clinical outcomes.         Light growth (2+) Staphylococcus aureus, MRSA     Comment:   Considering site of infection and appropriate dosing, oxacillin (or cefoxitin) results can be  applied to cefazolin, nafcillin, ampicillin/sulbactam, dicloxacillin, amoxicillin/clavulanate, cephalexin, and cefpodoxime.  Methicillin resistant Staphylococcus aureus, Patient may be an isolation risk.         Rare growth Normal Respiratory Margo     Gram Stain Many (4+) WBCs per low power field      Rare (1+) Epithelial cells per low power field      Rare (1+) Gram positive cocci in pairs and clusters    Susceptibility        Klebsiella pneumoniae ESBL      KELSIE      Ciprofloxacin Resistant      Ertapenem Susceptible      Levofloxacin Resistant      Meropenem Susceptible      Tetracycline Resistant      Trimethoprim + Sulfamethoxazole Resistant                       Susceptibility        Staphylococcus aureus, MRSA      KELSIE      Clindamycin Resistant      Linezolid Susceptible      Oxacillin Resistant      Tetracycline Susceptible      Trimethoprim + Sulfamethoxazole Susceptible      Vancomycin Susceptible                       Susceptibility Comments       Klebsiella pneumoniae ESBL    With the exception of urinary-sourced infections, aminoglycosides should not be used as monotherapy.      Staphylococcus aureus, MRSA    This isolate is presumed to be clindamycin resistant based on detection of inducible clindamycin resistance.  Clindamycin may still be effective in some patients.  This isolate is presumed to be clindamycin resistant based on detection of inducible clindamycin resistance.  Clindamycin may still be effective in some patients.               Respiratory Culture - Sputum, ET Suction [[id number]]  (Abnormal) Collected: 01/14/25 2836    Lab Status: Final result Specimen: Sputum from ET Suction Updated: 01/17/25 1221     Respiratory Culture Light growth (2+) Klebsiella pneumoniae ESBL     Comment:   Consider infectious disease consult.  Susceptibility results may not correlate to clinical outcomes.         Light growth (2+) Staphylococcus aureus, MRSA     Comment:   Considering site of infection and  appropriate dosing, oxacillin (or cefoxitin) results can be applied to cefazolin, nafcillin, ampicillin/sulbactam, dicloxacillin, amoxicillin/clavulanate, cephalexin, and cefpodoxime.  Methicillin resistant Staphylococcus aureus, Patient may be an isolation risk.        Gram Stain Many (4+) WBCs per low power field      Rare (1+) Epithelial cells per low power field      Few (2+) Gram positive cocci in pairs, chains and clusters    Narrative:      Refer to LLL Wash culture for MICS.     Blood Culture - Blood, Blood, Arterial Line [[id number]]  (Abnormal) Collected: 01/14/25 1434    Lab Status: Final result Specimen: Blood, Arterial Line Updated: 01/17/25 0636     Blood Culture Staphylococcus, coagulase negative     Isolated from Anaerobic Bottle     Gram Stain Anaerobic Bottle Gram positive cocci in pairs and clusters    Narrative:      Probable contaminant requires clinical correlation, susceptibility not performed unless requested by physician.      Blood Culture ID, PCR - Blood, Blood, Arterial Line [[id number]]  (Abnormal) Collected: 01/14/25 1434    Lab Status: Final result Specimen: Blood, Arterial Line Updated: 01/16/25 0657     BCID, PCR Staph spp, not aureus or lugdunensis. Identification by BCID2 PCR.     BOTTLE TYPE Anaerobic Bottle    Blood Culture - Blood, Arm, Left [[id number]]  (Abnormal) Collected: 01/05/25 1305    Lab Status: Final result Specimen: Blood from Arm, Left Updated: 01/07/25 1100     Blood Culture Staphylococcus, coagulase negative     Isolated from Anaerobic Bottle     Gram Stain Anaerobic Bottle Gram positive cocci in clusters    Narrative:      Less than seven (7) mL's of blood was collected.  Insufficient quantity may yield false negative results.  Probable contaminant requires clinical correlation, susceptibility not performed unless requested by physician.    Blood Culture ID, PCR - Blood, Arm, Left [[id number]]  (Abnormal) Collected: 01/05/25 1305    Lab Status: Final result Specimen:  Blood from Arm, Left Updated: 01/06/25 1435     BCID, PCR Staph spp, not aureus or lugdunensis. Identification by BCID2 PCR.     BOTTLE TYPE Anaerobic Bottle    Respiratory Culture - Bronchial Wash, Lung, Left Lower Lobe [[id number]]  (Abnormal)  (Susceptibility) Collected: 01/04/25 0755    Lab Status: Final result Specimen: Bronchial Wash from Lung, Left Lower Lobe Updated: 01/06/25 0908     Respiratory Culture Scant growth (1+) Staphylococcus aureus, MRSA     Comment:   Methicillin resistant Staphylococcus aureus, Patient may be an isolation risk.         No Normal Respiratory Margo     Gram Stain Moderate (3+) WBCs seen      Rare (1+) Gram positive cocci in pairs and clusters    Susceptibility        Staphylococcus aureus, MRSA      KELSIE      Clindamycin Resistant      Linezolid Susceptible      Oxacillin Resistant      Tetracycline Susceptible      Trimethoprim + Sulfamethoxazole Susceptible      Vancomycin Susceptible                       Susceptibility Comments       Staphylococcus aureus, MRSA    This isolate is presumed to be clindamycin resistant based on detection of inducible clindamycin resistance.  Clindamycin may still be effective in some patients.  This isolate is presumed to be clindamycin resistant based on detection of inducible clindamycin resistance.  Clindamycin may still be effective in some patients.               Respiratory Culture - Sputum, ET Suction [[id number]]  (Abnormal)  (Susceptibility) Collected: 01/02/25 1212    Lab Status: Final result Specimen: Sputum from ET Suction Updated: 01/04/25 0939     Respiratory Culture Moderate growth (3+) Staphylococcus aureus, MRSA      No Normal Respiratory Margo     Gram Stain Many (4+) WBCs per low power field      Rare (1+) Epithelial cells per low power field      Many (4+) Gram positive cocci in pairs and clusters    Susceptibility        Staphylococcus aureus, MRSA      KELSIE      Clindamycin Resistant      Linezolid Susceptible      Oxacillin  Resistant      Tetracycline Susceptible      Trimethoprim + Sulfamethoxazole Susceptible      Vancomycin Susceptible                       Susceptibility Comments       Staphylococcus aureus, MRSA    This isolate is presumed to be clindamycin resistant based on detection of inducible clindamycin resistance.  Clindamycin may still be effective in some patients.               MRSA Screen, PCR (Inpatient) - Swab, Nares [[id number]]  (Abnormal) Collected: 01/02/25 1246    Lab Status: Final result Specimen: Swab from Nares Updated: 01/02/25 1504     MRSA PCR Positive    Narrative:      The negative predictive value of this diagnostic test is high and should only be used to consider de-escalating anti-MRSA therapy. A positive result may indicate colonization with MRSA and must be correlated clinically.            SLP FEES - Fiberoptic Endo Eval Swallow    Result Date: 1/27/2025  This procedure was auto-finalized with no dictation required.     Results for orders placed during the hospital encounter of 01/02/25    Adult Transthoracic Echo Complete W/ Cont if Necessary Per Protocol    Interpretation Summary    Left ventricular systolic function is normal. Calculated left ventricular EF = 59.3% Left ventricular ejection fraction appears to be 61 - 65%.    Left ventricular wall thickness is consistent with borderline concentric hypertrophy.    Left ventricular diastolic function was normal.    Mild aortic valve stenosis is present.    Peak velocity of the flow distal to the aortic valve is 292 cm/s. Aortic valve mean pressure gradient is 20 mmHg.    Estimated right ventricular systolic pressure from tricuspid regurgitation is normal (<35 mmHg).      Current medications:  Scheduled Meds:amitriptyline, 25 mg, Oral, Nightly  amLODIPine, 10 mg, Oral, Daily  cholestyramine light, 1 packet, Oral, Q8H  collagenase, 1 Application, Topical, Q24H  DULoxetine, 30 mg, Oral, Nightly  DULoxetine, 60 mg, Oral, Daily  enoxaparin, 120 mg,  Subcutaneous, Q12H  gabapentin, 300 mg, Oral, Q8H  hydrOXYzine, 25 mg, Oral, Nightly  insulin glargine, 50 Units, Subcutaneous, Q12H  Insulin Lispro, 20 Units, Subcutaneous, TID With Meals  insulin lispro, 4-24 Units, Subcutaneous, 4x Daily AC & at Bedtime  lactobacillus acidophilus, 1 capsule, Oral, Daily  metoclopramide, 10 mg, Intravenous, Q6H  metoprolol succinate XL, 50 mg, Oral, BID  Nutrisource fiber, 1 packet, Per G Tube, TID  nystatin, , Topical, Q12H  Psyllium, 1 packet, Oral, BID  [Held by provider] torsemide, 40 mg, Oral, Daily      Continuous Infusions:   PRN Meds:.  acetaminophen    dextrose    dextrose    glucagon (human recombinant)    HYDROcodone-acetaminophen    HYDROmorphone    ipratropium    ipratropium-albuterol    loperamide    Magnesium Standard Dose Replacement - Follow Nurse / BPA Driven Protocol    meclizine    melatonin    midazolam    ondansetron    oxyCODONE    Polyvinyl Alcohol-Povidone PF    Potassium Replacement - Follow Nurse / BPA Driven Protocol    sodium chloride    sodium chloride    Assessment & Plan   Assessment & Plan     Active Hospital Problems    Diagnosis  POA    **Acute respiratory failure with hypercapnia [J96.02]  Yes    Sepsis [A41.9]  Yes    Type 2 diabetes mellitus with hyperglycemia [E11.65]  Yes    Hypothyroid [E03.9]  Yes    Factor 5 Leiden mutation, heterozygous [D68.51]  Yes    MARIAA (acute kidney injury) [N17.9]  Yes    Primary hypertension [I10]  Yes    PTSD (post-traumatic stress disorder) [F43.10]  Yes    History of DVT in adulthood [Z86.718]  Not Applicable      Resolved Hospital Problems   No resolved problems to display.        Brief Hospital Course to date:  Natalia Arzate is a 39 y.o. female with a history of HLD, Hypothyroidism, Afib, PE/DVT, Factor V Leiden mutation, and stroke who presented to [facility] with altered mental status. Labs there were significant for renal failure, hyperglycemia, and hypercapnic respiratory failure. Dx with  pneumonia and  resp failure.  She was intubated and required the initiation of vasopressors. She was transferred to [facility] on 1/2/25 for ongoing care.    Acute hypoxic resp failure, improving  Severe sepsis with end organ failure- renal failure and resp failure, improved  MRSA PNA--s/p vanc/linezolid  ESBL Klebsiella PNA  S/p intubation, extubated from 1/1/2025 till 1/17/2025  Pt underwent bronch on 1/14 with significant mucus plugging, post CXR with improvement in L lung aeration.  S/P merrem, vancomycin and linezolid for ESBL pneumonia and MRSA pneumonia respectively  Currently on room air--cont pulmonary toilet, oxygen as needed.    Gemella sanguinis Bacteremia  1 bottle Gemella sanguinis--unclear significance  S/p 10 days amp/unasyn.    Hypoosmolar hyponatremia  Secondary to ongoing diuretic therapy with Bumex and diarrhea  Holding Bumex, getting IV albumin and monitor BMP   Sodium improved today.  Monitor.    MARIAA due to severe sepsis  S/P Lt nephrectomy in 2022  Pt required CRRT ~ 1/8/25-1/11/25   Renal function improving, no need for further HD  Monitor labs    Severe dysphagia  Keofeed discontinued yesterday.  Patient placed on modified diet.    Continue Zofran, reglan.  Trial of meclizine, as having some vertigo sx. slight improvement reported  Speech therapy following a modified diet.    Right arm pain  X-rays to forearm and humerus neg for fracture  PRN dilaudid IV  Pt is not able to fit in MRI  Doppler ultrasound right upper extremity with no evidence of DVT    Diarrhea, improved  That is likely secondary to tube feeds.  Now resolved after patient started eating by mouth.  Prior C diff neg  Continue probiotic, as needed Imodium and cholestyramine    Factor V Leiden mutation  History of PE/DVT  History of stroke  Continue home lovenox    DM  A1c 9.1%  Continue insulin Lantus, Premeal insulin and SSI    Hypertension  History A-fib  Continue metoprolol, norvasc  Switch Lovenox to Eliquis    History of PTSD  Continue home  cymbalta and elavil    Expected Discharge Location and Transportation: SNF  Expected Discharge   Expected Discharge Date: 1/30/2025; Expected Discharge Time:      VTE Prophylaxis:  Pharmacologic & mechanical VTE prophylaxis orders are present.         AM-PAC 6 Clicks Score (PT): 12 (01/28/25 0830)    CODE STATUS:   Code Status and Medical Interventions: CPR (Attempt to Resuscitate); Full Support   Ordered at: 01/02/25 1952     Code Status (Patient has no pulse and is not breathing):    CPR (Attempt to Resuscitate)     Medical Interventions (Patient has pulse or is breathing):    Full Support       Soo Casas MD  01/29/25

## 2025-01-30 LAB
ALBUMIN SERPL-MCNC: 4.1 G/DL (ref 3.5–5.2)
ALBUMIN/GLOB SERPL: 1.1 G/DL
ALP SERPL-CCNC: 136 U/L (ref 39–117)
ALT SERPL W P-5'-P-CCNC: 35 U/L (ref 1–33)
ANION GAP SERPL CALCULATED.3IONS-SCNC: 16 MMOL/L (ref 5–15)
AST SERPL-CCNC: 60 U/L (ref 1–32)
BASOPHILS # BLD AUTO: 0.03 10*3/MM3 (ref 0–0.2)
BASOPHILS NFR BLD AUTO: 0.8 % (ref 0–1.5)
BILIRUB SERPL-MCNC: 0.4 MG/DL (ref 0–1.2)
BUN SERPL-MCNC: 45 MG/DL (ref 6–20)
BUN/CREAT SERPL: 47.4 (ref 7–25)
CALCIUM SPEC-SCNC: 10.1 MG/DL (ref 8.6–10.5)
CHLORIDE SERPL-SCNC: 93 MMOL/L (ref 98–107)
CO2 SERPL-SCNC: 21 MMOL/L (ref 22–29)
CREAT SERPL-MCNC: 0.95 MG/DL (ref 0.57–1)
DEPRECATED RDW RBC AUTO: 59.6 FL (ref 37–54)
EGFRCR SERPLBLD CKD-EPI 2021: 78.8 ML/MIN/1.73
EOSINOPHIL # BLD AUTO: 0.27 10*3/MM3 (ref 0–0.4)
EOSINOPHIL NFR BLD AUTO: 6.9 % (ref 0.3–6.2)
ERYTHROCYTE [DISTWIDTH] IN BLOOD BY AUTOMATED COUNT: 17.5 % (ref 12.3–15.4)
GLOBULIN UR ELPH-MCNC: 3.6 GM/DL
GLUCOSE BLDC GLUCOMTR-MCNC: 170 MG/DL (ref 70–130)
GLUCOSE BLDC GLUCOMTR-MCNC: 206 MG/DL (ref 70–130)
GLUCOSE BLDC GLUCOMTR-MCNC: 220 MG/DL (ref 70–130)
GLUCOSE BLDC GLUCOMTR-MCNC: 242 MG/DL (ref 70–130)
GLUCOSE SERPL-MCNC: 306 MG/DL (ref 65–99)
HCT VFR BLD AUTO: 26 % (ref 34–46.6)
HGB BLD-MCNC: 8.4 G/DL (ref 12–15.9)
IMM GRANULOCYTES # BLD AUTO: 0.01 10*3/MM3 (ref 0–0.05)
IMM GRANULOCYTES NFR BLD AUTO: 0.3 % (ref 0–0.5)
LYMPHOCYTES # BLD AUTO: 1.27 10*3/MM3 (ref 0.7–3.1)
LYMPHOCYTES NFR BLD AUTO: 32.2 % (ref 19.6–45.3)
MAGNESIUM SERPL-MCNC: 1.1 MG/DL (ref 1.6–2.6)
MCH RBC QN AUTO: 30.1 PG (ref 26.6–33)
MCHC RBC AUTO-ENTMCNC: 32.3 G/DL (ref 31.5–35.7)
MCV RBC AUTO: 93.2 FL (ref 79–97)
MONOCYTES # BLD AUTO: 0.56 10*3/MM3 (ref 0.1–0.9)
MONOCYTES NFR BLD AUTO: 14.2 % (ref 5–12)
NEUTROPHILS NFR BLD AUTO: 1.8 10*3/MM3 (ref 1.7–7)
NEUTROPHILS NFR BLD AUTO: 45.6 % (ref 42.7–76)
NRBC BLD AUTO-RTO: 0 /100 WBC (ref 0–0.2)
PLATELET # BLD AUTO: 162 10*3/MM3 (ref 140–450)
PMV BLD AUTO: 9.7 FL (ref 6–12)
POTASSIUM SERPL-SCNC: 4.5 MMOL/L (ref 3.5–5.2)
PROT SERPL-MCNC: 7.7 G/DL (ref 6–8.5)
RBC # BLD AUTO: 2.79 10*6/MM3 (ref 3.77–5.28)
SODIUM SERPL-SCNC: 130 MMOL/L (ref 136–145)
WBC NRBC COR # BLD AUTO: 3.94 10*3/MM3 (ref 3.4–10.8)

## 2025-01-30 PROCEDURE — 25010000002 MAGNESIUM SULFATE 2 GM/50ML SOLUTION: Performed by: INTERNAL MEDICINE

## 2025-01-30 PROCEDURE — 82948 REAGENT STRIP/BLOOD GLUCOSE: CPT

## 2025-01-30 PROCEDURE — 97530 THERAPEUTIC ACTIVITIES: CPT

## 2025-01-30 PROCEDURE — 92526 ORAL FUNCTION THERAPY: CPT

## 2025-01-30 PROCEDURE — 83735 ASSAY OF MAGNESIUM: CPT | Performed by: INTERNAL MEDICINE

## 2025-01-30 PROCEDURE — 85025 COMPLETE CBC W/AUTO DIFF WBC: CPT | Performed by: INTERNAL MEDICINE

## 2025-01-30 PROCEDURE — 25010000002 METOCLOPRAMIDE PER 10 MG: Performed by: PEDIATRICS

## 2025-01-30 PROCEDURE — 80053 COMPREHEN METABOLIC PANEL: CPT | Performed by: INTERNAL MEDICINE

## 2025-01-30 PROCEDURE — 99232 SBSQ HOSP IP/OBS MODERATE 35: CPT | Performed by: INTERNAL MEDICINE

## 2025-01-30 PROCEDURE — 63710000001 INSULIN LISPRO (HUMAN) PER 5 UNITS

## 2025-01-30 PROCEDURE — 63710000001 INSULIN GLARGINE PER 5 UNITS: Performed by: INTERNAL MEDICINE

## 2025-01-30 PROCEDURE — 63710000001 INSULIN LISPRO (HUMAN) PER 5 UNITS: Performed by: INTERNAL MEDICINE

## 2025-01-30 PROCEDURE — 25010000002 HYDROMORPHONE PER 4 MG: Performed by: INTERNAL MEDICINE

## 2025-01-30 RX ORDER — METOCLOPRAMIDE 10 MG/1
10 TABLET ORAL
Status: DISCONTINUED | OUTPATIENT
Start: 2025-01-30 | End: 2025-02-03 | Stop reason: HOSPADM

## 2025-01-30 RX ORDER — OXYCODONE HCL 5 MG/5 ML
5 SOLUTION, ORAL ORAL EVERY 4 HOURS PRN
Status: DISCONTINUED | OUTPATIENT
Start: 2025-01-30 | End: 2025-02-01

## 2025-01-30 RX ORDER — MAGNESIUM SULFATE HEPTAHYDRATE 40 MG/ML
2 INJECTION, SOLUTION INTRAVENOUS
Status: COMPLETED | OUTPATIENT
Start: 2025-01-30 | End: 2025-01-30

## 2025-01-30 RX ADMIN — HYDROMORPHONE HYDROCHLORIDE 0.5 MG: 1 INJECTION, SOLUTION INTRAMUSCULAR; INTRAVENOUS; SUBCUTANEOUS at 16:38

## 2025-01-30 RX ADMIN — NYSTATIN: 100000 POWDER TOPICAL at 08:22

## 2025-01-30 RX ADMIN — GABAPENTIN 300 MG: 300 CAPSULE ORAL at 20:37

## 2025-01-30 RX ADMIN — HYDROMORPHONE HYDROCHLORIDE 0.5 MG: 1 INJECTION, SOLUTION INTRAMUSCULAR; INTRAVENOUS; SUBCUTANEOUS at 08:11

## 2025-01-30 RX ADMIN — DULOXETINE HYDROCHLORIDE 60 MG: 60 CAPSULE, DELAYED RELEASE ORAL at 08:21

## 2025-01-30 RX ADMIN — METOCLOPRAMIDE 10 MG: 10 TABLET ORAL at 18:25

## 2025-01-30 RX ADMIN — HYDROMORPHONE HYDROCHLORIDE 0.5 MG: 1 INJECTION, SOLUTION INTRAMUSCULAR; INTRAVENOUS; SUBCUTANEOUS at 20:37

## 2025-01-30 RX ADMIN — INSULIN LISPRO 8 UNITS: 100 INJECTION, SOLUTION INTRAVENOUS; SUBCUTANEOUS at 08:22

## 2025-01-30 RX ADMIN — Medication 10 ML: at 20:38

## 2025-01-30 RX ADMIN — INSULIN LISPRO 8 UNITS: 100 INJECTION, SOLUTION INTRAVENOUS; SUBCUTANEOUS at 18:27

## 2025-01-30 RX ADMIN — COLLAGENASE SANTYL 1 APPLICATION: 250 OINTMENT TOPICAL at 08:21

## 2025-01-30 RX ADMIN — INSULIN GLARGINE 50 UNITS: 100 INJECTION, SOLUTION SUBCUTANEOUS at 20:38

## 2025-01-30 RX ADMIN — MAGNESIUM SULFATE HEPTAHYDRATE 2 G: 2 INJECTION, SOLUTION INTRAVENOUS at 08:10

## 2025-01-30 RX ADMIN — AMLODIPINE BESYLATE 10 MG: 10 TABLET ORAL at 08:22

## 2025-01-30 RX ADMIN — METOCLOPRAMIDE HYDROCHLORIDE 10 MG: 10 INJECTION, SOLUTION INTRAMUSCULAR; INTRAVENOUS at 10:36

## 2025-01-30 RX ADMIN — Medication 5 MG: at 20:37

## 2025-01-30 RX ADMIN — INSULIN LISPRO 4 UNITS: 100 INJECTION, SOLUTION INTRAVENOUS; SUBCUTANEOUS at 12:30

## 2025-01-30 RX ADMIN — HYDROMORPHONE HYDROCHLORIDE 0.5 MG: 1 INJECTION, SOLUTION INTRAMUSCULAR; INTRAVENOUS; SUBCUTANEOUS at 12:29

## 2025-01-30 RX ADMIN — METOPROLOL SUCCINATE 50 MG: 50 TABLET, EXTENDED RELEASE ORAL at 20:37

## 2025-01-30 RX ADMIN — INSULIN GLARGINE 50 UNITS: 100 INJECTION, SOLUTION SUBCUTANEOUS at 08:21

## 2025-01-30 RX ADMIN — Medication 10 ML: at 08:11

## 2025-01-30 RX ADMIN — HYDROMORPHONE HYDROCHLORIDE 0.5 MG: 1 INJECTION, SOLUTION INTRAMUSCULAR; INTRAVENOUS; SUBCUTANEOUS at 03:41

## 2025-01-30 RX ADMIN — MAGNESIUM SULFATE HEPTAHYDRATE 2 G: 2 INJECTION, SOLUTION INTRAVENOUS at 10:35

## 2025-01-30 RX ADMIN — HYDROCODONE BITARTRATE AND ACETAMINOPHEN 1 TABLET: 5; 325 TABLET ORAL at 15:48

## 2025-01-30 RX ADMIN — DULOXETINE HYDROCHLORIDE 30 MG: 30 CAPSULE, DELAYED RELEASE ORAL at 20:37

## 2025-01-30 RX ADMIN — HYDROXYZINE HYDROCHLORIDE 25 MG: 25 TABLET ORAL at 20:37

## 2025-01-30 RX ADMIN — OXYCODONE HYDROCHLORIDE 5 MG: 5 SOLUTION ORAL at 18:27

## 2025-01-30 RX ADMIN — INSULIN LISPRO 20 UNITS: 100 INJECTION, SOLUTION INTRAVENOUS; SUBCUTANEOUS at 12:30

## 2025-01-30 RX ADMIN — GABAPENTIN 300 MG: 300 CAPSULE ORAL at 05:09

## 2025-01-30 RX ADMIN — INSULIN LISPRO 20 UNITS: 100 INJECTION, SOLUTION INTRAVENOUS; SUBCUTANEOUS at 08:23

## 2025-01-30 RX ADMIN — Medication 1 CAPSULE: at 08:22

## 2025-01-30 RX ADMIN — GABAPENTIN 300 MG: 300 CAPSULE ORAL at 15:34

## 2025-01-30 RX ADMIN — METOCLOPRAMIDE HYDROCHLORIDE 10 MG: 10 INJECTION, SOLUTION INTRAMUSCULAR; INTRAVENOUS at 05:09

## 2025-01-30 RX ADMIN — OXYCODONE HYDROCHLORIDE 5 MG: 5 SOLUTION ORAL at 10:41

## 2025-01-30 RX ADMIN — APIXABAN 5 MG: 5 TABLET, FILM COATED ORAL at 20:37

## 2025-01-30 RX ADMIN — MAGNESIUM SULFATE HEPTAHYDRATE 2 G: 2 INJECTION, SOLUTION INTRAVENOUS at 12:30

## 2025-01-30 RX ADMIN — NYSTATIN: 100000 POWDER TOPICAL at 20:39

## 2025-01-30 RX ADMIN — INSULIN LISPRO 20 UNITS: 100 INJECTION, SOLUTION INTRAVENOUS; SUBCUTANEOUS at 18:25

## 2025-01-30 RX ADMIN — AMITRIPTYLINE HYDROCHLORIDE 25 MG: 25 TABLET, FILM COATED ORAL at 20:37

## 2025-01-30 RX ADMIN — CHOLESTYRAMINE 4 G: 4 POWDER, FOR SUSPENSION ORAL at 05:09

## 2025-01-30 RX ADMIN — METOPROLOL SUCCINATE 50 MG: 50 TABLET, EXTENDED RELEASE ORAL at 08:22

## 2025-01-30 RX ADMIN — INSULIN LISPRO 8 UNITS: 100 INJECTION, SOLUTION INTRAVENOUS; SUBCUTANEOUS at 20:38

## 2025-01-30 RX ADMIN — APIXABAN 5 MG: 5 TABLET, FILM COATED ORAL at 08:22

## 2025-01-30 NOTE — PLAN OF CARE
Goal Outcome Evaluation:  Plan of Care Reviewed With: patient        Progress: improving       Anticipated Discharge Disposition (SLP): inpatient rehabilitation facility             Treatment Assessment (SLP): continued, suspected, pharyngeal dysphagia (01/30/25 0816)  Treatment Assessment Comments (SLP): Pt and RN reported toleration of current diet without difficulty or overt s/sx aspiration. Pt accepted ice chips and thin liquids via spoon with wet vocal quality noted x2/15 trials; cleared to baseline after cued cough. Pt accepted honey thick liquids via straw and meds whole (provided per RN) with honey thick liquids with no overt s/sx aspiration. SLP recs continue with current diet and repeat FEES for possible diet advancement on 1/31. All in agreement. (01/30/25 0816)  Plan for Continued Treatment (SLP): continue treatment per plan of care (01/30/25 0816)

## 2025-01-30 NOTE — NURSING NOTE
WOC follow-up for full-thickness wound to left back/flank area    Patient's full-thickness wound measures 1 x 15 x 0.25 cm.  There remains directly within her skin folds along her back.    I was able to visualize healthy tissue below the slough that is primarily covering the entire wound at this time.  Santyl was ordered yesterday to assist with enzymatic debridement of the nonviable tissue and to promote wound healing.    Her wound was cleansed today with Vashe followed by Vashe soak for 5 minutes.  Santyl was then applied to the nonviable tissue, covered with normal saline moistened 4 x 4 gauze and secure with Optifoam dressing.    PT wound still follows for mist therapy to the site.    See wound care orders.    Pressure injury prevention protocol throughout hospitalization.    WOC to follow.    Miles Tanner RN, BSN, CWOCN  Wound, Ostomy and Continence (WOC) Department  [facility]

## 2025-01-30 NOTE — PROGRESS NOTES
[facility] Medicine Services  PROGRESS NOTE    Patient Name: Natalia Arzate  : 1985  MRN: [id number]    Date of Admission: 2025  Primary Care Physician: Bladimir Calle PA-C    Subjective   Subjective     CC:  Resp failure    HPI:  Patient seen and examined this morning.  Feeling better day of the day.  Sitting up in the bed and comfortable.  No shortness of breath or chest pain.  Tolerating regular diet well with no issues.  Currently awaiting rehab    Objective   Objective     Vital Signs:   Temp:  [97 °F (36.1 °C)-98.4 °F (36.9 °C)] 97.6°F (36.9 °C)  Heart Rate:  [] 91  Resp:  [18] 18  BP: (127-132)/() 127/81     Physical Exam:  General: morbidly obese , not in distress, conversant and cooperative  Head: Atraumatic and normocephalic  Eyes: No Icterus. No pallor  Ears:  Ears appear intact with no abnormalities noted  Throat: No oral lesions, no thrush  Neck: Supple, trachea midline  Lungs: Clear to auscultation bilaterally, equal air entry, no wheezing or crackles  Heart:  Normal S1 and S2, no murmur, no gallop, No JVD, 2+ lower extremity swelling  Abdomen:  Soft, no tenderness, no organomegaly, normal bowel sounds, no organomegaly  Extremities: pulses equal bilaterally  Skin: No bleeding, bruising or rash, normal skin turgor and elasticity  Neurologic: Cranial nerves appear intact with no evidence of facial asymmetry, normal motor and sensory functions in all 4 extremities  Psych: Alert and oriented x 3, normal mood     Results Reviewed:  LAB RESULTS:      Lab 25  0342 25  1603 25  0554 25  0702 25  0552 25  0224 25  1603   WBC 3.94  --  5.58  --   --  6.05  --    HEMOGLOBIN 8.4*  --  9.0*  --   --  9.7*  --    HEMATOCRIT 26.0*  --  27.6*  --   --  30.1*  --    PLATELETS 162  --  167  --   --  188  --    NEUTROS ABS 1.80  --  3.18  --   --  3.57  --    IMMATURE GRANS (ABS) 0.01  --  0.02  --   --  0.01  --     LYMPHS ABS 1.27  --  1.47  --   --  1.48  --    MONOS ABS 0.56  --  0.52  --   --  0.57  --    EOS ABS 0.27  --  0.33  --   --  0.38  --    MCV 93.2  --  90.2  --   --  90.7  --    CRP  --   --   --   --   --  3.58* 2.18*   HEPARIN ANTI-XA  --  0.90  --   --   --   --   --    HSTROP T  --   --   --  36* 34*  --   --          Lab 01/30/25  0342 01/27/25  0554 01/26/25 0224 01/25/25  0627 01/24/25  1603   SODIUM 130* 131* 126*  --  132*   POTASSIUM 4.5 4.7 4.1  --  4.5   CHLORIDE 93* 91* 86*  --  90*   CO2 21.0* 24.0 27.0  --  28.0   ANION GAP 16.0* 16.0* 13.0  --  14.0   BUN 45* 36* 28*  --  22*   CREATININE 0.95 0.63 0.58  --  0.52*   EGFR 78.8 116.6 119.0  --  122.1   GLUCOSE 306* 235* 241*  --  231*   CALCIUM 10.1 10.8* 10.8*  --  10.4   MAGNESIUM 1.1* 1.6 1.2* 1.8 1.2*   PHOSPHORUS  --   --  4.5  --  5.0*         Lab 01/30/25  0342 01/26/25 0224 01/24/25  1603   TOTAL PROTEIN 7.7 8.1 8.1   ALBUMIN 4.1 4.1 3.9   GLOBULIN 3.6 4.0 4.2   ALT (SGPT) 35* 38* 35*   AST (SGOT) 60* 64* 70*   BILIRUBIN 0.4 0.6 0.5   ALK PHOS 136* 140* 134*         Lab 01/26/25  0702 01/26/25  0552   HSTROP T 36* 34*         Lab 01/24/25  1603   CHOLESTEROL 264*   TRIGLYCERIDES 739*                 Brief Urine Lab Results  (Last result in the past 365 days)        Color   Clarity   Blood   Leuk Est   Nitrite   Protein   CREAT   Urine HCG        01/02/25 1213             52.3         01/02/25 1213 Yellow   Cloudy   Moderate (2+)   Negative   Negative   100 mg/dL (2+)                   Microbiology Results Abnormal       Procedure Component Value - Date/Time    Respiratory Culture - Wash, Lung, Left Lower Lobe [[id number]]  (Abnormal)  (Susceptibility) Collected: 01/14/25 1608    Lab Status: Final result Specimen: Wash from Lung, Left Lower Lobe Updated: 01/17/25 1221     Respiratory Culture Light growth (2+) Klebsiella pneumoniae ESBL     Comment:   Consider infectious disease consult.  Susceptibility results may not correlate to clinical  outcomes.         Light growth (2+) Staphylococcus aureus, MRSA     Comment:   Considering site of infection and appropriate dosing, oxacillin (or cefoxitin) results can be applied to cefazolin, nafcillin, ampicillin/sulbactam, dicloxacillin, amoxicillin/clavulanate, cephalexin, and cefpodoxime.  Methicillin resistant Staphylococcus aureus, Patient may be an isolation risk.         Rare growth Normal Respiratory Margo     Gram Stain Many (4+) WBCs per low power field      Rare (1+) Epithelial cells per low power field      Rare (1+) Gram positive cocci in pairs and clusters    Susceptibility        Klebsiella pneumoniae ESBL      KELSIE      Ciprofloxacin Resistant      Ertapenem Susceptible      Levofloxacin Resistant      Meropenem Susceptible      Tetracycline Resistant      Trimethoprim + Sulfamethoxazole Resistant                       Susceptibility        Staphylococcus aureus, MRSA      KELSIE      Clindamycin Resistant      Linezolid Susceptible      Oxacillin Resistant      Tetracycline Susceptible      Trimethoprim + Sulfamethoxazole Susceptible      Vancomycin Susceptible                       Susceptibility Comments       Klebsiella pneumoniae ESBL    With the exception of urinary-sourced infections, aminoglycosides should not be used as monotherapy.      Staphylococcus aureus, MRSA    This isolate is presumed to be clindamycin resistant based on detection of inducible clindamycin resistance.  Clindamycin may still be effective in some patients.  This isolate is presumed to be clindamycin resistant based on detection of inducible clindamycin resistance.  Clindamycin may still be effective in some patients.               Respiratory Culture - Sputum, ET Suction [[id number]]  (Abnormal) Collected: 01/14/25 8460    Lab Status: Final result Specimen: Sputum from ET Suction Updated: 01/17/25 1221     Respiratory Culture Light growth (2+) Klebsiella pneumoniae ESBL     Comment:   Consider infectious disease consult.   Susceptibility results may not correlate to clinical outcomes.         Light growth (2+) Staphylococcus aureus, MRSA     Comment:   Considering site of infection and appropriate dosing, oxacillin (or cefoxitin) results can be applied to cefazolin, nafcillin, ampicillin/sulbactam, dicloxacillin, amoxicillin/clavulanate, cephalexin, and cefpodoxime.  Methicillin resistant Staphylococcus aureus, Patient may be an isolation risk.        Gram Stain Many (4+) WBCs per low power field      Rare (1+) Epithelial cells per low power field      Few (2+) Gram positive cocci in pairs, chains and clusters    Narrative:      Refer to LLL Wash culture for MICS.     Blood Culture - Blood, Blood, Arterial Line [[id number]]  (Abnormal) Collected: 01/14/25 1434    Lab Status: Final result Specimen: Blood, Arterial Line Updated: 01/17/25 0636     Blood Culture Staphylococcus, coagulase negative     Isolated from Anaerobic Bottle     Gram Stain Anaerobic Bottle Gram positive cocci in pairs and clusters    Narrative:      Probable contaminant requires clinical correlation, susceptibility not performed unless requested by physician.      Blood Culture ID, PCR - Blood, Blood, Arterial Line [[id number]]  (Abnormal) Collected: 01/14/25 1434    Lab Status: Final result Specimen: Blood, Arterial Line Updated: 01/16/25 0657     BCID, PCR Staph spp, not aureus or lugdunensis. Identification by BCID2 PCR.     BOTTLE TYPE Anaerobic Bottle    Blood Culture - Blood, Arm, Left [[id number]]  (Abnormal) Collected: 01/05/25 1305    Lab Status: Final result Specimen: Blood from Arm, Left Updated: 01/07/25 1100     Blood Culture Staphylococcus, coagulase negative     Isolated from Anaerobic Bottle     Gram Stain Anaerobic Bottle Gram positive cocci in clusters    Narrative:      Less than seven (7) mL's of blood was collected.  Insufficient quantity may yield false negative results.  Probable contaminant requires clinical correlation, susceptibility not  performed unless requested by physician.    Blood Culture ID, PCR - Blood, Arm, Left [[id number]]  (Abnormal) Collected: 01/05/25 1305    Lab Status: Final result Specimen: Blood from Arm, Left Updated: 01/06/25 1435     BCID, PCR Staph spp, not aureus or lugdunensis. Identification by BCID2 PCR.     BOTTLE TYPE Anaerobic Bottle    Respiratory Culture - Bronchial Wash, Lung, Left Lower Lobe [[id number]]  (Abnormal)  (Susceptibility) Collected: 01/04/25 0755    Lab Status: Final result Specimen: Bronchial Wash from Lung, Left Lower Lobe Updated: 01/06/25 0908     Respiratory Culture Scant growth (1+) Staphylococcus aureus, MRSA     Comment:   Methicillin resistant Staphylococcus aureus, Patient may be an isolation risk.         No Normal Respiratory Margo     Gram Stain Moderate (3+) WBCs seen      Rare (1+) Gram positive cocci in pairs and clusters    Susceptibility        Staphylococcus aureus, MRSA      KELSIE      Clindamycin Resistant      Linezolid Susceptible      Oxacillin Resistant      Tetracycline Susceptible      Trimethoprim + Sulfamethoxazole Susceptible      Vancomycin Susceptible                       Susceptibility Comments       Staphylococcus aureus, MRSA    This isolate is presumed to be clindamycin resistant based on detection of inducible clindamycin resistance.  Clindamycin may still be effective in some patients.  This isolate is presumed to be clindamycin resistant based on detection of inducible clindamycin resistance.  Clindamycin may still be effective in some patients.               Respiratory Culture - Sputum, ET Suction [[id number]]  (Abnormal)  (Susceptibility) Collected: 01/02/25 1212    Lab Status: Final result Specimen: Sputum from ET Suction Updated: 01/04/25 0939     Respiratory Culture Moderate growth (3+) Staphylococcus aureus, MRSA      No Normal Respiratory Margo     Gram Stain Many (4+) WBCs per low power field      Rare (1+) Epithelial cells per low power field      Many (4+)  Gram positive cocci in pairs and clusters    Susceptibility        Staphylococcus aureus, MRSA      KELSIE      Clindamycin Resistant      Linezolid Susceptible      Oxacillin Resistant      Tetracycline Susceptible      Trimethoprim + Sulfamethoxazole Susceptible      Vancomycin Susceptible                       Susceptibility Comments       Staphylococcus aureus, MRSA    This isolate is presumed to be clindamycin resistant based on detection of inducible clindamycin resistance.  Clindamycin may still be effective in some patients.               MRSA Screen, PCR (Inpatient) - Swab, Nares [[id number]]  (Abnormal) Collected: 01/02/25 1246    Lab Status: Final result Specimen: Swab from Nares Updated: 01/02/25 1504     MRSA PCR Positive    Narrative:      The negative predictive value of this diagnostic test is high and should only be used to consider de-escalating anti-MRSA therapy. A positive result may indicate colonization with MRSA and must be correlated clinically.            No radiology results from the last 24 hrs    Results for orders placed during the hospital encounter of 01/02/25    Adult Transthoracic Echo Complete W/ Cont if Necessary Per Protocol    Interpretation Summary    Left ventricular systolic function is normal. Calculated left ventricular EF = 59.3% Left ventricular ejection fraction appears to be 61 - 65%.    Left ventricular wall thickness is consistent with borderline concentric hypertrophy.    Left ventricular diastolic function was normal.    Mild aortic valve stenosis is present.    Peak velocity of the flow distal to the aortic valve is 292 cm/s. Aortic valve mean pressure gradient is 20 mmHg.    Estimated right ventricular systolic pressure from tricuspid regurgitation is normal (<35 mmHg).      Current medications:  Scheduled Meds:amitriptyline, 25 mg, Oral, Nightly  amLODIPine, 10 mg, Oral, Daily  apixaban, 5 mg, Oral, Q12H  cholestyramine light, 1 packet, Oral, Q8H  collagenase, 1  Application, Topical, Q24H  DULoxetine, 30 mg, Oral, Nightly  DULoxetine, 60 mg, Oral, Daily  gabapentin, 300 mg, Oral, Q8H  hydrOXYzine, 25 mg, Oral, Nightly  insulin glargine, 50 Units, Subcutaneous, Q12H  Insulin Lispro, 20 Units, Subcutaneous, TID With Meals  insulin lispro, 4-24 Units, Subcutaneous, 4x Daily AC & at Bedtime  lactobacillus acidophilus, 1 capsule, Oral, Daily  magnesium sulfate, 2 g, Intravenous, Q2H  metoclopramide, 10 mg, Intravenous, Q6H  metoprolol succinate XL, 50 mg, Oral, BID  Nutrisource fiber, 1 packet, Per G Tube, TID  nystatin, , Topical, Q12H  Psyllium, 1 packet, Oral, BID      Continuous Infusions:   PRN Meds:.  acetaminophen    dextrose    dextrose    glucagon (human recombinant)    HYDROcodone-acetaminophen    HYDROmorphone    ipratropium    ipratropium-albuterol    loperamide    Magnesium Standard Dose Replacement - Follow Nurse / BPA Driven Protocol    meclizine    melatonin    midazolam    ondansetron    oxyCODONE    Polyvinyl Alcohol-Povidone PF    Potassium Replacement - Follow Nurse / BPA Driven Protocol    sodium chloride    sodium chloride    Assessment & Plan   Assessment & Plan     Active Hospital Problems    Diagnosis  POA    **Acute respiratory failure with hypercapnia [J96.02]  Yes    Sepsis [A41.9]  Yes    Type 2 diabetes mellitus with hyperglycemia [E11.65]  Yes    Hypothyroid [E03.9]  Yes    Factor 5 Leiden mutation, heterozygous [D68.51]  Yes    MARIAA (acute kidney injury) [N17.9]  Yes    Primary hypertension [I10]  Yes    PTSD (post-traumatic stress disorder) [F43.10]  Yes    History of DVT in adulthood [Z86.718]  Not Applicable      Resolved Hospital Problems   No resolved problems to display.        Brief Hospital Course to date:  Natalia Arzate is a 39 y.o. female with a history of HLD, Hypothyroidism, Afib, PE/DVT, Factor V Leiden mutation, and stroke who presented to [facility] with altered mental status. Labs there were significant for renal failure,  hyperglycemia, and hypercapnic respiratory failure. Dx with  pneumonia and resp failure.  She was intubated and required the initiation of vasopressors. She was transferred to [facility] on 1/2/25 for ongoing care.    Acute hypoxic resp failure, improving  Severe sepsis with end organ failure- renal failure and resp failure, improved  MRSA PNA--s/p vanc/linezolid  ESBL Klebsiella PNA  S/p intubation, extubated from 1/1/2025 till 1/17/2025  Pt underwent bronch on 1/14 with significant mucus plugging, post CXR with improvement in L lung aeration.  S/P merrem, vancomycin and linezolid for ESBL pneumonia and MRSA pneumonia respectively  Currently on room air--cont pulmonary toilet, oxygen as needed.    Gemella sanguinis Bacteremia  1 bottle Gemella sanguinis--unclear significance  S/p 10 days amp/unasyn.    Hypoosmolar hyponatremia, improved  Secondary to ongoing diuretic therapy with Bumex and diarrhea   improved with holding diuretics and after her diarrhea improved after switching to regular diet from Keofeed  Sodium improved and back to baseline    MARIAA due to severe sepsis  S/P Lt nephrectomy in 2022  Pt required CRRT ~ 1/8/25-1/11/25   Renal function improving, no need for further HD  Monitor labs    Severe dysphagia  Keofeed discontinued   Currently tolerating modified diet with honey thick liquid    Right arm pain, improved  X-rays to forearm and humerus neg for fracture  PRN dilaudid IV  Pt is not able to fit in MRI  Doppler ultrasound right upper extremity with no evidence of DVT    Diarrhea, improved  Most likely secondary to tube feeds.  Now resolved after patient started eating by mouth.  Prior C diff neg  Continue probiotic, as needed Imodium and cholestyramine    Factor V Leiden mutation  History of PE/DVT  History of stroke  Continue home lovenox    DM  A1c 9.1%  Continue insulin Lantus, Premeal insulin and SSI    Hypertension  History A-fib  Continue metoprolol, norvasc  Switch Lovenox to Eliquis    History of  PTSD  Continue home cymbalta and elavil    Expected Discharge Location and Transportation: SNF  Expected Discharge   Expected Discharge Date: 1/30/2025; Expected Discharge Time:      VTE Prophylaxis:  Pharmacologic & mechanical VTE prophylaxis orders are present.         AM-PAC 6 Clicks Score (PT): 12 (01/28/25 0830)    CODE STATUS:   Code Status and Medical Interventions: CPR (Attempt to Resuscitate); Full Support   Ordered at: 01/02/25 1952     Code Status (Patient has no pulse and is not breathing):    CPR (Attempt to Resuscitate)     Medical Interventions (Patient has pulse or is breathing):    Full Support       Soo Casas MD  01/30/25

## 2025-01-30 NOTE — CASE MANAGEMENT/SOCIAL WORK
Continued Stay Note  [facility]     Patient Name: Natalia Arzate  MRN: [id number]  Today's Date: 1/30/2025    Admit Date: 1/2/2025    Plan: [facility]   Discharge Plan       Row Name 01/30/25 1157       Plan    Plan [facility]    Plan Comments Glucose is better (206 this AM). Mag (1.1) is being replaced. She was a max assist to side step to the [facility] yesterday. She is on 2L NC (baseline). Precert was initiated today for [facility]. Update was provided to Pt, in room. CM will continue to follow.    Final Discharge Disposition Code 03 - skilled nursing facility (SNF)                   Discharge Codes    No documentation.                 Expected Discharge Date and Time       Expected Discharge Date Expected Discharge Time    Jan 31, 2025               Pallavi Donohue RN

## 2025-01-30 NOTE — THERAPY TREATMENT NOTE
Acute Care - Speech Language Pathology   Swallow Treatment Note [facility]     Patient Name: Natalia Arzate  : 1985  MRN: [id number]  Today's Date: 2025               Admit Date: 2025    Visit Dx:     ICD-10-CM ICD-9-CM   1. Respiratory acidosis with metabolic acidosis  E87.4 276.3   2. Acute respiratory failure with hypoxia  J96.01 518.81   3. MARIAA (acute kidney injury)  N17.9 584.9   4. Pharyngeal dysphagia  R13.13 787.23     Patient Active Problem List   Diagnosis    Nephrolithiasis    Ovarian teratoma, right    Other chronic pain    Pelvic pain    History of DVT in adulthood    History of pulmonary embolism    Ureteral calculus    Ischemic cerebrovascular accident (CVA)    Primary hypertension    Left lumbar radiculopathy    PTSD (post-traumatic stress disorder)    MARIAA (acute kidney injury)    Anemia    Dyslipidemia    Hypothyroid    Other secondary gout, multiple sites    Factor 5 Leiden mutation, heterozygous    Vitamin D deficiency    Vitamin B 12 deficiency    Type 2 diabetes mellitus with hyperglycemia    Hoarseness, persistent    Ureterolithiasis    Acute cystitis without hematuria    Hepatic steatosis    Right flank pain, chronic    Detrusor instability of bladder    Frequency of micturition    Acute respiratory failure with hypercapnia    Hyperkalemia    Sepsis    Respiratory acidosis with metabolic acidosis    Acute respiratory failure    Diabetes mellitus type 2, insulin dependent    Diabetic peripheral neuropathy associated with type 2 diabetes mellitus     Past Medical History:   Diagnosis Date    A-fib     Abnormal ECG     Anemia     Anxiety     Asthma     Cancer     Ovarian    Depression     Diabetes mellitus     DVT (deep venous thrombosis)     Factor 5 Leiden mutation, heterozygous     Fibroid     GERD (gastroesophageal reflux disease)     Gout     H/O abdominal abscess     History of sepsis     History of transfusion     Hyperlipidemia     Hypothyroid     Kidney stone      Migraines     Neuropathy     Ovarian cancer 2021    Ovarian cyst     PE (pulmonary embolism)     Polycystic ovary syndrome     Preeclampsia     Rh incompatibility     Stroke     TIA (transient ischemic attack)     Urinary tract infection     Varicella      Past Surgical History:   Procedure Laterality Date    ABDOMINAL SURGERY      CARDIAC CATHETERIZATION       SECTION      CHOLECYSTECTOMY      COLONOSCOPY      ENDOSCOPY      EXTRACORPOREAL SHOCK WAVE LITHOTRIPSY (ESWL) Left 10/22/2021    Procedure: EXTRACORPOREAL SHOCKWAVE LITHOTRIPSY;  Surgeon: Milan Motley MD;  Location: [facility] OR;  Service: Urology;  Laterality: Left;    HYSTERECTOMY  2017    ovarian ca    INSERT CENTRAL LINE AT BEDSIDE  2025    LITHOTRIPSY      NEPHRECTOMY Left 10/2022    RIGHT OOPHORECTOMY      URETEROSCOPY LASER LITHOTRIPSY WITH STENT INSERTION Left 10/01/2021    Procedure: URETEROSCOPY WITH STENT PLACEMENT;  Surgeon: Milan Motley MD;  Location: [facility] OR;  Service: Urology;  Laterality: Left;    URETEROSCOPY LASER LITHOTRIPSY WITH STENT INSERTION Left 2022    Procedure: URETEROSCOPY STENT REMOVAL;  Surgeon: Milan Motley MD;  Location: [facility] OR;  Service: Urology;  Laterality: Left;    URETEROSCOPY LASER LITHOTRIPSY WITH STENT INSERTION Left 2022    Procedure: CYSTOSCOPY RETROGRADE LEFT WITH STENT PLACEMENT;  Surgeon: Milan Motley MD;  Location: [facility] OR;  Service: Urology;  Laterality: Left;       SLP Recommendation and Plan     SLP Diet Recommendation: soft to chew textures, chopped, no mixed consistencies, honey thick liquids (25)  Recommended Precautions and Strategies: general aspiration precautions, upright posture during/after eating, small bites of food and sips of liquid, check mouth frequently for oral residue/pocketing, assist with feeding (25)  SLP Rec. for Method of Medication Administration: meds whole, meds crushed, with puree  (01/30/25 0816)     Monitor for Signs of Aspiration: yes, notify SLP if any concerns (01/30/25 0816)  Recommended Diagnostics: reassess via FEES, other (see comments) (1/31) (01/30/25 0816)     Anticipated Discharge Disposition (SLP): inpatient rehabilitation facility (01/30/25 0816)     Therapy Frequency (Swallow): 5 days per week (01/30/25 0816)  Predicted Duration Therapy Intervention (Days): 1 week (01/30/25 0816)  Oral Care Recommendations: Oral Care BID/PRN, Toothbrush (01/30/25 0816)        Daily Summary of Progress (SLP): progress toward functional goals is good (01/30/25 0816)               Treatment Assessment (SLP): continued, suspected, pharyngeal dysphagia (01/30/25 0816)  Treatment Assessment Comments (SLP): Pt and RN reported toleration of current diet without difficulty or overt s/sx aspiration. Pt accepted ice chips and thin liquids via spoon with wet vocal quality noted x2/15 trials; cleared to baseline after cued cough. Pt accepted honey thick liquids via straw and meds whole (provided per RN) with honey thick liquids with no overt s/sx aspiration. SLP recs continue with current diet and repeat FEES for possible diet advancement on 1/31. All in agreement. (01/30/25 0816)  Plan for Continued Treatment (SLP): continue treatment per plan of care (01/30/25 0816)         Progress: improving      SWALLOW EVALUATION (Last 72 Hours)       SLP Adult Swallow Evaluation       Row Name 01/30/25 0816 01/28/25 1400 01/27/25 1300             Rehab Evaluation    Document Type therapy note (daily note)  -MM therapy note (daily note)  -CH re-evaluation  -CJ      Subjective Information no complaints  -MM no complaints  -CH no complaints  -CJ      Patient Observations alert;cooperative  -MM alert;cooperative;agree to therapy  -CH alert;cooperative  -CJ      Patient/Family/Caregiver Comments/Observations S.O. present  -MM S.O. present  -CH fam present  -CJ      Patient Effort good  -MM good  -CH good  -CJ       Symptoms Noted During/After Treatment none  -MM none  - none  -      Oral Care -- oral rinse provided  - --         General Information    Patient Profile Reviewed yes  -MM yes  - yes  -CJ      Pertinent History Of Current Problem See initial eval  -MM -- see initial; CSE completed @ 1300 followed by FEES @ 1410  -      Current Method of Nutrition -- -- NPO;nasogastric feedings;small-bore  -      Precautions/Limitations, Vision -- -- WFL;for purposes of eval  -CJ      Precautions/Limitations, Hearing -- -- WFL;for purposes of eval  -CJ      Prior Level of Function-Communication -- -- unknown  -      Prior Level of Function-Swallowing -- -- other (see comments)  -      Plans/Goals Discussed with -- -- patient;agreed upon  -      Barriers to Rehab -- -- none identified  -      Patient's Goals for Discharge -- -- return to PO diet  -         Pain    Pretreatment Pain Rating 0/10 - no pain  -MM -- --      Posttreatment Pain Rating 0/10 - no pain  -MM -- --      Additional Documentation -- -- Pain Scale: FACES Pre/Post-Treatment (Group)  -         Pain Scale: FACES Pre/Post-Treatment    Pain: FACES Scale, Pretreatment -- 0-->no hurt  -CH 2-->hurts little bit  -      Posttreatment Pain Rating -- 0-->no hurt  -CH 2-->hurts little bit  -         Oral Motor Structure and Function    Secretion Management -- -- WNL/WFL  -      Mucosal Quality -- -- moist, healthy  -         General Eating/Swallowing Observations    Respiratory Support Currently in Use -- -- room air  -      Eating/Swallowing Skills -- -- self-fed;fed by SLP  -      Positioning During Eating -- -- upright 90 degree;upright in bed  -      Utensils Used -- -- spoon;cup;straw  -      Consistencies Trialed -- -- ice chips;thin liquids;nectar/syrup-thick liquids;pureed  -         Respiratory    Date of Intubation -- -- 1/1-1/17  -         Clinical Swallow Eval    Oral Prep Phase -- -- impaired  -      Oral Transit -- --  impaired  -CJ      Oral Residue -- -- WFL  -CJ      Pharyngeal Phase -- -- suspected pharyngeal impairment  -CJ      Esophageal Phase -- -- unremarkable  -CJ      Clinical Swallow Evaluation Summary -- -- Improved overall status, improved vocal quality. Cough intermittently w/ thins. Will complete FEES  -CJ         Fiberoptic Endoscopic Evaluation of Swallowing (FEES)    Risks/Benefits Reviewed -- -- risks/benefits explained;patient;agreed to eval  -      Nasal Entry -- -- right:  -         Anatomy and Physiology    Anatomic Considerations -- -- edema;erythema  -      Velopharyngeal -- -- WFL  -CJ      Base of Tongue -- -- symmetrical;range adequate  -      Epiglottis -- -- WFL  -      Laryngeal Function Breathing -- -- symmetrical  -      Laryngeal Function Phonation -- -- symmetrical  -      Laryngeal Function to Breath Hold -- -- TVF/FVF/Arytenoid;sustained closure  -      Secretion Rating Scale (Apollo et alJaya 1996) -- -- 0- normal, no visible secretions  -      Secretion Description -- -- thin;clear  -CJ      Ice Chips -- -- elicited swallow  -      Spontaneous Swallow -- -- frequency adequate  -      Sensory -- -- sensed scope  -      Utensils Used -- -- Spoon;Cup;Straw  -      Consistencies Trialed -- -- ice chips;thin liquids;nectar-thick liquids;honey-thick liquids;pudding/puree;regular textures  -         FEES Interpretation    Oral Phase -- -- prespill of liquids into pharynx  -CJ         Initiation of Pharyngeal Swallow    Initiation of Pharyngeal Swallow -- -- bolus in pyriform sinuses  -      Pharyngeal Phase -- -- impaired pharyngeal phase of swallowing  -CJ      Aspiration During the Swallow -- -- thin liquids;nectar-thick liquids;secondary to delayed swallow initiation or mistiming;secondary to reduced laryngeal elevation;secondary to reduced vestibular closure  -      Aspiration After the Swallow -- -- nectar-thick liquids;thin liquids;secondary to residue;in  laryngeal vestibule;in pyriform sinuses  -CJ      Depth of Penetration -- -- deep  -CJ      Response to Penetration -- -- No  -CJ      No spontaneous response to penetration and -- -- non-effective laryngeal clearance with cue (see comments)  -CJ      Response to Aspiration -- -- No  -CJ      No spontaneous response to aspiration with -- -- non-effective subglottic clearance with cue (see comments)  -CJ      Rosenbek's Scale -- -- nectar:;thin:;8-->Level 8  -CJ      Residue -- -- thin liquids;nectar-thick liquids;diffuse within pharynx;secondary to reduced posterior pharyngeal wall stripping;secondary to reduced laryngeal elevation;secondary to reduced hyolaryngeal excursion  -CJ      Response to Residue -- -- partial residue clearance;with cued swallow  -CJ      Attempted Compensatory Maneuvers -- -- bolus size;bolus presentation style;additional subsequent swallow;throat clear after swallow  -CJ      Response to Attempted Compensatory Maneuvers -- -- did not prevent aspiration  -CJ      Successful Compensatory Maneuver Competency -- -- patient able to;demonstrate compensations  -CJ      Pharyngeal Phase, Comment -- -- Improved oropharyngeal dysphagia. Silent aspiraiton w/ thins/nectar thick liquids only. Pushed for fatigue and no penetration/aspiration w/ soft solids or honey thick liquids. Will initaite po diet  -         SLP Evaluation Clinical Impression    SLP Swallowing Diagnosis -- mild;oral dysphagia;moderate;pharyngeal dysphagia  - mild;oral dysphagia;moderate;pharyngeal dysphagia  -      Functional Impact -- risk of aspiration/pneumonia  - risk of aspiration/pneumonia  -      Rehab Potential/Prognosis, Swallowing -- good, to achieve stated therapy goals  -CH good, to achieve stated therapy goals  -      Swallow Criteria for Skilled Therapeutic Interventions Met -- demonstrates skilled criteria  - demonstrates skilled criteria  -         SLP Treatment Clinical Impressions    Treatment  Assessment (SLP) continued;suspected;pharyngeal dysphagia  -MM continued;suspected;pharyngeal dysphagia  -CH --      Treatment Assessment Comments (SLP) Pt and RN reported toleration of current diet without difficulty or overt s/sx aspiration. Pt accepted ice chips and thin liquids via spoon with wet vocal quality noted x2/15 trials; cleared to baseline after cued cough. Pt accepted honey thick liquids via straw and meds whole (provided per RN) with honey thick liquids with no overt s/sx aspiration. SLP recs continue with current diet and repeat FEES for possible diet advancement on 1/31. All in agreement.  -MM Handout of exercises provided, reviewed and completed. Pt able to complete all with min cues. Tolerated trials of soft solids and honey thick liquids w/o s/s of aspiration.  -CH --      Daily Summary of Progress (SLP) progress toward functional goals is good  - progress toward functional goals as expected  -CH --      Plan for Continued Treatment (SLP) continue treatment per plan of care  -MM continue treatment per plan of care  -CH --      Care Plan Review care plan/treatment goals reviewed;risks/benefits reviewed;current/potential barriers reviewed;patient/other agree to care plan  - evaluation/treatment results reviewed;care plan/treatment goals reviewed;risks/benefits reviewed  -CH --      Care Plan Review, Other Participant(s) family  -MM -- --         Recommendations    Therapy Frequency (Swallow) 5 days per week  -MM 5 days per week  - 5 days per week  -      Predicted Duration Therapy Intervention (Days) 1 week  -MM 1 week  - 1 week  -      SLP Diet Recommendation soft to chew textures;chopped;no mixed consistencies;honey thick liquids  -MM soft to chew textures;chopped;no mixed consistencies;honey thick liquids  - soft to chew textures;chopped;no mixed consistencies;honey thick liquids  -      Recommended Diagnostics reassess via FEES;other (see comments)  1/31  -MM -- --       Recommended Precautions and Strategies general aspiration precautions;upright posture during/after eating;small bites of food and sips of liquid;check mouth frequently for oral residue/pocketing;assist with feeding  - general aspiration precautions;upright posture during/after eating;small bites of food and sips of liquid;check mouth frequently for oral residue/pocketing;assist with feeding  - general aspiration precautions;upright posture during/after eating;small bites of food and sips of liquid;check mouth frequently for oral residue/pocketing;assist with feeding  -      Oral Care Recommendations Oral Care BID/PRN;Toothbrush  - Oral Care BID/PRN;Toothbrush  - Oral Care BID/PRN;Toothbrush  -      SLP Rec. for Method of Medication Administration meds whole;meds crushed;with puree  - meds via alternate route  - meds via alternate route  -      Monitor for Signs of Aspiration yes;notify SLP if any concerns  - yes;notify SLP if any concerns  - yes;notify SLP if any concerns  -      Anticipated Discharge Disposition (SLP) inpatient rehabilitation facility  - inpatient rehabilitation facility  - inpatient rehabilitation facility  -                User Key  (r) = Recorded By, (t) = Taken By, (c) = Cosigned By      Initials Name Effective Dates     Chelo Pabon, MS CCC-SLP 01/21/25 -      Rocio Bran, MS CCC-SLP 01/20/25 -     MM Debbi Vargas, MS CCC-SLP 08/30/24 -                     EDUCATION  The patient has been educated in the following areas:   Dysphagia (Swallowing Impairment).        SLP GOALS       Row Name 01/30/25 0816 01/28/25 1400 01/27/25 1300       (LTG) Patient will demonstrate progress toward functional swallow for    Diet Texture (Demonstrate progress toward functional swallow) regular textures  -MM regular textures  - regular textures  -    Liquid viscosity (Demonstrate progress toward functional swallow) thin liquids  -MM thin liquids  - thin liquids   -CJ    [place] (Demonstrate progress towards functional swallow) with minimal cues (75-90% accuracy)  -MM with minimal cues (75-90% accuracy)  -CH with minimal cues (75-90% accuracy)  -CJ    Time Frame (Demonstrate progress toward functional swallow) 1 week  -MM 1 week  -CH 1 week  -CJ    Progress/Outcomes (Demonstrate progress toward functional swallow) goal ongoing  -MM goal ongoing  -CH goal ongoing  -CJ       (STG) Patient will tolerate trials of    Consistencies Trialed (Tolerate trials) soft to chew (chopped) textures;honey/ moderately thick liquids  -MM soft to chew (chopped) textures;honey/ moderately thick liquids  -CH soft to chew (chopped) textures;honey/ moderately thick liquids  -CJ    Desired Outcome (Tolerate trials) without signs/symptoms of aspiration;without signs of distress  -MM without signs/symptoms of aspiration;without signs of distress  -CH without signs/symptoms of aspiration;without signs of distress  -CJ    [place] (Tolerate trials) independently (over 90% accuracy)  -MM independently (over 90% accuracy)  -CH independently (over 90% accuracy)  -CJ    Time Frame (Tolerate trials) 1 week  -MM 1 week  -CH 1 week  -CJ    Progress/Outcomes (Tolerate trials) continuing progress toward goal  -MM continuing progress toward goal  -CH goal revised this date  -CJ    Comment (Tolerate trials) No s/sx aspiration with current diet  -MM No s/s of aspiration  -CH --       (STG) Patient will tolerate therapeutic trials of    Consistencies Trialed (Tolerate therapeutic trials) thin liquids  -MM thin liquids  -CH --    Desired Outcome (Tolerate therapeutic trials) without signs/symptoms of aspiration  -MM without signs/symptoms of aspiration  -CH --    [place] (Tolerate therapeutic trials) with minimal cues (75-90% accuracy)  -MM with minimal cues (75-90% accuracy)  -CH --    Time Frame (Tolerate therapeutic trials) 1 week  -MM 1 week  -CH --    Progress/Outcomes (Tolerate therapeutic trials)  continuing progress toward goal  -MM goal ongoing  -CH --    Comment (Tolerate therapeutic trials) Pt with wet vocal quality following 2/15 trials.  -MM hard cough w/ trials of thins  -CH --       (STG) Lingual Strengthening Goal 1 (SLP)    Activity (Lingual Strengthening Goal 1, SLP) -- increase lingual tone/sensation/control/coordination/movement;increase tongue back strength  -CH --    Increase Lingual Tone/Sensation/Control/Coordination/Movement -- lingual resistance exercises;swallow trials  -CH --    Increase Tongue Back Strength -- lingual resistance exercises  -CH --    [place]/Accuracy (Lingual Strengthening Goal 1, SLP) -- with minimal cues (75-90% accuracy)  -CH --    Time Frame (Lingual Strengthening Goal 1, SLP) -- 1 week  -CH --    Progress/Outcomes (Lingual Strengthening Goal 1, SLP) -- goal no longer appropriate  -CH goal no longer appropriate  -CJ       ([facility]) Pharyngeal Strengthening Exercise Goal 1 (SLP)    Activity (Pharyngeal Strengthening Goal 1, SLP) increase timing;increase superior movement of the hyolaryngeal complex;increase anterior movement of the hyolaryngeal complex;increase closure at entrance to airway/closure of airway at glottis;increase squeeze/positive pressure generation  -MM increase timing;increase superior movement of the hyolaryngeal complex;increase anterior movement of the hyolaryngeal complex;increase closure at entrance to airway/closure of airway at glottis;increase squeeze/positive pressure generation  -CH increase timing;increase superior movement of the hyolaryngeal complex;increase anterior movement of the hyolaryngeal complex;increase closure at entrance to airway/closure of airway at glottis;increase squeeze/positive pressure generation  -CJ    Increase Timing prepping - 3 second prep or suck swallow or 3-step swallow  -MM prepping - 3 second prep or suck swallow or 3-step swallow  -CH prepping - 3 second prep or suck swallow or 3-step swallow  -CJ    Increase  Superior Movement of the Hyolaryngeal Complex effortful pitch glide (falsetto + pharyngeal squeeze)  -MM effortful pitch glide (falsetto + pharyngeal squeeze)  -CH effortful pitch glide (falsetto + pharyngeal squeeze)  -CJ    Increase Anterior Movement of the Hyolaryngeal Complex chin tuck against resistance (CTAR)  -MM chin tuck against resistance (CTAR)  -CH chin tuck against resistance (CTAR)  -CJ    Increase Closure at Entrance to Airway/Closure of Airway at Glottis supraglottic swallow  -MM supraglottic swallow  -CH supraglottic swallow  -CJ    Increase Squeeze/Positive Pressure Generation hard effortful swallow  -MM hard effortful swallow  -CH hard effortful swallow  -CJ    [place]/Accuracy (Pharyngeal Strengthening Goal 1, SLP) with minimal cues (75-90% accuracy)  -MM with minimal cues (75-90% accuracy)  -CH with minimal cues (75-90% accuracy)  -CJ    Time Frame (Pharyngeal Strengthening Goal 1, SLP) 1 week  -MM 1 week  -CH 1 week  -CJ    Progress/Outcomes (Pharyngeal Strengthening Goal 1, SLP) continuing progress toward goal  -MM continuing progress toward goal  -CH goal ongoing  -CJ    Comment (Pharyngeal Strengthening Goal 1, SLP) Completed with cues  -MM reviewed and completed all with good effort  -CH --              User Key  (r) = Recorded By, (t) = Taken By, (c) = Cosigned By      Initials Name Provider Type     Chelo Pabon, MS CCC-SLP Speech and Language Pathologist    Rocio Rai, MS CCC-SLP Speech and Language Pathologist    Debbi Navas, MS CCC-SLP Speech and Language Pathologist                         Time Calculation:    Time Calculation- SLP       Row Name 01/30/25 1009             Time Calculation- SLP    SLP Start Time 0816  -MM      SLP Received On 01/30/25  -MM         Untimed Charges    41085-AE Treatment Swallow Minutes 41  -MM         Total Minutes    Untimed Charges Total Minutes 41  -MM       Total Minutes 41  -MM                User Key  (r) = Recorded By,  (t) = Taken By, (c) = Cosigned By      Initials Name Provider Type    Debbi Navas, MS CCC-SLP Speech and Language Pathologist                    Therapy Charges for Today       Code Description Service Date Service Provider Modifiers Qty    [id number] HC ST TREATMENT SWALLOW 3 1/30/2025 Debbi Vargas MS CCC-SLP GN 1                 Debbi Vargas MS CCC-SLP  1/30/2025

## 2025-01-30 NOTE — PLAN OF CARE
Goal Outcome Evaluation:  Plan of Care Reviewed With: patient        Progress: improving  Outcome Evaluation: PT re-cert completed. Pt able to participate w therapy today and had improvement with STS trials. She remains below functional mobility baseline for generalized weakness, activity tolerance, and balance deficits. she is MinAx1 for standing and side steps. Goals updated. Continue to recommend IP PT and IRF upon d/c.    Anticipated Discharge Disposition (PT): inpatient rehabilitation facility

## 2025-01-30 NOTE — PLAN OF CARE
Goal Outcome Evaluation:      Pt alert and oriented. RA, NSR. VSS. Magnesium replaced per protocol for mag of 1.1. Bed alarm active and audible, bed in lowest position, call light within reach, and no other requests at this time. Care on going.

## 2025-01-30 NOTE — THERAPY PROGRESS REPORT/RE-CERT
Patient Name: Natalia Arzate  : 1985    MRN: [id number]                              Today's Date: 2025       Admit Date: 2025    Visit Dx:     ICD-10-CM ICD-9-CM   1. Respiratory acidosis with metabolic acidosis  E87.4 276.3   2. Acute respiratory failure with hypoxia  J96.01 518.81   3. MARIAA (acute kidney injury)  N17.9 584.9   4. Pharyngeal dysphagia  R13.13 787.23     Patient Active Problem List   Diagnosis    Nephrolithiasis    Ovarian teratoma, right    Other chronic pain    Pelvic pain    History of DVT in adulthood    History of pulmonary embolism    Ureteral calculus    Ischemic cerebrovascular accident (CVA)    Primary hypertension    Left lumbar radiculopathy    PTSD (post-traumatic stress disorder)    MARIAA (acute kidney injury)    Anemia    Dyslipidemia    Hypothyroid    Other secondary gout, multiple sites    Factor 5 Leiden mutation, heterozygous    Vitamin D deficiency    Vitamin B 12 deficiency    Type 2 diabetes mellitus with hyperglycemia    Hoarseness, persistent    Ureterolithiasis    Acute cystitis without hematuria    Hepatic steatosis    Right flank pain, chronic    Detrusor instability of bladder    Frequency of micturition    Acute respiratory failure with hypercapnia    Hyperkalemia    Sepsis    Respiratory acidosis with metabolic acidosis    Acute respiratory failure    Diabetes mellitus type 2, insulin dependent    Diabetic peripheral neuropathy associated with type 2 diabetes mellitus     Past Medical History:   Diagnosis Date    A-fib     Abnormal ECG     Anemia     Anxiety     Asthma     Cancer     Ovarian    Depression     Diabetes mellitus     DVT (deep venous thrombosis)     Factor 5 Leiden mutation, heterozygous     Fibroid     GERD (gastroesophageal reflux disease)     Gout     H/O abdominal abscess     History of sepsis     History of transfusion     Hyperlipidemia     Hypothyroid     Kidney stone     Migraines     Neuropathy     Ovarian cancer 2021     Ovarian cyst     PE (pulmonary embolism)     Polycystic ovary syndrome     Preeclampsia     Rh incompatibility     Stroke     TIA (transient ischemic attack)     Urinary tract infection     Varicella      Past Surgical History:   Procedure Laterality Date    ABDOMINAL SURGERY      CARDIAC CATHETERIZATION       SECTION      CHOLECYSTECTOMY      COLONOSCOPY      ENDOSCOPY      EXTRACORPOREAL SHOCK WAVE LITHOTRIPSY (ESWL) Left 10/22/2021    Procedure: EXTRACORPOREAL SHOCKWAVE LITHOTRIPSY;  Surgeon: Milan Motley MD;  Location: [facility];  Service: Urology;  Laterality: Left;    HYSTERECTOMY  2017    ovarian ca    INSERT CENTRAL LINE AT BEDSIDE  2025    LITHOTRIPSY      NEPHRECTOMY Left 10/2022    RIGHT OOPHORECTOMY      URETEROSCOPY LASER LITHOTRIPSY WITH STENT INSERTION Left 10/01/2021    Procedure: URETEROSCOPY WITH STENT PLACEMENT;  Surgeon: Milan Motley MD;  Location: [facility] OR;  Service: Urology;  Laterality: Left;    URETEROSCOPY LASER LITHOTRIPSY WITH STENT INSERTION Left 2022    Procedure: URETEROSCOPY STENT REMOVAL;  Surgeon: Milan Motley MD;  Location: [facility] OR;  Service: Urology;  Laterality: Left;    URETEROSCOPY LASER LITHOTRIPSY WITH STENT INSERTION Left 2022    Procedure: CYSTOSCOPY RETROGRADE LEFT WITH STENT PLACEMENT;  Surgeon: Milan Motley MD;  Location: [facility] OR;  Service: Urology;  Laterality: Left;      General Information       Row Name 25 1035          Physical Therapy Time and Intention    Document Type progress note/recertification  -ER     Mode of Treatment physical therapy  -ER       Row Name 25 1032          General Information    Patient Profile Reviewed yes  -ER     Existing Precautions/Restrictions fall;oxygen therapy device and L/min;other (see comments)  Contact; back wound; hx of CVA with L sided deficits  -ER       Row Name 25 1031          Safety Issues/Impairments Affecting Functional Mobility     Impairments Affecting Function (Mobility) balance;coordination;endurance/activity tolerance;grasp;motor control;motor planning;pain;postural/trunk control;range of motion (ROM);sensation/sensory awareness;shortness of breath;strength;other (see comments)  -ER               User Key  (r) = Recorded By, (t) = Taken By, (c) = Cosigned By      Initials Name Provider Type    ER Yolande Nassar, PT Physical Therapist                   Mobility       Row Name 01/30/25 1036          Bed Mobility    Bed Mobility rolling left;supine-sit;sit-supine;scooting/bridging  -ER     Rolling Left [place] (Bed Mobility) minimum assist (75% patient effort)  -ER     Scooting/Bridging [place] (Bed Mobility) 2 person assist;maximum assist (25% patient effort)  -ER     Supine-Sit [place] (Bed Mobility) verbal cues;1 person assist;contact guard  -ER     Sit-Supine [place] (Bed Mobility) minimum assist (75% patient effort);1 person assist  -ER     Assistive Device (Bed Mobility) bed rails;repositioning sheet;head of bed elevated  -ER     Comment, (Bed Mobility) required significantly less assistance today to sit EOB  -ER       Row Name 01/30/25 1036          Bed-Chair Transfer    Comment, (Bed-Chair Transfer) deferred due to pt declining  -ER       Row Name 01/30/25 1036          Sit-Stand Transfer    Sit-Stand [place] (Transfers) minimum assist (75% patient effort)  -ER     Assistive Device (Sit-Stand Transfers) walker, front-wheeled  -ER     Comment, (Sit-Stand Transfer) pt stood 2x w minAx1. pt had more eccentric control in sitting today. able to stand for about 30s each repetition.  -ER       Row Name 01/30/25 1036          Gait/Stairs (Locomotion)    [place]ner Level (Gait) minimum assist (75% patient effort);2 person assist  -ER     Assistive Device (Gait) walker, front-wheeled  -ER     Patient was able to Ambulate yes  -ER     Distance in Feet (Gait) 2  -ER     Comment, (Gait/Stairs) pt only able to take  lateral steps at EOB to the L. fear of falling remains present. no dizziness noted  -ER               User Key  (r) = Recorded By, (t) = Taken By, (c) = Cosigned By      Initials Name Provider Type    ER Yolande Nassar, PT Physical Therapist                   Obj/Interventions       Row Name 01/30/25 1050          Shoulder (Therapeutic Exercise)    Shoulder (Therapeutic Exercise) --  PROM of flex, ext, abd, IR/ER wrist movement for RUE. biceps and triceps massage for activation  -ER       Row Name 01/30/25 1050          Balance    Balance Interventions sitting;standing;sit to stand;supported;dynamic;static;minimal challenge  -ER               User Key  (r) = Recorded By, (t) = Taken By, (c) = Cosigned By      Initials Name Provider Type    Yolande Sandra PT Physical Therapist                   Goals/Plan       Row Name 01/30/25 1054          Bed Mobility Goal 1 (PT)    Activity/Assistive Device (Bed Mobility Goal 1, PT) sit to supine;supine to sit;rolling to left;rolling to right  -ER     [place] Level/Cues Needed (Bed Mobility Goal 1, PT) contact guard required  -ER     Time Frame (Bed Mobility Goal 1, PT) short term goal (STG);5 days  -ER     Progress/Outcomes (Bed Mobility Goal 1, PT) good progress toward goal  -ER       Row Name 01/30/25 1054          Transfer Goal 1 (PT)    Activity/Assistive Device (Transfer Goal 1, PT) sit-to-stand/stand-to-sit;bed-to-chair/chair-to-bed;walker, rolling  -ER     [place] Level/Cues Needed (Transfer Goal 1, PT) minimum assist (75% or more patient effort)  -ER     Time Frame (Transfer Goal 1, PT) long term goal (LTG);10 days  -ER     Progress/Outcome (Transfer Goal 1, PT) goal partially met  -ER       Row Name 01/30/25 1054          Gait Training Goal 1 (PT)    Activity/Assistive Device (Gait Training Goal 1, PT) gait (walking locomotion);decrease fall risk;improve balance and speed;increase endurance/gait distance;walker, rolling  -ER     [place] Level (Gait  Training Goal 1, PT) minimum assist (75% or more patient effort)  -ER     Distance (Gait Training Goal 1, PT) 15  -ER     Time Frame (Gait Training Goal 1, PT) long term goal (LTG);10 days  -ER     Progress/Outcome (Gait Training Goal 1, PT) progress slower than expected  -ER       Row Name 01/30/25 1054          Therapy Assessment/Plan (PT)    Planned Therapy Interventions (PT) balance training;bed mobility training;gait training;home exercise program;neuromuscular re-education;strengthening;transfer training;stretching;postural re-education;patient/family education;ROM (range of motion);motor coordination training;manual therapy techniques  -ER               User Key  (r) = Recorded By, (t) = Taken By, (c) = Cosigned By      Initials Name Provider Type    ER Yolande Nassar, PT Physical Therapist                   Clinical Impression       Row Name 01/30/25 1051          Pain    Pretreatment Pain Rating 0/10 - no pain  -ER     Posttreatment Pain Rating 0/10 - no pain  -ER       Row Name 01/30/25 1051          Plan of Care Review    Plan of Care Reviewed With patient  -ER     Progress improving  -ER     Outcome Evaluation PT re-cert completed. Pt able to participate w therapy today and had improvement with STS trials. She remains below functional mobility baseline for generalized weakness, activity tolerance, and balance deficits. she is MinAx1 for standing and side steps. Goals updated. Continue to recommend IP PT and IRF upon d/c.  -ER       Row Name 01/30/25 1051          Vital Signs    Pre Systolic BP Rehab 142  -ER     Pre Treatment Diastolic BP 91  -ER     Posttreatment Heart Rate (beats/min) 95  -ER     O2 Delivery Pre Treatment room air  -ER     O2 Delivery Intra Treatment room air  -ER     O2 Delivery Post Treatment room air  -ER     Pre Patient Position Supine  -ER     Intra Patient Position Standing  -ER     Post Patient Position Supine  -ER       Row Name 01/30/25 1051          Positioning and Restraints     Pre-Treatment Position in bed  -ER     Post Treatment Position bed  -ER     In Bed notified nsg;supine;call light within reach;encouraged to call for assist;with family/caregiver;exit alarm on;side rails up x3  -ER               User Key  (r) = Recorded By, (t) = Taken By, (c) = Cosigned By      Initials Name Provider Type    ER Yolande Nassar, PT Physical Therapist                   Outcome Measures       Row Name 01/30/25 1059 01/30/25 0811       How much help from another person do you currently need...    Turning from your back to your side while in flat bed without using bedrails? 2  -ER 2  -AH    Moving from lying on back to sitting on the side of a flat bed without bedrails? 2  -ER 2  -AH    Moving to and from a bed to a chair (including a wheelchair)? 2  -ER 1  -AH    Standing up from a chair using your arms (e.g., wheelchair, bedside chair)? 3  -ER 1  -AH    Climbing 3-5 steps with a railing? 1  -ER 1  -AH    To walk in hospital room? 1  -ER 1  -AH    AM-PAC 6 Clicks Score (PT) 11  -ER 8  -AH    Highest Level of Mobility Goal 4 --> Transfer to chair/commode  -ER 3 --> Sit at edge of bed  -AH      Row Name 01/30/25 1059          Functional Assessment    Outcome Measure Options AM-PAC 6 Clicks Basic Mobility (PT)  -ER               User Key  (r) = Recorded By, (t) = Taken By, (c) = Cosigned By      Initials Name Provider Type    ER Yolande Nassar, PT Physical Therapist    Viola Espinoza RN Registered Nurse                                 Physical Therapy Education       Title: PT OT SLP Therapies (In Progress)       Topic: Physical Therapy (In Progress)       Point: Mobility training (In Progress)       Learning Progress Summary            Patient Acceptance, E, VU by ER at 1/30/2025 1059    Comment: progress, goals, POC    Acceptance, E, VU by ER at 1/27/2025 1127    Comment: progress, POC, need for PT    Acceptance, E, VU,NR by ML at 1/24/2025 1151    Acceptance, E, VU,NR by NS at 1/22/2025 161     Comment: pt encouraged to continue with HEP between therapy sessions    Acceptance, E, NR by ML at 1/19/2025 1124   Family Acceptance, E, NR by ML at 1/19/2025 1124                      Point: Home exercise program (Done)       Learning Progress Summary            Patient Acceptance, E, VU by ER at 1/30/2025 1059    Comment: progress, goals, POC    Acceptance, E, VU by ER at 1/27/2025 1127    Comment: progress, POC, need for PT    Acceptance, E, VU,NR by ML at 1/24/2025 1151    Acceptance, E, VU,NR by NS at 1/22/2025 1613    Comment: pt encouraged to continue with HEP between therapy sessions                      Point: Body mechanics (Done)       Learning Progress Summary            Patient Acceptance, E, VU by ER at 1/30/2025 1059    Comment: progress, goals, POC    Acceptance, E, VU by ER at 1/27/2025 1127    Comment: progress, POC, need for PT    Acceptance, E, VU,NR by ML at 1/24/2025 1151    Acceptance, E, VU,NR by NS at 1/22/2025 1613    Comment: pt encouraged to continue with HEP between therapy sessions                      Point: Precautions (In Progress)       Learning Progress Summary            Patient Acceptance, E, VU by ER at 1/30/2025 1059    Comment: progress, goals, POC    Acceptance, E, VU by ER at 1/27/2025 1127    Comment: progress, POC, need for PT    Acceptance, E, VU,NR by NS at 1/22/2025 1613    Comment: pt encouraged to continue with HEP between therapy sessions    Acceptance, E, NR by ML at 1/19/2025 1124   Family Acceptance, E, NR by ML at 1/19/2025 1124                                      User Key       Initials Effective Dates Name Provider Type Discipline    NS 06/16/21 -  Germaine Thao, PT Physical Therapist PT    ML 04/22/21 -  Debbi Hall Physical Therapist PT    ER 12/13/24 -  Yolande Nassar, PT Physical Therapist PT                  PT Recommendation and Plan  Planned Therapy Interventions (PT): balance training, bed mobility training, gait training, home exercise program,  neuromuscular re-education, strengthening, transfer training, stretching, postural re-education, patient/family education, ROM (range of motion), motor coordination training, manual therapy techniques  Progress: improving  Outcome Evaluation: PT re-cert completed. Pt able to participate w therapy today and had improvement with STS trials. She remains below functional mobility baseline for generalized weakness, activity tolerance, and balance deficits. she is MinAx1 for standing and side steps. Goals updated. Continue to recommend IP PT and IRF upon d/c.     Time Calculation:         PT Charges       Row Name 01/30/25 1100             Time Calculation    Start Time 0959  -ER      PT Received On 01/30/25  -ER      PT Goal Re-Cert Due Date 02/09/25  -ER         Timed Charges    31251 - PT Therapeutic Activity Minutes 36  -ER         Total Minutes    Timed Charges Total Minutes 36  -ER       Total Minutes 36  -ER                User Key  (r) = Recorded By, (t) = Taken By, (c) = Cosigned By      Initials Name Provider Type    ER Yolande Nassar, PT Physical Therapist                  Therapy Charges for Today       Code Description Service Date Service Provider Modifiers Qty    [id number]  PT THERAPEUTIC ACT EA 15 MIN 1/30/2025 Yolande Nassar PT GP 2            PT G-Codes  Outcome Measure Options: AM-PAC 6 Clicks Basic Mobility (PT)  AM-PAC 6 Clicks Score (PT): 11  AM-PAC 6 Clicks Score (OT): 14  PT Discharge Summary  Anticipated Discharge Disposition (PT): inpatient rehabilitation facility    Yolande Nassar PT  1/30/2025

## 2025-01-31 ENCOUNTER — ANCILLARY PROCEDURE (OUTPATIENT)
Dept: SPEECH THERAPY | Facility: HOSPITAL | Age: 40
DRG: 870 | End: 2025-01-31
Payer: COMMERCIAL

## 2025-01-31 ENCOUNTER — APPOINTMENT (OUTPATIENT)
Dept: CT IMAGING | Facility: HOSPITAL | Age: 40
DRG: 870 | End: 2025-01-31
Payer: COMMERCIAL

## 2025-01-31 LAB
GLUCOSE BLDC GLUCOMTR-MCNC: 194 MG/DL (ref 70–130)
GLUCOSE BLDC GLUCOMTR-MCNC: 202 MG/DL (ref 70–130)
GLUCOSE BLDC GLUCOMTR-MCNC: 225 MG/DL (ref 70–130)
GLUCOSE BLDC GLUCOMTR-MCNC: 232 MG/DL (ref 70–130)

## 2025-01-31 PROCEDURE — 25010000002 HYDROMORPHONE 1 MG/ML SOLUTION: Performed by: INTERNAL MEDICINE

## 2025-01-31 PROCEDURE — 99232 SBSQ HOSP IP/OBS MODERATE 35: CPT | Performed by: INTERNAL MEDICINE

## 2025-01-31 PROCEDURE — 63710000001 INSULIN LISPRO (HUMAN) PER 5 UNITS

## 2025-01-31 PROCEDURE — 63710000001 INSULIN GLARGINE PER 5 UNITS: Performed by: INTERNAL MEDICINE

## 2025-01-31 PROCEDURE — 25510000001 IOPAMIDOL 61 % SOLUTION: Performed by: INTERNAL MEDICINE

## 2025-01-31 PROCEDURE — 63710000001 INSULIN LISPRO (HUMAN) PER 5 UNITS: Performed by: INTERNAL MEDICINE

## 2025-01-31 PROCEDURE — 92612 ENDOSCOPY SWALLOW (FEES) VID: CPT

## 2025-01-31 PROCEDURE — 74177 CT ABD & PELVIS W/CONTRAST: CPT

## 2025-01-31 PROCEDURE — 82948 REAGENT STRIP/BLOOD GLUCOSE: CPT

## 2025-01-31 PROCEDURE — 25010000002 HYDROMORPHONE PER 4 MG: Performed by: INTERNAL MEDICINE

## 2025-01-31 RX ORDER — IOPAMIDOL 612 MG/ML
90 INJECTION, SOLUTION INTRAVASCULAR
Status: COMPLETED | OUTPATIENT
Start: 2025-01-31 | End: 2025-01-31

## 2025-01-31 RX ADMIN — METOCLOPRAMIDE 10 MG: 10 TABLET ORAL at 08:24

## 2025-01-31 RX ADMIN — INSULIN LISPRO 4 UNITS: 100 INJECTION, SOLUTION INTRAVENOUS; SUBCUTANEOUS at 08:24

## 2025-01-31 RX ADMIN — GABAPENTIN 300 MG: 300 CAPSULE ORAL at 04:43

## 2025-01-31 RX ADMIN — HYDROMORPHONE HYDROCHLORIDE 0.5 MG: 1 INJECTION, SOLUTION INTRAMUSCULAR; INTRAVENOUS; SUBCUTANEOUS at 09:12

## 2025-01-31 RX ADMIN — HYDROMORPHONE HYDROCHLORIDE 0.5 MG: 1 INJECTION, SOLUTION INTRAMUSCULAR; INTRAVENOUS; SUBCUTANEOUS at 17:38

## 2025-01-31 RX ADMIN — Medication 5 MG: at 23:59

## 2025-01-31 RX ADMIN — APIXABAN 5 MG: 5 TABLET, FILM COATED ORAL at 08:24

## 2025-01-31 RX ADMIN — DULOXETINE HYDROCHLORIDE 60 MG: 60 CAPSULE, DELAYED RELEASE ORAL at 08:24

## 2025-01-31 RX ADMIN — GABAPENTIN 300 MG: 300 CAPSULE ORAL at 13:40

## 2025-01-31 RX ADMIN — METOPROLOL SUCCINATE 50 MG: 50 TABLET, EXTENDED RELEASE ORAL at 20:45

## 2025-01-31 RX ADMIN — OXYCODONE HYDROCHLORIDE 5 MG: 5 SOLUTION ORAL at 03:00

## 2025-01-31 RX ADMIN — HYDROMORPHONE HYDROCHLORIDE 0.5 MG: 1 INJECTION, SOLUTION INTRAMUSCULAR; INTRAVENOUS; SUBCUTANEOUS at 04:44

## 2025-01-31 RX ADMIN — HYDROCODONE BITARTRATE AND ACETAMINOPHEN 1 TABLET: 5; 325 TABLET ORAL at 17:03

## 2025-01-31 RX ADMIN — COLLAGENASE SANTYL 1 APPLICATION: 250 OINTMENT TOPICAL at 08:25

## 2025-01-31 RX ADMIN — INSULIN LISPRO 8 UNITS: 100 INJECTION, SOLUTION INTRAVENOUS; SUBCUTANEOUS at 18:39

## 2025-01-31 RX ADMIN — INSULIN LISPRO 20 UNITS: 100 INJECTION, SOLUTION INTRAVENOUS; SUBCUTANEOUS at 12:06

## 2025-01-31 RX ADMIN — HYDROMORPHONE HYDROCHLORIDE 1 MG: 1 INJECTION, SOLUTION INTRAMUSCULAR; INTRAVENOUS; SUBCUTANEOUS at 23:59

## 2025-01-31 RX ADMIN — INSULIN LISPRO 8 UNITS: 100 INJECTION, SOLUTION INTRAVENOUS; SUBCUTANEOUS at 12:07

## 2025-01-31 RX ADMIN — Medication 1 CAPSULE: at 08:24

## 2025-01-31 RX ADMIN — HYDROMORPHONE HYDROCHLORIDE 1 MG: 1 INJECTION, SOLUTION INTRAMUSCULAR; INTRAVENOUS; SUBCUTANEOUS at 20:45

## 2025-01-31 RX ADMIN — GABAPENTIN 300 MG: 300 CAPSULE ORAL at 20:45

## 2025-01-31 RX ADMIN — INSULIN LISPRO 8 UNITS: 100 INJECTION, SOLUTION INTRAVENOUS; SUBCUTANEOUS at 20:46

## 2025-01-31 RX ADMIN — APIXABAN 5 MG: 5 TABLET, FILM COATED ORAL at 20:46

## 2025-01-31 RX ADMIN — HYDROCODONE BITARTRATE AND ACETAMINOPHEN 1 TABLET: 5; 325 TABLET ORAL at 08:23

## 2025-01-31 RX ADMIN — Medication 10 ML: at 20:46

## 2025-01-31 RX ADMIN — INSULIN LISPRO 20 UNITS: 100 INJECTION, SOLUTION INTRAVENOUS; SUBCUTANEOUS at 08:24

## 2025-01-31 RX ADMIN — HYDROMORPHONE HYDROCHLORIDE 0.5 MG: 1 INJECTION, SOLUTION INTRAMUSCULAR; INTRAVENOUS; SUBCUTANEOUS at 00:35

## 2025-01-31 RX ADMIN — NYSTATIN: 100000 POWDER TOPICAL at 08:25

## 2025-01-31 RX ADMIN — METOCLOPRAMIDE 10 MG: 10 TABLET ORAL at 18:39

## 2025-01-31 RX ADMIN — AMITRIPTYLINE HYDROCHLORIDE 25 MG: 25 TABLET, FILM COATED ORAL at 20:46

## 2025-01-31 RX ADMIN — INSULIN GLARGINE 50 UNITS: 100 INJECTION, SOLUTION SUBCUTANEOUS at 20:46

## 2025-01-31 RX ADMIN — NYSTATIN: 100000 POWDER TOPICAL at 20:47

## 2025-01-31 RX ADMIN — HYDROXYZINE HYDROCHLORIDE 25 MG: 25 TABLET ORAL at 20:45

## 2025-01-31 RX ADMIN — AVOBENZONE, HOMOSALATE, OCTISALATE, OCTOCRYLENE, AND OXYBENZONE 1 PACKET: 29.4; 147; 49; 25.4; 58.8 LOTION TOPICAL at 08:24

## 2025-01-31 RX ADMIN — METOCLOPRAMIDE 10 MG: 10 TABLET ORAL at 12:06

## 2025-01-31 RX ADMIN — INSULIN LISPRO 20 UNITS: 100 INJECTION, SOLUTION INTRAVENOUS; SUBCUTANEOUS at 18:39

## 2025-01-31 RX ADMIN — OXYCODONE HYDROCHLORIDE 5 MG: 5 SOLUTION ORAL at 12:06

## 2025-01-31 RX ADMIN — HYDROMORPHONE HYDROCHLORIDE 0.5 MG: 1 INJECTION, SOLUTION INTRAMUSCULAR; INTRAVENOUS; SUBCUTANEOUS at 13:40

## 2025-01-31 RX ADMIN — METOPROLOL SUCCINATE 50 MG: 50 TABLET, EXTENDED RELEASE ORAL at 08:24

## 2025-01-31 RX ADMIN — IOPAMIDOL 90 ML: 612 INJECTION, SOLUTION INTRAVENOUS at 18:11

## 2025-01-31 RX ADMIN — DULOXETINE HYDROCHLORIDE 30 MG: 30 CAPSULE, DELAYED RELEASE ORAL at 20:46

## 2025-01-31 RX ADMIN — INSULIN GLARGINE 50 UNITS: 100 INJECTION, SOLUTION SUBCUTANEOUS at 08:24

## 2025-01-31 RX ADMIN — AMLODIPINE BESYLATE 10 MG: 10 TABLET ORAL at 08:24

## 2025-01-31 NOTE — PROGRESS NOTES
Patient has been initiated on Apixaban (Eliquis) during admission. Education provided on 1/31/2025 verbally and in writing. Information provided includes effects of medication, drug-drug and drug-food interactions, and signs/symptoms of bleeding and clotting. Patient/family verbalized understanding through teach back. All pertinent questions were answered by pharmacist.    Danial Campos, [facility]  1/31/2025

## 2025-01-31 NOTE — PLAN OF CARE
Goal Outcome Evaluation:  Plan of Care Reviewed With: patient, significant other        Progress: improving       Anticipated Discharge Disposition (SLP): inpatient rehabilitation facility          SLP Swallowing Diagnosis: mild, oral dysphagia, functional pharyngeal phase (01/31/25 0830)         FEES completed; see note for more information

## 2025-01-31 NOTE — MBS/VFSS/FEES
Acute Care - Speech Language Pathology   Swallow Re-Evaluation [facility]  Fiberoptic Endoscopic Evaluation of Swallowing (FEES)       Patient Name: Natalia Arzate  : 1985  MRN: [id number]  Today's Date: 2025               Admit Date: 2025    Visit Dx:     ICD-10-CM ICD-9-CM   1. Respiratory acidosis with metabolic acidosis  E87.4 276.3   2. Acute respiratory failure with hypoxia  J96.01 518.81   3. MARIAA (acute kidney injury)  N17.9 584.9   4. Pharyngeal dysphagia  R13.13 787.23     Patient Active Problem List   Diagnosis    Nephrolithiasis    Ovarian teratoma, right    Other chronic pain    Pelvic pain    History of DVT in adulthood    History of pulmonary embolism    Ureteral calculus    Ischemic cerebrovascular accident (CVA)    Primary hypertension    Left lumbar radiculopathy    PTSD (post-traumatic stress disorder)    MARIAA (acute kidney injury)    Anemia    Dyslipidemia    Hypothyroid    Other secondary gout, multiple sites    Factor 5 Leiden mutation, heterozygous    Vitamin D deficiency    Vitamin B 12 deficiency    Type 2 diabetes mellitus with hyperglycemia    Hoarseness, persistent    Ureterolithiasis    Acute cystitis without hematuria    Hepatic steatosis    Right flank pain, chronic    Detrusor instability of bladder    Frequency of micturition    Acute respiratory failure with hypercapnia    Hyperkalemia    Sepsis    Respiratory acidosis with metabolic acidosis    Acute respiratory failure    Diabetes mellitus type 2, insulin dependent    Diabetic peripheral neuropathy associated with type 2 diabetes mellitus     Past Medical History:   Diagnosis Date    A-fib     Abnormal ECG     Anemia     Anxiety     Asthma     Cancer     Ovarian    Depression     Diabetes mellitus     DVT (deep venous thrombosis)     Factor 5 Leiden mutation, heterozygous     Fibroid     GERD (gastroesophageal reflux disease)     Gout     H/O abdominal abscess     History of sepsis     History of transfusion      Hyperlipidemia     Hypothyroid     Kidney stone     Migraines     Neuropathy     Ovarian cancer 2021    Ovarian cyst     PE (pulmonary embolism)     Polycystic ovary syndrome     Preeclampsia     Rh incompatibility     Stroke     TIA (transient ischemic attack)     Urinary tract infection     Varicella      Past Surgical History:   Procedure Laterality Date    ABDOMINAL SURGERY      CARDIAC CATHETERIZATION       SECTION      CHOLECYSTECTOMY      COLONOSCOPY      ENDOSCOPY      EXTRACORPOREAL SHOCK WAVE LITHOTRIPSY (ESWL) Left 10/22/2021    Procedure: EXTRACORPOREAL SHOCKWAVE LITHOTRIPSY;  Surgeon: Milan Motley MD;  Location: [facility] OR;  Service: Urology;  Laterality: Left;    HYSTERECTOMY  2017    ovarian ca    INSERT CENTRAL LINE AT BEDSIDE  2025    LITHOTRIPSY      NEPHRECTOMY Left 10/2022    RIGHT OOPHORECTOMY      URETEROSCOPY LASER LITHOTRIPSY WITH STENT INSERTION Left 10/01/2021    Procedure: URETEROSCOPY WITH STENT PLACEMENT;  Surgeon: Milan Motley MD;  Location: [facility] OR;  Service: Urology;  Laterality: Left;    URETEROSCOPY LASER LITHOTRIPSY WITH STENT INSERTION Left 2022    Procedure: URETEROSCOPY STENT REMOVAL;  Surgeon: Milan Motley MD;  Location: [facility] OR;  Service: Urology;  Laterality: Left;    URETEROSCOPY LASER LITHOTRIPSY WITH STENT INSERTION Left 2022    Procedure: CYSTOSCOPY RETROGRADE LEFT WITH STENT PLACEMENT;  Surgeon: Milan Motley MD;  Location: [facility] OR;  Service: Urology;  Laterality: Left;       SLP Recommendation and Plan  SLP Swallowing Diagnosis: mild, oral dysphagia, functional pharyngeal phase (25 0830)  SLP Diet Recommendation: regular textures, thin liquids (25 0830)  Recommended Precautions and Strategies: upright posture during/after eating, general aspiration precautions (25 0830)  SLP Rec. for Method of Medication Administration: meds whole, with thin liquids, with puree (25  0830)     Monitor for Signs of Aspiration: yes, notify SLP if any concerns (01/31/25 0830)     Swallow Criteria for Skilled Therapeutic Interventions Met: demonstrates skilled criteria (01/31/25 0830)  Anticipated Discharge Disposition (SLP): inpatient rehabilitation facility (01/31/25 0830)  Rehab Potential/Prognosis, Swallowing: good, to achieve stated therapy goals (01/31/25 0830)  Therapy Frequency (Swallow): 5 days per week (01/31/25 0830)  Predicted Duration Therapy Intervention (Days): 1 week (01/31/25 0830)  Oral Care Recommendations: Oral Care BID/PRN, Toothbrush (01/31/25 0830)                                        Progress: improving      SWALLOW EVALUATION (Last 72 Hours)       SLP Adult Swallow Evaluation       Row Name 01/31/25 0830 01/30/25 0816 01/28/25 1400             Rehab Evaluation    Document Type re-evaluation;other (see comments)  FEES  -MM therapy note (daily note)  -MM therapy note (daily note)  -CH      Subjective Information no complaints  -MM no complaints  -MM no complaints  -CH      Patient Observations alert;cooperative  -MM alert;cooperative  -MM alert;cooperative;agree to therapy  -      Patient/Family/Caregiver Comments/Observations S.O. present  -MM S.O. present  -MM S.O. present  -CH      Patient Effort good  -MM good  -MM good  -CH      Symptoms Noted During/After Treatment -- none  -MM none  -CH      Oral Care -- -- oral rinse provided  -         General Information    Patient Profile Reviewed yes  -MM yes  -MM yes  -CH      Pertinent History Of Current Problem See initial eval  -MM See initial eval  -MM --      Current Method of Nutrition soft to chew textures;chopped;honey-thick liquids  -MM -- --      Precautions/Limitations, Vision WFL;for purposes of eval  -MM -- --      Precautions/Limitations, Hearing WFL;for purposes of eval  -MM -- --      Prior Level of Function-Communication unknown  -MM -- --      Plans/Goals Discussed with patient;agreed upon  - -- --       Barriers to Rehab none identified  -MM -- --      Patient's Goals for Discharge return home  -MM -- --      Family Goals for Discharge family did not state  -MM -- --         Pain    Pretreatment Pain Rating 0/10 - no pain  -MM 0/10 - no pain  -MM --      Posttreatment Pain Rating 0/10 - no pain  -MM 0/10 - no pain  -MM --         Pain Scale: FACES Pre/Post-Treatment    Pain: FACES Scale, Pretreatment -- -- 0-->no hurt  -CH      Posttreatment Pain Rating -- -- 0-->no hurt  -CH         Fiberoptic Endoscopic Evaluation of Swallowing (FEES)    Risks/Benefits Reviewed risks/benefits explained;patient;family;agreed to eval  -MM -- --      Nasal Entry right:  -MM -- --      Scope serial number/identification [id number]  -MM -- --         Anatomy and Physiology    Anatomic Considerations no anatomic structural deviation  -MM -- --      Velopharyngeal WFL  -MM -- --      Base of Tongue symmetrical;range adequate  -MM -- --      Epiglottis WFL  -MM -- --      Laryngeal Function Breathing symmetrical  -MM -- --      Laryngeal Function Phonation symmetrical  -MM -- --      Laryngeal Function to Breath Hold incomplete closure  -MM -- --      Secretion Rating Scale (Apollo et al. 1996) 0- normal, no visible secretions  -MM -- --      Secretion Description thin;clear  -MM -- --      Ice Chips elicited swallow  -MM -- --      Spontaneous Swallow frequency adequate  -MM -- --      Sensory sensed scope  -MM -- --      Utensils Used Spoon;Cup;Straw  -MM -- --      Consistencies Trialed thin liquids;pudding/puree;regular textures  -MM -- --         FEES Interpretation    Oral Phase prespill of liquids into pharynx  -MM -- --      Oral Phase, Comment Pt with premature spillage of thin liquids via straw to pyriform sinuses.  -MM -- --         Initiation of Pharyngeal Swallow    Initiation of Pharyngeal Swallow bolus in pyriform sinuses  -MM -- --      Pharyngeal Phase functional pharyngeal phase of swallow  -MM -- --      Pharyngeal Phase,  Comment Pt presents with grossly functional pharyngeal deglutition with no aspiration or penetration observed on this date. SLP recs diet advancement to regular and thin liquids and will f/u x1 to ensure diet toleration.  -MM -- --         SLP Evaluation Clinical Impression    SLP Swallowing Diagnosis mild;oral dysphagia;functional pharyngeal phase  -MM -- mild;oral dysphagia;moderate;pharyngeal dysphagia  -      Functional Impact no impact on function  -MM -- risk of aspiration/pneumonia  -      Rehab Potential/Prognosis, Swallowing good, to achieve stated therapy goals  -MM -- good, to achieve stated therapy goals  -      Swallow Criteria for Skilled Therapeutic Interventions Met demonstrates skilled criteria  -MM -- demonstrates skilled criteria  -         SLP Treatment Clinical Impressions    Treatment Assessment (SLP) -- continued;suspected;pharyngeal dysphagia  -MM continued;suspected;pharyngeal dysphagia  -      Treatment Assessment Comments (SLP) -- Pt and RN reported toleration of current diet without difficulty or overt s/sx aspiration. Pt accepted ice chips and thin liquids via spoon with wet vocal quality noted x2/15 trials; cleared to baseline after cued cough. Pt accepted honey thick liquids via straw and meds whole (provided per RN) with honey thick liquids with no overt s/sx aspiration. SLP recs continue with current diet and repeat FEES for possible diet advancement on 1/31. All in agreement.  - Handout of exercises provided, reviewed and completed. Pt able to complete all with min cues. Tolerated trials of soft solids and honey thick liquids w/o s/s of aspiration.  -      Daily Summary of Progress (SLP) -- progress toward functional goals is good  - progress toward functional goals as expected  [facility]      Plan for Continued Treatment (SLP) -- continue treatment per plan of care  -MM continue treatment per plan of care  -      Care Plan Review -- care plan/treatment goals  reviewed;risks/benefits reviewed;current/potential barriers reviewed;patient/other agree to care plan  - evaluation/treatment results reviewed;care plan/treatment goals reviewed;risks/benefits reviewed  -      Care Plan Review, Other Participant(s) -- family  - --         Recommendations    Therapy Frequency (Swallow) 5 days per week  -MM 5 days per week  -MM 5 days per week  -      Predicted Duration Therapy Intervention (Days) 1 week  -MM 1 week  -MM 1 week  -      SLP Diet Recommendation regular textures;thin liquids  -MM soft to chew textures;chopped;no mixed consistencies;honey thick liquids  - soft to chew textures;chopped;no mixed consistencies;honey thick liquids  -      Recommended Diagnostics -- reassess via FEES;other (see comments)  1/31  -MM --      Recommended Precautions and Strategies upright posture during/after eating;general aspiration precautions  - general aspiration precautions;upright posture during/after eating;small bites of food and sips of liquid;check mouth frequently for oral residue/pocketing;assist with feeding  - general aspiration precautions;upright posture during/after eating;small bites of food and sips of liquid;check mouth frequently for oral residue/pocketing;assist with feeding  -      Oral Care Recommendations Oral Care BID/PRN;Toothbrush  - Oral Care BID/PRN;Toothbrush  - Oral Care BID/PRN;Toothbrush  -      SLP Rec. for Method of Medication Administration meds whole;with thin liquids;with puree  -MM meds whole;meds crushed;with puree  -MM meds via alternate route  -      Monitor for Signs of Aspiration yes;notify SLP if any concerns  - yes;notify SLP if any concerns  - yes;notify SLP if any concerns  -      Anticipated Discharge Disposition (SLP) inpatient rehabilitation facility  - inpatient rehabilitation facility  - inpatient rehabilitation facility  -                User Key  (r) = Recorded By, (t) = Taken By, (c) = Cosigned By       Initials Name Effective Dates    CH Rocio Bran, MS CCC-SLP 01/20/25 -     MM Debbi Vargas, MS CCC-SLP 08/30/24 -                     EDUCATION  The patient has been educated in the following areas:   Results and recommendations .        SLP GOALS       Row Name 01/31/25 0830 01/30/25 0816 01/28/25 1400       (LTG) Patient will demonstrate progress toward functional swallow for    Diet Texture (Demonstrate progress toward functional swallow) regular textures  -MM regular textures  -MM regular textures  -CH    Liquid viscosity (Demonstrate progress toward functional swallow) thin liquids  -MM thin liquids  -MM thin liquids  -CH    [place] (Demonstrate progress towards functional swallow) with minimal cues (75-90% accuracy)  -MM with minimal cues (75-90% accuracy)  -MM with minimal cues (75-90% accuracy)  -CH    Time Frame (Demonstrate progress toward functional swallow) 1 week  -MM 1 week  -MM 1 week  -CH    Progress/Outcomes (Demonstrate progress toward functional swallow) good progress toward goal  -MM goal ongoing  -MM goal ongoing  -CH    Comment (Demonstrate progress toward functional swallow) FEES completed on this date with recommendations for regular and thin liquids  -MM -- --       (STG) Patient will tolerate trials of    Consistencies Trialed (Tolerate trials) regular textures;thin liquids  -MM soft to chew (chopped) textures;honey/ moderately thick liquids  -MM soft to chew (chopped) textures;honey/ moderately thick liquids  -CH    Desired Outcome (Tolerate trials) without signs/symptoms of aspiration;without signs of distress  -MM without signs/symptoms of aspiration;without signs of distress  -MM without signs/symptoms of aspiration;without signs of distress  -CH    [place] (Tolerate trials) independently (over 90% accuracy)  -MM independently (over 90% accuracy)  -MM independently (over 90% accuracy)  -CH    Time Frame (Tolerate trials) 1 week  -MM 1 week  -MM 1 week  -CH     Progress/Outcomes (Tolerate trials) goal revised this date  -MM continuing progress toward goal  -MM continuing progress toward goal  -CH    Comment (Tolerate trials) Tolerated current diet, goal advanced on this date  -MM No s/sx aspiration with current diet  -MM No s/s of aspiration  -CH       (STG) Patient will tolerate therapeutic trials of    Consistencies Trialed (Tolerate therapeutic trials) thin liquids  -MM thin liquids  -MM thin liquids  -CH    Desired Outcome (Tolerate therapeutic trials) without signs/symptoms of aspiration  -MM without signs/symptoms of aspiration  -MM without signs/symptoms of aspiration  -CH    [place] (Tolerate therapeutic trials) with minimal cues (75-90% accuracy)  -MM with minimal cues (75-90% accuracy)  -MM with minimal cues (75-90% accuracy)  -CH    Time Frame (Tolerate therapeutic trials) 1 week  -MM 1 week  -MM 1 week  -CH    Progress/Outcomes (Tolerate therapeutic trials) goal no longer appropriate  -MM continuing progress toward goal  -MM goal ongoing  -CH    Comment (Tolerate therapeutic trials) -- Pt with wet vocal quality following 2/15 trials.  -MM hard cough w/ trials of thins  -CH       (STG) Lingual Strengthening Goal 1 (SLP)    Activity (Lingual Strengthening Goal 1, SLP) -- -- increase lingual tone/sensation/control/coordination/movement;increase tongue back strength  -CH    Increase Lingual Tone/Sensation/Control/Coordination/Movement -- -- lingual resistance exercises;swallow trials  -CH    Increase Tongue Back Strength -- -- lingual resistance exercises  -CH    [place]/Accuracy (Lingual Strengthening Goal 1, SLP) -- -- with minimal cues (75-90% accuracy)  -CH    Time Frame (Lingual Strengthening Goal 1, SLP) -- -- 1 week  -CH    Progress/Outcomes (Lingual Strengthening Goal 1, SLP) -- -- goal no longer appropriate  -CH       (STG) Pharyngeal Strengthening Exercise Goal 1 (SLP)    Activity (Pharyngeal Strengthening Goal 1, SLP) increase timing;increase  superior movement of the hyolaryngeal complex;increase anterior movement of the hyolaryngeal complex;increase closure at entrance to airway/closure of airway at glottis;increase squeeze/positive pressure generation  -MM increase timing;increase superior movement of the hyolaryngeal complex;increase anterior movement of the hyolaryngeal complex;increase closure at entrance to airway/closure of airway at glottis;increase squeeze/positive pressure generation  -MM increase timing;increase superior movement of the hyolaryngeal complex;increase anterior movement of the hyolaryngeal complex;increase closure at entrance to airway/closure of airway at glottis;increase squeeze/positive pressure generation  -CH    Increase Timing prepping - 3 second prep or suck swallow or 3-step swallow  -MM prepping - 3 second prep or suck swallow or 3-step swallow  -MM prepping - 3 second prep or suck swallow or 3-step swallow  -CH    Increase Superior Movement of the Hyolaryngeal Complex effortful pitch glide (falsetto + pharyngeal squeeze)  -MM effortful pitch glide (falsetto + pharyngeal squeeze)  -MM effortful pitch glide (falsetto + pharyngeal squeeze)  -CH    Increase Anterior Movement of the Hyolaryngeal Complex chin tuck against resistance (CTAR)  -MM chin tuck against resistance (CTAR)  -MM chin tuck against resistance (CTAR)  -CH    Increase Closure at Entrance to Airway/Closure of Airway at Glottis supraglottic swallow  -MM supraglottic swallow  -MM supraglottic swallow  -CH    Increase Squeeze/Positive Pressure Generation hard effortful swallow  -MM hard effortful swallow  -MM hard effortful swallow  -CH    [place]/Accuracy (Pharyngeal Strengthening Goal 1, SLP) with minimal cues (75-90% accuracy)  -MM with minimal cues (75-90% accuracy)  -MM with minimal cues (75-90% accuracy)  -CH    Time Frame (Pharyngeal Strengthening Goal 1, SLP) 1 week  -MM 1 week  -MM 1 week  -CH    Progress/Outcomes (Pharyngeal Strengthening Goal 1,  SLP) goal no longer appropriate  -MM continuing progress toward goal  -MM continuing progress toward goal  -CH    Comment (Pharyngeal Strengthening Goal 1, SLP) -- Completed with cues  -MM reviewed and completed all with good effort  -CH              User Key  (r) = Recorded By, (t) = Taken By, (c) = Cosigned By      Initials Name Provider Type     Rocio Bran, MS CCC-SLP Speech and Language Pathologist    Debbi Navas MS CCC-SLP Speech and Language Pathologist                         Time Calculation:    Time Calculation- SLP       Row Name 01/31/25 0951             Time Calculation- SLP    SLP Start Time 0830  -MM      SLP Received On 01/31/25  -MM         Untimed Charges    [id number]-[id number] Fiberoptic Endo Eval Swallow Minutes 70  -MM         Total Minutes    Untimed Charges Total Minutes 70  -MM       Total Minutes 70  -MM                User Key  (r) = Recorded By, (t) = Taken By, (c) = Cosigned By      Initials Name Provider Type    Debbi Navas, MS CCC-SLP Speech and Language Pathologist                    Therapy Charges for Today       Code Description Service Date Service Provider Modifiers Qty    88979214085 HC ST TREATMENT SWALLOW 3 1/30/2025 Debbi Vargas MS CCC-SLP GN 1    [id number] HC ST FIBEROPTIC ENDO EVAL SWALL 5 1/31/2025 Debbi Vargas MS CCC-SLP GN 1                 Debbi Vargas MS CCC-SLP  1/31/2025

## 2025-01-31 NOTE — PROGRESS NOTES
[facility] Medicine Services  PROGRESS NOTE    Patient Name: Natalia Arzate  : 1985  MRN: [id number]    Date of Admission: 2025  Primary Care Physician: Bladimir Calle PA-C    Subjective   Subjective     CC:  Resp failure    HPI:  Patient seen and examined this morning.  Continues to feel well.  No acute complaints or events overnight.  Awaiting rehab    Objective   Objective     Vital Signs:   Temp:  [97 °F (36.1 °C)-98.4 °F (36.9 °C)] 97.6°F (36.9 °C)  Heart Rate:  [] 91  Resp:  [18] 18  BP: (127-132)/() 127/81     Physical Exam:  General: morbidly obese , not in distress, conversant and cooperative  Head: Atraumatic and normocephalic  Eyes: No Icterus. No pallor  Ears:  Ears appear intact with no abnormalities noted  Throat: No oral lesions, no thrush  Neck: Supple, trachea midline  Lungs: Clear to auscultation bilaterally, equal air entry, no wheezing or crackles  Heart:  Normal S1 and S2, no murmur, no gallop, No JVD, 2+ lower extremity swelling  Abdomen:  Soft, no tenderness, no organomegaly, normal bowel sounds, no organomegaly  Extremities: pulses equal bilaterally  Skin: No bleeding, bruising or rash, normal skin turgor and elasticity  Neurologic: Cranial nerves appear intact with no evidence of facial asymmetry, normal motor and sensory functions in all 4 extremities  Psych: Alert and oriented x 3, normal mood     Results Reviewed:  LAB RESULTS:      Lab 25  0342 25  1603 25  0554 25  0702 25  0552 25  0224 25  1603   WBC 3.94  --  5.58  --   --  6.05  --    HEMOGLOBIN 8.4*  --  9.0*  --   --  9.7*  --    HEMATOCRIT 26.0*  --  27.6*  --   --  30.1*  --    PLATELETS 162  --  167  --   --  188  --    NEUTROS ABS 1.80  --  3.18  --   --  3.57  --    IMMATURE GRANS (ABS) 0.01  --  0.02  --   --  0.01  --    LYMPHS ABS 1.27  --  1.47  --   --  1.48  --    MONOS ABS 0.56  --  0.52  --   --  0.57  --    EOS ABS  0.27  --  0.33  --   --  0.38  --    MCV 93.2  --  90.2  --   --  90.7  --    CRP  --   --   --   --   --  3.58* 2.18*   HEPARIN ANTI-XA  --  0.90  --   --   --   --   --    HSTROP T  --   --   --  36* 34*  --   --          Lab 01/30/25  0342 01/27/25  0554 01/26/25 0224 01/25/25  0627 01/24/25  1603   SODIUM 130* 131* 126*  --  132*   POTASSIUM 4.5 4.7 4.1  --  4.5   CHLORIDE 93* 91* 86*  --  90*   CO2 21.0* 24.0 27.0  --  28.0   ANION GAP 16.0* 16.0* 13.0  --  14.0   BUN 45* 36* 28*  --  22*   CREATININE 0.95 0.63 0.58  --  0.52*   EGFR 78.8 116.6 119.0  --  122.1   GLUCOSE 306* 235* 241*  --  231*   CALCIUM 10.1 10.8* 10.8*  --  10.4   MAGNESIUM 1.1* 1.6 1.2* 1.8 1.2*   PHOSPHORUS  --   --  4.5  --  5.0*         Lab 01/30/25  0342 01/26/25  0224 01/24/25  1603   TOTAL PROTEIN 7.7 8.1 8.1   ALBUMIN 4.1 4.1 3.9   GLOBULIN 3.6 4.0 4.2   ALT (SGPT) 35* 38* 35*   AST (SGOT) 60* 64* 70*   BILIRUBIN 0.4 0.6 0.5   ALK PHOS 136* 140* 134*         Lab 01/26/25  0702 01/26/25  0552   HSTROP T 36* 34*         Lab 01/24/25  1603   CHOLESTEROL 264*   TRIGLYCERIDES 739*                 Brief Urine Lab Results  (Last result in the past 365 days)        Color   Clarity   Blood   Leuk Est   Nitrite   Protein   CREAT   Urine HCG        01/02/25 1213             52.3         01/02/25 1213 Yellow   Cloudy   Moderate (2+)   Negative   Negative   100 mg/dL (2+)                   Microbiology Results Abnormal       Procedure Component Value - Date/Time    Respiratory Culture - Wash, Lung, Left Lower Lobe [[id number]]  (Abnormal)  (Susceptibility) Collected: 01/14/25 1608    Lab Status: Final result Specimen: Wash from Lung, Left Lower Lobe Updated: 01/17/25 1221     Respiratory Culture Light growth (2+) Klebsiella pneumoniae ESBL     Comment:   Consider infectious disease consult.  Susceptibility results may not correlate to clinical outcomes.         Light growth (2+) Staphylococcus aureus, MRSA     Comment:   Considering site of  infection and appropriate dosing, oxacillin (or cefoxitin) results can be applied to cefazolin, nafcillin, ampicillin/sulbactam, dicloxacillin, amoxicillin/clavulanate, cephalexin, and cefpodoxime.  Methicillin resistant Staphylococcus aureus, Patient may be an isolation risk.         Rare growth Normal Respiratory Margo     Gram Stain Many (4+) WBCs per low power field      Rare (1+) Epithelial cells per low power field      Rare (1+) Gram positive cocci in pairs and clusters    Susceptibility        Klebsiella pneumoniae ESBL      KELSIE      Ciprofloxacin Resistant      Ertapenem Susceptible      Levofloxacin Resistant      Meropenem Susceptible      Tetracycline Resistant      Trimethoprim + Sulfamethoxazole Resistant                       Susceptibility        Staphylococcus aureus, MRSA      KELSIE      Clindamycin Resistant      Linezolid Susceptible      Oxacillin Resistant      Tetracycline Susceptible      Trimethoprim + Sulfamethoxazole Susceptible      Vancomycin Susceptible                       Susceptibility Comments       Klebsiella pneumoniae ESBL    With the exception of urinary-sourced infections, aminoglycosides should not be used as monotherapy.      Staphylococcus aureus, MRSA    This isolate is presumed to be clindamycin resistant based on detection of inducible clindamycin resistance.  Clindamycin may still be effective in some patients.  This isolate is presumed to be clindamycin resistant based on detection of inducible clindamycin resistance.  Clindamycin may still be effective in some patients.               Respiratory Culture - Sputum, ET Suction [[id number]]  (Abnormal) Collected: 01/14/25 4609    Lab Status: Final result Specimen: Sputum from ET Suction Updated: 01/17/25 1221     Respiratory Culture Light growth (2+) Klebsiella pneumoniae ESBL     Comment:   Consider infectious disease consult.  Susceptibility results may not correlate to clinical outcomes.         Light growth (2+)  Staphylococcus aureus, MRSA     Comment:   Considering site of infection and appropriate dosing, oxacillin (or cefoxitin) results can be applied to cefazolin, nafcillin, ampicillin/sulbactam, dicloxacillin, amoxicillin/clavulanate, cephalexin, and cefpodoxime.  Methicillin resistant Staphylococcus aureus, Patient may be an isolation risk.        Gram Stain Many (4+) WBCs per low power field      Rare (1+) Epithelial cells per low power field      Few (2+) Gram positive cocci in pairs, chains and clusters    Narrative:      Refer to LLL Wash culture for MICS.     Blood Culture - Blood, Blood, Arterial Line [[id number]]  (Abnormal) Collected: 01/14/25 1434    Lab Status: Final result Specimen: Blood, Arterial Line Updated: 01/17/25 0636     Blood Culture Staphylococcus, coagulase negative     Isolated from Anaerobic Bottle     Gram Stain Anaerobic Bottle Gram positive cocci in pairs and clusters    Narrative:      Probable contaminant requires clinical correlation, susceptibility not performed unless requested by physician.      Blood Culture ID, PCR - Blood, Blood, Arterial Line [[id number]]  (Abnormal) Collected: 01/14/25 1434    Lab Status: Final result Specimen: Blood, Arterial Line Updated: 01/16/25 0657     BCID, PCR Staph spp, not aureus or lugdunensis. Identification by BCID2 PCR.     BOTTLE TYPE Anaerobic Bottle    Blood Culture - Blood, Arm, Left [[id number]]  (Abnormal) Collected: 01/05/25 1305    Lab Status: Final result Specimen: Blood from Arm, Left Updated: 01/07/25 1100     Blood Culture Staphylococcus, coagulase negative     Isolated from Anaerobic Bottle     Gram Stain Anaerobic Bottle Gram positive cocci in clusters    Narrative:      Less than seven (7) mL's of blood was collected.  Insufficient quantity may yield false negative results.  Probable contaminant requires clinical correlation, susceptibility not performed unless requested by physician.    Blood Culture ID, PCR - Blood, Arm, Left  [[id number]]  (Abnormal) Collected: 01/05/25 1305    Lab Status: Final result Specimen: Blood from Arm, Left Updated: 01/06/25 1435     BCID, PCR Staph spp, not aureus or lugdunensis. Identification by BCID2 PCR.     BOTTLE TYPE Anaerobic Bottle    Respiratory Culture - Bronchial Wash, Lung, Left Lower Lobe [[id number]]  (Abnormal)  (Susceptibility) Collected: 01/04/25 0755    Lab Status: Final result Specimen: Bronchial Wash from Lung, Left Lower Lobe Updated: 01/06/25 0908     Respiratory Culture Scant growth (1+) Staphylococcus aureus, MRSA     Comment:   Methicillin resistant Staphylococcus aureus, Patient may be an isolation risk.         No Normal Respiratory Margo     Gram Stain Moderate (3+) WBCs seen      Rare (1+) Gram positive cocci in pairs and clusters    Susceptibility        Staphylococcus aureus, MRSA      KELSIE      Clindamycin Resistant      Linezolid Susceptible      Oxacillin Resistant      Tetracycline Susceptible      Trimethoprim + Sulfamethoxazole Susceptible      Vancomycin Susceptible                       Susceptibility Comments       Staphylococcus aureus, MRSA    This isolate is presumed to be clindamycin resistant based on detection of inducible clindamycin resistance.  Clindamycin may still be effective in some patients.  This isolate is presumed to be clindamycin resistant based on detection of inducible clindamycin resistance.  Clindamycin may still be effective in some patients.               Respiratory Culture - Sputum, ET Suction [[id number]]  (Abnormal)  (Susceptibility) Collected: 01/02/25 1212    Lab Status: Final result Specimen: Sputum from ET Suction Updated: 01/04/25 0939     Respiratory Culture Moderate growth (3+) Staphylococcus aureus, MRSA      No Normal Respiratory Margo     Gram Stain Many (4+) WBCs per low power field      Rare (1+) Epithelial cells per low power field      Many (4+) Gram positive cocci in pairs and clusters    Susceptibility        Staphylococcus  aureus, MRSA      KELSIE      Clindamycin Resistant      Linezolid Susceptible      Oxacillin Resistant      Tetracycline Susceptible      Trimethoprim + Sulfamethoxazole Susceptible      Vancomycin Susceptible                       Susceptibility Comments       Staphylococcus aureus, MRSA    This isolate is presumed to be clindamycin resistant based on detection of inducible clindamycin resistance.  Clindamycin may still be effective in some patients.               MRSA Screen, PCR (Inpatient) - Swab, Nares [[id number]]  (Abnormal) Collected: 01/02/25 1246    Lab Status: Final result Specimen: Swab from Nares Updated: 01/02/25 1504     MRSA PCR Positive    Narrative:      The negative predictive value of this diagnostic test is high and should only be used to consider de-escalating anti-MRSA therapy. A positive result may indicate colonization with MRSA and must be correlated clinically.            SLP FEES - Fiberoptic Endo Eval Swallow    Result Date: 1/31/2025  This procedure was auto-finalized with no dictation required.     Results for orders placed during the hospital encounter of 01/02/25    Adult Transthoracic Echo Complete W/ Cont if Necessary Per Protocol    Interpretation Summary    Left ventricular systolic function is normal. Calculated left ventricular EF = 59.3% Left ventricular ejection fraction appears to be 61 - 65%.    Left ventricular wall thickness is consistent with borderline concentric hypertrophy.    Left ventricular diastolic function was normal.    Mild aortic valve stenosis is present.    Peak velocity of the flow distal to the aortic valve is 292 cm/s. Aortic valve mean pressure gradient is 20 mmHg.    Estimated right ventricular systolic pressure from tricuspid regurgitation is normal (<35 mmHg).      Current medications:  Scheduled Meds:amitriptyline, 25 mg, Oral, Nightly  amLODIPine, 10 mg, Oral, Daily  apixaban, 5 mg, Oral, Q12H  collagenase, 1 Application, Topical, Q24H  DULoxetine, 30  mg, Oral, Nightly  DULoxetine, 60 mg, Oral, Daily  gabapentin, 300 mg, Oral, Q8H  hydrOXYzine, 25 mg, Oral, Nightly  insulin glargine, 50 Units, Subcutaneous, Q12H  Insulin Lispro, 20 Units, Subcutaneous, TID With Meals  insulin lispro, 4-24 Units, Subcutaneous, 4x Daily AC & at Bedtime  lactobacillus acidophilus, 1 capsule, Oral, Daily  metoclopramide, 10 mg, Oral, TID AC  metoprolol succinate XL, 50 mg, Oral, BID  Nutrisource fiber, 1 packet, Per G Tube, TID  nystatin, , Topical, Q12H  Psyllium, 1 packet, Oral, BID      Continuous Infusions:   PRN Meds:.  acetaminophen    dextrose    dextrose    glucagon (human recombinant)    HYDROcodone-acetaminophen    HYDROmorphone    ipratropium    ipratropium-albuterol    loperamide    Magnesium Standard Dose Replacement - Follow Nurse / BPA Driven Protocol    meclizine    melatonin    midazolam    ondansetron    oxyCODONE    Polyvinyl Alcohol-Povidone PF    Potassium Replacement - Follow Nurse / BPA Driven Protocol    sodium chloride    sodium chloride    Assessment & Plan   Assessment & Plan     Active Hospital Problems    Diagnosis  POA    **Acute respiratory failure with hypercapnia [J96.02]  Yes    Sepsis [A41.9]  Yes    Type 2 diabetes mellitus with hyperglycemia [E11.65]  Yes    Hypothyroid [E03.9]  Yes    Factor 5 Leiden mutation, heterozygous [D68.51]  Yes    MARIAA (acute kidney injury) [N17.9]  Yes    Primary hypertension [I10]  Yes    PTSD (post-traumatic stress disorder) [F43.10]  Yes    History of DVT in adulthood [Z86.718]  Not Applicable      Resolved Hospital Problems   No resolved problems to display.        Brief Hospital Course to date:  Natalia Arzate is a 39 y.o. female with a history of HLD, Hypothyroidism, Afib, PE/DVT, Factor V Leiden mutation, and stroke who presented to [facility] with altered mental status. Labs there were significant for renal failure, hyperglycemia, and hypercapnic respiratory failure. Dx with  pneumonia and resp failure.  She was  intubated and required the initiation of vasopressors. She was transferred to [facility] on 1/2/25 for ongoing care.    Acute hypoxic resp failure, improving  Severe sepsis with end organ failure- renal failure and resp failure, improved  MRSA PNA--s/p vanc/linezolid  ESBL Klebsiella PNA  S/p intubation, extubated from 1/1/2025 till 1/17/2025  Pt underwent bronch on 1/14 with significant mucus plugging, post CXR with improvement in L lung aeration.  S/P merrem, vancomycin and linezolid for ESBL pneumonia and MRSA pneumonia respectively  Currently on room air--cont pulmonary toilet, oxygen as needed.    Gemella sanguinis Bacteremia  1 bottle Gemella sanguinis--unclear significance  S/p 10 days amp/unasyn.    Hypoosmolar hyponatremia, improved  Secondary to ongoing diuretic therapy with Bumex and diarrhea   improved with holding diuretics and after her diarrhea improved after switching to regular diet from Keofeed  Sodium improved and back to baseline    MARIAA due to severe sepsis  S/P Lt nephrectomy in 2022  Pt required CRRT ~ 1/8/25-1/11/25   Renal function improving, no need for further HD  Monitor labs    Severe dysphagia  Keofeed discontinued   Currently tolerating modified diet with thin thick liquid after FEES    Right arm pain, improved  X-rays to forearm and humerus neg for fracture  Doppler ultrasound right upper extremity with no evidence of DVT  As needed analgesia    Diarrhea, improved  Most likely secondary to tube feeds.  Now resolved after patient started eating by mouth.  Prior C diff neg  Continue probiotic, as needed Imodium and cholestyramine    Factor V Leiden mutation  History of PE/DVT  History of stroke  Continue home lovenox    DM  A1c 9.1%  Continue insulin Lantus, Premeal insulin and SSI    Hypertension  History A-fib  Continue metoprolol, norvasc  Switch Lovenox to Eliquis    History of PTSD  Continue home cymbalta and elavil    Expected Discharge Location and Transportation: Acute rehab  Expected  Discharge   Expected Discharge Date: 2/3/2025; Expected Discharge Time:      VTE Prophylaxis:  Pharmacologic & mechanical VTE prophylaxis orders are present.         AM-PAC 6 Clicks Score (PT): 15 (01/31/25 0830)    CODE STATUS:   Code Status and Medical Interventions: CPR (Attempt to Resuscitate); Full Support   Ordered at: 01/02/25 1952     Code Status (Patient has no pulse and is not breathing):    CPR (Attempt to Resuscitate)     Medical Interventions (Patient has pulse or is breathing):    Full Support       Soo Casas MD  01/31/25

## 2025-01-31 NOTE — PAYOR COMM NOTE
"Mago Arzate \"Isa\" (39 y.o. Female)       Date of Birth   1985    Social Security Number       Address   [address] [facility] [address] [facility] [address] [facility] [address]    Home Phone   [phone]    MRN   [id number]       [facility]    Marital Status                               Admission Date   1/2/25    Admission Type   Urgent    Admitting Provider   Soo Casas MD    Attending Provider   Soo Casas MD    Department, Room/Bed   [facility] 6A, N620/1       Discharge Date       Discharge Disposition       Discharge Destination                                 Attending Provider: Soo Casas MD    Allergies: Salvia Officinalis, Shellfish Allergy, Toradol [Ketorolac Tromethamine], Tree Nut, Bleach [Sodium Hypochlorite], Haldol [Haloperidol], Tramadol, Amoxicillin, Penicillins    Isolation: Contact   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), 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)   Code Status: CPR    Ht: 162.6 cm (64\")   Wt: 145 kg (319 lb 3.6 oz)    Admission Cmt: None   Principal Problem: Acute respiratory failure with hypercapnia [J96.02]                   Active Insurance as of 1/2/2025       Primary Coverage       Payor Plan Insurance Group Employer/Plan Group    [place]        Payor Plan Address Payor Plan Phone Number Payor Plan Fax Number Effective Dates    [address]   4/1/2016 - None Entered    [facility] [phone]         Subscriber Name Subscriber Birth Date Member ID       MAGO ARZATE 1985 [id number]                     Emergency Contacts        (Rel.) Home Phone Work Phone Mobile Phone    Faina Arzate (Mother) [phone] -- --    HueyMilan (Son) [phone] -- [phone]              [place]: [facility] [id number] Tax ID [id number]  Insurance Information                  [employer] Phone: --    Subscriber: Mago Arzate Subscriber#: " [id number]    Group#: -- Precert#: --    Authorization#: [id number] Effective Date: --          Current Facility-Administered Medications   Medication Dose Route Frequency Provider Last Rate Last Admin    acetaminophen (TYLENOL) 160 MG/5ML oral solution 650 mg  650 mg Oral Q6H PRN Otilia Quinteros RPH        amitriptyline (ELAVIL) tablet 25 mg  25 mg Oral Nightly Otilia Quinteros RP   25 mg at 01/30/25 2037    amLODIPine (NORVASC) tablet 10 mg  10 mg Oral Daily Otilia Quinteros RP   10 mg at 01/30/25 0822    apixaban (ELIQUIS) tablet 5 mg  5 mg Oral Q12H Soo Casas MD   5 mg at 01/30/25 2037    collagenase ointment 1 Application  1 Application Topical Q24H Soo Casas MD   1 Application at 01/30/25 0821    dextrose (D50W) (25 g/50 mL) IV injection 25 g  25 g Intravenous Q15 Min PRN Otilia Quinteros RPH        dextrose (GLUTOSE) oral gel 15 g  15 g Oral Q15 Min PRN Otilia Quinteros RP        DULoxetine (CYMBALTA) DR capsule 30 mg  30 mg Oral Nightly Diana Ghotra MD   30 mg at 01/30/25 2037    DULoxetine (CYMBALTA) DR capsule 60 mg  60 mg Oral Daily Diana Ghotra MD   60 mg at 01/30/25 0821    gabapentin (NEURONTIN) capsule 300 mg  300 mg Oral Q8H Otilia Quinteros RP   300 mg at 01/31/25 0443    glucagon (GLUCAGEN) injection 1 mg  1 mg Intramuscular Q15 Min PRN Otilia Quinteros RP        HYDROcodone-acetaminophen (NORCO) 5-325 MG per tablet 1 tablet  1 tablet Oral Q4H PRN Otilia Quinteros RPH   1 tablet at 01/30/25 1548    HYDROmorphone (DILAUDID) injection 0.5 mg  0.5 mg Intravenous Q4H PRN Soo Casas MD   0.5 mg at 01/31/25 0444    hydrOXYzine (ATARAX) tablet 25 mg  25 mg Oral Nightly Lynne Neff APRN   25 mg at 01/30/25 2037    insulin glargine (LANTUS, SEMGLEE) injection 50 Units  50 Units Subcutaneous Q12H Soo Casas MD   50 Units at 01/30/25 2038    Insulin Lispro (humaLOG) injection 20 Units  20 Units  Subcutaneous TID With Meals Soo Casas MD   20 Units at 01/30/25 1825    Insulin Lispro (humaLOG) injection 4-24 Units  4-24 Units Subcutaneous 4x Daily AC & at Bedtime Otilia Quinteros RPH   8 Units at 01/30/25 2038    ipratropium (ATROVENT) nebulizer solution 0.5 mg  0.5 mg Nebulization Q4H PRN Case, Tiffanie V., DO        ipratropium-albuterol (DUO-NEB) nebulizer solution 3 mL  3 mL Nebulization Q4H PRN Case, Tiffanie V., DO   3 mL at 01/24/25 1440    lactobacillus acidophilus (RISAQUAD) capsule 1 capsule  1 capsule Oral Daily Otilia Quinteros RPH   1 capsule at 01/30/25 0822    loperamide (IMODIUM) capsule 2 mg  2 mg Oral Q4H PRN Soo Casas MD        Magnesium Standard Dose Replacement - Follow Nurse / BPA Driven Protocol   Not Applicable PRN Case, Tiffanie V., DO        meclizine (ANTIVERT) tablet 25 mg  25 mg Nasogastric TID PRN Diana Ghotra MD   25 mg at 01/22/25 1251    melatonin tablet 5 mg  5 mg Oral Nightly PRN Izzy Wells PA-C   5 mg at 01/30/25 2037    metoclopramide (REGLAN) tablet 10 mg  10 mg Oral TID AC oSo Casas MD   10 mg at 01/30/25 1825    metoprolol succinate XL (TOPROL-XL) 24 hr tablet 50 mg  50 mg Oral BID Diana Ghotra MD   50 mg at 01/30/25 2037    midazolam (VERSED) injection 2 mg  2 mg Intravenous Q1H PRN Case, Tiffanie V., DO   2 mg at 01/24/25 0434    Nutrisource fiber pack 1 packet  1 packet Per G Tube TID Ritika Mendez, LOUIE        nystatin (MYCOSTATIN) powder   Topical Q12H Case, Tiffanie V., DO   Given at 01/30/25 2039    ondansetron (ZOFRAN) injection 4 mg  4 mg Intravenous Q6H PRN Case, Tiffanie V., DO   4 mg at 01/20/25 1808    oxyCODONE (ROXICODONE) 5 MG/5ML solution 5 mg  5 mg Oral Q4H PRN Soo Casas MD   5 mg at 01/31/25 0300    Polyvinyl Alcohol-Povidone PF (ARTIFICIAL TEARS) 1.4-0.6 % ophthalmic solution 2 drop  2 drop Both Eyes Q2H PRN Case, Tiffanie V., DO        Potassium Replacement - Follow Nurse  / BPA Driven Protocol   Not Applicable PRN Case, Tiffanie V., DO        Psyllium (METAMUCIL MULTIHEALTH FIBER) 51.7 % packet 1 packet  1 packet Oral BID Soo Casas MD   1 packet at 01/29/25 2051    sodium chloride 0.9 % flush 10 mL  10 mL Intravenous PRN Case, Tiffanie V., DO   10 mL at 01/30/25 2038    sodium chloride 0.9 % flush 20 mL  20 mL Intravenous PRN Case, Tiffanie V., DO         Lab Results (last 24 hours)       Procedure Component Value Units Date/Time    POC Glucose Once [[id number]]  (Abnormal) Collected: 01/31/25 0730    Specimen: Blood Updated: 01/31/25 0732     Glucose 194 mg/dL     POC Glucose Once [[id number]]  (Abnormal) Collected: 01/30/25 1911    Specimen: Blood Updated: 01/30/25 1953     Glucose 220 mg/dL     POC Glucose Once [[id number]]  (Abnormal) Collected: 01/30/25 1727    Specimen: Blood Updated: 01/30/25 1729     Glucose 242 mg/dL     POC Glucose Once [[id number]]  (Abnormal) Collected: 01/30/25 1137    Specimen: Blood Updated: 01/30/25 1158     Glucose 170 mg/dL           Imaging Results (Last 24 Hours)       Procedure Component Value Units Date/Time    SLP FEES - Fiberoptic Endo Eval Swallow [[id number]] Resulted: 01/31/25 0814     Updated: 01/31/25 0814          Orders (last 24 hrs)        Start     Ordered    01/31/25 0733  POC Glucose Once  PROCEDURE ONCE        Comments: Complete no more than 45 minutes prior to patient eating      01/31/25 0730    01/31/25 0000  SLP FEES - Fiberoptic Endo Eval Swallow  Once         01/30/25 1001    01/30/25 1954  POC Glucose Once  PROCEDURE ONCE        Comments: Complete no more than 45 minutes prior to patient eating      01/30/25 1911    01/30/25 1730  metoclopramide (REGLAN) tablet 10 mg  3 Times Daily Before Meals         01/30/25 1340    01/30/25 1730  POC Glucose Once  PROCEDURE ONCE        Comments: Complete no more than 45 minutes prior to patient eating      01/30/25 1727    01/30/25 1339  oxyCODONE (ROXICODONE) 5 MG/5ML solution 5 mg   Every 4 Hours PRN         01/30/25 1340    01/30/25 1159  POC Glucose Once  PROCEDURE ONCE        Comments: Complete no more than 45 minutes prior to patient eating      01/30/25 1137    01/30/25 1024  Transfer Patient  Once,   Status:  Canceled         01/30/25 1024    01/30/25 0700  magnesium sulfate 2g/50 mL (PREMIX) infusion  Every 2 Hours         01/30/25 0549    01/29/25 2100  insulin glargine (LANTUS, SEMGLEE) injection 50 Units  Every 12 Hours Scheduled         01/29/25 1018    01/29/25 2100  apixaban (ELIQUIS) tablet 5 mg  Every 12 Hours Scheduled         01/29/25 1419    01/29/25 1800  Dietary Nutrition Supplements Magic Cup; vanilla  Daily With Dinner       01/29/25 1602    01/29/25 1200  Insulin Lispro (humaLOG) injection 20 Units  3 Times Daily With Meals         01/29/25 1018    01/29/25 0830  collagenase ointment 1 Application  Every 24 Hours Scheduled         01/29/25 0741    01/28/25 1800  DIET MESSAGE Please send honey thick sugar free lemonade with all meals.  Daily With Breakfast, Lunch & Dinner      Comments: Please send honey thick sugar free lemonade with all meals.    01/28/25 1621    01/28/25 0900  amLODIPine (NORVASC) tablet 10 mg  Daily         01/27/25 1839    01/28/25 0900  lactobacillus acidophilus (RISAQUAD) capsule 1 capsule  Daily         01/27/25 1839    01/27/25 2200  gabapentin (NEURONTIN) capsule 300 mg  Every 8 Hours Scheduled         01/27/25 1839    01/27/25 2200  POC Glucose 4x Daily Before Meals & at Bedtime  4 Times Daily Before Meals & at Bedtime      Comments: Complete no more than 45 minutes prior to patient eating      01/27/25 1839    01/27/25 2100  hydrOXYzine (ATARAX) tablet 25 mg  Nightly         01/27/25 1512    01/27/25 2100  amitriptyline (ELAVIL) tablet 25 mg  Nightly         01/27/25 1839    01/27/25 2100  Insulin Lispro (humaLOG) injection 4-24 Units  4 Times Daily Before Meals & Nightly         01/27/25 1839    01/27/25 1826  dextrose (GLUTOSE) oral gel 15 g   Every 15 Minutes PRN         01/27/25 1839    01/27/25 1826  dextrose (D50W) (25 g/50 mL) IV injection 25 g  Every 15 Minutes PRN         01/27/25 1839    01/27/25 1826  glucagon (GLUCAGEN) injection 1 mg  Every 15 Minutes PRN         01/27/25 1839    01/27/25 1826  HYDROcodone-acetaminophen (NORCO) 5-325 MG per tablet 1 tablet  Every 4 Hours PRN         01/27/25 1839    01/27/25 1826  acetaminophen (TYLENOL) 160 MG/5ML oral solution 650 mg  Every 6 Hours PRN         01/27/25 1839    01/27/25 1600  Nutrisource fiber pack 1 packet  3 Times Daily         01/27/25 1302    01/27/25 1040  loperamide (IMODIUM) capsule 2 mg  Every 4 Hours PRN         01/27/25 1041    01/26/25 2128  melatonin tablet 5 mg  Nightly PRN         01/26/25 2128    01/26/25 2100  Psyllium (METAMUCIL MULTIHEALTH FIBER) 51.7 % packet 1 packet  2 Times Daily         01/26/25 1458    01/25/25 2200  cholestyramine light packet 4 g  Every 8 Hours Scheduled,   Status:  Discontinued         01/25/25 1605    01/25/25 1217  HYDROmorphone (DILAUDID) injection 0.5 mg  Every 4 Hours PRN         01/25/25 1217    01/22/25 1037  meclizine (ANTIVERT) tablet 25 mg  3 Times Daily PRN         01/22/25 1037    01/21/25 2100  DULoxetine (CYMBALTA) DR capsule 30 mg  Nightly         01/21/25 0946    01/21/25 1615  metoprolol succinate XL (TOPROL-XL) 24 hr tablet 50 mg  2 Times Daily         01/21/25 1519    01/21/25 1100  metoclopramide (REGLAN) injection 10 mg  Every 6 Hours,   Status:  Discontinued         01/21/25 0947    01/21/25 1045  DULoxetine (CYMBALTA) DR capsule 60 mg  Daily         01/21/25 0946    01/21/25 1002  Wound Care  2 Times Daily      Comments: Turn q 2 hours.  Apply allevyn dressings to sacrum and heels.    01/21/25 1001    01/21/25 0944  oxyCODONE (ROXICODONE) 5 MG/5ML solution 5 mg  Every 4 Hours PRN         01/21/25 0947    01/20/25 1137  Potassium Replacement - Follow Nurse / BPA Driven Protocol  As Needed         01/20/25 1140    01/17/25 0260   ondansetron (ZOFRAN) injection 4 mg  Every 6 Hours PRN         01/17/25 1332    01/15/25 0600  Daily CHG Bath While Central Line in Place  Daily       01/14/25 1428    01/14/25 1639  Magnesium Standard Dose Replacement - Follow Nurse / BPA Driven Protocol  As Needed         01/14/25 1640    01/14/25 1428  sodium chloride 0.9 % flush 10 mL  As Needed         01/14/25 1428    01/14/25 1428  sodium chloride 0.9 % flush 20 mL  As Needed         01/14/25 1428    01/14/25 1400  midazolam (VERSED) injection 2 mg  Every 1 Hour PRN         01/14/25 1400    01/10/25 0929  Polyvinyl Alcohol-Povidone PF (ARTIFICIAL TEARS) 1.4-0.6 % ophthalmic solution 2 drop  Every 2 Hours PRN         01/10/25 0929    01/08/25 1600  Strict Intake & Output  Every Hour       01/08/25 1507    01/08/25 1508  Daily Weights  Daily       01/08/25 1507    01/03/25 2100  nystatin (MYCOSTATIN) powder  Every 12 Hours Scheduled         01/03/25 1817    01/02/25 1400  Incentive Spirometry  Every 4 Hours While Awake       01/02/25 1228    01/02/25 1228  ipratropium (ATROVENT) nebulizer solution 0.5 mg  Every 4 Hours PRN         01/02/25 1228    01/02/25 1228  ipratropium-albuterol (DUO-NEB) nebulizer solution 3 mL  Every 4 Hours PRN         01/02/25 1228    01/02/25 1200  Vital Signs Every Hour and Per Hospital Policy Based on Patient Condition  Every Hour       01/02/25 1143    01/02/25 1200  Intake & Output  Every Hour       01/02/25 1143    01/02/25 1144  Daily CHG Bath While in Critical Care  Daily      Comments: Use for admission bath and length of critical care stay.    01/02/25 1143    01/02/25 1143  Oral Care - Patient Not on NPPV & Not Intubated  Every Shift       01/02/25 1143    Unscheduled  Follow Hypoglycemia Standing Orders For Blood Glucose <70 & Notify Provider of Treatment  As Needed      Comments: Follow Hypoglycemia Orders As Outlined in Process Instructions (Open Order Report to View Full Instructions)  Notify Provider Any Time  Hypoglycemia Treatment is Administered    01/07/25 1349    Unscheduled  Follow Hypoglycemia Standing Orders For Blood Glucose <70 & Notify Provider of Treatment  As Needed      Comments: Follow Hypoglycemia Orders As Outlined in Process Instructions (Open Order Report to View Full Instructions)  Notify Provider Any Time Hypoglycemia Treatment is Administered    01/20/25 1140    Unscheduled  Assist With Feeding Patient  As Needed       01/27/25 1438    Unscheduled  Follow Hypoglycemia Standing Orders For Blood Glucose <70 & Notify Provider of Treatment  As Needed      Comments: Follow Hypoglycemia Orders As Outlined in Process Instructions (Open Order Report to View Full Instructions)  Notify Provider Any Time Hypoglycemia Treatment is Administered    01/27/25 1839    --  vitamin B-12 (CYANOCOBALAMIN) 1000 MCG tablet  Daily         01/03/25 1406    --  DULoxetine (CYMBALTA) 30 MG capsule  Daily         01/03/25 1407    --  DULoxetine (CYMBALTA) 30 MG capsule  Nightly         01/03/25 1407    --  metoprolol succinate XL (TOPROL-XL) 50 MG 24 hr tablet  2 Times Daily         01/03/25 1408    --  multivitamin with minerals (MULTIVITAMIN WOMEN PO)  Daily         01/03/25 1410    --  Enoxaparin Sodium (LOVENOX) 100 MG/ML solution prefilled syringe syringe  Every 12 Hours Scheduled         01/03/25 1410    Signed and Held  albumin human 25 % IV SOLN 12.5 g  As Needed         Signed and Held    --  Insulin Lispro (HumaLOG KwikPen) 200 UNIT/ML solution pen-injector         01/16/25 1202                  Operative/Procedure Notes (last 24 hours)  Notes from 01/30/25 0823 through 01/31/25 0823   No notes of this type exist for this encounter.       Physician Progress Notes (last 24 hours)  Notes from 01/30/25 0823 through 01/31/25 0823   No notes of this type exist for this encounter.       Consult Notes (last 24 hours)  Notes from 01/30/25 0823 through 01/31/25 0823   No notes of this type exist for this encounter.

## 2025-02-01 LAB
ALBUMIN SERPL-MCNC: 4.2 G/DL (ref 3.5–5.2)
ALBUMIN/GLOB SERPL: 1.2 G/DL
ALP SERPL-CCNC: 157 U/L (ref 39–117)
ALT SERPL W P-5'-P-CCNC: 41 U/L (ref 1–33)
ANION GAP SERPL CALCULATED.3IONS-SCNC: 13 MMOL/L (ref 5–15)
AST SERPL-CCNC: 55 U/L (ref 1–32)
BASOPHILS # BLD AUTO: 0.06 10*3/MM3 (ref 0–0.2)
BASOPHILS NFR BLD AUTO: 1.1 % (ref 0–1.5)
BILIRUB SERPL-MCNC: 0.3 MG/DL (ref 0–1.2)
BILIRUB UR QL STRIP: NEGATIVE
BUN SERPL-MCNC: 28 MG/DL (ref 6–20)
BUN/CREAT SERPL: 44.4 (ref 7–25)
CALCIUM SPEC-SCNC: 10.1 MG/DL (ref 8.6–10.5)
CHLORIDE SERPL-SCNC: 97 MMOL/L (ref 98–107)
CLARITY UR: CLEAR
CO2 SERPL-SCNC: 23 MMOL/L (ref 22–29)
COLOR UR: YELLOW
CREAT SERPL-MCNC: 0.63 MG/DL (ref 0.57–1)
DEPRECATED RDW RBC AUTO: 58.7 FL (ref 37–54)
EGFRCR SERPLBLD CKD-EPI 2021: 116.6 ML/MIN/1.73
EOSINOPHIL # BLD AUTO: 0.49 10*3/MM3 (ref 0–0.4)
EOSINOPHIL NFR BLD AUTO: 9.4 % (ref 0.3–6.2)
ERYTHROCYTE [DISTWIDTH] IN BLOOD BY AUTOMATED COUNT: 17.2 % (ref 12.3–15.4)
GLOBULIN UR ELPH-MCNC: 3.6 GM/DL
GLUCOSE BLDC GLUCOMTR-MCNC: 204 MG/DL (ref 70–130)
GLUCOSE BLDC GLUCOMTR-MCNC: 256 MG/DL (ref 70–130)
GLUCOSE BLDC GLUCOMTR-MCNC: 265 MG/DL (ref 70–130)
GLUCOSE BLDC GLUCOMTR-MCNC: 287 MG/DL (ref 70–130)
GLUCOSE SERPL-MCNC: 232 MG/DL (ref 65–99)
GLUCOSE UR STRIP-MCNC: NEGATIVE MG/DL
HCT VFR BLD AUTO: 27.4 % (ref 34–46.6)
HGB BLD-MCNC: 8.6 G/DL (ref 12–15.9)
HGB UR QL STRIP.AUTO: NEGATIVE
IMM GRANULOCYTES # BLD AUTO: 0.02 10*3/MM3 (ref 0–0.05)
IMM GRANULOCYTES NFR BLD AUTO: 0.4 % (ref 0–0.5)
KETONES UR QL STRIP: NEGATIVE
LEUKOCYTE ESTERASE UR QL STRIP.AUTO: NEGATIVE
LYMPHOCYTES # BLD AUTO: 1.54 10*3/MM3 (ref 0.7–3.1)
LYMPHOCYTES NFR BLD AUTO: 29.5 % (ref 19.6–45.3)
MAGNESIUM SERPL-MCNC: 1.2 MG/DL (ref 1.6–2.6)
MCH RBC QN AUTO: 29.8 PG (ref 26.6–33)
MCHC RBC AUTO-ENTMCNC: 31.4 G/DL (ref 31.5–35.7)
MCV RBC AUTO: 94.8 FL (ref 79–97)
MONOCYTES # BLD AUTO: 0.51 10*3/MM3 (ref 0.1–0.9)
MONOCYTES NFR BLD AUTO: 9.8 % (ref 5–12)
NEUTROPHILS NFR BLD AUTO: 2.6 10*3/MM3 (ref 1.7–7)
NEUTROPHILS NFR BLD AUTO: 49.8 % (ref 42.7–76)
NITRITE UR QL STRIP: NEGATIVE
NRBC BLD AUTO-RTO: 0 /100 WBC (ref 0–0.2)
PH UR STRIP.AUTO: <=5 [PH] (ref 5–8)
PLATELET # BLD AUTO: 214 10*3/MM3 (ref 140–450)
PMV BLD AUTO: 9.6 FL (ref 6–12)
POTASSIUM SERPL-SCNC: 5 MMOL/L (ref 3.5–5.2)
PROT SERPL-MCNC: 7.8 G/DL (ref 6–8.5)
PROT UR QL STRIP: ABNORMAL
RBC # BLD AUTO: 2.89 10*6/MM3 (ref 3.77–5.28)
SODIUM SERPL-SCNC: 133 MMOL/L (ref 136–145)
SP GR UR STRIP: 1.02 (ref 1–1.03)
UROBILINOGEN UR QL STRIP: ABNORMAL
WBC NRBC COR # BLD AUTO: 5.22 10*3/MM3 (ref 3.4–10.8)

## 2025-02-01 PROCEDURE — 63710000001 INSULIN GLARGINE PER 5 UNITS: Performed by: NURSE PRACTITIONER

## 2025-02-01 PROCEDURE — 25010000002 MAGNESIUM SULFATE 2 GM/50ML SOLUTION: Performed by: INTERNAL MEDICINE

## 2025-02-01 PROCEDURE — 99232 SBSQ HOSP IP/OBS MODERATE 35: CPT | Performed by: NURSE PRACTITIONER

## 2025-02-01 PROCEDURE — 25010000002 ONDANSETRON PER 1 MG: Performed by: INTERNAL MEDICINE

## 2025-02-01 PROCEDURE — 63710000001 INSULIN LISPRO (HUMAN) PER 5 UNITS

## 2025-02-01 PROCEDURE — 63710000001 INSULIN LISPRO (HUMAN) PER 5 UNITS: Performed by: INTERNAL MEDICINE

## 2025-02-01 PROCEDURE — 97597 DBRDMT OPN WND 1ST 20 CM/<: CPT

## 2025-02-01 PROCEDURE — 25010000002 HYDROMORPHONE 1 MG/ML SOLUTION: Performed by: INTERNAL MEDICINE

## 2025-02-01 PROCEDURE — 85025 COMPLETE CBC W/AUTO DIFF WBC: CPT | Performed by: INTERNAL MEDICINE

## 2025-02-01 PROCEDURE — 81003 URINALYSIS AUTO W/O SCOPE: CPT | Performed by: NURSE PRACTITIONER

## 2025-02-01 PROCEDURE — 83735 ASSAY OF MAGNESIUM: CPT | Performed by: INTERNAL MEDICINE

## 2025-02-01 PROCEDURE — 80053 COMPREHEN METABOLIC PANEL: CPT | Performed by: INTERNAL MEDICINE

## 2025-02-01 PROCEDURE — 82948 REAGENT STRIP/BLOOD GLUCOSE: CPT

## 2025-02-01 RX ORDER — OXYCODONE HYDROCHLORIDE 10 MG/1
10 TABLET ORAL EVERY 4 HOURS PRN
Status: DISCONTINUED | OUTPATIENT
Start: 2025-02-01 | End: 2025-02-03 | Stop reason: HOSPADM

## 2025-02-01 RX ORDER — ACETAMINOPHEN 160 MG/5ML
1000 SOLUTION ORAL EVERY 8 HOURS
Status: DISCONTINUED | OUTPATIENT
Start: 2025-02-01 | End: 2025-02-03 | Stop reason: HOSPADM

## 2025-02-01 RX ORDER — LISINOPRIL 10 MG/1
10 TABLET ORAL
Status: DISCONTINUED | OUTPATIENT
Start: 2025-02-01 | End: 2025-02-03 | Stop reason: HOSPADM

## 2025-02-01 RX ORDER — METHOCARBAMOL 500 MG/1
500 TABLET, FILM COATED ORAL EVERY 8 HOURS PRN
Status: DISCONTINUED | OUTPATIENT
Start: 2025-02-01 | End: 2025-02-03 | Stop reason: HOSPADM

## 2025-02-01 RX ORDER — MAGNESIUM SULFATE HEPTAHYDRATE 40 MG/ML
2 INJECTION, SOLUTION INTRAVENOUS
Status: COMPLETED | OUTPATIENT
Start: 2025-02-01 | End: 2025-02-01

## 2025-02-01 RX ADMIN — INSULIN LISPRO 20 UNITS: 100 INJECTION, SOLUTION INTRAVENOUS; SUBCUTANEOUS at 08:58

## 2025-02-01 RX ADMIN — OXYCODONE HYDROCHLORIDE 10 MG: 10 TABLET ORAL at 16:11

## 2025-02-01 RX ADMIN — METHOCARBAMOL 500 MG: 500 TABLET ORAL at 14:31

## 2025-02-01 RX ADMIN — DULOXETINE HYDROCHLORIDE 30 MG: 30 CAPSULE, DELAYED RELEASE ORAL at 20:41

## 2025-02-01 RX ADMIN — MAGNESIUM SULFATE HEPTAHYDRATE 2 G: 40 INJECTION, SOLUTION INTRAVENOUS at 13:43

## 2025-02-01 RX ADMIN — Medication 10 ML: at 08:26

## 2025-02-01 RX ADMIN — HYDROXYZINE HYDROCHLORIDE 25 MG: 25 TABLET ORAL at 20:41

## 2025-02-01 RX ADMIN — MAGNESIUM SULFATE HEPTAHYDRATE 2 G: 40 INJECTION, SOLUTION INTRAVENOUS at 08:50

## 2025-02-01 RX ADMIN — LISINOPRIL 10 MG: 10 TABLET ORAL at 10:30

## 2025-02-01 RX ADMIN — Medication 1 CAPSULE: at 08:38

## 2025-02-01 RX ADMIN — METHOCARBAMOL 500 MG: 500 TABLET ORAL at 22:31

## 2025-02-01 RX ADMIN — HYDROCODONE BITARTRATE AND ACETAMINOPHEN 1 TABLET: 5; 325 TABLET ORAL at 11:12

## 2025-02-01 RX ADMIN — METOPROLOL SUCCINATE 50 MG: 50 TABLET, EXTENDED RELEASE ORAL at 08:36

## 2025-02-01 RX ADMIN — HYDROMORPHONE HYDROCHLORIDE 1 MG: 1 INJECTION, SOLUTION INTRAMUSCULAR; INTRAVENOUS; SUBCUTANEOUS at 08:25

## 2025-02-01 RX ADMIN — APIXABAN 5 MG: 5 TABLET, FILM COATED ORAL at 08:38

## 2025-02-01 RX ADMIN — NYSTATIN: 100000 POWDER TOPICAL at 08:40

## 2025-02-01 RX ADMIN — AMITRIPTYLINE HYDROCHLORIDE 25 MG: 25 TABLET, FILM COATED ORAL at 20:41

## 2025-02-01 RX ADMIN — AMLODIPINE BESYLATE 10 MG: 10 TABLET ORAL at 08:37

## 2025-02-01 RX ADMIN — OXYCODONE HYDROCHLORIDE 10 MG: 10 TABLET ORAL at 20:41

## 2025-02-01 RX ADMIN — OXYCODONE HYDROCHLORIDE 5 MG: 5 SOLUTION ORAL at 13:39

## 2025-02-01 RX ADMIN — GABAPENTIN 300 MG: 300 CAPSULE ORAL at 13:41

## 2025-02-01 RX ADMIN — METOCLOPRAMIDE 10 MG: 10 TABLET ORAL at 13:41

## 2025-02-01 RX ADMIN — GABAPENTIN 300 MG: 300 CAPSULE ORAL at 20:41

## 2025-02-01 RX ADMIN — INSULIN LISPRO 12 UNITS: 100 INJECTION, SOLUTION INTRAVENOUS; SUBCUTANEOUS at 18:26

## 2025-02-01 RX ADMIN — ACETAMINOPHEN 1000 MG: 650 SOLUTION ORAL at 15:05

## 2025-02-01 RX ADMIN — INSULIN GLARGINE 54 UNITS: 100 INJECTION, SOLUTION SUBCUTANEOUS at 20:40

## 2025-02-01 RX ADMIN — INSULIN LISPRO 20 UNITS: 100 INJECTION, SOLUTION INTRAVENOUS; SUBCUTANEOUS at 18:27

## 2025-02-01 RX ADMIN — ONDANSETRON 4 MG: 2 INJECTION INTRAMUSCULAR; INTRAVENOUS at 11:09

## 2025-02-01 RX ADMIN — GABAPENTIN 300 MG: 300 CAPSULE ORAL at 04:25

## 2025-02-01 RX ADMIN — HYDROMORPHONE HYDROCHLORIDE 1 MG: 1 INJECTION, SOLUTION INTRAMUSCULAR; INTRAVENOUS; SUBCUTANEOUS at 04:25

## 2025-02-01 RX ADMIN — INSULIN LISPRO 12 UNITS: 100 INJECTION, SOLUTION INTRAVENOUS; SUBCUTANEOUS at 08:57

## 2025-02-01 RX ADMIN — METOCLOPRAMIDE 10 MG: 10 TABLET ORAL at 08:37

## 2025-02-01 RX ADMIN — METOCLOPRAMIDE 10 MG: 10 TABLET ORAL at 18:26

## 2025-02-01 RX ADMIN — DULOXETINE HYDROCHLORIDE 60 MG: 60 CAPSULE, DELAYED RELEASE ORAL at 08:38

## 2025-02-01 RX ADMIN — NYSTATIN: 100000 POWDER TOPICAL at 20:41

## 2025-02-01 RX ADMIN — METOPROLOL SUCCINATE 50 MG: 50 TABLET, EXTENDED RELEASE ORAL at 20:41

## 2025-02-01 RX ADMIN — COLLAGENASE SANTYL 1 APPLICATION: 250 OINTMENT TOPICAL at 08:40

## 2025-02-01 RX ADMIN — INSULIN LISPRO 8 UNITS: 100 INJECTION, SOLUTION INTRAVENOUS; SUBCUTANEOUS at 13:42

## 2025-02-01 RX ADMIN — MAGNESIUM SULFATE HEPTAHYDRATE 2 G: 40 INJECTION, SOLUTION INTRAVENOUS at 11:13

## 2025-02-01 RX ADMIN — APIXABAN 5 MG: 5 TABLET, FILM COATED ORAL at 20:41

## 2025-02-01 RX ADMIN — INSULIN GLARGINE 54 UNITS: 100 INJECTION, SOLUTION SUBCUTANEOUS at 10:30

## 2025-02-01 RX ADMIN — INSULIN LISPRO 12 UNITS: 100 INJECTION, SOLUTION INTRAVENOUS; SUBCUTANEOUS at 20:40

## 2025-02-01 RX ADMIN — INSULIN LISPRO 20 UNITS: 100 INJECTION, SOLUTION INTRAVENOUS; SUBCUTANEOUS at 13:43

## 2025-02-01 RX ADMIN — AVOBENZONE, HOMOSALATE, OCTISALATE, OCTOCRYLENE, AND OXYBENZONE 1 PACKET: 29.4; 147; 49; 25.4; 58.8 LOTION TOPICAL at 08:38

## 2025-02-01 NOTE — THERAPY WOUND CARE TREATMENT
Acute Care - Wound/Debridement Treatment Note  [facility]     Patient Name: Natalia Arzate  : 1985  MRN: [id number]  Today's Date: 2025                Admit Date: 2025    Visit Dx:    ICD-10-CM ICD-9-CM   1. Respiratory acidosis with metabolic acidosis  E87.4 276.3   2. Acute respiratory failure with hypoxia  J96.01 518.81   3. MARIAA (acute kidney injury)  N17.9 584.9   4. Pharyngeal dysphagia  R13.13 787.23       Patient Active Problem List   Diagnosis    Nephrolithiasis    Ovarian teratoma, right    Other chronic pain    Pelvic pain    History of DVT in adulthood    History of pulmonary embolism    Ureteral calculus    Ischemic cerebrovascular accident (CVA)    Primary hypertension    Left lumbar radiculopathy    PTSD (post-traumatic stress disorder)    MARIAA (acute kidney injury)    Anemia    Dyslipidemia    Hypothyroid    Other secondary gout, multiple sites    Factor 5 Leiden mutation, heterozygous    Vitamin D deficiency    Vitamin B 12 deficiency    Type 2 diabetes mellitus with hyperglycemia    Hoarseness, persistent    Ureterolithiasis    Acute cystitis without hematuria    Hepatic steatosis    Right flank pain, chronic    Detrusor instability of bladder    Frequency of micturition    Acute respiratory failure with hypercapnia    Hyperkalemia    Sepsis    Respiratory acidosis with metabolic acidosis    Acute respiratory failure    Diabetes mellitus type 2, insulin dependent    Diabetic peripheral neuropathy associated with type 2 diabetes mellitus        Past Medical History:   Diagnosis Date    A-fib     Abnormal ECG     Anemia     Anxiety     Asthma     Cancer     Ovarian    Depression     Diabetes mellitus     DVT (deep venous thrombosis)     Factor 5 Leiden mutation, heterozygous     Fibroid     GERD (gastroesophageal reflux disease)     Gout     H/O abdominal abscess     History of sepsis     History of transfusion     Hyperlipidemia     Hypothyroid     Kidney stone     Migraines      Neuropathy     Ovarian cancer 2021    Ovarian cyst     PE (pulmonary embolism)     Polycystic ovary syndrome     Preeclampsia     Rh incompatibility     Stroke     TIA (transient ischemic attack)     Urinary tract infection     Varicella         Past Surgical History:   Procedure Laterality Date    ABDOMINAL SURGERY      CARDIAC CATHETERIZATION       SECTION      CHOLECYSTECTOMY      COLONOSCOPY      ENDOSCOPY      EXTRACORPOREAL SHOCK WAVE LITHOTRIPSY (ESWL) Left 10/22/2021    Procedure: EXTRACORPOREAL SHOCKWAVE LITHOTRIPSY;  Surgeon: Milan Motley MD;  Location: [facility] OR;  Service: Urology;  Laterality: Left;    HYSTERECTOMY  2017    ovarian ca    INSERT CENTRAL LINE AT BEDSIDE  2025    LITHOTRIPSY      NEPHRECTOMY Left 10/2022    RIGHT OOPHORECTOMY      URETEROSCOPY LASER LITHOTRIPSY WITH STENT INSERTION Left 10/01/2021    Procedure: URETEROSCOPY WITH STENT PLACEMENT;  Surgeon: Milan Motley MD;  Location: [facility] OR;  Service: Urology;  Laterality: Left;    URETEROSCOPY LASER LITHOTRIPSY WITH STENT INSERTION Left 2022    Procedure: URETEROSCOPY STENT REMOVAL;  Surgeon: Milan Motley MD;  Location: [facility] OR;  Service: Urology;  Laterality: Left;    URETEROSCOPY LASER LITHOTRIPSY WITH STENT INSERTION Left 2022    Procedure: CYSTOSCOPY RETROGRADE LEFT WITH STENT PLACEMENT;  Surgeon: Milan Motley MD;  Location:  [facility] OR;  Service: Urology;  Laterality: Left;           Wound 25 0400 Left lateral flank (Active)   Dressing Appearance intact 25 0850   Closure Adhesive bandage 25 2045   Base moist;pink;red;slough;yellow 25 0850   Periwound dry;intact 25 0850   Periwound Temperature warm 25 0850   Periwound Skin Turgor soft 25 0850   Edges open;irregular 25 0850   Drainage Characteristics/Odor serosanguineous 25 0850   Drainage Amount small 25 0850   Care, Wound irrigated with;wound  cleanser;debrided 02/01/25 0850   Dressing Care foam;low-adherent 02/01/25 0850   Periwound Care dry periwound area maintained 02/01/25 0850       Wound 01/15/25 0314 Left gluteal blisters (Active)   Dressing Appearance open to air 01/31/25 2045   Closure Open to air 01/31/25 2045   Dressing Care open to air 01/31/25 2045       Wound 01/17/25 2133 Right lateral throat (Active)   Closure Open to air 01/31/25 2045   Dressing Care open to air 01/31/25 2045       Wound 01/21/25 0746 lumbar spine (Active)   Dressing Appearance intact;dry 01/31/25 2045   Closure Adhesive bandage 01/31/25 2045         WOUND DEBRIDEMENT  Total area of Debridement: ~4cm2  Debridement Site 1  Location- Site 1: L lateral back  Selective Debridement- Site 1: Wound Surface <20cmsq  Instruments- Site 1: tweezers  Excised Tissue Description- Site 1: minimum, slough  Bleeding- Site 1: none               PT Assessment (Last 12 Hours)       PT Evaluation and Treatment       Row Name 02/01/25 0850          Physical Therapy Time and Intention    Subjective Information no complaints  -MF     Document Type therapy note (daily note);wound care  -MF     Mode of Treatment individual therapy;physical therapy  -MF       Row Name 02/01/25 0850          Pain    Pretreatment Pain Rating 0/10 - no pain  -MF     Posttreatment Pain Rating 0/10 - no pain  -MF       Row Name 02/01/25 0850          Wound 01/05/25 0400 Left lateral flank    Wound - Properties Group Placement Date: 01/05/25  -BC Placement Time: 0400  -BC Side: Left  -BC Orientation: lateral  -BC Location: flank  -BC    Dressing Appearance intact  -MF     Base moist;pink;red;slough;yellow  -MF     Periwound dry;intact  -MF     Periwound Temperature warm  -MF     Periwound Skin Turgor soft  -MF     Edges open;irregular  -MF     Drainage Characteristics/Odor serosanguineous  -MF     Drainage Amount small  -MF     Care, Wound irrigated with;wound cleanser;debrided  -MF     Dressing Care foam;low-adherent   santyl with nsaline moistened gauze with optifoam to cover  -MF     Periwound Care dry periwound area maintained  -MF     Retired Wound - Properties Group Placement Date: 01/05/25  -BC Placement Time: 0400  -BC Side: Left  -BC Orientation: lateral  -BC Location: flank  -BC    Retired Wound - Properties Group Placement Date: 01/05/25  -BC Placement Time: 0400  -BC Side: Left  -BC Orientation: lateral  -BC Location: flank  -BC    Retired Wound - Properties Group Date first assessed: 01/05/25  -BC Time first assessed: 0400  -BC Side: Left  -BC Location: flank  -BC      Row Name             Wound 01/15/25 0314 Left gluteal blisters    Wound - Properties Group Placement Date: 01/15/25  -SE Placement Time: 0314  -SE Side: Left  -SE Location: gluteal  -SE Primary Wound Type: Blisters  -SE Type: blisters  -SE Present on Original Admission: N  -SE    Retired Wound - Properties Group Placement Date: 01/15/25  -SE Placement Time: 0314  -SE Present on Original Admission: N  -SE Side: Left  -SE Location: gluteal  -SE Primary Wound Type: Blisters  -SE Type: blisters  -SE    Retired Wound - Properties Group Placement Date: 01/15/25  -SE Placement Time: 0314  -SE Present on Original Admission: N  -SE Side: Left  -SE Location: gluteal  -SE Primary Wound Type: Blisters  -SE Type: blisters  -SE    Retired Wound - Properties Group Date first assessed: 01/15/25  -SE Time first assessed: 0314  -SE Present on Original Admission: N  -SE Side: Left  -SE Location: gluteal  -SE Primary Wound Type: Blisters  -SE Type: blisters  -SE      Row Name             Wound 01/17/25 2133 Right lateral throat    Wound - Properties Group Placement Date: 01/17/25  -MH Placement Time: 2133 -MH Side: Right  -MH Orientation: lateral  -MH Location: throat  -MH Primary Wound Type: Puncture  -MH    Retired Wound - Properties Group Placement Date: 01/17/25  -MH Placement Time: 2133 -MH Side: Right  -MH Orientation: lateral  -MH Location: throat  -MH Primary  Wound Type: Puncture  -MH    Retired Wound - Properties Group Placement Date: 01/17/25  - Placement Time: 2133 -MH Side: Right  -MH Orientation: lateral  -MH Location: throat  -MH Primary Wound Type: Puncture  -MH    Retired Wound - Properties Group Date first assessed: 01/17/25  - Time first assessed: 2133 -MH Side: Right  -MH Location: throat  -MH Primary Wound Type: Puncture  -MH      Row Name             Wound 01/21/25 0746 lumbar spine    Wound - Properties Group Placement Date: 01/21/25  - Placement Time: 0746 -AH Location: lumbar spine  -AH Primary Wound Type: MASD  -AH, large white wound, appearing to be worse than MASD  Present on Original Admission: N  - Additional Comments: nurse put zeroform and mepliex  -AH    Retired Wound - Properties Group Placement Date: 01/21/25  - Placement Time: 0746  -AH Present on Original Admission: N  -AH Location: lumbar spine  -AH Primary Wound Type: MASD  -AH, large white wound, appearing to be worse than MASD  Additional Comments: nurse put zeroform and mepliex  -AH    Retired Wound - Properties Group Placement Date: 01/21/25  - Placement Time: 0746  -AH Present on Original Admission: N  -AH Location: lumbar spine  -AH Primary Wound Type: MASD  -AH, large white wound, appearing to be worse than MASD  Additional Comments: nurse put zeroform and mepliex  -AH    Retired Wound - Properties Group Date first assessed: 01/21/25  - Time first assessed: 0746  -AH Present on Original Admission: N  -AH Location: lumbar spine  -AH Primary Wound Type: MASD  -AH, large white wound, appearing to be worse than MASD  Additional Comments: nurse put zeroform and mepliex  -AH      Row Name 02/01/25 0850          Coping    Observed Emotional State calm;cooperative  -MF     Verbalized Emotional State acceptance  -MF     Trust Relationship/Rapport care explained  -MF       Row Name 02/01/25 0850          Plan of Care Review    Plan of Care Reviewed With patient  -MF     Outcome  Evaluation L flank wound dressing changed and PT was able to debride a moderate amount of slough from the wound base. PT recovered wound with santyl to help further soften slough to allow for further debridement. PT will f/u with dressing management in 2-3 days.  -       Row Name 02/01/25 0850          Positioning and Restraints    Pre-Treatment Position in bed  -MF     Post Treatment Position bed  -MF     In Bed supine;call light within reach;with nsg  -               User Key  (r) = Recorded By, (t) = Taken By, (c) = Cosigned By      Initials Name Provider Type    MF Ted Carbone, PT Physical Therapist    Jojo Moe RN Registered Nurse    Chary Bloom RN Registered Nurse    Viola Espinoza RN Registered Nurse    Rajani Montilla RN Registered Nurse                  Physical Therapy Education       Title: PT OT SLP Therapies (In Progress)       Topic: Physical Therapy (In Progress)       Point: Mobility training (In Progress)       Learning Progress Summary            Patient Acceptance, E, VU by ER at 1/30/2025 1059    Comment: progress, goals, POC    Acceptance, E, VU by ER at 1/27/2025 1127    Comment: progress, POC, need for PT    Acceptance, E, VU,NR by ML at 1/24/2025 1151    Acceptance, E, VU,NR by NS at 1/22/2025 1613    Comment: pt encouraged to continue with HEP between therapy sessions    Acceptance, E, NR by ML at 1/19/2025 1124   Family Acceptance, E, NR by ML at 1/19/2025 1124                      Point: Home exercise program (Done)       Learning Progress Summary            Patient Acceptance, E, VU by ER at 1/30/2025 1059    Comment: progress, goals, POC    Acceptance, E, VU by ER at 1/27/2025 1127    Comment: progress, POC, need for PT    Acceptance, E, VU,NR by ML at 1/24/2025 1151    Acceptance, E, VU,NR by NS at 1/22/2025 1613    Comment: pt encouraged to continue with HEP between therapy sessions                      Point: Body mechanics (Done)        Learning Progress Summary            Patient Acceptance, E, VU by ER at 1/30/2025 1059    Comment: progress, goals, POC    Acceptance, E, VU by ER at 1/27/2025 1127    Comment: progress, POC, need for PT    Acceptance, E, VU,NR by ML at 1/24/2025 1151    Acceptance, E, VU,NR by NS at 1/22/2025 1613    Comment: pt encouraged to continue with HEP between therapy sessions                      Point: Precautions (In Progress)       Learning Progress Summary            Patient Acceptance, E, VU by ER at 1/30/2025 1059    Comment: progress, goals, POC    Acceptance, E, VU by ER at 1/27/2025 1127    Comment: progress, POC, need for PT    Acceptance, E, VU,NR by NS at 1/22/2025 1613    Comment: pt encouraged to continue with HEP between therapy sessions    Acceptance, E, NR by ML at 1/19/2025 1124   Family Acceptance, E, NR by ML at 1/19/2025 1124                                      User Key       Initials Effective Dates Name Provider Type Discipline    NS 06/16/21 -  Germaine Thao, PT Physical Therapist PT     04/22/21 -  Debbi Hall Physical Therapist PT    ER 12/13/24 -  Yolande Nassar PT Physical Therapist PT                    Recommendation and Plan  Anticipated Discharge Disposition (PT): inpatient rehabilitation facility  Planned Therapy Interventions (PT): balance training, bed mobility training, gait training, home exercise program, neuromuscular re-education, strengthening, transfer training, stretching, postural re-education, patient/family education, ROM (range of motion), motor coordination training, manual therapy techniques  Therapy Frequency (PT): daily  Plan of Care Reviewed With: patient           Outcome Evaluation: L flank wound dressing changed and PT was able to debride a moderate amount of slough from the wound base. PT recovered wound with santyl to help further soften slough to allow for further debridement. PT will f/u with dressing management in 2-3 days.  Plan of Care Reviewed With:  patient            Time Calculation   PT Charges       Row Name 02/01/25 0850             Time Calculation    Start Time 0850  -MF      PT Goal Re-Cert Due Date 02/09/25  -MF         Untimed Charges    [id number]-[id number] debridement 15  -MF         Total Minutes    Untimed Charges Total Minutes 15  -MF       Total Minutes 15  -MF                User Key  (r) = Recorded By, (t) = Taken By, (c) = Cosigned By      Initials Name Provider Type    Ted Prescott, PT Physical Therapist                            PT G-Codes  Outcome Measure Options: AM-PAC 6 Clicks Basic Mobility (PT)  AM-PAC 6 Clicks Score (PT): 15  AM-PAC 6 Clicks Score (OT): 14       Ted Carbone, PT  2/1/2025

## 2025-02-01 NOTE — PROGRESS NOTES
[facility] Medicine Services  PROGRESS NOTE    Patient Name: Natalia Arzate  : 1985  MRN: [id number]    Date of Admission: 2025  Primary Care Physician: Bladimir Calle PA-C    Subjective   Subjective     CC:  Resp failure    HPI:  Patient up in bed. Visitor in room preparing toast for patient  Reports some right sided back and abdominal/ hip pain    Objective   Objective     Vital Signs:   Temp:  [97 °F (36.1 °C)-98.4 °F (36.9 °C)] 97.6°F (36.9 °C)  Heart Rate:  [] 91  Resp:  [18] 18  BP: (127-132)/() 127/81     Physical Exam:  Constitutional: No acute distress, awake, alert, upright in bed preparing for breakfast  HENT: NCAT, mucous membranes moist- Left IJ  Respiratory: Clear to auscultation bilaterally, distant sounds 2/2  body habitus, respiratory effort normal   Cardiovascular: RRR  Gastrointestinal: Positive bowel sounds, soft, nontender, nondistended- morbidly obese with BMI 55  Musculoskeletal: obese LE  Psychiatric: Appropriate affect, cooperative  Neurologic: Oriented x 3, ROBISON, speech clear  Skin: No rashes     Results Reviewed:  LAB RESULTS:      Lab 25  0446 25  0342 25  1603 25  0554 25  0702 25  0552 25  0224   WBC 5.22 3.94  --  5.58  --   --  6.05   HEMOGLOBIN 8.6* 8.4*  --  9.0*  --   --  9.7*   HEMATOCRIT 27.4* 26.0*  --  27.6*  --   --  30.1*   PLATELETS 214 162  --  167  --   --  188   NEUTROS ABS 2.60 1.80  --  3.18  --   --  3.57   IMMATURE GRANS (ABS) 0.02 0.01  --  0.02  --   --  0.01   LYMPHS ABS 1.54 1.27  --  1.47  --   --  1.48   MONOS ABS 0.51 0.56  --  0.52  --   --  0.57   EOS ABS 0.49* 0.27  --  0.33  --   --  0.38   MCV 94.8 93.2  --  90.2  --   --  90.7   CRP  --   --   --   --   --   --  3.58*   HEPARIN ANTI-XA  --   --  0.90  --   --   --   --    HSTROP T  --   --   --   --  36* 34*  --          Lab 25  0446 25  0342 25  0554 25  0224   SODIUM 133* 130*  131* 126*   POTASSIUM 5.0 4.5 4.7 4.1   CHLORIDE 97* 93* 91* 86*   CO2 23.0 21.0* 24.0 27.0   ANION GAP 13.0 16.0* 16.0* 13.0   BUN 28* 45* 36* 28*   CREATININE 0.63 0.95 0.63 0.58   EGFR 116.6 78.8 116.6 119.0   GLUCOSE 232* 306* 235* 241*   CALCIUM 10.1 10.1 10.8* 10.8*   MAGNESIUM 1.2* 1.1* 1.6 1.2*   PHOSPHORUS  --   --   --  4.5         Lab 02/01/25  0446 01/30/25  0342 01/26/25  0224   TOTAL PROTEIN 7.8 7.7 8.1   ALBUMIN 4.2 4.1 4.1   GLOBULIN 3.6 3.6 4.0   ALT (SGPT) 41* 35* 38*   AST (SGOT) 55* 60* 64*   BILIRUBIN 0.3 0.4 0.6   ALK PHOS 157* 136* 140*         Lab 01/26/25  0702 01/26/25  0552   HSTROP T 36* 34*                       Brief Urine Lab Results  (Last result in the past 365 days)        Color   Clarity   Blood   Leuk Est   Nitrite   Protein   CREAT   Urine HCG        01/02/25 1213             52.3         01/02/25 1213 Yellow   Cloudy   Moderate (2+)   Negative   Negative   100 mg/dL (2+)                   Microbiology Results Abnormal       Procedure Component Value - Date/Time    Respiratory Culture - Wash, Lung, Left Lower Lobe [[id number]]  (Abnormal)  (Susceptibility) Collected: 01/14/25 1608    Lab Status: Final result Specimen: Wash from Lung, Left Lower Lobe Updated: 01/17/25 1221     Respiratory Culture Light growth (2+) Klebsiella pneumoniae ESBL     Comment:   Consider infectious disease consult.  Susceptibility results may not correlate to clinical outcomes.         Light growth (2+) Staphylococcus aureus, MRSA     Comment:   Considering site of infection and appropriate dosing, oxacillin (or cefoxitin) results can be applied to cefazolin, nafcillin, ampicillin/sulbactam, dicloxacillin, amoxicillin/clavulanate, cephalexin, and cefpodoxime.  Methicillin resistant Staphylococcus aureus, Patient may be an isolation risk.         Rare growth Normal Respiratory Margo     Gram Stain Many (4+) WBCs per low power field      Rare (1+) Epithelial cells per low power field      Rare (1+) Gram  positive cocci in pairs and clusters    Susceptibility        Klebsiella pneumoniae ESBL      KELSIE      Ciprofloxacin Resistant      Ertapenem Susceptible      Levofloxacin Resistant      Meropenem Susceptible      Tetracycline Resistant      Trimethoprim + Sulfamethoxazole Resistant                       Susceptibility        Staphylococcus aureus, MRSA      KELSIE      Clindamycin Resistant      Linezolid Susceptible      Oxacillin Resistant      Tetracycline Susceptible      Trimethoprim + Sulfamethoxazole Susceptible      Vancomycin Susceptible                       Susceptibility Comments       Klebsiella pneumoniae ESBL    With the exception of urinary-sourced infections, aminoglycosides should not be used as monotherapy.      Staphylococcus aureus, MRSA    This isolate is presumed to be clindamycin resistant based on detection of inducible clindamycin resistance.  Clindamycin may still be effective in some patients.  This isolate is presumed to be clindamycin resistant based on detection of inducible clindamycin resistance.  Clindamycin may still be effective in some patients.               Respiratory Culture - Sputum, ET Suction [[id number]]  (Abnormal) Collected: 01/14/25 4860    Lab Status: Final result Specimen: Sputum from ET Suction Updated: 01/17/25 1221     Respiratory Culture Light growth (2+) Klebsiella pneumoniae ESBL     Comment:   Consider infectious disease consult.  Susceptibility results may not correlate to clinical outcomes.         Light growth (2+) Staphylococcus aureus, MRSA     Comment:   Considering site of infection and appropriate dosing, oxacillin (or cefoxitin) results can be applied to cefazolin, nafcillin, ampicillin/sulbactam, dicloxacillin, amoxicillin/clavulanate, cephalexin, and cefpodoxime.  Methicillin resistant Staphylococcus aureus, Patient may be an isolation risk.        Gram Stain Many (4+) WBCs per low power field      Rare (1+) Epithelial cells per low power field       Few (2+) Gram positive cocci in pairs, chains and clusters    Narrative:      Refer to LLL Wash culture for MICS.     Blood Culture - Blood, Blood, Arterial Line [[id number]]  (Abnormal) Collected: 01/14/25 1434    Lab Status: Final result Specimen: Blood, Arterial Line Updated: 01/17/25 0636     Blood Culture Staphylococcus, coagulase negative     Isolated from Anaerobic Bottle     Gram Stain Anaerobic Bottle Gram positive cocci in pairs and clusters    Narrative:      Probable contaminant requires clinical correlation, susceptibility not performed unless requested by physician.      Blood Culture ID, PCR - Blood, Blood, Arterial Line [[id number]]  (Abnormal) Collected: 01/14/25 1434    Lab Status: Final result Specimen: Blood, Arterial Line Updated: 01/16/25 0657     BCID, PCR Staph spp, not aureus or lugdunensis. Identification by BCID2 PCR.     BOTTLE TYPE Anaerobic Bottle    Blood Culture - Blood, Arm, Left [[id number]]  (Abnormal) Collected: 01/05/25 1305    Lab Status: Final result Specimen: Blood from Arm, Left Updated: 01/07/25 1100     Blood Culture Staphylococcus, coagulase negative     Isolated from Anaerobic Bottle     Gram Stain Anaerobic Bottle Gram positive cocci in clusters    Narrative:      Less than seven (7) mL's of blood was collected.  Insufficient quantity may yield false negative results.  Probable contaminant requires clinical correlation, susceptibility not performed unless requested by physician.    Blood Culture ID, PCR - Blood, Arm, Left [[id number]]  (Abnormal) Collected: 01/05/25 1305    Lab Status: Final result Specimen: Blood from Arm, Left Updated: 01/06/25 1435     BCID, PCR Staph spp, not aureus or lugdunensis. Identification by BCID2 PCR.     BOTTLE TYPE Anaerobic Bottle    Respiratory Culture - Bronchial Wash, Lung, Left Lower Lobe [[id number]]  (Abnormal)  (Susceptibility) Collected: 01/04/25 0755    Lab Status: Final result Specimen: Bronchial Wash from Lung, Left Lower Lobe  Updated: 01/06/25 0908     Respiratory Culture Scant growth (1+) Staphylococcus aureus, MRSA     Comment:   Methicillin resistant Staphylococcus aureus, Patient may be an isolation risk.         No Normal Respiratory Margo     Gram Stain Moderate (3+) WBCs seen      Rare (1+) Gram positive cocci in pairs and clusters    Susceptibility        Staphylococcus aureus, MRSA      KELSIE      Clindamycin Resistant      Linezolid Susceptible      Oxacillin Resistant      Tetracycline Susceptible      Trimethoprim + Sulfamethoxazole Susceptible      Vancomycin Susceptible                       Susceptibility Comments       Staphylococcus aureus, MRSA    This isolate is presumed to be clindamycin resistant based on detection of inducible clindamycin resistance.  Clindamycin may still be effective in some patients.  This isolate is presumed to be clindamycin resistant based on detection of inducible clindamycin resistance.  Clindamycin may still be effective in some patients.               Respiratory Culture - Sputum, ET Suction [[id number]]  (Abnormal)  (Susceptibility) Collected: 01/02/25 1212    Lab Status: Final result Specimen: Sputum from ET Suction Updated: 01/04/25 0939     Respiratory Culture Moderate growth (3+) Staphylococcus aureus, MRSA      No Normal Respiratory Margo     Gram Stain Many (4+) WBCs per low power field      Rare (1+) Epithelial cells per low power field      Many (4+) Gram positive cocci in pairs and clusters    Susceptibility        Staphylococcus aureus, MRSA      KELSIE      Clindamycin Resistant      Linezolid Susceptible      Oxacillin Resistant      Tetracycline Susceptible      Trimethoprim + Sulfamethoxazole Susceptible      Vancomycin Susceptible                       Susceptibility Comments       Staphylococcus aureus, MRSA    This isolate is presumed to be clindamycin resistant based on detection of inducible clindamycin resistance.  Clindamycin may still be effective in some patients.                MRSA Screen, PCR (Inpatient) - Swab, Nares [[id number]]  (Abnormal) Collected: 01/02/25 1246    Lab Status: Final result Specimen: Swab from Nares Updated: 01/02/25 1504     MRSA PCR Positive    Narrative:      The negative predictive value of this diagnostic test is high and should only be used to consider de-escalating anti-MRSA therapy. A positive result may indicate colonization with MRSA and must be correlated clinically.            CT Abdomen Pelvis With Contrast    Result Date: 1/31/2025  CT ABDOMEN PELVIS W CONTRAST Date of Exam: 1/31/2025 6:02 PM EST Indication: rt flank pain. Comparison: CT abdomen pelvis without contrast 1/2/2025 Technique: Axial CT images were obtained of the abdomen and pelvis following the uneventful intravenous administration of 80 mL Isovue-300. Reconstructed coronal and sagittal images were also obtained. Automated exposure control and iterative construction methods were used. Findings: Lung bases are without consolidation. Cardiomegaly. No pericardial effusion or pleural effusion.  There is severe hepatomegaly with the liver measuring 30 cm in craniocaudal length. The spleen measures 18 cm in craniocaudal length. No discrete liver lesion. The gallbladder is absent. Adrenal glands are normal. The pancreas is without findings of pancreatitis. No common bile duct dilatation. The left kidney is absent. There is no abnormal soft tissue in the left nephrectomy bed. The right kidney enhances normally. Negative for hydroureteronephrosis. No right-sided perinephric inflammation or fluid collection. Bladder is without acute abnormality. Uterus and adnexa unremarkable. Negative for pneumoperitoneum. No bowel obstruction. Moderate amount of stool in the colon. Normal appendix. The portal vein, splenic vein, and superior mesenteric vein are patent. Abdominal aortic branch vasculature patent. No aggressive osseous lesion or acute fracture. Delayed images demonstrate normal contrast  excretion into the right renal collecting system with opacification of the right ureter to the level of the mid ureter. Distal ureter is incompletely opacified partially limiting complete assessment.     Impression: Impression: 1. No acute abnormality in abdomen or pelvis. 2. Severe hepatomegaly. 3. Splenomegaly. 4. Prior cholecystectomy and left nephrectomy. 5. Of note patient has over 90 abdominal CTs in our system raising concern for total cumulative radiation dose, which should be considered with future imaging orders. Electronically Signed: Myo Benavidez MD  1/31/2025 6:38 PM EST  Workstation ID: [id number]    SLP FEES - Fiberoptic Endo Eval Swallow    Result Date: 1/31/2025  This procedure was auto-finalized with no dictation required.     Results for orders placed during the hospital encounter of 01/02/25    Adult Transthoracic Echo Complete W/ Cont if Necessary Per Protocol    Interpretation Summary    Left ventricular systolic function is normal. Calculated left ventricular EF = 59.3% Left ventricular ejection fraction appears to be 61 - 65%.    Left ventricular wall thickness is consistent with borderline concentric hypertrophy.    Left ventricular diastolic function was normal.    Mild aortic valve stenosis is present.    Peak velocity of the flow distal to the aortic valve is 292 cm/s. Aortic valve mean pressure gradient is 20 mmHg.    Estimated right ventricular systolic pressure from tricuspid regurgitation is normal (<35 mmHg).      Current medications:  Scheduled Meds:amitriptyline, 25 mg, Oral, Nightly  amLODIPine, 10 mg, Oral, Daily  apixaban, 5 mg, Oral, Q12H  collagenase, 1 Application, Topical, Q24H  DULoxetine, 30 mg, Oral, Nightly  DULoxetine, 60 mg, Oral, Daily  gabapentin, 300 mg, Oral, Q8H  hydrOXYzine, 25 mg, Oral, Nightly  insulin glargine, 54 Units, Subcutaneous, Q12H  Insulin Lispro, 20 Units, Subcutaneous, TID With Meals  insulin lispro, 4-24 Units, Subcutaneous, 4x Daily AC & at  Bedtime  lactobacillus acidophilus, 1 capsule, Oral, Daily  lisinopril, 10 mg, Oral, Q24H  magnesium sulfate, 2 g, Intravenous, Q2H  metoclopramide, 10 mg, Oral, TID AC  metoprolol succinate XL, 50 mg, Oral, BID  Nutrisource fiber, 1 packet, Per G Tube, TID  nystatin, , Topical, Q12H  Psyllium, 1 packet, Oral, BID      Continuous Infusions:   PRN Meds:.  acetaminophen    dextrose    dextrose    glucagon (human recombinant)    HYDROcodone-acetaminophen    ipratropium    ipratropium-albuterol    loperamide    Magnesium Standard Dose Replacement - Follow Nurse / BPA Driven Protocol    meclizine    melatonin    midazolam    ondansetron    oxyCODONE    Polyvinyl Alcohol-Povidone PF    Potassium Replacement - Follow Nurse / BPA Driven Protocol    sodium chloride    sodium chloride    Assessment & Plan   Assessment & Plan     Active Hospital Problems    Diagnosis  POA    **Acute respiratory failure with hypercapnia [J96.02]  Yes    Sepsis [A41.9]  Yes    Type 2 diabetes mellitus with hyperglycemia [E11.65]  Yes    Hypothyroid [E03.9]  Yes    Factor 5 Leiden mutation, heterozygous [D68.51]  Yes    MARIAA (acute kidney injury) [N17.9]  Yes    Primary hypertension [I10]  Yes    PTSD (post-traumatic stress disorder) [F43.10]  Yes    History of DVT in adulthood [Z86.718]  Not Applicable      Resolved Hospital Problems   No resolved problems to display.        Brief Hospital Course to date:  Natalia Arzate is a 39 y.o. female with a history of HLD, Hypothyroidism, Afib, PE/DVT, Factor V Leiden mutation, and stroke who presented to [facility] with altered mental status. Labs there were significant for renal failure, hyperglycemia, and hypercapnic respiratory failure. Dx with  pneumonia and resp failure.  She was intubated and required the initiation of vasopressors. She was transferred to [facility] on 1/2/25 for ongoing care.    This patient's problems and plans were partially entered by my partner and updated as appropriate by me  02/01/25.      Acute hypoxic resp failure, resolved  Severe sepsis with end organ failure- renal failure and resp failure, resolved  MRSA PNA--s/p vanc/linezolid  ESBL Klebsiella PNA  S/p intubation, extubated from 1/1/2025 till 1/17/2025  Pt underwent bronch on 1/14 with significant mucus plugging, post CXR with improvement in L lung aeration.  S/P merrem, vancomycin and linezolid for ESBL pneumonia and MRSA pneumonia   Currently on room air--cont pulmonary toilet, mobilization  Planning rehab at [facility] 2/2    Gemella sanguinis Bacteremia  1 bottle Gemella sanguinis--unclear significance  S/p 10 days amp/unasyn.    Hypoosmolar hyponatremia, resolved  Secondary to ongoing diuretic therapy with Bumex and diarrhea   improved with holding diuretics and after her diarrhea improved after switching to regular diet from Keofeed  Sodium improved and back to baseline    MARIAA due to severe sepsis  S/P Lt nephrectomy in 2022  Pt required CRRT ~ 1/8/25-1/11/25   Renal function improving, no need for further HD  Patient reported new R flank pain/ abdominal pain. Contrasted CT a/p completed 2/2 with h/o renal stones. CT negative for acute finding. Dilaudid IV discontinued and discussed with patient need to only utilize oral pain medication. Pain likely due to immobility/ stiffness/ positioning. Can try muscle relaxer prn    Severe dysphagia  Keofeed discontinued   Currently tolerating modified diet with thin thick liquid after FEES    Right arm pain, improved  X-rays to forearm and humerus neg for fracture  Doppler ultrasound right upper extremity with no evidence of DVT  As needed analgesia    Diarrhea, improved  Most likely secondary to tube feeds.  Now resolved after patient started eating by mouth.  Prior C diff neg  Continue probiotic, as needed Imodium and cholestyramine    Factor V Leiden mutation  History of PE/DVT  History of stroke  Continue eliquis    DM  A1c 9.1%  Continue insulin Lantus- increase 54units bid, Premeal  insulin and SSI    Hypertension  History A-fib  Continue metoprolol, norvasc. Restart home lisinopril  Eliquis    History of PTSD  Continue home cymbalta and elavil    Expected Discharge Location and Transportation: Acute rehab 2/2  Expected Discharge   Expected Discharge Date: 2/3/2025; Expected Discharge Time:      VTE Prophylaxis:  Pharmacologic & mechanical VTE prophylaxis orders are present.         AM-PAC 6 Clicks Score (PT): 15 (01/31/25 0830)    CODE STATUS:   Code Status and Medical Interventions: CPR (Attempt to Resuscitate); Full Support   Ordered at: 01/02/25 1952     Code Status (Patient has no pulse and is not breathing):    CPR (Attempt to Resuscitate)     Medical Interventions (Patient has pulse or is breathing):    Full Support       Natalia White, APRN  02/01/25

## 2025-02-01 NOTE — PLAN OF CARE
Goal Outcome Evaluation:  Plan of Care Reviewed With: patient           Outcome Evaluation: L flank wound dressing changed and PT was able to debride a moderate amount of slough from the wound base. PT recovered wound with santyl to help further soften slough to allow for further debridement. PT will f/u with dressing management in 2-3 days.

## 2025-02-02 LAB
GLUCOSE BLDC GLUCOMTR-MCNC: 236 MG/DL (ref 70–130)
GLUCOSE BLDC GLUCOMTR-MCNC: 280 MG/DL (ref 70–130)
GLUCOSE BLDC GLUCOMTR-MCNC: 355 MG/DL (ref 70–130)
GLUCOSE BLDC GLUCOMTR-MCNC: 379 MG/DL (ref 70–130)
MAGNESIUM SERPL-MCNC: 1.5 MG/DL (ref 1.6–2.6)

## 2025-02-02 PROCEDURE — 63710000001 INSULIN GLARGINE PER 5 UNITS: Performed by: NURSE PRACTITIONER

## 2025-02-02 PROCEDURE — 63710000001 INSULIN GLARGINE PER 5 UNITS: Performed by: INTERNAL MEDICINE

## 2025-02-02 PROCEDURE — 83735 ASSAY OF MAGNESIUM: CPT | Performed by: INTERNAL MEDICINE

## 2025-02-02 PROCEDURE — 63710000001 INSULIN LISPRO (HUMAN) PER 5 UNITS: Performed by: INTERNAL MEDICINE

## 2025-02-02 PROCEDURE — 82948 REAGENT STRIP/BLOOD GLUCOSE: CPT

## 2025-02-02 PROCEDURE — 25010000002 MAGNESIUM SULFATE 2 GM/50ML SOLUTION: Performed by: INTERNAL MEDICINE

## 2025-02-02 PROCEDURE — 63710000001 INSULIN LISPRO (HUMAN) PER 5 UNITS

## 2025-02-02 PROCEDURE — 99232 SBSQ HOSP IP/OBS MODERATE 35: CPT | Performed by: INTERNAL MEDICINE

## 2025-02-02 RX ORDER — INSULIN LISPRO 100 [IU]/ML
28 INJECTION, SOLUTION INTRAVENOUS; SUBCUTANEOUS
Status: DISCONTINUED | OUTPATIENT
Start: 2025-02-02 | End: 2025-02-03 | Stop reason: HOSPADM

## 2025-02-02 RX ORDER — HYDROMORPHONE HYDROCHLORIDE 2 MG/1
2 TABLET ORAL EVERY 6 HOURS PRN
Status: DISPENSED | OUTPATIENT
Start: 2025-02-02 | End: 2025-02-03

## 2025-02-02 RX ORDER — MAGNESIUM SULFATE HEPTAHYDRATE 40 MG/ML
2 INJECTION, SOLUTION INTRAVENOUS
Status: COMPLETED | OUTPATIENT
Start: 2025-02-02 | End: 2025-02-02

## 2025-02-02 RX ADMIN — HYDROMORPHONE HYDROCHLORIDE 2 MG: 2 TABLET ORAL at 20:46

## 2025-02-02 RX ADMIN — INSULIN GLARGINE 54 UNITS: 100 INJECTION, SOLUTION SUBCUTANEOUS at 08:55

## 2025-02-02 RX ADMIN — GABAPENTIN 300 MG: 300 CAPSULE ORAL at 13:01

## 2025-02-02 RX ADMIN — AMITRIPTYLINE HYDROCHLORIDE 25 MG: 25 TABLET, FILM COATED ORAL at 20:46

## 2025-02-02 RX ADMIN — COLLAGENASE SANTYL 1 APPLICATION: 250 OINTMENT TOPICAL at 08:58

## 2025-02-02 RX ADMIN — OXYCODONE HYDROCHLORIDE 10 MG: 10 TABLET ORAL at 05:36

## 2025-02-02 RX ADMIN — DULOXETINE HYDROCHLORIDE 30 MG: 30 CAPSULE, DELAYED RELEASE ORAL at 20:47

## 2025-02-02 RX ADMIN — AMLODIPINE BESYLATE 10 MG: 10 TABLET ORAL at 08:45

## 2025-02-02 RX ADMIN — NYSTATIN: 100000 POWDER TOPICAL at 20:46

## 2025-02-02 RX ADMIN — METOCLOPRAMIDE 10 MG: 10 TABLET ORAL at 08:52

## 2025-02-02 RX ADMIN — MAGNESIUM SULFATE HEPTAHYDRATE 2 G: 40 INJECTION, SOLUTION INTRAVENOUS at 15:25

## 2025-02-02 RX ADMIN — INSULIN GLARGINE 55 UNITS: 100 INJECTION, SOLUTION SUBCUTANEOUS at 20:44

## 2025-02-02 RX ADMIN — APIXABAN 5 MG: 5 TABLET, FILM COATED ORAL at 20:47

## 2025-02-02 RX ADMIN — INSULIN LISPRO 8 UNITS: 100 INJECTION, SOLUTION INTRAVENOUS; SUBCUTANEOUS at 17:41

## 2025-02-02 RX ADMIN — Medication 1 CAPSULE: at 08:52

## 2025-02-02 RX ADMIN — INSULIN LISPRO 12 UNITS: 100 INJECTION, SOLUTION INTRAVENOUS; SUBCUTANEOUS at 20:45

## 2025-02-02 RX ADMIN — GABAPENTIN 300 MG: 300 CAPSULE ORAL at 20:47

## 2025-02-02 RX ADMIN — OXYCODONE HYDROCHLORIDE 10 MG: 10 TABLET ORAL at 01:06

## 2025-02-02 RX ADMIN — APIXABAN 5 MG: 5 TABLET, FILM COATED ORAL at 08:52

## 2025-02-02 RX ADMIN — HYDROMORPHONE HYDROCHLORIDE 2 MG: 2 TABLET ORAL at 14:02

## 2025-02-02 RX ADMIN — HYDROXYZINE HYDROCHLORIDE 25 MG: 25 TABLET ORAL at 20:46

## 2025-02-02 RX ADMIN — ACETAMINOPHEN 1000 MG: 650 SOLUTION ORAL at 15:25

## 2025-02-02 RX ADMIN — GABAPENTIN 300 MG: 300 CAPSULE ORAL at 05:37

## 2025-02-02 RX ADMIN — INSULIN LISPRO 28 UNITS: 100 INJECTION, SOLUTION INTRAVENOUS; SUBCUTANEOUS at 17:41

## 2025-02-02 RX ADMIN — ACETAMINOPHEN 1000 MG: 650 SOLUTION ORAL at 01:06

## 2025-02-02 RX ADMIN — NYSTATIN: 100000 POWDER TOPICAL at 08:52

## 2025-02-02 RX ADMIN — METOPROLOL SUCCINATE 50 MG: 50 TABLET, EXTENDED RELEASE ORAL at 20:47

## 2025-02-02 RX ADMIN — METOCLOPRAMIDE 10 MG: 10 TABLET ORAL at 16:46

## 2025-02-02 RX ADMIN — INSULIN LISPRO 20 UNITS: 100 INJECTION, SOLUTION INTRAVENOUS; SUBCUTANEOUS at 13:01

## 2025-02-02 RX ADMIN — INSULIN LISPRO 20 UNITS: 100 INJECTION, SOLUTION INTRAVENOUS; SUBCUTANEOUS at 08:55

## 2025-02-02 RX ADMIN — MAGNESIUM SULFATE HEPTAHYDRATE 2 G: 40 INJECTION, SOLUTION INTRAVENOUS at 08:57

## 2025-02-02 RX ADMIN — MAGNESIUM SULFATE HEPTAHYDRATE 2 G: 40 INJECTION, SOLUTION INTRAVENOUS at 12:20

## 2025-02-02 RX ADMIN — METOCLOPRAMIDE 10 MG: 10 TABLET ORAL at 05:36

## 2025-02-02 RX ADMIN — METOPROLOL SUCCINATE 50 MG: 50 TABLET, EXTENDED RELEASE ORAL at 08:52

## 2025-02-02 RX ADMIN — DULOXETINE HYDROCHLORIDE 60 MG: 60 CAPSULE, DELAYED RELEASE ORAL at 08:52

## 2025-02-02 RX ADMIN — Medication 5 MG: at 01:06

## 2025-02-02 RX ADMIN — OXYCODONE HYDROCHLORIDE 10 MG: 10 TABLET ORAL at 16:46

## 2025-02-02 RX ADMIN — LISINOPRIL 10 MG: 10 TABLET ORAL at 08:52

## 2025-02-02 RX ADMIN — ACETAMINOPHEN 1000 MG: 650 SOLUTION ORAL at 08:48

## 2025-02-02 RX ADMIN — OXYCODONE HYDROCHLORIDE 10 MG: 10 TABLET ORAL at 10:01

## 2025-02-02 NOTE — PROGRESS NOTES
[facility] Medicine Services  PROGRESS NOTE    Patient Name: Natalia Arzate  : 1985  MRN: [id number]    Date of Admission: 2025  Primary Care Physician: Bladimir Calle PA-C    Subjective   Subjective     CC:  Resp failure    HPI:  Patient seen and examined this morning.  Still complaining of mild right lateral flank pain.   otherwise no acute complaints    Objective   Objective     Vital Signs:   Temp:  [97 °F (36.1 °C)-98.4 °F (36.9 °C)] 97.6°F (36.9 °C)  Heart Rate:  [] 91  Resp:  [18] 18  BP: (127-132)/() 127/81     Physical Exam:  General: morbidly obese , not in distress, conversant and cooperative  Head: Atraumatic and normocephalic  Eyes: No Icterus. No pallor  Ears:  Ears appear intact with no abnormalities noted  Throat: No oral lesions, no thrush  Neck: Supple, trachea midline  Lungs: Clear to auscultation bilaterally, equal air entry, no wheezing or crackles  Heart:  Normal S1 and S2, no murmur, no gallop, No JVD, 2+ lower extremity swelling  Abdomen:  Soft, no tenderness, no organomegaly, normal bowel sounds, no organomegaly  Extremities: pulses equal bilaterally  Skin: No bleeding, bruising or rash, normal skin turgor and elasticity  Neurologic: Cranial nerves appear intact with no evidence of facial asymmetry, normal motor and sensory functions in all 4 extremities  Psych: Alert and oriented x 3, normal mood       Results Reviewed:  LAB RESULTS:      Lab 25  0446 25  0342 25  1603 25  0554   WBC 5.22 3.94  --  5.58   HEMOGLOBIN 8.6* 8.4*  --  9.0*   HEMATOCRIT 27.4* 26.0*  --  27.6*   PLATELETS 214 162  --  167   NEUTROS ABS 2.60 1.80  --  3.18   IMMATURE GRANS (ABS) 0.02 0.01  --  0.02   LYMPHS ABS 1.54 1.27  --  1.47   MONOS ABS 0.51 0.56  --  0.52   EOS ABS 0.49* 0.27  --  0.33   MCV 94.8 93.2  --  90.2   HEPARIN ANTI-XA  --   --  0.90  --          Lab 25  0441 25  0446 25  0342 25  0554    SODIUM  --  133* 130* 131*   POTASSIUM  --  5.0 4.5 4.7   CHLORIDE  --  97* 93* 91*   CO2  --  23.0 21.0* 24.0   ANION GAP  --  13.0 16.0* 16.0*   BUN  --  28* 45* 36*   CREATININE  --  0.63 0.95 0.63   EGFR  --  116.6 78.8 116.6   GLUCOSE  --  232* 306* 235*   CALCIUM  --  10.1 10.1 10.8*   MAGNESIUM 1.5* 1.2* 1.1* 1.6         Lab 02/01/25  0446 01/30/25  0342   TOTAL PROTEIN 7.8 7.7   ALBUMIN 4.2 4.1   GLOBULIN 3.6 3.6   ALT (SGPT) 41* 35*   AST (SGOT) 55* 60*   BILIRUBIN 0.3 0.4   ALK PHOS 157* 136*                             Brief Urine Lab Results  (Last result in the past 365 days)        Color   Clarity   Blood   Leuk Est   Nitrite   Protein   CREAT   Urine HCG        02/01/25 1711 Yellow   Clear   Negative   Negative   Negative   Trace                   Microbiology Results Abnormal       Procedure Component Value - Date/Time    Respiratory Culture - Wash, Lung, Left Lower Lobe [[id number]]  (Abnormal)  (Susceptibility) Collected: 01/14/25 1608    Lab Status: Final result Specimen: Wash from Lung, Left Lower Lobe Updated: 01/17/25 1221     Respiratory Culture Light growth (2+) Klebsiella pneumoniae ESBL     Comment:   Consider infectious disease consult.  Susceptibility results may not correlate to clinical outcomes.         Light growth (2+) Staphylococcus aureus, MRSA     Comment:   Considering site of infection and appropriate dosing, oxacillin (or cefoxitin) results can be applied to cefazolin, nafcillin, ampicillin/sulbactam, dicloxacillin, amoxicillin/clavulanate, cephalexin, and cefpodoxime.  Methicillin resistant Staphylococcus aureus, Patient may be an isolation risk.         Rare growth Normal Respiratory Margo     Gram Stain Many (4+) WBCs per low power field      Rare (1+) Epithelial cells per low power field      Rare (1+) Gram positive cocci in pairs and clusters    Susceptibility        Klebsiella pneumoniae ESBL      KELSIE      Ciprofloxacin Resistant      Ertapenem Susceptible       Levofloxacin Resistant      Meropenem Susceptible      Tetracycline Resistant      Trimethoprim + Sulfamethoxazole Resistant                       Susceptibility        Staphylococcus aureus, MRSA      KELSIE      Clindamycin Resistant      Linezolid Susceptible      Oxacillin Resistant      Tetracycline Susceptible      Trimethoprim + Sulfamethoxazole Susceptible      Vancomycin Susceptible                       Susceptibility Comments       Klebsiella pneumoniae ESBL    With the exception of urinary-sourced infections, aminoglycosides should not be used as monotherapy.      Staphylococcus aureus, MRSA    This isolate is presumed to be clindamycin resistant based on detection of inducible clindamycin resistance.  Clindamycin may still be effective in some patients.  This isolate is presumed to be clindamycin resistant based on detection of inducible clindamycin resistance.  Clindamycin may still be effective in some patients.               Respiratory Culture - Sputum, ET Suction [[id number]]  (Abnormal) Collected: 01/14/25 2464    Lab Status: Final result Specimen: Sputum from ET Suction Updated: 01/17/25 1221     Respiratory Culture Light growth (2+) Klebsiella pneumoniae ESBL     Comment:   Consider infectious disease consult.  Susceptibility results may not correlate to clinical outcomes.         Light growth (2+) Staphylococcus aureus, MRSA     Comment:   Considering site of infection and appropriate dosing, oxacillin (or cefoxitin) results can be applied to cefazolin, nafcillin, ampicillin/sulbactam, dicloxacillin, amoxicillin/clavulanate, cephalexin, and cefpodoxime.  Methicillin resistant Staphylococcus aureus, Patient may be an isolation risk.        Gram Stain Many (4+) WBCs per low power field      Rare (1+) Epithelial cells per low power field      Few (2+) Gram positive cocci in pairs, chains and clusters    Narrative:      Refer to LLL Wash culture for MICS.     Blood Culture - Blood, Blood, Arterial Line  [[id number]]  (Abnormal) Collected: 01/14/25 1434    Lab Status: Final result Specimen: Blood, Arterial Line Updated: 01/17/25 0636     Blood Culture Staphylococcus, coagulase negative     Isolated from Anaerobic Bottle     Gram Stain Anaerobic Bottle Gram positive cocci in pairs and clusters    Narrative:      Probable contaminant requires clinical correlation, susceptibility not performed unless requested by physician.      Blood Culture ID, PCR - Blood, Blood, Arterial Line [[id number]]  (Abnormal) Collected: 01/14/25 1434    Lab Status: Final result Specimen: Blood, Arterial Line Updated: 01/16/25 0657     BCID, PCR Staph spp, not aureus or lugdunensis. Identification by BCID2 PCR.     BOTTLE TYPE Anaerobic Bottle    Blood Culture - Blood, Arm, Left [[id number]]  (Abnormal) Collected: 01/05/25 1305    Lab Status: Final result Specimen: Blood from Arm, Left Updated: 01/07/25 1100     Blood Culture Staphylococcus, coagulase negative     Isolated from Anaerobic Bottle     Gram Stain Anaerobic Bottle Gram positive cocci in clusters    Narrative:      Less than seven (7) mL's of blood was collected.  Insufficient quantity may yield false negative results.  Probable contaminant requires clinical correlation, susceptibility not performed unless requested by physician.    Blood Culture ID, PCR - Blood, Arm, Left [[id number]]  (Abnormal) Collected: 01/05/25 1305    Lab Status: Final result Specimen: Blood from Arm, Left Updated: 01/06/25 1435     BCID, PCR Staph spp, not aureus or lugdunensis. Identification by BCID2 PCR.     BOTTLE TYPE Anaerobic Bottle    Respiratory Culture - Bronchial Wash, Lung, Left Lower Lobe [[id number]]  (Abnormal)  (Susceptibility) Collected: 01/04/25 0755    Lab Status: Final result Specimen: Bronchial Wash from Lung, Left Lower Lobe Updated: 01/06/25 0908     Respiratory Culture Scant growth (1+) Staphylococcus aureus, MRSA     Comment:   Methicillin resistant Staphylococcus aureus, Patient may be  an isolation risk.         No Normal Respiratory Margo     Gram Stain Moderate (3+) WBCs seen      Rare (1+) Gram positive cocci in pairs and clusters    Susceptibility        Staphylococcus aureus, MRSA      KELSIE      Clindamycin Resistant      Linezolid Susceptible      Oxacillin Resistant      Tetracycline Susceptible      Trimethoprim + Sulfamethoxazole Susceptible      Vancomycin Susceptible                       Susceptibility Comments       Staphylococcus aureus, MRSA    This isolate is presumed to be clindamycin resistant based on detection of inducible clindamycin resistance.  Clindamycin may still be effective in some patients.  This isolate is presumed to be clindamycin resistant based on detection of inducible clindamycin resistance.  Clindamycin may still be effective in some patients.               Respiratory Culture - Sputum, ET Suction [[id number]]  (Abnormal)  (Susceptibility) Collected: 01/02/25 1212    Lab Status: Final result Specimen: Sputum from ET Suction Updated: 01/04/25 0939     Respiratory Culture Moderate growth (3+) Staphylococcus aureus, MRSA      No Normal Respiratory Margo     Gram Stain Many (4+) WBCs per low power field      Rare (1+) Epithelial cells per low power field      Many (4+) Gram positive cocci in pairs and clusters    Susceptibility        Staphylococcus aureus, MRSA      KELSIE      Clindamycin Resistant      Linezolid Susceptible      Oxacillin Resistant      Tetracycline Susceptible      Trimethoprim + Sulfamethoxazole Susceptible      Vancomycin Susceptible                       Susceptibility Comments       Staphylococcus aureus, MRSA    This isolate is presumed to be clindamycin resistant based on detection of inducible clindamycin resistance.  Clindamycin may still be effective in some patients.               MRSA Screen, PCR (Inpatient) - Swab, Nares [[id number]]  (Abnormal) Collected: 01/02/25 1246    Lab Status: Final result Specimen: Swab from Nares Updated:  01/02/25 1504     MRSA PCR Positive    Narrative:      The negative predictive value of this diagnostic test is high and should only be used to consider de-escalating anti-MRSA therapy. A positive result may indicate colonization with MRSA and must be correlated clinically.            CT Abdomen Pelvis With Contrast    Result Date: 1/31/2025  CT ABDOMEN PELVIS W CONTRAST Date of Exam: 1/31/2025 6:02 PM EST Indication: rt flank pain. Comparison: CT abdomen pelvis without contrast 1/2/2025 Technique: Axial CT images were obtained of the abdomen and pelvis following the uneventful intravenous administration of 80 mL Isovue-300. Reconstructed coronal and sagittal images were also obtained. Automated exposure control and iterative construction methods were used. Findings: Lung bases are without consolidation. Cardiomegaly. No pericardial effusion or pleural effusion.  There is severe hepatomegaly with the liver measuring 30 cm in craniocaudal length. The spleen measures 18 cm in craniocaudal length. No discrete liver lesion. The gallbladder is absent. Adrenal glands are normal. The pancreas is without findings of pancreatitis. No common bile duct dilatation. The left kidney is absent. There is no abnormal soft tissue in the left nephrectomy bed. The right kidney enhances normally. Negative for hydroureteronephrosis. No right-sided perinephric inflammation or fluid collection. Bladder is without acute abnormality. Uterus and adnexa unremarkable. Negative for pneumoperitoneum. No bowel obstruction. Moderate amount of stool in the colon. Normal appendix. The portal vein, splenic vein, and superior mesenteric vein are patent. Abdominal aortic branch vasculature patent. No aggressive osseous lesion or acute fracture. Delayed images demonstrate normal contrast excretion into the right renal collecting system with opacification of the right ureter to the level of the mid ureter. Distal ureter is incompletely opacified partially  limiting complete assessment.     Impression: Impression: 1. No acute abnormality in abdomen or pelvis. 2. Severe hepatomegaly. 3. Splenomegaly. 4. Prior cholecystectomy and left nephrectomy. 5. Of note patient has over 90 abdominal CTs in our system raising concern for total cumulative radiation dose, which should be considered with future imaging orders. Electronically Signed: Moy Benavidez MD  1/31/2025 6:38 PM EST  Workstation ID: [id number]     Results for orders placed during the hospital encounter of 01/02/25    Adult Transthoracic Echo Complete W/ Cont if Necessary Per Protocol    Interpretation Summary    Left ventricular systolic function is normal. Calculated left ventricular EF = 59.3% Left ventricular ejection fraction appears to be 61 - 65%.    Left ventricular wall thickness is consistent with borderline concentric hypertrophy.    Left ventricular diastolic function was normal.    Mild aortic valve stenosis is present.    Peak velocity of the flow distal to the aortic valve is 292 cm/s. Aortic valve mean pressure gradient is 20 mmHg.    Estimated right ventricular systolic pressure from tricuspid regurgitation is normal (<35 mmHg).      Current medications:  Scheduled Meds:acetaminophen, 1,000 mg, Oral, Q8H  amitriptyline, 25 mg, Oral, Nightly  amLODIPine, 10 mg, Oral, Daily  apixaban, 5 mg, Oral, Q12H  collagenase, 1 Application, Topical, Q24H  DULoxetine, 30 mg, Oral, Nightly  DULoxetine, 60 mg, Oral, Daily  gabapentin, 300 mg, Oral, Q8H  hydrOXYzine, 25 mg, Oral, Nightly  insulin glargine, 55 Units, Subcutaneous, Q12H  Insulin Lispro, 28 Units, Subcutaneous, TID With Meals  insulin lispro, 4-24 Units, Subcutaneous, 4x Daily AC & at Bedtime  lactobacillus acidophilus, 1 capsule, Oral, Daily  lisinopril, 10 mg, Oral, Q24H  magnesium sulfate, 2 g, Intravenous, Q2H  metoclopramide, 10 mg, Oral, TID AC  metoprolol succinate XL, 50 mg, Oral, BID  Nutrisource fiber, 1 packet, Per G Tube, TID  nystatin, ,  Topical, Q12H  Psyllium, 1 packet, Oral, BID      Continuous Infusions:   PRN Meds:.  dextrose    dextrose    glucagon (human recombinant)    HYDROmorphone    ipratropium    ipratropium-albuterol    loperamide    Magnesium Standard Dose Replacement - Follow Nurse / BPA Driven Protocol    meclizine    melatonin    methocarbamol    midazolam    ondansetron    oxyCODONE    Polyvinyl Alcohol-Povidone PF    Potassium Replacement - Follow Nurse / BPA Driven Protocol    sodium chloride    sodium chloride    Assessment & Plan   Assessment & Plan     Active Hospital Problems    Diagnosis  POA    **Acute respiratory failure with hypercapnia [J96.02]  Yes    Sepsis [A41.9]  Yes    Type 2 diabetes mellitus with hyperglycemia [E11.65]  Yes    Hypothyroid [E03.9]  Yes    Factor 5 Leiden mutation, heterozygous [D68.51]  Yes    MARIAA (acute kidney injury) [N17.9]  Yes    Primary hypertension [I10]  Yes    PTSD (post-traumatic stress disorder) [F43.10]  Yes    History of DVT in adulthood [Z86.718]  Not Applicable      Resolved Hospital Problems   No resolved problems to display.        Brief Hospital Course to date:  Natalia Arzate is a 39 y.o. female with a history of HLD, Hypothyroidism, Afib, PE/DVT, Factor V Leiden mutation, and stroke who presented to [facility] with altered mental status. Labs there were significant for renal failure, hyperglycemia, and hypercapnic respiratory failure. Dx with  pneumonia and resp failure.  She was intubated and required the initiation of vasopressors. She was transferred to [facility] on 1/2/25 for ongoing care.      Acute hypoxic resp failure, resolved  Severe sepsis with end organ failure- renal failure and resp failure, resolved  MRSA PNA--s/p vanc/linezolid  ESBL Klebsiella PNA  S/p intubation, extubated from 1/1/2025 till 1/17/2025  Pt underwent bronch on 1/14 with significant mucus plugging, post CXR with improvement in L lung aeration.  S/P merrem, vancomycin and linezolid for ESBL pneumonia and  MRSA pneumonia   Currently on room air--cont pulmonary toilet, mobilization  Planning rehab at [facility] 2/3/2025    Gemella sanguinis Bacteremia  1 bottle Gemella sanguinis--unclear significance  S/p 10 days amp/unasyn.    Hypoosmolar hyponatremia, resolved  Secondary to ongoing diuretic therapy with Bumex and diarrhea   improved with holding diuretics and after her diarrhea improved after switching to regular diet from Keofeed  Sodium improved and back to baseline    MARIAA due to severe sepsis  S/P Lt nephrectomy in 2022  Pt required CRRT ~ 1/8/25-1/11/25   Renal function improving, no need for further HD  Patient reported new R flank pain/ abdominal pain. Contrasted CT a/p completed 2/2 with h/o renal stones. CT negative for acute finding. Dilaudid IV discontinued and discussed with patient need to only utilize oral pain medication. Pain likely due to immobility/ stiffness/ positioning. Can try muscle relaxer prn    Severe dysphagia, improved  Keofeed discontinued   Currently tolerating modified diet with thin thick liquid after FEES    Right arm pain, improved  X-rays to forearm and humerus neg for fracture  Doppler ultrasound right upper extremity with no evidence of DVT  As needed analgesia    Diarrhea, improved  Most likely secondary to tube feeds.  Now resolved after patient started eating by mouth.  Prior C diff neg  Continue probiotic, as needed Imodium and cholestyramine    Factor V Leiden mutation  History of PE/DVT  History of stroke  Continue eliquis    DM  A1c 9.1%  Continue insulin Lantus 55 units twice daily  Continue Premeal insulin to 18 units 3 times daily with SSI    Hypertension  History A-fib  Continue metoprolol, norvasc. Restart home lisinopril  Eliquis    History of PTSD  Continue home cymbalta and elavil    Expected Discharge Location and Transportation: Acute rehab  Expected Discharge   Expected Discharge Date: 2/3/2025; Expected Discharge Time:      VTE Prophylaxis:  Pharmacologic & mechanical  VTE prophylaxis orders are present.         AM-PAC 6 Clicks Score (PT): 15 (01/31/25 0830)    CODE STATUS:   Code Status and Medical Interventions: CPR (Attempt to Resuscitate); Full Support   Ordered at: 01/02/25 1952     Code Status (Patient has no pulse and is not breathing):    CPR (Attempt to Resuscitate)     Medical Interventions (Patient has pulse or is breathing):    Full Support       Soo Casas MD  02/02/25

## 2025-02-03 VITALS
RESPIRATION RATE: 18 BRPM | DIASTOLIC BLOOD PRESSURE: 82 MMHG | HEART RATE: 83 BPM | OXYGEN SATURATION: 93 % | HEIGHT: 64 IN | SYSTOLIC BLOOD PRESSURE: 136 MMHG | WEIGHT: 293 LBS | BODY MASS INDEX: 50.02 KG/M2 | TEMPERATURE: 98 F

## 2025-02-03 PROBLEM — E87.1 HYPONATREMIA: Status: ACTIVE | Noted: 2025-02-03

## 2025-02-03 PROBLEM — I48.0 PAROXYSMAL ATRIAL FIBRILLATION: Status: ACTIVE | Noted: 2025-02-03

## 2025-02-03 PROBLEM — J96.01 ACUTE RESPIRATORY FAILURE WITH HYPOXIA AND HYPERCAPNIA: Status: ACTIVE | Noted: 2025-01-02

## 2025-02-03 PROBLEM — R65.20 SEVERE SEPSIS: Status: ACTIVE | Noted: 2025-01-02

## 2025-02-03 PROBLEM — Z90.5 HISTORY OF LEFT NEPHRECTOMY: Status: ACTIVE | Noted: 2025-02-03

## 2025-02-03 PROBLEM — R13.10 DYSPHAGIA: Status: ACTIVE | Noted: 2025-02-03

## 2025-02-03 PROBLEM — J15.212 MRSA PNEUMONIA: Status: ACTIVE | Noted: 2025-02-03

## 2025-02-03 PROBLEM — Z16.12 INFECTION DUE TO ESBL-PRODUCING KLEBSIELLA PNEUMONIAE: Status: ACTIVE | Noted: 2025-02-03

## 2025-02-03 PROBLEM — A49.8 INFECTION DUE TO ESBL-PRODUCING KLEBSIELLA PNEUMONIAE: Status: ACTIVE | Noted: 2025-02-03

## 2025-02-03 LAB
GLUCOSE BLDC GLUCOMTR-MCNC: 263 MG/DL (ref 70–130)
GLUCOSE BLDC GLUCOMTR-MCNC: 298 MG/DL (ref 70–130)
MAGNESIUM SERPL-MCNC: 1.7 MG/DL (ref 1.6–2.6)

## 2025-02-03 PROCEDURE — 63710000001 INSULIN LISPRO (HUMAN) PER 5 UNITS: Performed by: INTERNAL MEDICINE

## 2025-02-03 PROCEDURE — 63710000001 INSULIN GLARGINE PER 5 UNITS: Performed by: INTERNAL MEDICINE

## 2025-02-03 PROCEDURE — 83735 ASSAY OF MAGNESIUM: CPT | Performed by: INTERNAL MEDICINE

## 2025-02-03 PROCEDURE — 99239 HOSP IP/OBS DSCHRG MGMT >30: CPT | Performed by: PHYSICIAN ASSISTANT

## 2025-02-03 PROCEDURE — 63710000001 INSULIN LISPRO (HUMAN) PER 5 UNITS

## 2025-02-03 PROCEDURE — 82948 REAGENT STRIP/BLOOD GLUCOSE: CPT

## 2025-02-03 RX ORDER — L.ACID,PARA/B.BIFIDUM/S.THERM 8B CELL
1 CAPSULE ORAL DAILY
Start: 2025-02-04

## 2025-02-03 RX ORDER — GABAPENTIN 300 MG/1
300 CAPSULE ORAL 3 TIMES DAILY
Start: 2025-02-03

## 2025-02-03 RX ORDER — OXYCODONE HYDROCHLORIDE 10 MG/1
10 TABLET ORAL EVERY 4 HOURS PRN
Start: 2025-02-03

## 2025-02-03 RX ORDER — ACETAMINOPHEN 325 MG/1
650 TABLET ORAL EVERY 6 HOURS PRN
Start: 2025-02-03

## 2025-02-03 RX ORDER — ROSUVASTATIN CALCIUM 20 MG/1
40 TABLET, COATED ORAL NIGHTLY
Start: 2025-02-03

## 2025-02-03 RX ORDER — NYSTATIN 100000 [USP'U]/G
POWDER TOPICAL 2 TIMES DAILY
Start: 2025-02-03

## 2025-02-03 RX ORDER — PANTOPRAZOLE SODIUM 40 MG/1
40 TABLET, DELAYED RELEASE ORAL
Start: 2025-02-03

## 2025-02-03 RX ORDER — ENOXAPARIN SODIUM 100 MG/ML
100 INJECTION SUBCUTANEOUS EVERY 12 HOURS SCHEDULED
Start: 2025-02-03

## 2025-02-03 RX ORDER — HYDROXYZINE HYDROCHLORIDE 25 MG/1
25 TABLET, FILM COATED ORAL NIGHTLY
Start: 2025-02-03

## 2025-02-03 RX ORDER — HYDROMORPHONE HYDROCHLORIDE 2 MG/1
2 TABLET ORAL EVERY 6 HOURS PRN
Start: 2025-02-03

## 2025-02-03 RX ADMIN — OXYCODONE HYDROCHLORIDE 10 MG: 10 TABLET ORAL at 05:49

## 2025-02-03 RX ADMIN — INSULIN GLARGINE 55 UNITS: 100 INJECTION, SOLUTION SUBCUTANEOUS at 08:46

## 2025-02-03 RX ADMIN — METOCLOPRAMIDE 10 MG: 10 TABLET ORAL at 08:46

## 2025-02-03 RX ADMIN — INSULIN LISPRO 28 UNITS: 100 INJECTION, SOLUTION INTRAVENOUS; SUBCUTANEOUS at 13:06

## 2025-02-03 RX ADMIN — ACETAMINOPHEN 1000 MG: 650 SOLUTION ORAL at 08:46

## 2025-02-03 RX ADMIN — OXYCODONE HYDROCHLORIDE 10 MG: 10 TABLET ORAL at 00:20

## 2025-02-03 RX ADMIN — Medication 1 CAPSULE: at 08:45

## 2025-02-03 RX ADMIN — METOPROLOL SUCCINATE 50 MG: 50 TABLET, EXTENDED RELEASE ORAL at 08:45

## 2025-02-03 RX ADMIN — HYDROMORPHONE HYDROCHLORIDE 2 MG: 2 TABLET ORAL at 13:14

## 2025-02-03 RX ADMIN — NYSTATIN: 100000 POWDER TOPICAL at 09:00

## 2025-02-03 RX ADMIN — GABAPENTIN 300 MG: 300 CAPSULE ORAL at 05:34

## 2025-02-03 RX ADMIN — METOCLOPRAMIDE 10 MG: 10 TABLET ORAL at 13:07

## 2025-02-03 RX ADMIN — AMLODIPINE BESYLATE 10 MG: 10 TABLET ORAL at 08:45

## 2025-02-03 RX ADMIN — INSULIN LISPRO 12 UNITS: 100 INJECTION, SOLUTION INTRAVENOUS; SUBCUTANEOUS at 13:07

## 2025-02-03 RX ADMIN — APIXABAN 5 MG: 5 TABLET, FILM COATED ORAL at 08:45

## 2025-02-03 RX ADMIN — HYDROMORPHONE HYDROCHLORIDE 2 MG: 2 TABLET ORAL at 08:45

## 2025-02-03 RX ADMIN — COLLAGENASE SANTYL 1 APPLICATION: 250 OINTMENT TOPICAL at 09:00

## 2025-02-03 RX ADMIN — INSULIN LISPRO 12 UNITS: 100 INJECTION, SOLUTION INTRAVENOUS; SUBCUTANEOUS at 08:46

## 2025-02-03 RX ADMIN — DULOXETINE HYDROCHLORIDE 60 MG: 60 CAPSULE, DELAYED RELEASE ORAL at 08:45

## 2025-02-03 RX ADMIN — LISINOPRIL 10 MG: 10 TABLET ORAL at 08:45

## 2025-02-03 RX ADMIN — HYDROMORPHONE HYDROCHLORIDE 2 MG: 2 TABLET ORAL at 02:49

## 2025-02-03 RX ADMIN — OXYCODONE HYDROCHLORIDE 10 MG: 10 TABLET ORAL at 11:31

## 2025-02-03 RX ADMIN — INSULIN LISPRO 28 UNITS: 100 INJECTION, SOLUTION INTRAVENOUS; SUBCUTANEOUS at 08:46

## 2025-02-03 NOTE — DISCHARGE SUMMARY
[facility] Medicine Services  DISCHARGE SUMMARY    Patient Name: Natalia Arzate  : 1985  MRN: [id number]    Date of Admission: 2025 11:36 AM  Date of Discharge:  2/3/2025  Primary Care Physician: Bladimir Calle PA-C    Consults       Date and Time Order Name Status Description    2025 10:01 AM Inpatient Infectious Diseases Consult Completed     2025  8:11 AM Inpatient Nephrology Consult Completed     2025 11:42 AM Inpatient Nephrology Consult Completed           Hospital Course     Active Hospital Problems    Diagnosis  POA    **Acute respiratory failure with hypoxia and hypercapnia [J96.01, J96.02]  Yes    MRSA pneumonia [J15.212]  Yes    Infection due to ESBL-producing Klebsiella pneumoniae [A49.8, Z16.12]  Yes    Hyponatremia [E87.1]  Yes    History of left nephrectomy [Z90.5]  Not Applicable    Dysphagia [R13.10]  No    Paroxysmal atrial fibrillation [I48.0]  Yes    Severe sepsis [A41.9, R65.20]  Yes    Diabetic peripheral neuropathy associated with type 2 diabetes mellitus [E11.42]  Yes    Type 2 diabetes mellitus with hyperglycemia [E11.65]  Yes    Hypothyroid [E03.9]  Yes    Factor 5 Leiden mutation, heterozygous [D68.51]  Yes    Primary hypertension [I10]  Yes    PTSD (post-traumatic stress disorder) [F43.10]  Yes    Diabetes mellitus type 2, insulin dependent [E11.9, Z79.4]  Not Applicable    History of DVT in adulthood [Z86.718]  Not Applicable    History of pulmonary embolism [Z86.711]  Yes      Resolved Hospital Problems    Diagnosis Date Resolved POA    MARIAA (acute kidney injury) [N17.9] 2025 Yes     Hospital Course:  Ms. Natalia Arzate is a 39yoF with PMH significant for HTN, HLD, paroxysmal atrial fibrillation, prior CVA, factor V Leiden mutation with prior DVT/PT, insulin-dependent DMII with diabetic peripheral neuropathy, PTSD and morbid obesity (BMI 56). She presented to [facility] on 25 for evaluation of AMS. She  was found to have acute hypoxic/hypercapnic respiratory failure, MARIAA and hyperglycemia. Diagnosed with PNA. She required intubation and pressor support. She was transferred to [facility] ICU on 1/2/25 for higher level of care.     Acute hypoxic/hypercapnic respiratory failure, resolved   Severe sepsis with end organ failure (renal / respiratory failure), resolved  MRSA PNA  ESBL Klebsiella PNA  - s/p intubation 1/1-1/17/25   - s/p bronchoscopy on 1/14 with significant mucous plugging  - s/p IV Merrem, Vancomycin and Linezolid     Gemella sanguinis Bacteremia  - 1 of 2 bottles (+) bottle Gemella sanguinis -unclear significance  - Has completed 10 days of Ampicillin / Unasyn      Hypoosmolar hyponatremia, resolved  - Likely related to volume loss due to diuresis and diarrhea  - Improved once diuretics held and diarrhea slowed  - Sodium has returned to baseline     MARIAA due to severe sepsis  History of L nephrectomy (2022)  - Required CRRT from ~1/18-1/11/25  - Renal function has improved - no further need for dialysis     Severe dysphagia, improved  - Likely related to prolonged intubation - s/p Keofeed   - SLP followed - cleared for diet     Right arm pain, improved  - XR and duplex US reassuring     Diarrhea, improved  - Likely related to tube feeds. Resolved with resumption of PO diet  - C. Diff negative. OK for PRN Imodium     Factor V Leiden mutation  History of PE/DVT  History of stroke  - Resume home Lovenox, 0.7mg/kg BID      Insulin-dependent DMII  Diabetic peripheral neuropathy  - A1c 9.1% - at baseline, on U500 regular insulin - 200 units with breakfast, 150 units with lunch and 180 units with dinner. Unclear compliance  - During this admission, suboptimal glucose control (200-300's) on current regimen of Lantus 55 units BID, Lispro 28 units TID AC + high-dose SSI   - Continue Cymbalta / Gabapentin     Hypertension  History A-fib  - Continue Amlodipine, Lisinopril and Metoprolol  -  Anticoagulated with Lovenox as above     History of PTSD  - Continue Amitriptyline and Cymbalta     Day of Discharge     HPI:   In bed. Didn't get much sleep. Notes low back / flank / groin pain. Asks that pain meds not be changed upon transfer to [facility]. I encouraged her to work her way off of the opiates while at [facility]. Denies chest pain, dyspnea, abdominal pain or diarrhea. Mild nausea, no vomiting.     Review of Systems  Gen- No fevers, chills  CV- No chest pain, palpitations  Resp- No cough, dyspnea  GI- No V/D, abd pain    Vital Signs:   Temp:  [98 °F (36.7 °C)-98.6 °F (37 °C)] 98 °F (36.7 °C)  Heart Rate:  [83] 83  Resp:  [18] 18  BP: (123-138)/(77-84) 136/82    Physical Exam:  Constitutional: No acute distress, awake, alert and conversant. Laying in bed. Chronically ill appearing. Morbidly obese   HENT: NCAT, mucous membranes moist  Respiratory: Clear to auscultation bilaterally, respiratory effort normal   Cardiovascular: RRR  Gastrointestinal: Positive bowel sounds, soft, nontender, nondistended  Musculoskeletal: No bilateral ankle edema  Psychiatric: Appropriate affect, cooperative with exam  Neurologic: Oriented x 3, moves all extremities spontaneously without focal deficits, speech clear  Skin: No rashes to exposed surfaces     Pertinent  and/or Most Recent Results     LAB RESULTS:      Lab 02/01/25 0446 01/30/25 0342 01/28/25  1603   WBC 5.22 3.94  --    HEMOGLOBIN 8.6* 8.4*  --    HEMATOCRIT 27.4* 26.0*  --    PLATELETS 214 162  --    NEUTROS ABS 2.60 1.80  --    IMMATURE GRANS (ABS) 0.02 0.01  --    LYMPHS ABS 1.54 1.27  --    MONOS ABS 0.51 0.56  --    EOS ABS 0.49* 0.27  --    MCV 94.8 93.2  --    HEPARIN ANTI-XA  --   --  0.90         Lab 02/03/25 0446 02/02/25 0441 02/01/25 0446 01/30/25 0342   SODIUM  --   --  133* 130*   POTASSIUM  --   --  5.0 4.5   CHLORIDE  --   --  97* 93*   CO2  --   --  23.0 21.0*   ANION GAP  --   --  13.0 16.0*   BUN  --   --  28* 45*   CREATININE  --   --  0.63 0.95    EGFR  --   --  116.6 78.8   GLUCOSE  --   --  232* 306*   CALCIUM  --   --  10.1 10.1   MAGNESIUM 1.7 1.5* 1.2* 1.1*         Lab 02/01/25  0446 01/30/25  0342   TOTAL PROTEIN 7.8 7.7   ALBUMIN 4.2 4.1   GLOBULIN 3.6 3.6   ALT (SGPT) 41* 35*   AST (SGOT) 55* 60*   BILIRUBIN 0.3 0.4   ALK PHOS 157* 136*     Brief Urine Lab Results  (Last result in the past 365 days)        Color   Clarity   Blood   Leuk Est   Nitrite   Protein   CREAT   Urine HCG        02/01/25 1711 Yellow   Clear   Negative   Negative   Negative   Trace                 Microbiology Results (last 10 days)       ** No results found for the last 240 hours. **          CT Abdomen Pelvis With Contrast    Result Date: 1/31/2025  CT ABDOMEN PELVIS W CONTRAST Date of Exam: 1/31/2025 6:02 PM EST Indication: rt flank pain. Comparison: CT abdomen pelvis without contrast 1/2/2025 Technique: Axial CT images were obtained of the abdomen and pelvis following the uneventful intravenous administration of 80 mL Isovue-300. Reconstructed coronal and sagittal images were also obtained. Automated exposure control and iterative construction methods were used. Findings: Lung bases are without consolidation. Cardiomegaly. No pericardial effusion or pleural effusion.  There is severe hepatomegaly with the liver measuring 30 cm in craniocaudal length. The spleen measures 18 cm in craniocaudal length. No discrete liver lesion. The gallbladder is absent. Adrenal glands are normal. The pancreas is without findings of pancreatitis. No common bile duct dilatation. The left kidney is absent. There is no abnormal soft tissue in the left nephrectomy bed. The right kidney enhances normally. Negative for hydroureteronephrosis. No right-sided perinephric inflammation or fluid collection. Bladder is without acute abnormality. Uterus and adnexa unremarkable. Negative for pneumoperitoneum. No bowel obstruction. Moderate amount of stool in the colon. Normal appendix. The portal vein,  splenic vein, and superior mesenteric vein are patent. Abdominal aortic branch vasculature patent. No aggressive osseous lesion or acute fracture. Delayed images demonstrate normal contrast excretion into the right renal collecting system with opacification of the right ureter to the level of the mid ureter. Distal ureter is incompletely opacified partially limiting complete assessment.     Impression: 1. No acute abnormality in abdomen or pelvis. 2. Severe hepatomegaly. 3. Splenomegaly. 4. Prior cholecystectomy and left nephrectomy. 5. Of note patient has over 90 abdominal CTs in our system raising concern for total cumulative radiation dose, which should be considered with future imaging orders. Electronically Signed: Moy Benavidez MD  1/31/2025 6:38 PM EST  Workstation ID: [id number]    [facility] FEES - Fiberoptic Endo Eval Swallow    Result Date: 1/31/2025  This procedure was auto-finalized with no dictation required.    CT Upper Extremity Right With Contrast    Result Date: 1/27/2025  CT UPPER EXTREMITY RIGHT W CONTRAST Date of Exam: 1/27/2025 11:48 AM EST Indication: pain in R shoulder and R upper arm. Comparison: Humerus radiographs 1/23/2025 Technique: Axial CT images were obtained of the right upper extremity after the uneventful intravenous administration of 95 mL Isovue-300.  Reconstructed coronal and sagittal images were also obtained. Automated exposure control and iterative construction methods were used. Findings: Bones: Normal acromioclavicular and glenohumeral alignment. No evidence of fracture. No osseous erosions. No substantial degenerative changes. Soft tissues: No soft tissue mass or fluid collection. Visualized muscles appear intact. Partially visualized nasogastric tube. Central venous catheter partially visualized. Visualized lung is clear.     Impression: No evidence of acute osseous or soft tissue abnormality. Electronically Signed: Jacob Alvarado MD  1/27/2025 3:37 PM EST  Workstation ID:  [id number]    [facility] FEES - Fiberoptic Endo Eval Swallow    Result Date: 1/27/2025  This procedure was auto-finalized with no dictation required.    Duplex Venous Upper Extremity - Right CAR    Result Date: 1/26/2025    Normal right upper extremity venous duplex scan.     XR Chest 1 View    Result Date: 1/26/2025  XR CHEST 1 VW Date of Exam: 1/26/2025 5:55 AM EST Indication: chest pain Comparison: Chest radiograph January 19, 2025 Findings: There is a feeding tube below the diaphragm. There is a left internal jugular central line with the tip in the superior vena cava. Heart is enlarged. The pulmonary vascular markings are normal. There is a persistent opacity at the left base likely combination of pleural effusion and atelectasis.     Impression: Persistent left basilar opacity likely combination of pleural fluid and atelectasis. Electronically Signed: Spencer James MD  1/26/2025 6:14 AM EST  Workstation ID: [id number]    [facility] FEES - Fiberoptic Endo Eval Swallow    Result Date: 1/23/2025  This procedure was auto-finalized with no dictation required.     Results for orders placed during the hospital encounter of 01/02/25    Duplex Venous Upper Extremity - Right CAR    Interpretation Summary    Normal right upper extremity venous duplex scan.    Results for orders placed during the hospital encounter of 01/02/25    Adult Transthoracic Echo Complete W/ Cont if Necessary Per Protocol    Interpretation Summary    Left ventricular systolic function is normal. Calculated left ventricular EF = 59.3% Left ventricular ejection fraction appears to be 61 - 65%.    Left ventricular wall thickness is consistent with borderline concentric hypertrophy.    Left ventricular diastolic function was normal.    Mild aortic valve stenosis is present.    Peak velocity of the flow distal to the aortic valve is 292 cm/s. Aortic valve mean pressure gradient is 20 mmHg.    Estimated right ventricular systolic pressure from tricuspid regurgitation is  normal (<35 mmHg).    Discharge Details        Discharge Medications        New Medications        Instructions Start Date   acetaminophen 325 MG tablet  Commonly known as: Tylenol   650 mg, Oral, Every 6 Hours PRN      HYDROmorphone 2 MG tablet  Commonly known as: DILAUDID   2 mg, Oral, Every 6 Hours PRN      hydrOXYzine 25 MG tablet  Commonly known as: ATARAX   25 mg, Oral, Nightly      lactobacillus acidophilus capsule capsule   1 capsule, Oral, Daily   Start Date: February 4, 2025     oxyCODONE 10 MG tablet  Commonly known as: ROXICODONE   10 mg, Oral, Every 4 Hours PRN             Changes to Medications        Instructions Start Date   nystatin 230003 UNIT/GM powder  Commonly known as: MYCOSTATIN  What changed: when to take this   Topical, 2 Times Daily      pantoprazole 40 MG EC tablet  Commonly known as: PROTONIX  What changed: when to take this   40 mg, Oral, Every Morning Before Breakfast             Continue These Medications        Instructions Start Date   Albuterol-Budesonide 90-80 MCG/ACT aerosol   2 puffs, Every 4 Hours PRN      allopurinol 100 MG tablet  Commonly known as: ZYLOPRIM   100 mg, Oral, Daily      amitriptyline 25 MG tablet  Commonly known as: ELAVIL   1 tablet, Every Night at Bedtime      amLODIPine 10 MG tablet  Commonly known as: NORVASC   10 mg, Daily      cholecalciferol 1.25 MG (18868 UT) capsule  Commonly known as: VITAMIN D3   50,000 Units, Every 7 Days      clopidogrel 75 MG tablet  Commonly known as: PLAVIX   75 mg, Oral, Daily      DULoxetine 30 MG capsule  Commonly known as: CYMBALTA   60 mg, Daily      DULoxetine 30 MG capsule  Commonly known as: CYMBALTA   30 mg, Nightly      Enoxaparin Sodium 100 MG/ML solution prefilled syringe syringe  Commonly known as: LOVENOX   100 mg, Subcutaneous, Every 12 Hours Scheduled      fluticasone 50 MCG/ACT nasal spray  Commonly known as: FLONASE   2 sprays, Daily      gabapentin 300 MG capsule  Commonly known as: NEURONTIN   300 mg, Oral, 3  Times Daily      HumaLOG KwikPen 200 UNIT/ML solution pen-injector  Generic drug: Insulin Lispro   Subcutaneous, Inject 0-75 units per sliding scale subQ three times daily with meals      Insulin Regular Human (Conc) 500 UNIT/ML solution pen-injector CONCENTRATED injection  Commonly known as: HumuLIN R   Subcutaneous, 3 Times Daily Before Meals, Inject 200 units subQ with breakfast Inject 150 units subQ with lunch Inject 180 units subQ with dinner      ipratropium-albuterol 0.5-2.5 mg/3 ml nebulizer  Commonly known as: DUO-NEB   3 mL, Nebulization, Every 4 Hours PRN      lisinopril 10 MG tablet  Commonly known as: PRINIVIL,ZESTRIL   10 mg, Daily      metFORMIN 1000 MG tablet  Commonly known as: GLUCOPHAGE   1,000 mg, 2 Times Daily With Meals      metoprolol succinate XL 50 MG 24 hr tablet  Commonly known as: TOPROL-XL   50 mg, 2 Times Daily      mometasone-formoterol 200-5 MCG/ACT inhaler  Commonly known as: DULERA 200   2 puffs twice daily      montelukast 10 MG tablet  Commonly known as: SINGULAIR   10 mg, Oral, Nightly      multivitamin with minerals tablet tablet   1 tablet, Daily      rosuvastatin 20 MG tablet  Commonly known as: CRESTOR   40 mg, Oral, Nightly      vitamin B-12 1000 MCG tablet  Commonly known as: CYANOCOBALAMIN   1,000 mcg, Daily             Allergies   Allergen Reactions    Salvia Officinalis Itching, Other (See Comments) and Shortness Of Breath    Shellfish Allergy Anaphylaxis    Toradol [Ketorolac Tromethamine] Anaphylaxis and Hives    Tree Nut Anaphylaxis and Shortness Of Breath     WALNUTS    Bleach [Sodium Hypochlorite] Nausea Only     Per patient     Haldol [Haloperidol] Hives and Mental Status Change    Tramadol Hives and Swelling    Amoxicillin Hives and Rash     Tolerated ampicillin graded-dose challenge w/o complications (1/4/25).    Penicillins Hives and Rash     Tolerated ampicillin graded-dose challenge w/o complications (1/4/25).     Discharge Disposition:Skilled Nursing Facility  (DC - External)    Diet:  Diet Instructions       Diet: Regular/House Diet, Cardiac Diets, Diabetic Diets; Healthy Heart (2-3 Na+); Regular (IDDSI 7); Thin (IDDSI 0); Consistent Carbohydrate      Discharge Diet:  Regular/House Diet  Cardiac Diets  Diabetic Diets       Cardiac Diet: Healthy Heart (2-3 Na+)    Texture: Regular (IDDSI 7)    Fluid Consistency: Thin (IDDSI 0)    Diabetic Diet: Consistent Carbohydrate           Activity:  Activity Instructions       Activity as Tolerated      Up WIth Assist             CODE STATUS:    Code Status and Medical Interventions: CPR (Attempt to Resuscitate); Full Support   Ordered at: 01/02/25 1952     Code Status (Patient has no pulse and is not breathing):    CPR (Attempt to Resuscitate)     Medical Interventions (Patient has pulse or is breathing):    Full Support     Future Appointments   Date Time Provider Department Center   2/3/2025  2:00 PM MED 8 [facility]   2/13/2025 10:00 AM Milan King APRN [facility]   2/18/2025 10:00 AM [facility] NURSE [facility]   2/18/2025 10:30 AM Alison Constantino MD [facility]     Soraya Irizarry PA-C  02/03/25    Time Spent on Discharge:  I spent 40 minutes on this discharge activity which included: face-to-face encounter with the patient, reviewing the data in the system, coordination of the care with the nursing staff as well as consultants, documentation, and entering orders.

## 2025-02-03 NOTE — PAYOR COMM NOTE
"Mago Arzate Cassie \"Isa\" (39 y.o. Female)     [id number]     Alpa Walter, RN  Utilization Review  [phone]  [phone]        Date of Birth   1985    Social Security Number       Address   [address] [place] [address]    Home Phone   [phone]    MRN   [id number]       Church   [facility]    Marital Status                               Admission Date   1/2/25    Admission Type   Urgent    Admitting Provider   Soo Casas MD    Attending Provider   Soo Casas MD    Department, Room/Bed   [facility] 6A, N620/1       Discharge Date       Discharge Disposition   Skilled Nursing Facility (DC - External)    Discharge Destination                                 Attending Provider: Soo Casas MD    Allergies: Salvia Officinalis, Shellfish Allergy, Toradol [Ketorolac Tromethamine], Tree Nut, Bleach [Sodium Hypochlorite], Haldol [Haloperidol], Tramadol, Amoxicillin, Penicillins    Isolation: Contact   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), 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)   Code Status: CPR    Ht: 162.6 cm (64\")   Wt: 149 kg (328 lb 14.8 oz)    Admission Cmt: None   Principal Problem: Acute respiratory failure with hypoxia and hypercapnia [J96.01,J96.02]                   Active Insurance as of 1/2/2025       Primary Coverage       Payor Plan Insurance Group Employer/Plan Group    [facility]        Payor Plan Address Payor Plan Phone Number Payor Plan Fax Number Effective Dates    [address]   4/1/2016 - None Entered    [facility] [id number]         Subscriber Name Subscriber Birth Date Member ID       MAGO ARZATEYCE 1985 [id number]                     Emergency Contacts        (Rel.) Home Phone Work Phone Mobile Phone    Faina Arzate (Mother) [phone] -- --    Milan Arzate (Son) [phone] -- [phone]                 Discharge Summary        Marchik, " Soraya BUTT PA-C at 25 1119       Attestation signed by Soo Casas MD at 25 1318    I have reviewed this documentation and agree.                        [facility] Medicine Services  DISCHARGE SUMMARY    Patient Name: Natalia Arzate  : 1985  MRN: [id number]    Date of Admission: 2025 11:36 AM  Date of Discharge:  2/3/2025  Primary Care Physician: Bladimir Calle PA-C    Consults       Date and Time Order Name Status Description    2025 10:01 AM Inpatient Infectious Diseases Consult Completed     2025  8:11 AM Inpatient Nephrology Consult Completed     2025 11:42 AM Inpatient Nephrology Consult Completed           Hospital Course     Active Hospital Problems    Diagnosis  POA    **Acute respiratory failure with hypoxia and hypercapnia [J96.01, J96.02]  Yes    MRSA pneumonia [J15.212]  Yes    Infection due to ESBL-producing Klebsiella pneumoniae [A49.8, Z16.12]  Yes    Hyponatremia [E87.1]  Yes    History of left nephrectomy [Z90.5]  Not Applicable    Dysphagia [R13.10]  No    Paroxysmal atrial fibrillation [I48.0]  Yes    Severe sepsis [A41.9, R65.20]  Yes    Diabetic peripheral neuropathy associated with type 2 diabetes mellitus [E11.42]  Yes    Type 2 diabetes mellitus with hyperglycemia [E11.65]  Yes    Hypothyroid [E03.9]  Yes    Factor 5 Leiden mutation, heterozygous [D68.51]  Yes    Primary hypertension [I10]  Yes    PTSD (post-traumatic stress disorder) [F43.10]  Yes    Diabetes mellitus type 2, insulin dependent [E11.9, Z79.4]  Not Applicable    History of DVT in adulthood [Z86.718]  Not Applicable    History of pulmonary embolism [Z86.711]  Yes      Resolved Hospital Problems    Diagnosis Date Resolved POA    MARIAA (acute kidney injury) [N17.9] 2025 Yes     Hospital Course:  Ms. Natalia Arzate is a 39yoF with PMH significant for HTN, HLD, paroxysmal atrial fibrillation, prior CVA, factor V Leiden mutation with prior  DVT/PT, insulin-dependent DMII with diabetic peripheral neuropathy, PTSD and morbid obesity (BMI 56). She presented to [facility] on 1/1/25 for evaluation of AMS. She was found to have acute hypoxic/hypercapnic respiratory failure, MARIAA and hyperglycemia. Diagnosed with PNA. She required intubation and pressor support. She was transferred to [facility] ICU on 1/2/25 for higher level of care.     Acute hypoxic/hypercapnic respiratory failure, resolved   Severe sepsis with end organ failure (renal / respiratory failure), resolved  MRSA PNA  ESBL Klebsiella PNA  - s/p intubation 1/1-1/17/25   - s/p bronchoscopy on 1/14 with significant mucous plugging  - s/p IV Merrem, Vancomycin and Linezolid     Gemella sanguinis Bacteremia  - 1 of 2 bottles (+) bottle Gemella sanguinis -unclear significance  - Has completed 10 days of Ampicillin / Unasyn      Hypoosmolar hyponatremia, resolved  - Likely related to volume loss due to diuresis and diarrhea  - Improved once diuretics held and diarrhea slowed  - Sodium has returned to baseline     MARAIA due to severe sepsis  History of L nephrectomy (2022)  - Required CRRT from ~1/18-1/11/25  - Renal function has improved - no further need for dialysis     Severe dysphagia, improved  - Likely related to prolonged intubation - s/p Keofeed   - SLP followed - cleared for diet     Right arm pain, improved  - XR and duplex US reassuring     Diarrhea, improved  - Likely related to tube feeds. Resolved with resumption of PO diet  - C. Diff negative. OK for PRN Imodium     Factor V Leiden mutation  History of PE/DVT  History of stroke  - Resume home Lovenox, 0.7mg/kg BID      Insulin-dependent DMII  Diabetic peripheral neuropathy  - A1c 9.1% - at baseline, on U500 regular insulin - 200 units with breakfast, 150 units with lunch and 180 units with dinner. Unclear compliance  - During this admission, suboptimal glucose control (200-300's) on current regimen of Lantus 55  units BID, Lispro 28 units TID AC + high-dose SSI   - Continue Cymbalta / Gabapentin     Hypertension  History A-fib  - Continue Amlodipine, Lisinopril and Metoprolol  - Anticoagulated with Lovenox as above     History of PTSD  - Continue Amitriptyline and Cymbalta     Day of Discharge     HPI:   In bed. Didn't get much sleep. Notes low back / flank / groin pain. Asks that pain meds not be changed upon transfer to [facility]. I encouraged her to work her way off of the opiates while at [facility]. Denies chest pain, dyspnea, abdominal pain or diarrhea. Mild nausea, no vomiting.     Review of Systems  Gen- No fevers, chills  CV- No chest pain, palpitations  Resp- No cough, dyspnea  GI- No V/D, abd pain    Vital Signs:   Temp:  [98 °F (36.7 °C)-98.6 °F (37 °C)] 98 °F (36.7 °C)  Heart Rate:  [83] 83  Resp:  [18] 18  BP: (123-138)/(77-84) 136/82    Physical Exam:  Constitutional: No acute distress, awake, alert and conversant. Laying in bed. Chronically ill appearing. Morbidly obese   HENT: NCAT, mucous membranes moist  Respiratory: Clear to auscultation bilaterally, respiratory effort normal   Cardiovascular: RRR  Gastrointestinal: Positive bowel sounds, soft, nontender, nondistended  Musculoskeletal: No bilateral ankle edema  Psychiatric: Appropriate affect, cooperative with exam  Neurologic: Oriented x 3, moves all extremities spontaneously without focal deficits, speech clear  Skin: No rashes to exposed surfaces     Pertinent  and/or Most Recent Results     LAB RESULTS:      Lab 02/01/25  0446 01/30/25  0342 01/28/25  1603   WBC 5.22 3.94  --    HEMOGLOBIN 8.6* 8.4*  --    HEMATOCRIT 27.4* 26.0*  --    PLATELETS 214 162  --    NEUTROS ABS 2.60 1.80  --    IMMATURE GRANS (ABS) 0.02 0.01  --    LYMPHS ABS 1.54 1.27  --    MONOS ABS 0.51 0.56  --    EOS ABS 0.49* 0.27  --    MCV 94.8 93.2  --    HEPARIN ANTI-XA  --   --  0.90         Lab 02/03/25  0446 02/02/25  0441 02/01/25  0446 01/30/25  0342   SODIUM  --   --  133* 130*    POTASSIUM  --   --  5.0 4.5   CHLORIDE  --   --  97* 93*   CO2  --   --  23.0 21.0*   ANION GAP  --   --  13.0 16.0*   BUN  --   --  28* 45*   CREATININE  --   --  0.63 0.95   EGFR  --   --  116.6 78.8   GLUCOSE  --   --  232* 306*   CALCIUM  --   --  10.1 10.1   MAGNESIUM 1.7 1.5* 1.2* 1.1*         Lab 02/01/25  0446 01/30/25  0342   TOTAL PROTEIN 7.8 7.7   ALBUMIN 4.2 4.1   GLOBULIN 3.6 3.6   ALT (SGPT) 41* 35*   AST (SGOT) 55* 60*   BILIRUBIN 0.3 0.4   ALK PHOS 157* 136*     Brief Urine Lab Results  (Last result in the past 365 days)        Color   Clarity   Blood   Leuk Est   Nitrite   Protein   CREAT   Urine HCG        02/01/25 1711 Yellow   Clear   Negative   Negative   Negative   Trace                 Microbiology Results (last 10 days)       ** No results found for the last 240 hours. **          CT Abdomen Pelvis With Contrast    Result Date: 1/31/2025  CT ABDOMEN PELVIS W CONTRAST Date of Exam: 1/31/2025 6:02 PM EST Indication: rt flank pain. Comparison: CT abdomen pelvis without contrast 1/2/2025 Technique: Axial CT images were obtained of the abdomen and pelvis following the uneventful intravenous administration of 80 mL Isovue-300. Reconstructed coronal and sagittal images were also obtained. Automated exposure control and iterative construction methods were used. Findings: Lung bases are without consolidation. Cardiomegaly. No pericardial effusion or pleural effusion.  There is severe hepatomegaly with the liver measuring 30 cm in craniocaudal length. The spleen measures 18 cm in craniocaudal length. No discrete liver lesion. The gallbladder is absent. Adrenal glands are normal. The pancreas is without findings of pancreatitis. No common bile duct dilatation. The left kidney is absent. There is no abnormal soft tissue in the left nephrectomy bed. The right kidney enhances normally. Negative for hydroureteronephrosis. No right-sided perinephric inflammation or fluid collection. Bladder is without  acute abnormality. Uterus and adnexa unremarkable. Negative for pneumoperitoneum. No bowel obstruction. Moderate amount of stool in the colon. Normal appendix. The portal vein, splenic vein, and superior mesenteric vein are patent. Abdominal aortic branch vasculature patent. No aggressive osseous lesion or acute fracture. Delayed images demonstrate normal contrast excretion into the right renal collecting system with opacification of the right ureter to the level of the mid ureter. Distal ureter is incompletely opacified partially limiting complete assessment.     Impression: 1. No acute abnormality in abdomen or pelvis. 2. Severe hepatomegaly. 3. Splenomegaly. 4. Prior cholecystectomy and left nephrectomy. 5. Of note patient has over 90 abdominal CTs in our system raising concern for total cumulative radiation dose, which should be considered with future imaging orders. Electronically Signed: Moy Benavidez MD  1/31/2025 6:38 PM EST  Workstation ID: [id number]    [facility] FEES - Fiberoptic Endo Eval Swallow    Result Date: 1/31/2025  This procedure was auto-finalized with no dictation required.    CT Upper Extremity Right With Contrast    Result Date: 1/27/2025  CT UPPER EXTREMITY RIGHT W CONTRAST Date of Exam: 1/27/2025 11:48 AM EST Indication: pain in R shoulder and R upper arm. Comparison: Humerus radiographs 1/23/2025 Technique: Axial CT images were obtained of the right upper extremity after the uneventful intravenous administration of 95 mL Isovue-300.  Reconstructed coronal and sagittal images were also obtained. Automated exposure control and iterative construction methods were used. Findings: Bones: Normal acromioclavicular and glenohumeral alignment. No evidence of fracture. No osseous erosions. No substantial degenerative changes. Soft tissues: No soft tissue mass or fluid collection. Visualized muscles appear intact. Partially visualized nasogastric tube. Central venous catheter partially visualized. Visualized  lung is clear.     Impression: No evidence of acute osseous or soft tissue abnormality. Electronically Signed: Jacob Alvarado MD  1/27/2025 3:37 PM EST  Workstation ID: [id number]    [facility] FEES - Fiberoptic Endo Eval Swallow    Result Date: 1/27/2025  This procedure was auto-finalized with no dictation required.    Duplex Venous Upper Extremity - Right CAR    Result Date: 1/26/2025    Normal right upper extremity venous duplex scan.     XR Chest 1 View    Result Date: 1/26/2025  XR CHEST 1 VW Date of Exam: 1/26/2025 5:55 AM EST Indication: chest pain Comparison: Chest radiograph January 19, 2025 Findings: There is a feeding tube below the diaphragm. There is a left internal jugular central line with the tip in the superior vena cava. Heart is enlarged. The pulmonary vascular markings are normal. There is a persistent opacity at the left base likely combination of pleural effusion and atelectasis.     Impression: Persistent left basilar opacity likely combination of pleural fluid and atelectasis. Electronically Signed: Spencer James MD  1/26/2025 6:14 AM EST  Workstation ID: [id number]    [facility] FEES - Fiberoptic Endo Eval Swallow    Result Date: 1/23/2025  This procedure was auto-finalized with no dictation required.     Results for orders placed during the hospital encounter of 01/02/25    Duplex Venous Upper Extremity - Right CAR    Interpretation Summary    Normal right upper extremity venous duplex scan.    Results for orders placed during the hospital encounter of 01/02/25    Adult Transthoracic Echo Complete W/ Cont if Necessary Per Protocol    Interpretation Summary    Left ventricular systolic function is normal. Calculated left ventricular EF = 59.3% Left ventricular ejection fraction appears to be 61 - 65%.    Left ventricular wall thickness is consistent with borderline concentric hypertrophy.    Left ventricular diastolic function was normal.    Mild aortic valve stenosis is present.    Peak velocity of the  flow distal to the aortic valve is 292 cm/s. Aortic valve mean pressure gradient is 20 mmHg.    Estimated right ventricular systolic pressure from tricuspid regurgitation is normal (<35 mmHg).    Discharge Details        Discharge Medications        New Medications        Instructions Start Date   acetaminophen 325 MG tablet  Commonly known as: Tylenol   650 mg, Oral, Every 6 Hours PRN      HYDROmorphone 2 MG tablet  Commonly known as: DILAUDID   2 mg, Oral, Every 6 Hours PRN      hydrOXYzine 25 MG tablet  Commonly known as: ATARAX   25 mg, Oral, Nightly      lactobacillus acidophilus capsule capsule   1 capsule, Oral, Daily   Start Date: February 4, 2025     oxyCODONE 10 MG tablet  Commonly known as: ROXICODONE   10 mg, Oral, Every 4 Hours PRN             Changes to Medications        Instructions Start Date   nystatin 475385 UNIT/GM powder  Commonly known as: MYCOSTATIN  What changed: when to take this   Topical, 2 Times Daily      pantoprazole 40 MG EC tablet  Commonly known as: PROTONIX  What changed: when to take this   40 mg, Oral, Every Morning Before Breakfast             Continue These Medications        Instructions Start Date   Albuterol-Budesonide 90-80 MCG/ACT aerosol   2 puffs, Every 4 Hours PRN      allopurinol 100 MG tablet  Commonly known as: ZYLOPRIM   100 mg, Oral, Daily      amitriptyline 25 MG tablet  Commonly known as: ELAVIL   1 tablet, Every Night at Bedtime      amLODIPine 10 MG tablet  Commonly known as: NORVASC   10 mg, Daily      cholecalciferol 1.25 MG (23196 UT) capsule  Commonly known as: VITAMIN D3   50,000 Units, Every 7 Days      clopidogrel 75 MG tablet  Commonly known as: PLAVIX   75 mg, Oral, Daily      DULoxetine 30 MG capsule  Commonly known as: CYMBALTA   60 mg, Daily      DULoxetine 30 MG capsule  Commonly known as: CYMBALTA   30 mg, Nightly      Enoxaparin Sodium 100 MG/ML solution prefilled syringe syringe  Commonly known as: LOVENOX   100 mg, Subcutaneous, Every 12 Hours  Scheduled      fluticasone 50 MCG/ACT nasal spray  Commonly known as: FLONASE   2 sprays, Daily      gabapentin 300 MG capsule  Commonly known as: NEURONTIN   300 mg, Oral, 3 Times Daily      HumaLOG KwikPen 200 UNIT/ML solution pen-injector  Generic drug: Insulin Lispro   Subcutaneous, Inject 0-75 units per sliding scale subQ three times daily with meals      Insulin Regular Human (Conc) 500 UNIT/ML solution pen-injector CONCENTRATED injection  Commonly known as: HumuLIN R   Subcutaneous, 3 Times Daily Before Meals, Inject 200 units subQ with breakfast Inject 150 units subQ with lunch Inject 180 units subQ with dinner      ipratropium-albuterol 0.5-2.5 mg/3 ml nebulizer  Commonly known as: DUO-NEB   3 mL, Nebulization, Every 4 Hours PRN      lisinopril 10 MG tablet  Commonly known as: PRINIVIL,ZESTRIL   10 mg, Daily      metFORMIN 1000 MG tablet  Commonly known as: GLUCOPHAGE   1,000 mg, 2 Times Daily With Meals      metoprolol succinate XL 50 MG 24 hr tablet  Commonly known as: TOPROL-XL   50 mg, 2 Times Daily      mometasone-formoterol 200-5 MCG/ACT inhaler  Commonly known as: DULERA 200   2 puffs twice daily      montelukast 10 MG tablet  Commonly known as: SINGULAIR   10 mg, Oral, Nightly      multivitamin with minerals tablet tablet   1 tablet, Daily      rosuvastatin 20 MG tablet  Commonly known as: CRESTOR   40 mg, Oral, Nightly      vitamin B-12 1000 MCG tablet  Commonly known as: CYANOCOBALAMIN   1,000 mcg, Daily             Allergies   Allergen Reactions    Salvia Officinalis Itching, Other (See Comments) and Shortness Of Breath    Shellfish Allergy Anaphylaxis    Toradol [Ketorolac Tromethamine] Anaphylaxis and Hives    Tree Nut Anaphylaxis and Shortness Of Breath     WALNUTS    Bleach [Sodium Hypochlorite] Nausea Only     Per patient     Haldol [Haloperidol] Hives and Mental Status Change    Tramadol Hives and Swelling    Amoxicillin Hives and Rash     Tolerated ampicillin graded-dose challenge w/o  complications (1/4/25).    Penicillins Hives and Rash     Tolerated ampicillin graded-dose challenge w/o complications (1/4/25).     Discharge Disposition:Skilled Nursing Facility (DC - External)    Diet:  Diet Instructions       Diet: Regular/House Diet, Cardiac Diets, Diabetic Diets; Healthy Heart (2-3 Na+); Regular (IDDSI 7); Thin (IDDSI 0); Consistent Carbohydrate      Discharge Diet:  Regular/House Diet  Cardiac Diets  Diabetic Diets       Cardiac Diet: Healthy Heart (2-3 Na+)    Texture: Regular (IDDSI 7)    Fluid Consistency: Thin (IDDSI 0)    Diabetic Diet: Consistent Carbohydrate           Activity:  Activity Instructions       Activity as Tolerated      Up WIth Assist             CODE STATUS:    Code Status and Medical Interventions: CPR (Attempt to Resuscitate); Full Support   Ordered at: 01/02/25 1952     Code Status (Patient has no pulse and is not breathing):    CPR (Attempt to Resuscitate)     Medical Interventions (Patient has pulse or is breathing):    Full Support     Future Appointments   Date Time Provider Department Center   2/3/2025  2:00 PM MED 8 [facility]   2/13/2025 10:00 AM Milan King APRN [facility]   2/18/2025 10:00 AM [facility] NURSE LAB [facility]   2/18/2025 10:30 AM Alison Constantino MD [facility]     Soraya Irizarry PA-C  02/03/25    Time Spent on Discharge:  I spent 40 minutes on this discharge activity which included: face-to-face encounter with the patient, reviewing the data in the system, coordination of the care with the nursing staff as well as consultants, documentation, and entering orders.            Electronically signed by Soo Casas MD at 02/03/25 0933

## 2025-02-03 NOTE — CASE MANAGEMENT/SOCIAL WORK
Case Management Discharge Note      Final Note: Pt is discharging to acute rehab at [facility] today. She is on room air. CM confirmed with Lisa facility liaison, Pt has insurance approval and can be accepted today. Facility will retrieve discharge summary from [facility]. RN to call report to [phone]. If accepting RN is unavailable for report, RN may call report to house supervisor at [phone]. [facility]. PCS has been scanned into dropbox. Pt and fiance are aware and agreeable to plan. No other discharge needs identified.         Selected Continued Care - Admitted Since 1/2/2025       Destination Coordination complete.      Service Provider Services Address Phone Fax Patient Preferred    [facility] [address] [facility] [address] [phone] --              Durable Medical Equipment    No services have been selected for the patient.                Dialysis/Infusion    No services have been selected for the patient.                Home Medical Care    No services have been selected for the patient.                Therapy    No services have been selected for the patient.                Community Resources    No services have been selected for the patient.                Community & DME    No services have been selected for the patient.                    Transportation Services  Ambulance: [facility] (2/3 @ 1400)    Final Discharge Disposition Code: 62 - inpatient rehab facility

## 2025-02-14 DIAGNOSIS — I26.99 LEFT PULMONARY EMBOLUS: ICD-10-CM

## 2025-02-14 DIAGNOSIS — D68.51 FACTOR V LEIDEN: Primary | ICD-10-CM

## 2025-02-14 DIAGNOSIS — D64.9 NORMOCYTIC ANEMIA: ICD-10-CM

## 2025-02-14 DIAGNOSIS — Z86.718 HISTORY OF DVT (DEEP VEIN THROMBOSIS): ICD-10-CM

## 2025-02-18 DIAGNOSIS — D68.51 FACTOR V LEIDEN: Primary | ICD-10-CM

## 2025-02-18 DIAGNOSIS — D64.9 NORMOCYTIC ANEMIA: ICD-10-CM

## 2025-02-18 DIAGNOSIS — Z86.718 HISTORY OF DVT (DEEP VEIN THROMBOSIS): ICD-10-CM

## (undated) DEVICE — COR CYSTO: Brand: MEDLINE INDUSTRIES, INC.

## (undated) DEVICE — GOWN,REINF,POLY,ECL,PP SLV,XXL: Brand: MEDLINE

## (undated) DEVICE — GRSPR DELTAWIRE 3PRNG 3.2F 10MM 115CM

## (undated) DEVICE — GLV SURG PREMIERPRO MIC LTX PF SZ8 BRN

## (undated) DEVICE — URETERAL ACCESS SHEATH SET: Brand: NAVIGATOR

## (undated) DEVICE — GW ZIPWIRE STD/SHFT STR TPR/3CM .035IN 150CM

## (undated) DEVICE — CATH URETRL FLXITP POLLACK STD 5F 70CM